# Patient Record
Sex: FEMALE | Race: BLACK OR AFRICAN AMERICAN | NOT HISPANIC OR LATINO | Employment: OTHER | URBAN - METROPOLITAN AREA
[De-identification: names, ages, dates, MRNs, and addresses within clinical notes are randomized per-mention and may not be internally consistent; named-entity substitution may affect disease eponyms.]

---

## 2017-01-11 ENCOUNTER — OFFICE VISIT (OUTPATIENT)
Dept: INTERNAL MEDICINE | Facility: CLINIC | Age: 67
End: 2017-01-11
Payer: MEDICARE

## 2017-01-11 ENCOUNTER — HOSPITAL ENCOUNTER (OUTPATIENT)
Dept: RADIOLOGY | Facility: CLINIC | Age: 67
Discharge: HOME | End: 2017-01-11
Attending: INTERNAL MEDICINE
Payer: MEDICARE

## 2017-01-11 VITALS
RESPIRATION RATE: 16 BRPM | WEIGHT: 168 LBS | HEIGHT: 62 IN | BODY MASS INDEX: 30.91 KG/M2 | DIASTOLIC BLOOD PRESSURE: 89 MMHG | SYSTOLIC BLOOD PRESSURE: 136 MMHG | HEART RATE: 68 BPM

## 2017-01-11 DIAGNOSIS — E28.39 ESTROGEN DEFICIENCY: ICD-10-CM

## 2017-01-11 DIAGNOSIS — F32.0 MAJOR DEPRESSIVE DISORDER, SINGLE EPISODE, MILD: ICD-10-CM

## 2017-01-11 DIAGNOSIS — E78.5 HYPERLIPIDEMIA, UNSPECIFIED HYPERLIPIDEMIA TYPE: ICD-10-CM

## 2017-01-11 DIAGNOSIS — M85.80 OSTEOPENIA: ICD-10-CM

## 2017-01-11 DIAGNOSIS — D50.9 IRON DEFICIENCY ANEMIA, UNSPECIFIED IRON DEFICIENCY ANEMIA TYPE: ICD-10-CM

## 2017-01-11 DIAGNOSIS — I10 ESSENTIAL HYPERTENSION: Primary | Chronic | ICD-10-CM

## 2017-01-11 DIAGNOSIS — I69.30 HISTORY OF CVA WITH RESIDUAL DEFICIT: ICD-10-CM

## 2017-01-11 DIAGNOSIS — I70.0 AORTIC ATHEROSCLEROSIS: ICD-10-CM

## 2017-01-11 DIAGNOSIS — Z85.3 PERSONAL HISTORY OF MALIGNANT NEOPLASM OF BREAST: ICD-10-CM

## 2017-01-11 DIAGNOSIS — R25.1 TREMOR: ICD-10-CM

## 2017-01-11 DIAGNOSIS — R16.0 LIVER MASS: ICD-10-CM

## 2017-01-11 PROCEDURE — 99999 PR PBB SHADOW E&M-EST. PATIENT-LVL III: CPT | Mod: PBBFAC,,, | Performed by: INTERNAL MEDICINE

## 2017-01-11 PROCEDURE — 77080 DXA BONE DENSITY AXIAL: CPT | Mod: TC

## 2017-01-11 PROCEDURE — 3079F DIAST BP 80-89 MM HG: CPT | Mod: S$GLB,,, | Performed by: INTERNAL MEDICINE

## 2017-01-11 PROCEDURE — 1159F MED LIST DOCD IN RCRD: CPT | Mod: S$GLB,,, | Performed by: INTERNAL MEDICINE

## 2017-01-11 PROCEDURE — 1157F ADVNC CARE PLAN IN RCRD: CPT | Mod: S$GLB,,, | Performed by: INTERNAL MEDICINE

## 2017-01-11 PROCEDURE — 3075F SYST BP GE 130 - 139MM HG: CPT | Mod: S$GLB,,, | Performed by: INTERNAL MEDICINE

## 2017-01-11 PROCEDURE — 99499 UNLISTED E&M SERVICE: CPT | Mod: S$GLB,,, | Performed by: INTERNAL MEDICINE

## 2017-01-11 PROCEDURE — 77080 DXA BONE DENSITY AXIAL: CPT | Mod: 26,,, | Performed by: INTERNAL MEDICINE

## 2017-01-11 PROCEDURE — 1160F RVW MEDS BY RX/DR IN RCRD: CPT | Mod: S$GLB,,, | Performed by: INTERNAL MEDICINE

## 2017-01-11 PROCEDURE — 99214 OFFICE O/P EST MOD 30 MIN: CPT | Mod: S$GLB,,, | Performed by: INTERNAL MEDICINE

## 2017-01-11 RX ORDER — TRAMADOL HYDROCHLORIDE 50 MG/1
50 TABLET ORAL 2 TIMES DAILY PRN
Qty: 60 TABLET | Refills: 3 | Status: SHIPPED | OUTPATIENT
Start: 2017-01-11 | End: 2017-10-16 | Stop reason: SDUPTHER

## 2017-01-11 NOTE — MR AVS SNAPSHOT
[facility]  [address]  [place] [address]  Phone: [phone]  Fax: [phone]                  Shelby Thompson   2017 8:00 AM   Office Visit    Description:  Female : 1950   Provider:  Emanuel Arevalo Jr., MD   Department:  [facility] - Internal Medicine           Diagnoses this Visit        Comments    Essential hypertension    -  Primary     Personal history of malignant neoplasm of breast         Iron deficiency anemia, unspecified iron deficiency anemia type         Hyperlipidemia, unspecified hyperlipidemia type         Tremor         Aortic atherosclerosis         History of CVA with residual deficit         Osteopenia         Liver mass         Major depressive disorder, single episode, mild         Estrogen deficiency                To Do List           Goals (5 Years of Data)     None       These Medications        Disp Refills Start End    tramadol (ULTRAM) 50 mg tablet 60 tablet 3 2017     Take 1 tablet (50 mg total) by mouth 2 (two) times daily as needed for Pain. - Oral    Pharmacy: [facility]/pharmacy #[id number] - [place], [place] - [address]  #: [phone]         [facility] On Call     [facility] On Call Nurse Care Line -  Assistance  Registered nurses in the [facility] On Call Center provide clinical advisement, health education, appointment booking, and other advisory services.  Call for this free service at [phone].             Medications           Message regarding Medications     Verify the changes and/or additions to your medication regime listed below are the same as discussed with your clinician today.  If any of these changes or additions are incorrect, please notify your healthcare provider.        STOP taking these medications     amantadine HCl (SYMMETREL) 100 mg capsule Take 1 capsule (100 mg total) by mouth 2 (two) times daily.           Verify that the below list of medications is an accurate representation of the  "medications you are currently taking.  If none reported, the list may be blank. If incorrect, please contact your healthcare provider. Carry this list with you in case of emergency.           Current Medications     amlodipine (NORVASC) 5 MG tablet Take 1 tablet (5 mg total) by mouth once daily.    aspirin 81 mg Tab Take 1 tablet by mouth Daily.    bisacodyl (DULCOLAX) 5 mg EC tablet Take 5 mg by mouth once daily.    ferrous gluconate (FERGON) 325 MG Tab Take 1 tablet (325 mg total) by mouth 2 (two) times daily with meals.    gabapentin (NEURONTIN) 300 MG capsule Take 1 capsule (300 mg total) by mouth 2 (two) times daily.    losartan-hydrochlorothiazide 100-25 mg (HYZAAR) 100-25 mg per tablet Take 1 tablet by mouth once daily.    omeprazole (PRILOSEC) 40 MG capsule Take 1 capsule (40 mg total) by mouth once daily.    sertraline (ZOLOFT) 50 MG tablet Take 1 tablet (50 mg total) by mouth once daily.    tramadol (ULTRAM) 50 mg tablet Take 1 tablet (50 mg total) by mouth 2 (two) times daily as needed for Pain.    vitamin D 1000 units Tab Take 185 mg by mouth once daily.    polyethylene glycol (COLYTE) 240-22.72-6.72 -5.84 gram SolR Take 8 oz q 15 minutes until complete (2,000 ml)  the night before procedure           Clinical Reference Information           Vital Signs - Last Recorded  Most recent update: 1/11/2017  8:14 AM by Piedad Cifuentes MA    BP Pulse Resp Ht Wt BMI    136/89 (BP Location: Left arm, Patient Position: Sitting) 68 16 5' 2" (1.575 m) 76.2 kg (167 lb 15.9 oz) 30.73 kg/m2      Blood Pressure          Most Recent Value    BP  136/89      Allergies as of 1/11/2017     Pravastatin      Immunizations Administered on Date of Encounter - 1/11/2017     None      Orders Placed During Today's Visit     Future Labs/Procedures Expected by Expires    CBC auto differential  1/11/2017 3/12/2018    Comprehensive metabolic panel  1/11/2017 3/12/2018    DXA Bone Density Spine And Hip_Axial Skeleton  1/11/2017 " 4/11/2017    Iron and TIBC  1/11/2017 4/11/2017    Lipid panel  1/11/2017 3/12/2018

## 2017-01-11 NOTE — PROGRESS NOTES
She is a 66-year-old female coming in today to follow up her ongoing medical   Problems.      1. She has a history of CVA affecting her right side in 2001. Has a mild   residual right-sided hemiparesis.       2. Hyperlipidemia.  She is intolerant to statins because of   rhabdo, liver enzymes have resolved. Her recent cholesterol unfortunately is   still elevated at 243 with HDL 37, . She is working on low-fat,   low-cholesterol diet.       3.  She has history of anemia, recent H and H has come up to   12.4/ 39.0   and iron studies show TIBC 474. Iron sat is 7 she is taking iron   replacement. She had a colonoscopy in 2014 for colon polyps, EGD in 2012, and video capsule in Sept 2016: Gastritis.                        - Multiple angioectasias without bleeding in the                         proximal small bowel.                        - A single angioectasia without bleeding in the                         ileum.  Gi recommended following up with H/H and continuing iron.      4. She has a tremor on her right   side that she was seen by Neurology back in 2012 for this . I reviewed those notes   and has been doing okay, but when she walks for a long time, she has to use a   Walker.  She does not think the amantidine is helping and we discuss stopping it.       5. She has a history of breast cancer, following up with regular   mammograms. Most recent mammogram was in September. She had lumpectomy in 2011.      6. She had a liver mass that we recently got MRI followup, which showed a   benign appearing 1 cm lesion in the VI area of the liver, which seems to be   Hemangioma.     7.  She continues to have some pain in her right arm secondary to her   stroke post-residual from a stroke. She is taking tramadol for that is working.      8. She has a history of some depression and anxiety for which she is on Zoloft.   She says this has been doing well too. Otherwise, she denies any problems.     PHYSICAL  EXAMINATION:  GENERAL: She is a well-appearing -American female in no acute distress.    is here with this visit, blood pressure and pulse reviewed, weight is   reviewed.  NECK: Supple. She has no JVD. Thyroid is not enlarged.  CARDIOVASCULAR: S1 and S2, regular rate and rhythm without murmur, gallop or   rub.  ABDOMEN: Soft, nontender, no hepatosplenomegaly, no guarding or rebound   tenderness.  LOWER EXTREMITIES: No edema. She has a tremor of her right arm, less in her   right leg. She has normal muscle strength in both, but her dexterity in the   right arm is less.     ASSESSMENT: Hypertension, iron deficiency anemia, hyperlipidemia, cannot   tolerate statins due to rhabdo, history of cerebral infarction, tremor now,   history of elevated liver enzymes that resolved, history of CVA and the liver   mass, which we reviewed already. Continue current medications- except will stop the amantadine and if tremor worsens we can restart it.   I will follow   up again in six months. Continue iron. If she has any problems before next   visit, she will let me know.     Will get BMD

## 2017-01-25 DIAGNOSIS — F32.A DEPRESSION: ICD-10-CM

## 2017-01-26 RX ORDER — SERTRALINE HYDROCHLORIDE 50 MG/1
TABLET, FILM COATED ORAL
Qty: 90 TABLET | Refills: 9 | Status: SHIPPED | OUTPATIENT
Start: 2017-01-26 | End: 2018-04-19 | Stop reason: SDUPTHER

## 2017-02-13 ENCOUNTER — OFFICE VISIT (OUTPATIENT)
Dept: OPTOMETRY | Facility: CLINIC | Age: 67
End: 2017-02-13
Payer: MEDICARE

## 2017-02-13 ENCOUNTER — OFFICE VISIT (OUTPATIENT)
Dept: INTERNAL MEDICINE | Facility: CLINIC | Age: 67
End: 2017-02-13
Payer: MEDICARE

## 2017-02-13 VITALS
BODY MASS INDEX: 31.48 KG/M2 | HEIGHT: 62 IN | DIASTOLIC BLOOD PRESSURE: 80 MMHG | SYSTOLIC BLOOD PRESSURE: 120 MMHG | WEIGHT: 171.06 LBS | HEART RATE: 72 BPM

## 2017-02-13 DIAGNOSIS — I10 ESSENTIAL HYPERTENSION: ICD-10-CM

## 2017-02-13 DIAGNOSIS — I71.20 THORACIC AORTIC ANEURYSM WITHOUT RUPTURE: ICD-10-CM

## 2017-02-13 DIAGNOSIS — Z85.3 PERSONAL HISTORY OF MALIGNANT NEOPLASM OF BREAST: ICD-10-CM

## 2017-02-13 DIAGNOSIS — R25.1 TREMOR: ICD-10-CM

## 2017-02-13 DIAGNOSIS — H52.03 HYPEROPIA WITH PRESBYOPIA OF BOTH EYES: ICD-10-CM

## 2017-02-13 DIAGNOSIS — Z00.00 ENCOUNTER FOR PREVENTIVE HEALTH EXAMINATION: ICD-10-CM

## 2017-02-13 DIAGNOSIS — M85.80 OSTEOPENIA: ICD-10-CM

## 2017-02-13 DIAGNOSIS — I69.359 HEMIPARESIS AFFECTING DOMINANT SIDE AS LATE EFFECT OF STROKE: ICD-10-CM

## 2017-02-13 DIAGNOSIS — I70.0 AORTIC ATHEROSCLEROSIS: ICD-10-CM

## 2017-02-13 DIAGNOSIS — H40.013 OPEN ANGLE WITH BORDERLINE FINDINGS AND LOW GLAUCOMA RISK IN BOTH EYES: ICD-10-CM

## 2017-02-13 DIAGNOSIS — G56.90: ICD-10-CM

## 2017-02-13 DIAGNOSIS — H25.13 NUCLEAR SCLEROSIS, BILATERAL: Primary | ICD-10-CM

## 2017-02-13 DIAGNOSIS — Z13.5 GLAUCOMA SCREENING: ICD-10-CM

## 2017-02-13 DIAGNOSIS — E78.5 HYPERLIPIDEMIA, UNSPECIFIED HYPERLIPIDEMIA TYPE: ICD-10-CM

## 2017-02-13 DIAGNOSIS — F33.42 RECURRENT MAJOR DEPRESSIVE DISORDER, IN FULL REMISSION: ICD-10-CM

## 2017-02-13 DIAGNOSIS — I51.89 LEFT VENTRICULAR DIASTOLIC DYSFUNCTION WITH PRESERVED SYSTOLIC FUNCTION: ICD-10-CM

## 2017-02-13 DIAGNOSIS — I73.9 PAD (PERIPHERAL ARTERY DISEASE): ICD-10-CM

## 2017-02-13 DIAGNOSIS — H52.4 HYPEROPIA WITH PRESBYOPIA OF BOTH EYES: ICD-10-CM

## 2017-02-13 DIAGNOSIS — I69.30 HISTORY OF CVA WITH RESIDUAL DEFICIT: ICD-10-CM

## 2017-02-13 DIAGNOSIS — D50.9 IRON DEFICIENCY ANEMIA, UNSPECIFIED IRON DEFICIENCY ANEMIA TYPE: ICD-10-CM

## 2017-02-13 PROCEDURE — 92133 CPTRZD OPH DX IMG PST SGM ON: CPT | Mod: S$GLB,,, | Performed by: OPTOMETRIST

## 2017-02-13 PROCEDURE — 3074F SYST BP LT 130 MM HG: CPT | Mod: S$GLB,,, | Performed by: NURSE PRACTITIONER

## 2017-02-13 PROCEDURE — 92015 DETERMINE REFRACTIVE STATE: CPT | Mod: S$GLB,,, | Performed by: OPTOMETRIST

## 2017-02-13 PROCEDURE — 92014 COMPRE OPH EXAM EST PT 1/>: CPT | Mod: S$GLB,,, | Performed by: OPTOMETRIST

## 2017-02-13 PROCEDURE — G0439 PPPS, SUBSEQ VISIT: HCPCS | Mod: S$GLB,,, | Performed by: NURSE PRACTITIONER

## 2017-02-13 PROCEDURE — 99999 PR PBB SHADOW E&M-EST. PATIENT-LVL III: CPT | Mod: PBBFAC,,, | Performed by: OPTOMETRIST

## 2017-02-13 PROCEDURE — 3078F DIAST BP <80 MM HG: CPT | Mod: S$GLB,,, | Performed by: OPTOMETRIST

## 2017-02-13 PROCEDURE — 3074F SYST BP LT 130 MM HG: CPT | Mod: S$GLB,,, | Performed by: OPTOMETRIST

## 2017-02-13 PROCEDURE — 99999 PR PBB SHADOW E&M-EST. PATIENT-LVL IV: CPT | Mod: PBBFAC,,, | Performed by: NURSE PRACTITIONER

## 2017-02-13 PROCEDURE — 99499 UNLISTED E&M SERVICE: CPT | Mod: S$GLB,,, | Performed by: NURSE PRACTITIONER

## 2017-02-13 PROCEDURE — 3079F DIAST BP 80-89 MM HG: CPT | Mod: S$GLB,,, | Performed by: NURSE PRACTITIONER

## 2017-02-13 PROCEDURE — 99499 UNLISTED E&M SERVICE: CPT | Mod: S$GLB,,, | Performed by: OPTOMETRIST

## 2017-02-13 RX ORDER — FERROUS GLUCONATE 324(37.5)
TABLET ORAL
COMMUNITY
Start: 2017-01-19 | End: 2017-03-14 | Stop reason: SDUPTHER

## 2017-02-13 NOTE — PROGRESS NOTES
"Shelby Thompson presented for a  Medicare AWV and comprehensive Health Risk Assessment today. The following components were reviewed and updated:    · Medical history  · Family History  · Social history  · Allergies and Current Medications  · Health Risk Assessment  · Health Maintenance  · Care Team     ** See Completed Assessments for Annual Wellness Visit within the encounter summary.**       The following assessments were completed:  · Living Situation  · CAGE  · Depression Screening  · Timed Get Up and Go  · Whisper Test  · Cognitive Function Screening  · Nutrition Screening  · ADL Screening  · PAQ Screening    Vitals:    02/13/17 0809 02/13/17 0827   BP:  120/80   BP Location:  Left arm   Pulse:  72   Weight: 77.6 kg (171 lb 1.2 oz)    Height: 5' 2" (1.575 m)      Body mass index is 31.29 kg/(m^2).  Physical Exam   Constitutional: She is oriented to person, place, and time. She appears well-developed and well-nourished.   HENT:   Head: Normocephalic.   Cardiovascular: Normal rate, regular rhythm and normal heart sounds.    No murmur heard.  Pulmonary/Chest: Effort normal and breath sounds normal. She has no wheezes. She has no rales.   Abdominal: Soft. Bowel sounds are normal.   Musculoskeletal: Normal range of motion. She exhibits no edema.   Neurological: She is alert and oriented to person, place, and time. She exhibits normal muscle tone.   Mild dysarhtira, Mild right hemiparesis, tremor   Skin: Skin is warm and dry.   Psychiatric: She has a normal mood and affect.   Nursing note and vitals reviewed.        Diagnoses and health risks identified today and associated recommendations/orders:    1. Encounter for preventive health examination  Here for Health Risk Assessment. Follow up in one year.  Unable to complete Mini Cog/Clock drawing secondary to inability to write.    2. Glaucoma screening  - Ambulatory Referral to Optometry    3. Essential hypertension  Chronic, stable on current medications. Followed " by PCP.    4. Aortic atherosclerosis  Chronic, stable on current medications. Noted on  CT of chest 4/23/15. Followed by PCP.    5. Left ventricular diastolic dysfunction with preserved systolic function  Chronic, stable on current medications. Noted on ECHO 3/25/25. Followed by PCP.    6. Thoracic aortic aneurysm without rupture  Chronic, stable. Followed by Vascular Surgery    7. History of CVA with residual deficit  Chronic, stable on current medications. Followed by PCP.    8. Hemiparesis affecting dominant side as late effect of stroke  Chronic, stable on current medications. Followed by PCP.    9. 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 Followed by PCP.    10. PAD (peripheral artery disease)  Chronic, stable on current medications. Noted on CAILIN 5/07/15. Followed by PCP.    11. Mononeuritis of upper limb, unspecified  Chronic, stable on current medications. Followed by PCP.    12. Iron deficiency anemia, unspecified iron deficiency anemia type  Chronic, stable on current medications. Followed by PCP.    13. Hyperlipidemia, unspecified hyperlipidemia type  Chronic, stable wit diet, unable to tolerate statins. Followed by PCP.    14. Personal history of malignant neoplasm of breast  Stable. Followed by Oncology[facility]    15. Tremor  Chronic, stable.  Followed by PCP.    16. Osteopenia  Chronic, stable. Followed by PCP.    17. Recurrent major depressive disorder, in full remission  Chronic, stable on current medication. PHQ-9 score 0. Followed by PCP.    18. 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  Chronic. Followed by Optometry.      Provided Shelby with a 5-10 year written screening schedule and personal prevention plan. Recommendations were developed using the USPSTF age appropriate recommendations. Education, counseling, and referrals were provided as needed. After Visit Summary printed and given to patient which includes a list of  additional screenings\tests needed.    Return in about 11 months (around 1/11/2018).with PCP.    Kat Antonio NP

## 2017-02-13 NOTE — PROGRESS NOTES
HPI     Last eye exam was 8/4/14 with Dr. Viera.  Patient states decrease in overall vision since last exam-thinks cataracts   have gotten worse. Also overdue for health check for glaucoma per PCP. Has   glasses but only wears them for reading.  Patient denies diplopia, headaches, flashes/floaters, and pain.         Last edited by Katherine Guo on 2/13/2017  1:10 PM.     ROS     Negative for: Constitutional, Gastrointestinal, Neurological, Skin,   Genitourinary, Musculoskeletal, HENT, Endocrine, Cardiovascular, Eyes,   Respiratory, Psychiatric, Allergic/Imm, Heme/Lymph    Last edited by Mariel Simental, OD on 2/13/2017  1:41 PM. (History)        Assessment /Plan     For exam results, see Encounter Report.    Nuclear sclerosis, bilateral    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  -     OCT - Optic Nerve    Essential hypertension    Hyperopia with presbyopia of both eyes            1.  Educated on cataracts and affects on vision.  Monitor.  2.  Due to increased c/d ratio.  OCT and eye pressure normal OU.  No family history of glaucoma.  Most likely physiological.  Monitor yearly.  3.  No retinopathy--monitor yearly.  BP control.  4.  Bifocal rx given            RTC 1 year for routine exam.

## 2017-02-13 NOTE — PATIENT INSTRUCTIONS
Counseling and Referral of Other Preventative  (Italic type indicates deductible and co-insurance are waived)    Patient Name: Shelby Thompson  Today's Date: 2/13/2017      SERVICE LIMITATIONS RECOMMENDATION    Vaccines    · Pneumococcal (once after 65)    · Influenza (annually)    · Hepatitis B (if medium/high risk)    · Prevnar 13      Hepatitis B medium/high risk factors:       - End-stage renal disease       - Hemophiliacs who received Factor VII or         IX concentrates       - Clients of institutions for the mentally             retarded       - Persons who live in the same house as          a HepB carrier       - Homosexual men       - Illicit injectable drug abusers     Pneumococcal: Done, no repeat necessary     Influenza: Done, repeat in one year     Hepatitis B: N/A Defer to PCP     Prevnar 13: N/A Done - no repeat necessary    Mammogram (biennial age 50-74)  Annually (age 40 or over)  Last done 9/21/16, recommend to repeat every 1  years    Pap (up to age 70 and after 70 if unknown history or abnormal study last 10 years)    N/A Not indicated     The USPSTF recommends against screening for cervical cancer in women older than age 65 years who have had adequate prior screening and are not otherwise at high risk for cervical cancer.      Colorectal cancer screening (to age 75)    · Fecal occult blood test (annual)  · Flexible sigmoidoscopy (5y)  · Screening colonoscopy (10y)  · Barium enema   Last done 3/26/15, recommend to repeat every 10  years    Diabetes self-management training (no USPSTF recommendations)  Requires referral by treating physician for patient with diabetes or renal disease. 10 hours of initial DSMT sessions of no less than 30 minutes each in a continuous 12-month period. 2 hours of follow-up DSMT in subsequent years.  N/A Not indicated    Bone mass measurements (age 65 & older, biennial)  Requires diagnosis related to osteoporosis or estrogen deficiency. Biennial benefit unless  patient has history of long-term glucocorticoid  Last done 1/11/17, recommend to repeat every 2  years    Glaucoma screening (no USPSTF recommendation)  Diabetes mellitus, family history   , age 50 or over    American, age 65 or over  Scheduled, see appointments    Medical nutrition therapy for diabetes or renal disease (no recommended schedule)  Requires referral by treating physician for patient with diabetes or renal disease or kidney transplant within the past 3 years.  Can be provided in same year as diabetes self-management training (DSMT), and CMS recommends medical nutrition therapy take place after DSMT. Up to 3 hours for initial year and 2 hours in subsequent years.  N/A Not indicated    Cardiovascular screening blood tests (every 5 years)  · Fasting lipid panel  Order as a panel if possible  Last done 4/07/16, recommend to repeat every 5  years    Diabetes screening tests (at least every 3 years, Medicare covers annually or at 6-month intervals for prediabetic patients)  · Fasting blood sugar (FBS) or glucose tolerance test (GTT)  Patient must be diagnosed with one of the following:       - Hypertension       - Dyslipidemia       - Obesity (BMI 30kg/m2)       - Previous elevated impaired FBS or GTT       ... or any two of the following:       - Overweight (BMI 25 but <30)       - Family history of diabetes       - Age 65 or older       - History of gestational diabetes or birth of baby weighing more than 9 pounds  Last done 5/18/16, recommend to repeat every 3  years    Abdominal aortic aneurysm screening (once)  · Sonogram   Limited to patients who meet one of the following criteria:       - Men who are 65-75 years old and have smoked more than 100 cigarette in their lifetime       - Anyone with a family history of abdominal aortic aneurysm       - Anyone recommended for screening by the USPSTF  N/A Not indicated    HIV screening (annually for increased risk patients)  · HIV-1 and  HIV-2 by EIA, or VERONICA, rapid antibody test or oral mucosa transudate  Patients must be at increased risk for HIV infection per USPSTF guidelines or pregnant. Tests covered annually for patient at increased risk or as requested by the patient. Pregnant patients may receive up to 3 tests during pregnancy.  Risks discussed, screening is not recommended    Smoking cessation counseling (up to 8 sessions per year)  Patients must be asymptomatic of tobacco-related conditions to receive as a preventative service.  Not indicated    Subsequent annual wellness visit  At least 12 months since last AWV  Return in one year     The following information is provided to all patients.  This information is to help you find resources for any of the problems found today that may be affecting your health:                Living healthy guide: www.[facility].[place].[facility]      Understanding Diabetes: www.diabetes.org      Eating healthy: www.cdc.gov/healthyweight      CDC home safety checklist: www.cdc.gov/steadi/patient.html      Agency on Aging: www.goea.[place].[facility]      Alcoholics anonymous (AA): www.aa.org      Physical Activity: www.joseline.nih.gov/zi5gqdx      Tobacco use: www.quitwithusla.org

## 2017-02-13 NOTE — MR AVS SNAPSHOT
[facility] - Internal Medicine  [address]  [facility] [address]  Phone: [phone]  Fax: [phone]                  Shelby Thompson   2017 8:00 AM   Office Visit    Description:  Female : 1950   Provider:  Kat Antonio NP   Department:  [facility] - Internal Medicine           Diagnoses this Visit        Comments    Encounter for preventive health examination         Glaucoma screening         Essential hypertension         Aortic atherosclerosis         Left ventricular diastolic dysfunction with preserved systolic function         Thoracic aortic aneurysm without rupture         History of CVA with residual deficit         Hemiparesis affecting dominant side as late effect of stroke         Dysarthria due to cerebrovascular accident         PAD (peripheral artery disease)         Mononeuritis of upper limb, unspecified         Iron deficiency anemia, unspecified iron deficiency anemia type         Hyperlipidemia, unspecified hyperlipidemia type         Personal history of malignant neoplasm of breast         Tremor         Osteopenia         Recurrent major depressive disorder, in full remission         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                To Do List           Goals (5 Years of Data)     None      Follow-Up and Disposition     Return in about 11 months (around 2018).    Follow-up and Disposition History      [facility] On Call     [facility] On Call Nurse Care Line -  Assistance  Registered nurses in the [facility] On Call Center provide clinical advisement, health education, appointment booking, and other advisory services.  Call for this free service at [phone].             Medications           Message regarding Medications     Verify the changes and/or additions to your medication regime listed below are the same as discussed with your clinician today.  If any of these changes or additions are incorrect, please  "notify your healthcare provider.        STOP taking these medications     polyethylene glycol (COLYTE) 240-22.72-6.72 -5.84 gram SolR Take 8 oz q 15 minutes until complete (2,000 ml)  the night before procedure           Verify that the below list of medications is an accurate representation of the medications you are currently taking.  If none reported, the list may be blank. If incorrect, please contact your healthcare provider. Carry this list with you in case of emergency.           Current Medications     amlodipine (NORVASC) 5 MG tablet Take 1 tablet (5 mg total) by mouth once daily.    aspirin 81 mg Tab Take 1 tablet by mouth Daily.    bisacodyl (DULCOLAX) 5 mg EC tablet Take 5 mg by mouth once daily.    ferrous gluconate (FERGON) 325 MG Tab Take 1 tablet (325 mg total) by mouth 2 (two) times daily with meals.    gabapentin (NEURONTIN) 300 MG capsule Take 1 capsule (300 mg total) by mouth 2 (two) times daily.    losartan-hydrochlorothiazide 100-25 mg (HYZAAR) 100-25 mg per tablet Take 1 tablet by mouth once daily.    omeprazole (PRILOSEC) 40 MG capsule Take 1 capsule (40 mg total) by mouth once daily.    sertraline (ZOLOFT) 50 MG tablet TAKE 1 TABLET (50 MG TOTAL) BY MOUTH ONCE DAILY.    tramadol (ULTRAM) 50 mg tablet Take 1 tablet (50 mg total) by mouth 2 (two) times daily as needed for Pain.    vitamin D 1000 units Tab Take 185 mg by mouth once daily.    ferrous gluconate 324 mg (37.5 mg iron) Tab            Clinical Reference Information           Your Vitals Were     BP Pulse Height Weight BMI    120/80 (BP Location: Left arm) 72 5' 2" (1.575 m) 77.6 kg (171 lb 1.2 oz) 31.29 kg/m2      Blood Pressure          Most Recent Value    BP  120/80      Allergies as of 2/13/2017     Pravastatin      Immunizations Administered on Date of Encounter - 2/13/2017     None      Orders Placed During Today's Visit      Normal Orders This Visit    Ambulatory Referral to Optometry       Instructions      Counseling and " Referral of Other Preventative  (Italic type indicates deductible and co-insurance are waived)    Patient Name: Shelby Thompson  Today's Date: 2/13/2017      SERVICE LIMITATIONS RECOMMENDATION    Vaccines    · Pneumococcal (once after 65)    · Influenza (annually)    · Hepatitis B (if medium/high risk)    · Prevnar 13      Hepatitis B medium/high risk factors:       - End-stage renal disease       - Hemophiliacs who received Factor VII or         IX concentrates       - Clients of institutions for the mentally             retarded       - Persons who live in the same house as          a HepB carrier       - Homosexual men       - Illicit injectable drug abusers     Pneumococcal: Done, no repeat necessary     Influenza: Done, repeat in one year     Hepatitis B: N/A Defer to PCP     Prevnar 13: N/A Done - no repeat necessary    Mammogram (biennial age 50-74)  Annually (age 40 or over)  Last done 9/21/16, recommend to repeat every 1  years    Pap (up to age 70 and after 70 if unknown history or abnormal study last 10 years)    N/A Not indicated     The USPSTF recommends against screening for cervical cancer in women older than age 65 years who have had adequate prior screening and are not otherwise at high risk for cervical cancer.      Colorectal cancer screening (to age 75)    · Fecal occult blood test (annual)  · Flexible sigmoidoscopy (5y)  · Screening colonoscopy (10y)  · Barium enema   Last done 3/26/15, recommend to repeat every 10  years    Diabetes self-management training (no USPSTF recommendations)  Requires referral by treating physician for patient with diabetes or renal disease. 10 hours of initial DSMT sessions of no less than 30 minutes each in a continuous 12-month period. 2 hours of follow-up DSMT in subsequent years.  N/A Not indicated    Bone mass measurements (age 65 & older, biennial)  Requires diagnosis related to osteoporosis or estrogen deficiency. Biennial benefit unless patient has history of  long-term glucocorticoid  Last done 1/11/17, recommend to repeat every 2  years    Glaucoma screening (no USPSTF recommendation)  Diabetes mellitus, family history   , age 50 or over    American, age 65 or over  Scheduled, see appointments    Medical nutrition therapy for diabetes or renal disease (no recommended schedule)  Requires referral by treating physician for patient with diabetes or renal disease or kidney transplant within the past 3 years.  Can be provided in same year as diabetes self-management training (DSMT), and CMS recommends medical nutrition therapy take place after DSMT. Up to 3 hours for initial year and 2 hours in subsequent years.  N/A Not indicated    Cardiovascular screening blood tests (every 5 years)  · Fasting lipid panel  Order as a panel if possible  Last done 4/07/16, recommend to repeat every 5  years    Diabetes screening tests (at least every 3 years, Medicare covers annually or at 6-month intervals for prediabetic patients)  · Fasting blood sugar (FBS) or glucose tolerance test (GTT)  Patient must be diagnosed with one of the following:       - Hypertension       - Dyslipidemia       - Obesity (BMI 30kg/m2)       - Previous elevated impaired FBS or GTT       ... or any two of the following:       - Overweight (BMI 25 but <30)       - Family history of diabetes       - Age 65 or older       - History of gestational diabetes or birth of baby weighing more than 9 pounds  Last done 5/18/16, recommend to repeat every 3  years    Abdominal aortic aneurysm screening (once)  · Sonogram   Limited to patients who meet one of the following criteria:       - Men who are 65-75 years old and have smoked more than 100 cigarette in their lifetime       - Anyone with a family history of abdominal aortic aneurysm       - Anyone recommended for screening by the USPSTF  N/A Not indicated    HIV screening (annually for increased risk patients)  · HIV-1 and HIV-2 by EIA, or  VERONICA, rapid antibody test or oral mucosa transudate  Patients must be at increased risk for HIV infection per USPSTF guidelines or pregnant. Tests covered annually for patient at increased risk or as requested by the patient. Pregnant patients may receive up to 3 tests during pregnancy.  Risks discussed, screening is not recommended    Smoking cessation counseling (up to 8 sessions per year)  Patients must be asymptomatic of tobacco-related conditions to receive as a preventative service.  Not indicated    Subsequent annual wellness visit  At least 12 months since last AWV  Return in one year     The following information is provided to all patients.  This information is to help you find resources for any of the problems found today that may be affecting your health:                Living healthy guide: www.[facility].[place].[facility]      Understanding Diabetes: www.diabetes.org      Eating healthy: www.cdc.gov/healthyweight      [facility] home safety checklist: www.cdc.gov/steadi/patient.html      Agency on Aging: www.goea.[place].[facility]      Alcoholics anonymous (AA): www.aa.org      Physical Activity: www.joseline.nih.gov/ut9rnpj      Tobacco use: www.quitwithusla.org          Language Assistance Services     ATTENTION: Language assistance services are available, free of charge. Please call [phone].      ATENCIÓN: Si habla español, tiene a guerin disposición servicios gratuitos de asistencia lingüística. Llame al [phone].     ENOCH Ý: N?u b?n nói Ti?ng Vi?t, có các d?ch v? h? tr? ngôn ng? mi?n phí dành cho b?n. G?i s? [phone].         [facility] complies with applicable Federal civil rights laws and does not discriminate on the basis of race, color, national origin, age, disability, or sex.

## 2017-02-13 NOTE — LETTER
February 13, 2017      Kat Antonio, NP  [address]  [facility] [address]           [facility]  [address]  [facility] [address]  Phone: [phone]  Fax: [phone]          Patient: Shelby Thompson   MR Number: [id number]   YOB: 1950   Date of Visit: 2/13/2017       Dear Kta Antonio:    Thank you for referring Shelby Thompson to me for evaluation. Attached you will find relevant portions of my assessment and plan of care.    If you have questions, please do not hesitate to call me. I look forward to following Shelby Thompson along with you.    Sincerely,    Mariel Simental, OD    Enclosure  CC:  No Recipients    If you would like to receive this communication electronically, please contact [email or web address] or [phone] to request more information on [employer] Link access.    For providers and/or their staff who would like to refer a patient to [facility], please contact us through our one-stop-shop provider referral line, [facility], [place], at [phone].    If you feel you have received this communication in error or would no longer like to receive these types of communications, please e-mail [email or web address]

## 2017-03-15 RX ORDER — FERROUS GLUCONATE 324(37.5)
324 TABLET ORAL 2 TIMES DAILY WITH MEALS
Qty: 60 TABLET | Refills: 3 | COMMUNITY
Start: 2017-03-15 | End: 2017-09-15

## 2017-07-17 ENCOUNTER — OFFICE VISIT (OUTPATIENT)
Dept: INTERNAL MEDICINE | Facility: CLINIC | Age: 67
End: 2017-07-17
Payer: MEDICARE

## 2017-07-17 VITALS
HEIGHT: 63 IN | SYSTOLIC BLOOD PRESSURE: 112 MMHG | DIASTOLIC BLOOD PRESSURE: 81 MMHG | HEART RATE: 62 BPM | WEIGHT: 168.44 LBS | BODY MASS INDEX: 29.84 KG/M2 | OXYGEN SATURATION: 96 %

## 2017-07-17 DIAGNOSIS — I10 ESSENTIAL HYPERTENSION: ICD-10-CM

## 2017-07-17 DIAGNOSIS — I73.9 PAD (PERIPHERAL ARTERY DISEASE): ICD-10-CM

## 2017-07-17 DIAGNOSIS — Z85.3 PERSONAL HISTORY OF MALIGNANT NEOPLASM OF BREAST: ICD-10-CM

## 2017-07-17 DIAGNOSIS — F33.42 RECURRENT MAJOR DEPRESSIVE DISORDER, IN FULL REMISSION: ICD-10-CM

## 2017-07-17 DIAGNOSIS — I69.30 HISTORY OF CVA WITH RESIDUAL DEFICIT: ICD-10-CM

## 2017-07-17 DIAGNOSIS — R25.1 TREMOR: Primary | ICD-10-CM

## 2017-07-17 DIAGNOSIS — E78.5 HYPERLIPIDEMIA, UNSPECIFIED HYPERLIPIDEMIA TYPE: ICD-10-CM

## 2017-07-17 DIAGNOSIS — R16.0 LIVER MASS: ICD-10-CM

## 2017-07-17 DIAGNOSIS — D50.9 IRON DEFICIENCY ANEMIA, UNSPECIFIED IRON DEFICIENCY ANEMIA TYPE: ICD-10-CM

## 2017-07-17 DIAGNOSIS — Z78.9 STATIN INTOLERANCE: ICD-10-CM

## 2017-07-17 PROCEDURE — 1126F AMNT PAIN NOTED NONE PRSNT: CPT | Mod: S$GLB,,, | Performed by: INTERNAL MEDICINE

## 2017-07-17 PROCEDURE — 99499 UNLISTED E&M SERVICE: CPT | Mod: S$GLB,,, | Performed by: INTERNAL MEDICINE

## 2017-07-17 PROCEDURE — 99999 PR PBB SHADOW E&M-EST. PATIENT-LVL IV: CPT | Mod: PBBFAC,,, | Performed by: INTERNAL MEDICINE

## 2017-07-17 PROCEDURE — 99214 OFFICE O/P EST MOD 30 MIN: CPT | Mod: S$GLB,,, | Performed by: INTERNAL MEDICINE

## 2017-07-17 PROCEDURE — 1159F MED LIST DOCD IN RCRD: CPT | Mod: S$GLB,,, | Performed by: INTERNAL MEDICINE

## 2017-07-17 NOTE — PROGRESS NOTES
She is a 67-year-old female coming in today to follow up her ongoing medical   Problems.        1. She has a history of CVA affecting her right side in 2001. Has a mild   residual right-sided hemiparesis.         2. Hyperlipidemia.  She is intolerant to statins because of   rhabdo, liver enzymes have resolved. Her most recent cholesterol unfortunately is   still elevated at 243 with HDL 37, . She is working on low-fat,   low-cholesterol diet.         3.  She has history of anemia, recent H and H has come up to   12.4/ 39.0 (9/2016)  and iron studies show TIBC 474. Iron sat is 7 she is taking iron   replacement. She had a colonoscopy in 2014 for colon polyps, EGD in 2012, and video capsule in Sept 2016: Gastritis.                        - Multiple angioectasias without bleeding in the                         proximal small bowel.                        - A single angioectasia without bleeding in the                         ileum.  Gi recommended following up with H/H and continuing iron.  she does have some fatigue .  NO SOB      4. She has a tremor on her right   side that she was seen by Neurology back in 2012 for this . I reviewed those notes   and has been doing okay, but when she walks for a long time, she has to use a   Walker.  She does not think the amantidine  So it was stopped last time-- she has not noticed a difference .         5. She has a history of breast cancer, following up with regular   mammograms. Most recent mammogram was in September. She had lumpectomy in 2011.        6. She had a liver mass that we recently got MRI follow up 5/16, which showed a   benign appearing 1 cm lesion in the VI area of the liver, which seems to be   Hemangioma.      7.  She continues to have some pain in her right arm secondary to her   stroke post-residual from a stroke. She is taking tramadol for that is working.        8. She has a history of some depression and anxiety for which she is on Zoloft.   She says  "this has been doing well too. Otherwise, she denies any problems.    Htn: she is taking amlodipine and hyzaar-- bp is well controled today.      ROS : Gen - no fatigue or significant weight change  Eyes - no eye pain or visual changes  ENT - no hoarseness or sore throat  CV - No chest pain or SOB.  NO palpitations.  Pulm - no cough or wheezing  GI - no N/V/D   no dysuria or incontinence  MS - no joint pain or muscle pain  Skin - no rash, or c/o of skin lesions  Neuro - no HA, dizziness--- memory is doing well.   Heme - no abnormal bleeding or bruising  Endo - no polydipsia, or temperature changes  Psych - as above       PHYSICAL EXAMINATION: /81   Pulse 62   Ht 5' 3" (1.6 m)   Wt 76.4 kg (168 lb 6.9 oz)   SpO2 96%   BMI 29.84 kg/m²     GENERAL: She is a well-appearing -American female in no acute distress.    is here with this visit, blood pressure and pulse reviewed, weight is   reviewed.  NECK: Supple. She has no JVD. Thyroid is not enlarged.  CARDIOVASCULAR: S1 and S2, regular rate and rhythm without murmur, gallop or   Rub.  CHEST:  Clear bilaterally.    ABDOMEN: Soft, nontender, no hepatosplenomegaly, no guarding or rebound   tenderness.  LOWER EXTREMITIES: No edema. She has a tremor of her right arm, less in her   right leg. She has normal muscle strength in both, but her dexterity in the   right arm is less.     ASSESSMENT: Hypertension, iron deficiency anemia, hyperlipidemia, cannot   tolerate statins due to rhabdo, history of cerebral infarction, tremor now,   history of elevated liver enzymes that resolved, history of CVA and the liver   mass, which we reviewed already. Continue current medications-  Will check lipids and refer to cards to see if she is a canidate for one of the novel lipid lowering medications.    up again in six months. Continue iron. Will recheck CBC.  If she has any problems before next   visit, she will let me know.  MMG due in Sept.       "

## 2017-07-18 ENCOUNTER — OFFICE VISIT (OUTPATIENT)
Dept: CARDIOLOGY | Facility: CLINIC | Age: 67
End: 2017-07-18
Payer: MEDICARE

## 2017-07-18 VITALS
HEART RATE: 59 BPM | HEIGHT: 63 IN | WEIGHT: 166.88 LBS | SYSTOLIC BLOOD PRESSURE: 135 MMHG | DIASTOLIC BLOOD PRESSURE: 81 MMHG | BODY MASS INDEX: 29.57 KG/M2

## 2017-07-18 DIAGNOSIS — E78.5 HYPERLIPIDEMIA, UNSPECIFIED HYPERLIPIDEMIA TYPE: ICD-10-CM

## 2017-07-18 DIAGNOSIS — I10 ESSENTIAL HYPERTENSION: ICD-10-CM

## 2017-07-18 PROCEDURE — 1126F AMNT PAIN NOTED NONE PRSNT: CPT | Mod: S$GLB,,, | Performed by: INTERNAL MEDICINE

## 2017-07-18 PROCEDURE — 99214 OFFICE O/P EST MOD 30 MIN: CPT | Mod: S$GLB,,, | Performed by: INTERNAL MEDICINE

## 2017-07-18 PROCEDURE — 1159F MED LIST DOCD IN RCRD: CPT | Mod: S$GLB,,, | Performed by: INTERNAL MEDICINE

## 2017-07-18 PROCEDURE — 99499 UNLISTED E&M SERVICE: CPT | Mod: S$GLB,,, | Performed by: INTERNAL MEDICINE

## 2017-07-18 PROCEDURE — 99999 PR PBB SHADOW E&M-EST. PATIENT-LVL III: CPT | Mod: PBBFAC,,, | Performed by: INTERNAL MEDICINE

## 2017-07-18 NOTE — LETTER
July 18, 2017      Emanuel Arevalo Jr., MD  [address]  [place] [address]           [facility] - Cardiology  [address]  [place] [address]  Phone: [phone]          Patient: Shelby Thompson   MR Number: [id number]   YOB: 1950   Date of Visit: 7/18/2017       Dear Dr. Emanuel Arevalo Jr.:    Thank you for referring Shelby Thompson to me for evaluation. Attached you will find relevant portions of my assessment and plan of care.    If you have questions, please do not hesitate to call me. I look forward to following Shelby Thompson along with you.    Sincerely,    Anmol Gan MD    Enclosure  CC:  No Recipients    If you would like to receive this communication electronically, please contact [email or web address] or [phone] to request more information on [employer] Link access.    For providers and/or their staff who would like to refer a patient to [facility], please contact us through our one-stop-shop provider referral line, [facility], at [phone].    If you feel you have received this communication in error or would no longer like to receive these types of communications, please e-mail [email or web address]

## 2017-07-18 NOTE — ASSESSMENT & PLAN NOTE
Yesterday's lipid profile shows mildly decreased HDL and is otherwise normal. No drug therapy is indicated at this time. I have instructed the patient to follow a low cholesterol diet. I will obtain a lipid profile in 6 months.

## 2017-07-18 NOTE — PROGRESS NOTES
Patient ID:  Shelby Thompson is a 67 y.o. female who presents for follow-up of Hyperlipidemia, unspecified hyperlipidemia type and Statin intolerance  . This problem is improving    Pt has refers that she feels well and has no new complaints;she denies chest pain and shortness of breath. She takes her medicines regularly.        Lab Results   Component Value Date     07/17/2017    K 3.6 07/17/2017     07/17/2017    CO2 30 (H) 07/17/2017    BUN 15 07/17/2017    CREATININE 1.0 07/17/2017    GLU 99 07/17/2017    HGBA1C 6.3 (H) 01/14/2015    MG 2.2 02/16/2008    AST 17 07/17/2017    ALT 14 07/17/2017    ALBUMIN 3.7 07/17/2017    PROT 8.4 07/17/2017    BILITOT 0.2 07/17/2017    WBC 4.01 07/17/2017    HGB 10.3 (L) 07/17/2017    HCT 33.5 (L) 07/17/2017    MCV 78 (L) 07/17/2017     07/17/2017    INR 0.9 03/16/2015    TSH 3.767 03/16/2015         Lab Results   Component Value Date    CHOL 168 07/17/2017    HDL 33 (L) 07/17/2017    TRIG 143 07/17/2017       Lab Results   Component Value Date    LDLCALC 106.4 07/17/2017       Past Medical History:   Diagnosis Date    Allergy     Anemia     Aortic aneurysm     Breast cancer 10/2011    left breast Stage 0 DCIS    Chronic diastolic congestive heart failure 11/6/2015    Colon polyp     GERD (gastroesophageal reflux disease)     History of colonic polyps     HX: breast cancer     Hyperlipemia     Hypertension     ICH (intracerebral hemorrhage)     Major depressive disorder, single episode, mild 6/23/2016    Nuclear sclerosis 7/21/2014    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 7/21/2014    PAD (peripheral artery disease) 11/6/2015    Stroke 4/2011     Hypertension Medications             amlodipine (NORVASC) 5 MG tablet Take 1 tablet (5 mg total) by mouth once daily.    losartan-hydrochlorothiazide 100-25 mg (HYZAAR) 100-25 mg per tablet Take 1 tablet by mouth once daily.            Review of Systems  "  Cardiovascular: Negative for chest pain, irregular heartbeat, leg swelling, near-syncope, orthopnea, palpitations and paroxysmal nocturnal dyspnea.   Respiratory: Negative for shortness of breath and sleep disturbances due to breathing.    Gastrointestinal: Negative for heartburn.                Objective:         /81 (BP Location: Left arm, Patient Position: Sitting, BP Method: Automatic)   Pulse (!) 59   Ht 5' 3" (1.6 m)   Wt 75.7 kg (166 lb 14.2 oz)   BMI 29.56 kg/m²    Physical Exam   Constitutional: She is cooperative. No distress.   Neck: Normal carotid pulses and no JVD present. Carotid bruit is not present. No thyromegaly present.   Cardiovascular: Normal rate, regular rhythm, S1 normal, S2 normal, intact distal pulses and normal pulses.    Murmur heard.   Early systolic murmur is present with a grade of 2/6  at the lower left sternal border  Pulmonary/Chest: Breath sounds normal.   Abdominal: She exhibits no abdominal bruit and no pulsatile midline mass.   Neurological: She is alert.   Skin:   No ankle and pretibial edema.           I have reviewed the following:     Details / Date    [x]   Labs     []   Imaging     [x]   Cardiology Procedures     []   Other      Assessment and Plan:       1. Hyperlipidemia, unspecified hyperlipidemia type    2. Essential hypertension         Hyperlipemia  Yesterday's lipid profile shows mildly decreased HDL and is otherwise normal. No drug therapy is indicated at this time. I have instructed the patient to follow a low cholesterol diet. I will obtain a lipid profile in 6 months.     Essential hypertension  Today's BP is normal. She will continue her present therapy with amlodipine 5 mg qd; losartan/HCTZ 50/12.5 mg.       "

## 2017-07-18 NOTE — PATIENT INSTRUCTIONS
Pt instructed to follow a low cholesterol diet: she should avoid eating pork and beef meat, butter and eggs. Her diet should include white meat (chicken and turkey), vegetables, olive oil (to replace butter) and fruits.

## 2017-07-18 NOTE — ADDENDUM NOTE
Encounter addended by: Anmol Gan MD on: 7/18/2017 10:41 AM<BR>    Actions taken: Diagnosis association updated

## 2017-07-18 NOTE — ASSESSMENT & PLAN NOTE
Today's BP is normal. She will continue her present therapy with amlodipine 5 mg qd; losartan/HCTZ 50/12.5 mg.

## 2017-07-28 RX ORDER — FERROUS GLUCONATE 324(37.5)
TABLET ORAL
Qty: 60 TABLET | Refills: 0 | Status: SHIPPED | OUTPATIENT
Start: 2017-07-28 | End: 2017-09-11 | Stop reason: SDUPTHER

## 2017-08-02 RX ORDER — LOSARTAN POTASSIUM AND HYDROCHLOROTHIAZIDE 25; 100 MG/1; MG/1
1 TABLET ORAL DAILY
Qty: 90 TABLET | Refills: 3 | Status: SHIPPED | OUTPATIENT
Start: 2017-08-02 | End: 2018-08-11 | Stop reason: SDUPTHER

## 2017-08-02 RX ORDER — OMEPRAZOLE 40 MG/1
40 CAPSULE, DELAYED RELEASE ORAL DAILY
Qty: 90 CAPSULE | Refills: 3 | Status: SHIPPED | OUTPATIENT
Start: 2017-08-02 | End: 2018-08-11 | Stop reason: SDUPTHER

## 2017-08-16 ENCOUNTER — TELEPHONE (OUTPATIENT)
Dept: INTERNAL MEDICINE | Facility: CLINIC | Age: 67
End: 2017-08-16

## 2017-09-11 ENCOUNTER — HOSPITAL ENCOUNTER (OUTPATIENT)
Dept: RADIOLOGY | Facility: HOSPITAL | Age: 67
Discharge: HOME | End: 2017-09-11
Attending: SURGERY
Payer: MEDICARE

## 2017-09-11 ENCOUNTER — OFFICE VISIT (OUTPATIENT)
Dept: SURGERY | Facility: CLINIC | Age: 67
End: 2017-09-11
Payer: MEDICARE

## 2017-09-11 VITALS
HEIGHT: 63 IN | HEART RATE: 64 BPM | WEIGHT: 168 LBS | SYSTOLIC BLOOD PRESSURE: 123 MMHG | DIASTOLIC BLOOD PRESSURE: 81 MMHG | TEMPERATURE: 98 F | BODY MASS INDEX: 29.77 KG/M2

## 2017-09-11 VITALS — BODY MASS INDEX: 29.41 KG/M2 | WEIGHT: 166 LBS | HEIGHT: 63 IN

## 2017-09-11 DIAGNOSIS — D05.92 CARCINOMA IN SITU OF LEFT BREAST: ICD-10-CM

## 2017-09-11 DIAGNOSIS — Z85.3 PERSONAL HISTORY OF BREAST CANCER: Primary | ICD-10-CM

## 2017-09-11 PROCEDURE — 99999 PR PBB SHADOW E&M-EST. PATIENT-LVL IV: CPT | Mod: PBBFAC,,, | Performed by: NURSE PRACTITIONER

## 2017-09-11 PROCEDURE — 3008F BODY MASS INDEX DOCD: CPT | Mod: S$GLB,,, | Performed by: NURSE PRACTITIONER

## 2017-09-11 PROCEDURE — 77062 BREAST TOMOSYNTHESIS BI: CPT | Mod: 26,,, | Performed by: RADIOLOGY

## 2017-09-11 PROCEDURE — 1126F AMNT PAIN NOTED NONE PRSNT: CPT | Mod: S$GLB,,, | Performed by: NURSE PRACTITIONER

## 2017-09-11 PROCEDURE — 3074F SYST BP LT 130 MM HG: CPT | Mod: S$GLB,,, | Performed by: NURSE PRACTITIONER

## 2017-09-11 PROCEDURE — 77066 DX MAMMO INCL CAD BI: CPT | Mod: TC

## 2017-09-11 PROCEDURE — 3079F DIAST BP 80-89 MM HG: CPT | Mod: S$GLB,,, | Performed by: NURSE PRACTITIONER

## 2017-09-11 PROCEDURE — 1159F MED LIST DOCD IN RCRD: CPT | Mod: S$GLB,,, | Performed by: NURSE PRACTITIONER

## 2017-09-11 PROCEDURE — 99213 OFFICE O/P EST LOW 20 MIN: CPT | Mod: S$GLB,,, | Performed by: NURSE PRACTITIONER

## 2017-09-11 PROCEDURE — 77066 DX MAMMO INCL CAD BI: CPT | Mod: 26,,, | Performed by: RADIOLOGY

## 2017-09-11 NOTE — PROGRESS NOTES
Subjective:      Patient ID: Shelby Thompson is a 67 y.o. female.    Chief Complaint: Breast Cancer Screening (CBE/Hx of Left Breast Cancer)      HPI: (PF, EPF - 1-3) (Detailed, Comp, - 4) new patient to me presents today for breast cancer surveillance, previously seen by CAMRYN WINSTON and Dr Guevara. Patient denies palpable breast mass, pain, nipple discharge, redness, increased warmth, unexplained weight loss, new onset bone pain    10-4-2011 core biopsy left breast with high grade DCIS, ER/[place] +, HER-2 negative  10- left lumpectomy with residual DCIS, negative margins, negative SN. Adjuvant XRT.   History of CVA, reports some difficulty with memory loss      Review of Systems   Constitutional: Negative for appetite change and fatigue.   Respiratory: Negative for cough and shortness of breath.    Cardiovascular: Negative for chest pain.   Musculoskeletal: Negative for back pain.     Objective:   Physical Exam   Pulmonary/Chest: She exhibits no mass, no tenderness, no laceration, no edema, no deformity, no swelling and no retraction. Right breast exhibits no inverted nipple, no mass, no nipple discharge, no skin change and no tenderness. Left breast exhibits no inverted nipple, no mass, no nipple discharge, no skin change and no tenderness. There is no breast swelling.   S/p left lumpectomy with mild fibrosis associated in the previous surgical site and XRT left UOQ. No upper extremity lymphedema. Breathing non-labored    Lymphadenopathy:     She has no cervical adenopathy.     She has no axillary adenopathy.        Right: No supraclavicular adenopathy present.        Left: No supraclavicular adenopathy present.     Assessment:       1. Personal history of breast cancer        Plan:       bilat diag mmg today, no changes or abnormality reported  Clinically ANDRADE  Return in one year with bilat diag mmg  Call for any interval palpable breast mass, pain, nipple discharge, skin changes or other breast related  concerns

## 2017-09-15 RX ORDER — FERROUS GLUCONATE 324(37.5)
TABLET ORAL
Qty: 60 TABLET | Refills: 2 | Status: SHIPPED | OUTPATIENT
Start: 2017-09-15 | End: 2017-09-29 | Stop reason: SDUPTHER

## 2017-09-29 RX ORDER — FERROUS GLUCONATE 324(37.5)
TABLET ORAL
Qty: 180 TABLET | Refills: 0 | Status: SHIPPED | OUTPATIENT
Start: 2017-09-29 | End: 2018-02-06 | Stop reason: SDUPTHER

## 2017-10-13 RX ORDER — GABAPENTIN 300 MG/1
300 CAPSULE ORAL 2 TIMES DAILY
Qty: 180 CAPSULE | Refills: 3 | Status: SHIPPED | OUTPATIENT
Start: 2017-10-13 | End: 2018-09-30 | Stop reason: SDUPTHER

## 2017-10-16 RX ORDER — TRAMADOL HYDROCHLORIDE 50 MG/1
50 TABLET ORAL 2 TIMES DAILY PRN
Qty: 60 TABLET | Refills: 3 | Status: SHIPPED | OUTPATIENT
Start: 2017-10-16 | End: 2019-04-08 | Stop reason: SDUPTHER

## 2017-10-29 RX ORDER — AMLODIPINE BESYLATE 5 MG/1
5 TABLET ORAL DAILY
Qty: 30 TABLET | Refills: 7 | Status: SHIPPED | OUTPATIENT
Start: 2017-10-29 | End: 2018-05-04 | Stop reason: SDUPTHER

## 2017-11-28 ENCOUNTER — TELEPHONE (OUTPATIENT)
Dept: INTERNAL MEDICINE | Facility: CLINIC | Age: 67
End: 2017-11-28

## 2017-11-28 DIAGNOSIS — E78.00 PURE HYPERCHOLESTEROLEMIA: ICD-10-CM

## 2017-11-28 DIAGNOSIS — D50.0 IRON DEFICIENCY ANEMIA DUE TO CHRONIC BLOOD LOSS: Primary | ICD-10-CM

## 2017-11-28 NOTE — TELEPHONE ENCOUNTER
----- Message from Tonya Coleman sent at 11/28/2017  3:32 PM CST -----  Doctor appointment and lab have been scheduled.  Please link lab orders to the lab appointment.  Date of doctor appointment:  2-26  Physical or EP:  physical  Date of lab appointment:  2-19  Comments: .

## 2017-12-21 ENCOUNTER — OFFICE VISIT (OUTPATIENT)
Dept: INTERNAL MEDICINE | Facility: CLINIC | Age: 67
End: 2017-12-21
Payer: MEDICARE

## 2017-12-21 VITALS
HEART RATE: 67 BPM | BODY MASS INDEX: 30.54 KG/M2 | HEIGHT: 63 IN | WEIGHT: 172.38 LBS | DIASTOLIC BLOOD PRESSURE: 72 MMHG | TEMPERATURE: 99 F | SYSTOLIC BLOOD PRESSURE: 129 MMHG

## 2017-12-21 DIAGNOSIS — J06.9 UPPER RESPIRATORY TRACT INFECTION, UNSPECIFIED TYPE: Primary | ICD-10-CM

## 2017-12-21 PROCEDURE — 99999 PR PBB SHADOW E&M-EST. PATIENT-LVL III: CPT | Mod: PBBFAC,,, | Performed by: INTERNAL MEDICINE

## 2017-12-21 PROCEDURE — 99212 OFFICE O/P EST SF 10 MIN: CPT | Mod: S$GLB,,, | Performed by: INTERNAL MEDICINE

## 2017-12-21 RX ORDER — BENZONATATE 200 MG/1
200 CAPSULE ORAL 3 TIMES DAILY PRN
Qty: 30 CAPSULE | Refills: 0 | Status: SHIPPED | OUTPATIENT
Start: 2017-12-21 | End: 2017-12-31

## 2017-12-21 NOTE — PROGRESS NOTES
Clinic Note  12/21/2017      Subjective:       Patient ID:  Shelby is a 67 y.o. female being seen for an urgent care visit.    Chief Complaint: Generalized Body Aches; Cough; Sore Throat; Headache; and Nasal Congestion    Sore Throat    Associated symptoms include coughing.   Headache    Associated symptoms include coughing, a fever (low grade) and a sore throat.   Influenza   This is a new problem. The current episode started in the past 7 days. The problem has been unchanged. Associated symptoms include chills, coughing, a fever (low grade), myalgias and a sore throat. Nothing aggravates the symptoms. She has tried nothing for the symptoms.       Review of Systems   Constitutional: Positive for chills and fever (low grade).   HENT: Positive for sore throat.    Respiratory: Positive for cough.    Musculoskeletal: Positive for myalgias.       Medication List with Changes/Refills   New Medications    BENZONATATE (TESSALON) 200 MG CAPSULE    Take 1 capsule (200 mg total) by mouth 3 (three) times daily as needed for Cough.   Current Medications    AMLODIPINE (NORVASC) 5 MG TABLET    TAKE 1 TABLET (5 MG TOTAL) BY MOUTH ONCE DAILY.    ASPIRIN 81 MG TAB    Take 1 tablet by mouth Daily.    BISACODYL (DULCOLAX) 5 MG EC TABLET    Take 5 mg by mouth once daily.    FERROUS GLUCONATE 324 MG (37.5 MG IRON) TAB    TAKE 1 TABLET BY MOUTH 2 (TWO) TIMES DAILY WITH MEALS.    FLUZONE HIGH-DOSE 2017-18, PF, 180 MCG/0.5 ML VACCINE    TO BE ADMINISTERED BY PHARMACIST FOR IMMUNIZATION    GABAPENTIN (NEURONTIN) 300 MG CAPSULE    TAKE 1 CAPSULE (300 MG TOTAL) BY MOUTH 2 (TWO) TIMES DAILY.    LOSARTAN-HYDROCHLOROTHIAZIDE 100-25 MG (HYZAAR) 100-25 MG PER TABLET    TAKE 1 TABLET BY MOUTH ONCE DAILY.    OMEPRAZOLE (PRILOSEC) 40 MG CAPSULE    TAKE 1 CAPSULE (40 MG TOTAL) BY MOUTH ONCE DAILY.    SERTRALINE (ZOLOFT) 50 MG TABLET    TAKE 1 TABLET (50 MG TOTAL) BY MOUTH ONCE DAILY.    TRAMADOL (ULTRAM) 50 MG TABLET    Take 1 tablet (50 mg total) by  "mouth 2 (two) times daily as needed for Pain.    VITAMIN D 1000 UNITS TAB    Take 185 mg by mouth once daily.       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    Left ventricular diastolic dysfunction with preserved systolic function    Liver mass    Essential hypertension    Hemiparesis affecting dominant side as late effect of stroke    Recurrent major depressive disorder, in full remission    Statin intolerance           Objective:      /72 (BP Location: Right arm, Patient Position: Sitting, BP Method: Large (Automatic))   Pulse 67   Temp 98.5 °F (36.9 °C) (Oral)   Ht 5' 3" (1.6 m)   Wt 78.2 kg (172 lb 6.4 oz)   LMP  (LMP Unknown)   BMI 30.54 kg/m²   Estimated body mass index is 30.54 kg/m² as calculated from the following:    Height as of this encounter: 5' 3" (1.6 m).    Weight as of this encounter: 78.2 kg (172 lb 6.4 oz).  Physical Exam   Constitutional: She is well-developed, well-nourished, and in no distress.   HENT:   Right Ear: Tympanic membrane normal.   Left Ear: Tympanic membrane normal.   Mouth/Throat: No oropharyngeal exudate, posterior oropharyngeal edema, posterior oropharyngeal erythema or tonsillar abscesses.   Cardiovascular: Normal rate and normal heart sounds.    Pulmonary/Chest: Effort normal and breath sounds normal.         Assessment and Plan:         Problem List Items Addressed This Visit     None      Visit Diagnoses     Upper respiratory tract infection, unspecified type    -  Primary . Tessalon for " cough.  No abx/steroids. Conservative therapy recommended.  Use OTC analgesic medications.  If symptoms worsen please return to clinic.  Patient education instructions given.            Follow Up:   Return if symptoms worsen or fail to improve.        Benjamín Bernard

## 2018-01-16 ENCOUNTER — LAB VISIT (OUTPATIENT)
Dept: LAB | Facility: HOSPITAL | Age: 68
End: 2018-01-16
Payer: MEDICARE

## 2018-01-16 DIAGNOSIS — D50.0 IRON DEFICIENCY ANEMIA DUE TO CHRONIC BLOOD LOSS: ICD-10-CM

## 2018-01-16 DIAGNOSIS — E78.00 PURE HYPERCHOLESTEROLEMIA: ICD-10-CM

## 2018-01-16 LAB
ALBUMIN SERPL BCP-MCNC: 3.8 G/DL
ALP SERPL-CCNC: 71 U/L
ALT SERPL W/O P-5'-P-CCNC: 12 U/L
ANION GAP SERPL CALC-SCNC: 11 MMOL/L
AST SERPL-CCNC: 15 U/L
BASOPHILS # BLD AUTO: 0.02 K/UL
BASOPHILS NFR BLD: 0.5 %
BILIRUB SERPL-MCNC: 0.2 MG/DL
BUN SERPL-MCNC: 12 MG/DL
CALCIUM SERPL-MCNC: 9 MG/DL
CHLORIDE SERPL-SCNC: 105 MMOL/L
CHOLEST SERPL-MCNC: 205 MG/DL
CHOLEST/HDLC SERPL: 6 {RATIO}
CO2 SERPL-SCNC: 26 MMOL/L
CREAT SERPL-MCNC: 1 MG/DL
DIFFERENTIAL METHOD: ABNORMAL
EOSINOPHIL # BLD AUTO: 0.2 K/UL
EOSINOPHIL NFR BLD: 5 %
ERYTHROCYTE [DISTWIDTH] IN BLOOD BY AUTOMATED COUNT: 17.5 %
EST. GFR  (AFRICAN AMERICAN): >60 ML/MIN/1.73 M^2
EST. GFR  (NON AFRICAN AMERICAN): 58 ML/MIN/1.73 M^2
GLUCOSE SERPL-MCNC: 131 MG/DL
HCT VFR BLD AUTO: 34.5 %
HDLC SERPL-MCNC: 34 MG/DL
HDLC SERPL: 16.6 %
HGB BLD-MCNC: 10.6 G/DL
LDLC SERPL CALC-MCNC: 132.8 MG/DL
LYMPHOCYTES # BLD AUTO: 1 K/UL
LYMPHOCYTES NFR BLD: 25.4 %
MCH RBC QN AUTO: 24.4 PG
MCHC RBC AUTO-ENTMCNC: 30.7 G/DL
MCV RBC AUTO: 79 FL
MONOCYTES # BLD AUTO: 0.3 K/UL
MONOCYTES NFR BLD: 8.5 %
NEUTROPHILS # BLD AUTO: 2.4 K/UL
NEUTROPHILS NFR BLD: 60.6 %
NONHDLC SERPL-MCNC: 171 MG/DL
PLATELET # BLD AUTO: 247 K/UL
PMV BLD AUTO: 11.7 FL
POTASSIUM SERPL-SCNC: 3.4 MMOL/L
PROT SERPL-MCNC: 8.8 G/DL
RBC # BLD AUTO: 4.35 M/UL
SODIUM SERPL-SCNC: 142 MMOL/L
TRIGL SERPL-MCNC: 191 MG/DL
WBC # BLD AUTO: 3.98 K/UL

## 2018-01-16 PROCEDURE — 80061 LIPID PANEL: CPT

## 2018-01-16 PROCEDURE — 80053 COMPREHEN METABOLIC PANEL: CPT

## 2018-01-16 PROCEDURE — 85025 COMPLETE CBC W/AUTO DIFF WBC: CPT

## 2018-01-16 PROCEDURE — 36415 COLL VENOUS BLD VENIPUNCTURE: CPT

## 2018-01-19 ENCOUNTER — PES CALL (OUTPATIENT)
Dept: ADMINISTRATIVE | Facility: CLINIC | Age: 68
End: 2018-01-19

## 2018-01-22 NOTE — PROGRESS NOTES
Patient ID:  Shelby Thompson is a 67 y.o. female who presents for follow-up of hyperlipidemia and statin intolerance, hypertension and PAD.    In her usual state of health since last appointment with me on 7/18/2017. Pt has refers that she feels well and has no new complaints; she denies chest pain and shortness of breath. She takes her medicines regularly.  She has not monitored her BP at home.    PMH includes: hypertension; hyperlipidemia; statin intolerance; PAD; diastolic dysfunction by echo; s/p CVA with residual deficit; BMI 30.54      Lab Results   Component Value Date     01/16/2018    K 3.4 (L) 01/16/2018     01/16/2018    CO2 26 01/16/2018    BUN 12 01/16/2018    CREATININE 1.0 01/16/2018     (H) 01/16/2018    HGBA1C 6.3 (H) 01/14/2015    MG 2.2 02/16/2008    AST 15 01/16/2018    ALT 12 01/16/2018    ALBUMIN 3.8 01/16/2018    PROT 8.8 (H) 01/16/2018    BILITOT 0.2 01/16/2018    WBC 3.98 01/16/2018    HGB 10.6 (L) 01/16/2018    HCT 34.5 (L) 01/16/2018    MCV 79 (L) 01/16/2018     01/16/2018    INR 0.9 03/16/2015    TSH 3.767 03/16/2015       Past Medical History:   Diagnosis Date    Allergy     Anemia     Aortic aneurysm     Breast cancer 10/2011    left breast Stage 0 DCIS    Chronic diastolic congestive heart failure 11/6/2015    Colon polyp     GERD (gastroesophageal reflux disease)     History of colonic polyps     HX: breast cancer     Hyperlipemia     Hypertension     ICH (intracerebral hemorrhage)     Major depressive disorder, single episode, mild 6/23/2016    Nuclear sclerosis 7/21/2014    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 7/21/2014    PAD (peripheral artery disease) 11/6/2015    Stroke 4/2011     Family History   Problem Relation Age of Onset    No Known Problems Sister     Cancer Sister 63     Lung Cancer    No Known Problems Mother     Cancer Father     No Known Problems Brother     Hypertension Daughter      Fibroids Daughter      uterine    Hypertension Son     Breast cancer Sister 62    No Known Problems Brother     No Known Problems Maternal Aunt     No Known Problems Maternal Uncle     No Known Problems Paternal Aunt     No Known Problems Paternal Uncle     Hypertension Maternal Grandmother     No Known Problems Maternal Grandfather     No Known Problems Paternal Grandmother     No Known Problems Paternal Grandfather     Ovarian cancer Neg Hx     Colon cancer Neg Hx     Tremor Neg Hx     Amblyopia Neg Hx     Blindness Neg Hx     Cataracts Neg Hx     Diabetes Neg Hx     Glaucoma Neg Hx     Macular degeneration Neg Hx     Retinal detachment Neg Hx     Strabismus Neg Hx     Stroke Neg Hx     Thyroid disease Neg Hx     Esophageal cancer Neg Hx     Rectal cancer Neg Hx     Stomach cancer Neg Hx     Ulcerative colitis Neg Hx     Crohn's disease Neg Hx     Irritable bowel syndrome Neg Hx     Celiac disease Neg Hx      Social History     Social History    Marital status:      Spouse name: N/A    Number of children: N/A    Years of education: N/A     Occupational History    Not on file.     Social History Main Topics    Smoking status: Former Smoker     Packs/day: 0.50     Years: 20.00     Types: Cigarettes     Quit date: 4/2/2011    Smokeless tobacco: Former User     Quit date: 4/3/2011    Alcohol use Yes      Comment: on occasion - one glass wine every 3 months    Drug use: No    Sexual activity: Yes     Partners: Male     Birth control/ protection: Post-menopausal     Other Topics Concern    Not on file     Social History Narrative    No narrative on file       Lab Results   Component Value Date    CHOL 205 (H) 01/16/2018    HDL 34 (L) 01/16/2018    TRIG 191 (H) 01/16/2018       Lab Results   Component Value Date    LDLCALC 132.8 01/16/2018       Past Medical History:   Diagnosis Date    Allergy     Anemia     Aortic aneurysm     Breast cancer 10/2011    left breast  Stage 0 DCIS    Chronic diastolic congestive heart failure 11/6/2015    Colon polyp     GERD (gastroesophageal reflux disease)     History of colonic polyps     HX: breast cancer     Hyperlipemia     Hypertension     ICH (intracerebral hemorrhage)     Major depressive disorder, single episode, mild 6/23/2016    Nuclear sclerosis 7/21/2014    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 7/21/2014    PAD (peripheral artery disease) 11/6/2015    Stroke 4/2011     Hypertension Medications             amLODIPine (NORVASC) 5 MG tablet TAKE 1 TABLET (5 MG TOTAL) BY MOUTH ONCE DAILY.    losartan-hydrochlorothiazide 100-25 mg (HYZAAR) 100-25 mg per tablet TAKE 1 TABLET BY MOUTH ONCE DAILY.            Review of Systems   Constitution: Negative for decreased appetite, diaphoresis, fever, weakness, malaise/fatigue, weight gain and weight loss.   HENT: Negative for congestion, ear discharge, ear pain and nosebleeds.    Eyes: Negative for blurred vision, double vision and visual disturbance.   Cardiovascular: Positive for leg swelling (mild swelling at the end of the day). Negative for chest pain, claudication, cyanosis, dyspnea on exertion, irregular heartbeat, near-syncope, orthopnea, palpitations, paroxysmal nocturnal dyspnea and syncope.   Respiratory: Negative for cough, hemoptysis, shortness of breath, sleep disturbances due to breathing, snoring, sputum production and wheezing.    Endocrine: Negative for polydipsia, polyphagia and polyuria.   Hematologic/Lymphatic: Negative for adenopathy and bleeding problem. Does not bruise/bleed easily.   Skin: Negative for color change, nail changes, poor wound healing and rash.   Musculoskeletal: Negative for muscle cramps and muscle weakness.   Gastrointestinal: Negative for abdominal pain, anorexia, change in bowel habit, heartburn, hematochezia, nausea and vomiting.   Genitourinary: Negative for dysuria, frequency and hematuria.  "  Neurological: Negative for brief paralysis, difficulty with concentration, excessive daytime sleepiness, dizziness, focal weakness, headaches, light-headedness, seizures and vertigo.   Psychiatric/Behavioral: Negative for altered mental status and depression.   Allergic/Immunologic: Negative for persistent infections.                Objective:         /75 (BP Location: Left arm, Patient Position: Sitting, BP Method: Large (Automatic))   Pulse 74   Ht 5' 3" (1.6 m)   Wt 78.5 kg (173 lb 1 oz)   LMP  (LMP Unknown)   SpO2 (!) 93%   BMI 30.66 kg/m²    Physical Exam   Constitutional: She is oriented to person, place, and time. She appears well-developed and well-nourished. She is cooperative. No distress.   HENT:   Head: Normocephalic.   Right Ear: External ear normal.   Left Ear: External ear normal.   Nose: Nose normal.   Inspection of lips, teeth and gums normal   Eyes: Conjunctivae and EOM are normal. Pupils are equal, round, and reactive to light. No scleral icterus.   Neck: Normal range of motion. Normal carotid pulses and no JVD present. Carotid bruit is not present. No tracheal deviation present. No thyromegaly present.   Cardiovascular: Normal rate, regular rhythm, S1 normal, S2 normal, intact distal pulses and normal pulses.  Exam reveals no gallop and no friction rub.    Murmur heard.   Early systolic murmur is present with a grade of 2/6  at the lower left sternal border  Pulses:       Carotid pulses are 2+ on the right side, and 2+ on the left side.       Radial pulses are 2+ on the right side, and 2+ on the left side.   Pulmonary/Chest: Effort normal and breath sounds normal. No respiratory distress. She has no wheezes. She has no rales. She exhibits no tenderness.   Abdominal: Soft. Bowel sounds are normal. She exhibits no distension, no abdominal bruit and no pulsatile midline mass. There is no hepatosplenomegaly. There is no tenderness. There is no guarding.   Musculoskeletal: Normal range " of motion. She exhibits no edema or tenderness.   Lymphadenopathy:   Palpation of lymph nodes of neck and groin normal   Neurological: She is alert and oriented to person, place, and time. No cranial nerve deficit. She exhibits normal muscle tone. Coordination normal.   Skin: Skin is warm and dry. No rash noted. No erythema. No pallor.   No ankle and pretibial edema.   Psychiatric: She has a normal mood and affect. Her behavior is normal. Judgment and thought content normal.           ECG (19-OCT-2012)  Normal sinus rhythm  Possible Inferior infarct (cited on or before 19-OCT-2012)  Abnormal ECG  When compared with ECG of 24-OCT-2011 13:26,  T wave inversion more evident in Lateral leads        Echocardiogram (3/25/2015)    Aorta: The aortic root is normal in size, measuring 3.5 cm at sinotubular junction and 3.7 cm at Sinuses of Valsalva. The proximal ascending aorta is normal in size, measuring 3.8 cm across.     Left Atrium: The left atrial volume index is normal, measuring 26.54 cc/m2.     Left Ventricle: The left ventricle is normal in size, with an end-diastolic diameter of 4.8 cm, and an end-systolic diameter of 3.1 cm. LV wall thickness is normal, with the septum and the posterior wall each measuring 0.8 cm across. Relative wall   thickness was normal at 0.33, and the LV mass index was 82.3 g/m2 consistent with normal left ventricular mass. The following segments were hypokinetic: apical septum.  Global left ventricular systolic function appears normal. Visually estimated ejection fraction is 60-65%. The LV Doppler derived stroke volume equals 57.0 ccs.   There is normal systolic/diastolic flow in the pulmonary vein indicating normal left atrial pressures. The E/e'(lat) is 13, consistent with diastolic dysfunction secondary to relaxation abnormality.     Right Atrium: The right atrium is normal in size, measuring 4.5 cm in length and 1.9 cm in width in the apical view.     Right Ventricle: The right  ventricle is normal in size measuring 3.6 cm at the base in the apical right ventricle-focused view. Global right ventricular systolic function appears normal. Tricuspid annular plane systolic excursion (TAPSE) is 1.6 cm. The   estimated PA systolic pressure is 37 mmHg.     Aortic Valve:  The aortic valve is mildly sclerotic with normal leaflet mobility. The peak velocity obtained across the aortic valve is 1.7 m/s, which translates to a peak gradient of 12.0 mmHg. The mean gradient is 7.0 mmHg. Using a left ventricular   outflow tract diameter of 2.1 cm, a left ventricular outflow tract velocity time integral of 16.82 cm, and a peak instantaneous transvalvular velocity time integral of 26.5 cm, the calculated aortic valve area is 2.16 cm2. Additionally, there is trivial   aortic regurgitation.     Mitral Valve:  The mitral valve is mildly sclerotic. There is trivial mitral regurgitation.     Tricuspid Valve:  There is moderate tricuspid regurgitation. Tricuspid valve is normal in structure with normal leaflet mobility.     Pulmonary Valve:  Pulmonary valve is normal in structure with normal leaflet mobility.     IVC: IVC is normal in size and collapses > 50% with a sniff, suggesting normal right atrial pressure of 3 mmHg.     Intracavitary: There is no evidence of pericardial effusion, intracavity mass, thrombi, or vegetation.     CONCLUSIONS     1 - Normal left ventricular systolic function (EF 60-65%).     2 - Left ventricular diastolic dysfunction.     3 - Normal right ventricular systolic function .     4 - Trivial aortic regurgitation.     5 - Trivial mitral regurgitation.     6 - Moderate tricuspid regurgitation.     This document has been electronically    SIGNED BY: Storm Oconnor MD        Segmental Pressures Low Extremities (3/25/2015)    Resting CAILIN:    The right ankle brachial index was 1.06 which is normal.   The left ankle brachial index was 1.04 which is normal.   The right TBI is 0.85.   The left TBI  is 0.70.     Resting Segmental Pressures:    The right low thigh pressure was significantly reduced with a low thigh pressure / brachial pressure of 1.13 suggesting significant right iliofemoral disease.     Left high thigh cuff pressure is markedly elevated and unreliable.The left low thigh pressure was significantly reduced with a low thigh pressure / brachial pressure of 1.38 suggesting significant left iliofemoral disease. The left below knee pressure was significantly reduced suggesting significant left femoral-popliteal obstruction.     Waveform analysis are compatible with the above findings.     TEST DESCRIPTION   Resting segmental pressures were obtained.     This document has been electronically    SIGNED BY: Bharat Choudhary MD       I have reviewed the following:     Details / Date    []   Labs     []   Imaging     []   Cardiology Procedures     []   Other      Assessment and Plan:       1. Pure hypercholesterolemia    2. Statin intolerance    3. PAD (peripheral artery disease)    4. Essential hypertension         Hyperlipemia  Lipid panel shows: .9; HD L34;     Start ezetimibe 10 mg qd  Lipid panel and CMP prior to next appointment   RTC in 3 months    PAD (peripheral artery disease)  Asymptomatic    Essential hypertension  Well controlled on present therapy. She prefers not to be enrolled in Digital Hypertension    Home BP monitoring and bring log at the time of next appointment  Continue presen therapy:  Amlodipine 5 mg qd  Losartan-HCTZ 100-25 mg qd  RTC in 3 months

## 2018-01-23 ENCOUNTER — OFFICE VISIT (OUTPATIENT)
Dept: CARDIOLOGY | Facility: CLINIC | Age: 68
End: 2018-01-23
Payer: MEDICARE

## 2018-01-23 VITALS
DIASTOLIC BLOOD PRESSURE: 75 MMHG | OXYGEN SATURATION: 93 % | WEIGHT: 173.06 LBS | HEART RATE: 74 BPM | HEIGHT: 63 IN | BODY MASS INDEX: 30.66 KG/M2 | SYSTOLIC BLOOD PRESSURE: 136 MMHG

## 2018-01-23 DIAGNOSIS — I10 ESSENTIAL HYPERTENSION: ICD-10-CM

## 2018-01-23 DIAGNOSIS — I73.9 PAD (PERIPHERAL ARTERY DISEASE): ICD-10-CM

## 2018-01-23 DIAGNOSIS — Z78.9 STATIN INTOLERANCE: ICD-10-CM

## 2018-01-23 DIAGNOSIS — E78.00 PURE HYPERCHOLESTEROLEMIA: Primary | ICD-10-CM

## 2018-01-23 PROCEDURE — 99999 PR PBB SHADOW E&M-EST. PATIENT-LVL IV: CPT | Mod: PBBFAC,,, | Performed by: INTERNAL MEDICINE

## 2018-01-23 PROCEDURE — 99213 OFFICE O/P EST LOW 20 MIN: CPT | Mod: S$GLB,,, | Performed by: INTERNAL MEDICINE

## 2018-01-23 PROCEDURE — 99499 UNLISTED E&M SERVICE: CPT | Mod: S$GLB,,, | Performed by: INTERNAL MEDICINE

## 2018-01-23 RX ORDER — EZETIMIBE 10 MG/1
10 TABLET ORAL DAILY
Qty: 90 TABLET | Refills: 3 | Status: SHIPPED | OUTPATIENT
Start: 2018-01-23 | End: 2018-02-12

## 2018-01-23 NOTE — ASSESSMENT & PLAN NOTE
Well controlled on present therapy. She prefers not to be enrolled in Digital Hypertension    Home BP monitoring and bring log at the time of next appointment  Continue presen therapy:  Amlodipine 5 mg qd  Losartan-HCTZ 100-25 mg qd  RTC in 3 months

## 2018-01-23 NOTE — ASSESSMENT & PLAN NOTE
Lipid panel shows: .9; HD L34;     Start ezetimibe 10 mg qd  Lipid panel and CMP prior to next appointment   RTC in 3 months

## 2018-01-29 ENCOUNTER — PATIENT MESSAGE (OUTPATIENT)
Dept: CARDIOLOGY | Facility: CLINIC | Age: 68
End: 2018-01-29

## 2018-01-29 ENCOUNTER — TELEPHONE (OUTPATIENT)
Dept: CARDIOLOGY | Facility: CLINIC | Age: 68
End: 2018-01-29

## 2018-01-30 NOTE — TELEPHONE ENCOUNTER
Pt developed severe headache as a side effect of ezetimibe and stopped taking the drug after 5 days. She wishes to try diet first and if it fails to control her LDL I will prescribe evolocumab.    Anmol Gan

## 2018-02-07 RX ORDER — FERROUS GLUCONATE 324(37.5)
TABLET ORAL
Qty: 180 TABLET | Refills: 0 | Status: SHIPPED | OUTPATIENT
Start: 2018-02-07 | End: 2021-12-17

## 2018-02-09 ENCOUNTER — PATIENT MESSAGE (OUTPATIENT)
Dept: INTERNAL MEDICINE | Facility: CLINIC | Age: 68
End: 2018-02-09

## 2018-02-12 ENCOUNTER — OFFICE VISIT (OUTPATIENT)
Dept: INTERNAL MEDICINE | Facility: CLINIC | Age: 68
End: 2018-02-12
Payer: MEDICARE

## 2018-02-12 VITALS
HEART RATE: 58 BPM | WEIGHT: 172.19 LBS | DIASTOLIC BLOOD PRESSURE: 86 MMHG | SYSTOLIC BLOOD PRESSURE: 130 MMHG | BODY MASS INDEX: 30.51 KG/M2 | HEIGHT: 63 IN

## 2018-02-12 DIAGNOSIS — H66.92 LEFT OTITIS MEDIA, UNSPECIFIED OTITIS MEDIA TYPE: ICD-10-CM

## 2018-02-12 DIAGNOSIS — D50.0 IRON DEFICIENCY ANEMIA DUE TO CHRONIC BLOOD LOSS: ICD-10-CM

## 2018-02-12 DIAGNOSIS — F33.42 RECURRENT MAJOR DEPRESSIVE DISORDER, IN FULL REMISSION: Primary | ICD-10-CM

## 2018-02-12 DIAGNOSIS — E78.00 PURE HYPERCHOLESTEROLEMIA: ICD-10-CM

## 2018-02-12 DIAGNOSIS — Z78.9 STATIN INTOLERANCE: ICD-10-CM

## 2018-02-12 DIAGNOSIS — I69.30 HISTORY OF CVA WITH RESIDUAL DEFICIT: ICD-10-CM

## 2018-02-12 PROBLEM — I69.359 HEMIPARESIS AFFECTING DOMINANT SIDE AS LATE EFFECT OF STROKE: Status: RESOLVED | Noted: 2017-02-13 | Resolved: 2018-02-12

## 2018-02-12 PROCEDURE — 1125F AMNT PAIN NOTED PAIN PRSNT: CPT | Mod: S$GLB,,, | Performed by: INTERNAL MEDICINE

## 2018-02-12 PROCEDURE — 3008F BODY MASS INDEX DOCD: CPT | Mod: S$GLB,,, | Performed by: INTERNAL MEDICINE

## 2018-02-12 PROCEDURE — 99499 UNLISTED E&M SERVICE: CPT | Mod: S$GLB,,, | Performed by: INTERNAL MEDICINE

## 2018-02-12 PROCEDURE — 99999 PR PBB SHADOW E&M-EST. PATIENT-LVL III: CPT | Mod: PBBFAC,,, | Performed by: INTERNAL MEDICINE

## 2018-02-12 PROCEDURE — 99214 OFFICE O/P EST MOD 30 MIN: CPT | Mod: S$GLB,,, | Performed by: INTERNAL MEDICINE

## 2018-02-12 PROCEDURE — 1159F MED LIST DOCD IN RCRD: CPT | Mod: S$GLB,,, | Performed by: INTERNAL MEDICINE

## 2018-02-12 RX ORDER — AMOXICILLIN AND CLAVULANATE POTASSIUM 875; 125 MG/1; MG/1
1 TABLET, FILM COATED ORAL 2 TIMES DAILY
Qty: 14 TABLET | Refills: 0 | Status: SHIPPED | OUTPATIENT
Start: 2018-02-12 | End: 2018-02-19

## 2018-02-12 NOTE — PROGRESS NOTES
HISTORY OF PRESENT ILLNESS:  She is a 67-year-old female who has had a URI for   about a month and a half, went to urgent care, got some type of antibiotic,   probably Z-LAKSHMI, either late December or early January, but then she has had a   little sinus congestion, but she had a left ear pain starting Saturday.  She has   been using a bulb to kind of try to wash her ear out.  She denies any fevers or   chills, but she has been having left ear pain.  Her right ear just started and   she has had a lot of sinus congestion.  She denies any nausea, vomiting,   palpitations.  No PND or orthopnea.  She has past medical history including   statin intolerance.  She got put on some Zetia, but it is causing her such   headaches that she has actually stopped that and that was earlier last month.    She has also, like I said, intolerance to statins.  She has a history of stroke   with some dysarthria still left and some residual effect.  She also has history   of iron deficiency anemia, hyperlipidemia and history of depression.    PHYSICAL EXAMINATION:  GENERAL:  She is a well-appearing 67-year-old female in no acute distress.  She   is still weak on her left side from the stroke.  NECK:  Supple.  HEENT:  Oropharynx is clear.  She does have a very nasally voice today, it is   like she is congested.  Her right ear, TM is clear, a little bit of redness   around it, but good light reflex, no fluid behind it.  Left ear is a different   situation, it is red, the ear canals swollen, just possibly rupture of the TM,   also she has had some purulent drainage from this area.    ASSESSMENT:  Left otitis media with possible tympanic membrane rupture.  I will   put her on some Augmentin for this.  She needs to stop using that ear bulb that   her  demonstrated.  She has hyperlipidemia, intolerance to statins and   now sounds like the Zetia was causing her headaches.  She has a supple neck and   she is moving her head okay and the  headaches have gotten a lot better since she   got off of that.  She has iron deficiency anemia.  She says that   for the   iron she needs.  We discussed it being over-the-counter, she can go get it.    History of cerebrovascular accident, like to get her on a statin and get her   cholesterol down.  We are going to discuss that.  I will see her back in two   weeks.  Family is going to let me know how she is doing next week.  If she   starts having fevers or anything, she needs to go to the Emergency Room though.      PIEDAD  dd: 02/12/2018 09:51:14 (CST)  td: 02/13/2018 02:15:04 (CST)  Doc ID   #[id number]  Job ID #[id number]    CC:

## 2018-02-19 ENCOUNTER — TELEPHONE (OUTPATIENT)
Dept: INTERNAL MEDICINE | Facility: CLINIC | Age: 68
End: 2018-02-19

## 2018-02-19 NOTE — TELEPHONE ENCOUNTER
Good morning Dr. Arevalo, this patient is in the clinic at the  now, she states she is supposed to have labs done but there are none ordered to attach to the lab appointment.  Thank you.

## 2018-02-26 ENCOUNTER — OFFICE VISIT (OUTPATIENT)
Dept: INTERNAL MEDICINE | Facility: CLINIC | Age: 68
End: 2018-02-26
Payer: MEDICARE

## 2018-02-26 VITALS
BODY MASS INDEX: 30.82 KG/M2 | DIASTOLIC BLOOD PRESSURE: 84 MMHG | WEIGHT: 173.94 LBS | SYSTOLIC BLOOD PRESSURE: 138 MMHG | HEART RATE: 64 BPM | HEIGHT: 63 IN

## 2018-02-26 DIAGNOSIS — R25.1 TREMOR: ICD-10-CM

## 2018-02-26 DIAGNOSIS — Z00.00 ENCOUNTER FOR PREVENTIVE HEALTH EXAMINATION: Primary | ICD-10-CM

## 2018-02-26 DIAGNOSIS — Z85.3 PERSONAL HISTORY OF MALIGNANT NEOPLASM OF BREAST: ICD-10-CM

## 2018-02-26 DIAGNOSIS — I69.30 HISTORY OF CVA WITH RESIDUAL DEFICIT: ICD-10-CM

## 2018-02-26 DIAGNOSIS — I10 ESSENTIAL HYPERTENSION: ICD-10-CM

## 2018-02-26 DIAGNOSIS — G56.92: ICD-10-CM

## 2018-02-26 DIAGNOSIS — F33.42 RECURRENT MAJOR DEPRESSIVE DISORDER, IN FULL REMISSION: ICD-10-CM

## 2018-02-26 DIAGNOSIS — I70.0 AORTIC ATHEROSCLEROSIS: ICD-10-CM

## 2018-02-26 DIAGNOSIS — H40.013 OPEN ANGLE WITH BORDERLINE FINDINGS AND LOW GLAUCOMA RISK IN BOTH EYES: ICD-10-CM

## 2018-02-26 DIAGNOSIS — I69.322 DYSARTHRIA AS LATE EFFECT OF CEREBROVASCULAR ACCIDENT (CVA): ICD-10-CM

## 2018-02-26 DIAGNOSIS — I71.20 THORACIC AORTIC ANEURYSM WITHOUT RUPTURE: ICD-10-CM

## 2018-02-26 DIAGNOSIS — E78.5 HYPERLIPIDEMIA, UNSPECIFIED HYPERLIPIDEMIA TYPE: ICD-10-CM

## 2018-02-26 DIAGNOSIS — I73.9 PAD (PERIPHERAL ARTERY DISEASE): ICD-10-CM

## 2018-02-26 DIAGNOSIS — D50.8 OTHER IRON DEFICIENCY ANEMIA: ICD-10-CM

## 2018-02-26 DIAGNOSIS — I51.89 LEFT VENTRICULAR DIASTOLIC DYSFUNCTION WITH PRESERVED SYSTOLIC FUNCTION: ICD-10-CM

## 2018-02-26 DIAGNOSIS — M85.80 OSTEOPENIA, UNSPECIFIED LOCATION: ICD-10-CM

## 2018-02-26 PROCEDURE — 99499 UNLISTED E&M SERVICE: CPT | Mod: S$GLB,,, | Performed by: NURSE PRACTITIONER

## 2018-02-26 PROCEDURE — G0439 PPPS, SUBSEQ VISIT: HCPCS | Mod: S$GLB,,, | Performed by: NURSE PRACTITIONER

## 2018-02-26 PROCEDURE — 99999 PR PBB SHADOW E&M-EST. PATIENT-LVL IV: CPT | Mod: PBBFAC,,, | Performed by: NURSE PRACTITIONER

## 2018-02-26 NOTE — PATIENT INSTRUCTIONS
Counseling and Referral of Other Preventative  (Italic type indicates deductible and co-insurance are waived)    Patient Name: Shelby Thompson  Today's Date: 2/26/2018    Health Maintenance       Date Due Completion Date    Mammogram 09/11/2018 9/11/2017    DEXA SCAN 01/11/2020 1/11/2017    Lipid Panel 01/16/2023 1/16/2018    Colonoscopy 03/26/2025 3/26/2015    TETANUS VACCINE 06/23/2026 6/23/2016    Override on 6/23/2016: Done        No orders of the defined types were placed in this encounter.    The following information is provided to all patients.  This information is to help you find resources for any of the problems found today that may be affecting your health:                Living healthy guide: www.[facility].[place].gov      Understanding Diabetes: www.diabetes.org      Eating healthy: www.cdc.gov/healthyweight      [facility] home safety checklist: www.cdc.gov/steadi/patient.html      Agency on Aging: www.goea.[place].gov      Alcoholics anonymous (AA): www.aa.org      Physical Activity: www.joseline.nih.gov/wz8ujaa      Tobacco use: www.quitwithusla.org

## 2018-02-26 NOTE — PROGRESS NOTES
"Shelby Thompson presented for a  Medicare AWV and comprehensive Health Risk Assessment today. The following components were reviewed and updated:    · Medical history  · Family History  · Social history  · Allergies and Current Medications  · Health Risk Assessment  · Health Maintenance  · Care Team     ** See Completed Assessments for Annual Wellness Visit within the encounter summary.**       The following assessments were completed:  · Living Situation  · CAGE  · Depression Screening  · Timed Get Up and Go  · Whisper Test  · Cognitive Function Screening - unable to draw clock secondary to right hemiparesis/tremor  · Nutrition Screening  · ADL Screening  · PAQ Screening    Vitals:    02/26/18 0941   BP: 138/84   BP Location: Left arm   Pulse: 64   Weight: 78.9 kg (173 lb 15.1 oz)   Height: 5' 3" (1.6 m)     Body mass index is 30.81 kg/m².  Physical Exam   Constitutional: She is oriented to person, place, and time.   Overweight   HENT:   Head: Normocephalic.   Cardiovascular: Normal rate and regular rhythm.    Pulmonary/Chest: Effort normal and breath sounds normal.   Abdominal: Soft. Bowel sounds are normal.   Obese   Musculoskeletal: Normal range of motion. She exhibits no edema.   Neurological: She is alert and oriented to person, place, and time.   + mild dysarthria   Skin: Skin is warm and dry.   Psychiatric: She has a normal mood and affect.   Nursing note and vitals reviewed.        Diagnoses and health risks identified today and associated recommendations/orders:    1. Encounter for preventive health examination  Here for Health Risk Assessment/Annual Wellness Visit.  Follow up in one year.    2. Essential hypertension  Chronic, stable on current medications. Followed by PCP.    3. Aortic atherosclerosis  Chronic, stable on current medications. Noted CT chest 4/23/15. Followed by PCP.    4. Left ventricular diastolic dysfunction with preserved systolic function  Chronic, stable on current medications. Noted " ECHO 3/25/15. Followed by PCP.    5. PAD (peripheral artery disease)  Chronic, stable.  Followed by PCP, Cardiology, Vascular Surgery.    6. Thoracic aortic aneurysm without rupture  Chronic, stable. Followed by PCP, Vascular Surgery.    7. Hyperlipidemia, unspecified hyperlipidemia type  Chronic, stable with diet, statin intolerant. Followed by PCP, Cardiology.    8. Recurrent major depressive disorder, in full remission  Chronic, stable. Reports she stopped sertraline secondary to side effects. PHQ-2 score 0. . Followed by PCP.    9. Personal history of malignant neoplasm of breast  Stable.  [facility]    10. Other iron deficiency anemia  Chronic, stable on current medication. Followed by PCP, Gastroenterology.    11. Tremor  Chronic, stable. Followed by PCP.    12. Mononeuritis of left upper extremity  Chronic, stable. Followed by PCP.    13. History of CVA with residual deficit  Stable on current medications. Followed by PCP.    14. 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 Followed by PCP.    15. 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  Chronic, stable. Followed by Optometry.    16. Osteopenia, unspecified location  Chronic, stable. Followed by PCP.      Provided Shelby with a 5-10 year written screening schedule and personal prevention plan. Recommendations were developed using the USPSTF age appropriate recommendations. Education, counseling, and referrals were provided as needed. After Visit Summary printed and given to patient which includes a list of additional screenings\tests needed.    Follow-up in about 4 weeks (around 3/26/2018).with PCP    Kat Antonio NP

## 2018-04-19 DIAGNOSIS — F32.A DEPRESSION: ICD-10-CM

## 2018-04-20 ENCOUNTER — LAB VISIT (OUTPATIENT)
Dept: LAB | Facility: HOSPITAL | Age: 68
End: 2018-04-20
Attending: INTERNAL MEDICINE
Payer: MEDICARE

## 2018-04-20 DIAGNOSIS — E78.00 PURE HYPERCHOLESTEROLEMIA: ICD-10-CM

## 2018-04-20 LAB
ALBUMIN SERPL BCP-MCNC: 3.8 G/DL
ALP SERPL-CCNC: 61 U/L
ALT SERPL W/O P-5'-P-CCNC: 16 U/L
ANION GAP SERPL CALC-SCNC: 12 MMOL/L
AST SERPL-CCNC: 19 U/L
BILIRUB SERPL-MCNC: 0.4 MG/DL
BUN SERPL-MCNC: 16 MG/DL
CALCIUM SERPL-MCNC: 9.5 MG/DL
CHLORIDE SERPL-SCNC: 103 MMOL/L
CHOLEST SERPL-MCNC: 225 MG/DL
CHOLEST/HDLC SERPL: 5.9 {RATIO}
CO2 SERPL-SCNC: 28 MMOL/L
CREAT SERPL-MCNC: 1.1 MG/DL
EST. GFR  (AFRICAN AMERICAN): 59 ML/MIN/1.73 M^2
EST. GFR  (NON AFRICAN AMERICAN): 51 ML/MIN/1.73 M^2
GLUCOSE SERPL-MCNC: 115 MG/DL
HDLC SERPL-MCNC: 38 MG/DL
HDLC SERPL: 16.9 %
LDLC SERPL CALC-MCNC: 147.4 MG/DL
NONHDLC SERPL-MCNC: 187 MG/DL
POTASSIUM SERPL-SCNC: 3.3 MMOL/L
PROT SERPL-MCNC: 8.7 G/DL
SODIUM SERPL-SCNC: 143 MMOL/L
TRIGL SERPL-MCNC: 198 MG/DL

## 2018-04-20 PROCEDURE — 80053 COMPREHEN METABOLIC PANEL: CPT

## 2018-04-20 PROCEDURE — 36415 COLL VENOUS BLD VENIPUNCTURE: CPT

## 2018-04-20 PROCEDURE — 80061 LIPID PANEL: CPT

## 2018-04-21 RX ORDER — SERTRALINE HYDROCHLORIDE 50 MG/1
TABLET, FILM COATED ORAL
Qty: 90 TABLET | Refills: 4 | Status: SHIPPED | OUTPATIENT
Start: 2018-04-21 | End: 2019-05-21

## 2018-04-23 ENCOUNTER — OFFICE VISIT (OUTPATIENT)
Dept: CARDIOLOGY | Facility: CLINIC | Age: 68
End: 2018-04-23
Payer: MEDICARE

## 2018-04-23 ENCOUNTER — CLINICAL SUPPORT (OUTPATIENT)
Dept: CARDIOLOGY | Facility: CLINIC | Age: 68
End: 2018-04-23
Attending: INTERNAL MEDICINE
Payer: MEDICARE

## 2018-04-23 ENCOUNTER — TELEPHONE (OUTPATIENT)
Dept: PHARMACY | Facility: HOSPITAL | Age: 68
End: 2018-04-23

## 2018-04-23 VITALS
WEIGHT: 170 LBS | DIASTOLIC BLOOD PRESSURE: 70 MMHG | HEIGHT: 63 IN | HEART RATE: 58 BPM | BODY MASS INDEX: 30.12 KG/M2 | SYSTOLIC BLOOD PRESSURE: 131 MMHG

## 2018-04-23 DIAGNOSIS — I73.9 PAD (PERIPHERAL ARTERY DISEASE): Primary | ICD-10-CM

## 2018-04-23 DIAGNOSIS — R00.2 PALPITATIONS: ICD-10-CM

## 2018-04-23 DIAGNOSIS — E78.00 PURE HYPERCHOLESTEROLEMIA: ICD-10-CM

## 2018-04-23 DIAGNOSIS — I10 ESSENTIAL HYPERTENSION: ICD-10-CM

## 2018-04-23 DIAGNOSIS — I73.9 PAD (PERIPHERAL ARTERY DISEASE): ICD-10-CM

## 2018-04-23 PROCEDURE — 3078F DIAST BP <80 MM HG: CPT | Mod: CPTII,S$GLB,, | Performed by: INTERNAL MEDICINE

## 2018-04-23 PROCEDURE — 99999 PR PBB SHADOW E&M-EST. PATIENT-LVL IV: CPT | Mod: PBBFAC,,, | Performed by: INTERNAL MEDICINE

## 2018-04-23 PROCEDURE — 93924 LWR XTR VASC STDY BILAT: CPT | Mod: S$GLB,,, | Performed by: INTERNAL MEDICINE

## 2018-04-23 PROCEDURE — 99499 UNLISTED E&M SERVICE: CPT | Mod: S$GLB,,, | Performed by: INTERNAL MEDICINE

## 2018-04-23 PROCEDURE — 3075F SYST BP GE 130 - 139MM HG: CPT | Mod: CPTII,S$GLB,, | Performed by: INTERNAL MEDICINE

## 2018-04-23 PROCEDURE — 93000 ELECTROCARDIOGRAM COMPLETE: CPT | Mod: S$GLB,,, | Performed by: INTERNAL MEDICINE

## 2018-04-23 PROCEDURE — 99213 OFFICE O/P EST LOW 20 MIN: CPT | Mod: S$GLB,,, | Performed by: INTERNAL MEDICINE

## 2018-04-23 RX ORDER — POTASSIUM CHLORIDE 1.5 G/1.58G
20 POWDER, FOR SOLUTION ORAL ONCE
Qty: 1 PACKET | Refills: 0 | Status: SHIPPED | OUTPATIENT
Start: 2018-04-23 | End: 2018-04-23

## 2018-04-23 NOTE — ASSESSMENT & PLAN NOTE
Well controlled on present therapy. Hypokalemia (k 3.3). She prefers not to be enrolled in Digital Hypertension     Continue presen therapy:  Amlodipine 5 mg qd  Losartan-HCTZ 100-25 mg qd    Start potassium supplement 20 mEq qd

## 2018-04-23 NOTE — PROGRESS NOTES
Cardiology Progress Note:    Patient ID:  Shelby Thompson is a 68 y.o. female who presents forfollow-up of hyperlipidemia and statin intolerance, hypertension and PAD.      History of Present Illness: Pt refers that when she walks she develops discomfort in the right foot and calf as well as in the left foot. It is unclear how long she needs to walk before developing leg discomfort. Symptoms resolve with rest.    She did not tolerate ezetimibe because of very severe headaches and discontinued the drug after 5 days.    Lipid panel (4/20/2018) shows .4; HDL 38; total cholesterol 225    No home BP monitoring.     K 3.3    Past Medical History Includes:  hypertension; hyperlipidemia; statin intolerance; PAD; diastolic dysfunction by echo; s/p CVA with residual deficit; BMI 30.54    Full Medication List Includes:  Outpatient Encounter Prescriptions as of 4/23/2018   Medication Sig Dispense Refill    ACETAMINOPHEN/CHLORPHENIRAMINE (CORICIDIN ORAL) Take 1 tablet by mouth every 6 to 8 hours as needed.      amLODIPine (NORVASC) 5 MG tablet TAKE 1 TABLET (5 MG TOTAL) BY MOUTH ONCE DAILY. 30 tablet 7    aspirin 81 mg Tab Take 1 tablet by mouth Daily.      bisacodyl (DULCOLAX) 5 mg EC tablet Take 5 mg by mouth once daily.      ferrous gluconate 324 mg (37.5 mg iron) Tab TAKE 1 TABLET BY MOUTH 2 (TWO) TIMES DAILY WITH MEALS. 180 tablet 0    gabapentin (NEURONTIN) 300 MG capsule TAKE 1 CAPSULE (300 MG TOTAL) BY MOUTH 2 (TWO) TIMES DAILY. 180 capsule 3    losartan-hydrochlorothiazide 100-25 mg (HYZAAR) 100-25 mg per tablet TAKE 1 TABLET BY MOUTH ONCE DAILY. 90 tablet 3    MV-MIN/VIT C/GLUT/LYSINE/ (A[place]NE, LYSINE HCL, ORAL) Take 1 tablet by mouth 2 (two) times daily.      omeprazole (PRILOSEC) 40 MG capsule TAKE 1 CAPSULE (40 MG TOTAL) BY MOUTH ONCE DAILY. 90 capsule 3    sertraline (ZOLOFT) 50 MG tablet TAKE 1 TABLET (50 MG TOTAL) BY MOUTH ONCE DAILY. 90 tablet 4    tramadol (ULTRAM) 50 mg tablet Take 1  tablet (50 mg total) by mouth 2 (two) times daily as needed for Pain. 60 tablet 3    vitamin D 1000 units Tab Take 185 mg by mouth once daily.      alirocumab (PRALUENT PEN) 75 mg/mL PnIj Inject 75 mg SQ every two weeks 60 Syringe 6    potassium chloride (KLOR-CON) 20 mEq Pack Take 20 mEq by mouth once. 1 packet 0     No facility-administered encounter medications on file as of 4/23/2018.        Review of Systems:  Review of Systems   Constitution: Negative for decreased appetite, diaphoresis, fever, weakness, malaise/fatigue, weight gain and weight loss.   HENT: Negative for congestion, ear discharge, ear pain and nosebleeds.    Eyes: Negative for blurred vision, double vision and visual disturbance.   Cardiovascular: Positive for leg swelling (mild swelling at the end of the day). Negative for chest pain, claudication, cyanosis, dyspnea on exertion, irregular heartbeat, near-syncope, orthopnea, palpitations, paroxysmal nocturnal dyspnea and syncope.   Respiratory: Negative for cough, hemoptysis, shortness of breath, sleep disturbances due to breathing, snoring, sputum production and wheezing.    Endocrine: Negative for polydipsia, polyphagia and polyuria.   Hematologic/Lymphatic: Negative for adenopathy and bleeding problem. Does not bruise/bleed easily.   Skin: Negative for color change, nail changes, poor wound healing and rash.   Musculoskeletal: Negative for muscle cramps and muscle weakness.   Gastrointestinal: Negative for abdominal pain, anorexia, change in bowel habit, heartburn, hematochezia, nausea and vomiting.   Genitourinary: Negative for dysuria, frequency and hematuria.   Neurological: Negative for brief paralysis, difficulty with concentration, excessive daytime sleepiness, dizziness, focal weakness, headaches, light-headedness, seizures and vertigo.   Psychiatric/Behavioral: Negative for altered mental status and depression.   Allergic/Immunologic: Negative for persistent infections.  "      Physical Exam:  /70 (BP Location: Left arm, Patient Position: Sitting, BP Method: Medium (Automatic))   Pulse (!) 58   Ht 5' 3" (1.6 m)   Wt 77.1 kg (169 lb 15.6 oz)   LMP  (LMP Unknown)   BMI 30.11 kg/m²   Physical Exam   Constitutional: She is oriented to person, place, and time. She appears well-developed and well-nourished. She is cooperative. No distress.   HENT:   Head: Normocephalic.   Right Ear: External ear normal.   Left Ear: External ear normal.   Nose: Nose normal.   Inspection of lips, teeth and gums normal   Eyes: Conjunctivae and EOM are normal. Pupils are equal, round, and reactive to light. No scleral icterus.   Neck: Normal range of motion. Normal carotid pulses and no JVD present. Carotid bruit is not present. No tracheal deviation present. No thyromegaly present.   Cardiovascular: Normal rate, regular rhythm, S1 normal, S2 normal, intact distal pulses and normal pulses.  Exam reveals no gallop and no friction rub.    Murmur heard.   Early systolic murmur is present with a grade of 2/6  at the lower left sternal border  Pulses:       Carotid pulses are 2+ on the right side, and 2+ on the left side.       Radial pulses are 2+ on the right side, and 2+ on the left side.   Pulmonary/Chest: Effort normal and breath sounds normal. No respiratory distress. She has no wheezes. She has no rales. She exhibits no tenderness.   Abdominal: Soft. Bowel sounds are normal. She exhibits no distension, no abdominal bruit and no pulsatile midline mass. There is no hepatosplenomegaly. There is no tenderness. There is no guarding.   Musculoskeletal: Normal range of motion. She exhibits no edema or tenderness.   Lymphadenopathy:   Palpation of lymph nodes of neck and groin normal   Neurological: She is alert and oriented to person, place, and time. No cranial nerve deficit. She exhibits normal muscle tone. Coordination normal.   Skin: Skin is warm and dry. No rash noted. No erythema. No pallor.   No " ankle and pretibial edema.   Psychiatric: She has a normal mood and affect. Her behavior is normal. Judgment and thought content normal.        Blood Tests:  Lab Results   Component Value Date    BNP 18 04/17/2015     04/20/2018    K 3.3 (L) 04/20/2018     04/20/2018    CO2 28 04/20/2018    BUN 16 04/20/2018    CREATININE 1.1 04/20/2018     (H) 04/20/2018    HGBA1C 6.3 (H) 01/14/2015    MG 2.2 02/16/2008    AST 19 04/20/2018    ALT 16 04/20/2018    ALBUMIN 3.8 04/20/2018    PROT 8.7 (H) 04/20/2018    BILITOT 0.4 04/20/2018    WBC 3.98 01/16/2018    HGB 10.6 (L) 01/16/2018    HCT 34.5 (L) 01/16/2018    MCV 79 (L) 01/16/2018     01/16/2018    INR 0.9 03/16/2015    TSH 3.767 03/16/2015       Lab Results   Component Value Date    CHOL 225 (H) 04/20/2018    HDL 38 (L) 04/20/2018    TRIG 198 (H) 04/20/2018       Lab Results   Component Value Date    LDLCALC 147.4 04/20/2018            ECG (19-OCT-2012)  Normal sinus rhythm  Possible Inferior infarct (cited on or before 19-OCT-2012)  Abnormal ECG  When compared with ECG of 24-OCT-2011 13:26,  T wave inversion more evident in Lateral leads           Echocardiogram (3/25/2015)    Aorta: The aortic root is normal in size, measuring 3.5 cm at sinotubular junction and 3.7 cm at Sinuses of Valsalva. The proximal ascending aorta is normal in size, measuring 3.8 cm across.     Left Atrium: The left atrial volume index is normal, measuring 26.54 cc/m2.     Left Ventricle: The left ventricle is normal in size, with an end-diastolic diameter of 4.8 cm, and an end-systolic diameter of 3.1 cm. LV wall thickness is normal, with the septum and the posterior wall each measuring 0.8 cm across. Relative wall   thickness was normal at 0.33, and the LV mass index was 82.3 g/m2 consistent with normal left ventricular mass. The following segments were hypokinetic: apical septum.  Global left ventricular systolic function appears normal. Visually estimated ejection  fraction is 60-65%. The LV Doppler derived stroke volume equals 57.0 ccs.   There is normal systolic/diastolic flow in the pulmonary vein indicating normal left atrial pressures. The E/e'(lat) is 13, consistent with diastolic dysfunction secondary to relaxation abnormality.     Right Atrium: The right atrium is normal in size, measuring 4.5 cm in length and 1.9 cm in width in the apical view.     Right Ventricle: The right ventricle is normal in size measuring 3.6 cm at the base in the apical right ventricle-focused view. Global right ventricular systolic function appears normal. Tricuspid annular plane systolic excursion (TAPSE) is 1.6 cm. The   estimated PA systolic pressure is 37 mmHg.     Aortic Valve:  The aortic valve is mildly sclerotic with normal leaflet mobility. The peak velocity obtained across the aortic valve is 1.7 m/s, which translates to a peak gradient of 12.0 mmHg. The mean gradient is 7.0 mmHg. Using a left ventricular   outflow tract diameter of 2.1 cm, a left ventricular outflow tract velocity time integral of 16.82 cm, and a peak instantaneous transvalvular velocity time integral of 26.5 cm, the calculated aortic valve area is 2.16 cm2. Additionally, there is trivial   aortic regurgitation.     Mitral Valve:  The mitral valve is mildly sclerotic. There is trivial mitral regurgitation.     Tricuspid Valve:  There is moderate tricuspid regurgitation. Tricuspid valve is normal in structure with normal leaflet mobility.     Pulmonary Valve:  Pulmonary valve is normal in structure with normal leaflet mobility.     IVC: IVC is normal in size and collapses > 50% with a sniff, suggesting normal right atrial pressure of 3 mmHg.     Intracavitary: There is no evidence of pericardial effusion, intracavity mass, thrombi, or vegetation.     CONCLUSIONS     1 - Normal left ventricular systolic function (EF 60-65%).     2 - Left ventricular diastolic dysfunction.     3 - Normal right ventricular systolic  function .     4 - Trivial aortic regurgitation.     5 - Trivial mitral regurgitation.     6 - Moderate tricuspid regurgitation.     This document has been electronically    SIGNED BY: Storm Oconnor MD           Segmental Pressures Low Extremities (3/25/2015)     Resting CAILIN:    The right ankle brachial index was 1.06 which is normal.   The left ankle brachial index was 1.04 which is normal.   The right TBI is 0.85.   The left TBI is 0.70.     Resting Segmental Pressures:    The right low thigh pressure was significantly reduced with a low thigh pressure / brachial pressure of 1.13 suggesting significant right iliofemoral disease.     Left high thigh cuff pressure is markedly elevated and unreliable.The left low thigh pressure was significantly reduced with a low thigh pressure / brachial pressure of 1.38 suggesting significant left iliofemoral disease. The left below knee pressure was significantly reduced suggesting significant left femoral-popliteal obstruction.     Waveform analysis are compatible with the above findings.     TEST DESCRIPTION   Resting segmental pressures were obtained.     This document has been electronically    SIGNED BY: Bharat Choudhary MD         Urine Tests:  No results found for: COLORU, APPEARANCEUA, PHUR, SPECGRAV, PROTEINUA, GLUCUA, KETONESU, BILIRUBINUA, OCCULTUA, NITRITE, UROBILINOGEN, LEUKOCYTESUR, PROTEINURINE, CREATRANDUR, UTPCR    I have reviewed the following:     Details / Date    []   Labs     []   Imaging     []   Cardiology Procedures     []   Other      Assessment and Plan:     1. PAD (peripheral artery disease)    2. Pure hypercholesterolemia    3. Essential hypertension         Hyperlipemia  LDL cholesterol is elevated in a pt with documented PAD  She tolerated neither statin nor ezetimibe therapy    Start pt on alirocumab 75 mg SQ every two weeks   Lipid panel in 3 months    Essential hypertension  Well controlled on present therapy. Hypokalemia (k 3.3). She prefers  not to be enrolled in Digital Hypertension     Continue presen therapy:  Amlodipine 5 mg qd  Losartan-HCTZ 100-25 mg qd    Start potassium supplement 20 mEq qd    PAD (peripheral artery disease)  She has symptoms suggestive of claudication (poor historian).    CAILIN at rest and with stress  Continue ASA 81 mg qd      Follow-up in about 6 weeks (around 6/4/2018).      Anmol Gan MD

## 2018-04-23 NOTE — ASSESSMENT & PLAN NOTE
LDL cholesterol is elevated in a pt with documented PAD  She tolerated neither statin nor ezetimibe therapy    Start pt on alirocumab 75 mg SQ every two weeks   Lipid panel in 3 months

## 2018-04-23 NOTE — ASSESSMENT & PLAN NOTE
She has symptoms suggestive of claudication (poor historian).    CAILIN at rest and with stress  Continue ASA 81 mg qd

## 2018-04-23 NOTE — TELEPHONE ENCOUNTER
Notified the patient we received the prescription for Praluent, and it will require authorization from the insurance company. Patient voiced understanding.

## 2018-04-25 DIAGNOSIS — R00.2 PALPITATIONS: Primary | ICD-10-CM

## 2018-04-25 NOTE — TELEPHONE ENCOUNTER
Documentation Only: Prior Authorization for Repatha has been approved for 6 months. Approval dates: 04/25/2018 through 10/25/2018 Patient co-pay: $510.08 Researching possible co-pay assistance. FROYLAN

## 2018-04-30 LAB — VASCULAR ANKLE BRACHIAL INDEX (ABI) RIGHT: 1 (ref 0.9–1.2)

## 2018-05-04 RX ORDER — AMLODIPINE BESYLATE 5 MG/1
5 TABLET ORAL DAILY
Qty: 90 TABLET | Refills: 3 | Status: SHIPPED | OUTPATIENT
Start: 2018-05-04 | End: 2019-04-30 | Stop reason: SDUPTHER

## 2018-05-28 NOTE — TELEPHONE ENCOUNTER
Patient returned call and requested that I apply for LIS on her behalf. She supplied necessary info for her and her spouse to complete jenny online. Informed her to keep watch for info to come in the mail pertinent to the application. Patient and spouse verbalized understanding.

## 2018-06-06 ENCOUNTER — HOSPITAL ENCOUNTER (EMERGENCY)
Facility: HOSPITAL | Age: 68
Discharge: HOME | End: 2018-06-06
Attending: EMERGENCY MEDICINE
Payer: MEDICARE

## 2018-06-06 VITALS
HEIGHT: 63 IN | BODY MASS INDEX: 30.12 KG/M2 | OXYGEN SATURATION: 97 % | RESPIRATION RATE: 20 BRPM | TEMPERATURE: 98 F | HEART RATE: 61 BPM | WEIGHT: 170 LBS | SYSTOLIC BLOOD PRESSURE: 125 MMHG | DIASTOLIC BLOOD PRESSURE: 84 MMHG

## 2018-06-06 DIAGNOSIS — M79.673 FOOT PAIN: ICD-10-CM

## 2018-06-06 PROCEDURE — 25000003 PHARM REV CODE 250: Performed by: EMERGENCY MEDICINE

## 2018-06-06 PROCEDURE — 99283 EMERGENCY DEPT VISIT LOW MDM: CPT | Mod: ,,, | Performed by: EMERGENCY MEDICINE

## 2018-06-06 PROCEDURE — 99283 EMERGENCY DEPT VISIT LOW MDM: CPT

## 2018-06-06 RX ORDER — HYDROCODONE BITARTRATE AND ACETAMINOPHEN 5; 325 MG/1; MG/1
1 TABLET ORAL
Status: COMPLETED | OUTPATIENT
Start: 2018-06-06 | End: 2018-06-06

## 2018-06-06 RX ADMIN — HYDROCODONE BITARTRATE AND ACETAMINOPHEN 1 TABLET: 5; 325 TABLET ORAL at 03:06

## 2018-06-06 NOTE — ED PROVIDER NOTES
"Encounter Date: 6/6/2018    SCRIBE #1 NOTE: I, Brianna Stern, am scribing for, and in the presence of,  Dr. Sanchez. I have scribed the entire note.       History     Chief Complaint   Patient presents with    Leg Pain     Patient reports right leg pain since 1500 yesterday. Patient states that she took a tramadol at home with no relief, states that she cannot bear weight. Patient reports history of multiple previous strokes to same side.      Time patient was seen by the provider: 3:07 AM      The patient is a 68 y.o. female with co-morbidities including HTN, HLD, ICH, anemia, and histories of breast cancer and stroke in 2011 who presents to the ED with a complaint of atraumatic right leg pain that started at 1500 yesterday. Patient is unable to bear weight and has taken a tramadol at 1900 with no relief. She also reports a history of multiple strokes to the same side. She states the pain is so severe and it is on the bottom and side of her foot. "Really everywhere on my foot." She could not sleep because it was so severe. She was watching tv when the pain started. She does not remember hitting or knocking her foot. Described the pain as burning and especially painful to touch. Never had this pain before. Pain is diffuse to the foot and tender all over her foot. He takes aspirin, but denies other blood thinners.       The history is provided by the patient and medical records.     Review of patient's allergies indicates:   Allergen Reactions    Pravastatin Other (See Comments)     Itching, elevated cpk, muscle aches     Past Medical History:   Diagnosis Date    Allergy     Anemia     Aortic aneurysm     Breast cancer 10/2011    left breast Stage 0 DCIS    Chronic diastolic congestive heart failure 11/6/2015    Colon polyp     GERD (gastroesophageal reflux disease)     History of colonic polyps     HX: breast cancer     Hyperlipemia     Hypertension     ICH (intracerebral hemorrhage)     Major " depressive disorder, single episode, mild 6/23/2016    Nuclear sclerosis 7/21/2014    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 7/21/2014    PAD (peripheral artery disease) 11/6/2015    Stroke 4/2011     Past Surgical History:   Procedure Laterality Date    APPENDECTOMY      BREAST BIOPSY Left 10/2011    BREAST LUMPECTOMY Left 2011    DCIS    COLONOSCOPY      CYST REMOVAL      back    ESOPHAGOGASTRODUODENOSCOPY  07/2016    duodenal angioectasia    FOOT FRACTURE SURGERY  10/2012    right foot, with R hallux valgus repair    FRACTURE SURGERY Right     broken toe repiar    HEMORRHOID SURGERY      LIVER BIOPSY      TUBAL LIGATION      UPPER GASTROINTESTINAL ENDOSCOPY       Family History   Problem Relation Age of Onset    No Known Problems Sister     Cancer Sister 63        Lung Cancer    No Known Problems Mother     Cancer Father     No Known Problems Brother     Hypertension Daughter     Fibroids Daughter         uterine    Hypertension Son     Breast cancer Sister 62    No Known Problems Brother     No Known Problems Maternal Aunt     No Known Problems Maternal Uncle     No Known Problems Paternal Aunt     No Known Problems Paternal Uncle     Hypertension Maternal Grandmother     No Known Problems Maternal Grandfather     No Known Problems Paternal Grandmother     No Known Problems Paternal Grandfather     Ovarian cancer Neg Hx     Colon cancer Neg Hx     Tremor Neg Hx     Amblyopia Neg Hx     Blindness Neg Hx     Cataracts Neg Hx     Diabetes Neg Hx     Glaucoma Neg Hx     Macular degeneration Neg Hx     Retinal detachment Neg Hx     Strabismus Neg Hx     Stroke Neg Hx     Thyroid disease Neg Hx     Esophageal cancer Neg Hx     Rectal cancer Neg Hx     Stomach cancer Neg Hx     Ulcerative colitis Neg Hx     Crohn's disease Neg Hx     Irritable bowel syndrome Neg Hx     Celiac disease Neg Hx      Social History   Substance Use Topics     Smoking status: Former Smoker     Packs/day: 0.50     Years: 20.00     Types: Cigarettes     Quit date: 4/2/2011    Smokeless tobacco: Former User     Quit date: 4/3/2011    Alcohol use Yes      Comment: on occasion - one glass wine every 3 months     Review of Systems   Constitutional: Negative for fever.   HENT: Negative for sore throat.    Respiratory: Negative for shortness of breath.    Cardiovascular: Negative for chest pain.   Gastrointestinal: Negative for abdominal pain.   Genitourinary: Negative for dysuria.   Musculoskeletal:        Right foot pain described as burning   Skin: Negative for rash and wound.   Neurological: Negative for syncope.   Psychiatric/Behavioral: Negative for confusion.       Physical Exam     Initial Vitals [06/06/18 0244]   BP Pulse Resp Temp SpO2   (!) 141/70 72 18 99.3 °F (37.4 °C) 95 %      MAP       93.67         Physical Exam    Vitals reviewed.  Constitutional: Vital signs are normal. She appears well-developed and well-nourished.   Appears uncomfortable secondary to pain   HENT:   Head: Normocephalic and atraumatic.   Mouth/Throat: Oropharynx is clear and moist.   Eyes: EOM are normal. Pupils are equal, round, and reactive to light. Right conjunctiva is not injected. Left conjunctiva is not injected. No scleral icterus.   Neck: Trachea normal and normal range of motion. Neck supple. No thyroid mass and no thyromegaly present. No tracheal tenderness and no muscular tenderness present. No tracheal deviation and no erythema present. No Brudzinski's sign and no Kernig's sign noted. No JVD present.   Cardiovascular: Normal rate and regular rhythm. Exam reveals no gallop and no friction rub.    No murmur heard.  Abdominal: Soft. Normal appearance and bowel sounds are normal. She exhibits no distension and no mass (No palpable masses). There is no tenderness.   Musculoskeletal:   Bilaterally equal feet. No obvious redness, swelling, deformity, or other abnormality. FROM. No  point tenderness. Equal DP and PT pulses.    Neurological: She is alert and oriented to person, place, and time.   Answers questions appropriately with fluid speech, moves all extremities nonfocally   Skin: Skin is warm and dry. Capillary refill takes less than 2 seconds. No lesion and no rash noted.         ED Course   Procedures  Labs Reviewed - No data to display          Medical Decision Making:   ED Management:  Pt here with foot pain concerning for strain or occult fx.  XR negative, improved with norco.  Do not suspect DVT given character of pain and location.  Vitals stable in ED.  Will d/c home and suggest RICE therapy. Advised pt to follow up with PCP or return if concerning symptoms arise. Pt understands and agrees with plan. Will d/c home.                          Clinical Impression:   The encounter diagnosis was Foot pain.    No orders to display                              Dawood Sanchez MD  06/06/18 0661

## 2018-06-06 NOTE — ED NOTES
Patient reports right leg pain since 1500 yesterday. Patient states that she took a tramadol at home with no relief, states that she cannot bear weight. Patient reports history of multiple previous strokes to same side. No swelling noted to right foot.     Appearance: Pt awake, alert & oriented to person, place & time. Pt in no acute distress at present time.   Skin: Skin warm, dry & intact. Mucous membranes moist. Skin turgor normal.   Respiratory: Respirations even, non-labored.   Neurologic: Pt moving all extremities without difficulty. Sensation intact.   Peripheral Vascular: All peripheral pulses present.

## 2018-06-07 ENCOUNTER — TELEPHONE (OUTPATIENT)
Dept: INTERNAL MEDICINE | Facility: CLINIC | Age: 68
End: 2018-06-07

## 2018-06-07 NOTE — TELEPHONE ENCOUNTER
----- Message from Kenya Vu sent at 6/7/2018  1:00 PM CDT -----  Contact: pt  Pt would like to be called back regarding an appt just released from hospital    Pt can be reached at [phone]

## 2018-06-20 ENCOUNTER — OFFICE VISIT (OUTPATIENT)
Dept: INTERNAL MEDICINE | Facility: CLINIC | Age: 68
End: 2018-06-20
Payer: MEDICARE

## 2018-06-20 VITALS
HEART RATE: 68 BPM | BODY MASS INDEX: 31.05 KG/M2 | SYSTOLIC BLOOD PRESSURE: 122 MMHG | WEIGHT: 175.25 LBS | DIASTOLIC BLOOD PRESSURE: 80 MMHG | OXYGEN SATURATION: 96 % | HEIGHT: 63 IN

## 2018-06-20 DIAGNOSIS — Z78.9 STATIN INTOLERANCE: ICD-10-CM

## 2018-06-20 DIAGNOSIS — E78.00 PURE HYPERCHOLESTEROLEMIA: ICD-10-CM

## 2018-06-20 DIAGNOSIS — I10 ESSENTIAL HYPERTENSION: ICD-10-CM

## 2018-06-20 DIAGNOSIS — M79.671 RIGHT FOOT PAIN: Primary | ICD-10-CM

## 2018-06-20 PROCEDURE — 99213 OFFICE O/P EST LOW 20 MIN: CPT | Mod: S$GLB,,, | Performed by: INTERNAL MEDICINE

## 2018-06-20 PROCEDURE — 3079F DIAST BP 80-89 MM HG: CPT | Mod: CPTII,S$GLB,, | Performed by: INTERNAL MEDICINE

## 2018-06-20 PROCEDURE — 3074F SYST BP LT 130 MM HG: CPT | Mod: CPTII,S$GLB,, | Performed by: INTERNAL MEDICINE

## 2018-06-20 PROCEDURE — 99499 UNLISTED E&M SERVICE: CPT | Mod: S$GLB,,, | Performed by: INTERNAL MEDICINE

## 2018-06-20 PROCEDURE — 99999 PR PBB SHADOW E&M-EST. PATIENT-LVL III: CPT | Mod: PBBFAC,,, | Performed by: INTERNAL MEDICINE

## 2018-06-20 NOTE — MEDICAL/APP STUDENT
"Subjective:       Patient ID: Shelby Thompson is a 68 y.o. female.    Chief Complaint: Hospital Follow Up    HPI     Shelby Thompson is a 68 y.o. F with an extensive PMHx including stroke, tremor, breast CA, anemia, osteopenia, HLD, and HTN who presents to clinic today for F/U of a recent ER visit due to R foot pain.  We also discussed her ongoing health issues.    1. R Foot pain: She [place] [facility] [place] c/o pain on the bottom and right of her feet. It was 8-9/10, burning, tender, woke her up, too painful to bear weight, not amenable to aspirin.  No masses or abnormalities were palpated, her pulses were equal and strong, and X-ray in the ED was normal so she was D/C'ed home with 1 [place] tablet.  She says that after taking the Norco at home, that the pain stopped and never came back.  She says she's been fine and has resumed her previous activity level.    2. HLD: She has still been having problems finding a medicine that's agreeable with her.  At her last visit we tried Zetia, but she only took it for 5 days and stopped due to headache.  Today she still is reluctant to try a statin.  Her diet and exercise level continue to be poor.    3. HTN: Well-controlled.  She complains the meds are making her fatigued, but says she'd "rather be tired than have high blood pressure."    Review of Systems   Constitutional: Positive for fatigue. Negative for activity change, appetite change and fever.   HENT: Negative for congestion, postnasal drip, rhinorrhea, sinus pain, sinus pressure, sneezing, sore throat and tinnitus.    Eyes: Negative for pain, redness, itching and visual disturbance.   Respiratory: Positive for cough. Negative for chest tightness, shortness of breath and wheezing.    Cardiovascular: Negative for chest pain and palpitations.   Gastrointestinal: Negative for abdominal pain, constipation, diarrhea, nausea and vomiting.   Genitourinary: Negative for difficulty urinating, dysuria, flank pain and " hematuria.   Musculoskeletal: Negative for arthralgias, back pain, joint swelling and myalgias.   Skin: Negative for color change, rash and wound.   Neurological: Negative for dizziness, seizures, weakness, numbness and headaches.   Hematological: Does not bruise/bleed easily.   Psychiatric/Behavioral: Negative for decreased concentration. The patient is not nervous/anxious.        Objective:      Physical Exam   Constitutional: She is oriented to person, place, and time. She appears well-developed and well-nourished. No distress.   HENT:   Head: Normocephalic and atraumatic.   Right Ear: External ear normal.   Left Ear: External ear normal.   Mouth/Throat: Oropharynx is clear and moist.   Eyes: Conjunctivae and EOM are normal. Pupils are equal, round, and reactive to light.   Neck: Normal range of motion. Neck supple. No thyromegaly present.   Cardiovascular: Normal rate, regular rhythm, normal heart sounds and intact distal pulses.    Pulses:       Dorsalis pedis pulses are 1+ on the right side, and 1+ on the left side.        Posterior tibial pulses are 1+ on the right side, and 1+ on the left side.   Pulmonary/Chest: Effort normal and breath sounds normal. She has no wheezes. She has no rales.   Abdominal: Soft. Bowel sounds are normal. She exhibits no distension and no mass. There is no tenderness. There is no guarding.   Musculoskeletal: Normal range of motion. She exhibits no edema or tenderness.        Right foot: There is normal range of motion and no deformity.        Left foot: There is normal range of motion and no deformity.   Feet:   Right Foot:   Skin Integrity: Positive for erythema and callus (1st MTP).   Left Foot:   Skin Integrity: Positive for erythema and callus (1st MTP).   Lymphadenopathy:     She has no cervical adenopathy.   Neurological: She is alert and oriented to person, place, and time. She displays tremor (R side). No cranial nerve deficit. She exhibits abnormal muscle tone (R side).    Skin: Skin is warm and dry. Capillary refill takes less than 2 seconds.   Psychiatric: She has a normal mood and affect. Her behavior is normal. Judgment and thought content normal.   Nursing note and vitals reviewed.      Assessment:       No diagnosis found.    Plan:       1. R Foot pain: No residual pain or concerning abnormality noted.  Pain on ED presentation likely 2/2 osteoarthritis due to her age.    2. HLD: Continue counseling her about the benefit of a statin.    3. HTN: C/w current plan.

## 2018-06-22 NOTE — PROGRESS NOTES
"Subjective:       Patient ID: Shelby Thompson is a 68 y.o. female.     Chief Complaint: Hospital Follow Up     HPI      Shelby Thompson is a 68 y.o. F with an extensive PMHx including stroke, tremor, breast CA, anemia, osteopenia, HLD, and HTN who presents to clinic today for F/U of a recent ER visit due to R foot pain.  We also discussed her ongoing health issues.     1. R Foot pain: She presented to [facility]'s ED on June 6 c/o pain on the bottom and right of her feet. It was 8-9/10, burning, tender, woke her up, too painful to bear weight, not amenable to aspirin.  No masses or abnormalities were palpated, her pulses were equal and strong, and X-ray in the ED was normal so she was D/C'ed home with 1 [place] tablet.  She says that after taking the Norco at home, that the pain stopped and never came back.  She says she's been fine and has resumed her previous activity level. No more pain or tenderness.       2. HLD: She has still been having problems finding a medicine that's agreeable with her.  At her last visit we tried Zetia, but she only took it for 5 days and stopped due to headache.  Today she still is reluctant to try a statin.  Her diet and exercise level continue to be poor.     3. HTN: Well-controlled.  She complains the meds are making her fatigued, but says she'd "rather be tired than have high blood pressure."     Review of Systems   Constitutional: Positive for fatigue. Negative for activity change, appetite change and fever.   HENT: Negative for congestion, postnasal drip, rhinorrhea, sinus pain, sinus pressure, sneezing, sore throat and tinnitus.    Eyes: Negative for pain, redness, itching and visual disturbance.   Respiratory: Positive for cough. Negative for chest tightness, shortness of breath and wheezing.    Cardiovascular: Negative for chest pain and palpitations.   Gastrointestinal: Negative for abdominal pain, constipation, diarrhea, nausea and vomiting.   Genitourinary: Negative for " difficulty urinating, dysuria, flank pain and hematuria.   Musculoskeletal: Negative for arthralgias, back pain, joint swelling and myalgias.   Skin: Negative for color change, rash and wound.   Neurological: Negative for dizziness, seizures, weakness, numbness and headaches.   Hematological: Does not bruise/bleed easily.   Psychiatric/Behavioral: Negative for decreased concentration. The patient is not nervous/anxious.        Objective:   Physical Exam   Constitutional: She is oriented to person, place, and time. She appears well-developed and well-nourished. No distress.   HENT:   Head: Normocephalic and atraumatic.   Right Ear: External ear normal.   Left Ear: External ear normal.   Mouth/Throat: Oropharynx is clear and moist.   Eyes: Conjunctivae and EOM are normal. Pupils are equal, round, and reactive to light.   Neck: Normal range of motion. Neck supple. No thyromegaly present.   Cardiovascular: Normal rate, regular rhythm, normal heart sounds and intact distal pulses.    Pulses:       Dorsalis pedis pulses are 1+ on the right side, and 1+ on the left side.        Posterior tibial pulses are 1+ on the right side, and 1+ on the left side.   Pulmonary/Chest: Effort normal and breath sounds normal. She has no wheezes. She has no rales.   Abdominal: Soft. Bowel sounds are normal. She exhibits no distension and no mass. There is no tenderness. There is no guarding.   Musculoskeletal: Normal range of motion. She exhibits no edema or tenderness.        Right foot: There is normal range of motion and no deformity.        Left foot: There is normal range of motion and no deformity.   Feet:   Right Foot:   No tenderness, or redness.      Lymphadenopathy:     She has no cervical adenopathy.   Neurological: She is alert and oriented to person, place, and time. She displays tremor (R side). No cranial nerve deficit. She exhibits abnormal muscle tone (R side).   Skin: Skin is warm and dry. Capillary refill takes less than  2 seconds.   Psychiatric: She has a normal mood and affect. Her behavior is normal. Judgment and thought content normal.   Nursing note and vitals reviewed.      Assessment:       1. Right foot pain- transient  2. Elevated Cholesterol   3. HTN--bp is well controled.    Plan:       1. R Foot pain: No residual pain or concerning abnormality noted.  Pain on ED presentation likely 2/2 osteoarthritis due to her age.     2. HLD: Continue counseling her about the benefit of a statin.     3. HTN: C/w current plan.

## 2018-07-23 ENCOUNTER — TELEPHONE (OUTPATIENT)
Dept: INTERNAL MEDICINE | Facility: CLINIC | Age: 68
End: 2018-07-23

## 2018-07-23 DIAGNOSIS — Z12.31 ENCOUNTER FOR SCREENING MAMMOGRAM FOR BREAST CANCER: Primary | ICD-10-CM

## 2018-07-23 NOTE — TELEPHONE ENCOUNTER
----- Message from Dea YUN Daniel sent at 7/23/2018  3:17 PM CDT -----  Contact: PT Portal Request  Appointment Request From: Shelby Thompson    With Provider: Emanuel Arevalo MD [-Primary Care Physician-]    Would Accept With:Only the person I've selected    Preferred Date Range: Any date 9/11/2018 or later    Preferred Times: Any    Reason for visit: Request an Appt    Comments:  Good afternoon Dr. Arevalo, I was told I need to go through you in order to make an appointment for my mammogram, which is due on 09/ 11/2018. I would  prefer to have that date if possible.

## 2018-07-25 ENCOUNTER — TELEPHONE (OUTPATIENT)
Dept: CARDIOLOGY | Facility: CLINIC | Age: 68
End: 2018-07-25

## 2018-07-25 NOTE — TELEPHONE ENCOUNTER
FOR DOCUMENTATION ONLY:  Financial Assistance for Praluent approved from 7/24/18 to 12/31/18  Source: [employer] Program  Phone: [phone]  Fax: [phone]

## 2018-07-25 NOTE — TELEPHONE ENCOUNTER
Roya Corea Staff; Anmol Gan MD             FYI: Financial Assistance for Praluent approved from 7/24/18 to 12/31/18 through [employer] Program.   I have left a message for the patient to notify her of the approval, and have her schedule a delivery of the medication.     Thank you,   Roya Alvarado   Patient Care Advocate   [facility]   Ph: [phone]

## 2018-07-25 NOTE — TELEPHONE ENCOUNTER
LVM to notify patient of Praluent assistance approval. She will call to schedule delivery and injection training if desired, and notify MD of start date so follow up labs may be scheduled.  Will make patient aware to call [employer] or MD if renewal assistance is needed.

## 2018-07-26 ENCOUNTER — PATIENT MESSAGE (OUTPATIENT)
Dept: CARDIOLOGY | Facility: CLINIC | Age: 68
End: 2018-07-26

## 2018-07-26 NOTE — TELEPHONE ENCOUNTER
Patient notified of Praluent assistance approval. She will call to schedule delivery and injection training if desired, and notify MD of start date so follow up labs may be scheduled. Made patient aware to call [employer] or MD if renewal assistance is needed. Patient verbalized understanding.

## 2018-07-31 NOTE — TELEPHONE ENCOUNTER
Patient called to report she had received her first delivery of Praluent from the [employer] assistance program.

## 2018-08-01 ENCOUNTER — PATIENT MESSAGE (OUTPATIENT)
Dept: CARDIOLOGY | Facility: CLINIC | Age: 68
End: 2018-08-01

## 2018-08-13 RX ORDER — LOSARTAN POTASSIUM AND HYDROCHLOROTHIAZIDE 25; 100 MG/1; MG/1
1 TABLET ORAL DAILY
Qty: 90 TABLET | Refills: 3 | Status: ON HOLD | OUTPATIENT
Start: 2018-08-13 | End: 2019-05-08 | Stop reason: SDUPTHER

## 2018-08-13 RX ORDER — OMEPRAZOLE 40 MG/1
40 CAPSULE, DELAYED RELEASE ORAL DAILY
Qty: 90 CAPSULE | Refills: 3 | Status: SHIPPED | OUTPATIENT
Start: 2018-08-13 | End: 2019-08-02 | Stop reason: SDUPTHER

## 2018-09-10 ENCOUNTER — OFFICE VISIT (OUTPATIENT)
Dept: CARDIOLOGY | Facility: CLINIC | Age: 68
End: 2018-09-10
Payer: MEDICARE

## 2018-09-10 ENCOUNTER — HOSPITAL ENCOUNTER (OUTPATIENT)
Dept: CARDIOLOGY | Facility: CLINIC | Age: 68
Discharge: HOME | End: 2018-09-10
Attending: INTERNAL MEDICINE
Payer: MEDICARE

## 2018-09-10 VITALS
HEIGHT: 64 IN | DIASTOLIC BLOOD PRESSURE: 78 MMHG | SYSTOLIC BLOOD PRESSURE: 134 MMHG | BODY MASS INDEX: 29.89 KG/M2 | WEIGHT: 175.06 LBS

## 2018-09-10 DIAGNOSIS — R06.09 DOE (DYSPNEA ON EXERTION): ICD-10-CM

## 2018-09-10 DIAGNOSIS — I73.9 PAD (PERIPHERAL ARTERY DISEASE): ICD-10-CM

## 2018-09-10 DIAGNOSIS — I07.1 MODERATE TRICUSPID REGURGITATION: ICD-10-CM

## 2018-09-10 DIAGNOSIS — E78.00 PURE HYPERCHOLESTEROLEMIA: ICD-10-CM

## 2018-09-10 DIAGNOSIS — E78.5 HYPERLIPIDEMIA, UNSPECIFIED HYPERLIPIDEMIA TYPE: ICD-10-CM

## 2018-09-10 DIAGNOSIS — I10 ESSENTIAL HYPERTENSION: ICD-10-CM

## 2018-09-10 DIAGNOSIS — I35.1 MILD AORTIC REGURGITATION: ICD-10-CM

## 2018-09-10 DIAGNOSIS — Z78.9 STATIN INTOLERANCE: ICD-10-CM

## 2018-09-10 DIAGNOSIS — R06.09 DOE (DYSPNEA ON EXERTION): Primary | ICD-10-CM

## 2018-09-10 PROCEDURE — 3078F DIAST BP <80 MM HG: CPT | Mod: CPTII,,, | Performed by: INTERNAL MEDICINE

## 2018-09-10 PROCEDURE — 1101F PT FALLS ASSESS-DOCD LE1/YR: CPT | Mod: CPTII,,, | Performed by: INTERNAL MEDICINE

## 2018-09-10 PROCEDURE — 99999 PR PBB SHADOW E&M-EST. PATIENT-LVL III: CPT | Mod: PBBFAC,,, | Performed by: INTERNAL MEDICINE

## 2018-09-10 PROCEDURE — 99214 OFFICE O/P EST MOD 30 MIN: CPT | Mod: S$PBB,,, | Performed by: INTERNAL MEDICINE

## 2018-09-10 PROCEDURE — 99499 UNLISTED E&M SERVICE: CPT | Mod: S$GLB,,, | Performed by: INTERNAL MEDICINE

## 2018-09-10 PROCEDURE — 93306 TTE W/DOPPLER COMPLETE: CPT | Mod: PBBFAC | Performed by: INTERNAL MEDICINE

## 2018-09-10 PROCEDURE — 3075F SYST BP GE 130 - 139MM HG: CPT | Mod: CPTII,,, | Performed by: INTERNAL MEDICINE

## 2018-09-10 PROCEDURE — 99213 OFFICE O/P EST LOW 20 MIN: CPT | Mod: PBBFAC | Performed by: INTERNAL MEDICINE

## 2018-09-10 NOTE — PROGRESS NOTES
Subjective:   Patient ID:  Shelby Thompson is a 68 y.o. female who presents for follow-up of PAD (peripheral artery disease) (3 months fu) and Pure hypercholesterolemia  hypertension; hyperlipidemia; statin intolerance; PAD; diastolic dysfunction by echo; s/p CVA with residual deficit; COPD, BMI 30.54  Echo 2015    1 - Normal left ventricular systolic function (EF 60-65%).     2 - Left ventricular diastolic dysfunction.     3 - Normal right ventricular systolic function .     4 - Trivial aortic regurgitation.     5 - Trivial mitral regurgitation.     6 - Moderate tricuspid regurgitation.     Segmental Pressures Low Extremities (3/25/2015)  Resting CAILIN:  The right ankle brachial index was 1.06 which is normal.   The left ankle brachial index was 1.04 which is normal.   The right TBI is 0.85.   The left TBI is 0.70.   Resting Segmental Pressures:  The right low thigh pressure was significantly reduced with a low thigh pressure / brachial pressure of 1.13 suggesting significant right iliofemoral disease.   Left high thigh cuff pressure is markedly elevated and unreliable.The left low thigh pressure was significantly reduced with a low thigh pressure / brachial pressure of 1.38 suggesting significant left iliofemoral disease. The left below knee pressure was significantly reduced suggesting significant left femoral-popliteal obstruction.   Waveform analysis are compatible with the above findings.     HPI:   No chest pain, Orthopnea, PND of heart failure symptoms.   Denies palpitations or fluttering in the chest  Rt. Sided weaknesses due to residual CVA. Does not report claudication.   Patient feels more SOB on minimal activity.     Patient Active Problem List   Diagnosis    Personal history of malignant neoplasm of breast    Mononeuritis of upper limb    Iron deficiency anemia    Hyperlipemia    Gastropathy    Tremor    Nuclear sclerosis    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  "   Thoracic aortic aneurysm without rupture    Aortic atherosclerosis    History of CVA with residual deficit    Osteopenia    PAD (peripheral artery disease)    Left ventricular diastolic dysfunction with preserved systolic function    Liver mass    Essential hypertension    Recurrent major depressive disorder, in full remission    Statin intolerance    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)     /78 (BP Location: Left arm, Patient Position: Sitting, BP Method: Large (Automatic))   Ht 5' 4" (1.626 m)   Wt 79.4 kg (175 lb 0.7 oz)   LMP  (LMP Unknown)   BMI 30.05 kg/m²   Body mass index is 30.05 kg/m².  Estimated Creatinine Clearance: 49.2 mL/min (based on SCr of 1.1 mg/dL).    Lab Results   Component Value Date     09/11/2018    K 3.7 09/11/2018     09/11/2018    CO2 27 09/11/2018    BUN 17 09/11/2018    CREATININE 1.1 09/11/2018     (H) 09/11/2018    HGBA1C 6.3 (H) 01/14/2015    MG 2.2 02/16/2008    AST 17 09/11/2018    ALT 14 09/11/2018    ALBUMIN 3.9 09/11/2018    PROT 8.8 (H) 09/11/2018    BILITOT 0.4 09/11/2018    WBC 3.98 01/16/2018    HGB 10.6 (L) 01/16/2018    HCT 34.5 (L) 01/16/2018    MCV 79 (L) 01/16/2018     01/16/2018    INR 0.9 03/16/2015    TSH 3.767 03/16/2015    CHOL 150 09/11/2018    HDL 43 09/11/2018    LDLCALC 79.6 09/11/2018    TRIG 137 09/11/2018       Current Outpatient Medications   Medication Sig    alirocumab (PRALUENT PEN) 75 mg/mL PnIj INJECT 75MG UNDER THE SKIN EVERY TWO WEEKS    amLODIPine (NORVASC) 5 MG tablet Take 1 tablet (5 mg total) by mouth once daily.    aspirin 81 mg Tab Take 1 tablet by mouth Daily.    bisacodyl (DULCOLAX) 5 mg EC tablet Take 5 mg by mouth once daily.    ferrous gluconate 324 mg (37.5 mg iron) Tab TAKE 1 TABLET BY MOUTH 2 (TWO) TIMES DAILY WITH MEALS.    furosemide (LASIX) 20 MG tablet Take 1 tablet (20 mg total) by mouth once daily.    gabapentin (NEURONTIN) 300 MG capsule TAKE 1 CAPSULE " (300 MG TOTAL) BY MOUTH 2 (TWO) TIMES DAILY.    losartan-hydrochlorothiazide 100-25 mg (HYZAAR) 100-25 mg per tablet TAKE 1 TABLET BY MOUTH ONCE DAILY.    metoprolol succinate (TOPROL-XL) 25 MG 24 hr tablet Take 1 tablet (25 mg total) by mouth once daily.    omeprazole (PRILOSEC) 40 MG capsule TAKE 1 CAPSULE (40 MG TOTAL) BY MOUTH ONCE DAILY.    sertraline (ZOLOFT) 50 MG tablet TAKE 1 TABLET (50 MG TOTAL) BY MOUTH ONCE DAILY.    tramadol (ULTRAM) 50 mg tablet Take 1 tablet (50 mg total) by mouth 2 (two) times daily as needed for Pain.    vitamin D 1000 units Tab Take 185 mg by mouth once daily.     No current facility-administered medications for this visit.        Review of Systems   Constitution: Negative for decreased appetite, diaphoresis, fever, weakness, malaise/fatigue, weight gain and weight loss.   HENT: Negative for congestion, ear discharge, ear pain and nosebleeds.    Eyes: Negative for blurred vision, double vision and visual disturbance.   Cardiovascular: Positive for leg swelling (mild swelling at the end of the day). Negative for chest pain, claudication, cyanosis, dyspnea on exertion, irregular heartbeat, near-syncope, orthopnea, palpitations, paroxysmal nocturnal dyspnea and syncope.   Respiratory: Negative for cough, hemoptysis, shortness of breath, sleep disturbances due to breathing, snoring, sputum production and wheezing.    Endocrine: Negative for polydipsia, polyphagia and polyuria.   Hematologic/Lymphatic: Negative for adenopathy and bleeding problem. Does not bruise/bleed easily.   Skin: Negative for color change, nail changes, poor wound healing and rash.   Musculoskeletal: Negative for muscle cramps and muscle weakness.   Gastrointestinal: Negative for abdominal pain, anorexia, change in bowel habit, heartburn, hematochezia, nausea and vomiting.   Genitourinary: Negative for dysuria, frequency and hematuria.   Neurological: Negative for brief paralysis, difficulty with  concentration, excessive daytime sleepiness, dizziness, focal weakness, headaches, light-headedness, seizures and vertigo.   Psychiatric/Behavioral: Negative for altered mental status and depression.   Allergic/Immunologic: Negative for persistent infections.       Objective:   Physical Exam   Constitutional: She is oriented to person, place, and time. She appears well-developed and well-nourished. No distress.   Rt sided weakness   HENT:   Head: Normocephalic and atraumatic.   Nose: Nose normal.   Mouth/Throat: Oropharynx is clear and moist. No oropharyngeal exudate.   Eyes: Conjunctivae and EOM are normal. Pupils are equal, round, and reactive to light. Right eye exhibits no discharge. Left eye exhibits no discharge. No scleral icterus.   Neck: Normal range of motion. Neck supple. No JVD present. No tracheal deviation present. No thyromegaly present.   Cardiovascular: Normal rate, regular rhythm, normal heart sounds and intact distal pulses. Exam reveals no gallop and no friction rub.   No murmur heard.  Pulmonary/Chest: Effort normal and breath sounds normal. No stridor. No respiratory distress. She has no wheezes. She has no rales. She exhibits no tenderness.   Abdominal: Soft. Bowel sounds are normal. She exhibits no distension and no mass. There is no tenderness. There is no rebound and no guarding.   Musculoskeletal: Normal range of motion. She exhibits no edema or tenderness.   Lymphadenopathy:     She has no cervical adenopathy.   Neurological: She is alert and oriented to person, place, and time. She has normal reflexes. No cranial nerve deficit. She exhibits normal muscle tone. Coordination normal.   Skin: Skin is warm. No rash noted. She is not diaphoretic. No erythema. No pallor.   Psychiatric: She has a normal mood and affect. Her behavior is normal. Judgment and thought content normal.       Assessment:     1. ACEVEDO (dyspnea on exertion)    2. PAD (peripheral artery disease)    3. Pure  hypercholesterolemia    4. Essential hypertension    5. Statin intolerance    6. Hyperlipidemia, unspecified hyperlipidemia type    7. Moderate tricuspid regurgitation    8. Mild aortic regurgitation        Plan:   Shelby was seen today for pad (peripheral artery disease) and pure hypercholesterolemia.    Diagnoses and all orders for this visit:    ACEVEDO (dyspnea on exertion)  -     2D Echo w/ Color Flow Doppler; Future    PAD (peripheral artery disease)    Pure hypercholesterolemia  -     Lipid panel; Future  -     Comprehensive metabolic panel; Future    Essential hypertension    Statin intolerance    Hyperlipidemia, unspecified hyperlipidemia type  -     Lipid panel; Future  -     Comprehensive metabolic panel; Future    Moderate tricuspid regurgitation    Mild aortic regurgitation    Low salt diet and watch weight call if there is 3-5 pounds weight gain in 1 wk.   Counseled on importance of heart healthy diet low in saturated and trans fat and salt as well gradually starting a regular aerobic exercise regimen with goal of 30min 5x/week. Recommend BP diary. Call if systolic BP > 130 mmHg on checking repeatedly      RTC 6-8 wks.

## 2018-09-11 ENCOUNTER — HOSPITAL ENCOUNTER (OUTPATIENT)
Dept: RADIOLOGY | Facility: HOSPITAL | Age: 68
Discharge: HOME | End: 2018-09-11
Attending: INTERNAL MEDICINE
Payer: MEDICARE

## 2018-09-11 DIAGNOSIS — Z12.31 ENCOUNTER FOR SCREENING MAMMOGRAM FOR BREAST CANCER: ICD-10-CM

## 2018-09-11 LAB
DIASTOLIC DYSFUNCTION: YES
ESTIMATED PA SYSTOLIC PRESSURE: 40.95
MITRAL VALVE REGURGITATION: ABNORMAL
RETIRED EF AND QEF - SEE NOTES: 55 (ref 55–65)
TRICUSPID VALVE REGURGITATION: ABNORMAL

## 2018-09-11 PROCEDURE — 77067 SCR MAMMO BI INCL CAD: CPT | Mod: TC

## 2018-09-11 PROCEDURE — 77063 BREAST TOMOSYNTHESIS BI: CPT | Mod: 26,,, | Performed by: RADIOLOGY

## 2018-09-11 PROCEDURE — 77067 SCR MAMMO BI INCL CAD: CPT | Mod: 26,,, | Performed by: RADIOLOGY

## 2018-09-12 ENCOUNTER — TELEPHONE (OUTPATIENT)
Dept: CARDIOLOGY | Facility: CLINIC | Age: 68
End: 2018-09-12

## 2018-09-12 RX ORDER — METOPROLOL SUCCINATE 25 MG/1
25 TABLET, EXTENDED RELEASE ORAL DAILY
Qty: 90 TABLET | Refills: 3 | Status: SHIPPED | OUTPATIENT
Start: 2018-09-12 | End: 2018-11-05

## 2018-09-12 RX ORDER — FUROSEMIDE 20 MG/1
20 TABLET ORAL DAILY
Qty: 90 TABLET | Refills: 3 | Status: SHIPPED | OUTPATIENT
Start: 2018-09-12 | End: 2018-11-05

## 2018-09-12 NOTE — TELEPHONE ENCOUNTER
Echo shows that pressures inside the heart are high hence we should start you on a low dose diuretic lasix 20 mg once a datyy to see if it improves your SOB. As well as low dose MTP 25 mg once a day and we can reevaluate this on follow up. meds sent to the pharmacy.

## 2018-09-12 NOTE — TELEPHONE ENCOUNTER
----- Message from Luz Elena Stratton MD sent at 9/12/2018  8:49 AM CDT -----  Cholesterol level is normal. No changes in medications. Continue the praluent injection,. This will protect you from future strokes.

## 2018-10-01 RX ORDER — GABAPENTIN 300 MG/1
300 CAPSULE ORAL 2 TIMES DAILY
Qty: 180 CAPSULE | Refills: 3 | Status: SHIPPED | OUTPATIENT
Start: 2018-10-01 | End: 2019-10-05 | Stop reason: SDUPTHER

## 2018-11-05 ENCOUNTER — OFFICE VISIT (OUTPATIENT)
Dept: CARDIOLOGY | Facility: CLINIC | Age: 68
End: 2018-11-05
Payer: MEDICARE

## 2018-11-05 VITALS
BODY MASS INDEX: 31.29 KG/M2 | HEIGHT: 63 IN | OXYGEN SATURATION: 94 % | HEART RATE: 68 BPM | SYSTOLIC BLOOD PRESSURE: 134 MMHG | WEIGHT: 176.56 LBS | DIASTOLIC BLOOD PRESSURE: 76 MMHG

## 2018-11-05 DIAGNOSIS — I73.9 CLAUDICATION: Primary | ICD-10-CM

## 2018-11-05 DIAGNOSIS — Z78.9 STATIN INTOLERANCE: ICD-10-CM

## 2018-11-05 DIAGNOSIS — I35.1 MILD AORTIC REGURGITATION: ICD-10-CM

## 2018-11-05 DIAGNOSIS — R06.09 DOE (DYSPNEA ON EXERTION): ICD-10-CM

## 2018-11-05 DIAGNOSIS — I73.9 PAD (PERIPHERAL ARTERY DISEASE): ICD-10-CM

## 2018-11-05 DIAGNOSIS — I07.1 MODERATE TRICUSPID REGURGITATION: ICD-10-CM

## 2018-11-05 DIAGNOSIS — I10 ESSENTIAL HYPERTENSION: ICD-10-CM

## 2018-11-05 PROCEDURE — 1101F PT FALLS ASSESS-DOCD LE1/YR: CPT | Mod: CPTII,S$GLB,, | Performed by: INTERNAL MEDICINE

## 2018-11-05 PROCEDURE — 99499 UNLISTED E&M SERVICE: CPT | Mod: S$GLB,,, | Performed by: INTERNAL MEDICINE

## 2018-11-05 PROCEDURE — 3078F DIAST BP <80 MM HG: CPT | Mod: CPTII,S$GLB,, | Performed by: INTERNAL MEDICINE

## 2018-11-05 PROCEDURE — 3075F SYST BP GE 130 - 139MM HG: CPT | Mod: CPTII,S$GLB,, | Performed by: INTERNAL MEDICINE

## 2018-11-05 PROCEDURE — 99214 OFFICE O/P EST MOD 30 MIN: CPT | Mod: S$GLB,,, | Performed by: INTERNAL MEDICINE

## 2018-11-05 PROCEDURE — 99999 PR PBB SHADOW E&M-EST. PATIENT-LVL III: CPT | Mod: PBBFAC,,, | Performed by: INTERNAL MEDICINE

## 2018-11-05 NOTE — PROGRESS NOTES
Subjective:   Patient ID:  Shelby Thompson is a 68 y.o. female who presents for follow-up of ACEVEDO (dyspnea on exertion) (8 weeks fu); Fatigue; and Headache  Shelby Thompson is a 68 y.o. female who presents for follow-up of PAD (peripheral artery disease) (3 months fu) and Pure hypercholesterolemia  hypertension; hyperlipidemia; statin intolerance; PAD; diastolic dysfunction by echo; s/p CVA with residual deficit; COPD, BMI 30.54  Echo 2015    1 - Normal left ventricular systolic function (EF 60-65%).     2 - Left ventricular diastolic dysfunction.     3 - Normal right ventricular systolic function .     4 - Trivial aortic regurgitation.     5 - Trivial mitral regurgitation.     6 - Moderate tricuspid regurgitation.      Segmental Pressures Low Extremities (3/25/2015)  Resting CAILIN:  The right ankle brachial index was 1.06 which is normal.   The left ankle brachial index was 1.04 which is normal.   The right TBI is 0.85.   The left TBI is 0.70.   Resting Segmental Pressures:  The right low thigh pressure was significantly reduced with a low thigh pressure / brachial pressure of 1.13 suggesting significant right iliofemoral disease.   Left high thigh cuff pressure is markedly elevated and unreliable.The left low thigh pressure was significantly reduced with a low thigh pressure / brachial pressure of 1.38 suggesting significant left iliofemoral disease. The left below knee pressure was significantly reduced suggesting significant left femoral-popliteal obstruction.   Waveform analysis are compatible with the above findings.      HPI:   No chest pain, Orthopnea, PND of heart failure symptoms. Occasional chest pressure that is transient.   Denies palpitations or fluttering in the chest. Palpitations are transient.   Rt. Sided weaknesses due to residual CVA. Does not report claudication.   Patient feels more SOB on minimal activity.   Can hear herself wheezing when she takes a bath.       Patient Active Problem List  "  Diagnosis    Personal history of malignant neoplasm of breast    Mononeuritis of upper limb    Iron deficiency anemia    Hyperlipemia    Gastropathy    Tremor    Nuclear sclerosis    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    Thoracic aortic aneurysm without rupture    Aortic atherosclerosis    History of CVA with residual deficit    Osteopenia    PAD (peripheral artery disease)    Left ventricular diastolic dysfunction with preserved systolic function    Liver mass    Essential hypertension    Recurrent major depressive disorder, in full remission    Statin intolerance    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  CrCl cannot be calculated (Patient's most recent lab result is older than the maximum 7 days allowed.).    Lab Results   Component Value Date     09/11/2018    K 3.7 09/11/2018     09/11/2018    CO2 27 09/11/2018    BUN 17 09/11/2018    CREATININE 1.1 09/11/2018     (H) 09/11/2018    HGBA1C 6.3 (H) 01/14/2015    MG 2.2 02/16/2008    AST 17 09/11/2018    ALT 14 09/11/2018    ALBUMIN 3.9 09/11/2018    PROT 8.8 (H) 09/11/2018    BILITOT 0.4 09/11/2018    WBC 3.98 01/16/2018    HGB 10.6 (L) 01/16/2018    HCT 34.5 (L) 01/16/2018    MCV 79 (L) 01/16/2018     01/16/2018    INR 0.9 03/16/2015    TSH 3.767 03/16/2015    CHOL 150 09/11/2018    HDL 43 09/11/2018    LDLCALC 79.6 09/11/2018    TRIG 137 09/11/2018       Current Outpatient Medications   Medication Sig    alirocumab (PRALUENT PEN) 75 mg/mL PnIj INJECT 75MG UNDER THE SKIN EVERY TWO WEEKS    amLODIPine (NORVASC) 5 MG tablet Take 1 tablet (5 mg total) by mouth once daily.    aspirin 81 mg Tab Take 1 tablet by mouth " Daily.    bisacodyl (DULCOLAX) 5 mg EC tablet Take 5 mg by mouth once daily.    ferrous gluconate 324 mg (37.5 mg iron) Tab TAKE 1 TABLET BY MOUTH 2 (TWO) TIMES DAILY WITH MEALS.    gabapentin (NEURONTIN) 300 MG capsule TAKE 1 CAPSULE (300 MG TOTAL) BY MOUTH 2 (TWO) TIMES DAILY.    losartan-hydrochlorothiazide 100-25 mg (HYZAAR) 100-25 mg per tablet TAKE 1 TABLET BY MOUTH ONCE DAILY.    omeprazole (PRILOSEC) 40 MG capsule TAKE 1 CAPSULE (40 MG TOTAL) BY MOUTH ONCE DAILY.    sertraline (ZOLOFT) 50 MG tablet TAKE 1 TABLET (50 MG TOTAL) BY MOUTH ONCE DAILY.    tramadol (ULTRAM) 50 mg tablet Take 1 tablet (50 mg total) by mouth 2 (two) times daily as needed for Pain.    vitamin D 1000 units Tab Take 185 mg by mouth once daily.     No current facility-administered medications for this visit.        Review of Systems   Constitution: Negative for decreased appetite, diaphoresis, fever, weakness, malaise/fatigue, weight gain and weight loss.   HENT: Negative for congestion, ear discharge, ear pain and nosebleeds.    Eyes: Negative for blurred vision, double vision and visual disturbance.   Cardiovascular: Positive for leg swelling (mild swelling at the end of the day). Negative for chest pain, claudication, cyanosis, dyspnea on exertion, irregular heartbeat, near-syncope, orthopnea, palpitations, paroxysmal nocturnal dyspnea and syncope.   Respiratory: Negative for cough, hemoptysis, shortness of breath, sleep disturbances due to breathing, snoring, sputum production and wheezing.    Endocrine: Negative for polydipsia, polyphagia and polyuria.   Hematologic/Lymphatic: Negative for adenopathy and bleeding problem. Does not bruise/bleed easily.   Skin: Negative for color change, nail changes, poor wound healing and rash.   Musculoskeletal: Negative for muscle cramps and muscle weakness.   Gastrointestinal: Negative for abdominal pain, anorexia, change in bowel habit, heartburn, hematochezia, nausea and vomiting.    Genitourinary: Negative for dysuria, frequency and hematuria.   Neurological: Negative for brief paralysis, difficulty with concentration, excessive daytime sleepiness, dizziness, focal weakness, headaches, light-headedness, seizures and vertigo.   Psychiatric/Behavioral: Negative for altered mental status and depression.   Allergic/Immunologic: Negative for persistent infections.       Objective:   Physical Exam   Constitutional: She is oriented to person, place, and time. She appears well-developed and well-nourished. No distress.   Rt sided weakness   HENT:   Head: Normocephalic and atraumatic.   Nose: Nose normal.   Mouth/Throat: Oropharynx is clear and moist. No oropharyngeal exudate.   Eyes: Conjunctivae and EOM are normal. Pupils are equal, round, and reactive to light. Right eye exhibits no discharge. Left eye exhibits no discharge. No scleral icterus.   Neck: Normal range of motion. Neck supple. No JVD present. No tracheal deviation present. No thyromegaly present.   Cardiovascular: Normal rate, regular rhythm, normal heart sounds and intact distal pulses. Exam reveals no gallop and no friction rub.   No murmur heard.  Pulmonary/Chest: Effort normal and breath sounds normal. No stridor. No respiratory distress. She has no wheezes. She has no rales. She exhibits no tenderness.   Abdominal: Soft. Bowel sounds are normal. She exhibits no distension and no mass. There is no tenderness. There is no rebound and no guarding.   Musculoskeletal: Normal range of motion. She exhibits no edema or tenderness.   Lymphadenopathy:     She has no cervical adenopathy.   Neurological: She is alert and oriented to person, place, and time. She has normal reflexes. No cranial nerve deficit. She exhibits normal muscle tone. Coordination normal.   Skin: Skin is warm. No rash noted. She is not diaphoretic. No erythema. No pallor.   Psychiatric: She has a normal mood and affect. Her behavior is normal. Judgment and thought content  normal.       Assessment:     1. Claudication    2. ACEVEDO (dyspnea on exertion)    3. PAD (peripheral artery disease)    4. Essential hypertension    5. Mild aortic regurgitation    6. Moderate tricuspid regurgitation    7. Statin intolerance        Plan:   Shelby was seen today for acevedo (dyspnea on exertion), fatigue and headache.    Diagnoses and all orders for this visit:    Claudication  -     Exercise CAILIN (Cupid Only); Future    ACEVEDO (dyspnea on exertion)    PAD (peripheral artery disease)    Essential hypertension    Mild aortic regurgitation    Moderate tricuspid regurgitation    Statin intolerance      Will recheck CAILIN, no signs of heart failure continue the meds as prescribed.   Counseled on importance of heart healthy diet low in saturated and trans fat and salt as well gradually starting a regular aerobic exercise regimen with goal of 30min 5x/week. Recommend BP diary. Call if systolic BP > 130 mmHg on checking repeatedly.    RTC 8 months or sooner if SOB worsens

## 2018-11-12 ENCOUNTER — CLINICAL SUPPORT (OUTPATIENT)
Dept: CARDIOLOGY | Facility: CLINIC | Age: 68
End: 2018-11-12
Attending: INTERNAL MEDICINE
Payer: MEDICARE

## 2018-11-12 DIAGNOSIS — I73.9 CLAUDICATION: ICD-10-CM

## 2018-11-12 PROCEDURE — 93924 LWR XTR VASC STDY BILAT: CPT | Mod: S$GLB,,, | Performed by: INTERNAL MEDICINE

## 2018-11-13 LAB
IMMEDIATE ARM BP: 134 MMHG
IMMEDIATE LEFT ABI: 1.03
IMMEDIATE LEFT TIBIAL: 138 MMHG
IMMEDIATE RIGHT ABI: 1.03
IMMEDIATE RIGHT TIBIAL: 138 MMHG
LEFT ABI: 1.1
LEFT ARM BP: 128 MMHG
LEFT DORSALIS PEDIS: 142 MMHG
LEFT POSTERIOR TIBIAL: 147 MMHG
RIGHT ABI: 1.03
RIGHT ARM BP: 134 MMHG
RIGHT DORSALIS PEDIS: 114 MMHG
RIGHT POSTERIOR TIBIAL: 138 MMHG
TREADMILL GRADE: 12 %
TREADMILL SPEED: 2 MPH
TREADMILL TIME: 2 MIN

## 2018-11-15 ENCOUNTER — TELEPHONE (OUTPATIENT)
Dept: CARDIOLOGY | Facility: CLINIC | Age: 68
End: 2018-11-15

## 2018-12-21 ENCOUNTER — TELEPHONE (OUTPATIENT)
Dept: CARDIOLOGY | Facility: CLINIC | Age: 68
End: 2018-12-21

## 2018-12-21 NOTE — TELEPHONE ENCOUNTER
----- Message from Lucretia Lujan sent at 12/21/2018 10:47 AM CST -----  Contact: pt   Pt says she need her application renewed for her Praluent Pen 75 mg/ml Pnlj. She says it need to go to [facility] [phone] (pt assistant support)  LOV 11/5/18 Dr. Stratton    Thanks

## 2019-02-06 ENCOUNTER — TELEPHONE (OUTPATIENT)
Dept: PHARMACY | Facility: CLINIC | Age: 69
End: 2019-02-06

## 2019-02-07 NOTE — TELEPHONE ENCOUNTER
Patient notified of Praluent assistance approval. She will call to schedule delivery. Made patient aware to call [facility] or MD if renewal assistance is needed. Patient verbalized understanding.

## 2019-02-07 NOTE — TELEPHONE ENCOUNTER
FOR DOCUMENTATION ONLY:  Financial Assistance for Praluent approved from 2/4/19 to 12/31/19  Source: PASS Program  Case ID: [id number]  Phone: [phone]  Fax: [phone]

## 2019-03-19 ENCOUNTER — PATIENT OUTREACH (OUTPATIENT)
Dept: ADMINISTRATIVE | Facility: HOSPITAL | Age: 69
End: 2019-03-19

## 2019-03-27 ENCOUNTER — OFFICE VISIT (OUTPATIENT)
Dept: INTERNAL MEDICINE | Facility: CLINIC | Age: 69
End: 2019-03-27
Payer: MEDICARE

## 2019-03-27 VITALS
HEIGHT: 63 IN | HEART RATE: 79 BPM | SYSTOLIC BLOOD PRESSURE: 120 MMHG | DIASTOLIC BLOOD PRESSURE: 84 MMHG | OXYGEN SATURATION: 95 % | BODY MASS INDEX: 31.45 KG/M2 | WEIGHT: 177.5 LBS

## 2019-03-27 DIAGNOSIS — I73.9 PAD (PERIPHERAL ARTERY DISEASE): ICD-10-CM

## 2019-03-27 DIAGNOSIS — R19.00 ABDOMINAL SWELLING: ICD-10-CM

## 2019-03-27 DIAGNOSIS — I69.30 HISTORY OF CVA WITH RESIDUAL DEFICIT: ICD-10-CM

## 2019-03-27 DIAGNOSIS — R53.83 FATIGUE, UNSPECIFIED TYPE: ICD-10-CM

## 2019-03-27 DIAGNOSIS — I10 ESSENTIAL HYPERTENSION: ICD-10-CM

## 2019-03-27 DIAGNOSIS — R73.09 ELEVATED GLUCOSE: ICD-10-CM

## 2019-03-27 DIAGNOSIS — F33.42 RECURRENT MAJOR DEPRESSIVE DISORDER, IN FULL REMISSION: ICD-10-CM

## 2019-03-27 DIAGNOSIS — Z85.3 PERSONAL HISTORY OF MALIGNANT NEOPLASM OF BREAST: ICD-10-CM

## 2019-03-27 DIAGNOSIS — Z78.9 STATIN INTOLERANCE: ICD-10-CM

## 2019-03-27 DIAGNOSIS — D50.8 OTHER IRON DEFICIENCY ANEMIA: ICD-10-CM

## 2019-03-27 DIAGNOSIS — I70.0 AORTIC ATHEROSCLEROSIS: Primary | ICD-10-CM

## 2019-03-27 DIAGNOSIS — R16.0 LIVER MASS: ICD-10-CM

## 2019-03-27 PROCEDURE — 3079F DIAST BP 80-89 MM HG: CPT | Mod: HCNC,CPTII,S$GLB, | Performed by: INTERNAL MEDICINE

## 2019-03-27 PROCEDURE — 99499 UNLISTED E&M SERVICE: CPT | Mod: HCNC,S$GLB,, | Performed by: INTERNAL MEDICINE

## 2019-03-27 PROCEDURE — 99215 OFFICE O/P EST HI 40 MIN: CPT | Mod: HCNC,S$GLB,, | Performed by: INTERNAL MEDICINE

## 2019-03-27 PROCEDURE — 1101F PT FALLS ASSESS-DOCD LE1/YR: CPT | Mod: HCNC,CPTII,S$GLB, | Performed by: INTERNAL MEDICINE

## 2019-03-27 PROCEDURE — 99215 PR OFFICE/OUTPT VISIT, EST, LEVL V, 40-54 MIN: ICD-10-PCS | Mod: HCNC,S$GLB,, | Performed by: INTERNAL MEDICINE

## 2019-03-27 PROCEDURE — 3074F SYST BP LT 130 MM HG: CPT | Mod: HCNC,CPTII,S$GLB, | Performed by: INTERNAL MEDICINE

## 2019-03-27 PROCEDURE — 3079F PR MOST RECENT DIASTOLIC BLOOD PRESSURE 80-89 MM HG: ICD-10-PCS | Mod: HCNC,CPTII,S$GLB, | Performed by: INTERNAL MEDICINE

## 2019-03-27 PROCEDURE — 1101F PR PT FALLS ASSESS DOC 0-1 FALLS W/OUT INJ PAST YR: ICD-10-PCS | Mod: HCNC,CPTII,S$GLB, | Performed by: INTERNAL MEDICINE

## 2019-03-27 PROCEDURE — 99499 RISK ADDL DX/OHS AUDIT: ICD-10-PCS | Mod: HCNC,S$GLB,, | Performed by: INTERNAL MEDICINE

## 2019-03-27 PROCEDURE — 3074F PR MOST RECENT SYSTOLIC BLOOD PRESSURE < 130 MM HG: ICD-10-PCS | Mod: HCNC,CPTII,S$GLB, | Performed by: INTERNAL MEDICINE

## 2019-03-27 PROCEDURE — 99999 PR PBB SHADOW E&M-EST. PATIENT-LVL III: ICD-10-PCS | Mod: PBBFAC,HCNC,, | Performed by: INTERNAL MEDICINE

## 2019-03-27 PROCEDURE — 99999 PR PBB SHADOW E&M-EST. PATIENT-LVL III: CPT | Mod: PBBFAC,HCNC,, | Performed by: INTERNAL MEDICINE

## 2019-03-27 NOTE — PROGRESS NOTES
Shelby Thompson is a 69 y.o. F with an extensive PMHx including stroke, tremor, breast CA, anemia, osteopenia, HLD, and HTN who presents to clinic today for F/U  Her medical problems.    She is a 67-year-old female coming in today to follow up her ongoing medical   Problems.        1. She has a history of CVA affecting her right side in 2001. Has a mild   residual right-sided hemiparesis. The weakness is better- SHe still can't write.  SHe is using a walker- Rolator.        2. Hyperlipidemia.  She is intolerant to statins because of   rhabdo, liver enzymes have resolved. She was seen and stated on alirocomab- and she is tolerating it well.    still elevated at 150 with HDL 43, LDL 79.6 . She is working on low-fat,   low-cholesterol diet.         3.  She has history of anemia, recent H and H has come up to   10.6 / 34  (1/2018 )  She is taking iron   replacement. She had a colonoscopy in 2014 for colon polyps, EGD in 2012, and video capsule in Sept 2016: Gastritis.                        - Multiple angioectasias without bleeding in the                         proximal small bowel.                        - A single angioectasia without bleeding in the                         ileum.  Gi recommended following up with H/H and continuing iron.  she does have some fatigue .   C-scope showed she had 1 polyp in 2015 and it was hyperplastic and she is due for a repeat in 2025.       4. She has a tremor on her right   side that she was seen by Neurology back in 2012 for this .   Walker.  She does not think the amantidine  So it was stopped last time-- she has not noticed a difference .          5. She has a history of breast cancer, following up with regular   mammograms. Most recent mammogram was in September 2018. She had lumpectomy in 2011.        6. She had a liver mass that we recently got MRI follow up 5/16, which showed a   benign appearing 1 cm lesion in the VI area of the liver, which seems to be    Hemangioma.      7.  She continues to have some pain in her right arm secondary to her   stroke post-residual from a stroke. She was taking tramadol- but this was stopped and she is taking tylenol for this.          8. She has a history of some depression and anxiety for which she is on Zoloft.   She says this has been doing well too. Otherwise, she denies any problems.       9. [place]--  SHe was seen in NOv 2018.  That note was reviewed--She had a echo 9/2018 showing :      1 - Normal left ventricular systolic function (EF 55-60%).     2 - Wall motion abnormalities.     3 - Mildly enlarged ascending aorta.     4 - Impaired LV relaxation, elevated LAP (grade 2 diastolic dysfunction).     5 - Normal right ventricular systolic function .     6 - Mild mitral regurgitation.     7 - Mild to moderate tricuspid regurgitation.     8 - Pulmonary hypertension. The estimated PA systolic pressure is 41 mmHg.      Review of Systems   Constitutional: Positive for fatigue. Negative for activity change, appetite change and fever.   HENT: Negative for congestion, postnasal drip, rhinorrhea, sinus pain, sinus pressure, sneezing, sore throat and tinnitus.    Eyes: Negative for pain, redness, itching and visual disturbance.   Respiratory: . Negative for chest tightness, shortness of breath and wheezing.    Cardiovascular: Negative for chest pain and palpitations.   Gastrointestinal: Negative for abdominal pain, constipation, diarrhea, nausea and vomiting. She does complain of abdominal swelling in last 3 months    Genitourinary: Negative for difficulty urinating, dysuria, flank pain and hematuria.   Musculoskeletal: Negative for arthralgias, back pain, joint swelling and myalgias.   Skin: Negative for color change, rash and wound.   Neurological: Negative for dizziness, seizures, weakness, numbness and headaches.   Hematological: Does not bruise/bleed easily.   Psychiatric/Behavioral: Negative for decreased concentration. The patient is  not nervous/anxious.       Objective:   Physical Exam   Constitutional: She is oriented to person, place, and time. She appears well-developed and well-nourished. No distress.   HENT:   Head: Normocephalic and atraumatic.   Right Ear: External ear normal.   Left Ear: External ear normal.   Mouth/Throat: Oropharynx is clear and moist.   Eyes: Conjunctivae and EOM are normal. Pupils are equal, round, and reactive to light.   Neck: Normal range of motion. Neck supple. No thyromegaly present.   Cardiovascular: Normal rate, regular rhythm, normal heart sounds and intact distal pulses.    Pulses:       Dorsalis pedis pulses are 1+ on the right side, and 1+ on the left side.        Posterior tibial pulses are 1+ on the right side, and 1+ on the left side.   Pulmonary/Chest: Effort normal and breath sounds normal. She has no wheezes. She has no rales.   Abdominal: Distended -- Bowel sounds are normal. She exhibits no  masses. There is no tenderness. There is no guarding.   Musculoskeletal: Normal range of motion. She exhibits no edema or tenderness.        Right foot: There is normal range of motion and no deformity.        Left foot: There is normal range of motion and no deformity.   Feet:   Right Foot:   No tenderness, or redness.      Lymphadenopathy:     She has no cervical adenopathy.   Neurological: She is alert and oriented to person, place, and time. No tremor noted today  No cranial nerve deficit. She exhibits abnormal muscle tone (R side)- upper and lower extremites.    Skin: Skin is warm and dry. Capillary refill takes less than 2 seconds.   Psychiatric: She has a normal mood and affect. Her behavior is normal. Judgment and thought content normal.   Nursing note and vitals reviewed.     Assessment:      ASSESSMENT: Hypertension, iron deficiency anemia, hyperlipidemia, cannot   tolerate statins due to rhabdo_ on Alirocomab, history of cerebral infarction, tremor now,   history of elevated liver enzymes that  resolved, history of CVA, ACEVEDO, Claudication  and the liver   mass, which we reviewed already.     Continue current medications-   Recheck CBC   Recheck CMP and Hgb A1c- ( last several glucoses were high.   ) due to her c/o of abdominal swelling and history of liver mass- reviewed biopsy today)- will repeat liver US>

## 2019-03-29 ENCOUNTER — OFFICE VISIT (OUTPATIENT)
Dept: INTERNAL MEDICINE | Facility: CLINIC | Age: 69
End: 2019-03-29
Payer: MEDICARE

## 2019-03-29 VITALS
WEIGHT: 178.81 LBS | SYSTOLIC BLOOD PRESSURE: 120 MMHG | DIASTOLIC BLOOD PRESSURE: 80 MMHG | OXYGEN SATURATION: 95 % | BODY MASS INDEX: 31.68 KG/M2 | HEART RATE: 80 BPM | HEIGHT: 63 IN

## 2019-03-29 DIAGNOSIS — I73.9 PAD (PERIPHERAL ARTERY DISEASE): ICD-10-CM

## 2019-03-29 DIAGNOSIS — I70.0 AORTIC ATHEROSCLEROSIS: ICD-10-CM

## 2019-03-29 DIAGNOSIS — I77.1 TORTUOUS AORTA: ICD-10-CM

## 2019-03-29 DIAGNOSIS — F33.42 RECURRENT MAJOR DEPRESSIVE DISORDER, IN FULL REMISSION: ICD-10-CM

## 2019-03-29 DIAGNOSIS — I77.819 ECTATIC AORTA: ICD-10-CM

## 2019-03-29 DIAGNOSIS — H40.013 OPEN ANGLE WITH BORDERLINE FINDINGS AND LOW GLAUCOMA RISK IN BOTH EYES: ICD-10-CM

## 2019-03-29 DIAGNOSIS — I10 ESSENTIAL HYPERTENSION: ICD-10-CM

## 2019-03-29 DIAGNOSIS — H25.13 NUCLEAR SCLEROSIS OF BOTH EYES: ICD-10-CM

## 2019-03-29 DIAGNOSIS — I51.89 LEFT VENTRICULAR DIASTOLIC DYSFUNCTION WITH PRESERVED SYSTOLIC FUNCTION: ICD-10-CM

## 2019-03-29 DIAGNOSIS — Z00.00 ENCOUNTER FOR PREVENTIVE HEALTH EXAMINATION: Primary | ICD-10-CM

## 2019-03-29 DIAGNOSIS — I27.9 PULMONARY HEART DISEASE: ICD-10-CM

## 2019-03-29 DIAGNOSIS — R25.1 TREMOR: ICD-10-CM

## 2019-03-29 DIAGNOSIS — E78.00 PURE HYPERCHOLESTEROLEMIA: ICD-10-CM

## 2019-03-29 DIAGNOSIS — I69.322 DYSARTHRIA AS LATE EFFECT OF CEREBROVASCULAR ACCIDENT (CVA): ICD-10-CM

## 2019-03-29 DIAGNOSIS — I69.30 HISTORY OF CVA WITH RESIDUAL DEFICIT: ICD-10-CM

## 2019-03-29 DIAGNOSIS — M85.80 OSTEOPENIA, UNSPECIFIED LOCATION: ICD-10-CM

## 2019-03-29 DIAGNOSIS — Z85.3 PERSONAL HISTORY OF MALIGNANT NEOPLASM OF BREAST: ICD-10-CM

## 2019-03-29 DIAGNOSIS — I71.20 THORACIC AORTIC ANEURYSM WITHOUT RUPTURE: ICD-10-CM

## 2019-03-29 PROCEDURE — 3074F PR MOST RECENT SYSTOLIC BLOOD PRESSURE < 130 MM HG: ICD-10-PCS | Mod: HCNC,CPTII,S$GLB, | Performed by: NURSE PRACTITIONER

## 2019-03-29 PROCEDURE — G0439 PPPS, SUBSEQ VISIT: HCPCS | Mod: HCNC,S$GLB,, | Performed by: NURSE PRACTITIONER

## 2019-03-29 PROCEDURE — 99499 RISK ADDL DX/OHS AUDIT: ICD-10-PCS | Mod: HCNC,S$GLB,, | Performed by: NURSE PRACTITIONER

## 2019-03-29 PROCEDURE — G0439 PR MEDICARE ANNUAL WELLNESS SUBSEQUENT VISIT: ICD-10-PCS | Mod: HCNC,S$GLB,, | Performed by: NURSE PRACTITIONER

## 2019-03-29 PROCEDURE — 99999 PR PBB SHADOW E&M-EST. PATIENT-LVL IV: CPT | Mod: PBBFAC,HCNC,, | Performed by: NURSE PRACTITIONER

## 2019-03-29 PROCEDURE — 3079F PR MOST RECENT DIASTOLIC BLOOD PRESSURE 80-89 MM HG: ICD-10-PCS | Mod: HCNC,CPTII,S$GLB, | Performed by: NURSE PRACTITIONER

## 2019-03-29 PROCEDURE — 3079F DIAST BP 80-89 MM HG: CPT | Mod: HCNC,CPTII,S$GLB, | Performed by: NURSE PRACTITIONER

## 2019-03-29 PROCEDURE — 99999 PR PBB SHADOW E&M-EST. PATIENT-LVL IV: ICD-10-PCS | Mod: PBBFAC,HCNC,, | Performed by: NURSE PRACTITIONER

## 2019-03-29 PROCEDURE — 99499 UNLISTED E&M SERVICE: CPT | Mod: HCNC,S$GLB,, | Performed by: NURSE PRACTITIONER

## 2019-03-29 PROCEDURE — 3074F SYST BP LT 130 MM HG: CPT | Mod: HCNC,CPTII,S$GLB, | Performed by: NURSE PRACTITIONER

## 2019-03-29 NOTE — PATIENT INSTRUCTIONS
Counseling and Referral of Other Preventative  (Italic type indicates deductible and co-insurance are waived)    Patient Name: Shelby Thompson  Today's Date: 3/29/2019    Health Maintenance       Date Due Completion Date    Eye Exam 02/13/2018 2/13/2017    Foot Exam 06/20/2019 6/20/2018    Mammogram 09/11/2019 9/11/2018    Lipid Panel 09/11/2019 9/11/2018    Hemoglobin A1c 09/27/2019 3/27/2019    DEXA SCAN 01/11/2021 1/11/2017    Colonoscopy 03/26/2025 3/26/2015    TETANUS VACCINE 06/23/2026 6/23/2016    Override on 6/23/2016: Done        No orders of the defined types were placed in this encounter.    The following information is provided to all patients.  This information is to help you find resources for any of the problems found today that may be affecting your health:                Living healthy guide: www.[facility].[place].[facility]      Understanding Diabetes: www.diabetes.org      Eating healthy: www.cdc.gov/healthyweight      [facility] home safety checklist: www.cdc.gov/steadi/patient.html      Agency on Aging: www.goea.[place].[facility]      Alcoholics anonymous (AA): www.aa.org      Physical Activity: www.joseline.nih.gov/sj8pasp      Tobacco use: www.quitwithusla.org

## 2019-03-29 NOTE — PROGRESS NOTES
"Shelby Thompson presented for a  Medicare AWV and comprehensive Health Risk Assessment today. The following components were reviewed and updated:    · Medical history  · Family History  · Social history  · Allergies and Current Medications  · Health Risk Assessment  · Health Maintenance  · Care Team     ** See Completed Assessments for Annual Wellness Visit within the encounter summary.**       The following assessments were completed:  · Living Situation  · CAGE  · Depression Screening  · Timed Get Up and Go  · Whisper Test  · Cognitive Function Screening*unable due to tremor  · Nutrition Screening  · ADL Screening  · PAQ Screening    Vitals:    03/29/19 0909   BP: 120/80   Pulse: 80   SpO2: 95%   Weight: 81.1 kg (178 lb 12.7 oz)   Height: 5' 3" (1.6 m)     Body mass index is 31.67 kg/m².  Physical Exam   Constitutional: She is oriented to person, place, and time. She appears well-developed.   obese   HENT:   Head: Normocephalic and atraumatic.   Nose: Nose normal.   Eyes: Conjunctivae and EOM are normal.   Cardiovascular: Normal rate, regular rhythm, normal heart sounds and intact distal pulses.   No murmur heard.  Pulmonary/Chest: Effort normal and breath sounds normal.   Neurological: She is alert and oriented to person, place, and time. She displays tremor. Gait abnormal.   Wheeled walker   Skin: Skin is warm and dry.   Psychiatric: She has a normal mood and affect. Her behavior is normal. Judgment and thought content normal.   Nursing note and vitals reviewed.        Diagnoses and health risks identified today and associated recommendations/orders:    1. Encounter for preventive health examination  Assessment performed. Health maintenance updated. Chart review completed.    2. Aortic atherosclerosis  Noted on imaging. Stable with blood pressure control and Praluent therapy. Followed by PCP.    3. PAD (peripheral artery disease)  Chronic. Continue current regimen as instructed. Followed by Cardiology.    4. " Tortuous aorta  Noted on imaging. Stable with blood pressure control and Praluent therapy. Followed by PCP.    5. Pulmonary heart disease  Chronic. Continue current regimen as instructed. Followed by Cardiology.    6. Ectatic aorta  Noted on imaging. Stable with blood pressure control and Praluent therapy. Followed by PCP.    7. Thoracic aortic aneurysm without rupture  Chronic. No need for repair per Vascular surgery note 2015. Blood pressure control advised. Repeat imaging in one year.  Abdominal u/s scheduled for next week.    8. Recurrent major depressive disorder, in full remission  Chronic. Stable with current regimen. Followed by PCP.    9. Essential hypertension  Chronic. Stable with current regimen. Followed by PCP.    10. Pure hypercholesterolemia  Chronic. Stable with current regimen. Followed by PCP.    11. Left ventricular diastolic dysfunction with preserved systolic function  Chronic. Continue current regimen as instructed. Followed by Cardiology.    12. Personal history of malignant neoplasm of breast  Stable.  Mammogram screening up to date.  Followed by Breast Surgery.    13. Osteopenia, unspecified location  Chronic. Stable with current regimen. Followed by PCP.    14. 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 Stable. Followed by PCP.  Last Neurology visit 2014.     15. History of CVA with residual deficit  Chronic. Stable. Followed by PCP.  Last Neurology visit 2014.    16. Tremor  Chronic. Stable. Followed by PCP.  Last Neurology visit 2014.    17. Nuclear sclerosis of both eyes  Chronic. Stable. Continue current regimen. Followed by Ophthalmology.  Due for eye exam, patient will schedule.    18. 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  Chronic. Stable. Continue current regimen. Followed by Ophthalmology.  Due for eye exam, patient will schedule.      Onelia Ryan with a 5-10 year written screening schedule and personal prevention  plan. Recommendations were developed using the USPSTF age appropriate recommendations. Education, counseling, and referrals were provided as needed. After Visit Summary printed and given to patient which includes a list of additional screenings\tests needed.    Follow up for Annual Wellness Visit in 1 year, follow up with PCP as instructed, ;sooner if problems.    GALA Rodgers

## 2019-04-04 ENCOUNTER — HOSPITAL ENCOUNTER (OUTPATIENT)
Dept: RADIOLOGY | Facility: HOSPITAL | Age: 69
Discharge: HOME | End: 2019-04-04
Attending: INTERNAL MEDICINE
Payer: MEDICARE

## 2019-04-04 DIAGNOSIS — R16.0 LIVER MASS: ICD-10-CM

## 2019-04-04 PROCEDURE — 76700 US ABDOMEN COMPLETE: ICD-10-PCS | Mod: 26,HCNC,, | Performed by: RADIOLOGY

## 2019-04-04 PROCEDURE — 76700 US EXAM ABDOM COMPLETE: CPT | Mod: 26,HCNC,, | Performed by: RADIOLOGY

## 2019-04-04 PROCEDURE — 76700 US EXAM ABDOM COMPLETE: CPT | Mod: TC,HCNC

## 2019-04-06 ENCOUNTER — TELEPHONE (OUTPATIENT)
Dept: INTERNAL MEDICINE | Facility: CLINIC | Age: 69
End: 2019-04-06

## 2019-04-06 DIAGNOSIS — R16.0 LIVER MASS: Primary | ICD-10-CM

## 2019-04-06 NOTE — TELEPHONE ENCOUNTER
Please call-- She more liver masses than in 2016 and we need to get a ct scan this week.  I spoke with her Saturday.

## 2019-04-10 ENCOUNTER — HOSPITAL ENCOUNTER (OUTPATIENT)
Dept: RADIOLOGY | Facility: HOSPITAL | Age: 69
Discharge: HOME | End: 2019-04-10
Attending: INTERNAL MEDICINE
Payer: MEDICARE

## 2019-04-10 DIAGNOSIS — R16.0 LIVER MASS: ICD-10-CM

## 2019-04-10 PROCEDURE — 74177 CT ABD & PELVIS W/CONTRAST: CPT | Mod: 26,HCNC,, | Performed by: RADIOLOGY

## 2019-04-10 PROCEDURE — 74177 CT ABD & PELVIS W/CONTRAST: CPT | Mod: TC,HCNC

## 2019-04-10 PROCEDURE — 25500020 PHARM REV CODE 255: Mod: HCNC | Performed by: INTERNAL MEDICINE

## 2019-04-10 PROCEDURE — 74177 CT ABDOMEN PELVIS WITH CONTRAST: ICD-10-PCS | Mod: 26,HCNC,, | Performed by: RADIOLOGY

## 2019-04-10 RX ADMIN — IOHEXOL 100 ML: 350 INJECTION, SOLUTION INTRAVENOUS at 02:04

## 2019-04-11 ENCOUNTER — TELEPHONE (OUTPATIENT)
Dept: INTERNAL MEDICINE | Facility: CLINIC | Age: 69
End: 2019-04-11

## 2019-04-11 DIAGNOSIS — R93.89 THICKENED ENDOMETRIUM: ICD-10-CM

## 2019-04-11 DIAGNOSIS — R16.0 LIVER MASSES: Primary | ICD-10-CM

## 2019-04-11 RX ORDER — TRAMADOL HYDROCHLORIDE 50 MG/1
50 TABLET ORAL 2 TIMES DAILY PRN
Qty: 60 TABLET | Refills: 3 | Status: SHIPPED | OUTPATIENT
Start: 2019-04-11 | End: 2020-06-04 | Stop reason: SDUPTHER

## 2019-04-11 NOTE — TELEPHONE ENCOUNTER
I saw call her and spoke to by phone.        Her CT scan showed several findings.  She has a lot of spots in the liver fallen seen pretty small.  She also has a spot in the spleen.  There is no mention of any enlarged lymph nodes.  It also showed some thickening of the endometrium.  She had her mammogram July 2018 and last colonoscopy was reviewed.  His back in March of 2015.  She had a hyperplastic polyp at that time.  She is recommended for another colonoscopy in 2025.  She was seen back in 2155 elevated liver enzymes and liver mass that time she had liver biopsy.  My plan is to make a referral to gyn to evaluate the thickening of the endometrium and also make a referral to hepatology for the liver lesions.    Please get him to gyn and hepatology Clinic within next 2 weeks also make her follow-up me in 1 month.

## 2019-04-11 NOTE — TELEPHONE ENCOUNTER
Ob appointment scheduled Hepatology they'll call pt to scheduled.   Appointment in 1 month scheduled.

## 2019-04-12 ENCOUNTER — DOCUMENTATION ONLY (OUTPATIENT)
Dept: TRANSPLANT | Facility: CLINIC | Age: 69
End: 2019-04-12

## 2019-04-12 ENCOUNTER — TELEPHONE (OUTPATIENT)
Dept: INTERNAL MEDICINE | Facility: CLINIC | Age: 69
End: 2019-04-12

## 2019-04-12 DIAGNOSIS — R16.0 LIVER MASS: Primary | ICD-10-CM

## 2019-04-12 NOTE — LETTER
April 12, 2019    Shelby Thompson  [address] [facility] [address]      Dear Shelby Thompson:    Your doctor has referred you to the [facility]. We are sending this letter to remind you to make an appointment with us to complete the referral process.     Please call us at [phone] to schedule an appointment. We look forward to seeing you soon.     If you received a call and have been scheduled, please disregard this letter.       Sincerely,        [facility]   [facility] [place], [place] [id number] [phone]

## 2019-04-12 NOTE — NURSING
Pt records reviewed.   Pt will be referred to Hepatology.  Liver masses, additional imaging needed  Initial referral received  from the [facility]que.   Referring Provider/diagnosis  Emanuel Arevalo Jr., MD    Referral letter sent to patient.

## 2019-04-12 NOTE — TELEPHONE ENCOUNTER
----- Message from Brooke Pederson LPN sent at 4/12/2019  3:04 PM CDT -----  Dr Van,          Patient will need to have the recommended MRI prior to her appointment being scheduled in hepatology.  Please inform us of this appointment.   Thank you for your assistance in the care of this patient.    Alysa

## 2019-04-22 ENCOUNTER — OFFICE VISIT (OUTPATIENT)
Dept: OBSTETRICS AND GYNECOLOGY | Facility: CLINIC | Age: 69
End: 2019-04-22
Payer: MEDICARE

## 2019-04-22 VITALS
BODY MASS INDEX: 31.25 KG/M2 | HEIGHT: 63 IN | DIASTOLIC BLOOD PRESSURE: 70 MMHG | WEIGHT: 176.38 LBS | SYSTOLIC BLOOD PRESSURE: 110 MMHG

## 2019-04-22 DIAGNOSIS — R93.89 THICKENED ENDOMETRIUM: Primary | ICD-10-CM

## 2019-04-22 PROCEDURE — 1101F PR PT FALLS ASSESS DOC 0-1 FALLS W/OUT INJ PAST YR: ICD-10-PCS | Mod: HCNC,CPTII,S$GLB, | Performed by: OBSTETRICS & GYNECOLOGY

## 2019-04-22 PROCEDURE — 3074F PR MOST RECENT SYSTOLIC BLOOD PRESSURE < 130 MM HG: ICD-10-PCS | Mod: HCNC,CPTII,S$GLB, | Performed by: OBSTETRICS & GYNECOLOGY

## 2019-04-22 PROCEDURE — 99203 PR OFFICE/OUTPT VISIT, NEW, LEVL III, 30-44 MIN: ICD-10-PCS | Mod: HCNC,S$GLB,, | Performed by: OBSTETRICS & GYNECOLOGY

## 2019-04-22 PROCEDURE — 99999 PR PBB SHADOW E&M-EST. PATIENT-LVL II: ICD-10-PCS | Mod: PBBFAC,HCNC,, | Performed by: OBSTETRICS & GYNECOLOGY

## 2019-04-22 PROCEDURE — 99999 PR PBB SHADOW E&M-EST. PATIENT-LVL II: CPT | Mod: PBBFAC,HCNC,, | Performed by: OBSTETRICS & GYNECOLOGY

## 2019-04-22 PROCEDURE — 3078F PR MOST RECENT DIASTOLIC BLOOD PRESSURE < 80 MM HG: ICD-10-PCS | Mod: HCNC,CPTII,S$GLB, | Performed by: OBSTETRICS & GYNECOLOGY

## 2019-04-22 PROCEDURE — 3074F SYST BP LT 130 MM HG: CPT | Mod: HCNC,CPTII,S$GLB, | Performed by: OBSTETRICS & GYNECOLOGY

## 2019-04-22 PROCEDURE — 99203 OFFICE O/P NEW LOW 30 MIN: CPT | Mod: HCNC,S$GLB,, | Performed by: OBSTETRICS & GYNECOLOGY

## 2019-04-22 PROCEDURE — 1101F PT FALLS ASSESS-DOCD LE1/YR: CPT | Mod: HCNC,CPTII,S$GLB, | Performed by: OBSTETRICS & GYNECOLOGY

## 2019-04-22 PROCEDURE — 3078F DIAST BP <80 MM HG: CPT | Mod: HCNC,CPTII,S$GLB, | Performed by: OBSTETRICS & GYNECOLOGY

## 2019-04-22 NOTE — PROGRESS NOTES
Gynecology    SUBJECTIVE:     Chief Complaint: consult (uterine mass)       History of Present Illness:  69 year old female with PMH including stroke, tremor, breast CA, anemia, osteopenia, HLD, and HTN who presents for follow up from internal medicine for an incidental finding of a thickened endometrial stripe found on CT scan.    Reproductive organs: Thickened endometrium.  Enlarged uterus with scattered calcifications and heterogeneous enhancement, findings could represent underlying fibroids.  Patient has no complaints of bleeding, pain/pressure, unexpected weightloss, FH of uterine cancer.  LMP was about 50 years ago.  She has a history of breast cancer, but did not receive tamoxifen.      Review of Systems:  Review of Systems   Constitutional: Negative for appetite change, fever and unexpected weight change.   Respiratory: Negative for shortness of breath.    Cardiovascular: Negative for chest pain.   Gastrointestinal: Negative for nausea and vomiting.   Genitourinary: Negative for pelvic pain, vaginal bleeding, vaginal discharge, vaginal pain and postmenopausal bleeding.        OBJECTIVE:     Physical Exam:  Physical Exam   Constitutional: She is oriented to person, place, and time. She appears well-developed and well-nourished.   Pulmonary/Chest: Effort normal.   Abdominal: Soft.   Genitourinary: Vagina normal and uterus normal. No labial fusion. There is no rash, tenderness, lesion or injury on the right labia. There is no rash, tenderness, lesion or injury on the left labia. Uterus is not deviated, not enlarged, not fixed and not tender. Cervix exhibits no motion tenderness, no discharge and no friability. Right adnexum displays no mass, no tenderness and no fullness. Left adnexum displays no mass, no tenderness and no fullness. No erythema, tenderness or bleeding in the vagina. No foreign body in the vagina. No signs of injury around the vagina. No vaginal discharge found.   Genitourinary Comments:  Urethra: normal appearing urethra with no masses, tenderness or lesions  Urethral meatus: normal size, anterior vaginal wall with no prolapse, no lesions  Uterus: size difficult to palpate due to body habitus; however, no masses felt;   Cervix: no abnormal lesions   Neurological: She is alert and oriented to person, place, and time.   Psychiatric: She has a normal mood and affect. Her behavior is normal. Judgment and thought content normal.   Nursing note and vitals reviewed.      Chaperoned by: Sana    ASSESSMENT:       ICD-10-CM ICD-9-CM    1. Thickened endometrium R93.89 793.5           Plan:      Shelby was seen today for consult.    Diagnoses and all orders for this visit:    Thickened endometrium    - thickened endometrium- incidental finding on CT scan  - reviewed low risk for uterine cancer given no signs/symptoms of bleeding, weightloss, pain, etc.  - reviewed ACOG  regarding TVUS in evaluation for PMB in reference to incidental finding of thickened endometrium on TVUS; studies do not warrant further work up if no symptoms at this time  - advised patient to return if she develops bleeding, discharge/ odor, pain, etc.    No orders of the defined types were placed in this encounter.      Follow up for annual or prn.    Jody Teran

## 2019-04-22 NOTE — LETTER
April 22, 2019      Emanuel Arevalo Jr., MD  [address]  [facility] [address]           [facility]  [address]  [facility] [address]  Phone: [phone]          Patient: Shelby Thompson   MR Number: [id number]   YOB: 1950   Date of Visit: 4/22/2019       Dear Dr. Emanuel Arevalo Jr.:    Thank you for referring Shelby Thompson to me for evaluation. Attached you will find relevant portions of my assessment and plan of care.    If you have questions, please do not hesitate to call me. I look forward to following Shelby Thompson along with you.    Sincerely,    Jody Teran MD    Enclosure  CC:  No Recipients    If you would like to receive this communication electronically, please contact [email or web address] or [phone] to request more information on [employer] access.    For providers and/or their staff who would like to refer a patient to [facility], please contact us through our one-stop-shop provider referral line, [facility], at [phone].    If you feel you have received this communication in error or would no longer like to receive these types of communications, please e-mail [email or web address]

## 2019-04-30 ENCOUNTER — OFFICE VISIT (OUTPATIENT)
Dept: INTERNAL MEDICINE | Facility: CLINIC | Age: 69
End: 2019-04-30
Payer: MEDICARE

## 2019-04-30 ENCOUNTER — HOSPITAL ENCOUNTER (INPATIENT)
Facility: HOSPITAL | Age: 69
LOS: 8 days | Discharge: HOME | DRG: 189 | End: 2019-05-08
Attending: EMERGENCY MEDICINE | Admitting: INTERNAL MEDICINE
Payer: MEDICARE

## 2019-04-30 VITALS
DIASTOLIC BLOOD PRESSURE: 74 MMHG | BODY MASS INDEX: 30.59 KG/M2 | HEART RATE: 90 BPM | OXYGEN SATURATION: 87 % | WEIGHT: 172.63 LBS | SYSTOLIC BLOOD PRESSURE: 100 MMHG | HEIGHT: 63 IN

## 2019-04-30 DIAGNOSIS — D64.9 ANEMIA, UNSPECIFIED TYPE: ICD-10-CM

## 2019-04-30 DIAGNOSIS — C79.9 MULTIPLE LESIONS OF METASTATIC MALIGNANCY: ICD-10-CM

## 2019-04-30 DIAGNOSIS — I50.9 ACUTE EXACERBATION OF CHF (CONGESTIVE HEART FAILURE): ICD-10-CM

## 2019-04-30 DIAGNOSIS — R06.02 SOB (SHORTNESS OF BREATH): ICD-10-CM

## 2019-04-30 DIAGNOSIS — I50.9 ACUTE DECOMPENSATED HEART FAILURE: Primary | ICD-10-CM

## 2019-04-30 DIAGNOSIS — R07.9 CHEST PAIN: ICD-10-CM

## 2019-04-30 DIAGNOSIS — Z85.3 PERSONAL HISTORY OF MALIGNANT NEOPLASM OF BREAST: ICD-10-CM

## 2019-04-30 DIAGNOSIS — N17.9 AKI (ACUTE KIDNEY INJURY): ICD-10-CM

## 2019-04-30 DIAGNOSIS — R07.89 CHEST WALL PAIN: ICD-10-CM

## 2019-04-30 DIAGNOSIS — D47.2 GAMMOPATHY: ICD-10-CM

## 2019-04-30 DIAGNOSIS — R41.0 CONFUSION: ICD-10-CM

## 2019-04-30 DIAGNOSIS — R09.02 HYPOXIA: Primary | ICD-10-CM

## 2019-04-30 DIAGNOSIS — I49.9 ABNORMAL HEART RHYTHM: ICD-10-CM

## 2019-04-30 PROBLEM — I49.3 PVC (PREMATURE VENTRICULAR CONTRACTION): Status: ACTIVE | Noted: 2019-04-30

## 2019-04-30 PROBLEM — J96.01 ACUTE RESPIRATORY FAILURE WITH HYPOXIA: Status: ACTIVE | Noted: 2019-04-30

## 2019-04-30 PROBLEM — I50.33 ACUTE ON CHRONIC DIASTOLIC HEART FAILURE: Status: ACTIVE | Noted: 2019-04-30

## 2019-04-30 PROBLEM — I82.0 BUDD-CHIARI SYNDROME: Status: ACTIVE | Noted: 2019-04-30

## 2019-04-30 LAB
ABO + RH BLD: NORMAL
ALBUMIN SERPL BCP-MCNC: 2.8 G/DL (ref 3.5–5.2)
ALLENS TEST: ABNORMAL
ALP SERPL-CCNC: 55 U/L (ref 55–135)
ALT SERPL W/O P-5'-P-CCNC: 18 U/L (ref 10–44)
AMMONIA PLAS-SCNC: 68 UMOL/L (ref 10–50)
ANION GAP SERPL CALC-SCNC: 10 MMOL/L (ref 8–16)
AST SERPL-CCNC: 30 U/L (ref 10–40)
BACTERIA #/AREA URNS AUTO: ABNORMAL /HPF
BASOPHILS # BLD AUTO: 0.03 K/UL (ref 0–0.2)
BASOPHILS NFR BLD: 0.6 % (ref 0–1.9)
BILIRUB SERPL-MCNC: 0.4 MG/DL (ref 0.1–1)
BILIRUB UR QL STRIP: NEGATIVE
BLD GP AB SCN CELLS X3 SERPL QL: NORMAL
BNP SERPL-MCNC: 1070 PG/ML (ref 0–99)
BUN SERPL-MCNC: 20 MG/DL (ref 8–23)
CALCIUM SERPL-MCNC: 9.2 MG/DL (ref 8.7–10.5)
CHLORIDE SERPL-SCNC: 103 MMOL/L (ref 95–110)
CLARITY UR REFRACT.AUTO: ABNORMAL
CO2 SERPL-SCNC: 20 MMOL/L (ref 23–29)
COLOR UR AUTO: YELLOW
CREAT SERPL-MCNC: 1.7 MG/DL (ref 0.5–1.4)
CREAT UR-MCNC: 156 MG/DL (ref 15–325)
DELSYS: ABNORMAL
DIFFERENTIAL METHOD: ABNORMAL
EOSINOPHIL # BLD AUTO: 0.1 K/UL (ref 0–0.5)
EOSINOPHIL NFR BLD: 1.5 % (ref 0–8)
ERYTHROCYTE [DISTWIDTH] IN BLOOD BY AUTOMATED COUNT: 19.1 % (ref 11.5–14.5)
EST. GFR  (AFRICAN AMERICAN): 34.7 ML/MIN/1.73 M^2
EST. GFR  (NON AFRICAN AMERICAN): 30.1 ML/MIN/1.73 M^2
FERRITIN SERPL-MCNC: 54 NG/ML (ref 20–300)
GLUCOSE SERPL-MCNC: 86 MG/DL (ref 70–110)
GLUCOSE UR QL STRIP: NEGATIVE
HCO3 UR-SCNC: 31.4 MMOL/L (ref 24–28)
HCT VFR BLD AUTO: 23.3 % (ref 37–48.5)
HGB BLD-MCNC: 7.1 G/DL (ref 12–16)
HGB UR QL STRIP: NEGATIVE
HYALINE CASTS UR QL AUTO: 3 /LPF
IMM GRANULOCYTES # BLD AUTO: 0.02 K/UL (ref 0–0.04)
IMM GRANULOCYTES NFR BLD AUTO: 0.4 % (ref 0–0.5)
INR PPP: 1.2 (ref 0.8–1.2)
IRON SERPL-MCNC: 49 UG/DL (ref 30–160)
KETONES UR QL STRIP: NEGATIVE
LEUKOCYTE ESTERASE UR QL STRIP: NEGATIVE
LYMPHOCYTES # BLD AUTO: 1.1 K/UL (ref 1–4.8)
LYMPHOCYTES NFR BLD: 23.5 % (ref 18–48)
MAGNESIUM SERPL-MCNC: 2.5 MG/DL (ref 1.6–2.6)
MCH RBC QN AUTO: 24.6 PG (ref 27–31)
MCHC RBC AUTO-ENTMCNC: 30.5 G/DL (ref 32–36)
MCV RBC AUTO: 81 FL (ref 82–98)
MICROSCOPIC COMMENT: ABNORMAL
MONOCYTES # BLD AUTO: 0.5 K/UL (ref 0.3–1)
MONOCYTES NFR BLD: 11.1 % (ref 4–15)
NEUTROPHILS # BLD AUTO: 3 K/UL (ref 1.8–7.7)
NEUTROPHILS NFR BLD: 62.9 % (ref 38–73)
NITRITE UR QL STRIP: NEGATIVE
NRBC BLD-RTO: 0 /100 WBC
PCO2 BLDA: 46.6 MMHG (ref 35–45)
PH SMN: 7.44 [PH] (ref 7.35–7.45)
PH UR STRIP: 7 [PH] (ref 5–8)
PLATELET # BLD AUTO: 235 K/UL (ref 150–350)
PMV BLD AUTO: 10.7 FL (ref 9.2–12.9)
PO2 BLDA: 24 MMHG (ref 40–60)
POC BE: 7 MMOL/L
POC SATURATED O2: 45 % (ref 95–100)
POC TCO2: 33 MMOL/L (ref 24–29)
POTASSIUM SERPL-SCNC: 4.8 MMOL/L (ref 3.5–5.1)
PROT SERPL-MCNC: 14.1 G/DL (ref 6–8.4)
PROT UR QL STRIP: ABNORMAL
PROTHROMBIN TIME: 11.9 SEC (ref 9–12.5)
RBC # BLD AUTO: 2.89 M/UL (ref 4–5.4)
RBC #/AREA URNS AUTO: 0 /HPF (ref 0–4)
SAMPLE: ABNORMAL
SATURATED IRON: 16 % (ref 20–50)
SITE: ABNORMAL
SODIUM SERPL-SCNC: 133 MMOL/L (ref 136–145)
SP GR UR STRIP: 1.02 (ref 1–1.03)
SQUAMOUS #/AREA URNS AUTO: 1 /HPF
TOTAL IRON BINDING CAPACITY: 299 UG/DL (ref 250–450)
TRANSFERRIN SERPL-MCNC: 202 MG/DL (ref 200–375)
TROPONIN I SERPL DL<=0.01 NG/ML-MCNC: 0.01 NG/ML (ref 0–0.03)
TSH SERPL DL<=0.005 MIU/L-ACNC: 3.27 UIU/ML (ref 0.4–4)
URN SPEC COLLECT METH UR: ABNORMAL
UUN UR-MCNC: 581 MG/DL (ref 140–1050)
WBC # BLD AUTO: 4.77 K/UL (ref 3.9–12.7)
WBC #/AREA URNS AUTO: 1 /HPF (ref 0–5)

## 2019-04-30 PROCEDURE — 63600175 PHARM REV CODE 636 W HCPCS: Mod: HCNC | Performed by: STUDENT IN AN ORGANIZED HEALTH CARE EDUCATION/TRAINING PROGRAM

## 2019-04-30 PROCEDURE — 12000002 HC ACUTE/MED SURGE SEMI-PRIVATE ROOM: Mod: HCNC

## 2019-04-30 PROCEDURE — 25500020 PHARM REV CODE 255: Mod: HCNC | Performed by: EMERGENCY MEDICINE

## 2019-04-30 PROCEDURE — 84540 ASSAY OF URINE/UREA-N: CPT | Mod: HCNC

## 2019-04-30 PROCEDURE — 99999 PR PBB SHADOW E&M-EST. PATIENT-LVL III: CPT | Mod: PBBFAC,HCNC,, | Performed by: INTERNAL MEDICINE

## 2019-04-30 PROCEDURE — 81001 URINALYSIS AUTO W/SCOPE: CPT | Mod: HCNC

## 2019-04-30 PROCEDURE — 25000003 PHARM REV CODE 250: Mod: HCNC | Performed by: STUDENT IN AN ORGANIZED HEALTH CARE EDUCATION/TRAINING PROGRAM

## 2019-04-30 PROCEDURE — 99285 PR EMERGENCY DEPT VISIT,LEVEL V: ICD-10-PCS | Mod: HCNC,,, | Performed by: EMERGENCY MEDICINE

## 2019-04-30 PROCEDURE — 82140 ASSAY OF AMMONIA: CPT | Mod: HCNC

## 2019-04-30 PROCEDURE — 99900035 HC TECH TIME PER 15 MIN (STAT): Mod: HCNC

## 2019-04-30 PROCEDURE — 96361 HYDRATE IV INFUSION ADD-ON: CPT | Mod: HCNC

## 2019-04-30 PROCEDURE — 82570 ASSAY OF URINE CREATININE: CPT | Mod: HCNC

## 2019-04-30 PROCEDURE — 85025 COMPLETE CBC W/AUTO DIFF WBC: CPT | Mod: HCNC

## 2019-04-30 PROCEDURE — 96372 THER/PROPH/DIAG INJ SC/IM: CPT | Mod: 59,HCNC

## 2019-04-30 PROCEDURE — 99024 POSTOP FOLLOW-UP VISIT: CPT | Mod: HCNC,S$GLB,, | Performed by: INTERNAL MEDICINE

## 2019-04-30 PROCEDURE — 99285 EMERGENCY DEPT VISIT HI MDM: CPT | Mod: 25,HCNC

## 2019-04-30 PROCEDURE — 82803 BLOOD GASES ANY COMBINATION: CPT | Mod: HCNC

## 2019-04-30 PROCEDURE — 83880 ASSAY OF NATRIURETIC PEPTIDE: CPT | Mod: HCNC

## 2019-04-30 PROCEDURE — 84484 ASSAY OF TROPONIN QUANT: CPT | Mod: HCNC

## 2019-04-30 PROCEDURE — 93010 EKG 12-LEAD: ICD-10-PCS | Mod: HCNC,,, | Performed by: INTERNAL MEDICINE

## 2019-04-30 PROCEDURE — 83735 ASSAY OF MAGNESIUM: CPT | Mod: HCNC

## 2019-04-30 PROCEDURE — 93005 ELECTROCARDIOGRAM TRACING: CPT | Mod: HCNC

## 2019-04-30 PROCEDURE — 93010 ELECTROCARDIOGRAM REPORT: CPT | Mod: HCNC,,, | Performed by: INTERNAL MEDICINE

## 2019-04-30 PROCEDURE — 99024 PR POST-OP FOLLOW-UP VISIT: ICD-10-PCS | Mod: HCNC,S$GLB,, | Performed by: INTERNAL MEDICINE

## 2019-04-30 PROCEDURE — 84443 ASSAY THYROID STIM HORMONE: CPT | Mod: HCNC

## 2019-04-30 PROCEDURE — 83540 ASSAY OF IRON: CPT | Mod: HCNC

## 2019-04-30 PROCEDURE — 86850 RBC ANTIBODY SCREEN: CPT | Mod: HCNC

## 2019-04-30 PROCEDURE — 80053 COMPREHEN METABOLIC PANEL: CPT | Mod: HCNC

## 2019-04-30 PROCEDURE — 99285 EMERGENCY DEPT VISIT HI MDM: CPT | Mod: HCNC,,, | Performed by: EMERGENCY MEDICINE

## 2019-04-30 PROCEDURE — 82728 ASSAY OF FERRITIN: CPT | Mod: HCNC

## 2019-04-30 PROCEDURE — 96374 THER/PROPH/DIAG INJ IV PUSH: CPT | Mod: HCNC

## 2019-04-30 PROCEDURE — 99999 PR PBB SHADOW E&M-EST. PATIENT-LVL III: ICD-10-PCS | Mod: PBBFAC,HCNC,, | Performed by: INTERNAL MEDICINE

## 2019-04-30 PROCEDURE — 96376 TX/PRO/DX INJ SAME DRUG ADON: CPT | Mod: HCNC

## 2019-04-30 PROCEDURE — 85610 PROTHROMBIN TIME: CPT | Mod: HCNC

## 2019-04-30 RX ORDER — AMLODIPINE BESYLATE 5 MG/1
5 TABLET ORAL DAILY
Status: DISCONTINUED | OUTPATIENT
Start: 2019-05-01 | End: 2019-05-04

## 2019-04-30 RX ORDER — IBUPROFEN 200 MG
16 TABLET ORAL
Status: DISCONTINUED | OUTPATIENT
Start: 2019-04-30 | End: 2019-05-08 | Stop reason: HOSPADM

## 2019-04-30 RX ORDER — NAPROXEN SODIUM 220 MG/1
81 TABLET, FILM COATED ORAL DAILY
Status: DISCONTINUED | OUTPATIENT
Start: 2019-05-01 | End: 2019-05-08 | Stop reason: HOSPADM

## 2019-04-30 RX ORDER — LACTULOSE 10 G/15ML
15 SOLUTION ORAL ONCE
Status: COMPLETED | OUTPATIENT
Start: 2019-04-30 | End: 2019-04-30

## 2019-04-30 RX ORDER — SODIUM CHLORIDE 0.9 % (FLUSH) 0.9 %
10 SYRINGE (ML) INJECTION
Status: DISCONTINUED | OUTPATIENT
Start: 2019-04-30 | End: 2019-05-08 | Stop reason: HOSPADM

## 2019-04-30 RX ORDER — GABAPENTIN 300 MG/1
300 CAPSULE ORAL 2 TIMES DAILY
Status: DISCONTINUED | OUTPATIENT
Start: 2019-04-30 | End: 2019-05-08 | Stop reason: HOSPADM

## 2019-04-30 RX ORDER — FUROSEMIDE 10 MG/ML
20 INJECTION INTRAMUSCULAR; INTRAVENOUS
Status: COMPLETED | OUTPATIENT
Start: 2019-04-30 | End: 2019-04-30

## 2019-04-30 RX ORDER — PANTOPRAZOLE SODIUM 40 MG/1
40 TABLET, DELAYED RELEASE ORAL DAILY
Status: DISCONTINUED | OUTPATIENT
Start: 2019-05-01 | End: 2019-05-08 | Stop reason: HOSPADM

## 2019-04-30 RX ORDER — PANTOPRAZOLE SODIUM 40 MG/10ML
80 INJECTION, POWDER, LYOPHILIZED, FOR SOLUTION INTRAVENOUS ONCE
Status: DISCONTINUED | OUTPATIENT
Start: 2019-04-30 | End: 2019-04-30

## 2019-04-30 RX ORDER — ACETAMINOPHEN 325 MG/1
650 TABLET ORAL EVERY 8 HOURS PRN
Status: DISCONTINUED | OUTPATIENT
Start: 2019-04-30 | End: 2019-05-08 | Stop reason: HOSPADM

## 2019-04-30 RX ORDER — ENOXAPARIN SODIUM 100 MG/ML
40 INJECTION SUBCUTANEOUS EVERY 24 HOURS
Status: DISCONTINUED | OUTPATIENT
Start: 2019-04-30 | End: 2019-04-30

## 2019-04-30 RX ORDER — TRAMADOL HYDROCHLORIDE 50 MG/1
50 TABLET ORAL 2 TIMES DAILY PRN
Status: DISCONTINUED | OUTPATIENT
Start: 2019-04-30 | End: 2019-05-05

## 2019-04-30 RX ORDER — IBUPROFEN 200 MG
24 TABLET ORAL
Status: DISCONTINUED | OUTPATIENT
Start: 2019-04-30 | End: 2019-05-08 | Stop reason: HOSPADM

## 2019-04-30 RX ORDER — SERTRALINE HYDROCHLORIDE 50 MG/1
50 TABLET, FILM COATED ORAL DAILY
Status: DISCONTINUED | OUTPATIENT
Start: 2019-05-01 | End: 2019-05-08 | Stop reason: HOSPADM

## 2019-04-30 RX ORDER — FUROSEMIDE 10 MG/ML
40 INJECTION INTRAMUSCULAR; INTRAVENOUS ONCE
Status: COMPLETED | OUTPATIENT
Start: 2019-04-30 | End: 2019-04-30

## 2019-04-30 RX ORDER — AMLODIPINE BESYLATE 5 MG/1
TABLET ORAL
Qty: 90 TABLET | Refills: 3 | Status: SHIPPED | OUTPATIENT
Start: 2019-04-30 | End: 2020-05-24

## 2019-04-30 RX ORDER — PANTOPRAZOLE SODIUM 40 MG/1
40 TABLET, DELAYED RELEASE ORAL DAILY
Status: DISCONTINUED | OUTPATIENT
Start: 2019-05-01 | End: 2019-04-30

## 2019-04-30 RX ORDER — FUROSEMIDE 10 MG/ML
20 INJECTION INTRAMUSCULAR; INTRAVENOUS ONCE
Status: DISCONTINUED | OUTPATIENT
Start: 2019-04-30 | End: 2019-04-30

## 2019-04-30 RX ORDER — GLUCAGON 1 MG
1 KIT INJECTION
Status: DISCONTINUED | OUTPATIENT
Start: 2019-04-30 | End: 2019-05-08 | Stop reason: HOSPADM

## 2019-04-30 RX ORDER — PANTOPRAZOLE SODIUM 40 MG/10ML
40 INJECTION, POWDER, LYOPHILIZED, FOR SOLUTION INTRAVENOUS EVERY 12 HOURS
Status: DISCONTINUED | OUTPATIENT
Start: 2019-05-01 | End: 2019-04-30

## 2019-04-30 RX ORDER — SODIUM CHLORIDE 0.9 % (FLUSH) 0.9 %
10 SYRINGE (ML) INJECTION
Status: DISCONTINUED | OUTPATIENT
Start: 2019-04-30 | End: 2019-04-30

## 2019-04-30 RX ADMIN — FUROSEMIDE 40 MG: 10 INJECTION, SOLUTION INTRAMUSCULAR; INTRAVENOUS at 09:04

## 2019-04-30 RX ADMIN — SODIUM CHLORIDE, SODIUM LACTATE, POTASSIUM CHLORIDE, AND CALCIUM CHLORIDE 500 ML: .6; .31; .03; .02 INJECTION, SOLUTION INTRAVENOUS at 11:04

## 2019-04-30 RX ADMIN — FUROSEMIDE 20 MG: 10 INJECTION, SOLUTION INTRAMUSCULAR; INTRAVENOUS at 03:04

## 2019-04-30 RX ADMIN — LACTULOSE 15 G: 20 SOLUTION ORAL at 09:04

## 2019-04-30 RX ADMIN — ENOXAPARIN SODIUM 40 MG: 100 INJECTION SUBCUTANEOUS at 06:04

## 2019-04-30 RX ADMIN — IOHEXOL 100 ML: 350 INJECTION, SOLUTION INTRAVENOUS at 01:04

## 2019-04-30 RX ADMIN — GABAPENTIN 300 MG: 300 CAPSULE ORAL at 09:04

## 2019-04-30 NOTE — MEDICAL/APP STUDENT
History     CC:  70 yo female with hx of breast cancer and HTN presenting to the ED with progressive SOB for 2 weeks duration.    HPI:  SOB started 2 weeks ago and progressively worsened. Pt went to Dr. Arevalo's clinic and was advised to go to the ED. The SOB worsened upon exertion and lying down, and relieved by rest and sitting up. She also reports  having had palpitations and occasional coughs with white mucus and runny nose starting 2 weeks ago. Pt thinks her SOB is due to recent stress from taking care of her sick . Pt also complains of occasional abdominal swelling and pain and left-sided flank pain.    Pt has a hx of stroke in 2008 and 2011. Pt denies chest pain, syncope, fever/chills, sore throat, hx of heart or lung disease. She was a long-term smoker (3 packs/day) for 20 years (quit in 2008).       Past Medical History:   Diagnosis Date    Allergy     Anemia     Aortic aneurysm     Breast cancer 10/2011    left breast Stage 0 DCIS    Chronic diastolic congestive heart failure 11/6/2015    Colon polyp     GERD (gastroesophageal reflux disease)     History of colonic polyps     HX: breast cancer     Hyperlipemia     Hypertension     ICH (intracerebral hemorrhage)     Major depressive disorder, single episode, mild 6/23/2016    Nuclear sclerosis 7/21/2014    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 7/21/2014    PAD (peripheral artery disease) 11/6/2015    Stroke 4/2011       Past Surgical History:   Procedure Laterality Date    APPENDECTOMY      BIOPSY LIVER AND ULTRASOUND N/A 4/6/2015    Performed by [facility] at [facility] OR 2ND FLR    BREAST BIOPSY Left 10/2011    BREAST LUMPECTOMY Left 2011    DCIS    COLONOSCOPY      COLONOSCOPY N/A 3/26/2015    Performed by Wicho Woodard [facility] at [facility] ENDO (4TH FLR)    CYST REMOVAL      back    ESOPHAGOGASTRODUODENOSCOPY  07/2016    duodenal angioectasia    ESOPHAGOGASTRODUODENOSCOPY (EGD) N/A  2016    Performed by [facility] at [facility] (4TH FLR)    FOOT FRACTURE SURGERY  10/2012    right foot, with R hallux valgus repair    FRACTURE SURGERY Right     broken toe repiar    HEMORRHOID SURGERY      LIVER BIOPSY      TUBAL LIGATION      UPPER GASTROINTESTINAL ENDOSCOPY         Family History   Problem Relation Age of Onset    No Known Problems Sister     Cancer Sister 63        Lung Cancer    No Known Problems Mother     Cancer Father     No Known Problems Brother     Hypertension Daughter     Fibroids Daughter         uterine    Hypertension Son     Breast cancer Sister 62    No Known Problems Brother     No Known Problems Maternal Aunt     No Known Problems Maternal Uncle     No Known Problems Paternal Aunt     No Known Problems Paternal Uncle     Hypertension Maternal Grandmother     No Known Problems Maternal Grandfather     No Known Problems Paternal Grandmother     No Known Problems Paternal Grandfather     Ovarian cancer Neg Hx     Colon cancer Neg Hx     Tremor Neg Hx     Amblyopia Neg Hx     Blindness Neg Hx     Cataracts Neg Hx     Diabetes Neg Hx     Glaucoma Neg Hx     Macular degeneration Neg Hx     Retinal detachment Neg Hx     Strabismus Neg Hx     Stroke Neg Hx     Thyroid disease Neg Hx     Esophageal cancer Neg Hx     Rectal cancer Neg Hx     Stomach cancer Neg Hx     Ulcerative colitis Neg Hx     Crohn's disease Neg Hx     Irritable bowel syndrome Neg Hx     Celiac disease Neg Hx        Social History     Tobacco Use    Smoking status: Former Smoker     Packs/day: 0.50     Years: 20.00     Pack years: 10.00     Types: Cigarettes     Last attempt to quit: 2011     Years since quittin.0    Smokeless tobacco: Former User     Quit date: 4/3/2011   Substance Use Topics    Alcohol use: Yes     Comment: on occasion - one glass wine every 3 months    Drug use: No       Review of Systems   Constitutional: Negative.  Negative for  "chills and fever.   HENT: Positive for rhinorrhea (clear). Negative for sore throat.    Eyes: Negative.    Respiratory: Positive for cough (white mucus) and shortness of breath.    Cardiovascular: Positive for palpitations. Negative for chest pain and leg swelling.   Gastrointestinal: Positive for abdominal distention and abdominal pain. Negative for constipation, diarrhea, nausea and vomiting.   Endocrine: Negative.    Genitourinary: Positive for flank pain (left). Negative for dysuria, frequency and hematuria.   Skin: Negative.    Allergic/Immunologic: Negative.    Neurological: Negative.  Negative for dizziness, tremors, seizures, syncope, speech difficulty and headaches.   Hematological: Negative.    Psychiatric/Behavioral: Negative.    All other systems reviewed and are negative.      Physical Exam   BP (!) 146/68   Pulse 78   Temp 98.2 °F (36.8 °C) (Oral)   Resp 19   Ht 5' 3" (1.6 m)   Wt 80.3 kg (177 lb)   LMP  (LMP Unknown)   SpO2 (!) 93%   Breastfeeding? No   BMI 31.35 kg/m²     Physical Exam    Constitutional: She appears well-developed and well-nourished.   Cannot speak in full sentences due to SOB.   HENT:   Head: Normocephalic and atraumatic.   Eyes: Conjunctivae and EOM are normal. Pupils are equal, round, and reactive to light.   Neck: Normal range of motion. Neck supple. No JVD present.   Cardiovascular: An irregular rhythm present.  Bradycardia present.  Exam reveals gallop and S3. Exam reveals no friction rub.    No murmur heard.  Heart beats drop on every 3rd beat.   Pulmonary/Chest: Accessory muscle usage present. She has decreased breath sounds. She has no wheezes. She has rales (R lower lung). She exhibits no tenderness.   Abdominal: She exhibits distension. There is tenderness (diffuse, worst on L side). There is guarding. There is no rebound and negative Dewey's sign.   Genitourinary:   Genitourinary Comments: Left-sided flank pain   Musculoskeletal: Normal range of motion. She " "exhibits no edema.   Neurological: She is alert and oriented to person, place, and time.   Psychiatric: She has a normal mood and affect. Her behavior is normal. Judgment and thought content normal.         ED Course     CTA chest:  negative for PE.   "Patchy ground-glass opacities throughout the lungs", may represent edema. Further evaluation with non-contrast CT.     CXR:  "Heart is enlarged.  Slight accentuation of the basilar interstitial markings identified.  No significant airspace consolidation or pleural effusion identified"      Assessment & Plan:    Differentials for SOB:  CHF, COPD, pneumonia, hyperthyroidism.    Differentials for ascites: Budd-Chiari, liver failure, cirrhosis,     Acute decompensated CHF:   - Sx of progressive SOB relieved by rest, orthopnea, S3 sound, and CXR shows evidence of HF: enlarged heart, interstitial markings.   - elevated BNP (1070) confirms diagnosis of CHF.  - treat with diuretic furosemide IV 20mg  - will add on BB metoprolol when pt is stable.    Budd-Chiari syndrome:  - Sx of abdominal pain and ascites  - hx of stroke: thrombotic state  - anticoagulant  - continue home aspirin 81mg  - monitor LFT and PT, aPTT and INR    Microcytic anemia:  - Hb 7.1. MCV 81.  Type & cross.  Transfuse if Hb < 7.       "

## 2019-04-30 NOTE — ED TRIAGE NOTES
Shelby Tohmpson, a 69 y.o. female presents to the ED w/ complaint of shortness of breath with exertion, pt denies chest pain, nausea, chills, or cough.  Pt denies any history of CHF, COPD.     Triage note:  Chief Complaint   Patient presents with    Shortness of Breath     sent from IM clinic to rule out pe arrived on 2l nc     Review of patient's allergies indicates:   Allergen Reactions    Pravastatin Other (See Comments)     Itching, elevated cpk, muscle aches     Past Medical History:   Diagnosis Date    Allergy     Anemia     Aortic aneurysm     Breast cancer 10/2011    left breast Stage 0 DCIS    Chronic diastolic congestive heart failure 11/6/2015    Colon polyp     GERD (gastroesophageal reflux disease)     History of colonic polyps     HX: breast cancer     Hyperlipemia     Hypertension     ICH (intracerebral hemorrhage)     Major depressive disorder, single episode, mild 6/23/2016    Nuclear sclerosis 7/21/2014    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 7/21/2014    PAD (peripheral artery disease) 11/6/2015    Stroke 4/2011     LOC: Patient name and date of birth verified. The patient is awake, alert and aware of environment with an appropriate affect, the patient is oriented x 3 and speaking appropriately.   APPEARANCE: Patient resting comfortably, patient is clean and well groomed, patient's clothing is properly fastened.  SKIN: The skin is warm and dry, color consistent with ethnicity, patient has normal skin turgor and moist mucus membranes, skin intact, no breakdown or bruising noted.  MUSCULOSKELETAL: Patient moving all extremities well, no obvious swelling or deformities noted.   RESPIRATORY: Respirations are spontaneous, patient has a normal effort and rate, no accessory muscle use noted. Shortness of breath with exertion   CARDIAC: Patient has a normal rate and rhythm, no periphreal edema noted, capillary refill < 3 seconds.  ABDOMEN: Soft and  non tender to palpation, no distention noted. Bowel sounds present in all four quadrants.  NEUROLOGIC: Eyes open spontaneously, behavior appropriate to situation, follows commands, facial expression symmetrical, bilateral hand grasp equal and even, purposeful motor response noted, normal sensation in all extremities when touched with a finger.

## 2019-04-30 NOTE — ED PROVIDER NOTES
Encounter Date: 4/30/2019       History     Chief Complaint   Patient presents with    Shortness of Breath     sent from IM clinic to rule out pe arrived on 2l nc     Patient is a 70yo female who presents with shortness of breath with exertion for the past 2-3 weeks.  notes that she's also been confused over the past couple of weeks. She was seen by her PCP this morning, who recorded an SaO2 of 87% and sent her to the ED for PE r/o and possible admission. She also has left flank and chest pain. She states that the pain feels like it's under her breast and wraps around her side to her back. She gets relief with tramadol and by sitting on the edge of her recliner chair at home. The pain is constant, but does not radiate. Palpation makes the pain worse. She denies any nausea, vomiting, diaphoresis or worsening SOB with increases in her pain. Patient reports that she used to be able to get around without an issue, but now gets so short of breath going from one room to another that she has to sit down.    Patient has a history of breast cancer, HFpEF, HTN, and CVA (residual deficits of weakness and tremor on right side). She uses a rollator to get around, and there is no acute change with this. Patient's  states that the PCP was also concerned about her recent CT scan and wanted her to be admitted for continued work up of her abnormal CT scan. Patient denies any issues with bleeding, bruising, or LE edema.        Review of patient's allergies indicates:   Allergen Reactions    Pravastatin Other (See Comments)     Itching, elevated cpk, muscle aches     Past Medical History:   Diagnosis Date    Allergy     Anemia     Aortic aneurysm     Breast cancer 10/2011    left breast Stage 0 DCIS    Chronic diastolic congestive heart failure 11/6/2015    Colon polyp     GERD (gastroesophageal reflux disease)     History of colonic polyps     HX: breast cancer     Hyperlipemia     Hypertension     ICH  (intracerebral hemorrhage)     Major depressive disorder, single episode, mild 6/23/2016    Nuclear sclerosis 7/21/2014    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 7/21/2014    PAD (peripheral artery disease) 11/6/2015    Stroke 4/2011     Past Surgical History:   Procedure Laterality Date    APPENDECTOMY      BIOPSY LIVER AND ULTRASOUND N/A 4/6/2015    Performed by [facility] OR 2ND FLR    BREAST BIOPSY Left 10/2011    BREAST LUMPECTOMY Left 2011    DCIS    COLONOSCOPY      COLONOSCOPY N/A 3/26/2015    Performed by Wicho Woodard [facility] at [facility] ENDO (4TH FLR)    CYST REMOVAL      back    ESOPHAGOGASTRODUODENOSCOPY  07/2016    duodenal angioectasia    ESOPHAGOGASTRODUODENOSCOPY (EGD) N/A 7/27/2016    Performed by Malik Sanchez MD at [facility] ENDO (4TH FLR)    FOOT FRACTURE SURGERY  10/2012    right foot, with R hallux valgus repair    FRACTURE SURGERY Right     broken toe repiar    HEMORRHOID SURGERY      LIVER BIOPSY      TUBAL LIGATION      UPPER GASTROINTESTINAL ENDOSCOPY       Family History   Problem Relation Age of Onset    No Known Problems Sister     Cancer Sister 63        Lung Cancer    No Known Problems Mother     Cancer Father     No Known Problems Brother     Hypertension Daughter     Fibroids Daughter         uterine    Hypertension Son     Breast cancer Sister 62    No Known Problems Brother     No Known Problems Maternal Aunt     No Known Problems Maternal Uncle     No Known Problems Paternal Aunt     No Known Problems Paternal Uncle     Hypertension Maternal Grandmother     No Known Problems Maternal Grandfather     No Known Problems Paternal Grandmother     No Known Problems Paternal Grandfather     Ovarian cancer Neg Hx     Colon cancer Neg Hx     Tremor Neg Hx     Amblyopia Neg Hx     Blindness Neg Hx     Cataracts Neg Hx     Diabetes Neg Hx     Glaucoma Neg Hx     Macular degeneration Neg Hx      Retinal detachment Neg Hx     Strabismus Neg Hx     Stroke Neg Hx     Thyroid disease Neg Hx     Esophageal cancer Neg Hx     Rectal cancer Neg Hx     Stomach cancer Neg Hx     Ulcerative colitis Neg Hx     Crohn's disease Neg Hx     Irritable bowel syndrome Neg Hx     Celiac disease Neg Hx      Social History     Tobacco Use    Smoking status: Former Smoker     Packs/day: 0.50     Years: 20.00     Pack years: 10.00     Types: Cigarettes     Last attempt to quit: 2011     Years since quittin.0    Smokeless tobacco: Former User     Quit date: 4/3/2011   Substance Use Topics    Alcohol use: Yes     Comment: on occasion - one glass wine every 3 months    Drug use: No     Review of Systems   Constitutional: Positive for fatigue. Negative for diaphoresis, fever and unexpected weight change.   HENT: Negative for congestion and sore throat.    Eyes: Negative for visual disturbance.   Respiratory: Positive for shortness of breath. Negative for cough.    Cardiovascular: Positive for chest pain. Negative for palpitations and leg swelling.   Gastrointestinal: Negative for abdominal distention, blood in stool, constipation, diarrhea and nausea.   Genitourinary: Positive for flank pain. Negative for dysuria.   Musculoskeletal: Positive for back pain.   Skin: Negative for rash.   Neurological: Positive for tremors and weakness.   Hematological: Does not bruise/bleed easily.       Physical Exam     Initial Vitals [19 0957]   BP Pulse Resp Temp SpO2   115/62 84 18 98.2 °F (36.8 °C) 96 %      MAP       --         Physical Exam    Nursing note and vitals reviewed.  Constitutional: She appears well-developed and well-nourished. She is not diaphoretic.   HENT:   Head: Normocephalic and atraumatic.   Mouth/Throat: Oropharynx is clear and moist.   Eyes: Conjunctivae and EOM are normal. No scleral icterus.   Neck: Normal range of motion. Neck supple.   Cardiovascular: Normal rate, regular rhythm, normal heart  sounds and intact distal pulses.   Pulmonary/Chest: Breath sounds normal. No respiratory distress. She has no wheezes. She has no rales. She exhibits tenderness.   Abdominal: Soft. Bowel sounds are normal. She exhibits no distension. There is tenderness in the left upper quadrant. There is no CVA tenderness.   Musculoskeletal: Normal range of motion. She exhibits no edema or tenderness.   TTP of left thoracic back   Neurological: She is alert. She has normal strength. No sensory deficit.   Oriented x3, but has some delay in answering questions about herself and keeping consistent timelines. Relies on  to answer many questions.   Skin: Skin is warm and dry. Capillary refill takes less than 2 seconds. No rash noted.         ED Course   Procedures  Labs Reviewed   COMPREHENSIVE METABOLIC PANEL - Abnormal; Notable for the following components:       Result Value    Sodium 133 (*)     CO2 20 (*)     Creatinine 1.7 (*)     Total Protein 14.1 (*)     Albumin 2.8 (*)     eGFR if  34.7 (*)     eGFR if non  30.1 (*)     All other components within normal limits   AMMONIA - Abnormal; Notable for the following components:    Ammonia 68 (*)     All other components within normal limits   CBC W/ AUTO DIFFERENTIAL - Abnormal; Notable for the following components:    RBC 2.89 (*)     Hemoglobin 7.1 (*)     Hematocrit 23.3 (*)     Mean Corpuscular Volume 81 (*)     Mean Corpuscular Hemoglobin 24.6 (*)     Mean Corpuscular Hemoglobin Conc 30.5 (*)     RDW 19.1 (*)     All other components within normal limits    Narrative:     ADD ON BNP PER: MARCUS KOCH MD  04/30/2019  11:24    B-TYPE NATRIURETIC PEPTIDE - Abnormal; Notable for the following components:    BNP 1,070 (*)     All other components within normal limits    Narrative:     ADD ON BNP PER: MARCUS KOCH MD  04/30/2019  11:24    ISTAT PROCEDURE - Abnormal; Notable for the following components:    POC PCO2 46.6 (*)      POC PO2 24 (*)     POC HCO3 31.4 (*)     POC SATURATED O2 45 (*)     POC TCO2 33 (*)     All other components within normal limits   MAGNESIUM   PROTIME-INR   TSH   B-TYPE NATRIURETIC PEPTIDE   TROPONIN I   URINALYSIS, REFLEX TO URINE CULTURE     EKG Readings: (Independently Interpreted)   Sinus 86, no e/o acute ischemia       Imaging Results          X-Ray Chest PA And Lateral (Final result)  Result time 04/30/19 10:42:46    Final result by Lester Salcido MD (04/30/19 10:42:46)                 Impression:      See above      Electronically signed by: Lester Salcido MD  Date:    04/30/2019  Time:    10:42             Narrative:    EXAMINATION:  XR CHEST PA AND LATERAL    CLINICAL HISTORY:  Shortness of breath    TECHNIQUE:  PA and lateral views of the chest were performed.    COMPARISON:  03/24/2015 none    FINDINGS:  Heart is enlarged.  Slight accentuation of the basilar interstitial markings identified.  No significant airspace consolidation or pleural effusion identified                              X-Rays:   Independently Interpreted Readings:   Chest X-Ray: No infiltrates. Cardiomegaly present.     Medical Decision Making:   History:   Old Medical Records: I decided to obtain old medical records.  Initial Assessment:   HO-II Assessment    This is the emergent evaluation of a 69 y.o. female who presents with chief complaint of ACEVEDO. She presents with stable vitals, is nontoxic and afebrile. Sating 94-97% on RA. Most recent CT scan showing innumerable enhancing areas on liver that were not present 3-4 weeks prior. Will get CT PE study, but patient may need additional imaging to investigate enhancements on abd CT.  Discussed with Dr. Hasmukh Ku MD  [facility]/Emergency Medicine HO-II  04/30/2019 11:03 AM        Independently Interpreted Test(s):   I have ordered and independently interpreted X-rays - see prior notes.  I have ordered and independently interpreted EKG Reading(s) - see  prior notes  Clinical Tests:   Lab Tests: Ordered and Reviewed  Radiological Study: Reviewed and Ordered  Medical Tests: Ordered and Reviewed  ED Management:  HO-II Update  CT PE study negative for PE, appears to have scattered ground glass and (stable) tortuous aorta. Mental status unchanged. Pain persists. Will diurese. And consult Medicine for Inpatient admission for acute decompensated HFpEF, anemia, and AMS, and for malignancy evaluation.  Madalyn Ku MD  [facility] Internal Medicine/Emergency Medicine HO-II  04/30/2019 4:28 PM  Other:   I have discussed this case with another health care provider.       <> Summary of the Discussion: Hospital medicine              Attending Attestation:   Physician Attestation Statement for Resident:  As the supervising MD   Physician Attestation Statement: I have personally seen and examined this patient.   I agree with the above history. -:   As the supervising MD I agree with the above PE.    As the supervising MD I agree with the above treatment, course, plan, and disposition.  I have reviewed and agree with the residents interpretation of the following: lab data, x-rays and EKG.                       Clinical Impression:       ICD-10-CM ICD-9-CM   1. Acute decompensated heart failure I50.9 428.0   2. SOB (shortness of breath) R06.02 786.05   3. Confusion R41.0 298.9   4. Anemia, unspecified type D64.9 285.9         Disposition:   Disposition: Admitted  Condition: Stable                        Madalyn Ku MD  Resident  04/30/19 1630       Elsi Noe MD  05/01/19 [id number]

## 2019-04-30 NOTE — LETTER
May 7, 2019         Jacquelyn De Oliveira  [place] [address]  Phone: [phone]  Fax: [phone]       Patient: Shelby Thompson   YOB: 1950  Date of Visit: 05/07/2019    To Whom It May Concern:    Barb Thompson  was at [facility] on 05/07/2019. She is admitted to the hospital from 04/30/2019 until today (05/07/2019). Patient requires treatment expected to be for 4 months after discharge. If you have any questions or concerns, or if I can be of further assistance, please do not hesitate to contact me.    Sincerely,    Sabine Patel MD

## 2019-05-01 PROBLEM — N17.9 AKI (ACUTE KIDNEY INJURY): Status: ACTIVE | Noted: 2019-05-01

## 2019-05-01 LAB
AFP SERPL-MCNC: 1.5 NG/ML (ref 0–8.4)
ALBUMIN SERPL BCP-MCNC: 2.9 G/DL (ref 3.5–5.2)
ALP SERPL-CCNC: 63 U/L (ref 55–135)
ALT SERPL W/O P-5'-P-CCNC: 15 U/L (ref 10–44)
ANION GAP SERPL CALC-SCNC: 7 MMOL/L (ref 8–16)
ANION GAP SERPL CALC-SCNC: 9 MMOL/L (ref 8–16)
ASCENDING AORTA: 3.61 CM
AST SERPL-CCNC: 17 U/L (ref 10–40)
AV INDEX (PROSTH): 0.59
AV MEAN GRADIENT: 9.24 MMHG
AV PEAK GRADIENT: 14.14 MMHG
AV VALVE AREA: 2.09 CM2
AV VELOCITY RATIO: 0.61
BASOPHILS # BLD AUTO: 0.02 K/UL (ref 0–0.2)
BASOPHILS NFR BLD: 0.4 % (ref 0–1.9)
BILIRUB SERPL-MCNC: 0.5 MG/DL (ref 0.1–1)
BSA FOR ECHO PROCEDURE: 1.83 M2
BUN SERPL-MCNC: 22 MG/DL (ref 8–23)
BUN SERPL-MCNC: 24 MG/DL (ref 8–23)
CALCIUM SERPL-MCNC: 9.3 MG/DL (ref 8.7–10.5)
CALCIUM SERPL-MCNC: 9.5 MG/DL (ref 8.7–10.5)
CANCER AG125 SERPL-ACNC: 7 U/ML (ref 0–30)
CANCER AG19-9 SERPL-ACNC: 4 U/ML (ref 2–40)
CEA SERPL-MCNC: 1.2 NG/ML (ref 0–5)
CHLORIDE SERPL-SCNC: 98 MMOL/L (ref 95–110)
CHLORIDE SERPL-SCNC: 98 MMOL/L (ref 95–110)
CO2 SERPL-SCNC: 28 MMOL/L (ref 23–29)
CO2 SERPL-SCNC: 28 MMOL/L (ref 23–29)
CREAT SERPL-MCNC: 1.9 MG/DL (ref 0.5–1.4)
CREAT SERPL-MCNC: 2.1 MG/DL (ref 0.5–1.4)
CV ECHO LV RWT: 0.41 CM
DIFFERENTIAL METHOD: ABNORMAL
DOP CALC AO PEAK VEL: 1.88 M/S
DOP CALC AO VTI: 39.68 CM
DOP CALC LVOT AREA: 3.53 CM2
DOP CALC LVOT DIAMETER: 2.12 CM
DOP CALC LVOT PEAK VEL: 1.15 M/S
DOP CALC LVOT STROKE VOLUME: 82.73 CM3
DOP CALCLVOT PEAK VEL VTI: 23.45 CM
E WAVE DECELERATION TIME: 216.2 MSEC
E/A RATIO: 0.91
E/E' RATIO: 14
ECHO LV POSTERIOR WALL: 0.91 CM (ref 0.6–1.1)
EOSINOPHIL # BLD AUTO: 0.1 K/UL (ref 0–0.5)
EOSINOPHIL NFR BLD: 1.3 % (ref 0–8)
ERYTHROCYTE [DISTWIDTH] IN BLOOD BY AUTOMATED COUNT: 20.6 % (ref 11.5–14.5)
EST. GFR  (AFRICAN AMERICAN): 26.9 ML/MIN/1.73 M^2
EST. GFR  (AFRICAN AMERICAN): 30.4 ML/MIN/1.73 M^2
EST. GFR  (NON AFRICAN AMERICAN): 23.3 ML/MIN/1.73 M^2
EST. GFR  (NON AFRICAN AMERICAN): 26.3 ML/MIN/1.73 M^2
FRACTIONAL SHORTENING: 41 % (ref 28–44)
GLUCOSE SERPL-MCNC: 108 MG/DL (ref 70–110)
GLUCOSE SERPL-MCNC: 109 MG/DL (ref 70–110)
HCT VFR BLD AUTO: 25.2 % (ref 37–48.5)
HGB BLD-MCNC: 7.5 G/DL (ref 12–16)
IMM GRANULOCYTES # BLD AUTO: 0.02 K/UL (ref 0–0.04)
IMM GRANULOCYTES NFR BLD AUTO: 0.4 % (ref 0–0.5)
INTERVENTRICULAR SEPTUM: 0.92 CM (ref 0.6–1.1)
LA MAJOR: 5.96 CM
LA MINOR: 5.95 CM
LA WIDTH: 4.59 CM
LEFT ATRIUM SIZE: 2.79 CM
LEFT ATRIUM VOLUME INDEX: 36.2 ML/M2
LEFT ATRIUM VOLUME: 64.82 CM3
LEFT INTERNAL DIMENSION IN SYSTOLE: 2.63 CM (ref 2.1–4)
LEFT VENTRICLE DIASTOLIC VOLUME INDEX: 49.74 ML/M2
LEFT VENTRICLE DIASTOLIC VOLUME: 89.09 ML
LEFT VENTRICLE MASS INDEX: 73.9 G/M2
LEFT VENTRICLE SYSTOLIC VOLUME INDEX: 14.1 ML/M2
LEFT VENTRICLE SYSTOLIC VOLUME: 25.29 ML
LEFT VENTRICULAR INTERNAL DIMENSION IN DIASTOLE: 4.43 CM (ref 3.5–6)
LEFT VENTRICULAR MASS: 132.37 G
LV LATERAL E/E' RATIO: 11.67
LV SEPTAL E/E' RATIO: 17.5
LYMPHOCYTES # BLD AUTO: 1.1 K/UL (ref 1–4.8)
LYMPHOCYTES NFR BLD: 21.5 % (ref 18–48)
MAGNESIUM SERPL-MCNC: 2.2 MG/DL (ref 1.6–2.6)
MCH RBC QN AUTO: 24.5 PG (ref 27–31)
MCHC RBC AUTO-ENTMCNC: 29.8 G/DL (ref 32–36)
MCV RBC AUTO: 82 FL (ref 82–98)
MONOCYTES # BLD AUTO: 0.6 K/UL (ref 0.3–1)
MONOCYTES NFR BLD: 10.9 % (ref 4–15)
MV PEAK A VEL: 1.15 M/S
MV PEAK E VEL: 1.05 M/S
NEUTROPHILS # BLD AUTO: 3.5 K/UL (ref 1.8–7.7)
NEUTROPHILS NFR BLD: 65.5 % (ref 38–73)
NRBC BLD-RTO: 0 /100 WBC
PHOSPHATE SERPL-MCNC: 6.3 MG/DL (ref 2.7–4.5)
PISA TR MAX VEL: 2.93 M/S
PLATELET # BLD AUTO: 304 K/UL (ref 150–350)
PMV BLD AUTO: 12.1 FL (ref 9.2–12.9)
POTASSIUM SERPL-SCNC: 3.5 MMOL/L (ref 3.5–5.1)
POTASSIUM SERPL-SCNC: 3.6 MMOL/L (ref 3.5–5.1)
PROT SERPL-MCNC: 13.9 G/DL (ref 6–8.4)
PULM VEIN S/D RATIO: 1.32
PV PEAK D VEL: 0.44 M/S
PV PEAK S VEL: 0.58 M/S
RA MAJOR: 6.28 CM
RA PRESSURE: 15 MMHG
RA WIDTH: 4.03 CM
RBC # BLD AUTO: 3.06 M/UL (ref 4–5.4)
RIGHT VENTRICULAR END-DIASTOLIC DIMENSION: 4 CM
SINUS: 3.71 CM
SODIUM SERPL-SCNC: 133 MMOL/L (ref 136–145)
SODIUM SERPL-SCNC: 135 MMOL/L (ref 136–145)
STJ: 3.08 CM
TDI LATERAL: 0.09
TDI SEPTAL: 0.06
TDI: 0.08
TR MAX PG: 34.34 MMHG
TRICUSPID ANNULAR PLANE SYSTOLIC EXCURSION: 1.69 CM
TV REST PULMONARY ARTERY PRESSURE: 49 MMHG
WBC # BLD AUTO: 5.3 K/UL (ref 3.9–12.7)

## 2019-05-01 PROCEDURE — 63600175 PHARM REV CODE 636 W HCPCS: Mod: HCNC | Performed by: STUDENT IN AN ORGANIZED HEALTH CARE EDUCATION/TRAINING PROGRAM

## 2019-05-01 PROCEDURE — 80048 BASIC METABOLIC PNL TOTAL CA: CPT | Mod: HCNC

## 2019-05-01 PROCEDURE — 84100 ASSAY OF PHOSPHORUS: CPT | Mod: HCNC

## 2019-05-01 PROCEDURE — 82105 ALPHA-FETOPROTEIN SERUM: CPT | Mod: HCNC

## 2019-05-01 PROCEDURE — 20600001 HC STEP DOWN PRIVATE ROOM: Mod: HCNC

## 2019-05-01 PROCEDURE — 82378 CARCINOEMBRYONIC ANTIGEN: CPT | Mod: HCNC

## 2019-05-01 PROCEDURE — 27000221 HC OXYGEN, UP TO 24 HOURS: Mod: HCNC

## 2019-05-01 PROCEDURE — 99900035 HC TECH TIME PER 15 MIN (STAT): Mod: HCNC

## 2019-05-01 PROCEDURE — 99233 PR SUBSEQUENT HOSPITAL CARE,LEVL III: ICD-10-PCS | Mod: HCNC,GC,, | Performed by: INTERNAL MEDICINE

## 2019-05-01 PROCEDURE — 83735 ASSAY OF MAGNESIUM: CPT | Mod: HCNC

## 2019-05-01 PROCEDURE — 25000003 PHARM REV CODE 250: Mod: HCNC | Performed by: STUDENT IN AN ORGANIZED HEALTH CARE EDUCATION/TRAINING PROGRAM

## 2019-05-01 PROCEDURE — 99233 SBSQ HOSP IP/OBS HIGH 50: CPT | Mod: HCNC,GC,, | Performed by: INTERNAL MEDICINE

## 2019-05-01 PROCEDURE — 86301 IMMUNOASSAY TUMOR CA 19-9: CPT | Mod: HCNC

## 2019-05-01 PROCEDURE — 94799 UNLISTED PULMONARY SVC/PX: CPT | Mod: HCNC

## 2019-05-01 PROCEDURE — 80053 COMPREHEN METABOLIC PANEL: CPT | Mod: HCNC

## 2019-05-01 PROCEDURE — 36415 COLL VENOUS BLD VENIPUNCTURE: CPT | Mod: HCNC

## 2019-05-01 PROCEDURE — 86304 IMMUNOASSAY TUMOR CA 125: CPT | Mod: HCNC

## 2019-05-01 PROCEDURE — 85025 COMPLETE CBC W/AUTO DIFF WBC: CPT | Mod: HCNC

## 2019-05-01 RX ORDER — FUROSEMIDE 10 MG/ML
40 INJECTION INTRAMUSCULAR; INTRAVENOUS ONCE
Status: COMPLETED | OUTPATIENT
Start: 2019-05-01 | End: 2019-05-01

## 2019-05-01 RX ORDER — IPRATROPIUM BROMIDE AND ALBUTEROL SULFATE 2.5; .5 MG/3ML; MG/3ML
3 SOLUTION RESPIRATORY (INHALATION) EVERY 6 HOURS PRN
Status: DISCONTINUED | OUTPATIENT
Start: 2019-05-01 | End: 2019-05-08 | Stop reason: HOSPADM

## 2019-05-01 RX ORDER — DEXTROMETHORPHAN HYDROBROMIDE, GUAIFENESIN 5; 100 MG/5ML; MG/5ML
1300 LIQUID ORAL DAILY PRN
COMMUNITY
End: 2020-02-10

## 2019-05-01 RX ADMIN — ASPIRIN 81 MG CHEWABLE TABLET 81 MG: 81 TABLET CHEWABLE at 10:05

## 2019-05-01 RX ADMIN — GABAPENTIN 300 MG: 300 CAPSULE ORAL at 09:05

## 2019-05-01 RX ADMIN — GABAPENTIN 300 MG: 300 CAPSULE ORAL at 10:05

## 2019-05-01 RX ADMIN — ACETAMINOPHEN 650 MG: 325 TABLET ORAL at 09:05

## 2019-05-01 RX ADMIN — PANTOPRAZOLE SODIUM 40 MG: 40 TABLET, DELAYED RELEASE ORAL at 10:05

## 2019-05-01 RX ADMIN — SERTRALINE HYDROCHLORIDE 50 MG: 50 TABLET ORAL at 10:05

## 2019-05-01 RX ADMIN — AMLODIPINE BESYLATE 5 MG: 5 TABLET ORAL at 10:05

## 2019-05-01 RX ADMIN — FUROSEMIDE 40 MG: 10 INJECTION, SOLUTION INTRAMUSCULAR; INTRAVENOUS at 11:05

## 2019-05-01 NOTE — NURSING
Phone is not wking in room- no dial tone, called communications to assess .  Also bed alarm is not wking- called for another bed,

## 2019-05-01 NOTE — ASSESSMENT & PLAN NOTE
Previous smoker with 30-40 pack year history, L breast cancer s/p lumpectomy & RTX, 6 months ago she had a normal mammogram. She had a EGD back in 2016 which showed a nonbleeding angio ectasia.  A colonoscopy back in March 2015 showing a 5 mm hyperplastic polyp. Now with multiple new hepatic lesions concerning for metastatic disease. With new onset of intermittent confusion. MRI brain pending to evaluate possible mets to brain. MRI abdo/pelvis pending for further workup. Tumor marker labs pending.     Given progressive ACEVEDO with elevated est PAP, concern for ovarian origin.     CTPA 04/30/2019  Trace bilateral pleural fluid with adjacent little opacities, likely representing atelectatic changes.  Patchy ground-glass opacities throughout the lungs with possible peripheral reticulations.  These changes are nonspecific and may represent edema versus chronic lung changes.  Further evaluation with dedicated nonemergent conventional noncontrast enhanced chest CT is recommended. Possible compression # at T7     Liver u/s 04/04/2019  Innumerable new hepatic lesions concerning for metastatic disease.  Further evaluation with contrast-enhanced CT is recommended.  1.8 cm peripancreatic lymph node.    CT Abdomen/Pelvis 4/10/2019  Innumerable indeterminate enhancing lesions identified throughout the liver.  No definitive washout on delayed phases.  Findings are indeterminate by CT criteria, metastatic disease is not excluded.  Recommend further evaluation with MRI liver mass protocol.  Enhancing retroperitoneal soft tissue lesion, likely an enlarged retroperitoneal lymph node.  Findings are concerning for metastasis in the setting of occult malignancy.  Thickened endometrium, underlying neoplasm cannot.  Recommend further evaluation with pelvic ultrasound or MRI.  Indeterminate ill-defined hypoattenuating lesion in hepatic segment VI.  Indeterminate arterial enhancing lesion in the spleen.    Recent thickened endometrial  stripe  - was recently evaluated by Ob/gyn for incidental finding on CT studies did not warrant further work up due to lack of symptoms symptoms     She was evaluated by hepatology 3 years ago for mass found on abdominal CT. Per chart review it was a f/u for evaluation of elevated liver enzymes. She has undergone an extensive workup for her elevated liver enzymes. Serological workup was negative for Gino's, alpha-1 antitrypsin deficiency, hemochromatosis, autoimmune etiology, and viral hepatitis. Thyroid was normal. She had a CT scan to further evaluate her liver lesion seen on u/s. TPCT showed a focal 1.2-cm area of hypodensity identified within hepatic segment VI, which is not isodense on delayed phase imaging. Due its small size and imaging characteristics, it cannot be classified as a hemangioma and further follow-up, if clinically warranted, could be obtained with MRI in 3 to 6 months to document stability. Her AFP, CEA, CA 19-9, and  were all normal. She underwent u/s guided liver biopsy on 4/6/15 that revealed no significant abnormality to explain her elevated transaminases.  FINAL PATHOLOGIC DIAGNOSIS  LIVER (NEEDLE BIOPSY)  -Very minimal lobular disarray/injury  -Rare macrosteatosis, less than 2%  -No iron  -No fibrosis

## 2019-05-01 NOTE — H&P
[facility] Medicine  History & Physical    Patient Name: Shelby Thompson  MRN: [id number]  Admission Date: 4/30/2019  Attending Physician: Indira Rodrigez MD   Primary Care Provider: Emanuel Arevalo MD    [facility] Medicine Team: [facility] MED 5 Velma Joshi MD     Patient information was obtained from patient, spouse/SO, past medical records and ER records.     Subjective:     Principal Problem:Acute respiratory failure with hypoxia    Chief Complaint:   Chief Complaint   Patient presents with    Shortness of Breath     sent from IM clinic to rule out pe arrived on 2l nc        HPI: Ms. 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    She was seen by her PCP Dr. Arevalo this morning, who recorded an SaO2 of 87% and sent her to the ED for evaluation of PE. She is also reporting left flank and chest pain. She states that the pain feels like it's under her breast and wraps around her side to her back. She gets relief with tramadol and by sitting on the edge of her recliner chair at home. The pain is constant, but does not radiate. Palpation makes the pain worse. She denies any nausea, vomiting, diaphoresis or worsening SOB with increases in her pain. Patient reports that she used to be able to get around without an issue, but now gets so short of breath going from one room to another that she has to sit down.     Of note, she has a hx of CVA (residual deficits of weakness and tremor on right side). She uses a rollator to get around, and there is no acute change with this. Patient's  states that the PCP was also concerned about her recent CT  scan and wanted her to be admitted for continued work up of her abnormal CT scan. Patient denies any issues with bleeding, bruising, or LE edema.    6 months ago she had a normal mammogram. Will she had a EGD back in 2016 which showed a nonbleeding angio ectasia.  A colonoscopy back in March 2015 showing a 5 mm hyperplastic polyp     Liver u/s 04/04/2019  Innumerable new hepatic lesions concerning for metastatic disease.  Further evaluation with contrast-enhanced CT is recommended.  1.8 cm peripancreatic lymph node.    CT Abdomen/Pelvis 4/10/2019  Innumerable indeterminate enhancing lesions identified throughout the liver.  No definitive washout on delayed phases.  Findings are indeterminate by CT criteria, metastatic disease is not excluded.  Recommend further evaluation with MRI liver mass protocol.  Enhancing retroperitoneal soft tissue lesion, likely an enlarged retroperitoneal lymph node.  Findings are concerning for metastasis in the setting of occult malignancy.  Thickened endometrium, underlying neoplasm cannot.  Recommend further evaluation with pelvic ultrasound or MRI.  Indeterminate ill-defined hypoattenuating lesion in hepatic segment VI.  Indeterminate arterial enhancing lesion in the spleen.      She was evaluated by hepatology 3 years ago for mass found on abdominal CT. Per chart review it was a f/u for evaluation of elevated liver enzymes. She has undergone an extensive workup for her elevated liver enzymes. Serological workup was negative for Gino's, alpha-1 antitrypsin deficiency, hemochromatosis, autoimmune etiology, and viral hepatitis. Thyroid was normal. She had a CT scan to further evaluate her liver lesion seen on u/s. TPCT showed a focal 1.2-cm area of hypodensity identified within hepatic segment VI, which is not isodense on delayed phase imaging. Due its small size and imaging characteristics, it cannot be classified as a hemangioma and further follow-up, if clinically warranted, could  be obtained with MRI in 3 to 6 months to document stability. Her AFP, CEA, CA 19-9, and  were all normal. She underwent u/s guided liver biopsy on 4/6/15 that revealed no significant abnormality to explain her elevated transaminases.  FINAL PATHOLOGIC DIAGNOSIS  LIVER (NEEDLE BIOPSY)  -Very minimal lobular disarray/injury  -Rare macrosteatosis, less than 2%  -No iron  -No fibrosis  Supplemental Diagnosis  This addendum is issued for the results of ancillary studies.  PASD is negative. [place] red stain is negative for amyloid.  Keratin 7 is positive in bile ducts with no alterations (normal pattern staining).  All immunostain and special stain controls reacted appropriately.    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 7/21/2014    PAD (peripheral artery disease) 11/6/2015    Stroke 4/2011       Past Surgical History:   Procedure Laterality Date    APPENDECTOMY      BIOPSY LIVER AND ULTRASOUND N/A 4/6/2015    Performed by [facility] Provider at [facility] OR 2ND FLR    BREAST BIOPSY Left 10/2011    BREAST LUMPECTOMY Left 2011    DCIS    COLONOSCOPY      COLONOSCOPY N/A 3/26/2015    Performed by Wicho Woodard MD at [facility] ENDO (4TH FLR)    CYST REMOVAL      back    ESOPHAGOGASTRODUODENOSCOPY  07/2016    duodenal angioectasia    ESOPHAGOGASTRODUODENOSCOPY (EGD) N/A 7/27/2016    Performed by Malik Sanchez MD at [facility] ENDO (4TH FLR)    FOOT FRACTURE  SURGERY  10/2012    right foot, with R hallux valgus repair    FRACTURE SURGERY Right     broken toe repiar    HEMORRHOID SURGERY      LIVER BIOPSY      TUBAL LIGATION      UPPER GASTROINTESTINAL ENDOSCOPY         Review of patient's allergies indicates:   Allergen Reactions    Pravastatin Other (See Comments)     Itching, elevated cpk, muscle aches       No current facility-administered medications on file prior to encounter.      Current Outpatient Medications on File Prior to Encounter   Medication Sig    alirocumab (PRALUENT PEN) 75 mg/mL PnIj INJECT 75MG UNDER THE SKIN EVERY TWO WEEKS    aspirin 81 mg Tab Take 1 tablet by mouth Daily.    bisacodyl (DULCOLAX) 5 mg EC tablet Take 5 mg by mouth once daily.    ferrous gluconate 324 mg (37.5 mg iron) Tab TAKE 1 TABLET BY MOUTH 2 (TWO) TIMES DAILY WITH MEALS.    gabapentin (NEURONTIN) 300 MG capsule TAKE 1 CAPSULE (300 MG TOTAL) BY MOUTH 2 (TWO) TIMES DAILY.    losartan-hydrochlorothiazide 100-25 mg (HYZAAR) 100-25 mg per tablet TAKE 1 TABLET BY MOUTH ONCE DAILY.    omeprazole (PRILOSEC) 40 MG capsule TAKE 1 CAPSULE (40 MG TOTAL) BY MOUTH ONCE DAILY.    sertraline (ZOLOFT) 50 MG tablet TAKE 1 TABLET (50 MG TOTAL) BY MOUTH ONCE DAILY.    traMADol (ULTRAM) 50 mg tablet Take 1 tablet (50 mg total) by mouth 2 (two) times daily as needed for Pain.    vitamin D 1000 units Tab Take 185 mg by mouth once daily.    [DISCONTINUED] amLODIPine (NORVASC) 5 MG tablet Take 1 tablet (5 mg total) by mouth once daily.    amLODIPine (NORVASC) 5 MG tablet TAKE 1 TABLET BY MOUTH EVERY DAY     Family History     Problem Relation (Age of Onset)    Breast cancer Sister (62)    Cancer Sister (63), Father    Fibroids Daughter    Hypertension Daughter, Son, Maternal Grandmother    No Known Problems Sister, Mother, Brother, Brother, Maternal Aunt, Maternal Uncle, Paternal Aunt, Paternal Uncle, Maternal Grandfather, Paternal Grandmother, Paternal Grandfather        Tobacco Use     Smoking status: Former Smoker     Packs/day: 0.50     Years: 20.00     Pack years: 10.00     Types: Cigarettes     Last attempt to quit: 2011     Years since quittin.0    Smokeless tobacco: Former User     Quit date: 4/3/2011   Substance and Sexual Activity    Alcohol use: Yes     Comment: on occasion - one glass wine every 3 months    Drug use: No    Sexual activity: Yes     Partners: Male     Birth control/protection: Post-menopausal     Review of Systems   Constitutional: Negative for activity change, chills, fatigue, fever and unexpected weight change.   HENT: Negative for congestion.    Eyes: Negative for visual disturbance.   Respiratory: Positive for chest tightness. Negative for cough, choking, shortness of breath, wheezing and stridor.    Cardiovascular: Positive for chest pain. Negative for palpitations and leg swelling.   Gastrointestinal: Negative for abdominal distention, abdominal pain, blood in stool, constipation, diarrhea, nausea and vomiting.   Endocrine: Negative for polyuria.   Genitourinary: Negative for difficulty urinating, dysuria, flank pain, frequency, menstrual problem, pelvic pain, urgency, vaginal bleeding and vaginal discharge.   Musculoskeletal: Negative for arthralgias and gait problem.   Skin: Positive for pallor. Negative for rash and wound.   Neurological: Positive for speech difficulty. Negative for dizziness, tremors, seizures, facial asymmetry, weakness, numbness and headaches.   Hematological: Does not bruise/bleed easily.   Psychiatric/Behavioral: Positive for confusion. Negative for agitation, behavioral problems and decreased concentration.     Objective:     Vital Signs (Most Recent):  Temp: 98.2 °F (36.8 °C) (19 0957)  Pulse: 90 (19)  Resp: 20 (19)  BP: 120/63 (19)  SpO2: 96 % (19) Vital Signs (24h Range):  Temp:  [98.2 °F (36.8 °C)] 98.2 °F (36.8 °C)  Pulse:  [78-90] 90  Resp:  [15-20] 20  SpO2:  [87 %-99  %] 96 %  BP: (100-146)/(62-74) 120/63     Weight: 80.3 kg (177 lb)  Body mass index is 31.35 kg/m².    Physical Exam   Constitutional: She is oriented to person, place, and time. She appears well-developed and well-nourished. No distress.   Dysarthria with hesitancy in speech noted- residual effect from previous CVA    HENT:   Head: Normocephalic and atraumatic.   Eyes: Pupils are equal, round, and reactive to light. EOM are normal.   Neck: Normal range of motion. Neck supple. No JVD present. No thyromegaly present.   Cardiovascular: Normal rate, normal heart sounds and intact distal pulses. Exam reveals no gallop and no friction rub.   No murmur heard.  Pulmonary/Chest: Effort normal and breath sounds normal. No stridor. No respiratory distress. She has no wheezes. She has no rales. She exhibits no tenderness.   Crackles at R base    Abdominal: Soft. Bowel sounds are normal. She exhibits no distension. There is no tenderness. There is no guarding.   Musculoskeletal: Normal range of motion.   Neurological: She is alert and oriented to person, place, and time. She displays normal reflexes. No cranial nerve deficit or sensory deficit. She exhibits normal muscle tone. Coordination normal.   Residual intention tremor of R hand from previous CVA   Skin: Skin is warm and dry. Capillary refill takes less than 2 seconds. She is not diaphoretic.   Psychiatric: She has a normal mood and affect. Her behavior is normal. Judgment and thought content normal.         CRANIAL NERVES     CN III, IV, VI   Pupils are equal, round, and reactive to light.  Extraocular motions are normal.        Significant Labs: All pertinent labs within the past 24 hours have been reviewed.    Significant Imaging: I have reviewed and interpreted all pertinent imaging results/findings within the past 24 hours.    Assessment/Plan:     * Acute respiratory failure with hypoxia  Exact source unknown, history of diastolic dysfunction with elevated PAP,  currently undergoing w/u for underlying malignancy  CTPA negative for PE, ground glass opacities found - suggesting high resolution CT     Acute on chronic diastolic heart failure  Crackles heard on physical exam, postiive for orthopnea, progressive SOB, BNP 1,070,   -ED given 20mg IV Lasix  -Additional 40mg IV tonight  -Strict Is/Os  -Fluid & Na restriction & cardiac diet  -repeat TTE    TTE of 9/18 revealing   1 - Normal left ventricular systolic function (EF 55-60%).     2 - Wall motion abnormalities.     3 - Mildly enlarged ascending aorta.     4 - Impaired LV relaxation, elevated LAP (grade 2 diastolic dysfunction).     5 - Normal right ventricular systolic function .     6 - Mild mitral regurgitation.     7 - Mild to moderate tricuspid regurgitation.     8 - Pulmonary hypertension. The estimated PA systolic pressure is 41 mmHg.     Acute anemia  - reported melena during hospital admission  -Hb 7.1 with history of severe Fe deficiency on Fe supplements came up to 10s  -protonix po for now, will continue to monitor     PVC (premature ventricular contraction)  -irregularity in HR noted in physical exam  -initial ekg showed NSR but repeat revealed PVCs consistent with physical findings  -will monitor for now  -continue telemetry      Multiple lesions of metastatic malignancy  Previous smoker with 30-40 pack year history, L breast cancer s/p lumpectomy & RTX, 6 months ago she had a normal mammogram. She had a EGD back in 2016 which showed a nonbleeding angio ectasia.  A colonoscopy back in March 2015 showing a 5 mm hyperplastic polyp. Now with multiple new hepatic lesions concerning for metastatic disease. With new onset of intermittent confusion. MRI brain pending to evaluate possible mets to brain. MRI abdo/pelvis pending for further workup. Tumor marker labs pending.     Given progressive ACEVEDO with elevated est PAP, concern for ovarian origin.     CTPA 04/30/2019  Trace bilateral pleural fluid with adjacent  little opacities, likely representing atelectatic changes.  Patchy ground-glass opacities throughout the lungs with possible peripheral reticulations.  These changes are nonspecific and may represent edema versus chronic lung changes.  Further evaluation with dedicated nonemergent conventional noncontrast enhanced chest CT is recommended. Possible compression # at T7     Liver u/s 04/04/2019  Innumerable new hepatic lesions concerning for metastatic disease.  Further evaluation with contrast-enhanced CT is recommended.  1.8 cm peripancreatic lymph node.    CT Abdomen/Pelvis 4/10/2019  Innumerable indeterminate enhancing lesions identified throughout the liver.  No definitive washout on delayed phases.  Findings are indeterminate by CT criteria, metastatic disease is not excluded.  Recommend further evaluation with MRI liver mass protocol.  Enhancing retroperitoneal soft tissue lesion, likely an enlarged retroperitoneal lymph node.  Findings are concerning for metastasis in the setting of occult malignancy.  Thickened endometrium, underlying neoplasm cannot.  Recommend further evaluation with pelvic ultrasound or MRI.  Indeterminate ill-defined hypoattenuating lesion in hepatic segment VI.  Indeterminate arterial enhancing lesion in the spleen.    Recent thickened endometrial stripe  - was recently evaluated by Ob/gyn for incidental finding on CT studies did not warrant further work up due to lack of symptoms symptoms     She was evaluated by hepatology 3 years ago for mass found on abdominal CT. Per chart review it was a f/u for evaluation of elevated liver enzymes. She has undergone an extensive workup for her elevated liver enzymes. Serological workup was negative for Gion's, alpha-1 antitrypsin deficiency, hemochromatosis, autoimmune etiology, and viral hepatitis. Thyroid was normal. She had a CT scan to further evaluate her liver lesion seen on u/s. TPCT showed a focal 1.2-cm area of hypodensity identified  within hepatic segment VI, which is not isodense on delayed phase imaging. Due its small size and imaging characteristics, it cannot be classified as a hemangioma and further follow-up, if clinically warranted, could be obtained with MRI in 3 to 6 months to document stability. Her AFP, CEA, CA 19-9, and  were all normal. She underwent u/s guided liver biopsy on 4/6/15 that revealed no significant abnormality to explain her elevated transaminases.  FINAL PATHOLOGIC DIAGNOSIS  LIVER (NEEDLE BIOPSY)  -Very minimal lobular disarray/injury  -Rare macrosteatosis, less than 2%  -No iron  -No fibrosis      Budd-Chiari syndrome  -found on her problem list but I cannot find additional notes on this      Statin intolerance  She is intolerant to statins because of rhabdo, liver enzymes have resolved. She was seen and stated on alirocomab- and she is tolerating it well.    still elevated at 150 with HDL 43, LDL 79.6 . She is working on low-fat, low-cholesterol diet.      Essential hypertension  -home regimen of amlodipine 5mg, losartan-HCTZ 100-25mg  Holding in the setting of diuresis      Tremor  Residual effect of CVA      Hyperlipemia  -continuing ASA 81    Iron deficiency anemia   Hb of 7.1 on admit, type & screened, will monitor for overt bleeding    She has history of anemia, recent H and H has come up to   10.6 / 34  (1/2018 )  She is taking iron replacement. She had a colonoscopy in 2014 for colon polyps, EGD in 2012, and video capsule in Sept 2016: Gastritis.      - Multiple angioectasias without bleeding in the                         proximal small bowel.                        - A single angioectasia without bleeding in the                         ileum.  Gi recommended following up with H/H and continuing iron.  she does have some fatigue .   C-scope showed she had 1 polyp in 2015 and it was hyperplastic and she is due for a repeat in 2025.        VTE Risk Mitigation (From admission, onward)        Ordered      IP VTE HIGH RISK PATIENT  Once      04/30/19 1750     Place CRUZ hose  Until discontinued      04/30/19 7002             Velma Joshi MD  Department of Hospital Medicine   [facility]

## 2019-05-01 NOTE — SUBJECTIVE & OBJECTIVE
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 7/21/2014    PAD (peripheral artery disease) 11/6/2015    Stroke 4/2011       Past Surgical History:   Procedure Laterality Date    APPENDECTOMY      BIOPSY LIVER AND ULTRASOUND N/A 4/6/2015    Performed by [facility] Provider at [facility] OR 2ND FLR    BREAST BIOPSY Left 10/2011    BREAST LUMPECTOMY Left 2011    DCIS    COLONOSCOPY      COLONOSCOPY N/A 3/26/2015    Performed by Wicho Woodard MD at [facility] ENDO (4TH FLR)    CYST REMOVAL      back    ESOPHAGOGASTRODUODENOSCOPY  07/2016    duodenal angioectasia    ESOPHAGOGASTRODUODENOSCOPY (EGD) N/A 7/27/2016    Performed by Malki Sanchez MD at [facility] ENDO (4TH FLR)    FOOT FRACTURE SURGERY  10/2012    right foot, with R hallux valgus repair    FRACTURE SURGERY Right     broken toe repiar    HEMORRHOID SURGERY      LIVER BIOPSY      TUBAL LIGATION      UPPER GASTROINTESTINAL ENDOSCOPY         Review of patient's allergies indicates:   Allergen Reactions    Pravastatin Other (See Comments)     Itching, elevated cpk, muscle aches       No current facility-administered medications on file prior to encounter.      Current Outpatient Medications on File Prior to Encounter   Medication Sig    acetaminophen (TYLENOL) 650 MG TbSR Take 1,300 mg by mouth daily as needed (pain).    alirocumab (PRALUENT PEN) 75 mg/mL PnRadha INJECT 75MG UNDER THE SKIN EVERY TWO WEEKS     amLODIPine (NORVASC) 5 MG tablet TAKE 1 TABLET BY MOUTH EVERY DAY    aspirin 81 mg Tab Take 1 tablet by mouth Daily.    bisacodyl (DULCOLAX) 5 mg EC tablet Take 5 mg by mouth once daily.    ferrous gluconate 324 mg (37.5 mg iron) Tab TAKE 1 TABLET BY MOUTH 2 (TWO) TIMES DAILY WITH MEALS.    gabapentin (NEURONTIN) 300 MG capsule TAKE 1 CAPSULE (300 MG TOTAL) BY MOUTH 2 (TWO) TIMES DAILY.    losartan-hydrochlorothiazide 100-25 mg (HYZAAR) 100-25 mg per tablet TAKE 1 TABLET BY MOUTH ONCE DAILY.    omeprazole (PRILOSEC) 40 MG capsule TAKE 1 CAPSULE (40 MG TOTAL) BY MOUTH ONCE DAILY.    sertraline (ZOLOFT) 50 MG tablet TAKE 1 TABLET (50 MG TOTAL) BY MOUTH ONCE DAILY.    traMADol (ULTRAM) 50 mg tablet Take 1 tablet (50 mg total) by mouth 2 (two) times daily as needed for Pain.    vitamin D 1000 units Tab Take 185 mg by mouth once daily.     Family History     Problem Relation (Age of Onset)    Breast cancer Sister (62)    Cancer Sister (63), Father    Fibroids Daughter    Hypertension Daughter, Son, Maternal Grandmother    No Known Problems Sister, Mother, Brother, Brother, Maternal Aunt, Maternal Uncle, Paternal Aunt, Paternal Uncle, Maternal Grandfather, Paternal Grandmother, Paternal Grandfather        Tobacco Use    Smoking status: Former Smoker     Packs/day: 0.50     Years: 20.00     Pack years: 10.00     Types: Cigarettes     Last attempt to quit: 2011     Years since quittin.0    Smokeless tobacco: Former User     Quit date: 4/3/2011   Substance and Sexual Activity    Alcohol use: Yes     Comment: on occasion - one glass wine every 3 months    Drug use: No    Sexual activity: Yes     Partners: Male     Birth control/protection: Post-menopausal     Review of Systems   Constitutional: Negative for activity change, chills, fatigue, fever and unexpected weight change.   HENT: Negative for congestion.    Eyes: Positive for pain. Negative for visual disturbance.   Respiratory: Positive for  chest tightness. Negative for cough, choking, shortness of breath, wheezing and stridor.    Cardiovascular: Positive for chest pain. Negative for palpitations and leg swelling.   Gastrointestinal: Negative for abdominal distention, abdominal pain, blood in stool, constipation, diarrhea, nausea and vomiting.   Endocrine: Negative for polyuria.   Genitourinary: Negative for difficulty urinating, dysuria, flank pain, frequency, menstrual problem, pelvic pain, urgency, vaginal bleeding and vaginal discharge.   Musculoskeletal: Negative for arthralgias and gait problem.   Skin: Positive for pallor. Negative for rash and wound.   Neurological: Positive for speech difficulty. Negative for dizziness, tremors, seizures, facial asymmetry, weakness, numbness and headaches.   Hematological: Does not bruise/bleed easily.   Psychiatric/Behavioral: Positive for confusion. Negative for agitation, behavioral problems and decreased concentration.     Objective:     Vital Signs (Most Recent):  Temp: 98.3 °F (36.8 °C) (05/01/19 1617)  Pulse: 89 (05/01/19 1617)  Resp: 18 (05/01/19 1617)  BP: 109/60 (05/01/19 1617)  SpO2: 96 % (05/01/19 1617) Vital Signs (24h Range):  Temp:  [98.2 °F (36.8 °C)-98.4 °F (36.9 °C)] 98.3 °F (36.8 °C)  Pulse:  [] 89  Resp:  [16-20] 18  SpO2:  [85 %-96 %] 96 %  BP: (109-156)/(57-75) 109/60     Weight: 75.8 kg (167 lb)  Body mass index is 29.58 kg/m².    Physical Exam   Constitutional: She is oriented to person, place, and time. She appears well-developed and well-nourished. No distress.   Dysarthria with hesitancy in speech noted- residual effect from previous CVA    HENT:   Head: Normocephalic and atraumatic.   Eyes: Pupils are equal, round, and reactive to light. EOM are normal.   Neck: Normal range of motion. Neck supple. No JVD present. No thyromegaly present.   Cardiovascular: Normal rate, normal heart sounds and intact distal pulses. Exam reveals no gallop and no friction rub.   No murmur  heard.  Pulmonary/Chest: Effort normal and breath sounds normal. No stridor. No respiratory distress. She has no wheezes. She has no rales. She exhibits no tenderness.   Crackles at R base    Abdominal: Soft. Bowel sounds are normal. She exhibits no distension. There is no tenderness. There is no guarding.   Musculoskeletal: Normal range of motion.   Neurological: She is alert and oriented to person, place, and time. She displays normal reflexes. No cranial nerve deficit or sensory deficit. She exhibits normal muscle tone. Coordination normal.   Residual intention tremor of R hand from previous CVA   Skin: Skin is warm and dry. Capillary refill takes less than 2 seconds. She is not diaphoretic.   Psychiatric: She has a normal mood and affect. Her behavior is normal. Judgment and thought content normal.         CRANIAL NERVES     CN III, IV, VI   Pupils are equal, round, and reactive to light.  Extraocular motions are normal.        Significant Labs: All pertinent labs within the past 24 hours have been reviewed.    Significant Imaging: I have reviewed and interpreted all pertinent imaging results/findings within the past 24 hours.

## 2019-05-01 NOTE — MEDICAL/APP STUDENT
History     CC:  70 yo female with hx of breast cancer and HTN presenting to the ED with progressive SOB for 2 weeks duration.    HPI:  SOB started 2 weeks ago and progressively worsened. Pt went to Dr. Arevalo's clinic and was advised to go to the ED. The SOB worsened upon exertion and lying down, and relieved by rest and sitting up. She also reports  having had palpitations and occasional coughs with white mucus and runny nose starting 2 weeks ago. Pt thinks her SOB is due to recent stress from taking care of her sick . Pt also complains of occasional abdominal swelling and pain and left-sided flank pain.    Pt has a hx of stroke in 2008 and 2011. Pt denies chest pain, syncope, fever/chills, sore throat, hx of heart or lung disease. She was a long-term smoker (3 packs/day) for 20 years (quit in 2008).       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 7/21/2014    PAD (peripheral artery disease) 11/6/2015    Stroke 4/2011       Past Surgical History:   Procedure Laterality Date    APPENDECTOMY      BIOPSY LIVER AND ULTRASOUND N/A 4/6/2015    Performed by [facility] Diagnostic Provider at [facility] OR 2ND FLR    BREAST BIOPSY Left 10/2011    BREAST LUMPECTOMY Left 2011    DCIS    COLONOSCOPY      COLONOSCOPY N/A 3/26/2015    Performed by Wicho Woodard MD at [facility] ENDO (4TH FLR)    CYST REMOVAL      back    ESOPHAGOGASTRODUODENOSCOPY  07/2016    duodenal  angioectasia    ESOPHAGOGASTRODUODENOSCOPY (EGD) N/A 2016    Performed by Malik Sanchez MD at [facility] (4TH FLR)    FOOT FRACTURE SURGERY  10/2012    right foot, with R hallux valgus repair    FRACTURE SURGERY Right     broken toe repiar    HEMORRHOID SURGERY      LIVER BIOPSY      TUBAL LIGATION      UPPER GASTROINTESTINAL ENDOSCOPY         Family History   Problem Relation Age of Onset    No Known Problems Sister     Cancer Sister 63        Lung Cancer    No Known Problems Mother     Cancer Father     No Known Problems Brother     Hypertension Daughter     Fibroids Daughter         uterine    Hypertension Son     Breast cancer Sister 62    No Known Problems Brother     No Known Problems Maternal Aunt     No Known Problems Maternal Uncle     No Known Problems Paternal Aunt     No Known Problems Paternal Uncle     Hypertension Maternal Grandmother     No Known Problems Maternal Grandfather     No Known Problems Paternal Grandmother     No Known Problems Paternal Grandfather     Ovarian cancer Neg Hx     Colon cancer Neg Hx     Tremor Neg Hx     Amblyopia Neg Hx     Blindness Neg Hx     Cataracts Neg Hx     Diabetes Neg Hx     Glaucoma Neg Hx     Macular degeneration Neg Hx     Retinal detachment Neg Hx     Strabismus Neg Hx     Stroke Neg Hx     Thyroid disease Neg Hx     Esophageal cancer Neg Hx     Rectal cancer Neg Hx     Stomach cancer Neg Hx     Ulcerative colitis Neg Hx     Crohn's disease Neg Hx     Irritable bowel syndrome Neg Hx     Celiac disease Neg Hx        Social History     Tobacco Use    Smoking status: Former Smoker     Packs/day: 0.50     Years: 20.00     Pack years: 10.00     Types: Cigarettes     Last attempt to quit: 2011     Years since quittin.0    Smokeless tobacco: Former User     Quit date: 4/3/2011   Substance Use Topics    Alcohol use: Yes     Comment: on occasion - one glass wine every 3 months    Drug use: No  "      Review of Systems   Constitutional: Negative.  Negative for chills and fever.   HENT: Positive for rhinorrhea (clear). Negative for sore throat.    Eyes: Negative.    Respiratory: Positive for cough (white mucus) and shortness of breath.    Cardiovascular: Positive for palpitations. Negative for chest pain and leg swelling.   Gastrointestinal: Positive for abdominal distention and abdominal pain. Negative for constipation, diarrhea, nausea and vomiting.   Endocrine: Negative.    Genitourinary: Positive for flank pain (left). Negative for dysuria, frequency and hematuria.   Skin: Negative.    Allergic/Immunologic: Negative.    Neurological: Negative.  Negative for dizziness, tremors, seizures, syncope, speech difficulty and headaches.   Hematological: Negative.    Psychiatric/Behavioral: Negative.    All other systems reviewed and are negative.      Physical Exam   /75 (BP Location: Right arm, Patient Position: Lying)   Pulse 89   Temp 98.2 °F (36.8 °C) (Oral)   Resp 20   Ht 5' 3" (1.6 m)   Wt 75.8 kg (167 lb 1.7 oz)   LMP  (LMP Unknown)   SpO2 (!) 94%   Breastfeeding? No   BMI 29.60 kg/m²     Physical Exam    Constitutional: She appears well-developed and well-nourished.   Cannot speak in full sentences due to SOB.   HENT:   Head: Normocephalic and atraumatic.   Eyes: Conjunctivae and EOM are normal. Pupils are equal, round, and reactive to light.   Neck: Normal range of motion. Neck supple. No JVD present.   Cardiovascular: Bradycardia present.  Exam reveals gallop and S3. Exam reveals no friction rub.    No murmur heard.  Heart beats drop every 3rd beat.   Pulmonary/Chest: Accessory muscle usage present. She has decreased breath sounds. She has no wheezes. She has rales (R lower lung). She exhibits no tenderness.   Abdominal: She exhibits distension. There is tenderness (diffuse, worst on L side). There is guarding. There is no rebound and negative Dewey's sign.   Genitourinary: " "  Genitourinary Comments: Left-sided flank pain   Musculoskeletal: Normal range of motion. She exhibits no edema.   Neurological: She is alert and oriented to person, place, and time.   Psychiatric: She has a normal mood and affect. Her behavior is normal. Judgment and thought content normal.         ED Course     CTA chest:  negative for PE.   "Patchy ground-glass opacities throughout the lungs", may represent edema. Further evaluation with non-contrast CT.     CXR:  "Heart is enlarged.  Slight accentuation of the basilar interstitial markings identified.  No significant airspace consolidation or pleural effusion identified"     MRI brain w/o contrast:  "No evidence of acute infarction.  Scattered areas of susceptibility weighted artifact in the basal ganglia and thalami.  These may represent chronic micro-hemorrhages.  Susceptibility weighted artifact layering along the right tentorium.  Component of acute subdural blood be difficult to exclude.  Old micro-hemorrhage in the left high frontal lobe."      Assessment & Plan:    Differentials for SOB:  CHF, COPD, pneumonia, hyperthyroidism.    Differentials for ascites: Budd-Chiari, liver failure, cirrhosis,     Acute decompensated CHF:   - Sx of progressive SOB relieved by rest, orthopnea, S3 sound, and CXR shows evidence of HF: enlarged heart, interstitial markings.   - elevated BNP (1070) confirms diagnosis of CHF.  - treat with diuretic furosemide IV 20mg  - will add on BB metoprolol when pt is stable.    Multiple liver nodules concerning for metastasis:  - She was evaluated by Hepatology 3 years ago for elevated LFTs. Serological workup was negative for Gino's, alpha-1 antitrypsin deficiency, hemochromatosis, autoimmune etiology, viral hepatitis. Thyroid was normal. She had a CT scan to further evaluate her liver mass seen on US.  TPCT showed a focal 1.2cm area of hypodensity wthin hepatic segment VI, which is not isodense on delayed phase imaging. Due to its " small size and imaging characteristics, it cannot be classified as a hemangioma and further follow-up could be obtained with MRI in 3-6 months to document stability. Her AFP, CEA, CA 19-9 and  were all normal. She underwent US guided liver biopsy on 4/6/15 that revealed no significant abnormality to explain her elevated transaminases.  - Liver US 04/04/2019 showed innumerable new hepatic lesions concerning for metastatic disease.  Further evaluation with contrast-enhanced CT is recommended.  1.8cm peripancreatic lymph node.  - Previous smoker with 30-40 packs/year history, L breast cancer s/p lumpectomy & radiation therapy -- 6 months ago she had a normal mammogram.   - She had a EGD in 2016 which showed a nonbleeding angio ectasia.     - A colonoscopy in 2015 showed a 5mm hyperplastic polyp. Now with multiple new hepatic lesions concerning for metastatic disease.   - With new onset of intermittent confusion. MRI brain pending to evaluate possible mets to brain.   - MRI abdo/pelvis pending for further workup.   - Tumor marker labs pending.       CT Abdomen/Pelvis 4/6/2019  Innumerable indeterminate enhancing lesions identified throughout the liver. Recommend further evaluation with MRI liver mass protocol.  Enhancing retroperitoneal soft tissue lesion, likely an enlarged retroperitoneal lymph node.  Findings are concerning for metastasis in the setting of occult malignancy.  Recommend further evaluation with pelvic ultrasound or MRI.  Indeterminate ill-defined hypoattenuating lesion in hepatic segment VI.  Indeterminate arterial enhancing lesion in the spleen.     Recent thickened endometrial stripe  - was recently evaluated by Ob/gyn for incidental finding on CT studies did not warrant further work up due to lack of symptoms.     LIVER BIOPSY in April 2015:  -Very minimal lobular disarray/injury  -Rare macrosteatosis, less than 2%  -No iron  -No fibrosis      Iron-deficiency anemia  - Hb 7.5. MCV 81.  Type &  cross.  Transfuse if Hb < 7.   - She is taking iron replacement.   - She had a colonoscopy in 2014 for colon polyps, EGD in 2012, and video capsule in Sept 2016 found gastritis.       - GI recommended following up with H/H and continuing iron supplement.        PVC (premature ventricular contraction)  - irregularity in HR noted in physical exam  - initial ekg showed NSR but repeat revealed PVCs consistent with physical findings  - will monitor for now  - continue telemetry      Budd-Chiari syndrome:  - Sx of abdominal pain and ascites  - hx of stroke: thrombotic state  - anticoagulant  - continue home aspirin 81mg  - monitor LFT and PT, aPTT and INR      Hyperlipidemia, Statin intolerance  - She is intolerant to statins because of rhabdo, liver enzymes have resolved. She was seen and stated on alirocomab- and she is tolerating it well.    - still elevated at 150 with HDL 43, LDL 79.6 . She is working on low-fat, low-cholesterol diet.

## 2019-05-01 NOTE — PROGRESS NOTES
Patient transferred to the MRI bed,, O2 at 2L, O2 sensor applied,, placed in MRI,, tolerating well,, WCTM

## 2019-05-01 NOTE — ASSESSMENT & PLAN NOTE
-irregularity in HR noted in physical exam  -initial ekg showed NSR but repeat revealed PVCs consistent with physical findings  -will monitor for now  -continue telemetry

## 2019-05-01 NOTE — ASSESSMENT & PLAN NOTE
Hb of 7.1 on admit, type & screened, will monitor for overt bleeding    She has history of anemia, recent H and H has come up to   10.6 / 34  (1/2018 )  She is taking iron replacement. She had a colonoscopy in 2014 for colon polyps, EGD in 2012, and video capsule in Sept 2016: Gastritis.      - Multiple angioectasias without bleeding in the                         proximal small bowel.                        - A single angioectasia without bleeding in the                         ileum.  Gi recommended following up with H/H and continuing iron.  she does have some fatigue .   C-scope showed she had 1 polyp in 2015 and it was hyperplastic and she is due for a repeat in 2025.

## 2019-05-01 NOTE — PLAN OF CARE
Problem: Adult Inpatient Plan of Care  Goal: Plan of Care Review  Outcome: Revised  Pt free of falls/trauma/injuries.  Denies c/o SOB, CP, or discomfort.  Generalized skin remains CDI; trace edema noted.  Pt being diuresed with IVP Lasix; diuresing well. Pt remains on supplemental O2 for this shift. Pt had abd ultrasound and possible head CT; no results. Pt tolerating plan of care. Will continue to monitor.

## 2019-05-01 NOTE — HPI
Ms. 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    She was seen by her PCP Dr. Arevalo this morning, who recorded an SaO2 of 87% and sent her to the ED for evaluation of PE. She is also reporting left flank and chest pain. She states that the pain feels like it's under her breast and wraps around her side to her back. She gets relief with tramadol and by sitting on the edge of her recliner chair at home. The pain is constant, but does not radiate. Palpation makes the pain worse. She denies any nausea, vomiting, diaphoresis or worsening SOB with increases in her pain. Patient reports that she used to be able to get around without an issue, but now gets so short of breath going from one room to another that she has to sit down.     Of note, she has a hx of CVA (residual deficits of weakness and tremor on right side). She uses a rollator to get around, and there is no acute change with this. Patient's  states that the PCP was also concerned about her recent CT scan and wanted her to be admitted for continued work up of her abnormal CT scan. Patient denies any issues with bleeding, bruising, or LE edema.    6 months ago she had a normal mammogram. Will she had a EGD back in 2016 which showed a nonbleeding angio ectasia.  A colonoscopy back in March 2015 showing a 5 mm hyperplastic polyp     Liver u/s 04/04/2019  Innumerable new hepatic lesions concerning for metastatic disease.  Further evaluation with contrast-enhanced CT is recommended.  1.8 cm peripancreatic lymph node.    CT Abdomen/Pelvis 4/10/2019  Innumerable indeterminate enhancing lesions identified throughout  the liver.  No definitive washout on delayed phases.  Findings are indeterminate by CT criteria, metastatic disease is not excluded.  Recommend further evaluation with MRI liver mass protocol.  Enhancing retroperitoneal soft tissue lesion, likely an enlarged retroperitoneal lymph node.  Findings are concerning for metastasis in the setting of occult malignancy.  Thickened endometrium, underlying neoplasm cannot.  Recommend further evaluation with pelvic ultrasound or MRI.  Indeterminate ill-defined hypoattenuating lesion in hepatic segment VI.  Indeterminate arterial enhancing lesion in the spleen.      She was evaluated by hepatology 3 years ago for mass found on abdominal CT. Per chart review it was a f/u for evaluation of elevated liver enzymes. She has undergone an extensive workup for her elevated liver enzymes. Serological workup was negative for Gino's, alpha-1 antitrypsin deficiency, hemochromatosis, autoimmune etiology, and viral hepatitis. Thyroid was normal. She had a CT scan to further evaluate her liver lesion seen on u/s. TPCT showed a focal 1.2-cm area of hypodensity identified within hepatic segment VI, which is not isodense on delayed phase imaging. Due its small size and imaging characteristics, it cannot be classified as a hemangioma and further follow-up, if clinically warranted, could be obtained with MRI in 3 to 6 months to document stability. Her AFP, CEA, CA 19-9, and  were all normal. She underwent u/s guided liver biopsy on 4/6/15 that revealed no significant abnormality to explain her elevated transaminases.  FINAL PATHOLOGIC DIAGNOSIS  LIVER (NEEDLE BIOPSY)  -Very minimal lobular disarray/injury  -Rare macrosteatosis, less than 2%  -No iron  -No fibrosis  Supplemental Diagnosis  This addendum is issued for the results of ancillary studies.  PASD is negative. [place] red stain is negative for amyloid.  Keratin 7 is positive in bile ducts with no alterations (normal pattern  staining).  All immunostain and special stain controls reacted appropriately.

## 2019-05-01 NOTE — HOSPITAL COURSE
She was managed for HFpEF with diuresis and transitioned to room air. Her course was complicated by worsening MARTA, not improving on diuresis, nephrology consulted.   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 H/O was consulted for labs suggestive of MM, Bone marrow Bx suggestive 70-80% plasma cells. She required 1U of PRBC transfusion for Hb <7. MARTA has improved and symptomatically better, will discharge michaela home. She will follow up with Hem/Onc on 5/8/19, take allopurinol 300 mg. She will also follow up with Dr Mickey jennings PCP, Losartan-HCTZ held for MARTA and Hyperuricemia. Due to her CHF she will be sent home with Lasix 20 mg OD and adjusted accordingly. To resume rest of her home medications, activity as tolerated and regular diet.

## 2019-05-01 NOTE — ASSESSMENT & PLAN NOTE
She is intolerant to statins because of rhabdo, liver enzymes have resolved. She was seen and stated on alirocomab- and she is tolerating it well.    still elevated at 150 with HDL 43, LDL 79.6 . She is working on low-fat, low-cholesterol diet.

## 2019-05-01 NOTE — ASSESSMENT & PLAN NOTE
- reported melena during hospital admission  -Hb 7.1 with history of severe Fe deficiency on Fe supplements came up to 10s  -protonix po for now, will continue to monitor

## 2019-05-01 NOTE — PLAN OF CARE
05/01/19 1025   Discharge Assessment   Assessment Type Discharge Planning Assessment   Confirmed/corrected address and phone number on facesheet? Yes   Assessment information obtained from? Patient;Medical Record;Caregiver   Communicated expected length of stay with patient/caregiver no   Prior to hospitilization cognitive status: Alert/Oriented   Prior to hospitalization functional status: Independent;Assistive Equipment   Current cognitive status: Alert/Oriented   Current Functional Status: Independent;Assistive Equipment   Lives With spouse   Able to Return to Prior Arrangements yes   Is patient able to care for self after discharge? Yes   Who are your caregiver(s) and their phone number(s)?   (Moises Thompson (Spouse) [phone])   Readmission Within the Last 30 Days no previous admission in last 30 days   Patient currently being followed by outpatient case management? No   Patient currently receives any other outside agency services? No   Equipment Currently Used at Home rollator   Do you have any problems affording any of your prescribed medications? No   Is the patient taking medications as prescribed? yes   Does the patient have transportation home? Yes   Transportation Anticipated family or friend will provide   Does the patient receive services at the [facility]? No   Discharge Plan A Home with family   Discharge Plan B Home with family   DME Needed Upon Discharge  none   Patient/Family in Agreement with Plan yes   Readmission Questionnaire   Have you felt down, depressed, or hopeless? 1   Have you felt little interest or pleasure in doing things? 2

## 2019-05-01 NOTE — NURSING
Pt arrived to room 0324 from er dept w/ tele monitor on- . .  CTM department is aware new pt's arrival to floor.   Pt voice many complaints upon arrival to floor; primary nurse is aware of admit completed, care plan initiated-- pt has 1 black cell phone at bedside and a black rolling walker.  Informed pt that we are not responsible for any valuables left at the bedside. Fall risk band applied, and yellow socks.

## 2019-05-01 NOTE — PROGRESS NOTES
Pt escorted to ultrasound via stretcher for abd ultrasound.   Pre-procedure orders implemented as ordered.  Pt showing no S/S of distress; AAOx4.  Pt transported with telemetry.  Awaiting return.    1315  Pt from ultrasound via stretcher with escort.  Pt in no distress, resting comfortably in bed.  Post ___ orders implemented. Pt verbalizes understanding of plan of care.  Bed locked, in lowest position, siderails up x2.  Call bell in reach.  Pt instructed to call for assistance.  Will continue to monitor.

## 2019-05-01 NOTE — PROGRESS NOTES
Pt escorted to ultrasound via stretcher for abd ultrasound.   Pre-procedure orders implemented as ordered.  Pt showing no S/S of distress; AAOx4.  Pt transported with telemetry and oxygen.  Awaiting return.    1035  Pt from ultrasound via stretcher with escort.  Pt in no distress, resting comfortably in bed.  Pt verbalizes understanding of plan of care.  Bed locked, in lowest position, siderails up x2.  Call bell in reach.  Pt instructed to call for assistance.  Will continue to monitor.

## 2019-05-01 NOTE — ASSESSMENT & PLAN NOTE
Exact source unknown, history of diastolic dysfunction with elevated PAP, currently undergoing w/u for underlying malignancy  CTPA negative for PE, ground glass opacities found - suggesting high resolution CT   History of COPD

## 2019-05-01 NOTE — ASSESSMENT & PLAN NOTE
Crackles heard on physical exam, postiive for orthopnea, progressive SOB, BNP 1,070,   -ED given 20mg IV Lasix  -Additional 40mg IV tonight  -Strict Is/Os  -Fluid & Na restriction & cardiac diet  -repeat TTE    TTE of 9/18 revealing   1 - Normal left ventricular systolic function (EF 55-60%).     2 - Wall motion abnormalities.     3 - Mildly enlarged ascending aorta.     4 - Impaired LV relaxation, elevated LAP (grade 2 diastolic dysfunction).     5 - Normal right ventricular systolic function .     6 - Mild mitral regurgitation.     7 - Mild to moderate tricuspid regurgitation.     8 - Pulmonary hypertension. The estimated PA systolic pressure is 41 mmHg.

## 2019-05-01 NOTE — ASSESSMENT & PLAN NOTE
Crackles heard on physical exam, postiive for orthopnea, progressive SOB, BNP 1,070,   -holding Lasix today in setting of worsening FUENTES & clinically better  -Strict Is/Os & daily weights  -Fluid & Na restriction & cardiac diet  -repeat TTE    TTE of 9/18 revealing   1 - Normal left ventricular systolic function (EF 55-60%).     2 - Wall motion abnormalities.     3 - Mildly enlarged ascending aorta.     4 - Impaired LV relaxation, elevated LAP (grade 2 diastolic dysfunction).     5 - Normal right ventricular systolic function .     6 - Mild mitral regurgitation.     7 - Mild to moderate tricuspid regurgitation.     8 - Pulmonary hypertension. The estimated PA systolic pressure is 41 mmHg.

## 2019-05-01 NOTE — ASSESSMENT & PLAN NOTE
-Cr worsened after Lasix given with rise of BUN. Suspect from aggressive diuresis vs intrinsic renal injury   -retroperitoneal u/s revealing increased resistive indices and decreased perfusion bilaterally consistent with medical renal disease.  -will continue to monitor

## 2019-05-01 NOTE — SUBJECTIVE & OBJECTIVE
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 7/21/2014    PAD (peripheral artery disease) 11/6/2015    Stroke 4/2011       Past Surgical History:   Procedure Laterality Date    APPENDECTOMY      BIOPSY LIVER AND ULTRASOUND N/A 4/6/2015    Performed by [facility] OR 2ND FLR    BREAST BIOPSY Left 10/2011    BREAST LUMPECTOMY Left 2011    DCIS    COLONOSCOPY      COLONOSCOPY N/A 3/26/2015    Performed by Wicho Woodard MD at [facility] ENDO (4TH FLR)    CYST REMOVAL      back    ESOPHAGOGASTRODUODENOSCOPY  07/2016    duodenal angioectasia    ESOPHAGOGASTRODUODENOSCOPY (EGD) N/A 7/27/2016    Performed by Malik Sanchez MD at [facility] ENDO (4TH FLR)    FOOT FRACTURE SURGERY  10/2012    right foot, with R hallux valgus repair    FRACTURE SURGERY Right     broken toe repiar    HEMORRHOID SURGERY      LIVER BIOPSY      TUBAL LIGATION      UPPER GASTROINTESTINAL ENDOSCOPY         Review of patient's allergies indicates:   Allergen Reactions    Pravastatin Other (See Comments)     Itching, elevated cpk, muscle aches       No current facility-administered medications on file prior to encounter.      Current Outpatient Medications on File Prior to Encounter   Medication Sig    alirocumab (PRALUENT PEN) 75 mg/mL PnIj INJECT 75MG UNDER THE SKIN EVERY TWO WEEKS    aspirin 81 mg Tab Take 1 tablet by mouth Daily.    bisacodyl (DULCOLAX) 5 mg EC  tablet Take 5 mg by mouth once daily.    ferrous gluconate 324 mg (37.5 mg iron) Tab TAKE 1 TABLET BY MOUTH 2 (TWO) TIMES DAILY WITH MEALS.    gabapentin (NEURONTIN) 300 MG capsule TAKE 1 CAPSULE (300 MG TOTAL) BY MOUTH 2 (TWO) TIMES DAILY.    losartan-hydrochlorothiazide 100-25 mg (HYZAAR) 100-25 mg per tablet TAKE 1 TABLET BY MOUTH ONCE DAILY.    omeprazole (PRILOSEC) 40 MG capsule TAKE 1 CAPSULE (40 MG TOTAL) BY MOUTH ONCE DAILY.    sertraline (ZOLOFT) 50 MG tablet TAKE 1 TABLET (50 MG TOTAL) BY MOUTH ONCE DAILY.    traMADol (ULTRAM) 50 mg tablet Take 1 tablet (50 mg total) by mouth 2 (two) times daily as needed for Pain.    vitamin D 1000 units Tab Take 185 mg by mouth once daily.    [DISCONTINUED] amLODIPine (NORVASC) 5 MG tablet Take 1 tablet (5 mg total) by mouth once daily.    amLODIPine (NORVASC) 5 MG tablet TAKE 1 TABLET BY MOUTH EVERY DAY     Family History     Problem Relation (Age of Onset)    Breast cancer Sister (62)    Cancer Sister (63), Father    Fibroids Daughter    Hypertension Daughter, Son, Maternal Grandmother    No Known Problems Sister, Mother, Brother, Brother, Maternal Aunt, Maternal Uncle, Paternal Aunt, Paternal Uncle, Maternal Grandfather, Paternal Grandmother, Paternal Grandfather        Tobacco Use    Smoking status: Former Smoker     Packs/day: 0.50     Years: 20.00     Pack years: 10.00     Types: Cigarettes     Last attempt to quit: 2011     Years since quittin.0    Smokeless tobacco: Former User     Quit date: 4/3/2011   Substance and Sexual Activity    Alcohol use: Yes     Comment: on occasion - one glass wine every 3 months    Drug use: No    Sexual activity: Yes     Partners: Male     Birth control/protection: Post-menopausal     Review of Systems   Constitutional: Negative for activity change, chills, fatigue, fever and unexpected weight change.   HENT: Negative for congestion.    Eyes: Negative for visual disturbance.   Respiratory: Positive for chest  tightness. Negative for cough, choking, shortness of breath, wheezing and stridor.    Cardiovascular: Positive for chest pain. Negative for palpitations and leg swelling.   Gastrointestinal: Negative for abdominal distention, abdominal pain, blood in stool, constipation, diarrhea, nausea and vomiting.   Endocrine: Negative for polyuria.   Genitourinary: Negative for difficulty urinating, dysuria, flank pain, frequency, menstrual problem, pelvic pain, urgency, vaginal bleeding and vaginal discharge.   Musculoskeletal: Negative for arthralgias and gait problem.   Skin: Positive for pallor. Negative for rash and wound.   Neurological: Positive for speech difficulty. Negative for dizziness, tremors, seizures, facial asymmetry, weakness, numbness and headaches.   Hematological: Does not bruise/bleed easily.   Psychiatric/Behavioral: Positive for confusion. Negative for agitation, behavioral problems and decreased concentration.     Objective:     Vital Signs (Most Recent):  Temp: 98.2 °F (36.8 °C) (04/30/19 0957)  Pulse: 90 (04/30/19 2128)  Resp: 20 (04/30/19 2128)  BP: 120/63 (04/30/19 2128)  SpO2: 96 % (04/30/19 2128) Vital Signs (24h Range):  Temp:  [98.2 °F (36.8 °C)] 98.2 °F (36.8 °C)  Pulse:  [78-90] 90  Resp:  [15-20] 20  SpO2:  [87 %-99 %] 96 %  BP: (100-146)/(62-74) 120/63     Weight: 80.3 kg (177 lb)  Body mass index is 31.35 kg/m².    Physical Exam   Constitutional: She is oriented to person, place, and time. She appears well-developed and well-nourished. No distress.   Dysarthria with hesitancy in speech noted- residual effect from previous CVA    HENT:   Head: Normocephalic and atraumatic.   Eyes: Pupils are equal, round, and reactive to light. EOM are normal.   Neck: Normal range of motion. Neck supple. No JVD present. No thyromegaly present.   Cardiovascular: Normal rate, normal heart sounds and intact distal pulses. Exam reveals no gallop and no friction rub.   No murmur heard.  Pulmonary/Chest: Effort  normal and breath sounds normal. No stridor. No respiratory distress. She has no wheezes. She has no rales. She exhibits no tenderness.   Crackles at R base    Abdominal: Soft. Bowel sounds are normal. She exhibits no distension. There is no tenderness. There is no guarding.   Musculoskeletal: Normal range of motion.   Neurological: She is alert and oriented to person, place, and time. She displays normal reflexes. No cranial nerve deficit or sensory deficit. She exhibits normal muscle tone. Coordination normal.   Residual intention tremor of R hand from previous CVA   Skin: Skin is warm and dry. Capillary refill takes less than 2 seconds. She is not diaphoretic.   Psychiatric: She has a normal mood and affect. Her behavior is normal. Judgment and thought content normal.         CRANIAL NERVES     CN III, IV, VI   Pupils are equal, round, and reactive to light.  Extraocular motions are normal.        Significant Labs: All pertinent labs within the past 24 hours have been reviewed.    Significant Imaging: I have reviewed and interpreted all pertinent imaging results/findings within the past 24 hours.

## 2019-05-01 NOTE — PROGRESS NOTES
[facility] Hospital Medicine  Progress Note    Patient Name: Shelby Thompson  MRN: [id number]  Patient Class: IP- Inpatient   Admission Date: 4/30/2019  Length of Stay: 1 days  Attending Physician: Indira Rodrigez MD  Primary Care Provider: Emanuel Arevalo MD    [facility] Medicine Team: [facility] 5 Velma Joshi MD    Subjective:     Principal Problem:Acute respiratory failure with hypoxia    HPI:  Ms. 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    She was seen by her PCP Dr. Arevalo this morning, who recorded an SaO2 of 87% and sent her to the ED for evaluation of PE. She is also reporting left flank and chest pain. She states that the pain feels like it's under her breast and wraps around her side to her back. She gets relief with tramadol and by sitting on the edge of her recliner chair at home. The pain is constant, but does not radiate. Palpation makes the pain worse. She denies any nausea, vomiting, diaphoresis or worsening SOB with increases in her pain. Patient reports that she used to be able to get around without an issue, but now gets so short of breath going from one room to another that she has to sit down.     Of note, she has a hx of CVA (residual deficits of weakness and tremor on right side). She uses a rollator to get around, and there is no acute change with this. Patient's  states that the PCP was also concerned about her recent CT scan and wanted her to be admitted for continued work up of her abnormal CT scan. Patient denies any issues with bleeding, bruising, or LE edema.    6 months ago she had a normal mammogram. Will she  had a EGD back in 2016 which showed a nonbleeding angio ectasia.  A colonoscopy back in March 2015 showing a 5 mm hyperplastic polyp     Liver u/s 04/04/2019  Innumerable new hepatic lesions concerning for metastatic disease.  Further evaluation with contrast-enhanced CT is recommended.  1.8 cm peripancreatic lymph node.    CT Abdomen/Pelvis 4/10/2019  Innumerable indeterminate enhancing lesions identified throughout the liver.  No definitive washout on delayed phases.  Findings are indeterminate by CT criteria, metastatic disease is not excluded.  Recommend further evaluation with MRI liver mass protocol.  Enhancing retroperitoneal soft tissue lesion, likely an enlarged retroperitoneal lymph node.  Findings are concerning for metastasis in the setting of occult malignancy.  Thickened endometrium, underlying neoplasm cannot.  Recommend further evaluation with pelvic ultrasound or MRI.  Indeterminate ill-defined hypoattenuating lesion in hepatic segment VI.  Indeterminate arterial enhancing lesion in the spleen.      She was evaluated by hepatology 3 years ago for mass found on abdominal CT. Per chart review it was a f/u for evaluation of elevated liver enzymes. She has undergone an extensive workup for her elevated liver enzymes. Serological workup was negative for Gino's, alpha-1 antitrypsin deficiency, hemochromatosis, autoimmune etiology, and viral hepatitis. Thyroid was normal. She had a CT scan to further evaluate her liver lesion seen on u/s. TPCT showed a focal 1.2-cm area of hypodensity identified within hepatic segment VI, which is not isodense on delayed phase imaging. Due its small size and imaging characteristics, it cannot be classified as a hemangioma and further follow-up, if clinically warranted, could be obtained with MRI in 3 to 6 months to document stability. Her AFP, CEA, CA 19-9, and  were all normal. She underwent u/s guided liver biopsy on 4/6/15 that revealed no significant  abnormality to explain her elevated transaminases.  FINAL PATHOLOGIC DIAGNOSIS  LIVER (NEEDLE BIOPSY)  -Very minimal lobular disarray/injury  -Rare macrosteatosis, less than 2%  -No iron  -No fibrosis  Supplemental Diagnosis  This addendum is issued for the results of ancillary studies.  PASD is negative. [place] red stain is negative for amyloid.  Keratin 7 is positive in bile ducts with no alterations (normal pattern staining).  All immunostain and special stain controls reacted appropriately.    Hospital Course:  Interval Hx: NAEO, MRI brain showed possible artifact 2/2 hardware. FUENTES worsened suspect 2/2 lasix. On 1L oxygen this AM stil lwith R side crackles.     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 7/21/2014    PAD (peripheral artery disease) 11/6/2015    Stroke 4/2011       Past Surgical History:   Procedure Laterality Date    APPENDECTOMY      BIOPSY LIVER AND ULTRASOUND N/A 4/6/2015    Performed by [facility] at [facility] OR 2ND FLR    BREAST BIOPSY Left 10/2011    BREAST LUMPECTOMY Left 2011    DCIS    COLONOSCOPY      COLONOSCOPY N/A 3/26/2015    Performed by Wicho Woodard MD at [facility] ENDO (4TH FLR)    CYST REMOVAL      back    ESOPHAGOGASTRODUODENOSCOPY  07/2016    duodenal angioectasia    ESOPHAGOGASTRODUODENOSCOPY (EGD) N/A 7/27/2016    Performed by Malik Sanchez MD at [facility] ENDO (4TH FLR)    FOOT FRACTURE SURGERY  10/2012     right foot, with R hallux valgus repair    FRACTURE SURGERY Right     broken toe repiar    HEMORRHOID SURGERY      LIVER BIOPSY      TUBAL LIGATION      UPPER GASTROINTESTINAL ENDOSCOPY         Review of patient's allergies indicates:   Allergen Reactions    Pravastatin Other (See Comments)     Itching, elevated cpk, muscle aches       No current facility-administered medications on file prior to encounter.      Current Outpatient Medications on File Prior to Encounter   Medication Sig    acetaminophen (TYLENOL) 650 MG TbSR Take 1,300 mg by mouth daily as needed (pain).    alirocumab (PRALUENT PEN) 75 mg/mL PnIj INJECT 75MG UNDER THE SKIN EVERY TWO WEEKS    amLODIPine (NORVASC) 5 MG tablet TAKE 1 TABLET BY MOUTH EVERY DAY    aspirin 81 mg Tab Take 1 tablet by mouth Daily.    bisacodyl (DULCOLAX) 5 mg EC tablet Take 5 mg by mouth once daily.    ferrous gluconate 324 mg (37.5 mg iron) Tab TAKE 1 TABLET BY MOUTH 2 (TWO) TIMES DAILY WITH MEALS.    gabapentin (NEURONTIN) 300 MG capsule TAKE 1 CAPSULE (300 MG TOTAL) BY MOUTH 2 (TWO) TIMES DAILY.    losartan-hydrochlorothiazide 100-25 mg (HYZAAR) 100-25 mg per tablet TAKE 1 TABLET BY MOUTH ONCE DAILY.    omeprazole (PRILOSEC) 40 MG capsule TAKE 1 CAPSULE (40 MG TOTAL) BY MOUTH ONCE DAILY.    sertraline (ZOLOFT) 50 MG tablet TAKE 1 TABLET (50 MG TOTAL) BY MOUTH ONCE DAILY.    traMADol (ULTRAM) 50 mg tablet Take 1 tablet (50 mg total) by mouth 2 (two) times daily as needed for Pain.    vitamin D 1000 units Tab Take 185 mg by mouth once daily.     Family History     Problem Relation (Age of Onset)    Breast cancer Sister (62)    Cancer Sister (63), Father    Fibroids Daughter    Hypertension Daughter, Son, Maternal Grandmother    No Known Problems Sister, Mother, Brother, Brother, Maternal Aunt, Maternal Uncle, Paternal Aunt, Paternal Uncle, Maternal Grandfather, Paternal Grandmother, Paternal Grandfather        Tobacco Use    Smoking status: Former Smoker      Packs/day: 0.50     Years: 20.00     Pack years: 10.00     Types: Cigarettes     Last attempt to quit: 2011     Years since quittin.0    Smokeless tobacco: Former User     Quit date: 4/3/2011   Substance and Sexual Activity    Alcohol use: Yes     Comment: on occasion - one glass wine every 3 months    Drug use: No    Sexual activity: Yes     Partners: Male     Birth control/protection: Post-menopausal     Review of Systems   Constitutional: Negative for activity change, chills, fatigue, fever and unexpected weight change.   HENT: Negative for congestion.    Eyes: Positive for pain. Negative for visual disturbance.   Respiratory: Positive for chest tightness. Negative for cough, choking, shortness of breath, wheezing and stridor.    Cardiovascular: Positive for chest pain. Negative for palpitations and leg swelling.   Gastrointestinal: Negative for abdominal distention, abdominal pain, blood in stool, constipation, diarrhea, nausea and vomiting.   Endocrine: Negative for polyuria.   Genitourinary: Negative for difficulty urinating, dysuria, flank pain, frequency, menstrual problem, pelvic pain, urgency, vaginal bleeding and vaginal discharge.   Musculoskeletal: Negative for arthralgias and gait problem.   Skin: Positive for pallor. Negative for rash and wound.   Neurological: Positive for speech difficulty. Negative for dizziness, tremors, seizures, facial asymmetry, weakness, numbness and headaches.   Hematological: Does not bruise/bleed easily.   Psychiatric/Behavioral: Positive for confusion. Negative for agitation, behavioral problems and decreased concentration.     Objective:     Vital Signs (Most Recent):  Temp: 98.3 °F (36.8 °C) (19)  Pulse: 89 (19)  Resp: 18 (19)  BP: 109/60 (19)  SpO2: 96 % (19) Vital Signs (24h Range):  Temp:  [98.2 °F (36.8 °C)-98.4 °F (36.9 °C)] 98.3 °F (36.8 °C)  Pulse:  [] 89  Resp:  [16-20] 18  SpO2:  [85  %-96 %] 96 %  BP: (109-156)/(57-75) 109/60     Weight: 75.8 kg (167 lb)  Body mass index is 29.58 kg/m².    Physical Exam   Constitutional: She is oriented to person, place, and time. She appears well-developed and well-nourished. No distress.   Dysarthria with hesitancy in speech noted- residual effect from previous CVA    HENT:   Head: Normocephalic and atraumatic.   Eyes: Pupils are equal, round, and reactive to light. EOM are normal.   Neck: Normal range of motion. Neck supple. No JVD present. No thyromegaly present.   Cardiovascular: Normal rate, normal heart sounds and intact distal pulses. Exam reveals no gallop and no friction rub.   No murmur heard.  Pulmonary/Chest: Effort normal and breath sounds normal. No stridor. No respiratory distress. She has no wheezes. She has no rales. She exhibits no tenderness.   Crackles at R base    Abdominal: Soft. Bowel sounds are normal. She exhibits no distension. There is no tenderness. There is no guarding.   Musculoskeletal: Normal range of motion.   Neurological: She is alert and oriented to person, place, and time. She displays normal reflexes. No cranial nerve deficit or sensory deficit. She exhibits normal muscle tone. Coordination normal.   Residual intention tremor of R hand from previous CVA   Skin: Skin is warm and dry. Capillary refill takes less than 2 seconds. She is not diaphoretic.   Psychiatric: She has a normal mood and affect. Her behavior is normal. Judgment and thought content normal.         CRANIAL NERVES     CN III, IV, VI   Pupils are equal, round, and reactive to light.  Extraocular motions are normal.        Significant Labs: All pertinent labs within the past 24 hours have been reviewed.    Significant Imaging: I have reviewed and interpreted all pertinent imaging results/findings within the past 24 hours.    Assessment/Plan:      * Acute respiratory failure with hypoxia  Exact source unknown, history of diastolic dysfunction with elevated PAP,  currently undergoing w/u for underlying malignancy  CTPA negative for PE, ground glass opacities found - suggesting high resolution CT   History of COPD    Acute on chronic diastolic heart failure  Crackles heard on physical exam, postiive for orthopnea, progressive SOB, BNP 1,070,   -holding Lasix today in setting of worsening FUENTES & clinically better  -Strict Is/Os & daily weights  -Fluid & Na restriction & cardiac diet  -repeat TTE    TTE of 9/18 revealing   1 - Normal left ventricular systolic function (EF 55-60%).     2 - Wall motion abnormalities.     3 - Mildly enlarged ascending aorta.     4 - Impaired LV relaxation, elevated LAP (grade 2 diastolic dysfunction).     5 - Normal right ventricular systolic function .     6 - Mild mitral regurgitation.     7 - Mild to moderate tricuspid regurgitation.     8 - Pulmonary hypertension. The estimated PA systolic pressure is 41 mmHg.     Acute anemia  - reported melena during hospital admission  -Hb 7.1 with history of severe Fe deficiency on Fe supplements came up to 10s  -protonix po for now, will continue to monitor     FUENTES (acute kidney injury)  -Cr worsened after Lasix given with rise of BUN. Suspect from aggressive diuresis vs intrinsic renal injury   -retroperitoneal u/s revealing increased resistive indices and decreased perfusion bilaterally consistent with medical renal disease.  -will continue to monitor    PVC (premature ventricular contraction)  -irregularity in HR noted in physical exam  -initial ekg showed NSR but repeat revealed PVCs consistent with physical findings  -will monitor for now  -continue telemetry      Multiple lesions of metastatic malignancy  Previous smoker with 30-40 pack year history, L breast cancer s/p lumpectomy & RTX, 6 months ago she had a normal mammogram. She had a EGD back in 2016 which showed a nonbleeding angio ectasia.  A colonoscopy back in March 2015 showing a 5 mm hyperplastic polyp. Now with multiple new hepatic lesions  concerning for metastatic disease. With new onset of intermittent confusion. MRI brain pending to evaluate possible mets to brain. MRI abdo/pelvis pending for further workup. Tumor marker labs pending.     Given progressive ACEVEDO with elevated est PAP, concern for ovarian origin.     CTPA 04/30/2019  Trace bilateral pleural fluid with adjacent little opacities, likely representing atelectatic changes.  Patchy ground-glass opacities throughout the lungs with possible peripheral reticulations.  These changes are nonspecific and may represent edema versus chronic lung changes.  Further evaluation with dedicated nonemergent conventional noncontrast enhanced chest CT is recommended. Possible compression # at T7     Liver u/s 04/04/2019  Innumerable new hepatic lesions concerning for metastatic disease.  Further evaluation with contrast-enhanced CT is recommended.  1.8 cm peripancreatic lymph node.    CT Abdomen/Pelvis 4/10/2019  Innumerable indeterminate enhancing lesions identified throughout the liver.  No definitive washout on delayed phases.  Findings are indeterminate by CT criteria, metastatic disease is not excluded.  Recommend further evaluation with MRI liver mass protocol.  Enhancing retroperitoneal soft tissue lesion, likely an enlarged retroperitoneal lymph node.  Findings are concerning for metastasis in the setting of occult malignancy.  Thickened endometrium, underlying neoplasm cannot.  Recommend further evaluation with pelvic ultrasound or MRI.  Indeterminate ill-defined hypoattenuating lesion in hepatic segment VI.  Indeterminate arterial enhancing lesion in the spleen.    Recent thickened endometrial stripe  - was recently evaluated by Ob/gyn for incidental finding on CT studies did not warrant further work up due to lack of symptoms symptoms     She was evaluated by hepatology 3 years ago for mass found on abdominal CT. Per chart review it was a f/u for evaluation of elevated liver enzymes. She has  undergone an extensive workup for her elevated liver enzymes. Serological workup was negative for Gino's, alpha-1 antitrypsin deficiency, hemochromatosis, autoimmune etiology, and viral hepatitis. Thyroid was normal. She had a CT scan to further evaluate her liver lesion seen on u/s. TPCT showed a focal 1.2-cm area of hypodensity identified within hepatic segment VI, which is not isodense on delayed phase imaging. Due its small size and imaging characteristics, it cannot be classified as a hemangioma and further follow-up, if clinically warranted, could be obtained with MRI in 3 to 6 months to document stability. Her AFP, CEA, CA 19-9, and  were all normal. She underwent u/s guided liver biopsy on 4/6/15 that revealed no significant abnormality to explain her elevated transaminases.  FINAL PATHOLOGIC DIAGNOSIS  LIVER (NEEDLE BIOPSY)  -Very minimal lobular disarray/injury  -Rare macrosteatosis, less than 2%  -No iron  -No fibrosis      Budd-Chiari syndrome  -found on her problem list but I cannot find additional notes on this      Statin intolerance  She is intolerant to statins because of rhabdo, liver enzymes have resolved. She was seen and stated on alirocomab- and she is tolerating it well.    still elevated at 150 with HDL 43, LDL 79.6 . She is working on low-fat, low-cholesterol diet.      Essential hypertension  -home regimen of amlodipine 5mg, losartan-HCTZ 100-25mg  Holding in the setting of diuresis      Left ventricular diastolic dysfunction with preserved systolic function        Tremor  Residual effect of CVA      Hyperlipemia  -continuing ASA 81    Iron deficiency anemia   Hb of 7.1 on admit, type & screened, will monitor for overt bleeding    She has history of anemia, recent H and H has come up to   10.6 / 34  (1/2018 )  She is taking iron replacement. She had a colonoscopy in 2014 for colon polyps, EGD in 2012, and video capsule in Sept 2016: Gastritis.      - Multiple angioectasias without  bleeding in the                         proximal small bowel.                        - A single angioectasia without bleeding in the                         ileum.  Gi recommended following up with H/H and continuing iron.  she does have some fatigue .   C-scope showed she had 1 polyp in 2015 and it was hyperplastic and she is due for a repeat in 2025.        VTE Risk Mitigation (From admission, onward)        Ordered     IP VTE HIGH RISK PATIENT  Once      04/30/19 1750     Place CRUZ hose  Until discontinued      04/30/19 1515              Velma Joshi MD  Department of Hospital Medicine   [facility]

## 2019-05-01 NOTE — ASSESSMENT & PLAN NOTE
Exact source unknown, history of diastolic dysfunction with elevated PAP, currently undergoing w/u for underlying malignancy  CTPA negative for PE, ground glass opacities found - suggesting high resolution CT

## 2019-05-02 PROBLEM — D47.2 GAMMOPATHY: Status: ACTIVE | Noted: 2019-05-02

## 2019-05-02 LAB
ALBUMIN SERPL BCP-MCNC: 2.5 G/DL (ref 3.5–5.2)
ALP SERPL-CCNC: 60 U/L (ref 55–135)
ALT SERPL W/O P-5'-P-CCNC: 12 U/L (ref 10–44)
AMORPH CRY UR QL COMP ASSIST: ABNORMAL
ANION GAP SERPL CALC-SCNC: 8 MMOL/L (ref 8–16)
AST SERPL-CCNC: 14 U/L (ref 10–40)
B2 MICROGLOB SERPL-MCNC: 17.5 UG/ML (ref 0–2.5)
BACTERIA #/AREA URNS AUTO: ABNORMAL /HPF
BASOPHILS # BLD AUTO: 0.02 K/UL (ref 0–0.2)
BASOPHILS # BLD AUTO: 0.03 K/UL (ref 0–0.2)
BASOPHILS NFR BLD: 0.4 % (ref 0–1.9)
BASOPHILS NFR BLD: 0.5 % (ref 0–1.9)
BILIRUB SERPL-MCNC: 0.3 MG/DL (ref 0.1–1)
BILIRUB UR QL STRIP: ABNORMAL
BUN SERPL-MCNC: 29 MG/DL (ref 8–23)
CALCIUM SERPL-MCNC: 9.4 MG/DL (ref 8.7–10.5)
CAOX CRY UR QL COMP ASSIST: ABNORMAL
CHLORIDE SERPL-SCNC: 99 MMOL/L (ref 95–110)
CLARITY UR REFRACT.AUTO: ABNORMAL
CO2 SERPL-SCNC: 28 MMOL/L (ref 23–29)
COLOR UR AUTO: YELLOW
CREAT SERPL-MCNC: 2.7 MG/DL (ref 0.5–1.4)
CREAT UR-MCNC: 384 MG/DL (ref 15–325)
DIFFERENTIAL METHOD: ABNORMAL
DIFFERENTIAL METHOD: ABNORMAL
EOSINOPHIL # BLD AUTO: 0.1 K/UL (ref 0–0.5)
EOSINOPHIL # BLD AUTO: 0.1 K/UL (ref 0–0.5)
EOSINOPHIL NFR BLD: 2 % (ref 0–8)
EOSINOPHIL NFR BLD: 2.4 % (ref 0–8)
ERYTHROCYTE [DISTWIDTH] IN BLOOD BY AUTOMATED COUNT: 19.1 % (ref 11.5–14.5)
ERYTHROCYTE [DISTWIDTH] IN BLOOD BY AUTOMATED COUNT: 19.3 % (ref 11.5–14.5)
EST. GFR  (AFRICAN AMERICAN): 19.8 ML/MIN/1.73 M^2
EST. GFR  (NON AFRICAN AMERICAN): 17.2 ML/MIN/1.73 M^2
FOLATE SERPL-MCNC: 9.9 NG/ML (ref 4–24)
GLUCOSE SERPL-MCNC: 100 MG/DL (ref 70–110)
GLUCOSE UR QL STRIP: NEGATIVE
HAPTOGLOB SERPL-MCNC: 111 MG/DL (ref 30–250)
HAV IGM SERPL QL IA: NEGATIVE
HBV CORE AB SERPL QL IA: NEGATIVE
HBV CORE IGM SERPL QL IA: NEGATIVE
HBV SURFACE AG SERPL QL IA: NEGATIVE
HBV SURFACE AG SERPL QL IA: NEGATIVE
HCT VFR BLD AUTO: 23.7 % (ref 37–48.5)
HCT VFR BLD AUTO: 25.5 % (ref 37–48.5)
HCV AB SERPL QL IA: NEGATIVE
HCV AB SERPL QL IA: NEGATIVE
HGB BLD-MCNC: 7.3 G/DL (ref 12–16)
HGB BLD-MCNC: 7.9 G/DL (ref 12–16)
HGB UR QL STRIP: ABNORMAL
HIV1+2 IGG SERPL QL IA.RAPID: NEGATIVE
HYALINE CASTS UR QL AUTO: <1 /LPF
IGA SERPL-MCNC: 28 MG/DL (ref 40–350)
IGG SERPL-MCNC: 9028 MG/DL (ref 650–1600)
IGM SERPL-MCNC: <5 MG/DL (ref 50–300)
IMM GRANULOCYTES # BLD AUTO: 0.03 K/UL (ref 0–0.04)
IMM GRANULOCYTES # BLD AUTO: 0.04 K/UL (ref 0–0.04)
IMM GRANULOCYTES NFR BLD AUTO: 0.6 % (ref 0–0.5)
IMM GRANULOCYTES NFR BLD AUTO: 0.7 % (ref 0–0.5)
KETONES UR QL STRIP: NEGATIVE
LDH SERPL L TO P-CCNC: 106 U/L (ref 110–260)
LEUKOCYTE ESTERASE UR QL STRIP: ABNORMAL
LYMPHOCYTES # BLD AUTO: 0.9 K/UL (ref 1–4.8)
LYMPHOCYTES # BLD AUTO: 1.2 K/UL (ref 1–4.8)
LYMPHOCYTES NFR BLD: 18.5 % (ref 18–48)
LYMPHOCYTES NFR BLD: 20.6 % (ref 18–48)
MAGNESIUM SERPL-MCNC: 2 MG/DL (ref 1.6–2.6)
MCH RBC QN AUTO: 24.5 PG (ref 27–31)
MCH RBC QN AUTO: 24.6 PG (ref 27–31)
MCHC RBC AUTO-ENTMCNC: 30.8 G/DL (ref 32–36)
MCHC RBC AUTO-ENTMCNC: 31 G/DL (ref 32–36)
MCV RBC AUTO: 79 FL (ref 82–98)
MCV RBC AUTO: 80 FL (ref 82–98)
MICROSCOPIC COMMENT: ABNORMAL
MONOCYTES # BLD AUTO: 0.7 K/UL (ref 0.3–1)
MONOCYTES # BLD AUTO: 0.7 K/UL (ref 0.3–1)
MONOCYTES NFR BLD: 11.1 % (ref 4–15)
MONOCYTES NFR BLD: 13.5 % (ref 4–15)
NEUTROPHILS # BLD AUTO: 3.2 K/UL (ref 1.8–7.7)
NEUTROPHILS # BLD AUTO: 3.8 K/UL (ref 1.8–7.7)
NEUTROPHILS NFR BLD: 64.6 % (ref 38–73)
NEUTROPHILS NFR BLD: 65.1 % (ref 38–73)
NITRITE UR QL STRIP: NEGATIVE
NON-SQ EPI CELLS #/AREA URNS AUTO: 1 /HPF
NRBC BLD-RTO: 0 /100 WBC
NRBC BLD-RTO: 0 /100 WBC
PH UR STRIP: 6 [PH] (ref 5–8)
PHOSPHATE SERPL-MCNC: 6.7 MG/DL (ref 2.7–4.5)
PLATELET # BLD AUTO: 207 K/UL (ref 150–350)
PLATELET # BLD AUTO: 247 K/UL (ref 150–350)
PMV BLD AUTO: 10.9 FL (ref 9.2–12.9)
PMV BLD AUTO: 9.9 FL (ref 9.2–12.9)
POTASSIUM SERPL-SCNC: 3.3 MMOL/L (ref 3.5–5.1)
PROT SERPL-MCNC: 12.8 G/DL (ref 6–8.4)
PROT UR QL STRIP: ABNORMAL
RBC # BLD AUTO: 2.97 M/UL (ref 4–5.4)
RBC # BLD AUTO: 3.22 M/UL (ref 4–5.4)
RBC #/AREA URNS AUTO: 1 /HPF (ref 0–4)
RETICS/RBC NFR AUTO: 1 % (ref 0.5–2.5)
SODIUM SERPL-SCNC: 135 MMOL/L (ref 136–145)
SODIUM UR-SCNC: 26 MMOL/L (ref 20–250)
SP GR UR STRIP: 1.03 (ref 1–1.03)
SQUAMOUS #/AREA URNS AUTO: 1 /HPF
URATE SERPL-MCNC: 11.8 MG/DL (ref 2.4–5.7)
URN SPEC COLLECT METH UR: ABNORMAL
UUN UR-MCNC: 604 MG/DL (ref 140–1050)
VIT B12 SERPL-MCNC: 579 PG/ML (ref 210–950)
WBC # BLD AUTO: 4.98 K/UL (ref 3.9–12.7)
WBC # BLD AUTO: 5.87 K/UL (ref 3.9–12.7)
WBC #/AREA URNS AUTO: 15 /HPF (ref 0–5)

## 2019-05-02 PROCEDURE — 87086 URINE CULTURE/COLONY COUNT: CPT | Mod: HCNC

## 2019-05-02 PROCEDURE — 83010 ASSAY OF HAPTOGLOBIN QUANT: CPT | Mod: HCNC

## 2019-05-02 PROCEDURE — 99233 SBSQ HOSP IP/OBS HIGH 50: CPT | Mod: HCNC,GC,, | Performed by: INTERNAL MEDICINE

## 2019-05-02 PROCEDURE — 85045 AUTOMATED RETICULOCYTE COUNT: CPT | Mod: HCNC

## 2019-05-02 PROCEDURE — 82232 ASSAY OF BETA-2 PROTEIN: CPT | Mod: HCNC

## 2019-05-02 PROCEDURE — 25000003 PHARM REV CODE 250: Mod: HCNC | Performed by: STUDENT IN AN ORGANIZED HEALTH CARE EDUCATION/TRAINING PROGRAM

## 2019-05-02 PROCEDURE — 81001 URINALYSIS AUTO W/SCOPE: CPT | Mod: HCNC

## 2019-05-02 PROCEDURE — 84165 PATHOLOGIST INTERPRETATION SPE: ICD-10-PCS | Mod: 26,HCNC,, | Performed by: PATHOLOGY

## 2019-05-02 PROCEDURE — 84100 ASSAY OF PHOSPHORUS: CPT | Mod: HCNC

## 2019-05-02 PROCEDURE — 83735 ASSAY OF MAGNESIUM: CPT | Mod: HCNC

## 2019-05-02 PROCEDURE — 82570 ASSAY OF URINE CREATININE: CPT | Mod: HCNC

## 2019-05-02 PROCEDURE — 86704 HEP B CORE ANTIBODY TOTAL: CPT | Mod: HCNC

## 2019-05-02 PROCEDURE — 80074 ACUTE HEPATITIS PANEL: CPT | Mod: HCNC

## 2019-05-02 PROCEDURE — 84540 ASSAY OF URINE/UREA-N: CPT | Mod: HCNC

## 2019-05-02 PROCEDURE — 84165 PROTEIN E-PHORESIS SERUM: CPT | Mod: HCNC

## 2019-05-02 PROCEDURE — 85025 COMPLETE CBC W/AUTO DIFF WBC: CPT | Mod: 91,HCNC

## 2019-05-02 PROCEDURE — 84550 ASSAY OF BLOOD/URIC ACID: CPT | Mod: HCNC

## 2019-05-02 PROCEDURE — 86334 IMMUNOFIX E-PHORESIS SERUM: CPT | Mod: HCNC

## 2019-05-02 PROCEDURE — 84165 PROTEIN E-PHORESIS SERUM: CPT | Mod: 26,HCNC,, | Performed by: PATHOLOGY

## 2019-05-02 PROCEDURE — 86334 IMMUNOFIX E-PHORESIS SERUM: CPT | Mod: 26,HCNC,, | Performed by: PATHOLOGY

## 2019-05-02 PROCEDURE — 99223 1ST HOSP IP/OBS HIGH 75: CPT | Mod: HCNC,,, | Performed by: INTERNAL MEDICINE

## 2019-05-02 PROCEDURE — 85060 BLOOD SMEAR INTERPRETATION: CPT | Mod: HCNC,,, | Performed by: PATHOLOGY

## 2019-05-02 PROCEDURE — 20600001 HC STEP DOWN PRIVATE ROOM: Mod: HCNC

## 2019-05-02 PROCEDURE — 99233 PR SUBSEQUENT HOSPITAL CARE,LEVL III: ICD-10-PCS | Mod: HCNC,GC,, | Performed by: INTERNAL MEDICINE

## 2019-05-02 PROCEDURE — 86703 HIV-1/HIV-2 1 RESULT ANTBDY: CPT | Mod: HCNC

## 2019-05-02 PROCEDURE — 83615 LACTATE (LD) (LDH) ENZYME: CPT | Mod: HCNC

## 2019-05-02 PROCEDURE — 84300 ASSAY OF URINE SODIUM: CPT | Mod: HCNC

## 2019-05-02 PROCEDURE — 85060 PATHOLOGIST REVIEW: ICD-10-PCS | Mod: HCNC,,, | Performed by: PATHOLOGY

## 2019-05-02 PROCEDURE — 80053 COMPREHEN METABOLIC PANEL: CPT | Mod: HCNC

## 2019-05-02 PROCEDURE — 82955 ASSAY OF G6PD ENZYME: CPT | Mod: HCNC

## 2019-05-02 PROCEDURE — 82607 VITAMIN B-12: CPT | Mod: HCNC

## 2019-05-02 PROCEDURE — 86334 PATHOLOGIST INTERPRETATION IFE: ICD-10-PCS | Mod: 26,HCNC,, | Performed by: PATHOLOGY

## 2019-05-02 PROCEDURE — 99223 PR INITIAL HOSPITAL CARE,LEVL III: ICD-10-PCS | Mod: HCNC,,, | Performed by: INTERNAL MEDICINE

## 2019-05-02 PROCEDURE — 82746 ASSAY OF FOLIC ACID SERUM: CPT | Mod: HCNC

## 2019-05-02 PROCEDURE — 82784 ASSAY IGA/IGD/IGG/IGM EACH: CPT | Mod: 59,HCNC

## 2019-05-02 PROCEDURE — 36415 COLL VENOUS BLD VENIPUNCTURE: CPT | Mod: HCNC

## 2019-05-02 PROCEDURE — 83520 IMMUNOASSAY QUANT NOS NONAB: CPT | Mod: 59,HCNC

## 2019-05-02 PROCEDURE — 63600175 PHARM REV CODE 636 W HCPCS: Mod: HCNC | Performed by: STUDENT IN AN ORGANIZED HEALTH CARE EDUCATION/TRAINING PROGRAM

## 2019-05-02 RX ORDER — SODIUM CHLORIDE 9 MG/ML
INJECTION, SOLUTION INTRAVENOUS CONTINUOUS
Status: ACTIVE | OUTPATIENT
Start: 2019-05-02 | End: 2019-05-02

## 2019-05-02 RX ORDER — FUROSEMIDE 10 MG/ML
40 INJECTION INTRAMUSCULAR; INTRAVENOUS ONCE
Status: COMPLETED | OUTPATIENT
Start: 2019-05-03 | End: 2019-05-03

## 2019-05-02 RX ORDER — HEPARIN SODIUM 5000 [USP'U]/ML
5000 INJECTION, SOLUTION INTRAVENOUS; SUBCUTANEOUS EVERY 8 HOURS
Status: DISCONTINUED | OUTPATIENT
Start: 2019-05-02 | End: 2019-05-08 | Stop reason: HOSPADM

## 2019-05-02 RX ORDER — SODIUM CHLORIDE 9 MG/ML
INJECTION, SOLUTION INTRAVENOUS CONTINUOUS
Status: ACTIVE | OUTPATIENT
Start: 2019-05-03 | End: 2019-05-04

## 2019-05-02 RX ORDER — POTASSIUM CHLORIDE 20 MEQ/1
40 TABLET, EXTENDED RELEASE ORAL
Status: COMPLETED | OUTPATIENT
Start: 2019-05-02 | End: 2019-05-02

## 2019-05-02 RX ORDER — SEVELAMER CARBONATE 800 MG/1
800 TABLET, FILM COATED ORAL
Status: DISCONTINUED | OUTPATIENT
Start: 2019-05-02 | End: 2019-05-08

## 2019-05-02 RX ORDER — ALLOPURINOL 100 MG/1
200 TABLET ORAL DAILY
Status: DISCONTINUED | OUTPATIENT
Start: 2019-05-02 | End: 2019-05-07

## 2019-05-02 RX ADMIN — POTASSIUM CHLORIDE 40 MEQ: 1500 TABLET, EXTENDED RELEASE ORAL at 08:05

## 2019-05-02 RX ADMIN — HEPARIN SODIUM 5000 UNITS: 5000 INJECTION, SOLUTION INTRAVENOUS; SUBCUTANEOUS at 05:05

## 2019-05-02 RX ADMIN — POTASSIUM CHLORIDE 40 MEQ: 1500 TABLET, EXTENDED RELEASE ORAL at 10:05

## 2019-05-02 RX ADMIN — PANTOPRAZOLE SODIUM 40 MG: 40 TABLET, DELAYED RELEASE ORAL at 08:05

## 2019-05-02 RX ADMIN — GABAPENTIN 300 MG: 300 CAPSULE ORAL at 08:05

## 2019-05-02 RX ADMIN — SEVELAMER CARBONATE 800 MG: 800 TABLET, FILM COATED ORAL at 04:05

## 2019-05-02 RX ADMIN — SERTRALINE HYDROCHLORIDE 50 MG: 50 TABLET ORAL at 08:05

## 2019-05-02 RX ADMIN — ASPIRIN 81 MG CHEWABLE TABLET 81 MG: 81 TABLET CHEWABLE at 08:05

## 2019-05-02 RX ADMIN — SODIUM CHLORIDE: 0.9 INJECTION, SOLUTION INTRAVENOUS at 05:05

## 2019-05-02 RX ADMIN — ALLOPURINOL 200 MG: 100 TABLET ORAL at 04:05

## 2019-05-02 RX ADMIN — AMLODIPINE BESYLATE 5 MG: 5 TABLET ORAL at 08:05

## 2019-05-02 NOTE — ASSESSMENT & PLAN NOTE
Pt with history of breast cancer. CT and US suggested malignancy. Patient's has low albumin but high serum protein. Extra protein might be coming from malignant etiology. FUENTES can be caused from MM/tumor lysis syndrome vs hypoperfusion of kidney.  Patient also has high uric acid level which could cause FUENTES due to crystal deposition in renal tubule.      Recommendation  DDX is that FUENTES is coming from tumor lysis syndrome, tumor burden.   - Sevelermer TID with meal  - Renal diet  - In/Out  - Renally dose medication (Gabapentin)  - Renal diet  - Continuous NS 75ml of fluid with lasix use to flush out uric acid.   - Start Rasburicase to bring down uric acid level  - Get LDH level   - Get urine protein/Cr ratio   - Order SPEP/UPEP (done my heme/onc)  - Recommend Bone Marrow Bx  - continue trend BMP for increasing K+ and Phos  - Collect 24 hour urine sample for protein   - We will spin urine to visualized under microscope

## 2019-05-02 NOTE — PLAN OF CARE
Problem: Adult Inpatient Plan of Care  Goal: Plan of Care Review  Outcome: Ongoing (interventions implemented as appropriate)  Plan for repeat TTE today.  Nephrology consulted.  Urine and labs sent.  K 3.3, replaced with a total of 80 MEQ of potassium.  Will cont to monitor.

## 2019-05-02 NOTE — CONSULTS
[facility]  Nephrology  Consult Note    Patient Name: Shelby Thompson  MRN: [id number]  Admission Date: 4/30/2019  Hospital Length of Stay: 2 days  Attending Provider: Indira Rodrigez MD   Primary Care Physician: Emanuel Arevalo MD  Principal Problem:Acute respiratory failure with hypoxia    Consults  Subjective:     HPI: Ms. 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  She is also reporting left flank and chest pain. PCP ordered CT and it showed enhancing retroperitoneal soft tissue lesion, likely an enlarged retroperitoneal lymph node. TVUS showed thickening of endometrium. Findings are concerning for metastasis in the setting of occult malignancy. She gets relief with tramadol and by sitting on the edge of her recliner chair at home. She denies any nausea, vomiting, diaphoresis or worsening SOB with increases in her pain. In the hospital patient was diagnosed with HF and was treated with once 20 mg PO lasix and twice 40 mg IV lasix. Her SCr started to trending up since then. Patient is making urine and does not complain about dysuria, trouble urinating, no NSAID use. Nephrology was consulted for work up of FUENTES.           Past Medical History:   Diagnosis Date    Acute respiratory failure with hypoxia 4/30/2019    FUENTES (acute kidney injury) 5/1/2019    Allergy     Anemia     Aortic aneurysm     Breast cancer 10/2011    left breast Stage 0 DCIS    Chronic diastolic congestive heart failure 11/6/2015    Colon polyp     GERD (gastroesophageal reflux disease)     History of colonic polyps     HX: breast cancer      Hyperlipemia     Hypertension     ICH (intracerebral hemorrhage)     Major depressive disorder, single episode, mild 6/23/2016    Nuclear sclerosis 7/21/2014    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 7/21/2014    PAD (peripheral artery disease) 11/6/2015    Stroke 4/2011       Past Surgical History:   Procedure Laterality Date    APPENDECTOMY      BIOPSY LIVER AND ULTRASOUND N/A 4/6/2015    Performed by [facility] at [facility] 2ND FLR    BREAST BIOPSY Left 10/2011    BREAST LUMPECTOMY Left 2011    DCIS    COLONOSCOPY      COLONOSCOPY N/A 3/26/2015    Performed by Wicho Woodard MD at [facility] ENDO (4TH FLR)    CYST REMOVAL      back    ESOPHAGOGASTRODUODENOSCOPY  07/2016    duodenal angioectasia    ESOPHAGOGASTRODUODENOSCOPY (EGD) N/A 7/27/2016    Performed by Malik Sanchez MD at [facility] ENDO (4TH FLR)    FOOT FRACTURE SURGERY  10/2012    right foot, with R hallux valgus repair    FRACTURE SURGERY Right     broken toe repiar    HEMORRHOID SURGERY      LIVER BIOPSY      TUBAL LIGATION      UPPER GASTROINTESTINAL ENDOSCOPY         Review of patient's allergies indicates:   Allergen Reactions    Pravastatin Other (See Comments)     Itching, elevated cpk, muscle aches     Current Facility-Administered Medications   Medication Frequency    acetaminophen tablet 650 mg Q8H PRN    albuterol-ipratropium 2.5 mg-0.5 mg/3 mL nebulizer solution 3 mL Q6H PRN    amLODIPine tablet 5 mg Daily    aspirin chewable tablet 81 mg Daily    dextrose 50% injection 12.5 g PRN    dextrose 50% injection 25 g PRN    gabapentin capsule 300 mg BID    glucagon (human recombinant) injection 1 mg PRN    glucose chewable tablet 16 g PRN    glucose chewable tablet 24 g PRN    pantoprazole EC tablet 40 mg Daily    sertraline tablet 50 mg Daily    sodium chloride 0.9% flush 10 mL PRN    traMADol tablet 50 mg BID PRN     Family History     Problem Relation (Age of Onset)     Breast cancer Sister (62)    Cancer Sister (63), Father    Fibroids Daughter    Hypertension Daughter, Son, Maternal Grandmother    No Known Problems Sister, Mother, Brother, Brother, Maternal Aunt, Maternal Uncle, Paternal Aunt, Paternal Uncle, Maternal Grandfather, Paternal Grandmother, Paternal Grandfather        Tobacco Use    Smoking status: Former Smoker     Packs/day: 0.50     Years: 20.00     Pack years: 10.00     Types: Cigarettes     Last attempt to quit: 2011     Years since quittin.0    Smokeless tobacco: Former User     Quit date: 4/3/2011   Substance and Sexual Activity    Alcohol use: Yes     Comment: on occasion - one glass wine every 3 months    Drug use: No    Sexual activity: Yes     Partners: Male     Birth control/protection: Post-menopausal     Review of Systems   Constitutional: Positive for activity change and fatigue. Negative for chills and fever.   HENT: Negative for congestion and sinus pressure.    Respiratory: Positive for chest tightness and shortness of breath. Negative for wheezing.    Cardiovascular: Positive for chest pain. Negative for palpitations and leg swelling.   Gastrointestinal: Positive for abdominal distention. Negative for abdominal pain, constipation, diarrhea, nausea and vomiting.   Genitourinary: Positive for flank pain (left ). Negative for difficulty urinating, dysuria and hematuria.   Musculoskeletal: Negative for arthralgias.   Neurological: Positive for dizziness, tremors and weakness. Negative for light-headedness and numbness.   Psychiatric/Behavioral: Negative for agitation, behavioral problems and confusion.     Objective:     Vital Signs (Most Recent):  Temp: 97.9 °F (36.6 °C) (19 1115)  Pulse: 88 (19 1355)  Resp: 18 (19 0747)  BP: 114/62 (19 1115)  SpO2: (!) 90 % (19 1115)  O2 Device (Oxygen Therapy): room air (19 0451) Vital Signs (24h Range):  Temp:  [97.7 °F (36.5 °C)-98.3 °F (36.8 °C)] 97.9 °F (36.6  °C)  Pulse:  [81-95] 88  Resp:  [15-18] 18  SpO2:  [90 %-99 %] 90 %  BP: (109-121)/(57-74) 114/62     Weight: 73.9 kg (162 lb 14.7 oz) (05/02/19 0700)  Body mass index is 28.86 kg/m².  Body surface area is 1.81 meters squared.    I/O last 3 completed shifts:  In: 1060 [P.O.:1060]  Out: 600 [Urine:600]    Physical Exam   Constitutional: She is oriented to person, place, and time. She appears well-developed and well-nourished. No distress.   HENT:   Head: Normocephalic and atraumatic.   Neck: Normal range of motion. Neck supple. JVD present.   Cardiovascular: Normal rate, regular rhythm, normal heart sounds and intact distal pulses.   No murmur heard.  Pulmonary/Chest: No respiratory distress. She has rales.   Decreased in breath  Sound on left lung. Tenderness on left flank   Abdominal: Soft. Bowel sounds are normal. She exhibits distension. There is no tenderness.   Musculoskeletal: She exhibits tenderness. She exhibits no edema or deformity.   Neurological: She is alert and oriented to person, place, and time. No cranial nerve deficit.   Skin: Skin is warm and dry.   Psychiatric: She has a normal mood and affect. Her behavior is normal.       Significant Labs:  CBC:   Recent Labs   Lab 05/02/19  1214   WBC 5.87   RBC 3.22*   HGB 7.9*   HCT 25.5*      MCV 79*   MCH 24.5*   MCHC 31.0*     CMP:   Recent Labs   Lab 05/02/19  0542      CALCIUM 9.4   ALBUMIN 2.5*   PROT 12.8*   *   K 3.3*   CO2 28   CL 99   BUN 29*   CREATININE 2.7*   ALKPHOS 60   ALT 12   AST 14   BILITOT 0.3     LFTs:   Recent Labs   Lab 05/02/19  0542   ALT 12   AST 14   ALKPHOS 60   BILITOT 0.3   PROT 12.8*   ALBUMIN 2.5*     PTH: No results for input(s): PTH in the last 168 hours.  All labs within the past 24 hours have been reviewed.    Significant Imaging:  US: Reviewed  CT: Reviewed  MRI: Reviewed    Assessment/Plan:     FUENTES (acute kidney injury)  Pt with history of breast cancer. CT and US suggested malignancy. Patient's has  low albumin but high serum protein. Extra protein might be coming from malignant etiology. FUENTES can be caused from MM/tumor lysis syndrome vs hypoperfusion of kidney.  Patient also has high uric acid level which could cause FUENTES due to crystal deposition in renal tubule.      Recommendation  DDX is that FUENTES is coming from tumor lysis syndrome, tumor burden.   - Sevelermer TID with meal  - Renal diet  - In/Out  - Renally dose medication (Gabapentin)  - Renal diet  - Continuous NS 75ml of fluid with lasix use to flush out uric acid.   - Start Rasburicase to bring down uric acid level  - Get LDH level   - Get urine protein/Cr ratio   - Order SPEP/UPEP (done my heme/onc)  - Recommend Bone Marrow Bx  - continue trend BMP for increasing K+ and Phos  - Collect 24 hour urine sample for protein   - We will spin urine to visualized under microscope           Thank you for your consult. I will follow-up with patient. Please contact us if you have any additional questions.    ZOE Calvin MD  Nephrology  [facility]

## 2019-05-02 NOTE — SUBJECTIVE & OBJECTIVE
Past Medical History:   Diagnosis Date    Acute respiratory failure with hypoxia 4/30/2019    FUENTES (acute kidney injury) 5/1/2019    Allergy     Anemia     Aortic aneurysm     Breast cancer 10/2011    left breast Stage 0 DCIS    Chronic diastolic congestive heart failure 11/6/2015    Colon polyp     GERD (gastroesophageal reflux disease)     History of colonic polyps     HX: breast cancer     Hyperlipemia     Hypertension     ICH (intracerebral hemorrhage)     Major depressive disorder, single episode, mild 6/23/2016    Nuclear sclerosis 7/21/2014    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 7/21/2014    PAD (peripheral artery disease) 11/6/2015    Stroke 4/2011       Past Surgical History:   Procedure Laterality Date    APPENDECTOMY      BIOPSY LIVER AND ULTRASOUND N/A 4/6/2015    Performed by [facility] at [facility] OR 2ND FLR    BREAST BIOPSY Left 10/2011    BREAST LUMPECTOMY Left 2011    DCIS    COLONOSCOPY      COLONOSCOPY N/A 3/26/2015    Performed by Wicho Woodard MD at [facility] ENDO (4TH FLR)    CYST REMOVAL      back    ESOPHAGOGASTRODUODENOSCOPY  07/2016    duodenal angioectasia    ESOPHAGOGASTRODUODENOSCOPY (EGD) N/A 7/27/2016    Performed by Malik Sanchez MD at [facility] ENDO (4TH FLR)    FOOT FRACTURE SURGERY  10/2012    right foot, with R hallux valgus repair    FRACTURE SURGERY Right     broken toe repiar    HEMORRHOID SURGERY      LIVER BIOPSY      TUBAL LIGATION      UPPER GASTROINTESTINAL ENDOSCOPY         Review of patient's allergies indicates:   Allergen Reactions    Pravastatin Other (See Comments)     Itching, elevated cpk, muscle aches     Current Facility-Administered Medications   Medication Frequency    acetaminophen tablet 650 mg Q8H PRN    albuterol-ipratropium 2.5 mg-0.5 mg/3 mL nebulizer solution 3 mL Q6H PRN    amLODIPine tablet 5 mg Daily    aspirin chewable tablet 81 mg Daily    dextrose 50% injection 12.5 g  PRN    dextrose 50% injection 25 g PRN    gabapentin capsule 300 mg BID    glucagon (human recombinant) injection 1 mg PRN    glucose chewable tablet 16 g PRN    glucose chewable tablet 24 g PRN    pantoprazole EC tablet 40 mg Daily    sertraline tablet 50 mg Daily    sodium chloride 0.9% flush 10 mL PRN    traMADol tablet 50 mg BID PRN     Family History     Problem Relation (Age of Onset)    Breast cancer Sister (62)    Cancer Sister (63), Father    Fibroids Daughter    Hypertension Daughter, Son, Maternal Grandmother    No Known Problems Sister, Mother, Brother, Brother, Maternal Aunt, Maternal Uncle, Paternal Aunt, Paternal Uncle, Maternal Grandfather, Paternal Grandmother, Paternal Grandfather        Tobacco Use    Smoking status: Former Smoker     Packs/day: 0.50     Years: 20.00     Pack years: 10.00     Types: Cigarettes     Last attempt to quit: 2011     Years since quittin.0    Smokeless tobacco: Former User     Quit date: 4/3/2011   Substance and Sexual Activity    Alcohol use: Yes     Comment: on occasion - one glass wine every 3 months    Drug use: No    Sexual activity: Yes     Partners: Male     Birth control/protection: Post-menopausal     Review of Systems   Constitutional: Positive for activity change and fatigue. Negative for chills and fever.   HENT: Negative for congestion and sinus pressure.    Respiratory: Positive for chest tightness and shortness of breath. Negative for wheezing.    Cardiovascular: Positive for chest pain. Negative for palpitations and leg swelling.   Gastrointestinal: Positive for abdominal distention. Negative for abdominal pain, constipation, diarrhea, nausea and vomiting.   Genitourinary: Positive for flank pain (left ). Negative for difficulty urinating, dysuria and hematuria.   Musculoskeletal: Negative for arthralgias.   Neurological: Positive for dizziness, tremors and weakness. Negative for light-headedness and numbness.    Psychiatric/Behavioral: Negative for agitation, behavioral problems and confusion.     Objective:     Vital Signs (Most Recent):  Temp: 97.9 °F (36.6 °C) (05/02/19 1115)  Pulse: 88 (05/02/19 1355)  Resp: 18 (05/02/19 0747)  BP: 114/62 (05/02/19 1115)  SpO2: (!) 90 % (05/02/19 1115)  O2 Device (Oxygen Therapy): room air (05/02/19 0451) Vital Signs (24h Range):  Temp:  [97.7 °F (36.5 °C)-98.3 °F (36.8 °C)] 97.9 °F (36.6 °C)  Pulse:  [81-95] 88  Resp:  [15-18] 18  SpO2:  [90 %-99 %] 90 %  BP: (109-121)/(57-74) 114/62     Weight: 73.9 kg (162 lb 14.7 oz) (05/02/19 0700)  Body mass index is 28.86 kg/m².  Body surface area is 1.81 meters squared.    I/O last 3 completed shifts:  In: 1060 [P.O.:1060]  Out: 600 [Urine:600]    Physical Exam   Constitutional: She is oriented to person, place, and time. She appears well-developed and well-nourished. No distress.   HENT:   Head: Normocephalic and atraumatic.   Neck: Normal range of motion. Neck supple. JVD present.   Cardiovascular: Normal rate, regular rhythm, normal heart sounds and intact distal pulses.   No murmur heard.  Pulmonary/Chest: No respiratory distress. She has rales.   Decreased in breath  Sound on left lung. Tenderness on left flank   Abdominal: Soft. Bowel sounds are normal. She exhibits distension. There is no tenderness.   Musculoskeletal: She exhibits tenderness. She exhibits no edema or deformity.   Neurological: She is alert and oriented to person, place, and time. No cranial nerve deficit.   Skin: Skin is warm and dry.   Psychiatric: She has a normal mood and affect. Her behavior is normal.       Significant Labs:  CBC:   Recent Labs   Lab 05/02/19  1214   WBC 5.87   RBC 3.22*   HGB 7.9*   HCT 25.5*      MCV 79*   MCH 24.5*   MCHC 31.0*     CMP:   Recent Labs   Lab 05/02/19  0542      CALCIUM 9.4   ALBUMIN 2.5*   PROT 12.8*   *   K 3.3*   CO2 28   CL 99   BUN 29*   CREATININE 2.7*   ALKPHOS 60   ALT 12   AST 14   BILITOT 0.3      LFTs:   Recent Labs   Lab 05/02/19  0542   ALT 12   AST 14   ALKPHOS 60   BILITOT 0.3   PROT 12.8*   ALBUMIN 2.5*     PTH: No results for input(s): PTH in the last 168 hours.  All labs within the past 24 hours have been reviewed.    Significant Imaging:  US: Reviewed  CT: Reviewed  MRI: Reviewed

## 2019-05-02 NOTE — CONSULTS
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 Hematology/Oncology consulted for suspected underlying malignancy.    Patient also endorses left flank pain. Seen today with her  at bedside. Denies weight loss, fever/chills, night sweats. Denies melena, bleeding/bruising, hematuria, vaginal bleeding. The patient had a normal mammogram 6 months ago. Had an EGD back in 2016 which showed a nonbleeding angio ectasia.  Had a colonoscopy back in March 2015 showing a 5 mm hyperplastic polyp.    Liver U/S done 1 month ago (4/4/19) showed innumerable new hepatic lesions concerning for metastatic disease, as well as 1.8 cm peripancreatic lymph node. CT abdomen/pelvis done 4/10/19 showed   innumerable indeterminate enhancing lesions identified throughout the liver. No definitive washout on delayed phases. Abdomen MRI done 4/30/19 showed multiple small indeterminate hepatic lesions. Of note, the patient had extensive work-up by hepatology 3 years ago, including liver biopsy (2015), which did not reveal any significant abnormality. CTA chest done 4/30/19 showed T7 compression fracture.    Patient had 1 sister with breast cancer, 1 sister with lung cancer. No hematologic malignancy. Former smoker, quit in 2011. Seldom alcohol use. Retired .    Review of patient's allergies indicates:   Allergen Reactions    Pravastatin Other (See Comments)     Itching, elevated cpk, muscle  aches     Past Medical History:   Diagnosis Date    Acute respiratory failure with hypoxia 4/30/2019    FUENTES (acute kidney injury) 5/1/2019    Allergy     Anemia     Aortic aneurysm     Breast cancer 10/2011    left breast Stage 0 DCIS    Chronic diastolic congestive heart failure 11/6/2015    Colon polyp     GERD (gastroesophageal reflux disease)     History of colonic polyps     HX: breast cancer     Hyperlipemia     Hypertension     ICH (intracerebral hemorrhage)     Major depressive disorder, single episode, mild 6/23/2016    Nuclear sclerosis 7/21/2014    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 7/21/2014    PAD (peripheral artery disease) 11/6/2015    Stroke 4/2011     Past Surgical History:   Procedure Laterality Date    APPENDECTOMY      BIOPSY LIVER AND ULTRASOUND N/A 4/6/2015    Performed by [facility] OR 2ND FLR    BREAST BIOPSY Left 10/2011    BREAST LUMPECTOMY Left 2011    DCIS    COLONOSCOPY      COLONOSCOPY N/A 3/26/2015    Performed by Wicho Woodard MD at [facility] ENDO (4TH FLR)    CYST REMOVAL      back    ESOPHAGOGASTRODUODENOSCOPY  07/2016    duodenal angioectasia    ESOPHAGOGASTRODUODENOSCOPY (EGD) N/A 7/27/2016    Performed by Malik Sanchez MD at [facility] ENDO (4TH FLR)    FOOT FRACTURE SURGERY  10/2012    right foot, with R hallux valgus repair    FRACTURE SURGERY Right     broken toe repiar    HEMORRHOID SURGERY      LIVER BIOPSY      TUBAL LIGATION      UPPER GASTROINTESTINAL ENDOSCOPY       Family History   Problem Relation Age of Onset    No Known Problems Sister     Cancer Sister 63        Lung Cancer    No Known Problems Mother     Cancer Father     No Known Problems Brother     Hypertension Daughter     Fibroids Daughter         uterine    Hypertension Son     Breast cancer Sister 62    No Known Problems Brother     No Known Problems Maternal Aunt     No Known Problems Maternal Uncle     No  Known Problems Paternal Aunt     No Known Problems Paternal Uncle     Hypertension Maternal Grandmother     No Known Problems Maternal Grandfather     No Known Problems Paternal Grandmother     No Known Problems Paternal Grandfather     Ovarian cancer Neg Hx     Colon cancer Neg Hx     Tremor Neg Hx     Amblyopia Neg Hx     Blindness Neg Hx     Cataracts Neg Hx     Diabetes Neg Hx     Glaucoma Neg Hx     Macular degeneration Neg Hx     Retinal detachment Neg Hx     Strabismus Neg Hx     Stroke Neg Hx     Thyroid disease Neg Hx     Esophageal cancer Neg Hx     Rectal cancer Neg Hx     Stomach cancer Neg Hx     Ulcerative colitis Neg Hx     Crohn's disease Neg Hx     Irritable bowel syndrome Neg Hx     Celiac disease Neg Hx      Social History     Tobacco Use    Smoking status: Former Smoker     Packs/day: 0.50     Years: 20.00     Pack years: 10.00     Types: Cigarettes     Last attempt to quit: 2011     Years since quittin.0    Smokeless tobacco: Former User     Quit date: 4/3/2011   Substance Use Topics    Alcohol use: Yes     Comment: on occasion - one glass wine every 3 months    Drug use: No     Review of Systems   Constitutional: Positive for malaise/fatigue. Negative for chills, fever and weight loss.   HENT: Negative for nosebleeds and sore throat.    Eyes: Negative for blurred vision and double vision.   Respiratory: Positive for shortness of breath. Negative for cough and hemoptysis.    Cardiovascular: Negative for chest pain and leg swelling.   Gastrointestinal: Negative for abdominal pain, blood in stool, constipation, diarrhea, melena, nausea and vomiting.   Genitourinary: Negative for hematuria.   Musculoskeletal: Positive for back pain and joint pain. Negative for myalgias.   Skin: Negative for rash.   Neurological: Negative for dizziness, sensory change and headaches.   Endo/Heme/Allergies: Does not bruise/bleed easily.     OBJECTIVE:     Vital Signs:  Temp:  [97.7  °F (36.5 °C)-98 °F (36.7 °C)]   Pulse:  [81-93]   Resp:  [16-18]   BP: (110-114)/(57-64)   SpO2:  [90 %-99 %]     Physical Exam   Constitutional: She is oriented to person, place, and time. She appears well-developed and well-nourished.   Sitting at edge of bed eating lunch   Eyes: EOM are normal.   Neck: Normal range of motion.   Cardiovascular: Normal rate and regular rhythm.   Pulmonary/Chest: Effort normal. No respiratory distress.   Abdominal: Soft. She exhibits no distension. There is no tenderness.   Musculoskeletal: She exhibits no edema.   No spinal tenderness   Neurological: She is alert and oriented to person, place, and time.   Skin: Skin is warm and dry.   Psychiatric: She has a normal mood and affect. Her behavior is normal.     Laboratory:  CBC:   Recent Labs   Lab 05/02/19  1043   WBC 4.98   RBC 2.97*   HGB 7.3*   HCT 23.7*      MCV 80*   MCH 24.6*   MCHC 30.8*     CMP:   Recent Labs   Lab 05/02/19  0542      CALCIUM 9.4   ALBUMIN 2.5*   PROT 12.8*   *   K 3.3*   CO2 28   CL 99   BUN 29*   CREATININE 2.7*   ALKPHOS 60   ALT 12   AST 14   BILITOT 0.3       Diagnostic Results:  Narrative     EXAMINATION:  MRI ABDOMEN PELVIS WITHOUT CONTRAST (XPD)    CLINICAL HISTORY:  Neoplasm - lymphoma;Neoplasm: abdomen, heme/lymphatic, recurrence, suspected/known;Neoplasm: abdomen, metastatic, suspected;Neoplasm: abdomen, other primary, suspected;multiple mets found to liver, with enlargment of pancreatic and retroperitoneal lymph node, unkown primary. neoplasm suspected of pe    TECHNIQUE:  Multiplanar, multisequence MRI of the abdomen and pelvis were performed without the use of intravenous contrast.    COMPARISON:  CT 04/10/2019, abdominal ultrasound 04/04/2019    FINDINGS:  Lung bases: Unremarkable.    Liver: The liver is normal in size.  Multiple T2 hyperintense lesions, some of which demonstrate diffusion restriction, throughout the liver corresponding to and better characterized on  previous contrast enhanced CT examination.  Evaluation these lesions is limited without the use of intravenous contrast.  Lesions appear similar in size and distribution from prior CT examination.  Two separate T2 hyperintense lesions demonstrating tubular, branching morphology within the left hepatic lobe within segment 2 and right hepatic lobe within segment 7 compatible with vascular malformations, similar prior ultrasound.    Bile Ducts: Unremarkable.    Gallbladder: Unremarkable.    Pancreas: Unremarkable    Spleen: Diffusion hyperintense lesion within the spleen, corresponding to enhancing lesion on prior CT examination, concerning for metastasis.    Gastrointestinal tract: Unremarkable    Mesentery: Unremarkable    Adrenal Glands: Unremarkable    Kidneys/ureters: Unremarkable    Aorta and Abdominal Vasculature: Aneurysmal dilatation of the proximal abdominal aorta, similar to prior CT, with associated mural thrombus.    Lymph nodes: Previously visualized enhancing retroperitoneal lesion is not well visualized with noncontrast MR technique and likely represents a enlarged lymph node.  Additional previously visualized small retro peritoneal/periaortic lymph nodes are also not evaluated due to artifact.    Pelvis: Persistent endometrial thickening, similar to prior CT examination.  Probable fibroid within the anterior myometrium measuring up to 3.9 cm.  Urinary bladder is unremarkable.    Body wall: Unremarkable.    Other: Unremarkable.      Impression       1. Multiple small indeterminate hepatic lesions, correlating with the 04/10/2019 CT examination, noting limited evaluation without IV contrast.    2. Two additional separate hepatic lesions with tubular/ branching morphology, suggestive of vascular malformations, correlating with 4/4/19 ultrasound.    3. Additional indeterminate splenic lesion noted, corresponding to enhancing lesion on prior CT.    4.  Thickened endometrium, similar to prior CT examination.   This could be further evaluated with pelvic ultrasound.    5.  Stable aneurysmal dilatation of the abdominal aorta.    Electronically signed by resident: Jamie Oconnor  Date: 05/01/2019  Time: 08:38    Electronically signed by: Benjamín Guidry MD  Date: 05/01/2019  Time: 12:58         ASSESSMENT/PLAN:   IMPRESSION  1) Concern for underlying neoplasm, paraproteinemia  -Patient with gamma gap, anemia, renal failure, compression fracture, concerning for plasma cell dyscrasia  -Corrected calcium 10.6  -Compression fracture at T7 seen on chest CTA    2) Microcytic anemia  -Ferritin 54  -Patient has been on PO iron, endorses anemia her entire life. No previous transfusion, no h/o IV iron.  -H/o angioectasias on EGD, colonoscopy with 1 hyperplastic polyp in 2015    3) Liver lesions, unclear etiology    4) H/o breast cancer s/p lumpectomy and RT  -Denies receiving endocrine therapy or chemotherapy    RECOMMENDATIONS  -Have ordered SPEP, NATALY, FLC, beta-2-microglobulin, uric acid  -Quantitative Ig and G6PD ordered  -HIV and hepatitis ordered  -Anemia evaluation ordered  -Monitor calcium closely  -Discussed that pending above work-up, will possibly pursue bone marrow biopsy tomorrow. Discussed what this procedure would involve with patient and her . All questions answered.  -May also need repeat liver biopsy and skeletal imaging pending above work-up  -->Uric acid elevated, will initiate allopurinol (ordered)    The following will be discussed with supervising physician Dr. Horn. Please contact Hematology consults with any additional questions.     Ankita Woodward MD  Hematology/Oncology Fellow      I have reviewed the notes, assessments, and/or procedures performed by the housestaff, as above.  I have personally interviewed and examined the patient at the beside, and rounded with the housestaff. I concur with her/his assessment and plan and the documentation of Shelby Thompson.  I, Dr. Duarte Horn, personally spent  more than 70 mins during this encounter, greater than 50% was spent in direct counseling and/or coordination of care.     Duarte Horn M.D., M.S., F.A.C.P.  Hematology/Oncology Attending  [facility]

## 2019-05-02 NOTE — HPI
Ms. 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  She is also reporting left flank and chest pain. PCP ordered CT and it showed enhancing retroperitoneal soft tissue lesion, likely an enlarged retroperitoneal lymph node. TVUS showed thickening of endometrium. Findings are concerning for metastasis in the setting of occult malignancy. She gets relief with tramadol and by sitting on the edge of her recliner chair at home. She denies any nausea, vomiting, diaphoresis or worsening SOB with increases in her pain. In the hospital patient was diagnosed with HF and was treated with once 20 mg PO lasix and twice 40 mg IV lasix. Her SCr started to trending up since then. Patient is making urine and does not complain about dysuria, trouble urinating, no NSAID use. Nephrology was consulted for work up of FUENTES.

## 2019-05-03 LAB
ALBUMIN SERPL BCP-MCNC: 2.6 G/DL (ref 3.5–5.2)
ALBUMIN SERPL BCP-MCNC: 2.6 G/DL (ref 3.5–5.2)
ALBUMIN SERPL BCP-MCNC: 2.7 G/DL (ref 3.5–5.2)
ALBUMIN SERPL ELPH-MCNC: 4.59 G/DL (ref 3.35–5.55)
ALP SERPL-CCNC: 57 U/L (ref 55–135)
ALPHA1 GLOB SERPL ELPH-MCNC: 0.42 G/DL (ref 0.17–0.41)
ALPHA2 GLOB SERPL ELPH-MCNC: 0.91 G/DL (ref 0.43–0.99)
ALT SERPL W/O P-5'-P-CCNC: 11 U/L (ref 10–44)
ANION GAP SERPL CALC-SCNC: 4 MMOL/L (ref 8–16)
ANION GAP SERPL CALC-SCNC: 5 MMOL/L (ref 8–16)
ANION GAP SERPL CALC-SCNC: 5 MMOL/L (ref 8–16)
AST SERPL-CCNC: 14 U/L (ref 10–40)
B-GLOBULIN SERPL ELPH-MCNC: 0.71 G/DL (ref 0.5–1.1)
BACTERIA UR CULT: NORMAL
BACTERIA UR CULT: NORMAL
BASOPHILS # BLD AUTO: 0.03 K/UL (ref 0–0.2)
BASOPHILS NFR BLD: 0.6 % (ref 0–1.9)
BILIRUB SERPL-MCNC: 0.3 MG/DL (ref 0.1–1)
BONE MARROW WRIGHT STAIN COMMENT: NORMAL
BUN SERPL-MCNC: 30 MG/DL (ref 8–23)
BUN SERPL-MCNC: 30 MG/DL (ref 8–23)
BUN SERPL-MCNC: 31 MG/DL (ref 8–23)
CALCIUM SERPL-MCNC: 9.4 MG/DL (ref 8.7–10.5)
CALCIUM SERPL-MCNC: 9.4 MG/DL (ref 8.7–10.5)
CALCIUM SERPL-MCNC: 9.6 MG/DL (ref 8.7–10.5)
CHLORIDE SERPL-SCNC: 101 MMOL/L (ref 95–110)
CO2 SERPL-SCNC: 27 MMOL/L (ref 23–29)
CO2 SERPL-SCNC: 28 MMOL/L (ref 23–29)
CO2 SERPL-SCNC: 28 MMOL/L (ref 23–29)
CREAT SERPL-MCNC: 2.3 MG/DL (ref 0.5–1.4)
CREAT SERPL-MCNC: 2.5 MG/DL (ref 0.5–1.4)
CREAT SERPL-MCNC: 2.5 MG/DL (ref 0.5–1.4)
DIFFERENTIAL METHOD: ABNORMAL
EOSINOPHIL # BLD AUTO: 0.1 K/UL (ref 0–0.5)
EOSINOPHIL NFR BLD: 2.7 % (ref 0–8)
ERYTHROCYTE [DISTWIDTH] IN BLOOD BY AUTOMATED COUNT: 19.4 % (ref 11.5–14.5)
EST. GFR  (AFRICAN AMERICAN): 21.8 ML/MIN/1.73 M^2
EST. GFR  (AFRICAN AMERICAN): 21.8 ML/MIN/1.73 M^2
EST. GFR  (AFRICAN AMERICAN): 24.1 ML/MIN/1.73 M^2
EST. GFR  (NON AFRICAN AMERICAN): 18.9 ML/MIN/1.73 M^2
EST. GFR  (NON AFRICAN AMERICAN): 18.9 ML/MIN/1.73 M^2
EST. GFR  (NON AFRICAN AMERICAN): 20.9 ML/MIN/1.73 M^2
GAMMA GLOB SERPL ELPH-MCNC: 7.36 G/DL (ref 0.67–1.58)
GLUCOSE SERPL-MCNC: 77 MG/DL (ref 70–110)
GLUCOSE SERPL-MCNC: 88 MG/DL (ref 70–110)
GLUCOSE SERPL-MCNC: 95 MG/DL (ref 70–110)
HCT VFR BLD AUTO: 23.6 % (ref 37–48.5)
HGB BLD-MCNC: 7.1 G/DL (ref 12–16)
IMM GRANULOCYTES # BLD AUTO: 0.02 K/UL (ref 0–0.04)
IMM GRANULOCYTES NFR BLD AUTO: 0.4 % (ref 0–0.5)
INTERPRETATION SERPL IFE-IMP: NORMAL
KAPPA LC SER QL IA: 548 MG/DL (ref 0.33–1.94)
KAPPA LC/LAMBDA SER IA: 693.7 (ref 0.26–1.65)
LAMBDA LC SER QL IA: 0.79 MG/DL (ref 0.57–2.63)
LYMPHOCYTES # BLD AUTO: 1.5 K/UL (ref 1–4.8)
LYMPHOCYTES NFR BLD: 29.6 % (ref 18–48)
MAGNESIUM SERPL-MCNC: 1.9 MG/DL (ref 1.6–2.6)
MCH RBC QN AUTO: 24.1 PG (ref 27–31)
MCHC RBC AUTO-ENTMCNC: 30.1 G/DL (ref 32–36)
MCV RBC AUTO: 80 FL (ref 82–98)
MONOCYTES # BLD AUTO: 0.6 K/UL (ref 0.3–1)
MONOCYTES NFR BLD: 12.7 % (ref 4–15)
NEUTROPHILS # BLD AUTO: 2.7 K/UL (ref 1.8–7.7)
NEUTROPHILS NFR BLD: 54 % (ref 38–73)
NRBC BLD-RTO: 0 /100 WBC
PATH REV BLD -IMP: NORMAL
PATH REV BLD -IMP: NORMAL
PATHOLOGIST INTERPRETATION IFE: NORMAL
PATHOLOGIST INTERPRETATION SPE: NORMAL
PHOSPHATE SERPL-MCNC: 4.9 MG/DL (ref 2.7–4.5)
PHOSPHATE SERPL-MCNC: 5.8 MG/DL (ref 2.7–4.5)
PHOSPHATE SERPL-MCNC: 6 MG/DL (ref 2.7–4.5)
PLATELET # BLD AUTO: 223 K/UL (ref 150–350)
PMV BLD AUTO: 10.4 FL (ref 9.2–12.9)
POTASSIUM SERPL-SCNC: 4.2 MMOL/L (ref 3.5–5.1)
POTASSIUM SERPL-SCNC: 4.2 MMOL/L (ref 3.5–5.1)
POTASSIUM SERPL-SCNC: 4.5 MMOL/L (ref 3.5–5.1)
PROT SERPL-MCNC: 13.5 G/DL (ref 6–8.4)
PROT SERPL-MCNC: 14 G/DL (ref 6–8.4)
RBC # BLD AUTO: 2.94 M/UL (ref 4–5.4)
SODIUM SERPL-SCNC: 133 MMOL/L (ref 136–145)
SODIUM SERPL-SCNC: 133 MMOL/L (ref 136–145)
SODIUM SERPL-SCNC: 134 MMOL/L (ref 136–145)
URATE SERPL-MCNC: 10.9 MG/DL (ref 2.4–5.7)
WBC # BLD AUTO: 4.9 K/UL (ref 3.9–12.7)

## 2019-05-03 PROCEDURE — 36415 COLL VENOUS BLD VENIPUNCTURE: CPT | Mod: HCNC

## 2019-05-03 PROCEDURE — 88185 FLOWCYTOMETRY/TC ADD-ON: CPT | Mod: HCNC | Performed by: PATHOLOGY

## 2019-05-03 PROCEDURE — 88305 TISSUE SPECIMEN TO PATHOLOGY, BONE MARROW ASPIRATION/BIOPSY PROCEDURE: ICD-10-PCS | Mod: 26,HCNC,, | Performed by: PATHOLOGY

## 2019-05-03 PROCEDURE — 25000003 PHARM REV CODE 250: Mod: HCNC | Performed by: STUDENT IN AN ORGANIZED HEALTH CARE EDUCATION/TRAINING PROGRAM

## 2019-05-03 PROCEDURE — 38221 DX BONE MARROW BIOPSIES: CPT

## 2019-05-03 PROCEDURE — 63600175 PHARM REV CODE 636 W HCPCS: Mod: HCNC | Performed by: STUDENT IN AN ORGANIZED HEALTH CARE EDUCATION/TRAINING PROGRAM

## 2019-05-03 PROCEDURE — 88341 IMHCHEM/IMCYTCHM EA ADD ANTB: CPT | Mod: 26,HCNC,, | Performed by: PATHOLOGY

## 2019-05-03 PROCEDURE — 84550 ASSAY OF BLOOD/URIC ACID: CPT | Mod: HCNC

## 2019-05-03 PROCEDURE — 88311 TISSUE SPECIMEN TO PATHOLOGY, BONE MARROW ASPIRATION/BIOPSY PROCEDURE: ICD-10-PCS | Mod: 26,HCNC,, | Performed by: PATHOLOGY

## 2019-05-03 PROCEDURE — 88184 FLOWCYTOMETRY/ TC 1 MARKER: CPT | Mod: HCNC | Performed by: PATHOLOGY

## 2019-05-03 PROCEDURE — 88271 CYTOGENETICS DNA PROBE: CPT | Mod: HCNC

## 2019-05-03 PROCEDURE — 84100 ASSAY OF PHOSPHORUS: CPT | Mod: HCNC

## 2019-05-03 PROCEDURE — 88341 TISSUE SPECIMEN TO PATHOLOGY, BONE MARROW ASPIRATION/BIOPSY PROCEDURE: ICD-10-PCS | Mod: 26,HCNC,, | Performed by: PATHOLOGY

## 2019-05-03 PROCEDURE — 38222 BONE MARROW: ICD-10-PCS | Mod: HCNC,LT,, | Performed by: STUDENT IN AN ORGANIZED HEALTH CARE EDUCATION/TRAINING PROGRAM

## 2019-05-03 PROCEDURE — 88189 FLOWCYTOMETRY/READ 16 & >: CPT | Mod: HCNC,,, | Performed by: PATHOLOGY

## 2019-05-03 PROCEDURE — 88342 IMHCHEM/IMCYTCHM 1ST ANTB: CPT | Mod: 26,HCNC,, | Performed by: PATHOLOGY

## 2019-05-03 PROCEDURE — 88313 SPECIAL STAINS GROUP 2: CPT | Mod: HCNC

## 2019-05-03 PROCEDURE — 85025 COMPLETE CBC W/AUTO DIFF WBC: CPT | Mod: HCNC

## 2019-05-03 PROCEDURE — 88341 IMHCHEM/IMCYTCHM EA ADD ANTB: CPT | Mod: HCNC | Performed by: PATHOLOGY

## 2019-05-03 PROCEDURE — 88299 UNLISTED CYTOGENETIC STUDY: CPT | Mod: HCNC

## 2019-05-03 PROCEDURE — 85097 TISSUE SPECIMEN TO PATHOLOGY, BONE MARROW ASPIRATION/BIOPSY PROCEDURE: ICD-10-PCS | Mod: 59,HCNC,, | Performed by: PATHOLOGY

## 2019-05-03 PROCEDURE — 88342 IMHCHEM/IMCYTCHM 1ST ANTB: CPT | Mod: HCNC | Performed by: PATHOLOGY

## 2019-05-03 PROCEDURE — 88313 SPECIAL STAINS GROUP 2: CPT | Mod: 26,HCNC,, | Performed by: PATHOLOGY

## 2019-05-03 PROCEDURE — 83735 ASSAY OF MAGNESIUM: CPT | Mod: HCNC

## 2019-05-03 PROCEDURE — 88264 CHROMOSOME ANALYSIS 20-25: CPT | Mod: HCNC

## 2019-05-03 PROCEDURE — 99233 SBSQ HOSP IP/OBS HIGH 50: CPT | Mod: HCNC,,, | Performed by: INTERNAL MEDICINE

## 2019-05-03 PROCEDURE — 88305 TISSUE EXAM BY PATHOLOGIST: CPT | Mod: HCNC | Performed by: PATHOLOGY

## 2019-05-03 PROCEDURE — 20600001 HC STEP DOWN PRIVATE ROOM: Mod: HCNC

## 2019-05-03 PROCEDURE — 88342 TISSUE SPECIMEN TO PATHOLOGY, BONE MARROW ASPIRATION/BIOPSY PROCEDURE: ICD-10-PCS | Mod: 26,HCNC,, | Performed by: PATHOLOGY

## 2019-05-03 PROCEDURE — 88305 TISSUE EXAM BY PATHOLOGIST: CPT | Mod: 26,HCNC,, | Performed by: PATHOLOGY

## 2019-05-03 PROCEDURE — 88237 TISSUE CULTURE BONE MARROW: CPT | Mod: HCNC

## 2019-05-03 PROCEDURE — 99233 SBSQ HOSP IP/OBS HIGH 50: CPT | Mod: HCNC,GC,, | Performed by: INTERNAL MEDICINE

## 2019-05-03 PROCEDURE — 80069 RENAL FUNCTION PANEL: CPT | Mod: HCNC

## 2019-05-03 PROCEDURE — 38222 DX BONE MARROW BX & ASPIR: CPT | Mod: HCNC,LT,, | Performed by: STUDENT IN AN ORGANIZED HEALTH CARE EDUCATION/TRAINING PROGRAM

## 2019-05-03 PROCEDURE — 85097 BONE MARROW INTERPRETATION: CPT | Mod: 59,HCNC,, | Performed by: PATHOLOGY

## 2019-05-03 PROCEDURE — 88313 TISSUE SPECIMEN TO PATHOLOGY, BONE MARROW ASPIRATION/BIOPSY PROCEDURE: ICD-10-PCS | Mod: 26,HCNC,, | Performed by: PATHOLOGY

## 2019-05-03 PROCEDURE — 88189 PR  FLOWCYTOMETRY/READ, 16 & > MARKERS: ICD-10-PCS | Mod: HCNC,,, | Performed by: PATHOLOGY

## 2019-05-03 PROCEDURE — 88311 DECALCIFY TISSUE: CPT | Mod: 26,HCNC,, | Performed by: PATHOLOGY

## 2019-05-03 PROCEDURE — 80053 COMPREHEN METABOLIC PANEL: CPT | Mod: HCNC

## 2019-05-03 PROCEDURE — 99233 PR SUBSEQUENT HOSPITAL CARE,LEVL III: ICD-10-PCS | Mod: HCNC,GC,, | Performed by: INTERNAL MEDICINE

## 2019-05-03 PROCEDURE — 99233 PR SUBSEQUENT HOSPITAL CARE,LEVL III: ICD-10-PCS | Mod: HCNC,,, | Performed by: INTERNAL MEDICINE

## 2019-05-03 RX ORDER — HYDROMORPHONE HYDROCHLORIDE 1 MG/ML
1 INJECTION, SOLUTION INTRAMUSCULAR; INTRAVENOUS; SUBCUTANEOUS ONCE
Status: COMPLETED | OUTPATIENT
Start: 2019-05-03 | End: 2019-05-03

## 2019-05-03 RX ORDER — FUROSEMIDE 10 MG/ML
40 INJECTION INTRAMUSCULAR; INTRAVENOUS DAILY
Status: DISCONTINUED | OUTPATIENT
Start: 2019-05-03 | End: 2019-05-04

## 2019-05-03 RX ORDER — LORAZEPAM 2 MG/ML
1 INJECTION INTRAMUSCULAR ONCE
Status: COMPLETED | OUTPATIENT
Start: 2019-05-03 | End: 2019-05-03

## 2019-05-03 RX ORDER — LIDOCAINE HYDROCHLORIDE 20 MG/ML
10 INJECTION, SOLUTION INFILTRATION; PERINEURAL ONCE
Status: COMPLETED | OUTPATIENT
Start: 2019-05-03 | End: 2019-05-03

## 2019-05-03 RX ADMIN — GABAPENTIN 300 MG: 300 CAPSULE ORAL at 09:05

## 2019-05-03 RX ADMIN — SODIUM CHLORIDE: 0.9 INJECTION, SOLUTION INTRAVENOUS at 01:05

## 2019-05-03 RX ADMIN — HEPARIN SODIUM 5000 UNITS: 5000 INJECTION, SOLUTION INTRAVENOUS; SUBCUTANEOUS at 06:05

## 2019-05-03 RX ADMIN — SODIUM CHLORIDE: 0.9 INJECTION, SOLUTION INTRAVENOUS at 02:05

## 2019-05-03 RX ADMIN — HEPARIN SODIUM 5000 UNITS: 5000 INJECTION, SOLUTION INTRAVENOUS; SUBCUTANEOUS at 02:05

## 2019-05-03 RX ADMIN — HEPARIN SODIUM 5000 UNITS: 5000 INJECTION, SOLUTION INTRAVENOUS; SUBCUTANEOUS at 10:05

## 2019-05-03 RX ADMIN — SEVELAMER CARBONATE 800 MG: 800 TABLET, FILM COATED ORAL at 05:05

## 2019-05-03 RX ADMIN — PANTOPRAZOLE SODIUM 40 MG: 40 TABLET, DELAYED RELEASE ORAL at 09:05

## 2019-05-03 RX ADMIN — TRAMADOL HYDROCHLORIDE 50 MG: 50 TABLET, FILM COATED ORAL at 07:05

## 2019-05-03 RX ADMIN — ASPIRIN 81 MG CHEWABLE TABLET 81 MG: 81 TABLET CHEWABLE at 09:05

## 2019-05-03 RX ADMIN — LORAZEPAM 1 MG: 2 INJECTION INTRAMUSCULAR; INTRAVENOUS at 11:05

## 2019-05-03 RX ADMIN — FUROSEMIDE 40 MG: 10 INJECTION, SOLUTION INTRAMUSCULAR; INTRAVENOUS at 06:05

## 2019-05-03 RX ADMIN — FUROSEMIDE 40 MG: 10 INJECTION, SOLUTION INTRAMUSCULAR; INTRAVENOUS at 01:05

## 2019-05-03 RX ADMIN — LIDOCAINE HYDROCHLORIDE 10 ML: 20 INJECTION, SOLUTION INFILTRATION; PERINEURAL at 10:05

## 2019-05-03 RX ADMIN — GABAPENTIN 300 MG: 300 CAPSULE ORAL at 07:05

## 2019-05-03 RX ADMIN — HYDROMORPHONE HYDROCHLORIDE 1 MG: 1 INJECTION, SOLUTION INTRAMUSCULAR; INTRAVENOUS; SUBCUTANEOUS at 11:05

## 2019-05-03 RX ADMIN — ALLOPURINOL 200 MG: 100 TABLET ORAL at 09:05

## 2019-05-03 RX ADMIN — SEVELAMER CARBONATE 800 MG: 800 TABLET, FILM COATED ORAL at 07:05

## 2019-05-03 RX ADMIN — SERTRALINE HYDROCHLORIDE 50 MG: 50 TABLET ORAL at 09:05

## 2019-05-03 RX ADMIN — DEXAMETHASONE SODIUM PHOSPHATE 40 MG: 4 INJECTION, SOLUTION INTRAMUSCULAR; INTRAVENOUS at 05:05

## 2019-05-03 RX ADMIN — AMLODIPINE BESYLATE 5 MG: 5 TABLET ORAL at 09:05

## 2019-05-03 NOTE — ASSESSMENT & PLAN NOTE
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    - Transfuse < 7 hgb

## 2019-05-03 NOTE — SUBJECTIVE & OBJECTIVE
Past Medical History:   Diagnosis Date    Acute respiratory failure with hypoxia 4/30/2019    FUENTES (acute kidney injury) 5/1/2019    Allergy     Anemia     Aortic aneurysm     Breast cancer 10/2011    left breast Stage 0 DCIS    Chronic diastolic congestive heart failure 11/6/2015    Colon polyp     GERD (gastroesophageal reflux disease)     History of colonic polyps     HX: breast cancer     Hyperlipemia     Hypertension     ICH (intracerebral hemorrhage)     Major depressive disorder, single episode, mild 6/23/2016    Nuclear sclerosis 7/21/2014    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 7/21/2014    PAD (peripheral artery disease) 11/6/2015    Stroke 4/2011       Past Surgical History:   Procedure Laterality Date    APPENDECTOMY      BIOPSY LIVER AND ULTRASOUND N/A 4/6/2015    Performed by [facility] at [facility] 2ND FLR    BREAST BIOPSY Left 10/2011    BREAST LUMPECTOMY Left 2011    DCIS    COLONOSCOPY      COLONOSCOPY N/A 3/26/2015    Performed by Wich[facility] Woodard [facility] at [facility] (4TH FLR)    CYST REMOVAL      back    ESOPHAGOGASTRODUODENOSCOPY  07/2016    duodenal angioectasia    ESOPHAGOGASTRODUODENOSCOPY (EGD) N/A 7/27/2016    Performed by Malik Sanchez MD at [facility] END[facility] (4TH FLR)    FOOT FRACTURE SURGERY  10/2012    right foot, with R hallux valgus repair    FRACTURE SURGERY Right     broken toe repiar    HEMORRHOID SURGERY      LIVER BIOPSY      TUBAL LIGATION      UPPER GASTROINTESTINAL ENDOSCOPY         Review of patient's allergies indicates:   Allergen Reactions    Pravastatin Other (See Comments)     Itching, elevated cpk, muscle aches       No current facility-administered medications on file prior to encounter.      Current Outpatient Medications on File Prior to Encounter   Medication Sig    acetaminophen (TYLENOL) 650 MG TbSR Take 1,300 mg by mouth daily as needed (pain).    alirocumab (PRALUENT PEN) 75 mg/mL PnIj  INJECT 75MG UNDER THE SKIN EVERY TWO WEEKS    amLODIPine (NORVASC) 5 MG tablet TAKE 1 TABLET BY MOUTH EVERY DAY    aspirin 81 mg Tab Take 1 tablet by mouth Daily.    bisacodyl (DULCOLAX) 5 mg EC tablet Take 5 mg by mouth once daily.    ferrous gluconate 324 mg (37.5 mg iron) Tab TAKE 1 TABLET BY MOUTH 2 (TWO) TIMES DAILY WITH MEALS.    gabapentin (NEURONTIN) 300 MG capsule TAKE 1 CAPSULE (300 MG TOTAL) BY MOUTH 2 (TWO) TIMES DAILY.    losartan-hydrochlorothiazide 100-25 mg (HYZAAR) 100-25 mg per tablet TAKE 1 TABLET BY MOUTH ONCE DAILY.    omeprazole (PRILOSEC) 40 MG capsule TAKE 1 CAPSULE (40 MG TOTAL) BY MOUTH ONCE DAILY.    sertraline (ZOLOFT) 50 MG tablet TAKE 1 TABLET (50 MG TOTAL) BY MOUTH ONCE DAILY.    traMADol (ULTRAM) 50 mg tablet Take 1 tablet (50 mg total) by mouth 2 (two) times daily as needed for Pain.    vitamin D 1000 units Tab Take 185 mg by mouth once daily.     Family History     Problem Relation (Age of Onset)    Breast cancer Sister (62)    Cancer Sister (63), Father    Fibroids Daughter    Hypertension Daughter, Son, Maternal Grandmother    No Known Problems Sister, Mother, Brother, Brother, Maternal Aunt, Maternal Uncle, Paternal Aunt, Paternal Uncle, Maternal Grandfather, Paternal Grandmother, Paternal Grandfather        Tobacco Use    Smoking status: Former Smoker     Packs/day: 0.50     Years: 20.00     Pack years: 10.00     Types: Cigarettes     Last attempt to quit: 2011     Years since quittin.0    Smokeless tobacco: Former User     Quit date: 4/3/2011   Substance and Sexual Activity    Alcohol use: Yes     Comment: on occasion - one glass wine every 3 months    Drug use: No    Sexual activity: Yes     Partners: Male     Birth control/protection: Post-menopausal     Review of Systems   Constitutional: Negative for activity change, chills, fatigue, fever and unexpected weight change.   HENT: Negative for congestion.    Eyes: Positive for pain. Negative for visual  disturbance.   Respiratory: Positive for chest tightness. Negative for cough, choking, shortness of breath, wheezing and stridor.    Cardiovascular: Positive for chest pain. Negative for palpitations and leg swelling.   Gastrointestinal: Negative for abdominal distention, abdominal pain, blood in stool, constipation, diarrhea, nausea and vomiting.   Endocrine: Negative for polyuria.   Genitourinary: Negative for difficulty urinating, dysuria, flank pain, frequency, menstrual problem, pelvic pain, urgency, vaginal bleeding and vaginal discharge.   Musculoskeletal: Negative for arthralgias and gait problem.   Skin: Positive for pallor. Negative for rash and wound.   Neurological: Positive for speech difficulty. Negative for dizziness, tremors, seizures, facial asymmetry, weakness, numbness and headaches.   Hematological: Does not bruise/bleed easily.   Psychiatric/Behavioral: Positive for confusion. Negative for agitation, behavioral problems and decreased concentration.     Objective:     Vital Signs (Most Recent):  Temp: 98.1 °F (36.7 °C) (05/02/19 2025)  Pulse: 88 (05/02/19 2025)  Resp: 18 (05/02/19 2025)  BP: 119/70 (05/02/19 2025)  SpO2: (!) 92 % (05/02/19 2025) Vital Signs (24h Range):  Temp:  [97.7 °F (36.5 °C)-98.1 °F (36.7 °C)] 98.1 °F (36.7 °C)  Pulse:  [81-93] 88  Resp:  [15-18] 18  SpO2:  [90 %-99 %] 92 %  BP: (110-121)/(57-70) 119/70     Weight: 73.9 kg (162 lb 14.7 oz)  Body mass index is 28.86 kg/m².    Physical Exam   Constitutional: She is oriented to person, place, and time. She appears well-developed and well-nourished. No distress.   Dysarthria with hesitancy in speech noted- residual effect from previous CVA    HENT:   Head: Normocephalic and atraumatic.   Eyes: Pupils are equal, round, and reactive to light. EOM are normal.   Neck: Normal range of motion. Neck supple. No JVD present. No thyromegaly present.   Cardiovascular: Normal rate, normal heart sounds and intact distal pulses. Exam reveals  no gallop and no friction rub.   No murmur heard.  Pulmonary/Chest: Effort normal and breath sounds normal. No stridor. No respiratory distress. She has no wheezes. She has no rales. She exhibits no tenderness.   Abdominal: Soft. Bowel sounds are normal. She exhibits no distension. There is no tenderness. There is no guarding.   Musculoskeletal: Normal range of motion.   Neurological: She is alert and oriented to person, place, and time. She displays normal reflexes. No cranial nerve deficit or sensory deficit. She exhibits normal muscle tone. Coordination normal.   Residual intention tremor of R hand from previous CVA   Skin: Skin is warm and dry. Capillary refill takes less than 2 seconds. She is not diaphoretic.   Psychiatric: She has a normal mood and affect. Her behavior is normal. Judgment and thought content normal.         CRANIAL NERVES     CN III, IV, VI   Pupils are equal, round, and reactive to light.  Extraocular motions are normal.        Significant Labs: All pertinent labs within the past 24 hours have been reviewed.    Significant Imaging: I have reviewed and interpreted all pertinent imaging results/findings within the past 24 hours.

## 2019-05-03 NOTE — MEDICAL/APP STUDENT
History     CC:  68 yo female with hx of breast cancer and HTN presenting to the ED with progressive SOB for 2 weeks duration.    HPI:  SOB started 2 weeks ago and progressively worsened. Pt went to Dr. Arevalo's clinic and was advised to go to the ED. The SOB worsened upon exertion and lying down, and relieved by rest and sitting up. She also reports  having had palpitations and occasional coughs with white mucus and runny nose starting 2 weeks ago. Pt thinks her SOB is due to recent stress from taking care of her sick . Pt also complains of occasional abdominal swelling and pain and left-sided flank pain.    Pt has a hx of stroke in 2008 and 2011. Pt denies chest pain, syncope, fever/chills, sore throat, hx of heart or lung disease. She was a long-term smoker (3 packs/day) for 20 years (quit in 2008).     Interval Hx:  No acute event overnight. Pt still feels SOB and needs O2 NC (on 3L this morning).  No other complaints.  According to her , she has intermittent mild confusion (not registering questions & answers quickly) since her stroke in 2008 and 2011.       Past Medical History:   Diagnosis Date    Acute respiratory failure with hypoxia 4/30/2019    FUENTES (acute kidney injury) 5/1/2019    Allergy     Anemia     Aortic aneurysm     Breast cancer 10/2011    left breast Stage 0 DCIS    Chronic diastolic congestive heart failure 11/6/2015    Colon polyp     GERD (gastroesophageal reflux disease)     History of colonic polyps     HX: breast cancer     Hyperlipemia     Hypertension     ICH (intracerebral hemorrhage)     Major depressive disorder, single episode, mild 6/23/2016    Nuclear sclerosis 7/21/2014    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 7/21/2014    PAD (peripheral artery disease) 11/6/2015    Stroke 4/2011       Past Surgical History:   Procedure Laterality Date    APPENDECTOMY      BIOPSY LIVER AND ULTRASOUND N/A 4/6/2015    Performed by  [facility] OR 2ND FLR    BREAST BIOPSY Left 10/2011    BREAST LUMPECTOMY Left 2011    DCIS    COLONOSCOPY      COLONOSCOPY N/A 3/26/2015    Performed by [facility] ENDO (4TH FLR)    CYST REMOVAL      back    ESOPHAGOGASTRODUODENOSCOPY  07/2016    duodenal angioectasia    ESOPHAGOGASTRODUODENOSCOPY (EGD) N/A 7/27/2016    Performed by Malik Sanchez MD [facility] ENDO (4TH FLR)    FOOT FRACTURE SURGERY  10/2012    right foot, with R hallux valgus repair    FRACTURE SURGERY Right     broken toe repiar    HEMORRHOID SURGERY      LIVER BIOPSY      TUBAL LIGATION      UPPER GASTROINTESTINAL ENDOSCOPY         Family History   Problem Relation Age of Onset    No Known Problems Sister     Cancer Sister 63        Lung Cancer    No Known Problems Mother     Cancer Father     No Known Problems Brother     Hypertension Daughter     Fibroids Daughter         uterine    Hypertension Son     Breast cancer Sister 62    No Known Problems Brother     No Known Problems Maternal Aunt     No Known Problems Maternal Uncle     No Known Problems Paternal Aunt     No Known Problems Paternal Uncle     Hypertension Maternal Grandmother     No Known Problems Maternal Grandfather     No Known Problems Paternal Grandmother     No Known Problems Paternal Grandfather     Ovarian cancer Neg Hx     Colon cancer Neg Hx     Tremor Neg Hx     Amblyopia Neg Hx     Blindness Neg Hx     Cataracts Neg Hx     Diabetes Neg Hx     Glaucoma Neg Hx     Macular degeneration Neg Hx     Retinal detachment Neg Hx     Strabismus Neg Hx     Stroke Neg Hx     Thyroid disease Neg Hx     Esophageal cancer Neg Hx     Rectal cancer Neg Hx     Stomach cancer Neg Hx     Ulcerative colitis Neg Hx     Crohn's disease Neg Hx     Irritable bowel syndrome Neg Hx     Celiac disease Neg Hx        Social History     Tobacco Use    Smoking status: Former Smoker     Packs/day: 0.50     Years:  "20.00     Pack years: 10.00     Types: Cigarettes     Last attempt to quit: 2011     Years since quittin.0    Smokeless tobacco: Former User     Quit date: 4/3/2011   Substance Use Topics    Alcohol use: Yes     Comment: on occasion - one glass wine every 3 months    Drug use: No       Review of Systems   Constitutional: Negative.  Negative for chills and fever.   HENT: Positive for rhinorrhea (clear). Negative for sore throat.    Eyes: Negative.    Respiratory: Positive for cough (white mucus) and shortness of breath.    Cardiovascular: Negative for chest pain and leg swelling.   Gastrointestinal: Positive for abdominal distention. Negative for constipation, diarrhea, nausea and vomiting.   Endocrine: Negative.    Genitourinary: Positive for flank pain (left). Negative for dysuria, frequency and hematuria.   Skin: Negative.    Allergic/Immunologic: Negative.    Neurological: Negative.  Negative for dizziness, tremors, seizures, syncope, speech difficulty and headaches.   Hematological: Negative.    Psychiatric/Behavioral: Negative.    All other systems reviewed and are negative.      Physical Exam   /64   Pulse 92   Temp 98.2 °F (36.8 °C)   Resp 18   Ht 5' 3" (1.6 m)   Wt 77.5 kg (170 lb 13.7 oz)   LMP  (LMP Unknown)   SpO2 96%   Breastfeeding? No   BMI 30.27 kg/m²     Physical Exam    Constitutional: She appears well-developed and well-nourished.   Cannot speak in full sentences due to SOB.   HENT:   Head: Normocephalic and atraumatic.   Eyes: Conjunctivae and EOM are normal. Pupils are equal, round, and reactive to light.   Neck: Normal range of motion. Neck supple. No JVD present.   Cardiovascular: Bradycardia present.  Exam reveals gallop and S3. Exam reveals no friction rub.    No murmur heard.  Heart beats drop every 3rd beat.   Pulmonary/Chest: She has decreased breath sounds. She has no wheezes. She has rales (R lower lung). She exhibits no tenderness.   Abdominal: She exhibits " "distension. There is tenderness (diffuse, worst on L side). There is no rebound and negative Dewey's sign.   Genitourinary:   Genitourinary Comments: Left-sided flank pain   Musculoskeletal: Normal range of motion. She exhibits no edema.   Neurological: She is alert and oriented to person, place, and time.   Psychiatric: She has a normal mood and affect. Her behavior is normal. Judgment and thought content normal.         ED Course     CTA chest:  negative for PE.   "Patchy ground-glass opacities throughout the lungs", may represent edema. Further evaluation with non-contrast CT.     CXR:  "Heart is enlarged.  Slight accentuation of the basilar interstitial markings identified.  No significant airspace consolidation or pleural effusion identified"     MRI brain w/o contrast:  "No evidence of acute infarction.  Scattered areas of susceptibility weighted artifact in the basal ganglia and thalami.  These may represent chronic micro-hemorrhages.  Susceptibility weighted artifact layering along the right tentorium.  Component of acute subdural blood be difficult to exclude.  Old micro-hemorrhage in the left high frontal lobe."      Assessment & Plan:    Acute decompensated CHF:   - Sx of progressive SOB relieved by rest, orthopnea, S3 sound, and CXR shows evidence of HF: enlarged heart, interstitial markings.   - elevated BNP (1070) confirms diagnosis of CHF.  - continue diuretic furosemide IV 20mg  - will add on BB metoprolol when pt is stable.      FUENTES:  - FUENTES can be caused from tumor lysis syndrome or hypoperfusion of kidney due to CHF.    - Pt has hx of breast cancer.   - low albumin but high serum protein. Protein gap 9.8.   - elevated uric acid, phosphate and Cr.  Uric acid > 8, phos > 4.5.  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  - high uric acid level could cause FUENTES due to crystal deposition in renal tubule.    Nephrology Recommendation:  DDX is that FUENTES is coming from tumor " lysis syndrome.   - Phos is elevated (5.8). Continue Sevelermer TID with meals.  - Renal diet  - In/Out :  Urine output today is 1100cc + 1x unmeasured.  - Renal dose medication (Gabapentin)  - Renal diet  - Continuous NS 75ml with furosemide to flush out uric acid.   - Start Rasburicase to bring down uric acid level  - Get LDH level   - Get urine protein/Cr ratio   - Order SPEP/UPEP (done my heme/onc)  - Recommend Bone Marrow Biopsy  - continue trend BMP for increasing K+ and Phos.    - Collect 24h urine sample for protein       Multiple liver nodules concerning for metastasis:  - She was evaluated by Hepatology 3 years ago for elevated LFTs. Serological workup was negative for Gino's, alpha-1 antitrypsin deficiency, hemochromatosis, autoimmune etiology, viral hepatitis. Thyroid was normal. She had a CT scan to further evaluate her liver mass seen on US.  TPCT showed a focal 1.2cm area of hypodensity wthin hepatic segment VI, which is not isodense on delayed phase imaging. Due to its small size and imaging characteristics, it cannot be classified as a hemangioma and further follow-up could be obtained with MRI in 3-6 months to document stability. Her AFP, CEA, CA 19-9 and  were all normal. She underwent US guided liver biopsy on 4/6/15 that revealed no significant abnormality to explain her elevated transaminases.  - Liver US 04/04/2019 showed innumerable new hepatic lesions concerning for metastatic disease.  Further evaluation with contrast-enhanced CT is recommended.  1.8cm peripancreatic lymph node.  - Previous smoker with 30-40 packs/year history, L breast cancer s/p lumpectomy & radiation therapy -- 6 months ago she had a normal mammogram.   - She had a EGD in 2016 which showed a nonbleeding angio ectasia.     - A colonoscopy in 2015 showed a 5mm hyperplastic polyp. Now with multiple new hepatic lesions concerning for metastatic disease.   - With new onset of intermittent confusion. MRI brain pending  to evaluate possible mets to brain.   - MRI abdo/pelvis pending for further workup.   - Tumor marker labs pending.       CT Abdomen/Pelvis 4/6/2019  Innumerable indeterminate enhancing lesions identified throughout the liver. Recommend further evaluation with MRI liver mass protocol.  Enhancing retroperitoneal soft tissue lesion, likely an enlarged retroperitoneal lymph node.  Findings are concerning for metastasis in the setting of occult malignancy.  Recommend further evaluation with pelvic ultrasound or MRI.  Indeterminate ill-defined hypoattenuating lesion in hepatic segment VI.  Indeterminate arterial enhancing lesion in the spleen.     Recent thickened endometrial stripe  - was recently evaluated by Ob/gyn for incidental finding on CT studies did not warrant further work up due to lack of symptoms.     LIVER BIOPSY in April 2015:  -Very minimal lobular disarray/injury  -Rare macrosteatosis, less than 2%  -No iron  -No fibrosis      Microcytic anemia  - Hb 7.1, MCV 81, ferritin 54.  Order type&cross. Transfuse if Hb < 7.   - pt has been on PO iron supplement.   - No previous transfusion  - hx of angioectasias on EGD, colonoscopy with 1 hyperplastic polyp in 2015.      PVC (premature ventricular contraction)  - irregularity in HR noted in physical exam  - initial ekg showed NSR but repeat revealed PVCs consistent with physical findings  - will monitor for now  - continue telemetry      Budd-Chiari syndrome:  - Sx of abdominal pain and ascites  - hx of stroke: thrombotic state  - anticoagulant  - continue home aspirin 81mg  - monitor LFT and PT, aPTT and INR      Hyperlipidemia, Statin intolerance  - She is intolerant to statins because of rhabdo, liver enzymes have resolved. She was seen and stated on alirocomab- and she is tolerating it well.    - still elevated at 150 with HDL 43, LDL 79.6 . She is working on low-fat, low-cholesterol diet.

## 2019-05-03 NOTE — PROCEDURES
Bone marrow  Date/Time: 5/3/2019 4:24 PM  Performed by: Genny Napoles MD  Authorized by: Genny Napoles MD     Consent Done?:  Yes (Verbal)  Assistants?: No    Anesthesia:  Local infiltration  Local anesthetic:  Lidocaine 2% with epinephrine  Aspiration?: Yes    Biopsy?: Yes    Number of Specimens::  2  Estimated blood loss (cc):  10   Patient tolerated the procedure well with no immediate complications.   Post-operative instructions were provided for the patient.     Two small cores collected, in addition to three other fragments of bone marrow  Nine cc of aspirate were collected as well       Bone marrow biopsy completed, hematological studies indicative of multiple myeloma.    Discussed with Dr. Odonnell.    Genny Napoles MD  Hematology/Oncology Fellow, PGY IV  [facility]

## 2019-05-03 NOTE — ASSESSMENT & PLAN NOTE
Previous smoker with 30-40 pack year history, L breast cancer s/p lumpectomy & RTX, 6 months ago she had a normal mammogram. She had a EGD back in 2016 which showed a nonbleeding angio ectasia.  A colonoscopy back in March 2015 showing a 5 mm hyperplastic polyp. Now with multiple new hepatic lesions concerning for metastatic disease. With new onset of intermittent confusion. MRI brain pending to evaluate possible mets to brain. MRI abdo/pelvis pending for further workup. Tumor marker labs pending.     Given progressive ACEVEDO with elevated est PAP, concern for ovarian origin.     CTPA 05/02/2019  Trace bilateral pleural fluid with adjacent little opacities, likely representing atelectatic changes.  Patchy ground-glass opacities throughout the lungs with possible peripheral reticulations.  These changes are nonspecific and may represent edema versus chronic lung changes.  Further evaluation with dedicated nonemergent conventional noncontrast enhanced chest CT is recommended. Possible compression # at T7     Liver u/s 04/04/2019  Innumerable new hepatic lesions concerning for metastatic disease.  Further evaluation with contrast-enhanced CT is recommended.  1.8 cm peripancreatic lymph node.    CT Abdomen/Pelvis 4/10/2019  Innumerable indeterminate enhancing lesions identified throughout the liver.  No definitive washout on delayed phases.  Findings are indeterminate by CT criteria, metastatic disease is not excluded.  Recommend further evaluation with MRI liver mass protocol.  Enhancing retroperitoneal soft tissue lesion, likely an enlarged retroperitoneal lymph node.  Findings are concerning for metastasis in the setting of occult malignancy.  Thickened endometrium, underlying neoplasm cannot.  Recommend further evaluation with pelvic ultrasound or MRI.  Indeterminate ill-defined hypoattenuating lesion in hepatic segment VI.  Indeterminate arterial enhancing lesion in the spleen.    Recent thickened endometrial  stripe  - was recently evaluated by Ob/gyn for incidental finding on CT studies did not warrant further work up due to lack of symptoms symptoms     She was evaluated by hepatology 3 years ago for mass found on abdominal CT. Per chart review it was a f/u for evaluation of elevated liver enzymes. She has undergone an extensive workup for her elevated liver enzymes. Serological workup was negative for Gino's, alpha-1 antitrypsin deficiency, hemochromatosis, autoimmune etiology, and viral hepatitis. Thyroid was normal. She had a CT scan to further evaluate her liver lesion seen on u/s. TPCT showed a focal 1.2-cm area of hypodensity identified within hepatic segment VI, which is not isodense on delayed phase imaging. Due its small size and imaging characteristics, it cannot be classified as a hemangioma and further follow-up, if clinically warranted, could be obtained with MRI in 3 to 6 months to document stability. Her AFP, CEA, CA 19-9, and  were all normal. She underwent u/s guided liver biopsy on 4/6/15 that revealed no significant abnormality to explain her elevated transaminases.  FINAL PATHOLOGIC DIAGNOSIS  LIVER (NEEDLE BIOPSY)  -Very minimal lobular disarray/injury  -Rare macrosteatosis, less than 2%  -No iron  -No fibrosis

## 2019-05-03 NOTE — ASSESSMENT & PLAN NOTE
Crackles heard on physical exam, postiive for orthopnea, progressive SOB, BNP 1,070,   -holding Lasix today in setting of worsening FUENTES & clinically better  -Strict Is/Os & daily weights  -Fluid & Na restriction & cardiac diet  -repeated TTE showing EF 55%    RESOLVED    TTE of 9/18 revealing   1 - Normal left ventricular systolic function (EF 55-60%).     2 - Wall motion abnormalities.     3 - Mildly enlarged ascending aorta.     4 - Impaired LV relaxation, elevated LAP (grade 2 diastolic dysfunction).     5 - Normal right ventricular systolic function .     6 - Mild mitral regurgitation.     7 - Mild to moderate tricuspid regurgitation.     8 - Pulmonary hypertension. The estimated PA systolic pressure is 41 mmHg.

## 2019-05-03 NOTE — ASSESSMENT & PLAN NOTE
Exact source unknown, history of diastolic dysfunction with elevated PAP, currently undergoing w/u for underlying malignancy  CTPA negative for PE, ground glass opacities found - suggesting high resolution CT   History of COPD    Resolved with diuresis

## 2019-05-03 NOTE — ASSESSMENT & PLAN NOTE
Unclear clinically, complicated by TLS and metastatic malignancy. Will monitor with gentle hydration and start BiPAP if acute worsening resp distress.

## 2019-05-03 NOTE — ASSESSMENT & PLAN NOTE
- reported dark brown stool during hospital admission  -Hb 7.1 with history of severe Fe deficiency on Fe supplements came up to 10s  -protonix po for now, will continue to monitor

## 2019-05-03 NOTE — PROGRESS NOTES
[facility] Hospital Medicine  Progress Note    Patient Name: Shelby Thompson  MRN: [id number]  Patient Class: IP- Inpatient   Admission Date: 4/30/2019  Length of Stay: 3 days  Attending Physician: Indira Rodrigez MD  Primary Care Provider: Emanuel Arevalo MD    [facility] Medicine Team: [facility] HOSP MED 5 Jere Carrasco MD    Subjective:     Principal Problem:Acute respiratory failure with hypoxia    HPI:  Ms. 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    She was seen by her PCP Dr. Arevalo this morning, who recorded an SaO2 of 87% and sent her to the ED for evaluation of PE. She is also reporting left flank and chest pain. She states that the pain feels like it's under her breast and wraps around her side to her back. She gets relief with tramadol and by sitting on the edge of her recliner chair at home. The pain is constant, but does not radiate. Palpation makes the pain worse. She denies any nausea, vomiting, diaphoresis or worsening SOB with increases in her pain. Patient reports that she used to be able to get around without an issue, but now gets so short of breath going from one room to another that she has to sit down.     Of note, she has a hx of CVA (residual deficits of weakness and tremor on right side). She uses a rollator to get around, and there is no acute change with this. Patient's  states that the PCP was also concerned about her recent CT scan and wanted her to be admitted for continued work up of her abnormal CT scan. Patient denies any issues with bleeding, bruising, or LE edema.    6 months ago she had a normal mammogram.  Will she had a EGD back in 2016 which showed a nonbleeding angio ectasia.  A colonoscopy back in March 2015 showing a 5 mm hyperplastic polyp     Liver u/s 04/04/2019  Innumerable new hepatic lesions concerning for metastatic disease.  Further evaluation with contrast-enhanced CT is recommended.  1.8 cm peripancreatic lymph node.    CT Abdomen/Pelvis 4/10/2019  Innumerable indeterminate enhancing lesions identified throughout the liver.  No definitive washout on delayed phases.  Findings are indeterminate by CT criteria, metastatic disease is not excluded.  Recommend further evaluation with MRI liver mass protocol.  Enhancing retroperitoneal soft tissue lesion, likely an enlarged retroperitoneal lymph node.  Findings are concerning for metastasis in the setting of occult malignancy.  Thickened endometrium, underlying neoplasm cannot.  Recommend further evaluation with pelvic ultrasound or MRI.  Indeterminate ill-defined hypoattenuating lesion in hepatic segment VI.  Indeterminate arterial enhancing lesion in the spleen.      She was evaluated by hepatology 3 years ago for mass found on abdominal CT. Per chart review it was a f/u for evaluation of elevated liver enzymes. She has undergone an extensive workup for her elevated liver enzymes. Serological workup was negative for Gino's, alpha-1 antitrypsin deficiency, hemochromatosis, autoimmune etiology, and viral hepatitis. Thyroid was normal. She had a CT scan to further evaluate her liver lesion seen on u/s. TPCT showed a focal 1.2-cm area of hypodensity identified within hepatic segment VI, which is not isodense on delayed phase imaging. Due its small size and imaging characteristics, it cannot be classified as a hemangioma and further follow-up, if clinically warranted, could be obtained with MRI in 3 to 6 months to document stability. Her AFP, CEA, CA 19-9, and  were all normal. She underwent u/s guided liver biopsy on 4/6/15 that revealed no significant  abnormality to explain her elevated transaminases.  FINAL PATHOLOGIC DIAGNOSIS  LIVER (NEEDLE BIOPSY)  -Very minimal lobular disarray/injury  -Rare macrosteatosis, less than 2%  -No iron  -No fibrosis  Supplemental Diagnosis  This addendum is issued for the results of ancillary studies.  PASD is negative. [place] red stain is negative for amyloid.  Keratin 7 is positive in bile ducts with no alterations (normal pattern staining).  All immunostain and special stain controls reacted appropriately.    Hospital Course:  Interval Hx: NAEO, she is now om room air.  reports intermittent confusion but it is her baseline     Interval History: Nephrology seen overnight, concern for TLS, Receiving Bone marrow today.  Otherwise she reports subjective improvement in SOB.  Still reports left sided abdominal cramping.  Tolerating PO and having BMs    Review of Systems   Constitutional: Negative for chills and fever.   Respiratory: Negative for shortness of breath and wheezing.    Cardiovascular: Negative for chest pain and palpitations.   Gastrointestinal: Positive for abdominal pain.   Genitourinary: Negative for dysuria.     Objective:     Vital Signs (Most Recent):  Temp: 97.6 °F (36.4 °C) (05/03/19 1549)  Pulse: 85 (05/03/19 1549)  Resp: 18 (05/03/19 1549)  BP: (!) 144/63 (05/03/19 1549)  SpO2: 97 % (05/03/19 1549) Vital Signs (24h Range):  Temp:  [97.3 °F (36.3 °C)-98.2 °F (36.8 °C)] 97.6 °F (36.4 °C)  Pulse:  [79-92] 85  Resp:  [18] 18  SpO2:  [92 %-97 %] 97 %  BP: (101-144)/(57-76) 144/63     Weight: 77.5 kg (170 lb 13.7 oz)  Body mass index is 30.27 kg/m².    Intake/Output Summary (Last 24 hours) at 5/3/2019 1650  Last data filed at 5/3/2019 1447  Gross per 24 hour   Intake 785 ml   Output 800 ml   Net -15 ml      Physical Exam   Constitutional: She is oriented to person, place, and time. She appears well-developed and well-nourished. No distress.   Dysarthria with hesitancy in speech noted- residual effect from  previous CVA    HENT:   Head: Normocephalic and atraumatic.   Eyes: Pupils are equal, round, and reactive to light. EOM are normal.   Neck: Normal range of motion. Neck supple.   Cardiovascular: Normal rate, normal heart sounds and intact distal pulses.   No murmur heard.  Pulmonary/Chest: Effort normal. No respiratory distress. She has rales.   Abdominal: Soft. Bowel sounds are normal. She exhibits no distension. There is no tenderness. There is no guarding.   Musculoskeletal: Normal range of motion. She exhibits no edema.   Neurological: She is alert and oriented to person, place, and time.   Residual intention tremor of R hand from previous CVA   Skin: Skin is warm and dry. Capillary refill takes less than 2 seconds. She is not diaphoretic.   Psychiatric: She has a normal mood and affect. Her behavior is normal. Judgment and thought content normal.       Significant Labs: All pertinent labs within the past 24 hours have been reviewed.    Significant Imaging: I have reviewed all pertinent imaging results/findings within the past 24 hours.    Assessment/Plan:      * Acute respiratory failure with hypoxia  Complicated by COPD resolving with diuresis however now complicated by TLS.  - CTPA negative for PE, ground glass opacities found , can sondier following up with high resolution CT         Multiple lesions of metastatic malignancy  Previous history of breast cancer and workup for liver lesions years ago now presenting with protein gap and TLS suggestive of leukemia or Multiple myeloma.  Appreciate assistance from oncology, bone marrow biopsy pending   - Will give Dex 40 x 4 days per oncology recs     Acute on chronic diastolic heart failure  Unclear clinically, complicated by TLS and metastatic malignancy. Will monitor with gentle hydration and start BiPAP if acute worsening resp distress.      Gammopathy  Worsening FUENTES with elevated protein gap, proteinuria  tonya ca of 10.6  Significantly elevated immunoglobulins    T7 concerning for lytic #     Nephrology & heme onc consulted, appreciate recs  -suspicion for MM & TLS  -started on allopurinol & light hydration in setting of s/p CHF exacerbation   -spep & upep pending   -hemolysis labs   -possible bone marrow bx tomorrow       FUENTES (acute kidney injury)  Nephrology on board appreciate assistance.  Labs suggestive of TLS with unknown malignancy currently be worked up by Oncology with bone marrow biopsy pending.  - gentle hydration with 75 CC and lasix 40 BID   - Allopurinol   - BMP BID       Acute anemia  In setting of suspected malignancy likely of a dysplastic nature, Multiple myeloma or leukemia etc.    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

## 2019-05-03 NOTE — ASSESSMENT & PLAN NOTE
Pt with history of breast cancer. CT and US suggested malignancy. Patient's has low albumin but high serum protein. Extra protein might be coming from malignant etiology. FUENTES can be caused from MM/tumor lysis syndrome vs hypoperfusion of kidney.  Patient also has high uric acid level which could cause FUENTES due to crystal deposition in renal tubule.      Recommendation  DDX is that FUENTES is coming from tumor lysis syndrome, tumor burden.   - Sevelermer TID with meal  - Renal diet  - In/Out  - Renally dose medication (Gabapentin)  - Renal diet  - Recommend starting Continuous NS 75ml of fluid with lasix use to flush out uric acid.   - Recommend starting Rasburicase and alluprinol to bring down uric acid level  - Get urine protein/Cr ratio   - Order SPEP/UPEP  - Bone Marrow Bx today  - continue trend BMP for increasing K+ and Phos  - Collect 24 hour urine sample for protein stop at 5 AM tomorrow  - We will spin urine to visualized under microscope

## 2019-05-03 NOTE — PLAN OF CARE
Problem: Adult Inpatient Plan of Care  Goal: Patient-Specific Goal (Individualization)  Plan of care discussed with patient. Patient is free of fall/trauma/injury. Denies CP, SOB, or pain/discomfort. Bone marrow biopsy done at bedside today - tolerated well, site CDI, no bleeding or hematoma. IV fluids maintained per order. All questions addressed. Will continue to monitor

## 2019-05-03 NOTE — PROGRESS NOTES
[facility]  Nephrology  Progress Note    Patient Name: Shelby Thompson  MRN: [id number]  Admission Date: 4/30/2019  Hospital Length of Stay: 3 days  Attending Provider: Indira Rodrigez MD   Primary Care Physician: Emanuel Arevalo MD  Principal Problem:Acute respiratory failure with hypoxia    Subjective:     HPI: Ms. 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  She is also reporting left flank and chest pain. PCP ordered CT and it showed enhancing retroperitoneal soft tissue lesion, likely an enlarged retroperitoneal lymph node. TVUS showed thickening of endometrium. Findings are concerning for metastasis in the setting of occult malignancy. She gets relief with tramadol and by sitting on the edge of her recliner chair at home. She denies any nausea, vomiting, diaphoresis or worsening SOB with increases in her pain. In the hospital patient was diagnosed with HF and was treated with once 20 mg PO lasix and twice 40 mg IV lasix. Her SCr started to trending up since then. Patient is making urine and does not complain about dysuria, trouble urinating, no NSAID use. Nephrology was consulted for work up of FUENTES.           Interval History: NAEON, patient rested well and feeling better this morning. Pain on her left side is reduced but did not disappear. Patient is urinating well under lasix and continuous fluid regiment. Patient denied fever chill n,v, and abdominal ache.     Review of patient's allergies indicates:   Allergen Reactions    Pravastatin Other (See Comments)     Itching, elevated cpk, muscle aches     Current  Facility-Administered Medications   Medication Frequency    0.9%  NaCl infusion Continuous    acetaminophen tablet 650 mg Q8H PRN    albuterol-ipratropium 2.5 mg-0.5 mg/3 mL nebulizer solution 3 mL Q6H PRN    allopurinol tablet 200 mg Daily    amLODIPine tablet 5 mg Daily    aspirin chewable tablet 81 mg Daily    dextrose 50% injection 12.5 g PRN    dextrose 50% injection 25 g PRN    gabapentin capsule 300 mg BID    glucagon (human recombinant) injection 1 mg PRN    glucose chewable tablet 16 g PRN    glucose chewable tablet 24 g PRN    heparin (porcine) injection 5,000 Units Q8H    HYDROmorphone injection 1 mg Once    lidocaine HCL 20 mg/ml (2%) injection 10 mL Once    lorazepam injection 1 mg Once    pantoprazole EC tablet 40 mg Daily    sertraline tablet 50 mg Daily    sevelamer carbonate tablet 800 mg TID WM    sodium chloride 0.9% flush 10 mL PRN    traMADol tablet 50 mg BID PRN       Objective:     Vital Signs (Most Recent):  Temp: 98.2 °F (36.8 °C) (05/03/19 0716)  Pulse: 92 (05/03/19 0729)  Resp: 18 (05/03/19 0716)  BP: 101/64 (05/03/19 0716)  SpO2: 96 % (05/03/19 0716)  O2 Device (Oxygen Therapy): nasal cannula (05/02/19 1655) Vital Signs (24h Range):  Temp:  [97.3 °F (36.3 °C)-98.2 °F (36.8 °C)] 98.2 °F (36.8 °C)  Pulse:  [82-93] 92  Resp:  [18] 18  SpO2:  [90 %-96 %] 96 %  BP: (101-119)/(57-70) 101/64     Weight: 77.5 kg (170 lb 13.7 oz) (05/03/19 0519)  Body mass index is 30.27 kg/m².  Body surface area is 1.86 meters squared.    I/O last 3 completed shifts:  In: 1565 [P.O.:1565]  Out: 1400 [Urine:1400]    Physical Exam   Constitutional: She is oriented to person, place, and time. She appears well-developed and well-nourished. No distress.   HENT:   Head: Normocephalic and atraumatic.   Neck: Normal range of motion. Neck supple. JVD present.   Cardiovascular: Normal rate, regular rhythm, normal heart sounds and intact distal pulses.   No murmur heard.  Pulmonary/Chest: No respiratory  distress. She has rales.   Decreased in breath  Sound on left lung. Tenderness on left flank   Abdominal: Soft. Bowel sounds are normal. She exhibits distension. There is no tenderness.   Musculoskeletal: She exhibits tenderness. She exhibits no edema or deformity.   Neurological: She is alert and oriented to person, place, and time. No cranial nerve deficit.   Skin: Skin is warm and dry.   Psychiatric: She has a normal mood and affect. Her behavior is normal.       Significant Labs:  CBC:   Recent Labs   Lab 05/03/19  0616   WBC 4.90   RBC 2.94*   HGB 7.1*   HCT 23.6*      MCV 80*   MCH 24.1*   MCHC 30.1*     CMP:   Recent Labs   Lab 05/03/19  0616   GLU 95   CALCIUM 9.6   ALBUMIN 2.7*   PROT 13.5*   *   K 4.2   CO2 28      BUN 31*   CREATININE 2.5*   ALKPHOS 57   ALT 11   AST 14   BILITOT 0.3     All labs within the past 24 hours have been reviewed.     Significant Imaging:  US: Reviewed Right kidney: The right kidney measures 10.5 cm. No cortical thinning. No loss of corticomedullary distinction.  Decreased perfusion. Resistive index measures 0.87. No mass. No renal stone. No hydronephrosis.    Left kidney: The left kidney measures 10.5 cm. No cortical thinning. No loss of corticomedullary distinction.  Decreased perfusion. Resistive index measures 0.86. No mass. No renal stone. No hydronephrosis.    Assessment/Plan:     FUENTES (acute kidney injury)  Pt with history of breast cancer. CT and US suggested malignancy. Patient's has low albumin but high serum protein. Extra protein might be coming from malignant etiology. FUENTES can be caused from MM/tumor lysis syndrome vs hypoperfusion of kidney.  Patient also has high uric acid level which could cause FUENTES due to crystal deposition in renal tubule.      Recommendation  DDX is that FUENTES is coming from tumor lysis syndrome, tumor burden.   - Sevelermer TID with meal  - Renal diet  - In/Out  - Renally dose medication (Gabapentin)  - Renal diet  - Recommend  starting Continuous NS 75ml of fluid with lasix use to flush out uric acid.   - Recommend starting Rasburicase and alluprinol to bring down uric acid level  - Get urine protein/Cr ratio   - Order SPEP/UPEP  - Bone Marrow Bx today  - continue trend BMP for increasing K+ and Phos  - Collect 24 hour urine sample for protein stop at 5 AM tomorrow  - Urine under microscope showed many WBC and calcium oxylate crystals.           Thank you for your consult. I will follow-up with patient. Please contact us if you have any additional questions.    ZOE Calvin MD  Nephrology  [facility]

## 2019-05-03 NOTE — PLAN OF CARE
Problem: Adult Inpatient Plan of Care  Goal: Plan of Care Review  Plan of care discussed with patient. Patient is free of fall/trauma/injury. Denies CP, SOB, or pain/discomfort. NS gtt cont per MAR. Lasix IVP 40 mg given once.  Urine collected and left at bedside per MD orders. VS stable and no acute distress overnight.  All questions addressed. Will continue to monitor.

## 2019-05-03 NOTE — ASSESSMENT & PLAN NOTE
Previous history of breast cancer and workup for liver lesions years ago now presenting with protein gap and TLS suggestive of leukemia or Multiple myeloma.  Appreciate assistance from oncology, bone marrow biopsy pending   - Will give Dex 40 x 4 days per oncology recs

## 2019-05-03 NOTE — SUBJECTIVE & OBJECTIVE
Interval History: NAEON, patient rested well and feeling better this morning. Pain on her left side is reduced but did not disappear. Patient is urinating well under lasix and continuous fluid regiment. Patient denied fever chill n,v, and abdominal ache.     Review of patient's allergies indicates:   Allergen Reactions    Pravastatin Other (See Comments)     Itching, elevated cpk, muscle aches     Current Facility-Administered Medications   Medication Frequency    0.9%  NaCl infusion Continuous    acetaminophen tablet 650 mg Q8H PRN    albuterol-ipratropium 2.5 mg-0.5 mg/3 mL nebulizer solution 3 mL Q6H PRN    allopurinol tablet 200 mg Daily    amLODIPine tablet 5 mg Daily    aspirin chewable tablet 81 mg Daily    dextrose 50% injection 12.5 g PRN    dextrose 50% injection 25 g PRN    gabapentin capsule 300 mg BID    glucagon (human recombinant) injection 1 mg PRN    glucose chewable tablet 16 g PRN    glucose chewable tablet 24 g PRN    heparin (porcine) injection 5,000 Units Q8H    HYDROmorphone injection 1 mg Once    lidocaine HCL 20 mg/ml (2%) injection 10 mL Once    lorazepam injection 1 mg Once    pantoprazole EC tablet 40 mg Daily    sertraline tablet 50 mg Daily    sevelamer carbonate tablet 800 mg TID WM    sodium chloride 0.9% flush 10 mL PRN    traMADol tablet 50 mg BID PRN       Objective:     Vital Signs (Most Recent):  Temp: 98.2 °F (36.8 °C) (05/03/19 0716)  Pulse: 92 (05/03/19 0729)  Resp: 18 (05/03/19 0716)  BP: 101/64 (05/03/19 0716)  SpO2: 96 % (05/03/19 0716)  O2 Device (Oxygen Therapy): nasal cannula (05/02/19 1655) Vital Signs (24h Range):  Temp:  [97.3 °F (36.3 °C)-98.2 °F (36.8 °C)] 98.2 °F (36.8 °C)  Pulse:  [82-93] 92  Resp:  [18] 18  SpO2:  [90 %-96 %] 96 %  BP: (101-119)/(57-70) 101/64     Weight: 77.5 kg (170 lb 13.7 oz) (05/03/19 0519)  Body mass index is 30.27 kg/m².  Body surface area is 1.86 meters squared.    I/O last 3 completed shifts:  In: 9233  [P.O.:1565]  Out: 1400 [Urine:1400]    Physical Exam   Constitutional: She is oriented to person, place, and time. She appears well-developed and well-nourished. No distress.   HENT:   Head: Normocephalic and atraumatic.   Neck: Normal range of motion. Neck supple. JVD present.   Cardiovascular: Normal rate, regular rhythm, normal heart sounds and intact distal pulses.   No murmur heard.  Pulmonary/Chest: No respiratory distress. She has rales.   Decreased in breath  Sound on left lung. Tenderness on left flank   Abdominal: Soft. Bowel sounds are normal. She exhibits distension. There is no tenderness.   Musculoskeletal: She exhibits tenderness. She exhibits no edema or deformity.   Neurological: She is alert and oriented to person, place, and time. No cranial nerve deficit.   Skin: Skin is warm and dry.   Psychiatric: She has a normal mood and affect. Her behavior is normal.       Significant Labs:  CBC:   Recent Labs   Lab 05/03/19  0616   WBC 4.90   RBC 2.94*   HGB 7.1*   HCT 23.6*      MCV 80*   MCH 24.1*   MCHC 30.1*     CMP:   Recent Labs   Lab 05/03/19  0616   GLU 95   CALCIUM 9.6   ALBUMIN 2.7*   PROT 13.5*   *   K 4.2   CO2 28      BUN 31*   CREATININE 2.5*   ALKPHOS 57   ALT 11   AST 14   BILITOT 0.3     All labs within the past 24 hours have been reviewed.     Significant Imaging:  US: Reviewed Right kidney: The right kidney measures 10.5 cm. No cortical thinning. No loss of corticomedullary distinction.  Decreased perfusion. Resistive index measures 0.87. No mass. No renal stone. No hydronephrosis.    Left kidney: The left kidney measures 10.5 cm. No cortical thinning. No loss of corticomedullary distinction.  Decreased perfusion. Resistive index measures 0.86. No mass. No renal stone. No hydronephrosis.

## 2019-05-03 NOTE — ASSESSMENT & PLAN NOTE
Nephrology on board appreciate assistance.  Labs suggestive of TLS with unknown malignancy currently be worked up by Oncology with bone marrow biopsy pending.  - gentle hydration with 75 CC and lasix 40 BID   - Allopurinol   - BMP BID

## 2019-05-03 NOTE — PROGRESS NOTES
[facility] Hospital Medicine  Progress Note    Patient Name: Shelby Thompson  MRN: [id number]  Patient Class: IP- Inpatient   Admission Date: 4/30/2019  Length of Stay: 2 days  Attending Physician: Indira Rodrigez MD  Primary Care Provider: Emanuel Arevalo MD    [facility] Medicine Team: [facility]OSP MED 5 Velma Joshi MD    Subjective:     Principal Problem:Acute respiratory failure with hypoxia    HPI:  Ms. 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    She was seen by her PCP Dr. Arevalo this morning, who recorded an SaO2 of 87% and sent her to the ED for evaluation of PE. She is also reporting left flank and chest pain. She states that the pain feels like it's under her breast and wraps around her side to her back. She gets relief with tramadol and by sitting on the edge of her recliner chair at home. The pain is constant, but does not radiate. Palpation makes the pain worse. She denies any nausea, vomiting, diaphoresis or worsening SOB with increases in her pain. Patient reports that she used to be able to get around without an issue, but now gets so short of breath going from one room to another that she has to sit down.     Of note, she has a hx of CVA (residual deficits of weakness and tremor on right side). She uses a rollator to get around, and there is no acute change with this. Patient's  states that the PCP was also concerned about her recent CT scan and wanted her to be admitted for continued work up of her abnormal CT scan. Patient denies any issues with bleeding, bruising, or LE edema.    6 months ago she had a normal mammogram. Will  she had a EGD back in 2016 which showed a nonbleeding angio ectasia.  A colonoscopy back in March 2015 showing a 5 mm hyperplastic polyp     Liver u/s 04/04/2019  Innumerable new hepatic lesions concerning for metastatic disease.  Further evaluation with contrast-enhanced CT is recommended.  1.8 cm peripancreatic lymph node.    CT Abdomen/Pelvis 4/10/2019  Innumerable indeterminate enhancing lesions identified throughout the liver.  No definitive washout on delayed phases.  Findings are indeterminate by CT criteria, metastatic disease is not excluded.  Recommend further evaluation with MRI liver mass protocol.  Enhancing retroperitoneal soft tissue lesion, likely an enlarged retroperitoneal lymph node.  Findings are concerning for metastasis in the setting of occult malignancy.  Thickened endometrium, underlying neoplasm cannot.  Recommend further evaluation with pelvic ultrasound or MRI.  Indeterminate ill-defined hypoattenuating lesion in hepatic segment VI.  Indeterminate arterial enhancing lesion in the spleen.      She was evaluated by hepatology 3 years ago for mass found on abdominal CT. Per chart review it was a f/u for evaluation of elevated liver enzymes. She has undergone an extensive workup for her elevated liver enzymes. Serological workup was negative for Gino's, alpha-1 antitrypsin deficiency, hemochromatosis, autoimmune etiology, and viral hepatitis. Thyroid was normal. She had a CT scan to further evaluate her liver lesion seen on u/s. TPCT showed a focal 1.2-cm area of hypodensity identified within hepatic segment VI, which is not isodense on delayed phase imaging. Due its small size and imaging characteristics, it cannot be classified as a hemangioma and further follow-up, if clinically warranted, could be obtained with MRI in 3 to 6 months to document stability. Her AFP, CEA, CA 19-9, and  were all normal. She underwent u/s guided liver biopsy on 4/6/15 that revealed no significant  abnormality to explain her elevated transaminases.  FINAL PATHOLOGIC DIAGNOSIS  LIVER (NEEDLE BIOPSY)  -Very minimal lobular disarray/injury  -Rare macrosteatosis, less than 2%  -No iron  -No fibrosis  Supplemental Diagnosis  This addendum is issued for the results of ancillary studies.  PASD is negative. [place] red stain is negative for amyloid.  Keratin 7 is positive in bile ducts with no alterations (normal pattern staining).  All immunostain and special stain controls reacted appropriately.    Hospital Course:  Interval Hx: NAEO, she is now om room air.  reports intermittent confusion but it is her baseline     Past Medical History:   Diagnosis Date    Acute respiratory failure with hypoxia 4/30/2019    FUENTES (acute kidney injury) 5/1/2019    Allergy     Anemia     Aortic aneurysm     Breast cancer 10/2011    left breast Stage 0 DCIS    Chronic diastolic congestive heart failure 11/6/2015    Colon polyp     GERD (gastroesophageal reflux disease)     History of colonic polyps     HX: breast cancer     Hyperlipemia     Hypertension     ICH (intracerebral hemorrhage)     Major depressive disorder, single episode, mild 6/23/2016    Nuclear sclerosis 7/21/2014    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 7/21/2014    PAD (peripheral artery disease) 11/6/2015    Stroke 4/2011       Past Surgical History:   Procedure Laterality Date    APPENDECTOMY      BIOPSY LIVER AND ULTRASOUND N/A 4/6/2015    Performed by [facility] at [facility] OR 2ND FLR    BREAST BIOPSY Left 10/2011    BREAST LUMPECTOMY Left 2011    DCIS    COLONOSCOPY      COLONOSCOPY N/A 3/26/2015    Performed by Wicho Woodard [facility] at [facility] ENDO (4TH FLR)    CYST REMOVAL      back    ESOPHAGOGASTRODUODENOSCOPY  07/2016    duodenal angioectasia    ESOPHAGOGASTRODUODENOSCOPY (EGD) N/A 7/27/2016    Performed by Malik Sanchez MD at [facility] ENDO (4TH FLR)    FOOT FRACTURE SURGERY  10/2012     right foot, with R hallux valgus repair    FRACTURE SURGERY Right     broken toe repiar    HEMORRHOID SURGERY      LIVER BIOPSY      TUBAL LIGATION      UPPER GASTROINTESTINAL ENDOSCOPY         Review of patient's allergies indicates:   Allergen Reactions    Pravastatin Other (See Comments)     Itching, elevated cpk, muscle aches       No current facility-administered medications on file prior to encounter.      Current Outpatient Medications on File Prior to Encounter   Medication Sig    acetaminophen (TYLENOL) 650 MG TbSR Take 1,300 mg by mouth daily as needed (pain).    alirocumab (PRALUENT PEN) 75 mg/mL PnIj INJECT 75MG UNDER THE SKIN EVERY TWO WEEKS    amLODIPine (NORVASC) 5 MG tablet TAKE 1 TABLET BY MOUTH EVERY DAY    aspirin 81 mg Tab Take 1 tablet by mouth Daily.    bisacodyl (DULCOLAX) 5 mg EC tablet Take 5 mg by mouth once daily.    ferrous gluconate 324 mg (37.5 mg iron) Tab TAKE 1 TABLET BY MOUTH 2 (TWO) TIMES DAILY WITH MEALS.    gabapentin (NEURONTIN) 300 MG capsule TAKE 1 CAPSULE (300 MG TOTAL) BY MOUTH 2 (TWO) TIMES DAILY.    losartan-hydrochlorothiazide 100-25 mg (HYZAAR) 100-25 mg per tablet TAKE 1 TABLET BY MOUTH ONCE DAILY.    omeprazole (PRILOSEC) 40 MG capsule TAKE 1 CAPSULE (40 MG TOTAL) BY MOUTH ONCE DAILY.    sertraline (ZOLOFT) 50 MG tablet TAKE 1 TABLET (50 MG TOTAL) BY MOUTH ONCE DAILY.    traMADol (ULTRAM) 50 mg tablet Take 1 tablet (50 mg total) by mouth 2 (two) times daily as needed for Pain.    vitamin D 1000 units Tab Take 185 mg by mouth once daily.     Family History     Problem Relation (Age of Onset)    Breast cancer Sister (62)    Cancer Sister (63), Father    Fibroids Daughter    Hypertension Daughter, Son, Maternal Grandmother    No Known Problems Sister, Mother, Brother, Brother, Maternal Aunt, Maternal Uncle, Paternal Aunt, Paternal Uncle, Maternal Grandfather, Paternal Grandmother, Paternal Grandfather        Tobacco Use    Smoking status: Former Smoker      Packs/day: 0.50     Years: 20.00     Pack years: 10.00     Types: Cigarettes     Last attempt to quit: 2011     Years since quittin.0    Smokeless tobacco: Former User     Quit date: 4/3/2011   Substance and Sexual Activity    Alcohol use: Yes     Comment: on occasion - one glass wine every 3 months    Drug use: No    Sexual activity: Yes     Partners: Male     Birth control/protection: Post-menopausal     Review of Systems   Constitutional: Negative for activity change, chills, fatigue, fever and unexpected weight change.   HENT: Negative for congestion.    Eyes: Positive for pain. Negative for visual disturbance.   Respiratory: Positive for chest tightness. Negative for cough, choking, shortness of breath, wheezing and stridor.    Cardiovascular: Positive for chest pain. Negative for palpitations and leg swelling.   Gastrointestinal: Negative for abdominal distention, abdominal pain, blood in stool, constipation, diarrhea, nausea and vomiting.   Endocrine: Negative for polyuria.   Genitourinary: Negative for difficulty urinating, dysuria, flank pain, frequency, menstrual problem, pelvic pain, urgency, vaginal bleeding and vaginal discharge.   Musculoskeletal: Negative for arthralgias and gait problem.   Skin: Positive for pallor. Negative for rash and wound.   Neurological: Positive for speech difficulty. Negative for dizziness, tremors, seizures, facial asymmetry, weakness, numbness and headaches.   Hematological: Does not bruise/bleed easily.   Psychiatric/Behavioral: Positive for confusion. Negative for agitation, behavioral problems and decreased concentration.     Objective:     Vital Signs (Most Recent):  Temp: 98.1 °F (36.7 °C) (19)  Pulse: 88 (19)  Resp: 18 (19)  BP: 119/70 (19)  SpO2: (!) 92 % (19) Vital Signs (24h Range):  Temp:  [97.7 °F (36.5 °C)-98.1 °F (36.7 °C)] 98.1 °F (36.7 °C)  Pulse:  [81-93] 88  Resp:  [15-18] 18  SpO2:  [90  %-99 %] 92 %  BP: (110-121)/(57-70) 119/70     Weight: 73.9 kg (162 lb 14.7 oz)  Body mass index is 28.86 kg/m².    Physical Exam   Constitutional: She is oriented to person, place, and time. She appears well-developed and well-nourished. No distress.   Dysarthria with hesitancy in speech noted- residual effect from previous CVA    HENT:   Head: Normocephalic and atraumatic.   Eyes: Pupils are equal, round, and reactive to light. EOM are normal.   Neck: Normal range of motion. Neck supple. No JVD present. No thyromegaly present.   Cardiovascular: Normal rate, normal heart sounds and intact distal pulses. Exam reveals no gallop and no friction rub.   No murmur heard.  Pulmonary/Chest: Effort normal and breath sounds normal. No stridor. No respiratory distress. She has no wheezes. She has no rales. She exhibits no tenderness.   Abdominal: Soft. Bowel sounds are normal. She exhibits no distension. There is no tenderness. There is no guarding.   Musculoskeletal: Normal range of motion.   Neurological: She is alert and oriented to person, place, and time. She displays normal reflexes. No cranial nerve deficit or sensory deficit. She exhibits normal muscle tone. Coordination normal.   Residual intention tremor of R hand from previous CVA   Skin: Skin is warm and dry. Capillary refill takes less than 2 seconds. She is not diaphoretic.   Psychiatric: She has a normal mood and affect. Her behavior is normal. Judgment and thought content normal.         CRANIAL NERVES     CN III, IV, VI   Pupils are equal, round, and reactive to light.  Extraocular motions are normal.        Significant Labs: All pertinent labs within the past 24 hours have been reviewed.    Significant Imaging: I have reviewed and interpreted all pertinent imaging results/findings within the past 24 hours.    Assessment/Plan:      * Acute respiratory failure with hypoxia  Exact source unknown, history of diastolic dysfunction with elevated PAP, currently  undergoing w/u for underlying malignancy  CTPA negative for PE, ground glass opacities found - suggesting high resolution CT   History of COPD    Resolved with diuresis    Acute on chronic diastolic heart failure  Crackles heard on physical exam, postiive for orthopnea, progressive SOB, BNP 1,070,   -holding Lasix today in setting of worsening FUENTES & clinically better  -Strict Is/Os & daily weights  -Fluid & Na restriction & cardiac diet  -repeated TTE showing EF 55%    RESOLVED    TTE of 9/18 revealing   1 - Normal left ventricular systolic function (EF 55-60%).     2 - Wall motion abnormalities.     3 - Mildly enlarged ascending aorta.     4 - Impaired LV relaxation, elevated LAP (grade 2 diastolic dysfunction).     5 - Normal right ventricular systolic function .     6 - Mild mitral regurgitation.     7 - Mild to moderate tricuspid regurgitation.     8 - Pulmonary hypertension. The estimated PA systolic pressure is 41 mmHg.     Multiple lesions of metastatic malignancy  Previous smoker with 30-40 pack year history, L breast cancer s/p lumpectomy & RTX, 6 months ago she had a normal mammogram. She had a EGD back in 2016 which showed a nonbleeding angio ectasia.  A colonoscopy back in March 2015 showing a 5 mm hyperplastic polyp. Now with multiple new hepatic lesions concerning for metastatic disease. With new onset of intermittent confusion. MRI brain pending to evaluate possible mets to brain. MRI abdo/pelvis pending for further workup. Tumor marker labs pending.     Given progressive ACEVEDO with elevated est PAP, concern for ovarian origin.     CTPA 05/02/2019  Trace bilateral pleural fluid with adjacent little opacities, likely representing atelectatic changes.  Patchy ground-glass opacities throughout the lungs with possible peripheral reticulations.  These changes are nonspecific and may represent edema versus chronic lung changes.  Further evaluation with dedicated nonemergent conventional noncontrast enhanced  chest CT is recommended. Possible compression # at T7     Liver u/s 04/04/2019  Innumerable new hepatic lesions concerning for metastatic disease.  Further evaluation with contrast-enhanced CT is recommended.  1.8 cm peripancreatic lymph node.    CT Abdomen/Pelvis 4/10/2019  Innumerable indeterminate enhancing lesions identified throughout the liver.  No definitive washout on delayed phases.  Findings are indeterminate by CT criteria, metastatic disease is not excluded.  Recommend further evaluation with MRI liver mass protocol.  Enhancing retroperitoneal soft tissue lesion, likely an enlarged retroperitoneal lymph node.  Findings are concerning for metastasis in the setting of occult malignancy.  Thickened endometrium, underlying neoplasm cannot.  Recommend further evaluation with pelvic ultrasound or MRI.  Indeterminate ill-defined hypoattenuating lesion in hepatic segment VI.  Indeterminate arterial enhancing lesion in the spleen.    Recent thickened endometrial stripe  - was recently evaluated by Ob/gyn for incidental finding on CT studies did not warrant further work up due to lack of symptoms symptoms     She was evaluated by hepatology 3 years ago for mass found on abdominal CT. Per chart review it was a f/u for evaluation of elevated liver enzymes. She has undergone an extensive workup for her elevated liver enzymes. Serological workup was negative for Gino's, alpha-1 antitrypsin deficiency, hemochromatosis, autoimmune etiology, and viral hepatitis. Thyroid was normal. She had a CT scan to further evaluate her liver lesion seen on u/s. TPCT showed a focal 1.2-cm area of hypodensity identified within hepatic segment VI, which is not isodense on delayed phase imaging. Due its small size and imaging characteristics, it cannot be classified as a hemangioma and further follow-up, if clinically warranted, could be obtained with MRI in 3 to 6 months to document stability. Her AFP, CEA, CA 19-9, and  were  all normal. She underwent u/s guided liver biopsy on 4/6/15 that revealed no significant abnormality to explain her elevated transaminases.  FINAL PATHOLOGIC DIAGNOSIS  LIVER (NEEDLE BIOPSY)  -Very minimal lobular disarray/injury  -Rare macrosteatosis, less than 2%  -No iron  -No fibrosis      Gammopathy  Worsening FUENTES with elevated protein gap, proteinuria  tonya ca of 10.6  Significantly elevated immunoglobulins   T7 concerning for lytic #     Nephrology & heme onc consulted, appreciate recs  -suspicion for MM & TLS  -started on allopurinol & light hydration in setting of s/p CHF exacerbation   -spep & upep pending   -hemolysis labs   -possible bone marrow bx tomorrow       Acute anemia  - reported dark brown stool during hospital admission  -Hb 7.1 with history of severe Fe deficiency on Fe supplements came up to 10s  -protonix po for now, will continue to monitor     FUENTES (acute kidney injury)  -Cr worsened after Lasix given with rise of BUN. Suspect from aggressive diuresis vs intrinsic renal injury   -retroperitoneal u/s revealing increased resistive indices and decreased perfusion bilaterally consistent with medical renal disease.  -will continue to monitor    PVC (premature ventricular contraction)  -irregularity in HR noted in physical exam  -initial ekg showed NSR but repeat revealed PVCs consistent with physical findings  -will monitor for now  -continue telemetry      Budd-Chiari syndrome  -found on her problem list but I cannot find additional notes on this      Acute decompensated heart failure  - See principal problem for more elaboration.         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 She was seen and stated on alirocomab- and she is tolerating it well.    still elevated at 150 with HDL 43, LDL 79.6 . She is working on low-fat, low-cholesterol diet.      Essential  hypertension  -home regimen of amlodipine 5mg, losartan-HCTZ 100-25mg  Holding in the setting of diuresis      Left ventricular diastolic dysfunction with preserved systolic function  - See principal problem for more elaboration.         Tremor  Residual effect of CVA      Hyperlipemia  -continuing ASA 81    Iron deficiency anemia   Hb of 7.1 on admit, type & screened, will monitor for overt bleeding    She has history of anemia, recent H and H has come up to   10.6 / 34  (1/2018 )  She is taking iron replacement. She had a colonoscopy in 2014 for colon polyps, EGD in 2012, and video capsule in Sept 2016: Gastritis.      - Multiple angioectasias without bleeding in the                         proximal small bowel.                        - A single angioectasia without bleeding in the                         ileum.  Gi recommended following up with H/H and continuing iron.  she does have some fatigue .   C-scope showed she had 1 polyp in 2015 and it was hyperplastic and she is due for a repeat in 2025.        VTE Risk Mitigation (From admission, onward)        Ordered     heparin (porcine) injection 5,000 Units  Every 8 hours      05/02/19 1500     IP VTE HIGH RISK PATIENT  Once      04/30/19 1750     Place CRUZ hose  Until discontinued      04/30/19 1515              Velma Joshi MD  Department of Hospital Medicine   [facility]

## 2019-05-03 NOTE — ASSESSMENT & PLAN NOTE
Complicated by COPD resolving with diuresis however now complicated by TLS.  - CTPA negative for PE, ground glass opacities found , can sondier following up with high resolution CT

## 2019-05-03 NOTE — ASSESSMENT & PLAN NOTE
Worsening FUENTES with elevated protein gap, proteinuria  tonya ca of 10.6  Significantly elevated immunoglobulins   T7 concerning for lytic #     Nephrology & heme onc consulted, appreciate recs  -suspicion for MM & TLS  -started on allopurinol & light hydration in setting of s/p CHF exacerbation   -spep & upep pending   -hemolysis labs   -possible bone marrow bx tomorrow

## 2019-05-04 PROBLEM — R73.9 HYPERGLYCEMIA: Status: ACTIVE | Noted: 2019-05-04

## 2019-05-04 LAB
ABO + RH BLD: NORMAL
ALBUMIN SERPL BCP-MCNC: 2.5 G/DL (ref 3.5–5.2)
ALBUMIN SERPL BCP-MCNC: 2.6 G/DL (ref 3.5–5.2)
ANION GAP SERPL CALC-SCNC: 9 MMOL/L (ref 8–16)
BASOPHILS # BLD AUTO: 0.01 K/UL (ref 0–0.2)
BASOPHILS NFR BLD: 0.2 % (ref 0–1.9)
BLD GP AB SCN CELLS X3 SERPL QL: NORMAL
BLD PROD TYP BPU: NORMAL
BLOOD UNIT EXPIRATION DATE: NORMAL
BLOOD UNIT TYPE CODE: 5100
BLOOD UNIT TYPE: NORMAL
BUN SERPL-MCNC: 34 MG/DL (ref 8–23)
BUN SERPL-MCNC: 34 MG/DL (ref 8–23)
BUN SERPL-MCNC: 38 MG/DL (ref 8–23)
CALCIUM SERPL-MCNC: 9.1 MG/DL (ref 8.7–10.5)
CALCIUM SERPL-MCNC: 9.3 MG/DL (ref 8.7–10.5)
CALCIUM SERPL-MCNC: 9.4 MG/DL (ref 8.7–10.5)
CHLORIDE SERPL-SCNC: 101 MMOL/L (ref 95–110)
CHLORIDE SERPL-SCNC: 102 MMOL/L (ref 95–110)
CHLORIDE SERPL-SCNC: 102 MMOL/L (ref 95–110)
CO2 SERPL-SCNC: 22 MMOL/L (ref 23–29)
CO2 SERPL-SCNC: 23 MMOL/L (ref 23–29)
CO2 SERPL-SCNC: 23 MMOL/L (ref 23–29)
CODING SYSTEM: NORMAL
CREAT SERPL-MCNC: 1.9 MG/DL (ref 0.5–1.4)
CREAT SERPL-MCNC: 2 MG/DL (ref 0.5–1.4)
CREAT SERPL-MCNC: 2.4 MG/DL (ref 0.5–1.4)
DIFFERENTIAL METHOD: ABNORMAL
DISPENSE STATUS: NORMAL
EOSINOPHIL # BLD AUTO: 0 K/UL (ref 0–0.5)
EOSINOPHIL NFR BLD: 0.2 % (ref 0–8)
ERYTHROCYTE [DISTWIDTH] IN BLOOD BY AUTOMATED COUNT: 19.3 % (ref 11.5–14.5)
EST. GFR  (AFRICAN AMERICAN): 22.9 ML/MIN/1.73 M^2
EST. GFR  (AFRICAN AMERICAN): 28.5 ML/MIN/1.73 M^2
EST. GFR  (AFRICAN AMERICAN): 30.4 ML/MIN/1.73 M^2
EST. GFR  (NON AFRICAN AMERICAN): 19.9 ML/MIN/1.73 M^2
EST. GFR  (NON AFRICAN AMERICAN): 24.8 ML/MIN/1.73 M^2
EST. GFR  (NON AFRICAN AMERICAN): 26.3 ML/MIN/1.73 M^2
GLUCOSE SERPL-MCNC: 108 MG/DL (ref 70–110)
GLUCOSE SERPL-MCNC: 132 MG/DL (ref 70–110)
GLUCOSE SERPL-MCNC: 242 MG/DL (ref 70–110)
HCT VFR BLD AUTO: 21.4 % (ref 37–48.5)
HGB BLD-MCNC: 6.3 G/DL (ref 12–16)
IMM GRANULOCYTES # BLD AUTO: 0.04 K/UL (ref 0–0.04)
IMM GRANULOCYTES NFR BLD AUTO: 0.7 % (ref 0–0.5)
LYMPHOCYTES # BLD AUTO: 0.9 K/UL (ref 1–4.8)
LYMPHOCYTES NFR BLD: 16.9 % (ref 18–48)
MAGNESIUM SERPL-MCNC: 2 MG/DL (ref 1.6–2.6)
MCH RBC QN AUTO: 24.1 PG (ref 27–31)
MCHC RBC AUTO-ENTMCNC: 29.4 G/DL (ref 32–36)
MCV RBC AUTO: 82 FL (ref 82–98)
MONOCYTES # BLD AUTO: 0.1 K/UL (ref 0.3–1)
MONOCYTES NFR BLD: 1.8 % (ref 4–15)
NEUTROPHILS # BLD AUTO: 4.5 K/UL (ref 1.8–7.7)
NEUTROPHILS NFR BLD: 80.2 % (ref 38–73)
NRBC BLD-RTO: 0 /100 WBC
PHOSPHATE SERPL-MCNC: 5.2 MG/DL (ref 2.7–4.5)
PHOSPHATE SERPL-MCNC: 5.5 MG/DL (ref 2.7–4.5)
PHOSPHATE SERPL-MCNC: 5.5 MG/DL (ref 2.7–4.5)
PLATELET # BLD AUTO: 201 K/UL (ref 150–350)
PMV BLD AUTO: 10 FL (ref 9.2–12.9)
POCT GLUCOSE: 144 MG/DL (ref 70–110)
POCT GLUCOSE: 146 MG/DL (ref 70–110)
POCT GLUCOSE: 154 MG/DL (ref 70–110)
POCT GLUCOSE: 220 MG/DL (ref 70–110)
POTASSIUM SERPL-SCNC: 4.1 MMOL/L (ref 3.5–5.1)
POTASSIUM SERPL-SCNC: 4.1 MMOL/L (ref 3.5–5.1)
POTASSIUM SERPL-SCNC: 4.5 MMOL/L (ref 3.5–5.1)
PROT 24H UR-MRATE: 663 MG/SPEC (ref 0–100)
PROT UR-MCNC: 51 MG/DL (ref 0–15)
RBC # BLD AUTO: 2.61 M/UL (ref 4–5.4)
SODIUM SERPL-SCNC: 133 MMOL/L (ref 136–145)
SODIUM SERPL-SCNC: 133 MMOL/L (ref 136–145)
SODIUM SERPL-SCNC: 134 MMOL/L (ref 136–145)
TRANS ERYTHROCYTES VOL PATIENT: NORMAL ML
URATE SERPL-MCNC: 10.6 MG/DL (ref 2.4–5.7)
URINE COLLECTION DURATION: 24 HR
URINE VOLUME: 1300 ML
WBC # BLD AUTO: 5.55 K/UL (ref 3.9–12.7)

## 2019-05-04 PROCEDURE — 84166 PROTEIN E-PHORESIS/URINE/CSF: CPT | Mod: 26,HCNC,, | Performed by: PATHOLOGY

## 2019-05-04 PROCEDURE — 36430 TRANSFUSION BLD/BLD COMPNT: CPT

## 2019-05-04 PROCEDURE — 36415 COLL VENOUS BLD VENIPUNCTURE: CPT | Mod: HCNC

## 2019-05-04 PROCEDURE — 86335 IMMUNFIX E-PHORSIS/URINE/CSF: CPT | Mod: HCNC

## 2019-05-04 PROCEDURE — 63600175 PHARM REV CODE 636 W HCPCS: Mod: HCNC | Performed by: STUDENT IN AN ORGANIZED HEALTH CARE EDUCATION/TRAINING PROGRAM

## 2019-05-04 PROCEDURE — 80048 BASIC METABOLIC PNL TOTAL CA: CPT | Mod: HCNC

## 2019-05-04 PROCEDURE — 85025 COMPLETE CBC W/AUTO DIFF WBC: CPT | Mod: HCNC

## 2019-05-04 PROCEDURE — 86920 COMPATIBILITY TEST SPIN: CPT | Mod: HCNC

## 2019-05-04 PROCEDURE — 99233 PR SUBSEQUENT HOSPITAL CARE,LEVL III: ICD-10-PCS | Mod: HCNC,GC,, | Performed by: INTERNAL MEDICINE

## 2019-05-04 PROCEDURE — 20600001 HC STEP DOWN PRIVATE ROOM: Mod: HCNC

## 2019-05-04 PROCEDURE — 84550 ASSAY OF BLOOD/URIC ACID: CPT | Mod: HCNC

## 2019-05-04 PROCEDURE — P9021 RED BLOOD CELLS UNIT: HCPCS | Mod: HCNC

## 2019-05-04 PROCEDURE — 99233 SBSQ HOSP IP/OBS HIGH 50: CPT | Mod: HCNC,GC,, | Performed by: INTERNAL MEDICINE

## 2019-05-04 PROCEDURE — 80069 RENAL FUNCTION PANEL: CPT | Mod: HCNC

## 2019-05-04 PROCEDURE — 86335 IMMUNFIX E-PHORSIS/URINE/CSF: CPT | Mod: 26,HCNC,, | Performed by: PATHOLOGY

## 2019-05-04 PROCEDURE — 25000003 PHARM REV CODE 250: Mod: HCNC | Performed by: STUDENT IN AN ORGANIZED HEALTH CARE EDUCATION/TRAINING PROGRAM

## 2019-05-04 PROCEDURE — 84100 ASSAY OF PHOSPHORUS: CPT | Mod: HCNC

## 2019-05-04 PROCEDURE — 86335 PATHOLOGIST INTERPRETATION UIFE: ICD-10-PCS | Mod: 26,HCNC,, | Performed by: PATHOLOGY

## 2019-05-04 PROCEDURE — 84166 PROTEIN E-PHORESIS/URINE/CSF: CPT | Mod: HCNC

## 2019-05-04 PROCEDURE — 86850 RBC ANTIBODY SCREEN: CPT | Mod: HCNC

## 2019-05-04 PROCEDURE — 84166 PATHOLOGIST INTERPRETATION UPE: ICD-10-PCS | Mod: 26,HCNC,, | Performed by: PATHOLOGY

## 2019-05-04 PROCEDURE — 83735 ASSAY OF MAGNESIUM: CPT | Mod: HCNC

## 2019-05-04 PROCEDURE — 84156 ASSAY OF PROTEIN URINE: CPT | Mod: HCNC

## 2019-05-04 RX ORDER — SODIUM CHLORIDE 9 MG/ML
INJECTION, SOLUTION INTRAVENOUS CONTINUOUS
Status: DISCONTINUED | OUTPATIENT
Start: 2019-05-04 | End: 2019-05-05

## 2019-05-04 RX ORDER — LIDOCAINE 50 MG/G
1 PATCH TOPICAL
Status: DISCONTINUED | OUTPATIENT
Start: 2019-05-04 | End: 2019-05-08 | Stop reason: HOSPADM

## 2019-05-04 RX ORDER — AMOXICILLIN 250 MG
1 CAPSULE ORAL ONCE
Status: COMPLETED | OUTPATIENT
Start: 2019-05-04 | End: 2019-05-04

## 2019-05-04 RX ORDER — HYDROCODONE BITARTRATE AND ACETAMINOPHEN 500; 5 MG/1; MG/1
TABLET ORAL
Status: DISCONTINUED | OUTPATIENT
Start: 2019-05-04 | End: 2019-05-06

## 2019-05-04 RX ORDER — POLYETHYLENE GLYCOL 3350 17 G/17G
17 POWDER, FOR SOLUTION ORAL DAILY
Status: DISCONTINUED | OUTPATIENT
Start: 2019-05-04 | End: 2019-05-08 | Stop reason: HOSPADM

## 2019-05-04 RX ORDER — FUROSEMIDE 10 MG/ML
40 INJECTION INTRAMUSCULAR; INTRAVENOUS DAILY
Status: DISCONTINUED | OUTPATIENT
Start: 2019-05-04 | End: 2019-05-07

## 2019-05-04 RX ORDER — GLYCERIN 1 G/1
1 SUPPOSITORY RECTAL ONCE
Status: COMPLETED | OUTPATIENT
Start: 2019-05-04 | End: 2019-05-04

## 2019-05-04 RX ORDER — INSULIN ASPART 100 [IU]/ML
0-5 INJECTION, SOLUTION INTRAVENOUS; SUBCUTANEOUS EVERY 6 HOURS PRN
Status: DISCONTINUED | OUTPATIENT
Start: 2019-05-04 | End: 2019-05-08 | Stop reason: HOSPADM

## 2019-05-04 RX ADMIN — SEVELAMER CARBONATE 800 MG: 800 TABLET, FILM COATED ORAL at 08:05

## 2019-05-04 RX ADMIN — STANDARDIZED SENNA CONCENTRATE AND DOCUSATE SODIUM 1 TABLET: 8.6; 5 TABLET, FILM COATED ORAL at 02:05

## 2019-05-04 RX ADMIN — SERTRALINE HYDROCHLORIDE 50 MG: 50 TABLET ORAL at 08:05

## 2019-05-04 RX ADMIN — PANTOPRAZOLE SODIUM 40 MG: 40 TABLET, DELAYED RELEASE ORAL at 08:05

## 2019-05-04 RX ADMIN — ALLOPURINOL 200 MG: 100 TABLET ORAL at 08:05

## 2019-05-04 RX ADMIN — DEXAMETHASONE SODIUM PHOSPHATE 40 MG: 4 INJECTION, SOLUTION INTRAMUSCULAR; INTRAVENOUS at 09:05

## 2019-05-04 RX ADMIN — SEVELAMER CARBONATE 800 MG: 800 TABLET, FILM COATED ORAL at 12:05

## 2019-05-04 RX ADMIN — GABAPENTIN 300 MG: 300 CAPSULE ORAL at 08:05

## 2019-05-04 RX ADMIN — HEPARIN SODIUM 5000 UNITS: 5000 INJECTION, SOLUTION INTRAVENOUS; SUBCUTANEOUS at 05:05

## 2019-05-04 RX ADMIN — SODIUM CHLORIDE: 0.9 INJECTION, SOLUTION INTRAVENOUS at 04:05

## 2019-05-04 RX ADMIN — LIDOCAINE 1 PATCH: 50 PATCH TOPICAL at 03:05

## 2019-05-04 RX ADMIN — AMLODIPINE BESYLATE 5 MG: 5 TABLET ORAL at 08:05

## 2019-05-04 RX ADMIN — GLYCERIN 1 SUPPOSITORY: 2 SUPPOSITORY RECTAL at 03:05

## 2019-05-04 RX ADMIN — FUROSEMIDE 40 MG: 10 INJECTION, SOLUTION INTRAMUSCULAR; INTRAVENOUS at 02:05

## 2019-05-04 RX ADMIN — POLYETHYLENE GLYCOL 3350 17 G: 17 POWDER, FOR SOLUTION ORAL at 02:05

## 2019-05-04 RX ADMIN — GABAPENTIN 300 MG: 300 CAPSULE ORAL at 07:05

## 2019-05-04 RX ADMIN — SEVELAMER CARBONATE 800 MG: 800 TABLET, FILM COATED ORAL at 04:05

## 2019-05-04 RX ADMIN — ASPIRIN 81 MG CHEWABLE TABLET 81 MG: 81 TABLET CHEWABLE at 08:05

## 2019-05-04 RX ADMIN — HEPARIN SODIUM 5000 UNITS: 5000 INJECTION, SOLUTION INTRAVENOUS; SUBCUTANEOUS at 01:05

## 2019-05-04 NOTE — PROGRESS NOTES
[facility] Hospital Medicine  Progress Note    Patient Name: Shelby Thompson  MRN: [id number]  Patient Class: IP- Inpatient   Admission Date: 4/30/2019  Length of Stay: 4 days  Attending Physician: Indira Rodrigez MD  Primary Care Provider: Emanuel Arevalo MD    [facility]: [facility] HOSP MED 5 Velma Joshi MD    Subjective:     Principal Problem:Acute respiratory failure with hypoxia    HPI:  Ms. 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    She was seen by her PCP Dr. Arevalo this morning, who recorded an SaO2 of 87% and sent her to the ED for evaluation of PE. She is also reporting left flank and chest pain. She states that the pain feels like it's under her breast and wraps around her side to her back. She gets relief with tramadol and by sitting on the edge of her recliner chair at home. The pain is constant, but does not radiate. Palpation makes the pain worse. She denies any nausea, vomiting, diaphoresis or worsening SOB with increases in her pain. Patient reports that she used to be able to get around without an issue, but now gets so short of breath going from one room to another that she has to sit down.     Of note, she has a hx of CVA (residual deficits of weakness and tremor on right side). She uses a rollator to get around, and there is no acute change with this. Patient's  states that the PCP was also concerned about her recent CT scan and wanted her to be admitted for continued work up of her abnormal CT scan. Patient denies any issues with bleeding, bruising, or LE edema.    6 months ago she had a normal mammogram. Will she  had a EGD back in 2016 which showed a nonbleeding angio ectasia.  A colonoscopy back in March 2015 showing a 5 mm hyperplastic polyp     Liver u/s 04/04/2019  Innumerable new hepatic lesions concerning for metastatic disease.  Further evaluation with contrast-enhanced CT is recommended.  1.8 cm peripancreatic lymph node.    CT Abdomen/Pelvis 4/10/2019  Innumerable indeterminate enhancing lesions identified throughout the liver.  No definitive washout on delayed phases.  Findings are indeterminate by CT criteria, metastatic disease is not excluded.  Recommend further evaluation with MRI liver mass protocol.  Enhancing retroperitoneal soft tissue lesion, likely an enlarged retroperitoneal lymph node.  Findings are concerning for metastasis in the setting of occult malignancy.  Thickened endometrium, underlying neoplasm cannot.  Recommend further evaluation with pelvic ultrasound or MRI.  Indeterminate ill-defined hypoattenuating lesion in hepatic segment VI.  Indeterminate arterial enhancing lesion in the spleen.      She was evaluated by hepatology 3 years ago for mass found on abdominal CT. Per chart review it was a f/u for evaluation of elevated liver enzymes. She has undergone an extensive workup for her elevated liver enzymes. Serological workup was negative for Gino's, alpha-1 antitrypsin deficiency, hemochromatosis, autoimmune etiology, and viral hepatitis. Thyroid was normal. She had a CT scan to further evaluate her liver lesion seen on u/s. TPCT showed a focal 1.2-cm area of hypodensity identified within hepatic segment VI, which is not isodense on delayed phase imaging. Due its small size and imaging characteristics, it cannot be classified as a hemangioma and further follow-up, if clinically warranted, could be obtained with MRI in 3 to 6 months to document stability. Her AFP, CEA, CA 19-9, and  were all normal. She underwent u/s guided liver biopsy on 4/6/15 that revealed no significant  abnormality to explain her elevated transaminases.  FINAL PATHOLOGIC DIAGNOSIS  LIVER (NEEDLE BIOPSY)  -Very minimal lobular disarray/injury  -Rare macrosteatosis, less than 2%  -No iron  -No fibrosis  Supplemental Diagnosis  This addendum is issued for the results of ancillary studies.  PASD is negative. [place] red stain is negative for amyloid.  Keratin 7 is positive in bile ducts with no alterations (normal pattern staining).  All immunostain and special stain controls reacted appropriately.    Hospital Course:  She was transitioned to room air with diuresis. Her course was complicated by worsening FUENTES. Heme/Onc was consulted for further workup of underlying malignancy as labs were suggestive of MM. Nephrology was consulted for worsening FUENTES as it did not improved but worsened with diuresis. 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 During her course  reports intermittent confusion but it is her baseline. Her hb was in the 7s but she required 1u transfusion with diuresis when it fell to 6.3 on 05/04/2019. She continues to complain of L sided lower rib pain.     Interval History: S/p bone marrow biopsy.  Otherwise she's had subjective improvement in SOB and is on RA.  Still reports left sided lower rib pain.     Review of Systems   Constitutional: Negative for chills and fever.   Respiratory: Negative for shortness of breath and wheezing.    Cardiovascular: Negative for chest pain and palpitations.   Gastrointestinal: Positive for abdominal pain.   Genitourinary: Negative for dysuria.     Objective:     Vital Signs (Most Recent):  Temp: 98.1 °F (36.7 °C) (05/04/19 1133)  Pulse: 88 (05/04/19 1133)  Resp: 18 (05/04/19 1110)  BP: 127/60 (05/04/19 1133)  SpO2: (!) 91 % (05/04/19 1133) Vital Signs (24h Range):  Temp:  [97.5 °F (36.4 °C)-98.7 °F (37.1 °C)] 98.1 °F (36.7 °C)  Pulse:  [79-94] 88  Resp:  [16-18] 18  SpO2:  [91  %-98 %] 91 %  BP: (108-144)/(58-69) 127/60     Weight: 78 kg (171 lb 13.6 oz)  Body mass index is 30.44 kg/m².    Intake/Output Summary (Last 24 hours) at 5/4/2019 1458  Last data filed at 5/4/2019 1200  Gross per 24 hour   Intake 900 ml   Output 500 ml   Net 400 ml      Physical Exam   Constitutional: She is oriented to person, place, and time. She appears well-developed and well-nourished. No distress.   Dysarthria with hesitancy in speech noted- residual effect from previous CVA    HENT:   Head: Normocephalic and atraumatic.   Eyes: Pupils are equal, round, and reactive to light. EOM are normal.   Neck: Normal range of motion. Neck supple.   Cardiovascular: Normal rate, normal heart sounds and intact distal pulses.   No murmur heard.  Pulmonary/Chest: Effort normal. No respiratory distress. She has rales.   Abdominal: Soft. Bowel sounds are normal. She exhibits no distension. There is no tenderness. There is no guarding.   Musculoskeletal: Normal range of motion. She exhibits no edema.   Neurological: She is alert and oriented to person, place, and time.   Residual intention tremor of R hand from previous CVA   Skin: Skin is warm and dry. Capillary refill takes less than 2 seconds. She is not diaphoretic.   Psychiatric: She has a normal mood and affect. Her behavior is normal. Judgment and thought content normal.       Significant Labs: All pertinent labs within the past 24 hours have been reviewed.    Significant Imaging: I have reviewed all pertinent imaging results/findings within the past 24 hours.    Assessment/Plan:      * Acute respiratory failure with hypoxia  RESOLVED    Complicated by COPD resolving with diuresis however now complicated by TLS.  - CTPA negative for PE, ground glass opacities found , can panchitoer following up with high resolution CT     -transfusing 1prbc today with Lasix         Acute on chronic diastolic heart failure  RESOLVED  Unclear clinically, complicated by TLS and metastatic  malignancy. Will monitor with gentle hydration and start BiPAP if acute worsening resp distress.      Multiple lesions of metastatic malignancy  Previous history of breast cancer and workup for liver lesions years ago now presenting with protein gap and TLS suggestive of leukemia or Multiple myeloma. Pt with suspected IgG kappa multiple myeloma with M-spike of 6.75. She also met some  traditional criteria for disease as well with anemia (Hgb of 7.1), corrceted calcium of 10.6, renal dysfunction with Cr of 2.7, and new T7 verterbral fracture. Beta 2 microglobulin was elevated at 17.5 and albumin was 2.5     Appreciate assistance from oncology, bone marrow biopsy results pending   - Will give Dex 40 x 4 days per oncology recs           Gammopathy  Worsening FUENTES with elevated protein gap, proteinuria  tonya ca of 10.6  Significantly elevated immunoglobulins   T7 concerning for lytic #     Nephrology & heme onc consulted, appreciate recs  -suspicion for MM & TLS  -started on allopurinol & light hydration in setting of s/p CHF exacerbation   -spep & upep pending   -hemolysis labs   -s/p bone bx pending results      Acute anemia  In setting of suspected malignancy likely of a dysplastic nature, Multiple myeloma or leukemia etc.    - Transfuse < 7 hgb     FUENTES (acute kidney injury)  Nephrology on board appreciate assistance.  Labs suggestive of TLS with unknown malignancy currently be worked up by Oncology with bone marrow biopsy pending.  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 She was seen and stated on alirocomab- and she is tolerating it well.    still elevated at 150 with HDL 43, LDL 79.6 . She is working on low-fat, low-cholesterol diet.      Hyperglycemia  -last A1c of 7.4, not currently on regiment  -in setting of IV steroid initiation, LDSSI   Essential hypertension  -home regimen of amlodipine 5mg, losartan-HCTZ 100-25mg  Holding in the setting of diuresis      Left ventricular diastolic dysfunction with preserved systolic function  - See principal problem for more elaboration.          Tremor  Residual effect of CVA      Hyperlipemia  -continuing ASA 81    Iron deficiency anemia   Hb of 7.1 on admit, type & screened, will monitor for overt bleeding    She has history of anemia, recent H and H has come up to   10.6 / 34  (1/2018 )  She is taking iron replacement. She had a colonoscopy in 2014 for colon polyps, EGD in 2012, and video capsule in Sept 2016: Gastritis.      - Multiple angioectasias without bleeding in the                         proximal small bowel.                        - A single angioectasia without bleeding in the                         ileum.  Gi recommended following up with H/H and continuing iron.  she does have some fatigue .   C-scope showed she had 1 polyp in 2015 and it was hyperplastic and she is due for a repeat in 2025.      VTE Risk Mitigation (From admission, onward)        Ordered     heparin (porcine) injection 5,000 Units  Every 8 hours      05/02/19 1500     IP VTE HIGH RISK PATIENT  Once      04/30/19 1750     Place CRUZ hose  Until discontinued      04/30/19 6732              Velma Joshi MD  Department of Hospital Medicine   [facility]

## 2019-05-04 NOTE — ASSESSMENT & PLAN NOTE
Nephrology on board appreciate assistance.  Labs suggestive of TLS with unknown malignancy currently be worked up by Oncology with bone marrow biopsy pending.  - gentle hydration with 75 CC and lasix 40 BID   - Allopurinol, rasburicase   - BMP BID

## 2019-05-04 NOTE — PLAN OF CARE
Problem: Adult Inpatient Plan of Care  Goal: Plan of Care Review  Outcome: Revised  Pt free of falls/trauma/injuries.  Denies c/o SOB, CP, or discomfort.  Generalized skin remains CDI; trace edema noted.  Pt being diuresed with Lasix once today; diuresing well.  Pt received 1 unit of RBC today RT low H and H. Blood glucose controlled with supplemental insulin. Pt tolerating plan of care.

## 2019-05-04 NOTE — ASSESSMENT & PLAN NOTE
Previous history of breast cancer and workup for liver lesions years ago now presenting with protein gap and TLS suggestive of leukemia or Multiple myeloma. Pt with suspected IgG kappa multiple myeloma with M-spike of 6.75. She also met some  traditional criteria for disease as well with anemia (Hgb of 7.1), corrceted calcium of 10.6, renal dysfunction with Cr of 2.7, and new T7 verterbral fracture. Beta 2 microglobulin was elevated at 17.5 and albumin was 2.5     Appreciate assistance from oncology, bone marrow biopsy results pending   - Will give Dex 40 x 4 days per oncology recs

## 2019-05-04 NOTE — ASSESSMENT & PLAN NOTE
Worsening FUENTES with elevated protein gap, proteinuria  tonya ca of 10.6  Significantly elevated immunoglobulins   T7 concerning for lytic #     Nephrology & heme onc consulted, appreciate recs  -suspicion for MM & TLS  -started on allopurinol & light hydration in setting of s/p CHF exacerbation   -spep & upep pending   -hemolysis labs   -s/p bone bx pending results

## 2019-05-04 NOTE — ASSESSMENT & PLAN NOTE
RESOLVED    Complicated by COPD resolving with diuresis however now complicated by TLS.  - CTPA negative for PE, ground glass opacities found , can sondier following up with high resolution CT     -transfusing 1prbc today with Lasix

## 2019-05-04 NOTE — SUBJECTIVE & OBJECTIVE
Interval History: S/p bone marrow biopsy.  Otherwise she's had subjective improvement in SOB and is on RA.  Still reports left sided lower rib pain.     Review of Systems   Constitutional: Negative for chills and fever.   Respiratory: Negative for shortness of breath and wheezing.    Cardiovascular: Negative for chest pain and palpitations.   Gastrointestinal: Positive for abdominal pain.   Genitourinary: Negative for dysuria.     Objective:     Vital Signs (Most Recent):  Temp: 98.1 °F (36.7 °C) (05/04/19 1133)  Pulse: 88 (05/04/19 1133)  Resp: 18 (05/04/19 1110)  BP: 127/60 (05/04/19 1133)  SpO2: (!) 91 % (05/04/19 1133) Vital Signs (24h Range):  Temp:  [97.5 °F (36.4 °C)-98.7 °F (37.1 °C)] 98.1 °F (36.7 °C)  Pulse:  [79-94] 88  Resp:  [16-18] 18  SpO2:  [91 %-98 %] 91 %  BP: (108-144)/(58-69) 127/60     Weight: 78 kg (171 lb 13.6 oz)  Body mass index is 30.44 kg/m².    Intake/Output Summary (Last 24 hours) at 5/4/2019 1458  Last data filed at 5/4/2019 1200  Gross per 24 hour   Intake 900 ml   Output 500 ml   Net 400 ml      Physical Exam   Constitutional: She is oriented to person, place, and time. She appears well-developed and well-nourished. No distress.   Dysarthria with hesitancy in speech noted- residual effect from previous CVA    HENT:   Head: Normocephalic and atraumatic.   Eyes: Pupils are equal, round, and reactive to light. EOM are normal.   Neck: Normal range of motion. Neck supple.   Cardiovascular: Normal rate, normal heart sounds and intact distal pulses.   No murmur heard.  Pulmonary/Chest: Effort normal. No respiratory distress. She has rales.   Abdominal: Soft. Bowel sounds are normal. She exhibits no distension. There is no tenderness. There is no guarding.   Musculoskeletal: Normal range of motion. She exhibits no edema.   Neurological: She is alert and oriented to person, place, and time.   Residual intention tremor of R hand from previous CVA   Skin: Skin is warm and dry. Capillary refill  takes less than 2 seconds. She is not diaphoretic.   Psychiatric: She has a normal mood and affect. Her behavior is normal. Judgment and thought content normal.       Significant Labs: All pertinent labs within the past 24 hours have been reviewed.    Significant Imaging: I have reviewed all pertinent imaging results/findings within the past 24 hours.

## 2019-05-04 NOTE — PROGRESS NOTES
Results for MARISOL TOUSSAINT (MRN [id number]) as of 5/4/2019 06:37   Ref. Range 5/4/2019 05:46   Hemoglobin Latest Ref Range: 12.0 - 16.0 g/dL 6.3 (L)   Hematocrit Latest Ref Range: 37.0 - 48.5 % 21.4 (L)   Notified MD on-call for IM5.  No new orders. Will continue to monitor.

## 2019-05-04 NOTE — ASSESSMENT & PLAN NOTE
RESOLVED  Unclear clinically, complicated by TLS and metastatic malignancy. Will monitor with gentle hydration and start BiPAP if acute worsening resp distress.

## 2019-05-05 LAB
ALBUMIN SERPL BCP-MCNC: 2.6 G/DL (ref 3.5–5.2)
ALP SERPL-CCNC: 57 U/L (ref 55–135)
ALT SERPL W/O P-5'-P-CCNC: 37 U/L (ref 10–44)
ANION GAP SERPL CALC-SCNC: 7 MMOL/L (ref 8–16)
ANION GAP SERPL CALC-SCNC: 8 MMOL/L (ref 8–16)
AST SERPL-CCNC: 38 U/L (ref 10–40)
BASOPHILS # BLD AUTO: 0 K/UL (ref 0–0.2)
BASOPHILS NFR BLD: 0 % (ref 0–1.9)
BILIRUB SERPL-MCNC: 0.2 MG/DL (ref 0.1–1)
BUN SERPL-MCNC: 43 MG/DL (ref 8–23)
BUN SERPL-MCNC: 44 MG/DL (ref 8–23)
CALCIUM SERPL-MCNC: 9.1 MG/DL (ref 8.7–10.5)
CALCIUM SERPL-MCNC: 9.3 MG/DL (ref 8.7–10.5)
CHLORIDE SERPL-SCNC: 103 MMOL/L (ref 95–110)
CHLORIDE SERPL-SCNC: 104 MMOL/L (ref 95–110)
CO2 SERPL-SCNC: 24 MMOL/L (ref 23–29)
CO2 SERPL-SCNC: 26 MMOL/L (ref 23–29)
CREAT SERPL-MCNC: 1.7 MG/DL (ref 0.5–1.4)
CREAT SERPL-MCNC: 1.8 MG/DL (ref 0.5–1.4)
DIFFERENTIAL METHOD: ABNORMAL
EOSINOPHIL # BLD AUTO: 0 K/UL (ref 0–0.5)
EOSINOPHIL NFR BLD: 0 % (ref 0–8)
ERYTHROCYTE [DISTWIDTH] IN BLOOD BY AUTOMATED COUNT: 18.7 % (ref 11.5–14.5)
EST. GFR  (AFRICAN AMERICAN): 32.4 ML/MIN/1.73 M^2
EST. GFR  (AFRICAN AMERICAN): 34.7 ML/MIN/1.73 M^2
EST. GFR  (NON AFRICAN AMERICAN): 28.1 ML/MIN/1.73 M^2
EST. GFR  (NON AFRICAN AMERICAN): 30.1 ML/MIN/1.73 M^2
GLUCOSE SERPL-MCNC: 105 MG/DL (ref 70–110)
GLUCOSE SERPL-MCNC: 135 MG/DL (ref 70–110)
HCT VFR BLD AUTO: 22.9 % (ref 37–48.5)
HGB BLD-MCNC: 7.2 G/DL (ref 12–16)
IMM GRANULOCYTES # BLD AUTO: 0.01 K/UL (ref 0–0.04)
IMM GRANULOCYTES NFR BLD AUTO: 0.2 % (ref 0–0.5)
LYMPHOCYTES # BLD AUTO: 0.6 K/UL (ref 1–4.8)
LYMPHOCYTES NFR BLD: 9.3 % (ref 18–48)
MAGNESIUM SERPL-MCNC: 1.9 MG/DL (ref 1.6–2.6)
MCH RBC QN AUTO: 24.9 PG (ref 27–31)
MCHC RBC AUTO-ENTMCNC: 31.4 G/DL (ref 32–36)
MCV RBC AUTO: 79 FL (ref 82–98)
MONOCYTES # BLD AUTO: 0.4 K/UL (ref 0.3–1)
MONOCYTES NFR BLD: 7.4 % (ref 4–15)
NEUTROPHILS # BLD AUTO: 4.9 K/UL (ref 1.8–7.7)
NEUTROPHILS NFR BLD: 83.1 % (ref 38–73)
NRBC BLD-RTO: 0 /100 WBC
PHOSPHATE SERPL-MCNC: 3.9 MG/DL (ref 2.7–4.5)
PLATELET # BLD AUTO: 168 K/UL (ref 150–350)
PMV BLD AUTO: 10.4 FL (ref 9.2–12.9)
POCT GLUCOSE: 101 MG/DL (ref 70–110)
POCT GLUCOSE: 133 MG/DL (ref 70–110)
POCT GLUCOSE: 162 MG/DL (ref 70–110)
POCT GLUCOSE: 170 MG/DL (ref 70–110)
POTASSIUM SERPL-SCNC: 3.9 MMOL/L (ref 3.5–5.1)
POTASSIUM SERPL-SCNC: 4.1 MMOL/L (ref 3.5–5.1)
PROT SERPL-MCNC: 12.6 G/DL (ref 6–8.4)
RBC # BLD AUTO: 2.89 M/UL (ref 4–5.4)
SODIUM SERPL-SCNC: 136 MMOL/L (ref 136–145)
SODIUM SERPL-SCNC: 136 MMOL/L (ref 136–145)
URATE SERPL-MCNC: 10.4 MG/DL (ref 2.4–5.7)
WBC # BLD AUTO: 5.94 K/UL (ref 3.9–12.7)

## 2019-05-05 PROCEDURE — 97161 PT EVAL LOW COMPLEX 20 MIN: CPT | Mod: HCNC

## 2019-05-05 PROCEDURE — 84100 ASSAY OF PHOSPHORUS: CPT | Mod: HCNC

## 2019-05-05 PROCEDURE — 25000003 PHARM REV CODE 250: Mod: HCNC | Performed by: STUDENT IN AN ORGANIZED HEALTH CARE EDUCATION/TRAINING PROGRAM

## 2019-05-05 PROCEDURE — 99233 SBSQ HOSP IP/OBS HIGH 50: CPT | Mod: HCNC,GC,, | Performed by: INTERNAL MEDICINE

## 2019-05-05 PROCEDURE — 99233 PR SUBSEQUENT HOSPITAL CARE,LEVL III: ICD-10-PCS | Mod: HCNC,GC,, | Performed by: INTERNAL MEDICINE

## 2019-05-05 PROCEDURE — 83735 ASSAY OF MAGNESIUM: CPT | Mod: HCNC

## 2019-05-05 PROCEDURE — 36415 COLL VENOUS BLD VENIPUNCTURE: CPT | Mod: HCNC

## 2019-05-05 PROCEDURE — 84550 ASSAY OF BLOOD/URIC ACID: CPT | Mod: HCNC

## 2019-05-05 PROCEDURE — 97165 OT EVAL LOW COMPLEX 30 MIN: CPT | Mod: HCNC

## 2019-05-05 PROCEDURE — 20600001 HC STEP DOWN PRIVATE ROOM: Mod: HCNC

## 2019-05-05 PROCEDURE — 63600175 PHARM REV CODE 636 W HCPCS: Mod: HCNC | Performed by: STUDENT IN AN ORGANIZED HEALTH CARE EDUCATION/TRAINING PROGRAM

## 2019-05-05 PROCEDURE — 80048 BASIC METABOLIC PNL TOTAL CA: CPT | Mod: HCNC

## 2019-05-05 PROCEDURE — 80053 COMPREHEN METABOLIC PANEL: CPT | Mod: HCNC

## 2019-05-05 PROCEDURE — 85025 COMPLETE CBC W/AUTO DIFF WBC: CPT | Mod: HCNC

## 2019-05-05 RX ORDER — SODIUM CHLORIDE 9 MG/ML
INJECTION, SOLUTION INTRAVENOUS CONTINUOUS
Status: ACTIVE | OUTPATIENT
Start: 2019-05-05 | End: 2019-05-05

## 2019-05-05 RX ORDER — ACETAMINOPHEN 325 MG/1
650 TABLET ORAL ONCE
Status: COMPLETED | OUTPATIENT
Start: 2019-05-05 | End: 2019-05-05

## 2019-05-05 RX ADMIN — HEPARIN SODIUM 5000 UNITS: 5000 INJECTION, SOLUTION INTRAVENOUS; SUBCUTANEOUS at 02:05

## 2019-05-05 RX ADMIN — SEVELAMER CARBONATE 800 MG: 800 TABLET, FILM COATED ORAL at 08:05

## 2019-05-05 RX ADMIN — LIDOCAINE 1 PATCH: 50 PATCH TOPICAL at 02:05

## 2019-05-05 RX ADMIN — HEPARIN SODIUM 5000 UNITS: 5000 INJECTION, SOLUTION INTRAVENOUS; SUBCUTANEOUS at 06:05

## 2019-05-05 RX ADMIN — POLYETHYLENE GLYCOL 3350 17 G: 17 POWDER, FOR SOLUTION ORAL at 08:05

## 2019-05-05 RX ADMIN — SEVELAMER CARBONATE 800 MG: 800 TABLET, FILM COATED ORAL at 11:05

## 2019-05-05 RX ADMIN — FUROSEMIDE 40 MG: 10 INJECTION, SOLUTION INTRAMUSCULAR; INTRAVENOUS at 09:05

## 2019-05-05 RX ADMIN — ALLOPURINOL 200 MG: 100 TABLET ORAL at 08:05

## 2019-05-05 RX ADMIN — HEPARIN SODIUM 5000 UNITS: 5000 INJECTION, SOLUTION INTRAVENOUS; SUBCUTANEOUS at 08:05

## 2019-05-05 RX ADMIN — ASPIRIN 81 MG CHEWABLE TABLET 81 MG: 81 TABLET CHEWABLE at 08:05

## 2019-05-05 RX ADMIN — DEXAMETHASONE SODIUM PHOSPHATE 40 MG: 4 INJECTION, SOLUTION INTRAMUSCULAR; INTRAVENOUS at 09:05

## 2019-05-05 RX ADMIN — SEVELAMER CARBONATE 800 MG: 800 TABLET, FILM COATED ORAL at 05:05

## 2019-05-05 RX ADMIN — GABAPENTIN 300 MG: 300 CAPSULE ORAL at 08:05

## 2019-05-05 RX ADMIN — PANTOPRAZOLE SODIUM 40 MG: 40 TABLET, DELAYED RELEASE ORAL at 08:05

## 2019-05-05 RX ADMIN — ACETAMINOPHEN 650 MG: 325 TABLET ORAL at 10:05

## 2019-05-05 RX ADMIN — SERTRALINE HYDROCHLORIDE 50 MG: 50 TABLET ORAL at 08:05

## 2019-05-05 NOTE — SUBJECTIVE & OBJECTIVE
Interval History:   97% on RA this AM. Tolerating fluids. Lungs clear. Still reports left sided lower rib pain and point of thoracic tenderness correlating with lesion found on T7 spine. Lidocaine patch mildly improved pain. Had 2 Bms yesterday & reporting lots of flatus feeling uncomfortable today.     Review of Systems   Constitutional: Positive for chills. Negative for fever.   Respiratory: Negative for shortness of breath and wheezing.    Cardiovascular: Negative for chest pain and palpitations.   Gastrointestinal: Positive for abdominal distention and abdominal pain. Negative for diarrhea, nausea and vomiting.   Genitourinary: Negative for dysuria.     Objective:     Vital Signs (Most Recent):  Temp: 98.1 °F (36.7 °C) (05/05/19 0729)  Pulse: 74 (05/05/19 0733)  Resp: 16 (05/05/19 0729)  BP: (!) 131/90 (05/05/19 0729)  SpO2: 97 % (05/05/19 0729) Vital Signs (24h Range):  Temp:  [97.5 °F (36.4 °C)-98.2 °F (36.8 °C)] 98.1 °F (36.7 °C)  Pulse:  [74-94] 74  Resp:  [16-20] 16  SpO2:  [90 %-97 %] 97 %  BP: (126-139)/(59-90) 131/90     Weight: 77.1 kg (169 lb 15.6 oz)  Body mass index is 30.11 kg/m².    Intake/Output Summary (Last 24 hours) at 5/5/2019 0945  Last data filed at 5/5/2019 0500  Gross per 24 hour   Intake 1020 ml   Output 650 ml   Net 370 ml      Physical Exam   Constitutional: She is oriented to person, place, and time. She appears well-developed and well-nourished. No distress.   Dysarthria with hesitancy in speech noted- residual effect from previous CVA    HENT:   Head: Normocephalic and atraumatic.   Eyes: Pupils are equal, round, and reactive to light. EOM are normal.   Neck: Normal range of motion. Neck supple.   Cardiovascular: Normal rate, normal heart sounds and intact distal pulses.   No murmur heard.  Pulmonary/Chest: Effort normal. No respiratory distress. She has rales.   Abdominal: Soft. Bowel sounds are normal. She exhibits no distension. There is no tenderness. There is no guarding.    Musculoskeletal: Normal range of motion. She exhibits no edema.   Neurological: She is alert and oriented to person, place, and time.   Residual intention tremor of R hand from previous CVA   Skin: Skin is warm and dry. Capillary refill takes less than 2 seconds. She is not diaphoretic.   Psychiatric: She has a normal mood and affect. Her behavior is normal. Judgment and thought content normal.       Significant Labs: All pertinent labs within the past 24 hours have been reviewed.    Significant Imaging: I have reviewed all pertinent imaging results/findings within the past 24 hours.

## 2019-05-05 NOTE — PLAN OF CARE
Problem: Occupational Therapy Goal  Goal: Occupational Therapy Goal  Outcome: Outcome(s) achieved Date Met: 05/05/19  Initial completed   No goals established; pt requires no OT follow up.     Kristi Anton, OTR/L  [phone]  5/5/2019

## 2019-05-05 NOTE — ASSESSMENT & PLAN NOTE
Previous history of breast cancer and workup for liver lesions years ago now presenting with protein gap and TLS suggestive of leukemia or Multiple myeloma. Pt with suspected IgG kappa multiple myeloma with M-spike of 6.75. She also met some  traditional criteria for disease as well with anemia (Hgb of 7.1), corrceted calcium of 10.6, renal dysfunction with Cr of 2.7, and new T7 verterbral fracture. Beta 2 microglobulin was elevated at 17.5 and albumin was 2.5     Appreciate assistance from oncology, bone marrow biopsy results pending   - Will give Dex 40 x 4 days per oncology recs     -will discuss with heme/onc possibility of lesion biopsies from spleen or liver to evaluate for malignancy source

## 2019-05-05 NOTE — ASSESSMENT & PLAN NOTE
RESOLVED    Complicated by COPD resolving with diuresis however now complicated by TLS.  - CTPA negative for PE, ground glass opacities found , can sondier following up with high resolution CT     - s/p transfusion 1prbc yesterday with Lasix   -Lasix 40mg QD for now

## 2019-05-05 NOTE — PROGRESS NOTES
[facility] Hospital Medicine  Progress Note    Patient Name: Shelby Thompson  MRN: [id number]  Patient Class: IP- Inpatient   Admission Date: 4/30/2019  Length of Stay: 5 days  Attending Physician: Indiar Rodrigez MD  Primary Care Provider: Emanuel Arevalo MD    [facility] Medicine Team: [facility] 5 Velma Joshi MD    Subjective:     Principal Problem:Acute respiratory failure with hypoxia    HPI:  Ms. 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    She was seen by her PCP Dr. Arevalo this morning, who recorded an SaO2 of 87% and sent her to the ED for evaluation of PE. She is also reporting left flank and chest pain. She states that the pain feels like it's under her breast and wraps around her side to her back. She gets relief with tramadol and by sitting on the edge of her recliner chair at home. The pain is constant, but does not radiate. Palpation makes the pain worse. She denies any nausea, vomiting, diaphoresis or worsening SOB with increases in her pain. Patient reports that she used to be able to get around without an issue, but now gets so short of breath going from one room to another that she has to sit down.     Of note, she has a hx of CVA (residual deficits of weakness and tremor on right side). She uses a rollator to get around, and there is no acute change with this. Patient's  states that the PCP was also concerned about her recent CT scan and wanted her to be admitted for continued work up of her abnormal CT scan. Patient denies any issues with bleeding, bruising, or LE edema.    6 months ago she had a normal mammogram. Will she  had a EGD back in 2016 which showed a nonbleeding angio ectasia.  A colonoscopy back in March 2015 showing a 5 mm hyperplastic polyp     Liver u/s 04/04/2019  Innumerable new hepatic lesions concerning for metastatic disease.  Further evaluation with contrast-enhanced CT is recommended.  1.8 cm peripancreatic lymph node.    CT Abdomen/Pelvis 4/10/2019  Innumerable indeterminate enhancing lesions identified throughout the liver.  No definitive washout on delayed phases.  Findings are indeterminate by CT criteria, metastatic disease is not excluded.  Recommend further evaluation with MRI liver mass protocol.  Enhancing retroperitoneal soft tissue lesion, likely an enlarged retroperitoneal lymph node.  Findings are concerning for metastasis in the setting of occult malignancy.  Thickened endometrium, underlying neoplasm cannot.  Recommend further evaluation with pelvic ultrasound or MRI.  Indeterminate ill-defined hypoattenuating lesion in hepatic segment VI.  Indeterminate arterial enhancing lesion in the spleen.      She was evaluated by hepatology 3 years ago for mass found on abdominal CT. Per chart review it was a f/u for evaluation of elevated liver enzymes. She has undergone an extensive workup for her elevated liver enzymes. Serological workup was negative for Gino's, alpha-1 antitrypsin deficiency, hemochromatosis, autoimmune etiology, and viral hepatitis. Thyroid was normal. She had a CT scan to further evaluate her liver lesion seen on u/s. TPCT showed a focal 1.2-cm area of hypodensity identified within hepatic segment VI, which is not isodense on delayed phase imaging. Due its small size and imaging characteristics, it cannot be classified as a hemangioma and further follow-up, if clinically warranted, could be obtained with MRI in 3 to 6 months to document stability. Her AFP, CEA, CA 19-9, and  were all normal. She underwent u/s guided liver biopsy on 4/6/15 that revealed no significant  abnormality to explain her elevated transaminases.  FINAL PATHOLOGIC DIAGNOSIS  LIVER (NEEDLE BIOPSY)  -Very minimal lobular disarray/injury  -Rare macrosteatosis, less than 2%  -No iron  -No fibrosis  Supplemental Diagnosis  This addendum is issued for the results of ancillary studies.  PASD is negative. [place] red stain is negative for amyloid.  Keratin 7 is positive in bile ducts with no alterations (normal pattern staining).  All immunostain and special stain controls reacted appropriately.    Hospital Course:  She was transitioned to room air with diuresis. Her course was complicated by worsening FUENTES. Heme/Onc was consulted for further workup of underlying malignancy as labs were suggestive of MM. Nephrology was consulted for worsening FUENTES as it did not improved but worsened with diuresis. 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 During her course  reports intermittent confusion but it is her baseline. Her hb was in the 7s but she required 1u transfusion with diuresis when it fell to 6.3 on 05/04/2019. She continues to complain of L sided lower rib pain.     Interval History:   97% on RA this AM. Tolerating fluids. Lungs clear. Still reports left sided lower rib pain and point of thoracic tenderness correlating with lesion found on T7 spine. Lidocaine patch mildly improved pain. Had 2 Bms yesterday & reporting lots of flatus feeling uncomfortable today.     Review of Systems   Constitutional: Positive for chills. Negative for fever.   Respiratory: Negative for shortness of breath and wheezing.    Cardiovascular: Negative for chest pain and palpitations.   Gastrointestinal: Positive for abdominal distention and abdominal pain. Negative for diarrhea, nausea and vomiting.   Genitourinary: Negative for dysuria.     Objective:     Vital Signs (Most Recent):  Temp: 98.1 °F (36.7 °C) (05/05/19 0729)  Pulse: 74 (05/05/19  0733)  Resp: 16 (05/05/19 0729)  BP: (!) 131/90 (05/05/19 0729)  SpO2: 97 % (05/05/19 0729) Vital Signs (24h Range):  Temp:  [97.5 °F (36.4 °C)-98.2 °F (36.8 °C)] 98.1 °F (36.7 °C)  Pulse:  [74-94] 74  Resp:  [16-20] 16  SpO2:  [90 %-97 %] 97 %  BP: (126-139)/(59-90) 131/90     Weight: 77.1 kg (169 lb 15.6 oz)  Body mass index is 30.11 kg/m².    Intake/Output Summary (Last 24 hours) at 5/5/2019 0945  Last data filed at 5/5/2019 0500  Gross per 24 hour   Intake 1020 ml   Output 650 ml   Net 370 ml      Physical Exam   Constitutional: She is oriented to person, place, and time. She appears well-developed and well-nourished. No distress.   Dysarthria with hesitancy in speech noted- residual effect from previous CVA    HENT:   Head: Normocephalic and atraumatic.   Eyes: Pupils are equal, round, and reactive to light. EOM are normal.   Neck: Normal range of motion. Neck supple.   Cardiovascular: Normal rate, normal heart sounds and intact distal pulses.   No murmur heard.  Pulmonary/Chest: Effort normal. No respiratory distress. She has rales.   Abdominal: Soft. Bowel sounds are normal. She exhibits no distension. There is no tenderness. There is no guarding.   Musculoskeletal: Normal range of motion. She exhibits no edema.   Neurological: She is alert and oriented to person, place, and time.   Residual intention tremor of R hand from previous CVA   Skin: Skin is warm and dry. Capillary refill takes less than 2 seconds. She is not diaphoretic.   Psychiatric: She has a normal mood and affect. Her behavior is normal. Judgment and thought content normal.       Significant Labs: All pertinent labs within the past 24 hours have been reviewed.    Significant Imaging: I have reviewed all pertinent imaging results/findings within the past 24 hours.    Assessment/Plan:      * Acute respiratory failure with hypoxia  RESOLVED    Complicated by COPD resolving with diuresis however now complicated by TLS.  - CTPA negative for PE,  ground glass opacities found , can sondier following up with high resolution CT     - s/p transfusion 1prbc yesterday with Lasix   -Lasix 40mg QD for now        Acute on chronic diastolic heart failure  RESOLVED  Unclear clinically, complicated by TLS and metastatic malignancy. Will monitor with gentle hydration and start BiPAP if acute worsening resp distress.      Multiple lesions of metastatic malignancy  Previous history of breast cancer and workup for liver lesions years ago now presenting with protein gap and TLS suggestive of leukemia or Multiple myeloma. Pt with suspected IgG kappa multiple myeloma with M-spike of 6.75. She also met some  traditional criteria for disease as well with anemia (Hgb of 7.1), corrceted calcium of 10.6, renal dysfunction with Cr of 2.7, and new T7 verterbral fracture. Beta 2 microglobulin was elevated at 17.5 and albumin was 2.5     Appreciate assistance from oncology, bone marrow biopsy results pending   - Will give Dex 40 x 4 days per oncology recs     -will discuss with heme/onc possibility of lesion biopsies from spleen or liver to evaluate for malignancy source     Gammopathy  Worsening FUENTES with elevated protein gap, proteinuria  tonya ca of 10.6  Significantly elevated immunoglobulins   T7 concerning for lytic #     Nephrology & heme onc consulted, appreciate recs  -suspicion for MM & TLS  -started on allopurinol & light hydration in setting of s/p CHF exacerbation   -spep & upep pending   -hemolysis labs   -s/p bone bx pending results      Acute anemia  In setting of suspected malignancy likely of a dysplastic nature, Multiple myeloma or leukemia etc.    - Transfuse < 7 hgb     Hyperglycemia  -after initiating dexamethasone   -LDSSI       FUENTES (acute kidney injury)  Nephrology on board appreciate assistance.  Labs suggestive of TLS with unknown malignancy currently be worked up by Oncology with bone marrow biopsy pending.  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 She was seen and stated on alirocomab- and she is tolerating it well.    still elevated at 150 with HDL 43, LDL 79.6 . She is working on low-fat, low-cholesterol diet.      Essential hypertension  -home regimen of amlodipine 5mg, losartan-HCTZ 100-25mg  Holding in the setting of diuresis      Left ventricular diastolic dysfunction with preserved systolic function  - See principal problem for more elaboration.         Tremor  Residual effect of CVA      Hyperlipemia  -continuing ASA 81    Iron deficiency anemia   Hb of 7.1 on admit, type & screened, will monitor for overt bleeding    She has history of anemia, recent H and H has come up to   10.6 / 34  (1/2018 )  She is taking iron replacement. She had a colonoscopy in 2014 for colon polyps, EGD in 2012, and video capsule in Sept 2016: Gastritis.      - Multiple angioectasias without bleeding in the                         proximal small bowel.                        - A single angioectasia without bleeding in the                         ileum.  Gi recommended following up with H/H and continuing iron.  she does have some fatigue .   C-scope showed she had 1 polyp in 2015 and it was hyperplastic and she is due for a repeat in 2025.        VTE Risk Mitigation (From admission, onward)        Ordered     heparin (porcine) injection 5,000 Units  Every 8 hours      05/02/19 1500     IP VTE HIGH RISK PATIENT  Once      04/30/19 1750      Place CRUZ hose  Until discontinued      04/30/19 8793              Velma Joshi MD  Department of Hospital Medicine   [facility]

## 2019-05-05 NOTE — PT/OT/SLP EVAL
Physical Therapy Evaluation/Discharge     Patient Name:  Shelby Thompson   MRN:  [id number]    Recommendations:     Discharge Recommendations:  home   Discharge Equipment Recommendations: none   Barriers to discharge: None    Assessment:     Shelby Thompson is a 69 y.o. female admitted with a medical diagnosis of Acute respiratory failure with hypoxia. PT evaluation complete and no goals established. Pt is functioning at high level and does not required acute care physical therapy services. Education provided to ambulate in hallway 3x/day with RN in order to maintain strength and endurance. Pt verbalized understanding. Please re-consult if functional mobility changes. Anticipate d/c to home; no needs.      Pt safe to ambulate in hallway using RW with nursing/family     Rehab Prognosis: Good     Recent Surgery: * No surgery found *      Plan:     · D/c from acute PT    Subjective     Chief Complaint: none reported   Patient/Family Comments/goals: to get better and return home   Pain/Comfort:  · Pain Rating 1: 0/10  · Pain Rating Post-Intervention 1: 0/10    Patients cultural, spiritual, [employer] conflicts given the current situation: no    Living Environment:  Pt lives with  in a [facility] with 4 MARYA and B HR  Prior to admission, patients level of function was mod indep with ambulation (RW, rollator) and mod indep with ADLs.  Equipment used at home: rollator(pt's  adapted house for pt).  DME owned (not currently used): none.  Upon discharge, patient will have assistance from  .    Objective:     Communicated with RN prior to session.  Patient found up in chair with oxygen, peripheral IV  upon PT entry to room.    General Precautions: Standard, fall   Orthopedic Precautions:N/A   Braces: N/A     Exams:  · Cognitive Exam:    · AAOx4  · Follows multistep commands  · Communication clear/fluent   · RLE ROM: WFL  · RLE Strength: WFL  · LLE ROM: WFL  · LLE Strength: WFL    Functional Mobility:  · Bed  Mobility: NT 2nd to pt found in chair   · Transfers:     · Sit to Stand:  stand by assistance with rollator x 3 trials (chair, toilet)   · Gait: 250ft using rollator with supervision; O2 intact; no LOB with horizontal head turns, change of speed, or change in direction; no SOB       Therapeutic Activities and Exercises:  Educated pt on PT role/POC  Educated pt on importance of OOB activity and daily ambulation  Pt verbalized understanding       AM-PAC 6 CLICK MOBILITY  Total Score:22     Patient left up in chair with all lines intact, call button in reach, RN notified and  present.    GOALS:   Multidisciplinary Problems     Physical Therapy Goals     Not on file          Multidisciplinary Problems (Resolved)        Problem: Physical Therapy Goal    Goal Priority Disciplines Outcome Goal Variances Interventions   Physical Therapy Goal   (Resolved)     PT, PT/OT Outcome(s) achieved                     History:     Past Medical History:   Diagnosis Date    Acute respiratory failure with hypoxia 4/30/2019    FUENTES (acute kidney injury) 5/1/2019    Allergy     Anemia     Aortic aneurysm     Breast cancer 10/2011    left breast Stage 0 DCIS    Chronic diastolic congestive heart failure 11/6/2015    Colon polyp     GERD (gastroesophageal reflux disease)     History of colonic polyps     HX: breast cancer     Hyperlipemia     Hypertension     ICH (intracerebral hemorrhage)     Major depressive disorder, single episode, mild 6/23/2016    Nuclear sclerosis 7/21/2014    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 7/21/2014    PAD (peripheral artery disease) 11/6/2015    Stroke 4/2011       Past Surgical History:   Procedure Laterality Date    APPENDECTOMY      BIOPSY LIVER AND ULTRASOUND N/A 4/6/2015    Performed by [facility]    BREAST BIOPSY Left 10/2011    BREAST LUMPECTOMY Left 2011    DCIS    COLONOSCOPY      COLONOSCOPY N/A  3/26/2015    Performed by Wicho [facility]ard [facility] at [facility] ENDO (4TH [facility]LR)    CYST REMOVAL      back    ESOPHAGOGASTRODUODENOSCOPY  07/2016    duodenal angioectasia    ESOPHAGOGASTRODUODENOSCOPY (EGD) N/A 7/27/2016    Performed by Mlaik Sanchez MD at [facility] ENDO (4TH FLR)    FOOT FRACTURE SURGERY  10/2012    right foot, with R hallux valgus repair    FRACTURE SURGERY Right     broken toe repiar    HEMORRHOID SURGERY      LIVER BIOPSY      TUBAL LIGATION      UPPER GASTROINTESTINAL ENDOSCOPY         Time Tracking:     PT Received On: 05/05/19  PT Start Time: 0923     PT Stop Time: 0938  PT Total Time (min): 15 min     Billable Minutes: Evaluation 15     Corina Michaud, PT, DPT  5/5/2019  [phone]

## 2019-05-05 NOTE — ASSESSMENT & PLAN NOTE
Nephrology on board appreciate assistance.  Labs suggestive of TLS with unknown malignancy currently be worked up by Oncology with bone marrow biopsy pending.  - gentle hydration with 75 CC and lasix 40 BID   - Allopurinol improving Uric Acid levels & FUENTES  - BMP BID

## 2019-05-05 NOTE — PLAN OF CARE
Problem: Adult Inpatient Plan of Care  Goal: Plan of Care Review  Outcome: Revised  Pt free of falls/trauma/injuries.  Denies c/o SOB and CP. Discomfort managed with PO and topical analgesics.  Generalized skin remains CDI; trace edema noted.  Pt being diuresed with  PO Lasix once today; diuresing well. Blood glucose controlled with diet. Pt tolerating plan of care.

## 2019-05-05 NOTE — PLAN OF CARE
Problem: Physical Therapy Goal  Goal: Physical Therapy Goal  Outcome: Outcome(s) achieved Date Met: 05/05/19  Eval completed. Pt safe with functional mobility.

## 2019-05-05 NOTE — PT/OT/SLP EVAL
Occupational Therapy   Evaluation and Discharge Note    Name: Shelby Thompson  MRN: [id number]  Admitting Diagnosis:  Acute respiratory failure with hypoxia      Recommendations:     Discharge Recommendations: home  Discharge Equipment Recommendations:  none  Barriers to discharge:  None    Assessment:     Shelby Thompson is a 69 y.o. female with a medical diagnosis of Acute respiratory failure with hypoxia. At this time, patient is functioning at their prior level of function and does not require further acute OT services.     Plan:     During this hospitalization, patient does not require further acute OT services.  Please re-consult if situation changes.    · Plan of Care Reviewed with: patient, spouse    Subjective     Chief Complaint: none stated   Patient/Family Comments/goals: to return home     Occupational Profile:  Living Environment: Pt lives with her  in a H with 3 MARYA and B HRs present. Pt's  adapted home and bathrooms as handicap accessible. Pt has a tub/shower combo with grab bars present.   Previous level of function: PTA, pt was mod (I) using rollator and walker/wheelchair combo for community mobility. PTA, pt was (I) <>mod (I) for ADLs.   Roles and Routines: wife, home/community dweller   Equipment Used at home:  rollator, wheelchair, grab bar, walker, rolling, bedside commode(pt's  adapted home for pt )  Assistance upon Discharge: Pt reports her  is able to assist upon d/c     Pain/Comfort:  · Pain Rating 1: 0/10  · Pain Rating Post-Intervention 1: 0/10    Patients cultural, spiritual, Rastafari conflicts given the current situation: no    Objective:     Communicated with: RN prior to session.  Patient found up in chair with oxygen upon OT entry to room. Pt agreeable to therapy session with pt's  present.     General Precautions: Standard, fall   Orthopedic Precautions:N/A   Braces: N/A     Occupational Performance:    Bed Mobility:    · Not performed, pt  found sitting UIC.     Functional Mobility/Transfers:  · Patient completed Sit <> Stand Transfer EOB with stand by assistance  with rollator   · Patient completed Toilet Transfer functional ambulation with step transfer technique with stand by assistance with  grab bars and rollator   · Min verbal cues for locking rollator prior to standing from toilet for safety  Functional Mobility: Pt engaged in functional mobility simulating household/community mobility with SBA using rollator .   · Pt completed ~250ft with no LOB while on 1L of oxygen    Activities of Daily Living:  · Upper Body Dressing: stand by assistance for donning personal gown   · Toileting: stand by assistance simulated     Cognitive/Visual Perceptual:  Cognitive/Psychosocial Skills:     -       Oriented to: Person, Place, Time and Situation   -       Follows Commands/attention:Follows multistep  commands  -       Communication: clear/fluent  -       Memory: No Deficits noted  -       Safety awareness/insight to disability: intact   -       Mood/Affect/Coping skills/emotional control: Appropriate to situation  Visual/Perceptual:      -noted to be intact       Physical Exam:  Balance:    - Static sit: (I)   -  Dynamic sit: (I)   - Static standing: (S)  - Dynamic Standing: SBA    Postural examination/scapula alignment:    -       No postural abnormalities identified  Skin integrity: Visible skin intact  Edema:  None noted to B UEs  Sensation:    -       Intact  light/touch BUEs; pt did report numbness and tingling of R hand   Dominant hand:    -       right  Upper Extremity Range of Motion:     -       Right Upper Extremity: WFL  -       Left Upper Extremity: WFL  Upper Extremity Strength:    -       Right Upper Extremity: WFL  -       Left Upper Extremity: WFL   Strength:    -       Right Upper Extremity: WFL  -       Left Upper Extremity: WFL    AMPAC 6 Click ADL:  AMPAC Total Score: 20    Treatment & Education:  Pt educated by therapist on:   - Pt  educated on role of OT, POC, and d/c recommendations.   - Educated pt on being appropriate to transfer with nsg and PCT with x 1 person assist using rollator for safety. .   - Importance of OOB ax's with staff member assistance and sitting OOB majority of day.   - Pt completed ADLs and functional mobility for treatment session as noted above   - Pt verbalized understanding. Pt expressed no further concerns/questions.  - whiteboard updated   Education:    Patient left up in chair with all lines intact, call button in reach, RN notified and   present    GOALS:   Multidisciplinary Problems     Occupational Therapy Goals     Not on file          Multidisciplinary Problems (Resolved)        Problem: Occupational Therapy Goal    Goal Priority Disciplines Outcome Interventions   Occupational Therapy Goal   (Resolved)     OT, PT/OT Outcome(s) achieved                    History:     Past Medical History:   Diagnosis Date    Acute respiratory failure with hypoxia 4/30/2019    FUENTES (acute kidney injury) 5/1/2019    Allergy     Anemia     Aortic aneurysm     Breast cancer 10/2011    left breast Stage 0 DCIS    Chronic diastolic congestive heart failure 11/6/2015    Colon polyp     GERD (gastroesophageal reflux disease)     History of colonic polyps     HX: breast cancer     Hyperlipemia     Hypertension     ICH (intracerebral hemorrhage)     Major depressive disorder, single episode, mild 6/23/2016    Nuclear sclerosis 7/21/2014    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 7/21/2014    PAD (peripheral artery disease) 11/6/2015    Stroke 4/2011       Past Surgical History:   Procedure Laterality Date    APPENDECTOMY      BIOPSY LIVER AND ULTRASOUND N/A 4/6/2015    Performed by [facility] at [facility]    BREAST BIOPSY Left 10/2011    BREAST LUMPECTOMY Left 2011    DCIS    COLONOSCOPY      COLONOSCOPY N/A 3/26/2015    Performed by Wicho Woodard MD at  [facility] (4TH FLR)    CYST REMOVAL      back    ESOPHAGOGASTRODUODENOSCOPY  07/2016    duodenal angioectasia    ESOPHAGOGASTRODUODENOSCOPY (EGD) N/A 7/27/2016    Performed by Malik Sanchez MD at [facility] (4TH FLR)    FOOT FRACTURE SURGERY  10/2012    right foot, with R hallux valgus repair    FRACTURE SURGERY Right     broken toe repiar    HEMORRHOID SURGERY      LIVER BIOPSY      TUBAL LIGATION      UPPER GASTROINTESTINAL ENDOSCOPY         Time Tracking:     OT Date of Treatment: 05/05/19  OT Start Time: 0921  OT Stop Time: 0936  OT Total Time (min): 15 min    Billable Minutes:Evaluation 15    Kristi Anton, OT  5/5/2019

## 2019-05-06 ENCOUNTER — TELEPHONE (OUTPATIENT)
Dept: TRANSPLANT | Facility: HOSPITAL | Age: 69
End: 2019-05-06

## 2019-05-06 DIAGNOSIS — C90.00 MULTIPLE MYELOMA NOT HAVING ACHIEVED REMISSION: ICD-10-CM

## 2019-05-06 DIAGNOSIS — D49.2 BONE NEOPLASM: ICD-10-CM

## 2019-05-06 LAB
ALBUMIN SERPL BCP-MCNC: 2.9 G/DL (ref 3.5–5.2)
ALP SERPL-CCNC: 51 U/L (ref 55–135)
ALT SERPL W/O P-5'-P-CCNC: 33 U/L (ref 10–44)
ANION GAP SERPL CALC-SCNC: 7 MMOL/L (ref 8–16)
ANION GAP SERPL CALC-SCNC: 7 MMOL/L (ref 8–16)
AST SERPL-CCNC: 30 U/L (ref 10–40)
BASOPHILS # BLD AUTO: 0 K/UL (ref 0–0.2)
BASOPHILS NFR BLD: 0 % (ref 0–1.9)
BILIRUB SERPL-MCNC: 0.3 MG/DL (ref 0.1–1)
BODY SITE - BONE MARROW: NORMAL
BONE MARROW IRON STAIN COMMENT: NORMAL
BUN SERPL-MCNC: 43 MG/DL (ref 8–23)
BUN SERPL-MCNC: 43 MG/DL (ref 8–23)
CALCIUM SERPL-MCNC: 9.1 MG/DL (ref 8.7–10.5)
CALCIUM SERPL-MCNC: 9.1 MG/DL (ref 8.7–10.5)
CHLORIDE SERPL-SCNC: 102 MMOL/L (ref 95–110)
CHLORIDE SERPL-SCNC: 102 MMOL/L (ref 95–110)
CLINICAL DIAGNOSIS - BONE MARROW: NORMAL
CO2 SERPL-SCNC: 23 MMOL/L (ref 23–29)
CO2 SERPL-SCNC: 23 MMOL/L (ref 23–29)
CREAT SERPL-MCNC: 1.5 MG/DL (ref 0.5–1.4)
CREAT SERPL-MCNC: 1.5 MG/DL (ref 0.5–1.4)
DIFFERENTIAL METHOD: ABNORMAL
DNA/RNA EXTRACT AND HOLD RESULT: NORMAL
DNA/RNA EXTRACTION: NORMAL
EOSINOPHIL # BLD AUTO: 0 K/UL (ref 0–0.5)
EOSINOPHIL NFR BLD: 0 % (ref 0–8)
ERYTHROCYTE [DISTWIDTH] IN BLOOD BY AUTOMATED COUNT: 19 % (ref 11.5–14.5)
EST. GFR  (AFRICAN AMERICAN): 40.4 ML/MIN/1.73 M^2
EST. GFR  (AFRICAN AMERICAN): 40.4 ML/MIN/1.73 M^2
EST. GFR  (NON AFRICAN AMERICAN): 35 ML/MIN/1.73 M^2
EST. GFR  (NON AFRICAN AMERICAN): 35 ML/MIN/1.73 M^2
EXHR SPECIMEN TYPE: NORMAL
FLOW CYTOMETRY ANTIBODIES ANALYZED - BONE MARROW: NORMAL
FLOW CYTOMETRY COMMENT - BONE MARROW: NORMAL
FLOW CYTOMETRY INTERPRETATION - BONE MARROW: NORMAL
GLUCOSE SERPL-MCNC: 99 MG/DL (ref 70–110)
GLUCOSE SERPL-MCNC: 99 MG/DL (ref 70–110)
HCT VFR BLD AUTO: 25.2 % (ref 37–48.5)
HGB BLD-MCNC: 7.9 G/DL (ref 12–16)
IMM GRANULOCYTES # BLD AUTO: 0.03 K/UL (ref 0–0.04)
IMM GRANULOCYTES NFR BLD AUTO: 0.8 % (ref 0–0.5)
LYMPHOCYTES # BLD AUTO: 0.6 K/UL (ref 1–4.8)
LYMPHOCYTES NFR BLD: 14.7 % (ref 18–48)
MAGNESIUM SERPL-MCNC: 2.4 MG/DL (ref 1.6–2.6)
MCH RBC QN AUTO: 25 PG (ref 27–31)
MCHC RBC AUTO-ENTMCNC: 31.3 G/DL (ref 32–36)
MCV RBC AUTO: 80 FL (ref 82–98)
MONOCYTES # BLD AUTO: 0.2 K/UL (ref 0.3–1)
MONOCYTES NFR BLD: 6.2 % (ref 4–15)
NEUTROPHILS # BLD AUTO: 3 K/UL (ref 1.8–7.7)
NEUTROPHILS NFR BLD: 78.3 % (ref 38–73)
NRBC BLD-RTO: 0 /100 WBC
PHOSPHATE SERPL-MCNC: 4 MG/DL (ref 2.7–4.5)
PLATELET # BLD AUTO: 178 K/UL (ref 150–350)
PMV BLD AUTO: 10.9 FL (ref 9.2–12.9)
POCT GLUCOSE: 155 MG/DL (ref 70–110)
POCT GLUCOSE: 90 MG/DL (ref 70–110)
POTASSIUM SERPL-SCNC: 3.7 MMOL/L (ref 3.5–5.1)
POTASSIUM SERPL-SCNC: 3.7 MMOL/L (ref 3.5–5.1)
PROT PATTERN UR ELPH-IMP: NORMAL
PROT SERPL-MCNC: 13.5 G/DL (ref 6–8.4)
RBC # BLD AUTO: 3.16 M/UL (ref 4–5.4)
SODIUM SERPL-SCNC: 132 MMOL/L (ref 136–145)
SODIUM SERPL-SCNC: 132 MMOL/L (ref 136–145)
URATE SERPL-MCNC: 9.8 MG/DL (ref 2.4–5.7)
WBC # BLD AUTO: 3.87 K/UL (ref 3.9–12.7)

## 2019-05-06 PROCEDURE — 25000003 PHARM REV CODE 250: Mod: HCNC | Performed by: STUDENT IN AN ORGANIZED HEALTH CARE EDUCATION/TRAINING PROGRAM

## 2019-05-06 PROCEDURE — 85025 COMPLETE CBC W/AUTO DIFF WBC: CPT | Mod: HCNC

## 2019-05-06 PROCEDURE — 84100 ASSAY OF PHOSPHORUS: CPT | Mod: HCNC

## 2019-05-06 PROCEDURE — 36415 COLL VENOUS BLD VENIPUNCTURE: CPT | Mod: HCNC

## 2019-05-06 PROCEDURE — 20600001 HC STEP DOWN PRIVATE ROOM: Mod: HCNC

## 2019-05-06 PROCEDURE — 99233 PR SUBSEQUENT HOSPITAL CARE,LEVL III: ICD-10-PCS | Mod: HCNC,GC,, | Performed by: HOSPITALIST

## 2019-05-06 PROCEDURE — 63600175 PHARM REV CODE 636 W HCPCS: Mod: HCNC | Performed by: STUDENT IN AN ORGANIZED HEALTH CARE EDUCATION/TRAINING PROGRAM

## 2019-05-06 PROCEDURE — 80053 COMPREHEN METABOLIC PANEL: CPT | Mod: HCNC

## 2019-05-06 PROCEDURE — 84550 ASSAY OF BLOOD/URIC ACID: CPT | Mod: HCNC

## 2019-05-06 PROCEDURE — 99233 SBSQ HOSP IP/OBS HIGH 50: CPT | Mod: HCNC,GC,, | Performed by: HOSPITALIST

## 2019-05-06 PROCEDURE — 83735 ASSAY OF MAGNESIUM: CPT | Mod: HCNC

## 2019-05-06 PROCEDURE — 94761 N-INVAS EAR/PLS OXIMETRY MLT: CPT | Mod: HCNC

## 2019-05-06 RX ORDER — SODIUM CHLORIDE 9 MG/ML
INJECTION, SOLUTION INTRAVENOUS CONTINUOUS
Status: DISCONTINUED | OUTPATIENT
Start: 2019-05-06 | End: 2019-05-07

## 2019-05-06 RX ADMIN — SEVELAMER CARBONATE 800 MG: 800 TABLET, FILM COATED ORAL at 12:05

## 2019-05-06 RX ADMIN — LIDOCAINE 1 PATCH: 50 PATCH TOPICAL at 02:05

## 2019-05-06 RX ADMIN — POLYETHYLENE GLYCOL 3350 17 G: 17 POWDER, FOR SOLUTION ORAL at 08:05

## 2019-05-06 RX ADMIN — ACETAMINOPHEN 650 MG: 325 TABLET ORAL at 09:05

## 2019-05-06 RX ADMIN — DEXAMETHASONE SODIUM PHOSPHATE 40 MG: 4 INJECTION, SOLUTION INTRAMUSCULAR; INTRAVENOUS at 09:05

## 2019-05-06 RX ADMIN — SERTRALINE HYDROCHLORIDE 50 MG: 50 TABLET ORAL at 08:05

## 2019-05-06 RX ADMIN — PANTOPRAZOLE SODIUM 40 MG: 40 TABLET, DELAYED RELEASE ORAL at 08:05

## 2019-05-06 RX ADMIN — SEVELAMER CARBONATE 800 MG: 800 TABLET, FILM COATED ORAL at 05:05

## 2019-05-06 RX ADMIN — HEPARIN SODIUM 5000 UNITS: 5000 INJECTION, SOLUTION INTRAVENOUS; SUBCUTANEOUS at 02:05

## 2019-05-06 RX ADMIN — GABAPENTIN 300 MG: 300 CAPSULE ORAL at 09:05

## 2019-05-06 RX ADMIN — SODIUM CHLORIDE: 0.9 INJECTION, SOLUTION INTRAVENOUS at 09:05

## 2019-05-06 RX ADMIN — HEPARIN SODIUM 5000 UNITS: 5000 INJECTION, SOLUTION INTRAVENOUS; SUBCUTANEOUS at 09:05

## 2019-05-06 RX ADMIN — FUROSEMIDE 40 MG: 10 INJECTION, SOLUTION INTRAMUSCULAR; INTRAVENOUS at 08:05

## 2019-05-06 RX ADMIN — ASPIRIN 81 MG CHEWABLE TABLET 81 MG: 81 TABLET CHEWABLE at 08:05

## 2019-05-06 RX ADMIN — HEPARIN SODIUM 5000 UNITS: 5000 INJECTION, SOLUTION INTRAVENOUS; SUBCUTANEOUS at 06:05

## 2019-05-06 RX ADMIN — ALLOPURINOL 200 MG: 100 TABLET ORAL at 08:05

## 2019-05-06 RX ADMIN — SEVELAMER CARBONATE 800 MG: 800 TABLET, FILM COATED ORAL at 08:05

## 2019-05-06 RX ADMIN — GABAPENTIN 300 MG: 300 CAPSULE ORAL at 08:05

## 2019-05-06 NOTE — PROGRESS NOTES
[facility] Hospital Medicine  Progress Note    Patient Name: Shelby Thompson  MRN: [id number]  Patient Class: IP- Inpatient   Admission Date: 4/30/2019  Length of Stay: 6 days  Attending Physician: Minda Ruffin MD  Primary Care Provider: Emanuel Arevalo MD    [facility] Medicine Team: [facility]OSP MED 5 Srikanth Olvera MD    Subjective:     Principal Problem:Acute respiratory failure with hypoxia    HPI:  Ms. 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    She was seen by her PCP Dr. Arevalo this morning, who recorded an SaO2 of 87% and sent her to the ED for evaluation of PE. She is also reporting left flank and chest pain. She states that the pain feels like it's under her breast and wraps around her side to her back. She gets relief with tramadol and by sitting on the edge of her recliner chair at home. The pain is constant, but does not radiate. Palpation makes the pain worse. She denies any nausea, vomiting, diaphoresis or worsening SOB with increases in her pain. Patient reports that she used to be able to get around without an issue, but now gets so short of breath going from one room to another that she has to sit down.     Of note, she has a hx of CVA (residual deficits of weakness and tremor on right side). She uses a rollator to get around, and there is no acute change with this. Patient's  states that the PCP was also concerned about her recent CT scan and wanted her to be admitted for continued work up of her abnormal CT scan. Patient denies any issues with bleeding, bruising, or LE edema.    6 months ago she had a normal  mammogram. Will she had a EGD back in 2016 which showed a nonbleeding angio ectasia.  A colonoscopy back in March 2015 showing a 5 mm hyperplastic polyp     Liver u/s 04/04/2019  Innumerable new hepatic lesions concerning for metastatic disease.  Further evaluation with contrast-enhanced CT is recommended.  1.8 cm peripancreatic lymph node.    CT Abdomen/Pelvis 4/10/2019  Innumerable indeterminate enhancing lesions identified throughout the liver.  No definitive washout on delayed phases.  Findings are indeterminate by CT criteria, metastatic disease is not excluded.  Recommend further evaluation with MRI liver mass protocol.  Enhancing retroperitoneal soft tissue lesion, likely an enlarged retroperitoneal lymph node.  Findings are concerning for metastasis in the setting of occult malignancy.  Thickened endometrium, underlying neoplasm cannot.  Recommend further evaluation with pelvic ultrasound or MRI.  Indeterminate ill-defined hypoattenuating lesion in hepatic segment VI.  Indeterminate arterial enhancing lesion in the spleen.      She was evaluated by hepatology 3 years ago for mass found on abdominal CT. Per chart review it was a f/u for evaluation of elevated liver enzymes. She has undergone an extensive workup for her elevated liver enzymes. Serological workup was negative for Gino's, alpha-1 antitrypsin deficiency, hemochromatosis, autoimmune etiology, and viral hepatitis. Thyroid was normal. She had a CT scan to further evaluate her liver lesion seen on u/s. TPCT showed a focal 1.2-cm area of hypodensity identified within hepatic segment VI, which is not isodense on delayed phase imaging. Due its small size and imaging characteristics, it cannot be classified as a hemangioma and further follow-up, if clinically warranted, could be obtained with MRI in 3 to 6 months to document stability. Her AFP, CEA, CA 19-9, and  were all normal. She underwent u/s guided liver biopsy on 4/6/15 that revealed no  significant abnormality to explain her elevated transaminases.  FINAL PATHOLOGIC DIAGNOSIS  LIVER (NEEDLE BIOPSY)  -Very minimal lobular disarray/injury  -Rare macrosteatosis, less than 2%  -No iron  -No fibrosis  Supplemental Diagnosis  This addendum is issued for the results of ancillary studies.  PASD is negative. [place] red stain is negative for amyloid.  Keratin 7 is positive in bile ducts with no alterations (normal pattern staining).  All immunostain and special stain controls reacted appropriately.    Hospital Course:  She was transitioned to room air with diuresis. Her course was complicated by worsening FUENTES. Heme/Onc was consulted for further workup of underlying malignancy as labs were suggestive of MM. Nephrology was consulted for worsening FUENTES as it did not improved but worsened with diuresis. 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 During her course  reports intermittent confusion but it is her baseline. Her hb was in the 7s but she required 1u transfusion with diuresis when it fell to 6.3 on 05/04/2019. BM Bx pending    Interval History:    Patient feels subjectively better, on 1L Nc    Review of Systems   Constitutional: Negative for chills and fever.   Respiratory: Negative for shortness of breath and wheezing.    Cardiovascular: Negative for chest pain and palpitations.   Gastrointestinal: Negative for abdominal distention, abdominal pain, diarrhea, nausea and vomiting.   Genitourinary: Negative for dysuria.     Objective:     Vital Signs (Most Recent):  Temp: 99.2 °F (37.3 °C) (05/06/19 1536)  Pulse: 87 (05/06/19 1536)  Resp: 16 (05/06/19 1536)  BP: (!) 140/80(pt just moved herself from lying to sitting up) (05/06/19 1536)  SpO2: (!) 94 % (05/06/19 1536) Vital Signs (24h Range):  Temp:  [98 °F (36.7 °C)-99.2 °F (37.3 °C)] 99.2 °F (37.3 °C)  Pulse:  [63-87] 87  Resp:  [16-19] 16  SpO2:  [92 %-98 %] 94 %  BP:  (131-143)/(73-88) 140/80     Weight: 77 kg (169 lb 13.8 oz)  Body mass index is 30.09 kg/m².    Intake/Output Summary (Last 24 hours) at 5/6/2019 1704  Last data filed at 5/6/2019 1300  Gross per 24 hour   Intake 900 ml   Output 1600 ml   Net -700 ml      Physical Exam   Constitutional: She is oriented to person, place, and time. She appears well-developed and well-nourished. No distress.   Dysarthria with hesitancy in speech noted- residual effect from previous CVA    HENT:   Head: Normocephalic and atraumatic.   Eyes: Pupils are equal, round, and reactive to light. EOM are normal.   Neck: Normal range of motion. Neck supple.   Cardiovascular: Normal rate, normal heart sounds and intact distal pulses.   No murmur heard.  Pulmonary/Chest: Effort normal. No respiratory distress. She has no rales.   Abdominal: Soft. Bowel sounds are normal. She exhibits no distension. There is no tenderness. There is no guarding.   Musculoskeletal: Normal range of motion. She exhibits edema.   Neurological: She is alert and oriented to person, place, and time.   Residual intention tremor of R hand from previous CVA   Skin: Skin is warm and dry. Capillary refill takes less than 2 seconds. She is not diaphoretic.   Psychiatric: She has a normal mood and affect. Her behavior is normal. Judgment and thought content normal.       Significant Labs: All pertinent labs within the past 24 hours have been reviewed.    Significant Imaging: I have reviewed all pertinent imaging results/findings within the past 24 hours.    Assessment/Plan:      * Acute respiratory failure with hypoxia  Complicated by COPD resolving with diuresis however now complicated by TLS.  - CTPA negative for PE, ground glass opacities found ,   - s/p transfusion 1prbc yesterday with Lasix   -Lasix 40mg QD for now for underlying HF  - Improving        Hyperglycemia  -after initiating dexamethasone   -LDSSI       Gammopathy  Worsening FUENTES with elevated protein gap,  proteinuria  tonya ca of 10.6  Significantly elevated immunoglobulins   T7 concerning for lytic #     Nephrology & heme onc consulted, appreciate recs  -suspicion for MM & TLS  -started on allopurinol & light hydration in setting of s/p CHF exacerbation   -spep & upep pending   -hemolysis labs   -s/p bone bx pending results      FUENTES (acute kidney injury)  Nephrology on board appreciate assistance.  Labs suggestive of TLS with unknown malignancy currently be worked up by Oncology with bone marrow biopsy pending.  - gentle hydration with 75 CC and lasix 40 BID   - Allopurinol improving Uric Acid levels & FUENTES  - BMP BID       Acute anemia  In setting of suspected malignancy likely of a dysplastic nature, Multiple myeloma or leukemia etc.    - Transfuse < 7 hgb         Multiple lesions of metastatic malignancy  Previous history of breast cancer and workup for liver lesions years ago now presenting with protein gap and TLS suggestive of leukemia or Multiple myeloma. Pt with suspected IgG kappa multiple myeloma with M-spike of 6.75. She also met some  traditional criteria for disease as well with anemia (Hgb of 7.1), corrceted calcium of 10.6, renal dysfunction with Cr of 2.7, and new T7 verterbral fracture. 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 She was seen and stated on alirocomab- and she is tolerating it well.    still elevated at 150 with HDL 43, LDL 79.6 . She is working on low-fat, low-cholesterol diet.       Essential hypertension  -home regimen of amlodipine 5mg, losartan-HCTZ 100-25mg are held    -Monitor BP      Tremor  Residual effect of CVA      Hyperlipemia  -continuing ASA 81    Iron deficiency anemia  Previous Angiectasia on in SI,  C-scope showed she had 1 polyp in 2015 and it was hyperplastic    - On Iron replacement  - Monitor CBC, transfuse PRBC > 7      VTE Risk Mitigation (From admission, onward)        Ordered     heparin (porcine) injection 5,000 Units  Every 8 hours      05/02/19 1500     IP VTE HIGH RISK PATIENT  Once      04/30/19 1750     Place CRUZ hose  Until discontinued      04/30/19 1515              Srikanth Olvera MD  Department of Hospital Medicine   [facility]

## 2019-05-06 NOTE — PLAN OF CARE
Problem: Adult Inpatient Plan of Care  Goal: Plan of Care Review  Plan of care discussed with patient. Patient is free of fall/trauma/injury. NS gtt stopped per MAR.  Ambulated with walker and stand-by assistance.  VS stable and no acute distress overnight.  Denies CP, SOB, or pain/discomfort. All questions addressed. Will continue to monitor.

## 2019-05-06 NOTE — ASSESSMENT & PLAN NOTE
Complicated by COPD resolving with diuresis however now complicated by TLS.  - CTPA negative for PE, ground glass opacities found ,   - s/p transfusion 1prbc yesterday with Lasix   -Lasix 40mg QD for now for underlying HF  - Improving

## 2019-05-06 NOTE — PROGRESS NOTES
[facility]  Nephrology  Progress Note    Patient Name: Shelby Thompson  MRN: [id number]  Admission Date: 4/30/2019  Hospital Length of Stay: 6 days  Attending Provider: Minda Ruffin MD   Primary Care Physician: Emanuel Arevalo MD  Principal Problem:Acute respiratory failure with hypoxia    Subjective:     Interval History:  No acute events overnight.  Patient reports feeling well this morning without any complaints and reports no change in her urinary frequency, hematuria, or dysuria.    Review of patient's allergies indicates:   Allergen Reactions    Pravastatin Other (See Comments)     Itching, elevated cpk, muscle aches     Current Facility-Administered Medications   Medication Frequency    0.9%  NaCl infusion Continuous    acetaminophen tablet 650 mg Q8H PRN    albuterol-ipratropium 2.5 mg-0.5 mg/3 mL nebulizer solution 3 mL Q6H PRN    allopurinol tablet 200 mg Daily    aspirin chewable tablet 81 mg Daily    dextrose 50% injection 12.5 g PRN    dextrose 50% injection 25 g PRN    furosemide injection 40 mg Daily    gabapentin capsule 300 mg BID    glucagon (human recombinant) injection 1 mg PRN    glucose chewable tablet 16 g PRN    glucose chewable tablet 24 g PRN    heparin (porcine) injection 5,000 Units Q8H    insulin aspart U-100 pen 0-5 Units Q6H PRN    lidocaine 5 % patch 1 patch Q24H    pantoprazole EC tablet 40 mg Daily    polyethylene glycol packet 17 g Daily    sertraline tablet 50 mg Daily    sevelamer carbonate tablet 800 mg TID WM    sodium chloride 0.9% flush 10 mL PRN       Objective:     Vital Signs (Most Recent):  Temp: 98.9 °F (37.2 °C) (05/06/19 1224)  Pulse: 84 (05/06/19 1224)  Resp: 18 (05/06/19 1224)  BP: 131/73 (05/06/19 1224)  SpO2: 96 % (05/06/19 1224)  O2 Device (Oxygen Therapy): nasal cannula (05/06/19 1224) Vital Signs (24h Range):  Temp:  [96.1 °F (35.6 °C)-98.9 °F (37.2 °C)] 98.9 °F (37.2 °C)  Pulse:  [63-86] 84  Resp:  [16-19] 18  SpO2:   [92 %-98 %] 96 %  BP: (128-143)/(73-89) 131/73     Weight: 77 kg (169 lb 13.8 oz) (05/06/19 0800)  Body mass index is 30.09 kg/m².  Body surface area is 1.85 meters squared.    I/O last 3 completed shifts:  In: 1560 [P.O.:1560]  Out: 1950 [Urine:1950]    Physical Exam   Constitutional: She is oriented to person, place, and time. No distress.   Cardiovascular: Normal rate, regular rhythm and intact distal pulses. Exam reveals no gallop and no friction rub.   No murmur heard.  Pulmonary/Chest: Breath sounds normal. No respiratory distress. She has no wheezes. She has no rales.   Abdominal: Soft. Bowel sounds are normal. She exhibits no distension. There is no tenderness.   Neurological: She is alert and oriented to person, place, and time.       Significant Labs:  BMP:   Recent Labs   Lab 05/06/19  0531   GLU 99  99     102   CO2 23  23   BUN 43*  43*   CREATININE 1.5*  1.5*   CALCIUM 9.1  9.1   MG 2.4     All labs within the past 24 hours have been reviewed.     Significant Imaging:  None    Assessment/Plan:     FUENTES (acute kidney injury)  FUENTES is rapidly resolving w/ gentle IVF suggesting there was some element of hypovolemia present.  She does fit the definition of tumor lysis syndrome by the [place]-Juárez criteria w/ uric acid > 8 mg/dL, phos > 4.5 mg/dL, and SrCr > 1.5 x ULN but without it's hard to say this is definitely present without pathological confirmation of malignancy.  Her urine protein dose not suggest cast nephropathy.      Recommendation:  -Continue Renvela 800 mg po TID  -Continue allopurinol 200 mg po qd  -Continue NS IVF at 75 cc/hr  -We will continue to follow along peripherally      Thank you for your consult. I will follow-up with patient. Please contact us if you have any additional questions.    Roshan Fournier MD  Nephrology  [facility]

## 2019-05-06 NOTE — SUBJECTIVE & OBJECTIVE
Interval History:    Patient feels subjectively better, on 1L N[place]    Review of Systems   Constitutional: Negative for chills and fever.   Respiratory: Negative for shortness of breath and wheezing.    Cardiovascular: Negative for chest pain and palpitations.   Gastrointestinal: Negative for abdominal distention, abdominal pain, diarrhea, nausea and vomiting.   Genitourinary: Negative for dysuria.     Objective:     Vital Signs (Most Recent):  Temp: 99.2 °F (37.3 °C) (05/06/19 1536)  Pulse: 87 (05/06/19 1536)  Resp: 16 (05/06/19 1536)  BP: (!) 140/80(pt just moved herself from lying to sitting up) (05/06/19 1536)  SpO2: (!) 94 % (05/06/19 1536) Vital Signs (24h Range):  Temp:  [98 °F (36.7 °C)-99.2 °F (37.3 °C)] 99.2 °F (37.3 °C)  Pulse:  [63-87] 87  Resp:  [16-19] 16  SpO2:  [92 %-98 %] 94 %  BP: (131-143)/(73-88) 140/80     Weight: 77 kg (169 lb 13.8 oz)  Body mass index is 30.09 kg/m².    Intake/Output Summary (Last 24 hours) at 5/6/2019 1704  Last data filed at 5/6/2019 1300  Gross per 24 hour   Intake 900 ml   Output 1600 ml   Net -700 ml      Physical Exam   Constitutional: She is oriented to person, place, and time. She appears well-developed and well-nourished. No distress.   Dysarthria with hesitancy in speech noted- residual effect from previous CVA    HENT:   Head: Normocephalic and atraumatic.   Eyes: Pupils are equal, round, and reactive to light. EOM are normal.   Neck: Normal range of motion. Neck supple.   Cardiovascular: Normal rate, normal heart sounds and intact distal pulses.   No murmur heard.  Pulmonary/Chest: Effort normal. No respiratory distress. She has no rales.   Abdominal: Soft. Bowel sounds are normal. She exhibits no distension. There is no tenderness. There is no guarding.   Musculoskeletal: Normal range of motion. She exhibits edema.   Neurological: She is alert and oriented to person, place, and time.   Residual intention tremor of R hand from previous CVA   Skin: Skin is warm and  dry. Capillary refill takes less than 2 seconds. She is not diaphoretic.   Psychiatric: She has a normal mood and affect. Her behavior is normal. Judgment and thought content normal.       Significant Labs: All pertinent labs within the past 24 hours have been reviewed.    Significant Imaging: I have reviewed all pertinent imaging results/findings within the past 24 hours.

## 2019-05-06 NOTE — PHARMACY MED REC
"Admission Medication Reconciliation - Pharmacy Consult Note    The home medication history was taken by Ebonie Perry Pharmacy Tech.  Based on information gathered and subsequent review by the clinical pharmacist, the items below may need attention.    You may go to "Admission" then "Reconcile Home Medications" tabs to review and/or act upon these items.      No issues noted with the medication reconciliation.      Please address this information as you see fit.  Feel free to contact us if you have any questions or require assistance.    Becky Neal, EricD  [id number]                  .    .          "

## 2019-05-06 NOTE — ASSESSMENT & PLAN NOTE
Previous Angiectasia on in SI,  C-scope showed she had 1 polyp in 2015 and it was hyperplastic    - On Iron replacement  - Monitor CBC, transfuse PRBC > 7

## 2019-05-06 NOTE — SUBJECTIVE & OBJECTIVE
Interval History:  No acute events overnight.  Patient reports feeling well this morning without any complaints and reports no change in her urinary frequency, hematuria, or dysuria.    Review of patient's allergies indicates:   Allergen Reactions    Pravastatin Other (See Comments)     Itching, elevated cpk, muscle aches     Current Facility-Administered Medications   Medication Frequency    0.9%  NaCl infusion Continuous    acetaminophen tablet 650 mg Q8H PRN    albuterol-ipratropium 2.5 mg-0.5 mg/3 mL nebulizer solution 3 mL Q6H PRN    allopurinol tablet 200 mg Daily    aspirin chewable tablet 81 mg Daily    dextrose 50% injection 12.5 g PRN    dextrose 50% injection 25 g PRN    furosemide injection 40 mg Daily    gabapentin capsule 300 mg BID    glucagon (human recombinant) injection 1 mg PRN    glucose chewable tablet 16 g PRN    glucose chewable tablet 24 g PRN    heparin (porcine) injection 5,000 Units Q8H    insulin aspart U-100 pen 0-5 Units Q6H PRN    lidocaine 5 % patch 1 patch Q24H    pantoprazole EC tablet 40 mg Daily    polyethylene glycol packet 17 g Daily    sertraline tablet 50 mg Daily    sevelamer carbonate tablet 800 mg TID WM    sodium chloride 0.9% flush 10 mL PRN       Objective:     Vital Signs (Most Recent):  Temp: 98.9 °F (37.2 °C) (05/06/19 1224)  Pulse: 84 (05/06/19 1224)  Resp: 18 (05/06/19 1224)  BP: 131/73 (05/06/19 1224)  SpO2: 96 % (05/06/19 1224)  O2 Device (Oxygen Therapy): nasal cannula (05/06/19 1224) Vital Signs (24h Range):  Temp:  [96.1 °F (35.6 °C)-98.9 °F (37.2 °C)] 98.9 °F (37.2 °C)  Pulse:  [63-86] 84  Resp:  [16-19] 18  SpO2:  [92 %-98 %] 96 %  BP: (128-143)/(73-89) 131/73     Weight: 77 kg (169 lb 13.8 oz) (05/06/19 0800)  Body mass index is 30.09 kg/m².  Body surface area is 1.85 meters squared.    I/O last 3 completed shifts:  In: 1560 [P.O.:1560]  Out: 1950 [Urine:1950]    Physical Exam   Constitutional: She is oriented to person, place, and time.  No distress.   Cardiovascular: Normal rate, regular rhythm and intact distal pulses. Exam reveals no gallop and no friction rub.   No murmur heard.  Pulmonary/Chest: Breath sounds normal. No respiratory distress. She has no wheezes. She has no rales.   Abdominal: Soft. Bowel sounds are normal. She exhibits no distension. There is no tenderness.   Neurological: She is alert and oriented to person, place, and time.       Significant Labs:  BMP:   Recent Labs   Lab 05/06/19  0531   GLU 99  99     102   CO2 23  23   BUN 43*  43*   CREATININE 1.5*  1.5*   CALCIUM 9.1  9.1   MG 2.4     All labs within the past 24 hours have been reviewed.     Significant Imaging:  None

## 2019-05-06 NOTE — PLAN OF CARE
Problem: Adult Inpatient Plan of Care  Goal: Plan of Care Review  Outcome: Revised  Pt free of falls/trauma/injuries.  Denies c/o SOB and CP. Discomfort managed with PO and topical analgesics.  Generalized skin remains CDI; trace edema noted.  Pt being diuresed with  PO Lasix once today; diuresing well. Blood glucose controlled with diet. Awaiting bone marrow biopsy results. Pt tolerating plan of care.

## 2019-05-06 NOTE — ASSESSMENT & PLAN NOTE
FUENTES is rapidly resolving w/ gentle IVF suggesting there was some element of hypovolemia present.  She does fit the definition of tumor lysis syndrome by the Jeremiah-Juárez criteria w/ uric acid > 8 mg/dL, phos > 4.5 mg/dL, and SrCr > 1.5 x ULN but without it's hard to say this is definitely present without pathological confirmation of malignancy.  Her urine protein dose not suggest cast nephropathy.      Recommendation:  -Continue Renvela 800 mg po TID  -Start sodium bicarb 1300 mg po TID  -Continue allopurinol 200 mg po qd  -Continue NS IVF at 75 cc/hr  -We will continue to follow along peripherally

## 2019-05-06 NOTE — ASSESSMENT & PLAN NOTE
FUENTES is rapidly resolving w/ gentle IVF suggesting there was some element of hypovolemia present.  She does fit the definition of tumor lysis syndrome by the Jeremiah-Juárez criteria w/ uric acid > 8 mg/dL, phos > 4.5 mg/dL, and SrCr > 1.5 x ULN but without it's hard to say this is definitely present without pathological confirmation of malignancy.  Her urine protein dose not suggest cast nephropathy.      Recommendation:  -Continue Renvela 800 mg po TID  -Continue allopurinol 200 mg po qd  -Continue NS IVF at 75 cc/hr  -We will continue to follow along peripherally

## 2019-05-07 LAB
ANION GAP SERPL CALC-SCNC: 6 MMOL/L (ref 8–16)
ANION GAP SERPL CALC-SCNC: 8 MMOL/L (ref 8–16)
BASOPHILS # BLD AUTO: 0 K/UL (ref 0–0.2)
BASOPHILS NFR BLD: 0 % (ref 0–1.9)
BUN SERPL-MCNC: 43 MG/DL (ref 8–23)
BUN SERPL-MCNC: 43 MG/DL (ref 8–23)
CALCIUM SERPL-MCNC: 8 MG/DL (ref 8.7–10.5)
CALCIUM SERPL-MCNC: 8.2 MG/DL (ref 8.7–10.5)
CHLORIDE SERPL-SCNC: 105 MMOL/L (ref 95–110)
CHLORIDE SERPL-SCNC: 107 MMOL/L (ref 95–110)
CO2 SERPL-SCNC: 22 MMOL/L (ref 23–29)
CO2 SERPL-SCNC: 23 MMOL/L (ref 23–29)
CREAT SERPL-MCNC: 1.3 MG/DL (ref 0.5–1.4)
CREAT SERPL-MCNC: 1.5 MG/DL (ref 0.5–1.4)
DIFFERENTIAL METHOD: ABNORMAL
EOSINOPHIL # BLD AUTO: 0 K/UL (ref 0–0.5)
EOSINOPHIL NFR BLD: 0 % (ref 0–8)
ERYTHROCYTE [DISTWIDTH] IN BLOOD BY AUTOMATED COUNT: 19.2 % (ref 11.5–14.5)
EST. GFR  (AFRICAN AMERICAN): 40.4 ML/MIN/1.73 M^2
EST. GFR  (AFRICAN AMERICAN): 48 ML/MIN/1.73 M^2
EST. GFR  (NON AFRICAN AMERICAN): 35 ML/MIN/1.73 M^2
EST. GFR  (NON AFRICAN AMERICAN): 41.7 ML/MIN/1.73 M^2
G6PD RBC-CCNT: 17 U/G HGB (ref 7–20.5)
GLUCOSE SERPL-MCNC: 87 MG/DL (ref 70–110)
GLUCOSE SERPL-MCNC: 87 MG/DL (ref 70–110)
HCT VFR BLD AUTO: 23.3 % (ref 37–48.5)
HGB BLD-MCNC: 7.1 G/DL (ref 12–16)
IMM GRANULOCYTES # BLD AUTO: 0.01 K/UL (ref 0–0.04)
IMM GRANULOCYTES NFR BLD AUTO: 0.3 % (ref 0–0.5)
LYMPHOCYTES # BLD AUTO: 0.6 K/UL (ref 1–4.8)
LYMPHOCYTES NFR BLD: 17.8 % (ref 18–48)
MAGNESIUM SERPL-MCNC: 2.2 MG/DL (ref 1.6–2.6)
MCH RBC QN AUTO: 24.7 PG (ref 27–31)
MCHC RBC AUTO-ENTMCNC: 30.5 G/DL (ref 32–36)
MCV RBC AUTO: 81 FL (ref 82–98)
MONOCYTES # BLD AUTO: 0.4 K/UL (ref 0.3–1)
MONOCYTES NFR BLD: 10.6 % (ref 4–15)
NEUTROPHILS # BLD AUTO: 2.4 K/UL (ref 1.8–7.7)
NEUTROPHILS NFR BLD: 71.3 % (ref 38–73)
NRBC BLD-RTO: 0 /100 WBC
PATHOLOGIST INTERPRETATION UPE: NORMAL
PHOSPHATE SERPL-MCNC: 3.2 MG/DL (ref 2.7–4.5)
PLATELET # BLD AUTO: 151 K/UL (ref 150–350)
PMV BLD AUTO: 11.3 FL (ref 9.2–12.9)
POCT GLUCOSE: 104 MG/DL (ref 70–110)
POCT GLUCOSE: 115 MG/DL (ref 70–110)
POCT GLUCOSE: 135 MG/DL (ref 70–110)
POCT GLUCOSE: 84 MG/DL (ref 70–110)
POTASSIUM SERPL-SCNC: 3.5 MMOL/L (ref 3.5–5.1)
POTASSIUM SERPL-SCNC: 3.7 MMOL/L (ref 3.5–5.1)
RBC # BLD AUTO: 2.87 M/UL (ref 4–5.4)
SODIUM SERPL-SCNC: 135 MMOL/L (ref 136–145)
SODIUM SERPL-SCNC: 136 MMOL/L (ref 136–145)
URATE SERPL-MCNC: 8.3 MG/DL (ref 2.4–5.7)
WBC # BLD AUTO: 3.31 K/UL (ref 3.9–12.7)

## 2019-05-07 PROCEDURE — 80048 BASIC METABOLIC PNL TOTAL CA: CPT | Mod: HCNC

## 2019-05-07 PROCEDURE — 84100 ASSAY OF PHOSPHORUS: CPT | Mod: HCNC

## 2019-05-07 PROCEDURE — 80048 BASIC METABOLIC PNL TOTAL CA: CPT | Mod: 91,HCNC

## 2019-05-07 PROCEDURE — 63600175 PHARM REV CODE 636 W HCPCS: Mod: HCNC | Performed by: STUDENT IN AN ORGANIZED HEALTH CARE EDUCATION/TRAINING PROGRAM

## 2019-05-07 PROCEDURE — 83735 ASSAY OF MAGNESIUM: CPT | Mod: HCNC

## 2019-05-07 PROCEDURE — 25000003 PHARM REV CODE 250: Mod: HCNC | Performed by: STUDENT IN AN ORGANIZED HEALTH CARE EDUCATION/TRAINING PROGRAM

## 2019-05-07 PROCEDURE — 99233 PR SUBSEQUENT HOSPITAL CARE,LEVL III: ICD-10-PCS | Mod: HCNC,GC,, | Performed by: HOSPITALIST

## 2019-05-07 PROCEDURE — 36415 COLL VENOUS BLD VENIPUNCTURE: CPT | Mod: HCNC

## 2019-05-07 PROCEDURE — 85025 COMPLETE CBC W/AUTO DIFF WBC: CPT | Mod: HCNC

## 2019-05-07 PROCEDURE — 20600001 HC STEP DOWN PRIVATE ROOM: Mod: HCNC

## 2019-05-07 PROCEDURE — 84550 ASSAY OF BLOOD/URIC ACID: CPT | Mod: HCNC

## 2019-05-07 PROCEDURE — 99233 SBSQ HOSP IP/OBS HIGH 50: CPT | Mod: HCNC,GC,, | Performed by: HOSPITALIST

## 2019-05-07 RX ORDER — FUROSEMIDE 10 MG/ML
40 INJECTION INTRAMUSCULAR; INTRAVENOUS 2 TIMES DAILY
Status: DISCONTINUED | OUTPATIENT
Start: 2019-05-07 | End: 2019-05-08 | Stop reason: HOSPADM

## 2019-05-07 RX ORDER — ALLOPURINOL 300 MG/1
300 TABLET ORAL DAILY
Status: DISCONTINUED | OUTPATIENT
Start: 2019-05-08 | End: 2019-05-08 | Stop reason: HOSPADM

## 2019-05-07 RX ORDER — SODIUM CHLORIDE 9 MG/ML
INJECTION, SOLUTION INTRAVENOUS CONTINUOUS
Status: DISCONTINUED | OUTPATIENT
Start: 2019-05-07 | End: 2019-05-08 | Stop reason: HOSPADM

## 2019-05-07 RX ADMIN — ACETAMINOPHEN 650 MG: 325 TABLET ORAL at 08:05

## 2019-05-07 RX ADMIN — SODIUM CHLORIDE: 0.9 INJECTION, SOLUTION INTRAVENOUS at 03:05

## 2019-05-07 RX ADMIN — GABAPENTIN 300 MG: 300 CAPSULE ORAL at 09:05

## 2019-05-07 RX ADMIN — HEPARIN SODIUM 5000 UNITS: 5000 INJECTION, SOLUTION INTRAVENOUS; SUBCUTANEOUS at 09:05

## 2019-05-07 RX ADMIN — POLYETHYLENE GLYCOL 3350 17 G: 17 POWDER, FOR SOLUTION ORAL at 08:05

## 2019-05-07 RX ADMIN — SEVELAMER CARBONATE 800 MG: 800 TABLET, FILM COATED ORAL at 05:05

## 2019-05-07 RX ADMIN — HEPARIN SODIUM 5000 UNITS: 5000 INJECTION, SOLUTION INTRAVENOUS; SUBCUTANEOUS at 02:05

## 2019-05-07 RX ADMIN — PANTOPRAZOLE SODIUM 40 MG: 40 TABLET, DELAYED RELEASE ORAL at 08:05

## 2019-05-07 RX ADMIN — SEVELAMER CARBONATE 800 MG: 800 TABLET, FILM COATED ORAL at 08:05

## 2019-05-07 RX ADMIN — FUROSEMIDE 40 MG: 10 INJECTION, SOLUTION INTRAMUSCULAR; INTRAVENOUS at 05:05

## 2019-05-07 RX ADMIN — LIDOCAINE 1 PATCH: 50 PATCH TOPICAL at 02:05

## 2019-05-07 RX ADMIN — ASPIRIN 81 MG CHEWABLE TABLET 81 MG: 81 TABLET CHEWABLE at 08:05

## 2019-05-07 RX ADMIN — GABAPENTIN 300 MG: 300 CAPSULE ORAL at 08:05

## 2019-05-07 RX ADMIN — SEVELAMER CARBONATE 800 MG: 800 TABLET, FILM COATED ORAL at 11:05

## 2019-05-07 RX ADMIN — ALLOPURINOL 200 MG: 100 TABLET ORAL at 08:05

## 2019-05-07 RX ADMIN — FUROSEMIDE 40 MG: 10 INJECTION, SOLUTION INTRAMUSCULAR; INTRAVENOUS at 08:05

## 2019-05-07 RX ADMIN — SERTRALINE HYDROCHLORIDE 50 MG: 50 TABLET ORAL at 08:05

## 2019-05-07 RX ADMIN — HEPARIN SODIUM 5000 UNITS: 5000 INJECTION, SOLUTION INTRAVENOUS; SUBCUTANEOUS at 05:05

## 2019-05-07 NOTE — SUBJECTIVE & OBJECTIVE
Interval History:  Afebrile and no events overnight.  Remains on NC still with SOB.  Otherwise doing well.  Discussed plans of care regarding biopsy and Onc follow up     Review of Systems   Constitutional: Negative for chills and fever.   Respiratory: Negative for shortness of breath and wheezing.    Cardiovascular: Negative for chest pain and palpitations.   Gastrointestinal: Negative for abdominal distention, abdominal pain, diarrhea, nausea and vomiting.   Genitourinary: Negative for dysuria.     Objective:     Vital Signs (Most Recent):  Temp: 98 °F (36.7 °C) (05/07/19 1626)  Pulse: 75 (05/07/19 1626)  Resp: 18 (05/07/19 1626)  BP: (!) 149/76 (05/07/19 1626)  SpO2: 96 % (05/07/19 1626) Vital Signs (24h Range):  Temp:  [96.4 °F (35.8 °C)-98.7 °F (37.1 °C)] 98 °F (36.7 °C)  Pulse:  [67-80] 75  Resp:  [14-18] 18  SpO2:  [91 %-96 %] 96 %  BP: (124-165)/(73-94) 149/76     Weight: 77 kg (169 lb 13.8 oz)  Body mass index is 30.09 kg/m².    Intake/Output Summary (Last 24 hours) at 5/7/2019 1820  Last data filed at 5/7/2019 1514  Gross per 24 hour   Intake 6768.42 ml   Output 1800 ml   Net 4968.42 ml      Physical Exam   Constitutional: She is oriented to person, place, and time. She appears well-developed and well-nourished. No distress.   Dysarthria with hesitancy in speech noted- residual effect from previous CVA    HENT:   Head: Normocephalic and atraumatic.   Eyes: Pupils are equal, round, and reactive to light. EOM are normal.   Neck: Normal range of motion. Neck supple.   Cardiovascular: Normal rate, normal heart sounds and intact distal pulses.   No murmur heard.  Pulmonary/Chest: Effort normal. No respiratory distress. She has no rales.   Abdominal: Soft. Bowel sounds are normal. She exhibits no distension. There is no tenderness. There is no guarding.   Musculoskeletal: Normal range of motion. She exhibits edema.   Neurological: She is alert and oriented to person, place, and time.   Residual intention tremor  of R hand from previous CVA   Skin: Skin is warm and dry. Capillary refill takes less than 2 seconds. She is not diaphoretic.   Psychiatric: She has a normal mood and affect. Her behavior is normal. Judgment and thought content normal.       Significant Labs: All pertinent labs within the past 24 hours have been reviewed.    Significant Imaging: I have reviewed all pertinent imaging results/findings within the past 24 hours.

## 2019-05-07 NOTE — PROGRESS NOTES
[facility] Hospital Medicine  Progress Note    Patient Name: Shelby Thompson  MRN: [id number]  Patient Class: IP- Inpatient   Admission Date: 4/30/2019  Length of Stay: 7 days  Attending Physician: Minda Ruffin MD  Primary Care Provider: Emanuel Arevalo MD    [facility] Medicine Team: [facility] HOSP MED 5 Jere Carrasco MD    Subjective:     Principal Problem:Acute respiratory failure with hypoxia    HPI:  Ms. 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    She was seen by her PCP Dr. Arevalo this morning, who recorded an SaO2 of 87% and sent her to the ED for evaluation of PE. She is also reporting left flank and chest pain. She states that the pain feels like it's under her breast and wraps around her side to her back. She gets relief with tramadol and by sitting on the edge of her recliner chair at home. The pain is constant, but does not radiate. Palpation makes the pain worse. She denies any nausea, vomiting, diaphoresis or worsening SOB with increases in her pain. Patient reports that she used to be able to get around without an issue, but now gets so short of breath going from one room to another that she has to sit down.     Of note, she has a hx of CVA (residual deficits of weakness and tremor on right side). She uses a rollator to get around, and there is no acute change with this. Patient's  states that the PCP was also concerned about her recent CT scan and wanted her to be admitted for continued work up of her abnormal CT scan. Patient denies any issues with bleeding, bruising, or LE edema.    6 months ago she had a normal mammogram.  Will she had a EGD back in 2016 which showed a nonbleeding angio ectasia.  A colonoscopy back in March 2015 showing a 5 mm hyperplastic polyp     Liver u/s 04/04/2019  Innumerable new hepatic lesions concerning for metastatic disease.  Further evaluation with contrast-enhanced CT is recommended.  1.8 cm peripancreatic lymph node.    CT Abdomen/Pelvis 4/10/2019  Innumerable indeterminate enhancing lesions identified throughout the liver.  No definitive washout on delayed phases.  Findings are indeterminate by CT criteria, metastatic disease is not excluded.  Recommend further evaluation with MRI liver mass protocol.  Enhancing retroperitoneal soft tissue lesion, likely an enlarged retroperitoneal lymph node.  Findings are concerning for metastasis in the setting of occult malignancy.  Thickened endometrium, underlying neoplasm cannot.  Recommend further evaluation with pelvic ultrasound or MRI.  Indeterminate ill-defined hypoattenuating lesion in hepatic segment VI.  Indeterminate arterial enhancing lesion in the spleen.      She was evaluated by hepatology 3 years ago for mass found on abdominal CT. Per chart review it was a f/u for evaluation of elevated liver enzymes. She has undergone an extensive workup for her elevated liver enzymes. Serological workup was negative for Gino's, alpha-1 antitrypsin deficiency, hemochromatosis, autoimmune etiology, and viral hepatitis. Thyroid was normal. She had a CT scan to further evaluate her liver lesion seen on u/s. TPCT showed a focal 1.2-cm area of hypodensity identified within hepatic segment VI, which is not isodense on delayed phase imaging. Due its small size and imaging characteristics, it cannot be classified as a hemangioma and further follow-up, if clinically warranted, could be obtained with MRI in 3 to 6 months to document stability. Her AFP, CEA, CA 19-9, and  were all normal. She underwent u/s guided liver biopsy on 4/6/15 that revealed no significant  abnormality to explain her elevated transaminases.  FINAL PATHOLOGIC DIAGNOSIS  LIVER (NEEDLE BIOPSY)  -Very minimal lobular disarray/injury  -Rare macrosteatosis, less than 2%  -No iron  -No fibrosis  Supplemental Diagnosis  This addendum is issued for the results of ancillary studies.  PASD is negative. [place] red stain is negative for amyloid.  Keratin 7 is positive in bile ducts with no alterations (normal pattern staining).  All immunostain and special stain controls reacted appropriately.    Hospital Course:  She was transitioned to room air with diuresis. Her course was complicated by worsening FUENTES. Heme/Onc was consulted for further workup of underlying malignancy as labs were suggestive of MM. Nephrology was consulted for worsening FUENTES as it did not improved but worsened with diuresis. 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 During her course  reports intermittent confusion but it is her baseline. Her hb was in the 7s but she required 1u transfusion with diuresis when it fell to 6.3 on 05/04/2019. BM Bx pending    Interval History:  Afebrile and no events overnight.  Remains on NC still with SOB.  Otherwise doing well.  Discussed plans of care regarding biopsy and Onc follow up     Review of Systems   Constitutional: Negative for chills and fever.   Respiratory: Negative for shortness of breath and wheezing.    Cardiovascular: Negative for chest pain and palpitations.   Gastrointestinal: Negative for abdominal distention, abdominal pain, diarrhea, nausea and vomiting.   Genitourinary: Negative for dysuria.     Objective:     Vital Signs (Most Recent):  Temp: 98 °F (36.7 °C) (05/07/19 1626)  Pulse: 75 (05/07/19 1626)  Resp: 18 (05/07/19 1626)  BP: (!) 149/76 (05/07/19 1626)  SpO2: 96 % (05/07/19 1626) Vital Signs (24h Range):  Temp:  [96.4 °F (35.8 °C)-98.7 °F (37.1 °C)] 98 °F (36.7 °C)  Pulse:  [67-80] 75  Resp:  [14-18]  18  SpO2:  [91 %-96 %] 96 %  BP: (124-165)/(73-94) 149/76     Weight: 77 kg (169 lb 13.8 oz)  Body mass index is 30.09 kg/m².    Intake/Output Summary (Last 24 hours) at 5/7/2019 1820  Last data filed at 5/7/2019 1514  Gross per 24 hour   Intake 6768.42 ml   Output 1800 ml   Net 4968.42 ml      Physical Exam   Constitutional: She is oriented to person, place, and time. She appears well-developed and well-nourished. No distress.   Dysarthria with hesitancy in speech noted- residual effect from previous CVA    HENT:   Head: Normocephalic and atraumatic.   Eyes: Pupils are equal, round, and reactive to light. EOM are normal.   Neck: Normal range of motion. Neck supple.   Cardiovascular: Normal rate, normal heart sounds and intact distal pulses.   No murmur heard.  Pulmonary/Chest: Effort normal. No respiratory distress. She has no rales.   Abdominal: Soft. Bowel sounds are normal. She exhibits no distension. There is no tenderness. There is no guarding.   Musculoskeletal: Normal range of motion. She exhibits edema.   Neurological: She is alert and oriented to person, place, and time.   Residual intention tremor of R hand from previous CVA   Skin: Skin is warm and dry. Capillary refill takes less than 2 seconds. She is not diaphoretic.   Psychiatric: She has a normal mood and affect. Her behavior is normal. Judgment and thought content normal.       Significant Labs: All pertinent labs within the past 24 hours have been reviewed.    Significant Imaging: I have reviewed all pertinent imaging results/findings within the past 24 hours.    Assessment/Plan:      * Acute respiratory failure with hypoxia  Complicated by COPD resolving with diuresis however now complicated by TLS.  Will increase lasix to BID 5/07  - CTPA negative for PE, ground glass opacities found ,   - s/p transfusion 1prbc yesterday with Lasix   -Lasix 40mg QD for now for underlying HF  - Improving        Multiple lesions of metastatic malignancy  Previous  history of breast cancer and workup for liver lesions years ago now presenting with protein gap and TLS suggestive of leukemia or Multiple myeloma. Pt with suspected IgG kappa multiple myeloma with M-spike of 6.75. She also met some  traditional criteria for disease as well with anemia (Hgb of 7.1), corrceted calcium of 10.6, renal dysfunction with Cr of 2.7, and new T7 verterbral fracture. Beta 2 microglobulin was elevated at 17.5 and albumin was 2.5     Appreciate assistance from oncology, bone marrow biopsy results pending   - Will give Dex 40 x 4 days per oncology recs     -will discuss with heme/onc possibility of lesion biopsies from spleen or liver to evaluate for malignancy source     Acute on chronic diastolic heart failure  - On Lasix  - Euvolemic    Hyperglycemia  -after initiating dexamethasone   -LDSSI       Gammopathy  Per Onc, awaiting biopsy       FUENTES (acute kidney injury)  Nephrology on board appreciate assistance.  Labs suggestive of TLS with unknown malignancy currently be worked up by Oncology with bone marrow biopsy pending.  - gentle hydration with 75 CC and lasix 40 BID   - Allopurinol renally dosed   - BMP BID       Acute anemia  In setting of suspected malignancy likely of a dysplastic nature, Multiple myeloma or leukemia etc.    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 She was seen and stated on alirocomab- and she is tolerating it well.    still elevated at 150 with HDL 43, LDL 79.6 . She is working on low-fat, low-cholesterol  diet.      Essential hypertension  -home regimen of amlodipine 5mg, losartan-HCTZ 100-25mg are held    -Monitor BP      Tremor  Residual effect of CVA      Hyperlipemia  -continuing ASA 81    Iron deficiency anemia  Previously iron deficient now likely complicated by unknown malignancy,.    - On Iron replacement  - Monitor CBC, transfuse PRBC > 7      VTE Risk Mitigation (From admission, onward)        Ordered     heparin (porcine) injection 5,000 Units  Every 8 hours      05/02/19 1500     IP VTE HIGH RISK PATIENT  Once      04/30/19 1750     Place CRZU hose  Until discontinued      04/30/19 1515        Dispo: pending clinical improvement with plans to follow up in clinic       Jere Carrasco MD  Department of Hospital Medicine   [facility]

## 2019-05-07 NOTE — PLAN OF CARE
Problem: Adult Inpatient Plan of Care  Goal: Plan of Care Review  Outcome: Ongoing (interventions implemented as appropriate)  Pt free of falls and injury during shift. POC reviewed with pt. VS stable. No orders for telemetry. NS @75 ml/hr infusing.  No acute events noted at this time. No complaints. Educated pt why they are at risk for falls and to use call light for assistance ambulating. Yellow non-slip socks on pt. Bed low and locked, call light with in reach.  at bedside. Will continue to monitor.

## 2019-05-07 NOTE — ASSESSMENT & PLAN NOTE
Previously iron deficient now likely complicated by unknown malignancy,.    - On Iron replacement  - Monitor CBC, transfuse PRBC > 7

## 2019-05-07 NOTE — ASSESSMENT & PLAN NOTE
Complicated by COPD resolving with diuresis however now complicated by TLS.  Will increase lasix to BID 5/07  - CTPA negative for PE, ground glass opacities found ,   - s/p transfusion 1prbc yesterday with Lasix   -Lasix 40mg QD for now for underlying HF  - Improving

## 2019-05-07 NOTE — PLAN OF CARE
Problem: Adult Inpatient Plan of Care  Goal: Plan of Care Review  Outcome: Ongoing (interventions implemented as appropriate)  Plan of care discussed with patient and spouse. Patient is free of fall/injury. Denies chest pain and SOB w/nasal cannula. Pain treated with PRN tylenol. Reinforced I/O monitoring. NaCl discontinued at 0900 and restarted at 1500. Allopurinol adjusted. Waiting on biopsy results. All questions addressed; will continue to monitor.

## 2019-05-07 NOTE — ASSESSMENT & PLAN NOTE
Nephrology on board appreciate assistance.  Labs suggestive of TLS with unknown malignancy currently be worked up by Oncology with bone marrow biopsy pending.  - gentle hydration with 75 CC and lasix 40 BID   - Allopurinol renally dosed   - BMP BID

## 2019-05-08 VITALS
HEIGHT: 63 IN | TEMPERATURE: 98 F | BODY MASS INDEX: 29.64 KG/M2 | RESPIRATION RATE: 16 BRPM | OXYGEN SATURATION: 92 % | WEIGHT: 167.31 LBS | SYSTOLIC BLOOD PRESSURE: 156 MMHG | DIASTOLIC BLOOD PRESSURE: 85 MMHG | HEART RATE: 79 BPM

## 2019-05-08 LAB
ANION GAP SERPL CALC-SCNC: 5 MMOL/L (ref 8–16)
BASOPHILS # BLD AUTO: 0 K/UL (ref 0–0.2)
BASOPHILS NFR BLD: 0 % (ref 0–1.9)
BUN SERPL-MCNC: 40 MG/DL (ref 8–23)
CALCIUM SERPL-MCNC: 8.3 MG/DL (ref 8.7–10.5)
CHLORIDE SERPL-SCNC: 105 MMOL/L (ref 95–110)
CO2 SERPL-SCNC: 25 MMOL/L (ref 23–29)
CREAT SERPL-MCNC: 1.4 MG/DL (ref 0.5–1.4)
DIFFERENTIAL METHOD: ABNORMAL
EOSINOPHIL # BLD AUTO: 0.1 K/UL (ref 0–0.5)
EOSINOPHIL NFR BLD: 1.4 % (ref 0–8)
ERYTHROCYTE [DISTWIDTH] IN BLOOD BY AUTOMATED COUNT: 19.2 % (ref 11.5–14.5)
EST. GFR  (AFRICAN AMERICAN): 43.9 ML/MIN/1.73 M^2
EST. GFR  (NON AFRICAN AMERICAN): 38.1 ML/MIN/1.73 M^2
GLUCOSE SERPL-MCNC: 93 MG/DL (ref 70–110)
HCT VFR BLD AUTO: 25.6 % (ref 37–48.5)
HGB BLD-MCNC: 7.8 G/DL (ref 12–16)
IMM GRANULOCYTES # BLD AUTO: 0.01 K/UL (ref 0–0.04)
IMM GRANULOCYTES NFR BLD AUTO: 0.3 % (ref 0–0.5)
INTERPRETATION UR IFE-IMP: NORMAL
LYMPHOCYTES # BLD AUTO: 0.8 K/UL (ref 1–4.8)
LYMPHOCYTES NFR BLD: 23.8 % (ref 18–48)
MAGNESIUM SERPL-MCNC: 2.1 MG/DL (ref 1.6–2.6)
MCH RBC QN AUTO: 24.6 PG (ref 27–31)
MCHC RBC AUTO-ENTMCNC: 30.5 G/DL (ref 32–36)
MCV RBC AUTO: 81 FL (ref 82–98)
MONOCYTES # BLD AUTO: 0.5 K/UL (ref 0.3–1)
MONOCYTES NFR BLD: 15 % (ref 4–15)
NEUTROPHILS # BLD AUTO: 2.1 K/UL (ref 1.8–7.7)
NEUTROPHILS NFR BLD: 59.5 % (ref 38–73)
NRBC BLD-RTO: 0 /100 WBC
PATHOLOGIST INTERPRETATION UIFE: NORMAL
PHOSPHATE SERPL-MCNC: 2.5 MG/DL (ref 2.7–4.5)
PLATELET # BLD AUTO: 167 K/UL (ref 150–350)
PMV BLD AUTO: 11.2 FL (ref 9.2–12.9)
POCT GLUCOSE: 83 MG/DL (ref 70–110)
POCT GLUCOSE: 90 MG/DL (ref 70–110)
POTASSIUM SERPL-SCNC: 3.7 MMOL/L (ref 3.5–5.1)
RBC # BLD AUTO: 3.17 M/UL (ref 4–5.4)
SODIUM SERPL-SCNC: 135 MMOL/L (ref 136–145)
URATE SERPL-MCNC: 8.2 MG/DL (ref 2.4–5.7)
WBC # BLD AUTO: 3.53 K/UL (ref 3.9–12.7)

## 2019-05-08 PROCEDURE — 63600175 PHARM REV CODE 636 W HCPCS: Mod: HCNC | Performed by: STUDENT IN AN ORGANIZED HEALTH CARE EDUCATION/TRAINING PROGRAM

## 2019-05-08 PROCEDURE — 36415 COLL VENOUS BLD VENIPUNCTURE: CPT | Mod: HCNC

## 2019-05-08 PROCEDURE — 25000003 PHARM REV CODE 250: Mod: HCNC | Performed by: STUDENT IN AN ORGANIZED HEALTH CARE EDUCATION/TRAINING PROGRAM

## 2019-05-08 PROCEDURE — 99238 PR HOSPITAL DISCHARGE DAY,<30 MIN: ICD-10-PCS | Mod: HCNC,GC,, | Performed by: HOSPITALIST

## 2019-05-08 PROCEDURE — 84550 ASSAY OF BLOOD/URIC ACID: CPT | Mod: HCNC

## 2019-05-08 PROCEDURE — 85025 COMPLETE CBC W/AUTO DIFF WBC: CPT | Mod: HCNC

## 2019-05-08 PROCEDURE — 99238 HOSP IP/OBS DSCHRG MGMT 30/<: CPT | Mod: HCNC,GC,, | Performed by: HOSPITALIST

## 2019-05-08 PROCEDURE — 83735 ASSAY OF MAGNESIUM: CPT | Mod: HCNC

## 2019-05-08 PROCEDURE — 84100 ASSAY OF PHOSPHORUS: CPT | Mod: HCNC

## 2019-05-08 PROCEDURE — 80048 BASIC METABOLIC PNL TOTAL CA: CPT | Mod: HCNC

## 2019-05-08 RX ORDER — FUROSEMIDE 20 MG/1
20 TABLET ORAL DAILY
Qty: 30 TABLET | Refills: 0 | Status: SHIPPED | OUTPATIENT
Start: 2019-05-08 | End: 2019-05-21

## 2019-05-08 RX ORDER — POTASSIUM CHLORIDE 20 MEQ/1
40 TABLET, EXTENDED RELEASE ORAL ONCE
Status: COMPLETED | OUTPATIENT
Start: 2019-05-08 | End: 2019-05-08

## 2019-05-08 RX ORDER — ALLOPURINOL 300 MG/1
300 TABLET ORAL DAILY
Qty: 30 TABLET | Refills: 1 | Status: SHIPPED | OUTPATIENT
Start: 2019-05-09 | End: 2020-02-10

## 2019-05-08 RX ORDER — LOSARTAN POTASSIUM AND HYDROCHLOROTHIAZIDE 25; 100 MG/1; MG/1
1 TABLET ORAL DAILY
Qty: 90 TABLET | Refills: 3
Start: 2019-05-08 | End: 2019-05-21

## 2019-05-08 RX ADMIN — LIDOCAINE 1 PATCH: 50 PATCH TOPICAL at 02:05

## 2019-05-08 RX ADMIN — ASPIRIN 81 MG CHEWABLE TABLET 81 MG: 81 TABLET CHEWABLE at 08:05

## 2019-05-08 RX ADMIN — SERTRALINE HYDROCHLORIDE 50 MG: 50 TABLET ORAL at 08:05

## 2019-05-08 RX ADMIN — SODIUM CHLORIDE: 0.9 INJECTION, SOLUTION INTRAVENOUS at 06:05

## 2019-05-08 RX ADMIN — FUROSEMIDE 40 MG: 10 INJECTION, SOLUTION INTRAMUSCULAR; INTRAVENOUS at 08:05

## 2019-05-08 RX ADMIN — HEPARIN SODIUM 5000 UNITS: 5000 INJECTION, SOLUTION INTRAVENOUS; SUBCUTANEOUS at 02:05

## 2019-05-08 RX ADMIN — HEPARIN SODIUM 5000 UNITS: 5000 INJECTION, SOLUTION INTRAVENOUS; SUBCUTANEOUS at 05:05

## 2019-05-08 RX ADMIN — GABAPENTIN 300 MG: 300 CAPSULE ORAL at 08:05

## 2019-05-08 RX ADMIN — ALLOPURINOL 300 MG: 300 TABLET ORAL at 08:05

## 2019-05-08 RX ADMIN — SEVELAMER CARBONATE 800 MG: 800 TABLET, FILM COATED ORAL at 08:05

## 2019-05-08 RX ADMIN — SEVELAMER CARBONATE 800 MG: 800 TABLET, FILM COATED ORAL at 12:05

## 2019-05-08 RX ADMIN — PANTOPRAZOLE SODIUM 40 MG: 40 TABLET, DELAYED RELEASE ORAL at 08:05

## 2019-05-08 RX ADMIN — POLYETHYLENE GLYCOL 3350 17 G: 17 POWDER, FOR SOLUTION ORAL at 08:05

## 2019-05-08 RX ADMIN — POTASSIUM CHLORIDE 40 MEQ: 1500 TABLET, EXTENDED RELEASE ORAL at 05:05

## 2019-05-08 NOTE — DISCHARGE SUMMARY
[facility] Hospital Medicine  Discharge Summary      Patient Name: Shelby Thompson  MRN: [id number]  Admission Date: 4/30/2019  Hospital Length of Stay: 8 days  Discharge Date and Time:  05/08/2019 5:28 PM  Attending Physician: No att. providers found   Discharging Provider: Srikanth Olvera MD  Primary Care Provider: Emanuel Arevalo MD  [facility] Medicine Team: [facility] 5 Srikanth Olvera MD    HPI:   Ms. 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    She was seen by her PCP Dr. Arevalo this morning, who recorded an SaO2 of 87% and sent her to the ED for evaluation of PE. She is also reporting left flank and chest pain. She states that the pain feels like it's under her breast and wraps around her side to her back. She gets relief with tramadol and by sitting on the edge of her recliner chair at home. The pain is constant, but does not radiate. Palpation makes the pain worse. She denies any nausea, vomiting, diaphoresis or worsening SOB with increases in her pain. Patient reports that she used to be able to get around without an issue, but now gets so short of breath going from one room to another that she has to sit down.     Of note, she has a hx of CVA (residual deficits of weakness and tremor on right side). She uses a rollator to get around, and there is no acute change with this. Patient's  states that the PCP was also concerned about her recent CT scan and wanted her to be admitted for continued work up of her abnormal CT scan. Patient denies any issues with bleeding, bruising, or LE edema.    6 months ago she had a normal  mammogram. Will she had a EGD back in 2016 which showed a nonbleeding angio ectasia.  A colonoscopy back in March 2015 showing a 5 mm hyperplastic polyp     Liver u/s 04/04/2019  Innumerable new hepatic lesions concerning for metastatic disease.  Further evaluation with contrast-enhanced CT is recommended.  1.8 cm peripancreatic lymph node.    CT Abdomen/Pelvis 4/10/2019  Innumerable indeterminate enhancing lesions identified throughout the liver.  No definitive washout on delayed phases.  Findings are indeterminate by CT criteria, metastatic disease is not excluded.  Recommend further evaluation with MRI liver mass protocol.  Enhancing retroperitoneal soft tissue lesion, likely an enlarged retroperitoneal lymph node.  Findings are concerning for metastasis in the setting of occult malignancy.  Thickened endometrium, underlying neoplasm cannot.  Recommend further evaluation with pelvic ultrasound or MRI.  Indeterminate ill-defined hypoattenuating lesion in hepatic segment VI.  Indeterminate arterial enhancing lesion in the spleen.      She was evaluated by hepatology 3 years ago for mass found on abdominal CT. Per chart review it was a f/u for evaluation of elevated liver enzymes. She has undergone an extensive workup for her elevated liver enzymes. Serological workup was negative for Gino's, alpha-1 antitrypsin deficiency, hemochromatosis, autoimmune etiology, and viral hepatitis. Thyroid was normal. She had a CT scan to further evaluate her liver lesion seen on u/s. TPCT showed a focal 1.2-cm area of hypodensity identified within hepatic segment VI, which is not isodense on delayed phase imaging. Due its small size and imaging characteristics, it cannot be classified as a hemangioma and further follow-up, if clinically warranted, could be obtained with MRI in 3 to 6 months to document stability. Her AFP, CEA, CA 19-9, and  were all normal. She underwent u/s guided liver biopsy on 4/6/15 that revealed no  "significant abnormality to explain her elevated transaminases.  FINAL PATHOLOGIC DIAGNOSIS  LIVER (NEEDLE BIOPSY)  -Very minimal lobular disarray/injury  -Rare macrosteatosis, less than 2%  -No iron  -No fibrosis  Supplemental Diagnosis  This addendum is issued for the results of ancillary studies.  PASD is negative. [place] red stain is negative for amyloid.  Keratin 7 is positive in bile ducts with no alterations (normal pattern staining).  All immunostain and special stain controls reacted appropriately.    * No surgery found *      Hospital Course:   She was managed for HFpEF with diuresis and transitioned to room air. Her course was complicated by worsening MARTA, not improving on diuresis, nephrology consulted.   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 H/O was consulted for labs suggestive of MM, Bone marrow Bx suggestive 70-80% plasma cells. She required 1U of PRBC transfusion for Hb <7. MARTA has improved and symptomatically better, will discharge michaela home. She will follow up with Hem/Onc on 5/8/19, take allopurinol 300 mg. She will also follow up with Dr Mickey jennings PCP, Losartan-HCTZ held for MARTA and Hyperuricemia. Due to her CHF she will be sent home with Lasix 20 mg OD and adjusted accordingly. To resume rest of her home medications, activity as tolerated and regular diet.        Review of Systems   Constitutional: Negative for chills and fever.   Respiratory: Negative for shortness of breath and wheezing.    Cardiovascular: Negative for chest pain and palpitations.   Gastrointestinal: Negative for abdominal distention, abdominal pain, diarrhea, nausea and vomiting.   Genitourinary: Negative for dysuria.      Objective:      BP (!) 156/85 (BP Location: Left arm, Patient Position: Sitting)   Pulse 79   Temp 98.4 °F (36.9 °C) (Oral)   Resp 16   Ht 5' 3" (1.6 m)   Wt 75.9 kg (167 lb 5.3 oz)   LMP  (LMP Unknown)   SpO2 (!) " 92%   Breastfeeding? No   BMI 29.64 kg/m²     Physical Exam   Constitutional: She is oriented to person, place, and time. She appears well-developed and well-nourished. No distress.   Dysarthria with hesitancy in speech noted- residual effect from previous CVA    HENT:   Head: Normocephalic and atraumatic.   Eyes: Pupils are equal, round, and reactive to light. EOM are normal.   Neck: Normal range of motion. Neck supple.   Cardiovascular: Normal rate, normal heart sounds and intact distal pulses.   No murmur heard.  Pulmonary/Chest: Effort normal. No respiratory distress. She has no rales.   Abdominal: Soft. Bowel sounds are normal. She exhibits no distension. There is no tenderness. There is no guarding.   Musculoskeletal: Normal range of motion. She exhibits edema.   Neurological: She is alert and oriented to person, place, and time.   Residual intention tremor of R hand from previous CVA   Skin: Skin is warm and dry. Capillary refill takes less than 2 seconds. She is not diaphoretic.   Psychiatric: She has a normal mood and affect. Her behavior is normal. Judgment and thought content normal.       Consults:   Consults (From admission, onward)        Status Ordering Provider     Inpatient consult to Hematology/Oncology  Once     Provider:  (Not yet assigned)    Completed SUMMER HERNANDEZ     Inpatient consult to Nephrology  Once     Provider:  (Not yet assigned)    SUMMER Caba            Final Active Diagnoses:    Diagnosis Date Noted POA    PRINCIPAL PROBLEM:  Acute respiratory failure with hypoxia [J96.01] 04/30/2019 Yes    Hyperglycemia [R73.9] 05/04/2019 Unknown    Gammopathy [D47.2] 05/02/2019 Unknown    FUENTES (acute kidney injury) [N17.9] 05/01/2019 Unknown    Acute on chronic diastolic heart failure [I50.33] 04/30/2019 Yes    Multiple lesions of metastatic malignancy [C79.9] 04/30/2019 Yes    PVC (premature ventricular contraction) [I49.3] 04/30/2019 Unknown    Acute anemia [D64.9] 04/30/2019  Unknown    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) [I69.322] 02/26/2018 Not Applicable    Statin intolerance [Z78.9] 07/17/2017 Yes    Essential hypertension [I10] 02/13/2017 Yes    Tremor [R25.1] 05/07/2014 Yes    Hyperlipemia [E78.5] 10/14/2013 Yes    Iron deficiency anemia [D50.9] 08/10/2012 Yes      Problems Resolved During this Admission:       Discharged Condition: fair    Disposition: Home or Self Care    Follow Up:  Follow-up Information     Emanuel Arevalo MD.    Specialty:  Internal Medicine  Why:  Appt scheduled for 5/21/19 @ 10AM  Contact information:  [address]  [facility] [id number] [phone]                 Patient Instructions:   No discharge procedures on file.    Significant Diagnostic Studies: Labs:   CMP   Recent Labs   Lab 05/07/19  0404 05/07/19  1811 05/08/19  0828    135* 135*   K 3.7 3.5 3.7    105 105   CO2 23 22* 25   GLU 87 87 93   BUN 43* 43* 40*   CREATININE 1.3 1.5* 1.4   CALCIUM 8.0* 8.2* 8.3*   ANIONGAP 6* 8 5*   ESTGFRAFRICA 48.0* 40.4* 43.9*   EGFRNONAA 41.7* 35.0* 38.1*    and CBC   Recent Labs   Lab 05/07/19  0404 05/08/19  0519   WBC 3.31* 3.53*   HGB 7.1* 7.8*   HCT 23.3* 25.6*    167       Pending Diagnostic Studies:     Procedure Component Value Units Date/Time    Basic metabolic panel [[id number]]     Order Status:  Sent Lab Status:  No result     Specimen:  Blood          Medications:  Reconciled Home Medications:      Medication List      START taking these medications    allopurinol 300 MG tablet  Commonly known as:  ZYLOPRIM  Take 1 tablet (300 mg total) by mouth once daily.  Start taking on:  5/9/2019     furosemide 20 MG tablet  Commonly known as:  LASIX  Take 1 tablet (20 mg total) by mouth once daily. Take for 1 week. And ask PCP/Oncolgy before continuing        CHANGE how you take these medications    alirocumab 75 mg/mL Pnij  Commonly known as:  PRALUENT PEN  Inject 1 mL (75 mg total) into the skin every 14  (fourteen) days. Ask your PCP/ Oncologist  What changed:  additional instructions     losartan-hydrochlorothiazide 100-25 mg 100-25 mg per tablet  Commonly known as:  HYZAAR  Take 1 tablet by mouth once daily. Hold until continued by PCP/Oncologist  What changed:  additional instructions        CONTINUE taking these medications    acetaminophen 650 MG Tbsr  Commonly known as:  TYLENOL  Take 1,300 mg by mouth daily as needed (pain).     amLODIPine 5 MG tablet  Commonly known as:  NORVASC  TAKE 1 TABLET BY MOUTH EVERY DAY     aspirin 81 mg Tab  Take 1 tablet by mouth Daily.     bisacodyl 5 mg EC tablet  Commonly known as:  DULCOLAX  Take 5 mg by mouth once daily.     ferrous gluconate 324 mg (37.5 mg iron) Tab  TAKE 1 TABLET BY MOUTH 2 (TWO) TIMES DAILY WITH MEALS.     gabapentin 300 MG capsule  Commonly known as:  NEURONTIN  TAKE 1 CAPSULE (300 MG TOTAL) BY MOUTH 2 (TWO) TIMES DAILY.     omeprazole 40 MG capsule  Commonly known as:  PRILOSEC  TAKE 1 CAPSULE (40 MG TOTAL) BY MOUTH ONCE DAILY.     sertraline 50 MG tablet  Commonly known as:  ZOLOFT  TAKE 1 TABLET (50 MG TOTAL) BY MOUTH ONCE DAILY.     traMADol 50 mg tablet  Commonly known as:  ULTRAM  Take 1 tablet (50 mg total) by mouth 2 (two) times daily as needed for Pain.     vitamin D 1000 units Tab  Commonly known as:  VITAMIN D3  Take 185 mg by mouth once daily.            Indwelling Lines/Drains at time of discharge:   Lines/Drains/Airways          None            Patient was seen and examined on the date of discharge and determined to be suitable for discharge.         Srikanth Olvera MD  Department of Hospital Medicine  [facility]

## 2019-05-08 NOTE — PLAN OF CARE
Problem: Adult Inpatient Plan of Care  Goal: Plan of Care Review  Outcome: Ongoing (interventions implemented as appropriate)  Patient remained free from falls/injury/trauma throughout shift. No complaints of chest pain or SOB. VSS. IV fluids continuing to infuse at prescribed rate. Fall risk precautions reviewed and maintained. Plan of care reviewed with patient. Patient verbalized understanding. All questions encouraged and answered. Will continue to monitor.

## 2019-05-08 NOTE — PROGRESS NOTES
Home Oxygen Evaluation    Date Performed: 2019    1) Patient's Home O2 Sat on room air, while at rest: 95%        If O2 sats on room air at rest are 88% or below, patient qualifies. No additional testing needed. Document N/A in steps 2 and 3. If 89% or above, complete steps 2.      2) Patient's O2 Sat on room air while exercisin%        If O2 sats on room air while exercising remain 89% or above patient does not qualify, no further testing needed Document N/A in step 3. If O2 sats on room air while exercising are 88% or below, continue to step 3.      3) N/A         (Must show improvement from #2 for patients to qualify)    If O2 sats improve on oxygen, patient qualifies for portable oxygen. If not, the patient does not qualify.

## 2019-05-08 NOTE — CLINICAL REVIEW
Clinical Review    Bone marrow biopsy reveals 70-80% plasma cells. PCPD FISH and chromosome analysis are still pending. Labs reveal a hgb of 7.8 and Creatinine which improved to 1.4. Uric acid is continuing to improve, currently 8.2 today. She will be followed up tomorrow by me in Hematology clinic. Please discharge patient on 300 mg of allopurinol and we can make any additional dose adjustments as needed. Pt is stable for discharge from our standpoint.     Genny Napoles MD  Hematology/Oncology Fellow, PGY IV  [facility]  
Clinical Review    Discussed with primary team. Pt seems to have clinically improved. Can increase allopurinol to 300 mg daily and up to two times daily in order to help with hyperuricemia, trending down but currently is 9.8 from 11.8 four days ago. Continue hydration with IV fluids as well. Renal function is improving as well, with creatinine of 1.5 today. The pt has received her 4th dose of dexamethasone today.     Sent message to our schedulers regarding follow-up of this patient in our clinic later this week, given plans for possible discharge soon from the hospital.     Genny Napoles MD  Hematology/Oncology Fellow, PGY IV  [facility]  
Clinical Review    Pathologist Interpretation SPE REVIEWED    Comment: Electronically reviewed and signed by:   Sreekanth Syed M.D.   Signed on 05/03/19 at 13:25   Increased total protein.   Normal gamma globulins are decreased.   There is a paraprotein band in gamma measuring 6.75 g/dL.   Correlate with NATALY.      Pathologist Interpretation NATALY REVIEWED    Comment: Electronically reviewed and signed by:   Sreekanth Syed M.D.   Signed on 05/03/19 at 13:25   IgG kappa specific monoclonal band in gamma.      Pt with suspected IgG kappa multiple myeloma with M-spike of 6.75. She also met some  traditional criteria for disease as well with anemia (Hgb of 7.1), corrceted calcium of 10.6, renal dysfunction with Cr of 2.7, and new T7 verterbral fracture. Beta 2 microglobulin was elevated at 17.5 and albumin was 2.5.    Recommendations:  Will await further bone marrow studies completed today in evaluation of Multiple Myeloma.  Will continue to monitor renal function over weekend.  Communicated starting dexamethasone 40 mg today with primary team, for four days to see if this helps control burden of myeloma and helps with renal function as well.    Discussed with attending, Blas.    Genny Napoles MD  Hematology/Oncology Fellow, PGY IV  [facility]  
Clinical Review    Reviewed chart, pt has received three doses of decadron so far and renal function continuing to steadily improve. Her creatinine is 1.7 today.  Will look out for bone marrow final pathology throughout next few days.    Genny Napoles MD  Hematology/Oncology Fellow, PGY IV  [facility]  
yes
yes

## 2019-05-08 NOTE — PLAN OF CARE
Problem: Adult Inpatient Plan of Care  Goal: Plan of Care Review  Outcome: Ongoing (interventions implemented as appropriate)  Plan of care discussed with patient and spouse. Patient is free of fall/injury/pain. Denies chest pain and SOB. NaCl continued. 6 minute walk test w/minimum SpO2 of 91%. Waiting on biopsy results. All questions addressed; will continue to monitor.

## 2019-05-08 NOTE — NURSING
Patient is ready for discharge. Patient stable alert and oriented. IVs removed. No complaints of pain. Medications delivered to bedside. Discussed discharge plan with patient and spouse. Reviewed medications and side effects, hem/onc appointment tomorrow, and answered questions.

## 2019-05-09 ENCOUNTER — OFFICE VISIT (OUTPATIENT)
Dept: HEMATOLOGY/ONCOLOGY | Facility: CLINIC | Age: 69
End: 2019-05-09
Payer: MEDICARE

## 2019-05-09 ENCOUNTER — LAB VISIT (OUTPATIENT)
Dept: LAB | Facility: HOSPITAL | Age: 69
End: 2019-05-09
Attending: INTERNAL MEDICINE
Payer: MEDICARE

## 2019-05-09 ENCOUNTER — PATIENT OUTREACH (OUTPATIENT)
Dept: ADMINISTRATIVE | Facility: CLINIC | Age: 69
End: 2019-05-09

## 2019-05-09 ENCOUNTER — TELEPHONE (OUTPATIENT)
Dept: PHARMACY | Facility: CLINIC | Age: 69
End: 2019-05-09

## 2019-05-09 VITALS
HEART RATE: 80 BPM | DIASTOLIC BLOOD PRESSURE: 86 MMHG | BODY MASS INDEX: 29.26 KG/M2 | HEIGHT: 63 IN | OXYGEN SATURATION: 94 % | SYSTOLIC BLOOD PRESSURE: 155 MMHG | WEIGHT: 165.13 LBS | TEMPERATURE: 99 F

## 2019-05-09 DIAGNOSIS — C90.00 METASTATIC MULTIPLE MYELOMA TO BONE: ICD-10-CM

## 2019-05-09 DIAGNOSIS — D53.9 NUTRITIONAL ANEMIA: ICD-10-CM

## 2019-05-09 DIAGNOSIS — Z51.11 ENCOUNTER FOR CHEMOTHERAPY MANAGEMENT: ICD-10-CM

## 2019-05-09 DIAGNOSIS — N18.30 CHRONIC KIDNEY DISEASE, STAGE III (MODERATE): ICD-10-CM

## 2019-05-09 DIAGNOSIS — C90.00 MULTIPLE MYELOMA NOT HAVING ACHIEVED REMISSION: ICD-10-CM

## 2019-05-09 DIAGNOSIS — C90.00 MULTIPLE MYELOMA NOT HAVING ACHIEVED REMISSION: Primary | ICD-10-CM

## 2019-05-09 LAB
ALBUMIN SERPL BCP-MCNC: 3 G/DL (ref 3.5–5.2)
ALP SERPL-CCNC: 52 U/L (ref 55–135)
ALT SERPL W/O P-5'-P-CCNC: 19 U/L (ref 10–44)
ANION GAP SERPL CALC-SCNC: 5 MMOL/L (ref 8–16)
AST SERPL-CCNC: 13 U/L (ref 10–40)
BASOPHILS # BLD AUTO: 0 K/UL (ref 0–0.2)
BASOPHILS NFR BLD: 0 % (ref 0–1.9)
BILIRUB SERPL-MCNC: 0.5 MG/DL (ref 0.1–1)
BUN SERPL-MCNC: 32 MG/DL (ref 8–23)
CALCIUM SERPL-MCNC: 8.8 MG/DL (ref 8.7–10.5)
CHLORIDE SERPL-SCNC: 108 MMOL/L (ref 95–110)
CO2 SERPL-SCNC: 25 MMOL/L (ref 23–29)
CREAT SERPL-MCNC: 1.3 MG/DL (ref 0.5–1.4)
DIFFERENTIAL METHOD: ABNORMAL
EOSINOPHIL # BLD AUTO: 0.1 K/UL (ref 0–0.5)
EOSINOPHIL NFR BLD: 2.3 % (ref 0–8)
ERYTHROCYTE [DISTWIDTH] IN BLOOD BY AUTOMATED COUNT: 19.4 % (ref 11.5–14.5)
EST. GFR  (AFRICAN AMERICAN): 48 ML/MIN/1.73 M^2
EST. GFR  (NON AFRICAN AMERICAN): 41.7 ML/MIN/1.73 M^2
GLUCOSE SERPL-MCNC: 88 MG/DL (ref 70–110)
HCT VFR BLD AUTO: 30.1 % (ref 37–48.5)
HGB BLD-MCNC: 9.1 G/DL (ref 12–16)
IMM GRANULOCYTES # BLD AUTO: 0.01 K/UL (ref 0–0.04)
IMM GRANULOCYTES NFR BLD AUTO: 0.3 % (ref 0–0.5)
LYMPHOCYTES # BLD AUTO: 0.9 K/UL (ref 1–4.8)
LYMPHOCYTES NFR BLD: 23.1 % (ref 18–48)
MCH RBC QN AUTO: 24.8 PG (ref 27–31)
MCHC RBC AUTO-ENTMCNC: 30.2 G/DL (ref 32–36)
MCV RBC AUTO: 82 FL (ref 82–98)
MONOCYTES # BLD AUTO: 0.4 K/UL (ref 0.3–1)
MONOCYTES NFR BLD: 11.2 % (ref 4–15)
NEUTROPHILS # BLD AUTO: 2.5 K/UL (ref 1.8–7.7)
NEUTROPHILS NFR BLD: 63.1 % (ref 38–73)
NRBC BLD-RTO: 0 /100 WBC
PLATELET # BLD AUTO: 220 K/UL (ref 150–350)
PMV BLD AUTO: 10.8 FL (ref 9.2–12.9)
POTASSIUM SERPL-SCNC: 3.8 MMOL/L (ref 3.5–5.1)
PROT SERPL-MCNC: 13.2 G/DL (ref 6–8.4)
RBC # BLD AUTO: 3.67 M/UL (ref 4–5.4)
SODIUM SERPL-SCNC: 138 MMOL/L (ref 136–145)
URATE SERPL-MCNC: 6.9 MG/DL (ref 2.4–5.7)
WBC # BLD AUTO: 3.94 K/UL (ref 3.9–12.7)

## 2019-05-09 PROCEDURE — 1101F PR PT FALLS ASSESS DOC 0-1 FALLS W/OUT INJ PAST YR: ICD-10-PCS | Mod: HCNC,CPTII,GC,S$GLB | Performed by: STUDENT IN AN ORGANIZED HEALTH CARE EDUCATION/TRAINING PROGRAM

## 2019-05-09 PROCEDURE — 99215 PR OFFICE/OUTPT VISIT, EST, LEVL V, 40-54 MIN: ICD-10-PCS | Mod: HCNC,GC,S$GLB, | Performed by: STUDENT IN AN ORGANIZED HEALTH CARE EDUCATION/TRAINING PROGRAM

## 2019-05-09 PROCEDURE — 84550 ASSAY OF BLOOD/URIC ACID: CPT | Mod: HCNC

## 2019-05-09 PROCEDURE — 1101F PT FALLS ASSESS-DOCD LE1/YR: CPT | Mod: HCNC,CPTII,GC,S$GLB | Performed by: STUDENT IN AN ORGANIZED HEALTH CARE EDUCATION/TRAINING PROGRAM

## 2019-05-09 PROCEDURE — 99215 OFFICE O/P EST HI 40 MIN: CPT | Mod: HCNC,GC,S$GLB, | Performed by: STUDENT IN AN ORGANIZED HEALTH CARE EDUCATION/TRAINING PROGRAM

## 2019-05-09 PROCEDURE — 3079F DIAST BP 80-89 MM HG: CPT | Mod: HCNC,CPTII,GC,S$GLB | Performed by: STUDENT IN AN ORGANIZED HEALTH CARE EDUCATION/TRAINING PROGRAM

## 2019-05-09 PROCEDURE — 3077F SYST BP >= 140 MM HG: CPT | Mod: HCNC,CPTII,GC,S$GLB | Performed by: STUDENT IN AN ORGANIZED HEALTH CARE EDUCATION/TRAINING PROGRAM

## 2019-05-09 PROCEDURE — 80053 COMPREHEN METABOLIC PANEL: CPT | Mod: HCNC

## 2019-05-09 PROCEDURE — 99999 PR PBB SHADOW E&M-EST. PATIENT-LVL III: CPT | Mod: PBBFAC,HCNC,GC, | Performed by: STUDENT IN AN ORGANIZED HEALTH CARE EDUCATION/TRAINING PROGRAM

## 2019-05-09 PROCEDURE — 85025 COMPLETE CBC W/AUTO DIFF WBC: CPT | Mod: HCNC

## 2019-05-09 PROCEDURE — 3077F PR MOST RECENT SYSTOLIC BLOOD PRESSURE >= 140 MM HG: ICD-10-PCS | Mod: HCNC,CPTII,GC,S$GLB | Performed by: STUDENT IN AN ORGANIZED HEALTH CARE EDUCATION/TRAINING PROGRAM

## 2019-05-09 PROCEDURE — 99999 PR PBB SHADOW E&M-EST. PATIENT-LVL III: ICD-10-PCS | Mod: PBBFAC,HCNC,GC, | Performed by: STUDENT IN AN ORGANIZED HEALTH CARE EDUCATION/TRAINING PROGRAM

## 2019-05-09 PROCEDURE — 3079F PR MOST RECENT DIASTOLIC BLOOD PRESSURE 80-89 MM HG: ICD-10-PCS | Mod: HCNC,CPTII,GC,S$GLB | Performed by: STUDENT IN AN ORGANIZED HEALTH CARE EDUCATION/TRAINING PROGRAM

## 2019-05-09 RX ORDER — ONDANSETRON 4 MG/1
4 TABLET, FILM COATED ORAL EVERY 6 HOURS PRN
Qty: 30 TABLET | Refills: 1 | Status: SHIPPED | OUTPATIENT
Start: 2019-05-09 | End: 2020-04-29 | Stop reason: SDUPTHER

## 2019-05-09 RX ORDER — LENALIDOMIDE 10 MG/1
10 CAPSULE ORAL DAILY
Qty: 21 EACH | Refills: 0 | Status: SHIPPED | OUTPATIENT
Start: 2019-05-09 | End: 2019-05-13 | Stop reason: SDUPTHER

## 2019-05-09 RX ORDER — ONDANSETRON 8 MG/1
8 TABLET, ORALLY DISINTEGRATING ORAL EVERY 8 HOURS PRN
Qty: 21 TABLET | Refills: 0 | Status: SHIPPED | OUTPATIENT
Start: 2019-05-09 | End: 2019-08-22 | Stop reason: SDUPTHER

## 2019-05-09 NOTE — Clinical Note
See again in clinic next week with CBC, CMP, uric acid, folate, and H pylori IgG prior to visit. Requesting chair for weekly Velcade, starting next Thursday., thanksOrders placed for Mathieu w prior auth, can administer at next visit

## 2019-05-09 NOTE — TELEPHONE ENCOUNTER
Informed patient that [facility] received prescription for Revlimid and prior authorization is required.  [facility] will be back in touch once insurance determination is received.

## 2019-05-09 NOTE — PROGRESS NOTES
C3 nurse attempted to contact patient. The following occurred:   C3 nurse attempted to contact Shelby Thompson for a TCC post hospital discharge follow up call. The patient is unable to conduct the call @ this time. The patient requested a callback.    The patient has a scheduled HOSFU appointment with Emanuel Arevalo MD on 05/21/19 @ 1000. Message sent to Physician staff.

## 2019-05-09 NOTE — PLAN OF CARE
Patient discharged home to care of family.     05/09/19 0831   Final Note   Assessment Type Final Discharge Note   Anticipated Discharge Disposition Home   What phone number can be called within the next 1-3 days to see how you are doing after discharge?   ([phone])   Hospital Follow Up  Appt(s) scheduled? Yes   Discharge plans and expectations educations in teach back method with documentation complete? Yes   Right Care Referral Info   Post Acute Recommendation No Care

## 2019-05-09 NOTE — PHYSICIAN QUERY
PT Name: Shelby Thompson  MR #: [id number]    Physician Query Form - Pathology Findings Clarification     CDS/: Suze Jernigan RN CCDS             Contact information:[email or web address]  This form is a permanent document in the medical record.     Query Date: May 9, 2019      By submitting this query, we are merely seeking further clarification of documentation.  Please utilize your independent clinical judgment when addressing the question(s) below.      The medical record contains the following:     Findings Supporting Clinical Information Location in Medical Record     BONE MARROW, LEFT ILIAC CREST (ASPIRATE SMEAR, TOUCH PREPARATION, CLOT SECTION, AND  CORE BIOPSY):  -- HYPERCELLULAR MARROW (90%) WITH PLASMA CELL MYELOMA AND RESIDUAL TRILINEAGE  HEMATOPOIESIS.  -- NO EVIDENCE OF METASTATIC CARCINOMA.  -- DECREASED IRON STORAGE.  -- SEE COMMENT.   BONE MARROW, LEFT ILIAC CREST (ASPIRATE SMEAR, TOUCH PREPARATION, CLOT SECTION, AND  CORE BIOPSY):  -- HYPERCELLULAR MARROW (90%) WITH PLASMA CELL MYELOMA AND RESIDUAL TRILINEAGE  HEMATOPOIESIS.  -- NO EVIDENCE OF METASTATIC CARCINOMA.  -- DECREASED IRON STORAGE.  -- SEE COMMENT.   Path report 5/3 reported 5/7     Please document the clinical significance of the Pathologists findings of ____Hypercellular marrow (90%) with plasma cell myeloma and residual trilineage_________.    [X] I agree with the Pathology Findings   [   ] I do not agree with the Pathology Findings   [   ] Other/Clarification of Findings:   [   ] Clinically Insignificant   [  ] Clinically Undetermined       Please document in your progress notes daily for the duration of treatment until resolved and include in your discharge summary.

## 2019-05-09 NOTE — PATIENT INSTRUCTIONS

## 2019-05-10 PROBLEM — C90.00 METASTATIC MULTIPLE MYELOMA TO BONE: Status: ACTIVE | Noted: 2019-05-10

## 2019-05-10 LAB
CHROM BANDING METHOD: NORMAL
CHROMOSOME ANALYSIS BM ADDITIONAL INFORMATION: NORMAL
CHROMOSOME ANALYSIS BM RELEASED BY: NORMAL
CHROMOSOME ANALYSIS BM RESULT SUMMARY: NORMAL
CLINICAL CYTOGENETICIST REVIEW: NORMAL
KARYOTYP MAR: NORMAL
REASON FOR REFERRAL (NARRATIVE): NORMAL
REF LAB TEST METHOD: NORMAL
SPECIMEN SOURCE: NORMAL
SPECIMEN: NORMAL

## 2019-05-10 RX ORDER — ACYCLOVIR 400 MG/1
400 TABLET ORAL 2 TIMES DAILY
Qty: 120 TABLET | Refills: 1 | Status: SHIPPED | OUTPATIENT
Start: 2019-05-10 | End: 2019-06-20 | Stop reason: SDUPTHER

## 2019-05-10 NOTE — PROGRESS NOTES
PATIENT: Shelby Thompson  MRN: [id number]  DATE: 5/9/2019      Diagnosis:   1. Multiple myeloma not having achieved remission    2. Metastatic multiple myeloma to bone    3. 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 (2008 and 2011, w residual defecitis and weaknessin R side/tremor on R hand), L Breast cancer s/p lumpectomy and radiation for stage 0 DCIS( in 2011, did not do endocrine tx due to CVA at that time), PVcs, DMII (not on current meds, prior A1c of 7.4), HTN, neuropathy of bilateral hands, liver lesions (which are possibly vascular per recent MRI in 05/2019), and hx of iron defeciency anemia, statin intolerance (on a PCSK9 inihibitor, alirocumab or praluent), anxiety/derpression, new onset Heart Failure, who presents to the clinic for further management of her recently diagnosed multiple myeloma. The pt states that she had not known about heart failure until this past admission, when she was note to have an EF of 55% and grade II diastolic dysfunction w elevated pulm artery pressure of 41; she was admitted to the hospital on 4/30/19 - 5/8/19 for an exacerbation of her worsening dyspnea over the prior two to three weeks as well as intermittent memory loss. She was noted to have O2 sats of 87% in her PCP, Dr. Arevalo's office and intiially was sent to the hospital in the evaluation for a PE. A CTA chest which was completed did not reveal any thrombus in pulmonary arteries but did reveal b/l ground glass opacities. At the time of her admission, she complained about pain under L breast and also pain in her L flank. PAtient does not recall if she had lost weight over the past few months but does think that she lost some weight in this last admission. Since leaving the [facility], the patient has been more dependent on a rollator to move around. Also, the patient  has had worsening nausea at home without any vomiting.     The patient had anemia to Hgb of 7.1, corrected calcium of 10.6, decreased renal function with Cr of 2.7, and a new T7 veterbral fracture. She currently has mid/low back pain and L flank pain.  Kappa was noted to be 548, with a k/l ratio of 693.7. Bone marrow biopsy completed on 5/3 revealed a plasma cell population of 70-80% for this patient. She has a PET-CT scheduled for 5/15. Beta2 microlglobulin was noted to be 17.5 and albumin of 2.5. Also, the patient was found to have an IgG kappa monoclonal protein  THe M-protein was noted to be 6.75 g/dL. Urine protein quantification is still pending.  The pt has had numbness /tingling in her hands post CVA, for which she is on gabapentin 300 mg BID.  The patient also had renal failure initially which dropped to 1.4 yesterday; she is s/p four days of dexamethasone x 40 mg.       The patient had an EGD in 2016 which had revealed non-bleeding angioectasia and also a colonosopy in March 2015 which showed a 5 mm hyperplastic polyp. In addition, the patient has been on iron supplements, with ferrous gluconate 324 BID. During recent admission in 05/2019, The pt did receive 1 U of pRBC in the hospital. Her ferritin is 54; the pt has not required prior trasnsfusion and no history of IV iron use.  Also, the patient is still on allopurinol 300 mg for her suspected TLS noted in the hospital. Uric acid on her discharge was 8.2. Her Hgb was 7.8 on day of discharge for this paitnet.    IN the past, she had a referral to Hepatology for elevated liver enzymes and found to have liver lesions and had subsequent US guided liver biopsy in 2015, which did not reveal fibrosis and was otherwise normal in apparenace. The patient had negative serolologic testing for Gino's, alpha-1 antitrypsin defeciency, hemochromatosis, autoimmune hepatitis, hemochromatosis, and viral hepatiitis at that time. MRI abdomen/pelvis completed on 5/1/19 - pt  was found to have multiple intedeterminate hepatic lesions and two additional hepatic lesions from prior ultrasound were suggestive of vascular malformations (CT had revealed hepatic lesions measuring 1.8 cm in hepatic segment 5 and also 1.3 cm in hepatic segment 8). The pt had a splenic lesion, which was noted to be about 2 cm on CT abdomen/pelvis on 4/10/19. Also, the patient had a 1 cm soft tissue lesion anterior to aorta and inferior to 3rd portion of duodenum. She had  thickened endometrium noted on pelvic ultrasound, which was not thought to require any further evaluation given no sx at time, when pt was evaluated by OB/GYN Dr. Antonio.     For the heart failure, the ptis on lasix 20 mg daily for diuresis. A congo red stain was completed on BM biopsy and was negative.   She had COPD and quit smokling in 2011, after smoking about 1/2 pack a day for about 42 years. She does not have any regular use of inhalers and does not keep any at home. She is currently on baby aspirin at home. Pt is edentulous.    The patient is seen with her  and also her daughter, Jessica. Also, the patient has one sister with breast cancer and one sister with lung cancer; there is no hematologic malignancy in the family. She is a retired .           Past Medical History:   Past Medical History:   Diagnosis Date    Acute respiratory failure with hypoxia 4/30/2019    FUENETS (acute kidney injury) 5/1/2019    Allergy     Anemia     Aortic aneurysm     Breast cancer 10/2011    left breast Stage 0 DCIS    Chronic diastolic congestive heart failure 11/6/2015    Colon polyp     GERD (gastroesophageal reflux disease)     History of colonic polyps     HX: breast cancer     Hyperlipemia     Hypertension     ICH (intracerebral hemorrhage)     Major depressive disorder, single episode, mild 6/23/2016    Nuclear sclerosis 7/21/2014    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 7/21/2014    PAD  (peripheral artery disease) 11/6/2015    Stroke 4/2011       Past Surgical HIstory:   Past Surgical History:   Procedure Laterality Date    APPENDECTOMY      BIOPSY LIVER AND ULTRASOUND N/A 4/6/2015    Performed by [facility] at [facility] OR 2ND FLR    BREAST BIOPSY Left 10/2011    BREAST LUMPECTOMY Left 2011    DCIS    COLONOSCOPY      COLONOSCOPY N/A 3/26/2015    Performed by Wicho Woodard MD at [facility] ENDO (4TH FLR)    CYST REMOVAL      back    ESOPHAGOGASTRODUODENOSCOPY  07/2016    duodenal angioectasia    ESOPHAGOGASTRODUODENOSCOPY (EGD) N/A 7/27/2016    Performed by Malik Sanchez MD at [facility] ENDO (4TH FLR)    FOOT FRACTURE SURGERY  10/2012    right foot, with R hallux valgus repair    FRACTURE SURGERY Right     broken toe repiar    HEMORRHOID SURGERY      LIVER BIOPSY      TUBAL LIGATION      UPPER GASTROINTESTINAL ENDOSCOPY         Family History:   Family History   Problem Relation Age of Onset    No Known Problems Sister     Cancer Sister 63        Lung Cancer    No Known Problems Mother     Cancer Father     No Known Problems Brother     Hypertension Daughter     Fibroids Daughter         uterine    Hypertension Son     Breast cancer Sister 62    No Known Problems Brother     No Known Problems Maternal Aunt     No Known Problems Maternal Uncle     No Known Problems Paternal Aunt     No Known Problems Paternal Uncle     Hypertension Maternal Grandmother     No Known Problems Maternal Grandfather     No Known Problems Paternal Grandmother     No Known Problems Paternal Grandfather     Ovarian cancer Neg Hx     Colon cancer Neg Hx     Tremor Neg Hx     Amblyopia Neg Hx     Blindness Neg Hx     Cataracts Neg Hx     Diabetes Neg Hx     Glaucoma Neg Hx     Macular degeneration Neg Hx     Retinal detachment Neg Hx     Strabismus Neg Hx     Stroke Neg Hx     Thyroid disease Neg Hx     Esophageal cancer Neg Hx     Rectal cancer Neg Hx     Stomach cancer  Neg Hx     Ulcerative colitis Neg Hx     Crohn's disease Neg Hx     Irritable bowel syndrome Neg Hx     Celiac disease Neg Hx        Social History:  reports that she quit smoking about 8 years ago. Her smoking use included cigarettes. She has a 10.00 pack-year smoking history. She quit smokeless tobacco use about 8 years ago. She reports that she drinks alcohol. She reports that she does not use drugs.    Allergies:  Review of patient's allergies indicates:   Allergen Reactions    Pravastatin Other (See Comments)     Itching, elevated cpk, muscle aches       Medications:  Current Outpatient Medications   Medication Sig Dispense Refill    acetaminophen (TYLENOL) 650 MG TbSR Take 1,300 mg by mouth daily as needed (pain).      allopurinol (ZYLOPRIM) 300 MG tablet Take 1 tablet (300 mg total) by mouth once daily. 30 tablet 1    amLODIPine (NORVASC) 5 MG tablet TAKE 1 TABLET BY MOUTH EVERY DAY 90 tablet 3    aspirin 81 mg Tab Take 1 tablet by mouth Daily.      bisacodyl (DULCOLAX) 5 mg EC tablet Take 5 mg by mouth once daily.      ferrous gluconate 324 mg (37.5 mg iron) Tab TAKE 1 TABLET BY MOUTH 2 (TWO) TIMES DAILY WITH MEALS. 180 tablet 0    gabapentin (NEURONTIN) 300 MG capsule TAKE 1 CAPSULE (300 MG TOTAL) BY MOUTH 2 (TWO) TIMES DAILY. 180 capsule 3    omeprazole (PRILOSEC) 40 MG capsule TAKE 1 CAPSULE (40 MG TOTAL) BY MOUTH ONCE DAILY. 90 capsule 3    sertraline (ZOLOFT) 50 MG tablet TAKE 1 TABLET (50 MG TOTAL) BY MOUTH ONCE DAILY. 90 tablet 4    traMADol (ULTRAM) 50 mg tablet Take 1 tablet (50 mg total) by mouth 2 (two) times daily as needed for Pain. 60 tablet 3    vitamin D 1000 units Tab Take 185 mg by mouth once daily.      alirocumab (PRALUENT PEN) 75 mg/mL PnIj Inject 1 mL (75 mg total) into the skin every 14 (fourteen) days. Ask your PCP/ Oncologist 2 mL 11    furosemide (LASIX) 20 MG tablet Take 1 tablet (20 mg total) by mouth once daily. Take for 1 week. And ask PCP/Oncolgy before  "continuing 30 tablet 0    lenalidomide (REVLIMID) 10 mg Cap Take 1 capsule (10 mg total) by mouth once daily on days 1 to 21 then off for 7 days. 21 each 0    losartan-hydrochlorothiazide 100-25 mg (HYZAAR) 100-25 mg per tablet Take 1 tablet by mouth once daily. Hold until continued by PCP/Oncologist 90 tablet 3    ondansetron (ZOFRAN) 4 MG tablet Take 1 tablet (4 mg total) by mouth every 6 (six) hours as needed for Nausea. 30 tablet 1    ondansetron (ZOFRAN-ODT) 8 MG TbDL Take 1 tablet (8 mg total) by mouth every 8 (eight) hours as needed (nausea/vomiting). 21 tablet 0     No current facility-administered medications for this visit.        Review of Systems   Constitutional: Positive for chills and fatigue.   Respiratory: Positive for shortness of breath. Negative for cough.    Cardiovascular: Negative for chest pain and palpitations.   Gastrointestinal: Negative for abdominal pain, diarrhea, nausea and vomiting.   Genitourinary: Negative for difficulty urinating and dysuria.   Musculoskeletal: Positive for back pain and gait problem.   Skin: Negative for rash.   Neurological: Positive for tremors, weakness and numbness. Negative for headaches.   Hematological: Negative for adenopathy.       ECOG Performance Status: 2   Objective:      Vitals:   Vitals:    05/09/19 1032   BP: (!) 155/86   Pulse: 80   Temp: 98.7 °F (37.1 °C)   SpO2: (!) 94%   Weight: 74.9 kg (165 lb 2 oz)   Height: 5' 3" (1.6 m)     BMI: Body mass index is 29.25 kg/m².    Physical Exam   Constitutional: She is oriented to person, place, and time. She appears well-developed and well-nourished. No distress.   HENT:   Head: Normocephalic and atraumatic.   Mouth/Throat: Oropharynx is clear and moist.   Eyes: Pupils are equal, round, and reactive to light. Conjunctivae and EOM are normal.   Neck: Normal range of motion. Neck supple.   Cardiovascular: Normal rate, regular rhythm and normal heart sounds. Exam reveals no gallop and no friction rub.   No " murmur heard.  Pulmonary/Chest: Effort normal and breath sounds normal. No respiratory distress. She has no wheezes. She has no rales.   Abdominal: Soft. Bowel sounds are normal. She exhibits no distension. There is no tenderness. There is no guarding.   Musculoskeletal: Normal range of motion. She exhibits no edema.   Lymphadenopathy:     She has no cervical adenopathy.   Neurological: She is alert and oriented to person, place, and time. No cranial nerve deficit.   Intentional R hand tremor; RUE - 4/5 strength and RLE extremity also 4/5; full strengths on LUE and LLE   Skin: Skin is warm and dry. No rash noted.       Laboratory Data:  Lab Visit on 05/09/2019   Component Date Value Ref Range Status    WBC 05/09/2019 3.94  3.90 - 12.70 K/uL Final    RBC 05/09/2019 3.67* 4.00 - 5.40 M/uL Final    Hemoglobin 05/09/2019 9.1* 12.0 - 16.0 g/dL Final    Hematocrit 05/09/2019 30.1* 37.0 - 48.5 % Final    Mean Corpuscular Volume 05/09/2019 82  82 - 98 fL Final    Mean Corpuscular Hemoglobin 05/09/2019 24.8* 27.0 - 31.0 pg Final    Mean Corpuscular Hemoglobin Conc 05/09/2019 30.2* 32.0 - 36.0 g/dL Final    RDW 05/09/2019 19.4* 11.5 - 14.5 % Final    Platelets 05/09/2019 220  150 - 350 K/uL Final    MPV 05/09/2019 10.8  9.2 - 12.9 fL Final    Immature Granulocytes 05/09/2019 0.3  0.0 - 0.5 % Final    Gran # (ANC) 05/09/2019 2.5  1.8 - 7.7 K/uL Final    Immature Grans (Abs) 05/09/2019 0.01  0.00 - 0.04 K/uL Final    Comment: Mild elevation in immature granulocytes is non specific and   can be seen in a variety of conditions including stress response,   acute inflammation, trauma and pregnancy. Correlation with other   laboratory and clinical findings is essential.      Lymph # 05/09/2019 0.9* 1.0 - 4.8 K/uL Final    Mono # 05/09/2019 0.4  0.3 - 1.0 K/uL Final    Eos # 05/09/2019 0.1  0.0 - 0.5 K/uL Final    Baso # 05/09/2019 0.00  0.00 - 0.20 K/uL Final    nRBC 05/09/2019 0  0 /100 WBC Final    Gran%  05/09/2019 63.1  38.0 - 73.0 % Final    Lymph% 05/09/2019 23.1  18.0 - 48.0 % Final    Mono% 05/09/2019 11.2  4.0 - 15.0 % Final    Eosinophil% 05/09/2019 2.3  0.0 - 8.0 % Final    Basophil% 05/09/2019 0.0  0.0 - 1.9 % Final    Differential Method 05/09/2019 Automated   Final    Sodium 05/09/2019 138  136 - 145 mmol/L Final    Potassium 05/09/2019 3.8  3.5 - 5.1 mmol/L Final    Chloride 05/09/2019 108  95 - 110 mmol/L Final    CO2 05/09/2019 25  23 - 29 mmol/L Final    Glucose 05/09/2019 88  70 - 110 mg/dL Final    BUN, Bld 05/09/2019 32* 8 - 23 mg/dL Final    Creatinine 05/09/2019 1.3  0.5 - 1.4 mg/dL Final    Calcium 05/09/2019 8.8  8.7 - 10.5 mg/dL Final    Total Protein 05/09/2019 13.2* 6.0 - 8.4 g/dL Final    Albumin 05/09/2019 3.0* 3.5 - 5.2 g/dL Final    Total Bilirubin 05/09/2019 0.5  0.1 - 1.0 mg/dL Final    Comment: For infants and newborns, interpretation of results should be based  on gestational age, weight and in agreement with clinical  observations.  Premature Infant recommended reference ranges:  Up to 24 hours.............<8.0 mg/dL  Up to 48 hours............<12.0 mg/dL  3-5 days..................<15.0 mg/dL  6-29 days.................<15.0 mg/dL      Alkaline Phosphatase 05/09/2019 52* 55 - 135 U/L Final    AST 05/09/2019 13  10 - 40 U/L Final    ALT 05/09/2019 19  10 - 44 U/L Final    Anion Gap 05/09/2019 5* 8 - 16 mmol/L Final    eGFR if  05/09/2019 48.0* >60 mL/min/1.73 m^2 Final    eGFR if non African American 05/09/2019 41.7* >60 mL/min/1.73 m^2 Final    Comment: Calculation used to obtain the estimated glomerular filtration  rate (eGFR) is the CKD-EPI equation.       Uric Acid 05/09/2019 6.9* 2.4 - 5.7 mg/dL Final   Admission on 04/30/2019, Discharged on 05/08/2019   No results displayed because visit has over 200 results.            Imaging:  Assessment:       1. Multiple myeloma not having achieved remission    2. Metastatic multiple myeloma to bone     3. Chronic kidney disease, stage III (moderate)            Plan:     Multiple Myeloma  - ECOG PS 2  - pt met most of Crab criteria in hospital - had anemia (hgb 7.1) corrected calcium of 10.6, renal function with Cr of 2.7 and T7 verterbral fracture  - Kappa was noted to be 548, with a k/l ratio of 693.7.  - ISS Stage III based on beta-2 microglobulin of 17.5; formal R-ISS staging pending cytogenetics and PCPD FISH  - Bone marrow biopsy completed on 5/3 revealed a plasma cell population of 70-80% for this patient  -  PET-CT scheduled for 5/15.   -SPEP revealsM-protein was noted to be 6.75 g/dL, NATALY reveals IgG kappa monoclonal protein    - Urine protein 24h quantification is still pending  - Cr of 1.3 today, s/p four doses of dexamethasone 40 mg, w last dose on 5/9  - CrCl of 48 and so will start patient on 10 mg of revilimid daily, working with [employer] to arrange this medication  - Plan for velcade 1.3 mg/m2 weekly on days 1,8.15.22 for 28 day cycles, also will plan to dose dexamethasone 40 mg weekly as well on days of velcade shots; plan to start first cycle next week  - went over risks of neuropathy, blood clots, VZV reactivation, and GI symptoms such as nausea, diarrhea and pt also asked to call us and/or come to ER for new fevers greater than 100.4F or new onset burising bleeding. 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 Odonnell    F/u:  See again in clinic next week with CBC, CMP, uric acid, folate, and H pylori IgG prior to visit. Requesting chair for weekly Velcade, starting next Thursday.   Orders placed for Zometa w prior auth, can administer at next visit    Margarito Napoles MD  Hematology and Oncology Fellow, PGY IV  [facility]    Distress Screening Results: Psychosocial Distress screening score of Distress Score: 5 noted and reviewed. No intervention indicated.

## 2019-05-11 NOTE — TELEPHONE ENCOUNTER
DOCUMENTATION ONLY:  Prior authorization for Revlimid approved from 5-11-19 to 11-11-19  Co-pay: 2683.53    Patient Assistance IS  required

## 2019-05-13 DIAGNOSIS — C90.00 MULTIPLE MYELOMA NOT HAVING ACHIEVED REMISSION: Primary | ICD-10-CM

## 2019-05-13 LAB
GENETICIST REVIEW: NORMAL
PLASMA CELL PROLIF RELEASED BY: NORMAL
PLASMA CELL PROLIF RESULT SUMMARY: NORMAL
PLASMA CELL PROLIF RESULT TABLE: NORMAL
REASON FOR REFERRAL, PLASMA CELL PROLIF (PCPD), FISH: NORMAL
REF LAB TEST METHOD: NORMAL
RESULTS, PLASMA CELL PROLIF (PCPD), FISH: NORMAL
SERVICE CMNT-IMP: NORMAL
SERVICE CMNT-IMP: NORMAL
SPECIMEN SOURCE: NORMAL
SPECIMEN, PLASMA CELL PROLIF (PCPD), FISH: NORMAL

## 2019-05-13 RX ORDER — LENALIDOMIDE 10 MG/1
10 CAPSULE ORAL DAILY
Qty: 21 EACH | Refills: 0 | Status: SHIPPED | OUTPATIENT
Start: 2019-05-13 | End: 2019-06-05 | Stop reason: SDUPTHER

## 2019-05-13 NOTE — PLAN OF CARE
START ON PATHWAY REGIMEN - Multiple Myeloma and Other Plasma Cell Dyscrasias    [id number]        Bortezomib (Velcade(R))       Lenalidomide (Revlimid(R))       Dexamethasone (Decadron(R))           Additional Orders: Herpes zoster prophylaxis recommended. Antithrombotic   therapy: aspirin  mg PO daily for patients at low risk; enoxaparin 40 mg   subcut daily or warfarin to an INR of 2-3 for patients at higher risk.    **Always confirm dose/schedule in your pharmacy ordering system**        Patient Characteristics:  Newly Diagnosed, Transplant Ineligible or Refused, Unknown or Awaiting Test   Results  R-ISS Staging: Unknown  Disease Classification: Newly Diagnosed  Is Patient Eligible for Transplant<= Transplant Ineligible or Refused  Risk Status: Awaiting Test Results  Intent of Therapy:  Non-Curative / Palliative Intent, Discussed with Patient

## 2019-05-13 NOTE — TELEPHONE ENCOUNTER
We have received an approval for the patients Revlimid until 11/11/19 with a co-pay of $0.00. This medication is a limited distribution drug, and cannot be filled with [facility]. Please send a new prescription for Revlimid to [facility] specialty pharmacy, which is listed in the patients Epic profile. The patient has been notified.     To complete this in EPIC, the original order MUST be discontinued and re-typed as a new prescription with the updated pharmacy listed. Clicking reorder will continue to route the Rx to [facility], even if the pharmacy is changed. Please opt the patient out of [facility] when the BPA is fired.    Please enroll patient in REMS    Sending a staff message to Dr Bebeto Odonnell regarding approval and transfer.

## 2019-05-13 NOTE — TELEPHONE ENCOUNTER
FOR DOCUMENTATION ONLY:  Financial Assistance for Revlimid is approved from 4/13/19 to 4/12/20  [facility]  BIN: [id number]  PCN: [id number]  ID: [id number]  GRP: [id number]  Patient Savings: [id number]

## 2019-05-13 NOTE — TELEPHONE ENCOUNTER
"----- Message from Ashleigh Brooks sent at 5/13/2019  9:41 AM CDT -----  Regarding: Revlimid Approval and Transfer  FYI: We have received an approval for the patients Revlimid until 11/11/19 with a co-pay of $0.00. This medication is a limited distribution drug, and cannot be filled with [facility]. Please send a new prescription for Revlimid to [facility] specialty pharmacy, which is listed in the patients Epic profile. The patient has been notified.     To complete this in EPIC, the original order MUST be discontinued and re-typed as a new prescription with the updated pharmacy listed. Clicking "reorder" will continue to route the Rx to [facility], even if the pharmacy is changed. Please opt the patient out of [facility] when the BPA is fired.    Please enroll patient in REMS    "

## 2019-05-15 ENCOUNTER — HOSPITAL ENCOUNTER (OUTPATIENT)
Dept: RADIOLOGY | Facility: HOSPITAL | Age: 69
Discharge: HOME | End: 2019-05-15
Payer: MEDICARE

## 2019-05-15 DIAGNOSIS — C90.00 MULTIPLE MYELOMA NOT HAVING ACHIEVED REMISSION: ICD-10-CM

## 2019-05-15 DIAGNOSIS — D49.2 BONE NEOPLASM: ICD-10-CM

## 2019-05-15 LAB — POCT GLUCOSE: 114 MG/DL (ref 70–110)

## 2019-05-15 PROCEDURE — A9552 F18 FDG: HCPCS | Mod: HCNC

## 2019-05-15 PROCEDURE — 78815 PET IMAGE W/CT SKULL-THIGH: CPT | Mod: 26,PI,HCNC, | Performed by: RADIOLOGY

## 2019-05-15 PROCEDURE — 78815 NM PET CT ROUTINE: ICD-10-PCS | Mod: 26,PI,HCNC, | Performed by: RADIOLOGY

## 2019-05-15 PROCEDURE — 78815 PET IMAGE W/CT SKULL-THIGH: CPT | Mod: TC,HCNC,PI

## 2019-05-16 ENCOUNTER — LAB VISIT (OUTPATIENT)
Dept: LAB | Facility: HOSPITAL | Age: 69
End: 2019-05-16
Payer: MEDICARE

## 2019-05-16 ENCOUNTER — OFFICE VISIT (OUTPATIENT)
Dept: HEMATOLOGY/ONCOLOGY | Facility: CLINIC | Age: 69
End: 2019-05-16
Payer: MEDICARE

## 2019-05-16 VITALS
SYSTOLIC BLOOD PRESSURE: 114 MMHG | TEMPERATURE: 98 F | HEIGHT: 63 IN | OXYGEN SATURATION: 94 % | HEART RATE: 83 BPM | WEIGHT: 164 LBS | DIASTOLIC BLOOD PRESSURE: 59 MMHG | BODY MASS INDEX: 29.06 KG/M2

## 2019-05-16 DIAGNOSIS — C90.00 METASTATIC MULTIPLE MYELOMA TO BONE: ICD-10-CM

## 2019-05-16 DIAGNOSIS — C90.00 MULTIPLE MYELOMA NOT HAVING ACHIEVED REMISSION: ICD-10-CM

## 2019-05-16 DIAGNOSIS — C90.00 MULTIPLE MYELOMA NOT HAVING ACHIEVED REMISSION: Primary | ICD-10-CM

## 2019-05-16 LAB
ALBUMIN SERPL BCP-MCNC: 2.8 G/DL (ref 3.5–5.2)
ALP SERPL-CCNC: 65 U/L (ref 55–135)
ALT SERPL W/O P-5'-P-CCNC: 8 U/L (ref 10–44)
ANION GAP SERPL CALC-SCNC: 6 MMOL/L (ref 8–16)
AST SERPL-CCNC: 13 U/L (ref 10–40)
BASOPHILS # BLD AUTO: 0.05 K/UL (ref 0–0.2)
BASOPHILS NFR BLD: 0.9 % (ref 0–1.9)
BILIRUB SERPL-MCNC: 0.4 MG/DL (ref 0.1–1)
BUN SERPL-MCNC: 18 MG/DL (ref 8–23)
CALCIUM SERPL-MCNC: 9.1 MG/DL (ref 8.7–10.5)
CHLORIDE SERPL-SCNC: 106 MMOL/L (ref 95–110)
CO2 SERPL-SCNC: 25 MMOL/L (ref 23–29)
CREAT SERPL-MCNC: 1.2 MG/DL (ref 0.5–1.4)
DIFFERENTIAL METHOD: ABNORMAL
EOSINOPHIL # BLD AUTO: 0.1 K/UL (ref 0–0.5)
EOSINOPHIL NFR BLD: 2.2 % (ref 0–8)
ERYTHROCYTE [DISTWIDTH] IN BLOOD BY AUTOMATED COUNT: 19.7 % (ref 11.5–14.5)
EST. GFR  (AFRICAN AMERICAN): 52.9 ML/MIN/1.73 M^2
EST. GFR  (NON AFRICAN AMERICAN): 45.9 ML/MIN/1.73 M^2
GLUCOSE SERPL-MCNC: 96 MG/DL (ref 70–110)
HCT VFR BLD AUTO: 29 % (ref 37–48.5)
HGB BLD-MCNC: 8.9 G/DL (ref 12–16)
IMM GRANULOCYTES # BLD AUTO: 0.04 K/UL (ref 0–0.04)
IMM GRANULOCYTES NFR BLD AUTO: 0.7 % (ref 0–0.5)
LYMPHOCYTES # BLD AUTO: 1.2 K/UL (ref 1–4.8)
LYMPHOCYTES NFR BLD: 22.8 % (ref 18–48)
MCH RBC QN AUTO: 24.9 PG (ref 27–31)
MCHC RBC AUTO-ENTMCNC: 30.7 G/DL (ref 32–36)
MCV RBC AUTO: 81 FL (ref 82–98)
MONOCYTES # BLD AUTO: 0.4 K/UL (ref 0.3–1)
MONOCYTES NFR BLD: 6.7 % (ref 4–15)
NEUTROPHILS # BLD AUTO: 3.6 K/UL (ref 1.8–7.7)
NEUTROPHILS NFR BLD: 66.7 % (ref 38–73)
NRBC BLD-RTO: 0 /100 WBC
PLATELET # BLD AUTO: 282 K/UL (ref 150–350)
PMV BLD AUTO: 11 FL (ref 9.2–12.9)
POTASSIUM SERPL-SCNC: 3.8 MMOL/L (ref 3.5–5.1)
PROT SERPL-MCNC: 12.5 G/DL (ref 6–8.4)
RBC # BLD AUTO: 3.58 M/UL (ref 4–5.4)
SODIUM SERPL-SCNC: 137 MMOL/L (ref 136–145)
WBC # BLD AUTO: 5.35 K/UL (ref 3.9–12.7)

## 2019-05-16 PROCEDURE — 3078F PR MOST RECENT DIASTOLIC BLOOD PRESSURE < 80 MM HG: ICD-10-PCS | Mod: HCNC,CPTII,GC,S$GLB | Performed by: STUDENT IN AN ORGANIZED HEALTH CARE EDUCATION/TRAINING PROGRAM

## 2019-05-16 PROCEDURE — 80053 COMPREHEN METABOLIC PANEL: CPT | Mod: HCNC

## 2019-05-16 PROCEDURE — 99999 PR PBB SHADOW E&M-EST. PATIENT-LVL III: CPT | Mod: PBBFAC,HCNC,GC, | Performed by: STUDENT IN AN ORGANIZED HEALTH CARE EDUCATION/TRAINING PROGRAM

## 2019-05-16 PROCEDURE — 3074F SYST BP LT 130 MM HG: CPT | Mod: HCNC,CPTII,GC,S$GLB | Performed by: STUDENT IN AN ORGANIZED HEALTH CARE EDUCATION/TRAINING PROGRAM

## 2019-05-16 PROCEDURE — 99999 PR PBB SHADOW E&M-EST. PATIENT-LVL III: ICD-10-PCS | Mod: PBBFAC,HCNC,GC, | Performed by: STUDENT IN AN ORGANIZED HEALTH CARE EDUCATION/TRAINING PROGRAM

## 2019-05-16 PROCEDURE — 3078F DIAST BP <80 MM HG: CPT | Mod: HCNC,CPTII,GC,S$GLB | Performed by: STUDENT IN AN ORGANIZED HEALTH CARE EDUCATION/TRAINING PROGRAM

## 2019-05-16 PROCEDURE — 3074F PR MOST RECENT SYSTOLIC BLOOD PRESSURE < 130 MM HG: ICD-10-PCS | Mod: HCNC,CPTII,GC,S$GLB | Performed by: STUDENT IN AN ORGANIZED HEALTH CARE EDUCATION/TRAINING PROGRAM

## 2019-05-16 PROCEDURE — 85025 COMPLETE CBC W/AUTO DIFF WBC: CPT | Mod: HCNC

## 2019-05-16 PROCEDURE — 99215 OFFICE O/P EST HI 40 MIN: CPT | Mod: HCNC,GC,S$GLB, | Performed by: STUDENT IN AN ORGANIZED HEALTH CARE EDUCATION/TRAINING PROGRAM

## 2019-05-16 PROCEDURE — 99215 PR OFFICE/OUTPT VISIT, EST, LEVL V, 40-54 MIN: ICD-10-PCS | Mod: HCNC,GC,S$GLB, | Performed by: STUDENT IN AN ORGANIZED HEALTH CARE EDUCATION/TRAINING PROGRAM

## 2019-05-16 PROCEDURE — 36415 COLL VENOUS BLD VENIPUNCTURE: CPT | Mod: HCNC

## 2019-05-16 PROCEDURE — 1101F PT FALLS ASSESS-DOCD LE1/YR: CPT | Mod: HCNC,CPTII,GC,S$GLB | Performed by: STUDENT IN AN ORGANIZED HEALTH CARE EDUCATION/TRAINING PROGRAM

## 2019-05-16 PROCEDURE — 1101F PR PT FALLS ASSESS DOC 0-1 FALLS W/OUT INJ PAST YR: ICD-10-PCS | Mod: HCNC,CPTII,GC,S$GLB | Performed by: STUDENT IN AN ORGANIZED HEALTH CARE EDUCATION/TRAINING PROGRAM

## 2019-05-16 RX ORDER — DEXAMETHASONE 4 MG/1
40 TABLET ORAL WEEKLY
Qty: 80 TABLET | Refills: 0 | Status: SHIPPED | OUTPATIENT
Start: 2019-05-16 | End: 2019-07-11

## 2019-05-16 RX ORDER — LIDOCAINE 50 MG/G
1 PATCH TOPICAL DAILY
Qty: 30 PATCH | Refills: 0 | Status: SHIPPED | OUTPATIENT
Start: 2019-05-16 | End: 2019-06-20

## 2019-05-16 NOTE — LETTER
May 16, 2019    {enter recipient's name}  {enter recipient's address}             [facility]  Hematology and Oncology  [address]  [facility] [address]  Phone: [phone]   May 16, 2019     Patient: Shelby Thompson   YOB: 1950   Date of Visit: 5/16/2019       To Whom it May Concern:    Shelby Thompson was seen in my clinic on 5/16/2019. She {Return to school/sport/work:08130}.    If you have any questions or concerns, please don't hesitate to call.    Sincerely,         Genny Napoles MD

## 2019-05-16 NOTE — PROGRESS NOTES
PATIENT: Shelby Thompson  MRN: [id number]  DATE: 5/16/2019      Diagnosis:   1. Multiple myeloma not having achieved remission    2. 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 (2008 and 2011, w residual defecitis and weaknessin R side/tremor on R hand), L Breast cancer s/p lumpectomy and radiation for stage 0 DCIS( in 2011, did not do endocrine tx due to CVA at that time), PVcs, DMII (not on current meds, prior A1c of 7.4), HTN, neuropathy of bilateral hands, liver lesions (which are possibly vascular per recent MRI in 05/2019), and hx of iron defeciency anemia, statin intolerance (on a PCSK9 inihibitor, alirocumab or praluent), anxiety/derpression, new onset Heart Failure, who presents to the clinic for further management of her recently diagnosed multiple myeloma. The pt states that she had not known about heart failure until this past admission, when she was note to have an EF of 55% and grade II diastolic dysfunction w elevated pulm artery pressure of 41; she was admitted to the hospital on 4/30/19 - 5/8/19 for an exacerbation of her worsening dyspnea over the prior two to three weeks as well as intermittent memory loss. She was noted to have O2 sats of 87% in her PCP, Dr. Arevalo's office and intiially was sent to the hospital in the evaluation for a PE. A CTA chest which was completed did not reveal any thrombus in pulmonary arteries but did reveal b/l ground glass opacities. At the time of her admission, she complained about pain under L breast and also pain in her L flank. PAtient does not recall if she had lost weight over the past few months but does think that she lost some weight in this last admission. Since leaving the [facility]", the patient has been more dependent on a rollator to move around. Also, the patient has had worsening nausea at home without any  vomiting.      The patient had anemia to Hgb of 7.1, corrected calcium of 10.6, decreased renal function with Cr of 2.7, and a new T7 veterbral fracture. She currently has mid/low back pain and L flank pain.  Kappa was noted to be 548, with a k/l ratio of 693.7. Bone marrow biopsy completed on 5/3 revealed a plasma cell population of 70-80% for this patient. She has a PET-CT scheduled for 5/15. Beta2 microlglobulin was noted to be 17.5 and albumin of 2.5. Also, the patient was found to have an IgG kappa monoclonal protein  THe M-protein was noted to be 6.75 g/dL. Urine protein quantification is still pending.  The pt has had numbness /tingling in her hands post CVA, for which she is on gabapentin 300 mg BID.  The patient also had renal failure initially which dropped to 1.4 yesterday; she is s/p four days of dexamethasone x 40 mg.         The patient had an EGD in 2016 which had revealed non-bleeding angioectasia and also a colonosopy in March 2015 which showed a 5 mm hyperplastic polyp. In addition, the patient has been on iron supplements, with ferrous gluconate 324 BID. During recent admission in 05/2019, The pt did receive 1 U of pRBC in the hospital. Her ferritin is 54; the pt has not required prior trasnsfusion and no history of IV iron use.  Also, the patient is still on allopurinol 300 mg for her suspected TLS noted in the hospital. Uric acid on her discharge was 8.2. Her Hgb was 7.8 on day of discharge for this paitnet.    IN the past, she had a referral to Hepatology for elevated liver enzymes and found to have liver lesions and had subsequent US guided liver biopsy in 2015, which did not reveal fibrosis and was otherwise normal in apparenace. The patient had negative serolologic testing for Gino's, alpha-1 antitrypsin defeciency, hemochromatosis, autoimmune hepatitis, hemochromatosis, and viral hepatiitis at that time. MRI abdomen/pelvis completed on 5/1/19 - pt was found to have multiple  intedeterminate hepatic lesions and two additional hepatic lesions from prior ultrasound were suggestive of vascular malformations (CT had revealed hepatic lesions measuring 1.8 cm in hepatic segment 5 and also 1.3 cm in hepatic segment 8). The pt had a splenic lesion, which was noted to be about 2 cm on CT abdomen/pelvis on 4/10/19. Also, the patient had a 1 cm soft tissue lesion anterior to aorta and inferior to 3rd portion of duodenum. She had  thickened endometrium noted on pelvic ultrasound, which was not thought to require any further evaluation given no sx at time, when pt was evaluated by OB/GYN Dr. Antonio.      For the heart failure, the ptis on lasix 20 mg daily for diuresis. A congo red stain was completed on BM biopsy and was negative.   She had COPD and quit smokling in 2011, after smoking about 1/2 pack a day for about 42 years. She does not have any regular use of inhalers and does not keep any at home. She is currently on baby aspirin at home. Pt is edentulous.    The patient is seen with her  and also her daughter, Jessica. Also, the patient has one sister with breast cancer and one sister with lung cancer; there is no hematologic malignancy in the family. She is a retired .       Subjective:   She is still having persistent nausea, although she hasn't been taking zofran at first sign of nausea yet. She did have one episode of vomiting yesterday. The pt states that she had lost about 13 pounds since being in the hospital but some of this is fluid weight. Also, she complains of congestion for which she is told to try some mucinex.    The pt does endorse to taking aspirin and acylcovir regularly. Her revilimid has not arrived yet. She continues to have pain in L ribs, had been getting lidocaine patches in the hospital which helped with this local pain. She doesn't want to take any sedating meds either at this time.  She had been drinking 1-2 bottles of nutritional supplements but was also  trying to find out about any assistance programs which would be available to help afford this as well.     She is seen with her friend in clinic today and her friend is a breast cancer survivor as well, plans to help Ms. Thompson along the way with course of cancer therapy.     Past Medical History:   Past Medical History:   Diagnosis Date    Acute respiratory failure with hypoxia 4/30/2019    FUENTES (acute kidney injury) 5/1/2019    Allergy     Anemia     Aortic aneurysm     Breast cancer 10/2011    left breast Stage 0 DCIS    Chronic diastolic congestive heart failure 11/6/2015    Colon polyp     GERD (gastroesophageal reflux disease)     History of colonic polyps     HX: breast cancer     Hyperlipemia     Hypertension     ICH (intracerebral hemorrhage)     Major depressive disorder, single episode, mild 6/23/2016    Nuclear sclerosis 7/21/2014    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 7/21/2014    PAD (peripheral artery disease) 11/6/2015    Stroke 4/2011       Past Surgical HIstory:   Past Surgical History:   Procedure Laterality Date    APPENDECTOMY      BIOPSY LIVER AND ULTRASOUND N/A 4/6/2015    Performed by [facility] at [facility] OR 2ND FLR    BREAST BIOPSY Left 10/2011    BREAST LUMPECTOMY Left 2011    DCIS    COLONOSCOPY      COLONOSCOPY N/A 3/26/2015    Performed by Wicho Woodard MD at [facility] ENDO (4TH FLR)    CYST REMOVAL      back    ESOPHAGOGASTRODUODENOSCOPY  07/2016    duodenal angioectasia    ESOPHAGOGASTRODUODENOSCOPY (EGD) N/A 7/27/2016    Performed by Malik Sanchez MD at [facility] ENDO (4TH FLR)    FOOT FRACTURE SURGERY  10/2012    right foot, with R hallux valgus repair    FRACTURE SURGERY Right     broken toe repiar    HEMORRHOID SURGERY      LIVER BIOPSY      TUBAL LIGATION      UPPER GASTROINTESTINAL ENDOSCOPY         Family History:   Family History   Problem Relation Age of Onset    No Known Problems Sister     Cancer  Sister 63        Lung Cancer    No Known Problems Mother     Cancer Father     No Known Problems Brother     Hypertension Daughter     Fibroids Daughter         uterine    Hypertension Son     Breast cancer Sister 62    No Known Problems Brother     No Known Problems Maternal Aunt     No Known Problems Maternal Uncle     No Known Problems Paternal Aunt     No Known Problems Paternal Uncle     Hypertension Maternal Grandmother     No Known Problems Maternal Grandfather     No Known Problems Paternal Grandmother     No Known Problems Paternal Grandfather     Ovarian cancer Neg Hx     Colon cancer Neg Hx     Tremor Neg Hx     Amblyopia Neg Hx     Blindness Neg Hx     Cataracts Neg Hx     Diabetes Neg Hx     Glaucoma Neg Hx     Macular degeneration Neg Hx     Retinal detachment Neg Hx     Strabismus Neg Hx     Stroke Neg Hx     Thyroid disease Neg Hx     Esophageal cancer Neg Hx     Rectal cancer Neg Hx     Stomach cancer Neg Hx     Ulcerative colitis Neg Hx     Crohn's disease Neg Hx     Irritable bowel syndrome Neg Hx     Celiac disease Neg Hx        Social History:  reports that she quit smoking about 8 years ago. Her smoking use included cigarettes. She has a 10.00 pack-year smoking history. She quit smokeless tobacco use about 8 years ago. She reports that she drinks alcohol. She reports that she does not use drugs.    Allergies:  Review of patient's allergies indicates:   Allergen Reactions    Pravastatin Other (See Comments)     Itching, elevated cpk, muscle aches       Medications:  Current Outpatient Medications   Medication Sig Dispense Refill    acetaminophen (TYLENOL) 650 MG TbSR Take 1,300 mg by mouth daily as needed (pain).      acyclovir (ZOVIRAX) 400 MG tablet Take 1 tablet (400 mg total) by mouth 2 (two) times daily. 120 tablet 1    alirocumab (PRALUENT PEN) 75 mg/mL PnIj Inject 1 mL (75 mg total) into the skin every 14 (fourteen) days. Ask your PCP/ Oncologist 2  mL 11    allopurinol (ZYLOPRIM) 300 MG tablet Take 1 tablet (300 mg total) by mouth once daily. 30 tablet 1    amLODIPine (NORVASC) 5 MG tablet TAKE 1 TABLET BY MOUTH EVERY DAY 90 tablet 3    aspirin 81 mg Tab Take 1 tablet by mouth Daily.      bisacodyl (DULCOLAX) 5 mg EC tablet Take 5 mg by mouth once daily.      ferrous gluconate 324 mg (37.5 mg iron) Tab TAKE 1 TABLET BY MOUTH 2 (TWO) TIMES DAILY WITH MEALS. 180 tablet 0    furosemide (LASIX) 20 MG tablet Take 1 tablet (20 mg total) by mouth once daily. Take for 1 week. And ask PCP/Oncolgy before continuing 30 tablet 0    gabapentin (NEURONTIN) 300 MG capsule TAKE 1 CAPSULE (300 MG TOTAL) BY MOUTH 2 (TWO) TIMES DAILY. 180 capsule 3    lenalidomide (REVLIMID) 10 mg Cap Take 10 mg by mouth once daily Take days 1-21 and then off 7 days.  auth # [id number] on 5/9/19. 21 each 0    losartan-hydrochlorothiazide 100-25 mg (HYZAAR) 100-25 mg per tablet Take 1 tablet by mouth once daily. Hold until continued by PCP/Oncologist 90 tablet 3    omeprazole (PRILOSEC) 40 MG capsule TAKE 1 CAPSULE (40 MG TOTAL) BY MOUTH ONCE DAILY. 90 capsule 3    ondansetron (ZOFRAN) 4 MG tablet Take 1 tablet (4 mg total) by mouth every 6 (six) hours as needed for Nausea. 30 tablet 1    ondansetron (ZOFRAN-ODT) 8 MG TbDL Take 1 tablet (8 mg total) by mouth every 8 (eight) hours as needed (nausea/vomiting). 21 tablet 0    sertraline (ZOLOFT) 50 MG tablet TAKE 1 TABLET (50 MG TOTAL) BY MOUTH ONCE DAILY. 90 tablet 4    traMADol (ULTRAM) 50 mg tablet Take 1 tablet (50 mg total) by mouth 2 (two) times daily as needed for Pain. 60 tablet 3    vitamin D 1000 units Tab Take 185 mg by mouth once daily.      dexamethasone (DECADRON) 4 MG Tab Take 10 tablets (40 mg total) by mouth once a week. Please take on day of Velcade administration. 80 tablet 0     Current Facility-Administered Medications   Medication Dose Route Frequency Provider Last Rate Last Dose    zoledronic acid (ZOMETA) 4 mg  "in sodium chloride 0.9% 100 mL IVPB  4 mg Intravenous 1 time in Clinic/HOD Genny Napoles MD           Review of Systems   Constitutional: Positive for chills, fatigue and unexpected weight change.   Respiratory: Positive for shortness of breath. Negative for cough.    Cardiovascular: Negative for chest pain and palpitations.   Gastrointestinal: Positive for nausea and vomiting. Negative for abdominal pain and diarrhea.   Genitourinary: Negative for difficulty urinating and dysuria.   Musculoskeletal: Positive for back pain and gait problem.   Skin: Negative for rash.   Neurological: Positive for tremors, weakness and numbness. Negative for headaches.   Hematological: Negative for adenopathy.       ECOG Performance Status: 1   Objective:      Vitals:   Vitals:    05/16/19 0835   BP: (!) 114/59   Pulse: 83   Temp: 98.1 °F (36.7 °C)   SpO2: (!) 94%   Weight: 74.4 kg (164 lb 0.4 oz)   Height: 5' 3" (1.6 m)     BMI: Body mass index is 29.06 kg/m².    Physical Exam   Constitutional: She is oriented to person, place, and time. She appears well-developed and well-nourished. No distress.   HENT:   Head: Normocephalic and atraumatic.   Mouth/Throat: Oropharynx is clear and moist.   Eyes: Pupils are equal, round, and reactive to light. Conjunctivae and EOM are normal.   Neck: Normal range of motion. Neck supple.   Cardiovascular: Normal rate, regular rhythm and normal heart sounds. Exam reveals no gallop and no friction rub.   No murmur heard.  Pulmonary/Chest: Effort normal and breath sounds normal. No respiratory distress. She has no wheezes. She has no rales.   Abdominal: Soft. Bowel sounds are normal. She exhibits no distension. There is no tenderness. There is no guarding.   Musculoskeletal: Normal range of motion. She exhibits no edema.   Lymphadenopathy:     She has no cervical adenopathy.   Neurological: She is alert and oriented to person, place, and time. No cranial nerve deficit.   Intentional R hand tremor; RUE " - 4/5 strength and RLE extremity also 4/5; full strengths on LUE and LLE   Skin: Skin is warm and dry. No rash noted.       Laboratory Data:  Lab Visit on 05/16/2019   Component Date Value Ref Range Status    Sodium 05/16/2019 137  136 - 145 mmol/L Final    Potassium 05/16/2019 3.8  3.5 - 5.1 mmol/L Final    Chloride 05/16/2019 106  95 - 110 mmol/L Final    CO2 05/16/2019 25  23 - 29 mmol/L Final    Glucose 05/16/2019 96  70 - 110 mg/dL Final    BUN, Bld 05/16/2019 18  8 - 23 mg/dL Final    Creatinine 05/16/2019 1.2  0.5 - 1.4 mg/dL Final    Calcium 05/16/2019 9.1  8.7 - 10.5 mg/dL Final    Total Protein 05/16/2019 12.5* 6.0 - 8.4 g/dL Final    Albumin 05/16/2019 2.8* 3.5 - 5.2 g/dL Final    Total Bilirubin 05/16/2019 0.4  0.1 - 1.0 mg/dL Final    Comment: For infants and newborns, interpretation of results should be based  on gestational age, weight and in agreement with clinical  observations.  Premature Infant recommended reference ranges:  Up to 24 hours.............<8.0 mg/dL  Up to 48 hours............<12.0 mg/dL  3-5 days..................<15.0 mg/dL  6-29 days.................<15.0 mg/dL      Alkaline Phosphatase 05/16/2019 65  55 - 135 U/L Final    AST 05/16/2019 13  10 - 40 U/L Final    ALT 05/16/2019 8* 10 - 44 U/L Final    Anion Gap 05/16/2019 6* 8 - 16 mmol/L Final    eGFR if  05/16/2019 52.9* >60 mL/min/1.73 m^2 Final    eGFR if non African American 05/16/2019 45.9* >60 mL/min/1.73 m^2 Final    Comment: Calculation used to obtain the estimated glomerular filtration  rate (eGFR) is the CKD-EPI equation.       WBC 05/16/2019 5.35  3.90 - 12.70 K/uL Final    RBC 05/16/2019 3.58* 4.00 - 5.40 M/uL Final    Hemoglobin 05/16/2019 8.9* 12.0 - 16.0 g/dL Final    Hematocrit 05/16/2019 29.0* 37.0 - 48.5 % Final    Mean Corpuscular Volume 05/16/2019 81* 82 - 98 fL Final    Mean Corpuscular Hemoglobin 05/16/2019 24.9* 27.0 - 31.0 pg Final    Mean Corpuscular Hemoglobin Conc  05/16/2019 30.7* 32.0 - 36.0 g/dL Final    RDW 05/16/2019 19.7* 11.5 - 14.5 % Final    Platelets 05/16/2019 282  150 - 350 K/uL Final    MPV 05/16/2019 11.0  9.2 - 12.9 fL Final    Immature Granulocytes 05/16/2019 0.7* 0.0 - 0.5 % Final    Gran # (ANC) 05/16/2019 3.6  1.8 - 7.7 K/uL Final    Immature Grans (Abs) 05/16/2019 0.04  0.00 - 0.04 K/uL Final    Comment: Mild elevation in immature granulocytes is non specific and   can be seen in a variety of conditions including stress response,   acute inflammation, trauma and pregnancy. Correlation with other   laboratory and clinical findings is essential.      Lymph # 05/16/2019 1.2  1.0 - 4.8 K/uL Final    Mono # 05/16/2019 0.4  0.3 - 1.0 K/uL Final    Eos # 05/16/2019 0.1  0.0 - 0.5 K/uL Final    Baso # 05/16/2019 0.05  0.00 - 0.20 K/uL Final    nRBC 05/16/2019 0  0 /100 WBC Final    Gran% 05/16/2019 66.7  38.0 - 73.0 % Final    Lymph% 05/16/2019 22.8  18.0 - 48.0 % Final    Mono% 05/16/2019 6.7  4.0 - 15.0 % Final    Eosinophil% 05/16/2019 2.2  0.0 - 8.0 % Final    Basophil% 05/16/2019 0.9  0.0 - 1.9 % Final    Differential Method 05/16/2019 Automated   Final   Hospital Outpatient Visit on 05/15/2019   Component Date Value Ref Range Status    POCT Glucose 05/15/2019 114* 70 - 110 mg/dL Final     Plasma Cell Prolif Result Summary Abnormal    Interp, Plasma Cell Prolif (PCPD), FISH SEE BELOW    Comment: The result is abnormal and indicates a near-tetraploid   plasma cell clone with IGH/MAFB fusion, t(14;20),   disruption of the MYC gene region, and monosomy 13.  At   diagnosis, this translocation has an unfavorable prognosis   in multiple myeloma (Correa et al., Blood 101:9644-1062,   2003). The prognostic significance for this abnormality in   MGUS, amyloidosis, or smoldering multiple myeloma is   unknown.   Additional cytogenetic studies are reported separately.    Plasma Cell Prolif Result Table SEE BELOW    Comment:  ----------------------------------------------------------   Abnormality Name             Result     # Abn  Total Cells   Near-tetraploid              Abnormal                         8q24.1(MYC sep)              Abnormal   37     43             -13(RB1,LAMP1)x2             Abnormal   48     50             14q32(IGH sep)               Abnormal   47     50             t(14;20) IGH/MAFB fusion     Abnormal   50     50             t(11;14) CCND1-XT/IGH-XT     Normal     0      50             fusion                                                       +11(CCND1-XTx3)              Normal     0      50             -17p13.1(TP53x1,B28G7b4)     Normal     0      50             -17(TP53,D17Z1)x1            Normal     0      50             -13q14(RB1x1,OGFW3n3)        Normal     2      50             +9CEN(D9Z1x3)                Normal     0      50             +15CEN(P60E7g4)              Normal     0      50             +7CEN(D7Z1x3)                Normal     0      50             +3CEN(D3Z1x3)                Normal     0      50             +8q24.1(MYCx3)               Normal     5      43             +1q22(TP73x2,1q22x3)         Normal     4      50             +1(TP73,1q22)x3              Normal     0      50             ----------------------------------------------------------    Results, Plasma Cell Prolif (PCPD), FISH SEE BELOW    Comment: nuc   elana(TP73,1q22)x3,(D3Z1,D7Z1,CCND1-XT,TP53,D17Z1)x4,(MYCx4)(5   'MYC sep   3'MYCx1),(D9Z1,D15Z4)x3-4,(RB1,LAMP1)x2,(IGH,MAFB)x5(IGH   con MAFBx3)    Reason for Referral, Plasma Cell Prolif (PCPD), FISH multiple myeloma/MM VC   Specimen, Plasma Cell Prolif (PCPD), FISH Bone Marrow    Source, Plasma Cell Prolif (PCPD), FISH Test Not Performed    Method, Plasma Cell Prolif (PCPD), FISH SEE BELOW    Comment: Locus and probes                   [Strategy;#Nuclei;Class]   -----------------------------------------------------------   1p36.3(TP73),1q22                             [COPY#;50;LDT]   3CEN(D3Z1),7CEN(D7Z1)                        [COPY#;50;ASR]   8q24(5'MYC,3'MYC)                              [BAP;50;ASR]   9CEN(D9Z1),15CEN(D15Z4)                      [COPY#;50;ASR]   11q13(CCND1-XT),14q32(IGH-XT)                [DFISH;50;ASR]   13q14(RB1),13q34(LAMP1)                      [COPY#;50;ASR]   14q32(3'IGH,5'IGH)                             [BAP;50;LDT]   14q32(IGH),20q12(MAFB)                       [DFISH;50;LDT]   17p13.1(TP53),17CEN(D17Z1)                   [COPY#;50;ASR]   Probe strategies include: DFISH=dual color, double fusion;   BAP=break-apart probe; COPY#=region gain and loss.    Plasma Cell Prolif Additional Information Test Not Performed    Plasma Cell Prolif Disclaimer SEE BELOW    Comment: Applicable to Analyte Specific Reagent (ASR) and Laboratory   Developed Tests (LDT). This test was developed and its   performance characteristics determined by [facility] in a   manner consistent with CLIA requirements. It has not been   cleared or approved by the U.S. Food and Drug   Administration. This FISH test does not rule out other   chromosome abnormalities.    Plasma Cell Prolif Released by Harriet Ferrari, Ph.D.          Imaging:       EXAMINATION:  NM PET CT ROUTINE    CLINICAL HISTORY:  Bone neoplasm suspect, symptomatic, xray negative;eval lesions from suspected Multiple Myeloma; Multiple myeloma not having achieved remission    TECHNIQUE:  11.9 mCi of F18-FDG was administered intravenously in the right hand.  After an approximately 60 min distribution time, PET/CT images were acquired from the brain to the mid thigh. Transmission images were acquired to correct for attenuation using a whole body low-dose CT scan without contrast with the arms positioned above the head. Glycemia at the time of injection was 114 mg/dL.    COMPARISON:  CT head from 05/01/2019.  CTA chest from 04/30/2019.  MRI abdomen/pelvis from 04/30/2019.    FINDINGS:  Quality of the study:  Adequate.    Diffuse radiotracer uptake throughout the axial and appendicular skeleton.  Index lesions as below.    Index left focal scapula lesion with SUV max of 3.04.    Index L5 vertebral body lesion with SUV max of 4.67    No focal abnormal uptake within the liver or spleen.    Physiologic uptake of the tracer is present within the brain, salivary glands, myocardium, GI and  tracts.    Incidental CT findings: Cardiomegaly.  Stable fusiform dilatation of the descending thoracic aorta measuring up to 3.4 cm, similar to prior CTA chest dated 04/30/2019. No saccular aneurysm. Stable T7 compression fracture with minimal height loss and stable minimally displaced left lateral 4th rib fracture. Stable postoperative changes status post left lumpectomy.      Impression       Diffuse radiotracer uptake throughout the axial and appendicular skeleton consistent with reported history of multiple myeloma. Index lesions as above.    Stable T7 compression fracture and left lateral 4th rib fracture, these could represent pathologic fractures.    No evidence of extramedullary disease.    Electronically signed by resident: Daniel Urbina  Date: 05/15/2019  Time: 11:19    Electronically signed by: Dawood Cifuentes  Date: 05/15/2019  Time: 15:18                           Assessment:       1. Multiple myeloma not having achieved remission    2. Metastatic multiple myeloma to bone           Plan:       Multiple Myeloma  - ECOG PS 1  - pt met most of Crab criteria in hospital - had anemia (hgb 7.1) corrected calcium of 10.6, renal function with Cr of 2.7 and T7 verterbral fracture  - Kappa was noted to be 548, with a k/l ratio of 693.7.  - ISS Stage III based on beta-2 microglobulin of 17.5  - PCPD fish with near-tetraploid  plasma cell clone with IGH/MAFB fusion, t(14;20),  disruption of the MYC gene region, and monosomy 13.  At  diagnosis, this translocation has an unfavorable prognosis in multiple myeloma (Correa et al., Blood  101:4100-3820, 2003).  - 14:20 translocation will place this as R-ISS Stage III, high risk disease  - Bone marrow biopsy completed on 5/3 revealed a plasma cell population of 70-80% for this patient  -  PET-CT on 5/15 results as above with left scapula lesion SUV 3, L5 vertebral body lesion SUV 4.7, stable T7 compression fracture and minimally displaced left lateral 4th rib fracture; no activity in previously noted in splenic or hepatic regions   -SPEP reveals M-protein was noted to be 6.75 g/dL, NATALY reveals IgG kappa monoclonal protein    - Urine protein 24h quantification, 663 mg   - Cr of 1.2 today, s/p four doses of dexamethasone 40 mg, w last dose on 5/9  - CrCl of 48 and so will start patient on 10 mg of revilimid daily, medication arranged through [employer]  - Plan for velcade 1.3 mg/m2 weekly on days 1,8.15.22 for 28 day cycles, also will plan to dose dexamethasone 40 mg weekly as well on days of velcade shots; plan to start first cycle next week; revilimid 10 mg due to decreased renal function  - went over risks of neuropathy, blood clots, VZV reactivation, and GI symptoms such as nausea, diarrhea and pt also asked to call us and/or come to ER for new fevers greater than 100.4F or new onset burising bleeding. Pt agrees to proceed with therapy and has had  sign consent form due to limited dexterity with R hand  - continue acyclovir 400 mg BID, continue aspirin 81 mg daily  - Hgb of 8.9 today, stable  - will also setup patient for zometa monthly, no need for dental eval as she has no current known jaw breakdown and is edentulous; will dose at 3.3 mg based on estimated CrCl      Nausea/ vomiting  - continue zofran at first sign of nausea  - will add secondary medications for additional control too if this becomes worse or uncontrolled     Malnutrition/loss of appetite  - sent message to  about arranging pt with boost/ensure      Anemia  - low ferritin in years past 4-8, currently is 54 on 4/30/19 while  on iron supplementation  - prior GI eval in 2015 and 16  - will continue to monitor for worsening anemia and signs sx of bleding  - check folate at visit, can also assess for H pylori     FUENTES  - 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 Rabago     F/u:  Prior to clinic visit next week, please check CBC, CMP, Mg, Phos, uric acid, SPEP and Immunofixation, free light chains, quant immunoglublins, uric acid, folate, and H pylori IgG (orders all placed) on same day. Requesting chair reservation for weekly Velcade, starting next Thursday.  Orders placed for Zometa, would like to administer with her first dose of Velcade next week.    After next week, will follow-up patient back in a month (on 6/20) with CBC, CMP, Mg, Phos,  Uric acid, free light chains, SPEP, NATALY, quant immunoglobulins checked prior to visit. Will also plan for Velcade, zometa on same day.     Genny Napoles MD   Hematology and Oncology Fellow, PGY IV  [facility]          Distress Screening Results: Psychosocial Distress screening score of Distress Score: 0 noted and reviewed. No intervention indicated.

## 2019-05-16 NOTE — Clinical Note
Scooter Owens, I was trying to see if we could provide Ms. Thompson with any assistance to get any boost or ensure during future visits or sent to her house. The family was getting them from [facility]'s but was looking for assistance.

## 2019-05-16 NOTE — Clinical Note
Prior to clinic visit next week, please check CBC, CMP, Mg, Phos, uric acid, SPEP and Immunofixation, free light chains, quant immunoglublins, uric acid, folate, and H pylori IgG (orders all placed) on same day. Requesting chair reservation for weekly Velcade, starting next Thursday.  Orders placed for Zometa, would like to administer with her first dose of Velcade next week.After next week, will follow-up patient back in a month (on 6/20) with CBC, CMP, Mg, Phos,  Uric acid, free light chains, SPEP, NATALY, quant immunoglobulins checked prior to visit. Will also plan for Velcade, zometa on same day. After next week, will follow-up patient back in a month (on 6/20) with CBC, CMP, Mg, Phos,  Uric acid, free light chains, SPEP, NATALY, quant immunoglobulins checked prior to visit. Will also plan for Velcade, zometa on same day.

## 2019-05-21 ENCOUNTER — OFFICE VISIT (OUTPATIENT)
Dept: INTERNAL MEDICINE | Facility: CLINIC | Age: 69
End: 2019-05-21
Payer: MEDICARE

## 2019-05-21 ENCOUNTER — TELEPHONE (OUTPATIENT)
Dept: PHARMACY | Facility: CLINIC | Age: 69
End: 2019-05-21

## 2019-05-21 VITALS
HEIGHT: 63 IN | HEART RATE: 92 BPM | WEIGHT: 166.88 LBS | OXYGEN SATURATION: 96 % | DIASTOLIC BLOOD PRESSURE: 82 MMHG | SYSTOLIC BLOOD PRESSURE: 120 MMHG | BODY MASS INDEX: 29.57 KG/M2

## 2019-05-21 DIAGNOSIS — R53.81 DEBILITY: ICD-10-CM

## 2019-05-21 DIAGNOSIS — D64.9 ACUTE ANEMIA: ICD-10-CM

## 2019-05-21 DIAGNOSIS — C90.00 MULTIPLE MYELOMA NOT HAVING ACHIEVED REMISSION: Primary | ICD-10-CM

## 2019-05-21 DIAGNOSIS — C90.00 METASTATIC MULTIPLE MYELOMA TO BONE: ICD-10-CM

## 2019-05-21 DIAGNOSIS — N17.9 AKI (ACUTE KIDNEY INJURY): ICD-10-CM

## 2019-05-21 DIAGNOSIS — I50.33 ACUTE ON CHRONIC DIASTOLIC HEART FAILURE: ICD-10-CM

## 2019-05-21 PROBLEM — C79.9 MULTIPLE LESIONS OF METASTATIC MALIGNANCY: Status: RESOLVED | Noted: 2019-04-30 | Resolved: 2019-05-21

## 2019-05-21 PROBLEM — J96.01 ACUTE RESPIRATORY FAILURE WITH HYPOXIA: Status: RESOLVED | Noted: 2019-04-30 | Resolved: 2019-05-21

## 2019-05-21 PROCEDURE — 3079F PR MOST RECENT DIASTOLIC BLOOD PRESSURE 80-89 MM HG: ICD-10-PCS | Mod: HCNC,CPTII,S$GLB, | Performed by: INTERNAL MEDICINE

## 2019-05-21 PROCEDURE — 3074F PR MOST RECENT SYSTOLIC BLOOD PRESSURE < 130 MM HG: ICD-10-PCS | Mod: HCNC,CPTII,S$GLB, | Performed by: INTERNAL MEDICINE

## 2019-05-21 PROCEDURE — 1101F PR PT FALLS ASSESS DOC 0-1 FALLS W/OUT INJ PAST YR: ICD-10-PCS | Mod: HCNC,CPTII,S$GLB, | Performed by: INTERNAL MEDICINE

## 2019-05-21 PROCEDURE — 3079F DIAST BP 80-89 MM HG: CPT | Mod: HCNC,CPTII,S$GLB, | Performed by: INTERNAL MEDICINE

## 2019-05-21 PROCEDURE — 3074F SYST BP LT 130 MM HG: CPT | Mod: HCNC,CPTII,S$GLB, | Performed by: INTERNAL MEDICINE

## 2019-05-21 PROCEDURE — 99495 TCM SERVICES (MODERATE COMPLEXITY): ICD-10-PCS | Mod: HCNC,S$GLB,, | Performed by: INTERNAL MEDICINE

## 2019-05-21 PROCEDURE — 1101F PT FALLS ASSESS-DOCD LE1/YR: CPT | Mod: HCNC,CPTII,S$GLB, | Performed by: INTERNAL MEDICINE

## 2019-05-21 PROCEDURE — 99999 PR PBB SHADOW E&M-EST. PATIENT-LVL IV: ICD-10-PCS | Mod: PBBFAC,HCNC,, | Performed by: INTERNAL MEDICINE

## 2019-05-21 PROCEDURE — 99495 TRANSJ CARE MGMT MOD F2F 14D: CPT | Mod: HCNC,S$GLB,, | Performed by: INTERNAL MEDICINE

## 2019-05-21 PROCEDURE — 99999 PR PBB SHADOW E&M-EST. PATIENT-LVL IV: CPT | Mod: PBBFAC,HCNC,, | Performed by: INTERNAL MEDICINE

## 2019-05-21 NOTE — PROGRESS NOTES
Transitional Care Note  Subjective:       Patient ID: Shelby Thompson is a 69 y.o. female.  Chief Complaint: Hospital Follow Up    Family and/or Caretaker present at visit?  Yes.  Diagnostic tests reviewed/disposition: I have reviewed all completed as well as pending diagnostic tests at the time of discharge.  Disease/illness education:    Home health/community services discussion/referrals: Patient does not have home health established from hospital visit.  They do not need home health.  If needed, we will set up home health for the patient.   Establishment or re-establishment of referral orders for community resources: No other necessary community resources.   Discussion with other health care providers: No discussion with other health care providers necessary.   HPI  Review of Systems    Objective:      Physical Exam    Assessment:       1. Multiple myeloma not having achieved remission    2. Metastatic multiple myeloma to bone    3. FUENTES (acute kidney injury)    4. Acute anemia    5. Acute on chronic diastolic heart failure    6. Debility        Plan:       HISTORY OF PRESENT ILLNESS:  Ms. Thompson is a well-known patient of mine, last   saw back in April.  She had a liver workup in the past and for some  reason or   another she is worried about her liver.  She had a liver mass that Hepatology   worked up and she had transaminitis about that time and workup came out   unremarkable, but when I got a CAT scan, it showed multiple spots on her liver.    So while we were waiting for Hepatology to help us work that up, she came back   to see me with hypoxia, acute kidney injury and we made her return to the   hospital where she was given IV fluids.  She was treated for some fluid overload   also.  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  She had a bone marrow done.    Hematology/Oncology  saw her.  She was transfused a unit of blood and she was   taken off her  losartan and hydrochlorothiazide, given Lasix 20 mg a day and when   she was feeling better, she was discharged.  She has dexamethasone and Revlimid   written on her chart, but she has not taken these yet.  She is going to follow   up with Hematology/Oncology on Thursday and she is going to be given Velcade.    Looks like she has bony involvement also and today, we reviewed both her   discharge summary, the hospital course and of course Hematology/Oncology notes   because they followed her since she was discharged and set up her therapy plan.    Her  is with her today and he says she is feeling better at home and   overall is in good spirits.  She has lost some weight, looks like about 10   pounds since April and she is weak and she is requesting a wheelchair.  She does   have a walker with her today.    PHYSICAL EXAMINATION:  GENERAL:  She is a tired appearing 69-year-old female in no acute distress.  NECK:  Supple.  She has no JVD.  Thyroid is not enlarged.  CARDIOVASCULAR:  S1 and S2, regular rate and rhythm.  ABDOMEN:  Soft, nontender, no hepatosplenomegaly.  LOWER EXTREMITIES:  No edema.    ASSESSMENT:  Multiple myeloma, metastatic multiple myeloma to the bone, acute   kidney injury, which has improved quite a bit, hypertension, but she is off the   losartan, her blood pressure is well controlled, acute on chronic heart failure,   improved quite a bit, debility with some nausea.  We are going to write for a   wheelchair.  She is going to see Hematology/Oncology Thursday for start therapy.    We are going to continue her off blood pressure medicine for now.  I will make   a six-month followup.  They do not have home health right now.  I spoke to Mr. Thompson about this and if they need it, she can give me a call, we will get that   set up for her any time.        PIEDAD  dd: 05/21/2019 17:07:49 (CDT)  td: 05/22/2019 04:35:01 (CDT)  Doc ID   #[id number]  Job ID #[id number]    CC:

## 2019-05-22 ENCOUNTER — TELEPHONE (OUTPATIENT)
Dept: INTERNAL MEDICINE | Facility: CLINIC | Age: 69
End: 2019-05-22

## 2019-05-22 NOTE — TELEPHONE ENCOUNTER
----- Message from Tess Mead sent at 5/22/2019 10:20 AM CDT -----  Contact: [phone]  Patient is requesting a call from the office. She is requesting to get an order for a transport wheel chair.    Please advise, thanks

## 2019-05-23 ENCOUNTER — INFUSION (OUTPATIENT)
Dept: INFUSION THERAPY | Facility: HOSPITAL | Age: 69
End: 2019-05-23
Attending: INTERNAL MEDICINE
Payer: MEDICARE

## 2019-05-23 ENCOUNTER — OFFICE VISIT (OUTPATIENT)
Dept: HEMATOLOGY/ONCOLOGY | Facility: CLINIC | Age: 69
End: 2019-05-23
Payer: MEDICARE

## 2019-05-23 VITALS
BODY MASS INDEX: 29.14 KG/M2 | WEIGHT: 164.44 LBS | OXYGEN SATURATION: 95 % | HEIGHT: 63 IN | TEMPERATURE: 99 F | SYSTOLIC BLOOD PRESSURE: 125 MMHG | HEART RATE: 86 BPM | DIASTOLIC BLOOD PRESSURE: 75 MMHG

## 2019-05-23 DIAGNOSIS — C90.00 MULTIPLE MYELOMA NOT HAVING ACHIEVED REMISSION: Primary | ICD-10-CM

## 2019-05-23 PROCEDURE — 3078F PR MOST RECENT DIASTOLIC BLOOD PRESSURE < 80 MM HG: ICD-10-PCS | Mod: HCNC,CPTII,GC,S$GLB | Performed by: STUDENT IN AN ORGANIZED HEALTH CARE EDUCATION/TRAINING PROGRAM

## 2019-05-23 PROCEDURE — 1101F PT FALLS ASSESS-DOCD LE1/YR: CPT | Mod: HCNC,CPTII,GC,S$GLB | Performed by: STUDENT IN AN ORGANIZED HEALTH CARE EDUCATION/TRAINING PROGRAM

## 2019-05-23 PROCEDURE — 99999 PR PBB SHADOW E&M-EST. PATIENT-LVL III: ICD-10-PCS | Mod: PBBFAC,HCNC,GC, | Performed by: STUDENT IN AN ORGANIZED HEALTH CARE EDUCATION/TRAINING PROGRAM

## 2019-05-23 PROCEDURE — 1101F PR PT FALLS ASSESS DOC 0-1 FALLS W/OUT INJ PAST YR: ICD-10-PCS | Mod: HCNC,CPTII,GC,S$GLB | Performed by: STUDENT IN AN ORGANIZED HEALTH CARE EDUCATION/TRAINING PROGRAM

## 2019-05-23 PROCEDURE — 96401 CHEMO ANTI-NEOPL SQ/IM: CPT | Mod: HCNC

## 2019-05-23 PROCEDURE — 3074F PR MOST RECENT SYSTOLIC BLOOD PRESSURE < 130 MM HG: ICD-10-PCS | Mod: HCNC,CPTII,GC,S$GLB | Performed by: STUDENT IN AN ORGANIZED HEALTH CARE EDUCATION/TRAINING PROGRAM

## 2019-05-23 PROCEDURE — 3074F SYST BP LT 130 MM HG: CPT | Mod: HCNC,CPTII,GC,S$GLB | Performed by: STUDENT IN AN ORGANIZED HEALTH CARE EDUCATION/TRAINING PROGRAM

## 2019-05-23 PROCEDURE — 99215 PR OFFICE/OUTPT VISIT, EST, LEVL V, 40-54 MIN: ICD-10-PCS | Mod: HCNC,GC,S$GLB, | Performed by: STUDENT IN AN ORGANIZED HEALTH CARE EDUCATION/TRAINING PROGRAM

## 2019-05-23 PROCEDURE — 99999 PR PBB SHADOW E&M-EST. PATIENT-LVL III: CPT | Mod: PBBFAC,HCNC,GC, | Performed by: STUDENT IN AN ORGANIZED HEALTH CARE EDUCATION/TRAINING PROGRAM

## 2019-05-23 PROCEDURE — 99215 OFFICE O/P EST HI 40 MIN: CPT | Mod: HCNC,GC,S$GLB, | Performed by: STUDENT IN AN ORGANIZED HEALTH CARE EDUCATION/TRAINING PROGRAM

## 2019-05-23 PROCEDURE — 63600175 PHARM REV CODE 636 W HCPCS: Mod: HCNC,JG | Performed by: INTERNAL MEDICINE

## 2019-05-23 PROCEDURE — 3078F DIAST BP <80 MM HG: CPT | Mod: HCNC,CPTII,GC,S$GLB | Performed by: STUDENT IN AN ORGANIZED HEALTH CARE EDUCATION/TRAINING PROGRAM

## 2019-05-23 RX ORDER — BORTEZOMIB 3.5 MG/1
1.3 INJECTION, POWDER, LYOPHILIZED, FOR SOLUTION INTRAVENOUS; SUBCUTANEOUS
Status: CANCELLED | OUTPATIENT
Start: 2019-05-23

## 2019-05-23 RX ORDER — BORTEZOMIB 3.5 MG/1
1.3 INJECTION, POWDER, LYOPHILIZED, FOR SOLUTION INTRAVENOUS; SUBCUTANEOUS
Status: CANCELLED | OUTPATIENT
Start: 2019-05-31

## 2019-05-23 RX ORDER — SODIUM CHLORIDE 0.9 % (FLUSH) 0.9 %
10 SYRINGE (ML) INJECTION
Status: CANCELLED | OUTPATIENT
Start: 2019-05-31

## 2019-05-23 RX ORDER — SODIUM CHLORIDE 0.9 % (FLUSH) 0.9 %
10 SYRINGE (ML) INJECTION
Status: CANCELLED | OUTPATIENT
Start: 2019-05-24

## 2019-05-23 RX ORDER — BORTEZOMIB 3.5 MG/1
1.3 INJECTION, POWDER, LYOPHILIZED, FOR SOLUTION INTRAVENOUS; SUBCUTANEOUS
Status: CANCELLED | OUTPATIENT
Start: 2019-05-24

## 2019-05-23 RX ORDER — BORTEZOMIB 3.5 MG/1
1.3 INJECTION, POWDER, LYOPHILIZED, FOR SOLUTION INTRAVENOUS; SUBCUTANEOUS
Status: COMPLETED | OUTPATIENT
Start: 2019-05-23 | End: 2019-05-23

## 2019-05-23 RX ORDER — HEPARIN 100 UNIT/ML
500 SYRINGE INTRAVENOUS
Status: CANCELLED | OUTPATIENT
Start: 2019-05-31

## 2019-05-23 RX ORDER — HEPARIN 100 UNIT/ML
500 SYRINGE INTRAVENOUS
Status: CANCELLED | OUTPATIENT
Start: 2019-05-24

## 2019-05-23 RX ORDER — HEPARIN 100 UNIT/ML
500 SYRINGE INTRAVENOUS
Status: CANCELLED | OUTPATIENT
Start: 2019-05-23

## 2019-05-23 RX ORDER — SODIUM CHLORIDE 0.9 % (FLUSH) 0.9 %
10 SYRINGE (ML) INJECTION
Status: CANCELLED | OUTPATIENT
Start: 2019-05-23

## 2019-05-23 RX ADMIN — BORTEZOMIB 2.4 MG: 3.5 INJECTION, POWDER, LYOPHILIZED, FOR SOLUTION INTRAVENOUS; SUBCUTANEOUS at 01:05

## 2019-05-23 NOTE — PROGRESS NOTES
PATIENT: Shelby Thompson  MRN: [id number]  DATE: 5/23/2019      Diagnosis:   1. 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 (2008 and 2011, w residual defecitis and weaknessin R side/tremor on R hand), L Breast cancer s/p lumpectomy and radiation for stage 0 DCIS( in 2011, did not do endocrine tx due to CVA at that time), PVcs, DMII (not on current meds, prior A1c of 7.4), HTN, neuropathy of bilateral hands, liver lesions (which are possibly vascular per recent MRI in 05/2019), and hx of iron defeciency anemia, statin intolerance (on a PCSK9 inihibitor, alirocumab or praluent), anxiety/derpression, new onset Heart Failure, who presents to the clinic for further management of her recently diagnosed multiple myeloma. The pt states that she had not known about heart failure until this past admission, when she was note to have an EF of 55% and grade II diastolic dysfunction w elevated pulm artery pressure of 41; she was admitted to the hospital on 4/30/19 - 5/8/19 for an exacerbation of her worsening dyspnea over the prior two to three weeks as well as intermittent memory loss. She was noted to have O2 sats of 87% in her PCP, Dr. Arevalo's office and intiially was sent to the hospital in the evaluation for a PE. A CTA chest which was completed did not reveal any thrombus in pulmonary arteries but did reveal b/l ground glass opacities. At the time of her admission, she complained about pain under L breast and also pain in her L flank. PAtient does not recall if she had lost weight over the past few months but does think that she lost some weight in this last admission. Since leaving the [facility], the patient has been more dependent on a rollator to move around. Also, the patient has had worsening nausea at home without any vomiting.      The patient had anemia to Hgb of 7.1,  corrected calcium of 10.6, decreased renal function with Cr of 2.7, and a new T7 veterbral fracture. She currently has mid/low back pain and L flank pain.  Kappa was noted to be 548, with a k/l ratio of 693.7. Bone marrow biopsy completed on 5/3 revealed a plasma cell population of 70-80% for this patient. She has a PET-CT scheduled for 5/15. Beta2 microlglobulin was noted to be 17.5 and albumin of 2.5. Also, the patient was found to have an IgG kappa monoclonal protein  THe M-protein was noted to be 6.75 g/dL. Urine protein quantification is still pending.  The pt has had numbness /tingling in her hands post CVA, for which she is on gabapentin 300 mg BID.  The patient also had renal failure initially which dropped to 1.4 yesterday; she is s/p four days of dexamethasone x 40 mg.         The patient had an EGD in 2016 which had revealed non-bleeding angioectasia and also a colonosopy in March 2015 which showed a 5 mm hyperplastic polyp. In addition, the patient has been on iron supplements, with ferrous gluconate 324 BID. During recent admission in 05/2019, The pt did receive 1 U of pRBC in the hospital. Her ferritin is 54; the pt has not required prior trasnsfusion and no history of IV iron use.  Also, the patient is still on allopurinol 300 mg for her suspected TLS noted in the hospital. Uric acid on her discharge was 8.2. Her Hgb was 7.8 on day of discharge for this paitnet.    IN the past, she had a referral to Hepatology for elevated liver enzymes and found to have liver lesions and had subsequent US guided liver biopsy in 2015, which did not reveal fibrosis and was otherwise normal in apparenace. The patient had negative serolologic testing for Gino's, alpha-1 antitrypsin defeciency, hemochromatosis, autoimmune hepatitis, hemochromatosis, and viral hepatiitis at that time. MRI abdomen/pelvis completed on 5/1/19 - pt was found to have multiple intedeterminate hepatic lesions and two additional hepatic lesions  from prior ultrasound were suggestive of vascular malformations (CT had revealed hepatic lesions measuring 1.8 cm in hepatic segment 5 and also 1.3 cm in hepatic segment 8). The pt had a splenic lesion, which was noted to be about 2 cm on CT abdomen/pelvis on 4/10/19. Also, the patient had a 1 cm soft tissue lesion anterior to aorta and inferior to 3rd portion of duodenum. She had  thickened endometrium noted on pelvic ultrasound, which was not thought to require any further evaluation given no sx at time, when pt was evaluated by OB/GYN Dr. Antonio.      For the heart failure, the ptis on lasix 20 mg daily for diuresis. A congo red stain was completed on BM biopsy and was negative.   She had COPD and quit smokling in 2011, after smoking about 1/2 pack a day for about 42 years. She does not have any regular use of inhalers and does not keep any at home. She is currently on baby aspirin at home. Pt is edentulous.    The patient is seen with her  and also her daughter, Jessica. Also, the patient has one sister with breast cancer and one sister with lung cancer; there is no hematologic malignancy in the family. She is a retired .         Subjective:   She is still having some nausea this morning and had lost a pound since the last visit; her appetite continues to be poor. The patient has been able to follow-up with use of Zofran when needed.   The patient had been taking lidocaine patches which have helped with her rib wall pain.   She is scheduled to get the revilimid delivered this evening and plans to take it.   The patient did bring her dexamethasone to the clinic visit today.   She does know to take aspirin and acyclovir as well.       Past Medical History:   Past Medical History:   Diagnosis Date    Acute respiratory failure with hypoxia 4/30/2019    FUENTES (acute kidney injury) 5/1/2019    Allergy     Anemia     Aortic aneurysm     Breast cancer 10/2011    left breast Stage 0 DCIS    Chronic  diastolic congestive heart failure 11/6/2015    Colon polyp     GERD (gastroesophageal reflux disease)     History of colonic polyps     HX: breast cancer     Hyperlipemia     Hypertension     ICH (intracerebral hemorrhage)     Major depressive disorder, single episode, mild 6/23/2016    Nuclear sclerosis 7/21/2014    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 7/21/2014    PAD (peripheral artery disease) 11/6/2015    Stroke 4/2011       Past Surgical HIstory:   Past Surgical History:   Procedure Laterality Date    APPENDECTOMY      BIOPSY LIVER AND ULTRASOUND N/A 4/6/2015    Performed by [facility] at [facility] OR 2ND FLR    BREAST BIOPSY Left 10/2011    BREAST LUMPECTOMY Left 2011    DCIS    COLONOSCOPY      COLONOSCOPY N/A 3/26/2015    Performed by Wicho Woodard MD at [facility] ENDO (4TH FLR)    CYST REMOVAL      back    ESOPHAGOGASTRODUODENOSCOPY  07/2016    duodenal angioectasia    ESOPHAGOGASTRODUODENOSCOPY (EGD) N/A 7/27/2016    Performed by Malik Sanchez MD at [facility] ENDO (4TH FLR)    FOOT FRACTURE SURGERY  10/2012    right foot, with R hallux valgus repair    FRACTURE SURGERY Right     broken toe repiar    HEMORRHOID SURGERY      LIVER BIOPSY      TUBAL LIGATION      UPPER GASTROINTESTINAL ENDOSCOPY         Family History:   Family History   Problem Relation Age of Onset    No Known Problems Sister     Cancer Sister 63        Lung Cancer    No Known Problems Mother     Cancer Father     No Known Problems Brother     Hypertension Daughter     Fibroids Daughter         uterine    Hypertension Son     Breast cancer Sister 62    No Known Problems Brother     No Known Problems Maternal Aunt     No Known Problems Maternal Uncle     No Known Problems Paternal Aunt     No Known Problems Paternal Uncle     Hypertension Maternal Grandmother     No Known Problems Maternal Grandfather     No Known Problems Paternal Grandmother     No Known  Problems Paternal Grandfather     Ovarian cancer Neg Hx     Colon cancer Neg Hx     Tremor Neg Hx     Amblyopia Neg Hx     Blindness Neg Hx     Cataracts Neg Hx     Diabetes Neg Hx     Glaucoma Neg Hx     Macular degeneration Neg Hx     Retinal detachment Neg Hx     Strabismus Neg Hx     Stroke Neg Hx     Thyroid disease Neg Hx     Esophageal cancer Neg Hx     Rectal cancer Neg Hx     Stomach cancer Neg Hx     Ulcerative colitis Neg Hx     Crohn's disease Neg Hx     Irritable bowel syndrome Neg Hx     Celiac disease Neg Hx        Social History:  reports that she quit smoking about 8 years ago. Her smoking use included cigarettes. She has a 10.00 pack-year smoking history. She quit smokeless tobacco use about 8 years ago. She reports that she drinks alcohol. She reports that she does not use drugs.    Allergies:  Review of patient's allergies indicates:   Allergen Reactions    Pravastatin Other (See Comments)     Itching, elevated cpk, muscle aches       Medications:  Current Outpatient Medications   Medication Sig Dispense Refill    acetaminophen (TYLENOL) 650 MG TbSR Take 1,300 mg by mouth daily as needed (pain).      acyclovir (ZOVIRAX) 400 MG tablet Take 1 tablet (400 mg total) by mouth 2 (two) times daily. 120 tablet 1    alirocumab (PRALUENT PEN) 75 mg/mL PnIj Inject 1 mL (75 mg total) into the skin every 14 (fourteen) days. Ask your PCP/ Oncologist 2 mL 11    allopurinol (ZYLOPRIM) 300 MG tablet Take 1 tablet (300 mg total) by mouth once daily. 30 tablet 1    amLODIPine (NORVASC) 5 MG tablet TAKE 1 TABLET BY MOUTH EVERY DAY 90 tablet 3    aspirin 81 mg Tab Take 1 tablet by mouth Daily.      bisacodyl (DULCOLAX) 5 mg EC tablet Take 5 mg by mouth once daily.      dexamethasone (DECADRON) 4 MG Tab Take 10 tablets (40 mg total) by mouth once a week. Please take on day of Velcade administration. 80 tablet 0    ferrous gluconate 324 mg (37.5 mg iron) Tab TAKE 1 TABLET BY MOUTH 2 (TWO)  TIMES DAILY WITH MEALS. 180 tablet 0    gabapentin (NEURONTIN) 300 MG capsule TAKE 1 CAPSULE (300 MG TOTAL) BY MOUTH 2 (TWO) TIMES DAILY. 180 capsule 3    lenalidomide (REVLIMID) 10 mg Cap Take 10 mg by mouth once daily Take days 1-21 and then off 7 days.  auth # [id number] on 5/9/19. 21 each 0    lidocaine (LIDODERM) 5 % Place 1 patch onto the skin once daily. Remove & Discard patch within 12 hours or as directed by MD. 30 patch 0    omeprazole (PRILOSEC) 40 MG capsule TAKE 1 CAPSULE (40 MG TOTAL) BY MOUTH ONCE DAILY. 90 capsule 3    ondansetron (ZOFRAN) 4 MG tablet Take 1 tablet (4 mg total) by mouth every 6 (six) hours as needed for Nausea. 30 tablet 1    ondansetron (ZOFRAN-ODT) 8 MG TbDL Take 1 tablet (8 mg total) by mouth every 8 (eight) hours as needed (nausea/vomiting). 21 tablet 0    traMADol (ULTRAM) 50 mg tablet Take 1 tablet (50 mg total) by mouth 2 (two) times daily as needed for Pain. 60 tablet 3    vitamin D 1000 units Tab Take 185 mg by mouth once daily.       Current Facility-Administered Medications   Medication Dose Route Frequency Provider Last Rate Last Dose    zoledronic acid (ZOMETA) 4 mg in sodium chloride 0.9% 100 mL IVPB  4 mg Intravenous 1 time in Clinic/HOD Genny Napoles MD           Review of Systems   Constitutional: Positive for chills, fatigue and unexpected weight change.   Respiratory: Positive for shortness of breath. Negative for cough.    Cardiovascular: Negative for chest pain and palpitations.   Gastrointestinal: Positive for nausea. Negative for abdominal pain and diarrhea.   Genitourinary: Negative for difficulty urinating and dysuria.   Musculoskeletal: Positive for back pain and gait problem.   Skin: Negative for rash.   Neurological: Positive for tremors, weakness and numbness. Negative for headaches.   Hematological: Negative for adenopathy.       ECOG Performance Status: 1   Objective:      Vitals:   Vitals:    05/23/19 1120   BP: 125/75   Pulse: 86   Temp:  "98.5 °F (36.9 °C)   SpO2: 95%   Weight: 74.6 kg (164 lb 7.4 oz)   Height: 5' 3" (1.6 m)     BMI: Body mass index is 29.13 kg/m².    Physical Exam   Constitutional: She is oriented to person, place, and time. She appears well-developed and well-nourished. No distress.   HENT:   Head: Normocephalic and atraumatic.   Mouth/Throat: Oropharynx is clear and moist.   Eyes: Pupils are equal, round, and reactive to light. Conjunctivae and EOM are normal.   Neck: Normal range of motion. Neck supple.   Cardiovascular: Normal rate, regular rhythm and normal heart sounds. Exam reveals no gallop and no friction rub.   No murmur heard.  Pulmonary/Chest: Effort normal and breath sounds normal. No respiratory distress. She has no wheezes. She has no rales.   Abdominal: Soft. Bowel sounds are normal. She exhibits no distension. There is no tenderness. There is no guarding.   Musculoskeletal: Normal range of motion. She exhibits no edema.   Lymphadenopathy:     She has no cervical adenopathy.   Neurological: She is alert and oriented to person, place, and time. No cranial nerve deficit.   Intentional R hand tremor; RUE - 4/5 strength and RLE extremity also 4/5; full strengths on LUE and LLE   Skin: Skin is warm and dry. No rash noted.       Laboratory Data:  Lab Visit on 05/23/2019   Component Date Value Ref Range Status    Protein, Serum 05/23/2019 12.2* 6.0 - 8.4 g/dL Final    Comment: Serum protein electrophoresis and immunofixation results should be   interpreted in clinical context in that some therapeutic agents can   result   in false positive results (example, daratumumab). Correlation with   the   patient s therapeutic regimen is required.      IgG - Serum 05/23/2019 7713* 650 - 1600 mg/dL Final    IgG Cord Blood Reference Range: 650-1600 mg/dL.    IgA 05/23/2019 27* 40 - 350 mg/dL Final    IgA Cord Blood Reference Range: <5 mg/dL.    IgM 05/23/2019 <5* 50 - 300 mg/dL Final    IgM Cord Blood Reference Range: <25 mg/dL. "    WBC 05/23/2019 4.24  3.90 - 12.70 K/uL Final    RBC 05/23/2019 3.34* 4.00 - 5.40 M/uL Final    Hemoglobin 05/23/2019 8.2* 12.0 - 16.0 g/dL Final    Hematocrit 05/23/2019 27.4* 37.0 - 48.5 % Final    Mean Corpuscular Volume 05/23/2019 82  82 - 98 fL Final    Mean Corpuscular Hemoglobin 05/23/2019 24.6* 27.0 - 31.0 pg Final    Mean Corpuscular Hemoglobin Conc 05/23/2019 29.9* 32.0 - 36.0 g/dL Final    RDW 05/23/2019 20.1* 11.5 - 14.5 % Final    Platelets 05/23/2019 216  150 - 350 K/uL Final    MPV 05/23/2019 11.2  9.2 - 12.9 fL Final    Immature Granulocytes 05/23/2019 0.2  0.0 - 0.5 % Final    Gran # (ANC) 05/23/2019 2.7  1.8 - 7.7 K/uL Final    Immature Grans (Abs) 05/23/2019 0.01  0.00 - 0.04 K/uL Final    Comment: Mild elevation in immature granulocytes is non specific and   can be seen in a variety of conditions including stress response,   acute inflammation, trauma and pregnancy. Correlation with other   laboratory and clinical findings is essential.      Lymph # 05/23/2019 1.1  1.0 - 4.8 K/uL Final    Mono # 05/23/2019 0.3  0.3 - 1.0 K/uL Final    Eos # 05/23/2019 0.1  0.0 - 0.5 K/uL Final    Baso # 05/23/2019 0.03  0.00 - 0.20 K/uL Final    nRBC 05/23/2019 0  0 /100 WBC Final    Gran% 05/23/2019 62.6  38.0 - 73.0 % Final    Lymph% 05/23/2019 26.9  18.0 - 48.0 % Final    Mono% 05/23/2019 6.8  4.0 - 15.0 % Final    Eosinophil% 05/23/2019 2.8  0.0 - 8.0 % Final    Basophil% 05/23/2019 0.7  0.0 - 1.9 % Final    Differential Method 05/23/2019 Automated   Final    Sodium 05/23/2019 136  136 - 145 mmol/L Final    Potassium 05/23/2019 3.8  3.5 - 5.1 mmol/L Final    Chloride 05/23/2019 107  95 - 110 mmol/L Final    CO2 05/23/2019 24  23 - 29 mmol/L Final    Glucose 05/23/2019 122* 70 - 110 mg/dL Final    BUN, Bld 05/23/2019 12  8 - 23 mg/dL Final    Creatinine 05/23/2019 1.3  0.5 - 1.4 mg/dL Final    Calcium 05/23/2019 8.8  8.7 - 10.5 mg/dL Final    Total Protein 05/23/2019 12.4*  6.0 - 8.4 g/dL Final    Albumin 05/23/2019 2.7* 3.5 - 5.2 g/dL Final    Total Bilirubin 05/23/2019 0.3  0.1 - 1.0 mg/dL Final    Comment: For infants and newborns, interpretation of results should be based  on gestational age, weight and in agreement with clinical  observations.  Premature Infant recommended reference ranges:  Up to 24 hours.............<8.0 mg/dL  Up to 48 hours............<12.0 mg/dL  3-5 days..................<15.0 mg/dL  6-29 days.................<15.0 mg/dL      Alkaline Phosphatase 05/23/2019 67  55 - 135 U/L Final    AST 05/23/2019 12  10 - 40 U/L Final    ALT 05/23/2019 9* 10 - 44 U/L Final    Anion Gap 05/23/2019 5* 8 - 16 mmol/L Final    eGFR if  05/23/2019 48.0* >60 mL/min/1.73 m^2 Final    eGFR if non  05/23/2019 41.7* >60 mL/min/1.73 m^2 Final    Comment: Calculation used to obtain the estimated glomerular filtration  rate (eGFR) is the CKD-EPI equation.       Magnesium 05/23/2019 1.7  1.6 - 2.6 mg/dL Final    Phosphorus 05/23/2019 4.5  2.7 - 4.5 mg/dL Final    Uric Acid 05/23/2019 3.7  2.4 - 5.7 mg/dL Final         Imaging:   Assessment:       1. Multiple myeloma not having achieved remission           Plan:     Multiple Myeloma  - ECOG PS 1  - pt met most of Crab criteria in hospital - had anemia (hgb 7.1) corrected calcium of 10.6, renal function with Cr of 2.7 and T7 verterbral fracture  - Kappa was noted to be 548, with a k/l ratio of 693.7.  - ISS Stage III based on beta-2 microglobulin of 17.5  - PCPD fish with near-tetraploid  plasma cell clone with IGH/MAFB fusion, t(14;20),  disruption of the MYC gene region, and monosomy 13.  At  diagnosis, this translocation has an unfavorable prognosis in multiple myeloma (Correa et al., Blood 101:1575-6666, 2003).  - 14:20 translocation will place this as R-ISS Stage III, high risk disease  - Bone marrow biopsy completed on 5/3 revealed a plasma cell population of 70-80% for this patient  -   PET-CT on 5/15 results as above with left scapula lesion SUV 3, L5 vertebral body lesion SUV 4.7, stable T7 compression fracture and minimally displaced left lateral 4th rib fracture; no activity in previously noted in splenic or hepatic regions   -SPEP reveals M-protein was noted to be 6.75 g/dL, NATALY reveals IgG kappa monoclonal protein    - Urine protein 24h quantification, 663 mg   - Cr of 1.2 today, s/p four doses of dexamethasone 40 mg, w last dose on 5/9  - CrCl of 48 and so will start patient on 10 mg of revilimid daily, medication arranged through [employer]  - Plan for velcade 1.3 mg/m2 weekly on days 1,8.15.22 for 28 day cycles, also will plan to dose dexamethasone 40 mg weekly as well on days of velcade shots; plan to start first cycle next week; revilimid 10 mg due to decreased renal function  - went over risks of neuropathy, blood clots, VZV reactivation, and GI symptoms such as nausea, diarrhea and pt also asked to call us and/or come to ER for new fevers greater than 100.4F or new onset burising bleeding. Pt agrees to proceed with therapy and has had  sign consent form due to limited dexterity with R hand  - continue acyclovir 400 mg BID, continue aspirin 81 mg daily  - Hgb of 8.2 today, stable  - plan for zometa monthly, no need for dental eval as she has no current known jaw breakdown and is edentulous; will dose at 3.3 mg based on estimated CrCl   - C1D1 of VRD on 5/23     Nausea/ vomiting  - continue zofran at first sign of nausea  - will add secondary medications for additional control too if this becomes worse or uncontrolled      Malnutrition/loss of appetite  - sent message to  about arranging pt with boost/ensure      Anemia  - low ferritin in years past 4-8, currently is 54 on 4/30/19 while on iron supplementation  - prior GI eval in 2015 and 16  - will continue to monitor for worsening anemia and signs sx of bleeding     FUENTES  - 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 Rabago     F/u:  Follow-up patient back in a month (on 6/20) with CBC, CMP, Mg, Phos,  Uric acid, free light chains, SPEP, NATALY, quant immunoglobulins checked prior to visit. She will continue to require chair for weekly Velcade, starting on the day of clinic visit. Patient will also need zometa on that day, gets it monthly.     Genny Napoles MD  Hematology and Oncology Fellow, PGY IV  [facility]    Distress Screening Results: Psychosocial Distress screening score of Distress Score: 6 noted and reviewed. No intervention indicated.

## 2019-05-23 NOTE — NURSING
Velcade injection given without incidence, tolerated well. Education provided, all questions answered, verbalized understanding.  Discharged to home, nad

## 2019-05-27 ENCOUNTER — OFFICE VISIT (OUTPATIENT)
Dept: HEPATOLOGY | Facility: CLINIC | Age: 69
End: 2019-05-27
Payer: MEDICARE

## 2019-05-27 VITALS
SYSTOLIC BLOOD PRESSURE: 117 MMHG | RESPIRATION RATE: 18 BRPM | HEART RATE: 89 BPM | BODY MASS INDEX: 29.49 KG/M2 | OXYGEN SATURATION: 93 % | DIASTOLIC BLOOD PRESSURE: 72 MMHG | HEIGHT: 63 IN | WEIGHT: 166.44 LBS

## 2019-05-27 DIAGNOSIS — C90.00 METASTATIC MULTIPLE MYELOMA TO BONE: ICD-10-CM

## 2019-05-27 DIAGNOSIS — D73.89 SPLENIC LESION: ICD-10-CM

## 2019-05-27 DIAGNOSIS — R59.1 LYMPHADENOPATHY: ICD-10-CM

## 2019-05-27 DIAGNOSIS — C90.00 MULTIPLE MYELOMA NOT HAVING ACHIEVED REMISSION: ICD-10-CM

## 2019-05-27 DIAGNOSIS — R16.0 LIVER MASSES: Primary | ICD-10-CM

## 2019-05-27 DIAGNOSIS — N17.9 AKI (ACUTE KIDNEY INJURY): ICD-10-CM

## 2019-05-27 DIAGNOSIS — Z85.3 PERSONAL HISTORY OF MALIGNANT NEOPLASM OF BREAST: ICD-10-CM

## 2019-05-27 PROCEDURE — 99999 PR PBB SHADOW E&M-EST. PATIENT-LVL IV: ICD-10-PCS | Mod: PBBFAC,HCNC,, | Performed by: NURSE PRACTITIONER

## 2019-05-27 PROCEDURE — 99214 OFFICE O/P EST MOD 30 MIN: CPT | Mod: HCNC,S$GLB,, | Performed by: NURSE PRACTITIONER

## 2019-05-27 PROCEDURE — 3074F PR MOST RECENT SYSTOLIC BLOOD PRESSURE < 130 MM HG: ICD-10-PCS | Mod: HCNC,CPTII,S$GLB, | Performed by: NURSE PRACTITIONER

## 2019-05-27 PROCEDURE — 1101F PR PT FALLS ASSESS DOC 0-1 FALLS W/OUT INJ PAST YR: ICD-10-PCS | Mod: HCNC,CPTII,S$GLB, | Performed by: NURSE PRACTITIONER

## 2019-05-27 PROCEDURE — 1101F PT FALLS ASSESS-DOCD LE1/YR: CPT | Mod: HCNC,CPTII,S$GLB, | Performed by: NURSE PRACTITIONER

## 2019-05-27 PROCEDURE — 3078F PR MOST RECENT DIASTOLIC BLOOD PRESSURE < 80 MM HG: ICD-10-PCS | Mod: HCNC,CPTII,S$GLB, | Performed by: NURSE PRACTITIONER

## 2019-05-27 PROCEDURE — 3074F SYST BP LT 130 MM HG: CPT | Mod: HCNC,CPTII,S$GLB, | Performed by: NURSE PRACTITIONER

## 2019-05-27 PROCEDURE — 99214 PR OFFICE/OUTPT VISIT, EST, LEVL IV, 30-39 MIN: ICD-10-PCS | Mod: HCNC,S$GLB,, | Performed by: NURSE PRACTITIONER

## 2019-05-27 PROCEDURE — 99999 PR PBB SHADOW E&M-EST. PATIENT-LVL IV: CPT | Mod: PBBFAC,HCNC,, | Performed by: NURSE PRACTITIONER

## 2019-05-27 PROCEDURE — 3078F DIAST BP <80 MM HG: CPT | Mod: HCNC,CPTII,S$GLB, | Performed by: NURSE PRACTITIONER

## 2019-05-27 NOTE — LETTER
May 27, 2019      Emanuel Arevalo Jr., MD  [address]  [place] [address]           [facility] - Hepatology  [address]  [facility] [address]  Phone: [phone]  Fax: [phone]          Patient: Shelby Thompson   MR Number: [id number]   YOB: 1950   Date of Visit: 5/27/2019       Dear Dr. Emanuel Arevalo Jr.:    Thank you for referring Shelby Thompson to me for evaluation. Attached you will find relevant portions of my assessment and plan of care.    If you have questions, please do not hesitate to call me. I look forward to following Shelby Thompson along with you.    Sincerely,    Digna Hanson, NP    Enclosure  CC:  No Recipients    If you would like to receive this communication electronically, please contact [email or web address] or [phone] to request more information on [employer] access.    For providers and/or their staff who would like to refer a patient to [facility], please contact us through our one-stop-shop provider referral line, [facility], at [phone].    If you feel you have received this communication in error or would no longer like to receive these types of communications, please e-mail [email or web address]

## 2019-05-27 NOTE — PROGRESS NOTES
[facility] Visit New Patient Note    REFERRING PROVIDER: Dr. Emanuel Arevalo Jr.    CHIEF COMPLAINT: liver masses    HPI: This is a 69 y.o. Black or  female referred for evaluation of liver masses. She has been seen in past by me for elevated liver enzymes and liver mass, last seen 4/2015. Her transaminase elevation in 2015 was found to be due to statin induced rhabdomyositis. Her Lipitor was stopped and her CK and liver enzymes normalized. Her liver mass was evaluated with TPCT and MRI but was indeterminate. Plan was to repeat MRI in 6 months but she was lost to f/u though.     She has recently been diagnosed with multiple myeloma and has innumerable indeterminate enhancing liver lesions seen on u/s, TPCT, and noncontrast MRI. AFP, CEA, CA 19-9, and  all normal. She was unable to have contrasted MRI due to GFR < 30. Her GFR has improved now though. Her PET scan on 5/15/19 did not show any activity in the liver. She also has an enhancing retroperitoneal soft tissue lesion, likely an enlarged retroperitoneal lymph node, concerning for metastasis per TPCT.      Denies jaundice, dark urine, hematemesis, melena, slowed mentation, or lower extremity edema. (+) abd bloating/distention.     Lab Results   Component Value Date    ALT 9 (L) 05/23/2019    AST 12 05/23/2019    ALKPHOS 67 05/23/2019    BILITOT 0.3 05/23/2019    ALBUMIN 2.7 (L) 05/23/2019    INR 1.2 04/30/2019     05/23/2019        Review of patient's allergies indicates:   Allergen Reactions    Lipitor [atorvastatin]      Itching, elevated cpk, muscle aches, statin induced myositis          Current Outpatient Medications on File Prior to Visit   Medication Sig Dispense Refill    acetaminophen (TYLENOL) 650 MG TbSR Take 1,300 mg by mouth daily as needed (pain).      acyclovir (ZOVIRAX) 400 MG tablet Take 1 tablet (400 mg total) by mouth 2 (two) times daily. 120 tablet 1    alirocumab (PRALUENT PEN) 75 mg/mL PnIj  Inject 1 mL (75 mg total) into the skin every 14 (fourteen) days. Ask your PCP/ Oncologist 2 mL 11    allopurinol (ZYLOPRIM) 300 MG tablet Take 1 tablet (300 mg total) by mouth once daily. 30 tablet 1    amLODIPine (NORVASC) 5 MG tablet TAKE 1 TABLET BY MOUTH EVERY DAY 90 tablet 3    aspirin 81 mg Tab Take 1 tablet by mouth Daily.      bisacodyl (DULCOLAX) 5 mg EC tablet Take 5 mg by mouth once daily.      dexamethasone (DECADRON) 4 MG Tab Take 10 tablets (40 mg total) by mouth once a week. Please take on day of Velcade administration. 80 tablet 0    ferrous gluconate 324 mg (37.5 mg iron) Tab TAKE 1 TABLET BY MOUTH 2 (TWO) TIMES DAILY WITH MEALS. 180 tablet 0    gabapentin (NEURONTIN) 300 MG capsule TAKE 1 CAPSULE (300 MG TOTAL) BY MOUTH 2 (TWO) TIMES DAILY. 180 capsule 3    lenalidomide (REVLIMID) 10 mg Cap Take 10 mg by mouth once daily Take days 1-21 and then off 7 days.  auth # [id number] on 5/9/19. 21 each 0    lidocaine (LIDODERM) 5 % Place 1 patch onto the skin once daily. Remove & Discard patch within 12 hours or as directed by MD. 30 patch 0    omeprazole (PRILOSEC) 40 MG capsule TAKE 1 CAPSULE (40 MG TOTAL) BY MOUTH ONCE DAILY. 90 capsule 3    ondansetron (ZOFRAN) 4 MG tablet Take 1 tablet (4 mg total) by mouth every 6 (six) hours as needed for Nausea. 30 tablet 1    ondansetron (ZOFRAN-ODT) 8 MG TbDL Take 1 tablet (8 mg total) by mouth every 8 (eight) hours as needed (nausea/vomiting). 21 tablet 0    traMADol (ULTRAM) 50 mg tablet Take 1 tablet (50 mg total) by mouth 2 (two) times daily as needed for Pain. 60 tablet 3    vitamin D 1000 units Tab Take 185 mg by mouth once daily.       Current Facility-Administered Medications on File Prior to Visit   Medication Dose Route Frequency Provider Last Rate Last Dose    zoledronic acid (ZOMETA) 4 mg in sodium chloride 0.9% 100 mL IVPB  4 mg Intravenous 1 time in Clinic/HOD Genny Napoles MD             PMHX:  has a past medical history of Acute  respiratory failure with hypoxia (2019), FUENTES (acute kidney injury) (2019), Allergy, Anemia, Aortic aneurysm, Breast cancer (10/2011), Chronic diastolic congestive heart failure (2015), Colon polyp, GERD (gastroesophageal reflux disease), History of colonic polyps, breast cancer, Hyperlipemia, Hypertension, ICH (intracerebral hemorrhage), Major depressive disorder, single episode, mild (2016), Nuclear sclerosis (2014), 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    PSHX:  has a past surgical history that includes Appendectomy; Tubal ligation; Hemorrhoid surgery; Foot fracture surgery (10/2012); Colonoscopy; Upper gastrointestinal endoscopy; Liver biopsy; Fracture surgery (Right); Cyst Removal; Esophagogastroduodenoscopy (2016); Breast biopsy (Left, 10/2011); and Breast lumpectomy (Left, ).    FAMILY HISTORY: Negative for liver disease, reviewed in [facility].    SOCIAL HISTORY:   Social History     Tobacco Use   Smoking Status Former Smoker    Packs/day: 0.50    Years: 20.00    Pack years: 10.00    Types: Cigarettes    Last attempt to quit: 2011    Years since quittin.1   Smokeless Tobacco Former User    Quit date: 4/3/2011       Social History     Substance and Sexual Activity   Alcohol Use Yes    Comment: on occasion - one glass wine every 3 months       Social History     Substance and Sexual Activity   Drug Use No         ROS:   GENERAL: Denies fever, chills, (+) weight loss, (+) fatigue  HEENT: Denies headaches, dizziness, vision/hearing changes  CARDIOVASCULAR: Denies chest pain, palpitations, or edema  RESPIRATORY: (+) dyspnea, no cough  GI: Denies abdominal pain, rectal bleeding, (+) nausea, no vomiting. No change in bowel pattern or color, (+) decreased appetite  : Denies dysuria, hematuria   SKIN: Denies rash, itching   NEURO: Denies confusion, memory loss, or  "mood changes  PSYCH: Denies depression or anxiety  HEME/LYMPH: Denies easy bruising or bleeding  MSK: Denies joint pain or myalgia.     PHYSICAL EXAM:   Friendly Black or  female, in no acute distress; alert and oriented to person, place and time  VITALS: /72 (BP Location: Right arm, Patient Position: Sitting, BP Method: Medium (Automatic))   Pulse 89   Resp 18   Ht 5' 3" (1.6 m)   Wt 75.5 kg (166 lb 7.2 oz)   LMP  (LMP Unknown)   SpO2 (!) 93%   BMI 29.48 kg/m²    HENT: Normocephalic, without obvious abnormality. Oral mucosa pink and moist.   EYES: Sclerae anicteric.   NECK: Supple. No masses or cervical adenopathy.  CARDIOVASCULAR: Regular rate and rhythm. No murmurs.  RESPIRATORY: Normal respiratory effort. BBS CTA. No wheezes or crackles.  GI: Soft, non-tender, non-distended. No palpable hepatosplenomegaly. No masses palpable. No ascites.  EXTREMITIES:  No clubbing, cyanosis or edema.  SKIN: Warm and dry. No jaundice. No rashes noted to exposed skin. No telangectasias noted. No palmar erythema.  NEURO:  Impaired gait. No asterixis.  PSYCH:  Memory intact. Thought and speech pattern appropriate. Behavior normal. No depression or anxiety noted.      LABS:  Lab Results   Component Value Date    WBC 4.24 05/23/2019    HGB 8.2 (L) 05/23/2019    HCT 27.4 (L) 05/23/2019     05/23/2019     05/23/2019    K 3.8 05/23/2019    CREATININE 1.3 05/23/2019    ALT 9 (L) 05/23/2019    AST 12 05/23/2019    ALKPHOS 67 05/23/2019    BILITOT 0.3 05/23/2019    BILIDIR 0.2 04/17/2015    ALBUMIN 2.7 (L) 05/23/2019    INR 1.2 04/30/2019    AFP 1.5 05/01/2019    CHOL 150 09/11/2018    TRIG 137 09/11/2018    LDLCALC 79.6 09/11/2018    HGBA1C 7.4 (H) 03/27/2019       Hepatitis A Antibody IgG   Date Value Ref Range Status   04/17/2015 Positive (A)  Final       Hepatitis B Surface Ag   Date Value Ref Range Status   05/02/2019 Negative  Final   05/02/2019 Negative  Final     Hep B Core Total Ab   Date " Value Ref Range Status   05/02/2019 Negative  Final     Hep B S Ab   Date Value Ref Range Status   03/16/2015 Negative  Final     Hepatitis C Ab   Date Value Ref Range Status   05/02/2019 Negative  Final   05/02/2019 Negative  Final     Immunization History   Administered Date(s) Administered    Influenza 10/02/2018    Influenza - High Dose 11/24/2014, 11/06/2015, 10/22/2016, 10/25/2017, 10/10/2018    Pneumococcal Conjugate - 13 Valent 11/06/2015    Pneumococcal Polysaccharide - 23 Valent 07/21/2014    Tdap 06/23/2016          DIAGNOSTIC STUDIES:  ABD. U/S- 4/4/19  FINDINGS:  Pancreas: The visualized portions of pancreas appear normal.  Peripancreatic lymph node measuring 1.8 x 1.3 x 0.7 cm.    Aorta: No aneurysm.    Liver: 15.7 cm, normal in size. Interval development innumerable mixed echogenicity lesions in both lobes concerning for metastatic disease.  The largest 2 lesions in the right measuring 1.5 x 1.2 x 1.4 cm and 1.7 x 1.3 x 1.4 cm.  The largest lesions in the left lobe measure 1.1 x 0.8 x 1 cm, 0.8 x 1.2 x 1.2 cm and 0.9 x 0.8 x 1 cm.  Two areas of vascular lesions which demonstrate venous waveforms likely venous to venous fistula.    The previously noted hemangioma is not definitely visualized on today's examination    Gallbladder: Echogenic focus within the gallbladder measuring 3 mm likely stone.  No wall thickening, or pericholecystic fluid.  Negative sonographic Dewey's sign.    Biliary system: 3 mm common bile duct.  No intrahepatic ductal dilatation.    Inferior vena cava: Normal in appearance.    Right kidney: 9.2 cm. No hydronephrosis.    Left kidney: 9.4 cm. No hydronephrosis.    Spleen: 8.6 x 3.8 cm.  Normal in size with homogeneous echotexture.    Miscellaneous: No ascites.      Impression       Innumerable new hepatic lesions concerning for metastatic disease.  Further evaluation with contrast-enhanced CT is recommended.    Two areas of vascular lesions, likely  fistula.    Cholelithiasis with no evidence of acute cholecystitis.    1.8 cm peripancreatic lymph node.       TPCT- 4/10/19  FINDINGS:  Heart: Normal in size. No pericardial effusion.    Lung Bases: Symmetrically expanded.  Mild dependent peripheral ground-glass and reticulation.  Mild bibasilar atelectatic changes. No focal consolidation, mass, or significant pleural fluid.    Liver: Normal in size. Innumerable early arterial enhancing lesions scattered throughout both hepatic lobes, several of which persist on delayed phases.  No definitive washout. Index lesion in hepatic segment V measures 1.8 cm (axial series 2, image 50).  Index lesion in hepatic segment VIII measures 1.3 cm (axial series 2, image 23).  Vague hypoattenuating lesion identified in hepatic segment VI (axial series 3, image 55). Portal vein is patent.    Gallbladder: No calcified gallstones.    Bile Ducts: No evidence of dilated ducts.    Pancreas: No mass or peripancreatic fat stranding.    Spleen: Early arterial enhancing lesion identified measuring 2.0 cm (axial series 2, image 28).    Adrenals: Unremarkable.    Kidneys/ Ureters: Normal in size and location. Normal concentration and excretion of contrast. No hydronephrosis or nephrolithiasis. No ureteral dilatation.    Bladder: No evidence of wall thickening.    Reproductive organs: Thickened endometrium.  Enlarged uterus with scattered calcifications and heterogeneous enhancement, findings could represent underlying fibroids.    GI Tract/Mesentery: No evidence of bowel obstruction or inflammation.    Peritoneal Space: No ascites. No free air.    Retroperitoneum:  1.0 cm enhancing soft tissue lesion anterior to the aorta and just inferior to the 3rd portion of the duodenum (axial series 2, image 69).    Abdominal wall:  Small fat containing umbilical hernia.    Vasculature: Descending thoracic aorta appears ectatic with penetrating ulcer at the level of the hiatus (axial series 3, image 23)  moderate atherosclerosis without aneurysm.    Bones: No acute fracture. Age-appropriate degenerative changes.  No aggressive osseous lesions.      Impression       Innumerable indeterminate enhancing lesions identified throughout the liver.  No definitive washout on delayed phases.  Findings are indeterminate by CT criteria, metastatic disease is not excluded.  Recommend further evaluation with MRI liver mass protocol.    Enhancing retroperitoneal soft tissue lesion, likely an enlarged retroperitoneal lymph node.  Findings are concerning for metastasis in the setting of occult malignancy.    Thickened endometrium, underlying neoplasm cannot.  Recommend further evaluation with pelvic ultrasound or MRI.    Indeterminate ill-defined hypoattenuating lesion in hepatic segment VI.    Indeterminate arterial enhancing lesion in the spleen.       MRI- 4/30/19  FINDINGS:  Lung bases: Unremarkable.    Liver: The liver is normal in size.  Multiple T2 hyperintense lesions, some of which demonstrate diffusion restriction, throughout the liver corresponding to and better characterized on previous contrast enhanced CT examination.  Evaluation these lesions is limited without the use of intravenous contrast.  Lesions appear similar in size and distribution from prior CT examination.  Two separate T2 hyperintense lesions demonstrating tubular, branching morphology within the left hepatic lobe within segment 2 and right hepatic lobe within segment 7 compatible with vascular malformations, similar prior ultrasound.    Bile Ducts: Unremarkable.    Gallbladder: Unremarkable.    Pancreas: Unremarkable    Spleen: Diffusion hyperintense lesion within the spleen, corresponding to enhancing lesion on prior CT examination, concerning for metastasis.    Gastrointestinal tract: Unremarkable    Mesentery: Unremarkable    Adrenal Glands: Unremarkable    Kidneys/ureters: Unremarkable    Aorta and Abdominal Vasculature: Aneurysmal dilatation of the  proximal abdominal aorta, similar to prior CT, with associated mural thrombus.    Lymph nodes: Previously visualized enhancing retroperitoneal lesion is not well visualized with noncontrast MR technique and likely represents a enlarged lymph node.  Additional previously visualized small retro peritoneal/periaortic lymph nodes are also not evaluated due to artifact.    Pelvis: Persistent endometrial thickening, similar to prior CT examination.  Probable fibroid within the anterior myometrium measuring up to 3.9 cm.  Urinary bladder is unremarkable.    Body wall: Unremarkable.    Other: Unremarkable.      Impression       1. Multiple small indeterminate hepatic lesions, correlating with the 04/10/2019 CT examination, noting limited evaluation without IV contrast.    2. Two additional separate hepatic lesions with tubular/ branching morphology, suggestive of vascular malformations, correlating with 4/4/19 ultrasound.    3. Additional indeterminate splenic lesion noted, corresponding to enhancing lesion on prior CT.    4.  Thickened endometrium, similar to prior CT examination.  This could be further evaluated with pelvic ultrasound.    5.  Stable aneurysmal dilatation of the abdominal aorta.       LIVER BIOPSY- 4/6/15  FINAL PATHOLOGIC DIAGNOSIS  LIVER (NEEDLE BIOPSY)  -Very minimal lobular disarray/injury  -Rare macrosteatosis, less than 2%  -No iron  -No fibrosis  Supplemental Diagnosis  This addendum is issued for the results of ancillary studies.  PASD is negative. Congo red stain is negative for amyloid.  Keratin 7 is positive in bile ducts with no alterations (normal pattern staining).  All immunostain and special stain controls reacted appropriately.      ASSESSMENT:  69 y.o. Black or  female with:  1. Liver masses, indeterminate on TPCT  -- in setting of recently diagnosed multiple myeloma, lesions concerning for metastatic disease but no activity seen on recent PET scan  -- can obtain MRI w/wo  contrast now that GFR is > 30 again  -- AFP, CEA, CA 19-9,  all WNL  2. Multiple myeloma with mets to bone  -- started chemotherapy last week  3. H/o breast cancer  4. Splenic lesion  5. Enhancing retroperitoneal soft tissue lesion, likely an enlarged retroperitoneal lymph node.  Findings are concerning for metastasis per TPCT  6. FUENTES, improved        PLAN:  1. MRI abd w/wo contrast  2. IR conference review after MRI done  3. F/u pending IR conference recommendations      Thank you for allowing me to participate in the care of Shelby Hanson, FNP-C  [facility]

## 2019-05-27 NOTE — PROGRESS NOTES
I have personally performed a face to face diagnostic evaluation on this patient. I have reviewed and agree with today's findings and the care plan outlined by Digna Hanson NP with following comments:    Ms. Thompson is a very pleasant 69 year old lady with no hx of liver disease, who was recently diagnosed with metastatic multiple myeloma. She has been treated with lenlidomide and bortezomib. She was found to have multiple liver lesions. Agree with obtaining triple phase MRI and review at multi-disciplinary Liver Imaging Conference.      The patient will return to Digna Hanson NP  for follow-up.     Mireya Perez MD   Hepatology  [facility]

## 2019-05-28 ENCOUNTER — TELEPHONE (OUTPATIENT)
Dept: HEPATOLOGY | Facility: CLINIC | Age: 69
End: 2019-05-28

## 2019-05-28 NOTE — TELEPHONE ENCOUNTER
Patient: Shelby Thompson       MRN: [id number]      : 1950     Age: 69 y.o.  [address] [facility] [address]    Provider: CHRISTY - Digna Hanson NP/Dr. Perez    Urgency of review: non-urgent    Patient Transplant Status: Not a candidate    Reason for presentation: Indeterminate lesion    Clinical Summary: 69 year old lady with no hx of liver disease, who was recently diagnosed with metastatic multiple myeloma, being treated with lenlidomide and bortezomib. She was found to have multiple liver lesions on u/s, TPCT. Having MRI with contrast . She also has an enhancing retroperitoneal soft tissue lesion, likely an enlarged retroperitoneal lymph node, concerning for metastasis per TPCT.     Her PET scan on 5/15/19 did not show any activity in the liver.     AFP, CEA, CA 19-9, and  all normal    Imaging to be reviewed: u/s 19, TPCT 4/10/19, MRI 19, PET 5/15/19    HCC Treatment History: n/a    ABO: O POS    Platelets:   Lab Results   Component Value Date/Time     2019 09:33 AM     Creatinine:   Lab Results   Component Value Date/Time    CREATININE 1.3 2019 09:33 AM     Bilirubin:   Lab Results   Component Value Date/Time    BILITOT 0.3 2019 09:33 AM     AFP Last 3 each if available:   Lab Results   Component Value Date/Time    AFP 1.5 2019 03:42 AM    AFP 2.0 2015 09:15 AM       MELD: MELD-Na score: 19 at 2019  5:42 AM  MELD score: 18 at 2019  5:42 AM  Calculated from:  Serum Creatinine: 2.7 mg/dL at 2019  5:42 AM  Serum Sodium: 135 mmol/L at 2019  5:42 AM  Total Bilirubin: 0.3 mg/dL (Rounded to 1 mg/dL) at 2019  5:42 AM  INR(ratio): 1.2 at 2019 10:25 AM  Age: 69 years    Plan:     Follow-up Provider:

## 2019-05-30 ENCOUNTER — INFUSION (OUTPATIENT)
Dept: INFUSION THERAPY | Facility: HOSPITAL | Age: 69
End: 2019-05-30
Attending: INTERNAL MEDICINE
Payer: MEDICARE

## 2019-05-30 VITALS
RESPIRATION RATE: 18 BRPM | DIASTOLIC BLOOD PRESSURE: 77 MMHG | WEIGHT: 166.44 LBS | HEART RATE: 84 BPM | HEIGHT: 63 IN | SYSTOLIC BLOOD PRESSURE: 125 MMHG | BODY MASS INDEX: 29.49 KG/M2

## 2019-05-30 DIAGNOSIS — C90.00 MULTIPLE MYELOMA NOT HAVING ACHIEVED REMISSION: Primary | ICD-10-CM

## 2019-05-30 PROCEDURE — 96401 CHEMO ANTI-NEOPL SQ/IM: CPT | Mod: HCNC

## 2019-05-30 PROCEDURE — 63600175 PHARM REV CODE 636 W HCPCS: Mod: HCNC,JG | Performed by: INTERNAL MEDICINE

## 2019-05-30 RX ORDER — BORTEZOMIB 3.5 MG/1
1.3 INJECTION, POWDER, LYOPHILIZED, FOR SOLUTION INTRAVENOUS; SUBCUTANEOUS
Status: COMPLETED | OUTPATIENT
Start: 2019-05-30 | End: 2019-05-30

## 2019-05-30 RX ADMIN — BORTEZOMIB 2.4 MG: 3.5 INJECTION, POWDER, LYOPHILIZED, FOR SOLUTION INTRAVENOUS; SUBCUTANEOUS at 02:05

## 2019-05-31 ENCOUNTER — HOSPITAL ENCOUNTER (OUTPATIENT)
Dept: RADIOLOGY | Facility: HOSPITAL | Age: 69
Discharge: HOME | End: 2019-05-31
Attending: NURSE PRACTITIONER
Payer: MEDICARE

## 2019-05-31 ENCOUNTER — PATIENT MESSAGE (OUTPATIENT)
Dept: HEPATOLOGY | Facility: CLINIC | Age: 69
End: 2019-05-31

## 2019-05-31 DIAGNOSIS — R16.0 LIVER MASSES: ICD-10-CM

## 2019-05-31 DIAGNOSIS — C90.00 METASTATIC MULTIPLE MYELOMA TO BONE: ICD-10-CM

## 2019-05-31 PROCEDURE — 74183 MRI ABD W/O CNTR FLWD CNTR: CPT | Mod: TC,HCNC

## 2019-05-31 PROCEDURE — 74183 MRI ABDOMEN W WO CONTRAST: ICD-10-PCS | Mod: 26,HCNC,, | Performed by: RADIOLOGY

## 2019-05-31 PROCEDURE — 25500020 PHARM REV CODE 255: Mod: HCNC | Performed by: NURSE PRACTITIONER

## 2019-05-31 PROCEDURE — A9585 GADOBUTROL INJECTION: HCPCS | Mod: HCNC | Performed by: NURSE PRACTITIONER

## 2019-05-31 PROCEDURE — 74183 MRI ABD W/O CNTR FLWD CNTR: CPT | Mod: 26,HCNC,, | Performed by: RADIOLOGY

## 2019-05-31 RX ORDER — GADOBUTROL 604.72 MG/ML
10 INJECTION INTRAVENOUS
Status: COMPLETED | OUTPATIENT
Start: 2019-05-31 | End: 2019-05-31

## 2019-05-31 RX ADMIN — GADOBUTROL 10 ML: 604.72 INJECTION INTRAVENOUS at 12:05

## 2019-06-04 ENCOUNTER — CONFERENCE (OUTPATIENT)
Dept: TRANSPLANT | Facility: CLINIC | Age: 69
End: 2019-06-04

## 2019-06-04 ENCOUNTER — TELEPHONE (OUTPATIENT)
Dept: HEPATOLOGY | Facility: CLINIC | Age: 69
End: 2019-06-04

## 2019-06-04 NOTE — TELEPHONE ENCOUNTER
Spoke with pt regarding radiology conference recommendations. Will await plan from oncology when she sees them on 6/20 and arrange imaging if needed. Pt verbalized understanding.     Plan: Multiple indeterminate lesions again noted throughout the liver - continue monitoring, IF STAGING CT PLANNED IN 3 MONTHS PER ONCOLOGY, THEN THAT IS SUFFICIENT. OTHERWISE, MRI PREFERRED AS IMAGING MODALITY FOR THE LIVER

## 2019-06-05 ENCOUNTER — PATIENT MESSAGE (OUTPATIENT)
Dept: HEMATOLOGY/ONCOLOGY | Facility: CLINIC | Age: 69
End: 2019-06-05

## 2019-06-05 DIAGNOSIS — C90.00 MULTIPLE MYELOMA NOT HAVING ACHIEVED REMISSION: ICD-10-CM

## 2019-06-05 RX ORDER — LENALIDOMIDE 10 MG/1
10 CAPSULE ORAL DAILY
Qty: 21 EACH | Refills: 0 | Status: SHIPPED | OUTPATIENT
Start: 2019-06-05 | End: 2019-07-03 | Stop reason: SDUPTHER

## 2019-06-06 ENCOUNTER — INFUSION (OUTPATIENT)
Dept: INFUSION THERAPY | Facility: HOSPITAL | Age: 69
End: 2019-06-06
Attending: INTERNAL MEDICINE
Payer: MEDICARE

## 2019-06-06 VITALS — SYSTOLIC BLOOD PRESSURE: 118 MMHG | HEART RATE: 83 BPM | DIASTOLIC BLOOD PRESSURE: 63 MMHG | RESPIRATION RATE: 18 BRPM

## 2019-06-06 DIAGNOSIS — C90.00 MULTIPLE MYELOMA NOT HAVING ACHIEVED REMISSION: Primary | ICD-10-CM

## 2019-06-06 PROCEDURE — 96401 CHEMO ANTI-NEOPL SQ/IM: CPT | Mod: HCNC

## 2019-06-06 PROCEDURE — 63600175 PHARM REV CODE 636 W HCPCS: Mod: HCNC,JG | Performed by: INTERNAL MEDICINE

## 2019-06-06 RX ORDER — BORTEZOMIB 3.5 MG/1
1.3 INJECTION, POWDER, LYOPHILIZED, FOR SOLUTION INTRAVENOUS; SUBCUTANEOUS
Status: COMPLETED | OUTPATIENT
Start: 2019-06-06 | End: 2019-06-06

## 2019-06-06 RX ADMIN — BORTEZOMIB 2.4 MG: 3.5 INJECTION, POWDER, LYOPHILIZED, FOR SOLUTION INTRAVENOUS; SUBCUTANEOUS at 02:06

## 2019-06-08 DIAGNOSIS — C90.00 MULTIPLE MYELOMA NOT HAVING ACHIEVED REMISSION: ICD-10-CM

## 2019-06-09 RX ORDER — SERTRALINE HYDROCHLORIDE 50 MG/1
TABLET, FILM COATED ORAL
Qty: 90 TABLET | Refills: 3 | Status: SHIPPED | OUTPATIENT
Start: 2019-06-09 | End: 2020-06-04 | Stop reason: SDUPTHER

## 2019-06-10 RX ORDER — LENALIDOMIDE 10 MG/1
CAPSULE ORAL
Refills: 0 | OUTPATIENT
Start: 2019-06-10

## 2019-06-11 DIAGNOSIS — E11.9 TYPE 2 DIABETES MELLITUS WITHOUT COMPLICATION: ICD-10-CM

## 2019-06-13 ENCOUNTER — INFUSION (OUTPATIENT)
Dept: INFUSION THERAPY | Facility: HOSPITAL | Age: 69
End: 2019-06-13
Attending: STUDENT IN AN ORGANIZED HEALTH CARE EDUCATION/TRAINING PROGRAM
Payer: MEDICARE

## 2019-06-13 VITALS — WEIGHT: 166.44 LBS | BODY MASS INDEX: 29.49 KG/M2 | HEIGHT: 63 IN

## 2019-06-13 DIAGNOSIS — C90.00 MULTIPLE MYELOMA NOT HAVING ACHIEVED REMISSION: Primary | ICD-10-CM

## 2019-06-13 PROCEDURE — 63600175 PHARM REV CODE 636 W HCPCS: Mod: HCNC,JG | Performed by: INTERNAL MEDICINE

## 2019-06-13 PROCEDURE — 96401 CHEMO ANTI-NEOPL SQ/IM: CPT | Mod: HCNC

## 2019-06-13 RX ORDER — SODIUM CHLORIDE 0.9 % (FLUSH) 0.9 %
10 SYRINGE (ML) INJECTION
Status: CANCELLED | OUTPATIENT
Start: 2019-06-14

## 2019-06-13 RX ORDER — BORTEZOMIB 3.5 MG/1
1.3 INJECTION, POWDER, LYOPHILIZED, FOR SOLUTION INTRAVENOUS; SUBCUTANEOUS
Status: COMPLETED | OUTPATIENT
Start: 2019-06-13 | End: 2019-06-13

## 2019-06-13 RX ORDER — HEPARIN 100 UNIT/ML
500 SYRINGE INTRAVENOUS
Status: CANCELLED | OUTPATIENT
Start: 2019-06-14

## 2019-06-13 RX ORDER — BORTEZOMIB 3.5 MG/1
1.3 INJECTION, POWDER, LYOPHILIZED, FOR SOLUTION INTRAVENOUS; SUBCUTANEOUS
Status: CANCELLED | OUTPATIENT
Start: 2019-06-14

## 2019-06-13 RX ADMIN — BORTEZOMIB 2.4 MG: 3.5 INJECTION, POWDER, LYOPHILIZED, FOR SOLUTION INTRAVENOUS; SUBCUTANEOUS at 02:06

## 2019-06-19 NOTE — PROGRESS NOTES
PATIENT: Shelby Thompson  MRN: [id number]  DATE: 6/20/2019      Diagnosis:   1. Multiple myeloma not having achieved remission        Chief Complaint: Follow-up visit    Multiple Myeloma- presented w CRAB criteria (Hgb 7.1, hypercalcemia, renal function - Cr of 2.7, T7 vertebral fracture)  Kappa, RISS Stage III (beta-2 micro globulin of 17.5 and 14:20 translocation) High Risk disease   M-protein was 6.75, urine 600 mg of proteinbiopsy on 5/3 showed 70-80% plasma cells; k/l of 693.7, k of 548  CT on 5/15 results as above with left scapula lesion SUV 3, L5 vertebral body lesion SUV 4.7, stable T7 compression fracture and minimally displaced left lateral 4th rib fracture; no activity in previously noted in splenic or hepatic regions    VRD C1 completed on 5/23 - 6/13;l CrCl of 48 and so revilimid 10 mg; pt on acyclovir and aspirin 81, zometa-given on 5/.10 edentulous  Start C2 on 6/20    2 prior CVAs (one in 2008, one in 2011)  L breast cancer DCIS stage 0 (s/p lumpectomy and radiation, no endocrine tx due to CVA)  HTN  DM II  Neuropathy, b/l hands  Prior smoker, quit in 2011  Hepatic lesions - vascular per recent MRI in 05/2019; recs per hepatology conference to scans q3 mo anIntolerance   Statin Intolerance - on praluent, PCSK9 inhibitor   HFpEF - EF 55%, diastolic dysfunction  Anxiety/Depression      Oncologic History:   Pt is a 70 yo AAF with PMHx of COPD, two prior CVAs -one hemorhagic and one ishcemic (2008 and 2011, w residual defecitis and weaknessin R side/tremor on R hand), L Breast cancer s/p lumpectomy and radiation for stage 0 DCIS( in 2011, did not do endocrine tx due to CVA at that time), PVcs, DMII (not on current meds, prior A1c of 7.4), HTN, neuropathy of bilateral hands, liver lesions (which are possibly vascular per recent MRI in 05/2019), and hx of iron defeciency anemia, statin intolerance (on a PCSK9 inihibitor, alirocumab or praluent), anxiety/derpression, new onset Heart Failure, who presents to  the clinic for further management of her recently diagnosed multiple myeloma. The pt states that she had not known about heart failure until this past admission, when she was note to have an EF of 55% and grade II diastolic dysfunction w elevated pulm artery pressure of 41; she was admitted to the hospital on 4/30/19 - 5/8/19 for an exacerbation of her worsening dyspnea over the prior two to three weeks as well as intermittent memory loss. She was noted to have O2 sats of 87% in her PCP, Dr. Arevalo's office and intiially was sent to the hospital in the evaluation for a PE. A CTA chest which was completed did not reveal any thrombus in pulmonary arteries but did reveal b/l ground glass opacities. At the time of her admission, she complained about pain under L breast and also pain in her L flank. PAtient does not recall if she had lost weight over the past few months but does think that she lost some weight in this last admission. Since leaving the [facility], the patient has been more dependent on a rollator to move around. Also, the patient has had worsening nausea at home without any vomiting.      The patient had anemia to Hgb of 7.1, corrected calcium of 10.6, decreased renal function with Cr of 2.7, and a new T7 veterbral fracture. She currently has mid/low back pain and L flank pain.  Kappa was noted to be 548, with a k/l ratio of 693.7. Bone marrow biopsy completed on 5/3 revealed a plasma cell population of 70-80% for this patient. She has a PET-CT scheduled for 5/15. Beta2 microlglobulin was noted to be 17.5 and albumin of 2.5. Also, the patient was found to have an IgG kappa monoclonal protein  THe M-protein was noted to be 6.75 g/dL. Urine protein quantification is still pending.  The pt has had numbness /tingling in her hands post CVA, for which she is on gabapentin 300 mg BID.  The patient also had renal failure initially which dropped to 1.4 yesterday; she is s/p four days of dexamethasone x 40 mg.          The patient had an EGD in 2016 which had revealed non-bleeding angioectasia and also a colonosopy in March 2015 which showed a 5 mm hyperplastic polyp. In addition, the patient has been on iron supplements, with ferrous gluconate 324 BID. During recent admission in 05/2019, The pt did receive 1 U of pRBC in the hospital. Her ferritin is 54; the pt has not required prior trasnsfusion and no history of IV iron use.  Also, the patient is still on allopurinol 300 mg for her suspected TLS noted in the hospital. Uric acid on her discharge was 8.2. Her Hgb was 7.8 on day of discharge for this paitnet.    IN the past, she had a referral to Hepatology for elevated liver enzymes and found to have liver lesions and had subsequent US guided liver biopsy in 2015, which did not reveal fibrosis and was otherwise normal in apparenace. The patient had negative serolologic testing for Gino's, alpha-1 antitrypsin defeciency, hemochromatosis, autoimmune hepatitis, hemochromatosis, and viral hepatiitis at that time. MRI abdomen/pelvis completed on 5/1/19 - pt was found to have multiple intedeterminate hepatic lesions and two additional hepatic lesions from prior ultrasound were suggestive of vascular malformations (CT had revealed hepatic lesions measuring 1.8 cm in hepatic segment 5 and also 1.3 cm in hepatic segment 8). The pt had a splenic lesion, which was noted to be about 2 cm on CT abdomen/pelvis on 4/10/19. Also, the patient had a 1 cm soft tissue lesion anterior to aorta and inferior to 3rd portion of duodenum. She had  thickened endometrium noted on pelvic ultrasound, which was not thought to require any further evaluation given no sx at time, when pt was evaluated by OB/GYN Dr. Teran.      For the heart failure, the ptis on lasix 20 mg daily for diuresis. A congo red stain was completed on BM biopsy and was negative.   She had COPD and quit smokling in 2011, after smoking about 1/2 pack a day for about 42 years.  She does not have any regular use of inhalers and does not keep any at home. She is currently on baby aspirin at home. Pt is edentulous.    The patient is seen with her  and also her daughter, Jessica. Also, the patient has one sister with breast cancer and one sister with lung cancer; there is no hematologic malignancy in the family. She is a retired .       Subjective:   The pt had increased frequency of stools, up to 5/day on day of Velcade; she felt some relief due to this. She has had prior neuropathy, worse in R hand but she feels that this has been very stable. Over past week, she has had bilateral ankle swelling; she has been using compression stockings and trying to stay off her feet more.  She had last received zometa on 5/10, told family about plans to add on a dose for today.  She was not aware that acyclovir was available previously to her and so asked her to pick this up today from pharmacy, take 400 mg twice daily.  Patient has been nauseated but without vomiting, taking 2 x daily currently.    Past Medical History:   Past Medical History:   Diagnosis Date    Acute respiratory failure with hypoxia 4/30/2019    FUENTES (acute kidney injury) 5/1/2019    Allergy     Anemia     Aortic aneurysm     Breast cancer 10/2011    left breast Stage 0 DCIS    Chronic diastolic congestive heart failure 11/6/2015    Colon polyp     GERD (gastroesophageal reflux disease)     History of colonic polyps     HX: breast cancer     Hyperlipemia     Hypertension     ICH (intracerebral hemorrhage)     Major depressive disorder, single episode, mild 6/23/2016    Nuclear sclerosis 7/21/2014    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 7/21/2014    PAD (peripheral artery disease) 11/6/2015    Statin-induced rhabdomyolysis     4/2015     Stroke 4/2011       Past Surgical HIstory:   Past Surgical History:   Procedure Laterality Date    APPENDECTOMY      BIOPSY LIVER AND  ULTRASOUND N/A 4/6/2015    Performed by [facility] Diagnostic Provider at [facility] OR 2ND FLR    BREAST BIOPSY Left 10/2011    BREAST LUMPECTOMY Left 2011    DCIS    COLONOSCOPY      COLONOSCOPY N/A 3/26/2015    Performed by Wicho Woodard MD at [facility] ENDO (4TH FLR)    CYST REMOVAL      back    ESOPHAGOGASTRODUODENOSCOPY  07/2016    duodenal angioectasia    ESOPHAGOGASTRODUODENOSCOPY (EGD) N/A 7/27/2016    Performed by Malik Sanchez MD at [facility] ENDO (4TH FLR)    FOOT FRACTURE SURGERY  10/2012    right foot, with R hallux valgus repair    FRACTURE SURGERY Right     broken toe repiar    HEMORRHOID SURGERY      LIVER BIOPSY  04/2015    essentially normal, no fibrosis    TUBAL LIGATION      UPPER GASTROINTESTINAL ENDOSCOPY         Family History:   Family History   Problem Relation Age of Onset    No Known Problems Sister     Cancer Sister 63        Lung Cancer    No Known Problems Mother     Cancer Father     No Known Problems Brother     Hypertension Daughter     Fibroids Daughter         uterine    Hypertension Son     Breast cancer Sister 62    No Known Problems Brother     No Known Problems Maternal Aunt     No Known Problems Maternal Uncle     No Known Problems Paternal Aunt     No Known Problems Paternal Uncle     Hypertension Maternal Grandmother     No Known Problems Maternal Grandfather     No Known Problems Paternal Grandmother     No Known Problems Paternal Grandfather     Ovarian cancer Neg Hx     Colon cancer Neg Hx     Tremor Neg Hx     Amblyopia Neg Hx     Blindness Neg Hx     Cataracts Neg Hx     Diabetes Neg Hx     Glaucoma Neg Hx     Macular degeneration Neg Hx     Retinal detachment Neg Hx     Strabismus Neg Hx     Stroke Neg Hx     Thyroid disease Neg Hx     Esophageal cancer Neg Hx     Rectal cancer Neg Hx     Stomach cancer Neg Hx     Ulcerative colitis Neg Hx     Crohn's disease Neg Hx     Irritable bowel syndrome Neg Hx     Celiac disease Neg Hx         Social History:  reports that she quit smoking about 8 years ago. Her smoking use included cigarettes. She has a 10.00 pack-year smoking history. She quit smokeless tobacco use about 8 years ago. She reports that she drinks alcohol. She reports that she does not use drugs.    Allergies:  Review of patient's allergies indicates:   Allergen Reactions    Lipitor [atorvastatin]      Itching, elevated cpk, muscle aches, statin induced myositis       Medications:  Current Outpatient Medications   Medication Sig Dispense Refill    acetaminophen (TYLENOL) 650 MG TbSR Take 1,300 mg by mouth daily as needed (pain).      alirocumab (PRALUENT PEN) 75 mg/mL PnIj Inject 1 mL (75 mg total) into the skin every 14 (fourteen) days. Ask your PCP/ Oncologist 2 mL 11    allopurinol (ZYLOPRIM) 300 MG tablet Take 1 tablet (300 mg total) by mouth once daily. 30 tablet 1    amLODIPine (NORVASC) 5 MG tablet TAKE 1 TABLET BY MOUTH EVERY DAY 90 tablet 3    aspirin 81 mg Tab Take 1 tablet by mouth Daily.      bisacodyl (DULCOLAX) 5 mg EC tablet Take 5 mg by mouth once daily.      dexamethasone (DECADRON) 4 MG Tab Take 10 tablets (40 mg total) by mouth once a week. Please take on day of Velcade administration. 80 tablet 0    ferrous gluconate 324 mg (37.5 mg iron) Tab TAKE 1 TABLET BY MOUTH 2 (TWO) TIMES DAILY WITH MEALS. 180 tablet 0    gabapentin (NEURONTIN) 300 MG capsule TAKE 1 CAPSULE (300 MG TOTAL) BY MOUTH 2 (TWO) TIMES DAILY. 180 capsule 3    lenalidomide (REVLIMID) 10 mg Cap Take 10 mg by mouth once daily Take days 1-21 and then off 7 days.  auth # [id number] on 6/5/19. 21 each 0    lidocaine (LIDODERM) 5 % Place 1 patch onto the skin once daily. Remove & Discard patch within 12 hours or as directed by MD. 30 patch 0    omeprazole (PRILOSEC) 40 MG capsule TAKE 1 CAPSULE (40 MG TOTAL) BY MOUTH ONCE DAILY. 90 capsule 3    ondansetron (ZOFRAN) 4 MG tablet Take 1 tablet (4 mg total) by mouth every 6 (six) hours as needed  for Nausea. 30 tablet 1    ondansetron (ZOFRAN-ODT) 8 MG TbDL Take 1 tablet (8 mg total) by mouth every 8 (eight) hours as needed (nausea/vomiting). 21 tablet 0    sertraline (ZOLOFT) 50 MG tablet TAKE 1 TABLET (50 MG TOTAL) BY MOUTH ONCE DAILY. 90 tablet 3    traMADol (ULTRAM) 50 mg tablet Take 1 tablet (50 mg total) by mouth 2 (two) times daily as needed for Pain. 60 tablet 3    vitamin D 1000 units Tab Take 185 mg by mouth once daily.      acyclovir (ZOVIRAX) 400 MG tablet Take 1 tablet (400 mg total) by mouth 2 (two) times daily. 120 tablet 2     Current Facility-Administered Medications   Medication Dose Route Frequency Provider Last Rate Last Dose    zoledronic acid (ZOMETA) 4 mg in sodium chloride 0.9% 100 mL IVPB  4 mg Intravenous 1 time in Clinic/HOD Genny Napoles MD           Review of Systems   Constitutional: Positive for chills and fatigue.   Respiratory: Positive for shortness of breath. Negative for cough.    Cardiovascular: Negative for chest pain and palpitations.   Gastrointestinal: Positive for nausea. Negative for abdominal pain and diarrhea.   Genitourinary: Negative for difficulty urinating and dysuria.   Musculoskeletal: Positive for back pain and gait problem.   Skin: Negative for rash.   Neurological: Positive for tremors, weakness and numbness. Negative for headaches.   Hematological: Negative for adenopathy.       ECOG Performance Status: 0   Objective:      Vitals:   Vitals:    06/20/19 1054   BP: 127/77   BP Location: Left arm   Patient Position: Sitting   BP Method: Medium (Automatic)   Pulse: 66   Resp: 18   Temp: 98.2 °F (36.8 °C)   TempSrc: Oral   SpO2: 96%   Weight: 75.8 kg (167 lb 1.7 oz)     BMI: Body mass index is 29.6 kg/m².    Physical Exam   Constitutional: She is oriented to person, place, and time. She appears well-developed and well-nourished. No distress.   HENT:   Head: Normocephalic and atraumatic.   Mouth/Throat: Oropharynx is clear and moist.   Eyes: Pupils  are equal, round, and reactive to light. Conjunctivae and EOM are normal.   Neck: Normal range of motion. Neck supple.   Cardiovascular: Normal rate, regular rhythm and normal heart sounds. Exam reveals no gallop and no friction rub.   No murmur heard.  Pulmonary/Chest: Effort normal and breath sounds normal. No respiratory distress. She has no wheezes. She has no rales.   Abdominal: Soft. Bowel sounds are normal. She exhibits no distension. There is no tenderness. There is no guarding.   Musculoskeletal: Normal range of motion. She exhibits no edema.   Lymphadenopathy:     She has no cervical adenopathy.   Neurological: She is alert and oriented to person, place, and time. No cranial nerve deficit.   Intentional R hand tremor; RUE - 4/5 strength and RLE extremity also 4/5; full strengths on LUE and LLE   Skin: Skin is warm and dry. No rash noted.       Laboratory Data:  Lab Visit on 06/20/2019   Component Date Value Ref Range Status    Protein, Serum 06/20/2019 7.5  6.0 - 8.4 g/dL Final    Comment: Serum protein electrophoresis and immunofixation results should be   interpreted in clinical context in that some therapeutic agents can   result   in false positive results (example, daratumumab). Correlation with   the   patient s therapeutic regimen is required.      IgG - Serum 06/20/2019 2614* 650 - 1600 mg/dL Final    IgG Cord Blood Reference Range: 650-1600 mg/dL.    IgA 06/20/2019 33* 40 - 350 mg/dL Final    IgA Cord Blood Reference Range: <5 mg/dL.    IgM 06/20/2019 18* 50 - 300 mg/dL Final    IgM Cord Blood Reference Range: <25 mg/dL.    WBC 06/20/2019 4.11  3.90 - 12.70 K/uL Final    RBC 06/20/2019 4.08  4.00 - 5.40 M/uL Final    Hemoglobin 06/20/2019 9.6* 12.0 - 16.0 g/dL Final    Hematocrit 06/20/2019 32.5* 37.0 - 48.5 % Final    Mean Corpuscular Volume 06/20/2019 80* 82 - 98 fL Final    Mean Corpuscular Hemoglobin 06/20/2019 23.5* 27.0 - 31.0 pg Final    Mean Corpuscular Hemoglobin Conc  06/20/2019 29.5* 32.0 - 36.0 g/dL Final    RDW 06/20/2019 20.5* 11.5 - 14.5 % Final    Platelets 06/20/2019 245  150 - 350 K/uL Final    MPV 06/20/2019 11.9  9.2 - 12.9 fL Final    Immature Granulocytes 06/20/2019 0.2  0.0 - 0.5 % Final    Gran # (ANC) 06/20/2019 2.7  1.8 - 7.7 K/uL Final    Immature Grans (Abs) 06/20/2019 0.01  0.00 - 0.04 K/uL Final    Comment: Mild elevation in immature granulocytes is non specific and   can be seen in a variety of conditions including stress response,   acute inflammation, trauma and pregnancy. Correlation with other   laboratory and clinical findings is essential.      Lymph # 06/20/2019 0.8* 1.0 - 4.8 K/uL Final    Mono # 06/20/2019 0.5  0.3 - 1.0 K/uL Final    Eos # 06/20/2019 0.2  0.0 - 0.5 K/uL Final    Baso # 06/20/2019 0.03  0.00 - 0.20 K/uL Final    nRBC 06/20/2019 0  0 /100 WBC Final    Gran% 06/20/2019 64.5  38.0 - 73.0 % Final    Lymph% 06/20/2019 19.0  18.0 - 48.0 % Final    Mono% 06/20/2019 11.7  4.0 - 15.0 % Final    Eosinophil% 06/20/2019 3.9  0.0 - 8.0 % Final    Basophil% 06/20/2019 0.7  0.0 - 1.9 % Final    Differential Method 06/20/2019 Automated   Final    Sodium 06/20/2019 141  136 - 145 mmol/L Final    Potassium 06/20/2019 3.7  3.5 - 5.1 mmol/L Final    Chloride 06/20/2019 106  95 - 110 mmol/L Final    CO2 06/20/2019 28  23 - 29 mmol/L Final    Glucose 06/20/2019 95  70 - 110 mg/dL Final    BUN, Bld 06/20/2019 11  8 - 23 mg/dL Final    Creatinine 06/20/2019 0.9  0.5 - 1.4 mg/dL Final    Calcium 06/20/2019 8.5* 8.7 - 10.5 mg/dL Final    Total Protein 06/20/2019 7.8  6.0 - 8.4 g/dL Final    Albumin 06/20/2019 3.2* 3.5 - 5.2 g/dL Final    Total Bilirubin 06/20/2019 0.4  0.1 - 1.0 mg/dL Final    Comment: For infants and newborns, interpretation of results should be based  on gestational age, weight and in agreement with clinical  observations.  Premature Infant recommended reference ranges:  Up to 24 hours.............<8.0 mg/dL  Up  to 48 hours............<12.0 mg/dL  3-5 days..................<15.0 mg/dL  6-29 days.................<15.0 mg/dL      Alkaline Phosphatase 06/20/2019 124  55 - 135 U/L Final    AST 06/20/2019 14  10 - 40 U/L Final    ALT 06/20/2019 18  10 - 44 U/L Final    Anion Gap 06/20/2019 7* 8 - 16 mmol/L Final    eGFR if African American 06/20/2019 >60.0  >60 mL/min/1.73 m^2 Final    eGFR if non African American 06/20/2019 >60.0  >60 mL/min/1.73 m^2 Final    Comment: Calculation used to obtain the estimated glomerular filtration  rate (eGFR) is the CKD-EPI equation.       Magnesium 06/20/2019 2.1  1.6 - 2.6 mg/dL Final    Phosphorus 06/20/2019 2.2* 2.7 - 4.5 mg/dL Final    Uric Acid 06/20/2019 3.3  2.4 - 5.7 mg/dL Final         Imaging:  Assessment:       1. Multiple myeloma not having achieved remission           Plan:     Multiple Myeloma  - ECOG PS 1  - pt met most of Crab criteria in hospital - had anemia (hgb 7.1) corrected calcium of 10.6, renal function with Cr of 2.7 and T7 verterbral fracture  - Kappa was noted to be 548, with a k/l ratio of 693.7.  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  At  diagnosis, this translocation has an unfavorable prognosis in multiple myeloma (Correa et al., Blood [id number], 2003).  - 14:20 translocation will place this as R-ISS Stage III, high risk disease  - Bone marrow biopsy completed on 5/3 revealed a plasma cell population of 70-80% for this patient  -  PET-CT on 5/15 results as above with left scapula lesion SUV 3, L5 vertebral body lesion SUV 4.7, stable T7 compression fracture and minimally displaced left lateral 4th rib fracture; no activity in previously noted in splenic or hepatic regions   -SPEP reveals M-protein was noted to be 6.75 g/dL, NATALY reveals IgG kappa monoclonal protein    - Urine protein 24h  quantification, 663 mg   - Cr of 0.9 today, s/p four doses of dexamethasone 40 mg, w last dose on 5/9  - CrCl of 57 - 75 currently, depending on equation used; discussed about possibility of going to revilimid 25 mg in near future, with re-prescription for revilimid  - Plan for velcade 1.3 mg/m2 weekly on days 1,8.15.22 for 28 day cycles, also will plan to dose dexamethasone 40 mg weekly as well on days of velcade shots; can likely increase revilimid to 25 mg prior to next cycle  - went over risks of neuropathy, blood clots, VZV reactivation, and GI symptoms such as nausea, diarrhea and pt also asked to call us and/or come to ER for new fevers greater than 100.4F or new onset brusing bleeding. Pt agrees to proceed with therapy and has had  sign consent form due to limited dexterity with R hand  -  continue aspirin 81 mg daily; start acyclovir 400 BID, discussed risks of shingles if this medication is not included in regimen  - Hgb of 9.6 today  - plan for zometa monthly, no need for dental eval as she has no current known jaw breakdown and is edentulous; can do 4 mg during each clinic visit  - C1 of VRD 5/23 - 6/19  - C2 of VRD - starting now on 6/20     Nausea/ vomiting  - controlled on zofran at first sign of nausea  - will add secondary medications for additional control too if this becomes worse or uncontrolled      Malnutrition/loss of appetite  - discussed with SW about arranging pt with boost/ensure      Anemia  - low ferritin in years past 4-8, currently is 54 on 4/30/19 while on iron supplementation  - prior GI eval in 2015 and 16  - will continue to monitor for worsening anemia and signs sx of bleeding     FUENTES  - 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 Tineo    F/u:   Follow-up SPEP/NATALY, light chains from today's labs  Follow-up patient back in a month with CBC, CMP, Mg, Phos,  Uric acid, free light chains, SPEP, NATALY, quant immunoglobulins checked prior to visit. She will continue to require chair for weekly Velcade, starting on the day of clinic visit. Patient will also need zometa on day of next clinic visit, gets it monthly.         Genny Napoles MD   Hematology and Oncology Fellow, PGY IV  [facility]    Distress Screening Results: Psychosocial Distress screening score of Distress Score: 3 noted and reviewed. No intervention indicated.

## 2019-06-20 ENCOUNTER — OFFICE VISIT (OUTPATIENT)
Dept: HEMATOLOGY/ONCOLOGY | Facility: CLINIC | Age: 69
End: 2019-06-20
Payer: MEDICARE

## 2019-06-20 ENCOUNTER — INFUSION (OUTPATIENT)
Dept: INFUSION THERAPY | Facility: HOSPITAL | Age: 69
End: 2019-06-20
Attending: INTERNAL MEDICINE
Payer: MEDICARE

## 2019-06-20 VITALS
BODY MASS INDEX: 29.6 KG/M2 | OXYGEN SATURATION: 96 % | SYSTOLIC BLOOD PRESSURE: 127 MMHG | WEIGHT: 167.13 LBS | DIASTOLIC BLOOD PRESSURE: 77 MMHG | HEART RATE: 66 BPM | TEMPERATURE: 98 F | RESPIRATION RATE: 18 BRPM

## 2019-06-20 VITALS
RESPIRATION RATE: 18 BRPM | HEIGHT: 63 IN | BODY MASS INDEX: 29.61 KG/M2 | WEIGHT: 167.13 LBS | SYSTOLIC BLOOD PRESSURE: 119 MMHG | DIASTOLIC BLOOD PRESSURE: 68 MMHG | HEART RATE: 66 BPM

## 2019-06-20 DIAGNOSIS — C90.00 MULTIPLE MYELOMA NOT HAVING ACHIEVED REMISSION: Primary | ICD-10-CM

## 2019-06-20 DIAGNOSIS — C90.00 MULTIPLE MYELOMA NOT HAVING ACHIEVED REMISSION: ICD-10-CM

## 2019-06-20 PROCEDURE — 99999 PR PBB SHADOW E&M-EST. PATIENT-LVL IV: ICD-10-PCS | Mod: PBBFAC,HCNC,GC, | Performed by: STUDENT IN AN ORGANIZED HEALTH CARE EDUCATION/TRAINING PROGRAM

## 2019-06-20 PROCEDURE — 25000003 PHARM REV CODE 250: Mod: HCNC | Performed by: STUDENT IN AN ORGANIZED HEALTH CARE EDUCATION/TRAINING PROGRAM

## 2019-06-20 PROCEDURE — 96367 TX/PROPH/DG ADDL SEQ IV INF: CPT | Mod: HCNC

## 2019-06-20 PROCEDURE — 63600175 PHARM REV CODE 636 W HCPCS: Mod: HCNC | Performed by: STUDENT IN AN ORGANIZED HEALTH CARE EDUCATION/TRAINING PROGRAM

## 2019-06-20 PROCEDURE — 1101F PT FALLS ASSESS-DOCD LE1/YR: CPT | Mod: HCNC,CPTII,GC,S$GLB | Performed by: STUDENT IN AN ORGANIZED HEALTH CARE EDUCATION/TRAINING PROGRAM

## 2019-06-20 PROCEDURE — 63600175 PHARM REV CODE 636 W HCPCS: Mod: HCNC,JG | Performed by: INTERNAL MEDICINE

## 2019-06-20 PROCEDURE — 3078F PR MOST RECENT DIASTOLIC BLOOD PRESSURE < 80 MM HG: ICD-10-PCS | Mod: HCNC,CPTII,GC,S$GLB | Performed by: STUDENT IN AN ORGANIZED HEALTH CARE EDUCATION/TRAINING PROGRAM

## 2019-06-20 PROCEDURE — 1101F PR PT FALLS ASSESS DOC 0-1 FALLS W/OUT INJ PAST YR: ICD-10-PCS | Mod: HCNC,CPTII,GC,S$GLB | Performed by: STUDENT IN AN ORGANIZED HEALTH CARE EDUCATION/TRAINING PROGRAM

## 2019-06-20 PROCEDURE — 96401 CHEMO ANTI-NEOPL SQ/IM: CPT | Mod: HCNC

## 2019-06-20 PROCEDURE — 99999 PR PBB SHADOW E&M-EST. PATIENT-LVL IV: CPT | Mod: PBBFAC,HCNC,GC, | Performed by: STUDENT IN AN ORGANIZED HEALTH CARE EDUCATION/TRAINING PROGRAM

## 2019-06-20 PROCEDURE — 3074F SYST BP LT 130 MM HG: CPT | Mod: HCNC,CPTII,GC,S$GLB | Performed by: STUDENT IN AN ORGANIZED HEALTH CARE EDUCATION/TRAINING PROGRAM

## 2019-06-20 PROCEDURE — 99214 PR OFFICE/OUTPT VISIT, EST, LEVL IV, 30-39 MIN: ICD-10-PCS | Mod: HCNC,GC,S$GLB, | Performed by: STUDENT IN AN ORGANIZED HEALTH CARE EDUCATION/TRAINING PROGRAM

## 2019-06-20 PROCEDURE — 3074F PR MOST RECENT SYSTOLIC BLOOD PRESSURE < 130 MM HG: ICD-10-PCS | Mod: HCNC,CPTII,GC,S$GLB | Performed by: STUDENT IN AN ORGANIZED HEALTH CARE EDUCATION/TRAINING PROGRAM

## 2019-06-20 PROCEDURE — 99214 OFFICE O/P EST MOD 30 MIN: CPT | Mod: HCNC,GC,S$GLB, | Performed by: STUDENT IN AN ORGANIZED HEALTH CARE EDUCATION/TRAINING PROGRAM

## 2019-06-20 PROCEDURE — 3078F DIAST BP <80 MM HG: CPT | Mod: HCNC,CPTII,GC,S$GLB | Performed by: STUDENT IN AN ORGANIZED HEALTH CARE EDUCATION/TRAINING PROGRAM

## 2019-06-20 RX ORDER — HEPARIN 100 UNIT/ML
500 SYRINGE INTRAVENOUS
Status: CANCELLED | OUTPATIENT
Start: 2019-06-27

## 2019-06-20 RX ORDER — SODIUM CHLORIDE 0.9 % (FLUSH) 0.9 %
10 SYRINGE (ML) INJECTION
Status: DISCONTINUED | OUTPATIENT
Start: 2019-06-20 | End: 2019-06-20 | Stop reason: HOSPADM

## 2019-06-20 RX ORDER — HEPARIN 100 UNIT/ML
500 SYRINGE INTRAVENOUS
Status: CANCELLED | OUTPATIENT
Start: 2019-06-21

## 2019-06-20 RX ORDER — SODIUM CHLORIDE 0.9 % (FLUSH) 0.9 %
10 SYRINGE (ML) INJECTION
Status: CANCELLED | OUTPATIENT
Start: 2019-06-27

## 2019-06-20 RX ORDER — BORTEZOMIB 3.5 MG/1
1.3 INJECTION, POWDER, LYOPHILIZED, FOR SOLUTION INTRAVENOUS; SUBCUTANEOUS
Status: CANCELLED | OUTPATIENT
Start: 2019-06-27

## 2019-06-20 RX ORDER — BORTEZOMIB 3.5 MG/1
1.3 INJECTION, POWDER, LYOPHILIZED, FOR SOLUTION INTRAVENOUS; SUBCUTANEOUS
Status: COMPLETED | OUTPATIENT
Start: 2019-06-20 | End: 2019-06-20

## 2019-06-20 RX ORDER — ACYCLOVIR 400 MG/1
400 TABLET ORAL 2 TIMES DAILY
Qty: 120 TABLET | Refills: 2 | Status: SHIPPED | OUTPATIENT
Start: 2019-06-20 | End: 2019-12-13 | Stop reason: SDUPTHER

## 2019-06-20 RX ORDER — SODIUM CHLORIDE 0.9 % (FLUSH) 0.9 %
10 SYRINGE (ML) INJECTION
Status: CANCELLED | OUTPATIENT
Start: 2019-06-21

## 2019-06-20 RX ORDER — HEPARIN 100 UNIT/ML
500 SYRINGE INTRAVENOUS
Status: DISCONTINUED | OUTPATIENT
Start: 2019-06-20 | End: 2019-06-20 | Stop reason: HOSPADM

## 2019-06-20 RX ORDER — BORTEZOMIB 3.5 MG/1
1.3 INJECTION, POWDER, LYOPHILIZED, FOR SOLUTION INTRAVENOUS; SUBCUTANEOUS
Status: CANCELLED | OUTPATIENT
Start: 2019-06-21

## 2019-06-20 RX ADMIN — SODIUM CHLORIDE 4 MG: 9 INJECTION, SOLUTION INTRAVENOUS at 12:06

## 2019-06-20 RX ADMIN — BORTEZOMIB 2.4 MG: 3.5 INJECTION, POWDER, LYOPHILIZED, FOR SOLUTION INTRAVENOUS; SUBCUTANEOUS at 12:06

## 2019-06-20 NOTE — PLAN OF CARE
Problem: Adult Inpatient Plan of Care  Goal: Plan of Care Review  Outcome: Ongoing (interventions implemented as appropriate)  Pt tolerated zometa and velcade injection without complications. VSS. No s/s of reaction. Instructed to contact MD with any questions. PIV removed and AVS given to patient.

## 2019-06-20 NOTE — Clinical Note
Check CBC, CMP, Mg, Phos SPEP, NATALY, quant immunoglobulins, free light chains in a month with follow-up on same day and chair for velcade.ALso, we need to schedule her weekly appointments for Velcade for Thursdays shots until the date of appointment next month, thanks.

## 2019-06-20 NOTE — Clinical Note
Patient will also need zometa on day of next clinic visit in a month, will be getting it monthly, thanks.

## 2019-06-20 NOTE — Clinical Note
KESHAWN Arevalo, this is Cole Napoles, one of the Heme/Onc fellows. I saw Ms. Thompson today in follow-up; her and  were asking if your office had any follow-up info on transport chair/wheelchair that was been ordered for them. I let them know that I would discuss with you, see if we can assist in any way too...

## 2019-06-20 NOTE — Clinical Note
Scooter Lopez,This is Cole Napoles, had some questions about Ms. Thompson. Her creatinine clearance is 70 per CG equation versus 57 if we adjust for her being overweight.Since, she is close to 60 even with adjustment, can we just go back up to 25 mg Revilimid?Also, can you help in fixing the plan for her? It is labelled as day 1,8,15,22 for Velcade but the cycles seem to move on after day 15 dose

## 2019-06-27 ENCOUNTER — INFUSION (OUTPATIENT)
Dept: INFUSION THERAPY | Facility: HOSPITAL | Age: 69
End: 2019-06-27
Attending: INTERNAL MEDICINE
Payer: MEDICARE

## 2019-06-27 VITALS — WEIGHT: 167.13 LBS | HEIGHT: 63 IN | BODY MASS INDEX: 29.61 KG/M2

## 2019-06-27 DIAGNOSIS — C90.00 MULTIPLE MYELOMA NOT HAVING ACHIEVED REMISSION: Primary | ICD-10-CM

## 2019-06-27 DIAGNOSIS — C90.00 MULTIPLE MYELOMA NOT HAVING ACHIEVED REMISSION: ICD-10-CM

## 2019-06-27 PROCEDURE — 96401 CHEMO ANTI-NEOPL SQ/IM: CPT | Mod: HCNC

## 2019-06-27 PROCEDURE — 63600175 PHARM REV CODE 636 W HCPCS: Mod: HCNC,JG | Performed by: INTERNAL MEDICINE

## 2019-06-27 RX ORDER — HEPARIN 100 UNIT/ML
500 SYRINGE INTRAVENOUS
Status: CANCELLED | OUTPATIENT
Start: 2019-07-11

## 2019-06-27 RX ORDER — HEPARIN 100 UNIT/ML
500 SYRINGE INTRAVENOUS
Status: CANCELLED | OUTPATIENT
Start: 2019-07-18

## 2019-06-27 RX ORDER — BORTEZOMIB 3.5 MG/1
1.3 INJECTION, POWDER, LYOPHILIZED, FOR SOLUTION INTRAVENOUS; SUBCUTANEOUS
Status: COMPLETED | OUTPATIENT
Start: 2019-06-27 | End: 2019-06-27

## 2019-06-27 RX ORDER — BORTEZOMIB 3.5 MG/1
1.3 INJECTION, POWDER, LYOPHILIZED, FOR SOLUTION INTRAVENOUS; SUBCUTANEOUS
Status: CANCELLED | OUTPATIENT
Start: 2019-07-25

## 2019-06-27 RX ORDER — SODIUM CHLORIDE 0.9 % (FLUSH) 0.9 %
10 SYRINGE (ML) INJECTION
Status: CANCELLED | OUTPATIENT
Start: 2019-07-18

## 2019-06-27 RX ORDER — BORTEZOMIB 3.5 MG/1
1.3 INJECTION, POWDER, LYOPHILIZED, FOR SOLUTION INTRAVENOUS; SUBCUTANEOUS
Status: CANCELLED | OUTPATIENT
Start: 2019-07-11

## 2019-06-27 RX ORDER — SODIUM CHLORIDE 0.9 % (FLUSH) 0.9 %
10 SYRINGE (ML) INJECTION
Status: CANCELLED | OUTPATIENT
Start: 2019-07-25

## 2019-06-27 RX ORDER — BORTEZOMIB 3.5 MG/1
1.3 INJECTION, POWDER, LYOPHILIZED, FOR SOLUTION INTRAVENOUS; SUBCUTANEOUS
Status: CANCELLED | OUTPATIENT
Start: 2019-07-18

## 2019-06-27 RX ORDER — LENALIDOMIDE 10 MG/1
CAPSULE ORAL
Refills: 0 | OUTPATIENT
Start: 2019-06-27

## 2019-06-27 RX ORDER — HEPARIN 100 UNIT/ML
500 SYRINGE INTRAVENOUS
Status: CANCELLED | OUTPATIENT
Start: 2019-07-25

## 2019-06-27 RX ORDER — SODIUM CHLORIDE 0.9 % (FLUSH) 0.9 %
10 SYRINGE (ML) INJECTION
Status: CANCELLED | OUTPATIENT
Start: 2019-07-11

## 2019-06-27 RX ADMIN — BORTEZOMIB 2.4 MG: 3.5 INJECTION, POWDER, LYOPHILIZED, FOR SOLUTION INTRAVENOUS; SUBCUTANEOUS at 11:06

## 2019-06-28 RX ORDER — SODIUM CHLORIDE 0.9 % (FLUSH) 0.9 %
10 SYRINGE (ML) INJECTION
Status: CANCELLED | OUTPATIENT
Start: 2019-06-28

## 2019-06-28 RX ORDER — HEPARIN 100 UNIT/ML
500 SYRINGE INTRAVENOUS
Status: CANCELLED | OUTPATIENT
Start: 2019-06-28

## 2019-07-03 DIAGNOSIS — C90.00 MULTIPLE MYELOMA NOT HAVING ACHIEVED REMISSION: ICD-10-CM

## 2019-07-03 RX ORDER — LENALIDOMIDE 10 MG/1
10 CAPSULE ORAL DAILY
Qty: 21 EACH | Refills: 0 | Status: SHIPPED | OUTPATIENT
Start: 2019-07-03 | End: 2019-07-31 | Stop reason: SDUPTHER

## 2019-07-05 ENCOUNTER — TELEPHONE (OUTPATIENT)
Dept: INTERNAL MEDICINE | Facility: CLINIC | Age: 69
End: 2019-07-05

## 2019-07-05 ENCOUNTER — PATIENT MESSAGE (OUTPATIENT)
Dept: HEMATOLOGY/ONCOLOGY | Facility: CLINIC | Age: 69
End: 2019-07-05

## 2019-07-05 ENCOUNTER — INFUSION (OUTPATIENT)
Dept: INFUSION THERAPY | Facility: HOSPITAL | Age: 69
End: 2019-07-05
Attending: STUDENT IN AN ORGANIZED HEALTH CARE EDUCATION/TRAINING PROGRAM
Payer: MEDICARE

## 2019-07-05 VITALS
DIASTOLIC BLOOD PRESSURE: 74 MMHG | SYSTOLIC BLOOD PRESSURE: 124 MMHG | RESPIRATION RATE: 18 BRPM | TEMPERATURE: 99 F | HEART RATE: 75 BPM

## 2019-07-05 DIAGNOSIS — C90.00 MULTIPLE MYELOMA NOT HAVING ACHIEVED REMISSION: Primary | ICD-10-CM

## 2019-07-05 PROCEDURE — 63600175 PHARM REV CODE 636 W HCPCS: Mod: HCNC,JG | Performed by: INTERNAL MEDICINE

## 2019-07-05 PROCEDURE — 96401 CHEMO ANTI-NEOPL SQ/IM: CPT | Mod: HCNC

## 2019-07-05 RX ORDER — SODIUM CHLORIDE 0.9 % (FLUSH) 0.9 %
10 SYRINGE (ML) INJECTION
Status: DISCONTINUED | OUTPATIENT
Start: 2019-07-05 | End: 2019-07-05 | Stop reason: HOSPADM

## 2019-07-05 RX ORDER — BORTEZOMIB 3.5 MG/1
1.3 INJECTION, POWDER, LYOPHILIZED, FOR SOLUTION INTRAVENOUS; SUBCUTANEOUS
Status: COMPLETED | OUTPATIENT
Start: 2019-07-05 | End: 2019-07-05

## 2019-07-05 RX ORDER — HEPARIN 100 UNIT/ML
500 SYRINGE INTRAVENOUS
Status: DISCONTINUED | OUTPATIENT
Start: 2019-07-05 | End: 2019-07-05 | Stop reason: HOSPADM

## 2019-07-05 RX ADMIN — BORTEZOMIB 2.4 MG: 3.5 INJECTION, POWDER, LYOPHILIZED, FOR SOLUTION INTRAVENOUS; SUBCUTANEOUS at 10:07

## 2019-07-05 NOTE — TELEPHONE ENCOUNTER
----- Message from Genny Napoles MD sent at 6/20/2019 10:40 PM CDT -----  HI Dr. Arevalo, this is Cole Napoles, one of the Heme/Onc fellows. I saw Ms. Thompson today in follow-up; her and  were asking if your office had any follow-up info on transport chair/wheelchair that was been ordered for them. I let them know that I would discuss with you, see if we can assist in any way too...

## 2019-07-05 NOTE — TELEPHONE ENCOUNTER
I cant find any note and papers stating it was done, can you please order it again and I will call her to see where it need to go.

## 2019-07-05 NOTE — NURSING
1008  Pt here for Velcade injection, accompanied by friend, reports left lower leg swelling x 2 weeks, no redness, tenderness or temp change noted, pt states MD Velascoe aware, pt instructed to monitor for changes or worsening condition and contact MD as needed, also to limit dietary sodium and elevate leg whenever possible; SQ injection to RUAQ, tolerated well; pt instructed to contact MD for any needs or concerns; printed AVS given to and reviewed with pt and friend, both verbalized understanding of all discussed and when to report next

## 2019-07-05 NOTE — TELEPHONE ENCOUNTER
I put order in for rohan moore in May - Can you see what the hold up is or If I have to do something different.

## 2019-07-10 ENCOUNTER — TELEPHONE (OUTPATIENT)
Dept: INTERNAL MEDICINE | Facility: CLINIC | Age: 69
End: 2019-07-10

## 2019-07-10 DIAGNOSIS — Z12.31 ENCOUNTER FOR SCREENING MAMMOGRAM FOR BREAST CANCER: Primary | ICD-10-CM

## 2019-07-10 NOTE — TELEPHONE ENCOUNTER
----- Message from Digna Li sent at 7/10/2019  2:34 PM CDT -----  Contact: [employer] request  Message     Appointment Request From: Shelby Thompson    With Provider: Emanuel Arevalo MD [[facility]]    Preferred Date Range: Any date 9/11/2019 or later    Preferred Times: Wednesday Morning    Reason for visit: Mamorgram    Comments:  I'm due for a mamorgram.

## 2019-07-11 ENCOUNTER — INFUSION (OUTPATIENT)
Dept: INFUSION THERAPY | Facility: HOSPITAL | Age: 69
End: 2019-07-11
Attending: INTERNAL MEDICINE
Payer: MEDICARE

## 2019-07-11 VITALS — HEART RATE: 72 BPM | SYSTOLIC BLOOD PRESSURE: 129 MMHG | RESPIRATION RATE: 18 BRPM | DIASTOLIC BLOOD PRESSURE: 81 MMHG

## 2019-07-11 DIAGNOSIS — C90.00 MULTIPLE MYELOMA NOT HAVING ACHIEVED REMISSION: Primary | ICD-10-CM

## 2019-07-11 DIAGNOSIS — N17.9 AKI (ACUTE KIDNEY INJURY): ICD-10-CM

## 2019-07-11 PROCEDURE — 63600175 PHARM REV CODE 636 W HCPCS: Mod: HCNC,JG | Performed by: INTERNAL MEDICINE

## 2019-07-11 PROCEDURE — 96401 CHEMO ANTI-NEOPL SQ/IM: CPT | Mod: HCNC

## 2019-07-11 RX ORDER — BORTEZOMIB 3.5 MG/1
1.3 INJECTION, POWDER, LYOPHILIZED, FOR SOLUTION INTRAVENOUS; SUBCUTANEOUS
Status: COMPLETED | OUTPATIENT
Start: 2019-07-11 | End: 2019-07-11

## 2019-07-11 RX ORDER — ALLOPURINOL 300 MG/1
300 TABLET ORAL DAILY
Qty: 30 TABLET | Refills: 1 | Status: CANCELLED | OUTPATIENT
Start: 2019-07-11

## 2019-07-11 RX ADMIN — BORTEZOMIB 2.4 MG: 3.5 INJECTION, POWDER, LYOPHILIZED, FOR SOLUTION INTRAVENOUS; SUBCUTANEOUS at 11:07

## 2019-07-12 DIAGNOSIS — N17.9 AKI (ACUTE KIDNEY INJURY): ICD-10-CM

## 2019-07-15 RX ORDER — ALLOPURINOL 300 MG/1
300 TABLET ORAL DAILY
Qty: 30 TABLET | Refills: 1 | OUTPATIENT
Start: 2019-07-15

## 2019-07-16 ENCOUNTER — TELEPHONE (OUTPATIENT)
Dept: HEMATOLOGY/ONCOLOGY | Facility: CLINIC | Age: 69
End: 2019-07-16

## 2019-07-16 DIAGNOSIS — C90.00 MULTIPLE MYELOMA NOT HAVING ACHIEVED REMISSION: Primary | ICD-10-CM

## 2019-07-16 RX ORDER — DEXAMETHASONE 4 MG/1
40 TABLET ORAL
Qty: 40 TABLET | Refills: 0 | Status: SHIPPED | OUTPATIENT
Start: 2019-07-16 | End: 2019-08-13

## 2019-07-18 ENCOUNTER — INFUSION (OUTPATIENT)
Dept: INFUSION THERAPY | Facility: HOSPITAL | Age: 69
End: 2019-07-18
Attending: INTERNAL MEDICINE
Payer: MEDICARE

## 2019-07-18 VITALS
TEMPERATURE: 98 F | RESPIRATION RATE: 18 BRPM | HEART RATE: 76 BPM | BODY MASS INDEX: 29.57 KG/M2 | SYSTOLIC BLOOD PRESSURE: 120 MMHG | HEIGHT: 63 IN | WEIGHT: 166.88 LBS | DIASTOLIC BLOOD PRESSURE: 85 MMHG

## 2019-07-18 DIAGNOSIS — C90.00 MULTIPLE MYELOMA NOT HAVING ACHIEVED REMISSION: Primary | ICD-10-CM

## 2019-07-18 DIAGNOSIS — N17.9 AKI (ACUTE KIDNEY INJURY): ICD-10-CM

## 2019-07-18 PROCEDURE — 96401 CHEMO ANTI-NEOPL SQ/IM: CPT | Mod: HCNC

## 2019-07-18 PROCEDURE — 63600175 PHARM REV CODE 636 W HCPCS: Mod: HCNC,JG | Performed by: INTERNAL MEDICINE

## 2019-07-18 RX ORDER — BORTEZOMIB 3.5 MG/1
1.3 INJECTION, POWDER, LYOPHILIZED, FOR SOLUTION INTRAVENOUS; SUBCUTANEOUS
Status: COMPLETED | OUTPATIENT
Start: 2019-07-18 | End: 2019-07-18

## 2019-07-18 RX ORDER — ALLOPURINOL 300 MG/1
300 TABLET ORAL DAILY
Qty: 30 TABLET | Refills: 1 | Status: CANCELLED | OUTPATIENT
Start: 2019-07-18

## 2019-07-18 RX ADMIN — BORTEZOMIB 2.4 MG: 3.5 INJECTION, POWDER, LYOPHILIZED, FOR SOLUTION INTRAVENOUS; SUBCUTANEOUS at 11:07

## 2019-07-18 NOTE — NURSING
Pt her today for Velcade injection. Received in abdomen. Tolerated well. VSS. Given calendar and AVS, reviewed appointments for next week. Verbalized understanding. No questions at this time.

## 2019-07-25 ENCOUNTER — INFUSION (OUTPATIENT)
Dept: INFUSION THERAPY | Facility: HOSPITAL | Age: 69
End: 2019-07-25
Attending: STUDENT IN AN ORGANIZED HEALTH CARE EDUCATION/TRAINING PROGRAM
Payer: MEDICARE

## 2019-07-25 ENCOUNTER — TELEPHONE (OUTPATIENT)
Dept: INTERNAL MEDICINE | Facility: CLINIC | Age: 69
End: 2019-07-25

## 2019-07-25 ENCOUNTER — OFFICE VISIT (OUTPATIENT)
Dept: HEMATOLOGY/ONCOLOGY | Facility: CLINIC | Age: 69
End: 2019-07-25
Payer: MEDICARE

## 2019-07-25 VITALS
TEMPERATURE: 98 F | HEART RATE: 73 BPM | RESPIRATION RATE: 18 BRPM | DIASTOLIC BLOOD PRESSURE: 85 MMHG | SYSTOLIC BLOOD PRESSURE: 129 MMHG

## 2019-07-25 VITALS
OXYGEN SATURATION: 96 % | BODY MASS INDEX: 29.72 KG/M2 | TEMPERATURE: 99 F | DIASTOLIC BLOOD PRESSURE: 86 MMHG | HEIGHT: 63 IN | SYSTOLIC BLOOD PRESSURE: 137 MMHG | HEART RATE: 85 BPM | WEIGHT: 167.75 LBS

## 2019-07-25 DIAGNOSIS — C90.00 MULTIPLE MYELOMA NOT HAVING ACHIEVED REMISSION: Primary | ICD-10-CM

## 2019-07-25 DIAGNOSIS — I69.30 HISTORY OF CVA WITH RESIDUAL DEFICIT: Primary | ICD-10-CM

## 2019-07-25 DIAGNOSIS — C90.00 MULTIPLE MYELOMA NOT HAVING ACHIEVED REMISSION: ICD-10-CM

## 2019-07-25 PROCEDURE — 99999 PR PBB SHADOW E&M-EST. PATIENT-LVL III: ICD-10-PCS | Mod: PBBFAC,HCNC,GC, | Performed by: STUDENT IN AN ORGANIZED HEALTH CARE EDUCATION/TRAINING PROGRAM

## 2019-07-25 PROCEDURE — 99999 PR PBB SHADOW E&M-EST. PATIENT-LVL III: CPT | Mod: PBBFAC,HCNC,GC, | Performed by: STUDENT IN AN ORGANIZED HEALTH CARE EDUCATION/TRAINING PROGRAM

## 2019-07-25 PROCEDURE — 96401 CHEMO ANTI-NEOPL SQ/IM: CPT | Mod: HCNC

## 2019-07-25 PROCEDURE — 63600175 PHARM REV CODE 636 W HCPCS: Mod: HCNC | Performed by: INTERNAL MEDICINE

## 2019-07-25 PROCEDURE — 99215 OFFICE O/P EST HI 40 MIN: CPT | Mod: HCNC,GC,S$GLB, | Performed by: STUDENT IN AN ORGANIZED HEALTH CARE EDUCATION/TRAINING PROGRAM

## 2019-07-25 PROCEDURE — 3075F PR MOST RECENT SYSTOLIC BLOOD PRESS GE 130-139MM HG: ICD-10-PCS | Mod: HCNC,CPTII,GC,S$GLB | Performed by: STUDENT IN AN ORGANIZED HEALTH CARE EDUCATION/TRAINING PROGRAM

## 2019-07-25 PROCEDURE — 1101F PR PT FALLS ASSESS DOC 0-1 FALLS W/OUT INJ PAST YR: ICD-10-PCS | Mod: HCNC,CPTII,GC,S$GLB | Performed by: STUDENT IN AN ORGANIZED HEALTH CARE EDUCATION/TRAINING PROGRAM

## 2019-07-25 PROCEDURE — 3079F PR MOST RECENT DIASTOLIC BLOOD PRESSURE 80-89 MM HG: ICD-10-PCS | Mod: HCNC,CPTII,GC,S$GLB | Performed by: STUDENT IN AN ORGANIZED HEALTH CARE EDUCATION/TRAINING PROGRAM

## 2019-07-25 PROCEDURE — 3075F SYST BP GE 130 - 139MM HG: CPT | Mod: HCNC,CPTII,GC,S$GLB | Performed by: STUDENT IN AN ORGANIZED HEALTH CARE EDUCATION/TRAINING PROGRAM

## 2019-07-25 PROCEDURE — 96365 THER/PROPH/DIAG IV INF INIT: CPT | Mod: HCNC

## 2019-07-25 PROCEDURE — 99215 PR OFFICE/OUTPT VISIT, EST, LEVL V, 40-54 MIN: ICD-10-PCS | Mod: HCNC,GC,S$GLB, | Performed by: STUDENT IN AN ORGANIZED HEALTH CARE EDUCATION/TRAINING PROGRAM

## 2019-07-25 PROCEDURE — 1101F PT FALLS ASSESS-DOCD LE1/YR: CPT | Mod: HCNC,CPTII,GC,S$GLB | Performed by: STUDENT IN AN ORGANIZED HEALTH CARE EDUCATION/TRAINING PROGRAM

## 2019-07-25 PROCEDURE — 3079F DIAST BP 80-89 MM HG: CPT | Mod: HCNC,CPTII,GC,S$GLB | Performed by: STUDENT IN AN ORGANIZED HEALTH CARE EDUCATION/TRAINING PROGRAM

## 2019-07-25 RX ORDER — BORTEZOMIB 3.5 MG/1
1.3 INJECTION, POWDER, LYOPHILIZED, FOR SOLUTION INTRAVENOUS; SUBCUTANEOUS
Status: CANCELLED | OUTPATIENT
Start: 2019-08-01

## 2019-07-25 RX ORDER — HEPARIN 100 UNIT/ML
500 SYRINGE INTRAVENOUS
Status: CANCELLED | OUTPATIENT
Start: 2019-08-08

## 2019-07-25 RX ORDER — SODIUM CHLORIDE 0.9 % (FLUSH) 0.9 %
10 SYRINGE (ML) INJECTION
Status: CANCELLED | OUTPATIENT
Start: 2019-08-15

## 2019-07-25 RX ORDER — HEPARIN 100 UNIT/ML
500 SYRINGE INTRAVENOUS
Status: CANCELLED | OUTPATIENT
Start: 2019-08-01

## 2019-07-25 RX ORDER — HEPARIN 100 UNIT/ML
500 SYRINGE INTRAVENOUS
Status: DISCONTINUED | OUTPATIENT
Start: 2019-07-25 | End: 2019-07-25 | Stop reason: HOSPADM

## 2019-07-25 RX ORDER — BORTEZOMIB 3.5 MG/1
1.3 INJECTION, POWDER, LYOPHILIZED, FOR SOLUTION INTRAVENOUS; SUBCUTANEOUS
Status: CANCELLED | OUTPATIENT
Start: 2019-08-08

## 2019-07-25 RX ORDER — BORTEZOMIB 3.5 MG/1
1.3 INJECTION, POWDER, LYOPHILIZED, FOR SOLUTION INTRAVENOUS; SUBCUTANEOUS
Status: COMPLETED | OUTPATIENT
Start: 2019-07-25 | End: 2019-07-25

## 2019-07-25 RX ORDER — BORTEZOMIB 3.5 MG/1
1.3 INJECTION, POWDER, LYOPHILIZED, FOR SOLUTION INTRAVENOUS; SUBCUTANEOUS
Status: CANCELLED | OUTPATIENT
Start: 2019-08-15

## 2019-07-25 RX ORDER — SODIUM CHLORIDE 0.9 % (FLUSH) 0.9 %
10 SYRINGE (ML) INJECTION
Status: DISCONTINUED | OUTPATIENT
Start: 2019-07-25 | End: 2019-07-25 | Stop reason: HOSPADM

## 2019-07-25 RX ORDER — SODIUM CHLORIDE 0.9 % (FLUSH) 0.9 %
10 SYRINGE (ML) INJECTION
Status: CANCELLED | OUTPATIENT
Start: 2019-08-01

## 2019-07-25 RX ORDER — SODIUM CHLORIDE 0.9 % (FLUSH) 0.9 %
10 SYRINGE (ML) INJECTION
Status: CANCELLED | OUTPATIENT
Start: 2019-08-08

## 2019-07-25 RX ORDER — LENALIDOMIDE 10 MG/1
CAPSULE ORAL
Refills: 0 | OUTPATIENT
Start: 2019-07-25

## 2019-07-25 RX ORDER — HEPARIN 100 UNIT/ML
500 SYRINGE INTRAVENOUS
Status: CANCELLED | OUTPATIENT
Start: 2019-08-15

## 2019-07-25 RX ADMIN — SODIUM CHLORIDE 4 MG: 9 INJECTION, SOLUTION INTRAVENOUS at 01:07

## 2019-07-25 RX ADMIN — BORTEZOMIB 2.4 MG: 3.5 INJECTION, POWDER, LYOPHILIZED, FOR SOLUTION INTRAVENOUS; SUBCUTANEOUS at 02:07

## 2019-07-25 NOTE — PROGRESS NOTES
PATIENT: Shelby Thompson  MRN: [id number]  DATE: 7/28/2019      Diagnosis:   1. Multiple myeloma not having achieved remission        Chief Complaint: Multiple myeloma not having achieved remission      Oncologic History:   Multiple Myeloma- presented w CRAB criteria (Hgb 7.1, hypercalcemia, renal function - Cr of 2.7, T7 vertebral fracture)  Kappa, RISS Stage III (beta-2 micro globulin of 17.5 and 14:20 translocation) High Risk disease   M-protein was 6.75, urine 600 mg of proteinbiopsy on 5/3 showed 70-80% plasma cells; k/l of 693.7, k of 548  CT on 5/15 results as above with left scapula lesion SUV 3, L5 vertebral body lesion SUV 4.7, stable T7 compression fracture and minimally displaced left lateral 4th rib fracture; no activity in previously noted in splenic or hepatic regions     VRD C1 completed on 5/23 - 6/13;l CrCl of 48 and so revilimid 10 mg; pt on acyclovir and aspirin 81, zometa-given on 5/.10 edentulous  Start C2 on 6/20  Currently on C3, D8 of Velcade      2 prior CVAs (one in 2008, one in 2011)  L breast cancer DCIS stage 0 (s/p lumpectomy and radiation, no endocrine tx due to CVA)  HTN  DM II  Neuropathy, b/l hands  Prior smoker, quit in 2011  Hepatic lesions - vascular per recent MRI in 05/2019; recs per hepatology conference to scans q3 mo anIntolerance   Statin Intolerance - on praluent, PCSK9 inhibitor   HFpEF - EF 55%, diastolic dysfunction  Anxiety/Depression        Oncologic History:   Pt is a 70 yo AAF with PMHx of COPD, two prior CVAs -one hemorhagic and one ishcemic (2008 and 2011, w residual defecitis and weaknessin R side/tremor on R hand), L Breast cancer s/p lumpectomy and radiation for stage 0 DCIS( in 2011, did not do endocrine tx due to CVA at that time), PVcs, DMII (not on current meds, prior A1c of 7.4), HTN, neuropathy of bilateral hands, liver lesions (which are possibly vascular per recent MRI in 05/2019), and hx of iron defeciency anemia, statin intolerance (on a PCSK9  inihibitor, alirocumab or praluent), anxiety/derpression, new onset Heart Failure, who presents to the clinic for further management of her recently diagnosed multiple myeloma. The pt states that she had not known about heart failure until this past admission, when she was note to have an EF of 55% and grade II diastolic dysfunction w elevated pulm artery pressure of 41; she was admitted to the hospital on 4/30/19 - 5/8/19 for an exacerbation of her worsening dyspnea over the prior two to three weeks as well as intermittent memory loss. She was noted to have O2 sats of 87% in her PCP, Dr. Arevalo's office and intiially was sent to the hospital in the evaluation for a PE. A CTA chest which was completed did not reveal any thrombus in pulmonary arteries but did reveal b/l ground glass opacities. At the time of her admission, she complained about pain under L breast and also pain in her L flank. PAtient does not recall if she had lost weight over the past few months but does think that she lost some weight in this last admission. Since leaving the hospitals, the patient has been more dependent on a rollator to move around. Also, the patient has had worsening nausea at home without any vomiting.      The patient had anemia to Hgb of 7.1, corrected calcium of 10.6, decreased renal function with Cr of 2.7, and a new T7 veterbral fracture. She currently has mid/low back pain and L flank pain.  Kappa was noted to be 548, with a k/l ratio of 693.7. Bone marrow biopsy completed on 5/3 revealed a plasma cell population of 70-80% for this patient. She has a PET-CT scheduled for 5/15. Beta2 microlglobulin was noted to be 17.5 and albumin of 2.5. Also, the patient was found to have an IgG kappa monoclonal protein  THe M-protein was noted to be 6.75 g/dL. Urine protein quantification is still pending.  The pt has had numbness /tingling in her hands post CVA, for which she is on gabapentin 300 mg BID.  The patient also had renal  failure initially which dropped to 1.4 yesterday; she is s/p four days of dexamethasone x 40 mg.         The patient had an EGD in 2016 which had revealed non-bleeding angioectasia and also a colonosopy in March 2015 which showed a 5 mm hyperplastic polyp. In addition, the patient has been on iron supplements, with ferrous gluconate 324 BID. During recent admission in 05/2019, The pt did receive 1 U of pRBC in the hospital. Her ferritin is 54; the pt has not required prior trasnsfusion and no history of IV iron use.  Also, the patient is still on allopurinol 300 mg for her suspected TLS noted in the hospital. Uric acid on her discharge was 8.2. Her Hgb was 7.8 on day of discharge for this paitnet.    IN the past, she had a referral to Hepatology for elevated liver enzymes and found to have liver lesions and had subsequent US guided liver biopsy in 2015, which did not reveal fibrosis and was otherwise normal in apparenace. The patient had negative serolologic testing for Gino's, alpha-1 antitrypsin defeciency, hemochromatosis, autoimmune hepatitis, hemochromatosis, and viral hepatiitis at that time. MRI abdomen/pelvis completed on 5/1/19 - pt was found to have multiple intedeterminate hepatic lesions and two additional hepatic lesions from prior ultrasound were suggestive of vascular malformations (CT had revealed hepatic lesions measuring 1.8 cm in hepatic segment 5 and also 1.3 cm in hepatic segment 8). The pt had a splenic lesion, which was noted to be about 2 cm on CT abdomen/pelvis on 4/10/19. Also, the patient had a 1 cm soft tissue lesion anterior to aorta and inferior to 3rd portion of duodenum. She had  thickened endometrium noted on pelvic ultrasound, which was not thought to require any further evaluation given no sx at time, when pt was evaluated by OB/GYN Dr. Teran.      For the heart failure, the ptis on lasix 20 mg daily for diuresis. A congo red stain was completed on BM biopsy and was negative.    She had COPD and quit smokling in 2011, after smoking about 1/2 pack a day for about 42 years. She does not have any regular use of inhalers and does not keep any at home. She is currently on baby aspirin at home. Pt is edentulous.    The patient is seen with her  and also her daughter, Jessica. Also, the patient has one sister with breast cancer and one sister with lung cancer; there is no hematologic malignancy in the family. She is a retired .     Subjective:     The pt had increased frequency of stools (NOT diarrhea), up to 5/day on current regimen;the pt has had this continuously throughout the cycle.     She has had prior neuropathy, worse in R hand but she feels that this has been very stable. Over past week, she has had bilateral ankle swelling; she has been using compression stockings and trying to stay off her feet more. However, her left leg feels like it more tight with fluid as well. Still using cane/rollator to ambulate.    Continue acylcovir and zometa.       Past Medical History:   Past Medical History:   Diagnosis Date    Acute respiratory failure with hypoxia 4/30/2019    FUENTES (acute kidney injury) 5/1/2019    Allergy     Anemia     Aortic aneurysm     Breast cancer 10/2011    left breast Stage 0 DCIS    Chronic diastolic congestive heart failure 11/6/2015    Colon polyp     GERD (gastroesophageal reflux disease)     History of colonic polyps     HX: breast cancer     Hyperlipemia     Hypertension     ICH (intracerebral hemorrhage)     Major depressive disorder, single episode, mild 6/23/2016    Nuclear sclerosis 7/21/2014    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 7/21/2014    PAD (peripheral artery disease) 11/6/2015    Statin-induced rhabdomyolysis     4/2015     Stroke 4/2011       Past Surgical HIstory:   Past Surgical History:   Procedure Laterality Date    APPENDECTOMY      BIOPSY LIVER AND ULTRASOUND N/A 4/6/2015    Performed by  [facility] OR 2ND FLR    BREAST BIOPSY Left 10/2011    BREAST LUMPECTOMY Left 2011    DCIS    COLONOSCOPY      COLONOSCOPY N/A 3/26/2015    Performed by Wicho Woodard MD at [facility] ENDO (4TH FLR)    CYST REMOVAL      back    ESOPHAGOGASTRODUODENOSCOPY  07/2016    duodenal angioectasia    ESOPHAGOGASTRODUODENOSCOPY (EGD) N/A 7/27/2016    Performed by Malik Sanchez MD at [facility] ENDO (4TH FLR)    FOOT FRACTURE SURGERY  10/2012    right foot, with R hallux valgus repair    FRACTURE SURGERY Right     broken toe repiar    HEMORRHOID SURGERY      LIVER BIOPSY  04/2015    essentially normal, no fibrosis    TUBAL LIGATION      UPPER GASTROINTESTINAL ENDOSCOPY         Family History:   Family History   Problem Relation Age of Onset    No Known Problems Sister     Cancer Sister 63        Lung Cancer    No Known Problems Mother     Cancer Father     No Known Problems Brother     Hypertension Daughter     Fibroids Daughter         uterine    Hypertension Son     Breast cancer Sister 62    No Known Problems Brother     No Known Problems Maternal Aunt     No Known Problems Maternal Uncle     No Known Problems Paternal Aunt     No Known Problems Paternal Uncle     Hypertension Maternal Grandmother     No Known Problems Maternal Grandfather     No Known Problems Paternal Grandmother     No Known Problems Paternal Grandfather     Ovarian cancer Neg Hx     Colon cancer Neg Hx     Tremor Neg Hx     Amblyopia Neg Hx     Blindness Neg Hx     Cataracts Neg Hx     Diabetes Neg Hx     Glaucoma Neg Hx     Macular degeneration Neg Hx     Retinal detachment Neg Hx     Strabismus Neg Hx     Stroke Neg Hx     Thyroid disease Neg Hx     Esophageal cancer Neg Hx     Rectal cancer Neg Hx     Stomach cancer Neg Hx     Ulcerative colitis Neg Hx     Crohn's disease Neg Hx     Irritable bowel syndrome Neg Hx     Celiac disease Neg Hx        Social History:  reports that she quit  smoking about 8 years ago. Her smoking use included cigarettes. She has a 10.00 pack-year smoking history. She quit smokeless tobacco use about 8 years ago. She reports that she drinks alcohol. She reports that she does not use drugs.    Allergies:  Review of patient's allergies indicates:   Allergen Reactions    Lipitor [atorvastatin]      Itching, elevated cpk, muscle aches, statin induced myositis       Medications:  Current Outpatient Medications   Medication Sig Dispense Refill    acetaminophen (TYLENOL) 650 MG TbSR Take 1,300 mg by mouth daily as needed (pain).      acyclovir (ZOVIRAX) 400 MG tablet Take 1 tablet (400 mg total) by mouth 2 (two) times daily. 120 tablet 2    alirocumab (PRALUENT PEN) 75 mg/mL PnIj Inject 1 mL (75 mg total) into the skin every 14 (fourteen) days. Ask your PCP/ Oncologist 2 mL 11    allopurinol (ZYLOPRIM) 300 MG tablet Take 1 tablet (300 mg total) by mouth once daily. 30 tablet 1    amLODIPine (NORVASC) 5 MG tablet TAKE 1 TABLET BY MOUTH EVERY DAY 90 tablet 3    aspirin 81 mg Tab Take 1 tablet by mouth Daily.      bisacodyl (DULCOLAX) 5 mg EC tablet Take 5 mg by mouth once daily.      dexAMETHasone (DECADRON) 4 MG Tab Take 10 tablets (40 mg total) by mouth every 7 days. Take once a week on same day as Velcade. 40 tablet 0    ferrous gluconate 324 mg (37.5 mg iron) Tab TAKE 1 TABLET BY MOUTH 2 (TWO) TIMES DAILY WITH MEALS. 180 tablet 0    gabapentin (NEURONTIN) 300 MG capsule TAKE 1 CAPSULE (300 MG TOTAL) BY MOUTH 2 (TWO) TIMES DAILY. 180 capsule 3    lenalidomide (REVLIMID) 10 mg Cap Take 10 mg by mouth once daily Take days 1-21 and then off 7 days.  auth # [id number] on 7/3/19. 21 each 0    omeprazole (PRILOSEC) 40 MG capsule TAKE 1 CAPSULE (40 MG TOTAL) BY MOUTH ONCE DAILY. 90 capsule 3    ondansetron (ZOFRAN) 4 MG tablet Take 1 tablet (4 mg total) by mouth every 6 (six) hours as needed for Nausea. 30 tablet 1    ondansetron (ZOFRAN-ODT) 8 MG TbDL Take 1 tablet (8 mg  "total) by mouth every 8 (eight) hours as needed (nausea/vomiting). 21 tablet 0    sertraline (ZOLOFT) 50 MG tablet TAKE 1 TABLET (50 MG TOTAL) BY MOUTH ONCE DAILY. 90 tablet 3    traMADol (ULTRAM) 50 mg tablet Take 1 tablet (50 mg total) by mouth 2 (two) times daily as needed for Pain. 60 tablet 3    vitamin D 1000 units Tab Take 185 mg by mouth once daily.       Current Facility-Administered Medications   Medication Dose Route Frequency Provider Last Rate Last Dose    zoledronic acid (ZOMETA) 4 mg in sodium chloride 0.9% 100 mL IVPB  4 mg Intravenous 1 time in Clinic/HOD Genny Napoles MD           Review of Systems   Constitutional: Positive for chills and fatigue.   Respiratory: Negative for cough.    Cardiovascular: Negative for chest pain and palpitations.   Gastrointestinal: Positive for diarrhea and nausea. Negative for abdominal pain.   Genitourinary: Negative for difficulty urinating and dysuria.   Musculoskeletal: Positive for back pain and gait problem.   Skin: Negative for rash.   Neurological: Positive for tremors, weakness and numbness. Negative for headaches.   Hematological: Negative for adenopathy.       ECOG Performance Status: 1   Objective:      Vitals:   Vitals:    07/25/19 1143   BP: 137/86   Pulse: 85   Temp: 99.2 °F (37.3 °C)   SpO2: 96%   Weight: 76.1 kg (167 lb 12.3 oz)   Height: 5' 3" (1.6 m)     BMI: Body mass index is 29.72 kg/m².    Physical Exam   Constitutional: She is oriented to person, place, and time. She appears well-developed and well-nourished. No distress.   HENT:   Head: Normocephalic and atraumatic.   Mouth/Throat: Oropharynx is clear and moist.   Eyes: Pupils are equal, round, and reactive to light. Conjunctivae and EOM are normal.   Neck: Normal range of motion. Neck supple.   Cardiovascular: Normal rate, regular rhythm and normal heart sounds. Exam reveals no gallop and no friction rub.   No murmur heard.  Pulmonary/Chest: Effort normal and breath sounds normal. No " respiratory distress. She has no wheezes. She has no rales.   Abdominal: Soft. Bowel sounds are normal. She exhibits no distension. There is no tenderness. There is no guarding.   Musculoskeletal: Normal range of motion. She exhibits no edema.   Lymphadenopathy:     She has no cervical adenopathy.   Neurological: She is alert and oriented to person, place, and time. No cranial nerve deficit.   Intentional R hand tremor; RUE - 4/5 strength and RLE extremity also 4/5; full strengths on LUE and LLE   Skin: Skin is warm and dry. No rash noted.       Laboratory Data:  Lab Visit on 07/25/2019   Component Date Value Ref Range Status    Sodium 07/25/2019 141  136 - 145 mmol/L Final    Potassium 07/25/2019 3.7  3.5 - 5.1 mmol/L Final    Chloride 07/25/2019 104  95 - 110 mmol/L Final    CO2 07/25/2019 29  23 - 29 mmol/L Final    Glucose 07/25/2019 86  70 - 110 mg/dL Final    BUN, Bld 07/25/2019 10  8 - 23 mg/dL Final    Creatinine 07/25/2019 0.8  0.5 - 1.4 mg/dL Final    Calcium 07/25/2019 9.0  8.7 - 10.5 mg/dL Final    Total Protein 07/25/2019 7.1  6.0 - 8.4 g/dL Final    Albumin 07/25/2019 3.5  3.5 - 5.2 g/dL Final    Total Bilirubin 07/25/2019 0.5  0.1 - 1.0 mg/dL Final    Comment: For infants and newborns, interpretation of results should be based  on gestational age, weight and in agreement with clinical  observations.  Premature Infant recommended reference ranges:  Up to 24 hours.............<8.0 mg/dL  Up to 48 hours............<12.0 mg/dL  3-5 days..................<15.0 mg/dL  6-29 days.................<15.0 mg/dL      Alkaline Phosphatase 07/25/2019 105  55 - 135 U/L Final    AST 07/25/2019 25  10 - 40 U/L Final    ALT 07/25/2019 23  10 - 44 U/L Final    Anion Gap 07/25/2019 8  8 - 16 mmol/L Final    eGFR if African American 07/25/2019 >60.0  >60 mL/min/1.73 m^2 Final    eGFR if non African American 07/25/2019 >60.0  >60 mL/min/1.73 m^2 Final    Comment: Calculation used to obtain the estimated  glomerular filtration  rate (eGFR) is the CKD-EPI equation.       Protein, Serum 07/25/2019 6.5  6.0 - 8.4 g/dL Final    Comment: Serum protein electrophoresis and immunofixation results should be   interpreted in clinical context in that some therapeutic agents can   result   in false positive results (example, daratumumab). Correlation with   the   patient s therapeutic regimen is required.      Albumin grams/dl 07/25/2019 3.59  3.35 - 5.55 g/dL Final    Alpha-1 grams/dl 07/25/2019 0.34  0.17 - 0.41 g/dL Final    Alpha-2 grams/dl 07/25/2019 0.79  0.43 - 0.99 g/dL Final    Beta grams/dl 07/25/2019 0.77  0.50 - 1.10 g/dL Final    Gamma grams/dl 07/25/2019 1.02  0.67 - 1.58 g/dL Final    Immunofix Interp. 07/25/2019 SEE COMMENT   Final    Comment: Serum protein electrophoresis and immunofixation results should be   interpreted in clinical context in that some therapeutic agents can   result   in false positive results (example, daratumumab). Correlation with   the   patient s therapeutic regimen is required.  See pathologist's interpretation.      IgG - Serum 07/25/2019 1190  650 - 1600 mg/dL Final    IgG Cord Blood Reference Range: 650-1600 mg/dL.    IgA 07/25/2019 38* 40 - 350 mg/dL Final    IgA Cord Blood Reference Range: <5 mg/dL.    IgM 07/25/2019 26* 50 - 300 mg/dL Final    IgM Cord Blood Reference Range: <25 mg/dL.    Kappa Free Light Chains 07/25/2019 1.80  0.33 - 1.94 mg/dL Final    Lambda Free Light Chains 07/25/2019 1.15  0.57 - 2.63 mg/dL Final    Kappa/Lambda FLC Ratio 07/25/2019 1.57  0.26 - 1.65 Final    WBC 07/25/2019 4.89  3.90 - 12.70 K/uL Final    RBC 07/25/2019 4.59  4.00 - 5.40 M/uL Final    Hemoglobin 07/25/2019 10.4* 12.0 - 16.0 g/dL Final    Hematocrit 07/25/2019 37.0  37.0 - 48.5 % Final    Mean Corpuscular Volume 07/25/2019 81* 82 - 98 fL Final    Mean Corpuscular Hemoglobin 07/25/2019 22.7* 27.0 - 31.0 pg Final    Mean Corpuscular Hemoglobin Conc 07/25/2019 28.1* 32.0 -  36.0 g/dL Final    RDW 07/25/2019 22.1* 11.5 - 14.5 % Final    Platelets 07/25/2019 190  150 - 350 K/uL Final    MPV 07/25/2019 SEE COMMENT  9.2 - 12.9 fL Final    Result not available.    Immature Granulocytes 07/25/2019 0.2  0.0 - 0.5 % Final    Gran # (ANC) 07/25/2019 3.7  1.8 - 7.7 K/uL Final    Immature Grans (Abs) 07/25/2019 0.01  0.00 - 0.04 K/uL Final    Comment: Mild elevation in immature granulocytes is non specific and   can be seen in a variety of conditions including stress response,   acute inflammation, trauma and pregnancy. Correlation with other   laboratory and clinical findings is essential.      Lymph # 07/25/2019 0.5* 1.0 - 4.8 K/uL Final    Mono # 07/25/2019 0.4  0.3 - 1.0 K/uL Final    Eos # 07/25/2019 0.3  0.0 - 0.5 K/uL Final    Baso # 07/25/2019 0.02  0.00 - 0.20 K/uL Final    nRBC 07/25/2019 0  0 /100 WBC Final    Gran% 07/25/2019 75.1* 38.0 - 73.0 % Final    Lymph% 07/25/2019 10.2* 18.0 - 48.0 % Final    Mono% 07/25/2019 8.0  4.0 - 15.0 % Final    Eosinophil% 07/25/2019 6.1  0.0 - 8.0 % Final    Basophil% 07/25/2019 0.4  0.0 - 1.9 % Final    Differential Method 07/25/2019 Automated   Final    Pathologist Interpretation NATALY 07/25/2019 REVIEWED   Final    Comment: Electronically reviewed and signed by:  Shelli Haynes MD  Signed on 07/26/19 at 14:25  IgG kappa specific monoclonal band present.      Pathologist Interpretation SPE 07/25/2019 REVIEWED   Final    Comment: Electronically reviewed and signed by:  Shelli Haynes MD  Signed on 07/26/19 at 14:26  Normal total protein. Normal gamma globulins are decreased.   There is a paraprotein band in gamma measuring 0.55 g/dL (1.94 g/dL   previously)            Imaging:  Assessment:       1. Multiple myeloma not having achieved remission           Plan:     Multiple Myeloma  - ECOG PS 1  - pt met most of Crab criteria in hospital - had anemia (hgb 7.1) corrected calcium of 10.6, renal function with Cr of 2.7 and T7  verterbral fracture  - Kappa was noted to be 548, with a k/l ratio of 693.7.  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  At  diagnosis, this translocation has an unfavorable prognosis in multiple myeloma (Correa et al., Blood [id number], 2003).  - 14:20 translocation will place this as R-ISS Stage III, high risk disease  - Bone marrow biopsy completed on 5/3 revealed a plasma cell population of 70-80% for this patient  -  PET-CT on 5/15 results as above with left scapula lesion SUV 3, L5 vertebral body lesion SUV 4.7, stable T7 compression fracture and minimally displaced left lateral 4th rib fracture; no activity in previously noted in splenic or hepatic regions   -SPEP reveals M-protein was noted to be 6.75 g/dL, NATALY reveals IgG kappa monoclonal protein    - Urine protein 24h quantification, 663 mg   - Cr of 0.8 today, s/p four doses of dexamethasone 40 mg, w last dose on 5/9  - Plan for velcade 1.3 mg/m2 weekly on days 1,8.15.22 for 28 day cycles, also will plan to dose dexamethasone 40 mg weekly as well on days of velcade shots; currently revilimid at 10 mg/day  - Can dose revilmid to 25 mg a day; decrease dexamethasone to 20 mg a day  - went over risks of neuropathy, blood clots, VZV reactivation, and GI symptoms such as nausea, diarrhea and pt also asked to call us and/or come to ER for new fevers greater than 100.4F or new onset brusing bleeding. Pt agrees to proceed with therapy and has had  sign consent form due to limited dexterity with R hand  -  continue aspirin 81 mg daily; continue acyclovir 400 BID, discussed risks of shingles if this medication is not included in regimen  - Hgb of 10.4 today   - plan for zometa monthly, no need for dental eval as she has no current known jaw breakdown and is edentulous; can do 4 mg monthly  - C1 of VRD 5/23 -  6/19  - C2 of VRD - starting now on 6/20   Currently on C3, D8 of Velcade  - free light chain ratio on 1.8 kappa /1.15 - 1.57 overall    Lower extremity edema, worse on L leg  - DVT has been ruled out  - decrease dexmethasone to 20 mg  - continue compression stokcings    Increased stool frequency (not diarrhea)  - mentioned possibly using pepto-bismol but pt would like to monitor for now    Nausea/ vomiting  - controlled on zofran at first sign of nausea  - will add secondary medications for additional control too if this becomes worse or uncontrolled      Malnutrition/loss of appetite  - discussed with SW about arranging pt with boost/ensure      Anemia  - low ferritin in years past 4-8, currently is 54 on 4/30/19 while on iron supplementation  - prior GI eval in 2015 and 16  - will continue to monitor for worsening anemia and signs sx of bleeding     FUENTES  - 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 Rabago     F/u:   Follow-up patient back in a month with CBC, CMP, Mg, Phos,  Uric acid, free light chains, SPEP, NATALY, quant immunoglobulins checked prior to visit. She will continue to require chair for weekly Velcade, starting on the day of clinic visit. Patient will also need zometa monthly.       Genny Napoles MD  Hematology and Oncology Fellow, PGY V  [facility]

## 2019-07-25 NOTE — Clinical Note
Follow-up patient back in a four weeks with CBC, CMP, Mg, Phos,  Uric acid, free light chains, SPEP, NATALY, quant immunoglobulins checked prior to visit. She will continue to require chair for weekly Velcade, starting from next week until day of next visit in a month, thanks.

## 2019-07-25 NOTE — Clinical Note
Let's get an ultrasound of bilateral lower extremities STAT today please, we will need to rule out DVT. Please call patient with appointment time.

## 2019-07-25 NOTE — Clinical Note
Scooter Rabago.....No clot in Ms. Thompson's legs, can you help me increase her revilimid dose to 25 mg/daily? I'm not registered with [employer] yet....

## 2019-07-25 NOTE — PLAN OF CARE
Problem: Adult Inpatient Plan of Care  Goal: Absence of Hospital-Acquired Illness or Injury    Intervention: Prevent Infection  Reports compliance with home medication regimen. Tolerated zometa infusion well no adverse reaction, velcade inj given SQ to abd. Site covered with bandaide tolerated well. PIV d/c post infusion cath tip intact site covered with 2x2 gauze and co wrap. avs printed and reviewed. Scheduled to rtn on 7/26/19 for us at [facility]. Instructed to contact providers clinic with questions. Leaves clinic in w/c no distress accompanied by friend.

## 2019-07-26 ENCOUNTER — HOSPITAL ENCOUNTER (OUTPATIENT)
Dept: RADIOLOGY | Facility: HOSPITAL | Age: 69
Discharge: HOME | End: 2019-07-26
Attending: STUDENT IN AN ORGANIZED HEALTH CARE EDUCATION/TRAINING PROGRAM
Payer: MEDICARE

## 2019-07-26 PROCEDURE — 93970 EXTREMITY STUDY: CPT | Mod: 26,HCNC,, | Performed by: RADIOLOGY

## 2019-07-26 PROCEDURE — 93970 US LOWER EXTREMITY VEINS BILATERAL: ICD-10-PCS | Mod: 26,HCNC,, | Performed by: RADIOLOGY

## 2019-07-26 PROCEDURE — 93970 EXTREMITY STUDY: CPT | Mod: TC,HCNC

## 2019-07-31 ENCOUNTER — PATIENT MESSAGE (OUTPATIENT)
Dept: HEMATOLOGY/ONCOLOGY | Facility: CLINIC | Age: 69
End: 2019-07-31

## 2019-07-31 DIAGNOSIS — C90.00 MULTIPLE MYELOMA NOT HAVING ACHIEVED REMISSION: ICD-10-CM

## 2019-07-31 RX ORDER — LENALIDOMIDE 10 MG/1
10 CAPSULE ORAL DAILY
Qty: 21 EACH | Refills: 0 | Status: SHIPPED | OUTPATIENT
Start: 2019-07-31 | End: 2019-08-18 | Stop reason: SDUPTHER

## 2019-08-01 ENCOUNTER — INFUSION (OUTPATIENT)
Dept: INFUSION THERAPY | Facility: HOSPITAL | Age: 69
End: 2019-08-01
Attending: STUDENT IN AN ORGANIZED HEALTH CARE EDUCATION/TRAINING PROGRAM
Payer: MEDICARE

## 2019-08-01 VITALS
DIASTOLIC BLOOD PRESSURE: 78 MMHG | SYSTOLIC BLOOD PRESSURE: 124 MMHG | TEMPERATURE: 98 F | HEART RATE: 89 BPM | RESPIRATION RATE: 89 BRPM

## 2019-08-01 DIAGNOSIS — C90.00 MULTIPLE MYELOMA NOT HAVING ACHIEVED REMISSION: Primary | ICD-10-CM

## 2019-08-01 PROCEDURE — 96401 CHEMO ANTI-NEOPL SQ/IM: CPT | Mod: HCNC

## 2019-08-01 PROCEDURE — 63600175 PHARM REV CODE 636 W HCPCS: Mod: HCNC,JG | Performed by: INTERNAL MEDICINE

## 2019-08-01 RX ORDER — BORTEZOMIB 3.5 MG/1
1.3 INJECTION, POWDER, LYOPHILIZED, FOR SOLUTION INTRAVENOUS; SUBCUTANEOUS
Status: COMPLETED | OUTPATIENT
Start: 2019-08-01 | End: 2019-08-01

## 2019-08-01 RX ADMIN — BORTEZOMIB 2.4 MG: 3.5 INJECTION, POWDER, LYOPHILIZED, FOR SOLUTION INTRAVENOUS; SUBCUTANEOUS at 12:08

## 2019-08-01 NOTE — NURSING
Pt arrived in wheelchair for velcade.  Pt tolerated injection SQ to LLA.  Discharged to home with appt calendar.

## 2019-08-02 RX ORDER — OMEPRAZOLE 40 MG/1
40 CAPSULE, DELAYED RELEASE ORAL DAILY
Qty: 90 CAPSULE | Refills: 0 | Status: SHIPPED | OUTPATIENT
Start: 2019-08-02 | End: 2019-11-03 | Stop reason: SDUPTHER

## 2019-08-08 ENCOUNTER — INFUSION (OUTPATIENT)
Dept: INFUSION THERAPY | Facility: HOSPITAL | Age: 69
End: 2019-08-08
Attending: INTERNAL MEDICINE
Payer: MEDICARE

## 2019-08-08 VITALS
RESPIRATION RATE: 18 BRPM | HEART RATE: 83 BPM | DIASTOLIC BLOOD PRESSURE: 60 MMHG | TEMPERATURE: 99 F | SYSTOLIC BLOOD PRESSURE: 120 MMHG

## 2019-08-08 DIAGNOSIS — C90.00 MULTIPLE MYELOMA NOT HAVING ACHIEVED REMISSION: Primary | ICD-10-CM

## 2019-08-08 PROCEDURE — 63600175 PHARM REV CODE 636 W HCPCS: Mod: HCNC,JG | Performed by: INTERNAL MEDICINE

## 2019-08-08 PROCEDURE — 96401 CHEMO ANTI-NEOPL SQ/IM: CPT | Mod: HCNC

## 2019-08-08 RX ORDER — HEPARIN 100 UNIT/ML
500 SYRINGE INTRAVENOUS
Status: CANCELLED | OUTPATIENT
Start: 2019-08-08

## 2019-08-08 RX ORDER — SODIUM CHLORIDE 0.9 % (FLUSH) 0.9 %
10 SYRINGE (ML) INJECTION
Status: CANCELLED | OUTPATIENT
Start: 2019-08-08

## 2019-08-08 RX ORDER — BORTEZOMIB 3.5 MG/1
1.3 INJECTION, POWDER, LYOPHILIZED, FOR SOLUTION INTRAVENOUS; SUBCUTANEOUS
Status: COMPLETED | OUTPATIENT
Start: 2019-08-08 | End: 2019-08-08

## 2019-08-08 RX ADMIN — BORTEZOMIB 2.4 MG: 3.5 INJECTION, POWDER, LYOPHILIZED, FOR SOLUTION INTRAVENOUS; SUBCUTANEOUS at 12:08

## 2019-08-08 NOTE — NURSING
Received Velcade sq injection without difficulty.  RTC as scheduled.  NAD noted upon discharge via w/c.

## 2019-08-10 NOTE — ED NOTES
Pt is resting comfortably in stretcher with eyes closed.  Respirations are full, even, and non labored. NAD noted.  Call bell is in reach, side rails are up x 2, and bed is in lowest, locked, position. Will continue to monitor.     
 telebox #[id number] applied to patient.   
Ed resident at bedside.    
MD at bedside.  
MD at bedside.   
Patient assisted to restroom per RN; steady gait noted.   
Patient given urine cup, patient verbalized understanding on how to provide sample. Patient instructed to bring cup back to room with patient.    
Patient placed on continuous cardiac monitor, automatic blood pressure cuff and continuous pulse oximeter.  
Patient resting in stretcher with NAD noted. VSS, following commands, and speech clear and appropriate. All belongings within reach. Patient denies any pain at this time. Patient updated on plan of care and all questions addressed. Safety precautions remain in place.     
Pt is resting comfortably in stretcher with eyes closed.  Respirations are full, even, and non labored. NAD noted.  Call bell is in reach, side rails are up x 2, and bed is in lowest, locked, position. Will continue to monitor.  
Report received and care assumed. Pt given blankets upon request. No other needs at this time. Pt and  updated on POC, verbalizes understanding.  
Report received from ABDOUL Yuan and care assumed at this time.   
110

## 2019-08-15 ENCOUNTER — INFUSION (OUTPATIENT)
Dept: INFUSION THERAPY | Facility: HOSPITAL | Age: 69
End: 2019-08-15
Attending: STUDENT IN AN ORGANIZED HEALTH CARE EDUCATION/TRAINING PROGRAM
Payer: MEDICARE

## 2019-08-15 VITALS
DIASTOLIC BLOOD PRESSURE: 79 MMHG | BODY MASS INDEX: 29.72 KG/M2 | RESPIRATION RATE: 18 BRPM | WEIGHT: 167.75 LBS | HEIGHT: 63 IN | TEMPERATURE: 99 F | SYSTOLIC BLOOD PRESSURE: 132 MMHG | HEART RATE: 79 BPM

## 2019-08-15 DIAGNOSIS — C90.00 MULTIPLE MYELOMA NOT HAVING ACHIEVED REMISSION: Primary | ICD-10-CM

## 2019-08-15 PROCEDURE — 63600175 PHARM REV CODE 636 W HCPCS: Mod: JG,HCNC | Performed by: INTERNAL MEDICINE

## 2019-08-15 PROCEDURE — 96401 CHEMO ANTI-NEOPL SQ/IM: CPT | Mod: HCNC

## 2019-08-15 RX ORDER — HEPARIN 100 UNIT/ML
500 SYRINGE INTRAVENOUS
Status: CANCELLED | OUTPATIENT
Start: 2019-09-05

## 2019-08-15 RX ORDER — HEPARIN 100 UNIT/ML
500 SYRINGE INTRAVENOUS
Status: CANCELLED | OUTPATIENT
Start: 2019-09-19

## 2019-08-15 RX ORDER — SODIUM CHLORIDE 0.9 % (FLUSH) 0.9 %
10 SYRINGE (ML) INJECTION
Status: CANCELLED | OUTPATIENT
Start: 2019-09-12

## 2019-08-15 RX ORDER — BORTEZOMIB 3.5 MG/1
1.3 INJECTION, POWDER, LYOPHILIZED, FOR SOLUTION INTRAVENOUS; SUBCUTANEOUS
Status: COMPLETED | OUTPATIENT
Start: 2019-08-15 | End: 2019-08-15

## 2019-08-15 RX ORDER — BORTEZOMIB 3.5 MG/1
1.3 INJECTION, POWDER, LYOPHILIZED, FOR SOLUTION INTRAVENOUS; SUBCUTANEOUS
Status: CANCELLED | OUTPATIENT
Start: 2019-09-05

## 2019-08-15 RX ORDER — SODIUM CHLORIDE 0.9 % (FLUSH) 0.9 %
10 SYRINGE (ML) INJECTION
Status: CANCELLED | OUTPATIENT
Start: 2019-09-05

## 2019-08-15 RX ORDER — SODIUM CHLORIDE 0.9 % (FLUSH) 0.9 %
10 SYRINGE (ML) INJECTION
Status: CANCELLED | OUTPATIENT
Start: 2019-09-19

## 2019-08-15 RX ORDER — BORTEZOMIB 3.5 MG/1
1.3 INJECTION, POWDER, LYOPHILIZED, FOR SOLUTION INTRAVENOUS; SUBCUTANEOUS
Status: CANCELLED | OUTPATIENT
Start: 2019-09-12

## 2019-08-15 RX ORDER — HEPARIN 100 UNIT/ML
500 SYRINGE INTRAVENOUS
Status: CANCELLED | OUTPATIENT
Start: 2019-09-12

## 2019-08-15 RX ORDER — BORTEZOMIB 3.5 MG/1
1.3 INJECTION, POWDER, LYOPHILIZED, FOR SOLUTION INTRAVENOUS; SUBCUTANEOUS
Status: CANCELLED | OUTPATIENT
Start: 2019-09-19

## 2019-08-15 RX ADMIN — BORTEZOMIB 2.4 MG: 3.5 INJECTION, POWDER, LYOPHILIZED, FOR SOLUTION INTRAVENOUS; SUBCUTANEOUS at 12:08

## 2019-08-18 DIAGNOSIS — C90.00 MULTIPLE MYELOMA NOT HAVING ACHIEVED REMISSION: ICD-10-CM

## 2019-08-19 RX ORDER — LENALIDOMIDE 10 MG/1
CAPSULE ORAL
Qty: 21 EACH | Refills: 0 | Status: SHIPPED | OUTPATIENT
Start: 2019-08-19 | End: 2019-08-22 | Stop reason: DRUGHIGH

## 2019-08-20 ENCOUNTER — TELEPHONE (OUTPATIENT)
Dept: HEMATOLOGY/ONCOLOGY | Facility: CLINIC | Age: 69
End: 2019-08-20

## 2019-08-20 DIAGNOSIS — C90.00 MULTIPLE MYELOMA NOT HAVING ACHIEVED REMISSION: Primary | ICD-10-CM

## 2019-08-20 RX ORDER — DEXAMETHASONE 4 MG/1
20 TABLET ORAL WEEKLY
Qty: 60 TABLET | Refills: 0 | Status: SHIPPED | OUTPATIENT
Start: 2019-08-20 | End: 2019-11-12

## 2019-08-22 ENCOUNTER — INFUSION (OUTPATIENT)
Dept: INFUSION THERAPY | Facility: HOSPITAL | Age: 69
End: 2019-08-22
Attending: STUDENT IN AN ORGANIZED HEALTH CARE EDUCATION/TRAINING PROGRAM
Payer: MEDICARE

## 2019-08-22 ENCOUNTER — OFFICE VISIT (OUTPATIENT)
Dept: HEMATOLOGY/ONCOLOGY | Facility: CLINIC | Age: 69
End: 2019-08-22
Payer: MEDICARE

## 2019-08-22 VITALS
SYSTOLIC BLOOD PRESSURE: 127 MMHG | RESPIRATION RATE: 18 BRPM | HEART RATE: 84 BPM | DIASTOLIC BLOOD PRESSURE: 68 MMHG | TEMPERATURE: 98 F

## 2019-08-22 VITALS
OXYGEN SATURATION: 96 % | SYSTOLIC BLOOD PRESSURE: 140 MMHG | WEIGHT: 167.31 LBS | BODY MASS INDEX: 30.79 KG/M2 | DIASTOLIC BLOOD PRESSURE: 88 MMHG | HEIGHT: 62 IN | HEART RATE: 79 BPM | RESPIRATION RATE: 16 BRPM | TEMPERATURE: 98 F

## 2019-08-22 DIAGNOSIS — C90.00 MULTIPLE MYELOMA, REMISSION STATUS UNSPECIFIED: Primary | ICD-10-CM

## 2019-08-22 DIAGNOSIS — C90.00 MULTIPLE MYELOMA NOT HAVING ACHIEVED REMISSION: Primary | ICD-10-CM

## 2019-08-22 DIAGNOSIS — C90.00 MULTIPLE MYELOMA NOT HAVING ACHIEVED REMISSION: ICD-10-CM

## 2019-08-22 DIAGNOSIS — D49.2 BONE NEOPLASM: ICD-10-CM

## 2019-08-22 LAB
ALBUMIN SERPL BCP-MCNC: 3.9 G/DL (ref 3.5–5.2)
ALP SERPL-CCNC: 84 U/L (ref 55–135)
ALT SERPL W/O P-5'-P-CCNC: 13 U/L (ref 10–44)
ANION GAP SERPL CALC-SCNC: 11 MMOL/L (ref 8–16)
AST SERPL-CCNC: 15 U/L (ref 10–40)
BILIRUB SERPL-MCNC: 0.4 MG/DL (ref 0.1–1)
BUN SERPL-MCNC: 10 MG/DL (ref 8–23)
CALCIUM SERPL-MCNC: 9.2 MG/DL (ref 8.7–10.5)
CHLORIDE SERPL-SCNC: 107 MMOL/L (ref 95–110)
CO2 SERPL-SCNC: 25 MMOL/L (ref 23–29)
CREAT SERPL-MCNC: 0.9 MG/DL (ref 0.5–1.4)
EST. GFR  (AFRICAN AMERICAN): >60 ML/MIN/1.73 M^2
EST. GFR  (NON AFRICAN AMERICAN): >60 ML/MIN/1.73 M^2
GLUCOSE SERPL-MCNC: 136 MG/DL (ref 70–110)
POTASSIUM SERPL-SCNC: 3.5 MMOL/L (ref 3.5–5.1)
PROT SERPL-MCNC: 7.3 G/DL (ref 6–8.4)
SODIUM SERPL-SCNC: 143 MMOL/L (ref 136–145)

## 2019-08-22 PROCEDURE — 3079F DIAST BP 80-89 MM HG: CPT | Mod: HCNC,CPTII,GC,S$GLB | Performed by: STUDENT IN AN ORGANIZED HEALTH CARE EDUCATION/TRAINING PROGRAM

## 2019-08-22 PROCEDURE — 1101F PR PT FALLS ASSESS DOC 0-1 FALLS W/OUT INJ PAST YR: ICD-10-PCS | Mod: HCNC,CPTII,GC,S$GLB | Performed by: STUDENT IN AN ORGANIZED HEALTH CARE EDUCATION/TRAINING PROGRAM

## 2019-08-22 PROCEDURE — 63600175 PHARM REV CODE 636 W HCPCS: Mod: JG,HCNC | Performed by: INTERNAL MEDICINE

## 2019-08-22 PROCEDURE — 3074F PR MOST RECENT SYSTOLIC BLOOD PRESSURE < 130 MM HG: ICD-10-PCS | Mod: HCNC,CPTII,GC,S$GLB | Performed by: STUDENT IN AN ORGANIZED HEALTH CARE EDUCATION/TRAINING PROGRAM

## 2019-08-22 PROCEDURE — 80053 COMPREHEN METABOLIC PANEL: CPT | Mod: HCNC

## 2019-08-22 PROCEDURE — 99215 OFFICE O/P EST HI 40 MIN: CPT | Mod: HCNC,GC,S$GLB, | Performed by: STUDENT IN AN ORGANIZED HEALTH CARE EDUCATION/TRAINING PROGRAM

## 2019-08-22 PROCEDURE — 96401 CHEMO ANTI-NEOPL SQ/IM: CPT | Mod: HCNC

## 2019-08-22 PROCEDURE — 3074F SYST BP LT 130 MM HG: CPT | Mod: HCNC,CPTII,GC,S$GLB | Performed by: STUDENT IN AN ORGANIZED HEALTH CARE EDUCATION/TRAINING PROGRAM

## 2019-08-22 PROCEDURE — 63600175 PHARM REV CODE 636 W HCPCS: Mod: HCNC | Performed by: INTERNAL MEDICINE

## 2019-08-22 PROCEDURE — 99215 PR OFFICE/OUTPT VISIT, EST, LEVL V, 40-54 MIN: ICD-10-PCS | Mod: HCNC,GC,S$GLB, | Performed by: STUDENT IN AN ORGANIZED HEALTH CARE EDUCATION/TRAINING PROGRAM

## 2019-08-22 PROCEDURE — 96365 THER/PROPH/DIAG IV INF INIT: CPT | Mod: HCNC

## 2019-08-22 PROCEDURE — 99999 PR PBB SHADOW E&M-EST. PATIENT-LVL IV: CPT | Mod: PBBFAC,HCNC,GC, | Performed by: STUDENT IN AN ORGANIZED HEALTH CARE EDUCATION/TRAINING PROGRAM

## 2019-08-22 PROCEDURE — 3079F PR MOST RECENT DIASTOLIC BLOOD PRESSURE 80-89 MM HG: ICD-10-PCS | Mod: HCNC,CPTII,GC,S$GLB | Performed by: STUDENT IN AN ORGANIZED HEALTH CARE EDUCATION/TRAINING PROGRAM

## 2019-08-22 PROCEDURE — 99999 PR PBB SHADOW E&M-EST. PATIENT-LVL IV: ICD-10-PCS | Mod: PBBFAC,HCNC,GC, | Performed by: STUDENT IN AN ORGANIZED HEALTH CARE EDUCATION/TRAINING PROGRAM

## 2019-08-22 PROCEDURE — 1101F PT FALLS ASSESS-DOCD LE1/YR: CPT | Mod: HCNC,CPTII,GC,S$GLB | Performed by: STUDENT IN AN ORGANIZED HEALTH CARE EDUCATION/TRAINING PROGRAM

## 2019-08-22 RX ORDER — ONDANSETRON 8 MG/1
8 TABLET, ORALLY DISINTEGRATING ORAL EVERY 8 HOURS PRN
Qty: 60 TABLET | Refills: 1 | Status: ON HOLD | OUTPATIENT
Start: 2019-08-22 | End: 2020-02-10

## 2019-08-22 RX ORDER — BORTEZOMIB 3.5 MG/1
1.3 INJECTION, POWDER, LYOPHILIZED, FOR SOLUTION INTRAVENOUS; SUBCUTANEOUS
Status: COMPLETED | OUTPATIENT
Start: 2019-08-22 | End: 2019-08-22

## 2019-08-22 RX ORDER — ONDANSETRON 4 MG/1
4 TABLET, FILM COATED ORAL EVERY 6 HOURS PRN
Qty: 30 TABLET | Refills: 1 | Status: CANCELLED | OUTPATIENT
Start: 2019-08-22 | End: 2020-08-21

## 2019-08-22 RX ORDER — LENALIDOMIDE 25 MG/1
25 CAPSULE ORAL DAILY
Qty: 21 CAPSULE | Refills: 0 | OUTPATIENT
Start: 2019-08-22

## 2019-08-22 RX ORDER — SODIUM CHLORIDE 0.9 % (FLUSH) 0.9 %
10 SYRINGE (ML) INJECTION
Status: DISCONTINUED | OUTPATIENT
Start: 2019-08-22 | End: 2019-08-22 | Stop reason: HOSPADM

## 2019-08-22 RX ORDER — HEPARIN 100 UNIT/ML
500 SYRINGE INTRAVENOUS
Status: DISCONTINUED | OUTPATIENT
Start: 2019-08-22 | End: 2019-08-22 | Stop reason: HOSPADM

## 2019-08-22 RX ADMIN — BORTEZOMIB 2.4 MG: 3.5 INJECTION, POWDER, LYOPHILIZED, FOR SOLUTION INTRAVENOUS; SUBCUTANEOUS at 12:08

## 2019-08-22 RX ADMIN — SODIUM CHLORIDE 4 MG: 9 INJECTION, SOLUTION INTRAVENOUS at 02:08

## 2019-08-22 NOTE — PROGRESS NOTES
Cytogenetically  high risk MM; mid cycle 4 with excellent response and tolerance  thus far  Borderline sct candidate but has improved significantly with regard to PS since diagnosis   Given renal recovery, Increase revlimid to appropriate dose with next cycle  Continue Monthly zometa, asa, acyc   givne improvement in PS will discuss SCT with BMT group at next wed meeting    FU:  Labs, md appt, velcade, zometa in 4 weeks

## 2019-08-22 NOTE — TELEPHONE ENCOUNTER
----- Message from Abby Stewart RN sent at 8/22/2019  2:39 PM CDT -----  Contact: Shelby  This is under Dr. Odonnell's profile   ----- Message -----  From: Genny Napoles MD  Sent: 8/22/2019   1:38 PM  To: Jere Tineo MD, Keena Maciel, #    If they are still requesting this auth for 10 mg of revilimid, may be we can just change it to 25 mg prior to them actually shipping her out the new medication?     (Informational for Abby) Ms. Thompson is a MM paient who I saw this morning and her kidney function was improving at last check to move to next higher dose of revilimid. I talked to lab to add on a CMP to her labs  to make sure her renal function is continuing to be stable or improve..but she should be able to do higher dose of revilimid now.     ----- Message -----  From: Keena Maciel  Sent: 8/22/2019   9:34 AM  To: Genny Napoles MD    Pharmacy calling to clarify a prescription   Name of caller:: Shelby   [phone]  What do they need to clarify:: Requesting the [employer] auth on Rx Revlimid 10mg

## 2019-08-22 NOTE — PROGRESS NOTES
Cg high risk MM; mid cycle 4 with excellent response  Borderline sct candidate but could be 140mg  Increase rev with next cycle  Monthly zometa, asa, acyc  Weekly zhanna  Labs, md appt, velcade, zometa in 4 weeks

## 2019-08-22 NOTE — Clinical Note
Follow-up patient back in a month for C5D8 with CBC, CMP, Mg, Phos,  free light chains, SPEP, NATALY, quant immunoglobulins checked prior to visit. She will continue to require chair for weekly Velcade on Thursdays, starting on the day of clinic visit. Patient will also need zometa monthly, on [place] day as next visit.

## 2019-08-22 NOTE — PLAN OF CARE
Problem: Adult Inpatient Plan of Care  Goal: Plan of Care Review  Outcome: Ongoing (interventions implemented as appropriate)  1450:  Patient tolerated Zometa infusion well, NAD noted, denies any new issues.  PIV removed intact, AVS given.  Released in stable condition, via wheelchair, accompanied by her friend.

## 2019-08-22 NOTE — PROGRESS NOTES
PATIENT: Shelby Thompson  MRN: [id number]  DATE: 8/25/2019      Diagnosis:   1. Multiple myeloma not having achieved remission        Chief Complaint: Follow-up visit    Oncologic History:   Multiple Myeloma- presented w CRAB criteria (Hgb 7.1, hypercalcemia, renal function - Cr of 2.7, T7 vertebral fracture)  Kappa, RISS Stage III (beta-2 micro globulin of 17.5 and 14:20 translocation) High Risk disease   M-protein was 6.75, urine 600 mg of proteinbiopsy on 5/3 showed 70-80% plasma cells; k/l of 693.7, k of 548  CT on 5/15 results as above with left scapula lesion SUV 3, L5 vertebral body lesion SUV 4.7, stable T7 compression fracture and minimally displaced left lateral 4th rib fracture; no activity in previously noted in splenic or hepatic regions     VRD C1 completed on 5/23 - 6/13;l CrCl of 48 and so revilimid 10 mg; pt on acyclovir and aspirin 81, zometa-given on 5/.10 edentulous  Start C2 on 6/20  Currently on C4D8 of Velcade on 8/22     2 prior CVAs (one in 2008, one in 2011)  L breast cancer DCIS stage 0 (s/p lumpectomy and radiation, no endocrine tx due to CVA)  HTN  DM II  Neuropathy, b/l hands  Prior smoker, quit in 2011  Hepatic lesions - vascular per recent MRI in 05/2019; recs per hepatology conference to scans q3 mo anIntolerance   Statin Intolerance - on praluent, PCSK9 inhibitor   HFpEF - EF 55%, diastolic dysfunction  Anxiety/Depression        Oncologic History:   Pt is a 70 yo AAF with PMHx of COPD, two prior CVAs -one hemorhagic and one ishcemic (2008 and 2011, w residual defecitis and weaknessin R side/tremor on R hand), L Breast cancer s/p lumpectomy and radiation for stage 0 DCIS( in 2011, did not do endocrine tx due to CVA at that time), PVcs, DMII (not on current meds, prior A1c of 7.4), HTN, neuropathy of bilateral hands, liver lesions (which are possibly vascular per recent MRI in 05/2019), and hx of iron defeciency anemia, statin intolerance (on a PCSK9 inihibitor, alirocumab or  praluent), anxiety/derpression, new onset Heart Failure, who presents to the clinic for further management of her recently diagnosed multiple myeloma. The pt states that she had not known about heart failure until this past admission, when she was note to have an EF of 55% and grade II diastolic dysfunction w elevated pulm artery pressure of 41; she was admitted to the hospital on 4/30/19 - 5/8/19 for an exacerbation of her worsening dyspnea over the prior two to three weeks as well as intermittent memory loss. She was noted to have O2 sats of 87% in her PCP, Dr. Arevalo's office and intiially was sent to the hospital in the evaluation for a PE. A CTA chest which was completed did not reveal any thrombus in pulmonary arteries but did reveal b/l ground glass opacities. At the time of her admission, she complained about pain under L breast and also pain in her L flank. PAtient does not recall if she had lost weight over the past few months but does think that she lost some weight in this last admission. Since leaving the [facility], the patient has been more dependent on a rollator to move around. Also, the patient has had worsening nausea at home without any vomiting.      The patient had anemia to Hgb of 7.1, corrected calcium of 10.6, decreased renal function with Cr of 2.7, and a new T7 veterbral fracture. She currently has mid/low back pain and L flank pain.  Kappa was noted to be 548, with a k/l ratio of 693.7. Bone marrow biopsy completed on 5/3 revealed a plasma cell population of 70-80% for this patient. She has a PET-CT scheduled for 5/15. Beta2 microlglobulin was noted to be 17.5 and albumin of 2.5. Also, the patient was found to have an IgG kappa monoclonal protein  THe M-protein was noted to be 6.75 g/dL. Urine protein quantification is still pending.  The pt has had numbness /tingling in her hands post CVA, for which she is on gabapentin 300 mg BID.  The patient also had renal failure initially which dropped  to 1.4 yesterday; she is s/p four days of dexamethasone x 40 mg.         The patient had an EGD in 2016 which had revealed non-bleeding angioectasia and also a colonosopy in March 2015 which showed a 5 mm hyperplastic polyp. In addition, the patient has been on iron supplements, with ferrous gluconate 324 BID. During recent admission in 05/2019, The pt did receive 1 U of pRBC in the hospital. Her ferritin is 54; the pt has not required prior trasnsfusion and no history of IV iron use.  Also, the patient is still on allopurinol 300 mg for her suspected TLS noted in the hospital. Uric acid on her discharge was 8.2. Her Hgb was 7.8 on day of discharge for this paitnet.    IN the past, she had a referral to Hepatology for elevated liver enzymes and found to have liver lesions and had subsequent US guided liver biopsy in 2015, which did not reveal fibrosis and was otherwise normal in apparenace. The patient had negative serolologic testing for Gino's, alpha-1 antitrypsin defeciency, hemochromatosis, autoimmune hepatitis, hemochromatosis, and viral hepatiitis at that time. MRI abdomen/pelvis completed on 5/1/19 - pt was found to have multiple intedeterminate hepatic lesions and two additional hepatic lesions from prior ultrasound were suggestive of vascular malformations (CT had revealed hepatic lesions measuring 1.8 cm in hepatic segment 5 and also 1.3 cm in hepatic segment 8). The pt had a splenic lesion, which was noted to be about 2 cm on CT abdomen/pelvis on 4/10/19. Also, the patient had a 1 cm soft tissue lesion anterior to aorta and inferior to 3rd portion of duodenum. She had  thickened endometrium noted on pelvic ultrasound, which was not thought to require any further evaluation given no sx at time, when pt was evaluated by OB/GYN Dr. Teran.      For the heart failure, the ptis on lasix 20 mg daily for diuresis. A congo red stain was completed on BM biopsy and was negative.   She had COPD and quit smokling  in 2011, after smoking about 1/2 pack a day for about 42 years. She does not have any regular use of inhalers and does not keep any at home. She is currently on baby aspirin at home. Pt is edentulous.    The patient is seen with her  and also her daughter, Jessica. Also, the patient has one sister with breast cancer and one sister with lung cancer; there is no hematologic malignancy in the family. She is a retired .       Subjective:   Last zometa on 7/25/19. C4D8 of Velcade on 8/22. The patient is doing well, nausea controlled on one zofran a day. Her increased stool frequency (not diarrhea) has essentially gone away.    Her weight has been stable. Leg swelling is much better on dex 20 mg. Pt is ambulating with a rollator mostly at home.  Neuropathy in hands is stable. She continues to be on Revilimid 10 mg.   Overall, she continues to do well currently without any complaints. Discussed process of transplant and further eval with her in clinic today; she mentioned that she would probably like to meet with a coordinator next week.     Past Medical History:   Past Medical History:   Diagnosis Date    Acute respiratory failure with hypoxia 4/30/2019    FUENTES (acute kidney injury) 5/1/2019    Allergy     Anemia     Aortic aneurysm     Breast cancer 10/2011    left breast Stage 0 DCIS    Chronic diastolic congestive heart failure 11/6/2015    Colon polyp     GERD (gastroesophageal reflux disease)     History of colonic polyps     HX: breast cancer     Hyperlipemia     Hypertension     ICH (intracerebral hemorrhage)     Major depressive disorder, single episode, mild 6/23/2016    Nuclear sclerosis 7/21/2014    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 7/21/2014    PAD (peripheral artery disease) 11/6/2015    Statin-induced rhabdomyolysis     4/2015     Stroke 4/2011       Past Surgical HIstory:   Past Surgical History:   Procedure Laterality Date    APPENDECTOMY       BIOPSY LIVER AND ULTRASOUND N/A 4/6/2015    Performed by [facility] at [facility] OR 2ND FLR    BREAST BIOPSY Left 10/2011    BREAST LUMPECTOMY Left 2011    DCIS    COLONOSCOPY      COLONOSCOPY N/A 3/26/2015    Performed by Wicho Woodard MD at [facility] ENDO (4TH FLR)    CYST REMOVAL      back    ESOPHAGOGASTRODUODENOSCOPY  07/2016    duodenal angioectasia    ESOPHAGOGASTRODUODENOSCOPY (EGD) N/A 7/27/2016    Performed by Malik Sanchez MD at [facility] ENDO (4TH FLR)    FOOT FRACTURE SURGERY  10/2012    right foot, with R hallux valgus repair    FRACTURE SURGERY Right     broken toe repiar    HEMORRHOID SURGERY      LIVER BIOPSY  04/2015    essentially normal, no fibrosis    TUBAL LIGATION      UPPER GASTROINTESTINAL ENDOSCOPY         Family History:   Family History   Problem Relation Age of Onset    No Known Problems Sister     Cancer Sister 63        Lung Cancer    No Known Problems Mother     Cancer Father     No Known Problems Brother     Hypertension Daughter     Fibroids Daughter         uterine    Hypertension Son     Breast cancer Sister 62    No Known Problems Brother     No Known Problems Maternal Aunt     No Known Problems Maternal Uncle     No Known Problems Paternal Aunt     No Known Problems Paternal Uncle     Hypertension Maternal Grandmother     No Known Problems Maternal Grandfather     No Known Problems Paternal Grandmother     No Known Problems Paternal Grandfather     Ovarian cancer Neg Hx     Colon cancer Neg Hx     Tremor Neg Hx     Amblyopia Neg Hx     Blindness Neg Hx     Cataracts Neg Hx     Diabetes Neg Hx     Glaucoma Neg Hx     Macular degeneration Neg Hx     Retinal detachment Neg Hx     Strabismus Neg Hx     Stroke Neg Hx     Thyroid disease Neg Hx     Esophageal cancer Neg Hx     Rectal cancer Neg Hx     Stomach cancer Neg Hx     Ulcerative colitis Neg Hx     Crohn's disease Neg Hx     Irritable bowel syndrome Neg Hx     Celiac  disease Neg Hx        Social History:  reports that she quit smoking about 8 years ago. Her smoking use included cigarettes. She has a 10.00 pack-year smoking history. She quit smokeless tobacco use about 8 years ago. She reports that she drinks alcohol. She reports that she does not use drugs.    Allergies:  Review of patient's allergies indicates:   Allergen Reactions    Lipitor [atorvastatin]      Itching, elevated cpk, muscle aches, statin induced myositis       Medications:  Current Outpatient Medications   Medication Sig Dispense Refill    alirocumab (PRALUENT PEN) 75 mg/mL PnIj Inject 1 mL (75 mg total) into the skin every 14 (fourteen) days. Ask your PCP/ Oncologist 2 mL 11    allopurinol (ZYLOPRIM) 300 MG tablet Take 1 tablet (300 mg total) by mouth once daily. 30 tablet 1    amLODIPine (NORVASC) 5 MG tablet TAKE 1 TABLET BY MOUTH EVERY DAY 90 tablet 3    aspirin 81 mg Tab Take 1 tablet by mouth Daily.      bisacodyl (DULCOLAX) 5 mg EC tablet Take 5 mg by mouth once daily.      dexAMETHasone (DECADRON) 4 MG Tab Take 5 tablets (20 mg total) by mouth once a week. Take 5 tabs or 20 mg weekly, all tabs on same day as weekly Velcade injection 60 tablet 0    ferrous gluconate 324 mg (37.5 mg iron) Tab TAKE 1 TABLET BY MOUTH 2 (TWO) TIMES DAILY WITH MEALS. 180 tablet 0    gabapentin (NEURONTIN) 300 MG capsule TAKE 1 CAPSULE (300 MG TOTAL) BY MOUTH 2 (TWO) TIMES DAILY. 180 capsule 3    omeprazole (PRILOSEC) 40 MG capsule TAKE 1 CAPSULE (40 MG TOTAL) BY MOUTH ONCE DAILY. 90 capsule 0    ondansetron (ZOFRAN) 4 MG tablet Take 1 tablet (4 mg total) by mouth every 6 (six) hours as needed for Nausea. 30 tablet 1    traMADol (ULTRAM) 50 mg tablet Take 1 tablet (50 mg total) by mouth 2 (two) times daily as needed for Pain. 60 tablet 3    vitamin D 1000 units Tab Take 185 mg by mouth once daily.      acetaminophen (TYLENOL) 650 MG TbSR Take 1,300 mg by mouth daily as needed (pain).      acyclovir (ZOVIRAX)  "400 MG tablet Take 1 tablet (400 mg total) by mouth 2 (two) times daily. 120 tablet 2    ondansetron (ZOFRAN-ODT) 8 MG TbDL Take 1 tablet (8 mg total) by mouth every 8 (eight) hours as needed (nausea/vomiting). 60 tablet 1    sertraline (ZOLOFT) 50 MG tablet TAKE 1 TABLET (50 MG TOTAL) BY MOUTH ONCE DAILY. 90 tablet 3     Current Facility-Administered Medications   Medication Dose Route Frequency Provider Last Rate Last Dose    zoledronic acid (ZOMETA) 4 mg in sodium chloride 0.9% 100 mL IVPB  4 mg Intravenous 1 time in Clinic/HOD Genny Napoles MD           Review of Systems   Respiratory: Negative for cough.    Cardiovascular: Negative for chest pain and palpitations.   Gastrointestinal: Positive for nausea. Negative for abdominal pain.   Genitourinary: Negative for difficulty urinating and dysuria.   Musculoskeletal: Positive for back pain and gait problem.   Skin: Negative for rash.   Neurological: Positive for tremors, weakness and numbness. Negative for headaches.   Hematological: Negative for adenopathy.       ECOG Performance Status: 1   Objective:      Vitals:   Vitals:    08/22/19 1010 08/22/19 1017   BP: (!) 143/90 (!) 140/88   Pulse: 79    Resp: 16    Temp: 98.2 °F (36.8 °C)    TempSrc: Oral    SpO2: 96%    Weight: 75.9 kg (167 lb 5.3 oz)    Height: 5' 2" (1.575 m)      BMI: Body mass index is 30.6 kg/m².    Physical Exam   Constitutional: She is oriented to person, place, and time. She appears well-developed and well-nourished. No distress.   HENT:   Head: Normocephalic and atraumatic.   Mouth/Throat: Oropharynx is clear and moist.   Eyes: Pupils are equal, round, and reactive to light. Conjunctivae and EOM are normal.   Neck: Normal range of motion. Neck supple.   Cardiovascular: Normal rate, regular rhythm and normal heart sounds. Exam reveals no gallop and no friction rub.   No murmur heard.  Pulmonary/Chest: Effort normal and breath sounds normal. No respiratory distress. She has no wheezes. " She has no rales.   Abdominal: Soft. Bowel sounds are normal. She exhibits no distension. There is no tenderness. There is no guarding.   Musculoskeletal: Normal range of motion. She exhibits no edema.   Lymphadenopathy:     She has no cervical adenopathy.   Neurological: She is alert and oriented to person, place, and time. No cranial nerve deficit.   Intentional R hand tremor; RUE - 4/5 strength and RLE extremity also 4/5; full strengths on LUE and LLE   Skin: Skin is warm and dry. No rash noted.       Laboratory Data:  Infusion on 08/22/2019   Component Date Value Ref Range Status    Sodium 08/22/2019 143  136 - 145 mmol/L Final    Potassium 08/22/2019 3.5  3.5 - 5.1 mmol/L Final    Chloride 08/22/2019 107  95 - 110 mmol/L Final    CO2 08/22/2019 25  23 - 29 mmol/L Final    Glucose 08/22/2019 136* 70 - 110 mg/dL Final    BUN, Bld 08/22/2019 10  8 - 23 mg/dL Final    Creatinine 08/22/2019 0.9  0.5 - 1.4 mg/dL Final    Calcium 08/22/2019 9.2  8.7 - 10.5 mg/dL Final    Total Protein 08/22/2019 7.3  6.0 - 8.4 g/dL Final    Albumin 08/22/2019 3.9  3.5 - 5.2 g/dL Final    Total Bilirubin 08/22/2019 0.4  0.1 - 1.0 mg/dL Final    Comment: For infants and newborns, interpretation of results should be based  on gestational age, weight and in agreement with clinical  observations.  Premature Infant recommended reference ranges:  Up to 24 hours.............<8.0 mg/dL  Up to 48 hours............<12.0 mg/dL  3-5 days..................<15.0 mg/dL  6-29 days.................<15.0 mg/dL      Alkaline Phosphatase 08/22/2019 84  55 - 135 U/L Final    AST 08/22/2019 15  10 - 40 U/L Final    ALT 08/22/2019 13  10 - 44 U/L Final    Anion Gap 08/22/2019 11  8 - 16 mmol/L Final    eGFR if African American 08/22/2019 >60.0  >60 mL/min/1.73 m^2 Final    eGFR if non African American 08/22/2019 >60.0  >60 mL/min/1.73 m^2 Final    Comment: Calculation used to obtain the estimated glomerular filtration  rate (eGFR) is the  CKD-EPI equation.      Lab Visit on 08/22/2019   Component Date Value Ref Range Status    WBC 08/22/2019 3.39* 3.90 - 12.70 K/uL Final    RBC 08/22/2019 4.71  4.00 - 5.40 M/uL Final    Hemoglobin 08/22/2019 10.4* 12.0 - 16.0 g/dL Final    Hematocrit 08/22/2019 36.7* 37.0 - 48.5 % Final    Mean Corpuscular Volume 08/22/2019 78* 82 - 98 fL Final    Mean Corpuscular Hemoglobin 08/22/2019 22.1* 27.0 - 31.0 pg Final    Mean Corpuscular Hemoglobin Conc 08/22/2019 28.3* 32.0 - 36.0 g/dL Final    RDW 08/22/2019 20.3* 11.5 - 14.5 % Final    Platelets 08/22/2019 211  150 - 350 K/uL Final    MPV 08/22/2019 SEE COMMENT  9.2 - 12.9 fL Final    Result not available.    Immature Granulocytes 08/22/2019 0.3  0.0 - 0.5 % Final    Gran # (ANC) 08/22/2019 2.0  1.8 - 7.7 K/uL Final    Immature Grans (Abs) 08/22/2019 0.01  0.00 - 0.04 K/uL Final    Comment: Mild elevation in immature granulocytes is non specific and   can be seen in a variety of conditions including stress response,   acute inflammation, trauma and pregnancy. Correlation with other   laboratory and clinical findings is essential.      Lymph # 08/22/2019 0.6* 1.0 - 4.8 K/uL Final    Mono # 08/22/2019 0.4  0.3 - 1.0 K/uL Final    Eos # 08/22/2019 0.3  0.0 - 0.5 K/uL Final    Baso # 08/22/2019 0.05  0.00 - 0.20 K/uL Final    nRBC 08/22/2019 0  0 /100 WBC Final    Gran% 08/22/2019 59.6  38.0 - 73.0 % Final    Lymph% 08/22/2019 16.2* 18.0 - 48.0 % Final    Mono% 08/22/2019 13.0  4.0 - 15.0 % Final    Eosinophil% 08/22/2019 9.4* 0.0 - 8.0 % Final    Basophil% 08/22/2019 1.5  0.0 - 1.9 % Final    Differential Method 08/22/2019 Automated   Final    Magnesium 08/22/2019 1.9  1.6 - 2.6 mg/dL Final    Phosphorus 08/22/2019 3.1  2.7 - 4.5 mg/dL Final    Uric Acid 08/22/2019 5.2  2.4 - 5.7 mg/dL Final    Kappa Free Light Chains 08/22/2019 2.26* 0.33 - 1.94 mg/dL Final    Lambda Free Light Chains 08/22/2019 1.45  0.57 - 2.63 mg/dL Final    Kappa/Lambda  FLC Ratio 08/22/2019 1.56  0.26 - 1.65 Final    Protein, Serum 08/22/2019 6.9  6.0 - 8.4 g/dL Final    Comment: Serum protein electrophoresis and immunofixation results should be   interpreted in clinical context in that some therapeutic agents can   result   in false positive results (example, daratumumab). Correlation with   the   patient s therapeutic regimen is required.      Albumin grams/dl 08/22/2019 3.85  3.35 - 5.55 g/dL Final    Alpha-1 grams/dl 08/22/2019 0.34  0.17 - 0.41 g/dL Final    Alpha-2 grams/dl 08/22/2019 0.84  0.43 - 0.99 g/dL Final    Beta grams/dl 08/22/2019 0.85  0.50 - 1.10 g/dL Final    Gamma grams/dl 08/22/2019 1.02  0.67 - 1.58 g/dL Final    Immunofix Interp. 08/22/2019 SEE COMMENT   Final    Comment: Serum protein electrophoresis and immunofixation results should be   interpreted in clinical context in that some therapeutic agents can   result   in false positive results (example, daratumumab). Correlation with   the   patient s therapeutic regimen is required.  See pathologist's interpretation.      IgG - Serum 08/22/2019 1073  650 - 1600 mg/dL Final    IgG Cord Blood Reference Range: 650-1600 mg/dL.    IgA 08/22/2019 65  40 - 350 mg/dL Final    IgA Cord Blood Reference Range: <5 mg/dL.    IgM 08/22/2019 28* 50 - 300 mg/dL Final    IgM Cord Blood Reference Range: <25 mg/dL.    Pathologist Interpretation SPE 08/22/2019 REVIEWED   Final    Comment: Electronically reviewed and signed by:  Destiny Lopes M.D.  Signed on 08/23/19 at 17:02  Normal total protein.   Normal gamma globulins are decreased. There is a paraprotein band in   gamma measuring 0.33 g/dL (0.55 g/dL previously)       Pathologist Interpretation NATALY 08/22/2019 REVIEWED   Final    Comment: Electronically reviewed and signed by:  Destiny Lopes M.D.  Signed on 08/23/19 at 17:03  An IgG kappa specific monoclonal protein is present.            Imaging:   Assessment:       1. Multiple myeloma not having  achieved remission           Plan:       Multiple Myeloma  - ECOG PS 1  - pt met most of Crab criteria in hospital - had anemia (hgb 7.1) corrected calcium of 10.6, renal function with Cr of 2.7 and T7 verterbral fracture  - Kappa was noted to be 548, with a k/l ratio of 693.7.  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  At  diagnosis, this translocation has an unfavorable prognosis in multiple myeloma (Correa et al., Blood [id number], 2003).  - 14:20 translocation will place this as R-ISS Stage III, high risk disease  - Bone marrow biopsy completed on 5/3 revealed a plasma cell population of 70-80% for this patient  -  PET-CT on 5/15 results as above with left scapula lesion SUV 3, L5 vertebral body lesion SUV 4.7, stable T7 compression fracture and minimally displaced left lateral 4th rib fracture; no activity in previously noted in splenic or hepatic regions   -SPEP reveals M-protein was noted to be 6.75 g/dL, NATALY reveals IgG kappa monoclonal protein    - Urine protein 24h quantification, 663 mg   - s/p four doses of dexamethasone 40 mg, w last dose on 5/9  - Plan for velcade 1.3 mg/m2 weekly on days 1,8.15.22 for 28 day cycles, also will plan to dose dexamethasone 40 mg weekly as well on days of velcade shots; currently revilimid at 10 mg/day  - Can dose revilmid to 25 mg a day; decrease dexamethasone to 20 mg a day  - went over risks of neuropathy, blood clots, VZV reactivation, and GI symptoms such as nausea, diarrhea and pt also asked to call us and/or come to ER for new fevers greater than 100.4F or new onset brusing bleeding. Pt agrees to proceed with therapy and has had  sign consent form due to limited dexterity with R hand  -  continue aspirin 81 mg daily; continue acyclovir 400 BID, discussed risks of shingles if this medication is not included  in regimen  - Hgb of 10.4 today   - plan for zometa monthly, no need for dental eval as she has no current known jaw breakdown and is edentulous; can do 4 mg monthly  - C1 of VRD 5/23 - 6/19  - C2 of VRD - starting now on 6/20   Currently on C4D8 of Velcade on 8/22  - current IgG of 1073, kappa of 2.26 and lambda of 1.45; ratio is 1.56     Lower extremity edema, worse on L leg  - DVT has been ruled out, better during this visit  -continue dexmethasone to 20 mg  - continue compression stokcings     Nausea/ vomiting  - controlled on zofran at first sign of nausea  - will add secondary medications for additional control too if this becomes worse or uncontrolled      Malnutrition/loss of appetite  - discussed with SW about arranging pt with boost/ensure      Anemia  - low ferritin in years past 4-8, currently is 54 on 4/30/19 while on iron supplementation  - prior GI eval in 2015 and 16  - will continue to monitor for worsening anemia and signs sx of bleeding     FUENTES  - 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 Tineo     F/u:   Follow-up patient back in a month with CBC, CMP, Mg, Phos,  Uric acid, free light chains, SPEP, NATALY, quant immunoglobulins checked prior to visit. She will continue to require chair for weekly Velcade, starting on the day of clinic visit. Continue zometa monthly    Genny Napoles MD   Hematology and Oncology Fellow, PGY [facility]      Agree with Dr. Napoles's history, physical, assessment, and plan with the following addendums.    Cytogenetically  high risk MM; mid cycle 4 with excellent response and tolerance  thus far  Borderline sct candidate but has improved significantly with regard to PS since  diagnosis   Given renal recovery, Increase revlimid to appropriate dose with next cycle  Continue Monthly zometa, asa, acyc   given improvement in PS will discuss SCT with BMT group at next wed meeting    FU:  Labs, md appt, velcade, zometa in 4 weeks

## 2019-08-26 ENCOUNTER — PATIENT MESSAGE (OUTPATIENT)
Dept: HEMATOLOGY/ONCOLOGY | Facility: CLINIC | Age: 69
End: 2019-08-26

## 2019-08-26 RX ORDER — LENALIDOMIDE 25 MG/1
25 CAPSULE ORAL DAILY
Qty: 21 CAPSULE | Refills: 0 | Status: SHIPPED | OUTPATIENT
Start: 2019-08-26 | End: 2019-09-13 | Stop reason: SDUPTHER

## 2019-08-28 ENCOUNTER — TELEPHONE (OUTPATIENT)
Dept: HEMATOLOGY/ONCOLOGY | Facility: CLINIC | Age: 69
End: 2019-08-28

## 2019-08-28 NOTE — TELEPHONE ENCOUNTER
Pt questioned not having velcade injections for the next 3 weeks. Consulted Dr. Tineo- reviewed treatment plan and follow up notes. Advised to make weekly appointments. Made velcade injection appointments until 9/19.

## 2019-08-28 NOTE — TELEPHONE ENCOUNTER
----- Message from Becky Maldonado sent at 8/28/2019  9:19 AM CDT -----  Contact: Pt       ----- Message -----  From: Gilma Gan  Sent: 8/28/2019   9:13 AM  To: [facility] Staff    Pt called to speak with nurse have some questions about chemo appt   Callback#[phone]  Thank You  VEDA Gan

## 2019-08-29 ENCOUNTER — INFUSION (OUTPATIENT)
Dept: INFUSION THERAPY | Facility: HOSPITAL | Age: 69
End: 2019-08-29
Attending: STUDENT IN AN ORGANIZED HEALTH CARE EDUCATION/TRAINING PROGRAM
Payer: MEDICARE

## 2019-08-29 VITALS
DIASTOLIC BLOOD PRESSURE: 78 MMHG | HEART RATE: 80 BPM | SYSTOLIC BLOOD PRESSURE: 130 MMHG | TEMPERATURE: 98 F | RESPIRATION RATE: 18 BRPM

## 2019-08-29 DIAGNOSIS — C90.00 MULTIPLE MYELOMA NOT HAVING ACHIEVED REMISSION: Primary | ICD-10-CM

## 2019-08-29 PROCEDURE — 96401 CHEMO ANTI-NEOPL SQ/IM: CPT | Mod: HCNC

## 2019-08-29 PROCEDURE — 63600175 PHARM REV CODE 636 W HCPCS: Mod: JG,HCNC | Performed by: INTERNAL MEDICINE

## 2019-08-29 RX ORDER — BORTEZOMIB 3.5 MG/1
1.3 INJECTION, POWDER, LYOPHILIZED, FOR SOLUTION INTRAVENOUS; SUBCUTANEOUS
Status: COMPLETED | OUTPATIENT
Start: 2019-08-29 | End: 2019-08-29

## 2019-08-29 RX ADMIN — BORTEZOMIB 2.4 MG: 3.5 INJECTION, POWDER, LYOPHILIZED, FOR SOLUTION INTRAVENOUS; SUBCUTANEOUS at 10:08

## 2019-09-05 ENCOUNTER — INFUSION (OUTPATIENT)
Dept: INFUSION THERAPY | Facility: HOSPITAL | Age: 69
End: 2019-09-05
Attending: STUDENT IN AN ORGANIZED HEALTH CARE EDUCATION/TRAINING PROGRAM
Payer: MEDICARE

## 2019-09-05 VITALS — RESPIRATION RATE: 18 BRPM | HEART RATE: 79 BPM | DIASTOLIC BLOOD PRESSURE: 87 MMHG | SYSTOLIC BLOOD PRESSURE: 138 MMHG

## 2019-09-05 DIAGNOSIS — C90.00 MULTIPLE MYELOMA NOT HAVING ACHIEVED REMISSION: Primary | ICD-10-CM

## 2019-09-05 PROCEDURE — 96401 CHEMO ANTI-NEOPL SQ/IM: CPT | Mod: HCNC

## 2019-09-05 PROCEDURE — 63600175 PHARM REV CODE 636 W HCPCS: Mod: JG,HCNC | Performed by: INTERNAL MEDICINE

## 2019-09-05 RX ORDER — HEPARIN 100 UNIT/ML
500 SYRINGE INTRAVENOUS
Status: DISCONTINUED | OUTPATIENT
Start: 2019-09-05 | End: 2019-09-05 | Stop reason: HOSPADM

## 2019-09-05 RX ORDER — BORTEZOMIB 3.5 MG/1
1.3 INJECTION, POWDER, LYOPHILIZED, FOR SOLUTION INTRAVENOUS; SUBCUTANEOUS
Status: CANCELLED | OUTPATIENT
Start: 2019-10-24

## 2019-09-05 RX ORDER — SODIUM CHLORIDE 0.9 % (FLUSH) 0.9 %
10 SYRINGE (ML) INJECTION
Status: CANCELLED | OUTPATIENT
Start: 2019-09-19

## 2019-09-05 RX ORDER — BORTEZOMIB 3.5 MG/1
1.3 INJECTION, POWDER, LYOPHILIZED, FOR SOLUTION INTRAVENOUS; SUBCUTANEOUS
Status: COMPLETED | OUTPATIENT
Start: 2019-09-05 | End: 2019-09-05

## 2019-09-05 RX ORDER — HEPARIN 100 UNIT/ML
500 SYRINGE INTRAVENOUS
Status: CANCELLED | OUTPATIENT
Start: 2019-09-12

## 2019-09-05 RX ORDER — SODIUM CHLORIDE 0.9 % (FLUSH) 0.9 %
10 SYRINGE (ML) INJECTION
Status: CANCELLED | OUTPATIENT
Start: 2019-09-12

## 2019-09-05 RX ORDER — SODIUM CHLORIDE 0.9 % (FLUSH) 0.9 %
10 SYRINGE (ML) INJECTION
Status: DISCONTINUED | OUTPATIENT
Start: 2019-09-05 | End: 2019-09-05 | Stop reason: HOSPADM

## 2019-09-05 RX ORDER — BORTEZOMIB 3.5 MG/1
1.3 INJECTION, POWDER, LYOPHILIZED, FOR SOLUTION INTRAVENOUS; SUBCUTANEOUS
Status: CANCELLED | OUTPATIENT
Start: 2019-09-19

## 2019-09-05 RX ORDER — BORTEZOMIB 3.5 MG/1
1.3 INJECTION, POWDER, LYOPHILIZED, FOR SOLUTION INTRAVENOUS; SUBCUTANEOUS
Status: CANCELLED | OUTPATIENT
Start: 2019-09-12

## 2019-09-05 RX ORDER — HEPARIN 100 UNIT/ML
500 SYRINGE INTRAVENOUS
Status: CANCELLED | OUTPATIENT
Start: 2019-09-19

## 2019-09-05 RX ORDER — HEPARIN 100 UNIT/ML
500 SYRINGE INTRAVENOUS
Status: CANCELLED | OUTPATIENT
Start: 2019-10-24

## 2019-09-05 RX ORDER — SODIUM CHLORIDE 0.9 % (FLUSH) 0.9 %
10 SYRINGE (ML) INJECTION
Status: CANCELLED | OUTPATIENT
Start: 2019-10-24

## 2019-09-05 RX ADMIN — BORTEZOMIB 2.4 MG: 3.5 INJECTION, POWDER, LYOPHILIZED, FOR SOLUTION INTRAVENOUS; SUBCUTANEOUS at 11:09

## 2019-09-06 DIAGNOSIS — R16.0 LIVER MASSES: ICD-10-CM

## 2019-09-12 ENCOUNTER — INFUSION (OUTPATIENT)
Dept: INFUSION THERAPY | Facility: HOSPITAL | Age: 69
End: 2019-09-12
Attending: STUDENT IN AN ORGANIZED HEALTH CARE EDUCATION/TRAINING PROGRAM
Payer: MEDICARE

## 2019-09-12 DIAGNOSIS — C90.00 MULTIPLE MYELOMA NOT HAVING ACHIEVED REMISSION: Primary | ICD-10-CM

## 2019-09-12 PROCEDURE — 96401 CHEMO ANTI-NEOPL SQ/IM: CPT | Mod: HCNC

## 2019-09-12 PROCEDURE — 63600175 PHARM REV CODE 636 W HCPCS: Mod: JG,HCNC | Performed by: INTERNAL MEDICINE

## 2019-09-12 RX ORDER — BORTEZOMIB 3.5 MG/1
1.3 INJECTION, POWDER, LYOPHILIZED, FOR SOLUTION INTRAVENOUS; SUBCUTANEOUS
Status: COMPLETED | OUTPATIENT
Start: 2019-09-12 | End: 2019-09-12

## 2019-09-12 RX ADMIN — BORTEZOMIB 2.4 MG: 3.5 INJECTION, POWDER, LYOPHILIZED, FOR SOLUTION INTRAVENOUS; SUBCUTANEOUS at 11:09

## 2019-09-13 DIAGNOSIS — C90.00 MULTIPLE MYELOMA, REMISSION STATUS UNSPECIFIED: ICD-10-CM

## 2019-09-13 DIAGNOSIS — E11.9 TYPE 2 DIABETES MELLITUS WITHOUT COMPLICATION: ICD-10-CM

## 2019-09-16 ENCOUNTER — HOSPITAL ENCOUNTER (OUTPATIENT)
Dept: RADIOLOGY | Facility: HOSPITAL | Age: 69
Discharge: HOME | End: 2019-09-16
Attending: NURSE PRACTITIONER
Payer: MEDICARE

## 2019-09-16 DIAGNOSIS — R16.0 LIVER MASSES: ICD-10-CM

## 2019-09-16 PROCEDURE — 74183 MRI ABD W/O CNTR FLWD CNTR: CPT | Mod: 26,HCNC,, | Performed by: RADIOLOGY

## 2019-09-16 PROCEDURE — 25500020 PHARM REV CODE 255: Mod: HCNC | Performed by: NURSE PRACTITIONER

## 2019-09-16 PROCEDURE — A9585 GADOBUTROL INJECTION: HCPCS | Mod: HCNC | Performed by: NURSE PRACTITIONER

## 2019-09-16 PROCEDURE — 74183 MRI ABD W/O CNTR FLWD CNTR: CPT | Mod: TC,HCNC

## 2019-09-16 PROCEDURE — 74183 MRI ABDOMEN W WO CONTRAST: ICD-10-PCS | Mod: 26,HCNC,, | Performed by: RADIOLOGY

## 2019-09-16 RX ORDER — LENALIDOMIDE 25 MG/1
CAPSULE ORAL
Qty: 21 CAPSULE | Refills: 0 | Status: SHIPPED | OUTPATIENT
Start: 2019-09-16 | End: 2019-09-20 | Stop reason: SDUPTHER

## 2019-09-16 RX ORDER — GADOBUTROL 604.72 MG/ML
10 INJECTION INTRAVENOUS
Status: COMPLETED | OUTPATIENT
Start: 2019-09-16 | End: 2019-09-16

## 2019-09-16 RX ADMIN — GADOBUTROL 10 ML: 604.72 INJECTION INTRAVENOUS at 09:09

## 2019-09-18 ENCOUNTER — HOSPITAL ENCOUNTER (OUTPATIENT)
Dept: RADIOLOGY | Facility: HOSPITAL | Age: 69
Discharge: HOME | End: 2019-09-18
Attending: INTERNAL MEDICINE
Payer: MEDICARE

## 2019-09-18 ENCOUNTER — TELEPHONE (OUTPATIENT)
Dept: HEPATOLOGY | Facility: CLINIC | Age: 69
End: 2019-09-18

## 2019-09-18 DIAGNOSIS — Z12.31 ENCOUNTER FOR SCREENING MAMMOGRAM FOR BREAST CANCER: ICD-10-CM

## 2019-09-18 PROCEDURE — 77067 MAMMO DIGITAL SCREENING BILAT WITH TOMOSYNTHESIS_CAD: ICD-10-PCS | Mod: 26,HCNC,, | Performed by: RADIOLOGY

## 2019-09-18 PROCEDURE — 77063 MAMMO DIGITAL SCREENING BILAT WITH TOMOSYNTHESIS_CAD: ICD-10-PCS | Mod: 26,HCNC,, | Performed by: RADIOLOGY

## 2019-09-18 PROCEDURE — 77063 BREAST TOMOSYNTHESIS BI: CPT | Mod: 26,HCNC,, | Performed by: RADIOLOGY

## 2019-09-18 PROCEDURE — 77067 SCR MAMMO BI INCL CAD: CPT | Mod: 26,HCNC,, | Performed by: RADIOLOGY

## 2019-09-18 PROCEDURE — 77067 SCR MAMMO BI INCL CAD: CPT | Mod: TC,HCNC

## 2019-09-18 NOTE — TELEPHONE ENCOUNTER
Patient: Shelby Thompson       MRN: [id number]      : 1950     Age: 69 y.o.  [address] [facility] [address]    Provider: Hepatologist - Chris (previously followed by Digna Hanson NP)    Urgency of review: non-urgent    Patient Transplant Status: Hepatology / Non-transplant    Reason for presentation: Reassessment    Clinical Summary: 69 year old lady with no hx of liver disease, recently diagnosed with metastatic multiple myeloma, was being treated with lenlidomide and bortezomib. She was previously found to have multiple liver lesions on u/s, TPCT, and MRI. She also had an enhancing retroperitoneal soft tissue lesion, likely an enlarged retroperitoneal lymph node, concerning for metastasis per TPCT. Her PET scan on 5/15/19 did not show any activity in the liver.     AFP, CEA, CA 19-9, and  all normal.    Reviewed at IR conference  with recommendation for 3 month imaging surveillance. MRI done 19 with likely portosystemic shunts but no liver masses noted.    Imaging to be reviewed: MRI 19    HCC Treatment History: NA     ABO: O POS    Platelets:   Lab Results   Component Value Date/Time     2019 09:31 AM     Creatinine:   Lab Results   Component Value Date/Time    CREATININE 0.9 2019 12:18 PM     Bilirubin:   Lab Results   Component Value Date/Time    BILITOT 0.4 2019 12:18 PM     AFP Last 3 each if available:   Lab Results   Component Value Date/Time    AFP 1.5 2019 03:42 AM    AFP 2.0 2015 09:15 AM       MELD: MELD-Na score: 19 at 2019  5:42 AM  MELD score: 18 at 2019  5:42 AM  Calculated from:  Serum Creatinine: 2.7 mg/dL at 2019  5:42 AM  Serum Sodium: 135 mmol/L at 2019  5:42 AM  Total Bilirubin: 0.3 mg/dL (Rounded to 1 mg/dL) at 2019  5:42 AM  INR(ratio): 1.2 at 2019 10:25 AM  Age: 69 years    Plan:     Follow-up Provider:

## 2019-09-19 ENCOUNTER — EDUCATION (OUTPATIENT)
Dept: HEMATOLOGY/ONCOLOGY | Facility: CLINIC | Age: 69
End: 2019-09-19

## 2019-09-19 ENCOUNTER — INFUSION (OUTPATIENT)
Dept: INFUSION THERAPY | Facility: HOSPITAL | Age: 69
End: 2019-09-19
Attending: STUDENT IN AN ORGANIZED HEALTH CARE EDUCATION/TRAINING PROGRAM
Payer: MEDICARE

## 2019-09-19 ENCOUNTER — TELEPHONE (OUTPATIENT)
Dept: HEMATOLOGY/ONCOLOGY | Facility: CLINIC | Age: 69
End: 2019-09-19

## 2019-09-19 ENCOUNTER — OFFICE VISIT (OUTPATIENT)
Dept: HEMATOLOGY/ONCOLOGY | Facility: CLINIC | Age: 69
End: 2019-09-19
Payer: MEDICARE

## 2019-09-19 VITALS
HEART RATE: 57 BPM | HEIGHT: 63 IN | OXYGEN SATURATION: 99 % | WEIGHT: 168.19 LBS | RESPIRATION RATE: 16 BRPM | TEMPERATURE: 98 F | BODY MASS INDEX: 29.8 KG/M2

## 2019-09-19 VITALS
RESPIRATION RATE: 18 BRPM | DIASTOLIC BLOOD PRESSURE: 75 MMHG | OXYGEN SATURATION: 97 % | HEART RATE: 66 BPM | TEMPERATURE: 99 F | SYSTOLIC BLOOD PRESSURE: 136 MMHG

## 2019-09-19 DIAGNOSIS — C90.00 MULTIPLE MYELOMA NOT HAVING ACHIEVED REMISSION: Primary | ICD-10-CM

## 2019-09-19 DIAGNOSIS — C90.00 METASTATIC MULTIPLE MYELOMA TO BONE: ICD-10-CM

## 2019-09-19 DIAGNOSIS — C90.00 MULTIPLE MYELOMA, REMISSION STATUS UNSPECIFIED: ICD-10-CM

## 2019-09-19 PROCEDURE — 3078F PR MOST RECENT DIASTOLIC BLOOD PRESSURE < 80 MM HG: ICD-10-PCS | Mod: HCNC,CPTII,GC,S$GLB | Performed by: STUDENT IN AN ORGANIZED HEALTH CARE EDUCATION/TRAINING PROGRAM

## 2019-09-19 PROCEDURE — 3074F PR MOST RECENT SYSTOLIC BLOOD PRESSURE < 130 MM HG: ICD-10-PCS | Mod: HCNC,CPTII,GC,S$GLB | Performed by: STUDENT IN AN ORGANIZED HEALTH CARE EDUCATION/TRAINING PROGRAM

## 2019-09-19 PROCEDURE — 99999 PR PBB SHADOW E&M-EST. PATIENT-LVL IV: CPT | Mod: PBBFAC,HCNC,GC, | Performed by: STUDENT IN AN ORGANIZED HEALTH CARE EDUCATION/TRAINING PROGRAM

## 2019-09-19 PROCEDURE — 63600175 PHARM REV CODE 636 W HCPCS: Mod: HCNC | Performed by: INTERNAL MEDICINE

## 2019-09-19 PROCEDURE — 99215 PR OFFICE/OUTPT VISIT, EST, LEVL V, 40-54 MIN: ICD-10-PCS | Mod: HCNC,GC,S$GLB, | Performed by: STUDENT IN AN ORGANIZED HEALTH CARE EDUCATION/TRAINING PROGRAM

## 2019-09-19 PROCEDURE — 96367 TX/PROPH/DG ADDL SEQ IV INF: CPT | Mod: HCNC

## 2019-09-19 PROCEDURE — 99215 OFFICE O/P EST HI 40 MIN: CPT | Mod: HCNC,GC,S$GLB, | Performed by: STUDENT IN AN ORGANIZED HEALTH CARE EDUCATION/TRAINING PROGRAM

## 2019-09-19 PROCEDURE — 1101F PT FALLS ASSESS-DOCD LE1/YR: CPT | Mod: HCNC,CPTII,GC,S$GLB | Performed by: STUDENT IN AN ORGANIZED HEALTH CARE EDUCATION/TRAINING PROGRAM

## 2019-09-19 PROCEDURE — 3074F SYST BP LT 130 MM HG: CPT | Mod: HCNC,CPTII,GC,S$GLB | Performed by: STUDENT IN AN ORGANIZED HEALTH CARE EDUCATION/TRAINING PROGRAM

## 2019-09-19 PROCEDURE — 96365 THER/PROPH/DIAG IV INF INIT: CPT

## 2019-09-19 PROCEDURE — 1101F PR PT FALLS ASSESS DOC 0-1 FALLS W/OUT INJ PAST YR: ICD-10-PCS | Mod: HCNC,CPTII,GC,S$GLB | Performed by: STUDENT IN AN ORGANIZED HEALTH CARE EDUCATION/TRAINING PROGRAM

## 2019-09-19 PROCEDURE — 3078F DIAST BP <80 MM HG: CPT | Mod: HCNC,CPTII,GC,S$GLB | Performed by: STUDENT IN AN ORGANIZED HEALTH CARE EDUCATION/TRAINING PROGRAM

## 2019-09-19 PROCEDURE — 99999 PR PBB SHADOW E&M-EST. PATIENT-LVL IV: ICD-10-PCS | Mod: PBBFAC,HCNC,GC, | Performed by: STUDENT IN AN ORGANIZED HEALTH CARE EDUCATION/TRAINING PROGRAM

## 2019-09-19 PROCEDURE — 96401 CHEMO ANTI-NEOPL SQ/IM: CPT | Mod: HCNC

## 2019-09-19 RX ORDER — HEPARIN 100 UNIT/ML
500 SYRINGE INTRAVENOUS
Status: CANCELLED | OUTPATIENT
Start: 2019-09-26

## 2019-09-19 RX ORDER — HEPARIN 100 UNIT/ML
500 SYRINGE INTRAVENOUS
Status: DISCONTINUED | OUTPATIENT
Start: 2019-09-19 | End: 2019-09-19 | Stop reason: HOSPADM

## 2019-09-19 RX ORDER — BORTEZOMIB 3.5 MG/1
1.3 INJECTION, POWDER, LYOPHILIZED, FOR SOLUTION INTRAVENOUS; SUBCUTANEOUS
Status: CANCELLED | OUTPATIENT
Start: 2019-09-26

## 2019-09-19 RX ORDER — SODIUM CHLORIDE 0.9 % (FLUSH) 0.9 %
10 SYRINGE (ML) INJECTION
Status: CANCELLED | OUTPATIENT
Start: 2019-09-26

## 2019-09-19 RX ORDER — BORTEZOMIB 3.5 MG/1
1.3 INJECTION, POWDER, LYOPHILIZED, FOR SOLUTION INTRAVENOUS; SUBCUTANEOUS
Status: COMPLETED | OUTPATIENT
Start: 2019-09-19 | End: 2019-09-19

## 2019-09-19 RX ORDER — HEPARIN 100 UNIT/ML
500 SYRINGE INTRAVENOUS
Status: CANCELLED | OUTPATIENT
Start: 2019-09-19

## 2019-09-19 RX ORDER — LENALIDOMIDE 25 MG/1
25 CAPSULE ORAL DAILY
Qty: 30 CAPSULE | Refills: 5 | Status: CANCELLED | OUTPATIENT
Start: 2019-09-19

## 2019-09-19 RX ORDER — SODIUM CHLORIDE 0.9 % (FLUSH) 0.9 %
10 SYRINGE (ML) INJECTION
Status: DISCONTINUED | OUTPATIENT
Start: 2019-09-19 | End: 2019-09-19 | Stop reason: HOSPADM

## 2019-09-19 RX ORDER — SODIUM CHLORIDE 0.9 % (FLUSH) 0.9 %
10 SYRINGE (ML) INJECTION
Status: CANCELLED | OUTPATIENT
Start: 2019-09-19

## 2019-09-19 RX ADMIN — SODIUM CHLORIDE 4 MG: 9 INJECTION, SOLUTION INTRAVENOUS at 12:09

## 2019-09-19 RX ADMIN — BORTEZOMIB 2.4 MG: 3.5 INJECTION, POWDER, LYOPHILIZED, FOR SOLUTION INTRAVENOUS; SUBCUTANEOUS at 01:09

## 2019-09-19 NOTE — PROGRESS NOTES
High risk MM;  On c5 VRd with great response  Keep up treatment for now  Marrow, pet 10/3 week  cbc, cmp, serum free light chains, quantitative immunoglobulins, serum electropheresis, serum immunofixation , MD appt (to review restaging)  And veclade on 10/10  Meet with coordinator; 69 w comorbid but may be ok 140 candidate

## 2019-09-19 NOTE — PLAN OF CARE
Problem: Adult Inpatient Plan of Care  Goal: Plan of Care Review  Outcome: Ongoing (interventions implemented as appropriate)  Pt here with friend via c. Pt has difficulty walking long distances. Uses walker at home. Transfer to chair easily. Tolerated her treatment  Well. Pleasant and talkative. In nad on discharge.

## 2019-09-19 NOTE — Clinical Note
We would like to get a PET-CT and bone marrow biopsy (for 10/3 if possible) in OR for this patient in the first week of October. Dr Tineo ok'ed her to continue aspirin, as she has had multiple prior strokes...RTC on 10/10 to review bone marrow and PET-CT results. Would also like to check a CBC, CMP, Phos, SPEP, NATALY, free light chains, and quant immunoglobulins on morning of 10/10, thanks. Will send another message on rest of tx scheduling, thanks...

## 2019-09-19 NOTE — PROGRESS NOTES
"Education materials were provided in the form of "Me and my Marrow" booklet, the BMT program overview, and instructions for aftercare of an autologous stem cell transplant. Information was gathered from Mrs. Thompson as to her motivation to adhere to the commitment of transplant to which she responded she had current interest in pursuing SCT. She has a questionable support system in place with her  being unwell requiring an aide as well. Mrs. Thompson is up to date with her mammogram and colonoscopy. Dental clearance was explained even though she has dentures. She will call to provide contact information to her dentist for clearance. .   I explained the process from here and told her we would be in contact. She had no additional questions at this time. My card was provided to her. She had a friend present at the time of education session.    "

## 2019-09-19 NOTE — H&P (VIEW-ONLY)
PATIENT: Shelby Thompson  MRN: [id number]  DATE: 9/19/2019      Diagnosis:   1. Multiple myeloma not having achieved remission    2. Multiple myeloma, remission status unspecified    3. Metastatic multiple myeloma to bone        Chief Complaint: Follow-up visit    Oncologic History:   Multiple Myeloma- presented w CRAB criteria (Hgb 7.1, hypercalcemia, renal function - Cr of 2.7, T7 vertebral fracture)  Kappa, RISS Stage III (beta-2 micro globulin of 17.5 and 14:20 translocation) High Risk disease   M-protein was 6.75, urine 600 mg of proteinbiopsy on 5/3 showed 70-80% plasma cells; k/l of 693.7, k of 548  CT on 5/15 results as above with left scapula lesion SUV 3, L5 vertebral body lesion SUV 4.7, stable T7 compression fracture and minimally displaced left lateral 4th rib fracture; no activity in previously noted in splenic or hepatic regions     VRD C1 completed on 5/23 - 6/13;l CrCl of 48 and so revilimid 10 mg; pt on acyclovir and aspirin 81, zometa-given on 5/.10 edentulous  Start C2 on 6/20  Currently on C4D8 of Velcade on 8/22     2 prior CVAs (one in 2008, one in 2011)  L breast cancer DCIS stage 0 (s/p lumpectomy and radiation, no endocrine tx due to CVA)  HTN  DM II  Neuropathy, b/l hands  Prior smoker, quit in 2011  Hepatic lesions - vascular per recent MRI in 05/2019; recs per hepatology conference to scans q3 mo anIntolerance   Statin Intolerance - on praluent, PCSK9 inhibitor   HFpEF - EF 55%, diastolic dysfunction  Anxiety/Depression        Oncologic History:   Pt is a 70 yo AAF with PMHx of COPD, two prior CVAs -one hemorhagic and one ishcemic (2008 and 2011, w residual defecitis and weaknessin R side/tremor on R hand), L Breast cancer s/p lumpectomy and radiation for stage 0 DCIS( in 2011, did not do endocrine tx due to CVA at that time), PVcs, DMII (not on current meds, prior A1c of 7.4), HTN, neuropathy of bilateral hands, liver lesions (which are possibly vascular per recent MRI in 05/2019), and  hx of iron defeciency anemia, statin intolerance (on a PCSK9 inihibitor, alirocumab or praluent), anxiety/derpression, new onset Heart Failure, who presents to the clinic for further management of her recently diagnosed multiple myeloma. The pt states that she had not known about heart failure until this past admission, when she was note to have an EF of 55% and grade II diastolic dysfunction w elevated pulm artery pressure of 41; she was admitted to the hospital on 4/30/19 - 5/8/19 for an exacerbation of her worsening dyspnea over the prior two to three weeks as well as intermittent memory loss. She was noted to have O2 sats of 87% in her PCP, Dr. Arevalo's office and intiially was sent to the hospital in the evaluation for a PE. A CTA chest which was completed did not reveal any thrombus in pulmonary arteries but did reveal b/l ground glass opacities. At the time of her admission, she complained about pain under L breast and also pain in her L flank. PAtient does not recall if she had lost weight over the past few months but does think that she lost some weight in this last admission. Since leaving the [facility], the patient has been more dependent on a rollator to move around. Also, the patient has had worsening nausea at home without any vomiting.      The patient had anemia to Hgb of 7.1, corrected calcium of 10.6, decreased renal function with Cr of 2.7, and a new T7 veterbral fracture. She currently has mid/low back pain and L flank pain.  Kappa was noted to be 548, with a k/l ratio of 693.7. Bone marrow biopsy completed on 5/3 revealed a plasma cell population of 70-80% for this patient. She has a PET-CT scheduled for 5/15. Beta2 microlglobulin was noted to be 17.5 and albumin of 2.5. Also, the patient was found to have an IgG kappa monoclonal protein  THe M-protein was noted to be 6.75 g/dL. Urine protein quantification is still pending.  The pt has had numbness /tingling in her hands post CVA, for which she  is on gabapentin 300 mg BID.  The patient also had renal failure initially which dropped to 1.4 yesterday; she is s/p four days of dexamethasone x 40 mg.         The patient had an EGD in 2016 which had revealed non-bleeding angioectasia and also a colonosopy in March 2015 which showed a 5 mm hyperplastic polyp. In addition, the patient has been on iron supplements, with ferrous gluconate 324 BID. During recent admission in 05/2019, The pt did receive 1 U of pRBC in the hospital. Her ferritin is 54; the pt has not required prior trasnsfusion and no history of IV iron use.  Also, the patient is still on allopurinol 300 mg for her suspected TLS noted in the hospital. Uric acid on her discharge was 8.2. Her Hgb was 7.8 on day of discharge for this paitnet.    IN the past, she had a referral to Hepatology for elevated liver enzymes and found to have liver lesions and had subsequent US guided liver biopsy in 2015, which did not reveal fibrosis and was otherwise normal in apparenace. The patient had negative serolologic testing for Gino's, alpha-1 antitrypsin defeciency, hemochromatosis, autoimmune hepatitis, hemochromatosis, and viral hepatiitis at that time. MRI abdomen/pelvis completed on 5/1/19 - pt was found to have multiple intedeterminate hepatic lesions and two additional hepatic lesions from prior ultrasound were suggestive of vascular malformations (CT had revealed hepatic lesions measuring 1.8 cm in hepatic segment 5 and also 1.3 cm in hepatic segment 8). The pt had a splenic lesion, which was noted to be about 2 cm on CT abdomen/pelvis on 4/10/19. Also, the patient had a 1 cm soft tissue lesion anterior to aorta and inferior to 3rd portion of duodenum. She had  thickened endometrium noted on pelvic ultrasound, which was not thought to require any further evaluation given no sx at time, when pt was evaluated by OB/GYN Dr. Teran.      For the heart failure, the ptis on lasix 20 mg daily for diuresis. RUDI box  red stain was completed on BM biopsy and was negative.   She had COPD and quit smokling in 2011, after smoking about 1/2 pack a day for about 42 years. She does not have any regular use of inhalers and does not keep any at home. She is currently on baby aspirin at home. Pt is edentulous.    The patient is seen with her  and also her daughter, Jessica. Also, the patient has one sister with breast cancer and one sister with lung cancer; there is no hematologic malignancy in the family. She is a retired .    Subjective:    C5D8 today on 9/19. Last Zometa on 8/22 and she is due for another dose today. Continuing 20 mg of dex, has two more weeks supply of 10 mg revilimid, and continuign   Initially could not get automatic cuff pressure, Manual BP of 138/84 mm Hg.  She had walked around in the mall with minimal support.   Hgb of 10.3 and stable and plts of 193 K. Cr of 0.9    She had been drinking much water. Weight is stable at 168 pounds   M protein was 0.33 four weeks ago, IgG kappa monoclonal protein. Kappa/lamnda ratio of 1.56 (2/26/1.45)  NEuropathy is stable, mostly in R hand.  Her nose has been running and also ears, has some allergies to pollen, vents at home..discussed trying claritin vs. Zyrtec.    Past Medical History:   Past Medical History:   Diagnosis Date    Acute respiratory failure with hypoxia 4/30/2019    FUETNES (acute kidney injury) 5/1/2019    Allergy     Anemia     Aortic aneurysm     Breast cancer 10/2011    left breast Stage 0 DCIS    Chronic diastolic congestive heart failure 11/6/2015    Colon polyp     GERD (gastroesophageal reflux disease)     History of colonic polyps     HX: breast cancer     Hyperlipemia     Hypertension     ICH (intracerebral hemorrhage)     Major depressive disorder, single episode, mild 6/23/2016    Nuclear sclerosis 7/21/2014    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 7/21/2014    PAD (peripheral artery disease)  11/6/2015    Statin-induced rhabdomyolysis     4/2015     Stroke 4/2011       Past Surgical HIstory:   Past Surgical History:   Procedure Laterality Date    APPENDECTOMY      BIOPSY LIVER AND ULTRASOUND N/A 4/6/2015    Performed by [facility] at [facility] OR 2ND FLR    BREAST BIOPSY Left 10/2011    BREAST LUMPECTOMY Left 2011    DCIS    COLONOSCOPY      COLONOSCOPY N/A 3/26/2015    Performed by Wicho Woodard MD at [facility] ENDO (4TH FLR)    CYST REMOVAL      back    ESOPHAGOGASTRODUODENOSCOPY  07/2016    duodenal angioectasia    ESOPHAGOGASTRODUODENOSCOPY (EGD) N/A 7/27/2016    Performed by Malik Sanchez MD at [facility] ENDO (4TH FLR)    FOOT FRACTURE SURGERY  10/2012    right foot, with R hallux valgus repair    FRACTURE SURGERY Right     broken toe repiar    HEMORRHOID SURGERY      LIVER BIOPSY  04/2015    essentially normal, no fibrosis    TUBAL LIGATION      UPPER GASTROINTESTINAL ENDOSCOPY         Family History:   Family History   Problem Relation Age of Onset    No Known Problems Sister     Cancer Sister 63        Lung Cancer    No Known Problems Mother     Cancer Father     No Known Problems Brother     Hypertension Daughter     Fibroids Daughter         uterine    Hypertension Son     Breast cancer Sister 62    No Known Problems Brother     No Known Problems Maternal Aunt     No Known Problems Maternal Uncle     No Known Problems Paternal Aunt     No Known Problems Paternal Uncle     Hypertension Maternal Grandmother     No Known Problems Maternal Grandfather     No Known Problems Paternal Grandmother     No Known Problems Paternal Grandfather     Ovarian cancer Neg Hx     Colon cancer Neg Hx     Tremor Neg Hx     Amblyopia Neg Hx     Blindness Neg Hx     Cataracts Neg Hx     Diabetes Neg Hx     Glaucoma Neg Hx     Macular degeneration Neg Hx     Retinal detachment Neg Hx     Strabismus Neg Hx     Stroke Neg Hx     Thyroid disease Neg Hx     Esophageal  cancer Neg Hx     Rectal cancer Neg Hx     Stomach cancer Neg Hx     Ulcerative colitis Neg Hx     Crohn's disease Neg Hx     Irritable bowel syndrome Neg Hx     Celiac disease Neg Hx        Social History:  reports that she quit smoking about 8 years ago. Her smoking use included cigarettes. She has a 10.00 pack-year smoking history. She quit smokeless tobacco use about 8 years ago. She reports that she drinks alcohol. She reports that she does not use drugs.    Allergies:  Review of patient's allergies indicates:   Allergen Reactions    Lipitor [atorvastatin]      Itching, elevated cpk, muscle aches, statin induced myositis       Medications:  Current Outpatient Medications   Medication Sig Dispense Refill    acetaminophen (TYLENOL) 650 MG TbSR Take 1,300 mg by mouth daily as needed (pain).      acyclovir (ZOVIRAX) 400 MG tablet Take 1 tablet (400 mg total) by mouth 2 (two) times daily. 120 tablet 2    alirocumab (PRALUENT PEN) 75 mg/mL PnIj Inject 1 mL (75 mg total) into the skin every 14 (fourteen) days. Ask your PCP/ Oncologist 2 mL 11    allopurinol (ZYLOPRIM) 300 MG tablet Take 1 tablet (300 mg total) by mouth once daily. 30 tablet 1    amLODIPine (NORVASC) 5 MG tablet TAKE 1 TABLET BY MOUTH EVERY DAY 90 tablet 3    aspirin 81 mg Tab Take 1 tablet by mouth Daily.      bisacodyl (DULCOLAX) 5 mg EC tablet Take 5 mg by mouth once daily.      dexAMETHasone (DECADRON) 4 MG Tab Take 5 tablets (20 mg total) by mouth once a week. Take 5 tabs or 20 mg weekly, all tabs on same day as weekly Velcade injection 60 tablet 0    ferrous gluconate 324 mg (37.5 mg iron) Tab TAKE 1 TABLET BY MOUTH 2 (TWO) TIMES DAILY WITH MEALS. 180 tablet 0    gabapentin (NEURONTIN) 300 MG capsule TAKE 1 CAPSULE (300 MG TOTAL) BY MOUTH 2 (TWO) TIMES DAILY. 180 capsule 3    omeprazole (PRILOSEC) 40 MG capsule TAKE 1 CAPSULE (40 MG TOTAL) BY MOUTH ONCE DAILY. 90 capsule 0    ondansetron (ZOFRAN) 4 MG tablet Take 1 tablet (4 mg  total) by mouth every 6 (six) hours as needed for Nausea. 30 tablet 1    ondansetron (ZOFRAN-ODT) 8 MG TbDL Take 1 tablet (8 mg total) by mouth every 8 (eight) hours as needed (nausea/vomiting). 60 tablet 1    REVLIMID 25 mg Cap TAKE 1 CAPSULE (25MG TOTAL) BY MOUTH ONCE DAILY 21 capsule 0    sertraline (ZOLOFT) 50 MG tablet TAKE 1 TABLET (50 MG TOTAL) BY MOUTH ONCE DAILY. 90 tablet 3    vitamin D 1000 units Tab Take 185 mg by mouth once daily.      traMADol (ULTRAM) 50 mg tablet Take 1 tablet (50 mg total) by mouth 2 (two) times daily as needed for Pain. 60 tablet 3     Current Facility-Administered Medications   Medication Dose Route Frequency Provider Last Rate Last Dose    zoledronic acid (ZOMETA) 4 mg in sodium chloride 0.9% 100 mL IVPB  4 mg Intravenous 1 time in Clinic/HOD Genny Napoles MD         Facility-Administered Medications Ordered in Other Visits   Medication Dose Route Frequency Provider Last Rate Last Dose    alteplase injection 2 mg  2 mg Intra-Catheter PRN Jere Tineo MD        alteplase injection 2 mg  2 mg Intra-Catheter PRN Jere Tineo MD        heparin, porcine (PF) 100 unit/mL injection flush 500 Units  500 Units Intravenous PRN Jere Tineo MD        heparin, porcine (PF) 100 unit/mL injection flush 500 Units  500 Units Intravenous PRTINY Tineo MD        sodium chloride 0.9% flush 10 mL  10 mL Intravenous PRN Jere Tineo MD        sodium chloride 0.9% flush 10 mL  10 mL Intravenous PRN Jere Tineo MD           Review of Systems   Constitutional: Negative for fatigue and unexpected weight change.   HENT: Positive for rhinorrhea.    Respiratory: Negative for cough.    Cardiovascular: Negative for chest pain and palpitations.   Gastrointestinal: Negative for abdominal pain.   Genitourinary: Negative for difficulty urinating and dysuria.   Musculoskeletal: Positive for back pain and gait problem.   Skin: Negative for rash.  "  Neurological: Positive for tremors, weakness and numbness. Negative for headaches.   Hematological: Negative for adenopathy.       ECOG Performance Status: 1   Objective:      Vitals:   Vitals:    09/19/19 1029   Pulse: (!) 57   Resp: 16   Temp: 97.9 °F (36.6 °C)   TempSrc: Oral   SpO2: 99%   Weight: 76.3 kg (168 lb 3.4 oz)   Height: 5' 3" (1.6 m)             Physical Exam   Constitutional: She is oriented to person, place, and time. She appears well-developed and well-nourished. No distress.   HENT:   Head: Normocephalic and atraumatic.   Mouth/Throat: Oropharynx is clear and moist.   Eyes: Pupils are equal, round, and reactive to light. Conjunctivae and EOM are normal.   Neck: Normal range of motion. Neck supple.   Cardiovascular: Normal rate, regular rhythm and normal heart sounds. Exam reveals no gallop and no friction rub.   No murmur heard.  Pulmonary/Chest: Effort normal and breath sounds normal. No respiratory distress. She has no wheezes. She has no rales.   Abdominal: Soft. Bowel sounds are normal. She exhibits no distension. There is no tenderness. There is no guarding.   Musculoskeletal: Normal range of motion. She exhibits no edema.   Lymphadenopathy:     She has no cervical adenopathy.   Neurological: She is alert and oriented to person, place, and time. No cranial nerve deficit.   Intentional R hand tremor; RUE - 4/5 strength and RLE extremity also 4/5; full strengths on LUE and LLE   Skin: Skin is warm and dry. No rash noted.       Laboratory Data:  Lab Visit on 09/19/2019   Component Date Value Ref Range Status    WBC 09/19/2019 3.01* 3.90 - 12.70 K/uL Final    RBC 09/19/2019 4.65  4.00 - 5.40 M/uL Final    Hemoglobin 09/19/2019 10.3* 12.0 - 16.0 g/dL Final    Hematocrit 09/19/2019 35.0* 37.0 - 48.5 % Final    Mean Corpuscular Volume 09/19/2019 75* 82 - 98 fL Final    Mean Corpuscular Hemoglobin 09/19/2019 22.2* 27.0 - 31.0 pg Final    Mean Corpuscular Hemoglobin Conc 09/19/2019 29.4* 32.0 " - 36.0 g/dL Final    RDW 09/19/2019 20.4* 11.5 - 14.5 % Final    Platelets 09/19/2019 193  150 - 350 K/uL Final    MPV 09/19/2019 SEE COMMENT  9.2 - 12.9 fL Final    Result not available.    Immature Granulocytes 09/19/2019 0.3  0.0 - 0.5 % Final    Gran # (ANC) 09/19/2019 1.9  1.8 - 7.7 K/uL Final    Immature Grans (Abs) 09/19/2019 0.01  0.00 - 0.04 K/uL Final    Comment: Mild elevation in immature granulocytes is non specific and   can be seen in a variety of conditions including stress response,   acute inflammation, trauma and pregnancy. Correlation with other   laboratory and clinical findings is essential.      Lymph # 09/19/2019 0.5* 1.0 - 4.8 K/uL Final    Mono # 09/19/2019 0.3  0.3 - 1.0 K/uL Final    Eos # 09/19/2019 0.3  0.0 - 0.5 K/uL Final    Baso # 09/19/2019 0.04  0.00 - 0.20 K/uL Final    nRBC 09/19/2019 0  0 /100 WBC Final    Gran% 09/19/2019 62.2  38.0 - 73.0 % Final    Lymph% 09/19/2019 16.3* 18.0 - 48.0 % Final    Mono% 09/19/2019 11.3  4.0 - 15.0 % Final    Eosinophil% 09/19/2019 8.6* 0.0 - 8.0 % Final    Basophil% 09/19/2019 1.3  0.0 - 1.9 % Final    Aniso 09/19/2019 Slight   Final    Poik 09/19/2019 Slight   Final    Poly 09/19/2019 Occasional   Final    Ovalocytes 09/19/2019 Occasional   Final    Fatimah Cells 09/19/2019 Occasional   Final    Differential Method 09/19/2019 Automated   Final    Sodium 09/19/2019 146* 136 - 145 mmol/L Final    Potassium 09/19/2019 3.6  3.5 - 5.1 mmol/L Final    Chloride 09/19/2019 106  95 - 110 mmol/L Final    CO2 09/19/2019 28  23 - 29 mmol/L Final    Glucose 09/19/2019 100  70 - 110 mg/dL Final    BUN, Bld 09/19/2019 9  8 - 23 mg/dL Final    Creatinine 09/19/2019 0.9  0.5 - 1.4 mg/dL Final    Calcium 09/19/2019 8.6* 8.7 - 10.5 mg/dL Final    Total Protein 09/19/2019 7.1  6.0 - 8.4 g/dL Final    Albumin 09/19/2019 3.8  3.5 - 5.2 g/dL Final    Total Bilirubin 09/19/2019 0.4  0.1 - 1.0 mg/dL Final    Comment: For infants and  newborns, interpretation of results should be based  on gestational age, weight and in agreement with clinical  observations.  Premature Infant recommended reference ranges:  Up to 24 hours.............<8.0 mg/dL  Up to 48 hours............<12.0 mg/dL  3-5 days..................<15.0 mg/dL  6-29 days.................<15.0 mg/dL      Alkaline Phosphatase 09/19/2019 67  55 - 135 U/L Final    AST 09/19/2019 15  10 - 40 U/L Final    ALT 09/19/2019 13  10 - 44 U/L Final    Anion Gap 09/19/2019 12  8 - 16 mmol/L Corrected    Corrected result; previously reported as 7 on 09/19/2019 at 10:40.    eGFR if African American 09/19/2019 >60.0  >60 mL/min/1.73 m^2 Final    eGFR if non African American 09/19/2019 >60.0  >60 mL/min/1.73 m^2 Final    Comment: Calculation used to obtain the estimated glomerular filtration  rate (eGFR) is the CKD-EPI equation.       Magnesium 09/19/2019 1.9  1.6 - 2.6 mg/dL Final    Phosphorus 09/19/2019 2.4* 2.7 - 4.5 mg/dL Final    Protein, Serum 09/19/2019 6.6  6.0 - 8.4 g/dL Final    Comment: Serum protein electrophoresis and immunofixation results should be   interpreted in clinical context in that some therapeutic agents can   result   in false positive results (example, daratumumab). Correlation with   the   patient s therapeutic regimen is required.      IgG - Serum 09/19/2019 984  650 - 1600 mg/dL Final    IgG Cord Blood Reference Range: 650-1600 mg/dL.    IgA 09/19/2019 62  40 - 350 mg/dL Final    IgA Cord Blood Reference Range: <5 mg/dL.    IgM 09/19/2019 23* 50 - 300 mg/dL Final    IgM Cord Blood Reference Range: <25 mg/dL.         Imaging:     EXAMINATION:  MRI ABDOMEN W WO CONTRAST    CLINICAL HISTORY:  Liver masses, probably benign on screening after primary tumor treated;  Hepatomegaly, not elsewhere classified    TECHNIQUE:  Multisequence multiplanar MRI examination of the abdomen obtained before and after the administration of intravenous contrast 10 mL  Gadavist.    COMPARISON:  MRI abdomen 05/31/2019.  CT abdomen and pelvis 04/10/2019.    FINDINGS:  Lung bases, heart, pericardium appear within normal limits.    Small hiatal hernia present.    Liver is upper normal in size.  Redemonstration of multiple serpiginous structures in the distribution of the portal and hepatic venous system most suggestive of portosystemic shunts.  These appear similar to prior exam.  No focal liver lesions are identified. Previously seen subcentimeter enhancing lesions within the liver are no longer visualized and presumably vascular in origin and related to shunting.    Gallbladder is normal.  No intra or extrahepatic biliary ductal dilatation.  Spleen is normal in size.  Stable enhancing structures in the superior aspect of the spleen, presumably meningioma.    Stomach, pancreas, and adrenal glands show no significant abnormalities.    Kidneys are normal in size and location.  No renal mass or hydronephrosis.  Visualized portions of the ureters demonstrate no significant abnormalities.    No bowel obstruction or inflammation.  No ascites or free air.  No lymphadenopathy in the abdomen.  Abdominal aorta is slightly tortuous.  Mild fusiform dilatation of the descending thoracic aorta, unchanged from prior.      Impression       Redemonstration of multiple serpiginous structures in the distribution of the portal and hepatic venous system most suggestive of portosystemic shunts.  No liver masses.  The appearance is similar to prior examination.  Nevertheless given nonspecific, continued follow-up suggested.    Stable small splenic enhancing lesion, likely splenic hemangioma.    Stable descending thoracic aorta aneurysm.    Small hiatal hernia.    Additional findings as above.    Electronically signed by resident: Mina Call  Date: 09/16/2019  Time: 09:07    Electronically signed by: Elton Taylor MD  Date: 09/16/2019  Time: 10:17           Assessment:       1. Multiple myeloma not  having achieved remission    2. Multiple myeloma, remission status unspecified    3. Metastatic multiple myeloma to bone           Plan:     Multiple Myeloma  - ECOG PS 1  - pt met most of Crab criteria in hospital - had anemia (hgb 7.1) corrected calcium of 10.6, renal function with Cr of 2.7 and T7 verterbral fracture  - Kappa was noted to be 548, with a k/l ratio of 693.7.  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  At  diagnosis, this translocation has an unfavorable prognosis in multiple myeloma (Correa et al., Blood [id number], 2003).  - 14:20 translocation will place this as R-ISS Stage III, high risk disease  - Bone marrow biopsy completed on 5/3 revealed a plasma cell population of 70-80% for this patient  -  PET-CT on 5/15 results as above with left scapula lesion SUV 3, L5 vertebral body lesion SUV 4.7, stable T7 compression fracture and minimally displaced left lateral 4th rib fracture; no activity in previously noted in splenic or hepatic regions   -SPEP reveals M-protein was noted to be 6.75 g/dL, NATALY reveals IgG kappa monoclonal protein    - Urine protein 24h quantification, 663 mg   - s/p four doses of dexamethasone 40 mg, w last dose on 5/9  - Plan for velcade 1.3 mg/m2 weekly on days 1,8.15.22 for 28 day cycles, also will plan to dose dexamethasone 40 mg weekly as well on days of velcade shots; currently revilimid at 10 mg/day  - Can dose revilmid to 25 mg a day; decrease dexamethasone to 20 mg a day  - went over risks of neuropathy, blood clots, VZV reactivation, and GI symptoms such as nausea, diarrhea and pt also asked to call us and/or come to ER for new fevers greater than 100.4F or new onset brusing bleeding. Pt agrees to proceed with therapy and has had  sign consent form due to limited dexterity with R hand  -  continue aspirin 81  mg daily; continue acyclovir 400 BID, discussed risks of shingles if this medication is not included in regimen  - Hgb of 10.2 today   - plan for zometa monthly, no need for dental eval as she has no current known jaw breakdown and is edentulous; can do 4 mg monthly  - C1 of VRD 5/23 - 6/19  - Currently on C5D8 of Velcade, as of 9/19  - Pending SPEP, NATALY, light chains,and quant immunoglobulins  - Pt met with transplant coordinator and is to be evaluated for transplant     Lower extremity edema, worse on L leg  - DVT has been ruled out, better during this visit  -continue dexmethasone to 20 mg  - continue compression stokcings     Nausea/ vomiting  - controlled on zofran at first sign of nausea  - will add secondary medications for additional control too if this becomes worse or uncontrolled      Malnutrition/loss of appetite  - discussed with SW about arranging pt with boost/ensure      Anemia  - low ferritin in years past 4-8, currently is 54 on 4/30/19 while on iron supplementation  - prior GI eval in 2015 and 16  - will continue to monitor for worsening anemia and signs sx of bleeding     FUENTES  - 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 Tineo      F/u:   Increase dose of revilimid to 25 mg  PET-CT and bone marrow biopsy  in OR for this patient during first week of October; Dr Tineo ok'ed her to continue aspirin, as she has had multiple prior strokes  RTC on 10/10 to review bone marrow and PET-CT results  Check a CBC, CMP, Phos, SPEP, NATALY, free light chains, and quant immunoglobulins on morning of 10/10  Velcade scheduled until 10/17 and needs zometa on 10/17  Additional discussions and scheduling after next visit per BMT team         Genny Napoles MD  Hematology and Oncology PGY V  [facility]

## 2019-09-19 NOTE — Clinical Note
She is scheduled for velcade until 10/17 already and so will discuss additional dates based on eval at next appointment. Please schedule next zometa for 10/17, thanks.

## 2019-09-19 NOTE — PROGRESS NOTES
PATIENT: Shelby Thompson  MRN: [id number]  DATE: 9/19/2019      Diagnosis:   1. Multiple myeloma not having achieved remission    2. Multiple myeloma, remission status unspecified    3. Metastatic multiple myeloma to bone        Chief Complaint: Follow-up visit    Oncologic History:   Multiple Myeloma- presented w CRAB criteria (Hgb 7.1, hypercalcemia, renal function - Cr of 2.7, T7 vertebral fracture)  Kappa, RISS Stage III (beta-2 micro globulin of 17.5 and 14:20 translocation) High Risk disease   M-protein was 6.75, urine 600 mg of proteinbiopsy on 5/3 showed 70-80% plasma cells; k/l of 693.7, k of 548  CT on 5/15 results as above with left scapula lesion SUV 3, L5 vertebral body lesion SUV 4.7, stable T7 compression fracture and minimally displaced left lateral 4th rib fracture; no activity in previously noted in splenic or hepatic regions     VRD C1 completed on 5/23 - 6/13;l CrCl of 48 and so revilimid 10 mg; pt on acyclovir and aspirin 81, zometa-given on 5/.10 edentulous  Start C2 on 6/20  Currently on C4D8 of Velcade on 8/22     2 prior CVAs (one in 2008, one in 2011)  L breast cancer DCIS stage 0 (s/p lumpectomy and radiation, no endocrine tx due to CVA)  HTN  DM II  Neuropathy, b/l hands  Prior smoker, quit in 2011  Hepatic lesions - vascular per recent MRI in 05/2019; recs per hepatology conference to scans q3 mo anIntolerance   Statin Intolerance - on praluent, PCSK9 inhibitor   HFpEF - EF 55%, diastolic dysfunction  Anxiety/Depression        Oncologic History:   Pt is a 70 yo AAF with PMHx of COPD, two prior CVAs -one hemorhagic and one ishcemic (2008 and 2011, w residual defecitis and weaknessin R side/tremor on R hand), L Breast cancer s/p lumpectomy and radiation for stage 0 DCIS( in 2011, did not do endocrine tx due to CVA at that time), PVcs, DMII (not on current meds, prior A1c of 7.4), HTN, neuropathy of bilateral hands, liver lesions (which are possibly vascular per recent MRI in 05/2019), and  hx of iron defeciency anemia, statin intolerance (on a PCSK9 inihibitor, alirocumab or praluent), anxiety/derpression, new onset Heart Failure, who presents to the clinic for further management of her recently diagnosed multiple myeloma. The pt states that she had not known about heart failure until this past admission, when she was note to have an EF of 55% and grade II diastolic dysfunction w elevated pulm artery pressure of 41; she was admitted to the hospital on 4/30/19 - 5/8/19 for an exacerbation of her worsening dyspnea over the prior two to three weeks as well as intermittent memory loss. She was noted to have O2 sats of 87% in her PCP, Dr. Arevalo's office and intiially was sent to the hospital in the evaluation for a PE. A CTA chest which was completed did not reveal any thrombus in pulmonary arteries but did reveal b/l ground glass opacities. At the time of her admission, she complained about pain under L breast and also pain in her L flank. PAtient does not recall if she had lost weight over the past few months but does think that she lost some weight in this last admission. Since leaving the [facility], the patient has been more dependent on a rollator to move around. Also, the patient has had worsening nausea at home without any vomiting.      The patient had anemia to Hgb of 7.1, corrected calcium of 10.6, decreased renal function with Cr of 2.7, and a new T7 veterbral fracture. She currently has mid/low back pain and L flank pain.  Kappa was noted to be 548, with a k/l ratio of 693.7. Bone marrow biopsy completed on 5/3 revealed a plasma cell population of 70-80% for this patient. She has a PET-CT scheduled for 5/15. Beta2 microlglobulin was noted to be 17.5 and albumin of 2.5. Also, the patient was found to have an IgG kappa monoclonal protein  THe M-protein was noted to be 6.75 g/dL. Urine protein quantification is still pending.  The pt has had numbness /tingling in her hands post CVA, for which she  is on gabapentin 300 mg BID.  The patient also had renal failure initially which dropped to 1.4 yesterday; she is s/p four days of dexamethasone x 40 mg.         The patient had an EGD in 2016 which had revealed non-bleeding angioectasia and also a colonosopy in March 2015 which showed a 5 mm hyperplastic polyp. In addition, the patient has been on iron supplements, with ferrous gluconate 324 BID. During recent admission in 05/2019, The pt did receive 1 U of pRBC in the hospital. Her ferritin is 54; the pt has not required prior trasnsfusion and no history of IV iron use.  Also, the patient is still on allopurinol 300 mg for her suspected TLS noted in the hospital. Uric acid on her discharge was 8.2. Her Hgb was 7.8 on day of discharge for this paitnet.    IN the past, she had a referral to Hepatology for elevated liver enzymes and found to have liver lesions and had subsequent US guided liver biopsy in 2015, which did not reveal fibrosis and was otherwise normal in apparenace. The patient had negative serolologic testing for Gino's, alpha-1 antitrypsin defeciency, hemochromatosis, autoimmune hepatitis, hemochromatosis, and viral hepatiitis at that time. MRI abdomen/pelvis completed on 5/1/19 - pt was found to have multiple intedeterminate hepatic lesions and two additional hepatic lesions from prior ultrasound were suggestive of vascular malformations (CT had revealed hepatic lesions measuring 1.8 cm in hepatic segment 5 and also 1.3 cm in hepatic segment 8). The pt had a splenic lesion, which was noted to be about 2 cm on CT abdomen/pelvis on 4/10/19. Also, the patient had a 1 cm soft tissue lesion anterior to aorta and inferior to 3rd portion of duodenum. She had  thickened endometrium noted on pelvic ultrasound, which was not thought to require any further evaluation given no sx at time, when pt was evaluated by OB/GYN Dr. Teran.      For the heart failure, the ptis on lasix 20 mg daily for diuresis. RUDI box  red stain was completed on BM biopsy and was negative.   She had COPD and quit smokling in 2011, after smoking about 1/2 pack a day for about 42 years. She does not have any regular use of inhalers and does not keep any at home. She is currently on baby aspirin at home. Pt is edentulous.    The patient is seen with her  and also her daughter, Jessica. Also, the patient has one sister with breast cancer and one sister with lung cancer; there is no hematologic malignancy in the family. She is a retired .    Subjective:    C5D8 today on 9/19. Last Zometa on 8/22 and she is due for another dose today. Continuing 20 mg of dex, has two more weeks supply of 10 mg revilimid, and continuign   Initially could not get automatic cuff pressure, Manual BP of 138/84 mm Hg.  She had walked around in the mall with minimal support.   Hgb of 10.3 and stable and plts of 193 K. Cr of 0.9    She had been drinking much water. Weight is stable at 168 pounds   M protein was 0.33 four weeks ago, IgG kappa monoclonal protein. Kappa/lamnda ratio of 1.56 (2/26/1.45)  NEuropathy is stable, mostly in R hand.  Her nose has been running and also ears, has some allergies to pollen, vents at home..discussed trying claritin vs. Zyrtec.    Past Medical History:   Past Medical History:   Diagnosis Date    Acute respiratory failure with hypoxia 4/30/2019    FUENTES (acute kidney injury) 5/1/2019    Allergy     Anemia     Aortic aneurysm     Breast cancer 10/2011    left breast Stage 0 DCIS    Chronic diastolic congestive heart failure 11/6/2015    Colon polyp     GERD (gastroesophageal reflux disease)     History of colonic polyps     HX: breast cancer     Hyperlipemia     Hypertension     ICH (intracerebral hemorrhage)     Major depressive disorder, single episode, mild 6/23/2016    Nuclear sclerosis 7/21/2014    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 7/21/2014    PAD (peripheral artery disease)  11/6/2015    Statin-induced rhabdomyolysis     4/2015     Stroke 4/2011       Past Surgical HIstory:   Past Surgical History:   Procedure Laterality Date    APPENDECTOMY      BIOPSY LIVER AND ULTRASOUND N/A 4/6/2015    Performed by [facility] at [facility] OR 2ND FLR    BREAST BIOPSY Left 10/2011    BREAST LUMPECTOMY Left 2011    DCIS    COLONOSCOPY      COLONOSCOPY N/A 3/26/2015    Performed by Wicho Woodard MD at [facility] ENDO (4TH FLR)    CYST REMOVAL      back    ESOPHAGOGASTRODUODENOSCOPY  07/2016    duodenal angioectasia    ESOPHAGOGASTRODUODENOSCOPY (EGD) N/A 7/27/2016    Performed by Malik Sanchez MD at [facility] ENDO (4TH FLR)    FOOT FRACTURE SURGERY  10/2012    right foot, with R hallux valgus repair    FRACTURE SURGERY Right     broken toe repiar    HEMORRHOID SURGERY      LIVER BIOPSY  04/2015    essentially normal, no fibrosis    TUBAL LIGATION      UPPER GASTROINTESTINAL ENDOSCOPY         Family History:   Family History   Problem Relation Age of Onset    No Known Problems Sister     Cancer Sister 63        Lung Cancer    No Known Problems Mother     Cancer Father     No Known Problems Brother     Hypertension Daughter     Fibroids Daughter         uterine    Hypertension Son     Breast cancer Sister 62    No Known Problems Brother     No Known Problems Maternal Aunt     No Known Problems Maternal Uncle     No Known Problems Paternal Aunt     No Known Problems Paternal Uncle     Hypertension Maternal Grandmother     No Known Problems Maternal Grandfather     No Known Problems Paternal Grandmother     No Known Problems Paternal Grandfather     Ovarian cancer Neg Hx     Colon cancer Neg Hx     Tremor Neg Hx     Amblyopia Neg Hx     Blindness Neg Hx     Cataracts Neg Hx     Diabetes Neg Hx     Glaucoma Neg Hx     Macular degeneration Neg Hx     Retinal detachment Neg Hx     Strabismus Neg Hx     Stroke Neg Hx     Thyroid disease Neg Hx     Esophageal  cancer Neg Hx     Rectal cancer Neg Hx     Stomach cancer Neg Hx     Ulcerative colitis Neg Hx     Crohn's disease Neg Hx     Irritable bowel syndrome Neg Hx     Celiac disease Neg Hx        Social History:  reports that she quit smoking about 8 years ago. Her smoking use included cigarettes. She has a 10.00 pack-year smoking history. She quit smokeless tobacco use about 8 years ago. She reports that she drinks alcohol. She reports that she does not use drugs.    Allergies:  Review of patient's allergies indicates:   Allergen Reactions    Lipitor [atorvastatin]      Itching, elevated cpk, muscle aches, statin induced myositis       Medications:  Current Outpatient Medications   Medication Sig Dispense Refill    acetaminophen (TYLENOL) 650 MG TbSR Take 1,300 mg by mouth daily as needed (pain).      acyclovir (ZOVIRAX) 400 MG tablet Take 1 tablet (400 mg total) by mouth 2 (two) times daily. 120 tablet 2    alirocumab (PRALUENT PEN) 75 mg/mL PnIj Inject 1 mL (75 mg total) into the skin every 14 (fourteen) days. Ask your PCP/ Oncologist 2 mL 11    allopurinol (ZYLOPRIM) 300 MG tablet Take 1 tablet (300 mg total) by mouth once daily. 30 tablet 1    amLODIPine (NORVASC) 5 MG tablet TAKE 1 TABLET BY MOUTH EVERY DAY 90 tablet 3    aspirin 81 mg Tab Take 1 tablet by mouth Daily.      bisacodyl (DULCOLAX) 5 mg EC tablet Take 5 mg by mouth once daily.      dexAMETHasone (DECADRON) 4 MG Tab Take 5 tablets (20 mg total) by mouth once a week. Take 5 tabs or 20 mg weekly, all tabs on same day as weekly Velcade injection 60 tablet 0    ferrous gluconate 324 mg (37.5 mg iron) Tab TAKE 1 TABLET BY MOUTH 2 (TWO) TIMES DAILY WITH MEALS. 180 tablet 0    gabapentin (NEURONTIN) 300 MG capsule TAKE 1 CAPSULE (300 MG TOTAL) BY MOUTH 2 (TWO) TIMES DAILY. 180 capsule 3    omeprazole (PRILOSEC) 40 MG capsule TAKE 1 CAPSULE (40 MG TOTAL) BY MOUTH ONCE DAILY. 90 capsule 0    ondansetron (ZOFRAN) 4 MG tablet Take 1 tablet (4 mg  total) by mouth every 6 (six) hours as needed for Nausea. 30 tablet 1    ondansetron (ZOFRAN-ODT) 8 MG TbDL Take 1 tablet (8 mg total) by mouth every 8 (eight) hours as needed (nausea/vomiting). 60 tablet 1    REVLIMID 25 mg Cap TAKE 1 CAPSULE (25MG TOTAL) BY MOUTH ONCE DAILY 21 capsule 0    sertraline (ZOLOFT) 50 MG tablet TAKE 1 TABLET (50 MG TOTAL) BY MOUTH ONCE DAILY. 90 tablet 3    vitamin D 1000 units Tab Take 185 mg by mouth once daily.      traMADol (ULTRAM) 50 mg tablet Take 1 tablet (50 mg total) by mouth 2 (two) times daily as needed for Pain. 60 tablet 3     Current Facility-Administered Medications   Medication Dose Route Frequency Provider Last Rate Last Dose    zoledronic acid (ZOMETA) 4 mg in sodium chloride 0.9% 100 mL IVPB  4 mg Intravenous 1 time in Clinic/HOD Genny Napoles MD         Facility-Administered Medications Ordered in Other Visits   Medication Dose Route Frequency Provider Last Rate Last Dose    alteplase injection 2 mg  2 mg Intra-Catheter PRN Jere Tineo MD        alteplase injection 2 mg  2 mg Intra-Catheter PRN Jere Tineo MD        heparin, porcine (PF) 100 unit/mL injection flush 500 Units  500 Units Intravenous PRN Jere Tineo MD        heparin, porcine (PF) 100 unit/mL injection flush 500 Units  500 Units Intravenous PRTINY Tineo MD        sodium chloride 0.9% flush 10 mL  10 mL Intravenous PRN Jere Tineo MD        sodium chloride 0.9% flush 10 mL  10 mL Intravenous PRN Jere Tineo MD           Review of Systems   Constitutional: Negative for fatigue and unexpected weight change.   HENT: Positive for rhinorrhea.    Respiratory: Negative for cough.    Cardiovascular: Negative for chest pain and palpitations.   Gastrointestinal: Negative for abdominal pain.   Genitourinary: Negative for difficulty urinating and dysuria.   Musculoskeletal: Positive for back pain and gait problem.   Skin: Negative for rash.  "  Neurological: Positive for tremors, weakness and numbness. Negative for headaches.   Hematological: Negative for adenopathy.       ECOG Performance Status: 1   Objective:      Vitals:   Vitals:    09/19/19 1029   Pulse: (!) 57   Resp: 16   Temp: 97.9 °F (36.6 °C)   TempSrc: Oral   SpO2: 99%   Weight: 76.3 kg (168 lb 3.4 oz)   Height: 5' 3" (1.6 m)             Physical Exam   Constitutional: She is oriented to person, place, and time. She appears well-developed and well-nourished. No distress.   HENT:   Head: Normocephalic and atraumatic.   Mouth/Throat: Oropharynx is clear and moist.   Eyes: Pupils are equal, round, and reactive to light. Conjunctivae and EOM are normal.   Neck: Normal range of motion. Neck supple.   Cardiovascular: Normal rate, regular rhythm and normal heart sounds. Exam reveals no gallop and no friction rub.   No murmur heard.  Pulmonary/Chest: Effort normal and breath sounds normal. No respiratory distress. She has no wheezes. She has no rales.   Abdominal: Soft. Bowel sounds are normal. She exhibits no distension. There is no tenderness. There is no guarding.   Musculoskeletal: Normal range of motion. She exhibits no edema.   Lymphadenopathy:     She has no cervical adenopathy.   Neurological: She is alert and oriented to person, place, and time. No cranial nerve deficit.   Intentional R hand tremor; RUE - 4/5 strength and RLE extremity also 4/5; full strengths on LUE and LLE   Skin: Skin is warm and dry. No rash noted.       Laboratory Data:  Lab Visit on 09/19/2019   Component Date Value Ref Range Status    WBC 09/19/2019 3.01* 3.90 - 12.70 K/uL Final    RBC 09/19/2019 4.65  4.00 - 5.40 M/uL Final    Hemoglobin 09/19/2019 10.3* 12.0 - 16.0 g/dL Final    Hematocrit 09/19/2019 35.0* 37.0 - 48.5 % Final    Mean Corpuscular Volume 09/19/2019 75* 82 - 98 fL Final    Mean Corpuscular Hemoglobin 09/19/2019 22.2* 27.0 - 31.0 pg Final    Mean Corpuscular Hemoglobin Conc 09/19/2019 29.4* 32.0 " - 36.0 g/dL Final    RDW 09/19/2019 20.4* 11.5 - 14.5 % Final    Platelets 09/19/2019 193  150 - 350 K/uL Final    MPV 09/19/2019 SEE COMMENT  9.2 - 12.9 fL Final    Result not available.    Immature Granulocytes 09/19/2019 0.3  0.0 - 0.5 % Final    Gran # (ANC) 09/19/2019 1.9  1.8 - 7.7 K/uL Final    Immature Grans (Abs) 09/19/2019 0.01  0.00 - 0.04 K/uL Final    Comment: Mild elevation in immature granulocytes is non specific and   can be seen in a variety of conditions including stress response,   acute inflammation, trauma and pregnancy. Correlation with other   laboratory and clinical findings is essential.      Lymph # 09/19/2019 0.5* 1.0 - 4.8 K/uL Final    Mono # 09/19/2019 0.3  0.3 - 1.0 K/uL Final    Eos # 09/19/2019 0.3  0.0 - 0.5 K/uL Final    Baso # 09/19/2019 0.04  0.00 - 0.20 K/uL Final    nRBC 09/19/2019 0  0 /100 WBC Final    Gran% 09/19/2019 62.2  38.0 - 73.0 % Final    Lymph% 09/19/2019 16.3* 18.0 - 48.0 % Final    Mono% 09/19/2019 11.3  4.0 - 15.0 % Final    Eosinophil% 09/19/2019 8.6* 0.0 - 8.0 % Final    Basophil% 09/19/2019 1.3  0.0 - 1.9 % Final    Aniso 09/19/2019 Slight   Final    Poik 09/19/2019 Slight   Final    Poly 09/19/2019 Occasional   Final    Ovalocytes 09/19/2019 Occasional   Final    Fatimah Cells 09/19/2019 Occasional   Final    Differential Method 09/19/2019 Automated   Final    Sodium 09/19/2019 146* 136 - 145 mmol/L Final    Potassium 09/19/2019 3.6  3.5 - 5.1 mmol/L Final    Chloride 09/19/2019 106  95 - 110 mmol/L Final    CO2 09/19/2019 28  23 - 29 mmol/L Final    Glucose 09/19/2019 100  70 - 110 mg/dL Final    BUN, Bld 09/19/2019 9  8 - 23 mg/dL Final    Creatinine 09/19/2019 0.9  0.5 - 1.4 mg/dL Final    Calcium 09/19/2019 8.6* 8.7 - 10.5 mg/dL Final    Total Protein 09/19/2019 7.1  6.0 - 8.4 g/dL Final    Albumin 09/19/2019 3.8  3.5 - 5.2 g/dL Final    Total Bilirubin 09/19/2019 0.4  0.1 - 1.0 mg/dL Final    Comment: For infants and  newborns, interpretation of results should be based  on gestational age, weight and in agreement with clinical  observations.  Premature Infant recommended reference ranges:  Up to 24 hours.............<8.0 mg/dL  Up to 48 hours............<12.0 mg/dL  3-5 days..................<15.0 mg/dL  6-29 days.................<15.0 mg/dL      Alkaline Phosphatase 09/19/2019 67  55 - 135 U/L Final    AST 09/19/2019 15  10 - 40 U/L Final    ALT 09/19/2019 13  10 - 44 U/L Final    Anion Gap 09/19/2019 12  8 - 16 mmol/L Corrected    Corrected result; previously reported as 7 on 09/19/2019 at 10:40.    eGFR if African American 09/19/2019 >60.0  >60 mL/min/1.73 m^2 Final    eGFR if non African American 09/19/2019 >60.0  >60 mL/min/1.73 m^2 Final    Comment: Calculation used to obtain the estimated glomerular filtration  rate (eGFR) is the CKD-EPI equation.       Magnesium 09/19/2019 1.9  1.6 - 2.6 mg/dL Final    Phosphorus 09/19/2019 2.4* 2.7 - 4.5 mg/dL Final    Protein, Serum 09/19/2019 6.6  6.0 - 8.4 g/dL Final    Comment: Serum protein electrophoresis and immunofixation results should be   interpreted in clinical context in that some therapeutic agents can   result   in false positive results (example, daratumumab). Correlation with   the   patient s therapeutic regimen is required.      IgG - Serum 09/19/2019 984  650 - 1600 mg/dL Final    IgG Cord Blood Reference Range: 650-1600 mg/dL.    IgA 09/19/2019 62  40 - 350 mg/dL Final    IgA Cord Blood Reference Range: <5 mg/dL.    IgM 09/19/2019 23* 50 - 300 mg/dL Final    IgM Cord Blood Reference Range: <25 mg/dL.         Imaging:     EXAMINATION:  MRI ABDOMEN W WO CONTRAST    CLINICAL HISTORY:  Liver masses, probably benign on screening after primary tumor treated;  Hepatomegaly, not elsewhere classified    TECHNIQUE:  Multisequence multiplanar MRI examination of the abdomen obtained before and after the administration of intravenous contrast 10 mL  Gadavist.    COMPARISON:  MRI abdomen 05/31/2019.  CT abdomen and pelvis 04/10/2019.    FINDINGS:  Lung bases, heart, pericardium appear within normal limits.    Small hiatal hernia present.    Liver is upper normal in size.  Redemonstration of multiple serpiginous structures in the distribution of the portal and hepatic venous system most suggestive of portosystemic shunts.  These appear similar to prior exam.  No focal liver lesions are identified. Previously seen subcentimeter enhancing lesions within the liver are no longer visualized and presumably vascular in origin and related to shunting.    Gallbladder is normal.  No intra or extrahepatic biliary ductal dilatation.  Spleen is normal in size.  Stable enhancing structures in the superior aspect of the spleen, presumably meningioma.    Stomach, pancreas, and adrenal glands show no significant abnormalities.    Kidneys are normal in size and location.  No renal mass or hydronephrosis.  Visualized portions of the ureters demonstrate no significant abnormalities.    No bowel obstruction or inflammation.  No ascites or free air.  No lymphadenopathy in the abdomen.  Abdominal aorta is slightly tortuous.  Mild fusiform dilatation of the descending thoracic aorta, unchanged from prior.      Impression       Redemonstration of multiple serpiginous structures in the distribution of the portal and hepatic venous system most suggestive of portosystemic shunts.  No liver masses.  The appearance is similar to prior examination.  Nevertheless given nonspecific, continued follow-up suggested.    Stable small splenic enhancing lesion, likely splenic hemangioma.    Stable descending thoracic aorta aneurysm.    Small hiatal hernia.    Additional findings as above.    Electronically signed by resident: Mina Call  Date: 09/16/2019  Time: 09:07    Electronically signed by: Elton Taylor MD  Date: 09/16/2019  Time: 10:17           Assessment:       1. Multiple myeloma not  having achieved remission    2. Multiple myeloma, remission status unspecified    3. Metastatic multiple myeloma to bone           Plan:     Multiple Myeloma  - ECOG PS 1  - pt met most of Crab criteria in hospital - had anemia (hgb 7.1) corrected calcium of 10.6, renal function with Cr of 2.7 and T7 verterbral fracture  - Kappa was noted to be 548, with a k/l ratio of 693.7.  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  At  diagnosis, this translocation has an unfavorable prognosis in multiple myeloma (Correa et al., Blood [id number], 2003).  - 14:20 translocation will place this as R-ISS Stage III, high risk disease  - Bone marrow biopsy completed on 5/3 revealed a plasma cell population of 70-80% for this patient  -  PET-CT on 5/15 results as above with left scapula lesion SUV 3, L5 vertebral body lesion SUV 4.7, stable T7 compression fracture and minimally displaced left lateral 4th rib fracture; no activity in previously noted in splenic or hepatic regions   -SPEP reveals M-protein was noted to be 6.75 g/dL, NATALY reveals IgG kappa monoclonal protein    - Urine protein 24h quantification, 663 mg   - s/p four doses of dexamethasone 40 mg, w last dose on 5/9  - Plan for velcade 1.3 mg/m2 weekly on days 1,8.15.22 for 28 day cycles, also will plan to dose dexamethasone 40 mg weekly as well on days of velcade shots; currently revilimid at 10 mg/day  - Can dose revilmid to 25 mg a day; decrease dexamethasone to 20 mg a day  - went over risks of neuropathy, blood clots, VZV reactivation, and GI symptoms such as nausea, diarrhea and pt also asked to call us and/or come to ER for new fevers greater than 100.4F or new onset brusing bleeding. Pt agrees to proceed with therapy and has had  sign consent form due to limited dexterity with R hand  -  continue aspirin 81  mg daily; continue acyclovir 400 BID, discussed risks of shingles if this medication is not included in regimen  - Hgb of 10.2 today   - plan for zometa monthly, no need for dental eval as she has no current known jaw breakdown and is edentulous; can do 4 mg monthly  - C1 of VRD 5/23 - 6/19  - Currently on C5D8 of Velcade, as of 9/19  - Pending SPEP, NATALY, light chains,and quant immunoglobulins  - Pt met with transplant coordinator and is to be evaluated for transplant     Lower extremity edema, worse on L leg  - DVT has been ruled out, better during this visit  -continue dexmethasone to 20 mg  - continue compression stokcings     Nausea/ vomiting  - controlled on zofran at first sign of nausea  - will add secondary medications for additional control too if this becomes worse or uncontrolled      Malnutrition/loss of appetite  - discussed with SW about arranging pt with boost/ensure      Anemia  - low ferritin in years past 4-8, currently is 54 on 4/30/19 while on iron supplementation  - prior GI eval in 2015 and 16  - will continue to monitor for worsening anemia and signs sx of bleeding     FUENTES  - 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 Tineo      F/u:   Increase dose of revilimid to 25 mg  PET-CT and bone marrow biopsy  in OR for this patient during first week of October; Dr Tineo ok'ed her to continue aspirin, as she has had multiple prior strokes  RTC on 10/10 to review bone marrow and PET-CT results  Check a CBC, CMP, Phos, SPEP, NATALY, free light chains, and quant immunoglobulins on morning of 10/10  Velcade scheduled until 10/17 and needs zometa on 10/17  Additional discussions and scheduling after next visit per BMT team         Genny Napoles MD  Hematology and Oncology PGY V  [facility]

## 2019-09-20 DIAGNOSIS — C90.00 MULTIPLE MYELOMA, REMISSION STATUS UNSPECIFIED: ICD-10-CM

## 2019-09-20 RX ORDER — LENALIDOMIDE 25 MG/1
25 CAPSULE ORAL DAILY
Qty: 21 CAPSULE | Refills: 0 | Status: SHIPPED | OUTPATIENT
Start: 2019-09-20 | End: 2019-10-10 | Stop reason: SDUPTHER

## 2019-09-20 NOTE — TELEPHONE ENCOUNTER
----- Message from Izabella Sotomayor MA sent at 9/20/2019  2:19 PM CDT -----      Gilma SANCHEZ Staff   Caller: Unspecified (Today,  4:10 PM)           Type:  Pharmacy Calling to Clarify an RX     Name of Caller:Ashleigh   Pharmacy Name:[facility]   Prescription Name:REVLIMID 25 mg Cap   What do they need to clarify?:Needs auth   Best Call Back Number:[phone]   Additional Information:   Thank You   VEDA Gan

## 2019-09-24 ENCOUNTER — CONFERENCE (OUTPATIENT)
Dept: TRANSPLANT | Facility: CLINIC | Age: 69
End: 2019-09-24

## 2019-09-24 NOTE — TELEPHONE ENCOUNTER
Patient: Shelby Thompson       MRN: [id number]      : 1950     Age: 69 y.o.  [address] [facility] [address]    Provider: Hepatologist - Chris (previously followed by Digna Hanson NP)    Urgency of review: non-urgent    Patient Transplant Status: Hepatology / Non-transplant    Reason for presentation: Reassessment    Clinical Summary: 69 year old lady with no hx of liver disease, recently diagnosed with metastatic multiple myeloma, was being treated with lenlidomide and bortezomib. She was previously found to have multiple liver lesions on u/s, TPCT, and MRI. She also had an enhancing retroperitoneal soft tissue lesion, likely an enlarged retroperitoneal lymph node, concerning for metastasis per TPCT. Her PET scan on 5/15/19 did not show any activity in the liver.     AFP, CEA, CA 19-9, and  all normal.    Reviewed at IR conference  with recommendation for 3 month imaging surveillance. MRI done 19 with likely portosystemic shunts but no liver masses noted.    Imaging to be reviewed: MRI 19    HCC Treatment History: NA     ABO: O POS    Platelets:   Lab Results   Component Value Date/Time     2019 09:16 AM     Creatinine:   Lab Results   Component Value Date/Time    CREATININE 0.9 2019 09:16 AM     Bilirubin:   Lab Results   Component Value Date/Time    BILITOT 0.4 2019 09:16 AM     AFP Last 3 each if available:   Lab Results   Component Value Date/Time    AFP 1.5 2019 03:42 AM    AFP 2.0 2015 09:15 AM       MELD: MELD-Na score: 19 at 2019  5:42 AM  MELD score: 18 at 2019  5:42 AM  Calculated from:  Serum Creatinine: 2.7 mg/dL at 2019  5:42 AM  Serum Sodium: 135 mmol/L at 2019  5:42 AM  Total Bilirubin: 0.3 mg/dL (Rounded to 1 mg/dL) at 2019  5:42 AM  INR(ratio): 1.2 at 2019 10:25 AM  Age: 69 years    Discussion:   Enhancing lesion not significantly changed and likely benign shunts. Stable from at least 16     Plan  MRI  in 6 months       Follow-up Provider: Dr Perez

## 2019-09-24 NOTE — TELEPHONE ENCOUNTER
Please inform patient that recent MRI did not show any concerning liver masses. The previously seen spots were likely dilated blood vessels - shunts. Nothing concerning. Recommend repeat MRI in 6 months.

## 2019-09-26 ENCOUNTER — INFUSION (OUTPATIENT)
Dept: INFUSION THERAPY | Facility: HOSPITAL | Age: 69
End: 2019-09-26
Attending: INTERNAL MEDICINE
Payer: MEDICARE

## 2019-09-26 ENCOUNTER — TELEPHONE (OUTPATIENT)
Dept: HEPATOLOGY | Facility: CLINIC | Age: 69
End: 2019-09-26

## 2019-09-26 DIAGNOSIS — C90.00 MULTIPLE MYELOMA NOT HAVING ACHIEVED REMISSION: Primary | ICD-10-CM

## 2019-09-26 DIAGNOSIS — R16.0 LIVER MASSES: ICD-10-CM

## 2019-09-26 DIAGNOSIS — R16.0 LIVER MASS: Primary | ICD-10-CM

## 2019-09-26 PROCEDURE — 63600175 PHARM REV CODE 636 W HCPCS: Mod: JG,HCNC | Performed by: INTERNAL MEDICINE

## 2019-09-26 PROCEDURE — 96401 CHEMO ANTI-NEOPL SQ/IM: CPT | Mod: HCNC

## 2019-09-26 RX ORDER — BORTEZOMIB 3.5 MG/1
1.3 INJECTION, POWDER, LYOPHILIZED, FOR SOLUTION INTRAVENOUS; SUBCUTANEOUS
Status: COMPLETED | OUTPATIENT
Start: 2019-09-26 | End: 2019-09-26

## 2019-09-26 RX ADMIN — BORTEZOMIB 2.4 MG: 3.5 INJECTION, POWDER, LYOPHILIZED, FOR SOLUTION INTRAVENOUS; SUBCUTANEOUS at 11:09

## 2019-09-26 NOTE — TELEPHONE ENCOUNTER
I called patient and told her that her recent MRI did not show any concerning liver masses. The previously seen spots were likely dilated blood vessels - shunts. Nothing concerning. Recommendion is to repeat MRI in 6 months.    Patient verbalized understanding.     Appointment made and mailed to patient.

## 2019-09-30 ENCOUNTER — OFFICE VISIT (OUTPATIENT)
Dept: OBSTETRICS AND GYNECOLOGY | Facility: CLINIC | Age: 69
End: 2019-09-30
Payer: MEDICARE

## 2019-09-30 ENCOUNTER — HOSPITAL ENCOUNTER (OUTPATIENT)
Dept: RADIOLOGY | Facility: HOSPITAL | Age: 69
Discharge: HOME | End: 2019-09-30
Attending: STUDENT IN AN ORGANIZED HEALTH CARE EDUCATION/TRAINING PROGRAM
Payer: MEDICARE

## 2019-09-30 VITALS
WEIGHT: 164.88 LBS | HEIGHT: 63 IN | BODY MASS INDEX: 29.21 KG/M2 | SYSTOLIC BLOOD PRESSURE: 132 MMHG | DIASTOLIC BLOOD PRESSURE: 84 MMHG

## 2019-09-30 DIAGNOSIS — Z01.419 WELL WOMAN EXAM WITH ROUTINE GYNECOLOGICAL EXAM: Primary | ICD-10-CM

## 2019-09-30 DIAGNOSIS — C90.00 MULTIPLE MYELOMA NOT HAVING ACHIEVED REMISSION: ICD-10-CM

## 2019-09-30 PROCEDURE — 99999 PR PBB SHADOW E&M-EST. PATIENT-LVL III: ICD-10-PCS | Mod: PBBFAC,HCNC,, | Performed by: OBSTETRICS & GYNECOLOGY

## 2019-09-30 PROCEDURE — 78816 PET IMAGE W/CT FULL BODY: CPT | Mod: TC,HCNC

## 2019-09-30 PROCEDURE — 78816 NM PET CT WHOLE BODY: ICD-10-PCS | Mod: 26,HCNC,PS, | Performed by: RADIOLOGY

## 2019-09-30 PROCEDURE — 99999 PR PBB SHADOW E&M-EST. PATIENT-LVL III: CPT | Mod: PBBFAC,HCNC,, | Performed by: OBSTETRICS & GYNECOLOGY

## 2019-09-30 PROCEDURE — 78816 PET IMAGE W/CT FULL BODY: CPT | Mod: 26,HCNC,PS, | Performed by: RADIOLOGY

## 2019-09-30 PROCEDURE — G0101 CA SCREEN;PELVIC/BREAST EXAM: HCPCS | Mod: HCNC,S$GLB,, | Performed by: OBSTETRICS & GYNECOLOGY

## 2019-09-30 PROCEDURE — G0101 PR CA SCREEN;PELVIC/BREAST EXAM: ICD-10-PCS | Mod: HCNC,S$GLB,, | Performed by: OBSTETRICS & GYNECOLOGY

## 2019-09-30 NOTE — PROGRESS NOTES
History & Physical  Gynecology      SUBJECTIVE:     Chief Complaint: Annual Exam       History of Present Illness:  Annual Exam-Postmenopausal  Patient presents for annual exam. The patient has no complaints today. Patient denies post-menopausal vaginal bleeding. The patient is not sexually active. The patient is not taking hormone replacement therapy.  The patient participates in regular exercise: yes.  She does not smoke.     GYN screening history: last pap: approximate date 2013 and was normal, no longer needed  Mammogram history: 9/2019  Colonoscopy history: 2015, repeat 10 years  Dexa history: 2017, repeat 4 years      Review of patient's allergies indicates:   Allergen Reactions    Lipitor [atorvastatin]      Itching, elevated cpk, muscle aches, statin induced myositis       Past Medical History:   Diagnosis Date    Acute respiratory failure with hypoxia 4/30/2019    FUENTES (acute kidney injury) 5/1/2019    Allergy     Anemia     Aortic aneurysm     Breast cancer 10/2011    left breast Stage 0 DCIS    Chronic diastolic congestive heart failure 11/6/2015    Colon polyp     GERD (gastroesophageal reflux disease)     History of colonic polyps     HX: breast cancer     Hyperlipemia     Hypertension     ICH (intracerebral hemorrhage)     Major depressive disorder, single episode, mild 6/23/2016    Nuclear sclerosis 7/21/2014    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 7/21/2014    PAD (peripheral artery disease) 11/6/2015    Statin-induced rhabdomyolysis     4/2015     Stroke 4/2011     Past Surgical History:   Procedure Laterality Date    APPENDECTOMY      BREAST BIOPSY Left 10/2011    BREAST LUMPECTOMY Left 2011    DCIS    COLONOSCOPY      CYST REMOVAL      back    ESOPHAGOGASTRODUODENOSCOPY  07/2016    duodenal angioectasia    FOOT FRACTURE SURGERY  10/2012    right foot, with R hallux valgus repair    FRACTURE SURGERY Right     broken toe repiar     HEMORRHOID SURGERY      LIVER BIOPSY  2015    essentially normal, no fibrosis    TUBAL LIGATION      UPPER GASTROINTESTINAL ENDOSCOPY       OB History        4    Para   4    Term   2            AB        Living   2       SAB        TAB        Ectopic        Multiple        Live Births   2               Family History   Problem Relation Age of Onset    No Known Problems Sister     Cancer Sister 63        Lung Cancer    No Known Problems Mother     Cancer Father     No Known Problems Brother     Hypertension Daughter     Fibroids Daughter         uterine    Hypertension Son     Breast cancer Sister 62    No Known Problems Brother     No Known Problems Maternal Aunt     No Known Problems Maternal Uncle     No Known Problems Paternal Aunt     No Known Problems Paternal Uncle     Hypertension Maternal Grandmother     No Known Problems Maternal Grandfather     No Known Problems Paternal Grandmother     No Known Problems Paternal Grandfather     Ovarian cancer Neg Hx     Colon cancer Neg Hx     Tremor Neg Hx     Amblyopia Neg Hx     Blindness Neg Hx     Cataracts Neg Hx     Diabetes Neg Hx     Glaucoma Neg Hx     Macular degeneration Neg Hx     Retinal detachment Neg Hx     Strabismus Neg Hx     Stroke Neg Hx     Thyroid disease Neg Hx     Esophageal cancer Neg Hx     Rectal cancer Neg Hx     Stomach cancer Neg Hx     Ulcerative colitis Neg Hx     Crohn's disease Neg Hx     Irritable bowel syndrome Neg Hx     Celiac disease Neg Hx      Social History     Tobacco Use    Smoking status: Former Smoker     Packs/day: 0.50     Years: 20.00     Pack years: 10.00     Types: Cigarettes     Last attempt to quit: 2011     Years since quittin.5    Smokeless tobacco: Former User     Quit date: 4/3/2011   Substance Use Topics    Alcohol use: Yes     Comment: on occasion - one glass wine every 3 months    Drug use: No       Current Outpatient Medications   Medication  Sig    acetaminophen (TYLENOL) 650 MG TbSR Take 1,300 mg by mouth daily as needed (pain).    acyclovir (ZOVIRAX) 400 MG tablet Take 1 tablet (400 mg total) by mouth 2 (two) times daily.    alirocumab (PRALUENT PEN) 75 mg/mL PnIj Inject 1 mL (75 mg total) into the skin every 14 (fourteen) days. Ask your PCP/ Oncologist    allopurinol (ZYLOPRIM) 300 MG tablet Take 1 tablet (300 mg total) by mouth once daily.    amLODIPine (NORVASC) 5 MG tablet TAKE 1 TABLET BY MOUTH EVERY DAY    aspirin 81 mg Tab Take 1 tablet by mouth Daily.    bisacodyl (DULCOLAX) 5 mg EC tablet Take 5 mg by mouth once daily.    dexAMETHasone (DECADRON) 4 MG Tab Take 5 tablets (20 mg total) by mouth once a week. Take 5 tabs or 20 mg weekly, all tabs on same day as weekly Velcade injection    ferrous gluconate 324 mg (37.5 mg iron) Tab TAKE 1 TABLET BY MOUTH 2 (TWO) TIMES DAILY WITH MEALS.    gabapentin (NEURONTIN) 300 MG capsule TAKE 1 CAPSULE (300 MG TOTAL) BY MOUTH 2 (TWO) TIMES DAILY.    omeprazole (PRILOSEC) 40 MG capsule TAKE 1 CAPSULE (40 MG TOTAL) BY MOUTH ONCE DAILY.    ondansetron (ZOFRAN) 4 MG tablet Take 1 tablet (4 mg total) by mouth every 6 (six) hours as needed for Nausea.    ondansetron (ZOFRAN-ODT) 8 MG TbDL Take 1 tablet (8 mg total) by mouth every 8 (eight) hours as needed (nausea/vomiting).    REVLIMID (REVLIMID) 25 mg Cap Take 1 capsule (25 mg total) by mouth once daily auth [id number] 9/20/19.    sertraline (ZOLOFT) 50 MG tablet TAKE 1 TABLET (50 MG TOTAL) BY MOUTH ONCE DAILY.    traMADol (ULTRAM) 50 mg tablet Take 1 tablet (50 mg total) by mouth 2 (two) times daily as needed for Pain.    vitamin D 1000 units Tab Take 185 mg by mouth once daily.     Current Facility-Administered Medications   Medication    zoledronic acid (ZOMETA) 4 mg in sodium chloride 0.9% 100 mL IVPB       Review of Systems:  Review of Systems   Constitutional: Negative for appetite change, fever and unexpected weight change.   Respiratory:  Negative for shortness of breath.    Cardiovascular: Negative for chest pain.   Gastrointestinal: Negative for nausea and vomiting.   Genitourinary: Negative for dyspareunia, frequency, genital sores, pelvic pain, urgency, vaginal bleeding, vaginal discharge, vaginal pain, urinary incontinence, postcoital bleeding, postmenopausal bleeding and vaginal odor.        OBJECTIVE:     Physical Exam:  Physical Exam   Constitutional: She is oriented to person, place, and time. She appears well-developed and well-nourished.   Neck: Normal range of motion. Neck supple. No tracheal deviation present. No thyromegaly present.   Cardiovascular: Normal rate, regular rhythm and normal heart sounds.   Pulmonary/Chest: Effort normal and breath sounds normal. Right breast exhibits no inverted nipple, no mass, no nipple discharge, no skin change and no tenderness. Left breast exhibits no inverted nipple, no mass, no nipple discharge, no skin change and no tenderness. Breasts are symmetrical.   Abdominal: Soft.   Genitourinary: Vagina normal and uterus normal. No labial fusion. There is no rash, tenderness, lesion or injury on the right labia. There is no rash, tenderness, lesion or injury on the left labia. Uterus is not deviated, not enlarged, not fixed and not tender. Cervix exhibits no motion tenderness, no discharge and no friability. Right adnexum displays no mass, no tenderness and no fullness. Left adnexum displays no mass, no tenderness and no fullness. No erythema, tenderness or bleeding in the vagina. No foreign body in the vagina. No signs of injury around the vagina. No vaginal discharge found.   Genitourinary Comments: Urethra: normal appearing urethra with no masses, tenderness or lesions  Urethral meatus: normal size, anterior vaginal wall with no prolapse, no lesions   Neurological: She is alert and oriented to person, place, and time.   Psychiatric: She has a normal mood and affect. Her behavior is normal. Judgment and  thought content normal.   Nursing note and vitals reviewed.      Chaperoned by: Pj    ASSESSMENT:       ICD-10-CM ICD-9-CM    1. Well woman exam with routine gynecological exam Z01.419 V72.31           Plan:      Shelby was seen today for annual exam.    Diagnoses and all orders for this visit:    Well woman exam with routine gynecological exam        No orders of the defined types were placed in this encounter.      Well Woman:   - Pap smear: no longer needed, no history of abnormal pap smears  - Mammogram: up to date  - Colonoscopy: up to date  - Dexa: due 2 years  - Immunizations: with pcp  - Labs: with pcp  - Exercise counseling provided      Follow up in one year for annual, or prn.    Jody Teran

## 2019-10-01 ENCOUNTER — TELEPHONE (OUTPATIENT)
Dept: HEMATOLOGY/ONCOLOGY | Facility: CLINIC | Age: 69
End: 2019-10-01

## 2019-10-01 LAB — POCT GLUCOSE: 91 MG/DL (ref 70–110)

## 2019-10-01 NOTE — TELEPHONE ENCOUNTER
Spoke with pt, Dr. luong and dr. Napoles.   Dr. LEBLANC said we should move forward with velcade and get it after bmbx. Informed pt that we will call tomorrow with time to arrive for bmbx. She can go get velcade shot after.

## 2019-10-01 NOTE — TELEPHONE ENCOUNTER
----- Message from Becky Maldonado sent at 10/1/2019  1:02 PM CDT -----  Contact: Pt      ----- Message -----  From: Lennie Multani  Sent: 10/1/2019  12:09 PM CDT  To: Dony Royal Staff    Pt called to speak to the nurse regarding her upcoming appts this week and needs a call back today at [phone]

## 2019-10-01 NOTE — TELEPHONE ENCOUNTER
Returned patients call she was calling to see what time the BM BX in the OR was. She spoke to someone earlier about it.      ----- Message from Becky Maldonado sent at 10/1/2019  1:02 PM CDT -----  Contact: Pt      ----- Message -----  From: Lennie Multani  Sent: 10/1/2019  12:09 PM CDT  To: Dony Royal Staff    Pt called to speak to the nurse regarding her upcoming appts this week and needs a call back today at [phone]

## 2019-10-03 ENCOUNTER — ANESTHESIA EVENT (OUTPATIENT)
Dept: SURGERY | Facility: HOSPITAL | Age: 69
End: 2019-10-03
Payer: MEDICARE

## 2019-10-03 ENCOUNTER — ANESTHESIA (OUTPATIENT)
Dept: SURGERY | Facility: HOSPITAL | Age: 69
End: 2019-10-03
Payer: MEDICARE

## 2019-10-03 ENCOUNTER — INFUSION (OUTPATIENT)
Dept: INFUSION THERAPY | Facility: HOSPITAL | Age: 69
End: 2019-10-03
Attending: INTERNAL MEDICINE
Payer: MEDICARE

## 2019-10-03 ENCOUNTER — HOSPITAL ENCOUNTER (OUTPATIENT)
Facility: HOSPITAL | Age: 69
Discharge: HOME | End: 2019-10-03
Attending: INTERNAL MEDICINE | Admitting: INTERNAL MEDICINE
Payer: MEDICARE

## 2019-10-03 VITALS
RESPIRATION RATE: 18 BRPM | TEMPERATURE: 98 F | BODY MASS INDEX: 29.06 KG/M2 | WEIGHT: 164 LBS | HEIGHT: 63 IN | SYSTOLIC BLOOD PRESSURE: 141 MMHG | OXYGEN SATURATION: 97 % | HEART RATE: 60 BPM | DIASTOLIC BLOOD PRESSURE: 79 MMHG

## 2019-10-03 DIAGNOSIS — C90.00 MULTIPLE MYELOMA NOT HAVING ACHIEVED REMISSION: Primary | ICD-10-CM

## 2019-10-03 DIAGNOSIS — C90.00 MULTIPLE MYELOMA: ICD-10-CM

## 2019-10-03 PROCEDURE — 96401 CHEMO ANTI-NEOPL SQ/IM: CPT

## 2019-10-03 PROCEDURE — 63600175 PHARM REV CODE 636 W HCPCS: Mod: HCNC | Performed by: NURSE ANESTHETIST, CERTIFIED REGISTERED

## 2019-10-03 PROCEDURE — 88364 TISSUE SPECIMEN TO PATHOLOGY, BONE MARROW ASPIRATION/BIOPSY PROCEDURE: ICD-10-PCS | Mod: 26,HCNC,, | Performed by: PATHOLOGY

## 2019-10-03 PROCEDURE — 25000003 PHARM REV CODE 250: Mod: HCNC | Performed by: INTERNAL MEDICINE

## 2019-10-03 PROCEDURE — 88237 TISSUE CULTURE BONE MARROW: CPT | Mod: HCNC

## 2019-10-03 PROCEDURE — 88313 TISSUE SPECIMEN TO PATHOLOGY, BONE MARROW ASPIRATION/BIOPSY PROCEDURE: ICD-10-PCS | Mod: 26,HCNC,, | Performed by: PATHOLOGY

## 2019-10-03 PROCEDURE — 88184 FLOWCYTOMETRY/ TC 1 MARKER: CPT | Mod: HCNC | Performed by: PATHOLOGY

## 2019-10-03 PROCEDURE — 63600175 PHARM REV CODE 636 W HCPCS: Mod: HCNC | Performed by: ANESTHESIOLOGY

## 2019-10-03 PROCEDURE — 88313 SPECIAL STAINS GROUP 2: CPT | Mod: HCNC | Performed by: PATHOLOGY

## 2019-10-03 PROCEDURE — 38222 DX BONE MARROW BX & ASPIR: CPT | Mod: HCNC,LT,, | Performed by: NURSE PRACTITIONER

## 2019-10-03 PROCEDURE — 88313 SPECIAL STAINS GROUP 2: CPT | Mod: 26,HCNC,, | Performed by: PATHOLOGY

## 2019-10-03 PROCEDURE — 85097 BONE MARROW INTERPRETATION: CPT | Mod: HCNC,,, | Performed by: PATHOLOGY

## 2019-10-03 PROCEDURE — 88311 TISSUE SPECIMEN TO PATHOLOGY, BONE MARROW ASPIRATION/BIOPSY PROCEDURE: ICD-10-PCS | Mod: 26,HCNC,, | Performed by: PATHOLOGY

## 2019-10-03 PROCEDURE — 37000009 HC ANESTHESIA EA ADD 15 MINS: Mod: HCNC | Performed by: INTERNAL MEDICINE

## 2019-10-03 PROCEDURE — 88185 FLOWCYTOMETRY/TC ADD-ON: CPT | Mod: 59,HCNC | Performed by: PATHOLOGY

## 2019-10-03 PROCEDURE — D9220A PRA ANESTHESIA: Mod: HCNC,,, | Performed by: ANESTHESIOLOGY

## 2019-10-03 PROCEDURE — 96402 CHEMO HORMON ANTINEOPL SQ/IM: CPT | Mod: HCNC

## 2019-10-03 PROCEDURE — 85097 TISSUE SPECIMEN TO PATHOLOGY, BONE MARROW ASPIRATION/BIOPSY PROCEDURE: ICD-10-PCS | Mod: HCNC,,, | Performed by: PATHOLOGY

## 2019-10-03 PROCEDURE — 36000705 HC OR TIME LEV I EA ADD 15 MIN: Mod: HCNC | Performed by: INTERNAL MEDICINE

## 2019-10-03 PROCEDURE — 36000704 HC OR TIME LEV I 1ST 15 MIN: Mod: HCNC | Performed by: INTERNAL MEDICINE

## 2019-10-03 PROCEDURE — 71000015 HC POSTOP RECOV 1ST HR: Mod: HCNC | Performed by: INTERNAL MEDICINE

## 2019-10-03 PROCEDURE — 88342 TISSUE SPECIMEN TO PATHOLOGY, BONE MARROW ASPIRATION/BIOPSY PROCEDURE: ICD-10-PCS | Mod: 26,HCNC,59, | Performed by: PATHOLOGY

## 2019-10-03 PROCEDURE — 88311 DECALCIFY TISSUE: CPT | Mod: 26,HCNC,, | Performed by: PATHOLOGY

## 2019-10-03 PROCEDURE — 88365 TISSUE SPECIMEN TO PATHOLOGY, BONE MARROW ASPIRATION/BIOPSY PROCEDURE: ICD-10-PCS | Mod: 26,HCNC,, | Performed by: PATHOLOGY

## 2019-10-03 PROCEDURE — 88342 IMHCHEM/IMCYTCHM 1ST ANTB: CPT | Mod: 26,HCNC,59, | Performed by: PATHOLOGY

## 2019-10-03 PROCEDURE — 71000044 HC DOSC ROUTINE RECOVERY FIRST HOUR: Mod: HCNC | Performed by: INTERNAL MEDICINE

## 2019-10-03 PROCEDURE — 88364 INSITU HYBRIDIZATION (FISH): CPT | Mod: 26,HCNC,, | Performed by: PATHOLOGY

## 2019-10-03 PROCEDURE — 88305 TISSUE SPECIMEN TO PATHOLOGY, BONE MARROW ASPIRATION/BIOPSY PROCEDURE: ICD-10-PCS | Mod: 26,HCNC,, | Performed by: PATHOLOGY

## 2019-10-03 PROCEDURE — 88189 PR  FLOWCYTOMETRY/READ, 16 & > MARKERS: ICD-10-PCS | Mod: HCNC,,, | Performed by: PATHOLOGY

## 2019-10-03 PROCEDURE — 88365 INSITU HYBRIDIZATION (FISH): CPT | Mod: 26,HCNC,, | Performed by: PATHOLOGY

## 2019-10-03 PROCEDURE — 88305 TISSUE EXAM BY PATHOLOGIST: CPT | Mod: 26,HCNC,, | Performed by: PATHOLOGY

## 2019-10-03 PROCEDURE — 88313 SPECIAL STAINS GROUP 2: CPT | Mod: HCNC

## 2019-10-03 PROCEDURE — 88189 FLOWCYTOMETRY/READ 16 & >: CPT | Mod: HCNC,,, | Performed by: PATHOLOGY

## 2019-10-03 PROCEDURE — 88271 CYTOGENETICS DNA PROBE: CPT | Mod: HCNC

## 2019-10-03 PROCEDURE — 37000008 HC ANESTHESIA 1ST 15 MINUTES: Mod: HCNC | Performed by: INTERNAL MEDICINE

## 2019-10-03 PROCEDURE — 88264 CHROMOSOME ANALYSIS 20-25: CPT | Mod: HCNC

## 2019-10-03 PROCEDURE — 63600175 PHARM REV CODE 636 W HCPCS: Mod: JG,HCNC | Performed by: INTERNAL MEDICINE

## 2019-10-03 PROCEDURE — 25000003 PHARM REV CODE 250: Mod: HCNC | Performed by: ANESTHESIOLOGY

## 2019-10-03 PROCEDURE — D9220A PRA ANESTHESIA: ICD-10-PCS | Mod: HCNC,,, | Performed by: ANESTHESIOLOGY

## 2019-10-03 PROCEDURE — 38222 PR BONE MARROW BIOPSY(IES) W/ASPIRATION(S); DIAGNOSTIC: ICD-10-PCS | Mod: HCNC,LT,, | Performed by: NURSE PRACTITIONER

## 2019-10-03 RX ORDER — FENTANYL CITRATE 50 UG/ML
25 INJECTION, SOLUTION INTRAMUSCULAR; INTRAVENOUS EVERY 5 MIN PRN
Status: DISCONTINUED | OUTPATIENT
Start: 2019-10-03 | End: 2019-10-03 | Stop reason: HOSPADM

## 2019-10-03 RX ORDER — HEPARIN 100 UNIT/ML
500 SYRINGE INTRAVENOUS
Status: CANCELLED | OUTPATIENT
Start: 2019-10-24

## 2019-10-03 RX ORDER — BORTEZOMIB 3.5 MG/1
1.3 INJECTION, POWDER, LYOPHILIZED, FOR SOLUTION INTRAVENOUS; SUBCUTANEOUS
Status: CANCELLED | OUTPATIENT
Start: 2019-10-24

## 2019-10-03 RX ORDER — PROPOFOL 10 MG/ML
VIAL (ML) INTRAVENOUS CONTINUOUS PRN
Status: DISCONTINUED | OUTPATIENT
Start: 2019-10-03 | End: 2019-10-03

## 2019-10-03 RX ORDER — SODIUM CHLORIDE 0.9 % (FLUSH) 0.9 %
10 SYRINGE (ML) INJECTION
Status: CANCELLED | OUTPATIENT
Start: 2019-10-17

## 2019-10-03 RX ORDER — HEPARIN 100 UNIT/ML
500 SYRINGE INTRAVENOUS
Status: CANCELLED | OUTPATIENT
Start: 2019-10-03

## 2019-10-03 RX ORDER — BORTEZOMIB 3.5 MG/1
1.3 INJECTION, POWDER, LYOPHILIZED, FOR SOLUTION INTRAVENOUS; SUBCUTANEOUS
Status: CANCELLED | OUTPATIENT
Start: 2019-10-17

## 2019-10-03 RX ORDER — LIDOCAINE HCL/PF 100 MG/5ML
SYRINGE (ML) INTRAVENOUS
Status: DISCONTINUED | OUTPATIENT
Start: 2019-10-03 | End: 2019-10-03

## 2019-10-03 RX ORDER — SODIUM CHLORIDE 0.9 % (FLUSH) 0.9 %
10 SYRINGE (ML) INJECTION
Status: CANCELLED | OUTPATIENT
Start: 2019-10-10

## 2019-10-03 RX ORDER — ONDANSETRON 2 MG/ML
4 INJECTION INTRAMUSCULAR; INTRAVENOUS ONCE AS NEEDED
Status: DISCONTINUED | OUTPATIENT
Start: 2019-10-03 | End: 2019-10-03 | Stop reason: HOSPADM

## 2019-10-03 RX ORDER — SODIUM CHLORIDE 0.9 % (FLUSH) 0.9 %
10 SYRINGE (ML) INJECTION
Status: CANCELLED | OUTPATIENT
Start: 2019-10-24

## 2019-10-03 RX ORDER — LIDOCAINE HYDROCHLORIDE 10 MG/ML
INJECTION, SOLUTION EPIDURAL; INFILTRATION; INTRACAUDAL; PERINEURAL
Status: DISCONTINUED | OUTPATIENT
Start: 2019-10-03 | End: 2019-10-03 | Stop reason: HOSPADM

## 2019-10-03 RX ORDER — HEPARIN 100 UNIT/ML
500 SYRINGE INTRAVENOUS
Status: CANCELLED | OUTPATIENT
Start: 2019-10-17

## 2019-10-03 RX ORDER — HEPARIN 100 UNIT/ML
500 SYRINGE INTRAVENOUS
Status: CANCELLED | OUTPATIENT
Start: 2019-10-10

## 2019-10-03 RX ORDER — LIDOCAINE HYDROCHLORIDE 10 MG/ML
1 INJECTION, SOLUTION EPIDURAL; INFILTRATION; INTRACAUDAL; PERINEURAL ONCE
Status: COMPLETED | OUTPATIENT
Start: 2019-10-03 | End: 2019-10-03

## 2019-10-03 RX ORDER — BORTEZOMIB 3.5 MG/1
1.3 INJECTION, POWDER, LYOPHILIZED, FOR SOLUTION INTRAVENOUS; SUBCUTANEOUS
Status: COMPLETED | OUTPATIENT
Start: 2019-10-03 | End: 2019-10-03

## 2019-10-03 RX ORDER — SODIUM CHLORIDE 0.9 % (FLUSH) 0.9 %
10 SYRINGE (ML) INJECTION
Status: CANCELLED | OUTPATIENT
Start: 2019-10-03

## 2019-10-03 RX ORDER — BORTEZOMIB 3.5 MG/1
1.3 INJECTION, POWDER, LYOPHILIZED, FOR SOLUTION INTRAVENOUS; SUBCUTANEOUS
Status: CANCELLED | OUTPATIENT
Start: 2019-10-10

## 2019-10-03 RX ORDER — PROPOFOL 10 MG/ML
VIAL (ML) INTRAVENOUS
Status: DISCONTINUED | OUTPATIENT
Start: 2019-10-03 | End: 2019-10-03

## 2019-10-03 RX ORDER — SODIUM CHLORIDE 9 MG/ML
INJECTION, SOLUTION INTRAVENOUS CONTINUOUS
Status: DISCONTINUED | OUTPATIENT
Start: 2019-10-03 | End: 2019-10-03 | Stop reason: HOSPADM

## 2019-10-03 RX ORDER — BORTEZOMIB 3.5 MG/1
1.3 INJECTION, POWDER, LYOPHILIZED, FOR SOLUTION INTRAVENOUS; SUBCUTANEOUS
Status: CANCELLED | OUTPATIENT
Start: 2019-10-03

## 2019-10-03 RX ADMIN — BORTEZOMIB 2.4 MG: 3.5 INJECTION, POWDER, LYOPHILIZED, FOR SOLUTION INTRAVENOUS; SUBCUTANEOUS at 02:10

## 2019-10-03 RX ADMIN — PROPOFOL 150 MCG/KG/MIN: 10 INJECTION, EMULSION INTRAVENOUS at 11:10

## 2019-10-03 RX ADMIN — PROPOFOL 40 MG: 10 INJECTION, EMULSION INTRAVENOUS at 11:10

## 2019-10-03 RX ADMIN — LIDOCAINE HYDROCHLORIDE 40 MG: 20 INJECTION, SOLUTION INTRAVENOUS at 11:10

## 2019-10-03 RX ADMIN — SODIUM CHLORIDE: 0.9 INJECTION, SOLUTION INTRAVENOUS at 11:10

## 2019-10-03 RX ADMIN — LIDOCAINE HYDROCHLORIDE 2 MG: 10 INJECTION, SOLUTION EPIDURAL; INFILTRATION; INTRACAUDAL; PERINEURAL at 11:10

## 2019-10-03 NOTE — DISCHARGE INSTRUCTIONS
Discharge instructions for having a Bone Marrow Aspiration / Biopsy    Keep Bandage in place for 24 hours.  - Do not shower or take a tube bath during this time. (you may sponge bathe).  - Call the nurse or physician for excessive bleeding or pain at biopsy site.  - You may take Tylenol as needed for pain.    You have received medication to sedate you.  - Do not drive a car or operate heavy machinery for the rest of the day.  - You may resume other activities as tolerated.    You can call [phone] for any problems during the hours of 8:00 AM-5:00PM.    For an emergency after 5:00 PM you can call [phone] and have the  page the Hematologist / Oncologist on call.

## 2019-10-03 NOTE — PLAN OF CARE
Discharge instructions reviewed with patient and friend, verbalization of understanding.  Pt denies pain. Tolerated water and Coke; without any vomiting or complaints of nausea . VSS. Pt ready for discharge home.

## 2019-10-03 NOTE — DISCHARGE SUMMARY
[facility]  Hematology  Bone Marrow Transplant  Discharge Summary      Patient Name: Shelby Thompson  MRN: [id number]  Admission Date: 10/3/2019  Hospital Length of Stay: 0 days  Discharge Date and Time: 10/3/2019 at 12:09 pm  Attending Physician: Jere Tineo MD   Discharging Provider: Kathleen Kimball NP  Primary Care Provider: Emanuel Arevalo MD    HPI:  Restaging of multiple myeloma    Procedure(s) (LRB):  Biopsy-bone marrow (Left)     Hospital Course: Patient admitted to pre op today for a bone marrow aspiration and biopsy. Consent was done for a bone marrow biopsy. Patient was sedated per anesthesia and a bone marrow biopsy and aspiration was performed in the OR (see Op note). Patient was then transferred to post op and discharged home when appropriate per anesthesia.       Pending Diagnostic Studies:     Procedure Component Value Units Date/Time    Bone Marrow Prep and Stain [[id number]] Collected:  10/03/19 1125    Order Status:  Sent Lab Status:  In process Updated:  10/03/19 1126    Specimen:  Bone Marrow     Chromosome Analysis, Bone Marrow [[id number]] Collected:  10/03/19 1126    Order Status:  Sent Lab Status:  In process Updated:  10/03/19 1126    Specimen:  Bone Marrow     Iron Stain, Bone Marrow [[id number]] Collected:  10/03/19 1125    Order Status:  Sent Lab Status:  In process Updated:  10/03/19 1126    Specimen:  Bone Marrow     Leukemia/Lymphoma Screen - Bone Marrow Left Posterior Iliac Crest [[id number]] Collected:  10/03/19 1126    Order Status:  Sent Lab Status:  In process Updated:  10/03/19 1126    Specimen:  Bone Marrow     Plasma Cell Proliferative Disorder (PCPD), FISH [[id number]] Collected:  10/03/19 1125    Order Status:  Sent Lab Status:  In process Updated:  10/03/19 1126    Specimen:  Bone Marrow     Tissue Specimen to Pathology, Bone Marrow Aspiration/Biopsy Procedure [[id number]] Collected:  10/03/19 1125    Order Status:  Sent Lab Status:  No result      Specimen:  Bone Marrow Aspirate, Left Iliac Crest         Final Active Diagnoses:    Diagnosis Date Noted POA    Multiple myeloma [C90.00] 10/03/2019 Yes      Problems Resolved During this Admission:      Discharged Condition: good    Disposition: Home or Self Care    Follow Up:   Future Appointments   Date Time Provider Department Center   10/10/2019  9:00 AM LAB, HEMONC SAME DAY [facility]   10[facility]10[facility]2019 10[facility]   10/10/2019 11:00 AM INJECTION, [facility]ION [facility]   10/17/2019 11:00 AM [facility]   3/26/2020  8:00 AM [facility]       Patient Instructions:      Notify your health care provider if you experience any of the following:  temperature >100.4     Notify your health care provider if you experience any of the following:  persistent nausea and vomiting or diarrhea     Notify your health care provider if you experience any of the following:  severe uncontrolled pain     Notify your health care provider if you experience any of the following:  redness, tenderness, or signs of infection (pain, swelling, redness, odor or green/yellow discharge around incision site)     Notify your health care provider if you experience any of the following:  difficulty breathing or increased cough     Notify your health care provider if you experience any of the following:  severe persistent headache     Notify your health care provider if you experience any of the following:  persistent dizziness, light-headedness, or visual disturbances     Remove dressing in 24 hours     Activity as tolerated     Medications:  Reconciled Home Medications:      Medication List      Ask your doctor about these medications    acetaminophen 650 MG Tbsr  Commonly known as:  TYLENOL  Take 1,300 mg by mouth daily as needed (pain).     acyclovir 400 MG tablet  Commonly known as:  ZOVIRAX  Take 1 tablet (400 mg total) by  mouth 2 (two) times daily.     alirocumab 75 mg/mL Pnij  Commonly known as:  PRALUENT PEN  Inject 1 mL (75 mg total) into the skin every 14 (fourteen) days. Ask your PCP/ Oncologist     allopurinol 300 MG tablet  Commonly known as:  ZYLOPRIM  Take 1 tablet (300 mg total) by mouth once daily.     amLODIPine 5 MG tablet  Commonly known as:  NORVASC  TAKE 1 TABLET BY MOUTH EVERY DAY     aspirin 81 mg Tab  Take 1 tablet by mouth Daily.     bisacodyl 5 mg EC tablet  Commonly known as:  DULCOLAX  Take 5 mg by mouth once daily.     dexAMETHasone 4 MG Tab  Commonly known as:  DECADRON  Take 5 tablets (20 mg total) by mouth once a week. Take 5 tabs or 20 mg weekly, all tabs on same day as weekly Velcade injection     ferrous gluconate 324 mg (37.5 mg iron) Tab tablet  TAKE 1 TABLET BY MOUTH 2 (TWO) TIMES DAILY WITH MEALS.     gabapentin 300 MG capsule  Commonly known as:  NEURONTIN  TAKE 1 CAPSULE (300 MG TOTAL) BY MOUTH 2 (TWO) TIMES DAILY.     omeprazole 40 MG capsule  Commonly known as:  PRILOSEC  TAKE 1 CAPSULE (40 MG TOTAL) BY MOUTH ONCE DAILY.     ondansetron 4 MG tablet  Commonly known as:  ZOFRAN  Take 1 tablet (4 mg total) by mouth every 6 (six) hours as needed for Nausea.     ondansetron 8 MG Tbdl  Commonly known as:  ZOFRAN-ODT  Take 1 tablet (8 mg total) by mouth every 8 (eight) hours as needed (nausea/vomiting).     REVLIMID 25 mg Cap  Generic drug:  lenalidomide  Take 1 capsule (25 mg total) by mouth once daily auth [id number] 9/20/19.     sertraline 50 MG tablet  Commonly known as:  ZOLOFT  TAKE 1 TABLET (50 MG TOTAL) BY MOUTH ONCE DAILY.     traMADol 50 mg tablet  Commonly known as:  ULTRAM  Take 1 tablet (50 mg total) by mouth 2 (two) times daily as needed for Pain.     vitamin D 1000 units Tab  Commonly known as:  VITAMIN D3  Take 185 mg by mouth once daily.            Kathleen Kimball NP  Bone Marrow Transplant  [facility]

## 2019-10-03 NOTE — TRANSFER OF CARE
"Anesthesia Transfer of Care Note    Patient: Shelby Thompson    Procedure(s) Performed: Procedure(s) (LRB):  Biopsy-bone marrow (Left)    Patient location: [facility]    Anesthesia Type: general    Transport from OR: Transported from OR on room air with adequate spontaneous ventilation    Post pain: adequate analgesia    Post assessment: no apparent anesthetic complications and tolerated procedure well    Post vital signs: stable    Level of consciousness: sedated    Nausea/Vomiting: no nausea/vomiting    Complications: none    Transfer of care protocol was followed      Last vitals:   Visit Vitals  BP (!) 153/70 (BP Location: Right arm, Patient Position: Lying)   Pulse 69   Temp 36.8 °C (98.2 °F) (Oral)   Resp 18   Ht 5' 3" (1.6 m)   Wt 74.4 kg (164 lb)   LMP  (LMP Unknown)   SpO2 97%   Breastfeeding? No   BMI 29.05 kg/m²     "

## 2019-10-03 NOTE — ANESTHESIA POSTPROCEDURE EVALUATION
Anesthesia Post Evaluation    Patient: Shelby Thompson    Procedure(s) Performed: Procedure(s) (LRB):  Biopsy-bone marrow (Left)    Final Anesthesia Type: general  Patient location during evaluation: [facility]  Patient participation: Yes- Able to Participate  Level of consciousness: awake and alert and oriented  Post-procedure vital signs: reviewed and stable  Pain management: adequate  Airway patency: patent  PONV status at discharge: No PONV  Anesthetic complications: no      Cardiovascular status: blood pressure returned to baseline and hemodynamically stable  Respiratory status: unassisted, spontaneous ventilation and room air  Hydration status: euvolemic  Follow-up not needed.          Vitals Value Taken Time   /79 10/3/2019 12:30 PM   Temp 36.5 °C (97.7 °F) 10/3/2019 12:30 PM   Pulse 60 10/3/2019 12:30 PM   Resp 18 10/3/2019 12:30 PM   SpO2 97 % 10/3/2019 12:30 PM         No case tracking events are documented in the log.      Pain/Yana Score: Yana Score: 10 (10/3/2019 12:32 PM)

## 2019-10-03 NOTE — BRIEF OP NOTE
PROCEDURE NOTE:  Date of procedure: 10/3/2019  Bone Marrow aspiration and biopsy  Indication: restaging bone marrow biopsy  Consent: Informed consent was obtained from patient.  Timeout: Done and documented.  Position: right lateral  Site: Left posterior illiac crest.  Prep: Betadine.  Needle used: 11 gauge Jamshidi needle.  Anesthetic: 1% lidocaine 5 cc.  Biopsy: The biopsy needle was introduced into the marrow cavity on multiple attempts and 15 cc of aspirate obtained on 3rd attempt without complications and sent for flow, cytogenetics and FISH. Core biopsy obtained without difficulty and sent for routine histologic examination.  Complications: None.  EBL: minimal  Disposition: The patient was discharged home per anesthesia protocol.     Kathleen Kimball NP  Hematology/BMT

## 2019-10-03 NOTE — ANESTHESIA PREPROCEDURE EVALUATION
10/03/2019  Shelby Thompson is a 69 y.o., female.    Pre-op Assessment    I have reviewed the Patient Summary Reports.     I have reviewed the Nursing Notes.      Review of Systems  Anesthesia Hx:  No problems with previous Anesthesia  History of prior surgery of interest to airway management or planning:  Denies Personal Hx of Anesthesia complications.   Social:  Former Smoker    Hematology/Oncology:        Current/Recent Cancer.   Cardiovascular:   Hypertension CHF ACEVEDO ECHO reviewed   Pulmonary:   Shortness of breath    Renal/:   Chronic Renal Disease    Hepatic/GI:   GERD    Neurological:   CVA, residual symptoms Neuromuscular Disease,    Psych:   Psychiatric History          Physical Exam  General:  Well nourished, Obesity    Airway/Jaw/Neck:  Airway Findings: Mouth Opening: Normal Tongue: Normal  General Airway Assessment: Adult  Mallampati: II  TM Distance: Normal, at least 6 cm  Jaw/Neck Findings:  Neck ROM: Normal ROM     Eyes/Ears/Nose:  Eyes/Ears/Nose Findings:    Dental:  Dental Findings: Edentulous   Chest/Lungs:  Chest/Lungs Findings: Normal Respiratory Rate, Clear to auscultation     Heart/Vascular:  Heart Findings: Rate: Normal  Rhythm: Regular Rhythm  Sounds: Normal        Mental Status:  Mental Status Findings:  Cooperative, Alert and Oriented         Anesthesia Plan  Type of Anesthesia, risks & benefits discussed:  Anesthesia Type:  general  Patient's Preference: general  Intra-op Monitoring Plan: standard ASA monitors  Intra-op Monitoring Plan Comments:   Post Op Pain Control Plan:   Post Op Pain Control Plan Comments:   Induction:   IV  Beta Blocker:  Patient is not currently on a Beta-Blocker (No further documentation required).       Informed Consent: Patient understands risks and agrees with Anesthesia plan.  Questions answered. Anesthesia consent signed with patient.  ASA Score: 3      Day of Surgery Review of History & Physical:    H&P update referred to the surgeon.         Ready For Surgery From Anesthesia Perspective.

## 2019-10-03 NOTE — INTERVAL H&P NOTE
The patient has been examined and the H&P has been reviewed:    I concur with the findings and no changes have occurred since H&P was written.    Anesthesia/Surgery risks, benefits and alternative options discussed and understood by patient/family.          Active Hospital Problems    Diagnosis  POA    Multiple myeloma [C90.00]  Yes      Resolved Hospital Problems   No resolved problems to display.

## 2019-10-04 LAB
BODY SITE - BONE MARROW: NORMAL
BONE MARROW IRON STAIN COMMENT: NORMAL
BONE MARROW WRIGHT STAIN COMMENT: NORMAL
CLINICAL DIAGNOSIS - BONE MARROW: NORMAL
FLOW CYTOMETRY ANTIBODIES ANALYZED - BONE MARROW: NORMAL
FLOW CYTOMETRY COMMENT - BONE MARROW: NORMAL
FLOW CYTOMETRY INTERPRETATION - BONE MARROW: NORMAL

## 2019-10-07 RX ORDER — GABAPENTIN 300 MG/1
300 CAPSULE ORAL 2 TIMES DAILY
Qty: 180 CAPSULE | Refills: 3 | Status: ON HOLD | OUTPATIENT
Start: 2019-10-07 | End: 2020-02-26 | Stop reason: SDUPTHER

## 2019-10-10 ENCOUNTER — LAB VISIT (OUTPATIENT)
Dept: LAB | Facility: HOSPITAL | Age: 69
End: 2019-10-10
Payer: MEDICARE

## 2019-10-10 ENCOUNTER — OFFICE VISIT (OUTPATIENT)
Dept: HEMATOLOGY/ONCOLOGY | Facility: CLINIC | Age: 69
End: 2019-10-10
Payer: MEDICARE

## 2019-10-10 ENCOUNTER — INFUSION (OUTPATIENT)
Dept: INFUSION THERAPY | Facility: HOSPITAL | Age: 69
End: 2019-10-10
Attending: STUDENT IN AN ORGANIZED HEALTH CARE EDUCATION/TRAINING PROGRAM
Payer: MEDICARE

## 2019-10-10 VITALS
DIASTOLIC BLOOD PRESSURE: 75 MMHG | OXYGEN SATURATION: 95 % | TEMPERATURE: 98 F | HEIGHT: 63 IN | WEIGHT: 165.56 LBS | SYSTOLIC BLOOD PRESSURE: 126 MMHG | BODY MASS INDEX: 29.34 KG/M2 | HEART RATE: 76 BPM

## 2019-10-10 DIAGNOSIS — Z76.82 STEM CELL TRANSPLANT CANDIDATE: ICD-10-CM

## 2019-10-10 DIAGNOSIS — C90.00 MULTIPLE MYELOMA, REMISSION STATUS UNSPECIFIED: ICD-10-CM

## 2019-10-10 DIAGNOSIS — C90.00 MULTIPLE MYELOMA, REMISSION STATUS UNSPECIFIED: Primary | ICD-10-CM

## 2019-10-10 DIAGNOSIS — D69.6 THROMBOCYTOPENIA: ICD-10-CM

## 2019-10-10 DIAGNOSIS — C90.00 MULTIPLE MYELOMA NOT HAVING ACHIEVED REMISSION: ICD-10-CM

## 2019-10-10 DIAGNOSIS — C90.00 MULTIPLE MYELOMA NOT HAVING ACHIEVED REMISSION: Primary | ICD-10-CM

## 2019-10-10 DIAGNOSIS — R06.02 SHORTNESS OF BREATH ON EXERTION: ICD-10-CM

## 2019-10-10 LAB
ALBUMIN SERPL BCP-MCNC: 3.5 G/DL (ref 3.5–5.2)
ALP SERPL-CCNC: 62 U/L (ref 55–135)
ALT SERPL W/O P-5'-P-CCNC: 9 U/L (ref 10–44)
ANION GAP SERPL CALC-SCNC: 10 MMOL/L (ref 8–16)
AST SERPL-CCNC: 10 U/L (ref 10–40)
BASOPHILS # BLD AUTO: 0.03 K/UL (ref 0–0.2)
BASOPHILS NFR BLD: 0.7 % (ref 0–1.9)
BILIRUB SERPL-MCNC: 0.7 MG/DL (ref 0.1–1)
BUN SERPL-MCNC: 12 MG/DL (ref 8–23)
CALCIUM SERPL-MCNC: 9.1 MG/DL (ref 8.7–10.5)
CHLORIDE SERPL-SCNC: 105 MMOL/L (ref 95–110)
CO2 SERPL-SCNC: 28 MMOL/L (ref 23–29)
CREAT SERPL-MCNC: 0.9 MG/DL (ref 0.5–1.4)
DIFFERENTIAL METHOD: ABNORMAL
EOSINOPHIL # BLD AUTO: 0.1 K/UL (ref 0–0.5)
EOSINOPHIL NFR BLD: 2.9 % (ref 0–8)
ERYTHROCYTE [DISTWIDTH] IN BLOOD BY AUTOMATED COUNT: 20.5 % (ref 11.5–14.5)
EST. GFR  (AFRICAN AMERICAN): >60 ML/MIN/1.73 M^2
EST. GFR  (NON AFRICAN AMERICAN): >60 ML/MIN/1.73 M^2
GLUCOSE SERPL-MCNC: 135 MG/DL (ref 70–110)
HCT VFR BLD AUTO: 33.7 % (ref 37–48.5)
HGB BLD-MCNC: 9.9 G/DL (ref 12–16)
IGA SERPL-MCNC: 59 MG/DL (ref 40–350)
IGG SERPL-MCNC: 826 MG/DL (ref 650–1600)
IGM SERPL-MCNC: 21 MG/DL (ref 50–300)
IMM GRANULOCYTES # BLD AUTO: 0.01 K/UL (ref 0–0.04)
IMM GRANULOCYTES NFR BLD AUTO: 0.2 % (ref 0–0.5)
LYMPHOCYTES # BLD AUTO: 0.4 K/UL (ref 1–4.8)
LYMPHOCYTES NFR BLD: 9.5 % (ref 18–48)
MCH RBC QN AUTO: 21.7 PG (ref 27–31)
MCHC RBC AUTO-ENTMCNC: 29.4 G/DL (ref 32–36)
MCV RBC AUTO: 74 FL (ref 82–98)
MONOCYTES # BLD AUTO: 0.9 K/UL (ref 0.3–1)
MONOCYTES NFR BLD: 21.7 % (ref 4–15)
NEUTROPHILS # BLD AUTO: 2.7 K/UL (ref 1.8–7.7)
NEUTROPHILS NFR BLD: 65 % (ref 38–73)
NRBC BLD-RTO: 0 /100 WBC
PLATELET # BLD AUTO: 204 K/UL (ref 150–350)
PMV BLD AUTO: ABNORMAL FL (ref 9.2–12.9)
POTASSIUM SERPL-SCNC: 3.1 MMOL/L (ref 3.5–5.1)
PROT SERPL-MCNC: 6.9 G/DL (ref 6–8.4)
RBC # BLD AUTO: 4.56 M/UL (ref 4–5.4)
SODIUM SERPL-SCNC: 143 MMOL/L (ref 136–145)
WBC # BLD AUTO: 4.11 K/UL (ref 3.9–12.7)

## 2019-10-10 PROCEDURE — 86334 IMMUNOFIX E-PHORESIS SERUM: CPT | Mod: HCNC

## 2019-10-10 PROCEDURE — 96401 CHEMO ANTI-NEOPL SQ/IM: CPT

## 2019-10-10 PROCEDURE — 3078F PR MOST RECENT DIASTOLIC BLOOD PRESSURE < 80 MM HG: ICD-10-PCS | Mod: HCNC,CPTII,GC,S$GLB | Performed by: STUDENT IN AN ORGANIZED HEALTH CARE EDUCATION/TRAINING PROGRAM

## 2019-10-10 PROCEDURE — 82784 ASSAY IGA/IGD/IGG/IGM EACH: CPT | Mod: 59,HCNC

## 2019-10-10 PROCEDURE — 1101F PR PT FALLS ASSESS DOC 0-1 FALLS W/OUT INJ PAST YR: ICD-10-PCS | Mod: HCNC,CPTII,GC,S$GLB | Performed by: STUDENT IN AN ORGANIZED HEALTH CARE EDUCATION/TRAINING PROGRAM

## 2019-10-10 PROCEDURE — 1101F PT FALLS ASSESS-DOCD LE1/YR: CPT | Mod: HCNC,CPTII,GC,S$GLB | Performed by: STUDENT IN AN ORGANIZED HEALTH CARE EDUCATION/TRAINING PROGRAM

## 2019-10-10 PROCEDURE — 3074F PR MOST RECENT SYSTOLIC BLOOD PRESSURE < 130 MM HG: ICD-10-PCS | Mod: HCNC,CPTII,GC,S$GLB | Performed by: STUDENT IN AN ORGANIZED HEALTH CARE EDUCATION/TRAINING PROGRAM

## 2019-10-10 PROCEDURE — 84165 PATHOLOGIST INTERPRETATION SPE: ICD-10-PCS | Mod: 26,HCNC,, | Performed by: PATHOLOGY

## 2019-10-10 PROCEDURE — 99999 PR PBB SHADOW E&M-EST. PATIENT-LVL IV: CPT | Mod: PBBFAC,HCNC,GC, | Performed by: STUDENT IN AN ORGANIZED HEALTH CARE EDUCATION/TRAINING PROGRAM

## 2019-10-10 PROCEDURE — 99215 PR OFFICE/OUTPT VISIT, EST, LEVL V, 40-54 MIN: ICD-10-PCS | Mod: HCNC,GC,S$GLB, | Performed by: STUDENT IN AN ORGANIZED HEALTH CARE EDUCATION/TRAINING PROGRAM

## 2019-10-10 PROCEDURE — 84165 PROTEIN E-PHORESIS SERUM: CPT | Mod: 26,HCNC,, | Performed by: PATHOLOGY

## 2019-10-10 PROCEDURE — 86334 IMMUNOFIX E-PHORESIS SERUM: CPT | Mod: 26,HCNC,, | Performed by: PATHOLOGY

## 2019-10-10 PROCEDURE — 85025 COMPLETE CBC W/AUTO DIFF WBC: CPT | Mod: HCNC

## 2019-10-10 PROCEDURE — 86334 PATHOLOGIST INTERPRETATION IFE: ICD-10-PCS | Mod: 26,HCNC,, | Performed by: PATHOLOGY

## 2019-10-10 PROCEDURE — 3074F SYST BP LT 130 MM HG: CPT | Mod: HCNC,CPTII,GC,S$GLB | Performed by: STUDENT IN AN ORGANIZED HEALTH CARE EDUCATION/TRAINING PROGRAM

## 2019-10-10 PROCEDURE — 80053 COMPREHEN METABOLIC PANEL: CPT | Mod: HCNC

## 2019-10-10 PROCEDURE — 99215 OFFICE O/P EST HI 40 MIN: CPT | Mod: HCNC,GC,S$GLB, | Performed by: STUDENT IN AN ORGANIZED HEALTH CARE EDUCATION/TRAINING PROGRAM

## 2019-10-10 PROCEDURE — 63600175 PHARM REV CODE 636 W HCPCS: Mod: JG,HCNC | Performed by: INTERNAL MEDICINE

## 2019-10-10 PROCEDURE — 84165 PROTEIN E-PHORESIS SERUM: CPT | Mod: HCNC

## 2019-10-10 PROCEDURE — 99999 PR PBB SHADOW E&M-EST. PATIENT-LVL IV: ICD-10-PCS | Mod: PBBFAC,HCNC,GC, | Performed by: STUDENT IN AN ORGANIZED HEALTH CARE EDUCATION/TRAINING PROGRAM

## 2019-10-10 PROCEDURE — 36415 COLL VENOUS BLD VENIPUNCTURE: CPT | Mod: HCNC

## 2019-10-10 PROCEDURE — 96401 CHEMO ANTI-NEOPL SQ/IM: CPT | Mod: HCNC

## 2019-10-10 PROCEDURE — 83520 IMMUNOASSAY QUANT NOS NONAB: CPT | Mod: 59,HCNC

## 2019-10-10 PROCEDURE — 3078F DIAST BP <80 MM HG: CPT | Mod: HCNC,CPTII,GC,S$GLB | Performed by: STUDENT IN AN ORGANIZED HEALTH CARE EDUCATION/TRAINING PROGRAM

## 2019-10-10 RX ORDER — BORTEZOMIB 3.5 MG/1
1.3 INJECTION, POWDER, LYOPHILIZED, FOR SOLUTION INTRAVENOUS; SUBCUTANEOUS
Status: COMPLETED | OUTPATIENT
Start: 2019-10-10 | End: 2019-10-10

## 2019-10-10 RX ORDER — LIDOCAINE 50 MG/G
1 PATCH TOPICAL DAILY
Qty: 60 PATCH | Refills: 0 | Status: SHIPPED | OUTPATIENT
Start: 2019-10-10 | End: 2019-12-09

## 2019-10-10 RX ORDER — POTASSIUM CHLORIDE 20 MEQ/1
20 TABLET, EXTENDED RELEASE ORAL DAILY
Qty: 5 TABLET | Refills: 0 | Status: SHIPPED | OUTPATIENT
Start: 2019-10-10 | End: 2019-10-15

## 2019-10-10 RX ADMIN — BORTEZOMIB 2.4 MG: 3.5 INJECTION, POWDER, LYOPHILIZED, FOR SOLUTION INTRAVENOUS; SUBCUTANEOUS at 01:10

## 2019-10-10 NOTE — Clinical Note
She is currently scheduled to get one more Velcade next week, is it ok to continue with that or should we stop at this point since she is getting ready for transplant?

## 2019-10-10 NOTE — PROGRESS NOTES
PATIENT: Shelby Thompson  MRN: [id number]  DATE: 10/11/2019      Diagnosis:   1. Multiple myeloma not having achieved remission        Chief Complaint: Multiple myeloma not having achieved remission      Oncologic History:     Multiple Myeloma- presented w CRAB criteria (Hgb 7.1, hypercalcemia, renal function - Cr of 2.7, T7 vertebral fracture)  Kappa, RISS Stage III (beta-2 micro globulin of 17.5 and 14:20 translocation) High Risk disease   M-protein was 6.75, urine 600 mg of proteinbiopsy on 5/3 showed 70-80% plasma cells; k/l of 693.7, k of 548  CT on 5/15 results as above with left scapula lesion SUV 3, L5 vertebral body lesion SUV 4.7, stable T7 compression fracture and minimally displaced left lateral 4th rib fracture; no activity in previously noted in splenic or hepatic regions     VRD C1 completed on 5/23 - 6/13;l CrCl of 48 and so revilimid 10 mg; pt on acyclovir and aspirin 81, zometa-given on 5/.10 edentulous  Start C2 on 6/20  Currently on C6D1 of Velcade, on 10/10     2 prior CVAs (one in 2008, one in 2011)  L breast cancer DCIS stage 0 (s/p lumpectomy and radiation, no endocrine tx due to CVA)  HTN  DM II  Neuropathy, b/l hands  Prior smoker, quit in 2011  Hepatic lesions - vascular per recent MRI in 05/2019; recs per hepatology conference to scans q3 mo anIntolerance   Statin Intolerance - on praluent, PCSK9 inhibitor   HFpEF - EF 55%, diastolic dysfunction  Anxiety/Depression        Oncologic History:   Pt is a 70 yo AAF with PMHx of COPD, two prior CVAs -one hemorhagic and one ishcemic (2008 and 2011, w residual defecitis and weaknessin R side/tremor on R hand), L Breast cancer s/p lumpectomy and radiation for stage 0 DCIS( in 2011, did not do endocrine tx due to CVA at that time), PVcs, DMII (not on current meds, prior A1c of 7.4), HTN, neuropathy of bilateral hands, liver lesions (which are possibly vascular per recent MRI in 05/2019), and hx of iron defeciency anemia, statin intolerance (on a  PCSK9 inihibitor, alirocumab or praluent), anxiety/derpression, new onset Heart Failure, who presents to the clinic for further management of her recently diagnosed multiple myeloma. The pt states that she had not known about heart failure until this past admission, when she was note to have an EF of 55% and grade II diastolic dysfunction w elevated pulm artery pressure of 41; she was admitted to the hospital on 4/30/19 - 5/8/19 for an exacerbation of her worsening dyspnea over the prior two to three weeks as well as intermittent memory loss. She was noted to have O2 sats of 87% in her PCP, Dr. Arevalo's office and intiially was sent to the hospital in the evaluation for a PE. A CTA chest which was completed did not reveal any thrombus in pulmonary arteries but did reveal b/l ground glass opacities. At the time of her admission, she complained about pain under L breast and also pain in her L flank. PAtient does not recall if she had lost weight over the past few months but does think that she lost some weight in this last admission. Since leaving the [facility], the patient has been more dependent on a rollator to move around. Also, the patient has had worsening nausea at home without any vomiting.      The patient had anemia to Hgb of 7.1, corrected calcium of 10.6, decreased renal function with Cr of 2.7, and a new T7 veterbral fracture. She currently has mid/low back pain and L flank pain.  Kappa was noted to be 548, with a k/l ratio of 693.7. Bone marrow biopsy completed on 5/3 revealed a plasma cell population of 70-80% for this patient. She has a PET-CT scheduled for 5/15. Beta2 microlglobulin was noted to be 17.5 and albumin of 2.5. Also, the patient was found to have an IgG kappa monoclonal protein  THe M-protein was noted to be 6.75 g/dL. Urine protein quantification is still pending.  The pt has had numbness /tingling in her hands post CVA, for which she is on gabapentin 300 mg BID.  The patient also had renal  failure initially which dropped to 1.4 yesterday; she is s/p four days of dexamethasone x 40 mg.         The patient had an EGD in 2016 which had revealed non-bleeding angioectasia and also a colonosopy in March 2015 which showed a 5 mm hyperplastic polyp. In addition, the patient has been on iron supplements, with ferrous gluconate 324 BID. During recent admission in 05/2019, The pt did receive 1 U of pRBC in the hospital. Her ferritin is 54; the pt has not required prior trasnsfusion and no history of IV iron use.  Also, the patient is still on allopurinol 300 mg for her suspected TLS noted in the hospital. Uric acid on her discharge was 8.2. Her Hgb was 7.8 on day of discharge for this paitnet.    IN the past, she had a referral to Hepatology for elevated liver enzymes and found to have liver lesions and had subsequent US guided liver biopsy in 2015, which did not reveal fibrosis and was otherwise normal in apparenace. The patient had negative serolologic testing for Gino's, alpha-1 antitrypsin defeciency, hemochromatosis, autoimmune hepatitis, hemochromatosis, and viral hepatiitis at that time. MRI abdomen/pelvis completed on 5/1/19 - pt was found to have multiple intedeterminate hepatic lesions and two additional hepatic lesions from prior ultrasound were suggestive of vascular malformations (CT had revealed hepatic lesions measuring 1.8 cm in hepatic segment 5 and also 1.3 cm in hepatic segment 8). The pt had a splenic lesion, which was noted to be about 2 cm on CT abdomen/pelvis on 4/10/19. Also, the patient had a 1 cm soft tissue lesion anterior to aorta and inferior to 3rd portion of duodenum. She had  thickened endometrium noted on pelvic ultrasound, which was not thought to require any further evaluation given no sx at time, when pt was evaluated by OB/GYN Dr. Teran.      For the heart failure, the ptis on lasix 20 mg daily for diuresis. A congo red stain was completed on BM biopsy and was negative.    She had COPD and quit smokling in 2011, after smoking about 1/2 pack a day for about 42 years. She does not have any regular use of inhalers and does not keep any at home. She is currently on baby aspirin at home. Pt is edentulous.    The patient is seen with her  and also her daughter, Jessica. Also, the patient has one sister with breast cancer and one sister with lung cancer; there is no hematologic malignancy in the family. She is a retired .         Continuing 20 mg of dex, has two more weeks supply of 10 mg revilimid, and continuign   She had walked around in the mall with minimal support.   She had been drinking much water. Weight is stable at 168 pounds   NEuropathy is stable, mostly in R hand.  Her nose has been running and also ears, has some allergies to pollen, vents at home..discussed trying claritin vs. Zyrtec.        Subjective:   Completed on ;  last zometa on 9/19. C6D1 of Velcade  Being considered for [id number] autoSCT.  Neuropathy is about the same, no diarrhea currently.   Overall, she continues to feel well without many complaints.     Past Medical History:   Past Medical History:   Diagnosis Date    Acute respiratory failure with hypoxia 4/30/2019    FUENTES (acute kidney injury) 5/1/2019    Allergy     Anemia     Aortic aneurysm     Breast cancer 10/2011    left breast Stage 0 DCIS    Chronic diastolic congestive heart failure 11/6/2015    Colon polyp     GERD (gastroesophageal reflux disease)     History of colonic polyps     HX: breast cancer     Hyperlipemia     Hypertension     ICH (intracerebral hemorrhage)     Major depressive disorder, single episode, mild 6/23/2016    Nuclear sclerosis 7/21/2014    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 7/21/2014    PAD (peripheral artery disease) 11/6/2015    Statin-induced rhabdomyolysis     4/2015     Stroke 4/2011       Past Surgical HIstory:   Past Surgical History:   Procedure Laterality  Date    APPENDECTOMY      BONE MARROW BIOPSY Left 10/3/2019    Procedure: Biopsy-bone marrow;  Surgeon: Jere Tineo MD;  Location: [facility] OR [address] [place] [address];  Service: Oncology;  Laterality: Left;    BREAST BIOPSY Left 10/2011    BREAST LUMPECTOMY Left 2011    DCIS    COLONOSCOPY      CYST REMOVAL      back    ESOPHAGOGASTRODUODENOSCOPY  07/2016    duodenal angioectasia    FOOT FRACTURE SURGERY  10/2012    right foot, with R hallux valgus repair    FRACTURE SURGERY Right     broken toe repiar    HEMORRHOID SURGERY      LIVER BIOPSY  04/2015    essentially normal, no fibrosis    TUBAL LIGATION      UPPER GASTROINTESTINAL ENDOSCOPY         Family History:   Family History   Problem Relation Age of Onset    No Known Problems Sister     Cancer Sister 63        Lung Cancer    No Known Problems Mother     Cancer Father     No Known Problems Brother     Hypertension Daughter     Fibroids Daughter         uterine    Hypertension Son     Breast cancer Sister 62    No Known Problems Brother     No Known Problems Maternal Aunt     No Known Problems Maternal Uncle     No Known Problems Paternal Aunt     No Known Problems Paternal Uncle     Hypertension Maternal Grandmother     No Known Problems Maternal Grandfather     No Known Problems Paternal Grandmother     No Known Problems Paternal Grandfather     Ovarian cancer Neg Hx     Colon cancer Neg Hx     Tremor Neg Hx     Amblyopia Neg Hx     Blindness Neg Hx     Cataracts Neg Hx     Diabetes Neg Hx     Glaucoma Neg Hx     Macular degeneration Neg Hx     Retinal detachment Neg Hx     Strabismus Neg Hx     Stroke Neg Hx     Thyroid disease Neg Hx     Esophageal cancer Neg Hx     Rectal cancer Neg Hx     Stomach cancer Neg Hx     Ulcerative colitis Neg Hx     Crohn's disease Neg Hx     Irritable bowel syndrome Neg Hx     Celiac disease Neg Hx        Social History:  reports that she quit smoking about 8 years ago. Her smoking use  included cigarettes. She has a 10.00 pack-year smoking history. She quit smokeless tobacco use about 8 years ago. She reports that she drinks alcohol. She reports that she does not use drugs.    Allergies:  Review of patient's allergies indicates:   Allergen Reactions    Lipitor [atorvastatin]      Itching, elevated cpk, muscle aches, statin induced myositis       Medications:  Current Outpatient Medications   Medication Sig Dispense Refill    acetaminophen (TYLENOL) 650 MG TbSR Take 1,300 mg by mouth daily as needed (pain).      acyclovir (ZOVIRAX) 400 MG tablet Take 1 tablet (400 mg total) by mouth 2 (two) times daily. 120 tablet 2    alirocumab (PRALUENT PEN) 75 mg/mL PnIj Inject 1 mL (75 mg total) into the skin every 14 (fourteen) days. Ask your PCP/ Oncologist 2 mL 11    allopurinol (ZYLOPRIM) 300 MG tablet Take 1 tablet (300 mg total) by mouth once daily. 30 tablet 1    amLODIPine (NORVASC) 5 MG tablet TAKE 1 TABLET BY MOUTH EVERY DAY 90 tablet 3    aspirin 81 mg Tab Take 1 tablet by mouth Daily.      bisacodyl (DULCOLAX) 5 mg EC tablet Take 5 mg by mouth once daily.      dexAMETHasone (DECADRON) 4 MG Tab Take 5 tablets (20 mg total) by mouth once a week. Take 5 tabs or 20 mg weekly, all tabs on same day as weekly Velcade injection 60 tablet 0    ferrous gluconate 324 mg (37.5 mg iron) Tab TAKE 1 TABLET BY MOUTH 2 (TWO) TIMES DAILY WITH MEALS. 180 tablet 0    gabapentin (NEURONTIN) 300 MG capsule TAKE 1 CAPSULE (300 MG TOTAL) BY MOUTH 2 (TWO) TIMES DAILY. 180 capsule 3    omeprazole (PRILOSEC) 40 MG capsule TAKE 1 CAPSULE (40 MG TOTAL) BY MOUTH ONCE DAILY. 90 capsule 0    ondansetron (ZOFRAN) 4 MG tablet Take 1 tablet (4 mg total) by mouth every 6 (six) hours as needed for Nausea. 30 tablet 1    ondansetron (ZOFRAN-ODT) 8 MG TbDL Take 1 tablet (8 mg total) by mouth every 8 (eight) hours as needed (nausea/vomiting). 60 tablet 1    REVLIMID (REVLIMID) 25 mg Cap Take 1 capsule (25 mg total) by  "mouth once daily auth [id number] 9/20/19. 21 capsule 0    sertraline (ZOLOFT) 50 MG tablet TAKE 1 TABLET (50 MG TOTAL) BY MOUTH ONCE DAILY. 90 tablet 3    traMADol (ULTRAM) 50 mg tablet Take 1 tablet (50 mg total) by mouth 2 (two) times daily as needed for Pain. 60 tablet 3    vitamin D 1000 units Tab Take 185 mg by mouth once daily.      lidocaine (LIDODERM) 5 % Place 1 patch onto the skin once daily. Remove & Discard patch within 12 hours or as directed by MD 60 patch 0    potassium chloride SA (K-DUR,KLOR-CON) 20 MEQ tablet Take 1 tablet (20 mEq total) by mouth once daily for 5 days. 5 tablet 0     Current Facility-Administered Medications   Medication Dose Route Frequency Provider Last Rate Last Dose    zoledronic acid (ZOMETA) 4 mg in sodium chloride 0.9% 100 mL IVPB  4 mg Intravenous 1 time in Clinic/HOD Genny Napoles MD           Review of Systems   Constitutional: Negative for fatigue and unexpected weight change.   Respiratory: Negative for cough.    Cardiovascular: Negative for chest pain and palpitations.   Gastrointestinal: Negative for abdominal pain.   Genitourinary: Negative for difficulty urinating and dysuria.   Musculoskeletal: Positive for back pain and gait problem.   Skin: Negative for rash.   Neurological: Positive for tremors, weakness and numbness. Negative for headaches.   Hematological: Negative for adenopathy.       ECOG Performance Status: 1   Objective:      Vitals:   Vitals:    10/10/19 1027   BP: 126/75   Pulse: 76   Temp: 98.2 °F (36.8 °C)   SpO2: 95%   Weight: 75.1 kg (165 lb 9.1 oz)   Height: 5' 3" (1.6 m)             Physical Exam   Constitutional: She is oriented to person, place, and time. She appears well-developed and well-nourished. No distress.   HENT:   Head: Normocephalic and atraumatic.   Mouth/Throat: Oropharynx is clear and moist.   Eyes: Pupils are equal, round, and reactive to light. Conjunctivae and EOM are normal.   Neck: Normal range of motion. Neck supple. "   Cardiovascular: Normal rate, regular rhythm and normal heart sounds. Exam reveals no gallop and no friction rub.   No murmur heard.  Pulmonary/Chest: Effort normal and breath sounds normal. No respiratory distress. She has no wheezes. She has no rales.   Abdominal: Soft. Bowel sounds are normal. She exhibits no distension. There is no tenderness. There is no guarding.   Musculoskeletal: Normal range of motion. She exhibits no edema.   Lymphadenopathy:     She has no cervical adenopathy.   Neurological: She is alert and oriented to person, place, and time. No cranial nerve deficit.   Intentional R hand tremor; RUE - 4/5 strength and RLE extremity also 4/5; full strengths on LUE and LLE   Skin: Skin is warm and dry. No rash noted.       Laboratory Data:  Lab Visit on 10/10/2019   Component Date Value Ref Range Status    WBC 10/10/2019 4.11  3.90 - 12.70 K/uL Final    RBC 10/10/2019 4.56  4.00 - 5.40 M/uL Final    Hemoglobin 10/10/2019 9.9* 12.0 - 16.0 g/dL Final    Hematocrit 10/10/2019 33.7* 37.0 - 48.5 % Final    Mean Corpuscular Volume 10/10/2019 74* 82 - 98 fL Final    Mean Corpuscular Hemoglobin 10/10/2019 21.7* 27.0 - 31.0 pg Final    Mean Corpuscular Hemoglobin Conc 10/10/2019 29.4* 32.0 - 36.0 g/dL Final    RDW 10/10/2019 20.5* 11.5 - 14.5 % Final    Platelets 10/10/2019 204  150 - 350 K/uL Final    MPV 10/10/2019 SEE COMMENT  9.2 - 12.9 fL Final    Result not available.    Immature Granulocytes 10/10/2019 0.2  0.0 - 0.5 % Final    Gran # (ANC) 10/10/2019 2.7  1.8 - 7.7 K/uL Final    Immature Grans (Abs) 10/10/2019 0.01  0.00 - 0.04 K/uL Final    Comment: Mild elevation in immature granulocytes is non specific and   can be seen in a variety of conditions including stress response,   acute inflammation, trauma and pregnancy. Correlation with other   laboratory and clinical findings is essential.      Lymph # 10/10/2019 0.4* 1.0 - 4.8 K/uL Final    Mono # 10/10/2019 0.9  0.3 - 1.0 K/uL Final     Eos # 10/10/2019 0.1  0.0 - 0.5 K/uL Final    Baso # 10/10/2019 0.03  0.00 - 0.20 K/uL Final    nRBC 10/10/2019 0  0 /100 WBC Final    Gran% 10/10/2019 65.0  38.0 - 73.0 % Final    Lymph% 10/10/2019 9.5* 18.0 - 48.0 % Final    Mono% 10/10/2019 21.7* 4.0 - 15.0 % Final    Eosinophil% 10/10/2019 2.9  0.0 - 8.0 % Final    Basophil% 10/10/2019 0.7  0.0 - 1.9 % Final    Differential Method 10/10/2019 Automated   Final    Sodium 10/10/2019 143  136 - 145 mmol/L Final    Potassium 10/10/2019 3.1* 3.5 - 5.1 mmol/L Final    Chloride 10/10/2019 105  95 - 110 mmol/L Final    CO2 10/10/2019 28  23 - 29 mmol/L Final    Glucose 10/10/2019 135* 70 - 110 mg/dL Final    BUN, Bld 10/10/2019 12  8 - 23 mg/dL Final    Creatinine 10/10/2019 0.9  0.5 - 1.4 mg/dL Final    Calcium 10/10/2019 9.1  8.7 - 10.5 mg/dL Final    Total Protein 10/10/2019 6.9  6.0 - 8.4 g/dL Final    Albumin 10/10/2019 3.5  3.5 - 5.2 g/dL Final    Total Bilirubin 10/10/2019 0.7  0.1 - 1.0 mg/dL Final    Comment: For infants and newborns, interpretation of results should be based  on gestational age, weight and in agreement with clinical  observations.  Premature Infant recommended reference ranges:  Up to 24 hours.............<8.0 mg/dL  Up to 48 hours............<12.0 mg/dL  3-5 days..................<15.0 mg/dL  6-29 days.................<15.0 mg/dL      Alkaline Phosphatase 10/10/2019 62  55 - 135 U/L Final    AST 10/10/2019 10  10 - 40 U/L Final    ALT 10/10/2019 9* 10 - 44 U/L Final    Anion Gap 10/10/2019 10  8 - 16 mmol/L Final    eGFR if African American 10/10/2019 >60.0  >60 mL/min/1.73 m^2 Final    eGFR if non African American 10/10/2019 >60.0  >60 mL/min/1.73 m^2 Final    Comment: Calculation used to obtain the estimated glomerular filtration  rate (eGFR) is the CKD-EPI equation.       Protein, Serum 10/10/2019 6.4  6.0 - 8.4 g/dL Final    Comment: Serum protein electrophoresis and immunofixation results should be   interpreted  in clinical context in that some therapeutic agents can   result   in false positive results (example, daratumumab). Correlation with   the   patient s therapeutic regimen is required.      Albumin grams/dl 10/10/2019 3.60  3.35 - 5.55 g/dL Final    Alpha-1 grams/dl 10/10/2019 0.38  0.17 - 0.41 g/dL Final    Alpha-2 grams/dl 10/10/2019 0.86  0.43 - 0.99 g/dL Final    Beta grams/dl 10/10/2019 0.85  0.50 - 1.10 g/dL Final    Gamma grams/dl 10/10/2019 0.71  0.67 - 1.58 g/dL Final    Immunofix Interp. 10/10/2019 SEE COMMENT   Final    Comment: Serum protein electrophoresis and immunofixation results should be   interpreted in clinical context in that some therapeutic agents can   result   in false positive results (example, daratumumab). Correlation with   the   patient s therapeutic regimen is required.  See pathologist's interpretation.      [place] Free Light Chains 10/10/2019 1.66  0.33 - 1.94 mg/dL Final    Lambda Free Light Chains 10/10/2019 0.96  0.57 - 2.63 mg/dL Final    Kappa/Lambda FLC Ratio 10/10/2019 1.73* 0.26 - 1.65 Final    IgG - Serum 10/10/2019 826  650 - 1600 mg/dL Final    IgG Cord Blood Reference Range: 650-1600 mg/dL.    IgA 10/10/2019 59  40 - 350 mg/dL Final    IgA Cord Blood Reference Range: <5 mg/dL.    IgM 10/10/2019 21* 50 - 300 mg/dL Final    IgM Cord Blood Reference Range: <25 mg/dL.         Imaging:   EXAMINATION:  NM PET CT WHOLE BODY    CLINICAL HISTORY:  Multiple myeloma, f/u after tx; Multiple myeloma not having achieved remission    TECHNIQUE:  10.99 mCi of F18-FDG was administered intravenously in the right hand.  After an approximately 60 min distribution time, PET/CT images were acquired from the skull vertex through the feet. Transmission images were acquired to correct for attenuation using a whole body low-dose CT scan without contrast with the arms positioned along the side of the torso. Glycemia at the time of injection was 91 mg/dL.    COMPARISON:  PET-CT  05/15/2019, transvaginal ultrasound 05/01/2019, CTA chest 04/30/2019, CT of the abdomen and pelvis 04/10/2019    FINDINGS:  Quality of the study: Adequate.    In the head and neck, there are no hypermetabolic lesions worrisome for malignancy. There are no hypermetabolic mucosal lesions, and there are no pathologically enlarged or hypermetabolic lymph nodes.    In the chest, there are no hypermetabolic lesions worrisome for malignancy.  There are no concerning pulmonary nodules or masses, and there are no pathologically enlarged or hypermetabolic lymph nodes.    In the abdomen and pelvis, there is physiologic tracer distribution within the abdominal organs and excretion into the genitourinary system.    Bones: Overall, there is a significant decrease in radiotracer uptake throughout the axial and appendicular skeleton.  Remote left lateral 4th rib fracture.  T7 compression fracture again seen with decreased metabolic prominence.    Index left focal scapular lesion with max SUV of 2.2 (previously 3.04)    Index L5 vertebral body lesion with max SUV of 3.29 (previously 4.67)    Physiologic uptake of tracer is present within the brain, salivary glands, myocardium, GI tract, and  tract.    Miscellaneous: Cardiomegaly.   Stable ectasia of the thoracic aorta, measuring up to 3.4 cm.  Small hiatal hernia.  Endometrium appears thickened and contains fluid, raising the question for cervical stenosis possibly secondary to chronic inflammation/fibrosis with neoplasm not excluded.    Uterine fibroids are again seen.      Impression       Significant decrease in radiotracer uptake throughout the axial and appendicular skeleton in this patient with multiple myeloma, suggesting a good response to treatment.    Endometrium appears thickened and fluid filled, raising the question for cervical stenosis possibly secondary to chronic inflammation/fibrosis with neoplasm not excluded.  Recommend correlation with OB/Gyn history and  consultation.    This report was flagged in Epic as abnormal.    I, Dawood Cifuentes MD, attest that I reviewed and interpreted the images.    Electronically signed by resident: Shannon Mejia  Date: 09/30/2019  Time: 11:35    Electronically signed by: Dawood Cifuentes  Date: 09/30/2019  Time: 14:48         Pathology:    ORDERING PHYSICIAN(S)  HORACIO FOLEY  CLINICAL DIAGNOSIS/INFORMATION  dm  PreOperative Diagnosis  SPECIMEN  1) Bone marrow clot, left iliac crest.  2) Bone marrow core biopsy, left iliac crest.  3) Bone marrow aspirate slides.  FINAL PATHOLOGIC DIAGNOSIS  CBC DATA 9/19/2019:  RBC: 4.65 M/UL, H/H :10.3/35 , MCV : 75 FL, WBC: 3.01 K/UL, Gran 62.2 %, Lymph 16.3%, Mono 11.3 %,  Eosinophil 8.6%, Basophil 1.3 %, Platelet: 193 K/UL.  No peripheral blood smear was submitted for evaluation.  LEFT ILIAC CREST BONE MARROW ASPIRATE, BONE MARROW CLOT, AND BONE MARROW CORE  BIOPSY WITH:  CELLULARITY=40-60%, TRILINEAGE HEMATOPOIETIC ACTIVITY (M/E=0.8:1).  NO MORPHOLOGIC EVIDENCE OF RESIDUAL PLASMA CELL NEOPLASM.  STORAGE IRON NOT IDENTIFIED.  ADEQUATE NUMBER OF MEGAKARYOCYTES.  COMMENT: Flow cytometry analysis of bone marrow aspirate shows lymph gate(3.7%) containing mostly T cells.  No B cell clonality or T-cell aberrancy is evident. Blast gate is not increased. Plasma cell study shows no light  chain restricted plasma cell population.  Immunohistochemical studies were performed on the clot and core biopsy for further architecture evaluation with  adequate positive and negative controls. About 4-5% plasma cells ( positive) are noted. In situ hybridization  for kappa and lambda light chain shows polytypic plasma population.    There is no morphologic evidence of residual plasma cell neoplasm. Correlate clinically and with a cytogenetics  report.  Diagnosed by: Ruby Flannery M.D.  (Electronically Signed: 2019-10-08 11:01:21)  Microscopic Examination  BONE MARROW ASPIRATE  DIFFERENTIAL:  Blasts----------------------------------------------1%  Promyelocytes---------------------------------0%  Myelocytes--------------------------------------4%  Metamyelocytes------------------------------ 6%  Bands----------------------------------------------7%  PMN Neutrophils------------------------------10%  Eosinophils---------------------------------------11%  Basophils-----------------------------------------4%  Monocytes---------------------------------------1%  Lymphocytes------------------------------------2%  Plasma cells--------------------------------------2%  Erythroid precursors---------------------------52%  BONE MARROW ASPIRATE:  Myeloid and erythroid maturation shows M:E ratio at 0.8:1. No significant dysplasia is evident. Erythroid hyperplasia is  noted. Stainable iron is not identified. Megakaryocytes are seen.  BONE MARROW CLOT:  Cellularity is 40-60% with trilineage hematopoiesis. No abnormal infiltrates are seen. Megakaryocytes are adequate  in number. Stainable iron is not identified.  BONE MARROW CORE BIOPSY:  Cellularity is%. No abnormal infiltrates are seen. Stainable iron is not identified. Megakaryocytes are adequate in  number.    Assessment:       1. Multiple myeloma not having achieved remission           Plan:        Multiple Myeloma  - ECOG PS 1  - pt met most of Crab criteria in hospital - had anemia (hgb 7.1) corrected calcium of 10.6, renal function with Cr of 2.7 and T7 verterbral fracture  - Kappa was noted to be 548, with a k/l ratio of 693.7.  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  At  diagnosis, this translocation has an unfavorable prognosis in multiple myeloma (Correa et al., Blood [id number], 2003).  - 14:20 translocation will place this as R-ISS Stage III, high risk disease  - Bone marrow biopsy  completed on 5/3 revealed a plasma cell population of 70-80% for this patient  -  PET-CT on 5/15 results as above with left scapula lesion SUV 3, L5 vertebral body lesion SUV 4.7, stable T7 compression fracture and minimally displaced left lateral 4th rib fracture; no activity in previously noted in splenic or hepatic regions   -SPEP reveals M-protein was noted to be 6.75 g/dL, NATALY reveals IgG kappa monoclonal protein    - Urine protein 24h quantification, 663 mg   - s/p four doses of dexamethasone 40 mg, w last dose on 5/9  - Plan for velcade 1.3 mg/m2 weekly on days 1,8.15.22 for 28 day cycles, also will plan to dose dexamethasone 40 mg weekly as well on days of velcade shots; currently revilimid at 10 mg/day  - Can dose revilmid to 25 mg a day; decrease dexamethasone to 20 mg a day  - went over risks of neuropathy, blood clots, VZV reactivation, and GI symptoms such as nausea, diarrhea and pt also asked to call us and/or come to ER for new fevers greater than 100.4F or new onset brusing bleeding. Pt agrees to proceed with therapy and has had  sign consent form due to limited dexterity with R hand  -  continue aspirin 81 mg daily; continue acyclovir 400 BID, discussed risks of shingles if this medication is not included in regimen  - Hgb of 10.2 today   - plan for zometa monthly, no need for dental eval as she has no current known jaw breakdown and is edentulous; can do 4 mg monthly  - C1 of VRD 5/23 - 6/19  - BM Biopsy completed on 10/3/19 shows no evidence of residual myeloma   - Currently on C6D1 of Velcade, as of 10/10  - Pending SPEP, NATALY, light chains,and quant immunoglobulins  - will request follow-up with Cardiology prior to proceeding with transplant; she will also need a repeat Echo as part of pre-transplant eval  - Discussed that Neurology follow-up can be omitted given last CVA in 2011  - also check EKG and PFTs as well   -  Further scheduling per transplant coordinator     Lower extremity  edema, worse on L leg  - DVT has been ruled out, better during this visit  -continue dexmethasone to 20 mg  - continue compression stockings     Nausea/ vomiting  - controlled on zofran at first sign of nausea  - will add secondary medications for additional control too if this becomes worse or uncontrolled      Malnutrition/loss of appetite  - discussed with SW about arranging pt with boost/ensure      Anemia  - low ferritin in years past 4-8, currently is 54 on 4/30/19 while on iron supplementation  - prior GI eval in 2015 and 16  - will continue to monitor for worsening anemia and signs sx of bleeding     FUENTES  - 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 Stratton  Neurologist was Dr. Johnson, last visit in 2014  Hepatologist is Dr. Perez    Discussed with Dr. Rabago      F/u:   Additional discussions and scheduling after next visit per transplant coordinator    Genny Napoles MD  Hematology and Oncology Fellow, PGY V  [facility]

## 2019-10-11 ENCOUNTER — NUTRITION (OUTPATIENT)
Dept: NUTRITION | Facility: CLINIC | Age: 69
End: 2019-10-11
Payer: MEDICARE

## 2019-10-11 VITALS — BODY MASS INDEX: 29.33 KG/M2 | HEIGHT: 63 IN

## 2019-10-11 DIAGNOSIS — Z00.8 NUTRITIONAL ASSESSMENT: ICD-10-CM

## 2019-10-11 DIAGNOSIS — C90.00 MULTIPLE MYELOMA NOT HAVING ACHIEVED REMISSION: Primary | ICD-10-CM

## 2019-10-11 LAB
ALBUMIN SERPL ELPH-MCNC: 3.6 G/DL (ref 3.35–5.55)
ALPHA1 GLOB SERPL ELPH-MCNC: 0.38 G/DL (ref 0.17–0.41)
ALPHA2 GLOB SERPL ELPH-MCNC: 0.86 G/DL (ref 0.43–0.99)
B-GLOBULIN SERPL ELPH-MCNC: 0.85 G/DL (ref 0.5–1.1)
GAMMA GLOB SERPL ELPH-MCNC: 0.71 G/DL (ref 0.67–1.58)
INTERPRETATION SERPL IFE-IMP: NORMAL
KAPPA LC SER QL IA: 1.66 MG/DL (ref 0.33–1.94)
KAPPA LC/LAMBDA SER IA: 1.73 (ref 0.26–1.65)
LAMBDA LC SER QL IA: 0.96 MG/DL (ref 0.57–2.63)
PATHOLOGIST INTERPRETATION IFE: NORMAL
PATHOLOGIST INTERPRETATION SPE: NORMAL
PROT SERPL-MCNC: 6.4 G/DL (ref 6–8.4)

## 2019-10-11 PROCEDURE — 99999 PR PBB SHADOW E&M-EST. PATIENT-LVL III: ICD-10-PCS | Mod: PBBFAC,HCNC,,

## 2019-10-11 PROCEDURE — 99999 PR PBB SHADOW E&M-EST. PATIENT-LVL III: CPT | Mod: PBBFAC,HCNC,,

## 2019-10-11 PROCEDURE — 97802 MEDICAL NUTRITION INDIV IN: CPT | Mod: HCNC,S$GLB,, | Performed by: DIETITIAN, REGISTERED

## 2019-10-11 PROCEDURE — 97802 PR MED NUTR THER, 1ST, INDIV, EA 15 MIN: ICD-10-PCS | Mod: HCNC,S$GLB,, | Performed by: DIETITIAN, REGISTERED

## 2019-10-11 NOTE — PROGRESS NOTES
"Referring Physician:Jere Tineo MD     Reason for visit:  Chief Complaint   Patient presents with    Multiple Myeloma    stem cell transplant candidate    Weight Loss    Nutrition Counseling      Initial Visit    :1950     Allergies Reviewed  Meds Reviewed    Anthropometrics  Weight:(P) 75.1 kg (165 lb 9.1 oz)  Height:5' 3" (1.6 m)  BMI:Body mass index is 29.33 kg/m² (pended).   IBW:   52.3 kg  +/-10%    Meds:  Outpatient Medications Prior to Visit   Medication Sig Dispense Refill    acetaminophen (TYLENOL) 650 MG TbSR Take 1,300 mg by mouth daily as needed (pain).      acyclovir (ZOVIRAX) 400 MG tablet Take 1 tablet (400 mg total) by mouth 2 (two) times daily. 120 tablet 2    alirocumab (PRALUENT PEN) 75 mg/mL PnIj Inject 1 mL (75 mg total) into the skin every 14 (fourteen) days. Ask your PCP/ Oncologist 2 mL 11    allopurinol (ZYLOPRIM) 300 MG tablet Take 1 tablet (300 mg total) by mouth once daily. 30 tablet 1    amLODIPine (NORVASC) 5 MG tablet TAKE 1 TABLET BY MOUTH EVERY DAY 90 tablet 3    aspirin 81 mg Tab Take 1 tablet by mouth Daily.      bisacodyl (DULCOLAX) 5 mg EC tablet Take 5 mg by mouth once daily.      dexAMETHasone (DECADRON) 4 MG Tab Take 5 tablets (20 mg total) by mouth once a week. Take 5 tabs or 20 mg weekly, all tabs on same day as weekly Velcade injection 60 tablet 0    ferrous gluconate 324 mg (37.5 mg iron) Tab TAKE 1 TABLET BY MOUTH 2 (TWO) TIMES DAILY WITH MEALS. 180 tablet 0    gabapentin (NEURONTIN) 300 MG capsule TAKE 1 CAPSULE (300 MG TOTAL) BY MOUTH 2 (TWO) TIMES DAILY. 180 capsule 3    lidocaine (LIDODERM) 5 % Place 1 patch onto the skin once daily. Remove & Discard patch within 12 hours or as directed by MD 60 patch 0    omeprazole (PRILOSEC) 40 MG capsule TAKE 1 CAPSULE (40 MG TOTAL) BY MOUTH ONCE DAILY. 90 capsule 0    ondansetron (ZOFRAN) 4 MG tablet Take 1 tablet (4 mg total) by mouth every 6 (six) hours as needed for Nausea. 30 tablet 1    " ondansetron (ZOFRAN-ODT) 8 MG TbDL Take 1 tablet (8 mg total) by mouth every 8 (eight) hours as needed (nausea/vomiting). 60 tablet 1    potassium chloride SA (K-DUR,KLOR-CON) 20 MEQ tablet Take 1 tablet (20 mEq total) by mouth once daily for 5 days. 5 tablet 0    REVLIMID (REVLIMID) 25 mg Cap Take 1 capsule (25 mg total) by mouth once daily auth [id number] 9/20/19. 21 capsule 0    sertraline (ZOLOFT) 50 MG tablet TAKE 1 TABLET (50 MG TOTAL) BY MOUTH ONCE DAILY. 90 tablet 3    traMADol (ULTRAM) 50 mg tablet Take 1 tablet (50 mg total) by mouth 2 (two) times daily as needed for Pain. 60 tablet 3    vitamin D 1000 units Tab Take 185 mg by mouth once daily.       Facility-Administered Medications Prior to Visit   Medication Dose Route Frequency Provider Last Rate Last Dose    zoledronic acid (ZOMETA) 4 mg in sodium chloride 0.9% 100 mL IVPB  4 mg Intravenous 1 time in Clinic/HOD Genny Napoles MD           Food/Drug Interactions Noted:  n/a    Vitamins/Supplements/Herbs:  Vit D    Labs:   Alb  3.5   HgbA1c  7.4     Nutrition Prescription:   1877 Kcals/day( 25 kcal/kg),    75 g protein( 1.0 g/kg)     Support System:    Pt with very supportive family/friend.  Two good friends accompanied her today, and were also given copy of BMT Nutrition Therapy handout to share.    Diet Hx:   Pt states her family/friends make sure there is always something in her house to eat.         Current activity level and/or physical limitations:  Normal activity; awaiting BMT    Motivation to make changes/anticipated barriers and/or expected adherence:    Adherence expected; pt voiced understanding of importance of safe food handling practices    Nutrition-Focus Physical Findings:    Appears well nourished      Assessment:   Pt and her friends attentive and asked relevant questions about foods/beverages recommended & to avoid; safe food handling techniques.  All questions answered, and they all verbalized understanding of  information.    Nutrition Diagnosis:   Food- and nutrition-related knowledge deficit RT lack of prior exposure to information AEB dx multiple myeloma      Recommendations: Diet as tolerated.  Follow safe food handling practices during tx (keep cold foods cold, hot foods hot.  Wash hands well before, during, and after making & eating food.  Do not share your food with other people.  Wash raw foods well before eating them.  Cook all foods until well done.  Do not keep refrigerated leftovers for more than 3 days.  Handouts provided & reviewed:  BMT Nutrition Therapy; Food Safety Nutrition Therapy;  USDA-FDA Cold Storage Chart      Strategies Implemented:    Safe food handling practices    Consultation Time:30 minutes.  Communicated with referring healthcare provider:  Consult note available in pt's Epic chart per MD discretion  Follow Up:  Pt and her two friends provided with dietitian contact number and advised to call with questions or make future appointment if further intervention needed.

## 2019-10-11 NOTE — PATIENT INSTRUCTIONS
Diet as tolerated.  Follow safe food handling practices during tx (keep cold foods cold, hot foods hot.  Wash hands well before, during, and after making & eating food.  Do not share your food with other people.  Wash raw foods well before eating them.  Cook all foods until well done.  Do not keep refrigerated leftovers for more than 3 days.

## 2019-10-14 RX ORDER — LENALIDOMIDE 25 MG/1
CAPSULE ORAL
Qty: 21 CAPSULE | Refills: 0 | Status: SHIPPED | OUTPATIENT
Start: 2019-10-14 | End: 2019-10-18 | Stop reason: SDUPTHER

## 2019-10-15 ENCOUNTER — SOCIAL WORK (OUTPATIENT)
Dept: HEMATOLOGY/ONCOLOGY | Facility: CLINIC | Age: 69
End: 2019-10-15
Payer: MEDICARE

## 2019-10-15 ENCOUNTER — OFFICE VISIT (OUTPATIENT)
Dept: PSYCHIATRY | Facility: CLINIC | Age: 69
End: 2019-10-15
Payer: MEDICARE

## 2019-10-15 ENCOUNTER — CLINICAL SUPPORT (OUTPATIENT)
Dept: HEMATOLOGY/ONCOLOGY | Facility: CLINIC | Age: 69
End: 2019-10-15
Payer: MEDICARE

## 2019-10-15 DIAGNOSIS — C90.00 MULTIPLE MYELOMA, REMISSION STATUS UNSPECIFIED: Primary | ICD-10-CM

## 2019-10-15 DIAGNOSIS — Z76.82 STEM CELL TRANSPLANT CANDIDATE: ICD-10-CM

## 2019-10-15 DIAGNOSIS — C90.00 MULTIPLE MYELOMA NOT HAVING ACHIEVED REMISSION: ICD-10-CM

## 2019-10-15 DIAGNOSIS — F33.42 RECURRENT MAJOR DEPRESSIVE DISORDER, IN FULL REMISSION: ICD-10-CM

## 2019-10-15 DIAGNOSIS — Z01.818 PRE-TRANSPLANT EVALUATION FOR STEM CELL TRANSPLANT: Primary | ICD-10-CM

## 2019-10-15 PROCEDURE — 99999 PR PBB SHADOW E&M-EST. PATIENT-LVL II: CPT | Mod: PBBFAC,HCNC,, | Performed by: PSYCHOLOGIST

## 2019-10-15 PROCEDURE — 99999 PR PBB SHADOW E&M-EST. PATIENT-LVL III: ICD-10-PCS | Mod: PBBFAC,HCNC,,

## 2019-10-15 PROCEDURE — 99999 PR PBB SHADOW E&M-EST. PATIENT-LVL III: CPT | Mod: PBBFAC,HCNC,,

## 2019-10-15 PROCEDURE — 90791 PSYCH DIAGNOSTIC EVALUATION: CPT | Mod: HCNC,S$GLB,, | Performed by: PSYCHOLOGIST

## 2019-10-15 PROCEDURE — 99999 PR PBB SHADOW E&M-EST. PATIENT-LVL II: ICD-10-PCS | Mod: PBBFAC,HCNC,, | Performed by: PSYCHOLOGIST

## 2019-10-15 PROCEDURE — 90791 PR PSYCHIATRIC DIAGNOSTIC EVALUATION: ICD-10-PCS | Mod: HCNC,S$GLB,, | Performed by: PSYCHOLOGIST

## 2019-10-15 NOTE — PROGRESS NOTES
Psycho-Oncology Pre-Transplant Evaluation  Psychiatry Initial Visit (PhD)    Date:  10/15/2019     CPT Code: 04209 Evaluation Length (direct face-to-face time):  1.5 hours      Referred by:  BMT Team/ Oncologist: ADOLFO Tineo MD.     Chief complaint/reason for encounter:  Psychological Evaluation prior to stem cell transplantation    Clinical status of patient: Outpatient    Shelby Thompson, a 69 y.o. female, was seen for initial evaluation visit.  Met with patient and friend. Her primary care physician is Emanuel Arevalo MD.         Medical/Surgical History:   Patient Active Problem List   Diagnosis    Personal history of malignant neoplasm of breast    Mononeuritis of upper limb    Iron deficiency anemia    Hyperlipemia    Gastropathy    Tremor    Nuclear sclerosis    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    Thoracic aortic aneurysm without rupture    Aortic atherosclerosis    History of CVA with residual deficit    Osteopenia    PAD (peripheral artery disease)    Liver mass    Essential hypertension    Recurrent major depressive disorder, in full remission    Statin intolerance    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)    Pulmonary heart disease    Ectatic aorta    Tortuous aorta    Acute on chronic diastolic heart failure    Budd-Chiari syndrome    PVC (premature ventricular contraction)    Acute anemia    FUENTES (acute kidney injury)    Gammopathy    Hyperglycemia    Multiple myeloma not having achieved remission    Metastatic multiple myeloma to bone    Multiple myeloma        Health Behaviors:       ETOH Use: No (past social and within NIAAA healthy use limits for age and gender)      Tobacco Use: No (10 pack year history)     Illicit Drug Use:  No     Prescription Misuse:No   Caffeine: none   Exercise:The patient engaged in regular activity prior to illness  (rode stationary bike daily)   Advanced directives:No     Family  History:   Psychiatric illness: No     Alcohol/Drug Abuse: No     Suicide: No      Past Psychiatric History:   Inpatient treatment: No     Outpatient treatment: No (adjunctive treatment while in rehab post CVA)    Prior substance abuse treatment: No     Suicide Attempts: No      Psychotropic Medications:  Current: Zoloft (since 2016)      Past: possible other antidepressant, could not recall; likely Desyrel (Dr. Arevalo, [id number])    Current medications as per below, allergies reviewed in chart.  Current Outpatient Medications   Medication    acetaminophen (TYLENOL) 650 MG TbSR    acyclovir (ZOVIRAX) 400 MG tablet    alirocumab (PRALUENT PEN) 75 mg/mL PnIj    allopurinol (ZYLOPRIM) 300 MG tablet    amLODIPine (NORVASC) 5 MG tablet    aspirin 81 mg Tab    bisacodyl (DULCOLAX) 5 mg EC tablet    dexAMETHasone (DECADRON) 4 MG Tab    ferrous gluconate 324 mg (37.5 mg iron) Tab    gabapentin (NEURONTIN) 300 MG capsule    lidocaine (LIDODERM) 5 %    omeprazole (PRILOSEC) 40 MG capsule    ondansetron (ZOFRAN) 4 MG tablet    ondansetron (ZOFRAN-ODT) 8 MG TbDL    potassium chloride SA (K-DUR,KLOR-CON) 20 MEQ tablet    REVLIMID 25 mg Cap    sertraline (ZOLOFT) 50 MG tablet    traMADol (ULTRAM) 50 mg tablet    vitamin D 1000 units Tab     Current Facility-Administered Medications   Medication Frequency    zoledronic acid (ZOMETA) 4 mg in sodium chloride 0.9% 100 mL IVPB 1 time in Clinic/HOD        CAM Therapies: none     Social situation/Stressors:Shelby Thompson is an 69 y.o. female referred by ADOLFO Tineo MD for pre-transplant evaluation.  Shelby Thompson lives with her  (Moises) in [place], [place]. She is a retired  (retired 2011 after CVA).  Her prior work history is stable.  Shelby Thompson has 2 adult children (Delia-Liberty, Guillermo-Malik), 7 grandchildren and 1 great-grandchild (currently 10 months).  Her spouse is also retired.  Mrs. Thompson was reared in a  "family with 13 siblings. She remains close to several of her sisters (Laurita Lopez). The patient reports excellent social support from her daughter, granddaughters, and a number of lifelong friends (known "over 50 years"). Her friend (Harriet Moreno) will be the coordinator of her caregivers. Several family members will be present and available to assist the patient during her recovery period.   Shelby Thompson is an active member of the [employer]. Shelby Thompson's hobbies include travel and following [employer] football.   The patient has no  history.    Additional stressors:  's recent health difficulties (5 prior cardiac incidents); ongoing deficits due to historical CVA (2003, 2011; cannot drive, unsteady gait, cannot balance checkbook, cannot write with dominant hand)     Strengths: Housing stability, Able to vocalize needs, Values and traditions, Motivation, readiness for change, Setting and pursuing goals, hopes, dreams, aspirations, Resources - social, interpersonal, monetary and Interpersonal relationships and supports available - family, relatives, friends   Liabilities: Complicated medical illness, 's medical illness    History of present illness:      Multiple myeloma not having achieved remission    5/9/2019 Initial Diagnosis     Multiple myeloma not having achieved remission      5/16/2019 - 5/16/2019 Chemotherapy     Treatment Summary   Plan Name: OP MYELOMA BORTEZOMIB Q2W  Treatment Goal: Control  Status: Inactive  Start Date: [No treatment day found]  End Date: [No treatment day found]  Provider: Jere Tineo MD  Chemotherapy: bortezomib (VELCADE) injection 2.4 mg, 1.3 mg/m2, Subcutaneous, Clinic/HOD 1 time, 0 of 52 cycles      5/16/2019 -  Chemotherapy     Treatment Summary   Plan Name: OP BORTEZOMIB BACKBONE DAYS 1, 8, 15, 22 (28-DAY CYCLE)  Treatment Goal: Control  Status: Active  Start Date: 5/23/2019  End Date: 12/19/2019 (Planned)  Provider: Bebeto JOHNSTON" MD Blas  Chemotherapy: bortezomib (VELCADE) injection 2.4 mg, 1.3 mg/m2 = 2.4 mg (100 % of original dose 1.3 mg/m2), Subcutaneous, Clinic/HOD 1 time, 7 of 9 cycles  Dose modification: 1.3 mg/m2 (original dose 1.3 mg/m2, Cycle 1)  Administration: 2.4 mg (5/23/2019), 2.4 mg (5/30/2019), 2.4 mg (6/6/2019), 2.4 mg (6/13/2019), 2.4 mg (6/20/2019), 2.4 mg (7/5/2019), 2.4 mg (6/27/2019), 2.4 mg (7/11/2019), 2.4 mg (7/18/2019), 2.4 mg (7/25/2019), 2.4 mg (8/1/2019), 2.4 mg (8/8/2019), 2.4 mg (8/15/2019), 2.4 mg (8/22/2019), 2.4 mg (8/29/2019), 2.4 mg (9/5/2019), 2.4 mg (9/12/2019), 2.4 mg (9/19/2019), 2.4 mg (9/26/2019), 2.4 mg (10/3/2019)         Shelby Thompson has adjusted to illness fairly well primarily through active coping strategies, focus on alternative activities and prayer. She has engaged in appropriate information gathering.  The patient has excellent family/friend support.  Her family is coping well with the diagnosis/treatment/prognosis.  Patient's nephew was diagnosed with multiple myeloma and underwent BMT several years ago.    Shelby Thompson reports using prayer and time with family and friends  as her primary methods of coping with general stressors.  Illness-related psychosocial stressors include difficulty meeting family responsibilities and changes in ability to engage in leisure activities. These stressors will not prevent patient from adhering to post-transplant requirements.  The patient has an excellent partnership with her [facility] [place] team. The patient reports the following barriers to cancer care:none (transportation assistance provided by numerous family and friends).    Stem Cell Transplantation (SCT):  Shelby Thompson possesses a satisfactory level of knowledge about SCT gleaned from discussions with her clinical team and contact with other patients (her nephew and his caregivers).  Shelby Thompson is knowledgeable about the possible costs, risks, and complications of the  procedure and the behavioral changes which will be required of her.  She has anticipated her recovery needs and has planned assistance from several family members to facilitate healing. Her caregivers include the following:  Delia Carroll (daughter)  Elda Hatfield (granddaughter)  Loreta Boles (granddaughter)  Laurita Stewart (sister)  Jessica Rose (sister)  Kirsty Thompson   (Harriet Moreno will coordinate caregivers)        Shelby Thompson is aware of the requirement that HSCT patients must stay within 1 hour of the hospital for their first 30 days post-transplant.  Shelby Thompson knows she must commit to careful monitoring of symptoms, the possibility of a complex long-term multiple drug treatment regimen, and long term follow-up visits with her oncologist (as required) following the procedure.  She is aware of the following necessary behavioral changes:changes in food selection, preparation, and storage, increased vigilance with home cleanliness , careful personal and dental hygiene  and rapid return to physical activity. The patient reports good compliance with medical treatment in the past, which is supported by review of her medical chart.  Shelby Thompson has realistic expectations of health and illness possibilities following SCT. She is aware of possible medical side effects including infection, organ toxicity/failure , hair loss, GI difficulties , fatigue  and neurocognitive changes  during/following treatment. She is aware of the risk of mortality. She also anticipates social strains including decreased ability to participate in some leisure and social activities for some time and the strains of care-giving demands on friends/family.      Psychological Screening:   Distress thermometer:   Distress Score    Distress Score: 2        Practical Problems Physical Problems                                                   Family Problems                                         Emotional Problems                                                          Spiritual/Religions Concerns               Other Problems            · Mood: currently under good control;  prior depression: since second CVA, adequately treated with Zoloft and no SI/HI;   · Sada: Denies   · Psychosis: Denies  · Anxiety: denied; no prior;    · Generalized anxiety: Denies       · Panic Disorder: Denies  · Social/specific phobia: Denies   · OCD: Denies  · Substance abuse: denied  · Is the patient willing to submit to a random drug screen?  Yes  · Cognitive functioning: memory difficulties since CVA  · Health behaviors: noncontributory  · Can the patient identify own medications and describe purpose and proper dosing? No (requires assistance since CVA); Patient does use compensatory strategies to ensure medication adherence (daily pill reminder box, alarm on phone to prompt medication taking)  · Does the patient appropriately manage chronic conditions which need close monitoring (such as diabetes)? Yes  · Does the patient use complementary or alternative medications, remedies, procedures, or interventions? No   · Sleep: Sleeps 7-8 hours/day in 2 divided periods, 3-4 hours at night, 4 hour nap;  interrupted sleep (due to diarrhea, nocturia), no sleep onset difficulty , no reported apneic events and no restless legs, no caffeine/stimulants  no use of OTC/melatonin/hypnotics/benzodiazepines    · Sexual Dysfunction:  Denies   · Head Injury History: CVA in 2003 and in 2011  · Personality Functioning: The patient does not display any personality   characteristics which would be an impediment to receiving BMT.      Mental Status Exam:    General appearance:  appears stated age, neatly dressed, well groomed  Level of cooperation:  cooperative  Thought processes:  logical, goal-directed   Speech: normal in volume and tone, moderate hesitation due to word-finding difficulties    Mood: euthymic  Affect: mood congruent, full range and appropriate  Thought  content:  no illusions, no visual hallucinations, no auditory hallucinations, no delusions, no active or passive homicidal thoughts, no active or passive suicidal ideation, no obsessions, no compulsions, no violence  Orientation:  oriented to person, place, and time  Memory:  Recent memory:  5/5 items on immediate recall, 0 of 5 objects after brief delay.    Remote memory - intact  Attention span and concentration:  spelled WORLD forwards and backwards; SAVEAHAART without difficulty  Abstract reasoning:    Similarities: abstract.    Proverbs: abstract.  Judgment and insight: fair  Language:  Intact    SLUMS:  The [employer] Mental Status Examination (UMS), a cognitive screener, was administered to assess the Patient's current mental status and cognitive capacity. Patient obtained a score of 23/30. This score does not fall within normal limits as compared to those within similar age and level of education, and suggests moderate impairment in neurocognitive functioning (present since 2013 stroke, by patient and friend report).      REALM-R:  Sufficient health literacy      SUMMARY AND RECOMMENDATIONS:   Shelby Thompson is a  69 y.o. female referred by Dr. Tineo for psychological evaluation prior to stem cell transplantation.   The patient appears absent of disabling psychopathology or disabilities which would prevent understanding and compliance with medical treatment. Her depression is well-controlled on her current medication regimen.  There is no evidence of suicidality.    The patient has satisfactory knowledge about HSCT, appropriate expectations for health and illness following transplantation, adequate understanding of the possible risks and complications of this treatment option, and a high willingness to sustain effort for lifestyle changes and health adaptations which will be required of her. She is aware of the 30 day 1 hour residence requirement.   She reports adequate compliance  with previous medical treatment.   Shelby Thompson has excellent social support from a large group of close family and friends. Caregivers are engaged and aware of post-HSCT demands.  Due to her 's illness (currentlyh hospitalized [place] [facility], her extended group of caregivers will be monitored and coordinated by her friend, Harriet Moreno.    The patient exhibits a high degree of social stability.   The patient acknowledges no stressors expected to limit her ability to cope with the demands of HSCT and recovery.  Her 's illness is concerning, but patient feels confident in her ability to prioritize her own needs and allow other family members to assist her .   The patient reports no tobacco use, no alcohol use, no illicit drug use and no caffeine consumption   She demonstrates adequate health literacy.        Impressions:  Shelby Thompson is an acceptable HSCT candidate from a psychological perspective.   There are no overt psychological contraindications for proceeding with the procedure.Her psychological symptoms are well-managed on his current psychotropic regimen. The patient has reasonable expectations for the procedure, good social support, and has already begun making appropriate life plans in anticipation of the procedure. The patient has verbalized appropriate awareness and commitment to the necessary behavioral changes associated with HSCTand appears willing to adjust to long-term lifestyle challenges and medical follow-up. The patient will be seen by me while inpatient to facilitate adjustment due to her 's concurrent illness.  The patient will continue on her current psychotropic medication regimen, as per her medical team.  The patient and her caregivers are aware of the availability of additional support, as needed, during this process.     Ilya Jorge, PhD  Clinical Psychologist  [place] License #[id number]

## 2019-10-15 NOTE — LETTER
October 15, 2019        Jere Tineo MD  [address] [facility] [address] [address]             [facility]  [address] [facility] [address]  Phone: [phone]  Fax: [phone]   Patient: Shelby Thompson   MR Number: [id number]   YOB: 1950   Date of Visit: 10/15/2019       Dear Dr. Tineo:    Thank you for referring Shelby Thompson to me for evaluation. Below are the relevant portions of my assessment and plan of care.    Shelby Thompson is a  69 y.o. female referred by Dr. Tineo for psychological evaluation prior to stem cell transplantation.   The patient appears absent of disabling psychopathology or disabilities which would prevent understanding and compliance with medical treatment. Her depression is well-controlled on her current medication regimen.  There is no evidence of suicidality.    The patient has satisfactory knowledge about HSCT, appropriate expectations for health and illness following transplantation, adequate understanding of the possible risks and complications of this treatment option, and a high willingness to sustain effort for lifestyle changes and health adaptations which will be required of her. She is aware of the 30 day 1 hour residence requirement.   She reports adequate compliance with previous medical treatment.   Shelby Thompson has excellent social support from a large group of close family and friends. Caregivers are engaged and aware of post-HSCT demands.  Due to her 's illness (currentlyh hospitalized [place] [facility], her extended group of caregivers will be monitored and coordinated by her friend, Harriet Moreno.    The patient exhibits a high degree of social stability.   The patient acknowledges no stressors expected to limit her ability to cope with the demands of HSCT and recovery.  Her 's illness is concerning, but patient feels confident in her ability to prioritize her own needs and allow  other family members to assist her .   The patient reports no tobacco use, no alcohol use, no illicit drug use and no caffeine consumption   She demonstrates adequate health literacy.        Impressions:  Shelby Thompson is an acceptable HSCT candidate from a psychological perspective.   There are no overt psychological contraindications for proceeding with the procedure.Her psychological symptoms are well-managed on his current psychotropic regimen. The patient has reasonable expectations for the procedure, good social support, and has already begun making appropriate life plans in anticipation of the procedure. The patient has verbalized appropriate awareness and commitment to the necessary behavioral changes associated with HSCTand appears willing to adjust to long-term lifestyle challenges and medical follow-up. The patient will be seen by me while inpatient to facilitate adjustment due to her 's concurrent illness.  The patient will continue on her current psychotropic medication regimen, as per her medical team.  The patient and her caregivers are aware of the availability of additional support, as needed, during this process.    If you have questions, please do not hesitate to call me. I look forward to following Shelby along with you.    Sincerely,      Ilya Jorge, PhD           CC  Williams Mir, RN  Grcaiela Frausto, [facility] [place]

## 2019-10-15 NOTE — PROGRESS NOTES
BMT Pharmacist Evaluation      Current Outpatient Medications:     acyclovir (ZOVIRAX) 400 MG tablet, Take 1 tablet (400 mg total) by mouth 2 (two) times daily., Disp: 120 tablet, Rfl: 2    alirocumab (PRALUENT PEN) 75 mg/mL PnIj, Inject 1 mL (75 mg total) into the skin every 14 (fourteen) days. Ask your PCP/ Oncologist, Disp: 2 mL, Rfl: 11    allopurinol (ZYLOPRIM) 300 MG tablet, Take 1 tablet (300 mg total) by mouth once daily., Disp: 30 tablet, Rfl: 1    amLODIPine (NORVASC) 5 MG tablet, TAKE 1 TABLET BY MOUTH EVERY DAY, Disp: 90 tablet, Rfl: 3    aspirin 81 mg Tab, Take 1 tablet by mouth Daily., Disp: , Rfl:     dexAMETHasone (DECADRON) 4 MG Tab, Take 5 tablets (20 mg total) by mouth once a week. Take 5 tabs or 20 mg weekly, all tabs on same day as weekly Velcade injection, Disp: 60 tablet, Rfl: 0    ferrous gluconate 324 mg (37.5 mg iron) Tab, TAKE 1 TABLET BY MOUTH 2 (TWO) TIMES DAILY WITH MEALS., Disp: 180 tablet, Rfl: 0    gabapentin (NEURONTIN) 300 MG capsule, TAKE 1 CAPSULE (300 MG TOTAL) BY MOUTH 2 (TWO) TIMES DAILY., Disp: 180 capsule, Rfl: 3    lidocaine (LIDODERM) 5 %, Place 1 patch onto the skin once daily. Remove & Discard patch within 12 hours or as directed by MD, Disp: 60 patch, Rfl: 0    omeprazole (PRILOSEC) 40 MG capsule, TAKE 1 CAPSULE (40 MG TOTAL) BY MOUTH ONCE DAILY., Disp: 90 capsule, Rfl: 0    ondansetron (ZOFRAN) 4 MG tablet, Take 1 tablet (4 mg total) by mouth every 6 (six) hours as needed for Nausea., Disp: 30 tablet, Rfl: 1    ondansetron (ZOFRAN-ODT) 8 MG TbDL, Take 1 tablet (8 mg total) by mouth every 8 (eight) hours as needed (nausea/vomiting)., Disp: 60 tablet, Rfl: 1    potassium chloride SA (K-DUR,KLOR-CON) 20 MEQ tablet, Take 1 tablet (20 mEq total) by mouth once daily for 5 days., Disp: 5 tablet, Rfl: 0    REVLIMID 25 mg Cap, TAKE 1 CAPSULE BY MOUTH EVERY DAY, Disp: 21 capsule, Rfl: 0    sertraline (ZOLOFT) 50 MG tablet, TAKE 1 TABLET (50 MG TOTAL) BY MOUTH ONCE  DAILY., Disp: 90 tablet, Rfl: 3    traMADol (ULTRAM) 50 mg tablet, Take 1 tablet (50 mg total) by mouth 2 (two) times daily as needed for Pain., Disp: 60 tablet, Rfl: 3    vitamin D 1000 units Tab, Take 185 mg by mouth once daily., Disp: , Rfl:     acetaminophen (TYLENOL) 650 MG TbSR, Take 1,300 mg by mouth daily as needed (pain)., Disp: , Rfl:     bisacodyl (DULCOLAX) 5 mg EC tablet, Take 5 mg by mouth once daily., Disp: , Rfl:     Current Facility-Administered Medications:     zoledronic acid (ZOMETA) 4 mg in sodium chloride 0.9% 100 mL IVPB, 4 mg, Intravenous, 1 time in Clinic/HOD, Genny Napoles MD      Review of patient's allergies indicates:   Allergen Reactions    Lipitor [atorvastatin]      Itching, elevated cpk, muscle aches, statin induced myositis         Estimated Creatinine Clearance: 63.5 mL/min (based on SCr of 0.8 mg/dL).       Medication adherence: good, takes all meds at 10am and 9pm; even has an alarm set to reminder her  Medication-related problems: losartan (didn't feel well)     Planned conditioning regimen:  Melphalan on Day -1    Antimicrobial Prophylaxis:  Acyclovir starting on Day -1  Levofloxacin starting on Day -1  Fluconazole starting on Day -1    Growth Factor Support:  Neupogen starting on Day +7      Caregiver: daughters, sister, grandaughters  Post-transplant discharge plans: home     Notes:  Reviewed and reconciled the medication list with the patient and daughter. Patient was able to confirm all medications she currently takes including schedule and indication. Patient demonstrates excellent medication adherence and understands the importance of this through the transplant process.     Reviewed the planned high-dose chemotherapy regimen, including schedule and possible side effects. Provided the patient with drug information handouts for chemotherapy. Reviewed possible side effects of transplant including: neutropenia, thrombocytopenia, anemia, infection, infusion  reactions, nausea/vomiting, mucositis, loss of appetite, taste changes, diarrhea,hair loss, liver and/or renal dysfunction. Reviewed prophylactic antimicrobials, as well as prophylactic and as needed antiemetics. Encouraged the patient to report all possible side effects/new symptoms and to ask for supportive care medications if needed. Patient verbalized understanding and all questions were answered.     Proposed recommendations:  The patient demonstrates good medication adherence and understanding of the chemotherapy and transplant plan. BMT/Hematology Oncology PharmD will continue to follow the patient while admitted to the inpatient unit.    Notes:   - Allopurinol can be stopped at admission and at discharge as patient does not have a diagnosis of gout and it is being used for TLS prophylaxis.    - It was explained to the patient that alirocumab will be held during the transplant and can be restarted in clinic at the physician's discretion.   - Aspirin can be stopped at discharge.     - The patient understands that pantoprazole will be used in place of omeprazole during admission, but she can restart the omeprazole at discharge if still needed.    - Iron and vitamin D should be held prior to chemotherapy, but can be restarted again at discharge.       Jessica Zuniga, Pharm.D., BCOP  Clinical Pharmacy Specialist, Bone Marrow Transplant  [facility]  SpectraLink: [id number]

## 2019-10-16 ENCOUNTER — HOSPITAL ENCOUNTER (OUTPATIENT)
Dept: CARDIOLOGY | Facility: CLINIC | Age: 69
Discharge: HOME | End: 2019-10-16
Attending: INTERNAL MEDICINE
Payer: MEDICARE

## 2019-10-16 ENCOUNTER — HOSPITAL ENCOUNTER (OUTPATIENT)
Dept: PULMONOLOGY | Facility: CLINIC | Age: 69
Discharge: HOME | End: 2019-10-16
Payer: MEDICARE

## 2019-10-16 ENCOUNTER — HOSPITAL ENCOUNTER (OUTPATIENT)
Dept: CARDIOLOGY | Facility: CLINIC | Age: 69
Discharge: HOME | End: 2019-10-16
Payer: MEDICARE

## 2019-10-16 ENCOUNTER — HOSPITAL ENCOUNTER (OUTPATIENT)
Dept: RADIOLOGY | Facility: HOSPITAL | Age: 69
Discharge: HOME | End: 2019-10-16
Attending: INTERNAL MEDICINE
Payer: MEDICARE

## 2019-10-16 VITALS
DIASTOLIC BLOOD PRESSURE: 62 MMHG | HEART RATE: 70 BPM | WEIGHT: 165 LBS | BODY MASS INDEX: 29.23 KG/M2 | HEIGHT: 63 IN | SYSTOLIC BLOOD PRESSURE: 142 MMHG

## 2019-10-16 DIAGNOSIS — Z76.82 STEM CELL TRANSPLANT CANDIDATE: ICD-10-CM

## 2019-10-16 DIAGNOSIS — R06.02 SHORTNESS OF BREATH ON EXERTION: ICD-10-CM

## 2019-10-16 DIAGNOSIS — C90.00 MULTIPLE MYELOMA, REMISSION STATUS UNSPECIFIED: ICD-10-CM

## 2019-10-16 DIAGNOSIS — C90.00 MULTIPLE MYELOMA, REMISSION STATUS UNSPECIFIED: Primary | ICD-10-CM

## 2019-10-16 LAB
ASCENDING AORTA: 3.81 CM
AV INDEX (PROSTH): 0.54
AV MEAN GRADIENT: 7 MMHG
AV PEAK GRADIENT: 13 MMHG
AV VALVE AREA: 1.71 CM2
AV VELOCITY RATIO: 0.48
BSA FOR ECHO PROCEDURE: 1.82 M2
CV ECHO LV RWT: 0.37 CM
DLCO ADJ PRE: 10.55 ML/(MIN*MMHG) (ref 14.92–26.39)
DLCO SINGLE BREATH LLN: 14.92
DLCO SINGLE BREATH PRE REF: 44.1 %
DLCO SINGLE BREATH REF: 20.66
DLCOC SBVA LLN: 2.83
DLCOC SBVA PRE REF: 91.5 %
DLCOC SBVA REF: 4.33
DLCOC SINGLE BREATH LLN: 14.92
DLCOC SINGLE BREATH PRE REF: 51.1 %
DLCOC SINGLE BREATH REF: 20.66
DLCOCSBVAULN: 5.83
DLCOCSINGLEBREATHULN: 26.39
DLCOSINGLEBREATHULN: 26.39
DLCOVA LLN: 2.83
DLCOVA PRE REF: 79.1 %
DLCOVA PRE: 3.42 ML/(MIN*MMHG*L) (ref 2.83–5.83)
DLCOVA REF: 4.33
DLCOVAULN: 5.83
DLVAADJ PRE: 3.96 ML/(MIN*MMHG*L) (ref 2.83–5.83)
DOP CALC AO PEAK VEL: 1.78 M/S
DOP CALC AO VTI: 36.3 CM
DOP CALC LVOT AREA: 3.2 CM2
DOP CALC LVOT DIAMETER: 2.01 CM
DOP CALC LVOT PEAK VEL: 0.86 M/S
DOP CALC LVOT STROKE VOLUME: 62.13 CM3
DOP CALCLVOT PEAK VEL VTI: 19.59 CM
E WAVE DECELERATION TIME: 204.98 MSEC
E/A RATIO: 0.91
E/E' RATIO: 23.33 M/S
ECHO LV POSTERIOR WALL: 0.92 CM (ref 0.6–1.1)
FEF 25 75 LLN: 0.6
FEF 25 75 PRE REF: 87.5 %
FEF 25 75 REF: 1.6
FEV05 LLN: 0.8
FEV05 REF: 1.65
FEV1 FVC LLN: 66
FEV1 FVC PRE REF: 100.1 %
FEV1 FVC REF: 79
FEV1 LLN: 1.27
FEV1 PRE REF: 79.5 %
FEV1 REF: 1.83
FRACTIONAL SHORTENING: 38 % (ref 28–44)
FVC LLN: 1.65
FVC PRE REF: 79 %
FVC REF: 2.34
INTERVENTRICULAR SEPTUM: 0.84 CM (ref 0.6–1.1)
IVC PRE: 1.67 L (ref 1.65–3.03)
IVC SINGLE BREATH LLN: 1.65
IVC SINGLE BREATH PRE REF: 71.2 %
IVC SINGLE BREATH REF: 2.34
IVCSINGLEBREATHULN: 3.03
IVRT: 0.14 MSEC
LA MAJOR: 5.53 CM
LA MINOR: 5.57 CM
LA WIDTH: 4.88 CM
LEFT ATRIUM SIZE: 3.81 CM
LEFT ATRIUM VOLUME INDEX: 49.2 ML/M2
LEFT ATRIUM VOLUME: 87.71 CM3
LEFT INTERNAL DIMENSION IN SYSTOLE: 3.1 CM (ref 2.1–4)
LEFT VENTRICLE DIASTOLIC VOLUME INDEX: 65.39 ML/M2
LEFT VENTRICLE DIASTOLIC VOLUME: 116.53 ML
LEFT VENTRICLE MASS INDEX: 85 G/M2
LEFT VENTRICLE SYSTOLIC VOLUME INDEX: 21.3 ML/M2
LEFT VENTRICLE SYSTOLIC VOLUME: 37.88 ML
LEFT VENTRICULAR INTERNAL DIMENSION IN DIASTOLE: 4.97 CM (ref 3.5–6)
LEFT VENTRICULAR MASS: 152.07 G
LV LATERAL E/E' RATIO: 21 M/S
LV SEPTAL E/E' RATIO: 26.25 M/S
MV PEAK A VEL: 1.15 M/S
MV PEAK E VEL: 1.05 M/S
PEF LLN: 2.74
PEF PRE REF: 95 %
PEF REF: 4.68
PISA TR MAX VEL: 3.07 M/S
PRE DLCO: 9.12 ML/(MIN*MMHG) (ref 14.92–26.39)
PRE FEF 25 75: 1.4 L/S (ref 0.6–2.6)
PRE FET 100: 6.88 SEC
PRE FEV05 REF: 76.5 %
PRE FEV1 FVC: 78.67 % (ref 65.63–91.63)
PRE FEV1: 1.45 L (ref 1.27–2.38)
PRE FEV5: 1.26 L (ref 0.8–2.51)
PRE FVC: 1.85 L (ref 1.65–3.03)
PRE PEF: 4.45 L/S (ref 2.74–6.63)
PULM VEIN S/D RATIO: 1.23
PV PEAK D VEL: 0.48 M/S
PV PEAK S VEL: 0.59 M/S
RA MAJOR: 5.08 CM
RA PRESSURE: 3 MMHG
RA WIDTH: 3.34 CM
RIGHT VENTRICULAR END-DIASTOLIC DIMENSION: 3.97 CM
RV TISSUE DOPPLER FREE WALL SYSTOLIC VELOCITY 1 (APICAL 4 CHAMBER VIEW): 15.42 CM/S
SINUS: 3.29 CM
STJ: 2.97 CM
TDI LATERAL: 0.05 M/S
TDI SEPTAL: 0.04 M/S
TDI: 0.05 M/S
TR MAX PG: 38 MMHG
TRICUSPID ANNULAR PLANE SYSTOLIC EXCURSION: 2.1 CM
TV REST PULMONARY ARTERY PRESSURE: 41 MMHG
VA PRE: 2.67 L (ref 4.62–4.62)
VA SINGLE BREATH LLN: 4.62
VA SINGLE BREATH PRE REF: 57.7 %
VA SINGLE BREATH REF: 4.62
VASINGLEBREATHULN: 4.62

## 2019-10-16 PROCEDURE — 94010 BREATHING CAPACITY TEST: CPT | Mod: HCNC,S$GLB,, | Performed by: INTERNAL MEDICINE

## 2019-10-16 PROCEDURE — 93005 EKG 12-LEAD: ICD-10-PCS | Mod: HCNC,S$GLB,, | Performed by: INTERNAL MEDICINE

## 2019-10-16 PROCEDURE — 71046 XR CHEST PA AND LATERAL: ICD-10-PCS | Mod: 26,HCNC,, | Performed by: RADIOLOGY

## 2019-10-16 PROCEDURE — 71046 X-RAY EXAM CHEST 2 VIEWS: CPT | Mod: 26,HCNC,, | Performed by: RADIOLOGY

## 2019-10-16 PROCEDURE — 71046 X-RAY EXAM CHEST 2 VIEWS: CPT | Mod: TC,HCNC,FY

## 2019-10-16 PROCEDURE — 93306 TTE W/DOPPLER COMPLETE: CPT | Mod: HCNC,S$GLB,, | Performed by: INTERNAL MEDICINE

## 2019-10-16 PROCEDURE — 94729 DIFFUSING CAPACITY: CPT | Mod: HCNC,S$GLB,, | Performed by: INTERNAL MEDICINE

## 2019-10-16 PROCEDURE — 94729 PR C02/MEMBANE DIFFUSE CAPACITY: ICD-10-PCS | Mod: HCNC,S$GLB,, | Performed by: INTERNAL MEDICINE

## 2019-10-16 PROCEDURE — 93306 ECHO (CUPID ONLY): ICD-10-PCS | Mod: HCNC,S$GLB,, | Performed by: INTERNAL MEDICINE

## 2019-10-16 PROCEDURE — 93005 ELECTROCARDIOGRAM TRACING: CPT | Mod: HCNC,S$GLB,, | Performed by: INTERNAL MEDICINE

## 2019-10-16 PROCEDURE — 93010 EKG 12-LEAD: ICD-10-PCS | Mod: HCNC,S$GLB,, | Performed by: INTERNAL MEDICINE

## 2019-10-16 PROCEDURE — 94010 BREATHING CAPACITY TEST: ICD-10-PCS | Mod: HCNC,S$GLB,, | Performed by: INTERNAL MEDICINE

## 2019-10-16 PROCEDURE — 93010 ELECTROCARDIOGRAM REPORT: CPT | Mod: HCNC,S$GLB,, | Performed by: INTERNAL MEDICINE

## 2019-10-17 ENCOUNTER — INFUSION (OUTPATIENT)
Dept: INFUSION THERAPY | Facility: HOSPITAL | Age: 69
End: 2019-10-17
Attending: INTERNAL MEDICINE
Payer: MEDICARE

## 2019-10-17 VITALS — DIASTOLIC BLOOD PRESSURE: 72 MMHG | SYSTOLIC BLOOD PRESSURE: 135 MMHG | TEMPERATURE: 98 F | HEART RATE: 62 BPM

## 2019-10-17 DIAGNOSIS — C90.00 MULTIPLE MYELOMA NOT HAVING ACHIEVED REMISSION: Primary | ICD-10-CM

## 2019-10-17 DIAGNOSIS — R94.2 ABNORMAL PFT: Primary | ICD-10-CM

## 2019-10-17 DIAGNOSIS — Z76.82 STEM CELL TRANSPLANT CANDIDATE: ICD-10-CM

## 2019-10-17 DIAGNOSIS — C90.00 MULTIPLE MYELOMA, REMISSION STATUS UNSPECIFIED: ICD-10-CM

## 2019-10-17 PROCEDURE — 96413 CHEMO IV INFUSION 1 HR: CPT | Mod: HCNC

## 2019-10-17 PROCEDURE — 96401 CHEMO ANTI-NEOPL SQ/IM: CPT

## 2019-10-17 PROCEDURE — 63600175 PHARM REV CODE 636 W HCPCS: Mod: HCNC | Performed by: INTERNAL MEDICINE

## 2019-10-17 PROCEDURE — 96367 TX/PROPH/DG ADDL SEQ IV INF: CPT | Mod: HCNC

## 2019-10-17 PROCEDURE — 63600175 PHARM REV CODE 636 W HCPCS: Mod: JG,HCNC | Performed by: INTERNAL MEDICINE

## 2019-10-17 RX ORDER — SODIUM CHLORIDE 0.9 % (FLUSH) 0.9 %
10 SYRINGE (ML) INJECTION
Status: DISCONTINUED | OUTPATIENT
Start: 2019-10-17 | End: 2019-10-17 | Stop reason: HOSPADM

## 2019-10-17 RX ORDER — SODIUM CHLORIDE 0.9 % (FLUSH) 0.9 %
10 SYRINGE (ML) INJECTION
Status: CANCELLED | OUTPATIENT
Start: 2019-10-17

## 2019-10-17 RX ORDER — HEPARIN 100 UNIT/ML
500 SYRINGE INTRAVENOUS
Status: CANCELLED | OUTPATIENT
Start: 2019-10-17

## 2019-10-17 RX ORDER — BORTEZOMIB 3.5 MG/1
1.3 INJECTION, POWDER, LYOPHILIZED, FOR SOLUTION INTRAVENOUS; SUBCUTANEOUS
Status: COMPLETED | OUTPATIENT
Start: 2019-10-17 | End: 2019-10-17

## 2019-10-17 RX ADMIN — BORTEZOMIB 2.4 MG: 3.5 INJECTION, POWDER, LYOPHILIZED, FOR SOLUTION INTRAVENOUS; SUBCUTANEOUS at 12:10

## 2019-10-17 RX ADMIN — SODIUM CHLORIDE 4 MG: 9 INJECTION, SOLUTION INTRAVENOUS at 11:10

## 2019-10-17 NOTE — PLAN OF CARE
Problem: Adult Inpatient Plan of Care  Goal: Optimal Comfort and Wellbeing  Intervention: Provide Person-Centered Care  Flowsheets (Taken 10/17/2019 1251)  Trust Relationship/Rapport: care explained; choices provided; empathic listening provided; emotional support provided; questions answered; questions encouraged; reassurance provided; thoughts/feelings acknowledged

## 2019-10-17 NOTE — PLAN OF CARE
Pt tolerated Zometa and Velcade injection to abdomen today. NAD. PIV flushed and Removed.AVS given to pt. Discharged home. Ambulated independently.

## 2019-10-18 ENCOUNTER — PES CALL (OUTPATIENT)
Dept: ADMINISTRATIVE | Facility: CLINIC | Age: 69
End: 2019-10-18

## 2019-10-18 DIAGNOSIS — C90.00 MULTIPLE MYELOMA, REMISSION STATUS UNSPECIFIED: ICD-10-CM

## 2019-10-21 ENCOUNTER — PATIENT MESSAGE (OUTPATIENT)
Dept: HEMATOLOGY/ONCOLOGY | Facility: CLINIC | Age: 69
End: 2019-10-21

## 2019-10-21 RX ORDER — LENALIDOMIDE 25 MG/1
1 CAPSULE ORAL DAILY
Qty: 21 CAPSULE | Refills: 0 | Status: SHIPPED | OUTPATIENT
Start: 2019-10-21 | End: 2019-11-04 | Stop reason: SDUPTHER

## 2019-10-21 NOTE — PROGRESS NOTES
[facility]   Bone Marrow Transplant Psychosocial Assessment   Date: 10/15/2019       Demographic Information     Name: Shelby Thompson    : 1950    Age: 69 y.o.    Sex: female    Race: Black or     Marital Status:    SS #:     Phone Number(s): [phone] (home)     Home Address: [address] [place] [address]    Mailing Address: [address] [place] [address]    Are you a [place] Citizen? Yes   If no, please explain:        Contact Information     Next of Kin: Moises Thompson   Relationship: spouse   Phone Number(s): [phone]   Emergency Contact: Extended Emergency Contact Information  Primary Emergency Contact: Moises Thompson  Address: [address] [place] [address]  Home Phone: [phone]  Mobile Phone: [phone]  Relation: Spouse     Pts. Friend Harriet Moreno ([phone])  is present during assessment and lists herself also as an emergency contact.       Living Arrangements   Household Composition:  Patient currently resides with: Spouse   If patient resides with spouse, please explain the marital relationship: Unable to assess, spouse did not attend assessment.   Does the patient currently own his/her own home? Yes   Does the patient currently rent home/apartment: No   Are current living arrangements permanent? Yes   If no, please explain:        Children's Names     Name Sex Age   1. Angélica Humphriesnchard female    2. Guillermo Humphriesnchard male    3.     4.     5.       Who will be the primary caregiver for the children when patient is admitted to the hospital?    Name:    Phone Number:         Support System   Primary Caregiver:     Name: Laurita Moore   Relationship: sister   Cell #: [phone]   Address:    Home #:    City:    Street:    ZIP:        Secondary Caregiver:     Name: Elda Trotter   Relationship: Grand daughter   Cell #: [phone]   Address:    Home #:    City:    Street:    ZIP:       Will patient's caregiver be available full-time? Yes   What is the patient's Taoist? Buddhism   Is patient currently practicing or non-practicing? No (wants  visits)      Does patient have any other sources for support? Yes      If yes, please explain: Family/Friends       Post BMT Plans      Does patient have full understanding of recovery from BMT? Yes   Does patient understand risk associated with BMT? Yes   What are patient's housing plans post BMT? Own home   Does patient have a Living Will? No   Does patient have a Power of ?  Yes   If yes, please give name of POA:  Name: Angélica Carroll    Phone #:        Employment Information     Is patient currently employed? No   Employer: Networked reference to record EEP 1000[Disabled   Phone #: Data Unavailable   Position: Retired ([address])   Full Time/Part Time?    YRS:        Secondary Employment Information     Employer:    Phone #:    Position:    Full Time/Part Time?    YRS:        Significant Other Employment Information     Employer: Self Employed    Phone #:    Position: retired   Full Time/Part Time?    YRS:          Financial Information     Monthly Income: $1900   Yearly Income:    Source of Income:  social security   Do you have any financial concerns? No   If yes, please explain:  Pt. States that she has been approved for 100% financial assistance thru [facility]       Insurance Information      Do you have health insurance?  Yes   Insurance [employer]   Policy #: [id number]   Group #: [id number]   Policy Garcia: self   Medicare: Yes   Medicare Part D: Yes   Medicaid: No   Do you have Disability Insurance? No      Do you have a Cancer Policy? No   Do you have medication/prescription coverage? Yes   Are you a ? No       Medical Information     Diagnosis: Multiple Myeloma   Date of Diagnosis: 04/2019   Is this a new diagnosis? Yes         If no, please explain:    Past Medical History: Past Medical  History:   Diagnosis Date    Acute respiratory failure with hypoxia 4/30/2019    FUENTES (acute kidney injury) 5/1/2019    Allergy     Anemia     Aortic aneurysm     Breast cancer 10/2011    left breast Stage 0 DCIS    Chronic diastolic congestive heart failure 11/6/2015    Colon polyp     GERD (gastroesophageal reflux disease)     History of colonic polyps     Hx of psychiatric care     Zoloft    HX: breast cancer     Hyperlipemia     Hypertension     ICH (intracerebral hemorrhage)     Major depressive disorder, single episode, mild 6/23/2016    Nuclear sclerosis 7/21/2014    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 7/21/2014    PAD (peripheral artery disease) 11/6/2015    Psychiatric problem     Statin-induced rhabdomyolysis     4/2015     Stroke 4/2011      Infusion Services: none   Home Health: none   Durable Medical Equipment: none   Activities of Daily Living: Fairly independent with adl's prior to admission. Has required some healp but states that family very supportive. Pt. States that her  has some medical problems himself and is limited in help that he can give. However, there are other family and friends that can step in to help.    Patient's Family Cancer History: Cancer-related family history includes Breast cancer (age of onset: 62) in her sister; Cancer in her father; Cancer (age of onset: 63) in her sister. There is no history of Ovarian cancer, Esophageal cancer, or Rectal cancer.       Cognitive Functioning     Cognitive State: alert, oriented   Does patient have any concerns that may affect medical follow up and full understanding of treatment? No   Does patient have any concerns that may impact medication compliance? No   Education Level: high school diploma/GED   Does the patient have any learning disabilities? No   If yes, please explain:    Can the patient read English? Yes   Can the patient write in English? Yes      Is the patient  Literate? Yes   What is the patient's primary language? English   Does the patient need interpretation services? No        Psychosocial History     Does patient have any emotional issues? No   If, yes please explain:     Does patient have a psychiatric history? No   If, yes please explain:     Is patient currently taking any psychiatric medication? No   If yes, please list medications:    Is patient currently in therapy or attending support groups? No   Where is the patient currently in therapy?    Therapist/Counselor Name:    Therapist/Counselor Phone #:        Alcohol/Drug Use/Abuse History     Alcohol Use: Social History     Substance and Sexual Activity   Alcohol Use Yes    Comment: on occasion - one glass wine every 3 months      Tobacco Use: Social History     Tobacco Use   Smoking Status Former Smoker    Packs/day: 0.50    Years: 20.00    Pack years: 10.00    Types: Cigarettes    Last attempt to quit: 2011    Years since quittin.5   Smokeless Tobacco Former User    Quit date: 4/3/2011      Drug Use: Social History     Substance and Sexual Activity   Drug Use No          Coping Skills     How is the patient currently coping with their diagnosis? Pt. States that she is doing well. She states that she is ready to move forward with transplant and that she feels she has a very good support from her family   Is the patient open/receptive to psychosocial intervention? Yes   Has the patient experienced any significant losses in his/her life? No      If yes, please explain:    What are the patient's identified needs? None noted   Goals: Successful transplant   Interview Behavior: appropriate   Suitability for Transplant: yes   Additional Comments: Discussed need for 24 hr caregiver post transplant. Pt. States that she has several family members that will be helping post transplant. She states that her sister and grand-dtr will be primary caregivers and will be available. Pt. Aware that caregiver is  required to proceed with transplant. Caregiver agreement signed.

## 2019-10-24 ENCOUNTER — INFUSION (OUTPATIENT)
Dept: INFUSION THERAPY | Facility: HOSPITAL | Age: 69
End: 2019-10-24
Attending: INTERNAL MEDICINE
Payer: MEDICARE

## 2019-10-24 VITALS
RESPIRATION RATE: 19 BRPM | SYSTOLIC BLOOD PRESSURE: 140 MMHG | HEART RATE: 58 BPM | BODY MASS INDEX: 29.49 KG/M2 | DIASTOLIC BLOOD PRESSURE: 70 MMHG | WEIGHT: 166.44 LBS | HEIGHT: 63 IN

## 2019-10-24 DIAGNOSIS — C90.00 MULTIPLE MYELOMA NOT HAVING ACHIEVED REMISSION: Primary | ICD-10-CM

## 2019-10-24 PROCEDURE — 63600175 PHARM REV CODE 636 W HCPCS: Mod: JG,HCNC | Performed by: INTERNAL MEDICINE

## 2019-10-24 PROCEDURE — 96401 CHEMO ANTI-NEOPL SQ/IM: CPT | Mod: HCNC

## 2019-10-24 RX ORDER — BORTEZOMIB 3.5 MG/1
1.3 INJECTION, POWDER, LYOPHILIZED, FOR SOLUTION INTRAVENOUS; SUBCUTANEOUS
Status: COMPLETED | OUTPATIENT
Start: 2019-10-24 | End: 2019-10-24

## 2019-10-24 RX ADMIN — BORTEZOMIB 2.4 MG: 3.5 INJECTION, POWDER, LYOPHILIZED, FOR SOLUTION INTRAVENOUS; SUBCUTANEOUS at 01:10

## 2019-10-28 DIAGNOSIS — E11.9 TYPE 2 DIABETES MELLITUS WITHOUT COMPLICATION: ICD-10-CM

## 2019-10-29 ENCOUNTER — PATIENT OUTREACH (OUTPATIENT)
Dept: ADMINISTRATIVE | Facility: OTHER | Age: 69
End: 2019-10-29

## 2019-10-31 ENCOUNTER — INFUSION (OUTPATIENT)
Dept: INFUSION THERAPY | Facility: HOSPITAL | Age: 69
End: 2019-10-31
Attending: INTERNAL MEDICINE
Payer: MEDICARE

## 2019-10-31 VITALS
HEIGHT: 63 IN | HEART RATE: 72 BPM | WEIGHT: 166 LBS | RESPIRATION RATE: 18 BRPM | DIASTOLIC BLOOD PRESSURE: 79 MMHG | SYSTOLIC BLOOD PRESSURE: 125 MMHG | BODY MASS INDEX: 29.41 KG/M2 | TEMPERATURE: 98 F

## 2019-10-31 DIAGNOSIS — C90.00 MULTIPLE MYELOMA NOT HAVING ACHIEVED REMISSION: Primary | ICD-10-CM

## 2019-10-31 PROCEDURE — 63600175 PHARM REV CODE 636 W HCPCS: Mod: JG,HCNC | Performed by: INTERNAL MEDICINE

## 2019-10-31 PROCEDURE — 96401 CHEMO ANTI-NEOPL SQ/IM: CPT | Mod: HCNC

## 2019-10-31 RX ORDER — SODIUM CHLORIDE 0.9 % (FLUSH) 0.9 %
10 SYRINGE (ML) INJECTION
Status: DISCONTINUED | OUTPATIENT
Start: 2019-10-31 | End: 2019-10-31 | Stop reason: HOSPADM

## 2019-10-31 RX ORDER — HEPARIN 100 UNIT/ML
500 SYRINGE INTRAVENOUS
Status: DISCONTINUED | OUTPATIENT
Start: 2019-10-31 | End: 2019-10-31 | Stop reason: HOSPADM

## 2019-10-31 RX ORDER — BORTEZOMIB 3.5 MG/1
1.3 INJECTION, POWDER, LYOPHILIZED, FOR SOLUTION INTRAVENOUS; SUBCUTANEOUS
Status: COMPLETED | OUTPATIENT
Start: 2019-10-31 | End: 2019-10-31

## 2019-10-31 RX ORDER — BORTEZOMIB 3.5 MG/1
1.3 INJECTION, POWDER, LYOPHILIZED, FOR SOLUTION INTRAVENOUS; SUBCUTANEOUS
Status: CANCELLED | OUTPATIENT
Start: 2019-10-31

## 2019-10-31 RX ORDER — HEPARIN 100 UNIT/ML
500 SYRINGE INTRAVENOUS
Status: CANCELLED | OUTPATIENT
Start: 2019-10-31

## 2019-10-31 RX ORDER — SODIUM CHLORIDE 0.9 % (FLUSH) 0.9 %
10 SYRINGE (ML) INJECTION
Status: CANCELLED | OUTPATIENT
Start: 2019-10-31

## 2019-10-31 RX ADMIN — BORTEZOMIB 2.4 MG: 3.5 INJECTION, POWDER, LYOPHILIZED, FOR SOLUTION INTRAVENOUS; SUBCUTANEOUS at 11:10

## 2019-10-31 NOTE — NURSING
1140 pt here for velcade injection, labs, hx, meds, allergies reviewed, pt stayed in wheelchair for injection tolerated well to abdomen, no distress noted upon d/c to home with daughter via wheelchair

## 2019-11-01 ENCOUNTER — HOSPITAL ENCOUNTER (OUTPATIENT)
Dept: RADIOLOGY | Facility: HOSPITAL | Age: 69
Discharge: HOME | End: 2019-11-01
Attending: INTERNAL MEDICINE
Payer: MEDICARE

## 2019-11-01 ENCOUNTER — OFFICE VISIT (OUTPATIENT)
Dept: PULMONOLOGY | Facility: CLINIC | Age: 69
End: 2019-11-01
Payer: MEDICARE

## 2019-11-01 ENCOUNTER — PATIENT MESSAGE (OUTPATIENT)
Dept: HEMATOLOGY/ONCOLOGY | Facility: CLINIC | Age: 69
End: 2019-11-01

## 2019-11-01 VITALS
HEART RATE: 83 BPM | BODY MASS INDEX: 28.56 KG/M2 | SYSTOLIC BLOOD PRESSURE: 110 MMHG | HEIGHT: 63 IN | WEIGHT: 161.19 LBS | DIASTOLIC BLOOD PRESSURE: 79 MMHG | OXYGEN SATURATION: 94 %

## 2019-11-01 DIAGNOSIS — J84.9 INTERSTITIAL LUNG DISEASE: ICD-10-CM

## 2019-11-01 DIAGNOSIS — Z76.82 STEM CELL TRANSPLANT CANDIDATE: ICD-10-CM

## 2019-11-01 DIAGNOSIS — R94.2 ABNORMAL PFT: ICD-10-CM

## 2019-11-01 DIAGNOSIS — R06.02 SHORTNESS OF BREATH ON EXERTION: ICD-10-CM

## 2019-11-01 DIAGNOSIS — R94.2 ABNORMAL PFT: Primary | ICD-10-CM

## 2019-11-01 PROCEDURE — 99999 PR PBB SHADOW E&M-EST. PATIENT-LVL III: CPT | Mod: PBBFAC,HCNC,, | Performed by: INTERNAL MEDICINE

## 2019-11-01 PROCEDURE — 71250 CT THORAX DX C-: CPT | Mod: TC,HCNC

## 2019-11-01 PROCEDURE — 99499 UNLISTED E&M SERVICE: CPT | Mod: HCNC,BMT,S$GLB, | Performed by: INTERNAL MEDICINE

## 2019-11-01 PROCEDURE — 3074F PR MOST RECENT SYSTOLIC BLOOD PRESSURE < 130 MM HG: ICD-10-PCS | Mod: HCNC,CPTII,S$GLB, | Performed by: INTERNAL MEDICINE

## 2019-11-01 PROCEDURE — 1101F PR PT FALLS ASSESS DOC 0-1 FALLS W/OUT INJ PAST YR: ICD-10-PCS | Mod: HCNC,CPTII,S$GLB, | Performed by: INTERNAL MEDICINE

## 2019-11-01 PROCEDURE — 99204 PR OFFICE/OUTPT VISIT, NEW, LEVL IV, 45-59 MIN: ICD-10-PCS | Mod: 25,HCNC,S$GLB, | Performed by: INTERNAL MEDICINE

## 2019-11-01 PROCEDURE — 71250 CT THORAX DX C-: CPT | Mod: 26,HCNC,, | Performed by: RADIOLOGY

## 2019-11-01 PROCEDURE — 71250 CT CHEST WITHOUT CONTRAST: ICD-10-PCS | Mod: 26,HCNC,, | Performed by: RADIOLOGY

## 2019-11-01 PROCEDURE — 1101F PT FALLS ASSESS-DOCD LE1/YR: CPT | Mod: HCNC,CPTII,S$GLB, | Performed by: INTERNAL MEDICINE

## 2019-11-01 PROCEDURE — 99999 PR PBB SHADOW E&M-EST. PATIENT-LVL III: ICD-10-PCS | Mod: PBBFAC,HCNC,, | Performed by: INTERNAL MEDICINE

## 2019-11-01 PROCEDURE — 3074F SYST BP LT 130 MM HG: CPT | Mod: HCNC,CPTII,S$GLB, | Performed by: INTERNAL MEDICINE

## 2019-11-01 PROCEDURE — 3078F PR MOST RECENT DIASTOLIC BLOOD PRESSURE < 80 MM HG: ICD-10-PCS | Mod: HCNC,CPTII,S$GLB, | Performed by: INTERNAL MEDICINE

## 2019-11-01 PROCEDURE — 99204 OFFICE O/P NEW MOD 45 MIN: CPT | Mod: 25,HCNC,S$GLB, | Performed by: INTERNAL MEDICINE

## 2019-11-01 PROCEDURE — 3078F DIAST BP <80 MM HG: CPT | Mod: HCNC,CPTII,S$GLB, | Performed by: INTERNAL MEDICINE

## 2019-11-01 PROCEDURE — 99499 RISK ADDL DX/OHS AUDIT: ICD-10-PCS | Mod: HCNC,BMT,S$GLB, | Performed by: INTERNAL MEDICINE

## 2019-11-01 NOTE — PROGRESS NOTES
Subjective:       Patient ID: Shelby Thompson is a 69 y.o. female.    Chief Complaint: Multiple myeloma, remission status unspecified    HPI:   Shelby Thompson is a 69 y.o. female new to me previously seen by Dr. Tompkins on 4/2/15 who has a history of MM on Rx w/o remission w/ plans for SCT, HFpEF who presents for evaluation of decreased DLCO noted on recent PFTs acquired as part of the transplant evaluation.     Overall, Ms Thompson denies significant pulmonary symptoms. She does report ACEVEDO that is chronic and unchanged. She denies daily cough, sputum production, hemoptysis, chest pain, rashes, arthralgias, oral ulcerations, hair/nail changes, dry eyes/mouth. She denies exercising. Reports occasional LE edema. Denies orthopnea/pnd.    She previously smoked 1/2 ppd for 20 years. Quit in 2011. Denies recurrent URIs.     She is followed by Dr. Singh for multiple myeloma that has not completely responded to Rx. She is being evaluated for BMT. PFTs with read below.    Review of Systems   Constitutional: Positive for fatigue. Negative for fever, chills, weight loss, activity change, appetite change, night sweats and weakness.   HENT: Negative for postnasal drip, rhinorrhea, sinus pressure and voice change.    Eyes: Negative for redness.   Respiratory: Positive for shortness of breath. Negative for snoring, cough, hemoptysis, sputum production, wheezing, asthma nighttime symptoms, dyspnea on extertion and somnolence.    Cardiovascular: Positive for leg swelling. Negative for palpitations.   Genitourinary: Negative for hematuria.   Musculoskeletal: Negative for joint swelling and myalgias.   Skin: Negative for rash.   Gastrointestinal: Negative for nausea, vomiting and acid reflux.   Neurological: Negative for syncope.   Hematological: No excessive bruising.   Psychiatric/Behavioral: Negative for sleep disturbance.         Social History     Tobacco Use    Smoking status: Former Smoker     Packs/day: 0.50     Years:  20.00     Pack years: 10.00     Types: Cigarettes     Last attempt to quit: 2011     Years since quittin.5    Smokeless tobacco: Former User     Quit date: 4/3/2011   Substance Use Topics    Alcohol use: Yes     Comment: on occasion - one glass wine every 3 months       Review of patient's allergies indicates:   Allergen Reactions    Lipitor [atorvastatin]      Itching, elevated cpk, muscle aches, statin induced myositis     Past Medical History:   Diagnosis Date    Acute respiratory failure with hypoxia 2019    FUENTES (acute kidney injury) 2019    Allergy     Anemia     Aortic aneurysm     Breast cancer 10/2011    left breast Stage 0 DCIS    Chronic diastolic congestive heart failure 2015    Colon polyp     GERD (gastroesophageal reflux disease)     History of colonic polyps     Hx of psychiatric care     Zoloft    HX: breast cancer     Hyperlipemia     Hypertension     ICH (intracerebral hemorrhage)     Major depressive disorder, single episode, mild 2016    Nuclear sclerosis 2014    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 2014    PAD (peripheral artery disease) 2015    Psychiatric problem     Statin-induced rhabdomyolysis     2015     Stroke 2011     Past Surgical History:   Procedure Laterality Date    APPENDECTOMY      BONE MARROW BIOPSY Left 10/3/2019    Procedure: Biopsy-bone marrow;  Surgeon: Jere Tineo MD;  Location: [facility] OR [address] [place] [address];  Service: Oncology;  Laterality: Left;    BREAST BIOPSY Left 10/2011    BREAST LUMPECTOMY Left     DCIS    COLONOSCOPY      CYST REMOVAL      back    ESOPHAGOGASTRODUODENOSCOPY  2016    duodenal angioectasia    FOOT FRACTURE SURGERY  10/2012    right foot, with R hallux valgus repair    FRACTURE SURGERY Right     broken toe repiar    HEMORRHOID SURGERY      LIVER BIOPSY  2015    essentially normal, no fibrosis    TUBAL LIGATION      UPPER  GASTROINTESTINAL ENDOSCOPY       Current Outpatient Medications on File Prior to Visit   Medication Sig    acetaminophen (TYLENOL) 650 MG TbSR Take 1,300 mg by mouth daily as needed (pain).    acyclovir (ZOVIRAX) 400 MG tablet Take 1 tablet (400 mg total) by mouth 2 (two) times daily.    alirocumab (PRALUENT PEN) 75 mg/mL PnIj Inject 1 mL (75 mg total) into the skin every 14 (fourteen) days. Ask your PCP/ Oncologist    allopurinol (ZYLOPRIM) 300 MG tablet Take 1 tablet (300 mg total) by mouth once daily.    amLODIPine (NORVASC) 5 MG tablet TAKE 1 TABLET BY MOUTH EVERY DAY    aspirin 81 mg Tab Take 1 tablet by mouth Daily.    bisacodyl (DULCOLAX) 5 mg EC tablet Take 5 mg by mouth once daily.    dexAMETHasone (DECADRON) 4 MG Tab Take 5 tablets (20 mg total) by mouth once a week. Take 5 tabs or 20 mg weekly, all tabs on same day as weekly Velcade injection    ferrous gluconate 324 mg (37.5 mg iron) Tab TAKE 1 TABLET BY MOUTH 2 (TWO) TIMES DAILY WITH MEALS.    FLUZONE HIGH-DOSE 2019-20, PF, 180 mcg/0.5 mL Syrg TO BE ADMINISTERED BY PHARMACIST FOR IMMUNIZATION    gabapentin (NEURONTIN) 300 MG capsule TAKE 1 CAPSULE (300 MG TOTAL) BY MOUTH 2 (TWO) TIMES DAILY.    lidocaine (LIDODERM) 5 % Place 1 patch onto the skin once daily. Remove & Discard patch within 12 hours or as directed by MD    omeprazole (PRILOSEC) 40 MG capsule TAKE 1 CAPSULE (40 MG TOTAL) BY MOUTH ONCE DAILY.    ondansetron (ZOFRAN) 4 MG tablet Take 1 tablet (4 mg total) by mouth every 6 (six) hours as needed for Nausea.    ondansetron (ZOFRAN-ODT) 8 MG TbDL Take 1 tablet (8 mg total) by mouth every 8 (eight) hours as needed (nausea/vomiting).    REVLIMID (REVLIMID) 25 mg Cap Take 1 capsule (25 mg total) by mouth once daily auth # [id number].    sertraline (ZOLOFT) 50 MG tablet TAKE 1 TABLET (50 MG TOTAL) BY MOUTH ONCE DAILY.    traMADol (ULTRAM) 50 mg tablet Take 1 tablet (50 mg total) by mouth 2 (two) times daily as needed for Pain.     vitamin D 1000 units Tab Take 185 mg by mouth once daily.     Current Facility-Administered Medications on File Prior to Visit   Medication    zoledronic acid (ZOMETA) 4 mg in sodium chloride 0.9% 100 mL IVPB       Objective:      Vitals:    11/01/19 1118   BP: 110/79   Pulse: 83     Physical Exam   Constitutional: She is oriented to person, place, and time. She appears well-nourished. No distress.   HENT:   Head: Normocephalic.   Nose: No mucosal edema.   Mouth/Throat: Oropharynx is clear and moist. Mallampati Score: III.   Neck: Normal range of motion. Neck supple. No tracheal deviation present.   Cardiovascular: Normal rate, normal heart sounds and intact distal pulses.   No murmur heard.  Pulmonary/Chest: Normal expansion, symmetric chest wall expansion, effort normal and breath sounds normal. No respiratory distress. She has no wheezes. She has no rhonchi. She has no rales.   Abdominal: Bowel sounds are normal.   Musculoskeletal: She exhibits edema.   Lymphadenopathy: No supraclavicular adenopathy is present.     She has no cervical adenopathy.   Neurological: She is alert and oriented to person, place, and time.   Skin: Skin is warm and dry. No rash noted.   Psychiatric: She has a normal mood and affect.   Nursing note and vitals reviewed.      Personal Diagnostic Review    Labs-  4/30/19- BNP 1,070     CXR 10/16/19- Images personally reviewed and compared to multiple priors. I agree w/ the radiologist who notes:  Lung volumes are normal and symmetric. No acute pulmonary disease and no pleural fluid, lymph node enlargement, cardiac decompensation, pneumothorax, pneumomediastinum, pneumoperitoneum or significant osseous abnormality.    PFTs 10/16/19. Personally reviewed. Compared to spirometry and diffusion on 4/2/15- both with normal spirometry. DLCO decreased from 11.77 to 10.55.     TTE 10/16/19.  · Normal left ventricular systolic function. The estimated ejection fraction is 60%  · The left ventricular  global longitudinal strain is -18.6%.  · Grade II (moderate) left ventricular diastolic dysfunction consistent with pseudonormalization.  · No wall motion abnormalities.  · Severe left atrial enlargement.  · Normal right ventricular systolic function.  · Mild-to-moderate mitral regurgitation.  · Mild tricuspid regurgitation.  · The estimated PA systolic pressure is 41 mm Hg  · Pulmonary hypertension present.  · Normal central venous pressure (3 mm Hg).            Assessment:     Problem List Items Addressed This Visit        Pulmonary    Abnormal PFT - Primary    Current Assessment & Plan     Isolated reduction in DLCO with normal spirometry. No lung volumes available for review. Evaluation in the past revealed that diastolic dysfunction was the most likely etiology of the decreased diffusion capacity (elevated BNP, severe LA dilation). The diffusion has decreased slightly between 4/2015 and 10/16/19.    - Check CT Chest for evaluation of ILD  - Check BNP with next lab draw, ordered  - Check 6 MWT for desaturation, ordered     - If no significant desaturation, then significant PH is unlikely         Relevant Orders    CT Chest Without Contrast (Completed)    B-TYPE NATRIURETIC PEPTIDE    Stress test, pulmonary    Interstitial lung disease    Relevant Orders    CT Chest Without Contrast (Completed)    B-TYPE NATRIURETIC PEPTIDE    Stress test, pulmonary       Other    Shortness of breath on exertion    Current Assessment & Plan     6 MWT, CT Chest pending    - Encouraged regular, progressive exercise  - Yearly flu shot, received  - UTD on PNA vaccines  - No indication for inhalers at this time         Stem cell transplant candidate    Current Assessment & Plan     Currently undergoing evaluation with Dr. Tineo

## 2019-11-01 NOTE — LETTER
November 4, 2019      Jere Tineo MD  [address] [facility] [address]           [facility] Pulmonary [facility]  [address]  [facility] [address]  Phone: [phone]          Patient: Shelby Thompson   MR Number: [id number]   YOB: 1950   Date of Visit: 11/1/2019       Dear Dr. Jere Tineo:    Thank you for referring Shelby Thompson to me for evaluation. Attached you will find relevant portions of my assessment and plan of care.    If you have questions, please do not hesitate to call me. I look forward to following Shelby Thompson along with you.    Sincerely,    Moses Umaña MD    Enclosure  CC:  No Recipients    If you would like to receive this communication electronically, please contact [email or web address] or [phone] to request more information on [employer] access.    For providers and/or their staff who would like to refer a patient to [facility], please contact us through our one-stop-shop provider referral line, [facility], at [phone].    If you feel you have received this communication in error or would no longer like to receive these types of communications, please e-mail [email or web address]

## 2019-11-04 ENCOUNTER — TELEPHONE (OUTPATIENT)
Dept: PULMONOLOGY | Facility: CLINIC | Age: 69
End: 2019-11-04

## 2019-11-04 DIAGNOSIS — C90.00 MULTIPLE MYELOMA, REMISSION STATUS UNSPECIFIED: ICD-10-CM

## 2019-11-04 PROBLEM — R94.2 ABNORMAL PFTS: Status: ACTIVE | Noted: 2019-11-04

## 2019-11-04 PROBLEM — J84.9 INTERSTITIAL LUNG DISEASE: Status: ACTIVE | Noted: 2019-11-04

## 2019-11-04 NOTE — TELEPHONE ENCOUNTER
Spoke with patient, her lab work have been scheduled for tomorrow, 11/05/19 beginning at 10am following that her six minute has also been scheduled for 11:20am. Patient has confirmed and accepted both appointments with me as well.

## 2019-11-04 NOTE — TELEPHONE ENCOUNTER
----- Message from Moses Umaña MD sent at 11/4/2019  2:49 PM CST -----  Pinky Newman! Hope you had a great weekend. What is the name of our office pool? If you are busy, this can definitely go to someone else.    I ordered a BNP as well as a 6 MWT. Can you or whoever is free give this lady a call to schedule it?    Thanks!    Jean Paul

## 2019-11-04 NOTE — ASSESSMENT & PLAN NOTE
Isolated reduction in DLCO with normal spirometry. No lung volumes available for review. Evaluation in the past revealed that diastolic dysfunction was the most likely etiology of the decreased diffusion capacity (elevated BNP, severe LA dilation). The diffusion has decreased slightly between 4/2015 and 10/16/19.    - Check CT Chest for evaluation of ILD  - Check BNP with next lab draw, ordered  - Check 6 MWT for desaturation, ordered     - If no significant desaturation, then significant PH is unlikely

## 2019-11-04 NOTE — ASSESSMENT & PLAN NOTE
6 MWT, CT Chest pending    - Encouraged regular, progressive exercise  - Yearly flu shot, received  - UTD on PNA vaccines  - No indication for inhalers at this time

## 2019-11-05 ENCOUNTER — OFFICE VISIT (OUTPATIENT)
Dept: HEMATOLOGY/ONCOLOGY | Facility: CLINIC | Age: 69
End: 2019-11-05
Payer: MEDICARE

## 2019-11-05 ENCOUNTER — TELEPHONE (OUTPATIENT)
Dept: GYNECOLOGIC ONCOLOGY | Facility: CLINIC | Age: 69
End: 2019-11-05

## 2019-11-05 ENCOUNTER — HOSPITAL ENCOUNTER (OUTPATIENT)
Dept: PULMONOLOGY | Facility: CLINIC | Age: 69
Discharge: HOME | End: 2019-11-05
Payer: MEDICARE

## 2019-11-05 VITALS — BODY MASS INDEX: 28.35 KG/M2 | HEIGHT: 63 IN | WEIGHT: 160 LBS

## 2019-11-05 VITALS
OXYGEN SATURATION: 96 % | HEART RATE: 70 BPM | HEIGHT: 63 IN | WEIGHT: 161.38 LBS | DIASTOLIC BLOOD PRESSURE: 82 MMHG | SYSTOLIC BLOOD PRESSURE: 134 MMHG | RESPIRATION RATE: 16 BRPM | BODY MASS INDEX: 28.59 KG/M2 | TEMPERATURE: 99 F

## 2019-11-05 DIAGNOSIS — R94.8 ABNORMAL POSITRON EMISSION TOMOGRAPHY (PET) SCAN: ICD-10-CM

## 2019-11-05 DIAGNOSIS — C90.00 MULTIPLE MYELOMA, REMISSION STATUS UNSPECIFIED: Primary | ICD-10-CM

## 2019-11-05 DIAGNOSIS — R94.2 ABNORMAL PFT: ICD-10-CM

## 2019-11-05 DIAGNOSIS — Z76.82 STEM CELL TRANSPLANT CANDIDATE: ICD-10-CM

## 2019-11-05 DIAGNOSIS — R93.1 ABNORMAL ECHOCARDIOGRAM: ICD-10-CM

## 2019-11-05 DIAGNOSIS — R94.2 ABNORMAL PULMONARY FUNCTION TEST: ICD-10-CM

## 2019-11-05 DIAGNOSIS — R93.2 ABNORMAL MRI, LIVER: ICD-10-CM

## 2019-11-05 DIAGNOSIS — J84.9 INTERSTITIAL LUNG DISEASE: ICD-10-CM

## 2019-11-05 DIAGNOSIS — R93.89 ABNORMAL FINDING ON IMAGING: ICD-10-CM

## 2019-11-05 PROCEDURE — 99215 PR OFFICE/OUTPT VISIT, EST, LEVL V, 40-54 MIN: ICD-10-PCS | Mod: HCNC,S$GLB,, | Performed by: INTERNAL MEDICINE

## 2019-11-05 PROCEDURE — 99999 PR PBB SHADOW E&M-EST. PATIENT-LVL IV: CPT | Mod: PBBFAC,HCNC,, | Performed by: INTERNAL MEDICINE

## 2019-11-05 PROCEDURE — 94618 PULMONARY STRESS TESTING: CPT | Mod: HCNC,S$GLB,, | Performed by: INTERNAL MEDICINE

## 2019-11-05 PROCEDURE — 1101F PT FALLS ASSESS-DOCD LE1/YR: CPT | Mod: HCNC,CPTII,S$GLB, | Performed by: INTERNAL MEDICINE

## 2019-11-05 PROCEDURE — 99215 OFFICE O/P EST HI 40 MIN: CPT | Mod: HCNC,S$GLB,, | Performed by: INTERNAL MEDICINE

## 2019-11-05 PROCEDURE — 99999 PR PBB SHADOW E&M-EST. PATIENT-LVL IV: ICD-10-PCS | Mod: PBBFAC,HCNC,, | Performed by: INTERNAL MEDICINE

## 2019-11-05 PROCEDURE — 3075F PR MOST RECENT SYSTOLIC BLOOD PRESS GE 130-139MM HG: ICD-10-PCS | Mod: HCNC,CPTII,S$GLB, | Performed by: INTERNAL MEDICINE

## 2019-11-05 PROCEDURE — 3079F PR MOST RECENT DIASTOLIC BLOOD PRESSURE 80-89 MM HG: ICD-10-PCS | Mod: HCNC,CPTII,S$GLB, | Performed by: INTERNAL MEDICINE

## 2019-11-05 PROCEDURE — 3079F DIAST BP 80-89 MM HG: CPT | Mod: HCNC,CPTII,S$GLB, | Performed by: INTERNAL MEDICINE

## 2019-11-05 PROCEDURE — 1101F PR PT FALLS ASSESS DOC 0-1 FALLS W/OUT INJ PAST YR: ICD-10-PCS | Mod: HCNC,CPTII,S$GLB, | Performed by: INTERNAL MEDICINE

## 2019-11-05 PROCEDURE — 94618 PULMONARY STRESS TESTING: ICD-10-PCS | Mod: HCNC,S$GLB,, | Performed by: INTERNAL MEDICINE

## 2019-11-05 PROCEDURE — 3075F SYST BP GE 130 - 139MM HG: CPT | Mod: HCNC,CPTII,S$GLB, | Performed by: INTERNAL MEDICINE

## 2019-11-05 RX ORDER — LENALIDOMIDE 25 MG/1
CAPSULE ORAL
Qty: 21 EACH | Refills: 0 | Status: SHIPPED | OUTPATIENT
Start: 2019-11-05 | End: 2020-02-10

## 2019-11-05 RX ORDER — OMEPRAZOLE 40 MG/1
CAPSULE, DELAYED RELEASE ORAL
Qty: 90 CAPSULE | Refills: 3 | Status: SHIPPED | OUTPATIENT
Start: 2019-11-05 | End: 2020-04-22 | Stop reason: SDUPTHER

## 2019-11-05 NOTE — Clinical Note
cbc, cmp, serum free light chains, quantitative immunoglobulins, serum electropheresis, serum immunofixation and appt with me in 2 weeks; please move long term fu pt to np schedule to make room if needed

## 2019-11-05 NOTE — TELEPHONE ENCOUNTER
----- Message from Zainab Oviedo RN sent at 11/5/2019  3:14 PM CST -----  This pt is being worked up for stem cell transplant and her latest Pet showed some endometrial thickening.  Dr. Tineo wanted this checked out before they can proceed.   Brendan, can you reach out to this pt and see if she can be seen this Thursday. If she can't be seen on Thursday we can schedule her with another provider.    Zainab

## 2019-11-05 NOTE — PROCEDURES
Shelby Thompson is a 69 y.o.  female patient, who presents for a 6 minute walk test ordered by MD Parish.  The diagnosis is Shortness of Breath.  The patient's BMI is 28.4 kg/m2.  Predicted distance (lower limit of normal) is 292.68 meters.      Test Results:    The test was completed without stopping.    The total time walked was 360 seconds.  During walking, the patient reported:  No complaints. The patient used a walker  during testing.     11/05/2019---------Distance: 284.07 meters (932 feet)     O2 Sat % Supplemental Oxygen Heart Rate Blood Pressure Jamey Scale   Pre-exercise  (Resting) 96 % Room Air 90 bpm 137/80 mmHg 2   During Exercise 95 % Room Air 77 bpm (!) 175/93 mmHg 3   Post-exercise  (Recovery) 95 % Room Air  76 bpm 159/74 mmHg       Recovery Time: 600 seconds    Performing nurse/tech: Estopinal RRT      PREVIOUS STUDY:   The patient has not had a previous study.      CLINICAL INTERPRETATION:  Six minute walk distance is 284.07 meters (932 feet) with moderate dyspnea.  During exercise, there was no significant desaturation while breathing room air.  Blood pressure increased significantly and Heart rate remained stable with walking.  This may represent a hypertensive response to exercise.  The patient did not report non-pulmonary symptoms during exercise.  No previous study performed.  Based upon age and body mass index, exercise capacity is less than predicted.

## 2019-11-05 NOTE — PROGRESS NOTES
PATIENT: Shelby Thompson  MRN: [id number]  DATE: 11/6/2019      Diagnosis:   1. Multiple myeloma, remission status unspecified    2. Stem cell transplant candidate    3. Abnormal positron emission tomography (PET) scan    4. Abnormal echocardiogram    5. Abnormal pulmonary function test    6. Abnormal MRI, liver        Chief Complaint: No chief complaint on file.      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  Extensive PMH as below.    2 prior CVAs (one in 2008, one in 2011)  L breast cancer DCIS stage 0 (s/p lumpectomy and radiation, no endocrine tx due to CVA)  HTN  DM II  Neuropathy, b/l hands  Prior smoker, quit in 2011  Hepatic lesions - vascular per recent MRI in 09 /2019 with no changes; will confirm no further rascon needed from hep  Statin Intolerance - on praluent, PCSK9 inhibitor   HFpEF - EF 55%, diastolic dysfunction  Anxiety/Depression       at our last appt we discussed possible clifton 140mg/m2 transplatn pending adequate pre transplant.   No change in clinical status since our last appt.   Seeing pulmonology for abnormal PFT's with rascon ongoing.          Past Medical History:   Past Medical History:   Diagnosis Date    Acute respiratory failure with hypoxia 4/30/2019    FUENTES (acute kidney injury) 5/1/2019    Allergy     Anemia     Aortic aneurysm     Breast cancer 10/2011    left breast Stage 0 DCIS    Chronic diastolic congestive heart failure 11/6/2015    Colon polyp     GERD (gastroesophageal reflux disease)     History of colonic polyps     Hx of psychiatric care     Zoloft    HX: breast cancer     Hyperlipemia     Hypertension     ICH (intracerebral hemorrhage)     Major  depressive disorder, single episode, mild 6/23/2016    Nuclear sclerosis 7/21/2014    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 7/21/2014    PAD (peripheral artery disease) 11/6/2015    Psychiatric problem     Statin-induced rhabdomyolysis     4/2015     Stroke 4/2011       Past Surgical HIstory:   Past Surgical History:   Procedure Laterality Date    APPENDECTOMY      BONE MARROW BIOPSY Left 10/3/2019    Procedure: Biopsy-bone marrow;  Surgeon: Jere Tineo MD;  Location: [facility] OR [address] [place] [address];  Service: Oncology;  Laterality: Left;    BREAST BIOPSY Left 10/2011    BREAST LUMPECTOMY Left 2011    DCIS    COLONOSCOPY      CYST REMOVAL      back    ESOPHAGOGASTRODUODENOSCOPY  07/2016    duodenal angioectasia    FOOT FRACTURE SURGERY  10/2012    right foot, with R hallux valgus repair    FRACTURE SURGERY Right     broken toe repiar    HEMORRHOID SURGERY      LIVER BIOPSY  04/2015    essentially normal, no fibrosis    TUBAL LIGATION      UPPER GASTROINTESTINAL ENDOSCOPY         Family History:   Family History   Problem Relation Age of Onset    No Known Problems Sister     Cancer Sister 63        Lung Cancer    No Known Problems Mother     Cancer Father     No Known Problems Brother     Hypertension Daughter     Fibroids Daughter         uterine    Hypertension Son     Breast cancer Sister 62    No Known Problems Brother     No Known Problems Maternal Aunt     No Known Problems Maternal Uncle     No Known Problems Paternal Aunt     No Known Problems Paternal Uncle     Hypertension Maternal Grandmother     No Known Problems Maternal Grandfather     No Known Problems Paternal Grandmother     No Known Problems Paternal Grandfather     Ovarian cancer Neg Hx     Colon cancer Neg Hx     Tremor Neg Hx     Amblyopia Neg Hx     Blindness Neg Hx     Cataracts Neg Hx     Diabetes Neg Hx     Glaucoma Neg Hx     Macular degeneration Neg Hx     Retinal  detachment Neg Hx     Strabismus Neg Hx     Stroke Neg Hx     Thyroid disease Neg Hx     Esophageal cancer Neg Hx     Rectal cancer Neg Hx     Stomach cancer Neg Hx     Ulcerative colitis Neg Hx     Crohn's disease Neg Hx     Irritable bowel syndrome Neg Hx     Celiac disease Neg Hx        Social History:  reports that she quit smoking about 8 years ago. Her smoking use included cigarettes. She has a 10.00 pack-year smoking history. She quit smokeless tobacco use about 8 years ago. She reports that she drinks alcohol. She reports that she does not use drugs.    Allergies:  Review of patient's allergies indicates:   Allergen Reactions    Lipitor [atorvastatin]      Itching, elevated cpk, muscle aches, statin induced myositis       Medications:  Current Outpatient Medications   Medication Sig Dispense Refill    acetaminophen (TYLENOL) 650 MG TbSR Take 1,300 mg by mouth daily as needed (pain).      acyclovir (ZOVIRAX) 400 MG tablet Take 1 tablet (400 mg total) by mouth 2 (two) times daily. 120 tablet 2    alirocumab (PRALUENT PEN) 75 mg/mL PnIj Inject 1 mL (75 mg total) into the skin every 14 (fourteen) days. Ask your PCP/ Oncologist 2 mL 11    allopurinol (ZYLOPRIM) 300 MG tablet Take 1 tablet (300 mg total) by mouth once daily. 30 tablet 1    amLODIPine (NORVASC) 5 MG tablet TAKE 1 TABLET BY MOUTH EVERY DAY 90 tablet 3    aspirin 81 mg Tab Take 1 tablet by mouth Daily.      bisacodyl (DULCOLAX) 5 mg EC tablet Take 5 mg by mouth once daily.      dexAMETHasone (DECADRON) 4 MG Tab Take 5 tablets (20 mg total) by mouth once a week. Take 5 tabs or 20 mg weekly, all tabs on same day as weekly Velcade injection 60 tablet 0    ferrous gluconate 324 mg (37.5 mg iron) Tab TAKE 1 TABLET BY MOUTH 2 (TWO) TIMES DAILY WITH MEALS. 180 tablet 0    FLUZONE HIGH-DOSE 2019-20, PF, 180 mcg/0.5 mL Syrg TO BE ADMINISTERED BY PHARMACIST FOR IMMUNIZATION  0    gabapentin (NEURONTIN) 300 MG capsule TAKE 1 CAPSULE (300  "MG TOTAL) BY MOUTH 2 (TWO) TIMES DAILY. 180 capsule 3    lidocaine (LIDODERM) 5 % Place 1 patch onto the skin once daily. Remove & Discard patch within 12 hours or as directed by MD 60 patch 0    omeprazole (PRILOSEC) 40 MG capsule TAKE 1 CAPSULE BY MOUTH EVERY DAY 90 capsule 3    ondansetron (ZOFRAN) 4 MG tablet Take 1 tablet (4 mg total) by mouth every 6 (six) hours as needed for Nausea. 30 tablet 1    ondansetron (ZOFRAN-ODT) 8 MG TbDL Take 1 tablet (8 mg total) by mouth every 8 (eight) hours as needed (nausea/vomiting). 60 tablet 1    sertraline (ZOLOFT) 50 MG tablet TAKE 1 TABLET (50 MG TOTAL) BY MOUTH ONCE DAILY. 90 tablet 3    traMADol (ULTRAM) 50 mg tablet Take 1 tablet (50 mg total) by mouth 2 (two) times daily as needed for Pain. 60 tablet 3    vitamin D 1000 units Tab Take 185 mg by mouth once daily.      REVLIMID 25 mg Cap TAKE 1 CAPSULE BY MOUTH EVERY DAY 21 each 0     Current Facility-Administered Medications   Medication Dose Route Frequency Provider Last Rate Last Dose    zoledronic acid (ZOMETA) 4 mg in sodium chloride 0.9% 100 mL IVPB  4 mg Intravenous 1 time in Clinic/HOD Genny Napoles MD           Review of Systems   Constitutional: Negative for fatigue and unexpected weight change.   Respiratory: Negative for cough.    Cardiovascular: Negative for chest pain and palpitations.   Gastrointestinal: Negative for abdominal pain.   Genitourinary: Negative for difficulty urinating and dysuria.   Musculoskeletal: Positive for back pain and gait problem.   Skin: Negative for rash.   Neurological: Positive for tremors, weakness and numbness. Negative for headaches.   Hematological: Negative for adenopathy.       ECOG Performance Status: 1   Objective:      Vitals:   Vitals:    11/05/19 0910   BP: 134/82   Pulse: 70   Resp: 16   Temp: 98.7 °F (37.1 °C)   TempSrc: Oral   SpO2: 96%   Weight: 73.2 kg (161 lb 6 oz)   Height: 5' 3" (1.6 m)             Physical Exam   Constitutional: She is oriented " to person, place, and time. She appears well-developed and well-nourished. No distress.   HENT:   Head: Normocephalic and atraumatic.   Mouth/Throat: Oropharynx is clear and moist.   Eyes: Pupils are equal, round, and reactive to light. Conjunctivae and EOM are normal.   Neck: Normal range of motion. Neck supple.   Cardiovascular: Normal rate, regular rhythm and normal heart sounds. Exam reveals no gallop and no friction rub.   No murmur heard.  Pulmonary/Chest: Effort normal and breath sounds normal. No respiratory distress. She has no wheezes. She has no rales.   Abdominal: Soft. Bowel sounds are normal. She exhibits no distension. There is no tenderness. There is no guarding.   Musculoskeletal: Normal range of motion. She exhibits no edema.   Lymphadenopathy:     She has no cervical adenopathy.   Neurological: She is alert and oriented to person, place, and time. No cranial nerve deficit.   Intentional R hand tremor; RUE - 4/5 strength and RLE extremity also 4/5; full strengths on LUE and LLE   Skin: Skin is warm and dry. No rash noted.       Laboratory Data:  Lab Results   Component Value Date    WBC 2.84 (L) 10/15/2019    HGB 9.7 (L) 10/15/2019    HCT 34.5 (L) 10/15/2019    MCV 76 (L) 10/15/2019     10/15/2019       Gran # (ANC)   Date Value Ref Range Status   10/15/2019 1.7 (L) 1.8 - 7.7 K/uL Final     Gran%   Date Value Ref Range Status   10/15/2019 59.5 38.0 - 73.0 % Final     CMP  Sodium   Date Value Ref Range Status   10/15/2019 145 136 - 145 mmol/L Final     Potassium   Date Value Ref Range Status   10/15/2019 3.6 3.5 - 5.1 mmol/L Final     Chloride   Date Value Ref Range Status   10/15/2019 109 95 - 110 mmol/L Final     CO2   Date Value Ref Range Status   10/15/2019 28 23 - 29 mmol/L Final     Glucose   Date Value Ref Range Status   10/15/2019 97 70 - 110 mg/dL Final     BUN, Bld   Date Value Ref Range Status   10/15/2019 8 8 - 23 mg/dL Final     Creatinine   Date Value Ref Range Status    10/15/2019 0.8 0.5 - 1.4 mg/dL Final     Calcium   Date Value Ref Range Status   10/15/2019 8.5 (L) 8.7 - 10.5 mg/dL Final     Total Protein   Date Value Ref Range Status   10/15/2019 6.6 6.0 - 8.4 g/dL Final     Albumin   Date Value Ref Range Status   10/15/2019 3.6 3.5 - 5.2 g/dL Final     Total Bilirubin   Date Value Ref Range Status   10/15/2019 0.5 0.1 - 1.0 mg/dL Final     Comment:     For infants and newborns, interpretation of results should be based  on gestational age, weight and in agreement with clinical  observations.  Premature Infant recommended reference ranges:  Up to 24 hours.............<8.0 mg/dL  Up to 48 hours............<12.0 mg/dL  3-5 days..................<15.0 mg/dL  6-29 days.................<15.0 mg/dL       Alkaline Phosphatase   Date Value Ref Range Status   10/15/2019 64 55 - 135 U/L Final     AST   Date Value Ref Range Status   10/15/2019 13 10 - 40 U/L Final     ALT   Date Value Ref Range Status   10/15/2019 14 10 - 44 U/L Final     Anion Gap   Date Value Ref Range Status   10/15/2019 8 8 - 16 mmol/L Final     eGFR if    Date Value Ref Range Status   10/15/2019 >60.0 >60 mL/min/1.73 m^2 Final     eGFR if non    Date Value Ref Range Status   10/15/2019 >60.0 >60 mL/min/1.73 m^2 Final     Comment:     Calculation used to obtain the estimated glomerular filtration  rate (eGFR) is the CKD-EPI equation.        [place] Free Light Chains   Date Value Ref Range Status   10/15/2019 1.76 0.33 - 1.94 mg/dL Final   10/10/2019 1.66 0.33 - 1.94 mg/dL Final   09/19/2019 1.71 0.33 - 1.94 mg/dL Final     [place] Free Light Chains   Date Value Ref Range Status   10/15/2019 0.93 0.57 - 2.63 mg/dL Final   10/10/2019 0.96 0.57 - 2.63 mg/dL Final   09/19/2019 1.07 0.57 - 2.63 mg/dL Final     Kappa/Lambda FLC Ratio   Date Value Ref Range Status   10/15/2019 1.89 (H) 0.26 - 1.65 Final   10/10/2019 1.73 (H) 0.26 - 1.65 Final   09/19/2019 1.60 0.26 - 1.65 Final     IgG - Serum    Date Value Ref Range Status   10/15/2019 830 650 - 1600 mg/dL Final     Comment:     IgG Cord Blood Reference Range: 650-1600 mg/dL.     IgA   Date Value Ref Range Status   10/15/2019 63 40 - 350 mg/dL Final     Comment:     IgA Cord Blood Reference Range: <5 mg/dL.     IgM   Date Value Ref Range Status   10/15/2019 20 (L) 50 - 300 mg/dL Final     Comment:     IgM Cord Blood Reference Range: <25 mg/dL.         Assessment:       1. Multiple myeloma, remission status unspecified    2. Stem cell transplant candidate    3. Abnormal positron emission tomography (PET) scan    4. Abnormal echocardiogram    5. Abnormal pulmonary function test    6. 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) High Risk disease  -She has achieved and tolerated well VRd x completing 7, 28 day cycles and achieving deep VGPR last dose 10/31/19  -adequate response to proceed with autoSCT  -she has PS 1-2 and multiple abnormalities in her pre transplant eval that will need to be addressed   1)abnormal PFT's   -completing eval with pulm   2)abnormal TTE- seeing cardiology   3)abnormal PET -seeing gyne for endometrial thickening   5)abnormal MRI liver-  Will confirm with hepatology no further rascon needed in light of recent stable mri liver sept 2019  -plan to hold myeloma therapy over next 2 weeks while undergoing above evaluation  -explained that in light of above may not be able to transplant safely and will then likely recommend velcade maintenance q 2 week until progression         Lower extremity edema, worse on L leg  - DVT has been ruled out, better during this visit  -continue dexmethasone to 20 mg  - continue compression stockings     Nausea/ vomiting  - controlled on zofran at first sign of nausea  - will add secondary medications for additional control too if this becomes worse or uncontrolled      Malnutrition/loss of  appetite  - discussed with SW about arranging pt with boost/ensure      Anemia  - low ferritin in years past 4-8, currently is 54 on 4/30/19 while on iron supplementation  - prior GI eval in 2015 and 16  - will continue to monitor for worsening anemia and signs sx of bleeding     FUENTES  - 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  At  diagnosis, this translocation has an unfavorable prognosis in multiple myeloma (Correa et al., Blood [id number], 2003).  - 14:20 translocation will place this as R-ISS Stage III, high risk disease      FU: cbc, cmp, serum free light chains, quantitative immunoglobulins, serum electropheresis, serum immunofixation and appt with me in 2 weeks; BMT coordinator to schedule consultant appts

## 2019-11-06 ENCOUNTER — INITIAL CONSULT (OUTPATIENT)
Dept: GYNECOLOGIC ONCOLOGY | Facility: CLINIC | Age: 69
End: 2019-11-06
Payer: MEDICARE

## 2019-11-06 ENCOUNTER — TELEPHONE (OUTPATIENT)
Dept: GYNECOLOGIC ONCOLOGY | Facility: CLINIC | Age: 69
End: 2019-11-06

## 2019-11-06 VITALS
HEART RATE: 73 BPM | BODY MASS INDEX: 28.71 KG/M2 | SYSTOLIC BLOOD PRESSURE: 139 MMHG | HEIGHT: 63 IN | DIASTOLIC BLOOD PRESSURE: 72 MMHG | WEIGHT: 162.06 LBS

## 2019-11-06 DIAGNOSIS — C90.00 MULTIPLE MYELOMA, REMISSION STATUS UNSPECIFIED: ICD-10-CM

## 2019-11-06 DIAGNOSIS — R93.89 THICKENED ENDOMETRIUM: Primary | ICD-10-CM

## 2019-11-06 PROCEDURE — 99999 PR PBB SHADOW E&M-EST. PATIENT-LVL III: CPT | Mod: PBBFAC,HCNC,, | Performed by: OBSTETRICS & GYNECOLOGY

## 2019-11-06 PROCEDURE — 99204 OFFICE O/P NEW MOD 45 MIN: CPT | Mod: HCNC,25,S$GLB, | Performed by: OBSTETRICS & GYNECOLOGY

## 2019-11-06 PROCEDURE — 88305 TISSUE EXAM BY PATHOLOGIST: CPT | Mod: HCNC | Performed by: PATHOLOGY

## 2019-11-06 PROCEDURE — 3075F PR MOST RECENT SYSTOLIC BLOOD PRESS GE 130-139MM HG: ICD-10-PCS | Mod: HCNC,CPTII,S$GLB, | Performed by: OBSTETRICS & GYNECOLOGY

## 2019-11-06 PROCEDURE — 99499 UNLISTED E&M SERVICE: CPT | Mod: HCNC,BMT,S$GLB, | Performed by: OBSTETRICS & GYNECOLOGY

## 2019-11-06 PROCEDURE — 1101F PT FALLS ASSESS-DOCD LE1/YR: CPT | Mod: HCNC,CPTII,S$GLB, | Performed by: OBSTETRICS & GYNECOLOGY

## 2019-11-06 PROCEDURE — 3078F PR MOST RECENT DIASTOLIC BLOOD PRESSURE < 80 MM HG: ICD-10-PCS | Mod: HCNC,CPTII,S$GLB, | Performed by: OBSTETRICS & GYNECOLOGY

## 2019-11-06 PROCEDURE — 3078F DIAST BP <80 MM HG: CPT | Mod: HCNC,CPTII,S$GLB, | Performed by: OBSTETRICS & GYNECOLOGY

## 2019-11-06 PROCEDURE — 99999 PR PBB SHADOW E&M-EST. PATIENT-LVL III: ICD-10-PCS | Mod: PBBFAC,HCNC,, | Performed by: OBSTETRICS & GYNECOLOGY

## 2019-11-06 PROCEDURE — 58100 BIOPSY OF UTERUS LINING: CPT | Mod: HCNC,S$GLB,, | Performed by: OBSTETRICS & GYNECOLOGY

## 2019-11-06 PROCEDURE — 99204 PR OFFICE/OUTPT VISIT, NEW, LEVL IV, 45-59 MIN: ICD-10-PCS | Mod: HCNC,25,S$GLB, | Performed by: OBSTETRICS & GYNECOLOGY

## 2019-11-06 PROCEDURE — 1101F PR PT FALLS ASSESS DOC 0-1 FALLS W/OUT INJ PAST YR: ICD-10-PCS | Mod: HCNC,CPTII,S$GLB, | Performed by: OBSTETRICS & GYNECOLOGY

## 2019-11-06 PROCEDURE — 3075F SYST BP GE 130 - 139MM HG: CPT | Mod: HCNC,CPTII,S$GLB, | Performed by: OBSTETRICS & GYNECOLOGY

## 2019-11-06 PROCEDURE — 99499 RISK ADDL DX/OHS AUDIT: ICD-10-PCS | Mod: HCNC,BMT,S$GLB, | Performed by: OBSTETRICS & GYNECOLOGY

## 2019-11-06 PROCEDURE — 88305 TISSUE SPECIMEN TO PATHOLOGY: ICD-10-PCS | Mod: 26,HCNC,, | Performed by: PATHOLOGY

## 2019-11-06 PROCEDURE — 58100 BIOPSY (GYNECOLOGICAL): ICD-10-PCS | Mod: HCNC,S$GLB,, | Performed by: OBSTETRICS & GYNECOLOGY

## 2019-11-06 PROCEDURE — 88305 TISSUE EXAM BY PATHOLOGIST: CPT | Mod: 26,HCNC,, | Performed by: PATHOLOGY

## 2019-11-06 NOTE — PROGRESS NOTES
Subjective:      Patient ID: Shelby Thompson is a 69 y.o. female.    Chief Complaint: new consult      HPI  Referred by Dr. Jerez for thickened endometrium on PET findings.     Sees Dr. Jerez for multiple myeloma and is under consideration for stem cell transplant.   She denies postmenopausal bleeding. Denies history of abnormal pap smears.  x 2.     PET 19  Endometrium appears thickened and contains fluid, raising the question for cervical stenosis possibly secondary to chronic inflammation/fibrosis with neoplasm not excluded.  Uterine fibroids are again seen.    Review of Systems   Constitutional: Negative for appetite change, chills, fatigue and fever.   HENT: Negative for mouth sores.    Respiratory: Negative for cough and shortness of breath.    Cardiovascular: Negative for leg swelling.   Gastrointestinal: Negative for abdominal pain, blood in stool, constipation and diarrhea.   Endocrine: Negative for cold intolerance.   Genitourinary: Negative for dysuria and vaginal bleeding.   Musculoskeletal: Negative for myalgias.   Skin: Negative for rash.   Allergic/Immunologic: Negative.    Neurological: Negative for weakness and numbness.   Hematological: Negative for adenopathy. Does not bruise/bleed easily.   Psychiatric/Behavioral: Negative for confusion.       Past Medical History:   Diagnosis Date    Acute respiratory failure with hypoxia 2019    FUENTES (acute kidney injury) 2019    Allergy     Anemia     Aortic aneurysm     Breast cancer 10/2011    left breast Stage 0 DCIS    Chronic diastolic congestive heart failure 2015    Colon polyp     GERD (gastroesophageal reflux disease)     History of colonic polyps     Hx of psychiatric care     Zoloft    HX: breast cancer     Hyperlipemia     Hypertension     ICH (intracerebral hemorrhage)     Major depressive disorder, single episode, mild 2016    Nuclear sclerosis 2014    Open angle with borderline findings  and low 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 7/21/2014    PAD (peripheral artery disease) 11/6/2015    Psychiatric problem     Statin-induced rhabdomyolysis     4/2015     Stroke 4/2011     Past Surgical History:   Procedure Laterality Date    APPENDECTOMY      BONE MARROW BIOPSY Left 10/3/2019    Procedure: Biopsy-bone marrow;  Surgeon: Jere Tineo MD;  Location: [facility] OR [address] [place] [address];  Service: Oncology;  Laterality: Left;    BREAST BIOPSY Left 10/2011    BREAST LUMPECTOMY Left 2011    DCIS    COLONOSCOPY      CYST REMOVAL      back    ESOPHAGOGASTRODUODENOSCOPY  07/2016    duodenal angioectasia    FOOT FRACTURE SURGERY  10/2012    right foot, with R hallux valgus repair    FRACTURE SURGERY Right     broken toe repiar    HEMORRHOID SURGERY      LIVER BIOPSY  04/2015    essentially normal, no fibrosis    TUBAL LIGATION      UPPER GASTROINTESTINAL ENDOSCOPY       Family History   Problem Relation Age of Onset    No Known Problems Sister     Cancer Sister 63        Lung Cancer    No Known Problems Mother     Cancer Father     No Known Problems Brother     Hypertension Daughter     Fibroids Daughter         uterine    Hypertension Son     Breast cancer Sister 62    No Known Problems Brother     No Known Problems Maternal Aunt     No Known Problems Maternal Uncle     No Known Problems Paternal Aunt     No Known Problems Paternal Uncle     Hypertension Maternal Grandmother     No Known Problems Maternal Grandfather     No Known Problems Paternal Grandmother     No Known Problems Paternal Grandfather     Ovarian cancer Neg Hx     Colon cancer Neg Hx     Tremor Neg Hx     Amblyopia Neg Hx     Blindness Neg Hx     Cataracts Neg Hx     Diabetes Neg Hx     Glaucoma Neg Hx     Macular degeneration Neg Hx     Retinal detachment Neg Hx     Strabismus Neg Hx     Stroke Neg Hx     Thyroid disease Neg Hx     Esophageal cancer Neg Hx     Rectal cancer Neg Hx     Stomach cancer Neg Hx      Ulcerative colitis Neg Hx     Crohn's disease Neg Hx     Irritable bowel syndrome Neg Hx     Celiac disease Neg Hx      Social History     Socioeconomic History    Marital status:      Spouse name: Moises    Number of children: 2    Years of education: Not on file    Highest education level: Not on file   Occupational History    Not on file   Social Needs    Financial resource strain: Not on file    Food insecurity:     Worry: Not on file     Inability: Not on file    Transportation needs:     Medical: Not on file     Non-medical: Not on file   Tobacco Use    Smoking status: Former Smoker     Packs/day: 0.50     Years: 20.00     Pack years: 10.00     Types: Cigarettes     Last attempt to quit: 2011     Years since quittin.6    Smokeless tobacco: Former User     Quit date: 4/3/2011   Substance and Sexual Activity    Alcohol use: Yes     Comment: on occasion - one glass wine every 3 months    Drug use: No    Sexual activity: Yes     Partners: Male     Birth control/protection: Post-menopausal   Lifestyle    Physical activity:     Days per week: Not on file     Minutes per session: Not on file    Stress: Not on file   Relationships    Social connections:     Talks on phone: Not on file     Gets together: Not on file     Attends Caoda[employer] service: Not on file     Active member of club or organization: Not on file     Attends meetings of clubs or organizations: Not on file     Relationship status: Not on file   Other Topics Concern    Patient feels they ought to cut down on drinking/drug use Not Asked    Patient annoyed by others criticizing their drinking/drug use Not Asked    Patient has felt bad or guilty about drinking/drug use Not Asked    Patient has had a drink/used drugs as an eye opener in the AM Not Asked   Social History Narrative     -Moises    2 children (Delia Li)     Current Outpatient Medications   Medication Sig    acetaminophen (TYLENOL) 650 MG TbSR  Take 1,300 mg by mouth daily as needed (pain).    acyclovir (ZOVIRAX) 400 MG tablet Take 1 tablet (400 mg total) by mouth 2 (two) times daily.    alirocumab (PRALUENT PEN) 75 mg/mL PnIj Inject 1 mL (75 mg total) into the skin every 14 (fourteen) days. Ask your PCP/ Oncologist    allopurinol (ZYLOPRIM) 300 MG tablet Take 1 tablet (300 mg total) by mouth once daily.    amLODIPine (NORVASC) 5 MG tablet TAKE 1 TABLET BY MOUTH EVERY DAY    aspirin 81 mg Tab Take 1 tablet by mouth Daily.    bisacodyl (DULCOLAX) 5 mg EC tablet Take 5 mg by mouth once daily.    dexAMETHasone (DECADRON) 4 MG Tab Take 5 tablets (20 mg total) by mouth once a week. Take 5 tabs or 20 mg weekly, all tabs on same day as weekly Velcade injection    ferrous gluconate 324 mg (37.5 mg iron) Tab TAKE 1 TABLET BY MOUTH 2 (TWO) TIMES DAILY WITH MEALS.    FLUZONE HIGH-DOSE 2019-20, PF, 180 mcg/0.5 mL Syrg TO BE ADMINISTERED BY PHARMACIST FOR IMMUNIZATION    gabapentin (NEURONTIN) 300 MG capsule TAKE 1 CAPSULE (300 MG TOTAL) BY MOUTH 2 (TWO) TIMES DAILY.    lidocaine (LIDODERM) 5 % Place 1 patch onto the skin once daily. Remove & Discard patch within 12 hours or as directed by MD    omeprazole (PRILOSEC) 40 MG capsule TAKE 1 CAPSULE BY MOUTH EVERY DAY    ondansetron (ZOFRAN) 4 MG tablet Take 1 tablet (4 mg total) by mouth every 6 (six) hours as needed for Nausea.    ondansetron (ZOFRAN-ODT) 8 MG TbDL Take 1 tablet (8 mg total) by mouth every 8 (eight) hours as needed (nausea/vomiting).    REVLIMID 25 mg Cap TAKE 1 CAPSULE BY MOUTH EVERY DAY    sertraline (ZOLOFT) 50 MG tablet TAKE 1 TABLET (50 MG TOTAL) BY MOUTH ONCE DAILY.    traMADol (ULTRAM) 50 mg tablet Take 1 tablet (50 mg total) by mouth 2 (two) times daily as needed for Pain.    vitamin D 1000 units Tab Take 185 mg by mouth once daily.     Current Facility-Administered Medications   Medication    zoledronic acid (ZOMETA) 4 mg in sodium chloride 0.9% 100 mL IVPB     Review of  patient's allergies indicates:   Allergen Reactions    Lipitor [atorvastatin]      Itching, elevated cpk, muscle aches, statin induced myositis       Objective:   Physical Exam:   Constitutional: She is oriented to person, place, and time. She appears well-developed and well-nourished.    HENT:   Head: Normocephalic and atraumatic.    Eyes: Pupils are equal, round, and reactive to light. EOM are normal.    Neck: Normal range of motion. Neck supple. No thyromegaly present.    Cardiovascular: Normal rate, regular rhythm and intact distal pulses.     Pulmonary/Chest: Effort normal and breath sounds normal. No respiratory distress. She has no wheezes.        Abdominal: Soft. Bowel sounds are normal. She exhibits no distension, no ascites and no mass. There is no tenderness.     Genitourinary: Rectum normal, vagina normal and uterus normal. Pelvic exam was performed with patient supine. There is no lesion on the right labia. There is no lesion on the left labia. Uterus is not fixed. Cervix is normal. Right adnexum displays no mass. Left adnexum displays no mass.        Uterus Size: 10 cm   Musculoskeletal: Normal range of motion and moves all extremeties.      Lymphadenopathy:     She has no cervical adenopathy.        Right: No inguinal and no supraclavicular adenopathy present.        Left: No inguinal and no supraclavicular adenopathy present.    Neurological: She is alert and oriented to person, place, and time.    Skin: Skin is warm and dry. No rash noted.    Psychiatric: She has a normal mood and affect.       Assessment:     1. Thickened endometrium    2. Multiple myeloma, remission status unspecified        Plan:     Orders Placed This Encounter   Procedures    Biopsy (Gynecological)       I discussed with the patient the differential diagnosis for thickened endometrium in a postmenopausal woman including benign, hyperplastic, or malignant process. Incidental finding on PET obtained for multiple myeloma. No  postmenopausal bleeding. Endometrial biopsy performed today for diagnostic purposes. Further treatment planning pending results. If results are benign and she is asymptomatic will not require further treatment. If results of in office biopsy are inconclusive may consider further evaluation with D&C hysteroscopy.      Will communicate with Dr. Jerez.

## 2019-11-06 NOTE — PROCEDURES
Biopsy (Gynecological)  Date/Time: 11/6/2019 1:30 PM  Performed by: Rosi Mckeon MD  Authorized by: Rosi Mckeon MD     Consent Done?:  Yes (Written)    Biopsy Location:  Uterus  Estimated blood loss (cc):  0   Patient tolerated the procedure well with no immediate complications.     Uterus sounded to 10cm, multiple passes made with pipelle. Moderate tissue and mucous obtained.

## 2019-11-06 NOTE — TELEPHONE ENCOUNTER
----- Message from Rosi Mckeon MD sent at 11/5/2019  4:22 PM CST -----  Happy to see her.     ----- Message -----  From: Zainab Oviedo RN  Sent: 11/5/2019   3:34 PM CST  To: Rosi Mckeon MD, Elton Gonzalez MD, #    This pt is being worked up for a stem cell transplant and her latest PET showed some endometrial thickening.  Dr. Tineo wanted this checked out before they can proceed. They would like the pt to be seen within the next one-two weeks.  Brendan will schedule if it is ok to do so.    Zainab

## 2019-11-06 NOTE — LETTER
November 6, 2019      Jere Tineo MD  [address] [facility] [address]           [facility]  [address] [address] [address]  [facility] [address]  Phone: [phone]          Patient: Shelby Thompson   MR Number: [id number]   YOB: 1950   Date of Visit: 11/6/2019       Dear Dr. Jere Tineo:    Thank you for referring Shelby Thompson to me for evaluation. Attached you will find relevant portions of my assessment and plan of care.    If you have questions, please do not hesitate to call me. I look forward to following Shelby Thompson along with you.    Sincerely,    Rosi Mckeon MD    Enclosure  CC:  No Recipients    If you would like to receive this communication electronically, please contact [email or web address] or [phone] to request more information on "[employer] [facility] [employer] [employer] [facility]" Link access.    For providers and/or their staff who would like to refer a patient to [facility], please contact us through our one-stop-shop provider referral line, [facility], at [phone].    If you feel you have received this communication in error or would no longer like to receive these types of communications, please e-mail [email or web address]

## 2019-11-11 ENCOUNTER — TELEPHONE (OUTPATIENT)
Dept: GYNECOLOGIC ONCOLOGY | Facility: CLINIC | Age: 69
End: 2019-11-11

## 2019-11-11 NOTE — TELEPHONE ENCOUNTER
Called to review benign endometrial biopsy with patient. She voiced understanding. Will communicate with Dr. Tineo as well.

## 2019-11-13 ENCOUNTER — PATIENT OUTREACH (OUTPATIENT)
Dept: ADMINISTRATIVE | Facility: OTHER | Age: 69
End: 2019-11-13

## 2019-11-18 ENCOUNTER — OFFICE VISIT (OUTPATIENT)
Dept: CARDIOLOGY | Facility: CLINIC | Age: 69
End: 2019-11-18
Payer: MEDICARE

## 2019-11-18 VITALS
HEIGHT: 63 IN | HEART RATE: 86 BPM | BODY MASS INDEX: 28.64 KG/M2 | SYSTOLIC BLOOD PRESSURE: 136 MMHG | WEIGHT: 161.63 LBS | OXYGEN SATURATION: 92 % | DIASTOLIC BLOOD PRESSURE: 90 MMHG

## 2019-11-18 DIAGNOSIS — C90.00 MULTIPLE MYELOMA, REMISSION STATUS UNSPECIFIED: ICD-10-CM

## 2019-11-18 DIAGNOSIS — R06.09 DOE (DYSPNEA ON EXERTION): ICD-10-CM

## 2019-11-18 DIAGNOSIS — Z01.810 PRE-OPERATIVE CARDIOVASCULAR EXAMINATION: ICD-10-CM

## 2019-11-18 DIAGNOSIS — Z78.9 STATIN INTOLERANCE: ICD-10-CM

## 2019-11-18 DIAGNOSIS — I10 ESSENTIAL HYPERTENSION: ICD-10-CM

## 2019-11-18 DIAGNOSIS — I73.9 PAD (PERIPHERAL ARTERY DISEASE): Primary | ICD-10-CM

## 2019-11-18 DIAGNOSIS — I73.9 CLAUDICATION: ICD-10-CM

## 2019-11-18 DIAGNOSIS — E78.00 PURE HYPERCHOLESTEROLEMIA: ICD-10-CM

## 2019-11-18 PROCEDURE — 3080F PR MOST RECENT DIASTOLIC BLOOD PRESSURE >= 90 MM HG: ICD-10-PCS | Mod: HCNC,CPTII,S$GLB, | Performed by: INTERNAL MEDICINE

## 2019-11-18 PROCEDURE — 99214 OFFICE O/P EST MOD 30 MIN: CPT | Mod: HCNC,S$GLB,, | Performed by: INTERNAL MEDICINE

## 2019-11-18 PROCEDURE — 1126F AMNT PAIN NOTED NONE PRSNT: CPT | Mod: HCNC,S$GLB,, | Performed by: INTERNAL MEDICINE

## 2019-11-18 PROCEDURE — 1101F PR PT FALLS ASSESS DOC 0-1 FALLS W/OUT INJ PAST YR: ICD-10-PCS | Mod: HCNC,CPTII,S$GLB, | Performed by: INTERNAL MEDICINE

## 2019-11-18 PROCEDURE — 3075F PR MOST RECENT SYSTOLIC BLOOD PRESS GE 130-139MM HG: ICD-10-PCS | Mod: HCNC,CPTII,S$GLB, | Performed by: INTERNAL MEDICINE

## 2019-11-18 PROCEDURE — 1101F PT FALLS ASSESS-DOCD LE1/YR: CPT | Mod: HCNC,CPTII,S$GLB, | Performed by: INTERNAL MEDICINE

## 2019-11-18 PROCEDURE — 99214 PR OFFICE/OUTPT VISIT, EST, LEVL IV, 30-39 MIN: ICD-10-PCS | Mod: HCNC,S$GLB,, | Performed by: INTERNAL MEDICINE

## 2019-11-18 PROCEDURE — 99999 PR PBB SHADOW E&M-EST. PATIENT-LVL V: ICD-10-PCS | Mod: PBBFAC,HCNC,, | Performed by: INTERNAL MEDICINE

## 2019-11-18 PROCEDURE — 99999 PR PBB SHADOW E&M-EST. PATIENT-LVL V: CPT | Mod: PBBFAC,HCNC,, | Performed by: INTERNAL MEDICINE

## 2019-11-18 PROCEDURE — 1159F PR MEDICATION LIST DOCUMENTED IN MEDICAL RECORD: ICD-10-PCS | Mod: HCNC,S$GLB,, | Performed by: INTERNAL MEDICINE

## 2019-11-18 PROCEDURE — 1126F PR PAIN SEVERITY QUANTIFIED, NO PAIN PRESENT: ICD-10-PCS | Mod: HCNC,S$GLB,, | Performed by: INTERNAL MEDICINE

## 2019-11-18 PROCEDURE — 1159F MED LIST DOCD IN RCRD: CPT | Mod: HCNC,S$GLB,, | Performed by: INTERNAL MEDICINE

## 2019-11-18 PROCEDURE — 3080F DIAST BP >= 90 MM HG: CPT | Mod: HCNC,CPTII,S$GLB, | Performed by: INTERNAL MEDICINE

## 2019-11-18 PROCEDURE — 3075F SYST BP GE 130 - 139MM HG: CPT | Mod: HCNC,CPTII,S$GLB, | Performed by: INTERNAL MEDICINE

## 2019-11-18 NOTE — PROGRESS NOTES
Cardiology Consults Clinic Note  Reason for Visit:  follow-up of Claudication, ACEVEDO, and fatigue, and pre-op clearance.     HPI:   Shelby Thompson is a 69 y.o. female with history of PAD, grade II diastolic dysfunction, hyperlipidemia, hypertension, statin intolerance, COPD, CVA with residual defect, and most recently being treated for multiple myeloma.      Ms. Thompson is seen in clinic today for management of PAD, HTN, HLD, and to obtain cardiac clearance to proceed with BMT.   Denies chest pain at rest or with activity and SOB at rest. Reports ACEVEDO and transient palpitations.  Denies PND/orthopnea, lightheadedness and syncope.   Denies claudication. Does note some swelling in lower extremities.   Exercise CAILIN 11/12/2018 was normal.       Revised Cardiac Risk Index for Pre-Operative Risk Yes +1 No 0   1. High-risk surgery: Intraperitoneal; intrathoracic; suprainguinal vascular    x   2. History of ischemic heart disease:  History of myocardial infarction (MI); history of positive exercise test; current chest paint considered due to myocardial ischemia; use of nitrate therapy or ECG with pathological Q waves    x   3. History of congestive heart failure:  Pulmonary edema, bilateral rales or S3 gallop; paroxysmal nocturnal dyspnea; chest x-ray (CXR) showing pulmonary vascular redistribution   x    4. History of cerebrovascular disease: Prior transient ischemic attack (TIA) or stroke   x    5.  Pre-operative treatment with insulin  x   6.  Creatinine >2  x     Total Risk for favian-operative major cardiac event, 6.6%*    Ms. Thompson has known cardiac risk factors and active cardiac conditions including diastolic dysfunction and prior hx of CVA.    Pt requires further cardiac evaluation prior to undergoing BMT.  Aspirin therapy may be held prior to procedure but should be restarted as soon as safely possible.  The remaining cardiac meds can be held as needed but should also be restarted after the procedure. These  recommendations follow the most current Guideline on Perioperative Cardiovascular Evaluation and Management of Patients Undergoing Noncardiac Surgery released by the ACC/AHA. (JACC 2014.07.944).        ROS:    Review of Systems   Constitution: Negative for decreased appetite, fever, malaise/fatigue and weight gain.   HENT: Negative for congestion, hearing loss and nosebleeds.    Eyes: Negative for visual disturbance.   Cardiovascular: Positive for dyspnea on exertion, leg swelling and palpitations. Negative for chest pain, irregular heartbeat and syncope.   Respiratory: Negative for cough, shortness of breath and sleep disturbances due to breathing.    Endocrine: Negative for cold intolerance and heat intolerance.   Hematologic/Lymphatic: Negative for bleeding problem. Does not bruise/bleed easily.   Gastrointestinal: Negative for bloating, abdominal pain, change in bowel habit and heartburn.   Neurological: Negative for dizziness, loss of balance and numbness.   Psychiatric/Behavioral: Negative for altered mental status. The patient is not nervous/anxious.        PMH:     Past Medical History:   Diagnosis Date    Acute respiratory failure with hypoxia 4/30/2019    FUENTES (acute kidney injury) 5/1/2019    Allergy     Anemia     Aortic aneurysm     Breast cancer 10/2011    left breast Stage 0 DCIS    Chronic diastolic congestive heart failure 11/6/2015    Colon polyp     GERD (gastroesophageal reflux disease)     History of colonic polyps     Hx of psychiatric care     Zoloft    HX: breast cancer     Hyperlipemia     Hypertension     ICH (intracerebral hemorrhage)     Major depressive disorder, single episode, mild 6/23/2016    Nuclear sclerosis 7/21/2014    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 7/21/2014    PAD (peripheral artery disease) 11/6/2015    Psychiatric problem     Statin-induced rhabdomyolysis     4/2015     Stroke 4/2011     Past Surgical History:    Procedure Laterality Date    APPENDECTOMY      BONE MARROW BIOPSY Left 10/3/2019    Procedure: Biopsy-bone marrow;  Surgeon: Jere Tineo MD;  Location: [facility] OR [address] [place] [address];  Service: Oncology;  Laterality: Left;    BREAST BIOPSY Left 10/2011    BREAST LUMPECTOMY Left 2011    DCIS    COLONOSCOPY      CYST REMOVAL      back    ESOPHAGOGASTRODUODENOSCOPY  07/2016    duodenal angioectasia    FOOT FRACTURE SURGERY  10/2012    right foot, with R hallux valgus repair    FRACTURE SURGERY Right     broken toe repiar    HEMORRHOID SURGERY      LIVER BIOPSY  04/2015    essentially normal, no fibrosis    TUBAL LIGATION      UPPER GASTROINTESTINAL ENDOSCOPY         Allergies:   Allergies and current medications updated and reviewed:  Review of patient's allergies indicates:   Allergen Reactions    Lipitor [atorvastatin]      Itching, elevated cpk, muscle aches, statin induced myositis       Medications:     Current Outpatient Medications on File Prior to Visit   Medication Sig Dispense Refill    acetaminophen (TYLENOL) 650 MG TbSR Take 1,300 mg by mouth daily as needed (pain).      alirocumab (PRALUENT PEN) 75 mg/mL PnIj Inject 1 mL (75 mg total) into the skin every 14 (fourteen) days. Ask your PCP/ Oncologist 2 mL 11    amLODIPine (NORVASC) 5 MG tablet TAKE 1 TABLET BY MOUTH EVERY DAY 90 tablet 3    aspirin 81 mg Tab Take 1 tablet by mouth Daily.      bisacodyl (DULCOLAX) 5 mg EC tablet Take 5 mg by mouth once daily.      ferrous gluconate 324 mg (37.5 mg iron) Tab TAKE 1 TABLET BY MOUTH 2 (TWO) TIMES DAILY WITH MEALS. 180 tablet 0    gabapentin (NEURONTIN) 300 MG capsule TAKE 1 CAPSULE (300 MG TOTAL) BY MOUTH 2 (TWO) TIMES DAILY. 180 capsule 3    lidocaine (LIDODERM) 5 % Place 1 patch onto the skin once daily. Remove & Discard patch within 12 hours or as directed by MD 60 patch 0    omeprazole (PRILOSEC) 40 MG capsule TAKE 1 CAPSULE BY MOUTH EVERY DAY 90 capsule 3    ondansetron (ZOFRAN) 4 MG  tablet Take 1 tablet (4 mg total) by mouth every 6 (six) hours as needed for Nausea. 30 tablet 1    ondansetron (ZOFRAN-ODT) 8 MG TbDL Take 1 tablet (8 mg total) by mouth every 8 (eight) hours as needed (nausea/vomiting). (Patient taking differently: Take 8 mg by mouth every 8 (eight) hours as needed (nausea/vomiting). Pt states she breaks in half to take 4mg after chemo) 60 tablet 1    REVLIMID 25 mg Cap TAKE 1 CAPSULE BY MOUTH EVERY DAY 21 each 0    sertraline (ZOLOFT) 50 MG tablet TAKE 1 TABLET (50 MG TOTAL) BY MOUTH ONCE DAILY. 90 tablet 3    traMADol (ULTRAM) 50 mg tablet Take 1 tablet (50 mg total) by mouth 2 (two) times daily as needed for Pain. 60 tablet 3    vitamin D 1000 units Tab Take 185 mg by mouth once daily.      [DISCONTINUED] FLUZONE HIGH-DOSE 2019-20, PF, 180 mcg/0.5 mL Syrg TO BE ADMINISTERED BY PHARMACIST FOR IMMUNIZATION  0    acyclovir (ZOVIRAX) 400 MG tablet Take 1 tablet (400 mg total) by mouth 2 (two) times daily. 120 tablet 2    allopurinol (ZYLOPRIM) 300 MG tablet Take 1 tablet (300 mg total) by mouth once daily. 30 tablet 1     Current Facility-Administered Medications on File Prior to Visit   Medication Dose Route Frequency Provider Last Rate Last Dose    zoledronic acid (ZOMETA) 4 mg in sodium chloride 0.9% 100 mL IVPB  4 mg Intravenous 1 time in Clinic/HOD Genny Napoles MD           Social History:     Social History     Socioeconomic History    Marital status:      Spouse name: Moises    Number of children: 2    Years of education: Not on file    Highest education level: Not on file   Occupational History    Not on file   Social Needs    Financial resource strain: Not on file    Food insecurity:     Worry: Not on file     Inability: Not on file    Transportation needs:     Medical: Not on file     Non-medical: Not on file   Tobacco Use    Smoking status: Former Smoker     Packs/day: 0.50     Years: 20.00     Pack years: 10.00     Types: Cigarettes      Last attempt to quit: 2011     Years since quittin.6    Smokeless tobacco: Former User     Quit date: 4/3/2011   Substance and Sexual Activity    Alcohol use: Yes     Comment: on occasion - one glass wine every 3 months    Drug use: No    Sexual activity: Yes     Partners: Male     Birth control/protection: Post-menopausal   Lifestyle    Physical activity:     Days per week: Not on file     Minutes per session: Not on file    Stress: Not on file   Relationships    Social connections:     Talks on phone: Not on file     Gets together: Not on file     Attends Sikh service: Not on file     Active member of club or organization: Not on file     Attends meetings of clubs or organizations: Not on file     Relationship status: Not on file   Other Topics Concern    Patient feels they ought to cut down on drinking/drug use Not Asked    Patient annoyed by others criticizing their drinking/drug use Not Asked    Patient has felt bad or guilty about drinking/drug use Not Asked    Patient has had a drink/used drugs as an eye opener in the AM Not Asked   Social History Narrative     -Moises    2 children (Delia Li)       Family History:     Family History   Problem Relation Age of Onset    No Known Problems Sister     Cancer Sister 63        Lung Cancer    No Known Problems Mother     Cancer Father     No Known Problems Brother     Hypertension Daughter     Fibroids Daughter         uterine    Hypertension Son     Breast cancer Sister 62    No Known Problems Brother     No Known Problems Maternal Aunt     No Known Problems Maternal Uncle     No Known Problems Paternal Aunt     No Known Problems Paternal Uncle     Hypertension Maternal Grandmother     No Known Problems Maternal Grandfather     No Known Problems Paternal Grandmother     No Known Problems Paternal Grandfather     Ovarian cancer Neg Hx     Colon cancer Neg Hx     Tremor Neg Hx     Amblyopia Neg Hx      "Blindness Neg Hx     Cataracts Neg Hx     Diabetes Neg Hx     Glaucoma Neg Hx     Macular degeneration Neg Hx     Retinal detachment Neg Hx     Strabismus Neg Hx     Stroke Neg Hx     Thyroid disease Neg Hx     Esophageal cancer Neg Hx     Rectal cancer Neg Hx     Stomach cancer Neg Hx     Ulcerative colitis Neg Hx     Crohn's disease Neg Hx     Irritable bowel syndrome Neg Hx     Celiac disease Neg Hx        Physical Exam:   BP (!) 136/90   Pulse 86   Ht 5' 3" (1.6 m)   Wt 73.3 kg (161 lb 9.6 oz)   LMP  (LMP Unknown)   SpO2 (!) 92%   BMI 28.63 kg/m²         Physical Exam   Constitutional: She is oriented to person, place, and time. Vital signs are normal. She appears well-developed and well-nourished.   HENT:   Head: Normocephalic.   Eyes: Pupils are equal, round, and reactive to light.   Neck: Normal range of motion. Neck supple. No JVD present. Carotid bruit is not present.   Cardiovascular: Normal rate, regular rhythm, S1 normal, S2 normal and intact distal pulses. PMI is not displaced. Exam reveals no gallop and no friction rub.   No murmur heard.  Pulses:       Carotid pulses are 2+ on the right side, and 2+ on the left side.       Radial pulses are 2+ on the right side, and 2+ on the left side.        Dorsalis pedis pulses are 2+ on the right side, and 2+ on the left side.        Posterior tibial pulses are 1+ on the right side, and 1+ on the left side.   Pulmonary/Chest: Effort normal and breath sounds normal. No accessory muscle usage. She has no decreased breath sounds. She has no wheezes.   Abdominal: Soft. Normal appearance and bowel sounds are normal. She exhibits no distension, no fluid wave and no ascites. There is no hepatosplenomegaly. There is no tenderness.   Musculoskeletal: Normal range of motion. She exhibits edema.   +2 BLE edema   Neurological: She is alert and oriented to person, place, and time. She has normal strength.   Skin: Skin is warm, dry and intact. No " ecchymosis and no rash noted. No erythema.   Psychiatric: She has a normal mood and affect. Her speech is normal and behavior is normal. Judgment and thought content normal. Cognition and memory are normal.   Vitals reviewed.      Labs:     Blood Tests:  Lab Results   Component Value Date    BNP 41 11/05/2019     10/15/2019    K 3.6 10/15/2019     10/15/2019    CO2 28 10/15/2019    BUN 8 10/15/2019    CREATININE 0.8 10/15/2019    GLU 97 10/15/2019    HGBA1C 7.4 (H) 03/27/2019    MG 1.8 10/15/2019    AST 13 10/15/2019    ALT 14 10/15/2019    ALBUMIN 3.6 10/15/2019    PROT 6.6 10/15/2019    BILITOT 0.5 10/15/2019    WBC 2.84 (L) 10/15/2019    HGB 9.7 (L) 10/15/2019    HCT 34.5 (L) 10/15/2019    MCV 76 (L) 10/15/2019     10/15/2019    INR 0.9 10/15/2019    TSH 3.268 04/30/2019       Lab Results   Component Value Date    CHOL 150 09/11/2018    HDL 43 09/11/2018    TRIG 137 09/11/2018       Lab Results   Component Value Date    LDLCALC 79.6 09/11/2018       Imaging:     Test(s) Reviewed  I have reviewed the following in detail:  [] Stress test   [] Angiography   [x] Echocardiogram   [] Other:       Echocardiogram  · Normal left ventricular systolic function. The estimated ejection fraction is 60%  · The left ventricular global longitudinal strain is -18.6%.  · Grade II (moderate) left ventricular diastolic dysfunction consistent with pseudonormalization.  · No wall motion abnormalities.  · Severe left atrial enlargement.  · Normal right ventricular systolic function.  · Mild-to-moderate mitral regurgitation.  · Mild tricuspid regurgitation.  · The estimated PA systolic pressure is 41 mm Hg  · Pulmonary hypertension present.  · Normal central venous pressure (3 mm Hg).      Assessment:     1. PAD (peripheral artery disease); chronic   2. Claudication; asymptomatic   3. ACEVEDO (dyspnea on exertion); chronic    4. Essential hypertension; controlled   5. Pre-operative cardiovascular examination    6. Statin  intolerance; PSK9 inhibitor    7. Pure hypercholesterolemia; controlled on PSK9 inhibitor    8. Multiple myeloma, remission status unspecified       Plan:     PAD (peripheral artery disease)    Claudication    ACEVEDO (dyspnea on exertion)    Essential hypertension    Pre-operative cardiovascular examination  -     Nuclear Stress - Cardiology Interpreted; Future    Statin intolerance        - Continue PSK9 inhibitor     Pure hypercholesterolemia       - Comprehensive metabolic panel           Multiple myeloma, remission status unspecified      Patient is without signs or symptoms of heart failure. Continue on current medication regimen. Will proceed with DSE in work-up for cardiac clearance needed to proceed with BMT.   Plan to check CMP while treating hypercholesterolemia.        This patient was seen and discussed with Dr. Stratton.     Virginia HOUSE DNP  11/18/2019  3:50 PM      Follow-up:     Future Appointments   Date Time Provider Department Center   11/19/2019  2:00 PM Shelley George DNP [facility]   11/25/2019  9:00 AM LAB, [facility]EMONC SAME DAY [facility]   11/25/2019 10:00 AM Jere Tineo MD [facility]   12/2/2019  7:30 AM NUCLEAR CAMERA, [facility] NUC CAM [address]   12/2/2019  7:35 AM NUCLEAR CAMERA, NOM [facility] NUC CAM [address]   12/2/2019  7:40 AM NUCLEAR CAMERA, [facility] NUC CAM [address]   3/26/2020  7:45 AM [facility]IC-MRI2 [facility]

## 2019-11-18 NOTE — LETTER
November 25, 2019      Jere Tineo MD  [address]  [facility] [address]           [facility] - Cardiology  [address]  [facility] [address]  Phone: [phone]          Patient: Shelby Thompson   MR Number: [id number]   YOB: 1950   Date of Visit: 11/18/2019       Dear Dr. Jere Tineo:    Thank you for referring Shelby Thompson to me for evaluation. Attached you will find relevant portions of my assessment and plan of care.    If you have questions, please do not hesitate to call me. I look forward to following Shelby Thompson along with you.    Sincerely,    Luz Elena Stratton MD    Enclosure  CC:  No Recipients    If you would like to receive this communication electronically, please contact [email or web address] or [phone] to request more information on [employer] Link access.    For providers and/or their staff who would like to refer a patient to [facility], please contact us through our one-stop-shop provider referral line, [facility], at [phone].    If you feel you have received this communication in error or would no longer like to receive these types of communications, please e-mail [email or web address]

## 2019-11-19 ENCOUNTER — OFFICE VISIT (OUTPATIENT)
Dept: PULMONOLOGY | Facility: CLINIC | Age: 69
End: 2019-11-19
Payer: MEDICARE

## 2019-11-19 ENCOUNTER — PATIENT OUTREACH (OUTPATIENT)
Dept: ADMINISTRATIVE | Facility: OTHER | Age: 69
End: 2019-11-19

## 2019-11-19 DIAGNOSIS — R94.2 ABNORMAL PFT: ICD-10-CM

## 2019-11-19 PROCEDURE — 1159F MED LIST DOCD IN RCRD: CPT | Mod: HCNC,S$GLB,, | Performed by: NURSE PRACTITIONER

## 2019-11-19 PROCEDURE — 1159F PR MEDICATION LIST DOCUMENTED IN MEDICAL RECORD: ICD-10-PCS | Mod: HCNC,S$GLB,, | Performed by: NURSE PRACTITIONER

## 2019-11-19 PROCEDURE — 99999 PR PBB SHADOW E&M-EST. PATIENT-LVL II: CPT | Mod: PBBFAC,HCNC,, | Performed by: NURSE PRACTITIONER

## 2019-11-19 PROCEDURE — 99214 PR OFFICE/OUTPT VISIT, EST, LEVL IV, 30-39 MIN: ICD-10-PCS | Mod: HCNC,S$GLB,, | Performed by: NURSE PRACTITIONER

## 2019-11-19 PROCEDURE — 99214 OFFICE O/P EST MOD 30 MIN: CPT | Mod: HCNC,S$GLB,, | Performed by: NURSE PRACTITIONER

## 2019-11-19 PROCEDURE — 1101F PT FALLS ASSESS-DOCD LE1/YR: CPT | Mod: HCNC,CPTII,S$GLB, | Performed by: NURSE PRACTITIONER

## 2019-11-19 PROCEDURE — 99999 PR PBB SHADOW E&M-EST. PATIENT-LVL II: ICD-10-PCS | Mod: PBBFAC,HCNC,, | Performed by: NURSE PRACTITIONER

## 2019-11-19 PROCEDURE — 1101F PR PT FALLS ASSESS DOC 0-1 FALLS W/OUT INJ PAST YR: ICD-10-PCS | Mod: HCNC,CPTII,S$GLB, | Performed by: NURSE PRACTITIONER

## 2019-11-19 NOTE — LETTER
November 19, 2019      Jere Tineo MD  [address]  [facility] [address]           [facility]  [address], [facility] [address]  [facility] [address]  Phone: [phone]  Fax: [phone]          Patient: Shelby Thompson   MR Number: [id number]   YOB: 1950   Date of Visit: 11/19/2019       Dear Dr. Jere Tineo:    Thank you for referring Shelby Thompson to me for evaluation. Attached you will find relevant portions of my assessment and plan of care.    If you have questions, please do not hesitate to call me. I look forward to following Shelby Thompson along with you.    Sincerely,    Shelley George, [facility]    Enclosure  CC:  No Recipients    If you would like to receive this communication electronically, please contact [email or web address] or [phone] to request more information on [employer] Link access.    For providers and/or their staff who would like to refer a patient to [facility], please contact us through our one-stop-shop provider referral line, [facility], at [phone].    If you feel you have received this communication in error or would no longer like to receive these types of communications, please e-mail [email or web address]

## 2019-11-19 NOTE — PROGRESS NOTES
Subjective:       Patient ID: Shelby Thompson is a 69 y.o. female.    Chief Complaint: Consult (interstitial lung disease)    HPI:   Shelby Thompson is a 69 y.o. female who presents with evaluation for ILD.  She was seen by Dr. Umaña just a couple of weeks ago. She is here for pulmonary clearance for a bone marrow transplant.   Has a runny nose since she started chemo.  She has a Hx of MM.  She has occasional dyspnea on exertion but denies other complaints.     Review of Systems   Constitutional: Negative for chills, activity change, fatigue and night sweats.   HENT: Positive for rhinorrhea. Negative for postnasal drip, trouble swallowing and congestion.    Eyes: Negative for itching.   Respiratory: Positive for dyspnea on extertion. Negative for cough, hemoptysis, sputum production, choking, chest tightness, shortness of breath, wheezing and use of rescue inhaler.    Cardiovascular: Negative for chest pain and palpitations.   Genitourinary: Negative for difficulty urinating.   Endocrine: Negative for cold intolerance and heat intolerance.    Musculoskeletal: Negative for arthralgias.   Skin: Negative for rash.   Gastrointestinal: Negative for nausea, vomiting and acid reflux.   Neurological: Negative for dizziness and light-headedness.   Hematological: Negative for adenopathy.   Psychiatric/Behavioral: Negative for sleep disturbance.         Social History     Tobacco Use    Smoking status: Former Smoker     Packs/day: 0.50     Years: 20.00     Pack years: 10.00     Types: Cigarettes     Last attempt to quit: 2011     Years since quittin.6    Smokeless tobacco: Former User     Quit date: 4/3/2011   Substance Use Topics    Alcohol use: Yes     Comment: on occasion - one glass wine every 3 months       Review of patient's allergies indicates:   Allergen Reactions    Lipitor [atorvastatin]      Itching, elevated cpk, muscle aches, statin induced myositis     Past Medical History:   Diagnosis Date     Acute respiratory failure with hypoxia 4/30/2019    FUENTES (acute kidney injury) 5/1/2019    Allergy     Anemia     Aortic aneurysm     Breast cancer 10/2011    left breast Stage 0 DCIS    Chronic diastolic congestive heart failure 11/6/2015    Colon polyp     GERD (gastroesophageal reflux disease)     History of colonic polyps     Hx of psychiatric care     Zoloft    HX: breast cancer     Hyperlipemia     Hypertension     ICH (intracerebral hemorrhage)     Major depressive disorder, single episode, mild 6/23/2016    Nuclear sclerosis 7/21/2014    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 7/21/2014    PAD (peripheral artery disease) 11/6/2015    Psychiatric problem     Statin-induced rhabdomyolysis     4/2015     Stroke 4/2011     Past Surgical History:   Procedure Laterality Date    APPENDECTOMY      BONE MARROW BIOPSY Left 10/3/2019    Procedure: Biopsy-bone marrow;  Surgeon: Jere Tineo MD;  Location: [facility] OR [address] [place] [address];  Service: Oncology;  Laterality: Left;    BREAST BIOPSY Left 10/2011    BREAST LUMPECTOMY Left 2011    DCIS    COLONOSCOPY      CYST REMOVAL      back    ESOPHAGOGASTRODUODENOSCOPY  07/2016    duodenal angioectasia    FOOT FRACTURE SURGERY  10/2012    right foot, with R hallux valgus repair    FRACTURE SURGERY Right     broken toe repiar    HEMORRHOID SURGERY      LIVER BIOPSY  04/2015    essentially normal, no fibrosis    TUBAL LIGATION      UPPER GASTROINTESTINAL ENDOSCOPY       Current Outpatient Medications on File Prior to Visit   Medication Sig    acetaminophen (TYLENOL) 650 MG TbSR Take 1,300 mg by mouth daily as needed (pain).    acyclovir (ZOVIRAX) 400 MG tablet Take 1 tablet (400 mg total) by mouth 2 (two) times daily.    alirocumab (PRALUENT PEN) 75 mg/mL PnIj Inject 1 mL (75 mg total) into the skin every 14 (fourteen) days. Ask your PCP/ Oncologist    amLODIPine (NORVASC) 5 MG tablet TAKE 1 TABLET BY MOUTH EVERY  DAY    aspirin 81 mg Tab Take 1 tablet by mouth Daily.    bisacodyl (DULCOLAX) 5 mg EC tablet Take 5 mg by mouth once daily.    ferrous gluconate 324 mg (37.5 mg iron) Tab TAKE 1 TABLET BY MOUTH 2 (TWO) TIMES DAILY WITH MEALS.    gabapentin (NEURONTIN) 300 MG capsule TAKE 1 CAPSULE (300 MG TOTAL) BY MOUTH 2 (TWO) TIMES DAILY.    lidocaine (LIDODERM) 5 % Place 1 patch onto the skin once daily. Remove & Discard patch within 12 hours or as directed by MD    omeprazole (PRILOSEC) 40 MG capsule TAKE 1 CAPSULE BY MOUTH EVERY DAY    ondansetron (ZOFRAN) 4 MG tablet Take 1 tablet (4 mg total) by mouth every 6 (six) hours as needed for Nausea.    ondansetron (ZOFRAN-ODT) 8 MG TbDL Take 1 tablet (8 mg total) by mouth every 8 (eight) hours as needed (nausea/vomiting). (Patient taking differently: Take 8 mg by mouth every 8 (eight) hours as needed (nausea/vomiting). Pt states she breaks in half to take 4mg after chemo)    REVLIMID 25 mg Cap TAKE 1 CAPSULE BY MOUTH EVERY DAY    sertraline (ZOLOFT) 50 MG tablet TAKE 1 TABLET (50 MG TOTAL) BY MOUTH ONCE DAILY.    traMADol (ULTRAM) 50 mg tablet Take 1 tablet (50 mg total) by mouth 2 (two) times daily as needed for Pain.    vitamin D 1000 units Tab Take 185 mg by mouth once daily.    allopurinol (ZYLOPRIM) 300 MG tablet Take 1 tablet (300 mg total) by mouth once daily. (Patient not taking: Reported on 11/19/2019)     Current Facility-Administered Medications on File Prior to Visit   Medication    zoledronic acid (ZOMETA) 4 mg in sodium chloride 0.9% 100 mL IVPB       Objective:      There were no vitals filed for this visit.  Physical Exam   Constitutional: She is oriented to person, place, and time. She appears well-developed and well-nourished. No distress.   HENT:   Head: Normocephalic.   Neck: Normal range of motion. Neck supple.   Cardiovascular: Normal rate and regular rhythm.   No murmur heard.  Pulmonary/Chest: Normal expansion, symmetric chest wall expansion,  effort normal and breath sounds normal. No respiratory distress. She has no decreased breath sounds. She has no wheezes. She has no rhonchi. She has no rales.   Abdominal: Soft. She exhibits no distension. There is no hepatosplenomegaly. There is no tenderness.   Musculoskeletal: Normal range of motion. She exhibits edema.   Lymphadenopathy:     She has no cervical adenopathy.   Neurological: She is alert and oriented to person, place, and time. Gait normal.   Skin: Skin is warm and dry. No cyanosis. Nails show no clubbing.   Psychiatric: She has a normal mood and affect.   Vitals reviewed.    Personal Diagnostic Review    Imaging personally reviewed with patient, CT 11/1/19  PFTs personally reviewed and discussed with patient          Assessment:     Problem List Items Addressed This Visit        Pulmonary    Abnormal PFT    Overview     DLCO impairment noted on PFTs 10/2019.  Followed by Dr. Umaña.   No significant desaturation on 6MWT, labs normal thus far         Current Assessment & Plan     Continue as now.  Case discussed with Dr. Umaña.  Ok to pursue BMT from a pulmonary perspective.  Case had been discussed with heme/onc

## 2019-11-19 NOTE — ASSESSMENT & PLAN NOTE
Continue as now.  Case discussed with Dr. Umaña.  Ok to pursue BMT from a pulmonary perspective.  Case had been discussed with heme/onc

## 2019-11-22 ENCOUNTER — PATIENT MESSAGE (OUTPATIENT)
Dept: PULMONOLOGY | Facility: CLINIC | Age: 69
End: 2019-11-22

## 2019-11-25 ENCOUNTER — OFFICE VISIT (OUTPATIENT)
Dept: HEMATOLOGY/ONCOLOGY | Facility: CLINIC | Age: 69
End: 2019-11-25
Payer: MEDICARE

## 2019-11-25 ENCOUNTER — INFUSION (OUTPATIENT)
Dept: INFUSION THERAPY | Facility: HOSPITAL | Age: 69
End: 2019-11-25
Attending: INTERNAL MEDICINE
Payer: MEDICARE

## 2019-11-25 VITALS
RESPIRATION RATE: 16 BRPM | HEIGHT: 63 IN | TEMPERATURE: 99 F | WEIGHT: 159.19 LBS | BODY MASS INDEX: 28.21 KG/M2 | DIASTOLIC BLOOD PRESSURE: 96 MMHG | OXYGEN SATURATION: 97 % | SYSTOLIC BLOOD PRESSURE: 141 MMHG | HEART RATE: 84 BPM

## 2019-11-25 DIAGNOSIS — Z76.82 STEM CELL TRANSPLANT CANDIDATE: ICD-10-CM

## 2019-11-25 DIAGNOSIS — C90.00 MULTIPLE MYELOMA NOT HAVING ACHIEVED REMISSION: Primary | ICD-10-CM

## 2019-11-25 DIAGNOSIS — D50.8 OTHER IRON DEFICIENCY ANEMIA: ICD-10-CM

## 2019-11-25 DIAGNOSIS — C90.00 MULTIPLE MYELOMA, REMISSION STATUS UNSPECIFIED: Primary | ICD-10-CM

## 2019-11-25 PROCEDURE — 99215 OFFICE O/P EST HI 40 MIN: CPT | Mod: HCNC,S$GLB,, | Performed by: INTERNAL MEDICINE

## 2019-11-25 PROCEDURE — 1159F PR MEDICATION LIST DOCUMENTED IN MEDICAL RECORD: ICD-10-PCS | Mod: HCNC,S$GLB,, | Performed by: INTERNAL MEDICINE

## 2019-11-25 PROCEDURE — 3080F DIAST BP >= 90 MM HG: CPT | Mod: HCNC,CPTII,S$GLB, | Performed by: INTERNAL MEDICINE

## 2019-11-25 PROCEDURE — 63600175 PHARM REV CODE 636 W HCPCS: Mod: JG,HCNC | Performed by: INTERNAL MEDICINE

## 2019-11-25 PROCEDURE — 1126F PR PAIN SEVERITY QUANTIFIED, NO PAIN PRESENT: ICD-10-PCS | Mod: HCNC,S$GLB,, | Performed by: INTERNAL MEDICINE

## 2019-11-25 PROCEDURE — 1101F PR PT FALLS ASSESS DOC 0-1 FALLS W/OUT INJ PAST YR: ICD-10-PCS | Mod: HCNC,CPTII,S$GLB, | Performed by: INTERNAL MEDICINE

## 2019-11-25 PROCEDURE — 99999 PR PBB SHADOW E&M-EST. PATIENT-LVL IV: ICD-10-PCS | Mod: PBBFAC,HCNC,, | Performed by: INTERNAL MEDICINE

## 2019-11-25 PROCEDURE — 1159F MED LIST DOCD IN RCRD: CPT | Mod: HCNC,S$GLB,, | Performed by: INTERNAL MEDICINE

## 2019-11-25 PROCEDURE — 3077F PR MOST RECENT SYSTOLIC BLOOD PRESSURE >= 140 MM HG: ICD-10-PCS | Mod: HCNC,CPTII,S$GLB, | Performed by: INTERNAL MEDICINE

## 2019-11-25 PROCEDURE — 99999 PR PBB SHADOW E&M-EST. PATIENT-LVL IV: CPT | Mod: PBBFAC,HCNC,, | Performed by: INTERNAL MEDICINE

## 2019-11-25 PROCEDURE — 96401 CHEMO ANTI-NEOPL SQ/IM: CPT | Mod: HCNC

## 2019-11-25 PROCEDURE — 3077F SYST BP >= 140 MM HG: CPT | Mod: HCNC,CPTII,S$GLB, | Performed by: INTERNAL MEDICINE

## 2019-11-25 PROCEDURE — 1101F PT FALLS ASSESS-DOCD LE1/YR: CPT | Mod: HCNC,CPTII,S$GLB, | Performed by: INTERNAL MEDICINE

## 2019-11-25 PROCEDURE — 3080F PR MOST RECENT DIASTOLIC BLOOD PRESSURE >= 90 MM HG: ICD-10-PCS | Mod: HCNC,CPTII,S$GLB, | Performed by: INTERNAL MEDICINE

## 2019-11-25 PROCEDURE — 1126F AMNT PAIN NOTED NONE PRSNT: CPT | Mod: HCNC,S$GLB,, | Performed by: INTERNAL MEDICINE

## 2019-11-25 PROCEDURE — 99215 PR OFFICE/OUTPT VISIT, EST, LEVL V, 40-54 MIN: ICD-10-PCS | Mod: HCNC,S$GLB,, | Performed by: INTERNAL MEDICINE

## 2019-11-25 RX ORDER — BORTEZOMIB 3.5 MG/1
1.3 INJECTION, POWDER, LYOPHILIZED, FOR SOLUTION INTRAVENOUS; SUBCUTANEOUS
Status: COMPLETED | OUTPATIENT
Start: 2019-11-25 | End: 2019-11-25

## 2019-11-25 RX ORDER — BORTEZOMIB 3.5 MG/1
1.3 INJECTION, POWDER, LYOPHILIZED, FOR SOLUTION INTRAVENOUS; SUBCUTANEOUS
Status: CANCELLED | OUTPATIENT
Start: 2019-11-25

## 2019-11-25 RX ORDER — SODIUM CHLORIDE 0.9 % (FLUSH) 0.9 %
10 SYRINGE (ML) INJECTION
Status: CANCELLED | OUTPATIENT
Start: 2019-11-25

## 2019-11-25 RX ORDER — HEPARIN 100 UNIT/ML
500 SYRINGE INTRAVENOUS
Status: CANCELLED | OUTPATIENT
Start: 2019-11-25

## 2019-11-25 RX ADMIN — BORTEZOMIB 2.4 MG: 3.5 INJECTION, POWDER, LYOPHILIZED, FOR SOLUTION INTRAVENOUS; SUBCUTANEOUS at 10:11

## 2019-11-25 NOTE — PROGRESS NOTES
PATIENT: Shelby Thompson  MRN: [id number]  DATE: 11/27/2019      Diagnosis:   1. Multiple myeloma, remission status unspecified    2. Stem cell transplant candidate        Chief Complaint: No chief complaint on file.      Oncologic History:     Multiple Myeloma- presented w CRAB criteria (Hgb 7.1, hypercalcemia, renal function - Cr of 2.7, T7 vertebral fracture)  IgG Kappa, RISS Stage III (beta-2 micro globulin of 17.5 and 14:20 translocation) High Risk disease  She has achieved and tolerated well VRd x completing 7, 28 day cycles and achieving deep VGPR'  last dose velcade 10/31/19.  Extensive PMH as below.    2 prior CVAs (one in 2008, one in 2011)  L breast cancer DCIS stage 0 (s/p lumpectomy and radiation, no endocrine tx due to CVA)  HTN  DM II  Neuropathy, b/l hands  Prior smoker, quit in 2011  Hepatic lesions - vascular per recent MRI in 09 /2019 with no changes; will confirm no further rascon needed from hep  Statin Intolerance - on praluent, PCSK9 inhibitor   HFpEF - EF 55%, diastolic dysfunction  Anxiety/Depression       at our last appt we discussed possible clifton 140mg/m2 transplatn pending adequate pre transplant.  She required cardiac, pulm, gyne clearance prior to transplatn and presents to discuss results of these evaluation.  No change in clinical status since our last appt.    Comes to clinic with her friend.             Past Medical History:   Past Medical History:   Diagnosis Date    Acute respiratory failure with hypoxia 4/30/2019    FUENTES (acute kidney injury) 5/1/2019    Allergy     Anemia     Aortic aneurysm     Breast cancer 10/2011    left breast Stage 0 DCIS    Chronic diastolic congestive heart failure 11/6/2015    Colon polyp     GERD (gastroesophageal reflux disease)     History of colonic polyps     Hx of psychiatric care     Zoloft    HX: breast cancer     Hyperlipemia     Hypertension     ICH (intracerebral hemorrhage)     Major depressive disorder, single episode, mild  6/23/2016    Nuclear sclerosis 7/21/2014    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 7/21/2014    PAD (peripheral artery disease) 11/6/2015    Psychiatric problem     Statin-induced rhabdomyolysis     4/2015     Stroke 4/2011       Past Surgical HIstory:   Past Surgical History:   Procedure Laterality Date    APPENDECTOMY      BONE MARROW BIOPSY Left 10/3/2019    Procedure: Biopsy-bone marrow;  Surgeon: Jere Tineo MD;  Location: [facility] OR [address] [place] [address];  Service: Oncology;  Laterality: Left;    BREAST BIOPSY Left 10/2011    BREAST LUMPECTOMY Left 2011    DCIS    COLONOSCOPY      CYST REMOVAL      back    ESOPHAGOGASTRODUODENOSCOPY  07/2016    duodenal angioectasia    FOOT FRACTURE SURGERY  10/2012    right foot, with R hallux valgus repair    FRACTURE SURGERY Right     broken toe repiar    HEMORRHOID SURGERY      LIVER BIOPSY  04/2015    essentially normal, no fibrosis    TUBAL LIGATION      UPPER GASTROINTESTINAL ENDOSCOPY         Family History:   Family History   Problem Relation Age of Onset    No Known Problems Sister     Cancer Sister 63        Lung Cancer    No Known Problems Mother     Cancer Father     No Known Problems Brother     Hypertension Daughter     Fibroids Daughter         uterine    Hypertension Son     Breast cancer Sister 62    No Known Problems Brother     No Known Problems Maternal Aunt     No Known Problems Maternal Uncle     No Known Problems Paternal Aunt     No Known Problems Paternal Uncle     Hypertension Maternal Grandmother     No Known Problems Maternal Grandfather     No Known Problems Paternal Grandmother     No Known Problems Paternal Grandfather     Ovarian cancer Neg Hx     Colon cancer Neg Hx     Tremor Neg Hx     Amblyopia Neg Hx     Blindness Neg Hx     Cataracts Neg Hx     Diabetes Neg Hx     Glaucoma Neg Hx     Macular degeneration Neg Hx     Retinal detachment Neg Hx     Strabismus Neg Hx      Stroke Neg Hx     Thyroid disease Neg Hx     Esophageal cancer Neg Hx     Rectal cancer Neg Hx     Stomach cancer Neg Hx     Ulcerative colitis Neg Hx     Crohn's disease Neg Hx     Irritable bowel syndrome Neg Hx     Celiac disease Neg Hx        Social History:  reports that she quit smoking about 8 years ago. Her smoking use included cigarettes. She has a 10.00 pack-year smoking history. She quit smokeless tobacco use about 8 years ago. She reports that she drinks alcohol. She reports that she does not use drugs.    Allergies:  Review of patient's allergies indicates:   Allergen Reactions    Lipitor [atorvastatin]      Itching, elevated cpk, muscle aches, statin induced myositis       Medications:  Current Outpatient Medications   Medication Sig Dispense Refill    acetaminophen (TYLENOL) 650 MG TbSR Take 1,300 mg by mouth daily as needed (pain).      acyclovir (ZOVIRAX) 400 MG tablet Take 1 tablet (400 mg total) by mouth 2 (two) times daily. 120 tablet 2    alirocumab (PRALUENT PEN) 75 mg/mL PnIj Inject 1 mL (75 mg total) into the skin every 14 (fourteen) days. Ask your PCP/ Oncologist 2 mL 11    amLODIPine (NORVASC) 5 MG tablet TAKE 1 TABLET BY MOUTH EVERY DAY 90 tablet 3    aspirin 81 mg Tab Take 1 tablet by mouth Daily.      bisacodyl (DULCOLAX) 5 mg EC tablet Take 5 mg by mouth once daily.      ferrous gluconate 324 mg (37.5 mg iron) Tab TAKE 1 TABLET BY MOUTH 2 (TWO) TIMES DAILY WITH MEALS. 180 tablet 0    gabapentin (NEURONTIN) 300 MG capsule TAKE 1 CAPSULE (300 MG TOTAL) BY MOUTH 2 (TWO) TIMES DAILY. 180 capsule 3    lidocaine (LIDODERM) 5 % Place 1 patch onto the skin once daily. Remove & Discard patch within 12 hours or as directed by MD 60 patch 0    omeprazole (PRILOSEC) 40 MG capsule TAKE 1 CAPSULE BY MOUTH EVERY DAY 90 capsule 3    ondansetron (ZOFRAN) 4 MG tablet Take 1 tablet (4 mg total) by mouth every 6 (six) hours as needed for Nausea. 30 tablet 1    ondansetron (ZOFRAN-ODT)  "8 MG TbDL Take 1 tablet (8 mg total) by mouth every 8 (eight) hours as needed (nausea/vomiting). (Patient taking differently: Take 8 mg by mouth every 8 (eight) hours as needed (nausea/vomiting). Pt states she breaks in half to take 4mg after chemo) 60 tablet 1    REVLIMID 25 mg Cap TAKE 1 CAPSULE BY MOUTH EVERY DAY 21 each 0    sertraline (ZOLOFT) 50 MG tablet TAKE 1 TABLET (50 MG TOTAL) BY MOUTH ONCE DAILY. 90 tablet 3    traMADol (ULTRAM) 50 mg tablet Take 1 tablet (50 mg total) by mouth 2 (two) times daily as needed for Pain. 60 tablet 3    vitamin D 1000 units Tab Take 185 mg by mouth once daily.      allopurinol (ZYLOPRIM) 300 MG tablet Take 1 tablet (300 mg total) by mouth once daily. 30 tablet 1     Current Facility-Administered Medications   Medication Dose Route Frequency Provider Last Rate Last Dose    zoledronic acid (ZOMETA) 4 mg in sodium chloride 0.9% 100 mL IVPB  4 mg Intravenous 1 time in Clinic/HOD Genny Napoles MD           Review of Systems   Constitutional: Negative for fatigue and unexpected weight change.   Respiratory: Negative for cough.    Cardiovascular: Negative for chest pain and palpitations.   Gastrointestinal: Negative for abdominal pain.   Genitourinary: Negative for difficulty urinating and dysuria.   Musculoskeletal: Positive for back pain and gait problem.   Skin: Negative for rash.   Neurological: Positive for tremors, weakness and numbness. Negative for headaches.   Hematological: Negative for adenopathy.       ECOG Performance Status: 1   Objective:      Vitals:   Vitals:    11/25/19 0948   BP: (!) 141/96   Pulse: 84   Resp: 16   Temp: 98.8 °F (37.1 °C)   SpO2: 97%   Weight: 72.2 kg (159 lb 2.8 oz)   Height: 5' 3" (1.6 m)             Physical Exam   Constitutional: She is oriented to person, place, and time. She appears well-developed and well-nourished. No distress.   HENT:   Head: Normocephalic and atraumatic.   Mouth/Throat: Oropharynx is clear and moist.   Eyes: " Pupils are equal, round, and reactive to light. Conjunctivae and EOM are normal.   Neck: Normal range of motion. Neck supple.   Cardiovascular: Normal rate, regular rhythm and normal heart sounds. Exam reveals no gallop and no friction rub.   No murmur heard.  Pulmonary/Chest: Effort normal and breath sounds normal. No respiratory distress. She has no wheezes. She has no rales.   Abdominal: Soft. Bowel sounds are normal. She exhibits no distension. There is no tenderness. There is no guarding.   Musculoskeletal: Normal range of motion. She exhibits no edema.   Lymphadenopathy:     She has no cervical adenopathy.   Neurological: She is alert and oriented to person, place, and time. No cranial nerve deficit.   Intentional R hand tremor; RUE - 4/5 strength and RLE extremity also 4/5; full strengths on LUE and LLE   Skin: Skin is warm and dry. No rash noted.       Laboratory Data:  Lab Results   Component Value Date    WBC 2.84 (L) 10/15/2019    HGB 9.7 (L) 10/15/2019    HCT 34.5 (L) 10/15/2019    MCV 76 (L) 10/15/2019     10/15/2019       Gran # (ANC)   Date Value Ref Range Status   11/25/2019 2.9 1.8 - 7.7 K/uL Final     Gran%   Date Value Ref Range Status   11/25/2019 65.6 38.0 - 73.0 % Final     CMP  Sodium   Date Value Ref Range Status   11/25/2019 146 (H) 136 - 145 mmol/L Final     Potassium   Date Value Ref Range Status   11/25/2019 3.4 (L) 3.5 - 5.1 mmol/L Final     Chloride   Date Value Ref Range Status   11/25/2019 106 95 - 110 mmol/L Final     CO2   Date Value Ref Range Status   11/25/2019 31 (H) 23 - 29 mmol/L Final     Glucose   Date Value Ref Range Status   11/25/2019 134 (H) 70 - 110 mg/dL Final     BUN, Bld   Date Value Ref Range Status   11/25/2019 11 8 - 23 mg/dL Final     Creatinine   Date Value Ref Range Status   11/25/2019 1.0 0.5 - 1.4 mg/dL Final     Calcium   Date Value Ref Range Status   11/25/2019 9.6 8.7 - 10.5 mg/dL Final     Total Protein   Date Value Ref Range Status   11/25/2019 7.6  6.0 - 8.4 g/dL Final     Albumin   Date Value Ref Range Status   11/25/2019 3.8 3.5 - 5.2 g/dL Final     Total Bilirubin   Date Value Ref Range Status   11/25/2019 0.4 0.1 - 1.0 mg/dL Final     Comment:     For infants and newborns, interpretation of results should be based  on gestational age, weight and in agreement with clinical  observations.  Premature Infant recommended reference ranges:  Up to 24 hours.............<8.0 mg/dL  Up to 48 hours............<12.0 mg/dL  3-5 days..................<15.0 mg/dL  6-29 days.................<15.0 mg/dL       Alkaline Phosphatase   Date Value Ref Range Status   11/25/2019 65 55 - 135 U/L Final     AST   Date Value Ref Range Status   11/25/2019 14 10 - 40 U/L Final     ALT   Date Value Ref Range Status   11/25/2019 9 (L) 10 - 44 U/L Final     Anion Gap   Date Value Ref Range Status   11/25/2019 9 8 - 16 mmol/L Final     eGFR if    Date Value Ref Range Status   11/25/2019 >60.0 >60 mL/min/1.73 m^2 Final     eGFR if non    Date Value Ref Range Status   11/25/2019 57.2 (A) >60 mL/min/1.73 m^2 Final     Comment:     Calculation used to obtain the estimated glomerular filtration  rate (eGFR) is the CKD-EPI equation.        [place] Free Light Chains   Date Value Ref Range Status   11/25/2019 1.71 0.33 - 1.94 mg/dL Final   10/15/2019 1.76 0.33 - 1.94 mg/dL Final   10/10/2019 1.66 0.33 - 1.94 mg/dL Final     Lambda Free Light Chains   Date Value Ref Range Status   11/25/2019 1.08 0.57 - 2.63 mg/dL Final   10/15/2019 0.93 0.57 - 2.63 mg/dL Final   10/10/2019 0.96 0.57 - 2.63 mg/dL Final     Kappa/Lambda FLC Ratio   Date Value Ref Range Status   11/25/2019 1.58 0.26 - 1.65 Final   10/15/2019 1.89 (H) 0.26 - 1.65 Final   10/10/2019 1.73 (H) 0.26 - 1.65 Final     IgG - Serum   Date Value Ref Range Status   11/25/2019 1115 650 - 1600 mg/dL Final     Comment:     IgG Cord Blood Reference Range: 650-1600 mg/dL.     IgA   Date Value Ref Range Status    11/25/2019 92 40 - 350 mg/dL Final     Comment:     IgA Cord Blood Reference Range: <5 mg/dL.     IgM   Date Value Ref Range Status   11/25/2019 27 (L) 50 - 300 mg/dL Final     Comment:     IgM Cord Blood Reference Range: <25 mg/dL.         Assessment:       1. Multiple myeloma, remission status unspecified    2. Stem cell transplant candidate           Plan:        Multiple Myeloma  - 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) High Risk disease  -She has achieved and tolerated well VRd x completing 7, 28 day cycles and achieving deep VGPR last dose 10/31/19  -adequate response to proceed with autoSCT  -she has PS 1-2 and multiple abnormalities in her pre transplant eval that will need to be addressed   1)abnormal PFT's   -she has been evaluated and cleared by pulm   2)abnormal TTE- seeing cardiology with DSE pending    3)abnormal PET -seen  gyne for endometrial thickening with normal endometrial biopsy so can proceed from gyne perspective   5)abnormal MRI liver- serially stable on imaging; plan for post transplant imaging; cw with benign findings   6)she has JESSE - will give one dose of iv feraheme with next velcade on 12/10/19 and will need reassessment by GI  -will give first dose of q 2 week velcade maintenance while awaiting final pretransplant eval    -will need repeat bone marrow biopsy pending clearance by cardiology given last marrow biopsy was 10/3/19 out of 42 day window  -if unable  transplant safely and will then likely recommend velcade maintenance q 2 week until progression    -supportive meds: continue acyc, asa, next zometa on 12/10/19     Lower extremity edema, worse on L leg  - DVT has been ruled out, better during this visit  -continue dexmethasone to 20 mg  - continue compression stockings     Nausea/ vomiting  - controlled on zofran at first sign of nausea  - will add secondary medications for additional control too if this becomes worse or  uncontrolled      Malnutrition/loss of appetite  - discussed with SW about arranging pt with boost/ensure      Anemia  - low ferritin in years past 4-8, currently is 54 on 4/30/19 while on iron supplementation  - prior GI eval in 2015 and 16  - will continue to monitor for worsening anemia and signs sx of bleeding       Neuropathy  -  pt has had numbness /tingling in her hands post CVA, for which she is on gabapentin 300 mg BID  - will continue to monitor            FU:   Cbc, cmp, MD appt , zometa, and iv feraheme, and velcade injection on 12/10/19 at 1 pm; ok to book into bmt service time

## 2019-11-27 ENCOUNTER — TELEPHONE (OUTPATIENT)
Dept: GASTROENTEROLOGY | Facility: CLINIC | Age: 69
End: 2019-11-27

## 2019-11-27 DIAGNOSIS — D50.0 IRON DEFICIENCY ANEMIA DUE TO CHRONIC BLOOD LOSS: ICD-10-CM

## 2019-11-27 DIAGNOSIS — Z76.82 STEM CELL TRANSPLANT CANDIDATE: ICD-10-CM

## 2019-11-27 DIAGNOSIS — C90.00 MULTIPLE MYELOMA, REMISSION STATUS UNSPECIFIED: Primary | ICD-10-CM

## 2019-11-27 RX ORDER — SODIUM CHLORIDE 0.9 % (FLUSH) 0.9 %
10 SYRINGE (ML) INJECTION
Status: CANCELLED | OUTPATIENT
Start: 2019-12-10

## 2019-11-27 RX ORDER — METHYLPREDNISOLONE SOD SUCC 125 MG
125 VIAL (EA) INJECTION ONCE AS NEEDED
Status: CANCELLED | OUTPATIENT
Start: 2019-12-10

## 2019-11-27 RX ORDER — EPINEPHRINE 0.3 MG/.3ML
0.3 INJECTION SUBCUTANEOUS ONCE AS NEEDED
Status: CANCELLED | OUTPATIENT
Start: 2019-12-10

## 2019-11-27 RX ORDER — DIPHENHYDRAMINE HYDROCHLORIDE 50 MG/ML
50 INJECTION INTRAMUSCULAR; INTRAVENOUS ONCE AS NEEDED
Status: CANCELLED | OUTPATIENT
Start: 2019-12-10

## 2019-11-27 RX ORDER — HEPARIN 100 UNIT/ML
500 SYRINGE INTRAVENOUS
Status: CANCELLED | OUTPATIENT
Start: 2019-12-10

## 2019-11-27 NOTE — TELEPHONE ENCOUNTER
----- Message from Joelle Lyman MA sent at 11/27/2019 12:20 PM CST -----  Contact: pt      ----- Message -----  From: Nicolasa Jack  Sent: 11/27/2019  11:50 AM CST  To: [facility] Clinical Staff    Pt is calling to schedule a appt with a gastro dr.  Please call pt to schedule appt.  Calendar would not allow me to schedule appt.

## 2019-11-29 ENCOUNTER — TELEPHONE (OUTPATIENT)
Dept: CARDIOLOGY | Facility: CLINIC | Age: 69
End: 2019-11-29

## 2019-12-02 ENCOUNTER — CLINICAL SUPPORT (OUTPATIENT)
Dept: CARDIOLOGY | Facility: CLINIC | Age: 69
End: 2019-12-02
Attending: NURSE PRACTITIONER
Payer: MEDICARE

## 2019-12-02 DIAGNOSIS — Z01.810 PRE-OPERATIVE CARDIOVASCULAR EXAMINATION: ICD-10-CM

## 2019-12-02 LAB
CV PHARM DOSE: 0.4 MG
CV STRESS BASE HR: 61 BPM
DIASTOLIC BLOOD PRESSURE: 83 MMHG
END DIASTOLIC INDEX-HIGH: 170 ML/M2
END SYSTOLIC INDEX-HIGH: 70 ML/M2
NUC REST DIASTOLIC VOLUME INDEX: 110
NUC REST EJECTION FRACTION: 68
NUC REST SYSTOLIC VOLUME INDEX: 35
NUC STRESS DIASTOLIC VOLUME INDEX: 125
NUC STRESS EJECTION FRACTION: 71 %
NUC STRESS SYSTOLIC VOLUME INDEX: 36
OHS CV CPX 1 MINUTE RECOVERY HEART RATE: 98 BPM
OHS CV CPX 85 PERCENT MAX PREDICTED HEART RATE MALE: 123
OHS CV CPX MAX PREDICTED HEART RATE: 144
OHS CV CPX PATIENT IS FEMALE: 1
OHS CV CPX PATIENT IS MALE: 0
OHS CV CPX PEAK DIASTOLIC BLOOD PRESSURE: 101 MMHG
OHS CV CPX PEAK HEAR RATE: 75 BPM
OHS CV CPX PEAK RATE PRESSURE PRODUCT: NORMAL
OHS CV CPX PEAK SYSTOLIC BLOOD PRESSURE: 139 MMHG
OHS CV CPX PERCENT MAX PREDICTED HEART RATE ACHIEVED: 52
OHS CV CPX RATE PRESSURE PRODUCT PRESENTING: 9028
RETIRED EF AND QEF - SEE NOTES: 51 %
STRESS ECHO TARGET HR: 128 BPM
SYSTOLIC BLOOD PRESSURE: 148 MMHG

## 2019-12-02 PROCEDURE — 78452 HT MUSCLE IMAGE SPECT MULT: CPT | Mod: HCNC,S$GLB,, | Performed by: INTERNAL MEDICINE

## 2019-12-02 PROCEDURE — A9502 TC99M TETROFOSMIN: HCPCS | Mod: HCNC,S$GLB,, | Performed by: INTERNAL MEDICINE

## 2019-12-02 PROCEDURE — 93015 CV STRESS TEST SUPVJ I&R: CPT | Mod: HCNC,S$GLB,, | Performed by: INTERNAL MEDICINE

## 2019-12-02 PROCEDURE — 93015 STRESS TEST WITH MYOCARDIAL PERFUSION (CUPID ONLY): ICD-10-PCS | Mod: HCNC,S$GLB,, | Performed by: INTERNAL MEDICINE

## 2019-12-02 PROCEDURE — 78452 STRESS TEST WITH MYOCARDIAL PERFUSION (CUPID ONLY): ICD-10-PCS | Mod: HCNC,S$GLB,, | Performed by: INTERNAL MEDICINE

## 2019-12-02 PROCEDURE — A9502 STRESS TEST WITH MYOCARDIAL PERFUSION (CUPID ONLY): ICD-10-PCS | Mod: HCNC,S$GLB,, | Performed by: INTERNAL MEDICINE

## 2019-12-02 RX ORDER — REGADENOSON 0.08 MG/ML
0.4 INJECTION, SOLUTION INTRAVENOUS
Status: COMPLETED | OUTPATIENT
Start: 2019-12-02 | End: 2019-12-02

## 2019-12-02 RX ADMIN — REGADENOSON 0.4 MG: 0.08 INJECTION, SOLUTION INTRAVENOUS at 09:12

## 2019-12-03 ENCOUNTER — TELEPHONE (OUTPATIENT)
Dept: CARDIOLOGY | Facility: CLINIC | Age: 69
End: 2019-12-03

## 2019-12-04 ENCOUNTER — TELEPHONE (OUTPATIENT)
Dept: HEMATOLOGY/ONCOLOGY | Facility: CLINIC | Age: 69
End: 2019-12-04

## 2019-12-04 NOTE — TELEPHONE ENCOUNTER
----- Message from Suni Sanchez sent at 12/4/2019 12:18 PM CST -----  Contact: Patient  Need Advice:      Reason For Call: Patient have some questions about medication. Should she take it or not      Communication Preference: [phone]  Or [phone]      Additional Information:

## 2019-12-10 ENCOUNTER — OFFICE VISIT (OUTPATIENT)
Dept: HEMATOLOGY/ONCOLOGY | Facility: CLINIC | Age: 69
End: 2019-12-10
Payer: MEDICARE

## 2019-12-10 ENCOUNTER — INFUSION (OUTPATIENT)
Dept: INFUSION THERAPY | Facility: HOSPITAL | Age: 69
End: 2019-12-10
Attending: INTERNAL MEDICINE
Payer: MEDICARE

## 2019-12-10 ENCOUNTER — TELEPHONE (OUTPATIENT)
Dept: GASTROENTEROLOGY | Facility: CLINIC | Age: 69
End: 2019-12-10

## 2019-12-10 VITALS
TEMPERATURE: 99 F | OXYGEN SATURATION: 99 % | RESPIRATION RATE: 18 BRPM | DIASTOLIC BLOOD PRESSURE: 78 MMHG | SYSTOLIC BLOOD PRESSURE: 138 MMHG | HEART RATE: 80 BPM

## 2019-12-10 VITALS
TEMPERATURE: 99 F | HEIGHT: 63 IN | DIASTOLIC BLOOD PRESSURE: 85 MMHG | HEART RATE: 83 BPM | OXYGEN SATURATION: 97 % | BODY MASS INDEX: 27.81 KG/M2 | SYSTOLIC BLOOD PRESSURE: 149 MMHG | WEIGHT: 156.94 LBS | RESPIRATION RATE: 16 BRPM

## 2019-12-10 DIAGNOSIS — D50.0 IRON DEFICIENCY ANEMIA DUE TO CHRONIC BLOOD LOSS: ICD-10-CM

## 2019-12-10 DIAGNOSIS — C90.00 MULTIPLE MYELOMA NOT HAVING ACHIEVED REMISSION: Primary | ICD-10-CM

## 2019-12-10 DIAGNOSIS — Z76.82 STEM CELL TRANSPLANT CANDIDATE: ICD-10-CM

## 2019-12-10 DIAGNOSIS — C90.00 MULTIPLE MYELOMA, REMISSION STATUS UNSPECIFIED: Primary | ICD-10-CM

## 2019-12-10 PROCEDURE — 99999 PR PBB SHADOW E&M-EST. PATIENT-LVL IV: ICD-10-PCS | Mod: PBBFAC,HCNC,, | Performed by: INTERNAL MEDICINE

## 2019-12-10 PROCEDURE — 96367 TX/PROPH/DG ADDL SEQ IV INF: CPT | Mod: HCNC

## 2019-12-10 PROCEDURE — 63600175 PHARM REV CODE 636 W HCPCS: Mod: HCNC | Performed by: INTERNAL MEDICINE

## 2019-12-10 PROCEDURE — 3074F PR MOST RECENT SYSTOLIC BLOOD PRESSURE < 130 MM HG: ICD-10-PCS | Mod: HCNC,CPTII,S$GLB, | Performed by: INTERNAL MEDICINE

## 2019-12-10 PROCEDURE — 96360 HYDRATION IV INFUSION INIT: CPT | Mod: HCNC

## 2019-12-10 PROCEDURE — 1126F AMNT PAIN NOTED NONE PRSNT: CPT | Mod: HCNC,S$GLB,, | Performed by: INTERNAL MEDICINE

## 2019-12-10 PROCEDURE — 99215 OFFICE O/P EST HI 40 MIN: CPT | Mod: HCNC,S$GLB,, | Performed by: INTERNAL MEDICINE

## 2019-12-10 PROCEDURE — 1101F PT FALLS ASSESS-DOCD LE1/YR: CPT | Mod: HCNC,CPTII,S$GLB, | Performed by: INTERNAL MEDICINE

## 2019-12-10 PROCEDURE — 1159F PR MEDICATION LIST DOCUMENTED IN MEDICAL RECORD: ICD-10-PCS | Mod: HCNC,S$GLB,, | Performed by: INTERNAL MEDICINE

## 2019-12-10 PROCEDURE — 3074F SYST BP LT 130 MM HG: CPT | Mod: HCNC,CPTII,S$GLB, | Performed by: INTERNAL MEDICINE

## 2019-12-10 PROCEDURE — 96401 CHEMO ANTI-NEOPL SQ/IM: CPT | Mod: HCNC

## 2019-12-10 PROCEDURE — 1126F PR PAIN SEVERITY QUANTIFIED, NO PAIN PRESENT: ICD-10-PCS | Mod: HCNC,S$GLB,, | Performed by: INTERNAL MEDICINE

## 2019-12-10 PROCEDURE — 1159F MED LIST DOCD IN RCRD: CPT | Mod: HCNC,S$GLB,, | Performed by: INTERNAL MEDICINE

## 2019-12-10 PROCEDURE — 3079F DIAST BP 80-89 MM HG: CPT | Mod: HCNC,CPTII,S$GLB, | Performed by: INTERNAL MEDICINE

## 2019-12-10 PROCEDURE — 99215 PR OFFICE/OUTPT VISIT, EST, LEVL V, 40-54 MIN: ICD-10-PCS | Mod: HCNC,S$GLB,, | Performed by: INTERNAL MEDICINE

## 2019-12-10 PROCEDURE — 1101F PR PT FALLS ASSESS DOC 0-1 FALLS W/OUT INJ PAST YR: ICD-10-PCS | Mod: HCNC,CPTII,S$GLB, | Performed by: INTERNAL MEDICINE

## 2019-12-10 PROCEDURE — 3079F PR MOST RECENT DIASTOLIC BLOOD PRESSURE 80-89 MM HG: ICD-10-PCS | Mod: HCNC,CPTII,S$GLB, | Performed by: INTERNAL MEDICINE

## 2019-12-10 PROCEDURE — 99999 PR PBB SHADOW E&M-EST. PATIENT-LVL IV: CPT | Mod: PBBFAC,HCNC,, | Performed by: INTERNAL MEDICINE

## 2019-12-10 RX ORDER — SODIUM CHLORIDE 0.9 % (FLUSH) 0.9 %
10 SYRINGE (ML) INJECTION
Status: CANCELLED | OUTPATIENT
Start: 2019-12-10

## 2019-12-10 RX ORDER — HEPARIN 100 UNIT/ML
500 SYRINGE INTRAVENOUS
Status: DISCONTINUED | OUTPATIENT
Start: 2019-12-10 | End: 2019-12-10 | Stop reason: HOSPADM

## 2019-12-10 RX ORDER — SODIUM CHLORIDE 0.9 % (FLUSH) 0.9 %
10 SYRINGE (ML) INJECTION
Status: DISCONTINUED | OUTPATIENT
Start: 2019-12-10 | End: 2019-12-10 | Stop reason: HOSPADM

## 2019-12-10 RX ORDER — METHYLPREDNISOLONE SOD SUCC 125 MG
125 VIAL (EA) INJECTION ONCE AS NEEDED
Status: DISCONTINUED | OUTPATIENT
Start: 2019-12-10 | End: 2019-12-10 | Stop reason: HOSPADM

## 2019-12-10 RX ORDER — DIPHENHYDRAMINE HYDROCHLORIDE 50 MG/ML
50 INJECTION INTRAMUSCULAR; INTRAVENOUS ONCE AS NEEDED
Status: CANCELLED | OUTPATIENT
Start: 2019-12-10

## 2019-12-10 RX ORDER — BORTEZOMIB 3.5 MG/1
1.3 INJECTION, POWDER, LYOPHILIZED, FOR SOLUTION INTRAVENOUS; SUBCUTANEOUS
Status: COMPLETED | OUTPATIENT
Start: 2019-12-10 | End: 2019-12-10

## 2019-12-10 RX ORDER — EPINEPHRINE 0.3 MG/.3ML
0.3 INJECTION SUBCUTANEOUS ONCE AS NEEDED
Status: DISCONTINUED | OUTPATIENT
Start: 2019-12-10 | End: 2019-12-10 | Stop reason: HOSPADM

## 2019-12-10 RX ORDER — METHYLPREDNISOLONE SOD SUCC 125 MG
125 VIAL (EA) INJECTION ONCE AS NEEDED
Status: CANCELLED | OUTPATIENT
Start: 2019-12-10

## 2019-12-10 RX ORDER — DIPHENHYDRAMINE HYDROCHLORIDE 50 MG/ML
50 INJECTION INTRAMUSCULAR; INTRAVENOUS ONCE AS NEEDED
Status: DISCONTINUED | OUTPATIENT
Start: 2019-12-10 | End: 2019-12-10 | Stop reason: HOSPADM

## 2019-12-10 RX ORDER — HEPARIN 100 UNIT/ML
500 SYRINGE INTRAVENOUS
Status: CANCELLED | OUTPATIENT
Start: 2019-12-10

## 2019-12-10 RX ORDER — EPINEPHRINE 0.3 MG/.3ML
0.3 INJECTION SUBCUTANEOUS ONCE AS NEEDED
Status: CANCELLED | OUTPATIENT
Start: 2019-12-10

## 2019-12-10 RX ORDER — BORTEZOMIB 3.5 MG/1
1.3 INJECTION, POWDER, LYOPHILIZED, FOR SOLUTION INTRAVENOUS; SUBCUTANEOUS
Status: CANCELLED | OUTPATIENT
Start: 2019-12-10

## 2019-12-10 RX ADMIN — ZOLEDRONIC ACID 3.3 MG: 4 INJECTION, SOLUTION, CONCENTRATE INTRAVENOUS at 03:12

## 2019-12-10 RX ADMIN — BORTEZOMIB 2.4 MG: 3.5 INJECTION, POWDER, LYOPHILIZED, FOR SOLUTION INTRAVENOUS; SUBCUTANEOUS at 04:12

## 2019-12-10 RX ADMIN — SODIUM CHLORIDE 1000 ML: 0.9 INJECTION, SOLUTION INTRAVENOUS at 02:12

## 2019-12-10 RX ADMIN — FERUMOXYTOL 510 MG: 510 INJECTION INTRAVENOUS at 03:12

## 2019-12-10 NOTE — Clinical Note
-coordinator to schedule colonoscopy-marrow under sedation already scheduled 12/17-schedule velcade injection on 12/24 am -cbc, cmp, serum free light chains, quantitative immunoglobulins, serum electropheresis, serum immunofixation and md appt at 4pm on 1/2/19

## 2019-12-10 NOTE — PLAN OF CARE
Pt admitted for 1L NS, Feraheme, Zometa, Velcade.  Labs reviewed and Anemia, dehydration discussed, fatigue addressed. Pt received hydration and treatment, tolerated well. Pt was observed 1 hour post Feraheme infusion for 1 hour as per protocol. Plan of care reviewed and Pt instructed to contact MD with any further concerns. AVS given to Pt and Pt discharged @ 17:00

## 2019-12-10 NOTE — TELEPHONE ENCOUNTER
First available apt arranged at [facility] gastro. Pt placed on wait list for cancellation.

## 2019-12-10 NOTE — TELEPHONE ENCOUNTER
----- Message from Williams Mir RN sent at 12/10/2019  1:33 PM CST -----  Regarding: Appt   Hey, I see the patient stated she didn't need the gastro appt but she was confused. She does need to see someone so she can get clearance for stem cell transplant. Can you please work with her to try to get her scheduled for this?

## 2019-12-10 NOTE — PROGRESS NOTES
PATIENT: Shelby Thompson  MRN: [id number]  DATE: 12/11/2019      Diagnosis:   1. Multiple myeloma, remission status unspecified    2. Stem cell transplant candidate    3. Iron deficiency anemia due to chronic blood loss        Chief Complaint: No chief complaint on file.      Oncologic History:     Multiple Myeloma- presented w CRAB criteria (Hgb 7.1, hypercalcemia, renal function - Cr of 2.7, T7 vertebral fracture)  IgG Kappa, RISS Stage III (beta-2 micro globulin of 17.5 and 14:20 translocation) High Risk disease  She has achieved and tolerated well VRd x completing 7, 28 day cycles and achieving deep VGPR'  last dose velcade 10/31/19.  Extensive PMH as below.    2 prior CVAs (one in 2008, one in 2011)  L breast cancer DCIS stage 0 (s/p lumpectomy and radiation, no endocrine tx due to CVA)  HTN  DM II  Neuropathy, b/l hands  Prior smoker, quit in 2011  Hepatic lesions - vascular per recent MRI in 09 /2019 with no changes; will confirm no further rascon needed from hep  Statin Intolerance - on praluent, PCSK9 inhibitor   HFpEF - EF 55%, diastolic dysfunction  Anxiety/Depression       at our last appt we discussed possible clifton 140mg/m2 transplatn pending adequate pre transplant. She has received cardiac, pulm, and gyne clearance for transplant.  Pending colonoscopy and GI clearance can likely proceed. Presents for her next dose of maintenance velcade, zometa, and iv iron.  Feels well .comes to clinic with friend.    Myeloma in CR as of 11/25/19 biochemical studies.              Past Medical History:   Past Medical History:   Diagnosis Date    Acute respiratory failure with hypoxia 4/30/2019    FUENTES (acute kidney injury) 5/1/2019    Allergy     Anemia     Aortic aneurysm     Breast cancer 10/2011    left breast Stage 0 DCIS    Chronic diastolic congestive heart failure 11/6/2015    Colon polyp     GERD (gastroesophageal reflux disease)     History of colonic polyps     Hx of psychiatric care     Zoloft    HX:  breast cancer     Hyperlipemia     Hypertension     ICH (intracerebral hemorrhage)     Major depressive disorder, single episode, mild 6/23/2016    Nuclear sclerosis 7/21/2014    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 7/21/2014    PAD (peripheral artery disease) 11/6/2015    Psychiatric problem     Statin-induced rhabdomyolysis     4/2015     Stroke 4/2011       Past Surgical HIstory:   Past Surgical History:   Procedure Laterality Date    APPENDECTOMY      BONE MARROW BIOPSY Left 10/3/2019    Procedure: Biopsy-bone marrow;  Surgeon: Jere Tineo MD;  Location: [facility] OR [address] [place] [address];  Service: Oncology;  Laterality: Left;    BREAST BIOPSY Left 10/2011    BREAST LUMPECTOMY Left 2011    DCIS    COLONOSCOPY      CYST REMOVAL      back    ESOPHAGOGASTRODUODENOSCOPY  07/2016    duodenal angioectasia    FOOT FRACTURE SURGERY  10/2012    right foot, with R hallux valgus repair    FRACTURE SURGERY Right     broken toe repiar    HEMORRHOID SURGERY      LIVER BIOPSY  04/2015    essentially normal, no fibrosis    TUBAL LIGATION      UPPER GASTROINTESTINAL ENDOSCOPY         Family History:   Family History   Problem Relation Age of Onset    No Known Problems Sister     Cancer Sister 63        Lung Cancer    No Known Problems Mother     Cancer Father     No Known Problems Brother     Hypertension Daughter     Fibroids Daughter         uterine    Hypertension Son     Breast cancer Sister 62    No Known Problems Brother     No Known Problems Maternal Aunt     No Known Problems Maternal Uncle     No Known Problems Paternal Aunt     No Known Problems Paternal Uncle     Hypertension Maternal Grandmother     No Known Problems Maternal Grandfather     No Known Problems Paternal Grandmother     No Known Problems Paternal Grandfather     Ovarian cancer Neg Hx     Colon cancer Neg Hx     Tremor Neg Hx     Amblyopia Neg Hx     Blindness Neg Hx     Cataracts  Neg Hx     Diabetes Neg Hx     Glaucoma Neg Hx     Macular degeneration Neg Hx     Retinal detachment Neg Hx     Strabismus Neg Hx     Stroke Neg Hx     Thyroid disease Neg Hx     Esophageal cancer Neg Hx     Rectal cancer Neg Hx     Stomach cancer Neg Hx     Ulcerative colitis Neg Hx     Crohn's disease Neg Hx     Irritable bowel syndrome Neg Hx     Celiac disease Neg Hx        Social History:  reports that she quit smoking about 8 years ago. Her smoking use included cigarettes. She has a 10.00 pack-year smoking history. She quit smokeless tobacco use about 8 years ago. She reports that she drinks alcohol. She reports that she does not use drugs.    Allergies:  Review of patient's allergies indicates:   Allergen Reactions    Lipitor [atorvastatin]      Itching, elevated cpk, muscle aches, statin induced myositis       Medications:  Current Outpatient Medications   Medication Sig Dispense Refill    acetaminophen (TYLENOL) 650 MG TbSR Take 1,300 mg by mouth daily as needed (pain).      acyclovir (ZOVIRAX) 400 MG tablet Take 1 tablet (400 mg total) by mouth 2 (two) times daily. 120 tablet 2    alirocumab (PRALUENT PEN) 75 mg/mL PnIj Inject 1 mL (75 mg total) into the skin every 14 (fourteen) days. Ask your PCP/ Oncologist 2 mL 11    allopurinol (ZYLOPRIM) 300 MG tablet Take 1 tablet (300 mg total) by mouth once daily. 30 tablet 1    amLODIPine (NORVASC) 5 MG tablet TAKE 1 TABLET BY MOUTH EVERY DAY 90 tablet 3    aspirin 81 mg Tab Take 1 tablet by mouth Daily.      bisacodyl (DULCOLAX) 5 mg EC tablet Take 5 mg by mouth once daily.      ferrous gluconate 324 mg (37.5 mg iron) Tab TAKE 1 TABLET BY MOUTH 2 (TWO) TIMES DAILY WITH MEALS. 180 tablet 0    gabapentin (NEURONTIN) 300 MG capsule TAKE 1 CAPSULE (300 MG TOTAL) BY MOUTH 2 (TWO) TIMES DAILY. 180 capsule 3    omeprazole (PRILOSEC) 40 MG capsule TAKE 1 CAPSULE BY MOUTH EVERY DAY 90 capsule 3    ondansetron (ZOFRAN) 4 MG tablet Take 1 tablet  "(4 mg total) by mouth every 6 (six) hours as needed for Nausea. 30 tablet 1    ondansetron (ZOFRAN-ODT) 8 MG TbDL Take 1 tablet (8 mg total) by mouth every 8 (eight) hours as needed (nausea/vomiting). (Patient taking differently: Take 8 mg by mouth every 8 (eight) hours as needed (nausea/vomiting). Pt states she breaks in half to take 4mg after chemo) 60 tablet 1    REVLIMID 25 mg Cap TAKE 1 CAPSULE BY MOUTH EVERY DAY 21 each 0    sertraline (ZOLOFT) 50 MG tablet TAKE 1 TABLET (50 MG TOTAL) BY MOUTH ONCE DAILY. 90 tablet 3    traMADol (ULTRAM) 50 mg tablet Take 1 tablet (50 mg total) by mouth 2 (two) times daily as needed for Pain. 60 tablet 3    vitamin D 1000 units Tab Take 185 mg by mouth once daily.       Current Facility-Administered Medications   Medication Dose Route Frequency Provider Last Rate Last Dose    zoledronic acid (ZOMETA) 4 mg in sodium chloride 0.9% 100 mL IVPB  4 mg Intravenous 1 time in Clinic/HOD Genny Napoles MD           Review of Systems   Constitutional: Negative for fatigue and unexpected weight change.   Respiratory: Negative for cough.    Cardiovascular: Negative for chest pain and palpitations.   Gastrointestinal: Negative for abdominal pain.   Genitourinary: Negative for difficulty urinating and dysuria.   Musculoskeletal: Positive for back pain and gait problem.   Skin: Negative for rash.   Neurological: Positive for tremors, weakness and numbness. Negative for headaches.   Hematological: Negative for adenopathy.       ECOG Performance Status: 1   Objective:      Vitals:   Vitals:    12/10/19 1306   BP: (!) 149/85   Pulse: 83   Resp: 16   Temp: 98.6 °F (37 °C)   TempSrc: Oral   SpO2: 97%   Weight: 71.2 kg (156 lb 15.5 oz)   Height: 5' 3" (1.6 m)             Physical Exam   Constitutional: She is oriented to person, place, and time. She appears well-developed and well-nourished. No distress.   HENT:   Head: Normocephalic and atraumatic.   Mouth/Throat: Oropharynx is clear and " moist.   Eyes: Pupils are equal, round, and reactive to light. Conjunctivae and EOM are normal.   Neck: Normal range of motion. Neck supple.   Cardiovascular: Normal rate, regular rhythm and normal heart sounds. Exam reveals no gallop and no friction rub.   No murmur heard.  Pulmonary/Chest: Effort normal and breath sounds normal. No respiratory distress. She has no wheezes. She has no rales.   Abdominal: Soft. Bowel sounds are normal. She exhibits no distension. There is no tenderness. There is no guarding.   Musculoskeletal: Normal range of motion. She exhibits no edema.   Lymphadenopathy:     She has no cervical adenopathy.   Neurological: She is alert and oriented to person, place, and time. No cranial nerve deficit.   Intentional R hand tremor; RUE - 4/5 strength and RLE extremity also 4/5; full strengths on LUE and LLE   Skin: Skin is warm and dry. No rash noted.       Laboratory Data:  Lab Results   Component Value Date    WBC 2.84 (L) 10/15/2019    HGB 9.7 (L) 10/15/2019    HCT 34.5 (L) 10/15/2019    MCV 76 (L) 10/15/2019     10/15/2019       Gran # (ANC)   Date Value Ref Range Status   12/10/2019 3.8 1.8 - 7.7 K/uL Final     Gran%   Date Value Ref Range Status   12/10/2019 77.2 (H) 38.0 - 73.0 % Final     CMP  Sodium   Date Value Ref Range Status   12/10/2019 142 136 - 145 mmol/L Final     Potassium   Date Value Ref Range Status   12/10/2019 3.4 (L) 3.5 - 5.1 mmol/L Final     Chloride   Date Value Ref Range Status   12/10/2019 105 95 - 110 mmol/L Final     CO2   Date Value Ref Range Status   12/10/2019 26 23 - 29 mmol/L Final     Glucose   Date Value Ref Range Status   12/10/2019 115 (H) 70 - 110 mg/dL Final     BUN, Bld   Date Value Ref Range Status   12/10/2019 10 8 - 23 mg/dL Final     Creatinine   Date Value Ref Range Status   12/10/2019 1.1 0.5 - 1.4 mg/dL Final     Calcium   Date Value Ref Range Status   12/10/2019 9.3 8.7 - 10.5 mg/dL Final     Total Protein   Date Value Ref Range Status    12/10/2019 8.0 6.0 - 8.4 g/dL Final     Albumin   Date Value Ref Range Status   12/10/2019 4.0 3.5 - 5.2 g/dL Final     Total Bilirubin   Date Value Ref Range Status   12/10/2019 0.6 0.1 - 1.0 mg/dL Final     Comment:     For infants and newborns, interpretation of results should be based  on gestational age, weight and in agreement with clinical  observations.  Premature Infant recommended reference ranges:  Up to 24 hours.............<8.0 mg/dL  Up to 48 hours............<12.0 mg/dL  3-5 days..................<15.0 mg/dL  6-29 days.................<15.0 mg/dL       Alkaline Phosphatase   Date Value Ref Range Status   12/10/2019 70 55 - 135 U/L Final     AST   Date Value Ref Range Status   12/10/2019 15 10 - 40 U/L Final     ALT   Date Value Ref Range Status   12/10/2019 9 (L) 10 - 44 U/L Final     Anion Gap   Date Value Ref Range Status   12/10/2019 11 8 - 16 mmol/L Final     eGFR if    Date Value Ref Range Status   12/10/2019 58.8 (A) >60 mL/min/1.73 m^2 Final     eGFR if non    Date Value Ref Range Status   12/10/2019 51.0 (A) >60 mL/min/1.73 m^2 Final     Comment:     Calculation used to obtain the estimated glomerular filtration  rate (eGFR) is the CKD-EPI equation.        [place] Free Light Chains   Date Value Ref Range Status   11/25/2019 1.71 0.33 - 1.94 mg/dL Final   10/15/2019 1.76 0.33 - 1.94 mg/dL Final   10/10/2019 1.66 0.33 - 1.94 mg/dL Final     Lambda Free Light Chains   Date Value Ref Range Status   11/25/2019 1.08 0.57 - 2.63 mg/dL Final   10/15/2019 0.93 0.57 - 2.63 mg/dL Final   10/10/2019 0.96 0.57 - 2.63 mg/dL Final     Kappa/Lambda FLC Ratio   Date Value Ref Range Status   11/25/2019 1.58 0.26 - 1.65 Final   10/15/2019 1.89 (H) 0.26 - 1.65 Final   10/10/2019 1.73 (H) 0.26 - 1.65 Final     IgG - Serum   Date Value Ref Range Status   11/25/2019 1115 650 - 1600 mg/dL Final     Comment:     IgG Cord Blood Reference Range: 650-1600 mg/dL.     IgA   Date Value Ref  Range Status   11/25/2019 92 40 - 350 mg/dL Final     Comment:     IgA Cord Blood Reference Range: <5 mg/dL.     IgM   Date Value Ref Range Status   11/25/2019 27 (L) 50 - 300 mg/dL Final     Comment:     IgM Cord Blood Reference Range: <25 mg/dL.         Assessment:       1. Multiple myeloma, remission status unspecified    2. Stem cell transplant candidate    3. Iron deficiency anemia due to chronic blood loss           Plan:        Multiple Myeloma  - 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) High Risk disease  -She has achieved and tolerated well VRd x completing 7, 28 day cycles and achieving deep VGPR last dose 10/31/19  -adequate response to proceed with autoSCT  -she has PS 1-2 and multiple abnormalities in her pre transplant eval that will need to be addressed   1)abnormal PFT's   -she has been evaluated and cleared by pulm   2)abnormal TTE- she has been cleared by cardiology    3)abnormal PET -seen  gyne for endometrial thickening with normal endometrial biopsy so can proceed from gyne perspective   5)abnormal MRI liver- serially stable on imaging; plan for post transplant imaging; cw with benign findings   6)she has JESSE - will give one dose of iv feraheme with next velcade on 12/10/19 and will need reassessment by GI; if colonosocpy normal can likely proceed with transplant   -will proceed with next dose of  q 2 week velcade maintenance while awaiting final pretransplant eval    -will need repeat bone marrow biopsy pending clearance by cardiology given last marrow biopsy was 10/3/19 out of 42 day window; scheduled for 12/17  -if unable  transplant safely and will then likely recommend velcade maintenance q 2 week until progression    -supportive meds: continue acyc, asa, next zometa  Today; resume post transplant      Lower extremity edema, worse on L leg  - DVT has been ruled out, better during this visit  -continue dexmethasone to 20 mg  - continue  compression stockings     Nausea/ vomiting  - controlled on zofran at first sign of nausea  - will add secondary medications for additional control too if this becomes worse or uncontrolled      Malnutrition/loss of appetite  - discussed with SW about arranging pt with boost/ensure      Anemia  - JESSE  -proceed with single dose feraheme today   -referred for colonoscopy; last 5 years ago       Neuropathy  -  pt has had numbness /tingling in her hands post CVA, for which she is on gabapentin 300 mg BID  - will continue to monitor            FU:   -coordinator to schedule colonoscopy-marrow under sedation already scheduled 12/17-schedule velcade injection on 12/24 am   -cbc, cmp, serum free light chains, quantitative immunoglobulins, serum electropheresis, serum immunofixation and md appt at 4pm on 1/2/19 ; likely do consent signing at this appt

## 2019-12-11 ENCOUNTER — TELEPHONE (OUTPATIENT)
Dept: HEMATOLOGY/ONCOLOGY | Facility: CLINIC | Age: 69
End: 2019-12-11

## 2019-12-11 NOTE — TELEPHONE ENCOUNTER
----- Message from Preeti Johnson sent at 12/11/2019  1:42 PM CST -----  Contact: Emily harper/[facility]  Emily is calling about the medication REVLIMID 25 mg Cap being on hold emily stated pt called to have the medication stopped      Emily contact [phone]

## 2019-12-13 ENCOUNTER — TELEPHONE (OUTPATIENT)
Dept: HEMATOLOGY/ONCOLOGY | Facility: CLINIC | Age: 69
End: 2019-12-13

## 2019-12-13 DIAGNOSIS — C90.00 MULTIPLE MYELOMA NOT HAVING ACHIEVED REMISSION: ICD-10-CM

## 2019-12-13 RX ORDER — ACYCLOVIR 400 MG/1
400 TABLET ORAL 2 TIMES DAILY
Qty: 120 TABLET | Refills: 2 | Status: ON HOLD | OUTPATIENT
Start: 2019-12-13 | End: 2020-02-26 | Stop reason: HOSPADM

## 2019-12-17 ENCOUNTER — OFFICE VISIT (OUTPATIENT)
Dept: HEMATOLOGY/ONCOLOGY | Facility: CLINIC | Age: 69
End: 2019-12-17
Payer: MEDICARE

## 2019-12-17 VITALS
SYSTOLIC BLOOD PRESSURE: 175 MMHG | RESPIRATION RATE: 20 BRPM | TEMPERATURE: 98 F | DIASTOLIC BLOOD PRESSURE: 91 MMHG | OXYGEN SATURATION: 96 % | HEART RATE: 74 BPM

## 2019-12-17 DIAGNOSIS — C90.00 MULTIPLE MYELOMA, REMISSION STATUS UNSPECIFIED: Primary | ICD-10-CM

## 2019-12-17 PROCEDURE — 88342 IMHCHEM/IMCYTCHM 1ST ANTB: CPT | Mod: HCNC | Performed by: PATHOLOGY

## 2019-12-17 PROCEDURE — 38222 PR BONE MARROW BIOPSY(IES) W/ASPIRATION(S); DIAGNOSTIC: ICD-10-PCS | Mod: HCNC,LT,S$GLB, | Performed by: NURSE PRACTITIONER

## 2019-12-17 PROCEDURE — 85097 BONE MARROW INTERPRETATION: CPT | Mod: HCNC,,, | Performed by: PATHOLOGY

## 2019-12-17 PROCEDURE — 88189 PR  FLOWCYTOMETRY/READ, 16 & > MARKERS: ICD-10-PCS | Mod: HCNC,,, | Performed by: PATHOLOGY

## 2019-12-17 PROCEDURE — 88271 CYTOGENETICS DNA PROBE: CPT | Mod: 59,HCNC

## 2019-12-17 PROCEDURE — 99999 PR PBB SHADOW E&M-EST. PATIENT-LVL III: ICD-10-PCS | Mod: PBBFAC,HCNC,, | Performed by: NURSE PRACTITIONER

## 2019-12-17 PROCEDURE — 88364 INSITU HYBRIDIZATION (FISH): CPT | Mod: 26,HCNC,, | Performed by: PATHOLOGY

## 2019-12-17 PROCEDURE — 88305 TISSUE EXAM BY PATHOLOGIST: ICD-10-PCS | Mod: 26,HCNC,, | Performed by: PATHOLOGY

## 2019-12-17 PROCEDURE — 88364 CHG INSITU HYBRIDIZATION (FISH: ICD-10-PCS | Mod: 26,HCNC,, | Performed by: PATHOLOGY

## 2019-12-17 PROCEDURE — 88313 SPECIAL STAINS GROUP 2: CPT | Mod: HCNC | Performed by: PATHOLOGY

## 2019-12-17 PROCEDURE — 99499 UNLISTED E&M SERVICE: CPT | Mod: HCNC,S$GLB,, | Performed by: NURSE PRACTITIONER

## 2019-12-17 PROCEDURE — 88184 FLOWCYTOMETRY/ TC 1 MARKER: CPT | Mod: HCNC | Performed by: PATHOLOGY

## 2019-12-17 PROCEDURE — 88365 INSITU HYBRIDIZATION (FISH): CPT | Mod: HCNC | Performed by: PATHOLOGY

## 2019-12-17 PROCEDURE — 88341 IMHCHEM/IMCYTCHM EA ADD ANTB: CPT | Mod: HCNC | Performed by: PATHOLOGY

## 2019-12-17 PROCEDURE — 99999 PR PBB SHADOW E&M-EST. PATIENT-LVL III: CPT | Mod: PBBFAC,HCNC,, | Performed by: NURSE PRACTITIONER

## 2019-12-17 PROCEDURE — 85097 PR  BONE MARROW,SMEAR INTERPRETATION: ICD-10-PCS | Mod: HCNC,,, | Performed by: PATHOLOGY

## 2019-12-17 PROCEDURE — 88342 IMHCHEM/IMCYTCHM 1ST ANTB: CPT | Mod: 26,59,HCNC, | Performed by: PATHOLOGY

## 2019-12-17 PROCEDURE — 88189 FLOWCYTOMETRY/READ 16 & >: CPT | Mod: HCNC,,, | Performed by: PATHOLOGY

## 2019-12-17 PROCEDURE — 88237 TISSUE CULTURE BONE MARROW: CPT | Mod: HCNC

## 2019-12-17 PROCEDURE — 88305 TISSUE EXAM BY PATHOLOGIST: CPT | Mod: 26,HCNC,, | Performed by: PATHOLOGY

## 2019-12-17 PROCEDURE — 88342 CHG IMMUNOCYTOCHEMISTRY: ICD-10-PCS | Mod: 26,59,HCNC, | Performed by: PATHOLOGY

## 2019-12-17 PROCEDURE — 99499 UNLISTED E&M SERVICE: CPT | Mod: HCNC,BMT,S$GLB, | Performed by: NURSE PRACTITIONER

## 2019-12-17 PROCEDURE — 38222 DX BONE MARROW BX & ASPIR: CPT | Mod: HCNC,LT,S$GLB, | Performed by: NURSE PRACTITIONER

## 2019-12-17 PROCEDURE — 88313 PR  SPECIAL STAINS,GROUP II: ICD-10-PCS | Mod: 26,HCNC,, | Performed by: PATHOLOGY

## 2019-12-17 PROCEDURE — 99499 NO LOS: ICD-10-PCS | Mod: HCNC,S$GLB,, | Performed by: NURSE PRACTITIONER

## 2019-12-17 PROCEDURE — 88365 PR  TISSUE HYBRIDIZATION: ICD-10-PCS | Mod: 26,HCNC,, | Performed by: PATHOLOGY

## 2019-12-17 PROCEDURE — 88264 CHROMOSOME ANALYSIS 20-25: CPT | Mod: HCNC

## 2019-12-17 PROCEDURE — 88313 SPECIAL STAINS GROUP 2: CPT | Mod: 26,HCNC,, | Performed by: PATHOLOGY

## 2019-12-17 PROCEDURE — 88305 TISSUE EXAM BY PATHOLOGIST: CPT | Mod: 59,HCNC | Performed by: PATHOLOGY

## 2019-12-17 PROCEDURE — 88341 IMHCHEM/IMCYTCHM EA ADD ANTB: CPT | Mod: 26,59,HCNC, | Performed by: PATHOLOGY

## 2019-12-17 PROCEDURE — 88185 FLOWCYTOMETRY/TC ADD-ON: CPT | Mod: 59,HCNC | Performed by: PATHOLOGY

## 2019-12-17 PROCEDURE — 88305 TISSUE EXAM BY PATHOLOGIST: CPT | Mod: HCNC

## 2019-12-17 PROCEDURE — 88365 INSITU HYBRIDIZATION (FISH): CPT | Mod: 26,HCNC,, | Performed by: PATHOLOGY

## 2019-12-17 PROCEDURE — 88341 PR IHC OR ICC EACH ADD'L SINGLE ANTIBODY  STAINPR: ICD-10-PCS | Mod: 26,59,HCNC, | Performed by: PATHOLOGY

## 2019-12-17 PROCEDURE — 88364 INSITU HYBRIDIZATION (FISH): CPT | Mod: HCNC | Performed by: PATHOLOGY

## 2019-12-18 NOTE — PROCEDURES
Bone marrow  Date/Time: 12/17/2019 3:30 PM  Performed by: Juli Florse NP  Authorized by: Juli Flores NP     Aspiration?: Yes    Biopsy?: Yes      PROCEDURE NOTE:  Date of Procedure: 12/18/2019  Bone Marrow Biopsy and Aspiration  Indication: MM pre transplant  Consent: Informed consent was obtained from patient.  Timeout: Done and documented.  Position: Prone  Site: Left posterior illiac crest.  Prep: Betadine.  Needle used: 11 gauge Jamshidi needle.  Anesthetic: 2% lidocaine 5 cc.  Biopsy: The biopsy needle was introduced into the marrow cavity and an aspirate was obtained without complications and sent for flow cytometry, PCPD FISH, and cytogenetics. Core biopsy obtained without difficulty and sent for routine histologic examination.  Complications: None.  Disposition: The patient was left in room with RN and instructed to remain on back for 20 minutes prior to d/c home. Instructed to remove bandaid in 24 hours.  Blood loss: Minimal.     Juli Flores DNP, NP  Hematology/Oncology

## 2019-12-19 LAB
BODY SITE - BONE MARROW: NORMAL
CLINICAL DIAGNOSIS - BONE MARROW: NORMAL
FLOW CYTOMETRY ANTIBODIES ANALYZED - BONE MARROW: NORMAL
FLOW CYTOMETRY COMMENT - BONE MARROW: NORMAL
FLOW CYTOMETRY INTERPRETATION - BONE MARROW: NORMAL
GENETICIST REVIEW: NORMAL
PLASMA CELL PROLIF RELEASED BY: NORMAL
PLASMA CELL PROLIF RESULT SUMMARY: NORMAL
PLASMA CELL PROLIF RESULT TABLE: NORMAL
REASON FOR REFERRAL, PLASMA CELL PROLIF (PCPD), FISH: NORMAL
REF LAB TEST METHOD: NORMAL
RESULTS, PLASMA CELL PROLIF (PCPD), FISH: NORMAL
SERVICE CMNT-IMP: NORMAL
SERVICE CMNT-IMP: NORMAL
SPECIMEN SOURCE: NORMAL
SPECIMEN, PLASMA CELL PROLIF (PCPD), FISH: NORMAL

## 2019-12-24 ENCOUNTER — INFUSION (OUTPATIENT)
Dept: INFUSION THERAPY | Facility: HOSPITAL | Age: 69
End: 2019-12-24
Attending: INTERNAL MEDICINE
Payer: MEDICARE

## 2019-12-24 VITALS
HEART RATE: 67 BPM | TEMPERATURE: 98 F | DIASTOLIC BLOOD PRESSURE: 79 MMHG | RESPIRATION RATE: 18 BRPM | SYSTOLIC BLOOD PRESSURE: 149 MMHG

## 2019-12-24 DIAGNOSIS — C90.00 MULTIPLE MYELOMA NOT HAVING ACHIEVED REMISSION: Primary | ICD-10-CM

## 2019-12-24 PROCEDURE — 96401 CHEMO ANTI-NEOPL SQ/IM: CPT | Mod: HCNC

## 2019-12-24 PROCEDURE — 63600175 PHARM REV CODE 636 W HCPCS: Mod: JG,HCNC | Performed by: INTERNAL MEDICINE

## 2019-12-24 RX ORDER — BORTEZOMIB 3.5 MG/1
1.3 INJECTION, POWDER, LYOPHILIZED, FOR SOLUTION INTRAVENOUS; SUBCUTANEOUS
Status: COMPLETED | OUTPATIENT
Start: 2019-12-24 | End: 2019-12-24

## 2019-12-24 RX ORDER — HEPARIN 100 UNIT/ML
500 SYRINGE INTRAVENOUS
Status: CANCELLED | OUTPATIENT
Start: 2019-12-24

## 2019-12-24 RX ORDER — BORTEZOMIB 3.5 MG/1
1.3 INJECTION, POWDER, LYOPHILIZED, FOR SOLUTION INTRAVENOUS; SUBCUTANEOUS
Status: CANCELLED | OUTPATIENT
Start: 2019-12-24

## 2019-12-24 RX ORDER — SODIUM CHLORIDE 0.9 % (FLUSH) 0.9 %
10 SYRINGE (ML) INJECTION
Status: CANCELLED | OUTPATIENT
Start: 2019-12-24

## 2019-12-24 RX ADMIN — BORTEZOMIB 2.4 MG: 3.5 INJECTION, POWDER, LYOPHILIZED, FOR SOLUTION INTRAVENOUS; SUBCUTANEOUS at 09:12

## 2020-01-02 ENCOUNTER — PATIENT OUTREACH (OUTPATIENT)
Dept: ADMINISTRATIVE | Facility: OTHER | Age: 70
End: 2020-01-02

## 2020-01-02 ENCOUNTER — OFFICE VISIT (OUTPATIENT)
Dept: HEMATOLOGY/ONCOLOGY | Facility: CLINIC | Age: 70
End: 2020-01-02
Payer: MEDICARE

## 2020-01-02 ENCOUNTER — LAB VISIT (OUTPATIENT)
Dept: LAB | Facility: HOSPITAL | Age: 70
End: 2020-01-02
Payer: MEDICARE

## 2020-01-02 VITALS
HEART RATE: 70 BPM | OXYGEN SATURATION: 95 % | DIASTOLIC BLOOD PRESSURE: 90 MMHG | WEIGHT: 158.94 LBS | SYSTOLIC BLOOD PRESSURE: 155 MMHG | HEIGHT: 63 IN | RESPIRATION RATE: 16 BRPM | BODY MASS INDEX: 28.16 KG/M2 | TEMPERATURE: 98 F

## 2020-01-02 DIAGNOSIS — C90.01 MULTIPLE MYELOMA IN REMISSION: Primary | ICD-10-CM

## 2020-01-02 DIAGNOSIS — C90.00 MULTIPLE MYELOMA NOT HAVING ACHIEVED REMISSION: ICD-10-CM

## 2020-01-02 DIAGNOSIS — Z76.82 STEM CELL TRANSPLANT CANDIDATE: ICD-10-CM

## 2020-01-02 DIAGNOSIS — D50.0 IRON DEFICIENCY ANEMIA DUE TO CHRONIC BLOOD LOSS: ICD-10-CM

## 2020-01-02 LAB
ALBUMIN SERPL BCP-MCNC: 3.7 G/DL (ref 3.5–5.2)
ALP SERPL-CCNC: 83 U/L (ref 55–135)
ALT SERPL W/O P-5'-P-CCNC: 23 U/L (ref 10–44)
ANION GAP SERPL CALC-SCNC: 10 MMOL/L (ref 8–16)
AST SERPL-CCNC: 21 U/L (ref 10–40)
BASOPHILS # BLD AUTO: 0.05 K/UL (ref 0–0.2)
BASOPHILS NFR BLD: 1 % (ref 0–1.9)
BILIRUB SERPL-MCNC: 0.2 MG/DL (ref 0.1–1)
BUN SERPL-MCNC: 11 MG/DL (ref 8–23)
CALCIUM SERPL-MCNC: 9.9 MG/DL (ref 8.7–10.5)
CHLORIDE SERPL-SCNC: 106 MMOL/L (ref 95–110)
CO2 SERPL-SCNC: 28 MMOL/L (ref 23–29)
COMMENT: NORMAL
CREAT SERPL-MCNC: 0.8 MG/DL (ref 0.5–1.4)
DIFFERENTIAL METHOD: ABNORMAL
EOSINOPHIL # BLD AUTO: 0.2 K/UL (ref 0–0.5)
EOSINOPHIL NFR BLD: 3.6 % (ref 0–8)
ERYTHROCYTE [DISTWIDTH] IN BLOOD BY AUTOMATED COUNT: 23.7 % (ref 11.5–14.5)
EST. GFR  (AFRICAN AMERICAN): >60 ML/MIN/1.73 M^2
EST. GFR  (NON AFRICAN AMERICAN): >60 ML/MIN/1.73 M^2
FERRITIN SERPL-MCNC: 138 NG/ML (ref 20–300)
FINAL PATHOLOGIC DIAGNOSIS: NORMAL
GLUCOSE SERPL-MCNC: 80 MG/DL (ref 70–110)
GROSS: NORMAL
HCT VFR BLD AUTO: 39.8 % (ref 37–48.5)
HGB BLD-MCNC: 11 G/DL (ref 12–16)
IGA SERPL-MCNC: 83 MG/DL (ref 40–350)
IGG SERPL-MCNC: 1060 MG/DL (ref 650–1600)
IGM SERPL-MCNC: 25 MG/DL (ref 50–300)
IMM GRANULOCYTES # BLD AUTO: 0.02 K/UL (ref 0–0.04)
IMM GRANULOCYTES NFR BLD AUTO: 0.4 % (ref 0–0.5)
LYMPHOCYTES # BLD AUTO: 0.8 K/UL (ref 1–4.8)
LYMPHOCYTES NFR BLD: 15.2 % (ref 18–48)
Lab: NORMAL
MCH RBC QN AUTO: 22.3 PG (ref 27–31)
MCHC RBC AUTO-ENTMCNC: 27.6 G/DL (ref 32–36)
MCV RBC AUTO: 81 FL (ref 82–98)
MICROSCOPIC EXAM: NORMAL
MONOCYTES # BLD AUTO: 0.7 K/UL (ref 0.3–1)
MONOCYTES NFR BLD: 13.4 % (ref 4–15)
NEUTROPHILS # BLD AUTO: 3.3 K/UL (ref 1.8–7.7)
NEUTROPHILS NFR BLD: 66.4 % (ref 38–73)
NRBC BLD-RTO: 0 /100 WBC
PLATELET # BLD AUTO: 254 K/UL (ref 150–350)
PMV BLD AUTO: ABNORMAL FL (ref 9.2–12.9)
POTASSIUM SERPL-SCNC: 3.4 MMOL/L (ref 3.5–5.1)
PROT SERPL-MCNC: 7.4 G/DL (ref 6–8.4)
RBC # BLD AUTO: 4.93 M/UL (ref 4–5.4)
SODIUM SERPL-SCNC: 144 MMOL/L (ref 136–145)
SUPPLEMENTAL DIAGNOSIS: NORMAL
WBC # BLD AUTO: 4.99 K/UL (ref 3.9–12.7)

## 2020-01-02 PROCEDURE — 1125F AMNT PAIN NOTED PAIN PRSNT: CPT | Mod: HCNC,S$GLB,, | Performed by: INTERNAL MEDICINE

## 2020-01-02 PROCEDURE — 84165 PATHOLOGIST INTERPRETATION SPE: ICD-10-PCS | Mod: 26,HCNC,, | Performed by: PATHOLOGY

## 2020-01-02 PROCEDURE — 86334 PATHOLOGIST INTERPRETATION IFE: ICD-10-PCS | Mod: 26,HCNC,, | Performed by: PATHOLOGY

## 2020-01-02 PROCEDURE — 80053 COMPREHEN METABOLIC PANEL: CPT | Mod: HCNC

## 2020-01-02 PROCEDURE — 84165 PROTEIN E-PHORESIS SERUM: CPT | Mod: HCNC

## 2020-01-02 PROCEDURE — 99999 PR PBB SHADOW E&M-EST. PATIENT-LVL IV: CPT | Mod: PBBFAC,HCNC,, | Performed by: INTERNAL MEDICINE

## 2020-01-02 PROCEDURE — 3077F SYST BP >= 140 MM HG: CPT | Mod: HCNC,CPTII,S$GLB, | Performed by: INTERNAL MEDICINE

## 2020-01-02 PROCEDURE — 1101F PT FALLS ASSESS-DOCD LE1/YR: CPT | Mod: HCNC,CPTII,S$GLB, | Performed by: INTERNAL MEDICINE

## 2020-01-02 PROCEDURE — 99215 OFFICE O/P EST HI 40 MIN: CPT | Mod: HCNC,S$GLB,, | Performed by: INTERNAL MEDICINE

## 2020-01-02 PROCEDURE — 82784 ASSAY IGA/IGD/IGG/IGM EACH: CPT | Mod: 59,HCNC

## 2020-01-02 PROCEDURE — 3080F DIAST BP >= 90 MM HG: CPT | Mod: HCNC,CPTII,S$GLB, | Performed by: INTERNAL MEDICINE

## 2020-01-02 PROCEDURE — 99215 PR OFFICE/OUTPT VISIT, EST, LEVL V, 40-54 MIN: ICD-10-PCS | Mod: HCNC,S$GLB,, | Performed by: INTERNAL MEDICINE

## 2020-01-02 PROCEDURE — 99999 PR PBB SHADOW E&M-EST. PATIENT-LVL IV: ICD-10-PCS | Mod: PBBFAC,HCNC,, | Performed by: INTERNAL MEDICINE

## 2020-01-02 PROCEDURE — 1159F PR MEDICATION LIST DOCUMENTED IN MEDICAL RECORD: ICD-10-PCS | Mod: HCNC,S$GLB,, | Performed by: INTERNAL MEDICINE

## 2020-01-02 PROCEDURE — 85025 COMPLETE CBC W/AUTO DIFF WBC: CPT | Mod: HCNC

## 2020-01-02 PROCEDURE — 86334 IMMUNOFIX E-PHORESIS SERUM: CPT | Mod: 26,HCNC,, | Performed by: PATHOLOGY

## 2020-01-02 PROCEDURE — 1125F PR PAIN SEVERITY QUANTIFIED, PAIN PRESENT: ICD-10-PCS | Mod: HCNC,S$GLB,, | Performed by: INTERNAL MEDICINE

## 2020-01-02 PROCEDURE — 36415 COLL VENOUS BLD VENIPUNCTURE: CPT | Mod: HCNC

## 2020-01-02 PROCEDURE — 3077F PR MOST RECENT SYSTOLIC BLOOD PRESSURE >= 140 MM HG: ICD-10-PCS | Mod: HCNC,CPTII,S$GLB, | Performed by: INTERNAL MEDICINE

## 2020-01-02 PROCEDURE — 86334 IMMUNOFIX E-PHORESIS SERUM: CPT | Mod: HCNC

## 2020-01-02 PROCEDURE — 82728 ASSAY OF FERRITIN: CPT | Mod: HCNC

## 2020-01-02 PROCEDURE — 1101F PR PT FALLS ASSESS DOC 0-1 FALLS W/OUT INJ PAST YR: ICD-10-PCS | Mod: HCNC,CPTII,S$GLB, | Performed by: INTERNAL MEDICINE

## 2020-01-02 PROCEDURE — 84165 PROTEIN E-PHORESIS SERUM: CPT | Mod: 26,HCNC,, | Performed by: PATHOLOGY

## 2020-01-02 PROCEDURE — 3080F PR MOST RECENT DIASTOLIC BLOOD PRESSURE >= 90 MM HG: ICD-10-PCS | Mod: HCNC,CPTII,S$GLB, | Performed by: INTERNAL MEDICINE

## 2020-01-02 PROCEDURE — 1159F MED LIST DOCD IN RCRD: CPT | Mod: HCNC,S$GLB,, | Performed by: INTERNAL MEDICINE

## 2020-01-02 PROCEDURE — 83520 IMMUNOASSAY QUANT NOS NONAB: CPT | Mod: 59,HCNC

## 2020-01-02 NOTE — PROGRESS NOTES
PATIENT: Shelby Thompson  MRN: [id number]  DATE: 1/6/2020      Diagnosis:   1. Multiple myeloma in remission    2. Stem cell transplant candidate    3. Iron deficiency anemia due to chronic blood loss        Chief Complaint: No chief complaint on file.      Oncologic History:     Multiple Myeloma- presented w CRAB criteria (Hgb 7.1, hypercalcemia, renal function - Cr of 2.7, T7 vertebral fracture)  IgG Kappa, RISS Stage III (beta-2 micro globulin of 17.5 and 14:20 translocation) High Risk disease  She has achieved and tolerated well VRd x completing 7, 28 day cycles and achieving deep VGPR'  last dose velcade 10/31/19.  Extensive PMH as below.    2 prior CVAs (one in 2008, one in 2011)  L breast cancer DCIS stage 0 (s/p lumpectomy and radiation, no endocrine tx due to CVA)  HTN  DM II  Neuropathy, b/l hands  Prior smoker, quit in 2011  Hepatic lesions - vascular per recent MRI in 09 /2019 with no changes; will confirm no further rascon needed from hep  Statin Intolerance - on praluent, PCSK9 inhibitor   HFpEF - EF 55%, diastolic dysfunction  Anxiety/Depression       at our last appt we discussed possible clifton 140mg/m2 transplatn pending adequate pre transplant. She has received cardiac, pulm, and gyne clearance for transplant.  Pending colonoscopy and GI clearance can likely proceed   Feels well .comes to clinic with friend.    Myeloma in CR as of dec 2019 marrow biopsy and biochemical studies.     tolerated iron infusion well.           Past Medical History:   Past Medical History:   Diagnosis Date    Acute respiratory failure with hypoxia 4/30/2019    FUENTES (acute kidney injury) 5/1/2019    Allergy     Anemia     Aortic aneurysm     Breast cancer 10/2011    left breast Stage 0 DCIS    Chronic diastolic congestive heart failure 11/6/2015    Colon polyp     GERD (gastroesophageal reflux disease)     History of colonic polyps     Hx of psychiatric care     Zoloft    HX: breast cancer     Hyperlipemia      Hypertension     ICH (intracerebral hemorrhage)     Major depressive disorder, single episode, mild 6/23/2016    Nuclear sclerosis 7/21/2014    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 7/21/2014    PAD (peripheral artery disease) 11/6/2015    Psychiatric problem     Statin-induced rhabdomyolysis     4/2015     Stroke 4/2011       Past Surgical HIstory:   Past Surgical History:   Procedure Laterality Date    APPENDECTOMY      BONE MARROW BIOPSY Left 10/3/2019    Procedure: Biopsy-bone marrow;  Surgeon: Jere Tineo MD;  Location: [facility] OR [address] [place] [address];  Service: Oncology;  Laterality: Left;    BREAST BIOPSY Left 10/2011    BREAST LUMPECTOMY Left 2011    DCIS    COLONOSCOPY      CYST REMOVAL      back    ESOPHAGOGASTRODUODENOSCOPY  07/2016    duodenal angioectasia    FOOT FRACTURE SURGERY  10/2012    right foot, with R hallux valgus repair    FRACTURE SURGERY Right     broken toe repiar    HEMORRHOID SURGERY      LIVER BIOPSY  04/2015    essentially normal, no fibrosis    TUBAL LIGATION      UPPER GASTROINTESTINAL ENDOSCOPY         Family History:   Family History   Problem Relation Age of Onset    No Known Problems Sister     Cancer Sister 63        Lung Cancer    No Known Problems Mother     Cancer Father     No Known Problems Brother     Hypertension Daughter     Fibroids Daughter         uterine    Hypertension Son     Breast cancer Sister 62    No Known Problems Brother     No Known Problems Maternal Aunt     No Known Problems Maternal Uncle     No Known Problems Paternal Aunt     No Known Problems Paternal Uncle     Hypertension Maternal Grandmother     No Known Problems Maternal Grandfather     No Known Problems Paternal Grandmother     No Known Problems Paternal Grandfather     Ovarian cancer Neg Hx     Colon cancer Neg Hx     Tremor Neg Hx     Amblyopia Neg Hx     Blindness Neg Hx     Cataracts Neg Hx     Diabetes Neg Hx      Glaucoma Neg Hx     Macular degeneration Neg Hx     Retinal detachment Neg Hx     Strabismus Neg Hx     Stroke Neg Hx     Thyroid disease Neg Hx     Esophageal cancer Neg Hx     Rectal cancer Neg Hx     Stomach cancer Neg Hx     Ulcerative colitis Neg Hx     Crohn's disease Neg Hx     Irritable bowel syndrome Neg Hx     Celiac disease Neg Hx        Social History:  reports that she quit smoking about 8 years ago. Her smoking use included cigarettes. She has a 10.00 pack-year smoking history. She quit smokeless tobacco use about 8 years ago. She reports that she drinks alcohol. She reports that she does not use drugs.    Allergies:  Review of patient's allergies indicates:   Allergen Reactions    Lipitor [atorvastatin]      Itching, elevated cpk, muscle aches, statin induced myositis       Medications:  Current Outpatient Medications   Medication Sig Dispense Refill    acetaminophen (TYLENOL) 650 MG TbSR Take 1,300 mg by mouth daily as needed (pain).      acyclovir (ZOVIRAX) 400 MG tablet Take 1 tablet (400 mg total) by mouth 2 (two) times daily. 120 tablet 2    alirocumab (PRALUENT PEN) 75 mg/mL PnIj Inject 1 mL (75 mg total) into the skin every 14 (fourteen) days. Ask your PCP/ Oncologist 2 mL 11    allopurinol (ZYLOPRIM) 300 MG tablet Take 1 tablet (300 mg total) by mouth once daily. (Patient not taking: Reported on 1/6/2020) 30 tablet 1    amLODIPine (NORVASC) 5 MG tablet TAKE 1 TABLET BY MOUTH EVERY DAY 90 tablet 3    aspirin 81 mg Tab Take 1 tablet by mouth Daily.      bisacodyl (DULCOLAX) 5 mg EC tablet Take 5 mg by mouth once daily.      ferrous gluconate 324 mg (37.5 mg iron) Tab TAKE 1 TABLET BY MOUTH 2 (TWO) TIMES DAILY WITH MEALS. 180 tablet 0    gabapentin (NEURONTIN) 300 MG capsule TAKE 1 CAPSULE (300 MG TOTAL) BY MOUTH 2 (TWO) TIMES DAILY. 180 capsule 3    omeprazole (PRILOSEC) 40 MG capsule TAKE 1 CAPSULE BY MOUTH EVERY DAY 90 capsule 3    ondansetron (ZOFRAN) 4 MG tablet Take  "1 tablet (4 mg total) by mouth every 6 (six) hours as needed for Nausea. (Patient not taking: Reported on 1/6/2020) 30 tablet 1    ondansetron (ZOFRAN-ODT) 8 MG TbDL Take 1 tablet (8 mg total) by mouth every 8 (eight) hours as needed (nausea/vomiting). (Patient not taking: Reported on 1/6/2020) 60 tablet 1    REVLIMID 25 mg Cap TAKE 1 CAPSULE BY MOUTH EVERY DAY (Patient not taking: Reported on 1/6/2020) 21 each 0    sertraline (ZOLOFT) 50 MG tablet TAKE 1 TABLET (50 MG TOTAL) BY MOUTH ONCE DAILY. (Patient not taking: Reported on 1/6/2020) 90 tablet 3    traMADol (ULTRAM) 50 mg tablet Take 1 tablet (50 mg total) by mouth 2 (two) times daily as needed for Pain. 60 tablet 3    vitamin D 1000 units Tab Take 185 mg by mouth once daily.       Current Facility-Administered Medications   Medication Dose Route Frequency Provider Last Rate Last Dose    zoledronic acid (ZOMETA) 4 mg in sodium chloride 0.9% 100 mL IVPB  4 mg Intravenous 1 time in Clinic/HOD Genny Napoles MD           Review of Systems   Constitutional: Negative for fatigue and unexpected weight change.   Respiratory: Negative for cough.    Cardiovascular: Negative for chest pain and palpitations.   Gastrointestinal: Negative for abdominal pain.   Genitourinary: Negative for difficulty urinating and dysuria.   Musculoskeletal: Positive for back pain and gait problem.   Skin: Negative for rash.   Neurological: Positive for tremors, weakness and numbness. Negative for headaches.   Hematological: Negative for adenopathy.       ECOG Performance Status: 1   Objective:      Vitals:   Vitals:    01/02/20 1516   BP: (!) 155/90   Pulse: 70   Resp: 16   Temp: 98.2 °F (36.8 °C)   TempSrc: Oral   SpO2: 95%   Weight: 72.1 kg (158 lb 15.2 oz)   Height: 5' 3" (1.6 m)             Physical Exam   Constitutional: She is oriented to person, place, and time. She appears well-developed and well-nourished. No distress.   HENT:   Head: Normocephalic and atraumatic. "   Mouth/Throat: Oropharynx is clear and moist.   Eyes: Pupils are equal, round, and reactive to light. Conjunctivae and EOM are normal.   Neck: Normal range of motion. Neck supple.   Cardiovascular: Normal rate, regular rhythm and normal heart sounds. Exam reveals no gallop and no friction rub.   No murmur heard.  Pulmonary/Chest: Effort normal and breath sounds normal. No respiratory distress. She has no wheezes. She has no rales.   Abdominal: Soft. Bowel sounds are normal. She exhibits no distension. There is no tenderness. There is no guarding.   Musculoskeletal: Normal range of motion. She exhibits no edema.   Lymphadenopathy:     She has no cervical adenopathy.   Neurological: She is alert and oriented to person, place, and time. No cranial nerve deficit.   Intentional R hand tremor; RUE - 4/5 strength and RLE extremity also 4/5; full strengths on LUE and LLE   Skin: Skin is warm and dry. No rash noted.       Laboratory Data:  Lab Results   Component Value Date    WBC 2.84 (L) 10/15/2019    HGB 9.7 (L) 10/15/2019    HCT 34.5 (L) 10/15/2019    MCV 76 (L) 10/15/2019     10/15/2019       Gran # (ANC)   Date Value Ref Range Status   01/02/2020 3.3 1.8 - 7.7 K/uL Final     Gran%   Date Value Ref Range Status   01/02/2020 66.4 38.0 - 73.0 % Final     CMP  Sodium   Date Value Ref Range Status   01/02/2020 144 136 - 145 mmol/L Final     Potassium   Date Value Ref Range Status   01/02/2020 3.4 (L) 3.5 - 5.1 mmol/L Final     Chloride   Date Value Ref Range Status   01/02/2020 106 95 - 110 mmol/L Final     CO2   Date Value Ref Range Status   01/02/2020 28 23 - 29 mmol/L Final     Glucose   Date Value Ref Range Status   01/02/2020 80 70 - 110 mg/dL Final     BUN, Bld   Date Value Ref Range Status   01/02/2020 11 8 - 23 mg/dL Final     Creatinine   Date Value Ref Range Status   01/02/2020 0.8 0.5 - 1.4 mg/dL Final     Calcium   Date Value Ref Range Status   01/02/2020 9.9 8.7 - 10.5 mg/dL Final     Total Protein    Date Value Ref Range Status   01/02/2020 7.4 6.0 - 8.4 g/dL Final     Albumin   Date Value Ref Range Status   01/02/2020 3.7 3.5 - 5.2 g/dL Final     Total Bilirubin   Date Value Ref Range Status   01/02/2020 0.2 0.1 - 1.0 mg/dL Final     Comment:     For infants and newborns, interpretation of results should be based  on gestational age, weight and in agreement with clinical  observations.  Premature Infant recommended reference ranges:  Up to 24 hours.............<8.0 mg/dL  Up to 48 hours............<12.0 mg/dL  3-5 days..................<15.0 mg/dL  6-29 days.................<15.0 mg/dL       Alkaline Phosphatase   Date Value Ref Range Status   01/02/2020 83 55 - 135 U/L Final     AST   Date Value Ref Range Status   01/02/2020 21 10 - 40 U/L Final     ALT   Date Value Ref Range Status   01/02/2020 23 10 - 44 U/L Final     Anion Gap   Date Value Ref Range Status   01/02/2020 10 8 - 16 mmol/L Final     eGFR if    Date Value Ref Range Status   01/02/2020 >60.0 >60 mL/min/1.73 m^2 Final     eGFR if non    Date Value Ref Range Status   01/02/2020 >60.0 >60 mL/min/1.73 m^2 Final     Comment:     Calculation used to obtain the estimated glomerular filtration  rate (eGFR) is the CKD-EPI equation.        [place] Free Light Chains   Date Value Ref Range Status   01/02/2020 1.43 0.33 - 1.94 mg/dL Final   11/25/2019 1.71 0.33 - 1.94 mg/dL Final   10/15/2019 1.76 0.33 - 1.94 mg/dL Final     Lambda Free Light Chains   Date Value Ref Range Status   01/02/2020 1.00 0.57 - 2.63 mg/dL Final   11/25/2019 1.08 0.57 - 2.63 mg/dL Final   10/15/2019 0.93 0.57 - 2.63 mg/dL Final     Kappa/Lambda FLC Ratio   Date Value Ref Range Status   01/02/2020 1.43 0.26 - 1.65 Final   11/25/2019 1.58 0.26 - 1.65 Final   10/15/2019 1.89 (H) 0.26 - 1.65 Final     IgG - Serum   Date Value Ref Range Status   01/02/2020 1060 650 - 1600 mg/dL Final     Comment:     IgG Cord Blood Reference Range: 650-1600 mg/dL.     IgA    Date Value Ref Range Status   01/02/2020 83 40 - 350 mg/dL Final     Comment:     IgA Cord Blood Reference Range: <5 mg/dL.     IgM   Date Value Ref Range Status   01/02/2020 25 (L) 50 - 300 mg/dL Final     Comment:     IgM Cord Blood Reference Range: <25 mg/dL.         Assessment:       1. Multiple myeloma in remission    2. Stem cell transplant candidate    3. Iron deficiency anemia due to chronic blood loss           Plan:        Multiple Myeloma  - 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) High Risk disease; complex karyotype by CG; , t(14;20), monosomy 13 on FISH  -She has achieved and tolerated well VRd x completing 7, 28 day cycles and achieving  Stringent  CR as of dec 2019 marrow/biochemical studies   -adequate response to proceed with autoSCT  -she has PS 1-2 and multiple abnormalities in her pre transplant eval that have been cleared by pulmonology, cardiology, gynecology and are not prohibitive to proceed with transplant  -she has JESSE - sp  one dose of iv feraheme with response  will need reassessment by GI; if colonosocpy normal  proceed with transplant   -no further velcade maintenance pre transplant, last dose was 12/24/19  -spent 60 minutes on this case, half of which was spent face to face with patient discussing rationale, alternatives, risks of central line placement,  Mobilization/colleection, melphalan autoSCT; discussed estimated 1% risk of transplant related mortality  -discussed need for appt and medication compliance following transplant  -discussed need for full time caretaker through day +30  -discussed need to stay within 60 miles of transplant center through day +30  -patient and caretaker expressed understanding; all written consents obtained  -plan for reduced dose clifton 140mg/m2 in light of age     -supportive meds: continue acyc, asa zometa     Lower extremity edema, worse on L leg  - DVT has been ruled out, better during this  visit  -continue dexmethasone to 20 mg  - continue compression stockings     Nausea/ vomiting  - controlled on zofran at first sign of nausea  - will add secondary medications for additional control too if this becomes worse or uncontrolled      Malnutrition/loss of appetite  - discussed with SW about arranging pt with boost/ensure      Anemia  - JESSE  -proceed with single dose feraheme today   -referred for colonoscopy; last 5 years ago       Neuropathy  -  pt has had numbness /tingling in her hands post CVA, for which she is on gabapentin 300 mg BID; increase to 600mg qhs  - will continue to monitor            FU:   -per BMT coordinator

## 2020-01-03 LAB
ALBUMIN SERPL ELPH-MCNC: 3.82 G/DL (ref 3.35–5.55)
ALPHA1 GLOB SERPL ELPH-MCNC: 0.5 G/DL (ref 0.17–0.41)
ALPHA2 GLOB SERPL ELPH-MCNC: 0.91 G/DL (ref 0.43–0.99)
B-GLOBULIN SERPL ELPH-MCNC: 0.82 G/DL (ref 0.5–1.1)
GAMMA GLOB SERPL ELPH-MCNC: 0.86 G/DL (ref 0.67–1.58)
INTERPRETATION SERPL IFE-IMP: NORMAL
KAPPA LC SER QL IA: 1.43 MG/DL (ref 0.33–1.94)
KAPPA LC/LAMBDA SER IA: 1.43 (ref 0.26–1.65)
LAMBDA LC SER QL IA: 1 MG/DL (ref 0.57–2.63)
PATHOLOGIST INTERPRETATION IFE: NORMAL
PATHOLOGIST INTERPRETATION SPE: NORMAL
PROT SERPL-MCNC: 6.9 G/DL (ref 6–8.4)

## 2020-01-03 RX ORDER — POTASSIUM CHLORIDE 750 MG/1
40 TABLET, EXTENDED RELEASE ORAL ONCE AS NEEDED
Status: CANCELLED | OUTPATIENT
Start: 2020-02-01

## 2020-01-03 RX ORDER — LANOLIN ALCOHOL/MO/W.PET/CERES
800 CREAM (GRAM) TOPICAL ONCE AS NEEDED
Status: CANCELLED | OUTPATIENT
Start: 2020-02-04

## 2020-01-03 RX ORDER — ACETAMINOPHEN 325 MG/1
650 TABLET ORAL ONCE AS NEEDED
Status: CANCELLED | OUTPATIENT
Start: 2020-01-31

## 2020-01-03 RX ORDER — SODIUM,POTASSIUM PHOSPHATES 280-250MG
2 POWDER IN PACKET (EA) ORAL ONCE AS NEEDED
Status: CANCELLED | OUTPATIENT
Start: 2020-02-05

## 2020-01-03 RX ORDER — LANOLIN ALCOHOL/MO/W.PET/CERES
800 CREAM (GRAM) TOPICAL ONCE AS NEEDED
Status: CANCELLED | OUTPATIENT
Start: 2020-02-02

## 2020-01-03 RX ORDER — DIPHENHYDRAMINE HCL 25 MG
25 CAPSULE ORAL ONCE AS NEEDED
Status: CANCELLED | OUTPATIENT
Start: 2020-02-01

## 2020-01-03 RX ORDER — PLERIXAFOR 24 MG/1.2ML
0.24 SOLUTION SUBCUTANEOUS ONCE AS NEEDED
Status: CANCELLED | OUTPATIENT
Start: 2020-02-03

## 2020-01-03 RX ORDER — POTASSIUM CHLORIDE 750 MG/1
40 TABLET, EXTENDED RELEASE ORAL ONCE AS NEEDED
Status: CANCELLED | OUTPATIENT
Start: 2020-02-03

## 2020-01-03 RX ORDER — SODIUM,POTASSIUM PHOSPHATES 280-250MG
2 POWDER IN PACKET (EA) ORAL ONCE AS NEEDED
Status: CANCELLED | OUTPATIENT
Start: 2020-02-02

## 2020-01-03 RX ORDER — ACETAMINOPHEN 325 MG/1
650 TABLET ORAL ONCE AS NEEDED
Status: CANCELLED | OUTPATIENT
Start: 2020-02-03

## 2020-01-03 RX ORDER — ACETAMINOPHEN 325 MG/1
650 TABLET ORAL ONCE AS NEEDED
Status: CANCELLED | OUTPATIENT
Start: 2020-02-04

## 2020-01-03 RX ORDER — POTASSIUM CHLORIDE 750 MG/1
40 TABLET, EXTENDED RELEASE ORAL ONCE AS NEEDED
Status: CANCELLED | OUTPATIENT
Start: 2020-02-05

## 2020-01-03 RX ORDER — DIPHENHYDRAMINE HCL 25 MG
25 CAPSULE ORAL ONCE AS NEEDED
Status: CANCELLED | OUTPATIENT
Start: 2020-02-05

## 2020-01-03 RX ORDER — LANOLIN ALCOHOL/MO/W.PET/CERES
800 CREAM (GRAM) TOPICAL ONCE AS NEEDED
Status: CANCELLED | OUTPATIENT
Start: 2020-01-31

## 2020-01-03 RX ORDER — ACETAMINOPHEN 325 MG/1
650 TABLET ORAL ONCE AS NEEDED
Status: CANCELLED | OUTPATIENT
Start: 2020-01-16

## 2020-01-03 RX ORDER — SODIUM,POTASSIUM PHOSPHATES 280-250MG
2 POWDER IN PACKET (EA) ORAL ONCE AS NEEDED
Status: CANCELLED | OUTPATIENT
Start: 2020-01-31

## 2020-01-03 RX ORDER — SODIUM,POTASSIUM PHOSPHATES 280-250MG
2 POWDER IN PACKET (EA) ORAL ONCE AS NEEDED
Status: CANCELLED | OUTPATIENT
Start: 2020-01-16

## 2020-01-03 RX ORDER — LANOLIN ALCOHOL/MO/W.PET/CERES
800 CREAM (GRAM) TOPICAL ONCE AS NEEDED
Status: CANCELLED | OUTPATIENT
Start: 2020-02-05

## 2020-01-03 RX ORDER — DIPHENHYDRAMINE HCL 25 MG
25 CAPSULE ORAL ONCE AS NEEDED
Status: CANCELLED | OUTPATIENT
Start: 2020-02-04

## 2020-01-03 RX ORDER — DIPHENHYDRAMINE HCL 25 MG
25 CAPSULE ORAL ONCE AS NEEDED
Status: CANCELLED | OUTPATIENT
Start: 2020-01-31

## 2020-01-03 RX ORDER — POTASSIUM CHLORIDE 750 MG/1
40 TABLET, EXTENDED RELEASE ORAL ONCE AS NEEDED
Status: CANCELLED | OUTPATIENT
Start: 2020-02-04

## 2020-01-03 RX ORDER — DIPHENHYDRAMINE HCL 25 MG
25 CAPSULE ORAL ONCE AS NEEDED
Status: CANCELLED | OUTPATIENT
Start: 2020-02-03

## 2020-01-03 RX ORDER — DIPHENHYDRAMINE HCL 25 MG
25 CAPSULE ORAL ONCE AS NEEDED
Status: CANCELLED | OUTPATIENT
Start: 2020-01-16

## 2020-01-03 RX ORDER — LANOLIN ALCOHOL/MO/W.PET/CERES
800 CREAM (GRAM) TOPICAL ONCE AS NEEDED
Status: CANCELLED | OUTPATIENT
Start: 2020-02-01

## 2020-01-03 RX ORDER — ACETAMINOPHEN 325 MG/1
650 TABLET ORAL ONCE AS NEEDED
Status: CANCELLED | OUTPATIENT
Start: 2020-02-01

## 2020-01-03 RX ORDER — POTASSIUM CHLORIDE 750 MG/1
40 TABLET, EXTENDED RELEASE ORAL ONCE AS NEEDED
Status: CANCELLED | OUTPATIENT
Start: 2020-01-16

## 2020-01-03 RX ORDER — SODIUM,POTASSIUM PHOSPHATES 280-250MG
2 POWDER IN PACKET (EA) ORAL ONCE AS NEEDED
Status: CANCELLED | OUTPATIENT
Start: 2020-02-03

## 2020-01-03 RX ORDER — DIPHENHYDRAMINE HCL 25 MG
25 CAPSULE ORAL ONCE AS NEEDED
Status: CANCELLED | OUTPATIENT
Start: 2020-02-02

## 2020-01-03 RX ORDER — SODIUM,POTASSIUM PHOSPHATES 280-250MG
2 POWDER IN PACKET (EA) ORAL ONCE AS NEEDED
Status: CANCELLED | OUTPATIENT
Start: 2020-02-01

## 2020-01-03 RX ORDER — LANOLIN ALCOHOL/MO/W.PET/CERES
800 CREAM (GRAM) TOPICAL ONCE AS NEEDED
Status: CANCELLED | OUTPATIENT
Start: 2020-01-16

## 2020-01-03 RX ORDER — ACETAMINOPHEN 325 MG/1
650 TABLET ORAL ONCE AS NEEDED
Status: CANCELLED | OUTPATIENT
Start: 2020-02-05

## 2020-01-03 RX ORDER — SODIUM,POTASSIUM PHOSPHATES 280-250MG
2 POWDER IN PACKET (EA) ORAL ONCE AS NEEDED
Status: CANCELLED | OUTPATIENT
Start: 2020-02-04

## 2020-01-03 RX ORDER — POTASSIUM CHLORIDE 750 MG/1
40 TABLET, EXTENDED RELEASE ORAL ONCE AS NEEDED
Status: CANCELLED | OUTPATIENT
Start: 2020-02-02

## 2020-01-03 RX ORDER — POTASSIUM CHLORIDE 750 MG/1
40 TABLET, EXTENDED RELEASE ORAL ONCE AS NEEDED
Status: CANCELLED | OUTPATIENT
Start: 2020-01-31

## 2020-01-03 RX ORDER — ACETAMINOPHEN 325 MG/1
650 TABLET ORAL ONCE AS NEEDED
Status: CANCELLED | OUTPATIENT
Start: 2020-02-02

## 2020-01-03 RX ORDER — LANOLIN ALCOHOL/MO/W.PET/CERES
800 CREAM (GRAM) TOPICAL ONCE AS NEEDED
Status: CANCELLED | OUTPATIENT
Start: 2020-02-03

## 2020-01-03 RX ORDER — PLERIXAFOR 24 MG/1.2ML
0.24 SOLUTION SUBCUTANEOUS ONCE AS NEEDED
Status: CANCELLED | OUTPATIENT
Start: 2020-02-04

## 2020-01-06 ENCOUNTER — TELEPHONE (OUTPATIENT)
Dept: ENDOSCOPY | Facility: HOSPITAL | Age: 70
End: 2020-01-06

## 2020-01-06 ENCOUNTER — OFFICE VISIT (OUTPATIENT)
Dept: GASTROENTEROLOGY | Facility: CLINIC | Age: 70
End: 2020-01-06
Payer: MEDICARE

## 2020-01-06 VITALS
HEART RATE: 79 BPM | DIASTOLIC BLOOD PRESSURE: 87 MMHG | BODY MASS INDEX: 28.21 KG/M2 | WEIGHT: 159.19 LBS | SYSTOLIC BLOOD PRESSURE: 147 MMHG | HEIGHT: 63 IN

## 2020-01-06 DIAGNOSIS — Z12.11 SPECIAL SCREENING FOR MALIGNANT NEOPLASMS, COLON: Primary | ICD-10-CM

## 2020-01-06 DIAGNOSIS — Z12.11 ENCOUNTER FOR SCREENING COLONOSCOPY: Primary | ICD-10-CM

## 2020-01-06 DIAGNOSIS — R14.2 BELCHING: ICD-10-CM

## 2020-01-06 DIAGNOSIS — K22.2 SCHATZKI'S RING: ICD-10-CM

## 2020-01-06 PROCEDURE — 1159F PR MEDICATION LIST DOCUMENTED IN MEDICAL RECORD: ICD-10-PCS | Mod: HCNC,S$GLB,, | Performed by: NURSE PRACTITIONER

## 2020-01-06 PROCEDURE — 3077F SYST BP >= 140 MM HG: CPT | Mod: HCNC,CPTII,S$GLB, | Performed by: NURSE PRACTITIONER

## 2020-01-06 PROCEDURE — 1101F PT FALLS ASSESS-DOCD LE1/YR: CPT | Mod: HCNC,CPTII,S$GLB, | Performed by: NURSE PRACTITIONER

## 2020-01-06 PROCEDURE — 99204 PR OFFICE/OUTPT VISIT, NEW, LEVL IV, 45-59 MIN: ICD-10-PCS | Mod: HCNC,S$GLB,, | Performed by: NURSE PRACTITIONER

## 2020-01-06 PROCEDURE — 3077F PR MOST RECENT SYSTOLIC BLOOD PRESSURE >= 140 MM HG: ICD-10-PCS | Mod: HCNC,CPTII,S$GLB, | Performed by: NURSE PRACTITIONER

## 2020-01-06 PROCEDURE — 1101F PR PT FALLS ASSESS DOC 0-1 FALLS W/OUT INJ PAST YR: ICD-10-PCS | Mod: HCNC,CPTII,S$GLB, | Performed by: NURSE PRACTITIONER

## 2020-01-06 PROCEDURE — 1159F MED LIST DOCD IN RCRD: CPT | Mod: HCNC,S$GLB,, | Performed by: NURSE PRACTITIONER

## 2020-01-06 PROCEDURE — 3079F PR MOST RECENT DIASTOLIC BLOOD PRESSURE 80-89 MM HG: ICD-10-PCS | Mod: HCNC,CPTII,S$GLB, | Performed by: NURSE PRACTITIONER

## 2020-01-06 PROCEDURE — 99999 PR PBB SHADOW E&M-EST. PATIENT-LVL IV: ICD-10-PCS | Mod: PBBFAC,HCNC,, | Performed by: NURSE PRACTITIONER

## 2020-01-06 PROCEDURE — 3079F DIAST BP 80-89 MM HG: CPT | Mod: HCNC,CPTII,S$GLB, | Performed by: NURSE PRACTITIONER

## 2020-01-06 PROCEDURE — 1126F AMNT PAIN NOTED NONE PRSNT: CPT | Mod: HCNC,S$GLB,, | Performed by: NURSE PRACTITIONER

## 2020-01-06 PROCEDURE — 1126F PR PAIN SEVERITY QUANTIFIED, NO PAIN PRESENT: ICD-10-PCS | Mod: HCNC,S$GLB,, | Performed by: NURSE PRACTITIONER

## 2020-01-06 PROCEDURE — 99204 OFFICE O/P NEW MOD 45 MIN: CPT | Mod: HCNC,S$GLB,, | Performed by: NURSE PRACTITIONER

## 2020-01-06 PROCEDURE — 99999 PR PBB SHADOW E&M-EST. PATIENT-LVL IV: CPT | Mod: PBBFAC,HCNC,, | Performed by: NURSE PRACTITIONER

## 2020-01-06 RX ORDER — POLYETHYLENE GLYCOL 3350, SODIUM SULFATE ANHYDROUS, SODIUM BICARBONATE, SODIUM CHLORIDE, POTASSIUM CHLORIDE 236; 22.74; 6.74; 5.86; 2.97 G/4L; G/4L; G/4L; G/4L; G/4L
4 POWDER, FOR SOLUTION ORAL ONCE
Qty: 4000 ML | Refills: 0 | Status: SHIPPED | OUTPATIENT
Start: 2020-01-06 | End: 2020-01-06

## 2020-01-06 NOTE — LETTER
January 7, 2020      Emanuel Arevalo Jr., MD  [address]  [place] [address]           [facility] - Gastroenterology  [address]  [facility] [address]  Phone: [phone]  Fax: [phone]          Patient: Shelby Thompson   MR Number: [id number]   YOB: 1950   Date of Visit: 1/6/2020       Dear Dr. Emanuel Arevalo Jr.:    Thank you for referring Shelby Thompson to me for evaluation. Attached you will find relevant portions of my assessment and plan of care.    If you have questions, please do not hesitate to call me. I look forward to following Shelby Thompson along with you.    Sincerely,    Nicolasa Mao, NP    Enclosure  CC:  No Recipients    If you would like to receive this communication electronically, please contact [email or web address] or [phone] to request more information on [employer] Link access.    For providers and/or their staff who would like to refer a patient to [facility], please contact us through our one-stop-shop provider referral line, [facility], at [phone].    If you feel you have received this communication in error or would no longer like to receive these types of communications, please e-mail [email or web address]

## 2020-01-06 NOTE — PROGRESS NOTES
[facility] Consultation Note    Reason for Consult:  The primary encounter diagnosis was Encounter for screening colonoscopy. Diagnoses of Belching and Schatzki's ring were also pertinent to this visit.    PCP:   Emanuel Arevalo   [address] / [place] [address]    Referring MD:  Emanuel Arevalo Jr., Md  [address] [place] [address]    Initial History of Present Illness (HPI):  This is a 69 y.o. female here for evaluation of stem cell transplant work-up. Per Dr. Tineo she requires a screening colonoscopy for final clearance. She is a new pt to this practice as she was last in 2016 for [facility]. She also complains of intermittent belching throughout the day. She takes 40mg Prilosec daily before meals.  She denies rectal bleeding, melena, unintentional weight loss, nausea, vomiting, or abdominal pain. She had initially lost weight d/t decrease in appetite when starting chemo but has since regained her appetite and weight.     Abdominal pain - no  Reflux - no  Dysphagia - no   Bowel habits - daily soft formed bowel movement  GI bleeding - none  NSAID usage - none      ROS:  Constitutional: No fevers, chills, No weight loss  ENT:  No heartburn no dysphagia no odynophagia no hoarseness  CV: No chest pain, no palpitation  Pulm: No cough, No shortness of breath, no wheezing  Ophtho: No vision changes  GI: see HPI  Derm: No rash, no itching  Heme: No lymphadenopathy, No easy bruising  MSK: No significant arthritis  : No dysuria, No hematuria  Endo: No hot or cold intolerance  Neuro: No syncope, No seizure, no strokes  Psych: No uncontrolled anxiety, No uncontrolled depression    Medical History:  has a past medical history of Acute respiratory failure with hypoxia (4/30/2019), FUENTES (acute kidney injury) (5/1/2019), Allergy, Anemia, Aortic aneurysm, Breast cancer (10/2011), Chronic diastolic congestive heart failure (11/6/2015), Colon polyp, GERD (gastroesophageal reflux disease),  History of colonic polyps, psychiatric care, breast cancer, Hyperlipemia, Hypertension, ICH (intracerebral hemorrhage), Major depressive disorder, single episode, mild (6/23/2016), Nuclear sclerosis (7/21/2014), 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 (7/21/2014), PAD (peripheral artery disease) (11/6/2015), Psychiatric problem, Statin-induced rhabdomyolysis, and Stroke (4/2011).    Surgical History:  has a past surgical history that includes Appendectomy; Tubal ligation; Hemorrhoid surgery; Foot fracture surgery (10/2012); Colonoscopy; Upper gastrointestinal endoscopy; Liver biopsy (04/2015); Fracture surgery (Right); Cyst Removal; Esophagogastroduodenoscopy (07/2016); Breast biopsy (Left, 10/2011); Breast lumpectomy (Left, 2011); and Bone marrow biopsy (Left, 10/3/2019).    Family History: family history includes Breast cancer (age of onset: 62) in her sister; Cancer in her father; Cancer (age of onset: 63) in her sister; Fibroids in her daughter; Hypertension in her daughter, maternal grandmother, and son; No Known Problems in her brother, brother, maternal aunt, maternal grandfather, maternal uncle, mother, paternal aunt, paternal grandfather, paternal grandmother, paternal uncle, and sister..     Social History:  reports that she quit smoking about 8 years ago. Her smoking use included cigarettes. She has a 10.00 pack-year smoking history. She quit smokeless tobacco use about 8 years ago. She reports that she drinks alcohol. She reports that she does not use drugs.    Review of patient's allergies indicates:   Allergen Reactions    Lipitor [atorvastatin]      Itching, elevated cpk, muscle aches, statin induced myositis       Medication List with Changes/Refills   Current Medications    ACETAMINOPHEN (TYLENOL) 650 MG TBSR    Take 1,300 mg by mouth daily as needed (pain).    ACYCLOVIR (ZOVIRAX) 400 MG TABLET    Take 1 tablet (400 mg total) by mouth 2 (two) times daily.     "ALIROCUMAB (PRALUENT PEN) 75 MG/ML PNIJ    Inject 1 mL (75 mg total) into the skin every 14 (fourteen) days. Ask your PCP/ Oncologist    ALLOPURINOL (ZYLOPRIM) 300 MG TABLET    Take 1 tablet (300 mg total) by mouth once daily.    AMLODIPINE (NORVASC) 5 MG TABLET    TAKE 1 TABLET BY MOUTH EVERY DAY    ASPIRIN 81 MG TAB    Take 1 tablet by mouth Daily.    BISACODYL (DULCOLAX) 5 MG EC TABLET    Take 5 mg by mouth once daily.    FERROUS GLUCONATE 324 MG (37.5 MG IRON) TAB    TAKE 1 TABLET BY MOUTH 2 (TWO) TIMES DAILY WITH MEALS.    GABAPENTIN (NEURONTIN) 300 MG CAPSULE    TAKE 1 CAPSULE (300 MG TOTAL) BY MOUTH 2 (TWO) TIMES DAILY.    OMEPRAZOLE (PRILOSEC) 40 MG CAPSULE    TAKE 1 CAPSULE BY MOUTH EVERY DAY    ONDANSETRON (ZOFRAN) 4 MG TABLET    Take 1 tablet (4 mg total) by mouth every 6 (six) hours as needed for Nausea.    ONDANSETRON (ZOFRAN-ODT) 8 MG TBDL    Take 1 tablet (8 mg total) by mouth every 8 (eight) hours as needed (nausea/vomiting).    REVLIMID 25 MG CAP    TAKE 1 CAPSULE BY MOUTH EVERY DAY    SERTRALINE (ZOLOFT) 50 MG TABLET    TAKE 1 TABLET (50 MG TOTAL) BY MOUTH ONCE DAILY.    TRAMADOL (ULTRAM) 50 MG TABLET    Take 1 tablet (50 mg total) by mouth 2 (two) times daily as needed for Pain.    VITAMIN D 1000 UNITS TAB    Take 185 mg by mouth once daily.         Objective Findings:    Vital Signs:  Blood Pressure (Abnormal) 147/87 (BP Location: Left arm)   Pulse 79   Height 5' 3" (1.6 m)   Weight 72.2 kg (159 lb 2.8 oz)   Last Menstrual Period  (LMP Unknown)   Body Mass Index 28.20 kg/m²   Body mass index is 28.2 kg/m².    Physical Exam:  General Appearance: Well appearing in no acute distress  Eyes:    No scleral icterus  ENT:  No lesions or masses   Lungs: CTA bilaterally, no wheezes, no rhonchi, no rales  Heart:  S1, S2 normal, no murmurs heard  Abdomen:  Non distended, soft, no guarding, no rebound, no tenderness, no appreciated ascites, no bruits, no hepatosplenomegaly,  No CVA tenderness, no " appreciated hernias  Musculoskeletal:  No major joint deformities  Skin: No petechiae or rash on exposed skin areas  Neurologic:  Alert and oriented x4  Psychiatric:  Normal speech mentation and affect    Labs:  Lab Results   Component Value Date    WBC 4.99 2020    HGB 11.0 (L) 2020    HCT 39.8 2020     2020    CHOL 150 2018    TRIG 137 2018    HDL 43 2018    ALT 23 2020    AST 21 2020     2020    K 3.4 (L) 2020     2020    CREATININE 0.8 2020    BUN 11 2020    CO2 28 2020    TSH 3.268 2019    INR 0.9 10/15/2019    HGBA1C 7.4 (H) 2019           Imagin19 Whole body PET CT  -Significant decrease in radiotracer uptake throughout the axial and appendicular skeleton in this patient with multiple myeloma, suggesting a good response to treatment.  -Endometrium appears thickened and fluid filled, raising the question for cervical stenosis possibly secondary to chronic inflammation/fibrosis with neoplasm not excluded.    -Recommend correlation with OB/Gyn history and consultation.    Endoscopy:    16 VCE             -Gastritis.                        - Multiple angioectasias without bleeding in the                         proximal small bowel.                        - A single angioectasia without bleeding in the                         ileum.    16 EGD            - Mild non obstructing Schatzki ring.                        - Medium-sized hiatus hernia.                        - A single non-bleeding angioectasia in the                         duodenum, treated with bipolar cautery.    3/26/15 Colonoscopy            - One 5 mm polyp at the appendiceal orifice.                         Resected and retrieved.                        - The examination was otherwise normal.             - 1 hyperplastic polyp    Medical Decision Making:        Assessment:  1. Encounter for screening  colonoscopy    2. Belching    3. Schatzki's ring         Recommendations:   Ms. Thompson is a 69yr female who presents for screening colonoscopy prior to stem cell transplant. She has no family history of CRC and colonoscopy in 2015 with one hyperplastic polyp. She has been on daily 40mg Prilosec for years, but continues to have excessive belching. It is reasonable to repeat EGD while undergoing anesthesia for screening colonoscopy since it has not been in done in many years.     Follow up if symptoms worsen or fail to improve.      Order summary:  Orders Placed This Encounter    Case request GI: EGD (ESOPHAGOGASTRODUODENOSCOPY), COLONOSCOPY         Thank you for allowing me to participate in the care of Shelby Thompson    RANDY Gordon

## 2020-01-07 ENCOUNTER — TELEPHONE (OUTPATIENT)
Dept: ENDOSCOPY | Facility: HOSPITAL | Age: 70
End: 2020-01-07

## 2020-01-07 NOTE — TELEPHONE ENCOUNTER
----- Message from Amarilis Wilson MA sent at 1/7/2020  2:49 PM CST -----  Regarding: FW: Praluent Prescription      ----- Message -----  From: Maile Quintero  Sent: 1/7/2020  12:53 PM CST  To: Luz Elena Stratton MD, Chayito Laguna Staff  Subject: Praluent Prescription                            Hello Dr. Stratton and Staff,   Patient has requested our assistance in reapplying for  assistance for 2020. Please send a new prescription for Praluent to [facility] so that we can obtain a [employer] Dawit that is open until PASS Application is approved. The patient is on her last injection and application will take 1-2 months to be approved.  Thank you for your assistance    Maile Quintero   Patient Care Advocate   [facility]   [phone]

## 2020-01-08 ENCOUNTER — TELEPHONE (OUTPATIENT)
Dept: PHARMACY | Facility: CLINIC | Age: 70
End: 2020-01-08

## 2020-01-08 DIAGNOSIS — C90.00 MULTIPLE MYELOMA, REMISSION STATUS UNSPECIFIED: Primary | ICD-10-CM

## 2020-01-08 NOTE — TELEPHONE ENCOUNTER
Financial Assistance for Praluent/ Repatha approved from 12/9/19 to 12/8/20  [facility] [employer]  BIN: [id number]  PCN: [id number]  Id:  [id number]  GRP: [id number]  SAVINGS $ 1659

## 2020-01-09 ENCOUNTER — HOSPITAL ENCOUNTER (OUTPATIENT)
Facility: HOSPITAL | Age: 70
Discharge: HOME | End: 2020-01-09
Attending: INTERNAL MEDICINE | Admitting: INTERNAL MEDICINE
Payer: MEDICARE

## 2020-01-09 ENCOUNTER — ANESTHESIA EVENT (OUTPATIENT)
Dept: ENDOSCOPY | Facility: HOSPITAL | Age: 70
End: 2020-01-09
Payer: MEDICARE

## 2020-01-09 ENCOUNTER — ANESTHESIA (OUTPATIENT)
Dept: ENDOSCOPY | Facility: HOSPITAL | Age: 70
End: 2020-01-09
Payer: MEDICARE

## 2020-01-09 VITALS
BODY MASS INDEX: 28 KG/M2 | RESPIRATION RATE: 15 BRPM | WEIGHT: 158 LBS | HEIGHT: 63 IN | DIASTOLIC BLOOD PRESSURE: 82 MMHG | SYSTOLIC BLOOD PRESSURE: 132 MMHG | OXYGEN SATURATION: 98 % | TEMPERATURE: 98 F | HEART RATE: 65 BPM

## 2020-01-09 DIAGNOSIS — C90.00 MULTIPLE MYELOMA: ICD-10-CM

## 2020-01-09 DIAGNOSIS — Z86.010 HISTORY OF COLON POLYPS: ICD-10-CM

## 2020-01-09 PROBLEM — Z86.0100 HISTORY OF COLON POLYPS: Status: ACTIVE | Noted: 2020-01-09

## 2020-01-09 PROCEDURE — 37000008 HC ANESTHESIA 1ST 15 MINUTES: Mod: HCNC | Performed by: INTERNAL MEDICINE

## 2020-01-09 PROCEDURE — 88305 TISSUE EXAM BY PATHOLOGIST: ICD-10-PCS | Mod: 26,HCNC,, | Performed by: PATHOLOGY

## 2020-01-09 PROCEDURE — 63600175 PHARM REV CODE 636 W HCPCS: Mod: HCNC | Performed by: INTERNAL MEDICINE

## 2020-01-09 PROCEDURE — 45385 PR COLONOSCOPY,REMV LESN,SNARE: ICD-10-PCS | Mod: 22,PT,HCNC,GC | Performed by: INTERNAL MEDICINE

## 2020-01-09 PROCEDURE — 63600175 PHARM REV CODE 636 W HCPCS: Mod: HCNC | Performed by: NURSE ANESTHETIST, CERTIFIED REGISTERED

## 2020-01-09 PROCEDURE — 43235 EGD DIAGNOSTIC BRUSH WASH: CPT | Mod: HCNC | Performed by: INTERNAL MEDICINE

## 2020-01-09 PROCEDURE — 45385 COLONOSCOPY W/LESION REMOVAL: CPT | Mod: 22,PT,HCNC,GC | Performed by: INTERNAL MEDICINE

## 2020-01-09 PROCEDURE — E9220 PRA ENDO ANESTHESIA: HCPCS | Mod: HCNC,,, | Performed by: NURSE ANESTHETIST, CERTIFIED REGISTERED

## 2020-01-09 PROCEDURE — 43235 PR EGD, FLEX, DIAGNOSTIC: ICD-10-PCS | Mod: 51,HCNC,GC, | Performed by: INTERNAL MEDICINE

## 2020-01-09 PROCEDURE — 37000009 HC ANESTHESIA EA ADD 15 MINS: Mod: HCNC | Performed by: INTERNAL MEDICINE

## 2020-01-09 PROCEDURE — E9220 PRA ENDO ANESTHESIA: ICD-10-PCS | Mod: HCNC,,, | Performed by: NURSE ANESTHETIST, CERTIFIED REGISTERED

## 2020-01-09 PROCEDURE — 88305 TISSUE EXAM BY PATHOLOGIST: CPT | Mod: 26,HCNC,, | Performed by: PATHOLOGY

## 2020-01-09 PROCEDURE — 27201089 HC SNARE, DISP (ANY): Mod: HCNC | Performed by: INTERNAL MEDICINE

## 2020-01-09 PROCEDURE — 45385 COLONOSCOPY W/LESION REMOVAL: CPT | Mod: HCNC | Performed by: INTERNAL MEDICINE

## 2020-01-09 PROCEDURE — 88305 TISSUE EXAM BY PATHOLOGIST: CPT | Mod: HCNC | Performed by: PATHOLOGY

## 2020-01-09 PROCEDURE — 43235 EGD DIAGNOSTIC BRUSH WASH: CPT | Mod: 51,HCNC,GC, | Performed by: INTERNAL MEDICINE

## 2020-01-09 RX ORDER — PROPOFOL 10 MG/ML
VIAL (ML) INTRAVENOUS CONTINUOUS PRN
Status: DISCONTINUED | OUTPATIENT
Start: 2020-01-09 | End: 2020-01-09

## 2020-01-09 RX ORDER — PHENYLEPHRINE HYDROCHLORIDE 10 MG/ML
INJECTION INTRAVENOUS
Status: DISCONTINUED | OUTPATIENT
Start: 2020-01-09 | End: 2020-01-09

## 2020-01-09 RX ORDER — SODIUM CHLORIDE 9 MG/ML
INJECTION, SOLUTION INTRAVENOUS CONTINUOUS
Status: DISCONTINUED | OUTPATIENT
Start: 2020-01-09 | End: 2020-01-09 | Stop reason: HOSPADM

## 2020-01-09 RX ORDER — LIDOCAINE HCL/PF 100 MG/5ML
SYRINGE (ML) INTRAVENOUS
Status: DISCONTINUED | OUTPATIENT
Start: 2020-01-09 | End: 2020-01-09

## 2020-01-09 RX ORDER — SODIUM CHLORIDE 9 MG/ML
INJECTION, SOLUTION INTRAVENOUS CONTINUOUS
Status: CANCELLED | OUTPATIENT
Start: 2020-01-09

## 2020-01-09 RX ORDER — PROPOFOL 10 MG/ML
VIAL (ML) INTRAVENOUS
Status: DISCONTINUED | OUTPATIENT
Start: 2020-01-09 | End: 2020-01-09

## 2020-01-09 RX ADMIN — PROPOFOL 20 MG: 10 INJECTION, EMULSION INTRAVENOUS at 09:01

## 2020-01-09 RX ADMIN — PHENYLEPHRINE HYDROCHLORIDE 100 MCG: 10 INJECTION INTRAVENOUS at 10:01

## 2020-01-09 RX ADMIN — PROPOFOL 20 MG: 10 INJECTION, EMULSION INTRAVENOUS at 10:01

## 2020-01-09 RX ADMIN — LIDOCAINE HYDROCHLORIDE 50 MG: 20 INJECTION, SOLUTION INTRAVENOUS at 09:01

## 2020-01-09 RX ADMIN — PROPOFOL 200 MCG/KG/MIN: 10 INJECTION, EMULSION INTRAVENOUS at 09:01

## 2020-01-09 RX ADMIN — PROPOFOL 70 MG: 10 INJECTION, EMULSION INTRAVENOUS at 09:01

## 2020-01-09 RX ADMIN — SODIUM CHLORIDE: 0.9 INJECTION, SOLUTION INTRAVENOUS at 10:01

## 2020-01-09 RX ADMIN — SODIUM CHLORIDE: 0.9 INJECTION, SOLUTION INTRAVENOUS at 09:01

## 2020-01-09 NOTE — ANESTHESIA POSTPROCEDURE EVALUATION
Anesthesia Post Evaluation    Patient: Shelby Thompson    Procedure(s) Performed: Procedure(s) (LRB):  EGD (ESOPHAGOGASTRODUODENOSCOPY) (N/A)  COLONOSCOPY (N/A)    Final Anesthesia Type: general    Patient location during evaluation: PACU  Patient participation: Yes- Able to Participate  Level of consciousness: awake and alert and oriented  Post-procedure vital signs: reviewed and stable  Pain management: adequate  Airway patency: patent    PONV status at discharge: No PONV  Anesthetic complications: no      Cardiovascular status: hemodynamically stable  Respiratory status: unassisted  Hydration status: euvolemic  Follow-up not needed.          Vitals Value Taken Time   /82 1/9/2020 11:26 AM   Temp 36.4 °C (97.5 °F) 1/9/2020 11:06 AM   Pulse 65 1/9/2020 11:26 AM   Resp 15 1/9/2020 11:26 AM   SpO2 98 % 1/9/2020 11:26 AM         Event Time     Out of Recovery 11:27:30          Pain/Yana Score: Yana Score: 10 (1/9/2020 11:26 AM)

## 2020-01-09 NOTE — TRANSFER OF CARE
"Anesthesia Transfer of Care Note    Patient: Shelby Thompson    Procedure(s) Performed: Procedure(s) (LRB):  EGD (ESOPHAGOGASTRODUODENOSCOPY) (N/A)  COLONOSCOPY (N/A)    Patient location: OPS    Anesthesia Type: general    Transport from OR: Transported from OR on room air with adequate spontaneous ventilation    Post pain: adequate analgesia    Post assessment: no apparent anesthetic complications and tolerated procedure well    Post vital signs: stable    Level of consciousness: awake and alert    Nausea/Vomiting: no nausea/vomiting    Complications: none          Last vitals:   Visit Vitals  /62 (BP Location: Left arm, Patient Position: Lying)   Pulse 71   Temp 36.4 °C (97.5 °F) (Temporal)   Resp 16   Ht 5' 3" (1.6 m)   Wt 71.7 kg (158 lb)   LMP  (LMP Unknown)   SpO2 99%   Breastfeeding? No   BMI 27.99 kg/m²     "

## 2020-01-09 NOTE — ANESTHESIA PREPROCEDURE EVALUATION
2020  Shelby Thompson is a 69 y.o., female.  Pre-operative evaluation for EGD (ESOPHAGOGASTRODUODENOSCOPY) (N/A), COLONOSCOPY (N/A)    Chief Complaint: esophageal stricture    PMH:  CVA  and  with right sided weakness  Budd Chiari  Multiple myeloma- stem cell transplant candidate -screening today  CRI  Liver lesions  Past Surgical History:   Procedure Laterality Date    APPENDECTOMY      BONE MARROW BIOPSY Left 10/3/2019    Procedure: Biopsy-bone marrow;  Surgeon: Jere Tineo MD;  Location: [facility] OR [address] [place] [address];  Service: Oncology;  Laterality: Left;    BREAST BIOPSY Left 10/2011    BREAST LUMPECTOMY Left     DCIS    COLONOSCOPY      CYST REMOVAL      back    ESOPHAGOGASTRODUODENOSCOPY  2016    duodenal angioectasia    FOOT FRACTURE SURGERY  10/2012    right foot, with R hallux valgus repair    FRACTURE SURGERY Right     broken toe repiar    HEMORRHOID SURGERY      LIVER BIOPSY  2015    essentially normal, no fibrosis    TUBAL LIGATION      UPPER GASTROINTESTINAL ENDOSCOPY           Vital Signs Range (Last 24H):  Temp:  [36.6 °C (97.9 °F)]   Pulse:  [85]   Resp:  [20]   BP: (135)/(80)   SpO2:  [95 %]       CBC:     Recent Labs   Lab 12/10/19  1150 20  1509   WBC 4.90 4.99   RBC 4.94 4.93   HGB 10.6* 11.0*   HCT 37.3 39.8    254   MCV 76* 81*   MCH 21.5* 22.3*   MCHC 28.4* 27.6*       CMP:   Recent Labs   Lab 12/10/19  1150 20  1509    144   K 3.4* 3.4*    106   CO2 26 28   BUN 10 11   CREATININE 1.1 0.8   * 80   CALCIUM 9.3 9.9   ALBUMIN 4.0 3.7   PROT 8.0 7.4   ALKPHOS 70 83   ALT 9* 23   AST 15 21   BILITOT 0.6 0.2       INR:  No results for input(s): PT, INR, PROTIME, APTT in the last 720 hours.      Diagnostic Studies:      EKD Echo:  Anesthesia Evaluation    I have reviewed the Patient Summary Reports.    I have  reviewed the Nursing Notes.   I have reviewed the Medications.     Review of Systems  Anesthesia Hx:  No problems with previous Anesthesia    Pulmonary:  Pulmonary Normal        Physical Exam  General:  Obesity    Airway/Jaw/Neck:  Airway Findings: Mouth Opening: Normal Tongue: Normal  Mallampati: I  TM Distance: Normal, at least 6 cm  Jaw/Neck Findings:  Neck ROM: Normal ROM      Dental:  Dental Findings: Upper Dentures, Lower Dentures   Chest/Lungs:  Chest/Lungs Findings: Normal Respiratory Rate     Heart/Vascular:  Heart Findings: Rate: Normal  Rhythm: Regular Rhythm        Mental Status:  Mental Status Findings:  Cooperative, Alert and Oriented         Anesthesia Plan  Type of Anesthesia, risks & benefits discussed:  Anesthesia Type:  general  Patient's Preference:   Intra-op Monitoring Plan: standard ASA monitors  Intra-op Monitoring Plan Comments:   Post Op Pain Control Plan:   Post Op Pain Control Plan Comments:   Induction:   IV  Beta Blocker:  Patient is not currently on a Beta-Blocker (No further documentation required).       Informed Consent: Patient understands risks and agrees with Anesthesia plan.  Questions answered. Anesthesia consent signed with patient.  ASA Score: 3     Day of Surgery Review of History & Physical:    H&P update referred to the surgeon.         Ready For Surgery From Anesthesia Perspective.

## 2020-01-09 NOTE — DISCHARGE INSTRUCTIONS

## 2020-01-09 NOTE — [employer] PATIENT INSTRUCTIONS
Discharge Summary/Instructions after an Endoscopic Procedure  Patient Name: Shelby Thompson  Patient MRN: [id number]  Patient YOB: 1950  Thursday, January 09, 2020  Quang De La Cruz MD  RESTRICTIONS:  During your procedure today, you received medications for sedation.  These   medications may affect your judgment, balance and coordination.  Therefore,   for 24 hours, you have the following restrictions:   - DO NOT drive a car, operate machinery, make legal/financial decisions,   sign important papers or drink alcohol.    ACTIVITY:  Today: no heavy lifting, straining or running due to procedural   sedation/anesthesia.  The following day: return to full activity including work.  DIET:  Eat and drink normally unless instructed otherwise.     TREATMENT FOR COMMON SIDE EFFECTS:  - Mild abdominal pain, nausea, belching, bloating or excessive gas:  rest,   eat lightly and use a heating pad.  - Sore Throat: treat with throat lozenges and/or gargle with warm salt   water.  - Because air was used during the procedure, expelling large amounts of air   from your rectum or belching is normal.  - If a bowel prep was taken, you may not have a bowel movement for 1-3 days.    This is normal.  SYMPTOMS TO WATCH FOR AND REPORT TO YOUR PHYSICIAN:  1. Abdominal pain or bloating, other than gas cramps.  2. Chest pain.  3. Back pain.  4. Signs of infection such as: chills or fever occurring within 24 hours   after the procedure.  5. Rectal bleeding, which would show as bright red, maroon, or black stools.   (A tablespoon of blood from the rectum is not serious, especially if   hemorrhoids are present.)  6. Vomiting.  7. Weakness or dizziness.  GO DIRECTLY TO THE NEAREST EMERGENCY ROOM IF YOU HAVE ANY OF THE FOLLOWING:      Difficulty breathing              Chills and/or fever over 101 F   Persistent vomiting and/or vomiting blood   Severe abdominal pain   Severe chest pain   Black, tarry stools   Bleeding- more than one  tablespoon   Any other symptom or condition that you feel may need urgent attention  Your doctor recommends these additional instructions:  If any biopsies were taken, your doctors clinic will contact you in 1 to 2   weeks with any results.  - Discharge patient to home.   - Patient has a contact number available for emergencies.  The signs and   symptoms of potential delayed complications were discussed with the   patient.  Return to normal activities tomorrow.  Written discharge   instructions were provided to the patient.   - Resume previous diet.   - Continue present medications.   - Await pathology results.   - Telephone GI clinic for pathology results in 2 weeks.   - Repeat colonoscopy in 5 years for surveillance.  For questions, problems or results please call your physician - Quang De La Cruz MD at Work:  [phone].  [facility], EMERGENCY ROOM PHONE NUMBER: [phone]  IF A COMPLICATION OR EMERGENCY SITUATION ARISES AND YOU ARE UNABLE TO REACH   YOUR PHYSICIAN - GO DIRECTLY TO THE EMERGENCY ROOM.  Quang De La Cruz MD  1/9/2020 11:03:01 AM  This report has been verified and signed electronically.  [employer]

## 2020-01-09 NOTE — H&P
Short Stay Endoscopy History and Physical    PCP - Emanuel Arevalo MD  Referring Physician - Nicolasa Mao, NP  [address] [place] [address]    Procedure - EGD and Colonoscopy  ASA - per anesthesia  Mallampati - per anesthesia  History of Anesthesia problems - no  Family history Anesthesia problems -  no   Plan of anesthesia - General    HPI  69 y.o. female here for colonoscopy for surveillance of polyps, last one was in 2015. She has also had intermittent belching, she takes prilosec 40 daily(which helps), denies weight loss, dysphagia, odynophagia, abdominal pain. She has history of mild obstructing schatzki ring and non bleeding avm in duodenem.       ROS:  Constitutional: No fevers, chills, No weight loss  CV: No chest pain  Pulm: No cough, No shortness of breath  GI: see HPI    Medical History:  has a past medical history of Acute respiratory failure with hypoxia (4/30/2019), FUENTES (acute kidney injury) (5/1/2019), Allergy, Anemia, Aortic aneurysm, Breast cancer (10/2011), Chronic diastolic congestive heart failure (11/6/2015), Colon polyp, GERD (gastroesophageal reflux disease), History of colonic polyps, psychiatric care, breast cancer, Hyperlipemia, Hypertension, ICH (intracerebral hemorrhage), Major depressive disorder, single episode, mild (6/23/2016), Nuclear sclerosis (7/21/2014), 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 (7/21/2014), PAD (peripheral artery disease) (11/6/2015), Psychiatric problem, Statin-induced rhabdomyolysis, and Stroke (4/2011).    Surgical History:  has a past surgical history that includes Appendectomy; Tubal ligation; Hemorrhoid surgery; Foot fracture surgery (10/2012); Colonoscopy; Upper gastrointestinal endoscopy; Liver biopsy (04/2015); Fracture surgery (Right); Cyst Removal; Esophagogastroduodenoscopy (07/2016); Breast biopsy (Left, 10/2011); Breast lumpectomy (Left, 2011); and Bone marrow biopsy (Left, 10/3/2019).    Family  History: family history includes Breast cancer (age of onset: 62) in her sister; Cancer in her father; Cancer (age of onset: 63) in her sister; Fibroids in her daughter; Hypertension in her daughter, maternal grandmother, and son; No Known Problems in her brother, brother, maternal aunt, maternal grandfather, maternal uncle, mother, paternal aunt, paternal grandfather, paternal grandmother, paternal uncle, and sister., see HPI    Social History:  reports that she quit smoking about 8 years ago. Her smoking use included cigarettes. She has a 10.00 pack-year smoking history. She quit smokeless tobacco use about 8 years ago. She reports that she drinks alcohol. She reports that she does not use drugs.    Review of patient's allergies indicates:   Allergen Reactions    Lipitor [atorvastatin]      Itching, elevated cpk, muscle aches, statin induced myositis       Medications:   Facility-Administered Medications Prior to Admission   Medication Dose Route Frequency Provider Last Rate Last Dose    zoledronic acid (ZOMETA) 4 mg in sodium chloride 0.9% 100 mL IVPB  4 mg Intravenous 1 time in Clinic/HOD Genny Napoles MD         Medications Prior to Admission   Medication Sig Dispense Refill Last Dose    acetaminophen (TYLENOL) 650 MG TbSR Take 1,300 mg by mouth daily as needed (pain).   Taking    acyclovir (ZOVIRAX) 400 MG tablet Take 1 tablet (400 mg total) by mouth 2 (two) times daily. (Patient not taking: Reported on 1/6/2020) 120 tablet 2 Not Taking    alirocumab (PRALUENT PEN) 75 mg/mL PnIj Inject 1 mL (75 mg total) into the skin every 14 (fourteen) days. 6 Syringe 3     allopurinol (ZYLOPRIM) 300 MG tablet Take 1 tablet (300 mg total) by mouth once daily. (Patient not taking: Reported on 1/6/2020) 30 tablet 1 Not Taking    amLODIPine (NORVASC) 5 MG tablet TAKE 1 TABLET BY MOUTH EVERY DAY 90 tablet 3 Taking    aspirin 81 mg Tab Take 1 tablet by mouth Daily.   Taking    bisacodyl (DULCOLAX) 5 mg EC tablet Take  5 mg by mouth once daily.   Taking    ferrous gluconate 324 mg (37.5 mg iron) Tab TAKE 1 TABLET BY MOUTH 2 (TWO) TIMES DAILY WITH MEALS. 180 tablet 0 Taking    gabapentin (NEURONTIN) 300 MG capsule TAKE 1 CAPSULE (300 MG TOTAL) BY MOUTH 2 (TWO) TIMES DAILY. 180 capsule 3 Taking    omeprazole (PRILOSEC) 40 MG capsule TAKE 1 CAPSULE BY MOUTH EVERY DAY 90 capsule 3 Taking    ondansetron (ZOFRAN) 4 MG tablet Take 1 tablet (4 mg total) by mouth every 6 (six) hours as needed for Nausea. 30 tablet 1 Taking    ondansetron (ZOFRAN-ODT) 8 MG TbDL Take 1 tablet (8 mg total) by mouth every 8 (eight) hours as needed (nausea/vomiting). (Patient not taking: Reported on 1/6/2020) 60 tablet 1 Not Taking    REVLIMID 25 mg Cap TAKE 1 CAPSULE BY MOUTH EVERY DAY (Patient not taking: Reported on 1/6/2020) 21 each 0 Not Taking    sertraline (ZOLOFT) 50 MG tablet TAKE 1 TABLET (50 MG TOTAL) BY MOUTH ONCE DAILY. (Patient not taking: Reported on 1/6/2020) 90 tablet 3 Not Taking    traMADol (ULTRAM) 50 mg tablet Take 1 tablet (50 mg total) by mouth 2 (two) times daily as needed for Pain. 60 tablet 3 Taking    vitamin D 1000 units Tab Take 185 mg by mouth once daily.   Taking       Physical Exam:    Vital Signs: There were no vitals filed for this visit.    General Appearance: Well appearing in no acute distress  Abdomen: Soft, non tender, non distended with normal bowel sounds, no masses    Labs:  Lab Results   Component Value Date    WBC 4.99 01/02/2020    HGB 11.0 (L) 01/02/2020    HCT 39.8 01/02/2020     01/02/2020    CHOL 150 09/11/2018    TRIG 137 09/11/2018    HDL 43 09/11/2018    ALT 23 01/02/2020    AST 21 01/02/2020     01/02/2020    K 3.4 (L) 01/02/2020     01/02/2020    CREATININE 0.8 01/02/2020    BUN 11 01/02/2020    CO2 28 01/02/2020    TSH 3.268 04/30/2019    INR 0.9 10/15/2019    HGBA1C 7.4 (H) 03/27/2019       I have explained the risks and benefits of this endoscopic procedure to the patient  including but not limited to bleeding, inflammation, infection, perforation, and death.      Tommy Eisenberg MD

## 2020-01-09 NOTE — [employer]OVATION PATIENT INSTRUCTIONS
Discharge Summary/Instructions after an Endoscopic Procedure  Patient Name: Shelby Thompson  Patient MRN: [id number]  Patient YOB: 1950  Thursday, January 09, 2020  Quang De La Cruz MD  RESTRICTIONS:  During your procedure today, you received medications for sedation.  These   medications may affect your judgment, balance and coordination.  Therefore,   for 24 hours, you have the following restrictions:   - DO NOT drive a car, operate machinery, make legal/financial decisions,   sign important papers or drink alcohol.    ACTIVITY:  Today: no heavy lifting, straining or running due to procedural   sedation/anesthesia.  The following day: return to full activity including work.  DIET:  Eat and drink normally unless instructed otherwise.     TREATMENT FOR COMMON SIDE EFFECTS:  - Mild abdominal pain, nausea, belching, bloating or excessive gas:  rest,   eat lightly and use a heating pad.  - Sore Throat: treat with throat lozenges and/or gargle with warm salt   water.  - Because air was used during the procedure, expelling large amounts of air   from your rectum or belching is normal.  - If a bowel prep was taken, you may not have a bowel movement for 1-3 days.    This is normal.  SYMPTOMS TO WATCH FOR AND REPORT TO YOUR PHYSICIAN:  1. Abdominal pain or bloating, other than gas cramps.  2. Chest pain.  3. Back pain.  4. Signs of infection such as: chills or fever occurring within 24 hours   after the procedure.  5. Rectal bleeding, which would show as bright red, maroon, or black stools.   (A tablespoon of blood from the rectum is not serious, especially if   hemorrhoids are present.)  6. Vomiting.  7. Weakness or dizziness.  GO DIRECTLY TO THE NEAREST EMERGENCY ROOM IF YOU HAVE ANY OF THE FOLLOWING:      Difficulty breathing              Chills and/or fever over 101 F   Persistent vomiting and/or vomiting blood   Severe abdominal pain   Severe chest pain   Black, tarry stools   Bleeding- more than one  tablespoon   Any other symptom or condition that you feel may need urgent attention  Your doctor recommends these additional instructions:  If any biopsies were taken, your doctors clinic will contact you in 1 to 2   weeks with any results.  - Perform a colonoscopy now, for surveillance of polyps.   - See colonoscopy report for further details.  For questions, problems or results please call your physician - Quang De La Cruz MD at Work:  [phone].  [facility], EMERGENCY ROOM PHONE NUMBER: [phone]  IF A COMPLICATION OR EMERGENCY SITUATION ARISES AND YOU ARE UNABLE TO REACH   YOUR PHYSICIAN - GO DIRECTLY TO THE EMERGENCY ROOM.  Quang De La Cruz MD  1/9/2020 10:04:20 AM  This report has been verified and signed electronically.  [employer]

## 2020-01-14 LAB
FINAL PATHOLOGIC DIAGNOSIS: NORMAL
GROSS: NORMAL

## 2020-01-20 ENCOUNTER — PATIENT MESSAGE (OUTPATIENT)
Dept: HEMATOLOGY/ONCOLOGY | Facility: CLINIC | Age: 70
End: 2020-01-20

## 2020-01-20 ENCOUNTER — TELEPHONE (OUTPATIENT)
Dept: HEMATOLOGY/ONCOLOGY | Facility: CLINIC | Age: 70
End: 2020-01-20

## 2020-01-20 NOTE — TELEPHONE ENCOUNTER
----- Message from Kenya Vu sent at 1/20/2020 12:05 PM CST -----  Contact: pt  Pt would like to be called back regarding appt being canceled  Pt can be reached at  [phone]

## 2020-01-21 ENCOUNTER — PATIENT MESSAGE (OUTPATIENT)
Dept: GASTROENTEROLOGY | Facility: CLINIC | Age: 70
End: 2020-01-21

## 2020-01-23 DIAGNOSIS — C90.01 MULTIPLE MYELOMA IN REMISSION: Primary | ICD-10-CM

## 2020-01-23 DIAGNOSIS — Z76.82 STEM CELL TRANSPLANT CANDIDATE: ICD-10-CM

## 2020-01-23 NOTE — PROGRESS NOTES
Updated dates for existing mobilization orders.    Thuy Nelson, FNP  Hematology/Oncology/Bone Marrow Transplant

## 2020-01-24 DIAGNOSIS — C90.00 MULTIPLE MYELOMA, REMISSION STATUS UNSPECIFIED: Primary | ICD-10-CM

## 2020-01-27 ENCOUNTER — LAB VISIT (OUTPATIENT)
Dept: LAB | Facility: HOSPITAL | Age: 70
End: 2020-01-27
Attending: INTERNAL MEDICINE
Payer: MEDICARE

## 2020-01-27 DIAGNOSIS — C90.01 MULTIPLE MYELOMA IN REMISSION: ICD-10-CM

## 2020-01-27 DIAGNOSIS — Z76.82 STEM CELL TRANSPLANT CANDIDATE: ICD-10-CM

## 2020-01-27 PROCEDURE — 87535 HIV-1 PROBE&REVERSE TRNSCRPJ: CPT | Mod: HCNC

## 2020-01-27 PROCEDURE — 86687 HTLV-I ANTIBODY: CPT | Mod: HCNC

## 2020-01-27 PROCEDURE — 86592 SYPHILIS TEST NON-TREP QUAL: CPT | Mod: HCNC

## 2020-01-27 PROCEDURE — 87340 HEPATITIS B SURFACE AG IA: CPT | Mod: HCNC

## 2020-01-27 PROCEDURE — 86803 HEPATITIS C AB TEST: CPT | Mod: HCNC

## 2020-01-27 PROCEDURE — 86644 CMV ANTIBODY: CPT | Mod: HCNC

## 2020-01-27 PROCEDURE — 86753 PROTOZOA ANTIBODY NOS: CPT | Mod: HCNC

## 2020-01-27 PROCEDURE — 36415 COLL VENOUS BLD VENIPUNCTURE: CPT | Mod: HCNC

## 2020-01-27 PROCEDURE — 86703 HIV-1/HIV-2 1 RESULT ANTBDY: CPT | Mod: HCNC

## 2020-01-27 PROCEDURE — 86704 HEP B CORE ANTIBODY TOTAL: CPT | Mod: HCNC

## 2020-01-28 LAB
CHAGAS AB, DONOR EVAL (BLOOD CENTER): NORMAL
CMV AB TOTAL, DONOR EVAL (BLOOD CENTER): REACTIVE
HEPATITIS B CORE  AB, DONOR EVAL, (BLOOD CENTER): NORMAL
HEPATITIS B SURFACE AG, DONOR EVAL, (BLOOD CENTER): NORMAL
HEPATITIS C ANTIBODY,DONOR EVAL (BLOOD CENTER): NORMAL
HIV1/2 AG/AB, DONOR EVAL (BLOOD CENTER): NORMAL
HTLV I/II ANTIBODY, DONOR EVAL (BLOOD EVAL): NORMAL
NAT PANEL, DONOR EVAL (BLOOD CENTER): NORMAL
RPR, DONOR EVAL (BLOOD CENTER): NORMAL

## 2020-01-30 DIAGNOSIS — C90.00 MULTIPLE MYELOMA: ICD-10-CM

## 2020-01-30 RX ORDER — SODIUM CHLORIDE 9 MG/ML
INJECTION, SOLUTION INTRAVENOUS CONTINUOUS
Status: CANCELLED | OUTPATIENT
Start: 2020-01-30

## 2020-01-31 ENCOUNTER — TELEPHONE (OUTPATIENT)
Dept: SURGERY | Facility: CLINIC | Age: 70
End: 2020-01-31

## 2020-01-31 ENCOUNTER — INFUSION (OUTPATIENT)
Dept: INFUSION THERAPY | Facility: HOSPITAL | Age: 70
End: 2020-01-31
Attending: INTERNAL MEDICINE
Payer: MEDICARE

## 2020-01-31 DIAGNOSIS — C90.00 MULTIPLE MYELOMA NOT HAVING ACHIEVED REMISSION: ICD-10-CM

## 2020-01-31 DIAGNOSIS — Z76.82 STEM CELL TRANSPLANT CANDIDATE: Primary | ICD-10-CM

## 2020-01-31 PROCEDURE — 63600175 PHARM REV CODE 636 W HCPCS: Mod: JG,HCNC | Performed by: NURSE PRACTITIONER

## 2020-01-31 PROCEDURE — 96372 THER/PROPH/DIAG INJ SC/IM: CPT | Mod: HCNC

## 2020-01-31 RX ORDER — POLYETHYLENE GLYCOL-3350 AND ELECTROLYTES 236; 6.74; 5.86; 2.97; 22.74 G/274.31G; G/274.31G; G/274.31G; G/274.31G; G/274.31G
POWDER, FOR SOLUTION ORAL
COMMUNITY
Start: 2020-01-06 | End: 2020-02-10

## 2020-01-31 RX ORDER — LORATADINE 10 MG/1
10 TABLET ORAL NIGHTLY
COMMUNITY
End: 2021-05-13

## 2020-01-31 RX ADMIN — FILGRASTIM 780 MCG: 480 INJECTION, SOLUTION INTRAVENOUS; SUBCUTANEOUS at 08:01

## 2020-01-31 NOTE — NURSING
Patient received D1 Neupogen injection SQ to left lower ABD x 2.  Tolerated well.  Educated patient on medication prior to receiving.  RTC tomorrow at 0800 for D2 injection.

## 2020-01-31 NOTE — PRE-PROCEDURE INSTRUCTIONS
PreOp Instructions given:     - Verbal medication information (what to hold and what to take)   - NPO guidelines   - Arrival place directions given; time to be given the day before procedure by the   Surgeon's Office   - Bathing with antibacterial soap   - Don't wear any jewelry or bring any valuables AM of surgery   - No makeup or moisturizer to face   - No perfume/cologne, powder, lotions or aftershave   Pt. verbalized understanding.     Pt denies any h/o Anesthesia/Sedation complications or side effects.    ARASH Lal RN w/Dr. Jasso - states she will be calling pt in ~ 1 hr to give an arrival time and other update to pt's scheduled appts on Monday.    ARRIVAL TO [facility] - PT AWARE THAT MG WILL BE CALLING W/ARRIVAL TIME.  PT'S  - [facility] WILL BE PROVIDING TRANSPORTATION HOME UPON DISCHARGE.

## 2020-02-01 ENCOUNTER — INFUSION (OUTPATIENT)
Dept: INFUSION THERAPY | Facility: HOSPITAL | Age: 70
End: 2020-02-01
Attending: INTERNAL MEDICINE
Payer: MEDICARE

## 2020-02-01 VITALS — WEIGHT: 157.88 LBS | HEIGHT: 63 IN | BODY MASS INDEX: 27.97 KG/M2

## 2020-02-01 DIAGNOSIS — Z76.82 STEM CELL TRANSPLANT CANDIDATE: Primary | ICD-10-CM

## 2020-02-01 DIAGNOSIS — C90.00 MULTIPLE MYELOMA NOT HAVING ACHIEVED REMISSION: ICD-10-CM

## 2020-02-01 PROCEDURE — 96372 THER/PROPH/DIAG INJ SC/IM: CPT | Mod: HCNC

## 2020-02-01 PROCEDURE — 63600175 PHARM REV CODE 636 W HCPCS: Mod: JG,HCNC | Performed by: NURSE PRACTITIONER

## 2020-02-01 RX ADMIN — FILGRASTIM 780 MCG: 480 INJECTION, SOLUTION INTRAVENOUS; SUBCUTANEOUS at 08:02

## 2020-02-02 ENCOUNTER — INFUSION (OUTPATIENT)
Dept: INFUSION THERAPY | Facility: HOSPITAL | Age: 70
End: 2020-02-02
Attending: INTERNAL MEDICINE
Payer: MEDICARE

## 2020-02-02 VITALS — BODY MASS INDEX: 27.97 KG/M2 | WEIGHT: 157.88 LBS | HEIGHT: 63 IN

## 2020-02-02 DIAGNOSIS — C90.00 MULTIPLE MYELOMA NOT HAVING ACHIEVED REMISSION: ICD-10-CM

## 2020-02-02 DIAGNOSIS — Z76.82 STEM CELL TRANSPLANT CANDIDATE: Primary | ICD-10-CM

## 2020-02-02 PROCEDURE — 63600175 PHARM REV CODE 636 W HCPCS: Mod: JG,HCNC | Performed by: NURSE PRACTITIONER

## 2020-02-02 PROCEDURE — 96372 THER/PROPH/DIAG INJ SC/IM: CPT | Mod: HCNC

## 2020-02-02 RX ADMIN — FILGRASTIM 780 MCG: 480 INJECTION, SOLUTION INTRAVENOUS; SUBCUTANEOUS at 07:02

## 2020-02-03 ENCOUNTER — ANESTHESIA EVENT (OUTPATIENT)
Dept: SURGERY | Facility: HOSPITAL | Age: 70
End: 2020-02-03
Payer: MEDICARE

## 2020-02-03 ENCOUNTER — ANESTHESIA (OUTPATIENT)
Dept: SURGERY | Facility: HOSPITAL | Age: 70
End: 2020-02-03
Payer: MEDICARE

## 2020-02-03 ENCOUNTER — TELEPHONE (OUTPATIENT)
Dept: HEMATOLOGY/ONCOLOGY | Facility: CLINIC | Age: 70
End: 2020-02-03

## 2020-02-03 ENCOUNTER — HOSPITAL ENCOUNTER (OUTPATIENT)
Facility: HOSPITAL | Age: 70
Discharge: HOME | End: 2020-02-03
Attending: SURGERY | Admitting: SURGERY
Payer: MEDICARE

## 2020-02-03 ENCOUNTER — INFUSION (OUTPATIENT)
Dept: INFUSION THERAPY | Facility: HOSPITAL | Age: 70
End: 2020-02-03
Attending: INTERNAL MEDICINE
Payer: MEDICARE

## 2020-02-03 VITALS
SYSTOLIC BLOOD PRESSURE: 132 MMHG | HEART RATE: 77 BPM | HEIGHT: 63 IN | BODY MASS INDEX: 28.21 KG/M2 | RESPIRATION RATE: 16 BRPM | WEIGHT: 159.19 LBS | TEMPERATURE: 98 F | OXYGEN SATURATION: 97 % | DIASTOLIC BLOOD PRESSURE: 81 MMHG

## 2020-02-03 DIAGNOSIS — C90.00 MULTIPLE MYELOMA NOT HAVING ACHIEVED REMISSION: ICD-10-CM

## 2020-02-03 DIAGNOSIS — Z52.011 AUTOLOGOUS DONOR OF STEM CELLS: ICD-10-CM

## 2020-02-03 DIAGNOSIS — D69.6 THROMBOCYTOPENIA: ICD-10-CM

## 2020-02-03 DIAGNOSIS — Z76.82 STEM CELL TRANSPLANT CANDIDATE: ICD-10-CM

## 2020-02-03 DIAGNOSIS — C90.00 MULTIPLE MYELOMA: Primary | ICD-10-CM

## 2020-02-03 DIAGNOSIS — Z76.82 STEM CELL TRANSPLANT CANDIDATE: Primary | ICD-10-CM

## 2020-02-03 PROCEDURE — C1750 CATH, HEMODIALYSIS,LONG-TERM: HCPCS | Mod: HCNC | Performed by: SURGERY

## 2020-02-03 PROCEDURE — 77001 FLUOROGUIDE FOR VEIN DEVICE: CPT | Mod: 26,HCNC,, | Performed by: SURGERY

## 2020-02-03 PROCEDURE — 36000707: Mod: HCNC | Performed by: SURGERY

## 2020-02-03 PROCEDURE — 63600175 PHARM REV CODE 636 W HCPCS: Mod: HCNC | Performed by: SURGERY

## 2020-02-03 PROCEDURE — D9220A PRA ANESTHESIA: Mod: HCNC,,, | Performed by: ANESTHESIOLOGY

## 2020-02-03 PROCEDURE — D9220A PRA ANESTHESIA: Mod: HCNC,,, | Performed by: NURSE ANESTHETIST, CERTIFIED REGISTERED

## 2020-02-03 PROCEDURE — 96372 THER/PROPH/DIAG INJ SC/IM: CPT | Mod: HCNC

## 2020-02-03 PROCEDURE — 77001 CHG FLUOROGUIDE CNTRL VEN ACCESS,PLACE,REPLACE,REMOVE: ICD-10-PCS | Mod: 26,HCNC,, | Performed by: SURGERY

## 2020-02-03 PROCEDURE — 63600175 PHARM REV CODE 636 W HCPCS: Mod: JG,HCNC | Performed by: NURSE PRACTITIONER

## 2020-02-03 PROCEDURE — D9220A PRA ANESTHESIA: ICD-10-PCS | Mod: HCNC,,, | Performed by: ANESTHESIOLOGY

## 2020-02-03 PROCEDURE — 25000003 PHARM REV CODE 250: Mod: HCNC | Performed by: SURGERY

## 2020-02-03 PROCEDURE — 36000706: Mod: HCNC | Performed by: SURGERY

## 2020-02-03 PROCEDURE — 71000044 HC DOSC ROUTINE RECOVERY FIRST HOUR: Mod: HCNC | Performed by: SURGERY

## 2020-02-03 PROCEDURE — 63600175 PHARM REV CODE 636 W HCPCS: Mod: HCNC | Performed by: PHYSICIAN ASSISTANT

## 2020-02-03 PROCEDURE — 71000015 HC POSTOP RECOV 1ST HR: Mod: HCNC | Performed by: SURGERY

## 2020-02-03 PROCEDURE — 36558 PR INSERT TUNNELED CV CATH W/O PORT OR PUMP: ICD-10-PCS | Mod: HCNC,RT,, | Performed by: SURGERY

## 2020-02-03 PROCEDURE — 63600175 PHARM REV CODE 636 W HCPCS: Mod: HCNC | Performed by: NURSE ANESTHETIST, CERTIFIED REGISTERED

## 2020-02-03 PROCEDURE — D9220A PRA ANESTHESIA: ICD-10-PCS | Mod: HCNC,,, | Performed by: NURSE ANESTHETIST, CERTIFIED REGISTERED

## 2020-02-03 PROCEDURE — 36558 INSERT TUNNELED CV CATH: CPT | Mod: HCNC,RT,, | Performed by: SURGERY

## 2020-02-03 PROCEDURE — S0020 INJECTION, BUPIVICAINE HYDRO: HCPCS | Mod: HCNC | Performed by: SURGERY

## 2020-02-03 PROCEDURE — 25000003 PHARM REV CODE 250: Mod: HCNC | Performed by: NURSE ANESTHETIST, CERTIFIED REGISTERED

## 2020-02-03 PROCEDURE — 37000009 HC ANESTHESIA EA ADD 15 MINS: Mod: HCNC | Performed by: SURGERY

## 2020-02-03 PROCEDURE — 37000008 HC ANESTHESIA 1ST 15 MINUTES: Mod: HCNC | Performed by: SURGERY

## 2020-02-03 DEVICE — CATH HEMOSPLIT 14.5FR 19CM: Type: IMPLANTABLE DEVICE | Site: CHEST  WALL | Status: FUNCTIONAL

## 2020-02-03 RX ORDER — HYDROCODONE BITARTRATE AND ACETAMINOPHEN 5; 325 MG/1; MG/1
1 TABLET ORAL EVERY 6 HOURS PRN
Qty: 20 TABLET | Refills: 0 | Status: SHIPPED | OUTPATIENT
Start: 2020-02-03 | End: 2020-02-10

## 2020-02-03 RX ORDER — LIDOCAINE HYDROCHLORIDE 20 MG/ML
INJECTION INTRAVENOUS
Status: DISCONTINUED | OUTPATIENT
Start: 2020-02-03 | End: 2020-02-03

## 2020-02-03 RX ORDER — FENTANYL CITRATE 50 UG/ML
INJECTION, SOLUTION INTRAMUSCULAR; INTRAVENOUS
Status: DISCONTINUED | OUTPATIENT
Start: 2020-02-03 | End: 2020-02-03

## 2020-02-03 RX ORDER — SODIUM CHLORIDE 9 MG/ML
INJECTION, SOLUTION INTRAVENOUS CONTINUOUS
Status: DISCONTINUED | OUTPATIENT
Start: 2020-02-03 | End: 2020-02-03 | Stop reason: HOSPADM

## 2020-02-03 RX ORDER — HEPARIN 100 UNIT/ML
SYRINGE INTRAVENOUS
Status: DISCONTINUED | OUTPATIENT
Start: 2020-02-03 | End: 2020-02-03 | Stop reason: HOSPADM

## 2020-02-03 RX ORDER — MEPERIDINE HYDROCHLORIDE 50 MG/ML
12.5 INJECTION INTRAMUSCULAR; INTRAVENOUS; SUBCUTANEOUS ONCE AS NEEDED
Status: DISCONTINUED | OUTPATIENT
Start: 2020-02-03 | End: 2020-02-03 | Stop reason: HOSPADM

## 2020-02-03 RX ORDER — KETAMINE HCL IN 0.9 % NACL 50 MG/5 ML
SYRINGE (ML) INTRAVENOUS
Status: DISCONTINUED | OUTPATIENT
Start: 2020-02-03 | End: 2020-02-03

## 2020-02-03 RX ORDER — PROPOFOL 10 MG/ML
VIAL (ML) INTRAVENOUS
Status: DISCONTINUED | OUTPATIENT
Start: 2020-02-03 | End: 2020-02-03

## 2020-02-03 RX ORDER — ONDANSETRON 2 MG/ML
4 INJECTION INTRAMUSCULAR; INTRAVENOUS ONCE AS NEEDED
Status: DISCONTINUED | OUTPATIENT
Start: 2020-02-03 | End: 2020-02-03 | Stop reason: HOSPADM

## 2020-02-03 RX ORDER — BUPIVACAINE HYDROCHLORIDE 5 MG/ML
INJECTION, SOLUTION EPIDURAL; INTRACAUDAL
Status: DISCONTINUED | OUTPATIENT
Start: 2020-02-03 | End: 2020-02-03 | Stop reason: HOSPADM

## 2020-02-03 RX ORDER — DIPHENHYDRAMINE HYDROCHLORIDE 50 MG/ML
25 INJECTION INTRAMUSCULAR; INTRAVENOUS EVERY 6 HOURS PRN
Status: DISCONTINUED | OUTPATIENT
Start: 2020-02-03 | End: 2020-02-03 | Stop reason: HOSPADM

## 2020-02-03 RX ORDER — PROPOFOL 10 MG/ML
VIAL (ML) INTRAVENOUS CONTINUOUS PRN
Status: DISCONTINUED | OUTPATIENT
Start: 2020-02-03 | End: 2020-02-03

## 2020-02-03 RX ORDER — CEFAZOLIN SODIUM 1 G/3ML
2 INJECTION, POWDER, FOR SOLUTION INTRAMUSCULAR; INTRAVENOUS
Status: COMPLETED | OUTPATIENT
Start: 2020-02-03 | End: 2020-02-03

## 2020-02-03 RX ORDER — FENTANYL CITRATE 50 UG/ML
25 INJECTION, SOLUTION INTRAMUSCULAR; INTRAVENOUS EVERY 5 MIN PRN
Status: DISCONTINUED | OUTPATIENT
Start: 2020-02-03 | End: 2020-02-03 | Stop reason: HOSPADM

## 2020-02-03 RX ORDER — LIDOCAINE HYDROCHLORIDE 10 MG/ML
1 INJECTION INFILTRATION; PERINEURAL ONCE
Status: COMPLETED | OUTPATIENT
Start: 2020-02-03 | End: 2020-02-03

## 2020-02-03 RX ORDER — ONDANSETRON 2 MG/ML
INJECTION INTRAMUSCULAR; INTRAVENOUS
Status: DISCONTINUED | OUTPATIENT
Start: 2020-02-03 | End: 2020-02-03

## 2020-02-03 RX ORDER — HYDROMORPHONE HYDROCHLORIDE 1 MG/ML
0.2 INJECTION, SOLUTION INTRAMUSCULAR; INTRAVENOUS; SUBCUTANEOUS EVERY 5 MIN PRN
Status: DISCONTINUED | OUTPATIENT
Start: 2020-02-03 | End: 2020-02-03 | Stop reason: HOSPADM

## 2020-02-03 RX ADMIN — FENTANYL CITRATE 25 MCG: 50 INJECTION, SOLUTION INTRAMUSCULAR; INTRAVENOUS at 08:02

## 2020-02-03 RX ADMIN — LIDOCAINE HYDROCHLORIDE 0.1 ML: 10 INJECTION, SOLUTION EPIDURAL; INFILTRATION; INTRACAUDAL; PERINEURAL at 07:02

## 2020-02-03 RX ADMIN — LIDOCAINE HYDROCHLORIDE 80 MG: 20 INJECTION, SOLUTION INTRAVENOUS at 08:02

## 2020-02-03 RX ADMIN — PROPOFOL 10 MG: 10 INJECTION, EMULSION INTRAVENOUS at 08:02

## 2020-02-03 RX ADMIN — ONDANSETRON 4 MG: 2 INJECTION INTRAMUSCULAR; INTRAVENOUS at 08:02

## 2020-02-03 RX ADMIN — PROPOFOL 40 MG: 10 INJECTION, EMULSION INTRAVENOUS at 08:02

## 2020-02-03 RX ADMIN — CEFAZOLIN 2 G: 330 INJECTION, POWDER, FOR SOLUTION INTRAMUSCULAR; INTRAVENOUS at 08:02

## 2020-02-03 RX ADMIN — PROPOFOL 10 MG: 10 INJECTION, EMULSION INTRAVENOUS at 09:02

## 2020-02-03 RX ADMIN — SODIUM CHLORIDE: 0.9 INJECTION, SOLUTION INTRAVENOUS at 07:02

## 2020-02-03 RX ADMIN — PROPOFOL 75 MCG/KG/MIN: 10 INJECTION, EMULSION INTRAVENOUS at 08:02

## 2020-02-03 RX ADMIN — Medication 10 MG: at 09:02

## 2020-02-03 RX ADMIN — Medication 20 MG: at 08:02

## 2020-02-03 RX ADMIN — FILGRASTIM 780 MCG: 480 INJECTION, SOLUTION INTRAVENOUS; SUBCUTANEOUS at 11:02

## 2020-02-03 NOTE — TELEPHONE ENCOUNTER
Phoned patient.  Informed her that she does not need to come in for Mozobil  This evening.  We discussed line care (keep dry and dressing intact).  She verbalized understanding.  Informed her to come to the 5th floor infusion center for 8am to begin stem cell collection.   ZOE Mehta RN, BMTCN

## 2020-02-03 NOTE — TRANSFER OF CARE
"Anesthesia Transfer of Care Note    Patient: Shelby Thompson    Procedure(s) Performed: Procedure(s) (LRB):  Insertion,catheter,tunneled double lumen right poss left (Right)    Patient location: [facility]    Anesthesia Type: general    Transport from OR: Transported from OR on 6-10 L/min O2 by face mask with adequate spontaneous ventilation    Post pain: adequate analgesia    Post assessment: no apparent anesthetic complications and tolerated procedure well    Post vital signs: stable    Level of consciousness: awake    Nausea/Vomiting: no nausea/vomiting    Complications: none    Transfer of care protocol was followed      Last vitals:   Visit Vitals  /76 (BP Location: Right arm, Patient Position: Lying)   Pulse 86   Temp 36.8 °C (98.3 °F) (Oral)   Resp 16   Ht 5' 3" (1.6 m)   Wt 72.2 kg (159 lb 2.8 oz)   LMP  (LMP Unknown)   SpO2 (!) 94%   Breastfeeding? No   BMI 28.20 kg/m²     "

## 2020-02-03 NOTE — DISCHARGE SUMMARY
[facility]  General Surgery  Discharge Summary      Patient Name: Shelby Thompson  MRN: [id number]  Admission Date: 2/3/2020  Hospital Length of Stay: 0 days  Discharge Date and Time:  02/03/2020 9:25 AM  Attending Physician: Milo Jasso MD   Discharging Provider: Hugo Dwyer MD  Primary Care Provider: Emanuel Arevalo MD     HPI: Patient is a 69 y.o. female with Hx HTN, HLD, ICH, MDD, PAD, breast cancer, AAA, stroke, and multiple myeloma. Patient is a stem cell transplant candidate and required durable central venous access. Here today for Permacath placement.     Currently takin ASA 81 daily. Held pre-procedure. No other antiplatelet or anticoagulant agents.  No prior central venous lines.    Procedure(s) (LRB):  Insertion,catheter,tunneled double lumen right poss left (Right)     Hospital Course: The patient was admitted and underwent the above listed procedures without apparent complication. Patient discharged home in stable condition.       Consults:     Significant Diagnostic Studies: Labs: All labs within the past 24 hours have been reviewed    Pending Diagnostic Studies:     Procedure Component Value Units Date/Time    X-Ray Chest 1 View [[id number]]     Order Status:  Sent Lab Status:  No result         Final Active Diagnoses:    Diagnosis Date Noted POA    Multiple myeloma [C90.00] 10/03/2019 Yes      Problems Resolved During this Admission:      Discharged Condition: good    Disposition: Home or Self Care    Follow Up:  Follow-up Information     Call Milo Jasso MD.    Specialty:  General Surgery  Why:  As needed  Contact information:  [address] [place] [id number] [phone]                 Patient Instructions:      Diet Adult Regular     Notify your health care provider if you experience any of the following:  redness, tenderness, or signs of infection (pain, swelling, redness, odor or green/yellow discharge around incision site)     Notify your health care  provider if you experience any of the following:  severe uncontrolled pain     Notify your health care provider if you experience any of the following:  persistent nausea and vomiting or diarrhea     Notify your health care provider if you experience any of the following:  temperature >100.4     Change dressing (specify)   Order Comments: Dressing change per infusion nurse     Activity as tolerated     Medications:  Reconciled Home Medications:      Medication List      START taking these medications    HYDROcodone-acetaminophen 5-325 mg per tablet  Commonly known as:  NORCO  Take 1 tablet by mouth every 6 (six) hours as needed for Pain.        CONTINUE taking these medications    acetaminophen 650 MG Tbsr  Commonly known as:  TYLENOL  Take 1,300 mg by mouth daily as needed (pain).     acyclovir 400 MG tablet  Commonly known as:  ZOVIRAX  Take 1 tablet (400 mg total) by mouth 2 (two) times daily.     amLODIPine 5 MG tablet  Commonly known as:  NORVASC  TAKE 1 TABLET BY MOUTH EVERY DAY     aspirin 81 mg Tab  Take 1 tablet by mouth Daily.     bisacodyL 5 mg EC tablet  Commonly known as:  DULCOLAX  Take 5 mg by mouth once daily.     ferrous gluconate 324 mg (37.5 mg iron) Tab tablet  TAKE 1 TABLET BY MOUTH 2 (TWO) TIMES DAILY WITH MEALS.     gabapentin 300 MG capsule  Commonly known as:  NEURONTIN  TAKE 1 CAPSULE (300 MG TOTAL) BY MOUTH 2 (TWO) TIMES DAILY.     GaviLyte-G 236-22.74-6.74 -5.86 gram suspension  Generic drug:  polyethylene glycol     loratadine 10 mg tablet  Commonly known as:  CLARITIN  Take 10 mg by mouth nightly.     omeprazole 40 MG capsule  Commonly known as:  PRILOSEC  TAKE 1 CAPSULE BY MOUTH EVERY DAY     ondansetron 4 MG tablet  Commonly known as:  ZOFRAN  Take 1 tablet (4 mg total) by mouth every 6 (six) hours as needed for Nausea.     ondansetron 8 MG Tbdl  Commonly known as:  ZOFRAN-ODT  Take 1 tablet (8 mg total) by mouth every 8 (eight) hours as needed (nausea/vomiting).     Praluent Pen 75  mg/mL Pnij  Generic drug:  alirocumab  Inject 1 mL (75 mg total) into the skin every 14 (fourteen) days.     Revlimid 25 mg Cap  Generic drug:  lenalidomide  TAKE 1 CAPSULE BY MOUTH EVERY DAY     sertraline 50 MG tablet  Commonly known as:  ZOLOFT  TAKE 1 TABLET (50 MG TOTAL) BY MOUTH ONCE DAILY.     traMADol 50 mg tablet  Commonly known as:  ULTRAM  Take 1 tablet (50 mg total) by mouth 2 (two) times daily as needed for Pain.     vitamin D 1000 units Tab  Commonly known as:  VITAMIN D3  Take 185 mg by mouth once daily.        ASK your doctor about these medications    allopurinoL 300 MG tablet  Commonly known as:  ZYLOPRIM  Take 1 tablet (300 mg total) by mouth once daily.            Hugo Dwyer MD  General Surgery  [facility]

## 2020-02-03 NOTE — ANESTHESIA PREPROCEDURE EVALUATION
02/03/2020  Shelby Thompson is a 69 y.o., female.    Anesthesia Evaluation         Review of Systems  Anesthesia Hx:  No problems with previous Anesthesia   Social:  Non-Smoker    Hematology/Oncology:         -- Cancer in past history:   Cardiovascular:   Exercise tolerance: good Hypertension Denies CAD.     Denies Angina.  Functional Capacity Can you climb two flights of stairs? ==> Yes    Pulmonary:   Denies Asthma.  Denies Recent URI.  Denies Sleep Apnea.    Renal/:  Renal/ Normal     Hepatic/GI:   Denies PUD. Denies Hiatal Hernia. GERD Denies Liver Disease.  Denies Hepatitis.    Neurological:   CVA Denies Seizures.    Endocrine:   Denies Diabetes. Denies Hypothyroidism.        Physical Exam  General:  Well nourished    Airway/Jaw/Neck:  Airway Findings: Mouth Opening: Normal Tongue: Normal  General Airway Assessment: Adult  Mallampati: III  Improves to II with phonation.  TM Distance: Normal, at least 6 cm  Jaw/Neck Findings:  Neck ROM: Normal ROM  Neck Findings:     Eyes/Ears/Nose:  EYES/EARS/NOSE FINDINGS: Normal   Dental:  Dental Findings: Edentulous   Chest/Lungs:  Chest/Lungs Findings: Clear to auscultation     Heart/Vascular:  Heart Findings: Rate: Normal  Rhythm: Regular Rhythm  Sounds: Normal        Mental Status:  Mental Status Findings:  Alert and Oriented         Anesthesia Plan  Type of Anesthesia, risks & benefits discussed:  Anesthesia Type:  general  Patient's Preference: Proceed with anesthesia understanding that the risks are very small but could be serious or life threatening.  Intra-op Monitoring Plan: standard ASA monitors  Intra-op Monitoring Plan Comments:   Post Op Pain Control Plan:   Post Op Pain Control Plan Comments:   Induction:   IV  Beta Blocker:  Patient is not currently on a Beta-Blocker (No further documentation required).       Informed Consent: Patient understands  risks and agrees with Anesthesia plan.  Questions answered. Anesthesia consent signed with patient.  ASA Score: 3     Day of Surgery Review of History & Physical: I have interviewed and examined the patient. I have reviewed the patient's H&P dated:            Ready For Surgery From Anesthesia Perspective.

## 2020-02-03 NOTE — TELEPHONE ENCOUNTER
Called and informed that pt is moving to transplant and revlimid is discontinued at this time. Might start later on maint. But will send new script then.

## 2020-02-03 NOTE — OP NOTE
[facility]  Surgery Department  Operative Note    SUMMARY     Date of Procedure: 2/3/2020     Procedure: Procedure(s) (LRB):  Insertion,catheter,tunneled double lumen right poss left (Right)     Surgeon(s) and Role:     * Milo Jasso MD - Primary    Assisting Surgeon: Hugo Dwyer MD - Resident    Pre-Operative Diagnosis: Multiple myeloma, remission status unspecified [C90.00]    Post-Operative Diagnosis: Post-Op Diagnosis Codes:     * Multiple myeloma, remission status unspecified [C90.00]    Anesthesia: Local MAC    Indications: Shelby Thompson is a 69 y.o. female with multiple myeloma. She was referred to us for permacath placement for initiation of bone marrow transplant. We offered permacath placement in the operating room. The patient was in agreement with the plan and did sign informed consent.     Procedure in detail: After informed consent was obtained, the patient was brought to the operating suite and placed in the supine position and prepped and draped in the usual sterile fashion. After undergoing adequate sedation, the ultrasound was used to assess her right internal jugular vein, which appeared to be a good target for placement of the Perm-A-Cath.  This was then cannulated under ultrasound guidance with an access needle, clearly visualizing the tip of the needle in the vessel. A guidewire was then threaded through into the SVC/RA under fluoroscopic-guidance. After confirming this with fluoro, the needle was removed. A tunnel was then made with the 18cm Perm-A-Cath with the cuff under the subcutaneous tissue and the catheter tunneled. At this time, the dilator sheath was then placed over the wire, removing the wire and dilator. The catheter was then placed through the sheath with ease and the sheath subsequently removed. This had been done under fluoroscopic guidance, and one final picture confirmed good placement of the Perm-A-Cath in good position. Both ports aspirated easily  and flushed easily with injectable saline. Both ports were then flushed with Heparin. The permacath was then secured to the skin using several; 2-0 Prolenes. The access site on the neck was closed with a single deep dermal monocryl stitch. Dermabond was applied. A biopatch was placed around the catheter exit site and sterile dressing applied. The patient was awaken from anesthesia and transported to the PACU in stable condition.   Chest x-ray is pending.   Dr. Jasso was present for the entire case.     Complications: No    Estimated Blood Loss (EBL): 5mL           Implants:   Implant Name Type Inv. Item Serial No.  Lot No. LRB No. Used   CATH HEMOSPLIT 14.5FR 19CM - [id number] Dialysis Catheter CATH HEMOSPLIT 14.5FR 19CM  [employer] [place] [id number] Right 1       Specimens:   Specimen (12h ago, onward)    None                  Condition: Good    Disposition: PACU - hemodynamically stable.

## 2020-02-03 NOTE — TELEPHONE ENCOUNTER
"----- Message from Rain Llanos sent at 2/3/2020 10:30 AM CST -----  Contact: [facility]   Pharmacy Assistant     Rx:  - REVLIMID 25 mg Cap    Name of caller: Davina  Provider/Physician?: Noman MICHAEL MD   Pharmacy name and phone number?: [facility]   What do they need to clarify?:  - Revlimid tabs, asking if they're still on hold   Contact Preference?: [phone] direct line     Additional Information:  "Thank you for all that you do for our patients'"    "

## 2020-02-03 NOTE — H&P
[facility]  General Surgery  History & Physical    Patient Name: Shelby Thompson  MRN: [id number]  Admission Date: 2/3/2020    Subjective:     Reason for Admission: Elective surgery    History of Present Illness:  Patient is a 69 y.o. female with Hx HTN, HLD, ICH, MDD, PAD, breast cancer, AAA, stroke, and multiple myeloma. Patient is a stem cell transplant candidate and required durable central venous access. Here today for Permacath placement.    Currently takin ASA 81 daily. Held pre-procedure. No other antiplatelet or anticoagulant agents.  No prior central venous lines.      No current facility-administered medications on file prior to encounter.      Current Outpatient Medications on File Prior to Encounter   Medication Sig    acetaminophen (TYLENOL) 650 MG TbSR Take 1,300 mg by mouth daily as needed (pain).    acyclovir (ZOVIRAX) 400 MG tablet Take 1 tablet (400 mg total) by mouth 2 (two) times daily.    amLODIPine (NORVASC) 5 MG tablet TAKE 1 TABLET BY MOUTH EVERY DAY    ferrous gluconate 324 mg (37.5 mg iron) Tab TAKE 1 TABLET BY MOUTH 2 (TWO) TIMES DAILY WITH MEALS.    gabapentin (NEURONTIN) 300 MG capsule TAKE 1 CAPSULE (300 MG TOTAL) BY MOUTH 2 (TWO) TIMES DAILY. (Patient taking differently: Take 300 mg by mouth 2 (two) times daily. )    loratadine (CLARITIN) 10 mg tablet Take 10 mg by mouth nightly.    omeprazole (PRILOSEC) 40 MG capsule TAKE 1 CAPSULE BY MOUTH EVERY DAY    sertraline (ZOLOFT) 50 MG tablet TAKE 1 TABLET (50 MG TOTAL) BY MOUTH ONCE DAILY.    vitamin D 1000 units Tab Take 185 mg by mouth once daily.    alirocumab (PRALUENT PEN) 75 mg/mL PnIj Inject 1 mL (75 mg total) into the skin every 14 (fourteen) days.    allopurinol (ZYLOPRIM) 300 MG tablet Take 1 tablet (300 mg total) by mouth once daily. (Patient not taking: Reported on 1/6/2020)    aspirin 81 mg Tab Take 1 tablet by mouth Daily.    bisacodyl (DULCOLAX) 5 mg EC tablet Take 5 mg by mouth once daily.     ondansetron (ZOFRAN) 4 MG tablet Take 1 tablet (4 mg total) by mouth every 6 (six) hours as needed for Nausea.    ondansetron (ZOFRAN-ODT) 8 MG TbDL Take 1 tablet (8 mg total) by mouth every 8 (eight) hours as needed (nausea/vomiting).    REVLIMID 25 mg Cap TAKE 1 CAPSULE BY MOUTH EVERY DAY    traMADol (ULTRAM) 50 mg tablet Take 1 tablet (50 mg total) by mouth 2 (two) times daily as needed for Pain.     Review of patient's allergies indicates:   Allergen Reactions    Lipitor [atorvastatin]      Itching, elevated cpk, muscle aches, statin induced myositis     Past Medical History:   Diagnosis Date    Acute respiratory failure with hypoxia 4/30/2019    FUENTES (acute kidney injury) 5/1/2019    Allergy     Anemia     Aortic aneurysm     Breast cancer 10/2011    left breast Stage 0 DCIS    Chronic diastolic congestive heart failure 11/6/2015    Colon polyp     GERD (gastroesophageal reflux disease)     Hiatal hernia     History of colonic polyps     Hx of psychiatric care     Zoloft    HX: breast cancer     Hyperlipemia     Hypertension     ICH (intracerebral hemorrhage)     Major depressive disorder, single episode, mild 6/23/2016    Nuclear sclerosis 7/21/2014    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 7/21/2014    PAD (peripheral artery disease) 11/6/2015    Psychiatric problem     Statin-induced rhabdomyolysis     4/2015     Stroke 4/2011    Walker as ambulation aid      Past Surgical History:   Procedure Laterality Date    APPENDECTOMY      BONE MARROW BIOPSY Left 10/3/2019    Procedure: Biopsy-bone marrow;  Surgeon: Jere Tineo MD;  Location: [facility] OR 2ND FLR;  Service: Oncology;  Laterality: Left;    BREAST BIOPSY Left 10/2011    BREAST LUMPECTOMY Left 2011    DCIS    COLONOSCOPY      COLONOSCOPY N/A 1/9/2020    Procedure: COLONOSCOPY;  Surgeon: Quang De La Cruz MD;  Location: [facility] (4TH FLR);  Service: Endoscopy;  Laterality: N/A;     CYST REMOVAL      back    ESOPHAGOGASTRODUODENOSCOPY  2016    duodenal angioectasia    ESOPHAGOGASTRODUODENOSCOPY N/A 2020    Procedure: EGD (ESOPHAGOGASTRODUODENOSCOPY);  Surgeon: Quang De La Cruz MD;  Location: [facility] ([address] [place] [address]);  Service: Endoscopy;  Laterality: N/A;    FOOT FRACTURE SURGERY  10/2012    right foot, with R hallux valgus repair    FRACTURE SURGERY Right     broken toe repiar    HEMORRHOID SURGERY      LIVER BIOPSY  2015    essentially normal, no fibrosis    TUBAL LIGATION      UPPER GASTROINTESTINAL ENDOSCOPY       Family History     Problem Relation (Age of Onset)    Breast cancer Sister (62)    Cancer Sister (63), Father    Fibroids Daughter    Hypertension Daughter, Son, Maternal Grandmother    No Known Problems Sister, Mother, Brother, Brother, Maternal Aunt, Maternal Uncle, Paternal Aunt, Paternal Uncle, Maternal Grandfather, Paternal Grandmother, Paternal Grandfather        Tobacco Use    Smoking status: Former Smoker     Packs/day: 0.50     Years: 20.00     Pack years: 10.00     Types: Cigarettes     Last attempt to quit: 2011     Years since quittin.8    Smokeless tobacco: Never Used   Substance and Sexual Activity    Alcohol use: Not Currently     Comment: none since 2020, very rare wine     Drug use: No    Sexual activity: Yes     Partners: Male     Birth control/protection: Post-menopausal     Review of Systems   Constitutional: Negative for activity change, chills, fatigue and fever.   HENT: Negative for congestion, rhinorrhea and sore throat.    Eyes: Negative for visual disturbance.   Respiratory: Negative for cough, shortness of breath and wheezing.    Cardiovascular: Negative for chest pain, palpitations and leg swelling.   Gastrointestinal: Negative for abdominal distention, abdominal pain, constipation, diarrhea, nausea and vomiting.   Genitourinary: Negative for dysuria, frequency and hematuria.   Musculoskeletal: Negative for arthralgias  and myalgias.   Skin: Negative for color change, rash and wound.   Neurological: Negative for syncope, weakness and numbness.   Hematological: Does not bruise/bleed easily.   Psychiatric/Behavioral: Negative for behavioral problems and confusion.     Objective:     Vital Signs (Most Recent):  Temp: 98.3 °F (36.8 °C) (02/03/20 0753)  Pulse: 86 (02/03/20 0753)  Resp: 16 (02/03/20 0753)  BP: 128/76 (02/03/20 0753)  SpO2: (!) 94 % (02/03/20 0753)     Weight: 72.2 kg (159 lb 2.8 oz)  Body mass index is 28.2 kg/m².    Physical Exam   Constitutional: She is oriented to person, place, and time. She appears well-developed and well-nourished. No distress.   HENT:   Head: Normocephalic and atraumatic.   Eyes: EOM are normal.   Neck:   Full active ROM   Cardiovascular: Normal rate, regular rhythm and intact distal pulses.   Pulmonary/Chest: Effort normal. No respiratory distress. She has no wheezes.   Abdominal: Soft. She exhibits no distension. There is no tenderness.   Musculoskeletal: She exhibits no edema or deformity.   Neurological: She is alert and oriented to person, place, and time.   Skin: Skin is warm and dry. No rash noted. She is not diaphoretic. No erythema.   Psychiatric: She has a normal mood and affect. Her behavior is normal.   Nursing note and vitals reviewed.      Significant Labs:  CBC, CMP: Reviewed.    Significant Diagnostics:  None    Assessment/Plan:     68 y/o female with complex medical history as detailed above here for tunneled central venous catheter placement for stem cell transplant    - Plan for Permacath placement today  - Risks, benefits, alternatives to the procedure discussed with the patient who wishes to proceed  - All questions and concerns addressed  - Informed consent obtained      Joe Nguyen MD  Surgery Resident, PGY-V  Pager: [phone]  2/3/2020 8:05 AM

## 2020-02-03 NOTE — BRIEF OP NOTE
[facility]  Brief Operative Note    Surgery Date: 2/3/2020     Surgeon(s) and Role:     * Milo Jasso MD - Primary    Assisting Surgeon: Hugo Dwyer MD - Resident    Pre-op Diagnosis:  Multiple myeloma, remission status unspecified [C90.00]    Post-op Diagnosis:  Post-Op Diagnosis Codes:     * Multiple myeloma, remission status unspecified [C90.00]    Procedure(s) (LRB):  Insertion,catheter,tunneled double lumen right poss left (Right)    Anesthesia: Local MAC    Description of the findings of the procedure(s): RIJ Permacath placed with intra-op ultrasound and flouroscopy    Estimated Blood Loss: 5mL         Specimens:   Specimen (12h ago, onward)    None            Discharge Note    OUTCOME: Patient tolerated treatment/procedure well without complication and is now ready for discharge.    DISPOSITION: Home or Self Care    FINAL DIAGNOSIS:  <principal problem not specified>    FOLLOWUP: In clinic

## 2020-02-03 NOTE — ANESTHESIA POSTPROCEDURE EVALUATION
Anesthesia Post Evaluation    Patient: Shelby Thompson    Procedure(s) Performed: Procedure(s) (LRB):  Insertion,catheter,tunneled double lumen right poss left (Right)    Final Anesthesia Type: general    Patient location during evaluation: PACU  Patient participation: Yes- Able to Participate  Level of consciousness: awake  Post-procedure vital signs: reviewed and stable  Pain management: adequate  Airway patency: patent    PONV status at discharge: No PONV  Anesthetic complications: no      Cardiovascular status: blood pressure returned to baseline  Respiratory status: unassisted  Hydration status: euvolemic  Follow-up not needed.          Vitals Value Taken Time   /74 2/3/2020 10:30 AM   Temp 36.8 °C (98.3 °F) 2/3/2020  9:20 AM   Pulse 77 2/3/2020 10:32 AM   Resp 16 2/3/2020 10:15 AM   SpO2 94 % 2/3/2020 10:30 AM   Vitals shown include unvalidated device data.      No case tracking events are documented in the log.      Pain/Yana Score: Yana Score: 10 (2/3/2020  9:45 AM)

## 2020-02-04 ENCOUNTER — INFUSION (OUTPATIENT)
Dept: INFUSION THERAPY | Facility: HOSPITAL | Age: 70
End: 2020-02-04
Attending: INTERNAL MEDICINE
Payer: MEDICARE

## 2020-02-04 ENCOUNTER — HOSPITAL ENCOUNTER (OUTPATIENT)
Dept: TRANSFUSION MEDICINE | Facility: HOSPITAL | Age: 70
Discharge: HOME | End: 2020-02-04
Attending: INTERNAL MEDICINE
Payer: MEDICARE

## 2020-02-04 VITALS
RESPIRATION RATE: 18 BRPM | DIASTOLIC BLOOD PRESSURE: 80 MMHG | SYSTOLIC BLOOD PRESSURE: 129 MMHG | HEART RATE: 91 BPM | TEMPERATURE: 98 F

## 2020-02-04 VITALS
WEIGHT: 158 LBS | DIASTOLIC BLOOD PRESSURE: 81 MMHG | HEART RATE: 96 BPM | SYSTOLIC BLOOD PRESSURE: 144 MMHG | HEIGHT: 63 IN | BODY MASS INDEX: 28 KG/M2 | TEMPERATURE: 98 F

## 2020-02-04 DIAGNOSIS — E78.5 HYPERLIPEMIA: ICD-10-CM

## 2020-02-04 DIAGNOSIS — D50.0 IRON DEFICIENCY ANEMIA DUE TO CHRONIC BLOOD LOSS: ICD-10-CM

## 2020-02-04 DIAGNOSIS — R73.9 HYPERGLYCEMIA: ICD-10-CM

## 2020-02-04 DIAGNOSIS — D50.9 IRON DEFICIENCY ANEMIA: ICD-10-CM

## 2020-02-04 DIAGNOSIS — R16.0 LIVER MASS: ICD-10-CM

## 2020-02-04 DIAGNOSIS — D47.2 GAMMOPATHY: ICD-10-CM

## 2020-02-04 DIAGNOSIS — I82.0 BUDD-CHIARI SYNDROME: ICD-10-CM

## 2020-02-04 DIAGNOSIS — I71.20 THORACIC AORTIC ANEURYSM WITHOUT RUPTURE: ICD-10-CM

## 2020-02-04 DIAGNOSIS — H40.013 OPEN ANGLE WITH BORDERLINE FINDINGS AND LOW GLAUCOMA RISK IN BOTH EYES: ICD-10-CM

## 2020-02-04 DIAGNOSIS — I77.1 TORTUOUS AORTA: ICD-10-CM

## 2020-02-04 DIAGNOSIS — Z78.9 STATIN INTOLERANCE: ICD-10-CM

## 2020-02-04 DIAGNOSIS — I50.33 ACUTE ON CHRONIC DIASTOLIC HEART FAILURE: ICD-10-CM

## 2020-02-04 DIAGNOSIS — C90.00 METASTATIC MULTIPLE MYELOMA TO BONE: ICD-10-CM

## 2020-02-04 DIAGNOSIS — I10 ESSENTIAL HYPERTENSION: ICD-10-CM

## 2020-02-04 DIAGNOSIS — I77.819 ECTATIC AORTA: ICD-10-CM

## 2020-02-04 DIAGNOSIS — I69.322 DYSARTHRIA AS LATE EFFECT OF CEREBROVASCULAR ACCIDENT (CVA): ICD-10-CM

## 2020-02-04 DIAGNOSIS — M85.80 OSTEOPENIA: ICD-10-CM

## 2020-02-04 DIAGNOSIS — Z76.82 STEM CELL TRANSPLANT CANDIDATE: Primary | ICD-10-CM

## 2020-02-04 DIAGNOSIS — F33.42 RECURRENT MAJOR DEPRESSIVE DISORDER, IN FULL REMISSION: ICD-10-CM

## 2020-02-04 DIAGNOSIS — I69.30 HISTORY OF CVA WITH RESIDUAL DEFICIT: ICD-10-CM

## 2020-02-04 DIAGNOSIS — I70.0 AORTIC ATHEROSCLEROSIS: ICD-10-CM

## 2020-02-04 DIAGNOSIS — Z85.3 PERSONAL HISTORY OF MALIGNANT NEOPLASM OF BREAST: Primary | ICD-10-CM

## 2020-02-04 DIAGNOSIS — I49.3 PVC (PREMATURE VENTRICULAR CONTRACTION): ICD-10-CM

## 2020-02-04 DIAGNOSIS — R25.1 TREMOR: ICD-10-CM

## 2020-02-04 DIAGNOSIS — C90.00 MULTIPLE MYELOMA: ICD-10-CM

## 2020-02-04 DIAGNOSIS — J84.9 INTERSTITIAL LUNG DISEASE: ICD-10-CM

## 2020-02-04 DIAGNOSIS — Z76.82 STEM CELL TRANSPLANT CANDIDATE: ICD-10-CM

## 2020-02-04 DIAGNOSIS — C90.00 MULTIPLE MYELOMA NOT HAVING ACHIEVED REMISSION: ICD-10-CM

## 2020-02-04 DIAGNOSIS — K31.9 GASTROPATHY: ICD-10-CM

## 2020-02-04 DIAGNOSIS — R93.89 THICKENED ENDOMETRIUM: ICD-10-CM

## 2020-02-04 DIAGNOSIS — Z86.010 HISTORY OF COLON POLYPS: ICD-10-CM

## 2020-02-04 DIAGNOSIS — N17.9 AKI (ACUTE KIDNEY INJURY): ICD-10-CM

## 2020-02-04 DIAGNOSIS — R94.2 ABNORMAL PFT: ICD-10-CM

## 2020-02-04 DIAGNOSIS — D64.9 ACUTE ANEMIA: ICD-10-CM

## 2020-02-04 DIAGNOSIS — I27.9 PULMONARY HEART DISEASE: ICD-10-CM

## 2020-02-04 DIAGNOSIS — I73.9 PAD (PERIPHERAL ARTERY DISEASE): ICD-10-CM

## 2020-02-04 DIAGNOSIS — G56.90: ICD-10-CM

## 2020-02-04 DIAGNOSIS — R06.02 SHORTNESS OF BREATH ON EXERTION: ICD-10-CM

## 2020-02-04 DIAGNOSIS — H25.10 NUCLEAR SCLEROSIS: ICD-10-CM

## 2020-02-04 LAB
ALBUMIN SERPL BCP-MCNC: 3.2 G/DL (ref 3.5–5.2)
ALBUMIN SERPL BCP-MCNC: 3.5 G/DL (ref 3.5–5.2)
ALBUMIN SERPL BCP-MCNC: 3.8 G/DL (ref 3.5–5.2)
ALP SERPL-CCNC: 214 U/L (ref 55–135)
ALP SERPL-CCNC: 248 U/L (ref 55–135)
ALP SERPL-CCNC: 255 U/L (ref 55–135)
ALT SERPL W/O P-5'-P-CCNC: 15 U/L (ref 10–44)
ALT SERPL W/O P-5'-P-CCNC: 16 U/L (ref 10–44)
ALT SERPL W/O P-5'-P-CCNC: 17 U/L (ref 10–44)
ANION GAP SERPL CALC-SCNC: 11 MMOL/L (ref 8–16)
ANION GAP SERPL CALC-SCNC: 13 MMOL/L (ref 8–16)
ANION GAP SERPL CALC-SCNC: 13 MMOL/L (ref 8–16)
ANISOCYTOSIS BLD QL SMEAR: SLIGHT
AST SERPL-CCNC: 24 U/L (ref 10–40)
AST SERPL-CCNC: 27 U/L (ref 10–40)
AST SERPL-CCNC: 31 U/L (ref 10–40)
BASOPHILS # BLD AUTO: ABNORMAL K/UL (ref 0–0.2)
BASOPHILS # BLD AUTO: ABNORMAL K/UL (ref 0–0.2)
BASOPHILS NFR BLD: 0 % (ref 0–1.9)
BILIRUB SERPL-MCNC: 0.3 MG/DL (ref 0.1–1)
BUN SERPL-MCNC: 4 MG/DL (ref 8–23)
BUN SERPL-MCNC: 6 MG/DL (ref 8–23)
BUN SERPL-MCNC: 7 MG/DL (ref 8–23)
CA-I BLDV-SCNC: 0.86 MMOL/L (ref 1.06–1.42)
CA-I BLDV-SCNC: 0.9 MMOL/L (ref 1.06–1.42)
CA-I BLDV-SCNC: 1.09 MMOL/L (ref 1.06–1.42)
CALCIUM SERPL-MCNC: 8.4 MG/DL (ref 8.7–10.5)
CALCIUM SERPL-MCNC: 8.9 MG/DL (ref 8.7–10.5)
CALCIUM SERPL-MCNC: 9 MG/DL (ref 8.7–10.5)
CHLORIDE SERPL-SCNC: 102 MMOL/L (ref 95–110)
CHLORIDE SERPL-SCNC: 103 MMOL/L (ref 95–110)
CHLORIDE SERPL-SCNC: 104 MMOL/L (ref 95–110)
CO2 SERPL-SCNC: 29 MMOL/L (ref 23–29)
CO2 SERPL-SCNC: 33 MMOL/L (ref 23–29)
CO2 SERPL-SCNC: 36 MMOL/L (ref 23–29)
CREAT SERPL-MCNC: 0.8 MG/DL (ref 0.5–1.4)
CREAT SERPL-MCNC: 0.9 MG/DL (ref 0.5–1.4)
CREAT SERPL-MCNC: 1 MG/DL (ref 0.5–1.4)
DACRYOCYTES BLD QL SMEAR: ABNORMAL
DIFFERENTIAL METHOD: ABNORMAL
EOSINOPHIL # BLD AUTO: ABNORMAL K/UL (ref 0–0.5)
EOSINOPHIL # BLD AUTO: ABNORMAL K/UL (ref 0–0.5)
EOSINOPHIL NFR BLD: 0 % (ref 0–8)
EOSINOPHIL NFR BLD: 1 % (ref 0–8)
EOSINOPHIL NFR BLD: 1.5 % (ref 0–8)
ERYTHROCYTE [DISTWIDTH] IN BLOOD BY AUTOMATED COUNT: 21.4 % (ref 11.5–14.5)
ERYTHROCYTE [DISTWIDTH] IN BLOOD BY AUTOMATED COUNT: 21.4 % (ref 11.5–14.5)
ERYTHROCYTE [DISTWIDTH] IN BLOOD BY AUTOMATED COUNT: 21.7 % (ref 11.5–14.5)
EST. GFR  (AFRICAN AMERICAN): >60 ML/MIN/1.73 M^2
EST. GFR  (NON AFRICAN AMERICAN): 57.2 ML/MIN/1.73 M^2
EST. GFR  (NON AFRICAN AMERICAN): >60 ML/MIN/1.73 M^2
EST. GFR  (NON AFRICAN AMERICAN): >60 ML/MIN/1.73 M^2
GLUCOSE SERPL-MCNC: 101 MG/DL (ref 70–110)
GLUCOSE SERPL-MCNC: 116 MG/DL (ref 70–110)
GLUCOSE SERPL-MCNC: 121 MG/DL (ref 70–110)
HCT VFR BLD AUTO: 31.7 % (ref 37–48.5)
HCT VFR BLD AUTO: 35.3 % (ref 37–48.5)
HCT VFR BLD AUTO: 35.8 % (ref 37–48.5)
HGB BLD-MCNC: 10.2 G/DL (ref 12–16)
HGB BLD-MCNC: 10.8 G/DL (ref 12–16)
HGB BLD-MCNC: 9.2 G/DL (ref 12–16)
HYPOCHROMIA BLD QL SMEAR: ABNORMAL
IMM GRANULOCYTES # BLD AUTO: ABNORMAL K/UL (ref 0–0.04)
IMM GRANULOCYTES NFR BLD AUTO: ABNORMAL % (ref 0–0.5)
LYMPHOCYTES # BLD AUTO: ABNORMAL K/UL (ref 1–4.8)
LYMPHOCYTES # BLD AUTO: ABNORMAL K/UL (ref 1–4.8)
LYMPHOCYTES NFR BLD: 2 % (ref 18–48)
MAGNESIUM SERPL-MCNC: 1.4 MG/DL (ref 1.6–2.6)
MAGNESIUM SERPL-MCNC: 1.6 MG/DL (ref 1.6–2.6)
MAGNESIUM SERPL-MCNC: 1.6 MG/DL (ref 1.6–2.6)
MCH RBC QN AUTO: 24.3 PG (ref 27–31)
MCH RBC QN AUTO: 24.5 PG (ref 27–31)
MCH RBC QN AUTO: 24.7 PG (ref 27–31)
MCHC RBC AUTO-ENTMCNC: 28.9 G/DL (ref 32–36)
MCHC RBC AUTO-ENTMCNC: 29 G/DL (ref 32–36)
MCHC RBC AUTO-ENTMCNC: 30.2 G/DL (ref 32–36)
MCV RBC AUTO: 82 FL (ref 82–98)
MCV RBC AUTO: 84 FL (ref 82–98)
MCV RBC AUTO: 85 FL (ref 82–98)
METAMYELOCYTES NFR BLD MANUAL: 1.5 %
MONOCYTES # BLD AUTO: ABNORMAL K/UL (ref 0.3–1)
MONOCYTES # BLD AUTO: ABNORMAL K/UL (ref 0.3–1)
MONOCYTES NFR BLD: 1 % (ref 4–15)
MONOCYTES NFR BLD: 2 % (ref 4–15)
MONOCYTES NFR BLD: 2 % (ref 4–15)
MYELOCYTES NFR BLD MANUAL: 1 %
NEUTROPHILS NFR BLD: 81.5 % (ref 38–73)
NEUTROPHILS NFR BLD: 92 % (ref 38–73)
NEUTROPHILS NFR BLD: 93 % (ref 38–73)
NEUTS BAND NFR BLD MANUAL: 10.5 %
NEUTS BAND NFR BLD MANUAL: 3 %
NEUTS BAND NFR BLD MANUAL: 4 %
NRBC BLD-RTO: 0 /100 WBC
OVALOCYTES BLD QL SMEAR: ABNORMAL
PHOSPHATE SERPL-MCNC: 2.1 MG/DL (ref 2.7–4.5)
PHOSPHATE SERPL-MCNC: 2.5 MG/DL (ref 2.7–4.5)
PHOSPHATE SERPL-MCNC: 2.5 MG/DL (ref 2.7–4.5)
PLATELET # BLD AUTO: 133 K/UL (ref 150–350)
PLATELET # BLD AUTO: 203 K/UL (ref 150–350)
PLATELET # BLD AUTO: 99 K/UL (ref 150–350)
PLATELET BLD QL SMEAR: ABNORMAL
PLATELET BLD QL SMEAR: ABNORMAL
PMV BLD AUTO: 11 FL (ref 9.2–12.9)
PMV BLD AUTO: ABNORMAL FL (ref 9.2–12.9)
PMV BLD AUTO: ABNORMAL FL (ref 9.2–12.9)
POIKILOCYTOSIS BLD QL SMEAR: SLIGHT
POLYCHROMASIA BLD QL SMEAR: ABNORMAL
POTASSIUM SERPL-SCNC: 3 MMOL/L (ref 3.5–5.1)
POTASSIUM SERPL-SCNC: 3.4 MMOL/L (ref 3.5–5.1)
POTASSIUM SERPL-SCNC: 3.4 MMOL/L (ref 3.5–5.1)
PROT SERPL-MCNC: 6.1 G/DL (ref 6–8.4)
PROT SERPL-MCNC: 6.7 G/DL (ref 6–8.4)
PROT SERPL-MCNC: 7.2 G/DL (ref 6–8.4)
RBC # BLD AUTO: 3.75 M/UL (ref 4–5.4)
RBC # BLD AUTO: 4.19 M/UL (ref 4–5.4)
RBC # BLD AUTO: 4.37 M/UL (ref 4–5.4)
SCHISTOCYTES BLD QL SMEAR: PRESENT
SODIUM SERPL-SCNC: 144 MMOL/L (ref 136–145)
SODIUM SERPL-SCNC: 149 MMOL/L (ref 136–145)
SODIUM SERPL-SCNC: 151 MMOL/L (ref 136–145)
WBC # BLD AUTO: 54.38 K/UL (ref 3.9–12.7)
WBC # BLD AUTO: 60.13 K/UL (ref 3.9–12.7)
WBC # BLD AUTO: 64.95 K/UL (ref 3.9–12.7)

## 2020-02-04 PROCEDURE — 96372 THER/PROPH/DIAG INJ SC/IM: CPT | Mod: HCNC

## 2020-02-04 PROCEDURE — 82330 ASSAY OF CALCIUM: CPT | Mod: 91,HCNC

## 2020-02-04 PROCEDURE — 80053 COMPREHEN METABOLIC PANEL: CPT | Mod: 91,HCNC

## 2020-02-04 PROCEDURE — 85027 COMPLETE CBC AUTOMATED: CPT | Mod: 91,HCNC

## 2020-02-04 PROCEDURE — 63600175 PHARM REV CODE 636 W HCPCS: Mod: JG,HCNC | Performed by: NURSE PRACTITIONER

## 2020-02-04 PROCEDURE — 38206 HARVEST AUTO STEM CELLS: CPT | Mod: HCNC,,, | Performed by: PATHOLOGY

## 2020-02-04 PROCEDURE — 25000003 PHARM REV CODE 250: Mod: HCNC | Performed by: NURSE PRACTITIONER

## 2020-02-04 PROCEDURE — 25000003 PHARM REV CODE 250: Mod: HCNC | Performed by: INTERNAL MEDICINE

## 2020-02-04 PROCEDURE — 38214 VOLUME DEPLETE OF HARVEST: CPT | Mod: HCNC

## 2020-02-04 PROCEDURE — 84100 ASSAY OF PHOSPHORUS: CPT | Mod: 91,HCNC

## 2020-02-04 PROCEDURE — 83735 ASSAY OF MAGNESIUM: CPT | Mod: HCNC

## 2020-02-04 PROCEDURE — 38207 CRYOPRESERVE STEM CELLS: CPT | Mod: HCNC

## 2020-02-04 PROCEDURE — 85007 BL SMEAR W/DIFF WBC COUNT: CPT | Mod: 91,HCNC

## 2020-02-04 PROCEDURE — 63600175 PHARM REV CODE 636 W HCPCS: Mod: HCNC | Performed by: PATHOLOGY

## 2020-02-04 PROCEDURE — 38206 HARVEST AUTO STEM CELLS: CPT | Mod: HCNC

## 2020-02-04 PROCEDURE — 38206 PR PROG CELL HARVEST,TRANSPLANT,AUTOLOGOUS: ICD-10-PCS | Mod: HCNC,,, | Performed by: PATHOLOGY

## 2020-02-04 RX ORDER — SODIUM,POTASSIUM PHOSPHATES 280-250MG
2 POWDER IN PACKET (EA) ORAL ONCE
Status: COMPLETED | OUTPATIENT
Start: 2020-02-04 | End: 2020-02-04

## 2020-02-04 RX ORDER — POTASSIUM CHLORIDE 20 MEQ/1
40 TABLET, EXTENDED RELEASE ORAL ONCE
Status: COMPLETED | OUTPATIENT
Start: 2020-02-04 | End: 2020-02-04

## 2020-02-04 RX ORDER — HEPARIN SODIUM 1000 [USP'U]/ML
3100 INJECTION, SOLUTION INTRAVENOUS; SUBCUTANEOUS ONCE
Status: COMPLETED | OUTPATIENT
Start: 2020-02-04 | End: 2020-02-04

## 2020-02-04 RX ORDER — ACETAMINOPHEN 325 MG/1
650 TABLET ORAL ONCE AS NEEDED
Status: DISCONTINUED | OUTPATIENT
Start: 2020-02-04 | End: 2020-02-04 | Stop reason: HOSPADM

## 2020-02-04 RX ORDER — DIPHENHYDRAMINE HCL 25 MG
25 CAPSULE ORAL ONCE AS NEEDED
Status: DISCONTINUED | OUTPATIENT
Start: 2020-02-04 | End: 2020-02-04 | Stop reason: HOSPADM

## 2020-02-04 RX ORDER — LANOLIN ALCOHOL/MO/W.PET/CERES
800 CREAM (GRAM) TOPICAL ONCE AS NEEDED
Status: COMPLETED | OUTPATIENT
Start: 2020-02-04 | End: 2020-02-04

## 2020-02-04 RX ORDER — POTASSIUM CHLORIDE 20 MEQ/1
40 TABLET, EXTENDED RELEASE ORAL ONCE AS NEEDED
Status: COMPLETED | OUTPATIENT
Start: 2020-02-04 | End: 2020-02-04

## 2020-02-04 RX ORDER — SODIUM,POTASSIUM PHOSPHATES 280-250MG
2 POWDER IN PACKET (EA) ORAL ONCE AS NEEDED
Status: COMPLETED | OUTPATIENT
Start: 2020-02-04 | End: 2020-02-04

## 2020-02-04 RX ADMIN — POTASSIUM & SODIUM PHOSPHATES POWDER PACK 280-160-250 MG 2 PACKET: 280-160-250 PACK at 06:02

## 2020-02-04 RX ADMIN — HEPARIN SODIUM 3100 UNITS: 1000 INJECTION, SOLUTION INTRAVENOUS; SUBCUTANEOUS at 05:02

## 2020-02-04 RX ADMIN — POTASSIUM & SODIUM PHOSPHATES POWDER PACK 280-160-250 MG 2 PACKET: 280-160-250 PACK at 09:02

## 2020-02-04 RX ADMIN — POTASSIUM & SODIUM PHOSPHATES POWDER PACK 280-160-250 MG 2 PACKET: 280-160-250 PACK at 01:02

## 2020-02-04 RX ADMIN — POTASSIUM CHLORIDE 40 MEQ: 1500 TABLET, EXTENDED RELEASE ORAL at 06:02

## 2020-02-04 RX ADMIN — POTASSIUM CHLORIDE 40 MEQ: 1500 TABLET, EXTENDED RELEASE ORAL at 01:02

## 2020-02-04 RX ADMIN — POTASSIUM CHLORIDE 40 MEQ: 1500 TABLET, EXTENDED RELEASE ORAL at 09:02

## 2020-02-04 RX ADMIN — FILGRASTIM 780 MCG: 480 INJECTION, SOLUTION INTRAVENOUS; SUBCUTANEOUS at 08:02

## 2020-02-04 RX ADMIN — MAGNESIUM OXIDE TAB 400 MG (241.3 MG ELEMENTAL MG) 800 MG: 400 (241.3 MG) TAB at 06:02

## 2020-02-04 RX ADMIN — CALCIUM GLUCONATE 4000 MG: 94 INJECTION, SOLUTION INTRAVENOUS at 09:02

## 2020-02-04 NOTE — PROGRESS NOTES
Pt to Apheresis / Chemo Infusion this am for autologous stem cell collection with friend.  Pt A&O x4.  Voiced no pain. 0 on pain scale.  Timeout performed by 2 Apheresis nurses.  Apheresis RN accessed R perm cath without difficuly.  [facility] start time 0815. R perm cath patent.  Dressing CDI.   Meds 4 g Ca Glu IVPB per Apheresis RN.  Target goal achieved per Dr Multani. HPC stopped at 1658.   Cath flushed and locked per Apheresis RN.   Pt tolerated well.  Today concludes the collection.  Pt in collection room with friend awaiting post labs to be resulted, and to be discharged per Chemo RN.

## 2020-02-04 NOTE — PLAN OF CARE
Patient in clinic for Day 1 of stem cell collection. VS stable. Labs reviewed. Right chest vascath present - patient reports pain & tenderness to site from recent placement. Neupogen injection given per protocol. Will continue to monitor patient and labs. Will give electrolyte replacements as needed. Friend at bedside.

## 2020-02-04 NOTE — PROGRESS NOTES
Polyp was benign, however it  had the potential risk of turning into colon cancer if it were left in to the colon too long. Will require repeat colonoscopy in 5 Years.

## 2020-02-05 ENCOUNTER — TELEPHONE (OUTPATIENT)
Dept: GASTROENTEROLOGY | Facility: CLINIC | Age: 70
End: 2020-02-05

## 2020-02-05 NOTE — TELEPHONE ENCOUNTER
----- Message from Nicolasa Mao NP sent at 2/4/2020  4:53 PM CST -----  Polyp was benign, however it  had the potential risk of turning into colon cancer if it were left in to the colon too long. Will require repeat colonoscopy in 5 Years.

## 2020-02-05 NOTE — PLAN OF CARE
Patient completed stem cell collection without complications. Electrolytes replaced as ordered. Labs reviewed prior to discharge. Patient understands collection is complete and will not need to RTC tomorrow. Discharged home with friend.

## 2020-02-05 NOTE — PROCEDURES
[facility]  Transfusion Medicine  Procedure Note    SUMMARY   Procedures  Date of Procedure: 2/4/2020    Procedure: Hematopoietic Progenitor Cell Collection    Provider: Davina Grossman MD     Assisting Provider: None    Pre-Procedure Diagnosis: Multiple Myeloma    Post-Procedure Diagnosis: Multiple Myeloma    Follow-up Assessment: Ms. Thompson is undergoing peripheral blood stem cell collection in anticipation of an autologous transplant. This is day one of collection, and the mid run indicated that 3.06 X 10^6 CD34 cells per kg had been collected indicating that we could reach the goal of 5-10 x 10^6 CD 34 cells/kg by the end of the day.   Collection was then continued until approximately 1700. Final collection totals are:  TNC  11.83 x108/kg  CD34  5.70 x106/kg      Pertinent Laboratory Data:   Complete Blood Count:   Lab Results   Component Value Date    HGB 10.8 (L) 02/04/2020    HCT 35.8 (L) 02/04/2020    PLT 99 (L) 02/04/2020    WBC 64.95 (HH) 02/04/2020     Basic Metabolic Panel:   Lab Results   Component Value Date     (H) 02/04/2020    K 3.4 (L) 02/04/2020     02/04/2020    CO2 36 (H) 02/04/2020     (H) 02/04/2020    BUN 4 (L) 02/04/2020    CREATININE 0.9 02/04/2020    CALCIUM 9.0 02/04/2020    ANIONGAP 13 02/04/2020    ESTGFRAFRICA >60.0 02/04/2020    EGFRNONAA >60.0 02/04/2020     CD34 - quantitative:   Lab Results   Component Value Date    CD34 0.04 02/03/2020    [id number] 20.19 02/03/2020    [id number] 96.1 02/03/2020       Pertinent Medications: None contraindicated for collection. Mobilized with GCSF.    Review of patient's allergies indicates:   Allergen Reactions    Lipitor [atorvastatin]      Itching, elevated cpk, muscle aches, statin induced myositis       Anesthesia: None     Technical Procedures Used: Hematopoietic Progenitor Cell collection: The patient presented to the 5th floor Chemotherapy unit, details about the procedure reviewed. Any interim  clinical changes from previous clinic visit - No. All pertinent labs reviewed. Human Progenitor (Stem) Cell Collection initiated by Apheresis Nurse. Current plan is to perform the procedure for 8.5 h, including a midrun break hours. Initial laboratory tests collected, results to Oncology Nurse. Mid-run laboratory tests collected, results to Oncology Nurse. Included CD34 count on collection bag - results to Stem Cell Lab and BMT clinical team. Final laboratory tests collected, results to Oncology Nurse. Red blood cell or platelet transfusion - No.  Date of next procedure Collection is complete..    Description of the Findings of the Procedure:     Please see Apheresis Nurse flowsheet for details.    The patient was evaluated and all clinical and laboratory data relevant to the treatment was reviewed, and a decision was made to proceed with the Apheresis procedure.    I was available to the clinical staff throughout the procedure.    Significant Surgical Tasks Conducted by the Assistant(s): Not applicable    Complications: None    Estimated Blood Loss (EBL): None    Implants: None     Specimens: None

## 2020-02-10 ENCOUNTER — HOSPITAL ENCOUNTER (INPATIENT)
Facility: HOSPITAL | Age: 70
LOS: 16 days | Discharge: HOME | DRG: 016 | End: 2020-02-26
Attending: INTERNAL MEDICINE | Admitting: INTERNAL MEDICINE
Payer: MEDICARE

## 2020-02-10 ENCOUNTER — OFFICE VISIT (OUTPATIENT)
Dept: HEMATOLOGY/ONCOLOGY | Facility: CLINIC | Age: 70
DRG: 016 | End: 2020-02-10
Payer: MEDICARE

## 2020-02-10 ENCOUNTER — TELEPHONE (OUTPATIENT)
Dept: PHARMACY | Facility: CLINIC | Age: 70
End: 2020-02-10

## 2020-02-10 VITALS
SYSTOLIC BLOOD PRESSURE: 131 MMHG | DIASTOLIC BLOOD PRESSURE: 76 MMHG | TEMPERATURE: 98 F | WEIGHT: 164.88 LBS | HEART RATE: 70 BPM | OXYGEN SATURATION: 95 % | RESPIRATION RATE: 16 BRPM | BODY MASS INDEX: 29.21 KG/M2 | HEIGHT: 63 IN

## 2020-02-10 DIAGNOSIS — E11.9 TYPE 2 DIABETES MELLITUS WITHOUT COMPLICATION, WITHOUT LONG-TERM CURRENT USE OF INSULIN: ICD-10-CM

## 2020-02-10 DIAGNOSIS — Z85.3 PERSONAL HISTORY OF MALIGNANT NEOPLASM OF BREAST: ICD-10-CM

## 2020-02-10 DIAGNOSIS — C90.00 MULTIPLE MYELOMA: Primary | ICD-10-CM

## 2020-02-10 DIAGNOSIS — F32.A ANXIETY AND DEPRESSION: ICD-10-CM

## 2020-02-10 DIAGNOSIS — J84.9 INTERSTITIAL LUNG DISEASE: ICD-10-CM

## 2020-02-10 DIAGNOSIS — C90.00 MULTIPLE MYELOMA NOT HAVING ACHIEVED REMISSION: ICD-10-CM

## 2020-02-10 DIAGNOSIS — E78.00 PURE HYPERCHOLESTEROLEMIA: ICD-10-CM

## 2020-02-10 DIAGNOSIS — I10 ESSENTIAL HYPERTENSION: ICD-10-CM

## 2020-02-10 DIAGNOSIS — T45.1X5A CHEMOTHERAPY-INDUCED NAUSEA: ICD-10-CM

## 2020-02-10 DIAGNOSIS — R25.1 TREMOR: ICD-10-CM

## 2020-02-10 DIAGNOSIS — Z76.82 STEM CELL TRANSPLANT CANDIDATE: ICD-10-CM

## 2020-02-10 DIAGNOSIS — R11.0 CHEMOTHERAPY-INDUCED NAUSEA: ICD-10-CM

## 2020-02-10 DIAGNOSIS — I69.30 HISTORY OF CVA WITH RESIDUAL DEFICIT: ICD-10-CM

## 2020-02-10 DIAGNOSIS — F41.9 ANXIETY AND DEPRESSION: ICD-10-CM

## 2020-02-10 DIAGNOSIS — C90.01 MULTIPLE MYELOMA IN REMISSION: ICD-10-CM

## 2020-02-10 DIAGNOSIS — C90.01 MULTIPLE MYELOMA IN REMISSION: Primary | ICD-10-CM

## 2020-02-10 DIAGNOSIS — K76.9 HEPATIC LESION: ICD-10-CM

## 2020-02-10 DIAGNOSIS — D50.0 IRON DEFICIENCY ANEMIA DUE TO CHRONIC BLOOD LOSS: ICD-10-CM

## 2020-02-10 DIAGNOSIS — R19.7 DIARRHEA, UNSPECIFIED TYPE: ICD-10-CM

## 2020-02-10 DIAGNOSIS — Z94.81 STATUS POST AUTOLOGOUS BONE MARROW TRANSPLANT: ICD-10-CM

## 2020-02-10 DIAGNOSIS — R94.2 ABNORMAL PFT: ICD-10-CM

## 2020-02-10 DIAGNOSIS — Z78.9 STATIN INTOLERANCE: ICD-10-CM

## 2020-02-10 DIAGNOSIS — D61.818 PANCYTOPENIA: ICD-10-CM

## 2020-02-10 PROBLEM — G62.0 DRUG-INDUCED POLYNEUROPATHY: Status: ACTIVE | Noted: 2020-02-10

## 2020-02-10 PROCEDURE — 1126F AMNT PAIN NOTED NONE PRSNT: CPT | Mod: HCNC,S$GLB,, | Performed by: NURSE PRACTITIONER

## 2020-02-10 PROCEDURE — 1126F PR PAIN SEVERITY QUANTIFIED, NO PAIN PRESENT: ICD-10-PCS | Mod: HCNC,S$GLB,, | Performed by: NURSE PRACTITIONER

## 2020-02-10 PROCEDURE — 3078F DIAST BP <80 MM HG: CPT | Mod: HCNC,CPTII,S$GLB, | Performed by: NURSE PRACTITIONER

## 2020-02-10 PROCEDURE — 99999 PR PBB SHADOW E&M-EST. PATIENT-LVL III: CPT | Mod: PBBFAC,HCNC,, | Performed by: NURSE PRACTITIONER

## 2020-02-10 PROCEDURE — 63600175 PHARM REV CODE 636 W HCPCS: Mod: HCNC | Performed by: NURSE PRACTITIONER

## 2020-02-10 PROCEDURE — 20600001 HC STEP DOWN PRIVATE ROOM: Mod: HCNC

## 2020-02-10 PROCEDURE — 3051F PR MOST RECENT HEMOGLOBIN A1C LEVEL 7.0 - < 8.0%: ICD-10-PCS | Mod: HCNC,CPTII,S$GLB, | Performed by: NURSE PRACTITIONER

## 2020-02-10 PROCEDURE — 25000003 PHARM REV CODE 250: Mod: HCNC | Performed by: NURSE PRACTITIONER

## 2020-02-10 PROCEDURE — 99215 OFFICE O/P EST HI 40 MIN: CPT | Mod: HCNC,S$GLB,, | Performed by: NURSE PRACTITIONER

## 2020-02-10 PROCEDURE — 1159F MED LIST DOCD IN RCRD: CPT | Mod: HCNC,S$GLB,, | Performed by: NURSE PRACTITIONER

## 2020-02-10 PROCEDURE — 3051F HG A1C>EQUAL 7.0%<8.0%: CPT | Mod: HCNC,CPTII,S$GLB, | Performed by: NURSE PRACTITIONER

## 2020-02-10 PROCEDURE — 63600175 PHARM REV CODE 636 W HCPCS: Mod: HCNC | Performed by: INTERNAL MEDICINE

## 2020-02-10 PROCEDURE — 94799 UNLISTED PULMONARY SVC/PX: CPT | Mod: HCNC

## 2020-02-10 PROCEDURE — 3075F PR MOST RECENT SYSTOLIC BLOOD PRESS GE 130-139MM HG: ICD-10-PCS | Mod: HCNC,CPTII,S$GLB, | Performed by: NURSE PRACTITIONER

## 2020-02-10 PROCEDURE — 99215 PR OFFICE/OUTPT VISIT, EST, LEVL V, 40-54 MIN: ICD-10-PCS | Mod: HCNC,S$GLB,, | Performed by: NURSE PRACTITIONER

## 2020-02-10 PROCEDURE — 1159F PR MEDICATION LIST DOCUMENTED IN MEDICAL RECORD: ICD-10-PCS | Mod: HCNC,S$GLB,, | Performed by: NURSE PRACTITIONER

## 2020-02-10 PROCEDURE — 1101F PT FALLS ASSESS-DOCD LE1/YR: CPT | Mod: HCNC,CPTII,S$GLB, | Performed by: NURSE PRACTITIONER

## 2020-02-10 PROCEDURE — 1101F PR PT FALLS ASSESS DOC 0-1 FALLS W/OUT INJ PAST YR: ICD-10-PCS | Mod: HCNC,CPTII,S$GLB, | Performed by: NURSE PRACTITIONER

## 2020-02-10 PROCEDURE — 3075F SYST BP GE 130 - 139MM HG: CPT | Mod: HCNC,CPTII,S$GLB, | Performed by: NURSE PRACTITIONER

## 2020-02-10 PROCEDURE — 99233 PR SUBSEQUENT HOSPITAL CARE,LEVL III: ICD-10-PCS | Mod: HCNC,,, | Performed by: INTERNAL MEDICINE

## 2020-02-10 PROCEDURE — 99499 RISK ADDL DX/OHS AUDIT: ICD-10-PCS | Mod: HCNC,S$GLB,, | Performed by: NURSE PRACTITIONER

## 2020-02-10 PROCEDURE — 3078F PR MOST RECENT DIASTOLIC BLOOD PRESSURE < 80 MM HG: ICD-10-PCS | Mod: HCNC,CPTII,S$GLB, | Performed by: NURSE PRACTITIONER

## 2020-02-10 PROCEDURE — 99999 PR PBB SHADOW E&M-EST. PATIENT-LVL III: ICD-10-PCS | Mod: PBBFAC,HCNC,, | Performed by: NURSE PRACTITIONER

## 2020-02-10 PROCEDURE — 99499 UNLISTED E&M SERVICE: CPT | Mod: HCNC,S$GLB,, | Performed by: NURSE PRACTITIONER

## 2020-02-10 PROCEDURE — 99233 SBSQ HOSP IP/OBS HIGH 50: CPT | Mod: HCNC,,, | Performed by: INTERNAL MEDICINE

## 2020-02-10 RX ORDER — LANOLIN ALCOHOL/MO/W.PET/CERES
400 CREAM (GRAM) TOPICAL EVERY 4 HOURS PRN
Status: DISCONTINUED | OUTPATIENT
Start: 2020-02-10 | End: 2020-02-26 | Stop reason: HOSPADM

## 2020-02-10 RX ORDER — LEVOFLOXACIN 500 MG/1
500 TABLET, FILM COATED ORAL DAILY
Status: DISCONTINUED | OUTPATIENT
Start: 2020-02-11 | End: 2020-02-26

## 2020-02-10 RX ORDER — DIPHENHYDRAMINE HYDROCHLORIDE 50 MG/ML
50 INJECTION INTRAMUSCULAR; INTRAVENOUS ONCE
Status: COMPLETED | OUTPATIENT
Start: 2020-02-12 | End: 2020-02-12

## 2020-02-10 RX ORDER — PANTOPRAZOLE SODIUM 40 MG/1
40 TABLET, DELAYED RELEASE ORAL DAILY
Status: DISCONTINUED | OUTPATIENT
Start: 2020-02-11 | End: 2020-02-26 | Stop reason: HOSPADM

## 2020-02-10 RX ORDER — SODIUM,POTASSIUM PHOSPHATES 280-250MG
1 POWDER IN PACKET (EA) ORAL EVERY 4 HOURS PRN
Status: DISCONTINUED | OUTPATIENT
Start: 2020-02-10 | End: 2020-02-26 | Stop reason: HOSPADM

## 2020-02-10 RX ORDER — LORAZEPAM 2 MG/ML
1 INJECTION INTRAMUSCULAR ONCE
Status: COMPLETED | OUTPATIENT
Start: 2020-02-12 | End: 2020-02-12

## 2020-02-10 RX ORDER — LANOLIN ALCOHOL/MO/W.PET/CERES
800 CREAM (GRAM) TOPICAL EVERY 4 HOURS PRN
Status: DISCONTINUED | OUTPATIENT
Start: 2020-02-10 | End: 2020-02-26 | Stop reason: HOSPADM

## 2020-02-10 RX ORDER — PROCHLORPERAZINE EDISYLATE 5 MG/ML
10 INJECTION INTRAMUSCULAR; INTRAVENOUS EVERY 6 HOURS PRN
Status: DISCONTINUED | OUTPATIENT
Start: 2020-02-10 | End: 2020-02-26 | Stop reason: HOSPADM

## 2020-02-10 RX ORDER — ENOXAPARIN SODIUM 100 MG/ML
40 INJECTION SUBCUTANEOUS EVERY 24 HOURS
Status: DISCONTINUED | OUTPATIENT
Start: 2020-02-10 | End: 2020-02-21

## 2020-02-10 RX ORDER — DIPHENHYDRAMINE HYDROCHLORIDE 50 MG/ML
50 INJECTION INTRAMUSCULAR; INTRAVENOUS ONCE AS NEEDED
Status: COMPLETED | OUTPATIENT
Start: 2020-02-12 | End: 2020-02-12

## 2020-02-10 RX ORDER — MAG HYDROX/ALUMINUM HYD/SIMETH 200-200-20
30 SUSPENSION, ORAL (FINAL DOSE FORM) ORAL EVERY 6 HOURS PRN
Status: DISCONTINUED | OUTPATIENT
Start: 2020-02-10 | End: 2020-02-26 | Stop reason: HOSPADM

## 2020-02-10 RX ORDER — HYDROCODONE BITARTRATE AND ACETAMINOPHEN 500; 5 MG/1; MG/1
TABLET ORAL
Status: DISCONTINUED | OUTPATIENT
Start: 2020-02-12 | End: 2020-02-13

## 2020-02-10 RX ORDER — HYDROCODONE BITARTRATE AND ACETAMINOPHEN 500; 5 MG/1; MG/1
TABLET ORAL
Status: DISCONTINUED | OUTPATIENT
Start: 2020-02-12 | End: 2020-02-17

## 2020-02-10 RX ORDER — POTASSIUM CHLORIDE 20 MEQ/1
20 TABLET, EXTENDED RELEASE ORAL
Status: DISCONTINUED | OUTPATIENT
Start: 2020-02-10 | End: 2020-02-26 | Stop reason: HOSPADM

## 2020-02-10 RX ORDER — DEXAMETHASONE 4 MG/1
12 TABLET ORAL
Status: COMPLETED | OUTPATIENT
Start: 2020-02-11 | End: 2020-02-11

## 2020-02-10 RX ORDER — ACYCLOVIR 200 MG/1
800 CAPSULE ORAL 2 TIMES DAILY
Status: DISCONTINUED | OUTPATIENT
Start: 2020-02-11 | End: 2020-02-26 | Stop reason: HOSPADM

## 2020-02-10 RX ORDER — SODIUM,POTASSIUM PHOSPHATES 280-250MG
2 POWDER IN PACKET (EA) ORAL EVERY 4 HOURS PRN
Status: DISCONTINUED | OUTPATIENT
Start: 2020-02-10 | End: 2020-02-26 | Stop reason: HOSPADM

## 2020-02-10 RX ORDER — DEXAMETHASONE 0.5 MG/1
12 TABLET ORAL
Status: CANCELLED | OUTPATIENT
Start: 2020-02-12

## 2020-02-10 RX ORDER — FLUCONAZOLE 50 MG/1
400 TABLET ORAL DAILY
Status: CANCELLED | OUTPATIENT
Start: 2020-02-12

## 2020-02-10 RX ORDER — TRAMADOL HYDROCHLORIDE 50 MG/1
50 TABLET ORAL EVERY 8 HOURS PRN
Status: DISCONTINUED | OUTPATIENT
Start: 2020-02-10 | End: 2020-02-26 | Stop reason: HOSPADM

## 2020-02-10 RX ORDER — EPINEPHRINE 1 MG/ML
0.5 INJECTION, SOLUTION INTRACARDIAC; INTRAMUSCULAR; INTRAVENOUS; SUBCUTANEOUS ONCE AS NEEDED
Status: COMPLETED | OUTPATIENT
Start: 2020-02-12 | End: 2020-02-12

## 2020-02-10 RX ORDER — ONDANSETRON 8 MG/1
8 TABLET, ORALLY DISINTEGRATING ORAL EVERY 8 HOURS PRN
Status: DISCONTINUED | OUTPATIENT
Start: 2020-02-10 | End: 2020-02-14

## 2020-02-10 RX ORDER — ACYCLOVIR 200 MG/1
800 CAPSULE ORAL 2 TIMES DAILY
Status: CANCELLED | OUTPATIENT
Start: 2020-02-12

## 2020-02-10 RX ORDER — MEPERIDINE HYDROCHLORIDE 50 MG/ML
50 INJECTION INTRAMUSCULAR; INTRAVENOUS; SUBCUTANEOUS ONCE AS NEEDED
Status: COMPLETED | OUTPATIENT
Start: 2020-02-12 | End: 2020-02-12

## 2020-02-10 RX ORDER — SODIUM CHLORIDE AND POTASSIUM CHLORIDE 150; 900 MG/100ML; MG/100ML
INJECTION, SOLUTION INTRAVENOUS CONTINUOUS
Status: CANCELLED | OUTPATIENT
Start: 2020-02-11

## 2020-02-10 RX ORDER — GABAPENTIN 300 MG/1
600 CAPSULE ORAL NIGHTLY
Status: DISCONTINUED | OUTPATIENT
Start: 2020-02-10 | End: 2020-02-26 | Stop reason: HOSPADM

## 2020-02-10 RX ORDER — SERTRALINE HYDROCHLORIDE 50 MG/1
50 TABLET, FILM COATED ORAL DAILY
Status: DISCONTINUED | OUTPATIENT
Start: 2020-02-11 | End: 2020-02-26 | Stop reason: HOSPADM

## 2020-02-10 RX ORDER — SODIUM CHLORIDE AND POTASSIUM CHLORIDE 150; 900 MG/100ML; MG/100ML
INJECTION, SOLUTION INTRAVENOUS CONTINUOUS
Status: DISCONTINUED | OUTPATIENT
Start: 2020-02-12 | End: 2020-02-10 | Stop reason: SDUPTHER

## 2020-02-10 RX ORDER — LORAZEPAM 2 MG/ML
1 INJECTION INTRAMUSCULAR EVERY 6 HOURS PRN
Status: DISCONTINUED | OUTPATIENT
Start: 2020-02-10 | End: 2020-02-26 | Stop reason: HOSPADM

## 2020-02-10 RX ORDER — FLUCONAZOLE 200 MG/1
400 TABLET ORAL DAILY
Status: DISCONTINUED | OUTPATIENT
Start: 2020-02-11 | End: 2020-02-26

## 2020-02-10 RX ORDER — ONDANSETRON 4 MG/1
8 TABLET, ORALLY DISINTEGRATING ORAL EVERY 8 HOURS PRN
Status: CANCELLED | OUTPATIENT
Start: 2020-02-11

## 2020-02-10 RX ORDER — ONDANSETRON 4 MG/1
16 TABLET, ORALLY DISINTEGRATING ORAL
Status: CANCELLED | OUTPATIENT
Start: 2020-02-12

## 2020-02-10 RX ORDER — SODIUM CHLORIDE 0.9 % (FLUSH) 0.9 %
10 SYRINGE (ML) INJECTION
Status: CANCELLED | OUTPATIENT
Start: 2020-02-11

## 2020-02-10 RX ORDER — PROCHLORPERAZINE EDISYLATE 5 MG/ML
10 INJECTION INTRAMUSCULAR; INTRAVENOUS EVERY 6 HOURS PRN
Status: CANCELLED | OUTPATIENT
Start: 2020-02-11

## 2020-02-10 RX ORDER — AMLODIPINE BESYLATE 5 MG/1
5 TABLET ORAL DAILY
Status: DISCONTINUED | OUTPATIENT
Start: 2020-02-11 | End: 2020-02-26 | Stop reason: HOSPADM

## 2020-02-10 RX ORDER — FUROSEMIDE 10 MG/ML
100 INJECTION INTRAMUSCULAR; INTRAVENOUS ONCE AS NEEDED
Status: COMPLETED | OUTPATIENT
Start: 2020-02-12 | End: 2020-02-12

## 2020-02-10 RX ORDER — NITROGLYCERIN 0.4 MG/1
0.4 TABLET SUBLINGUAL ONCE AS NEEDED
Status: COMPLETED | OUTPATIENT
Start: 2020-02-12 | End: 2020-02-12

## 2020-02-10 RX ORDER — SODIUM CHLORIDE AND POTASSIUM CHLORIDE 150; 900 MG/100ML; MG/100ML
INJECTION, SOLUTION INTRAVENOUS CONTINUOUS
Status: DISCONTINUED | OUTPATIENT
Start: 2020-02-10 | End: 2020-02-13

## 2020-02-10 RX ORDER — LORAZEPAM 2 MG/ML
1 INJECTION INTRAMUSCULAR EVERY 6 HOURS PRN
Status: CANCELLED | OUTPATIENT
Start: 2020-02-11

## 2020-02-10 RX ORDER — LEVOFLOXACIN 500 MG/1
500 TABLET, FILM COATED ORAL DAILY
Status: CANCELLED | OUTPATIENT
Start: 2020-02-12

## 2020-02-10 RX ORDER — ONDANSETRON 8 MG/1
16 TABLET, ORALLY DISINTEGRATING ORAL
Status: COMPLETED | OUTPATIENT
Start: 2020-02-11 | End: 2020-02-12

## 2020-02-10 RX ORDER — CLONIDINE HYDROCHLORIDE 0.1 MG/1
0.1 TABLET ORAL ONCE AS NEEDED
Status: COMPLETED | OUTPATIENT
Start: 2020-02-12 | End: 2020-02-12

## 2020-02-10 RX ORDER — HEPARIN SODIUM 1000 [USP'U]/ML
1600 INJECTION, SOLUTION INTRAVENOUS; SUBCUTANEOUS
Status: CANCELLED | OUTPATIENT
Start: 2020-02-11

## 2020-02-10 RX ORDER — HEPARIN SODIUM 1000 [USP'U]/ML
1600 INJECTION, SOLUTION INTRAVENOUS; SUBCUTANEOUS
Status: DISCONTINUED | OUTPATIENT
Start: 2020-02-10 | End: 2020-02-26 | Stop reason: HOSPADM

## 2020-02-10 RX ORDER — GABAPENTIN 300 MG/1
300 CAPSULE ORAL DAILY
Status: DISCONTINUED | OUTPATIENT
Start: 2020-02-11 | End: 2020-02-26 | Stop reason: HOSPADM

## 2020-02-10 RX ORDER — DIPHENHYDRAMINE HYDROCHLORIDE 50 MG/ML
25 INJECTION INTRAMUSCULAR; INTRAVENOUS
Status: DISCONTINUED | OUTPATIENT
Start: 2020-02-10 | End: 2020-02-24

## 2020-02-10 RX ORDER — SODIUM CHLORIDE 0.9 % (FLUSH) 0.9 %
10 SYRINGE (ML) INJECTION
Status: DISCONTINUED | OUTPATIENT
Start: 2020-02-10 | End: 2020-02-26 | Stop reason: HOSPADM

## 2020-02-10 RX ADMIN — POTASSIUM CHLORIDE AND SODIUM CHLORIDE: 900; 150 INJECTION, SOLUTION INTRAVENOUS at 09:02

## 2020-02-10 RX ADMIN — GABAPENTIN 600 MG: 300 CAPSULE ORAL at 09:02

## 2020-02-10 RX ADMIN — ENOXAPARIN SODIUM 40 MG: 100 INJECTION SUBCUTANEOUS at 07:02

## 2020-02-10 NOTE — ASSESSMENT & PLAN NOTE
- had abnormal PFTs during transplant evaluation 10/2019; seen by pulm; no significant desaturation on 6MWT; okay per transplant per pulm

## 2020-02-10 NOTE — SUBJECTIVE & OBJECTIVE
Patient information was obtained from patient and past medical records.     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     Extensive PMH as below.    2 prior CVAs (one in 2008, one in 2011)  L breast cancer DCIS stage 0 (s/p lumpectomy and radiation, no endocrine tx due to CVA)  HTN  DM II  Neuropathy, b/l hands  Prior smoker, quit in 2011  Hepatic lesions - vascular per recent MRI in 09 /2019 with no changes; will confirm no further rascon needed from hep  Statin Intolerance - on praluent, PCSK9 inhibitor   HFpEF - EF 55%, diastolic dysfunction  Anxiety/Depression    Facility-Administered Medications Prior to Admission   Medication Dose Route Frequency Provider Last Rate Last Dose    zoledronic acid (ZOMETA) 4 mg in sodium chloride 0.9% 100 mL IVPB  4 mg Intravenous 1 time in Clinic/HOD Genny Napoles MD         Medications Prior to Admission   Medication Sig Dispense Refill Last Dose    acyclovir (ZOVIRAX) 400 MG tablet Take 1 tablet (400 mg total) by mouth 2 (two) times daily. 120 tablet 2 2/10/2020 at Unknown time    amLODIPine (NORVASC) 5 MG tablet TAKE 1 TABLET BY MOUTH EVERY DAY 90 tablet 3 2/10/2020 at Unknown time    ferrous gluconate 324 mg (37.5 mg iron) Tab TAKE 1 TABLET BY MOUTH 2 (TWO) TIMES DAILY WITH MEALS. 180 tablet 0 2/10/2020 at Unknown time    gabapentin (NEURONTIN) 300 MG capsule TAKE 1 CAPSULE (300 MG TOTAL) BY MOUTH 2 (TWO) TIMES DAILY. (Patient taking differently: Take 300 mg by mouth 2 (two) times daily. ) 180 capsule 3 2/10/2020 at Unknown time    omeprazole (PRILOSEC) 40 MG capsule TAKE 1 CAPSULE BY MOUTH EVERY DAY 90 capsule 3 2/10/2020 at Unknown time     alirocumab (PRALUENT PEN) 75 mg/mL PnIj Inject 1 mL (75 mg total) into the skin every 14 (fourteen) days. 6 Syringe 3 Taking    bisacodyl (DULCOLAX) 5 mg EC tablet Take 5 mg by mouth once daily.   Taking    loratadine (CLARITIN) 10 mg tablet Take 10 mg by mouth nightly.   Taking    ondansetron (ZOFRAN) 4 MG tablet Take 1 tablet (4 mg total) by mouth every 6 (six) hours as needed for Nausea. 30 tablet 1 Taking    sertraline (ZOLOFT) 50 MG tablet TAKE 1 TABLET (50 MG TOTAL) BY MOUTH ONCE DAILY. 90 tablet 3 Taking    traMADol (ULTRAM) 50 mg tablet Take 1 tablet (50 mg total) by mouth 2 (two) times daily as needed for Pain. 60 tablet 3 Taking    vitamin D 1000 units Tab Take 185 mg by mouth nightly.    Taking    [DISCONTINUED] ondansetron (ZOFRAN-ODT) 8 MG TbDL Take 1 tablet (8 mg total) by mouth every 8 (eight) hours as needed (nausea/vomiting). 60 tablet 1 Taking       Lipitor [atorvastatin]     Past Medical History:   Diagnosis Date    Acute respiratory failure with hypoxia 4/30/2019    FUENTES (acute kidney injury) 5/1/2019    Allergy     Anemia     Aortic aneurysm     Breast cancer 10/2011    left breast Stage 0 DCIS    Chronic diastolic congestive heart failure 11/6/2015    Colon polyp     GERD (gastroesophageal reflux disease)     Hiatal hernia     History of colonic polyps     Hx of psychiatric care     Zoloft    HX: breast cancer     Hyperlipemia     Hypertension     ICH (intracerebral hemorrhage)     Major depressive disorder, single episode, mild 6/23/2016    Nuclear sclerosis 7/21/2014    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 7/21/2014    PAD (peripheral artery disease) 11/6/2015    Psychiatric problem     Statin-induced rhabdomyolysis     4/2015     Stroke 4/2011    Type 2 diabetes mellitus without complication, without long-term current use of insulin 2/10/2020    Walker as ambulation aid      Past Surgical History:   Procedure Laterality Date     APPENDECTOMY      BONE MARROW BIOPSY Left 10/3/2019    Procedure: Biopsy-bone marrow;  Surgeon: Jere Tineo MD;  [facility];  Service: Oncology;  Laterality: Left;    BREAST BIOPSY Left 10/2011    BREAST LUMPECTOMY Left     DCIS    COLONOSCOPY      COLONOSCOPY N/A 2020    Procedure: COLONOSCOPY;  Surgeon: Quang De La Cruz MD;  Location: [facility] ENDO (4TH FLR);  Service: Endoscopy;  Laterality: N/A;    CYST REMOVAL      back    ESOPHAGOGASTRODUODENOSCOPY  2016    duodenal angioectasia    ESOPHAGOGASTRODUODENOSCOPY N/A 2020    Procedure: EGD (ESOPHAGOGASTRODUODENOSCOPY);  Surgeon: Quang De La Cruz MD;  Location: [facility] ENDO (4TH FLR);  Service: Endoscopy;  Laterality: N/A;    FOOT FRACTURE SURGERY  10/2012    right foot, with R hallux valgus repair    FRACTURE SURGERY Right     broken toe repiar    HEMORRHOID SURGERY      LIVER BIOPSY  2015    essentially normal, no fibrosis    TUBAL LIGATION      UPPER GASTROINTESTINAL ENDOSCOPY       Family History     Problem Relation (Age of Onset)    Breast cancer Sister (62)    Cancer Sister (63), Father    Fibroids Daughter    Hypertension Daughter, Son, Maternal Grandmother    No Known Problems Sister, Mother, Brother, Brother, Maternal Aunt, Maternal Uncle, Paternal Aunt, Paternal Uncle, Maternal Grandfather, Paternal Grandmother, Paternal Grandfather        Tobacco Use    Smoking status: Former Smoker     Packs/day: 0.50     Years: 20.00     Pack years: 10.00     Types: Cigarettes     Last attempt to quit: 2011     Years since quittin.8    Smokeless tobacco: Never Used   Substance and Sexual Activity    Alcohol use: Not Currently     Comment: none since 2020, very rare wine     Drug use: No    Sexual activity: Yes     Partners: Male     Birth control/protection: Post-menopausal       Review of Systems   Constitutional: Negative for activity change, appetite change, chills, diaphoresis, fatigue, fever  and unexpected weight change.   HENT: Negative for congestion, dental problem, mouth sores, nosebleeds, postnasal drip, rhinorrhea, sinus pressure, sore throat and trouble swallowing.    Eyes: Negative for photophobia and visual disturbance.   Respiratory: Negative for cough and shortness of breath.    Cardiovascular: Negative for chest pain, palpitations and leg swelling.   Gastrointestinal: Negative for abdominal distention, abdominal pain, blood in stool, constipation, diarrhea, nausea and vomiting.   Genitourinary: Negative for difficulty urinating, dysuria, frequency, hematuria, menstrual problem, urgency and vaginal bleeding.   Musculoskeletal: Negative for arthralgias, back pain and myalgias.   Skin: Negative for pallor and rash.   Allergic/Immunologic: Negative for immunocompromised state.   Neurological: Positive for numbness. Negative for dizziness, syncope, weakness and headaches.        Chronic neuropathy   Hematological: Negative for adenopathy. Does not bruise/bleed easily.   Psychiatric/Behavioral: Negative for confusion. The patient is not nervous/anxious.      Objective:     Vital Signs (Most Recent):    Vital Signs (24h Range):  Temp:  [98.2 °F (36.8 °C)] 98.2 °F (36.8 °C)  Pulse:  [70] 70  Resp:  [16] 16  SpO2:  [95 %] 95 %  BP: (131)/(76) 131/76        There is no height or weight on file to calculate BMI.  There is no height or weight on file to calculate BSA.    ECOG SCORE         KPS 80%    Lines/Drains/Airways     Central Venous Catheter Line     Name Duration         Hemodialysis Catheter 02/03/20 0904 right internal jugular 7 days                Physical Exam   Constitutional: She is oriented to person, place, and time. She appears well-developed and well-nourished. No distress.   HENT:   Mouth/Throat: Oropharynx is clear and moist. No oropharyngeal exudate or posterior oropharyngeal erythema.   Eyes: Pupils are equal, round, and reactive to light. Conjunctivae are normal. No scleral  icterus.   Neck: Normal range of motion. Neck supple. No thyromegaly present.   Cardiovascular: Normal rate, regular rhythm, normal heart sounds and intact distal pulses.   Pulmonary/Chest: Effort normal and breath sounds normal. No respiratory distress.   Abdominal: Soft. Bowel sounds are normal. She exhibits no distension. There is no splenomegaly or hepatomegaly. There is no tenderness.   Musculoskeletal: Normal range of motion. She exhibits no edema or tenderness.   Neurological: She is alert and oriented to person, place, and time. No cranial nerve deficit. Coordination normal.   Skin: No rash noted. No cyanosis. No pallor. Nails show no clubbing.   Vascath intact to right CW, no redness or drainage   Psychiatric: She has a normal mood and affect. Thought content normal.   Vitals reviewed.      Significant Labs:   CBC:   Recent Labs   Lab 02/10/20  1522   WBC 2.03*   HGB 10.5*   HCT 35.3*   *    and CMP:   Recent Labs   Lab 02/10/20  1522      K 3.6      CO2 31*   GLU 92   BUN 16   CREATININE 1.0   CALCIUM 9.6   PROT 7.5   ALBUMIN 3.9   BILITOT 0.3   ALKPHOS 114   AST 17   ALT 12   ANIONGAP 8   EGFRNONAA 57.2*       Diagnostic Results:  None

## 2020-02-10 NOTE — ASSESSMENT & PLAN NOTE
-Today is Day -2  -will receive melphalan 140mg/m2 tomorrow  -plan for stem cell transplant on Wednesday 2/12     Regimen as below:  Transplant Day -2: IV fluids  Transplant Day -1 through Day 0: Antiemetics   Transplant Day -1: Melphalan   Transplant Day 0: Stem Cell Infusion   Transplant Day +7: Growth factor begins

## 2020-02-10 NOTE — HPI
69 y.o. female patient of Dr. Bro who presents today for admission for planned Verenice 140 Auto SCT. She has received cardiac, pulm, GI, and gyn clearance for transplant. Myeloma in CR as of Dec 2019 marrow biopsy and biochemical studies. She is feeling well overall today. Slightly anxious. Denies fevers, chills, chest pain, sob, n/v/d/c. Continues with chronic neuropathy.

## 2020-02-10 NOTE — HOSPITAL COURSE
"02/10/2020 admitted from clinic for Verenice 140 autologous stem cell transplant. Will start IVF tonight  02/11/2020 Day -1 Verenice auto, received chemo this am without difficulty. NEON, no complaints this am. VSS.   02/12/2020 Day 0 Verenice auto, received stem cell infusion of 2.85 x 10^6 (3 bags) without difficulty except for hypotension after which resolved with 500 cc NS bolus. Denies dizziness, headaches, nausea or diarrhea.   02/13/2020 Day +1 Verenice auto, NEON, VSS, no complaints this am  02/14/2020  Day +2 Verenice auto, fatigue and decreased appetite noted today. Some "diarrhea" but not loose/watery. Nausea so scheduled zofran. Will consider IVFs tomorrow if needed  2/15/2020 Day +3 Verenice auto. Nausea and diarrhea are better.  Complained of mild decreased in appetite.  Vitals stable. WBC-0.33, ANC of 280, hemoglobin-9.0 and platelets 158 K  02/16/2020  Day +4 Verenice auto. Nausea and diarrhea are better.  Vitals stable. WBC-0.30, ANC of 260, hemoglobin-8.8 and platelets 161 K  02/17/2020 Day +5 Verenice auto. VSS, afebrile, now on scheduled Zofran and denies nausea this am. Denies diarrhea as well. Complains of poor appetite. BP low this am and holding scheduled BP meds  02/18/2020 Day +6 Verenice auto. VSS, afebrile, ANC 0, reports nausea this am and diarrhea controlled. Denies pain or mouth sores.  02/19/2020 Day +7 Verenice auto, ANC 40 starting Neupogen today. Afebrile, VSS. Nausea and diarrhea controlled.   02/20/2020 Day +8 Verenice auto, ANC 0 starting Neupogen today. Afebrile, VSS. Nausea and diarrhea controlled. Poor appetite due to taste changes  02/21/2020: Day + 9 from Verenice Auto SCT for MM. ANC 0 today. Continues to be afebrile. VSS. Loose stools. Checking for c-diff. Will start imodium if negative. Nausea responding well to zofran. Eating 25-50% of meals  02/22/2020 Day + 10 Verenice Auto SCT for MM. ANC 0. NAEON, HDS. 2 BM overnight, C.diff negative, started PRN Immodium.   02/23/2020 Day + 11 Verenice Auto SCT for MM. ANC 0. NAEON, HDS. Complaining " of abdominal bloating. Platelet count is 3, given 1pack platelets. Hgb 6.8, ordered 1u PRBCs.   02/24/2020 Day +12 Verenice AutoSCT for MM. ANC pending. She had diarrhea yesterday x2 yesterday.  Nausea controlled and no vomiting now. Feeling better today. She is working with PT 2x week.   02/25/2020 Day +13 Verenice AutoSCT for MM. ANC is 1500. She is doing well, nausea is better. Probably DC tomorrow. She lives locally.   2/2/6/2020 Day +14 Verenice AutoSCT for MM. Pt doing well without nausea and stools are soft. Plan for d/c later today.

## 2020-02-10 NOTE — ASSESSMENT & PLAN NOTE
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) High Risk disease; complex karyotype by CG; , t(14;20), monosomy 13 on FISH  -She has achieved and tolerated well VRd x completing 7, 28 day cycles and achieving stringent CR as of Dec 2019 marrow/biochemical studies   -adequate response to proceed with autoSCT  -she has PS 1-2 and multiple abnormalities in her pre transplant eval that have been cleared by pulmonology, cardiology, gynecology and are not prohibitive to proceed with transplant  -she has JESSE - sp  one dose of iv feraheme with response  will need reassessment by GI; if colonosocpy normal  proceed with transplant   -no further velcade maintenance pre transplant, last dose was 12/24/19  -plan for reduced dose clifton 140mg/m2 in light of age

## 2020-02-10 NOTE — LETTER
February 17, 2020    Shelby Thompson  [address] [facility] [address]            [address] [facility] [address]  Phone: [phone]  Fax: [phone] February 17, 2020     Patient: Shelby Thompson   YOB: 1950   Date of Visit: 1/24/2020       To Whom It May Concern:    It is my medical opinion that Shelby Thompson will require professional cleaning services due to low immune system for the next 4 months.     If you have any questions or concerns, please don't hesitate to call.    Sincerely,                Juli Flores DNP, NP  Hematology/Oncology

## 2020-02-10 NOTE — ASSESSMENT & PLAN NOTE
-vascular per recent MRI in 09/2019 with no changes; seen by hepatology pre-transplant and signed off; recommend repeat MRI in 6 months

## 2020-02-10 NOTE — ASSESSMENT & PLAN NOTE
-hx of JESSE; received single dose feraheme pretransplant  -colonoscopy okay for transplant  -will monitor daily CBC inpatient  -transfuse for Hgb <7, Plt <10K

## 2020-02-10 NOTE — ASSESSMENT & PLAN NOTE
-pt has had numbness/tingling to hands and BUE tremors post CVA, for which she is on gabapentin 600 mg BID  - prn tramadol

## 2020-02-11 LAB
ABO + RH BLD: NORMAL
ALBUMIN SERPL BCP-MCNC: 3.5 G/DL (ref 3.5–5.2)
ALBUMIN SERPL BCP-MCNC: 3.5 G/DL (ref 3.5–5.2)
ALP SERPL-CCNC: 104 U/L (ref 55–135)
ALP SERPL-CCNC: 104 U/L (ref 55–135)
ALT SERPL W/O P-5'-P-CCNC: 11 U/L (ref 10–44)
ALT SERPL W/O P-5'-P-CCNC: 11 U/L (ref 10–44)
ANION GAP SERPL CALC-SCNC: 11 MMOL/L (ref 8–16)
ANION GAP SERPL CALC-SCNC: 11 MMOL/L (ref 8–16)
AST SERPL-CCNC: 17 U/L (ref 10–40)
AST SERPL-CCNC: 17 U/L (ref 10–40)
BASOPHILS # BLD AUTO: 0.01 K/UL (ref 0–0.2)
BASOPHILS # BLD AUTO: 0.01 K/UL (ref 0–0.2)
BASOPHILS NFR BLD: 0.5 % (ref 0–1.9)
BASOPHILS NFR BLD: 0.5 % (ref 0–1.9)
BILIRUB SERPL-MCNC: 0.2 MG/DL (ref 0.1–1)
BILIRUB SERPL-MCNC: 0.2 MG/DL (ref 0.1–1)
BLD GP AB SCN CELLS X3 SERPL QL: NORMAL
BUN SERPL-MCNC: 15 MG/DL (ref 8–23)
BUN SERPL-MCNC: 15 MG/DL (ref 8–23)
CALCIUM SERPL-MCNC: 8.5 MG/DL (ref 8.7–10.5)
CALCIUM SERPL-MCNC: 8.5 MG/DL (ref 8.7–10.5)
CHLORIDE SERPL-SCNC: 107 MMOL/L (ref 95–110)
CHLORIDE SERPL-SCNC: 107 MMOL/L (ref 95–110)
CO2 SERPL-SCNC: 26 MMOL/L (ref 23–29)
CO2 SERPL-SCNC: 26 MMOL/L (ref 23–29)
CREAT SERPL-MCNC: 0.8 MG/DL (ref 0.5–1.4)
CREAT SERPL-MCNC: 0.8 MG/DL (ref 0.5–1.4)
DIFFERENTIAL METHOD: ABNORMAL
DIFFERENTIAL METHOD: ABNORMAL
EOSINOPHIL # BLD AUTO: 0.1 K/UL (ref 0–0.5)
EOSINOPHIL # BLD AUTO: 0.1 K/UL (ref 0–0.5)
EOSINOPHIL NFR BLD: 4.5 % (ref 0–8)
EOSINOPHIL NFR BLD: 4.5 % (ref 0–8)
ERYTHROCYTE [DISTWIDTH] IN BLOOD BY AUTOMATED COUNT: 20.8 % (ref 11.5–14.5)
ERYTHROCYTE [DISTWIDTH] IN BLOOD BY AUTOMATED COUNT: 20.8 % (ref 11.5–14.5)
EST. GFR  (AFRICAN AMERICAN): >60 ML/MIN/1.73 M^2
EST. GFR  (AFRICAN AMERICAN): >60 ML/MIN/1.73 M^2
EST. GFR  (NON AFRICAN AMERICAN): >60 ML/MIN/1.73 M^2
EST. GFR  (NON AFRICAN AMERICAN): >60 ML/MIN/1.73 M^2
GLUCOSE SERPL-MCNC: 103 MG/DL (ref 70–110)
GLUCOSE SERPL-MCNC: 103 MG/DL (ref 70–110)
HCT VFR BLD AUTO: 32.7 % (ref 37–48.5)
HCT VFR BLD AUTO: 32.7 % (ref 37–48.5)
HGB BLD-MCNC: 9.7 G/DL (ref 12–16)
HGB BLD-MCNC: 9.7 G/DL (ref 12–16)
IMM GRANULOCYTES # BLD AUTO: 0.02 K/UL (ref 0–0.04)
IMM GRANULOCYTES # BLD AUTO: 0.02 K/UL (ref 0–0.04)
IMM GRANULOCYTES NFR BLD AUTO: 0.9 % (ref 0–0.5)
IMM GRANULOCYTES NFR BLD AUTO: 0.9 % (ref 0–0.5)
LYMPHOCYTES # BLD AUTO: 0.5 K/UL (ref 1–4.8)
LYMPHOCYTES # BLD AUTO: 0.5 K/UL (ref 1–4.8)
LYMPHOCYTES NFR BLD: 20.4 % (ref 18–48)
LYMPHOCYTES NFR BLD: 20.4 % (ref 18–48)
MAGNESIUM SERPL-MCNC: 1.8 MG/DL (ref 1.6–2.6)
MAGNESIUM SERPL-MCNC: 1.8 MG/DL (ref 1.6–2.6)
MCH RBC QN AUTO: 24.7 PG (ref 27–31)
MCH RBC QN AUTO: 24.7 PG (ref 27–31)
MCHC RBC AUTO-ENTMCNC: 29.7 G/DL (ref 32–36)
MCHC RBC AUTO-ENTMCNC: 29.7 G/DL (ref 32–36)
MCV RBC AUTO: 83 FL (ref 82–98)
MCV RBC AUTO: 83 FL (ref 82–98)
MONOCYTES # BLD AUTO: 0.4 K/UL (ref 0.3–1)
MONOCYTES # BLD AUTO: 0.4 K/UL (ref 0.3–1)
MONOCYTES NFR BLD: 16.7 % (ref 4–15)
MONOCYTES NFR BLD: 16.7 % (ref 4–15)
NEUTROPHILS # BLD AUTO: 1.3 K/UL (ref 1.8–7.7)
NEUTROPHILS # BLD AUTO: 1.3 K/UL (ref 1.8–7.7)
NEUTROPHILS NFR BLD: 57 % (ref 38–73)
NEUTROPHILS NFR BLD: 57 % (ref 38–73)
NRBC BLD-RTO: 0 /100 WBC
NRBC BLD-RTO: 0 /100 WBC
PHOSPHATE SERPL-MCNC: 3.7 MG/DL (ref 2.7–4.5)
PHOSPHATE SERPL-MCNC: 3.7 MG/DL (ref 2.7–4.5)
PLATELET # BLD AUTO: 120 K/UL (ref 150–350)
PLATELET # BLD AUTO: 120 K/UL (ref 150–350)
PMV BLD AUTO: ABNORMAL FL (ref 9.2–12.9)
PMV BLD AUTO: ABNORMAL FL (ref 9.2–12.9)
POTASSIUM SERPL-SCNC: 3.7 MMOL/L (ref 3.5–5.1)
POTASSIUM SERPL-SCNC: 3.7 MMOL/L (ref 3.5–5.1)
PROT SERPL-MCNC: 6.7 G/DL (ref 6–8.4)
PROT SERPL-MCNC: 6.7 G/DL (ref 6–8.4)
RBC # BLD AUTO: 3.92 M/UL (ref 4–5.4)
RBC # BLD AUTO: 3.92 M/UL (ref 4–5.4)
SODIUM SERPL-SCNC: 144 MMOL/L (ref 136–145)
SODIUM SERPL-SCNC: 144 MMOL/L (ref 136–145)
WBC # BLD AUTO: 2.21 K/UL (ref 3.9–12.7)
WBC # BLD AUTO: 2.21 K/UL (ref 3.9–12.7)

## 2020-02-11 PROCEDURE — 63600175 PHARM REV CODE 636 W HCPCS: Mod: HCNC | Performed by: INTERNAL MEDICINE

## 2020-02-11 PROCEDURE — 97165 OT EVAL LOW COMPLEX 30 MIN: CPT | Mod: HCNC

## 2020-02-11 PROCEDURE — 25000003 PHARM REV CODE 250: Mod: HCNC | Performed by: INTERNAL MEDICINE

## 2020-02-11 PROCEDURE — 99233 PR SUBSEQUENT HOSPITAL CARE,LEVL III: ICD-10-PCS | Mod: HCNC,,, | Performed by: INTERNAL MEDICINE

## 2020-02-11 PROCEDURE — 25000003 PHARM REV CODE 250: Mod: HCNC | Performed by: NURSE PRACTITIONER

## 2020-02-11 PROCEDURE — 97802 MEDICAL NUTRITION INDIV IN: CPT | Mod: HCNC

## 2020-02-11 PROCEDURE — 20600001 HC STEP DOWN PRIVATE ROOM: Mod: HCNC

## 2020-02-11 PROCEDURE — A9155 ARTIFICIAL SALIVA: HCPCS | Mod: HCNC | Performed by: INTERNAL MEDICINE

## 2020-02-11 PROCEDURE — 84100 ASSAY OF PHOSPHORUS: CPT | Mod: HCNC

## 2020-02-11 PROCEDURE — 80053 COMPREHEN METABOLIC PANEL: CPT | Mod: HCNC

## 2020-02-11 PROCEDURE — 97161 PT EVAL LOW COMPLEX 20 MIN: CPT | Mod: HCNC

## 2020-02-11 PROCEDURE — 83735 ASSAY OF MAGNESIUM: CPT | Mod: HCNC

## 2020-02-11 PROCEDURE — 97535 SELF CARE MNGMENT TRAINING: CPT | Mod: HCNC

## 2020-02-11 PROCEDURE — 99233 SBSQ HOSP IP/OBS HIGH 50: CPT | Mod: HCNC,,, | Performed by: INTERNAL MEDICINE

## 2020-02-11 PROCEDURE — 86850 RBC ANTIBODY SCREEN: CPT | Mod: HCNC

## 2020-02-11 PROCEDURE — 85025 COMPLETE CBC W/AUTO DIFF WBC: CPT | Mod: HCNC

## 2020-02-11 PROCEDURE — 63600175 PHARM REV CODE 636 W HCPCS: Mod: HCNC | Performed by: NURSE PRACTITIONER

## 2020-02-11 RX ADMIN — Medication 400 MG: at 04:02

## 2020-02-11 RX ADMIN — POTASSIUM CHLORIDE AND SODIUM CHLORIDE: 900; 150 INJECTION, SOLUTION INTRAVENOUS at 12:02

## 2020-02-11 RX ADMIN — SERTRALINE HYDROCHLORIDE 50 MG: 50 TABLET ORAL at 08:02

## 2020-02-11 RX ADMIN — ENOXAPARIN SODIUM 40 MG: 100 INJECTION SUBCUTANEOUS at 04:02

## 2020-02-11 RX ADMIN — POTASSIUM CHLORIDE AND SODIUM CHLORIDE: 900; 150 INJECTION, SOLUTION INTRAVENOUS at 08:02

## 2020-02-11 RX ADMIN — LEVOFLOXACIN 500 MG: 500 TABLET, FILM COATED ORAL at 08:02

## 2020-02-11 RX ADMIN — ONDANSETRON 16 MG: 8 TABLET, ORALLY DISINTEGRATING ORAL at 08:02

## 2020-02-11 RX ADMIN — AMLODIPINE BESYLATE 5 MG: 5 TABLET ORAL at 08:02

## 2020-02-11 RX ADMIN — PANTOPRAZOLE SODIUM 40 MG: 40 TABLET, DELAYED RELEASE ORAL at 08:02

## 2020-02-11 RX ADMIN — ACYCLOVIR 800 MG: 200 CAPSULE ORAL at 08:02

## 2020-02-11 RX ADMIN — GABAPENTIN 300 MG: 300 CAPSULE ORAL at 08:02

## 2020-02-11 RX ADMIN — POTASSIUM CHLORIDE 20 MEQ: 1500 TABLET, EXTENDED RELEASE ORAL at 04:02

## 2020-02-11 RX ADMIN — GABAPENTIN 600 MG: 300 CAPSULE ORAL at 08:02

## 2020-02-11 RX ADMIN — POTASSIUM CHLORIDE AND SODIUM CHLORIDE: 900; 150 INJECTION, SOLUTION INTRAVENOUS at 06:02

## 2020-02-11 RX ADMIN — MELPHALAN 250 MG: 50 INJECTION, POWDER, LYOPHILIZED, FOR SOLUTION INTRAVENOUS at 09:02

## 2020-02-11 RX ADMIN — Medication 30 ML: at 08:02

## 2020-02-11 RX ADMIN — HEPARIN SODIUM 1600 UNITS: 1000 INJECTION, SOLUTION INTRAVENOUS; SUBCUTANEOUS at 09:02

## 2020-02-11 RX ADMIN — Medication 30 ML: at 04:02

## 2020-02-11 RX ADMIN — POTASSIUM CHLORIDE AND SODIUM CHLORIDE: 900; 150 INJECTION, SOLUTION INTRAVENOUS at 04:02

## 2020-02-11 RX ADMIN — Medication 400 MG: at 08:02

## 2020-02-11 RX ADMIN — DEXAMETHASONE 12 MG: 4 TABLET ORAL at 08:02

## 2020-02-11 RX ADMIN — Medication 30 ML: at 12:02

## 2020-02-11 RX ADMIN — FLUCONAZOLE 400 MG: 200 TABLET ORAL at 08:02

## 2020-02-11 NOTE — PLAN OF CARE
Patient day -1 auto SCT. AAOx4, VSS, afebrile, and without injury. Fall precautions maintained. Friend at bedside. Walker available for patient use. Patient instructed on how to contact the nurse. Patient on room air without distress; patient on regular diet with good appetite. No complaints of pain or nausea. Oral mucosa remains intact. Saliva sub provided. Patient ambulating with stand-by to the bathroom, adequate output. IVF continued. Chemo administered per orders (see note). Questions and concerns have been addressed; will continue to monitor.

## 2020-02-11 NOTE — PLAN OF CARE
Patient remained free from falls throughout shift, call bell within reach. Day -1 prior to her Auto SCT. IVF initiated overnight. No complaints of pain or discomfort. Vitals stable, will continue to monitor.

## 2020-02-11 NOTE — PLAN OF CARE
Evaluation complete and goals set.  Cont with POC  Chyna Saldivar, OT  2/11/2020    Problem: Occupational Therapy Goal  Goal: Occupational Therapy Goal  Description  Goals to be met by: 3/2    Patient will increase functional independence with ADLs by performing:    Feeding with [place].  UE Dressing with [place].  LE Dressing with [place].  Grooming while seated with [place].  Toileting from toilet with [place] for hygiene and clothing management.      Outcome: Ongoing, Progressing

## 2020-02-11 NOTE — PROGRESS NOTES
[facility]  Hematology  Bone Marrow Transplant  Progress Note    Patient Name: Shelby Thompson  Admission Date: 2/10/2020  Hospital Length of Stay: 1 days  Code Status: Full Code    Subjective:     Interval History: Day -1 Verenice auto, received chemo this am without difficulty. NEON, no complaints this am. VSS.     Objective:     Vital Signs (Most Recent):  Temp: 97.8 °F (36.6 °C) (02/11/20 1138)  Pulse: 90 (02/11/20 1138)  Resp: 16 (02/11/20 1138)  BP: 139/78 (02/11/20 1138)  SpO2: 96 % (02/11/20 1138) Vital Signs (24h Range):  Temp:  [97.8 °F (36.6 °C)-98.6 °F (37 °C)] 97.8 °F (36.6 °C)  Pulse:  [65-90] 90  Resp:  [16-18] 16  SpO2:  [90 %-97 %] 96 %  BP: (109-139)/(56-80) 139/78     Weight: 73.4 kg (161 lb 13.1 oz)  Body mass index is 28.66 kg/m².  Body surface area is 1.81 meters squared.      Intake/Output - Last 3 Shifts       02/09 0700 - 02/10 0659 02/10 0700 - 02/11 0659 02/11 0700 - 02/12 0659    P.O.  600 240    I.V. (mL/kg)  965 (13.1) 552.5 (7.5)    Total Intake(mL/kg)  1565 (21.3) 792.5 (10.8)    Urine (mL/kg/hr)  700 1500 (4)    Stool   0    Total Output  700 1500    Net  +865 -707.5           Urine Occurrence  1 x     Stool Occurrence   1 x          Physical Exam   Constitutional: She is oriented to person, place, and time. She appears well-developed and well-nourished. No distress.   HENT:   Mouth/Throat: Oropharynx is clear and moist. No oropharyngeal exudate or posterior oropharyngeal erythema.   Eyes: Pupils are equal, round, and reactive to light. Conjunctivae are normal. No scleral icterus.   Neck: Normal range of motion. Neck supple. No thyromegaly present.   Cardiovascular: Normal rate, regular rhythm, normal heart sounds and intact distal pulses.   Pulmonary/Chest: Effort normal and breath sounds normal. No respiratory distress.   Abdominal: Soft. Bowel sounds are normal. She exhibits no distension. There is no splenomegaly or hepatomegaly. There is no tenderness.    Musculoskeletal: Normal range of motion. She exhibits no edema or tenderness.   Neurological: She is alert and oriented to person, place, and time. No cranial nerve deficit. Coordination normal.   Skin: No rash noted. No cyanosis. No pallor. Nails show no clubbing.   Vascath intact to right CW, no redness or drainage   Psychiatric: She has a normal mood and affect. Thought content normal.   Vitals reviewed.      Significant Labs:   CBC:   Recent Labs   Lab 02/10/20  1522 02/11/20  0256   WBC 2.03* 2.21*  2.21*   HGB 10.5* 9.7*  9.7*   HCT 35.3* 32.7*  32.7*   * 120*  120*    and CMP:   Recent Labs   Lab 02/10/20  1522 02/11/20  0256    144  144   K 3.6 3.7  3.7    107  107   CO2 31* 26  26   GLU 92 103  103   BUN 16 15  15   CREATININE 1.0 0.8  0.8   CALCIUM 9.6 8.5*  8.5*   PROT 7.5 6.7  6.7   ALBUMIN 3.9 3.5  3.5   BILITOT 0.3 0.2  0.2   ALKPHOS 114 104  104   AST 17 17  17   ALT 12 11  11   ANIONGAP 8 11  11   EGFRNONAA 57.2* >60.0  >60.0       Diagnostic Results:  None    Assessment/Plan:     * Stem cell transplant candidate  -Today is Day -1  -received Melphalan 140mg/m2 today without difficulty  -plan for stem cell transplant on Wednesday 2/12, will receive 3 bags (2.85 x 10^6) CD 34 stem cells     Regimen as below:  Transplant Day -2: IV fluids  Transplant Day -1 through Day 0: Antiemetics   Transplant Day -1: Melphalan   Transplant Day 0: Stem Cell Infusion   Transplant Day +7: Growth factor begins    Multiple myeloma in remission  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) High Risk disease; complex karyotype by CG; , t(14;20), monosomy 13 on FISH  -She has achieved and tolerated well VRd x completing 7, 28 day cycles and achieving stringent CR as of Dec 2019 marrow/biochemical studies   -adequate response to proceed with autoSCT  -she has PS 1-2 and multiple abnormalities in her pre transplant eval that have been  cleared by pulmonology, cardiology, gynecology and are not prohibitive to proceed with transplant  -she has JESSE - sp  one dose of iv feraheme with response  will need reassessment by GI; if colonosocpy normal  proceed with transplant   -no further velcade maintenance pre transplant, last dose was 12/24/19  -plan for reduced dose clifton 140mg/m2 in light of age       Type 2 diabetes mellitus without complication, without long-term current use of insulin  -monitor closely while inpatient; not on active medication    Essential hypertension  -continue norvasc    Drug-induced polyneuropathy  -pt has had numbness/tingling to hands and BUE tremors post CVA, for which she is on gabapentin 600 mg BID  - prn tramadol       Pancytopenia  -hx of JESSE; received single dose feraheme pretransplant  -colonoscopy okay for transplant  -will monitor daily CBC inpatient  -transfuse for Hgb <7, Plt <10K    Interstitial lung disease  - had abnormal PFTs during transplant evaluation 10/2019; seen by pulm; no significant desaturation on 6MWT; okay per transplant per pulm    Recurrent major depressive disorder, in full remission  -continue zoloft    Liver mass  -vascular per recent MRI in 09/2019 with no changes; seen by hepatology pre-transplant and signed off; recommend repeat MRI in 6 months    History of CVA with residual deficit  -one in 2008, one in 2011  -has been cleared by cardiology for transplant    Hyperlipemia  -on praluent, PCSK9 inhibitor; will hold on admit    Personal history of malignant neoplasm of breast  L breast cancer DCIS stage 0  -s/p lumpectomy and radiation, no endocrine tx due to CVA        VTE Risk Mitigation (From admission, onward)         Ordered     heparin (porcine) injection 1,600 Units  As needed (PRN)      02/10/20 2141     enoxaparin injection 40 mg  Daily      02/10/20 1738     IP VTE HIGH RISK PATIENT  Once      02/10/20 1738              Disposition: pending recovery post transplant    Kathleen Kimball,  NP  Bone Marrow Transplant  [facility]

## 2020-02-11 NOTE — PT/OT/SLP EVAL
Physical Therapy Evaluation    Patient Name:  Shelby Thompson   MRN:  [id number]  Admit Date: 2/10/2020  Admitting Diagnosis:  Stem cell transplant candidate   Length of Stay: 1 days  Recent Surgery: * No surgery found *      Recommendations:     Discharge Recommendations:  home health PT   Discharge Equipment Recommendations: none   Barriers to discharge: None    Assessment:     Shelby Thompson is a 69 y.o. female admitted with a medical diagnosis of Stem cell transplant candidate.  She presents with the following impairments/functional limitations: impaired endurance, impaired balance. Pt tolerated initial evaluation well today. Pt with good strength and mobility on this date and was able to ambulate without assist. Pt seems to be approaching baseline function but does demonstrate impaired endurance with continued activity and mobility at this time. After discharge pt will benefit from HHPT to further progress functional mobility and cardiopulmonary endurance as well as for home safety and energy conservation techniques.    Rehab Prognosis: Good; patient would benefit from acute skilled PT services to address these deficits and reach maximum level of function.      Plan:     During this hospitalization, patient to be seen   to address the identified rehab impairments via gait training, therapeutic activities, therapeutic exercises and progress towards the established goals.    · Plan of Care Expires:  03/11/20    Subjective     RN notified prior to session. Friends present upon PT entrance into room.    Chief Complaint: Pt had no complaints  Patient/Family Comments/goals: go home  Pain/Comfort:  · Pain Rating 1: 0/10  · Pain Rating Post-Intervention 1: 0/10    Living Environment:  Patient lives with  (elderly) in a single family home with 4 MARYA with BHR. Pt has a tub/shower with a shower chair.   Prior Level of Function: Patient reports being independence with mobility & with ADLs. PT reports req'ing SBA  for showering. Patient uses DME as follows: shower chair, rollator, grab bar. DME owned (not currently used): none.  Roles/Repsonsibilities:  Work: no. Drive: no. Managing Medicines/Managing Home: yes.   Patient reports they will have assistance from  (limited) and friends upon discharge.    Objective:     Additional staff present: OT for co-evaluation    Patient found seated eob with: peripheral IV     General Precautions: Standard, Cardiac fall   Orthopedic Precautions:N/A   Braces: N/A   Body mass index is 28.66 kg/m².  Oxygen Device: Room Air    Exams:  · Mental Status: Patient is AxOx4 and follows all multi-step verbal commands. Pt is Alert and Cooperative during session.  · Skin Integrity: Visible skin intact  · Edema: None noted   · Sensation: Intact  · Hearing: Intact  · Vision:  Intact  · Postural Assessment: slouched posture and rounded shoulders  · Range of Motion:  · RUE: WFL  · LUE: WFL  · RLE: WFL  · LLE: WFL  · Strength Exam:  · Upper Extremity Strength: grossly WFL  · Lower Extremity Strength: grossly WFL    Outcome Measures:  AM-PAC 6 CLICK MOBILITY  Turning over in bed (including adjusting bedclothes, sheets and blankets)?: 4  Sitting down on and standing up from a chair with arms (e.g., wheelchair, bedside commode, etc.): 3  Moving from lying on back to sitting on the side of the bed?: 4  Moving to and from a bed to a chair (including a wheelchair)?: 3  Need to walk in hospital room?: 3  Climbing 3-5 steps with a railing?: 3  Basic Mobility Total Score: 20     Functional Mobility:    Bed Mobility:   · Pt found/returned seated EOB    Sitting Balance at Edge of Bed:   Assistance Level Required: [place]    Transfers:   · Sit <> Stand Transfer: supervision with no assistive device   · Stand <> Sit Transfer: supervision with no assistive device   · [id number] from EOB    Standing Balance:   Assistance Level Required: Supervision   Patient used: no assistive device       Gait:   · Patient  ambulated: 30 ft   · Patient required: supervision  · Patient used:  rolling walker   · Gait Pattern observed: reciprocal gait  · Gait Deviation(s): occasional unsteady gait, decreased step length, wide base of support, decreased arm swing, shuffle gait, flexed posture and decreased geneva  · Impairments due to: decreased endurance  · all lines remained intact throughout ambulation trial  · Comments: Pt was able to perform vertical/horizontal head turns, 180 degree turns while ambulating, and avoid in-room obstacles without LOB. Pt appears generally unsteady due to shuffle gait pattern    Stairs:   Deferred due to pt's performance with above listed functional mobility    Education:   Time provided for education, counseling and discussion of health disposition in regards to patient's current status   All questions answered within PT scope of practice and to patient's satisfaction   PT role in POC to address current functional deficits   Pt educated on proper body mechanics, safety techniques, and energy conservation with PT facilitation and cueing throughout session   Whiteboard updated with pt's current mobility status documented above   Safe to perform step transfer to/from chair/bedside commode supervision and RW w/ nursing/PCT present   Pt to ambulate prn/day supervision and no AD with nsg/family in order to maintain functional mobility   Importance of OOB tolerance prn hrs/day to improve lung ventilation and expansion as well as strengthen postural musculature    Patient left seated EOB with all lines intact, call button in reach, RN notified and friend present.    GOALS:   Multidisciplinary Problems     Physical Therapy Goals        Problem: Physical Therapy Goal    Goal Priority Disciplines Outcome Goal Variances Interventions   Physical Therapy Goal     PT, PT/OT Ongoing, Progressing     Description:  Goals to be met by: 2/25/2020     Patient will increase functional independence with mobility by  performin. Bed to chair transfer with Supervision using Rolling Walker  2. Gait  x 150 feet with Supervision using Rolling Walker.   3. Ascend/descend 3 stair with bilateral Handrails Stand-by Assistance using no AD.   4. Lower extremity exercise program x20 reps per handout, with independence                      History:     Past Medical History:   Diagnosis Date    Acute respiratory failure with hypoxia 2019    FUENTES (acute kidney injury) 2019    Allergy     Anemia     Aortic aneurysm     Breast cancer 10/2011    left breast Stage 0 DCIS    Chronic diastolic congestive heart failure 2015    Colon polyp     GERD (gastroesophageal reflux disease)     Hiatal hernia     History of colonic polyps     Hx of psychiatric care     Zoloft    HX: breast cancer     Hyperlipemia     Hypertension     ICH (intracerebral hemorrhage)     Major depressive disorder, single episode, mild 2016    Nuclear sclerosis 2014    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 2014    PAD (peripheral artery disease) 2015    Psychiatric problem     Statin-induced rhabdomyolysis     2015     Stroke 2011    Type 2 diabetes mellitus without complication, without long-term current use of insulin 2/10/2020    Walker as ambulation aid        Past Surgical History:   Procedure Laterality Date    APPENDECTOMY      BONE MARROW BIOPSY Left 10/3/2019    Procedure: Biopsy-bone marrow;  Surgeon: Jeer Tineo MD;  Location: [facility] OR [address] [place] [address];  Service: Oncology;  Laterality: Left;    BREAST BIOPSY Left 10/2011    BREAST LUMPECTOMY Left     DCIS    COLONOSCOPY      COLONOSCOPY N/A 2020    Procedure: COLONOSCOPY;  Surgeon: Quang De La Cruz MD;  Location: [facility] (4TH FLR);  Service: Endoscopy;  Laterality: N/A;    CYST REMOVAL      back    ESOPHAGOGASTRODUODENOSCOPY  2016    duodenal angioectasia    ESOPHAGOGASTRODUODENOSCOPY N/A 2020     Procedure: EGD (ESOPHAGOGASTRODUODENOSCOPY);  Surgeon: Quang De La Cruz MD;  Location: [facility] ([address] [place] [address]);  Service: Endoscopy;  Laterality: N/A;    FOOT FRACTURE SURGERY  10/2012    right foot, with R hallux valgus repair    FRACTURE SURGERY Right     broken toe repiar    HEMORRHOID SURGERY      LIVER BIOPSY  04/2015    essentially normal, no fibrosis    TUBAL LIGATION      UPPER GASTROINTESTINAL ENDOSCOPY         Time Tracking:     PT Received On: 02/11/20  PT Start Time: 1152     PT Stop Time: 1203  PT Total Time (min): 11 min     Billable Minutes: Evaluation 11    Virginia Malhotra PT, DPT  2/11/2020  Pager: [phone]

## 2020-02-11 NOTE — SUBJECTIVE & OBJECTIVE
Subjective:     Interval History: Day -1 Verenice auto, received chemo this am without difficulty. NEON, no complaints this am. VSS.     Objective:     Vital Signs (Most Recent):  Temp: 97.8 °F (36.6 °C) (02/11/20 1138)  Pulse: 90 (02/11/20 1138)  Resp: 16 (02/11/20 1138)  BP: 139/78 (02/11/20 1138)  SpO2: 96 % (02/11/20 1138) Vital Signs (24h Range):  Temp:  [97.8 °F (36.6 °C)-98.6 °F (37 °C)] 97.8 °F (36.6 °C)  Pulse:  [65-90] 90  Resp:  [16-18] 16  SpO2:  [90 %-97 %] 96 %  BP: (109-139)/(56-80) 139/78     Weight: 73.4 kg (161 lb 13.1 oz)  Body mass index is 28.66 kg/m².  Body surface area is 1.81 meters squared.      Intake/Output - Last 3 Shifts       02/09 0700 - 02/10 0659 02/10 0700 - 02/11 0659 02/11 0700 - 02/12 0659    P.O.  600 240    I.V. (mL/kg)  965 (13.1) 552.5 (7.5)    Total Intake(mL/kg)  1565 (21.3) 792.5 (10.8)    Urine (mL/kg/hr)  700 1500 (4)    Stool   0    Total Output  700 1500    Net  +865 -707.5           Urine Occurrence  1 x     Stool Occurrence   1 x          Physical Exam   Constitutional: She is oriented to person, place, and time. She appears well-developed and well-nourished. No distress.   HENT:   Mouth/Throat: Oropharynx is clear and moist. No oropharyngeal exudate or posterior oropharyngeal erythema.   Eyes: Pupils are equal, round, and reactive to light. Conjunctivae are normal. No scleral icterus.   Neck: Normal range of motion. Neck supple. No thyromegaly present.   Cardiovascular: Normal rate, regular rhythm, normal heart sounds and intact distal pulses.   Pulmonary/Chest: Effort normal and breath sounds normal. No respiratory distress.   Abdominal: Soft. Bowel sounds are normal. She exhibits no distension. There is no splenomegaly or hepatomegaly. There is no tenderness.   Musculoskeletal: Normal range of motion. She exhibits no edema or tenderness.   Neurological: She is alert and oriented to person, place, and time. No cranial nerve deficit. Coordination normal.   Skin: No  rash noted. No cyanosis. No pallor. Nails show no clubbing.   Vascath intact to right CW, no redness or drainage   Psychiatric: She has a normal mood and affect. Thought content normal.   Vitals reviewed.      Significant Labs:   CBC:   Recent Labs   Lab 02/10/20  1522 02/11/20  0256   WBC 2.03* 2.21*  2.21*   HGB 10.5* 9.7*  9.7*   HCT 35.3* 32.7*  32.7*   * 120*  120*    and CMP:   Recent Labs   Lab 02/10/20  1522 02/11/20  0256    144  144   K 3.6 3.7  3.7    107  107   CO2 31* 26  26   GLU 92 103  103   BUN 16 15  15   CREATININE 1.0 0.8  0.8   CALCIUM 9.6 8.5*  8.5*   PROT 7.5 6.7  6.7   ALBUMIN 3.9 3.5  3.5   BILITOT 0.3 0.2  0.2   ALKPHOS 114 104  104   AST 17 17  17   ALT 12 11  11   ANIONGAP 8 11  11   EGFRNONAA 57.2* >60.0  >60.0       Diagnostic Results:  None

## 2020-02-11 NOTE — PLAN OF CARE
Recommendations    Recommendation:   1. Continue regular diet, encourage good PO intake of high calorie high protein foods   2. Add boost plus to increase intake   3. RD to monitor and follow up     Goals: pt to tolerate >85% of EEN/EPN by RD follow up   Nutrition Goal Status: new  Communication of RD Recs: other (comment)(POC)

## 2020-02-11 NOTE — CONSULTS
"  [facility]  Adult Nutrition  Consult Note    SUMMARY     Recommendations    Recommendation:   1. Continue regular diet, encourage good PO intake of high calorie high protein foods   2. Add boost plus to increase intake   3. RD to monitor and follow up     Goals: pt to tolerate >85% of EEN/EPN by RD follow up   Nutrition Goal Status: new  Communication of RD Recs: other (comment)(POC)    Reason for Assessment    Reason For Assessment: consult  Diagnosis: (Multiple myeloma)  Relevant Medical History: HTN; HLD; ICH; breast cancer; DM2; CHF  Interdisciplinary Rounds: did not attend  General Information Comments: Pt unavailable at time of visit, spoke with family in room. Endorses good PO intake, ~% of meals recently. States over past month intake has increased since chemo was paused. Pt drinks ensure at home, agrees to try boost plus to increase intake. No c/o N/V/C/D reported. No recent wt loss reported at this time. Chart shows UBW ~170 lbs over past few years. Unable to complete NFPE at this time, will continue to assess on follow up. Diet education completed on high calorie high protein diet and BMT nutrition therapy, handouts provided.   Nutrition Discharge Planning: adequate intake via PO diet     Nutrition Risk Screen    Nutrition Risk Screen: no indicators present    Nutrition/Diet History    Spiritual, Cultural Beliefs, Mormonism Practices, Values that Affect Care: no  Food Allergies: NKFA  Factors Affecting Nutritional Intake: None identified at this time    Anthropometrics    Temp: 98.2 °F (36.8 °C)  Height Method: Stated  Height: 5' 3" (160 cm)  Height (inches): 63 in  Weight Method: Standard Scale  Weight: 73.4 kg (161 lb 13.1 oz)  Weight (lb): 161.82 lb  Ideal Body Weight (IBW), Female: 115 lb  % Ideal Body Weight, Female (lb): 142.82 %  BMI (Calculated): 28.7  BMI Grade: 25 - 29.9 - overweight       Lab/Procedures/Meds    Pertinent Labs Reviewed: reviewed  Pertinent Labs " Comments: Ca 8.5  Pertinent Medications Reviewed: reviewed  Pertinent Medications Comments: acyclovir; dexamethasone; filgrastim; gabapentin; melphalan     Estimated/Assessed Needs    Weight Used For Calorie Calculations: 73.4 kg (161 lb 13.1 oz)  Energy Calorie Requirements (kcal): 1595-2557 kcal/d  Energy Need Method: Kcal/kg  Protein Requirements:  g/day   Weight Used For Protein Calculations: 73.4 kg (161 lb 13.1 oz)  Fluid Requirements (mL): 1 mL/kcal or per MD  RDA Method (mL): 2202    Nutrition Prescription Ordered    Current Diet Order: Regular diet     Evaluation of Received Nutrient/Fluid Intake    Energy Calories Required: meeting needs  Protein Required: meeting needs  Fluid Required: meeting needs  Comments: LBM 2/11  Tolerance: tolerating  % Intake of Estimated Energy Needs: 75 - 100 %  % Meal Intake: 75 - 100 %    Nutrition Risk    Level of Risk/Frequency of Follow-up: low     Assessment and Plan     Nutrition Problem  increased nutrient needs    Related to (etiology):   Physiological needs 2/2 multiple myeloma     Signs and Symptoms (as evidenced by):   Pt to receive BMT      Interventions  (treatment strategy):  Collaboration of care with other providers  Commercial beverage    Nutrition Diagnosis Status:   New    Monitor and Evaluation    Food and Nutrient Intake: energy intake, food and beverage intake  Food and Nutrient Adminstration: diet order  Knowledge/Beliefs/Attitudes: food and nutrition knowledge/skill  Anthropometric Measurements: weight, weight change  Biochemical Data, Medical Tests and Procedures: electrolyte and renal panel, lipid profile, gastrointestinal profile, glucose/endocrine profile, inflammatory profile  Nutrition-Focused Physical Findings: overall appearance     Nutrition Follow-Up    RD Follow-up?: Yes

## 2020-02-11 NOTE — PT/OT/SLP EVAL
Occupational Therapy   Evaluation    Name: Shelby Thompson  MRN: [id number]  Admitting Diagnosis:  Stem cell transplant candidate      Recommendations:     Discharge Recommendations: home  Discharge Equipment Recommendations:  none  Barriers to discharge:  Decreased caregiver support    Assessment:     Shelby Thompson is a 69 y.o. female with a medical diagnosis of Stem cell transplant candidate.  She presents supine with friend present.  Pt with fair balance and will benefit from skilled OT for max functional performance  Performance deficits affecting function: impaired endurance, impaired balance, impaired self care skills.      Rehab Prognosis: Good; patient would benefit from acute skilled OT services to address these deficits and reach maximum level of function.       Plan:     Patient to be seen 2 x/week to address the above listed problems via self-care/home management, therapeutic activities, therapeutic exercises  · Plan of Care Expires: 03/11/20  · Plan of Care Reviewed with: patient    Subjective     Chief Complaint: Pt without complaint   Patient/Family Comments/goals: return to home     Occupational Profile:  Living Environment: Pt lives with elderly spouse in 1 [place] house   Previous level of function: Pt with SBA for shower task with shower chair in place and indep with dressing and toielting  Equipment Used at Home:  none  Assistance upon Discharge: Spouse primary CG and is sick    Pain/Comfort:  · Pain Rating 1: 0/10    Patients cultural, spiritual, Anabaptist conflicts given the current situation: no    Objective:     Communicated with: RN prior to session.  Patient found supine with peripheral IV upon OT entry to room.    General Precautions: Standard, fall   Orthopedic Precautions:N/A   Braces: N/A     Occupational Performance:    Bed Mobility:    · Pt with indep bed mobility     Functional Mobility/Transfers:  · Functional Mobility: Pt with walker use for safe in room mobility     Activities  of Daily Living:  · Pt SBA for safe self care completion in room     Cognitive/Visual Perceptual:  Cognitive/Psychosocial Skills:     -       Oriented to: Person, Place, Time and Situation   -       Follows Commands/attention:Follows two-step commands  -       Communication: clear/fluent  -       Memory: No Deficits noted  -       Safety awareness/insight to disability: intact   -       Mood/Affect/Coping skills/emotional control: Appropriate     Physical Exam:  Upper Extremity Range of Motion:     -       Right Upper Extremity: WFL  -       Left Upper Extremity: WFL  Upper Extremity Strength:    -       Right Upper Extremity: WFL  -       Left Upper Extremity: WFL    AMPAC 6 Click ADL:  AMPAC Total Score: 24    Treatment & Education:  Evaluation complete and goals set.  Pt educated on safety, role of OT, importance of increased participation in self care for gains , expectations for participation, expectations for gains, POC, energy conservation, caregiver strain. White board updated.   _ ADL training   Education:    Patient left supine with all lines intact    GOALS:   Multidisciplinary Problems     Occupational Therapy Goals        Problem: Occupational Therapy Goal    Goal Priority Disciplines Outcome Interventions   Occupational Therapy Goal     OT, PT/OT Ongoing, Progressing    Description:  Goals to be met by: 3/2    Patient will increase functional independence with ADLs by performing:    Feeding with [place].  UE Dressing with [place].  LE Dressing with [place].  Grooming while seated with [place].  Toileting from toilet with Harrison for hygiene and clothing management.                       History:     Past Medical History:   Diagnosis Date    Acute respiratory failure with hypoxia 4/30/2019    FUENTES (acute kidney injury) 5/1/2019    Allergy     Anemia     Aortic aneurysm     Breast cancer 10/2011    left breast Stage 0 DCIS    Chronic diastolic congestive heart failure  11/6/2015    Colon polyp     GERD (gastroesophageal reflux disease)     Hiatal hernia     History of colonic polyps     Hx of psychiatric care     Zoloft    HX: breast cancer     Hyperlipemia     Hypertension     ICH (intracerebral hemorrhage)     Major depressive disorder, single episode, mild 6/23/2016    Nuclear sclerosis 7/21/2014    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 7/21/2014    PAD (peripheral artery disease) 11/6/2015    Psychiatric problem     Statin-induced rhabdomyolysis     4/2015     Stroke 4/2011    Type 2 diabetes mellitus without complication, without long-term current use of insulin 2/10/2020    Walker as ambulation aid        Past Surgical History:   Procedure Laterality Date    APPENDECTOMY      BONE MARROW BIOPSY Left 10/3/2019    Procedure: Biopsy-bone marrow;  Surgeon: Jere Tineo MD;  Location: [facility] OR [address] [place] [address];  Service: Oncology;  Laterality: Left;    BREAST BIOPSY Left 10/2011    BREAST LUMPECTOMY Left 2011    DCIS    COLONOSCOPY      COLONOSCOPY N/A 1/9/2020    Procedure: COLONOSCOPY;  Surgeon: Quang De La Cruz MD;  Location: [facility] ([facility]);  Service: Endoscopy;  Laterality: N/A;    CYST REMOVAL      back    ESOPHAGOGASTRODUODENOSCOPY  07/2016    duodenal angioectasia    ESOPHAGOGASTRODUODENOSCOPY N/A 1/9/2020    Procedure: EGD (ESOPHAGOGASTRODUODENOSCOPY);  Surgeon: Quang De La Cruz MD;  Location: [facility] (4TH FLR);  Service: Endoscopy;  Laterality: N/A;    FOOT FRACTURE SURGERY  10/2012    right foot, with R hallux valgus repair    FRACTURE SURGERY Right     broken toe repiar    HEMORRHOID SURGERY      LIVER BIOPSY  04/2015    essentially normal, no fibrosis    TUBAL LIGATION      UPPER GASTROINTESTINAL ENDOSCOPY         Time Tracking:     OT Date of Treatment: 02/11/20  OT Start Time: 1155  OT Stop Time: 1215  OT Total Time (min): 20 min    Billable Minutes:Evaluation 10  Self Care/Home Management  10    Chyna Saldivar, OT  2/11/2020

## 2020-02-11 NOTE — ASSESSMENT & PLAN NOTE
-Today is Day -1  -received Melphalan 140mg/m2 today without difficulty  -plan for stem cell transplant on Wednesday 2/12, will receive 3 bags (2.85 x 10^6) CD 34 stem cells     Regimen as below:  Transplant Day -2: IV fluids  Transplant Day -1 through Day 0: Antiemetics   Transplant Day -1: Melphalan   Transplant Day 0: Stem Cell Infusion   Transplant Day +7: Growth factor begins

## 2020-02-11 NOTE — PROGRESS NOTES
Chemotherapy Note:  Consent & CAR in chart. CAR & BSA verified by two chemo certified RNs. Premedicated with zofran and decadron PO. Melphalan and patient verified at bedside by two chemo certified RNs. Positive blood noted to right chest wall vascath. Melphalan administered to right chest wall vascath per orders. Ice therapy continued. Chemotherapy precautions maintained & patient educated. Will continue to monitor.

## 2020-02-11 NOTE — PLAN OF CARE
Discharge Recommendation: HHPT.    Evaluation completed today. PT goals appropriate.    Virginia Malhotra, PT, DPT  2020  Pager: [phone]        Problem: Physical Therapy Goal  Goal: Physical Therapy Goal  Description  Goals to be met by: 2020     Patient will increase functional independence with mobility by performin. Bed to chair transfer with Supervision using Rolling Walker  2. Gait  x 150 feet with Supervision using Rolling Walker.   3. Ascend/descend 3 stair with bilateral Handrails Stand-by Assistance using no AD.   4. Lower extremity exercise program x20 reps per handout, with independence     Outcome: Ongoing, Progressing

## 2020-02-11 NOTE — NURSING
"Patient admitted to the unit for SCT. Reports no discomfort. States she is "hungry". Diet has been order. Family at bedside.  "

## 2020-02-11 NOTE — H&P
[facility]  Hematology  Bone Marrow Transplant  H&P    Subjective:     Principal Problem: Stem cell transplant candidate    HPI: 69 y.o. female patient of Dr. Bro who presents today for admission for planned Verenice 140 Auto SCT. She has received cardiac, pulm, GI, and gyn clearance for transplant. Myeloma in CR as of Dec 2019 marrow biopsy and biochemical studies. She is feeling well overall today. Slightly anxious. Denies fevers, chills, chest pain, sob, n/v/d/c. Continues with chronic neuropathy.    Patient information was obtained from patient and past medical records.     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     Extensive PMH as below.    2 prior CVAs (one in 2008, one in 2011)  L breast cancer DCIS stage 0 (s/p lumpectomy and radiation, no endocrine tx due to CVA)  HTN  DM II  Neuropathy, b/l hands  Prior smoker, quit in 2011  Hepatic lesions - vascular per recent MRI in 09 /2019 with no changes; will confirm no further rascon needed from hep  Statin Intolerance - on praluent, PCSK9 inhibitor   HFpEF - EF 55%, diastolic dysfunction  Anxiety/Depression    Facility-Administered Medications Prior to Admission   Medication Dose Route Frequency Provider Last Rate Last Dose    zoledronic acid (ZOMETA) 4 mg in sodium chloride 0.9% 100 mL IVPB  4 mg Intravenous 1 time in Clinic/HOD Genny Napoles MD         Medications Prior to Admission   Medication Sig Dispense Refill Last Dose    acyclovir (ZOVIRAX) 400 MG tablet Take 1 tablet (400 mg total) by mouth 2 (two) times daily. 120 tablet 2 2/10/2020 at Unknown time    amLODIPine (NORVASC) 5 MG tablet TAKE 1 TABLET BY MOUTH  EVERY DAY 90 tablet 3 2/10/2020 at Unknown time    ferrous gluconate 324 mg (37.5 mg iron) Tab TAKE 1 TABLET BY MOUTH 2 (TWO) TIMES DAILY WITH MEALS. 180 tablet 0 2/10/2020 at Unknown time    gabapentin (NEURONTIN) 300 MG capsule TAKE 1 CAPSULE (300 MG TOTAL) BY MOUTH 2 (TWO) TIMES DAILY. (Patient taking differently: Take 300 mg by mouth 2 (two) times daily. ) 180 capsule 3 2/10/2020 at Unknown time    omeprazole (PRILOSEC) 40 MG capsule TAKE 1 CAPSULE BY MOUTH EVERY DAY 90 capsule 3 2/10/2020 at Unknown time    alirocumab (PRALUENT PEN) 75 mg/mL PnIj Inject 1 mL (75 mg total) into the skin every 14 (fourteen) days. 6 Syringe 3 Taking    bisacodyl (DULCOLAX) 5 mg EC tablet Take 5 mg by mouth once daily.   Taking    loratadine (CLARITIN) 10 mg tablet Take 10 mg by mouth nightly.   Taking    ondansetron (ZOFRAN) 4 MG tablet Take 1 tablet (4 mg total) by mouth every 6 (six) hours as needed for Nausea. 30 tablet 1 Taking    sertraline (ZOLOFT) 50 MG tablet TAKE 1 TABLET (50 MG TOTAL) BY MOUTH ONCE DAILY. 90 tablet 3 Taking    traMADol (ULTRAM) 50 mg tablet Take 1 tablet (50 mg total) by mouth 2 (two) times daily as needed for Pain. 60 tablet 3 Taking    vitamin D 1000 units Tab Take 185 mg by mouth nightly.    Taking    [DISCONTINUED] ondansetron (ZOFRAN-ODT) 8 MG TbDL Take 1 tablet (8 mg total) by mouth every 8 (eight) hours as needed (nausea/vomiting). 60 tablet 1 Taking       Lipitor [atorvastatin]     Past Medical History:   Diagnosis Date    Acute respiratory failure with hypoxia 4/30/2019    FUENTES (acute kidney injury) 5/1/2019    Allergy     Anemia     Aortic aneurysm     Breast cancer 10/2011    left breast Stage 0 DCIS    Chronic diastolic congestive heart failure 11/6/2015    Colon polyp     GERD (gastroesophageal reflux disease)     Hiatal hernia     History of colonic polyps     Hx of psychiatric care     Zoloft    HX: breast cancer     Hyperlipemia     Hypertension     ICH  (intracerebral hemorrhage)     Major depressive disorder, single episode, mild 6/23/2016    Nuclear sclerosis 7/21/2014    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 7/21/2014    PAD (peripheral artery disease) 11/6/2015    Psychiatric problem     Statin-induced rhabdomyolysis     4/2015     Stroke 4/2011    Type 2 diabetes mellitus without complication, without long-term current use of insulin 2/10/2020    Walker as ambulation aid      Past Surgical History:   Procedure Laterality Date    APPENDECTOMY      BONE MARROW BIOPSY Left 10/3/2019    Procedure: Biopsy-bone marrow;  Surgeon: Jere Tineo MD;  [facility];  Service: Oncology;  Laterality: Left;    BREAST BIOPSY Left 10/2011    BREAST LUMPECTOMY Left 2011    DCIS    COLONOSCOPY      COLONOSCOPY N/A 1/9/2020    Procedure: COLONOSCOPY;  Surgeon: Quang De La Cruz MD;  Location: [facility] (4TH FLR);  Service: Endoscopy;  Laterality: N/A;    CYST REMOVAL      back    ESOPHAGOGASTRODUODENOSCOPY  07/2016    duodenal angioectasia    ESOPHAGOGASTRODUODENOSCOPY N/A 1/9/2020    Procedure: EGD (ESOPHAGOGASTRODUODENOSCOPY);  Surgeon: Quang De La Cruz MD;  Location: [facility] (4TH FLR);  Service: Endoscopy;  Laterality: N/A;    FOOT FRACTURE SURGERY  10/2012    right foot, with R hallux valgus repair    FRACTURE SURGERY Right     broken toe repiar    HEMORRHOID SURGERY      LIVER BIOPSY  04/2015    essentially normal, no fibrosis    TUBAL LIGATION      UPPER GASTROINTESTINAL ENDOSCOPY       Family History     Problem Relation (Age of Onset)    Breast cancer Sister (62)    Cancer Sister (63), Father    Fibroids Daughter    Hypertension Daughter, Son, Maternal Grandmother    No Known Problems Sister, Mother, Brother, Brother, Maternal Aunt, Maternal Uncle, Paternal Aunt, Paternal Uncle, Maternal Grandfather, Paternal Grandmother, Paternal Grandfather        Tobacco Use    Smoking status: Former Smoker      Packs/day: 0.50     Years: 20.00     Pack years: 10.00     Types: Cigarettes     Last attempt to quit: 2011     Years since quittin.8    Smokeless tobacco: Never Used   Substance and Sexual Activity    Alcohol use: Not Currently     Comment: none since 2020, very rare wine     Drug use: No    Sexual activity: Yes     Partners: Male     Birth control/protection: Post-menopausal       Review of Systems   Constitutional: Negative for activity change, appetite change, chills, diaphoresis, fatigue, fever and unexpected weight change.   HENT: Negative for congestion, dental problem, mouth sores, nosebleeds, postnasal drip, rhinorrhea, sinus pressure, sore throat and trouble swallowing.    Eyes: Negative for photophobia and visual disturbance.   Respiratory: Negative for cough and shortness of breath.    Cardiovascular: Negative for chest pain, palpitations and leg swelling.   Gastrointestinal: Negative for abdominal distention, abdominal pain, blood in stool, constipation, diarrhea, nausea and vomiting.   Genitourinary: Negative for difficulty urinating, dysuria, frequency, hematuria, menstrual problem, urgency and vaginal bleeding.   Musculoskeletal: Negative for arthralgias, back pain and myalgias.   Skin: Negative for pallor and rash.   Allergic/Immunologic: Negative for immunocompromised state.   Neurological: Positive for numbness. Negative for dizziness, syncope, weakness and headaches.        Chronic neuropathy   Hematological: Negative for adenopathy. Does not bruise/bleed easily.   Psychiatric/Behavioral: Negative for confusion. The patient is not nervous/anxious.      Objective:     Vital Signs (Most Recent):    Vital Signs (24h Range):  Temp:  [98.2 °F (36.8 °C)] 98.2 °F (36.8 °C)  Pulse:  [70] 70  Resp:  [16] 16  SpO2:  [95 %] 95 %  BP: (131)/(76) 131/76        There is no height or weight on file to calculate BMI.  There is no height or weight on file to calculate BSA.    ECOG SCORE          KPS 80%    Lines/Drains/Airways     Central Venous Catheter Line     Name Duration         Hemodialysis Catheter 02/03/20 0904 right internal jugular 7 days                Physical Exam   Constitutional: She is oriented to person, place, and time. She appears well-developed and well-nourished. No distress.   HENT:   Mouth/Throat: Oropharynx is clear and moist. No oropharyngeal exudate or posterior oropharyngeal erythema.   Eyes: Pupils are equal, round, and reactive to light. Conjunctivae are normal. No scleral icterus.   Neck: Normal range of motion. Neck supple. No thyromegaly present.   Cardiovascular: Normal rate, regular rhythm, normal heart sounds and intact distal pulses.   Pulmonary/Chest: Effort normal and breath sounds normal. No respiratory distress.   Abdominal: Soft. Bowel sounds are normal. She exhibits no distension. There is no splenomegaly or hepatomegaly. There is no tenderness.   Musculoskeletal: Normal range of motion. She exhibits no edema or tenderness.   Neurological: She is alert and oriented to person, place, and time. No cranial nerve deficit. Coordination normal.   Skin: No rash noted. No cyanosis. No pallor. Nails show no clubbing.   Vascath intact to right CW, no redness or drainage   Psychiatric: She has a normal mood and affect. Thought content normal.   Vitals reviewed.      Significant Labs:   CBC:   Recent Labs   Lab 02/10/20  1522   WBC 2.03*   HGB 10.5*   HCT 35.3*   *    and CMP:   Recent Labs   Lab 02/10/20  1522      K 3.6      CO2 31*   GLU 92   BUN 16   CREATININE 1.0   CALCIUM 9.6   PROT 7.5   ALBUMIN 3.9   BILITOT 0.3   ALKPHOS 114   AST 17   ALT 12   ANIONGAP 8   EGFRNONAA 57.2*       Diagnostic Results:  None    Assessment/Plan:     * Stem cell transplant candidate  -Today is Day -2  -will receive melphalan 140mg/m2 tomorrow  -plan for stem cell transplant on Wednesday 2/12     Regimen as below:  Transplant Day -2: IV fluids  Transplant Day -1  through Day 0: Antiemetics   Transplant Day -1: Melphalan   Transplant Day 0: Stem Cell Infusion   Transplant Day +7: Growth factor begins    Multiple myeloma in remission  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) High Risk disease; complex karyotype by CG; , t(14;20), monosomy 13 on FISH  -She has achieved and tolerated well VRd x completing 7, 28 day cycles and achieving stringent CR as of Dec 2019 marrow/biochemical studies   -adequate response to proceed with autoSCT  -she has PS 1-2 and multiple abnormalities in her pre transplant eval that have been cleared by pulmonology, cardiology, gynecology and are not prohibitive to proceed with transplant  -she has JESSE - sp  one dose of iv feraheme with response  will need reassessment by GI; if colonosocpy normal  proceed with transplant   -no further velcade maintenance pre transplant, last dose was 12/24/19  -plan for reduced dose clifton 140mg/m2 in light of age       Type 2 diabetes mellitus without complication, without long-term current use of insulin  -monitor closely while inpatient; not on active medication    Essential hypertension  -continue norvasc    Drug-induced polyneuropathy  -pt has had numbness/tingling to hands and BUE tremors post CVA, for which she is on gabapentin 600 mg BID  - prn tramadol       Pancytopenia  -hx of JESSE; received single dose feraheme pretransplant  -colonoscopy okay for transplant  -will monitor daily CBC inpatient  -transfuse for Hgb <7, Plt <10K    Interstitial lung disease  - had abnormal PFTs during transplant evaluation 10/2019; seen by pulm; no significant desaturation on 6MWT; okay per transplant per pulm    Recurrent major depressive disorder, in full remission  -continue zoloft    Liver mass  -vascular per recent MRI in 09/2019 with no changes; seen by hepatology pre-transplant and signed off; recommend repeat MRI in 6 months    History of CVA with residual deficit  -one  in 2008, one in 2011  -has been cleared by cardiology for transplant    Hyperlipemia  -on praluent, PCSK9 inhibitor; will hold on admit    Personal history of malignant neoplasm of breast  L breast cancer DCIS stage 0  -s/p lumpectomy and radiation, no endocrine tx due to CVA        VTE Risk Mitigation (From admission, onward)         Ordered     enoxaparin injection 40 mg  Daily      02/10/20 1738     IP VTE HIGH RISK PATIENT  Once      02/10/20 1738                Disposition: inpatient for bone marrow transplant    Kathleen Kimball NP  Bone Marrow Transplant  Hematology  [facility]

## 2020-02-12 LAB
ALBUMIN SERPL BCP-MCNC: 3.6 G/DL (ref 3.5–5.2)
ALP SERPL-CCNC: 89 U/L (ref 55–135)
ALT SERPL W/O P-5'-P-CCNC: 10 U/L (ref 10–44)
ANION GAP SERPL CALC-SCNC: 9 MMOL/L (ref 8–16)
ANISOCYTOSIS BLD QL SMEAR: SLIGHT
AST SERPL-CCNC: 12 U/L (ref 10–40)
BASOPHILS NFR BLD: 0 % (ref 0–1.9)
BILIRUB SERPL-MCNC: 0.4 MG/DL (ref 0.1–1)
BLD PROD TYP BPU: NORMAL
BLOOD UNIT EXPIRATION DATE: NORMAL
BLOOD UNIT TYPE CODE: 5100
BLOOD UNIT TYPE: NORMAL
BUN SERPL-MCNC: 12 MG/DL (ref 8–23)
CALCIUM SERPL-MCNC: 7.9 MG/DL (ref 8.7–10.5)
CHLORIDE SERPL-SCNC: 111 MMOL/L (ref 95–110)
CO2 SERPL-SCNC: 24 MMOL/L (ref 23–29)
CODING SYSTEM: NORMAL
CREAT SERPL-MCNC: 0.8 MG/DL (ref 0.5–1.4)
DIFFERENTIAL METHOD: ABNORMAL
DISPENSE STATUS: NORMAL
EOSINOPHIL NFR BLD: 0 % (ref 0–8)
ERYTHROCYTE [DISTWIDTH] IN BLOOD BY AUTOMATED COUNT: 20.3 % (ref 11.5–14.5)
EST. GFR  (AFRICAN AMERICAN): >60 ML/MIN/1.73 M^2
EST. GFR  (NON AFRICAN AMERICAN): >60 ML/MIN/1.73 M^2
GLUCOSE SERPL-MCNC: 128 MG/DL (ref 70–110)
HCT VFR BLD AUTO: 30.3 % (ref 37–48.5)
HGB BLD-MCNC: 8.9 G/DL (ref 12–16)
HYPOCHROMIA BLD QL SMEAR: ABNORMAL
IMM GRANULOCYTES # BLD AUTO: ABNORMAL K/UL (ref 0–0.04)
IMM GRANULOCYTES NFR BLD AUTO: ABNORMAL % (ref 0–0.5)
LYMPHOCYTES NFR BLD: 6 % (ref 18–48)
MAGNESIUM SERPL-MCNC: 1.8 MG/DL (ref 1.6–2.6)
MCH RBC QN AUTO: 24.4 PG (ref 27–31)
MCHC RBC AUTO-ENTMCNC: 29.4 G/DL (ref 32–36)
MCV RBC AUTO: 83 FL (ref 82–98)
MONOCYTES NFR BLD: 4 % (ref 4–15)
NEUTROPHILS NFR BLD: 90 % (ref 38–73)
NRBC BLD-RTO: 0 /100 WBC
OVALOCYTES BLD QL SMEAR: ABNORMAL
PHOSPHATE SERPL-MCNC: 2.1 MG/DL (ref 2.7–4.5)
PLATELET # BLD AUTO: 123 K/UL (ref 150–350)
PLATELET BLD QL SMEAR: ABNORMAL
PMV BLD AUTO: 10.6 FL (ref 9.2–12.9)
POIKILOCYTOSIS BLD QL SMEAR: SLIGHT
POTASSIUM SERPL-SCNC: 3.9 MMOL/L (ref 3.5–5.1)
PROT SERPL-MCNC: 6.5 G/DL (ref 6–8.4)
RBC # BLD AUTO: 3.65 M/UL (ref 4–5.4)
SODIUM SERPL-SCNC: 144 MMOL/L (ref 136–145)
UNIT NUMBER: NORMAL
WBC # BLD AUTO: 1.82 K/UL (ref 3.9–12.7)

## 2020-02-12 PROCEDURE — 63600175 PHARM REV CODE 636 W HCPCS: Mod: HCNC | Performed by: INTERNAL MEDICINE

## 2020-02-12 PROCEDURE — 84100 ASSAY OF PHOSPHORUS: CPT | Mod: HCNC

## 2020-02-12 PROCEDURE — 94799 UNLISTED PULMONARY SVC/PX: CPT | Mod: HCNC

## 2020-02-12 PROCEDURE — 80053 COMPREHEN METABOLIC PANEL: CPT | Mod: HCNC

## 2020-02-12 PROCEDURE — 38208 THAW PRESERVED STEM CELLS: CPT | Mod: HCNC

## 2020-02-12 PROCEDURE — 25000003 PHARM REV CODE 250: Mod: HCNC | Performed by: NURSE PRACTITIONER

## 2020-02-12 PROCEDURE — A9155 ARTIFICIAL SALIVA: HCPCS | Mod: HCNC | Performed by: INTERNAL MEDICINE

## 2020-02-12 PROCEDURE — 63600175 PHARM REV CODE 636 W HCPCS: Mod: HCNC | Performed by: NURSE PRACTITIONER

## 2020-02-12 PROCEDURE — 25000003 PHARM REV CODE 250: Mod: HCNC | Performed by: INTERNAL MEDICINE

## 2020-02-12 PROCEDURE — 85007 BL SMEAR W/DIFF WBC COUNT: CPT | Mod: HCNC

## 2020-02-12 PROCEDURE — 20600001 HC STEP DOWN PRIVATE ROOM: Mod: HCNC

## 2020-02-12 PROCEDURE — 94761 N-INVAS EAR/PLS OXIMETRY MLT: CPT | Mod: HCNC

## 2020-02-12 PROCEDURE — 38241 TRANSPLT AUTOL HCT/DONOR: CPT

## 2020-02-12 PROCEDURE — 99233 PR SUBSEQUENT HOSPITAL CARE,LEVL III: ICD-10-PCS | Mod: HCNC,,, | Performed by: INTERNAL MEDICINE

## 2020-02-12 PROCEDURE — 99233 SBSQ HOSP IP/OBS HIGH 50: CPT | Mod: HCNC,,, | Performed by: INTERNAL MEDICINE

## 2020-02-12 PROCEDURE — 85027 COMPLETE CBC AUTOMATED: CPT | Mod: HCNC

## 2020-02-12 PROCEDURE — 83735 ASSAY OF MAGNESIUM: CPT | Mod: HCNC

## 2020-02-12 RX ORDER — FERROUS SULFATE, DRIED 160(50) MG
1 TABLET, EXTENDED RELEASE ORAL 2 TIMES DAILY
Status: DISCONTINUED | OUTPATIENT
Start: 2020-02-12 | End: 2020-02-26 | Stop reason: HOSPADM

## 2020-02-12 RX ADMIN — Medication 400 MG: at 10:02

## 2020-02-12 RX ADMIN — LEVOFLOXACIN 500 MG: 500 TABLET, FILM COATED ORAL at 08:02

## 2020-02-12 RX ADMIN — HEPARIN SODIUM 1600 UNITS: 1000 INJECTION, SOLUTION INTRAVENOUS; SUBCUTANEOUS at 02:02

## 2020-02-12 RX ADMIN — POTASSIUM CHLORIDE AND SODIUM CHLORIDE: 900; 150 INJECTION, SOLUTION INTRAVENOUS at 02:02

## 2020-02-12 RX ADMIN — HYDROCORTISONE SODIUM SUCCINATE 250 MG: 250 INJECTION, POWDER, FOR SOLUTION INTRAMUSCULAR; INTRAVENOUS at 01:02

## 2020-02-12 RX ADMIN — GABAPENTIN 600 MG: 300 CAPSULE ORAL at 09:02

## 2020-02-12 RX ADMIN — POTASSIUM & SODIUM PHOSPHATES POWDER PACK 280-160-250 MG 1 PACKET: 280-160-250 PACK at 05:02

## 2020-02-12 RX ADMIN — POTASSIUM CHLORIDE AND SODIUM CHLORIDE: 900; 150 INJECTION, SOLUTION INTRAVENOUS at 07:02

## 2020-02-12 RX ADMIN — PANTOPRAZOLE SODIUM 40 MG: 40 TABLET, DELAYED RELEASE ORAL at 08:02

## 2020-02-12 RX ADMIN — SODIUM CHLORIDE 500 ML: 0.9 INJECTION, SOLUTION INTRAVENOUS at 02:02

## 2020-02-12 RX ADMIN — Medication 30 ML: at 05:02

## 2020-02-12 RX ADMIN — POTASSIUM & SODIUM PHOSPHATES POWDER PACK 280-160-250 MG 1 PACKET: 280-160-250 PACK at 07:02

## 2020-02-12 RX ADMIN — ENOXAPARIN SODIUM 40 MG: 100 INJECTION SUBCUTANEOUS at 05:02

## 2020-02-12 RX ADMIN — LORAZEPAM 1 MG: 2 INJECTION INTRAMUSCULAR; INTRAVENOUS at 01:02

## 2020-02-12 RX ADMIN — OYSTER SHELL CALCIUM WITH VITAMIN D 1 TABLET: 500; 200 TABLET, FILM COATED ORAL at 08:02

## 2020-02-12 RX ADMIN — POTASSIUM & SODIUM PHOSPHATES POWDER PACK 280-160-250 MG 1 PACKET: 280-160-250 PACK at 10:02

## 2020-02-12 RX ADMIN — ACYCLOVIR 800 MG: 200 CAPSULE ORAL at 09:02

## 2020-02-12 RX ADMIN — POTASSIUM & SODIUM PHOSPHATES POWDER PACK 280-160-250 MG 1 PACKET: 280-160-250 PACK at 01:02

## 2020-02-12 RX ADMIN — ONDANSETRON 16 MG: 8 TABLET, ORALLY DISINTEGRATING ORAL at 08:02

## 2020-02-12 RX ADMIN — DIPHENHYDRAMINE HYDROCHLORIDE 50 MG: 50 INJECTION, SOLUTION INTRAMUSCULAR; INTRAVENOUS at 01:02

## 2020-02-12 RX ADMIN — SERTRALINE HYDROCHLORIDE 50 MG: 50 TABLET ORAL at 08:02

## 2020-02-12 RX ADMIN — OYSTER SHELL CALCIUM WITH VITAMIN D 1 TABLET: 500; 200 TABLET, FILM COATED ORAL at 09:02

## 2020-02-12 RX ADMIN — Medication 30 ML: at 08:02

## 2020-02-12 RX ADMIN — Medication 30 ML: at 01:02

## 2020-02-12 RX ADMIN — FLUCONAZOLE 400 MG: 200 TABLET ORAL at 08:02

## 2020-02-12 RX ADMIN — GABAPENTIN 300 MG: 300 CAPSULE ORAL at 08:02

## 2020-02-12 RX ADMIN — Medication 30 ML: at 09:02

## 2020-02-12 RX ADMIN — POTASSIUM CHLORIDE AND SODIUM CHLORIDE: 900; 150 INJECTION, SOLUTION INTRAVENOUS at 09:02

## 2020-02-12 RX ADMIN — Medication 400 MG: at 07:02

## 2020-02-12 RX ADMIN — ACYCLOVIR 800 MG: 200 CAPSULE ORAL at 08:02

## 2020-02-12 NOTE — PROGRESS NOTES
[facility]  Hematology  Bone Marrow Transplant  Progress Note    Patient Name: Shelby Thompson  Admission Date: 2/10/2020  Hospital Length of Stay: 2 days  Code Status: Full Code    Subjective:     Interval History: Day 0 Verenice auto, received stem cell infusion of 2.85 x 10^6 (3 bags) without difficulty except for hypotension after which resolved with 500 cc NS bolus. Denies dizziness, headaches, nausea or diarrhea.     Objective:     Vital Signs (Most Recent):  Temp: 97.5 °F (36.4 °C) (02/12/20 1425)  Pulse: 66 (02/12/20 1425)  Resp: 18 (02/12/20 1425)  BP: 126/70 (02/12/20 1425)  SpO2: 96 % (02/12/20 1425) Vital Signs (24h Range):  Temp:  [97.4 °F (36.3 °C)-98.4 °F (36.9 °C)] 97.5 °F (36.4 °C)  Pulse:  [52-87] 66  Resp:  [16-24] 18  SpO2:  [93 %-99 %] 96 %  BP: ()/(51-97) 126/70     Weight: 73.8 kg (162 lb 11.2 oz)  Body mass index is 28.82 kg/m².  Body surface area is 1.81 meters squared.      Intake/Output - Last 3 Shifts       02/10 0700 - 02/11 0659 02/11 0700 - 02/12 0659 02/12 0700 - 02/13 0659    P.O. 600 600 480    I.V. (mL/kg) 965 (13.1) 3064.3 (41.5) 969.2 (13.1)    Blood   186    Total Intake(mL/kg) 1565 (21.3) 3664.3 (49.7) 1635.2 (22.2)    Urine (mL/kg/hr) 700 3050 (1.7) 825 (1.5)    Stool  0     Total Output 700 3050 825    Net +865 +614.3 +810.2           Urine Occurrence 1 x 1 x     Stool Occurrence  1 x           Physical Exam   Constitutional: She is oriented to person, place, and time. She appears well-developed and well-nourished. No distress.   HENT:   Mouth/Throat: Oropharynx is clear and moist. No oropharyngeal exudate or posterior oropharyngeal erythema.   Eyes: Pupils are equal, round, and reactive to light. Conjunctivae are normal. No scleral icterus.   Neck: Normal range of motion. Neck supple. No thyromegaly present.   Cardiovascular: Normal rate, regular rhythm, normal heart sounds and intact distal pulses.   Pulmonary/Chest: Effort normal and breath sounds  normal. No respiratory distress.   Abdominal: Soft. Bowel sounds are normal. She exhibits no distension. There is no splenomegaly or hepatomegaly. There is no tenderness.   Musculoskeletal: Normal range of motion. She exhibits no edema or tenderness.   Neurological: She is alert and oriented to person, place, and time. No cranial nerve deficit. Coordination normal.   Skin: No rash noted. No cyanosis. No pallor. Nails show no clubbing.   Vascath intact to right CW, no redness or drainage   Psychiatric: She has a normal mood and affect. Thought content normal.   Vitals reviewed.      Significant Labs:   CBC:   Recent Labs   Lab 02/10/20  1522 02/11/20  0256 02/12/20  0401   WBC 2.03* 2.21*  2.21* 1.82*   HGB 10.5* 9.7*  9.7* 8.9*   HCT 35.3* 32.7*  32.7* 30.3*   * 120*  120* 123*    and CMP:   Recent Labs   Lab 02/10/20  1522 02/11/20  0256 02/12/20  0401    144  144 144   K 3.6 3.7  3.7 3.9    107  107 111*   CO2 31* 26  26 24   GLU 92 103  103 128*   BUN 16 15  15 12   CREATININE 1.0 0.8  0.8 0.8   CALCIUM 9.6 8.5*  8.5* 7.9*   PROT 7.5 6.7  6.7 6.5   ALBUMIN 3.9 3.5  3.5 3.6   BILITOT 0.3 0.2  0.2 0.4   ALKPHOS 114 104  104 89   AST 17 17  17 12   ALT 12 11  11 10   ANIONGAP 8 11  11 9   EGFRNONAA 57.2* >60.0  >60.0 >60.0       Diagnostic Results:  None    Assessment/Plan:     * Stem cell transplant candidate  -Today is Day 0  -received Melphalan 140mg/m2 today without difficulty  -stem cell infusion on Wednesday 2/12, will receive 3 bags (2.85 x 10^6) CD 34 stem cells     Regimen as below:  Transplant Day -2: IV fluids  Transplant Day -1 through Day 0: Antiemetics   Transplant Day -1: Melphalan   Transplant Day 0: Stem Cell Infusion   Transplant Day +7: Growth factor begins    Multiple myeloma in remission  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) High Risk disease; complex karyotype by CG; , t(14;20), monosomy 13 on  FISH  -She has achieved and tolerated well VRd x completing 7, 28 day cycles and achieving stringent CR as of Dec 2019 marrow/biochemical studies   -adequate response to proceed with autoSCT  -she has PS 1-2 and multiple abnormalities in her pre transplant eval that have been cleared by pulmonology, cardiology, gynecology and are not prohibitive to proceed with transplant  -she has JESSE - sp  one dose of iv feraheme with response  will need reassessment by GI; if colonosocpy normal  proceed with transplant   -no further velcade maintenance pre transplant, last dose was 12/24/19  -plan for reduced dose clifton 140mg/m2 in light of age       Type 2 diabetes mellitus without complication, without long-term current use of insulin  -monitor closely while inpatient; not on active medication    Essential hypertension  -continue norvasc    Drug-induced polyneuropathy  -pt has had numbness/tingling to hands and BUE tremors post CVA, for which she is on gabapentin 600 mg BID  - prn tramadol       Pancytopenia  -hx of JESSE; received single dose feraheme pretransplant  -colonoscopy okay for transplant  -will monitor daily CBC inpatient  -transfuse for Hgb <7, Plt <10K    Interstitial lung disease  - had abnormal PFTs during transplant evaluation 10/2019; seen by pulm; no significant desaturation on 6MWT; okay per transplant per pulm    Recurrent major depressive disorder, in full remission  -continue zoloft    Liver mass  -vascular per recent MRI in 09/2019 with no changes; seen by hepatology pre-transplant and signed off; recommend repeat MRI in 6 months    History of CVA with residual deficit  -one in 2008, one in 2011  -has been cleared by cardiology for transplant    Hyperlipemia  -on praluent, PCSK9 inhibitor; will hold on admit    Personal history of malignant neoplasm of breast  L breast cancer DCIS stage 0  -s/p lumpectomy and radiation, no endocrine tx due to CVA        VTE Risk Mitigation (From admission, onward)          Ordered     heparin (porcine) injection 1,600 Units  As needed (PRN)      02/10/20 2141     enoxaparin injection 40 mg  Daily      02/10/20 1738     IP VTE HIGH RISK PATIENT  Once      02/10/20 1738                Disposition: pending count recovery post transplant    Kathleen Kimball NP  Bone Marrow Transplant  [facility]

## 2020-02-12 NOTE — SUBJECTIVE & OBJECTIVE
Subjective:     Interval History: Day 0 Verenice auto, received stem cell infusion of 2.85 x 10^6 (3 bags) without difficulty except for hypotension after which resolved with 500 cc NS bolus. Denies dizziness, headaches, nausea or diarrhea.     Objective:     Vital Signs (Most Recent):  Temp: 97.5 °F (36.4 °C) (02/12/20 1425)  Pulse: 66 (02/12/20 1425)  Resp: 18 (02/12/20 1425)  BP: 126/70 (02/12/20 1425)  SpO2: 96 % (02/12/20 1425) Vital Signs (24h Range):  Temp:  [97.4 °F (36.3 °C)-98.4 °F (36.9 °C)] 97.5 °F (36.4 °C)  Pulse:  [52-87] 66  Resp:  [16-24] 18  SpO2:  [93 %-99 %] 96 %  BP: ()/(51-97) 126/70     Weight: 73.8 kg (162 lb 11.2 oz)  Body mass index is 28.82 kg/m².  Body surface area is 1.81 meters squared.      Intake/Output - Last 3 Shifts       02/10 0700 - 02/11 0659 02/11 0700 - 02/12 0659 02/12 0700 - 02/13 0659    P.O. 600 600 480    I.V. (mL/kg) 965 (13.1) 3064.3 (41.5) 969.2 (13.1)    Blood   186    Total Intake(mL/kg) 1565 (21.3) 3664.3 (49.7) 1635.2 (22.2)    Urine (mL/kg/hr) 700 3050 (1.7) 825 (1.5)    Stool  0     Total Output 700 3050 825    Net +865 +614.3 +810.2           Urine Occurrence 1 x 1 x     Stool Occurrence  1 x           Physical Exam   Constitutional: She is oriented to person, place, and time. She appears well-developed and well-nourished. No distress.   HENT:   Mouth/Throat: Oropharynx is clear and moist. No oropharyngeal exudate or posterior oropharyngeal erythema.   Eyes: Pupils are equal, round, and reactive to light. Conjunctivae are normal. No scleral icterus.   Neck: Normal range of motion. Neck supple. No thyromegaly present.   Cardiovascular: Normal rate, regular rhythm, normal heart sounds and intact distal pulses.   Pulmonary/Chest: Effort normal and breath sounds normal. No respiratory distress.   Abdominal: Soft. Bowel sounds are normal. She exhibits no distension. There is no splenomegaly or hepatomegaly. There is no tenderness.   Musculoskeletal: Normal range  of motion. She exhibits no edema or tenderness.   Neurological: She is alert and oriented to person, place, and time. No cranial nerve deficit. Coordination normal.   Skin: No rash noted. No cyanosis. No pallor. Nails show no clubbing.   Vascath intact to right CW, no redness or drainage   Psychiatric: She has a normal mood and affect. Thought content normal.   Vitals reviewed.      Significant Labs:   CBC:   Recent Labs   Lab 02/10/20  1522 02/11/20  0256 02/12/20  0401   WBC 2.03* 2.21*  2.21* 1.82*   HGB 10.5* 9.7*  9.7* 8.9*   HCT 35.3* 32.7*  32.7* 30.3*   * 120*  120* 123*    and CMP:   Recent Labs   Lab 02/10/20  1522 02/11/20  0256 02/12/20  0401    144  144 144   K 3.6 3.7  3.7 3.9    107  107 111*   CO2 31* 26  26 24   GLU 92 103  103 128*   BUN 16 15  15 12   CREATININE 1.0 0.8  0.8 0.8   CALCIUM 9.6 8.5*  8.5* 7.9*   PROT 7.5 6.7  6.7 6.5   ALBUMIN 3.9 3.5  3.5 3.6   BILITOT 0.3 0.2  0.2 0.4   ALKPHOS 114 104  104 89   AST 17 17  17 12   ALT 12 11  11 10   ANIONGAP 8 11  11 9   EGFRNONAA 57.2* >60.0  >60.0 >60.0       Diagnostic Results:  None

## 2020-02-12 NOTE — PLAN OF CARE
Problem: Anxiety  Goal: Anxiety Reduction or Resolution  Outcome: Ongoing, Progressing  Pt nervous about upcoming transplant today.     Problem: Oral Mucositis (Oncology Care)  Goal: Improved Oral Mucous Membrane Integrity  Outcome: Ongoing, Progressing  pt concerned with getting mucositis, using saliva substitute     Problem: Infection Risk (Stem Cell/Bone Marrow Transplant)  Goal: Absence of Infection Signs/Symptoms  Outcome: Ongoing, Progressing  Afebrile. VSS     Problem: Fall Injury Risk  Goal: Absence of Fall and Fall-Related Injury  Outcome: Ongoing, Progressing   Pt uses walker to ambulate at home. Getting back and forth to bathroom with IV pole in hand per self.     Problem: Adult Inpatient Plan of Care  Goal: Plan of Care Review  Outcome: Ongoing, Progressing  Pt is day zero today. Transplant scheduled for 1pm today. IVF decreased to 75ml at 2100 last night.

## 2020-02-12 NOTE — PLAN OF CARE
Patient day 0 auto SCT; tolerated well (see note). AAOx4, VSS, afebrile, and without injury. Fall precautions maintained. Family at bedside. Walker available for patient use. Patient instructed on how to contact the nurse. Patient on room air without distress; patient on regular diet with good appetite. No complaints of pain or nausea. Oral mucosa remains intact. Saliva sub provided. Patient ambulating with stand-by to the bathroom, adequate output. IVF continued. Questions and concerns have been addressed; will continue to monitor.

## 2020-02-12 NOTE — PROGRESS NOTES
02/12/20 1410   Vitals   BP (!) 83/54   Temp src Oral   Pulse (!) 52   Resp 18   SpO2 96 %   Notified KISHOR Wang. To administer 500 ml bolus NS.

## 2020-02-12 NOTE — ASSESSMENT & PLAN NOTE
-Today is Day 0  -received Melphalan 140mg/m2 today without difficulty  -stem cell infusion on Wednesday 2/12, will receive 3 bags (2.85 x 10^6) CD 34 stem cells     Regimen as below:  Transplant Day -2: IV fluids  Transplant Day -1 through Day 0: Antiemetics   Transplant Day -1: Melphalan   Transplant Day 0: Stem Cell Infusion   Transplant Day +7: Growth factor begins

## 2020-02-13 LAB
ALBUMIN SERPL BCP-MCNC: 3.2 G/DL (ref 3.5–5.2)
ALP SERPL-CCNC: 80 U/L (ref 55–135)
ALT SERPL W/O P-5'-P-CCNC: 11 U/L (ref 10–44)
ANION GAP SERPL CALC-SCNC: 5 MMOL/L (ref 8–16)
AST SERPL-CCNC: 20 U/L (ref 10–40)
BASOPHILS # BLD AUTO: 0 K/UL (ref 0–0.2)
BASOPHILS NFR BLD: 0 % (ref 0–1.9)
BILIRUB SERPL-MCNC: 0.4 MG/DL (ref 0.1–1)
BUN SERPL-MCNC: 20 MG/DL (ref 8–23)
CALCIUM SERPL-MCNC: 8 MG/DL (ref 8.7–10.5)
CHLORIDE SERPL-SCNC: 114 MMOL/L (ref 95–110)
CO2 SERPL-SCNC: 26 MMOL/L (ref 23–29)
CREAT SERPL-MCNC: 0.8 MG/DL (ref 0.5–1.4)
DIFFERENTIAL METHOD: ABNORMAL
EOSINOPHIL # BLD AUTO: 0 K/UL (ref 0–0.5)
EOSINOPHIL NFR BLD: 0 % (ref 0–8)
ERYTHROCYTE [DISTWIDTH] IN BLOOD BY AUTOMATED COUNT: 20.9 % (ref 11.5–14.5)
EST. GFR  (AFRICAN AMERICAN): >60 ML/MIN/1.73 M^2
EST. GFR  (NON AFRICAN AMERICAN): >60 ML/MIN/1.73 M^2
GLUCOSE SERPL-MCNC: 107 MG/DL (ref 70–110)
HCT VFR BLD AUTO: 27.4 % (ref 37–48.5)
HGB BLD-MCNC: 8.2 G/DL (ref 12–16)
IMM GRANULOCYTES # BLD AUTO: 0.1 K/UL (ref 0–0.04)
IMM GRANULOCYTES NFR BLD AUTO: 1 % (ref 0–0.5)
LYMPHOCYTES # BLD AUTO: 0.1 K/UL (ref 1–4.8)
LYMPHOCYTES NFR BLD: 1.1 % (ref 18–48)
MAGNESIUM SERPL-MCNC: 2 MG/DL (ref 1.6–2.6)
MCH RBC QN AUTO: 24.8 PG (ref 27–31)
MCHC RBC AUTO-ENTMCNC: 29.9 G/DL (ref 32–36)
MCV RBC AUTO: 83 FL (ref 82–98)
MONOCYTES # BLD AUTO: 0.1 K/UL (ref 0.3–1)
MONOCYTES NFR BLD: 1.4 % (ref 4–15)
NEUTROPHILS # BLD AUTO: 9.4 K/UL (ref 1.8–7.7)
NEUTROPHILS NFR BLD: 96.5 % (ref 38–73)
NRBC BLD-RTO: 0 /100 WBC
PHOSPHATE SERPL-MCNC: 3.2 MG/DL (ref 2.7–4.5)
PLATELET # BLD AUTO: 137 K/UL (ref 150–350)
PMV BLD AUTO: 10.7 FL (ref 9.2–12.9)
POTASSIUM SERPL-SCNC: 3.9 MMOL/L (ref 3.5–5.1)
PROT SERPL-MCNC: 5.8 G/DL (ref 6–8.4)
RBC # BLD AUTO: 3.31 M/UL (ref 4–5.4)
SODIUM SERPL-SCNC: 145 MMOL/L (ref 136–145)
WBC # BLD AUTO: 9.75 K/UL (ref 3.9–12.7)

## 2020-02-13 PROCEDURE — 99233 SBSQ HOSP IP/OBS HIGH 50: CPT | Mod: HCNC,,, | Performed by: INTERNAL MEDICINE

## 2020-02-13 PROCEDURE — 20600001 HC STEP DOWN PRIVATE ROOM: Mod: HCNC

## 2020-02-13 PROCEDURE — 99233 PR SUBSEQUENT HOSPITAL CARE,LEVL III: ICD-10-PCS | Mod: HCNC,,, | Performed by: INTERNAL MEDICINE

## 2020-02-13 PROCEDURE — 25000003 PHARM REV CODE 250: Mod: HCNC | Performed by: NURSE PRACTITIONER

## 2020-02-13 PROCEDURE — 63600175 PHARM REV CODE 636 W HCPCS: Mod: HCNC | Performed by: NURSE PRACTITIONER

## 2020-02-13 PROCEDURE — 83735 ASSAY OF MAGNESIUM: CPT | Mod: HCNC

## 2020-02-13 PROCEDURE — 84100 ASSAY OF PHOSPHORUS: CPT | Mod: HCNC

## 2020-02-13 PROCEDURE — 25000003 PHARM REV CODE 250: Mod: HCNC | Performed by: INTERNAL MEDICINE

## 2020-02-13 PROCEDURE — A9155 ARTIFICIAL SALIVA: HCPCS | Mod: HCNC | Performed by: INTERNAL MEDICINE

## 2020-02-13 PROCEDURE — 80053 COMPREHEN METABOLIC PANEL: CPT | Mod: HCNC

## 2020-02-13 PROCEDURE — 85025 COMPLETE CBC W/AUTO DIFF WBC: CPT | Mod: HCNC

## 2020-02-13 PROCEDURE — 63600175 PHARM REV CODE 636 W HCPCS: Mod: HCNC | Performed by: INTERNAL MEDICINE

## 2020-02-13 RX ADMIN — ALUMINUM HYDROXIDE, MAGNESIUM HYDROXIDE, AND SIMETHICONE 30 ML: 200; 200; 20 SUSPENSION ORAL at 10:02

## 2020-02-13 RX ADMIN — OYSTER SHELL CALCIUM WITH VITAMIN D 1 TABLET: 500; 200 TABLET, FILM COATED ORAL at 08:02

## 2020-02-13 RX ADMIN — ACYCLOVIR 800 MG: 200 CAPSULE ORAL at 08:02

## 2020-02-13 RX ADMIN — LEVOFLOXACIN 500 MG: 500 TABLET, FILM COATED ORAL at 08:02

## 2020-02-13 RX ADMIN — AMLODIPINE BESYLATE 5 MG: 5 TABLET ORAL at 08:02

## 2020-02-13 RX ADMIN — GABAPENTIN 300 MG: 300 CAPSULE ORAL at 08:02

## 2020-02-13 RX ADMIN — Medication 30 ML: at 05:02

## 2020-02-13 RX ADMIN — Medication 30 ML: at 08:02

## 2020-02-13 RX ADMIN — SERTRALINE HYDROCHLORIDE 50 MG: 50 TABLET ORAL at 08:02

## 2020-02-13 RX ADMIN — ENOXAPARIN SODIUM 40 MG: 100 INJECTION SUBCUTANEOUS at 05:02

## 2020-02-13 RX ADMIN — Medication 30 ML: at 12:02

## 2020-02-13 RX ADMIN — GABAPENTIN 600 MG: 300 CAPSULE ORAL at 08:02

## 2020-02-13 RX ADMIN — FLUCONAZOLE 400 MG: 200 TABLET ORAL at 08:02

## 2020-02-13 RX ADMIN — PANTOPRAZOLE SODIUM 40 MG: 40 TABLET, DELAYED RELEASE ORAL at 08:02

## 2020-02-13 NOTE — PLAN OF CARE
POC reviewed with patient; understanding verbalized. Day +1 MelAuto Tx. Pt remains independent. She voids clear, yellow urine per the hat; one BM this shift. She C/O gas pain and received PRN Maalox with full relief. Regular diet with fair appetite. Pt up to the chair for the majority of the shift.  Pt. with nonskid footwear on, bed in lowest position, and locked with bed rails up x2.  Pt. has call light and personal items within reach.  VSS and afebrile this shift. All questions and concerns addressed at this time. Will continue to monitor.

## 2020-02-13 NOTE — SUBJECTIVE & OBJECTIVE
Subjective:     Interval History: Day +1 Verenice auto, NEON, VSS, no complaints this am    Objective:     Vital Signs (Most Recent):  Temp: 98 °F (36.7 °C) (02/13/20 1206)  Pulse: 71 (02/13/20 1206)  Resp: 18 (02/13/20 1206)  BP: 117/66 (02/13/20 1206)  SpO2: 97 % (02/13/20 1206) Vital Signs (24h Range):  Temp:  [97.4 °F (36.3 °C)-98.6 °F (37 °C)] 98 °F (36.7 °C)  Pulse:  [52-81] 71  Resp:  [16-24] 18  SpO2:  [92 %-99 %] 97 %  BP: ()/(51-86) 117/66     Weight: 75.7 kg (166 lb 12.5 oz)  Body mass index is 29.54 kg/m².  Body surface area is 1.83 meters squared.      Intake/Output - Last 3 Shifts       02/11 0700 - 02/12 0659 02/12 0700 - 02/13 0659 02/13 0700 - 02/14 0659    P.O. 600 897 180    I.V. (mL/kg) 3064.3 (41.5) 2550.4 (33.7) 412 (5.4)    Blood  186     IV Piggyback  500     Total Intake(mL/kg) 3664.3 (49.7) 4133.4 (54.7) 592 (7.8)    Urine (mL/kg/hr) 3050 (1.7) 2450 (1.4) 1750 (3.4)    Stool 0 0 0    Total Output 3050 2450 1750    Net +614.3 +1683.4 -1158           Urine Occurrence 1 x 1 x     Stool Occurrence 1 x 2 x 1 x          Physical Exam   Constitutional: She is oriented to person, place, and time. She appears well-developed and well-nourished. No distress.   HENT:   Mouth/Throat: Oropharynx is clear and moist. No oropharyngeal exudate or posterior oropharyngeal erythema.   Eyes: Pupils are equal, round, and reactive to light. Conjunctivae are normal. No scleral icterus.   Neck: Normal range of motion. Neck supple. No thyromegaly present.   Cardiovascular: Normal rate, regular rhythm, normal heart sounds and intact distal pulses.   Pulmonary/Chest: Effort normal and breath sounds normal. No respiratory distress.   Abdominal: Soft. Bowel sounds are normal. She exhibits no distension. There is no splenomegaly or hepatomegaly. There is no tenderness.   Musculoskeletal: Normal range of motion. She exhibits no edema or tenderness.   Neurological: She is alert and oriented to person, place, and time. No  cranial nerve deficit. Coordination normal.   Skin: No rash noted. No cyanosis. No pallor. Nails show no clubbing.   Vascath intact to right CW, no redness or drainage   Psychiatric: She has a normal mood and affect. Thought content normal.   Vitals reviewed.      Significant Labs:   CBC:   Recent Labs   Lab 02/12/20  0401 02/13/20 0317   WBC 1.82* 9.75   HGB 8.9* 8.2*   HCT 30.3* 27.4*   * 137*    and CMP:   Recent Labs   Lab 02/12/20  0401 02/13/20 0317    145   K 3.9 3.9   * 114*   CO2 24 26   * 107   BUN 12 20   CREATININE 0.8 0.8   CALCIUM 7.9* 8.0*   PROT 6.5 5.8*   ALBUMIN 3.6 3.2*   BILITOT 0.4 0.4   ALKPHOS 89 80   AST 12 20   ALT 10 11   ANIONGAP 9 5*   EGFRNONAA >60.0 >60.0       Diagnostic Results:  None

## 2020-02-13 NOTE — ASSESSMENT & PLAN NOTE
-Today is Day +1  -received Melphalan 140mg/m2 today without difficulty  -stem cell infusion on Wednesday 2/12, received 3 bags (2.85 x 10^6) CD 34 stem cells     Regimen as below:  Transplant Day -2: IV fluids  Transplant Day -1 through Day 0: Antiemetics   Transplant Day -1: Melphalan   Transplant Day 0: Stem Cell Infusion   Transplant Day +7: Growth factor begins

## 2020-02-13 NOTE — PROGRESS NOTES
Admit Assessment    Patient Identification  Shelby Thompson   :  1950  Admit Date:  2/10/2020  Attending Provider:  Kat Rabago MD              Referral:   Pt was admitted to  with a diagnosis of Stem cell transplant candidate, and was admitted this hospital stay due to Multiple myeloma [C90.00]  Multiple myeloma [C90.00]  Multiple myeloma [C90.00].   is involved was referred to the Social Work Department via routine referral.  Patient presents as a 69 y.o. year old  female.    Persons interviewed: patient.    Living Situation:  Live with  in own home.   has his own struggles since having a heart attack.  One sister and one granddaughter have been able to assist them as they have begun to need more help.    Resides at [address] [place] [address] ,   phone: [phone] (home).      (RETIRED) Functional Status Prior  Ambulation Prior: 0-->independent  Transferrin-->independent  Toiletin-->independent    Current or Past Agencies and Description of Services/Supplies    DME  Agency Name: none  Agency Phone Number: n/a  Equipment Currently Used at Home: shower chair, rollator, grab bar    Home Health  Agency Name: none  Agency Phone Number: n/a  Services: none    IV Infusion  Agency Name: none  Agency Phone Number: n/a    Nutrition: Oral.    Outpatient Pharmacy:     [facility] #[id number] - [place], [place] - [address]  [address]  [place] [address]  Phone: [phone] Fax: [phone]    [facility] [place] [address]  Phone: [phone] Fax: [phone]      Patient Preference of agencies include none expressed.    Patient/Caregiver informed of right to choose providers or agencies.  Patient provides permission to release any necessary information to [facility] and to Non[facility] agencies as needed to facilitate patient care, treatment planning, and  patient discharge planning.  Written and verbal resources provided.      Coping   Coping well despite 's declining health.       Adjustment to Diagnosis and Treatment  Adequate.    Emotional/Behavioral/Cognitive Issues   MDD in full remission; compliant with Zoloft 50mg daily.         History/Current Symptoms of Anxiety/Depression: Yes (Hx as above)  History/Current Substance Use:   Social History     Tobacco Use    Smoking status: Former Smoker     Packs/day: 0.50     Years: 20.00     Pack years: 10.00     Types: Cigarettes     Last attempt to quit: 2011     Years since quittin.8    Smokeless tobacco: Never Used   Substance and Sexual Activity    Alcohol use: Not Currently     Comment: none since 2020, very rare wine     Drug use: No    Sexual activity: Yes     Partners: Male     Birth control/protection: Post-menopausal       Indications of Abuse/Neglect: No:   Abuse Screen (yes response referral indicated)  Feels Unsafe at Home or Work/School: no  Feels Threatened by Someone: no    Financial:  Payor/Plan Subscr  Sex Relation Sub. Ins. ID Effective Group Num   1. [employer] MANAGE* TOMMYASMARISOL MARLEY 1950 Female  [id number] 10/1/13 [id number]                                    [address] [phone]                            Other identified concerns/needs:  Hopeful that additional assistance at home might be available through home health.  Requests any available assistance with meals.    Plan: return home to care of family with additional support TBD.    Interventions/Referrals: Pending referral to meals on wheels, [employer] meal assistance.  Other needs TBD.    Patient/caregiver engaged in treatment planning process.     providing psychosocial and supportive counseling, resources, education, assistance and discharge planning as appropriate.  Patient/caregiver state understanding of  available resources,  following, remains available.  Given SWerad  contact info and encouraged to call with any needs or concerns.

## 2020-02-13 NOTE — PLAN OF CARE
Problem: Adult Inpatient Plan of Care  Goal: Plan of Care Review  Outcome: Ongoing, Progressing  Pt is day +1 post transplant. No acute events overnight. IVF continued. VSS

## 2020-02-13 NOTE — PROGRESS NOTES
[facility]  Hematology  Bone Marrow Transplant  Progress Note    Patient Name: Shelby Thompson  Admission Date: 2/10/2020  Hospital Length of Stay: 3 days  Code Status: Full Code    Subjective:     Interval History: Day +1 Verenice auto, NEON, VSS, no complaints this am    Objective:     Vital Signs (Most Recent):  Temp: 98 °F (36.7 °C) (02/13/20 1206)  Pulse: 71 (02/13/20 1206)  Resp: 18 (02/13/20 1206)  BP: 117/66 (02/13/20 1206)  SpO2: 97 % (02/13/20 1206) Vital Signs (24h Range):  Temp:  [97.4 °F (36.3 °C)-98.6 °F (37 °C)] 98 °F (36.7 °C)  Pulse:  [52-81] 71  Resp:  [16-24] 18  SpO2:  [92 %-99 %] 97 %  BP: ()/(51-86) 117/66     Weight: 75.7 kg (166 lb 12.5 oz)  Body mass index is 29.54 kg/m².  Body surface area is 1.83 meters squared.      Intake/Output - Last 3 Shifts       02/11 0700 - 02/12 0659 02/12 0700 - 02/13 0659 02/13 0700 - 02/14 0659    P.O. 600 897 180    I.V. (mL/kg) 3064.3 (41.5) 2550.4 (33.7) 412 (5.4)    Blood  186     IV Piggyback  500     Total Intake(mL/kg) 3664.3 (49.7) 4133.4 (54.7) 592 (7.8)    Urine (mL/kg/hr) 3050 (1.7) 2450 (1.4) 1750 (3.4)    Stool 0 0 0    Total Output 3050 2450 1750    Net +614.3 +1683.4 -1158           Urine Occurrence 1 x 1 x     Stool Occurrence 1 x 2 x 1 x          Physical Exam   Constitutional: She is oriented to person, place, and time. She appears well-developed and well-nourished. No distress.   HENT:   Mouth/Throat: Oropharynx is clear and moist. No oropharyngeal exudate or posterior oropharyngeal erythema.   Eyes: Pupils are equal, round, and reactive to light. Conjunctivae are normal. No scleral icterus.   Neck: Normal range of motion. Neck supple. No thyromegaly present.   Cardiovascular: Normal rate, regular rhythm, normal heart sounds and intact distal pulses.   Pulmonary/Chest: Effort normal and breath sounds normal. No respiratory distress.   Abdominal: Soft. Bowel sounds are normal. She exhibits no distension. There is no  splenomegaly or hepatomegaly. There is no tenderness.   Musculoskeletal: Normal range of motion. She exhibits no edema or tenderness.   Neurological: She is alert and oriented to person, place, and time. No cranial nerve deficit. Coordination normal.   Skin: No rash noted. No cyanosis. No pallor. Nails show no clubbing.   Vascath intact to right CW, no redness or drainage   Psychiatric: She has a normal mood and affect. Thought content normal.   Vitals reviewed.      Significant Labs:   CBC:   Recent Labs   Lab 02/12/20 0401 02/13/20 0317   WBC 1.82* 9.75   HGB 8.9* 8.2*   HCT 30.3* 27.4*   * 137*    and CMP:   Recent Labs   Lab 02/12/20 0401 02/13/20 0317    145   K 3.9 3.9   * 114*   CO2 24 26   * 107   BUN 12 20   CREATININE 0.8 0.8   CALCIUM 7.9* 8.0*   PROT 6.5 5.8*   ALBUMIN 3.6 3.2*   BILITOT 0.4 0.4   ALKPHOS 89 80   AST 12 20   ALT 10 11   ANIONGAP 9 5*   EGFRNONAA >60.0 >60.0       Diagnostic Results:  None    Assessment/Plan:     * Stem cell transplant candidate  -Today is Day +1  -received Melphalan 140mg/m2 today without difficulty  -stem cell infusion on Wednesday 2/12, received 3 bags (2.85 x 10^6) CD 34 stem cells     Regimen as below:  Transplant Day -2: IV fluids  Transplant Day -1 through Day 0: Antiemetics   Transplant Day -1: Melphalan   Transplant Day 0: Stem Cell Infusion   Transplant Day +7: Growth factor begins    Multiple myeloma in remission  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) High Risk disease; complex karyotype by CG; , t(14;20), monosomy 13 on FISH  -She has achieved and tolerated well VRd x completing 7, 28 day cycles and achieving stringent CR as of Dec 2019 marrow/biochemical studies   -adequate response to proceed with autoSCT  -she has PS 1-2 and multiple abnormalities in her pre transplant eval that have been cleared by pulmonology, cardiology, gynecology and are not prohibitive to proceed  with transplant  -she has JESSE - sp  one dose of iv feraheme with response  will need reassessment by GI; if colonosocpy normal  proceed with transplant   -no further velcade maintenance pre transplant, last dose was 12/24/19  -plan for reduced dose clifton 140mg/m2 in light of age       Type 2 diabetes mellitus without complication, without long-term current use of insulin  -monitor closely while inpatient; not on active medication    Essential hypertension  -continue norvasc    Drug-induced polyneuropathy  -pt has had numbness/tingling to hands and BUE tremors post CVA, for which she is on gabapentin 600 mg BID  - prn tramadol       Pancytopenia  -hx of JESSE; received single dose feraheme pretransplant  -colonoscopy okay for transplant  -will monitor daily CBC inpatient  -transfuse for Hgb <7, Plt <10K    Interstitial lung disease  - had abnormal PFTs during transplant evaluation 10/2019; seen by pulm; no significant desaturation on 6MWT; okay per transplant per pulm    Recurrent major depressive disorder, in full remission  -continue zoloft    Liver mass  -vascular per recent MRI in 09/2019 with no changes; seen by hepatology pre-transplant and signed off; recommend repeat MRI in 6 months    History of CVA with residual deficit  -one in 2008, one in 2011  -has been cleared by cardiology for transplant    Hyperlipemia  -on praluent, PCSK9 inhibitor; will hold on admit    Personal history of malignant neoplasm of breast  L breast cancer DCIS stage 0  -s/p lumpectomy and radiation, no endocrine tx due to CVA        VTE Risk Mitigation (From admission, onward)         Ordered     heparin (porcine) injection 1,600 Units  As needed (PRN)      02/10/20 2141     enoxaparin injection 40 mg  Daily      02/10/20 1738     IP VTE HIGH RISK PATIENT  Once      02/10/20 1738                Disposition: pending recovery post transplant    Kathleen Kimball NP  Bone Marrow Transplant  [facility]

## 2020-02-14 PROBLEM — T45.1X5A PANCYTOPENIA DUE TO ANTINEOPLASTIC CHEMOTHERAPY: Status: ACTIVE | Noted: 2020-02-10

## 2020-02-14 PROBLEM — D61.810 PANCYTOPENIA DUE TO ANTINEOPLASTIC CHEMOTHERAPY: Status: ACTIVE | Noted: 2020-02-10

## 2020-02-14 LAB
ABO + RH BLD: NORMAL
ALBUMIN SERPL BCP-MCNC: 3.2 G/DL (ref 3.5–5.2)
ALP SERPL-CCNC: 72 U/L (ref 55–135)
ALT SERPL W/O P-5'-P-CCNC: 9 U/L (ref 10–44)
ANION GAP SERPL CALC-SCNC: 5 MMOL/L (ref 8–16)
ANISOCYTOSIS BLD QL SMEAR: SLIGHT
AST SERPL-CCNC: 14 U/L (ref 10–40)
BASOPHILS # BLD AUTO: 0 K/UL (ref 0–0.2)
BASOPHILS NFR BLD: 0 % (ref 0–1.9)
BILIRUB SERPL-MCNC: 0.6 MG/DL (ref 0.1–1)
BLD GP AB SCN CELLS X3 SERPL QL: NORMAL
BUN SERPL-MCNC: 15 MG/DL (ref 8–23)
CALCIUM SERPL-MCNC: 8.2 MG/DL (ref 8.7–10.5)
CHLORIDE SERPL-SCNC: 110 MMOL/L (ref 95–110)
CO2 SERPL-SCNC: 27 MMOL/L (ref 23–29)
CREAT SERPL-MCNC: 0.8 MG/DL (ref 0.5–1.4)
DIFFERENTIAL METHOD: ABNORMAL
EOSINOPHIL # BLD AUTO: 0 K/UL (ref 0–0.5)
EOSINOPHIL NFR BLD: 0 % (ref 0–8)
ERYTHROCYTE [DISTWIDTH] IN BLOOD BY AUTOMATED COUNT: 21.1 % (ref 11.5–14.5)
EST. GFR  (AFRICAN AMERICAN): >60 ML/MIN/1.73 M^2
EST. GFR  (NON AFRICAN AMERICAN): >60 ML/MIN/1.73 M^2
GLUCOSE SERPL-MCNC: 88 MG/DL (ref 70–110)
HCT VFR BLD AUTO: 30.6 % (ref 37–48.5)
HGB BLD-MCNC: 9 G/DL (ref 12–16)
IMM GRANULOCYTES # BLD AUTO: 0.01 K/UL (ref 0–0.04)
IMM GRANULOCYTES NFR BLD AUTO: 0.7 % (ref 0–0.5)
LYMPHOCYTES # BLD AUTO: 0.1 K/UL (ref 1–4.8)
LYMPHOCYTES NFR BLD: 5 % (ref 18–48)
MAGNESIUM SERPL-MCNC: 2.2 MG/DL (ref 1.6–2.6)
MCH RBC QN AUTO: 24.5 PG (ref 27–31)
MCHC RBC AUTO-ENTMCNC: 29.4 G/DL (ref 32–36)
MCV RBC AUTO: 83 FL (ref 82–98)
MONOCYTES # BLD AUTO: 0 K/UL (ref 0.3–1)
MONOCYTES NFR BLD: 2.8 % (ref 4–15)
NEUTROPHILS # BLD AUTO: 1.3 K/UL (ref 1.8–7.7)
NEUTROPHILS NFR BLD: 91.5 % (ref 38–73)
NRBC BLD-RTO: 0 /100 WBC
OVALOCYTES BLD QL SMEAR: ABNORMAL
PHOSPHATE SERPL-MCNC: 3 MG/DL (ref 2.7–4.5)
PLATELET # BLD AUTO: 154 K/UL (ref 150–350)
PMV BLD AUTO: 11.2 FL (ref 9.2–12.9)
POIKILOCYTOSIS BLD QL SMEAR: SLIGHT
POLYCHROMASIA BLD QL SMEAR: ABNORMAL
POTASSIUM SERPL-SCNC: 3.7 MMOL/L (ref 3.5–5.1)
PROT SERPL-MCNC: 5.8 G/DL (ref 6–8.4)
RBC # BLD AUTO: 3.67 M/UL (ref 4–5.4)
SODIUM SERPL-SCNC: 142 MMOL/L (ref 136–145)
WBC # BLD AUTO: 1.41 K/UL (ref 3.9–12.7)

## 2020-02-14 PROCEDURE — 63600175 PHARM REV CODE 636 W HCPCS: Mod: HCNC | Performed by: INTERNAL MEDICINE

## 2020-02-14 PROCEDURE — 25000003 PHARM REV CODE 250: Mod: HCNC | Performed by: NURSE PRACTITIONER

## 2020-02-14 PROCEDURE — 83735 ASSAY OF MAGNESIUM: CPT | Mod: HCNC

## 2020-02-14 PROCEDURE — 63600175 PHARM REV CODE 636 W HCPCS: Mod: HCNC | Performed by: NURSE PRACTITIONER

## 2020-02-14 PROCEDURE — 86850 RBC ANTIBODY SCREEN: CPT | Mod: HCNC

## 2020-02-14 PROCEDURE — 99233 SBSQ HOSP IP/OBS HIGH 50: CPT | Mod: HCNC,,, | Performed by: INTERNAL MEDICINE

## 2020-02-14 PROCEDURE — 85025 COMPLETE CBC W/AUTO DIFF WBC: CPT | Mod: HCNC

## 2020-02-14 PROCEDURE — 80053 COMPREHEN METABOLIC PANEL: CPT | Mod: HCNC

## 2020-02-14 PROCEDURE — 25000003 PHARM REV CODE 250: Mod: HCNC | Performed by: INTERNAL MEDICINE

## 2020-02-14 PROCEDURE — A9155 ARTIFICIAL SALIVA: HCPCS | Mod: HCNC | Performed by: INTERNAL MEDICINE

## 2020-02-14 PROCEDURE — 99233 PR SUBSEQUENT HOSPITAL CARE,LEVL III: ICD-10-PCS | Mod: HCNC,,, | Performed by: INTERNAL MEDICINE

## 2020-02-14 PROCEDURE — 97110 THERAPEUTIC EXERCISES: CPT | Mod: HCNC,CQ

## 2020-02-14 PROCEDURE — 84100 ASSAY OF PHOSPHORUS: CPT | Mod: HCNC

## 2020-02-14 PROCEDURE — 97116 GAIT TRAINING THERAPY: CPT | Mod: HCNC,CQ

## 2020-02-14 PROCEDURE — 20600001 HC STEP DOWN PRIVATE ROOM: Mod: HCNC

## 2020-02-14 RX ORDER — ONDANSETRON 2 MG/ML
8 INJECTION INTRAMUSCULAR; INTRAVENOUS 3 TIMES DAILY
Status: DISCONTINUED | OUTPATIENT
Start: 2020-02-14 | End: 2020-02-24

## 2020-02-14 RX ADMIN — FLUCONAZOLE 400 MG: 200 TABLET ORAL at 08:02

## 2020-02-14 RX ADMIN — LEVOFLOXACIN 500 MG: 500 TABLET, FILM COATED ORAL at 08:02

## 2020-02-14 RX ADMIN — ONDANSETRON 8 MG: 2 INJECTION INTRAMUSCULAR; INTRAVENOUS at 08:02

## 2020-02-14 RX ADMIN — GABAPENTIN 600 MG: 300 CAPSULE ORAL at 08:02

## 2020-02-14 RX ADMIN — Medication 30 ML: at 05:02

## 2020-02-14 RX ADMIN — HEPARIN SODIUM 1600 UNITS: 1000 INJECTION, SOLUTION INTRAVENOUS; SUBCUTANEOUS at 08:02

## 2020-02-14 RX ADMIN — GABAPENTIN 300 MG: 300 CAPSULE ORAL at 08:02

## 2020-02-14 RX ADMIN — ACYCLOVIR 800 MG: 200 CAPSULE ORAL at 08:02

## 2020-02-14 RX ADMIN — HEPARIN SODIUM 1600 UNITS: 1000 INJECTION, SOLUTION INTRAVENOUS; SUBCUTANEOUS at 06:02

## 2020-02-14 RX ADMIN — POTASSIUM CHLORIDE 20 MEQ: 1500 TABLET, EXTENDED RELEASE ORAL at 06:02

## 2020-02-14 RX ADMIN — Medication 30 ML: at 08:02

## 2020-02-14 RX ADMIN — HEPARIN SODIUM 1600 UNITS: 1000 INJECTION, SOLUTION INTRAVENOUS; SUBCUTANEOUS at 03:02

## 2020-02-14 RX ADMIN — PROCHLORPERAZINE EDISYLATE 10 MG: 5 INJECTION INTRAMUSCULAR; INTRAVENOUS at 10:02

## 2020-02-14 RX ADMIN — Medication 30 ML: at 12:02

## 2020-02-14 RX ADMIN — ONDANSETRON 8 MG: 2 INJECTION INTRAMUSCULAR; INTRAVENOUS at 03:02

## 2020-02-14 RX ADMIN — PANTOPRAZOLE SODIUM 40 MG: 40 TABLET, DELAYED RELEASE ORAL at 08:02

## 2020-02-14 RX ADMIN — AMLODIPINE BESYLATE 5 MG: 5 TABLET ORAL at 08:02

## 2020-02-14 RX ADMIN — SERTRALINE HYDROCHLORIDE 50 MG: 50 TABLET ORAL at 08:02

## 2020-02-14 RX ADMIN — ENOXAPARIN SODIUM 40 MG: 100 INJECTION SUBCUTANEOUS at 05:02

## 2020-02-14 RX ADMIN — OYSTER SHELL CALCIUM WITH VITAMIN D 1 TABLET: 500; 200 TABLET, FILM COATED ORAL at 08:02

## 2020-02-14 RX ADMIN — ONDANSETRON 8 MG: 8 TABLET, ORALLY DISINTEGRATING ORAL at 07:02

## 2020-02-14 RX ADMIN — HEPARIN SODIUM 1600 UNITS: 1000 INJECTION, SOLUTION INTRAVENOUS; SUBCUTANEOUS at 10:02

## 2020-02-14 NOTE — PROGRESS NOTES
"[facility]  Hematology  Bone Marrow Transplant  Progress Note    Patient Name: Shelby Thompson  Admission Date: 2/10/2020  Hospital Length of Stay: 4 days  Code Status: Full Code    Subjective:     Interval History: Day +2 Verenice auto, fatigue and decreased appetite noted today. Some "diarrhea" but not loose/watery. Nausea so scheduled zofran. Will consider IVFs tomorrow if needed    Objective:     Vital Signs (Most Recent):  Temp: 97.8 °F (36.6 °C) (02/14/20 0744)  Pulse: 65 (02/14/20 0744)  Resp: 16 (02/14/20 0744)  BP: 128/78 (02/14/20 0744)  SpO2: 97 % (02/14/20 0744) Vital Signs (24h Range):  Temp:  [97.8 °F (36.6 °C)-98.5 °F (36.9 °C)] 97.8 °F (36.6 °C)  Pulse:  [65-78] 65  Resp:  [16-18] 16  SpO2:  [91 %-97 %] 97 %  BP: ()/(54-83) 128/78     Weight: 73.7 kg (162 lb 7.7 oz)  Body mass index is 28.78 kg/m².  Body surface area is 1.81 meters squared.      Intake/Output - Last 3 Shifts       02/12 0700 - 02/13 0659 02/13 0700 - 02/14 0659 02/14 0700 - 02/15 0659    P.O. 897 570 120    I.V. (mL/kg) 2550.4 (33.7) 412 (5.6)     Blood 186      IV Piggyback 500      Total Intake(mL/kg) 4133.4 (54.7) 982 (13.3) 120 (1.6)    Urine (mL/kg/hr) 2450 (1.4) 3550 (2)     Stool 0 0     Total Output 2450 3550     Net +1683.4 -2568 +120           Urine Occurrence 1 x      Stool Occurrence 2 x 3 x           Physical Exam   Constitutional: She is oriented to person, place, and time. Vital signs are normal. She is cooperative.   HENT:   Head: Normocephalic.   Eyes: Lids are normal.   Neck: Trachea normal and normal range of motion.   Cardiovascular: Normal rate, regular rhythm, S1 normal, S2 normal and normal heart sounds.   Pulmonary/Chest: Effort normal and breath sounds normal.   Abdominal: Soft. Normal appearance and bowel sounds are normal.   Musculoskeletal: Normal range of motion.   Neurological: She is alert and oriented to person, place, and time. Coordination and gait normal.   Skin: Skin is dry " and intact. She is not diaphoretic. No pallor.   vascath to chest wall c/d/i   Psychiatric: She has a normal mood and affect. Her speech is normal and behavior is normal. Judgment and thought content normal. Cognition and memory are normal.       Significant Labs:   CBC:   Recent Labs   Lab 02/13/20 0317 02/14/20  0325   WBC 9.75 1.41*   HGB 8.2* 9.0*   HCT 27.4* 30.6*   * 154    and CMP:   Recent Labs   Lab 02/13/20 0317 02/14/20  0325    142   K 3.9 3.7   * 110   CO2 26 27    88   BUN 20 15   CREATININE 0.8 0.8   CALCIUM 8.0* 8.2*   PROT 5.8* 5.8*   ALBUMIN 3.2* 3.2*   BILITOT 0.4 0.6   ALKPHOS 80 72   AST 20 14   ALT 11 9*   ANIONGAP 5* 5*   EGFRNONAA >60.0 >60.0       Diagnostic Results:  I have reviewed all pertinent imaging results/findings within the past 24 hours.    Assessment/Plan:     * Status post autologous bone marrow transplant  -Today is day +2  -received Melphalan 140mg/m2 without difficulty  -stem cell infusion was on 2/12 without issue, received 3 bags (2.85 x 10^6) CD 34 stem cells  -some expected fatigue, poor appetite, not watery diarrhea, and nausea. Scheduled zofran and consider IVFs tomorrow if PO intake has decreased    Regimen as below:  Transplant Day -2: IV fluids  Transplant Day -1 through Day 0: Antiemetics   Transplant Day -1: Melphalan   Transplant Day 0: Stem Cell Infusion   Transplant Day +7: Growth factor begins    Multiple myeloma in remission  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) High Risk disease; complex karyotype by CG; , t(14;20), monosomy 13 on FISH  -She has achieved and tolerated well VRd x completing 7, 28 day cycles and achieving stringent CR as of Dec 2019 marrow/biochemical studies   -adequate response to proceed with autoSCT  -she has PS 1-2 and multiple abnormalities in her pre transplant eval that have been cleared by pulmonology, cardiology, gynecology and are not prohibitive to  proceed with transplant  -she has JESSE - sp  one dose of iv feraheme with response  will need reassessment by GI; if colonosocpy normal  proceed with transplant   -no further velcade maintenance pre transplant, last dose was 12/24/19  -plan for reduced dose clifton 140mg/m2 in light of age    Pancytopenia due to antineoplastic chemotherapy  -hx of JESSE; received single dose feraheme pre transplant  -colonoscopy okay for transplant  -will monitor daily CBC inpatient  -transfuse for Hgb <7, Plt <10K or bleeding  -wbc 1.41, hgb 9.0, plt normal today at 154K    Type 2 diabetes mellitus without complication, without long-term current use of insulin  -monitor closely while inpatient; not on active medication     Essential hypertension  -continue norvasc     Drug-induced polyneuropathy  -pt has had numbness/tingling to hands and BUE tremors post CVA, for which she is on gabapentin 600 mg BID  - prn tramadol     Interstitial lung disease  - had abnormal PFTs during transplant evaluation 10/2019; seen by pulm; no significant desaturation on 6MWT; okay per transplant per pulm     Recurrent major depressive disorder, in full remission  -continue zoloft     Liver mass  -vascular per recent MRI in 09/2019 with no changes; seen by hepatology pre-transplant and signed off; recommend repeat MRI in 6 months     History of CVA with residual deficit  -one in 2008, one in 2011  -has been cleared by cardiology for transplant     Hyperlipemia  -on praluent, PCSK9 inhibitor; will hold on admit     Personal history of malignant neoplasm of breast  L breast cancer DCIS stage 0  -s/p lumpectomy and radiation, no endocrine tx due to CVA       VTE Risk Mitigation (From admission, onward)         Ordered     heparin (porcine) injection 1,600 Units  As needed (PRN)      02/10/20 2141     enoxaparin injection 40 mg  Daily      02/10/20 1738     IP VTE HIGH RISK PATIENT  Once      02/10/20 1738                Disposition: remains on inpatient bmt  service until count recovery    Juli Flores NP  Bone Marrow Transplant  [facility]

## 2020-02-14 NOTE — ASSESSMENT & PLAN NOTE
-hx of JESSE; received single dose feraheme pre transplant  -colonoscopy okay for transplant  -will monitor daily CBC inpatient  -transfuse for Hgb <7, Plt <10K or bleeding  -wbc 1.41, hgb 9.0, plt normal today at 154K

## 2020-02-14 NOTE — ASSESSMENT & PLAN NOTE
-Today is day +2  -received Melphalan 140mg/m2 without difficulty  -stem cell infusion was on 2/12 without issue, received 3 bags (2.85 x 10^6) CD 34 stem cells     Regimen as below:  Transplant Day -2: IV fluids  Transplant Day -1 through Day 0: Antiemetics   Transplant Day -1: Melphalan   Transplant Day 0: Stem Cell Infusion   Transplant Day +7: Growth factor begins

## 2020-02-14 NOTE — PLAN OF CARE
Patient day +2 auto SCT. AAOx4, VSS, afebrile, and without injury. Fall precautions maintained. Patient instructed on how to contact the nurse. Patient on room air without distress; patient on regular diet with poor appetite due to nausea. No complaints of pain. Complaints of nausea addressed with PRN and scheduled zofran. Oral mucosa remains intact, saliva sub provided. Patient worked with PT, ambulating in the martin. Independently ambulating to the restroom; adequate output. Questions and concerns have been addressed; will continue to monitor.

## 2020-02-14 NOTE — SUBJECTIVE & OBJECTIVE
"  Subjective:     Interval History: Day +2 Verenice auto, fatigue and decreased appetite noted today. Some "diarrhea" but not loose/watery.     Objective:     Vital Signs (Most Recent):  Temp: 97.8 °F (36.6 °C) (02/14/20 0744)  Pulse: 65 (02/14/20 0744)  Resp: 16 (02/14/20 0744)  BP: 128/78 (02/14/20 0744)  SpO2: 97 % (02/14/20 0744) Vital Signs (24h Range):  Temp:  [97.8 °F (36.6 °C)-98.5 °F (36.9 °C)] 97.8 °F (36.6 °C)  Pulse:  [65-78] 65  Resp:  [16-18] 16  SpO2:  [91 %-97 %] 97 %  BP: ()/(54-83) 128/78     Weight: 73.7 kg (162 lb 7.7 oz)  Body mass index is 28.78 kg/m².  Body surface area is 1.81 meters squared.      Intake/Output - Last 3 Shifts       02/12 0700 - 02/13 0659 02/13 0700 - 02/14 0659 02/14 0700 - 02/15 0659    P.O. 897 570 120    I.V. (mL/kg) 2550.4 (33.7) 412 (5.6)     Blood 186      IV Piggyback 500      Total Intake(mL/kg) 4133.4 (54.7) 982 (13.3) 120 (1.6)    Urine (mL/kg/hr) 2450 (1.4) 3550 (2)     Stool 0 0     Total Output 2450 3550     Net +1683.4 -2568 +120           Urine Occurrence 1 x      Stool Occurrence 2 x 3 x           Physical Exam   Constitutional: She is oriented to person, place, and time. Vital signs are normal. She is cooperative.   HENT:   Head: Normocephalic.   Eyes: Lids are normal.   Neck: Trachea normal and normal range of motion.   Cardiovascular: Normal rate, regular rhythm, S1 normal, S2 normal and normal heart sounds.   Pulmonary/Chest: Effort normal and breath sounds normal.   Abdominal: Soft. Normal appearance and bowel sounds are normal.   Musculoskeletal: Normal range of motion.   Neurological: She is alert and oriented to person, place, and time. Coordination and gait normal.   Skin: Skin is dry and intact. She is not diaphoretic. No pallor.   vascath to chest wall c/d/i   Psychiatric: She has a normal mood and affect. Her speech is normal and behavior is normal. Judgment and thought content normal. Cognition and memory are normal.       Significant Labs: "   CBC:   Recent Labs   Lab 02/13/20 0317 02/14/20  0325   WBC 9.75 1.41*   HGB 8.2* 9.0*   HCT 27.4* 30.6*   * 154    and CMP:   Recent Labs   Lab 02/13/20 0317 02/14/20  0325    142   K 3.9 3.7   * 110   CO2 26 27    88   BUN 20 15   CREATININE 0.8 0.8   CALCIUM 8.0* 8.2*   PROT 5.8* 5.8*   ALBUMIN 3.2* 3.2*   BILITOT 0.4 0.6   ALKPHOS 80 72   AST 20 14   ALT 11 9*   ANIONGAP 5* 5*   EGFRNONAA >60.0 >60.0       Diagnostic Results:  I have reviewed all pertinent imaging results/findings within the past 24 hours.

## 2020-02-14 NOTE — PLAN OF CARE
Problem: Physical Therapy Goal  Goal: Physical Therapy Goal  Description  Goals to be met by: 2020     Patient will increase functional independence with mobility by performin. Bed to chair transfer with Supervision using Rolling Walker  2. Gait  x 150 feet with Supervision using Rolling Walker.   3. Ascend/descend 3 stair with bilateral Handrails Stand-by Assistance using no AD.   4. Lower extremity exercise program x20 reps per handout, with independence     Outcome: Ongoing, Progressing   Pt progressing towards goals. continue with PT POC.Goals remain appropriate.  Lester Masters PTA

## 2020-02-14 NOTE — PT/OT/SLP PROGRESS
Physical Therapy Treatment    Patient Name:  Shelby Thompson   MRN:  [id number]    Recommendations:     Discharge Recommendations:  home health PT   Discharge Equipment Recommendations: none   Barriers to discharge: None    Assessment:     Shelby Thompson is a 69 y.o. female admitted with a medical diagnosis of Status post autologous bone marrow transplant.  She presents with the following impairments/functional limitations:  impaired endurance, impaired balance .Pt Progressing with PT Intervention. Pt Progressing with improving gait distance. Pt would continue to benefit from skilled PT to address overall functional mobility and goals. Goals remain appropriate.      Rehab Prognosis: Good; patient would benefit from acute skilled PT services to address these deficits and reach maximum level of function.    Recent Surgery: * No surgery found *      Plan:     During this hospitalization, patient to be seen 2 x/week to address the identified rehab impairments via gait training, therapeutic activities, therapeutic exercises and progress toward the following goals:    · Plan of Care Expires:  03/11/20    Subjective     Patient/Family Comments/goals: I want to try and walk  Pain/Comfort:  · Pain Rating 1: 0/10  · Pain Rating Post-Intervention 1: 0/10      Objective:     Communicated with RN prior to session.  Patient found supine with   upon PT entry to room.     General Precautions: Standard, fall   Orthopedic Precautions:N/A   Braces: N/A     Functional Mobility:  · Bed Mobility:     · Rolling Right: supervision  · Scooting: supervision  · Supine to Sit: supervision  · Transfers:     · Sit to Stand:  supervision with no AD  · Gait: pt amb with no AD with SBA for safety 150 ft x 2 with no LOB with mask and gloves on      AM-PAC 6 CLICK MOBILITY  Turning over in bed (including adjusting bedclothes, sheets and blankets)?: 4  Sitting down on and standing up from a chair with arms (e.g., wheelchair, bedside commode, etc.):  3  Moving from lying on back to sitting on the side of the bed?: 4  Moving to and from a bed to a chair (including a wheelchair)?: 3  Need to walk in hospital room?: 3  Climbing 3-5 steps with a railing?: 3  Basic Mobility Total Score: 20       Therapeutic Activities and Exercises:   educated patient on progress, safety,d/c,PT POC, on the effects of prolonged immobility and the importance of performing OOB activity and exercises to promote healing and reduce recovery time   Patient performed therex  seated in bedside chair B LE AROM AP, LAQ, Hip Flexion, Hip Abd/Add with facilitation for correct performance and sequencing. Exercises performed to develop and maintain pt's strength, endurance and flexibility.   Updated white board with appropriate PT mobility information for medical team notification  Pt encouraged to ambulate in hallways 3x/day with nursing or family assistance to improve endurance.   Pt safe to ambulate in hallway with RN or PCT assistance   Bedside table in front of patient and area set up for function, convenience, and safety. RN aware of patient's mobility needs and status. Questions/concerns addressed within PTA scope of practice; patient with no further questions. Time was provided for active listening, discussion of health disposition, and discussion of safe discharge. Pt?verbalized?agreement       Patient left up in chair with all lines intact, call button in reach and nsg present..    GOALS:   Multidisciplinary Problems     Physical Therapy Goals        Problem: Physical Therapy Goal    Goal Priority Disciplines Outcome Goal Variances Interventions   Physical Therapy Goal     PT, PT/OT Ongoing, Progressing     Description:  Goals to be met by: 2020     Patient will increase functional independence with mobility by performin. Bed to chair transfer with Supervision using Rolling Walker  2. Gait  x 150 feet with Supervision using Rolling Walker.   3. Ascend/descend 3 stair with  bilateral Handrails Stand-by Assistance using no AD.   4. Lower extremity exercise program x20 reps per handout, with independence                      Time Tracking:     PT Received On: 02/14/20  PT Start Time: 1030     PT Stop Time: 1054  PT Total Time (min): 24 min     Billable Minutes: Gait Training 16 and Therapeutic Exercise 8    Treatment Type: Treatment  PT/PTA: PTA     PTA Visit Number: 1     Lester Masters, PTA  02/14/2020

## 2020-02-14 NOTE — PROGRESS NOTES
Spoke to [facility] to enroll patient in Meals on Wheels.  Informed patient of plan for agency to call Monday to schedule in-home evaluation with her .  No other needs at this time.  Will follow.

## 2020-02-14 NOTE — ASSESSMENT & PLAN NOTE
-Today is day +3  -received Melphalan 140mg/m2 without difficulty  -stem cell infusion was on 2/12 without issue, received 3 bags (2.85 x 10^6) CD 34 stem cells  -some expected fatigue, poor appetite, not watery diarrhea, and nausea. Scheduled zofran and consider IVFs tomorrow if PO intake has decreased    Regimen as below:  Transplant Day -2: IV fluids  Transplant Day -1 through Day 0: Antiemetics   Transplant Day -1: Melphalan   Transplant Day 0: Stem Cell Infusion   Transplant Day +7: Growth factor begins

## 2020-02-14 NOTE — PLAN OF CARE
Problem: Oral Mucositis (Oncology Care)  Goal: Improved Oral Mucous Membrane Integrity  Outcome: Ongoing, Progressing  Pt has no teeth. Doing her saliva substitute and frequent oral care to prevent mucositis.      Problem: Infection Risk (Stem Cell/Bone Marrow Transplant)  Goal: Absence of Infection Signs/Symptoms  Outcome: Ongoing, Progressing  Afebrile. VSS     Problem: Fall Injury Risk  Goal: Absence of Fall and Fall-Related Injury  Outcome: Ongoing, Progressing  Up ad sara. independent     Problem: Adult Inpatient Plan of Care  Goal: Plan of Care Review  Outcome: Ongoing, Progressing   Pt is day +2 post auto transplant. Vascath to right chest heplocked. Denies any pain, nausea, diarrhea

## 2020-02-15 LAB
ALBUMIN SERPL BCP-MCNC: 3.1 G/DL (ref 3.5–5.2)
ALP SERPL-CCNC: 65 U/L (ref 55–135)
ALT SERPL W/O P-5'-P-CCNC: 9 U/L (ref 10–44)
ANION GAP SERPL CALC-SCNC: 7 MMOL/L (ref 8–16)
ANISOCYTOSIS BLD QL SMEAR: SLIGHT
AST SERPL-CCNC: 14 U/L (ref 10–40)
BASOPHILS # BLD AUTO: 0 K/UL (ref 0–0.2)
BASOPHILS NFR BLD: 0 % (ref 0–1.9)
BILIRUB SERPL-MCNC: 0.7 MG/DL (ref 0.1–1)
BUN SERPL-MCNC: 13 MG/DL (ref 8–23)
CALCIUM SERPL-MCNC: 8.4 MG/DL (ref 8.7–10.5)
CHLORIDE SERPL-SCNC: 107 MMOL/L (ref 95–110)
CO2 SERPL-SCNC: 27 MMOL/L (ref 23–29)
CREAT SERPL-MCNC: 0.8 MG/DL (ref 0.5–1.4)
DIFFERENTIAL METHOD: ABNORMAL
EOSINOPHIL # BLD AUTO: 0 K/UL (ref 0–0.5)
EOSINOPHIL NFR BLD: 0 % (ref 0–8)
ERYTHROCYTE [DISTWIDTH] IN BLOOD BY AUTOMATED COUNT: 20.4 % (ref 11.5–14.5)
EST. GFR  (AFRICAN AMERICAN): >60 ML/MIN/1.73 M^2
EST. GFR  (NON AFRICAN AMERICAN): >60 ML/MIN/1.73 M^2
GLUCOSE SERPL-MCNC: 91 MG/DL (ref 70–110)
HCT VFR BLD AUTO: 30.3 % (ref 37–48.5)
HGB BLD-MCNC: 9 G/DL (ref 12–16)
HYPOCHROMIA BLD QL SMEAR: ABNORMAL
IMM GRANULOCYTES # BLD AUTO: 0 K/UL (ref 0–0.04)
IMM GRANULOCYTES NFR BLD AUTO: 0 % (ref 0–0.5)
LYMPHOCYTES # BLD AUTO: 0 K/UL (ref 1–4.8)
LYMPHOCYTES NFR BLD: 9.1 % (ref 18–48)
MAGNESIUM SERPL-MCNC: 2.2 MG/DL (ref 1.6–2.6)
MCH RBC QN AUTO: 24.5 PG (ref 27–31)
MCHC RBC AUTO-ENTMCNC: 29.7 G/DL (ref 32–36)
MCV RBC AUTO: 83 FL (ref 82–98)
MONOCYTES # BLD AUTO: 0 K/UL (ref 0.3–1)
MONOCYTES NFR BLD: 6.1 % (ref 4–15)
NEUTROPHILS # BLD AUTO: 0.3 K/UL (ref 1.8–7.7)
NEUTROPHILS NFR BLD: 84.8 % (ref 38–73)
NRBC BLD-RTO: 0 /100 WBC
OVALOCYTES BLD QL SMEAR: ABNORMAL
PHOSPHATE SERPL-MCNC: 3 MG/DL (ref 2.7–4.5)
PLATELET # BLD AUTO: 158 K/UL (ref 150–350)
PLATELET BLD QL SMEAR: ABNORMAL
PMV BLD AUTO: 11.5 FL (ref 9.2–12.9)
POIKILOCYTOSIS BLD QL SMEAR: SLIGHT
POLYCHROMASIA BLD QL SMEAR: ABNORMAL
POTASSIUM SERPL-SCNC: 3.6 MMOL/L (ref 3.5–5.1)
PROT SERPL-MCNC: 5.7 G/DL (ref 6–8.4)
RBC # BLD AUTO: 3.67 M/UL (ref 4–5.4)
SODIUM SERPL-SCNC: 141 MMOL/L (ref 136–145)
WBC # BLD AUTO: 0.33 K/UL (ref 3.9–12.7)

## 2020-02-15 PROCEDURE — 80053 COMPREHEN METABOLIC PANEL: CPT | Mod: HCNC

## 2020-02-15 PROCEDURE — 20600001 HC STEP DOWN PRIVATE ROOM: Mod: HCNC

## 2020-02-15 PROCEDURE — A9155 ARTIFICIAL SALIVA: HCPCS | Mod: HCNC | Performed by: INTERNAL MEDICINE

## 2020-02-15 PROCEDURE — 63600175 PHARM REV CODE 636 W HCPCS: Mod: HCNC | Performed by: NURSE PRACTITIONER

## 2020-02-15 PROCEDURE — 99233 SBSQ HOSP IP/OBS HIGH 50: CPT | Mod: HCNC,,, | Performed by: INTERNAL MEDICINE

## 2020-02-15 PROCEDURE — 25000003 PHARM REV CODE 250: Mod: HCNC | Performed by: INTERNAL MEDICINE

## 2020-02-15 PROCEDURE — 63600175 PHARM REV CODE 636 W HCPCS: Mod: HCNC | Performed by: INTERNAL MEDICINE

## 2020-02-15 PROCEDURE — 84100 ASSAY OF PHOSPHORUS: CPT | Mod: HCNC

## 2020-02-15 PROCEDURE — 99233 PR SUBSEQUENT HOSPITAL CARE,LEVL III: ICD-10-PCS | Mod: HCNC,,, | Performed by: INTERNAL MEDICINE

## 2020-02-15 PROCEDURE — 25000003 PHARM REV CODE 250: Mod: HCNC | Performed by: NURSE PRACTITIONER

## 2020-02-15 PROCEDURE — 85025 COMPLETE CBC W/AUTO DIFF WBC: CPT | Mod: HCNC

## 2020-02-15 PROCEDURE — 83735 ASSAY OF MAGNESIUM: CPT | Mod: HCNC

## 2020-02-15 RX ADMIN — GABAPENTIN 600 MG: 300 CAPSULE ORAL at 08:02

## 2020-02-15 RX ADMIN — ACYCLOVIR 800 MG: 200 CAPSULE ORAL at 08:02

## 2020-02-15 RX ADMIN — Medication 30 ML: at 08:02

## 2020-02-15 RX ADMIN — Medication 30 ML: at 05:02

## 2020-02-15 RX ADMIN — LEVOFLOXACIN 500 MG: 500 TABLET, FILM COATED ORAL at 08:02

## 2020-02-15 RX ADMIN — ENOXAPARIN SODIUM 40 MG: 100 INJECTION SUBCUTANEOUS at 05:02

## 2020-02-15 RX ADMIN — POTASSIUM CHLORIDE 20 MEQ: 1500 TABLET, EXTENDED RELEASE ORAL at 05:02

## 2020-02-15 RX ADMIN — GABAPENTIN 300 MG: 300 CAPSULE ORAL at 08:02

## 2020-02-15 RX ADMIN — Medication 30 ML: at 12:02

## 2020-02-15 RX ADMIN — OYSTER SHELL CALCIUM WITH VITAMIN D 1 TABLET: 500; 200 TABLET, FILM COATED ORAL at 08:02

## 2020-02-15 RX ADMIN — FLUCONAZOLE 400 MG: 200 TABLET ORAL at 08:02

## 2020-02-15 RX ADMIN — HEPARIN SODIUM 1600 UNITS: 1000 INJECTION, SOLUTION INTRAVENOUS; SUBCUTANEOUS at 08:02

## 2020-02-15 RX ADMIN — ONDANSETRON 8 MG: 2 INJECTION INTRAMUSCULAR; INTRAVENOUS at 08:02

## 2020-02-15 RX ADMIN — ONDANSETRON 8 MG: 2 INJECTION INTRAMUSCULAR; INTRAVENOUS at 03:02

## 2020-02-15 RX ADMIN — SERTRALINE HYDROCHLORIDE 50 MG: 50 TABLET ORAL at 08:02

## 2020-02-15 RX ADMIN — PANTOPRAZOLE SODIUM 40 MG: 40 TABLET, DELAYED RELEASE ORAL at 08:02

## 2020-02-15 RX ADMIN — AMLODIPINE BESYLATE 5 MG: 5 TABLET ORAL at 08:02

## 2020-02-15 NOTE — PROGRESS NOTES
[facility]  Hematology  Bone Marrow Transplant  Progress Note    Patient Name: Shelby Thompson  Admission Date: 2/10/2020  Hospital Length of Stay: 5 days  Code Status: Full Code    Subjective:     Interval History: Day +3 Verenice auto. Nausea and diarrhea are better.  Complained of mild decreased in appetite.  Vitals stable  WBC-0.33, ANC of 280, hemoglobin-9.0 and platelets 158 K    Objective:     Vital Signs (Most Recent):  Temp: 98.1 °F (36.7 °C) (02/15/20 1509)  Pulse: 78 (02/15/20 1509)  Resp: 15 (02/15/20 1509)  BP: 111/68 (02/15/20 1509)  SpO2: 98 % (02/15/20 1509) Vital Signs (24h Range):  Temp:  [97.7 °F (36.5 °C)-98.3 °F (36.8 °C)] 98.1 °F (36.7 °C)  Pulse:  [55-88] 78  Resp:  [14-17] 15  SpO2:  [92 %-99 %] 98 %  BP: (103-123)/(57-82) 111/68     Weight: 72 kg (158 lb 13.5 oz)  Body mass index is 28.14 kg/m².  Body surface area is 1.79 meters squared.      Intake/Output - Last 3 Shifts       02/13 0700 - 02/14 0659 02/14 0700 - 02/15 0659 02/15 0700 - 02/16 0659    P.O. 570 120 250    I.V. (mL/kg) 412 (5.6)      Blood       IV Piggyback       Total Intake(mL/kg) 982 (13.3) 120 (1.7) 250 (3.5)    Urine (mL/kg/hr) 3550 (2) 1450 (0.8) 550 (0.9)    Stool 0 0     Total Output 3550 1450 550    Net -2568 -3090 -300           Urine Occurrence  2 x     Stool Occurrence 3 x 3 x           Physical Exam   Constitutional: She is oriented to person, place, and time. Vital signs are normal. She is cooperative.   HENT:   Head: Normocephalic.   Eyes: Lids are normal.   Neck: Trachea normal and normal range of motion.   Cardiovascular: Normal rate, regular rhythm, S1 normal, S2 normal and normal heart sounds.   Pulmonary/Chest: Effort normal and breath sounds normal.   Abdominal: Soft. Normal appearance and bowel sounds are normal.   Musculoskeletal: Normal range of motion.   Neurological: She is alert and oriented to person, place, and time. Coordination and gait normal.   Skin: Skin is dry and intact.  She is not diaphoretic. No pallor.   vascath to chest wall c/d/i   Psychiatric: She has a normal mood and affect. Her speech is normal and behavior is normal. Judgment and thought content normal. Cognition and memory are normal.       Significant Labs:   CBC:   Recent Labs   Lab 02/14/20  0325 02/15/20  0334   WBC 1.41* 0.33*   HGB 9.0* 9.0*   HCT 30.6* 30.3*    158    and CMP:   Recent Labs   Lab 02/14/20  0325 02/15/20  0334    141   K 3.7 3.6    107   CO2 27 27   GLU 88 91   BUN 15 13   CREATININE 0.8 0.8   CALCIUM 8.2* 8.4*   PROT 5.8* 5.7*   ALBUMIN 3.2* 3.1*   BILITOT 0.6 0.7   ALKPHOS 72 65   AST 14 14   ALT 9* 9*   ANIONGAP 5* 7*   EGFRNONAA >60.0 >60.0       Diagnostic Results:  I have reviewed all pertinent imaging results/findings within the past 24 hours.    Assessment/Plan:     * Status post autologous bone marrow transplant  -Today is day +3  -received Melphalan 140mg/m2 without difficulty  -stem cell infusion was on 2/12 without issue, received 3 bags (2.85 x 10^6) CD 34 stem cells  -some expected fatigue, poor appetite, not watery diarrhea, and nausea. Scheduled zofran and consider IVFs tomorrow if PO intake has decreased    Regimen as below:  Transplant Day -2: IV fluids  Transplant Day -1 through Day 0: Antiemetics   Transplant Day -1: Melphalan   Transplant Day 0: Stem Cell Infusion   Transplant Day +7: Growth factor begins    Type 2 diabetes mellitus without complication, without long-term current use of insulin  -monitor closely while inpatient; not on active medication     Essential hypertension  -continue norvasc     Drug-induced polyneuropathy  -pt has had numbness/tingling to hands and BUE tremors post CVA, for which she is on gabapentin 600 mg BID  - prn tramadol     Pancytopenia due to antineoplastic chemotherapy  -hx of JESSE; received single dose feraheme pre transplant  -colonoscopy okay for transplant  -will monitor daily CBC inpatient  -transfuse for Hgb <7, Plt <10K  or bleeding  -wbc 0.33, hgb 9.0, plt normal today at 158K    Interstitial lung disease  - had abnormal PFTs during transplant evaluation 10/2019; seen by pulm; no significant desaturation on 6MWT; okay per transplant per pulm     Multiple myeloma in remission  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) High Risk disease; complex karyotype by CG; , t(14;20), monosomy 13 on FISH  -She has achieved and tolerated well VRd x completing 7, 28 day cycles and achieving stringent CR as of Dec 2019 marrow/biochemical studies   -adequate response to proceed with autoSCT  -she has PS 1-2 and multiple abnormalities in her pre transplant eval that have been cleared by pulmonology, cardiology, gynecology and are not prohibitive to proceed with transplant  -she has JESSE - sp  one dose of iv feraheme with response  will need reassessment by GI; if colonosocpy normal  proceed with transplant   -no further velcade maintenance pre transplant, last dose was 12/24/19  -plan for reduced dose clifton 140mg/m2 in light of age    Recurrent major depressive disorder, in full remission  -continue zoloft     Liver mass  -vascular per recent MRI in 09/2019 with no changes; seen by hepatology pre-transplant and signed off; recommend repeat MRI in 6 months     History of CVA with residual deficit  -one in 2008, one in 2011  -has been cleared by cardiology for transplant     Hyperlipemia  -on praluent, PCSK9 inhibitor; will hold on admit     Personal history of malignant neoplasm of breast  L breast cancer DCIS stage 0  -s/p lumpectomy and radiation, no endocrine tx due to CVA         VTE Risk Mitigation (From admission, onward)         Ordered     heparin (porcine) injection 1,600 Units  As needed (PRN)      02/10/20 2141     enoxaparin injection 40 mg  Daily      02/10/20 1738     IP VTE HIGH RISK PATIENT  Once      02/10/20 1738                Disposition:     Valerio Mcpherson MD  Bone Marrow  Transplant  [facility]

## 2020-02-15 NOTE — ASSESSMENT & PLAN NOTE
-hx of JESSE; received single dose feraheme pre transplant  -colonoscopy okay for transplant  -will monitor daily CBC inpatient  -transfuse for Hgb <7, Plt <10K or bleeding  -wbc 0.33, hgb 9.0, plt normal today at 158K

## 2020-02-15 NOTE — PLAN OF CARE
Patient remained free from falls throughout shift, call bell within reach. Day +3 following her Auto SCT. Still with intermittent nausea, scheduled zofran given. Poor appetite. No mouth lesions. Vitals stable, will continue to monitor.

## 2020-02-15 NOTE — SUBJECTIVE & OBJECTIVE
Subjective:     Interval History: Day +3 Verenice auto. Nausea and diarrhea are better.  Complained of mild decreased in appetite.  Vitals stable  WBC-0.33, ANC of 280, hemoglobin-9.0 and platelets 158 K    Objective:     Vital Signs (Most Recent):  Temp: 98.1 °F (36.7 °C) (02/15/20 1509)  Pulse: 78 (02/15/20 1509)  Resp: 15 (02/15/20 1509)  BP: 111/68 (02/15/20 1509)  SpO2: 98 % (02/15/20 1509) Vital Signs (24h Range):  Temp:  [97.7 °F (36.5 °C)-98.3 °F (36.8 °C)] 98.1 °F (36.7 °C)  Pulse:  [55-88] 78  Resp:  [14-17] 15  SpO2:  [92 %-99 %] 98 %  BP: (103-123)/(57-82) 111/68     Weight: 72 kg (158 lb 13.5 oz)  Body mass index is 28.14 kg/m².  Body surface area is 1.79 meters squared.      Intake/Output - Last 3 Shifts       02/13 0700 - 02/14 0659 02/14 0700 - 02/15 0659 02/15 0700 - 02/16 0659    P.O. 570 120 250    I.V. (mL/kg) 412 (5.6)      Blood       IV Piggyback       Total Intake(mL/kg) 982 (13.3) 120 (1.7) 250 (3.5)    Urine (mL/kg/hr) 3550 (2) 1450 (0.8) 550 (0.9)    Stool 0 0     Total Output 3550 1450 550    Net -2568 -1330 -300           Urine Occurrence  2 x     Stool Occurrence 3 x 3 x           Physical Exam   Constitutional: She is oriented to person, place, and time. Vital signs are normal. She is cooperative.   HENT:   Head: Normocephalic.   Eyes: Lids are normal.   Neck: Trachea normal and normal range of motion.   Cardiovascular: Normal rate, regular rhythm, S1 normal, S2 normal and normal heart sounds.   Pulmonary/Chest: Effort normal and breath sounds normal.   Abdominal: Soft. Normal appearance and bowel sounds are normal.   Musculoskeletal: Normal range of motion.   Neurological: She is alert and oriented to person, place, and time. Coordination and gait normal.   Skin: Skin is dry and intact. She is not diaphoretic. No pallor.   vascath to chest wall c/d/i   Psychiatric: She has a normal mood and affect. Her speech is normal and behavior is normal. Judgment and thought content normal.  Cognition and memory are normal.       Significant Labs:   CBC:   Recent Labs   Lab 02/14/20 0325 02/15/20  0334   WBC 1.41* 0.33*   HGB 9.0* 9.0*   HCT 30.6* 30.3*    158    and CMP:   Recent Labs   Lab 02/14/20  0325 02/15/20  0334    141   K 3.7 3.6    107   CO2 27 27   GLU 88 91   BUN 15 13   CREATININE 0.8 0.8   CALCIUM 8.2* 8.4*   PROT 5.8* 5.7*   ALBUMIN 3.2* 3.1*   BILITOT 0.6 0.7   ALKPHOS 72 65   AST 14 14   ALT 9* 9*   ANIONGAP 5* 7*   EGFRNONAA >60.0 >60.0       Diagnostic Results:  I have reviewed all pertinent imaging results/findings within the past 24 hours.

## 2020-02-15 NOTE — PLAN OF CARE
Patient AAOX4, up with walker to bathroom, and up in chair this afternoon. Day +3 AUTO SCT. Complaints of nausea; scheduled Zofran administered, as ordered. Protective isolation maintained. Oral hydration and ambulation in hallway encouraged. Patient stable, will continue to monitor.

## 2020-02-16 LAB
ALBUMIN SERPL BCP-MCNC: 3.2 G/DL (ref 3.5–5.2)
ALP SERPL-CCNC: 60 U/L (ref 55–135)
ALT SERPL W/O P-5'-P-CCNC: 9 U/L (ref 10–44)
ANION GAP SERPL CALC-SCNC: 8 MMOL/L (ref 8–16)
ANISOCYTOSIS BLD QL SMEAR: SLIGHT
AST SERPL-CCNC: 13 U/L (ref 10–40)
BASOPHILS # BLD AUTO: 0 K/UL (ref 0–0.2)
BASOPHILS NFR BLD: 0 % (ref 0–1.9)
BILIRUB SERPL-MCNC: 0.5 MG/DL (ref 0.1–1)
BUN SERPL-MCNC: 15 MG/DL (ref 8–23)
CALCIUM SERPL-MCNC: 8.7 MG/DL (ref 8.7–10.5)
CHLORIDE SERPL-SCNC: 107 MMOL/L (ref 95–110)
CO2 SERPL-SCNC: 27 MMOL/L (ref 23–29)
CREAT SERPL-MCNC: 0.9 MG/DL (ref 0.5–1.4)
DIFFERENTIAL METHOD: ABNORMAL
EOSINOPHIL # BLD AUTO: 0 K/UL (ref 0–0.5)
EOSINOPHIL NFR BLD: 0 % (ref 0–8)
ERYTHROCYTE [DISTWIDTH] IN BLOOD BY AUTOMATED COUNT: 20.4 % (ref 11.5–14.5)
EST. GFR  (AFRICAN AMERICAN): >60 ML/MIN/1.73 M^2
EST. GFR  (NON AFRICAN AMERICAN): >60 ML/MIN/1.73 M^2
GLUCOSE SERPL-MCNC: 93 MG/DL (ref 70–110)
HCT VFR BLD AUTO: 28.7 % (ref 37–48.5)
HGB BLD-MCNC: 8.8 G/DL (ref 12–16)
HYPOCHROMIA BLD QL SMEAR: ABNORMAL
IMM GRANULOCYTES # BLD AUTO: 0 K/UL (ref 0–0.04)
IMM GRANULOCYTES NFR BLD AUTO: 0 % (ref 0–0.5)
LYMPHOCYTES # BLD AUTO: 0 K/UL (ref 1–4.8)
LYMPHOCYTES NFR BLD: 6.7 % (ref 18–48)
MAGNESIUM SERPL-MCNC: 2.2 MG/DL (ref 1.6–2.6)
MCH RBC QN AUTO: 25.1 PG (ref 27–31)
MCHC RBC AUTO-ENTMCNC: 30.7 G/DL (ref 32–36)
MCV RBC AUTO: 82 FL (ref 82–98)
MONOCYTES # BLD AUTO: 0 K/UL (ref 0.3–1)
MONOCYTES NFR BLD: 6.7 % (ref 4–15)
NEUTROPHILS # BLD AUTO: 0.3 K/UL (ref 1.8–7.7)
NEUTROPHILS NFR BLD: 86.6 % (ref 38–73)
NRBC BLD-RTO: 0 /100 WBC
OVALOCYTES BLD QL SMEAR: ABNORMAL
PHOSPHATE SERPL-MCNC: 4.1 MG/DL (ref 2.7–4.5)
PLATELET # BLD AUTO: 161 K/UL (ref 150–350)
PLATELET BLD QL SMEAR: ABNORMAL
PMV BLD AUTO: 10.7 FL (ref 9.2–12.9)
POIKILOCYTOSIS BLD QL SMEAR: SLIGHT
POLYCHROMASIA BLD QL SMEAR: ABNORMAL
POTASSIUM SERPL-SCNC: 3.6 MMOL/L (ref 3.5–5.1)
PROT SERPL-MCNC: 5.7 G/DL (ref 6–8.4)
RBC # BLD AUTO: 3.5 M/UL (ref 4–5.4)
SODIUM SERPL-SCNC: 142 MMOL/L (ref 136–145)
WBC # BLD AUTO: 0.3 K/UL (ref 3.9–12.7)

## 2020-02-16 PROCEDURE — 84100 ASSAY OF PHOSPHORUS: CPT | Mod: HCNC

## 2020-02-16 PROCEDURE — 25000003 PHARM REV CODE 250: Mod: HCNC | Performed by: NURSE PRACTITIONER

## 2020-02-16 PROCEDURE — 80053 COMPREHEN METABOLIC PANEL: CPT | Mod: HCNC

## 2020-02-16 PROCEDURE — 83735 ASSAY OF MAGNESIUM: CPT | Mod: HCNC

## 2020-02-16 PROCEDURE — 25000003 PHARM REV CODE 250: Mod: HCNC | Performed by: INTERNAL MEDICINE

## 2020-02-16 PROCEDURE — 99233 PR SUBSEQUENT HOSPITAL CARE,LEVL III: ICD-10-PCS | Mod: HCNC,,, | Performed by: INTERNAL MEDICINE

## 2020-02-16 PROCEDURE — 63600175 PHARM REV CODE 636 W HCPCS: Mod: HCNC | Performed by: NURSE PRACTITIONER

## 2020-02-16 PROCEDURE — 63600175 PHARM REV CODE 636 W HCPCS: Mod: HCNC | Performed by: INTERNAL MEDICINE

## 2020-02-16 PROCEDURE — 85025 COMPLETE CBC W/AUTO DIFF WBC: CPT | Mod: HCNC

## 2020-02-16 PROCEDURE — 99233 SBSQ HOSP IP/OBS HIGH 50: CPT | Mod: HCNC,,, | Performed by: INTERNAL MEDICINE

## 2020-02-16 PROCEDURE — A9155 ARTIFICIAL SALIVA: HCPCS | Mod: HCNC | Performed by: INTERNAL MEDICINE

## 2020-02-16 PROCEDURE — 20600001 HC STEP DOWN PRIVATE ROOM: Mod: HCNC

## 2020-02-16 RX ADMIN — GABAPENTIN 600 MG: 300 CAPSULE ORAL at 08:02

## 2020-02-16 RX ADMIN — POTASSIUM CHLORIDE 20 MEQ: 1500 TABLET, EXTENDED RELEASE ORAL at 06:02

## 2020-02-16 RX ADMIN — AMLODIPINE BESYLATE 5 MG: 5 TABLET ORAL at 08:02

## 2020-02-16 RX ADMIN — ACYCLOVIR 800 MG: 200 CAPSULE ORAL at 08:02

## 2020-02-16 RX ADMIN — Medication 30 ML: at 08:02

## 2020-02-16 RX ADMIN — LEVOFLOXACIN 500 MG: 500 TABLET, FILM COATED ORAL at 08:02

## 2020-02-16 RX ADMIN — Medication 30 ML: at 05:02

## 2020-02-16 RX ADMIN — GABAPENTIN 300 MG: 300 CAPSULE ORAL at 08:02

## 2020-02-16 RX ADMIN — OYSTER SHELL CALCIUM WITH VITAMIN D 1 TABLET: 500; 200 TABLET, FILM COATED ORAL at 08:02

## 2020-02-16 RX ADMIN — SERTRALINE HYDROCHLORIDE 50 MG: 50 TABLET ORAL at 08:02

## 2020-02-16 RX ADMIN — ONDANSETRON 8 MG: 2 INJECTION INTRAMUSCULAR; INTRAVENOUS at 03:02

## 2020-02-16 RX ADMIN — ENOXAPARIN SODIUM 40 MG: 100 INJECTION SUBCUTANEOUS at 05:02

## 2020-02-16 RX ADMIN — ONDANSETRON 8 MG: 2 INJECTION INTRAMUSCULAR; INTRAVENOUS at 08:02

## 2020-02-16 RX ADMIN — Medication 30 ML: at 12:02

## 2020-02-16 RX ADMIN — FLUCONAZOLE 400 MG: 200 TABLET ORAL at 08:02

## 2020-02-16 RX ADMIN — PANTOPRAZOLE SODIUM 40 MG: 40 TABLET, DELAYED RELEASE ORAL at 08:02

## 2020-02-16 RX ADMIN — HEPARIN SODIUM 1600 UNITS: 1000 INJECTION, SOLUTION INTRAVENOUS; SUBCUTANEOUS at 08:02

## 2020-02-16 NOTE — PROGRESS NOTES
[facility]  Hematology  Bone Marrow Transplant  Progress Note    Patient Name: Shelby Thompson  Admission Date: 2/10/2020  Hospital Length of Stay: 6 days  Code Status: Full Code    Subjective:     Interval History: Day +4 Verenice auto. Nausea and diarrhea are better.  Vitals stable  WBC-0.30, ANC of 260, hemoglobin-8.8 and platelets 161 K    Objective:     Vital Signs (Most Recent):  Temp: 98.2 °F (36.8 °C) (02/16/20 1126)  Pulse: 77 (02/16/20 1126)  Resp: 16 (02/16/20 1126)  BP: 103/62 (02/16/20 1126)  SpO2: 98 % (02/16/20 1126) Vital Signs (24h Range):  Temp:  [98 °F (36.7 °C)-98.3 °F (36.8 °C)] 98.2 °F (36.8 °C)  Pulse:  [52-78] 77  Resp:  [15-16] 16  SpO2:  [92 %-98 %] 98 %  BP: ()/(62-72) 103/62     Weight: 72.2 kg (159 lb 2.8 oz)  Body mass index is 28.2 kg/m².  Body surface area is 1.79 meters squared.      Intake/Output - Last 3 Shifts       02/14 0700 - 02/15 0659 02/15 0700 - 02/16 0659 02/16 0700 - 02/17 0659    P.O. 120 935 125    I.V. (mL/kg)       Total Intake(mL/kg) 120 (1.7) 935 (13) 125 (1.7)    Urine (mL/kg/hr) 1450 (0.8) 1550 (0.9)     Stool 0      Total Output 1450 1550     Net -1330 -615 +125           Urine Occurrence 2 x  1 x    Stool Occurrence 3 x  1 x          Physical Exam   Constitutional: She is oriented to person, place, and time. Vital signs are normal. She is cooperative.   HENT:   Head: Normocephalic.   Eyes: Lids are normal.   Neck: Trachea normal and normal range of motion.   Cardiovascular: Normal rate, regular rhythm, S1 normal, S2 normal and normal heart sounds.   Pulmonary/Chest: Effort normal and breath sounds normal.   Abdominal: Soft. Normal appearance and bowel sounds are normal.   Musculoskeletal: Normal range of motion.   Neurological: She is alert and oriented to person, place, and time. Coordination and gait normal.   Skin: Skin is dry and intact. She is not diaphoretic. No pallor.   vascath to chest wall c/d/i   Psychiatric: She has a normal  mood and affect. Her speech is normal and behavior is normal. Judgment and thought content normal. Cognition and memory are normal.       Significant Labs:   CBC:   Recent Labs   Lab 02/15/20  0334 02/16/20  0320   WBC 0.33* 0.30*   HGB 9.0* 8.8*   HCT 30.3* 28.7*    161    and CMP:   Recent Labs   Lab 02/15/20  0334 02/16/20  0320    142   K 3.6 3.6    107   CO2 27 27   GLU 91 93   BUN 13 15   CREATININE 0.8 0.9   CALCIUM 8.4* 8.7   PROT 5.7* 5.7*   ALBUMIN 3.1* 3.2*   BILITOT 0.7 0.5   ALKPHOS 65 60   AST 14 13   ALT 9* 9*   ANIONGAP 7* 8   EGFRNONAA >60.0 >60.0       Diagnostic Results:  I have reviewed all pertinent imaging results/findings within the past 24 hours.    Assessment/Plan:     * Status post autologous bone marrow transplant  -Today is day +4  -received Melphalan 140mg/m2 without difficulty  -stem cell infusion was on 2/12 without issue, received 3 bags (2.85 x 10^6) CD 34 stem cells  -some expected fatigue, poor appetite, not watery diarrhea, and nausea. Scheduled zofran and consider IVFs tomorrow if PO intake has decreased    Regimen as below:  Transplant Day -2: IV fluids  Transplant Day -1 through Day 0: Antiemetics   Transplant Day -1: Melphalan   Transplant Day 0: Stem Cell Infusion   Transplant Day +7: Growth factor begins    Type 2 diabetes mellitus without complication, without long-term current use of insulin  -monitor closely while inpatient; not on active medication     Essential hypertension  -continue norvasc     Drug-induced polyneuropathy  -pt has had numbness/tingling to hands and BUE tremors post CVA, for which she is on gabapentin 600 mg BID  - prn tramadol     Pancytopenia due to antineoplastic chemotherapy  -hx of JESSE; received single dose feraheme pre transplant  -colonoscopy okay for transplant  -will monitor daily CBC inpatient  -transfuse for Hgb <7, Plt <10K or bleeding  -wbc 0.30, hgb 8.8, plt normal today at 161K    Interstitial lung disease  - had  abnormal PFTs during transplant evaluation 10/2019; seen by pulm; no significant desaturation on 6MWT; okay per transplant per pulm     Multiple myeloma in remission  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) High Risk disease; complex karyotype by CG; , t(14;20), monosomy 13 on FISH  -She has achieved and tolerated well VRd x completing 7, 28 day cycles and achieving stringent CR as of Dec 2019 marrow/biochemical studies   -adequate response to proceed with autoSCT  -she has PS 1-2 and multiple abnormalities in her pre transplant eval that have been cleared by pulmonology, cardiology, gynecology and are not prohibitive to proceed with transplant  -she has JESSE - sp  one dose of iv feraheme with response  will need reassessment by GI; if colonosocpy normal  proceed with transplant   -no further velcade maintenance pre transplant, last dose was 12/24/19  -plan for reduced dose clifton 140mg/m2 in light of age    Recurrent major depressive disorder, in full remission  -continue zoloft     Liver mass  -vascular per recent MRI in 09/2019 with no changes; seen by hepatology pre-transplant and signed off; recommend repeat MRI in 6 months     History of CVA with residual deficit  -one in 2008, one in 2011  -has been cleared by cardiology for transplant     Hyperlipemia  -on praluent, PCSK9 inhibitor; will hold on admit     Personal history of malignant neoplasm of breast  L breast cancer DCIS stage 0  -s/p lumpectomy and radiation, no endocrine tx due to CVA         VTE Risk Mitigation (From admission, onward)         Ordered     heparin (porcine) injection 1,600 Units  As needed (PRN)      02/10/20 2141     enoxaparin injection 40 mg  Daily      02/10/20 1738     IP VTE HIGH RISK PATIENT  Once      02/10/20 1738                Disposition:     Valerio Mcpherson MD  Bone Marrow Transplant  [facility]

## 2020-02-16 NOTE — ASSESSMENT & PLAN NOTE
-hx of JESSE; received single dose feraheme pre transplant  -colonoscopy okay for transplant  -will monitor daily CBC inpatient  -transfuse for Hgb <7, Plt <10K or bleeding  -wbc 0.30, hgb 8.8, plt normal today at 161K

## 2020-02-16 NOTE — SUBJECTIVE & OBJECTIVE
Subjective:     Interval History: Day +4 Verenice auto. Nausea and diarrhea are better.  Vitals stable  WBC-0.30, ANC of 260, hemoglobin-8.8 and platelets 161 K    Objective:     Vital Signs (Most Recent):  Temp: 98.2 °F (36.8 °C) (02/16/20 1126)  Pulse: 77 (02/16/20 1126)  Resp: 16 (02/16/20 1126)  BP: 103/62 (02/16/20 1126)  SpO2: 98 % (02/16/20 1126) Vital Signs (24h Range):  Temp:  [98 °F (36.7 °C)-98.3 °F (36.8 °C)] 98.2 °F (36.8 °C)  Pulse:  [52-78] 77  Resp:  [15-16] 16  SpO2:  [92 %-98 %] 98 %  BP: ()/(62-72) 103/62     Weight: 72.2 kg (159 lb 2.8 oz)  Body mass index is 28.2 kg/m².  Body surface area is 1.79 meters squared.      Intake/Output - Last 3 Shifts       02/14 0700 - 02/15 0659 02/15 0700 - 02/16 0659 02/16 0700 - 02/17 0659    P.O. 120 935 125    I.V. (mL/kg)       Total Intake(mL/kg) 120 (1.7) 935 (13) 125 (1.7)    Urine (mL/kg/hr) 1450 (0.8) 1550 (0.9)     Stool 0      Total Output 1450 1550     Net -1330 -615 +125           Urine Occurrence 2 x  1 x    Stool Occurrence 3 x  1 x          Physical Exam   Constitutional: She is oriented to person, place, and time. Vital signs are normal. She is cooperative.   HENT:   Head: Normocephalic.   Eyes: Lids are normal.   Neck: Trachea normal and normal range of motion.   Cardiovascular: Normal rate, regular rhythm, S1 normal, S2 normal and normal heart sounds.   Pulmonary/Chest: Effort normal and breath sounds normal.   Abdominal: Soft. Normal appearance and bowel sounds are normal.   Musculoskeletal: Normal range of motion.   Neurological: She is alert and oriented to person, place, and time. Coordination and gait normal.   Skin: Skin is dry and intact. She is not diaphoretic. No pallor.   vascath to chest wall c/d/i   Psychiatric: She has a normal mood and affect. Her speech is normal and behavior is normal. Judgment and thought content normal. Cognition and memory are normal.       Significant Labs:   CBC:   Recent Labs   Lab 02/15/20  2968  02/16/20  0320   WBC 0.33* 0.30*   HGB 9.0* 8.8*   HCT 30.3* 28.7*    161    and CMP:   Recent Labs   Lab 02/15/20  0334 02/16/20  0320    142   K 3.6 3.6    107   CO2 27 27   GLU 91 93   BUN 13 15   CREATININE 0.8 0.9   CALCIUM 8.4* 8.7   PROT 5.7* 5.7*   ALBUMIN 3.1* 3.2*   BILITOT 0.7 0.5   ALKPHOS 65 60   AST 14 13   ALT 9* 9*   ANIONGAP 7* 8   EGFRNONAA >60.0 >60.0       Diagnostic Results:  I have reviewed all pertinent imaging results/findings within the past 24 hours.

## 2020-02-16 NOTE — PLAN OF CARE
Patient remained free from falls throughout shift, call bell within reach. Day +4 following her Auto SCT. Patient states nausea better than yesterday. Vitals stable, will continue to monitor.

## 2020-02-16 NOTE — ASSESSMENT & PLAN NOTE
-Today is day +4  -received Melphalan 140mg/m2 without difficulty  -stem cell infusion was on 2/12 without issue, received 3 bags (2.85 x 10^6) CD 34 stem cells  -some expected fatigue, poor appetite, not watery diarrhea, and nausea. Scheduled zofran and consider IVFs tomorrow if PO intake has decreased    Regimen as below:  Transplant Day -2: IV fluids  Transplant Day -1 through Day 0: Antiemetics   Transplant Day -1: Melphalan   Transplant Day 0: Stem Cell Infusion   Transplant Day +7: Growth factor begins

## 2020-02-16 NOTE — PLAN OF CARE
Patient AAOX4, up independently with walker, and fall precautions maintained. Day +4 AUTO SCT; appears more fatigued today but up in chair this morning. Complaints of neck soreness this AM; waffle mattress applied to bed for comfort. Scheduled Zofran continued for nausea management. Oral hydration encouraged with fair appetite at this time. Shower completed and linens changed. Patient stable, will continue to monitor.

## 2020-02-17 ENCOUNTER — TELEPHONE (OUTPATIENT)
Dept: CARDIOLOGY | Facility: CLINIC | Age: 70
End: 2020-02-17

## 2020-02-17 LAB
ABO + RH BLD: NORMAL
ALBUMIN SERPL BCP-MCNC: 3.2 G/DL (ref 3.5–5.2)
ALP SERPL-CCNC: 56 U/L (ref 55–135)
ALT SERPL W/O P-5'-P-CCNC: 11 U/L (ref 10–44)
ANION GAP SERPL CALC-SCNC: 6 MMOL/L (ref 8–16)
ANISOCYTOSIS BLD QL SMEAR: SLIGHT
AST SERPL-CCNC: 15 U/L (ref 10–40)
BASOPHILS # BLD AUTO: 0 K/UL (ref 0–0.2)
BASOPHILS NFR BLD: 0 % (ref 0–1.9)
BILIRUB SERPL-MCNC: 0.4 MG/DL (ref 0.1–1)
BLD GP AB SCN CELLS X3 SERPL QL: NORMAL
BUN SERPL-MCNC: 13 MG/DL (ref 8–23)
CALCIUM SERPL-MCNC: 8.3 MG/DL (ref 8.7–10.5)
CHLORIDE SERPL-SCNC: 107 MMOL/L (ref 95–110)
CO2 SERPL-SCNC: 28 MMOL/L (ref 23–29)
CREAT SERPL-MCNC: 0.8 MG/DL (ref 0.5–1.4)
DIFFERENTIAL METHOD: ABNORMAL
EOSINOPHIL # BLD AUTO: 0 K/UL (ref 0–0.5)
EOSINOPHIL NFR BLD: 0 % (ref 0–8)
ERYTHROCYTE [DISTWIDTH] IN BLOOD BY AUTOMATED COUNT: 20.1 % (ref 11.5–14.5)
EST. GFR  (AFRICAN AMERICAN): >60 ML/MIN/1.73 M^2
EST. GFR  (NON AFRICAN AMERICAN): >60 ML/MIN/1.73 M^2
GLUCOSE SERPL-MCNC: 80 MG/DL (ref 70–110)
HCT VFR BLD AUTO: 27.9 % (ref 37–48.5)
HGB BLD-MCNC: 8.3 G/DL (ref 12–16)
HYPOCHROMIA BLD QL SMEAR: ABNORMAL
IMM GRANULOCYTES # BLD AUTO: 0 K/UL (ref 0–0.04)
IMM GRANULOCYTES NFR BLD AUTO: 0 % (ref 0–0.5)
LYMPHOCYTES # BLD AUTO: 0 K/UL (ref 1–4.8)
LYMPHOCYTES NFR BLD: 8.3 % (ref 18–48)
MAGNESIUM SERPL-MCNC: 2.1 MG/DL (ref 1.6–2.6)
MCH RBC QN AUTO: 24.8 PG (ref 27–31)
MCHC RBC AUTO-ENTMCNC: 29.7 G/DL (ref 32–36)
MCV RBC AUTO: 83 FL (ref 82–98)
MONOCYTES # BLD AUTO: 0 K/UL (ref 0.3–1)
MONOCYTES NFR BLD: 12.5 % (ref 4–15)
NEUTROPHILS # BLD AUTO: 0.2 K/UL (ref 1.8–7.7)
NEUTROPHILS NFR BLD: 79.2 % (ref 38–73)
NRBC BLD-RTO: 0 /100 WBC
OVALOCYTES BLD QL SMEAR: ABNORMAL
PHOSPHATE SERPL-MCNC: 4 MG/DL (ref 2.7–4.5)
PLATELET # BLD AUTO: 137 K/UL (ref 150–350)
PMV BLD AUTO: 11.5 FL (ref 9.2–12.9)
POIKILOCYTOSIS BLD QL SMEAR: SLIGHT
POLYCHROMASIA BLD QL SMEAR: ABNORMAL
POTASSIUM SERPL-SCNC: 3.7 MMOL/L (ref 3.5–5.1)
PROT SERPL-MCNC: 5.7 G/DL (ref 6–8.4)
RBC # BLD AUTO: 3.35 M/UL (ref 4–5.4)
SODIUM SERPL-SCNC: 141 MMOL/L (ref 136–145)
WBC # BLD AUTO: 0.24 K/UL (ref 3.9–12.7)

## 2020-02-17 PROCEDURE — 99233 PR SUBSEQUENT HOSPITAL CARE,LEVL III: ICD-10-PCS | Mod: HCNC,,, | Performed by: INTERNAL MEDICINE

## 2020-02-17 PROCEDURE — 20600001 HC STEP DOWN PRIVATE ROOM: Mod: HCNC

## 2020-02-17 PROCEDURE — 83735 ASSAY OF MAGNESIUM: CPT | Mod: HCNC

## 2020-02-17 PROCEDURE — 25000003 PHARM REV CODE 250: Mod: HCNC | Performed by: NURSE PRACTITIONER

## 2020-02-17 PROCEDURE — 63600175 PHARM REV CODE 636 W HCPCS: Mod: HCNC | Performed by: INTERNAL MEDICINE

## 2020-02-17 PROCEDURE — A9155 ARTIFICIAL SALIVA: HCPCS | Mod: HCNC | Performed by: INTERNAL MEDICINE

## 2020-02-17 PROCEDURE — 63600175 PHARM REV CODE 636 W HCPCS: Mod: HCNC | Performed by: NURSE PRACTITIONER

## 2020-02-17 PROCEDURE — 80053 COMPREHEN METABOLIC PANEL: CPT | Mod: HCNC

## 2020-02-17 PROCEDURE — 97535 SELF CARE MNGMENT TRAINING: CPT | Mod: HCNC

## 2020-02-17 PROCEDURE — 99233 SBSQ HOSP IP/OBS HIGH 50: CPT | Mod: HCNC,,, | Performed by: INTERNAL MEDICINE

## 2020-02-17 PROCEDURE — 84100 ASSAY OF PHOSPHORUS: CPT | Mod: HCNC

## 2020-02-17 PROCEDURE — 25000003 PHARM REV CODE 250: Mod: HCNC | Performed by: INTERNAL MEDICINE

## 2020-02-17 PROCEDURE — 85025 COMPLETE CBC W/AUTO DIFF WBC: CPT | Mod: HCNC

## 2020-02-17 PROCEDURE — 86850 RBC ANTIBODY SCREEN: CPT | Mod: HCNC

## 2020-02-17 RX ADMIN — POTASSIUM CHLORIDE 20 MEQ: 1500 TABLET, EXTENDED RELEASE ORAL at 05:02

## 2020-02-17 RX ADMIN — ONDANSETRON 8 MG: 2 INJECTION INTRAMUSCULAR; INTRAVENOUS at 02:02

## 2020-02-17 RX ADMIN — OYSTER SHELL CALCIUM WITH VITAMIN D 1 TABLET: 500; 200 TABLET, FILM COATED ORAL at 08:02

## 2020-02-17 RX ADMIN — SERTRALINE HYDROCHLORIDE 50 MG: 50 TABLET ORAL at 08:02

## 2020-02-17 RX ADMIN — Medication 30 ML: at 09:02

## 2020-02-17 RX ADMIN — HEPARIN SODIUM 1600 UNITS: 1000 INJECTION, SOLUTION INTRAVENOUS; SUBCUTANEOUS at 09:02

## 2020-02-17 RX ADMIN — ACYCLOVIR 800 MG: 200 CAPSULE ORAL at 08:02

## 2020-02-17 RX ADMIN — ONDANSETRON 8 MG: 2 INJECTION INTRAMUSCULAR; INTRAVENOUS at 09:02

## 2020-02-17 RX ADMIN — PANTOPRAZOLE SODIUM 40 MG: 40 TABLET, DELAYED RELEASE ORAL at 08:02

## 2020-02-17 RX ADMIN — GABAPENTIN 300 MG: 300 CAPSULE ORAL at 08:02

## 2020-02-17 RX ADMIN — ONDANSETRON 8 MG: 2 INJECTION INTRAMUSCULAR; INTRAVENOUS at 08:02

## 2020-02-17 RX ADMIN — FLUCONAZOLE 400 MG: 200 TABLET ORAL at 08:02

## 2020-02-17 RX ADMIN — ACYCLOVIR 800 MG: 200 CAPSULE ORAL at 09:02

## 2020-02-17 RX ADMIN — Medication 30 ML: at 08:02

## 2020-02-17 RX ADMIN — Medication 30 ML: at 04:02

## 2020-02-17 RX ADMIN — OYSTER SHELL CALCIUM WITH VITAMIN D 1 TABLET: 500; 200 TABLET, FILM COATED ORAL at 09:02

## 2020-02-17 RX ADMIN — HEPARIN SODIUM 1600 UNITS: 1000 INJECTION, SOLUTION INTRAVENOUS; SUBCUTANEOUS at 03:02

## 2020-02-17 RX ADMIN — LEVOFLOXACIN 500 MG: 500 TABLET, FILM COATED ORAL at 09:02

## 2020-02-17 RX ADMIN — GABAPENTIN 600 MG: 300 CAPSULE ORAL at 09:02

## 2020-02-17 RX ADMIN — ENOXAPARIN SODIUM 40 MG: 100 INJECTION SUBCUTANEOUS at 04:02

## 2020-02-17 RX ADMIN — Medication 30 ML: at 12:02

## 2020-02-17 RX ADMIN — HEPARIN SODIUM 1600 UNITS: 1000 INJECTION, SOLUTION INTRAVENOUS; SUBCUTANEOUS at 08:02

## 2020-02-17 NOTE — PLAN OF CARE
Evaluation complete and goals set.  Cont with POC  Chyna Saldivar, OT  2/17/2020    Problem: Occupational Therapy Goal  Goal: Occupational Therapy Goal  Description  Goals to be met by: 3/2    Patient will increase functional independence with ADLs by performing:    Feeding with [place].  UE Dressing with [place].  LE Dressing with [place].  Grooming while seated with [place].  Toileting from toilet with [place] for hygiene and clothing management.      Outcome: Ongoing, Progressing

## 2020-02-17 NOTE — PROGRESS NOTES
[facility]  Hematology  Bone Marrow Transplant  Progress Note    Patient Name: Shelby Thompson  Admission Date: 2/10/2020  Hospital Length of Stay: 7 days  Code Status: Full Code    Subjective:     Interval History:  Day +5 Verenice auto. VSS, afebrile, now on scheduled Zofran and denies nausea this am. Denies diarrhea as well. Complains of poor appetite. BP low this am and holding scheduled BP meds    Objective:     Vital Signs (Most Recent):  Temp: 98.3 °F (36.8 °C) (02/17/20 0714)  Pulse: 69 (02/17/20 0714)  Resp: 15 (02/17/20 0714)  BP: 104/64 (02/17/20 0714)  SpO2: 95 % (02/17/20 0714) Vital Signs (24h Range):  Temp:  [98 °F (36.7 °C)-98.4 °F (36.9 °C)] 98.3 °F (36.8 °C)  Pulse:  [60-79] 69  Resp:  [15-16] 15  SpO2:  [94 %-98 %] 95 %  BP: ()/(55-64) 104/64     Weight: 71.4 kg (157 lb 6.5 oz)  Body mass index is 27.88 kg/m².  Body surface area is 1.78 meters squared.      Intake/Output - Last 3 Shifts       02/15 0700 - 02/16 0659 02/16 0700 - 02/17 0659 02/17 0700 - 02/18 0659    P.O. 935 845     Total Intake(mL/kg) 935 (13) 845 (11.8)     Urine (mL/kg/hr) 1550 (0.9) 850 (0.5)     Stool  0     Total Output 1550 850     Net -615 -5            Urine Occurrence  2 x     Stool Occurrence  2 x           Physical Exam   Constitutional: She is oriented to person, place, and time. Vital signs are normal. She is cooperative.   HENT:   Head: Normocephalic.   Eyes: Lids are normal.   Neck: Trachea normal and normal range of motion.   Cardiovascular: Normal rate, regular rhythm, S1 normal, S2 normal and normal heart sounds.   Pulmonary/Chest: Effort normal and breath sounds normal.   Abdominal: Soft. Normal appearance and bowel sounds are normal.   Musculoskeletal: Normal range of motion.   Neurological: She is alert and oriented to person, place, and time. Coordination and gait normal.   Skin: Skin is dry and intact. She is not diaphoretic. No pallor.   vascath to chest wall c/d/i   Psychiatric: She  has a normal mood and affect. Her speech is normal and behavior is normal. Judgment and thought content normal. Cognition and memory are normal.       Significant Labs:   CBC:   Recent Labs   Lab 02/16/20 0320 02/17/20  0300   WBC 0.30* 0.24*   HGB 8.8* 8.3*   HCT 28.7* 27.9*    137*    and CMP:   Recent Labs   Lab 02/16/20 0320 02/17/20  0300    141   K 3.6 3.7    107   CO2 27 28   GLU 93 80   BUN 15 13   CREATININE 0.9 0.8   CALCIUM 8.7 8.3*   PROT 5.7* 5.7*   ALBUMIN 3.2* 3.2*   BILITOT 0.5 0.4   ALKPHOS 60 56   AST 13 15   ALT 9* 11   ANIONGAP 8 6*   EGFRNONAA >60.0 >60.0       Diagnostic Results:  None    Assessment/Plan:     * Status post autologous bone marrow transplant  -Today is day +5  -received Melphalan 140mg/m2 without difficulty  -stem cell infusion was on 2/12 without issue, received 3 bags (2.85 x 10^6) CD 34 stem cells  -some expected fatigue, poor appetite, not watery diarrhea, and nausea which is improved today with scheduled zofran    Regimen as below:  Transplant Day -2: IV fluids  Transplant Day -1 through Day 0: Antiemetics   Transplant Day -1: Melphalan   Transplant Day 0: Stem Cell Infusion   Transplant Day +7: Growth factor begins    Multiple myeloma in remission  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) High Risk disease; complex karyotype by CG; , t(14;20), monosomy 13 on FISH  -She has achieved and tolerated well VRd x completing 7, 28 day cycles and achieving stringent CR as of Dec 2019 marrow/biochemical studies   -adequate response to proceed with autoSCT  -she has PS 1-2 and multiple abnormalities in her pre transplant eval that have been cleared by pulmonology, cardiology, gynecology and are not prohibitive to proceed with transplant  -she has JESSE - sp  one dose of iv feraheme with response  will need reassessment by GI; if colonosocpy normal  proceed with transplant   -no further velcade maintenance pre  transplant, last dose was 12/24/19  -plan for reduced dose clifton 140mg/m2 in light of age    Type 2 diabetes mellitus without complication, without long-term current use of insulin  -monitor closely while inpatient; not on active medication     Essential hypertension  -continue norvasc   - holding this am dose    Drug-induced polyneuropathy  -pt has had numbness/tingling to hands and BUE tremors post CVA, for which she is on gabapentin 600 mg BID  - prn tramadol     Pancytopenia due to antineoplastic chemotherapy  -hx of JESSE; received single dose feraheme pre transplant  -colonoscopy okay for transplant  -will monitor daily CBC inpatient  -transfuse for Hgb <7, Plt <10K or bleeding  -wbc 0.24, hgb 8.3, plt normal today at 137K    Interstitial lung disease  - had abnormal PFTs during transplant evaluation 10/2019; seen by pulm; no significant desaturation on 6MWT; okay per transplant per pulm     Recurrent major depressive disorder, in full remission  -continue zoloft     Liver mass  -vascular per recent MRI in 09/2019 with no changes; seen by hepatology pre-transplant and signed off; recommend repeat MRI in 6 months     History of CVA with residual deficit  -one in 2008, one in 2011  -has been cleared by cardiology for transplant     Hyperlipemia  -on praluent, PCSK9 inhibitor; will hold on admit     Personal history of malignant neoplasm of breast  L breast cancer DCIS stage 0  -s/p lumpectomy and radiation, no endocrine tx due to CVA         VTE Risk Mitigation (From admission, onward)         Ordered     heparin (porcine) injection 1,600 Units  As needed (PRN)      02/10/20 2141     enoxaparin injection 40 mg  Daily      02/10/20 1738     IP VTE HIGH RISK PATIENT  Once      02/10/20 1738                Disposition: pending count recovery post transplant    Kathleen Kimball NP  Bone Marrow Transplant  [facility]

## 2020-02-17 NOTE — PLAN OF CARE
Patient day +5 auto SCT. AAOx4, VSS, afebrile, and without injury. Fall precautions maintained. Patient instructed on how to contact the nurse. Patient on room air without distress; patient on regular diet with improving appetite. No complaints of pain. Complaints of nausea addressed with PRN and scheduled zofran. Oral mucosa remains intact, saliva sub provided. Patient up in chair much of the day. Independently ambulating to the restroom; adequate output. Loose BM x2. Central line is clean, dry, intact, dressing changed today. Linens changed; to shower this afternoon. Neutropenic precautions continued. Questions and concerns have been addressed; will continue to monitor.

## 2020-02-17 NOTE — PROGRESS NOTES
Provided letter for pt stating she can have cleaning service monthly x4 as planned due to low immune system.    Juli Flores, CHRISSY, NP  Hematology/Oncology

## 2020-02-17 NOTE — SUBJECTIVE & OBJECTIVE
Subjective:     Interval History:  Day +5 Verenice auto. VSS, afebrile, now on scheduled Zofran and denies nausea this am. Denies diarrhea as well. Complains of poor appetite. BP low this am and holding scheduled BP meds    Objective:     Vital Signs (Most Recent):  Temp: 98.3 °F (36.8 °C) (02/17/20 0714)  Pulse: 69 (02/17/20 0714)  Resp: 15 (02/17/20 0714)  BP: 104/64 (02/17/20 0714)  SpO2: 95 % (02/17/20 0714) Vital Signs (24h Range):  Temp:  [98 °F (36.7 °C)-98.4 °F (36.9 °C)] 98.3 °F (36.8 °C)  Pulse:  [60-79] 69  Resp:  [15-16] 15  SpO2:  [94 %-98 %] 95 %  BP: ()/(55-64) 104/64     Weight: 71.4 kg (157 lb 6.5 oz)  Body mass index is 27.88 kg/m².  Body surface area is 1.78 meters squared.      Intake/Output - Last 3 Shifts       02/15 0700 - 02/16 0659 02/16 0700 - 02/17 0659 02/17 0700 - 02/18 0659    P.O. 935 845     Total Intake(mL/kg) 935 (13) 845 (11.8)     Urine (mL/kg/hr) 1550 (0.9) 850 (0.5)     Stool  0     Total Output 1550 850     Net -615 -5            Urine Occurrence  2 x     Stool Occurrence  2 x           Physical Exam   Constitutional: She is oriented to person, place, and time. Vital signs are normal. She is cooperative.   HENT:   Head: Normocephalic.   Eyes: Lids are normal.   Neck: Trachea normal and normal range of motion.   Cardiovascular: Normal rate, regular rhythm, S1 normal, S2 normal and normal heart sounds.   Pulmonary/Chest: Effort normal and breath sounds normal.   Abdominal: Soft. Normal appearance and bowel sounds are normal.   Musculoskeletal: Normal range of motion.   Neurological: She is alert and oriented to person, place, and time. Coordination and gait normal.   Skin: Skin is dry and intact. She is not diaphoretic. No pallor.   vascath to chest wall c/d/i   Psychiatric: She has a normal mood and affect. Her speech is normal and behavior is normal. Judgment and thought content normal. Cognition and memory are normal.       Significant Labs:   CBC:   Recent Labs   Lab  02/16/20  0320 02/17/20  0300   WBC 0.30* 0.24*   HGB 8.8* 8.3*   HCT 28.7* 27.9*    137*    and CMP:   Recent Labs   Lab 02/16/20 0320 02/17/20  0300    141   K 3.6 3.7    107   CO2 27 28   GLU 93 80   BUN 15 13   CREATININE 0.9 0.8   CALCIUM 8.7 8.3*   PROT 5.7* 5.7*   ALBUMIN 3.2* 3.2*   BILITOT 0.5 0.4   ALKPHOS 60 56   AST 13 15   ALT 9* 11   ANIONGAP 8 6*   EGFRNONAA >60.0 >60.0       Diagnostic Results:  None

## 2020-02-17 NOTE — ASSESSMENT & PLAN NOTE
-hx of JESSE; received single dose feraheme pre transplant  -colonoscopy okay for transplant  -will monitor daily CBC inpatient  -transfuse for Hgb <7, Plt <10K or bleeding  -wbc 0.24, hgb 8.3, plt normal today at 137K

## 2020-02-17 NOTE — ASSESSMENT & PLAN NOTE
-Today is day +5  -received Melphalan 140mg/m2 without difficulty  -stem cell infusion was on 2/12 without issue, received 3 bags (2.85 x 10^6) CD 34 stem cells  -some expected fatigue, poor appetite, not watery diarrhea, and nausea which is improved today with scheduled zofran    Regimen as below:  Transplant Day -2: IV fluids  Transplant Day -1 through Day 0: Antiemetics   Transplant Day -1: Melphalan   Transplant Day 0: Stem Cell Infusion   Transplant Day +7: Growth factor begins

## 2020-02-17 NOTE — PLAN OF CARE
Plan of care reviewed with the patient at the beginning of shift. The patient is alert and oriented. GCS 15. Day +5 auto SCT. Denying complaints at this time. The patient is ambulatory and independent. Remained free from falls and injuries throughout shift. NAEON. VSS. Bed in low locked position. Call bell and personal items within reach. Will continue to monitor.

## 2020-02-17 NOTE — PT/OT/SLP EVAL
Occupational Therapy Treament     Name: Shelby Thompson  MRN: [id number]  Admitting Diagnosis:  Status post autologous bone marrow transplant      Recommendations:     Discharge Recommendations: home with home health  Discharge Equipment Recommendations:  none  Barriers to discharge:  Decreased caregiver support    Assessment:     Shelby Thompson is a 69 y.o. female with a medical diagnosis of Status post autologous bone marrow transplant.  She presents supine with friend present.  Pt with fair balance and will benefit from skilled OT for max functional performance  Performance deficits affecting function: impaired endurance.      Rehab Prognosis: Good; patient would benefit from acute skilled OT services to address these deficits and reach maximum level of function.       Plan:     Patient to be seen 2 x/week to address the above listed problems via self-care/home management, therapeutic activities, therapeutic exercises  · Plan of Care Expires: 03/11/20  · Plan of Care Reviewed with: patient    Subjective     Chief Complaint: Pt without complaint   Patient/Family Comments/goals: return to home     Occupational Profile:  Living Environment: Pt lives with elderly spouse in [address] house   Previous level of function: Pt with SBA for shower task with shower chair in place and indep with dressing and toielting  Equipment Used at Home:  none  Assistance upon Discharge: Spouse primary CG and is sick    Pain/Comfort:  Pain Rating 1: 0/10    Patients cultural, spiritual, Lutheran conflicts given the current situation: no    Objective:     Communicated with: RN prior to session.  Patient found supine with peripheral IV upon OT entry to room.    General Precautions: Standard, fall   Orthopedic Precautions:N/A   Braces: N/A     Occupational Performance:    Bed Mobility:    · Pt with indep bed mobility     Functional Mobility/Transfers:  · Functional Mobility: Pt with walker use for safe in room mobility     Activities of  Daily Living:  · Pt SBA for safe self care completion in room     Cognitive/Visual Perceptual:  Cognitive/Psychosocial Skills:     -       Oriented to: Person, Place, Time and Situation   -       Follows Commands/attention:Follows two-step commands  -       Communication: clear/fluent  -       Memory: No Deficits noted  -       Safety awareness/insight to disability: intact   -       Mood/Affect/Coping skills/emotional control: Appropriate     Physical Exam:  Upper Extremity Range of Motion:     -       Right Upper Extremity: WFL  -       Left Upper Extremity: WFL  Upper Extremity Strength:    -       Right Upper Extremity: WFL  -       Left Upper Extremity: WFL    AMPAC 6 Click ADL:  AMPAC Total Score: 24    Treatment & Education:  -Pt educated on safety, role of OT, importance of increased participation in self care for gains , expectations for participation, expectations for gains, POC, energy conservation, caregiver strain. White board updated.   - ADL training   Education:    Patient left supine with all lines intact    GOALS:   Multidisciplinary Problems     Occupational Therapy Goals        Problem: Occupational Therapy Goal    Goal Priority Disciplines Outcome Interventions   Occupational Therapy Goal     OT, PT/OT Ongoing, Progressing    Description:  Goals to be met by: 3/2    Patient will increase functional independence with ADLs by performing:    Feeding with Home.  UE Dressing with Home.  LE Dressing with Home.  Grooming while seated with Home.  Toileting from toilet with Home for hygiene and clothing management.                       History:     Past Medical History:   Diagnosis Date    Acute respiratory failure with hypoxia 4/30/2019    FUENTES (acute kidney injury) 5/1/2019    Allergy     Anemia     Aortic aneurysm     Breast cancer 10/2011    left breast Stage 0 DCIS    Chronic diastolic congestive heart failure 11/6/2015    Colon polyp     GERD  (gastroesophageal reflux disease)     Hiatal hernia     History of colonic polyps     Hx of psychiatric care     Zoloft    HX: breast cancer     Hyperlipemia     Hypertension     ICH (intracerebral hemorrhage)     Major depressive disorder, single episode, mild 6/23/2016    Nuclear sclerosis 7/21/2014    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 7/21/2014    PAD (peripheral artery disease) 11/6/2015    Psychiatric problem     Statin-induced rhabdomyolysis     4/2015     Stroke 4/2011    Type 2 diabetes mellitus without complication, without long-term current use of insulin 2/10/2020    Walker as ambulation aid          Past Surgical History:   Procedure Laterality Date    APPENDECTOMY      BONE MARROW BIOPSY Left 10/3/2019    Procedure: Biopsy-bone marrow;  Surgeon: Jere Tineo MD;  Location: [facility] OR 2ND FLR;  Service: Oncology;  Laterality: Left;    BREAST BIOPSY Left 10/2011    BREAST LUMPECTOMY Left 2011    DCIS    COLONOSCOPY      COLONOSCOPY N/A 1/9/2020    Procedure: COLONOSCOPY;  Surgeon: Quang De La Cruz MD;  Location: [facility] ENDO (4TH FLR);  Service: Endoscopy;  Laterality: N/A;    CYST REMOVAL      back    ESOPHAGOGASTRODUODENOSCOPY  07/2016    duodenal angioectasia    ESOPHAGOGASTRODUODENOSCOPY N/A 1/9/2020    Procedure: EGD (ESOPHAGOGASTRODUODENOSCOPY);  Surgeon: Quang De La Cruz MD;  Location: [facility] ENDO (4TH FLR);  Service: Endoscopy;  Laterality: N/A;    FOOT FRACTURE SURGERY  10/2012    right foot, with R hallux valgus repair    FRACTURE SURGERY Right     broken toe repiar    HEMORRHOID SURGERY      LIVER BIOPSY  04/2015    essentially normal, no fibrosis    TUBAL LIGATION      UPPER GASTROINTESTINAL ENDOSCOPY         Time Tracking:     OT Date of Treatment: 02/17/20  OT Start Time: 1025  OT Stop Time: 1050  OT Total Time (min): 25 min    Billable Minutes:Self Care/Home Management 25    Chyna Saldivar, OT  2/17/2020

## 2020-02-18 PROBLEM — T45.1X5A CHEMOTHERAPY-INDUCED NAUSEA: Status: ACTIVE | Noted: 2020-02-18

## 2020-02-18 PROBLEM — R11.0 CHEMOTHERAPY-INDUCED NAUSEA: Status: ACTIVE | Noted: 2020-02-18

## 2020-02-18 LAB
ALBUMIN SERPL BCP-MCNC: 3.2 G/DL (ref 3.5–5.2)
ALP SERPL-CCNC: 55 U/L (ref 55–135)
ALT SERPL W/O P-5'-P-CCNC: 11 U/L (ref 10–44)
ANION GAP SERPL CALC-SCNC: 8 MMOL/L (ref 8–16)
ANISOCYTOSIS BLD QL SMEAR: SLIGHT
AST SERPL-CCNC: 15 U/L (ref 10–40)
BASOPHILS # BLD AUTO: 0 K/UL (ref 0–0.2)
BASOPHILS NFR BLD: 0 % (ref 0–1.9)
BILIRUB SERPL-MCNC: 0.4 MG/DL (ref 0.1–1)
BUN SERPL-MCNC: 12 MG/DL (ref 8–23)
CALCIUM SERPL-MCNC: 8.4 MG/DL (ref 8.7–10.5)
CHLORIDE SERPL-SCNC: 107 MMOL/L (ref 95–110)
CO2 SERPL-SCNC: 26 MMOL/L (ref 23–29)
CREAT SERPL-MCNC: 0.8 MG/DL (ref 0.5–1.4)
DIFFERENTIAL METHOD: ABNORMAL
EOSINOPHIL # BLD AUTO: 0 K/UL (ref 0–0.5)
EOSINOPHIL NFR BLD: 7.1 % (ref 0–8)
ERYTHROCYTE [DISTWIDTH] IN BLOOD BY AUTOMATED COUNT: 19.9 % (ref 11.5–14.5)
EST. GFR  (AFRICAN AMERICAN): >60 ML/MIN/1.73 M^2
EST. GFR  (NON AFRICAN AMERICAN): >60 ML/MIN/1.73 M^2
GLUCOSE SERPL-MCNC: 85 MG/DL (ref 70–110)
HCT VFR BLD AUTO: 27.1 % (ref 37–48.5)
HGB BLD-MCNC: 8.2 G/DL (ref 12–16)
HYPOCHROMIA BLD QL SMEAR: ABNORMAL
IMM GRANULOCYTES # BLD AUTO: 0 K/UL (ref 0–0.04)
IMM GRANULOCYTES NFR BLD AUTO: 0 % (ref 0–0.5)
LYMPHOCYTES # BLD AUTO: 0 K/UL (ref 1–4.8)
LYMPHOCYTES NFR BLD: 14.3 % (ref 18–48)
MAGNESIUM SERPL-MCNC: 1.9 MG/DL (ref 1.6–2.6)
MCH RBC QN AUTO: 25.1 PG (ref 27–31)
MCHC RBC AUTO-ENTMCNC: 30.3 G/DL (ref 32–36)
MCV RBC AUTO: 83 FL (ref 82–98)
MONOCYTES # BLD AUTO: 0 K/UL (ref 0.3–1)
MONOCYTES NFR BLD: 14.3 % (ref 4–15)
NEUTROPHILS # BLD AUTO: 0.1 K/UL (ref 1.8–7.7)
NEUTROPHILS NFR BLD: 64.3 % (ref 38–73)
NRBC BLD-RTO: 0 /100 WBC
OVALOCYTES BLD QL SMEAR: ABNORMAL
PHOSPHATE SERPL-MCNC: 3.9 MG/DL (ref 2.7–4.5)
PLATELET # BLD AUTO: 108 K/UL (ref 150–350)
PLATELET BLD QL SMEAR: ABNORMAL
PMV BLD AUTO: 11.7 FL (ref 9.2–12.9)
POIKILOCYTOSIS BLD QL SMEAR: SLIGHT
POTASSIUM SERPL-SCNC: 3.9 MMOL/L (ref 3.5–5.1)
PROT SERPL-MCNC: 5.7 G/DL (ref 6–8.4)
RBC # BLD AUTO: 3.27 M/UL (ref 4–5.4)
SODIUM SERPL-SCNC: 141 MMOL/L (ref 136–145)
WBC # BLD AUTO: 0.14 K/UL (ref 3.9–12.7)

## 2020-02-18 PROCEDURE — 80053 COMPREHEN METABOLIC PANEL: CPT | Mod: HCNC

## 2020-02-18 PROCEDURE — 94761 N-INVAS EAR/PLS OXIMETRY MLT: CPT | Mod: HCNC

## 2020-02-18 PROCEDURE — 63600175 PHARM REV CODE 636 W HCPCS: Mod: HCNC | Performed by: NURSE PRACTITIONER

## 2020-02-18 PROCEDURE — 99233 SBSQ HOSP IP/OBS HIGH 50: CPT | Mod: HCNC,,, | Performed by: INTERNAL MEDICINE

## 2020-02-18 PROCEDURE — 83735 ASSAY OF MAGNESIUM: CPT | Mod: HCNC

## 2020-02-18 PROCEDURE — 25000003 PHARM REV CODE 250: Mod: HCNC | Performed by: INTERNAL MEDICINE

## 2020-02-18 PROCEDURE — 27000221 HC OXYGEN, UP TO 24 HOURS: Mod: HCNC

## 2020-02-18 PROCEDURE — 94799 UNLISTED PULMONARY SVC/PX: CPT | Mod: HCNC

## 2020-02-18 PROCEDURE — 63600175 PHARM REV CODE 636 W HCPCS: Mod: HCNC | Performed by: INTERNAL MEDICINE

## 2020-02-18 PROCEDURE — 97803 MED NUTRITION INDIV SUBSEQ: CPT | Mod: HCNC

## 2020-02-18 PROCEDURE — 20600001 HC STEP DOWN PRIVATE ROOM: Mod: HCNC

## 2020-02-18 PROCEDURE — 84100 ASSAY OF PHOSPHORUS: CPT | Mod: HCNC

## 2020-02-18 PROCEDURE — 85025 COMPLETE CBC W/AUTO DIFF WBC: CPT | Mod: HCNC

## 2020-02-18 PROCEDURE — 25000003 PHARM REV CODE 250: Mod: HCNC | Performed by: NURSE PRACTITIONER

## 2020-02-18 PROCEDURE — 99233 PR SUBSEQUENT HOSPITAL CARE,LEVL III: ICD-10-PCS | Mod: HCNC,,, | Performed by: INTERNAL MEDICINE

## 2020-02-18 PROCEDURE — A9155 ARTIFICIAL SALIVA: HCPCS | Mod: HCNC | Performed by: INTERNAL MEDICINE

## 2020-02-18 RX ADMIN — GABAPENTIN 300 MG: 300 CAPSULE ORAL at 08:02

## 2020-02-18 RX ADMIN — FLUCONAZOLE 400 MG: 200 TABLET ORAL at 08:02

## 2020-02-18 RX ADMIN — HEPARIN SODIUM 1600 UNITS: 1000 INJECTION, SOLUTION INTRAVENOUS; SUBCUTANEOUS at 08:02

## 2020-02-18 RX ADMIN — GABAPENTIN 600 MG: 300 CAPSULE ORAL at 08:02

## 2020-02-18 RX ADMIN — HEPARIN SODIUM 1600 UNITS: 1000 INJECTION, SOLUTION INTRAVENOUS; SUBCUTANEOUS at 10:02

## 2020-02-18 RX ADMIN — AMLODIPINE BESYLATE 5 MG: 5 TABLET ORAL at 08:02

## 2020-02-18 RX ADMIN — ACYCLOVIR 800 MG: 200 CAPSULE ORAL at 08:02

## 2020-02-18 RX ADMIN — PANTOPRAZOLE SODIUM 40 MG: 40 TABLET, DELAYED RELEASE ORAL at 08:02

## 2020-02-18 RX ADMIN — PROCHLORPERAZINE EDISYLATE 10 MG: 5 INJECTION INTRAMUSCULAR; INTRAVENOUS at 10:02

## 2020-02-18 RX ADMIN — ONDANSETRON 8 MG: 2 INJECTION INTRAMUSCULAR; INTRAVENOUS at 08:02

## 2020-02-18 RX ADMIN — HEPARIN SODIUM 1600 UNITS: 1000 INJECTION, SOLUTION INTRAVENOUS; SUBCUTANEOUS at 04:02

## 2020-02-18 RX ADMIN — ENOXAPARIN SODIUM 40 MG: 100 INJECTION SUBCUTANEOUS at 05:02

## 2020-02-18 RX ADMIN — Medication 30 ML: at 08:02

## 2020-02-18 RX ADMIN — ONDANSETRON 8 MG: 2 INJECTION INTRAMUSCULAR; INTRAVENOUS at 03:02

## 2020-02-18 RX ADMIN — OYSTER SHELL CALCIUM WITH VITAMIN D 1 TABLET: 500; 200 TABLET, FILM COATED ORAL at 08:02

## 2020-02-18 RX ADMIN — LEVOFLOXACIN 500 MG: 500 TABLET, FILM COATED ORAL at 08:02

## 2020-02-18 RX ADMIN — SERTRALINE HYDROCHLORIDE 50 MG: 50 TABLET ORAL at 08:02

## 2020-02-18 RX ADMIN — Medication 30 ML: at 05:02

## 2020-02-18 NOTE — ASSESSMENT & PLAN NOTE
-Today is day +6  -received Melphalan 140mg/m2 without difficulty  -stem cell infusion was on 2/12 without issue, received 3 bags (2.85 x 10^6) CD 34 stem cells  -some expected fatigue, poor appetite, not watery diarrhea, and nausea which is improved today with scheduled zofran    Regimen as below:  Transplant Day -2: IV fluids  Transplant Day -1 through Day 0: Antiemetics   Transplant Day -1: Melphalan   Transplant Day 0: Stem Cell Infusion   Transplant Day +7: Growth factor begins

## 2020-02-18 NOTE — PROGRESS NOTES
"[facility]  Adult Nutrition  Progress Note    SUMMARY       Recommendations    Recommendation:  1. Continue regular diet, encourage good PO intake of high calorie high protein foods   2. Continue boost glucose to increase intake    -If pt dislikes ONS, add boost pudding to increase intake   3. RD to monitor and follow up     Goals: pt to tolerate >85% of EEN/EPN by RD follow up   Nutrition Goal Status: progressing towards goal  Communication of RD Recs: other (comment)(POC)    Reason for Assessment    Reason For Assessment: RD follow-up  Diagnosis: (Multiple myeloma)  Relevant Medical History: HTN; HLD; ICH; breast cancer; DM2; CHF  Interdisciplinary Rounds: attended  General Information Comments: Pt reported appetite fair today, PO ~25-50% of meals recently. Pt with snacks to bedside and states she dislikes boost, agrees to try chocolate flavor. Encouraged intake as tolerable and ONS to increase intake. C/o nausea and diarrhea at this time. Pt with wt loss of ~6 lbs since admit. UBW ~170 lbs reported previously. NFPE not completed at this time, pt denies due to pain. WIll continue to assess on follow up=.   Nutrition Discharge Planning: adequate intake via PO diet     Nutrition Risk Screen    Nutrition Risk Screen: no indicators present    Nutrition/Diet History    Spiritual, Cultural Beliefs, Congregational Practices, Values that Affect Care: no  Food Allergies: NKFA  Factors Affecting Nutritional Intake: decreased appetite, nausea/vomiting, diarrhea    Anthropometrics    Temp: 98.3 °F (36.8 °C)  Height Method: Stated  Height: 5' 3" (160 cm)  Height (inches): 63 in  Weight Method: Standard Scale  Weight: 71.2 kg (156 lb 13.7 oz)  Weight (lb): 156.86 lb  Ideal Body Weight (IBW), Female: 115 lb  % Ideal Body Weight, Female (lb): 142.82 %  BMI (Calculated): 27.8  BMI Grade: 25 - 29.9 - overweight       Lab/Procedures/Meds    Pertinent Labs Reviewed: reviewed  Pertinent Labs Comments: Ca 8.4  Pertinent " Medications Reviewed: reviewed  Pertinent Medications Comments: acyclovir; pantoprazole; gabapentin; fluconazole; calcium -vitamin D3    Estimated/Assessed Needs    Weight Used For Calorie Calculations: 73.4 kg (161 lb 13.1 oz)  Energy Calorie Requirements (kcal): 7934-8345 kcal/d  Energy Need Method: Kcal/kg  Protein Requirements:  g/day   Weight Used For Protein Calculations: 73.4 kg (161 lb 13.1 oz)  Fluid Requirements (mL): 1 mL/kcal or per MD  RDA Method (mL): 2202    Nutrition Prescription Ordered    Current Diet Order: Regular diet   Oral Nutrition Supplement: boost glucose chocolate     Evaluation of Received Nutrient/Fluid Intake    I/O: -2.1 L since admit   Energy Calories Required: meeting needs  Protein Required: not meeting needs  Fluid Required: meeting needs  Comments: LBM 2/17  Tolerance: tolerating  % Intake of Estimated Energy Needs: 25 - 50 %  % Meal Intake: 25 - 50 %    Nutrition Risk    Level of Risk/Frequency of Follow-up: low     Assessment and Plan  Nutrition Problem  increased nutrient needs     Related to (etiology):   Physiological needs 2/2 multiple myeloma      Signs and Symptoms (as evidenced by):   Pt to receive BMT       Interventions  (treatment strategy):  Collaboration of care with other providers  Commercial beverage     Nutrition Diagnosis Status:   Continues     Monitor and Evaluation    Food and Nutrient Intake: energy intake, food and beverage intake  Food and Nutrient Adminstration: diet order  Knowledge/Beliefs/Attitudes: food and nutrition knowledge/skill  Anthropometric Measurements: weight, weight change  Biochemical Data, Medical Tests and Procedures: electrolyte and renal panel, lipid profile, gastrointestinal profile, glucose/endocrine profile, inflammatory profile  Nutrition-Focused Physical Findings: overall appearance     Nutrition Follow-Up    RD Follow-up?: Yes

## 2020-02-18 NOTE — ASSESSMENT & PLAN NOTE
-hx of JESSE; received single dose feraheme pre transplant  -colonoscopy okay for transplant  -will monitor daily CBC inpatient  -transfuse for Hgb <7, Plt <10K or bleeding  -wbc 0.14, hgb 8.2, plt 108K

## 2020-02-18 NOTE — PROGRESS NOTES
[facility]  Hematology  Bone Marrow Transplant  Progress Note    Patient Name: Shelby Thompson  Admission Date: 2/10/2020  Hospital Length of Stay: 8 days  Code Status: Full Code    Subjective:     Interval History:  Day +6 Verenice auto. VSS, afebrile, ANC 0, reports nausea this am and diarrhea controlled. Denies pain or mouth sores.    Objective:     Vital Signs (Most Recent):  Temp: 98.5 °F (36.9 °C) (02/18/20 0715)  Pulse: 62 (02/18/20 0715)  Resp: 15 (02/18/20 0715)  BP: 106/62 (02/18/20 0715)  SpO2: (Abnormal) 94 % (02/18/20 0715) Vital Signs (24h Range):  Temp:  [98 °F (36.7 °C)-98.5 °F (36.9 °C)] 98.5 °F (36.9 °C)  Pulse:  [62-77] 62  Resp:  [15-18] 15  SpO2:  [94 %-98 %] 94 %  BP: (103-130)/(54-73) 106/62     Weight: 71.2 kg (156 lb 13.7 oz)  Body mass index is 27.79 kg/m².  Body surface area is 1.78 meters squared.    Intake/Output - Last 3 Shifts       02/16 0700 - 02/17 0659 02/17 0700 - 02/18 0659 02/18 0700 - 02/19 0659    P.O. 845 720     Total Intake(mL/kg) 845 (11.8) 720 (10.1)     Urine (mL/kg/hr) 850 (0.5) 1250 (0.7)     Stool 0 0     Total Output 850 1250     Net -5 -530            Urine Occurrence 2 x 3 x     Stool Occurrence 2 x 3 x           Physical Exam   Constitutional: She is oriented to person, place, and time. Vital signs are normal. She is cooperative.   HENT:   Head: Normocephalic.   Eyes: Lids are normal.   Neck: Trachea normal and normal range of motion.   Cardiovascular: Normal rate, regular rhythm, S1 normal, S2 normal and normal heart sounds.   Pulmonary/Chest: Effort normal and breath sounds normal.   Abdominal: Soft. Normal appearance and bowel sounds are normal.   Musculoskeletal: Normal range of motion.   Neurological: She is alert and oriented to person, place, and time. Coordination and gait normal.   Skin: Skin is dry and intact. She is not diaphoretic. No pallor.   vascath to chest wall c/d/i   Psychiatric: She has a normal mood and affect. Her speech is  normal and behavior is normal. Judgment and thought content normal. Cognition and memory are normal.       Significant Labs:   CBC:   Recent Labs   Lab 02/17/20  0300 02/18/20  0400   WBC 0.24* 0.14*   HGB 8.3* 8.2*   HCT 27.9* 27.1*   * 108*    and CMP:   Recent Labs   Lab 02/17/20  0300 02/18/20  0400    141   K 3.7 3.9    107   CO2 28 26   GLU 80 85   BUN 13 12   CREATININE 0.8 0.8   CALCIUM 8.3* 8.4*   PROT 5.7* 5.7*   ALBUMIN 3.2* 3.2*   BILITOT 0.4 0.4   ALKPHOS 56 55   AST 15 15   ALT 11 11   ANIONGAP 6* 8   EGFRNONAA >60.0 >60.0       Diagnostic Results:  None    Assessment/Plan:     * Status post autologous bone marrow transplant  -Today is day +6  -received Melphalan 140mg/m2 without difficulty  -stem cell infusion was on 2/12 without issue, received 3 bags (2.85 x 10^6) CD 34 stem cells  -some expected fatigue, poor appetite, not watery diarrhea, and nausea which is improved today with scheduled zofran    Regimen as below:  Transplant Day -2: IV fluids  Transplant Day -1 through Day 0: Antiemetics   Transplant Day -1: Melphalan   Transplant Day 0: Stem Cell Infusion   Transplant Day +7: Growth factor begins    Multiple myeloma in remission  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) High Risk disease; complex karyotype by CG; , t(14;20), monosomy 13 on FISH  -She has achieved and tolerated well VRd x completing 7, 28 day cycles and achieving stringent CR as of Dec 2019 marrow/biochemical studies   -adequate response to proceed with autoSCT  -she has PS 1-2 and multiple abnormalities in her pre transplant eval that have been cleared by pulmonology, cardiology, gynecology and are not prohibitive to proceed with transplant  -she has JESSE - sp  one dose of iv feraheme with response  will need reassessment by GI; if colonosocpy normal  proceed with transplant   -no further velcade maintenance pre transplant, last dose was 12/24/19  -plan for  reduced dose clifton 140mg/m2 in light of age    Chemotherapy-induced nausea  - continue scheduled Zofran  - PRN compazine and Ativan    Type 2 diabetes mellitus without complication, without long-term current use of insulin  -monitor closely while inpatient; not on active medication     Essential hypertension  -continue norvasc       Drug-induced polyneuropathy  -pt has had numbness/tingling to hands and BUE tremors post CVA, for which she is on gabapentin 600 mg BID  - prn tramadol     Pancytopenia due to antineoplastic chemotherapy  -hx of JESSE; received single dose feraheme pre transplant  -colonoscopy okay for transplant  -will monitor daily CBC inpatient  -transfuse for Hgb <7, Plt <10K or bleeding  -wbc 0.14, hgb 8.2, plt 108K    Interstitial lung disease  - had abnormal PFTs during transplant evaluation 10/2019; seen by pulm; no significant desaturation on 6MWT; okay per transplant per pulm     Recurrent major depressive disorder, in full remission  -continue zoloft     Liver mass  -vascular per recent MRI in 09/2019 with no changes; seen by hepatology pre-transplant and signed off; recommend repeat MRI in 6 months     History of CVA with residual deficit  -one in 2008, one in 2011  -has been cleared by cardiology for transplant     Hyperlipemia  -on praluent, PCSK9 inhibitor; will hold on admit     Personal history of malignant neoplasm of breast  L breast cancer DCIS stage 0  -s/p lumpectomy and radiation, no endocrine tx due to CVA         VTE Risk Mitigation (From admission, onward)         Ordered     heparin (porcine) injection 1,600 Units  As needed (PRN)      02/10/20 2141     enoxaparin injection 40 mg  Daily      02/10/20 1738     IP VTE HIGH RISK PATIENT  Once      02/10/20 1738                Disposition: pending count recovery post transplant    Kathleen Kimball NP  Bone Marrow Transplant  [facility]

## 2020-02-18 NOTE — PLAN OF CARE
Side rails up x2; call bell in place; bed in lowest, locked position; skid proof socks on; no evidence of skin breakdown; care plan explained to patient; pt remains free of injury. Pt is day +6 for an auto stem cell transplant.  Pt tolerates po, voids, loose BM x 1, ambulated to BR with walker. Scheduled zofran given as scheduled, pt denies nausea. Pt denies diarrhea, pain or mouth sores. Instructed pt to call with any needs or concerns. VSS and afebrile.

## 2020-02-18 NOTE — PLAN OF CARE
"Pt AAO; independent with walker at bedside for use oob. . RN encouraged use of walker OOB  Vital signs stable and pt remained afebrile throughout shift. Patient slept on and off most of the shift stateting she was "tired today"  No complaints of pain. Some intermittent nausea , scheduled  nausea meds given with additional compazine given No emesis.  Pt remained free from falls during shift.  Bed lowest position and locked.  Call light in reach.  WCTM   "

## 2020-02-18 NOTE — SUBJECTIVE & OBJECTIVE
Subjective:     Interval History:  Day +6 Verenice auto. VSS, afebrile, ANC 0, reports nausea this am and diarrhea controlled. Denies pain or mouth sores.    Objective:     Vital Signs (Most Recent):  Temp: 98.5 °F (36.9 °C) (02/18/20 0715)  Pulse: 62 (02/18/20 0715)  Resp: 15 (02/18/20 0715)  BP: 106/62 (02/18/20 0715)  SpO2: (Abnormal) 94 % (02/18/20 0715) Vital Signs (24h Range):  Temp:  [98 °F (36.7 °C)-98.5 °F (36.9 °C)] 98.5 °F (36.9 °C)  Pulse:  [62-77] 62  Resp:  [15-18] 15  SpO2:  [94 %-98 %] 94 %  BP: (103-130)/(54-73) 106/62     Weight: 71.2 kg (156 lb 13.7 oz)  Body mass index is 27.79 kg/m².  Body surface area is 1.78 meters squared.    Intake/Output - Last 3 Shifts       02/16 0700 - 02/17 0659 02/17 0700 - 02/18 0659 02/18 0700 - 02/19 0659    P.O. 845 720     Total Intake(mL/kg) 845 (11.8) 720 (10.1)     Urine (mL/kg/hr) 850 (0.5) 1250 (0.7)     Stool 0 0     Total Output 850 1250     Net -5 -530            Urine Occurrence 2 x 3 x     Stool Occurrence 2 x 3 x           Physical Exam   Constitutional: She is oriented to person, place, and time. Vital signs are normal. She is cooperative.   HENT:   Head: Normocephalic.   Eyes: Lids are normal.   Neck: Trachea normal and normal range of motion.   Cardiovascular: Normal rate, regular rhythm, S1 normal, S2 normal and normal heart sounds.   Pulmonary/Chest: Effort normal and breath sounds normal.   Abdominal: Soft. Normal appearance and bowel sounds are normal.   Musculoskeletal: Normal range of motion.   Neurological: She is alert and oriented to person, place, and time. Coordination and gait normal.   Skin: Skin is dry and intact. She is not diaphoretic. No pallor.   vascath to chest wall c/d/i   Psychiatric: She has a normal mood and affect. Her speech is normal and behavior is normal. Judgment and thought content normal. Cognition and memory are normal.       Significant Labs:   CBC:   Recent Labs   Lab 02/17/20  0300 02/18/20  0400   WBC 0.24* 0.14*    HGB 8.3* 8.2*   HCT 27.9* 27.1*   * 108*    and CMP:   Recent Labs   Lab 02/17/20  0300 02/18/20  0400    141   K 3.7 3.9    107   CO2 28 26   GLU 80 85   BUN 13 12   CREATININE 0.8 0.8   CALCIUM 8.3* 8.4*   PROT 5.7* 5.7*   ALBUMIN 3.2* 3.2*   BILITOT 0.4 0.4   ALKPHOS 56 55   AST 15 15   ALT 11 11   ANIONGAP 6* 8   EGFRNONAA >60.0 >60.0       Diagnostic Results:  None

## 2020-02-18 NOTE — PT/OT/SLP PROGRESS
Physical Therapy      Patient Name:  Shelby Thompson   MRN:  [id number]    Patient not seen today secondary to pt not feeling well upon attempt  . Will follow-up next scheduled treatment per PT POC    Lester Masters, PTA

## 2020-02-19 ENCOUNTER — TELEPHONE (OUTPATIENT)
Dept: PHARMACY | Facility: CLINIC | Age: 70
End: 2020-02-19

## 2020-02-19 LAB
ALBUMIN SERPL BCP-MCNC: 3.3 G/DL (ref 3.5–5.2)
ALP SERPL-CCNC: 56 U/L (ref 55–135)
ALT SERPL W/O P-5'-P-CCNC: 11 U/L (ref 10–44)
ANION GAP SERPL CALC-SCNC: 10 MMOL/L (ref 8–16)
ANISOCYTOSIS BLD QL SMEAR: SLIGHT
AST SERPL-CCNC: 16 U/L (ref 10–40)
BASOPHILS # BLD AUTO: 0 K/UL (ref 0–0.2)
BASOPHILS NFR BLD: 0 % (ref 0–1.9)
BILIRUB SERPL-MCNC: 0.3 MG/DL (ref 0.1–1)
BUN SERPL-MCNC: 12 MG/DL (ref 8–23)
CALCIUM SERPL-MCNC: 8.1 MG/DL (ref 8.7–10.5)
CHLORIDE SERPL-SCNC: 107 MMOL/L (ref 95–110)
CO2 SERPL-SCNC: 24 MMOL/L (ref 23–29)
CREAT SERPL-MCNC: 0.8 MG/DL (ref 0.5–1.4)
DIFFERENTIAL METHOD: ABNORMAL
EOSINOPHIL # BLD AUTO: 0 K/UL (ref 0–0.5)
EOSINOPHIL NFR BLD: 0 % (ref 0–8)
ERYTHROCYTE [DISTWIDTH] IN BLOOD BY AUTOMATED COUNT: 19.8 % (ref 11.5–14.5)
EST. GFR  (AFRICAN AMERICAN): >60 ML/MIN/1.73 M^2
EST. GFR  (NON AFRICAN AMERICAN): >60 ML/MIN/1.73 M^2
GLUCOSE SERPL-MCNC: 86 MG/DL (ref 70–110)
HCT VFR BLD AUTO: 27.7 % (ref 37–48.5)
HGB BLD-MCNC: 8.3 G/DL (ref 12–16)
HYPOCHROMIA BLD QL SMEAR: ABNORMAL
IMM GRANULOCYTES # BLD AUTO: 0 K/UL (ref 0–0.04)
IMM GRANULOCYTES NFR BLD AUTO: 0 % (ref 0–0.5)
LYMPHOCYTES # BLD AUTO: 0 K/UL (ref 1–4.8)
LYMPHOCYTES NFR BLD: 25 % (ref 18–48)
MAGNESIUM SERPL-MCNC: 1.8 MG/DL (ref 1.6–2.6)
MCH RBC QN AUTO: 24.6 PG (ref 27–31)
MCHC RBC AUTO-ENTMCNC: 30 G/DL (ref 32–36)
MCV RBC AUTO: 82 FL (ref 82–98)
MONOCYTES # BLD AUTO: 0 K/UL (ref 0.3–1)
MONOCYTES NFR BLD: 25 % (ref 4–15)
NEUTROPHILS # BLD AUTO: 0 K/UL (ref 1.8–7.7)
NEUTROPHILS NFR BLD: 50 % (ref 38–73)
NRBC BLD-RTO: 0 /100 WBC
OVALOCYTES BLD QL SMEAR: ABNORMAL
PHOSPHATE SERPL-MCNC: 3.5 MG/DL (ref 2.7–4.5)
PLATELET # BLD AUTO: 87 K/UL (ref 150–350)
PMV BLD AUTO: 11.5 FL (ref 9.2–12.9)
POIKILOCYTOSIS BLD QL SMEAR: SLIGHT
POLYCHROMASIA BLD QL SMEAR: ABNORMAL
POTASSIUM SERPL-SCNC: 3.6 MMOL/L (ref 3.5–5.1)
PROT SERPL-MCNC: 5.8 G/DL (ref 6–8.4)
RBC # BLD AUTO: 3.37 M/UL (ref 4–5.4)
SODIUM SERPL-SCNC: 141 MMOL/L (ref 136–145)
WBC # BLD AUTO: 0.08 K/UL (ref 3.9–12.7)

## 2020-02-19 PROCEDURE — 63600175 PHARM REV CODE 636 W HCPCS: Mod: HCNC | Performed by: NURSE PRACTITIONER

## 2020-02-19 PROCEDURE — 99233 PR SUBSEQUENT HOSPITAL CARE,LEVL III: ICD-10-PCS | Mod: HCNC,,, | Performed by: INTERNAL MEDICINE

## 2020-02-19 PROCEDURE — 97116 GAIT TRAINING THERAPY: CPT | Mod: HCNC,CQ

## 2020-02-19 PROCEDURE — 20600001 HC STEP DOWN PRIVATE ROOM: Mod: HCNC

## 2020-02-19 PROCEDURE — 25000003 PHARM REV CODE 250: Mod: HCNC | Performed by: INTERNAL MEDICINE

## 2020-02-19 PROCEDURE — 85025 COMPLETE CBC W/AUTO DIFF WBC: CPT | Mod: HCNC

## 2020-02-19 PROCEDURE — 63600175 PHARM REV CODE 636 W HCPCS: Mod: HCNC | Performed by: INTERNAL MEDICINE

## 2020-02-19 PROCEDURE — 99233 SBSQ HOSP IP/OBS HIGH 50: CPT | Mod: HCNC,,, | Performed by: INTERNAL MEDICINE

## 2020-02-19 PROCEDURE — 63600175 PHARM REV CODE 636 W HCPCS: Mod: JG,HCNC | Performed by: INTERNAL MEDICINE

## 2020-02-19 PROCEDURE — 25000003 PHARM REV CODE 250: Mod: HCNC | Performed by: NURSE PRACTITIONER

## 2020-02-19 PROCEDURE — 80053 COMPREHEN METABOLIC PANEL: CPT | Mod: HCNC

## 2020-02-19 PROCEDURE — A9155 ARTIFICIAL SALIVA: HCPCS | Mod: HCNC | Performed by: INTERNAL MEDICINE

## 2020-02-19 PROCEDURE — 97110 THERAPEUTIC EXERCISES: CPT | Mod: HCNC,CQ

## 2020-02-19 PROCEDURE — 84100 ASSAY OF PHOSPHORUS: CPT | Mod: HCNC

## 2020-02-19 PROCEDURE — 83735 ASSAY OF MAGNESIUM: CPT | Mod: HCNC

## 2020-02-19 PROCEDURE — 94761 N-INVAS EAR/PLS OXIMETRY MLT: CPT | Mod: HCNC

## 2020-02-19 RX ADMIN — POTASSIUM CHLORIDE 20 MEQ: 1500 TABLET, EXTENDED RELEASE ORAL at 06:02

## 2020-02-19 RX ADMIN — SERTRALINE HYDROCHLORIDE 50 MG: 50 TABLET ORAL at 08:02

## 2020-02-19 RX ADMIN — Medication 30 ML: at 12:02

## 2020-02-19 RX ADMIN — FLUCONAZOLE 400 MG: 200 TABLET ORAL at 08:02

## 2020-02-19 RX ADMIN — PANTOPRAZOLE SODIUM 40 MG: 40 TABLET, DELAYED RELEASE ORAL at 08:02

## 2020-02-19 RX ADMIN — ONDANSETRON 8 MG: 2 INJECTION INTRAMUSCULAR; INTRAVENOUS at 09:02

## 2020-02-19 RX ADMIN — FILGRASTIM 300 MCG: 480 INJECTION, SOLUTION INTRAVENOUS; SUBCUTANEOUS at 08:02

## 2020-02-19 RX ADMIN — GABAPENTIN 600 MG: 300 CAPSULE ORAL at 09:02

## 2020-02-19 RX ADMIN — Medication 30 ML: at 09:02

## 2020-02-19 RX ADMIN — Medication 30 ML: at 04:02

## 2020-02-19 RX ADMIN — HEPARIN SODIUM 1600 UNITS: 1000 INJECTION, SOLUTION INTRAVENOUS; SUBCUTANEOUS at 09:02

## 2020-02-19 RX ADMIN — GABAPENTIN 300 MG: 300 CAPSULE ORAL at 08:02

## 2020-02-19 RX ADMIN — Medication 400 MG: at 11:02

## 2020-02-19 RX ADMIN — ACYCLOVIR 800 MG: 200 CAPSULE ORAL at 08:02

## 2020-02-19 RX ADMIN — Medication 30 ML: at 08:02

## 2020-02-19 RX ADMIN — HEPARIN SODIUM 1600 UNITS: 1000 INJECTION, SOLUTION INTRAVENOUS; SUBCUTANEOUS at 03:02

## 2020-02-19 RX ADMIN — ONDANSETRON 8 MG: 2 INJECTION INTRAMUSCULAR; INTRAVENOUS at 03:02

## 2020-02-19 RX ADMIN — ONDANSETRON 8 MG: 2 INJECTION INTRAMUSCULAR; INTRAVENOUS at 08:02

## 2020-02-19 RX ADMIN — ACYCLOVIR 800 MG: 200 CAPSULE ORAL at 09:02

## 2020-02-19 RX ADMIN — OYSTER SHELL CALCIUM WITH VITAMIN D 1 TABLET: 500; 200 TABLET, FILM COATED ORAL at 09:02

## 2020-02-19 RX ADMIN — Medication 400 MG: at 06:02

## 2020-02-19 RX ADMIN — ENOXAPARIN SODIUM 40 MG: 100 INJECTION SUBCUTANEOUS at 04:02

## 2020-02-19 RX ADMIN — OYSTER SHELL CALCIUM WITH VITAMIN D 1 TABLET: 500; 200 TABLET, FILM COATED ORAL at 08:02

## 2020-02-19 RX ADMIN — LEVOFLOXACIN 500 MG: 500 TABLET, FILM COATED ORAL at 08:02

## 2020-02-19 NOTE — PLAN OF CARE
POC reviewed with patient. Pt AAO; independent with walker at bedside for use oob. Vital signs stable and pt remained afebrile throughout shift. No complaints of pain. No complaints of nausea this shift.  Pt remained free from falls during shift.  Bed lowest position and locked.  Call light/belongings in reach. Will continue to monitor.

## 2020-02-19 NOTE — ASSESSMENT & PLAN NOTE
-Today is day +7  -received Melphalan 140mg/m2 without difficulty  -stem cell infusion was on 2/12 without issue, received 3 bags (2.85 x 10^6) CD 34 stem cells  -some expected fatigue, poor appetite, not watery diarrhea, and nausea which is improved with scheduled zofran and PRN Compazine    Regimen as below:  Transplant Day -2: IV fluids  Transplant Day -1 through Day 0: Antiemetics   Transplant Day -1: Melphalan   Transplant Day 0: Stem Cell Infusion   Transplant Day +7: Growth factor begins

## 2020-02-19 NOTE — SUBJECTIVE & OBJECTIVE
Subjective:     Interval History:  Day +7 Verenice auto, ANC 40 starting Neupogen today. Afebrile, VSS. Nausea and diarrhea controlled.       Objective:     Vital Signs (Most Recent):  Temp: 98 °F (36.7 °C) (02/19/20 0746)  Pulse: 72 (02/19/20 0746)  Resp: 18 (02/19/20 0746)  BP: 101/65 (02/19/20 0746)  SpO2: 100 % (02/19/20 0746) Vital Signs (24h Range):  Temp:  [97.5 °F (36.4 °C)-98.6 °F (37 °C)] 98 °F (36.7 °C)  Pulse:  [59-79] 72  Resp:  [14-18] 18  SpO2:  [93 %-100 %] 100 %  BP: ()/(55-66) 101/65     Weight: 70.2 kg (154 lb 14 oz)  Body mass index is 27.43 kg/m².  Body surface area is 1.77 meters squared.      Intake/Output - Last 3 Shifts       02/17 0700 - 02/18 0659 02/18 0700 - 02/19 0659 02/19 0700 - 02/20 0659    P.O. 720      Total Intake(mL/kg) 720 (10.1)      Urine (mL/kg/hr) 1250 (0.7) 700 (0.4) 400 (2.6)    Stool 0      Total Output 1250 700 400    Net -530 -700 -400           Urine Occurrence 3 x      Stool Occurrence 3 x            Physical Exam   Constitutional: She is oriented to person, place, and time. Vital signs are normal. She is cooperative.   HENT:   Head: Normocephalic.   Eyes: Lids are normal.   Neck: Trachea normal and normal range of motion.   Cardiovascular: Normal rate, regular rhythm, S1 normal, S2 normal and normal heart sounds.   Pulmonary/Chest: Effort normal and breath sounds normal.   Abdominal: Soft. Normal appearance and bowel sounds are normal.   Musculoskeletal: Normal range of motion.   Neurological: She is alert and oriented to person, place, and time. Coordination and gait normal.   Skin: Skin is dry and intact. She is not diaphoretic. No pallor.   vascath to chest wall c/d/i   Psychiatric: She has a normal mood and affect. Her speech is normal and behavior is normal. Judgment and thought content normal. Cognition and memory are normal.       Significant Labs:   CBC:   Recent Labs   Lab 02/18/20  0400 02/19/20  0403   WBC 0.14* 0.08*   HGB 8.2* 8.3*   HCT 27.1* 27.7*    * 87*    and CMP:   Recent Labs   Lab 02/18/20  0400 02/19/20  0403    141   K 3.9 3.6    107   CO2 26 24   GLU 85 86   BUN 12 12   CREATININE 0.8 0.8   CALCIUM 8.4* 8.1*   PROT 5.7* 5.8*   ALBUMIN 3.2* 3.3*   BILITOT 0.4 0.3   ALKPHOS 55 56   AST 15 16   ALT 11 11   ANIONGAP 8 10   EGFRNONAA >60.0 >60.0       Diagnostic Results:  None

## 2020-02-19 NOTE — PT/OT/SLP PROGRESS
Physical Therapy Treatment    Patient Name:  Shelby Thompson   MRN:  [id number]    Recommendations:     Discharge Recommendations:  home with home health   Discharge Equipment Recommendations: none   Barriers to discharge: Decreased caregiver support    Assessment:     Shelby Thompson is a 69 y.o. female admitted with a medical diagnosis of Status post autologous bone marrow transplant.  She presents with the following impairments/functional limitations:  impaired endurance, gait instability, impaired functional mobilty, impaired balance Pt tolerated treatment fairly well and is progressing  with mobility. Pt would continue to benefit from skilled PT to address overall functional mobility and goals. Goals remain appropriate   .    Rehab Prognosis: Good; patient would benefit from acute skilled PT services to address these deficits and reach maximum level of function.    Recent Surgery: * No surgery found *      Plan:     During this hospitalization, patient to be seen 2 x/week to address the identified rehab impairments via gait training, therapeutic activities, therapeutic exercises and progress toward the following goals:    · Plan of Care Expires:  03/11/20    Subjective     Patient/Family Comments/goals: I am feeling little better today  Pain/Comfort:  · Pain Rating 1: 0/10  · Pain Rating Post-Intervention 1: 0/10      Objective:     Communicated with RN prior to session.  Patient found up in chair with   upon PT entry to room.     General Precautions: Standard, fall   Orthopedic Precautions:N/A   Braces: N/A     Functional Mobility:  · Transfers:     · Sit to Stand:  supervision with no AD  · Gait: pt amb with RW with SBA/CGA for safety 225 ft   with  with mask and gloves on. Pt with increased BUE tremors noted with gait    AM-PAC 6 CLICK MOBILITY  Turning over in bed (including adjusting bedclothes, sheets and blankets)?: 4  Sitting down on and standing up from a chair with arms (e.g., wheelchair, bedside  commode, etc.): 3  Moving from lying on back to sitting on the side of the bed?: 4  Moving to and from a bed to a chair (including a wheelchair)?: 3  Need to walk in hospital room?: 3  Climbing 3-5 steps with a railing?: 3  Basic Mobility Total Score: 20       Therapeutic Activities and Exercises:   educated patient on progress, safety,d/c,PT POC, on the effects of prolonged immobility and the importance of performing OOB activity and exercises to promote healing and reduce recovery time   Patient performed therex  seated in bedside chair B LE AROM AP, LAQ, Hip Flexion, Hip Abd/Add with facilitation for correct performance and sequencing. Exercises performed to develop and maintain pt's strength, endurance and flexibility.   Updated white board with appropriate PT mobility information for medical team notification  Pt encouraged to ambulate in hallways 3x/day with nursing or family assistance to improve endurance.   Pt safe to ambulate in hallway with RN or PCT assistance   Bedside table in front of patient and area set up for function, convenience, and safety. RN aware of patient's mobility needs and status. Questions/concerns addressed within PTA scope of practice; patient with no further questions. Time was provided for active listening, discussion of health disposition, and discussion of safe discharge. Pt?verbalized?agreement         Patient left up in chair with all lines intact, call button in reach   GOALS:   Multidisciplinary Problems     Physical Therapy Goals        Problem: Physical Therapy Goal    Goal Priority Disciplines Outcome Goal Variances Interventions   Physical Therapy Goal     PT, PT/OT Ongoing, Progressing     Description:  Goals to be met by: 2020     Patient will increase functional independence with mobility by performin. Bed to chair transfer with Supervision using Rolling Walker  2. Gait  x 150 feet with Supervision using Rolling Walker.   3. Ascend/descend 3 stair with  bilateral Handrails Stand-by Assistance using no AD.   4. Lower extremity exercise program x20 reps per handout, with independence                      Time Tracking:     PT Received On: 02/19/20  PT Total Time (min): 23 min     Billable Minutes: Gait Training 15 and Therapeutic Activity 8    Treatment Type: Treatment  PT/PTA: PTA     PTA Visit Number: 2     Lester Masters, PTA  02/19/2020

## 2020-02-19 NOTE — PROGRESS NOTES
[facility]  Hematology  Bone Marrow Transplant  Progress Note    Patient Name: Shelby Thompson  Admission Date: 2/10/2020  Hospital Length of Stay: 9 days  Code Status: Full Code    Subjective:     Interval History:  Day +7 Verenice auto, ANC 40 starting Neupogen today. Afebrile, VSS. Nausea and diarrhea controlled.       Objective:     Vital Signs (Most Recent):  Temp: 98 °F (36.7 °C) (02/19/20 0746)  Pulse: 72 (02/19/20 0746)  Resp: 18 (02/19/20 0746)  BP: 101/65 (02/19/20 0746)  SpO2: 100 % (02/19/20 0746) Vital Signs (24h Range):  Temp:  [97.5 °F (36.4 °C)-98.6 °F (37 °C)] 98 °F (36.7 °C)  Pulse:  [59-79] 72  Resp:  [14-18] 18  SpO2:  [93 %-100 %] 100 %  BP: ()/(55-66) 101/65     Weight: 70.2 kg (154 lb 14 oz)  Body mass index is 27.43 kg/m².  Body surface area is 1.77 meters squared.      Intake/Output - Last 3 Shifts       02/17 0700 - 02/18 0659 02/18 0700 - 02/19 0659 02/19 0700 - 02/20 0659    P.O. 720      Total Intake(mL/kg) 720 (10.1)      Urine (mL/kg/hr) 1250 (0.7) 700 (0.4) 400 (2.6)    Stool 0      Total Output 1250 700 400    Net -530 -700 -400           Urine Occurrence 3 x      Stool Occurrence 3 x            Physical Exam   Constitutional: She is oriented to person, place, and time. Vital signs are normal. She is cooperative.   HENT:   Head: Normocephalic.   Eyes: Lids are normal.   Neck: Trachea normal and normal range of motion.   Cardiovascular: Normal rate, regular rhythm, S1 normal, S2 normal and normal heart sounds.   Pulmonary/Chest: Effort normal and breath sounds normal.   Abdominal: Soft. Normal appearance and bowel sounds are normal.   Musculoskeletal: Normal range of motion.   Neurological: She is alert and oriented to person, place, and time. Coordination and gait normal.   Skin: Skin is dry and intact. She is not diaphoretic. No pallor.   vascath to chest wall c/d/i   Psychiatric: She has a normal mood and affect. Her speech is normal and behavior is normal.  Judgment and thought content normal. Cognition and memory are normal.       Significant Labs:   CBC:   Recent Labs   Lab 02/18/20  0400 02/19/20  0403   WBC 0.14* 0.08*   HGB 8.2* 8.3*   HCT 27.1* 27.7*   * 87*    and CMP:   Recent Labs   Lab 02/18/20  0400 02/19/20  0403    141   K 3.9 3.6    107   CO2 26 24   GLU 85 86   BUN 12 12   CREATININE 0.8 0.8   CALCIUM 8.4* 8.1*   PROT 5.7* 5.8*   ALBUMIN 3.2* 3.3*   BILITOT 0.4 0.3   ALKPHOS 55 56   AST 15 16   ALT 11 11   ANIONGAP 8 10   EGFRNONAA >60.0 >60.0       Diagnostic Results:  None    Assessment/Plan:     * Status post autologous bone marrow transplant  -Today is day +7  -received Melphalan 140mg/m2 without difficulty  -stem cell infusion was on 2/12 without issue, received 3 bags (2.85 x 10^6) CD 34 stem cells  -some expected fatigue, poor appetite, not watery diarrhea, and nausea which is improved with scheduled zofran and PRN Compazine    Regimen as below:  Transplant Day -2: IV fluids  Transplant Day -1 through Day 0: Antiemetics   Transplant Day -1: Melphalan   Transplant Day 0: Stem Cell Infusion   Transplant Day +7: Growth factor begins    Multiple myeloma in remission  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) High Risk disease; complex karyotype by CG; , t(14;20), monosomy 13 on FISH  -She has achieved and tolerated well VRd x completing 7, 28 day cycles and achieving stringent CR as of Dec 2019 marrow/biochemical studies   -adequate response to proceed with autoSCT  -she has PS 1-2 and multiple abnormalities in her pre transplant eval that have been cleared by pulmonology, cardiology, gynecology and are not prohibitive to proceed with transplant  -she has JESSE - sp  one dose of iv feraheme with response  will need reassessment by GI; if colonosocpy normal  proceed with transplant   -no further velcade maintenance pre transplant, last dose was 12/24/19  -plan for reduced dose clifton  140mg/m2 in light of age    Chemotherapy-induced nausea  - continue scheduled Zofran  - PRN compazine and Ativan    Type 2 diabetes mellitus without complication, without long-term current use of insulin  -monitor closely while inpatient; not on active medication     Essential hypertension  -continue norvasc       Drug-induced polyneuropathy  -pt has had numbness/tingling to hands and BUE tremors post CVA, for which she is on gabapentin 600 mg BID  - prn tramadol     Pancytopenia due to antineoplastic chemotherapy  -hx of JESSE; received single dose feraheme pre transplant  -colonoscopy okay for transplant  -will monitor daily CBC inpatient  -transfuse for Hgb <7, Plt <10K or bleeding  -wbc 0.08, hgb 8.3, plt 87K    Interstitial lung disease  - had abnormal PFTs during transplant evaluation 10/2019; seen by pulm; no significant desaturation on 6MWT; okay per transplant per pulm     Recurrent major depressive disorder, in full remission  -continue zoloft     Liver mass  -vascular per recent MRI in 09/2019 with no changes; seen by hepatology pre-transplant and signed off; recommend repeat MRI in 6 months     History of CVA with residual deficit  -one in 2008, one in 2011  -has been cleared by cardiology for transplant     Hyperlipemia  -on praluent, PCSK9 inhibitor; will hold on admit     Personal history of malignant neoplasm of breast  L breast cancer DCIS stage 0  -s/p lumpectomy and radiation, no endocrine tx due to CVA         VTE Risk Mitigation (From admission, onward)         Ordered     heparin (porcine) injection 1,600 Units  As needed (PRN)      02/10/20 2141     enoxaparin injection 40 mg  Daily      02/10/20 1738     IP VTE HIGH RISK PATIENT  Once      02/10/20 1738                Disposition: pending count recovery post transplant    Kathleen Kimball NP  Bone Marrow Transplant  [facility]

## 2020-02-19 NOTE — PLAN OF CARE
Problem: Physical Therapy Goal  Goal: Physical Therapy Goal  Description  Goals to be met by: 2020     Patient will increase functional independence with mobility by performin. Bed to chair transfer with Supervision using Rolling Walker  2. Gait  x 150 feet with Supervision using Rolling Walker.   3. Ascend/descend 3 stair with bilateral Handrails Stand-by Assistance using no AD.   4. Lower extremity exercise program x20 reps per handout, with independence     Outcome: Ongoing, Progressing   Pt progressing towards goals. continue with PT POC.Goals remain appropriate.  Letser Masters PTA

## 2020-02-19 NOTE — PLAN OF CARE
Side rails up x2; call bell in place; bed in lowest, locked position; skid proof socks on; no evidence of skin breakdown; care plan explained to patient; pt remains free of injury.  Pt on day +7 for an auto stem cell transplant. Pt tolerated po, voids, BM x 1, ambulates with a walker. Denies pain, n/v, constipation or diarrhea. Pt instructed to call as needed. Neutropenic precautions maintained.  VSS and afebrile.

## 2020-02-20 LAB
ABO + RH BLD: NORMAL
ALBUMIN SERPL BCP-MCNC: 3.5 G/DL (ref 3.5–5.2)
ALP SERPL-CCNC: 55 U/L (ref 55–135)
ALT SERPL W/O P-5'-P-CCNC: 12 U/L (ref 10–44)
ANION GAP SERPL CALC-SCNC: 7 MMOL/L (ref 8–16)
ANISOCYTOSIS BLD QL SMEAR: SLIGHT
AST SERPL-CCNC: 15 U/L (ref 10–40)
BASOPHILS # BLD AUTO: 0 K/UL (ref 0–0.2)
BASOPHILS NFR BLD: 0 % (ref 0–1.9)
BILIRUB SERPL-MCNC: 0.4 MG/DL (ref 0.1–1)
BLD GP AB SCN CELLS X3 SERPL QL: NORMAL
BUN SERPL-MCNC: 11 MG/DL (ref 8–23)
CALCIUM SERPL-MCNC: 8.5 MG/DL (ref 8.7–10.5)
CHLORIDE SERPL-SCNC: 106 MMOL/L (ref 95–110)
CO2 SERPL-SCNC: 25 MMOL/L (ref 23–29)
CREAT SERPL-MCNC: 0.9 MG/DL (ref 0.5–1.4)
DIFFERENTIAL METHOD: ABNORMAL
EOSINOPHIL # BLD AUTO: 0 K/UL (ref 0–0.5)
EOSINOPHIL NFR BLD: 0 % (ref 0–8)
ERYTHROCYTE [DISTWIDTH] IN BLOOD BY AUTOMATED COUNT: 19.7 % (ref 11.5–14.5)
EST. GFR  (AFRICAN AMERICAN): >60 ML/MIN/1.73 M^2
EST. GFR  (NON AFRICAN AMERICAN): >60 ML/MIN/1.73 M^2
GLUCOSE SERPL-MCNC: 86 MG/DL (ref 70–110)
HCT VFR BLD AUTO: 28.4 % (ref 37–48.5)
HGB BLD-MCNC: 8.6 G/DL (ref 12–16)
HYPOCHROMIA BLD QL SMEAR: ABNORMAL
IMM GRANULOCYTES # BLD AUTO: 0 K/UL (ref 0–0.04)
IMM GRANULOCYTES NFR BLD AUTO: 0 % (ref 0–0.5)
LYMPHOCYTES # BLD AUTO: 0 K/UL (ref 1–4.8)
LYMPHOCYTES NFR BLD: 12.5 % (ref 18–48)
MAGNESIUM SERPL-MCNC: 1.8 MG/DL (ref 1.6–2.6)
MCH RBC QN AUTO: 24.9 PG (ref 27–31)
MCHC RBC AUTO-ENTMCNC: 30.3 G/DL (ref 32–36)
MCV RBC AUTO: 82 FL (ref 82–98)
MONOCYTES # BLD AUTO: 0 K/UL (ref 0.3–1)
MONOCYTES NFR BLD: 25 % (ref 4–15)
NEUTROPHILS # BLD AUTO: 0.1 K/UL (ref 1.8–7.7)
NEUTROPHILS NFR BLD: 62.5 % (ref 38–73)
NRBC BLD-RTO: 0 /100 WBC
OVALOCYTES BLD QL SMEAR: ABNORMAL
PHOSPHATE SERPL-MCNC: 3.1 MG/DL (ref 2.7–4.5)
PLATELET # BLD AUTO: 59 K/UL (ref 150–350)
PLATELET BLD QL SMEAR: ABNORMAL
PMV BLD AUTO: ABNORMAL FL (ref 9.2–12.9)
POIKILOCYTOSIS BLD QL SMEAR: SLIGHT
POTASSIUM SERPL-SCNC: 3.7 MMOL/L (ref 3.5–5.1)
PROT SERPL-MCNC: 6.3 G/DL (ref 6–8.4)
RBC # BLD AUTO: 3.45 M/UL (ref 4–5.4)
SODIUM SERPL-SCNC: 138 MMOL/L (ref 136–145)
WBC # BLD AUTO: 0.08 K/UL (ref 3.9–12.7)

## 2020-02-20 PROCEDURE — 99233 SBSQ HOSP IP/OBS HIGH 50: CPT | Mod: HCNC,,, | Performed by: INTERNAL MEDICINE

## 2020-02-20 PROCEDURE — 63600175 PHARM REV CODE 636 W HCPCS: Mod: HCNC | Performed by: NURSE PRACTITIONER

## 2020-02-20 PROCEDURE — 80053 COMPREHEN METABOLIC PANEL: CPT | Mod: HCNC

## 2020-02-20 PROCEDURE — 86850 RBC ANTIBODY SCREEN: CPT | Mod: HCNC

## 2020-02-20 PROCEDURE — 25000003 PHARM REV CODE 250: Mod: HCNC | Performed by: NURSE PRACTITIONER

## 2020-02-20 PROCEDURE — 25000003 PHARM REV CODE 250: Mod: HCNC | Performed by: INTERNAL MEDICINE

## 2020-02-20 PROCEDURE — 86920 COMPATIBILITY TEST SPIN: CPT | Mod: HCNC

## 2020-02-20 PROCEDURE — 99233 PR SUBSEQUENT HOSPITAL CARE,LEVL III: ICD-10-PCS | Mod: HCNC,,, | Performed by: INTERNAL MEDICINE

## 2020-02-20 PROCEDURE — 83735 ASSAY OF MAGNESIUM: CPT | Mod: HCNC

## 2020-02-20 PROCEDURE — 63600175 PHARM REV CODE 636 W HCPCS: Mod: JG,HCNC | Performed by: INTERNAL MEDICINE

## 2020-02-20 PROCEDURE — 63600175 PHARM REV CODE 636 W HCPCS: Mod: HCNC | Performed by: INTERNAL MEDICINE

## 2020-02-20 PROCEDURE — 85025 COMPLETE CBC W/AUTO DIFF WBC: CPT | Mod: HCNC

## 2020-02-20 PROCEDURE — 84100 ASSAY OF PHOSPHORUS: CPT | Mod: HCNC

## 2020-02-20 PROCEDURE — 20600001 HC STEP DOWN PRIVATE ROOM: Mod: HCNC

## 2020-02-20 PROCEDURE — A9155 ARTIFICIAL SALIVA: HCPCS | Mod: HCNC | Performed by: INTERNAL MEDICINE

## 2020-02-20 RX ADMIN — ONDANSETRON 8 MG: 2 INJECTION INTRAMUSCULAR; INTRAVENOUS at 09:02

## 2020-02-20 RX ADMIN — PANTOPRAZOLE SODIUM 40 MG: 40 TABLET, DELAYED RELEASE ORAL at 09:02

## 2020-02-20 RX ADMIN — Medication 30 ML: at 12:02

## 2020-02-20 RX ADMIN — Medication 30 ML: at 04:02

## 2020-02-20 RX ADMIN — SERTRALINE HYDROCHLORIDE 50 MG: 50 TABLET ORAL at 09:02

## 2020-02-20 RX ADMIN — LEVOFLOXACIN 500 MG: 500 TABLET, FILM COATED ORAL at 09:02

## 2020-02-20 RX ADMIN — GABAPENTIN 300 MG: 300 CAPSULE ORAL at 09:02

## 2020-02-20 RX ADMIN — Medication 400 MG: at 06:02

## 2020-02-20 RX ADMIN — FILGRASTIM 300 MCG: 480 INJECTION, SOLUTION INTRAVENOUS; SUBCUTANEOUS at 09:02

## 2020-02-20 RX ADMIN — ACYCLOVIR 800 MG: 200 CAPSULE ORAL at 09:02

## 2020-02-20 RX ADMIN — POTASSIUM CHLORIDE 20 MEQ: 1500 TABLET, EXTENDED RELEASE ORAL at 06:02

## 2020-02-20 RX ADMIN — HEPARIN SODIUM 1600 UNITS: 1000 INJECTION, SOLUTION INTRAVENOUS; SUBCUTANEOUS at 09:02

## 2020-02-20 RX ADMIN — HEPARIN SODIUM 1600 UNITS: 1000 INJECTION, SOLUTION INTRAVENOUS; SUBCUTANEOUS at 04:02

## 2020-02-20 RX ADMIN — OYSTER SHELL CALCIUM WITH VITAMIN D 1 TABLET: 500; 200 TABLET, FILM COATED ORAL at 09:02

## 2020-02-20 RX ADMIN — ONDANSETRON 8 MG: 2 INJECTION INTRAMUSCULAR; INTRAVENOUS at 04:02

## 2020-02-20 RX ADMIN — GABAPENTIN 600 MG: 300 CAPSULE ORAL at 09:02

## 2020-02-20 RX ADMIN — Medication 30 ML: at 09:02

## 2020-02-20 RX ADMIN — FLUCONAZOLE 400 MG: 200 TABLET ORAL at 09:02

## 2020-02-20 RX ADMIN — Medication 400 MG: at 09:02

## 2020-02-20 RX ADMIN — AMLODIPINE BESYLATE 5 MG: 5 TABLET ORAL at 09:02

## 2020-02-20 RX ADMIN — ENOXAPARIN SODIUM 40 MG: 100 INJECTION SUBCUTANEOUS at 04:02

## 2020-02-20 NOTE — PROGRESS NOTES
Pt seen for follow up. Hopeful about home being cleaned over the weekend professionally before her return home.  Still awaiting contact from [employer].  No other needs identified at this time. Will continue to follow and assist as needed.

## 2020-02-20 NOTE — PROGRESS NOTES
Notified NP Juli pt with c/o looseand watery stools. NP stated if stool progresses to watery notify team.

## 2020-02-20 NOTE — PLAN OF CARE
Side rails up x2; call bell in place; bed in lowest, locked position; skid proof socks on; no evidence of skin breakdown; care plan explained to patient; pt remains free of injury.  Pt on day +8 for an auto stem cell transplant. Pt tolerated po, voids, loose BM x 2, ambulates with a walker. Denies pain,or n/v. Pt instructed to call as needed. Neutropenic precautions maintained.  VSS and afebrile.

## 2020-02-20 NOTE — SUBJECTIVE & OBJECTIVE
Subjective:     Interval History: Day +8 [facility] auto, ANC 0 starting Neupogen today. Afebrile, VSS. Nausea and diarrhea controlled. Poor appetite due to taste changes    Objective:     Vital Signs (Most Recent):  Temp: 98.1 °F (36.7 °C) (02/20/20 0738)  Pulse: 60 (02/20/20 0738)  Resp: 16 (02/20/20 0738)  BP: (Abnormal) 117/59 (02/20/20 0738)  SpO2: 96 % (02/20/20 0738) Vital Signs (24h Range):  Temp:  [97.5 °F (36.4 °C)-98.3 °F (36.8 °C)] 98.1 °F (36.7 °C)  Pulse:  [60-80] 60  Resp:  [16] 16  SpO2:  [95 %-98 %] 96 %  BP: ()/(56-69) 117/59     Weight: 70.2 kg (154 lb 12.2 oz)  Body mass index is 27.42 kg/m².  Body surface area is 1.77 meters squared.      Intake/Output - Last 3 Shifts       02/18 0700 - 02/19 0659 02/19 0700 - 02/20 0659 02/20 0700 - 02/21 0659    P.O.  1620 240    Total Intake(mL/kg)  1620 (23.1) 240 (3.4)    Urine (mL/kg/hr) 700 (0.4) 1425 (0.8)     Stool  0     Total Output 700 1425     Net -700 +195 +240           Stool Occurrence  2 x           Physical Exam   Constitutional: She is oriented to person, place, and time. Vital signs are normal. She is cooperative.   HENT:   Head: Normocephalic.   Eyes: Lids are normal.   Neck: Trachea normal and normal range of motion.   Cardiovascular: Normal rate, regular rhythm, S1 normal, S2 normal and normal heart sounds.   Pulmonary/Chest: Effort normal and breath sounds normal.   Abdominal: Soft. Normal appearance and bowel sounds are normal.   Musculoskeletal: Normal range of motion.   Neurological: She is alert and oriented to person, place, and time. Coordination and gait normal.   Skin: Skin is dry and intact. She is not diaphoretic. No pallor.   vascath to chest wall c/d/i   Psychiatric: She has a normal mood and affect. Her speech is normal and behavior is normal. Judgment and thought content normal. Cognition and memory are normal.       Significant Labs:   CBC:   Recent Labs   Lab 02/19/20  0403 02/20/20  0430   WBC 0.08* 0.08*   HGB 8.3* 8.6*    HCT 27.7* 28.4*   PLT 87* 59*    and CMP:   Recent Labs   Lab 02/19/20  0403 02/20/20  0430    138   K 3.6 3.7    106   CO2 24 25   GLU 86 86   BUN 12 11   CREATININE 0.8 0.9   CALCIUM 8.1* 8.5*   PROT 5.8* 6.3   ALBUMIN 3.3* 3.5   BILITOT 0.3 0.4   ALKPHOS 56 55   AST 16 15   ALT 11 12   ANIONGAP 10 7*   EGFRNONAA >60.0 >60.0       Diagnostic Results:  None

## 2020-02-20 NOTE — ASSESSMENT & PLAN NOTE
-hx of JESSE; received single dose feraheme pre transplant  -colonoscopy okay for transplant  -will monitor daily CBC inpatient  -transfuse for Hgb <7, Plt <10K or bleeding  -wbc 0.08, hgb 8.6, plt 59K

## 2020-02-20 NOTE — PLAN OF CARE
Plan of care reviewed with the patient at the beginning of the shift. Pt is day +8 of auto transplant. Pt is tolerating transplant well. Pt states that nausea is improved with scheduled zofran. One bowel movement this morning, loose but not diarrhea. No mucositis noted. PO intake encouraged. Fall precautions maintained. Pt up with a walker independently. Tremors noted at baseline. Pt remained free from falls and injury this shift. Bed locked in lowest position, side rails up x2, call light within reach. Instructed pt to call for assistance as needed. Pt verbalized understanding. Vitals stable. Pt afebrile overnight. Neutropenic precautions maintained. No acute issues overnight. Will continue to monitor.

## 2020-02-20 NOTE — PROGRESS NOTES
[facility]  Hematology  Bone Marrow Transplant  Progress Note    Patient Name: Shelby Thompson  Admission Date: 2/10/2020  Hospital Length of Stay: 10 days  Code Status: Full Code    Subjective:     Interval History: Day +8 [facility] auto, ANC 0 starting Neupogen today. Afebrile, VSS. Nausea and diarrhea controlled. Poor appetite due to taste changes    Objective:     Vital Signs (Most Recent):  Temp: 98.1 °F (36.7 °C) (02/20/20 0738)  Pulse: 60 (02/20/20 0738)  Resp: 16 (02/20/20 0738)  BP: (Abnormal) 117/59 (02/20/20 0738)  SpO2: 96 % (02/20/20 0738) Vital Signs (24h Range):  Temp:  [97.5 °F (36.4 °C)-98.3 °F (36.8 °C)] 98.1 °F (36.7 °C)  Pulse:  [60-80] 60  Resp:  [16] 16  SpO2:  [95 %-98 %] 96 %  BP: ()/(56-69) 117/59     Weight: 70.2 kg (154 lb 12.2 oz)  Body mass index is 27.42 kg/m².  Body surface area is 1.77 meters squared.      Intake/Output - Last 3 Shifts       02/18 0700 - 02/19 0659 02/19 0700 - 02/20 0659 02/20 0700 - 02/21 0659    P.O.  1620 240    Total Intake(mL/kg)  1620 (23.1) 240 (3.4)    Urine (mL/kg/hr) 700 (0.4) 1425 (0.8)     Stool  0     Total Output 700 1425     Net -700 +195 +240           Stool Occurrence  2 x           Physical Exam   Constitutional: She is oriented to person, place, and time. Vital signs are normal. She is cooperative.   HENT:   Head: Normocephalic.   Eyes: Lids are normal.   Neck: Trachea normal and normal range of motion.   Cardiovascular: Normal rate, regular rhythm, S1 normal, S2 normal and normal heart sounds.   Pulmonary/Chest: Effort normal and breath sounds normal.   Abdominal: Soft. Normal appearance and bowel sounds are normal.   Musculoskeletal: Normal range of motion.   Neurological: She is alert and oriented to person, place, and time. Coordination and gait normal.   Skin: Skin is dry and intact. She is not diaphoretic. No pallor.   vascath to chest wall c/d/i   Psychiatric: She has a normal mood and affect. Her speech is normal and  behavior is normal. Judgment and thought content normal. Cognition and memory are normal.       Significant Labs:   CBC:   Recent Labs   Lab 02/19/20  0403 02/20/20  0430   WBC 0.08* 0.08*   HGB 8.3* 8.6*   HCT 27.7* 28.4*   PLT 87* 59*    and CMP:   Recent Labs   Lab 02/19/20  0403 02/20/20  0430    138   K 3.6 3.7    106   CO2 24 25   GLU 86 86   BUN 12 11   CREATININE 0.8 0.9   CALCIUM 8.1* 8.5*   PROT 5.8* 6.3   ALBUMIN 3.3* 3.5   BILITOT 0.3 0.4   ALKPHOS 56 55   AST 16 15   ALT 11 12   ANIONGAP 10 7*   EGFRNONAA >60.0 >60.0       Diagnostic Results:  None    Assessment/Plan:     * Status post autologous bone marrow transplant  -Today is day +8  -received Melphalan 140mg/m2 without difficulty  -stem cell infusion was on 2/12 without issue, received 3 bags (2.85 x 10^6) CD 34 stem cells  -some expected fatigue, poor appetite, not watery diarrhea, and nausea which is improved with scheduled zofran and PRN Compazine    Regimen as below:  Transplant Day -2: IV fluids  Transplant Day -1 through Day 0: Antiemetics   Transplant Day -1: Melphalan   Transplant Day 0: Stem Cell Infusion   Transplant Day +7: Growth factor begins    Multiple myeloma in remission  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) High Risk disease; complex karyotype by CG; , t(14;20), monosomy 13 on FISH  -She has achieved and tolerated well VRd x completing 7, 28 day cycles and achieving stringent CR as of Dec 2019 marrow/biochemical studies   -adequate response to proceed with autoSCT  -she has PS 1-2 and multiple abnormalities in her pre transplant eval that have been cleared by pulmonology, cardiology, gynecology and are not prohibitive to proceed with transplant  -she has JESSE - sp  one dose of iv feraheme with response  will need reassessment by GI; if colonosocpy normal  proceed with transplant   -no further velcade maintenance pre transplant, last dose was 12/24/19  -plan for  reduced dose clifton 140mg/m2 in light of age    Chemotherapy-induced nausea  - continue scheduled Zofran  - PRN compazine and Ativan    Type 2 diabetes mellitus without complication, without long-term current use of insulin  -monitor closely while inpatient; not on active medication     Essential hypertension  -continue norvasc       Drug-induced polyneuropathy  -pt has had numbness/tingling to hands and BUE tremors post CVA, for which she is on gabapentin 600 mg BID  - prn tramadol     Pancytopenia due to antineoplastic chemotherapy  -hx of JESSE; received single dose feraheme pre transplant  -colonoscopy okay for transplant  -will monitor daily CBC inpatient  -transfuse for Hgb <7, Plt <10K or bleeding  -wbc 0.08, hgb 8.6, plt 59K    Interstitial lung disease  - had abnormal PFTs during transplant evaluation 10/2019; seen by pulm; no significant desaturation on 6MWT; okay per transplant per pulm     Recurrent major depressive disorder, in full remission  -continue zoloft     Liver mass  -vascular per recent MRI in 09/2019 with no changes; seen by hepatology pre-transplant and signed off; recommend repeat MRI in 6 months     History of CVA with residual deficit  -one in 2008, one in 2011  -has been cleared by cardiology for transplant     Hyperlipemia  -on praluent, PCSK9 inhibitor; will hold on admit     Personal history of malignant neoplasm of breast  L breast cancer DCIS stage 0  -s/p lumpectomy and radiation, no endocrine tx due to CVA         VTE Risk Mitigation (From admission, onward)         Ordered     heparin (porcine) injection 1,600 Units  As needed (PRN)      02/10/20 2141     enoxaparin injection 40 mg  Daily      02/10/20 1738     IP VTE HIGH RISK PATIENT  Once      02/10/20 1738                Disposition: pending count recovery post transplant    Kathleen Kimball, NP  Bone Marrow Transplant  [facility]

## 2020-02-20 NOTE — ASSESSMENT & PLAN NOTE
-Today is day +8  -received Melphalan 140mg/m2 without difficulty  -stem cell infusion was on 2/12 without issue, received 3 bags (2.85 x 10^6) CD 34 stem cells  -some expected fatigue, poor appetite, not watery diarrhea, and nausea which is improved with scheduled zofran and PRN Compazine    Regimen as below:  Transplant Day -2: IV fluids  Transplant Day -1 through Day 0: Antiemetics   Transplant Day -1: Melphalan   Transplant Day 0: Stem Cell Infusion   Transplant Day +7: Growth factor begins

## 2020-02-21 PROBLEM — R19.7 DIARRHEA: Status: ACTIVE | Noted: 2020-02-21

## 2020-02-21 LAB
ALBUMIN SERPL BCP-MCNC: 3.5 G/DL (ref 3.5–5.2)
ALP SERPL-CCNC: 55 U/L (ref 55–135)
ALT SERPL W/O P-5'-P-CCNC: 12 U/L (ref 10–44)
ANION GAP SERPL CALC-SCNC: 8 MMOL/L (ref 8–16)
ANISOCYTOSIS BLD QL SMEAR: SLIGHT
AST SERPL-CCNC: 14 U/L (ref 10–40)
BASOPHILS # BLD AUTO: 0 K/UL (ref 0–0.2)
BASOPHILS NFR BLD: 0 % (ref 0–1.9)
BILIRUB SERPL-MCNC: 0.4 MG/DL (ref 0.1–1)
BUN SERPL-MCNC: 11 MG/DL (ref 8–23)
C DIFF GDH STL QL: NEGATIVE
C DIFF TOX A+B STL QL IA: NEGATIVE
CALCIUM SERPL-MCNC: 8.6 MG/DL (ref 8.7–10.5)
CHLORIDE SERPL-SCNC: 108 MMOL/L (ref 95–110)
CO2 SERPL-SCNC: 25 MMOL/L (ref 23–29)
CREAT SERPL-MCNC: 0.9 MG/DL (ref 0.5–1.4)
DIFFERENTIAL METHOD: ABNORMAL
EOSINOPHIL # BLD AUTO: 0 K/UL (ref 0–0.5)
EOSINOPHIL NFR BLD: 0 % (ref 0–8)
ERYTHROCYTE [DISTWIDTH] IN BLOOD BY AUTOMATED COUNT: 19.4 % (ref 11.5–14.5)
EST. GFR  (AFRICAN AMERICAN): >60 ML/MIN/1.73 M^2
EST. GFR  (NON AFRICAN AMERICAN): >60 ML/MIN/1.73 M^2
GLUCOSE SERPL-MCNC: 87 MG/DL (ref 70–110)
HCT VFR BLD AUTO: 28.1 % (ref 37–48.5)
HGB BLD-MCNC: 8.7 G/DL (ref 12–16)
IMM GRANULOCYTES # BLD AUTO: 0 K/UL (ref 0–0.04)
IMM GRANULOCYTES NFR BLD AUTO: 0 % (ref 0–0.5)
LYMPHOCYTES # BLD AUTO: 0 K/UL (ref 1–4.8)
LYMPHOCYTES NFR BLD: 50 % (ref 18–48)
MAGNESIUM SERPL-MCNC: 1.7 MG/DL (ref 1.6–2.6)
MCH RBC QN AUTO: 25.3 PG (ref 27–31)
MCHC RBC AUTO-ENTMCNC: 31 G/DL (ref 32–36)
MCV RBC AUTO: 82 FL (ref 82–98)
MONOCYTES # BLD AUTO: 0 K/UL (ref 0.3–1)
MONOCYTES NFR BLD: 25 % (ref 4–15)
NEUTROPHILS # BLD AUTO: 0 K/UL (ref 1.8–7.7)
NEUTROPHILS NFR BLD: 25 % (ref 38–73)
NRBC BLD-RTO: 0 /100 WBC
PHOSPHATE SERPL-MCNC: 3.1 MG/DL (ref 2.7–4.5)
PLATELET # BLD AUTO: 29 K/UL (ref 150–350)
PLATELET BLD QL SMEAR: ABNORMAL
PMV BLD AUTO: ABNORMAL FL (ref 9.2–12.9)
POLYCHROMASIA BLD QL SMEAR: ABNORMAL
POTASSIUM SERPL-SCNC: 3.6 MMOL/L (ref 3.5–5.1)
PROT SERPL-MCNC: 6.5 G/DL (ref 6–8.4)
RBC # BLD AUTO: 3.44 M/UL (ref 4–5.4)
SODIUM SERPL-SCNC: 141 MMOL/L (ref 136–145)
WBC # BLD AUTO: 0.04 K/UL (ref 3.9–12.7)

## 2020-02-21 PROCEDURE — 97116 GAIT TRAINING THERAPY: CPT | Mod: HCNC,CQ

## 2020-02-21 PROCEDURE — 97530 THERAPEUTIC ACTIVITIES: CPT | Mod: HCNC,CQ

## 2020-02-21 PROCEDURE — A9155 ARTIFICIAL SALIVA: HCPCS | Mod: HCNC | Performed by: INTERNAL MEDICINE

## 2020-02-21 PROCEDURE — 85025 COMPLETE CBC W/AUTO DIFF WBC: CPT | Mod: HCNC

## 2020-02-21 PROCEDURE — 87324 CLOSTRIDIUM AG IA: CPT | Mod: HCNC

## 2020-02-21 PROCEDURE — 63600175 PHARM REV CODE 636 W HCPCS: Mod: HCNC | Performed by: INTERNAL MEDICINE

## 2020-02-21 PROCEDURE — 99233 SBSQ HOSP IP/OBS HIGH 50: CPT | Mod: HCNC,,, | Performed by: INTERNAL MEDICINE

## 2020-02-21 PROCEDURE — 83735 ASSAY OF MAGNESIUM: CPT | Mod: HCNC

## 2020-02-21 PROCEDURE — 80053 COMPREHEN METABOLIC PANEL: CPT | Mod: HCNC

## 2020-02-21 PROCEDURE — 99233 PR SUBSEQUENT HOSPITAL CARE,LEVL III: ICD-10-PCS | Mod: HCNC,,, | Performed by: INTERNAL MEDICINE

## 2020-02-21 PROCEDURE — 20600001 HC STEP DOWN PRIVATE ROOM: Mod: HCNC

## 2020-02-21 PROCEDURE — 25000003 PHARM REV CODE 250: Mod: HCNC | Performed by: INTERNAL MEDICINE

## 2020-02-21 PROCEDURE — 63600175 PHARM REV CODE 636 W HCPCS: Mod: JG,HCNC | Performed by: INTERNAL MEDICINE

## 2020-02-21 PROCEDURE — 87449 NOS EACH ORGANISM AG IA: CPT | Mod: HCNC

## 2020-02-21 PROCEDURE — 84100 ASSAY OF PHOSPHORUS: CPT | Mod: HCNC

## 2020-02-21 PROCEDURE — 63600175 PHARM REV CODE 636 W HCPCS: Mod: HCNC | Performed by: NURSE PRACTITIONER

## 2020-02-21 PROCEDURE — 25000003 PHARM REV CODE 250: Mod: HCNC | Performed by: NURSE PRACTITIONER

## 2020-02-21 RX ORDER — LOPERAMIDE HYDROCHLORIDE 2 MG/1
2 CAPSULE ORAL 4 TIMES DAILY PRN
Status: DISCONTINUED | OUTPATIENT
Start: 2020-02-21 | End: 2020-02-21

## 2020-02-21 RX ORDER — SODIUM CHLORIDE 9 MG/ML
INJECTION, SOLUTION INTRAVENOUS CONTINUOUS
Status: DISCONTINUED | OUTPATIENT
Start: 2020-02-21 | End: 2020-02-26 | Stop reason: HOSPADM

## 2020-02-21 RX ADMIN — OYSTER SHELL CALCIUM WITH VITAMIN D 1 TABLET: 500; 200 TABLET, FILM COATED ORAL at 09:02

## 2020-02-21 RX ADMIN — GABAPENTIN 600 MG: 300 CAPSULE ORAL at 08:02

## 2020-02-21 RX ADMIN — ONDANSETRON 8 MG: 2 INJECTION INTRAMUSCULAR; INTRAVENOUS at 03:02

## 2020-02-21 RX ADMIN — PANTOPRAZOLE SODIUM 40 MG: 40 TABLET, DELAYED RELEASE ORAL at 09:02

## 2020-02-21 RX ADMIN — Medication 400 MG: at 06:02

## 2020-02-21 RX ADMIN — HEPARIN SODIUM 1600 UNITS: 1000 INJECTION, SOLUTION INTRAVENOUS; SUBCUTANEOUS at 09:02

## 2020-02-21 RX ADMIN — LEVOFLOXACIN 500 MG: 500 TABLET, FILM COATED ORAL at 09:02

## 2020-02-21 RX ADMIN — OYSTER SHELL CALCIUM WITH VITAMIN D 1 TABLET: 500; 200 TABLET, FILM COATED ORAL at 08:02

## 2020-02-21 RX ADMIN — ACYCLOVIR 800 MG: 200 CAPSULE ORAL at 09:02

## 2020-02-21 RX ADMIN — POTASSIUM CHLORIDE 20 MEQ: 1500 TABLET, EXTENDED RELEASE ORAL at 06:02

## 2020-02-21 RX ADMIN — ONDANSETRON 8 MG: 2 INJECTION INTRAMUSCULAR; INTRAVENOUS at 09:02

## 2020-02-21 RX ADMIN — GABAPENTIN 300 MG: 300 CAPSULE ORAL at 09:02

## 2020-02-21 RX ADMIN — SODIUM CHLORIDE: 0.9 INJECTION, SOLUTION INTRAVENOUS at 12:02

## 2020-02-21 RX ADMIN — Medication 30 ML: at 12:02

## 2020-02-21 RX ADMIN — FLUCONAZOLE 400 MG: 200 TABLET ORAL at 09:02

## 2020-02-21 RX ADMIN — Medication 30 ML: at 05:02

## 2020-02-21 RX ADMIN — ONDANSETRON 8 MG: 2 INJECTION INTRAMUSCULAR; INTRAVENOUS at 08:02

## 2020-02-21 RX ADMIN — Medication 30 ML: at 08:02

## 2020-02-21 RX ADMIN — Medication 400 MG: at 12:02

## 2020-02-21 RX ADMIN — FILGRASTIM 300 MCG: 480 INJECTION, SOLUTION INTRAVENOUS; SUBCUTANEOUS at 09:02

## 2020-02-21 RX ADMIN — SERTRALINE HYDROCHLORIDE 50 MG: 50 TABLET ORAL at 09:02

## 2020-02-21 RX ADMIN — Medication 30 ML: at 09:02

## 2020-02-21 RX ADMIN — ACYCLOVIR 800 MG: 200 CAPSULE ORAL at 08:02

## 2020-02-21 NOTE — PROGRESS NOTES
[facility]  Hematology  Bone Marrow Transplant  Progress Note    Patient Name: Shelby Thompson  Admission Date: 2/10/2020  Hospital Length of Stay: 11 days  Code Status: Full Code    Subjective:     Interval History: Day + 9 from [facility] Auto SCT for MM. ANC 0 today. Continues to be afebrile. VSS. Loose stools. Checking for c-diff. Will start imodium if negative. Nausea responding well to zofran. Eating 25-50% of meals.    Objective:     Vital Signs (Most Recent):  Temp: 97.8 °F (36.6 °C) (02/21/20 1225)  Pulse: 89 (02/21/20 1225)  Resp: 16 (02/21/20 1225)  BP: 106/62 (02/21/20 1225)  SpO2: 98 % (02/21/20 1225) Vital Signs (24h Range):  Temp:  [97.7 °F (36.5 °C)-98.3 °F (36.8 °C)] 97.8 °F (36.6 °C)  Pulse:  [62-89] 89  Resp:  [16] 16  SpO2:  [95 %-100 %] 98 %  BP: ()/(55-65) 106/62     Weight: 69.8 kg (153 lb 14.1 oz)  Body mass index is 27.26 kg/m².  Body surface area is 1.76 meters squared.      Intake/Output - Last 3 Shifts       02/19 0700 - 02/20 0659 02/20 0700 - 02/21 0659 02/21 0700 - 02/22 0659    P.O. 1620 1140 480    Total Intake(mL/kg) 1620 (23.1) 1140 (16.3) 480 (6.9)    Urine (mL/kg/hr) 1425 (0.8) 600 (0.4) 100 (0.2)    Stool 0 0     Total Output 1425 600 100    Net +195 +540 +380           Urine Occurrence  1 x     Stool Occurrence 2 x 3 x           Physical Exam   Constitutional: She is oriented to person, place, and time. Vital signs are normal. She appears well-developed and well-nourished. She is cooperative.   HENT:   Head: Normocephalic.   Eyes: Pupils are equal, round, and reactive to light. Conjunctivae, EOM and lids are normal.   Neck: Trachea normal and normal range of motion.   Cardiovascular: Normal rate, regular rhythm, S1 normal, S2 normal and normal heart sounds.   Pulmonary/Chest: Effort normal and breath sounds normal.   Abdominal: Soft. Normal appearance and bowel sounds are normal.   Musculoskeletal: Normal range of motion.   Chronic tremor to RUE 2/2 stroke    Neurological: She is alert and oriented to person, place, and time. Coordination and gait normal.   Skin: Skin is dry and intact. She is not diaphoretic. No pallor.   vascath to chest wall c/d/i   Psychiatric: She has a normal mood and affect. Her speech is normal and behavior is normal. Judgment and thought content normal. Cognition and memory are normal.       Significant Labs:   CBC:   Recent Labs   Lab 02/20/20 0430 02/21/20 0434   WBC 0.08* 0.04*   HGB 8.6* 8.7*   HCT 28.4* 28.1*   PLT 59* 29*    and CMP:   Recent Labs   Lab 02/20/20 0430 02/21/20 0434    141   K 3.7 3.6    108   CO2 25 25   GLU 86 87   BUN 11 11   CREATININE 0.9 0.9   CALCIUM 8.5* 8.6*   PROT 6.3 6.5   ALBUMIN 3.5 3.5   BILITOT 0.4 0.4   ALKPHOS 55 55   AST 15 14   ALT 12 12   ANIONGAP 7* 8   EGFRNONAA >60.0 >60.0       Diagnostic Results:  None    Assessment/Plan:     * Status post autologous bone marrow transplant  -Today is day +9  -received Melphalan 140mg/m2 without difficulty  -stem cell infusion was on 2/12 without issue, received 3 bags (2.85 x 10^6) CD 34 stem cells  -some expected fatigue, poor appetite, not watery diarrhea, and nausea which is improved with scheduled zofran and PRN Compazine    Regimen as below:  Transplant Day -2: IV fluids  Transplant Day -1 through Day 0: Antiemetics   Transplant Day -1: Melphalan   Transplant Day 0: Stem Cell Infusion   Transplant Day +7: Growth factor begins    Multiple myeloma in remission  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) High Risk disease; complex karyotype by CG; , t(14;20), monosomy 13 on FISH  -She has achieved and tolerated well VRd x completing 7, 28 day cycles and achieving stringent CR as of Dec 2019 marrow/biochemical studies   -adequate response to proceed with autoSCT  -she has PS 1-2 and multiple abnormalities in her pre transplant eval that have been cleared by pulmonology, cardiology, gynecology and  are not prohibitive to proceed with transplant  -she has JESSE - sp  one dose of iv feraheme with response  will need reassessment by GI; if colonosocpy normal  proceed with transplant   -no further velcade maintenance pre transplant, last dose was 12/24/19  -plan for reduced dose clifton 140mg/m2 in light of age    Pancytopenia due to antineoplastic chemotherapy  -hx of JESSE; received single dose feraheme pre transplant  -colonoscopy okay for transplant  -will monitor daily CBC inpatient  -transfuse for Hgb <7, Plt <10K or bleeding  -wbc 0.04, hgb 8.7, plt 29K    Diarrhea  Will check stool for c-diff and start imodium if neg    Chemotherapy-induced nausea  - continue scheduled Zofran  - PRN compazine and Ativan    Type 2 diabetes mellitus without complication, without long-term current use of insulin  -monitor closely while inpatient; not on active medication     Essential hypertension  -continue norvasc       Drug-induced polyneuropathy  -pt has had numbness/tingling to hands and BUE tremors post CVA, for which she is on gabapentin 600 mg BID  - prn tramadol     Interstitial lung disease  - had abnormal PFTs during transplant evaluation 10/2019; seen by pulm; no significant desaturation on 6MWT; okay per transplant per pulm     Recurrent major depressive disorder, in full remission  -continue zoloft     Liver mass  -vascular per recent MRI in 09/2019 with no changes; seen by hepatology pre-transplant and signed off; recommend repeat MRI in 6 months     History of CVA with residual deficit  -one in 2008, one in 2011  -has been cleared by cardiology for transplant     Hyperlipemia  -on praluent, PCSK9 inhibitor; holding during admission     Personal history of malignant neoplasm of breast  L breast cancer DCIS stage 0  -s/p lumpectomy and radiation, no endocrine tx due to CVA         VTE Risk Mitigation (From admission, onward)         Ordered     heparin (porcine) injection 1,600 Units  As needed (PRN)      02/10/20 0746      IP VTE HIGH RISK PATIENT  Once      02/10/20 2684                Disposition: S/p [facility] Auto SCT. Discharge pending engraftment and count recovery.    Thuy Nelson, KISHOR  Bone Marrow Transplant  [facility]

## 2020-02-21 NOTE — ASSESSMENT & PLAN NOTE
-Today is day +9  -received Melphalan 140mg/m2 without difficulty  -stem cell infusion was on 2/12 without issue, received 3 bags (2.85 x 10^6) CD 34 stem cells  -some expected fatigue, poor appetite, not watery diarrhea, and nausea which is improved with scheduled zofran and PRN Compazine    Regimen as below:  Transplant Day -2: IV fluids  Transplant Day -1 through Day 0: Antiemetics   Transplant Day -1: Melphalan   Transplant Day 0: Stem Cell Infusion   Transplant Day +7: Growth factor begins

## 2020-02-21 NOTE — PLAN OF CARE
Side rails up x2; call bell in place; bed in lowest, locked position; skid proof socks on; no evidence of skin breakdown; care plan explained to patient; pt remains free of injury.  Pt on day +9 for an auto stem cell transplant. Pt tolerated po, voids, watery BM x 2, ambulates with a walker. Denies pain,or n/v. Pt instructed to call as needed. Stool sample sent for C-diff, contact isolation and Neutropenic precautions maintained.  VSS and afebrile.

## 2020-02-21 NOTE — ASSESSMENT & PLAN NOTE
-hx of JESSE; received single dose feraheme pre transplant  -colonoscopy okay for transplant  -will monitor daily CBC inpatient  -transfuse for Hgb <7, Plt <10K or bleeding  -wbc 0.04, hgb 8.7, plt 29K

## 2020-02-21 NOTE — PT/OT/SLP PROGRESS
Physical Therapy Treatment    Patient Name:  Shelby Thompson   MRN:  [id number]    Recommendations:     Discharge Recommendations:  home with home health   Discharge Equipment Recommendations: none   Barriers to discharge: Decreased caregiver support    Assessment:     Shelby Thompson is a 69 y.o. female admitted with a medical diagnosis of Status post autologous bone marrow transplant.  She presents with the following impairments/functional limitations:  impaired endurance, gait instability, impaired functional mobilty, impaired balance .Pt continues to remain motivated and cooperative with treatment session. Pt Progressing with PT Intervention.  Pt would continue to benefit from skilled PT to address overall functional mobility and goals. Goals remain appropriate      Rehab Prognosis: Good; patient would benefit from acute skilled PT services to address these deficits and reach maximum level of function.    Recent Surgery: * No surgery found *      Plan:     During this hospitalization, patient to be seen 2 x/week to address the identified rehab impairments via gait training, therapeutic activities, therapeutic exercises and progress toward the following goals:    · Plan of Care Expires:  03/11/20    Subjective     Patient/Family Comments/goals: I can go walking   Pain/Comfort:  · Pain Rating 1: 0/10  · Pain Rating Post-Intervention 1: 0/10      Objective:     Communicated with RN prior to session.  Patient found up in chair with   upon PT entry to room.     General Precautions: Standard, fall, special contact   Orthopedic Precautions:N/A   Braces: N/A     Functional Mobility:  · Transfers:     · Sit to Stand:  supervision with no AD  · Gait: pt amb with RW with SBA for safety 255 ft   with  with mask and gloves on. Pt with increased BUE tremors noted with gait         AM-PAC 6 CLICK MOBILITY  Turning over in bed (including adjusting bedclothes, sheets and blankets)?: 4  Sitting down on and standing up from a  chair with arms (e.g., wheelchair, bedside commode, etc.): 3  Moving from lying on back to sitting on the side of the bed?: 4  Moving to and from a bed to a chair (including a wheelchair)?: 3  Need to walk in hospital room?: 3  Climbing 3-5 steps with a railing?: 3  Basic Mobility Total Score: 20       Therapeutic Activities and Exercises:   educated patient on progress, safety,d/c,PT POC, on the effects of prolonged immobility and the importance of performing OOB activity and exercises to promote healing and reduce recovery time   Patient educated on  performing therex   Exercises performed to develop and maintain pt's strength, endurance and flexibility.   Updated white board with appropriate PT mobility information for medical team notification  Pt encouraged to ambulate in hallways 3x/day with nursing or family assistance to improve endurance.   Pt safe to ambulate in hallway with RN or PCT assistance   Bedside table in front of patient and area set up for function, convenience, and safety. RN aware of patient's mobility needs and status. Questions/concerns addressed within PTA scope of practice; patient with no further questions. Time was provided for active listening, discussion of health disposition, and discussion of safe discharge. Pt?verbalized?agreement         Patient left up in chair with all lines intact, call button in reach     GOALS:   Multidisciplinary Problems     Physical Therapy Goals        Problem: Physical Therapy Goal    Goal Priority Disciplines Outcome Goal Variances Interventions   Physical Therapy Goal     PT, PT/OT Ongoing, Progressing     Description:  Goals to be met by: 2020     Patient will increase functional independence with mobility by performin. Bed to chair transfer with Supervision using Rolling Walker  2. Gait  x 150 feet with Supervision using Rolling Walker.   3. Ascend/descend 3 stair with bilateral Handrails Stand-by Assistance using no AD.   4. Lower  extremity exercise program x20 reps per handout, with independence                      Time Tracking:     PT Received On: 02/21/20  PT Start Time: 1028     PT Stop Time: 1051  PT Total Time (min): 23 min     Billable Minutes: Gait Training 15 and Therapeutic Activity 8    Treatment Type: Treatment  PT/PTA: PTA     PTA Visit Number: 3     Lester Masters, PTA  02/21/2020

## 2020-02-21 NOTE — SUBJECTIVE & OBJECTIVE
Subjective:     Interval History: Day + 9 from [facility] Auto S[facility] for MM. ANC 0 today. Continues to be afebrile. VSS. Loose stools. Checking for c-diff. Will start imodium if negative. Nausea responding well to zofran. Eating 25-50% of meals.    Objective:     Vital Signs (Most Recent):  Temp: 97.8 °F (36.6 °C) (02/21/20 1225)  Pulse: 89 (02/21/20 1225)  Resp: 16 (02/21/20 1225)  BP: 106/62 (02/21/20 1225)  SpO2: 98 % (02/21/20 1225) Vital Signs (24h Range):  Temp:  [97.7 °F (36.5 °C)-98.3 °F (36.8 °C)] 97.8 °F (36.6 °C)  Pulse:  [62-89] 89  Resp:  [16] 16  SpO2:  [95 %-100 %] 98 %  BP: ()/(55-65) 106/62     Weight: 69.8 kg (153 lb 14.1 oz)  Body mass index is 27.26 kg/m².  Body surface area is 1.76 meters squared.      Intake/Output - Last 3 Shifts       02/19 0700 - 02/20 0659 02/20 0700 - 02/21 0659 02/21 0700 - 02/22 0659    P.O. 1620 1140 480    Total Intake(mL/kg) 1620 (23.1) 1140 (16.3) 480 (6.9)    Urine (mL/kg/hr) 1425 (0.8) 600 (0.4) 100 (0.2)    Stool 0 0     Total Output 1425 600 100    Net +195 +540 +380           Urine Occurrence  1 x     Stool Occurrence 2 x 3 x           Physical Exam   Constitutional: She is oriented to person, place, and time. Vital signs are normal. She appears well-developed and well-nourished. She is cooperative.   HENT:   Head: Normocephalic.   Eyes: Pupils are equal, round, and reactive to light. Conjunctivae, EOM and lids are normal.   Neck: Trachea normal and normal range of motion.   Cardiovascular: Normal rate, regular rhythm, S1 normal, S2 normal and normal heart sounds.   Pulmonary/Chest: Effort normal and breath sounds normal.   Abdominal: Soft. Normal appearance and bowel sounds are normal.   Musculoskeletal: Normal range of motion.   Chronic tremor to RUE 2/2 stroke   Neurological: She is alert and oriented to person, place, and time. Coordination and gait normal.   Skin: Skin is dry and intact. She is not diaphoretic. No pallor.   vascath to chest wall c/d/i    Psychiatric: She has a normal mood and affect. Her speech is normal and behavior is normal. Judgment and thought content normal. Cognition and memory are normal.       Significant Labs:   CBC:   Recent Labs   Lab 02/20/20 0430 02/21/20 0434   WBC 0.08* 0.04*   HGB 8.6* 8.7*   HCT 28.4* 28.1*   PLT 59* 29*    and CMP:   Recent Labs   Lab 02/20/20 0430 02/21/20 0434    141   K 3.7 3.6    108   CO2 25 25   GLU 86 87   BUN 11 11   CREATININE 0.9 0.9   CALCIUM 8.5* 8.6*   PROT 6.3 6.5   ALBUMIN 3.5 3.5   BILITOT 0.4 0.4   ALKPHOS 55 55   AST 15 14   ALT 12 12   ANIONGAP 7* 8   EGFRNONAA >60.0 >60.0       Diagnostic Results:  None

## 2020-02-22 LAB
ALBUMIN SERPL BCP-MCNC: 3 G/DL (ref 3.5–5.2)
ALP SERPL-CCNC: 47 U/L (ref 55–135)
ALT SERPL W/O P-5'-P-CCNC: 9 U/L (ref 10–44)
ANION GAP SERPL CALC-SCNC: 8 MMOL/L (ref 8–16)
ANISOCYTOSIS BLD QL SMEAR: SLIGHT
AST SERPL-CCNC: 10 U/L (ref 10–40)
BASOPHILS # BLD AUTO: 0 K/UL (ref 0–0.2)
BASOPHILS NFR BLD: 0 % (ref 0–1.9)
BILIRUB SERPL-MCNC: 0.4 MG/DL (ref 0.1–1)
BUN SERPL-MCNC: 10 MG/DL (ref 8–23)
CALCIUM SERPL-MCNC: 8 MG/DL (ref 8.7–10.5)
CHLORIDE SERPL-SCNC: 111 MMOL/L (ref 95–110)
CO2 SERPL-SCNC: 22 MMOL/L (ref 23–29)
CREAT SERPL-MCNC: 0.8 MG/DL (ref 0.5–1.4)
DACRYOCYTES BLD QL SMEAR: ABNORMAL
DIFFERENTIAL METHOD: ABNORMAL
EOSINOPHIL # BLD AUTO: 0 K/UL (ref 0–0.5)
EOSINOPHIL NFR BLD: 0 % (ref 0–8)
ERYTHROCYTE [DISTWIDTH] IN BLOOD BY AUTOMATED COUNT: 19.1 % (ref 11.5–14.5)
EST. GFR  (AFRICAN AMERICAN): >60 ML/MIN/1.73 M^2
EST. GFR  (NON AFRICAN AMERICAN): >60 ML/MIN/1.73 M^2
GLUCOSE SERPL-MCNC: 82 MG/DL (ref 70–110)
HCT VFR BLD AUTO: 23.2 % (ref 37–48.5)
HGB BLD-MCNC: 7 G/DL (ref 12–16)
HYPOCHROMIA BLD QL SMEAR: ABNORMAL
IMM GRANULOCYTES # BLD AUTO: 0 K/UL (ref 0–0.04)
IMM GRANULOCYTES NFR BLD AUTO: 0 % (ref 0–0.5)
LYMPHOCYTES # BLD AUTO: 0 K/UL (ref 1–4.8)
LYMPHOCYTES NFR BLD: 60 % (ref 18–48)
MAGNESIUM SERPL-MCNC: 1.6 MG/DL (ref 1.6–2.6)
MCH RBC QN AUTO: 25 PG (ref 27–31)
MCHC RBC AUTO-ENTMCNC: 30.2 G/DL (ref 32–36)
MCV RBC AUTO: 83 FL (ref 82–98)
MONOCYTES # BLD AUTO: 0 K/UL (ref 0.3–1)
MONOCYTES NFR BLD: 40 % (ref 4–15)
NEUTROPHILS # BLD AUTO: 0 K/UL (ref 1.8–7.7)
NEUTROPHILS NFR BLD: 0 % (ref 38–73)
NRBC BLD-RTO: 0 /100 WBC
OVALOCYTES BLD QL SMEAR: ABNORMAL
PHOSPHATE SERPL-MCNC: 3.1 MG/DL (ref 2.7–4.5)
PLATELET # BLD AUTO: 11 K/UL (ref 150–350)
PLATELET BLD QL SMEAR: ABNORMAL
PMV BLD AUTO: ABNORMAL FL (ref 9.2–12.9)
POIKILOCYTOSIS BLD QL SMEAR: ABNORMAL
POLYCHROMASIA BLD QL SMEAR: ABNORMAL
POTASSIUM SERPL-SCNC: 3.6 MMOL/L (ref 3.5–5.1)
PROT SERPL-MCNC: 5.4 G/DL (ref 6–8.4)
RBC # BLD AUTO: 2.8 M/UL (ref 4–5.4)
SODIUM SERPL-SCNC: 141 MMOL/L (ref 136–145)
WBC # BLD AUTO: 0.05 K/UL (ref 3.9–12.7)

## 2020-02-22 PROCEDURE — 80053 COMPREHEN METABOLIC PANEL: CPT | Mod: HCNC

## 2020-02-22 PROCEDURE — 63600175 PHARM REV CODE 636 W HCPCS: Mod: HCNC | Performed by: INTERNAL MEDICINE

## 2020-02-22 PROCEDURE — 25000003 PHARM REV CODE 250: Mod: HCNC | Performed by: NURSE PRACTITIONER

## 2020-02-22 PROCEDURE — 63600175 PHARM REV CODE 636 W HCPCS: Mod: JG,HCNC | Performed by: INTERNAL MEDICINE

## 2020-02-22 PROCEDURE — A9155 ARTIFICIAL SALIVA: HCPCS | Mod: HCNC | Performed by: INTERNAL MEDICINE

## 2020-02-22 PROCEDURE — 63600175 PHARM REV CODE 636 W HCPCS: Mod: HCNC | Performed by: NURSE PRACTITIONER

## 2020-02-22 PROCEDURE — 84100 ASSAY OF PHOSPHORUS: CPT | Mod: HCNC

## 2020-02-22 PROCEDURE — 85025 COMPLETE CBC W/AUTO DIFF WBC: CPT | Mod: HCNC

## 2020-02-22 PROCEDURE — 99900035 HC TECH TIME PER 15 MIN (STAT): Mod: HCNC

## 2020-02-22 PROCEDURE — 25000003 PHARM REV CODE 250: Mod: HCNC | Performed by: STUDENT IN AN ORGANIZED HEALTH CARE EDUCATION/TRAINING PROGRAM

## 2020-02-22 PROCEDURE — 94761 N-INVAS EAR/PLS OXIMETRY MLT: CPT | Mod: HCNC

## 2020-02-22 PROCEDURE — 99233 PR SUBSEQUENT HOSPITAL CARE,LEVL III: ICD-10-PCS | Mod: HCNC,,, | Performed by: INTERNAL MEDICINE

## 2020-02-22 PROCEDURE — 25000003 PHARM REV CODE 250: Mod: HCNC | Performed by: INTERNAL MEDICINE

## 2020-02-22 PROCEDURE — 83735 ASSAY OF MAGNESIUM: CPT | Mod: HCNC

## 2020-02-22 PROCEDURE — 20600001 HC STEP DOWN PRIVATE ROOM: Mod: HCNC

## 2020-02-22 PROCEDURE — 99233 SBSQ HOSP IP/OBS HIGH 50: CPT | Mod: HCNC,,, | Performed by: INTERNAL MEDICINE

## 2020-02-22 RX ORDER — LOPERAMIDE HYDROCHLORIDE 2 MG/1
2 CAPSULE ORAL 4 TIMES DAILY PRN
Status: DISCONTINUED | OUTPATIENT
Start: 2020-02-22 | End: 2020-02-26 | Stop reason: HOSPADM

## 2020-02-22 RX ADMIN — Medication 30 ML: at 09:02

## 2020-02-22 RX ADMIN — Medication 400 MG: at 06:02

## 2020-02-22 RX ADMIN — SERTRALINE HYDROCHLORIDE 50 MG: 50 TABLET ORAL at 09:02

## 2020-02-22 RX ADMIN — ONDANSETRON 8 MG: 2 INJECTION INTRAMUSCULAR; INTRAVENOUS at 08:02

## 2020-02-22 RX ADMIN — Medication 30 ML: at 05:02

## 2020-02-22 RX ADMIN — Medication 30 ML: at 01:02

## 2020-02-22 RX ADMIN — LEVOFLOXACIN 500 MG: 500 TABLET, FILM COATED ORAL at 09:02

## 2020-02-22 RX ADMIN — GABAPENTIN 300 MG: 300 CAPSULE ORAL at 09:02

## 2020-02-22 RX ADMIN — GABAPENTIN 600 MG: 300 CAPSULE ORAL at 08:02

## 2020-02-22 RX ADMIN — Medication 400 MG: at 01:02

## 2020-02-22 RX ADMIN — OYSTER SHELL CALCIUM WITH VITAMIN D 1 TABLET: 500; 200 TABLET, FILM COATED ORAL at 09:02

## 2020-02-22 RX ADMIN — FLUCONAZOLE 400 MG: 200 TABLET ORAL at 09:02

## 2020-02-22 RX ADMIN — OYSTER SHELL CALCIUM WITH VITAMIN D 1 TABLET: 500; 200 TABLET, FILM COATED ORAL at 08:02

## 2020-02-22 RX ADMIN — Medication 400 MG: at 09:02

## 2020-02-22 RX ADMIN — LOPERAMIDE HYDROCHLORIDE 2 MG: 2 CAPSULE ORAL at 06:02

## 2020-02-22 RX ADMIN — FILGRASTIM 300 MCG: 480 INJECTION, SOLUTION INTRAVENOUS; SUBCUTANEOUS at 08:02

## 2020-02-22 RX ADMIN — SODIUM CHLORIDE: 0.9 INJECTION, SOLUTION INTRAVENOUS at 03:02

## 2020-02-22 RX ADMIN — Medication 30 ML: at 08:02

## 2020-02-22 RX ADMIN — LOPERAMIDE HYDROCHLORIDE 2 MG: 2 CAPSULE ORAL at 09:02

## 2020-02-22 RX ADMIN — ONDANSETRON 8 MG: 2 INJECTION INTRAMUSCULAR; INTRAVENOUS at 03:02

## 2020-02-22 RX ADMIN — POTASSIUM CHLORIDE 20 MEQ: 1500 TABLET, EXTENDED RELEASE ORAL at 06:02

## 2020-02-22 RX ADMIN — PANTOPRAZOLE SODIUM 40 MG: 40 TABLET, DELAYED RELEASE ORAL at 09:02

## 2020-02-22 RX ADMIN — ACYCLOVIR 800 MG: 200 CAPSULE ORAL at 08:02

## 2020-02-22 RX ADMIN — ACYCLOVIR 800 MG: 200 CAPSULE ORAL at 09:02

## 2020-02-22 NOTE — ASSESSMENT & PLAN NOTE
-Today is day +10  -received Melphalan 140mg/m2 without difficulty  -stem cell infusion was on 2/12 without issue, received 3 bags (2.85 x 10^6) CD 34 stem cells  -some expected fatigue, poor appetite, not watery diarrhea, and nausea which is improved with scheduled zofran and PRN Compazine    Regimen as below:  Transplant Day -2: IV fluids  Transplant Day -1 through Day 0: Antiemetics   Transplant Day -1: Melphalan   Transplant Day 0: Stem Cell Infusion   Transplant Day +7: Growth factor begins

## 2020-02-22 NOTE — PROGRESS NOTES
[facility]  Hematology  Bone Marrow Transplant  Progress Note    Patient Name: Shelby Thompson  Admission Date: 2/10/2020  Hospital Length of Stay: 12 days  Code Status: Full Code    Subjective:     Interval History:   Day + 10 [facility] Auto SCT for MM.   ANC 0.   NAEON, HDS.   2 BM overnight, C.diff negative, started PRN Immodium.     Objective:     Vital Signs (Most Recent):  Temp: 98 °F (36.7 °C) (02/22/20 0406)  Pulse: 80 (02/22/20 0406)  Resp: 16 (02/22/20 0406)  BP: 107/65 (02/22/20 0406)  SpO2: 97 % (02/22/20 0406) Vital Signs (24h Range):  Temp:  [97.5 °F (36.4 °C)-98.1 °F (36.7 °C)] 98 °F (36.7 °C)  Pulse:  [62-89] 80  Resp:  [16-17] 16  SpO2:  [97 %-100 %] 97 %  BP: ()/(58-65) 107/65     Weight: 71.1 kg (156 lb 12 oz)  Body mass index is 27.77 kg/m².  Body surface area is 1.78 meters squared.      Intake/Output - Last 3 Shifts       02/20 0700 - 02/21 0659 02/21 0700 - 02/22 0659 02/22 0700 - 02/23 0659    P.O. 1140 1220     I.V. (mL/kg)  1793.3 (25.2)     Total Intake(mL/kg) 1140 (16.3) 3013.3 (42.4)     Urine (mL/kg/hr) 600 (0.4) 775 (0.5)     Stool 0 0     Total Output 600 775     Net +540 +2238.3            Urine Occurrence 1 x 2 x     Stool Occurrence 3 x 4 x           Physical Exam   Constitutional: She is oriented to person, place, and time. Vital signs are normal. She appears well-developed and well-nourished. She is cooperative.   HENT:   Head: Normocephalic.   Eyes: Pupils are equal, round, and reactive to light. Conjunctivae, EOM and lids are normal.   Neck: Trachea normal and normal range of motion.   Cardiovascular: Normal rate, regular rhythm, S1 normal, S2 normal and normal heart sounds.   Pulmonary/Chest: Effort normal and breath sounds normal.   Abdominal: Soft. Normal appearance and bowel sounds are normal.   Musculoskeletal: Normal range of motion.   Chronic tremor to RUE 2/2 stroke   Neurological: She is alert and oriented to person, place, and time. Coordination  and gait normal.   Skin: Skin is dry and intact. She is not diaphoretic. No pallor.   vascath to chest wall c/d/i   Psychiatric: She has a normal mood and affect. Her speech is normal and behavior is normal. Judgment and thought content normal. Cognition and memory are normal.       Significant Labs:   CBC:   Recent Labs   Lab 02/21/20 0434 02/22/20  0412   WBC 0.04* 0.05*   HGB 8.7* 7.0*   HCT 28.1* 23.2*   PLT 29*  --     and CMP:   Recent Labs   Lab 02/21/20 0434 02/22/20  0412    141   K 3.6 3.6    111*   CO2 25 22*   GLU 87 82   BUN 11 10   CREATININE 0.9 0.8   CALCIUM 8.6* 8.0*   PROT 6.5 5.4*   ALBUMIN 3.5 3.0*   BILITOT 0.4 0.4   ALKPHOS 55 47*   AST 14 10   ALT 12 9*   ANIONGAP 8 8   EGFRNONAA >60.0 >60.0       Diagnostic Results:  None    Assessment/Plan:     * Status post autologous bone marrow transplant  -Today is day +10  -received Melphalan 140mg/m2 without difficulty  -stem cell infusion was on 2/12 without issue, received 3 bags (2.85 x 10^6) CD 34 stem cells  -some expected fatigue, poor appetite, not watery diarrhea, and nausea which is improved with scheduled zofran and PRN Compazine    Regimen as below:  Transplant Day -2: IV fluids  Transplant Day -1 through Day 0: Antiemetics   Transplant Day -1: Melphalan   Transplant Day 0: Stem Cell Infusion   Transplant Day +7: Growth factor begins    Diarrhea  Will check stool for c-diff and start imodium if neg    Chemotherapy-induced nausea  - continue scheduled Zofran  - PRN compazine and Ativan    Type 2 diabetes mellitus without complication, without long-term current use of insulin  -monitor closely while inpatient; not on active medication     Essential hypertension  -continue norvasc       Drug-induced polyneuropathy  -pt has had numbness/tingling to hands and BUE tremors post CVA, for which she is on gabapentin 600 mg BID  - prn tramadol     Pancytopenia due to antineoplastic chemotherapy  -hx of JESSE; received single dose feraheme  pre transplant  -colonoscopy okay for transplant  -will monitor daily CBC inpatient  -transfuse for Hgb <7, Plt <10K or bleeding      Interstitial lung disease  - had abnormal PFTs during transplant evaluation 10/2019; seen by pulm; no significant desaturation on 6MWT; okay per transplant per pulm     Multiple myeloma in remission  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) High Risk disease; complex karyotype by CG; , t(14;20), monosomy 13 on FISH  -She has achieved and tolerated well VRd x completing 7, 28 day cycles and achieving stringent CR as of Dec 2019 marrow/biochemical studies   -adequate response to proceed with autoSCT  -she has PS 1-2 and multiple abnormalities in her pre transplant eval that have been cleared by pulmonology, cardiology, gynecology and are not prohibitive to proceed with transplant  -she has JESSE - sp  one dose of iv feraheme with response  will need reassessment by GI; if colonosocpy normal  proceed with transplant   -no further velcade maintenance pre transplant, last dose was 12/24/19  -plan for reduced dose clifton 140mg/m2 in light of age    Recurrent major depressive disorder, in full remission  -continue zoloft     Liver mass  -vascular per recent MRI in 09/2019 with no changes; seen by hepatology pre-transplant and signed off; recommend repeat MRI in 6 months     History of CVA with residual deficit  -one in 2008, one in 2011  -has been cleared by cardiology for transplant     Hyperlipemia  -on praluent, PCSK9 inhibitor; holding during admission     Personal history of malignant neoplasm of breast  L breast cancer DCIS stage 0  -s/p lumpectomy and radiation, no endocrine tx due to CVA         VTE Risk Mitigation (From admission, onward)         Ordered     heparin (porcine) injection 1,600 Units  As needed (PRN)      02/10/20 2141     IP VTE HIGH RISK PATIENT  Once      02/10/20 1738                Disposition: bmt unit    [place]  Jesus Manuel Kuhn MD  Bone Marrow Transplant  [facility]

## 2020-02-22 NOTE — PLAN OF CARE
Patient remained free from falls throughout shift, call bell within reach. Day +10 following her Auto SCT. Cdiff negative. 2 BMs overnight. Poor appetite. No complaints of pain or discomfort. Vitals stable, will continue to monitor.

## 2020-02-22 NOTE — SUBJECTIVE & OBJECTIVE
Subjective:     Interval History:   Day + 10 [facility] Auto SCT for MM.   ANC 0.   NAEON, HDS.   2 BM overnight, C.diff negative, started PRN Immodium.     Objective:     Vital Signs (Most Recent):  Temp: 98 °F (36.7 °C) (02/22/20 0406)  Pulse: 80 (02/22/20 0406)  Resp: 16 (02/22/20 0406)  BP: 107/65 (02/22/20 0406)  SpO2: 97 % (02/22/20 0406) Vital Signs (24h Range):  Temp:  [97.5 °F (36.4 °C)-98.1 °F (36.7 °C)] 98 °F (36.7 °C)  Pulse:  [62-89] 80  Resp:  [16-17] 16  SpO2:  [97 %-100 %] 97 %  BP: ()/(58-65) 107/65     Weight: 71.1 kg (156 lb 12 oz)  Body mass index is 27.77 kg/m².  Body surface area is 1.78 meters squared.      Intake/Output - Last 3 Shifts       02/20 0700 - 02/21 0659 02/21 0700 - 02/22 0659 02/22 0700 - 02/23 0659    P.O. 1140 1220     I.V. (mL/kg)  1793.3 (25.2)     Total Intake(mL/kg) 1140 (16.3) 3013.3 (42.4)     Urine (mL/kg/hr) 600 (0.4) 775 (0.5)     Stool 0 0     Total Output 600 775     Net +540 +2238.3            Urine Occurrence 1 x 2 x     Stool Occurrence 3 x 4 x           Physical Exam   Constitutional: She is oriented to person, place, and time. Vital signs are normal. She appears well-developed and well-nourished. She is cooperative.   HENT:   Head: Normocephalic.   Eyes: Pupils are equal, round, and reactive to light. Conjunctivae, EOM and lids are normal.   Neck: Trachea normal and normal range of motion.   Cardiovascular: Normal rate, regular rhythm, S1 normal, S2 normal and normal heart sounds.   Pulmonary/Chest: Effort normal and breath sounds normal.   Abdominal: Soft. Normal appearance and bowel sounds are normal.   Musculoskeletal: Normal range of motion.   Chronic tremor to RUE 2/2 stroke   Neurological: She is alert and oriented to person, place, and time. Coordination and gait normal.   Skin: Skin is dry and intact. She is not diaphoretic. No pallor.   vascath to chest wall c/d/i   Psychiatric: She has a normal mood and affect. Her speech is normal and behavior is  normal. Judgment and thought content normal. Cognition and memory are normal.       Significant Labs:   CBC:   Recent Labs   Lab 02/21/20  0434 02/22/20 0412   WBC 0.04* 0.05*   HGB 8.7* 7.0*   HCT 28.1* 23.2*   PLT 29*  --     and CMP:   Recent Labs   Lab 02/21/20 0434 02/22/20 0412    141   K 3.6 3.6    111*   CO2 25 22*   GLU 87 82   BUN 11 10   CREATININE 0.9 0.8   CALCIUM 8.6* 8.0*   PROT 6.5 5.4*   ALBUMIN 3.5 3.0*   BILITOT 0.4 0.4   ALKPHOS 55 47*   AST 14 10   ALT 12 9*   ANIONGAP 8 8   EGFRNONAA >60.0 >60.0       Diagnostic Results:  None

## 2020-02-22 NOTE — PLAN OF CARE
Pt. Day +10 for AutoSCT.  Pt. with nonskid footwear on with bed in lowest position and locked with bed rails up x2.  Pt. ambulates independently and instructed to call if assistance is needed.  Pt. with call light within reach.  Pt. With norvasc held this morning per MD.  Pt. with c/o a loose BM this morning with imodium given PRN.  Pt. With a fair appetite.  Pt. With electrolytes replaced today.  Will continue to monitor pt.

## 2020-02-22 NOTE — ASSESSMENT & PLAN NOTE
-hx of JESSE; received single dose feraheme pre transplant  -colonoscopy okay for transplant  -will monitor daily CBC inpatient  -transfuse for Hgb <7, Plt <10K or bleeding

## 2020-02-23 LAB
ALBUMIN SERPL BCP-MCNC: 3 G/DL (ref 3.5–5.2)
ALP SERPL-CCNC: 46 U/L (ref 55–135)
ALT SERPL W/O P-5'-P-CCNC: 9 U/L (ref 10–44)
ANION GAP SERPL CALC-SCNC: 6 MMOL/L (ref 8–16)
ANISOCYTOSIS BLD QL SMEAR: SLIGHT
AST SERPL-CCNC: 10 U/L (ref 10–40)
BASOPHILS # BLD AUTO: 0 K/UL (ref 0–0.2)
BASOPHILS NFR BLD: 0 % (ref 0–1.9)
BILIRUB SERPL-MCNC: 0.4 MG/DL (ref 0.1–1)
BLD PROD TYP BPU: NORMAL
BLD PROD TYP BPU: NORMAL
BLOOD UNIT EXPIRATION DATE: NORMAL
BLOOD UNIT EXPIRATION DATE: NORMAL
BLOOD UNIT TYPE CODE: 5100
BLOOD UNIT TYPE CODE: 6200
BLOOD UNIT TYPE: NORMAL
BLOOD UNIT TYPE: NORMAL
BUN SERPL-MCNC: 5 MG/DL (ref 8–23)
CALCIUM SERPL-MCNC: 7.6 MG/DL (ref 8.7–10.5)
CHLORIDE SERPL-SCNC: 112 MMOL/L (ref 95–110)
CO2 SERPL-SCNC: 23 MMOL/L (ref 23–29)
CODING SYSTEM: NORMAL
CODING SYSTEM: NORMAL
CREAT SERPL-MCNC: 0.7 MG/DL (ref 0.5–1.4)
DACRYOCYTES BLD QL SMEAR: ABNORMAL
DIFFERENTIAL METHOD: ABNORMAL
DISPENSE STATUS: NORMAL
DISPENSE STATUS: NORMAL
EOSINOPHIL # BLD AUTO: 0 K/UL (ref 0–0.5)
EOSINOPHIL NFR BLD: 0 % (ref 0–8)
ERYTHROCYTE [DISTWIDTH] IN BLOOD BY AUTOMATED COUNT: 19.1 % (ref 11.5–14.5)
EST. GFR  (AFRICAN AMERICAN): >60 ML/MIN/1.73 M^2
EST. GFR  (NON AFRICAN AMERICAN): >60 ML/MIN/1.73 M^2
GLUCOSE SERPL-MCNC: 80 MG/DL (ref 70–110)
HCT VFR BLD AUTO: 21.8 % (ref 37–48.5)
HGB BLD-MCNC: 6.8 G/DL (ref 12–16)
HYPOCHROMIA BLD QL SMEAR: ABNORMAL
IMM GRANULOCYTES # BLD AUTO: 0 K/UL (ref 0–0.04)
IMM GRANULOCYTES NFR BLD AUTO: 0 % (ref 0–0.5)
LYMPHOCYTES # BLD AUTO: 0 K/UL (ref 1–4.8)
LYMPHOCYTES NFR BLD: 50 % (ref 18–48)
MAGNESIUM SERPL-MCNC: 1.5 MG/DL (ref 1.6–2.6)
MCH RBC QN AUTO: 25.8 PG (ref 27–31)
MCHC RBC AUTO-ENTMCNC: 31.2 G/DL (ref 32–36)
MCV RBC AUTO: 83 FL (ref 82–98)
MONOCYTES # BLD AUTO: 0 K/UL (ref 0.3–1)
MONOCYTES NFR BLD: 33.3 % (ref 4–15)
NEUTROPHILS # BLD AUTO: 0 K/UL (ref 1.8–7.7)
NEUTROPHILS NFR BLD: 16.7 % (ref 38–73)
NRBC BLD-RTO: 0 /100 WBC
NUM UNITS TRANS PACKED RBC: NORMAL
NUM UNITS TRANS WBC-POOR PLATPHERESIS: NORMAL
OVALOCYTES BLD QL SMEAR: ABNORMAL
PHOSPHATE SERPL-MCNC: 2.5 MG/DL (ref 2.7–4.5)
PLATELET # BLD AUTO: 3 K/UL (ref 150–350)
PLATELET BLD QL SMEAR: ABNORMAL
PMV BLD AUTO: ABNORMAL FL (ref 9.2–12.9)
POIKILOCYTOSIS BLD QL SMEAR: ABNORMAL
POLYCHROMASIA BLD QL SMEAR: ABNORMAL
POTASSIUM SERPL-SCNC: 3.5 MMOL/L (ref 3.5–5.1)
PROT SERPL-MCNC: 5.4 G/DL (ref 6–8.4)
RBC # BLD AUTO: 2.64 M/UL (ref 4–5.4)
SODIUM SERPL-SCNC: 141 MMOL/L (ref 136–145)
WBC # BLD AUTO: 0.06 K/UL (ref 3.9–12.7)

## 2020-02-23 PROCEDURE — 99233 PR SUBSEQUENT HOSPITAL CARE,LEVL III: ICD-10-PCS | Mod: HCNC,,, | Performed by: INTERNAL MEDICINE

## 2020-02-23 PROCEDURE — 25000003 PHARM REV CODE 250: Mod: HCNC | Performed by: STUDENT IN AN ORGANIZED HEALTH CARE EDUCATION/TRAINING PROGRAM

## 2020-02-23 PROCEDURE — 99233 SBSQ HOSP IP/OBS HIGH 50: CPT | Mod: HCNC,,, | Performed by: INTERNAL MEDICINE

## 2020-02-23 PROCEDURE — 80053 COMPREHEN METABOLIC PANEL: CPT | Mod: HCNC

## 2020-02-23 PROCEDURE — P9040 RBC LEUKOREDUCED IRRADIATED: HCPCS | Mod: HCNC

## 2020-02-23 PROCEDURE — P9037 PLATE PHERES LEUKOREDU IRRAD: HCPCS | Mod: HCNC

## 2020-02-23 PROCEDURE — 83735 ASSAY OF MAGNESIUM: CPT | Mod: HCNC

## 2020-02-23 PROCEDURE — 36430 TRANSFUSION BLD/BLD COMPNT: CPT | Mod: HCNC

## 2020-02-23 PROCEDURE — 25000003 PHARM REV CODE 250: Mod: HCNC | Performed by: INTERNAL MEDICINE

## 2020-02-23 PROCEDURE — 20600001 HC STEP DOWN PRIVATE ROOM: Mod: HCNC

## 2020-02-23 PROCEDURE — A9155 ARTIFICIAL SALIVA: HCPCS | Mod: HCNC | Performed by: INTERNAL MEDICINE

## 2020-02-23 PROCEDURE — 25000003 PHARM REV CODE 250: Mod: HCNC | Performed by: NURSE PRACTITIONER

## 2020-02-23 PROCEDURE — 63600175 PHARM REV CODE 636 W HCPCS: Mod: HCNC | Performed by: NURSE PRACTITIONER

## 2020-02-23 PROCEDURE — 63600175 PHARM REV CODE 636 W HCPCS: Mod: JG,HCNC | Performed by: INTERNAL MEDICINE

## 2020-02-23 PROCEDURE — 63600175 PHARM REV CODE 636 W HCPCS: Mod: HCNC | Performed by: INTERNAL MEDICINE

## 2020-02-23 PROCEDURE — 85025 COMPLETE CBC W/AUTO DIFF WBC: CPT | Mod: HCNC

## 2020-02-23 PROCEDURE — 84100 ASSAY OF PHOSPHORUS: CPT | Mod: HCNC

## 2020-02-23 RX ORDER — DIPHENHYDRAMINE HCL 25 MG
25 CAPSULE ORAL EVERY 6 HOURS PRN
Status: DISCONTINUED | OUTPATIENT
Start: 2020-02-23 | End: 2020-02-26 | Stop reason: HOSPADM

## 2020-02-23 RX ORDER — HYDROCODONE BITARTRATE AND ACETAMINOPHEN 500; 5 MG/1; MG/1
TABLET ORAL
Status: DISCONTINUED | OUTPATIENT
Start: 2020-02-23 | End: 2020-02-24

## 2020-02-23 RX ORDER — ACETAMINOPHEN 325 MG/1
650 TABLET ORAL EVERY 6 HOURS PRN
Status: DISCONTINUED | OUTPATIENT
Start: 2020-02-23 | End: 2020-02-26 | Stop reason: HOSPADM

## 2020-02-23 RX ADMIN — Medication 30 ML: at 08:02

## 2020-02-23 RX ADMIN — POTASSIUM & SODIUM PHOSPHATES POWDER PACK 280-160-250 MG 1 PACKET: 280-160-250 PACK at 06:02

## 2020-02-23 RX ADMIN — OYSTER SHELL CALCIUM WITH VITAMIN D 1 TABLET: 500; 200 TABLET, FILM COATED ORAL at 08:02

## 2020-02-23 RX ADMIN — LOPERAMIDE HYDROCHLORIDE 2 MG: 2 CAPSULE ORAL at 01:02

## 2020-02-23 RX ADMIN — FILGRASTIM 300 MCG: 480 INJECTION, SOLUTION INTRAVENOUS; SUBCUTANEOUS at 08:02

## 2020-02-23 RX ADMIN — ACYCLOVIR 800 MG: 200 CAPSULE ORAL at 08:02

## 2020-02-23 RX ADMIN — Medication 400 MG: at 01:02

## 2020-02-23 RX ADMIN — PANTOPRAZOLE SODIUM 40 MG: 40 TABLET, DELAYED RELEASE ORAL at 09:02

## 2020-02-23 RX ADMIN — POTASSIUM & SODIUM PHOSPHATES POWDER PACK 280-160-250 MG 2 PACKET: 280-160-250 PACK at 09:02

## 2020-02-23 RX ADMIN — POTASSIUM CHLORIDE 20 MEQ: 1500 TABLET, EXTENDED RELEASE ORAL at 06:02

## 2020-02-23 RX ADMIN — LEVOFLOXACIN 500 MG: 500 TABLET, FILM COATED ORAL at 09:02

## 2020-02-23 RX ADMIN — DIPHENHYDRAMINE HYDROCHLORIDE 25 MG: 25 CAPSULE ORAL at 12:02

## 2020-02-23 RX ADMIN — GABAPENTIN 300 MG: 300 CAPSULE ORAL at 09:02

## 2020-02-23 RX ADMIN — ACETAMINOPHEN 650 MG: 325 TABLET ORAL at 12:02

## 2020-02-23 RX ADMIN — AMLODIPINE BESYLATE 5 MG: 5 TABLET ORAL at 09:02

## 2020-02-23 RX ADMIN — Medication 400 MG: at 06:02

## 2020-02-23 RX ADMIN — Medication 30 ML: at 05:02

## 2020-02-23 RX ADMIN — FLUCONAZOLE 400 MG: 200 TABLET ORAL at 09:02

## 2020-02-23 RX ADMIN — Medication 400 MG: at 09:02

## 2020-02-23 RX ADMIN — SERTRALINE HYDROCHLORIDE 50 MG: 50 TABLET ORAL at 09:02

## 2020-02-23 RX ADMIN — SODIUM CHLORIDE: 0.9 INJECTION, SOLUTION INTRAVENOUS at 12:02

## 2020-02-23 RX ADMIN — ONDANSETRON 8 MG: 2 INJECTION INTRAMUSCULAR; INTRAVENOUS at 03:02

## 2020-02-23 RX ADMIN — POTASSIUM CHLORIDE 20 MEQ: 1500 TABLET, EXTENDED RELEASE ORAL at 09:02

## 2020-02-23 RX ADMIN — ACYCLOVIR 800 MG: 200 CAPSULE ORAL at 09:02

## 2020-02-23 RX ADMIN — GABAPENTIN 600 MG: 300 CAPSULE ORAL at 08:02

## 2020-02-23 RX ADMIN — OYSTER SHELL CALCIUM WITH VITAMIN D 1 TABLET: 500; 200 TABLET, FILM COATED ORAL at 09:02

## 2020-02-23 RX ADMIN — ONDANSETRON 8 MG: 2 INJECTION INTRAMUSCULAR; INTRAVENOUS at 08:02

## 2020-02-23 RX ADMIN — Medication 30 ML: at 09:02

## 2020-02-23 RX ADMIN — Medication 30 ML: at 01:02

## 2020-02-23 RX ADMIN — POTASSIUM & SODIUM PHOSPHATES POWDER PACK 280-160-250 MG 1 PACKET: 280-160-250 PACK at 01:02

## 2020-02-23 RX ADMIN — LOPERAMIDE HYDROCHLORIDE 2 MG: 2 CAPSULE ORAL at 06:02

## 2020-02-23 NOTE — PROGRESS NOTES
[facility]  Hematology  Bone Marrow Transplant  Progress Note    Patient Name: Shelby Thompson  Admission Date: 2/10/2020  Hospital Length of Stay: 13 days  Code Status: Full Code    Subjective:     Interval History:   Day + 11 [facility] SCT for MM.   ANC 0.   NAEON, HDS.   Complaining of abdominal bloating  Platelet count is 3, given 1pack platelets  Hgb 6.8, ordered 1u PRBCs    Objective:     Vital Signs (Most Recent):  Temp: 97.7 °F (36.5 °C) (02/23/20 0731)  Pulse: 64 (02/23/20 0731)  Resp: 18 (02/23/20 0731)  BP: (!) 112/58 (02/23/20 0731)  SpO2: 100 % (02/23/20 0731) Vital Signs (24h Range):  Temp:  [97.2 °F (36.2 °C)-98.5 °F (36.9 °C)] 97.7 °F (36.5 °C)  Pulse:  [63-78] 64  Resp:  [15-18] 18  SpO2:  [92 %-100 %] 100 %  BP: (103-116)/(53-74) 112/58     Weight: 72.1 kg (158 lb 15.2 oz)  Body mass index is 28.16 kg/m².  Body surface area is 1.79 meters squared.      Intake/Output - Last 3 Shifts       02/21 0700 - 02/22 0659 02/22 0700 - 02/23 0659 02/23 0700 - 02/24 0659    P.O. 1220 1420     I.V. (mL/kg) 1793.3 (25.2) 2103 (29.2)     Total Intake(mL/kg) 3013.3 (42.4) 3523 (48.9)     Urine (mL/kg/hr) 775 (0.5) 2050 (1.2)     Stool 0 0     Total Output 775 2050     Net +2238.3 +1473            Urine Occurrence 2 x 1 x     Stool Occurrence 4 x 2 x           Physical Exam   Constitutional: She is oriented to person, place, and time. Vital signs are normal. She appears well-developed and well-nourished. She is cooperative.   HENT:   Head: Normocephalic.   Eyes: Pupils are equal, round, and reactive to light. Conjunctivae, EOM and lids are normal.   Neck: Trachea normal and normal range of motion.   Cardiovascular: Normal rate, regular rhythm, S1 normal, S2 normal and normal heart sounds.   Pulmonary/Chest: Effort normal and breath sounds normal.   Abdominal: Soft. Normal appearance and bowel sounds are normal.   Musculoskeletal: Normal range of motion.   Chronic tremor to RUE 2/2 stroke    Neurological: She is alert and oriented to person, place, and time. Coordination and gait normal.   Skin: Skin is dry and intact. She is not diaphoretic. No pallor.   vascath to chest wall c/d/i   Psychiatric: She has a normal mood and affect. Her speech is normal and behavior is normal. Judgment and thought content normal. Cognition and memory are normal.       Significant Labs:   CBC:   Recent Labs   Lab 02/22/20 0412 02/23/20  0356   WBC 0.05* 0.06*   HGB 7.0* 6.8*   HCT 23.2* 21.8*   PLT 11* 3*    and CMP:   Recent Labs   Lab 02/22/20 0412 02/23/20  0356    141   K 3.6 3.5   * 112*   CO2 22* 23   GLU 82 80   BUN 10 5*   CREATININE 0.8 0.7   CALCIUM 8.0* 7.6*   PROT 5.4* 5.4*   ALBUMIN 3.0* 3.0*   BILITOT 0.4 0.4   ALKPHOS 47* 46*   AST 10 10   ALT 9* 9*   ANIONGAP 8 6*   EGFRNONAA >60.0 >60.0       Diagnostic Results:  None    Assessment/Plan:     * Status post autologous bone marrow transplant  -Today is day +11  -received Melphalan 140mg/m2 without difficulty  -stem cell infusion was on 2/12 without issue, received 3 bags (2.85 x 10^6) CD 34 stem cells  -some expected fatigue, poor appetite, not watery diarrhea, and nausea which is improved with scheduled zofran and PRN Compazine    Regimen as below:  Transplant Day -2: IV fluids  Transplant Day -1 through Day 0: Antiemetics   Transplant Day -1: Melphalan   Transplant Day 0: Stem Cell Infusion   Transplant Day +7: Growth factor begins    Diarrhea  Will check stool for c-diff and start imodium if neg    Chemotherapy-induced nausea  - continue scheduled Zofran  - PRN compazine and Ativan    Type 2 diabetes mellitus without complication, without long-term current use of insulin  -monitor closely while inpatient; not on active medication     Essential hypertension  -continue norvasc       Drug-induced polyneuropathy  -pt has had numbness/tingling to hands and BUE tremors post CVA, for which she is on gabapentin 600 mg BID  - prn tramadol      Pancytopenia due to antineoplastic chemotherapy  -hx of JESSE; received single dose feraheme pre transplant  -colonoscopy okay for transplant  -will monitor daily CBC inpatient  -transfuse for Hgb <7, Plt <10K or bleeding      Interstitial lung disease  - had abnormal PFTs during transplant evaluation 10/2019; seen by pulm; no significant desaturation on 6MWT; okay per transplant per pulm     Multiple myeloma in remission  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) High Risk disease; complex karyotype by CG; , t(14;20), monosomy 13 on FISH  -She has achieved and tolerated well VRd x completing 7, 28 day cycles and achieving stringent CR as of Dec 2019 marrow/biochemical studies   -adequate response to proceed with autoSCT  -she has PS 1-2 and multiple abnormalities in her pre transplant eval that have been cleared by pulmonology, cardiology, gynecology and are not prohibitive to proceed with transplant  -she has JESSE - sp  one dose of iv feraheme with response  will need reassessment by GI; if colonosocpy normal  proceed with transplant   -no further velcade maintenance pre transplant, last dose was 12/24/19  -plan for reduced dose clifton 140mg/m2 in light of age    Recurrent major depressive disorder, in full remission  -continue zoloft     Liver mass  -vascular per recent MRI in 09/2019 with no changes; seen by hepatology pre-transplant and signed off; recommend repeat MRI in 6 months     History of CVA with residual deficit  -one in 2008, one in 2011  -has been cleared by cardiology for transplant     Hyperlipemia  -on praluent, PCSK9 inhibitor; holding during admission     Personal history of malignant neoplasm of breast  L breast cancer DCIS stage 0  -s/p lumpectomy and radiation, no endocrine tx due to CVA         VTE Risk Mitigation (From admission, onward)         Ordered     heparin (porcine) injection 1,600 Units  As needed (PRN)      02/10/20 2141     IP  VTE HIGH RISK PATIENT  Once      02/10/20 8690                Disposition: bmt unit    Janes Kuhn MD  Bone Marrow Transplant  [facility]

## 2020-02-23 NOTE — PLAN OF CARE
Patient remained free from falls throughout shift, call bell within reach. Day +11 following her Auto SCT. Biggest complaint is gas discomfort. Vitals stable, will continue to monitor.

## 2020-02-23 NOTE — ASSESSMENT & PLAN NOTE
-Today is day +11  -received Melphalan 140mg/m2 without difficulty  -stem cell infusion was on 2/12 without issue, received 3 bags (2.85 x 10^6) CD 34 stem cells  -some expected fatigue, poor appetite, not watery diarrhea, and nausea which is improved with scheduled zofran and PRN Compazine    Regimen as below:  Transplant Day -2: IV fluids  Transplant Day -1 through Day 0: Antiemetics   Transplant Day -1: Melphalan   Transplant Day 0: Stem Cell Infusion   Transplant Day +7: Growth factor begins

## 2020-02-23 NOTE — SUBJECTIVE & OBJECTIVE
Subjective:     Interval History:   Day + 11 [facility] Auto SCT for MM.   ANC 0.   NAEON, HDS.   Complaining of abdominal bloating  Platelet count is 3, given 1pack platelets  Hgb 6.8, ordered 1u PRBCs    Objective:     Vital Signs (Most Recent):  Temp: 97.7 °F (36.5 °C) (02/23/20 0731)  Pulse: 64 (02/23/20 0731)  Resp: 18 (02/23/20 0731)  BP: (!) 112/58 (02/23/20 0731)  SpO2: 100 % (02/23/20 0731) Vital Signs (24h Range):  Temp:  [97.2 °F (36.2 °C)-98.5 °F (36.9 °C)] 97.7 °F (36.5 °C)  Pulse:  [63-78] 64  Resp:  [15-18] 18  SpO2:  [92 %-100 %] 100 %  BP: (103-116)/(53-74) 112/58     Weight: 72.1 kg (158 lb 15.2 oz)  Body mass index is 28.16 kg/m².  Body surface area is 1.79 meters squared.      Intake/Output - Last 3 Shifts       02/21 0700 - 02/22 0659 02/22 0700 - 02/23 0659 02/23 0700 - 02/24 0659    P.O. 1220 1420     I.V. (mL/kg) 1793.3 (25.2) 2103 (29.2)     Total Intake(mL/kg) 3013.3 (42.4) 3523 (48.9)     Urine (mL/kg/hr) 775 (0.5) 2050 (1.2)     Stool 0 0     Total Output 775 2050     Net +2238.3 +1473            Urine Occurrence 2 x 1 x     Stool Occurrence 4 x 2 x           Physical Exam   Constitutional: She is oriented to person, place, and time. Vital signs are normal. She appears well-developed and well-nourished. She is cooperative.   HENT:   Head: Normocephalic.   Eyes: Pupils are equal, round, and reactive to light. Conjunctivae, EOM and lids are normal.   Neck: Trachea normal and normal range of motion.   Cardiovascular: Normal rate, regular rhythm, S1 normal, S2 normal and normal heart sounds.   Pulmonary/Chest: Effort normal and breath sounds normal.   Abdominal: Soft. Normal appearance and bowel sounds are normal.   Musculoskeletal: Normal range of motion.   Chronic tremor to RUE 2/2 stroke   Neurological: She is alert and oriented to person, place, and time. Coordination and gait normal.   Skin: Skin is dry and intact. She is not diaphoretic. No pallor.   vascath to chest wall c/d/i    Psychiatric: She has a normal mood and affect. Her speech is normal and behavior is normal. Judgment and thought content normal. Cognition and memory are normal.       Significant Labs:   CBC:   Recent Labs   Lab 02/22/20  0412 02/23/20  0356   WBC 0.05* 0.06*   HGB 7.0* 6.8*   HCT 23.2* 21.8*   PLT 11* 3*    and CMP:   Recent Labs   Lab 02/22/20  0412 02/23/20  0356    141   K 3.6 3.5   * 112*   CO2 22* 23   GLU 82 80   BUN 10 5*   CREATININE 0.8 0.7   CALCIUM 8.0* 7.6*   PROT 5.4* 5.4*   ALBUMIN 3.0* 3.0*   BILITOT 0.4 0.4   ALKPHOS 47* 46*   AST 10 10   ALT 9* 9*   ANIONGAP 8 6*   EGFRNONAA >60.0 >60.0       Diagnostic Results:  None

## 2020-02-23 NOTE — PLAN OF CARE
Pt. Day +11 for AutoSCT.  Pt. with nonskid footwear on with bed in lowest position and locked with bed rails up x2.  Pt. ambulates independently and instructed to call if assistance is needed.  Pt. with call light within reach.  Pt. given 1 unit of blood and platelets this afternoon.  Pt. with c/o a loose BM with imodium given PRN.  Pt. With a fair appetite.  Pt. With electrolytes replaced today.  Will continue to monitor pt.

## 2020-02-24 LAB
ABO + RH BLD: NORMAL
ALBUMIN SERPL BCP-MCNC: 3.1 G/DL (ref 3.5–5.2)
ALP SERPL-CCNC: 46 U/L (ref 55–135)
ALT SERPL W/O P-5'-P-CCNC: 8 U/L (ref 10–44)
ANION GAP SERPL CALC-SCNC: 7 MMOL/L (ref 8–16)
ANISOCYTOSIS BLD QL SMEAR: SLIGHT
AST SERPL-CCNC: 10 U/L (ref 10–40)
BASOPHILS NFR BLD: 0 % (ref 0–1.9)
BILIRUB SERPL-MCNC: 0.5 MG/DL (ref 0.1–1)
BLD GP AB SCN CELLS X3 SERPL QL: NORMAL
BUN SERPL-MCNC: 4 MG/DL (ref 8–23)
CALCIUM SERPL-MCNC: 7.7 MG/DL (ref 8.7–10.5)
CHLORIDE SERPL-SCNC: 110 MMOL/L (ref 95–110)
CO2 SERPL-SCNC: 25 MMOL/L (ref 23–29)
CREAT SERPL-MCNC: 0.7 MG/DL (ref 0.5–1.4)
DIFFERENTIAL METHOD: ABNORMAL
DOHLE BOD BLD QL SMEAR: PRESENT
EOSINOPHIL NFR BLD: 1 % (ref 0–8)
ERYTHROCYTE [DISTWIDTH] IN BLOOD BY AUTOMATED COUNT: 18.2 % (ref 11.5–14.5)
EST. GFR  (AFRICAN AMERICAN): >60 ML/MIN/1.73 M^2
EST. GFR  (NON AFRICAN AMERICAN): >60 ML/MIN/1.73 M^2
GLUCOSE SERPL-MCNC: 75 MG/DL (ref 70–110)
HCT VFR BLD AUTO: 25.4 % (ref 37–48.5)
HGB BLD-MCNC: 7.8 G/DL (ref 12–16)
IMM GRANULOCYTES # BLD AUTO: ABNORMAL K/UL (ref 0–0.04)
IMM GRANULOCYTES NFR BLD AUTO: ABNORMAL % (ref 0–0.5)
LYMPHOCYTES NFR BLD: 22 % (ref 18–48)
MAGNESIUM SERPL-MCNC: 1.6 MG/DL (ref 1.6–2.6)
MCH RBC QN AUTO: 25.7 PG (ref 27–31)
MCHC RBC AUTO-ENTMCNC: 30.7 G/DL (ref 32–36)
MCV RBC AUTO: 84 FL (ref 82–98)
MONOCYTES NFR BLD: 16 % (ref 4–15)
NEUTROPHILS NFR BLD: 56 % (ref 38–73)
NEUTS BAND NFR BLD MANUAL: 5 %
NRBC BLD-RTO: 0 /100 WBC
OVALOCYTES BLD QL SMEAR: ABNORMAL
PHOSPHATE SERPL-MCNC: 2.5 MG/DL (ref 2.7–4.5)
PLATELET # BLD AUTO: 16 K/UL (ref 150–350)
PLATELET BLD QL SMEAR: ABNORMAL
PMV BLD AUTO: ABNORMAL FL (ref 9.2–12.9)
POIKILOCYTOSIS BLD QL SMEAR: SLIGHT
POLYCHROMASIA BLD QL SMEAR: ABNORMAL
POTASSIUM SERPL-SCNC: 3.7 MMOL/L (ref 3.5–5.1)
PROT SERPL-MCNC: 5.5 G/DL (ref 6–8.4)
RBC # BLD AUTO: 3.03 M/UL (ref 4–5.4)
SODIUM SERPL-SCNC: 142 MMOL/L (ref 136–145)
TOXIC GRANULES BLD QL SMEAR: PRESENT
WBC # BLD AUTO: 0.38 K/UL (ref 3.9–12.7)

## 2020-02-24 PROCEDURE — 63600175 PHARM REV CODE 636 W HCPCS: Mod: JG,HCNC | Performed by: INTERNAL MEDICINE

## 2020-02-24 PROCEDURE — 63600175 PHARM REV CODE 636 W HCPCS: Mod: HCNC | Performed by: INTERNAL MEDICINE

## 2020-02-24 PROCEDURE — 94761 N-INVAS EAR/PLS OXIMETRY MLT: CPT | Mod: HCNC

## 2020-02-24 PROCEDURE — 86850 RBC ANTIBODY SCREEN: CPT | Mod: HCNC

## 2020-02-24 PROCEDURE — 99900035 HC TECH TIME PER 15 MIN (STAT): Mod: HCNC

## 2020-02-24 PROCEDURE — 25000003 PHARM REV CODE 250: Mod: HCNC | Performed by: INTERNAL MEDICINE

## 2020-02-24 PROCEDURE — 85027 COMPLETE CBC AUTOMATED: CPT | Mod: HCNC

## 2020-02-24 PROCEDURE — 97530 THERAPEUTIC ACTIVITIES: CPT | Mod: HCNC

## 2020-02-24 PROCEDURE — 99233 PR SUBSEQUENT HOSPITAL CARE,LEVL III: ICD-10-PCS | Mod: HCNC,,, | Performed by: INTERNAL MEDICINE

## 2020-02-24 PROCEDURE — 83735 ASSAY OF MAGNESIUM: CPT | Mod: HCNC

## 2020-02-24 PROCEDURE — 80053 COMPREHEN METABOLIC PANEL: CPT | Mod: HCNC

## 2020-02-24 PROCEDURE — 25000003 PHARM REV CODE 250: Mod: HCNC | Performed by: STUDENT IN AN ORGANIZED HEALTH CARE EDUCATION/TRAINING PROGRAM

## 2020-02-24 PROCEDURE — 25000003 PHARM REV CODE 250: Mod: HCNC | Performed by: NURSE PRACTITIONER

## 2020-02-24 PROCEDURE — 63600175 PHARM REV CODE 636 W HCPCS: Mod: HCNC | Performed by: NURSE PRACTITIONER

## 2020-02-24 PROCEDURE — 84100 ASSAY OF PHOSPHORUS: CPT | Mod: HCNC

## 2020-02-24 PROCEDURE — A9155 ARTIFICIAL SALIVA: HCPCS | Mod: HCNC | Performed by: INTERNAL MEDICINE

## 2020-02-24 PROCEDURE — 85007 BL SMEAR W/DIFF WBC COUNT: CPT | Mod: HCNC

## 2020-02-24 PROCEDURE — 99233 SBSQ HOSP IP/OBS HIGH 50: CPT | Mod: HCNC,,, | Performed by: INTERNAL MEDICINE

## 2020-02-24 PROCEDURE — 20600001 HC STEP DOWN PRIVATE ROOM: Mod: HCNC

## 2020-02-24 RX ORDER — ONDANSETRON 2 MG/ML
8 INJECTION INTRAMUSCULAR; INTRAVENOUS EVERY 8 HOURS PRN
Status: DISCONTINUED | OUTPATIENT
Start: 2020-02-24 | End: 2020-02-26 | Stop reason: HOSPADM

## 2020-02-24 RX ADMIN — Medication 400 MG: at 02:02

## 2020-02-24 RX ADMIN — POTASSIUM & SODIUM PHOSPHATES POWDER PACK 280-160-250 MG 1 PACKET: 280-160-250 PACK at 05:02

## 2020-02-24 RX ADMIN — GABAPENTIN 600 MG: 300 CAPSULE ORAL at 08:02

## 2020-02-24 RX ADMIN — OYSTER SHELL CALCIUM WITH VITAMIN D 1 TABLET: 500; 200 TABLET, FILM COATED ORAL at 09:02

## 2020-02-24 RX ADMIN — Medication 30 ML: at 12:02

## 2020-02-24 RX ADMIN — LEVOFLOXACIN 500 MG: 500 TABLET, FILM COATED ORAL at 09:02

## 2020-02-24 RX ADMIN — POTASSIUM & SODIUM PHOSPHATES POWDER PACK 280-160-250 MG 1 PACKET: 280-160-250 PACK at 02:02

## 2020-02-24 RX ADMIN — Medication 30 ML: at 09:02

## 2020-02-24 RX ADMIN — FILGRASTIM 300 MCG: 480 INJECTION, SOLUTION INTRAVENOUS; SUBCUTANEOUS at 09:02

## 2020-02-24 RX ADMIN — AMLODIPINE BESYLATE 5 MG: 5 TABLET ORAL at 09:02

## 2020-02-24 RX ADMIN — Medication 400 MG: at 06:02

## 2020-02-24 RX ADMIN — GABAPENTIN 300 MG: 300 CAPSULE ORAL at 09:02

## 2020-02-24 RX ADMIN — POTASSIUM & SODIUM PHOSPHATES POWDER PACK 280-160-250 MG 1 PACKET: 280-160-250 PACK at 09:02

## 2020-02-24 RX ADMIN — OYSTER SHELL CALCIUM WITH VITAMIN D 1 TABLET: 500; 200 TABLET, FILM COATED ORAL at 08:02

## 2020-02-24 RX ADMIN — ACYCLOVIR 800 MG: 200 CAPSULE ORAL at 09:02

## 2020-02-24 RX ADMIN — POTASSIUM & SODIUM PHOSPHATES POWDER PACK 280-160-250 MG 1 PACKET: 280-160-250 PACK at 06:02

## 2020-02-24 RX ADMIN — FLUCONAZOLE 400 MG: 200 TABLET ORAL at 09:02

## 2020-02-24 RX ADMIN — POTASSIUM CHLORIDE 20 MEQ: 1500 TABLET, EXTENDED RELEASE ORAL at 06:02

## 2020-02-24 RX ADMIN — ACYCLOVIR 800 MG: 200 CAPSULE ORAL at 08:02

## 2020-02-24 RX ADMIN — Medication 30 ML: at 05:02

## 2020-02-24 RX ADMIN — LOPERAMIDE HYDROCHLORIDE 2 MG: 2 CAPSULE ORAL at 05:02

## 2020-02-24 RX ADMIN — PANTOPRAZOLE SODIUM 40 MG: 40 TABLET, DELAYED RELEASE ORAL at 09:02

## 2020-02-24 RX ADMIN — SERTRALINE HYDROCHLORIDE 50 MG: 50 TABLET ORAL at 09:02

## 2020-02-24 RX ADMIN — Medication 30 ML: at 08:02

## 2020-02-24 RX ADMIN — SODIUM CHLORIDE: 0.9 INJECTION, SOLUTION INTRAVENOUS at 11:02

## 2020-02-24 RX ADMIN — Medication 400 MG: at 09:02

## 2020-02-24 NOTE — SUBJECTIVE & OBJECTIVE
Subjective:     Interval History: Day +12 Verenice AutoSCT for MM. ANC pending. She had diarrhea yesterday x2 yesterday.  Nausea controlled and no vomiting now. Feeling better today. She is working with PT 2x week.     Objective:     Vital Signs (Most Recent):  Temp: 98.5 °F (36.9 °C) (02/24/20 0810)  Pulse: 69 (02/24/20 0810)  Resp: 17 (02/24/20 0810)  BP: 120/78 (02/24/20 0810)  SpO2: 95 % (02/24/20 0810) Vital Signs (24h Range):  Temp:  [97.8 °F (36.6 °C)-98.6 °F (37 °C)] 98.5 °F (36.9 °C)  Pulse:  [60-72] 69  Resp:  [16-20] 17  SpO2:  [95 %-98 %] 95 %  BP: ()/(54-78) 120/78     Weight: 71.5 kg (157 lb 10.1 oz)  Body mass index is 27.92 kg/m².  Body surface area is 1.78 meters squared.    ECOG SCORE         [unfilled]    Intake/Output - Last 3 Shifts       02/22 0700 - 02/23 0659 02/23 0700 - 02/24 0659 02/24 0700 - 02/25 0659    P.O. 1420 740     I.V. (mL/kg) 2103 (29.2) 2214.7 (31)     Blood  640     Total Intake(mL/kg) 3523 (48.9) 3594.7 (50.3)     Urine (mL/kg/hr) 2050 (1.2) 2700 (1.6)     Stool 0 0     Total Output 2050 2700     Net +1473 +894.7            Urine Occurrence 1 x 1 x     Stool Occurrence 2 x 2 x           Physical Exam   Constitutional: She is oriented to person, place, and time. Vital signs are normal. She appears well-developed and well-nourished. She is cooperative.   HENT:   Head: Normocephalic.   Eyes: Pupils are equal, round, and reactive to light. Conjunctivae, EOM and lids are normal.   Neck: Trachea normal and normal range of motion.   Cardiovascular: Normal rate, regular rhythm, S1 normal, S2 normal and normal heart sounds.   Pulmonary/Chest: Effort normal and breath sounds normal.   Abdominal: Soft. Normal appearance and bowel sounds are normal.   Musculoskeletal: Normal range of motion.   Chronic tremor to RUE 2/2 stroke   Neurological: She is alert and oriented to person, place, and time. Coordination and gait normal.   Skin: Skin is dry and intact. She is not diaphoretic. No  pallor.   vascath to chest wall c/d/i   Psychiatric: She has a normal mood and affect. Her speech is normal and behavior is normal. Judgment and thought content normal. Cognition and memory are normal.       Significant Labs:   CBC:   Recent Labs   Lab 02/23/20  0356 02/24/20 0426   WBC 0.06* 0.38*   HGB 6.8* 7.8*   HCT 21.8* 25.4*   PLT 3*  --    , CMP:   Recent Labs   Lab 02/23/20  0356 02/24/20 0426    142   K 3.5 3.7   * 110   CO2 23 25   GLU 80 75   BUN 5* 4*   CREATININE 0.7 0.7   CALCIUM 7.6* 7.7*   PROT 5.4* 5.5*   ALBUMIN 3.0* 3.1*   BILITOT 0.4 0.5   ALKPHOS 46* 46*   AST 10 10   ALT 9* 8*   ANIONGAP 6* 7*   EGFRNONAA >60.0 >60.0   , Haptoglobin: No results for input(s): HAPTOGLOBIN in the last 48 hours., LDH: No results for input(s): LDHCSF, BFSOURCE in the last 48 hours., Reticulocytes: No results for input(s): RETIC in the last 48 hours., Uric Acid No results for input(s): URICACID in the last 48 hours. and All pertinent labs from the last 24 hours have been reviewed.    Diagnostic Results:  I have reviewed all pertinent imaging results/findings within the past 24 hours.

## 2020-02-24 NOTE — ASSESSMENT & PLAN NOTE
-Today is day +12  -received Melphalan 140mg/m2 without difficulty  -stem cell infusion was on 2/12 without issue, received 3 bags (2.85 x 10^6) CD 34 stem cells  -some expected fatigue, poor appetite, not watery diarrhea, and nausea which is improved with scheduled zofran and PRN Compazine    Regimen as below:  Transplant Day -2: IV fluids  Transplant Day -1 through Day 0: Antiemetics   Transplant Day -1: Melphalan   Transplant Day 0: Stem Cell Infusion   Transplant Day +7: Growth factor begins

## 2020-02-24 NOTE — PLAN OF CARE
[facility]    HOME HEALTH ORDERS  FACE TO FACE ENCOUNTER    Patient Name: Shelby Thompson  YOB: 1950    PCP: Emanuel Arevalo MD   PCP Address: [address] / [place] [address]  PCP Phone Number: [phone]  PCP Fax: [phone]    Encounter Date: 02/24/2020    Admit to Home Health    Diagnoses:  Active Hospital Problems    Diagnosis  POA    *Status post autologous bone marrow transplant [Z94.81]  Not Applicable    Diarrhea [R19.7]  No    Chemotherapy-induced nausea [R11.0, T45.1X5A]  No    Pancytopenia due to antineoplastic chemotherapy [D61.810, T45.1X5A]  Yes    Drug-induced polyneuropathy [G62.0]  Yes    Essential hypertension [I10]  Yes    Type 2 diabetes mellitus without complication, without long-term current use of insulin [E11.9]  Yes    Interstitial lung disease [J84.9]  Yes    Multiple myeloma in remission [C90.01]  Yes    Recurrent major depressive disorder, in full remission [F33.42]  Yes    Liver mass [R16.0]  Yes    History of CVA with residual deficit [I69.30]  Not Applicable    Hyperlipemia [E78.5]  Yes    Personal history of malignant neoplasm of breast [Z85.3]  Not Applicable      Resolved Hospital Problems   No resolved problems to display.       Future Appointments   Date Time Provider Department Center   3/26/2020  7:45 AM [facility]           I have seen and examined this patient face to face today. My clinical findings that support the need for the home health skilled services and home bound status are the following:  Weakness/numbness causing balance and gait disturbance due to Weakness/Debility and Malignancy/Cancer making it taxing to leave home.  Requiring assistive device to leave home due to unsteady gait caused by  Weakness/Debility and Malignancy/Cancer.    Allergies:  Review of patient's allergies indicates:   Allergen Reactions    Lipitor [atorvastatin]      Itching, elevated cpk, muscle aches,  statin induced myositis       Diet: regular diet    Activities: activity as tolerated    Nursing:   SN to complete comprehensive assessment including routine vital signs. Instruct on disease process and s/s of complications to report to MD. Review/verify medication list sent home with the patient at time of discharge  and instruct patient/caregiver as needed. Frequency may be adjusted depending on start of care date.    Notify MD if SBP > 160 or < 90; DBP > 90 or < 50; HR > 120 or < 50; Temp > 101; Other:   \      CONSULTS:    Physical Therapy to evaluate and treat. Evaluate for home safety and equipment needs; Establish/upgrade home exercise program. Perform / instruct on therapeutic exercises, gait training, transfer training, and Range of Motion.  Occupational Therapy to evaluate and treat. Evaluate home environment for safety and equipment needs. Perform/Instruct on transfers, ADL training, ROM, and therapeutic exercises.  Aide to provide assistance with personal care, ADLs, and vital signs.    MISCELLANEOUS CARE:  N/A    WOUND CARE ORDERS  n/a      Medications: Review discharge medications with patient and family and provide education.      Current Discharge Medication List      START taking these medications    Details   evolocumab (REPATHA SURECLICK) 140 mg/mL PnIj Inject 1 ml into the skin every 2 wks  Qty: 6 mL, Refills: 3         CONTINUE these medications which have NOT CHANGED    Details   acyclovir (ZOVIRAX) 400 MG tablet Take 1 tablet (400 mg total) by mouth 2 (two) times daily.  Qty: 120 tablet, Refills: 2    Associated Diagnoses: Multiple myeloma not having achieved remission      amLODIPine (NORVASC) 5 MG tablet TAKE 1 TABLET BY MOUTH EVERY DAY  Qty: 90 tablet, Refills: 3      ferrous gluconate 324 mg (37.5 mg iron) Tab TAKE 1 TABLET BY MOUTH 2 (TWO) TIMES DAILY WITH MEALS.  Qty: 180 tablet, Refills: 0      gabapentin (NEURONTIN) 300 MG capsule TAKE 1 CAPSULE (300 MG TOTAL) BY MOUTH 2 (TWO) TIMES  DAILY.  Qty: 180 capsule, Refills: 3      omeprazole (PRILOSEC) 40 MG capsule TAKE 1 CAPSULE BY MOUTH EVERY DAY  Qty: 90 capsule, Refills: 3      bisacodyl (DULCOLAX) 5 mg EC tablet Take 5 mg by mouth once daily.      loratadine (CLARITIN) 10 mg tablet Take 10 mg by mouth nightly.      ondansetron (ZOFRAN) 4 MG tablet Take 1 tablet (4 mg total) by mouth every 6 (six) hours as needed for Nausea.  Qty: 30 tablet, Refills: 1    Associated Diagnoses: Multiple myeloma not having achieved remission      sertraline (ZOLOFT) 50 MG tablet TAKE 1 TABLET (50 MG TOTAL) BY MOUTH ONCE DAILY.  Qty: 90 tablet, Refills: 3      traMADol (ULTRAM) 50 mg tablet Take 1 tablet (50 mg total) by mouth 2 (two) times daily as needed for Pain.  Qty: 60 tablet, Refills: 3      vitamin D 1000 units Tab Take 185 mg by mouth nightly.          STOP taking these medications       acetaminophen (TYLENOL) 650 MG TbSR Comments:   Reason for Stopping:         alirocumab (PRALUENT PEN) 75 mg/mL PnIj Comments:   Reason for Stopping:         allopurinol (ZYLOPRIM) 300 MG tablet Comments:   Reason for Stopping:         aspirin 81 mg Tab Comments:   Reason for Stopping:         GAVILYTE-G 236-22.74-6.74 -5.86 gram suspension Comments:   Reason for Stopping:         HYDROcodone-acetaminophen (NORCO) 5-325 mg per tablet Comments:   Reason for Stopping:         ondansetron (ZOFRAN-ODT) 8 MG TbDL Comments:   Reason for Stopping:         REVLIMID 25 mg Cap Comments:   Reason for Stopping:               I certify that this patient is confined to her home and needs intermittent skilled nursing care, physical therapy and occupational therapy.    Genny Napoles MD  Hematology/Oncology Fellow, PGY V  [facility]

## 2020-02-24 NOTE — PLAN OF CARE
"Day +12 Auto PBSCT. Patient reports "feeling better" states she had not had an nausea episode today. Shower/linens today. Patient hair is falling out. Patient remains neutropenic, thrombocytopenic precautions. Patient up to chair and frequently walking in the room. No falls, afebrile, will cont to monitor.  "

## 2020-02-24 NOTE — PLAN OF CARE
Problem: Occupational Therapy Goal  Goal: Occupational Therapy Goal  Description  Goals to be met by: 3/2    Patient will increase functional independence with ADLs by performing:    Feeding with [place].  UE Dressing with [place].  LE Dressing with [place].  Grooming while seated with [place].  Toileting from toilet with [place] for hygiene and clothing management.      Outcome: Ongoing, Progressing    I certify that I was present in the room directing the student in service delivery and guiding them using my skilled judgment. As the co-signing therapist I have reviewed the students documentation and am responsible for the treatment, assessment, and plan.

## 2020-02-24 NOTE — PLAN OF CARE
Patient remained free from falls throughout shift, call bell within reach. Day +12 following her Auto SCT. No complaints of diarrhea or nausea. Appetite improving. Vitals stable, will continue to monitor.

## 2020-02-24 NOTE — PROGRESS NOTES
[facility]  Hematology  Bone Marrow Transplant  Progress Note    Patient Name: Shelby Thompson  Admission Date: 2/10/2020  Hospital Length of Stay: 14 days  Code Status: Full Code    Subjective:     Interval History: Day +12 Verenice AutoSCT for MM. ANC pending. She had diarrhea yesterday x2 yesterday.  Nausea controlled and no vomiting now. Feeling better today. She is working with PT 2x week.     Objective:     Vital Signs (Most Recent):  Temp: 98.5 °F (36.9 °C) (02/24/20 0810)  Pulse: 69 (02/24/20 0810)  Resp: 17 (02/24/20 0810)  BP: 120/78 (02/24/20 0810)  SpO2: 95 % (02/24/20 0810) Vital Signs (24h Range):  Temp:  [97.8 °F (36.6 °C)-98.6 °F (37 °C)] 98.5 °F (36.9 °C)  Pulse:  [60-72] 69  Resp:  [16-20] 17  SpO2:  [95 %-98 %] 95 %  BP: ()/(54-78) 120/78     Weight: 71.5 kg (157 lb 10.1 oz)  Body mass index is 27.92 kg/m².  Body surface area is 1.78 meters squared.    ECOG SCORE         [unfilled]    Intake/Output - Last 3 Shifts       02/22 0700 - 02/23 0659 02/23 0700 - 02/24 0659 02/24 0700 - 02/25 0659    P.O. 1420 740     I.V. (mL/kg) 2103 (29.2) 2214.7 (31)     Blood  640     Total Intake(mL/kg) 3523 (48.9) 3594.7 (50.3)     Urine (mL/kg/hr) 2050 (1.2) 2700 (1.6)     Stool 0 0     Total Output 2050 2700     Net +1473 +894.7            Urine Occurrence 1 x 1 x     Stool Occurrence 2 x 2 x           Physical Exam   Constitutional: She is oriented to person, place, and time. Vital signs are normal. She appears well-developed and well-nourished. She is cooperative.   HENT:   Head: Normocephalic.   Eyes: Pupils are equal, round, and reactive to light. Conjunctivae, EOM and lids are normal.   Neck: Trachea normal and normal range of motion.   Cardiovascular: Normal rate, regular rhythm, S1 normal, S2 normal and normal heart sounds.   Pulmonary/Chest: Effort normal and breath sounds normal.   Abdominal: Soft. Normal appearance and bowel sounds are normal.   Musculoskeletal: Normal range of  motion.   Chronic tremor to RUE 2/2 stroke   Neurological: She is alert and oriented to person, place, and time. Coordination and gait normal.   Skin: Skin is dry and intact. She is not diaphoretic. No pallor.   vascath to chest wall c/d/i   Psychiatric: She has a normal mood and affect. Her speech is normal and behavior is normal. Judgment and thought content normal. Cognition and memory are normal.       Significant Labs:   CBC:   Recent Labs   Lab 02/23/20  0356 02/24/20  0426   WBC 0.06* 0.38*   HGB 6.8* 7.8*   HCT 21.8* 25.4*   PLT 3*  --    , CMP:   Recent Labs   Lab 02/23/20  0356 02/24/20  0426    142   K 3.5 3.7   * 110   CO2 23 25   GLU 80 75   BUN 5* 4*   CREATININE 0.7 0.7   CALCIUM 7.6* 7.7*   PROT 5.4* 5.5*   ALBUMIN 3.0* 3.1*   BILITOT 0.4 0.5   ALKPHOS 46* 46*   AST 10 10   ALT 9* 8*   ANIONGAP 6* 7*   EGFRNONAA >60.0 >60.0   , Haptoglobin: No results for input(s): HAPTOGLOBIN in the last 48 hours., LDH: No results for input(s): LDHCSF, BFSOURCE in the last 48 hours., Reticulocytes: No results for input(s): RETIC in the last 48 hours., Uric Acid No results for input(s): URICACID in the last 48 hours. and All pertinent labs from the last 24 hours have been reviewed.    Diagnostic Results:  I have reviewed all pertinent imaging results/findings within the past 24 hours.    Assessment/Plan:     * Status post autologous bone marrow transplant  -Today is day +12  -received Melphalan 140mg/m2 without difficulty  -stem cell infusion was on 2/12 without issue, received 3 bags (2.85 x 10^6) CD 34 stem cells  -some expected fatigue, poor appetite, not watery diarrhea, and nausea which is improved with scheduled zofran and PRN Compazine    Regimen as below:  Transplant Day -2: IV fluids  Transplant Day -1 through Day 0: Antiemetics   Transplant Day -1: Melphalan   Transplant Day 0: Stem Cell Infusion   Transplant Day +7: Growth factor begins    Diarrhea  - C diff neg   - on immodium PRN      Chemotherapy-induced nausea  - continue scheduled Zofran  - PRN compazine and Ativan    Type 2 diabetes mellitus without complication, without long-term current use of insulin  -monitor closely while inpatient; not on active medication     Essential hypertension  -continue norvasc       Drug-induced polyneuropathy  -pt has had numbness/tingling to hands and BUE tremors post CVA, for which she is on gabapentin 600 mg BID  - prn tramadol     Pancytopenia due to antineoplastic chemotherapy  -hx of JESSE; received single dose feraheme pre transplant  -colonoscopy okay for transplant  -will monitor daily CBC inpatient  -transfuse for Hgb <7, Plt <10K or bleeding      Interstitial lung disease  - had abnormal PFTs during transplant evaluation 10/2019; seen by pulm; no significant desaturation on 6MWT; okay per transplant per pulm     Multiple myeloma in remission  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) High Risk disease; complex karyotype by CG; , t(14;20), monosomy 13 on FISH  -She has achieved and tolerated well VRd x completing 7, 28 day cycles and achieving stringent CR as of Dec 2019 marrow/biochemical studies   -adequate response to proceed with autoSCT  -she has PS 1-2 and multiple abnormalities in her pre transplant eval that have been cleared by pulmonology, cardiology, gynecology and are not prohibitive to proceed with transplant  -she has JESSE - sp  one dose of iv feraheme with response  will need reassessment by GI; if colonosocpy normal  proceed with transplant   -no further velcade maintenance pre transplant, last dose was 12/24/19  -plan for reduced dose clifton 140mg/m2 in light of age    Recurrent major depressive disorder, in full remission  -continue zoloft     Liver mass  -vascular per recent MRI in 09/2019 with no changes; seen by hepatology pre-transplant and signed off; recommend repeat MRI in 6 months     History of CVA with residual deficit  -one  in 2008, one in 2011  -has been cleared by cardiology for transplant     Hyperlipemia  -on praluent, PCSK9 inhibitor; holding during admission     Personal history of malignant neoplasm of breast  L breast cancer DCIS stage 0  -s/p lumpectomy and radiation, no endocrine tx due to CVA         VTE Risk Mitigation (From admission, onward)         Ordered     heparin (porcine) injection 1,600 Units  As needed (PRN)      02/10/20 2141     IP VTE HIGH RISK PATIENT  Once      02/10/20 1738                Discussed with Dr. Whitney Napoles MD  Bone Marrow Transplant, PGY V  [facility]

## 2020-02-24 NOTE — PROGRESS NOTES
Spoke to patient who expressed no preference for HH agency.  Sent Home Health referrals via [facility].  Declined by [facility] and [facility].  Accepted by [facility] at [facility].  Will follow.

## 2020-02-24 NOTE — PT/OT/SLP PROGRESS
Occupational Therapy   Treatment    Name: Shelby Thompson  MRN: [id number]  Admitting Diagnosis:  Status post autologous bone marrow transplant       Recommendations:     Discharge Recommendations: home with home health  Discharge Equipment Recommendations:  none  Barriers to discharge:  Decreased caregiver support    Assessment:     Shelby Thompson is a 69 y.o. female with a medical diagnosis of Status post autologous bone marrow transplant.  Performance deficits affecting function are weakness, impaired endurance, impaired self care skills, impaired functional mobilty, gait instability, impaired balance. Upon OT entry, pt pleasant but w/ reports of fatigue limiting her functional performance. Pt verbalized understanding when educated on importance of OOB activity/mobility and home management/safety. Pt demonstrates ability to carry out self-care tasks and will continue to benefit from skilled OT intervention to increase strength, endurance and balance for independence in all ADLs, functional mobility and functional transfers.     Rehab Prognosis:  Good; patient would benefit from acute skilled OT services to address these deficits and reach maximum level of function.       Plan:     Patient to be seen 1 x/week to address the above listed problems via self-care/home management, therapeutic activities, therapeutic exercises  · Plan of Care Expires: 03/11/20  · Plan of Care Reviewed with: patient    Subjective     Pain/Comfort:  Pain Rating 1: 0/10  Pain Rating Post-Intervention 1: 0/10    Objective:     Communicated with: nurse prior to session.  Patient found seated at EOB with peripheral IV upon OT entry to room.    General Precautions: Standard, fall   Orthopedic Precautions:N/A   Braces: N/A     Occupational Performance:     Bed Mobility:    Pt presented edge of bed and continues to demonstrate indep bed mobility.  Pt w/ complaints of fatigue; tx focused on extensive education       Activities of Daily  Living:  Pt continues to demonstrate safe and effective self care performance     [facility] 6 Click ADL: 24    Treatment & Education:  Pt educated on safety, role of OT, importance of increased participation in self care for gains , expectations for participation, expectations for gains, POC, energy conservation, caregiver strain. White board updated.   -Pt educated on importance of OOB activities/mobility to reduce pain and increase strength for ensuring functional performance.   -Pt educated on importance of energy conservation to ensure safety.   -Pt educated on importance of positioning RW near bedside to reduce risk of falling.     Patient left seated at EOB with call button in reachEducation:      GOALS:   Multidisciplinary Problems     Occupational Therapy Goals        Problem: Occupational Therapy Goal    Goal Priority Disciplines Outcome Interventions   Occupational Therapy Goal     OT, PT/OT Ongoing, Progressing    Description:  Goals to be met by: 3/2    Patient will increase functional independence with ADLs by performing:    Feeding with [place].  UE Dressing with [place].  LE Dressing with [place].  Grooming while seated with [place].  Toileting from toilet with [place] for hygiene and clothing management.                       Time Tracking:     OT Date of Treatment: 02/24/20  OT Start Time: 0955  OT Stop Time: 1020  OT Total Time (min): 25 min    Billable Minutes:Therapeutic Activity 25    VEENA Torres  2/24/2020    I certify that I was present in the room directing the student in service delivery and guiding them using my skilled judgment. As the co-signing therapist I have reviewed the students documentation and am responsible for the treatment, assessment, and plan.   Chyna Saldivar, OT  2/24/2020

## 2020-02-25 LAB
ALBUMIN SERPL BCP-MCNC: 3.2 G/DL (ref 3.5–5.2)
ALP SERPL-CCNC: 52 U/L (ref 55–135)
ALT SERPL W/O P-5'-P-CCNC: 7 U/L (ref 10–44)
ANION GAP SERPL CALC-SCNC: 6 MMOL/L (ref 8–16)
ANISOCYTOSIS BLD QL SMEAR: SLIGHT
AST SERPL-CCNC: 10 U/L (ref 10–40)
BASOPHILS # BLD AUTO: 0.03 K/UL (ref 0–0.2)
BASOPHILS NFR BLD: 1.6 % (ref 0–1.9)
BILIRUB SERPL-MCNC: 0.5 MG/DL (ref 0.1–1)
BUN SERPL-MCNC: 3 MG/DL (ref 8–23)
CALCIUM SERPL-MCNC: 7.7 MG/DL (ref 8.7–10.5)
CHLORIDE SERPL-SCNC: 109 MMOL/L (ref 95–110)
CO2 SERPL-SCNC: 26 MMOL/L (ref 23–29)
CREAT SERPL-MCNC: 0.7 MG/DL (ref 0.5–1.4)
DIFFERENTIAL METHOD: ABNORMAL
DOHLE BOD BLD QL SMEAR: PRESENT
EOSINOPHIL # BLD AUTO: 0 K/UL (ref 0–0.5)
EOSINOPHIL NFR BLD: 0 % (ref 0–8)
ERYTHROCYTE [DISTWIDTH] IN BLOOD BY AUTOMATED COUNT: 18.5 % (ref 11.5–14.5)
EST. GFR  (AFRICAN AMERICAN): >60 ML/MIN/1.73 M^2
EST. GFR  (NON AFRICAN AMERICAN): >60 ML/MIN/1.73 M^2
GLUCOSE SERPL-MCNC: 78 MG/DL (ref 70–110)
HCT VFR BLD AUTO: 27.7 % (ref 37–48.5)
HGB BLD-MCNC: 8.4 G/DL (ref 12–16)
HYPOCHROMIA BLD QL SMEAR: ABNORMAL
IMM GRANULOCYTES # BLD AUTO: 0.02 K/UL (ref 0–0.04)
IMM GRANULOCYTES NFR BLD AUTO: 1.1 % (ref 0–0.5)
LYMPHOCYTES # BLD AUTO: 0.1 K/UL (ref 1–4.8)
LYMPHOCYTES NFR BLD: 6.4 % (ref 18–48)
MAGNESIUM SERPL-MCNC: 1.5 MG/DL (ref 1.6–2.6)
MCH RBC QN AUTO: 25.6 PG (ref 27–31)
MCHC RBC AUTO-ENTMCNC: 30.3 G/DL (ref 32–36)
MCV RBC AUTO: 85 FL (ref 82–98)
MONOCYTES # BLD AUTO: 0.3 K/UL (ref 0.3–1)
MONOCYTES NFR BLD: 13.3 % (ref 4–15)
NEUTROPHILS # BLD AUTO: 1.5 K/UL (ref 1.8–7.7)
NEUTROPHILS NFR BLD: 77.6 % (ref 38–73)
NRBC BLD-RTO: 0 /100 WBC
OVALOCYTES BLD QL SMEAR: ABNORMAL
PHOSPHATE SERPL-MCNC: 2.5 MG/DL (ref 2.7–4.5)
PLATELET # BLD AUTO: 13 K/UL (ref 150–350)
PLATELET BLD QL SMEAR: ABNORMAL
PMV BLD AUTO: ABNORMAL FL (ref 9.2–12.9)
POIKILOCYTOSIS BLD QL SMEAR: SLIGHT
POTASSIUM SERPL-SCNC: 3.6 MMOL/L (ref 3.5–5.1)
PROT SERPL-MCNC: 5.7 G/DL (ref 6–8.4)
RBC # BLD AUTO: 3.28 M/UL (ref 4–5.4)
SODIUM SERPL-SCNC: 141 MMOL/L (ref 136–145)
TOXIC GRANULES BLD QL SMEAR: PRESENT
WBC # BLD AUTO: 1.88 K/UL (ref 3.9–12.7)

## 2020-02-25 PROCEDURE — 99233 PR SUBSEQUENT HOSPITAL CARE,LEVL III: ICD-10-PCS | Mod: HCNC,,, | Performed by: INTERNAL MEDICINE

## 2020-02-25 PROCEDURE — 85025 COMPLETE CBC W/AUTO DIFF WBC: CPT | Mod: HCNC

## 2020-02-25 PROCEDURE — 25000003 PHARM REV CODE 250: Mod: HCNC | Performed by: NURSE PRACTITIONER

## 2020-02-25 PROCEDURE — 97803 MED NUTRITION INDIV SUBSEQ: CPT | Mod: HCNC

## 2020-02-25 PROCEDURE — 63600175 PHARM REV CODE 636 W HCPCS: Mod: HCNC | Performed by: INTERNAL MEDICINE

## 2020-02-25 PROCEDURE — 63600175 PHARM REV CODE 636 W HCPCS: Mod: JG,HCNC | Performed by: INTERNAL MEDICINE

## 2020-02-25 PROCEDURE — 84100 ASSAY OF PHOSPHORUS: CPT | Mod: HCNC

## 2020-02-25 PROCEDURE — 80053 COMPREHEN METABOLIC PANEL: CPT | Mod: HCNC

## 2020-02-25 PROCEDURE — 97116 GAIT TRAINING THERAPY: CPT | Mod: HCNC

## 2020-02-25 PROCEDURE — 63600175 PHARM REV CODE 636 W HCPCS: Mod: HCNC | Performed by: NURSE PRACTITIONER

## 2020-02-25 PROCEDURE — 99233 SBSQ HOSP IP/OBS HIGH 50: CPT | Mod: HCNC,,, | Performed by: INTERNAL MEDICINE

## 2020-02-25 PROCEDURE — 97112 NEUROMUSCULAR REEDUCATION: CPT | Mod: HCNC

## 2020-02-25 PROCEDURE — 20600001 HC STEP DOWN PRIVATE ROOM: Mod: HCNC

## 2020-02-25 PROCEDURE — A9155 ARTIFICIAL SALIVA: HCPCS | Mod: HCNC | Performed by: INTERNAL MEDICINE

## 2020-02-25 PROCEDURE — 25000003 PHARM REV CODE 250: Mod: HCNC | Performed by: INTERNAL MEDICINE

## 2020-02-25 PROCEDURE — 83735 ASSAY OF MAGNESIUM: CPT | Mod: HCNC

## 2020-02-25 RX ADMIN — Medication 30 ML: at 08:02

## 2020-02-25 RX ADMIN — FILGRASTIM 300 MCG: 480 INJECTION, SOLUTION INTRAVENOUS; SUBCUTANEOUS at 08:02

## 2020-02-25 RX ADMIN — GABAPENTIN 300 MG: 300 CAPSULE ORAL at 08:02

## 2020-02-25 RX ADMIN — GABAPENTIN 600 MG: 300 CAPSULE ORAL at 08:02

## 2020-02-25 RX ADMIN — SODIUM CHLORIDE: 0.9 INJECTION, SOLUTION INTRAVENOUS at 05:02

## 2020-02-25 RX ADMIN — Medication 400 MG: at 03:02

## 2020-02-25 RX ADMIN — POTASSIUM CHLORIDE 20 MEQ: 1500 TABLET, EXTENDED RELEASE ORAL at 06:02

## 2020-02-25 RX ADMIN — OYSTER SHELL CALCIUM WITH VITAMIN D 1 TABLET: 500; 200 TABLET, FILM COATED ORAL at 08:02

## 2020-02-25 RX ADMIN — POTASSIUM & SODIUM PHOSPHATES POWDER PACK 280-160-250 MG 1 PACKET: 280-160-250 PACK at 05:02

## 2020-02-25 RX ADMIN — ACYCLOVIR 800 MG: 200 CAPSULE ORAL at 08:02

## 2020-02-25 RX ADMIN — PANTOPRAZOLE SODIUM 40 MG: 40 TABLET, DELAYED RELEASE ORAL at 08:02

## 2020-02-25 RX ADMIN — SODIUM CHLORIDE 100 ML/HR: 0.9 INJECTION, SOLUTION INTRAVENOUS at 07:02

## 2020-02-25 RX ADMIN — POTASSIUM & SODIUM PHOSPHATES POWDER PACK 280-160-250 MG 1 PACKET: 280-160-250 PACK at 10:02

## 2020-02-25 RX ADMIN — Medication 400 MG: at 10:02

## 2020-02-25 RX ADMIN — FLUCONAZOLE 400 MG: 200 TABLET ORAL at 08:02

## 2020-02-25 RX ADMIN — Medication 30 ML: at 01:02

## 2020-02-25 RX ADMIN — LEVOFLOXACIN 500 MG: 500 TABLET, FILM COATED ORAL at 08:02

## 2020-02-25 RX ADMIN — POTASSIUM & SODIUM PHOSPHATES POWDER PACK 280-160-250 MG 1 PACKET: 280-160-250 PACK at 06:02

## 2020-02-25 RX ADMIN — AMLODIPINE BESYLATE 5 MG: 5 TABLET ORAL at 08:02

## 2020-02-25 RX ADMIN — SERTRALINE HYDROCHLORIDE 50 MG: 50 TABLET ORAL at 08:02

## 2020-02-25 RX ADMIN — Medication 400 MG: at 06:02

## 2020-02-25 RX ADMIN — Medication 30 ML: at 04:02

## 2020-02-25 RX ADMIN — POTASSIUM & SODIUM PHOSPHATES POWDER PACK 280-160-250 MG 1 PACKET: 280-160-250 PACK at 03:02

## 2020-02-25 RX ADMIN — TRAMADOL HYDROCHLORIDE 50 MG: 50 TABLET, FILM COATED ORAL at 08:02

## 2020-02-25 NOTE — PROGRESS NOTES
"[facility]  Adult Nutrition  Progress Note    SUMMARY       Recommendations    Recommendation:   1. Continue regular diet, encourage good PO intake of high calorie high protein foods   2. Continue boost glucose to increase intake   3. RD to monitor and follow up     Goals: pt to tolerate >85% of EEN/EPN by RD follow up   Nutrition Goal Status: progressing towards goal  Communication of RD Recs: other (comment)(POC)    Reason for Assessment    Reason For Assessment: RD follow-up  Diagnosis: (Multiple myeloma)  Relevant Medical History: HTN; HLD; ICH; breast cancer; DM2; CHF  Interdisciplinary Rounds: did not attend  General Information Comments: Pt continues with fair PO intake, states she eats 100% of bfst meal and ~50% of lunch and dinner. Drinking boost BID to increase intake. Encouraged snacks between meals. No c/o N/V/C/D reported just decreased appetite. Wt gain of ~4 lbs over past week, returning to UBW ~170 lbs per pt. NFPE not indicated at this time, pt with no s/s of malnutrition, and appears well nourished.   Nutrition Discharge Planning: adequate intake via PO diet     Nutrition Risk Screen    Nutrition Risk Screen: no indicators present    Nutrition/Diet History    Spiritual, Cultural Beliefs, Gnosticist Practices, Values that Affect Care: no  Food Allergies: NKFA  Factors Affecting Nutritional Intake: decreased appetite    Anthropometrics    Temp: 98.3 °F (36.8 °C)  Height Method: Stated  Height: 5' 3" (160 cm)  Height (inches): 63 in  Weight Method: Standard Scale  Weight: 73 kg (160 lb 15 oz)  Weight (lb): 160.94 lb  Ideal Body Weight (IBW), Female: 115 lb  % Ideal Body Weight, Female (lb): 142.82 %  BMI (Calculated): 28.5  BMI Grade: 25 - 29.9 - overweight       Lab/Procedures/Meds    Pertinent Labs Reviewed: reviewed  Pertinent Labs Comments: BUN 3;  Ca 7.7; Phos 2.5; Mg 1.5   Pertinent Medications Reviewed: reviewed  Pertinent Medications Comments: gabapentin; calcium-vitaminD; " pantoprazole;     Estimated/Assessed Needs    Weight Used For Calorie Calculations: 73.4 kg (161 lb 13.1 oz)  Energy Calorie Requirements (kcal): 5717-2141 kcal/d  Energy Need Method: Kcal/kg  Protein Requirements:  g/day   Weight Used For Protein Calculations: 73.4 kg (161 lb 13.1 oz)  Fluid Requirements (mL): 1 mL/kcal or per MD  RDA Method (mL): 2202    Nutrition Prescription Ordered    Current Diet Order: Regular diet   Oral Nutrition Supplement: boost glucose chocolate     Evaluation of Received Nutrient/Fluid Intake    I/O: +3.9 L since admit  Energy Calories Required: meeting needs  Protein Required: not meeting needs  Fluid Required: meeting needs  Comments: LBM 2/24  Tolerance: tolerating  % Intake of Estimated Energy Needs: 50 - 75 %  % Meal Intake: 50 - 75 %    Nutrition Risk    Level of Risk/Frequency of Follow-up: low     Assessment and Plan  Nutrition Problem  increased nutrient needs     Related to (etiology):   Physiological needs 2/2 multiple myeloma      Signs and Symptoms (as evidenced by):   Pt to receive BMT       Interventions  (treatment strategy):  Collaboration of care with other providers  Commercial beverage     Nutrition Diagnosis Status:   Continues     Monitor and Evaluation    Food and Nutrient Intake: energy intake, food and beverage intake  Food and Nutrient Adminstration: diet order  Knowledge/Beliefs/Attitudes: food and nutrition knowledge/skill  Anthropometric Measurements: weight, weight change  Biochemical Data, Medical Tests and Procedures: electrolyte and renal panel, lipid profile, gastrointestinal profile, glucose/endocrine profile, inflammatory profile  Nutrition-Focused Physical Findings: overall appearance     Nutrition Follow-Up    RD Follow-up?: Yes

## 2020-02-25 NOTE — PT/OT/SLP PROGRESS
"Physical Therapy Treatment    Patient Name:  Shelby Thompson   MRN:  [id number]    Recommendations:     Discharge Recommendations:  home health PT   Discharge Equipment Recommendations: none   Barriers to discharge: None    Assessment:     Shelby Thompson is a 69 y.o. female admitted with a medical diagnosis of Status post autologous bone marrow transplant.  She presents with the following impairments/functional limitations:  impaired endurance, gait instability, impaired functional mobilty, impaired self care skills, impaired balance(impaired fine motor coordination of RUE). Pt presents with impaired fine motor coordination of her RUE, which affects her hand-writing and ability to perform everyday tasks such as getting dressed, picking up an object for use, etc. She was able to ambulate ~100 feet with use of a rolling walker, supervision and negotiate three steps with B HR, SBA. We worked on her hand-writing today, which improved with each trial. Educated pt to continue practicing the tasks that give her trouble using her RUE. Pt will benefit from continued PT services to improve her overall functional mobility and safety.    Rehab Prognosis: Fair; patient would benefit from acute skilled PT services to address these deficits and reach maximum level of function.    Recent Surgery: * No surgery found *      Plan:     During this hospitalization, patient to be seen 2 x/week to address the identified rehab impairments via gait training, therapeutic activities, therapeutic exercises, neuromuscular re-education and progress toward the following goals:    · Plan of Care Expires:  03/11/20    Subjective     Chief Complaint: RUE "tremor" from her stroke.  Patient/Family Comments/goals: To improve her hand-writing.  Pain/Comfort:  · Pain Rating 1: 0/10      Objective:     Communicated with nurse prior to session.  Patient found HOB elevated with peripheral IV upon PT entry to room.     General Precautions: Standard, fall "   Orthopedic Precautions:N/A   Braces: N/A     Functional Mobility:  · Bed Mobility:  Supine to Sit: supervision  · Transfers:  Sit to Stand:  supervision with rolling walker  · Gait: 100 ft with sup-SBA, RW- no loss of balance, involuntary movement noted with RUE.  · Balance: SBA, RW  · Stairs:  Pt ascended/descended 3 stair(s) with No Assistive Device with bilateral handrails with Stand-by Assistance.       AM-PAC 6 CLICK MOBILITY  Turning over in bed (including adjusting bedclothes, sheets and blankets)?: 4  Sitting down on and standing up from a chair with arms (e.g., wheelchair, bedside commode, etc.): 4  Moving from lying on back to sitting on the side of the bed?: 4  Moving to and from a bed to a chair (including a wheelchair)?: 4  Need to walk in hospital room?: 4  Climbing 3-5 steps with a railing?: 4  Basic Mobility Total Score: 24       Therapeutic Activities and Exercises:   Pt taught how to perform finger to thumb opposition exercise to practice her fine motor coordination. Taught pt to perform with her LUE to initiate the mirroring concept.   Pt practiced writing her name, which improved with each trial (~5 trials). Pt did best when given lines on which to write.   Pt performed hip flexion via heel to shin exercise x 10 reps on each LE. No ataxia observed in her lower extremities.    Patient left up in chair with call button in reach..    GOALS:   Multidisciplinary Problems     Physical Therapy Goals        Problem: Physical Therapy Goal    Goal Priority Disciplines Outcome Goal Variances Interventions   Physical Therapy Goal     PT, PT/OT Ongoing, Progressing     Description:  Goals to be met by: 2020     Patient will increase functional independence with mobility by performin. Bed to chair transfer with Supervision using Rolling Walker  2. Gait  x 150 feet with Supervision using Rolling Walker.   3. Ascend/descend 3 stair with bilateral Handrails Stand-by Assistance using no AD.  Met(2/25/2020)  4. Lower extremity exercise program x20 reps per handout, with independence                       Time Tracking:     PT Received On: 02/25/20  PT Start Time: 1400     PT Stop Time: 1430  PT Total Time (min): 30 min     Billable Minutes: Gait Training 15 and Neuromuscular Re-education 15    Treatment Type: Treatment  PT/PTA: PT     PTA Visit Number: 0   Angela Jo, PT, DPT  2/25/2020

## 2020-02-25 NOTE — SUBJECTIVE & OBJECTIVE
Subjective:     Interval History: Day +14 Verenice AutoSCT for MM. Pt doing well without nausea and stools are soft. Plan for d/c later today.   Objective:     Vital Signs (Most Recent):  Temp: 98.5 °F (36.9 °C) (02/25/20 1500)  Pulse: 78 (02/25/20 1500)  Resp: 16 (02/25/20 0715)  BP: 124/60 (02/25/20 1500)  SpO2: 96 % (02/25/20 0715) Vital Signs (24h Range):  Temp:  [97.8 °F (36.6 °C)-98.6 °F (37 °C)] 98.5 °F (36.9 °C)  Pulse:  [71-87] 78  Resp:  [14-16] 16  SpO2:  [87 %-97 %] 96 %  BP: (116-129)/(58-74) 124/60     Weight: 73 kg (160 lb 15 oz)  Body mass index is 28.51 kg/m².  Body surface area is 1.8 meters squared.      Intake/Output - Last 3 Shifts       02/23 0700 - 02/24 0659 02/24 0700 - 02/25 0659 02/25 0700 - 02/26 0659    P.O. 740 1220     I.V. (mL/kg) 2214.7 (31) 2305 (31.6)     Blood 640      Total Intake(mL/kg) 3594.7 (50.3) 3525 (48.3)     Urine (mL/kg/hr) 2700 (1.6) 1275 (0.7) 800 (1.2)    Emesis/NG output  400     Stool 0 0 0    Total Output 2700 1675 800    Net +894.7 +1850 -800           Urine Occurrence 1 x 2 x     Stool Occurrence 2 x 2 x 2 x          Physical Exam   Constitutional: She is oriented to person, place, and time. Vital signs are normal. She appears well-developed and well-nourished. She is cooperative.   HENT:   Head: Normocephalic.   Eyes: Pupils are equal, round, and reactive to light. Conjunctivae, EOM and lids are normal.   Neck: Trachea normal and normal range of motion.   Cardiovascular: Normal rate, regular rhythm, S1 normal, S2 normal and normal heart sounds.   Pulmonary/Chest: Effort normal and breath sounds normal.   Abdominal: Soft. Normal appearance and bowel sounds are normal.   Musculoskeletal: Normal range of motion.   Chronic tremor to RUE 2/2 stroke   Neurological: She is alert and oriented to person, place, and time. Coordination and gait normal.   Skin: Skin is dry and intact. She is not diaphoretic. No pallor.   vascath to chest wall c/d/i   Psychiatric: She has a  normal mood and affect. Her speech is normal and behavior is normal. Judgment and thought content normal. Cognition and memory are normal.       Significant Labs:   CBC:     Recent Labs   Lab 02/26/20  0347   WBC 3.55*   RBC 3.26*   HGB 8.4*   HCT 27.7*   PLT 16*   MCV 85   MCH 25.8*   MCHC 30.3*     Recent Labs   Lab 02/26/20  0347   CALCIUM 7.9*   PROT 6.0      K 3.8   CO2 26      BUN 3*   CREATININE 0.7   ALKPHOS 58   ALT 8*   AST 13   BILITOT 0.4       Diagnostic Results:  I have reviewed all pertinent imaging results/findings within the past 24 hours.

## 2020-02-25 NOTE — PROGRESS NOTES
[facility]  Hematology  Bone Marrow Transplant  Progress Note    Patient Name: Shelby Thompson  Admission Date: 2/10/2020  Hospital Length of Stay: 15 days  Code Status: Full Code    Subjective:     Interval History: Day +13 Verenice AutoSCT for MM. ANC is 1500. She is doing well, nausea is better. Probably DC tomorrow. She lives locally.     Objective:     Vital Signs (Most Recent):  Temp: 98.5 °F (36.9 °C) (02/25/20 1500)  Pulse: 78 (02/25/20 1500)  Resp: 16 (02/25/20 0715)  BP: 124/60 (02/25/20 1500)  SpO2: 96 % (02/25/20 0715) Vital Signs (24h Range):  Temp:  [97.8 °F (36.6 °C)-98.6 °F (37 °C)] 98.5 °F (36.9 °C)  Pulse:  [71-87] 78  Resp:  [14-16] 16  SpO2:  [87 %-97 %] 96 %  BP: (116-129)/(58-74) 124/60     Weight: 73 kg (160 lb 15 oz)  Body mass index is 28.51 kg/m².  Body surface area is 1.8 meters squared.      Intake/Output - Last 3 Shifts       02/23 0700 - 02/24 0659 02/24 0700 - 02/25 0659 02/25 0700 - 02/26 0659    P.O. 740 1220     I.V. (mL/kg) 2214.7 (31) 2305 (31.6)     Blood 640      Total Intake(mL/kg) 3594.7 (50.3) 3525 (48.3)     Urine (mL/kg/hr) 2700 (1.6) 1275 (0.7) 800 (1.2)    Emesis/NG output  400     Stool 0 0 0    Total Output 2700 1675 800    Net +894.7 +1850 -800           Urine Occurrence 1 x 2 x     Stool Occurrence 2 x 2 x 2 x          Physical Exam   Constitutional: She is oriented to person, place, and time. Vital signs are normal. She appears well-developed and well-nourished. She is cooperative.   HENT:   Head: Normocephalic.   Eyes: Pupils are equal, round, and reactive to light. Conjunctivae, EOM and lids are normal.   Neck: Trachea normal and normal range of motion.   Cardiovascular: Normal rate, regular rhythm, S1 normal, S2 normal and normal heart sounds.   Pulmonary/Chest: Effort normal and breath sounds normal.   Abdominal: Soft. Normal appearance and bowel sounds are normal.   Musculoskeletal: Normal range of motion.   Chronic tremor to RUE 2/2 stroke    Neurological: She is alert and oriented to person, place, and time. Coordination and gait normal.   Skin: Skin is dry and intact. She is not diaphoretic. No pallor.   vascath to chest wall c/d/i   Psychiatric: She has a normal mood and affect. Her speech is normal and behavior is normal. Judgment and thought content normal. Cognition and memory are normal.       Significant Labs:   CBC:   Recent Labs   Lab 02/24/20 0426 02/25/20  0408   WBC 0.38* 1.88*   HGB 7.8* 8.4*   HCT 25.4* 27.7*   PLT 16* 13*   , CMP:   Recent Labs   Lab 02/24/20 0426 02/25/20  0408    141   K 3.7 3.6    109   CO2 25 26   GLU 75 78   BUN 4* 3*   CREATININE 0.7 0.7   CALCIUM 7.7* 7.7*   PROT 5.5* 5.7*   ALBUMIN 3.1* 3.2*   BILITOT 0.5 0.5   ALKPHOS 46* 52*   AST 10 10   ALT 8* 7*   ANIONGAP 7* 6*   EGFRNONAA >60.0 >60.0   ,  Diagnostic Results:  I have reviewed all pertinent imaging results/findings within the past 24 hours.    Assessment/Plan:     * Status post autologous bone marrow transplant  -Today is day +13, ANC is 1500.   -received Melphalan 140mg/m2 without difficulty  -stem cell infusion was on 2/12 without issue, received 3 bags (2.85 x 10^6) CD 34 stem cells  -some expected fatigue, poor appetite, not watery diarrhea, and nausea which is improved with scheduled zofran and PRN Compazine    Regimen as below:  Transplant Day -2: IV fluids  Transplant Day -1 through Day 0: Antiemetics   Transplant Day -1: Melphalan   Transplant Day 0: Stem Cell Infusion   Transplant Day +7: Growth factor begins    Diarrhea  - C diff neg   - on immodium PRN     Chemotherapy-induced nausea  - continue scheduled Zofran  - PRN compazine and Ativan    Type 2 diabetes mellitus without complication, without long-term current use of insulin  -monitor closely while inpatient; not on active medication     Essential hypertension  -continue norvasc       Drug-induced polyneuropathy  -pt has had numbness/tingling to hands and BUE tremors post CVA,  for which she is on gabapentin 600 mg BID  - prn tramadol     Pancytopenia due to antineoplastic chemotherapy  -hx of JESSE; received single dose feraheme pre transplant  -colonoscopy okay for transplant  -will monitor daily CBC inpatient  -transfuse for Hgb <7, Plt <10K or bleeding      Interstitial lung disease  - had abnormal PFTs during transplant evaluation 10/2019; seen by pulm; no significant desaturation on 6MWT; okay per transplant per pulm     Multiple myeloma in remission  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) High Risk disease; complex karyotype by CG; , t(14;20), monosomy 13 on FISH  -She has achieved and tolerated well VRd x completing 7, 28 day cycles and achieving stringent CR as of Dec 2019 marrow/biochemical studies   -adequate response to proceed with autoSCT  -she has PS 1-2 and multiple abnormalities in her pre transplant eval that have been cleared by pulmonology, cardiology, gynecology and are not prohibitive to proceed with transplant  -she has JESSE - sp  one dose of iv feraheme with response  will need reassessment by GI; if colonosocpy normal  proceed with transplant   -no further velcade maintenance pre transplant, last dose was 12/24/19  -plan for reduced dose clifton 140mg/m2 in light of age    Recurrent major depressive disorder, in full remission  -continue zoloft     Liver mass  -vascular per recent MRI in 09/2019 with no changes; seen by hepatology pre-transplant and signed off; recommend repeat MRI in 6 months     History of CVA with residual deficit  -one in 2008, one in 2011  -has been cleared by cardiology for transplant     Hyperlipemia  -on praluent, PCSK9 inhibitor; holding during admission     Personal history of malignant neoplasm of breast  L breast cancer DCIS stage 0  -s/p lumpectomy and radiation, no endocrine tx due to CVA       VTE Risk Mitigation (From admission, onward)         Ordered     heparin (porcine) injection  1,600 Units  As needed (PRN)      02/10/20 2141     IP VTE HIGH RISK PATIENT  Once      02/10/20 1738                Discussed with Dr. Whitney Henderson MD  Bone Marrow Transplant, PGY 4  [facility]

## 2020-02-25 NOTE — PLAN OF CARE
Pt AAO; Today is day + 13 of her auto SCT she  is   independent with walker at bedside for  oob use . Vital signs stable and pt remained afebrile throughout shift. Patient electrolytes replaced orally today. IV fluids continue at current order . Good uop this shift . BM today Patient reporting stool not as loose as yesterday . .  Pt remained free from falls during shift.  Bed lowest position and locked.  Call light in reach.  [facility]

## 2020-02-25 NOTE — PLAN OF CARE
Patient remained free from falls throughout shift, call bell within reach. Day +13 following her Auto SCT. No complaints of pain or discomfort. Vitals stable, will continue to monitor.

## 2020-02-26 VITALS
BODY MASS INDEX: 28.56 KG/M2 | DIASTOLIC BLOOD PRESSURE: 64 MMHG | RESPIRATION RATE: 14 BRPM | HEART RATE: 75 BPM | HEIGHT: 63 IN | OXYGEN SATURATION: 95 % | SYSTOLIC BLOOD PRESSURE: 118 MMHG | TEMPERATURE: 98 F | WEIGHT: 161.19 LBS

## 2020-02-26 DIAGNOSIS — C90.00 MULTIPLE MYELOMA NOT HAVING ACHIEVED REMISSION: Primary | ICD-10-CM

## 2020-02-26 LAB
ALBUMIN SERPL BCP-MCNC: 3.3 G/DL (ref 3.5–5.2)
ALP SERPL-CCNC: 58 U/L (ref 55–135)
ALT SERPL W/O P-5'-P-CCNC: 8 U/L (ref 10–44)
ANION GAP SERPL CALC-SCNC: 7 MMOL/L (ref 8–16)
ANISOCYTOSIS BLD QL SMEAR: SLIGHT
AST SERPL-CCNC: 13 U/L (ref 10–40)
BASOPHILS # BLD AUTO: 0.03 K/UL (ref 0–0.2)
BASOPHILS NFR BLD: 0.8 % (ref 0–1.9)
BILIRUB SERPL-MCNC: 0.4 MG/DL (ref 0.1–1)
BLD PROD TYP BPU: NORMAL
BLOOD UNIT EXPIRATION DATE: NORMAL
BLOOD UNIT TYPE CODE: 5100
BLOOD UNIT TYPE: NORMAL
BUN SERPL-MCNC: 3 MG/DL (ref 8–23)
CALCIUM SERPL-MCNC: 7.9 MG/DL (ref 8.7–10.5)
CHLORIDE SERPL-SCNC: 107 MMOL/L (ref 95–110)
CO2 SERPL-SCNC: 26 MMOL/L (ref 23–29)
CODING SYSTEM: NORMAL
CREAT SERPL-MCNC: 0.7 MG/DL (ref 0.5–1.4)
DACRYOCYTES BLD QL SMEAR: ABNORMAL
DIFFERENTIAL METHOD: ABNORMAL
DISPENSE STATUS: NORMAL
DOHLE BOD BLD QL SMEAR: PRESENT
EOSINOPHIL # BLD AUTO: 0 K/UL (ref 0–0.5)
EOSINOPHIL NFR BLD: 0 % (ref 0–8)
ERYTHROCYTE [DISTWIDTH] IN BLOOD BY AUTOMATED COUNT: 19.1 % (ref 11.5–14.5)
EST. GFR  (AFRICAN AMERICAN): >60 ML/MIN/1.73 M^2
EST. GFR  (NON AFRICAN AMERICAN): >60 ML/MIN/1.73 M^2
GLUCOSE SERPL-MCNC: 80 MG/DL (ref 70–110)
HCT VFR BLD AUTO: 27.7 % (ref 37–48.5)
HGB BLD-MCNC: 8.4 G/DL (ref 12–16)
HYPOCHROMIA BLD QL SMEAR: ABNORMAL
IMM GRANULOCYTES # BLD AUTO: 0.13 K/UL (ref 0–0.04)
IMM GRANULOCYTES NFR BLD AUTO: 3.7 % (ref 0–0.5)
LYMPHOCYTES # BLD AUTO: 0.2 K/UL (ref 1–4.8)
LYMPHOCYTES NFR BLD: 4.5 % (ref 18–48)
MAGNESIUM SERPL-MCNC: 1.4 MG/DL (ref 1.6–2.6)
MCH RBC QN AUTO: 25.8 PG (ref 27–31)
MCHC RBC AUTO-ENTMCNC: 30.3 G/DL (ref 32–36)
MCV RBC AUTO: 85 FL (ref 82–98)
MONOCYTES # BLD AUTO: 0.5 K/UL (ref 0.3–1)
MONOCYTES NFR BLD: 14.6 % (ref 4–15)
NEUTROPHILS # BLD AUTO: 2.7 K/UL (ref 1.8–7.7)
NEUTROPHILS NFR BLD: 76.4 % (ref 38–73)
NRBC BLD-RTO: 1 /100 WBC
NUM UNITS TRANS WBC-POOR PLATPHERESIS: NORMAL
OVALOCYTES BLD QL SMEAR: ABNORMAL
PHOSPHATE SERPL-MCNC: 2.7 MG/DL (ref 2.7–4.5)
PLATELET # BLD AUTO: 16 K/UL (ref 150–350)
PLATELET BLD QL SMEAR: ABNORMAL
PMV BLD AUTO: ABNORMAL FL (ref 9.2–12.9)
POIKILOCYTOSIS BLD QL SMEAR: SLIGHT
POLYCHROMASIA BLD QL SMEAR: ABNORMAL
POTASSIUM SERPL-SCNC: 3.8 MMOL/L (ref 3.5–5.1)
PROT SERPL-MCNC: 6 G/DL (ref 6–8.4)
RBC # BLD AUTO: 3.26 M/UL (ref 4–5.4)
SODIUM SERPL-SCNC: 140 MMOL/L (ref 136–145)
TOXIC GRANULES BLD QL SMEAR: PRESENT
WBC # BLD AUTO: 3.55 K/UL (ref 3.9–12.7)

## 2020-02-26 PROCEDURE — 25000003 PHARM REV CODE 250: Mod: HCNC | Performed by: NURSE PRACTITIONER

## 2020-02-26 PROCEDURE — P9037 PLATE PHERES LEUKOREDU IRRAD: HCPCS | Mod: HCNC

## 2020-02-26 PROCEDURE — 99233 PR SUBSEQUENT HOSPITAL CARE,LEVL III: ICD-10-PCS | Mod: HCNC,,, | Performed by: INTERNAL MEDICINE

## 2020-02-26 PROCEDURE — 25000003 PHARM REV CODE 250: Mod: HCNC | Performed by: INTERNAL MEDICINE

## 2020-02-26 PROCEDURE — 25000003 PHARM REV CODE 250: Mod: HCNC | Performed by: STUDENT IN AN ORGANIZED HEALTH CARE EDUCATION/TRAINING PROGRAM

## 2020-02-26 PROCEDURE — A9155 ARTIFICIAL SALIVA: HCPCS | Mod: HCNC | Performed by: INTERNAL MEDICINE

## 2020-02-26 PROCEDURE — 63600175 PHARM REV CODE 636 W HCPCS: Mod: JG,HCNC | Performed by: INTERNAL MEDICINE

## 2020-02-26 PROCEDURE — 83735 ASSAY OF MAGNESIUM: CPT | Mod: HCNC

## 2020-02-26 PROCEDURE — 84100 ASSAY OF PHOSPHORUS: CPT | Mod: HCNC

## 2020-02-26 PROCEDURE — 80053 COMPREHEN METABOLIC PANEL: CPT | Mod: HCNC

## 2020-02-26 PROCEDURE — 85025 COMPLETE CBC W/AUTO DIFF WBC: CPT | Mod: HCNC

## 2020-02-26 PROCEDURE — 63600175 PHARM REV CODE 636 W HCPCS: Mod: HCNC | Performed by: INTERNAL MEDICINE

## 2020-02-26 PROCEDURE — 63600175 PHARM REV CODE 636 W HCPCS: Mod: HCNC | Performed by: NURSE PRACTITIONER

## 2020-02-26 PROCEDURE — 99233 SBSQ HOSP IP/OBS HIGH 50: CPT | Mod: HCNC,,, | Performed by: INTERNAL MEDICINE

## 2020-02-26 RX ORDER — LANOLIN ALCOHOL/MO/W.PET/CERES
400 CREAM (GRAM) TOPICAL 2 TIMES DAILY
Qty: 60 TABLET | Refills: 2 | Status: SHIPPED | OUTPATIENT
Start: 2020-02-26 | End: 2020-04-22 | Stop reason: SDUPTHER

## 2020-02-26 RX ORDER — CHOLECALCIFEROL (VITAMIN D3) 25 MCG
1000 TABLET ORAL NIGHTLY
Start: 2020-02-26 | End: 2020-02-26 | Stop reason: HOSPADM

## 2020-02-26 RX ORDER — HYDROCODONE BITARTRATE AND ACETAMINOPHEN 500; 5 MG/1; MG/1
TABLET ORAL
Status: DISCONTINUED | OUTPATIENT
Start: 2020-02-26 | End: 2020-02-26 | Stop reason: HOSPADM

## 2020-02-26 RX ORDER — ACYCLOVIR 200 MG/1
800 CAPSULE ORAL 2 TIMES DAILY
Qty: 240 CAPSULE | Refills: 11 | Status: SHIPPED | OUTPATIENT
Start: 2020-02-26 | End: 2020-02-26 | Stop reason: HOSPADM

## 2020-02-26 RX ORDER — FERROUS SULFATE, DRIED 160(50) MG
1 TABLET, EXTENDED RELEASE ORAL 2 TIMES DAILY
Qty: 60 TABLET | Refills: 0 | COMMUNITY
Start: 2020-02-26 | End: 2021-03-31

## 2020-02-26 RX ORDER — ACYCLOVIR 800 MG/1
800 TABLET ORAL 2 TIMES DAILY
Qty: 60 TABLET | Refills: 11 | Status: SHIPPED | OUTPATIENT
Start: 2020-02-26 | End: 2021-03-31

## 2020-02-26 RX ORDER — GABAPENTIN 300 MG/1
CAPSULE ORAL
Qty: 90 CAPSULE | Refills: 5 | Status: SHIPPED | OUTPATIENT
Start: 2020-02-26 | End: 2021-02-17

## 2020-02-26 RX ADMIN — FLUCONAZOLE 400 MG: 200 TABLET ORAL at 08:02

## 2020-02-26 RX ADMIN — ACYCLOVIR 800 MG: 200 CAPSULE ORAL at 08:02

## 2020-02-26 RX ADMIN — OYSTER SHELL CALCIUM WITH VITAMIN D 1 TABLET: 500; 200 TABLET, FILM COATED ORAL at 08:02

## 2020-02-26 RX ADMIN — AMLODIPINE BESYLATE 5 MG: 5 TABLET ORAL at 08:02

## 2020-02-26 RX ADMIN — Medication 400 MG: at 02:02

## 2020-02-26 RX ADMIN — Medication 400 MG: at 06:02

## 2020-02-26 RX ADMIN — LEVOFLOXACIN 500 MG: 500 TABLET, FILM COATED ORAL at 08:02

## 2020-02-26 RX ADMIN — ACETAMINOPHEN 650 MG: 325 TABLET ORAL at 10:02

## 2020-02-26 RX ADMIN — SODIUM CHLORIDE: 0.9 INJECTION, SOLUTION INTRAVENOUS at 03:02

## 2020-02-26 RX ADMIN — PANTOPRAZOLE SODIUM 40 MG: 40 TABLET, DELAYED RELEASE ORAL at 08:02

## 2020-02-26 RX ADMIN — POTASSIUM CHLORIDE 20 MEQ: 1500 TABLET, EXTENDED RELEASE ORAL at 06:02

## 2020-02-26 RX ADMIN — TRAMADOL HYDROCHLORIDE 50 MG: 50 TABLET, FILM COATED ORAL at 03:02

## 2020-02-26 RX ADMIN — Medication 400 MG: at 10:02

## 2020-02-26 RX ADMIN — SERTRALINE HYDROCHLORIDE 50 MG: 50 TABLET ORAL at 08:02

## 2020-02-26 RX ADMIN — FILGRASTIM 300 MCG: 480 INJECTION, SOLUTION INTRAVENOUS; SUBCUTANEOUS at 08:02

## 2020-02-26 RX ADMIN — GABAPENTIN 300 MG: 300 CAPSULE ORAL at 08:02

## 2020-02-26 RX ADMIN — Medication 30 ML: at 08:02

## 2020-02-26 NOTE — PLAN OF CARE
Patient remained free from falls throughout shift, call bell within reach. Day +14 following her Auto SCT. Complained of L shoulder pain overnight, prn tramadol given twice. Should be d/c'd today. Vitals stable, will continue to monitor.

## 2020-02-26 NOTE — PROGRESS NOTES
BMT Pharmacist Discharge Note     All discharge medications were reviewed with the patient and her family member. Four medications were sent to the [facility] pharmacy for bedside delivery: acyclovir, gabapentin, K-Phos Neutral, and magnesium.  She will purchase calcium-vitamin D over the counter.     Medication Indication Morning Afternoon Evening/Night   Acyclovir 800mg  Viral infection prevention 1 tablet  1 tablet   Gabapentin (Neurontin) 300mg Peripheral neuropathy 1 capsule  2 capsules   Amlodipine (Norvasc) 5mg Blood pressure 1 tablet     Loratadine (Claritin) 10mg Allergies   1 tablet   Omeprazole (Prilosec) 40mg Heart burn 1 tablet     Sertraline (Zoloft) 50mg Depression/mood 1 tablet     Ferrous gluconate 324mg Iron supplement 1 tablet  1 tablet   Calcium-Vitamin D 500mg-200units Supplement 1 tablet  1 tablet   K-Phos Neutral 250mg Phosphorous supplement 1 tablet  1 tablet   Magnesium Oxide 400mg Supplement 1 tablet  1 tablet   Evolocumab (Repatha) Cholesterol Inject 1mL into the skin every 2 weeks     AS NEEDED MEDICATIONS:  Ondansetron (Zofran) 4mg every 6 hours as needed for nausea  Tramadol (Ultram) 50mg two times a day as needed for pain      NO LONGER TAKE:   Acetaminophen (Tylenol)  Allopurinol  Aspirin  Lenalidomide (Revlimid)  Bisacodyl (Dulcolax)  Hydrocodone-acetaminophen (Norco)  Praluent  Vitamin D      Notes:   I discussed the importance of taking acyclovir up until day +365 to prevent viral infections.      Sent 2 weeks of K-Phos. If still low will need a new prescription in 2 weeks. Gabapentin was increased during hospitalization so new prescription was sent for this as well.     She will contact her physician that prescribes Repatha to determine when she will start this medication (replaced Praluent).  All questions were answered.      Zainab Tatum, PharmD, BCPS, BCOP  Clinical Pharmacy Specialist   BMT/Hematology Oncology  SpectraLink: [phone]

## 2020-02-26 NOTE — ASSESSMENT & PLAN NOTE
-Today is day +14  -received Melphalan 140mg/m2 without difficulty  -stem cell infusion was on 2/12 without issue, received 3 bags (2.85 x 10^6) CD 34 stem cells  -some expected fatigue, poor appetite, not watery diarrhea, and nausea which is improved with scheduled zofran and PRN Compazine    Regimen as below:  Transplant Day -2: IV fluids  Transplant Day -1 through Day 0: Antiemetics   Transplant Day -1: Melphalan   Transplant Day 0: Stem Cell Infusion   Transplant Day +7: Growth factor begins

## 2020-02-26 NOTE — DISCHARGE SUMMARY
"[facility]  Hematology  Bone Marrow Transplant  Discharge Summary      Patient Name: Shelby Thompson  MRN: [id number]  Admission Date: 2/10/2020  Hospital Length of Stay: 16 days  Discharge Date and Time:  02/26/2020 1:07 PM  Attending Physician: Catie Olson MD   Discharging Provider: Genny Napoles MD  Primary Care Provider: Emanuel Arevalo MD    HPI: 69 y.o. female patient of Dr. Tineo's who presents today for admission for planned Verenice 140 Auto SCT. She has received cardiac, pulm, GI, and gyn clearance for transplant. Myeloma in CR as of Dec 2019 marrow biopsy and biochemical studies. She is feeling well overall today. Slightly anxious. Denies fevers, chills, chest pain, sob, n/v/d/c. Continues with chronic neuropathy.    * No surgery found *     Hospital Course: 02/10/2020 admitted from clinic for Verenice 140 autologous stem cell transplant. Will start IVF tonight  02/11/2020 Day -1 Verenice auto, received chemo this am without difficulty. NEON, no complaints this am. VSS.   02/12/2020 Day 0 Verenice auto, received stem cell infusion of 2.85 x 10^6 (3 bags) without difficulty except for hypotension after which resolved with 500 cc NS bolus. Denies dizziness, headaches, nausea or diarrhea.   02/13/2020 Day +1 Verenice auto, NEON, VSS, no complaints this am  02/14/2020  Day +2 Verenice auto, fatigue and decreased appetite noted today. Some "diarrhea" but not loose/watery. Nausea so scheduled zofran. Will consider IVFs tomorrow if needed  2/15/2020 Day +3 Verenice auto. Nausea and diarrhea are better.  Complained of mild decreased in appetite.  Vitals stable. WBC-0.33, ANC of 280, hemoglobin-9.0 and platelets 158 K  02/16/2020  Day +4 Verenice auto. Nausea and diarrhea are better.  Vitals stable. WBC-0.30, ANC of 260, hemoglobin-8.8 and platelets 161 K  02/17/2020 Day +5 Verenice auto. VSS, afebrile, now on scheduled Zofran and denies nausea this am. Denies diarrhea as well. Complains of poor appetite. BP low this am and " holding scheduled BP meds  02/18/2020 Day +6 Verenice auto. VSS, afebrile, ANC 0, reports nausea this am and diarrhea controlled. Denies pain or mouth sores.  02/19/2020 Day +7 Verenice auto, ANC 40 starting Neupogen today. Afebrile, VSS. Nausea and diarrhea controlled.   02/20/2020 Day +8 Verenice auto, ANC 0 starting Neupogen today. Afebrile, VSS. Nausea and diarrhea controlled. Poor appetite due to taste changes  02/21/2020: Day + 9 from Verenice Auto SCT for MM. ANC 0 today. Continues to be afebrile. VSS. Loose stools. Checking for c-diff. Will start imodium if negative. Nausea responding well to zofran. Eating 25-50% of meals  02/22/2020 Day + 10 Verenice Auto SCT for MM. ANC 0. NAEON, HDS. 2 BM overnight, C.diff negative, started PRN Immodium.   02/23/2020 Day + 11 Verenice Auto SCT for MM. ANC 0. NAEON, HDS. Complaining of abdominal bloating. Platelet count is 3, given 1pack platelets. Hgb 6.8, ordered 1u PRBCs.   02/24/2020 Day +12 Verenice AutoSCT for MM. ANC pending. She had diarrhea yesterday x2 yesterday.  Nausea controlled and no vomiting now. Feeling better today. She is working with PT 2x week.   02/25/2020 Day +13 Verenice AutoSCT for MM. ANC is 1500. She is doing well, nausea is better. Probably DC tomorrow. She lives locally.   2/2/6/2020 Day +14 Verenice AutoSCT for MM. Pt doing well without nausea and stools are soft. Plan for d/c later today.     Consults (From admission, onward)        Status Ordering Provider     Inpatient consult to General Surgery  Once     Provider:  (Not yet assigned)    Completed SHAZIA LEDESMA     Inpatient consult to Registered Dietitian/Nutritionist  Once     Provider:  (Not yet assigned)    Completed ANKUR BENOIT          Significant Diagnostic Studies: Labs:   CMP   Recent Labs   Lab 02/25/20  0408 02/26/20  0347    140   K 3.6 3.8    107   CO2 26 26   GLU 78 80   BUN 3* 3*   CREATININE 0.7 0.7   CALCIUM 7.7* 7.9*   PROT 5.7* 6.0   ALBUMIN 3.2* 3.3*   BILITOT 0.5 0.4   ALKPHOS 52* 58   AST  10 13   ALT 7* 8*   ANIONGAP 6* 7*   ESTGFRAFRICA >60.0 >60.0   EGFRNONAA >60.0 >60.0   , CBC   Recent Labs   Lab 02/25/20  0408 02/26/20  0347   WBC 1.88* 3.55*   HGB 8.4* 8.4*   HCT 27.7* 27.7*   PLT 13* 16*    and All labs within the past 24 hours have been reviewed    Pending Diagnostic Studies:     None        Final Active Diagnoses:    Diagnosis Date Noted POA    PRINCIPAL PROBLEM:  Status post autologous bone marrow transplant [Z94.81]  Not Applicable    Diarrhea [R19.7] 02/21/2020 No    Chemotherapy-induced nausea [R11.0, T45.1X5A] 02/18/2020 No    Pancytopenia due to antineoplastic chemotherapy [D61.810, T45.1X5A] 02/10/2020 Yes    Drug-induced polyneuropathy [G62.0] 02/10/2020 Yes    Essential hypertension [I10] 02/10/2020 Yes    Type 2 diabetes mellitus without complication, without long-term current use of insulin [E11.9] 02/10/2020 Yes    Interstitial lung disease [J84.9] 11/04/2019 Yes    Multiple myeloma in remission [C90.01] 10/03/2019 Yes    Recurrent major depressive disorder, in full remission [F33.42] 02/13/2017 Yes    Liver mass [R16.0] 04/07/2016 Yes    History of CVA with residual deficit [I69.30] 11/06/2015 Not Applicable    Hyperlipemia [E78.5] 10/14/2013 Yes    Personal history of malignant neoplasm of breast [Z85.3] 05/21/2012 Not Applicable      Problems Resolved During this Admission:      Discharged Condition: good    Disposition: Home or Self Care    Follow Up:    Patient Instructions:   No discharge procedures on file.  Medications:  Reconciled Home Medications:      Medication List      START taking these medications    calcium-vitamin D3 500 mg(1,250mg) -200 unit per tablet  Commonly known as:  OS- + D3  Take 1 tablet by mouth 2 (two) times daily.     evolocumab 140 mg/mL Pnij  Commonly known as:  Repatha SureClick  Inject 1 ml into the skin every 2 wks     k phos di & mono-sod phos mono 250 mg Tab  Commonly known as:  K-PHOS-NEUTRAL  Take 1 tablet by mouth 2  (two) times daily.     magnesium oxide 400 mg (241.3 mg magnesium) tablet  Commonly known as:  MAG-OX  Take 1 tablet (400 mg total) by mouth 2 (two) times daily.        CHANGE how you take these medications    acyclovir 800 MG Tab  Commonly known as:  ZOVIRAX  Take 1 tablet (800 mg total) by mouth 2 (two) times daily.  What changed:    · medication strength  · how much to take     gabapentin 300 MG capsule  Commonly known as:  NEURONTIN  Take 1 capsule (300mg) by mouth every morning and 2 capsules (600mg) every evening.  What changed:    · how much to take  · how to take this  · when to take this  · additional instructions     vitamin D 1000 units Tab  Commonly known as:  VITAMIN D3  Take 1 tablet (1,000 Units total) by mouth nightly.  What changed:  how much to take        CONTINUE taking these medications    amLODIPine 5 MG tablet  Commonly known as:  NORVASC  TAKE 1 TABLET BY MOUTH EVERY DAY     ferrous gluconate 324 mg (37.5 mg iron) Tab tablet  TAKE 1 TABLET BY MOUTH 2 (TWO) TIMES DAILY WITH MEALS.     loratadine 10 mg tablet  Commonly known as:  CLARITIN  Take 10 mg by mouth nightly.     omeprazole 40 MG capsule  Commonly known as:  PRILOSEC  TAKE 1 CAPSULE BY MOUTH EVERY DAY     ondansetron 4 MG tablet  Commonly known as:  ZOFRAN  Take 1 tablet (4 mg total) by mouth every 6 (six) hours as needed for Nausea.     sertraline 50 MG tablet  Commonly known as:  ZOLOFT  TAKE 1 TABLET (50 MG TOTAL) BY MOUTH ONCE DAILY.     traMADol 50 mg tablet  Commonly known as:  ULTRAM  Take 1 tablet (50 mg total) by mouth 2 (two) times daily as needed for Pain.        STOP taking these medications    acetaminophen 650 MG Tbsr  Commonly known as:  TYLENOL     allopurinoL 300 MG tablet  Commonly known as:  ZYLOPRIM     aspirin 81 mg Tab     bisacodyL 5 mg EC tablet  Commonly known as:  DULCOLAX     GaviLyte-G 236-22.74-6.74 -5.86 gram suspension  Generic drug:  polyethylene glycol     HYDROcodone-acetaminophen 5-325 mg per  tablet  Commonly known as:  NORCO     ondansetron 8 MG Tbdl  Commonly known as:  ZOFRAN-ODT     Praluent Pen 75 mg/mL Pnij  Generic drug:  alirocumab     Revlimid 25 mg Cap  Generic drug:  lenalidomide            Genny Napoles MD   Hematology/Oncology Fellow, PGY V  Bone Marrow Transplant  [facility]

## 2020-02-26 NOTE — CONSULTS
GENERAL SURGERY PROGRESS NOTE    Patient with tunneled central venous catheter in place for stem cell transplant. Therapy has been completed. Catheter is no longer necessary. Called about catheter removal. Plt 16. Transfuse Plt.    Catheter removed without issue and pressure held. Dressing placed over catheter exit site. Instructed patient she can remove dressing tomorrow and shower like normal.   Please call with any issues.     Hugo Dwyer MD  General Surgery PGYIII  [phone]

## 2020-02-26 NOTE — PROGRESS NOTES
[facility]  Hematology  Bone Marrow Transplant  Progress Note    Patient Name: Shelby Thompson  Admission Date: 2/10/2020  Hospital Length of Stay: 16 days  Code Status: Full Code    Subjective:     Interval History: Day +14 Verenice AutoSCT for MM. Pt doing well without nausea and stools are soft. Plan for d/c later today.   Objective:     Vital Signs (Most Recent):  Temp: 98.5 °F (36.9 °C) (02/25/20 1500)  Pulse: 78 (02/25/20 1500)  Resp: 16 (02/25/20 0715)  BP: 124/60 (02/25/20 1500)  SpO2: 96 % (02/25/20 0715) Vital Signs (24h Range):  Temp:  [97.8 °F (36.6 °C)-98.6 °F (37 °C)] 98.5 °F (36.9 °C)  Pulse:  [71-87] 78  Resp:  [14-16] 16  SpO2:  [87 %-97 %] 96 %  BP: (116-129)/(58-74) 124/60     Weight: 73 kg (160 lb 15 oz)  Body mass index is 28.51 kg/m².  Body surface area is 1.8 meters squared.      Intake/Output - Last 3 Shifts       02/23 0700 - 02/24 0659 02/24 0700 - 02/25 0659 02/25 0700 - 02/26 0659    P.O. 740 1220     I.V. (mL/kg) 2214.7 (31) 2305 (31.6)     Blood 640      Total Intake(mL/kg) 3594.7 (50.3) 3525 (48.3)     Urine (mL/kg/hr) 2700 (1.6) 1275 (0.7) 800 (1.2)    Emesis/NG output  400     Stool 0 0 0    Total Output 2700 1675 800    Net +894.7 +1850 -800           Urine Occurrence 1 x 2 x     Stool Occurrence 2 x 2 x 2 x          Physical Exam   Constitutional: She is oriented to person, place, and time. Vital signs are normal. She appears well-developed and well-nourished. She is cooperative.   HENT:   Head: Normocephalic.   Eyes: Pupils are equal, round, and reactive to light. Conjunctivae, EOM and lids are normal.   Neck: Trachea normal and normal range of motion.   Cardiovascular: Normal rate, regular rhythm, S1 normal, S2 normal and normal heart sounds.   Pulmonary/Chest: Effort normal and breath sounds normal.   Abdominal: Soft. Normal appearance and bowel sounds are normal.   Musculoskeletal: Normal range of motion.   Chronic tremor to RUE 2/2 stroke   Neurological: She  is alert and oriented to person, place, and time. Coordination and gait normal.   Skin: Skin is dry and intact. She is not diaphoretic. No pallor.   vascath to chest wall c/d/i   Psychiatric: She has a normal mood and affect. Her speech is normal and behavior is normal. Judgment and thought content normal. Cognition and memory are normal.       Significant Labs:   CBC:     Recent Labs   Lab 02/26/20  0347   WBC 3.55*   RBC 3.26*   HGB 8.4*   HCT 27.7*   PLT 16*   MCV 85   MCH 25.8*   MCHC 30.3*     Recent Labs   Lab 02/26/20  0347   CALCIUM 7.9*   PROT 6.0      K 3.8   CO2 26      BUN 3*   CREATININE 0.7   ALKPHOS 58   ALT 8*   AST 13   BILITOT 0.4       Diagnostic Results:  I have reviewed all pertinent imaging results/findings within the past 24 hours.    Assessment/Plan:     * Status post autologous bone marrow transplant  -Today is day +14  -received Melphalan 140mg/m2 without difficulty  -stem cell infusion was on 2/12 without issue, received 3 bags (2.85 x 10^6) CD 34 stem cells  -some expected fatigue, poor appetite, not watery diarrhea, and nausea which is improved with scheduled zofran and PRN Compazine    Regimen as below:  Transplant Day -2: IV fluids  Transplant Day -1 through Day 0: Antiemetics   Transplant Day -1: Melphalan   Transplant Day 0: Stem Cell Infusion   Transplant Day +7: Growth factor begins    Diarrhea  - C diff neg   - on immodium PRN     Chemotherapy-induced nausea  - continue scheduled Zofran  - PRN compazine and Ativan    Type 2 diabetes mellitus without complication, without long-term current use of insulin  -monitor closely while inpatient; not on active medication     Essential hypertension  -continue norvasc       Drug-induced polyneuropathy  -pt has had numbness/tingling to hands and BUE tremors post CVA, for which she is on gabapentin 600 mg BID  - prn tramadol     Pancytopenia due to antineoplastic chemotherapy  -hx of JESSE; received single dose feraheme pre  transplant  -colonoscopy okay for transplant  -will monitor daily CBC inpatient  -transfuse for Hgb <7, Plt <10K or bleeding      Interstitial lung disease  - had abnormal PFTs during transplant evaluation 10/2019; seen by pulm; no significant desaturation on 6MWT; okay per transplant per pulm     Multiple myeloma in remission  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) High Risk disease; complex karyotype by CG; , t(14;20), monosomy 13 on FISH  -She has achieved and tolerated well VRd x completing 7, 28 day cycles and achieving stringent CR as of Dec 2019 marrow/biochemical studies   -adequate response to proceed with autoSCT  -she has PS 1-2 and multiple abnormalities in her pre transplant eval that have been cleared by pulmonology, cardiology, gynecology and are not prohibitive to proceed with transplant  -she has JESSE - sp  one dose of iv feraheme with response  will need reassessment by GI; if colonosocpy normal  proceed with transplant   -no further velcade maintenance pre transplant, last dose was 12/24/19  -plan for reduced dose clifton 140mg/m2 in light of age    Recurrent major depressive disorder, in full remission  -continue zoloft     Liver mass  -vascular per recent MRI in 09/2019 with no changes; seen by hepatology pre-transplant and signed off; recommend repeat MRI in 6 months     History of CVA with residual deficit  -one in 2008, one in 2011  -has been cleared by cardiology for transplant     Hyperlipemia  -on praluent, PCSK9 inhibitor; holding during admission     Personal history of malignant neoplasm of breast  L breast cancer DCIS stage 0  -s/p lumpectomy and radiation, no endocrine tx due to CVA         VTE Risk Mitigation (From admission, onward)         Ordered     heparin (porcine) injection 1,600 Units  As needed (PRN)      02/10/20 2141     IP VTE HIGH RISK PATIENT  Once      02/10/20 1738                Discussed with Dr. Whitney Royal  MD Doyn  Hematology/Oncology Fellow, PGY V  Bone Marrow Transplant  [facility]

## 2020-02-26 NOTE — NURSING
Patient prepared for discharge. AVS reviewed with patient and friend. Vas cath removed by general surgery at bedside. Site is clean and dry . Instructions on care are  given to patient. Prescriptions delivered from pharmacy and pharmacist reviewed with patient . NP reviewed directions for home care with patient at her bedside. Friend is her to drive her home Escort is to wheel her out . VSS stable upon discharge.

## 2020-02-26 NOTE — PLAN OF CARE
Future Appointments   Date Time Provider Department Center   2/28/2020  9:40 AM LAB, HEMONC CANCER BLDG [facility]   3/4/2020  7:20 AM LAB, HEMONC CANCER BLDG [facility]   3/4/2020  8:30 AM Kathleen Kimball NP [facility] B[facility]   3/26/2020  7:45 AM [facility]     Digna Mishra, RN, BSN, CM  Utilization Management  [facility]

## 2020-02-27 PROCEDURE — G0180 MD CERTIFICATION HHA PATIENT: HCPCS | Mod: ,,, | Performed by: INTERNAL MEDICINE

## 2020-02-27 PROCEDURE — G0180 PR HOME HEALTH MD CERTIFICATION: ICD-10-PCS | Mod: ,,, | Performed by: INTERNAL MEDICINE

## 2020-02-28 ENCOUNTER — TELEPHONE (OUTPATIENT)
Dept: HEMATOLOGY/ONCOLOGY | Facility: CLINIC | Age: 70
End: 2020-02-28

## 2020-02-28 ENCOUNTER — LAB VISIT (OUTPATIENT)
Dept: LAB | Facility: HOSPITAL | Age: 70
End: 2020-02-28
Payer: MEDICARE

## 2020-02-28 DIAGNOSIS — C90.00 MULTIPLE MYELOMA NOT HAVING ACHIEVED REMISSION: ICD-10-CM

## 2020-02-28 DIAGNOSIS — D49.2 BONE NEOPLASM: ICD-10-CM

## 2020-02-28 LAB
ABO + RH BLD: NORMAL
ALBUMIN SERPL BCP-MCNC: 3.9 G/DL (ref 3.5–5.2)
ALP SERPL-CCNC: 76 U/L (ref 55–135)
ALT SERPL W/O P-5'-P-CCNC: 9 U/L (ref 10–44)
ANION GAP SERPL CALC-SCNC: 11 MMOL/L (ref 8–16)
ANISOCYTOSIS BLD QL SMEAR: SLIGHT
AST SERPL-CCNC: 13 U/L (ref 10–40)
BASOPHILS # BLD AUTO: 0.03 K/UL (ref 0–0.2)
BASOPHILS NFR BLD: 1 % (ref 0–1.9)
BILIRUB SERPL-MCNC: 0.3 MG/DL (ref 0.1–1)
BLD GP AB SCN CELLS X3 SERPL QL: NORMAL
BUN SERPL-MCNC: 5 MG/DL (ref 8–23)
CALCIUM SERPL-MCNC: 9.1 MG/DL (ref 8.7–10.5)
CHLORIDE SERPL-SCNC: 105 MMOL/L (ref 95–110)
CO2 SERPL-SCNC: 28 MMOL/L (ref 23–29)
CREAT SERPL-MCNC: 0.9 MG/DL (ref 0.5–1.4)
DIFFERENTIAL METHOD: ABNORMAL
EOSINOPHIL # BLD AUTO: 0 K/UL (ref 0–0.5)
EOSINOPHIL NFR BLD: 0 % (ref 0–8)
ERYTHROCYTE [DISTWIDTH] IN BLOOD BY AUTOMATED COUNT: 20.4 % (ref 11.5–14.5)
EST. GFR  (AFRICAN AMERICAN): >60 ML/MIN/1.73 M^2
EST. GFR  (NON AFRICAN AMERICAN): >60 ML/MIN/1.73 M^2
GLUCOSE SERPL-MCNC: 112 MG/DL (ref 70–110)
HCT VFR BLD AUTO: 33.7 % (ref 37–48.5)
HGB BLD-MCNC: 10.1 G/DL (ref 12–16)
HYPOCHROMIA BLD QL SMEAR: ABNORMAL
IMM GRANULOCYTES # BLD AUTO: 0.14 K/UL (ref 0–0.04)
IMM GRANULOCYTES NFR BLD AUTO: 4.8 % (ref 0–0.5)
LYMPHOCYTES # BLD AUTO: 0.3 K/UL (ref 1–4.8)
LYMPHOCYTES NFR BLD: 11.4 % (ref 18–48)
MAGNESIUM SERPL-MCNC: 1.7 MG/DL (ref 1.6–2.6)
MCH RBC QN AUTO: 25.6 PG (ref 27–31)
MCHC RBC AUTO-ENTMCNC: 30 G/DL (ref 32–36)
MCV RBC AUTO: 86 FL (ref 82–98)
MONOCYTES # BLD AUTO: 0.8 K/UL (ref 0.3–1)
MONOCYTES NFR BLD: 28.3 % (ref 4–15)
NEUTROPHILS # BLD AUTO: 1.6 K/UL (ref 1.8–7.7)
NEUTROPHILS NFR BLD: 54.5 % (ref 38–73)
NRBC BLD-RTO: 1 /100 WBC
OVALOCYTES BLD QL SMEAR: ABNORMAL
PHOSPHATE SERPL-MCNC: 3.7 MG/DL (ref 2.7–4.5)
PLATELET # BLD AUTO: 81 K/UL (ref 150–350)
PMV BLD AUTO: 9.8 FL (ref 9.2–12.9)
POIKILOCYTOSIS BLD QL SMEAR: SLIGHT
POLYCHROMASIA BLD QL SMEAR: ABNORMAL
POTASSIUM SERPL-SCNC: 3.9 MMOL/L (ref 3.5–5.1)
PROT SERPL-MCNC: 7.1 G/DL (ref 6–8.4)
RBC # BLD AUTO: 3.94 M/UL (ref 4–5.4)
SODIUM SERPL-SCNC: 144 MMOL/L (ref 136–145)
WBC # BLD AUTO: 2.9 K/UL (ref 3.9–12.7)

## 2020-02-28 PROCEDURE — 83735 ASSAY OF MAGNESIUM: CPT | Mod: HCNC

## 2020-02-28 PROCEDURE — 36415 COLL VENOUS BLD VENIPUNCTURE: CPT | Mod: HCNC

## 2020-02-28 PROCEDURE — 80053 COMPREHEN METABOLIC PANEL: CPT | Mod: HCNC

## 2020-02-28 PROCEDURE — 86850 RBC ANTIBODY SCREEN: CPT | Mod: HCNC

## 2020-02-28 PROCEDURE — 85025 COMPLETE CBC W/AUTO DIFF WBC: CPT | Mod: HCNC

## 2020-02-28 PROCEDURE — 84100 ASSAY OF PHOSPHORUS: CPT | Mod: HCNC

## 2020-02-28 NOTE — TELEPHONE ENCOUNTER
"----- Message from Carol Mendosa sent at 2/28/2020  8:11 AM CST -----  Contact: [facility]  Consult/Advisory:    Name Of Caller: Ashley harper/[facility]   Provider Name: Dr. Olson  Does patient feel the need to be seen today? No    Contact Preference?: [phone]  What is the nature of the call?:  Received  PT evaluation patient is doing well  will be working on endurance building and will send patient's the plan of care    Additional Notes:  "Thank you for all that you do for our patients'"      "

## 2020-03-04 ENCOUNTER — LAB VISIT (OUTPATIENT)
Dept: LAB | Facility: HOSPITAL | Age: 70
End: 2020-03-04
Payer: MEDICARE

## 2020-03-04 ENCOUNTER — OFFICE VISIT (OUTPATIENT)
Dept: HEMATOLOGY/ONCOLOGY | Facility: CLINIC | Age: 70
End: 2020-03-04
Payer: MEDICARE

## 2020-03-04 VITALS
RESPIRATION RATE: 16 BRPM | SYSTOLIC BLOOD PRESSURE: 109 MMHG | BODY MASS INDEX: 27.03 KG/M2 | OXYGEN SATURATION: 98 % | HEART RATE: 84 BPM | DIASTOLIC BLOOD PRESSURE: 72 MMHG | WEIGHT: 152.56 LBS | TEMPERATURE: 98 F | HEIGHT: 63 IN

## 2020-03-04 DIAGNOSIS — I69.30 HISTORY OF CVA WITH RESIDUAL DEFICIT: ICD-10-CM

## 2020-03-04 DIAGNOSIS — Z94.81 STATUS POST AUTOLOGOUS BONE MARROW TRANSPLANT: Primary | ICD-10-CM

## 2020-03-04 DIAGNOSIS — Z85.3 HISTORY OF BREAST CANCER: ICD-10-CM

## 2020-03-04 DIAGNOSIS — D64.81 ANEMIA DUE TO CHEMOTHERAPY: ICD-10-CM

## 2020-03-04 DIAGNOSIS — J84.9 INTERSTITIAL LUNG DISEASE: ICD-10-CM

## 2020-03-04 DIAGNOSIS — F33.42 RECURRENT MAJOR DEPRESSIVE DISORDER, IN FULL REMISSION: ICD-10-CM

## 2020-03-04 DIAGNOSIS — T45.1X5A ANEMIA DUE TO CHEMOTHERAPY: ICD-10-CM

## 2020-03-04 DIAGNOSIS — G62.0 DRUG-INDUCED POLYNEUROPATHY: ICD-10-CM

## 2020-03-04 DIAGNOSIS — C90.00 MULTIPLE MYELOMA NOT HAVING ACHIEVED REMISSION: ICD-10-CM

## 2020-03-04 DIAGNOSIS — C90.01 MULTIPLE MYELOMA IN REMISSION: ICD-10-CM

## 2020-03-04 DIAGNOSIS — I10 ESSENTIAL HYPERTENSION: ICD-10-CM

## 2020-03-04 DIAGNOSIS — E11.9 TYPE 2 DIABETES MELLITUS WITHOUT COMPLICATION, WITHOUT LONG-TERM CURRENT USE OF INSULIN: ICD-10-CM

## 2020-03-04 LAB
ABO + RH BLD: NORMAL
ALBUMIN SERPL BCP-MCNC: 3.9 G/DL (ref 3.5–5.2)
ALP SERPL-CCNC: 67 U/L (ref 55–135)
ALT SERPL W/O P-5'-P-CCNC: 9 U/L (ref 10–44)
ANION GAP SERPL CALC-SCNC: 9 MMOL/L (ref 8–16)
AST SERPL-CCNC: 15 U/L (ref 10–40)
BASOPHILS # BLD AUTO: 0.03 K/UL (ref 0–0.2)
BASOPHILS NFR BLD: 1.1 % (ref 0–1.9)
BILIRUB SERPL-MCNC: 0.3 MG/DL (ref 0.1–1)
BLD GP AB SCN CELLS X3 SERPL QL: NORMAL
BUN SERPL-MCNC: 11 MG/DL (ref 8–23)
CALCIUM SERPL-MCNC: 9 MG/DL (ref 8.7–10.5)
CHLORIDE SERPL-SCNC: 107 MMOL/L (ref 95–110)
CO2 SERPL-SCNC: 27 MMOL/L (ref 23–29)
CREAT SERPL-MCNC: 0.9 MG/DL (ref 0.5–1.4)
DIFFERENTIAL METHOD: ABNORMAL
EOSINOPHIL # BLD AUTO: 0 K/UL (ref 0–0.5)
EOSINOPHIL NFR BLD: 0 % (ref 0–8)
ERYTHROCYTE [DISTWIDTH] IN BLOOD BY AUTOMATED COUNT: 19.9 % (ref 11.5–14.5)
EST. GFR  (AFRICAN AMERICAN): >60 ML/MIN/1.73 M^2
EST. GFR  (NON AFRICAN AMERICAN): >60 ML/MIN/1.73 M^2
GLUCOSE SERPL-MCNC: 103 MG/DL (ref 70–110)
HCT VFR BLD AUTO: 36.2 % (ref 37–48.5)
HGB BLD-MCNC: 10.7 G/DL (ref 12–16)
IMM GRANULOCYTES # BLD AUTO: 0.01 K/UL (ref 0–0.04)
IMM GRANULOCYTES NFR BLD AUTO: 0.4 % (ref 0–0.5)
LYMPHOCYTES # BLD AUTO: 0.5 K/UL (ref 1–4.8)
LYMPHOCYTES NFR BLD: 18.6 % (ref 18–48)
MAGNESIUM SERPL-MCNC: 2.1 MG/DL (ref 1.6–2.6)
MCH RBC QN AUTO: 25.4 PG (ref 27–31)
MCHC RBC AUTO-ENTMCNC: 29.6 G/DL (ref 32–36)
MCV RBC AUTO: 86 FL (ref 82–98)
MONOCYTES # BLD AUTO: 0.8 K/UL (ref 0.3–1)
MONOCYTES NFR BLD: 28 % (ref 4–15)
NEUTROPHILS # BLD AUTO: 1.5 K/UL (ref 1.8–7.7)
NEUTROPHILS NFR BLD: 51.9 % (ref 38–73)
NRBC BLD-RTO: 0 /100 WBC
PHOSPHATE SERPL-MCNC: 3.6 MG/DL (ref 2.7–4.5)
PLATELET # BLD AUTO: 177 K/UL (ref 150–350)
PMV BLD AUTO: 10 FL (ref 9.2–12.9)
POTASSIUM SERPL-SCNC: 3.4 MMOL/L (ref 3.5–5.1)
PROT SERPL-MCNC: 7.2 G/DL (ref 6–8.4)
RBC # BLD AUTO: 4.22 M/UL (ref 4–5.4)
SODIUM SERPL-SCNC: 143 MMOL/L (ref 136–145)
WBC # BLD AUTO: 2.79 K/UL (ref 3.9–12.7)

## 2020-03-04 PROCEDURE — 99499 RISK ADDL DX/OHS AUDIT: ICD-10-PCS | Mod: HCNC,BMT,S$GLB, | Performed by: NURSE PRACTITIONER

## 2020-03-04 PROCEDURE — 99499 UNLISTED E&M SERVICE: CPT | Mod: HCNC,BMT,S$GLB, | Performed by: NURSE PRACTITIONER

## 2020-03-04 PROCEDURE — 3074F PR MOST RECENT SYSTOLIC BLOOD PRESSURE < 130 MM HG: ICD-10-PCS | Mod: HCNC,BMT,CPTII,S$GLB | Performed by: NURSE PRACTITIONER

## 2020-03-04 PROCEDURE — 80053 COMPREHEN METABOLIC PANEL: CPT | Mod: HCNC

## 2020-03-04 PROCEDURE — 99999 PR PBB SHADOW E&M-EST. PATIENT-LVL V: CPT | Mod: PBBFAC,HCNC,BMT, | Performed by: NURSE PRACTITIONER

## 2020-03-04 PROCEDURE — 3074F SYST BP LT 130 MM HG: CPT | Mod: HCNC,BMT,CPTII,S$GLB | Performed by: NURSE PRACTITIONER

## 2020-03-04 PROCEDURE — 3078F DIAST BP <80 MM HG: CPT | Mod: HCNC,BMT,CPTII,S$GLB | Performed by: NURSE PRACTITIONER

## 2020-03-04 PROCEDURE — 99215 OFFICE O/P EST HI 40 MIN: CPT | Mod: HCNC,BMT,S$GLB, | Performed by: NURSE PRACTITIONER

## 2020-03-04 PROCEDURE — 99215 PR OFFICE/OUTPT VISIT, EST, LEVL V, 40-54 MIN: ICD-10-PCS | Mod: HCNC,BMT,S$GLB, | Performed by: NURSE PRACTITIONER

## 2020-03-04 PROCEDURE — 1159F MED LIST DOCD IN RCRD: CPT | Mod: HCNC,BMT,S$GLB, | Performed by: NURSE PRACTITIONER

## 2020-03-04 PROCEDURE — 99999 PR PBB SHADOW E&M-EST. PATIENT-LVL V: ICD-10-PCS | Mod: PBBFAC,HCNC,BMT, | Performed by: NURSE PRACTITIONER

## 2020-03-04 PROCEDURE — 1101F PT FALLS ASSESS-DOCD LE1/YR: CPT | Mod: HCNC,BMT,CPTII,S$GLB | Performed by: NURSE PRACTITIONER

## 2020-03-04 PROCEDURE — 3051F PR MOST RECENT HEMOGLOBIN A1C LEVEL 7.0 - < 8.0%: ICD-10-PCS | Mod: HCNC,BMT,CPTII,S$GLB | Performed by: NURSE PRACTITIONER

## 2020-03-04 PROCEDURE — 1126F PR PAIN SEVERITY QUANTIFIED, NO PAIN PRESENT: ICD-10-PCS | Mod: HCNC,BMT,S$GLB, | Performed by: NURSE PRACTITIONER

## 2020-03-04 PROCEDURE — 1101F PR PT FALLS ASSESS DOC 0-1 FALLS W/OUT INJ PAST YR: ICD-10-PCS | Mod: HCNC,BMT,CPTII,S$GLB | Performed by: NURSE PRACTITIONER

## 2020-03-04 PROCEDURE — 1126F AMNT PAIN NOTED NONE PRSNT: CPT | Mod: HCNC,BMT,S$GLB, | Performed by: NURSE PRACTITIONER

## 2020-03-04 PROCEDURE — 3051F HG A1C>EQUAL 7.0%<8.0%: CPT | Mod: HCNC,BMT,CPTII,S$GLB | Performed by: NURSE PRACTITIONER

## 2020-03-04 PROCEDURE — 3078F PR MOST RECENT DIASTOLIC BLOOD PRESSURE < 80 MM HG: ICD-10-PCS | Mod: HCNC,BMT,CPTII,S$GLB | Performed by: NURSE PRACTITIONER

## 2020-03-04 PROCEDURE — 86900 BLOOD TYPING SEROLOGIC ABO: CPT | Mod: HCNC

## 2020-03-04 PROCEDURE — 85025 COMPLETE CBC W/AUTO DIFF WBC: CPT | Mod: HCNC

## 2020-03-04 PROCEDURE — 83735 ASSAY OF MAGNESIUM: CPT | Mod: HCNC

## 2020-03-04 PROCEDURE — 1159F PR MEDICATION LIST DOCUMENTED IN MEDICAL RECORD: ICD-10-PCS | Mod: HCNC,BMT,S$GLB, | Performed by: NURSE PRACTITIONER

## 2020-03-04 PROCEDURE — 84100 ASSAY OF PHOSPHORUS: CPT | Mod: HCNC

## 2020-03-04 NOTE — PROGRESS NOTES
PATIENT: Shelby Thompson  MRN: [id number]  DATE: 3/4/2020    Chief Complaint: Hospital Follow Up and Multiple Myeloma      Oncologic History:     Multiple Myeloma- presented w CRAB criteria (Hgb 7.1, hypercalcemia, renal function - Cr of 2.7, T7 vertebral fracture)  IgG Kappa, RISS Stage III (beta-2 micro globulin of 17.5 and 14:20 translocation) High Risk disease  She has achieved and tolerated well VRd x completing 7, 28 day cycles and achieving deep VGPR'  last dose velcade 10/31/19.    Extensive PMH as below.    2 prior CVAs (one in 2008, one in 2011)  L breast cancer DCIS stage 0 (s/p lumpectomy and radiation, no endocrine tx due to CVA)  HTN  DM II  Neuropathy, b/l hands  Prior smoker, quit in 2011  Hepatic lesions - vascular per recent MRI in 09 /2019 with no changes; will confirm no further rascon needed from hep  Statin Intolerance - on praluent, PCSK9 inhibitor   HFpEF - EF 55%, diastolic dysfunction  Anxiety/Depression    ADMISSION SUMMARY: 2/10-2/26/2020  Admitted 2/10, tolerated chemo and transplant well. Engrafted on day +12. Remained afebrile throughout hospital stay and no mucositis. Experienced expected side effects of nausea and diarrhea controlled with antiemetics and Imodium. Discharged home with fernando PT/OT     Today: Ms. Thompson is a 69 y.o. female patient of Dr. Bro who presents today for hospital follow-up post Verenice 140 Auto SCT (see admission summary above). She is day +21 today. She is feeling well overall today. She denies  nausea or vomiting, reports diarrhea usually about 3 a day but small amounts. She is currently not taking Imodium she reports poor appetite but is attempting to eat more. Denies fevers, chills, chest pain, sob, Continues with chronic neuropathy.      Past Medical History:   Past Medical History:   Diagnosis Date    Acute respiratory failure with hypoxia 4/30/2019    FUENTES (acute kidney injury) 5/1/2019    Allergy     Anemia     Aortic aneurysm     Breast cancer  10/2011    left breast Stage 0 DCIS    Chronic diastolic congestive heart failure 11/6/2015    Colon polyp     GERD (gastroesophageal reflux disease)     Hiatal hernia     History of colonic polyps     Hx of psychiatric care     Zoloft    HX: breast cancer     Hyperlipemia     Hypertension     ICH (intracerebral hemorrhage)     Major depressive disorder, single episode, mild 6/23/2016    Nuclear sclerosis 7/21/2014    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 7/21/2014    PAD (peripheral artery disease) 11/6/2015    Psychiatric problem     Statin-induced rhabdomyolysis     4/2015     Stroke 4/2011    Type 2 diabetes mellitus without complication, without long-term current use of insulin 2/10/2020    Walker as ambulation aid        Past Surgical HIstory:   Past Surgical History:   Procedure Laterality Date    APPENDECTOMY      BONE MARROW BIOPSY Left 10/3/2019    Procedure: Biopsy-bone marrow;  Surgeon: Jere Tineo MD;  Location: [facility] OR [address] [place] [address];  Service: Oncology;  Laterality: Left;    BREAST BIOPSY Left 10/2011    BREAST LUMPECTOMY Left 2011    DCIS    COLONOSCOPY      COLONOSCOPY N/A 1/9/2020    Procedure: COLONOSCOPY;  Surgeon: Quang De La Cruz MD;  Location: [facility] ([address] [place] [address]);  Service: Endoscopy;  Laterality: N/A;    CYST REMOVAL      back    ESOPHAGOGASTRODUODENOSCOPY  07/2016    duodenal angioectasia    ESOPHAGOGASTRODUODENOSCOPY N/A 1/9/2020    Procedure: EGD (ESOPHAGOGASTRODUODENOSCOPY);  Surgeon: Quang De La Cruz MD;  Location: [facility] ([facility]);  Service: Endoscopy;  Laterality: N/A;    FOOT FRACTURE SURGERY  10/2012    right foot, with R hallux valgus repair    FRACTURE SURGERY Right     broken toe repiar    HEMORRHOID SURGERY      LIVER BIOPSY  04/2015    essentially normal, no fibrosis    TUBAL LIGATION      UPPER GASTROINTESTINAL ENDOSCOPY         Family History:   Family History   Problem Relation Age of Onset    No Known  Problems Sister     Cancer Sister 63        Lung Cancer    No Known Problems Mother     Cancer Father     No Known Problems Brother     Hypertension Daughter     Fibroids Daughter         uterine    Hypertension Son     Breast cancer Sister 62    No Known Problems Brother     No Known Problems Maternal Aunt     No Known Problems Maternal Uncle     No Known Problems Paternal Aunt     No Known Problems Paternal Uncle     Hypertension Maternal Grandmother     No Known Problems Maternal Grandfather     No Known Problems Paternal Grandmother     No Known Problems Paternal Grandfather     Ovarian cancer Neg Hx     Colon cancer Neg Hx     Tremor Neg Hx     Amblyopia Neg Hx     Blindness Neg Hx     Cataracts Neg Hx     Diabetes Neg Hx     Glaucoma Neg Hx     Macular degeneration Neg Hx     Retinal detachment Neg Hx     Strabismus Neg Hx     Stroke Neg Hx     Thyroid disease Neg Hx     Esophageal cancer Neg Hx     Rectal cancer Neg Hx     Stomach cancer Neg Hx     Ulcerative colitis Neg Hx     Crohn's disease Neg Hx     Irritable bowel syndrome Neg Hx     Celiac disease Neg Hx        Social History:  reports that she quit smoking about 8 years ago. Her smoking use included cigarettes. She has a 10.00 pack-year smoking history. She has never used smokeless tobacco. She reports that she drank alcohol. She reports that she does not use drugs.    Allergies:  Review of patient's allergies indicates:   Allergen Reactions    Lipitor [atorvastatin]      Itching, elevated cpk, muscle aches, statin induced myositis       Medications:  Current Outpatient Medications   Medication Sig Dispense Refill    acyclovir (ZOVIRAX) 800 MG Tab Take 1 tablet (800 mg total) by mouth 2 (two) times daily. 60 tablet 11    amLODIPine (NORVASC) 5 MG tablet TAKE 1 TABLET BY MOUTH EVERY DAY 90 tablet 3    calcium-vitamin D3 (OS- + D3) 500 mg(1,250mg) -200 unit per tablet Take 1 tablet by mouth 2 (two) times  daily. 60 tablet 0    evolocumab (REPATHA SURECLICK) 140 mg/mL PnIj Inject 1 ml into the skin every 2 wks 6 mL 3    ferrous gluconate 324 mg (37.5 mg iron) Tab TAKE 1 TABLET BY MOUTH 2 (TWO) TIMES DAILY WITH MEALS. 180 tablet 0    gabapentin (NEURONTIN) 300 MG capsule Take 1 capsule (300mg) by mouth every morning and 2 capsules (600mg) every evening. 90 capsule 5    loratadine (CLARITIN) 10 mg tablet Take 10 mg by mouth nightly.      magnesium oxide (MAG-OX) 400 mg (241.3 mg magnesium) tablet Take 1 tablet (400 mg total) by mouth 2 (two) times daily. 60 tablet 2    omeprazole (PRILOSEC) 40 MG capsule TAKE 1 CAPSULE BY MOUTH EVERY DAY 90 capsule 3    sertraline (ZOLOFT) 50 MG tablet TAKE 1 TABLET (50 MG TOTAL) BY MOUTH ONCE DAILY. 90 tablet 3    traMADol (ULTRAM) 50 mg tablet Take 1 tablet (50 mg total) by mouth 2 (two) times daily as needed for Pain. 60 tablet 3    ondansetron (ZOFRAN) 4 MG tablet Take 1 tablet (4 mg total) by mouth every 6 (six) hours as needed for Nausea. (Patient not taking: Reported on 3/4/2020) 30 tablet 1     No current facility-administered medications for this visit.        Review of Systems   Constitutional: Negative.  Negative for fatigue and unexpected weight change.   HENT: Negative.    Eyes: Negative.    Respiratory: Negative for cough.    Cardiovascular: Negative for chest pain and palpitations.   Gastrointestinal: Positive for diarrhea. Negative for abdominal pain, blood in stool, nausea and vomiting.   Endocrine: Negative.    Genitourinary: Negative for difficulty urinating and dysuria.   Musculoskeletal: Positive for back pain and gait problem.   Skin: Negative for rash.   Allergic/Immunologic: Positive for immunocompromised state.   Neurological: Positive for tremors, weakness and numbness. Negative for headaches.   Hematological: Negative for adenopathy.       ECOG Performance Status: 1   Objective:      Vitals:   Vitals:    03/04/20 0844   BP: 109/72   Pulse: 84   Resp:  "16   Temp: 98.1 °F (36.7 °C)   TempSrc: Oral   SpO2: 98%   Weight: 69.2 kg (152 lb 8.9 oz)   Height: 5' 3" (1.6 m)       Physical Exam   Constitutional: She is oriented to person, place, and time. She appears well-developed and well-nourished. No distress.   HENT:   Head: Normocephalic and atraumatic.   Mouth/Throat: Oropharynx is clear and moist.   Eyes: Pupils are equal, round, and reactive to light. Conjunctivae and EOM are normal.   Neck: Normal range of motion. Neck supple.   Cardiovascular: Normal rate, regular rhythm and normal heart sounds. Exam reveals no gallop and no friction rub.   No murmur heard.  Pulmonary/Chest: Effort normal and breath sounds normal. No respiratory distress. She has no wheezes. She has no rales.   Abdominal: Soft. Bowel sounds are normal. She exhibits no distension. There is no tenderness. There is no guarding.   Musculoskeletal: Normal range of motion. She exhibits no edema.   Lymphadenopathy:     She has no cervical adenopathy.   Neurological: She is alert and oriented to person, place, and time. No cranial nerve deficit.   Intentional R hand tremor; RUE - 4/5 strength and RLE extremity also 4/5; full strengths on LUE and LLE   Skin: Skin is warm and dry. No rash noted.       Laboratory Data:  Lab Results   Component Value Date    WBC 2.84 (L) 10/15/2019    HGB 9.7 (L) 10/15/2019    HCT 34.5 (L) 10/15/2019    MCV 76 (L) 10/15/2019     10/15/2019       Gran # (ANC)   Date Value Ref Range Status   03/04/2020 1.5 (L) 1.8 - 7.7 K/uL Final     Gran%   Date Value Ref Range Status   03/04/2020 51.9 38.0 - 73.0 % Final     CMP  Sodium   Date Value Ref Range Status   03/04/2020 143 136 - 145 mmol/L Final     Potassium   Date Value Ref Range Status   03/04/2020 3.4 (L) 3.5 - 5.1 mmol/L Final     Chloride   Date Value Ref Range Status   03/04/2020 107 95 - 110 mmol/L Final     CO2   Date Value Ref Range Status   03/04/2020 27 23 - 29 mmol/L Final     Glucose   Date Value Ref Range " Status   03/04/2020 103 70 - 110 mg/dL Final     BUN, Bld   Date Value Ref Range Status   03/04/2020 11 8 - 23 mg/dL Final     Creatinine   Date Value Ref Range Status   03/04/2020 0.9 0.5 - 1.4 mg/dL Final     Calcium   Date Value Ref Range Status   03/04/2020 9.0 8.7 - 10.5 mg/dL Final     Total Protein   Date Value Ref Range Status   03/04/2020 7.2 6.0 - 8.4 g/dL Final     Albumin   Date Value Ref Range Status   03/04/2020 3.9 3.5 - 5.2 g/dL Final     Total Bilirubin   Date Value Ref Range Status   03/04/2020 0.3 0.1 - 1.0 mg/dL Final     Comment:     For infants and newborns, interpretation of results should be based  on gestational age, weight and in agreement with clinical  observations.  Premature Infant recommended reference ranges:  Up to 24 hours.............<8.0 mg/dL  Up to 48 hours............<12.0 mg/dL  3-5 days..................<15.0 mg/dL  6-29 days.................<15.0 mg/dL       Alkaline Phosphatase   Date Value Ref Range Status   03/04/2020 67 55 - 135 U/L Final     AST   Date Value Ref Range Status   03/04/2020 15 10 - 40 U/L Final     ALT   Date Value Ref Range Status   03/04/2020 9 (L) 10 - 44 U/L Final     Anion Gap   Date Value Ref Range Status   03/04/2020 9 8 - 16 mmol/L Final     eGFR if    Date Value Ref Range Status   03/04/2020 >60.0 >60 mL/min/1.73 m^2 Final     eGFR if non    Date Value Ref Range Status   03/04/2020 >60.0 >60 mL/min/1.73 m^2 Final     Comment:     Calculation used to obtain the estimated glomerular filtration  rate (eGFR) is the CKD-EPI equation.        [place] Free Light Chains   Date Value Ref Range Status   01/02/2020 1.43 0.33 - 1.94 mg/dL Final   11/25/2019 1.71 0.33 - 1.94 mg/dL Final   10/15/2019 1.76 0.33 - 1.94 mg/dL Final     Lambda Free Light Chains   Date Value Ref Range Status   01/02/2020 1.00 0.57 - 2.63 mg/dL Final   11/25/2019 1.08 0.57 - 2.63 mg/dL Final   10/15/2019 0.93 0.57 - 2.63 mg/dL Final     Kappa/Lambda FLC  Ratio   Date Value Ref Range Status   01/02/2020 1.43 0.26 - 1.65 Final   11/25/2019 1.58 0.26 - 1.65 Final   10/15/2019 1.89 (H) 0.26 - 1.65 Final     IgG - Serum   Date Value Ref Range Status   01/02/2020 1060 650 - 1600 mg/dL Final     Comment:     IgG Cord Blood Reference Range: 650-1600 mg/dL.     IgA   Date Value Ref Range Status   01/02/2020 83 40 - 350 mg/dL Final     Comment:     IgA Cord Blood Reference Range: <5 mg/dL.     IgM   Date Value Ref Range Status   01/02/2020 25 (L) 50 - 300 mg/dL Final     Comment:     IgM Cord Blood Reference Range: <25 mg/dL.         Assessment:       1. Status post autologous bone marrow transplant    2. Multiple myeloma in remission    3. Anemia due to chemotherapy    4. History of CVA with residual deficit    5. Drug-induced polyneuropathy    6. Essential hypertension    7. Type 2 diabetes mellitus without complication, without long-term current use of insulin    8. Interstitial lung disease    9. Recurrent major depressive disorder, in full remission    10. History of breast cancer           Plan:      Multiple Myeloma  -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) High Risk disease; complex karyotype by CG; , t(14;20), monosomy 13 on FISH  -She has achieved and tolerated well VRd x completing 7, 28 day cycles and achieving stringent  CR as of dec 2019 marrow/biochemical studies   -adequate response to proceed with autoSCT  -she has PS 1-2 and multiple abnormalities in her pre transplant eval that have been cleared by pulmonology, cardiology, gynecology and are not prohibitive to proceed with transplant  -she has JESSE - sp  one dose of iv feraheme with response     -no further velcade maintenance pre transplant, last dose was 12/24/19  -s/p Verenice 140 autologous stem cell transplant, see below    S/p autologous stem cell transplant candidate  -Today is Day +21  - continue acyclovir  - blood counts improving     Regimen as  below:  Transplant Day -2: IV fluids  Transplant Day -1 through Day 0: Antiemetics   Transplant Day -1: Melphalan   Transplant Day 0: Stem Cell Infusion   Transplant Day +7: Growth factor begins     Pancytopenia  -hx of JESSE; received single dose feraheme pretransplant  -colonoscopy okay for transplant  -transfuse for Hgb <7, Plt <10K    F/E  - she is trying to increase po intake  - K 3.4, discussed high potassium foods  - phos wnl, will stop po phos supplement    Neuropathy  -pt has had numbness/tingling to hands and BUE tremors post CVA, for which she is on gabapentin 600 mg BID  - prn tramadol    Hx of CVA  -one in 2008, one in 2011  -cleared by cardiology for transplant    L breast cancer DCIS stage 0  -s/p lumpectomy and radiation, no endocrine tx due to CVA    HTN  -continue norvasc    DM II  -diet controlled     Hepatic lesions  -vascular per recent MRI in 09/2019 with no changes; seen by hepatology pre-transplant and signed off; recommend repeat MRI in 6 months    HLD with Statin Intolerance  -on praluent, PCSK9 inhibitor    Anxiety/Depression  -continue zoloft    ILD  - had abnormal PFTs during transplant evaluation 10/2019; seen by pulm; no significant desaturation on 6MWT; cleared by pulm for transplant  - no sob today and O2 sats >92%        Dispo: RTC in 1 week for CBC, CMP, mag, phos and clinic visit with Dr. Tineo or NP    Kathleen Kimball NP  Hematology/Oncology/BMT

## 2020-03-06 PROBLEM — R11.0 CHEMOTHERAPY-INDUCED NAUSEA: Status: RESOLVED | Noted: 2020-02-18 | Resolved: 2020-03-06

## 2020-03-06 PROBLEM — D47.2 GAMMOPATHY: Status: RESOLVED | Noted: 2019-05-02 | Resolved: 2020-03-06

## 2020-03-06 PROBLEM — R19.7 DIARRHEA: Status: RESOLVED | Noted: 2020-02-21 | Resolved: 2020-03-06

## 2020-03-06 PROBLEM — R93.89 THICKENED ENDOMETRIUM: Status: RESOLVED | Noted: 2019-11-06 | Resolved: 2020-03-06

## 2020-03-06 PROBLEM — D64.9 ACUTE ANEMIA: Status: RESOLVED | Noted: 2019-04-30 | Resolved: 2020-03-06

## 2020-03-06 PROBLEM — N17.9 AKI (ACUTE KIDNEY INJURY): Status: RESOLVED | Noted: 2019-05-01 | Resolved: 2020-03-06

## 2020-03-06 PROBLEM — T45.1X5A CHEMOTHERAPY-INDUCED NAUSEA: Status: RESOLVED | Noted: 2020-02-18 | Resolved: 2020-03-06

## 2020-03-06 PROBLEM — R73.9 HYPERGLYCEMIA: Status: RESOLVED | Noted: 2019-05-04 | Resolved: 2020-03-06

## 2020-03-06 NOTE — PROGRESS NOTES
PATIENT: Shelby Thompson  MRN: [id number]  DATE: 3/4/2020    Chief Complaint: Status post autologous bone marrow transplant      Oncologic History:     Multiple Myeloma- presented w CRAB criteria (Hgb 7.1, hypercalcemia, renal function - Cr of 2.7, T7 vertebral fracture)  IgG Kappa, RISS Stage III (beta-2 micro globulin of 17.5 and 14:20 translocation) High Risk disease  She has achieved and tolerated well VRd x completing 7, 28 day cycles and achieving deep VGPR'  last dose velcade 10/31/19.    Extensive PMH as below.    2 prior CVAs (one in 2008, one in 2011)  L breast cancer DCIS stage 0 (s/p lumpectomy and radiation, no endocrine tx due to CVA)  HTN  DM II  Neuropathy, b/l hands  Prior smoker, quit in 2011  Hepatic lesions - vascular per recent MRI in 09 /2019 with no changes; will confirm no further rascon needed from hep  Statin Intolerance - on praluent, PCSK9 inhibitor   HFpEF - EF 55%, diastolic dysfunction  Anxiety/Depression    ADMISSION SUMMARY: 2/10-2/26/2020  Admitted 2/10, tolerated chemo and transplant well. Engrafted on day +12. Remained afebrile throughout hospital stay and no mucositis. Experienced expected side effects of nausea and diarrhea controlled with antiemetics and Imodium. Discharged home with fernando PT/OT     Today: Ms. Thompson is a 69 y.o. female patient of Dr. Bro who presents today for hospital follow-up post Verenice 140 Auto SCT (see admission summary above). She is day +33 today. She is feeling well overall today. She denies  nausea or vomiting, reports diarrhea today she was able to pass more solid stool, appears this is starting to resolve. Denies fevers, chills, chest pain, sob. Continues with chronic neuropathy.      Review of Systems   Constitutional: Negative.  Negative for fatigue and unexpected weight change.   HENT: Negative.  Negative for nosebleeds.    Eyes: Negative.    Respiratory: Negative for cough.    Cardiovascular: Negative for chest pain and palpitations.  "  Gastrointestinal: Positive for diarrhea (more formed this AM). Negative for abdominal pain, blood in stool, nausea and vomiting.   Endocrine: Negative.    Genitourinary: Negative for difficulty urinating, dysuria and hematuria.   Musculoskeletal: Negative for back pain and gait problem.   Skin: Negative for rash.   Allergic/Immunologic: Negative for immunocompromised state.   Neurological: Positive for tremors, weakness and numbness (just in the right hand; peripheral neuropathy). Negative for headaches.   Hematological: Negative for adenopathy.       ECOG Performance Status: 1   Objective:      Vitals:   Vitals:    03/16/20 1050   BP: (!) 146/93   BP Location: Left arm   Patient Position: Sitting   BP Method: Large (Automatic)   Pulse: 75   Resp: 18   Temp: 97.6 °F (36.4 °C)   TempSrc: Oral   Weight: 68.2 kg (150 lb 5.7 oz)   Height: 5' 3" (1.6 m)       Physical Exam   Constitutional: She is oriented to person, place, and time. She appears well-developed and well-nourished. No distress.   HENT:   Head: Normocephalic and atraumatic.   Mouth/Throat: Oropharynx is clear and moist. No oropharyngeal exudate.   Eyes: Pupils are equal, round, and reactive to light. Conjunctivae and EOM are normal.   Neck: Normal range of motion. Neck supple.   Cardiovascular: Normal rate, regular rhythm and normal heart sounds. Exam reveals no gallop and no friction rub.   No murmur heard.  Pulmonary/Chest: Effort normal and breath sounds normal. No respiratory distress. She has no wheezes. She has no rales.   Abdominal: Soft. Normal appearance and bowel sounds are normal. She exhibits no distension. There is no tenderness. There is no guarding.   Musculoskeletal: Normal range of motion. She exhibits no edema.   Lymphadenopathy:     She has no cervical adenopathy.   Neurological: She is alert and oriented to person, place, and time.   Intentional R hand tremor   Skin: Skin is warm and dry. No rash noted.   Psychiatric: She has a normal " mood and affect. Her behavior is normal.   Nursing note and vitals reviewed.      Assessment:       1. Status post autologous bone marrow transplant    2. Metastatic multiple myeloma to bone    3. Essential hypertension    4. Pure hypercholesterolemia    5. Aortic atherosclerosis    6. Drug-induced polyneuropathy    7. Type 2 diabetes mellitus without complication, without long-term current use of insulin           Plan:      Multiple Myeloma  -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) High Risk disease; complex karyotype by CG; , t(14;20), monosomy 13 on FISH  -She has achieved and tolerated well VRd x completing 7, 28 day cycles and achieving stringent  CR as of dec 2019 marrow/biochemical studies   -adequate response to proceed with autoSCT  -she has PS 1-2 and multiple abnormalities in her pre transplant eval that have been cleared by pulmonology, cardiology, gynecology and are not prohibitive to proceed with transplant  -she has JESSE - sp  one dose of iv feraheme with response     -no further velcade maintenance pre transplant, last dose was 12/24/19  -s/p Verenice 140 autologous stem cell transplant, see below    S/p autologous stem cell transplant candidate  -Today is Day +33  - continue acyclovir  - blood counts improving     Regimen as below:  Transplant Day -2: IV fluids  Transplant Day -1 through Day 0: Antiemetics   Transplant Day -1: Melphalan   Transplant Day 0: Stem Cell Infusion   Transplant Day +7: Growth factor begins     Pancytopenia  -hx of JESSE; received single dose feraheme pretransplant  -colonoscopy okay for transplant  -transfuse for Hgb <7, Plt <10K    F/E  - she is trying to increase po intake  - K 3.4, discussed high potassium foods  - phos and magnesium wnl    Neuropathy  -pt has had numbness/tingling to hands and BUE tremors post CVA, for which she is on gabapentin 600 mg BID  - prn tramadol    Hx of CVA  -one in 2008, one in 2011  -cleared  by cardiology for transplant    L breast cancer DCIS stage 0  -s/p lumpectomy and radiation, no endocrine tx due to CVA    HTN  -continue norvasc    DM II  -diet controlled     Hepatic lesions  -vascular per recent MRI in 09/2019 with no changes; seen by hepatology pre-transplant and signed off; recommend repeat MRI in 6 months    HLD with Statin Intolerance  -on praluent, PCSK9 inhibitor    Anxiety/Depression  -continue zoloft    ILD  - had abnormal PFTs during transplant evaluation 10/2019; seen by pulm; no significant desaturation on 6MWT; cleared by pulm for transplant  - no sob today and O2 sats >92%        Dispo: RTC in 2 weeks for CBC, CMP and clinic visit with Dr. Tineo    Patient is in agreement with the proposed treatment plan. All questions were answered to the patient's satisfaction. Patient knows to call clinic for any new or worsening symptoms and if anything is needed before the next clinic visit.          Jenni Haider, FNP-C  Hematology & Medical Oncology   [facility] [place] [address]  ph. [phone]  Fax. [phone]    Patient dicussed with collaborating physician, Dr. Tineo.

## 2020-03-15 DIAGNOSIS — Z94.81 STATUS POST AUTOLOGOUS BONE MARROW TRANSPLANT: Primary | ICD-10-CM

## 2020-03-16 ENCOUNTER — LAB VISIT (OUTPATIENT)
Dept: LAB | Facility: HOSPITAL | Age: 70
End: 2020-03-16
Payer: MEDICARE

## 2020-03-16 ENCOUNTER — OFFICE VISIT (OUTPATIENT)
Dept: HEMATOLOGY/ONCOLOGY | Facility: CLINIC | Age: 70
End: 2020-03-16
Payer: MEDICARE

## 2020-03-16 VITALS
TEMPERATURE: 98 F | RESPIRATION RATE: 18 BRPM | BODY MASS INDEX: 26.64 KG/M2 | SYSTOLIC BLOOD PRESSURE: 146 MMHG | HEART RATE: 75 BPM | HEIGHT: 63 IN | DIASTOLIC BLOOD PRESSURE: 93 MMHG | WEIGHT: 150.38 LBS

## 2020-03-16 DIAGNOSIS — E11.9 TYPE 2 DIABETES MELLITUS WITHOUT COMPLICATION, WITHOUT LONG-TERM CURRENT USE OF INSULIN: ICD-10-CM

## 2020-03-16 DIAGNOSIS — G62.0 DRUG-INDUCED POLYNEUROPATHY: ICD-10-CM

## 2020-03-16 DIAGNOSIS — I10 ESSENTIAL HYPERTENSION: ICD-10-CM

## 2020-03-16 DIAGNOSIS — C90.00 METASTATIC MULTIPLE MYELOMA TO BONE: ICD-10-CM

## 2020-03-16 DIAGNOSIS — C90.00 MULTIPLE MYELOMA NOT HAVING ACHIEVED REMISSION: ICD-10-CM

## 2020-03-16 DIAGNOSIS — I70.0 AORTIC ATHEROSCLEROSIS: ICD-10-CM

## 2020-03-16 DIAGNOSIS — Z94.81 STATUS POST AUTOLOGOUS BONE MARROW TRANSPLANT: Primary | ICD-10-CM

## 2020-03-16 DIAGNOSIS — Z94.81 STATUS POST AUTOLOGOUS BONE MARROW TRANSPLANT: ICD-10-CM

## 2020-03-16 DIAGNOSIS — E78.00 PURE HYPERCHOLESTEROLEMIA: ICD-10-CM

## 2020-03-16 LAB
ABO + RH BLD: NORMAL
ALBUMIN SERPL BCP-MCNC: 3.9 G/DL (ref 3.5–5.2)
ALP SERPL-CCNC: 59 U/L (ref 55–135)
ALT SERPL W/O P-5'-P-CCNC: 13 U/L (ref 10–44)
ANION GAP SERPL CALC-SCNC: 10 MMOL/L (ref 8–16)
AST SERPL-CCNC: 25 U/L (ref 10–40)
BASOPHILS # BLD AUTO: 0.02 K/UL (ref 0–0.2)
BASOPHILS NFR BLD: 0.5 % (ref 0–1.9)
BILIRUB SERPL-MCNC: 0.4 MG/DL (ref 0.1–1)
BLD GP AB SCN CELLS X3 SERPL QL: NORMAL
BUN SERPL-MCNC: 8 MG/DL (ref 8–23)
CALCIUM SERPL-MCNC: 9.3 MG/DL (ref 8.7–10.5)
CHLORIDE SERPL-SCNC: 106 MMOL/L (ref 95–110)
CO2 SERPL-SCNC: 28 MMOL/L (ref 23–29)
CREAT SERPL-MCNC: 0.9 MG/DL (ref 0.5–1.4)
DIFFERENTIAL METHOD: ABNORMAL
EOSINOPHIL # BLD AUTO: 0.1 K/UL (ref 0–0.5)
EOSINOPHIL NFR BLD: 1.9 % (ref 0–8)
ERYTHROCYTE [DISTWIDTH] IN BLOOD BY AUTOMATED COUNT: 18.3 % (ref 11.5–14.5)
EST. GFR  (AFRICAN AMERICAN): >60 ML/MIN/1.73 M^2
EST. GFR  (NON AFRICAN AMERICAN): >60 ML/MIN/1.73 M^2
GLUCOSE SERPL-MCNC: 90 MG/DL (ref 70–110)
HCT VFR BLD AUTO: 37.9 % (ref 37–48.5)
HGB BLD-MCNC: 11.3 G/DL (ref 12–16)
IMM GRANULOCYTES # BLD AUTO: 0.01 K/UL (ref 0–0.04)
IMM GRANULOCYTES NFR BLD AUTO: 0.2 % (ref 0–0.5)
LYMPHOCYTES # BLD AUTO: 1.2 K/UL (ref 1–4.8)
LYMPHOCYTES NFR BLD: 27.1 % (ref 18–48)
MAGNESIUM SERPL-MCNC: 2.1 MG/DL (ref 1.6–2.6)
MCH RBC QN AUTO: 25.2 PG (ref 27–31)
MCHC RBC AUTO-ENTMCNC: 29.8 G/DL (ref 32–36)
MCV RBC AUTO: 84 FL (ref 82–98)
MONOCYTES # BLD AUTO: 0.7 K/UL (ref 0.3–1)
MONOCYTES NFR BLD: 15.7 % (ref 4–15)
NEUTROPHILS # BLD AUTO: 2.3 K/UL (ref 1.8–7.7)
NEUTROPHILS NFR BLD: 54.6 % (ref 38–73)
NRBC BLD-RTO: 0 /100 WBC
PHOSPHATE SERPL-MCNC: 3.3 MG/DL (ref 2.7–4.5)
PLATELET # BLD AUTO: 141 K/UL (ref 150–350)
PMV BLD AUTO: 10.3 FL (ref 9.2–12.9)
POTASSIUM SERPL-SCNC: 3.4 MMOL/L (ref 3.5–5.1)
PROT SERPL-MCNC: 7.4 G/DL (ref 6–8.4)
RBC # BLD AUTO: 4.49 M/UL (ref 4–5.4)
SODIUM SERPL-SCNC: 144 MMOL/L (ref 136–145)
WBC # BLD AUTO: 4.28 K/UL (ref 3.9–12.7)

## 2020-03-16 PROCEDURE — 3051F PR MOST RECENT HEMOGLOBIN A1C LEVEL 7.0 - < 8.0%: ICD-10-PCS | Mod: HCNC,BMT,CPTII,S$GLB | Performed by: NURSE PRACTITIONER

## 2020-03-16 PROCEDURE — 86850 RBC ANTIBODY SCREEN: CPT | Mod: HCNC

## 2020-03-16 PROCEDURE — 99999 PR PBB SHADOW E&M-EST. PATIENT-LVL IV: ICD-10-PCS | Mod: PBBFAC,HCNC,BMT, | Performed by: NURSE PRACTITIONER

## 2020-03-16 PROCEDURE — 99499 RISK ADDL DX/OHS AUDIT: ICD-10-PCS | Mod: HCNC,BMT,S$GLB, | Performed by: NURSE PRACTITIONER

## 2020-03-16 PROCEDURE — 99999 PR PBB SHADOW E&M-EST. PATIENT-LVL IV: CPT | Mod: PBBFAC,HCNC,BMT, | Performed by: NURSE PRACTITIONER

## 2020-03-16 PROCEDURE — 99214 PR OFFICE/OUTPT VISIT, EST, LEVL IV, 30-39 MIN: ICD-10-PCS | Mod: HCNC,BMT,S$GLB, | Performed by: NURSE PRACTITIONER

## 2020-03-16 PROCEDURE — 83735 ASSAY OF MAGNESIUM: CPT | Mod: HCNC

## 2020-03-16 PROCEDURE — 99214 OFFICE O/P EST MOD 30 MIN: CPT | Mod: HCNC,BMT,S$GLB, | Performed by: NURSE PRACTITIONER

## 2020-03-16 PROCEDURE — 85025 COMPLETE CBC W/AUTO DIFF WBC: CPT | Mod: HCNC

## 2020-03-16 PROCEDURE — 84100 ASSAY OF PHOSPHORUS: CPT | Mod: HCNC

## 2020-03-16 PROCEDURE — 80053 COMPREHEN METABOLIC PANEL: CPT | Mod: HCNC

## 2020-03-16 PROCEDURE — 3051F HG A1C>EQUAL 7.0%<8.0%: CPT | Mod: HCNC,BMT,CPTII,S$GLB | Performed by: NURSE PRACTITIONER

## 2020-03-16 PROCEDURE — 99499 UNLISTED E&M SERVICE: CPT | Mod: HCNC,BMT,S$GLB, | Performed by: NURSE PRACTITIONER

## 2020-03-17 ENCOUNTER — TELEPHONE (OUTPATIENT)
Dept: PHARMACY | Facility: CLINIC | Age: 70
End: 2020-03-17

## 2020-03-17 NOTE — TELEPHONE ENCOUNTER
Praluent 75mg/ml PENS refill. Confirmed two patient identifiers - name + . She had stopped medication while she was hospitalized for bone marrow transplant and there was some back on forth about coverage in . She is ready to restart Praluent on 3/23/20 and continue to dose every other Monday, like she did before. Patient confirms dosage (SQ Q14D). Patient has 0 doses remaining, next dose needed by 3/23/20. [facility] to ship out Praluent on 3/19/20 to arrive at patients home on 3/20/20 via [employer]. Address confirmed (Notes to [employer]: none), $0 Copay, Supplies needed: injection kit. Patient denies starting any new medications, having any new diagnoses or allergies, side effects, or missing any doses. All questions answered to patients satisfaction. [facility] will continue to reach out to patient monthly for refills. TTN

## 2020-04-01 ENCOUNTER — LAB VISIT (OUTPATIENT)
Dept: LAB | Facility: HOSPITAL | Age: 70
End: 2020-04-01
Payer: MEDICARE

## 2020-04-01 DIAGNOSIS — C90.00 MULTIPLE MYELOMA NOT HAVING ACHIEVED REMISSION: ICD-10-CM

## 2020-04-01 DIAGNOSIS — Z94.81 STATUS POST AUTOLOGOUS BONE MARROW TRANSPLANT: ICD-10-CM

## 2020-04-01 LAB
ABO + RH BLD: NORMAL
ALBUMIN SERPL BCP-MCNC: 3.7 G/DL (ref 3.5–5.2)
ALP SERPL-CCNC: 62 U/L (ref 55–135)
ALT SERPL W/O P-5'-P-CCNC: 10 U/L (ref 10–44)
ANION GAP SERPL CALC-SCNC: 8 MMOL/L (ref 8–16)
AST SERPL-CCNC: 22 U/L (ref 10–40)
BASOPHILS # BLD AUTO: 0.03 K/UL (ref 0–0.2)
BASOPHILS NFR BLD: 0.8 % (ref 0–1.9)
BILIRUB SERPL-MCNC: 0.3 MG/DL (ref 0.1–1)
BLD GP AB SCN CELLS X3 SERPL QL: NORMAL
BUN SERPL-MCNC: 11 MG/DL (ref 8–23)
CALCIUM SERPL-MCNC: 9.4 MG/DL (ref 8.7–10.5)
CHLORIDE SERPL-SCNC: 105 MMOL/L (ref 95–110)
CO2 SERPL-SCNC: 30 MMOL/L (ref 23–29)
CREAT SERPL-MCNC: 0.9 MG/DL (ref 0.5–1.4)
DIFFERENTIAL METHOD: ABNORMAL
EOSINOPHIL # BLD AUTO: 0.1 K/UL (ref 0–0.5)
EOSINOPHIL NFR BLD: 2.8 % (ref 0–8)
ERYTHROCYTE [DISTWIDTH] IN BLOOD BY AUTOMATED COUNT: 18 % (ref 11.5–14.5)
EST. GFR  (AFRICAN AMERICAN): >60 ML/MIN/1.73 M^2
EST. GFR  (NON AFRICAN AMERICAN): >60 ML/MIN/1.73 M^2
GLUCOSE SERPL-MCNC: 92 MG/DL (ref 70–110)
HCT VFR BLD AUTO: 37.9 % (ref 37–48.5)
HGB BLD-MCNC: 11.1 G/DL (ref 12–16)
IMM GRANULOCYTES # BLD AUTO: 0.01 K/UL (ref 0–0.04)
IMM GRANULOCYTES NFR BLD AUTO: 0.3 % (ref 0–0.5)
LYMPHOCYTES # BLD AUTO: 0.5 K/UL (ref 1–4.8)
LYMPHOCYTES NFR BLD: 14.2 % (ref 18–48)
MCH RBC QN AUTO: 24.9 PG (ref 27–31)
MCHC RBC AUTO-ENTMCNC: 29.3 G/DL (ref 32–36)
MCV RBC AUTO: 85 FL (ref 82–98)
MONOCYTES # BLD AUTO: 0.5 K/UL (ref 0.3–1)
MONOCYTES NFR BLD: 14.7 % (ref 4–15)
NEUTROPHILS # BLD AUTO: 2.4 K/UL (ref 1.8–7.7)
NEUTROPHILS NFR BLD: 67.2 % (ref 38–73)
NRBC BLD-RTO: 0 /100 WBC
PHOSPHATE SERPL-MCNC: 3.5 MG/DL (ref 2.7–4.5)
PLATELET # BLD AUTO: 198 K/UL (ref 150–350)
PMV BLD AUTO: 11 FL (ref 9.2–12.9)
POTASSIUM SERPL-SCNC: 3.8 MMOL/L (ref 3.5–5.1)
PROT SERPL-MCNC: 7.8 G/DL (ref 6–8.4)
RBC # BLD AUTO: 4.45 M/UL (ref 4–5.4)
SODIUM SERPL-SCNC: 143 MMOL/L (ref 136–145)
WBC # BLD AUTO: 3.53 K/UL (ref 3.9–12.7)

## 2020-04-01 PROCEDURE — 86850 RBC ANTIBODY SCREEN: CPT | Mod: HCNC

## 2020-04-01 PROCEDURE — 80053 COMPREHEN METABOLIC PANEL: CPT | Mod: HCNC

## 2020-04-01 PROCEDURE — 85025 COMPLETE CBC W/AUTO DIFF WBC: CPT | Mod: HCNC

## 2020-04-01 PROCEDURE — 84100 ASSAY OF PHOSPHORUS: CPT | Mod: HCNC

## 2020-04-07 ENCOUNTER — DOCUMENTATION ONLY (OUTPATIENT)
Dept: HEMATOLOGY/ONCOLOGY | Facility: CLINIC | Age: 70
End: 2020-04-07

## 2020-04-08 ENCOUNTER — OFFICE VISIT (OUTPATIENT)
Dept: HEMATOLOGY/ONCOLOGY | Facility: CLINIC | Age: 70
End: 2020-04-08
Payer: MEDICARE

## 2020-04-08 DIAGNOSIS — Z94.84 HISTORY OF AUTO STEM CELL TRANSPLANT: ICD-10-CM

## 2020-04-08 DIAGNOSIS — C90.01 MULTIPLE MYELOMA IN REMISSION: ICD-10-CM

## 2020-04-08 DIAGNOSIS — C90.01 MULTIPLE MYELOMA IN REMISSION: Primary | ICD-10-CM

## 2020-04-08 PROCEDURE — 99499 UNLISTED E&M SERVICE: CPT | Mod: HCNC,BMT,95, | Performed by: INTERNAL MEDICINE

## 2020-04-08 PROCEDURE — 99214 PR OFFICE/OUTPT VISIT, EST, LEVL IV, 30-39 MIN: ICD-10-PCS | Mod: HCNC,BMT,95, | Performed by: INTERNAL MEDICINE

## 2020-04-08 PROCEDURE — 99214 OFFICE O/P EST MOD 30 MIN: CPT | Mod: HCNC,BMT,95, | Performed by: INTERNAL MEDICINE

## 2020-04-08 PROCEDURE — 1101F PR PT FALLS ASSESS DOC 0-1 FALLS W/OUT INJ PAST YR: ICD-10-PCS | Mod: HCNC,BMT,CPTII,95 | Performed by: INTERNAL MEDICINE

## 2020-04-08 PROCEDURE — 1101F PT FALLS ASSESS-DOCD LE1/YR: CPT | Mod: HCNC,BMT,CPTII,95 | Performed by: INTERNAL MEDICINE

## 2020-04-08 PROCEDURE — 99499 RISK ADDL DX/OHS AUDIT: ICD-10-PCS | Mod: HCNC,BMT,95, | Performed by: INTERNAL MEDICINE

## 2020-04-08 PROCEDURE — 1159F PR MEDICATION LIST DOCUMENTED IN MEDICAL RECORD: ICD-10-PCS | Mod: HCNC,BMT,95, | Performed by: INTERNAL MEDICINE

## 2020-04-08 PROCEDURE — 1159F MED LIST DOCD IN RCRD: CPT | Mod: HCNC,BMT,95, | Performed by: INTERNAL MEDICINE

## 2020-04-08 RX ORDER — SODIUM CHLORIDE 0.9 % (FLUSH) 0.9 %
10 SYRINGE (ML) INJECTION
Status: CANCELLED | OUTPATIENT
Start: 2020-04-22

## 2020-04-08 RX ORDER — BORTEZOMIB 3.5 MG/1
1.3 INJECTION, POWDER, LYOPHILIZED, FOR SOLUTION INTRAVENOUS; SUBCUTANEOUS
Status: CANCELLED | OUTPATIENT
Start: 2020-04-22

## 2020-04-08 RX ORDER — HEPARIN 100 UNIT/ML
500 SYRINGE INTRAVENOUS
Status: CANCELLED | OUTPATIENT
Start: 2020-04-22

## 2020-04-08 NOTE — PROGRESS NOTES
The patient location is: home   The chief complaint leading to consultation is: myeloma/SCT fu  Visit type: Virtual visit with synchronous audio and video  Total time spent with patient: 15 minutes   Each patient to whom he or she provides medical services by telemedicine is:  (1) informed of the relationship between the physician and patient and the respective role of any other health care provider with respect to management of the patient; and (2) notified that he or she may decline to receive medical services by telemedicine and may withdraw from such care at any time.    Notes:   70 y.o. female; myeloma fu; day +56 from [facility]  Doing well.   Some mild fatigue.  Otherwise asymptomatic.        Multiple Myeloma  -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) High Risk disease; complex karyotype by CG; , t(14;20), monosomy 13 on FISH  -She has achieved and tolerated well VRd x completing 7, 28 day cycles and achieving stringent  CR as of dec 2019 marrow/biochemical studies    -s/p [facility] 140 autologous stem cell transplant, see below   -Defer marrow 2-3 months due to covid  - Start velcade maint early since feels well and high risk  acyc while on velcade  -start vaccines at day +180    S/p autologous stem cell transplant candidate  -Today is Day +56  - continue acyclovir  - blood counts improving      Regimen as below:  Transplant Day -2: IV fluids  Transplant Day -1 through Day 0: Antiemetics   Transplant Day -1: Melphalan   Transplant Day 0: Stem Cell Infusion   Transplant Day +7: Growth factor begins     Pancytopenia  -hx of JESSE; received single dose feraheme pretransplant  -colonoscopy okay for transplant  -transfuse for Hgb <7, Plt <10K     F/E  - she is trying to increase po intake with success  - phos and magnesium wnl; can stop supplements      Neuropathy  -pt has had numbness/tingling to hands and BUE tremors post CVA, for which she is on gabapentin 600 mg  BID  - prn tramadol     Hx of CVA  -one in 2008, one in 2011  -cleared by cardiology for transplant     L breast cancer DCIS stage 0  -s/p lumpectomy and radiation, no endocrine tx due to CVA     HTN  -continue norvasc     DM II  -diet controlled      Hepatic lesions  -vascular per recent MRI in 09/2019 with no changes; seen by hepatology pre-transplant and signed off; recommend repeat MRI in 6 months     HLD with Statin Intolerance  -on praluent, PCSK9 inhibitor     Anxiety/Depression  -continue zoloft     ILD  - had abnormal PFTs during transplant evaluation 10/2019; seen by pulm; no significant desaturation on 6MWT; cleared by pulm for transplant  - no sob today and O2 sats >92%     FU   -cbc, cmp, serum free light chains, quantitative immunoglobulins, serum electropheresis, serum immunofixation, MD appt, and velcade injection in 2 weeks  -likely bone marrow biopsy in 8 weeks

## 2020-04-08 NOTE — Clinical Note
-cbc, cmp, serum free light chains, quantitative immunoglobulins, serum electropheresis, serum immunofixation, MD appt, and velcade injection in 2 weeks

## 2020-04-22 DIAGNOSIS — C90.01 MULTIPLE MYELOMA IN REMISSION: ICD-10-CM

## 2020-04-22 RX ORDER — LANOLIN ALCOHOL/MO/W.PET/CERES
400 CREAM (GRAM) TOPICAL 2 TIMES DAILY
Qty: 60 TABLET | Refills: 2 | Status: SHIPPED | OUTPATIENT
Start: 2020-04-22 | End: 2021-02-17 | Stop reason: ALTCHOICE

## 2020-04-22 RX ORDER — OMEPRAZOLE 40 MG/1
40 CAPSULE, DELAYED RELEASE ORAL DAILY
Qty: 90 CAPSULE | Refills: 3 | Status: SHIPPED | OUTPATIENT
Start: 2020-04-22 | End: 2020-06-04 | Stop reason: SDUPTHER

## 2020-04-23 ENCOUNTER — HOSPITAL ENCOUNTER (OUTPATIENT)
Dept: RADIOLOGY | Facility: HOSPITAL | Age: 70
Discharge: HOME | End: 2020-04-23
Attending: INTERNAL MEDICINE
Payer: MEDICARE

## 2020-04-23 ENCOUNTER — TELEPHONE (OUTPATIENT)
Dept: HEPATOLOGY | Facility: CLINIC | Age: 70
End: 2020-04-23

## 2020-04-23 DIAGNOSIS — R16.0 LIVER MASSES: ICD-10-CM

## 2020-04-23 DIAGNOSIS — R16.0 LIVER MASS: ICD-10-CM

## 2020-04-23 PROCEDURE — 74183 MRI ABD W/O CNTR FLWD CNTR: CPT | Mod: 26,HCNC,BMT, | Performed by: RADIOLOGY

## 2020-04-23 PROCEDURE — 25500020 PHARM REV CODE 255: Mod: HCNC | Performed by: INTERNAL MEDICINE

## 2020-04-23 PROCEDURE — A9585 GADOBUTROL INJECTION: HCPCS | Mod: HCNC | Performed by: INTERNAL MEDICINE

## 2020-04-23 PROCEDURE — 74183 MRI ABD W/O CNTR FLWD CNTR: CPT | Mod: TC,HCNC

## 2020-04-23 PROCEDURE — 74183 MRI ABDOMEN W WO CONTRAST: ICD-10-PCS | Mod: 26,HCNC,BMT, | Performed by: RADIOLOGY

## 2020-04-23 RX ORDER — GADOBUTROL 604.72 MG/ML
7 INJECTION INTRAVENOUS
Status: COMPLETED | OUTPATIENT
Start: 2020-04-23 | End: 2020-04-23

## 2020-04-23 RX ADMIN — GADOBUTROL 7 ML: 604.72 INJECTION INTRAVENOUS at 09:04

## 2020-04-23 NOTE — TELEPHONE ENCOUNTER
Patient: Shelby Thompson       MRN: [id number]      : 1950     Age: 70 y.o.  [address] [facility] [address]    Provider: Hepatologist Evelio Perez    Urgency of review: non-urgent    Patient Transplant Status: Other non-transplant    Reason for presentation: Reassessment    Clinical Summary:   70 F, with hx of multiple myeloma, s/p lenlidomide and bortezomib.   No underlying liver disease. 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  Repeat MRI as recommended    Imaging to be reviewed: recent MRI    HCC Treatment History: n/a    ABO: O POS    Platelets:   Lab Results   Component Value Date/Time     2020 09:18 AM     Creatinine:   Lab Results   Component Value Date/Time    CREATININE 0.9 2020 09:18 AM     Bilirubin:   Lab Results   Component Value Date/Time    BILITOT 0.3 2020 09:18 AM     AFP Last 3 each if available:   Lab Results   Component Value Date/Time    AFP 1.5 2019 03:42 AM    AFP 2.0 2015 09:15 AM       MELD: MELD-Na score: 6 at 2020  5:00 PM  MELD score: 6 at 2020  5:00 PM  Calculated from:  Serum Creatinine: 0.9 mg/dL (Rounded to 1 mg/dL) at 2020  5:00 PM  Serum Sodium: 151 mmol/L (Rounded to 137 mmol/L) at 2020  5:00 PM  Total Bilirubin: 0.3 mg/dL (Rounded to 1 mg/dL) at 2020  5:00 PM  INR(ratio): 1.0 at 2/3/2020 10:59 AM  Age: 69 years    Plan:     Follow-up Provider:

## 2020-04-24 ENCOUNTER — LAB VISIT (OUTPATIENT)
Dept: LAB | Facility: HOSPITAL | Age: 70
End: 2020-04-24
Attending: INTERNAL MEDICINE
Payer: MEDICARE

## 2020-04-24 DIAGNOSIS — C90.00 MULTIPLE MYELOMA NOT HAVING ACHIEVED REMISSION: ICD-10-CM

## 2020-04-24 LAB
ALBUMIN SERPL BCP-MCNC: 3.9 G/DL (ref 3.5–5.2)
ALP SERPL-CCNC: 70 U/L (ref 55–135)
ALT SERPL W/O P-5'-P-CCNC: 19 U/L (ref 10–44)
ANION GAP SERPL CALC-SCNC: 10 MMOL/L (ref 8–16)
AST SERPL-CCNC: 29 U/L (ref 10–40)
BASOPHILS # BLD AUTO: 0.03 K/UL (ref 0–0.2)
BASOPHILS NFR BLD: 0.9 % (ref 0–1.9)
BILIRUB SERPL-MCNC: 0.4 MG/DL (ref 0.1–1)
BUN SERPL-MCNC: 10 MG/DL (ref 8–23)
CALCIUM SERPL-MCNC: 9 MG/DL (ref 8.7–10.5)
CHLORIDE SERPL-SCNC: 106 MMOL/L (ref 95–110)
CO2 SERPL-SCNC: 29 MMOL/L (ref 23–29)
CREAT SERPL-MCNC: 0.9 MG/DL (ref 0.5–1.4)
DIFFERENTIAL METHOD: ABNORMAL
EOSINOPHIL # BLD AUTO: 0.1 K/UL (ref 0–0.5)
EOSINOPHIL NFR BLD: 2.4 % (ref 0–8)
ERYTHROCYTE [DISTWIDTH] IN BLOOD BY AUTOMATED COUNT: 18.5 % (ref 11.5–14.5)
EST. GFR  (AFRICAN AMERICAN): >60 ML/MIN/1.73 M^2
EST. GFR  (NON AFRICAN AMERICAN): >60 ML/MIN/1.73 M^2
GLUCOSE SERPL-MCNC: 90 MG/DL (ref 70–110)
HCT VFR BLD AUTO: 37.4 % (ref 37–48.5)
HGB BLD-MCNC: 11.3 G/DL (ref 12–16)
IGA SERPL-MCNC: 85 MG/DL (ref 40–350)
IGG SERPL-MCNC: 1429 MG/DL (ref 650–1600)
IGM SERPL-MCNC: 40 MG/DL (ref 50–300)
IMM GRANULOCYTES # BLD AUTO: 0.01 K/UL (ref 0–0.04)
IMM GRANULOCYTES NFR BLD AUTO: 0.3 % (ref 0–0.5)
LYMPHOCYTES # BLD AUTO: 0.5 K/UL (ref 1–4.8)
LYMPHOCYTES NFR BLD: 13.6 % (ref 18–48)
MCH RBC QN AUTO: 25.9 PG (ref 27–31)
MCHC RBC AUTO-ENTMCNC: 30.2 G/DL (ref 32–36)
MCV RBC AUTO: 86 FL (ref 82–98)
MONOCYTES # BLD AUTO: 0.5 K/UL (ref 0.3–1)
MONOCYTES NFR BLD: 13.9 % (ref 4–15)
NEUTROPHILS # BLD AUTO: 2.3 K/UL (ref 1.8–7.7)
NEUTROPHILS NFR BLD: 68.9 % (ref 38–73)
NRBC BLD-RTO: 0 /100 WBC
PLATELET # BLD AUTO: 181 K/UL (ref 150–350)
PMV BLD AUTO: 10.3 FL (ref 9.2–12.9)
POTASSIUM SERPL-SCNC: 3.4 MMOL/L (ref 3.5–5.1)
PROT SERPL-MCNC: 7.7 G/DL (ref 6–8.4)
RBC # BLD AUTO: 4.36 M/UL (ref 4–5.4)
SODIUM SERPL-SCNC: 145 MMOL/L (ref 136–145)
WBC # BLD AUTO: 3.3 K/UL (ref 3.9–12.7)

## 2020-04-24 PROCEDURE — 82784 ASSAY IGA/IGD/IGG/IGM EACH: CPT | Mod: 59,HCNC

## 2020-04-24 PROCEDURE — 86334 PATHOLOGIST INTERPRETATION IFE: ICD-10-PCS | Mod: 26,HCNC,BMT, | Performed by: PATHOLOGY

## 2020-04-24 PROCEDURE — 85025 COMPLETE CBC W/AUTO DIFF WBC: CPT | Mod: HCNC

## 2020-04-24 PROCEDURE — 80053 COMPREHEN METABOLIC PANEL: CPT | Mod: HCNC

## 2020-04-24 PROCEDURE — 84165 PATHOLOGIST INTERPRETATION SPE: ICD-10-PCS | Mod: 26,HCNC,BMT, | Performed by: PATHOLOGY

## 2020-04-24 PROCEDURE — 86334 IMMUNOFIX E-PHORESIS SERUM: CPT | Mod: 26,HCNC,BMT, | Performed by: PATHOLOGY

## 2020-04-24 PROCEDURE — 83520 IMMUNOASSAY QUANT NOS NONAB: CPT | Mod: 59,HCNC

## 2020-04-24 PROCEDURE — 84165 PROTEIN E-PHORESIS SERUM: CPT | Mod: 26,HCNC,BMT, | Performed by: PATHOLOGY

## 2020-04-24 PROCEDURE — 86334 IMMUNOFIX E-PHORESIS SERUM: CPT | Mod: HCNC

## 2020-04-24 PROCEDURE — 36415 COLL VENOUS BLD VENIPUNCTURE: CPT | Mod: HCNC

## 2020-04-24 PROCEDURE — 84165 PROTEIN E-PHORESIS SERUM: CPT | Mod: HCNC

## 2020-04-27 LAB
ALBUMIN SERPL ELPH-MCNC: 4.13 G/DL (ref 3.35–5.55)
ALPHA1 GLOB SERPL ELPH-MCNC: 0.49 G/DL (ref 0.17–0.41)
ALPHA2 GLOB SERPL ELPH-MCNC: 0.86 G/DL (ref 0.43–0.99)
B-GLOBULIN SERPL ELPH-MCNC: 0.78 G/DL (ref 0.5–1.1)
GAMMA GLOB SERPL ELPH-MCNC: 1.34 G/DL (ref 0.67–1.58)
INTERPRETATION SERPL IFE-IMP: NORMAL
KAPPA LC SER QL IA: 3.32 MG/DL (ref 0.33–1.94)
KAPPA LC/LAMBDA SER IA: 2.53 (ref 0.26–1.65)
LAMBDA LC SER QL IA: 1.31 MG/DL (ref 0.57–2.63)
PATHOLOGIST INTERPRETATION IFE: NORMAL
PROT SERPL-MCNC: 7.6 G/DL (ref 6–8.4)

## 2020-04-28 ENCOUNTER — OFFICE VISIT (OUTPATIENT)
Dept: HEMATOLOGY/ONCOLOGY | Facility: CLINIC | Age: 70
End: 2020-04-28
Payer: MEDICARE

## 2020-04-28 DIAGNOSIS — Z94.84 HISTORY OF AUTO STEM CELL TRANSPLANT: ICD-10-CM

## 2020-04-28 DIAGNOSIS — C90.00 MULTIPLE MYELOMA, REMISSION STATUS UNSPECIFIED: Primary | ICD-10-CM

## 2020-04-28 LAB — PATHOLOGIST INTERPRETATION SPE: NORMAL

## 2020-04-28 PROCEDURE — 1101F PT FALLS ASSESS-DOCD LE1/YR: CPT | Mod: HCNC,BMT,CPTII,95 | Performed by: INTERNAL MEDICINE

## 2020-04-28 PROCEDURE — 99214 OFFICE O/P EST MOD 30 MIN: CPT | Mod: HCNC,BMT,95, | Performed by: INTERNAL MEDICINE

## 2020-04-28 PROCEDURE — 1159F PR MEDICATION LIST DOCUMENTED IN MEDICAL RECORD: ICD-10-PCS | Mod: HCNC,BMT,95, | Performed by: INTERNAL MEDICINE

## 2020-04-28 PROCEDURE — 99214 PR OFFICE/OUTPT VISIT, EST, LEVL IV, 30-39 MIN: ICD-10-PCS | Mod: HCNC,BMT,95, | Performed by: INTERNAL MEDICINE

## 2020-04-28 PROCEDURE — 1101F PR PT FALLS ASSESS DOC 0-1 FALLS W/OUT INJ PAST YR: ICD-10-PCS | Mod: HCNC,BMT,CPTII,95 | Performed by: INTERNAL MEDICINE

## 2020-04-28 PROCEDURE — 1159F MED LIST DOCD IN RCRD: CPT | Mod: HCNC,BMT,95, | Performed by: INTERNAL MEDICINE

## 2020-04-28 NOTE — Clinical Note
-Cbc, cmp, bone marrow biopsy in clinic, and velcade injection one day next week where np has open slot-cbc, cmp, serum free light chains, quantitative immunoglobulins, serum electropheresis, serum immunofixation, np appt and velcade injection 2 weeks from previous velcade injection -please call pt to schedule

## 2020-04-28 NOTE — PROGRESS NOTES
The patient location is: home  The chief complaint leading to consultation is: MM/autoSCT fu   Visit type: audiovisual  Total time spent with patient: 20 minutes  Each patient to whom he or she provides medical services by telemedicine is:  (1) informed of the relationship between the physician and patient and the respective role of any other health care provider with respect to management of the patient; and (2) notified that he or she may decline to receive medical services by telemedicine and may withdraw from such care at any time.    Notes: 70 y.o. female; high risk 17p del MM: [facility]    Notes fatigue but otherwise asymptomatic.      In light of reemergence of paraprotein  On 4/24/20 labs; will move day +100 marrow up to next week and restart velcade and possibly add back rev/dex pending marrow results in 2 weeks    continue mild fatigue should improved  Continue acyclovir and other chronic medications     FU  Cbc, cmp, bone marrow biopsy in clinic, and velcade injection one day next week where np has open slot  cbc, cmp, serum free light chains, quantitative immunoglobulins, serum electropheresis, serum immunofixation, np appt and velcade injection 2 weeks from previous velcade injection

## 2020-04-29 ENCOUNTER — TELEPHONE (OUTPATIENT)
Dept: HEMATOLOGY/ONCOLOGY | Facility: CLINIC | Age: 70
End: 2020-04-29

## 2020-04-29 DIAGNOSIS — C90.00 MULTIPLE MYELOMA NOT HAVING ACHIEVED REMISSION: ICD-10-CM

## 2020-04-29 DIAGNOSIS — Z13.9 SCREENING FOR UNSPECIFIED CONDITION: ICD-10-CM

## 2020-04-29 DIAGNOSIS — Z51.11 ENCOUNTER FOR ANTINEOPLASTIC CHEMOTHERAPY: ICD-10-CM

## 2020-04-29 DIAGNOSIS — C90.01 MULTIPLE MYELOMA IN REMISSION: Primary | ICD-10-CM

## 2020-04-29 RX ORDER — ONDANSETRON 4 MG/1
4 TABLET, FILM COATED ORAL EVERY 6 HOURS PRN
Qty: 30 TABLET | Refills: 1 | Status: SHIPPED | OUTPATIENT
Start: 2020-04-29 | End: 2021-04-29

## 2020-04-29 NOTE — TELEPHONE ENCOUNTER
"----- Message from Carol Mendosa sent at 4/29/2020 11:03 AM CDT -----  Contact: Queenie  Returning a Missed Scheduling Call    Contact Preference: [phone]  Patient returning phone call to: Maegan Pederson        Appointment Type:  Provider: Jere Tineo MD   Does patient feel the need to be seen today? No    Additional Notes:  "Thank you for all that you do for our patients'"  "

## 2020-04-29 NOTE — TELEPHONE ENCOUNTER
----- Message from Jere Tineo MD sent at 4/28/2020  3:32 PM CDT -----  -Cbc, cmp, bone marrow biopsy in clinic, and velcade injection one day next week where np has open slot  -cbc, cmp, serum free light chains, quantitative immunoglobulins, serum electropheresis, serum immunofixation, np appt and velcade injection 2 weeks from previous velcade injection   -please call pt to schedule

## 2020-05-01 ENCOUNTER — PATIENT MESSAGE (OUTPATIENT)
Dept: HEMATOLOGY/ONCOLOGY | Facility: CLINIC | Age: 70
End: 2020-05-01

## 2020-05-05 ENCOUNTER — LAB VISIT (OUTPATIENT)
Dept: LAB | Facility: HOSPITAL | Age: 70
End: 2020-05-05
Payer: MEDICARE

## 2020-05-05 ENCOUNTER — CLINICAL SUPPORT (OUTPATIENT)
Dept: HEMATOLOGY/ONCOLOGY | Facility: CLINIC | Age: 70
End: 2020-05-05
Payer: MEDICARE

## 2020-05-05 DIAGNOSIS — C90.00 MULTIPLE MYELOMA NOT HAVING ACHIEVED REMISSION: ICD-10-CM

## 2020-05-05 DIAGNOSIS — C90.01 MULTIPLE MYELOMA IN REMISSION: ICD-10-CM

## 2020-05-05 DIAGNOSIS — Z13.9 SCREENING FOR UNSPECIFIED CONDITION: ICD-10-CM

## 2020-05-05 DIAGNOSIS — Z51.11 ENCOUNTER FOR ANTINEOPLASTIC CHEMOTHERAPY: ICD-10-CM

## 2020-05-05 LAB
ALBUMIN SERPL BCP-MCNC: 3.7 G/DL (ref 3.5–5.2)
ALP SERPL-CCNC: 72 U/L (ref 55–135)
ALT SERPL W/O P-5'-P-CCNC: 22 U/L (ref 10–44)
ANION GAP SERPL CALC-SCNC: 12 MMOL/L (ref 8–16)
AST SERPL-CCNC: 28 U/L (ref 10–40)
BASOPHILS # BLD AUTO: 0.02 K/UL (ref 0–0.2)
BASOPHILS NFR BLD: 0.6 % (ref 0–1.9)
BILIRUB SERPL-MCNC: 0.4 MG/DL (ref 0.1–1)
BUN SERPL-MCNC: 15 MG/DL (ref 8–23)
CALCIUM SERPL-MCNC: 9.2 MG/DL (ref 8.7–10.5)
CHLORIDE SERPL-SCNC: 108 MMOL/L (ref 95–110)
CO2 SERPL-SCNC: 26 MMOL/L (ref 23–29)
CREAT SERPL-MCNC: 1 MG/DL (ref 0.5–1.4)
DIFFERENTIAL METHOD: ABNORMAL
EOSINOPHIL # BLD AUTO: 0.1 K/UL (ref 0–0.5)
EOSINOPHIL NFR BLD: 2.8 % (ref 0–8)
ERYTHROCYTE [DISTWIDTH] IN BLOOD BY AUTOMATED COUNT: 18.6 % (ref 11.5–14.5)
EST. GFR  (AFRICAN AMERICAN): >60 ML/MIN/1.73 M^2
EST. GFR  (NON AFRICAN AMERICAN): 56.8 ML/MIN/1.73 M^2
GLUCOSE SERPL-MCNC: 102 MG/DL (ref 70–110)
HCT VFR BLD AUTO: 38.6 % (ref 37–48.5)
HGB BLD-MCNC: 11.4 G/DL (ref 12–16)
IMM GRANULOCYTES # BLD AUTO: 0.01 K/UL (ref 0–0.04)
IMM GRANULOCYTES NFR BLD AUTO: 0.3 % (ref 0–0.5)
LYMPHOCYTES # BLD AUTO: 0.6 K/UL (ref 1–4.8)
LYMPHOCYTES NFR BLD: 17.9 % (ref 18–48)
MCH RBC QN AUTO: 25.9 PG (ref 27–31)
MCHC RBC AUTO-ENTMCNC: 29.5 G/DL (ref 32–36)
MCV RBC AUTO: 88 FL (ref 82–98)
MONOCYTES # BLD AUTO: 0.5 K/UL (ref 0.3–1)
MONOCYTES NFR BLD: 13.4 % (ref 4–15)
NEUTROPHILS # BLD AUTO: 2.3 K/UL (ref 1.8–7.7)
NEUTROPHILS NFR BLD: 65 % (ref 38–73)
NRBC BLD-RTO: 0 /100 WBC
PLATELET # BLD AUTO: 180 K/UL (ref 150–350)
PMV BLD AUTO: 10.8 FL (ref 9.2–12.9)
POTASSIUM SERPL-SCNC: 3.4 MMOL/L (ref 3.5–5.1)
PROT SERPL-MCNC: 7.5 G/DL (ref 6–8.4)
RBC # BLD AUTO: 4.41 M/UL (ref 4–5.4)
SARS-COV-2 RNA RESP QL NAA+PROBE: NOT DETECTED
SODIUM SERPL-SCNC: 146 MMOL/L (ref 136–145)
WBC # BLD AUTO: 3.52 K/UL (ref 3.9–12.7)

## 2020-05-05 PROCEDURE — 80053 COMPREHEN METABOLIC PANEL: CPT | Mod: HCNC

## 2020-05-05 PROCEDURE — 85025 COMPLETE CBC W/AUTO DIFF WBC: CPT | Mod: HCNC

## 2020-05-05 PROCEDURE — 36415 COLL VENOUS BLD VENIPUNCTURE: CPT | Mod: HCNC

## 2020-05-05 PROCEDURE — U0002 COVID-19 LAB TEST NON-CDC: HCPCS | Mod: HCNC

## 2020-05-06 ENCOUNTER — OFFICE VISIT (OUTPATIENT)
Dept: HEMATOLOGY/ONCOLOGY | Facility: CLINIC | Age: 70
End: 2020-05-06
Payer: MEDICARE

## 2020-05-06 ENCOUNTER — INFUSION (OUTPATIENT)
Dept: INFUSION THERAPY | Facility: HOSPITAL | Age: 70
End: 2020-05-06
Attending: INTERNAL MEDICINE
Payer: MEDICARE

## 2020-05-06 VITALS
SYSTOLIC BLOOD PRESSURE: 137 MMHG | HEART RATE: 68 BPM | DIASTOLIC BLOOD PRESSURE: 80 MMHG | RESPIRATION RATE: 16 BRPM | TEMPERATURE: 98 F | OXYGEN SATURATION: 96 %

## 2020-05-06 VITALS
HEIGHT: 63 IN | DIASTOLIC BLOOD PRESSURE: 69 MMHG | SYSTOLIC BLOOD PRESSURE: 145 MMHG | HEART RATE: 65 BPM | BODY MASS INDEX: 28.29 KG/M2 | WEIGHT: 159.63 LBS

## 2020-05-06 DIAGNOSIS — C90.01 MULTIPLE MYELOMA IN REMISSION: Primary | ICD-10-CM

## 2020-05-06 DIAGNOSIS — Z94.81 STATUS POST AUTOLOGOUS BONE MARROW TRANSPLANT: ICD-10-CM

## 2020-05-06 DIAGNOSIS — C90.00 MULTIPLE MYELOMA NOT HAVING ACHIEVED REMISSION: ICD-10-CM

## 2020-05-06 PROCEDURE — 88305 TISSUE EXAM BY PATHOLOGIST: CPT | Mod: 26,HCNC,BMT, | Performed by: PATHOLOGY

## 2020-05-06 PROCEDURE — 88274 CYTOGENETICS 25-99: CPT | Mod: HCNC

## 2020-05-06 PROCEDURE — 96401 CHEMO ANTI-NEOPL SQ/IM: CPT | Mod: HCNC

## 2020-05-06 PROCEDURE — 88311 DECALCIFY TISSUE: CPT | Mod: 26,HCNC,BMT, | Performed by: PATHOLOGY

## 2020-05-06 PROCEDURE — 88313 PR  SPECIAL STAINS,GROUP II: ICD-10-PCS | Mod: 26,HCNC,BMT, | Performed by: PATHOLOGY

## 2020-05-06 PROCEDURE — 88189 PR  FLOWCYTOMETRY/READ, 16 & > MARKERS: ICD-10-PCS | Mod: HCNC,BMT,, | Performed by: PATHOLOGY

## 2020-05-06 PROCEDURE — 38222 PR BONE MARROW BIOPSY(IES) W/ASPIRATION(S); DIAGNOSTIC: ICD-10-PCS | Mod: HCNC,BMT,LT,S$GLB | Performed by: NURSE PRACTITIONER

## 2020-05-06 PROCEDURE — 99999 PR PBB SHADOW E&M-EST. PATIENT-LVL II: CPT | Mod: PBBFAC,HCNC,BMT, | Performed by: NURSE PRACTITIONER

## 2020-05-06 PROCEDURE — 99499 UNLISTED E&M SERVICE: CPT | Mod: HCNC,BMT,S$GLB, | Performed by: NURSE PRACTITIONER

## 2020-05-06 PROCEDURE — 88342 CHG IMMUNOCYTOCHEMISTRY: ICD-10-PCS | Mod: 26,59,HCNC,BMT | Performed by: PATHOLOGY

## 2020-05-06 PROCEDURE — 99999 PR PBB SHADOW E&M-EST. PATIENT-LVL II: ICD-10-PCS | Mod: PBBFAC,HCNC,BMT, | Performed by: NURSE PRACTITIONER

## 2020-05-06 PROCEDURE — 88313 SPECIAL STAINS GROUP 2: CPT | Mod: 26,HCNC,BMT, | Performed by: PATHOLOGY

## 2020-05-06 PROCEDURE — 63600175 PHARM REV CODE 636 W HCPCS: Mod: JG,HCNC | Performed by: INTERNAL MEDICINE

## 2020-05-06 PROCEDURE — 88311 DECALCIFY TISSUE: CPT | Mod: HCNC | Performed by: PATHOLOGY

## 2020-05-06 PROCEDURE — 38222 DX BONE MARROW BX & ASPIR: CPT | Mod: HCNC,BMT,LT,S$GLB | Performed by: NURSE PRACTITIONER

## 2020-05-06 PROCEDURE — 88189 FLOWCYTOMETRY/READ 16 & >: CPT | Mod: HCNC,BMT,, | Performed by: PATHOLOGY

## 2020-05-06 PROCEDURE — 88264 CHROMOSOME ANALYSIS 20-25: CPT | Mod: 59,HCNC

## 2020-05-06 PROCEDURE — 88342 IMHCHEM/IMCYTCHM 1ST ANTB: CPT | Mod: HCNC | Performed by: PATHOLOGY

## 2020-05-06 PROCEDURE — 88271 CYTOGENETICS DNA PROBE: CPT | Mod: 59,HCNC

## 2020-05-06 PROCEDURE — 85097 PR  BONE MARROW,SMEAR INTERPRETATION: ICD-10-PCS | Mod: HCNC,BMT,, | Performed by: PATHOLOGY

## 2020-05-06 PROCEDURE — 99499 RISK ADDL DX/OHS AUDIT: ICD-10-PCS | Mod: HCNC,BMT,S$GLB, | Performed by: NURSE PRACTITIONER

## 2020-05-06 PROCEDURE — 88185 FLOWCYTOMETRY/TC ADD-ON: CPT | Mod: 59,HCNC | Performed by: PATHOLOGY

## 2020-05-06 PROCEDURE — 88342 IMHCHEM/IMCYTCHM 1ST ANTB: CPT | Mod: 26,59,HCNC,BMT | Performed by: PATHOLOGY

## 2020-05-06 PROCEDURE — 88305 TISSUE EXAM BY PATHOLOGIST: ICD-10-PCS | Mod: 26,HCNC,BMT, | Performed by: PATHOLOGY

## 2020-05-06 PROCEDURE — 88305 TISSUE EXAM BY PATHOLOGIST: CPT | Mod: 59,HCNC | Performed by: PATHOLOGY

## 2020-05-06 PROCEDURE — 85097 BONE MARROW INTERPRETATION: CPT | Mod: HCNC,BMT,, | Performed by: PATHOLOGY

## 2020-05-06 PROCEDURE — 88237 TISSUE CULTURE BONE MARROW: CPT | Mod: HCNC

## 2020-05-06 PROCEDURE — 88184 FLOWCYTOMETRY/ TC 1 MARKER: CPT | Mod: HCNC | Performed by: PATHOLOGY

## 2020-05-06 PROCEDURE — 88311 PR  DECALCIFY TISSUE: ICD-10-PCS | Mod: 26,HCNC,BMT, | Performed by: PATHOLOGY

## 2020-05-06 PROCEDURE — 88313 SPECIAL STAINS GROUP 2: CPT | Mod: 59,HCNC | Performed by: PATHOLOGY

## 2020-05-06 RX ORDER — POTASSIUM CHLORIDE 20 MEQ/1
20 TABLET, EXTENDED RELEASE ORAL DAILY
Qty: 30 TABLET | Refills: 11 | Status: SHIPPED | OUTPATIENT
Start: 2020-05-06 | End: 2020-10-07 | Stop reason: SDUPTHER

## 2020-05-06 RX ORDER — BORTEZOMIB 3.5 MG/1
1.3 INJECTION, POWDER, LYOPHILIZED, FOR SOLUTION INTRAVENOUS; SUBCUTANEOUS
Status: COMPLETED | OUTPATIENT
Start: 2020-05-06 | End: 2020-05-06

## 2020-05-06 RX ADMIN — BORTEZOMIB 2.3 MG: 3.5 INJECTION, POWDER, LYOPHILIZED, FOR SOLUTION INTRAVENOUS; SUBCUTANEOUS at 12:05

## 2020-05-06 NOTE — PROGRESS NOTES
Patient presented for planned BMBx without sedation. Tolerated procedure well.    Thuy Nelson, FNP  Hematology/Oncology/Bone Marrow Transplant

## 2020-05-06 NOTE — NURSING
Patient here for velcade injection-patient restarting after bone marrow transplant-complains of pain in feet-tolerated injection well.

## 2020-05-06 NOTE — PROCEDURES
Procedures   PROCEDURE NOTE:  Date of Procedure: 05/06/2020  Bone Marrow Biopsy and Aspiration  Indication: multiple myeloma. Day +84 s/p auto SCT  Consent: Informed consent was obtained from patient.  Timeout: Done and documented.  Position: prone  Site: Left posterior illiac crest.  Prep: Betadine.  Needle used: 11 gauge Jamshidi needle.  Anesthetic: 1% lidocaine 5 cc.  Biopsy: The biopsy needle was introduced into the marrow cavity and an aspirate was obtained without complications and sent for flow cytometry and cytogenetics and PCPD FISH. Core biopsy obtained without difficulty and sent for routine histologic examination.  Complications: None.  Disposition: Patient was left in the room with RN and instructed to lie on back for 20 minutes. Instructed to keep dressing dry and intact for 24 hours.  Blood loss: Minimal.     Thuy Nelson, SARAVANANP  Hematology/Oncology/Bone Marrow Transplant

## 2020-05-08 DIAGNOSIS — C90.00 MULTIPLE MYELOMA, REMISSION STATUS UNSPECIFIED: Primary | ICD-10-CM

## 2020-05-08 LAB
BODY SITE - BONE MARROW: NORMAL
CLINICAL DIAGNOSIS - BONE MARROW: NORMAL
FLOW CYTOMETRY ANTIBODIES ANALYZED - BONE MARROW: NORMAL
FLOW CYTOMETRY COMMENT - BONE MARROW: NORMAL
FLOW CYTOMETRY INTERPRETATION - BONE MARROW: NORMAL

## 2020-05-18 ENCOUNTER — LAB VISIT (OUTPATIENT)
Dept: LAB | Facility: HOSPITAL | Age: 70
End: 2020-05-18
Payer: MEDICARE

## 2020-05-18 ENCOUNTER — CLINICAL SUPPORT (OUTPATIENT)
Dept: HEMATOLOGY/ONCOLOGY | Facility: CLINIC | Age: 70
End: 2020-05-18
Payer: MEDICARE

## 2020-05-18 DIAGNOSIS — Z51.11 ENCOUNTER FOR ANTINEOPLASTIC CHEMOTHERAPY: ICD-10-CM

## 2020-05-18 DIAGNOSIS — C90.01 MULTIPLE MYELOMA IN REMISSION: ICD-10-CM

## 2020-05-18 DIAGNOSIS — Z13.9 SCREENING FOR UNSPECIFIED CONDITION: ICD-10-CM

## 2020-05-18 DIAGNOSIS — C90.00 MULTIPLE MYELOMA NOT HAVING ACHIEVED REMISSION: ICD-10-CM

## 2020-05-18 PROBLEM — D63.0 ANEMIA IN NEOPLASTIC DISEASE: Status: ACTIVE | Noted: 2020-05-18

## 2020-05-18 LAB
ALBUMIN SERPL BCP-MCNC: 4 G/DL (ref 3.5–5.2)
ALP SERPL-CCNC: 71 U/L (ref 55–135)
ALT SERPL W/O P-5'-P-CCNC: 27 U/L (ref 10–44)
ANION GAP SERPL CALC-SCNC: 9 MMOL/L (ref 8–16)
AST SERPL-CCNC: 32 U/L (ref 10–40)
BASOPHILS # BLD AUTO: 0.03 K/UL (ref 0–0.2)
BASOPHILS NFR BLD: 0.8 % (ref 0–1.9)
BILIRUB SERPL-MCNC: 0.5 MG/DL (ref 0.1–1)
BUN SERPL-MCNC: 11 MG/DL (ref 8–23)
CALCIUM SERPL-MCNC: 9.2 MG/DL (ref 8.7–10.5)
CHLORIDE SERPL-SCNC: 107 MMOL/L (ref 95–110)
CHROM BANDING METHOD: NORMAL
CHROMOSOME ANALYSIS BM ADDITIONAL INFORMATION: NORMAL
CHROMOSOME ANALYSIS BM RELEASED BY: NORMAL
CHROMOSOME ANALYSIS BM RESULT SUMMARY: NORMAL
CLINICAL CYTOGENETICIST REVIEW: NORMAL
CO2 SERPL-SCNC: 28 MMOL/L (ref 23–29)
COMMENT: NORMAL
CREAT SERPL-MCNC: 1 MG/DL (ref 0.5–1.4)
DIFFERENTIAL METHOD: ABNORMAL
EOSINOPHIL # BLD AUTO: 0.1 K/UL (ref 0–0.5)
EOSINOPHIL NFR BLD: 2.7 % (ref 0–8)
ERYTHROCYTE [DISTWIDTH] IN BLOOD BY AUTOMATED COUNT: 18.5 % (ref 11.5–14.5)
EST. GFR  (AFRICAN AMERICAN): >60 ML/MIN/1.73 M^2
EST. GFR  (NON AFRICAN AMERICAN): 56.8 ML/MIN/1.73 M^2
FINAL PATHOLOGIC DIAGNOSIS: NORMAL
GLUCOSE SERPL-MCNC: 97 MG/DL (ref 70–110)
GROSS: NORMAL
HCT VFR BLD AUTO: 38 % (ref 37–48.5)
HGB BLD-MCNC: 11.2 G/DL (ref 12–16)
IGA SERPL-MCNC: 79 MG/DL (ref 40–350)
IGG SERPL-MCNC: 1370 MG/DL (ref 650–1600)
IGM SERPL-MCNC: 40 MG/DL (ref 50–300)
IMM GRANULOCYTES # BLD AUTO: 0.02 K/UL (ref 0–0.04)
IMM GRANULOCYTES NFR BLD AUTO: 0.5 % (ref 0–0.5)
KARYOTYP MAR: NORMAL
LYMPHOCYTES # BLD AUTO: 0.6 K/UL (ref 1–4.8)
LYMPHOCYTES NFR BLD: 16.8 % (ref 18–48)
MCH RBC QN AUTO: 25.7 PG (ref 27–31)
MCHC RBC AUTO-ENTMCNC: 29.5 G/DL (ref 32–36)
MCV RBC AUTO: 87 FL (ref 82–98)
MICROSCOPIC EXAM: NORMAL
MONOCYTES # BLD AUTO: 0.4 K/UL (ref 0.3–1)
MONOCYTES NFR BLD: 11.9 % (ref 4–15)
NEUTROPHILS # BLD AUTO: 2.5 K/UL (ref 1.8–7.7)
NEUTROPHILS NFR BLD: 67.3 % (ref 38–73)
NRBC BLD-RTO: 0 /100 WBC
PLATELET # BLD AUTO: 185 K/UL (ref 150–350)
PMV BLD AUTO: 11.2 FL (ref 9.2–12.9)
POTASSIUM SERPL-SCNC: 3.5 MMOL/L (ref 3.5–5.1)
PROT SERPL-MCNC: 7.8 G/DL (ref 6–8.4)
RBC # BLD AUTO: 4.35 M/UL (ref 4–5.4)
REASON FOR REFERRAL (NARRATIVE): NORMAL
REF LAB TEST METHOD: NORMAL
SARS-COV-2 RNA RESP QL NAA+PROBE: NOT DETECTED
SODIUM SERPL-SCNC: 144 MMOL/L (ref 136–145)
SPECIMEN SOURCE: NORMAL
SPECIMEN: NORMAL
SUPPLEMENTAL DIAGNOSIS: NORMAL
WBC # BLD AUTO: 3.69 K/UL (ref 3.9–12.7)

## 2020-05-18 PROCEDURE — 86334 IMMUNOFIX E-PHORESIS SERUM: CPT | Mod: HCNC

## 2020-05-18 PROCEDURE — 80053 COMPREHEN METABOLIC PANEL: CPT | Mod: HCNC

## 2020-05-18 PROCEDURE — 83520 IMMUNOASSAY QUANT NOS NONAB: CPT | Mod: 59,HCNC

## 2020-05-18 PROCEDURE — 82784 ASSAY IGA/IGD/IGG/IGM EACH: CPT | Mod: 59,HCNC

## 2020-05-18 PROCEDURE — U0003 INFECTIOUS AGENT DETECTION BY NUCLEIC ACID (DNA OR RNA); SEVERE ACUTE RESPIRATORY SYNDROME CORONAVIRUS 2 (SARS-COV-2) (CORONAVIRUS DISEASE [COVID-19]), AMPLIFIED PROBE TECHNIQUE, MAKING USE OF HIGH THROUGHPUT TECHNOLOGIES AS DESCRIBED BY CMS-2020-01-R: HCPCS | Mod: HCNC

## 2020-05-18 PROCEDURE — 36415 COLL VENOUS BLD VENIPUNCTURE: CPT | Mod: HCNC

## 2020-05-18 PROCEDURE — 86334 PATHOLOGIST INTERPRETATION IFE: ICD-10-PCS | Mod: 26,HCNC,BMT, | Performed by: PATHOLOGY

## 2020-05-18 PROCEDURE — 84165 PROTEIN E-PHORESIS SERUM: CPT | Mod: 26,HCNC,BMT, | Performed by: PATHOLOGY

## 2020-05-18 PROCEDURE — 86334 IMMUNOFIX E-PHORESIS SERUM: CPT | Mod: 26,HCNC,BMT, | Performed by: PATHOLOGY

## 2020-05-18 PROCEDURE — 84165 PATHOLOGIST INTERPRETATION SPE: ICD-10-PCS | Mod: 26,HCNC,BMT, | Performed by: PATHOLOGY

## 2020-05-18 PROCEDURE — 85025 COMPLETE CBC W/AUTO DIFF WBC: CPT | Mod: HCNC

## 2020-05-18 PROCEDURE — 84165 PROTEIN E-PHORESIS SERUM: CPT | Mod: HCNC

## 2020-05-19 LAB
ALBUMIN SERPL ELPH-MCNC: 4.45 G/DL (ref 3.35–5.55)
ALPHA1 GLOB SERPL ELPH-MCNC: 0.29 G/DL (ref 0.17–0.41)
ALPHA2 GLOB SERPL ELPH-MCNC: 0.88 G/DL (ref 0.43–0.99)
B-GLOBULIN SERPL ELPH-MCNC: 0.76 G/DL (ref 0.5–1.1)
GAMMA GLOB SERPL ELPH-MCNC: 1.32 G/DL (ref 0.67–1.58)
INTERPRETATION SERPL IFE-IMP: NORMAL
KAPPA LC SER QL IA: 3.68 MG/DL (ref 0.33–1.94)
KAPPA LC/LAMBDA SER IA: 2.79 (ref 0.26–1.65)
LAMBDA LC SER QL IA: 1.32 MG/DL (ref 0.57–2.63)
PATHOLOGIST INTERPRETATION IFE: NORMAL
PATHOLOGIST INTERPRETATION SPE: NORMAL
PROT SERPL-MCNC: 7.7 G/DL (ref 6–8.4)

## 2020-05-20 ENCOUNTER — INFUSION (OUTPATIENT)
Dept: INFUSION THERAPY | Facility: HOSPITAL | Age: 70
End: 2020-05-20
Attending: NURSE PRACTITIONER
Payer: MEDICARE

## 2020-05-20 ENCOUNTER — OFFICE VISIT (OUTPATIENT)
Dept: HEMATOLOGY/ONCOLOGY | Facility: CLINIC | Age: 70
End: 2020-05-20
Payer: MEDICARE

## 2020-05-20 VITALS
HEART RATE: 91 BPM | HEIGHT: 63 IN | BODY MASS INDEX: 29.14 KG/M2 | SYSTOLIC BLOOD PRESSURE: 137 MMHG | WEIGHT: 164.44 LBS | DIASTOLIC BLOOD PRESSURE: 81 MMHG | TEMPERATURE: 98 F

## 2020-05-20 DIAGNOSIS — E78.00 PURE HYPERCHOLESTEROLEMIA: ICD-10-CM

## 2020-05-20 DIAGNOSIS — I69.30 HISTORY OF CVA WITH RESIDUAL DEFICIT: ICD-10-CM

## 2020-05-20 DIAGNOSIS — D63.0 ANEMIA IN NEOPLASTIC DISEASE: ICD-10-CM

## 2020-05-20 DIAGNOSIS — C90.01 MULTIPLE MYELOMA IN REMISSION: Primary | ICD-10-CM

## 2020-05-20 DIAGNOSIS — Z85.3 PERSONAL HISTORY OF MALIGNANT NEOPLASM OF BREAST: ICD-10-CM

## 2020-05-20 DIAGNOSIS — R94.2 ABNORMAL PFT: ICD-10-CM

## 2020-05-20 DIAGNOSIS — Z94.81 STATUS POST AUTOLOGOUS BONE MARROW TRANSPLANT: ICD-10-CM

## 2020-05-20 DIAGNOSIS — F33.42 RECURRENT MAJOR DEPRESSIVE DISORDER, IN FULL REMISSION: ICD-10-CM

## 2020-05-20 DIAGNOSIS — R16.0 LIVER MASS: ICD-10-CM

## 2020-05-20 DIAGNOSIS — C90.00 MULTIPLE MYELOMA NOT HAVING ACHIEVED REMISSION: ICD-10-CM

## 2020-05-20 DIAGNOSIS — E11.9 TYPE 2 DIABETES MELLITUS WITHOUT COMPLICATION, WITHOUT LONG-TERM CURRENT USE OF INSULIN: ICD-10-CM

## 2020-05-20 DIAGNOSIS — G62.0 DRUG-INDUCED POLYNEUROPATHY: ICD-10-CM

## 2020-05-20 DIAGNOSIS — I10 ESSENTIAL HYPERTENSION: ICD-10-CM

## 2020-05-20 PROCEDURE — 1159F PR MEDICATION LIST DOCUMENTED IN MEDICAL RECORD: ICD-10-PCS | Mod: HCNC,BMT,95, | Performed by: NURSE PRACTITIONER

## 2020-05-20 PROCEDURE — 1159F MED LIST DOCD IN RCRD: CPT | Mod: HCNC,BMT,95, | Performed by: NURSE PRACTITIONER

## 2020-05-20 PROCEDURE — 1101F PT FALLS ASSESS-DOCD LE1/YR: CPT | Mod: HCNC,BMT,CPTII,95 | Performed by: NURSE PRACTITIONER

## 2020-05-20 PROCEDURE — 63600175 PHARM REV CODE 636 W HCPCS: Mod: JG,HCNC | Performed by: INTERNAL MEDICINE

## 2020-05-20 PROCEDURE — 96401 CHEMO ANTI-NEOPL SQ/IM: CPT | Mod: HCNC

## 2020-05-20 PROCEDURE — 99443 PR PHYSICIAN TELEPHONE EVALUATION 21-30 MIN: CPT | Mod: HCNC,BMT,95, | Performed by: NURSE PRACTITIONER

## 2020-05-20 PROCEDURE — 1101F PR PT FALLS ASSESS DOC 0-1 FALLS W/OUT INJ PAST YR: ICD-10-PCS | Mod: HCNC,BMT,CPTII,95 | Performed by: NURSE PRACTITIONER

## 2020-05-20 PROCEDURE — 99443 PR PHYSICIAN TELEPHONE EVALUATION 21-30 MIN: ICD-10-PCS | Mod: HCNC,BMT,95, | Performed by: NURSE PRACTITIONER

## 2020-05-20 RX ORDER — HEPARIN 100 UNIT/ML
500 SYRINGE INTRAVENOUS
Status: CANCELLED | OUTPATIENT
Start: 2020-06-03

## 2020-05-20 RX ORDER — SODIUM CHLORIDE 0.9 % (FLUSH) 0.9 %
10 SYRINGE (ML) INJECTION
Status: CANCELLED | OUTPATIENT
Start: 2020-06-03

## 2020-05-20 RX ORDER — HEPARIN 100 UNIT/ML
500 SYRINGE INTRAVENOUS
Status: CANCELLED | OUTPATIENT
Start: 2020-05-20

## 2020-05-20 RX ORDER — BORTEZOMIB 3.5 MG/1
1.3 INJECTION, POWDER, LYOPHILIZED, FOR SOLUTION INTRAVENOUS; SUBCUTANEOUS
Status: COMPLETED | OUTPATIENT
Start: 2020-05-20 | End: 2020-05-20

## 2020-05-20 RX ORDER — BORTEZOMIB 3.5 MG/1
1.3 INJECTION, POWDER, LYOPHILIZED, FOR SOLUTION INTRAVENOUS; SUBCUTANEOUS
Status: CANCELLED | OUTPATIENT
Start: 2020-05-20

## 2020-05-20 RX ORDER — BORTEZOMIB 3.5 MG/1
1.3 INJECTION, POWDER, LYOPHILIZED, FOR SOLUTION INTRAVENOUS; SUBCUTANEOUS
Status: CANCELLED | OUTPATIENT
Start: 2020-06-03

## 2020-05-20 RX ORDER — SODIUM CHLORIDE 0.9 % (FLUSH) 0.9 %
10 SYRINGE (ML) INJECTION
Status: CANCELLED | OUTPATIENT
Start: 2020-05-20

## 2020-05-20 RX ADMIN — BORTEZOMIB 2.3 MG: 3.5 INJECTION, POWDER, LYOPHILIZED, FOR SOLUTION INTRAVENOUS; SUBCUTANEOUS at 01:05

## 2020-05-20 NOTE — Clinical Note
Cbc, cmp, and velcade injection in 2 weekscbc, cmp, serum free light chains, quantitative immunoglobulins, serum electropheresis, serum immunofixation, appt with Dr. Tineo or NP, and velcade injection in 4 weeksLabs and COVID testing should be scheduled 1-2 days prior to clinic appts and velcade injections.

## 2020-05-20 NOTE — PROGRESS NOTES
Established Patient - Audio Only Telehealth Visit     The patient location is: home  The chief complaint leading to consultation is: MM s/p auto SCT  Visit type: Virtual visit with audio only (telephone)  Total time spent with patient: 10 minutes  Total time spent preparing for visit and with patient: 30 minutes       The reason for the audio only service rather than synchronous audio and video virtual visit was related to technical difficulties or patient preference/necessity.     Each patient to whom I provide medical services by telemedicine is:  (1) informed of the relationship between the physician and patient and the respective role of any other health care provider with respect to management of the patient; and (2) notified that they may decline to receive medical services by telemedicine and may withdraw from such care at any time. Patient verbally consented to receive this service via voice-only telephone call.      Notes:     PATIENT: Shelby Thompson  MRN: [id number]  DATE: 3/4/2020    Chief Complaint: No chief complaint on file.      Oncologic History:     Multiple Myeloma- presented w CRAB criteria (Hgb 7.1, hypercalcemia, renal function - Cr of 2.7, T7 vertebral fracture)  IgG Kappa, RISS Stage III (beta-2 micro globulin of 17.5 and 14:20 translocation) High Risk disease  She has achieved and tolerated well VRd x completing 7, 28 day cycles and achieving deep VGPR'  last dose velcade 10/31/19.    Extensive PMH as below.    2 prior CVAs (one in 2008, one in 2011)  L breast cancer DCIS stage 0 (s/p lumpectomy and radiation, no endocrine tx due to CVA)  HTN  DM II  Neuropathy, b/l hands  Prior smoker, quit in 2011  Hepatic lesions - vascular per recent MRI in 09 /2019 with no changes; will confirm no further rascon needed from hep  Statin Intolerance - on praluent, PCSK9 inhibitor   HFpEF - EF 55%, diastolic dysfunction  Anxiety/Depression    ADMISSION SUMMARY: 2/10-2/26/2020  Admitted 2/10, tolerated chemo and  transplant well. Engrafted on day +12. Remained afebrile throughout hospital stay and no mucositis. Experienced expected side effects of nausea and diarrhea controlled with antiemetics and Imodium. Discharged home with fernando PT/OT     Today: Ms. Thompson is a 70 y.o. female patient of Dr. Tineo's who presents virtually (audio only) today for C2D1 of maintenance Velcade. Tolerated C1 well. She is Day +98 from a [facility] Auto SCT. She had her Day +100 BMBx on 5/6/20, which showed no residual disease. She is feeling well overall today. She denies fevers, chills, chest pain, cough, other s/s of COVID, and n/v/d/c. She continues with chronic neuropathy, fatigue, and SOB on exertion.      Past Medical History:   Past Medical History:   Diagnosis Date    Acute respiratory failure with hypoxia 4/30/2019    FUENTES (acute kidney injury) 5/1/2019    Allergy     Anemia     Aortic aneurysm     Breast cancer 10/2011    left breast Stage 0 DCIS    Chronic diastolic congestive heart failure 11/6/2015    Colon polyp     GERD (gastroesophageal reflux disease)     Hiatal hernia     History of colonic polyps     Hx of psychiatric care     Zoloft    HX: breast cancer     Hyperlipemia     Hypertension     ICH (intracerebral hemorrhage)     Major depressive disorder, single episode, mild 6/23/2016    Nuclear sclerosis 7/21/2014    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 7/21/2014    PAD (peripheral artery disease) 11/6/2015    Psychiatric problem     Statin-induced rhabdomyolysis     4/2015     Stroke 4/2011    Type 2 diabetes mellitus without complication, without long-term current use of insulin 2/10/2020    Walker as ambulation aid        Past Surgical HIstory:   Past Surgical History:   Procedure Laterality Date    APPENDECTOMY      BONE MARROW BIOPSY Left 10/3/2019    Procedure: Biopsy-bone marrow;  Surgeon: Jere Tineo MD;  Location: [facility] OR [address] [place] [address];  Service: Oncology;   Laterality: Left;    BREAST BIOPSY Left 10/2011    BREAST LUMPECTOMY Left 2011    DCIS    COLONOSCOPY      COLONOSCOPY N/A 1/9/2020    Procedure: COLONOSCOPY;  Surgeon: Quang De La Cruz MD;  Location: [facility] ([facility]  Service: Endoscopy;  Laterality: N/A;    CYST REMOVAL      back    ESOPHAGOGASTRODUODENOSCOPY  07/2016    duodenal angioectasia    ESOPHAGOGASTRODUODENOSCOPY N/A 1/9/2020    Procedure: EGD (ESOPHAGOGASTRODUODENOSCOPY);  Surgeon: Quang De La Cruz MD;  Location: [facility] ([address] [place] [address]);  Service: Endoscopy;  Laterality: N/A;    FOOT FRACTURE SURGERY  10/2012    right foot, with R hallux valgus repair    FRACTURE SURGERY Right     broken toe repiar    HEMORRHOID SURGERY      LIVER BIOPSY  04/2015    essentially normal, no fibrosis    TUBAL LIGATION      UPPER GASTROINTESTINAL ENDOSCOPY         Family History:   Family History   Problem Relation Age of Onset    No Known Problems Sister     Cancer Sister 63        Lung Cancer    No Known Problems Mother     Cancer Father     No Known Problems Brother     Hypertension Daughter     Fibroids Daughter         uterine    Hypertension Son     Breast cancer Sister 62    No Known Problems Brother     No Known Problems Maternal Aunt     No Known Problems Maternal Uncle     No Known Problems Paternal Aunt     No Known Problems Paternal Uncle     Hypertension Maternal Grandmother     No Known Problems Maternal Grandfather     No Known Problems Paternal Grandmother     No Known Problems Paternal Grandfather     Ovarian cancer Neg Hx     Colon cancer Neg Hx     Tremor Neg Hx     Amblyopia Neg Hx     Blindness Neg Hx     Cataracts Neg Hx     Diabetes Neg Hx     Glaucoma Neg Hx     Macular degeneration Neg Hx     Retinal detachment Neg Hx     Strabismus Neg Hx     Stroke Neg Hx     Thyroid disease Neg Hx     Esophageal cancer Neg Hx     Rectal cancer Neg Hx     Stomach cancer Neg Hx     Ulcerative colitis Neg Hx      Crohn's disease Neg Hx     Irritable bowel syndrome Neg Hx     Celiac disease Neg Hx        Social History:  reports that she quit smoking about 9 years ago. Her smoking use included cigarettes. She has a 10.00 pack-year smoking history. She has never used smokeless tobacco. She reports that she drank alcohol. She reports that she does not use drugs.    Allergies:  Review of patient's allergies indicates:   Allergen Reactions    Lipitor [atorvastatin]      Itching, elevated cpk, muscle aches, statin induced myositis       Medications:  Current Outpatient Medications   Medication Sig Dispense Refill    acyclovir (ZOVIRAX) 800 MG Tab Take 1 tablet (800 mg total) by mouth 2 (two) times daily. 60 tablet 11    alirocumab (PRALUENT PEN) 75 mg/mL PnIj Inject 1 mL (75 mg total) into the skin every 14 (fourteen) days. 6 Syringe 3    amLODIPine (NORVASC) 5 MG tablet TAKE 1 TABLET BY MOUTH EVERY DAY 90 tablet 3    calcium-vitamin D3 (OS- + D3) 500 mg(1,250mg) -200 unit per tablet Take 1 tablet by mouth 2 (two) times daily. 60 tablet 0    ferrous gluconate 324 mg (37.5 mg iron) Tab TAKE 1 TABLET BY MOUTH 2 (TWO) TIMES DAILY WITH MEALS. 180 tablet 0    gabapentin (NEURONTIN) 300 MG capsule Take 1 capsule (300mg) by mouth every morning and 2 capsules (600mg) every evening. 90 capsule 5    loratadine (CLARITIN) 10 mg tablet Take 10 mg by mouth nightly.      magnesium oxide (MAG-OX) 400 mg (241.3 mg magnesium) tablet Take 1 tablet (400 mg total) by mouth 2 (two) times daily. 60 tablet 2    omeprazole (PRILOSEC) 40 MG capsule Take 1 capsule (40 mg total) by mouth once daily. 90 capsule 3    ondansetron (ZOFRAN) 4 MG tablet Take 1 tablet (4 mg total) by mouth every 6 (six) hours as needed for Nausea. 30 tablet 1    potassium chloride SA (K-DUR,KLOR-CON) 20 MEQ tablet Take 1 tablet (20 mEq total) by mouth once daily. 30 tablet 11    sertraline (ZOLOFT) 50 MG tablet TAKE 1 TABLET (50 MG TOTAL) BY MOUTH ONCE DAILY.  90 tablet 3    traMADol (ULTRAM) 50 mg tablet Take 1 tablet (50 mg total) by mouth 2 (two) times daily as needed for Pain. 60 tablet 3     No current facility-administered medications for this visit.        Review of Systems   Constitutional: Negative.  Negative for fatigue and unexpected weight change.   HENT: Negative.    Eyes: Negative.    Respiratory: Negative for cough. Positive for SOB on exertion.  Cardiovascular: Negative for chest pain and palpitations.   Gastrointestinal: Negative for abdominal pain, blood in stool, nausea and vomiting, diarrhea, and constipation.   Endocrine: Negative.    Genitourinary: Negative for difficulty urinating and dysuria.   Musculoskeletal: Positive for back pain and gait problem.   Skin: Negative for rash.   Allergic/Immunologic: Positive for immunocompromised state.   Neurological: Positive for tremors, weakness and numbness in hands. Negative for headaches.   Hematological: Negative for adenopathy.       ECOG Performance Status: 1   Objective:      Vitals:   There were no vitals filed for this visit.    Physical exam not performed due to visit type (virtual audio).    Laboratory Data:  Lab Results   Component Value Date    WBC 2.84 (L) 10/15/2019    HGB 9.7 (L) 10/15/2019    HCT 34.5 (L) 10/15/2019    MCV 76 (L) 10/15/2019     10/15/2019       Gran # (ANC)   Date Value Ref Range Status   05/18/2020 2.5 1.8 - 7.7 K/uL Final     Gran%   Date Value Ref Range Status   05/18/2020 67.3 38.0 - 73.0 % Final     CMP  Sodium   Date Value Ref Range Status   05/18/2020 144 136 - 145 mmol/L Final     Potassium   Date Value Ref Range Status   05/18/2020 3.5 3.5 - 5.1 mmol/L Final     Chloride   Date Value Ref Range Status   05/18/2020 107 95 - 110 mmol/L Final     CO2   Date Value Ref Range Status   05/18/2020 28 23 - 29 mmol/L Final     Glucose   Date Value Ref Range Status   05/18/2020 97 70 - 110 mg/dL Final     BUN, Bld   Date Value Ref Range Status   05/18/2020 11 8 - 23  mg/dL Final     Creatinine   Date Value Ref Range Status   05/18/2020 1.0 0.5 - 1.4 mg/dL Final     Calcium   Date Value Ref Range Status   05/18/2020 9.2 8.7 - 10.5 mg/dL Final     Total Protein   Date Value Ref Range Status   05/18/2020 7.8 6.0 - 8.4 g/dL Final     Albumin   Date Value Ref Range Status   05/18/2020 4.0 3.5 - 5.2 g/dL Final     Total Bilirubin   Date Value Ref Range Status   05/18/2020 0.5 0.1 - 1.0 mg/dL Final     Comment:     For infants and newborns, interpretation of results should be based  on gestational age, weight and in agreement with clinical  observations.  Premature Infant recommended reference ranges:  Up to 24 hours.............<8.0 mg/dL  Up to 48 hours............<12.0 mg/dL  3-5 days..................<15.0 mg/dL  6-29 days.................<15.0 mg/dL       Alkaline Phosphatase   Date Value Ref Range Status   05/18/2020 71 55 - 135 U/L Final     AST   Date Value Ref Range Status   05/18/2020 32 10 - 40 U/L Final     ALT   Date Value Ref Range Status   05/18/2020 27 10 - 44 U/L Final     Anion Gap   Date Value Ref Range Status   05/18/2020 9 8 - 16 mmol/L Final     eGFR if    Date Value Ref Range Status   05/18/2020 >60.0 >60 mL/min/1.73 m^2 Final     eGFR if non    Date Value Ref Range Status   05/18/2020 56.8 (A) >60 mL/min/1.73 m^2 Final     Comment:     Calculation used to obtain the estimated glomerular filtration  rate (eGFR) is the CKD-EPI equation.        [place] Free Light Chains   Date Value Ref Range Status   05/18/2020 3.68 (H) 0.33 - 1.94 mg/dL Final   04/24/2020 3.32 (H) 0.33 - 1.94 mg/dL Final   01/02/2020 1.43 0.33 - 1.94 mg/dL Final     Lambda Free Light Chains   Date Value Ref Range Status   05/18/2020 1.32 0.57 - 2.63 mg/dL Final   04/24/2020 1.31 0.57 - 2.63 mg/dL Final   01/02/2020 1.00 0.57 - 2.63 mg/dL Final     Kappa/Lambda FLC Ratio   Date Value Ref Range Status   05/18/2020 2.79 (H) 0.26 - 1.65 Final   04/24/2020 2.53 (H) 0.26 -  1.65 Final   01/02/2020 1.43 0.26 - 1.65 Final     IgG - Serum   Date Value Ref Range Status   05/18/2020 1370 650 - 1600 mg/dL Final     Comment:     IgG Cord Blood Reference Range: 650-1600 mg/dL.     IgA   Date Value Ref Range Status   05/18/2020 79 40 - 350 mg/dL Final     Comment:     IgA Cord Blood Reference Range: <5 mg/dL.     IgM   Date Value Ref Range Status   05/18/2020 40 (L) 50 - 300 mg/dL Final     Comment:     IgM Cord Blood Reference Range: <25 mg/dL.         Assessment:       1. Multiple myeloma in remission    2. Status post autologous bone marrow transplant    3. Anemia in neoplastic disease    4. Drug-induced polyneuropathy    5. History of CVA with residual deficit    6. Personal history of malignant neoplasm of breast    7. Essential hypertension    8. Type 2 diabetes mellitus without complication, without long-term current use of insulin    9. Liver mass    10. Pure hypercholesterolemia    11. Recurrent major depressive disorder, in full remission    12. Abnormal PFT           Plan:      Multiple Myeloma  -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) High Risk disease; complex karyotype by CG; , t(14;20), monosomy 13 on FISH  -She has achieved and tolerated well VRd x completing 7, 28 day cycles and achieving stringent  CR as of dec 2019 marrow/biochemical studies   -adequate response to proceed with autoSCT  -she has PS 1-2 and multiple abnormalities in her pre transplant eval that have been cleared by pulmonology, cardiology, gynecology and are not prohibitive to proceed with transplant  -she has JESSE - sp  one dose of iv feraheme with response     -no further velcade maintenance pre transplant, last dose was 12/24/19  -s/p Verenice 140 autologous stem cell transplant, see below  -she presents virtually today for C2D1 of maintenance Velcade    S/p autologous stem cell transplant candidate  -Today is Day +98  - continue acyclovir  - had a BMBx on  5/6/2020 showing no morphologic or immunophenotypic evidence of residual disease    Regimen as below:  Transplant Day -2: IV fluids  Transplant Day -1 through Day 0: Antiemetics   Transplant Day -1: Melphalan   Transplant Day 0: Stem Cell Infusion   Transplant Day +7: Growth factor begins     Pancytopenia  -hx of JESSE; received single dose feraheme pretransplant  -colonoscopy okay for transplant  -hgb was 11.2 on 5/18/20; plt wnl.  -transfuse for Hgb <7, Plt <10K    F/E  - K+ 3.5, discussed high potassium foods at previous visit  - phos wnl, will stop po phos supplement    Neuropathy  -pt has had numbness/tingling to hands and BUE tremors post CVA, for which she is on gabapentin 600 mg BID  -prn tramadol    Hx of CVA  -one in 2008, one in 2011  -cleared by cardiology for transplant    L breast cancer DCIS stage 0  -s/p lumpectomy and radiation, no endocrine tx due to CVA    HTN  -continue norvasc    DM II  -diet controlled     Hepatic lesions  -vascular per recent MRI in 09/2019 with no changes; seen by hepatology pre-transplant and signed off; recommend repeat MRI in 6 months. Will plan for this in ~ 2 months.    HLD with Statin Intolerance  -on praluent, PCSK9 inhibitor    Anxiety/Depression  -continue zoloft    ILD  - had abnormal PFTs during transplant evaluation 10/2019; seen by pulm; no significant desaturation on 6MWT; cleared by pulm for transplant  - no sob today and O2 sats >92%     FU  Cbc, cmp, and velcade injection in 2 weeks  cbc, cmp, serum free light chains, quantitative immunoglobulins, serum electropheresis, serum immunofixation, appt with Dr. Tineo or NP, and velcade injection in 4 weeks  Labs and COVID testing should be scheduled 1-2 days prior to clinic appts and velcade injections.     This service was not originating from a related E/M service provided within the previous 7 days nor will  to an E/M service or procedure within the next 24 hours or my soonest available appointment.   Prevailing standard of care was able to be met in this audio-only visit.

## 2020-05-21 ENCOUNTER — HOSPITAL ENCOUNTER (OUTPATIENT)
Dept: RADIOLOGY | Facility: HOSPITAL | Age: 70
Discharge: HOME | End: 2020-05-21
Attending: INTERNAL MEDICINE
Payer: MEDICARE

## 2020-05-21 DIAGNOSIS — C90.00 MULTIPLE MYELOMA, REMISSION STATUS UNSPECIFIED: ICD-10-CM

## 2020-05-21 LAB — POCT GLUCOSE: 90 MG/DL (ref 70–110)

## 2020-05-21 PROCEDURE — 78816 PET IMAGE W/CT FULL BODY: CPT | Mod: 26,HCNC,BMT,PS | Performed by: RADIOLOGY

## 2020-05-21 PROCEDURE — 78816 PET IMAGE W/CT FULL BODY: CPT | Mod: TC,HCNC

## 2020-05-21 PROCEDURE — 78816 NM PET CT WHOLE BODY: ICD-10-PCS | Mod: 26,HCNC,BMT,PS | Performed by: RADIOLOGY

## 2020-05-24 RX ORDER — AMLODIPINE BESYLATE 5 MG/1
TABLET ORAL
Qty: 90 TABLET | Refills: 0 | Status: SHIPPED | OUTPATIENT
Start: 2020-05-24 | End: 2020-06-04 | Stop reason: SDUPTHER

## 2020-05-25 DIAGNOSIS — C90.01 MULTIPLE MYELOMA IN REMISSION: Primary | ICD-10-CM

## 2020-06-01 ENCOUNTER — TELEPHONE (OUTPATIENT)
Dept: PHARMACY | Facility: CLINIC | Age: 70
End: 2020-06-01

## 2020-06-01 DIAGNOSIS — Z13.9 ENCOUNTER FOR SCREENING: Primary | ICD-10-CM

## 2020-06-02 ENCOUNTER — CLINICAL SUPPORT (OUTPATIENT)
Dept: HEMATOLOGY/ONCOLOGY | Facility: CLINIC | Age: 70
End: 2020-06-02
Payer: MEDICARE

## 2020-06-02 ENCOUNTER — LAB VISIT (OUTPATIENT)
Dept: LAB | Facility: HOSPITAL | Age: 70
End: 2020-06-02
Payer: MEDICARE

## 2020-06-02 DIAGNOSIS — Z13.9 ENCOUNTER FOR SCREENING: Primary | ICD-10-CM

## 2020-06-02 DIAGNOSIS — Z94.81 STATUS POST AUTOLOGOUS BONE MARROW TRANSPLANT: ICD-10-CM

## 2020-06-02 DIAGNOSIS — C90.01 MULTIPLE MYELOMA IN REMISSION: ICD-10-CM

## 2020-06-02 DIAGNOSIS — Z51.11 ENCOUNTER FOR CHEMOTHERAPY MANAGEMENT: ICD-10-CM

## 2020-06-02 LAB
ABO + RH BLD: NORMAL
ALBUMIN SERPL BCP-MCNC: 4 G/DL (ref 3.5–5.2)
ALP SERPL-CCNC: 67 U/L (ref 55–135)
ALT SERPL W/O P-5'-P-CCNC: 19 U/L (ref 10–44)
ANION GAP SERPL CALC-SCNC: 9 MMOL/L (ref 8–16)
AST SERPL-CCNC: 26 U/L (ref 10–40)
BASOPHILS # BLD AUTO: 0.03 K/UL (ref 0–0.2)
BASOPHILS NFR BLD: 0.9 % (ref 0–1.9)
BILIRUB SERPL-MCNC: 0.4 MG/DL (ref 0.1–1)
BLD GP AB SCN CELLS X3 SERPL QL: NORMAL
BUN SERPL-MCNC: 12 MG/DL (ref 8–23)
CALCIUM SERPL-MCNC: 9.1 MG/DL (ref 8.7–10.5)
CHLORIDE SERPL-SCNC: 107 MMOL/L (ref 95–110)
CO2 SERPL-SCNC: 26 MMOL/L (ref 23–29)
CREAT SERPL-MCNC: 1 MG/DL (ref 0.5–1.4)
DIFFERENTIAL METHOD: ABNORMAL
EOSINOPHIL # BLD AUTO: 0.1 K/UL (ref 0–0.5)
EOSINOPHIL NFR BLD: 3 % (ref 0–8)
ERYTHROCYTE [DISTWIDTH] IN BLOOD BY AUTOMATED COUNT: 17.9 % (ref 11.5–14.5)
EST. GFR  (AFRICAN AMERICAN): >60 ML/MIN/1.73 M^2
EST. GFR  (NON AFRICAN AMERICAN): 56.8 ML/MIN/1.73 M^2
GLUCOSE SERPL-MCNC: 107 MG/DL (ref 70–110)
HCT VFR BLD AUTO: 36.8 % (ref 37–48.5)
HGB BLD-MCNC: 10.9 G/DL (ref 12–16)
IMM GRANULOCYTES # BLD AUTO: 0.01 K/UL (ref 0–0.04)
IMM GRANULOCYTES NFR BLD AUTO: 0.3 % (ref 0–0.5)
LYMPHOCYTES # BLD AUTO: 0.4 K/UL (ref 1–4.8)
LYMPHOCYTES NFR BLD: 13 % (ref 18–48)
MCH RBC QN AUTO: 26 PG (ref 27–31)
MCHC RBC AUTO-ENTMCNC: 29.6 G/DL (ref 32–36)
MCV RBC AUTO: 88 FL (ref 82–98)
MONOCYTES # BLD AUTO: 0.4 K/UL (ref 0.3–1)
MONOCYTES NFR BLD: 13.3 % (ref 4–15)
NEUTROPHILS # BLD AUTO: 2.3 K/UL (ref 1.8–7.7)
NEUTROPHILS NFR BLD: 69.5 % (ref 38–73)
NRBC BLD-RTO: 0 /100 WBC
PLATELET # BLD AUTO: 185 K/UL (ref 150–350)
PMV BLD AUTO: 11.2 FL (ref 9.2–12.9)
POTASSIUM SERPL-SCNC: 3.9 MMOL/L (ref 3.5–5.1)
PROT SERPL-MCNC: 7.5 G/DL (ref 6–8.4)
RBC # BLD AUTO: 4.19 M/UL (ref 4–5.4)
SARS-COV-2 RNA RESP QL NAA+PROBE: NOT DETECTED
SODIUM SERPL-SCNC: 142 MMOL/L (ref 136–145)
WBC # BLD AUTO: 3.3 K/UL (ref 3.9–12.7)

## 2020-06-02 PROCEDURE — 36415 COLL VENOUS BLD VENIPUNCTURE: CPT | Mod: HCNC

## 2020-06-02 PROCEDURE — 85025 COMPLETE CBC W/AUTO DIFF WBC: CPT | Mod: HCNC

## 2020-06-02 PROCEDURE — 80053 COMPREHEN METABOLIC PANEL: CPT | Mod: HCNC

## 2020-06-02 PROCEDURE — U0003 INFECTIOUS AGENT DETECTION BY NUCLEIC ACID (DNA OR RNA); SEVERE ACUTE RESPIRATORY SYNDROME CORONAVIRUS 2 (SARS-COV-2) (CORONAVIRUS DISEASE [COVID-19]), AMPLIFIED PROBE TECHNIQUE, MAKING USE OF HIGH THROUGHPUT TECHNOLOGIES AS DESCRIBED BY CMS-2020-01-R: HCPCS | Mod: HCNC

## 2020-06-02 PROCEDURE — 86901 BLOOD TYPING SEROLOGIC RH(D): CPT | Mod: HCNC

## 2020-06-03 NOTE — PROGRESS NOTES
Dr. Tineo staff-  The patient's COVID test came back negative, and she can therefore proceed with her appointments as scheduled unless otherwise contraindicated.  Please make sure the patient is aware.  -Kenneth

## 2020-06-04 ENCOUNTER — INFUSION (OUTPATIENT)
Dept: INFUSION THERAPY | Facility: HOSPITAL | Age: 70
End: 2020-06-04
Attending: NURSE PRACTITIONER
Payer: MEDICARE

## 2020-06-04 ENCOUNTER — OFFICE VISIT (OUTPATIENT)
Dept: INTERNAL MEDICINE | Facility: CLINIC | Age: 70
End: 2020-06-04
Payer: MEDICARE

## 2020-06-04 VITALS
HEART RATE: 81 BPM | WEIGHT: 164.88 LBS | DIASTOLIC BLOOD PRESSURE: 82 MMHG | HEIGHT: 63 IN | SYSTOLIC BLOOD PRESSURE: 128 MMHG | BODY MASS INDEX: 29.21 KG/M2 | OXYGEN SATURATION: 97 %

## 2020-06-04 VITALS
SYSTOLIC BLOOD PRESSURE: 161 MMHG | WEIGHT: 164.88 LBS | HEIGHT: 63 IN | RESPIRATION RATE: 18 BRPM | DIASTOLIC BLOOD PRESSURE: 75 MMHG | TEMPERATURE: 98 F | HEART RATE: 75 BPM | BODY MASS INDEX: 29.21 KG/M2

## 2020-06-04 DIAGNOSIS — Z94.81 STATUS POST AUTOLOGOUS BONE MARROW TRANSPLANT: ICD-10-CM

## 2020-06-04 DIAGNOSIS — R25.1 TREMOR: ICD-10-CM

## 2020-06-04 DIAGNOSIS — I10 ESSENTIAL HYPERTENSION: ICD-10-CM

## 2020-06-04 DIAGNOSIS — G62.0 DRUG-INDUCED POLYNEUROPATHY: ICD-10-CM

## 2020-06-04 DIAGNOSIS — Z78.9 STATIN INTOLERANCE: ICD-10-CM

## 2020-06-04 DIAGNOSIS — D63.0 ANEMIA IN NEOPLASTIC DISEASE: ICD-10-CM

## 2020-06-04 DIAGNOSIS — R16.0 LIVER MASS: ICD-10-CM

## 2020-06-04 DIAGNOSIS — C90.00 MULTIPLE MYELOMA NOT HAVING ACHIEVED REMISSION: ICD-10-CM

## 2020-06-04 DIAGNOSIS — Z85.3 PERSONAL HISTORY OF MALIGNANT NEOPLASM OF BREAST: ICD-10-CM

## 2020-06-04 DIAGNOSIS — C90.01 MULTIPLE MYELOMA IN REMISSION: Primary | ICD-10-CM

## 2020-06-04 DIAGNOSIS — E11.9 TYPE 2 DIABETES MELLITUS WITHOUT COMPLICATION, WITHOUT LONG-TERM CURRENT USE OF INSULIN: Primary | ICD-10-CM

## 2020-06-04 DIAGNOSIS — I69.351 HEMIPLEGIA AND HEMIPARESIS FOLLOWING CEREBRAL INFARCTION AFFECTING RIGHT DOMINANT SIDE: ICD-10-CM

## 2020-06-04 DIAGNOSIS — J84.9 INTERSTITIAL LUNG DISEASE: ICD-10-CM

## 2020-06-04 DIAGNOSIS — C90.00 METASTATIC MULTIPLE MYELOMA TO BONE: ICD-10-CM

## 2020-06-04 PROBLEM — I50.33 ACUTE ON CHRONIC DIASTOLIC HEART FAILURE: Status: RESOLVED | Noted: 2019-04-30 | Resolved: 2020-06-04

## 2020-06-04 PROCEDURE — 96401 CHEMO ANTI-NEOPL SQ/IM: CPT | Mod: HCNC

## 2020-06-04 PROCEDURE — 99214 OFFICE O/P EST MOD 30 MIN: CPT | Mod: HCNC,BMT,S$GLB, | Performed by: INTERNAL MEDICINE

## 2020-06-04 PROCEDURE — 99999 PR PBB SHADOW E&M-EST. PATIENT-LVL III: CPT | Mod: PBBFAC,HCNC,BMT, | Performed by: INTERNAL MEDICINE

## 2020-06-04 PROCEDURE — 1125F AMNT PAIN NOTED PAIN PRSNT: CPT | Mod: HCNC,BMT,S$GLB, | Performed by: INTERNAL MEDICINE

## 2020-06-04 PROCEDURE — 99214 PR OFFICE/OUTPT VISIT, EST, LEVL IV, 30-39 MIN: ICD-10-PCS | Mod: HCNC,BMT,S$GLB, | Performed by: INTERNAL MEDICINE

## 2020-06-04 PROCEDURE — 3079F PR MOST RECENT DIASTOLIC BLOOD PRESSURE 80-89 MM HG: ICD-10-PCS | Mod: HCNC,BMT,CPTII,S$GLB | Performed by: INTERNAL MEDICINE

## 2020-06-04 PROCEDURE — 3074F SYST BP LT 130 MM HG: CPT | Mod: HCNC,BMT,CPTII,S$GLB | Performed by: INTERNAL MEDICINE

## 2020-06-04 PROCEDURE — 1159F MED LIST DOCD IN RCRD: CPT | Mod: HCNC,BMT,S$GLB, | Performed by: INTERNAL MEDICINE

## 2020-06-04 PROCEDURE — 3074F PR MOST RECENT SYSTOLIC BLOOD PRESSURE < 130 MM HG: ICD-10-PCS | Mod: HCNC,BMT,CPTII,S$GLB | Performed by: INTERNAL MEDICINE

## 2020-06-04 PROCEDURE — 1125F PR PAIN SEVERITY QUANTIFIED, PAIN PRESENT: ICD-10-PCS | Mod: HCNC,BMT,S$GLB, | Performed by: INTERNAL MEDICINE

## 2020-06-04 PROCEDURE — 3079F DIAST BP 80-89 MM HG: CPT | Mod: HCNC,BMT,CPTII,S$GLB | Performed by: INTERNAL MEDICINE

## 2020-06-04 PROCEDURE — 1101F PT FALLS ASSESS-DOCD LE1/YR: CPT | Mod: HCNC,BMT,CPTII,S$GLB | Performed by: INTERNAL MEDICINE

## 2020-06-04 PROCEDURE — 99999 PR PBB SHADOW E&M-EST. PATIENT-LVL III: ICD-10-PCS | Mod: PBBFAC,HCNC,BMT, | Performed by: INTERNAL MEDICINE

## 2020-06-04 PROCEDURE — 1101F PR PT FALLS ASSESS DOC 0-1 FALLS W/OUT INJ PAST YR: ICD-10-PCS | Mod: HCNC,BMT,CPTII,S$GLB | Performed by: INTERNAL MEDICINE

## 2020-06-04 PROCEDURE — 1159F PR MEDICATION LIST DOCUMENTED IN MEDICAL RECORD: ICD-10-PCS | Mod: HCNC,BMT,S$GLB, | Performed by: INTERNAL MEDICINE

## 2020-06-04 PROCEDURE — 63600175 PHARM REV CODE 636 W HCPCS: Mod: JG,HCNC | Performed by: INTERNAL MEDICINE

## 2020-06-04 RX ORDER — BORTEZOMIB 3.5 MG/1
1.3 INJECTION, POWDER, LYOPHILIZED, FOR SOLUTION INTRAVENOUS; SUBCUTANEOUS
Status: COMPLETED | OUTPATIENT
Start: 2020-06-04 | End: 2020-06-04

## 2020-06-04 RX ADMIN — BORTEZOMIB 2.3 MG: 3.5 INJECTION, POWDER, LYOPHILIZED, FOR SOLUTION INTRAVENOUS; SUBCUTANEOUS at 11:06

## 2020-06-04 NOTE — PROGRESS NOTES
She is a 70-year-old female coming in today to follow-up her ongoing medical problems.  A lot has  Gone on  since I last saw her back in May of 2019.  She has multiple myeloma  metastatic.  It is RISS stage III.  She is currently getting therapy with  Velcade and following with oncology.  He last saw him last month and is scheduled for labs and follow-up in about 3 weeks.  She had a bone marrow transplant back in February 2010.  She did have remission of her multiple myeloma but then she had a recurrence.  So now she is back on chemo.  She also has neuropathy secondary to the chemo.  Affects her hands in a glove-like distribution.  She also has pancytopenia.  She has a history of a stroke that affected her right arm and she has a tremor or right.  He has also has hepatic lesions that being followed by oncology.  She has diabetes mellitus type 2 and history of peripheral vascular disease.  He is not on any medication for her diabetes and her last hemoglobin A1c was over a year ago.  Most recent glucoses checked by how oncology has look normal.  She is not on a statin despite having peripheral vascular disease and diabetes due to the fact that she is intolerant of the stat and also she is on a PC SK 9 inhibitor.  She also stent have interstitial lung disease on her workup for her bone marrow transplant.  Her 6 min walk was unremarkable but now she does say she does have some shortness of breath at times mainly with walking.  She admits that she has not been as active as she normally is.  Her main out exam been going out to see doctors if she is not getting any exercise at home.  She has hypertension for which is on amlodipine 5 mg a day.  Blood pressure today is 128/82.  She is a having trouble with her hypertension.  She also has history of depression for which we have her little Zoloft and she says has been doing pretty well despite her illnesses.  Her  also had a coronary artery disease and had a heart  "attack when she was a 1st visit to the hospital to start chemotherapy.    Her review of system includes occasional shortness of breath.  She does have some fatigue.  She is eating okay.  She is sleeping okay.  Her bowels have been little bit irregular but that seems to be resolving.  Her mood has been good and she has been praying a lot and not listening to the news about the pandemic and everything else going on right now.  She has good social support at her house with her family.    /82 (BP Location: Right arm, Patient Position: Sitting, BP Method: Medium (Manual))   Pulse 81   Ht 5' 3" (1.6 m)   Wt 74.8 kg (164 lb 14.5 oz)   LMP  (LMP Unknown)   SpO2 97%   BMI 29.21 kg/m²   She comes in without using any kind of assistance for walking.  She is by herself.  Her 's in the car today.  Her mood is good.  He hearing is grossly normal.  Her neck is supple without JVD.  There is no cervical adenopathy.  Her chest is clear without wheeze.  Lungs the have any crackles or dullness.  Cardiovascular exam is unremarkable.  Abdomen is soft and nontender.  She does have a tremor more her right hand that her left hand and she has decreased sensation in both hands.  Her hands her right hand is weaker than her left hand but she still able to move it against gravity and against resistance.  Her lower extremities are slightly swollen but is nonpitting.  Her ankles are clearly visible and her foot exam shows slight decrease in sensitivity.  She has no foot lesions available.  Her dorsal pedis pulse is +2 in strong.  She walks a little bit hunched over but without any difficulty.      Assessment and plan:Type 2 diabetes mellitus without complication, without long-term current use of insulin  -     Lipid Panel; Future; Expected date: 06/04/2020  -     Hemoglobin A1C; Future; Expected date: 06/04/2020    Tremor    Statin intolerance    Status post autologous bone marrow transplant    Personal history of malignant " neoplasm of breast    Multiple myeloma not having achieved remission    Liver mass    Metastatic multiple myeloma to bone    Interstitial lung disease    Essential hypertension    Anemia in neoplastic disease    Drug-induced polyneuropathy     Unknown at hemoglobin A1c and a lipid panel on her labs that she is going again on 06/19.  She is due for eye exam but will hold off on that until she gets better control over multiple myeloma.  Continue follow-up with oncology.  I will see her back in 6 months.  She is going let me know if she has any trouble.  I will refill her tramadol that she has for muscle skeleton pain and she will try to use this sparingly.  Lastly will follow-up with ophthalmology at a later time.  Also discussed the COVID pandemic and her immuno  compromise status and discussed good hygiene and avoidance of sick people.

## 2020-06-19 ENCOUNTER — LAB VISIT (OUTPATIENT)
Dept: LAB | Facility: HOSPITAL | Age: 70
End: 2020-06-19
Attending: INTERNAL MEDICINE
Payer: MEDICARE

## 2020-06-19 DIAGNOSIS — E11.9 TYPE 2 DIABETES MELLITUS WITHOUT COMPLICATION, WITHOUT LONG-TERM CURRENT USE OF INSULIN: ICD-10-CM

## 2020-06-19 DIAGNOSIS — C90.01 MULTIPLE MYELOMA IN REMISSION: ICD-10-CM

## 2020-06-19 DIAGNOSIS — Z94.81 STATUS POST AUTOLOGOUS BONE MARROW TRANSPLANT: ICD-10-CM

## 2020-06-19 DIAGNOSIS — C90.00 MULTIPLE MYELOMA NOT HAVING ACHIEVED REMISSION: ICD-10-CM

## 2020-06-19 LAB
ABO + RH BLD: NORMAL
ALBUMIN SERPL BCP-MCNC: 4.1 G/DL (ref 3.5–5.2)
ALP SERPL-CCNC: 79 U/L (ref 55–135)
ALT SERPL W/O P-5'-P-CCNC: 19 U/L (ref 10–44)
ANION GAP SERPL CALC-SCNC: 10 MMOL/L (ref 8–16)
AST SERPL-CCNC: 24 U/L (ref 10–40)
BASOPHILS # BLD AUTO: 0.03 K/UL (ref 0–0.2)
BASOPHILS NFR BLD: 0.7 % (ref 0–1.9)
BILIRUB SERPL-MCNC: 0.3 MG/DL (ref 0.1–1)
BLD GP AB SCN CELLS X3 SERPL QL: NORMAL
BUN SERPL-MCNC: 10 MG/DL (ref 8–23)
CALCIUM SERPL-MCNC: 9.4 MG/DL (ref 8.7–10.5)
CHLORIDE SERPL-SCNC: 110 MMOL/L (ref 95–110)
CHOLEST SERPL-MCNC: 206 MG/DL (ref 120–199)
CHOLEST/HDLC SERPL: 3.7 {RATIO} (ref 2–5)
CO2 SERPL-SCNC: 26 MMOL/L (ref 23–29)
CREAT SERPL-MCNC: 1 MG/DL (ref 0.5–1.4)
DIFFERENTIAL METHOD: ABNORMAL
EOSINOPHIL # BLD AUTO: 0.1 K/UL (ref 0–0.5)
EOSINOPHIL NFR BLD: 2.9 % (ref 0–8)
ERYTHROCYTE [DISTWIDTH] IN BLOOD BY AUTOMATED COUNT: 17.4 % (ref 11.5–14.5)
EST. GFR  (AFRICAN AMERICAN): >60 ML/MIN/1.73 M^2
EST. GFR  (NON AFRICAN AMERICAN): 56.8 ML/MIN/1.73 M^2
ESTIMATED AVG GLUCOSE: 126 MG/DL (ref 68–131)
GLUCOSE SERPL-MCNC: 96 MG/DL (ref 70–110)
HBA1C MFR BLD HPLC: 6 % (ref 4–5.6)
HCT VFR BLD AUTO: 37.2 % (ref 37–48.5)
HDLC SERPL-MCNC: 55 MG/DL (ref 40–75)
HDLC SERPL: 26.7 % (ref 20–50)
HGB BLD-MCNC: 11.3 G/DL (ref 12–16)
IGA SERPL-MCNC: 78 MG/DL (ref 40–350)
IGG SERPL-MCNC: 1296 MG/DL (ref 650–1600)
IGM SERPL-MCNC: 29 MG/DL (ref 50–300)
IMM GRANULOCYTES # BLD AUTO: 0.02 K/UL (ref 0–0.04)
IMM GRANULOCYTES NFR BLD AUTO: 0.5 % (ref 0–0.5)
LDLC SERPL CALC-MCNC: 111.8 MG/DL (ref 63–159)
LYMPHOCYTES # BLD AUTO: 0.5 K/UL (ref 1–4.8)
LYMPHOCYTES NFR BLD: 13 % (ref 18–48)
MCH RBC QN AUTO: 26.5 PG (ref 27–31)
MCHC RBC AUTO-ENTMCNC: 30.4 G/DL (ref 32–36)
MCV RBC AUTO: 87 FL (ref 82–98)
MONOCYTES # BLD AUTO: 0.5 K/UL (ref 0.3–1)
MONOCYTES NFR BLD: 13 % (ref 4–15)
NEUTROPHILS # BLD AUTO: 2.8 K/UL (ref 1.8–7.7)
NEUTROPHILS NFR BLD: 69.9 % (ref 38–73)
NONHDLC SERPL-MCNC: 151 MG/DL
NRBC BLD-RTO: 0 /100 WBC
PLATELET # BLD AUTO: 175 K/UL (ref 150–350)
PMV BLD AUTO: 11.9 FL (ref 9.2–12.9)
POTASSIUM SERPL-SCNC: 3.6 MMOL/L (ref 3.5–5.1)
PROT SERPL-MCNC: 7.8 G/DL (ref 6–8.4)
RBC # BLD AUTO: 4.26 M/UL (ref 4–5.4)
SODIUM SERPL-SCNC: 146 MMOL/L (ref 136–145)
TRIGL SERPL-MCNC: 196 MG/DL (ref 30–150)
WBC # BLD AUTO: 4.07 K/UL (ref 3.9–12.7)

## 2020-06-19 PROCEDURE — 80053 COMPREHEN METABOLIC PANEL: CPT | Mod: HCNC

## 2020-06-19 PROCEDURE — 86334 IMMUNOFIX E-PHORESIS SERUM: CPT | Mod: HCNC

## 2020-06-19 PROCEDURE — 86334 IMMUNOFIX E-PHORESIS SERUM: CPT | Mod: 26,HCNC,BMT, | Performed by: PATHOLOGY

## 2020-06-19 PROCEDURE — 83036 HEMOGLOBIN GLYCOSYLATED A1C: CPT | Mod: HCNC

## 2020-06-19 PROCEDURE — 80061 LIPID PANEL: CPT | Mod: HCNC

## 2020-06-19 PROCEDURE — 86850 RBC ANTIBODY SCREEN: CPT | Mod: HCNC

## 2020-06-19 PROCEDURE — 84165 PROTEIN E-PHORESIS SERUM: CPT | Mod: HCNC

## 2020-06-19 PROCEDURE — 85025 COMPLETE CBC W/AUTO DIFF WBC: CPT | Mod: HCNC

## 2020-06-19 PROCEDURE — 82784 ASSAY IGA/IGD/IGG/IGM EACH: CPT | Mod: 59,HCNC

## 2020-06-19 PROCEDURE — 84165 PATHOLOGIST INTERPRETATION SPE: ICD-10-PCS | Mod: 26,HCNC,BMT, | Performed by: PATHOLOGY

## 2020-06-19 PROCEDURE — 86334 PATHOLOGIST INTERPRETATION IFE: ICD-10-PCS | Mod: 26,HCNC,BMT, | Performed by: PATHOLOGY

## 2020-06-19 PROCEDURE — 83520 IMMUNOASSAY QUANT NOS NONAB: CPT | Mod: HCNC

## 2020-06-19 PROCEDURE — 84165 PROTEIN E-PHORESIS SERUM: CPT | Mod: 26,HCNC,BMT, | Performed by: PATHOLOGY

## 2020-06-22 ENCOUNTER — INFUSION (OUTPATIENT)
Dept: INFUSION THERAPY | Facility: HOSPITAL | Age: 70
End: 2020-06-22
Attending: INTERNAL MEDICINE
Payer: MEDICARE

## 2020-06-22 ENCOUNTER — OFFICE VISIT (OUTPATIENT)
Dept: HEMATOLOGY/ONCOLOGY | Facility: CLINIC | Age: 70
End: 2020-06-22
Payer: MEDICARE

## 2020-06-22 VITALS
BODY MASS INDEX: 29.21 KG/M2 | RESPIRATION RATE: 18 BRPM | SYSTOLIC BLOOD PRESSURE: 134 MMHG | TEMPERATURE: 98 F | DIASTOLIC BLOOD PRESSURE: 74 MMHG | HEIGHT: 63 IN | HEART RATE: 81 BPM | WEIGHT: 164.88 LBS

## 2020-06-22 DIAGNOSIS — D50.9 IRON DEFICIENCY ANEMIA, UNSPECIFIED IRON DEFICIENCY ANEMIA TYPE: Primary | ICD-10-CM

## 2020-06-22 DIAGNOSIS — C90.01 MULTIPLE MYELOMA IN REMISSION: Primary | ICD-10-CM

## 2020-06-22 DIAGNOSIS — C90.00 MULTIPLE MYELOMA, REMISSION STATUS UNSPECIFIED: ICD-10-CM

## 2020-06-22 DIAGNOSIS — C90.00 MULTIPLE MYELOMA NOT HAVING ACHIEVED REMISSION: ICD-10-CM

## 2020-06-22 DIAGNOSIS — Z94.84 HISTORY OF AUTO STEM CELL TRANSPLANT: ICD-10-CM

## 2020-06-22 LAB
ALBUMIN SERPL ELPH-MCNC: 4.34 G/DL (ref 3.35–5.55)
ALPHA1 GLOB SERPL ELPH-MCNC: 0.29 G/DL (ref 0.17–0.41)
ALPHA2 GLOB SERPL ELPH-MCNC: 0.95 G/DL (ref 0.43–0.99)
B-GLOBULIN SERPL ELPH-MCNC: 0.74 G/DL (ref 0.5–1.1)
GAMMA GLOB SERPL ELPH-MCNC: 1.19 G/DL (ref 0.67–1.58)
INTERPRETATION SERPL IFE-IMP: NORMAL
KAPPA LC SER QL IA: 2.2 MG/DL (ref 0.33–1.94)
KAPPA LC/LAMBDA SER IA: 2.24 (ref 0.26–1.65)
LAMBDA LC SER QL IA: 0.98 MG/DL (ref 0.57–2.63)
PATHOLOGIST INTERPRETATION IFE: NORMAL
PATHOLOGIST INTERPRETATION SPE: NORMAL
PROT SERPL-MCNC: 7.5 G/DL (ref 6–8.4)

## 2020-06-22 PROCEDURE — 1101F PT FALLS ASSESS-DOCD LE1/YR: CPT | Mod: HCNC,BMT,CPTII,95 | Performed by: INTERNAL MEDICINE

## 2020-06-22 PROCEDURE — 1101F PR PT FALLS ASSESS DOC 0-1 FALLS W/OUT INJ PAST YR: ICD-10-PCS | Mod: HCNC,BMT,CPTII,95 | Performed by: INTERNAL MEDICINE

## 2020-06-22 PROCEDURE — 3078F DIAST BP <80 MM HG: CPT | Mod: HCNC,BMT,CPTII,95 | Performed by: INTERNAL MEDICINE

## 2020-06-22 PROCEDURE — 96401 CHEMO ANTI-NEOPL SQ/IM: CPT | Mod: HCNC

## 2020-06-22 PROCEDURE — 99443 PR PHYSICIAN TELEPHONE EVALUATION 21-30 MIN: CPT | Mod: HCNC,BMT,95, | Performed by: INTERNAL MEDICINE

## 2020-06-22 PROCEDURE — 1159F MED LIST DOCD IN RCRD: CPT | Mod: HCNC,BMT,95, | Performed by: INTERNAL MEDICINE

## 2020-06-22 PROCEDURE — 3078F PR MOST RECENT DIASTOLIC BLOOD PRESSURE < 80 MM HG: ICD-10-PCS | Mod: HCNC,BMT,CPTII,95 | Performed by: INTERNAL MEDICINE

## 2020-06-22 PROCEDURE — 1159F PR MEDICATION LIST DOCUMENTED IN MEDICAL RECORD: ICD-10-PCS | Mod: HCNC,BMT,95, | Performed by: INTERNAL MEDICINE

## 2020-06-22 PROCEDURE — 3074F PR MOST RECENT SYSTOLIC BLOOD PRESSURE < 130 MM HG: ICD-10-PCS | Mod: HCNC,BMT,CPTII,95 | Performed by: INTERNAL MEDICINE

## 2020-06-22 PROCEDURE — 3074F SYST BP LT 130 MM HG: CPT | Mod: HCNC,BMT,CPTII,95 | Performed by: INTERNAL MEDICINE

## 2020-06-22 PROCEDURE — 63600175 PHARM REV CODE 636 W HCPCS: Mod: JG,HCNC | Performed by: INTERNAL MEDICINE

## 2020-06-22 PROCEDURE — 99443 PR PHYSICIAN TELEPHONE EVALUATION 21-30 MIN: ICD-10-PCS | Mod: HCNC,BMT,95, | Performed by: INTERNAL MEDICINE

## 2020-06-22 RX ORDER — BORTEZOMIB 3.5 MG/1
1.3 INJECTION, POWDER, LYOPHILIZED, FOR SOLUTION INTRAVENOUS; SUBCUTANEOUS
Status: CANCELLED | OUTPATIENT
Start: 2020-06-22

## 2020-06-22 RX ORDER — SODIUM CHLORIDE 0.9 % (FLUSH) 0.9 %
10 SYRINGE (ML) INJECTION
Status: CANCELLED | OUTPATIENT
Start: 2020-06-22

## 2020-06-22 RX ORDER — BORTEZOMIB 3.5 MG/1
1.3 INJECTION, POWDER, LYOPHILIZED, FOR SOLUTION INTRAVENOUS; SUBCUTANEOUS
Status: COMPLETED | OUTPATIENT
Start: 2020-06-22 | End: 2020-06-22

## 2020-06-22 RX ORDER — HEPARIN 100 UNIT/ML
500 SYRINGE INTRAVENOUS
Status: CANCELLED | OUTPATIENT
Start: 2020-06-22

## 2020-06-22 RX ADMIN — BORTEZOMIB 2.3 MG: 3.5 INJECTION, POWDER, LYOPHILIZED, FOR SOLUTION INTRAVENOUS; SUBCUTANEOUS at 11:06

## 2020-06-22 NOTE — Clinical Note
-velcade injection on 7/6, 7/20, 8/3  -cbc, cmp, serum free light chains, quantitative immunoglobulins, serum electropheresis, serum immunofixation and in person NP or MD appt on 8/3  -please schedule day +180 post autoSCT vaccines on 8/3 after velcade

## 2020-06-22 NOTE — NURSING
Tolerated Velcade injection to abdomen with no complications. VS stable. Labs reviewed. Consent in chart. Patient has no complaints at this time. Appointments reviewed. Discharged home.

## 2020-06-23 NOTE — PROGRESS NOTES
Established Patient - Audio Only Telehealth Visit     The patient location is: home  The chief complaint leading to consultation is: MM s/p auto SCT  Visit type: Virtual visit with audio only (telephone)  Total time spent with patient: 10 minutes  Total time spent preparing for visit and with patient: 30 minutes       The reason for the audio only service rather than synchronous audio and video virtual visit was related to technical difficulties or patient preference/necessity.     Each patient to whom I provide medical services by telemedicine is:  (1) informed of the relationship between the physician and patient and the respective role of any other health care provider with respect to management of the patient; and (2) notified that they may decline to receive medical services by telemedicine and may withdraw from such care at any time. Patient verbally consented to receive this service via voice-only telephone call.      Notes:     PATIENT: Shelby Thompson  MRN: [id number]  DATE: 3/4/2020    Chief Complaint: No chief complaint on file.      Oncologic History:   70 y.o. female    Multiple Myeloma- presented w CRAB criteria (Hgb 7.1, hypercalcemia, renal function - Cr of 2.7, T7 vertebral fracture)  IgG Kappa, RISS Stage III (beta-2 micro globulin of 17.5 and 14:20 translocation) High Risk disease  She   achieved and tolerated well VRd x completing 7, 28 day cycles and achieving deep VGPR to induction.      Extensive PMH as below.    2 prior CVAs (one in 2008, one in 2011)  L breast cancer DCIS stage 0 (s/p lumpectomy and radiation, no endocrine tx due to CVA)  HTN  DM II  Neuropathy, b/l hands  Prior smoker, quit in 2011  Hepatic lesions - vascular per recent MRI in 09 /2019 with no changes; will confirm no further rascon needed from hep  Statin Intolerance - on praluent, PCSK9 inhibitor   HFpEF - EF 55%, diastolic dysfunction  Anxiety/Depression    ADMISSION SUMMARY: 2/10-2/26/2020  Admitted 2/10, tolerated chemo and  transplant well. Engrafted on day +12. Remained afebrile throughout hospital stay and no mucositis. Experienced expected side effects of nausea and diarrhea controlled with antiemetics and Imodium. Discharged home with fernando PT/OT     Today: Ms. Thompson is a 70 y.o. female patient of Dr. Bro who presents virtually (audio only) today for her next dose of q 2 week bortezimib maintenance. Maintenance  was initiated 5/6/20.   She is Day +132  from a Verenice Auto SCT. She had her Day +100 BMBx on 5/6/20, which showed no residual disease. She is feeling well overall today. She denies fevers, chills, chest pain, cough, other s/s of COVID, and n/v/d/c. She continues with chronic  Grade 1 neuropathy that is unchanged and and preceded myeloma dx, fatigue, and SOB on exertion.  Able to Do ADLS.        Past Medical History:   Past Medical History:   Diagnosis Date    Acute respiratory failure with hypoxia 4/30/2019    FUENTES (acute kidney injury) 5/1/2019    Allergy     Anemia     Aortic aneurysm     Breast cancer 10/2011    left breast Stage 0 DCIS    Chronic diastolic congestive heart failure 11/6/2015    Colon polyp     GERD (gastroesophageal reflux disease)     Hiatal hernia     History of colonic polyps     Hx of psychiatric care     Zoloft    HX: breast cancer     Hyperlipemia     Hypertension     ICH (intracerebral hemorrhage)     Major depressive disorder, single episode, mild 6/23/2016    Nuclear sclerosis 7/21/2014    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 7/21/2014    PAD (peripheral artery disease) 11/6/2015    Psychiatric problem     Statin-induced rhabdomyolysis     4/2015     Stroke 4/2011    Type 2 diabetes mellitus without complication, without long-term current use of insulin 2/10/2020    Walker as ambulation aid        Past Surgical HIstory:   Past Surgical History:   Procedure Laterality Date    APPENDECTOMY      BONE MARROW BIOPSY Left 10/3/2019     Procedure: Biopsy-bone marrow;  Surgeon: Jere Tineo MD;  [facility];  Service: Oncology;  Laterality: Left;    BREAST BIOPSY Left 10/2011    BREAST LUMPECTOMY Left 2011    DCIS    COLONOSCOPY      COLONOSCOPY N/A 1/9/2020    Procedure: COLONOSCOPY;  Surgeon: Quang De La Cruz MD;  Location: [facility] ENDO (4TH FLR);  Service: Endoscopy;  Laterality: N/A;    CYST REMOVAL      back    ESOPHAGOGASTRODUODENOSCOPY  07/2016    duodenal angioectasia    ESOPHAGOGASTRODUODENOSCOPY N/A 1/9/2020    Procedure: EGD (ESOPHAGOGASTRODUODENOSCOPY);  Surgeon: Quang De La Cruz MD;  Location: [facility] ENDO (4TH FLR);  Service: Endoscopy;  Laterality: N/A;    FOOT FRACTURE SURGERY  10/2012    right foot, with R hallux valgus repair    FRACTURE SURGERY Right     broken toe repiar    HEMORRHOID SURGERY      LIVER BIOPSY  04/2015    essentially normal, no fibrosis    TUBAL LIGATION      UPPER GASTROINTESTINAL ENDOSCOPY         Family History:   Family History   Problem Relation Age of Onset    No Known Problems Sister     Cancer Sister 63        Lung Cancer    No Known Problems Mother     Cancer Father     No Known Problems Brother     Hypertension Daughter     Fibroids Daughter         uterine    Hypertension Son     Breast cancer Sister 62    No Known Problems Brother     No Known Problems Maternal Aunt     No Known Problems Maternal Uncle     No Known Problems Paternal Aunt     No Known Problems Paternal Uncle     Hypertension Maternal Grandmother     No Known Problems Maternal Grandfather     No Known Problems Paternal Grandmother     No Known Problems Paternal Grandfather     Ovarian cancer Neg Hx     Colon cancer Neg Hx     Tremor Neg Hx     Amblyopia Neg Hx     Blindness Neg Hx     Cataracts Neg Hx     Diabetes Neg Hx     Glaucoma Neg Hx     Macular degeneration Neg Hx     Retinal detachment Neg Hx     Strabismus Neg Hx     Stroke Neg Hx     Thyroid disease Neg Hx      Esophageal cancer Neg Hx     Rectal cancer Neg Hx     Stomach cancer Neg Hx     Ulcerative colitis Neg Hx     Crohn's disease Neg Hx     Irritable bowel syndrome Neg Hx     Celiac disease Neg Hx        Social History:  reports that she quit smoking about 9 years ago. Her smoking use included cigarettes. She has a 10.00 pack-year smoking history. She has never used smokeless tobacco. She reports previous alcohol use. She reports that she does not use drugs.    Allergies:  Review of patient's allergies indicates:   Allergen Reactions    Lipitor [atorvastatin]      Itching, elevated cpk, muscle aches, statin induced myositis       Medications:  Current Outpatient Medications   Medication Sig Dispense Refill    acyclovir (ZOVIRAX) 800 MG Tab Take 1 tablet (800 mg total) by mouth 2 (two) times daily. 60 tablet 11    alirocumab (PRALUENT PEN) 75 mg/mL PnIj Inject 1 mL (75 mg total) into the skin every 14 (fourteen) days. 6 Syringe 3    amLODIPine (NORVASC) 5 MG tablet Take 1 tablet (5 mg total) by mouth once daily. 90 tablet 3    calcium-vitamin D3 (OS- + D3) 500 mg(1,250mg) -200 unit per tablet Take 1 tablet by mouth 2 (two) times daily. 60 tablet 0    ferrous gluconate 324 mg (37.5 mg iron) Tab TAKE 1 TABLET BY MOUTH 2 (TWO) TIMES DAILY WITH MEALS. 180 tablet 0    gabapentin (NEURONTIN) 300 MG capsule Take 1 capsule (300mg) by mouth every morning and 2 capsules (600mg) every evening. 90 capsule 5    loratadine (CLARITIN) 10 mg tablet Take 10 mg by mouth nightly.      magnesium oxide (MAG-OX) 400 mg (241.3 mg magnesium) tablet Take 1 tablet (400 mg total) by mouth 2 (two) times daily. 60 tablet 2    omeprazole (PRILOSEC) 40 MG capsule Take 1 capsule (40 mg total) by mouth once daily. 90 capsule 3    ondansetron (ZOFRAN) 4 MG tablet Take 1 tablet (4 mg total) by mouth every 6 (six) hours as needed for Nausea. 30 tablet 1    potassium chloride SA (K-DUR,KLOR-CON) 20 MEQ tablet Take 1 tablet (20 mEq  total) by mouth once daily. 30 tablet 11    sertraline (ZOLOFT) 50 MG tablet Take 1 tablet (50 mg total) by mouth once daily. 90 tablet 3    traMADoL (ULTRAM) 50 mg tablet Take 1 tablet (50 mg total) by mouth 2 (two) times daily as needed for Pain. 60 tablet 3     No current facility-administered medications for this visit.        Review of Systems   Constitutional: Negative.  Negative for fatigue and unexpected weight change.   HENT: Negative.    Eyes: Negative.    Respiratory: Negative for cough. Positive for SOB on exertion.  Cardiovascular: Negative for chest pain and palpitations.   Gastrointestinal: Negative for abdominal pain, blood in stool, nausea and vomiting, diarrhea, and constipation.   Endocrine: Negative.    Genitourinary: Negative for difficulty urinating and dysuria.   Musculoskeletal: negative   Skin: Negative for rash.   Allergic/Immunologic: Positive for immunocompromised state.   Neurological:   numbness in hands. Negative for headaches.   Hematological: Negative for adenopathy.       ECOG Performance Status: 1   Objective:      Vitals:   There were no vitals filed for this visit.    Physical exam not performed due to visit type (virtual audio).    Laboratory Data:  Lab Results   Component Value Date    WBC 4.07 06/19/2020    HGB 11.3 (L) 06/19/2020    HCT 37.2 06/19/2020    MCV 87 06/19/2020     06/19/2020           Gran # (ANC)   Date Value Ref Range Status   06/19/2020 2.8 1.8 - 7.7 K/uL Final     Gran%   Date Value Ref Range Status   06/19/2020 69.9 38.0 - 73.0 % Final     CMP  Sodium   Date Value Ref Range Status   06/19/2020 146 (H) 136 - 145 mmol/L Final     Potassium   Date Value Ref Range Status   06/19/2020 3.6 3.5 - 5.1 mmol/L Final     Chloride   Date Value Ref Range Status   06/19/2020 110 95 - 110 mmol/L Final     CO2   Date Value Ref Range Status   06/19/2020 26 23 - 29 mmol/L Final     Glucose   Date Value Ref Range Status   06/19/2020 96 70 - 110 mg/dL Final     BUN,  Bld   Date Value Ref Range Status   06/19/2020 10 8 - 23 mg/dL Final     Creatinine   Date Value Ref Range Status   06/19/2020 1.0 0.5 - 1.4 mg/dL Final     Calcium   Date Value Ref Range Status   06/19/2020 9.4 8.7 - 10.5 mg/dL Final     Total Protein   Date Value Ref Range Status   06/19/2020 7.8 6.0 - 8.4 g/dL Final     Albumin   Date Value Ref Range Status   06/19/2020 4.1 3.5 - 5.2 g/dL Final     Total Bilirubin   Date Value Ref Range Status   06/19/2020 0.3 0.1 - 1.0 mg/dL Final     Comment:     For infants and newborns, interpretation of results should be based  on gestational age, weight and in agreement with clinical  observations.  Premature Infant recommended reference ranges:  Up to 24 hours.............<8.0 mg/dL  Up to 48 hours............<12.0 mg/dL  3-5 days..................<15.0 mg/dL  6-29 days.................<15.0 mg/dL       Alkaline Phosphatase   Date Value Ref Range Status   06/19/2020 79 55 - 135 U/L Final     AST   Date Value Ref Range Status   06/19/2020 24 10 - 40 U/L Final     ALT   Date Value Ref Range Status   06/19/2020 19 10 - 44 U/L Final     Anion Gap   Date Value Ref Range Status   06/19/2020 10 8 - 16 mmol/L Final     eGFR if    Date Value Ref Range Status   06/19/2020 >60.0 >60 mL/min/1.73 m^2 Final     eGFR if non    Date Value Ref Range Status   06/19/2020 56.8 (A) >60 mL/min/1.73 m^2 Final     Comment:     Calculation used to obtain the estimated glomerular filtration  rate (eGFR) is the CKD-EPI equation.        [place] Free Light Chains   Date Value Ref Range Status   06/19/2020 2.20 (H) 0.33 - 1.94 mg/dL Final   05/18/2020 3.68 (H) 0.33 - 1.94 mg/dL Final   04/24/2020 3.32 (H) 0.33 - 1.94 mg/dL Final     [place] Free Light Chains   Date Value Ref Range Status   06/19/2020 0.98 0.57 - 2.63 mg/dL Final   05/18/2020 1.32 0.57 - 2.63 mg/dL Final   04/24/2020 1.31 0.57 - 2.63 mg/dL Final     Kappa/Lambda FLC Ratio   Date Value Ref Range Status    06/19/2020 2.24 (H) 0.26 - 1.65 Final   05/18/2020 2.79 (H) 0.26 - 1.65 Final   04/24/2020 2.53 (H) 0.26 - 1.65 Final     IgG - Serum   Date Value Ref Range Status   06/19/2020 1296 650 - 1600 mg/dL Final     Comment:     IgG Cord Blood Reference Range: 650-1600 mg/dL.     IgA   Date Value Ref Range Status   06/19/2020 78 40 - 350 mg/dL Final     Comment:     IgA Cord Blood Reference Range: <5 mg/dL.     IgM   Date Value Ref Range Status   06/19/2020 29 (L) 50 - 300 mg/dL Final     Comment:     IgM Cord Blood Reference Range: <25 mg/dL.   Pathologist Interpretation SPE  Pathologist Interpretation SPE  Collected: 06/19/20 0959   Result status: Final   Resulting lab: [facility]   Value: REVIEWED   Comment: Electronically reviewed and signed by:   Shelli Haynes MD   Signed on 06/22/20 at 12:16   Normal total protein. Normal gamma globulins are decreased. There is   a paraprotein band in gamma = 0.66 g/dL, previously 0.85 g/dL.              Assessment:       1. Multiple myeloma, remission status unspecified    2. History of auto stem cell transplant           Plan:      Multiple Myeloma  -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) High Risk disease; complex karyotype by CG; , t(14;20), monosomy 13 on FISH  -She has achieved and tolerated well VRd x completing 7, 28 day cycles and achieving stringent  CR as of dec 2019 marrow/biochemical studies   -s/p Verenice 140  autologous stem cell transplant; Today is Day +131  - continue acyclovir while on velcade   -she presents virtually today for next dose of  of maintenance Velcade initiated on 5/6/20  -biochemical studies today show improved low level paraprotein; tolerating maintenance well;  Plan to continue until progression or intolerance     ID  acyc while on velcade  Initiate day +180 in approx 6 weeks        Pancytopenia  -hx of JESSE; received single dose feraheme pretransplant  -transfuse  for Hgb <7, Plt <10K  -monitor ferritin q 6 months; check next July 2020         Neuropathy  -pt has had numbness/tingling to hands and BUE tremors post CVA, for which she is on gabapentin 600 mg BID  -prn tramadol  -unchanged     Hx of CVA  -one in 2008, one in 2011  -cleared by cardiology for transplant    L breast cancer DCIS stage 0  -s/p lumpectomy and radiation, no endocrine tx due to CVA    HTN  -continue norvasc    DM II  -diet controlled     Hepatic lesions  -unchanged over serial MRI; reimage based on symptoms    HLD with Statin Intolerance  -on praluent, PCSK9 inhibitor    Anxiety/Depression  -continue zoloft    ILD  - had abnormal PFTs during transplant evaluation 10/2019; seen by pulm; no significant desaturation on 6MWT; cleared by pulm for transplant        FU  -velcade injection on 7/6, 7/20, 8/3  -cbc, cmp, serum free light chains, quantitative immunoglobulins, serum electropheresis, serum immunofixation and in person NP or MD appt on 8/3  -please schedule day +180 post autoSCT vaccines on 8/3 after velcade       This service was not originating from a related E/M service provided within the previous 7 days nor will  to an E/M service or procedure within the next 24 hours or my soonest available appointment.  Prevailing standard of care was able to be met in this audio-only visit.

## 2020-07-02 DIAGNOSIS — Z94.84 HISTORY OF AUTO STEM CELL TRANSPLANT: Primary | ICD-10-CM

## 2020-07-02 DIAGNOSIS — C90.00 MULTIPLE MYELOMA, REMISSION STATUS UNSPECIFIED: ICD-10-CM

## 2020-07-06 ENCOUNTER — INFUSION (OUTPATIENT)
Dept: INFUSION THERAPY | Facility: HOSPITAL | Age: 70
End: 2020-07-06
Attending: INTERNAL MEDICINE
Payer: MEDICARE

## 2020-07-06 VITALS
OXYGEN SATURATION: 99 % | HEART RATE: 67 BPM | SYSTOLIC BLOOD PRESSURE: 148 MMHG | DIASTOLIC BLOOD PRESSURE: 84 MMHG | RESPIRATION RATE: 20 BRPM | TEMPERATURE: 99 F

## 2020-07-06 DIAGNOSIS — C90.00 MULTIPLE MYELOMA NOT HAVING ACHIEVED REMISSION: ICD-10-CM

## 2020-07-06 DIAGNOSIS — C90.01 MULTIPLE MYELOMA IN REMISSION: Primary | ICD-10-CM

## 2020-07-06 PROCEDURE — 96401 CHEMO ANTI-NEOPL SQ/IM: CPT | Mod: HCNC

## 2020-07-06 PROCEDURE — 63600175 PHARM REV CODE 636 W HCPCS: Mod: JG,HCNC | Performed by: INTERNAL MEDICINE

## 2020-07-06 RX ORDER — SODIUM CHLORIDE 0.9 % (FLUSH) 0.9 %
10 SYRINGE (ML) INJECTION
Status: DISCONTINUED | OUTPATIENT
Start: 2020-07-06 | End: 2020-07-06 | Stop reason: HOSPADM

## 2020-07-06 RX ORDER — HEPARIN 100 UNIT/ML
500 SYRINGE INTRAVENOUS
Status: CANCELLED | OUTPATIENT
Start: 2020-07-06

## 2020-07-06 RX ORDER — SODIUM CHLORIDE 0.9 % (FLUSH) 0.9 %
10 SYRINGE (ML) INJECTION
Status: CANCELLED | OUTPATIENT
Start: 2020-07-06

## 2020-07-06 RX ORDER — BORTEZOMIB 3.5 MG/1
1.3 INJECTION, POWDER, LYOPHILIZED, FOR SOLUTION INTRAVENOUS; SUBCUTANEOUS
Status: COMPLETED | OUTPATIENT
Start: 2020-07-06 | End: 2020-07-06

## 2020-07-06 RX ORDER — BORTEZOMIB 3.5 MG/1
1.3 INJECTION, POWDER, LYOPHILIZED, FOR SOLUTION INTRAVENOUS; SUBCUTANEOUS
Status: CANCELLED | OUTPATIENT
Start: 2020-07-06

## 2020-07-06 RX ORDER — HEPARIN 100 UNIT/ML
500 SYRINGE INTRAVENOUS
Status: DISCONTINUED | OUTPATIENT
Start: 2020-07-06 | End: 2020-07-06 | Stop reason: HOSPADM

## 2020-07-06 RX ADMIN — BORTEZOMIB 2.3 MG: 3.5 INJECTION, POWDER, LYOPHILIZED, FOR SOLUTION INTRAVENOUS; SUBCUTANEOUS at 10:07

## 2020-07-13 ENCOUNTER — TELEPHONE (OUTPATIENT)
Dept: HEMATOLOGY/ONCOLOGY | Facility: CLINIC | Age: 70
End: 2020-07-13

## 2020-07-13 NOTE — TELEPHONE ENCOUNTER
Dr Napoles,    We received a Rx request for mag oxide 400mg  ( sig 1 by mouth twice a day )   From [facility] pharmacy .      Please note,  Last mag documented appears to be in march and was normal.      Please will you address:     * if mg level  Needed prior to re-order?   Or D/c medication .  ( mg, not included in CMP, must order separate )       * when should pt see you in clinic.--   See that patient is on for velcade Monday 20th -   Last seen by Dr Gamez late June ?      Thanks

## 2020-07-16 ENCOUNTER — TELEPHONE (OUTPATIENT)
Dept: HEMATOLOGY/ONCOLOGY | Facility: CLINIC | Age: 70
End: 2020-07-16

## 2020-07-16 NOTE — TELEPHONE ENCOUNTER
Advised pt to stop taking magnesium supplement per Dr. Tineo. Pt verbalized understanding. No questions or concerns stated at this time.

## 2020-07-20 ENCOUNTER — INFUSION (OUTPATIENT)
Dept: INFUSION THERAPY | Facility: HOSPITAL | Age: 70
End: 2020-07-20
Attending: INTERNAL MEDICINE
Payer: MEDICARE

## 2020-07-20 VITALS
HEIGHT: 63 IN | SYSTOLIC BLOOD PRESSURE: 146 MMHG | WEIGHT: 164.88 LBS | DIASTOLIC BLOOD PRESSURE: 89 MMHG | BODY MASS INDEX: 29.21 KG/M2 | RESPIRATION RATE: 16 BRPM | HEART RATE: 84 BPM | TEMPERATURE: 98 F

## 2020-07-20 DIAGNOSIS — C90.00 MULTIPLE MYELOMA NOT HAVING ACHIEVED REMISSION: ICD-10-CM

## 2020-07-20 DIAGNOSIS — E83.42 HYPOMAGNESEMIA: Primary | ICD-10-CM

## 2020-07-20 DIAGNOSIS — C90.01 MULTIPLE MYELOMA IN REMISSION: Primary | ICD-10-CM

## 2020-07-20 PROCEDURE — 96401 CHEMO ANTI-NEOPL SQ/IM: CPT | Mod: HCNC

## 2020-07-20 PROCEDURE — 63600175 PHARM REV CODE 636 W HCPCS: Mod: JG,HCNC | Performed by: INTERNAL MEDICINE

## 2020-07-20 RX ORDER — BORTEZOMIB 3.5 MG/1
1.3 INJECTION, POWDER, LYOPHILIZED, FOR SOLUTION INTRAVENOUS; SUBCUTANEOUS
Status: COMPLETED | OUTPATIENT
Start: 2020-07-20 | End: 2020-07-20

## 2020-07-20 RX ORDER — HEPARIN 100 UNIT/ML
500 SYRINGE INTRAVENOUS
Status: CANCELLED | OUTPATIENT
Start: 2020-07-20

## 2020-07-20 RX ORDER — BORTEZOMIB 3.5 MG/1
1.3 INJECTION, POWDER, LYOPHILIZED, FOR SOLUTION INTRAVENOUS; SUBCUTANEOUS
Status: CANCELLED | OUTPATIENT
Start: 2020-07-20

## 2020-07-20 RX ORDER — SODIUM CHLORIDE 0.9 % (FLUSH) 0.9 %
10 SYRINGE (ML) INJECTION
Status: CANCELLED | OUTPATIENT
Start: 2020-07-20

## 2020-07-20 RX ADMIN — BORTEZOMIB 2.3 MG: 3.5 INJECTION, POWDER, LYOPHILIZED, FOR SOLUTION INTRAVENOUS; SUBCUTANEOUS at 10:07

## 2020-07-30 ENCOUNTER — PES CALL (OUTPATIENT)
Dept: ADMINISTRATIVE | Facility: CLINIC | Age: 70
End: 2020-07-30

## 2020-07-31 ENCOUNTER — LAB VISIT (OUTPATIENT)
Dept: LAB | Facility: HOSPITAL | Age: 70
End: 2020-07-31
Attending: INTERNAL MEDICINE
Payer: MEDICARE

## 2020-07-31 DIAGNOSIS — E83.42 HYPOMAGNESEMIA: ICD-10-CM

## 2020-07-31 DIAGNOSIS — Z94.81 STATUS POST AUTOLOGOUS BONE MARROW TRANSPLANT: ICD-10-CM

## 2020-07-31 DIAGNOSIS — D50.9 IRON DEFICIENCY ANEMIA, UNSPECIFIED IRON DEFICIENCY ANEMIA TYPE: ICD-10-CM

## 2020-07-31 DIAGNOSIS — C90.01 MULTIPLE MYELOMA IN REMISSION: ICD-10-CM

## 2020-07-31 LAB
ABO + RH BLD: NORMAL
ALBUMIN SERPL BCP-MCNC: 3.9 G/DL (ref 3.5–5.2)
ALP SERPL-CCNC: 71 U/L (ref 55–135)
ALT SERPL W/O P-5'-P-CCNC: 22 U/L (ref 10–44)
ANION GAP SERPL CALC-SCNC: 11 MMOL/L (ref 8–16)
AST SERPL-CCNC: 25 U/L (ref 10–40)
BASOPHILS # BLD AUTO: 0.04 K/UL (ref 0–0.2)
BASOPHILS NFR BLD: 0.9 % (ref 0–1.9)
BILIRUB SERPL-MCNC: 0.3 MG/DL (ref 0.1–1)
BLD GP AB SCN CELLS X3 SERPL QL: NORMAL
BUN SERPL-MCNC: 9 MG/DL (ref 8–23)
CALCIUM SERPL-MCNC: 9.8 MG/DL (ref 8.7–10.5)
CHLORIDE SERPL-SCNC: 108 MMOL/L (ref 95–110)
CO2 SERPL-SCNC: 26 MMOL/L (ref 23–29)
CREAT SERPL-MCNC: 1 MG/DL (ref 0.5–1.4)
DIFFERENTIAL METHOD: ABNORMAL
EOSINOPHIL # BLD AUTO: 0.1 K/UL (ref 0–0.5)
EOSINOPHIL NFR BLD: 3.1 % (ref 0–8)
ERYTHROCYTE [DISTWIDTH] IN BLOOD BY AUTOMATED COUNT: 16.4 % (ref 11.5–14.5)
EST. GFR  (AFRICAN AMERICAN): >60 ML/MIN/1.73 M^2
EST. GFR  (NON AFRICAN AMERICAN): 56.8 ML/MIN/1.73 M^2
FERRITIN SERPL-MCNC: 38 NG/ML (ref 20–300)
GLUCOSE SERPL-MCNC: 110 MG/DL (ref 70–110)
HCT VFR BLD AUTO: 37.5 % (ref 37–48.5)
HGB BLD-MCNC: 11.4 G/DL (ref 12–16)
IMM GRANULOCYTES # BLD AUTO: 0.02 K/UL (ref 0–0.04)
IMM GRANULOCYTES NFR BLD AUTO: 0.4 % (ref 0–0.5)
LYMPHOCYTES # BLD AUTO: 0.6 K/UL (ref 1–4.8)
LYMPHOCYTES NFR BLD: 12.9 % (ref 18–48)
MAGNESIUM SERPL-MCNC: 1.8 MG/DL (ref 1.6–2.6)
MCH RBC QN AUTO: 26.3 PG (ref 27–31)
MCHC RBC AUTO-ENTMCNC: 30.4 G/DL (ref 32–36)
MCV RBC AUTO: 86 FL (ref 82–98)
MONOCYTES # BLD AUTO: 0.6 K/UL (ref 0.3–1)
MONOCYTES NFR BLD: 12.7 % (ref 4–15)
NEUTROPHILS # BLD AUTO: 3.1 K/UL (ref 1.8–7.7)
NEUTROPHILS NFR BLD: 70 % (ref 38–73)
NRBC BLD-RTO: 0 /100 WBC
PHOSPHATE SERPL-MCNC: 4 MG/DL (ref 2.7–4.5)
PLATELET # BLD AUTO: 158 K/UL (ref 150–350)
PMV BLD AUTO: 11.9 FL (ref 9.2–12.9)
POTASSIUM SERPL-SCNC: 3.6 MMOL/L (ref 3.5–5.1)
PROT SERPL-MCNC: 7.3 G/DL (ref 6–8.4)
RBC # BLD AUTO: 4.34 M/UL (ref 4–5.4)
SODIUM SERPL-SCNC: 145 MMOL/L (ref 136–145)
WBC # BLD AUTO: 4.48 K/UL (ref 3.9–12.7)

## 2020-07-31 PROCEDURE — 86901 BLOOD TYPING SEROLOGIC RH(D): CPT | Mod: HCNC

## 2020-07-31 PROCEDURE — 85025 COMPLETE CBC W/AUTO DIFF WBC: CPT | Mod: HCNC

## 2020-07-31 PROCEDURE — 84100 ASSAY OF PHOSPHORUS: CPT | Mod: HCNC

## 2020-07-31 PROCEDURE — 83735 ASSAY OF MAGNESIUM: CPT | Mod: HCNC

## 2020-07-31 PROCEDURE — 80053 COMPREHEN METABOLIC PANEL: CPT | Mod: HCNC

## 2020-07-31 PROCEDURE — 82728 ASSAY OF FERRITIN: CPT | Mod: HCNC

## 2020-08-03 ENCOUNTER — HOSPITAL ENCOUNTER (OUTPATIENT)
Dept: RADIOLOGY | Facility: HOSPITAL | Age: 70
Discharge: HOME | End: 2020-08-03
Attending: INTERNAL MEDICINE
Payer: MEDICARE

## 2020-08-03 ENCOUNTER — CLINICAL SUPPORT (OUTPATIENT)
Dept: INFECTIOUS DISEASES | Facility: CLINIC | Age: 70
End: 2020-08-03
Payer: MEDICARE

## 2020-08-03 ENCOUNTER — OFFICE VISIT (OUTPATIENT)
Dept: HEMATOLOGY/ONCOLOGY | Facility: CLINIC | Age: 70
End: 2020-08-03
Payer: MEDICARE

## 2020-08-03 ENCOUNTER — INFUSION (OUTPATIENT)
Dept: INFUSION THERAPY | Facility: HOSPITAL | Age: 70
End: 2020-08-03
Payer: MEDICARE

## 2020-08-03 VITALS
DIASTOLIC BLOOD PRESSURE: 91 MMHG | TEMPERATURE: 98 F | SYSTOLIC BLOOD PRESSURE: 156 MMHG | OXYGEN SATURATION: 91 % | WEIGHT: 176.5 LBS | RESPIRATION RATE: 16 BRPM | HEART RATE: 82 BPM | BODY MASS INDEX: 31.27 KG/M2

## 2020-08-03 VITALS — BODY MASS INDEX: 31.27 KG/M2 | WEIGHT: 176.5 LBS | HEIGHT: 63 IN

## 2020-08-03 DIAGNOSIS — R06.09 DYSPNEA ON EXERTION: ICD-10-CM

## 2020-08-03 DIAGNOSIS — Z94.84 HISTORY OF AUTO STEM CELL TRANSPLANT: ICD-10-CM

## 2020-08-03 DIAGNOSIS — C90.01 MULTIPLE MYELOMA IN REMISSION: Primary | ICD-10-CM

## 2020-08-03 DIAGNOSIS — R06.00 DYSPNEA, UNSPECIFIED TYPE: ICD-10-CM

## 2020-08-03 DIAGNOSIS — I82.4Y2 DEEP VEIN THROMBOSIS (DVT) OF PROXIMAL VEIN OF LEFT LOWER EXTREMITY, UNSPECIFIED CHRONICITY: ICD-10-CM

## 2020-08-03 DIAGNOSIS — C90.00 MULTIPLE MYELOMA, REMISSION STATUS UNSPECIFIED: Primary | ICD-10-CM

## 2020-08-03 DIAGNOSIS — R52 PAIN: ICD-10-CM

## 2020-08-03 DIAGNOSIS — I71.9 DESCENDING AORTIC ANEURYSM: ICD-10-CM

## 2020-08-03 DIAGNOSIS — C90.00 MULTIPLE MYELOMA NOT HAVING ACHIEVED REMISSION: ICD-10-CM

## 2020-08-03 DIAGNOSIS — R07.9 CHEST PAIN, UNSPECIFIED TYPE: ICD-10-CM

## 2020-08-03 DIAGNOSIS — R60.9 SWELLING: Primary | ICD-10-CM

## 2020-08-03 DIAGNOSIS — R60.9 SWELLING: ICD-10-CM

## 2020-08-03 PROCEDURE — G0009 DTAP (5 PERTUSSIS ANTIGENS) VACCINE LESS THAN 7YO IM: ICD-10-PCS | Mod: 59,HCNC,BMT,S$GLB | Performed by: INTERNAL MEDICINE

## 2020-08-03 PROCEDURE — 90700 DTAP VACCINE < 7 YRS IM: CPT | Mod: HCNC,BMT,S$GLB, | Performed by: INTERNAL MEDICINE

## 2020-08-03 PROCEDURE — 3080F PR MOST RECENT DIASTOLIC BLOOD PRESSURE >= 90 MM HG: ICD-10-PCS | Mod: HCNC,BMT,CPTII,S$GLB | Performed by: INTERNAL MEDICINE

## 2020-08-03 PROCEDURE — 90648 HIB PRP-T VACCINE 4 DOSE IM: CPT | Mod: HCNC,BMT,S$GLB, | Performed by: INTERNAL MEDICINE

## 2020-08-03 PROCEDURE — 90471 HIB PRP-T CONJUGATE VACCINE 4 DOSE IM: ICD-10-PCS | Mod: HCNC,BMT,S$GLB, | Performed by: INTERNAL MEDICINE

## 2020-08-03 PROCEDURE — 99999 PR PBB SHADOW E&M-EST. PATIENT-LVL IV: CPT | Mod: PBBFAC,HCNC,BMT, | Performed by: INTERNAL MEDICINE

## 2020-08-03 PROCEDURE — 25500020 PHARM REV CODE 255: Mod: HCNC | Performed by: INTERNAL MEDICINE

## 2020-08-03 PROCEDURE — 90471 IMMUNIZATION ADMIN: CPT | Mod: HCNC,BMT,S$GLB, | Performed by: INTERNAL MEDICINE

## 2020-08-03 PROCEDURE — 93971 EXTREMITY STUDY: CPT | Mod: TC,HCNC,LT

## 2020-08-03 PROCEDURE — 90746 HEPATITIS B VACCINE ADULT IM: ICD-10-PCS | Mod: HCNC,BMT,S$GLB, | Performed by: INTERNAL MEDICINE

## 2020-08-03 PROCEDURE — 90713 POLIOVIRUS VACCINE IPV SQ/IM: ICD-10-PCS | Mod: HCNC,BMT,S$GLB, | Performed by: INTERNAL MEDICINE

## 2020-08-03 PROCEDURE — 3077F PR MOST RECENT SYSTOLIC BLOOD PRESSURE >= 140 MM HG: ICD-10-PCS | Mod: HCNC,BMT,CPTII,S$GLB | Performed by: INTERNAL MEDICINE

## 2020-08-03 PROCEDURE — 93971 US LOWER EXTREMITY VEINS LEFT: ICD-10-PCS | Mod: 26,HCNC,BMT,LT | Performed by: RADIOLOGY

## 2020-08-03 PROCEDURE — 1159F MED LIST DOCD IN RCRD: CPT | Mod: HCNC,BMT,S$GLB, | Performed by: INTERNAL MEDICINE

## 2020-08-03 PROCEDURE — 3008F PR BODY MASS INDEX (BMI) DOCUMENTED: ICD-10-PCS | Mod: HCNC,BMT,CPTII,S$GLB | Performed by: INTERNAL MEDICINE

## 2020-08-03 PROCEDURE — 1159F PR MEDICATION LIST DOCUMENTED IN MEDICAL RECORD: ICD-10-PCS | Mod: HCNC,BMT,S$GLB, | Performed by: INTERNAL MEDICINE

## 2020-08-03 PROCEDURE — 99999 PR PBB SHADOW E&M-EST. PATIENT-LVL IV: ICD-10-PCS | Mod: PBBFAC,HCNC,BMT, | Performed by: INTERNAL MEDICINE

## 2020-08-03 PROCEDURE — 90670 PNEUMOCOCCAL CONJUGATE VACCINE 13-VALENT LESS THAN 5YO & GREATER THAN: ICD-10-PCS | Mod: HCNC,BMT,S$GLB, | Performed by: INTERNAL MEDICINE

## 2020-08-03 PROCEDURE — 1101F PT FALLS ASSESS-DOCD LE1/YR: CPT | Mod: HCNC,BMT,CPTII,S$GLB | Performed by: INTERNAL MEDICINE

## 2020-08-03 PROCEDURE — 1126F AMNT PAIN NOTED NONE PRSNT: CPT | Mod: HCNC,BMT,S$GLB, | Performed by: INTERNAL MEDICINE

## 2020-08-03 PROCEDURE — G0010 ADMIN HEPATITIS B VACCINE: HCPCS | Mod: 59,HCNC,BMT,S$GLB | Performed by: INTERNAL MEDICINE

## 2020-08-03 PROCEDURE — 3077F SYST BP >= 140 MM HG: CPT | Mod: HCNC,BMT,CPTII,S$GLB | Performed by: INTERNAL MEDICINE

## 2020-08-03 PROCEDURE — G0010 HEPATITIS B VACCINE ADULT IM: ICD-10-PCS | Mod: 59,HCNC,BMT,S$GLB | Performed by: INTERNAL MEDICINE

## 2020-08-03 PROCEDURE — 99214 PR OFFICE/OUTPT VISIT, EST, LEVL IV, 30-39 MIN: ICD-10-PCS | Mod: HCNC,BMT,S$GLB, | Performed by: INTERNAL MEDICINE

## 2020-08-03 PROCEDURE — 3008F BODY MASS INDEX DOCD: CPT | Mod: HCNC,BMT,CPTII,S$GLB | Performed by: INTERNAL MEDICINE

## 2020-08-03 PROCEDURE — 1101F PR PT FALLS ASSESS DOC 0-1 FALLS W/OUT INJ PAST YR: ICD-10-PCS | Mod: HCNC,BMT,CPTII,S$GLB | Performed by: INTERNAL MEDICINE

## 2020-08-03 PROCEDURE — 93971 EXTREMITY STUDY: CPT | Mod: 26,HCNC,BMT,LT | Performed by: RADIOLOGY

## 2020-08-03 PROCEDURE — 90713 POLIOVIRUS IPV SC/IM: CPT | Mod: HCNC,BMT,S$GLB, | Performed by: INTERNAL MEDICINE

## 2020-08-03 PROCEDURE — 96401 CHEMO ANTI-NEOPL SQ/IM: CPT | Mod: HCNC

## 2020-08-03 PROCEDURE — 99214 OFFICE O/P EST MOD 30 MIN: CPT | Mod: HCNC,BMT,S$GLB, | Performed by: INTERNAL MEDICINE

## 2020-08-03 PROCEDURE — 71275 CTA CHEST NON CORONARY: ICD-10-PCS | Mod: 26,HCNC,BMT, | Performed by: RADIOLOGY

## 2020-08-03 PROCEDURE — 90648 HIB PRP-T CONJUGATE VACCINE 4 DOSE IM: ICD-10-PCS | Mod: HCNC,BMT,S$GLB, | Performed by: INTERNAL MEDICINE

## 2020-08-03 PROCEDURE — 71275 CT ANGIOGRAPHY CHEST: CPT | Mod: TC,HCNC

## 2020-08-03 PROCEDURE — 90472 IMMUNIZATION ADMIN EACH ADD: CPT | Mod: HCNC,BMT,S$GLB, | Performed by: INTERNAL MEDICINE

## 2020-08-03 PROCEDURE — 1126F PR PAIN SEVERITY QUANTIFIED, NO PAIN PRESENT: ICD-10-PCS | Mod: HCNC,BMT,S$GLB, | Performed by: INTERNAL MEDICINE

## 2020-08-03 PROCEDURE — 63600175 PHARM REV CODE 636 W HCPCS: Mod: JG,HCNC | Performed by: INTERNAL MEDICINE

## 2020-08-03 PROCEDURE — 90472 PNEUMOCOCCAL CONJUGATE VACCINE 13-VALENT LESS THAN 5YO & GREATER THAN: ICD-10-PCS | Mod: HCNC,BMT,S$GLB, | Performed by: INTERNAL MEDICINE

## 2020-08-03 PROCEDURE — 3080F DIAST BP >= 90 MM HG: CPT | Mod: HCNC,BMT,CPTII,S$GLB | Performed by: INTERNAL MEDICINE

## 2020-08-03 PROCEDURE — 71275 CT ANGIOGRAPHY CHEST: CPT | Mod: 26,HCNC,BMT, | Performed by: RADIOLOGY

## 2020-08-03 PROCEDURE — G0009 ADMIN PNEUMOCOCCAL VACCINE: HCPCS | Mod: 59,HCNC,BMT,S$GLB | Performed by: INTERNAL MEDICINE

## 2020-08-03 PROCEDURE — 90746 HEPB VACCINE 3 DOSE ADULT IM: CPT | Mod: HCNC,BMT,S$GLB, | Performed by: INTERNAL MEDICINE

## 2020-08-03 PROCEDURE — 90670 PCV13 VACCINE IM: CPT | Mod: HCNC,BMT,S$GLB, | Performed by: INTERNAL MEDICINE

## 2020-08-03 PROCEDURE — 90700 DTAP (5 PERTUSSIS ANTIGENS) VACCINE LESS THAN 7YO IM: ICD-10-PCS | Mod: HCNC,BMT,S$GLB, | Performed by: INTERNAL MEDICINE

## 2020-08-03 RX ORDER — SODIUM CHLORIDE 0.9 % (FLUSH) 0.9 %
10 SYRINGE (ML) INJECTION
Status: CANCELLED | OUTPATIENT
Start: 2020-08-12

## 2020-08-03 RX ORDER — BORTEZOMIB 3.5 MG/1
1.3 INJECTION, POWDER, LYOPHILIZED, FOR SOLUTION INTRAVENOUS; SUBCUTANEOUS
Status: CANCELLED | OUTPATIENT
Start: 2020-08-26

## 2020-08-03 RX ORDER — HEPARIN 100 UNIT/ML
500 SYRINGE INTRAVENOUS
Status: CANCELLED | OUTPATIENT
Start: 2020-08-26

## 2020-08-03 RX ORDER — BORTEZOMIB 3.5 MG/1
1.3 INJECTION, POWDER, LYOPHILIZED, FOR SOLUTION INTRAVENOUS; SUBCUTANEOUS
Status: CANCELLED | OUTPATIENT
Start: 2020-08-03

## 2020-08-03 RX ORDER — SODIUM CHLORIDE 0.9 % (FLUSH) 0.9 %
10 SYRINGE (ML) INJECTION
Status: CANCELLED | OUTPATIENT
Start: 2020-08-03

## 2020-08-03 RX ORDER — BORTEZOMIB 3.5 MG/1
1.3 INJECTION, POWDER, LYOPHILIZED, FOR SOLUTION INTRAVENOUS; SUBCUTANEOUS
Status: CANCELLED | OUTPATIENT
Start: 2020-08-12

## 2020-08-03 RX ORDER — HEPARIN 100 UNIT/ML
500 SYRINGE INTRAVENOUS
Status: CANCELLED | OUTPATIENT
Start: 2020-08-03

## 2020-08-03 RX ORDER — SODIUM CHLORIDE 0.9 % (FLUSH) 0.9 %
10 SYRINGE (ML) INJECTION
Status: CANCELLED | OUTPATIENT
Start: 2020-08-26

## 2020-08-03 RX ORDER — HEPARIN 100 UNIT/ML
500 SYRINGE INTRAVENOUS
Status: CANCELLED | OUTPATIENT
Start: 2020-08-12

## 2020-08-03 RX ORDER — BORTEZOMIB 3.5 MG/1
1.3 INJECTION, POWDER, LYOPHILIZED, FOR SOLUTION INTRAVENOUS; SUBCUTANEOUS
Status: COMPLETED | OUTPATIENT
Start: 2020-08-03 | End: 2020-08-03

## 2020-08-03 RX ADMIN — IOHEXOL 100 ML: 350 INJECTION, SOLUTION INTRAVENOUS at 08:08

## 2020-08-03 RX ADMIN — BORTEZOMIB 2.3 MG: 3.5 INJECTION, POWDER, LYOPHILIZED, FOR SOLUTION INTRAVENOUS; SUBCUTANEOUS at 01:08

## 2020-08-03 NOTE — PROGRESS NOTES
PATIENT: Shelby Thompson  MRN: [id number]  DATE: 8/3/2020    Diagnosis:   1. Dyspnea, unspecified type        Chief Complaint: No chief complaint on file.    Oncologic History:      Oncologic History     Oncologic Treatment     Pathology           Subjective:    Initial History: Ms. Thompson is a 70 y.o. female who returns for follow up to multiple myeloma.  She is tolerating Velcaid well, has stable b/l LE neuropathy. Also notes L calf pain and swelling which she feels is chronic from chemo. Overall, she has noted dyspnea for last 6m worse in last 2w. Now noticeable converasational dyspnea. She attributes decr exercise tolerance due to minimal ambulation during quarantine. Normally very mobile, has treadmill at home which recently broke and has been trying to use exercise bike but becomes too dyspneic.  Had niece wheel her in today which is unusual. Has also noticed new memory deficits, she loses train of thought in conversation periodically. No safety concerns.     Past Medical History:   Past Medical History:   Diagnosis Date    Acute respiratory failure with hypoxia 4/30/2019    FUENTES (acute kidney injury) 5/1/2019    Allergy     Anemia     Aortic aneurysm     Breast cancer 10/2011    left breast Stage 0 DCIS    Chronic diastolic congestive heart failure 11/6/2015    Colon polyp     GERD (gastroesophageal reflux disease)     Hiatal hernia     History of colonic polyps     Hx of psychiatric care     Zoloft    HX: breast cancer     Hyperlipemia     Hypertension     ICH (intracerebral hemorrhage)     Major depressive disorder, single episode, mild 6/23/2016    Nuclear sclerosis 7/21/2014    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 7/21/2014    PAD (peripheral artery disease) 11/6/2015    Psychiatric problem     Statin-induced rhabdomyolysis     4/2015     Stroke 4/2011    Type 2 diabetes mellitus without complication, without long-term current use of insulin  2/10/2020    Walker as ambulation aid        Past Surgical HIstory:   Past Surgical History:   Procedure Laterality Date    APPENDECTOMY      BONE MARROW BIOPSY Left 10/3/2019    Procedure: Biopsy-bone marrow;  Surgeon: Jere Tineo MD;  Location: [address];  Service: Oncology;  Laterality: Left;    BREAST BIOPSY Left 10/2011    BREAST LUMPECTOMY Left 2011    DCIS    COLONOSCOPY      COLONOSCOPY N/A 1/9/2020    Procedure: COLONOSCOPY;  Surgeon: Quang De La Cruz MD;  [facility]  Service: Endoscopy;  Laterality: N/A;    CYST REMOVAL      back    ESOPHAGOGASTRODUODENOSCOPY  07/2016    duodenal angioectasia    ESOPHAGOGASTRODUODENOSCOPY N/A 1/9/2020    Procedure: EGD (ESOPHAGOGASTRODUODENOSCOPY);  Surgeon: Quang De La Cruz MD;  [facility]  Service: Endoscopy;  Laterality: N/A;    FOOT FRACTURE SURGERY  10/2012    right foot, with R hallux valgus repair    FRACTURE SURGERY Right     broken toe repiar    HEMORRHOID SURGERY      LIVER BIOPSY  04/2015    essentially normal, no fibrosis    TUBAL LIGATION      UPPER GASTROINTESTINAL ENDOSCOPY         Family History:   Family History   Problem Relation Age of Onset    No Known Problems Sister     Cancer Sister 63        Lung Cancer    No Known Problems Mother     Cancer Father     No Known Problems Brother     Hypertension Daughter     Fibroids Daughter         uterine    Hypertension Son     Breast cancer Sister 62    No Known Problems Brother     No Known Problems Maternal Aunt     No Known Problems Maternal Uncle     No Known Problems Paternal Aunt     No Known Problems Paternal Uncle     Hypertension Maternal Grandmother     No Known Problems Maternal Grandfather     No Known Problems Paternal Grandmother     No Known Problems Paternal Grandfather     Ovarian cancer Neg Hx     Colon cancer Neg Hx     Tremor Neg Hx     Amblyopia Neg Hx     Blindness Neg Hx     Cataracts Neg Hx      Diabetes Neg Hx     Glaucoma Neg Hx     Macular degeneration Neg Hx     Retinal detachment Neg Hx     Strabismus Neg Hx     Stroke Neg Hx     Thyroid disease Neg Hx     Esophageal cancer Neg Hx     Rectal cancer Neg Hx     Stomach cancer Neg Hx     Ulcerative colitis Neg Hx     Crohn's disease Neg Hx     Irritable bowel syndrome Neg Hx     Celiac disease Neg Hx        Social History:  reports that she quit smoking about 9 years ago. Her smoking use included cigarettes. She has a 10.00 pack-year smoking history. She has never used smokeless tobacco. She reports previous alcohol use. She reports that she does not use drugs.    Allergies:  Review of patient's allergies indicates:   Allergen Reactions    Lipitor [atorvastatin]      Itching, elevated cpk, muscle aches, statin induced myositis       Medications:  Current Outpatient Medications   Medication Sig Dispense Refill    acyclovir (ZOVIRAX) 800 MG Tab Take 1 tablet (800 mg total) by mouth 2 (two) times daily. 60 tablet 11    alirocumab (PRALUENT PEN) 75 mg/mL PnIj Inject 1 mL (75 mg total) into the skin every 14 (fourteen) days. 6 Syringe 3    amLODIPine (NORVASC) 5 MG tablet Take 1 tablet (5 mg total) by mouth once daily. 90 tablet 3    calcium-vitamin D3 (OS- + D3) 500 mg(1,250mg) -200 unit per tablet Take 1 tablet by mouth 2 (two) times daily. 60 tablet 0    ferrous gluconate 324 mg (37.5 mg iron) Tab TAKE 1 TABLET BY MOUTH 2 (TWO) TIMES DAILY WITH MEALS. 180 tablet 0    gabapentin (NEURONTIN) 300 MG capsule Take 1 capsule (300mg) by mouth every morning and 2 capsules (600mg) every evening. 90 capsule 5    loratadine (CLARITIN) 10 mg tablet Take 10 mg by mouth nightly.      magnesium oxide (MAG-OX) 400 mg (241.3 mg magnesium) tablet Take 1 tablet (400 mg total) by mouth 2 (two) times daily. 60 tablet 2    omeprazole (PRILOSEC) 40 MG capsule Take 1 capsule (40 mg total) by mouth once daily. 90 capsule 3    ondansetron (ZOFRAN) 4 MG  tablet Take 1 tablet (4 mg total) by mouth every 6 (six) hours as needed for Nausea. 30 tablet 1    potassium chloride SA (K-DUR,KLOR-CON) 20 MEQ tablet Take 1 tablet (20 mEq total) by mouth once daily. 30 tablet 11    sertraline (ZOLOFT) 50 MG tablet Take 1 tablet (50 mg total) by mouth once daily. 90 tablet 3    traMADoL (ULTRAM) 50 mg tablet Take 1 tablet (50 mg total) by mouth 2 (two) times daily as needed for Pain. 60 tablet 3     No current facility-administered medications for this visit.        Review of Systems   Constitutional: Negative.    HENT: Negative.    Respiratory: Positive for shortness of breath. Negative for cough and chest tightness.    Cardiovascular: Positive for leg swelling. Negative for chest pain and palpitations.   Gastrointestinal: Positive for abdominal distention.   Genitourinary: Negative.    Musculoskeletal: Negative.    Skin: Negative.    Neurological:        Memory deficits  Peripheral neuropathy   Psychiatric/Behavioral: Negative.        ECOG Performance Status: 0   Objective:      Vitals:   Vitals:    08/03/20 1051   BP: (!) 156/91   BP Location: Right arm   Patient Position: Sitting   BP Method: Medium (Automatic)   Pulse: 82   Resp: 16   Temp: 98.2 °F (36.8 °C)   TempSrc: Oral   SpO2: (!) 91%   Weight: 80.1 kg (176 lb 8 oz)        Physical Exam  Constitutional:       Appearance: Normal appearance.   HENT:      Head: Normocephalic.      Nose: Nose normal.      Mouth/Throat:      Mouth: Mucous membranes are moist.   Eyes:      Pupils: Pupils are equal, round, and reactive to light.   Neck:      Musculoskeletal: Normal range of motion.   Cardiovascular:      Rate and Rhythm: Normal rate and regular rhythm.      Comments: Conversational dyspnea  Pulmonary:      Effort: Pulmonary effort is normal.      Breath sounds: Normal breath sounds.   Abdominal:      General: There is distension.      Palpations: Abdomen is soft. There is no mass.      Tenderness: There is no abdominal  tenderness.   Musculoskeletal: Normal range of motion.   Skin:     General: Skin is warm.      Comments: Darkening b/l LE skin   Neurological:      General: No focal deficit present.      Mental Status: She is alert and oriented to person, place, and time.         Laboratory Data:  Lab Visit on 07/31/2020   Component Date Value Ref Range Status    Phosphorus 07/31/2020 4.0  2.7 - 4.5 mg/dL Final    Group & Rh 07/31/2020 O POS   Final    Indirect Richard 07/31/2020 NEG   Final    WBC 07/31/2020 4.48  3.90 - 12.70 K/uL Final    RBC 07/31/2020 4.34  4.00 - 5.40 M/uL Final    Hemoglobin 07/31/2020 11.4* 12.0 - 16.0 g/dL Final    Hematocrit 07/31/2020 37.5  37.0 - 48.5 % Final    Mean Corpuscular Volume 07/31/2020 86  82 - 98 fL Final    Mean Corpuscular Hemoglobin 07/31/2020 26.3* 27.0 - 31.0 pg Final    Mean Corpuscular Hemoglobin Conc 07/31/2020 30.4* 32.0 - 36.0 g/dL Final    RDW 07/31/2020 16.4* 11.5 - 14.5 % Final    Platelets 07/31/2020 158  150 - 350 K/uL Final    MPV 07/31/2020 11.9  9.2 - 12.9 fL Final    Immature Granulocytes 07/31/2020 0.4  0.0 - 0.5 % Final    Gran # (ANC) 07/31/2020 3.1  1.8 - 7.7 K/uL Final    Immature Grans (Abs) 07/31/2020 0.02  0.00 - 0.04 K/uL Final    Comment: Mild elevation in immature granulocytes is non specific and   can be seen in a variety of conditions including stress response,   acute inflammation, trauma and pregnancy. Correlation with other   laboratory and clinical findings is essential.      Lymph # 07/31/2020 0.6* 1.0 - 4.8 K/uL Final    Mono # 07/31/2020 0.6  0.3 - 1.0 K/uL Final    Eos # 07/31/2020 0.1  0.0 - 0.5 K/uL Final    Baso # 07/31/2020 0.04  0.00 - 0.20 K/uL Final    nRBC 07/31/2020 0  0 /100 WBC Final    Gran% 07/31/2020 70.0  38.0 - 73.0 % Final    Lymph% 07/31/2020 12.9* 18.0 - 48.0 % Final    Mono% 07/31/2020 12.7  4.0 - 15.0 % Final    Eosinophil% 07/31/2020 3.1  0.0 - 8.0 % Final    Basophil% 07/31/2020 0.9  0.0 - 1.9 % Final     Differential Method 07/31/2020 Automated   Final    Sodium 07/31/2020 145  136 - 145 mmol/L Final    Potassium 07/31/2020 3.6  3.5 - 5.1 mmol/L Final    Chloride 07/31/2020 108  95 - 110 mmol/L Final    CO2 07/31/2020 26  23 - 29 mmol/L Final    Glucose 07/31/2020 110  70 - 110 mg/dL Final    BUN, Bld 07/31/2020 9  8 - 23 mg/dL Final    Creatinine 07/31/2020 1.0  0.5 - 1.4 mg/dL Final    Calcium 07/31/2020 9.8  8.7 - 10.5 mg/dL Final    Total Protein 07/31/2020 7.3  6.0 - 8.4 g/dL Final    Albumin 07/31/2020 3.9  3.5 - 5.2 g/dL Final    Total Bilirubin 07/31/2020 0.3  0.1 - 1.0 mg/dL Final    Comment: For infants and newborns, interpretation of results should be based  on gestational age, weight and in agreement with clinical  observations.  Premature Infant recommended reference ranges:  Up to 24 hours.............<8.0 mg/dL  Up to 48 hours............<12.0 mg/dL  3-5 days..................<15.0 mg/dL  6-29 days.................<15.0 mg/dL      Alkaline Phosphatase 07/31/2020 71  55 - 135 U/L Final    AST 07/31/2020 25  10 - 40 U/L Final    ALT 07/31/2020 22  10 - 44 U/L Final    Anion Gap 07/31/2020 11  8 - 16 mmol/L Final    eGFR if African American 07/31/2020 >60.0  >60 mL/min/1.73 m^2 Final    eGFR if non African American 07/31/2020 56.8* >60 mL/min/1.73 m^2 Final    Comment: Calculation used to obtain the estimated glomerular filtration  rate (eGFR) is the CKD-EPI equation.       Ferritin 07/31/2020 38  20.0 - 300.0 ng/mL Final    Magnesium 07/31/2020 1.8  1.6 - 2.6 mg/dL Final         Imaging:      Assessment:       1. Dyspnea, unspecified type           Plan:   Ms. Thompson is a 70 y.o. female who returns for follow up to multiple myeloma.  Day 173 post auto.     /MM  - cycle 7 Velcaid today & round 1 immunizations  - CTA and dopplers ordered urgently with high concern for DVT/PE, sx x6m worse in last 2w   - cbc, cmp, serum free light chains, quantitative immunoglobulins, serum  electropheresis, serum immunofixation, prior to next Velcaid inj 9/14    Patient discussed with attending physician, Dr. Noman Villanueva, PGYIV   Hematology/Oncology Fellow    Agree with Dr. Villanueva's history, physical, assessment, and plan with the following addendums.      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; day +173 post auto  On zhanna maintenance q 2 week tolerating well ;biochemical studies today cw with stable disease so continue   Has initiated vaccines   For SOB suspect deconditioning, possible CHF; LE US and CTA chest with no PE/parenchymal disease ; incidental descending thoracic aortic aneurysm noted; will refer to thoracic surgery   Suspect ACEVEDO more deconditioning; encourage light exercise; no overt signs heart failure on exam   Acyclovir while on velcade  Has recovered from transplant   -please refer to thoracic surgery   -please set up lab appt to redraw , serum free light chains, quantitative immunoglobulins, serum electropheresis, serum immunofixation; they werent linked to 7/31/20 labs  -schedule velcade injection 8/17, 8/31, 9/14  -cbc, cmp, serum free light chains, quantitative immunoglobulins, serum electropheresis, serum immunofixation, and MD appt prior to velcade injection on 9/14

## 2020-08-03 NOTE — PROGRESS NOTES
Ms. yepez received Polio, Hepatitis B, Prevnar 13, Dtap, and Hib vaccines   Tolerated well  Left unit in NAD

## 2020-08-03 NOTE — PROGRESS NOTES
High risk MM ; day +173 post auto  On zhanna maintenance; June bcs improved; not linked today; redraw  Vaccines today     -please set up lab appt to redraw , serum free light chains, quantitative immunoglobulins, serum electropheresis, serum immunofixation; they werent linked to 7/31/20 labs  -please schedule urgent CTA chest and left lower extremity ultrasound  today or tomorrow   -schedule velcade injection 8/17, 8/31, 9/14  -cbc, cmp, serum free light chains, quantitative immunoglobulins, serum electropheresis, serum immunofixation, and MD appt prior to velcade injection on 9/14

## 2020-08-03 NOTE — Clinical Note
-please set up lab appt to redraw today  , serum free light chains, quantitative immunoglobulins, serum electropheresis, serum immunofixation; they werent linked to 7/31/20 labs  -please schedule urgent CTA chest and left lower extremity ultrasound  today or tomorrow   -schedule velcade injection 8/17, 8/31, 9/14  -cbc, cmp, serum free light chains, quantitative immunoglobulins, serum electropheresis, serum immunofixation, and MD appt prior to velcade injection on 9/14

## 2020-08-06 ENCOUNTER — PATIENT OUTREACH (OUTPATIENT)
Dept: ADMINISTRATIVE | Facility: OTHER | Age: 70
End: 2020-08-06

## 2020-08-07 ENCOUNTER — OFFICE VISIT (OUTPATIENT)
Dept: VASCULAR SURGERY | Facility: CLINIC | Age: 70
End: 2020-08-07
Attending: SURGERY
Payer: MEDICARE

## 2020-08-07 VITALS
TEMPERATURE: 98 F | HEIGHT: 63 IN | BODY MASS INDEX: 30.51 KG/M2 | DIASTOLIC BLOOD PRESSURE: 81 MMHG | HEART RATE: 71 BPM | SYSTOLIC BLOOD PRESSURE: 147 MMHG | WEIGHT: 172.19 LBS

## 2020-08-07 DIAGNOSIS — I71.20 THORACIC AORTIC ANEURYSM WITHOUT RUPTURE: Primary | ICD-10-CM

## 2020-08-07 DIAGNOSIS — I71.9 DESCENDING AORTIC ANEURYSM: ICD-10-CM

## 2020-08-07 PROCEDURE — 99203 OFFICE O/P NEW LOW 30 MIN: CPT | Mod: HCNC,BMT,S$GLB, | Performed by: SURGERY

## 2020-08-07 PROCEDURE — 3079F DIAST BP 80-89 MM HG: CPT | Mod: HCNC,BMT,CPTII,S$GLB | Performed by: SURGERY

## 2020-08-07 PROCEDURE — 3008F BODY MASS INDEX DOCD: CPT | Mod: HCNC,BMT,CPTII,S$GLB | Performed by: SURGERY

## 2020-08-07 PROCEDURE — 3079F PR MOST RECENT DIASTOLIC BLOOD PRESSURE 80-89 MM HG: ICD-10-PCS | Mod: HCNC,BMT,CPTII,S$GLB | Performed by: SURGERY

## 2020-08-07 PROCEDURE — 1159F MED LIST DOCD IN RCRD: CPT | Mod: HCNC,BMT,S$GLB, | Performed by: SURGERY

## 2020-08-07 PROCEDURE — 3077F PR MOST RECENT SYSTOLIC BLOOD PRESSURE >= 140 MM HG: ICD-10-PCS | Mod: HCNC,BMT,CPTII,S$GLB | Performed by: SURGERY

## 2020-08-07 PROCEDURE — 1101F PR PT FALLS ASSESS DOC 0-1 FALLS W/OUT INJ PAST YR: ICD-10-PCS | Mod: HCNC,BMT,CPTII,S$GLB | Performed by: SURGERY

## 2020-08-07 PROCEDURE — 3008F PR BODY MASS INDEX (BMI) DOCUMENTED: ICD-10-PCS | Mod: HCNC,BMT,CPTII,S$GLB | Performed by: SURGERY

## 2020-08-07 PROCEDURE — 1101F PT FALLS ASSESS-DOCD LE1/YR: CPT | Mod: HCNC,BMT,CPTII,S$GLB | Performed by: SURGERY

## 2020-08-07 PROCEDURE — 1126F PR PAIN SEVERITY QUANTIFIED, NO PAIN PRESENT: ICD-10-PCS | Mod: HCNC,BMT,S$GLB, | Performed by: SURGERY

## 2020-08-07 PROCEDURE — 99203 PR OFFICE/OUTPT VISIT, NEW, LEVL III, 30-44 MIN: ICD-10-PCS | Mod: HCNC,BMT,S$GLB, | Performed by: SURGERY

## 2020-08-07 PROCEDURE — 1159F PR MEDICATION LIST DOCUMENTED IN MEDICAL RECORD: ICD-10-PCS | Mod: HCNC,BMT,S$GLB, | Performed by: SURGERY

## 2020-08-07 PROCEDURE — 99999 PR PBB SHADOW E&M-EST. PATIENT-LVL IV: ICD-10-PCS | Mod: PBBFAC,HCNC,BMT, | Performed by: SURGERY

## 2020-08-07 PROCEDURE — 3077F SYST BP >= 140 MM HG: CPT | Mod: HCNC,BMT,CPTII,S$GLB | Performed by: SURGERY

## 2020-08-07 PROCEDURE — 99999 PR PBB SHADOW E&M-EST. PATIENT-LVL IV: CPT | Mod: PBBFAC,HCNC,BMT, | Performed by: SURGERY

## 2020-08-07 PROCEDURE — 1126F AMNT PAIN NOTED NONE PRSNT: CPT | Mod: HCNC,BMT,S$GLB, | Performed by: SURGERY

## 2020-08-07 NOTE — LETTER
August 10, 2020      Jere Tineo MD  [address]  [facility] [address]           [facility] - Vascular Surgery  [address]  [facility] [address]  Phone: [phone]  Fax: [phone]          Patient: Shelby Thompson   MR Number: [id number]   YOB: 1950   Date of Visit: 8/7/2020       Dear Dr. Jere Tineo:    Thank you for referring Shelby Thompson to me for evaluation. Attached you will find relevant portions of my assessment and plan of care.    If you have questions, please do not hesitate to call me. I look forward to following Shelby Thompson along with you.    Sincerely,    YEYO Gil II, MD    Enclosure  CC:  No Recipients    If you would like to receive this communication electronically, please contact [email or web address] or [phone] to request more information on [employer] access.    For providers and/or their staff who would like to refer a patient to [facility], please contact us through our one-stop-shop provider referral line, [facility], at [phone].    If you feel you have received this communication in error or would no longer like to receive these types of communications, please e-mail [email or web address]

## 2020-08-10 NOTE — PROGRESS NOTES
VASCULAR SURGERY NOTE    Patient ID: Shelby Thompson is a 70 y.o. female.    I. HISTORY     Chief Complaint: thoracic aneurysm    HPI: Shelby Thompson is a 70 y.o. female who is here today for new patient initial appointment.  She was referred by Dr. Tineo for evaluation of a descending thoracic aortic aneurysm seen on chest CT.  CT chest was ordered because she had complained of persistent shortness of breath.  She is still having some shortness of breath whenever she has to speak for long periods of time or has to walk around.  She comes to the office today in a wheelchair.  She did not previously known about her thoracic aortic aneurysm.  She denies any history of aneurysm disease in her family.  She does state that she had a cerebral aneurysm years ago and had this coiled.  She had a stroke associated with the aneurysm but eventually recovered.  She used to smoke cigarettes but quit nearly 10 years ago.    Family history: Reviewed. No h/o aneurysm      Past Medical History:   Diagnosis Date    Acute respiratory failure with hypoxia 4/30/2019    FUENTES (acute kidney injury) 5/1/2019    Allergy     Anemia     Aortic aneurysm     Breast cancer 10/2011    left breast Stage 0 DCIS    Chronic diastolic congestive heart failure 11/6/2015    Colon polyp     GERD (gastroesophageal reflux disease)     Hiatal hernia     History of colonic polyps     Hx of psychiatric care     Zoloft    HX: breast cancer     Hyperlipemia     Hypertension     ICH (intracerebral hemorrhage)     Major depressive disorder, single episode, mild 6/23/2016    Nuclear sclerosis 7/21/2014    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 7/21/2014    PAD (peripheral artery disease) 11/6/2015    Psychiatric problem     Statin-induced rhabdomyolysis     4/2015     Stroke 4/2011    Type 2 diabetes mellitus without complication, without long-term current use of insulin 2/10/2020    Walker as  ambulation aid         Past Surgical History:   Procedure Laterality Date    APPENDECTOMY      BONE MARROW BIOPSY Left 10/3/2019    Procedure: Biopsy-bone marrow;  Surgeon: Jere Tineo MD;  Location: [facility] OR [address] [place] [address];  Service: Oncology;  Laterality: Left;    BREAST BIOPSY Left 10/2011    BREAST LUMPECTOMY Left     DCIS    COLONOSCOPY      COLONOSCOPY N/A 2020    Procedure: COLONOSCOPY;  Surgeon: Quang De La Cruz MD;  Location: [facility] (4TH FLR);  Service: Endoscopy;  Laterality: N/A;    CYST REMOVAL      back    ESOPHAGOGASTRODUODENOSCOPY  2016    duodenal angioectasia    ESOPHAGOGASTRODUODENOSCOPY N/A 2020    Procedure: EGD (ESOPHAGOGASTRODUODENOSCOPY);  Surgeon: Quang De La Cruz MD;  Location: [facility] ENDO (4TH FLR);  Service: Endoscopy;  Laterality: N/A;    FOOT FRACTURE SURGERY  10/2012    right foot, with R hallux valgus repair    FRACTURE SURGERY Right     broken toe repiar    HEMORRHOID SURGERY      LIVER BIOPSY  2015    essentially normal, no fibrosis    TUBAL LIGATION      UPPER GASTROINTESTINAL ENDOSCOPY         Social History     Tobacco Use   Smoking Status Former Smoker    Packs/day: 0.50    Years: 20.00    Pack years: 10.00    Types: Cigarettes    Quit date: 2011    Years since quittin.3   Smokeless Tobacco Never Used        Review of Systems   Constitution: Positive for chills. Negative for fever.   HENT: Positive for ear discharge. Negative for ear pain and hoarse voice.    Eyes: Negative for discharge and pain.   Cardiovascular: Positive for palpitations. Negative for chest pain.   Respiratory: Positive for shortness of breath. Negative for cough and hemoptysis.    Endocrine: Negative for cold intolerance, heat intolerance and polyuria.   Skin: Negative for dry skin and rash.   Musculoskeletal: Negative for back pain and neck pain.   Gastrointestinal: Positive for diarrhea. Negative for abdominal pain, nausea and vomiting.   Genitourinary:  Negative for dysuria and hematuria.   Neurological: Negative for dizziness and paresthesias.         II. PHYSICAL EXAM     Physical Exam   GEN: Alert/oriented, normal affect, slow speech  HEENT: atraumatic, normocephalic, moist mucous membranes  CHEST: normal respiratory effort, symmetrical chest rise  CARD: Regular rate, regular rhythm  EXT: Warm, no cyanosis, pitting edema bilateral lower extremities down to the feet  SKIN:  No wounds, no rash, no venous stasis pigmentation  VASC:    RLE: Triphasic DP/PT signals  LLE: Triphasic DP/PT signals    III. ASSESSMENT & PLAN (MEDICAL DECISION MAKING)     1. Thoracic aortic aneurysm without rupture        Imaging Results:  CT a chest 08/03/2020:   4.5 cm distal descending thoracic aortic aneurysm at the level of the diaphragm.  Increased in size since last measured at 4.1cm on 11/01/2019.    Assessment/Diagnosis and Plan:  70 y.o. female with growing distal descending thoracic aortic aneurysm.  The aneurysm has grown steadily in size as expected.  At its current size would not recommend any intervention as risk of rupture remains low.  Recommend surveillance CT chest every 12 months to monitor growth of the aneurysm.  Would typically recommend aspirin 81 mg daily in patients with aortic aneurysm; however, the patient has multiple myeloma and is on maintenance chemotherapy so will defer this decision to her primary care and/or oncologist.    -RTC for CT chest in 1 yr  -discuss ASA 81 mg with PCP    YEYO Gil II, MD, [facility]  Vascular Surgeon  [facility]

## 2020-08-17 ENCOUNTER — INFUSION (OUTPATIENT)
Dept: INFUSION THERAPY | Facility: HOSPITAL | Age: 70
End: 2020-08-17
Payer: MEDICARE

## 2020-08-17 ENCOUNTER — EXTERNAL HOME HEALTH (OUTPATIENT)
Dept: HOME HEALTH SERVICES | Facility: HOSPITAL | Age: 70
End: 2020-08-17
Payer: MEDICARE

## 2020-08-17 DIAGNOSIS — C90.00 MULTIPLE MYELOMA NOT HAVING ACHIEVED REMISSION: ICD-10-CM

## 2020-08-17 DIAGNOSIS — C90.01 MULTIPLE MYELOMA IN REMISSION: Primary | ICD-10-CM

## 2020-08-17 PROCEDURE — 96401 CHEMO ANTI-NEOPL SQ/IM: CPT | Mod: HCNC

## 2020-08-17 PROCEDURE — 63600175 PHARM REV CODE 636 W HCPCS: Mod: JW,JG,HCNC | Performed by: INTERNAL MEDICINE

## 2020-08-17 RX ORDER — BORTEZOMIB 3.5 MG/1
1.3 INJECTION, POWDER, LYOPHILIZED, FOR SOLUTION INTRAVENOUS; SUBCUTANEOUS
Status: COMPLETED | OUTPATIENT
Start: 2020-08-17 | End: 2020-08-17

## 2020-08-17 RX ADMIN — BORTEZOMIB 2.3 MG: 3.5 INJECTION, POWDER, LYOPHILIZED, FOR SOLUTION INTRAVENOUS; SUBCUTANEOUS at 09:08

## 2020-08-20 ENCOUNTER — OFFICE VISIT (OUTPATIENT)
Dept: INTERNAL MEDICINE | Facility: CLINIC | Age: 70
End: 2020-08-20
Payer: MEDICARE

## 2020-08-20 VITALS
HEART RATE: 70 BPM | WEIGHT: 178 LBS | BODY MASS INDEX: 31.54 KG/M2 | DIASTOLIC BLOOD PRESSURE: 88 MMHG | HEIGHT: 63 IN | OXYGEN SATURATION: 95 % | SYSTOLIC BLOOD PRESSURE: 138 MMHG

## 2020-08-20 DIAGNOSIS — I49.3 PVC (PREMATURE VENTRICULAR CONTRACTION): ICD-10-CM

## 2020-08-20 DIAGNOSIS — Z85.3 PERSONAL HISTORY OF MALIGNANT NEOPLASM OF BREAST: ICD-10-CM

## 2020-08-20 DIAGNOSIS — I69.30 HISTORY OF CVA WITH RESIDUAL DEFICIT: ICD-10-CM

## 2020-08-20 DIAGNOSIS — G62.0 DRUG-INDUCED POLYNEUROPATHY: ICD-10-CM

## 2020-08-20 DIAGNOSIS — J84.9 INTERSTITIAL LUNG DISEASE: ICD-10-CM

## 2020-08-20 DIAGNOSIS — E78.00 PURE HYPERCHOLESTEROLEMIA: ICD-10-CM

## 2020-08-20 DIAGNOSIS — F33.42 RECURRENT MAJOR DEPRESSIVE DISORDER, IN FULL REMISSION: ICD-10-CM

## 2020-08-20 DIAGNOSIS — I77.819 ECTATIC AORTA: ICD-10-CM

## 2020-08-20 DIAGNOSIS — Z00.00 ENCOUNTER FOR PREVENTIVE HEALTH EXAMINATION: Primary | ICD-10-CM

## 2020-08-20 DIAGNOSIS — T45.1X5A PANCYTOPENIA DUE TO ANTINEOPLASTIC CHEMOTHERAPY: ICD-10-CM

## 2020-08-20 DIAGNOSIS — I10 ESSENTIAL HYPERTENSION: ICD-10-CM

## 2020-08-20 DIAGNOSIS — I69.351 HEMIPLEGIA AND HEMIPARESIS FOLLOWING CEREBRAL INFARCTION AFFECTING RIGHT DOMINANT SIDE: ICD-10-CM

## 2020-08-20 DIAGNOSIS — R26.9 ABNORMALITY OF GAIT AND MOBILITY: ICD-10-CM

## 2020-08-20 DIAGNOSIS — C90.00 MULTIPLE MYELOMA NOT HAVING ACHIEVED REMISSION: ICD-10-CM

## 2020-08-20 DIAGNOSIS — I70.0 AORTIC ATHEROSCLEROSIS: ICD-10-CM

## 2020-08-20 DIAGNOSIS — D63.0 ANEMIA IN NEOPLASTIC DISEASE: ICD-10-CM

## 2020-08-20 DIAGNOSIS — E11.9 TYPE 2 DIABETES MELLITUS WITHOUT COMPLICATION, WITHOUT LONG-TERM CURRENT USE OF INSULIN: ICD-10-CM

## 2020-08-20 DIAGNOSIS — M85.80 OSTEOPENIA, UNSPECIFIED LOCATION: ICD-10-CM

## 2020-08-20 DIAGNOSIS — D50.0 IRON DEFICIENCY ANEMIA DUE TO CHRONIC BLOOD LOSS: ICD-10-CM

## 2020-08-20 DIAGNOSIS — I82.0 BUDD-CHIARI SYNDROME: ICD-10-CM

## 2020-08-20 DIAGNOSIS — C90.00 METASTATIC MULTIPLE MYELOMA TO BONE: ICD-10-CM

## 2020-08-20 DIAGNOSIS — I77.1 TORTUOUS AORTA: ICD-10-CM

## 2020-08-20 DIAGNOSIS — I73.9 PAD (PERIPHERAL ARTERY DISEASE): ICD-10-CM

## 2020-08-20 DIAGNOSIS — Z94.81 STATUS POST AUTOLOGOUS BONE MARROW TRANSPLANT: ICD-10-CM

## 2020-08-20 DIAGNOSIS — D61.810 PANCYTOPENIA DUE TO ANTINEOPLASTIC CHEMOTHERAPY: ICD-10-CM

## 2020-08-20 DIAGNOSIS — I69.322 DYSARTHRIA AS LATE EFFECT OF CEREBROVASCULAR ACCIDENT (CVA): ICD-10-CM

## 2020-08-20 DIAGNOSIS — I27.9 PULMONARY HEART DISEASE: ICD-10-CM

## 2020-08-20 DIAGNOSIS — I71.20 THORACIC AORTIC ANEURYSM WITHOUT RUPTURE: ICD-10-CM

## 2020-08-20 PROBLEM — C90.01 MULTIPLE MYELOMA IN REMISSION: Status: RESOLVED | Noted: 2019-10-03 | Resolved: 2020-08-20

## 2020-08-20 PROCEDURE — G0439 PR MEDICARE ANNUAL WELLNESS SUBSEQUENT VISIT: ICD-10-PCS | Mod: HCNC,BMT,S$GLB, | Performed by: NURSE PRACTITIONER

## 2020-08-20 PROCEDURE — 3075F PR MOST RECENT SYSTOLIC BLOOD PRESS GE 130-139MM HG: ICD-10-PCS | Mod: HCNC,BMT,CPTII,S$GLB | Performed by: NURSE PRACTITIONER

## 2020-08-20 PROCEDURE — 99999 PR PBB SHADOW E&M-EST. PATIENT-LVL IV: ICD-10-PCS | Mod: PBBFAC,HCNC,BMT, | Performed by: NURSE PRACTITIONER

## 2020-08-20 PROCEDURE — 3079F DIAST BP 80-89 MM HG: CPT | Mod: HCNC,BMT,CPTII,S$GLB | Performed by: NURSE PRACTITIONER

## 2020-08-20 PROCEDURE — 99499 UNLISTED E&M SERVICE: CPT | Mod: HCNC,BMT,S$GLB, | Performed by: NURSE PRACTITIONER

## 2020-08-20 PROCEDURE — 3044F HG A1C LEVEL LT 7.0%: CPT | Mod: HCNC,BMT,CPTII,S$GLB | Performed by: NURSE PRACTITIONER

## 2020-08-20 PROCEDURE — G0439 PPPS, SUBSEQ VISIT: HCPCS | Mod: HCNC,BMT,S$GLB, | Performed by: NURSE PRACTITIONER

## 2020-08-20 PROCEDURE — 3079F PR MOST RECENT DIASTOLIC BLOOD PRESSURE 80-89 MM HG: ICD-10-PCS | Mod: HCNC,BMT,CPTII,S$GLB | Performed by: NURSE PRACTITIONER

## 2020-08-20 PROCEDURE — 99999 PR PBB SHADOW E&M-EST. PATIENT-LVL IV: CPT | Mod: PBBFAC,HCNC,BMT, | Performed by: NURSE PRACTITIONER

## 2020-08-20 PROCEDURE — 3044F PR MOST RECENT HEMOGLOBIN A1C LEVEL <7.0%: ICD-10-PCS | Mod: HCNC,BMT,CPTII,S$GLB | Performed by: NURSE PRACTITIONER

## 2020-08-20 PROCEDURE — 99499 RISK ADDL DX/OHS AUDIT: ICD-10-PCS | Mod: HCNC,BMT,S$GLB, | Performed by: NURSE PRACTITIONER

## 2020-08-20 PROCEDURE — 3075F SYST BP GE 130 - 139MM HG: CPT | Mod: HCNC,BMT,CPTII,S$GLB | Performed by: NURSE PRACTITIONER

## 2020-08-20 NOTE — PATIENT INSTRUCTIONS
Counseling and Referral of Other Preventative  (Italic type indicates deductible and co-insurance are waived)    Patient Name: Shelby Thompson  Today's Date: 8/20/2020    Health Maintenance       Date Due Completion Date    Urine Microalbumin 03/25/1959 ---    Shingles Vaccine (1 of 2) 03/25/1999 ---    Eye Exam 02/13/2018 2/13/2017    Mammogram 09/18/2020 9/18/2019    Influenza Vaccine (1) 09/01/2020 10/10/2019    Hemoglobin A1c 12/19/2020 6/19/2020    DEXA SCAN 01/11/2021 1/11/2017    Foot Exam 06/04/2021 6/4/2020 (Done)    Override on 6/4/2020: Done    Lipid Panel 06/19/2021 6/19/2020    Colorectal Cancer Screening 01/09/2025 1/9/2020    TETANUS VACCINE 06/23/2026 6/23/2016    Override on 6/23/2016: Done        No orders of the defined types were placed in this encounter.    The following information is provided to all patients.  This information is to help you find resources for any of the problems found today that may be affecting your health:                Living healthy guide: www.[facility].[place].gov      Understanding Diabetes: www.diabetes.org      Eating healthy: www.cdc.gov/healthyweight      CDC home safety checklist: www.cdc.gov/steadi/patient.html      Agency on Aging: www.goea.[place].[facility]      Alcoholics anonymous (AA): www.aa.org      Physical Activity: www.joseline.nih.gov/ab7lsyj      Tobacco use: www.quitwithusla.org

## 2020-08-20 NOTE — PROGRESS NOTES
"  Shelby Thompson presented for a  Medicare AWV and comprehensive Health Risk Assessment today. The following components were reviewed and updated:    · Medical history  · Family History  · Social history  · Allergies and Current Medications  · Health Risk Assessment  · Health Maintenance  · Care Team     ** See Completed Assessments for Annual Wellness Visit within the encounter summary.**         The following assessments were completed:  · Living Situation  · CAGE  · Depression Screening  · Timed Get Up and Go  · Whisper Test  · Cognitive Function Screening*post CVA  · Nutrition Screening  · ADL Screening  · PAQ Screening        Vitals:    08/20/20 0926   BP: 138/88   Pulse: 70   SpO2: 95%   Weight: 80.7 kg (178 lb 0.3 oz)   Height: 5' 3" (1.6 m)     Body mass index is 31.54 kg/m².  Physical Exam  Vitals signs and nursing note reviewed.   Constitutional:       General: She is not in acute distress.     Appearance: She is well-developed. She is not ill-appearing, toxic-appearing or diaphoretic.   HENT:      Head: Normocephalic and atraumatic.      Nose: Nose normal.   Eyes:      Conjunctiva/sclera: Conjunctivae normal.   Cardiovascular:      Rate and Rhythm: Normal rate and regular rhythm.      Pulses: Normal pulses.      Heart sounds: Normal heart sounds. No murmur.   Pulmonary:      Effort: Pulmonary effort is normal. No respiratory distress.      Breath sounds: Normal breath sounds. No wheezing.   Musculoskeletal:      Comments: Wheelchair   Skin:     General: Skin is warm and dry.   Neurological:      Mental Status: She is alert and oriented to person, place, and time.      Gait: Gait abnormal.   Psychiatric:         Behavior: Behavior normal.         Thought Content: Thought content normal.         Judgment: Judgment normal.               Diagnoses and health risks identified today and associated recommendations/orders:    1. Encounter for preventive health examination  Assessment performed. Health " maintenance updated. Chart review completed.    2. Abnormality of gait and mobility  Noted. Post CVA  Last seen by Neurology 2015.  Walker use.    3. Recurrent major depressive disorder, in full remission  Chronic. Stable. Endorses good mood lately. Friend and  is involved in her cares.  Followed by PCP    4. Interstitial lung disease  Chronic. Continue current regimen as directed. Followed by Pulmonology    5. Pulmonary heart disease  Chronic. Continue current regimen as directed. Followed by Pulmonology    6. Aortic atherosclerosis  Noted on imaging. Chronic. Continue current regimen as instructed. Followed by PCP    7. Ectatic aorta  Noted on imaging. Chronic. Continue current regimen as instructed. Followed by PCP    8. PAD (peripheral artery disease)  Chronic. Stable on imaging. Followed by Cardiology    9. Tortuous aorta  Noted on imaging. Chronic. Continue current regimen as instructed. Followed by PCP    10. Thoracic aortic aneurysm without rupture  Chronic. Stable on imaging. Followed by Vascular Surgery    11. PVC (premature ventricular contraction)  Chronic. Stable on imaging. 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 Continue as instructed. Followed by Hepatology    13. Metastatic multiple myeloma to bone  Chronic. Continue regimen as instructed. Followed by Hematology Oncology    14. Iron deficiency anemia due to chronic blood loss  Chronic. Continue regimen as instructed. Followed by Hematology Oncology    15. Anemia in neoplastic disease  Chronic. Continue regimen as instructed. Followed by Hematology Oncology    16. Essential hypertension  Noted on imaging. Chronic. Continue current regimen as instructed. Followed by PCP    17. Pure hypercholesterolemia  Noted on imaging. Chronic. Continue current regimen as instructed. Followed by PCP    18. Hemiplegia and hemiparesis following cerebral infarction affecting right dominant side  Noted. Post CVA  Last seen by Neurology  2015.  Walker use    19. History of CVA with residual deficit  Noted. Post CVA  Last seen by Neurology 2015.  Walker use.    20. Drug-induced polyneuropathy  Chronic. Continue regimen as instructed. Followed by Hematology Oncology    21. 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 Post CVA  Last seen by Neurology 2015.    22. Multiple myeloma not having achieved remission  Chronic. Continue regimen as instructed. Followed by Hematology Oncology    23. Pancytopenia due to antineoplastic chemotherapy  Chronic. Continue regimen as instructed. Followed by Hematology Oncology    24. Type 2 diabetes mellitus without complication, without long-term current use of insulin  Chronic. Continue current regimen as instructed. Followed by PCP  Component      Latest Ref Rng & Units 6/19/2020   Hemoglobin A1C External      4.0 - 5.6 % 6.0 (H)   Estimated Avg Glucose      68 - 131 mg/dL 126     25. Osteopenia, unspecified location  Chronic. Continue current regimen as instructed. Followed by PCP    26. Status post autologous bone marrow transplant  Chronic. Continue regimen as instructed. Followed by Hematology Oncology    27. Personal history of malignant neoplasm of breast  Chronic. Continue regimen as instructed. Followed by Hematology Oncology      Provided Shelby with a 5-10 year written screening schedule and personal prevention plan. Recommendations were developed using the USPSTF age appropriate recommendations. Education, counseling, and referrals were provided as needed. After Visit Summary printed and given to patient which includes a list of additional screenings\tests needed.    Follow up for Annual Wellness Visit in 1 year, F/U with PCP as instructed, ;sooner if problems.    GALA Rodgers       I offered to discuss end of life issues, including information on how to make advance directives that the patient could use to name someone who would make medical decisions on their behalf if they  became too ill to make themselves.    ___Patient declined  _X_Patient is interested, I provided paper work and offered to discuss.

## 2020-08-21 DIAGNOSIS — E11.9 TYPE 2 DIABETES MELLITUS WITHOUT COMPLICATION: ICD-10-CM

## 2020-08-25 ENCOUNTER — TELEPHONE (OUTPATIENT)
Dept: PHARMACY | Facility: CLINIC | Age: 70
End: 2020-08-25

## 2020-08-31 ENCOUNTER — INFUSION (OUTPATIENT)
Dept: INFUSION THERAPY | Facility: HOSPITAL | Age: 70
End: 2020-08-31
Payer: MEDICARE

## 2020-08-31 VITALS
SYSTOLIC BLOOD PRESSURE: 138 MMHG | HEIGHT: 63 IN | BODY MASS INDEX: 32.15 KG/M2 | TEMPERATURE: 99 F | WEIGHT: 181.44 LBS | RESPIRATION RATE: 18 BRPM | DIASTOLIC BLOOD PRESSURE: 75 MMHG | HEART RATE: 85 BPM

## 2020-08-31 DIAGNOSIS — C90.01 MULTIPLE MYELOMA IN REMISSION: Primary | ICD-10-CM

## 2020-08-31 DIAGNOSIS — C90.00 MULTIPLE MYELOMA NOT HAVING ACHIEVED REMISSION: ICD-10-CM

## 2020-08-31 PROCEDURE — 63600175 PHARM REV CODE 636 W HCPCS: Mod: JG,HCNC | Performed by: INTERNAL MEDICINE

## 2020-08-31 PROCEDURE — 96401 CHEMO ANTI-NEOPL SQ/IM: CPT | Mod: HCNC

## 2020-08-31 RX ORDER — BORTEZOMIB 3.5 MG/1
1.3 INJECTION, POWDER, LYOPHILIZED, FOR SOLUTION INTRAVENOUS; SUBCUTANEOUS
Status: COMPLETED | OUTPATIENT
Start: 2020-08-31 | End: 2020-08-31

## 2020-08-31 RX ADMIN — BORTEZOMIB 2.3 MG: 3.5 INJECTION, POWDER, LYOPHILIZED, FOR SOLUTION INTRAVENOUS; SUBCUTANEOUS at 09:08

## 2020-08-31 NOTE — NURSING
Tolerated Velcade injection to abdomen with no complications. VSS. NAD. Discharged home independently.

## 2020-09-11 DIAGNOSIS — K76.9 LIVER DISEASE, UNSPECIFIED: ICD-10-CM

## 2020-09-12 NOTE — PROGRESS NOTES
PATIENT: Shelby Thompson  MRN: [id number]  DATE: 9/14/2020    Diagnosis:   1. Multiple myeloma in remission    2. History of auto stem cell transplant    3. Dyspnea, unspecified type    4. Descending aortic aneurysm    5. Essential hypertension    6. Drug-induced polyneuropathy    7. Gastroesophageal reflux disease without esophagitis    8. Recurrent major depressive disorder, in full remission    9. Type 2 diabetes mellitus without complication, without long-term current use of insulin    10. History of CVA with residual deficit    11. Statin intolerance    12. Pure hypercholesterolemia    13. Personal history of malignant neoplasm of breast        Chief Complaint: Multiple myeloma in remission    Oncologic History:      Multiple Myeloma- presented w CRAB criteria (Hgb 7.1, hypercalcemia, renal function - Cr of 2.7, T7 vertebral fracture)  IgG Kappa, RISS Stage III (beta-2 micro globulin of 17.5 and 14:20 translocation) High Risk disease  She   achieved and tolerated well VRd x completing 7, 28 day cycles and achieving deep VGPR to induction.       Extensive PMH as below.    2 prior CVAs (one in 2008, one in 2011)  L breast cancer DCIS stage 0 (s/p lumpectomy and radiation, no endocrine tx due to CVA)  HTN  DM II  Neuropathy, b/l hands  Prior smoker, quit in 2011  Hepatic lesions - vascular per recent MRI in 09 /2019 with no changes; will confirm no further rascon needed from hep  Statin Intolerance - on praluent, PCSK9 inhibitor   HFpEF - EF 55%, diastolic dysfunction  Anxiety/Depression     ADMISSION SUMMARY: 2/10-2/26/2020  Admitted 2/10, tolerated chemo and transplant well. Engrafted on day +12. Remained afebrile throughout hospital stay and no mucositis. Experienced expected side effects of nausea and diarrhea controlled with antiemetics and Imodium. Discharged home with home PT/OT    Subjective:       Initial History: Ms. Thompson is a 70 y.o. female who returns for follow up to multiple myeloma.  She is Day +215   from a [employer] SCT. She had her Day +100 BMBx on 5/6/20, which showed no residual disease. Maintenance was initiated 5/6/20. She is tolerating maintenance Velcaid well, has stable b/l LE neuropathy. Also notes L calf pain which she feels is chronic from chemo; she gets intermittent leg cramps, showed her stretching exercises in visit today that she can do to help prevent/reduce intensity of cramping. No noted BLE edema today on exam. She was diagnosed with a descending aortic aneursym back in August after complaining of worsening SOB. Her symptoms remain stable. She follows with thoracic surgery for this. They recommended she start ppx baby aspirin, will discuss this with Dr. Tineo. Otherwise, denies fevers, chills, chest pain, n/v/d/c. Still reports intermittent short term memory.    Past Medical History:   Past Medical History:   Diagnosis Date    Acute respiratory failure with hypoxia 4/30/2019    FUENTES (acute kidney injury) 5/1/2019    Allergy     Anemia     Aortic aneurysm     Breast cancer 10/2011    left breast Stage 0 DCIS    Chronic diastolic congestive heart failure 11/6/2015    Colon polyp     GERD (gastroesophageal reflux disease)     Hiatal hernia     History of colonic polyps     Hx of psychiatric care     Zoloft    HX: breast cancer     Hyperlipemia     Hypertension     ICH (intracerebral hemorrhage)     Major depressive disorder, single episode, mild 6/23/2016    Nuclear sclerosis 7/21/2014    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 7/21/2014    PAD (peripheral artery disease) 11/6/2015    Psychiatric problem     Statin-induced rhabdomyolysis     4/2015     Stroke 4/2011    Type 2 diabetes mellitus without complication, without long-term current use of insulin 2/10/2020    Walker as ambulation aid        Past Surgical HIstory:   Past Surgical History:   Procedure Laterality Date    APPENDECTOMY      BONE MARROW BIOPSY Left 10/3/2019     Procedure: Biopsy-bone marrow;  Surgeon: Jere Tineo MD;  [facility];  Service: Oncology;  Laterality: Left;    BREAST BIOPSY Left 10/2011    BREAST LUMPECTOMY Left 2011    DCIS    COLONOSCOPY      COLONOSCOPY N/A 1/9/2020    Procedure: COLONOSCOPY;  Surgeon: Quang De La Cruz MD;  Location: [facility] ENDO (4TH FLR);  Service: Endoscopy;  Laterality: N/A;    CYST REMOVAL      back    ESOPHAGOGASTRODUODENOSCOPY  07/2016    duodenal angioectasia    ESOPHAGOGASTRODUODENOSCOPY N/A 1/9/2020    Procedure: EGD (ESOPHAGOGASTRODUODENOSCOPY);  Surgeon: Quang De La Cruz MD;  Location: [facility] ENDO (4TH FLR);  Service: Endoscopy;  Laterality: N/A;    FOOT FRACTURE SURGERY  10/2012    right foot, with R hallux valgus repair    FRACTURE SURGERY Right     broken toe repiar    HEMORRHOID SURGERY      LIVER BIOPSY  04/2015    essentially normal, no fibrosis    TUBAL LIGATION      UPPER GASTROINTESTINAL ENDOSCOPY         Family History:   Family History   Problem Relation Age of Onset    No Known Problems Sister     Cancer Sister 63        Lung Cancer    No Known Problems Mother     Cancer Father     No Known Problems Brother     Hypertension Daughter     Fibroids Daughter         uterine    Hypertension Son     Breast cancer Sister 62    No Known Problems Brother     No Known Problems Maternal Aunt     No Known Problems Maternal Uncle     No Known Problems Paternal Aunt     No Known Problems Paternal Uncle     Hypertension Maternal Grandmother     No Known Problems Maternal Grandfather     No Known Problems Paternal Grandmother     No Known Problems Paternal Grandfather     Ovarian cancer Neg Hx     Colon cancer Neg Hx     Tremor Neg Hx     Amblyopia Neg Hx     Blindness Neg Hx     Cataracts Neg Hx     Diabetes Neg Hx     Glaucoma Neg Hx     Macular degeneration Neg Hx     Retinal detachment Neg Hx     Strabismus Neg Hx     Stroke Neg Hx     Thyroid disease Neg Hx      Esophageal cancer Neg Hx     Rectal cancer Neg Hx     Stomach cancer Neg Hx     Ulcerative colitis Neg Hx     Crohn's disease Neg Hx     Irritable bowel syndrome Neg Hx     Celiac disease Neg Hx        Social History:  reports that she quit smoking about 9 years ago. Her smoking use included cigarettes. She has a 10.00 pack-year smoking history. She has never used smokeless tobacco. She reports previous alcohol use. She reports that she does not use drugs.    Allergies:  Review of patient's allergies indicates:   Allergen Reactions    Lipitor [atorvastatin]      Itching, elevated cpk, muscle aches, statin induced myositis       Medications:  Current Outpatient Medications   Medication Sig Dispense Refill    acyclovir (ZOVIRAX) 800 MG Tab Take 1 tablet (800 mg total) by mouth 2 (two) times daily. 60 tablet 11    alirocumab (PRALUENT PEN) 75 mg/mL PnIj Inject 1 mL (75 mg total) into the skin every 14 (fourteen) days. 6 Syringe 3    amLODIPine (NORVASC) 5 MG tablet Take 1 tablet (5 mg total) by mouth once daily. 90 tablet 3    calcium-vitamin D3 (OS- + D3) 500 mg(1,250mg) -200 unit per tablet Take 1 tablet by mouth 2 (two) times daily. 60 tablet 0    ferrous gluconate 324 mg (37.5 mg iron) Tab TAKE 1 TABLET BY MOUTH 2 (TWO) TIMES DAILY WITH MEALS. 180 tablet 0    gabapentin (NEURONTIN) 300 MG capsule Take 1 capsule (300mg) by mouth every morning and 2 capsules (600mg) every evening. 90 capsule 5    loratadine (CLARITIN) 10 mg tablet Take 10 mg by mouth nightly.      magnesium oxide (MAG-OX) 400 mg (241.3 mg magnesium) tablet Take 1 tablet (400 mg total) by mouth 2 (two) times daily. 60 tablet 2    omeprazole (PRILOSEC) 40 MG capsule Take 1 capsule (40 mg total) by mouth once daily. 90 capsule 3    ondansetron (ZOFRAN) 4 MG tablet Take 1 tablet (4 mg total) by mouth every 6 (six) hours as needed for Nausea. 30 tablet 1    potassium chloride SA (K-DUR,KLOR-CON) 20 MEQ tablet Take 1 tablet (20 mEq  "total) by mouth once daily. 30 tablet 11    sertraline (ZOLOFT) 50 MG tablet Take 1 tablet (50 mg total) by mouth once daily. 90 tablet 3    traMADoL (ULTRAM) 50 mg tablet Take 1 tablet (50 mg total) by mouth 2 (two) times daily as needed for Pain. 60 tablet 3     No current facility-administered medications for this visit.        Review of Systems   Constitutional: Negative.  Negative for appetite change, chills, fatigue and fever.   HENT: Positive for ear discharge (clear) and postnasal drip. Negative for ear pain, nosebleeds, rhinorrhea and sore throat.    Eyes: Negative for pain.   Respiratory: Positive for shortness of breath. Negative for cough and chest tightness.    Cardiovascular: Negative for chest pain, palpitations and leg swelling.   Gastrointestinal: Positive for diarrhea and nausea (intermittent). Negative for abdominal distention, abdominal pain, blood in stool, constipation and vomiting.   Genitourinary: Negative.  Negative for dysuria and hematuria.   Musculoskeletal: Negative.  Negative for back pain, gait problem and myalgias.   Skin: Negative.    Neurological: Positive for numbness. Negative for syncope and headaches.        Memory deficits  Peripheral neuropathy   Hematological: Negative for adenopathy. Does not bruise/bleed easily.   Psychiatric/Behavioral: Negative.  The patient is not nervous/anxious.        ECOG Performance Status: 0   Objective:      Vitals:   Vitals:    09/14/20 0931   BP: 127/68   BP Location: Left arm   Patient Position: Sitting   BP Method: Large (Automatic)   Pulse: 64   Resp: 16   Temp: 98.4 °F (36.9 °C)   TempSrc: Oral   SpO2: (!) 94%   Weight: 82.9 kg (182 lb 10.4 oz)   Height: 5' 3" (1.6 m)        Physical Exam  Vitals signs reviewed.   Constitutional:       Appearance: Normal appearance.   HENT:      Head: Normocephalic.      Right Ear: Tympanic membrane, ear canal and external ear normal.      Left Ear: Tympanic membrane, ear canal and external ear normal.     "  Nose: Nose normal.      Mouth/Throat:      Mouth: Mucous membranes are dry.      Pharynx: No posterior oropharyngeal erythema.   Eyes:      Extraocular Movements: Extraocular movements intact.      Conjunctiva/sclera: Conjunctivae normal.   Neck:      Musculoskeletal: Normal range of motion.   Cardiovascular:      Rate and Rhythm: Normal rate and regular rhythm.      Pulses: Normal pulses.      Heart sounds: Normal heart sounds.      Comments: Conversational dyspnea  Pulmonary:      Effort: Pulmonary effort is normal.      Breath sounds: Normal breath sounds.   Abdominal:      General: Bowel sounds are normal. There is no distension.      Palpations: Abdomen is soft. There is no mass.      Tenderness: There is no abdominal tenderness.   Musculoskeletal: Normal range of motion.         General: No swelling.      Right lower leg: No edema.      Left lower leg: No edema.   Skin:     General: Skin is warm and dry.      Findings: No erythema or rash.      Comments: Darkening b/l LE skin   Neurological:      General: No focal deficit present.      Mental Status: She is alert and oriented to person, place, and time.   Psychiatric:         Mood and Affect: Mood normal.         Behavior: Behavior normal.         Thought Content: Thought content normal.         Judgment: Judgment normal.         Laboratory Data:  Lab Visit on 09/14/2020   Component Date Value Ref Range Status    WBC 09/14/2020 3.83* 3.90 - 12.70 K/uL Final    RBC 09/14/2020 4.33  4.00 - 5.40 M/uL Final    Hemoglobin 09/14/2020 11.6* 12.0 - 16.0 g/dL Final    Hematocrit 09/14/2020 38.1  37.0 - 48.5 % Final    Mean Corpuscular Volume 09/14/2020 88  82 - 98 fL Final    Mean Corpuscular Hemoglobin 09/14/2020 26.8* 27.0 - 31.0 pg Final    Mean Corpuscular Hemoglobin Conc 09/14/2020 30.4* 32.0 - 36.0 g/dL Final    RDW 09/14/2020 17.2* 11.5 - 14.5 % Final    Platelets 09/14/2020 161  150 - 350 K/uL Final    MPV 09/14/2020 11.9  9.2 - 12.9 fL Final     Immature Granulocytes 09/14/2020 0.3  0.0 - 0.5 % Final    Gran # (ANC) 09/14/2020 2.6  1.8 - 7.7 K/uL Final    Immature Grans (Abs) 09/14/2020 0.01  0.00 - 0.04 K/uL Final    Comment: Mild elevation in immature granulocytes is non specific and   can be seen in a variety of conditions including stress response,   acute inflammation, trauma and pregnancy. Correlation with other   laboratory and clinical findings is essential.      Lymph # 09/14/2020 0.6* 1.0 - 4.8 K/uL Final    Mono # 09/14/2020 0.5  0.3 - 1.0 K/uL Final    Eos # 09/14/2020 0.1  0.0 - 0.5 K/uL Final    Baso # 09/14/2020 0.02  0.00 - 0.20 K/uL Final    nRBC 09/14/2020 0  0 /100 WBC Final    Gran% 09/14/2020 68.7  38.0 - 73.0 % Final    Lymph% 09/14/2020 15.7* 18.0 - 48.0 % Final    Mono% 09/14/2020 12.5  4.0 - 15.0 % Final    Eosinophil% 09/14/2020 2.3  0.0 - 8.0 % Final    Basophil% 09/14/2020 0.5  0.0 - 1.9 % Final    Differential Method 09/14/2020 Automated   Final    Sodium 09/14/2020 144  136 - 145 mmol/L Final    Potassium 09/14/2020 3.7  3.5 - 5.1 mmol/L Final    Chloride 09/14/2020 107  95 - 110 mmol/L Final    CO2 09/14/2020 26  23 - 29 mmol/L Final    Glucose 09/14/2020 115* 70 - 110 mg/dL Final    BUN, Bld 09/14/2020 11  8 - 23 mg/dL Final    Creatinine 09/14/2020 1.0  0.5 - 1.4 mg/dL Final    Calcium 09/14/2020 8.8  8.7 - 10.5 mg/dL Final    Total Protein 09/14/2020 7.2  6.0 - 8.4 g/dL Final    Albumin 09/14/2020 3.7  3.5 - 5.2 g/dL Final    Total Bilirubin 09/14/2020 0.6  0.1 - 1.0 mg/dL Final    Comment: For infants and newborns, interpretation of results should be based  on gestational age, weight and in agreement with clinical  observations.  Premature Infant recommended reference ranges:  Up to 24 hours.............<8.0 mg/dL  Up to 48 hours............<12.0 mg/dL  3-5 days..................<15.0 mg/dL  6-29 days.................<15.0 mg/dL      Alkaline Phosphatase 09/14/2020 65  55 - 135 U/L Final    AST  09/14/2020 24  10 - 40 U/L Final    ALT 09/14/2020 21  10 - 44 U/L Final    Anion Gap 09/14/2020 11  8 - 16 mmol/L Final    eGFR if African American 09/14/2020 >60.0  >60 mL/min/1.73 m^2 Final    eGFR if non African American 09/14/2020 56.8* >60 mL/min/1.73 m^2 Final    Comment: Calculation used to obtain the estimated glomerular filtration  rate (eGFR) is the CKD-EPI equation.       Protein, Serum 09/14/2020 7.0  6.0 - 8.4 g/dL Final    Comment: Serum protein electrophoresis and immunofixation results should be   interpreted in clinical context in that some therapeutic agents can   result   in false positive results (example, daratumumab). Correlation with   the   patient s therapeutic regimen is required.      IgG - Serum 09/14/2020 1174  650 - 1600 mg/dL Final    IgG Cord Blood Reference Range: 650-1600 mg/dL.    IgA 09/14/2020 66  40 - 350 mg/dL Final    IgA Cord Blood Reference Range: <5 mg/dL.         Imaging:      Assessment:       1. Multiple myeloma in remission    2. History of auto stem cell transplant    3. Dyspnea, unspecified type    4. Descending aortic aneurysm    5. Essential hypertension    6. Drug-induced polyneuropathy    7. Gastroesophageal reflux disease without esophagitis    8. Recurrent major depressive disorder, in full remission    9. Type 2 diabetes mellitus without complication, without long-term current use of insulin    10. History of CVA with residual deficit    11. Statin intolerance    12. Pure hypercholesterolemia    13. Personal history of malignant neoplasm of breast           Plan:     MM s/p Auto SCT  - high risk MM with 17p deletion  - Day +215 s/p Auto SCT  - started maintenance q2w velcade 5/6/20  - biochemical studies from last visit (8/3/20) c/w with stable disease; today's pending  - s/p round 1 of post transplant immunizations  - cycle 10 Velcaid today  - continue ppx acyclovir    SOB/descending aortic aneurysm  - CTA and dopplers ordered urgently with high  "concern for DVT/PE; completed 8/3/20  - incidental descending thoracic aortic aneurysm noted; referred to thoracic surgery; seen 8/7/20; per note: "at current size would not recommend any intervention as risk of rupture remains low.  Recommend surveillance CT chest every 12 months to monitor growth of the aneurysm.  Would typically recommend aspirin 81 mg daily in patients with aortic aneurysm  - no current evidence of CHF  - will start aspirin regimen today (9/14/20) per Dr. Tineo    HTN  - continue norvasc    Neuropathy  - continue gabapentin and prn tramadol    GERD  - continue prilosec    Anxiety/depression  - continue zoloft    DM2  - diet controlled    Hx of CVA  - s/p 2008, 2011    HLD with Statin Intolerance  -on praluent, PCSK9 inhibitor    L breast cancer DCIS stage 0  -s/p lumpectomy and radiation, no endocrine tx due to CVA    Follow up:  -schedule velcade injection 9/28, 10/12, 10/26  -cbc, cmp, serum free light chains, quantitative immunoglobulins, serum electropheresis, serum immunofixation, and MD appt prior to velcade injection on 10/26        Karina Rankin NP  Hematology/Oncology/BMT         "

## 2020-09-14 ENCOUNTER — INFUSION (OUTPATIENT)
Dept: INFUSION THERAPY | Facility: HOSPITAL | Age: 70
End: 2020-09-14
Attending: INTERNAL MEDICINE
Payer: MEDICARE

## 2020-09-14 ENCOUNTER — OFFICE VISIT (OUTPATIENT)
Dept: HEMATOLOGY/ONCOLOGY | Facility: CLINIC | Age: 70
End: 2020-09-14
Payer: MEDICARE

## 2020-09-14 VITALS
BODY MASS INDEX: 32.36 KG/M2 | HEART RATE: 64 BPM | DIASTOLIC BLOOD PRESSURE: 68 MMHG | SYSTOLIC BLOOD PRESSURE: 127 MMHG | TEMPERATURE: 98 F | HEIGHT: 63 IN | RESPIRATION RATE: 16 BRPM | WEIGHT: 182.63 LBS | OXYGEN SATURATION: 94 %

## 2020-09-14 DIAGNOSIS — G62.0 DRUG-INDUCED POLYNEUROPATHY: ICD-10-CM

## 2020-09-14 DIAGNOSIS — I10 ESSENTIAL HYPERTENSION: ICD-10-CM

## 2020-09-14 DIAGNOSIS — I71.9 DESCENDING AORTIC ANEURYSM: ICD-10-CM

## 2020-09-14 DIAGNOSIS — R93.2 ABNORMAL FINDINGS ON DIAGNOSTIC IMAGING OF LIVER AND BILIARY TRACT: ICD-10-CM

## 2020-09-14 DIAGNOSIS — Z94.84 HISTORY OF AUTO STEM CELL TRANSPLANT: ICD-10-CM

## 2020-09-14 DIAGNOSIS — C90.01 MULTIPLE MYELOMA IN REMISSION: Primary | ICD-10-CM

## 2020-09-14 DIAGNOSIS — F33.42 RECURRENT MAJOR DEPRESSIVE DISORDER, IN FULL REMISSION: ICD-10-CM

## 2020-09-14 DIAGNOSIS — I69.30 HISTORY OF CVA WITH RESIDUAL DEFICIT: ICD-10-CM

## 2020-09-14 DIAGNOSIS — Z78.9 STATIN INTOLERANCE: ICD-10-CM

## 2020-09-14 DIAGNOSIS — R06.00 DYSPNEA, UNSPECIFIED TYPE: ICD-10-CM

## 2020-09-14 DIAGNOSIS — Z85.3 PERSONAL HISTORY OF MALIGNANT NEOPLASM OF BREAST: ICD-10-CM

## 2020-09-14 DIAGNOSIS — C90.00 MULTIPLE MYELOMA NOT HAVING ACHIEVED REMISSION: ICD-10-CM

## 2020-09-14 DIAGNOSIS — K21.9 GASTROESOPHAGEAL REFLUX DISEASE WITHOUT ESOPHAGITIS: ICD-10-CM

## 2020-09-14 DIAGNOSIS — R16.0 LIVER MASSES: Primary | ICD-10-CM

## 2020-09-14 DIAGNOSIS — E11.9 TYPE 2 DIABETES MELLITUS WITHOUT COMPLICATION, WITHOUT LONG-TERM CURRENT USE OF INSULIN: ICD-10-CM

## 2020-09-14 DIAGNOSIS — E78.00 PURE HYPERCHOLESTEROLEMIA: ICD-10-CM

## 2020-09-14 PROCEDURE — 1101F PR PT FALLS ASSESS DOC 0-1 FALLS W/OUT INJ PAST YR: ICD-10-PCS | Mod: HCNC,BMT,CPTII,S$GLB | Performed by: NURSE PRACTITIONER

## 2020-09-14 PROCEDURE — 63600175 PHARM REV CODE 636 W HCPCS: Mod: JG,HCNC | Performed by: INTERNAL MEDICINE

## 2020-09-14 PROCEDURE — 3074F SYST BP LT 130 MM HG: CPT | Mod: HCNC,BMT,CPTII,S$GLB | Performed by: NURSE PRACTITIONER

## 2020-09-14 PROCEDURE — 99999 PR PBB SHADOW E&M-EST. PATIENT-LVL IV: ICD-10-PCS | Mod: PBBFAC,HCNC,BMT, | Performed by: NURSE PRACTITIONER

## 2020-09-14 PROCEDURE — 99999 PR PBB SHADOW E&M-EST. PATIENT-LVL IV: CPT | Mod: PBBFAC,HCNC,BMT, | Performed by: NURSE PRACTITIONER

## 2020-09-14 PROCEDURE — 3078F DIAST BP <80 MM HG: CPT | Mod: HCNC,BMT,CPTII,S$GLB | Performed by: NURSE PRACTITIONER

## 2020-09-14 PROCEDURE — 99499 RISK ADDL DX/OHS AUDIT: ICD-10-PCS | Mod: HCNC,BMT,S$GLB, | Performed by: NURSE PRACTITIONER

## 2020-09-14 PROCEDURE — 99215 OFFICE O/P EST HI 40 MIN: CPT | Mod: HCNC,BMT,S$GLB, | Performed by: NURSE PRACTITIONER

## 2020-09-14 PROCEDURE — 3008F BODY MASS INDEX DOCD: CPT | Mod: HCNC,BMT,CPTII,S$GLB | Performed by: NURSE PRACTITIONER

## 2020-09-14 PROCEDURE — 99215 PR OFFICE/OUTPT VISIT, EST, LEVL V, 40-54 MIN: ICD-10-PCS | Mod: HCNC,BMT,S$GLB, | Performed by: NURSE PRACTITIONER

## 2020-09-14 PROCEDURE — 96401 CHEMO ANTI-NEOPL SQ/IM: CPT | Mod: HCNC

## 2020-09-14 PROCEDURE — 3074F PR MOST RECENT SYSTOLIC BLOOD PRESSURE < 130 MM HG: ICD-10-PCS | Mod: HCNC,BMT,CPTII,S$GLB | Performed by: NURSE PRACTITIONER

## 2020-09-14 PROCEDURE — 1126F AMNT PAIN NOTED NONE PRSNT: CPT | Mod: HCNC,BMT,S$GLB, | Performed by: NURSE PRACTITIONER

## 2020-09-14 PROCEDURE — 1101F PT FALLS ASSESS-DOCD LE1/YR: CPT | Mod: HCNC,BMT,CPTII,S$GLB | Performed by: NURSE PRACTITIONER

## 2020-09-14 PROCEDURE — 3008F PR BODY MASS INDEX (BMI) DOCUMENTED: ICD-10-PCS | Mod: HCNC,BMT,CPTII,S$GLB | Performed by: NURSE PRACTITIONER

## 2020-09-14 PROCEDURE — 3078F PR MOST RECENT DIASTOLIC BLOOD PRESSURE < 80 MM HG: ICD-10-PCS | Mod: HCNC,BMT,CPTII,S$GLB | Performed by: NURSE PRACTITIONER

## 2020-09-14 PROCEDURE — 3044F HG A1C LEVEL LT 7.0%: CPT | Mod: HCNC,BMT,CPTII,S$GLB | Performed by: NURSE PRACTITIONER

## 2020-09-14 PROCEDURE — 1159F MED LIST DOCD IN RCRD: CPT | Mod: HCNC,BMT,S$GLB, | Performed by: NURSE PRACTITIONER

## 2020-09-14 PROCEDURE — 1126F PR PAIN SEVERITY QUANTIFIED, NO PAIN PRESENT: ICD-10-PCS | Mod: HCNC,BMT,S$GLB, | Performed by: NURSE PRACTITIONER

## 2020-09-14 PROCEDURE — 99499 UNLISTED E&M SERVICE: CPT | Mod: HCNC,BMT,S$GLB, | Performed by: NURSE PRACTITIONER

## 2020-09-14 PROCEDURE — 3044F PR MOST RECENT HEMOGLOBIN A1C LEVEL <7.0%: ICD-10-PCS | Mod: HCNC,BMT,CPTII,S$GLB | Performed by: NURSE PRACTITIONER

## 2020-09-14 PROCEDURE — 1159F PR MEDICATION LIST DOCUMENTED IN MEDICAL RECORD: ICD-10-PCS | Mod: HCNC,BMT,S$GLB, | Performed by: NURSE PRACTITIONER

## 2020-09-14 RX ORDER — BORTEZOMIB 3.5 MG/1
1.3 INJECTION, POWDER, LYOPHILIZED, FOR SOLUTION INTRAVENOUS; SUBCUTANEOUS
Status: COMPLETED | OUTPATIENT
Start: 2020-09-14 | End: 2020-09-14

## 2020-09-14 RX ORDER — BORTEZOMIB 3.5 MG/1
1.3 INJECTION, POWDER, LYOPHILIZED, FOR SOLUTION INTRAVENOUS; SUBCUTANEOUS
Status: CANCELLED | OUTPATIENT
Start: 2020-09-19

## 2020-09-14 RX ORDER — ASPIRIN 81 MG/1
81 TABLET ORAL DAILY
Qty: 90 TABLET | Refills: 3 | Status: SHIPPED | OUTPATIENT
Start: 2020-09-14 | End: 2020-12-14 | Stop reason: SDUPTHER

## 2020-09-14 RX ORDER — HEPARIN 100 UNIT/ML
500 SYRINGE INTRAVENOUS
Status: CANCELLED | OUTPATIENT
Start: 2020-09-19

## 2020-09-14 RX ORDER — SODIUM CHLORIDE 0.9 % (FLUSH) 0.9 %
10 SYRINGE (ML) INJECTION
Status: CANCELLED | OUTPATIENT
Start: 2020-09-19

## 2020-09-14 RX ADMIN — BORTEZOMIB 2.5 MG: 3.5 INJECTION, POWDER, LYOPHILIZED, FOR SOLUTION INTRAVENOUS; SUBCUTANEOUS at 11:09

## 2020-09-14 NOTE — Clinical Note
-schedule velcade injection 9/28, 10/12, 10/26  -cbc, cmp, serum free light chains, quantitative immunoglobulins, serum electropheresis, serum immunofixation, and MD appt prior to velcade injection on 10/26

## 2020-09-14 NOTE — NURSING
Pt arrived for Velcade.  Pt tolerated injection SQ to RLA.  Discharged to home in wheelchair with .

## 2020-09-23 ENCOUNTER — TELEPHONE (OUTPATIENT)
Dept: HEPATOLOGY | Facility: CLINIC | Age: 70
End: 2020-09-23

## 2020-09-23 NOTE — TELEPHONE ENCOUNTER
Contacted patient and rescheduled appointment as instructed due to Mrs. Sotelo being on vacation. Patient agreed to new time and date of appointment. Sent reminders in the mail.

## 2020-09-28 ENCOUNTER — INFUSION (OUTPATIENT)
Dept: INFUSION THERAPY | Facility: HOSPITAL | Age: 70
End: 2020-09-28
Payer: MEDICARE

## 2020-09-28 VITALS
WEIGHT: 182.63 LBS | BODY MASS INDEX: 32.36 KG/M2 | RESPIRATION RATE: 17 BRPM | TEMPERATURE: 97 F | HEIGHT: 63 IN | SYSTOLIC BLOOD PRESSURE: 146 MMHG | HEART RATE: 65 BPM | DIASTOLIC BLOOD PRESSURE: 76 MMHG

## 2020-09-28 DIAGNOSIS — C90.00 MULTIPLE MYELOMA NOT HAVING ACHIEVED REMISSION: ICD-10-CM

## 2020-09-28 DIAGNOSIS — C90.01 MULTIPLE MYELOMA IN REMISSION: Primary | ICD-10-CM

## 2020-09-28 PROCEDURE — 96401 CHEMO ANTI-NEOPL SQ/IM: CPT | Mod: HCNC

## 2020-09-28 PROCEDURE — 63600175 PHARM REV CODE 636 W HCPCS: Mod: JG,HCNC | Performed by: INTERNAL MEDICINE

## 2020-09-28 RX ORDER — SODIUM CHLORIDE 0.9 % (FLUSH) 0.9 %
10 SYRINGE (ML) INJECTION
Status: CANCELLED | OUTPATIENT
Start: 2020-10-03

## 2020-09-28 RX ORDER — HEPARIN 100 UNIT/ML
500 SYRINGE INTRAVENOUS
Status: CANCELLED | OUTPATIENT
Start: 2020-10-03

## 2020-09-28 RX ORDER — SODIUM CHLORIDE 0.9 % (FLUSH) 0.9 %
10 SYRINGE (ML) INJECTION
Status: CANCELLED | OUTPATIENT
Start: 2020-10-17

## 2020-09-28 RX ORDER — SODIUM CHLORIDE 0.9 % (FLUSH) 0.9 %
10 SYRINGE (ML) INJECTION
Status: DISCONTINUED | OUTPATIENT
Start: 2020-09-28 | End: 2020-09-28 | Stop reason: HOSPADM

## 2020-09-28 RX ORDER — HEPARIN 100 UNIT/ML
500 SYRINGE INTRAVENOUS
Status: DISCONTINUED | OUTPATIENT
Start: 2020-09-28 | End: 2020-09-28 | Stop reason: HOSPADM

## 2020-09-28 RX ORDER — HEPARIN 100 UNIT/ML
500 SYRINGE INTRAVENOUS
Status: CANCELLED | OUTPATIENT
Start: 2020-10-17

## 2020-09-28 RX ORDER — BORTEZOMIB 3.5 MG/1
1.3 INJECTION, POWDER, LYOPHILIZED, FOR SOLUTION INTRAVENOUS; SUBCUTANEOUS
Status: COMPLETED | OUTPATIENT
Start: 2020-09-28 | End: 2020-09-28

## 2020-09-28 RX ORDER — BORTEZOMIB 3.5 MG/1
1.3 INJECTION, POWDER, LYOPHILIZED, FOR SOLUTION INTRAVENOUS; SUBCUTANEOUS
Status: CANCELLED | OUTPATIENT
Start: 2020-10-17

## 2020-09-28 RX ORDER — BORTEZOMIB 3.5 MG/1
1.3 INJECTION, POWDER, LYOPHILIZED, FOR SOLUTION INTRAVENOUS; SUBCUTANEOUS
Status: CANCELLED | OUTPATIENT
Start: 2020-10-03

## 2020-09-28 RX ADMIN — BORTEZOMIB 2.5 MG: 3.5 INJECTION, POWDER, LYOPHILIZED, FOR SOLUTION INTRAVENOUS; SUBCUTANEOUS at 10:09

## 2020-09-29 ENCOUNTER — PATIENT MESSAGE (OUTPATIENT)
Dept: OTHER | Facility: OTHER | Age: 70
End: 2020-09-29

## 2020-09-29 ENCOUNTER — TELEPHONE (OUTPATIENT)
Dept: PHARMACY | Facility: CLINIC | Age: 70
End: 2020-09-29

## 2020-09-29 ENCOUNTER — TELEPHONE (OUTPATIENT)
Dept: INTERNAL MEDICINE | Facility: CLINIC | Age: 70
End: 2020-09-29

## 2020-09-29 DIAGNOSIS — Z12.31 ENCOUNTER FOR SCREENING MAMMOGRAM FOR BREAST CANCER: Primary | ICD-10-CM

## 2020-09-29 NOTE — TELEPHONE ENCOUNTER
----- Message from Nicky Machuca sent at 9/29/2020  1:31 PM CDT -----  Regarding: Patient advice  Contact: pt  Pt called in regards to getting an order for a mammogram     Pt can be reached at [phone]

## 2020-10-05 ENCOUNTER — PATIENT MESSAGE (OUTPATIENT)
Dept: ADMINISTRATIVE | Facility: HOSPITAL | Age: 70
End: 2020-10-05

## 2020-10-05 ENCOUNTER — CLINICAL SUPPORT (OUTPATIENT)
Dept: INFECTIOUS DISEASES | Facility: CLINIC | Age: 70
End: 2020-10-05
Payer: MEDICARE

## 2020-10-05 PROCEDURE — 90472 HIB PRP-T CONJUGATE VACCINE 4 DOSE IM: ICD-10-PCS | Mod: HCNC,BMT,S$GLB, | Performed by: INTERNAL MEDICINE

## 2020-10-05 PROCEDURE — 90713 POLIOVIRUS IPV SC/IM: CPT | Mod: HCNC,BMT,S$GLB, | Performed by: INTERNAL MEDICINE

## 2020-10-05 PROCEDURE — 90670 PNEUMOCOCCAL CONJUGATE VACCINE 13-VALENT LESS THAN 5YO & GREATER THAN: ICD-10-PCS | Mod: HCNC,BMT,S$GLB, | Performed by: INTERNAL MEDICINE

## 2020-10-05 PROCEDURE — 90648 HIB PRP-T VACCINE 4 DOSE IM: CPT | Mod: HCNC,BMT,S$GLB, | Performed by: INTERNAL MEDICINE

## 2020-10-05 PROCEDURE — 90471 PR IMMUNIZ ADMIN,1 SINGLE/COMB VAC/TOXOID: ICD-10-PCS | Mod: HCNC,BMT,S$GLB, | Performed by: INTERNAL MEDICINE

## 2020-10-05 PROCEDURE — G0008 FLU VACCINE - QUADRIVALENT - ADJUVANTED: ICD-10-PCS | Mod: HCNC,BMT,S$GLB, | Performed by: INTERNAL MEDICINE

## 2020-10-05 PROCEDURE — 90694 FLU VACCINE - QUADRIVALENT - ADJUVANTED: ICD-10-PCS | Mod: HCNC,BMT,S$GLB, | Performed by: INTERNAL MEDICINE

## 2020-10-05 PROCEDURE — 90700 DTAP (5 PERTUSSIS ANTIGENS) VACCINE LESS THAN 7YO IM: ICD-10-PCS | Mod: HCNC,BMT,S$GLB, | Performed by: INTERNAL MEDICINE

## 2020-10-05 PROCEDURE — G0010 ADMIN HEPATITIS B VACCINE: HCPCS | Mod: 59,HCNC,BMT,S$GLB | Performed by: INTERNAL MEDICINE

## 2020-10-05 PROCEDURE — 90472 IMMUNIZATION ADMIN EACH ADD: CPT | Mod: HCNC,BMT,S$GLB, | Performed by: INTERNAL MEDICINE

## 2020-10-05 PROCEDURE — G0010 HEPATITIS B VACCINE ADULT IM: ICD-10-PCS | Mod: 59,HCNC,BMT,S$GLB | Performed by: INTERNAL MEDICINE

## 2020-10-05 PROCEDURE — 90746 HEPB VACCINE 3 DOSE ADULT IM: CPT | Mod: HCNC,BMT,S$GLB, | Performed by: INTERNAL MEDICINE

## 2020-10-05 PROCEDURE — 90694 VACC AIIV4 NO PRSRV 0.5ML IM: CPT | Mod: HCNC,BMT,S$GLB, | Performed by: INTERNAL MEDICINE

## 2020-10-05 PROCEDURE — 99999 PR PBB SHADOW E&M-EST. PATIENT-LVL III: CPT | Mod: PBBFAC,HCNC,BMT,

## 2020-10-05 PROCEDURE — 90746 HEPATITIS B VACCINE ADULT IM: ICD-10-PCS | Mod: HCNC,BMT,S$GLB, | Performed by: INTERNAL MEDICINE

## 2020-10-05 PROCEDURE — G0009 ADMIN PNEUMOCOCCAL VACCINE: HCPCS | Mod: 59,HCNC,BMT,S$GLB | Performed by: INTERNAL MEDICINE

## 2020-10-05 PROCEDURE — 90670 PCV13 VACCINE IM: CPT | Mod: HCNC,BMT,S$GLB, | Performed by: INTERNAL MEDICINE

## 2020-10-05 PROCEDURE — 90471 IMMUNIZATION ADMIN: CPT | Mod: HCNC,BMT,S$GLB, | Performed by: INTERNAL MEDICINE

## 2020-10-05 PROCEDURE — G0009 DTAP (5 PERTUSSIS ANTIGENS) VACCINE LESS THAN 7YO IM: ICD-10-PCS | Mod: 59,HCNC,BMT,S$GLB | Performed by: INTERNAL MEDICINE

## 2020-10-05 PROCEDURE — 99999 PR PBB SHADOW E&M-EST. PATIENT-LVL III: ICD-10-PCS | Mod: PBBFAC,HCNC,BMT,

## 2020-10-05 PROCEDURE — 90700 DTAP VACCINE < 7 YRS IM: CPT | Mod: HCNC,BMT,S$GLB, | Performed by: INTERNAL MEDICINE

## 2020-10-05 PROCEDURE — G0008 ADMIN INFLUENZA VIRUS VAC: HCPCS | Mod: HCNC,BMT,S$GLB, | Performed by: INTERNAL MEDICINE

## 2020-10-05 PROCEDURE — 90648 HIB PRP-T CONJUGATE VACCINE 4 DOSE IM: ICD-10-PCS | Mod: HCNC,BMT,S$GLB, | Performed by: INTERNAL MEDICINE

## 2020-10-05 PROCEDURE — 90713 POLIOVIRUS VACCINE IPV SQ/IM: ICD-10-PCS | Mod: HCNC,BMT,S$GLB, | Performed by: INTERNAL MEDICINE

## 2020-10-05 NOTE — PROGRESS NOTES
Patient received 3 vaccines to left deltoid HD flu vaccine to right side, Prevnar 13 middle inj, and Hep B on the left side. She also received 3 vaccines to the right deltoid, Dtap vaccine to the right side, HIB vaccine in the middle and Polio vaccine to the left side.  Tolerated well and left in NAD

## 2020-10-06 ENCOUNTER — HOSPITAL ENCOUNTER (OUTPATIENT)
Dept: RADIOLOGY | Facility: HOSPITAL | Age: 70
Discharge: HOME | End: 2020-10-06
Attending: INTERNAL MEDICINE
Payer: MEDICARE

## 2020-10-06 DIAGNOSIS — Z12.31 ENCOUNTER FOR SCREENING MAMMOGRAM FOR BREAST CANCER: ICD-10-CM

## 2020-10-06 PROCEDURE — 77067 SCR MAMMO BI INCL CAD: CPT | Mod: 26,HCNC,BMT, | Performed by: RADIOLOGY

## 2020-10-06 PROCEDURE — 77067 MAMMO DIGITAL SCREENING BILAT WITH TOMO: ICD-10-PCS | Mod: 26,HCNC,BMT, | Performed by: RADIOLOGY

## 2020-10-06 PROCEDURE — 77067 SCR MAMMO BI INCL CAD: CPT | Mod: TC,HCNC

## 2020-10-06 PROCEDURE — 77063 BREAST TOMOSYNTHESIS BI: CPT | Mod: 26,HCNC,BMT, | Performed by: RADIOLOGY

## 2020-10-06 PROCEDURE — 77063 MAMMO DIGITAL SCREENING BILAT WITH TOMO: ICD-10-PCS | Mod: 26,HCNC,BMT, | Performed by: RADIOLOGY

## 2020-10-12 ENCOUNTER — INFUSION (OUTPATIENT)
Dept: INFUSION THERAPY | Facility: HOSPITAL | Age: 70
End: 2020-10-12
Payer: MEDICARE

## 2020-10-12 VITALS — HEIGHT: 63 IN | BODY MASS INDEX: 32.36 KG/M2 | WEIGHT: 182.63 LBS

## 2020-10-12 DIAGNOSIS — C90.01 MULTIPLE MYELOMA IN REMISSION: Primary | ICD-10-CM

## 2020-10-12 DIAGNOSIS — C90.00 MULTIPLE MYELOMA NOT HAVING ACHIEVED REMISSION: ICD-10-CM

## 2020-10-12 PROCEDURE — 63600175 PHARM REV CODE 636 W HCPCS: Mod: JG,HCNC | Performed by: INTERNAL MEDICINE

## 2020-10-12 PROCEDURE — 96401 CHEMO ANTI-NEOPL SQ/IM: CPT | Mod: HCNC

## 2020-10-12 RX ORDER — SODIUM CHLORIDE 0.9 % (FLUSH) 0.9 %
10 SYRINGE (ML) INJECTION
Status: DISCONTINUED | OUTPATIENT
Start: 2020-10-12 | End: 2020-10-12 | Stop reason: HOSPADM

## 2020-10-12 RX ORDER — HEPARIN 100 UNIT/ML
500 SYRINGE INTRAVENOUS
Status: DISCONTINUED | OUTPATIENT
Start: 2020-10-12 | End: 2020-10-12 | Stop reason: HOSPADM

## 2020-10-12 RX ORDER — BORTEZOMIB 3.5 MG/1
1.3 INJECTION, POWDER, LYOPHILIZED, FOR SOLUTION INTRAVENOUS; SUBCUTANEOUS
Status: COMPLETED | OUTPATIENT
Start: 2020-10-12 | End: 2020-10-12

## 2020-10-12 RX ADMIN — BORTEZOMIB 2.5 MG: 3.5 INJECTION, POWDER, LYOPHILIZED, FOR SOLUTION INTRAVENOUS; SUBCUTANEOUS at 11:10

## 2020-10-12 NOTE — NURSING
1130 pt arrived for velcade injection. Ambulatory w/ assistance to chair. Doing well. NAD noted. VSS. Reviewed meds, hx, labs, axs, tx plan. SUb q injection administered to LRAbdomen, tolerated well. Band aid applied to site. AVS declined. Pt amb w/ assistance back to lobby to  and transport request, since walker not present w/ pt.

## 2020-10-16 ENCOUNTER — HOSPITAL ENCOUNTER (OUTPATIENT)
Dept: RADIOLOGY | Facility: HOSPITAL | Age: 70
Discharge: HOME | End: 2020-10-16
Attending: INTERNAL MEDICINE
Payer: MEDICARE

## 2020-10-16 DIAGNOSIS — K76.9 LIVER DISEASE, UNSPECIFIED: ICD-10-CM

## 2020-10-16 PROCEDURE — A9585 GADOBUTROL INJECTION: HCPCS | Mod: HCNC | Performed by: INTERNAL MEDICINE

## 2020-10-16 PROCEDURE — 74183 MRI ABD W/O CNTR FLWD CNTR: CPT | Mod: TC,HCNC

## 2020-10-16 PROCEDURE — 74183 MRI ABDOMEN W WO CONTRAST: ICD-10-PCS | Mod: 26,HCNC,BMT, | Performed by: RADIOLOGY

## 2020-10-16 PROCEDURE — 25500020 PHARM REV CODE 255: Mod: HCNC | Performed by: INTERNAL MEDICINE

## 2020-10-16 PROCEDURE — 74183 MRI ABD W/O CNTR FLWD CNTR: CPT | Mod: 26,HCNC,BMT, | Performed by: RADIOLOGY

## 2020-10-16 RX ORDER — GADOBUTROL 604.72 MG/ML
10 INJECTION INTRAVENOUS
Status: COMPLETED | OUTPATIENT
Start: 2020-10-16 | End: 2020-10-16

## 2020-10-16 RX ADMIN — GADOBUTROL 10 ML: 604.72 INJECTION INTRAVENOUS at 09:10

## 2020-10-21 ENCOUNTER — PATIENT OUTREACH (OUTPATIENT)
Dept: ADMINISTRATIVE | Facility: OTHER | Age: 70
End: 2020-10-21

## 2020-10-22 ENCOUNTER — OFFICE VISIT (OUTPATIENT)
Dept: HEPATOLOGY | Facility: CLINIC | Age: 70
End: 2020-10-22
Payer: MEDICARE

## 2020-10-22 ENCOUNTER — TELEPHONE (OUTPATIENT)
Dept: HEPATOLOGY | Facility: CLINIC | Age: 70
End: 2020-10-22

## 2020-10-22 VITALS
SYSTOLIC BLOOD PRESSURE: 143 MMHG | OXYGEN SATURATION: 95 % | HEIGHT: 63 IN | WEIGHT: 182.56 LBS | HEART RATE: 77 BPM | BODY MASS INDEX: 32.35 KG/M2 | DIASTOLIC BLOOD PRESSURE: 78 MMHG

## 2020-10-22 DIAGNOSIS — R16.0 LIVER MASS: ICD-10-CM

## 2020-10-22 DIAGNOSIS — R93.2 ABNORMAL FINDINGS ON DIAGNOSTIC IMAGING OF LIVER AND BILIARY TRACT: Primary | ICD-10-CM

## 2020-10-22 PROCEDURE — 3078F DIAST BP <80 MM HG: CPT | Mod: HCNC,BMT,CPTII,S$GLB | Performed by: NURSE PRACTITIONER

## 2020-10-22 PROCEDURE — 1101F PR PT FALLS ASSESS DOC 0-1 FALLS W/OUT INJ PAST YR: ICD-10-PCS | Mod: HCNC,BMT,CPTII,S$GLB | Performed by: NURSE PRACTITIONER

## 2020-10-22 PROCEDURE — 3008F BODY MASS INDEX DOCD: CPT | Mod: HCNC,BMT,CPTII,S$GLB | Performed by: NURSE PRACTITIONER

## 2020-10-22 PROCEDURE — 1126F AMNT PAIN NOTED NONE PRSNT: CPT | Mod: HCNC,BMT,S$GLB, | Performed by: NURSE PRACTITIONER

## 2020-10-22 PROCEDURE — 99999 PR PBB SHADOW E&M-EST. PATIENT-LVL V: ICD-10-PCS | Mod: PBBFAC,HCNC,BMT, | Performed by: NURSE PRACTITIONER

## 2020-10-22 PROCEDURE — 99499 RISK ADDL DX/OHS AUDIT: ICD-10-PCS | Mod: BMT,S$GLB,, | Performed by: NURSE PRACTITIONER

## 2020-10-22 PROCEDURE — 3077F PR MOST RECENT SYSTOLIC BLOOD PRESSURE >= 140 MM HG: ICD-10-PCS | Mod: HCNC,BMT,CPTII,S$GLB | Performed by: NURSE PRACTITIONER

## 2020-10-22 PROCEDURE — 1126F PR PAIN SEVERITY QUANTIFIED, NO PAIN PRESENT: ICD-10-PCS | Mod: HCNC,BMT,S$GLB, | Performed by: NURSE PRACTITIONER

## 2020-10-22 PROCEDURE — 3077F SYST BP >= 140 MM HG: CPT | Mod: HCNC,BMT,CPTII,S$GLB | Performed by: NURSE PRACTITIONER

## 2020-10-22 PROCEDURE — 3078F PR MOST RECENT DIASTOLIC BLOOD PRESSURE < 80 MM HG: ICD-10-PCS | Mod: HCNC,BMT,CPTII,S$GLB | Performed by: NURSE PRACTITIONER

## 2020-10-22 PROCEDURE — 99999 PR PBB SHADOW E&M-EST. PATIENT-LVL V: CPT | Mod: PBBFAC,HCNC,BMT, | Performed by: NURSE PRACTITIONER

## 2020-10-22 PROCEDURE — 1159F MED LIST DOCD IN RCRD: CPT | Mod: HCNC,BMT,S$GLB, | Performed by: NURSE PRACTITIONER

## 2020-10-22 PROCEDURE — 99214 PR OFFICE/OUTPT VISIT, EST, LEVL IV, 30-39 MIN: ICD-10-PCS | Mod: HCNC,BMT,S$GLB, | Performed by: NURSE PRACTITIONER

## 2020-10-22 PROCEDURE — 99214 OFFICE O/P EST MOD 30 MIN: CPT | Mod: HCNC,BMT,S$GLB, | Performed by: NURSE PRACTITIONER

## 2020-10-22 PROCEDURE — 1159F PR MEDICATION LIST DOCUMENTED IN MEDICAL RECORD: ICD-10-PCS | Mod: HCNC,BMT,S$GLB, | Performed by: NURSE PRACTITIONER

## 2020-10-22 PROCEDURE — 1101F PT FALLS ASSESS-DOCD LE1/YR: CPT | Mod: HCNC,BMT,CPTII,S$GLB | Performed by: NURSE PRACTITIONER

## 2020-10-22 PROCEDURE — 99499 UNLISTED E&M SERVICE: CPT | Mod: BMT,S$GLB,, | Performed by: NURSE PRACTITIONER

## 2020-10-22 PROCEDURE — 3008F PR BODY MASS INDEX (BMI) DOCUMENTED: ICD-10-PCS | Mod: HCNC,BMT,CPTII,S$GLB | Performed by: NURSE PRACTITIONER

## 2020-10-22 NOTE — PROGRESS NOTES
[facility] Visit Established Patient Note    REFERRING PROVIDER: No ref. provider found    CHIEF COMPLAINT: Liver Mass    HPI: Mrs. Thompson is a 70 y.o.  female who presents in follow up for liver masses. She was previously followed by LACY Guan, and was last seen in clinic in 5/2019. She previously had transaminase elevation in 2015, which was found to be due to statin induced rhabdomyositis. Her Lipitor was stopped and her CK and liver enzymes normalized. Liver biopsy in 4/2015 showed minimal lobular injury, rare macrosteatosis, and no fibrosis. Most recent LFT's earlier this month show normal liver function and normal transaminases. She was found to have liver masses on routine imaging, which were evaluated with TPCT and MRI. However the advanced imaging was indeterminate, and the plan was to undergo MRI in 6 months. She was subsequently lost to follow up. In 2019, she was diagnosed with multiple myeloma and had innumerable indeterminate enhancing liver lesions seen on USN, TPCT, and noncontrast MRI. However, PET scan on 5/15/19 did not show any activity in the liver, and AFP, CEA, CA 19-9, and  tumor markers were all normal. She was unable to have MRI with contrast at the time, due to GFR < 30. Her GFR eventually improved and she was able to undergo MRI. The imaging was reviewed in IR conference in 9/2019, and felt to be portal venous shunts in the liver. The recommendation was to repeat MRI in 6 months. MRI in April showed multiple intrahepatic vascular lesions/ portosystemic shunts, unchanged, and no new liver lesions. The imaging was again reviewed a IR conference, with recommendation to repeat MRI again in 6 months. Most recent MRI on 10/16 again showed multiple hepatic portosystemic shunts and subcentimeter arterial hyperenhancing lesions, unchanged when compared to the previous exam, with no new hepatic lesions. Her multiple myeloma is in remission per  oncology notes; she is on maintenance chemotherapy (Velcade) per Heme/Onc. She denies jaundice, dark urine, hematemesis, melena, slowed mentation.    Review of patient's allergies indicates:   Allergen Reactions    Lipitor [atorvastatin] Itching     Itching, elevated cpk, muscle aches, statin induced myositis       Current Outpatient Medications on File Prior to Visit   Medication Sig Dispense Refill    acyclovir (ZOVIRAX) 800 MG Tab Take 1 tablet (800 mg total) by mouth 2 (two) times daily. 60 tablet 11    alirocumab (PRALUENT PEN) 75 mg/mL PnIj Inject 1 mL (75 mg total) into the skin every 14 (fourteen) days. 6 Syringe 3    amLODIPine (NORVASC) 5 MG tablet Take 1 tablet (5 mg total) by mouth once daily. 90 tablet 3    aspirin (ECOTRIN) 81 MG EC tablet Take 1 tablet (81 mg total) by mouth once daily. 90 tablet 3    calcium-vitamin D3 (OS- + D3) 500 mg(1,250mg) -200 unit per tablet Take 1 tablet by mouth 2 (two) times daily. 60 tablet 0    ferrous gluconate 324 mg (37.5 mg iron) Tab TAKE 1 TABLET BY MOUTH 2 (TWO) TIMES DAILY WITH MEALS. 180 tablet 0    gabapentin (NEURONTIN) 300 MG capsule Take 1 capsule (300mg) by mouth every morning and 2 capsules (600mg) every evening. 90 capsule 5    loratadine (CLARITIN) 10 mg tablet Take 10 mg by mouth nightly.      magnesium oxide (MAG-OX) 400 mg (241.3 mg magnesium) tablet Take 1 tablet (400 mg total) by mouth 2 (two) times daily. 60 tablet 2    omeprazole (PRILOSEC) 40 MG capsule Take 1 capsule (40 mg total) by mouth once daily. 90 capsule 3    ondansetron (ZOFRAN) 4 MG tablet Take 1 tablet (4 mg total) by mouth every 6 (six) hours as needed for Nausea. 30 tablet 1    potassium chloride SA (K-DUR,KLOR-CON) 20 MEQ tablet Take 1 tablet (20 mEq total) by mouth once daily. 30 tablet 11    sertraline (ZOLOFT) 50 MG tablet Take 1 tablet (50 mg total) by mouth once daily. 90 tablet 3    traMADoL (ULTRAM) 50 mg tablet Take 1 tablet (50 mg total) by mouth 2  (two) times daily as needed for Pain. 60 tablet 3     No current facility-administered medications on file prior to visit.        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    PSHX:  has a past surgical history that includes Appendectomy; Tubal ligation; Hemorrhoid surgery; Foot fracture surgery (10/2012); Colonoscopy; Upper gastrointestinal endoscopy; Liver biopsy (2015); Fracture surgery (Right); Cyst Removal; Esophagogastroduodenoscopy (2016); Breast biopsy (Left, 10/2011); Breast lumpectomy (Left, ); Bone marrow biopsy (Left, 10/3/2019); Esophagogastroduodenoscopy (N/A, 2020); and Colonoscopy (N/A, 2020).    FAMILY HISTORY: Negative for liver disease, reviewed in EPIC.    SOCIAL HISTORY:   Social History     Tobacco Use   Smoking Status Former Smoker    Packs/day: 0.50    Years: 20.00    Pack years: 10.00    Types: Cigarettes    Quit date: 2011    Years since quittin.5   Smokeless Tobacco Never Used       Social History     Substance and Sexual Activity   Alcohol Use Not Currently    Comment: none since 2020, very rare wine      Social History     Substance and Sexual Activity  "  Drug Use No     ROS:   GENERAL: Denies fever, chills, weight loss, (+) fatigue  HEENT: Denies headaches, dizziness, vision/hearing changes  CARDIOVASCULAR: Denies chest pain, palpitations, or edema  RESPIRATORY: Denies dyspnea, no cough  GI: Denies abdominal pain, rectal bleeding, no nausea, no vomiting. No change in bowel pattern or color.  : Denies dysuria, hematuria   SKIN: Denies rash, itching   NEURO: Denies confusion, memory loss, or mood changes  PSYCH: Denies depression or anxiety  HEME/LYMPH: Denies easy bruising or bleeding  MSK: Denies joint pain or myalgia.     PHYSICAL EXAM:   Friendly  female, in no acute distress; alert and oriented to person, place and time  VITALS: BP (!) 143/78 (BP Location: Right arm, Patient Position: Sitting, BP Method: Medium (Automatic))   Pulse 77   Ht 5' 3" (1.6 m)   Wt 82.8 kg (182 lb 8.7 oz)   LMP  (LMP Unknown)   SpO2 95%   BMI 32.34 kg/m²    HENT: Normocephalic, without obvious abnormality. Oral mucosa pink and moist.   EYES: Sclerae anicteric.   NECK: Supple. No masses or cervical adenopathy.  CARDIOVASCULAR: Regular rate and rhythm. No murmurs.  RESPIRATORY: Normal respiratory effort. BBS CTA. No wheezes or crackles.  GI: Soft, non-tender, non-distended. No palpable hepatosplenomegaly. No masses palpable. No ascites.  EXTREMITIES:  No clubbing, cyanosis or edema.  SKIN: Warm and dry. No jaundice. No rashes noted to exposed skin. No telangectasias noted. No palmar erythema.  NEURO:  Impaired gait. No asterixis.  PSYCH:  Memory intact. Thought and speech pattern appropriate. Behavior normal. No depression or anxiety noted.    LABS:  Lab Results   Component Value Date    WBC 2.99 (L) 10/16/2020    HGB 11.9 (L) 10/16/2020    HCT 39.3 10/16/2020     10/16/2020     10/16/2020    K 3.7 10/16/2020    CREATININE 1.0 10/16/2020    ALT 20 10/16/2020    AST 23 10/16/2020    ALKPHOS 68 10/16/2020    BILITOT 0.3 10/16/2020    BILIDIR 0.2 " 04/17/2015    ALBUMIN 4.0 10/16/2020    INR 0.9 10/16/2020    AFP 2.5 10/16/2020    CHOL 206 (H) 06/19/2020    TRIG 196 (H) 06/19/2020    LDLCALC 111.8 06/19/2020    HGBA1C 6.0 (H) 06/19/2020     Hepatitis A Antibody IgG   Date Value Ref Range Status   04/17/2015 Positive (A)  Final       Hepatitis B Surface Ag   Date Value Ref Range Status   05/02/2019 Negative  Final   05/02/2019 Negative  Final     Hep B Core Total Ab   Date Value Ref Range Status   05/02/2019 Negative  Final     Hep B S Ab   Date Value Ref Range Status   03/16/2015 Negative  Final     Hepatitis C Ab   Date Value Ref Range Status   05/02/2019 Negative  Final   05/02/2019 Negative  Final     Immunization History   Administered Date(s) Administered    DTaP (5 Pertussis Antigens) 08/03/2020, 10/05/2020    Hepatitis B, Adult 08/03/2020, 10/05/2020    HiB PRP-T 08/03/2020, 10/05/2020    IPV 08/03/2020, 10/05/2020    Influenza 10/02/2018    Influenza (FLUAD) - Quadrivalent - Adjuvanted - PF *Preferred* (65+) 10/05/2020    Influenza - High Dose - PF (65 years and older) 11/24/2014, 11/06/2015, 10/22/2016, 10/25/2017, 10/10/2018, 10/10/2019    Pneumococcal Conjugate - 13 Valent 11/06/2015, 08/03/2020, 10/05/2020    Pneumococcal Polysaccharide - 23 Valent 07/21/2014    Tdap 06/23/2016     DIAGNOSTIC STUDIES:    MRI ABDOMEN W/W/O CONTRAST 10/16/2020:    FINDINGS:  The liver is normal in size.  Hepatic parenchyma demonstrates diffuse signal dropout on out of phase imaging in keeping with sequela of fatty infiltration.  Again identified are several arterial hyperenhancing lesions within the right and left lobes (axial series 16, images 15, 24, 26, 51 and 54), unchanged when compared to the previous exam and corresponding with known portosystemic shunts.  Additional 0.7 cm arterial hyperenhancing focus within the periphery of the right hepatic lobe (series 11, image 43) is slightly increased in conspicuity but overall stable in size when compared to  the previous MRI.  No new lesions.  No intrahepatic bile duct dilatation.  Portal vasculature is patent.     Gallbladder is normal.  Common bile duct is normal in caliber.     There is a small sliding-type hiatal hernia.  The stomach is otherwise decompressed and not well evaluated.     Duodenum, spleen, and adrenal glands are within normal limits.     Apparent subcentimeter cystic focus noted at the level of the pancreatic genu, presumably a tortuous portion of the main pancreatic duct versus a small cystic lesion, unchanged when compared to the previous exam.  No pancreatic ductal dilatation.  No peripancreatic inflammatory changes or drainable fluid collections.     Kidneys are normal in size and location.  No enhancing renal lesions.  No hydronephrosis or hydroureter.     Small bowel is normal in caliber.  There are multiple colonic diverticuli.     There is persistent fusiform aneurysmal dilatation of the distal descending thoracic aorta, similar when compared to the previous exam.  The abdominal aorta tapers normally.  IVC is patent.     No focal mesenteric masses.  No abdominal lymphadenopathy.     Ill-defined scar noted within the left posterior breast, presumably corresponding to previous lumpectomy.  Subcutaneous soft tissues are otherwise within normal limits.     Known T7 vertebral body compression fracture is not well evaluated on this exam but appears unchanged.  No marrow signal abnormality to suggest an infiltrative process.     Impression:     1. Multiple hepatic portosystemic shunts and subcentimeter arterial hyperenhancing lesions, unchanged when compared to the previous exam.  No new hepatic lesions.  2. Additional stable chronic findings as detailed in the body of the report.    LIVER BIOPSY- 4/6/2015:    FINAL PATHOLOGIC DIAGNOSIS  LIVER (NEEDLE BIOPSY)  -Very minimal lobular disarray/injury  -Rare macrosteatosis, less than 2%  -No iron  -No fibrosis  Supplemental Diagnosis  This addendum is  issued for the results of ancillary studies.  PASD is negative. [place] red stain is negative for amyloid.  Keratin 7 is positive in bile ducts with no alterations (normal pattern staining).  All immunostain and special stain controls reacted appropriately.    ASSESSMENT/PLAN:  70 y.o.  female with liver masses felt to be portal venous shunts, stable on serial imaging, with no new focal liver lesions. She also has a prior history of elevated liver enzymes, now resolved after discontinuation of statin medication. We will present her most recent MRI at IR conference next week to confirm stability and determine timing/need for repeat imaging, and follow up with the patient accordingly.       Thank you for allowing me to participate in the care of Shelby Thompson       Hepatology Nurse Practitioner  [facility]  10/22/2020 @ 1396

## 2020-10-22 NOTE — TELEPHONE ENCOUNTER
Patient: Shelby Thompson       MRN: [id number]      : 1950     Age: 70 y.o.  [address] [facility] [address]    Provider: CHRISTY - Ashleigh Michel NP    Priority of review: Benign disease    Patient Transplant Status: Other N/A.    Reason for presentation: Reassessment    Clinical Summary:   70 year old  femle, with history of multiple myeloma, S/P lenlidomide and bortezomib. She has no underlying liver disease, with known portal venous shunts in the liver. Last reviewed at IR conference in 2020. She underwent repeat MRI as recommended on 10/16, which showed multiple hepatic portosystemic shunts and subcentimeter arterial hyperenhancing lesions, unchanged when compared to the previous exam, with no new hepatic lesions.    Imaging to be reviewed: MRI ABDOMEN W/W/O CONTRAST 10/16/2020    HCC Treatment History: None.    ABO: O POS    Platelets:   Lab Results   Component Value Date/Time     10/16/2020 08:53 AM     Creatinine:   Lab Results   Component Value Date/Time    CREATININE 1.0 10/16/2020 08:53 AM     Bilirubin:   Lab Results   Component Value Date/Time    BILITOT 0.3 10/16/2020 08:53 AM     AFP Last 3 each if available:   Lab Results   Component Value Date/Time    AFP 2.5 10/16/2020 08:53 AM    AFP 1.5 2019 03:42 AM    AFP 2.0 2015 09:15 AM       MELD: MELD-Na score: 6 at 10/16/2020  8:53 AM  MELD score: 6 at 10/16/2020  8:53 AM  Calculated from:  Serum Creatinine: 1.0 mg/dL at 10/16/2020  8:53 AM  Serum Sodium: 143 mmol/L (Rounded to 137 mmol/L) at 10/16/2020  8:53 AM  Total Bilirubin: 0.3 mg/dL (Rounded to 1 mg/dL) at 10/16/2020  8:53 AM  INR(ratio): 0.9 (Rounded to 1) at 10/16/2020  8:53 AM  Age: 70 years 6 months    Plan:     Follow-up Provider:

## 2020-10-22 NOTE — PATIENT INSTRUCTIONS
1. Recent labs show normal liver and kidney function.  2. MRI shows no new lesions, and no other concerning findings.  3. I'll present your imaging at the IR conference next week and call you with the recommendations for follow up imaging.

## 2020-10-23 ENCOUNTER — TELEPHONE (OUTPATIENT)
Dept: HEMATOLOGY/ONCOLOGY | Facility: CLINIC | Age: 70
End: 2020-10-23

## 2020-10-26 ENCOUNTER — INFUSION (OUTPATIENT)
Dept: INFUSION THERAPY | Facility: HOSPITAL | Age: 70
End: 2020-10-26
Payer: MEDICARE

## 2020-10-26 ENCOUNTER — OFFICE VISIT (OUTPATIENT)
Dept: HEMATOLOGY/ONCOLOGY | Facility: CLINIC | Age: 70
End: 2020-10-26
Payer: MEDICARE

## 2020-10-26 VITALS
BODY MASS INDEX: 32.38 KG/M2 | WEIGHT: 182.75 LBS | RESPIRATION RATE: 16 BRPM | OXYGEN SATURATION: 96 % | HEART RATE: 62 BPM | TEMPERATURE: 98 F | DIASTOLIC BLOOD PRESSURE: 75 MMHG | SYSTOLIC BLOOD PRESSURE: 150 MMHG | HEIGHT: 63 IN

## 2020-10-26 VITALS
SYSTOLIC BLOOD PRESSURE: 149 MMHG | DIASTOLIC BLOOD PRESSURE: 70 MMHG | HEART RATE: 57 BPM | TEMPERATURE: 98 F | RESPIRATION RATE: 18 BRPM

## 2020-10-26 DIAGNOSIS — I69.30 HISTORY OF CVA WITH RESIDUAL DEFICIT: ICD-10-CM

## 2020-10-26 DIAGNOSIS — I71.9 DESCENDING AORTIC ANEURYSM: ICD-10-CM

## 2020-10-26 DIAGNOSIS — C90.01 MULTIPLE MYELOMA IN REMISSION: Primary | ICD-10-CM

## 2020-10-26 DIAGNOSIS — G62.0 DRUG-INDUCED POLYNEUROPATHY: ICD-10-CM

## 2020-10-26 DIAGNOSIS — I10 ESSENTIAL HYPERTENSION: ICD-10-CM

## 2020-10-26 DIAGNOSIS — Z94.84 HISTORY OF AUTO STEM CELL TRANSPLANT: Primary | ICD-10-CM

## 2020-10-26 DIAGNOSIS — F33.42 RECURRENT MAJOR DEPRESSIVE DISORDER, IN FULL REMISSION: ICD-10-CM

## 2020-10-26 DIAGNOSIS — T45.1X5A ANEMIA DUE TO CHEMOTHERAPY: ICD-10-CM

## 2020-10-26 DIAGNOSIS — C90.00 MULTIPLE MYELOMA NOT HAVING ACHIEVED REMISSION: ICD-10-CM

## 2020-10-26 DIAGNOSIS — D64.81 ANEMIA DUE TO CHEMOTHERAPY: ICD-10-CM

## 2020-10-26 DIAGNOSIS — C90.01 MULTIPLE MYELOMA IN REMISSION: ICD-10-CM

## 2020-10-26 PROCEDURE — 3008F PR BODY MASS INDEX (BMI) DOCUMENTED: ICD-10-PCS | Mod: HCNC,BMT,CPTII,S$GLB | Performed by: NURSE PRACTITIONER

## 2020-10-26 PROCEDURE — 1159F PR MEDICATION LIST DOCUMENTED IN MEDICAL RECORD: ICD-10-PCS | Mod: HCNC,BMT,S$GLB, | Performed by: NURSE PRACTITIONER

## 2020-10-26 PROCEDURE — 63600175 PHARM REV CODE 636 W HCPCS: Mod: JG,HCNC | Performed by: INTERNAL MEDICINE

## 2020-10-26 PROCEDURE — 3077F SYST BP >= 140 MM HG: CPT | Mod: HCNC,BMT,CPTII,S$GLB | Performed by: NURSE PRACTITIONER

## 2020-10-26 PROCEDURE — 1101F PT FALLS ASSESS-DOCD LE1/YR: CPT | Mod: HCNC,BMT,CPTII,S$GLB | Performed by: NURSE PRACTITIONER

## 2020-10-26 PROCEDURE — 3078F PR MOST RECENT DIASTOLIC BLOOD PRESSURE < 80 MM HG: ICD-10-PCS | Mod: HCNC,BMT,CPTII,S$GLB | Performed by: NURSE PRACTITIONER

## 2020-10-26 PROCEDURE — 99999 PR PBB SHADOW E&M-EST. PATIENT-LVL IV: ICD-10-PCS | Mod: PBBFAC,HCNC,BMT, | Performed by: NURSE PRACTITIONER

## 2020-10-26 PROCEDURE — 1126F AMNT PAIN NOTED NONE PRSNT: CPT | Mod: HCNC,BMT,S$GLB, | Performed by: NURSE PRACTITIONER

## 2020-10-26 PROCEDURE — 96401 CHEMO ANTI-NEOPL SQ/IM: CPT | Mod: HCNC

## 2020-10-26 PROCEDURE — 1126F PR PAIN SEVERITY QUANTIFIED, NO PAIN PRESENT: ICD-10-PCS | Mod: HCNC,BMT,S$GLB, | Performed by: NURSE PRACTITIONER

## 2020-10-26 PROCEDURE — 3078F DIAST BP <80 MM HG: CPT | Mod: HCNC,BMT,CPTII,S$GLB | Performed by: NURSE PRACTITIONER

## 2020-10-26 PROCEDURE — 99215 OFFICE O/P EST HI 40 MIN: CPT | Mod: HCNC,BMT,S$GLB, | Performed by: NURSE PRACTITIONER

## 2020-10-26 PROCEDURE — 3008F BODY MASS INDEX DOCD: CPT | Mod: HCNC,BMT,CPTII,S$GLB | Performed by: NURSE PRACTITIONER

## 2020-10-26 PROCEDURE — 3077F PR MOST RECENT SYSTOLIC BLOOD PRESSURE >= 140 MM HG: ICD-10-PCS | Mod: HCNC,BMT,CPTII,S$GLB | Performed by: NURSE PRACTITIONER

## 2020-10-26 PROCEDURE — 99999 PR PBB SHADOW E&M-EST. PATIENT-LVL IV: CPT | Mod: PBBFAC,HCNC,BMT, | Performed by: NURSE PRACTITIONER

## 2020-10-26 PROCEDURE — 99215 PR OFFICE/OUTPT VISIT, EST, LEVL V, 40-54 MIN: ICD-10-PCS | Mod: HCNC,BMT,S$GLB, | Performed by: NURSE PRACTITIONER

## 2020-10-26 PROCEDURE — 1159F MED LIST DOCD IN RCRD: CPT | Mod: HCNC,BMT,S$GLB, | Performed by: NURSE PRACTITIONER

## 2020-10-26 PROCEDURE — 1101F PR PT FALLS ASSESS DOC 0-1 FALLS W/OUT INJ PAST YR: ICD-10-PCS | Mod: HCNC,BMT,CPTII,S$GLB | Performed by: NURSE PRACTITIONER

## 2020-10-26 RX ORDER — BORTEZOMIB 3.5 MG/1
1.3 INJECTION, POWDER, LYOPHILIZED, FOR SOLUTION INTRAVENOUS; SUBCUTANEOUS
Status: CANCELLED | OUTPATIENT
Start: 2020-10-26

## 2020-10-26 RX ORDER — SODIUM CHLORIDE 0.9 % (FLUSH) 0.9 %
10 SYRINGE (ML) INJECTION
Status: CANCELLED | OUTPATIENT
Start: 2020-10-26

## 2020-10-26 RX ORDER — HEPARIN 100 UNIT/ML
500 SYRINGE INTRAVENOUS
Status: DISCONTINUED | OUTPATIENT
Start: 2020-10-26 | End: 2020-10-26 | Stop reason: HOSPADM

## 2020-10-26 RX ORDER — HEPARIN 100 UNIT/ML
500 SYRINGE INTRAVENOUS
Status: CANCELLED | OUTPATIENT
Start: 2020-10-26

## 2020-10-26 RX ORDER — BORTEZOMIB 3.5 MG/1
1.3 INJECTION, POWDER, LYOPHILIZED, FOR SOLUTION INTRAVENOUS; SUBCUTANEOUS
Status: COMPLETED | OUTPATIENT
Start: 2020-10-26 | End: 2020-10-26

## 2020-10-26 RX ORDER — SODIUM CHLORIDE 0.9 % (FLUSH) 0.9 %
10 SYRINGE (ML) INJECTION
Status: DISCONTINUED | OUTPATIENT
Start: 2020-10-26 | End: 2020-10-26 | Stop reason: HOSPADM

## 2020-10-26 RX ADMIN — BORTEZOMIB 2.5 MG: 3.5 INJECTION, POWDER, LYOPHILIZED, FOR SOLUTION INTRAVENOUS; SUBCUTANEOUS at 12:10

## 2020-10-26 NOTE — Clinical Note
-schedule velcade injection 11/9, 11/23, 12/7  -cbc, cmp, serum free light chains, quantitative immunoglobulins, serum electropheresis, serum immunofixation, and appt with Dr. Tineo or NP prior to velcade injection on 12/7  Patient requests morning appointments

## 2020-10-26 NOTE — NURSING
1300 patient tolerated velcade to abd. Pt voiced no new complaints or concerns at this time. NAD noted, pt d/c home.

## 2020-10-26 NOTE — PROGRESS NOTES
PATIENT: Shelby Thompson  MRN: [id number]  DATE: 10/26/2020    Diagnosis:   1. History of auto stem cell transplant    2. Multiple myeloma in remission    3. Drug-induced polyneuropathy    4. Essential hypertension    5. Descending aortic aneurysm    6. 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  She achieved and tolerated well VRd x completing 7, 28 day cycles and achieving deep VGPR to induction. Then underwent Melphalan autologous stem cell transplant on 2/12/2020.      Extensive PMH as below.    2 prior CVAs (one in 2008, one in 2011)  L breast cancer DCIS stage 0 (s/p lumpectomy and radiation, no endocrine tx due to CVA)  HTN  DM II  Neuropathy, b/l hands  Prior smoker, quit in 2011  Hepatic lesions - vascular per recent MRI in 09 /2019 with no changes; will confirm no further rascon needed from hep  Statin Intolerance - on praluent, PCSK9 inhibitor  HFpEF - EF 55%, diastolic dysfunction  Anxiety/Depression     ADMISSION SUMMARY: 2/10-2/26/2020  Admitted 2/10, tolerated chemo and transplant well. Engrafted on day +12. Remained afebrile throughout hospital stay and no mucositis. Experienced expected side effects of nausea and diarrhea controlled with antiemetics and Imodium. Discharged home with home PT/OT    Subjective:       Initial History: Ms. Thompson is a 70 y.o. female who returns for follow up of multiple myeloma in remission. She is Day +257  from a [facility] Auto SCT, On Maintenance was initiated 5/6/20. She is tolerating maintenance Velcade well. She reports some fatigue for a few days following Velcade injections. She also reports some diarrhea for a few days following injections.  She reports some recent sinus congestion and ear fullness. Her neuropathy pain is stable. Otherwise, denies fevers, chills, chest pain, n/v and states she is eating well.     Past Medical History:   Past Medical History:   Diagnosis Date    Acute respiratory failure with hypoxia 4/30/2019    FUENTES (acute kidney injury) 5/1/2019    Allergy     Anemia     Aortic aneurysm     Breast cancer 10/2011    left breast Stage 0 DCIS    Chronic diastolic congestive heart failure 11/6/2015    Colon polyp     GERD (gastroesophageal reflux disease)     Hiatal hernia     History of colonic polyps     Hx of psychiatric care     Zoloft    HX: breast cancer     Hyperlipemia     Hypertension     ICH (intracerebral hemorrhage)     Major depressive disorder, single episode, mild 6/23/2016    Nuclear sclerosis 7/21/2014    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 7/21/2014    PAD (peripheral artery disease) 11/6/2015    Psychiatric problem     Statin-induced rhabdomyolysis     4/2015     Stroke 4/2011    Type 2 diabetes mellitus without complication, without long-term current use of insulin 2/10/2020    Walker as ambulation aid        Past Surgical HIstory:   Past Surgical History:   Procedure Laterality Date    APPENDECTOMY      BONE MARROW BIOPSY Left 10/3/2019    Procedure: Biopsy-bone marrow;  Surgeon: Jere Tineo MD;  Location: [facility] OR [address] [place] [address];  Service: Oncology;  Laterality: Left;    BREAST BIOPSY Left 10/2011    BREAST LUMPECTOMY Left 2011    DCIS    COLONOSCOPY      COLONOSCOPY N/A 1/9/2020    Procedure: COLONOSCOPY;  Surgeon: Quang De La Cruz MD;  Location: [facility] (4TH FLR);  Service: Endoscopy;  Laterality: N/A;    CYST REMOVAL      back    ESOPHAGOGASTRODUODENOSCOPY  07/2016    duodenal angioectasia    ESOPHAGOGASTRODUODENOSCOPY N/A 1/9/2020    Procedure: EGD (ESOPHAGOGASTRODUODENOSCOPY);  Surgeon: Quang De La Cruz MD;  Location: [facility] (4TH FLR);   Service: Endoscopy;  Laterality: N/A;    FOOT FRACTURE SURGERY  10/2012    right foot, with R hallux valgus repair    FRACTURE SURGERY Right     broken toe repiar    HEMORRHOID SURGERY      LIVER BIOPSY  04/2015    essentially normal, no fibrosis    TUBAL LIGATION      UPPER GASTROINTESTINAL ENDOSCOPY         Family History:   Family History   Problem Relation Age of Onset    No Known Problems Sister     Cancer Sister 63        Lung Cancer    No Known Problems Mother     Cancer Father     No Known Problems Brother     Hypertension Daughter     Fibroids Daughter         uterine    Hypertension Son     Breast cancer Sister 62    No Known Problems Brother     No Known Problems Maternal Aunt     No Known Problems Maternal Uncle     No Known Problems Paternal Aunt     No Known Problems Paternal Uncle     Hypertension Maternal Grandmother     No Known Problems Maternal Grandfather     No Known Problems Paternal Grandmother     No Known Problems Paternal Grandfather     Ovarian cancer Neg Hx     Colon cancer Neg Hx     Tremor Neg Hx     Amblyopia Neg Hx     Blindness Neg Hx     Cataracts Neg Hx     Diabetes Neg Hx     Glaucoma Neg Hx     Macular degeneration Neg Hx     Retinal detachment Neg Hx     Strabismus Neg Hx     Stroke Neg Hx     Thyroid disease Neg Hx     Esophageal cancer Neg Hx     Rectal cancer Neg Hx     Stomach cancer Neg Hx     Ulcerative colitis Neg Hx     Crohn's disease Neg Hx     Irritable bowel syndrome Neg Hx     Celiac disease Neg Hx        Social History:  reports that she quit smoking about 9 years ago. Her smoking use included cigarettes. She has a 10.00 pack-year smoking history. She has never used smokeless tobacco. She reports previous alcohol use. She reports that she does not use drugs.    Allergies:  Review of patient's allergies indicates:   Allergen Reactions    Lipitor [atorvastatin] Itching     Itching, elevated cpk, muscle aches, statin  induced myositis       Medications:  Current Outpatient Medications   Medication Sig Dispense Refill    acyclovir (ZOVIRAX) 800 MG Tab Take 1 tablet (800 mg total) by mouth 2 (two) times daily. 60 tablet 11    alirocumab (PRALUENT PEN) 75 mg/mL PnIj Inject 1 mL (75 mg total) into the skin every 14 (fourteen) days. 6 Syringe 3    amLODIPine (NORVASC) 5 MG tablet Take 1 tablet (5 mg total) by mouth once daily. 90 tablet 3    aspirin (ECOTRIN) 81 MG EC tablet Take 1 tablet (81 mg total) by mouth once daily. 90 tablet 3    calcium-vitamin D3 (OS- + D3) 500 mg(1,250mg) -200 unit per tablet Take 1 tablet by mouth 2 (two) times daily. 60 tablet 0    ferrous gluconate 324 mg (37.5 mg iron) Tab TAKE 1 TABLET BY MOUTH 2 (TWO) TIMES DAILY WITH MEALS. 180 tablet 0    gabapentin (NEURONTIN) 300 MG capsule Take 1 capsule (300mg) by mouth every morning and 2 capsules (600mg) every evening. 90 capsule 5    loratadine (CLARITIN) 10 mg tablet Take 10 mg by mouth nightly.      magnesium oxide (MAG-OX) 400 mg (241.3 mg magnesium) tablet Take 1 tablet (400 mg total) by mouth 2 (two) times daily. 60 tablet 2    omeprazole (PRILOSEC) 40 MG capsule Take 1 capsule (40 mg total) by mouth once daily. 90 capsule 3    ondansetron (ZOFRAN) 4 MG tablet Take 1 tablet (4 mg total) by mouth every 6 (six) hours as needed for Nausea. 30 tablet 1    potassium chloride SA (K-DUR,KLOR-CON) 20 MEQ tablet Take 1 tablet (20 mEq total) by mouth once daily. 30 tablet 11    sertraline (ZOLOFT) 50 MG tablet Take 1 tablet (50 mg total) by mouth once daily. 90 tablet 3    traMADoL (ULTRAM) 50 mg tablet Take 1 tablet (50 mg total) by mouth 2 (two) times daily as needed for Pain. 60 tablet 3     No current facility-administered medications for this visit.        Review of Systems   Constitutional: Negative.  Negative for appetite change, chills, fatigue and fever.   HENT: Positive for sinus pressure. Negative for ear discharge, ear pain,  "nosebleeds, postnasal drip, rhinorrhea and sore throat.    Eyes: Negative for pain.   Respiratory: Negative for cough, chest tightness and shortness of breath.    Cardiovascular: Negative for chest pain, palpitations and leg swelling.   Gastrointestinal: Positive for diarrhea. Negative for abdominal distention, abdominal pain, blood in stool, constipation, nausea and vomiting.   Genitourinary: Negative.  Negative for dysuria and hematuria.   Musculoskeletal: Negative.  Negative for back pain, gait problem and myalgias.   Skin: Negative.    Neurological: Positive for numbness. Negative for syncope and headaches.   Hematological: Negative for adenopathy. Does not bruise/bleed easily.   Psychiatric/Behavioral: Negative.  The patient is not nervous/anxious.        ECOG Performance Status: 0   Objective:      Vitals:   Vitals:    10/26/20 1010   BP: (Abnormal) 150/75   Pulse: 62   Resp: 16   Temp: 98.1 °F (36.7 °C)   TempSrc: Oral   SpO2: 96%   Weight: 82.9 kg (182 lb 12.2 oz)   Height: 5' 3" (1.6 m)        Physical Exam  Vitals signs reviewed.   Constitutional:       Appearance: Normal appearance.   HENT:      Head: Normocephalic.      Right Ear: Tympanic membrane, ear canal and external ear normal.      Left Ear: Tympanic membrane, ear canal and external ear normal.      Nose: Nose normal.      Mouth/Throat:      Mouth: Mucous membranes are dry.      Pharynx: No posterior oropharyngeal erythema.   Eyes:      Extraocular Movements: Extraocular movements intact.      Conjunctiva/sclera: Conjunctivae normal.   Neck:      Musculoskeletal: Normal range of motion.   Cardiovascular:      Rate and Rhythm: Normal rate and regular rhythm.      Pulses: Normal pulses.      Heart sounds: Normal heart sounds.      Comments: Conversational dyspnea  Pulmonary:      Effort: Pulmonary effort is normal.      Breath sounds: Normal breath sounds.   Abdominal:      General: Bowel sounds are normal. There is no distension.      Palpations: " Abdomen is soft. There is no mass.      Tenderness: There is no abdominal tenderness.   Musculoskeletal: Normal range of motion.         General: No swelling.      Right lower leg: No edema.      Left lower leg: No edema.   Skin:     General: Skin is warm and dry.      Findings: No erythema or rash.      Comments: Darkening b/l LE skin   Neurological:      General: No focal deficit present.      Mental Status: She is alert and oriented to person, place, and time.   Psychiatric:         Mood and Affect: Mood normal.         Behavior: Behavior normal.         Thought Content: Thought content normal.         Judgment: Judgment normal.         Laboratory Data:  Lab Visit on 10/26/2020   Component Date Value Ref Range Status    WBC 10/26/2020 3.57* 3.90 - 12.70 K/uL Final    RBC 10/26/2020 4.33  4.00 - 5.40 M/uL Final    Hemoglobin 10/26/2020 11.4* 12.0 - 16.0 g/dL Final    Hematocrit 10/26/2020 37.8  37.0 - 48.5 % Final    MCV 10/26/2020 87  82 - 98 fL Final    MCH 10/26/2020 26.3* 27.0 - 31.0 pg Final    MCHC 10/26/2020 30.2* 32.0 - 36.0 g/dL Final    RDW 10/26/2020 16.6* 11.5 - 14.5 % Final    Platelets 10/26/2020 164  150 - 350 K/uL Final    MPV 10/26/2020 11.6  9.2 - 12.9 fL Final    Immature Granulocytes 10/26/2020 0.3  0.0 - 0.5 % Final    Gran # (ANC) 10/26/2020 2.5  1.8 - 7.7 K/uL Final    Immature Grans (Abs) 10/26/2020 0.01  0.00 - 0.04 K/uL Final    Comment: Mild elevation in immature granulocytes is non specific and   can be seen in a variety of conditions including stress response,   acute inflammation, trauma and pregnancy. Correlation with other   laboratory and clinical findings is essential.      Lymph # 10/26/2020 0.5* 1.0 - 4.8 K/uL Final    Mono # 10/26/2020 0.4  0.3 - 1.0 K/uL Final    Eos # 10/26/2020 0.1  0.0 - 0.5 K/uL Final    Baso # 10/26/2020 0.03  0.00 - 0.20 K/uL Final    nRBC 10/26/2020 0  0 /100 WBC Final    Gran % 10/26/2020 69.2  38.0 - 73.0 % Final    Lymph %  10/26/2020 14.0* 18.0 - 48.0 % Final    Mono % 10/26/2020 12.3  4.0 - 15.0 % Final    Eosinophil % 10/26/2020 3.4  0.0 - 8.0 % Final    Basophil % 10/26/2020 0.8  0.0 - 1.9 % Final    Differential Method 10/26/2020 Automated   Final    Sodium 10/26/2020 144  136 - 145 mmol/L Final    Potassium 10/26/2020 3.6  3.5 - 5.1 mmol/L Final    Chloride 10/26/2020 107  95 - 110 mmol/L Final    CO2 10/26/2020 28  23 - 29 mmol/L Final    Glucose 10/26/2020 114* 70 - 110 mg/dL Final    BUN 10/26/2020 11  8 - 23 mg/dL Final    Creatinine 10/26/2020 1.0  0.5 - 1.4 mg/dL Final    Calcium 10/26/2020 9.1  8.7 - 10.5 mg/dL Final    Total Protein 10/26/2020 7.3  6.0 - 8.4 g/dL Final    Albumin 10/26/2020 3.8  3.5 - 5.2 g/dL Final    Total Bilirubin 10/26/2020 0.4  0.1 - 1.0 mg/dL Final    Comment: For infants and newborns, interpretation of results should be based  on gestational age, weight and in agreement with clinical  observations.  Premature Infant recommended reference ranges:  Up to 24 hours.............<8.0 mg/dL  Up to 48 hours............<12.0 mg/dL  3-5 days..................<15.0 mg/dL  6-29 days.................<15.0 mg/dL      Alkaline Phosphatase 10/26/2020 69  55 - 135 U/L Final    AST 10/26/2020 24  10 - 40 U/L Final    ALT 10/26/2020 17  10 - 44 U/L Final    Anion Gap 10/26/2020 9  8 - 16 mmol/L Final    eGFR if African American 10/26/2020 >60.0  >60 mL/min/1.73 m^2 Final    eGFR if non African American 10/26/2020 56.8* >60 mL/min/1.73 m^2 Final    Comment: Calculation used to obtain the estimated glomerular filtration  rate (eGFR) is the CKD-EPI equation.       Protein, Serum 10/26/2020 6.9  6.0 - 8.4 g/dL Final    Comment: Serum protein electrophoresis and immunofixation results should be   interpreted in clinical context in that some therapeutic agents can   result   in false positive results (example, daratumumab). Correlation with   the   patient s therapeutic regimen is required.       Albumin 10/26/2020 3.97  3.35 - 5.55 g/dL Final    Alpha-1 10/26/2020 0.27  0.17 - 0.41 g/dL Final    Alpha-2 10/26/2020 0.90  0.43 - 0.99 g/dL Final    Beta 10/26/2020 0.68  0.50 - 1.10 g/dL Final    Gamma 10/26/2020 1.08  0.67 - 1.58 g/dL Final    Immunofix Interp. 10/26/2020 SEE COMMENT   Final    Comment: Serum protein electrophoresis and immunofixation results should be   interpreted in clinical context in that some therapeutic agents can   result   in false positive results (example, daratumumab). Correlation with   the   patient s therapeutic regimen is required.  See pathologist's interpretation.      [place] Free Light Chains 10/26/2020 2.10* 0.33 - 1.94 mg/dL Final    Lambda Free Light Chains 10/26/2020 1.29  0.57 - 2.63 mg/dL Final    Kappa/Lambda FLC Ratio 10/26/2020 1.63  0.26 - 1.65 Final    IgG 10/26/2020 1225  650 - 1600 mg/dL Final    IgG Cord Blood Reference Range: 650-1600 mg/dL.    IgA 10/26/2020 71  40 - 350 mg/dL Final    IgA Cord Blood Reference Range: <5 mg/dL.    IgM 10/26/2020 25* 50 - 300 mg/dL Final    IgM Cord Blood Reference Range: <25 mg/dL.    Pathologist Interpretation SPE 10/26/2020 REVIEWED   Final    Comment: Electronically reviewed and signed by:  Destiny Lopes M.D.  Signed on 10/27/20 at 14:27  Normal total protein, normal pattern.      Pathologist Interpretation NATALY 10/26/2020 REVIEWED   Final    Comment: Electronically reviewed and signed by:  Destiny Lopes M.D.  Signed on 10/27/20 at 14:27  No monoclonal peaks identified.           Imaging:      Assessment:       1. History of auto stem cell transplant    2. Multiple myeloma in remission    3. Drug-induced polyneuropathy    4. Essential hypertension    5. Descending aortic aneurysm    6. Recurrent major depressive disorder, in full remission           Plan:     MM s/p Auto SCT  - high risk MM with 17p deletion  - Day +257 s/p Auto SCT  - started maintenance q2w velcade 5/6/20  - biochemical studies from  "last visit (10/2620) c/w CR  - cycle 13 maintenance Velcaid today  - continue ppx acyclovir  - s/p round 2 of post transplant immunizations on 10/5/2020    Anemia due to chemotherapy  - ANC and platelets wnl, hgb stable at 11.4 gm/dl    Neuropathy  - continues gabapentin and prn tramadol    SOB/descending aortic aneurysm  - CTA and dopplers ordered urgently with high concern for DVT/PE; completed 8/3/20  - incidental descending thoracic aortic aneurysm noted; referred to thoracic surgery; seen 8/7/20; per note: "at current size would not recommend any intervention as risk of rupture remains low.  Recommend surveillance CT chest every 12 months to monitor growth of the aneurysm.  Would typically recommend aspirin 81 mg daily in patients with aortic aneurysm  - ASA started (9/14/20)     HTN  - continue norvasc    Anxiety/depression  - continue zoloft    DM2  - diet controlled    Hx of CVA  - s/p 2008, 2011    HLD with Statin Intolerance  -on praluent, PCSK9 inhibitor    L breast cancer DCIS stage 0  -s/p lumpectomy and radiation, no endocrine tx due to CVA    Follow up:  -schedule velcade injection 11/9, 11/23, 12/7  -cbc, cmp, serum free light chains, quantitative immunoglobulins, serum electropheresis, serum immunofixation, and appt with Dr. Tineo or NP prior to velcade injection on 12/7  Patient requests morning appointments      Kathleen Kimball NP  Hematology/Oncology/BMT           "

## 2020-11-03 ENCOUNTER — PATIENT MESSAGE (OUTPATIENT)
Dept: INTERNAL MEDICINE | Facility: CLINIC | Age: 70
End: 2020-11-03

## 2020-11-09 ENCOUNTER — INFUSION (OUTPATIENT)
Dept: INFUSION THERAPY | Facility: HOSPITAL | Age: 70
End: 2020-11-09
Payer: MEDICARE

## 2020-11-09 VITALS
RESPIRATION RATE: 18 BRPM | TEMPERATURE: 98 F | SYSTOLIC BLOOD PRESSURE: 135 MMHG | DIASTOLIC BLOOD PRESSURE: 71 MMHG | HEART RATE: 63 BPM

## 2020-11-09 DIAGNOSIS — C90.01 MULTIPLE MYELOMA IN REMISSION: Primary | ICD-10-CM

## 2020-11-09 DIAGNOSIS — C90.00 MULTIPLE MYELOMA NOT HAVING ACHIEVED REMISSION: ICD-10-CM

## 2020-11-09 PROCEDURE — 96401 CHEMO ANTI-NEOPL SQ/IM: CPT | Mod: HCNC

## 2020-11-09 PROCEDURE — 63600175 PHARM REV CODE 636 W HCPCS: Mod: JG,HCNC | Performed by: INTERNAL MEDICINE

## 2020-11-09 RX ORDER — SODIUM CHLORIDE 0.9 % (FLUSH) 0.9 %
10 SYRINGE (ML) INJECTION
Status: CANCELLED | OUTPATIENT
Start: 2020-11-09

## 2020-11-09 RX ORDER — SODIUM CHLORIDE 0.9 % (FLUSH) 0.9 %
10 SYRINGE (ML) INJECTION
Status: CANCELLED | OUTPATIENT
Start: 2020-11-23

## 2020-11-09 RX ORDER — BORTEZOMIB 3.5 MG/1
1.3 INJECTION, POWDER, LYOPHILIZED, FOR SOLUTION INTRAVENOUS; SUBCUTANEOUS
Status: COMPLETED | OUTPATIENT
Start: 2020-11-09 | End: 2020-11-09

## 2020-11-09 RX ORDER — BORTEZOMIB 3.5 MG/1
1.3 INJECTION, POWDER, LYOPHILIZED, FOR SOLUTION INTRAVENOUS; SUBCUTANEOUS
Status: CANCELLED | OUTPATIENT
Start: 2020-11-23

## 2020-11-09 RX ORDER — BORTEZOMIB 3.5 MG/1
1.3 INJECTION, POWDER, LYOPHILIZED, FOR SOLUTION INTRAVENOUS; SUBCUTANEOUS
Status: CANCELLED | OUTPATIENT
Start: 2020-11-09

## 2020-11-09 RX ORDER — HEPARIN 100 UNIT/ML
500 SYRINGE INTRAVENOUS
Status: CANCELLED | OUTPATIENT
Start: 2020-11-23

## 2020-11-09 RX ORDER — HEPARIN 100 UNIT/ML
500 SYRINGE INTRAVENOUS
Status: CANCELLED | OUTPATIENT
Start: 2020-11-09

## 2020-11-09 RX ADMIN — BORTEZOMIB 2.5 MG: 3.5 INJECTION, POWDER, LYOPHILIZED, FOR SOLUTION INTRAVENOUS; SUBCUTANEOUS at 09:11

## 2020-11-09 NOTE — NURSING
Pt tolerated Velcade injection to the abdomen today. NAD.declined AVS. Uses my Ochnser. Discharged home. Ambulated independently.

## 2020-11-23 ENCOUNTER — INFUSION (OUTPATIENT)
Dept: INFUSION THERAPY | Facility: HOSPITAL | Age: 70
End: 2020-11-23
Payer: MEDICARE

## 2020-11-23 VITALS
HEIGHT: 63 IN | BODY MASS INDEX: 32.89 KG/M2 | DIASTOLIC BLOOD PRESSURE: 81 MMHG | TEMPERATURE: 97 F | HEART RATE: 61 BPM | SYSTOLIC BLOOD PRESSURE: 158 MMHG | WEIGHT: 185.63 LBS | RESPIRATION RATE: 18 BRPM

## 2020-11-23 DIAGNOSIS — C90.01 MULTIPLE MYELOMA IN REMISSION: Primary | ICD-10-CM

## 2020-11-23 DIAGNOSIS — C90.00 MULTIPLE MYELOMA NOT HAVING ACHIEVED REMISSION: ICD-10-CM

## 2020-11-23 PROCEDURE — 63600175 PHARM REV CODE 636 W HCPCS: Mod: JG,HCNC | Performed by: INTERNAL MEDICINE

## 2020-11-23 PROCEDURE — 96401 CHEMO ANTI-NEOPL SQ/IM: CPT | Mod: HCNC

## 2020-11-23 RX ORDER — BORTEZOMIB 3.5 MG/1
1.3 INJECTION, POWDER, LYOPHILIZED, FOR SOLUTION INTRAVENOUS; SUBCUTANEOUS
Status: COMPLETED | OUTPATIENT
Start: 2020-11-23 | End: 2020-11-23

## 2020-11-23 RX ADMIN — BORTEZOMIB 2.5 MG: 3.5 INJECTION, POWDER, LYOPHILIZED, FOR SOLUTION INTRAVENOUS; SUBCUTANEOUS at 09:11

## 2020-12-01 ENCOUNTER — OFFICE VISIT (OUTPATIENT)
Dept: INTERNAL MEDICINE | Facility: CLINIC | Age: 70
End: 2020-12-01
Payer: MEDICARE

## 2020-12-01 VITALS
SYSTOLIC BLOOD PRESSURE: 110 MMHG | BODY MASS INDEX: 32.58 KG/M2 | OXYGEN SATURATION: 98 % | HEIGHT: 63 IN | HEART RATE: 68 BPM | WEIGHT: 183.88 LBS | DIASTOLIC BLOOD PRESSURE: 62 MMHG

## 2020-12-01 DIAGNOSIS — M54.50 ACUTE BILATERAL LOW BACK PAIN WITHOUT SCIATICA: Primary | ICD-10-CM

## 2020-12-01 DIAGNOSIS — C90.00 MULTIPLE MYELOMA NOT HAVING ACHIEVED REMISSION: ICD-10-CM

## 2020-12-01 DIAGNOSIS — E11.9 TYPE 2 DIABETES MELLITUS WITHOUT COMPLICATION, WITHOUT LONG-TERM CURRENT USE OF INSULIN: ICD-10-CM

## 2020-12-01 PROCEDURE — 3074F PR MOST RECENT SYSTOLIC BLOOD PRESSURE < 130 MM HG: ICD-10-PCS | Mod: HCNC,BMT,CPTII,S$GLB | Performed by: INTERNAL MEDICINE

## 2020-12-01 PROCEDURE — 99214 PR OFFICE/OUTPT VISIT, EST, LEVL IV, 30-39 MIN: ICD-10-PCS | Mod: HCNC,BMT,S$GLB, | Performed by: INTERNAL MEDICINE

## 2020-12-01 PROCEDURE — 3074F SYST BP LT 130 MM HG: CPT | Mod: HCNC,BMT,CPTII,S$GLB | Performed by: INTERNAL MEDICINE

## 2020-12-01 PROCEDURE — 99214 OFFICE O/P EST MOD 30 MIN: CPT | Mod: HCNC,BMT,S$GLB, | Performed by: INTERNAL MEDICINE

## 2020-12-01 PROCEDURE — 3044F HG A1C LEVEL LT 7.0%: CPT | Mod: HCNC,BMT,CPTII,S$GLB | Performed by: INTERNAL MEDICINE

## 2020-12-01 PROCEDURE — 3078F PR MOST RECENT DIASTOLIC BLOOD PRESSURE < 80 MM HG: ICD-10-PCS | Mod: HCNC,BMT,CPTII,S$GLB | Performed by: INTERNAL MEDICINE

## 2020-12-01 PROCEDURE — 1159F PR MEDICATION LIST DOCUMENTED IN MEDICAL RECORD: ICD-10-PCS | Mod: HCNC,BMT,S$GLB, | Performed by: INTERNAL MEDICINE

## 2020-12-01 PROCEDURE — 3078F DIAST BP <80 MM HG: CPT | Mod: HCNC,BMT,CPTII,S$GLB | Performed by: INTERNAL MEDICINE

## 2020-12-01 PROCEDURE — 3044F PR MOST RECENT HEMOGLOBIN A1C LEVEL <7.0%: ICD-10-PCS | Mod: HCNC,BMT,CPTII,S$GLB | Performed by: INTERNAL MEDICINE

## 2020-12-01 PROCEDURE — 1159F MED LIST DOCD IN RCRD: CPT | Mod: HCNC,BMT,S$GLB, | Performed by: INTERNAL MEDICINE

## 2020-12-01 PROCEDURE — 99999 PR PBB SHADOW E&M-EST. PATIENT-LVL IV: CPT | Mod: PBBFAC,HCNC,BMT, | Performed by: INTERNAL MEDICINE

## 2020-12-01 PROCEDURE — 99999 PR PBB SHADOW E&M-EST. PATIENT-LVL IV: ICD-10-PCS | Mod: PBBFAC,HCNC,BMT, | Performed by: INTERNAL MEDICINE

## 2020-12-01 RX ORDER — NEOMYCIN SULFATE, POLYMYXIN B SULFATE, HYDROCORTISONE 3.5; 10000; 1 MG/ML; [USP'U]/ML; MG/ML
3 SOLUTION/ DROPS AURICULAR (OTIC) 4 TIMES DAILY
Qty: 1 BOTTLE | Refills: 0 | Status: SHIPPED | OUTPATIENT
Start: 2020-12-01 | End: 2020-12-01

## 2020-12-01 NOTE — PROGRESS NOTES
Subjective     Chief Complaint: back pain    History of Present Illness:  Ms. Shelby Thompson is a 70 y.o. female with multiple myeloma s/p auto stem cell transplant in 2/2020 on Velcade followed by Dr Tineo, HTN, HLD, DM, GERD, who presents for 6 month follow up.  Patient complains of lower back pain 2-3 times/week for the past 2 months.  Pain is mostly left sided, sometimes right sided, 8/10 sharp.  Patient thinks she may have been lifting something while cleaning her house at the time it started but cannot recall exactly.  Denies any back trauma or falls, however patient is prescribed walker but does not use it at home.  Pain is worse with excessive movement.  Patient usually takes tramadol with relief, she has also tried tylenol with minimal relief and topical patches (cannot recall name) with mild relief.  Denies any radiation of pain, lower limb weakness, numbness, urinary or bowel incontinence.  Last PET scan in 5/2020 showing some disease in L5 vertebral body, improved previous PET on 9/2019    Review of Systems   Constitutional: Negative for fever.   HENT: Negative for sore throat.    Eyes: Negative for blurred vision.   Respiratory: Negative for cough.    Cardiovascular: Negative for chest pain.   Gastrointestinal: Negative for abdominal pain.   Genitourinary: Negative for dysuria.   Musculoskeletal: Positive for back pain.   Neurological: Negative for headaches.   Endo/Heme/Allergies: Does not bruise/bleed easily.   Psychiatric/Behavioral: Negative for depression.       PAST HISTORY:     Past Medical History:   Diagnosis Date    Acute respiratory failure with hypoxia 4/30/2019    FUENTES (acute kidney injury) 5/1/2019    Allergy     Anemia     Aortic aneurysm     Breast cancer 10/2011    left breast Stage 0 DCIS    Chronic diastolic congestive heart failure 11/6/2015    Colon polyp     GERD (gastroesophageal reflux disease)     Hiatal hernia     History of colonic polyps     Hx of  psychiatric care     Zoloft    HX: breast cancer     Hyperlipemia     Hypertension     ICH (intracerebral hemorrhage)     Major depressive disorder, single episode, mild 6/23/2016    Nuclear sclerosis 7/21/2014    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 7/21/2014    PAD (peripheral artery disease) 11/6/2015    Psychiatric problem     Statin-induced rhabdomyolysis     4/2015     Stroke 4/2011    Type 2 diabetes mellitus without complication, without long-term current use of insulin 2/10/2020    Walker as ambulation aid        Past Surgical History:   Procedure Laterality Date    APPENDECTOMY      BONE MARROW BIOPSY Left 10/3/2019    Procedure: Biopsy-bone marrow;  Surgeon: Jere Tineo MD;  [facility];  Service: Oncology;  Laterality: Left;    BREAST BIOPSY Left 10/2011    BREAST LUMPECTOMY Left 2011    DCIS    COLONOSCOPY      COLONOSCOPY N/A 1/9/2020    Procedure: COLONOSCOPY;  Surgeon: Quang De La Cruz MD;  Location: [facility] (4TH FLR);  Service: Endoscopy;  Laterality: N/A;    CYST REMOVAL      back    ESOPHAGOGASTRODUODENOSCOPY  07/2016    duodenal angioectasia    ESOPHAGOGASTRODUODENOSCOPY N/A 1/9/2020    Procedure: EGD (ESOPHAGOGASTRODUODENOSCOPY);  Surgeon: Quang De La Cruz MD;  Location: [facility] ENDO (4TH FLR);  Service: Endoscopy;  Laterality: N/A;    FOOT FRACTURE SURGERY  10/2012    right foot, with R hallux valgus repair    FRACTURE SURGERY Right     broken toe repiar    HEMORRHOID SURGERY      LIVER BIOPSY  04/2015    essentially normal, no fibrosis    TUBAL LIGATION      UPPER GASTROINTESTINAL ENDOSCOPY         Family History   Problem Relation Age of Onset    No Known Problems Sister     Cancer Sister 63        Lung Cancer    No Known Problems Mother     Cancer Father     No Known Problems Brother     Hypertension Daughter     Fibroids Daughter         uterine    Hypertension Son     Breast cancer Sister 62    No  Known Problems Brother     No Known Problems Maternal Aunt     No Known Problems Maternal Uncle     No Known Problems Paternal Aunt     No Known Problems Paternal Uncle     Hypertension Maternal Grandmother     No Known Problems Maternal Grandfather     No Known Problems Paternal Grandmother     No Known Problems Paternal Grandfather     Ovarian cancer Neg Hx     Colon cancer Neg Hx     Tremor Neg Hx     Amblyopia Neg Hx     Blindness Neg Hx     Cataracts Neg Hx     Diabetes Neg Hx     Glaucoma Neg Hx     Macular degeneration Neg Hx     Retinal detachment Neg Hx     Strabismus Neg Hx     Stroke Neg Hx     Thyroid disease Neg Hx     Esophageal cancer Neg Hx     Rectal cancer Neg Hx     Stomach cancer Neg Hx     Ulcerative colitis Neg Hx     Crohn's disease Neg Hx     Irritable bowel syndrome Neg Hx     Celiac disease Neg Hx        Social History     Socioeconomic History    Marital status:      Spouse name: Moises    Number of children: 2    Years of education: Not on file    Highest education level: Not on file   Occupational History    Not on file   Social Needs    Financial resource strain: Not on file    Food insecurity     Worry: Not on file     Inability: Not on file    Transportation needs     Medical: Not on file     Non-medical: Not on file   Tobacco Use    Smoking status: Former Smoker     Packs/day: 0.50     Years: 20.00     Pack years: 10.00     Types: Cigarettes     Quit date: 2011     Years since quittin.6    Smokeless tobacco: Never Used   Substance and Sexual Activity    Alcohol use: Not Currently     Comment: none since 2020, very rare wine     Drug use: No    Sexual activity: Yes     Partners: Male     Birth control/protection: Post-menopausal   Lifestyle    Physical activity     Days per week: Not on file     Minutes per session: Not on file    Stress: Not on file   Relationships    Social connections     Talks on phone: Not on file      Gets together: Not on file     Attends Caoda[employer] service: Not on file     Active member of club or organization: Not on file     Attends meetings of clubs or organizations: Not on file     Relationship status: Not on file   Other Topics Concern    Patient feels they ought to cut down on drinking/drug use Not Asked    Patient annoyed by others criticizing their drinking/drug use Not Asked    Patient has felt bad or guilty about drinking/drug use Not Asked    Patient has had a drink/used drugs as an eye opener in the AM Not Asked   Social History Narrative     -Moises    2 children (Delia Li)       MEDICATIONS & ALLERGIES:     Current Outpatient Medications on File Prior to Visit   Medication Sig    acyclovir (ZOVIRAX) 800 MG Tab Take 1 tablet (800 mg total) by mouth 2 (two) times daily.    alirocumab (PRALUENT PEN) 75 mg/mL PnIj Inject 1 mL (75 mg total) into the skin every 14 (fourteen) days.    amLODIPine (NORVASC) 5 MG tablet Take 1 tablet (5 mg total) by mouth once daily.    aspirin (ECOTRIN) 81 MG EC tablet Take 1 tablet (81 mg total) by mouth once daily.    calcium-vitamin D3 (OS- + D3) 500 mg(1,250mg) -200 unit per tablet Take 1 tablet by mouth 2 (two) times daily.    ferrous gluconate 324 mg (37.5 mg iron) Tab TAKE 1 TABLET BY MOUTH 2 (TWO) TIMES DAILY WITH MEALS.    gabapentin (NEURONTIN) 300 MG capsule Take 1 capsule (300mg) by mouth every morning and 2 capsules (600mg) every evening.    loratadine (CLARITIN) 10 mg tablet Take 10 mg by mouth nightly.    magnesium oxide (MAG-OX) 400 mg (241.3 mg magnesium) tablet Take 1 tablet (400 mg total) by mouth 2 (two) times daily.    omeprazole (PRILOSEC) 40 MG capsule Take 1 capsule (40 mg total) by mouth once daily.    ondansetron (ZOFRAN) 4 MG tablet Take 1 tablet (4 mg total) by mouth every 6 (six) hours as needed for Nausea.    potassium chloride SA (K-DUR,KLOR-CON) 20 MEQ tablet Take 1 tablet (20 mEq total) by mouth once  "daily.    sertraline (ZOLOFT) 50 MG tablet Take 1 tablet (50 mg total) by mouth once daily.    traMADoL (ULTRAM) 50 mg tablet Take 1 tablet (50 mg total) by mouth 2 (two) times daily as needed for Pain.     No current facility-administered medications on file prior to visit.        Review of patient's allergies indicates:   Allergen Reactions    Lipitor [atorvastatin] Itching     Itching, elevated cpk, muscle aches, statin induced myositis       OBJECTIVE:     Vital Signs:  Vitals:    12/01/20 1008   BP: 110/62   BP Location: Right arm   Patient Position: Sitting   BP Method: Medium (Manual)   Pulse: 68   SpO2: 98%   Weight: 83.4 kg (183 lb 13.8 oz)   Height: 5' 3" (1.6 m)       Body mass index is 32.57 kg/m².     Physical Exam:  General:  Well developed, well nourished, no acute distress  Head: Normocephalic, atraumatic  Eyes: PERRL, EOMI, clear sclera  Throat: No posterior pharyngeal erythema or exudate, no tonsillar exudate  Neck: supple, normal ROM, no thyromegaly   CVS:  RRR, S1 and S2 normal, no murmurs, rubs, gallops, 2+ peripheral pulses  Resp:  Lungs clear to auscultation, no wheezes, rales, rhonchi, cough  GI:  Abdomen soft, non-tender, non-distended, normoactive bowel sounds  MSK:  No muscle atrophy, cyanosis, peripheral edema   Skin:  No rashes, ulcers, erythema  Neuro:  CNII-XII grossly intact, no focal deficits noted  Psych:  Appropriate mood and affect, normal judgement    Laboratory  Lab Results   Component Value Date    WBC 3.57 (L) 10/26/2020    HGB 11.4 (L) 10/26/2020    HCT 37.8 10/26/2020    MCV 87 10/26/2020     10/26/2020     @[id number](GLU,NA,K,Cl,CO2,BUN,Creatinine,Calcium,MG)@  Lab Results   Component Value Date    INR 0.9 10/16/2020    INR 1.0 02/03/2020    INR 0.9 10/15/2019     Lab Results   Component Value Date    HGBA1C 6.0 (H) 06/19/2020     No results for input(s): POCTGLUCOSE in the last 72 hours.    Diagnostic Results:  Labs: Reviewed    ASSESSMENT & PLAN:   Ms. Ryan" LAWRENCE Thompson is a 70 y.o. female who presents today with lower back pain.     Shelby was seen today for follow-up.    Diagnoses and all orders for this visit:    Acute bilateral low back pain without sciatica  Pain that started with lifting/movement and relieved by patches and toradol, without any red flag symptoms most likely musculoskeletal in origin.  Advised patient to continue patches or use voltaren gel.  Advised patient to avoid heavy lifting, strenuous exercise.  Advised patient use walker whenever possible.      Multiple myeloma not having achieved remission  Followed by Dr Tineo, next appointment on 12/7 for Velcade injection.      Type 2 diabetes mellitus without complication, without long-term current use of insulin  A1c on 6/19/2020 6.0%.  -     Hemoglobin A1C; Future    Other orders  -     Cancel: Basic Metabolic Panel; Future  -     Discontinue: neomycin-polymyxin-hydrocortisone (CORTISPORIN) otic solution; Place 3 drops into the left ear 4 (four) times daily.           RTC in 6 months    Case discussed with Dr Mickey Blancas MD  Internal Medicine PGY1  [facility]  [address] [place] [id number] [phone]

## 2020-12-02 ENCOUNTER — TELEPHONE (OUTPATIENT)
Dept: INTERNAL MEDICINE | Facility: CLINIC | Age: 70
End: 2020-12-02

## 2020-12-02 NOTE — TELEPHONE ENCOUNTER
----- Message from Kaleigh Almendarez sent at 12/2/2020 10:37 AM CST -----  Contact: MARISOL TOUSSAINT [[id number]] @ [phone]  Calling to discuss the reason for the medication you gave her yesterday. She don't know the name of it.

## 2020-12-02 NOTE — TELEPHONE ENCOUNTER
Spoke with pt and informed her that no medication was called in yesterday to her. Pt says she even looked on her avs and didn't see anything but the pharmacy called her about ear drops and want to know if she should take it

## 2020-12-07 ENCOUNTER — INFUSION (OUTPATIENT)
Dept: INFUSION THERAPY | Facility: HOSPITAL | Age: 70
End: 2020-12-07
Payer: MEDICARE

## 2020-12-07 ENCOUNTER — OFFICE VISIT (OUTPATIENT)
Dept: HEMATOLOGY/ONCOLOGY | Facility: CLINIC | Age: 70
End: 2020-12-07
Payer: MEDICARE

## 2020-12-07 VITALS
HEIGHT: 63 IN | HEART RATE: 71 BPM | SYSTOLIC BLOOD PRESSURE: 134 MMHG | OXYGEN SATURATION: 95 % | BODY MASS INDEX: 32.12 KG/M2 | WEIGHT: 181.31 LBS | RESPIRATION RATE: 16 BRPM | TEMPERATURE: 98 F | DIASTOLIC BLOOD PRESSURE: 85 MMHG

## 2020-12-07 DIAGNOSIS — I69.30 HISTORY OF CVA WITH RESIDUAL DEFICIT: ICD-10-CM

## 2020-12-07 DIAGNOSIS — I10 ESSENTIAL HYPERTENSION: ICD-10-CM

## 2020-12-07 DIAGNOSIS — F33.42 RECURRENT MAJOR DEPRESSIVE DISORDER, IN FULL REMISSION: ICD-10-CM

## 2020-12-07 DIAGNOSIS — C90.01 MULTIPLE MYELOMA IN REMISSION: Primary | ICD-10-CM

## 2020-12-07 DIAGNOSIS — E11.9 TYPE 2 DIABETES MELLITUS WITHOUT COMPLICATION, WITHOUT LONG-TERM CURRENT USE OF INSULIN: ICD-10-CM

## 2020-12-07 DIAGNOSIS — G62.0 DRUG-INDUCED POLYNEUROPATHY: ICD-10-CM

## 2020-12-07 DIAGNOSIS — Z78.9 STATIN INTOLERANCE: ICD-10-CM

## 2020-12-07 DIAGNOSIS — C90.00 MULTIPLE MYELOMA NOT HAVING ACHIEVED REMISSION: ICD-10-CM

## 2020-12-07 DIAGNOSIS — Z94.84 HISTORY OF AUTO STEM CELL TRANSPLANT: ICD-10-CM

## 2020-12-07 PROCEDURE — 3075F PR MOST RECENT SYSTOLIC BLOOD PRESS GE 130-139MM HG: ICD-10-PCS | Mod: HCNC,BMT,CPTII,S$GLB | Performed by: NURSE PRACTITIONER

## 2020-12-07 PROCEDURE — 1101F PT FALLS ASSESS-DOCD LE1/YR: CPT | Mod: HCNC,BMT,CPTII,S$GLB | Performed by: NURSE PRACTITIONER

## 2020-12-07 PROCEDURE — 99999 PR PBB SHADOW E&M-EST. PATIENT-LVL IV: ICD-10-PCS | Mod: PBBFAC,HCNC,BMT, | Performed by: NURSE PRACTITIONER

## 2020-12-07 PROCEDURE — 3079F DIAST BP 80-89 MM HG: CPT | Mod: HCNC,BMT,CPTII,S$GLB | Performed by: NURSE PRACTITIONER

## 2020-12-07 PROCEDURE — 3044F HG A1C LEVEL LT 7.0%: CPT | Mod: HCNC,BMT,CPTII,S$GLB | Performed by: NURSE PRACTITIONER

## 2020-12-07 PROCEDURE — 3044F PR MOST RECENT HEMOGLOBIN A1C LEVEL <7.0%: ICD-10-PCS | Mod: HCNC,BMT,CPTII,S$GLB | Performed by: NURSE PRACTITIONER

## 2020-12-07 PROCEDURE — 99215 OFFICE O/P EST HI 40 MIN: CPT | Mod: HCNC,BMT,S$GLB, | Performed by: NURSE PRACTITIONER

## 2020-12-07 PROCEDURE — 1159F MED LIST DOCD IN RCRD: CPT | Mod: HCNC,BMT,S$GLB, | Performed by: NURSE PRACTITIONER

## 2020-12-07 PROCEDURE — 3079F PR MOST RECENT DIASTOLIC BLOOD PRESSURE 80-89 MM HG: ICD-10-PCS | Mod: HCNC,BMT,CPTII,S$GLB | Performed by: NURSE PRACTITIONER

## 2020-12-07 PROCEDURE — 3008F PR BODY MASS INDEX (BMI) DOCUMENTED: ICD-10-PCS | Mod: HCNC,BMT,CPTII,S$GLB | Performed by: NURSE PRACTITIONER

## 2020-12-07 PROCEDURE — 3008F BODY MASS INDEX DOCD: CPT | Mod: HCNC,BMT,CPTII,S$GLB | Performed by: NURSE PRACTITIONER

## 2020-12-07 PROCEDURE — 99999 PR PBB SHADOW E&M-EST. PATIENT-LVL IV: CPT | Mod: PBBFAC,HCNC,BMT, | Performed by: NURSE PRACTITIONER

## 2020-12-07 PROCEDURE — 96401 CHEMO ANTI-NEOPL SQ/IM: CPT | Mod: HCNC

## 2020-12-07 PROCEDURE — 1101F PR PT FALLS ASSESS DOC 0-1 FALLS W/OUT INJ PAST YR: ICD-10-PCS | Mod: HCNC,BMT,CPTII,S$GLB | Performed by: NURSE PRACTITIONER

## 2020-12-07 PROCEDURE — 3288F PR FALLS RISK ASSESSMENT DOCUMENTED: ICD-10-PCS | Mod: HCNC,BMT,CPTII,S$GLB | Performed by: NURSE PRACTITIONER

## 2020-12-07 PROCEDURE — 3075F SYST BP GE 130 - 139MM HG: CPT | Mod: HCNC,BMT,CPTII,S$GLB | Performed by: NURSE PRACTITIONER

## 2020-12-07 PROCEDURE — 3288F FALL RISK ASSESSMENT DOCD: CPT | Mod: HCNC,BMT,CPTII,S$GLB | Performed by: NURSE PRACTITIONER

## 2020-12-07 PROCEDURE — 1126F PR PAIN SEVERITY QUANTIFIED, NO PAIN PRESENT: ICD-10-PCS | Mod: HCNC,BMT,S$GLB, | Performed by: NURSE PRACTITIONER

## 2020-12-07 PROCEDURE — 1126F AMNT PAIN NOTED NONE PRSNT: CPT | Mod: HCNC,BMT,S$GLB, | Performed by: NURSE PRACTITIONER

## 2020-12-07 PROCEDURE — 99215 PR OFFICE/OUTPT VISIT, EST, LEVL V, 40-54 MIN: ICD-10-PCS | Mod: HCNC,BMT,S$GLB, | Performed by: NURSE PRACTITIONER

## 2020-12-07 PROCEDURE — 63600175 PHARM REV CODE 636 W HCPCS: Mod: JG,HCNC | Performed by: INTERNAL MEDICINE

## 2020-12-07 PROCEDURE — 1159F PR MEDICATION LIST DOCUMENTED IN MEDICAL RECORD: ICD-10-PCS | Mod: HCNC,BMT,S$GLB, | Performed by: NURSE PRACTITIONER

## 2020-12-07 RX ORDER — NEOMYCIN SULFATE, POLYMYXIN B SULFATE, HYDROCORTISONE 3.5; 10000; 1 MG/ML; [USP'U]/ML; MG/ML
SOLUTION/ DROPS AURICULAR (OTIC)
COMMUNITY
Start: 2020-12-01 | End: 2021-05-13

## 2020-12-07 RX ORDER — BORTEZOMIB 3.5 MG/1
1.3 INJECTION, POWDER, LYOPHILIZED, FOR SOLUTION INTRAVENOUS; SUBCUTANEOUS
Status: CANCELLED | OUTPATIENT
Start: 2020-12-07

## 2020-12-07 RX ORDER — BORTEZOMIB 3.5 MG/1
1.3 INJECTION, POWDER, LYOPHILIZED, FOR SOLUTION INTRAVENOUS; SUBCUTANEOUS
Status: COMPLETED | OUTPATIENT
Start: 2020-12-07 | End: 2020-12-07

## 2020-12-07 RX ORDER — SODIUM CHLORIDE 0.9 % (FLUSH) 0.9 %
10 SYRINGE (ML) INJECTION
Status: CANCELLED | OUTPATIENT
Start: 2020-12-07

## 2020-12-07 RX ORDER — HEPARIN 100 UNIT/ML
500 SYRINGE INTRAVENOUS
Status: CANCELLED | OUTPATIENT
Start: 2020-12-07

## 2020-12-07 RX ADMIN — BORTEZOMIB 2.5 MG: 3.5 INJECTION, POWDER, LYOPHILIZED, FOR SOLUTION INTRAVENOUS; SUBCUTANEOUS at 10:12

## 2020-12-07 NOTE — Clinical Note
-schedule velcade injection 12/21, 1/4, 1/18  -cbc, cmp, serum free light chains, quantitative immunoglobulins, serum electropheresis, serum immunofixation, and appt with Dr. Tineo or NP prior to velcade injection on 1/18  Patient requests morning appointments

## 2020-12-07 NOTE — PROGRESS NOTES
PATIENT: Shelby Thompson  MRN: [id number]  DATE: 10/26/2020    Diagnosis:   1. Multiple myeloma in remission    2. History of auto stem cell transplant    3. Drug-induced polyneuropathy    4. Essential hypertension    5. Recurrent major depressive disorder, in full remission    6. Type 2 diabetes mellitus without complication, without long-term current use of insulin    7. History of CVA with residual deficit    8. Statin intolerance        Chief Complaint: Follow-up and Multiple Myeloma    Oncologic History:      Multiple Myeloma- presented w CRAB criteria (Hgb 7.1, hypercalcemia, renal function - Cr of 2.7, T7 vertebral fracture) and was diagnosed with IgG Kappa, RISS Stage III (beta-2 micro globulin of 17.5 and 14:20 translocation) High Risk disease.  She achieved and tolerated well VRd x completing 7, 28 day cycles and achieving deep VGPR to induction. Then underwent Melphalan autologous stem cell transplant on 2/12/2020.      Extensive PMH as below.    2 prior CVAs (one in 2008, one in 2011)  L breast cancer DCIS stage 0 (s/p lumpectomy and radiation, no endocrine tx due to CVA)  HTN  DM II  Neuropathy, b/l hands  Prior smoker, quit in 2011  Hepatic lesions - vascular per recent MRI in 09 /2019 with no changes; will confirm no further rascon needed from hep  Statin Intolerance - on praluent, PCSK9 inhibitor  HFpEF - EF 55%, diastolic dysfunction  Anxiety/Depression     ADMISSION SUMMARY: 2/10-2/26/2020  Admitted 2/10, tolerated chemo and transplant well. Engrafted on day +12. Remained afebrile throughout hospital stay and no mucositis. Experienced expected side effects of nausea and diarrhea controlled with antiemetics and Imodium. Discharged home with home PT/OT    Subjective:       Initial History: Ms. Thompson is a 70 y.o. female who returns for follow up of multiple myeloma in remission. She is Day +299  from a [employer] Auto SCT, On Maintenance was initiated 5/6/20. She is tolerating maintenance Velcade well. She  reports some diarrhea for a few days following injections and has occasional nausea. She was seen by PCP last week and got Cortisporin ear drops which she is completing today and ear fullness in better and reports sinus congestion improved. She denies fever or cough. She denies pain today and states back pain has improved. Her neuropathy pain is stable. Otherwise, denies fevers, chills, chest pain, n/v and states she is eating well.     Past Medical History:   Past Medical History:   Diagnosis Date    Acute respiratory failure with hypoxia 4/30/2019    FUENTES (acute kidney injury) 5/1/2019    Allergy     Anemia     Aortic aneurysm     Breast cancer 10/2011    left breast Stage 0 DCIS    Chronic diastolic congestive heart failure 11/6/2015    Colon polyp     GERD (gastroesophageal reflux disease)     Hiatal hernia     History of colonic polyps     Hx of psychiatric care     Zoloft    HX: breast cancer     Hyperlipemia     Hypertension     ICH (intracerebral hemorrhage)     Major depressive disorder, single episode, mild 6/23/2016    Nuclear sclerosis 7/21/2014    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 7/21/2014    PAD (peripheral artery disease) 11/6/2015    Psychiatric problem     Statin-induced rhabdomyolysis     4/2015     Stroke 4/2011    Type 2 diabetes mellitus without complication, without long-term current use of insulin 2/10/2020    Walker as ambulation aid        Past Surgical HIstory:   Past Surgical History:   Procedure Laterality Date    APPENDECTOMY      BONE MARROW BIOPSY Left 10/3/2019    Procedure: Biopsy-bone marrow;  Surgeon: Jere Tineo MD;  Location: [facility] OR [address] [place] [address];  Service: Oncology;  Laterality: Left;    BREAST BIOPSY Left 10/2011    BREAST LUMPECTOMY Left 2011    DCIS    COLONOSCOPY      COLONOSCOPY N/A 1/9/2020    Procedure: COLONOSCOPY;  Surgeon: Quang De La Cruz MD;  Location: [facility] (4TH FLR);  Service: Endoscopy;   Laterality: N/A;    CYST REMOVAL      back    ESOPHAGOGASTRODUODENOSCOPY  07/2016    duodenal angioectasia    ESOPHAGOGASTRODUODENOSCOPY N/A 1/9/2020    Procedure: EGD (ESOPHAGOGASTRODUODENOSCOPY);  Surgeon: Quang De La Cruz MD;  Location: [address];  Service: Endoscopy;  Laterality: N/A;    FOOT FRACTURE SURGERY  10/2012    right foot, with R hallux valgus repair    FRACTURE SURGERY Right     broken toe repiar    HEMORRHOID SURGERY      LIVER BIOPSY  04/2015    essentially normal, no fibrosis    TUBAL LIGATION      UPPER GASTROINTESTINAL ENDOSCOPY         Family History:   Family History   Problem Relation Age of Onset    No Known Problems Sister     Cancer Sister 63        Lung Cancer    No Known Problems Mother     Cancer Father     No Known Problems Brother     Hypertension Daughter     Fibroids Daughter         uterine    Hypertension Son     Breast cancer Sister 62    No Known Problems Brother     No Known Problems Maternal Aunt     No Known Problems Maternal Uncle     No Known Problems Paternal Aunt     No Known Problems Paternal Uncle     Hypertension Maternal Grandmother     No Known Problems Maternal Grandfather     No Known Problems Paternal Grandmother     No Known Problems Paternal Grandfather     Ovarian cancer Neg Hx     Colon cancer Neg Hx     Tremor Neg Hx     Amblyopia Neg Hx     Blindness Neg Hx     Cataracts Neg Hx     Diabetes Neg Hx     Glaucoma Neg Hx     Macular degeneration Neg Hx     Retinal detachment Neg Hx     Strabismus Neg Hx     Stroke Neg Hx     Thyroid disease Neg Hx     Esophageal cancer Neg Hx     Rectal cancer Neg Hx     Stomach cancer Neg Hx     Ulcerative colitis Neg Hx     Crohn's disease Neg Hx     Irritable bowel syndrome Neg Hx     Celiac disease Neg Hx        Social History:  reports that she quit smoking about 9 years ago. Her smoking use included cigarettes. She has a 10.00 pack-year smoking history. She has never  used smokeless tobacco. She reports previous alcohol use. She reports that she does not use drugs.    Allergies:  Review of patient's allergies indicates:   Allergen Reactions    Lipitor [atorvastatin] Itching     Itching, elevated cpk, muscle aches, statin induced myositis       Medications:  Current Outpatient Medications   Medication Sig Dispense Refill    acyclovir (ZOVIRAX) 800 MG Tab Take 1 tablet (800 mg total) by mouth 2 (two) times daily. 60 tablet 11    alirocumab (PRALUENT PEN) 75 mg/mL PnIj Inject 1 mL (75 mg total) into the skin every 14 (fourteen) days. 6 Syringe 3    amLODIPine (NORVASC) 5 MG tablet Take 1 tablet (5 mg total) by mouth once daily. 90 tablet 3    aspirin (ECOTRIN) 81 MG EC tablet Take 1 tablet (81 mg total) by mouth once daily. 90 tablet 3    calcium-vitamin D3 (OS- + D3) 500 mg(1,250mg) -200 unit per tablet Take 1 tablet by mouth 2 (two) times daily. 60 tablet 0    ferrous gluconate 324 mg (37.5 mg iron) Tab TAKE 1 TABLET BY MOUTH 2 (TWO) TIMES DAILY WITH MEALS. 180 tablet 0    gabapentin (NEURONTIN) 300 MG capsule Take 1 capsule (300mg) by mouth every morning and 2 capsules (600mg) every evening. 90 capsule 5    loratadine (CLARITIN) 10 mg tablet Take 10 mg by mouth nightly.      magnesium oxide (MAG-OX) 400 mg (241.3 mg magnesium) tablet Take 1 tablet (400 mg total) by mouth 2 (two) times daily. 60 tablet 2    omeprazole (PRILOSEC) 40 MG capsule Take 1 capsule (40 mg total) by mouth once daily. 90 capsule 3    ondansetron (ZOFRAN) 4 MG tablet Take 1 tablet (4 mg total) by mouth every 6 (six) hours as needed for Nausea. 30 tablet 1    potassium chloride SA (K-DUR,KLOR-CON) 20 MEQ tablet Take 1 tablet (20 mEq total) by mouth once daily. 30 tablet 11    sertraline (ZOLOFT) 50 MG tablet Take 1 tablet (50 mg total) by mouth once daily. 90 tablet 3    traMADoL (ULTRAM) 50 mg tablet Take 1 tablet (50 mg total) by mouth 2 (two) times daily as needed for Pain. 60 tablet  "3    neomycin-polymyxin-hydrocortisone (CORTISPORIN) otic solution PLACE 3 DROPS INTO THE LEFT EAR 4 (FOUR) TIMES DAILY.       No current facility-administered medications for this visit.        Review of Systems   Constitutional: Negative.  Negative for appetite change, chills, fatigue and fever.   HENT: Positive for sinus pressure. Negative for ear discharge, ear pain, nosebleeds, postnasal drip, rhinorrhea and sore throat.    Eyes: Negative for pain.   Respiratory: Negative for cough, chest tightness and shortness of breath.    Cardiovascular: Negative for chest pain, palpitations and leg swelling.   Gastrointestinal: Positive for diarrhea and nausea. Negative for abdominal distention, abdominal pain, blood in stool, constipation and vomiting.   Genitourinary: Negative.  Negative for dysuria and hematuria.   Musculoskeletal: Negative.  Negative for back pain, gait problem and myalgias.   Skin: Negative.    Neurological: Positive for numbness. Negative for syncope and headaches.   Hematological: Negative for adenopathy. Does not bruise/bleed easily.   Psychiatric/Behavioral: Negative.  The patient is not nervous/anxious.        ECOG Performance Status: 0   Objective:      Vitals:   Vitals:    12/07/20 0910   BP: 134/85   Pulse: 71   Resp: 16   Temp: 97.8 °F (36.6 °C)   TempSrc: Oral   SpO2: 95%   Weight: 82.2 kg (181 lb 5.3 oz)   Height: 5' 3" (1.6 m)        Physical Exam  Vitals signs reviewed.   Constitutional:       Appearance: Normal appearance.   HENT:      Head: Normocephalic.      Right Ear: Tympanic membrane, ear canal and external ear normal.      Left Ear: Tympanic membrane, ear canal and external ear normal.      Nose: Nose normal.      Mouth/Throat:      Mouth: Mucous membranes are dry.      Pharynx: No posterior oropharyngeal erythema.   Eyes:      Extraocular Movements: Extraocular movements intact.      Conjunctiva/sclera: Conjunctivae normal.   Neck:      Musculoskeletal: Normal range of motion. "   Cardiovascular:      Rate and Rhythm: Normal rate and regular rhythm.      Pulses: Normal pulses.      Heart sounds: Normal heart sounds.      Comments: Conversational dyspnea  Pulmonary:      Effort: Pulmonary effort is normal.      Breath sounds: Normal breath sounds.   Abdominal:      General: Bowel sounds are normal. There is no distension.      Palpations: Abdomen is soft. There is no mass.      Tenderness: There is no abdominal tenderness.   Musculoskeletal: Normal range of motion.         General: No swelling.      Right lower leg: No edema.      Left lower leg: No edema.   Skin:     General: Skin is warm and dry.      Findings: No erythema or rash.      Comments: Darkening b/l LE skin   Neurological:      General: No focal deficit present.      Mental Status: She is alert and oriented to person, place, and time.   Psychiatric:         Mood and Affect: Mood normal.         Behavior: Behavior normal.         Thought Content: Thought content normal.         Judgment: Judgment normal.         Laboratory Data:  Lab Visit on 12/07/2020   Component Date Value Ref Range Status    WBC 12/07/2020 3.17* 3.90 - 12.70 K/uL Final    RBC 12/07/2020 4.52  4.00 - 5.40 M/uL Final    Hemoglobin 12/07/2020 11.9* 12.0 - 16.0 g/dL Final    Hematocrit 12/07/2020 39.5  37.0 - 48.5 % Final    MCV 12/07/2020 87  82 - 98 fL Final    MCH 12/07/2020 26.3* 27.0 - 31.0 pg Final    MCHC 12/07/2020 30.1* 32.0 - 36.0 g/dL Final    RDW 12/07/2020 15.9* 11.5 - 14.5 % Final    Platelets 12/07/2020 152  150 - 350 K/uL Final    MPV 12/07/2020 11.4  9.2 - 12.9 fL Final    Immature Granulocytes 12/07/2020 0.0  0.0 - 0.5 % Final    Gran # (ANC) 12/07/2020 2.2  1.8 - 7.7 K/uL Final    Immature Grans (Abs) 12/07/2020 0.00  0.00 - 0.04 K/uL Final    Comment: Mild elevation in immature granulocytes is non specific and   can be seen in a variety of conditions including stress response,   acute inflammation, trauma and pregnancy.  Correlation with other   laboratory and clinical findings is essential.      Lymph # 12/07/2020 0.5* 1.0 - 4.8 K/uL Final    Mono # 12/07/2020 0.4  0.3 - 1.0 K/uL Final    Eos # 12/07/2020 0.1  0.0 - 0.5 K/uL Final    Baso # 12/07/2020 0.02  0.00 - 0.20 K/uL Final    nRBC 12/07/2020 0  0 /100 WBC Final    Gran % 12/07/2020 68.8  38.0 - 73.0 % Final    Lymph % 12/07/2020 15.5* 18.0 - 48.0 % Final    Mono % 12/07/2020 12.9  4.0 - 15.0 % Final    Eosinophil % 12/07/2020 2.2  0.0 - 8.0 % Final    Basophil % 12/07/2020 0.6  0.0 - 1.9 % Final    Differential Method 12/07/2020 Automated   Final    Sodium 12/07/2020 144  136 - 145 mmol/L Final    Potassium 12/07/2020 3.7  3.5 - 5.1 mmol/L Final    Chloride 12/07/2020 106  95 - 110 mmol/L Final    CO2 12/07/2020 28  23 - 29 mmol/L Final    Glucose 12/07/2020 110  70 - 110 mg/dL Final    BUN 12/07/2020 12  8 - 23 mg/dL Final    Creatinine 12/07/2020 1.1  0.5 - 1.4 mg/dL Final    Calcium 12/07/2020 8.9  8.7 - 10.5 mg/dL Final    Total Protein 12/07/2020 7.7  6.0 - 8.4 g/dL Final    Albumin 12/07/2020 3.9  3.5 - 5.2 g/dL Final    Total Bilirubin 12/07/2020 0.5  0.1 - 1.0 mg/dL Final    Comment: For infants and newborns, interpretation of results should be based  on gestational age, weight and in agreement with clinical  observations.  Premature Infant recommended reference ranges:  Up to 24 hours.............<8.0 mg/dL  Up to 48 hours............<12.0 mg/dL  3-5 days..................<15.0 mg/dL  6-29 days.................<15.0 mg/dL      Alkaline Phosphatase 12/07/2020 71  55 - 135 U/L Final    AST 12/07/2020 20  10 - 40 U/L Final    ALT 12/07/2020 16  10 - 44 U/L Final    Anion Gap 12/07/2020 10  8 - 16 mmol/L Final    eGFR if African American 12/07/2020 58.4* >60 mL/min/1.73 m^2 Final    eGFR if non African American 12/07/2020 50.6* >60 mL/min/1.73 m^2 Final    Comment: Calculation used to obtain the estimated glomerular filtration  rate (eGFR) is  the CKD-EPI equation.       Protein, Serum 12/07/2020 7.2  6.0 - 8.4 g/dL Final    Comment: Serum protein electrophoresis and immunofixation results should be   interpreted in clinical context in that some therapeutic agents can   result   in false positive results (example, daratumumab). Correlation with   the   patient s therapeutic regimen is required.      IgG 12/07/2020 1341  650 - 1600 mg/dL Final    IgG Cord Blood Reference Range: 650-1600 mg/dL.    IgA 12/07/2020 69  40 - 350 mg/dL Final    IgA Cord Blood Reference Range: <5 mg/dL.    IgM 12/07/2020 27* 50 - 300 mg/dL Final    IgM Cord Blood Reference Range: <25 mg/dL.    Hemoglobin A1C 12/07/2020 6.5* 4.0 - 5.6 % Final    Comment: ADA Screening Guidelines:  5.7-6.4%  Consistent with prediabetes  >or=6.5%  Consistent with diabetes  High levels of fetal hemoglobin interfere with the HbA1C  assay. Heterozygous hemoglobin variants (HbS, HgC, etc)do  not significantly interfere with this assay.   However, presence of multiple variants may affect accuracy.      Estimated Avg Glucose 12/07/2020 140* 68 - 131 mg/dL Final         Imaging:      Assessment:       1. Multiple myeloma in remission    2. History of auto stem cell transplant    3. Drug-induced polyneuropathy    4. Essential hypertension    5. Recurrent major depressive disorder, in full remission    6. Type 2 diabetes mellitus without complication, without long-term current use of insulin    7. History of CVA with residual deficit    8. Statin intolerance           Plan:     MM s/p Auto SCT  - high risk MM with 17p deletion  - Day +299 s/p Auto SCT  - started maintenance q2w velcade 5/6/20  - biochemical studies from last visit (10/26/20) c/w CR  - cycle 16 maintenance Velcaid today  - continue ppx acyclovir  - s/p round 2 of post transplant immunizations on 10/5/2020    Anemia due to chemotherapy  - ANC and platelets wnl, hgb stable at 11.9 gm/dl    Neuropathy  - continues gabapentin and prn  "tramadol    SOB/descending aortic aneurysm  - CTA and dopplers ordered urgently with high concern for DVT/PE; completed 8/3/20  - incidental descending thoracic aortic aneurysm noted; referred to thoracic surgery; seen 8/7/20; per note: "at current size would not recommend any intervention as risk of rupture remains low.  Recommend surveillance CT chest every 12 months to monitor growth of the aneurysm.  Would typically recommend aspirin 81 mg daily in patients with aortic aneurysm  - ASA started (9/14/20)     HTN  - continue norvasc    Anxiety/depression  - continue zoloft    DM2  - diet controlled    Hx of CVA  - s/p 2008, 2011    HLD with Statin Intolerance  -on praluent, PCSK9 inhibitor    L breast cancer DCIS stage 0  -s/p lumpectomy and radiation, no endocrine tx due to CVA    Follow up:  -schedule velcade injection 12/21, 1/4, 1/18  -cbc, cmp, serum free light chains, quantitative immunoglobulins, serum electropheresis, serum immunofixation, and appt with Dr. Tineo or NP prior to velcade injection on 1/18  Patient requests morning appointments      Kathleen Kimball NP  Hematology/Oncology/BMT             "

## 2020-12-11 ENCOUNTER — PATIENT MESSAGE (OUTPATIENT)
Dept: OTHER | Facility: OTHER | Age: 70
End: 2020-12-11

## 2020-12-21 ENCOUNTER — INFUSION (OUTPATIENT)
Dept: INFUSION THERAPY | Facility: HOSPITAL | Age: 70
End: 2020-12-21
Payer: MEDICARE

## 2020-12-21 VITALS
SYSTOLIC BLOOD PRESSURE: 146 MMHG | DIASTOLIC BLOOD PRESSURE: 76 MMHG | RESPIRATION RATE: 17 BRPM | TEMPERATURE: 98 F | HEART RATE: 70 BPM

## 2020-12-21 DIAGNOSIS — C90.01 MULTIPLE MYELOMA IN REMISSION: Primary | ICD-10-CM

## 2020-12-21 DIAGNOSIS — C90.00 MULTIPLE MYELOMA NOT HAVING ACHIEVED REMISSION: ICD-10-CM

## 2020-12-21 PROCEDURE — 63600175 PHARM REV CODE 636 W HCPCS: Mod: JG,HCNC | Performed by: INTERNAL MEDICINE

## 2020-12-21 PROCEDURE — 96401 CHEMO ANTI-NEOPL SQ/IM: CPT | Mod: HCNC

## 2020-12-21 RX ORDER — BORTEZOMIB 3.5 MG/1
1.3 INJECTION, POWDER, LYOPHILIZED, FOR SOLUTION INTRAVENOUS; SUBCUTANEOUS
Status: CANCELLED | OUTPATIENT
Start: 2021-01-04

## 2020-12-21 RX ORDER — BORTEZOMIB 3.5 MG/1
1.3 INJECTION, POWDER, LYOPHILIZED, FOR SOLUTION INTRAVENOUS; SUBCUTANEOUS
Status: CANCELLED | OUTPATIENT
Start: 2020-12-21

## 2020-12-21 RX ORDER — SODIUM CHLORIDE 0.9 % (FLUSH) 0.9 %
10 SYRINGE (ML) INJECTION
Status: CANCELLED | OUTPATIENT
Start: 2020-12-21

## 2020-12-21 RX ORDER — BORTEZOMIB 3.5 MG/1
1.3 INJECTION, POWDER, LYOPHILIZED, FOR SOLUTION INTRAVENOUS; SUBCUTANEOUS
Status: COMPLETED | OUTPATIENT
Start: 2020-12-21 | End: 2020-12-21

## 2020-12-21 RX ORDER — SODIUM CHLORIDE 0.9 % (FLUSH) 0.9 %
10 SYRINGE (ML) INJECTION
Status: CANCELLED | OUTPATIENT
Start: 2021-01-04

## 2020-12-21 RX ORDER — HEPARIN 100 UNIT/ML
500 SYRINGE INTRAVENOUS
Status: CANCELLED | OUTPATIENT
Start: 2020-12-21

## 2020-12-21 RX ORDER — HEPARIN 100 UNIT/ML
500 SYRINGE INTRAVENOUS
Status: CANCELLED | OUTPATIENT
Start: 2021-01-04

## 2020-12-21 RX ADMIN — BORTEZOMIB 2.5 MG: 3.5 INJECTION, POWDER, LYOPHILIZED, FOR SOLUTION INTRAVENOUS; SUBCUTANEOUS at 08:12

## 2020-12-22 ENCOUNTER — PATIENT OUTREACH (OUTPATIENT)
Dept: ADMINISTRATIVE | Facility: OTHER | Age: 70
End: 2020-12-22

## 2020-12-23 RX ORDER — ALIROCUMAB 75 MG/ML
75 INJECTION, SOLUTION SUBCUTANEOUS
Qty: 6 SYRINGE | Refills: 3 | Status: SHIPPED | OUTPATIENT
Start: 2020-12-23 | End: 2021-11-11 | Stop reason: SDUPTHER

## 2020-12-23 NOTE — PROGRESS NOTES
"I have personally taken the history and examined this patient and agree with the resident's note as stated above with the following thoughts:  /62 (BP Location: Right arm, Patient Position: Sitting, BP Method: Medium (Manual))   Pulse 68   Ht 5' 3" (1.6 m)   Wt 83.4 kg (183 lb 13.8 oz)   LMP  (LMP Unknown)   SpO2 98%   BMI 32.57 kg/m²     Discussed getting vaccines up to date.  Discussed importance of low fat diet and exercise       "

## 2020-12-23 NOTE — PROGRESS NOTES
Chart reviewed.   Immunizations: updated  Orders placed: n/a  Upcoming appts to satisfy MAKSIM topics: Optometry 12/29

## 2020-12-24 ENCOUNTER — PATIENT MESSAGE (OUTPATIENT)
Dept: PHARMACY | Facility: CLINIC | Age: 70
End: 2020-12-24

## 2020-12-28 ENCOUNTER — SPECIALTY PHARMACY (OUTPATIENT)
Dept: PHARMACY | Facility: CLINIC | Age: 70
End: 2020-12-28

## 2020-12-28 NOTE — TELEPHONE ENCOUNTER
Specialty Pharmacy - Refill Coordination    Specialty Medication Orders Linked to Encounter      Most Recent Value   Medication #1  alirocumab (PRALUENT PEN) 75 mg/mL PnIj (Order#[id number], Rx#[id number])          Refill Questions - Documented Responses      Most Recent Value   Relationship to patient of person spoken to?  Self   HIPAA/medical authority confirmed?  Yes   Any changes in contact preferences or allowed representatives?  No   Has the patient had any insurance changes?  No   Has the patient had any changes to specialty medication, dose, or instructions?  No   Has the patient started taking any new medications, herbals, or supplements?  No   Has the patient been diagnosed with any new medical conditions?  No   Does the patient have any new allergies to medications or foods?  No   Does the patient have any concerns about side effects?  No   Can the patient store medication/sharps container properly (at the correct temperature, away from children/pets, etc.)?  Yes   Can the patient call emergency services (911) in the event of an emergency?  Yes   Does the patient have any concerns or questions about taking or administering this medication as prescribed?  No   How many doses did the patient miss in the past 4 weeks or since the last fill?  0   How many doses does the patient have on hand?  0   How many days does the patient report on hand quantity will last?  0   Does the number of doses/days supply remaining match pharmacy expected amounts?  Yes   Does the patient feel that this medication is effective?  Yes   During the past 4 weeks, has patient missed any activities due to condition or medication?  No   During the past 4 weeks, did patient have any of the following urgent care visits?  None   How will the patient receive the medication?  Mail   When does the patient need to receive the medication?  12/28/20   Shipping Address  Home   Address in [facility] confirmed and updated if neccessary?  Yes    Expected Copay ($)  0   Is the patient able to afford the medication copay?  Yes   Payment Method  zero copay   Days supply of Refill  84   Would patient like to speak to a pharmacist?  No   Do you want to trigger an intervention?  No   Do you want to trigger an additional referral task?  No   Refill activity completed?  Yes   Refill activity plan  Refill scheduled   Shipment/Pickup Date:  12/28/20          Current Outpatient Medications   Medication Sig    acyclovir (ZOVIRAX) 800 MG Tab Take 1 tablet (800 mg total) by mouth 2 (two) times daily.    alirocumab (PRALUENT PEN) 75 mg/mL PnIj Inject 1 mL (75 mg total) into the skin every 14 (fourteen) days.    amLODIPine (NORVASC) 5 MG tablet Take 1 tablet (5 mg total) by mouth once daily.    aspirin (ECOTRIN) 81 MG EC tablet Take 1 tablet (81 mg total) by mouth once daily.    calcium-vitamin D3 (OS- + D3) 500 mg(1,250mg) -200 unit per tablet Take 1 tablet by mouth 2 (two) times daily.    ferrous gluconate 324 mg (37.5 mg iron) Tab TAKE 1 TABLET BY MOUTH 2 (TWO) TIMES DAILY WITH MEALS.    gabapentin (NEURONTIN) 300 MG capsule Take 1 capsule (300mg) by mouth every morning and 2 capsules (600mg) every evening.    loratadine (CLARITIN) 10 mg tablet Take 10 mg by mouth nightly.    magnesium oxide (MAG-OX) 400 mg (241.3 mg magnesium) tablet Take 1 tablet (400 mg total) by mouth 2 (two) times daily.    neomycin-polymyxin-hydrocortisone (CORTISPORIN) otic solution PLACE 3 DROPS INTO THE LEFT EAR 4 (FOUR) TIMES DAILY.    omeprazole (PRILOSEC) 40 MG capsule Take 1 capsule (40 mg total) by mouth once daily.    ondansetron (ZOFRAN) 4 MG tablet Take 1 tablet (4 mg total) by mouth every 6 (six) hours as needed for Nausea.    potassium chloride SA (K-DUR,KLOR-CON) 20 MEQ tablet Take 1 tablet (20 mEq total) by mouth once daily.    sertraline (ZOLOFT) 50 MG tablet Take 1 tablet (50 mg total) by mouth once daily.    traMADoL (ULTRAM) 50 mg tablet Take 1 tablet (50  mg total) by mouth 2 (two) times daily as needed for Pain.   Last reviewed on 12/28/2020  1:33 PM by Concepción Morrison    Review of patient's allergies indicates:   Allergen Reactions    Lipitor [atorvastatin] Itching     Itching, elevated cpk, muscle aches, statin induced myositis    Last reviewed on  12/28/2020 1:33 PM by Concepción Morrison      Tasks added this encounter   No tasks added.   Tasks due within next 3 months   12/21/2020 - Refill Call     CONCEPCIÓN MORRISON  [facility]  [address] [place] [id number]  Phone: [phone]  Fax: [phone]

## 2020-12-29 ENCOUNTER — OFFICE VISIT (OUTPATIENT)
Dept: OPTOMETRY | Facility: CLINIC | Age: 70
End: 2020-12-29
Payer: MEDICARE

## 2020-12-29 DIAGNOSIS — H52.4 PRESBYOPIA: ICD-10-CM

## 2020-12-29 DIAGNOSIS — H40.013 OPEN ANGLE WITH BORDERLINE FINDINGS OF BOTH EYES: Primary | ICD-10-CM

## 2020-12-29 DIAGNOSIS — H25.13 NUCLEAR SCLEROSIS OF BOTH EYES: ICD-10-CM

## 2020-12-29 DIAGNOSIS — E11.9 TYPE 2 DIABETES MELLITUS WITHOUT OPHTHALMIC MANIFESTATIONS: ICD-10-CM

## 2020-12-29 PROCEDURE — 99999 PR PBB SHADOW E&M-EST. PATIENT-LVL III: ICD-10-PCS | Mod: PBBFAC,HCNC,BMT, | Performed by: OPTOMETRIST

## 2020-12-29 PROCEDURE — 1101F PT FALLS ASSESS-DOCD LE1/YR: CPT | Mod: HCNC,BMT,CPTII,S$GLB | Performed by: OPTOMETRIST

## 2020-12-29 PROCEDURE — 92015 DETERMINE REFRACTIVE STATE: CPT | Mod: HCNC,BMT,S$GLB, | Performed by: OPTOMETRIST

## 2020-12-29 PROCEDURE — 3288F PR FALLS RISK ASSESSMENT DOCUMENTED: ICD-10-PCS | Mod: HCNC,BMT,CPTII,S$GLB | Performed by: OPTOMETRIST

## 2020-12-29 PROCEDURE — 1126F PR PAIN SEVERITY QUANTIFIED, NO PAIN PRESENT: ICD-10-PCS | Mod: HCNC,BMT,S$GLB, | Performed by: OPTOMETRIST

## 2020-12-29 PROCEDURE — 1101F PR PT FALLS ASSESS DOC 0-1 FALLS W/OUT INJ PAST YR: ICD-10-PCS | Mod: HCNC,BMT,CPTII,S$GLB | Performed by: OPTOMETRIST

## 2020-12-29 PROCEDURE — 99999 PR PBB SHADOW E&M-EST. PATIENT-LVL III: CPT | Mod: PBBFAC,HCNC,BMT, | Performed by: OPTOMETRIST

## 2020-12-29 PROCEDURE — 92133 CPTRZD OPH DX IMG PST SGM ON: CPT | Mod: HCNC,BMT,S$GLB, | Performed by: OPTOMETRIST

## 2020-12-29 PROCEDURE — 92004 PR EYE EXAM, NEW PATIENT,COMPREHESV: ICD-10-PCS | Mod: HCNC,BMT,S$GLB, | Performed by: OPTOMETRIST

## 2020-12-29 PROCEDURE — 92004 COMPRE OPH EXAM NEW PT 1/>: CPT | Mod: HCNC,BMT,S$GLB, | Performed by: OPTOMETRIST

## 2020-12-29 PROCEDURE — 3288F FALL RISK ASSESSMENT DOCD: CPT | Mod: HCNC,BMT,CPTII,S$GLB | Performed by: OPTOMETRIST

## 2020-12-29 PROCEDURE — 2023F DILAT RTA XM W/O RTNOPTHY: CPT | Mod: HCNC,BMT,S$GLB, | Performed by: OPTOMETRIST

## 2020-12-29 PROCEDURE — 2023F PR DILATED RETINAL EXAM W/O EVID OF RETINOPATHY: ICD-10-PCS | Mod: HCNC,BMT,S$GLB, | Performed by: OPTOMETRIST

## 2020-12-29 PROCEDURE — 92133 POSTERIOR SEGMENT OCT OPTIC NERVE(OCULAR COHERENCE TOMOGRAPHY) - OU - BOTH EYES: ICD-10-PCS | Mod: HCNC,BMT,S$GLB, | Performed by: OPTOMETRIST

## 2020-12-29 PROCEDURE — 1126F AMNT PAIN NOTED NONE PRSNT: CPT | Mod: HCNC,BMT,S$GLB, | Performed by: OPTOMETRIST

## 2020-12-29 PROCEDURE — 92015 PR REFRACTION: ICD-10-PCS | Mod: HCNC,BMT,S$GLB, | Performed by: OPTOMETRIST

## 2020-12-29 NOTE — PROGRESS NOTES
HPI     Last eye exam was 2/13/17 with Dr. Simental.  Patient states most recent glasses broke. Has noticed vision getting worse   since last exam. Has vertical diplopia when watching tv but only since her   glasses broke. Sometimes sees flashes of light temporal OU.  Patient denies headaches, floaters, and pain.  Hemoglobin A1C       Date                     Value               Ref Range             Status                12/07/2020               6.5 (H)             4.0 - 5.6 %           Final              Comment:    ADA Screening Guidelines:  5.7-6.4%  Consistent with   prediabetes  >or=6.5%  Consistent with diabetes  High levels of fetal   hemoglobin interfere with the HbA1C  assay. Heterozygous hemoglobin   variants (HbS, HgC, etc)do  not significantly interfere with this assay.     However, presence of multiple variants may affect accuracy.    ----------      Last edited by Mariel Simental, OD on 12/29/2020  8:20 AM. (History)            Assessment /Plan     For exam results, see Encounter Report.    Open angle with borderline findings of both eyes  -     OCT - Optic Nerve    Nuclear sclerosis of both eyes    Type 2 diabetes mellitus without ophthalmic manifestations    Presbyopia          1.  Due to increased c/d ratio.  OCT and eye pressure normal OU.  No family history of glaucoma.  Most likely physiological.  Monitor yearly  2.  Educated on cataracts and affects on vision.  Monitor for now.  3.  No retinopathy--monitor yearly.  BS control.  Eye health normal OU.  4.  Bifocal rx given

## 2021-01-04 ENCOUNTER — INFUSION (OUTPATIENT)
Dept: INFUSION THERAPY | Facility: HOSPITAL | Age: 71
End: 2021-01-04
Payer: MEDICARE

## 2021-01-04 ENCOUNTER — PATIENT MESSAGE (OUTPATIENT)
Dept: ADMINISTRATIVE | Facility: HOSPITAL | Age: 71
End: 2021-01-04

## 2021-01-04 VITALS
SYSTOLIC BLOOD PRESSURE: 168 MMHG | BODY MASS INDEX: 32.15 KG/M2 | HEART RATE: 55 BPM | DIASTOLIC BLOOD PRESSURE: 78 MMHG | WEIGHT: 181.44 LBS | RESPIRATION RATE: 18 BRPM | HEIGHT: 63 IN

## 2021-01-04 DIAGNOSIS — C90.00 MULTIPLE MYELOMA NOT HAVING ACHIEVED REMISSION: ICD-10-CM

## 2021-01-04 DIAGNOSIS — C90.01 MULTIPLE MYELOMA IN REMISSION: Primary | ICD-10-CM

## 2021-01-04 PROCEDURE — 63600175 PHARM REV CODE 636 W HCPCS: Mod: JG,HCNC | Performed by: INTERNAL MEDICINE

## 2021-01-04 PROCEDURE — 96401 CHEMO ANTI-NEOPL SQ/IM: CPT | Mod: HCNC

## 2021-01-04 RX ORDER — BORTEZOMIB 3.5 MG/1
1.3 INJECTION, POWDER, LYOPHILIZED, FOR SOLUTION INTRAVENOUS; SUBCUTANEOUS
Status: COMPLETED | OUTPATIENT
Start: 2021-01-04 | End: 2021-01-04

## 2021-01-04 RX ADMIN — BORTEZOMIB 2.5 MG: 3.5 INJECTION, POWDER, LYOPHILIZED, FOR SOLUTION INTRAVENOUS; SUBCUTANEOUS at 09:01

## 2021-01-19 ENCOUNTER — INFUSION (OUTPATIENT)
Dept: INFUSION THERAPY | Facility: HOSPITAL | Age: 71
End: 2021-01-19
Payer: MEDICARE

## 2021-01-19 ENCOUNTER — OFFICE VISIT (OUTPATIENT)
Dept: HEMATOLOGY/ONCOLOGY | Facility: CLINIC | Age: 71
End: 2021-01-19
Payer: MEDICARE

## 2021-01-19 VITALS
TEMPERATURE: 99 F | SYSTOLIC BLOOD PRESSURE: 139 MMHG | DIASTOLIC BLOOD PRESSURE: 77 MMHG | HEART RATE: 52 BPM | RESPIRATION RATE: 18 BRPM

## 2021-01-19 VITALS
SYSTOLIC BLOOD PRESSURE: 141 MMHG | RESPIRATION RATE: 16 BRPM | OXYGEN SATURATION: 98 % | BODY MASS INDEX: 32.12 KG/M2 | HEART RATE: 58 BPM | DIASTOLIC BLOOD PRESSURE: 75 MMHG | HEIGHT: 63 IN | WEIGHT: 181.31 LBS

## 2021-01-19 DIAGNOSIS — Z94.84 HISTORY OF AUTO STEM CELL TRANSPLANT: Primary | ICD-10-CM

## 2021-01-19 DIAGNOSIS — C90.00 MULTIPLE MYELOMA NOT HAVING ACHIEVED REMISSION: ICD-10-CM

## 2021-01-19 DIAGNOSIS — C90.00 MULTIPLE MYELOMA, REMISSION STATUS UNSPECIFIED: ICD-10-CM

## 2021-01-19 DIAGNOSIS — G62.0 DRUG-INDUCED POLYNEUROPATHY: ICD-10-CM

## 2021-01-19 DIAGNOSIS — C90.01 MULTIPLE MYELOMA IN REMISSION: Primary | ICD-10-CM

## 2021-01-19 DIAGNOSIS — Z86.73 HISTORY OF CVA (CEREBROVASCULAR ACCIDENT): ICD-10-CM

## 2021-01-19 PROCEDURE — 3078F PR MOST RECENT DIASTOLIC BLOOD PRESSURE < 80 MM HG: ICD-10-PCS | Mod: HCNC,BMT,CPTII,S$GLB | Performed by: INTERNAL MEDICINE

## 2021-01-19 PROCEDURE — 3008F PR BODY MASS INDEX (BMI) DOCUMENTED: ICD-10-PCS | Mod: HCNC,BMT,CPTII,S$GLB | Performed by: INTERNAL MEDICINE

## 2021-01-19 PROCEDURE — 99215 OFFICE O/P EST HI 40 MIN: CPT | Mod: HCNC,BMT,S$GLB, | Performed by: INTERNAL MEDICINE

## 2021-01-19 PROCEDURE — 99999 PR PBB SHADOW E&M-EST. PATIENT-LVL III: ICD-10-PCS | Mod: PBBFAC,HCNC,BMT, | Performed by: INTERNAL MEDICINE

## 2021-01-19 PROCEDURE — 3072F PR LOW RISK FOR RETINOPATHY: ICD-10-PCS | Mod: HCNC,BMT,S$GLB, | Performed by: INTERNAL MEDICINE

## 2021-01-19 PROCEDURE — 99999 PR PBB SHADOW E&M-EST. PATIENT-LVL III: CPT | Mod: PBBFAC,HCNC,BMT, | Performed by: INTERNAL MEDICINE

## 2021-01-19 PROCEDURE — 3072F LOW RISK FOR RETINOPATHY: CPT | Mod: HCNC,BMT,S$GLB, | Performed by: INTERNAL MEDICINE

## 2021-01-19 PROCEDURE — 99215 PR OFFICE/OUTPT VISIT, EST, LEVL V, 40-54 MIN: ICD-10-PCS | Mod: HCNC,BMT,S$GLB, | Performed by: INTERNAL MEDICINE

## 2021-01-19 PROCEDURE — 3078F DIAST BP <80 MM HG: CPT | Mod: HCNC,BMT,CPTII,S$GLB | Performed by: INTERNAL MEDICINE

## 2021-01-19 PROCEDURE — 3077F PR MOST RECENT SYSTOLIC BLOOD PRESSURE >= 140 MM HG: ICD-10-PCS | Mod: HCNC,BMT,CPTII,S$GLB | Performed by: INTERNAL MEDICINE

## 2021-01-19 PROCEDURE — 3288F FALL RISK ASSESSMENT DOCD: CPT | Mod: HCNC,BMT,CPTII,S$GLB | Performed by: INTERNAL MEDICINE

## 2021-01-19 PROCEDURE — 1126F AMNT PAIN NOTED NONE PRSNT: CPT | Mod: HCNC,BMT,S$GLB, | Performed by: INTERNAL MEDICINE

## 2021-01-19 PROCEDURE — 1159F MED LIST DOCD IN RCRD: CPT | Mod: HCNC,BMT,S$GLB, | Performed by: INTERNAL MEDICINE

## 2021-01-19 PROCEDURE — 63600175 PHARM REV CODE 636 W HCPCS: Mod: JG,HCNC | Performed by: INTERNAL MEDICINE

## 2021-01-19 PROCEDURE — 99499 UNLISTED E&M SERVICE: CPT | Mod: BMT,S$GLB,, | Performed by: INTERNAL MEDICINE

## 2021-01-19 PROCEDURE — 1126F PR PAIN SEVERITY QUANTIFIED, NO PAIN PRESENT: ICD-10-PCS | Mod: HCNC,BMT,S$GLB, | Performed by: INTERNAL MEDICINE

## 2021-01-19 PROCEDURE — 1159F PR MEDICATION LIST DOCUMENTED IN MEDICAL RECORD: ICD-10-PCS | Mod: HCNC,BMT,S$GLB, | Performed by: INTERNAL MEDICINE

## 2021-01-19 PROCEDURE — 3288F PR FALLS RISK ASSESSMENT DOCUMENTED: ICD-10-PCS | Mod: HCNC,BMT,CPTII,S$GLB | Performed by: INTERNAL MEDICINE

## 2021-01-19 PROCEDURE — 3077F SYST BP >= 140 MM HG: CPT | Mod: HCNC,BMT,CPTII,S$GLB | Performed by: INTERNAL MEDICINE

## 2021-01-19 PROCEDURE — 99499 RISK ADDL DX/OHS AUDIT: ICD-10-PCS | Mod: BMT,S$GLB,, | Performed by: INTERNAL MEDICINE

## 2021-01-19 PROCEDURE — 1101F PT FALLS ASSESS-DOCD LE1/YR: CPT | Mod: HCNC,BMT,CPTII,S$GLB | Performed by: INTERNAL MEDICINE

## 2021-01-19 PROCEDURE — 96401 CHEMO ANTI-NEOPL SQ/IM: CPT | Mod: HCNC

## 2021-01-19 PROCEDURE — 1101F PR PT FALLS ASSESS DOC 0-1 FALLS W/OUT INJ PAST YR: ICD-10-PCS | Mod: HCNC,BMT,CPTII,S$GLB | Performed by: INTERNAL MEDICINE

## 2021-01-19 PROCEDURE — 3008F BODY MASS INDEX DOCD: CPT | Mod: HCNC,BMT,CPTII,S$GLB | Performed by: INTERNAL MEDICINE

## 2021-01-19 RX ORDER — BORTEZOMIB 3.5 MG/1
1.3 INJECTION, POWDER, LYOPHILIZED, FOR SOLUTION INTRAVENOUS; SUBCUTANEOUS
Status: CANCELLED | OUTPATIENT
Start: 2021-01-19

## 2021-01-19 RX ORDER — HEPARIN 100 UNIT/ML
500 SYRINGE INTRAVENOUS
Status: CANCELLED | OUTPATIENT
Start: 2021-02-02

## 2021-01-19 RX ORDER — SODIUM CHLORIDE 0.9 % (FLUSH) 0.9 %
10 SYRINGE (ML) INJECTION
Status: CANCELLED | OUTPATIENT
Start: 2021-02-02

## 2021-01-19 RX ORDER — SODIUM CHLORIDE 0.9 % (FLUSH) 0.9 %
10 SYRINGE (ML) INJECTION
Status: CANCELLED | OUTPATIENT
Start: 2021-01-19

## 2021-01-19 RX ORDER — BORTEZOMIB 3.5 MG/1
1.3 INJECTION, POWDER, LYOPHILIZED, FOR SOLUTION INTRAVENOUS; SUBCUTANEOUS
Status: CANCELLED | OUTPATIENT
Start: 2021-02-01

## 2021-01-19 RX ORDER — BORTEZOMIB 3.5 MG/1
1 INJECTION, POWDER, LYOPHILIZED, FOR SOLUTION INTRAVENOUS; SUBCUTANEOUS
Status: CANCELLED | OUTPATIENT
Start: 2021-01-19

## 2021-01-19 RX ORDER — BORTEZOMIB 3.5 MG/1
1 INJECTION, POWDER, LYOPHILIZED, FOR SOLUTION INTRAVENOUS; SUBCUTANEOUS
Status: COMPLETED | OUTPATIENT
Start: 2021-01-19 | End: 2021-01-19

## 2021-01-19 RX ORDER — HEPARIN 100 UNIT/ML
500 SYRINGE INTRAVENOUS
Status: CANCELLED | OUTPATIENT
Start: 2021-01-19

## 2021-01-19 RX ADMIN — BORTEZOMIB 1.9 MG: 3.5 INJECTION, POWDER, LYOPHILIZED, FOR SOLUTION INTRAVENOUS; SUBCUTANEOUS at 10:01

## 2021-01-29 ENCOUNTER — PES CALL (OUTPATIENT)
Dept: ADMINISTRATIVE | Facility: CLINIC | Age: 71
End: 2021-01-29

## 2021-02-02 ENCOUNTER — PROCEDURE VISIT (OUTPATIENT)
Dept: HEMATOLOGY/ONCOLOGY | Facility: CLINIC | Age: 71
End: 2021-02-02
Payer: MEDICARE

## 2021-02-02 ENCOUNTER — INFUSION (OUTPATIENT)
Dept: INFUSION THERAPY | Facility: HOSPITAL | Age: 71
End: 2021-02-02
Payer: MEDICARE

## 2021-02-02 VITALS
RESPIRATION RATE: 18 BRPM | TEMPERATURE: 98 F | HEART RATE: 63 BPM | SYSTOLIC BLOOD PRESSURE: 158 MMHG | DIASTOLIC BLOOD PRESSURE: 69 MMHG

## 2021-02-02 VITALS
BODY MASS INDEX: 32.12 KG/M2 | SYSTOLIC BLOOD PRESSURE: 137 MMHG | RESPIRATION RATE: 16 BRPM | DIASTOLIC BLOOD PRESSURE: 77 MMHG | HEART RATE: 62 BPM | HEIGHT: 63 IN | OXYGEN SATURATION: 95 % | TEMPERATURE: 98 F

## 2021-02-02 DIAGNOSIS — C90.00 MULTIPLE MYELOMA NOT HAVING ACHIEVED REMISSION: ICD-10-CM

## 2021-02-02 DIAGNOSIS — C90.01 MULTIPLE MYELOMA IN REMISSION: Primary | ICD-10-CM

## 2021-02-02 DIAGNOSIS — C90.00 MULTIPLE MYELOMA, REMISSION STATUS UNSPECIFIED: Primary | ICD-10-CM

## 2021-02-02 PROCEDURE — 88305 TISSUE EXAM BY PATHOLOGIST: CPT | Performed by: PATHOLOGY

## 2021-02-02 PROCEDURE — 88305 TISSUE EXAM BY PATHOLOGIST: CPT | Mod: 26,BMT,, | Performed by: PATHOLOGY

## 2021-02-02 PROCEDURE — 88365 PR  TISSUE HYBRIDIZATION: ICD-10-PCS | Mod: 26,BMT,, | Performed by: PATHOLOGY

## 2021-02-02 PROCEDURE — 88313 PR  SPECIAL STAINS,GROUP II: ICD-10-PCS | Mod: 26,BMT,, | Performed by: PATHOLOGY

## 2021-02-02 PROCEDURE — 88184 FLOWCYTOMETRY/ TC 1 MARKER: CPT | Mod: HCNC | Performed by: PATHOLOGY

## 2021-02-02 PROCEDURE — 88305 TISSUE EXAM BY PATHOLOGIST: ICD-10-PCS | Mod: 26,BMT,, | Performed by: PATHOLOGY

## 2021-02-02 PROCEDURE — 38222 DX BONE MARROW BX & ASPIR: CPT | Mod: HCNC,BMT,LT,S$GLB | Performed by: NURSE PRACTITIONER

## 2021-02-02 PROCEDURE — 88364 INSITU HYBRIDIZATION (FISH): CPT | Performed by: PATHOLOGY

## 2021-02-02 PROCEDURE — 99499 UNLISTED E&M SERVICE: CPT | Mod: HCNC,BMT,S$GLB, | Performed by: NURSE PRACTITIONER

## 2021-02-02 PROCEDURE — 88189 PR  FLOWCYTOMETRY/READ, 16 & > MARKERS: ICD-10-PCS | Mod: HCNC,BMT,, | Performed by: PATHOLOGY

## 2021-02-02 PROCEDURE — 88274 CYTOGENETICS 25-99: CPT

## 2021-02-02 PROCEDURE — 38222 PR BONE MARROW BIOPSY(IES) W/ASPIRATION(S); DIAGNOSTIC: ICD-10-PCS | Mod: HCNC,BMT,LT,S$GLB | Performed by: NURSE PRACTITIONER

## 2021-02-02 PROCEDURE — 96401 CHEMO ANTI-NEOPL SQ/IM: CPT | Mod: HCNC

## 2021-02-02 PROCEDURE — 88237 TISSUE CULTURE BONE MARROW: CPT | Mod: HCNC

## 2021-02-02 PROCEDURE — 85097 PR  BONE MARROW,SMEAR INTERPRETATION: ICD-10-PCS | Mod: BMT,,, | Performed by: PATHOLOGY

## 2021-02-02 PROCEDURE — 88189 FLOWCYTOMETRY/READ 16 & >: CPT | Mod: HCNC,BMT,, | Performed by: PATHOLOGY

## 2021-02-02 PROCEDURE — 88342 IMHCHEM/IMCYTCHM 1ST ANTB: CPT | Performed by: PATHOLOGY

## 2021-02-02 PROCEDURE — 85097 BONE MARROW INTERPRETATION: CPT | Mod: BMT,,, | Performed by: PATHOLOGY

## 2021-02-02 PROCEDURE — 88342 CHG IMMUNOCYTOCHEMISTRY: ICD-10-PCS | Mod: 26,59,BMT, | Performed by: PATHOLOGY

## 2021-02-02 PROCEDURE — 88365 INSITU HYBRIDIZATION (FISH): CPT | Mod: 26,BMT,, | Performed by: PATHOLOGY

## 2021-02-02 PROCEDURE — 88364 CHG INSITU HYBRIDIZATION (FISH: ICD-10-PCS | Mod: 26,BMT,, | Performed by: PATHOLOGY

## 2021-02-02 PROCEDURE — 63600175 PHARM REV CODE 636 W HCPCS: Mod: JG,HCNC | Performed by: INTERNAL MEDICINE

## 2021-02-02 PROCEDURE — 88185 FLOWCYTOMETRY/TC ADD-ON: CPT | Mod: HCNC | Performed by: PATHOLOGY

## 2021-02-02 PROCEDURE — 88313 SPECIAL STAINS GROUP 2: CPT | Mod: 26,BMT,, | Performed by: PATHOLOGY

## 2021-02-02 PROCEDURE — 99499 RISK ADDL DX/OHS AUDIT: ICD-10-PCS | Mod: HCNC,BMT,S$GLB, | Performed by: NURSE PRACTITIONER

## 2021-02-02 PROCEDURE — 88365 INSITU HYBRIDIZATION (FISH): CPT | Performed by: PATHOLOGY

## 2021-02-02 PROCEDURE — 88313 SPECIAL STAINS GROUP 2: CPT | Performed by: PATHOLOGY

## 2021-02-02 PROCEDURE — 88271 CYTOGENETICS DNA PROBE: CPT | Mod: 59

## 2021-02-02 PROCEDURE — 88311 DECALCIFY TISSUE: CPT | Performed by: PATHOLOGY

## 2021-02-02 PROCEDURE — 88311 PR  DECALCIFY TISSUE: ICD-10-PCS | Mod: 26,BMT,, | Performed by: PATHOLOGY

## 2021-02-02 PROCEDURE — 88311 DECALCIFY TISSUE: CPT | Mod: 26,BMT,, | Performed by: PATHOLOGY

## 2021-02-02 PROCEDURE — 88342 IMHCHEM/IMCYTCHM 1ST ANTB: CPT | Mod: 26,59,BMT, | Performed by: PATHOLOGY

## 2021-02-02 PROCEDURE — 88364 INSITU HYBRIDIZATION (FISH): CPT | Mod: 26,BMT,, | Performed by: PATHOLOGY

## 2021-02-02 PROCEDURE — 88271 CYTOGENETICS DNA PROBE: CPT | Mod: HCNC

## 2021-02-02 RX ORDER — SODIUM CHLORIDE 0.9 % (FLUSH) 0.9 %
10 SYRINGE (ML) INJECTION
Status: CANCELLED | OUTPATIENT
Start: 2021-02-16

## 2021-02-02 RX ORDER — BORTEZOMIB 3.5 MG/1
1 INJECTION, POWDER, LYOPHILIZED, FOR SOLUTION INTRAVENOUS; SUBCUTANEOUS
Status: COMPLETED | OUTPATIENT
Start: 2021-02-02 | End: 2021-02-02

## 2021-02-02 RX ORDER — BORTEZOMIB 3.5 MG/1
1 INJECTION, POWDER, LYOPHILIZED, FOR SOLUTION INTRAVENOUS; SUBCUTANEOUS
Status: CANCELLED | OUTPATIENT
Start: 2021-02-16

## 2021-02-02 RX ORDER — HEPARIN 100 UNIT/ML
500 SYRINGE INTRAVENOUS
Status: CANCELLED | OUTPATIENT
Start: 2021-02-16

## 2021-02-02 RX ORDER — BORTEZOMIB 3.5 MG/1
1 INJECTION, POWDER, LYOPHILIZED, FOR SOLUTION INTRAVENOUS; SUBCUTANEOUS
Status: CANCELLED | OUTPATIENT
Start: 2021-02-02

## 2021-02-02 RX ORDER — LIDOCAINE HYDROCHLORIDE 20 MG/ML
20 INJECTION, SOLUTION EPIDURAL; INFILTRATION; INTRACAUDAL; PERINEURAL ONCE
Status: COMPLETED | OUTPATIENT
Start: 2021-02-02 | End: 2021-02-02

## 2021-02-02 RX ADMIN — BORTEZOMIB 1.9 MG: 3.5 INJECTION, POWDER, LYOPHILIZED, FOR SOLUTION INTRAVENOUS; SUBCUTANEOUS at 10:02

## 2021-02-02 RX ADMIN — LIDOCAINE HYDROCHLORIDE 5 ML: 20 INJECTION, SOLUTION EPIDURAL; INFILTRATION; INTRACAUDAL; PERINEURAL at 08:02

## 2021-02-08 ENCOUNTER — CLINICAL SUPPORT (OUTPATIENT)
Dept: INFECTIOUS DISEASES | Facility: CLINIC | Age: 71
End: 2021-02-08
Payer: MEDICARE

## 2021-02-08 PROCEDURE — 90713 POLIOVIRUS VACCINE IPV SQ/IM: ICD-10-PCS | Mod: BMT,S$GLB,, | Performed by: INTERNAL MEDICINE

## 2021-02-08 PROCEDURE — 90472 HIB PRP-T CONJUGATE VACCINE 4 DOSE IM: ICD-10-PCS | Mod: BMT,S$GLB,, | Performed by: INTERNAL MEDICINE

## 2021-02-08 PROCEDURE — G0010 ADMIN HEPATITIS B VACCINE: HCPCS | Mod: 59,BMT,S$GLB, | Performed by: INTERNAL MEDICINE

## 2021-02-08 PROCEDURE — G0010 HEPATITIS B VACCINE ADULT IM: ICD-10-PCS | Mod: 59,BMT,S$GLB, | Performed by: INTERNAL MEDICINE

## 2021-02-08 PROCEDURE — 90670 PCV13 VACCINE IM: CPT | Mod: BMT,S$GLB,, | Performed by: INTERNAL MEDICINE

## 2021-02-08 PROCEDURE — G0009 ADMIN PNEUMOCOCCAL VACCINE: HCPCS | Mod: 59,BMT,S$GLB, | Performed by: INTERNAL MEDICINE

## 2021-02-08 PROCEDURE — G0009 DTAP (5 PERTUSSIS ANTIGENS) VACCINE LESS THAN 7YO IM: ICD-10-PCS | Mod: 59,BMT,S$GLB, | Performed by: INTERNAL MEDICINE

## 2021-02-08 PROCEDURE — 90746 HEPATITIS B VACCINE ADULT IM: ICD-10-PCS | Mod: BMT,S$GLB,, | Performed by: INTERNAL MEDICINE

## 2021-02-08 PROCEDURE — 90472 IMMUNIZATION ADMIN EACH ADD: CPT | Mod: BMT,S$GLB,, | Performed by: INTERNAL MEDICINE

## 2021-02-08 PROCEDURE — 90670 PNEUMOCOCCAL CONJUGATE VACCINE 13-VALENT LESS THAN 5YO & GREATER THAN: ICD-10-PCS | Mod: BMT,S$GLB,, | Performed by: INTERNAL MEDICINE

## 2021-02-08 PROCEDURE — 90700 DTAP VACCINE < 7 YRS IM: CPT | Mod: BMT,S$GLB,, | Performed by: INTERNAL MEDICINE

## 2021-02-08 PROCEDURE — 90471 IMMUNIZATION ADMIN: CPT | Mod: BMT,S$GLB,, | Performed by: INTERNAL MEDICINE

## 2021-02-08 PROCEDURE — 90746 HEPB VACCINE 3 DOSE ADULT IM: CPT | Mod: BMT,S$GLB,, | Performed by: INTERNAL MEDICINE

## 2021-02-08 PROCEDURE — 90471 PR IMMUNIZ ADMIN,1 SINGLE/COMB VAC/TOXOID: ICD-10-PCS | Mod: BMT,S$GLB,, | Performed by: INTERNAL MEDICINE

## 2021-02-08 PROCEDURE — 90648 HIB PRP-T CONJUGATE VACCINE 4 DOSE IM: ICD-10-PCS | Mod: BMT,S$GLB,, | Performed by: INTERNAL MEDICINE

## 2021-02-08 PROCEDURE — 90648 HIB PRP-T VACCINE 4 DOSE IM: CPT | Mod: BMT,S$GLB,, | Performed by: INTERNAL MEDICINE

## 2021-02-08 PROCEDURE — 90700 DTAP (5 PERTUSSIS ANTIGENS) VACCINE LESS THAN 7YO IM: ICD-10-PCS | Mod: BMT,S$GLB,, | Performed by: INTERNAL MEDICINE

## 2021-02-08 PROCEDURE — 90713 POLIOVIRUS IPV SC/IM: CPT | Mod: BMT,S$GLB,, | Performed by: INTERNAL MEDICINE

## 2021-02-10 LAB
COMMENT: NORMAL
FINAL PATHOLOGIC DIAGNOSIS: NORMAL
GROSS: NORMAL
Lab: NORMAL
MICROSCOPIC EXAM: NORMAL
SUPPLEMENTAL DIAGNOSIS: NORMAL

## 2021-02-15 ENCOUNTER — TELEPHONE (OUTPATIENT)
Dept: HEMATOLOGY/ONCOLOGY | Facility: CLINIC | Age: 71
End: 2021-02-15

## 2021-02-17 ENCOUNTER — OFFICE VISIT (OUTPATIENT)
Dept: HEMATOLOGY/ONCOLOGY | Facility: CLINIC | Age: 71
End: 2021-02-17
Payer: MEDICARE

## 2021-02-17 ENCOUNTER — LAB VISIT (OUTPATIENT)
Dept: LAB | Facility: HOSPITAL | Age: 71
End: 2021-02-17
Payer: MEDICARE

## 2021-02-17 ENCOUNTER — INFUSION (OUTPATIENT)
Dept: INFUSION THERAPY | Facility: HOSPITAL | Age: 71
End: 2021-02-17
Payer: MEDICARE

## 2021-02-17 VITALS
HEART RATE: 65 BPM | SYSTOLIC BLOOD PRESSURE: 141 MMHG | HEIGHT: 63 IN | DIASTOLIC BLOOD PRESSURE: 77 MMHG | TEMPERATURE: 98 F | BODY MASS INDEX: 31.68 KG/M2 | OXYGEN SATURATION: 95 % | WEIGHT: 178.81 LBS | RESPIRATION RATE: 12 BRPM

## 2021-02-17 VITALS
TEMPERATURE: 99 F | RESPIRATION RATE: 20 BRPM | SYSTOLIC BLOOD PRESSURE: 143 MMHG | HEART RATE: 68 BPM | DIASTOLIC BLOOD PRESSURE: 81 MMHG

## 2021-02-17 DIAGNOSIS — Z78.9 STATIN INTOLERANCE: ICD-10-CM

## 2021-02-17 DIAGNOSIS — C90.00 MULTIPLE MYELOMA, REMISSION STATUS UNSPECIFIED: ICD-10-CM

## 2021-02-17 DIAGNOSIS — F41.9 ANXIETY AND DEPRESSION: ICD-10-CM

## 2021-02-17 DIAGNOSIS — C90.01 MULTIPLE MYELOMA IN REMISSION: Primary | ICD-10-CM

## 2021-02-17 DIAGNOSIS — C90.00 MULTIPLE MYELOMA NOT HAVING ACHIEVED REMISSION: ICD-10-CM

## 2021-02-17 DIAGNOSIS — E78.00 PURE HYPERCHOLESTEROLEMIA: ICD-10-CM

## 2021-02-17 DIAGNOSIS — D64.81 ANEMIA DUE TO CHEMOTHERAPY: ICD-10-CM

## 2021-02-17 DIAGNOSIS — T45.1X5A ANEMIA DUE TO CHEMOTHERAPY: ICD-10-CM

## 2021-02-17 DIAGNOSIS — I71.9 DESCENDING AORTIC ANEURYSM: ICD-10-CM

## 2021-02-17 DIAGNOSIS — I69.30 HISTORY OF CVA WITH RESIDUAL DEFICIT: ICD-10-CM

## 2021-02-17 DIAGNOSIS — I10 ESSENTIAL HYPERTENSION: ICD-10-CM

## 2021-02-17 DIAGNOSIS — C90.01 MULTIPLE MYELOMA IN REMISSION: ICD-10-CM

## 2021-02-17 DIAGNOSIS — F33.42 RECURRENT MAJOR DEPRESSIVE DISORDER, IN FULL REMISSION: Primary | ICD-10-CM

## 2021-02-17 DIAGNOSIS — R06.00 DYSPNEA, UNSPECIFIED TYPE: ICD-10-CM

## 2021-02-17 DIAGNOSIS — Z94.84 HISTORY OF AUTO STEM CELL TRANSPLANT: ICD-10-CM

## 2021-02-17 DIAGNOSIS — F32.A ANXIETY AND DEPRESSION: ICD-10-CM

## 2021-02-17 DIAGNOSIS — G62.0 DRUG-INDUCED POLYNEUROPATHY: ICD-10-CM

## 2021-02-17 DIAGNOSIS — Z85.3 HISTORY OF LEFT BREAST CANCER: ICD-10-CM

## 2021-02-17 DIAGNOSIS — E11.9 TYPE 2 DIABETES MELLITUS WITHOUT COMPLICATION, WITHOUT LONG-TERM CURRENT USE OF INSULIN: ICD-10-CM

## 2021-02-17 LAB
ALBUMIN SERPL BCP-MCNC: 3.9 G/DL (ref 3.5–5.2)
ALP SERPL-CCNC: 78 U/L (ref 55–135)
ALT SERPL W/O P-5'-P-CCNC: 18 U/L (ref 10–44)
ANION GAP SERPL CALC-SCNC: 9 MMOL/L (ref 8–16)
AST SERPL-CCNC: 23 U/L (ref 10–40)
BASOPHILS # BLD AUTO: 0.04 K/UL (ref 0–0.2)
BASOPHILS NFR BLD: 1 % (ref 0–1.9)
BILIRUB SERPL-MCNC: 0.5 MG/DL (ref 0.1–1)
BUN SERPL-MCNC: 10 MG/DL (ref 8–23)
CALCIUM SERPL-MCNC: 9 MG/DL (ref 8.7–10.5)
CHLORIDE SERPL-SCNC: 106 MMOL/L (ref 95–110)
CO2 SERPL-SCNC: 27 MMOL/L (ref 23–29)
CREAT SERPL-MCNC: 0.9 MG/DL (ref 0.5–1.4)
DIFFERENTIAL METHOD: ABNORMAL
EOSINOPHIL # BLD AUTO: 0.1 K/UL (ref 0–0.5)
EOSINOPHIL NFR BLD: 2.8 % (ref 0–8)
ERYTHROCYTE [DISTWIDTH] IN BLOOD BY AUTOMATED COUNT: 15.9 % (ref 11.5–14.5)
EST. GFR  (AFRICAN AMERICAN): >60 ML/MIN/1.73 M^2
EST. GFR  (NON AFRICAN AMERICAN): >60 ML/MIN/1.73 M^2
GLUCOSE SERPL-MCNC: 106 MG/DL (ref 70–110)
HCT VFR BLD AUTO: 37.9 % (ref 37–48.5)
HGB BLD-MCNC: 11.9 G/DL (ref 12–16)
IGA SERPL-MCNC: 65 MG/DL (ref 40–350)
IGG SERPL-MCNC: 1285 MG/DL (ref 650–1600)
IGM SERPL-MCNC: 28 MG/DL (ref 50–300)
IMM GRANULOCYTES # BLD AUTO: 0.02 K/UL (ref 0–0.04)
IMM GRANULOCYTES NFR BLD AUTO: 0.5 % (ref 0–0.5)
LYMPHOCYTES # BLD AUTO: 0.7 K/UL (ref 1–4.8)
LYMPHOCYTES NFR BLD: 18.1 % (ref 18–48)
MCH RBC QN AUTO: 27 PG (ref 27–31)
MCHC RBC AUTO-ENTMCNC: 31.4 G/DL (ref 32–36)
MCV RBC AUTO: 86 FL (ref 82–98)
MONOCYTES # BLD AUTO: 0.4 K/UL (ref 0.3–1)
MONOCYTES NFR BLD: 10.2 % (ref 4–15)
NEUTROPHILS # BLD AUTO: 2.6 K/UL (ref 1.8–7.7)
NEUTROPHILS NFR BLD: 67.4 % (ref 38–73)
NRBC BLD-RTO: 0 /100 WBC
PLATELET # BLD AUTO: 185 K/UL (ref 150–350)
PMV BLD AUTO: 11.1 FL (ref 9.2–12.9)
POTASSIUM SERPL-SCNC: 3.7 MMOL/L (ref 3.5–5.1)
PROT SERPL-MCNC: 7.4 G/DL (ref 6–8.4)
RBC # BLD AUTO: 4.4 M/UL (ref 4–5.4)
SODIUM SERPL-SCNC: 142 MMOL/L (ref 136–145)
WBC # BLD AUTO: 3.92 K/UL (ref 3.9–12.7)

## 2021-02-17 PROCEDURE — 3044F HG A1C LEVEL LT 7.0%: CPT | Mod: BMT,CPTII,S$GLB, | Performed by: NURSE PRACTITIONER

## 2021-02-17 PROCEDURE — 3288F PR FALLS RISK ASSESSMENT DOCUMENTED: ICD-10-PCS | Mod: BMT,CPTII,S$GLB, | Performed by: NURSE PRACTITIONER

## 2021-02-17 PROCEDURE — 99999 PR PBB SHADOW E&M-EST. PATIENT-LVL IV: CPT | Mod: PBBFAC,BMT,, | Performed by: NURSE PRACTITIONER

## 2021-02-17 PROCEDURE — 1159F MED LIST DOCD IN RCRD: CPT | Mod: BMT,S$GLB,, | Performed by: NURSE PRACTITIONER

## 2021-02-17 PROCEDURE — 3044F PR MOST RECENT HEMOGLOBIN A1C LEVEL <7.0%: ICD-10-PCS | Mod: BMT,CPTII,S$GLB, | Performed by: NURSE PRACTITIONER

## 2021-02-17 PROCEDURE — 84165 PATHOLOGIST INTERPRETATION SPE: ICD-10-PCS | Mod: 26,BMT,, | Performed by: PATHOLOGY

## 2021-02-17 PROCEDURE — 3077F SYST BP >= 140 MM HG: CPT | Mod: BMT,CPTII,S$GLB, | Performed by: NURSE PRACTITIONER

## 2021-02-17 PROCEDURE — 3008F PR BODY MASS INDEX (BMI) DOCUMENTED: ICD-10-PCS | Mod: BMT,CPTII,S$GLB, | Performed by: NURSE PRACTITIONER

## 2021-02-17 PROCEDURE — 82784 ASSAY IGA/IGD/IGG/IGM EACH: CPT | Mod: 59

## 2021-02-17 PROCEDURE — 3072F PR LOW RISK FOR RETINOPATHY: ICD-10-PCS | Mod: BMT,S$GLB,, | Performed by: NURSE PRACTITIONER

## 2021-02-17 PROCEDURE — 99215 PR OFFICE/OUTPT VISIT, EST, LEVL V, 40-54 MIN: ICD-10-PCS | Mod: BMT,S$GLB,, | Performed by: NURSE PRACTITIONER

## 2021-02-17 PROCEDURE — 99999 PR PBB SHADOW E&M-EST. PATIENT-LVL IV: ICD-10-PCS | Mod: PBBFAC,BMT,, | Performed by: NURSE PRACTITIONER

## 2021-02-17 PROCEDURE — 84165 PROTEIN E-PHORESIS SERUM: CPT

## 2021-02-17 PROCEDURE — 3288F FALL RISK ASSESSMENT DOCD: CPT | Mod: BMT,CPTII,S$GLB, | Performed by: NURSE PRACTITIONER

## 2021-02-17 PROCEDURE — 86334 IMMUNOFIX E-PHORESIS SERUM: CPT

## 2021-02-17 PROCEDURE — 86334 PATHOLOGIST INTERPRETATION IFE: ICD-10-PCS | Mod: 26,BMT,, | Performed by: PATHOLOGY

## 2021-02-17 PROCEDURE — 63600175 PHARM REV CODE 636 W HCPCS: Mod: JG | Performed by: INTERNAL MEDICINE

## 2021-02-17 PROCEDURE — 80053 COMPREHEN METABOLIC PANEL: CPT

## 2021-02-17 PROCEDURE — 3008F BODY MASS INDEX DOCD: CPT | Mod: BMT,CPTII,S$GLB, | Performed by: NURSE PRACTITIONER

## 2021-02-17 PROCEDURE — 83520 IMMUNOASSAY QUANT NOS NONAB: CPT

## 2021-02-17 PROCEDURE — 3077F PR MOST RECENT SYSTOLIC BLOOD PRESSURE >= 140 MM HG: ICD-10-PCS | Mod: BMT,CPTII,S$GLB, | Performed by: NURSE PRACTITIONER

## 2021-02-17 PROCEDURE — 1126F PR PAIN SEVERITY QUANTIFIED, NO PAIN PRESENT: ICD-10-PCS | Mod: BMT,S$GLB,, | Performed by: NURSE PRACTITIONER

## 2021-02-17 PROCEDURE — 1101F PT FALLS ASSESS-DOCD LE1/YR: CPT | Mod: BMT,CPTII,S$GLB, | Performed by: NURSE PRACTITIONER

## 2021-02-17 PROCEDURE — 84165 PROTEIN E-PHORESIS SERUM: CPT | Mod: 26,BMT,, | Performed by: PATHOLOGY

## 2021-02-17 PROCEDURE — 3078F DIAST BP <80 MM HG: CPT | Mod: BMT,CPTII,S$GLB, | Performed by: NURSE PRACTITIONER

## 2021-02-17 PROCEDURE — 3078F PR MOST RECENT DIASTOLIC BLOOD PRESSURE < 80 MM HG: ICD-10-PCS | Mod: BMT,CPTII,S$GLB, | Performed by: NURSE PRACTITIONER

## 2021-02-17 PROCEDURE — 99215 OFFICE O/P EST HI 40 MIN: CPT | Mod: BMT,S$GLB,, | Performed by: NURSE PRACTITIONER

## 2021-02-17 PROCEDURE — 3072F LOW RISK FOR RETINOPATHY: CPT | Mod: BMT,S$GLB,, | Performed by: NURSE PRACTITIONER

## 2021-02-17 PROCEDURE — 1159F PR MEDICATION LIST DOCUMENTED IN MEDICAL RECORD: ICD-10-PCS | Mod: BMT,S$GLB,, | Performed by: NURSE PRACTITIONER

## 2021-02-17 PROCEDURE — 36415 COLL VENOUS BLD VENIPUNCTURE: CPT

## 2021-02-17 PROCEDURE — 85025 COMPLETE CBC W/AUTO DIFF WBC: CPT

## 2021-02-17 PROCEDURE — 96401 CHEMO ANTI-NEOPL SQ/IM: CPT

## 2021-02-17 PROCEDURE — 86334 IMMUNOFIX E-PHORESIS SERUM: CPT | Mod: 26,BMT,, | Performed by: PATHOLOGY

## 2021-02-17 PROCEDURE — 1126F AMNT PAIN NOTED NONE PRSNT: CPT | Mod: BMT,S$GLB,, | Performed by: NURSE PRACTITIONER

## 2021-02-17 PROCEDURE — 1101F PR PT FALLS ASSESS DOC 0-1 FALLS W/OUT INJ PAST YR: ICD-10-PCS | Mod: BMT,CPTII,S$GLB, | Performed by: NURSE PRACTITIONER

## 2021-02-17 RX ORDER — GABAPENTIN 300 MG/1
600 CAPSULE ORAL 2 TIMES DAILY
Qty: 90 CAPSULE | Refills: 5
Start: 2021-02-17 | End: 2021-03-22 | Stop reason: SDUPTHER

## 2021-02-17 RX ORDER — HEPARIN 100 UNIT/ML
500 SYRINGE INTRAVENOUS
Status: DISCONTINUED | OUTPATIENT
Start: 2021-02-17 | End: 2021-02-17 | Stop reason: HOSPADM

## 2021-02-17 RX ORDER — SERTRALINE HYDROCHLORIDE 50 MG/1
50 TABLET, FILM COATED ORAL DAILY
Qty: 90 TABLET | Refills: 3
Start: 2021-02-17 | End: 2021-04-07 | Stop reason: SDUPTHER

## 2021-02-17 RX ORDER — SODIUM CHLORIDE 0.9 % (FLUSH) 0.9 %
10 SYRINGE (ML) INJECTION
Status: DISCONTINUED | OUTPATIENT
Start: 2021-02-17 | End: 2021-02-17 | Stop reason: HOSPADM

## 2021-02-17 RX ORDER — BORTEZOMIB 3.5 MG/1
1 INJECTION, POWDER, LYOPHILIZED, FOR SOLUTION INTRAVENOUS; SUBCUTANEOUS
Status: COMPLETED | OUTPATIENT
Start: 2021-02-17 | End: 2021-02-17

## 2021-02-17 RX ADMIN — BORTEZOMIB 1.9 MG: 3.5 INJECTION, POWDER, LYOPHILIZED, FOR SOLUTION INTRAVENOUS; SUBCUTANEOUS at 04:02

## 2021-02-18 LAB
ALBUMIN SERPL ELPH-MCNC: 4.2 G/DL (ref 3.35–5.55)
ALPHA1 GLOB SERPL ELPH-MCNC: 0.29 G/DL (ref 0.17–0.41)
ALPHA2 GLOB SERPL ELPH-MCNC: 0.92 G/DL (ref 0.43–0.99)
B-GLOBULIN SERPL ELPH-MCNC: 0.73 G/DL (ref 0.5–1.1)
GAMMA GLOB SERPL ELPH-MCNC: 1.16 G/DL (ref 0.67–1.58)
INTERPRETATION SERPL IFE-IMP: NORMAL
KAPPA LC SER QL IA: 2.79 MG/DL (ref 0.33–1.94)
KAPPA LC/LAMBDA SER IA: 1.78 (ref 0.26–1.65)
LAMBDA LC SER QL IA: 1.57 MG/DL (ref 0.57–2.63)
PATHOLOGIST INTERPRETATION IFE: NORMAL
PATHOLOGIST INTERPRETATION SPE: NORMAL
PROT SERPL-MCNC: 7.3 G/DL (ref 6–8.4)

## 2021-03-04 ENCOUNTER — INFUSION (OUTPATIENT)
Dept: INFUSION THERAPY | Facility: HOSPITAL | Age: 71
End: 2021-03-04
Payer: MEDICARE

## 2021-03-04 VITALS
SYSTOLIC BLOOD PRESSURE: 161 MMHG | WEIGHT: 181.44 LBS | BODY MASS INDEX: 32.15 KG/M2 | DIASTOLIC BLOOD PRESSURE: 78 MMHG | TEMPERATURE: 98 F | HEART RATE: 58 BPM | HEIGHT: 63 IN | RESPIRATION RATE: 18 BRPM

## 2021-03-04 DIAGNOSIS — Z94.81 STATUS POST AUTOLOGOUS BONE MARROW TRANSPLANT: Primary | ICD-10-CM

## 2021-03-04 DIAGNOSIS — C90.00 METASTATIC MULTIPLE MYELOMA TO BONE: ICD-10-CM

## 2021-03-04 DIAGNOSIS — C90.01 MULTIPLE MYELOMA IN REMISSION: ICD-10-CM

## 2021-03-04 DIAGNOSIS — C90.00 MULTIPLE MYELOMA NOT HAVING ACHIEVED REMISSION: ICD-10-CM

## 2021-03-04 PROCEDURE — 25000003 PHARM REV CODE 250: Performed by: INTERNAL MEDICINE

## 2021-03-04 PROCEDURE — 96401 CHEMO ANTI-NEOPL SQ/IM: CPT

## 2021-03-04 PROCEDURE — 63600175 PHARM REV CODE 636 W HCPCS: Performed by: INTERNAL MEDICINE

## 2021-03-04 PROCEDURE — 96365 THER/PROPH/DIAG IV INF INIT: CPT

## 2021-03-04 RX ORDER — HEPARIN 100 UNIT/ML
500 SYRINGE INTRAVENOUS
Status: DISCONTINUED | OUTPATIENT
Start: 2021-03-04 | End: 2021-03-04 | Stop reason: HOSPADM

## 2021-03-04 RX ORDER — BORTEZOMIB 3.5 MG/1
1 INJECTION, POWDER, LYOPHILIZED, FOR SOLUTION INTRAVENOUS; SUBCUTANEOUS
Status: COMPLETED | OUTPATIENT
Start: 2021-03-04 | End: 2021-03-04

## 2021-03-04 RX ORDER — SODIUM CHLORIDE 0.9 % (FLUSH) 0.9 %
10 SYRINGE (ML) INJECTION
Status: CANCELLED | OUTPATIENT
Start: 2021-03-04

## 2021-03-04 RX ORDER — BORTEZOMIB 3.5 MG/1
1 INJECTION, POWDER, LYOPHILIZED, FOR SOLUTION INTRAVENOUS; SUBCUTANEOUS
Status: CANCELLED | OUTPATIENT
Start: 2021-03-04

## 2021-03-04 RX ORDER — ZOLEDRONIC ACID 0.04 MG/ML
4 INJECTION, SOLUTION INTRAVENOUS
Status: CANCELLED | OUTPATIENT
Start: 2021-03-04

## 2021-03-04 RX ORDER — SODIUM CHLORIDE 0.9 % (FLUSH) 0.9 %
10 SYRINGE (ML) INJECTION
Status: DISCONTINUED | OUTPATIENT
Start: 2021-03-04 | End: 2021-03-04 | Stop reason: HOSPADM

## 2021-03-04 RX ORDER — HEPARIN 100 UNIT/ML
500 SYRINGE INTRAVENOUS
Status: CANCELLED | OUTPATIENT
Start: 2021-03-04

## 2021-03-04 RX ORDER — ZOLEDRONIC ACID 0.04 MG/ML
4 INJECTION, SOLUTION INTRAVENOUS
Status: COMPLETED | OUTPATIENT
Start: 2021-03-04 | End: 2021-03-04

## 2021-03-04 RX ADMIN — ZOLEDRONIC ACID 4 MG: 0.04 INJECTION, SOLUTION INTRAVENOUS at 08:03

## 2021-03-04 RX ADMIN — BORTEZOMIB 1.9 MG: 3.5 INJECTION, POWDER, LYOPHILIZED, FOR SOLUTION INTRAVENOUS; SUBCUTANEOUS at 09:03

## 2021-03-04 RX ADMIN — SODIUM CHLORIDE: 9 INJECTION, SOLUTION INTRAVENOUS at 08:03

## 2021-03-17 ENCOUNTER — IMMUNIZATION (OUTPATIENT)
Dept: PHARMACY | Facility: CLINIC | Age: 71
End: 2021-03-17
Payer: MEDICARE

## 2021-03-17 DIAGNOSIS — Z23 NEED FOR VACCINATION: Primary | ICD-10-CM

## 2021-03-18 ENCOUNTER — INFUSION (OUTPATIENT)
Dept: INFUSION THERAPY | Facility: HOSPITAL | Age: 71
End: 2021-03-18
Payer: MEDICARE

## 2021-03-18 ENCOUNTER — OFFICE VISIT (OUTPATIENT)
Dept: HEMATOLOGY/ONCOLOGY | Facility: CLINIC | Age: 71
End: 2021-03-18
Payer: MEDICARE

## 2021-03-18 VITALS
SYSTOLIC BLOOD PRESSURE: 138 MMHG | RESPIRATION RATE: 16 BRPM | HEART RATE: 60 BPM | DIASTOLIC BLOOD PRESSURE: 74 MMHG | BODY MASS INDEX: 31.87 KG/M2 | OXYGEN SATURATION: 96 % | HEIGHT: 63 IN | WEIGHT: 179.88 LBS

## 2021-03-18 DIAGNOSIS — C90.00 MULTIPLE MYELOMA NOT HAVING ACHIEVED REMISSION: ICD-10-CM

## 2021-03-18 DIAGNOSIS — Z94.84 HISTORY OF AUTO STEM CELL TRANSPLANT: ICD-10-CM

## 2021-03-18 DIAGNOSIS — C90.01 MULTIPLE MYELOMA IN REMISSION: Primary | ICD-10-CM

## 2021-03-18 DIAGNOSIS — G62.9 PERIPHERAL POLYNEUROPATHY: ICD-10-CM

## 2021-03-18 PROCEDURE — 1126F PR PAIN SEVERITY QUANTIFIED, NO PAIN PRESENT: ICD-10-PCS | Mod: BMT,S$GLB,, | Performed by: INTERNAL MEDICINE

## 2021-03-18 PROCEDURE — 3078F PR MOST RECENT DIASTOLIC BLOOD PRESSURE < 80 MM HG: ICD-10-PCS | Mod: BMT,CPTII,S$GLB, | Performed by: INTERNAL MEDICINE

## 2021-03-18 PROCEDURE — 3008F PR BODY MASS INDEX (BMI) DOCUMENTED: ICD-10-PCS | Mod: BMT,CPTII,S$GLB, | Performed by: INTERNAL MEDICINE

## 2021-03-18 PROCEDURE — 3075F SYST BP GE 130 - 139MM HG: CPT | Mod: BMT,CPTII,S$GLB, | Performed by: INTERNAL MEDICINE

## 2021-03-18 PROCEDURE — 63600175 PHARM REV CODE 636 W HCPCS: Mod: JG | Performed by: INTERNAL MEDICINE

## 2021-03-18 PROCEDURE — 3078F DIAST BP <80 MM HG: CPT | Mod: BMT,CPTII,S$GLB, | Performed by: INTERNAL MEDICINE

## 2021-03-18 PROCEDURE — 3072F PR LOW RISK FOR RETINOPATHY: ICD-10-PCS | Mod: BMT,S$GLB,, | Performed by: INTERNAL MEDICINE

## 2021-03-18 PROCEDURE — 99214 OFFICE O/P EST MOD 30 MIN: CPT | Mod: BMT,S$GLB,, | Performed by: INTERNAL MEDICINE

## 2021-03-18 PROCEDURE — 1101F PR PT FALLS ASSESS DOC 0-1 FALLS W/OUT INJ PAST YR: ICD-10-PCS | Mod: BMT,CPTII,S$GLB, | Performed by: INTERNAL MEDICINE

## 2021-03-18 PROCEDURE — 3288F FALL RISK ASSESSMENT DOCD: CPT | Mod: BMT,CPTII,S$GLB, | Performed by: INTERNAL MEDICINE

## 2021-03-18 PROCEDURE — 3072F LOW RISK FOR RETINOPATHY: CPT | Mod: BMT,S$GLB,, | Performed by: INTERNAL MEDICINE

## 2021-03-18 PROCEDURE — 1101F PT FALLS ASSESS-DOCD LE1/YR: CPT | Mod: BMT,CPTII,S$GLB, | Performed by: INTERNAL MEDICINE

## 2021-03-18 PROCEDURE — 99999 PR PBB SHADOW E&M-EST. PATIENT-LVL III: ICD-10-PCS | Mod: PBBFAC,BMT,, | Performed by: INTERNAL MEDICINE

## 2021-03-18 PROCEDURE — 1126F AMNT PAIN NOTED NONE PRSNT: CPT | Mod: BMT,S$GLB,, | Performed by: INTERNAL MEDICINE

## 2021-03-18 PROCEDURE — 3075F PR MOST RECENT SYSTOLIC BLOOD PRESS GE 130-139MM HG: ICD-10-PCS | Mod: BMT,CPTII,S$GLB, | Performed by: INTERNAL MEDICINE

## 2021-03-18 PROCEDURE — 99999 PR PBB SHADOW E&M-EST. PATIENT-LVL III: CPT | Mod: PBBFAC,BMT,, | Performed by: INTERNAL MEDICINE

## 2021-03-18 PROCEDURE — 1159F MED LIST DOCD IN RCRD: CPT | Mod: BMT,S$GLB,, | Performed by: INTERNAL MEDICINE

## 2021-03-18 PROCEDURE — 3288F PR FALLS RISK ASSESSMENT DOCUMENTED: ICD-10-PCS | Mod: BMT,CPTII,S$GLB, | Performed by: INTERNAL MEDICINE

## 2021-03-18 PROCEDURE — 1159F PR MEDICATION LIST DOCUMENTED IN MEDICAL RECORD: ICD-10-PCS | Mod: BMT,S$GLB,, | Performed by: INTERNAL MEDICINE

## 2021-03-18 PROCEDURE — 99214 PR OFFICE/OUTPT VISIT, EST, LEVL IV, 30-39 MIN: ICD-10-PCS | Mod: BMT,S$GLB,, | Performed by: INTERNAL MEDICINE

## 2021-03-18 PROCEDURE — 3008F BODY MASS INDEX DOCD: CPT | Mod: BMT,CPTII,S$GLB, | Performed by: INTERNAL MEDICINE

## 2021-03-18 PROCEDURE — 96401 CHEMO ANTI-NEOPL SQ/IM: CPT

## 2021-03-18 RX ORDER — BORTEZOMIB 3.5 MG/1
1 INJECTION, POWDER, LYOPHILIZED, FOR SOLUTION INTRAVENOUS; SUBCUTANEOUS
Status: CANCELLED | OUTPATIENT
Start: 2021-04-27

## 2021-03-18 RX ORDER — HEPARIN 100 UNIT/ML
500 SYRINGE INTRAVENOUS
Status: CANCELLED | OUTPATIENT
Start: 2021-04-13

## 2021-03-18 RX ORDER — SODIUM CHLORIDE 0.9 % (FLUSH) 0.9 %
10 SYRINGE (ML) INJECTION
Status: CANCELLED | OUTPATIENT
Start: 2021-04-27

## 2021-03-18 RX ORDER — SODIUM CHLORIDE 0.9 % (FLUSH) 0.9 %
10 SYRINGE (ML) INJECTION
Status: CANCELLED | OUTPATIENT
Start: 2021-04-13

## 2021-03-18 RX ORDER — SODIUM CHLORIDE 0.9 % (FLUSH) 0.9 %
10 SYRINGE (ML) INJECTION
Status: CANCELLED | OUTPATIENT
Start: 2021-05-11

## 2021-03-18 RX ORDER — HEPARIN 100 UNIT/ML
500 SYRINGE INTRAVENOUS
Status: CANCELLED | OUTPATIENT
Start: 2021-03-30

## 2021-03-18 RX ORDER — HEPARIN 100 UNIT/ML
500 SYRINGE INTRAVENOUS
Status: CANCELLED | OUTPATIENT
Start: 2021-05-11

## 2021-03-18 RX ORDER — HEPARIN 100 UNIT/ML
500 SYRINGE INTRAVENOUS
Status: CANCELLED | OUTPATIENT
Start: 2021-03-18

## 2021-03-18 RX ORDER — BORTEZOMIB 3.5 MG/1
1 INJECTION, POWDER, LYOPHILIZED, FOR SOLUTION INTRAVENOUS; SUBCUTANEOUS
Status: CANCELLED | OUTPATIENT
Start: 2021-03-18

## 2021-03-18 RX ORDER — BORTEZOMIB 3.5 MG/1
1 INJECTION, POWDER, LYOPHILIZED, FOR SOLUTION INTRAVENOUS; SUBCUTANEOUS
Status: CANCELLED | OUTPATIENT
Start: 2021-04-13

## 2021-03-18 RX ORDER — BORTEZOMIB 3.5 MG/1
1 INJECTION, POWDER, LYOPHILIZED, FOR SOLUTION INTRAVENOUS; SUBCUTANEOUS
Status: COMPLETED | OUTPATIENT
Start: 2021-03-18 | End: 2021-03-18

## 2021-03-18 RX ORDER — BORTEZOMIB 3.5 MG/1
1 INJECTION, POWDER, LYOPHILIZED, FOR SOLUTION INTRAVENOUS; SUBCUTANEOUS
Status: CANCELLED | OUTPATIENT
Start: 2021-03-30

## 2021-03-18 RX ORDER — SODIUM CHLORIDE 0.9 % (FLUSH) 0.9 %
10 SYRINGE (ML) INJECTION
Status: CANCELLED | OUTPATIENT
Start: 2021-03-18

## 2021-03-18 RX ORDER — SODIUM CHLORIDE 0.9 % (FLUSH) 0.9 %
10 SYRINGE (ML) INJECTION
Status: CANCELLED | OUTPATIENT
Start: 2021-03-30

## 2021-03-18 RX ORDER — HEPARIN 100 UNIT/ML
500 SYRINGE INTRAVENOUS
Status: CANCELLED | OUTPATIENT
Start: 2021-04-27

## 2021-03-18 RX ORDER — BORTEZOMIB 3.5 MG/1
1 INJECTION, POWDER, LYOPHILIZED, FOR SOLUTION INTRAVENOUS; SUBCUTANEOUS
Status: CANCELLED | OUTPATIENT
Start: 2021-05-11

## 2021-03-18 RX ADMIN — BORTEZOMIB 1.9 MG: 3.5 INJECTION, POWDER, LYOPHILIZED, FOR SOLUTION INTRAVENOUS; SUBCUTANEOUS at 04:03

## 2021-03-23 ENCOUNTER — SPECIALTY PHARMACY (OUTPATIENT)
Dept: PHARMACY | Facility: CLINIC | Age: 71
End: 2021-03-23

## 2021-03-29 ENCOUNTER — LAB VISIT (OUTPATIENT)
Dept: LAB | Facility: HOSPITAL | Age: 71
End: 2021-03-29
Attending: INTERNAL MEDICINE
Payer: MEDICARE

## 2021-03-29 DIAGNOSIS — Z94.84 HISTORY OF AUTO STEM CELL TRANSPLANT: ICD-10-CM

## 2021-03-29 DIAGNOSIS — C90.00 MULTIPLE MYELOMA, REMISSION STATUS UNSPECIFIED: ICD-10-CM

## 2021-03-29 LAB — HBV SURFACE AB SER-ACNC: NEGATIVE M[IU]/ML

## 2021-03-29 PROCEDURE — 36415 COLL VENOUS BLD VENIPUNCTURE: CPT | Performed by: INTERNAL MEDICINE

## 2021-03-29 PROCEDURE — 86774 TETANUS ANTIBODY: CPT | Performed by: INTERNAL MEDICINE

## 2021-03-29 PROCEDURE — 86317 IMMUNOASSAY INFECTIOUS AGENT: CPT | Performed by: INTERNAL MEDICINE

## 2021-03-29 PROCEDURE — 86706 HEP B SURFACE ANTIBODY: CPT | Performed by: INTERNAL MEDICINE

## 2021-03-31 ENCOUNTER — OFFICE VISIT (OUTPATIENT)
Dept: HOME HEALTH SERVICES | Facility: CLINIC | Age: 71
End: 2021-03-31
Payer: MEDICARE

## 2021-03-31 VITALS
HEART RATE: 68 BPM | DIASTOLIC BLOOD PRESSURE: 85 MMHG | BODY MASS INDEX: 31.54 KG/M2 | OXYGEN SATURATION: 98 % | WEIGHT: 178 LBS | TEMPERATURE: 98 F | SYSTOLIC BLOOD PRESSURE: 136 MMHG | HEIGHT: 63 IN

## 2021-03-31 DIAGNOSIS — I27.9 PULMONARY HEART DISEASE: ICD-10-CM

## 2021-03-31 DIAGNOSIS — C90.01 MULTIPLE MYELOMA IN REMISSION: ICD-10-CM

## 2021-03-31 DIAGNOSIS — J84.9 INTERSTITIAL LUNG DISEASE: ICD-10-CM

## 2021-03-31 DIAGNOSIS — I71.20 THORACIC AORTIC ANEURYSM WITHOUT RUPTURE: ICD-10-CM

## 2021-03-31 DIAGNOSIS — Z00.00 ENCOUNTER FOR PREVENTIVE HEALTH EXAMINATION: Primary | ICD-10-CM

## 2021-03-31 DIAGNOSIS — Z99.89 DEPENDENCE ON OTHER ENABLING MACHINES AND DEVICES: ICD-10-CM

## 2021-03-31 DIAGNOSIS — I70.0 AORTIC ATHEROSCLEROSIS: ICD-10-CM

## 2021-03-31 DIAGNOSIS — E11.51 TYPE 2 DIABETES MELLITUS WITH DIABETIC PERIPHERAL ANGIOPATHY WITHOUT GANGRENE, WITH LONG-TERM CURRENT USE OF INSULIN: ICD-10-CM

## 2021-03-31 DIAGNOSIS — Z74.09 OTHER REDUCED MOBILITY: ICD-10-CM

## 2021-03-31 DIAGNOSIS — F33.42 RECURRENT MAJOR DEPRESSIVE DISORDER, IN FULL REMISSION: ICD-10-CM

## 2021-03-31 DIAGNOSIS — I69.351 HEMIPLEGIA AND HEMIPARESIS FOLLOWING CEREBRAL INFARCTION AFFECTING RIGHT DOMINANT SIDE: ICD-10-CM

## 2021-03-31 DIAGNOSIS — Z94.84 HISTORY OF AUTO STEM CELL TRANSPLANT: ICD-10-CM

## 2021-03-31 DIAGNOSIS — Z79.4 TYPE 2 DIABETES MELLITUS WITH DIABETIC PERIPHERAL ANGIOPATHY WITHOUT GANGRENE, WITH LONG-TERM CURRENT USE OF INSULIN: ICD-10-CM

## 2021-03-31 DIAGNOSIS — I10 ESSENTIAL HYPERTENSION: ICD-10-CM

## 2021-03-31 DIAGNOSIS — Z78.0 POSTMENOPAUSAL STATE: ICD-10-CM

## 2021-03-31 DIAGNOSIS — E78.00 PURE HYPERCHOLESTEROLEMIA: ICD-10-CM

## 2021-03-31 PROBLEM — D61.810 PANCYTOPENIA DUE TO ANTINEOPLASTIC CHEMOTHERAPY: Status: RESOLVED | Noted: 2020-02-10 | Resolved: 2021-03-31

## 2021-03-31 PROBLEM — T45.1X5A PANCYTOPENIA DUE TO ANTINEOPLASTIC CHEMOTHERAPY: Status: RESOLVED | Noted: 2020-02-10 | Resolved: 2021-03-31

## 2021-03-31 PROCEDURE — 3075F SYST BP GE 130 - 139MM HG: CPT | Mod: CPTII,S$GLB,, | Performed by: NURSE PRACTITIONER

## 2021-03-31 PROCEDURE — 1101F PR PT FALLS ASSESS DOC 0-1 FALLS W/OUT INJ PAST YR: ICD-10-PCS | Mod: CPTII,S$GLB,, | Performed by: NURSE PRACTITIONER

## 2021-03-31 PROCEDURE — 1126F PR PAIN SEVERITY QUANTIFIED, NO PAIN PRESENT: ICD-10-PCS | Mod: S$GLB,,, | Performed by: NURSE PRACTITIONER

## 2021-03-31 PROCEDURE — G0439 PPPS, SUBSEQ VISIT: HCPCS | Mod: S$GLB,,, | Performed by: NURSE PRACTITIONER

## 2021-03-31 PROCEDURE — 99499 RISK ADDL DX/OHS AUDIT: ICD-10-PCS | Mod: S$GLB,,, | Performed by: NURSE PRACTITIONER

## 2021-03-31 PROCEDURE — 1101F PT FALLS ASSESS-DOCD LE1/YR: CPT | Mod: CPTII,S$GLB,, | Performed by: NURSE PRACTITIONER

## 2021-03-31 PROCEDURE — 3072F PR LOW RISK FOR RETINOPATHY: ICD-10-PCS | Mod: S$GLB,,, | Performed by: NURSE PRACTITIONER

## 2021-03-31 PROCEDURE — 3008F PR BODY MASS INDEX (BMI) DOCUMENTED: ICD-10-PCS | Mod: CPTII,S$GLB,, | Performed by: NURSE PRACTITIONER

## 2021-03-31 PROCEDURE — 3079F PR MOST RECENT DIASTOLIC BLOOD PRESSURE 80-89 MM HG: ICD-10-PCS | Mod: CPTII,S$GLB,, | Performed by: NURSE PRACTITIONER

## 2021-03-31 PROCEDURE — 3044F HG A1C LEVEL LT 7.0%: CPT | Mod: CPTII,S$GLB,, | Performed by: NURSE PRACTITIONER

## 2021-03-31 PROCEDURE — 3008F BODY MASS INDEX DOCD: CPT | Mod: CPTII,S$GLB,, | Performed by: NURSE PRACTITIONER

## 2021-03-31 PROCEDURE — 3288F PR FALLS RISK ASSESSMENT DOCUMENTED: ICD-10-PCS | Mod: CPTII,S$GLB,, | Performed by: NURSE PRACTITIONER

## 2021-03-31 PROCEDURE — 1158F ADVNC CARE PLAN TLK DOCD: CPT | Mod: S$GLB,,, | Performed by: NURSE PRACTITIONER

## 2021-03-31 PROCEDURE — 3079F DIAST BP 80-89 MM HG: CPT | Mod: CPTII,S$GLB,, | Performed by: NURSE PRACTITIONER

## 2021-03-31 PROCEDURE — 3044F PR MOST RECENT HEMOGLOBIN A1C LEVEL <7.0%: ICD-10-PCS | Mod: CPTII,S$GLB,, | Performed by: NURSE PRACTITIONER

## 2021-03-31 PROCEDURE — 3072F LOW RISK FOR RETINOPATHY: CPT | Mod: S$GLB,,, | Performed by: NURSE PRACTITIONER

## 2021-03-31 PROCEDURE — 99499 UNLISTED E&M SERVICE: CPT | Mod: S$GLB,,, | Performed by: NURSE PRACTITIONER

## 2021-03-31 PROCEDURE — 3075F PR MOST RECENT SYSTOLIC BLOOD PRESS GE 130-139MM HG: ICD-10-PCS | Mod: CPTII,S$GLB,, | Performed by: NURSE PRACTITIONER

## 2021-03-31 PROCEDURE — 3288F FALL RISK ASSESSMENT DOCD: CPT | Mod: CPTII,S$GLB,, | Performed by: NURSE PRACTITIONER

## 2021-03-31 PROCEDURE — 1126F AMNT PAIN NOTED NONE PRSNT: CPT | Mod: S$GLB,,, | Performed by: NURSE PRACTITIONER

## 2021-03-31 PROCEDURE — 1158F PR ADVANCE CARE PLANNING DISCUSS DOCUMENTED IN MEDICAL RECORD: ICD-10-PCS | Mod: S$GLB,,, | Performed by: NURSE PRACTITIONER

## 2021-03-31 PROCEDURE — G0439 PR MEDICARE ANNUAL WELLNESS SUBSEQUENT VISIT: ICD-10-PCS | Mod: S$GLB,,, | Performed by: NURSE PRACTITIONER

## 2021-04-01 ENCOUNTER — INFUSION (OUTPATIENT)
Dept: INFUSION THERAPY | Facility: HOSPITAL | Age: 71
End: 2021-04-01
Payer: MEDICARE

## 2021-04-01 VITALS
TEMPERATURE: 99 F | RESPIRATION RATE: 16 BRPM | HEART RATE: 62 BPM | BODY MASS INDEX: 31.54 KG/M2 | SYSTOLIC BLOOD PRESSURE: 133 MMHG | DIASTOLIC BLOOD PRESSURE: 79 MMHG | WEIGHT: 178 LBS | HEIGHT: 63 IN

## 2021-04-01 DIAGNOSIS — C90.00 MULTIPLE MYELOMA NOT HAVING ACHIEVED REMISSION: ICD-10-CM

## 2021-04-01 DIAGNOSIS — C90.01 MULTIPLE MYELOMA IN REMISSION: Primary | ICD-10-CM

## 2021-04-01 DIAGNOSIS — Z94.81 STATUS POST AUTOLOGOUS BONE MARROW TRANSPLANT: ICD-10-CM

## 2021-04-01 PROCEDURE — A4216 STERILE WATER/SALINE, 10 ML: HCPCS | Performed by: INTERNAL MEDICINE

## 2021-04-01 PROCEDURE — 96375 TX/PRO/DX INJ NEW DRUG ADDON: CPT

## 2021-04-01 PROCEDURE — 25000003 PHARM REV CODE 250: Performed by: INTERNAL MEDICINE

## 2021-04-01 PROCEDURE — 96401 CHEMO ANTI-NEOPL SQ/IM: CPT

## 2021-04-01 PROCEDURE — 63600175 PHARM REV CODE 636 W HCPCS: Performed by: INTERNAL MEDICINE

## 2021-04-01 PROCEDURE — 96374 THER/PROPH/DIAG INJ IV PUSH: CPT

## 2021-04-01 RX ORDER — ZOLEDRONIC ACID 0.04 MG/ML
4 INJECTION, SOLUTION INTRAVENOUS
Status: COMPLETED | OUTPATIENT
Start: 2021-04-01 | End: 2021-04-01

## 2021-04-01 RX ORDER — SODIUM CHLORIDE 0.9 % (FLUSH) 0.9 %
10 SYRINGE (ML) INJECTION
Status: CANCELLED | OUTPATIENT
Start: 2021-05-16

## 2021-04-01 RX ORDER — BORTEZOMIB 3.5 MG/1
1 INJECTION, POWDER, LYOPHILIZED, FOR SOLUTION INTRAVENOUS; SUBCUTANEOUS
Status: COMPLETED | OUTPATIENT
Start: 2021-04-01 | End: 2021-04-01

## 2021-04-01 RX ORDER — SODIUM CHLORIDE 0.9 % (FLUSH) 0.9 %
10 SYRINGE (ML) INJECTION
Status: DISCONTINUED | OUTPATIENT
Start: 2021-04-01 | End: 2021-04-01 | Stop reason: HOSPADM

## 2021-04-01 RX ORDER — HEPARIN 100 UNIT/ML
500 SYRINGE INTRAVENOUS
Status: DISCONTINUED | OUTPATIENT
Start: 2021-04-01 | End: 2021-04-01 | Stop reason: HOSPADM

## 2021-04-01 RX ORDER — HEPARIN 100 UNIT/ML
500 SYRINGE INTRAVENOUS
Status: CANCELLED | OUTPATIENT
Start: 2021-05-16

## 2021-04-01 RX ORDER — ZOLEDRONIC ACID 0.04 MG/ML
4 INJECTION, SOLUTION INTRAVENOUS
Status: CANCELLED | OUTPATIENT
Start: 2021-05-16

## 2021-04-01 RX ADMIN — SODIUM CHLORIDE: 9 INJECTION, SOLUTION INTRAVENOUS at 08:04

## 2021-04-01 RX ADMIN — BORTEZOMIB 1.9 MG: 3.5 INJECTION, POWDER, LYOPHILIZED, FOR SOLUTION INTRAVENOUS; SUBCUTANEOUS at 08:04

## 2021-04-01 RX ADMIN — ZOLEDRONIC ACID 4 MG: 0.04 INJECTION, SOLUTION INTRAVENOUS at 08:04

## 2021-04-01 RX ADMIN — Medication 10 ML: at 09:04

## 2021-04-03 LAB
C DIPHTHERIAE AB SER IA-ACNC: >2 IU/ML
C TETANI TOXOID AB SER-ACNC: >5.33 IU/ML
DEPRECATED S PNEUM23 IGG SER-MCNC: >53 UG/ML
DEPRECATED S PNEUM4 IGG SER-MCNC: 17.3 UG/ML
HAEM INFLU B IGG SER IA-MCNC: >9 MCG/ML
S PN DA SERO 19F IGG SER-MCNC: 82.4 UG/ML
S PNEUM DA 1 IGG SER-MCNC: 31.4 UG/ML
S PNEUM DA 14 IGG SER-MCNC: 58.3
S PNEUM DA 18C IGG SER-MCNC: >72
S PNEUM DA 19A IGG SER-MCNC: >141 UG/ML
S PNEUM DA 3 IGG SER-MCNC: 30.2 UG/ML
S PNEUM DA 5 IGG SER-MCNC: 48.2 UG/ML
S PNEUM DA 6A IGG SER-MCNC: 31.8 UG/ML
S PNEUM DA 6B IGG SER-MCNC: 12.4 UG/ML
S PNEUM DA 7F IGG SER-MCNC: 74.2 UG/ML
S PNEUM DA 9V IGG SER-MCNC: 19.6 UG/ML

## 2021-04-05 ENCOUNTER — PATIENT MESSAGE (OUTPATIENT)
Dept: ADMINISTRATIVE | Facility: HOSPITAL | Age: 71
End: 2021-04-05

## 2021-04-05 ENCOUNTER — HOSPITAL ENCOUNTER (OUTPATIENT)
Dept: RADIOLOGY | Facility: HOSPITAL | Age: 71
Discharge: HOME | End: 2021-04-05
Attending: INTERNAL MEDICINE
Payer: MEDICARE

## 2021-04-05 DIAGNOSIS — R06.00 DYSPNEA, UNSPECIFIED TYPE: ICD-10-CM

## 2021-04-05 PROCEDURE — 93970 US LOWER EXTREMITY VEINS BILATERAL: ICD-10-PCS | Mod: 26,BMT,, | Performed by: RADIOLOGY

## 2021-04-05 PROCEDURE — 93970 EXTREMITY STUDY: CPT | Mod: TC

## 2021-04-05 PROCEDURE — 93970 EXTREMITY STUDY: CPT | Mod: 26,BMT,, | Performed by: RADIOLOGY

## 2021-04-14 ENCOUNTER — IMMUNIZATION (OUTPATIENT)
Dept: PHARMACY | Facility: CLINIC | Age: 71
End: 2021-04-14
Payer: MEDICARE

## 2021-04-14 DIAGNOSIS — Z23 NEED FOR VACCINATION: Primary | ICD-10-CM

## 2021-04-15 ENCOUNTER — INFUSION (OUTPATIENT)
Dept: INFUSION THERAPY | Facility: HOSPITAL | Age: 71
End: 2021-04-15
Payer: MEDICARE

## 2021-04-15 ENCOUNTER — SPECIALTY PHARMACY (OUTPATIENT)
Dept: PHARMACY | Facility: CLINIC | Age: 71
End: 2021-04-15

## 2021-04-15 VITALS
TEMPERATURE: 98 F | WEIGHT: 181.69 LBS | HEIGHT: 63 IN | RESPIRATION RATE: 18 BRPM | DIASTOLIC BLOOD PRESSURE: 72 MMHG | BODY MASS INDEX: 32.19 KG/M2 | SYSTOLIC BLOOD PRESSURE: 147 MMHG | HEART RATE: 59 BPM

## 2021-04-15 DIAGNOSIS — C90.00 MULTIPLE MYELOMA NOT HAVING ACHIEVED REMISSION: ICD-10-CM

## 2021-04-15 DIAGNOSIS — C90.01 MULTIPLE MYELOMA IN REMISSION: Primary | ICD-10-CM

## 2021-04-15 PROCEDURE — 63600175 PHARM REV CODE 636 W HCPCS: Mod: JW,JG | Performed by: INTERNAL MEDICINE

## 2021-04-15 PROCEDURE — 96401 CHEMO ANTI-NEOPL SQ/IM: CPT

## 2021-04-15 RX ORDER — BORTEZOMIB 3.5 MG/1
1 INJECTION, POWDER, LYOPHILIZED, FOR SOLUTION INTRAVENOUS; SUBCUTANEOUS
Status: COMPLETED | OUTPATIENT
Start: 2021-04-15 | End: 2021-04-15

## 2021-04-15 RX ADMIN — BORTEZOMIB 1.9 MG: 3.5 INJECTION, POWDER, LYOPHILIZED, FOR SOLUTION INTRAVENOUS; SUBCUTANEOUS at 08:04

## 2021-04-23 NOTE — PROGRESS NOTES
Today I saw Ms. Thompson and she has taken a turn for the worse.  She comes in complaining of dyspnea on exertion.  She had some diphtheroids surgeon back in November where the echo or a.  But this is much worse.  We are working up for some liver lesions.  She has a history of liver lesions and an abdominal lymph nodes.  She set of MRIs follow up with hepatology.  Will she had a EGD back in 2016 which showed a nonbleeding angio ectasia.  A colonoscopy back in March 2015 showing a 5 mm hyperplastic polyp she had liver masses seen on CT scan recently and a spot in her spleen and also intra-abdominal lymph node.  Her previous mass that was seen in Section 6 of the liver does not look like has changed.  She does have a history of breast cancer diagnosis in 2011 treated and she had normal mammogram back in September 2018.  Most recent liver enzymes were normal.  She had a workup for liver lesion back in 2015 with her liver enzymes with elevated including MRI liver back in 2016 and she was seen by hepatology.    Today the main complaint of left-sided pain for last 2 or 3 weeks, worsening of the appearance of her shin had no longer allowed to walk very far.  She was put in to the clinic today in a rolling walker by her .  And also her  noticed that she has been getting confused easily .  On exam she is alert orient x4.  Her lungs sounded clear.  Her heart was relatively regular without ectopy.  A pulse ox 87% on room air at rest with a pulse of 91.      I am worried about a PE secondary to her chest pain, hypoxia, and dyspnea.  Also the significant worry about malignancy of unknown source.    ERIC.  
The pt is a 38y y/o G [id number]     @  39   weeks  edc   4/30/21  dated by lmp  who presents to labor & delivery for IOL secondary to chronic hypertension Pt reports + fm, no srom no vaginal bleeding occasional cramping but no regular contractions

## 2021-04-29 ENCOUNTER — INFUSION (OUTPATIENT)
Dept: INFUSION THERAPY | Facility: HOSPITAL | Age: 71
End: 2021-04-29
Payer: MEDICARE

## 2021-04-29 VITALS
SYSTOLIC BLOOD PRESSURE: 137 MMHG | RESPIRATION RATE: 18 BRPM | HEART RATE: 57 BPM | DIASTOLIC BLOOD PRESSURE: 78 MMHG | TEMPERATURE: 98 F

## 2021-04-29 DIAGNOSIS — C90.00 MULTIPLE MYELOMA NOT HAVING ACHIEVED REMISSION: ICD-10-CM

## 2021-04-29 DIAGNOSIS — C90.01 MULTIPLE MYELOMA IN REMISSION: Primary | ICD-10-CM

## 2021-04-29 PROCEDURE — 63600175 PHARM REV CODE 636 W HCPCS: Mod: JG | Performed by: INTERNAL MEDICINE

## 2021-04-29 PROCEDURE — 96401 CHEMO ANTI-NEOPL SQ/IM: CPT

## 2021-04-29 RX ORDER — BORTEZOMIB 3.5 MG/1
1 INJECTION, POWDER, LYOPHILIZED, FOR SOLUTION INTRAVENOUS; SUBCUTANEOUS
Status: COMPLETED | OUTPATIENT
Start: 2021-04-29 | End: 2021-04-29

## 2021-04-29 RX ADMIN — BORTEZOMIB 1.9 MG: 3.5 INJECTION, POWDER, LYOPHILIZED, FOR SOLUTION INTRAVENOUS; SUBCUTANEOUS at 08:04

## 2021-05-13 ENCOUNTER — OFFICE VISIT (OUTPATIENT)
Dept: HEMATOLOGY/ONCOLOGY | Facility: CLINIC | Age: 71
End: 2021-05-13
Payer: MEDICARE

## 2021-05-13 ENCOUNTER — INFUSION (OUTPATIENT)
Dept: INFUSION THERAPY | Facility: HOSPITAL | Age: 71
End: 2021-05-13
Payer: MEDICARE

## 2021-05-13 VITALS
HEIGHT: 63 IN | DIASTOLIC BLOOD PRESSURE: 84 MMHG | HEART RATE: 66 BPM | TEMPERATURE: 98 F | WEIGHT: 178.56 LBS | BODY MASS INDEX: 31.64 KG/M2 | SYSTOLIC BLOOD PRESSURE: 174 MMHG | OXYGEN SATURATION: 96 % | RESPIRATION RATE: 18 BRPM

## 2021-05-13 DIAGNOSIS — C90.00 MULTIPLE MYELOMA NOT HAVING ACHIEVED REMISSION: ICD-10-CM

## 2021-05-13 DIAGNOSIS — I69.30 HISTORY OF CVA WITH RESIDUAL DEFICIT: ICD-10-CM

## 2021-05-13 DIAGNOSIS — F32.A ANXIETY AND DEPRESSION: ICD-10-CM

## 2021-05-13 DIAGNOSIS — Z85.3 HISTORY OF LEFT BREAST CANCER: ICD-10-CM

## 2021-05-13 DIAGNOSIS — Z86.73 HISTORY OF CVA (CEREBROVASCULAR ACCIDENT): ICD-10-CM

## 2021-05-13 DIAGNOSIS — I71.9 DESCENDING AORTIC ANEURYSM: ICD-10-CM

## 2021-05-13 DIAGNOSIS — G62.9 PERIPHERAL POLYNEUROPATHY: ICD-10-CM

## 2021-05-13 DIAGNOSIS — R06.00 DYSPNEA, UNSPECIFIED TYPE: ICD-10-CM

## 2021-05-13 DIAGNOSIS — I10 ESSENTIAL HYPERTENSION: ICD-10-CM

## 2021-05-13 DIAGNOSIS — Z94.84 HISTORY OF AUTO STEM CELL TRANSPLANT: ICD-10-CM

## 2021-05-13 DIAGNOSIS — C90.01 MULTIPLE MYELOMA IN REMISSION: Primary | ICD-10-CM

## 2021-05-13 DIAGNOSIS — F41.9 ANXIETY AND DEPRESSION: ICD-10-CM

## 2021-05-13 DIAGNOSIS — E11.9 TYPE 2 DIABETES MELLITUS WITHOUT COMPLICATION, WITHOUT LONG-TERM CURRENT USE OF INSULIN: ICD-10-CM

## 2021-05-13 PROCEDURE — 63600175 PHARM REV CODE 636 W HCPCS: Mod: JG | Performed by: INTERNAL MEDICINE

## 2021-05-13 PROCEDURE — 1101F PR PT FALLS ASSESS DOC 0-1 FALLS W/OUT INJ PAST YR: ICD-10-PCS | Mod: BMT,CPTII,S$GLB, | Performed by: NURSE PRACTITIONER

## 2021-05-13 PROCEDURE — 3008F PR BODY MASS INDEX (BMI) DOCUMENTED: ICD-10-PCS | Mod: BMT,CPTII,S$GLB, | Performed by: NURSE PRACTITIONER

## 2021-05-13 PROCEDURE — 99999 PR PBB SHADOW E&M-EST. PATIENT-LVL IV: ICD-10-PCS | Mod: PBBFAC,BMT,, | Performed by: NURSE PRACTITIONER

## 2021-05-13 PROCEDURE — 99499 RISK ADDL DX/OHS AUDIT: ICD-10-PCS | Mod: HCNC,BMT,S$GLB, | Performed by: NURSE PRACTITIONER

## 2021-05-13 PROCEDURE — 1126F PR PAIN SEVERITY QUANTIFIED, NO PAIN PRESENT: ICD-10-PCS | Mod: BMT,S$GLB,, | Performed by: NURSE PRACTITIONER

## 2021-05-13 PROCEDURE — 1126F AMNT PAIN NOTED NONE PRSNT: CPT | Mod: BMT,S$GLB,, | Performed by: NURSE PRACTITIONER

## 2021-05-13 PROCEDURE — 99214 OFFICE O/P EST MOD 30 MIN: CPT | Mod: BMT,S$GLB,, | Performed by: NURSE PRACTITIONER

## 2021-05-13 PROCEDURE — 1159F MED LIST DOCD IN RCRD: CPT | Mod: BMT,S$GLB,, | Performed by: NURSE PRACTITIONER

## 2021-05-13 PROCEDURE — 1101F PT FALLS ASSESS-DOCD LE1/YR: CPT | Mod: BMT,CPTII,S$GLB, | Performed by: NURSE PRACTITIONER

## 2021-05-13 PROCEDURE — 99499 UNLISTED E&M SERVICE: CPT | Mod: HCNC,BMT,S$GLB, | Performed by: NURSE PRACTITIONER

## 2021-05-13 PROCEDURE — 3072F PR LOW RISK FOR RETINOPATHY: ICD-10-PCS | Mod: BMT,S$GLB,, | Performed by: NURSE PRACTITIONER

## 2021-05-13 PROCEDURE — 1159F PR MEDICATION LIST DOCUMENTED IN MEDICAL RECORD: ICD-10-PCS | Mod: BMT,S$GLB,, | Performed by: NURSE PRACTITIONER

## 2021-05-13 PROCEDURE — 3288F FALL RISK ASSESSMENT DOCD: CPT | Mod: BMT,CPTII,S$GLB, | Performed by: NURSE PRACTITIONER

## 2021-05-13 PROCEDURE — 99214 PR OFFICE/OUTPT VISIT, EST, LEVL IV, 30-39 MIN: ICD-10-PCS | Mod: BMT,S$GLB,, | Performed by: NURSE PRACTITIONER

## 2021-05-13 PROCEDURE — 3072F LOW RISK FOR RETINOPATHY: CPT | Mod: BMT,S$GLB,, | Performed by: NURSE PRACTITIONER

## 2021-05-13 PROCEDURE — 99999 PR PBB SHADOW E&M-EST. PATIENT-LVL IV: CPT | Mod: PBBFAC,BMT,, | Performed by: NURSE PRACTITIONER

## 2021-05-13 PROCEDURE — 96401 CHEMO ANTI-NEOPL SQ/IM: CPT

## 2021-05-13 PROCEDURE — 3008F BODY MASS INDEX DOCD: CPT | Mod: BMT,CPTII,S$GLB, | Performed by: NURSE PRACTITIONER

## 2021-05-13 PROCEDURE — 3288F PR FALLS RISK ASSESSMENT DOCUMENTED: ICD-10-PCS | Mod: BMT,CPTII,S$GLB, | Performed by: NURSE PRACTITIONER

## 2021-05-13 RX ORDER — BORTEZOMIB 3.5 MG/1
INJECTION, POWDER, LYOPHILIZED, FOR SOLUTION INTRAVENOUS; SUBCUTANEOUS
COMMUNITY
Start: 2021-03-18 | End: 2022-07-07 | Stop reason: ALTCHOICE

## 2021-05-13 RX ORDER — HEPATITIS B VACCINE (RECOMBINANT) 20 UG/ML
INJECTION, SUSPENSION INTRAMUSCULAR
COMMUNITY
Start: 2021-02-08

## 2021-05-13 RX ORDER — ACYCLOVIR 400 MG/1
TABLET ORAL 2 TIMES DAILY
COMMUNITY
End: 2022-11-23 | Stop reason: SDUPTHER

## 2021-05-13 RX ORDER — BORTEZOMIB 3.5 MG/1
1 INJECTION, POWDER, LYOPHILIZED, FOR SOLUTION INTRAVENOUS; SUBCUTANEOUS
Status: COMPLETED | OUTPATIENT
Start: 2021-05-13 | End: 2021-05-13

## 2021-05-13 RX ORDER — POTASSIUM CHLORIDE 20 MEQ/1
20 TABLET, EXTENDED RELEASE ORAL DAILY
COMMUNITY
Start: 2021-04-24 | End: 2021-10-20

## 2021-05-13 RX ADMIN — BORTEZOMIB 1.9 MG: 3.5 INJECTION, POWDER, LYOPHILIZED, FOR SOLUTION INTRAVENOUS; SUBCUTANEOUS at 08:05

## 2021-05-26 RX ORDER — HEPARIN 100 UNIT/ML
500 SYRINGE INTRAVENOUS
Status: CANCELLED | OUTPATIENT
Start: 2021-05-26

## 2021-05-26 RX ORDER — SODIUM CHLORIDE 0.9 % (FLUSH) 0.9 %
10 SYRINGE (ML) INJECTION
Status: CANCELLED | OUTPATIENT
Start: 2021-05-26

## 2021-05-26 RX ORDER — BORTEZOMIB 3.5 MG/1
1 INJECTION, POWDER, LYOPHILIZED, FOR SOLUTION INTRAVENOUS; SUBCUTANEOUS
Status: CANCELLED | OUTPATIENT
Start: 2021-05-26

## 2021-05-27 ENCOUNTER — INFUSION (OUTPATIENT)
Dept: INFUSION THERAPY | Facility: HOSPITAL | Age: 71
End: 2021-05-27
Payer: MEDICARE

## 2021-05-27 VITALS
DIASTOLIC BLOOD PRESSURE: 67 MMHG | SYSTOLIC BLOOD PRESSURE: 140 MMHG | RESPIRATION RATE: 18 BRPM | WEIGHT: 179.69 LBS | HEIGHT: 63 IN | BODY MASS INDEX: 31.84 KG/M2 | HEART RATE: 56 BPM | TEMPERATURE: 98 F

## 2021-05-27 DIAGNOSIS — C90.00 MULTIPLE MYELOMA NOT HAVING ACHIEVED REMISSION: ICD-10-CM

## 2021-05-27 DIAGNOSIS — C90.01 MULTIPLE MYELOMA IN REMISSION: Primary | ICD-10-CM

## 2021-05-27 PROCEDURE — 96401 CHEMO ANTI-NEOPL SQ/IM: CPT

## 2021-05-27 PROCEDURE — 63600175 PHARM REV CODE 636 W HCPCS: Mod: JG | Performed by: INTERNAL MEDICINE

## 2021-05-27 RX ORDER — BORTEZOMIB 3.5 MG/1
1 INJECTION, POWDER, LYOPHILIZED, FOR SOLUTION INTRAVENOUS; SUBCUTANEOUS
Status: COMPLETED | OUTPATIENT
Start: 2021-05-27 | End: 2021-05-27

## 2021-05-27 RX ADMIN — BORTEZOMIB 1.9 MG: 3.5 INJECTION, POWDER, LYOPHILIZED, FOR SOLUTION INTRAVENOUS; SUBCUTANEOUS at 10:05

## 2021-06-03 ENCOUNTER — OFFICE VISIT (OUTPATIENT)
Dept: INTERNAL MEDICINE | Facility: CLINIC | Age: 71
End: 2021-06-03
Payer: MEDICARE

## 2021-06-03 VITALS
WEIGHT: 179.88 LBS | OXYGEN SATURATION: 95 % | HEART RATE: 63 BPM | BODY MASS INDEX: 31.87 KG/M2 | HEIGHT: 63 IN | DIASTOLIC BLOOD PRESSURE: 82 MMHG | SYSTOLIC BLOOD PRESSURE: 132 MMHG

## 2021-06-03 DIAGNOSIS — C90.01 MULTIPLE MYELOMA IN REMISSION: Primary | ICD-10-CM

## 2021-06-03 DIAGNOSIS — Z78.9 STATIN INTOLERANCE: ICD-10-CM

## 2021-06-03 DIAGNOSIS — Z86.010 HISTORY OF COLON POLYPS: ICD-10-CM

## 2021-06-03 DIAGNOSIS — G62.0 DRUG-INDUCED POLYNEUROPATHY: ICD-10-CM

## 2021-06-03 DIAGNOSIS — R25.1 TREMOR: ICD-10-CM

## 2021-06-03 DIAGNOSIS — Z79.4 TYPE 2 DIABETES MELLITUS WITH DIABETIC PERIPHERAL ANGIOPATHY WITHOUT GANGRENE, WITH LONG-TERM CURRENT USE OF INSULIN: ICD-10-CM

## 2021-06-03 DIAGNOSIS — K31.9 GASTROPATHY: ICD-10-CM

## 2021-06-03 DIAGNOSIS — E11.9 TYPE 2 DIABETES MELLITUS WITHOUT COMPLICATION, WITHOUT LONG-TERM CURRENT USE OF INSULIN: ICD-10-CM

## 2021-06-03 DIAGNOSIS — E11.51 TYPE 2 DIABETES MELLITUS WITH DIABETIC PERIPHERAL ANGIOPATHY WITHOUT GANGRENE, WITH LONG-TERM CURRENT USE OF INSULIN: ICD-10-CM

## 2021-06-03 DIAGNOSIS — E78.00 PURE HYPERCHOLESTEROLEMIA: ICD-10-CM

## 2021-06-03 DIAGNOSIS — I10 ESSENTIAL HYPERTENSION: ICD-10-CM

## 2021-06-03 DIAGNOSIS — I69.351 HEMIPLEGIA AND HEMIPARESIS FOLLOWING CEREBRAL INFARCTION AFFECTING RIGHT DOMINANT SIDE: ICD-10-CM

## 2021-06-03 PROCEDURE — 1126F PR PAIN SEVERITY QUANTIFIED, NO PAIN PRESENT: ICD-10-PCS | Mod: BMT,S$GLB,, | Performed by: INTERNAL MEDICINE

## 2021-06-03 PROCEDURE — 99214 PR OFFICE/OUTPT VISIT, EST, LEVL IV, 30-39 MIN: ICD-10-PCS | Mod: BMT,S$GLB,, | Performed by: INTERNAL MEDICINE

## 2021-06-03 PROCEDURE — 1101F PT FALLS ASSESS-DOCD LE1/YR: CPT | Mod: BMT,CPTII,S$GLB, | Performed by: INTERNAL MEDICINE

## 2021-06-03 PROCEDURE — 3072F LOW RISK FOR RETINOPATHY: CPT | Mod: BMT,S$GLB,, | Performed by: INTERNAL MEDICINE

## 2021-06-03 PROCEDURE — 99214 OFFICE O/P EST MOD 30 MIN: CPT | Mod: BMT,S$GLB,, | Performed by: INTERNAL MEDICINE

## 2021-06-03 PROCEDURE — 3072F PR LOW RISK FOR RETINOPATHY: ICD-10-PCS | Mod: BMT,S$GLB,, | Performed by: INTERNAL MEDICINE

## 2021-06-03 PROCEDURE — 3008F PR BODY MASS INDEX (BMI) DOCUMENTED: ICD-10-PCS | Mod: BMT,CPTII,S$GLB, | Performed by: INTERNAL MEDICINE

## 2021-06-03 PROCEDURE — 3008F BODY MASS INDEX DOCD: CPT | Mod: BMT,CPTII,S$GLB, | Performed by: INTERNAL MEDICINE

## 2021-06-03 PROCEDURE — 99999 PR PBB SHADOW E&M-EST. PATIENT-LVL III: ICD-10-PCS | Mod: PBBFAC,BMT,, | Performed by: INTERNAL MEDICINE

## 2021-06-03 PROCEDURE — 99999 PR PBB SHADOW E&M-EST. PATIENT-LVL III: CPT | Mod: PBBFAC,BMT,, | Performed by: INTERNAL MEDICINE

## 2021-06-03 PROCEDURE — 1159F PR MEDICATION LIST DOCUMENTED IN MEDICAL RECORD: ICD-10-PCS | Mod: BMT,S$GLB,, | Performed by: INTERNAL MEDICINE

## 2021-06-03 PROCEDURE — 3288F PR FALLS RISK ASSESSMENT DOCUMENTED: ICD-10-PCS | Mod: BMT,CPTII,S$GLB, | Performed by: INTERNAL MEDICINE

## 2021-06-03 PROCEDURE — 3288F FALL RISK ASSESSMENT DOCD: CPT | Mod: BMT,CPTII,S$GLB, | Performed by: INTERNAL MEDICINE

## 2021-06-03 PROCEDURE — 1126F AMNT PAIN NOTED NONE PRSNT: CPT | Mod: BMT,S$GLB,, | Performed by: INTERNAL MEDICINE

## 2021-06-03 PROCEDURE — 1101F PR PT FALLS ASSESS DOC 0-1 FALLS W/OUT INJ PAST YR: ICD-10-PCS | Mod: BMT,CPTII,S$GLB, | Performed by: INTERNAL MEDICINE

## 2021-06-03 PROCEDURE — 1159F MED LIST DOCD IN RCRD: CPT | Mod: BMT,S$GLB,, | Performed by: INTERNAL MEDICINE

## 2021-06-08 ENCOUNTER — PATIENT MESSAGE (OUTPATIENT)
Dept: HEMATOLOGY/ONCOLOGY | Facility: CLINIC | Age: 71
End: 2021-06-08

## 2021-06-08 DIAGNOSIS — C90.01 MULTIPLE MYELOMA IN REMISSION: ICD-10-CM

## 2021-06-08 RX ORDER — GABAPENTIN 300 MG/1
600 CAPSULE ORAL 2 TIMES DAILY
Qty: 180 CAPSULE | Refills: 2 | Status: SHIPPED | OUTPATIENT
Start: 2021-06-08 | End: 2021-07-23 | Stop reason: SDUPTHER

## 2021-06-09 RX ORDER — BORTEZOMIB 3.5 MG/1
1 INJECTION, POWDER, LYOPHILIZED, FOR SOLUTION INTRAVENOUS; SUBCUTANEOUS
Status: CANCELLED | OUTPATIENT
Start: 2021-06-09

## 2021-06-09 RX ORDER — SODIUM CHLORIDE 0.9 % (FLUSH) 0.9 %
10 SYRINGE (ML) INJECTION
Status: CANCELLED | OUTPATIENT
Start: 2021-06-09

## 2021-06-09 RX ORDER — HEPARIN 100 UNIT/ML
500 SYRINGE INTRAVENOUS
Status: CANCELLED | OUTPATIENT
Start: 2021-06-09

## 2021-06-10 ENCOUNTER — INFUSION (OUTPATIENT)
Dept: INFUSION THERAPY | Facility: HOSPITAL | Age: 71
End: 2021-06-10
Attending: INTERNAL MEDICINE
Payer: MEDICARE

## 2021-06-10 VITALS
TEMPERATURE: 98 F | DIASTOLIC BLOOD PRESSURE: 70 MMHG | RESPIRATION RATE: 18 BRPM | SYSTOLIC BLOOD PRESSURE: 139 MMHG | HEART RATE: 67 BPM

## 2021-06-10 DIAGNOSIS — C90.01 MULTIPLE MYELOMA IN REMISSION: Primary | ICD-10-CM

## 2021-06-10 DIAGNOSIS — C90.00 MULTIPLE MYELOMA NOT HAVING ACHIEVED REMISSION: ICD-10-CM

## 2021-06-10 PROCEDURE — 63600175 PHARM REV CODE 636 W HCPCS: Mod: JW,JG | Performed by: INTERNAL MEDICINE

## 2021-06-10 PROCEDURE — 96401 CHEMO ANTI-NEOPL SQ/IM: CPT

## 2021-06-10 RX ORDER — BORTEZOMIB 3.5 MG/1
1 INJECTION, POWDER, LYOPHILIZED, FOR SOLUTION INTRAVENOUS; SUBCUTANEOUS
Status: COMPLETED | OUTPATIENT
Start: 2021-06-10 | End: 2021-06-10

## 2021-06-10 RX ADMIN — BORTEZOMIB 1.9 MG: 3.5 INJECTION, POWDER, LYOPHILIZED, FOR SOLUTION INTRAVENOUS; SUBCUTANEOUS at 09:06

## 2021-06-17 NOTE — NURSING
velcade given. jimmy well.  D/c'd in NAD.   Quality 431: Preventive Care And Screening: Unhealthy Alcohol Use - Screening: Patient screened for unhealthy alcohol use using a single question and scores less than 2 times per year Quality 226: Preventive Care And Screening: Tobacco Use: Screening And Cessation Intervention: Patient screened for tobacco use and is an ex/non-smoker Quality 110: Preventive Care And Screening: Influenza Immunization: Influenza Immunization Administered during Influenza season Detail Level: Detailed

## 2021-06-23 ENCOUNTER — INFUSION (OUTPATIENT)
Dept: INFUSION THERAPY | Facility: HOSPITAL | Age: 71
End: 2021-06-23
Attending: INTERNAL MEDICINE
Payer: MEDICARE

## 2021-06-23 ENCOUNTER — OFFICE VISIT (OUTPATIENT)
Dept: HEMATOLOGY/ONCOLOGY | Facility: CLINIC | Age: 71
End: 2021-06-23
Payer: MEDICARE

## 2021-06-23 ENCOUNTER — LAB VISIT (OUTPATIENT)
Dept: LAB | Facility: HOSPITAL | Age: 71
End: 2021-06-23
Attending: INTERNAL MEDICINE
Payer: MEDICARE

## 2021-06-23 VITALS
TEMPERATURE: 98 F | WEIGHT: 180.44 LBS | DIASTOLIC BLOOD PRESSURE: 92 MMHG | RESPIRATION RATE: 16 BRPM | HEART RATE: 69 BPM | HEIGHT: 63 IN | SYSTOLIC BLOOD PRESSURE: 143 MMHG | OXYGEN SATURATION: 95 % | BODY MASS INDEX: 31.97 KG/M2

## 2021-06-23 DIAGNOSIS — C90.02 MULTIPLE MYELOMA IN RELAPSE: Primary | ICD-10-CM

## 2021-06-23 DIAGNOSIS — C96.9 MALIGNANT NEOPLASM OF LYMPHOID, HEMATOPOIETIC AND RELATED TISSUE, UNSPECIFIED: ICD-10-CM

## 2021-06-23 DIAGNOSIS — C90.00 MULTIPLE MYELOMA NOT HAVING ACHIEVED REMISSION: ICD-10-CM

## 2021-06-23 DIAGNOSIS — E11.9 TYPE 2 DIABETES MELLITUS WITHOUT COMPLICATION, WITHOUT LONG-TERM CURRENT USE OF INSULIN: ICD-10-CM

## 2021-06-23 DIAGNOSIS — C90.01 MULTIPLE MYELOMA IN REMISSION: Primary | ICD-10-CM

## 2021-06-23 DIAGNOSIS — Z94.84 HISTORY OF AUTO STEM CELL TRANSPLANT: ICD-10-CM

## 2021-06-23 DIAGNOSIS — E78.00 PURE HYPERCHOLESTEROLEMIA: ICD-10-CM

## 2021-06-23 DIAGNOSIS — C90.00 MULTIPLE MYELOMA, REMISSION STATUS UNSPECIFIED: ICD-10-CM

## 2021-06-23 DIAGNOSIS — C90.01 MULTIPLE MYELOMA IN REMISSION: ICD-10-CM

## 2021-06-23 LAB
ALBUMIN SERPL BCP-MCNC: 4 G/DL (ref 3.5–5.2)
ALP SERPL-CCNC: 60 U/L (ref 55–135)
ALT SERPL W/O P-5'-P-CCNC: 16 U/L (ref 10–44)
ANION GAP SERPL CALC-SCNC: 11 MMOL/L (ref 8–16)
AST SERPL-CCNC: 21 U/L (ref 10–40)
BASOPHILS # BLD AUTO: 0.02 K/UL (ref 0–0.2)
BASOPHILS NFR BLD: 0.6 % (ref 0–1.9)
BILIRUB SERPL-MCNC: 0.6 MG/DL (ref 0.1–1)
BUN SERPL-MCNC: 12 MG/DL (ref 8–23)
CALCIUM SERPL-MCNC: 9.3 MG/DL (ref 8.7–10.5)
CHLORIDE SERPL-SCNC: 108 MMOL/L (ref 95–110)
CHOLEST SERPL-MCNC: 185 MG/DL (ref 120–199)
CHOLEST/HDLC SERPL: 3.8 {RATIO} (ref 2–5)
CO2 SERPL-SCNC: 25 MMOL/L (ref 23–29)
CREAT SERPL-MCNC: 1 MG/DL (ref 0.5–1.4)
DIFFERENTIAL METHOD: ABNORMAL
EOSINOPHIL # BLD AUTO: 0.1 K/UL (ref 0–0.5)
EOSINOPHIL NFR BLD: 1.6 % (ref 0–8)
ERYTHROCYTE [DISTWIDTH] IN BLOOD BY AUTOMATED COUNT: 15.4 % (ref 11.5–14.5)
EST. GFR  (AFRICAN AMERICAN): >60 ML/MIN/1.73 M^2
EST. GFR  (NON AFRICAN AMERICAN): 56.4 ML/MIN/1.73 M^2
ESTIMATED AVG GLUCOSE: 160 MG/DL (ref 68–131)
GLUCOSE SERPL-MCNC: 107 MG/DL (ref 70–110)
HBA1C MFR BLD: 7.2 % (ref 4–5.6)
HCT VFR BLD AUTO: 36.9 % (ref 37–48.5)
HDLC SERPL-MCNC: 49 MG/DL (ref 40–75)
HDLC SERPL: 26.5 % (ref 20–50)
HGB BLD-MCNC: 11.5 G/DL (ref 12–16)
IGA SERPL-MCNC: 72 MG/DL (ref 40–350)
IGG SERPL-MCNC: 1521 MG/DL (ref 650–1600)
IGM SERPL-MCNC: 26 MG/DL (ref 50–300)
IMM GRANULOCYTES # BLD AUTO: 0.02 K/UL (ref 0–0.04)
IMM GRANULOCYTES NFR BLD AUTO: 0.6 % (ref 0–0.5)
LDLC SERPL CALC-MCNC: 100.8 MG/DL (ref 63–159)
LYMPHOCYTES # BLD AUTO: 0.7 K/UL (ref 1–4.8)
LYMPHOCYTES NFR BLD: 20.9 % (ref 18–48)
MCH RBC QN AUTO: 26 PG (ref 27–31)
MCHC RBC AUTO-ENTMCNC: 31.2 G/DL (ref 32–36)
MCV RBC AUTO: 83 FL (ref 82–98)
MONOCYTES # BLD AUTO: 0.4 K/UL (ref 0.3–1)
MONOCYTES NFR BLD: 11.8 % (ref 4–15)
NEUTROPHILS # BLD AUTO: 2.1 K/UL (ref 1.8–7.7)
NEUTROPHILS NFR BLD: 64.5 % (ref 38–73)
NONHDLC SERPL-MCNC: 136 MG/DL
NRBC BLD-RTO: 0 /100 WBC
PLATELET # BLD AUTO: 168 K/UL (ref 150–450)
PMV BLD AUTO: 11.2 FL (ref 9.2–12.9)
POTASSIUM SERPL-SCNC: 3.7 MMOL/L (ref 3.5–5.1)
PROT SERPL-MCNC: 8 G/DL (ref 6–8.4)
RBC # BLD AUTO: 4.43 M/UL (ref 4–5.4)
SODIUM SERPL-SCNC: 144 MMOL/L (ref 136–145)
TRIGL SERPL-MCNC: 176 MG/DL (ref 30–150)
WBC # BLD AUTO: 3.21 K/UL (ref 3.9–12.7)

## 2021-06-23 PROCEDURE — 99215 OFFICE O/P EST HI 40 MIN: CPT | Mod: BMT,S$GLB,, | Performed by: INTERNAL MEDICINE

## 2021-06-23 PROCEDURE — 3008F PR BODY MASS INDEX (BMI) DOCUMENTED: ICD-10-PCS | Mod: BMT,CPTII,S$GLB, | Performed by: INTERNAL MEDICINE

## 2021-06-23 PROCEDURE — 3008F BODY MASS INDEX DOCD: CPT | Mod: BMT,CPTII,S$GLB, | Performed by: INTERNAL MEDICINE

## 2021-06-23 PROCEDURE — 1159F MED LIST DOCD IN RCRD: CPT | Mod: BMT,S$GLB,, | Performed by: INTERNAL MEDICINE

## 2021-06-23 PROCEDURE — 1159F PR MEDICATION LIST DOCUMENTED IN MEDICAL RECORD: ICD-10-PCS | Mod: BMT,S$GLB,, | Performed by: INTERNAL MEDICINE

## 2021-06-23 PROCEDURE — 85025 COMPLETE CBC W/AUTO DIFF WBC: CPT | Performed by: INTERNAL MEDICINE

## 2021-06-23 PROCEDURE — 82784 ASSAY IGA/IGD/IGG/IGM EACH: CPT | Mod: 59 | Performed by: INTERNAL MEDICINE

## 2021-06-23 PROCEDURE — 99999 PR PBB SHADOW E&M-EST. PATIENT-LVL III: ICD-10-PCS | Mod: PBBFAC,BMT,, | Performed by: INTERNAL MEDICINE

## 2021-06-23 PROCEDURE — 84165 PROTEIN E-PHORESIS SERUM: CPT | Performed by: INTERNAL MEDICINE

## 2021-06-23 PROCEDURE — 83036 HEMOGLOBIN GLYCOSYLATED A1C: CPT | Performed by: INTERNAL MEDICINE

## 2021-06-23 PROCEDURE — 86334 PATHOLOGIST INTERPRETATION IFE: ICD-10-PCS | Mod: 26,BMT,, | Performed by: PATHOLOGY

## 2021-06-23 PROCEDURE — 86334 IMMUNOFIX E-PHORESIS SERUM: CPT | Performed by: INTERNAL MEDICINE

## 2021-06-23 PROCEDURE — 80053 COMPREHEN METABOLIC PANEL: CPT | Performed by: INTERNAL MEDICINE

## 2021-06-23 PROCEDURE — 96401 CHEMO ANTI-NEOPL SQ/IM: CPT

## 2021-06-23 PROCEDURE — 1126F AMNT PAIN NOTED NONE PRSNT: CPT | Mod: BMT,S$GLB,, | Performed by: INTERNAL MEDICINE

## 2021-06-23 PROCEDURE — 83520 IMMUNOASSAY QUANT NOS NONAB: CPT | Mod: 59 | Performed by: INTERNAL MEDICINE

## 2021-06-23 PROCEDURE — 36415 COLL VENOUS BLD VENIPUNCTURE: CPT | Performed by: INTERNAL MEDICINE

## 2021-06-23 PROCEDURE — 1126F PR PAIN SEVERITY QUANTIFIED, NO PAIN PRESENT: ICD-10-PCS | Mod: BMT,S$GLB,, | Performed by: INTERNAL MEDICINE

## 2021-06-23 PROCEDURE — 99999 PR PBB SHADOW E&M-EST. PATIENT-LVL III: CPT | Mod: PBBFAC,BMT,, | Performed by: INTERNAL MEDICINE

## 2021-06-23 PROCEDURE — 80061 LIPID PANEL: CPT | Performed by: INTERNAL MEDICINE

## 2021-06-23 PROCEDURE — 84165 PATHOLOGIST INTERPRETATION SPE: ICD-10-PCS | Mod: 26,BMT,, | Performed by: PATHOLOGY

## 2021-06-23 PROCEDURE — 3072F PR LOW RISK FOR RETINOPATHY: ICD-10-PCS | Mod: BMT,S$GLB,, | Performed by: INTERNAL MEDICINE

## 2021-06-23 PROCEDURE — 84165 PROTEIN E-PHORESIS SERUM: CPT | Mod: 26,BMT,, | Performed by: PATHOLOGY

## 2021-06-23 PROCEDURE — 86334 IMMUNOFIX E-PHORESIS SERUM: CPT | Mod: 26,BMT,, | Performed by: PATHOLOGY

## 2021-06-23 PROCEDURE — 3072F LOW RISK FOR RETINOPATHY: CPT | Mod: BMT,S$GLB,, | Performed by: INTERNAL MEDICINE

## 2021-06-23 PROCEDURE — 99215 PR OFFICE/OUTPT VISIT, EST, LEVL V, 40-54 MIN: ICD-10-PCS | Mod: BMT,S$GLB,, | Performed by: INTERNAL MEDICINE

## 2021-06-23 PROCEDURE — 63600175 PHARM REV CODE 636 W HCPCS: Mod: JG | Performed by: INTERNAL MEDICINE

## 2021-06-23 RX ORDER — BORTEZOMIB 3.5 MG/1
1 INJECTION, POWDER, LYOPHILIZED, FOR SOLUTION INTRAVENOUS; SUBCUTANEOUS
Status: COMPLETED | OUTPATIENT
Start: 2021-06-23 | End: 2021-06-23

## 2021-06-23 RX ORDER — HEPARIN 100 UNIT/ML
500 SYRINGE INTRAVENOUS
Status: CANCELLED | OUTPATIENT
Start: 2021-06-23

## 2021-06-23 RX ORDER — BORTEZOMIB 3.5 MG/1
1 INJECTION, POWDER, LYOPHILIZED, FOR SOLUTION INTRAVENOUS; SUBCUTANEOUS
Status: CANCELLED | OUTPATIENT
Start: 2021-06-23

## 2021-06-23 RX ORDER — SODIUM CHLORIDE 0.9 % (FLUSH) 0.9 %
10 SYRINGE (ML) INJECTION
Status: CANCELLED | OUTPATIENT
Start: 2021-06-23

## 2021-06-23 RX ORDER — DEXAMETHASONE 4 MG/1
40 TABLET ORAL WEEKLY
Qty: 120 TABLET | Refills: 1 | Status: SHIPPED | OUTPATIENT
Start: 2021-06-23 | End: 2021-12-17

## 2021-06-23 RX ADMIN — BORTEZOMIB 1.9 MG: 3.5 INJECTION, POWDER, LYOPHILIZED, FOR SOLUTION INTRAVENOUS; SUBCUTANEOUS at 02:06

## 2021-06-24 LAB
ALBUMIN SERPL ELPH-MCNC: 4.36 G/DL (ref 3.35–5.55)
ALPHA1 GLOB SERPL ELPH-MCNC: 0.3 G/DL (ref 0.17–0.41)
ALPHA2 GLOB SERPL ELPH-MCNC: 0.93 G/DL (ref 0.43–0.99)
B-GLOBULIN SERPL ELPH-MCNC: 0.78 G/DL (ref 0.5–1.1)
GAMMA GLOB SERPL ELPH-MCNC: 1.43 G/DL (ref 0.67–1.58)
INTERPRETATION SERPL IFE-IMP: NORMAL
KAPPA LC SER QL IA: 9.87 MG/DL (ref 0.33–1.94)
KAPPA LC/LAMBDA SER IA: 7.77 (ref 0.26–1.65)
LAMBDA LC SER QL IA: 1.27 MG/DL (ref 0.57–2.63)
PATHOLOGIST INTERPRETATION IFE: NORMAL
PATHOLOGIST INTERPRETATION SPE: NORMAL
PROT SERPL-MCNC: 7.8 G/DL (ref 6–8.4)

## 2021-07-02 ENCOUNTER — HOSPITAL ENCOUNTER (OUTPATIENT)
Dept: RADIOLOGY | Facility: HOSPITAL | Age: 71
Discharge: HOME | End: 2021-07-02
Attending: INTERNAL MEDICINE
Payer: MEDICARE

## 2021-07-02 DIAGNOSIS — C96.9 MALIGNANT NEOPLASM OF LYMPHOID, HEMATOPOIETIC AND RELATED TISSUE, UNSPECIFIED: ICD-10-CM

## 2021-07-02 LAB — POCT GLUCOSE: 130 MG/DL (ref 70–110)

## 2021-07-02 PROCEDURE — 78816 PET IMAGE W/CT FULL BODY: CPT | Mod: 26,BMT,PS, | Performed by: RADIOLOGY

## 2021-07-02 PROCEDURE — 78816 PET IMAGE W/CT FULL BODY: CPT | Mod: TC

## 2021-07-02 PROCEDURE — 78816 NM PET CT WHOLE BODY: ICD-10-PCS | Mod: 26,BMT,PS, | Performed by: RADIOLOGY

## 2021-07-02 PROCEDURE — A9698 NON-RAD CONTRAST MATERIALNOC: HCPCS | Performed by: INTERNAL MEDICINE

## 2021-07-02 PROCEDURE — 25500020 PHARM REV CODE 255: Performed by: INTERNAL MEDICINE

## 2021-07-02 RX ADMIN — IOHEXOL 1000 ML: 9 SOLUTION ORAL at 09:07

## 2021-07-06 ENCOUNTER — PATIENT MESSAGE (OUTPATIENT)
Dept: ADMINISTRATIVE | Facility: HOSPITAL | Age: 71
End: 2021-07-06

## 2021-07-06 ENCOUNTER — HOSPITAL ENCOUNTER (OUTPATIENT)
Dept: RADIOLOGY | Facility: CLINIC | Age: 71
Discharge: HOME | End: 2021-07-06
Attending: NURSE PRACTITIONER
Payer: MEDICARE

## 2021-07-06 DIAGNOSIS — Z78.0 POSTMENOPAUSAL STATE: ICD-10-CM

## 2021-07-06 PROCEDURE — 77080 DXA BONE DENSITY AXIAL: CPT | Mod: 26,BMT,, | Performed by: INTERNAL MEDICINE

## 2021-07-06 PROCEDURE — 77080 DEXA BONE DENSITY SPINE HIP: ICD-10-PCS | Mod: 26,BMT,, | Performed by: INTERNAL MEDICINE

## 2021-07-06 PROCEDURE — 77080 DXA BONE DENSITY AXIAL: CPT | Mod: TC

## 2021-07-07 ENCOUNTER — PATIENT MESSAGE (OUTPATIENT)
Dept: PHARMACY | Facility: CLINIC | Age: 71
End: 2021-07-07

## 2021-07-07 ENCOUNTER — CLINICAL SUPPORT (OUTPATIENT)
Dept: HEMATOLOGY/ONCOLOGY | Facility: CLINIC | Age: 71
End: 2021-07-07
Payer: MEDICARE

## 2021-07-07 ENCOUNTER — LAB VISIT (OUTPATIENT)
Dept: LAB | Facility: HOSPITAL | Age: 71
End: 2021-07-07
Payer: MEDICARE

## 2021-07-07 ENCOUNTER — OFFICE VISIT (OUTPATIENT)
Dept: HEMATOLOGY/ONCOLOGY | Facility: CLINIC | Age: 71
End: 2021-07-07
Payer: MEDICARE

## 2021-07-07 VITALS
OXYGEN SATURATION: 96 % | HEIGHT: 63 IN | HEART RATE: 52 BPM | RESPIRATION RATE: 16 BRPM | SYSTOLIC BLOOD PRESSURE: 164 MMHG | DIASTOLIC BLOOD PRESSURE: 79 MMHG | BODY MASS INDEX: 31.79 KG/M2 | WEIGHT: 179.44 LBS | TEMPERATURE: 98 F

## 2021-07-07 DIAGNOSIS — C90.00 MULTIPLE MYELOMA, REMISSION STATUS UNSPECIFIED: ICD-10-CM

## 2021-07-07 DIAGNOSIS — Z94.84 HISTORY OF AUTO STEM CELL TRANSPLANT: Primary | ICD-10-CM

## 2021-07-07 DIAGNOSIS — C90.01 MULTIPLE MYELOMA IN REMISSION: ICD-10-CM

## 2021-07-07 DIAGNOSIS — C90.02 MULTIPLE MYELOMA IN RELAPSE: Primary | ICD-10-CM

## 2021-07-07 LAB
ALBUMIN SERPL BCP-MCNC: 4 G/DL (ref 3.5–5.2)
ALP SERPL-CCNC: 60 U/L (ref 55–135)
ALT SERPL W/O P-5'-P-CCNC: 16 U/L (ref 10–44)
ANION GAP SERPL CALC-SCNC: 9 MMOL/L (ref 8–16)
AST SERPL-CCNC: 22 U/L (ref 10–40)
BASOPHILS # BLD AUTO: 0.02 K/UL (ref 0–0.2)
BASOPHILS NFR BLD: 0.7 % (ref 0–1.9)
BILIRUB SERPL-MCNC: 0.6 MG/DL (ref 0.1–1)
BUN SERPL-MCNC: 14 MG/DL (ref 8–23)
CALCIUM SERPL-MCNC: 9.2 MG/DL (ref 8.7–10.5)
CHLORIDE SERPL-SCNC: 106 MMOL/L (ref 95–110)
CO2 SERPL-SCNC: 28 MMOL/L (ref 23–29)
CREAT SERPL-MCNC: 1.1 MG/DL (ref 0.5–1.4)
DIFFERENTIAL METHOD: ABNORMAL
EOSINOPHIL # BLD AUTO: 0.1 K/UL (ref 0–0.5)
EOSINOPHIL NFR BLD: 2 % (ref 0–8)
ERYTHROCYTE [DISTWIDTH] IN BLOOD BY AUTOMATED COUNT: 15.6 % (ref 11.5–14.5)
EST. GFR  (AFRICAN AMERICAN): 58 ML/MIN/1.73 M^2
EST. GFR  (NON AFRICAN AMERICAN): 50.3 ML/MIN/1.73 M^2
GLUCOSE SERPL-MCNC: 122 MG/DL (ref 70–110)
HCT VFR BLD AUTO: 38 % (ref 37–48.5)
HGB BLD-MCNC: 11.7 G/DL (ref 12–16)
IGA SERPL-MCNC: 68 MG/DL (ref 40–350)
IGG SERPL-MCNC: 1514 MG/DL (ref 650–1600)
IGM SERPL-MCNC: 29 MG/DL (ref 50–300)
IMM GRANULOCYTES # BLD AUTO: 0.01 K/UL (ref 0–0.04)
IMM GRANULOCYTES NFR BLD AUTO: 0.3 % (ref 0–0.5)
LYMPHOCYTES # BLD AUTO: 0.6 K/UL (ref 1–4.8)
LYMPHOCYTES NFR BLD: 20.1 % (ref 18–48)
MCH RBC QN AUTO: 25.9 PG (ref 27–31)
MCHC RBC AUTO-ENTMCNC: 30.8 G/DL (ref 32–36)
MCV RBC AUTO: 84 FL (ref 82–98)
MONOCYTES # BLD AUTO: 0.3 K/UL (ref 0.3–1)
MONOCYTES NFR BLD: 11.3 % (ref 4–15)
NEUTROPHILS # BLD AUTO: 1.9 K/UL (ref 1.8–7.7)
NEUTROPHILS NFR BLD: 65.6 % (ref 38–73)
NRBC BLD-RTO: 0 /100 WBC
PLATELET # BLD AUTO: 162 K/UL (ref 150–450)
PMV BLD AUTO: 11.9 FL (ref 9.2–12.9)
POTASSIUM SERPL-SCNC: 3.9 MMOL/L (ref 3.5–5.1)
PROT SERPL-MCNC: 7.8 G/DL (ref 6–8.4)
RBC # BLD AUTO: 4.52 M/UL (ref 4–5.4)
SODIUM SERPL-SCNC: 143 MMOL/L (ref 136–145)
WBC # BLD AUTO: 2.93 K/UL (ref 3.9–12.7)

## 2021-07-07 PROCEDURE — 3072F LOW RISK FOR RETINOPATHY: CPT | Mod: BMT,S$GLB,, | Performed by: INTERNAL MEDICINE

## 2021-07-07 PROCEDURE — 80053 COMPREHEN METABOLIC PANEL: CPT | Performed by: INTERNAL MEDICINE

## 2021-07-07 PROCEDURE — 1159F MED LIST DOCD IN RCRD: CPT | Mod: BMT,S$GLB,, | Performed by: INTERNAL MEDICINE

## 2021-07-07 PROCEDURE — 3072F PR LOW RISK FOR RETINOPATHY: ICD-10-PCS | Mod: BMT,S$GLB,, | Performed by: INTERNAL MEDICINE

## 2021-07-07 PROCEDURE — 86334 IMMUNOFIX E-PHORESIS SERUM: CPT | Performed by: INTERNAL MEDICINE

## 2021-07-07 PROCEDURE — 84165 PATHOLOGIST INTERPRETATION SPE: ICD-10-PCS | Mod: 26,BMT,, | Performed by: PATHOLOGY

## 2021-07-07 PROCEDURE — 84165 PROTEIN E-PHORESIS SERUM: CPT | Mod: 26,BMT,, | Performed by: PATHOLOGY

## 2021-07-07 PROCEDURE — 1101F PR PT FALLS ASSESS DOC 0-1 FALLS W/OUT INJ PAST YR: ICD-10-PCS | Mod: BMT,CPTII,S$GLB, | Performed by: INTERNAL MEDICINE

## 2021-07-07 PROCEDURE — 86334 PATHOLOGIST INTERPRETATION IFE: ICD-10-PCS | Mod: 26,BMT,, | Performed by: PATHOLOGY

## 2021-07-07 PROCEDURE — 86334 IMMUNOFIX E-PHORESIS SERUM: CPT | Mod: 26,BMT,, | Performed by: PATHOLOGY

## 2021-07-07 PROCEDURE — 1126F AMNT PAIN NOTED NONE PRSNT: CPT | Mod: BMT,S$GLB,, | Performed by: INTERNAL MEDICINE

## 2021-07-07 PROCEDURE — 82784 ASSAY IGA/IGD/IGG/IGM EACH: CPT | Mod: 59 | Performed by: INTERNAL MEDICINE

## 2021-07-07 PROCEDURE — 36415 COLL VENOUS BLD VENIPUNCTURE: CPT | Performed by: INTERNAL MEDICINE

## 2021-07-07 PROCEDURE — 1126F PR PAIN SEVERITY QUANTIFIED, NO PAIN PRESENT: ICD-10-PCS | Mod: BMT,S$GLB,, | Performed by: INTERNAL MEDICINE

## 2021-07-07 PROCEDURE — 1101F PT FALLS ASSESS-DOCD LE1/YR: CPT | Mod: BMT,CPTII,S$GLB, | Performed by: INTERNAL MEDICINE

## 2021-07-07 PROCEDURE — 99999 PR PBB SHADOW E&M-EST. PATIENT-LVL IV: ICD-10-PCS | Mod: PBBFAC,BMT,, | Performed by: INTERNAL MEDICINE

## 2021-07-07 PROCEDURE — 99215 PR OFFICE/OUTPT VISIT, EST, LEVL V, 40-54 MIN: ICD-10-PCS | Mod: BMT,S$GLB,, | Performed by: INTERNAL MEDICINE

## 2021-07-07 PROCEDURE — 85025 COMPLETE CBC W/AUTO DIFF WBC: CPT | Performed by: INTERNAL MEDICINE

## 2021-07-07 PROCEDURE — 99999 PR PBB SHADOW E&M-EST. PATIENT-LVL IV: CPT | Mod: PBBFAC,BMT,, | Performed by: INTERNAL MEDICINE

## 2021-07-07 PROCEDURE — 99499 UNLISTED E&M SERVICE: CPT | Mod: HCNC,BMT,S$GLB, | Performed by: INTERNAL MEDICINE

## 2021-07-07 PROCEDURE — 3288F PR FALLS RISK ASSESSMENT DOCUMENTED: ICD-10-PCS | Mod: BMT,CPTII,S$GLB, | Performed by: INTERNAL MEDICINE

## 2021-07-07 PROCEDURE — 84165 PROTEIN E-PHORESIS SERUM: CPT | Performed by: INTERNAL MEDICINE

## 2021-07-07 PROCEDURE — 99499 RISK ADDL DX/OHS AUDIT: ICD-10-PCS | Mod: HCNC,BMT,S$GLB, | Performed by: INTERNAL MEDICINE

## 2021-07-07 PROCEDURE — 3288F FALL RISK ASSESSMENT DOCD: CPT | Mod: BMT,CPTII,S$GLB, | Performed by: INTERNAL MEDICINE

## 2021-07-07 PROCEDURE — 3008F PR BODY MASS INDEX (BMI) DOCUMENTED: ICD-10-PCS | Mod: BMT,CPTII,S$GLB, | Performed by: INTERNAL MEDICINE

## 2021-07-07 PROCEDURE — 3008F BODY MASS INDEX DOCD: CPT | Mod: BMT,CPTII,S$GLB, | Performed by: INTERNAL MEDICINE

## 2021-07-07 PROCEDURE — 83520 IMMUNOASSAY QUANT NOS NONAB: CPT | Performed by: INTERNAL MEDICINE

## 2021-07-07 PROCEDURE — 99215 OFFICE O/P EST HI 40 MIN: CPT | Mod: BMT,S$GLB,, | Performed by: INTERNAL MEDICINE

## 2021-07-07 PROCEDURE — 1159F PR MEDICATION LIST DOCUMENTED IN MEDICAL RECORD: ICD-10-PCS | Mod: BMT,S$GLB,, | Performed by: INTERNAL MEDICINE

## 2021-07-08 ENCOUNTER — SPECIALTY PHARMACY (OUTPATIENT)
Dept: PHARMACY | Facility: CLINIC | Age: 71
End: 2021-07-08

## 2021-07-08 ENCOUNTER — INFUSION (OUTPATIENT)
Dept: INFUSION THERAPY | Facility: HOSPITAL | Age: 71
End: 2021-07-08
Payer: MEDICARE

## 2021-07-08 VITALS
HEART RATE: 59 BPM | DIASTOLIC BLOOD PRESSURE: 69 MMHG | RESPIRATION RATE: 18 BRPM | TEMPERATURE: 99 F | SYSTOLIC BLOOD PRESSURE: 135 MMHG

## 2021-07-08 DIAGNOSIS — Z51.11 ENCOUNTER FOR CHEMOTHERAPY MANAGEMENT: Primary | ICD-10-CM

## 2021-07-08 DIAGNOSIS — C90.01 MULTIPLE MYELOMA IN REMISSION: ICD-10-CM

## 2021-07-08 DIAGNOSIS — Z51.11 ENCOUNTER FOR CHEMOTHERAPY MANAGEMENT: ICD-10-CM

## 2021-07-08 DIAGNOSIS — C96.9 MALIGNANT NEOPLASM OF LYMPHOID, HEMATOPOIETIC AND RELATED TISSUE, UNSPECIFIED: Primary | ICD-10-CM

## 2021-07-08 DIAGNOSIS — C90.00 MULTIPLE MYELOMA NOT HAVING ACHIEVED REMISSION: ICD-10-CM

## 2021-07-08 DIAGNOSIS — Z94.81 STATUS POST AUTOLOGOUS BONE MARROW TRANSPLANT: ICD-10-CM

## 2021-07-08 LAB
ABO + RH BLD: NORMAL
ALBUMIN SERPL ELPH-MCNC: 4.19 G/DL (ref 3.35–5.55)
ALPHA1 GLOB SERPL ELPH-MCNC: 0.29 G/DL (ref 0.17–0.41)
ALPHA2 GLOB SERPL ELPH-MCNC: 0.91 G/DL (ref 0.43–0.99)
B-GLOBULIN SERPL ELPH-MCNC: 0.74 G/DL (ref 0.5–1.1)
BLD GP AB SCN CELLS X3 SERPL QL: NORMAL
GAMMA GLOB SERPL ELPH-MCNC: 1.38 G/DL (ref 0.67–1.58)
HBV CORE AB SERPL QL IA: NEGATIVE
HBV SURFACE AB SER-ACNC: NEGATIVE M[IU]/ML
HBV SURFACE AG SERPL QL IA: NEGATIVE
INTERPRETATION SERPL IFE-IMP: NORMAL
KAPPA LC SER QL IA: 10.56 MG/DL (ref 0.33–1.94)
KAPPA LC/LAMBDA SER IA: 12.57 (ref 0.26–1.65)
LAMBDA LC SER QL IA: 0.84 MG/DL (ref 0.57–2.63)
PATHOLOGIST INTERPRETATION IFE: NORMAL
PATHOLOGIST INTERPRETATION SPE: NORMAL
PROT SERPL-MCNC: 7.5 G/DL (ref 6–8.4)

## 2021-07-08 PROCEDURE — 63600175 PHARM REV CODE 636 W HCPCS: Mod: JG | Performed by: INTERNAL MEDICINE

## 2021-07-08 PROCEDURE — 86706 HEP B SURFACE ANTIBODY: CPT | Performed by: INTERNAL MEDICINE

## 2021-07-08 PROCEDURE — 86900 BLOOD TYPING SEROLOGIC ABO: CPT | Performed by: INTERNAL MEDICINE

## 2021-07-08 PROCEDURE — 86704 HEP B CORE ANTIBODY TOTAL: CPT | Performed by: INTERNAL MEDICINE

## 2021-07-08 PROCEDURE — 25000003 PHARM REV CODE 250: Performed by: INTERNAL MEDICINE

## 2021-07-08 PROCEDURE — 96401 CHEMO ANTI-NEOPL SQ/IM: CPT

## 2021-07-08 PROCEDURE — 87340 HEPATITIS B SURFACE AG IA: CPT | Performed by: INTERNAL MEDICINE

## 2021-07-08 PROCEDURE — 96375 TX/PRO/DX INJ NEW DRUG ADDON: CPT

## 2021-07-08 PROCEDURE — 96374 THER/PROPH/DIAG INJ IV PUSH: CPT

## 2021-07-08 RX ORDER — ACETAMINOPHEN 325 MG/1
650 TABLET ORAL
Status: CANCELLED | OUTPATIENT
Start: 2021-07-08

## 2021-07-08 RX ORDER — MONTELUKAST SODIUM 10 MG/1
10 TABLET ORAL
Status: CANCELLED | OUTPATIENT
Start: 2021-07-23

## 2021-07-08 RX ORDER — FAMOTIDINE 20 MG/1
20 TABLET, FILM COATED ORAL
Status: COMPLETED | OUTPATIENT
Start: 2021-07-08 | End: 2021-07-08

## 2021-07-08 RX ORDER — DIPHENHYDRAMINE HYDROCHLORIDE 50 MG/ML
50 INJECTION INTRAMUSCULAR; INTRAVENOUS ONCE AS NEEDED
Status: CANCELLED | OUTPATIENT
Start: 2021-07-15

## 2021-07-08 RX ORDER — DIPHENHYDRAMINE HYDROCHLORIDE 50 MG/ML
50 INJECTION INTRAMUSCULAR; INTRAVENOUS ONCE AS NEEDED
Status: DISCONTINUED | OUTPATIENT
Start: 2021-07-08 | End: 2021-07-08 | Stop reason: HOSPADM

## 2021-07-08 RX ORDER — DIPHENHYDRAMINE HYDROCHLORIDE 50 MG/ML
50 INJECTION INTRAMUSCULAR; INTRAVENOUS ONCE AS NEEDED
Status: CANCELLED | OUTPATIENT
Start: 2021-08-05

## 2021-07-08 RX ORDER — DEXAMETHASONE 4 MG/1
20 TABLET ORAL
Status: CANCELLED | OUTPATIENT
Start: 2021-07-08

## 2021-07-08 RX ORDER — DEXAMETHASONE 4 MG/1
20 TABLET ORAL
Status: CANCELLED | OUTPATIENT
Start: 2021-07-15

## 2021-07-08 RX ORDER — DEXAMETHASONE 4 MG/1
20 TABLET ORAL
Status: COMPLETED | OUTPATIENT
Start: 2021-07-08 | End: 2021-07-08

## 2021-07-08 RX ORDER — FAMOTIDINE 20 MG/1
20 TABLET, FILM COATED ORAL
Status: CANCELLED | OUTPATIENT
Start: 2021-07-08

## 2021-07-08 RX ORDER — DIPHENHYDRAMINE HYDROCHLORIDE 50 MG/ML
50 INJECTION INTRAMUSCULAR; INTRAVENOUS ONCE AS NEEDED
Status: CANCELLED | OUTPATIENT
Start: 2021-07-08

## 2021-07-08 RX ORDER — SODIUM CHLORIDE 0.9 % (FLUSH) 0.9 %
10 SYRINGE (ML) INJECTION
Status: CANCELLED | OUTPATIENT
Start: 2021-08-14

## 2021-07-08 RX ORDER — DEXAMETHASONE 4 MG/1
20 TABLET ORAL
Status: CANCELLED | OUTPATIENT
Start: 2021-07-23

## 2021-07-08 RX ORDER — ACETAMINOPHEN 325 MG/1
650 TABLET ORAL
Status: COMPLETED | OUTPATIENT
Start: 2021-07-08 | End: 2021-07-08

## 2021-07-08 RX ORDER — HEPARIN 100 UNIT/ML
500 SYRINGE INTRAVENOUS
Status: CANCELLED | OUTPATIENT
Start: 2021-08-14

## 2021-07-08 RX ORDER — DIPHENHYDRAMINE HYDROCHLORIDE 50 MG/ML
50 INJECTION INTRAMUSCULAR; INTRAVENOUS ONCE AS NEEDED
Status: CANCELLED | OUTPATIENT
Start: 2021-07-23

## 2021-07-08 RX ORDER — DIPHENHYDRAMINE HCL 25 MG
25 CAPSULE ORAL
Status: CANCELLED | OUTPATIENT
Start: 2021-07-15

## 2021-07-08 RX ORDER — EPINEPHRINE 0.3 MG/.3ML
0.3 INJECTION SUBCUTANEOUS ONCE AS NEEDED
Status: CANCELLED | OUTPATIENT
Start: 2021-07-23

## 2021-07-08 RX ORDER — MONTELUKAST SODIUM 10 MG/1
10 TABLET ORAL
Status: CANCELLED | OUTPATIENT
Start: 2021-07-08

## 2021-07-08 RX ORDER — EPINEPHRINE 0.3 MG/.3ML
0.3 INJECTION SUBCUTANEOUS ONCE AS NEEDED
Status: CANCELLED | OUTPATIENT
Start: 2021-08-05

## 2021-07-08 RX ORDER — BORTEZOMIB 3.5 MG/1
1.3 INJECTION, POWDER, LYOPHILIZED, FOR SOLUTION INTRAVENOUS; SUBCUTANEOUS
Status: CANCELLED | OUTPATIENT
Start: 2021-08-05

## 2021-07-08 RX ORDER — BORTEZOMIB 3.5 MG/1
1.3 INJECTION, POWDER, LYOPHILIZED, FOR SOLUTION INTRAVENOUS; SUBCUTANEOUS
Status: CANCELLED | OUTPATIENT
Start: 2021-07-15

## 2021-07-08 RX ORDER — MONTELUKAST SODIUM 10 MG/1
10 TABLET ORAL
Status: CANCELLED | OUTPATIENT
Start: 2021-07-15

## 2021-07-08 RX ORDER — DIPHENHYDRAMINE HCL 25 MG
25 CAPSULE ORAL
Status: CANCELLED | OUTPATIENT
Start: 2021-07-08

## 2021-07-08 RX ORDER — DIPHENHYDRAMINE HCL 25 MG
25 CAPSULE ORAL
Status: CANCELLED | OUTPATIENT
Start: 2021-08-05

## 2021-07-08 RX ORDER — MONTELUKAST SODIUM 10 MG/1
10 TABLET ORAL
Status: COMPLETED | OUTPATIENT
Start: 2021-07-08 | End: 2021-07-08

## 2021-07-08 RX ORDER — EPINEPHRINE 0.3 MG/.3ML
0.3 INJECTION SUBCUTANEOUS ONCE AS NEEDED
Status: CANCELLED | OUTPATIENT
Start: 2021-07-15

## 2021-07-08 RX ORDER — DEXAMETHASONE 4 MG/1
20 TABLET ORAL
Status: CANCELLED | OUTPATIENT
Start: 2021-08-05

## 2021-07-08 RX ORDER — DIPHENHYDRAMINE HCL 25 MG
25 CAPSULE ORAL
Status: CANCELLED | OUTPATIENT
Start: 2021-07-23

## 2021-07-08 RX ORDER — BORTEZOMIB 3.5 MG/1
1.3 INJECTION, POWDER, LYOPHILIZED, FOR SOLUTION INTRAVENOUS; SUBCUTANEOUS
Status: CANCELLED | OUTPATIENT
Start: 2021-07-23

## 2021-07-08 RX ORDER — ACETAMINOPHEN 325 MG/1
650 TABLET ORAL
Status: CANCELLED | OUTPATIENT
Start: 2021-07-23

## 2021-07-08 RX ORDER — BORTEZOMIB 3.5 MG/1
1.3 INJECTION, POWDER, LYOPHILIZED, FOR SOLUTION INTRAVENOUS; SUBCUTANEOUS
Status: COMPLETED | OUTPATIENT
Start: 2021-07-08 | End: 2021-07-08

## 2021-07-08 RX ORDER — ACETAMINOPHEN 325 MG/1
650 TABLET ORAL
Status: CANCELLED | OUTPATIENT
Start: 2021-07-15

## 2021-07-08 RX ORDER — BORTEZOMIB 3.5 MG/1
1.3 INJECTION, POWDER, LYOPHILIZED, FOR SOLUTION INTRAVENOUS; SUBCUTANEOUS
Status: CANCELLED | OUTPATIENT
Start: 2021-07-08

## 2021-07-08 RX ORDER — EPINEPHRINE 0.3 MG/.3ML
0.3 INJECTION SUBCUTANEOUS ONCE AS NEEDED
Status: CANCELLED | OUTPATIENT
Start: 2021-07-08

## 2021-07-08 RX ORDER — EPINEPHRINE 0.3 MG/.3ML
0.3 INJECTION SUBCUTANEOUS ONCE AS NEEDED
Status: DISCONTINUED | OUTPATIENT
Start: 2021-07-08 | End: 2021-07-08 | Stop reason: HOSPADM

## 2021-07-08 RX ORDER — ACETAMINOPHEN 325 MG/1
650 TABLET ORAL
Status: CANCELLED | OUTPATIENT
Start: 2021-08-05

## 2021-07-08 RX ORDER — DIPHENHYDRAMINE HCL 25 MG
25 CAPSULE ORAL
Status: COMPLETED | OUTPATIENT
Start: 2021-07-08 | End: 2021-07-08

## 2021-07-08 RX ADMIN — DIPHENHYDRAMINE HYDROCHLORIDE 25 MG: 25 CAPSULE ORAL at 11:07

## 2021-07-08 RX ADMIN — DEXAMETHASONE 20 MG: 4 TABLET ORAL at 11:07

## 2021-07-08 RX ADMIN — FAMOTIDINE 20 MG: 20 TABLET, FILM COATED ORAL at 11:07

## 2021-07-08 RX ADMIN — BORTEZOMIB 2.5 MG: 3.5 INJECTION, POWDER, LYOPHILIZED, FOR SOLUTION INTRAVENOUS; SUBCUTANEOUS at 12:07

## 2021-07-08 RX ADMIN — MONTELUKAST 10 MG: 10 TABLET, FILM COATED ORAL at 11:07

## 2021-07-08 RX ADMIN — DARATUMUMAB AND HYALURONIDASE-FIHJ (HUMAN RECOMBINANT) 1800 MG: 1800; 30000 INJECTION SUBCUTANEOUS at 12:07

## 2021-07-08 RX ADMIN — ZOLEDRONIC ACID 3.3 MG: 4 INJECTION, SOLUTION, CONCENTRATE INTRAVENOUS at 12:07

## 2021-07-08 RX ADMIN — ACETAMINOPHEN 650 MG: 325 TABLET ORAL at 11:07

## 2021-07-09 ENCOUNTER — TELEPHONE (OUTPATIENT)
Dept: HEMATOLOGY/ONCOLOGY | Facility: CLINIC | Age: 71
End: 2021-07-09

## 2021-07-22 ENCOUNTER — INFUSION (OUTPATIENT)
Dept: INFUSION THERAPY | Facility: HOSPITAL | Age: 71
End: 2021-07-22
Payer: MEDICARE

## 2021-07-22 VITALS
RESPIRATION RATE: 18 BRPM | TEMPERATURE: 99 F | DIASTOLIC BLOOD PRESSURE: 78 MMHG | WEIGHT: 179.44 LBS | HEART RATE: 61 BPM | OXYGEN SATURATION: 97 % | BODY MASS INDEX: 31.79 KG/M2 | HEIGHT: 63 IN | SYSTOLIC BLOOD PRESSURE: 142 MMHG

## 2021-07-22 DIAGNOSIS — C90.00 MULTIPLE MYELOMA NOT HAVING ACHIEVED REMISSION: ICD-10-CM

## 2021-07-22 DIAGNOSIS — C90.01 MULTIPLE MYELOMA IN REMISSION: Primary | ICD-10-CM

## 2021-07-22 PROCEDURE — 25000003 PHARM REV CODE 250: Performed by: INTERNAL MEDICINE

## 2021-07-22 PROCEDURE — 96401 CHEMO ANTI-NEOPL SQ/IM: CPT

## 2021-07-22 PROCEDURE — 63600175 PHARM REV CODE 636 W HCPCS: Mod: JW,JG | Performed by: INTERNAL MEDICINE

## 2021-07-22 RX ORDER — DIPHENHYDRAMINE HCL 25 MG
25 CAPSULE ORAL
Status: COMPLETED | OUTPATIENT
Start: 2021-07-22 | End: 2021-07-22

## 2021-07-22 RX ORDER — EPINEPHRINE 0.3 MG/.3ML
0.3 INJECTION SUBCUTANEOUS ONCE AS NEEDED
Status: DISCONTINUED | OUTPATIENT
Start: 2021-07-22 | End: 2021-07-22 | Stop reason: HOSPADM

## 2021-07-22 RX ORDER — DEXAMETHASONE 4 MG/1
20 TABLET ORAL
Status: COMPLETED | OUTPATIENT
Start: 2021-07-22 | End: 2021-07-22

## 2021-07-22 RX ORDER — DIPHENHYDRAMINE HYDROCHLORIDE 50 MG/ML
50 INJECTION INTRAMUSCULAR; INTRAVENOUS ONCE AS NEEDED
Status: DISCONTINUED | OUTPATIENT
Start: 2021-07-22 | End: 2021-07-22 | Stop reason: HOSPADM

## 2021-07-22 RX ORDER — ACETAMINOPHEN 325 MG/1
650 TABLET ORAL
Status: COMPLETED | OUTPATIENT
Start: 2021-07-22 | End: 2021-07-22

## 2021-07-22 RX ORDER — MONTELUKAST SODIUM 10 MG/1
10 TABLET ORAL
Status: COMPLETED | OUTPATIENT
Start: 2021-07-22 | End: 2021-07-22

## 2021-07-22 RX ORDER — BORTEZOMIB 3.5 MG/1
1.3 INJECTION, POWDER, LYOPHILIZED, FOR SOLUTION INTRAVENOUS; SUBCUTANEOUS
Status: COMPLETED | OUTPATIENT
Start: 2021-07-22 | End: 2021-07-22

## 2021-07-22 RX ADMIN — DIPHENHYDRAMINE HYDROCHLORIDE 25 MG: 25 CAPSULE ORAL at 12:07

## 2021-07-22 RX ADMIN — DARATUMUMAB AND HYALURONIDASE-FIHJ (HUMAN RECOMBINANT) 1800 MG: 1800; 30000 INJECTION SUBCUTANEOUS at 01:07

## 2021-07-22 RX ADMIN — MONTELUKAST 10 MG: 10 TABLET, FILM COATED ORAL at 12:07

## 2021-07-22 RX ADMIN — ACETAMINOPHEN 650 MG: 325 TABLET ORAL at 12:07

## 2021-07-22 RX ADMIN — BORTEZOMIB 2.5 MG: 3.5 INJECTION, POWDER, LYOPHILIZED, FOR SOLUTION INTRAVENOUS; SUBCUTANEOUS at 01:07

## 2021-07-22 RX ADMIN — DEXAMETHASONE 20 MG: 4 TABLET ORAL at 12:07

## 2021-07-29 ENCOUNTER — INFUSION (OUTPATIENT)
Dept: INFUSION THERAPY | Facility: HOSPITAL | Age: 71
End: 2021-07-29
Payer: MEDICARE

## 2021-07-29 VITALS
DIASTOLIC BLOOD PRESSURE: 78 MMHG | TEMPERATURE: 99 F | HEART RATE: 79 BPM | SYSTOLIC BLOOD PRESSURE: 137 MMHG | RESPIRATION RATE: 18 BRPM

## 2021-07-29 DIAGNOSIS — C90.01 MULTIPLE MYELOMA IN REMISSION: Primary | ICD-10-CM

## 2021-07-29 DIAGNOSIS — C90.00 MULTIPLE MYELOMA NOT HAVING ACHIEVED REMISSION: ICD-10-CM

## 2021-07-29 PROCEDURE — 63600175 PHARM REV CODE 636 W HCPCS: Performed by: INTERNAL MEDICINE

## 2021-07-29 PROCEDURE — 25000003 PHARM REV CODE 250: Performed by: INTERNAL MEDICINE

## 2021-07-29 PROCEDURE — 96401 CHEMO ANTI-NEOPL SQ/IM: CPT

## 2021-07-29 RX ORDER — BORTEZOMIB 3.5 MG/1
1.3 INJECTION, POWDER, LYOPHILIZED, FOR SOLUTION INTRAVENOUS; SUBCUTANEOUS
Status: COMPLETED | OUTPATIENT
Start: 2021-07-29 | End: 2021-07-29

## 2021-07-29 RX ORDER — DEXAMETHASONE 4 MG/1
20 TABLET ORAL
Status: COMPLETED | OUTPATIENT
Start: 2021-07-29 | End: 2021-07-29

## 2021-07-29 RX ORDER — DIPHENHYDRAMINE HYDROCHLORIDE 50 MG/ML
50 INJECTION INTRAMUSCULAR; INTRAVENOUS ONCE AS NEEDED
Status: DISCONTINUED | OUTPATIENT
Start: 2021-07-29 | End: 2021-07-29 | Stop reason: HOSPADM

## 2021-07-29 RX ORDER — EPINEPHRINE 0.3 MG/.3ML
0.3 INJECTION SUBCUTANEOUS ONCE AS NEEDED
Status: DISCONTINUED | OUTPATIENT
Start: 2021-07-29 | End: 2021-07-29 | Stop reason: HOSPADM

## 2021-07-29 RX ORDER — DIPHENHYDRAMINE HCL 25 MG
25 CAPSULE ORAL
Status: COMPLETED | OUTPATIENT
Start: 2021-07-29 | End: 2021-07-29

## 2021-07-29 RX ORDER — ACETAMINOPHEN 325 MG/1
650 TABLET ORAL
Status: COMPLETED | OUTPATIENT
Start: 2021-07-29 | End: 2021-07-29

## 2021-07-29 RX ORDER — MONTELUKAST SODIUM 10 MG/1
10 TABLET ORAL
Status: COMPLETED | OUTPATIENT
Start: 2021-07-29 | End: 2021-07-29

## 2021-07-29 RX ADMIN — MONTELUKAST 10 MG: 10 TABLET, FILM COATED ORAL at 02:07

## 2021-07-29 RX ADMIN — ACETAMINOPHEN 650 MG: 325 TABLET ORAL at 02:07

## 2021-07-29 RX ADMIN — DEXAMETHASONE 20 MG: 4 TABLET ORAL at 02:07

## 2021-07-29 RX ADMIN — DARATUMUMAB AND HYALURONIDASE-FIHJ (HUMAN RECOMBINANT) 1800 MG: 1800; 30000 INJECTION SUBCUTANEOUS at 03:07

## 2021-07-29 RX ADMIN — BORTEZOMIB 2.5 MG: 3.5 INJECTION, POWDER, LYOPHILIZED, FOR SOLUTION INTRAVENOUS; SUBCUTANEOUS at 03:07

## 2021-07-29 RX ADMIN — DIPHENHYDRAMINE HYDROCHLORIDE 25 MG: 25 CAPSULE ORAL at 02:07

## 2021-08-02 ENCOUNTER — HOSPITAL ENCOUNTER (INPATIENT)
Facility: HOSPITAL | Age: 71
LOS: 3 days | Discharge: HOME HEALTH | DRG: 193 | End: 2021-08-05
Attending: EMERGENCY MEDICINE | Admitting: INTERNAL MEDICINE
Payer: MEDICARE

## 2021-08-02 DIAGNOSIS — C90.01 MULTIPLE MYELOMA IN REMISSION: ICD-10-CM

## 2021-08-02 DIAGNOSIS — J96.01 ACUTE HYPOXEMIC RESPIRATORY FAILURE: ICD-10-CM

## 2021-08-02 DIAGNOSIS — J84.9 INTERSTITIAL LUNG DISEASE: ICD-10-CM

## 2021-08-02 DIAGNOSIS — R06.02 SHORTNESS OF BREATH: ICD-10-CM

## 2021-08-02 DIAGNOSIS — R07.9 CHEST PAIN: ICD-10-CM

## 2021-08-02 DIAGNOSIS — J18.9 COMMUNITY ACQUIRED PNEUMONIA OF RIGHT LOWER LOBE OF LUNG: ICD-10-CM

## 2021-08-02 DIAGNOSIS — J96.01 ACUTE RESPIRATORY FAILURE WITH HYPOXIA: ICD-10-CM

## 2021-08-02 DIAGNOSIS — R09.02 HYPOXIA: Primary | ICD-10-CM

## 2021-08-02 LAB
ALBUMIN SERPL BCP-MCNC: 3.2 G/DL (ref 3.5–5.2)
ALP SERPL-CCNC: 77 U/L (ref 55–135)
ALT SERPL W/O P-5'-P-CCNC: 29 U/L (ref 10–44)
ANION GAP SERPL CALC-SCNC: 11 MMOL/L (ref 8–16)
AST SERPL-CCNC: 23 U/L (ref 10–40)
BASOPHILS # BLD AUTO: 0.02 K/UL (ref 0–0.2)
BASOPHILS NFR BLD: 0.2 % (ref 0–1.9)
BILIRUB SERPL-MCNC: 0.8 MG/DL (ref 0.1–1)
BNP SERPL-MCNC: 59 PG/ML (ref 0–99)
BUN SERPL-MCNC: 15 MG/DL (ref 8–23)
CALCIUM SERPL-MCNC: 9.2 MG/DL (ref 8.7–10.5)
CHLORIDE SERPL-SCNC: 104 MMOL/L (ref 95–110)
CO2 SERPL-SCNC: 20 MMOL/L (ref 23–29)
CREAT SERPL-MCNC: 1.1 MG/DL (ref 0.5–1.4)
CTP QC/QA: YES
DIFFERENTIAL METHOD: ABNORMAL
EOSINOPHIL # BLD AUTO: 0 K/UL (ref 0–0.5)
EOSINOPHIL NFR BLD: 0.2 % (ref 0–8)
ERYTHROCYTE [DISTWIDTH] IN BLOOD BY AUTOMATED COUNT: 16.1 % (ref 11.5–14.5)
EST. GFR  (AFRICAN AMERICAN): 58 ML/MIN/1.73 M^2
EST. GFR  (NON AFRICAN AMERICAN): 50.3 ML/MIN/1.73 M^2
GLUCOSE SERPL-MCNC: 116 MG/DL (ref 70–110)
HCT VFR BLD AUTO: 34.8 % (ref 37–48.5)
HGB BLD-MCNC: 11.5 G/DL (ref 12–16)
IMM GRANULOCYTES # BLD AUTO: 0.06 K/UL (ref 0–0.04)
IMM GRANULOCYTES NFR BLD AUTO: 0.5 % (ref 0–0.5)
LACTATE SERPL-SCNC: 1 MMOL/L (ref 0.5–2.2)
LIPASE SERPL-CCNC: 11 U/L (ref 4–60)
LYMPHOCYTES # BLD AUTO: 0.5 K/UL (ref 1–4.8)
LYMPHOCYTES NFR BLD: 4.5 % (ref 18–48)
MCH RBC QN AUTO: 26.6 PG (ref 27–31)
MCHC RBC AUTO-ENTMCNC: 33 G/DL (ref 32–36)
MCV RBC AUTO: 81 FL (ref 82–98)
MONOCYTES # BLD AUTO: 0.8 K/UL (ref 0.3–1)
MONOCYTES NFR BLD: 6.9 % (ref 4–15)
NEUTROPHILS # BLD AUTO: 9.6 K/UL (ref 1.8–7.7)
NEUTROPHILS NFR BLD: 87.7 % (ref 38–73)
NRBC BLD-RTO: 0 /100 WBC
PLATELET # BLD AUTO: 120 K/UL (ref 150–450)
PLATELET BLD QL SMEAR: ABNORMAL
PMV BLD AUTO: ABNORMAL FL (ref 9.2–12.9)
POTASSIUM SERPL-SCNC: 4.7 MMOL/L (ref 3.5–5.1)
PROT SERPL-MCNC: 7.9 G/DL (ref 6–8.4)
RBC # BLD AUTO: 4.32 M/UL (ref 4–5.4)
SARS-COV-2 RDRP RESP QL NAA+PROBE: NEGATIVE
SODIUM SERPL-SCNC: 135 MMOL/L (ref 136–145)
TROPONIN I SERPL DL<=0.01 NG/ML-MCNC: 0.01 NG/ML (ref 0–0.03)
WBC # BLD AUTO: 11 K/UL (ref 3.9–12.7)

## 2021-08-02 PROCEDURE — 20600001 HC STEP DOWN PRIVATE ROOM

## 2021-08-02 PROCEDURE — 83880 ASSAY OF NATRIURETIC PEPTIDE: CPT | Performed by: PHYSICIAN ASSISTANT

## 2021-08-02 PROCEDURE — 99291 CRITICAL CARE FIRST HOUR: CPT | Mod: 25

## 2021-08-02 PROCEDURE — U0002 COVID-19 LAB TEST NON-CDC: HCPCS | Performed by: EMERGENCY MEDICINE

## 2021-08-02 PROCEDURE — 25500020 PHARM REV CODE 255: Performed by: EMERGENCY MEDICINE

## 2021-08-02 PROCEDURE — 25000003 PHARM REV CODE 250: Performed by: STUDENT IN AN ORGANIZED HEALTH CARE EDUCATION/TRAINING PROGRAM

## 2021-08-02 PROCEDURE — 83605 ASSAY OF LACTIC ACID: CPT | Performed by: PHYSICIAN ASSISTANT

## 2021-08-02 PROCEDURE — 85025 COMPLETE CBC W/AUTO DIFF WBC: CPT | Performed by: PHYSICIAN ASSISTANT

## 2021-08-02 PROCEDURE — 99291 PR CRITICAL CARE, E/M 30-74 MINUTES: ICD-10-PCS | Mod: BMT,CR,CS, | Performed by: EMERGENCY MEDICINE

## 2021-08-02 PROCEDURE — 83690 ASSAY OF LIPASE: CPT | Performed by: PHYSICIAN ASSISTANT

## 2021-08-02 PROCEDURE — 99291 CRITICAL CARE FIRST HOUR: CPT | Mod: BMT,CR,CS, | Performed by: EMERGENCY MEDICINE

## 2021-08-02 PROCEDURE — 80053 COMPREHEN METABOLIC PANEL: CPT | Performed by: PHYSICIAN ASSISTANT

## 2021-08-02 PROCEDURE — 93005 ELECTROCARDIOGRAM TRACING: CPT

## 2021-08-02 PROCEDURE — 86803 HEPATITIS C AB TEST: CPT | Performed by: EMERGENCY MEDICINE

## 2021-08-02 PROCEDURE — 93010 EKG 12-LEAD: ICD-10-PCS | Mod: BMT,,, | Performed by: INTERNAL MEDICINE

## 2021-08-02 PROCEDURE — 63600175 PHARM REV CODE 636 W HCPCS: Performed by: STUDENT IN AN ORGANIZED HEALTH CARE EDUCATION/TRAINING PROGRAM

## 2021-08-02 PROCEDURE — 84484 ASSAY OF TROPONIN QUANT: CPT | Performed by: PHYSICIAN ASSISTANT

## 2021-08-02 PROCEDURE — 93010 ELECTROCARDIOGRAM REPORT: CPT | Mod: BMT,,, | Performed by: INTERNAL MEDICINE

## 2021-08-02 RX ORDER — GLUCAGON 1 MG
1 KIT INJECTION
Status: DISCONTINUED | OUTPATIENT
Start: 2021-08-02 | End: 2021-08-05 | Stop reason: HOSPADM

## 2021-08-02 RX ORDER — AMLODIPINE BESYLATE 5 MG/1
5 TABLET ORAL DAILY
Status: DISCONTINUED | OUTPATIENT
Start: 2021-08-03 | End: 2021-08-05 | Stop reason: HOSPADM

## 2021-08-02 RX ORDER — SODIUM CHLORIDE 0.9 % (FLUSH) 0.9 %
10 SYRINGE (ML) INJECTION
Status: DISCONTINUED | OUTPATIENT
Start: 2021-08-02 | End: 2021-08-05 | Stop reason: HOSPADM

## 2021-08-02 RX ORDER — CEFEPIME HYDROCHLORIDE 2 G/1
2 INJECTION, POWDER, FOR SOLUTION INTRAVENOUS
Status: DISCONTINUED | OUTPATIENT
Start: 2021-08-02 | End: 2021-08-04

## 2021-08-02 RX ORDER — ASPIRIN 81 MG/1
81 TABLET ORAL DAILY
Status: DISCONTINUED | OUTPATIENT
Start: 2021-08-03 | End: 2021-08-05 | Stop reason: HOSPADM

## 2021-08-02 RX ORDER — ACYCLOVIR 200 MG/1
400 CAPSULE ORAL 2 TIMES DAILY
Status: DISCONTINUED | OUTPATIENT
Start: 2021-08-02 | End: 2021-08-05 | Stop reason: HOSPADM

## 2021-08-02 RX ORDER — IBUPROFEN 200 MG
24 TABLET ORAL
Status: DISCONTINUED | OUTPATIENT
Start: 2021-08-02 | End: 2021-08-05 | Stop reason: HOSPADM

## 2021-08-02 RX ORDER — IBUPROFEN 200 MG
16 TABLET ORAL
Status: DISCONTINUED | OUTPATIENT
Start: 2021-08-02 | End: 2021-08-05 | Stop reason: HOSPADM

## 2021-08-02 RX ORDER — ENOXAPARIN SODIUM 100 MG/ML
40 INJECTION SUBCUTANEOUS EVERY 24 HOURS
Status: DISCONTINUED | OUTPATIENT
Start: 2021-08-02 | End: 2021-08-05 | Stop reason: HOSPADM

## 2021-08-02 RX ORDER — SERTRALINE HYDROCHLORIDE 50 MG/1
50 TABLET, FILM COATED ORAL DAILY
Status: DISCONTINUED | OUTPATIENT
Start: 2021-08-03 | End: 2021-08-05 | Stop reason: HOSPADM

## 2021-08-02 RX ORDER — ACETAMINOPHEN 325 MG/1
650 TABLET ORAL EVERY 8 HOURS PRN
Status: DISCONTINUED | OUTPATIENT
Start: 2021-08-02 | End: 2021-08-05 | Stop reason: HOSPADM

## 2021-08-02 RX ADMIN — CEFEPIME 2 G: 2 INJECTION, POWDER, FOR SOLUTION INTRAVENOUS at 11:08

## 2021-08-02 RX ADMIN — IOHEXOL 75 ML: 350 INJECTION, SOLUTION INTRAVENOUS at 09:08

## 2021-08-02 RX ADMIN — ACYCLOVIR 400 MG: 200 CAPSULE ORAL at 11:08

## 2021-08-02 RX ADMIN — ENOXAPARIN SODIUM 40 MG: 100 INJECTION SUBCUTANEOUS at 11:08

## 2021-08-03 PROBLEM — J96.01 ACUTE HYPOXEMIC RESPIRATORY FAILURE: Status: ACTIVE | Noted: 2021-08-02

## 2021-08-03 PROBLEM — J18.9 COMMUNITY ACQUIRED PNEUMONIA OF RIGHT LOWER LOBE OF LUNG: Status: ACTIVE | Noted: 2021-08-03

## 2021-08-03 PROBLEM — J96.01 ACUTE HYPOXEMIC RESPIRATORY FAILURE: Status: RESOLVED | Noted: 2021-08-02 | Resolved: 2021-08-03

## 2021-08-03 LAB
ADENOVIRUS: NOT DETECTED
ALBUMIN SERPL BCP-MCNC: 3.1 G/DL (ref 3.5–5.2)
ALP SERPL-CCNC: 81 U/L (ref 55–135)
ALT SERPL W/O P-5'-P-CCNC: 23 U/L (ref 10–44)
ANION GAP SERPL CALC-SCNC: 11 MMOL/L (ref 8–16)
AST SERPL-CCNC: 12 U/L (ref 10–40)
BASOPHILS # BLD AUTO: 0.01 K/UL (ref 0–0.2)
BASOPHILS NFR BLD: 0.1 % (ref 0–1.9)
BILIRUB SERPL-MCNC: 0.9 MG/DL (ref 0.1–1)
BORDETELLA PARAPERTUSSIS (IS1001): NOT DETECTED
BORDETELLA PERTUSSIS (PTXP): NOT DETECTED
BUN SERPL-MCNC: 14 MG/DL (ref 8–23)
CALCIUM SERPL-MCNC: 9.1 MG/DL (ref 8.7–10.5)
CHLAMYDIA PNEUMONIAE: NOT DETECTED
CHLORIDE SERPL-SCNC: 102 MMOL/L (ref 95–110)
CO2 SERPL-SCNC: 21 MMOL/L (ref 23–29)
CORONAVIRUS 229E, COMMON COLD VIRUS: NOT DETECTED
CORONAVIRUS HKU1, COMMON COLD VIRUS: NOT DETECTED
CORONAVIRUS NL63, COMMON COLD VIRUS: NOT DETECTED
CORONAVIRUS OC43, COMMON COLD VIRUS: NOT DETECTED
CREAT SERPL-MCNC: 1.1 MG/DL (ref 0.5–1.4)
DIFFERENTIAL METHOD: ABNORMAL
EOSINOPHIL # BLD AUTO: 0 K/UL (ref 0–0.5)
EOSINOPHIL NFR BLD: 0.3 % (ref 0–8)
ERYTHROCYTE [DISTWIDTH] IN BLOOD BY AUTOMATED COUNT: 15.9 % (ref 11.5–14.5)
EST. GFR  (AFRICAN AMERICAN): 58 ML/MIN/1.73 M^2
EST. GFR  (NON AFRICAN AMERICAN): 50.3 ML/MIN/1.73 M^2
FLUBV RNA NPH QL NAA+NON-PROBE: NOT DETECTED
GLUCOSE SERPL-MCNC: 143 MG/DL (ref 70–110)
HCT VFR BLD AUTO: 34.8 % (ref 37–48.5)
HCV AB SERPL QL IA: NEGATIVE
HGB BLD-MCNC: 11.2 G/DL (ref 12–16)
HPIV1 RNA NPH QL NAA+NON-PROBE: NOT DETECTED
HPIV2 RNA NPH QL NAA+NON-PROBE: NOT DETECTED
HPIV3 RNA NPH QL NAA+NON-PROBE: NOT DETECTED
HPIV4 RNA NPH QL NAA+NON-PROBE: NOT DETECTED
HUMAN METAPNEUMOVIRUS: NOT DETECTED
IMM GRANULOCYTES # BLD AUTO: 0.04 K/UL (ref 0–0.04)
IMM GRANULOCYTES NFR BLD AUTO: 0.4 % (ref 0–0.5)
INFLUENZA A (SUBTYPES H1,H1-2009,H3): NOT DETECTED
LYMPHOCYTES # BLD AUTO: 0.3 K/UL (ref 1–4.8)
LYMPHOCYTES NFR BLD: 2.5 % (ref 18–48)
MAGNESIUM SERPL-MCNC: 1.9 MG/DL (ref 1.6–2.6)
MCH RBC QN AUTO: 26 PG (ref 27–31)
MCHC RBC AUTO-ENTMCNC: 32.2 G/DL (ref 32–36)
MCV RBC AUTO: 81 FL (ref 82–98)
MONOCYTES # BLD AUTO: 0.6 K/UL (ref 0.3–1)
MONOCYTES NFR BLD: 5.7 % (ref 4–15)
MYCOPLASMA PNEUMONIAE: NOT DETECTED
NEUTROPHILS # BLD AUTO: 9.9 K/UL (ref 1.8–7.7)
NEUTROPHILS NFR BLD: 91 % (ref 38–73)
NRBC BLD-RTO: 0 /100 WBC
PHOSPHATE SERPL-MCNC: 2.3 MG/DL (ref 2.7–4.5)
PLATELET # BLD AUTO: 97 K/UL (ref 150–450)
PMV BLD AUTO: ABNORMAL FL (ref 9.2–12.9)
POCT GLUCOSE: 102 MG/DL (ref 70–110)
POCT GLUCOSE: 132 MG/DL (ref 70–110)
POCT GLUCOSE: 173 MG/DL (ref 70–110)
POTASSIUM SERPL-SCNC: 3.7 MMOL/L (ref 3.5–5.1)
PROT SERPL-MCNC: 7.5 G/DL (ref 6–8.4)
RBC # BLD AUTO: 4.31 M/UL (ref 4–5.4)
RESPIRATORY INFECTION PANEL SOURCE: NORMAL
RSV RNA NPH QL NAA+NON-PROBE: NOT DETECTED
RV+EV RNA NPH QL NAA+NON-PROBE: NOT DETECTED
SODIUM SERPL-SCNC: 134 MMOL/L (ref 136–145)
WBC # BLD AUTO: 10.91 K/UL (ref 3.9–12.7)

## 2021-08-03 PROCEDURE — 25000003 PHARM REV CODE 250: Performed by: NURSE PRACTITIONER

## 2021-08-03 PROCEDURE — 25000003 PHARM REV CODE 250: Performed by: STUDENT IN AN ORGANIZED HEALTH CARE EDUCATION/TRAINING PROGRAM

## 2021-08-03 PROCEDURE — 99223 PR INITIAL HOSPITAL CARE,LEVL III: ICD-10-PCS | Mod: AI,BMT,GC, | Performed by: INTERNAL MEDICINE

## 2021-08-03 PROCEDURE — 85025 COMPLETE CBC W/AUTO DIFF WBC: CPT | Performed by: STUDENT IN AN ORGANIZED HEALTH CARE EDUCATION/TRAINING PROGRAM

## 2021-08-03 PROCEDURE — 97530 THERAPEUTIC ACTIVITIES: CPT

## 2021-08-03 PROCEDURE — 80053 COMPREHEN METABOLIC PANEL: CPT | Performed by: STUDENT IN AN ORGANIZED HEALTH CARE EDUCATION/TRAINING PROGRAM

## 2021-08-03 PROCEDURE — 97535 SELF CARE MNGMENT TRAINING: CPT

## 2021-08-03 PROCEDURE — 97165 OT EVAL LOW COMPLEX 30 MIN: CPT

## 2021-08-03 PROCEDURE — 87633 RESP VIRUS 12-25 TARGETS: CPT | Performed by: NURSE PRACTITIONER

## 2021-08-03 PROCEDURE — 63600175 PHARM REV CODE 636 W HCPCS: Performed by: STUDENT IN AN ORGANIZED HEALTH CARE EDUCATION/TRAINING PROGRAM

## 2021-08-03 PROCEDURE — 63700000 PHARM REV CODE 250 ALT 637 W/O HCPCS: Performed by: STUDENT IN AN ORGANIZED HEALTH CARE EDUCATION/TRAINING PROGRAM

## 2021-08-03 PROCEDURE — 87205 SMEAR GRAM STAIN: CPT | Performed by: STUDENT IN AN ORGANIZED HEALTH CARE EDUCATION/TRAINING PROGRAM

## 2021-08-03 PROCEDURE — 87070 CULTURE OTHR SPECIMN AEROBIC: CPT | Performed by: STUDENT IN AN ORGANIZED HEALTH CARE EDUCATION/TRAINING PROGRAM

## 2021-08-03 PROCEDURE — 83735 ASSAY OF MAGNESIUM: CPT | Performed by: STUDENT IN AN ORGANIZED HEALTH CARE EDUCATION/TRAINING PROGRAM

## 2021-08-03 PROCEDURE — 97161 PT EVAL LOW COMPLEX 20 MIN: CPT

## 2021-08-03 PROCEDURE — 20600001 HC STEP DOWN PRIVATE ROOM

## 2021-08-03 PROCEDURE — 36415 COLL VENOUS BLD VENIPUNCTURE: CPT | Performed by: STUDENT IN AN ORGANIZED HEALTH CARE EDUCATION/TRAINING PROGRAM

## 2021-08-03 PROCEDURE — 84100 ASSAY OF PHOSPHORUS: CPT | Performed by: STUDENT IN AN ORGANIZED HEALTH CARE EDUCATION/TRAINING PROGRAM

## 2021-08-03 PROCEDURE — 99223 1ST HOSP IP/OBS HIGH 75: CPT | Mod: AI,BMT,GC, | Performed by: INTERNAL MEDICINE

## 2021-08-03 RX ORDER — AZITHROMYCIN 250 MG/1
500 TABLET, FILM COATED ORAL
Status: COMPLETED | OUTPATIENT
Start: 2021-08-03 | End: 2021-08-05

## 2021-08-03 RX ORDER — SODIUM,POTASSIUM PHOSPHATES 280-250MG
1 POWDER IN PACKET (EA) ORAL EVERY 4 HOURS PRN
Status: DISCONTINUED | OUTPATIENT
Start: 2021-08-03 | End: 2021-08-05 | Stop reason: HOSPADM

## 2021-08-03 RX ORDER — INSULIN ASPART 100 [IU]/ML
0-5 INJECTION, SOLUTION INTRAVENOUS; SUBCUTANEOUS
Status: DISCONTINUED | OUTPATIENT
Start: 2021-08-03 | End: 2021-08-05 | Stop reason: HOSPADM

## 2021-08-03 RX ORDER — LANOLIN ALCOHOL/MO/W.PET/CERES
400 CREAM (GRAM) TOPICAL EVERY 4 HOURS PRN
Status: DISCONTINUED | OUTPATIENT
Start: 2021-08-03 | End: 2021-08-05 | Stop reason: HOSPADM

## 2021-08-03 RX ORDER — POTASSIUM CHLORIDE 20 MEQ/1
20 TABLET, EXTENDED RELEASE ORAL
Status: DISCONTINUED | OUTPATIENT
Start: 2021-08-03 | End: 2021-08-05 | Stop reason: HOSPADM

## 2021-08-03 RX ORDER — TRAMADOL HYDROCHLORIDE 50 MG/1
50 TABLET ORAL EVERY 6 HOURS PRN
Status: DISCONTINUED | OUTPATIENT
Start: 2021-08-03 | End: 2021-08-05 | Stop reason: HOSPADM

## 2021-08-03 RX ORDER — GABAPENTIN 300 MG/1
600 CAPSULE ORAL 2 TIMES DAILY
Status: DISCONTINUED | OUTPATIENT
Start: 2021-08-03 | End: 2021-08-05 | Stop reason: HOSPADM

## 2021-08-03 RX ORDER — SODIUM,POTASSIUM PHOSPHATES 280-250MG
2 POWDER IN PACKET (EA) ORAL EVERY 4 HOURS PRN
Status: DISCONTINUED | OUTPATIENT
Start: 2021-08-03 | End: 2021-08-05 | Stop reason: HOSPADM

## 2021-08-03 RX ORDER — LANOLIN ALCOHOL/MO/W.PET/CERES
800 CREAM (GRAM) TOPICAL EVERY 4 HOURS PRN
Status: DISCONTINUED | OUTPATIENT
Start: 2021-08-03 | End: 2021-08-05 | Stop reason: HOSPADM

## 2021-08-03 RX ADMIN — ASPIRIN 81 MG: 81 TABLET, COATED ORAL at 08:08

## 2021-08-03 RX ADMIN — TRAMADOL HYDROCHLORIDE 50 MG: 50 TABLET, COATED ORAL at 08:08

## 2021-08-03 RX ADMIN — GABAPENTIN 600 MG: 300 CAPSULE ORAL at 08:08

## 2021-08-03 RX ADMIN — POTASSIUM CHLORIDE 20 MEQ: 1500 TABLET, EXTENDED RELEASE ORAL at 09:08

## 2021-08-03 RX ADMIN — CEFEPIME 2 G: 2 INJECTION, POWDER, FOR SOLUTION INTRAVENOUS at 11:08

## 2021-08-03 RX ADMIN — Medication 400 MG: at 09:08

## 2021-08-03 RX ADMIN — Medication 400 MG: at 02:08

## 2021-08-03 RX ADMIN — ACYCLOVIR 400 MG: 200 CAPSULE ORAL at 08:08

## 2021-08-03 RX ADMIN — POTASSIUM & SODIUM PHOSPHATES POWDER PACK 280-160-250 MG 1 PACKET: 280-160-250 PACK at 09:08

## 2021-08-03 RX ADMIN — POTASSIUM & SODIUM PHOSPHATES POWDER PACK 280-160-250 MG 1 PACKET: 280-160-250 PACK at 06:08

## 2021-08-03 RX ADMIN — POTASSIUM & SODIUM PHOSPHATES POWDER PACK 280-160-250 MG 1 PACKET: 280-160-250 PACK at 08:08

## 2021-08-03 RX ADMIN — POTASSIUM & SODIUM PHOSPHATES POWDER PACK 280-160-250 MG 1 PACKET: 280-160-250 PACK at 02:08

## 2021-08-03 RX ADMIN — VANCOMYCIN HYDROCHLORIDE 2000 MG: 10 INJECTION, POWDER, LYOPHILIZED, FOR SOLUTION INTRAVENOUS at 12:08

## 2021-08-03 RX ADMIN — SERTRALINE HYDROCHLORIDE 50 MG: 50 TABLET, FILM COATED ORAL at 08:08

## 2021-08-03 RX ADMIN — AZITHROMYCIN MONOHYDRATE 500 MG: 250 TABLET ORAL at 12:08

## 2021-08-03 RX ADMIN — ENOXAPARIN SODIUM 40 MG: 100 INJECTION SUBCUTANEOUS at 06:08

## 2021-08-03 RX ADMIN — AMLODIPINE BESYLATE 5 MG: 5 TABLET ORAL at 08:08

## 2021-08-04 PROBLEM — C90.02 MULTIPLE MYELOMA IN RELAPSE: Status: ACTIVE | Noted: 2019-05-09

## 2021-08-04 LAB
ALBUMIN SERPL BCP-MCNC: 2.6 G/DL (ref 3.5–5.2)
ALP SERPL-CCNC: 66 U/L (ref 55–135)
ALT SERPL W/O P-5'-P-CCNC: 16 U/L (ref 10–44)
ANION GAP SERPL CALC-SCNC: 6 MMOL/L (ref 8–16)
AST SERPL-CCNC: 12 U/L (ref 10–40)
BASOPHILS # BLD AUTO: 0 K/UL (ref 0–0.2)
BASOPHILS NFR BLD: 0 % (ref 0–1.9)
BILIRUB SERPL-MCNC: 0.6 MG/DL (ref 0.1–1)
BUN SERPL-MCNC: 15 MG/DL (ref 8–23)
CALCIUM SERPL-MCNC: 8.8 MG/DL (ref 8.7–10.5)
CHLORIDE SERPL-SCNC: 101 MMOL/L (ref 95–110)
CO2 SERPL-SCNC: 26 MMOL/L (ref 23–29)
CREAT SERPL-MCNC: 1.1 MG/DL (ref 0.5–1.4)
DIFFERENTIAL METHOD: ABNORMAL
EOSINOPHIL # BLD AUTO: 0.1 K/UL (ref 0–0.5)
EOSINOPHIL NFR BLD: 1 % (ref 0–8)
ERYTHROCYTE [DISTWIDTH] IN BLOOD BY AUTOMATED COUNT: 16.4 % (ref 11.5–14.5)
EST. GFR  (AFRICAN AMERICAN): 58 ML/MIN/1.73 M^2
EST. GFR  (NON AFRICAN AMERICAN): 50.3 ML/MIN/1.73 M^2
GLUCOSE SERPL-MCNC: 127 MG/DL (ref 70–110)
HCT VFR BLD AUTO: 31.7 % (ref 37–48.5)
HGB BLD-MCNC: 10 G/DL (ref 12–16)
IMM GRANULOCYTES # BLD AUTO: 0.03 K/UL (ref 0–0.04)
IMM GRANULOCYTES NFR BLD AUTO: 0.6 % (ref 0–0.5)
LYMPHOCYTES # BLD AUTO: 0.3 K/UL (ref 1–4.8)
LYMPHOCYTES NFR BLD: 6.3 % (ref 18–48)
MAGNESIUM SERPL-MCNC: 2.2 MG/DL (ref 1.6–2.6)
MCH RBC QN AUTO: 26.1 PG (ref 27–31)
MCHC RBC AUTO-ENTMCNC: 31.5 G/DL (ref 32–36)
MCV RBC AUTO: 83 FL (ref 82–98)
MONOCYTES # BLD AUTO: 0.4 K/UL (ref 0.3–1)
MONOCYTES NFR BLD: 6.9 % (ref 4–15)
NEUTROPHILS # BLD AUTO: 4.5 K/UL (ref 1.8–7.7)
NEUTROPHILS NFR BLD: 85.2 % (ref 38–73)
NRBC BLD-RTO: 0 /100 WBC
PHOSPHATE SERPL-MCNC: 3.1 MG/DL (ref 2.7–4.5)
PLATELET # BLD AUTO: 100 K/UL (ref 150–450)
PMV BLD AUTO: ABNORMAL FL (ref 9.2–12.9)
POCT GLUCOSE: 110 MG/DL (ref 70–110)
POCT GLUCOSE: 124 MG/DL (ref 70–110)
POCT GLUCOSE: 79 MG/DL (ref 70–110)
POCT GLUCOSE: 95 MG/DL (ref 70–110)
POTASSIUM SERPL-SCNC: 4.1 MMOL/L (ref 3.5–5.1)
PROT SERPL-MCNC: 6.5 G/DL (ref 6–8.4)
RBC # BLD AUTO: 3.83 M/UL (ref 4–5.4)
SODIUM SERPL-SCNC: 133 MMOL/L (ref 136–145)
WBC # BLD AUTO: 5.23 K/UL (ref 3.9–12.7)

## 2021-08-04 PROCEDURE — 63600175 PHARM REV CODE 636 W HCPCS: Performed by: STUDENT IN AN ORGANIZED HEALTH CARE EDUCATION/TRAINING PROGRAM

## 2021-08-04 PROCEDURE — 20600001 HC STEP DOWN PRIVATE ROOM

## 2021-08-04 PROCEDURE — 83735 ASSAY OF MAGNESIUM: CPT | Performed by: STUDENT IN AN ORGANIZED HEALTH CARE EDUCATION/TRAINING PROGRAM

## 2021-08-04 PROCEDURE — 36415 COLL VENOUS BLD VENIPUNCTURE: CPT | Performed by: STUDENT IN AN ORGANIZED HEALTH CARE EDUCATION/TRAINING PROGRAM

## 2021-08-04 PROCEDURE — 80053 COMPREHEN METABOLIC PANEL: CPT | Performed by: STUDENT IN AN ORGANIZED HEALTH CARE EDUCATION/TRAINING PROGRAM

## 2021-08-04 PROCEDURE — 63700000 PHARM REV CODE 250 ALT 637 W/O HCPCS: Performed by: STUDENT IN AN ORGANIZED HEALTH CARE EDUCATION/TRAINING PROGRAM

## 2021-08-04 PROCEDURE — 25000003 PHARM REV CODE 250: Performed by: STUDENT IN AN ORGANIZED HEALTH CARE EDUCATION/TRAINING PROGRAM

## 2021-08-04 PROCEDURE — 97116 GAIT TRAINING THERAPY: CPT

## 2021-08-04 PROCEDURE — 99233 SBSQ HOSP IP/OBS HIGH 50: CPT | Mod: BMT,GC,, | Performed by: INTERNAL MEDICINE

## 2021-08-04 PROCEDURE — 85025 COMPLETE CBC W/AUTO DIFF WBC: CPT | Performed by: STUDENT IN AN ORGANIZED HEALTH CARE EDUCATION/TRAINING PROGRAM

## 2021-08-04 PROCEDURE — 25000003 PHARM REV CODE 250: Performed by: NURSE PRACTITIONER

## 2021-08-04 PROCEDURE — 84100 ASSAY OF PHOSPHORUS: CPT | Performed by: STUDENT IN AN ORGANIZED HEALTH CARE EDUCATION/TRAINING PROGRAM

## 2021-08-04 PROCEDURE — 99233 PR SUBSEQUENT HOSPITAL CARE,LEVL III: ICD-10-PCS | Mod: BMT,GC,, | Performed by: INTERNAL MEDICINE

## 2021-08-04 RX ORDER — LEVOFLOXACIN 750 MG/1
750 TABLET ORAL EVERY OTHER DAY
Qty: 2 TABLET | Refills: 0 | Status: SHIPPED | OUTPATIENT
Start: 2021-08-04 | End: 2021-08-09

## 2021-08-04 RX ADMIN — LEVOFLOXACIN 750 MG: 500 TABLET, FILM COATED ORAL at 01:08

## 2021-08-04 RX ADMIN — GABAPENTIN 600 MG: 300 CAPSULE ORAL at 10:08

## 2021-08-04 RX ADMIN — AMLODIPINE BESYLATE 5 MG: 5 TABLET ORAL at 09:08

## 2021-08-04 RX ADMIN — GABAPENTIN 600 MG: 300 CAPSULE ORAL at 09:08

## 2021-08-04 RX ADMIN — AZITHROMYCIN MONOHYDRATE 500 MG: 250 TABLET ORAL at 01:08

## 2021-08-04 RX ADMIN — ACYCLOVIR 400 MG: 200 CAPSULE ORAL at 09:08

## 2021-08-04 RX ADMIN — SERTRALINE HYDROCHLORIDE 50 MG: 50 TABLET, FILM COATED ORAL at 09:08

## 2021-08-04 RX ADMIN — VANCOMYCIN HYDROCHLORIDE 1250 MG: 1.25 INJECTION, POWDER, LYOPHILIZED, FOR SOLUTION INTRAVENOUS at 01:08

## 2021-08-04 RX ADMIN — ENOXAPARIN SODIUM 40 MG: 100 INJECTION SUBCUTANEOUS at 05:08

## 2021-08-04 RX ADMIN — ASPIRIN 81 MG: 81 TABLET, COATED ORAL at 09:08

## 2021-08-04 RX ADMIN — ACYCLOVIR 400 MG: 200 CAPSULE ORAL at 10:08

## 2021-08-05 VITALS
WEIGHT: 175.94 LBS | OXYGEN SATURATION: 93 % | BODY MASS INDEX: 31.17 KG/M2 | HEIGHT: 63 IN | HEART RATE: 89 BPM | RESPIRATION RATE: 18 BRPM | DIASTOLIC BLOOD PRESSURE: 83 MMHG | TEMPERATURE: 99 F | SYSTOLIC BLOOD PRESSURE: 115 MMHG

## 2021-08-05 LAB
ALBUMIN SERPL BCP-MCNC: 2.6 G/DL (ref 3.5–5.2)
ALP SERPL-CCNC: 61 U/L (ref 55–135)
ALT SERPL W/O P-5'-P-CCNC: 16 U/L (ref 10–44)
ANION GAP SERPL CALC-SCNC: 4 MMOL/L (ref 8–16)
AST SERPL-CCNC: 9 U/L (ref 10–40)
BACTERIA SPEC AEROBE CULT: NORMAL
BACTERIA SPEC AEROBE CULT: NORMAL
BASOPHILS # BLD AUTO: 0.01 K/UL (ref 0–0.2)
BASOPHILS NFR BLD: 0.3 % (ref 0–1.9)
BILIRUB SERPL-MCNC: 0.5 MG/DL (ref 0.1–1)
BUN SERPL-MCNC: 16 MG/DL (ref 8–23)
CALCIUM SERPL-MCNC: 8.8 MG/DL (ref 8.7–10.5)
CHLORIDE SERPL-SCNC: 100 MMOL/L (ref 95–110)
CO2 SERPL-SCNC: 27 MMOL/L (ref 23–29)
CREAT SERPL-MCNC: 1.1 MG/DL (ref 0.5–1.4)
DIFFERENTIAL METHOD: ABNORMAL
EOSINOPHIL # BLD AUTO: 0.1 K/UL (ref 0–0.5)
EOSINOPHIL NFR BLD: 1.6 % (ref 0–8)
ERYTHROCYTE [DISTWIDTH] IN BLOOD BY AUTOMATED COUNT: 15.9 % (ref 11.5–14.5)
EST. GFR  (AFRICAN AMERICAN): 58 ML/MIN/1.73 M^2
EST. GFR  (NON AFRICAN AMERICAN): 50.3 ML/MIN/1.73 M^2
GLUCOSE SERPL-MCNC: 109 MG/DL (ref 70–110)
GRAM STN SPEC: NORMAL
HCT VFR BLD AUTO: 29.2 % (ref 37–48.5)
HGB BLD-MCNC: 9.5 G/DL (ref 12–16)
IMM GRANULOCYTES # BLD AUTO: 0.01 K/UL (ref 0–0.04)
IMM GRANULOCYTES NFR BLD AUTO: 0.3 % (ref 0–0.5)
LYMPHOCYTES # BLD AUTO: 0.2 K/UL (ref 1–4.8)
LYMPHOCYTES NFR BLD: 5.4 % (ref 18–48)
MAGNESIUM SERPL-MCNC: 2.1 MG/DL (ref 1.6–2.6)
MCH RBC QN AUTO: 26.5 PG (ref 27–31)
MCHC RBC AUTO-ENTMCNC: 32.5 G/DL (ref 32–36)
MCV RBC AUTO: 82 FL (ref 82–98)
MONOCYTES # BLD AUTO: 0.5 K/UL (ref 0.3–1)
MONOCYTES NFR BLD: 12.5 % (ref 4–15)
NEUTROPHILS # BLD AUTO: 2.9 K/UL (ref 1.8–7.7)
NEUTROPHILS NFR BLD: 79.9 % (ref 38–73)
NRBC BLD-RTO: 0 /100 WBC
PHOSPHATE SERPL-MCNC: 3.7 MG/DL (ref 2.7–4.5)
PLATELET # BLD AUTO: 145 K/UL (ref 150–450)
PMV BLD AUTO: 11.7 FL (ref 9.2–12.9)
POCT GLUCOSE: 79 MG/DL (ref 70–110)
POCT GLUCOSE: 95 MG/DL (ref 70–110)
POTASSIUM SERPL-SCNC: 3.9 MMOL/L (ref 3.5–5.1)
PROT SERPL-MCNC: 6.3 G/DL (ref 6–8.4)
RBC # BLD AUTO: 3.58 M/UL (ref 4–5.4)
SODIUM SERPL-SCNC: 131 MMOL/L (ref 136–145)
WBC # BLD AUTO: 3.67 K/UL (ref 3.9–12.7)

## 2021-08-05 PROCEDURE — 1111F DSCHRG MED/CURRENT MED MERGE: CPT | Mod: BMT,CPTII,GC, | Performed by: INTERNAL MEDICINE

## 2021-08-05 PROCEDURE — 99238 PR HOSPITAL DISCHARGE DAY,<30 MIN: ICD-10-PCS | Mod: BMT,GC,, | Performed by: INTERNAL MEDICINE

## 2021-08-05 PROCEDURE — 63700000 PHARM REV CODE 250 ALT 637 W/O HCPCS: Performed by: STUDENT IN AN ORGANIZED HEALTH CARE EDUCATION/TRAINING PROGRAM

## 2021-08-05 PROCEDURE — 25000003 PHARM REV CODE 250: Performed by: STUDENT IN AN ORGANIZED HEALTH CARE EDUCATION/TRAINING PROGRAM

## 2021-08-05 PROCEDURE — 85025 COMPLETE CBC W/AUTO DIFF WBC: CPT | Performed by: STUDENT IN AN ORGANIZED HEALTH CARE EDUCATION/TRAINING PROGRAM

## 2021-08-05 PROCEDURE — 83735 ASSAY OF MAGNESIUM: CPT | Performed by: STUDENT IN AN ORGANIZED HEALTH CARE EDUCATION/TRAINING PROGRAM

## 2021-08-05 PROCEDURE — 1111F PR DISCHARGE MEDS RECONCILED W/ CURRENT OUTPATIENT MED LIST: ICD-10-PCS | Mod: BMT,CPTII,GC, | Performed by: INTERNAL MEDICINE

## 2021-08-05 PROCEDURE — 36415 COLL VENOUS BLD VENIPUNCTURE: CPT | Performed by: STUDENT IN AN ORGANIZED HEALTH CARE EDUCATION/TRAINING PROGRAM

## 2021-08-05 PROCEDURE — 97530 THERAPEUTIC ACTIVITIES: CPT

## 2021-08-05 PROCEDURE — 99238 HOSP IP/OBS DSCHRG MGMT 30/<: CPT | Mod: BMT,GC,, | Performed by: INTERNAL MEDICINE

## 2021-08-05 PROCEDURE — 84100 ASSAY OF PHOSPHORUS: CPT | Performed by: STUDENT IN AN ORGANIZED HEALTH CARE EDUCATION/TRAINING PROGRAM

## 2021-08-05 PROCEDURE — 80053 COMPREHEN METABOLIC PANEL: CPT | Performed by: STUDENT IN AN ORGANIZED HEALTH CARE EDUCATION/TRAINING PROGRAM

## 2021-08-05 RX ADMIN — ACYCLOVIR 400 MG: 200 CAPSULE ORAL at 08:08

## 2021-08-05 RX ADMIN — AMLODIPINE BESYLATE 5 MG: 5 TABLET ORAL at 01:08

## 2021-08-05 RX ADMIN — AZITHROMYCIN MONOHYDRATE 500 MG: 250 TABLET ORAL at 01:08

## 2021-08-05 RX ADMIN — GABAPENTIN 600 MG: 300 CAPSULE ORAL at 08:08

## 2021-08-05 RX ADMIN — ASPIRIN 81 MG: 81 TABLET, COATED ORAL at 08:08

## 2021-08-05 RX ADMIN — SERTRALINE HYDROCHLORIDE 50 MG: 50 TABLET, FILM COATED ORAL at 08:08

## 2021-08-06 PROCEDURE — G0180 PR HOME HEALTH MD CERTIFICATION: ICD-10-PCS | Mod: ,,, | Performed by: INTERNAL MEDICINE

## 2021-08-06 PROCEDURE — G0180 MD CERTIFICATION HHA PATIENT: HCPCS | Mod: ,,, | Performed by: INTERNAL MEDICINE

## 2021-08-11 DIAGNOSIS — C90.02 MULTIPLE MYELOMA IN RELAPSE: Primary | ICD-10-CM

## 2021-08-12 ENCOUNTER — INFUSION (OUTPATIENT)
Dept: INFUSION THERAPY | Facility: HOSPITAL | Age: 71
End: 2021-08-12
Payer: MEDICARE

## 2021-08-12 ENCOUNTER — OFFICE VISIT (OUTPATIENT)
Dept: HEMATOLOGY/ONCOLOGY | Facility: CLINIC | Age: 71
End: 2021-08-12
Payer: MEDICARE

## 2021-08-12 VITALS
HEART RATE: 65 BPM | OXYGEN SATURATION: 96 % | WEIGHT: 175.13 LBS | BODY MASS INDEX: 31.03 KG/M2 | HEIGHT: 63 IN | DIASTOLIC BLOOD PRESSURE: 71 MMHG | TEMPERATURE: 99 F | RESPIRATION RATE: 20 BRPM | SYSTOLIC BLOOD PRESSURE: 128 MMHG

## 2021-08-12 VITALS
HEART RATE: 72 BPM | TEMPERATURE: 98 F | SYSTOLIC BLOOD PRESSURE: 120 MMHG | DIASTOLIC BLOOD PRESSURE: 61 MMHG | RESPIRATION RATE: 18 BRPM | OXYGEN SATURATION: 97 %

## 2021-08-12 DIAGNOSIS — Z85.3 HISTORY OF LEFT BREAST CANCER: ICD-10-CM

## 2021-08-12 DIAGNOSIS — C80.1 NEUROPATHY ASSOCIATED WITH CANCER: ICD-10-CM

## 2021-08-12 DIAGNOSIS — I10 ESSENTIAL HYPERTENSION: ICD-10-CM

## 2021-08-12 DIAGNOSIS — C90.01 MULTIPLE MYELOMA IN REMISSION: Primary | ICD-10-CM

## 2021-08-12 DIAGNOSIS — I69.30 HISTORY OF CVA WITH RESIDUAL DEFICIT: ICD-10-CM

## 2021-08-12 DIAGNOSIS — E11.9 TYPE 2 DIABETES MELLITUS WITHOUT COMPLICATION, WITHOUT LONG-TERM CURRENT USE OF INSULIN: ICD-10-CM

## 2021-08-12 DIAGNOSIS — E78.00 PURE HYPERCHOLESTEROLEMIA: ICD-10-CM

## 2021-08-12 DIAGNOSIS — R06.00 DYSPNEA, UNSPECIFIED TYPE: ICD-10-CM

## 2021-08-12 DIAGNOSIS — F41.9 ANXIETY AND DEPRESSION: ICD-10-CM

## 2021-08-12 DIAGNOSIS — G63 NEUROPATHY ASSOCIATED WITH CANCER: ICD-10-CM

## 2021-08-12 DIAGNOSIS — Z94.84 HISTORY OF AUTO STEM CELL TRANSPLANT: ICD-10-CM

## 2021-08-12 DIAGNOSIS — T50.905A DRUG-INDUCED CYTOPENIA: ICD-10-CM

## 2021-08-12 DIAGNOSIS — R05.9 COUGH IN ADULT: Primary | ICD-10-CM

## 2021-08-12 DIAGNOSIS — F32.A ANXIETY AND DEPRESSION: ICD-10-CM

## 2021-08-12 DIAGNOSIS — C90.00 MULTIPLE MYELOMA, REMISSION STATUS UNSPECIFIED: ICD-10-CM

## 2021-08-12 DIAGNOSIS — D75.9 DRUG-INDUCED CYTOPENIA: ICD-10-CM

## 2021-08-12 DIAGNOSIS — C90.02 MULTIPLE MYELOMA IN RELAPSE: ICD-10-CM

## 2021-08-12 DIAGNOSIS — J18.9 COMMUNITY ACQUIRED PNEUMONIA OF RIGHT LOWER LOBE OF LUNG: ICD-10-CM

## 2021-08-12 PROCEDURE — 99999 PR PBB SHADOW E&M-EST. PATIENT-LVL V: ICD-10-PCS | Mod: PBBFAC,BMT,, | Performed by: NURSE PRACTITIONER

## 2021-08-12 PROCEDURE — 3078F PR MOST RECENT DIASTOLIC BLOOD PRESSURE < 80 MM HG: ICD-10-PCS | Mod: BMT,CPTII,S$GLB, | Performed by: NURSE PRACTITIONER

## 2021-08-12 PROCEDURE — 96409 CHEMO IV PUSH SNGL DRUG: CPT

## 2021-08-12 PROCEDURE — 99215 OFFICE O/P EST HI 40 MIN: CPT | Mod: BMT,S$GLB,, | Performed by: NURSE PRACTITIONER

## 2021-08-12 PROCEDURE — 3008F PR BODY MASS INDEX (BMI) DOCUMENTED: ICD-10-PCS | Mod: BMT,CPTII,S$GLB, | Performed by: NURSE PRACTITIONER

## 2021-08-12 PROCEDURE — 1125F PR PAIN SEVERITY QUANTIFIED, PAIN PRESENT: ICD-10-PCS | Mod: BMT,CPTII,S$GLB, | Performed by: NURSE PRACTITIONER

## 2021-08-12 PROCEDURE — 1101F PR PT FALLS ASSESS DOC 0-1 FALLS W/OUT INJ PAST YR: ICD-10-PCS | Mod: BMT,CPTII,S$GLB, | Performed by: NURSE PRACTITIONER

## 2021-08-12 PROCEDURE — 1159F PR MEDICATION LIST DOCUMENTED IN MEDICAL RECORD: ICD-10-PCS | Mod: BMT,CPTII,S$GLB, | Performed by: NURSE PRACTITIONER

## 2021-08-12 PROCEDURE — 63600175 PHARM REV CODE 636 W HCPCS: Performed by: INTERNAL MEDICINE

## 2021-08-12 PROCEDURE — 1160F RVW MEDS BY RX/DR IN RCRD: CPT | Mod: BMT,CPTII,S$GLB, | Performed by: NURSE PRACTITIONER

## 2021-08-12 PROCEDURE — 25000003 PHARM REV CODE 250: Performed by: INTERNAL MEDICINE

## 2021-08-12 PROCEDURE — 3078F DIAST BP <80 MM HG: CPT | Mod: BMT,CPTII,S$GLB, | Performed by: NURSE PRACTITIONER

## 2021-08-12 PROCEDURE — 3288F PR FALLS RISK ASSESSMENT DOCUMENTED: ICD-10-PCS | Mod: BMT,CPTII,S$GLB, | Performed by: NURSE PRACTITIONER

## 2021-08-12 PROCEDURE — 96417 CHEMO IV INFUS EACH ADDL SEQ: CPT

## 2021-08-12 PROCEDURE — 99999 PR PBB SHADOW E&M-EST. PATIENT-LVL V: CPT | Mod: PBBFAC,BMT,, | Performed by: NURSE PRACTITIONER

## 2021-08-12 PROCEDURE — 3072F LOW RISK FOR RETINOPATHY: CPT | Mod: BMT,CPTII,S$GLB, | Performed by: NURSE PRACTITIONER

## 2021-08-12 PROCEDURE — 3051F PR MOST RECENT HEMOGLOBIN A1C LEVEL 7.0 - < 8.0%: ICD-10-PCS | Mod: BMT,CPTII,S$GLB, | Performed by: NURSE PRACTITIONER

## 2021-08-12 PROCEDURE — 1159F MED LIST DOCD IN RCRD: CPT | Mod: BMT,CPTII,S$GLB, | Performed by: NURSE PRACTITIONER

## 2021-08-12 PROCEDURE — 1111F DSCHRG MED/CURRENT MED MERGE: CPT | Mod: BMT,CPTII,S$GLB, | Performed by: NURSE PRACTITIONER

## 2021-08-12 PROCEDURE — 1160F PR REVIEW ALL MEDS BY PRESCRIBER/CLIN PHARMACIST DOCUMENTED: ICD-10-PCS | Mod: BMT,CPTII,S$GLB, | Performed by: NURSE PRACTITIONER

## 2021-08-12 PROCEDURE — 1125F AMNT PAIN NOTED PAIN PRSNT: CPT | Mod: BMT,CPTII,S$GLB, | Performed by: NURSE PRACTITIONER

## 2021-08-12 PROCEDURE — 99215 PR OFFICE/OUTPT VISIT, EST, LEVL V, 40-54 MIN: ICD-10-PCS | Mod: BMT,S$GLB,, | Performed by: NURSE PRACTITIONER

## 2021-08-12 PROCEDURE — 3074F SYST BP LT 130 MM HG: CPT | Mod: BMT,CPTII,S$GLB, | Performed by: NURSE PRACTITIONER

## 2021-08-12 PROCEDURE — 1101F PT FALLS ASSESS-DOCD LE1/YR: CPT | Mod: BMT,CPTII,S$GLB, | Performed by: NURSE PRACTITIONER

## 2021-08-12 PROCEDURE — 3051F HG A1C>EQUAL 7.0%<8.0%: CPT | Mod: BMT,CPTII,S$GLB, | Performed by: NURSE PRACTITIONER

## 2021-08-12 PROCEDURE — 3074F PR MOST RECENT SYSTOLIC BLOOD PRESSURE < 130 MM HG: ICD-10-PCS | Mod: BMT,CPTII,S$GLB, | Performed by: NURSE PRACTITIONER

## 2021-08-12 PROCEDURE — 3288F FALL RISK ASSESSMENT DOCD: CPT | Mod: BMT,CPTII,S$GLB, | Performed by: NURSE PRACTITIONER

## 2021-08-12 PROCEDURE — 3072F PR LOW RISK FOR RETINOPATHY: ICD-10-PCS | Mod: BMT,CPTII,S$GLB, | Performed by: NURSE PRACTITIONER

## 2021-08-12 PROCEDURE — 3008F BODY MASS INDEX DOCD: CPT | Mod: BMT,CPTII,S$GLB, | Performed by: NURSE PRACTITIONER

## 2021-08-12 PROCEDURE — 1111F PR DISCHARGE MEDS RECONCILED W/ CURRENT OUTPATIENT MED LIST: ICD-10-PCS | Mod: BMT,CPTII,S$GLB, | Performed by: NURSE PRACTITIONER

## 2021-08-12 RX ORDER — ZOLEDRONIC ACID 0.04 MG/ML
INJECTION, SOLUTION INTRAVENOUS
COMMUNITY
Start: 2021-04-01

## 2021-08-12 RX ORDER — DIPHENHYDRAMINE HCL 25 MG
25 CAPSULE ORAL
Status: COMPLETED | OUTPATIENT
Start: 2021-08-12 | End: 2021-08-12

## 2021-08-12 RX ORDER — DIPHENHYDRAMINE HYDROCHLORIDE 50 MG/ML
50 INJECTION INTRAMUSCULAR; INTRAVENOUS ONCE AS NEEDED
Status: DISCONTINUED | OUTPATIENT
Start: 2021-08-12 | End: 2021-08-12 | Stop reason: HOSPADM

## 2021-08-12 RX ORDER — DEXAMETHASONE 4 MG/1
20 TABLET ORAL
Status: COMPLETED | OUTPATIENT
Start: 2021-08-12 | End: 2021-08-12

## 2021-08-12 RX ORDER — ACETAMINOPHEN 325 MG/1
650 TABLET ORAL
Status: COMPLETED | OUTPATIENT
Start: 2021-08-12 | End: 2021-08-12

## 2021-08-12 RX ORDER — BORTEZOMIB 3.5 MG/1
1.3 INJECTION, POWDER, LYOPHILIZED, FOR SOLUTION INTRAVENOUS; SUBCUTANEOUS
Status: COMPLETED | OUTPATIENT
Start: 2021-08-12 | End: 2021-08-12

## 2021-08-12 RX ORDER — EPINEPHRINE 0.3 MG/.3ML
0.3 INJECTION SUBCUTANEOUS ONCE AS NEEDED
Status: DISCONTINUED | OUTPATIENT
Start: 2021-08-12 | End: 2021-08-12 | Stop reason: HOSPADM

## 2021-08-12 RX ORDER — GUAIFENESIN 600 MG/1
600 TABLET, EXTENDED RELEASE ORAL 2 TIMES DAILY
Qty: 30 TABLET | Refills: 0 | Status: SHIPPED | OUTPATIENT
Start: 2021-08-12 | End: 2022-08-12

## 2021-08-12 RX ADMIN — DIPHENHYDRAMINE HYDROCHLORIDE 25 MG: 25 CAPSULE ORAL at 02:08

## 2021-08-12 RX ADMIN — DEXAMETHASONE 20 MG: 4 TABLET ORAL at 02:08

## 2021-08-12 RX ADMIN — DARATUMUMAB AND HYALURONIDASE-FIHJ (HUMAN RECOMBINANT) 1800 MG: 1800; 30000 INJECTION SUBCUTANEOUS at 03:08

## 2021-08-12 RX ADMIN — BORTEZOMIB 2.5 MG: 3.5 INJECTION, POWDER, LYOPHILIZED, FOR SOLUTION INTRAVENOUS; SUBCUTANEOUS at 02:08

## 2021-08-12 RX ADMIN — ACETAMINOPHEN 650 MG: 325 TABLET ORAL at 02:08

## 2021-08-17 ENCOUNTER — TELEPHONE (OUTPATIENT)
Dept: HEMATOLOGY/ONCOLOGY | Facility: CLINIC | Age: 71
End: 2021-08-17

## 2021-08-19 ENCOUNTER — INFUSION (OUTPATIENT)
Dept: INFUSION THERAPY | Facility: HOSPITAL | Age: 71
End: 2021-08-19
Payer: MEDICARE

## 2021-08-19 VITALS
SYSTOLIC BLOOD PRESSURE: 121 MMHG | RESPIRATION RATE: 18 BRPM | HEIGHT: 63 IN | DIASTOLIC BLOOD PRESSURE: 71 MMHG | TEMPERATURE: 99 F | BODY MASS INDEX: 31.13 KG/M2 | WEIGHT: 175.69 LBS | HEART RATE: 65 BPM

## 2021-08-19 DIAGNOSIS — C90.01 MULTIPLE MYELOMA IN REMISSION: Primary | ICD-10-CM

## 2021-08-19 DIAGNOSIS — C90.02 MULTIPLE MYELOMA IN RELAPSE: ICD-10-CM

## 2021-08-19 PROCEDURE — 96401 CHEMO ANTI-NEOPL SQ/IM: CPT

## 2021-08-19 PROCEDURE — 63600175 PHARM REV CODE 636 W HCPCS: Performed by: NURSE PRACTITIONER

## 2021-08-19 PROCEDURE — 25000003 PHARM REV CODE 250: Performed by: NURSE PRACTITIONER

## 2021-08-19 PROCEDURE — 63600175 PHARM REV CODE 636 W HCPCS: Mod: JW,JG | Performed by: INTERNAL MEDICINE

## 2021-08-19 RX ORDER — DIPHENHYDRAMINE HYDROCHLORIDE 50 MG/ML
50 INJECTION INTRAMUSCULAR; INTRAVENOUS ONCE AS NEEDED
Status: CANCELLED | OUTPATIENT
Start: 2021-08-26

## 2021-08-19 RX ORDER — DEXAMETHASONE 4 MG/1
20 TABLET ORAL
Status: CANCELLED | OUTPATIENT
Start: 2021-08-19

## 2021-08-19 RX ORDER — BORTEZOMIB 3.5 MG/1
1.3 INJECTION, POWDER, LYOPHILIZED, FOR SOLUTION INTRAVENOUS; SUBCUTANEOUS
Status: CANCELLED | OUTPATIENT
Start: 2021-08-26

## 2021-08-19 RX ORDER — ACETAMINOPHEN 325 MG/1
650 TABLET ORAL
Status: CANCELLED | OUTPATIENT
Start: 2021-08-26

## 2021-08-19 RX ORDER — EPINEPHRINE 0.3 MG/.3ML
0.3 INJECTION SUBCUTANEOUS ONCE AS NEEDED
Status: CANCELLED | OUTPATIENT
Start: 2021-08-26

## 2021-08-19 RX ORDER — DIPHENHYDRAMINE HYDROCHLORIDE 50 MG/ML
50 INJECTION INTRAMUSCULAR; INTRAVENOUS ONCE AS NEEDED
Status: CANCELLED | OUTPATIENT
Start: 2021-08-19

## 2021-08-19 RX ORDER — DEXAMETHASONE 4 MG/1
20 TABLET ORAL
Status: COMPLETED | OUTPATIENT
Start: 2021-08-19 | End: 2021-08-19

## 2021-08-19 RX ORDER — DIPHENHYDRAMINE HCL 25 MG
25 CAPSULE ORAL
Status: COMPLETED | OUTPATIENT
Start: 2021-08-19 | End: 2021-08-19

## 2021-08-19 RX ORDER — EPINEPHRINE 0.3 MG/.3ML
0.3 INJECTION SUBCUTANEOUS ONCE AS NEEDED
Status: DISCONTINUED | OUTPATIENT
Start: 2021-08-19 | End: 2021-08-19 | Stop reason: HOSPADM

## 2021-08-19 RX ORDER — EPINEPHRINE 0.3 MG/.3ML
0.3 INJECTION SUBCUTANEOUS ONCE AS NEEDED
Status: CANCELLED | OUTPATIENT
Start: 2021-08-19

## 2021-08-19 RX ORDER — ACETAMINOPHEN 325 MG/1
650 TABLET ORAL
Status: COMPLETED | OUTPATIENT
Start: 2021-08-19 | End: 2021-08-19

## 2021-08-19 RX ORDER — DIPHENHYDRAMINE HYDROCHLORIDE 50 MG/ML
50 INJECTION INTRAMUSCULAR; INTRAVENOUS ONCE AS NEEDED
Status: DISCONTINUED | OUTPATIENT
Start: 2021-08-19 | End: 2021-08-19 | Stop reason: HOSPADM

## 2021-08-19 RX ORDER — ACETAMINOPHEN 325 MG/1
650 TABLET ORAL
Status: CANCELLED | OUTPATIENT
Start: 2021-08-19

## 2021-08-19 RX ORDER — DIPHENHYDRAMINE HCL 25 MG
25 CAPSULE ORAL
Status: CANCELLED | OUTPATIENT
Start: 2021-08-26

## 2021-08-19 RX ORDER — DEXAMETHASONE 4 MG/1
20 TABLET ORAL
Status: CANCELLED | OUTPATIENT
Start: 2021-08-26

## 2021-08-19 RX ORDER — BORTEZOMIB 3.5 MG/1
1.3 INJECTION, POWDER, LYOPHILIZED, FOR SOLUTION INTRAVENOUS; SUBCUTANEOUS
Status: CANCELLED | OUTPATIENT
Start: 2021-08-19

## 2021-08-19 RX ORDER — BORTEZOMIB 3.5 MG/1
1.3 INJECTION, POWDER, LYOPHILIZED, FOR SOLUTION INTRAVENOUS; SUBCUTANEOUS
Status: COMPLETED | OUTPATIENT
Start: 2021-08-19 | End: 2021-08-19

## 2021-08-19 RX ORDER — DIPHENHYDRAMINE HCL 25 MG
25 CAPSULE ORAL
Status: CANCELLED | OUTPATIENT
Start: 2021-08-19

## 2021-08-19 RX ADMIN — ACETAMINOPHEN 650 MG: 325 TABLET ORAL at 08:08

## 2021-08-19 RX ADMIN — BORTEZOMIB 2.5 MG: 3.5 INJECTION, POWDER, LYOPHILIZED, FOR SOLUTION INTRAVENOUS; SUBCUTANEOUS at 09:08

## 2021-08-19 RX ADMIN — DARATUMUMAB AND HYALURONIDASE-FIHJ (HUMAN RECOMBINANT) 1800 MG: 1800; 30000 INJECTION SUBCUTANEOUS at 09:08

## 2021-08-19 RX ADMIN — DIPHENHYDRAMINE HYDROCHLORIDE 25 MG: 25 CAPSULE ORAL at 08:08

## 2021-08-19 RX ADMIN — DEXAMETHASONE 20 MG: 4 TABLET ORAL at 08:08

## 2021-08-19 NOTE — ASSESSMENT & PLAN NOTE
-vascular per recent MRI in 09/2019 with no changes; seen by hepatology pre-transplant and signed off; recommend repeat MRI in 6 months    Attending Only

## 2021-08-23 ENCOUNTER — SSC ENCOUNTER (OUTPATIENT)
Dept: ADMINISTRATIVE | Facility: OTHER | Age: 71
End: 2021-08-23

## 2021-08-26 ENCOUNTER — INFUSION (OUTPATIENT)
Dept: INFUSION THERAPY | Facility: HOSPITAL | Age: 71
End: 2021-08-26
Payer: MEDICARE

## 2021-08-26 VITALS
TEMPERATURE: 99 F | HEART RATE: 72 BPM | OXYGEN SATURATION: 93 % | DIASTOLIC BLOOD PRESSURE: 67 MMHG | SYSTOLIC BLOOD PRESSURE: 125 MMHG | RESPIRATION RATE: 18 BRPM

## 2021-08-26 DIAGNOSIS — C90.01 MULTIPLE MYELOMA IN REMISSION: Primary | ICD-10-CM

## 2021-08-26 DIAGNOSIS — C90.02 MULTIPLE MYELOMA IN RELAPSE: ICD-10-CM

## 2021-08-26 PROCEDURE — 96401 CHEMO ANTI-NEOPL SQ/IM: CPT

## 2021-08-26 PROCEDURE — 63600175 PHARM REV CODE 636 W HCPCS: Mod: TB | Performed by: INTERNAL MEDICINE

## 2021-08-26 PROCEDURE — 25000003 PHARM REV CODE 250: Performed by: INTERNAL MEDICINE

## 2021-08-26 RX ORDER — DIPHENHYDRAMINE HYDROCHLORIDE 50 MG/ML
50 INJECTION INTRAMUSCULAR; INTRAVENOUS ONCE AS NEEDED
Status: DISCONTINUED | OUTPATIENT
Start: 2021-08-26 | End: 2021-08-26 | Stop reason: HOSPADM

## 2021-08-26 RX ORDER — EPINEPHRINE 0.3 MG/.3ML
0.3 INJECTION SUBCUTANEOUS ONCE AS NEEDED
Status: DISCONTINUED | OUTPATIENT
Start: 2021-08-26 | End: 2021-08-26 | Stop reason: HOSPADM

## 2021-08-26 RX ORDER — BORTEZOMIB 3.5 MG/1
1.3 INJECTION, POWDER, LYOPHILIZED, FOR SOLUTION INTRAVENOUS; SUBCUTANEOUS
Status: COMPLETED | OUTPATIENT
Start: 2021-08-26 | End: 2021-08-26

## 2021-08-26 RX ORDER — DEXAMETHASONE 4 MG/1
20 TABLET ORAL
Status: COMPLETED | OUTPATIENT
Start: 2021-08-26 | End: 2021-08-26

## 2021-08-26 RX ORDER — ACETAMINOPHEN 325 MG/1
650 TABLET ORAL
Status: COMPLETED | OUTPATIENT
Start: 2021-08-26 | End: 2021-08-26

## 2021-08-26 RX ORDER — DIPHENHYDRAMINE HCL 25 MG
25 CAPSULE ORAL
Status: COMPLETED | OUTPATIENT
Start: 2021-08-26 | End: 2021-08-26

## 2021-08-26 RX ADMIN — DIPHENHYDRAMINE HYDROCHLORIDE 25 MG: 25 CAPSULE ORAL at 09:08

## 2021-08-26 RX ADMIN — DARATUMUMAB AND HYALURONIDASE-FIHJ (HUMAN RECOMBINANT) 1800 MG: 1800; 30000 INJECTION SUBCUTANEOUS at 10:08

## 2021-08-26 RX ADMIN — ACETAMINOPHEN 650 MG: 325 TABLET ORAL at 09:08

## 2021-08-26 RX ADMIN — DEXAMETHASONE 20 MG: 4 TABLET ORAL at 09:08

## 2021-08-26 RX ADMIN — BORTEZOMIB 2.5 MG: 3.5 INJECTION, POWDER, LYOPHILIZED, FOR SOLUTION INTRAVENOUS; SUBCUTANEOUS at 10:08

## 2021-09-08 ENCOUNTER — TELEPHONE (OUTPATIENT)
Dept: HEMATOLOGY/ONCOLOGY | Facility: CLINIC | Age: 71
End: 2021-09-08

## 2021-09-08 ENCOUNTER — EXTERNAL HOME HEALTH (OUTPATIENT)
Dept: HOME HEALTH SERVICES | Facility: HOSPITAL | Age: 71
End: 2021-09-08
Payer: MEDICARE

## 2021-09-08 ENCOUNTER — OUTPATIENT CASE MANAGEMENT (OUTPATIENT)
Dept: ADMINISTRATIVE | Facility: OTHER | Age: 71
End: 2021-09-08

## 2021-09-09 ENCOUNTER — LAB VISIT (OUTPATIENT)
Dept: LAB | Facility: HOSPITAL | Age: 71
End: 2021-09-09
Payer: MEDICARE

## 2021-09-09 ENCOUNTER — INFUSION (OUTPATIENT)
Dept: INFUSION THERAPY | Facility: HOSPITAL | Age: 71
End: 2021-09-09
Payer: MEDICARE

## 2021-09-09 VITALS
HEIGHT: 63 IN | DIASTOLIC BLOOD PRESSURE: 73 MMHG | RESPIRATION RATE: 17 BRPM | SYSTOLIC BLOOD PRESSURE: 140 MMHG | TEMPERATURE: 99 F | BODY MASS INDEX: 31.1 KG/M2 | WEIGHT: 175.5 LBS | HEART RATE: 72 BPM

## 2021-09-09 DIAGNOSIS — C90.02 MULTIPLE MYELOMA IN RELAPSE: ICD-10-CM

## 2021-09-09 DIAGNOSIS — C90.01 MULTIPLE MYELOMA IN REMISSION: Primary | ICD-10-CM

## 2021-09-09 DIAGNOSIS — C90.01 MULTIPLE MYELOMA IN REMISSION: ICD-10-CM

## 2021-09-09 LAB
ALBUMIN SERPL BCP-MCNC: 3.7 G/DL (ref 3.5–5.2)
ALP SERPL-CCNC: 64 U/L (ref 55–135)
ALT SERPL W/O P-5'-P-CCNC: 11 U/L (ref 10–44)
ANION GAP SERPL CALC-SCNC: 11 MMOL/L (ref 8–16)
AST SERPL-CCNC: 14 U/L (ref 10–40)
BASOPHILS # BLD AUTO: 0.02 K/UL (ref 0–0.2)
BASOPHILS NFR BLD: 0.6 % (ref 0–1.9)
BILIRUB SERPL-MCNC: 0.5 MG/DL (ref 0.1–1)
BUN SERPL-MCNC: 9 MG/DL (ref 8–23)
CALCIUM SERPL-MCNC: 8.9 MG/DL (ref 8.7–10.5)
CHLORIDE SERPL-SCNC: 107 MMOL/L (ref 95–110)
CO2 SERPL-SCNC: 25 MMOL/L (ref 23–29)
CREAT SERPL-MCNC: 1 MG/DL (ref 0.5–1.4)
DIFFERENTIAL METHOD: ABNORMAL
EOSINOPHIL # BLD AUTO: 0.1 K/UL (ref 0–0.5)
EOSINOPHIL NFR BLD: 3.6 % (ref 0–8)
ERYTHROCYTE [DISTWIDTH] IN BLOOD BY AUTOMATED COUNT: 17.7 % (ref 11.5–14.5)
EST. GFR  (AFRICAN AMERICAN): >60 ML/MIN/1.73 M^2
EST. GFR  (NON AFRICAN AMERICAN): 56.4 ML/MIN/1.73 M^2
GLUCOSE SERPL-MCNC: 139 MG/DL (ref 70–110)
HCT VFR BLD AUTO: 33.7 % (ref 37–48.5)
HGB BLD-MCNC: 10.4 G/DL (ref 12–16)
IMM GRANULOCYTES # BLD AUTO: 0.01 K/UL (ref 0–0.04)
IMM GRANULOCYTES NFR BLD AUTO: 0.3 % (ref 0–0.5)
LYMPHOCYTES # BLD AUTO: 0.6 K/UL (ref 1–4.8)
LYMPHOCYTES NFR BLD: 18.8 % (ref 18–48)
MCH RBC QN AUTO: 25.2 PG (ref 27–31)
MCHC RBC AUTO-ENTMCNC: 30.9 G/DL (ref 32–36)
MCV RBC AUTO: 82 FL (ref 82–98)
MONOCYTES # BLD AUTO: 0.4 K/UL (ref 0.3–1)
MONOCYTES NFR BLD: 12.3 % (ref 4–15)
NEUTROPHILS # BLD AUTO: 2 K/UL (ref 1.8–7.7)
NEUTROPHILS NFR BLD: 64.4 % (ref 38–73)
NRBC BLD-RTO: 0 /100 WBC
PLATELET # BLD AUTO: 206 K/UL (ref 150–450)
PMV BLD AUTO: 11.6 FL (ref 9.2–12.9)
POTASSIUM SERPL-SCNC: 3.8 MMOL/L (ref 3.5–5.1)
PROT SERPL-MCNC: 6.9 G/DL (ref 6–8.4)
RBC # BLD AUTO: 4.13 M/UL (ref 4–5.4)
SODIUM SERPL-SCNC: 143 MMOL/L (ref 136–145)
WBC # BLD AUTO: 3.09 K/UL (ref 3.9–12.7)

## 2021-09-09 PROCEDURE — 36415 COLL VENOUS BLD VENIPUNCTURE: CPT | Performed by: INTERNAL MEDICINE

## 2021-09-09 PROCEDURE — 96401 CHEMO ANTI-NEOPL SQ/IM: CPT

## 2021-09-09 PROCEDURE — 63600175 PHARM REV CODE 636 W HCPCS: Mod: JG | Performed by: INTERNAL MEDICINE

## 2021-09-09 PROCEDURE — 80053 COMPREHEN METABOLIC PANEL: CPT | Performed by: INTERNAL MEDICINE

## 2021-09-09 PROCEDURE — 25000003 PHARM REV CODE 250: Performed by: INTERNAL MEDICINE

## 2021-09-09 PROCEDURE — 85025 COMPLETE CBC W/AUTO DIFF WBC: CPT | Performed by: INTERNAL MEDICINE

## 2021-09-09 RX ORDER — BORTEZOMIB 3.5 MG/1
1.3 INJECTION, POWDER, LYOPHILIZED, FOR SOLUTION INTRAVENOUS; SUBCUTANEOUS
Status: CANCELLED | OUTPATIENT
Start: 2021-09-09

## 2021-09-09 RX ORDER — DIPHENHYDRAMINE HCL 25 MG
25 CAPSULE ORAL
Status: CANCELLED | OUTPATIENT
Start: 2021-09-16

## 2021-09-09 RX ORDER — ACETAMINOPHEN 325 MG/1
650 TABLET ORAL
Status: CANCELLED | OUTPATIENT
Start: 2021-09-09

## 2021-09-09 RX ORDER — DIPHENHYDRAMINE HYDROCHLORIDE 50 MG/ML
50 INJECTION INTRAMUSCULAR; INTRAVENOUS ONCE AS NEEDED
Status: CANCELLED | OUTPATIENT
Start: 2021-09-16

## 2021-09-09 RX ORDER — DEXAMETHASONE 4 MG/1
20 TABLET ORAL
Status: CANCELLED | OUTPATIENT
Start: 2021-09-09

## 2021-09-09 RX ORDER — EPINEPHRINE 0.3 MG/.3ML
0.3 INJECTION SUBCUTANEOUS ONCE AS NEEDED
Status: CANCELLED | OUTPATIENT
Start: 2021-09-09

## 2021-09-09 RX ORDER — DIPHENHYDRAMINE HYDROCHLORIDE 50 MG/ML
50 INJECTION INTRAMUSCULAR; INTRAVENOUS ONCE AS NEEDED
Status: CANCELLED | OUTPATIENT
Start: 2021-09-09

## 2021-09-09 RX ORDER — DIPHENHYDRAMINE HCL 25 MG
25 CAPSULE ORAL
Status: CANCELLED | OUTPATIENT
Start: 2021-09-09

## 2021-09-09 RX ORDER — DEXAMETHASONE 4 MG/1
20 TABLET ORAL
Status: COMPLETED | OUTPATIENT
Start: 2021-09-09 | End: 2021-09-09

## 2021-09-09 RX ORDER — BORTEZOMIB 3.5 MG/1
1.3 INJECTION, POWDER, LYOPHILIZED, FOR SOLUTION INTRAVENOUS; SUBCUTANEOUS
Status: CANCELLED | OUTPATIENT
Start: 2021-09-16

## 2021-09-09 RX ORDER — ACETAMINOPHEN 325 MG/1
650 TABLET ORAL
Status: COMPLETED | OUTPATIENT
Start: 2021-09-09 | End: 2021-09-09

## 2021-09-09 RX ORDER — EPINEPHRINE 0.3 MG/.3ML
0.3 INJECTION SUBCUTANEOUS ONCE AS NEEDED
Status: DISCONTINUED | OUTPATIENT
Start: 2021-09-09 | End: 2021-09-09 | Stop reason: HOSPADM

## 2021-09-09 RX ORDER — DEXAMETHASONE 4 MG/1
20 TABLET ORAL
Status: CANCELLED | OUTPATIENT
Start: 2021-09-16

## 2021-09-09 RX ORDER — DIPHENHYDRAMINE HYDROCHLORIDE 50 MG/ML
50 INJECTION INTRAMUSCULAR; INTRAVENOUS ONCE AS NEEDED
Status: DISCONTINUED | OUTPATIENT
Start: 2021-09-09 | End: 2021-09-09 | Stop reason: HOSPADM

## 2021-09-09 RX ORDER — ACETAMINOPHEN 325 MG/1
650 TABLET ORAL
Status: CANCELLED | OUTPATIENT
Start: 2021-09-16

## 2021-09-09 RX ORDER — DIPHENHYDRAMINE HCL 25 MG
25 CAPSULE ORAL
Status: COMPLETED | OUTPATIENT
Start: 2021-09-09 | End: 2021-09-09

## 2021-09-09 RX ORDER — BORTEZOMIB 3.5 MG/1
1.3 INJECTION, POWDER, LYOPHILIZED, FOR SOLUTION INTRAVENOUS; SUBCUTANEOUS
Status: COMPLETED | OUTPATIENT
Start: 2021-09-09 | End: 2021-09-09

## 2021-09-09 RX ORDER — EPINEPHRINE 0.3 MG/.3ML
0.3 INJECTION SUBCUTANEOUS ONCE AS NEEDED
Status: CANCELLED | OUTPATIENT
Start: 2021-09-16

## 2021-09-09 RX ADMIN — DIPHENHYDRAMINE HYDROCHLORIDE 25 MG: 25 CAPSULE ORAL at 09:09

## 2021-09-09 RX ADMIN — DARATUMUMAB AND HYALURONIDASE-FIHJ (HUMAN RECOMBINANT) 1800 MG: 1800; 30000 INJECTION SUBCUTANEOUS at 10:09

## 2021-09-09 RX ADMIN — BORTEZOMIB 2.5 MG: 3.5 INJECTION, POWDER, LYOPHILIZED, FOR SOLUTION INTRAVENOUS; SUBCUTANEOUS at 10:09

## 2021-09-09 RX ADMIN — ACETAMINOPHEN 650 MG: 325 TABLET ORAL at 09:09

## 2021-09-09 RX ADMIN — DEXAMETHASONE 20 MG: 4 TABLET ORAL at 09:09

## 2021-09-16 ENCOUNTER — OFFICE VISIT (OUTPATIENT)
Dept: HEMATOLOGY/ONCOLOGY | Facility: CLINIC | Age: 71
End: 2021-09-16
Payer: MEDICARE

## 2021-09-16 ENCOUNTER — LAB VISIT (OUTPATIENT)
Dept: LAB | Facility: HOSPITAL | Age: 71
End: 2021-09-16
Payer: MEDICARE

## 2021-09-16 ENCOUNTER — INFUSION (OUTPATIENT)
Dept: INFUSION THERAPY | Facility: HOSPITAL | Age: 71
End: 2021-09-16
Payer: MEDICARE

## 2021-09-16 VITALS
DIASTOLIC BLOOD PRESSURE: 72 MMHG | TEMPERATURE: 98 F | RESPIRATION RATE: 16 BRPM | HEART RATE: 72 BPM | OXYGEN SATURATION: 99 % | WEIGHT: 176.13 LBS | BODY MASS INDEX: 31.21 KG/M2 | HEIGHT: 63 IN | SYSTOLIC BLOOD PRESSURE: 145 MMHG

## 2021-09-16 VITALS
HEART RATE: 74 BPM | OXYGEN SATURATION: 97 % | DIASTOLIC BLOOD PRESSURE: 87 MMHG | RESPIRATION RATE: 16 BRPM | HEIGHT: 63 IN | TEMPERATURE: 99 F | BODY MASS INDEX: 31.21 KG/M2 | WEIGHT: 176.13 LBS | SYSTOLIC BLOOD PRESSURE: 146 MMHG

## 2021-09-16 DIAGNOSIS — C90.02 MULTIPLE MYELOMA IN RELAPSE: ICD-10-CM

## 2021-09-16 DIAGNOSIS — T50.905A DRUG-INDUCED CYTOPENIA: ICD-10-CM

## 2021-09-16 DIAGNOSIS — I10 ESSENTIAL HYPERTENSION: ICD-10-CM

## 2021-09-16 DIAGNOSIS — F32.A ANXIETY AND DEPRESSION: ICD-10-CM

## 2021-09-16 DIAGNOSIS — C90.01 MULTIPLE MYELOMA IN REMISSION: Primary | ICD-10-CM

## 2021-09-16 DIAGNOSIS — C90.00 MULTIPLE MYELOMA, REMISSION STATUS UNSPECIFIED: ICD-10-CM

## 2021-09-16 DIAGNOSIS — C90.01 MULTIPLE MYELOMA IN REMISSION: ICD-10-CM

## 2021-09-16 DIAGNOSIS — E11.9 TYPE 2 DIABETES MELLITUS WITHOUT COMPLICATION, WITHOUT LONG-TERM CURRENT USE OF INSULIN: ICD-10-CM

## 2021-09-16 DIAGNOSIS — Z94.84 HISTORY OF AUTO STEM CELL TRANSPLANT: ICD-10-CM

## 2021-09-16 DIAGNOSIS — C80.1 NEUROPATHY ASSOCIATED WITH CANCER: ICD-10-CM

## 2021-09-16 DIAGNOSIS — I69.30 HISTORY OF CVA WITH RESIDUAL DEFICIT: ICD-10-CM

## 2021-09-16 DIAGNOSIS — J18.9 COMMUNITY ACQUIRED PNEUMONIA OF RIGHT LOWER LOBE OF LUNG: ICD-10-CM

## 2021-09-16 DIAGNOSIS — C90.00 MULTIPLE MYELOMA, REMISSION STATUS UNSPECIFIED: Primary | ICD-10-CM

## 2021-09-16 DIAGNOSIS — G63 NEUROPATHY ASSOCIATED WITH CANCER: ICD-10-CM

## 2021-09-16 DIAGNOSIS — F41.9 ANXIETY AND DEPRESSION: ICD-10-CM

## 2021-09-16 DIAGNOSIS — R06.00 DYSPNEA, UNSPECIFIED TYPE: ICD-10-CM

## 2021-09-16 DIAGNOSIS — D75.9 DRUG-INDUCED CYTOPENIA: ICD-10-CM

## 2021-09-16 LAB
ALBUMIN SERPL BCP-MCNC: 3.7 G/DL (ref 3.5–5.2)
ALP SERPL-CCNC: 78 U/L (ref 55–135)
ALT SERPL W/O P-5'-P-CCNC: 11 U/L (ref 10–44)
ANION GAP SERPL CALC-SCNC: 12 MMOL/L (ref 8–16)
AST SERPL-CCNC: 12 U/L (ref 10–40)
BASOPHILS # BLD AUTO: 0.01 K/UL (ref 0–0.2)
BASOPHILS NFR BLD: 0.3 % (ref 0–1.9)
BILIRUB SERPL-MCNC: 0.4 MG/DL (ref 0.1–1)
BUN SERPL-MCNC: 11 MG/DL (ref 8–23)
CALCIUM SERPL-MCNC: 8.4 MG/DL (ref 8.7–10.5)
CHLORIDE SERPL-SCNC: 107 MMOL/L (ref 95–110)
CO2 SERPL-SCNC: 25 MMOL/L (ref 23–29)
CREAT SERPL-MCNC: 0.8 MG/DL (ref 0.5–1.4)
DIFFERENTIAL METHOD: ABNORMAL
EOSINOPHIL # BLD AUTO: 0.1 K/UL (ref 0–0.5)
EOSINOPHIL NFR BLD: 3.2 % (ref 0–8)
ERYTHROCYTE [DISTWIDTH] IN BLOOD BY AUTOMATED COUNT: 17.8 % (ref 11.5–14.5)
EST. GFR  (AFRICAN AMERICAN): >60 ML/MIN/1.73 M^2
EST. GFR  (NON AFRICAN AMERICAN): >60 ML/MIN/1.73 M^2
GLUCOSE SERPL-MCNC: 170 MG/DL (ref 70–110)
HCT VFR BLD AUTO: 34.3 % (ref 37–48.5)
HGB BLD-MCNC: 10.7 G/DL (ref 12–16)
IGA SERPL-MCNC: 16 MG/DL (ref 40–350)
IGG SERPL-MCNC: 795 MG/DL (ref 650–1600)
IGM SERPL-MCNC: 26 MG/DL (ref 50–300)
IMM GRANULOCYTES # BLD AUTO: 0.01 K/UL (ref 0–0.04)
IMM GRANULOCYTES NFR BLD AUTO: 0.3 % (ref 0–0.5)
LYMPHOCYTES # BLD AUTO: 0.7 K/UL (ref 1–4.8)
LYMPHOCYTES NFR BLD: 21 % (ref 18–48)
MCH RBC QN AUTO: 25.5 PG (ref 27–31)
MCHC RBC AUTO-ENTMCNC: 31.2 G/DL (ref 32–36)
MCV RBC AUTO: 82 FL (ref 82–98)
MONOCYTES # BLD AUTO: 0.4 K/UL (ref 0.3–1)
MONOCYTES NFR BLD: 12 % (ref 4–15)
NEUTROPHILS # BLD AUTO: 2.2 K/UL (ref 1.8–7.7)
NEUTROPHILS NFR BLD: 63.2 % (ref 38–73)
NRBC BLD-RTO: 0 /100 WBC
PLATELET # BLD AUTO: 124 K/UL (ref 150–450)
PMV BLD AUTO: ABNORMAL FL (ref 9.2–12.9)
POTASSIUM SERPL-SCNC: 3.8 MMOL/L (ref 3.5–5.1)
PROT SERPL-MCNC: 6.9 G/DL (ref 6–8.4)
RBC # BLD AUTO: 4.19 M/UL (ref 4–5.4)
SODIUM SERPL-SCNC: 144 MMOL/L (ref 136–145)
WBC # BLD AUTO: 3.43 K/UL (ref 3.9–12.7)

## 2021-09-16 PROCEDURE — 3008F PR BODY MASS INDEX (BMI) DOCUMENTED: ICD-10-PCS | Mod: HCNC,BMT,CPTII,S$GLB | Performed by: NURSE PRACTITIONER

## 2021-09-16 PROCEDURE — 3077F PR MOST RECENT SYSTOLIC BLOOD PRESSURE >= 140 MM HG: ICD-10-PCS | Mod: HCNC,BMT,CPTII,S$GLB | Performed by: NURSE PRACTITIONER

## 2021-09-16 PROCEDURE — 1160F RVW MEDS BY RX/DR IN RCRD: CPT | Mod: HCNC,BMT,CPTII,S$GLB | Performed by: NURSE PRACTITIONER

## 2021-09-16 PROCEDURE — 3079F PR MOST RECENT DIASTOLIC BLOOD PRESSURE 80-89 MM HG: ICD-10-PCS | Mod: HCNC,BMT,CPTII,S$GLB | Performed by: NURSE PRACTITIONER

## 2021-09-16 PROCEDURE — 3079F DIAST BP 80-89 MM HG: CPT | Mod: HCNC,BMT,CPTII,S$GLB | Performed by: NURSE PRACTITIONER

## 2021-09-16 PROCEDURE — 85025 COMPLETE CBC W/AUTO DIFF WBC: CPT | Mod: HCNC | Performed by: INTERNAL MEDICINE

## 2021-09-16 PROCEDURE — 1101F PR PT FALLS ASSESS DOC 0-1 FALLS W/OUT INJ PAST YR: ICD-10-PCS | Mod: HCNC,BMT,CPTII,S$GLB | Performed by: NURSE PRACTITIONER

## 2021-09-16 PROCEDURE — 3288F PR FALLS RISK ASSESSMENT DOCUMENTED: ICD-10-PCS | Mod: HCNC,BMT,CPTII,S$GLB | Performed by: NURSE PRACTITIONER

## 2021-09-16 PROCEDURE — 84165 PATHOLOGIST INTERPRETATION SPE: ICD-10-PCS | Mod: 26,HCNC,BMT, | Performed by: PATHOLOGY

## 2021-09-16 PROCEDURE — 96372 THER/PROPH/DIAG INJ SC/IM: CPT | Mod: HCNC

## 2021-09-16 PROCEDURE — 96401 CHEMO ANTI-NEOPL SQ/IM: CPT | Mod: HCNC

## 2021-09-16 PROCEDURE — 3288F FALL RISK ASSESSMENT DOCD: CPT | Mod: HCNC,BMT,CPTII,S$GLB | Performed by: NURSE PRACTITIONER

## 2021-09-16 PROCEDURE — 99215 OFFICE O/P EST HI 40 MIN: CPT | Mod: HCNC,BMT,S$GLB, | Performed by: NURSE PRACTITIONER

## 2021-09-16 PROCEDURE — 82784 ASSAY IGA/IGD/IGG/IGM EACH: CPT | Mod: 59,HCNC | Performed by: INTERNAL MEDICINE

## 2021-09-16 PROCEDURE — 1159F MED LIST DOCD IN RCRD: CPT | Mod: HCNC,BMT,CPTII,S$GLB | Performed by: NURSE PRACTITIONER

## 2021-09-16 PROCEDURE — 99999 PR PBB SHADOW E&M-EST. PATIENT-LVL V: CPT | Mod: PBBFAC,HCNC,BMT, | Performed by: NURSE PRACTITIONER

## 2021-09-16 PROCEDURE — 25000003 PHARM REV CODE 250: Mod: HCNC | Performed by: INTERNAL MEDICINE

## 2021-09-16 PROCEDURE — 3072F PR LOW RISK FOR RETINOPATHY: ICD-10-PCS | Mod: HCNC,BMT,CPTII,S$GLB | Performed by: NURSE PRACTITIONER

## 2021-09-16 PROCEDURE — 83520 IMMUNOASSAY QUANT NOS NONAB: CPT | Mod: 59,HCNC | Performed by: INTERNAL MEDICINE

## 2021-09-16 PROCEDURE — 84165 PROTEIN E-PHORESIS SERUM: CPT | Mod: 26,HCNC,BMT, | Performed by: PATHOLOGY

## 2021-09-16 PROCEDURE — 3077F SYST BP >= 140 MM HG: CPT | Mod: HCNC,BMT,CPTII,S$GLB | Performed by: NURSE PRACTITIONER

## 2021-09-16 PROCEDURE — 80053 COMPREHEN METABOLIC PANEL: CPT | Mod: HCNC | Performed by: INTERNAL MEDICINE

## 2021-09-16 PROCEDURE — 63600175 PHARM REV CODE 636 W HCPCS: Mod: JG,HCNC | Performed by: INTERNAL MEDICINE

## 2021-09-16 PROCEDURE — 99215 PR OFFICE/OUTPT VISIT, EST, LEVL V, 40-54 MIN: ICD-10-PCS | Mod: HCNC,BMT,S$GLB, | Performed by: NURSE PRACTITIONER

## 2021-09-16 PROCEDURE — 84165 PROTEIN E-PHORESIS SERUM: CPT | Mod: HCNC | Performed by: NURSE PRACTITIONER

## 2021-09-16 PROCEDURE — 1126F PR PAIN SEVERITY QUANTIFIED, NO PAIN PRESENT: ICD-10-PCS | Mod: HCNC,BMT,CPTII,S$GLB | Performed by: NURSE PRACTITIONER

## 2021-09-16 PROCEDURE — 3008F BODY MASS INDEX DOCD: CPT | Mod: HCNC,BMT,CPTII,S$GLB | Performed by: NURSE PRACTITIONER

## 2021-09-16 PROCEDURE — 1160F PR REVIEW ALL MEDS BY PRESCRIBER/CLIN PHARMACIST DOCUMENTED: ICD-10-PCS | Mod: HCNC,BMT,CPTII,S$GLB | Performed by: NURSE PRACTITIONER

## 2021-09-16 PROCEDURE — 86334 IMMUNOFIX E-PHORESIS SERUM: CPT | Mod: 26,HCNC,BMT, | Performed by: PATHOLOGY

## 2021-09-16 PROCEDURE — 3072F LOW RISK FOR RETINOPATHY: CPT | Mod: HCNC,BMT,CPTII,S$GLB | Performed by: NURSE PRACTITIONER

## 2021-09-16 PROCEDURE — 86334 PATHOLOGIST INTERPRETATION IFE: ICD-10-PCS | Mod: 26,HCNC,BMT, | Performed by: PATHOLOGY

## 2021-09-16 PROCEDURE — 1126F AMNT PAIN NOTED NONE PRSNT: CPT | Mod: HCNC,BMT,CPTII,S$GLB | Performed by: NURSE PRACTITIONER

## 2021-09-16 PROCEDURE — 36415 COLL VENOUS BLD VENIPUNCTURE: CPT | Mod: HCNC | Performed by: INTERNAL MEDICINE

## 2021-09-16 PROCEDURE — 3051F HG A1C>EQUAL 7.0%<8.0%: CPT | Mod: HCNC,BMT,CPTII,S$GLB | Performed by: NURSE PRACTITIONER

## 2021-09-16 PROCEDURE — 99999 PR PBB SHADOW E&M-EST. PATIENT-LVL V: ICD-10-PCS | Mod: PBBFAC,HCNC,BMT, | Performed by: NURSE PRACTITIONER

## 2021-09-16 PROCEDURE — 1159F PR MEDICATION LIST DOCUMENTED IN MEDICAL RECORD: ICD-10-PCS | Mod: HCNC,BMT,CPTII,S$GLB | Performed by: NURSE PRACTITIONER

## 2021-09-16 PROCEDURE — 3051F PR MOST RECENT HEMOGLOBIN A1C LEVEL 7.0 - < 8.0%: ICD-10-PCS | Mod: HCNC,BMT,CPTII,S$GLB | Performed by: NURSE PRACTITIONER

## 2021-09-16 PROCEDURE — 1101F PT FALLS ASSESS-DOCD LE1/YR: CPT | Mod: HCNC,BMT,CPTII,S$GLB | Performed by: NURSE PRACTITIONER

## 2021-09-16 PROCEDURE — 86334 IMMUNOFIX E-PHORESIS SERUM: CPT | Mod: HCNC | Performed by: INTERNAL MEDICINE

## 2021-09-16 RX ORDER — DEXAMETHASONE 4 MG/1
20 TABLET ORAL
Status: COMPLETED | OUTPATIENT
Start: 2021-09-16 | End: 2021-09-16

## 2021-09-16 RX ORDER — DIPHENHYDRAMINE HCL 25 MG
25 CAPSULE ORAL
Status: COMPLETED | OUTPATIENT
Start: 2021-09-16 | End: 2021-09-16

## 2021-09-16 RX ORDER — BORTEZOMIB 3.5 MG/1
1.3 INJECTION, POWDER, LYOPHILIZED, FOR SOLUTION INTRAVENOUS; SUBCUTANEOUS
Status: COMPLETED | OUTPATIENT
Start: 2021-09-16 | End: 2021-09-16

## 2021-09-16 RX ORDER — ACETAMINOPHEN 325 MG/1
650 TABLET ORAL
Status: COMPLETED | OUTPATIENT
Start: 2021-09-16 | End: 2021-09-16

## 2021-09-16 RX ORDER — EPINEPHRINE 0.3 MG/.3ML
0.3 INJECTION SUBCUTANEOUS ONCE AS NEEDED
Status: DISCONTINUED | OUTPATIENT
Start: 2021-09-16 | End: 2021-09-16 | Stop reason: HOSPADM

## 2021-09-16 RX ORDER — DIPHENHYDRAMINE HYDROCHLORIDE 50 MG/ML
50 INJECTION INTRAMUSCULAR; INTRAVENOUS ONCE AS NEEDED
Status: DISCONTINUED | OUTPATIENT
Start: 2021-09-16 | End: 2021-09-16 | Stop reason: HOSPADM

## 2021-09-16 RX ADMIN — ACETAMINOPHEN 650 MG: 325 TABLET ORAL at 09:09

## 2021-09-16 RX ADMIN — DARATUMUMAB AND HYALURONIDASE-FIHJ (HUMAN RECOMBINANT) 1800 MG: 1800; 30000 INJECTION SUBCUTANEOUS at 10:09

## 2021-09-16 RX ADMIN — DEXAMETHASONE 20 MG: 4 TABLET ORAL at 09:09

## 2021-09-16 RX ADMIN — DIPHENHYDRAMINE HYDROCHLORIDE 25 MG: 25 CAPSULE ORAL at 09:09

## 2021-09-16 RX ADMIN — BORTEZOMIB 2.5 MG: 3.5 INJECTION, POWDER, LYOPHILIZED, FOR SOLUTION INTRAVENOUS; SUBCUTANEOUS at 10:09

## 2021-09-17 LAB
ALBUMIN SERPL ELPH-MCNC: 3.87 G/DL (ref 3.35–5.55)
ALPHA1 GLOB SERPL ELPH-MCNC: 0.31 G/DL (ref 0.17–0.41)
ALPHA2 GLOB SERPL ELPH-MCNC: 0.92 G/DL (ref 0.43–0.99)
B-GLOBULIN SERPL ELPH-MCNC: 0.71 G/DL (ref 0.5–1.1)
GAMMA GLOB SERPL ELPH-MCNC: 0.7 G/DL (ref 0.67–1.58)
INTERPRETATION SERPL IFE-IMP: NORMAL
KAPPA LC SER QL IA: 2.33 MG/DL (ref 0.33–1.94)
KAPPA LC/LAMBDA SER IA: 6.13 (ref 0.26–1.65)
LAMBDA LC SER QL IA: 0.38 MG/DL (ref 0.57–2.63)
PATHOLOGIST INTERPRETATION IFE: NORMAL
PATHOLOGIST INTERPRETATION SPE: NORMAL
PROT SERPL-MCNC: 6.5 G/DL (ref 6–8.4)

## 2021-09-23 ENCOUNTER — INFUSION (OUTPATIENT)
Dept: INFUSION THERAPY | Facility: HOSPITAL | Age: 71
End: 2021-09-23
Attending: INTERNAL MEDICINE
Payer: MEDICARE

## 2021-09-23 VITALS
OXYGEN SATURATION: 98 % | DIASTOLIC BLOOD PRESSURE: 70 MMHG | SYSTOLIC BLOOD PRESSURE: 128 MMHG | RESPIRATION RATE: 16 BRPM | HEART RATE: 68 BPM | BODY MASS INDEX: 31.21 KG/M2 | HEIGHT: 63 IN | TEMPERATURE: 98 F | WEIGHT: 176.13 LBS

## 2021-09-23 DIAGNOSIS — C90.02 MULTIPLE MYELOMA IN RELAPSE: ICD-10-CM

## 2021-09-23 DIAGNOSIS — C90.01 MULTIPLE MYELOMA IN REMISSION: Primary | ICD-10-CM

## 2021-09-23 DIAGNOSIS — C90.00 MULTIPLE MYELOMA, REMISSION STATUS UNSPECIFIED: Primary | ICD-10-CM

## 2021-09-23 PROCEDURE — 63600175 PHARM REV CODE 636 W HCPCS: Mod: HCNC | Performed by: INTERNAL MEDICINE

## 2021-09-23 PROCEDURE — 96411 CHEMO IV PUSH ADDL DRUG: CPT | Mod: HCNC

## 2021-09-23 PROCEDURE — 96409 CHEMO IV PUSH SNGL DRUG: CPT | Mod: HCNC

## 2021-09-23 PROCEDURE — 25000003 PHARM REV CODE 250: Mod: HCNC | Performed by: INTERNAL MEDICINE

## 2021-09-23 RX ORDER — DEXAMETHASONE 4 MG/1
20 TABLET ORAL
Status: CANCELLED | OUTPATIENT
Start: 2021-10-14

## 2021-09-23 RX ORDER — BORTEZOMIB 3.5 MG/1
1.3 INJECTION, POWDER, LYOPHILIZED, FOR SOLUTION INTRAVENOUS; SUBCUTANEOUS
Status: COMPLETED | OUTPATIENT
Start: 2021-09-23 | End: 2021-09-23

## 2021-09-23 RX ORDER — EPINEPHRINE 0.3 MG/.3ML
0.3 INJECTION SUBCUTANEOUS ONCE AS NEEDED
Status: DISCONTINUED | OUTPATIENT
Start: 2021-09-23 | End: 2021-09-23 | Stop reason: HOSPADM

## 2021-09-23 RX ORDER — EPINEPHRINE 0.3 MG/.3ML
0.3 INJECTION SUBCUTANEOUS ONCE AS NEEDED
Status: CANCELLED | OUTPATIENT
Start: 2021-10-14

## 2021-09-23 RX ORDER — DIPHENHYDRAMINE HCL 25 MG
25 CAPSULE ORAL
Status: COMPLETED | OUTPATIENT
Start: 2021-09-23 | End: 2021-09-23

## 2021-09-23 RX ORDER — DEXAMETHASONE 4 MG/1
20 TABLET ORAL
Status: CANCELLED | OUTPATIENT
Start: 2021-10-07

## 2021-09-23 RX ORDER — BORTEZOMIB 3.5 MG/1
1.3 INJECTION, POWDER, LYOPHILIZED, FOR SOLUTION INTRAVENOUS; SUBCUTANEOUS
Status: CANCELLED | OUTPATIENT
Start: 2021-10-07

## 2021-09-23 RX ORDER — ACETAMINOPHEN 325 MG/1
650 TABLET ORAL
Status: CANCELLED | OUTPATIENT
Start: 2021-09-23

## 2021-09-23 RX ORDER — DEXAMETHASONE 4 MG/1
20 TABLET ORAL
Status: COMPLETED | OUTPATIENT
Start: 2021-09-23 | End: 2021-09-23

## 2021-09-23 RX ORDER — DIPHENHYDRAMINE HCL 25 MG
25 CAPSULE ORAL
Status: CANCELLED | OUTPATIENT
Start: 2021-09-23

## 2021-09-23 RX ORDER — DEXAMETHASONE 4 MG/1
20 TABLET ORAL
Status: CANCELLED | OUTPATIENT
Start: 2021-09-23

## 2021-09-23 RX ORDER — DIPHENHYDRAMINE HYDROCHLORIDE 50 MG/ML
50 INJECTION INTRAMUSCULAR; INTRAVENOUS ONCE AS NEEDED
Status: CANCELLED | OUTPATIENT
Start: 2021-09-23

## 2021-09-23 RX ORDER — DIPHENHYDRAMINE HCL 25 MG
25 CAPSULE ORAL
Status: CANCELLED | OUTPATIENT
Start: 2021-10-07

## 2021-09-23 RX ORDER — BORTEZOMIB 3.5 MG/1
1.3 INJECTION, POWDER, LYOPHILIZED, FOR SOLUTION INTRAVENOUS; SUBCUTANEOUS
Status: CANCELLED | OUTPATIENT
Start: 2021-09-30

## 2021-09-23 RX ORDER — EPINEPHRINE 0.3 MG/.3ML
0.3 INJECTION SUBCUTANEOUS ONCE AS NEEDED
Status: CANCELLED | OUTPATIENT
Start: 2021-09-23

## 2021-09-23 RX ORDER — DIPHENHYDRAMINE HYDROCHLORIDE 50 MG/ML
50 INJECTION INTRAMUSCULAR; INTRAVENOUS ONCE AS NEEDED
Status: DISCONTINUED | OUTPATIENT
Start: 2021-09-23 | End: 2021-09-23 | Stop reason: HOSPADM

## 2021-09-23 RX ORDER — BORTEZOMIB 3.5 MG/1
1.3 INJECTION, POWDER, LYOPHILIZED, FOR SOLUTION INTRAVENOUS; SUBCUTANEOUS
Status: CANCELLED | OUTPATIENT
Start: 2021-09-23

## 2021-09-23 RX ORDER — EPINEPHRINE 0.3 MG/.3ML
0.3 INJECTION SUBCUTANEOUS ONCE AS NEEDED
Status: CANCELLED | OUTPATIENT
Start: 2021-10-07

## 2021-09-23 RX ORDER — ACETAMINOPHEN 325 MG/1
650 TABLET ORAL
Status: COMPLETED | OUTPATIENT
Start: 2021-09-23 | End: 2021-09-23

## 2021-09-23 RX ORDER — BORTEZOMIB 3.5 MG/1
1.3 INJECTION, POWDER, LYOPHILIZED, FOR SOLUTION INTRAVENOUS; SUBCUTANEOUS
Status: CANCELLED | OUTPATIENT
Start: 2021-10-14

## 2021-09-23 RX ORDER — DEXAMETHASONE 4 MG/1
20 TABLET ORAL
Status: CANCELLED | OUTPATIENT
Start: 2021-09-30

## 2021-09-23 RX ORDER — DIPHENHYDRAMINE HYDROCHLORIDE 50 MG/ML
50 INJECTION INTRAMUSCULAR; INTRAVENOUS ONCE AS NEEDED
Status: CANCELLED | OUTPATIENT
Start: 2021-10-07

## 2021-09-23 RX ORDER — EPINEPHRINE 0.3 MG/.3ML
0.3 INJECTION SUBCUTANEOUS ONCE AS NEEDED
Status: CANCELLED | OUTPATIENT
Start: 2021-09-30

## 2021-09-23 RX ORDER — DIPHENHYDRAMINE HYDROCHLORIDE 50 MG/ML
50 INJECTION INTRAMUSCULAR; INTRAVENOUS ONCE AS NEEDED
Status: CANCELLED | OUTPATIENT
Start: 2021-10-14

## 2021-09-23 RX ORDER — DIPHENHYDRAMINE HYDROCHLORIDE 50 MG/ML
50 INJECTION INTRAMUSCULAR; INTRAVENOUS ONCE AS NEEDED
Status: CANCELLED | OUTPATIENT
Start: 2021-09-30

## 2021-09-23 RX ORDER — ACETAMINOPHEN 325 MG/1
650 TABLET ORAL
Status: CANCELLED | OUTPATIENT
Start: 2021-10-07

## 2021-09-23 RX ADMIN — ACETAMINOPHEN 650 MG: 325 TABLET ORAL at 11:09

## 2021-09-23 RX ADMIN — DARATUMUMAB AND HYALURONIDASE-FIHJ (HUMAN RECOMBINANT) 1800 MG: 1800; 30000 INJECTION SUBCUTANEOUS at 12:09

## 2021-09-23 RX ADMIN — DIPHENHYDRAMINE HYDROCHLORIDE 25 MG: 25 CAPSULE ORAL at 11:09

## 2021-09-23 RX ADMIN — DEXAMETHASONE 20 MG: 4 TABLET ORAL at 11:09

## 2021-09-23 RX ADMIN — BORTEZOMIB 2.5 MG: 3.5 INJECTION, POWDER, LYOPHILIZED, FOR SOLUTION INTRAVENOUS; SUBCUTANEOUS at 12:09

## 2021-09-24 ENCOUNTER — SPECIALTY PHARMACY (OUTPATIENT)
Dept: PHARMACY | Facility: CLINIC | Age: 71
End: 2021-09-24

## 2021-09-30 ENCOUNTER — CLINICAL SUPPORT (OUTPATIENT)
Dept: HEMATOLOGY/ONCOLOGY | Facility: CLINIC | Age: 71
End: 2021-09-30
Payer: MEDICARE

## 2021-09-30 ENCOUNTER — INFUSION (OUTPATIENT)
Dept: INFUSION THERAPY | Facility: HOSPITAL | Age: 71
End: 2021-09-30
Payer: MEDICARE

## 2021-09-30 VITALS
HEART RATE: 72 BPM | TEMPERATURE: 98 F | RESPIRATION RATE: 18 BRPM | SYSTOLIC BLOOD PRESSURE: 150 MMHG | DIASTOLIC BLOOD PRESSURE: 63 MMHG

## 2021-09-30 DIAGNOSIS — Z71.3 NUTRITIONAL COUNSELING: ICD-10-CM

## 2021-09-30 DIAGNOSIS — C90.02 MULTIPLE MYELOMA IN RELAPSE: ICD-10-CM

## 2021-09-30 DIAGNOSIS — C90.00 MULTIPLE MYELOMA, REMISSION STATUS UNSPECIFIED: Primary | ICD-10-CM

## 2021-09-30 DIAGNOSIS — C90.01 MULTIPLE MYELOMA IN REMISSION: Primary | ICD-10-CM

## 2021-09-30 DIAGNOSIS — E11.9 TYPE 2 DIABETES MELLITUS WITHOUT COMPLICATION, WITHOUT LONG-TERM CURRENT USE OF INSULIN: ICD-10-CM

## 2021-09-30 PROCEDURE — 97802 PR MED NUTR THER, 1ST, INDIV, EA 15 MIN: ICD-10-PCS | Mod: HCNC,BMT,S$GLB, | Performed by: DIETITIAN, REGISTERED

## 2021-09-30 PROCEDURE — 99999 PR PBB SHADOW E&M-EST. PATIENT-LVL I: ICD-10-PCS | Mod: PBBFAC,HCNC,BMT, | Performed by: DIETITIAN, REGISTERED

## 2021-09-30 PROCEDURE — 63600175 PHARM REV CODE 636 W HCPCS: Mod: HCNC | Performed by: INTERNAL MEDICINE

## 2021-09-30 PROCEDURE — 99999 PR PBB SHADOW E&M-EST. PATIENT-LVL I: CPT | Mod: PBBFAC,HCNC,BMT, | Performed by: DIETITIAN, REGISTERED

## 2021-09-30 PROCEDURE — 96401 CHEMO ANTI-NEOPL SQ/IM: CPT | Mod: HCNC

## 2021-09-30 PROCEDURE — 97802 MEDICAL NUTRITION INDIV IN: CPT | Mod: HCNC,BMT,S$GLB, | Performed by: DIETITIAN, REGISTERED

## 2021-09-30 RX ORDER — DEXAMETHASONE 4 MG/1
20 TABLET ORAL
Status: COMPLETED | OUTPATIENT
Start: 2021-09-30 | End: 2021-09-30

## 2021-09-30 RX ORDER — BORTEZOMIB 3.5 MG/1
1.3 INJECTION, POWDER, LYOPHILIZED, FOR SOLUTION INTRAVENOUS; SUBCUTANEOUS
Status: COMPLETED | OUTPATIENT
Start: 2021-09-30 | End: 2021-09-30

## 2021-09-30 RX ADMIN — DEXAMETHASONE 20 MG: 4 TABLET ORAL at 11:09

## 2021-09-30 RX ADMIN — BORTEZOMIB 2.5 MG: 3.5 INJECTION, POWDER, LYOPHILIZED, FOR SOLUTION INTRAVENOUS; SUBCUTANEOUS at 11:09

## 2021-10-04 ENCOUNTER — PATIENT MESSAGE (OUTPATIENT)
Dept: ADMINISTRATIVE | Facility: HOSPITAL | Age: 71
End: 2021-10-04

## 2021-10-05 PROCEDURE — G0179 MD RECERTIFICATION HHA PT: HCPCS | Mod: ,,, | Performed by: INTERNAL MEDICINE

## 2021-10-05 PROCEDURE — G0179 PR HOME HEALTH MD RECERTIFICATION: ICD-10-PCS | Mod: ,,, | Performed by: INTERNAL MEDICINE

## 2021-10-07 ENCOUNTER — INFUSION (OUTPATIENT)
Dept: INFUSION THERAPY | Facility: HOSPITAL | Age: 71
End: 2021-10-07
Attending: INTERNAL MEDICINE
Payer: MEDICARE

## 2021-10-07 VITALS
DIASTOLIC BLOOD PRESSURE: 70 MMHG | HEART RATE: 80 BPM | SYSTOLIC BLOOD PRESSURE: 108 MMHG | RESPIRATION RATE: 16 BRPM | TEMPERATURE: 98 F

## 2021-10-07 DIAGNOSIS — C90.02 MULTIPLE MYELOMA IN RELAPSE: ICD-10-CM

## 2021-10-07 DIAGNOSIS — C90.01 MULTIPLE MYELOMA IN REMISSION: Primary | ICD-10-CM

## 2021-10-07 PROCEDURE — 25000003 PHARM REV CODE 250: Mod: HCNC | Performed by: INTERNAL MEDICINE

## 2021-10-07 PROCEDURE — 63600175 PHARM REV CODE 636 W HCPCS: Mod: HCNC | Performed by: INTERNAL MEDICINE

## 2021-10-07 PROCEDURE — 96401 CHEMO ANTI-NEOPL SQ/IM: CPT | Mod: HCNC

## 2021-10-07 RX ORDER — DEXAMETHASONE 4 MG/1
20 TABLET ORAL
Status: COMPLETED | OUTPATIENT
Start: 2021-10-07 | End: 2021-10-07

## 2021-10-07 RX ORDER — DIPHENHYDRAMINE HCL 25 MG
25 CAPSULE ORAL
Status: COMPLETED | OUTPATIENT
Start: 2021-10-07 | End: 2021-10-07

## 2021-10-07 RX ORDER — DIPHENHYDRAMINE HYDROCHLORIDE 50 MG/ML
50 INJECTION INTRAMUSCULAR; INTRAVENOUS ONCE AS NEEDED
Status: DISCONTINUED | OUTPATIENT
Start: 2021-10-07 | End: 2021-10-07 | Stop reason: HOSPADM

## 2021-10-07 RX ORDER — EPINEPHRINE 0.3 MG/.3ML
0.3 INJECTION SUBCUTANEOUS ONCE AS NEEDED
Status: DISCONTINUED | OUTPATIENT
Start: 2021-10-07 | End: 2021-10-07 | Stop reason: HOSPADM

## 2021-10-07 RX ORDER — BORTEZOMIB 3.5 MG/1
1.3 INJECTION, POWDER, LYOPHILIZED, FOR SOLUTION INTRAVENOUS; SUBCUTANEOUS
Status: COMPLETED | OUTPATIENT
Start: 2021-10-07 | End: 2021-10-07

## 2021-10-07 RX ORDER — ACETAMINOPHEN 325 MG/1
650 TABLET ORAL
Status: COMPLETED | OUTPATIENT
Start: 2021-10-07 | End: 2021-10-07

## 2021-10-07 RX ADMIN — DIPHENHYDRAMINE HYDROCHLORIDE 25 MG: 25 CAPSULE ORAL at 10:10

## 2021-10-07 RX ADMIN — ACETAMINOPHEN 650 MG: 325 TABLET ORAL at 10:10

## 2021-10-07 RX ADMIN — BORTEZOMIB 2.5 MG: 3.5 INJECTION, POWDER, LYOPHILIZED, FOR SOLUTION INTRAVENOUS; SUBCUTANEOUS at 11:10

## 2021-10-07 RX ADMIN — DARATUMUMAB AND HYALURONIDASE-FIHJ (HUMAN RECOMBINANT) 1800 MG: 1800; 30000 INJECTION SUBCUTANEOUS at 12:10

## 2021-10-07 RX ADMIN — DEXAMETHASONE 20 MG: 4 TABLET ORAL at 10:10

## 2021-10-14 ENCOUNTER — INFUSION (OUTPATIENT)
Dept: INFUSION THERAPY | Facility: HOSPITAL | Age: 71
End: 2021-10-14
Attending: INTERNAL MEDICINE
Payer: MEDICARE

## 2021-10-14 ENCOUNTER — OFFICE VISIT (OUTPATIENT)
Dept: HEMATOLOGY/ONCOLOGY | Facility: CLINIC | Age: 71
End: 2021-10-14
Payer: MEDICARE

## 2021-10-14 VITALS
WEIGHT: 182.63 LBS | RESPIRATION RATE: 16 BRPM | TEMPERATURE: 98 F | DIASTOLIC BLOOD PRESSURE: 83 MMHG | HEIGHT: 63 IN | SYSTOLIC BLOOD PRESSURE: 135 MMHG | HEART RATE: 66 BPM | OXYGEN SATURATION: 96 % | BODY MASS INDEX: 32.36 KG/M2

## 2021-10-14 DIAGNOSIS — C90.01 MULTIPLE MYELOMA IN REMISSION: ICD-10-CM

## 2021-10-14 DIAGNOSIS — C90.02 MULTIPLE MYELOMA IN RELAPSE: ICD-10-CM

## 2021-10-14 DIAGNOSIS — I69.30 HISTORY OF CVA WITH RESIDUAL DEFICIT: Primary | ICD-10-CM

## 2021-10-14 DIAGNOSIS — Z94.81 STATUS POST AUTOLOGOUS BONE MARROW TRANSPLANT: Primary | ICD-10-CM

## 2021-10-14 PROCEDURE — 3008F BODY MASS INDEX DOCD: CPT | Mod: HCNC,BMT,CPTII,S$GLB | Performed by: INTERNAL MEDICINE

## 2021-10-14 PROCEDURE — 1101F PR PT FALLS ASSESS DOC 0-1 FALLS W/OUT INJ PAST YR: ICD-10-PCS | Mod: HCNC,BMT,CPTII,S$GLB | Performed by: INTERNAL MEDICINE

## 2021-10-14 PROCEDURE — 63600175 PHARM REV CODE 636 W HCPCS: Mod: HCNC | Performed by: INTERNAL MEDICINE

## 2021-10-14 PROCEDURE — 99215 PR OFFICE/OUTPT VISIT, EST, LEVL V, 40-54 MIN: ICD-10-PCS | Mod: HCNC,BMT,S$GLB, | Performed by: INTERNAL MEDICINE

## 2021-10-14 PROCEDURE — 1159F PR MEDICATION LIST DOCUMENTED IN MEDICAL RECORD: ICD-10-PCS | Mod: HCNC,BMT,CPTII,S$GLB | Performed by: INTERNAL MEDICINE

## 2021-10-14 PROCEDURE — 25000003 PHARM REV CODE 250: Mod: HCNC | Performed by: INTERNAL MEDICINE

## 2021-10-14 PROCEDURE — 1159F MED LIST DOCD IN RCRD: CPT | Mod: HCNC,BMT,CPTII,S$GLB | Performed by: INTERNAL MEDICINE

## 2021-10-14 PROCEDURE — 3072F LOW RISK FOR RETINOPATHY: CPT | Mod: HCNC,BMT,CPTII,S$GLB | Performed by: INTERNAL MEDICINE

## 2021-10-14 PROCEDURE — 3072F PR LOW RISK FOR RETINOPATHY: ICD-10-PCS | Mod: HCNC,BMT,CPTII,S$GLB | Performed by: INTERNAL MEDICINE

## 2021-10-14 PROCEDURE — 96375 TX/PRO/DX INJ NEW DRUG ADDON: CPT | Mod: HCNC

## 2021-10-14 PROCEDURE — 3288F FALL RISK ASSESSMENT DOCD: CPT | Mod: HCNC,BMT,CPTII,S$GLB | Performed by: INTERNAL MEDICINE

## 2021-10-14 PROCEDURE — 99215 OFFICE O/P EST HI 40 MIN: CPT | Mod: HCNC,BMT,S$GLB, | Performed by: INTERNAL MEDICINE

## 2021-10-14 PROCEDURE — 1126F PR PAIN SEVERITY QUANTIFIED, NO PAIN PRESENT: ICD-10-PCS | Mod: HCNC,BMT,CPTII,S$GLB | Performed by: INTERNAL MEDICINE

## 2021-10-14 PROCEDURE — 3075F SYST BP GE 130 - 139MM HG: CPT | Mod: HCNC,BMT,CPTII,S$GLB | Performed by: INTERNAL MEDICINE

## 2021-10-14 PROCEDURE — 1101F PT FALLS ASSESS-DOCD LE1/YR: CPT | Mod: HCNC,BMT,CPTII,S$GLB | Performed by: INTERNAL MEDICINE

## 2021-10-14 PROCEDURE — 3008F PR BODY MASS INDEX (BMI) DOCUMENTED: ICD-10-PCS | Mod: HCNC,BMT,CPTII,S$GLB | Performed by: INTERNAL MEDICINE

## 2021-10-14 PROCEDURE — 3288F PR FALLS RISK ASSESSMENT DOCUMENTED: ICD-10-PCS | Mod: HCNC,BMT,CPTII,S$GLB | Performed by: INTERNAL MEDICINE

## 2021-10-14 PROCEDURE — 3075F PR MOST RECENT SYSTOLIC BLOOD PRESS GE 130-139MM HG: ICD-10-PCS | Mod: HCNC,BMT,CPTII,S$GLB | Performed by: INTERNAL MEDICINE

## 2021-10-14 PROCEDURE — 3051F HG A1C>EQUAL 7.0%<8.0%: CPT | Mod: HCNC,BMT,CPTII,S$GLB | Performed by: INTERNAL MEDICINE

## 2021-10-14 PROCEDURE — 99999 PR PBB SHADOW E&M-EST. PATIENT-LVL III: ICD-10-PCS | Mod: PBBFAC,HCNC,BMT, | Performed by: INTERNAL MEDICINE

## 2021-10-14 PROCEDURE — 3079F PR MOST RECENT DIASTOLIC BLOOD PRESSURE 80-89 MM HG: ICD-10-PCS | Mod: HCNC,BMT,CPTII,S$GLB | Performed by: INTERNAL MEDICINE

## 2021-10-14 PROCEDURE — 3079F DIAST BP 80-89 MM HG: CPT | Mod: HCNC,BMT,CPTII,S$GLB | Performed by: INTERNAL MEDICINE

## 2021-10-14 PROCEDURE — 99999 PR PBB SHADOW E&M-EST. PATIENT-LVL III: CPT | Mod: PBBFAC,HCNC,BMT, | Performed by: INTERNAL MEDICINE

## 2021-10-14 PROCEDURE — 96374 THER/PROPH/DIAG INJ IV PUSH: CPT | Mod: HCNC

## 2021-10-14 PROCEDURE — 1126F AMNT PAIN NOTED NONE PRSNT: CPT | Mod: HCNC,BMT,CPTII,S$GLB | Performed by: INTERNAL MEDICINE

## 2021-10-14 PROCEDURE — 3051F PR MOST RECENT HEMOGLOBIN A1C LEVEL 7.0 - < 8.0%: ICD-10-PCS | Mod: HCNC,BMT,CPTII,S$GLB | Performed by: INTERNAL MEDICINE

## 2021-10-14 PROCEDURE — 96401 CHEMO ANTI-NEOPL SQ/IM: CPT | Mod: HCNC

## 2021-10-14 RX ORDER — DIPHENHYDRAMINE HYDROCHLORIDE 50 MG/ML
50 INJECTION INTRAMUSCULAR; INTRAVENOUS ONCE AS NEEDED
Status: DISCONTINUED | OUTPATIENT
Start: 2021-10-14 | End: 2021-10-14 | Stop reason: HOSPADM

## 2021-10-14 RX ORDER — DEXAMETHASONE 4 MG/1
20 TABLET ORAL
Status: COMPLETED | OUTPATIENT
Start: 2021-10-14 | End: 2021-10-14

## 2021-10-14 RX ORDER — BORTEZOMIB 3.5 MG/1
1.3 INJECTION, POWDER, LYOPHILIZED, FOR SOLUTION INTRAVENOUS; SUBCUTANEOUS
Status: COMPLETED | OUTPATIENT
Start: 2021-10-14 | End: 2021-10-14

## 2021-10-14 RX ORDER — HEPARIN 100 UNIT/ML
500 SYRINGE INTRAVENOUS
Status: CANCELLED | OUTPATIENT
Start: 2021-10-14

## 2021-10-14 RX ORDER — SODIUM CHLORIDE 0.9 % (FLUSH) 0.9 %
10 SYRINGE (ML) INJECTION
Status: DISCONTINUED | OUTPATIENT
Start: 2021-10-14 | End: 2021-10-14 | Stop reason: HOSPADM

## 2021-10-14 RX ORDER — SODIUM CHLORIDE 0.9 % (FLUSH) 0.9 %
10 SYRINGE (ML) INJECTION
Status: CANCELLED | OUTPATIENT
Start: 2021-10-14

## 2021-10-14 RX ORDER — ZOLEDRONIC ACID 0.04 MG/ML
4 INJECTION, SOLUTION INTRAVENOUS
Status: CANCELLED | OUTPATIENT
Start: 2021-10-14

## 2021-10-14 RX ORDER — HEPARIN 100 UNIT/ML
500 SYRINGE INTRAVENOUS
Status: DISCONTINUED | OUTPATIENT
Start: 2021-10-14 | End: 2021-10-14 | Stop reason: HOSPADM

## 2021-10-14 RX ORDER — ZOLEDRONIC ACID 0.04 MG/ML
4 INJECTION, SOLUTION INTRAVENOUS
Status: COMPLETED | OUTPATIENT
Start: 2021-10-14 | End: 2021-10-14

## 2021-10-14 RX ORDER — EPINEPHRINE 0.3 MG/.3ML
0.3 INJECTION SUBCUTANEOUS ONCE AS NEEDED
Status: DISCONTINUED | OUTPATIENT
Start: 2021-10-14 | End: 2021-10-14 | Stop reason: HOSPADM

## 2021-10-14 RX ADMIN — ZOLEDRONIC ACID 4 MG: 0.04 INJECTION, SOLUTION INTRAVENOUS at 11:10

## 2021-10-14 RX ADMIN — SODIUM CHLORIDE: 0.9 INJECTION, SOLUTION INTRAVENOUS at 11:10

## 2021-10-14 RX ADMIN — DEXAMETHASONE 20 MG: 4 TABLET ORAL at 11:10

## 2021-10-14 RX ADMIN — BORTEZOMIB 2.5 MG: 3.5 INJECTION, POWDER, LYOPHILIZED, FOR SOLUTION INTRAVENOUS; SUBCUTANEOUS at 12:10

## 2021-10-20 DIAGNOSIS — E11.9 TYPE 2 DIABETES MELLITUS WITHOUT COMPLICATION: ICD-10-CM

## 2021-10-21 ENCOUNTER — INFUSION (OUTPATIENT)
Dept: INFUSION THERAPY | Facility: HOSPITAL | Age: 71
End: 2021-10-21
Payer: MEDICARE

## 2021-10-21 VITALS
DIASTOLIC BLOOD PRESSURE: 60 MMHG | RESPIRATION RATE: 18 BRPM | SYSTOLIC BLOOD PRESSURE: 132 MMHG | HEART RATE: 80 BPM | TEMPERATURE: 98 F

## 2021-10-21 DIAGNOSIS — C90.02 MULTIPLE MYELOMA IN RELAPSE: ICD-10-CM

## 2021-10-21 DIAGNOSIS — C90.01 MULTIPLE MYELOMA IN REMISSION: Primary | ICD-10-CM

## 2021-10-21 PROCEDURE — 63600175 PHARM REV CODE 636 W HCPCS: Mod: TB,HCNC | Performed by: INTERNAL MEDICINE

## 2021-10-21 PROCEDURE — 25000003 PHARM REV CODE 250: Mod: HCNC | Performed by: INTERNAL MEDICINE

## 2021-10-21 PROCEDURE — 96401 CHEMO ANTI-NEOPL SQ/IM: CPT | Mod: HCNC

## 2021-10-21 RX ORDER — DIPHENHYDRAMINE HCL 25 MG
25 CAPSULE ORAL
Status: COMPLETED | OUTPATIENT
Start: 2021-10-21 | End: 2021-10-21

## 2021-10-21 RX ORDER — DEXAMETHASONE 4 MG/1
20 TABLET ORAL
Status: CANCELLED | OUTPATIENT
Start: 2021-10-28

## 2021-10-21 RX ORDER — DIPHENHYDRAMINE HYDROCHLORIDE 50 MG/ML
50 INJECTION INTRAMUSCULAR; INTRAVENOUS ONCE AS NEEDED
Status: CANCELLED | OUTPATIENT
Start: 2021-10-21

## 2021-10-21 RX ORDER — DIPHENHYDRAMINE HYDROCHLORIDE 50 MG/ML
50 INJECTION INTRAMUSCULAR; INTRAVENOUS ONCE AS NEEDED
Status: CANCELLED | OUTPATIENT
Start: 2021-11-04

## 2021-10-21 RX ORDER — BORTEZOMIB 3.5 MG/1
1.3 INJECTION, POWDER, LYOPHILIZED, FOR SOLUTION INTRAVENOUS; SUBCUTANEOUS
Status: CANCELLED | OUTPATIENT
Start: 2021-11-04

## 2021-10-21 RX ORDER — BORTEZOMIB 3.5 MG/1
1.3 INJECTION, POWDER, LYOPHILIZED, FOR SOLUTION INTRAVENOUS; SUBCUTANEOUS
Status: COMPLETED | OUTPATIENT
Start: 2021-10-21 | End: 2021-10-21

## 2021-10-21 RX ORDER — DIPHENHYDRAMINE HCL 25 MG
25 CAPSULE ORAL
Status: CANCELLED | OUTPATIENT
Start: 2021-10-21

## 2021-10-21 RX ORDER — EPINEPHRINE 0.3 MG/.3ML
0.3 INJECTION SUBCUTANEOUS ONCE AS NEEDED
Status: CANCELLED | OUTPATIENT
Start: 2021-10-21

## 2021-10-21 RX ORDER — BORTEZOMIB 3.5 MG/1
1.3 INJECTION, POWDER, LYOPHILIZED, FOR SOLUTION INTRAVENOUS; SUBCUTANEOUS
Status: CANCELLED | OUTPATIENT
Start: 2021-11-11

## 2021-10-21 RX ORDER — DIPHENHYDRAMINE HYDROCHLORIDE 50 MG/ML
50 INJECTION INTRAMUSCULAR; INTRAVENOUS ONCE AS NEEDED
Status: DISCONTINUED | OUTPATIENT
Start: 2021-10-21 | End: 2021-10-21 | Stop reason: HOSPADM

## 2021-10-21 RX ORDER — DIPHENHYDRAMINE HYDROCHLORIDE 50 MG/ML
50 INJECTION INTRAMUSCULAR; INTRAVENOUS ONCE AS NEEDED
Status: CANCELLED | OUTPATIENT
Start: 2021-10-28

## 2021-10-21 RX ORDER — ACETAMINOPHEN 325 MG/1
650 TABLET ORAL
Status: COMPLETED | OUTPATIENT
Start: 2021-10-21 | End: 2021-10-21

## 2021-10-21 RX ORDER — BORTEZOMIB 3.5 MG/1
1.3 INJECTION, POWDER, LYOPHILIZED, FOR SOLUTION INTRAVENOUS; SUBCUTANEOUS
Status: CANCELLED | OUTPATIENT
Start: 2021-10-21

## 2021-10-21 RX ORDER — EPINEPHRINE 0.3 MG/.3ML
0.3 INJECTION SUBCUTANEOUS ONCE AS NEEDED
Status: CANCELLED | OUTPATIENT
Start: 2021-11-04

## 2021-10-21 RX ORDER — DIPHENHYDRAMINE HCL 25 MG
25 CAPSULE ORAL
Status: CANCELLED | OUTPATIENT
Start: 2021-11-04

## 2021-10-21 RX ORDER — BORTEZOMIB 3.5 MG/1
1.3 INJECTION, POWDER, LYOPHILIZED, FOR SOLUTION INTRAVENOUS; SUBCUTANEOUS
Status: CANCELLED | OUTPATIENT
Start: 2021-10-28

## 2021-10-21 RX ORDER — DEXAMETHASONE 4 MG/1
20 TABLET ORAL
Status: COMPLETED | OUTPATIENT
Start: 2021-10-21 | End: 2021-10-21

## 2021-10-21 RX ORDER — ACETAMINOPHEN 325 MG/1
650 TABLET ORAL
Status: CANCELLED | OUTPATIENT
Start: 2021-11-04

## 2021-10-21 RX ORDER — DEXAMETHASONE 4 MG/1
20 TABLET ORAL
Status: CANCELLED | OUTPATIENT
Start: 2021-10-21

## 2021-10-21 RX ORDER — ACETAMINOPHEN 325 MG/1
650 TABLET ORAL
Status: CANCELLED | OUTPATIENT
Start: 2021-10-21

## 2021-10-21 RX ORDER — EPINEPHRINE 0.3 MG/.3ML
0.3 INJECTION SUBCUTANEOUS ONCE AS NEEDED
Status: DISCONTINUED | OUTPATIENT
Start: 2021-10-21 | End: 2021-10-21 | Stop reason: HOSPADM

## 2021-10-21 RX ORDER — DEXAMETHASONE 4 MG/1
20 TABLET ORAL
Status: CANCELLED | OUTPATIENT
Start: 2021-11-11

## 2021-10-21 RX ORDER — DIPHENHYDRAMINE HYDROCHLORIDE 50 MG/ML
50 INJECTION INTRAMUSCULAR; INTRAVENOUS ONCE AS NEEDED
Status: CANCELLED | OUTPATIENT
Start: 2021-11-11

## 2021-10-21 RX ORDER — EPINEPHRINE 0.3 MG/.3ML
0.3 INJECTION SUBCUTANEOUS ONCE AS NEEDED
Status: CANCELLED | OUTPATIENT
Start: 2021-10-28

## 2021-10-21 RX ORDER — DEXAMETHASONE 4 MG/1
20 TABLET ORAL
Status: CANCELLED | OUTPATIENT
Start: 2021-11-04

## 2021-10-21 RX ORDER — EPINEPHRINE 0.3 MG/.3ML
0.3 INJECTION SUBCUTANEOUS ONCE AS NEEDED
Status: CANCELLED | OUTPATIENT
Start: 2021-11-11

## 2021-10-21 RX ADMIN — BORTEZOMIB 2.5 MG: 3.5 INJECTION, POWDER, LYOPHILIZED, FOR SOLUTION INTRAVENOUS; SUBCUTANEOUS at 12:10

## 2021-10-21 RX ADMIN — DIPHENHYDRAMINE HYDROCHLORIDE 25 MG: 25 CAPSULE ORAL at 11:10

## 2021-10-21 RX ADMIN — DEXAMETHASONE 20 MG: 4 TABLET ORAL at 11:10

## 2021-10-21 RX ADMIN — ACETAMINOPHEN 650 MG: 325 TABLET ORAL at 11:10

## 2021-10-21 RX ADMIN — DARATUMUMAB AND HYALURONIDASE-FIHJ (HUMAN RECOMBINANT) 1800 MG: 1800; 30000 INJECTION SUBCUTANEOUS at 12:10

## 2021-10-28 ENCOUNTER — INFUSION (OUTPATIENT)
Dept: INFUSION THERAPY | Facility: HOSPITAL | Age: 71
End: 2021-10-28
Payer: MEDICARE

## 2021-10-28 VITALS
SYSTOLIC BLOOD PRESSURE: 134 MMHG | DIASTOLIC BLOOD PRESSURE: 80 MMHG | TEMPERATURE: 98 F | RESPIRATION RATE: 18 BRPM | HEART RATE: 82 BPM

## 2021-10-28 DIAGNOSIS — C90.02 MULTIPLE MYELOMA IN RELAPSE: ICD-10-CM

## 2021-10-28 DIAGNOSIS — C90.01 MULTIPLE MYELOMA IN REMISSION: Primary | ICD-10-CM

## 2021-10-28 PROCEDURE — 96401 CHEMO ANTI-NEOPL SQ/IM: CPT | Mod: HCNC

## 2021-10-28 PROCEDURE — 63600175 PHARM REV CODE 636 W HCPCS: Mod: HCNC | Performed by: INTERNAL MEDICINE

## 2021-10-28 RX ORDER — DIPHENHYDRAMINE HYDROCHLORIDE 50 MG/ML
50 INJECTION INTRAMUSCULAR; INTRAVENOUS ONCE AS NEEDED
Status: DISCONTINUED | OUTPATIENT
Start: 2021-10-28 | End: 2021-10-28 | Stop reason: HOSPADM

## 2021-10-28 RX ORDER — EPINEPHRINE 0.3 MG/.3ML
0.3 INJECTION SUBCUTANEOUS ONCE AS NEEDED
Status: DISCONTINUED | OUTPATIENT
Start: 2021-10-28 | End: 2021-10-28 | Stop reason: HOSPADM

## 2021-10-28 RX ORDER — BORTEZOMIB 3.5 MG/1
1.3 INJECTION, POWDER, LYOPHILIZED, FOR SOLUTION INTRAVENOUS; SUBCUTANEOUS
Status: COMPLETED | OUTPATIENT
Start: 2021-10-28 | End: 2021-10-28

## 2021-10-28 RX ORDER — DEXAMETHASONE 4 MG/1
20 TABLET ORAL
Status: COMPLETED | OUTPATIENT
Start: 2021-10-28 | End: 2021-10-28

## 2021-10-28 RX ADMIN — DEXAMETHASONE 20 MG: 4 TABLET ORAL at 11:10

## 2021-10-28 RX ADMIN — BORTEZOMIB 2.5 MG: 3.5 INJECTION, POWDER, LYOPHILIZED, FOR SOLUTION INTRAVENOUS; SUBCUTANEOUS at 12:10

## 2021-11-04 ENCOUNTER — INFUSION (OUTPATIENT)
Dept: INFUSION THERAPY | Facility: HOSPITAL | Age: 71
End: 2021-11-04
Payer: MEDICARE

## 2021-11-04 VITALS
TEMPERATURE: 98 F | BODY MASS INDEX: 32.38 KG/M2 | SYSTOLIC BLOOD PRESSURE: 120 MMHG | HEART RATE: 72 BPM | DIASTOLIC BLOOD PRESSURE: 75 MMHG | WEIGHT: 182.75 LBS | HEIGHT: 63 IN | RESPIRATION RATE: 18 BRPM

## 2021-11-04 DIAGNOSIS — K12.30 MUCOSITIS: Primary | ICD-10-CM

## 2021-11-04 DIAGNOSIS — C90.01 MULTIPLE MYELOMA IN REMISSION: Primary | ICD-10-CM

## 2021-11-04 DIAGNOSIS — C90.02 MULTIPLE MYELOMA IN RELAPSE: ICD-10-CM

## 2021-11-04 PROCEDURE — 63600175 PHARM REV CODE 636 W HCPCS: Mod: TB,HCNC | Performed by: INTERNAL MEDICINE

## 2021-11-04 PROCEDURE — 25000003 PHARM REV CODE 250: Mod: HCNC | Performed by: INTERNAL MEDICINE

## 2021-11-04 PROCEDURE — 96401 CHEMO ANTI-NEOPL SQ/IM: CPT | Mod: HCNC

## 2021-11-04 RX ORDER — ACETAMINOPHEN 325 MG/1
650 TABLET ORAL
Status: COMPLETED | OUTPATIENT
Start: 2021-11-04 | End: 2021-11-04

## 2021-11-04 RX ORDER — DIPHENHYDRAMINE HYDROCHLORIDE 50 MG/ML
50 INJECTION INTRAMUSCULAR; INTRAVENOUS ONCE AS NEEDED
Status: DISCONTINUED | OUTPATIENT
Start: 2021-11-04 | End: 2021-11-04 | Stop reason: HOSPADM

## 2021-11-04 RX ORDER — DIPHENHYDRAMINE HCL 25 MG
25 CAPSULE ORAL
Status: COMPLETED | OUTPATIENT
Start: 2021-11-04 | End: 2021-11-04

## 2021-11-04 RX ORDER — EPINEPHRINE 0.3 MG/.3ML
0.3 INJECTION SUBCUTANEOUS ONCE AS NEEDED
Status: DISCONTINUED | OUTPATIENT
Start: 2021-11-04 | End: 2021-11-04 | Stop reason: HOSPADM

## 2021-11-04 RX ORDER — DEXAMETHASONE 4 MG/1
20 TABLET ORAL
Status: COMPLETED | OUTPATIENT
Start: 2021-11-04 | End: 2021-11-04

## 2021-11-04 RX ORDER — BORTEZOMIB 3.5 MG/1
1.3 INJECTION, POWDER, LYOPHILIZED, FOR SOLUTION INTRAVENOUS; SUBCUTANEOUS
Status: COMPLETED | OUTPATIENT
Start: 2021-11-04 | End: 2021-11-04

## 2021-11-04 RX ADMIN — BORTEZOMIB 2.5 MG: 3.5 INJECTION, POWDER, LYOPHILIZED, FOR SOLUTION INTRAVENOUS; SUBCUTANEOUS at 01:11

## 2021-11-04 RX ADMIN — DIPHENHYDRAMINE HYDROCHLORIDE 25 MG: 25 CAPSULE ORAL at 12:11

## 2021-11-04 RX ADMIN — DEXAMETHASONE 20 MG: 4 TABLET ORAL at 12:11

## 2021-11-04 RX ADMIN — DARATUMUMAB AND HYALURONIDASE-FIHJ (HUMAN RECOMBINANT) 1800 MG: 1800; 30000 INJECTION SUBCUTANEOUS at 01:11

## 2021-11-04 RX ADMIN — ACETAMINOPHEN 650 MG: 325 TABLET ORAL at 12:11

## 2021-11-08 ENCOUNTER — HOSPITAL ENCOUNTER (EMERGENCY)
Facility: HOSPITAL | Age: 71
Discharge: HOME | End: 2021-11-08
Attending: EMERGENCY MEDICINE
Payer: MEDICARE

## 2021-11-08 VITALS
HEIGHT: 63 IN | RESPIRATION RATE: 16 BRPM | TEMPERATURE: 98 F | WEIGHT: 175 LBS | DIASTOLIC BLOOD PRESSURE: 67 MMHG | SYSTOLIC BLOOD PRESSURE: 114 MMHG | BODY MASS INDEX: 31.01 KG/M2 | OXYGEN SATURATION: 96 % | HEART RATE: 68 BPM

## 2021-11-08 DIAGNOSIS — R07.9 CHEST PAIN: ICD-10-CM

## 2021-11-08 DIAGNOSIS — R11.2 NON-INTRACTABLE VOMITING WITH NAUSEA, UNSPECIFIED VOMITING TYPE: Primary | ICD-10-CM

## 2021-11-08 LAB
ALBUMIN SERPL BCP-MCNC: 3.6 G/DL (ref 3.5–5.2)
ALP SERPL-CCNC: 59 U/L (ref 55–135)
ALT SERPL W/O P-5'-P-CCNC: 14 U/L (ref 10–44)
ANION GAP SERPL CALC-SCNC: 10 MMOL/L (ref 8–16)
AST SERPL-CCNC: 18 U/L (ref 10–40)
BASOPHILS # BLD AUTO: 0 K/UL (ref 0–0.2)
BASOPHILS NFR BLD: 0 % (ref 0–1.9)
BILIRUB SERPL-MCNC: 0.4 MG/DL (ref 0.1–1)
BNP SERPL-MCNC: 20 PG/ML (ref 0–99)
BUN SERPL-MCNC: 7 MG/DL (ref 6–30)
BUN SERPL-MCNC: 8 MG/DL (ref 8–23)
CALCIUM SERPL-MCNC: 8.7 MG/DL (ref 8.7–10.5)
CHLORIDE SERPL-SCNC: 102 MMOL/L (ref 95–110)
CHLORIDE SERPL-SCNC: 103 MMOL/L (ref 95–110)
CO2 SERPL-SCNC: 25 MMOL/L (ref 23–29)
CREAT SERPL-MCNC: 0.7 MG/DL (ref 0.5–1.4)
CREAT SERPL-MCNC: 0.8 MG/DL (ref 0.5–1.4)
DIFFERENTIAL METHOD: ABNORMAL
EOSINOPHIL # BLD AUTO: 0 K/UL (ref 0–0.5)
EOSINOPHIL NFR BLD: 0.7 % (ref 0–8)
ERYTHROCYTE [DISTWIDTH] IN BLOOD BY AUTOMATED COUNT: 18 % (ref 11.5–14.5)
EST. GFR  (AFRICAN AMERICAN): >60 ML/MIN/1.73 M^2
EST. GFR  (NON AFRICAN AMERICAN): >60 ML/MIN/1.73 M^2
GLUCOSE SERPL-MCNC: 239 MG/DL (ref 70–110)
GLUCOSE SERPL-MCNC: 248 MG/DL (ref 70–110)
HCT VFR BLD AUTO: 33.7 % (ref 37–48.5)
HCT VFR BLD CALC: 34 %PCV (ref 36–54)
HGB BLD-MCNC: 10.4 G/DL (ref 12–16)
IMM GRANULOCYTES # BLD AUTO: 0.02 K/UL (ref 0–0.04)
IMM GRANULOCYTES NFR BLD AUTO: 0.7 % (ref 0–0.5)
LYMPHOCYTES # BLD AUTO: 0.7 K/UL (ref 1–4.8)
LYMPHOCYTES NFR BLD: 21.4 % (ref 18–48)
MAGNESIUM SERPL-MCNC: 1.9 MG/DL (ref 1.6–2.6)
MCH RBC QN AUTO: 26.4 PG (ref 27–31)
MCHC RBC AUTO-ENTMCNC: 30.9 G/DL (ref 32–36)
MCV RBC AUTO: 86 FL (ref 82–98)
MONOCYTES # BLD AUTO: 0.4 K/UL (ref 0.3–1)
MONOCYTES NFR BLD: 12.5 % (ref 4–15)
NEUTROPHILS # BLD AUTO: 2 K/UL (ref 1.8–7.7)
NEUTROPHILS NFR BLD: 64.7 % (ref 38–73)
NRBC BLD-RTO: 0 /100 WBC
PLATELET # BLD AUTO: 99 K/UL (ref 150–450)
PMV BLD AUTO: ABNORMAL FL (ref 9.2–12.9)
POC IONIZED CALCIUM: 0.89 MMOL/L (ref 1.06–1.42)
POC TCO2 (MEASURED): 25 MMOL/L (ref 23–29)
POTASSIUM BLD-SCNC: 4.2 MMOL/L (ref 3.5–5.1)
POTASSIUM SERPL-SCNC: 4.4 MMOL/L (ref 3.5–5.1)
PROT SERPL-MCNC: 6.5 G/DL (ref 6–8.4)
RBC # BLD AUTO: 3.94 M/UL (ref 4–5.4)
SAMPLE: ABNORMAL
SODIUM BLD-SCNC: 136 MMOL/L (ref 136–145)
SODIUM SERPL-SCNC: 137 MMOL/L (ref 136–145)
TROPONIN I SERPL DL<=0.01 NG/ML-MCNC: <0.006 NG/ML (ref 0–0.03)
WBC # BLD AUTO: 3.04 K/UL (ref 3.9–12.7)

## 2021-11-08 PROCEDURE — 99284 PR EMERGENCY DEPT VISIT,LEVEL IV: ICD-10-PCS | Mod: HCNC,BMT,, | Performed by: PHYSICIAN ASSISTANT

## 2021-11-08 PROCEDURE — 93010 EKG 12-LEAD: ICD-10-PCS | Mod: HCNC,BMT,, | Performed by: INTERNAL MEDICINE

## 2021-11-08 PROCEDURE — 84484 ASSAY OF TROPONIN QUANT: CPT | Mod: HCNC | Performed by: PHYSICIAN ASSISTANT

## 2021-11-08 PROCEDURE — 96361 HYDRATE IV INFUSION ADD-ON: CPT | Mod: HCNC

## 2021-11-08 PROCEDURE — 96374 THER/PROPH/DIAG INJ IV PUSH: CPT | Mod: HCNC

## 2021-11-08 PROCEDURE — 93010 ELECTROCARDIOGRAM REPORT: CPT | Mod: HCNC,BMT,, | Performed by: INTERNAL MEDICINE

## 2021-11-08 PROCEDURE — 93005 ELECTROCARDIOGRAM TRACING: CPT | Mod: HCNC

## 2021-11-08 PROCEDURE — 99284 EMERGENCY DEPT VISIT MOD MDM: CPT | Mod: 25,HCNC

## 2021-11-08 PROCEDURE — 63600175 PHARM REV CODE 636 W HCPCS: Mod: HCNC | Performed by: PHYSICIAN ASSISTANT

## 2021-11-08 PROCEDURE — 80053 COMPREHEN METABOLIC PANEL: CPT | Mod: HCNC | Performed by: PHYSICIAN ASSISTANT

## 2021-11-08 PROCEDURE — 83880 ASSAY OF NATRIURETIC PEPTIDE: CPT | Mod: HCNC | Performed by: PHYSICIAN ASSISTANT

## 2021-11-08 PROCEDURE — 25000003 PHARM REV CODE 250: Mod: HCNC | Performed by: PHYSICIAN ASSISTANT

## 2021-11-08 PROCEDURE — 83735 ASSAY OF MAGNESIUM: CPT | Mod: HCNC | Performed by: PHYSICIAN ASSISTANT

## 2021-11-08 PROCEDURE — 85025 COMPLETE CBC W/AUTO DIFF WBC: CPT | Mod: HCNC | Performed by: PHYSICIAN ASSISTANT

## 2021-11-08 PROCEDURE — 99284 EMERGENCY DEPT VISIT MOD MDM: CPT | Mod: HCNC,BMT,, | Performed by: PHYSICIAN ASSISTANT

## 2021-11-08 RX ORDER — ONDANSETRON 4 MG/1
4 TABLET, ORALLY DISINTEGRATING ORAL EVERY 6 HOURS PRN
Qty: 12 TABLET | Refills: 0 | Status: SHIPPED | OUTPATIENT
Start: 2021-11-08 | End: 2022-07-18

## 2021-11-08 RX ORDER — ASPIRIN 325 MG
325 TABLET ORAL
Status: COMPLETED | OUTPATIENT
Start: 2021-11-08 | End: 2021-11-08

## 2021-11-08 RX ORDER — ONDANSETRON 2 MG/ML
4 INJECTION INTRAMUSCULAR; INTRAVENOUS
Status: COMPLETED | OUTPATIENT
Start: 2021-11-08 | End: 2021-11-08

## 2021-11-08 RX ADMIN — SODIUM CHLORIDE 1000 ML: 0.9 INJECTION, SOLUTION INTRAVENOUS at 02:11

## 2021-11-08 RX ADMIN — ASPIRIN 325 MG ORAL TABLET 325 MG: 325 PILL ORAL at 02:11

## 2021-11-08 RX ADMIN — ONDANSETRON 4 MG: 2 INJECTION INTRAMUSCULAR; INTRAVENOUS at 02:11

## 2021-11-11 ENCOUNTER — TELEPHONE (OUTPATIENT)
Dept: INTERNAL MEDICINE | Facility: CLINIC | Age: 71
End: 2021-11-11
Payer: MEDICARE

## 2021-11-11 ENCOUNTER — LAB VISIT (OUTPATIENT)
Dept: LAB | Facility: HOSPITAL | Age: 71
End: 2021-11-11
Attending: INTERNAL MEDICINE
Payer: MEDICARE

## 2021-11-11 ENCOUNTER — PATIENT MESSAGE (OUTPATIENT)
Dept: CARDIOLOGY | Facility: CLINIC | Age: 71
End: 2021-11-11
Payer: MEDICARE

## 2021-11-11 ENCOUNTER — INFUSION (OUTPATIENT)
Dept: INFUSION THERAPY | Facility: HOSPITAL | Age: 71
End: 2021-11-11
Attending: INTERNAL MEDICINE
Payer: MEDICARE

## 2021-11-11 ENCOUNTER — OFFICE VISIT (OUTPATIENT)
Dept: HEMATOLOGY/ONCOLOGY | Facility: CLINIC | Age: 71
End: 2021-11-11
Payer: MEDICARE

## 2021-11-11 VITALS
OXYGEN SATURATION: 96 % | RESPIRATION RATE: 16 BRPM | SYSTOLIC BLOOD PRESSURE: 131 MMHG | HEART RATE: 74 BPM | TEMPERATURE: 98 F | DIASTOLIC BLOOD PRESSURE: 69 MMHG

## 2021-11-11 VITALS
TEMPERATURE: 98 F | HEART RATE: 80 BPM | DIASTOLIC BLOOD PRESSURE: 77 MMHG | SYSTOLIC BLOOD PRESSURE: 127 MMHG | RESPIRATION RATE: 18 BRPM

## 2021-11-11 DIAGNOSIS — F41.9 ANXIETY AND DEPRESSION: ICD-10-CM

## 2021-11-11 DIAGNOSIS — C90.02 MULTIPLE MYELOMA IN RELAPSE: ICD-10-CM

## 2021-11-11 DIAGNOSIS — R06.00 DYSPNEA, UNSPECIFIED TYPE: ICD-10-CM

## 2021-11-11 DIAGNOSIS — G25.2 COARSE TREMORS: Primary | ICD-10-CM

## 2021-11-11 DIAGNOSIS — Z12.31 OTHER SCREENING MAMMOGRAM: Primary | ICD-10-CM

## 2021-11-11 DIAGNOSIS — E78.00 PURE HYPERCHOLESTEROLEMIA: ICD-10-CM

## 2021-11-11 DIAGNOSIS — D75.9 DRUG-INDUCED CYTOPENIA: ICD-10-CM

## 2021-11-11 DIAGNOSIS — C80.1 NEUROPATHY ASSOCIATED WITH CANCER: ICD-10-CM

## 2021-11-11 DIAGNOSIS — C90.00 MULTIPLE MYELOMA, REMISSION STATUS UNSPECIFIED: ICD-10-CM

## 2021-11-11 DIAGNOSIS — Z78.9 STATIN INTOLERANCE: ICD-10-CM

## 2021-11-11 DIAGNOSIS — I71.9 DESCENDING AORTIC ANEURYSM: ICD-10-CM

## 2021-11-11 DIAGNOSIS — G63 NEUROPATHY ASSOCIATED WITH CANCER: ICD-10-CM

## 2021-11-11 DIAGNOSIS — E78.00 PURE HYPERCHOLESTEROLEMIA: Primary | ICD-10-CM

## 2021-11-11 DIAGNOSIS — I10 ESSENTIAL HYPERTENSION: ICD-10-CM

## 2021-11-11 DIAGNOSIS — F32.A ANXIETY AND DEPRESSION: ICD-10-CM

## 2021-11-11 DIAGNOSIS — T50.905A DRUG-INDUCED CYTOPENIA: ICD-10-CM

## 2021-11-11 DIAGNOSIS — E11.9 TYPE 2 DIABETES MELLITUS WITHOUT COMPLICATION, WITHOUT LONG-TERM CURRENT USE OF INSULIN: ICD-10-CM

## 2021-11-11 DIAGNOSIS — C90.01 MULTIPLE MYELOMA IN REMISSION: Primary | ICD-10-CM

## 2021-11-11 LAB
ALBUMIN SERPL BCP-MCNC: 3.7 G/DL (ref 3.5–5.2)
ALP SERPL-CCNC: 69 U/L (ref 55–135)
ALT SERPL W/O P-5'-P-CCNC: 16 U/L (ref 10–44)
ANION GAP SERPL CALC-SCNC: 11 MMOL/L (ref 8–16)
AST SERPL-CCNC: 12 U/L (ref 10–40)
BASOPHILS # BLD AUTO: 0 K/UL (ref 0–0.2)
BASOPHILS NFR BLD: 0 % (ref 0–1.9)
BILIRUB SERPL-MCNC: 0.4 MG/DL (ref 0.1–1)
BUN SERPL-MCNC: 8 MG/DL (ref 8–23)
CALCIUM SERPL-MCNC: 8.7 MG/DL (ref 8.7–10.5)
CHLORIDE SERPL-SCNC: 104 MMOL/L (ref 95–110)
CO2 SERPL-SCNC: 25 MMOL/L (ref 23–29)
CREAT SERPL-MCNC: 1 MG/DL (ref 0.5–1.4)
DIFFERENTIAL METHOD: ABNORMAL
EOSINOPHIL # BLD AUTO: 0 K/UL (ref 0–0.5)
EOSINOPHIL NFR BLD: 0.6 % (ref 0–8)
ERYTHROCYTE [DISTWIDTH] IN BLOOD BY AUTOMATED COUNT: 18.4 % (ref 11.5–14.5)
EST. GFR  (AFRICAN AMERICAN): >60 ML/MIN/1.73 M^2
EST. GFR  (NON AFRICAN AMERICAN): 56.4 ML/MIN/1.73 M^2
GLUCOSE SERPL-MCNC: 351 MG/DL (ref 70–110)
HCT VFR BLD AUTO: 34.6 % (ref 37–48.5)
HGB BLD-MCNC: 10.6 G/DL (ref 12–16)
IGA SERPL-MCNC: 26 MG/DL (ref 40–350)
IGG SERPL-MCNC: 585 MG/DL (ref 650–1600)
IGM SERPL-MCNC: 14 MG/DL (ref 50–300)
IMM GRANULOCYTES # BLD AUTO: 0.02 K/UL (ref 0–0.04)
IMM GRANULOCYTES NFR BLD AUTO: 0.6 % (ref 0–0.5)
LYMPHOCYTES # BLD AUTO: 0.7 K/UL (ref 1–4.8)
LYMPHOCYTES NFR BLD: 20.5 % (ref 18–48)
MCH RBC QN AUTO: 25.8 PG (ref 27–31)
MCHC RBC AUTO-ENTMCNC: 30.6 G/DL (ref 32–36)
MCV RBC AUTO: 84 FL (ref 82–98)
MONOCYTES # BLD AUTO: 0.4 K/UL (ref 0.3–1)
MONOCYTES NFR BLD: 11.8 % (ref 4–15)
NEUTROPHILS # BLD AUTO: 2.1 K/UL (ref 1.8–7.7)
NEUTROPHILS NFR BLD: 66.5 % (ref 38–73)
NRBC BLD-RTO: 0 /100 WBC
PLATELET # BLD AUTO: 114 K/UL (ref 150–450)
PMV BLD AUTO: ABNORMAL FL (ref 9.2–12.9)
POTASSIUM SERPL-SCNC: 3.9 MMOL/L (ref 3.5–5.1)
PROT SERPL-MCNC: 6.2 G/DL (ref 6–8.4)
RBC # BLD AUTO: 4.11 M/UL (ref 4–5.4)
SODIUM SERPL-SCNC: 140 MMOL/L (ref 136–145)
WBC # BLD AUTO: 3.22 K/UL (ref 3.9–12.7)

## 2021-11-11 PROCEDURE — 3075F SYST BP GE 130 - 139MM HG: CPT | Mod: HCNC,BMT,CPTII,S$GLB | Performed by: NURSE PRACTITIONER

## 2021-11-11 PROCEDURE — 96401 CHEMO ANTI-NEOPL SQ/IM: CPT | Mod: HCNC

## 2021-11-11 PROCEDURE — 86334 IMMUNOFIX E-PHORESIS SERUM: CPT | Mod: 26,HCNC,BMT, | Performed by: PATHOLOGY

## 2021-11-11 PROCEDURE — 3075F PR MOST RECENT SYSTOLIC BLOOD PRESS GE 130-139MM HG: ICD-10-PCS | Mod: HCNC,BMT,CPTII,S$GLB | Performed by: NURSE PRACTITIONER

## 2021-11-11 PROCEDURE — 84165 PROTEIN E-PHORESIS SERUM: CPT | Mod: HCNC | Performed by: INTERNAL MEDICINE

## 2021-11-11 PROCEDURE — 3078F PR MOST RECENT DIASTOLIC BLOOD PRESSURE < 80 MM HG: ICD-10-PCS | Mod: HCNC,BMT,CPTII,S$GLB | Performed by: NURSE PRACTITIONER

## 2021-11-11 PROCEDURE — 3288F PR FALLS RISK ASSESSMENT DOCUMENTED: ICD-10-PCS | Mod: HCNC,BMT,CPTII,S$GLB | Performed by: NURSE PRACTITIONER

## 2021-11-11 PROCEDURE — 1159F PR MEDICATION LIST DOCUMENTED IN MEDICAL RECORD: ICD-10-PCS | Mod: HCNC,BMT,CPTII,S$GLB | Performed by: NURSE PRACTITIONER

## 2021-11-11 PROCEDURE — 1160F PR REVIEW ALL MEDS BY PRESCRIBER/CLIN PHARMACIST DOCUMENTED: ICD-10-PCS | Mod: HCNC,BMT,CPTII,S$GLB | Performed by: NURSE PRACTITIONER

## 2021-11-11 PROCEDURE — 99215 OFFICE O/P EST HI 40 MIN: CPT | Mod: HCNC,BMT,S$GLB, | Performed by: NURSE PRACTITIONER

## 2021-11-11 PROCEDURE — 1126F AMNT PAIN NOTED NONE PRSNT: CPT | Mod: HCNC,BMT,CPTII,S$GLB | Performed by: NURSE PRACTITIONER

## 2021-11-11 PROCEDURE — 3051F HG A1C>EQUAL 7.0%<8.0%: CPT | Mod: HCNC,BMT,CPTII,S$GLB | Performed by: NURSE PRACTITIONER

## 2021-11-11 PROCEDURE — 1101F PR PT FALLS ASSESS DOC 0-1 FALLS W/OUT INJ PAST YR: ICD-10-PCS | Mod: HCNC,BMT,CPTII,S$GLB | Performed by: NURSE PRACTITIONER

## 2021-11-11 PROCEDURE — 63600175 PHARM REV CODE 636 W HCPCS: Mod: HCNC | Performed by: INTERNAL MEDICINE

## 2021-11-11 PROCEDURE — 84165 PROTEIN E-PHORESIS SERUM: CPT | Mod: 26,HCNC,BMT, | Performed by: PATHOLOGY

## 2021-11-11 PROCEDURE — 86334 PATHOLOGIST INTERPRETATION IFE: ICD-10-PCS | Mod: 26,HCNC,BMT, | Performed by: PATHOLOGY

## 2021-11-11 PROCEDURE — 1159F MED LIST DOCD IN RCRD: CPT | Mod: HCNC,BMT,CPTII,S$GLB | Performed by: NURSE PRACTITIONER

## 2021-11-11 PROCEDURE — 3072F PR LOW RISK FOR RETINOPATHY: ICD-10-PCS | Mod: HCNC,BMT,CPTII,S$GLB | Performed by: NURSE PRACTITIONER

## 2021-11-11 PROCEDURE — 80053 COMPREHEN METABOLIC PANEL: CPT | Mod: HCNC | Performed by: INTERNAL MEDICINE

## 2021-11-11 PROCEDURE — 82784 ASSAY IGA/IGD/IGG/IGM EACH: CPT | Mod: HCNC | Performed by: INTERNAL MEDICINE

## 2021-11-11 PROCEDURE — 1160F RVW MEDS BY RX/DR IN RCRD: CPT | Mod: HCNC,BMT,CPTII,S$GLB | Performed by: NURSE PRACTITIONER

## 2021-11-11 PROCEDURE — 99215 PR OFFICE/OUTPT VISIT, EST, LEVL V, 40-54 MIN: ICD-10-PCS | Mod: HCNC,BMT,S$GLB, | Performed by: NURSE PRACTITIONER

## 2021-11-11 PROCEDURE — 3072F LOW RISK FOR RETINOPATHY: CPT | Mod: HCNC,BMT,CPTII,S$GLB | Performed by: NURSE PRACTITIONER

## 2021-11-11 PROCEDURE — 1101F PT FALLS ASSESS-DOCD LE1/YR: CPT | Mod: HCNC,BMT,CPTII,S$GLB | Performed by: NURSE PRACTITIONER

## 2021-11-11 PROCEDURE — 36415 COLL VENOUS BLD VENIPUNCTURE: CPT | Mod: HCNC | Performed by: INTERNAL MEDICINE

## 2021-11-11 PROCEDURE — 3078F DIAST BP <80 MM HG: CPT | Mod: HCNC,BMT,CPTII,S$GLB | Performed by: NURSE PRACTITIONER

## 2021-11-11 PROCEDURE — 3288F FALL RISK ASSESSMENT DOCD: CPT | Mod: HCNC,BMT,CPTII,S$GLB | Performed by: NURSE PRACTITIONER

## 2021-11-11 PROCEDURE — 83520 IMMUNOASSAY QUANT NOS NONAB: CPT | Mod: HCNC | Performed by: INTERNAL MEDICINE

## 2021-11-11 PROCEDURE — 84165 PATHOLOGIST INTERPRETATION SPE: ICD-10-PCS | Mod: 26,HCNC,BMT, | Performed by: PATHOLOGY

## 2021-11-11 PROCEDURE — 3051F PR MOST RECENT HEMOGLOBIN A1C LEVEL 7.0 - < 8.0%: ICD-10-PCS | Mod: HCNC,BMT,CPTII,S$GLB | Performed by: NURSE PRACTITIONER

## 2021-11-11 PROCEDURE — 85025 COMPLETE CBC W/AUTO DIFF WBC: CPT | Mod: HCNC | Performed by: INTERNAL MEDICINE

## 2021-11-11 PROCEDURE — 99999 PR PBB SHADOW E&M-EST. PATIENT-LVL IV: CPT | Mod: PBBFAC,HCNC,BMT, | Performed by: NURSE PRACTITIONER

## 2021-11-11 PROCEDURE — 86334 IMMUNOFIX E-PHORESIS SERUM: CPT | Mod: HCNC | Performed by: INTERNAL MEDICINE

## 2021-11-11 PROCEDURE — 99999 PR PBB SHADOW E&M-EST. PATIENT-LVL IV: ICD-10-PCS | Mod: PBBFAC,HCNC,BMT, | Performed by: NURSE PRACTITIONER

## 2021-11-11 PROCEDURE — 1126F PR PAIN SEVERITY QUANTIFIED, NO PAIN PRESENT: ICD-10-PCS | Mod: HCNC,BMT,CPTII,S$GLB | Performed by: NURSE PRACTITIONER

## 2021-11-11 RX ORDER — DEXAMETHASONE 4 MG/1
20 TABLET ORAL
Status: COMPLETED | OUTPATIENT
Start: 2021-11-11 | End: 2021-11-11

## 2021-11-11 RX ORDER — DIPHENHYDRAMINE HYDROCHLORIDE 50 MG/ML
50 INJECTION INTRAMUSCULAR; INTRAVENOUS ONCE AS NEEDED
Status: DISCONTINUED | OUTPATIENT
Start: 2021-11-11 | End: 2021-11-11 | Stop reason: HOSPADM

## 2021-11-11 RX ORDER — BORTEZOMIB 3.5 MG/1
1.3 INJECTION, POWDER, LYOPHILIZED, FOR SOLUTION INTRAVENOUS; SUBCUTANEOUS
Status: COMPLETED | OUTPATIENT
Start: 2021-11-11 | End: 2021-11-11

## 2021-11-11 RX ORDER — ALIROCUMAB 75 MG/ML
75 INJECTION, SOLUTION SUBCUTANEOUS
Qty: 6 ML | Refills: 3 | Status: SHIPPED | OUTPATIENT
Start: 2021-11-11 | End: 2022-10-14 | Stop reason: SDUPTHER

## 2021-11-11 RX ORDER — EPINEPHRINE 0.3 MG/.3ML
0.3 INJECTION SUBCUTANEOUS ONCE AS NEEDED
Status: DISCONTINUED | OUTPATIENT
Start: 2021-11-11 | End: 2021-11-11 | Stop reason: HOSPADM

## 2021-11-11 RX ADMIN — BORTEZOMIB 2.5 MG: 3.5 INJECTION, POWDER, LYOPHILIZED, FOR SOLUTION INTRAVENOUS; SUBCUTANEOUS at 01:11

## 2021-11-11 RX ADMIN — DEXAMETHASONE 20 MG: 4 TABLET ORAL at 01:11

## 2021-11-12 ENCOUNTER — EXTERNAL HOME HEALTH (OUTPATIENT)
Dept: HOME HEALTH SERVICES | Facility: HOSPITAL | Age: 71
End: 2021-11-12
Payer: MEDICARE

## 2021-11-12 LAB
ALBUMIN SERPL ELPH-MCNC: 4.03 G/DL (ref 3.35–5.55)
ALPHA1 GLOB SERPL ELPH-MCNC: 0.29 G/DL (ref 0.17–0.41)
ALPHA2 GLOB SERPL ELPH-MCNC: 0.84 G/DL (ref 0.43–0.99)
B-GLOBULIN SERPL ELPH-MCNC: 0.7 G/DL (ref 0.5–1.1)
GAMMA GLOB SERPL ELPH-MCNC: 0.53 G/DL (ref 0.67–1.58)
INTERPRETATION SERPL IFE-IMP: NORMAL
KAPPA LC SER QL IA: 1.5 MG/DL (ref 0.33–1.94)
KAPPA LC/LAMBDA SER IA: 5.56 (ref 0.26–1.65)
LAMBDA LC SER QL IA: 0.27 MG/DL (ref 0.57–2.63)
PROT SERPL-MCNC: 6.4 G/DL (ref 6–8.4)

## 2021-11-13 LAB
PATHOLOGIST INTERPRETATION IFE: NORMAL
PATHOLOGIST INTERPRETATION SPE: NORMAL

## 2021-11-15 ENCOUNTER — TELEPHONE (OUTPATIENT)
Dept: INTERNAL MEDICINE | Facility: CLINIC | Age: 71
End: 2021-11-15

## 2021-11-15 ENCOUNTER — OFFICE VISIT (OUTPATIENT)
Dept: INTERNAL MEDICINE | Facility: CLINIC | Age: 71
End: 2021-11-15
Payer: MEDICARE

## 2021-11-15 ENCOUNTER — LAB VISIT (OUTPATIENT)
Dept: LAB | Facility: HOSPITAL | Age: 71
End: 2021-11-15
Attending: INTERNAL MEDICINE
Payer: MEDICARE

## 2021-11-15 VITALS
OXYGEN SATURATION: 96 % | HEART RATE: 80 BPM | DIASTOLIC BLOOD PRESSURE: 88 MMHG | WEIGHT: 176.56 LBS | SYSTOLIC BLOOD PRESSURE: 124 MMHG | BODY MASS INDEX: 31.29 KG/M2 | HEIGHT: 63 IN

## 2021-11-15 DIAGNOSIS — I69.30 HISTORY OF CVA WITH RESIDUAL DEFICIT: ICD-10-CM

## 2021-11-15 DIAGNOSIS — K31.9 GASTROPATHY: ICD-10-CM

## 2021-11-15 DIAGNOSIS — C90.01 MULTIPLE MYELOMA IN REMISSION: ICD-10-CM

## 2021-11-15 DIAGNOSIS — J44.9 CHRONIC OBSTRUCTIVE PULMONARY DISEASE, UNSPECIFIED COPD TYPE: ICD-10-CM

## 2021-11-15 DIAGNOSIS — E78.00 PURE HYPERCHOLESTEROLEMIA: ICD-10-CM

## 2021-11-15 DIAGNOSIS — C90.02 MULTIPLE MYELOMA IN RELAPSE: ICD-10-CM

## 2021-11-15 DIAGNOSIS — Z78.9 STATIN INTOLERANCE: Primary | ICD-10-CM

## 2021-11-15 DIAGNOSIS — D69.6 THROMBOCYTOPENIA, UNSPECIFIED: ICD-10-CM

## 2021-11-15 DIAGNOSIS — E11.51 TYPE 2 DIABETES MELLITUS WITH DIABETIC PERIPHERAL ANGIOPATHY WITHOUT GANGRENE, WITH LONG-TERM CURRENT USE OF INSULIN: ICD-10-CM

## 2021-11-15 DIAGNOSIS — Z12.31 OTHER SCREENING MAMMOGRAM: ICD-10-CM

## 2021-11-15 DIAGNOSIS — C90.00 MULTIPLE MYELOMA, REMISSION STATUS UNSPECIFIED: ICD-10-CM

## 2021-11-15 DIAGNOSIS — Z79.4 TYPE 2 DIABETES MELLITUS WITH DIABETIC PERIPHERAL ANGIOPATHY WITHOUT GANGRENE, WITH LONG-TERM CURRENT USE OF INSULIN: ICD-10-CM

## 2021-11-15 DIAGNOSIS — G62.0 DRUG-INDUCED POLYNEUROPATHY: ICD-10-CM

## 2021-11-15 LAB
ALBUMIN SERPL BCP-MCNC: 3.9 G/DL (ref 3.5–5.2)
ALP SERPL-CCNC: 75 U/L (ref 55–135)
ALT SERPL W/O P-5'-P-CCNC: 15 U/L (ref 10–44)
ANION GAP SERPL CALC-SCNC: 11 MMOL/L (ref 8–16)
AST SERPL-CCNC: 11 U/L (ref 10–40)
BASOPHILS # BLD AUTO: 0.01 K/UL (ref 0–0.2)
BASOPHILS NFR BLD: 0.4 % (ref 0–1.9)
BILIRUB SERPL-MCNC: 0.4 MG/DL (ref 0.1–1)
BUN SERPL-MCNC: 7 MG/DL (ref 8–23)
CALCIUM SERPL-MCNC: 9.2 MG/DL (ref 8.7–10.5)
CHLORIDE SERPL-SCNC: 99 MMOL/L (ref 95–110)
CHOLEST SERPL-MCNC: 198 MG/DL (ref 120–199)
CHOLEST/HDLC SERPL: 2.9 {RATIO} (ref 2–5)
CO2 SERPL-SCNC: 28 MMOL/L (ref 23–29)
CREAT SERPL-MCNC: 1.2 MG/DL (ref 0.5–1.4)
DIFFERENTIAL METHOD: ABNORMAL
EOSINOPHIL # BLD AUTO: 0 K/UL (ref 0–0.5)
EOSINOPHIL NFR BLD: 0.7 % (ref 0–8)
ERYTHROCYTE [DISTWIDTH] IN BLOOD BY AUTOMATED COUNT: 17.6 % (ref 11.5–14.5)
EST. GFR  (AFRICAN AMERICAN): 52.2 ML/MIN/1.73 M^2
EST. GFR  (NON AFRICAN AMERICAN): 45.3 ML/MIN/1.73 M^2
ESTIMATED AVG GLUCOSE: 312 MG/DL (ref 68–131)
GLUCOSE SERPL-MCNC: 490 MG/DL (ref 70–110)
HBA1C MFR BLD: 12.5 % (ref 4–5.6)
HCT VFR BLD AUTO: 34.5 % (ref 37–48.5)
HDLC SERPL-MCNC: 69 MG/DL (ref 40–75)
HDLC SERPL: 34.8 % (ref 20–50)
HGB BLD-MCNC: 10.7 G/DL (ref 12–16)
IGA SERPL-MCNC: 31 MG/DL (ref 40–350)
IGG SERPL-MCNC: 549 MG/DL (ref 650–1600)
IGM SERPL-MCNC: 14 MG/DL (ref 50–300)
IMM GRANULOCYTES # BLD AUTO: 0.01 K/UL (ref 0–0.04)
IMM GRANULOCYTES NFR BLD AUTO: 0.4 % (ref 0–0.5)
LDLC SERPL CALC-MCNC: 82 MG/DL (ref 63–159)
LYMPHOCYTES # BLD AUTO: 0.5 K/UL (ref 1–4.8)
LYMPHOCYTES NFR BLD: 17.2 % (ref 18–48)
MAGNESIUM SERPL-MCNC: 1.9 MG/DL (ref 1.6–2.6)
MCH RBC QN AUTO: 26.7 PG (ref 27–31)
MCHC RBC AUTO-ENTMCNC: 31 G/DL (ref 32–36)
MCV RBC AUTO: 86 FL (ref 82–98)
MONOCYTES # BLD AUTO: 0.3 K/UL (ref 0.3–1)
MONOCYTES NFR BLD: 11.7 % (ref 4–15)
NEUTROPHILS # BLD AUTO: 1.9 K/UL (ref 1.8–7.7)
NEUTROPHILS NFR BLD: 69.6 % (ref 38–73)
NONHDLC SERPL-MCNC: 129 MG/DL
NRBC BLD-RTO: 0 /100 WBC
PHOSPHATE SERPL-MCNC: 2.5 MG/DL (ref 2.7–4.5)
PLATELET # BLD AUTO: 103 K/UL (ref 150–450)
PMV BLD AUTO: ABNORMAL FL (ref 9.2–12.9)
POTASSIUM SERPL-SCNC: 4.2 MMOL/L (ref 3.5–5.1)
PROT SERPL-MCNC: 6.7 G/DL (ref 6–8.4)
RBC # BLD AUTO: 4.01 M/UL (ref 4–5.4)
SODIUM SERPL-SCNC: 138 MMOL/L (ref 136–145)
TRIGL SERPL-MCNC: 235 MG/DL (ref 30–150)
WBC # BLD AUTO: 2.74 K/UL (ref 3.9–12.7)

## 2021-11-15 PROCEDURE — 86334 IMMUNOFIX E-PHORESIS SERUM: CPT | Mod: HCNC | Performed by: INTERNAL MEDICINE

## 2021-11-15 PROCEDURE — 36415 COLL VENOUS BLD VENIPUNCTURE: CPT | Mod: HCNC | Performed by: INTERNAL MEDICINE

## 2021-11-15 PROCEDURE — 3079F DIAST BP 80-89 MM HG: CPT | Mod: HCNC,BMT,CPTII,S$GLB | Performed by: INTERNAL MEDICINE

## 2021-11-15 PROCEDURE — 83520 IMMUNOASSAY QUANT NOS NONAB: CPT | Mod: HCNC | Performed by: INTERNAL MEDICINE

## 2021-11-15 PROCEDURE — 3046F HEMOGLOBIN A1C LEVEL >9.0%: CPT | Mod: HCNC,BMT,CPTII,S$GLB | Performed by: INTERNAL MEDICINE

## 2021-11-15 PROCEDURE — 80061 LIPID PANEL: CPT | Mod: HCNC | Performed by: INTERNAL MEDICINE

## 2021-11-15 PROCEDURE — 80053 COMPREHEN METABOLIC PANEL: CPT | Mod: HCNC | Performed by: INTERNAL MEDICINE

## 2021-11-15 PROCEDURE — 86334 PATHOLOGIST INTERPRETATION IFE: ICD-10-PCS | Mod: 26,HCNC,BMT, | Performed by: PATHOLOGY

## 2021-11-15 PROCEDURE — 3072F PR LOW RISK FOR RETINOPATHY: ICD-10-PCS | Mod: HCNC,BMT,CPTII,S$GLB | Performed by: INTERNAL MEDICINE

## 2021-11-15 PROCEDURE — 3046F PR MOST RECENT HEMOGLOBIN A1C LEVEL > 9.0%: ICD-10-PCS | Mod: HCNC,BMT,CPTII,S$GLB | Performed by: INTERNAL MEDICINE

## 2021-11-15 PROCEDURE — 1159F PR MEDICATION LIST DOCUMENTED IN MEDICAL RECORD: ICD-10-PCS | Mod: HCNC,BMT,CPTII,S$GLB | Performed by: INTERNAL MEDICINE

## 2021-11-15 PROCEDURE — 1101F PT FALLS ASSESS-DOCD LE1/YR: CPT | Mod: HCNC,BMT,CPTII,S$GLB | Performed by: INTERNAL MEDICINE

## 2021-11-15 PROCEDURE — 84165 PROTEIN E-PHORESIS SERUM: CPT | Mod: 26,HCNC,BMT, | Performed by: PATHOLOGY

## 2021-11-15 PROCEDURE — 1126F PR PAIN SEVERITY QUANTIFIED, NO PAIN PRESENT: ICD-10-PCS | Mod: HCNC,BMT,CPTII,S$GLB | Performed by: INTERNAL MEDICINE

## 2021-11-15 PROCEDURE — 3074F SYST BP LT 130 MM HG: CPT | Mod: HCNC,BMT,CPTII,S$GLB | Performed by: INTERNAL MEDICINE

## 2021-11-15 PROCEDURE — 99999 PR PBB SHADOW E&M-EST. PATIENT-LVL V: CPT | Mod: PBBFAC,HCNC,BMT, | Performed by: INTERNAL MEDICINE

## 2021-11-15 PROCEDURE — 1126F AMNT PAIN NOTED NONE PRSNT: CPT | Mod: HCNC,BMT,CPTII,S$GLB | Performed by: INTERNAL MEDICINE

## 2021-11-15 PROCEDURE — 82784 ASSAY IGA/IGD/IGG/IGM EACH: CPT | Mod: HCNC | Performed by: INTERNAL MEDICINE

## 2021-11-15 PROCEDURE — 3008F PR BODY MASS INDEX (BMI) DOCUMENTED: ICD-10-PCS | Mod: HCNC,BMT,CPTII,S$GLB | Performed by: INTERNAL MEDICINE

## 2021-11-15 PROCEDURE — 84165 PATHOLOGIST INTERPRETATION SPE: ICD-10-PCS | Mod: 26,HCNC,BMT, | Performed by: PATHOLOGY

## 2021-11-15 PROCEDURE — 84100 ASSAY OF PHOSPHORUS: CPT | Mod: HCNC | Performed by: INTERNAL MEDICINE

## 2021-11-15 PROCEDURE — 99999 PR PBB SHADOW E&M-EST. PATIENT-LVL V: ICD-10-PCS | Mod: PBBFAC,HCNC,BMT, | Performed by: INTERNAL MEDICINE

## 2021-11-15 PROCEDURE — 99214 OFFICE O/P EST MOD 30 MIN: CPT | Mod: HCNC,BMT,S$GLB, | Performed by: INTERNAL MEDICINE

## 2021-11-15 PROCEDURE — 83036 HEMOGLOBIN GLYCOSYLATED A1C: CPT | Mod: HCNC | Performed by: INTERNAL MEDICINE

## 2021-11-15 PROCEDURE — 1159F MED LIST DOCD IN RCRD: CPT | Mod: HCNC,BMT,CPTII,S$GLB | Performed by: INTERNAL MEDICINE

## 2021-11-15 PROCEDURE — 3079F PR MOST RECENT DIASTOLIC BLOOD PRESSURE 80-89 MM HG: ICD-10-PCS | Mod: HCNC,BMT,CPTII,S$GLB | Performed by: INTERNAL MEDICINE

## 2021-11-15 PROCEDURE — 3008F BODY MASS INDEX DOCD: CPT | Mod: HCNC,BMT,CPTII,S$GLB | Performed by: INTERNAL MEDICINE

## 2021-11-15 PROCEDURE — 3074F PR MOST RECENT SYSTOLIC BLOOD PRESSURE < 130 MM HG: ICD-10-PCS | Mod: HCNC,BMT,CPTII,S$GLB | Performed by: INTERNAL MEDICINE

## 2021-11-15 PROCEDURE — 83735 ASSAY OF MAGNESIUM: CPT | Mod: HCNC | Performed by: INTERNAL MEDICINE

## 2021-11-15 PROCEDURE — 84165 PROTEIN E-PHORESIS SERUM: CPT | Mod: HCNC | Performed by: NURSE PRACTITIONER

## 2021-11-15 PROCEDURE — 85025 COMPLETE CBC W/AUTO DIFF WBC: CPT | Mod: HCNC | Performed by: INTERNAL MEDICINE

## 2021-11-15 PROCEDURE — 3288F FALL RISK ASSESSMENT DOCD: CPT | Mod: HCNC,BMT,CPTII,S$GLB | Performed by: INTERNAL MEDICINE

## 2021-11-15 PROCEDURE — 3072F LOW RISK FOR RETINOPATHY: CPT | Mod: HCNC,BMT,CPTII,S$GLB | Performed by: INTERNAL MEDICINE

## 2021-11-15 PROCEDURE — 99214 PR OFFICE/OUTPT VISIT, EST, LEVL IV, 30-39 MIN: ICD-10-PCS | Mod: HCNC,BMT,S$GLB, | Performed by: INTERNAL MEDICINE

## 2021-11-15 PROCEDURE — 1101F PR PT FALLS ASSESS DOC 0-1 FALLS W/OUT INJ PAST YR: ICD-10-PCS | Mod: HCNC,BMT,CPTII,S$GLB | Performed by: INTERNAL MEDICINE

## 2021-11-15 PROCEDURE — 3288F PR FALLS RISK ASSESSMENT DOCUMENTED: ICD-10-PCS | Mod: HCNC,BMT,CPTII,S$GLB | Performed by: INTERNAL MEDICINE

## 2021-11-15 PROCEDURE — 86334 IMMUNOFIX E-PHORESIS SERUM: CPT | Mod: 26,HCNC,BMT, | Performed by: PATHOLOGY

## 2021-11-16 ENCOUNTER — PES CALL (OUTPATIENT)
Dept: ADMINISTRATIVE | Facility: CLINIC | Age: 71
End: 2021-11-16
Payer: MEDICARE

## 2021-11-16 LAB
ALBUMIN SERPL ELPH-MCNC: 4 G/DL (ref 3.35–5.55)
ALBUMIN SERPL ELPH-MCNC: 4 G/DL (ref 3.35–5.55)
ALPHA1 GLOB SERPL ELPH-MCNC: 0.29 G/DL (ref 0.17–0.41)
ALPHA1 GLOB SERPL ELPH-MCNC: 0.29 G/DL (ref 0.17–0.41)
ALPHA2 GLOB SERPL ELPH-MCNC: 0.89 G/DL (ref 0.43–0.99)
ALPHA2 GLOB SERPL ELPH-MCNC: 0.89 G/DL (ref 0.43–0.99)
B-GLOBULIN SERPL ELPH-MCNC: 0.71 G/DL (ref 0.5–1.1)
B-GLOBULIN SERPL ELPH-MCNC: 0.71 G/DL (ref 0.5–1.1)
GAMMA GLOB SERPL ELPH-MCNC: 0.51 G/DL (ref 0.67–1.58)
GAMMA GLOB SERPL ELPH-MCNC: 0.51 G/DL (ref 0.67–1.58)
INTERPRETATION SERPL IFE-IMP: NORMAL
KAPPA LC SER QL IA: 1.2 MG/DL (ref 0.33–1.94)
KAPPA LC/LAMBDA SER IA: 4.44 (ref 0.26–1.65)
LAMBDA LC SER QL IA: 0.27 MG/DL (ref 0.57–2.63)
PROT SERPL-MCNC: 6.4 G/DL (ref 6–8.4)
PROT SERPL-MCNC: 6.4 G/DL (ref 6–8.4)

## 2021-11-17 ENCOUNTER — SPECIALTY PHARMACY (OUTPATIENT)
Dept: PHARMACY | Facility: CLINIC | Age: 71
End: 2021-11-17
Payer: MEDICARE

## 2021-11-17 ENCOUNTER — TELEPHONE (OUTPATIENT)
Dept: INTERNAL MEDICINE | Facility: CLINIC | Age: 71
End: 2021-11-17
Payer: MEDICARE

## 2021-11-17 DIAGNOSIS — Z79.4 TYPE 2 DIABETES MELLITUS WITH DIABETIC PERIPHERAL ANGIOPATHY WITHOUT GANGRENE, WITH LONG-TERM CURRENT USE OF INSULIN: Primary | ICD-10-CM

## 2021-11-17 DIAGNOSIS — E11.51 TYPE 2 DIABETES MELLITUS WITH DIABETIC PERIPHERAL ANGIOPATHY WITHOUT GANGRENE, WITH LONG-TERM CURRENT USE OF INSULIN: Primary | ICD-10-CM

## 2021-11-17 LAB
PATHOLOGIST INTERPRETATION IFE: NORMAL
PATHOLOGIST INTERPRETATION SPE: NORMAL
PATHOLOGIST INTERPRETATION SPE: NORMAL

## 2021-11-17 RX ORDER — METFORMIN HYDROCHLORIDE 500 MG/1
500 TABLET ORAL 2 TIMES DAILY WITH MEALS
Qty: 180 TABLET | Refills: 3 | Status: SHIPPED | OUTPATIENT
Start: 2021-11-17 | End: 2021-12-03 | Stop reason: SDUPTHER

## 2021-11-18 ENCOUNTER — INFUSION (OUTPATIENT)
Dept: INFUSION THERAPY | Facility: HOSPITAL | Age: 71
End: 2021-11-18
Payer: MEDICARE

## 2021-11-18 VITALS
TEMPERATURE: 99 F | OXYGEN SATURATION: 95 % | SYSTOLIC BLOOD PRESSURE: 140 MMHG | HEART RATE: 77 BPM | RESPIRATION RATE: 16 BRPM | DIASTOLIC BLOOD PRESSURE: 79 MMHG

## 2021-11-18 DIAGNOSIS — C90.01 MULTIPLE MYELOMA IN REMISSION: Primary | ICD-10-CM

## 2021-11-18 DIAGNOSIS — C90.02 MULTIPLE MYELOMA IN RELAPSE: ICD-10-CM

## 2021-11-18 PROCEDURE — 63600175 PHARM REV CODE 636 W HCPCS: Mod: TB,HCNC | Performed by: INTERNAL MEDICINE

## 2021-11-18 PROCEDURE — 96401 CHEMO ANTI-NEOPL SQ/IM: CPT | Mod: HCNC

## 2021-11-18 PROCEDURE — 25000003 PHARM REV CODE 250: Mod: HCNC | Performed by: INTERNAL MEDICINE

## 2021-11-18 RX ORDER — BORTEZOMIB 3.5 MG/1
1.3 INJECTION, POWDER, LYOPHILIZED, FOR SOLUTION INTRAVENOUS; SUBCUTANEOUS
Status: CANCELLED | OUTPATIENT
Start: 2021-11-25

## 2021-11-18 RX ORDER — BORTEZOMIB 3.5 MG/1
1.3 INJECTION, POWDER, LYOPHILIZED, FOR SOLUTION INTRAVENOUS; SUBCUTANEOUS
Status: COMPLETED | OUTPATIENT
Start: 2021-11-18 | End: 2021-11-18

## 2021-11-18 RX ORDER — DEXAMETHASONE 4 MG/1
20 TABLET ORAL
Status: CANCELLED | OUTPATIENT
Start: 2021-12-02

## 2021-11-18 RX ORDER — BORTEZOMIB 3.5 MG/1
1.3 INJECTION, POWDER, LYOPHILIZED, FOR SOLUTION INTRAVENOUS; SUBCUTANEOUS
Status: CANCELLED | OUTPATIENT
Start: 2021-12-16

## 2021-11-18 RX ORDER — ACETAMINOPHEN 325 MG/1
650 TABLET ORAL
Status: COMPLETED | OUTPATIENT
Start: 2021-11-18 | End: 2021-11-18

## 2021-11-18 RX ORDER — EPINEPHRINE 0.3 MG/.3ML
0.3 INJECTION SUBCUTANEOUS ONCE AS NEEDED
Status: CANCELLED | OUTPATIENT
Start: 2021-11-18

## 2021-11-18 RX ORDER — BORTEZOMIB 3.5 MG/1
1.3 INJECTION, POWDER, LYOPHILIZED, FOR SOLUTION INTRAVENOUS; SUBCUTANEOUS
Status: CANCELLED | OUTPATIENT
Start: 2021-12-02

## 2021-11-18 RX ORDER — DIPHENHYDRAMINE HYDROCHLORIDE 50 MG/ML
50 INJECTION INTRAMUSCULAR; INTRAVENOUS ONCE AS NEEDED
Status: CANCELLED | OUTPATIENT
Start: 2021-12-02

## 2021-11-18 RX ORDER — EPINEPHRINE 0.3 MG/.3ML
0.3 INJECTION SUBCUTANEOUS ONCE AS NEEDED
Status: CANCELLED | OUTPATIENT
Start: 2021-12-02

## 2021-11-18 RX ORDER — BORTEZOMIB 3.5 MG/1
1.3 INJECTION, POWDER, LYOPHILIZED, FOR SOLUTION INTRAVENOUS; SUBCUTANEOUS
Status: CANCELLED | OUTPATIENT
Start: 2021-11-18

## 2021-11-18 RX ORDER — EPINEPHRINE 0.3 MG/.3ML
0.3 INJECTION SUBCUTANEOUS ONCE AS NEEDED
Status: CANCELLED | OUTPATIENT
Start: 2021-11-25

## 2021-11-18 RX ORDER — DIPHENHYDRAMINE HYDROCHLORIDE 50 MG/ML
50 INJECTION INTRAMUSCULAR; INTRAVENOUS ONCE AS NEEDED
Status: CANCELLED | OUTPATIENT
Start: 2021-12-16

## 2021-11-18 RX ORDER — DIPHENHYDRAMINE HCL 25 MG
25 CAPSULE ORAL
Status: CANCELLED | OUTPATIENT
Start: 2021-11-18

## 2021-11-18 RX ORDER — DEXAMETHASONE 4 MG/1
20 TABLET ORAL
Status: CANCELLED | OUTPATIENT
Start: 2021-11-18

## 2021-11-18 RX ORDER — DIPHENHYDRAMINE HCL 25 MG
25 CAPSULE ORAL
Status: COMPLETED | OUTPATIENT
Start: 2021-11-18 | End: 2021-11-18

## 2021-11-18 RX ORDER — DEXAMETHASONE 4 MG/1
20 TABLET ORAL
Status: CANCELLED | OUTPATIENT
Start: 2021-12-09

## 2021-11-18 RX ORDER — DIPHENHYDRAMINE HYDROCHLORIDE 50 MG/ML
50 INJECTION INTRAMUSCULAR; INTRAVENOUS ONCE AS NEEDED
Status: CANCELLED | OUTPATIENT
Start: 2021-11-25

## 2021-11-18 RX ORDER — DIPHENHYDRAMINE HYDROCHLORIDE 50 MG/ML
50 INJECTION INTRAMUSCULAR; INTRAVENOUS ONCE AS NEEDED
Status: CANCELLED | OUTPATIENT
Start: 2021-11-18

## 2021-11-18 RX ORDER — DIPHENHYDRAMINE HYDROCHLORIDE 50 MG/ML
50 INJECTION INTRAMUSCULAR; INTRAVENOUS ONCE AS NEEDED
Status: DISCONTINUED | OUTPATIENT
Start: 2021-11-18 | End: 2021-11-18 | Stop reason: HOSPADM

## 2021-11-18 RX ORDER — ACETAMINOPHEN 325 MG/1
650 TABLET ORAL
Status: CANCELLED | OUTPATIENT
Start: 2021-11-18

## 2021-11-18 RX ORDER — EPINEPHRINE 0.3 MG/.3ML
0.3 INJECTION SUBCUTANEOUS ONCE AS NEEDED
Status: CANCELLED | OUTPATIENT
Start: 2021-12-16

## 2021-11-18 RX ORDER — DEXAMETHASONE 4 MG/1
20 TABLET ORAL
Status: CANCELLED | OUTPATIENT
Start: 2021-11-25

## 2021-11-18 RX ORDER — EPINEPHRINE 0.3 MG/.3ML
0.3 INJECTION SUBCUTANEOUS ONCE AS NEEDED
Status: DISCONTINUED | OUTPATIENT
Start: 2021-11-18 | End: 2021-11-18 | Stop reason: HOSPADM

## 2021-11-18 RX ADMIN — DIPHENHYDRAMINE HYDROCHLORIDE 25 MG: 25 CAPSULE ORAL at 12:11

## 2021-11-18 RX ADMIN — BORTEZOMIB 2.5 MG: 3.5 INJECTION, POWDER, LYOPHILIZED, FOR SOLUTION INTRAVENOUS; SUBCUTANEOUS at 02:11

## 2021-11-18 RX ADMIN — ACETAMINOPHEN 650 MG: 325 TABLET ORAL at 12:11

## 2021-11-18 RX ADMIN — DARATUMUMAB AND HYALURONIDASE-FIHJ (HUMAN RECOMBINANT) 1800 MG: 1800; 30000 INJECTION SUBCUTANEOUS at 02:11

## 2021-11-23 ENCOUNTER — TELEPHONE (OUTPATIENT)
Dept: CARDIOLOGY | Facility: CLINIC | Age: 71
End: 2021-11-23
Payer: MEDICARE

## 2021-11-26 ENCOUNTER — INFUSION (OUTPATIENT)
Dept: INFUSION THERAPY | Facility: HOSPITAL | Age: 71
End: 2021-11-26
Payer: MEDICARE

## 2021-11-26 VITALS — SYSTOLIC BLOOD PRESSURE: 125 MMHG | DIASTOLIC BLOOD PRESSURE: 82 MMHG | HEART RATE: 81 BPM | RESPIRATION RATE: 18 BRPM

## 2021-11-26 DIAGNOSIS — C90.02 MULTIPLE MYELOMA IN RELAPSE: ICD-10-CM

## 2021-11-26 DIAGNOSIS — C90.01 MULTIPLE MYELOMA IN REMISSION: Primary | ICD-10-CM

## 2021-11-26 PROCEDURE — 96401 CHEMO ANTI-NEOPL SQ/IM: CPT | Mod: HCNC

## 2021-11-26 PROCEDURE — 63600175 PHARM REV CODE 636 W HCPCS: Mod: JW,JG,HCNC | Performed by: INTERNAL MEDICINE

## 2021-11-26 RX ORDER — BORTEZOMIB 3.5 MG/1
1.3 INJECTION, POWDER, LYOPHILIZED, FOR SOLUTION INTRAVENOUS; SUBCUTANEOUS
Status: COMPLETED | OUTPATIENT
Start: 2021-11-26 | End: 2021-11-26

## 2021-11-26 RX ADMIN — BORTEZOMIB 2.5 MG: 3.5 INJECTION, POWDER, LYOPHILIZED, FOR SOLUTION INTRAVENOUS; SUBCUTANEOUS at 11:11

## 2021-12-02 ENCOUNTER — INFUSION (OUTPATIENT)
Dept: INFUSION THERAPY | Facility: HOSPITAL | Age: 71
End: 2021-12-02
Payer: MEDICARE

## 2021-12-02 ENCOUNTER — PATIENT OUTREACH (OUTPATIENT)
Dept: ADMINISTRATIVE | Facility: OTHER | Age: 71
End: 2021-12-02
Payer: MEDICARE

## 2021-12-02 VITALS
SYSTOLIC BLOOD PRESSURE: 138 MMHG | RESPIRATION RATE: 18 BRPM | DIASTOLIC BLOOD PRESSURE: 86 MMHG | HEART RATE: 92 BPM | TEMPERATURE: 99 F

## 2021-12-02 DIAGNOSIS — C90.02 MULTIPLE MYELOMA IN RELAPSE: ICD-10-CM

## 2021-12-02 DIAGNOSIS — C90.01 MULTIPLE MYELOMA IN REMISSION: Primary | ICD-10-CM

## 2021-12-02 PROCEDURE — 63600175 PHARM REV CODE 636 W HCPCS: Mod: JG,HCNC | Performed by: INTERNAL MEDICINE

## 2021-12-02 PROCEDURE — 96401 CHEMO ANTI-NEOPL SQ/IM: CPT | Mod: HCNC

## 2021-12-02 RX ORDER — DIPHENHYDRAMINE HYDROCHLORIDE 50 MG/ML
50 INJECTION INTRAMUSCULAR; INTRAVENOUS ONCE AS NEEDED
Status: DISCONTINUED | OUTPATIENT
Start: 2021-12-02 | End: 2021-12-02 | Stop reason: HOSPADM

## 2021-12-02 RX ORDER — EPINEPHRINE 0.3 MG/.3ML
0.3 INJECTION SUBCUTANEOUS ONCE AS NEEDED
Status: DISCONTINUED | OUTPATIENT
Start: 2021-12-02 | End: 2021-12-02 | Stop reason: HOSPADM

## 2021-12-02 RX ORDER — BORTEZOMIB 3.5 MG/1
1.3 INJECTION, POWDER, LYOPHILIZED, FOR SOLUTION INTRAVENOUS; SUBCUTANEOUS
Status: COMPLETED | OUTPATIENT
Start: 2021-12-02 | End: 2021-12-02

## 2021-12-02 RX ADMIN — BORTEZOMIB 2.5 MG: 3.5 INJECTION, POWDER, LYOPHILIZED, FOR SOLUTION INTRAVENOUS; SUBCUTANEOUS at 01:12

## 2021-12-03 ENCOUNTER — OFFICE VISIT (OUTPATIENT)
Dept: CARDIOLOGY | Facility: CLINIC | Age: 71
End: 2021-12-03
Payer: MEDICARE

## 2021-12-03 VITALS
HEART RATE: 89 BPM | SYSTOLIC BLOOD PRESSURE: 134 MMHG | DIASTOLIC BLOOD PRESSURE: 61 MMHG | HEIGHT: 63 IN | OXYGEN SATURATION: 95 % | WEIGHT: 170.63 LBS | BODY MASS INDEX: 30.23 KG/M2

## 2021-12-03 DIAGNOSIS — I49.3 PVC (PREMATURE VENTRICULAR CONTRACTION): ICD-10-CM

## 2021-12-03 DIAGNOSIS — Z79.4 TYPE 2 DIABETES MELLITUS WITH DIABETIC PERIPHERAL ANGIOPATHY WITHOUT GANGRENE, WITH LONG-TERM CURRENT USE OF INSULIN: ICD-10-CM

## 2021-12-03 DIAGNOSIS — E78.00 PURE HYPERCHOLESTEROLEMIA: ICD-10-CM

## 2021-12-03 DIAGNOSIS — E11.51 TYPE 2 DIABETES MELLITUS WITH DIABETIC PERIPHERAL ANGIOPATHY WITHOUT GANGRENE, WITH LONG-TERM CURRENT USE OF INSULIN: ICD-10-CM

## 2021-12-03 DIAGNOSIS — Z78.9 STATIN INTOLERANCE: ICD-10-CM

## 2021-12-03 DIAGNOSIS — I27.9 PULMONARY HEART DISEASE: ICD-10-CM

## 2021-12-03 DIAGNOSIS — I70.0 AORTIC ATHEROSCLEROSIS: ICD-10-CM

## 2021-12-03 DIAGNOSIS — I73.9 PAD (PERIPHERAL ARTERY DISEASE): Primary | ICD-10-CM

## 2021-12-03 DIAGNOSIS — I71.20 THORACIC AORTIC ANEURYSM WITHOUT RUPTURE: ICD-10-CM

## 2021-12-03 PROCEDURE — 3072F PR LOW RISK FOR RETINOPATHY: ICD-10-PCS | Mod: HCNC,BMT,CPTII,S$GLB | Performed by: INTERNAL MEDICINE

## 2021-12-03 PROCEDURE — 99499 RISK ADDL DX/OHS AUDIT: ICD-10-PCS | Mod: HCNC,BMT,S$GLB, | Performed by: INTERNAL MEDICINE

## 2021-12-03 PROCEDURE — 99499 UNLISTED E&M SERVICE: CPT | Mod: HCNC,BMT,S$GLB, | Performed by: INTERNAL MEDICINE

## 2021-12-03 PROCEDURE — 99214 PR OFFICE/OUTPT VISIT, EST, LEVL IV, 30-39 MIN: ICD-10-PCS | Mod: HCNC,BMT,S$GLB, | Performed by: INTERNAL MEDICINE

## 2021-12-03 PROCEDURE — 3072F LOW RISK FOR RETINOPATHY: CPT | Mod: HCNC,BMT,CPTII,S$GLB | Performed by: INTERNAL MEDICINE

## 2021-12-03 PROCEDURE — 99999 PR PBB SHADOW E&M-EST. PATIENT-LVL V: CPT | Mod: PBBFAC,HCNC,BMT, | Performed by: INTERNAL MEDICINE

## 2021-12-03 PROCEDURE — 99214 OFFICE O/P EST MOD 30 MIN: CPT | Mod: HCNC,BMT,S$GLB, | Performed by: INTERNAL MEDICINE

## 2021-12-03 PROCEDURE — 99999 PR PBB SHADOW E&M-EST. PATIENT-LVL V: ICD-10-PCS | Mod: PBBFAC,HCNC,BMT, | Performed by: INTERNAL MEDICINE

## 2021-12-03 RX ORDER — METFORMIN HYDROCHLORIDE 1000 MG/1
1000 TABLET ORAL 2 TIMES DAILY WITH MEALS
Qty: 180 TABLET | Refills: 3 | Status: SHIPPED | OUTPATIENT
Start: 2021-12-03 | End: 2022-05-26 | Stop reason: SDUPTHER

## 2021-12-05 ENCOUNTER — HOSPITAL ENCOUNTER (INPATIENT)
Facility: HOSPITAL | Age: 71
LOS: 2 days | Discharge: HOME | DRG: 189 | End: 2021-12-07
Attending: EMERGENCY MEDICINE | Admitting: INTERNAL MEDICINE
Payer: MEDICARE

## 2021-12-05 DIAGNOSIS — E11.51 TYPE 2 DIABETES MELLITUS WITH DIABETIC PERIPHERAL ANGIOPATHY WITHOUT GANGRENE, WITH LONG-TERM CURRENT USE OF INSULIN: ICD-10-CM

## 2021-12-05 DIAGNOSIS — J18.9 COMMUNITY ACQUIRED PNEUMONIA OF RIGHT LOWER LOBE OF LUNG: ICD-10-CM

## 2021-12-05 DIAGNOSIS — I10 ESSENTIAL HYPERTENSION: ICD-10-CM

## 2021-12-05 DIAGNOSIS — I50.32 CHRONIC DIASTOLIC HEART FAILURE: ICD-10-CM

## 2021-12-05 DIAGNOSIS — R05.9 COUGH: ICD-10-CM

## 2021-12-05 DIAGNOSIS — J44.9 CHRONIC OBSTRUCTIVE PULMONARY DISEASE, UNSPECIFIED COPD TYPE: ICD-10-CM

## 2021-12-05 DIAGNOSIS — R07.9 CHEST PAIN: ICD-10-CM

## 2021-12-05 DIAGNOSIS — F33.42 RECURRENT MAJOR DEPRESSIVE DISORDER, IN FULL REMISSION: ICD-10-CM

## 2021-12-05 DIAGNOSIS — Z85.3 PERSONAL HISTORY OF MALIGNANT NEOPLASM OF BREAST: ICD-10-CM

## 2021-12-05 DIAGNOSIS — C90.01 MULTIPLE MYELOMA IN REMISSION: ICD-10-CM

## 2021-12-05 DIAGNOSIS — R06.02 SOB (SHORTNESS OF BREATH): ICD-10-CM

## 2021-12-05 DIAGNOSIS — Z94.84 HISTORY OF AUTO STEM CELL TRANSPLANT: ICD-10-CM

## 2021-12-05 DIAGNOSIS — B34.8 INFECTION DUE TO HUMAN METAPNEUMOVIRUS (HMPV): Primary | ICD-10-CM

## 2021-12-05 DIAGNOSIS — J96.01 ACUTE RESPIRATORY FAILURE WITH HYPOXIA: ICD-10-CM

## 2021-12-05 DIAGNOSIS — Z79.4 TYPE 2 DIABETES MELLITUS WITH DIABETIC PERIPHERAL ANGIOPATHY WITHOUT GANGRENE, WITH LONG-TERM CURRENT USE OF INSULIN: ICD-10-CM

## 2021-12-05 LAB
ADENOVIRUS: NOT DETECTED
ALBUMIN SERPL BCP-MCNC: 3.3 G/DL (ref 3.5–5.2)
ALLENS TEST: ABNORMAL
ALP SERPL-CCNC: 64 U/L (ref 55–135)
ALT SERPL W/O P-5'-P-CCNC: 12 U/L (ref 10–44)
ANION GAP SERPL CALC-SCNC: 15 MMOL/L (ref 8–16)
ANISOCYTOSIS BLD QL SMEAR: SLIGHT
AST SERPL-CCNC: 16 U/L (ref 10–40)
BASO STIPL BLD QL SMEAR: ABNORMAL
BASOPHILS # BLD AUTO: 0.01 K/UL (ref 0–0.2)
BASOPHILS NFR BLD: 0.1 % (ref 0–1.9)
BILIRUB SERPL-MCNC: 0.3 MG/DL (ref 0.1–1)
BILIRUB UR QL STRIP: NEGATIVE
BNP SERPL-MCNC: 55 PG/ML (ref 0–99)
BORDETELLA PARAPERTUSSIS (IS1001): NOT DETECTED
BORDETELLA PERTUSSIS (PTXP): NOT DETECTED
BUN SERPL-MCNC: 10 MG/DL (ref 8–23)
CALCIUM SERPL-MCNC: 9.4 MG/DL (ref 8.7–10.5)
CHLAMYDIA PNEUMONIAE: NOT DETECTED
CHLORIDE SERPL-SCNC: 103 MMOL/L (ref 95–110)
CLARITY UR REFRACT.AUTO: CLEAR
CO2 SERPL-SCNC: 21 MMOL/L (ref 23–29)
COLOR UR AUTO: ABNORMAL
CORONAVIRUS 229E, COMMON COLD VIRUS: NOT DETECTED
CORONAVIRUS HKU1, COMMON COLD VIRUS: NOT DETECTED
CORONAVIRUS NL63, COMMON COLD VIRUS: NOT DETECTED
CORONAVIRUS OC43, COMMON COLD VIRUS: NOT DETECTED
CREAT SERPL-MCNC: 0.8 MG/DL (ref 0.5–1.4)
CTP QC/QA: NORMAL
CTP QC/QA: YES
DACRYOCYTES BLD QL SMEAR: ABNORMAL
DELSYS: ABNORMAL
DIFFERENTIAL METHOD: ABNORMAL
EOSINOPHIL # BLD AUTO: 0 K/UL (ref 0–0.5)
EOSINOPHIL NFR BLD: 0.3 % (ref 0–8)
ERYTHROCYTE [DISTWIDTH] IN BLOOD BY AUTOMATED COUNT: 17.6 % (ref 11.5–14.5)
EST. GFR  (AFRICAN AMERICAN): >60 ML/MIN/1.73 M^2
EST. GFR  (NON AFRICAN AMERICAN): >60 ML/MIN/1.73 M^2
FLUBV RNA NPH QL NAA+NON-PROBE: NOT DETECTED
GLUCOSE SERPL-MCNC: 110 MG/DL (ref 70–110)
GLUCOSE UR QL STRIP: NEGATIVE
HCO3 UR-SCNC: 24.4 MMOL/L (ref 24–28)
HCT VFR BLD AUTO: 33.5 % (ref 37–48.5)
HCT VFR BLD CALC: 31 %PCV (ref 36–54)
HGB BLD-MCNC: 10.6 G/DL (ref 12–16)
HGB UR QL STRIP: ABNORMAL
HPIV1 RNA NPH QL NAA+NON-PROBE: NOT DETECTED
HPIV2 RNA NPH QL NAA+NON-PROBE: NOT DETECTED
HPIV3 RNA NPH QL NAA+NON-PROBE: NOT DETECTED
HPIV4 RNA NPH QL NAA+NON-PROBE: NOT DETECTED
HUMAN METAPNEUMOVIRUS: DETECTED
HYPOCHROMIA BLD QL SMEAR: ABNORMAL
IMM GRANULOCYTES # BLD AUTO: 0.03 K/UL (ref 0–0.04)
IMM GRANULOCYTES NFR BLD AUTO: 0.4 % (ref 0–0.5)
INFLUENZA A (SUBTYPES H1,H1-2009,H3): NOT DETECTED
KETONES UR QL STRIP: ABNORMAL
LACTATE SERPL-SCNC: 1.2 MMOL/L (ref 0.5–2.2)
LEUKOCYTE ESTERASE UR QL STRIP: NEGATIVE
LYMPHOCYTES # BLD AUTO: 0.4 K/UL (ref 1–4.8)
LYMPHOCYTES NFR BLD: 6 % (ref 18–48)
MAGNESIUM SERPL-MCNC: 1.1 MG/DL (ref 1.6–2.6)
MCH RBC QN AUTO: 26 PG (ref 27–31)
MCHC RBC AUTO-ENTMCNC: 31.6 G/DL (ref 32–36)
MCV RBC AUTO: 82 FL (ref 82–98)
MICROSCOPIC COMMENT: NORMAL
MONOCYTES # BLD AUTO: 1 K/UL (ref 0.3–1)
MONOCYTES NFR BLD: 15.5 % (ref 4–15)
MYCOPLASMA PNEUMONIAE: NOT DETECTED
NEUTROPHILS # BLD AUTO: 5.2 K/UL (ref 1.8–7.7)
NEUTROPHILS NFR BLD: 77.7 % (ref 38–73)
NITRITE UR QL STRIP: NEGATIVE
NRBC BLD-RTO: 0 /100 WBC
OVALOCYTES BLD QL SMEAR: ABNORMAL
PCO2 BLDA: 41.1 MMHG (ref 35–45)
PH SMN: 7.38 [PH] (ref 7.35–7.45)
PH UR STRIP: 5 [PH] (ref 5–8)
PLATELET # BLD AUTO: 111 K/UL (ref 150–450)
PLATELET BLD QL SMEAR: ABNORMAL
PMV BLD AUTO: ABNORMAL FL (ref 9.2–12.9)
PO2 BLDA: 26 MMHG (ref 40–60)
POC BE: -1 MMOL/L
POC IONIZED CALCIUM: 1.16 MMOL/L (ref 1.06–1.42)
POC MOLECULAR INFLUENZA A AGN: NEGATIVE
POC MOLECULAR INFLUENZA B AGN: NEGATIVE
POC SATURATED O2: 47 % (ref 95–100)
POC TCO2: 26 MMOL/L (ref 24–29)
POCT GLUCOSE: 114 MG/DL (ref 70–110)
POCT GLUCOSE: 174 MG/DL (ref 70–110)
POIKILOCYTOSIS BLD QL SMEAR: SLIGHT
POLYCHROMASIA BLD QL SMEAR: ABNORMAL
POTASSIUM BLD-SCNC: 3.6 MMOL/L (ref 3.5–5.1)
POTASSIUM SERPL-SCNC: 3.6 MMOL/L (ref 3.5–5.1)
PROCALCITONIN SERPL IA-MCNC: 0.11 NG/ML
PROT SERPL-MCNC: 7.3 G/DL (ref 6–8.4)
PROT UR QL STRIP: NEGATIVE
RBC # BLD AUTO: 4.08 M/UL (ref 4–5.4)
RBC #/AREA URNS AUTO: 1 /HPF (ref 0–4)
RESPIRATORY INFECTION PANEL SOURCE: ABNORMAL
RSV RNA NPH QL NAA+NON-PROBE: NOT DETECTED
RV+EV RNA NPH QL NAA+NON-PROBE: NOT DETECTED
SAMPLE: ABNORMAL
SARS-COV-2 RDRP RESP QL NAA+PROBE: NEGATIVE
SCHISTOCYTES BLD QL SMEAR: ABNORMAL
SITE: ABNORMAL
SODIUM BLD-SCNC: 140 MMOL/L (ref 136–145)
SODIUM SERPL-SCNC: 139 MMOL/L (ref 136–145)
SP GR UR STRIP: 1.01 (ref 1–1.03)
SPHEROCYTES BLD QL SMEAR: ABNORMAL
TROPONIN I SERPL DL<=0.01 NG/ML-MCNC: 0.01 NG/ML (ref 0–0.03)
URN SPEC COLLECT METH UR: ABNORMAL
WBC # BLD AUTO: 6.69 K/UL (ref 3.9–12.7)
WBC #/AREA URNS AUTO: 0 /HPF (ref 0–5)

## 2021-12-05 PROCEDURE — 93010 ELECTROCARDIOGRAM REPORT: CPT | Mod: HCNC,BMT,, | Performed by: INTERNAL MEDICINE

## 2021-12-05 PROCEDURE — 81001 URINALYSIS AUTO W/SCOPE: CPT | Mod: HCNC | Performed by: EMERGENCY MEDICINE

## 2021-12-05 PROCEDURE — 25000003 PHARM REV CODE 250: Mod: HCNC | Performed by: STUDENT IN AN ORGANIZED HEALTH CARE EDUCATION/TRAINING PROGRAM

## 2021-12-05 PROCEDURE — 96365 THER/PROPH/DIAG IV INF INIT: CPT | Mod: HCNC

## 2021-12-05 PROCEDURE — 82803 BLOOD GASES ANY COMBINATION: CPT | Mod: HCNC

## 2021-12-05 PROCEDURE — 63600175 PHARM REV CODE 636 W HCPCS: Mod: HCNC | Performed by: STUDENT IN AN ORGANIZED HEALTH CARE EDUCATION/TRAINING PROGRAM

## 2021-12-05 PROCEDURE — U0002 COVID-19 LAB TEST NON-CDC: HCPCS | Mod: HCNC | Performed by: EMERGENCY MEDICINE

## 2021-12-05 PROCEDURE — 11000001 HC ACUTE MED/SURG PRIVATE ROOM: Mod: HCNC

## 2021-12-05 PROCEDURE — 84145 PROCALCITONIN (PCT): CPT | Mod: HCNC | Performed by: EMERGENCY MEDICINE

## 2021-12-05 PROCEDURE — 99900035 HC TECH TIME PER 15 MIN (STAT): Mod: HCNC

## 2021-12-05 PROCEDURE — 63600175 PHARM REV CODE 636 W HCPCS: Mod: HCNC | Performed by: EMERGENCY MEDICINE

## 2021-12-05 PROCEDURE — 83880 ASSAY OF NATRIURETIC PEPTIDE: CPT | Mod: HCNC | Performed by: EMERGENCY MEDICINE

## 2021-12-05 PROCEDURE — 87205 SMEAR GRAM STAIN: CPT | Mod: HCNC | Performed by: STUDENT IN AN ORGANIZED HEALTH CARE EDUCATION/TRAINING PROGRAM

## 2021-12-05 PROCEDURE — 84484 ASSAY OF TROPONIN QUANT: CPT | Mod: HCNC | Performed by: EMERGENCY MEDICINE

## 2021-12-05 PROCEDURE — 96361 HYDRATE IV INFUSION ADD-ON: CPT | Mod: HCNC

## 2021-12-05 PROCEDURE — 99285 EMERGENCY DEPT VISIT HI MDM: CPT | Mod: CS,,, | Performed by: EMERGENCY MEDICINE

## 2021-12-05 PROCEDURE — 27000221 HC OXYGEN, UP TO 24 HOURS: Mod: HCNC

## 2021-12-05 PROCEDURE — 87070 CULTURE OTHR SPECIMN AEROBIC: CPT | Mod: HCNC | Performed by: STUDENT IN AN ORGANIZED HEALTH CARE EDUCATION/TRAINING PROGRAM

## 2021-12-05 PROCEDURE — 83735 ASSAY OF MAGNESIUM: CPT | Mod: HCNC | Performed by: EMERGENCY MEDICINE

## 2021-12-05 PROCEDURE — 99223 PR INITIAL HOSPITAL CARE,LEVL III: ICD-10-PCS | Mod: AI,HCNC,BMT, | Performed by: INTERNAL MEDICINE

## 2021-12-05 PROCEDURE — 80053 COMPREHEN METABOLIC PANEL: CPT | Mod: HCNC | Performed by: EMERGENCY MEDICINE

## 2021-12-05 PROCEDURE — 94640 AIRWAY INHALATION TREATMENT: CPT | Mod: HCNC

## 2021-12-05 PROCEDURE — 99285 EMERGENCY DEPT VISIT HI MDM: CPT | Mod: 25,HCNC

## 2021-12-05 PROCEDURE — 99285 PR EMERGENCY DEPT VISIT,LEVEL V: ICD-10-PCS | Mod: CS,,, | Performed by: EMERGENCY MEDICINE

## 2021-12-05 PROCEDURE — 93005 ELECTROCARDIOGRAM TRACING: CPT | Mod: HCNC

## 2021-12-05 PROCEDURE — 85025 COMPLETE CBC W/AUTO DIFF WBC: CPT | Mod: HCNC | Performed by: EMERGENCY MEDICINE

## 2021-12-05 PROCEDURE — 87040 BLOOD CULTURE FOR BACTERIA: CPT | Mod: HCNC | Performed by: EMERGENCY MEDICINE

## 2021-12-05 PROCEDURE — 94761 N-INVAS EAR/PLS OXIMETRY MLT: CPT | Mod: HCNC

## 2021-12-05 PROCEDURE — 87798 DETECT AGENT NOS DNA AMP: CPT | Mod: HCNC | Performed by: EMERGENCY MEDICINE

## 2021-12-05 PROCEDURE — 25000242 PHARM REV CODE 250 ALT 637 W/ HCPCS: Mod: HCNC | Performed by: STUDENT IN AN ORGANIZED HEALTH CARE EDUCATION/TRAINING PROGRAM

## 2021-12-05 PROCEDURE — 25000003 PHARM REV CODE 250: Mod: HCNC | Performed by: EMERGENCY MEDICINE

## 2021-12-05 PROCEDURE — 25000242 PHARM REV CODE 250 ALT 637 W/ HCPCS: Mod: HCNC | Performed by: EMERGENCY MEDICINE

## 2021-12-05 PROCEDURE — 99223 1ST HOSP IP/OBS HIGH 75: CPT | Mod: AI,HCNC,BMT, | Performed by: INTERNAL MEDICINE

## 2021-12-05 PROCEDURE — 83605 ASSAY OF LACTIC ACID: CPT | Mod: HCNC | Performed by: EMERGENCY MEDICINE

## 2021-12-05 PROCEDURE — 93010 EKG 12-LEAD: ICD-10-PCS | Mod: HCNC,BMT,, | Performed by: INTERNAL MEDICINE

## 2021-12-05 RX ORDER — AMLODIPINE BESYLATE 5 MG/1
5 TABLET ORAL DAILY
Status: DISCONTINUED | OUTPATIENT
Start: 2021-12-06 | End: 2021-12-07 | Stop reason: HOSPADM

## 2021-12-05 RX ORDER — GLUCAGON 1 MG
1 KIT INJECTION
Status: DISCONTINUED | OUTPATIENT
Start: 2021-12-05 | End: 2021-12-07 | Stop reason: HOSPADM

## 2021-12-05 RX ORDER — ACYCLOVIR 200 MG/1
400 CAPSULE ORAL 2 TIMES DAILY
Status: DISCONTINUED | OUTPATIENT
Start: 2021-12-05 | End: 2021-12-07 | Stop reason: HOSPADM

## 2021-12-05 RX ORDER — IBUPROFEN 200 MG
24 TABLET ORAL
Status: DISCONTINUED | OUTPATIENT
Start: 2021-12-05 | End: 2021-12-07 | Stop reason: HOSPADM

## 2021-12-05 RX ORDER — IBUPROFEN 200 MG
24 TABLET ORAL
Status: DISCONTINUED | OUTPATIENT
Start: 2021-12-05 | End: 2021-12-05

## 2021-12-05 RX ORDER — IBUPROFEN 200 MG
16 TABLET ORAL
Status: DISCONTINUED | OUTPATIENT
Start: 2021-12-05 | End: 2021-12-07 | Stop reason: HOSPADM

## 2021-12-05 RX ORDER — PANTOPRAZOLE SODIUM 40 MG/1
40 TABLET, DELAYED RELEASE ORAL DAILY
Refills: 3 | Status: DISCONTINUED | OUTPATIENT
Start: 2021-12-06 | End: 2021-12-07 | Stop reason: HOSPADM

## 2021-12-05 RX ORDER — IPRATROPIUM BROMIDE AND ALBUTEROL SULFATE 2.5; .5 MG/3ML; MG/3ML
3 SOLUTION RESPIRATORY (INHALATION)
Status: COMPLETED | OUTPATIENT
Start: 2021-12-05 | End: 2021-12-05

## 2021-12-05 RX ORDER — SERTRALINE HYDROCHLORIDE 50 MG/1
50 TABLET, FILM COATED ORAL DAILY
Status: DISCONTINUED | OUTPATIENT
Start: 2021-12-06 | End: 2021-12-07 | Stop reason: HOSPADM

## 2021-12-05 RX ORDER — ASPIRIN 81 MG/1
81 TABLET ORAL DAILY
Status: DISCONTINUED | OUTPATIENT
Start: 2021-12-06 | End: 2021-12-07 | Stop reason: HOSPADM

## 2021-12-05 RX ORDER — IPRATROPIUM BROMIDE AND ALBUTEROL SULFATE 2.5; .5 MG/3ML; MG/3ML
3 SOLUTION RESPIRATORY (INHALATION) EVERY 6 HOURS
Status: DISCONTINUED | OUTPATIENT
Start: 2021-12-05 | End: 2021-12-07 | Stop reason: HOSPADM

## 2021-12-05 RX ORDER — GLUCAGON 1 MG
1 KIT INJECTION
Status: DISCONTINUED | OUTPATIENT
Start: 2021-12-05 | End: 2021-12-05

## 2021-12-05 RX ORDER — NALOXONE HCL 0.4 MG/ML
0.02 VIAL (ML) INJECTION
Status: DISCONTINUED | OUTPATIENT
Start: 2021-12-05 | End: 2021-12-07 | Stop reason: HOSPADM

## 2021-12-05 RX ORDER — GABAPENTIN 300 MG/1
600 CAPSULE ORAL 2 TIMES DAILY
Status: DISCONTINUED | OUTPATIENT
Start: 2021-12-05 | End: 2021-12-07 | Stop reason: HOSPADM

## 2021-12-05 RX ORDER — SODIUM CHLORIDE 0.9 % (FLUSH) 0.9 %
10 SYRINGE (ML) INJECTION EVERY 12 HOURS PRN
Status: DISCONTINUED | OUTPATIENT
Start: 2021-12-05 | End: 2021-12-07 | Stop reason: HOSPADM

## 2021-12-05 RX ORDER — GUAIFENESIN/DEXTROMETHORPHAN 100-10MG/5
5 SYRUP ORAL EVERY 4 HOURS PRN
Status: DISCONTINUED | OUTPATIENT
Start: 2021-12-05 | End: 2021-12-07

## 2021-12-05 RX ORDER — ONDANSETRON 4 MG/1
4 TABLET, ORALLY DISINTEGRATING ORAL EVERY 6 HOURS PRN
Status: DISCONTINUED | OUTPATIENT
Start: 2021-12-05 | End: 2021-12-07 | Stop reason: HOSPADM

## 2021-12-05 RX ORDER — ENOXAPARIN SODIUM 100 MG/ML
40 INJECTION SUBCUTANEOUS EVERY 24 HOURS
Status: DISCONTINUED | OUTPATIENT
Start: 2021-12-05 | End: 2021-12-07 | Stop reason: HOSPADM

## 2021-12-05 RX ORDER — POTASSIUM CHLORIDE 20 MEQ/1
20 TABLET, EXTENDED RELEASE ORAL DAILY
Status: DISCONTINUED | OUTPATIENT
Start: 2021-12-06 | End: 2021-12-07 | Stop reason: HOSPADM

## 2021-12-05 RX ORDER — INSULIN ASPART 100 [IU]/ML
0-5 INJECTION, SOLUTION INTRAVENOUS; SUBCUTANEOUS
Status: DISCONTINUED | OUTPATIENT
Start: 2021-12-05 | End: 2021-12-07 | Stop reason: HOSPADM

## 2021-12-05 RX ORDER — CEFEPIME HYDROCHLORIDE 2 G/1
2 INJECTION, POWDER, FOR SOLUTION INTRAVENOUS
Status: DISCONTINUED | OUTPATIENT
Start: 2021-12-05 | End: 2021-12-06

## 2021-12-05 RX ORDER — IBUPROFEN 200 MG
16 TABLET ORAL
Status: DISCONTINUED | OUTPATIENT
Start: 2021-12-05 | End: 2021-12-05

## 2021-12-05 RX ORDER — MAGNESIUM SULFATE HEPTAHYDRATE 40 MG/ML
2 INJECTION, SOLUTION INTRAVENOUS
Status: COMPLETED | OUTPATIENT
Start: 2021-12-05 | End: 2021-12-05

## 2021-12-05 RX ORDER — ACETAMINOPHEN 325 MG/1
325 TABLET ORAL EVERY 6 HOURS PRN
Status: DISCONTINUED | OUTPATIENT
Start: 2021-12-06 | End: 2021-12-07 | Stop reason: HOSPADM

## 2021-12-05 RX ADMIN — IPRATROPIUM BROMIDE AND ALBUTEROL SULFATE 3 ML: .5; 2.5 SOLUTION RESPIRATORY (INHALATION) at 06:12

## 2021-12-05 RX ADMIN — ACYCLOVIR 400 MG: 200 CAPSULE ORAL at 09:12

## 2021-12-05 RX ADMIN — GABAPENTIN 600 MG: 300 CAPSULE ORAL at 09:12

## 2021-12-05 RX ADMIN — CEFEPIME 2 G: 2 INJECTION, POWDER, FOR SOLUTION INTRAVENOUS at 09:12

## 2021-12-05 RX ADMIN — IPRATROPIUM BROMIDE AND ALBUTEROL SULFATE 3 ML: .5; 2.5 SOLUTION RESPIRATORY (INHALATION) at 05:12

## 2021-12-05 RX ADMIN — SODIUM CHLORIDE, SODIUM LACTATE, POTASSIUM CHLORIDE, AND CALCIUM CHLORIDE 1503 ML: .6; .31; .03; .02 INJECTION, SOLUTION INTRAVENOUS at 04:12

## 2021-12-05 RX ADMIN — MAGNESIUM SULFATE 2 G: 2 INJECTION INTRAVENOUS at 07:12

## 2021-12-05 RX ADMIN — PIPERACILLIN SODIUM AND TAZOBACTAM SODIUM 4.5 G: 4; .5 INJECTION, POWDER, FOR SOLUTION INTRAVENOUS at 04:12

## 2021-12-05 RX ADMIN — GUAIFENESIN AND DEXTROMETHORPHAN 5 ML: 100; 10 SYRUP ORAL at 10:12

## 2021-12-05 RX ADMIN — ENOXAPARIN SODIUM 40 MG: 40 INJECTION SUBCUTANEOUS at 07:12

## 2021-12-05 RX ADMIN — IPRATROPIUM BROMIDE AND ALBUTEROL SULFATE 3 ML: .5; 3 SOLUTION RESPIRATORY (INHALATION) at 07:12

## 2021-12-06 PROBLEM — B34.8 INFECTION DUE TO HUMAN METAPNEUMOVIRUS (HMPV): Status: ACTIVE | Noted: 2019-11-04

## 2021-12-06 LAB
ALBUMIN SERPL BCP-MCNC: 3 G/DL (ref 3.5–5.2)
ALP SERPL-CCNC: 64 U/L (ref 55–135)
ALT SERPL W/O P-5'-P-CCNC: 12 U/L (ref 10–44)
ANION GAP SERPL CALC-SCNC: 16 MMOL/L (ref 8–16)
ANISOCYTOSIS BLD QL SMEAR: SLIGHT
ASCENDING AORTA: 3.57 CM
AST SERPL-CCNC: 27 U/L (ref 10–40)
AV INDEX (PROSTH): 0.54
AV MEAN GRADIENT: 16 MMHG
AV PEAK GRADIENT: 31 MMHG
AV VALVE AREA: 1.87 CM2
AV VELOCITY RATIO: 0.45
BASOPHILS # BLD AUTO: 0.02 K/UL (ref 0–0.2)
BASOPHILS NFR BLD: 0.3 % (ref 0–1.9)
BILIRUB SERPL-MCNC: 0.3 MG/DL (ref 0.1–1)
BSA FOR ECHO PROCEDURE: 1.9 M2
BUN SERPL-MCNC: 8 MG/DL (ref 8–23)
BURR CELLS BLD QL SMEAR: ABNORMAL
CALCIUM SERPL-MCNC: 8.5 MG/DL (ref 8.7–10.5)
CHLORIDE SERPL-SCNC: 102 MMOL/L (ref 95–110)
CO2 SERPL-SCNC: 22 MMOL/L (ref 23–29)
CREAT SERPL-MCNC: 0.7 MG/DL (ref 0.5–1.4)
CV ECHO LV RWT: 0.4 CM
DACRYOCYTES BLD QL SMEAR: ABNORMAL
DIFFERENTIAL METHOD: ABNORMAL
DOP CALC AO PEAK VEL: 2.77 M/S
DOP CALC AO VTI: 53.02 CM
DOP CALC LVOT AREA: 3.5 CM2
DOP CALC LVOT DIAMETER: 2.11 CM
DOP CALC LVOT PEAK VEL: 1.24 M/S
DOP CALC LVOT STROKE VOLUME: 99.36 CM3
DOP CALCLVOT PEAK VEL VTI: 28.43 CM
E WAVE DECELERATION TIME: 173.75 MSEC
E/A RATIO: 0.75
E/E' RATIO: 10.35 M/S
ECHO LV POSTERIOR WALL: 0.97 CM (ref 0.6–1.1)
EJECTION FRACTION: 58 %
EOSINOPHIL # BLD AUTO: 0 K/UL (ref 0–0.5)
EOSINOPHIL NFR BLD: 0 % (ref 0–8)
ERYTHROCYTE [DISTWIDTH] IN BLOOD BY AUTOMATED COUNT: 17.8 % (ref 11.5–14.5)
EST. GFR  (AFRICAN AMERICAN): >60 ML/MIN/1.73 M^2
EST. GFR  (NON AFRICAN AMERICAN): >60 ML/MIN/1.73 M^2
FRACTIONAL SHORTENING: 27 % (ref 28–44)
GLUCOSE SERPL-MCNC: 121 MG/DL (ref 70–110)
HCT VFR BLD AUTO: 32.6 % (ref 37–48.5)
HGB BLD-MCNC: 10 G/DL (ref 12–16)
HYPOCHROMIA BLD QL SMEAR: ABNORMAL
IMM GRANULOCYTES # BLD AUTO: 0.05 K/UL (ref 0–0.04)
IMM GRANULOCYTES NFR BLD AUTO: 0.7 % (ref 0–0.5)
INTERVENTRICULAR SEPTUM: 1.03 CM (ref 0.6–1.1)
LA MAJOR: 4.99 CM
LA MINOR: 5.03 CM
LA WIDTH: 4.64 CM
LEFT ATRIUM SIZE: 3.66 CM
LEFT ATRIUM VOLUME INDEX MOD: 38.9 ML/M2
LEFT ATRIUM VOLUME INDEX: 39.3 ML/M2
LEFT ATRIUM VOLUME MOD: 71.54 CM3
LEFT ATRIUM VOLUME: 72.32 CM3
LEFT INTERNAL DIMENSION IN SYSTOLE: 3.53 CM (ref 2.1–4)
LEFT VENTRICLE DIASTOLIC VOLUME INDEX: 58.92 ML/M2
LEFT VENTRICLE DIASTOLIC VOLUME: 108.41 ML
LEFT VENTRICLE MASS INDEX: 93 G/M2
LEFT VENTRICLE SYSTOLIC VOLUME INDEX: 28.1 ML/M2
LEFT VENTRICLE SYSTOLIC VOLUME: 51.75 ML
LEFT VENTRICULAR INTERNAL DIMENSION IN DIASTOLE: 4.82 CM (ref 3.5–6)
LEFT VENTRICULAR MASS: 171.36 G
LV LATERAL E/E' RATIO: 6.77 M/S
LV SEPTAL E/E' RATIO: 22 M/S
LYMPHOCYTES # BLD AUTO: 0.4 K/UL (ref 1–4.8)
LYMPHOCYTES NFR BLD: 5.3 % (ref 18–48)
MAGNESIUM SERPL-MCNC: 1.6 MG/DL (ref 1.6–2.6)
MCH RBC QN AUTO: 25.8 PG (ref 27–31)
MCHC RBC AUTO-ENTMCNC: 30.7 G/DL (ref 32–36)
MCV RBC AUTO: 84 FL (ref 82–98)
MONOCYTES # BLD AUTO: 1.2 K/UL (ref 0.3–1)
MONOCYTES NFR BLD: 15 % (ref 4–15)
MV PEAK A VEL: 1.18 M/S
MV PEAK E VEL: 0.88 M/S
MV STENOSIS PRESSURE HALF TIME: 50.39 MS
MV VALVE AREA P 1/2 METHOD: 4.37 CM2
NEUTROPHILS # BLD AUTO: 6 K/UL (ref 1.8–7.7)
NEUTROPHILS NFR BLD: 78.7 % (ref 38–73)
NRBC BLD-RTO: 0 /100 WBC
OVALOCYTES BLD QL SMEAR: ABNORMAL
PHOSPHATE SERPL-MCNC: 3.7 MG/DL (ref 2.7–4.5)
PISA TR MAX VEL: 3.1 M/S
PLATELET # BLD AUTO: 114 K/UL (ref 150–450)
PMV BLD AUTO: ABNORMAL FL (ref 9.2–12.9)
POCT GLUCOSE: 149 MG/DL (ref 70–110)
POCT GLUCOSE: 163 MG/DL (ref 70–110)
POCT GLUCOSE: 185 MG/DL (ref 70–110)
POIKILOCYTOSIS BLD QL SMEAR: SLIGHT
POLYCHROMASIA BLD QL SMEAR: ABNORMAL
POTASSIUM SERPL-SCNC: 4.3 MMOL/L (ref 3.5–5.1)
PROT SERPL-MCNC: 6.2 G/DL (ref 6–8.4)
RA MAJOR: 4.67 CM
RA WIDTH: 3.56 CM
RBC # BLD AUTO: 3.88 M/UL (ref 4–5.4)
RIGHT VENTRICULAR END-DIASTOLIC DIMENSION: 3.8 CM
RV TISSUE DOPPLER FREE WALL SYSTOLIC VELOCITY 1 (APICAL 4 CHAMBER VIEW): 16.62 CM/S
SINUS: 3.54 CM
SODIUM SERPL-SCNC: 140 MMOL/L (ref 136–145)
STJ: 2.99 CM
TDI LATERAL: 0.13 M/S
TDI SEPTAL: 0.04 M/S
TDI: 0.09 M/S
TR MAX PG: 38 MMHG
TRICUSPID ANNULAR PLANE SYSTOLIC EXCURSION: 2.17 CM
WBC # BLD AUTO: 7.67 K/UL (ref 3.9–12.7)

## 2021-12-06 PROCEDURE — 25000003 PHARM REV CODE 250: Mod: HCNC | Performed by: STUDENT IN AN ORGANIZED HEALTH CARE EDUCATION/TRAINING PROGRAM

## 2021-12-06 PROCEDURE — 83735 ASSAY OF MAGNESIUM: CPT | Mod: HCNC | Performed by: STUDENT IN AN ORGANIZED HEALTH CARE EDUCATION/TRAINING PROGRAM

## 2021-12-06 PROCEDURE — 25000242 PHARM REV CODE 250 ALT 637 W/ HCPCS: Mod: HCNC | Performed by: STUDENT IN AN ORGANIZED HEALTH CARE EDUCATION/TRAINING PROGRAM

## 2021-12-06 PROCEDURE — 80053 COMPREHEN METABOLIC PANEL: CPT | Mod: HCNC | Performed by: STUDENT IN AN ORGANIZED HEALTH CARE EDUCATION/TRAINING PROGRAM

## 2021-12-06 PROCEDURE — 94667 MNPJ CHEST WALL 1ST: CPT | Mod: HCNC

## 2021-12-06 PROCEDURE — 99900035 HC TECH TIME PER 15 MIN (STAT): Mod: HCNC

## 2021-12-06 PROCEDURE — 94640 AIRWAY INHALATION TREATMENT: CPT | Mod: HCNC

## 2021-12-06 PROCEDURE — 97161 PT EVAL LOW COMPLEX 20 MIN: CPT | Mod: HCNC

## 2021-12-06 PROCEDURE — 27000207 HC ISOLATION: Mod: HCNC

## 2021-12-06 PROCEDURE — 63600175 PHARM REV CODE 636 W HCPCS: Mod: HCNC | Performed by: STUDENT IN AN ORGANIZED HEALTH CARE EDUCATION/TRAINING PROGRAM

## 2021-12-06 PROCEDURE — 94664 DEMO&/EVAL PT USE INHALER: CPT | Mod: HCNC

## 2021-12-06 PROCEDURE — 99233 SBSQ HOSP IP/OBS HIGH 50: CPT | Mod: HCNC,BMT,, | Performed by: INTERNAL MEDICINE

## 2021-12-06 PROCEDURE — 94761 N-INVAS EAR/PLS OXIMETRY MLT: CPT | Mod: HCNC

## 2021-12-06 PROCEDURE — 99233 PR SUBSEQUENT HOSPITAL CARE,LEVL III: ICD-10-PCS | Mod: HCNC,BMT,, | Performed by: INTERNAL MEDICINE

## 2021-12-06 PROCEDURE — 36415 COLL VENOUS BLD VENIPUNCTURE: CPT | Mod: HCNC | Performed by: STUDENT IN AN ORGANIZED HEALTH CARE EDUCATION/TRAINING PROGRAM

## 2021-12-06 PROCEDURE — 27000221 HC OXYGEN, UP TO 24 HOURS: Mod: HCNC

## 2021-12-06 PROCEDURE — 84100 ASSAY OF PHOSPHORUS: CPT | Mod: HCNC | Performed by: STUDENT IN AN ORGANIZED HEALTH CARE EDUCATION/TRAINING PROGRAM

## 2021-12-06 PROCEDURE — 97116 GAIT TRAINING THERAPY: CPT | Mod: HCNC

## 2021-12-06 PROCEDURE — 85025 COMPLETE CBC W/AUTO DIFF WBC: CPT | Mod: HCNC | Performed by: STUDENT IN AN ORGANIZED HEALTH CARE EDUCATION/TRAINING PROGRAM

## 2021-12-06 PROCEDURE — 11000001 HC ACUTE MED/SURG PRIVATE ROOM: Mod: HCNC

## 2021-12-06 PROCEDURE — 27000646 HC AEROBIKA DEVICE: Mod: HCNC

## 2021-12-06 RX ADMIN — IPRATROPIUM BROMIDE AND ALBUTEROL SULFATE 3 ML: .5; 3 SOLUTION RESPIRATORY (INHALATION) at 07:12

## 2021-12-06 RX ADMIN — AMLODIPINE BESYLATE 5 MG: 5 TABLET ORAL at 08:12

## 2021-12-06 RX ADMIN — POTASSIUM CHLORIDE 20 MEQ: 1500 TABLET, EXTENDED RELEASE ORAL at 08:12

## 2021-12-06 RX ADMIN — GABAPENTIN 600 MG: 300 CAPSULE ORAL at 08:12

## 2021-12-06 RX ADMIN — IPRATROPIUM BROMIDE AND ALBUTEROL SULFATE 3 ML: .5; 3 SOLUTION RESPIRATORY (INHALATION) at 01:12

## 2021-12-06 RX ADMIN — ASPIRIN 81 MG: 81 TABLET, COATED ORAL at 08:12

## 2021-12-06 RX ADMIN — ACYCLOVIR 400 MG: 200 CAPSULE ORAL at 08:12

## 2021-12-06 RX ADMIN — SERTRALINE HYDROCHLORIDE 50 MG: 50 TABLET ORAL at 08:12

## 2021-12-06 RX ADMIN — ACYCLOVIR 400 MG: 200 CAPSULE ORAL at 09:12

## 2021-12-06 RX ADMIN — PANTOPRAZOLE SODIUM 40 MG: 40 TABLET, DELAYED RELEASE ORAL at 08:12

## 2021-12-06 RX ADMIN — GABAPENTIN 600 MG: 300 CAPSULE ORAL at 09:12

## 2021-12-06 RX ADMIN — CEFEPIME 2 G: 2 INJECTION, POWDER, FOR SOLUTION INTRAVENOUS at 05:12

## 2021-12-06 RX ADMIN — IPRATROPIUM BROMIDE AND ALBUTEROL SULFATE 3 ML: .5; 3 SOLUTION RESPIRATORY (INHALATION) at 02:12

## 2021-12-07 VITALS
DIASTOLIC BLOOD PRESSURE: 78 MMHG | OXYGEN SATURATION: 94 % | RESPIRATION RATE: 20 BRPM | WEIGHT: 179 LBS | HEIGHT: 63 IN | TEMPERATURE: 99 F | HEART RATE: 97 BPM | BODY MASS INDEX: 31.71 KG/M2 | SYSTOLIC BLOOD PRESSURE: 132 MMHG

## 2021-12-07 LAB
ALBUMIN SERPL BCP-MCNC: 2.6 G/DL (ref 3.5–5.2)
ALP SERPL-CCNC: 55 U/L (ref 55–135)
ALT SERPL W/O P-5'-P-CCNC: 10 U/L (ref 10–44)
ANION GAP SERPL CALC-SCNC: 14 MMOL/L (ref 8–16)
ANISOCYTOSIS BLD QL SMEAR: SLIGHT
AST SERPL-CCNC: 17 U/L (ref 10–40)
BACTERIA SPEC AEROBE CULT: NORMAL
BACTERIA SPEC AEROBE CULT: NORMAL
BASOPHILS # BLD AUTO: 0.02 K/UL (ref 0–0.2)
BASOPHILS NFR BLD: 0.5 % (ref 0–1.9)
BILIRUB SERPL-MCNC: 0.2 MG/DL (ref 0.1–1)
BUN SERPL-MCNC: 7 MG/DL (ref 8–23)
CALCIUM SERPL-MCNC: 8 MG/DL (ref 8.7–10.5)
CHLORIDE SERPL-SCNC: 105 MMOL/L (ref 95–110)
CO2 SERPL-SCNC: 22 MMOL/L (ref 23–29)
CREAT SERPL-MCNC: 0.7 MG/DL (ref 0.5–1.4)
DACRYOCYTES BLD QL SMEAR: ABNORMAL
DIFFERENTIAL METHOD: ABNORMAL
DOHLE BOD BLD QL SMEAR: PRESENT
EOSINOPHIL # BLD AUTO: 0 K/UL (ref 0–0.5)
EOSINOPHIL NFR BLD: 0.5 % (ref 0–8)
ERYTHROCYTE [DISTWIDTH] IN BLOOD BY AUTOMATED COUNT: 17.9 % (ref 11.5–14.5)
EST. GFR  (AFRICAN AMERICAN): >60 ML/MIN/1.73 M^2
EST. GFR  (NON AFRICAN AMERICAN): >60 ML/MIN/1.73 M^2
GLUCOSE SERPL-MCNC: 112 MG/DL (ref 70–110)
GRAM STN SPEC: NORMAL
HCT VFR BLD AUTO: 30.3 % (ref 37–48.5)
HGB BLD-MCNC: 9.1 G/DL (ref 12–16)
HYPOCHROMIA BLD QL SMEAR: ABNORMAL
IMM GRANULOCYTES # BLD AUTO: 0.04 K/UL (ref 0–0.04)
IMM GRANULOCYTES NFR BLD AUTO: 1 % (ref 0–0.5)
LYMPHOCYTES # BLD AUTO: 0.2 K/UL (ref 1–4.8)
LYMPHOCYTES NFR BLD: 5.5 % (ref 18–48)
MAGNESIUM SERPL-MCNC: 1.6 MG/DL (ref 1.6–2.6)
MCH RBC QN AUTO: 25.6 PG (ref 27–31)
MCHC RBC AUTO-ENTMCNC: 30 G/DL (ref 32–36)
MCV RBC AUTO: 85 FL (ref 82–98)
MONOCYTES # BLD AUTO: 0.7 K/UL (ref 0.3–1)
MONOCYTES NFR BLD: 16.3 % (ref 4–15)
NEUTROPHILS # BLD AUTO: 3 K/UL (ref 1.8–7.7)
NEUTROPHILS NFR BLD: 76.2 % (ref 38–73)
NRBC BLD-RTO: 0 /100 WBC
OVALOCYTES BLD QL SMEAR: ABNORMAL
PHOSPHATE SERPL-MCNC: 3.2 MG/DL (ref 2.7–4.5)
PLATELET # BLD AUTO: 116 K/UL (ref 150–450)
PLATELET BLD QL SMEAR: ABNORMAL
PMV BLD AUTO: ABNORMAL FL (ref 9.2–12.9)
POCT GLUCOSE: 118 MG/DL (ref 70–110)
POCT GLUCOSE: 139 MG/DL (ref 70–110)
POIKILOCYTOSIS BLD QL SMEAR: SLIGHT
POLYCHROMASIA BLD QL SMEAR: ABNORMAL
POTASSIUM SERPL-SCNC: 3.5 MMOL/L (ref 3.5–5.1)
PROT SERPL-MCNC: 5.2 G/DL (ref 6–8.4)
RBC # BLD AUTO: 3.55 M/UL (ref 4–5.4)
SCHISTOCYTES BLD QL SMEAR: ABNORMAL
SCHISTOCYTES BLD QL SMEAR: PRESENT
SODIUM SERPL-SCNC: 141 MMOL/L (ref 136–145)
TOXIC GRANULES BLD QL SMEAR: PRESENT
WBC # BLD AUTO: 3.98 K/UL (ref 3.9–12.7)

## 2021-12-07 PROCEDURE — 25000242 PHARM REV CODE 250 ALT 637 W/ HCPCS: Mod: HCNC | Performed by: STUDENT IN AN ORGANIZED HEALTH CARE EDUCATION/TRAINING PROGRAM

## 2021-12-07 PROCEDURE — 84100 ASSAY OF PHOSPHORUS: CPT | Mod: HCNC | Performed by: STUDENT IN AN ORGANIZED HEALTH CARE EDUCATION/TRAINING PROGRAM

## 2021-12-07 PROCEDURE — 25000003 PHARM REV CODE 250: Mod: HCNC | Performed by: STUDENT IN AN ORGANIZED HEALTH CARE EDUCATION/TRAINING PROGRAM

## 2021-12-07 PROCEDURE — 94668 MNPJ CHEST WALL SBSQ: CPT | Mod: HCNC

## 2021-12-07 PROCEDURE — 36415 COLL VENOUS BLD VENIPUNCTURE: CPT | Mod: HCNC | Performed by: STUDENT IN AN ORGANIZED HEALTH CARE EDUCATION/TRAINING PROGRAM

## 2021-12-07 PROCEDURE — 83735 ASSAY OF MAGNESIUM: CPT | Mod: HCNC | Performed by: STUDENT IN AN ORGANIZED HEALTH CARE EDUCATION/TRAINING PROGRAM

## 2021-12-07 PROCEDURE — 27000221 HC OXYGEN, UP TO 24 HOURS: Mod: HCNC

## 2021-12-07 PROCEDURE — 94640 AIRWAY INHALATION TREATMENT: CPT | Mod: HCNC

## 2021-12-07 PROCEDURE — 97535 SELF CARE MNGMENT TRAINING: CPT | Mod: HCNC

## 2021-12-07 PROCEDURE — 97165 OT EVAL LOW COMPLEX 30 MIN: CPT | Mod: HCNC

## 2021-12-07 PROCEDURE — 80053 COMPREHEN METABOLIC PANEL: CPT | Mod: HCNC | Performed by: STUDENT IN AN ORGANIZED HEALTH CARE EDUCATION/TRAINING PROGRAM

## 2021-12-07 PROCEDURE — 99900035 HC TECH TIME PER 15 MIN (STAT): Mod: HCNC

## 2021-12-07 PROCEDURE — 94761 N-INVAS EAR/PLS OXIMETRY MLT: CPT | Mod: HCNC

## 2021-12-07 PROCEDURE — 94664 DEMO&/EVAL PT USE INHALER: CPT | Mod: HCNC

## 2021-12-07 PROCEDURE — 99239 PR HOSPITAL DISCHARGE DAY,>30 MIN: ICD-10-PCS | Mod: HCNC,BMT,, | Performed by: INTERNAL MEDICINE

## 2021-12-07 PROCEDURE — 99239 HOSP IP/OBS DSCHRG MGMT >30: CPT | Mod: HCNC,BMT,, | Performed by: INTERNAL MEDICINE

## 2021-12-07 PROCEDURE — 63600175 PHARM REV CODE 636 W HCPCS: Mod: HCNC | Performed by: STUDENT IN AN ORGANIZED HEALTH CARE EDUCATION/TRAINING PROGRAM

## 2021-12-07 PROCEDURE — 85025 COMPLETE CBC W/AUTO DIFF WBC: CPT | Mod: HCNC | Performed by: STUDENT IN AN ORGANIZED HEALTH CARE EDUCATION/TRAINING PROGRAM

## 2021-12-07 PROCEDURE — 27000646 HC AEROBIKA DEVICE: Mod: HCNC

## 2021-12-07 RX ORDER — BENZONATATE 100 MG/1
100 CAPSULE ORAL 3 TIMES DAILY
Status: DISCONTINUED | OUTPATIENT
Start: 2021-12-07 | End: 2021-12-07 | Stop reason: HOSPADM

## 2021-12-07 RX ORDER — BENZONATATE 100 MG/1
100 CAPSULE ORAL 3 TIMES DAILY PRN
Qty: 30 CAPSULE | Refills: 0 | Status: SHIPPED | OUTPATIENT
Start: 2021-12-07 | End: 2022-07-18

## 2021-12-07 RX ORDER — GUAIFENESIN/DEXTROMETHORPHAN 100-10MG/5
5 SYRUP ORAL EVERY 6 HOURS
Status: DISCONTINUED | OUTPATIENT
Start: 2021-12-07 | End: 2021-12-07 | Stop reason: HOSPADM

## 2021-12-07 RX ORDER — ALBUTEROL SULFATE 90 UG/1
2 AEROSOL, METERED RESPIRATORY (INHALATION) EVERY 6 HOURS PRN
Qty: 6.7 G | Refills: 0 | Status: SHIPPED | OUTPATIENT
Start: 2021-12-07 | End: 2022-07-18

## 2021-12-07 RX ADMIN — PANTOPRAZOLE SODIUM 40 MG: 40 TABLET, DELAYED RELEASE ORAL at 08:12

## 2021-12-07 RX ADMIN — BENZONATATE 100 MG: 100 CAPSULE ORAL at 02:12

## 2021-12-07 RX ADMIN — IPRATROPIUM BROMIDE AND ALBUTEROL SULFATE 3 ML: .5; 3 SOLUTION RESPIRATORY (INHALATION) at 08:12

## 2021-12-07 RX ADMIN — GUAIFENESIN AND DEXTROMETHORPHAN 5 ML: 100; 10 SYRUP ORAL at 05:12

## 2021-12-07 RX ADMIN — ACYCLOVIR 400 MG: 200 CAPSULE ORAL at 08:12

## 2021-12-07 RX ADMIN — AMLODIPINE BESYLATE 5 MG: 5 TABLET ORAL at 08:12

## 2021-12-07 RX ADMIN — GUAIFENESIN AND DEXTROMETHORPHAN 5 ML: 100; 10 SYRUP ORAL at 09:12

## 2021-12-07 RX ADMIN — SERTRALINE HYDROCHLORIDE 50 MG: 50 TABLET ORAL at 08:12

## 2021-12-07 RX ADMIN — IPRATROPIUM BROMIDE AND ALBUTEROL SULFATE 3 ML: .5; 3 SOLUTION RESPIRATORY (INHALATION) at 12:12

## 2021-12-07 RX ADMIN — POTASSIUM CHLORIDE 20 MEQ: 1500 TABLET, EXTENDED RELEASE ORAL at 08:12

## 2021-12-07 RX ADMIN — IPRATROPIUM BROMIDE AND ALBUTEROL SULFATE 3 ML: .5; 3 SOLUTION RESPIRATORY (INHALATION) at 02:12

## 2021-12-07 RX ADMIN — GABAPENTIN 600 MG: 300 CAPSULE ORAL at 08:12

## 2021-12-07 RX ADMIN — BENZONATATE 100 MG: 100 CAPSULE ORAL at 09:12

## 2021-12-07 RX ADMIN — ENOXAPARIN SODIUM 40 MG: 40 INJECTION SUBCUTANEOUS at 04:12

## 2021-12-07 RX ADMIN — ASPIRIN 81 MG: 81 TABLET, COATED ORAL at 08:12

## 2021-12-09 ENCOUNTER — SPECIALTY PHARMACY (OUTPATIENT)
Dept: PHARMACY | Facility: CLINIC | Age: 71
End: 2021-12-09
Payer: MEDICARE

## 2021-12-09 ENCOUNTER — NURSE TRIAGE (OUTPATIENT)
Dept: ADMINISTRATIVE | Facility: CLINIC | Age: 71
End: 2021-12-09
Payer: MEDICARE

## 2021-12-10 ENCOUNTER — TELEPHONE (OUTPATIENT)
Dept: INTERNAL MEDICINE | Facility: CLINIC | Age: 71
End: 2021-12-10
Payer: MEDICARE

## 2021-12-10 ENCOUNTER — OUTPATIENT CASE MANAGEMENT (OUTPATIENT)
Dept: ADMINISTRATIVE | Facility: OTHER | Age: 71
End: 2021-12-10
Payer: MEDICARE

## 2021-12-10 LAB
BACTERIA BLD CULT: NORMAL
BACTERIA BLD CULT: NORMAL

## 2021-12-10 RX ORDER — ALBUTEROL SULFATE 1.25 MG/3ML
1.25 SOLUTION RESPIRATORY (INHALATION) EVERY 6 HOURS PRN
Qty: 75 ML | Refills: 2 | Status: SHIPPED | OUTPATIENT
Start: 2021-12-10 | End: 2021-12-22 | Stop reason: SDUPTHER

## 2021-12-10 NOTE — TELEPHONE ENCOUNTER
12/10 Outgoing Call to pt regarding high copay and permission for FA. Pt granted permission and provided HH size and income. Pt request application sent via mail    Mailed Pt portion 12/10  Faxed MD portion 12/10    PTL

## 2021-12-14 ENCOUNTER — TELEPHONE (OUTPATIENT)
Dept: INTERNAL MEDICINE | Facility: CLINIC | Age: 71
End: 2021-12-14
Payer: MEDICARE

## 2021-12-16 ENCOUNTER — OFFICE VISIT (OUTPATIENT)
Dept: HEMATOLOGY/ONCOLOGY | Facility: CLINIC | Age: 71
End: 2021-12-16
Payer: MEDICARE

## 2021-12-16 ENCOUNTER — INFUSION (OUTPATIENT)
Dept: INFUSION THERAPY | Facility: HOSPITAL | Age: 71
End: 2021-12-16
Payer: MEDICARE

## 2021-12-16 VITALS
TEMPERATURE: 98 F | RESPIRATION RATE: 20 BRPM | HEART RATE: 102 BPM | OXYGEN SATURATION: 92 % | SYSTOLIC BLOOD PRESSURE: 114 MMHG | DIASTOLIC BLOOD PRESSURE: 67 MMHG | HEIGHT: 63 IN | BODY MASS INDEX: 28.11 KG/M2 | WEIGHT: 158.63 LBS

## 2021-12-16 DIAGNOSIS — C90.02 MULTIPLE MYELOMA IN RELAPSE: ICD-10-CM

## 2021-12-16 DIAGNOSIS — Z94.84 HISTORY OF AUTO STEM CELL TRANSPLANT: ICD-10-CM

## 2021-12-16 DIAGNOSIS — B34.8 INFECTION DUE TO HUMAN METAPNEUMOVIRUS (HMPV): Primary | ICD-10-CM

## 2021-12-16 DIAGNOSIS — R41.3 MEMORY LOSS: ICD-10-CM

## 2021-12-16 DIAGNOSIS — R06.02 SHORTNESS OF BREATH: ICD-10-CM

## 2021-12-16 DIAGNOSIS — C90.01 MULTIPLE MYELOMA IN REMISSION: Primary | ICD-10-CM

## 2021-12-16 DIAGNOSIS — C90.00 MULTIPLE MYELOMA, REMISSION STATUS UNSPECIFIED: ICD-10-CM

## 2021-12-16 PROCEDURE — 99215 PR OFFICE/OUTPT VISIT, EST, LEVL V, 40-54 MIN: ICD-10-PCS | Mod: HCNC,BMT,S$GLB, | Performed by: INTERNAL MEDICINE

## 2021-12-16 PROCEDURE — 3072F LOW RISK FOR RETINOPATHY: CPT | Mod: HCNC,BMT,CPTII,S$GLB | Performed by: INTERNAL MEDICINE

## 2021-12-16 PROCEDURE — 3072F PR LOW RISK FOR RETINOPATHY: ICD-10-PCS | Mod: HCNC,BMT,CPTII,S$GLB | Performed by: INTERNAL MEDICINE

## 2021-12-16 PROCEDURE — 99999 PR PBB SHADOW E&M-EST. PATIENT-LVL IV: ICD-10-PCS | Mod: PBBFAC,HCNC,BMT, | Performed by: INTERNAL MEDICINE

## 2021-12-16 PROCEDURE — 99999 PR PBB SHADOW E&M-EST. PATIENT-LVL IV: CPT | Mod: PBBFAC,HCNC,BMT, | Performed by: INTERNAL MEDICINE

## 2021-12-16 PROCEDURE — 99215 OFFICE O/P EST HI 40 MIN: CPT | Mod: HCNC,BMT,S$GLB, | Performed by: INTERNAL MEDICINE

## 2021-12-16 PROCEDURE — 63600175 PHARM REV CODE 636 W HCPCS: Mod: JG,HCNC | Performed by: INTERNAL MEDICINE

## 2021-12-16 PROCEDURE — 96401 CHEMO ANTI-NEOPL SQ/IM: CPT | Mod: HCNC

## 2021-12-16 RX ORDER — BORTEZOMIB 3.5 MG/1
0.7 INJECTION, POWDER, LYOPHILIZED, FOR SOLUTION INTRAVENOUS; SUBCUTANEOUS
Status: CANCELLED | OUTPATIENT
Start: 2021-12-16

## 2021-12-16 RX ORDER — DIPHENHYDRAMINE HYDROCHLORIDE 50 MG/ML
50 INJECTION INTRAMUSCULAR; INTRAVENOUS ONCE AS NEEDED
Status: CANCELLED | OUTPATIENT
Start: 2022-01-13

## 2021-12-16 RX ORDER — DIPHENHYDRAMINE HYDROCHLORIDE 50 MG/ML
50 INJECTION INTRAMUSCULAR; INTRAVENOUS ONCE AS NEEDED
Status: DISCONTINUED | OUTPATIENT
Start: 2021-12-16 | End: 2021-12-16 | Stop reason: HOSPADM

## 2021-12-16 RX ORDER — BORTEZOMIB 3.5 MG/1
1 INJECTION, POWDER, LYOPHILIZED, FOR SOLUTION INTRAVENOUS; SUBCUTANEOUS
Status: CANCELLED | OUTPATIENT
Start: 2021-12-23

## 2021-12-16 RX ORDER — DIPHENHYDRAMINE HCL 25 MG
25 CAPSULE ORAL
Status: CANCELLED | OUTPATIENT
Start: 2021-12-23

## 2021-12-16 RX ORDER — BORTEZOMIB 3.5 MG/1
1.3 INJECTION, POWDER, LYOPHILIZED, FOR SOLUTION INTRAVENOUS; SUBCUTANEOUS
Status: CANCELLED | OUTPATIENT
Start: 2022-01-13

## 2021-12-16 RX ORDER — DIPHENHYDRAMINE HYDROCHLORIDE 50 MG/ML
50 INJECTION INTRAMUSCULAR; INTRAVENOUS ONCE AS NEEDED
Status: CANCELLED | OUTPATIENT
Start: 2021-12-30

## 2021-12-16 RX ORDER — EPINEPHRINE 0.3 MG/.3ML
0.3 INJECTION SUBCUTANEOUS ONCE AS NEEDED
Status: CANCELLED | OUTPATIENT
Start: 2021-12-23

## 2021-12-16 RX ORDER — BORTEZOMIB 3.5 MG/1
1.3 INJECTION, POWDER, LYOPHILIZED, FOR SOLUTION INTRAVENOUS; SUBCUTANEOUS
Status: CANCELLED | OUTPATIENT
Start: 2021-12-30

## 2021-12-16 RX ORDER — EPINEPHRINE 0.3 MG/.3ML
0.3 INJECTION SUBCUTANEOUS ONCE AS NEEDED
Status: CANCELLED | OUTPATIENT
Start: 2021-12-30

## 2021-12-16 RX ORDER — EPINEPHRINE 0.3 MG/.3ML
0.3 INJECTION SUBCUTANEOUS ONCE AS NEEDED
Status: CANCELLED | OUTPATIENT
Start: 2022-01-06

## 2021-12-16 RX ORDER — EPINEPHRINE 0.3 MG/.3ML
0.3 INJECTION SUBCUTANEOUS ONCE AS NEEDED
Status: DISCONTINUED | OUTPATIENT
Start: 2021-12-16 | End: 2021-12-16 | Stop reason: HOSPADM

## 2021-12-16 RX ORDER — BORTEZOMIB 3.5 MG/1
1 INJECTION, POWDER, LYOPHILIZED, FOR SOLUTION INTRAVENOUS; SUBCUTANEOUS
Status: CANCELLED | OUTPATIENT
Start: 2021-12-30

## 2021-12-16 RX ORDER — BORTEZOMIB 3.5 MG/1
0.7 INJECTION, POWDER, LYOPHILIZED, FOR SOLUTION INTRAVENOUS; SUBCUTANEOUS
Status: COMPLETED | OUTPATIENT
Start: 2021-12-16 | End: 2021-12-16

## 2021-12-16 RX ORDER — BORTEZOMIB 3.5 MG/1
1.3 INJECTION, POWDER, LYOPHILIZED, FOR SOLUTION INTRAVENOUS; SUBCUTANEOUS
Status: CANCELLED | OUTPATIENT
Start: 2021-12-23

## 2021-12-16 RX ORDER — ACETAMINOPHEN 325 MG/1
650 TABLET ORAL
Status: CANCELLED | OUTPATIENT
Start: 2021-12-23

## 2021-12-16 RX ORDER — EPINEPHRINE 0.3 MG/.3ML
0.3 INJECTION SUBCUTANEOUS ONCE AS NEEDED
Status: CANCELLED | OUTPATIENT
Start: 2022-01-13

## 2021-12-16 RX ORDER — BORTEZOMIB 3.5 MG/1
1 INJECTION, POWDER, LYOPHILIZED, FOR SOLUTION INTRAVENOUS; SUBCUTANEOUS
Status: CANCELLED | OUTPATIENT
Start: 2022-01-13

## 2021-12-16 RX ORDER — BORTEZOMIB 3.5 MG/1
1.3 INJECTION, POWDER, LYOPHILIZED, FOR SOLUTION INTRAVENOUS; SUBCUTANEOUS
Status: CANCELLED | OUTPATIENT
Start: 2022-01-06

## 2021-12-16 RX ORDER — BORTEZOMIB 3.5 MG/1
1 INJECTION, POWDER, LYOPHILIZED, FOR SOLUTION INTRAVENOUS; SUBCUTANEOUS
Status: CANCELLED | OUTPATIENT
Start: 2022-01-06

## 2021-12-16 RX ORDER — DIPHENHYDRAMINE HYDROCHLORIDE 50 MG/ML
50 INJECTION INTRAMUSCULAR; INTRAVENOUS ONCE AS NEEDED
Status: CANCELLED | OUTPATIENT
Start: 2021-12-23

## 2021-12-16 RX ORDER — DIPHENHYDRAMINE HYDROCHLORIDE 50 MG/ML
50 INJECTION INTRAMUSCULAR; INTRAVENOUS ONCE AS NEEDED
Status: CANCELLED | OUTPATIENT
Start: 2022-01-06

## 2021-12-16 RX ADMIN — BORTEZOMIB 1.3 MG: 3.5 INJECTION, POWDER, LYOPHILIZED, FOR SOLUTION INTRAVENOUS; SUBCUTANEOUS at 03:12

## 2021-12-17 ENCOUNTER — OFFICE VISIT (OUTPATIENT)
Dept: INTERNAL MEDICINE | Facility: CLINIC | Age: 71
End: 2021-12-17
Payer: MEDICARE

## 2021-12-17 ENCOUNTER — HOSPITAL ENCOUNTER (OUTPATIENT)
Dept: RADIOLOGY | Facility: HOSPITAL | Age: 71
Discharge: HOME | End: 2021-12-17
Attending: INTERNAL MEDICINE
Payer: MEDICARE

## 2021-12-17 VITALS
SYSTOLIC BLOOD PRESSURE: 104 MMHG | HEART RATE: 100 BPM | DIASTOLIC BLOOD PRESSURE: 78 MMHG | OXYGEN SATURATION: 95 % | WEIGHT: 158.75 LBS | BODY MASS INDEX: 28.13 KG/M2 | HEIGHT: 63 IN

## 2021-12-17 DIAGNOSIS — G62.0 DRUG-INDUCED POLYNEUROPATHY: ICD-10-CM

## 2021-12-17 DIAGNOSIS — R63.4 ABNORMAL WEIGHT LOSS: ICD-10-CM

## 2021-12-17 DIAGNOSIS — J44.9 CHRONIC OBSTRUCTIVE PULMONARY DISEASE, UNSPECIFIED COPD TYPE: ICD-10-CM

## 2021-12-17 DIAGNOSIS — E11.51 TYPE 2 DIABETES MELLITUS WITH DIABETIC PERIPHERAL ANGIOPATHY WITHOUT GANGRENE, WITH LONG-TERM CURRENT USE OF INSULIN: ICD-10-CM

## 2021-12-17 DIAGNOSIS — C90.02 MULTIPLE MYELOMA IN RELAPSE: Primary | ICD-10-CM

## 2021-12-17 DIAGNOSIS — D69.6 THROMBOCYTOPENIA, UNSPECIFIED: ICD-10-CM

## 2021-12-17 DIAGNOSIS — Z79.4 TYPE 2 DIABETES MELLITUS WITH DIABETIC PERIPHERAL ANGIOPATHY WITHOUT GANGRENE, WITH LONG-TERM CURRENT USE OF INSULIN: ICD-10-CM

## 2021-12-17 DIAGNOSIS — F33.42 RECURRENT MAJOR DEPRESSIVE DISORDER, IN FULL REMISSION: ICD-10-CM

## 2021-12-17 DIAGNOSIS — I50.32 CHRONIC DIASTOLIC HEART FAILURE: ICD-10-CM

## 2021-12-17 DIAGNOSIS — B34.8 INFECTION DUE TO HUMAN METAPNEUMOVIRUS (HMPV): ICD-10-CM

## 2021-12-17 DIAGNOSIS — I69.351 HEMIPLEGIA AND HEMIPARESIS FOLLOWING CEREBRAL INFARCTION AFFECTING RIGHT DOMINANT SIDE: ICD-10-CM

## 2021-12-17 PROCEDURE — 3008F BODY MASS INDEX DOCD: CPT | Mod: HCNC,BMT,CPTII,S$GLB | Performed by: INTERNAL MEDICINE

## 2021-12-17 PROCEDURE — 71046 X-RAY EXAM CHEST 2 VIEWS: CPT | Mod: 26,HCNC,BMT, | Performed by: RADIOLOGY

## 2021-12-17 PROCEDURE — 3078F PR MOST RECENT DIASTOLIC BLOOD PRESSURE < 80 MM HG: ICD-10-PCS | Mod: HCNC,BMT,CPTII,S$GLB | Performed by: INTERNAL MEDICINE

## 2021-12-17 PROCEDURE — 99214 OFFICE O/P EST MOD 30 MIN: CPT | Mod: HCNC,BMT,S$GLB, | Performed by: INTERNAL MEDICINE

## 2021-12-17 PROCEDURE — 71046 XR CHEST PA AND LATERAL: ICD-10-PCS | Mod: 26,HCNC,BMT, | Performed by: RADIOLOGY

## 2021-12-17 PROCEDURE — 1111F DSCHRG MED/CURRENT MED MERGE: CPT | Mod: HCNC,BMT,CPTII,S$GLB | Performed by: INTERNAL MEDICINE

## 2021-12-17 PROCEDURE — 3074F SYST BP LT 130 MM HG: CPT | Mod: HCNC,BMT,CPTII,S$GLB | Performed by: INTERNAL MEDICINE

## 2021-12-17 PROCEDURE — 3008F PR BODY MASS INDEX (BMI) DOCUMENTED: ICD-10-PCS | Mod: HCNC,BMT,CPTII,S$GLB | Performed by: INTERNAL MEDICINE

## 2021-12-17 PROCEDURE — 3074F PR MOST RECENT SYSTOLIC BLOOD PRESSURE < 130 MM HG: ICD-10-PCS | Mod: HCNC,BMT,CPTII,S$GLB | Performed by: INTERNAL MEDICINE

## 2021-12-17 PROCEDURE — 1125F PR PAIN SEVERITY QUANTIFIED, PAIN PRESENT: ICD-10-PCS | Mod: HCNC,BMT,CPTII,S$GLB | Performed by: INTERNAL MEDICINE

## 2021-12-17 PROCEDURE — 3052F PR MOST RECENT HEMOGLOBIN A1C LEVEL 8.0 - < 9.0%: ICD-10-PCS | Mod: HCNC,BMT,CPTII,S$GLB | Performed by: INTERNAL MEDICINE

## 2021-12-17 PROCEDURE — 3078F DIAST BP <80 MM HG: CPT | Mod: HCNC,BMT,CPTII,S$GLB | Performed by: INTERNAL MEDICINE

## 2021-12-17 PROCEDURE — 99999 PR PBB SHADOW E&M-EST. PATIENT-LVL III: ICD-10-PCS | Mod: PBBFAC,HCNC,BMT, | Performed by: INTERNAL MEDICINE

## 2021-12-17 PROCEDURE — 3288F FALL RISK ASSESSMENT DOCD: CPT | Mod: HCNC,BMT,CPTII,S$GLB | Performed by: INTERNAL MEDICINE

## 2021-12-17 PROCEDURE — 99999 PR PBB SHADOW E&M-EST. PATIENT-LVL III: CPT | Mod: PBBFAC,HCNC,BMT, | Performed by: INTERNAL MEDICINE

## 2021-12-17 PROCEDURE — 1101F PR PT FALLS ASSESS DOC 0-1 FALLS W/OUT INJ PAST YR: ICD-10-PCS | Mod: HCNC,BMT,CPTII,S$GLB | Performed by: INTERNAL MEDICINE

## 2021-12-17 PROCEDURE — 3288F PR FALLS RISK ASSESSMENT DOCUMENTED: ICD-10-PCS | Mod: HCNC,BMT,CPTII,S$GLB | Performed by: INTERNAL MEDICINE

## 2021-12-17 PROCEDURE — 1101F PT FALLS ASSESS-DOCD LE1/YR: CPT | Mod: HCNC,BMT,CPTII,S$GLB | Performed by: INTERNAL MEDICINE

## 2021-12-17 PROCEDURE — 3052F HG A1C>EQUAL 8.0%<EQUAL 9.0%: CPT | Mod: HCNC,BMT,CPTII,S$GLB | Performed by: INTERNAL MEDICINE

## 2021-12-17 PROCEDURE — 71046 X-RAY EXAM CHEST 2 VIEWS: CPT | Mod: TC,HCNC

## 2021-12-17 PROCEDURE — 1125F AMNT PAIN NOTED PAIN PRSNT: CPT | Mod: HCNC,BMT,CPTII,S$GLB | Performed by: INTERNAL MEDICINE

## 2021-12-17 PROCEDURE — 99214 PR OFFICE/OUTPT VISIT, EST, LEVL IV, 30-39 MIN: ICD-10-PCS | Mod: HCNC,BMT,S$GLB, | Performed by: INTERNAL MEDICINE

## 2021-12-17 PROCEDURE — 1111F PR DISCHARGE MEDS RECONCILED W/ CURRENT OUTPATIENT MED LIST: ICD-10-PCS | Mod: HCNC,BMT,CPTII,S$GLB | Performed by: INTERNAL MEDICINE

## 2021-12-20 ENCOUNTER — DOCUMENTATION ONLY (OUTPATIENT)
Dept: HEMATOLOGY/ONCOLOGY | Facility: CLINIC | Age: 71
End: 2021-12-20
Payer: MEDICARE

## 2021-12-20 ENCOUNTER — HOSPITAL ENCOUNTER (OUTPATIENT)
Dept: RADIOLOGY | Facility: HOSPITAL | Age: 71
Discharge: HOME | End: 2021-12-20
Attending: INTERNAL MEDICINE
Payer: MEDICARE

## 2021-12-20 DIAGNOSIS — Z12.31 OTHER SCREENING MAMMOGRAM: ICD-10-CM

## 2021-12-20 PROCEDURE — 77067 SCR MAMMO BI INCL CAD: CPT | Mod: TC,HCNC

## 2021-12-20 PROCEDURE — 77067 MAMMO DIGITAL SCREENING BILAT WITH TOMO: ICD-10-PCS | Mod: 26,HCNC,BMT, | Performed by: RADIOLOGY

## 2021-12-20 PROCEDURE — 77063 MAMMO DIGITAL SCREENING BILAT WITH TOMO: ICD-10-PCS | Mod: 26,HCNC,BMT, | Performed by: RADIOLOGY

## 2021-12-20 PROCEDURE — 77063 BREAST TOMOSYNTHESIS BI: CPT | Mod: 26,HCNC,BMT, | Performed by: RADIOLOGY

## 2021-12-20 PROCEDURE — 77067 SCR MAMMO BI INCL CAD: CPT | Mod: 26,HCNC,BMT, | Performed by: RADIOLOGY

## 2021-12-21 ENCOUNTER — DOCUMENTATION ONLY (OUTPATIENT)
Dept: HEMATOLOGY/ONCOLOGY | Facility: CLINIC | Age: 71
End: 2021-12-21
Payer: MEDICARE

## 2021-12-21 PROCEDURE — G0180 MD CERTIFICATION HHA PATIENT: HCPCS | Mod: ,,, | Performed by: INTERNAL MEDICINE

## 2021-12-21 PROCEDURE — G0180 PR HOME HEALTH MD CERTIFICATION: ICD-10-PCS | Mod: ,,, | Performed by: INTERNAL MEDICINE

## 2021-12-22 ENCOUNTER — OUTPATIENT CASE MANAGEMENT (OUTPATIENT)
Dept: ADMINISTRATIVE | Facility: OTHER | Age: 71
End: 2021-12-22
Payer: MEDICARE

## 2021-12-23 ENCOUNTER — INFUSION (OUTPATIENT)
Dept: INFUSION THERAPY | Facility: HOSPITAL | Age: 71
End: 2021-12-23
Payer: MEDICARE

## 2021-12-23 ENCOUNTER — TELEPHONE (OUTPATIENT)
Dept: NEUROLOGY | Facility: CLINIC | Age: 71
End: 2021-12-23
Payer: MEDICARE

## 2021-12-23 VITALS
TEMPERATURE: 98 F | DIASTOLIC BLOOD PRESSURE: 65 MMHG | OXYGEN SATURATION: 95 % | RESPIRATION RATE: 18 BRPM | SYSTOLIC BLOOD PRESSURE: 113 MMHG | HEART RATE: 79 BPM

## 2021-12-23 DIAGNOSIS — C90.02 MULTIPLE MYELOMA IN RELAPSE: ICD-10-CM

## 2021-12-23 DIAGNOSIS — C90.01 MULTIPLE MYELOMA IN REMISSION: Primary | ICD-10-CM

## 2021-12-23 PROCEDURE — 25000003 PHARM REV CODE 250: Mod: HCNC | Performed by: INTERNAL MEDICINE

## 2021-12-23 PROCEDURE — 63600175 PHARM REV CODE 636 W HCPCS: Mod: TB,HCNC | Performed by: INTERNAL MEDICINE

## 2021-12-23 PROCEDURE — 96401 CHEMO ANTI-NEOPL SQ/IM: CPT | Mod: HCNC

## 2021-12-23 RX ORDER — DIPHENHYDRAMINE HCL 25 MG
25 CAPSULE ORAL
Status: COMPLETED | OUTPATIENT
Start: 2021-12-23 | End: 2021-12-23

## 2021-12-23 RX ORDER — BORTEZOMIB 3.5 MG/1
0.7 INJECTION, POWDER, LYOPHILIZED, FOR SOLUTION INTRAVENOUS; SUBCUTANEOUS
Status: COMPLETED | OUTPATIENT
Start: 2021-12-23 | End: 2021-12-23

## 2021-12-23 RX ORDER — BORTEZOMIB 3.5 MG/1
0.7 INJECTION, POWDER, LYOPHILIZED, FOR SOLUTION INTRAVENOUS; SUBCUTANEOUS
Status: CANCELLED | OUTPATIENT
Start: 2022-01-06

## 2021-12-23 RX ORDER — DIPHENHYDRAMINE HYDROCHLORIDE 50 MG/ML
50 INJECTION INTRAMUSCULAR; INTRAVENOUS ONCE AS NEEDED
Status: DISCONTINUED | OUTPATIENT
Start: 2021-12-23 | End: 2021-12-23 | Stop reason: HOSPADM

## 2021-12-23 RX ORDER — ACETAMINOPHEN 325 MG/1
650 TABLET ORAL
Status: COMPLETED | OUTPATIENT
Start: 2021-12-23 | End: 2021-12-23

## 2021-12-23 RX ORDER — BORTEZOMIB 3.5 MG/1
0.7 INJECTION, POWDER, LYOPHILIZED, FOR SOLUTION INTRAVENOUS; SUBCUTANEOUS
Status: CANCELLED | OUTPATIENT
Start: 2022-01-13

## 2021-12-23 RX ORDER — EPINEPHRINE 0.3 MG/.3ML
0.3 INJECTION SUBCUTANEOUS ONCE AS NEEDED
Status: DISCONTINUED | OUTPATIENT
Start: 2021-12-23 | End: 2021-12-23 | Stop reason: HOSPADM

## 2021-12-23 RX ORDER — BORTEZOMIB 3.5 MG/1
0.7 INJECTION, POWDER, LYOPHILIZED, FOR SOLUTION INTRAVENOUS; SUBCUTANEOUS
Status: CANCELLED | OUTPATIENT
Start: 2021-12-23

## 2021-12-23 RX ORDER — BORTEZOMIB 3.5 MG/1
0.7 INJECTION, POWDER, LYOPHILIZED, FOR SOLUTION INTRAVENOUS; SUBCUTANEOUS
Status: CANCELLED | OUTPATIENT
Start: 2021-12-30

## 2021-12-23 RX ADMIN — ACETAMINOPHEN 650 MG: 325 TABLET ORAL at 01:12

## 2021-12-23 RX ADMIN — BORTEZOMIB 1.3 MG: 3.5 INJECTION, POWDER, LYOPHILIZED, FOR SOLUTION INTRAVENOUS; SUBCUTANEOUS at 02:12

## 2021-12-23 RX ADMIN — DARATUMUMAB AND HYALURONIDASE-FIHJ (HUMAN RECOMBINANT) 1800 MG: 1800; 30000 INJECTION SUBCUTANEOUS at 02:12

## 2021-12-23 RX ADMIN — DIPHENHYDRAMINE HYDROCHLORIDE 25 MG: 25 CAPSULE ORAL at 01:12

## 2021-12-23 NOTE — TELEPHONE ENCOUNTER
[EDIT] Received pt's proof of income via pt's spouse drop off.     Faxed completed application in to [employer] at [phone]      [EDIT] Incoming call from pt's spouse - will come to [facility] to drop off the proof of income today 12/23 or tomorrow 12/24 12/23 - LVM - pt portion of PAP received - missing proof of income    PTL

## 2021-12-30 ENCOUNTER — INFUSION (OUTPATIENT)
Dept: INFUSION THERAPY | Facility: HOSPITAL | Age: 71
End: 2021-12-30
Payer: MEDICARE

## 2021-12-30 VITALS
DIASTOLIC BLOOD PRESSURE: 69 MMHG | TEMPERATURE: 98 F | SYSTOLIC BLOOD PRESSURE: 135 MMHG | RESPIRATION RATE: 18 BRPM | HEART RATE: 73 BPM

## 2021-12-30 DIAGNOSIS — C90.01 MULTIPLE MYELOMA IN REMISSION: Primary | ICD-10-CM

## 2021-12-30 DIAGNOSIS — C90.02 MULTIPLE MYELOMA IN RELAPSE: ICD-10-CM

## 2021-12-30 PROCEDURE — 63600175 PHARM REV CODE 636 W HCPCS: Mod: JW,JG,HCNC | Performed by: INTERNAL MEDICINE

## 2021-12-30 PROCEDURE — 96401 CHEMO ANTI-NEOPL SQ/IM: CPT | Mod: HCNC

## 2021-12-30 RX ORDER — BORTEZOMIB 3.5 MG/1
0.7 INJECTION, POWDER, LYOPHILIZED, FOR SOLUTION INTRAVENOUS; SUBCUTANEOUS
Status: COMPLETED | OUTPATIENT
Start: 2021-12-30 | End: 2021-12-30

## 2021-12-30 RX ADMIN — BORTEZOMIB 1.3 MG: 3.5 INJECTION, POWDER, LYOPHILIZED, FOR SOLUTION INTRAVENOUS; SUBCUTANEOUS at 01:12

## 2022-01-03 NOTE — TELEPHONE ENCOUNTER
Specialty Pharmacy - Medication/Referral Authorization  Specialty Pharmacy - Refill Coordination    Specialty Medication Orders Linked to Encounter    Flowsheet Row Most Recent Value   Medication #1 alirocumab (PRALUENT PEN) 75 mg/mL PnIj (Order#[id number], Rx#[id number])          Refill Questions - Documented Responses    Flowsheet Row Most Recent Value   Patient Availability and HIPAA Verification    Does patient want to proceed with activity? Yes   HIPAA/medical authority confirmed? Yes   Relationship to patient of person spoken to? Self   Refill Screening Questions    Would patient like to speak to a pharmacist? No   When does the patient need to receive the medication? 01/05/22   Refill Delivery Questions    How will the patient receive the medication? Delivery Laura   When does the patient need to receive the medication? 01/05/22   Shipping Address Home   Address in [facility] confirmed and updated if neccessary? Yes   Expected Copay ($) 0   Is the patient able to afford the medication copay? Yes   Payment Method zero copay   Days supply of Refill 28   Supplies needed? No supplies needed   Refill activity completed? Yes   Refill activity plan Refill scheduled   Shipment/Pickup Date: 01/05/22          Current Outpatient Medications   Medication Sig    acyclovir (ZOVIRAX) 400 MG tablet Take by mouth 2 (two) times daily.    albuterol (ACCUNEB) 1.25 mg/3 mL Nebu Take 3 mLs (1.25 mg total) by nebulization every 6 (six) hours as needed (wheeze/cough). Rescue    albuterol (PROVENTIL HFA) 90 mcg/actuation inhaler Inhale 2 puffs into the lungs every 6 (six) hours as needed for Wheezing. Rescue    alirocumab (PRALUENT PEN) 75 mg/mL PnIj Inject 1 mL (75 mg total) into the skin every 14 (fourteen) days.    amLODIPine (NORVASC) 5 MG tablet TAKE 1 TABLET BY MOUTH EVERY DAY    aspirin (ECOTRIN) 81 MG EC tablet Take 1 tablet (81 mg total) by mouth once daily.    benzonatate (TESSALON) 100 MG capsule Take 1  capsule (100 mg total) by mouth 3 (three) times daily as needed for Cough.    ENGERIX-B, PF, 20 mcg/mL Syrg     gabapentin (NEURONTIN) 300 MG capsule Take 2 capsules (600 mg total) by mouth 2 (two) times daily.    guaiFENesin (MUCINEX) 600 mg 12 hr tablet Take 1 tablet (600 mg total) by mouth 2 (two) times daily.    metFORMIN (GLUCOPHAGE) 1000 MG tablet Take 1 tablet (1,000 mg total) by mouth 2 (two) times daily with meals.    omeprazole (PRILOSEC) 40 MG capsule TAKE 1 CAPSULE BY MOUTH EVERY DAY    ondansetron (ZOFRAN-ODT) 4 MG TbDL Take 1 tablet (4 mg total) by mouth every 6 (six) hours as needed (nausea). (Patient not taking: No sig reported)    potassium chloride SA (K-DUR,KLOR-CON) 20 MEQ tablet TAKE 1 TABLET BY MOUTH ONCE DAILY    traMADoL (ULTRAM) 50 mg tablet Take 1 tablet (50 mg total) by mouth 2 (two) times daily as needed for Pain.    VELCADE 3.5 mg injection     zoledronic 4 mg/100 mL PgBk infusion    Last reviewed on 12/17/2021  4:03 PM by Mary Lancaster MA    Review of patient's allergies indicates:   Allergen Reactions    Lipitor [atorvastatin] Itching     Itching, elevated cpk, muscle aches, statin induced myositis    Last reviewed on  12/30/2021 12:22 PM by Ilya Ambriz      Tasks added this encounter   12/14/2021 - Refill Call (Auto Added)  12/9/2021 - Welcome Call  12/9/2021 - Referral Authorization   Tasks due within next 3 months   No tasks due.     Nikia Purcell, PharmD  [facility]  [address] [place] [id number]  Phone: [phone]  Fax: [phone]

## 2022-01-03 NOTE — TELEPHONE ENCOUNTER
Charlene Dawit Renewal Info    BIN: [id number]  PCN: [id number]  Group: [id number]  ID: [id number]  Effective dates: 1/3/22 - 12/8/22    ANN   Adequate: hears normal conversation without difficulty

## 2022-01-06 ENCOUNTER — EXTERNAL HOME HEALTH (OUTPATIENT)
Dept: HOME HEALTH SERVICES | Facility: HOSPITAL | Age: 72
End: 2022-01-06
Payer: MEDICARE

## 2022-01-06 ENCOUNTER — INFUSION (OUTPATIENT)
Dept: INFUSION THERAPY | Facility: HOSPITAL | Age: 72
End: 2022-01-06
Payer: MEDICARE

## 2022-01-06 VITALS
DIASTOLIC BLOOD PRESSURE: 75 MMHG | HEART RATE: 76 BPM | TEMPERATURE: 98 F | SYSTOLIC BLOOD PRESSURE: 127 MMHG | RESPIRATION RATE: 17 BRPM

## 2022-01-06 DIAGNOSIS — C90.02 MULTIPLE MYELOMA IN RELAPSE: ICD-10-CM

## 2022-01-06 DIAGNOSIS — C90.01 MULTIPLE MYELOMA IN REMISSION: Primary | ICD-10-CM

## 2022-01-06 PROCEDURE — 63600175 PHARM REV CODE 636 W HCPCS: Mod: JG,HCNC | Performed by: INTERNAL MEDICINE

## 2022-01-06 PROCEDURE — 96401 CHEMO ANTI-NEOPL SQ/IM: CPT | Mod: HCNC

## 2022-01-06 RX ORDER — EPINEPHRINE 0.3 MG/.3ML
0.3 INJECTION SUBCUTANEOUS ONCE AS NEEDED
Status: DISCONTINUED | OUTPATIENT
Start: 2022-01-06 | End: 2022-01-06 | Stop reason: HOSPADM

## 2022-01-06 RX ORDER — DIPHENHYDRAMINE HYDROCHLORIDE 50 MG/ML
50 INJECTION INTRAMUSCULAR; INTRAVENOUS ONCE AS NEEDED
Status: DISCONTINUED | OUTPATIENT
Start: 2022-01-06 | End: 2022-01-06 | Stop reason: HOSPADM

## 2022-01-06 RX ORDER — BORTEZOMIB 3.5 MG/1
0.7 INJECTION, POWDER, LYOPHILIZED, FOR SOLUTION INTRAVENOUS; SUBCUTANEOUS
Status: COMPLETED | OUTPATIENT
Start: 2022-01-06 | End: 2022-01-06

## 2022-01-06 RX ADMIN — BORTEZOMIB 1.3 MG: 3.5 INJECTION, POWDER, LYOPHILIZED, FOR SOLUTION INTRAVENOUS; SUBCUTANEOUS at 01:01

## 2022-01-09 NOTE — PROGRESS NOTES
This note was generated with [employer] voice recognition software. I apologize for any possible typographical errors.    She is a well-known patient to me coming in today to EEG can cough and sore throat she has been having.  Her daughter had called telling me that she had been emergency room twice and she was kept for 2 days.  The daughter complained that she was not eating well and that she has been weak.  She has multiple myeloma and she is being treated by Hematology-Oncology.  The when she was the hospital on the 7th of December she was diagnosed with pneumonia positive for metapneumovirus.  She also had a tTG that showed a EF of 58%.  She had some segmental wall and septal wall motion abnormalities.  They postponed her chemo to see if she starts to improve.  She has home oxygen and she is on oxygen for last 6 months at home.  Today she is in room with no oxygen her pulse ox is 95%.  She does have some anemia most recent H&H me know 9.1 and 30.3.  Her hemoglobin A1c was 12-,1/2 November her prednisone has been weaned and come down to 8.1.      His sugars were high when she was on prednisone for her multiple myeloma.  His glucose was up to 426 it has come down to 100's      Recent chest x-ray shows no change his remained stable.  And she denies any current chest pain.  She says she is feeling better but still her appetite has been decreased.  He is not having any can chest pain.  She says the breathing has been doing better.  She denies any problems to depression.  She is able to walk around but she gets fatigued easily    Her past medical history significant for diabetes mellitus type 2, multiple myeloma in relapse, diabetes mellitus type 2, thrombocytopenia, recurrent major depression disorder has been a full remission, a drug-induced poly neuropathy, chronic diastolic heart failure, history of stroke with hemiplegia affecting the right side which has mostly resolved but still weak, colon polyps, hyperlipidemia,  "anemia.     review of systems:  She says she is still fatigued.  Her weight has gone down to 152 lb.  She has lost about 20 lb since I saw her November.  She has had some nausea that is resolving.  She has no diarrhea constipation.  She has had a cough but this is getting better but she still taking Mucinex DM.  She says he sleeping well.  She says that her extremities gets this but she denies any limb pain.    /78 (BP Location: Left arm, Patient Position: Sitting, BP Method: Large (Manual))   Pulse 100   Ht 5' 3" (1.6 m)   Wt 72 kg (158 lb 11.7 oz)   LMP  (LMP Unknown)   SpO2 95%   BMI 28.12 kg/m²     She is a elderly female in no acute distress.  She does appear cachectic.  Her neck is supple.  She has no JVD.  Thyroid is not enlarged.  Chest is clear bilaterally.  She is not using accessory muscles to breathe.  Cardiovascular exam is unremarkable except for tachycardia.  The abdominal exam is unremarkable.  Lower extremities she has trace edema bilaterally.    Assessment and plan:    Multiple myeloma in relapse    Type 2 diabetes mellitus with diabetic peripheral angiopathy without gangrene, with long-term current use of insulin    Thrombocytopenia, unspecified    Recurrent major depressive disorder, in full remission    Drug-induced polyneuropathy    Chronic diastolic heart failure    Hemiplegia and hemiparesis following cerebral infarction affecting right dominant side    Chronic obstructive pulmonary disease, unspecified COPD type    Abnormal weight loss    Looks like she is just slow to recover from the pneumonia and also she remains on chemotherapy for multiple myeloma.  Today we discussed a lot about trying to continue eating.  Multiple small meals.  The prednisone was what was making her glucose so poorly controlled and glucose looks a lot better.  I am okay with liberalizing her diet.  She needs fat and protein.  I recommend a bowl ice cream every night maybe with some [place] instant " breakfast sprinkled on top or started in.  If she is having nausea we can treat that with some Zofran.  I like to see her again addendum 4-6 weeks to make sure she is not continuing to decline.  She has a follow-up Hematology-Oncology for multiple myeloma.  If any go back on the prednisone when he has it to adjust her medications to control her sugars and also check her sugars

## 2022-01-13 ENCOUNTER — INFUSION (OUTPATIENT)
Dept: INFUSION THERAPY | Facility: HOSPITAL | Age: 72
End: 2022-01-13
Payer: MEDICARE

## 2022-01-13 VITALS
TEMPERATURE: 99 F | DIASTOLIC BLOOD PRESSURE: 74 MMHG | RESPIRATION RATE: 17 BRPM | HEART RATE: 68 BPM | SYSTOLIC BLOOD PRESSURE: 142 MMHG

## 2022-01-13 DIAGNOSIS — C90.02 MULTIPLE MYELOMA IN RELAPSE: ICD-10-CM

## 2022-01-13 DIAGNOSIS — C90.01 MULTIPLE MYELOMA IN REMISSION: Primary | ICD-10-CM

## 2022-01-13 PROCEDURE — 63600175 PHARM REV CODE 636 W HCPCS: Mod: JW,JG,HCNC | Performed by: INTERNAL MEDICINE

## 2022-01-13 PROCEDURE — 96401 CHEMO ANTI-NEOPL SQ/IM: CPT | Mod: HCNC

## 2022-01-13 RX ORDER — EPINEPHRINE 0.3 MG/.3ML
0.3 INJECTION SUBCUTANEOUS ONCE AS NEEDED
Status: DISCONTINUED | OUTPATIENT
Start: 2022-01-13 | End: 2022-01-13 | Stop reason: HOSPADM

## 2022-01-13 RX ORDER — BORTEZOMIB 3.5 MG/1
0.7 INJECTION, POWDER, LYOPHILIZED, FOR SOLUTION INTRAVENOUS; SUBCUTANEOUS
Status: COMPLETED | OUTPATIENT
Start: 2022-01-13 | End: 2022-01-13

## 2022-01-13 RX ORDER — DIPHENHYDRAMINE HYDROCHLORIDE 50 MG/ML
50 INJECTION INTRAMUSCULAR; INTRAVENOUS ONCE AS NEEDED
Status: DISCONTINUED | OUTPATIENT
Start: 2022-01-13 | End: 2022-01-13 | Stop reason: HOSPADM

## 2022-01-13 RX ADMIN — BORTEZOMIB 1.3 MG: 3.5 INJECTION, POWDER, LYOPHILIZED, FOR SOLUTION INTRAVENOUS; SUBCUTANEOUS at 01:01

## 2022-01-18 ENCOUNTER — PATIENT MESSAGE (OUTPATIENT)
Dept: ADMINISTRATIVE | Facility: HOSPITAL | Age: 72
End: 2022-01-18
Payer: MEDICARE

## 2022-01-20 ENCOUNTER — INFUSION (OUTPATIENT)
Dept: INFUSION THERAPY | Facility: HOSPITAL | Age: 72
End: 2022-01-20
Payer: MEDICARE

## 2022-01-20 ENCOUNTER — OFFICE VISIT (OUTPATIENT)
Dept: HEMATOLOGY/ONCOLOGY | Facility: CLINIC | Age: 72
End: 2022-01-20
Payer: MEDICARE

## 2022-01-20 ENCOUNTER — LAB VISIT (OUTPATIENT)
Dept: LAB | Facility: HOSPITAL | Age: 72
End: 2022-01-20
Attending: INTERNAL MEDICINE
Payer: MEDICARE

## 2022-01-20 VITALS
HEIGHT: 63 IN | DIASTOLIC BLOOD PRESSURE: 70 MMHG | TEMPERATURE: 98 F | BODY MASS INDEX: 26.99 KG/M2 | SYSTOLIC BLOOD PRESSURE: 138 MMHG | WEIGHT: 152.31 LBS | RESPIRATION RATE: 18 BRPM | HEART RATE: 75 BPM

## 2022-01-20 VITALS
TEMPERATURE: 98 F | HEART RATE: 67 BPM | HEIGHT: 63 IN | WEIGHT: 152.31 LBS | SYSTOLIC BLOOD PRESSURE: 150 MMHG | BODY MASS INDEX: 26.99 KG/M2 | RESPIRATION RATE: 16 BRPM | OXYGEN SATURATION: 94 % | DIASTOLIC BLOOD PRESSURE: 81 MMHG

## 2022-01-20 DIAGNOSIS — E11.51 TYPE 2 DIABETES MELLITUS WITH DIABETIC PERIPHERAL ANGIOPATHY WITHOUT GANGRENE, WITH LONG-TERM CURRENT USE OF INSULIN: ICD-10-CM

## 2022-01-20 DIAGNOSIS — Z94.81 STATUS POST AUTOLOGOUS BONE MARROW TRANSPLANT: Primary | ICD-10-CM

## 2022-01-20 DIAGNOSIS — C90.02 MULTIPLE MYELOMA IN RELAPSE: ICD-10-CM

## 2022-01-20 DIAGNOSIS — Z94.84 HISTORY OF AUTOLOGOUS STEM CELL TRANSPLANT: ICD-10-CM

## 2022-01-20 DIAGNOSIS — C90.01 MULTIPLE MYELOMA IN REMISSION: ICD-10-CM

## 2022-01-20 DIAGNOSIS — G31.84 MCI (MILD COGNITIVE IMPAIRMENT): ICD-10-CM

## 2022-01-20 DIAGNOSIS — Z79.4 TYPE 2 DIABETES MELLITUS WITH DIABETIC PERIPHERAL ANGIOPATHY WITHOUT GANGRENE, WITH LONG-TERM CURRENT USE OF INSULIN: ICD-10-CM

## 2022-01-20 DIAGNOSIS — C90.00 MULTIPLE MYELOMA, REMISSION STATUS UNSPECIFIED: Primary | ICD-10-CM

## 2022-01-20 LAB
ANION GAP SERPL CALC-SCNC: 11 MMOL/L (ref 8–16)
BUN SERPL-MCNC: 12 MG/DL (ref 8–23)
CALCIUM SERPL-MCNC: 9.3 MG/DL (ref 8.7–10.5)
CHLORIDE SERPL-SCNC: 106 MMOL/L (ref 95–110)
CO2 SERPL-SCNC: 26 MMOL/L (ref 23–29)
CREAT SERPL-MCNC: 0.8 MG/DL (ref 0.5–1.4)
EST. GFR  (AFRICAN AMERICAN): >60 ML/MIN/1.73 M^2
EST. GFR  (NON AFRICAN AMERICAN): >60 ML/MIN/1.73 M^2
ESTIMATED AVG GLUCOSE: 186 MG/DL (ref 68–131)
GLUCOSE SERPL-MCNC: 79 MG/DL (ref 70–110)
HBA1C MFR BLD: 8.1 % (ref 4–5.6)
POTASSIUM SERPL-SCNC: 4 MMOL/L (ref 3.5–5.1)
SODIUM SERPL-SCNC: 143 MMOL/L (ref 136–145)

## 2022-01-20 PROCEDURE — 96365 THER/PROPH/DIAG IV INF INIT: CPT | Mod: 59

## 2022-01-20 PROCEDURE — 96367 TX/PROPH/DG ADDL SEQ IV INF: CPT | Mod: HCNC

## 2022-01-20 PROCEDURE — 99999 PR PBB SHADOW E&M-EST. PATIENT-LVL III: ICD-10-PCS | Mod: PBBFAC,HCNC,, | Performed by: INTERNAL MEDICINE

## 2022-01-20 PROCEDURE — 3052F PR MOST RECENT HEMOGLOBIN A1C LEVEL 8.0 - < 9.0%: ICD-10-PCS | Mod: HCNC,CPTII,S$GLB, | Performed by: INTERNAL MEDICINE

## 2022-01-20 PROCEDURE — 83036 HEMOGLOBIN GLYCOSYLATED A1C: CPT | Mod: HCNC | Performed by: INTERNAL MEDICINE

## 2022-01-20 PROCEDURE — 3288F PR FALLS RISK ASSESSMENT DOCUMENTED: ICD-10-PCS | Mod: HCNC,CPTII,S$GLB, | Performed by: INTERNAL MEDICINE

## 2022-01-20 PROCEDURE — 63600175 PHARM REV CODE 636 W HCPCS: Mod: HCNC | Performed by: INTERNAL MEDICINE

## 2022-01-20 PROCEDURE — 99499 RISK ADDL DX/OHS AUDIT: ICD-10-PCS | Mod: S$GLB,,, | Performed by: INTERNAL MEDICINE

## 2022-01-20 PROCEDURE — 3008F BODY MASS INDEX DOCD: CPT | Mod: HCNC,CPTII,S$GLB, | Performed by: INTERNAL MEDICINE

## 2022-01-20 PROCEDURE — 3008F PR BODY MASS INDEX (BMI) DOCUMENTED: ICD-10-PCS | Mod: HCNC,CPTII,S$GLB, | Performed by: INTERNAL MEDICINE

## 2022-01-20 PROCEDURE — 80048 BASIC METABOLIC PNL TOTAL CA: CPT | Mod: HCNC | Performed by: INTERNAL MEDICINE

## 2022-01-20 PROCEDURE — 1101F PR PT FALLS ASSESS DOC 0-1 FALLS W/OUT INJ PAST YR: ICD-10-PCS | Mod: HCNC,CPTII,S$GLB, | Performed by: INTERNAL MEDICINE

## 2022-01-20 PROCEDURE — 3079F DIAST BP 80-89 MM HG: CPT | Mod: HCNC,CPTII,S$GLB, | Performed by: INTERNAL MEDICINE

## 2022-01-20 PROCEDURE — 1126F AMNT PAIN NOTED NONE PRSNT: CPT | Mod: HCNC,CPTII,S$GLB, | Performed by: INTERNAL MEDICINE

## 2022-01-20 PROCEDURE — 3077F SYST BP >= 140 MM HG: CPT | Mod: HCNC,CPTII,S$GLB, | Performed by: INTERNAL MEDICINE

## 2022-01-20 PROCEDURE — 3052F HG A1C>EQUAL 8.0%<EQUAL 9.0%: CPT | Mod: HCNC,CPTII,S$GLB, | Performed by: INTERNAL MEDICINE

## 2022-01-20 PROCEDURE — 25000003 PHARM REV CODE 250: Mod: HCNC | Performed by: INTERNAL MEDICINE

## 2022-01-20 PROCEDURE — 99999 PR PBB SHADOW E&M-EST. PATIENT-LVL III: CPT | Mod: PBBFAC,HCNC,, | Performed by: INTERNAL MEDICINE

## 2022-01-20 PROCEDURE — 1101F PT FALLS ASSESS-DOCD LE1/YR: CPT | Mod: HCNC,CPTII,S$GLB, | Performed by: INTERNAL MEDICINE

## 2022-01-20 PROCEDURE — 1126F PR PAIN SEVERITY QUANTIFIED, NO PAIN PRESENT: ICD-10-PCS | Mod: HCNC,CPTII,S$GLB, | Performed by: INTERNAL MEDICINE

## 2022-01-20 PROCEDURE — 3079F PR MOST RECENT DIASTOLIC BLOOD PRESSURE 80-89 MM HG: ICD-10-PCS | Mod: HCNC,CPTII,S$GLB, | Performed by: INTERNAL MEDICINE

## 2022-01-20 PROCEDURE — 99499 UNLISTED E&M SERVICE: CPT | Mod: S$GLB,,, | Performed by: INTERNAL MEDICINE

## 2022-01-20 PROCEDURE — 3288F FALL RISK ASSESSMENT DOCD: CPT | Mod: HCNC,CPTII,S$GLB, | Performed by: INTERNAL MEDICINE

## 2022-01-20 PROCEDURE — 99214 PR OFFICE/OUTPT VISIT, EST, LEVL IV, 30-39 MIN: ICD-10-PCS | Mod: HCNC,S$GLB,, | Performed by: INTERNAL MEDICINE

## 2022-01-20 PROCEDURE — 36415 COLL VENOUS BLD VENIPUNCTURE: CPT | Mod: HCNC | Performed by: INTERNAL MEDICINE

## 2022-01-20 PROCEDURE — 99214 OFFICE O/P EST MOD 30 MIN: CPT | Mod: HCNC,S$GLB,, | Performed by: INTERNAL MEDICINE

## 2022-01-20 PROCEDURE — 96401 CHEMO ANTI-NEOPL SQ/IM: CPT | Mod: HCNC

## 2022-01-20 PROCEDURE — 3077F PR MOST RECENT SYSTOLIC BLOOD PRESSURE >= 140 MM HG: ICD-10-PCS | Mod: HCNC,CPTII,S$GLB, | Performed by: INTERNAL MEDICINE

## 2022-01-20 RX ORDER — HEPARIN 100 UNIT/ML
500 SYRINGE INTRAVENOUS
Status: DISCONTINUED | OUTPATIENT
Start: 2022-01-20 | End: 2022-01-20 | Stop reason: HOSPADM

## 2022-01-20 RX ORDER — BORTEZOMIB 3.5 MG/1
0.7 INJECTION, POWDER, LYOPHILIZED, FOR SOLUTION INTRAVENOUS; SUBCUTANEOUS
Status: CANCELLED | OUTPATIENT
Start: 2022-01-20

## 2022-01-20 RX ORDER — DIPHENHYDRAMINE HYDROCHLORIDE 50 MG/ML
50 INJECTION INTRAMUSCULAR; INTRAVENOUS ONCE AS NEEDED
Status: DISCONTINUED | OUTPATIENT
Start: 2022-01-20 | End: 2022-01-20 | Stop reason: HOSPADM

## 2022-01-20 RX ORDER — DIPHENHYDRAMINE HYDROCHLORIDE 50 MG/ML
50 INJECTION INTRAMUSCULAR; INTRAVENOUS ONCE AS NEEDED
Status: CANCELLED | OUTPATIENT
Start: 2022-01-20

## 2022-01-20 RX ORDER — BORTEZOMIB 3.5 MG/1
0.7 INJECTION, POWDER, LYOPHILIZED, FOR SOLUTION INTRAVENOUS; SUBCUTANEOUS
Status: COMPLETED | OUTPATIENT
Start: 2022-01-20 | End: 2022-01-20

## 2022-01-20 RX ORDER — ZOLEDRONIC ACID 0.04 MG/ML
4 INJECTION, SOLUTION INTRAVENOUS
Status: CANCELLED | OUTPATIENT
Start: 2022-01-20

## 2022-01-20 RX ORDER — EPINEPHRINE 0.3 MG/.3ML
0.3 INJECTION SUBCUTANEOUS ONCE AS NEEDED
Status: CANCELLED | OUTPATIENT
Start: 2022-02-10

## 2022-01-20 RX ORDER — DIPHENHYDRAMINE HYDROCHLORIDE 50 MG/ML
50 INJECTION INTRAMUSCULAR; INTRAVENOUS ONCE AS NEEDED
Status: CANCELLED | OUTPATIENT
Start: 2022-01-27

## 2022-01-20 RX ORDER — EPINEPHRINE 0.3 MG/.3ML
0.3 INJECTION SUBCUTANEOUS ONCE AS NEEDED
Status: CANCELLED | OUTPATIENT
Start: 2022-01-27

## 2022-01-20 RX ORDER — SODIUM CHLORIDE 0.9 % (FLUSH) 0.9 %
10 SYRINGE (ML) INJECTION
Status: DISCONTINUED | OUTPATIENT
Start: 2022-01-20 | End: 2022-01-20 | Stop reason: HOSPADM

## 2022-01-20 RX ORDER — HEPARIN 100 UNIT/ML
500 SYRINGE INTRAVENOUS
Status: CANCELLED | OUTPATIENT
Start: 2022-01-20

## 2022-01-20 RX ORDER — EPINEPHRINE 0.3 MG/.3ML
0.3 INJECTION SUBCUTANEOUS ONCE AS NEEDED
Status: DISCONTINUED | OUTPATIENT
Start: 2022-01-20 | End: 2022-01-20 | Stop reason: HOSPADM

## 2022-01-20 RX ORDER — EPINEPHRINE 0.3 MG/.3ML
0.3 INJECTION SUBCUTANEOUS ONCE AS NEEDED
Status: CANCELLED | OUTPATIENT
Start: 2022-01-20

## 2022-01-20 RX ORDER — EPINEPHRINE 0.3 MG/.3ML
0.3 INJECTION SUBCUTANEOUS ONCE AS NEEDED
Status: CANCELLED | OUTPATIENT
Start: 2022-02-03

## 2022-01-20 RX ORDER — SODIUM CHLORIDE 0.9 % (FLUSH) 0.9 %
10 SYRINGE (ML) INJECTION
Status: CANCELLED | OUTPATIENT
Start: 2022-01-20

## 2022-01-20 RX ORDER — DIPHENHYDRAMINE HCL 25 MG
25 CAPSULE ORAL
Status: COMPLETED | OUTPATIENT
Start: 2022-01-20 | End: 2022-01-20

## 2022-01-20 RX ORDER — BORTEZOMIB 3.5 MG/1
0.7 INJECTION, POWDER, LYOPHILIZED, FOR SOLUTION INTRAVENOUS; SUBCUTANEOUS
Status: CANCELLED | OUTPATIENT
Start: 2022-01-27

## 2022-01-20 RX ORDER — DIPHENHYDRAMINE HYDROCHLORIDE 50 MG/ML
50 INJECTION INTRAMUSCULAR; INTRAVENOUS ONCE AS NEEDED
Status: CANCELLED | OUTPATIENT
Start: 2022-02-10

## 2022-01-20 RX ORDER — BORTEZOMIB 3.5 MG/1
0.7 INJECTION, POWDER, LYOPHILIZED, FOR SOLUTION INTRAVENOUS; SUBCUTANEOUS
Status: CANCELLED | OUTPATIENT
Start: 2022-02-10

## 2022-01-20 RX ORDER — BORTEZOMIB 3.5 MG/1
0.7 INJECTION, POWDER, LYOPHILIZED, FOR SOLUTION INTRAVENOUS; SUBCUTANEOUS
Status: CANCELLED | OUTPATIENT
Start: 2022-02-03

## 2022-01-20 RX ORDER — ACETAMINOPHEN 325 MG/1
650 TABLET ORAL
Status: COMPLETED | OUTPATIENT
Start: 2022-01-20 | End: 2022-01-20

## 2022-01-20 RX ORDER — DIPHENHYDRAMINE HYDROCHLORIDE 50 MG/ML
50 INJECTION INTRAMUSCULAR; INTRAVENOUS ONCE AS NEEDED
Status: CANCELLED | OUTPATIENT
Start: 2022-02-03

## 2022-01-20 RX ADMIN — DARATUMUMAB AND HYALURONIDASE-FIHJ (HUMAN RECOMBINANT) 1800 MG: 1800; 30000 INJECTION SUBCUTANEOUS at 04:01

## 2022-01-20 RX ADMIN — ZOLEDRONIC ACID 4 MG: 4 INJECTION INTRAVENOUS at 03:01

## 2022-01-20 RX ADMIN — BORTEZOMIB 1.3 MG: 3.5 INJECTION, POWDER, LYOPHILIZED, FOR SOLUTION INTRAVENOUS; SUBCUTANEOUS at 04:01

## 2022-01-20 RX ADMIN — DIPHENHYDRAMINE HYDROCHLORIDE 25 MG: 25 CAPSULE ORAL at 03:01

## 2022-01-20 RX ADMIN — ACETAMINOPHEN 650 MG: 325 TABLET ORAL at 03:01

## 2022-01-20 RX ADMIN — SODIUM CHLORIDE: 9 INJECTION, SOLUTION INTRAVENOUS at 02:01

## 2022-01-20 NOTE — PROGRESS NOTES
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  She achieved and tolerated well VRd x completing 7, 28 day cycles and achieving deep VGPR to induction. Then underwent Melphalan autologous stem cell transplant on 2/12/2020.      Extensive PMH as below.    2 prior CVAs (one in 2008, one in 2011)  L breast cancer DCIS stage 0 (s/p lumpectomy and radiation, no endocrine tx due to CVA)  HTN  DM II  Neuropathy, b/l hands  Prior smoker, quit in 2011  Hepatic lesions - vascular per recent MRI in 09 /2019 with no changes; will confirm no further rascon needed from help  Statin Intolerance - on praluent, PCSK9 inhibitor  HFpEF - EF 55%, diastolic dysfunction  Anxiety/Depression     ADMISSION SUMMARY: 2/10-2/26/2020  Admitted 2/10, tolerated chemo and transplant well. Engrafted on day +12. Remained afebrile throughout hospital stay and no mucositis. Experienced expected side effects of nausea and diarrhea controlled with antiemetics and Imodium. Discharged home with home PT/OT       Subjective:       Initial History: Ms. Thompson is a 71 y.o. female who returns for follow up of multiple myeloma in remission. She is 1 year 11 months post AutoSCT.   On kenia/zhanna/dex after biochemical relapse noted approximately 1 year post transplant. Tolerating and responding well.  Fingertip neuropathy stable.  Memory issues continue; MRI brain and neuro consult pending.  Accompanied by her friend.    Today is C7D1 kenia/zhanna/dex.    Otherwise no signs symptoms of kenia intolerance or myeloma progression.            Past Medical History:   Past Medical History:    Diagnosis Date    Acute respiratory failure with hypoxia 4/30/2019    FUENTES (acute kidney injury) 5/1/2019    Allergy     Anemia     Aortic aneurysm     Breast cancer 10/2011    left breast Stage 0 DCIS    Cataract     Chronic diastolic congestive heart failure 11/6/2015    Colon polyp     Community acquired pneumonia of right lower lobe of lung 8/3/2021    GERD (gastroesophageal reflux disease)     Hiatal hernia     History of colonic polyps     Hx of psychiatric care     Zoloft    HX: breast cancer     Hyperlipemia     Hypertension     ICH (intracerebral hemorrhage)     Major depressive disorder, single episode, mild 6/23/2016    Nuclear sclerosis 7/21/2014    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 7/21/2014    PAD (peripheral artery disease) 11/6/2015    Psychiatric problem     Statin-induced rhabdomyolysis     4/2015     Stroke 4/2011    Type 2 diabetes mellitus with diabetic peripheral angiopathy without gangrene, with long-term current use of insulin 3/31/2021    Type 2 diabetes mellitus without complication, without long-term current use of insulin 2/10/2020    Walker as ambulation aid        Past Surgical HIstory:   Past Surgical History:   Procedure Laterality Date    APPENDECTOMY      BONE MARROW BIOPSY Left 10/3/2019    Procedure: Biopsy-bone marrow;  Surgeon: Jere Tineo MD;  Location: [facility] OR [address] [place] [address];  Service: Oncology;  Laterality: Left;    BREAST BIOPSY Left 10/2011    BREAST LUMPECTOMY Left 2011    DCIS    COLONOSCOPY      COLONOSCOPY N/A 1/9/2020    Procedure: COLONOSCOPY;  Surgeon: Quang De La Cruz MD;  Location: [facility] (4TH FLR);  Service: Endoscopy;  Laterality: N/A;    CYST REMOVAL      back    ESOPHAGOGASTRODUODENOSCOPY  07/2016    duodenal angioectasia    ESOPHAGOGASTRODUODENOSCOPY N/A 1/9/2020    Procedure: EGD (ESOPHAGOGASTRODUODENOSCOPY);  Surgeon: Quang De La Cruz MD;  Location: [facility] (4TH FLR);  Service:  Endoscopy;  Laterality: N/A;    FOOT FRACTURE SURGERY  10/2012    right foot, with R hallux valgus repair    FRACTURE SURGERY Right     broken toe repiar    HEMORRHOID SURGERY      LIVER BIOPSY  04/2015    essentially normal, no fibrosis    TUBAL LIGATION      UPPER GASTROINTESTINAL ENDOSCOPY         Family History:   Family History   Problem Relation Age of Onset    Dementia Sister         x1 sister    Cancer Sister 63        Lung Cancer    No Known Problems Mother     Cancer Father     No Known Problems Brother     Hypertension Daughter     Fibroids Daughter         uterine    Hypertension Son     No Known Problems Brother     No Known Problems Maternal Aunt     No Known Problems Maternal Uncle     No Known Problems Paternal Aunt     No Known Problems Paternal Uncle     Hypertension Maternal Grandmother     No Known Problems Maternal Grandfather     No Known Problems Paternal Grandmother     No Known Problems Paternal Grandfather     Ovarian cancer Neg Hx     Colon cancer Neg Hx     Tremor Neg Hx     Amblyopia Neg Hx     Blindness Neg Hx     Cataracts Neg Hx     Diabetes Neg Hx     Glaucoma Neg Hx     Macular degeneration Neg Hx     Retinal detachment Neg Hx     Strabismus Neg Hx     Stroke Neg Hx     Thyroid disease Neg Hx     Esophageal cancer Neg Hx     Rectal cancer Neg Hx     Stomach cancer Neg Hx     Ulcerative colitis Neg Hx     Crohn's disease Neg Hx     Irritable bowel syndrome Neg Hx     Celiac disease Neg Hx        Social History:  reports that she quit smoking about 10 years ago. Her smoking use included cigarettes. She started smoking about 55 years ago. She has a 11.00 pack-year smoking history. She has never used smokeless tobacco. She reports previous alcohol use. She reports that she does not use drugs.    Allergies:  Review of patient's allergies indicates:   Allergen Reactions    Lipitor [atorvastatin] Itching     Itching, elevated cpk, muscle aches,  statin induced myositis       Medications:  Current Outpatient Medications   Medication Sig Dispense Refill    acyclovir (ZOVIRAX) 400 MG tablet Take by mouth 2 (two) times daily.      albuterol (ACCUNEB) 1.25 mg/3 mL Nebu Take 3 mLs (1.25 mg total) by nebulization every 6 (six) hours as needed (wheeze/cough). Rescue 75 mL 2    albuterol (PROVENTIL HFA) 90 mcg/actuation inhaler Inhale 2 puffs into the lungs every 6 (six) hours as needed for Wheezing. Rescue 6.7 g 0    alirocumab (PRALUENT PEN) 75 mg/mL PnIj Inject 1 mL (75 mg total) into the skin every 14 (fourteen) days. 6 mL 3    amLODIPine (NORVASC) 5 MG tablet TAKE 1 TABLET BY MOUTH EVERY DAY 90 tablet 3    aspirin (ECOTRIN) 81 MG EC tablet Take 1 tablet (81 mg total) by mouth once daily. 90 tablet 3    benzonatate (TESSALON) 100 MG capsule Take 1 capsule (100 mg total) by mouth 3 (three) times daily as needed for Cough. 30 capsule 0    ENGERIX-B, PF, 20 mcg/mL Syrg       gabapentin (NEURONTIN) 300 MG capsule Take 2 capsules (600 mg total) by mouth 2 (two) times daily. 180 capsule 2    guaiFENesin (MUCINEX) 600 mg 12 hr tablet Take 1 tablet (600 mg total) by mouth 2 (two) times daily. 30 tablet 0    metFORMIN (GLUCOPHAGE) 1000 MG tablet Take 1 tablet (1,000 mg total) by mouth 2 (two) times daily with meals. 180 tablet 3    omeprazole (PRILOSEC) 40 MG capsule TAKE 1 CAPSULE BY MOUTH EVERY DAY 90 capsule 3    ondansetron (ZOFRAN-ODT) 4 MG TbDL Take 1 tablet (4 mg total) by mouth every 6 (six) hours as needed (nausea). (Patient not taking: No sig reported) 12 tablet 0    potassium chloride SA (K-DUR,KLOR-CON) 20 MEQ tablet TAKE 1 TABLET BY MOUTH ONCE DAILY 90 tablet 3    traMADoL (ULTRAM) 50 mg tablet Take 1 tablet (50 mg total) by mouth 2 (two) times daily as needed for Pain. 60 tablet 3    VELCADE 3.5 mg injection       zoledronic 4 mg/100 mL PgBk infusion        No current facility-administered medications for this visit.       Review of Systems    Constitutional: Positive for fatigue. Negative for appetite change, chills and fever.   HENT: Negative for ear discharge, ear pain, nosebleeds, postnasal drip, rhinorrhea, sinus pressure and sore throat.    Eyes: Negative for pain.   Respiratory: Negative for chest tightness and shortness of breath.    Cardiovascular: Negative for chest pain, palpitations and leg swelling.   Gastrointestinal: Negative for abdominal distention, abdominal pain, blood in stool, constipation, diarrhea, nausea and vomiting.   Genitourinary: Negative.  Negative for dysuria and hematuria.   Musculoskeletal: Negative.  Negative for back pain, gait problem and myalgias.   Skin: Negative.    Neurological: Positive for tremors and numbness. Negative for syncope and headaches.   Hematological: Negative for adenopathy. Does not bruise/bleed easily.   Psychiatric/Behavioral: The patient is not nervous/anxious.         Memory issues       ECOG Performance Status: 2 (due to remote  CVA)   Objective:      Vitals:   Vitals:    01/20/22 1332   BP: (!) 150/81   Pulse: 67   Resp: 16   Temp: 97.8 °F (36.6 °C)        PE:   General -in wheelchair  no apparent distress  HEENT - oropharynx clear  Chest and Lung -bilateral end expiratory wheezes  Cardiovascular - RRR with no MGR, normal S1 and S2  Abdomen-  soft, nontender, no palpable hepatomegaly or splenomegaly  Lymph - no palpable lymphadenopathy  Heme - no bruising, petechiae, pallor  Skin - no rashes or lesions  Psych - appropriate mood and affect  Neuro -  No change in chronic residual CVA symptoms    Laboratory Data:  Lab Visit on 01/20/2022   Component Date Value Ref Range Status    Hemoglobin A1C 01/20/2022 8.1* 4.0 - 5.6 % Final    Comment: ADA Screening Guidelines:  5.7-6.4%  Consistent with prediabetes  >or=6.5%  Consistent with diabetes    High levels of fetal hemoglobin interfere with the HbA1C  assay. Heterozygous hemoglobin variants (HbS, HgC, etc)do  not significantly interfere with this  assay.   However, presence of multiple variants may affect accuracy.      Estimated Avg Glucose 01/20/2022 186* 68 - 131 mg/dL Final    Sodium 01/20/2022 143  136 - 145 mmol/L Final    Potassium 01/20/2022 4.0  3.5 - 5.1 mmol/L Final    Chloride 01/20/2022 106  95 - 110 mmol/L Final    CO2 01/20/2022 26  23 - 29 mmol/L Final    Glucose 01/20/2022 79  70 - 110 mg/dL Final    BUN 01/20/2022 12  8 - 23 mg/dL Final    Creatinine 01/20/2022 0.8  0.5 - 1.4 mg/dL Final    Calcium 01/20/2022 9.3  8.7 - 10.5 mg/dL Final    Anion Gap 01/20/2022 11  8 - 16 mmol/L Final    eGFR if African American 01/20/2022 >60.0  >60 mL/min/1.73 m^2 Final    eGFR if non African American 01/20/2022 >60.0  >60 mL/min/1.73 m^2 Final    Comment: Calculation used to obtain the estimated glomerular filtration  rate (eGFR) is the CKD-EPI equation.         Lab Results   Component Value Date    WBC 3.94 01/13/2022    HGB 10.3 (L) 01/13/2022    HCT 33.6 (L) 01/13/2022    MCV 81 (L) 01/13/2022     01/13/2022       Gran # (ANC)   Date Value Ref Range Status   01/13/2022 2.2 1.8 - 7.7 K/uL Final     Gran %   Date Value Ref Range Status   01/13/2022 56.4 38.0 - 73.0 % Final     Kappa Free Light Chains   Date Value Ref Range Status   01/13/2022 3.77 (H) 0.33 - 1.94 mg/dL Final   12/16/2021 4.66 (H) 0.33 - 1.94 mg/dL Final   11/15/2021 1.20 0.33 - 1.94 mg/dL Final     Lambda Free Light Chains   Date Value Ref Range Status   01/13/2022 2.45 0.57 - 2.63 mg/dL Final   12/16/2021 1.57 0.57 - 2.63 mg/dL Final   11/15/2021 0.27 (L) 0.57 - 2.63 mg/dL Final     Kappa/Lambda FLC Ratio   Date Value Ref Range Status   01/13/2022 1.54 0.26 - 1.65 Final     Comment:     Undetected antigen excess is a rare event but cannot   be excluded. If these free light chain results do not   agree with other clinical or laboratory findings or   if the sample is from a patient that has previously   demonstrated antigen excess, discuss with the testing   laboratory.    Results should always be interpreted in conjunction   with other laboratory tests and clinical evidence.     12/16/2021 2.97 (H) 0.26 - 1.65 Final     Comment:     Undetected antigen excess is a rare event but cannot   be excluded. If these free light chain results do not   agree with other clinical or laboratory findings or   if the sample is from a patient that has previously   demonstrated antigen excess, discuss with the testing   laboratory.   Results should always be interpreted in conjunction   with other laboratory tests and clinical evidence.     11/15/2021 4.44 (H) 0.26 - 1.65 Final     Comment:     Undetected antigen excess is a rare event but cannot   be excluded. If these free light chain results do not   agree with other clinical or laboratory findings or   if the sample is from a patient that has previously   demonstrated antigen excess, discuss with the testing   laboratory.   Results should always be interpreted in conjunction   with other laboratory tests and clinical evidence.       IgG   Date Value Ref Range Status   01/13/2022 879 650 - 1600 mg/dL Final     Comment:     IgG Cord Blood Reference Range: 650-1600 mg/dL.     IgA   Date Value Ref Range Status   01/13/2022 73 40 - 350 mg/dL Final     Comment:     IgA Cord Blood Reference Range: <5 mg/dL.     IgM   Date Value Ref Range Status   01/13/2022 117 50 - 300 mg/dL Final     Comment:     IgM Cord Blood Reference Range: <25 mg/dL.   Pathologist Interpretation SPE  Pathologist Interpretation SPE  Collected: 01/13/22 1121   Result status: Final   Resulting lab: [facility]   Value: REVIEWED   Comment:   Electronically reviewed and signed by:   Destiny Lopes M.D.   Signed on 01/18/22 at 14:00   Normal total protein.   Normal gamma globulins are decreased.   Two closely adjacent paraprotein bands:   1) Near-gamma = 0.22 g/dL (previously 0.19 g/dL).   2) Mid-gamma = 0.17 g/dL (previously, 0.15 g/dL).    *Additional  "information available - comment           Imaging:      Assessment:       Ms. Thompson is a 71 year old female with relapsed multiple myeloma.     Plan:     MM s/p Auto SCT  - high risk MM with 17p deletion  - 1 year 11  month s/p Auto SCT  - started maintenance q2w velcade 5/6/20  - serial biochemical relapse  Noted and  given this and high risk CG    transitioned to weekly Sravanthi/Noble (1mg/m2) /dex  Salvage to which she is tolerating and responding to well as of 1/20/22 biochemical studies   -velcade decreased to 1mg/m2 for grade 1 neuropathy which is stable  -supportive meds:   continue ppx acyclovir, asa; resume maintenance zometa q 3 months for 1 year; dose today; next due mid April 2022    -post transplant vaccines completed     Cytopenia due to chemotherapy  - stable mild  -due to therapy monitor      Neuropathy  -Stable   - continues gabapentin and prn tramadol  -dose reduced velcade further to 0.7mg/m2 moving forward (12/16/21)     SOB/descending aortic aneurysm  - CTA and dopplers ordered urgently with high concern for DVT/PE; completed 8/3/20  - incidental descending thoracic aortic aneurysm noted; referred to thoracic surgery; seen 8/7/20; per note: "at current size would not recommend any intervention as risk of rupture remains low.  Recommend surveillance CT chest every 12 months to monitor growth of the aneurysm.  Would typically recommend aspirin 81 mg daily in patients with aortic aneurysm  - ASA started (9/14/20)     HTN  - continue norvasc  - Patient to monitor BP closely  Anxiety/depression  - continue zoloft    DM2  - diet controlled    Hx of CVA  - s/p 2008, 2011    HLD with Statin Intolerance  -on praluent, PCSK9 inhibitor    L breast cancer DCIS stage 0  -s/p lumpectomy and radiation, no endocrine tx due to CVA    Coarse tremors/memory issues  - Newly develped tremors   - Short term memory issues worsened  -repeat MRI brain and refer to neuro         MPV pna  -resolved            Follow up:   "   -pleaase schedule MRI brain followed by neurology referral  with appt time; pt has memory issues and didn't remember neurology clinic calling her to schedule the appt in dec  -velcade injection on 1/27, 2/3, 2/10, 2/17  -kenia/velcade injection on 2/17  -cbc, cmp, serum free light chains, quantitative immunoglobulins, serum electropheresis, serum immunofixation and np appt prior to treatment on 2/17

## 2022-01-20 NOTE — PLAN OF CARE
Pt admitted for C2D1 Velcade/Sravanthi SQ, and Zometa. (NOTE- Pt does not receive Dexamethasone 20 mg as pre-med, due to effect on blood sugar)  Labs reviewed and fatigue addressed. Pt tolerated  treatment well. Plan of care reviewed and PIV removed and Pt discharged in W/C @ 16:20

## 2022-01-20 NOTE — Clinical Note
-please schedule MRI brain followed by neurology referral (please call friend - [phone]  -Harriet Josh) with appt time; pt has memory issues and didn't remember neurology clinic calling her to schedule the appt in dec -velcade injection on 1/27, 2/3, 2/10, 2/17 -kenia/velcade injection on 2/17 -cbc, cmp, serum free light chains, quantitative immunoglobulins, serum electropheresis, serum immunofixation and np appt prior to treatment on 2/17

## 2022-01-24 NOTE — PLAN OF CARE
Problem: Adult Inpatient Plan of Care  Goal: Plan of Care Review  Outcome: Ongoing (interventions implemented as appropriate)  Patient remained free from falls/injury/trauma throughout shift. No complaints of chest pain or SOB. VSS. Fall risk precautions reviewed and maintained. Plan of care reviewed with patient. Patient verbalized understanding. All questions encouraged and answered. Will continue to monitor.        Re: Aníbal ESTEBAN approval effective 01/19/22-01/19/23  Scanned letter to chart.

## 2022-01-27 ENCOUNTER — OFFICE VISIT (OUTPATIENT)
Dept: NEUROLOGY | Facility: CLINIC | Age: 72
End: 2022-01-27
Payer: MEDICARE

## 2022-01-27 ENCOUNTER — TELEPHONE (OUTPATIENT)
Dept: NEUROLOGY | Facility: CLINIC | Age: 72
End: 2022-01-27
Payer: MEDICARE

## 2022-01-27 ENCOUNTER — INFUSION (OUTPATIENT)
Dept: INFUSION THERAPY | Facility: HOSPITAL | Age: 72
End: 2022-01-27
Payer: MEDICARE

## 2022-01-27 VITALS
DIASTOLIC BLOOD PRESSURE: 89 MMHG | SYSTOLIC BLOOD PRESSURE: 138 MMHG | HEART RATE: 70 BPM | HEIGHT: 63 IN | BODY MASS INDEX: 26.99 KG/M2 | WEIGHT: 152.31 LBS

## 2022-01-27 DIAGNOSIS — R41.3 MEMORY LOSS: ICD-10-CM

## 2022-01-27 DIAGNOSIS — C90.02 MULTIPLE MYELOMA IN RELAPSE: ICD-10-CM

## 2022-01-27 DIAGNOSIS — R25.1 TREMOR: Primary | ICD-10-CM

## 2022-01-27 DIAGNOSIS — C90.01 MULTIPLE MYELOMA IN REMISSION: Primary | ICD-10-CM

## 2022-01-27 PROCEDURE — 3079F DIAST BP 80-89 MM HG: CPT | Mod: HCNC,CPTII,S$GLB, | Performed by: PSYCHIATRY & NEUROLOGY

## 2022-01-27 PROCEDURE — 96401 CHEMO ANTI-NEOPL SQ/IM: CPT | Mod: HCNC

## 2022-01-27 PROCEDURE — 99204 OFFICE O/P NEW MOD 45 MIN: CPT | Mod: HCNC,S$GLB,, | Performed by: PSYCHIATRY & NEUROLOGY

## 2022-01-27 PROCEDURE — 99999 PR PBB SHADOW E&M-EST. PATIENT-LVL III: ICD-10-PCS | Mod: PBBFAC,HCNC,, | Performed by: PSYCHIATRY & NEUROLOGY

## 2022-01-27 PROCEDURE — 1159F PR MEDICATION LIST DOCUMENTED IN MEDICAL RECORD: ICD-10-PCS | Mod: HCNC,CPTII,S$GLB, | Performed by: PSYCHIATRY & NEUROLOGY

## 2022-01-27 PROCEDURE — 3008F BODY MASS INDEX DOCD: CPT | Mod: HCNC,CPTII,S$GLB, | Performed by: PSYCHIATRY & NEUROLOGY

## 2022-01-27 PROCEDURE — 3288F FALL RISK ASSESSMENT DOCD: CPT | Mod: HCNC,CPTII,S$GLB, | Performed by: PSYCHIATRY & NEUROLOGY

## 2022-01-27 PROCEDURE — 3079F PR MOST RECENT DIASTOLIC BLOOD PRESSURE 80-89 MM HG: ICD-10-PCS | Mod: HCNC,CPTII,S$GLB, | Performed by: PSYCHIATRY & NEUROLOGY

## 2022-01-27 PROCEDURE — 1126F PR PAIN SEVERITY QUANTIFIED, NO PAIN PRESENT: ICD-10-PCS | Mod: HCNC,CPTII,S$GLB, | Performed by: PSYCHIATRY & NEUROLOGY

## 2022-01-27 PROCEDURE — 99204 PR OFFICE/OUTPT VISIT, NEW, LEVL IV, 45-59 MIN: ICD-10-PCS | Mod: HCNC,S$GLB,, | Performed by: PSYCHIATRY & NEUROLOGY

## 2022-01-27 PROCEDURE — 1101F PT FALLS ASSESS-DOCD LE1/YR: CPT | Mod: HCNC,CPTII,S$GLB, | Performed by: PSYCHIATRY & NEUROLOGY

## 2022-01-27 PROCEDURE — 99499 UNLISTED E&M SERVICE: CPT | Mod: S$GLB,,, | Performed by: PSYCHIATRY & NEUROLOGY

## 2022-01-27 PROCEDURE — 3288F PR FALLS RISK ASSESSMENT DOCUMENTED: ICD-10-PCS | Mod: HCNC,CPTII,S$GLB, | Performed by: PSYCHIATRY & NEUROLOGY

## 2022-01-27 PROCEDURE — 3052F HG A1C>EQUAL 8.0%<EQUAL 9.0%: CPT | Mod: HCNC,CPTII,S$GLB, | Performed by: PSYCHIATRY & NEUROLOGY

## 2022-01-27 PROCEDURE — 1160F PR REVIEW ALL MEDS BY PRESCRIBER/CLIN PHARMACIST DOCUMENTED: ICD-10-PCS | Mod: HCNC,CPTII,S$GLB, | Performed by: PSYCHIATRY & NEUROLOGY

## 2022-01-27 PROCEDURE — 1159F MED LIST DOCD IN RCRD: CPT | Mod: HCNC,CPTII,S$GLB, | Performed by: PSYCHIATRY & NEUROLOGY

## 2022-01-27 PROCEDURE — 63600175 PHARM REV CODE 636 W HCPCS: Mod: JG,HCNC | Performed by: INTERNAL MEDICINE

## 2022-01-27 PROCEDURE — 1101F PR PT FALLS ASSESS DOC 0-1 FALLS W/OUT INJ PAST YR: ICD-10-PCS | Mod: HCNC,CPTII,S$GLB, | Performed by: PSYCHIATRY & NEUROLOGY

## 2022-01-27 PROCEDURE — 3075F PR MOST RECENT SYSTOLIC BLOOD PRESS GE 130-139MM HG: ICD-10-PCS | Mod: HCNC,CPTII,S$GLB, | Performed by: PSYCHIATRY & NEUROLOGY

## 2022-01-27 PROCEDURE — 3008F PR BODY MASS INDEX (BMI) DOCUMENTED: ICD-10-PCS | Mod: HCNC,CPTII,S$GLB, | Performed by: PSYCHIATRY & NEUROLOGY

## 2022-01-27 PROCEDURE — 99999 PR PBB SHADOW E&M-EST. PATIENT-LVL III: CPT | Mod: PBBFAC,HCNC,, | Performed by: PSYCHIATRY & NEUROLOGY

## 2022-01-27 PROCEDURE — 99499 RISK ADDL DX/OHS AUDIT: ICD-10-PCS | Mod: S$GLB,,, | Performed by: PSYCHIATRY & NEUROLOGY

## 2022-01-27 PROCEDURE — 3075F SYST BP GE 130 - 139MM HG: CPT | Mod: HCNC,CPTII,S$GLB, | Performed by: PSYCHIATRY & NEUROLOGY

## 2022-01-27 PROCEDURE — 1126F AMNT PAIN NOTED NONE PRSNT: CPT | Mod: HCNC,CPTII,S$GLB, | Performed by: PSYCHIATRY & NEUROLOGY

## 2022-01-27 PROCEDURE — 3052F PR MOST RECENT HEMOGLOBIN A1C LEVEL 8.0 - < 9.0%: ICD-10-PCS | Mod: HCNC,CPTII,S$GLB, | Performed by: PSYCHIATRY & NEUROLOGY

## 2022-01-27 PROCEDURE — 1160F RVW MEDS BY RX/DR IN RCRD: CPT | Mod: HCNC,CPTII,S$GLB, | Performed by: PSYCHIATRY & NEUROLOGY

## 2022-01-27 RX ORDER — BORTEZOMIB 3.5 MG/1
0.7 INJECTION, POWDER, LYOPHILIZED, FOR SOLUTION INTRAVENOUS; SUBCUTANEOUS
Status: COMPLETED | OUTPATIENT
Start: 2022-01-27 | End: 2022-01-27

## 2022-01-27 RX ORDER — DIPHENHYDRAMINE HYDROCHLORIDE 50 MG/ML
50 INJECTION INTRAMUSCULAR; INTRAVENOUS ONCE AS NEEDED
Status: DISCONTINUED | OUTPATIENT
Start: 2022-01-27 | End: 2022-01-27 | Stop reason: HOSPADM

## 2022-01-27 RX ORDER — EPINEPHRINE 0.3 MG/.3ML
0.3 INJECTION SUBCUTANEOUS ONCE AS NEEDED
Status: DISCONTINUED | OUTPATIENT
Start: 2022-01-27 | End: 2022-01-27 | Stop reason: HOSPADM

## 2022-01-27 RX ADMIN — BORTEZOMIB 1.3 MG: 3.5 INJECTION, POWDER, LYOPHILIZED, FOR SOLUTION INTRAVENOUS; SUBCUTANEOUS at 12:01

## 2022-01-27 NOTE — PROGRESS NOTES
Subjective:       Patient ID: Shelby Thompson is a 71 y.o. female.    Chief Complaint:  Memory Loss      History of Present Illness  HPI  This 71 year old woman, now under treatment for multiple myeloma (she had a prior bone marrow transplant, and now received chemotherapy) , has presented to the Clinic with a four year history of progressive problems with short term memory.  She states that she may forget objects at home, or have difficulty tracking appointments.  However, she also notes that she may lose her place in a conversation--forget what she was discussing,  The problems began two years before the patient's diagnosis of multiple myeloma.    The patient was accompanied to the examination by a friend.  Her friend pointed out that while that patient was walking with her, two or three months ago, she pointed out the presence of a dog in their path.  There was no dog present, in reality.  The patient is uncertain as to details of the event.  This finding has not recurred. However, it is noted that both velcade (for multiple myeloma) and alirocumab (for lowering of cholesterol) may promote hallucinations.     Noted in the patients history, and under consideration as an etiology for memory issues, is that of a history of stroke.  The patient has a history of hemorrhagic stroke in 2008 (in the region of the thalamus), and a history of hemorrhagic stroke in 2011 (in the region of the left posterior frontal lobe).  Residual effects of these stroke events have included dysarthria (mild),, and right sided weakness.  The patient notes that over time, she has developed difficulty with walking distances due to strength and due to balance.  In the last year, the patient reports a tremor at the right arm that has made it difficult for her to write.  While she has been seen by neurologists in the past, records here do not indicate neurological followup in the [facility] system.     MRI scans of the brain were reviewed from  4-22-13 and 4-30-19.  They reveal bilateral basal ganglia stroke, and thalamic stroke.  The earlier study shows additional evidence of prior hemorrhage at the right posterior inferior temporal lobe and the left occipital lobe.  An MRI scan of the brain, without and with contrast, is pending.     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 The patient takes gabapentin for symptoms of discomfort with peripheral neuropathy.    The patient denies any recent stroke events, or TIA symptomatology.     Review of Systems  Review of Systems   Constitutional: Negative for activity change and appetite change.   HENT: Negative for congestion and sinus pain.    Eyes: Negative for discharge and itching.   Respiratory: Negative for chest tightness and shortness of breath.    Cardiovascular: Negative for chest pain and palpitations.   Gastrointestinal: Negative for abdominal pain and vomiting.   Endocrine: Negative for cold intolerance and heat intolerance.   Genitourinary: Negative for flank pain.   Musculoskeletal: Positive for gait problem. Negative for back pain and neck stiffness.   Skin: Negative for pallor and rash.   Allergic/Immunologic: Negative for environmental allergies and food allergies.   Neurological: Positive for tremors, speech difficulty and weakness. Negative for dizziness, seizures and headaches.   Hematological: Negative for adenopathy. Does not bruise/bleed easily.   Psychiatric/Behavioral: Positive for decreased concentration. Negative for confusion.       Objective:   The patient was seated in a wheelchair at the time of my exam.  She reports that she uses a walker at home. She was accompanied to the exam by a friend.   Neurologic Exam   Mental status: The patient is alert and oriented to person, place, and time.  I did not note any primary disorder of language.  The patient did  not appear to lose her train of thought in conversation.  A Mini-Mental state examination was administered. The patient scored at 30/30.  Summary: Forgetfulness as described by the patient.   Cranial nerves II-XII: Normal, including extraocular movements.  Motor: Mild right sided weakness is described by the patient.  On my exam, I note mild weakness with drift at the right leg (5-/5).  Motor power is normal on the left side. Reflexes are 2 at the right upper extremity, and 1 at the left upper extremity.  Knee jerks are 1.  Ankle jerks are absent.  Sensory: No decreased sensation to light touch in the upper extremities; the patient reports peripheral neuropathy at the feet.  Cerebellar: Noted is coarse tremor, with an intention component, at the right arm.  At times, the movement appears to be choreo-athetotic.  At the right leg, there is occasional tremor noted.  Gait: Broad based and unstable.   Extra-pyramidal: Tremor, with athetosis, is noted at the right arm.  This creates difficulty in the patient's attempts to write.      Physical Exam    HEENT: No evidence of trauma  Lungs: Clear to P&A  Heart: S1 and S2, without murmurs  Abdomen: No tenderness  Extremities: No evidence of trauma  Derm: No rashes or masses  Spine: No paraspinal tenderness          Assessment:        1. Memory loss    There are multiple possible causes for the patient's difficulties with short term memory.  The patient has had prior hemorrhagic stroke, as well as lacunar infarctions.  Medications that the patient has taken may also influence memory function.  Noted on my exam was no severe impairment of memory, and her descriptions would be in keeping with mild cognitive impairment.  The issue of possible visual hallucinations (reported by her friend, but that the patient has denied) may have represented medication effect from chemotherapeutic agents.    2. Prior hemorrhagic strokes at left posterior frontal lobe and left thalamus:  In terms  of stroke preventive regimen, the patient is noted to be intolerant of standard statins (instead, she takes alirocumab).. She is presently taking aspirin and an antihypertensive  3. Tremor: Most likely as late effect of stroke, although Parkinson's disease is also a possibility  4. Type II diabetes: Diagnosed 6 months ago  5. Hypertension: On medications at present time.   6. Chemotherapy for multiple myeloma    Plan:      1. Serum B12, folate, and TSH: When bloods drawn again (need not be done today)  2. MRI scan of the brain with and without contrast: Scheduled on 2-3-2022  3. Appointment to Neuropsychiatry  4. Appointment to [facility]  5. Appointment to Dr. Drummond, Memory Clinic, in 4-6 weeks    Regarding messages to the patient:  The patient elects that her  be contacted on his phone.

## 2022-01-27 NOTE — TELEPHONE ENCOUNTER
----- Message from Vilma Lund MA sent at 1/27/2022 11:08 AM CST -----  Regarding: Appointment Needed  An order/referral has been placed for this patient for:    Tremor [R25.1] Per     Please assist patient in scheduling. The patient has been informed that someone from this department will give a call to assist in scheduling.    Stay Well,  Vilma.

## 2022-01-28 NOTE — TELEPHONE ENCOUNTER
Spoke to patient appointment placed on hold for 2/22/22 for 10am. [email or web address] Appointment reminder will be sent to patient home address on file.

## 2022-01-31 ENCOUNTER — TELEPHONE (OUTPATIENT)
Dept: CARDIOLOGY | Facility: CLINIC | Age: 72
End: 2022-01-31
Payer: MEDICARE

## 2022-01-31 NOTE — TELEPHONE ENCOUNTER
----- Message from Pranay Rubi PharmD sent at 1/31/2022 12:36 PM CST -----  Regarding: RE: Praluent    ----- Message -----  From: Nikia Purcell PharmD  Sent: 1/31/2022  11:39 AM CST  To: Pranay Rubi PharmD  Subject: RE: Praluent                                     Good morning, we were able to renew the noreen for the pt, the program application can be disregarded. Thank you    ----- Message -----  From: Pranay Rubi PharmD  Sent: 1/31/2022  10:57 AM CST  To: Nikia Purcell PharmD  Subject: FW: Praluent                                       ----- Message -----  From: Liz Ford RN  Sent: 1/31/2022  10:55 AM CST  To: Specialty Clinical Pharmacists  Subject: FW: Praluent                                       ----- Message -----  From: Lynn Pineda  Sent: 1/31/2022  10:44 AM CST  To: Liz Frod RN  Subject: Praluent                                         The praluent program called stating they are missing the pt consent page.  Would you please refax this packet.          Thanks

## 2022-02-02 ENCOUNTER — SPECIALTY PHARMACY (OUTPATIENT)
Dept: PHARMACY | Facility: CLINIC | Age: 72
End: 2022-02-02
Payer: MEDICARE

## 2022-02-02 NOTE — TELEPHONE ENCOUNTER
Praluent PA submitted and approved via [facility]. $0.00 copayment. PA Case: [id number], Status: Approved, Coverage Starts on: 1/1/2022 12:00:00 AM, Coverage Ends on: 12/31/2022 12:00:00 AM. Questions? Contact [phone].      Specialty Pharmacy - Refill Coordination  Specialty Pharmacy - Medication/Referral Authorization    Specialty Medication Orders Linked to Encounter    Flowsheet Row Most Recent Value   Medication #1 alirocumab (PRALUENT PEN) 75 mg/mL PnIj (Order#[id number], Rx#[id number])          Refill Questions - Documented Responses    Flowsheet Row Most Recent Value   Patient Availability and HIPAA Verification    Does patient want to proceed with activity? Yes   HIPAA/medical authority confirmed? Yes   Relationship to patient of person spoken to? Self   Refill Screening Questions    Would patient like to speak to a pharmacist? No   When does the patient need to receive the medication? 02/11/22   Refill Delivery Questions    How will the patient receive the medication? Delivery Laura   When does the patient need to receive the medication? 02/11/22   Shipping Address Home   Address in [facility] confirmed and updated if neccessary? Yes   Expected Copay ($) 0   Is the patient able to afford the medication copay? Yes   Payment Method zero copay   Days supply of Refill 28   Supplies needed? No supplies needed   Refill activity completed? Yes   Refill activity plan Refill scheduled   Shipment/Pickup Date: 02/08/22          Current Outpatient Medications   Medication Sig    acyclovir (ZOVIRAX) 400 MG tablet Take by mouth 2 (two) times daily.    albuterol (ACCUNEB) 1.25 mg/3 mL Nebu Take 3 mLs (1.25 mg total) by nebulization every 6 (six) hours as needed (wheeze/cough). Rescue    albuterol (PROVENTIL HFA) 90 mcg/actuation inhaler Inhale 2 puffs into the lungs every 6 (six) hours as needed for Wheezing. Rescue    alirocumab (PRALUENT PEN) 75 mg/mL PnIj Inject 1 mL (75 mg total) into the skin every 14  (fourteen) days.    amLODIPine (NORVASC) 5 MG tablet TAKE 1 TABLET BY MOUTH EVERY DAY    aspirin (ECOTRIN) 81 MG EC tablet Take 1 tablet (81 mg total) by mouth once daily.    benzonatate (TESSALON) 100 MG capsule Take 1 capsule (100 mg total) by mouth 3 (three) times daily as needed for Cough.    ENGERIX-B, PF, 20 mcg/mL Syrg     gabapentin (NEURONTIN) 300 MG capsule Take 2 capsules (600 mg total) by mouth 2 (two) times daily.    guaiFENesin (MUCINEX) 600 mg 12 hr tablet Take 1 tablet (600 mg total) by mouth 2 (two) times daily.    metFORMIN (GLUCOPHAGE) 1000 MG tablet Take 1 tablet (1,000 mg total) by mouth 2 (two) times daily with meals.    omeprazole (PRILOSEC) 40 MG capsule TAKE 1 CAPSULE BY MOUTH EVERY DAY    ondansetron (ZOFRAN-ODT) 4 MG TbDL Take 1 tablet (4 mg total) by mouth every 6 (six) hours as needed (nausea).    potassium chloride SA (K-DUR,KLOR-CON) 20 MEQ tablet TAKE 1 TABLET BY MOUTH ONCE DAILY    traMADoL (ULTRAM) 50 mg tablet Take 1 tablet (50 mg total) by mouth 2 (two) times daily as needed for Pain.    VELCADE 3.5 mg injection     zoledronic 4 mg/100 mL PgBk infusion    Last reviewed on 1/28/2022 12:12 PM by Greg Parra MD    Review of patient's allergies indicates:   Allergen Reactions    Lipitor [atorvastatin] Itching     Itching, elevated cpk, muscle aches, statin induced myositis    Last reviewed on  1/27/2022 11:32 AM by Valeria Mcnally      Tasks added this encounter   No tasks added.   Tasks due within next 3 months   1/30/2022 - Refill Call (Auto Added)     Eric CabreraD  [facility]  [address] [place] [id number]  Phone: [phone]  Fax: [phone]

## 2022-02-03 ENCOUNTER — HOSPITAL ENCOUNTER (OUTPATIENT)
Dept: RADIOLOGY | Facility: HOSPITAL | Age: 72
Discharge: HOME | End: 2022-02-03
Attending: INTERNAL MEDICINE
Payer: MEDICARE

## 2022-02-03 ENCOUNTER — INFUSION (OUTPATIENT)
Dept: INFUSION THERAPY | Facility: HOSPITAL | Age: 72
End: 2022-02-03
Payer: MEDICARE

## 2022-02-03 VITALS
OXYGEN SATURATION: 95 % | TEMPERATURE: 98 F | RESPIRATION RATE: 16 BRPM | SYSTOLIC BLOOD PRESSURE: 133 MMHG | HEART RATE: 71 BPM | DIASTOLIC BLOOD PRESSURE: 82 MMHG

## 2022-02-03 DIAGNOSIS — C90.02 MULTIPLE MYELOMA IN RELAPSE: ICD-10-CM

## 2022-02-03 DIAGNOSIS — C90.01 MULTIPLE MYELOMA IN REMISSION: Primary | ICD-10-CM

## 2022-02-03 DIAGNOSIS — R41.3 MEMORY LOSS: ICD-10-CM

## 2022-02-03 PROCEDURE — 70553 MRI BRAIN W WO CONTRAST: ICD-10-PCS | Mod: 26,HCNC,, | Performed by: RADIOLOGY

## 2022-02-03 PROCEDURE — 70553 MRI BRAIN STEM W/O & W/DYE: CPT | Mod: 26,HCNC,, | Performed by: RADIOLOGY

## 2022-02-03 PROCEDURE — 96401 CHEMO ANTI-NEOPL SQ/IM: CPT | Mod: HCNC

## 2022-02-03 PROCEDURE — 63600175 PHARM REV CODE 636 W HCPCS: Mod: JW,JG,HCNC | Performed by: INTERNAL MEDICINE

## 2022-02-03 PROCEDURE — A9585 GADOBUTROL INJECTION: HCPCS | Mod: HCNC | Performed by: INTERNAL MEDICINE

## 2022-02-03 PROCEDURE — 70553 MRI BRAIN STEM W/O & W/DYE: CPT | Mod: TC,HCNC

## 2022-02-03 PROCEDURE — 25500020 PHARM REV CODE 255: Mod: HCNC | Performed by: INTERNAL MEDICINE

## 2022-02-03 RX ORDER — GADOBUTROL 604.72 MG/ML
7 INJECTION INTRAVENOUS
Status: COMPLETED | OUTPATIENT
Start: 2022-02-03 | End: 2022-02-03

## 2022-02-03 RX ORDER — DIPHENHYDRAMINE HYDROCHLORIDE 50 MG/ML
50 INJECTION INTRAMUSCULAR; INTRAVENOUS ONCE AS NEEDED
Status: DISCONTINUED | OUTPATIENT
Start: 2022-02-03 | End: 2022-02-03 | Stop reason: HOSPADM

## 2022-02-03 RX ORDER — BORTEZOMIB 3.5 MG/1
0.7 INJECTION, POWDER, LYOPHILIZED, FOR SOLUTION INTRAVENOUS; SUBCUTANEOUS
Status: COMPLETED | OUTPATIENT
Start: 2022-02-03 | End: 2022-02-03

## 2022-02-03 RX ORDER — EPINEPHRINE 0.3 MG/.3ML
0.3 INJECTION SUBCUTANEOUS ONCE AS NEEDED
Status: DISCONTINUED | OUTPATIENT
Start: 2022-02-03 | End: 2022-02-03 | Stop reason: HOSPADM

## 2022-02-03 RX ADMIN — BORTEZOMIB 1.3 MG: 3.5 INJECTION, POWDER, LYOPHILIZED, FOR SOLUTION INTRAVENOUS; SUBCUTANEOUS at 01:02

## 2022-02-03 RX ADMIN — GADOBUTROL 7 ML: 604.72 INJECTION INTRAVENOUS at 11:02

## 2022-02-03 NOTE — NURSING
Pt here for Velcade injection.  Assessment complete and labs reviewed.  VSS.  Administered Velcade to abdomen.  No questions or concerns.  Pt left unit in WC accompanied by her friend.

## 2022-02-04 DIAGNOSIS — Z94.84 HISTORY OF AUTOLOGOUS STEM CELL TRANSPLANT: Primary | ICD-10-CM

## 2022-02-09 DIAGNOSIS — D84.9 IMMUNOSUPPRESSED STATUS: ICD-10-CM

## 2022-02-10 ENCOUNTER — INFUSION (OUTPATIENT)
Dept: INFUSION THERAPY | Facility: HOSPITAL | Age: 72
End: 2022-02-10
Payer: MEDICARE

## 2022-02-10 ENCOUNTER — TELEPHONE (OUTPATIENT)
Dept: HEMATOLOGY/ONCOLOGY | Facility: CLINIC | Age: 72
End: 2022-02-10
Payer: MEDICARE

## 2022-02-10 VITALS
RESPIRATION RATE: 17 BRPM | HEART RATE: 64 BPM | TEMPERATURE: 98 F | DIASTOLIC BLOOD PRESSURE: 72 MMHG | SYSTOLIC BLOOD PRESSURE: 141 MMHG

## 2022-02-10 DIAGNOSIS — C90.01 MULTIPLE MYELOMA IN REMISSION: Primary | ICD-10-CM

## 2022-02-10 DIAGNOSIS — C90.02 MULTIPLE MYELOMA IN RELAPSE: ICD-10-CM

## 2022-02-10 PROCEDURE — 63600175 PHARM REV CODE 636 W HCPCS: Mod: JW,JG,HCNC | Performed by: INTERNAL MEDICINE

## 2022-02-10 PROCEDURE — 96401 CHEMO ANTI-NEOPL SQ/IM: CPT | Mod: HCNC

## 2022-02-10 RX ORDER — EPINEPHRINE 0.3 MG/.3ML
0.3 INJECTION SUBCUTANEOUS ONCE AS NEEDED
Status: DISCONTINUED | OUTPATIENT
Start: 2022-02-10 | End: 2022-02-10 | Stop reason: HOSPADM

## 2022-02-10 RX ORDER — DIPHENHYDRAMINE HYDROCHLORIDE 50 MG/ML
50 INJECTION INTRAMUSCULAR; INTRAVENOUS ONCE AS NEEDED
Status: DISCONTINUED | OUTPATIENT
Start: 2022-02-10 | End: 2022-02-10 | Stop reason: HOSPADM

## 2022-02-10 RX ORDER — BORTEZOMIB 3.5 MG/1
0.7 INJECTION, POWDER, LYOPHILIZED, FOR SOLUTION INTRAVENOUS; SUBCUTANEOUS
Status: COMPLETED | OUTPATIENT
Start: 2022-02-10 | End: 2022-02-10

## 2022-02-10 RX ADMIN — BORTEZOMIB 1.3 MG: 3.5 INJECTION, POWDER, LYOPHILIZED, FOR SOLUTION INTRAVENOUS; SUBCUTANEOUS at 12:02

## 2022-02-10 NOTE — TELEPHONE ENCOUNTER
----- Message from Adina Wright NP sent at 2/10/2022  4:54 PM CST -----  Regarding: FW: Vasyl    ----- Message -----  From: Delgado Preston  Sent: 2/10/2022   8:50 AM CST  To: [facility] Staff  Subject: Vasyl                                         Patient is requesting a callback regarding scheduling for her Evusheld injection in between her Chemo treatments. Please give the patient a callback at [phone].

## 2022-02-15 NOTE — PROGRESS NOTES
NEUROPSYCHOLOGY CONSULT (TELEHEALTH)  Referral Information  Name: Shelby Thompson  MRN: [id number]  : 1950  Age: 71 y.o.  Race: Black or   Gender: female  REFERRAL SOURCE: Greg Parra MD  DATE CONDUCTED: 2022  SOURCES OF INFORMATION:  The following was gathered from a clinical interview with Ms. Shelby Thompson and review of the available medical records. Ms. Thompson expressed an understanding of the purpose of the evaluation and consented to all procedures. Total licensed billing psychologists professional time including clinical interview, test administration and interpretation of tests administered by the billing psychologist, integration of test results and other clinical data, preparing the final report, and personally reporting results to the patient   Billin - 60 minutes  Telemedicine:   The patient location is: Home  The provider location is: Home  The chief complaint/medical necessity leading to consultation/medical necessity is: cognitive decline  Visit type: Virtual visit with audio only  Total time spent with patient: 40 minutes  Each patient to whom he or she provides medical services by telemedicine is:  (1) informed of the relationship between the physician and patient and the respective role of any other health care provider with respect to management of the patient; and (2) notified that he or she may decline to receive medical services by telemedicine and may withdraw from such care at any time.  Consent/Emergency Plan: The patient expressed an understanding of the purpose of the evaluation and consented to all procedures. I informed the patient of limits to confidentiality and discussed an emergency plan.    NEUROPSYCHOLOGICAL EVALUATION - CONFIDENTIAL    SUMMARY/TREATMENT PLAN   Ms. Thompson is a 71 year old female with multiple strokes (, ), possible cerebral amyloid angiopathy, and multiple myeloma () s/p autologous stem cell transplant in  2020. Currently maintained on Sravanthi/Noble/Dex. Ms. Thompson and family report post-stroke cognitive dysfunction, but with progressive decline over the past several years. Her  now provides support/oversight in all complex activities of daily living. She is scheduled for neuropsychological testing on 3/7 for further clarification of cognitive strengths/weaknesses. Full diagnostic impressions to follow.     Diagnoses  Problem List Items Addressed This Visit        Neuro    History of CVA with residual deficit    Mild neurocognitive disorder due to multiple etiologies - Primary    Current Assessment & Plan     Movement Neurology: Scheduled for 2/22.     Vascular Neurology: Consider referral.     Collateral Information: Will request to speak with a collateral informant during feedback. Ms. Thompson had trouble providing a clear symptom timeline during the interview.     Speech Therapy: Will discuss referral during feedback session.     Neuropsychiatric Symptoms: Persistent visual hallucinations. Will be important to monitor and possibly treat, especially if they become distressing and/or lead to delusional/paranoid thinking.     Level of Care: She appears to require support/oversight in all complex activities of daily living.     Optimize Brain Health: Prioritize physical/social/cognitive activity/stimulation. Maintain a heart healthy diet.     Follow-up: Testing on 3/7.             Oncology    Multiple myeloma in remission         Ms. Thompson will be provided the results of the evaluation.     Thank you for allowing me to participate in Ms. Yi care.  If you have any questions, please contact me at [phone].    Milo Simms Psy.D., NEVAEHP  Board Certified in Clinical Neuropsychology  Department of Neurology    HISTORY OF PRESENT ILLNESS: Ms. Shelby Thompson is a 71 y.o., right-handed, -American female with 12 years of education who was referred for a neuropsychological evaluation in the setting of  "multiple strokes and multiple myeloma. Per medical records from 2012: "She had intracerebral hemorrhage in 2008 and then a later one with both sides of the brain affected.  She had an ischemic stroke with right-sided numbness that developed in 2011.  The patient had a new diagnosis of breast cancer with breast surgery and radiation.  In late 2/2011, the patient developed right arm incoordination and imbalance in gait.  These symptoms have persisted to the present time. In 04/2011, the patient was admitted to [facility] with a new stroke, this time involving the left side.  The patient had symptoms of right-sided paresis, double vision and blurred vision and inability to ambulate due to poor balance." Impressions offered from 6/11 Brain MRI included: "EXPECTED INTERVAL EVOLUTION OF LEFT POSTERIOR FRONTAL PARENCHYMAL HEMORRHAGE. REMOTE LEFT THALAMIC HEMORRHAGE IS VISUALIZED TO BE PRESENT AND THIS OCCURRED IN THE INTERVAL FROM PREVIOUS EXAMINATION 2008."     Ms. Thompson reported cognitive and motor symptoms following the strokes. She was seen by Dr. Johnson in [facility], most recently in 2014, who noted that her motor symptoms were most consistent with "hemorrhagic thalamic outflow tremor +/- hemiballism." She also had mild right hemiparesis. She has not seen movement neurology since that time. While she was noticing cognitive changes, she was able to continue working in her role as a  at the [employer].     Ms. Thompson continually referred to 2019 as the time of her second stroke, while medical records document strokes in 2008 and 2011. She seemed to be referencing a hospitalization in 4/2019 that led to the diagnosis of multiple myeloma. Treatment course per medical records: "She achieved and tolerated well VRd x completing 7, 28 day cycles and achieving deep VGPR to induction. Then underwent Melphalan autologous stem cell transplant on 2/12/2020." Maintenance with velcade started on 5/2020. " "She relapsed about 1 year after transplant. Per medical records: "serial biochemical relapse  Noted and  given this and high risk CG    transitioned to weekly Sravanthi/Noble (1mg/m2) /dex  Salvage to which she is tolerating and responding to well as of 1/20/22 biochemical studies   -velcade decreased to 1mg/m2 for grade 1 neuropathy which is stable."    Ms. Thompson reported progressive cognitive decline. Medical records also document collateral informants reporting further cognitive dysfunction. Ms. Thompson is primarily frustrated by word finding difficulties, noting that she can become so upset that it brings her to tears. She also is prone to losing her train of thought. She has not participated in speech therapy in years. She also reported memory loss, but had trouble providing examples of forgetfulness. She indicated that her  now provides support/oversight in all complex activities of daily living. She reported worsening right hemiparesis, to the extent that it's hard for her to hold a pencil in her right hand. She indicated that her oncologist told her that this may be related to chemotherapy.     Neuropsychiatric Symptoms:  Hallucinations: Endorsed, she reported visual hallucinations over the past 6-12 months. She tends to see people with animals, mainly in the distance. She recalled being at the hospital and seeing one of her doctors walking their dog in the distance when looking out the window. There are also times when she sees children playing in the street or someone playing with a dog on the porch. She recalled an few instances when she was convinced her 3 year old granddaughter was at the house, but she wasn't.   Delusional/Paranoid Thinking: Denied  Irritability/Agitation: Endorsed, speech can be irritating. Also times when she is frustrated about not being able to remember everything she wants to tell her drs  Depression/Labile Mood: Times when she feels down due to limitations, noting that she " spends a lot of time at home.   Anxiety: intermittently.     DAILY FUNCTIONING:  BASIC ADLS:  Feeding: independent, appetite fluctuates. She may only have an appetite for breakfast.   Dressing: independent  Bathing: independent, she has a bench.     IADLS:  Support System: Resides with . Son lives in [place], [place]. Her daughter lives in [place].   Appointment Management: independent, she keeps a calendar for tracking appointments. She also adds dates for shots that she takes every 2 weeks.   Medication Compliance: Jointly with , but she seems to be doing this well. She demonstrated a strong understanding of her current medication regimen.   Financial Management:  primarily manages. She continues to balance her checkbook, but he is paying the bills.   Cooking: Her  provides oversight to avoid safety issues.   Driving: She stopped driving around at least several years ago.     BRAIN HEALTH RISK FACTORS:  Hearing Loss: Denied  Falls: Denied, she uses a walker and wheelchair for longer distances.   Sleep: She denied problems with sleep initiation, falling asleep around 11pm. She wakes up twice during the night due to nocturia. She takes a nap most days.     MEDICAL HISTORY: Ms. Thompson  has a past medical history of Acute respiratory failure with hypoxia (4/30/2019), FUENTES (acute kidney injury) (5/1/2019), Allergy, Anemia, Aortic aneurysm, Breast cancer (10/2011), Cataract, Chronic diastolic congestive heart failure (11/6/2015), Colon polyp, Community acquired pneumonia of right lower lobe of lung (8/3/2021), GERD (gastroesophageal reflux disease), Hiatal hernia, History of colonic polyps, psychiatric care, breast cancer, Hyperlipemia, Hypertension, ICH (intracerebral hemorrhage), Major depressive disorder, single episode, mild (6/23/2016), Nuclear sclerosis (7/21/2014), 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 (7/21/2014), PAD (peripheral artery disease) (11/6/2015),  "Psychiatric problem, Statin-induced rhabdomyolysis, Stroke (2011), Type 2 diabetes mellitus with diabetic peripheral angiopathy without gangrene, with long-term current use of insulin (3/31/2021), Type 2 diabetes mellitus without complication, without long-term current use of insulin (2/10/2020), and Walker as ambulation aid. L breast cancer DCIS stage 0 (s/p lumpectomy and radiation, no endocrine tx due to CVA)    NEUROIMAGIN2022 Brain MRI: "Multifocal areas of remote parenchymal hemorrhage similar to prior exam.  Distribution primarily suggestive of chronic hypertension, but few peripheral cerebral foci do raise the possibility of underlying cerebral amyloid angiopathy in a patient of this age. Mild chronic microvascular ischemic change without evidence of recent or remote major vascular distribution infarct. 1.3 cm right posterior frontal arteriovenous malformation as further detailed above."    SUBSTANCE USE: Ms. Thompson  reports that she quit smoking cigarettes in . She stopped drinking alcohol 3 years ago. No history of substance abuse.     CURRENT MEDICATIONS:    Current Outpatient Medications:     acyclovir (ZOVIRAX) 400 MG tablet, Take by mouth 2 (two) times daily., Disp: , Rfl:     albuterol (ACCUNEB) 1.25 mg/3 mL Nebu, Take 3 mLs (1.25 mg total) by nebulization every 6 (six) hours as needed (wheeze/cough). Rescue, Disp: 75 mL, Rfl: 2    albuterol (PROVENTIL HFA) 90 mcg/actuation inhaler, Inhale 2 puffs into the lungs every 6 (six) hours as needed for Wheezing. Rescue, Disp: 6.7 g, Rfl: 0    alirocumab (PRALUENT PEN) 75 mg/mL PnIj, Inject 1 mL (75 mg total) into the skin every 14 (fourteen) days., Disp: 6 mL, Rfl: 3    amLODIPine (NORVASC) 5 MG tablet, TAKE 1 TABLET BY MOUTH EVERY DAY, Disp: 90 tablet, Rfl: 3    aspirin (ECOTRIN) 81 MG EC tablet, Take 1 tablet (81 mg total) by mouth once daily., Disp: 90 tablet, Rfl: 3    benzonatate (TESSALON) 100 MG capsule, Take 1 capsule (100 mg " total) by mouth 3 (three) times daily as needed for Cough., Disp: 30 capsule, Rfl: 0    ENGERIX-B, PF, 20 mcg/mL Syrg, , Disp: , Rfl:     gabapentin (NEURONTIN) 300 MG capsule, Take 2 capsules (600 mg total) by mouth 2 (two) times daily., Disp: 180 capsule, Rfl: 2    guaiFENesin (MUCINEX) 600 mg 12 hr tablet, Take 1 tablet (600 mg total) by mouth 2 (two) times daily., Disp: 30 tablet, Rfl: 0    metFORMIN (GLUCOPHAGE) 1000 MG tablet, Take 1 tablet (1,000 mg total) by mouth 2 (two) times daily with meals., Disp: 180 tablet, Rfl: 3    omeprazole (PRILOSEC) 40 MG capsule, TAKE 1 CAPSULE BY MOUTH EVERY DAY, Disp: 90 capsule, Rfl: 3    ondansetron (ZOFRAN-ODT) 4 MG TbDL, Take 1 tablet (4 mg total) by mouth every 6 (six) hours as needed (nausea)., Disp: 12 tablet, Rfl: 0    potassium chloride SA (K-DUR,KLOR-CON) 20 MEQ tablet, TAKE 1 TABLET BY MOUTH ONCE DAILY, Disp: 90 tablet, Rfl: 3    traMADoL (ULTRAM) 50 mg tablet, Take 1 tablet (50 mg total) by mouth 2 (two) times daily as needed for Pain., Disp: 60 tablet, Rfl: 3    VELCADE 3.5 mg injection, , Disp: , Rfl:     zoledronic 4 mg/100 mL PgBk infusion, , Disp: , Rfl:      PSYCHIATRIC HISTORY: Ms. Thompson described an unremarkable pre-morbid psychiatric history with no past mental health treatment. Medical records document past treatment of Zoloft for mild depression with no current treatment.     SUICIDAL IDEATION:  History: Denied  Active Suicidal Ideation:Denied  Plan/Intent: Denied    FAMILY HISTORY: family history includes Cancer in her father; Cancer (age of onset: 63) in her sister; Dementia in her sister; Fibroids in her daughter; Hypertension in her daughter, maternal grandmother, and son; No Known Problems in her brother, brother, maternal aunt, maternal grandfather, maternal uncle, mother, paternal aunt, paternal grandfather, paternal grandmother, and paternal uncle.    PSYCHOSOCIAL HISTORY:   Education:   Level Attained: High school graduate. Went to  nursing school at [employer]   Learning Difficulties: Denied   Repeated Grade: Denied     Vocation:   Highest Attained: Nurse's aid and .     Retired: 2011    Relationship Status:   : yes, 21 years   : yes, 1x   Children: 2 (daughter and son)    MENTAL STATUS AND OBSERVATIONS:  APPEARANCE: Unable to assess.   ALERTNESS/ORIENTATION: Attentive and alert. There were inconsistencies between Ms. Thompson's history and that documented in her chart. For instance, she stated that she was working until 2019 while medical records document that she stopped working in 2011. Similarly, she stated that her second stroke occurred in 2019, while medical records note her second stroke was in 2011. Rather, she was diagnosed with multiple myeloma in 2019. She was able to accurately recall some recent events and upcoming events.   GAIT/MOTOR: Unable to assess.   SENSORY: Unable to assess.   SPEECH/LANGUAGE: Normal in rate, rhythm, tone, and volume. Mildly shaky and hoarse. Expressive and receptive language were grossly intact.  STATED MOOD/AFFECT: Mood was euthymic.   INTERPERSONAL BEHAVIOR: Rapport was quickly and easily established   THOUGHT PROCESSES: Thoughts seemed logical and goal-directed.

## 2022-02-16 ENCOUNTER — OFFICE VISIT (OUTPATIENT)
Dept: NEUROLOGY | Facility: CLINIC | Age: 72
End: 2022-02-16
Payer: MEDICARE

## 2022-02-16 DIAGNOSIS — F06.70 MILD NEUROCOGNITIVE DISORDER DUE TO MULTIPLE ETIOLOGIES: Primary | ICD-10-CM

## 2022-02-16 DIAGNOSIS — I69.30 HISTORY OF CVA WITH RESIDUAL DEFICIT: ICD-10-CM

## 2022-02-16 DIAGNOSIS — C90.01 MULTIPLE MYELOMA IN REMISSION: ICD-10-CM

## 2022-02-16 PROCEDURE — 99499 NO LOS: ICD-10-PCS | Mod: HCNC,95,, | Performed by: PSYCHIATRY & NEUROLOGY

## 2022-02-16 PROCEDURE — 3052F PR MOST RECENT HEMOGLOBIN A1C LEVEL 8.0 - < 9.0%: ICD-10-PCS | Mod: HCNC,CPTII,95, | Performed by: PSYCHIATRY & NEUROLOGY

## 2022-02-16 PROCEDURE — 96116 NUBHVL XM PHYS/QHP 1ST HR: CPT | Mod: HCNC,95,, | Performed by: PSYCHIATRY & NEUROLOGY

## 2022-02-16 PROCEDURE — 3052F HG A1C>EQUAL 8.0%<EQUAL 9.0%: CPT | Mod: HCNC,CPTII,95, | Performed by: PSYCHIATRY & NEUROLOGY

## 2022-02-16 PROCEDURE — 96116 PR NEUROBEHAVIORAL STATUS EXAM BY PSYCH/PHYS: ICD-10-PCS | Mod: HCNC,95,, | Performed by: PSYCHIATRY & NEUROLOGY

## 2022-02-16 PROCEDURE — 99499 UNLISTED E&M SERVICE: CPT | Mod: HCNC,95,, | Performed by: PSYCHIATRY & NEUROLOGY

## 2022-02-17 ENCOUNTER — CLINICAL SUPPORT (OUTPATIENT)
Dept: INFECTIOUS DISEASES | Facility: CLINIC | Age: 72
End: 2022-02-17
Payer: MEDICARE

## 2022-02-17 ENCOUNTER — INFUSION (OUTPATIENT)
Dept: INFUSION THERAPY | Facility: HOSPITAL | Age: 72
End: 2022-02-17
Payer: MEDICARE

## 2022-02-17 ENCOUNTER — TELEPHONE (OUTPATIENT)
Dept: INFECTIOUS DISEASES | Facility: HOSPITAL | Age: 72
End: 2022-02-17
Payer: MEDICARE

## 2022-02-17 ENCOUNTER — OFFICE VISIT (OUTPATIENT)
Dept: HEMATOLOGY/ONCOLOGY | Facility: CLINIC | Age: 72
End: 2022-02-17
Payer: MEDICARE

## 2022-02-17 VITALS
HEART RATE: 64 BPM | BODY MASS INDEX: 26.78 KG/M2 | SYSTOLIC BLOOD PRESSURE: 142 MMHG | TEMPERATURE: 98 F | WEIGHT: 151.13 LBS | RESPIRATION RATE: 17 BRPM | DIASTOLIC BLOOD PRESSURE: 74 MMHG | HEIGHT: 63 IN | OXYGEN SATURATION: 96 %

## 2022-02-17 VITALS
HEART RATE: 62 BPM | SYSTOLIC BLOOD PRESSURE: 150 MMHG | DIASTOLIC BLOOD PRESSURE: 72 MMHG | TEMPERATURE: 99 F | RESPIRATION RATE: 18 BRPM

## 2022-02-17 DIAGNOSIS — I10 ESSENTIAL HYPERTENSION: ICD-10-CM

## 2022-02-17 DIAGNOSIS — D84.9 IMMUNOCOMPROMISED: ICD-10-CM

## 2022-02-17 DIAGNOSIS — G25.2 COARSE TREMORS: ICD-10-CM

## 2022-02-17 DIAGNOSIS — F41.9 ANXIETY AND DEPRESSION: ICD-10-CM

## 2022-02-17 DIAGNOSIS — C90.00 MULTIPLE MYELOMA, REMISSION STATUS UNSPECIFIED: ICD-10-CM

## 2022-02-17 DIAGNOSIS — E78.00 PURE HYPERCHOLESTEROLEMIA: ICD-10-CM

## 2022-02-17 DIAGNOSIS — Z85.3 HISTORY OF LEFT BREAST CANCER: ICD-10-CM

## 2022-02-17 DIAGNOSIS — C90.01 MULTIPLE MYELOMA IN REMISSION: Primary | ICD-10-CM

## 2022-02-17 DIAGNOSIS — Z94.84 HISTORY OF AUTOLOGOUS STEM CELL TRANSPLANT: Primary | ICD-10-CM

## 2022-02-17 DIAGNOSIS — C90.02 MULTIPLE MYELOMA IN RELAPSE: ICD-10-CM

## 2022-02-17 DIAGNOSIS — I69.30 HISTORY OF CVA WITH RESIDUAL DEFICIT: ICD-10-CM

## 2022-02-17 DIAGNOSIS — T50.905A DRUG-INDUCED CYTOPENIA: ICD-10-CM

## 2022-02-17 DIAGNOSIS — F32.A ANXIETY AND DEPRESSION: ICD-10-CM

## 2022-02-17 DIAGNOSIS — D75.9 DRUG-INDUCED CYTOPENIA: ICD-10-CM

## 2022-02-17 DIAGNOSIS — Z94.84 HISTORY OF AUTOLOGOUS STEM CELL TRANSPLANT: ICD-10-CM

## 2022-02-17 DIAGNOSIS — E11.9 TYPE 2 DIABETES MELLITUS WITHOUT COMPLICATION, WITHOUT LONG-TERM CURRENT USE OF INSULIN: ICD-10-CM

## 2022-02-17 PROBLEM — F06.70 MILD NEUROCOGNITIVE DISORDER DUE TO MULTIPLE ETIOLOGIES: Status: ACTIVE | Noted: 2022-01-27

## 2022-02-17 PROCEDURE — 1101F PR PT FALLS ASSESS DOC 0-1 FALLS W/OUT INJ PAST YR: ICD-10-PCS | Mod: HCNC,CPTII,S$GLB, | Performed by: NURSE PRACTITIONER

## 2022-02-17 PROCEDURE — 1126F PR PAIN SEVERITY QUANTIFIED, NO PAIN PRESENT: ICD-10-PCS | Mod: HCNC,CPTII,S$GLB, | Performed by: NURSE PRACTITIONER

## 2022-02-17 PROCEDURE — 25000003 PHARM REV CODE 250: Mod: HCNC | Performed by: INTERNAL MEDICINE

## 2022-02-17 PROCEDURE — 96401 CHEMO ANTI-NEOPL SQ/IM: CPT | Mod: HCNC

## 2022-02-17 PROCEDURE — 3077F PR MOST RECENT SYSTOLIC BLOOD PRESSURE >= 140 MM HG: ICD-10-PCS | Mod: HCNC,CPTII,S$GLB, | Performed by: NURSE PRACTITIONER

## 2022-02-17 PROCEDURE — G0009 ADMIN PNEUMOCOCCAL VACCINE: HCPCS | Mod: HCNC,S$GLB,, | Performed by: INTERNAL MEDICINE

## 2022-02-17 PROCEDURE — 90732 PNEUMOCOCCAL POLYSACCHARIDE VACCINE 23-VALENT =>2YO SQ IM: ICD-10-PCS | Mod: HCNC,S$GLB,, | Performed by: INTERNAL MEDICINE

## 2022-02-17 PROCEDURE — 99215 PR OFFICE/OUTPT VISIT, EST, LEVL V, 40-54 MIN: ICD-10-PCS | Mod: HCNC,S$GLB,, | Performed by: NURSE PRACTITIONER

## 2022-02-17 PROCEDURE — 3288F FALL RISK ASSESSMENT DOCD: CPT | Mod: HCNC,CPTII,S$GLB, | Performed by: NURSE PRACTITIONER

## 2022-02-17 PROCEDURE — 3077F SYST BP >= 140 MM HG: CPT | Mod: HCNC,CPTII,S$GLB, | Performed by: NURSE PRACTITIONER

## 2022-02-17 PROCEDURE — 63600175 PHARM REV CODE 636 W HCPCS: Mod: JG,HCNC | Performed by: INTERNAL MEDICINE

## 2022-02-17 PROCEDURE — 3288F PR FALLS RISK ASSESSMENT DOCUMENTED: ICD-10-PCS | Mod: HCNC,CPTII,S$GLB, | Performed by: NURSE PRACTITIONER

## 2022-02-17 PROCEDURE — G0009 PNEUMOCOCCAL POLYSACCHARIDE VACCINE 23-VALENT =>2YO SQ IM: ICD-10-PCS | Mod: HCNC,S$GLB,, | Performed by: INTERNAL MEDICINE

## 2022-02-17 PROCEDURE — 99999 PR PBB SHADOW E&M-EST. PATIENT-LVL IV: ICD-10-PCS | Mod: PBBFAC,HCNC,, | Performed by: NURSE PRACTITIONER

## 2022-02-17 PROCEDURE — 3078F DIAST BP <80 MM HG: CPT | Mod: HCNC,CPTII,S$GLB, | Performed by: NURSE PRACTITIONER

## 2022-02-17 PROCEDURE — 3008F BODY MASS INDEX DOCD: CPT | Mod: HCNC,CPTII,S$GLB, | Performed by: NURSE PRACTITIONER

## 2022-02-17 PROCEDURE — 3052F PR MOST RECENT HEMOGLOBIN A1C LEVEL 8.0 - < 9.0%: ICD-10-PCS | Mod: HCNC,CPTII,S$GLB, | Performed by: NURSE PRACTITIONER

## 2022-02-17 PROCEDURE — 3078F PR MOST RECENT DIASTOLIC BLOOD PRESSURE < 80 MM HG: ICD-10-PCS | Mod: HCNC,CPTII,S$GLB, | Performed by: NURSE PRACTITIONER

## 2022-02-17 PROCEDURE — 99999 PR PBB SHADOW E&M-EST. PATIENT-LVL IV: CPT | Mod: PBBFAC,HCNC,, | Performed by: NURSE PRACTITIONER

## 2022-02-17 PROCEDURE — 3008F PR BODY MASS INDEX (BMI) DOCUMENTED: ICD-10-PCS | Mod: HCNC,CPTII,S$GLB, | Performed by: NURSE PRACTITIONER

## 2022-02-17 PROCEDURE — 1101F PT FALLS ASSESS-DOCD LE1/YR: CPT | Mod: HCNC,CPTII,S$GLB, | Performed by: NURSE PRACTITIONER

## 2022-02-17 PROCEDURE — 90732 PPSV23 VACC 2 YRS+ SUBQ/IM: CPT | Mod: HCNC,S$GLB,, | Performed by: INTERNAL MEDICINE

## 2022-02-17 PROCEDURE — 1126F AMNT PAIN NOTED NONE PRSNT: CPT | Mod: HCNC,CPTII,S$GLB, | Performed by: NURSE PRACTITIONER

## 2022-02-17 PROCEDURE — 99215 OFFICE O/P EST HI 40 MIN: CPT | Mod: HCNC,S$GLB,, | Performed by: NURSE PRACTITIONER

## 2022-02-17 PROCEDURE — 3052F HG A1C>EQUAL 8.0%<EQUAL 9.0%: CPT | Mod: HCNC,CPTII,S$GLB, | Performed by: NURSE PRACTITIONER

## 2022-02-17 RX ORDER — DIPHENHYDRAMINE HYDROCHLORIDE 50 MG/ML
50 INJECTION INTRAMUSCULAR; INTRAVENOUS ONCE AS NEEDED
Status: CANCELLED | OUTPATIENT
Start: 2022-03-03

## 2022-02-17 RX ORDER — EPINEPHRINE 0.3 MG/.3ML
0.3 INJECTION SUBCUTANEOUS ONCE AS NEEDED
Status: CANCELLED | OUTPATIENT
Start: 2022-02-17

## 2022-02-17 RX ORDER — EPINEPHRINE 0.3 MG/.3ML
0.3 INJECTION SUBCUTANEOUS ONCE AS NEEDED
Status: CANCELLED | OUTPATIENT
Start: 2022-03-10

## 2022-02-17 RX ORDER — DIPHENHYDRAMINE HYDROCHLORIDE 50 MG/ML
50 INJECTION INTRAMUSCULAR; INTRAVENOUS ONCE AS NEEDED
Status: DISCONTINUED | OUTPATIENT
Start: 2022-02-17 | End: 2022-02-17 | Stop reason: HOSPADM

## 2022-02-17 RX ORDER — ACETAMINOPHEN 325 MG/1
650 TABLET ORAL
Status: CANCELLED | OUTPATIENT
Start: 2022-02-17

## 2022-02-17 RX ORDER — DIPHENHYDRAMINE HCL 25 MG
25 CAPSULE ORAL
Status: CANCELLED | OUTPATIENT
Start: 2022-02-17

## 2022-02-17 RX ORDER — BORTEZOMIB 3.5 MG/1
0.7 INJECTION, POWDER, LYOPHILIZED, FOR SOLUTION INTRAVENOUS; SUBCUTANEOUS
Status: COMPLETED | OUTPATIENT
Start: 2022-02-17 | End: 2022-02-17

## 2022-02-17 RX ORDER — DIPHENHYDRAMINE HCL 25 MG
25 CAPSULE ORAL
Status: COMPLETED | OUTPATIENT
Start: 2022-02-17 | End: 2022-02-17

## 2022-02-17 RX ORDER — EPINEPHRINE 0.3 MG/.3ML
0.3 INJECTION SUBCUTANEOUS ONCE AS NEEDED
Status: CANCELLED | OUTPATIENT
Start: 2022-03-03

## 2022-02-17 RX ORDER — ACETAMINOPHEN 325 MG/1
650 TABLET ORAL
Status: COMPLETED | OUTPATIENT
Start: 2022-02-17 | End: 2022-02-17

## 2022-02-17 RX ORDER — DIPHENHYDRAMINE HYDROCHLORIDE 50 MG/ML
50 INJECTION INTRAMUSCULAR; INTRAVENOUS ONCE AS NEEDED
Status: CANCELLED | OUTPATIENT
Start: 2022-02-17

## 2022-02-17 RX ORDER — BORTEZOMIB 3.5 MG/1
0.7 INJECTION, POWDER, LYOPHILIZED, FOR SOLUTION INTRAVENOUS; SUBCUTANEOUS
Status: CANCELLED | OUTPATIENT
Start: 2022-02-17

## 2022-02-17 RX ORDER — EPINEPHRINE 0.3 MG/.3ML
0.3 INJECTION SUBCUTANEOUS ONCE AS NEEDED
Status: CANCELLED | OUTPATIENT
Start: 2022-02-24

## 2022-02-17 RX ORDER — EPINEPHRINE 0.3 MG/.3ML
0.3 INJECTION SUBCUTANEOUS ONCE AS NEEDED
Status: DISCONTINUED | OUTPATIENT
Start: 2022-02-17 | End: 2022-02-17 | Stop reason: HOSPADM

## 2022-02-17 RX ORDER — BORTEZOMIB 3.5 MG/1
0.7 INJECTION, POWDER, LYOPHILIZED, FOR SOLUTION INTRAVENOUS; SUBCUTANEOUS
Status: CANCELLED | OUTPATIENT
Start: 2022-03-10

## 2022-02-17 RX ORDER — DIPHENHYDRAMINE HYDROCHLORIDE 50 MG/ML
50 INJECTION INTRAMUSCULAR; INTRAVENOUS ONCE AS NEEDED
Status: CANCELLED | OUTPATIENT
Start: 2022-02-24

## 2022-02-17 RX ORDER — DIPHENHYDRAMINE HYDROCHLORIDE 50 MG/ML
50 INJECTION INTRAMUSCULAR; INTRAVENOUS ONCE AS NEEDED
Status: CANCELLED | OUTPATIENT
Start: 2022-03-10

## 2022-02-17 RX ORDER — BORTEZOMIB 3.5 MG/1
0.7 INJECTION, POWDER, LYOPHILIZED, FOR SOLUTION INTRAVENOUS; SUBCUTANEOUS
Status: CANCELLED | OUTPATIENT
Start: 2022-02-24

## 2022-02-17 RX ORDER — BORTEZOMIB 3.5 MG/1
0.7 INJECTION, POWDER, LYOPHILIZED, FOR SOLUTION INTRAVENOUS; SUBCUTANEOUS
Status: CANCELLED | OUTPATIENT
Start: 2022-03-03

## 2022-02-17 RX ADMIN — ACETAMINOPHEN 650 MG: 325 TABLET ORAL at 10:02

## 2022-02-17 RX ADMIN — BORTEZOMIB 1.3 MG: 3.5 INJECTION, POWDER, LYOPHILIZED, FOR SOLUTION INTRAVENOUS; SUBCUTANEOUS at 11:02

## 2022-02-17 RX ADMIN — DIPHENHYDRAMINE HYDROCHLORIDE 25 MG: 25 CAPSULE ORAL at 10:02

## 2022-02-17 RX ADMIN — DARATUMUMAB AND HYALURONIDASE-FIHJ (HUMAN RECOMBINANT) 1800 MG: 1800; 30000 INJECTION SUBCUTANEOUS at 11:02

## 2022-02-17 NOTE — ASSESSMENT & PLAN NOTE
Movement Neurology: Scheduled for 2/22.     Vascular Neurology: Consider referral.     Collateral Information: Will request to speak with a collateral informant during feedback. Ms. Thompson had trouble providing a clear symptom timeline during the interview.     Speech Therapy: Will discuss referral during feedback session.     Neuropsychiatric Symptoms: Persistent visual hallucinations. Will be important to monitor and possibly treat, especially if they become distressing and/or lead to delusional/paranoid thinking.     Level of Care: She appears to require support/oversight in all complex activities of daily living.     Optimize Brain Health: Prioritize physical/social/cognitive activity/stimulation. Maintain a heart healthy diet.     Follow-up: Testing on 3/7.

## 2022-02-17 NOTE — PLAN OF CARE
1200 pt tolerated velcade and darzalex injection without issue, pt to rtc 2/24/22 for velcade, no distress noted upon d/c to home with sister

## 2022-02-17 NOTE — Clinical Note
-velcade injection on 02/24,03/03,03/10 -kenia injection on 03/17 -cbc, cmp, serum free light chains, quantitative immunoglobulins, serum electropheresis, serum immunofixation and np/ appt prior to treatment on 03/17

## 2022-02-17 NOTE — PLAN OF CARE
1030 pt here for darzalex /velcade injection, labs, hx, meds, allergies reviewed, pt with no new complaints at this time, reclined in chair, continue to monitor

## 2022-02-17 NOTE — PROGRESS NOTES
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  She achieved and tolerated well VRd x completing 7, 28 day cycles and achieving deep VGPR to induction. Then underwent Melphalan autologous stem cell transplant on 2/12/2020.      Extensive PMH as below.    2 prior CVAs (one in 2008, one in 2011)  L breast cancer DCIS stage 0 (s/p lumpectomy and radiation, no endocrine tx due to CVA)  HTN  DM II  Neuropathy, b/l hands  Prior smoker, quit in 2011  Hepatic lesions - vascular per recent MRI in 09 /2019 with no changes; will confirm no further rascon needed from help  Statin Intolerance - on praluent, PCSK9 inhibitor  HFpEF - EF 55%, diastolic dysfunction  Anxiety/Depression     ADMISSION SUMMARY: 2/10-2/26/2020  Admitted 2/10, tolerated chemo and transplant well. Engrafted on day +12. Remained afebrile throughout hospital stay and no mucositis. Experienced expected side effects of nausea and diarrhea controlled with antiemetics and Imodium. Discharged home with home PT/OT       Subjective:       Initial History: Ms. Thompson is a 71 y.o. female who returns for follow up of multiple myeloma in remission. She is 2 year post AutoSCT.   On kenia/zhanna/dex after biochemical relapse noted approximately 1 year post transplant. Tolerating and responding well. post-stroke cognitive dysfunction, but with progressive decline over the past several years. Her  now provides support/oversight in all complex activities of daily living. Her friend assist her for transportation and appointments. She is scheduled for neuropsychological testing on 3/7 for  further clarification of cognitive strengths/weaknesses. Seen by neurologist yesterday.   Fingertip neuropathy stable.  Accompanied by her friend.    Today is C8D1 kenia/zhanna/dex.    Otherwise no signs symptoms of kenia intolerance or myeloma progression.     Past Medical History:   Past Medical History:   Diagnosis Date    Acute respiratory failure with hypoxia 4/30/2019    FUENTES (acute kidney injury) 5/1/2019    Allergy     Anemia     Aortic aneurysm     Breast cancer 10/2011    left breast Stage 0 DCIS    Cataract     Chronic diastolic congestive heart failure 11/6/2015    Colon polyp     Community acquired pneumonia of right lower lobe of lung 8/3/2021    GERD (gastroesophageal reflux disease)     Hiatal hernia     History of colonic polyps     Hx of psychiatric care     Zoloft    HX: breast cancer     Hyperlipemia     Hypertension     ICH (intracerebral hemorrhage)     Major depressive disorder, single episode, mild 6/23/2016    Nuclear sclerosis 7/21/2014    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 7/21/2014    PAD (peripheral artery disease) 11/6/2015    Psychiatric problem     Statin-induced rhabdomyolysis     4/2015     Stroke 4/2011    Type 2 diabetes mellitus with diabetic peripheral angiopathy without gangrene, with long-term current use of insulin 3/31/2021    Type 2 diabetes mellitus without complication, without long-term current use of insulin 2/10/2020    Walker as ambulation aid        Past Surgical HIstory:   Past Surgical History:   Procedure Laterality Date    APPENDECTOMY      BONE MARROW BIOPSY Left 10/3/2019    Procedure: Biopsy-bone marrow;  Surgeon: Jere Tineo MD;  Location: [address];  Service: Oncology;  Laterality: Left;    BREAST BIOPSY Left 10/2011    BREAST LUMPECTOMY Left 2011    DCIS    COLONOSCOPY      COLONOSCOPY N/A 1/9/2020    Procedure: COLONOSCOPY;  Surgeon: Quang De La Cruz MD;  Location: [facility]  (4TH FLR);  Service: Endoscopy;  Laterality: N/A;    CYST REMOVAL      back    ESOPHAGOGASTRODUODENOSCOPY  07/2016    duodenal angioectasia    ESOPHAGOGASTRODUODENOSCOPY N/A 1/9/2020    Procedure: EGD (ESOPHAGOGASTRODUODENOSCOPY);  Surgeon: Quang De La Cruz MD;  Location: [facility] (4TH FLR);  Service: Endoscopy;  Laterality: N/A;    FOOT FRACTURE SURGERY  10/2012    right foot, with R hallux valgus repair    FRACTURE SURGERY Right     broken toe repiar    HEMORRHOID SURGERY      LIVER BIOPSY  04/2015    essentially normal, no fibrosis    TUBAL LIGATION      UPPER GASTROINTESTINAL ENDOSCOPY         Family History:   Family History   Problem Relation Age of Onset    Dementia Sister         x1 sister    Cancer Sister 63        Lung Cancer    No Known Problems Mother     Cancer Father     No Known Problems Brother     Hypertension Daughter     Fibroids Daughter         uterine    Hypertension Son     No Known Problems Brother     No Known Problems Maternal Aunt     No Known Problems Maternal Uncle     No Known Problems Paternal Aunt     No Known Problems Paternal Uncle     Hypertension Maternal Grandmother     No Known Problems Maternal Grandfather     No Known Problems Paternal Grandmother     No Known Problems Paternal Grandfather     Ovarian cancer Neg Hx     Colon cancer Neg Hx     Tremor Neg Hx     Amblyopia Neg Hx     Blindness Neg Hx     Cataracts Neg Hx     Diabetes Neg Hx     Glaucoma Neg Hx     Macular degeneration Neg Hx     Retinal detachment Neg Hx     Strabismus Neg Hx     Stroke Neg Hx     Thyroid disease Neg Hx     Esophageal cancer Neg Hx     Rectal cancer Neg Hx     Stomach cancer Neg Hx     Ulcerative colitis Neg Hx     Crohn's disease Neg Hx     Irritable bowel syndrome Neg Hx     Celiac disease Neg Hx        Social History:  reports that she quit smoking about 10 years ago. Her smoking use included cigarettes. She started smoking about 55 years ago. She  has a 11.00 pack-year smoking history. She has never used smokeless tobacco. She reports previous alcohol use. She reports that she does not use drugs.    Allergies:  Review of patient's allergies indicates:   Allergen Reactions    Lipitor [atorvastatin] Itching     Itching, elevated cpk, muscle aches, statin induced myositis       Medications:  Current Outpatient Medications   Medication Sig Dispense Refill    acyclovir (ZOVIRAX) 400 MG tablet Take by mouth 2 (two) times daily.      albuterol (ACCUNEB) 1.25 mg/3 mL Nebu Take 3 mLs (1.25 mg total) by nebulization every 6 (six) hours as needed (wheeze/cough). Rescue 75 mL 2    albuterol (PROVENTIL HFA) 90 mcg/actuation inhaler Inhale 2 puffs into the lungs every 6 (six) hours as needed for Wheezing. Rescue 6.7 g 0    alirocumab (PRALUENT PEN) 75 mg/mL PnIj Inject 1 mL (75 mg total) into the skin every 14 (fourteen) days. 6 mL 3    amLODIPine (NORVASC) 5 MG tablet TAKE 1 TABLET BY MOUTH EVERY DAY 90 tablet 3    aspirin (ECOTRIN) 81 MG EC tablet Take 1 tablet (81 mg total) by mouth once daily. 90 tablet 3    benzonatate (TESSALON) 100 MG capsule Take 1 capsule (100 mg total) by mouth 3 (three) times daily as needed for Cough. 30 capsule 0    ENGERIX-B, PF, 20 mcg/mL Syrg       gabapentin (NEURONTIN) 300 MG capsule Take 2 capsules (600 mg total) by mouth 2 (two) times daily. 180 capsule 2    guaiFENesin (MUCINEX) 600 mg 12 hr tablet Take 1 tablet (600 mg total) by mouth 2 (two) times daily. 30 tablet 0    metFORMIN (GLUCOPHAGE) 1000 MG tablet Take 1 tablet (1,000 mg total) by mouth 2 (two) times daily with meals. 180 tablet 3    omeprazole (PRILOSEC) 40 MG capsule TAKE 1 CAPSULE BY MOUTH EVERY DAY 90 capsule 3    ondansetron (ZOFRAN-ODT) 4 MG TbDL Take 1 tablet (4 mg total) by mouth every 6 (six) hours as needed (nausea). 12 tablet 0    potassium chloride SA (K-DUR,KLOR-CON) 20 MEQ tablet TAKE 1 TABLET BY MOUTH ONCE DAILY 90 tablet 3    traMADoL (ULTRAM)  50 mg tablet Take 1 tablet (50 mg total) by mouth 2 (two) times daily as needed for Pain. 60 tablet 3    VELCADE 3.5 mg injection       zoledronic 4 mg/100 mL PgBk infusion        No current facility-administered medications for this visit.     Facility-Administered Medications Ordered in Other Visits   Medication Dose Route Frequency Provider Last Rate Last Admin    bortezomib (VELCADE) injection 1.3 mg  0.7 mg/m2 (Treatment Plan Recorded) Subcutaneous 1 time in Clinic/HOD Jere Tineo MD        daratumumab-hyaluronidase-[facility] 1,800 mg  1,800 mg Subcutaneous 1 time in Clinic/HOD Jere Tineo MD        diphenhydrAMINE injection 50 mg  50 mg Intravenous Once PRN Jere Tineo MD        EPINEPHrine (EPIPEN) 0.3 mg/0.3 mL pen injection 0.3 mg  0.3 mg Intramuscular Once PRN Jere Tineo MD        hydrocortisone sodium succinate injection 100 mg  100 mg Intravenous Once PRN Jere Tineo MD           Review of Systems   Constitutional: Positive for fatigue. Negative for appetite change, chills and fever.   HENT: Negative for ear discharge, ear pain, nosebleeds, postnasal drip, rhinorrhea, sinus pressure and sore throat.    Eyes: Negative for pain.   Respiratory: Negative for chest tightness and shortness of breath.    Cardiovascular: Negative for chest pain, palpitations and leg swelling.   Gastrointestinal: Negative for abdominal distention, abdominal pain, blood in stool, constipation, diarrhea, nausea and vomiting.   Genitourinary: Negative.  Negative for dysuria and hematuria.   Musculoskeletal: Negative.  Negative for back pain, gait problem and myalgias.   Skin: Negative.    Neurological: Positive for tremors and numbness. Negative for syncope and headaches.   Hematological: Negative for adenopathy. Does not bruise/bleed easily.   Psychiatric/Behavioral: The patient is not nervous/anxious.         Memory issues       ECOG Performance Status: 2 (due to remote  CVA)    Objective:      Vitals:   Vitals:    02/17/22 0905   BP: (!) 142/74   Pulse: 64   Resp: 17   Temp: 98.4 °F (36.9 °C)        PE:   General -in wheelchair  no apparent distress  HEENT - oropharynx clear  Chest and Lung -bilateral end expiratory wheezes  Cardiovascular - RRR with no MGR, normal S1 and S2  Abdomen-  soft, nontender, no palpable hepatomegaly or splenomegaly  Lymph - no palpable lymphadenopathy  Heme - no bruising, petechiae, pallor  Skin - no rashes or lesions  Psych - appropriate mood and affect  Neuro -  No change in chronic residual CVA symptoms    Laboratory Data:  Lab Visit on 02/17/2022   Component Date Value Ref Range Status    WBC 02/17/2022 3.22* 3.90 - 12.70 K/uL Final    RBC 02/17/2022 4.27  4.00 - 5.40 M/uL Final    Hemoglobin 02/17/2022 10.5* 12.0 - 16.0 g/dL Final    Hematocrit 02/17/2022 34.6* 37.0 - 48.5 % Final    MCV 02/17/2022 81* 82 - 98 fL Final    MCH 02/17/2022 24.6* 27.0 - 31.0 pg Final    MCHC 02/17/2022 30.3* 32.0 - 36.0 g/dL Final    RDW 02/17/2022 16.9* 11.5 - 14.5 % Final    Platelets 02/17/2022 138* 150 - 450 K/uL Final    MPV 02/17/2022 13.0* 9.2 - 12.9 fL Final    Immature Granulocytes 02/17/2022 0.0  0.0 - 0.5 % Final    Gran # (ANC) 02/17/2022 1.9  1.8 - 7.7 K/uL Final    Immature Grans (Abs) 02/17/2022 0.00  0.00 - 0.04 K/uL Final    Comment: Mild elevation in immature granulocytes is non specific and   can be seen in a variety of conditions including stress response,   acute inflammation, trauma and pregnancy. Correlation with other   laboratory and clinical findings is essential.      Lymph # 02/17/2022 0.9* 1.0 - 4.8 K/uL Final    Mono # 02/17/2022 0.3  0.3 - 1.0 K/uL Final    Eos # 02/17/2022 0.1  0.0 - 0.5 K/uL Final    Baso # 02/17/2022 0.01  0.00 - 0.20 K/uL Final    nRBC 02/17/2022 0  0 /100 WBC Final    Gran % 02/17/2022 59.6  38.0 - 73.0 % Final    Lymph % 02/17/2022 28.9  18.0 - 48.0 % Final    Mono % 02/17/2022 8.7  4.0 - 15.0 % Final     Eosinophil % 02/17/2022 2.5  0.0 - 8.0 % Final    Basophil % 02/17/2022 0.3  0.0 - 1.9 % Final    Differential Method 02/17/2022 Automated   Final    Sodium 02/17/2022 145  136 - 145 mmol/L Final    Potassium 02/17/2022 4.1  3.5 - 5.1 mmol/L Final    Chloride 02/17/2022 107  95 - 110 mmol/L Final    CO2 02/17/2022 27  23 - 29 mmol/L Final    Glucose 02/17/2022 84  70 - 110 mg/dL Final    BUN 02/17/2022 10  8 - 23 mg/dL Final    Creatinine 02/17/2022 0.8  0.5 - 1.4 mg/dL Final    Calcium 02/17/2022 9.0  8.7 - 10.5 mg/dL Final    Total Protein 02/17/2022 7.2  6.0 - 8.4 g/dL Final    Albumin 02/17/2022 4.0  3.5 - 5.2 g/dL Final    Total Bilirubin 02/17/2022 0.6  0.1 - 1.0 mg/dL Final    Comment: For infants and newborns, interpretation of results should be based  on gestational age, weight and in agreement with clinical  observations.    Premature Infant recommended reference ranges:  Up to 24 hours.............<8.0 mg/dL  Up to 48 hours............<12.0 mg/dL  3-5 days..................<15.0 mg/dL  6-29 days.................<15.0 mg/dL      Alkaline Phosphatase 02/17/2022 42* 55 - 135 U/L Final    AST 02/17/2022 14  10 - 40 U/L Final    ALT 02/17/2022 6* 10 - 44 U/L Final    Anion Gap 02/17/2022 11  8 - 16 mmol/L Final    eGFR if African American 02/17/2022 >60.0  >60 mL/min/1.73 m^2 Final    eGFR if non African American 02/17/2022 >60.0  >60 mL/min/1.73 m^2 Final    Comment: Calculation used to obtain the estimated glomerular filtration  rate (eGFR) is the CKD-EPI equation.       IgG 02/17/2022 932  650 - 1600 mg/dL Final    IgG Cord Blood Reference Range: 650-1600 mg/dL.    IgA 02/17/2022 58  40 - 350 mg/dL Final    IgA Cord Blood Reference Range: <5 mg/dL.    IgM 02/17/2022 72  50 - 300 mg/dL Final    IgM Cord Blood Reference Range: <25 mg/dL.    Protein, Serum 02/17/2022 7.1  6.0 - 8.4 g/dL Final    Comment: Serum protein electrophoresis and immunofixation results should be   interpreted  in clinical context in that some therapeutic agents can   result   in false positive results (example, daratumumab). Correlation with   the   patient s therapeutic regimen is required.        Lab Results   Component Value Date    WBC 3.22 (L) 02/17/2022    HGB 10.5 (L) 02/17/2022    HCT 34.6 (L) 02/17/2022    MCV 81 (L) 02/17/2022     (L) 02/17/2022       Gran # (ANC)   Date Value Ref Range Status   02/17/2022 1.9 1.8 - 7.7 K/uL Final     Gran %   Date Value Ref Range Status   02/17/2022 59.6 38.0 - 73.0 % Final     Kappa Free Light Chains   Date Value Ref Range Status   01/13/2022 3.77 (H) 0.33 - 1.94 mg/dL Final   12/16/2021 4.66 (H) 0.33 - 1.94 mg/dL Final   11/15/2021 1.20 0.33 - 1.94 mg/dL Final     Lambda Free Light Chains   Date Value Ref Range Status   01/13/2022 2.45 0.57 - 2.63 mg/dL Final   12/16/2021 1.57 0.57 - 2.63 mg/dL Final   11/15/2021 0.27 (L) 0.57 - 2.63 mg/dL Final     Kappa/Lambda FLC Ratio   Date Value Ref Range Status   01/13/2022 1.54 0.26 - 1.65 Final     Comment:     Undetected antigen excess is a rare event but cannot   be excluded. If these free light chain results do not   agree with other clinical or laboratory findings or   if the sample is from a patient that has previously   demonstrated antigen excess, discuss with the testing   laboratory.   Results should always be interpreted in conjunction   with other laboratory tests and clinical evidence.     12/16/2021 2.97 (H) 0.26 - 1.65 Final     Comment:     Undetected antigen excess is a rare event but cannot   be excluded. If these free light chain results do not   agree with other clinical or laboratory findings or   if the sample is from a patient that has previously   demonstrated antigen excess, discuss with the testing   laboratory.   Results should always be interpreted in conjunction   with other laboratory tests and clinical evidence.     11/15/2021 4.44 (H) 0.26 - 1.65 Final     Comment:     Undetected antigen excess  is a rare event but cannot   be excluded. If these free light chain results do not   agree with other clinical or laboratory findings or   if the sample is from a patient that has previously   demonstrated antigen excess, discuss with the testing   laboratory.   Results should always be interpreted in conjunction   with other laboratory tests and clinical evidence.       IgG   Date Value Ref Range Status   02/17/2022 932 650 - 1600 mg/dL Final     Comment:     IgG Cord Blood Reference Range: 650-1600 mg/dL.     IgA   Date Value Ref Range Status   02/17/2022 58 40 - 350 mg/dL Final     Comment:     IgA Cord Blood Reference Range: <5 mg/dL.     IgM   Date Value Ref Range Status   02/17/2022 72 50 - 300 mg/dL Final     Comment:     IgM Cord Blood Reference Range: <25 mg/dL.   Pathologist Interpretation SPE  Pathologist Interpretation SPE  Collected: 01/13/22 1121   Result status: Final   Resulting lab: [facility]   Value: REVIEWED   Comment:   Electronically reviewed and signed by:   Destiny Lopes M.D.   Signed on 01/18/22 at 14:00   Normal total protein.   Normal gamma globulins are decreased.   Two closely adjacent paraprotein bands:   1) Near-gamma = 0.22 g/dL (previously 0.19 g/dL).   2) Mid-gamma = 0.17 g/dL (previously, 0.15 g/dL).    *Additional information available - comment           Imaging:      Assessment:       Ms. Thompson is a 71 year old female with relapsed multiple myeloma.     Plan:     MM s/p Auto SCT  - high risk MM with 17p deletion  - 2 year s/p Auto SCT  - started maintenance q2w velcade 5/6/20. Receiving C8 today  - serial biochemical relapse  Noted and  given this and high risk CG    transitioned to weekly Sravanthi/Noble (1mg/m2) /dex  Salvage to which she is tolerating and responding to well as of 1/13/22 biochemical studies. Today's level pending.    -velcade decreased to 1mg/m2 for grade 1 neuropathy which is stable  -supportive meds:   continue ppx acyclovir, asa;  "resume maintenance zometa q 3 months for 1 year; dose today; next due mid April 2022    -post transplant vaccines completed     Cytopenia due to chemotherapy  - stable mild  -due to therapy monitor      Neuropathy  -Stable   - continues gabapentin and prn tramadol  -dose reduced velcade further to 0.7mg/m2 moving forward (12/16/21)     SOB/descending aortic aneurysm  - CTA and dopplers ordered urgently with high concern for DVT/PE; completed 8/3/20  - incidental descending thoracic aortic aneurysm noted; referred to thoracic surgery; seen 8/7/20; per note: "at current size would not recommend any intervention as risk of rupture remains low.  Recommend surveillance CT chest every 12 months to monitor growth of the aneurysm.  Would typically recommend aspirin 81 mg daily in patients with aortic aneurysm  - ASA started (9/14/20)     HTN  - continue norvasc  - Patient to monitor BP closely  Anxiety/depression  - continue zoloft    DM2  - diet controlled    Hx of CVA  - s/p 2008, 2011    HLD with Statin Intolerance  -on praluent, PCSK9 inhibitor    L breast cancer DCIS stage 0  -s/p lumpectomy and radiation, no endocrine tx due to CVA    Coarse tremors/memory issues  - Newly develped tremors   - Short term memory issues worsened  - repeat MRI brain 12/16/21 Multifocal areas of remote parenchymal hemorrhage similar to prior exam, possibility of underlying cerebral amyloid angiopathy.  - Seen by neurologist on 02/16/22.  neuropsychological testing scheduled on 3/7/22    Follow up:  -velcade injection on 02/24,03/03,03/10  -kenia injection on 03/17  -cbc, cmp, serum free light chains, quantitative immunoglobulins, serum electropheresis, serum immunofixation and np/ appt prior to treatment on 03/17    Adina Wright NP  Hematology/Oncology/BMT      "

## 2022-02-19 PROCEDURE — G0179 PR HOME HEALTH MD RECERTIFICATION: ICD-10-PCS | Mod: ,,, | Performed by: INTERNAL MEDICINE

## 2022-02-19 PROCEDURE — G0179 MD RECERTIFICATION HHA PT: HCPCS | Mod: ,,, | Performed by: INTERNAL MEDICINE

## 2022-02-22 ENCOUNTER — OFFICE VISIT (OUTPATIENT)
Dept: NEUROLOGY | Facility: CLINIC | Age: 72
End: 2022-02-22
Payer: MEDICARE

## 2022-02-22 ENCOUNTER — LAB VISIT (OUTPATIENT)
Dept: LAB | Facility: HOSPITAL | Age: 72
End: 2022-02-22
Payer: MEDICARE

## 2022-02-22 VITALS
HEIGHT: 63 IN | BODY MASS INDEX: 26.77 KG/M2 | SYSTOLIC BLOOD PRESSURE: 136 MMHG | HEART RATE: 64 BPM | DIASTOLIC BLOOD PRESSURE: 86 MMHG

## 2022-02-22 DIAGNOSIS — I69.30 HISTORY OF CVA WITH RESIDUAL DEFICIT: ICD-10-CM

## 2022-02-22 DIAGNOSIS — R25.1 TREMOR: Primary | ICD-10-CM

## 2022-02-22 DIAGNOSIS — J44.9 CHRONIC OBSTRUCTIVE PULMONARY DISEASE, UNSPECIFIED COPD TYPE: ICD-10-CM

## 2022-02-22 DIAGNOSIS — R41.3 MEMORY LOSS: ICD-10-CM

## 2022-02-22 DIAGNOSIS — G62.0 DRUG-INDUCED POLYNEUROPATHY: ICD-10-CM

## 2022-02-22 DIAGNOSIS — R27.8 SENSORY ATAXIA: ICD-10-CM

## 2022-02-22 DIAGNOSIS — F06.70 MILD NEUROCOGNITIVE DISORDER DUE TO MULTIPLE ETIOLOGIES: ICD-10-CM

## 2022-02-22 DIAGNOSIS — I69.351 HEMIPLEGIA AND HEMIPARESIS FOLLOWING CEREBRAL INFARCTION AFFECTING RIGHT DOMINANT SIDE: ICD-10-CM

## 2022-02-22 DIAGNOSIS — C90.02 MULTIPLE MYELOMA IN RELAPSE: ICD-10-CM

## 2022-02-22 LAB — VIT B12 SERPL-MCNC: 258 PG/ML (ref 210–950)

## 2022-02-22 PROCEDURE — 99215 PR OFFICE/OUTPT VISIT, EST, LEVL V, 40-54 MIN: ICD-10-PCS | Mod: HCNC,S$GLB,, | Performed by: PHYSICIAN ASSISTANT

## 2022-02-22 PROCEDURE — 1101F PT FALLS ASSESS-DOCD LE1/YR: CPT | Mod: HCNC,CPTII,S$GLB, | Performed by: PHYSICIAN ASSISTANT

## 2022-02-22 PROCEDURE — 1159F MED LIST DOCD IN RCRD: CPT | Mod: HCNC,CPTII,S$GLB, | Performed by: PHYSICIAN ASSISTANT

## 2022-02-22 PROCEDURE — 99999 PR PBB SHADOW E&M-EST. PATIENT-LVL III: ICD-10-PCS | Mod: PBBFAC,HCNC,, | Performed by: PHYSICIAN ASSISTANT

## 2022-02-22 PROCEDURE — 3075F SYST BP GE 130 - 139MM HG: CPT | Mod: HCNC,CPTII,S$GLB, | Performed by: PHYSICIAN ASSISTANT

## 2022-02-22 PROCEDURE — 3288F PR FALLS RISK ASSESSMENT DOCUMENTED: ICD-10-PCS | Mod: HCNC,CPTII,S$GLB, | Performed by: PHYSICIAN ASSISTANT

## 2022-02-22 PROCEDURE — 3079F DIAST BP 80-89 MM HG: CPT | Mod: HCNC,CPTII,S$GLB, | Performed by: PHYSICIAN ASSISTANT

## 2022-02-22 PROCEDURE — 3079F PR MOST RECENT DIASTOLIC BLOOD PRESSURE 80-89 MM HG: ICD-10-PCS | Mod: HCNC,CPTII,S$GLB, | Performed by: PHYSICIAN ASSISTANT

## 2022-02-22 PROCEDURE — 1160F PR REVIEW ALL MEDS BY PRESCRIBER/CLIN PHARMACIST DOCUMENTED: ICD-10-PCS | Mod: HCNC,CPTII,S$GLB, | Performed by: PHYSICIAN ASSISTANT

## 2022-02-22 PROCEDURE — 3052F HG A1C>EQUAL 8.0%<EQUAL 9.0%: CPT | Mod: HCNC,CPTII,S$GLB, | Performed by: PHYSICIAN ASSISTANT

## 2022-02-22 PROCEDURE — 1126F PR PAIN SEVERITY QUANTIFIED, NO PAIN PRESENT: ICD-10-PCS | Mod: HCNC,CPTII,S$GLB, | Performed by: PHYSICIAN ASSISTANT

## 2022-02-22 PROCEDURE — 84425 ASSAY OF VITAMIN B-1: CPT | Mod: HCNC | Performed by: PHYSICIAN ASSISTANT

## 2022-02-22 PROCEDURE — 1126F AMNT PAIN NOTED NONE PRSNT: CPT | Mod: HCNC,CPTII,S$GLB, | Performed by: PHYSICIAN ASSISTANT

## 2022-02-22 PROCEDURE — 99999 PR PBB SHADOW E&M-EST. PATIENT-LVL III: CPT | Mod: PBBFAC,HCNC,, | Performed by: PHYSICIAN ASSISTANT

## 2022-02-22 PROCEDURE — 99215 OFFICE O/P EST HI 40 MIN: CPT | Mod: HCNC,S$GLB,, | Performed by: PHYSICIAN ASSISTANT

## 2022-02-22 PROCEDURE — 3008F BODY MASS INDEX DOCD: CPT | Mod: HCNC,CPTII,S$GLB, | Performed by: PHYSICIAN ASSISTANT

## 2022-02-22 PROCEDURE — 3008F PR BODY MASS INDEX (BMI) DOCUMENTED: ICD-10-PCS | Mod: HCNC,CPTII,S$GLB, | Performed by: PHYSICIAN ASSISTANT

## 2022-02-22 PROCEDURE — 82607 VITAMIN B-12: CPT | Mod: HCNC | Performed by: PHYSICIAN ASSISTANT

## 2022-02-22 PROCEDURE — 1101F PR PT FALLS ASSESS DOC 0-1 FALLS W/OUT INJ PAST YR: ICD-10-PCS | Mod: HCNC,CPTII,S$GLB, | Performed by: PHYSICIAN ASSISTANT

## 2022-02-22 PROCEDURE — 3075F PR MOST RECENT SYSTOLIC BLOOD PRESS GE 130-139MM HG: ICD-10-PCS | Mod: HCNC,CPTII,S$GLB, | Performed by: PHYSICIAN ASSISTANT

## 2022-02-22 PROCEDURE — 3288F FALL RISK ASSESSMENT DOCD: CPT | Mod: HCNC,CPTII,S$GLB, | Performed by: PHYSICIAN ASSISTANT

## 2022-02-22 PROCEDURE — 3052F PR MOST RECENT HEMOGLOBIN A1C LEVEL 8.0 - < 9.0%: ICD-10-PCS | Mod: HCNC,CPTII,S$GLB, | Performed by: PHYSICIAN ASSISTANT

## 2022-02-22 PROCEDURE — 1159F PR MEDICATION LIST DOCUMENTED IN MEDICAL RECORD: ICD-10-PCS | Mod: HCNC,CPTII,S$GLB, | Performed by: PHYSICIAN ASSISTANT

## 2022-02-22 PROCEDURE — 1160F RVW MEDS BY RX/DR IN RCRD: CPT | Mod: HCNC,CPTII,S$GLB, | Performed by: PHYSICIAN ASSISTANT

## 2022-02-22 NOTE — PROGRESS NOTES
Name: Shelby Tohmpson  MRN: [id number]   CSN: [id number]      Date: 02/22/2022    Referring physician:  No referring provider defined for this encounter.    Chief Complaint: tremors     History of Present Illness (HPI): Shelby Thompson is a R HANDED 71 y.o. female with a medical issues significant for hemiplegia and hemiparesis 2/2 CVA, HLD, hx of breast cancer, PAD, MDD, budd-chiari syndrome, multiple myeloma DMII and COPD who presents for tremors. Accompanied by good friend. Started after stroke (3 total). First stroke was 11 years ago, most recent stroke she believes was about 7 years. Tremors started after this and has worsened in the past year - year and a half. Only in the R hand. Always present. She has to hold her arm down with her left hand. Affects her when she eats. Eating with her left hand. No tremors in her left hand. Balance issues since the strokes. Had a bone marrow transplant a few years ago. Notices tremor at rest as well. No falls.   When she was getting chemo, she thought she saw a doctor with a dog. Sees dogs. She states it has only happened twice but her friend believes it happens more than that. No scary hallucinations. Scheduled for neuropsychology testing March 7th.   Has albuterol inhaler PRN.   Currently taking gabapentin 600 mg BID.         Family History:  No family history of tremors      Neuroleptic Exposure: none       From Jan 2022 with Dr. Parra  This 71 year old woman, now under treatment for multiple myeloma (she had a prior bone marrow transplant, and now received chemotherapy) , has presented to the Clinic with a four year history of progressive problems with short term memory.  She states that she may forget objects at home, or have difficulty tracking appointments.  However, she also notes that she may lose her place in a conversation--forget what she was discussing,  The problems began two years before the patient's diagnosis of multiple myeloma.     The patient was  accompanied to the examination by a friend.  Her friend pointed out that while that patient was walking with her, two or three months ago, she pointed out the presence of a dog in their path.  There was no dog present, in reality.  The patient is uncertain as to details of the event.  This finding has not recurred. However, it is noted that both velcade (for multiple myeloma) and alirocumab (for lowering of cholesterol) may promote hallucinations.      Noted in the patients history, and under consideration as an etiology for memory issues, is that of a history of stroke.  The patient has a history of hemorrhagic stroke in 2008 (in the region of the thalamus), and a history of hemorrhagic stroke in 2011 (in the region of the left posterior frontal lobe).  Residual effects of these stroke events have included dysarthria (mild),, and right sided weakness.  The patient notes that over time, she has developed difficulty with walking distances due to strength and due to balance.  In the last year, the patient reports a tremor at the right arm that has made it difficult for her to write.  While she has been seen by neurologists in the past, records here do not indicate neurological followup in the [facility] system.      MRI scans of the brain were reviewed from 4-22-13 and 4-30-19.  They reveal bilateral basal ganglia stroke, and thalamic stroke.  The earlier study shows additional evidence of prior hemorrhage at the right posterior inferior temporal lobe and the left occipital lobe.  An MRI scan of the brain, without and with contrast, is pending.      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 The patient takes gabapentin for symptoms of discomfort with peripheral neuropathy.     The patient denies any recent stroke events, or TIA symptomatology.     Nonmotor/Premotor ROS:  Anosmia: normal    Dysarthria/Hypophonia: at baseline   Dysphagia/Sialorrhea: some sialorrhea if she talks too fast   Depression: none   Cognitive slowing: following with Dr. Parar   Hallucinations: as abpve   Urinary changes: none   Constipation: none   Falls: none   Micrographia: doesn't write much anymore   Sleep issues:  -RBD: none     Review of Systems:   Review of Systems   Constitutional: Negative for chills, fever and malaise/fatigue.   HENT: Negative for hearing loss.    Eyes: Negative for blurred vision and double vision.   Respiratory: Negative for cough, shortness of breath and stridor.    Cardiovascular: Negative for chest pain and leg swelling.   Gastrointestinal: Negative for constipation, diarrhea and nausea.   Genitourinary: Negative for frequency and urgency.   Musculoskeletal: Negative for falls.   Skin: Negative for itching and rash.   Neurological: Positive for tremors. Negative for dizziness, loss of consciousness and weakness.   Psychiatric/Behavioral: Positive for memory loss. Negative for hallucinations.           Past Medical History: The patient  has a past medical history of Acute respiratory failure with hypoxia (4/30/2019), FUENTES (acute kidney injury) (5/1/2019), Allergy, Anemia, Aortic aneurysm, Breast cancer (10/2011), Cataract, Chronic diastolic congestive heart failure (11/6/2015), Colon polyp, Community acquired pneumonia of right lower lobe of lung (8/3/2021), GERD (gastroesophageal reflux disease), Hiatal hernia, History of colonic polyps, psychiatric care, breast cancer, Hyperlipemia, Hypertension, ICH (intracerebral hemorrhage), Major depressive disorder, single episode, mild (6/23/2016), Nuclear sclerosis (7/21/2014), 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 (7/21/2014), PAD (peripheral artery disease) (11/6/2015), Psychiatric problem, Statin-induced rhabdomyolysis, Stroke (4/2011), Type 2 diabetes mellitus with diabetic peripheral angiopathy without gangrene,  with long-term current use of insulin (3/31/2021), Type 2 diabetes mellitus without complication, without long-term current use of insulin (2/10/2020), and Walker as ambulation aid.    Social History: The patient  reports that she quit smoking about 10 years ago. Her smoking use included cigarettes. She started smoking about 55 years ago. She has a 11.00 pack-year smoking history. She has never used smokeless tobacco. She reports previous alcohol use. She reports that she does not use drugs.    Family History: Their family history includes Cancer in her father; Cancer (age of onset: 63) in her sister; Dementia in her sister; Fibroids in her daughter; Hypertension in her daughter, maternal grandmother, and son; No Known Problems in her brother, brother, maternal aunt, maternal grandfather, maternal uncle, mother, paternal aunt, paternal grandfather, paternal grandmother, and paternal uncle.    Allergies: Lipitor [atorvastatin]     Meds:   Current Outpatient Medications on File Prior to Visit   Medication Sig Dispense Refill    acyclovir (ZOVIRAX) 400 MG tablet Take by mouth 2 (two) times daily.      albuterol (ACCUNEB) 1.25 mg/3 mL Nebu Take 3 mLs (1.25 mg total) by nebulization every 6 (six) hours as needed (wheeze/cough). Rescue 75 mL 2    albuterol (PROVENTIL HFA) 90 mcg/actuation inhaler Inhale 2 puffs into the lungs every 6 (six) hours as needed for Wheezing. Rescue 6.7 g 0    alirocumab (PRALUENT PEN) 75 mg/mL PnIj Inject 1 mL (75 mg total) into the skin every 14 (fourteen) days. 6 mL 3    amLODIPine (NORVASC) 5 MG tablet TAKE 1 TABLET BY MOUTH EVERY DAY 90 tablet 3    aspirin (ECOTRIN) 81 MG EC tablet Take 1 tablet (81 mg total) by mouth once daily. 90 tablet 3    benzonatate (TESSALON) 100 MG capsule Take 1 capsule (100 mg total) by mouth 3 (three) times daily as needed for Cough. 30 capsule 0    ENGERIX-B, PF, 20 mcg/mL Syrg       gabapentin (NEURONTIN) 300 MG capsule Take 2 capsules (600 mg total)  "by mouth 2 (two) times daily. 180 capsule 2    guaiFENesin (MUCINEX) 600 mg 12 hr tablet Take 1 tablet (600 mg total) by mouth 2 (two) times daily. 30 tablet 0    metFORMIN (GLUCOPHAGE) 1000 MG tablet Take 1 tablet (1,000 mg total) by mouth 2 (two) times daily with meals. 180 tablet 3    omeprazole (PRILOSEC) 40 MG capsule TAKE 1 CAPSULE BY MOUTH EVERY DAY 90 capsule 3    ondansetron (ZOFRAN-ODT) 4 MG TbDL Take 1 tablet (4 mg total) by mouth every 6 (six) hours as needed (nausea). 12 tablet 0    potassium chloride SA (K-DUR,KLOR-CON) 20 MEQ tablet TAKE 1 TABLET BY MOUTH ONCE DAILY 90 tablet 3    traMADoL (ULTRAM) 50 mg tablet Take 1 tablet (50 mg total) by mouth 2 (two) times daily as needed for Pain. 60 tablet 3    VELCADE 3.5 mg injection       zoledronic 4 mg/100 mL PgBk infusion        No current facility-administered medications on file prior to visit.       Exam:  /86   Pulse 64   Ht 5' 3" (1.6 m)   LMP  (LMP Unknown)   BMI 26.77 kg/m²     Constitutional  Well-developed, well-nourished, appears stated age   Ophthalmoscopic  No papilledema with no hemorrhages or exudates bilaterally   Cardiovascular  Radial pulses 2+ and symmetric, no LE edema bilaterally   Neurological    * Mental status  MOCA =      - Orientation  Oriented to person, place, time, and situation     - Memory   Intact recent and remote     - Attention/concentration  Attentive, vigilant during exam     - Language  Naming & repetition intact, +2-step commands     - Fund of knowledge  Aware of current events     - Executive  Well-organized thoughts     - Other     * Cranial nerves       - CN II  PERRL, visual fields full to confrontation     - CN III, IV, VI  Extraocular movements full, normal pursuits and saccades     - CN V  Sensation V1 - V3 intact     - CN VII  Face strong and symmetric bilaterally     - CN VIII  Hearing intact bilaterally     - CN IX, X  Palate raises midline and symmetric     - CN XI  SCM and trapezius 5/5 " bilaterally     - CN XII  Tongue midline   * Motor  Muscle bulk normal, strength 5/5 throughout   * Sensory   Intact to temperature, decreased vibratory sense to bilateral LEs up to the knees    * Coordination  severe R hand dysmetria with finger to nose    * Gait  See below.   * Deep tendon reflexes  2+ RUE    1+ LUE   diminished patellar and achilles reflexes    * Specialized movement exam  No hypophonic speech.    No facial masking.   No cogwheel rigidity, paratonia of RUE    severe R bradykinesia 2/2 tremor overlay and dis coordination     No motor impersistence.   slightly wide based gait, slow cautious gait    No shortened stride length.   No abnormal arm swing.     romberg present    high amplitude resting tremor of R hand    high amplitude, chaotic tremor of R hand, postural and kinetic, worsen in wing beating position and outstretched arm    outflow tremor, mild choreoathetosis of RUE as well    similarly there is mild choreoathetosis of RLE only on distraction      Laboratory/Radiological:  - Results:  Lab Visit on 02/17/2022   Component Date Value Ref Range Status    WBC 02/17/2022 3.22 (A) 3.90 - 12.70 K/uL Final    RBC 02/17/2022 4.27  4.00 - 5.40 M/uL Final    Hemoglobin 02/17/2022 10.5 (A) 12.0 - 16.0 g/dL Final    Hematocrit 02/17/2022 34.6 (A) 37.0 - 48.5 % Final    MCV 02/17/2022 81 (A) 82 - 98 fL Final    MCH 02/17/2022 24.6 (A) 27.0 - 31.0 pg Final    MCHC 02/17/2022 30.3 (A) 32.0 - 36.0 g/dL Final    RDW 02/17/2022 16.9 (A) 11.5 - 14.5 % Final    Platelets 02/17/2022 138 (A) 150 - 450 K/uL Final    MPV 02/17/2022 13.0 (A) 9.2 - 12.9 fL Final    Immature Granulocytes 02/17/2022 0.0  0.0 - 0.5 % Final    Gran # (ANC) 02/17/2022 1.9  1.8 - 7.7 K/uL Final    Immature Grans (Abs) 02/17/2022 0.00  0.00 - 0.04 K/uL Final    Lymph # 02/17/2022 0.9 (A) 1.0 - 4.8 K/uL Final    Mono # 02/17/2022 0.3  0.3 - 1.0 K/uL Final    Eos # 02/17/2022 0.1  0.0 - 0.5 K/uL Final    Baso # 02/17/2022  0.01  0.00 - 0.20 K/uL Final    nRBC 02/17/2022 0  0 /100 WBC Final    Gran % 02/17/2022 59.6  38.0 - 73.0 % Final    Lymph % 02/17/2022 28.9  18.0 - 48.0 % Final    Mono % 02/17/2022 8.7  4.0 - 15.0 % Final    Eosinophil % 02/17/2022 2.5  0.0 - 8.0 % Final    Basophil % 02/17/2022 0.3  0.0 - 1.9 % Final    Differential Method 02/17/2022 Automated   Final    Sodium 02/17/2022 145  136 - 145 mmol/L Final    Potassium 02/17/2022 4.1  3.5 - 5.1 mmol/L Final    Chloride 02/17/2022 107  95 - 110 mmol/L Final    CO2 02/17/2022 27  23 - 29 mmol/L Final    Glucose 02/17/2022 84  70 - 110 mg/dL Final    BUN 02/17/2022 10  8 - 23 mg/dL Final    Creatinine 02/17/2022 0.8  0.5 - 1.4 mg/dL Final    Calcium 02/17/2022 9.0  8.7 - 10.5 mg/dL Final    Total Protein 02/17/2022 7.2  6.0 - 8.4 g/dL Final    Albumin 02/17/2022 4.0  3.5 - 5.2 g/dL Final    Total Bilirubin 02/17/2022 0.6  0.1 - 1.0 mg/dL Final    Alkaline Phosphatase 02/17/2022 42 (A) 55 - 135 U/L Final    AST 02/17/2022 14  10 - 40 U/L Final    ALT 02/17/2022 6 (A) 10 - 44 U/L Final    Anion Gap 02/17/2022 11  8 - 16 mmol/L Final    eGFR if African American 02/17/2022 >60.0  >60 mL/min/1.73 m^2 Final    eGFR if non African American 02/17/2022 >60.0  >60 mL/min/1.73 m^2 Final    Immunofix Interp. 02/17/2022 SEE COMMENT   Final    Kappa Free Light Chains 02/17/2022 5.01 (A) 0.33 - 1.94 mg/dL Final    Lambda Free Light Chains 02/17/2022 1.63  0.57 - 2.63 mg/dL Final    Kappa/Lambda FLC Ratio 02/17/2022 3.07 (A) 0.26 - 1.65 Final    IgG 02/17/2022 932  650 - 1600 mg/dL Final    IgA 02/17/2022 58  40 - 350 mg/dL Final    IgM 02/17/2022 72  50 - 300 mg/dL Final    Protein, Serum 02/17/2022 7.1  6.0 - 8.4 g/dL Final    Albumin 02/17/2022 4.21  3.35 - 5.55 g/dL Final    Alpha-1 02/17/2022 0.35  0.17 - 0.41 g/dL Final    Alpha-2 02/17/2022 0.92  0.43 - 0.99 g/dL Final    Beta 02/17/2022 0.76  0.50 - 1.10 g/dL Final    Gamma 02/17/2022 0.87   0.67 - 1.58 g/dL Final    Pathologist Interpretation NATALY 02/17/2022 REVIEWED   Final    Pathologist Interpretation SPE 02/17/2022 REVIEWED   Final   Lab Visit on 01/20/2022   Component Date Value Ref Range Status    Hemoglobin A1C 01/20/2022 8.1 (A) 4.0 - 5.6 % Final    Estimated Avg Glucose 01/20/2022 186 (A) 68 - 131 mg/dL Final    Sodium 01/20/2022 143  136 - 145 mmol/L Final    Potassium 01/20/2022 4.0  3.5 - 5.1 mmol/L Final    Chloride 01/20/2022 106  95 - 110 mmol/L Final    CO2 01/20/2022 26  23 - 29 mmol/L Final    Glucose 01/20/2022 79  70 - 110 mg/dL Final    BUN 01/20/2022 12  8 - 23 mg/dL Final    Creatinine 01/20/2022 0.8  0.5 - 1.4 mg/dL Final    Calcium 01/20/2022 9.3  8.7 - 10.5 mg/dL Final    Anion Gap 01/20/2022 11  8 - 16 mmol/L Final    eGFR if African American 01/20/2022 >60.0  >60 mL/min/1.73 m^2 Final    eGFR if non African American 01/20/2022 >60.0  >60 mL/min/1.73 m^2 Final   Lab Visit on 01/13/2022   Component Date Value Ref Range Status    WBC 01/13/2022 3.94  3.90 - 12.70 K/uL Final    RBC 01/13/2022 4.15  4.00 - 5.40 M/uL Final    Hemoglobin 01/13/2022 10.3 (A) 12.0 - 16.0 g/dL Final    Hematocrit 01/13/2022 33.6 (A) 37.0 - 48.5 % Final    MCV 01/13/2022 81 (A) 82 - 98 fL Final    MCH 01/13/2022 24.8 (A) 27.0 - 31.0 pg Final    MCHC 01/13/2022 30.7 (A) 32.0 - 36.0 g/dL Final    RDW 01/13/2022 17.8 (A) 11.5 - 14.5 % Final    Platelets 01/13/2022 159  150 - 450 K/uL Final    MPV 01/13/2022 SEE COMMENT  9.2 - 12.9 fL Final    Immature Granulocytes 01/13/2022 0.0  0.0 - 0.5 % Final    Gran # (ANC) 01/13/2022 2.2  1.8 - 7.7 K/uL Final    Immature Grans (Abs) 01/13/2022 0.00  0.00 - 0.04 K/uL Final    Lymph # 01/13/2022 1.2  1.0 - 4.8 K/uL Final    Mono # 01/13/2022 0.4  0.3 - 1.0 K/uL Final    Eos # 01/13/2022 0.1  0.0 - 0.5 K/uL Final    Baso # 01/13/2022 0.02  0.00 - 0.20 K/uL Final    nRBC 01/13/2022 0  0 /100 WBC Final    Gran % 01/13/2022 56.4  38.0 -  73.0 % Final    Lymph % 01/13/2022 30.7  18.0 - 48.0 % Final    Mono % 01/13/2022 10.4  4.0 - 15.0 % Final    Eosinophil % 01/13/2022 2.0  0.0 - 8.0 % Final    Basophil % 01/13/2022 0.5  0.0 - 1.9 % Final    Differential Method 01/13/2022 Automated   Final    Sodium 01/13/2022 143  136 - 145 mmol/L Final    Potassium 01/13/2022 4.0  3.5 - 5.1 mmol/L Final    Chloride 01/13/2022 105  95 - 110 mmol/L Final    CO2 01/13/2022 26  23 - 29 mmol/L Final    Glucose 01/13/2022 79  70 - 110 mg/dL Final    BUN 01/13/2022 9  8 - 23 mg/dL Final    Creatinine 01/13/2022 0.8  0.5 - 1.4 mg/dL Final    Calcium 01/13/2022 9.5  8.7 - 10.5 mg/dL Final    Total Protein 01/13/2022 6.7  6.0 - 8.4 g/dL Final    Albumin 01/13/2022 3.7  3.5 - 5.2 g/dL Final    Total Bilirubin 01/13/2022 0.6  0.1 - 1.0 mg/dL Final    Alkaline Phosphatase 01/13/2022 50 (A) 55 - 135 U/L Final    AST 01/13/2022 17  10 - 40 U/L Final    ALT 01/13/2022 10  10 - 44 U/L Final    Anion Gap 01/13/2022 12  8 - 16 mmol/L Final    eGFR if African American 01/13/2022 >60.0  >60 mL/min/1.73 m^2 Final    eGFR if non African American 01/13/2022 >60.0  >60 mL/min/1.73 m^2 Final    Immunofix Interp. 01/13/2022 SEE COMMENT   Final    Kappa Free Light Chains 01/13/2022 3.77 (A) 0.33 - 1.94 mg/dL Final    Lambda Free Light Chains 01/13/2022 2.45  0.57 - 2.63 mg/dL Final    Kappa/Lambda FLC Ratio 01/13/2022 1.54  0.26 - 1.65 Final    IgG 01/13/2022 879  650 - 1600 mg/dL Final    IgA 01/13/2022 73  40 - 350 mg/dL Final    IgM 01/13/2022 117  50 - 300 mg/dL Final    Protein, Serum 01/13/2022 6.6  6.0 - 8.4 g/dL Final    Albumin 01/13/2022 3.72  3.35 - 5.55 g/dL Final    Alpha-1 01/13/2022 0.35  0.17 - 0.41 g/dL Final    Alpha-2 01/13/2022 0.96  0.43 - 0.99 g/dL Final    Beta 01/13/2022 0.72  0.50 - 1.10 g/dL Final    Gamma 01/13/2022 0.85  0.67 - 1.58 g/dL Final    Pathologist Interpretation SPE 01/13/2022 REVIEWED   Final    Pathologist  Interpretation NATALY 01/13/2022 REVIEWED   Final   Lab Visit on 12/16/2021   Component Date Value Ref Range Status    WBC 12/16/2021 6.64  3.90 - 12.70 K/uL Final    RBC 12/16/2021 4.10  4.00 - 5.40 M/uL Final    Hemoglobin 12/16/2021 10.2 (A) 12.0 - 16.0 g/dL Final    Hematocrit 12/16/2021 33.4 (A) 37.0 - 48.5 % Final    MCV 12/16/2021 82  82 - 98 fL Final    MCH 12/16/2021 24.9 (A) 27.0 - 31.0 pg Final    MCHC 12/16/2021 30.5 (A) 32.0 - 36.0 g/dL Final    RDW 12/16/2021 18.9 (A) 11.5 - 14.5 % Final    Platelets 12/16/2021 477 (A) 150 - 450 K/uL Final    MPV 12/16/2021 10.8  9.2 - 12.9 fL Final    Immature Granulocytes 12/16/2021 0.5  0.0 - 0.5 % Final    Gran # (ANC) 12/16/2021 5.4  1.8 - 7.7 K/uL Final    Immature Grans (Abs) 12/16/2021 0.03  0.00 - 0.04 K/uL Final    Lymph # 12/16/2021 0.4 (A) 1.0 - 4.8 K/uL Final    Mono # 12/16/2021 0.7  0.3 - 1.0 K/uL Final    Eos # 12/16/2021 0.1  0.0 - 0.5 K/uL Final    Baso # 12/16/2021 0.01  0.00 - 0.20 K/uL Final    nRBC 12/16/2021 0  0 /100 WBC Final    Gran % 12/16/2021 80.9 (A) 38.0 - 73.0 % Final    Lymph % 12/16/2021 6.5 (A) 18.0 - 48.0 % Final    Mono % 12/16/2021 10.5  4.0 - 15.0 % Final    Eosinophil % 12/16/2021 1.4  0.0 - 8.0 % Final    Basophil % 12/16/2021 0.2  0.0 - 1.9 % Final    Differential Method 12/16/2021 Automated   Final    Sodium 12/16/2021 140  136 - 145 mmol/L Final    Potassium 12/16/2021 3.9  3.5 - 5.1 mmol/L Final    Chloride 12/16/2021 101  95 - 110 mmol/L Final    CO2 12/16/2021 22 (A) 23 - 29 mmol/L Final    Glucose 12/16/2021 100  70 - 110 mg/dL Final    BUN 12/16/2021 23  8 - 23 mg/dL Final    Creatinine 12/16/2021 1.1  0.5 - 1.4 mg/dL Final    Calcium 12/16/2021 10.1  8.7 - 10.5 mg/dL Final    Total Protein 12/16/2021 7.4  6.0 - 8.4 g/dL Final    Albumin 12/16/2021 2.7 (A) 3.5 - 5.2 g/dL Final    Total Bilirubin 12/16/2021 0.3  0.1 - 1.0 mg/dL Final    Alkaline Phosphatase 12/16/2021 72  55 - 135 U/L  Final    AST 12/16/2021 22  10 - 40 U/L Final    ALT 12/16/2021 18  10 - 44 U/L Final    Anion Gap 12/16/2021 17 (A) 8 - 16 mmol/L Final    eGFR if  12/16/2021 58.0 (A) >60 mL/min/1.73 m^2 Final    eGFR if non  12/16/2021 50.3 (A) >60 mL/min/1.73 m^2 Final    Immunofix Interp. 12/16/2021 SEE COMMENT   Final    Kappa Free Light Chains 12/16/2021 4.66 (A) 0.33 - 1.94 mg/dL Final    Lambda Free Light Chains 12/16/2021 1.57  0.57 - 2.63 mg/dL Final    Kappa/Lambda FLC Ratio 12/16/2021 2.97 (A) 0.26 - 1.65 Final    IgG 12/16/2021 714  650 - 1600 mg/dL Final    IgA 12/16/2021 57  40 - 350 mg/dL Final    IgM 12/16/2021 41 (A) 50 - 300 mg/dL Final    Protein, Serum 12/16/2021 6.6  6.0 - 8.4 g/dL Final    Albumin 12/16/2021 2.90 (A) 3.35 - 5.55 g/dL Final    Alpha-1 12/16/2021 0.70 (A) 0.17 - 0.41 g/dL Final    Alpha-2 12/16/2021 1.54 (A) 0.43 - 0.99 g/dL Final    Beta 12/16/2021 0.82  0.50 - 1.10 g/dL Final    Gamma 12/16/2021 0.64 (A) 0.67 - 1.58 g/dL Final    Pathologist Interpretation NATALY 12/16/2021 REVIEWED   Final    Pathologist Interpretation SPE 12/16/2021 REVIEWED   Final   No results displayed because visit has over 200 results.          - Independent review of images:  Brain MRI from 2/2022      - Independent review of consultant's notes: [facility]    ASSESSMENT/PLAN:  1. Tremor   - RUE thalamic outflow tremor, 2/2 L thalamic infarct   - discussed different tremor medications and side effects, cannot have propranolol 2/2 COPD   - currently on gabapentin 600 mg BID without any tremor benefit  - primidone and topamax likely would worsen memory, scheduled to do neuropsych evaluation soon   - discussed the use of weighted utensils, may benefit from ready steady glove  - discussed that outflow tremors can be difficult to treat and that side effects of medications may outweigh any benefit (if any) we would get from some of the tremor medications.       2. Imbalance  -  sensory ataxia likely multifactorial 2/2 multiple myeloma, chemo-induced peripheral neuropathy  - rule out reversible causes contributing as well as below   - prior CVA also contributing to some imbalance       3. Memory Loss  - following with Dr. Parra  - scheduled to do neuropsych testing soon         Orders Placed This Encounter    Vitamin B12    Vitamin B1           Follow up: in 3 months with [facility]     Collaborating Physician, Dr. Johnson, was available during today's encounter. Any change to plan along with cosign to appear in the EMR.       Total time spent with the patient: 55 minutes.  40 minutes of face-to-face consultation and 15 minutes of chart review and coordination of care, on the day of the visit. This includes face to face time and non-face to face time preparing to see the patient (eg, review of tests), obtaining and/or reviewing separately obtained history, documenting clinical information in the electronic or other health record, independently interpreting resultsand communicating results to the patient/family/caregiver, or care coordination.         Becki Velarde PA-C   [facility]  Department of Neurology  Movement Disorders

## 2022-02-24 ENCOUNTER — INFUSION (OUTPATIENT)
Dept: INFUSION THERAPY | Facility: HOSPITAL | Age: 72
End: 2022-02-24
Payer: MEDICARE

## 2022-02-24 VITALS
RESPIRATION RATE: 18 BRPM | SYSTOLIC BLOOD PRESSURE: 141 MMHG | HEART RATE: 71 BPM | DIASTOLIC BLOOD PRESSURE: 72 MMHG | TEMPERATURE: 98 F

## 2022-02-24 DIAGNOSIS — C90.01 MULTIPLE MYELOMA IN REMISSION: Primary | ICD-10-CM

## 2022-02-24 DIAGNOSIS — C90.02 MULTIPLE MYELOMA IN RELAPSE: ICD-10-CM

## 2022-02-24 PROCEDURE — 96401 CHEMO ANTI-NEOPL SQ/IM: CPT | Mod: HCNC

## 2022-02-24 PROCEDURE — 63600175 PHARM REV CODE 636 W HCPCS: Mod: JG,HCNC | Performed by: INTERNAL MEDICINE

## 2022-02-24 RX ORDER — BORTEZOMIB 3.5 MG/1
0.7 INJECTION, POWDER, LYOPHILIZED, FOR SOLUTION INTRAVENOUS; SUBCUTANEOUS
Status: COMPLETED | OUTPATIENT
Start: 2022-02-24 | End: 2022-02-24

## 2022-02-24 RX ORDER — BORTEZOMIB 3.5 MG/1
0.7 INJECTION, POWDER, LYOPHILIZED, FOR SOLUTION INTRAVENOUS; SUBCUTANEOUS
Status: CANCELLED | OUTPATIENT
Start: 2022-02-24

## 2022-02-24 RX ORDER — DIPHENHYDRAMINE HYDROCHLORIDE 50 MG/ML
50 INJECTION INTRAMUSCULAR; INTRAVENOUS ONCE AS NEEDED
Status: DISCONTINUED | OUTPATIENT
Start: 2022-02-24 | End: 2022-02-24 | Stop reason: HOSPADM

## 2022-02-24 RX ORDER — BORTEZOMIB 3.5 MG/1
0.7 INJECTION, POWDER, LYOPHILIZED, FOR SOLUTION INTRAVENOUS; SUBCUTANEOUS
Status: CANCELLED | OUTPATIENT
Start: 2022-03-10

## 2022-02-24 RX ORDER — BORTEZOMIB 3.5 MG/1
0.7 INJECTION, POWDER, LYOPHILIZED, FOR SOLUTION INTRAVENOUS; SUBCUTANEOUS
Status: DISCONTINUED | OUTPATIENT
Start: 2022-02-24 | End: 2022-02-24

## 2022-02-24 RX ORDER — BORTEZOMIB 3.5 MG/1
0.7 INJECTION, POWDER, LYOPHILIZED, FOR SOLUTION INTRAVENOUS; SUBCUTANEOUS
Status: CANCELLED | OUTPATIENT
Start: 2022-03-03

## 2022-02-24 RX ORDER — EPINEPHRINE 0.3 MG/.3ML
0.3 INJECTION SUBCUTANEOUS ONCE AS NEEDED
Status: DISCONTINUED | OUTPATIENT
Start: 2022-02-24 | End: 2022-02-24 | Stop reason: HOSPADM

## 2022-02-24 RX ADMIN — BORTEZOMIB 1.2 MG: 3.5 INJECTION, POWDER, LYOPHILIZED, FOR SOLUTION INTRAVENOUS; SUBCUTANEOUS at 12:02

## 2022-02-24 NOTE — NURSING
Patient received Velcade injection SQ to ABD.  Tolerated well.  Vitals stable.  No apparent distress noted.  Patient assisted off unit via w/c.

## 2022-02-28 ENCOUNTER — OUTPATIENT CASE MANAGEMENT (OUTPATIENT)
Dept: ADMINISTRATIVE | Facility: OTHER | Age: 72
End: 2022-02-28
Payer: MEDICARE

## 2022-02-28 ENCOUNTER — PATIENT MESSAGE (OUTPATIENT)
Dept: ADMINISTRATIVE | Facility: OTHER | Age: 72
End: 2022-02-28
Payer: MEDICARE

## 2022-02-28 LAB — VIT B1 BLD-MCNC: 23 UG/L (ref 38–122)

## 2022-03-01 NOTE — PROGRESS NOTES
Outpatient Care Management  Plan of Care Follow Up Visit    Patient: Shelby Thompson  MRN: [id number]  Date of Service: 02/28/2022  Completed by: Meagan Allen RN  Referral Date: 12/05/2021  Program: Case Management (High Risk)    Reason for Visit   Patient presents with    Update Plan Of Care     2/28/2022    OPCM Chart Review     2/28/2022    Case Closure     2/28/2022       Brief Summary:      HLD-  MEMBER ACTION PLAN: Shelby agrees to continue modifying her fat intake. She agrees to case closure.   CM ACTION PLAN: Self-Management of HLD through Healthy Dietary Choices completed. 2/28/2022.      Metapneumovirus S/S-  MEMBER ACTION PLAN: Shelby continues to adhere to her medications and medical appts.   CM ACTION PLAN: Condition resolved. Metapneumovirus Care Plan completed.   O2 Safety--    Patient Summary     Involvement of Care:  Do I have permission to speak with other family members about your care?       Patient Reported Labs & Vitals:  1.  Any Patient Reported Labs & Vitals?     2.  Patient Reported Blood Pressure:     3.  Patient Reported Pulse:     4.  Patient Reported Weight (Kg):     5.  Patient Reported Blood Glucose (mg/dl):       Medical and social history was reviewed with patient and/or caregiver.     Clinical Assessment     Reviewed and provided basic information on available community resources for mental health, transportation, wellness resources, and palliative care programs with patient and/or caregiver.     Complex Care Plan     Care plan was discussed and completed today with input from patient and/or caregiver.    Patient Instructions     Instructions were provided via the [employer] patient resources and are available for the patient to view on the patient portal.          [facility] Self-Management Care Plan was developed with the patients/caregivers input and was based on identified barriers from todays assessment.  Goals were written today with the patient/caregiver and the  patient has agreed to work towards these goals to improve his/her overall well-being. Patient verbalized understanding of the care plan, goals, and all of today's instructions. Encouraged patient/caregiver to communicate with his/her physician and health care team about health conditions and the treatment plan.  Provided my contact information today and encouraged patient/caregiver to call me with any questions as needed.

## 2022-03-03 ENCOUNTER — PATIENT MESSAGE (OUTPATIENT)
Dept: NEUROLOGY | Facility: CLINIC | Age: 72
End: 2022-03-03
Payer: MEDICARE

## 2022-03-03 ENCOUNTER — INFUSION (OUTPATIENT)
Dept: INFUSION THERAPY | Facility: HOSPITAL | Age: 72
End: 2022-03-03
Payer: MEDICARE

## 2022-03-03 VITALS
RESPIRATION RATE: 16 BRPM | DIASTOLIC BLOOD PRESSURE: 67 MMHG | SYSTOLIC BLOOD PRESSURE: 149 MMHG | HEART RATE: 59 BPM | TEMPERATURE: 99 F

## 2022-03-03 DIAGNOSIS — C90.02 MULTIPLE MYELOMA IN RELAPSE: ICD-10-CM

## 2022-03-03 DIAGNOSIS — C90.01 MULTIPLE MYELOMA IN REMISSION: Primary | ICD-10-CM

## 2022-03-03 PROCEDURE — 96401 CHEMO ANTI-NEOPL SQ/IM: CPT | Mod: HCNC

## 2022-03-03 PROCEDURE — 63600175 PHARM REV CODE 636 W HCPCS: Mod: JW,JG,HCNC | Performed by: INTERNAL MEDICINE

## 2022-03-03 RX ORDER — BORTEZOMIB 3.5 MG/1
0.7 INJECTION, POWDER, LYOPHILIZED, FOR SOLUTION INTRAVENOUS; SUBCUTANEOUS
Status: COMPLETED | OUTPATIENT
Start: 2022-03-03 | End: 2022-03-03

## 2022-03-03 RX ADMIN — BORTEZOMIB 1.2 MG: 3.5 INJECTION, POWDER, LYOPHILIZED, FOR SOLUTION INTRAVENOUS; SUBCUTANEOUS at 10:03

## 2022-03-03 NOTE — NURSING
Patient received Velcade injection SQ to ABD.  Tolerated well.  Vitals stable.  No apparent distress noted.  Patient assisted off unit via w/c accompanied by friend.

## 2022-03-04 ENCOUNTER — SPECIALTY PHARMACY (OUTPATIENT)
Dept: PHARMACY | Facility: CLINIC | Age: 72
End: 2022-03-04
Payer: MEDICARE

## 2022-03-05 NOTE — PROGRESS NOTES
NEUROPSYCHOLOGY CONSULT   Referral Information  Name: Shelby Thompson  MRN: [id number]  : 1950  Age: 71 y.o.  Race: Black or   Gender: female  REFERRAL SOURCE: Greg Parra MD  DATE CONDUCTED: 3/7/2022  SOURCES OF INFORMATION:  The following was gathered from a clinical interview with Ms. Shelby Thompson, a separate interview with her , and review of the available medical records. Ms. Thompson expressed an understanding of the purpose of the evaluation and consented to all procedures. Total licensed billing psychologists professional time including clinical interview, test administration and interpretation of tests administered by the billing psychologist, integration of test results and other clinical data, preparing the final report, and personally reporting results to the patient   [place]illin - 60 minutes (2022), [id number] - 180 minutes (3/7/2022), [id number] - 262 minutes (3/7/2022)    NEUROPSYCHOLOGICAL EVALUATION - CONFIDENTIAL    SUMMARY/TREATMENT PLAN   Ms. Thompson is a 71 year old female with multiple strokes (, ), possible cerebral amyloid angiopathy, and multiple myeloma () s/p autologous stem cell transplant in  and currently maintained on Sravanthi/Noble/Dex. Ms. Thompson and family report post-stroke cognitive dysfunction (particularly after the second stroke), but with progressive decline since cancer diagnosis/treatment. Her  now provides support/oversight in all complex activities of daily living.    Neuropsychological testing was primarily consistent with a pattern of frontosubcortical dysfunction, highlighted by reduced cognitive organization/retrieval, processing speed, verbal fluency, naming, ability to maintain a complex set, and conceptual/abstract reasoning. Her test scores and functioning are at the level of mild cognitive impairment (MCI) and close to the threshold between MCI/dementia. Etiology appears multifactorial, including  cerebrovascular disease, post AutoSCT effects, and chemotherapy. She does not present with the type of duncan forgetting seen in Alzheimer's disease. Her recurring, vivid visual hallucinations do raise the possibility of Lewy Body pathology, although relatively low index of suspicion for DLB at this time. With that said, it may be difficult to assess for parkinsonism in the setting of post-stroke tremor/hemiballism. Importantly, she does not present with the duncan visuospatial dysfunction typically seen in early DLB. Regardless, it will be important to monitor her cognition/functioning over time. Several recommendations are offered to assist in her care/treatment.     Diagnoses  Problem List Items Addressed This Visit        Neuro    History of CVA with residual deficit    Mild neurocognitive disorder due to multiple etiologies    Current Assessment & Plan     Movement Neurology: Followed by TISH Velarde.      Behavioral Neurology: Referral placed.      Speech Therapy: Referral placed.      Neuropsychiatric Symptoms: Persistent visual hallucinations and irritability, both of which may be points of intervention. Referral to behavioral neurology.       Level of Care: Appropriately managed as her  is providing support in all complex activities of daily living.      Optimize Brain Health: Prioritize physical/social/cognitive activity/stimulation. Maintain a heart healthy diet.      Follow-up: Interval testing in 1-2 years.               Oncology    Multiple myeloma in remission      Other Visit Diagnoses     Mild cognitive impairment    -  Primary    Relevant Orders    Ambulatory referral/consult to Speech Therapy    Memory loss        Anomia        Relevant Orders    Ambulatory referral/consult to Speech Therapy         Ms. Thompson will be provided the results of the evaluation.     Thank you for allowing me to participate in Ms. Yi care.  If you have any questions, please contact me at  "[phone].    Milo Simms Psy.D., ABPP  Board Certified in Clinical Neuropsychology  Department of Neurology    HISTORY OF PRESENT ILLNESS: Ms. Shelby Thompson is a 71 y.o., right-handed, -American female with 12 years of education who was referred for a neuropsychological evaluation in the setting of multiple strokes and multiple myeloma. Per medical records from 2012: "She had intracerebral hemorrhage in 2008 and then a later one with both sides of the brain affected.  She had an ischemic stroke with right-sided numbness that developed in 2011.  The patient had a new diagnosis of breast cancer with breast surgery and radiation.  In late 2/2011, the patient developed right arm incoordination and imbalance in gait.  These symptoms have persisted to the present time. In 04/2011, the patient was admitted to [facility] with a new stroke, this time involving the left side.  The patient had symptoms of right-sided paresis, double vision and blurred vision and inability to ambulate due to poor balance." Impressions offered from 6/11 Brain MRI included: "EXPECTED INTERVAL EVOLUTION OF LEFT POSTERIOR FRONTAL PARENCHYMAL HEMORRHAGE. REMOTE LEFT THALAMIC HEMORRHAGE IS VISUALIZED TO BE PRESENT AND THIS OCCURRED IN THE INTERVAL FROM PREVIOUS EXAMINATION 2008."     Ms. Thompson reported cognitive and motor symptoms following the strokes. She was seen by Dr. Johnson in [facility], most recently in 2014, who noted that her motor symptoms were most consistent with "hemorrhagic thalamic outflow tremor +/- hemiballism." She also had mild right hemiparesis. She recently re-established care with movement neurology.     During the clinical interview, Ms. Thompson continually referred to 2019 as the time of her second stroke, while medical records document strokes in 2008 and 2011. She seemed to be referencing a hospitalization in 4/2019 that led to the diagnosis of multiple myeloma. Treatment course per medical " "records: "She achieved and tolerated well VRd x completing 7, 28 day cycles and achieving deep VGPR to induction. Then underwent Melphalan autologous stem cell transplant on 2/12/2020." Maintenance with velcade started on 5/2020. She relapsed about 1 year after transplant. Per medical records: "serial biochemical relapse  Noted and  given this and high risk CG    transitioned to weekly Sravanthi/Noble (1mg/m2) /dex  Salvage to which she is tolerating and responding to well as of 1/20/22 biochemical studies -velcade decreased to 1mg/m2 for grade 1 neuropathy which is stable."    Ms. Thompson reported progressive cognitive decline and is primarily frustrated by word finding difficulties, noting that she can become so upset that it brings her to tears. She also is prone to losing her train of thought. She has not participated in speech therapy in years. She reported memory loss, but had trouble providing examples of forgetfulness. She reported worsening right hemiparesis, to the extent that it's hard for her to hold a pencil in her right hand. She indicated that her oncologist told her that this may be related to chemotherapy. Ms. Thompson's  reported 2 primary time points of cognitive dysfunction. The first occurred after the second stroke in 2011, noting that she was unable to walk and talk in the immediate aftermath. Symptoms improved with time and therapies, but never returned to baseline. He reported further decline following diagnosis/treatment of multiple myeloma over the past several years. He now provides her support in all complex activities of daily living. He finds that she is prone to forgetting conversations and misplacing items. She also has a tendency to blame him when she can't find something, even though he knows she was the one who misplaced it. Similarly, she will decline an activity he offers (take a ride in the car), the kids will ask her why she didn't want to go for a ride, and she will say her "  never asked her.     Neuropsychiatric Symptoms:  Hallucinations: Endorsed, she reported visual hallucinations over the past 6-12 months. She tends to see people with animals, mainly in the distance. She recalled being at the hospital and seeing one of her doctors walking their dog in the distance when looking out the window. There are also times when she sees children playing in the street or someone playing with a dog on the porch. She recalled an few instances when she was convinced her 3 year old granddaughter was at the house, but she wasn't. He  corroborated hallucinations with recent onset while adding that they sometimes have malevolent intent. For instance, she will be looking at the house security system believing there are people outside of her house or putting something under her 's car.   Delusional/Paranoid Thinking: Denied  Irritability/Agitation: Endorsed, speech can be irritating. Also times when she is frustrated about not being able to remember everything she wants to tell her doctors. Her  also finds that she tends to direct her irritability/frustratoin towards him.   Depression/Labile Mood: Times when she feels down due to limitations, noting that she spends a lot of time at home. Her  finds that she tends to keep to herself when feeling down. He also notices that she tends to decline activities outside of the house.   Anxiety: intermittently.     DAILY FUNCTIONING:  BASIC ADLS:  Feeding: independent, appetite fluctuates. She may only have an appetite for breakfast.   Dressing: independent  Bathing: independent, she has a bench.     IADLS:  Support System: Resides with . Son lives in [place], [place]. Her daughter lives in [place].   Appointment Management: independent, she keeps a calendar for tracking appointments. She also adds dates for shots that she takes every 2 weeks.   Medication Compliance: Jointly with . She remembers most days, but he always  "reminds her when she forgets. She demonstrated a strong understanding of her current medication regimen.   Financial Management:  primarily manages. She continues to balance her checkbook, but he is paying the bills.   Cooking: Her  provides oversight to avoid safety issues. Her cooking is also limited by motor symptoms.   Driving: She stopped driving at least several years ago.     BRAIN HEALTH RISK FACTORS:  Hearing Loss: Denied  Falls: Denied, she uses a walker and wheelchair for longer distances.   Sleep: She denied problems with sleep initiation, falling asleep around 11pm. She wakes up twice during the night due to nocturia. She takes a nap most days.     MEDICAL HISTORY: Ms. 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 L breast cancer DCIS stage 0 (s/p lumpectomy and radiation, no endocrine tx due to CVA)    NEUROIMAGIN2022 Brain MRI: "Multifocal areas of remote parenchymal hemorrhage similar " "to prior exam.  Distribution primarily suggestive of chronic hypertension, but few peripheral cerebral foci do raise the possibility of underlying cerebral amyloid angiopathy in a patient of this age. Mild chronic microvascular ischemic change without evidence of recent or remote major vascular distribution infarct. 1.3 cm right posterior frontal arteriovenous malformation as further detailed above."    SUBSTANCE USE: Ms. Thompson  reports that she quit smoking cigarettes in 2011. She stopped drinking alcohol 3 years ago. No history of substance abuse.     CURRENT MEDICATIONS:    Current Outpatient Medications:     acyclovir (ZOVIRAX) 400 MG tablet, Take by mouth 2 (two) times daily., Disp: , Rfl:     albuterol (ACCUNEB) 1.25 mg/3 mL Nebu, Take 3 mLs (1.25 mg total) by nebulization every 6 (six) hours as needed (wheeze/cough). Rescue (Patient not taking: Reported on 3/17/2022), Disp: 75 mL, Rfl: 2    albuterol (PROVENTIL HFA) 90 mcg/actuation inhaler, Inhale 2 puffs into the lungs every 6 (six) hours as needed for Wheezing. Rescue, Disp: 6.7 g, Rfl: 0    alirocumab (PRALUENT PEN) 75 mg/mL PnIj, Inject 1 mL (75 mg total) into the skin every 14 (fourteen) days., Disp: 6 mL, Rfl: 3    amLODIPine (NORVASC) 5 MG tablet, TAKE 1 TABLET BY MOUTH EVERY DAY, Disp: 90 tablet, Rfl: 3    aspirin (ECOTRIN) 81 MG EC tablet, Take 1 tablet (81 mg total) by mouth once daily., Disp: 90 tablet, Rfl: 3    benzonatate (TESSALON) 100 MG capsule, Take 1 capsule (100 mg total) by mouth 3 (three) times daily as needed for Cough. (Patient not taking: Reported on 3/17/2022), Disp: 30 capsule, Rfl: 0    cholecalciferol, vitamin D3, 125 mcg (5,000 unit) Tab, 1 tablet DAILY (route: oral), Disp: , Rfl:     ENGERIX-B, PF, 20 mcg/mL Syrg, , Disp: , Rfl:     gabapentin (NEURONTIN) 300 MG capsule, Take 2 capsules (600 mg total) by mouth 2 (two) times daily., Disp: 180 capsule, Rfl: 2    guaiFENesin (MUCINEX) 600 mg 12 hr tablet, Take 1 " tablet (600 mg total) by mouth 2 (two) times daily. (Patient not taking: Reported on 3/17/2022), Disp: 30 tablet, Rfl: 0    metFORMIN (GLUCOPHAGE) 1000 MG tablet, Take 1 tablet (1,000 mg total) by mouth 2 (two) times daily with meals., Disp: 180 tablet, Rfl: 3    omeprazole (PRILOSEC) 40 MG capsule, TAKE 1 CAPSULE BY MOUTH EVERY DAY, Disp: 90 capsule, Rfl: 3    ondansetron (ZOFRAN-ODT) 4 MG TbDL, Take 1 tablet (4 mg total) by mouth every 6 (six) hours as needed (nausea). (Patient not taking: Reported on 3/17/2022), Disp: 12 tablet, Rfl: 0    potassium chloride SA (K-DUR,KLOR-CON) 20 MEQ tablet, TAKE 1 TABLET BY MOUTH ONCE DAILY, Disp: 90 tablet, Rfl: 3    traMADoL (ULTRAM) 50 mg tablet, Take 1 tablet (50 mg total) by mouth 2 (two) times daily as needed for Pain., Disp: 60 tablet, Rfl: 3    valsartan (DIOVAN) 80 MG tablet, Take 1 tablet (80 mg total) by mouth once daily., Disp: 90 tablet, Rfl: 3    VELCADE 3.5 mg injection, , Disp: , Rfl:     zoledronic 4 mg/100 mL PgBk infusion, , Disp: , Rfl:      PSYCHIATRIC HISTORY: Ms. Thompson described an unremarkable pre-morbid psychiatric history with no past mental health treatment. Medical records document past treatment of Zoloft for mild depression with no current treatment.     SUICIDAL IDEATION:  History: Denied  Active Suicidal Ideation:Denied  Plan/Intent: Denied    FAMILY HISTORY: family history includes Cancer in her father; Cancer (age of onset: 63) in her sister; Dementia in her sister; Fibroids in her daughter; Hypertension in her daughter, maternal grandmother, and son; No Known Problems in her brother, brother, maternal aunt, maternal grandfather, maternal uncle, mother, paternal aunt, paternal grandfather, paternal grandmother, and paternal uncle.    PSYCHOSOCIAL HISTORY:   Education:   Level Attained: High school graduate. 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     Retired: 2011    Relationship Status:   : yes, 21 years   : yes, 1x   Children: 2 (daughter and son)    MENTAL STATUS AND OBSERVATIONS:  APPEARANCE: Appropriately dressed/groomed.   ALERTNESS/ORIENTATION: Attentive and alert. There were inconsistencies between Ms. Thompson's history and medical records. For instance, she stated that she was working until 2019 while medical records document that she stopped working in 2011. Similarly, she stated that her second stroke occurred in 2019, while medical records note her second stroke was in 2011. Rather, she was diagnosed with multiple myeloma in 2019. She was able to accurately recall some recent events and upcoming events.   GAIT/MOTOR: Ambulated independently. Right hand tremor/fine motor difficulites.   SENSORY: Corrective lenses.   SPEECH/LANGUAGE: Normal in rate, rhythm, tone, and volume. Mildly shaky and hoarse. Expressive and receptive language were grossly intact.  STATED MOOD/AFFECT: Mood was euthymic.   INTERPERSONAL BEHAVIOR: Rapport was quickly and easily established   THOUGHT PROCESSES: Thoughts seemed logical and goal-directed.   VALIDITY/BEHAVIORAL OBSERVATIONS: Scores on a standalone PVT were WNL. 1/2 embedded PVTS were below expectation, which are more susceptible to individuals with genuine cognitive impairment. Overall, test results are believed to be a valid/reliable measure of her current cognitive abilities. Ms. Thompson had a delayed response latency during testing. She required frequent repetition/clarification of test instructions. She seemed to lose her train of thought in the middle of tests, requiring a reminder of the task.      APPENDIX/TEST RESULTS:  TESTS ADMINISTERED:  Clinical Interview and Review of Records, Reagan Cognitive Assessment (MoCA), MSVT, Test of Premorbid Functioning (TOPF), selected subtests from the Wechsler Adult Intelligence Scale - 4th edition (WAIS-IV, ACS  Demographically Adjusted Norms), Hopson Verbal Learning Test - Revised (HVLT-R), Logical Memory subtest from the WMS-IV ([facility] Demographically Adjusted Norms), Phan Judgment of Line Orientation, Naming subtest from the NAB, Controlled Oral Word Association Test ([facility] Norms), Animal Fluency ([facility] Norms), Symbol Digit Modalities Test (SDMT), [place] Card Sorting Test - 64 card version (WCST-64), Trailmaking Test ([facility] Norms), David Complex Figure (Copy Trial), Generalized Anxiety Disorder - 7 (PEYTON-7), and the Woodard Depression Scale - 2nd edition (BDI-2). Manual norms were used unless otherwise indicated.       Score Label T-Score Standard Score Z-Score Scaled Score %ile Rank   Exceptionally High > 70 > 130 > 2.0  > 16 > 98   Above Average 64-69 120-129 1.4-1.9 15 91-97   High Average 57-63 110-119 0.7-1.3 12-14 75-90   Average 44-56  0.6 to -0.6 8-11 25-74   Low Average 37-43 80-89 -1.3 to -0.7 6-7 9-24   Below Average 30-36 70-79 -2.0 to -1.4 4-5 2-8   Exceptionally Low < 30 < 70  < -2.0 < 4 < 2      Mental Status: Fully oriented to time and place.   3/2022 MoCA = 17/30    Pre-morbid/Baseline: Estimated to be the in low average/average range.      Language: Low average letter verbal fluency. Below average semantic verbal fluency. Exceptionally low performance on a test of confrontation naming. She offered several semantic paraphasic errors, including tiger for lion, cane/walker for crutch, and balloon for parachute. She benefit from phonemic cueing.      Visuospatial: Copy of a complex figure was impacted by involuntary movements. Otherwise, her copy demonstrated an intact appreciation for the gestalt of the figure with no apparent visuospatial dysfunction. Average performance on a visual puzzles test. Difficult to read her drawing of a clock face due to motor difficulties. Performance on a test of line orientation was mildly below expectation, but her incorrect responses were consistently very close to  the correct response.      Learning/Memory: Overall encoding of a surpaspan word list was low average as she recalled 3, 7, and 7 of 12 words across the learning trials. She did not freely recall any words following a long delay. Recognition was exceptionally low as she correctly identified 7/12 words with 2 false positive errors. She encoded 3/5 words after 2 trials on the MoCA. She did not freely recall any words following a brief delay. She recalled 2 words with categorical cueing and correctly identified 1/3 words with multiple choice cueing. Overall encoding of 2 short stories was low average. She retained 7/7 details from the first story following a long delay and 9/10 details from the second story. Her overall recall was average. Responses to yes/no questions pertaining to the stories was WNL.      Executive Functioning: One trial learning/encoding was below expectation. She provided 5/5 correct responses on a serial 7 subtraction task. Average performance on tests of working memory. Low average performance on a test of conceptual/abstract reasoning. Below average performance on a test requiring her to maintain a complex set. Exceptionally low performance on tests of psychomotor processing speed. Performance improved to the low average range when the motor component was removed from the task. Performance on a card sorting testing was mildly below expectation. She was able to shift from the first to the second category with minimal trouble, but perseverated on the second category for 20 of the last 24 trials of the test.      Mood: She denied all symptoms of anxiety on a self-report inventory. She did not endorse clinical depression.       Raw Score Type of Standardized Score Standardized Score Percentile/CP   MSVT IR 95 - - -   MSVT  - - -   MSVT Cons 95 - - -   ACS RDS 10 - - -   HVLT-R Recognition Discrimination 5 - - -   PREMORBID FUNCTIONING Raw Score Type of Standardized Score Standardized Score  Percentile/CP   TOPF simple dem. eFSIQ - SS 88 21   TOPF pred. eFSIQ - SS 89 23   TOPF simple + pred. eFSIQ - SS 86 18   INTELLECTUAL FUNCTIONING Raw Score Type of Standardized Score Standardized Score Percentile/CP   WAIS-IV       WMI - T 55    Similarities 13 T 38    Visual Puzzles 8 T 52    Digit Span 27 T 58          DS Forward 11 ss 12 75         DS Backward 10 ss 13 84         DS Sequence 6 ss 9 37         Longest Digit Forward 7 - - -         Longest Digit Backward 6 - - -         Longest Digit Sequence 5 - - -   Arithmetic 11 T 51    COGNITIVE SCREENING Raw Score Type of Standardized Score Standardized Score Percentile/CP   MoCA 17 - - -   Orientation - Place 2/2 - - -   Orientation - Date 4/4 - - -   LANGUAGE FUNCTIONING Raw Score Type of Standardized Score Standardized Score Percentile/CP   WAIS-IV Similarities 13 ss 5 5   TOPF Word Reading 26 SS 86 18   NAB Naming 22 Tscore 26 1   FAS 16 Tscore 37 9   Animal Naming 8 Tscore 35 7   VISUOSPATIAL FUNCTIONING Raw Score Type of Standardized Score Standardized Score Percentile/CP   Sylvester ANDERS 21 - -    RCFT Copy 19 - - <1   RCFT Time to Copy 600 - - <1   LEARNING & MEMORY Raw Score Type of Standardized Score Standardized Score Percentile/CP   HVLT-R       Total Immediate 17 Tscore 38 12   Delayed Recall 0 Tscore 20 0.1   Retention % 0 Tscore 20 0.1   Hits 7 - - -   False Positives 2 - - -   Discrimination  5 Tscore 21 0.2   WMS-IV Subtests       LM I 24 T 43    LM II 16 T 51    LM Recognition 19 - - 51-75   ATTENTION/WORKING MEMORY Raw Score Type of Standardized Score Standardized Score Percentile/CP   WAIS-IV WMI - SS 97 42   WAIS-IV Digit Span 27 ss 11 63         DS Forward 11 ss 12 75         DS Backward 10 ss 13 84         DS Sequence 6 ss 9 37         Longest Digit Forward 7 - - -         Longest Digit Backward 6 - - -         Longest Digit Sequence 5 - - -   WAIS-IV Arithmetic 11 ss 8 25   MENTAL PROCESSING SPEED Raw Score Type of Standardized Score  Standardized Score Percentile/CP   SMDT Written 15 zscore -2.03 2   SMDT Oral 30 zscore -1.07 16   TMT A  111 Tscore 27 1   TMT A errors 0 - - -   EXECUTIVE FUNCTIONING Raw Score Type of Standardized Score Standardized Score Percentile/CP   TMT B 299 Tscore 32 4   TMT B errors 0 - - -   WCST-64       Total Correct 33 SS - -   Total Errors 31 SS 84 14   Perseverative Resp. 29 SS 81 10   Perseverative Err. 23 SS 81 10   Nonperseverative Err. 8 SS 93 32   Concept. Level Response 23 SS 80 9   Categories Completed 2 - - >16   FMS 0 - - N/A   Learning to Learn -11.33 - - >16   WAIS-IV Similarities 13 ss 5 5   MOOD & PERSONALITY Raw Score Type of Standardized Score Standardized Score Percentile/CP   BDI-2 9 - - -   PEYTON-7 0 - - -

## 2022-03-07 ENCOUNTER — OFFICE VISIT (OUTPATIENT)
Dept: NEUROLOGY | Facility: CLINIC | Age: 72
End: 2022-03-07
Payer: MEDICARE

## 2022-03-07 DIAGNOSIS — C90.01 MULTIPLE MYELOMA IN REMISSION: ICD-10-CM

## 2022-03-07 DIAGNOSIS — I69.30 HISTORY OF CVA WITH RESIDUAL DEFICIT: ICD-10-CM

## 2022-03-07 DIAGNOSIS — F06.70 MILD NEUROCOGNITIVE DISORDER DUE TO MULTIPLE ETIOLOGIES: ICD-10-CM

## 2022-03-07 DIAGNOSIS — R48.8 ANOMIA: ICD-10-CM

## 2022-03-07 DIAGNOSIS — R41.3 MEMORY LOSS: ICD-10-CM

## 2022-03-07 DIAGNOSIS — G31.84 MILD COGNITIVE IMPAIRMENT: Primary | ICD-10-CM

## 2022-03-07 PROCEDURE — 96139 PR PSYCH/NEUROPSYCH TEST ADMIN/SCORING, BY TECH, 2+ TESTS, EA ADDTL 30 MIN: ICD-10-PCS | Mod: S$GLB,,, | Performed by: PSYCHIATRY & NEUROLOGY

## 2022-03-07 PROCEDURE — 96138 PSYCL/NRPSYC TECH 1ST: CPT | Mod: S$GLB,,, | Performed by: PSYCHIATRY & NEUROLOGY

## 2022-03-07 PROCEDURE — 99999 PR PBB SHADOW E&M-EST. PATIENT-LVL II: CPT | Mod: PBBFAC,,,

## 2022-03-07 PROCEDURE — 96132 PR NEUROPSYCHOLOGIC TEST EVAL SVCS, 1ST HR: ICD-10-PCS | Mod: S$GLB,,, | Performed by: PSYCHIATRY & NEUROLOGY

## 2022-03-07 PROCEDURE — 96138 PR PSYCH/NEUROPSYCH TEST ADMIN/SCORING, BY TECH, 2+ TESTS, 1ST 30 MIN: ICD-10-PCS | Mod: S$GLB,,, | Performed by: PSYCHIATRY & NEUROLOGY

## 2022-03-07 PROCEDURE — 99499 UNLISTED E&M SERVICE: CPT | Mod: S$GLB,,, | Performed by: PSYCHIATRY & NEUROLOGY

## 2022-03-07 PROCEDURE — 99999 PR PBB SHADOW E&M-EST. PATIENT-LVL II: ICD-10-PCS | Mod: PBBFAC,,,

## 2022-03-07 PROCEDURE — 96139 PSYCL/NRPSYC TST TECH EA: CPT | Mod: S$GLB,,, | Performed by: PSYCHIATRY & NEUROLOGY

## 2022-03-07 PROCEDURE — 4010F ACE/ARB THERAPY RXD/TAKEN: CPT | Mod: CPTII,S$GLB,, | Performed by: PSYCHIATRY & NEUROLOGY

## 2022-03-07 PROCEDURE — 99499 NO LOS: ICD-10-PCS | Mod: S$GLB,,, | Performed by: PSYCHIATRY & NEUROLOGY

## 2022-03-07 PROCEDURE — 3044F PR MOST RECENT HEMOGLOBIN A1C LEVEL <7.0%: ICD-10-PCS | Mod: CPTII,S$GLB,, | Performed by: PSYCHIATRY & NEUROLOGY

## 2022-03-07 PROCEDURE — 96132 NRPSYC TST EVAL PHYS/QHP 1ST: CPT | Mod: S$GLB,,, | Performed by: PSYCHIATRY & NEUROLOGY

## 2022-03-07 PROCEDURE — 96133 NRPSYC TST EVAL PHYS/QHP EA: CPT | Mod: S$GLB,,, | Performed by: PSYCHIATRY & NEUROLOGY

## 2022-03-07 PROCEDURE — 96133 PR NEUROPSYCHOLOGIC TEST EVAL SVCS, EA ADDTL HR: ICD-10-PCS | Mod: S$GLB,,, | Performed by: PSYCHIATRY & NEUROLOGY

## 2022-03-07 PROCEDURE — 4010F PR ACE/ARB THEARPY RXD/TAKEN: ICD-10-PCS | Mod: CPTII,S$GLB,, | Performed by: PSYCHIATRY & NEUROLOGY

## 2022-03-07 PROCEDURE — 3044F HG A1C LEVEL LT 7.0%: CPT | Mod: CPTII,S$GLB,, | Performed by: PSYCHIATRY & NEUROLOGY

## 2022-03-08 ENCOUNTER — OFFICE VISIT (OUTPATIENT)
Dept: INTERNAL MEDICINE | Facility: CLINIC | Age: 72
End: 2022-03-08
Payer: MEDICARE

## 2022-03-08 ENCOUNTER — EXTERNAL HOME HEALTH (OUTPATIENT)
Dept: HOME HEALTH SERVICES | Facility: HOSPITAL | Age: 72
End: 2022-03-08
Payer: MEDICARE

## 2022-03-08 VITALS
HEIGHT: 63 IN | BODY MASS INDEX: 27.15 KG/M2 | SYSTOLIC BLOOD PRESSURE: 142 MMHG | DIASTOLIC BLOOD PRESSURE: 86 MMHG | HEART RATE: 81 BPM | WEIGHT: 153.25 LBS | OXYGEN SATURATION: 95 %

## 2022-03-08 DIAGNOSIS — E11.51 TYPE 2 DIABETES MELLITUS WITH DIABETIC PERIPHERAL ANGIOPATHY WITHOUT GANGRENE, WITH LONG-TERM CURRENT USE OF INSULIN: ICD-10-CM

## 2022-03-08 DIAGNOSIS — F33.42 RECURRENT MAJOR DEPRESSIVE DISORDER, IN FULL REMISSION: ICD-10-CM

## 2022-03-08 DIAGNOSIS — D69.6 THROMBOCYTOPENIA, UNSPECIFIED: ICD-10-CM

## 2022-03-08 DIAGNOSIS — Z79.4 TYPE 2 DIABETES MELLITUS WITH DIABETIC PERIPHERAL ANGIOPATHY WITHOUT GANGRENE, WITH LONG-TERM CURRENT USE OF INSULIN: ICD-10-CM

## 2022-03-08 DIAGNOSIS — I50.32 CHRONIC DIASTOLIC HEART FAILURE: ICD-10-CM

## 2022-03-08 DIAGNOSIS — E11.9 TYPE 2 DIABETES MELLITUS WITHOUT COMPLICATION, WITHOUT LONG-TERM CURRENT USE OF INSULIN: Primary | ICD-10-CM

## 2022-03-08 DIAGNOSIS — I10 ESSENTIAL HYPERTENSION: ICD-10-CM

## 2022-03-08 PROCEDURE — 99214 PR OFFICE/OUTPT VISIT, EST, LEVL IV, 30-39 MIN: ICD-10-PCS | Mod: HCNC,S$GLB,, | Performed by: INTERNAL MEDICINE

## 2022-03-08 PROCEDURE — 3052F PR MOST RECENT HEMOGLOBIN A1C LEVEL 8.0 - < 9.0%: ICD-10-PCS | Mod: HCNC,CPTII,S$GLB, | Performed by: INTERNAL MEDICINE

## 2022-03-08 PROCEDURE — 99214 OFFICE O/P EST MOD 30 MIN: CPT | Mod: HCNC,S$GLB,, | Performed by: INTERNAL MEDICINE

## 2022-03-08 PROCEDURE — 99499 RISK ADDL DX/OHS AUDIT: ICD-10-PCS | Mod: HCNC,S$GLB,,

## 2022-03-08 PROCEDURE — 99999 PR PBB SHADOW E&M-EST. PATIENT-LVL IV: ICD-10-PCS | Mod: PBBFAC,HCNC,, | Performed by: INTERNAL MEDICINE

## 2022-03-08 PROCEDURE — 3052F HG A1C>EQUAL 8.0%<EQUAL 9.0%: CPT | Mod: HCNC,CPTII,S$GLB, | Performed by: INTERNAL MEDICINE

## 2022-03-08 PROCEDURE — 99499 UNLISTED E&M SERVICE: CPT | Mod: HCNC,S$GLB,,

## 2022-03-08 PROCEDURE — 99999 PR PBB SHADOW E&M-EST. PATIENT-LVL IV: CPT | Mod: PBBFAC,HCNC,, | Performed by: INTERNAL MEDICINE

## 2022-03-08 RX ORDER — VALSARTAN 80 MG/1
80 TABLET ORAL DAILY
Qty: 90 TABLET | Refills: 3 | Status: SHIPPED | OUTPATIENT
Start: 2022-03-08 | End: 2022-06-19 | Stop reason: SDUPTHER

## 2022-03-08 NOTE — PROGRESS NOTES
Clinic Note  3/8/2022      Subjective:       Patient ID:  Shelby is a 71 y.o. female being seen for an established care visit.    Chief Complaint: Follow-up    70 yo F with multiple myeloma (follows with Heme/Onc, s/p stem cell transplant Feb 2020), T2DM, thrombocytopenia, depression, drug induced neuropathy, chronic diastolic heart failure, hx of stroke with hemiplegia on right side, HLD, anemia, colon polyps who presents for follow up. She is accompanied by her friend.     Fatigue: less fatigue than before, usually fatigued after chemo  Increased appetite from before, eats 3 meals per day, consuming ice cream about 3x/week per Dr. Arevalo's advice from last visit. Sometimes will eat only 2 meals if she is sleeping.  Weight loss/gain: 5 lb loss since last seen in December.   Cut back on chemo dose (Velcade)  Cough/sore throat: resolved, no dyspnea. Uses oxygen at home 1-2 times/week. Used to use in every day after leaving hospital. Can walk without getting short of breath. Gets oxygen during chemo  Uses walker to get around at the house, sometimes goes out without walker. Ambulating well.     Wt Readings from Last 9 Encounters:  03/08/22 : 69.5 kg (153 lb 3.5 oz)  02/17/22 : 68.6 kg (151 lb 2 oz)  01/27/22 : 69.1 kg (152 lb 5.4 oz)  01/20/22 : 69.1 kg (152 lb 5.4 oz)  01/20/22 : 69.1 kg (152 lb 5.4 oz)  12/17/21 : 72 kg (158 lb 11.7 oz)  12/16/21 : 72 kg (158 lb 9.9 oz)  12/06/21 : 81.2 kg (179 lb)  12/03/21 : 77.4 kg (170 lb 10.2 oz)      Chemo every Thursday for multiple myeloma for the past year. No N/V. Usually tired after chemo on thursdays. Not as bad last week.   Right hand twitching, chronic.   Still able to cook.  helps with med administration and keeping track of blood sugars.   Elevates feet for leg swelling, uses compression stockings.     Anemia: most recent Hgb 10  HTN: home BP runs in 140s/80s. In 140s this visit.   DM: most recent A1c 8.1. On Metformin 1000 BID. Blood sugar at home 102 on Feb  28, left blood sugar log at home.   HLD with Statin Intolerance: on praluent, PCSK9 inhibitor    Care gaps:   Shingles vaccine: never taken, wants to defer to Dr. Rhodes to make sure it's okay with her chemo  Covid booster: due, but wants to defer to Dr. Rhodes        Review of Systems   Constitutional: Negative for fever.   HENT: Negative for sore throat.    Eyes: Negative for blurred vision.   Respiratory: Negative for cough and shortness of breath.    Cardiovascular: Positive for leg swelling. Negative for chest pain.   Gastrointestinal: Negative for abdominal pain, constipation, diarrhea, nausea and vomiting.   Genitourinary: Negative for dysuria.   Musculoskeletal: Positive for myalgias.   Neurological: Negative for headaches.   Psychiatric/Behavioral: Negative for depression.       Medication List with Changes/Refills   New Medications    VALSARTAN (DIOVAN) 80 MG TABLET    Take 1 tablet (80 mg total) by mouth once daily.   Current Medications    ACYCLOVIR (ZOVIRAX) 400 MG TABLET    Take by mouth 2 (two) times daily.    ALBUTEROL (ACCUNEB) 1.25 MG/3 ML NEBU    Take 3 mLs (1.25 mg total) by nebulization every 6 (six) hours as needed (wheeze/cough). Rescue    ALBUTEROL (PROVENTIL HFA) 90 MCG/ACTUATION INHALER    Inhale 2 puffs into the lungs every 6 (six) hours as needed for Wheezing. Rescue    ALIROCUMAB (PRALUENT PEN) 75 MG/ML PNIJ    Inject 1 mL (75 mg total) into the skin every 14 (fourteen) days.    AMLODIPINE (NORVASC) 5 MG TABLET    TAKE 1 TABLET BY MOUTH EVERY DAY    ASPIRIN (ECOTRIN) 81 MG EC TABLET    Take 1 tablet (81 mg total) by mouth once daily.    BENZONATATE (TESSALON) 100 MG CAPSULE    Take 1 capsule (100 mg total) by mouth 3 (three) times daily as needed for Cough.    ENGERIX-B, PF, 20 MCG/ML SYRG        GABAPENTIN (NEURONTIN) 300 MG CAPSULE    Take 2 capsules (600 mg total) by mouth 2 (two) times daily.    GUAIFENESIN (MUCINEX) 600 MG 12 HR TABLET    Take 1 tablet (600 mg total) by mouth  2 (two) times daily.    METFORMIN (GLUCOPHAGE) 1000 MG TABLET    Take 1 tablet (1,000 mg total) by mouth 2 (two) times daily with meals.    OMEPRAZOLE (PRILOSEC) 40 MG CAPSULE    TAKE 1 CAPSULE BY MOUTH EVERY DAY    ONDANSETRON (ZOFRAN-ODT) 4 MG TBDL    Take 1 tablet (4 mg total) by mouth every 6 (six) hours as needed (nausea).    POTASSIUM CHLORIDE SA (K-DUR,KLOR-CON) 20 MEQ TABLET    TAKE 1 TABLET BY MOUTH ONCE DAILY    TRAMADOL (ULTRAM) 50 MG TABLET    Take 1 tablet (50 mg total) by mouth 2 (two) times daily as needed for Pain.    VELCADE 3.5 MG INJECTION        ZOLEDRONIC 4 MG/100 ML PGBK INFUSION           Patient Active Problem List   Diagnosis    Personal history of malignant neoplasm of breast    Mononeuritis of upper limb    Hyperlipemia    Gastropathy    Tremor    Nuclear sclerosis    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    Thoracic aortic aneurysm without rupture    Aortic atherosclerosis    History of CVA with residual deficit    Osteopenia    PAD (peripheral artery disease)    Liver mass    Recurrent major depressive disorder, in full remission    Statin intolerance    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)    Pulmonary heart disease    Ectatic aorta    Tortuous aorta    Acute respiratory failure with hypoxia    Budd-Chiari syndrome    PVC (premature ventricular contraction)    Multiple myeloma in relapse    Metastatic multiple myeloma to bone    Status post autologous bone marrow transplant    Abnormal PFT    Infection due to human metapneumovirus (hMPV)    Iron deficiency anemia due to chronic blood loss    History of colon polyps    Drug-induced polyneuropathy    Essential hypertension    Type 2 diabetes mellitus without complication, without long-term current use of insulin    Multiple myeloma in remission    Anemia in neoplastic disease    Hemiplegia and hemiparesis following cerebral infarction affecting  "right dominant side    Abnormal findings on diagnostic imaging of liver and biliary tract     Type 2 diabetes mellitus with diabetic peripheral angiopathy without gangrene, with long-term current use of insulin    History of auto stem cell transplant    Community acquired pneumonia of right lower lobe of lung    Thrombocytopenia, unspecified    Chronic obstructive pulmonary disease, unspecified COPD type    Mild neurocognitive disorder due to multiple etiologies           Objective:      BP (!) 142/86 (BP Location: Right arm, Patient Position: Sitting, BP Method: Large (Manual))   Pulse 81   Ht 5' 3" (1.6 m)   Wt 69.5 kg (153 lb 3.5 oz)   LMP  (LMP Unknown)   SpO2 95%   BMI 27.14 kg/m²   Estimated body mass index is 27.14 kg/m² as calculated from the following:    Height as of this encounter: 5' 3" (1.6 m).    Weight as of this encounter: 69.5 kg (153 lb 3.5 oz).  Physical Exam  Constitutional:       Appearance: Normal appearance.      Comments: Pleasant, in good spirits   HENT:      Head: Normocephalic and atraumatic.      Nose: Nose normal.      Mouth/Throat:      Mouth: Mucous membranes are moist.   Eyes:      General: No scleral icterus.     Conjunctiva/sclera: Conjunctivae normal.   Cardiovascular:      Rate and Rhythm: Normal rate and regular rhythm.      Pulses: Normal pulses.      Heart sounds: Normal heart sounds. No murmur heard.  Pulmonary:      Effort: Pulmonary effort is normal. No respiratory distress.      Breath sounds: Normal breath sounds. No wheezing or rales.   Abdominal:      General: Abdomen is flat. There is no distension.      Palpations: Abdomen is soft.      Tenderness: There is no abdominal tenderness. There is no guarding.   Musculoskeletal:         General: No swelling, tenderness or deformity.      Cervical back: Normal range of motion. No rigidity.      Right lower leg: No edema.      Left lower leg: No edema.      Comments: Has compression stockings on   Skin:     " General: Skin is warm and dry.      Coloration: Skin is not jaundiced.   Neurological:      General: No focal deficit present.      Mental Status: She is alert and oriented to person, place, and time.   Psychiatric:         Mood and Affect: Mood normal.         Behavior: Behavior normal.           Assessment and Plan:         Problem List Items Addressed This Visit        Cardiac/Vascular    Essential hypertension    Current Assessment & Plan     Blood pressure are in 130s-140s systolic. On Amlodipine 5mg daily.     - Occasionally gets leg swelling.   - Added valsartan 80  - follow up in 6 weeks  - advised to keep BP log           Relevant Medications    valsartan (DIOVAN) 80 MG tablet       Endocrine    Type 2 diabetes mellitus without complication, without long-term current use of insulin - Primary    Current Assessment & Plan     Patient's home blood glucose okay, HbA1c 8.1 in January, improved from 12.5 in November.     - HbA1c add on to March 17 labs  - planning to see ophthalmology in April            Relevant Orders    HEMOGLOBIN A1C          Follow Up:   Follow up in about 6 weeks (around 4/19/2022) for follow up for blood pressure control. Recently started Valsartan .        Fabiana Sanchez      Signed:   Fabiana Sanchez MD - PGY1  Internal Medicine  [facility]

## 2022-03-08 NOTE — ASSESSMENT & PLAN NOTE
Blood pressure are in 130s-140s systolic. On Amlodipine 5mg daily.     - Occasionally gets leg swelling.   - Added valsartan 80  - follow up in 6 weeks  - advised to keep BP log

## 2022-03-08 NOTE — ASSESSMENT & PLAN NOTE
Patient's home blood glucose okay, HbA1c 8.1 in January, improved from 12.5 in November.     - HbA1c add on to March 17 labs  - planning to see ophthalmology in April

## 2022-03-08 NOTE — PATIENT INSTRUCTIONS
Please keep and bring a log of home blood pressures and blood sugars.   Obtain labs  Start valsartan.

## 2022-03-09 NOTE — PROGRESS NOTES
"  This note was generated with [employer] voice recognition software. I apologize for any possible typographical errors.      I have personally taken the history and examined this patient and agree with the resident's note as stated above with the following thoughts:  BP (!) 142/86 (BP Location: Right arm, Patient Position: Sitting, BP Method: Large (Manual))   Pulse 81   Ht 5' 3" (1.6 m)   Wt 69.5 kg (153 lb 3.5 oz)   LMP  (LMP Unknown)   SpO2 95%   BMI 27.14 kg/m²     Discussed getting vaccines up to date.  Discussed importance of low fat diet and exercise     She is looking a lot better from last time.  I realize she has lost 5 more lb in last 3 months but she is he is eating better and her sugars are better controlled now.  Her chemotherapy is moving out to once monthly she tells me now.  She continues following with Hematology-Oncology.  Her depression is also doing much better and she remains in remission with this.  A recheck a hemoglobin A1c and I will see her again in about 4 months.  Will add the hemoglobin A1c to labs she already has scheduled.  Her blood pressure is elevated so we are going to put her on some valsartan which she was on in the past.  "

## 2022-03-10 ENCOUNTER — INFUSION (OUTPATIENT)
Dept: INFUSION THERAPY | Facility: HOSPITAL | Age: 72
End: 2022-03-10
Payer: MEDICARE

## 2022-03-10 VITALS — DIASTOLIC BLOOD PRESSURE: 74 MMHG | SYSTOLIC BLOOD PRESSURE: 159 MMHG | RESPIRATION RATE: 18 BRPM | HEART RATE: 73 BPM

## 2022-03-10 DIAGNOSIS — C90.02 MULTIPLE MYELOMA IN RELAPSE: ICD-10-CM

## 2022-03-10 DIAGNOSIS — C90.01 MULTIPLE MYELOMA IN REMISSION: Primary | ICD-10-CM

## 2022-03-10 PROCEDURE — 96401 CHEMO ANTI-NEOPL SQ/IM: CPT | Mod: HCNC

## 2022-03-10 PROCEDURE — 63600175 PHARM REV CODE 636 W HCPCS: Mod: JG,HCNC | Performed by: INTERNAL MEDICINE

## 2022-03-10 RX ORDER — DIPHENHYDRAMINE HCL 25 MG
25 CAPSULE ORAL
Status: CANCELLED | OUTPATIENT
Start: 2022-03-17

## 2022-03-10 RX ORDER — DIPHENHYDRAMINE HYDROCHLORIDE 50 MG/ML
50 INJECTION INTRAMUSCULAR; INTRAVENOUS ONCE AS NEEDED
Status: CANCELLED | OUTPATIENT
Start: 2022-03-17

## 2022-03-10 RX ORDER — BORTEZOMIB 3.5 MG/1
0.7 INJECTION, POWDER, LYOPHILIZED, FOR SOLUTION INTRAVENOUS; SUBCUTANEOUS
Status: CANCELLED | OUTPATIENT
Start: 2022-03-10

## 2022-03-10 RX ORDER — ACETAMINOPHEN 325 MG/1
650 TABLET ORAL
Status: CANCELLED | OUTPATIENT
Start: 2022-03-17

## 2022-03-10 RX ORDER — DIPHENHYDRAMINE HYDROCHLORIDE 50 MG/ML
50 INJECTION INTRAMUSCULAR; INTRAVENOUS ONCE AS NEEDED
Status: CANCELLED | OUTPATIENT
Start: 2022-03-10

## 2022-03-10 RX ORDER — DIPHENHYDRAMINE HYDROCHLORIDE 50 MG/ML
50 INJECTION INTRAMUSCULAR; INTRAVENOUS ONCE AS NEEDED
Status: DISCONTINUED | OUTPATIENT
Start: 2022-03-10 | End: 2022-03-10 | Stop reason: HOSPADM

## 2022-03-10 RX ORDER — EPINEPHRINE 0.3 MG/.3ML
0.3 INJECTION SUBCUTANEOUS ONCE AS NEEDED
Status: CANCELLED | OUTPATIENT
Start: 2022-03-17

## 2022-03-10 RX ORDER — EPINEPHRINE 0.3 MG/.3ML
0.3 INJECTION SUBCUTANEOUS ONCE AS NEEDED
Status: DISCONTINUED | OUTPATIENT
Start: 2022-03-10 | End: 2022-03-10 | Stop reason: HOSPADM

## 2022-03-10 RX ORDER — BORTEZOMIB 3.5 MG/1
0.7 INJECTION, POWDER, LYOPHILIZED, FOR SOLUTION INTRAVENOUS; SUBCUTANEOUS
Status: COMPLETED | OUTPATIENT
Start: 2022-03-10 | End: 2022-03-10

## 2022-03-10 RX ORDER — EPINEPHRINE 0.3 MG/.3ML
0.3 INJECTION SUBCUTANEOUS ONCE AS NEEDED
Status: CANCELLED | OUTPATIENT
Start: 2022-03-10

## 2022-03-10 RX ADMIN — BORTEZOMIB 1.2 MG: 3.5 INJECTION, POWDER, LYOPHILIZED, FOR SOLUTION INTRAVENOUS; SUBCUTANEOUS at 10:03

## 2022-03-16 ENCOUNTER — PATIENT MESSAGE (OUTPATIENT)
Dept: ADMINISTRATIVE | Facility: HOSPITAL | Age: 72
End: 2022-03-16
Payer: MEDICARE

## 2022-03-17 ENCOUNTER — INFUSION (OUTPATIENT)
Dept: INFUSION THERAPY | Facility: HOSPITAL | Age: 72
End: 2022-03-17
Payer: MEDICARE

## 2022-03-17 ENCOUNTER — LAB VISIT (OUTPATIENT)
Dept: LAB | Facility: HOSPITAL | Age: 72
End: 2022-03-17
Payer: MEDICARE

## 2022-03-17 ENCOUNTER — OFFICE VISIT (OUTPATIENT)
Dept: HEMATOLOGY/ONCOLOGY | Facility: CLINIC | Age: 72
End: 2022-03-17
Payer: MEDICARE

## 2022-03-17 VITALS
SYSTOLIC BLOOD PRESSURE: 141 MMHG | BODY MASS INDEX: 26.39 KG/M2 | HEART RATE: 61 BPM | DIASTOLIC BLOOD PRESSURE: 73 MMHG | HEIGHT: 63 IN | RESPIRATION RATE: 20 BRPM | OXYGEN SATURATION: 97 % | WEIGHT: 148.94 LBS | TEMPERATURE: 98 F

## 2022-03-17 VITALS — SYSTOLIC BLOOD PRESSURE: 139 MMHG | RESPIRATION RATE: 18 BRPM | HEART RATE: 70 BPM | DIASTOLIC BLOOD PRESSURE: 72 MMHG

## 2022-03-17 DIAGNOSIS — R06.02 SHORTNESS OF BREATH: ICD-10-CM

## 2022-03-17 DIAGNOSIS — C80.1 NEUROPATHY ASSOCIATED WITH CANCER: ICD-10-CM

## 2022-03-17 DIAGNOSIS — T50.905A DRUG-INDUCED CYTOPENIA: ICD-10-CM

## 2022-03-17 DIAGNOSIS — Z94.84 HISTORY OF AUTOLOGOUS STEM CELL TRANSPLANT: Primary | ICD-10-CM

## 2022-03-17 DIAGNOSIS — E11.9 TYPE 2 DIABETES MELLITUS WITHOUT COMPLICATION, WITHOUT LONG-TERM CURRENT USE OF INSULIN: ICD-10-CM

## 2022-03-17 DIAGNOSIS — E78.00 PURE HYPERCHOLESTEROLEMIA: ICD-10-CM

## 2022-03-17 DIAGNOSIS — C90.00 MULTIPLE MYELOMA, REMISSION STATUS UNSPECIFIED: ICD-10-CM

## 2022-03-17 DIAGNOSIS — I69.30 HISTORY OF CVA WITH RESIDUAL DEFICIT: ICD-10-CM

## 2022-03-17 DIAGNOSIS — G25.2 COARSE TREMORS: ICD-10-CM

## 2022-03-17 DIAGNOSIS — G63 NEUROPATHY ASSOCIATED WITH CANCER: ICD-10-CM

## 2022-03-17 DIAGNOSIS — D75.9 DRUG-INDUCED CYTOPENIA: ICD-10-CM

## 2022-03-17 DIAGNOSIS — I10 ESSENTIAL HYPERTENSION: ICD-10-CM

## 2022-03-17 DIAGNOSIS — C90.01 MULTIPLE MYELOMA IN REMISSION: Primary | ICD-10-CM

## 2022-03-17 DIAGNOSIS — C90.02 MULTIPLE MYELOMA IN RELAPSE: ICD-10-CM

## 2022-03-17 DIAGNOSIS — Z85.3 HISTORY OF LEFT BREAST CANCER: ICD-10-CM

## 2022-03-17 LAB
ALBUMIN SERPL BCP-MCNC: 4.2 G/DL (ref 3.5–5.2)
ALP SERPL-CCNC: 51 U/L (ref 55–135)
ALT SERPL W/O P-5'-P-CCNC: 12 U/L (ref 10–44)
ANION GAP SERPL CALC-SCNC: 10 MMOL/L (ref 8–16)
ANISOCYTOSIS BLD QL SMEAR: SLIGHT
AST SERPL-CCNC: 16 U/L (ref 10–40)
BASOPHILS # BLD AUTO: 0.02 K/UL (ref 0–0.2)
BASOPHILS NFR BLD: 0.5 % (ref 0–1.9)
BILIRUB SERPL-MCNC: 0.4 MG/DL (ref 0.1–1)
BUN SERPL-MCNC: 13 MG/DL (ref 8–23)
CALCIUM SERPL-MCNC: 9.4 MG/DL (ref 8.7–10.5)
CHLORIDE SERPL-SCNC: 106 MMOL/L (ref 95–110)
CO2 SERPL-SCNC: 28 MMOL/L (ref 23–29)
CREAT SERPL-MCNC: 0.8 MG/DL (ref 0.5–1.4)
DIFFERENTIAL METHOD: ABNORMAL
EOSINOPHIL # BLD AUTO: 0.1 K/UL (ref 0–0.5)
EOSINOPHIL NFR BLD: 1.2 % (ref 0–8)
ERYTHROCYTE [DISTWIDTH] IN BLOOD BY AUTOMATED COUNT: 16.8 % (ref 11.5–14.5)
EST. GFR  (AFRICAN AMERICAN): >60 ML/MIN/1.73 M^2
EST. GFR  (NON AFRICAN AMERICAN): >60 ML/MIN/1.73 M^2
ESTIMATED AVG GLUCOSE: 117 MG/DL (ref 68–131)
GLUCOSE SERPL-MCNC: 89 MG/DL (ref 70–110)
HBA1C MFR BLD: 5.7 % (ref 4–5.6)
HCT VFR BLD AUTO: 36.5 % (ref 37–48.5)
HGB BLD-MCNC: 10.9 G/DL (ref 12–16)
HYPOCHROMIA BLD QL SMEAR: ABNORMAL
IGA SERPL-MCNC: 51 MG/DL (ref 40–350)
IGG SERPL-MCNC: 1022 MG/DL (ref 650–1600)
IGM SERPL-MCNC: 57 MG/DL (ref 50–300)
IMM GRANULOCYTES # BLD AUTO: 0.01 K/UL (ref 0–0.04)
IMM GRANULOCYTES NFR BLD AUTO: 0.2 % (ref 0–0.5)
LYMPHOCYTES # BLD AUTO: 1.2 K/UL (ref 1–4.8)
LYMPHOCYTES NFR BLD: 28.2 % (ref 18–48)
MCH RBC QN AUTO: 24.7 PG (ref 27–31)
MCHC RBC AUTO-ENTMCNC: 29.9 G/DL (ref 32–36)
MCV RBC AUTO: 83 FL (ref 82–98)
MONOCYTES # BLD AUTO: 0.3 K/UL (ref 0.3–1)
MONOCYTES NFR BLD: 7.8 % (ref 4–15)
NEUTROPHILS # BLD AUTO: 2.6 K/UL (ref 1.8–7.7)
NEUTROPHILS NFR BLD: 62.1 % (ref 38–73)
NRBC BLD-RTO: 0 /100 WBC
OVALOCYTES BLD QL SMEAR: ABNORMAL
PLATELET # BLD AUTO: 150 K/UL (ref 150–450)
PMV BLD AUTO: ABNORMAL FL (ref 9.2–12.9)
POIKILOCYTOSIS BLD QL SMEAR: SLIGHT
POLYCHROMASIA BLD QL SMEAR: ABNORMAL
POTASSIUM SERPL-SCNC: 4 MMOL/L (ref 3.5–5.1)
PROT SERPL-MCNC: 7.7 G/DL (ref 6–8.4)
RBC # BLD AUTO: 4.42 M/UL (ref 4–5.4)
SCHISTOCYTES BLD QL SMEAR: ABNORMAL
SODIUM SERPL-SCNC: 144 MMOL/L (ref 136–145)
WBC # BLD AUTO: 4.22 K/UL (ref 3.9–12.7)

## 2022-03-17 PROCEDURE — 83520 IMMUNOASSAY QUANT NOS NONAB: CPT | Mod: 59 | Performed by: INTERNAL MEDICINE

## 2022-03-17 PROCEDURE — 84165 PATHOLOGIST INTERPRETATION SPE: ICD-10-PCS | Mod: 26,,, | Performed by: PATHOLOGY

## 2022-03-17 PROCEDURE — 1159F PR MEDICATION LIST DOCUMENTED IN MEDICAL RECORD: ICD-10-PCS | Mod: CPTII,S$GLB,, | Performed by: NURSE PRACTITIONER

## 2022-03-17 PROCEDURE — 99214 PR OFFICE/OUTPT VISIT, EST, LEVL IV, 30-39 MIN: ICD-10-PCS | Mod: S$GLB,,, | Performed by: NURSE PRACTITIONER

## 2022-03-17 PROCEDURE — 84165 PROTEIN E-PHORESIS SERUM: CPT | Performed by: INTERNAL MEDICINE

## 2022-03-17 PROCEDURE — 82784 ASSAY IGA/IGD/IGG/IGM EACH: CPT | Performed by: INTERNAL MEDICINE

## 2022-03-17 PROCEDURE — 80053 COMPREHEN METABOLIC PANEL: CPT | Performed by: INTERNAL MEDICINE

## 2022-03-17 PROCEDURE — 3077F PR MOST RECENT SYSTOLIC BLOOD PRESSURE >= 140 MM HG: ICD-10-PCS | Mod: CPTII,S$GLB,, | Performed by: NURSE PRACTITIONER

## 2022-03-17 PROCEDURE — 99999 PR PBB SHADOW E&M-EST. PATIENT-LVL V: ICD-10-PCS | Mod: PBBFAC,,, | Performed by: NURSE PRACTITIONER

## 2022-03-17 PROCEDURE — 3008F PR BODY MASS INDEX (BMI) DOCUMENTED: ICD-10-PCS | Mod: CPTII,S$GLB,, | Performed by: NURSE PRACTITIONER

## 2022-03-17 PROCEDURE — 4010F ACE/ARB THERAPY RXD/TAKEN: CPT | Mod: CPTII,S$GLB,, | Performed by: NURSE PRACTITIONER

## 2022-03-17 PROCEDURE — 3044F PR MOST RECENT HEMOGLOBIN A1C LEVEL <7.0%: ICD-10-PCS | Mod: CPTII,S$GLB,, | Performed by: NURSE PRACTITIONER

## 2022-03-17 PROCEDURE — 1101F PT FALLS ASSESS-DOCD LE1/YR: CPT | Mod: CPTII,S$GLB,, | Performed by: NURSE PRACTITIONER

## 2022-03-17 PROCEDURE — 1126F PR PAIN SEVERITY QUANTIFIED, NO PAIN PRESENT: ICD-10-PCS | Mod: CPTII,S$GLB,, | Performed by: NURSE PRACTITIONER

## 2022-03-17 PROCEDURE — 3078F DIAST BP <80 MM HG: CPT | Mod: CPTII,S$GLB,, | Performed by: NURSE PRACTITIONER

## 2022-03-17 PROCEDURE — 25000003 PHARM REV CODE 250: Performed by: INTERNAL MEDICINE

## 2022-03-17 PROCEDURE — 3044F HG A1C LEVEL LT 7.0%: CPT | Mod: CPTII,S$GLB,, | Performed by: NURSE PRACTITIONER

## 2022-03-17 PROCEDURE — 86334 PATHOLOGIST INTERPRETATION IFE: ICD-10-PCS | Mod: 26,,, | Performed by: PATHOLOGY

## 2022-03-17 PROCEDURE — 1160F PR REVIEW ALL MEDS BY PRESCRIBER/CLIN PHARMACIST DOCUMENTED: ICD-10-PCS | Mod: CPTII,S$GLB,, | Performed by: NURSE PRACTITIONER

## 2022-03-17 PROCEDURE — 86334 IMMUNOFIX E-PHORESIS SERUM: CPT | Mod: 26,,, | Performed by: PATHOLOGY

## 2022-03-17 PROCEDURE — 99214 OFFICE O/P EST MOD 30 MIN: CPT | Mod: S$GLB,,, | Performed by: NURSE PRACTITIONER

## 2022-03-17 PROCEDURE — 63600175 PHARM REV CODE 636 W HCPCS: Mod: JG | Performed by: INTERNAL MEDICINE

## 2022-03-17 PROCEDURE — 83036 HEMOGLOBIN GLYCOSYLATED A1C: CPT | Performed by: INTERNAL MEDICINE

## 2022-03-17 PROCEDURE — 3008F BODY MASS INDEX DOCD: CPT | Mod: CPTII,S$GLB,, | Performed by: NURSE PRACTITIONER

## 2022-03-17 PROCEDURE — 3077F SYST BP >= 140 MM HG: CPT | Mod: CPTII,S$GLB,, | Performed by: NURSE PRACTITIONER

## 2022-03-17 PROCEDURE — 86334 IMMUNOFIX E-PHORESIS SERUM: CPT | Performed by: INTERNAL MEDICINE

## 2022-03-17 PROCEDURE — 36415 COLL VENOUS BLD VENIPUNCTURE: CPT | Performed by: INTERNAL MEDICINE

## 2022-03-17 PROCEDURE — 84165 PROTEIN E-PHORESIS SERUM: CPT | Mod: 26,,, | Performed by: PATHOLOGY

## 2022-03-17 PROCEDURE — 96401 CHEMO ANTI-NEOPL SQ/IM: CPT

## 2022-03-17 PROCEDURE — 3288F PR FALLS RISK ASSESSMENT DOCUMENTED: ICD-10-PCS | Mod: CPTII,S$GLB,, | Performed by: NURSE PRACTITIONER

## 2022-03-17 PROCEDURE — 1101F PR PT FALLS ASSESS DOC 0-1 FALLS W/OUT INJ PAST YR: ICD-10-PCS | Mod: CPTII,S$GLB,, | Performed by: NURSE PRACTITIONER

## 2022-03-17 PROCEDURE — 1160F RVW MEDS BY RX/DR IN RCRD: CPT | Mod: CPTII,S$GLB,, | Performed by: NURSE PRACTITIONER

## 2022-03-17 PROCEDURE — 4010F PR ACE/ARB THEARPY RXD/TAKEN: ICD-10-PCS | Mod: CPTII,S$GLB,, | Performed by: NURSE PRACTITIONER

## 2022-03-17 PROCEDURE — 3288F FALL RISK ASSESSMENT DOCD: CPT | Mod: CPTII,S$GLB,, | Performed by: NURSE PRACTITIONER

## 2022-03-17 PROCEDURE — 99999 PR PBB SHADOW E&M-EST. PATIENT-LVL V: CPT | Mod: PBBFAC,,, | Performed by: NURSE PRACTITIONER

## 2022-03-17 PROCEDURE — 1126F AMNT PAIN NOTED NONE PRSNT: CPT | Mod: CPTII,S$GLB,, | Performed by: NURSE PRACTITIONER

## 2022-03-17 PROCEDURE — 85025 COMPLETE CBC W/AUTO DIFF WBC: CPT | Performed by: INTERNAL MEDICINE

## 2022-03-17 PROCEDURE — 1159F MED LIST DOCD IN RCRD: CPT | Mod: CPTII,S$GLB,, | Performed by: NURSE PRACTITIONER

## 2022-03-17 PROCEDURE — 3078F PR MOST RECENT DIASTOLIC BLOOD PRESSURE < 80 MM HG: ICD-10-PCS | Mod: CPTII,S$GLB,, | Performed by: NURSE PRACTITIONER

## 2022-03-17 RX ORDER — ACETAMINOPHEN 325 MG/1
650 TABLET ORAL
Status: COMPLETED | OUTPATIENT
Start: 2022-03-17 | End: 2022-03-17

## 2022-03-17 RX ORDER — EPINEPHRINE 0.3 MG/.3ML
0.3 INJECTION SUBCUTANEOUS ONCE AS NEEDED
Status: CANCELLED | OUTPATIENT
Start: 2022-03-24

## 2022-03-17 RX ORDER — BORTEZOMIB 3.5 MG/1
0.7 INJECTION, POWDER, LYOPHILIZED, FOR SOLUTION INTRAVENOUS; SUBCUTANEOUS
Status: CANCELLED
Start: 2022-03-17

## 2022-03-17 RX ORDER — DIPHENHYDRAMINE HYDROCHLORIDE 50 MG/ML
50 INJECTION INTRAMUSCULAR; INTRAVENOUS ONCE AS NEEDED
Status: CANCELLED | OUTPATIENT
Start: 2022-03-31

## 2022-03-17 RX ORDER — BORTEZOMIB 3.5 MG/1
1 INJECTION, POWDER, LYOPHILIZED, FOR SOLUTION INTRAVENOUS; SUBCUTANEOUS
Status: CANCELLED
Start: 2022-03-17

## 2022-03-17 RX ORDER — BORTEZOMIB 3.5 MG/1
0.7 INJECTION, POWDER, LYOPHILIZED, FOR SOLUTION INTRAVENOUS; SUBCUTANEOUS
Status: CANCELLED | OUTPATIENT
Start: 2022-04-07

## 2022-03-17 RX ORDER — BORTEZOMIB 3.5 MG/1
0.7 INJECTION, POWDER, LYOPHILIZED, FOR SOLUTION INTRAVENOUS; SUBCUTANEOUS
Status: CANCELLED | OUTPATIENT
Start: 2022-03-24

## 2022-03-17 RX ORDER — BORTEZOMIB 3.5 MG/1
0.7 INJECTION, POWDER, LYOPHILIZED, FOR SOLUTION INTRAVENOUS; SUBCUTANEOUS
Status: CANCELLED | OUTPATIENT
Start: 2022-03-31

## 2022-03-17 RX ORDER — DIPHENHYDRAMINE HYDROCHLORIDE 50 MG/ML
50 INJECTION INTRAMUSCULAR; INTRAVENOUS ONCE AS NEEDED
Status: DISCONTINUED | OUTPATIENT
Start: 2022-03-17 | End: 2022-03-17 | Stop reason: HOSPADM

## 2022-03-17 RX ORDER — DIPHENHYDRAMINE HCL 25 MG
25 CAPSULE ORAL
Status: COMPLETED | OUTPATIENT
Start: 2022-03-17 | End: 2022-03-17

## 2022-03-17 RX ORDER — DIPHENHYDRAMINE HYDROCHLORIDE 50 MG/ML
50 INJECTION INTRAMUSCULAR; INTRAVENOUS ONCE AS NEEDED
Status: CANCELLED | OUTPATIENT
Start: 2022-04-07

## 2022-03-17 RX ORDER — EPINEPHRINE 0.3 MG/.3ML
0.3 INJECTION SUBCUTANEOUS ONCE AS NEEDED
Status: CANCELLED | OUTPATIENT
Start: 2022-04-07

## 2022-03-17 RX ORDER — EPINEPHRINE 0.3 MG/.3ML
0.3 INJECTION SUBCUTANEOUS ONCE AS NEEDED
Status: DISCONTINUED | OUTPATIENT
Start: 2022-03-17 | End: 2022-03-17 | Stop reason: HOSPADM

## 2022-03-17 RX ORDER — ACETAMINOPHEN 500 MG
TABLET ORAL
COMMUNITY
Start: 2022-02-19

## 2022-03-17 RX ORDER — EPINEPHRINE 0.3 MG/.3ML
0.3 INJECTION SUBCUTANEOUS ONCE AS NEEDED
Status: CANCELLED | OUTPATIENT
Start: 2022-03-31

## 2022-03-17 RX ORDER — DIPHENHYDRAMINE HYDROCHLORIDE 50 MG/ML
50 INJECTION INTRAMUSCULAR; INTRAVENOUS ONCE AS NEEDED
Status: CANCELLED | OUTPATIENT
Start: 2022-03-24

## 2022-03-17 RX ORDER — BORTEZOMIB 3.5 MG/1
0.7 INJECTION, POWDER, LYOPHILIZED, FOR SOLUTION INTRAVENOUS; SUBCUTANEOUS
Status: COMPLETED | OUTPATIENT
Start: 2022-03-17 | End: 2022-03-17

## 2022-03-17 RX ADMIN — ACETAMINOPHEN 650 MG: 325 TABLET ORAL at 10:03

## 2022-03-17 RX ADMIN — DIPHENHYDRAMINE HYDROCHLORIDE 25 MG: 25 CAPSULE ORAL at 10:03

## 2022-03-17 RX ADMIN — DARATUMUMAB AND HYALURONIDASE-FIHJ (HUMAN RECOMBINANT) 1800 MG: 1800; 30000 INJECTION SUBCUTANEOUS at 10:03

## 2022-03-17 RX ADMIN — BORTEZOMIB 1.2 MG: 3.5 INJECTION, POWDER, LYOPHILIZED, FOR SOLUTION INTRAVENOUS; SUBCUTANEOUS at 10:03

## 2022-03-17 NOTE — PROGRESS NOTES
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  She achieved and tolerated well VRd x completing 7, 28 day cycles and achieving deep VGPR to induction. Then underwent Melphalan autologous stem cell transplant on 2/12/2020.      Extensive PMH as below.    2 prior CVAs (one in 2008, one in 2011)  L breast cancer DCIS stage 0 (s/p lumpectomy and radiation, no endocrine tx due to CVA)  HTN  DM II  Neuropathy, b/l hands  Prior smoker, quit in 2011  Hepatic lesions - vascular per recent MRI in 09 /2019 with no changes; will confirm no further rascon needed from help  Statin Intolerance - on praluent, PCSK9 inhibitor  HFpEF - EF 55%, diastolic dysfunction  Anxiety/Depression     ADMISSION SUMMARY: 2/10-2/26/2020  Admitted 2/10, tolerated chemo and transplant well. Engrafted on day +12. Remained afebrile throughout hospital stay and no mucositis. Experienced expected side effects of nausea and diarrhea controlled with antiemetics and Imodium. Discharged home with home PT/OT       Subjective:       Initial History: Ms. Thompson is a 71 y.o. female who returns for follow up of multiple myeloma in remission. She is 2 year 1 month post AutoSCT.   On kenia/zhanna/dex after biochemical relapse noted approximately 1 year post transplant. Tolerating and responding well. post-stroke cognitive dysfunction, but with progressive decline over the past several years. Her  now provides support/oversight in all complex activities of daily living. Her friend assist her for transportation and appointments. Following by neurologist for cognitive/memory  decline.   Fingertip neuropathy stable.Accompanied by her friend.    Today is C9D1 kenia/zhanna    Otherwise no signs symptoms of kenia/zhanna intolerance or myeloma progression.     Past Medical History:   Past Medical History:   Diagnosis Date    Acute respiratory failure with hypoxia 4/30/2019    FUENTES (acute kidney injury) 5/1/2019    Allergy     Anemia     Aortic aneurysm     Breast cancer 10/2011    left breast Stage 0 DCIS    Cataract     Chronic diastolic congestive heart failure 11/6/2015    Colon polyp     Community acquired pneumonia of right lower lobe of lung 8/3/2021    GERD (gastroesophageal reflux disease)     Hiatal hernia     History of colonic polyps     Hx of psychiatric care     Zoloft    HX: breast cancer     Hyperlipemia     Hypertension     ICH (intracerebral hemorrhage)     Major depressive disorder, single episode, mild 6/23/2016    Nuclear sclerosis 7/21/2014    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 7/21/2014    PAD (peripheral artery disease) 11/6/2015    Psychiatric problem     Statin-induced rhabdomyolysis     4/2015     Stroke 4/2011    Type 2 diabetes mellitus with diabetic peripheral angiopathy without gangrene, with long-term current use of insulin 3/31/2021    Type 2 diabetes mellitus without complication, without long-term current use of insulin 2/10/2020    Walker as ambulation aid        Past Surgical HIstory:   Past Surgical History:   Procedure Laterality Date    APPENDECTOMY      BONE MARROW BIOPSY Left 10/3/2019    Procedure: Biopsy-bone marrow;  Surgeon: Jere Tineo MD;  Location: [address];  Service: Oncology;  Laterality: Left;    BREAST BIOPSY Left 10/2011    BREAST LUMPECTOMY Left 2011    DCIS    COLONOSCOPY      COLONOSCOPY N/A 1/9/2020    Procedure: COLONOSCOPY;  Surgeon: Quang De La Cruz MD;  Location: [facility] (4TH FLR);  Service: Endoscopy;  Laterality: N/A;    CYST REMOVAL      back     ESOPHAGOGASTRODUODENOSCOPY  07/2016    duodenal angioectasia    ESOPHAGOGASTRODUODENOSCOPY N/A 1/9/2020    Procedure: EGD (ESOPHAGOGASTRODUODENOSCOPY);  Surgeon: Quang De La Cruz MD;  Location: [facility] ([address] [place] [address]);  Service: Endoscopy;  Laterality: N/A;    FOOT FRACTURE SURGERY  10/2012    right foot, with R hallux valgus repair    FRACTURE SURGERY Right     broken toe repiar    HEMORRHOID SURGERY      LIVER BIOPSY  04/2015    essentially normal, no fibrosis    TUBAL LIGATION      UPPER GASTROINTESTINAL ENDOSCOPY         Family History:   Family History   Problem Relation Age of Onset    Dementia Sister         x1 sister    Cancer Sister 63        Lung Cancer    No Known Problems Mother     Cancer Father     No Known Problems Brother     Hypertension Daughter     Fibroids Daughter         uterine    Hypertension Son     No Known Problems Brother     No Known Problems Maternal Aunt     No Known Problems Maternal Uncle     No Known Problems Paternal Aunt     No Known Problems Paternal Uncle     Hypertension Maternal Grandmother     No Known Problems Maternal Grandfather     No Known Problems Paternal Grandmother     No Known Problems Paternal Grandfather     Ovarian cancer Neg Hx     Colon cancer Neg Hx     Tremor Neg Hx     Amblyopia Neg Hx     Blindness Neg Hx     Cataracts Neg Hx     Diabetes Neg Hx     Glaucoma Neg Hx     Macular degeneration Neg Hx     Retinal detachment Neg Hx     Strabismus Neg Hx     Stroke Neg Hx     Thyroid disease Neg Hx     Esophageal cancer Neg Hx     Rectal cancer Neg Hx     Stomach cancer Neg Hx     Ulcerative colitis Neg Hx     Crohn's disease Neg Hx     Irritable bowel syndrome Neg Hx     Celiac disease Neg Hx        Social History:  reports that she quit smoking about 10 years ago. Her smoking use included cigarettes. She started smoking about 55 years ago. She has a 11.00 pack-year smoking history. She has never used smokeless tobacco. She  reports previous alcohol use. She reports that she does not use drugs.    Allergies:  Review of patient's allergies indicates:   Allergen Reactions    Lipitor [atorvastatin] Itching     Itching, elevated cpk, muscle aches, statin induced myositis       Medications:  Current Outpatient Medications   Medication Sig Dispense Refill    acyclovir (ZOVIRAX) 400 MG tablet Take by mouth 2 (two) times daily.      albuterol (PROVENTIL HFA) 90 mcg/actuation inhaler Inhale 2 puffs into the lungs every 6 (six) hours as needed for Wheezing. Rescue 6.7 g 0    alirocumab (PRALUENT PEN) 75 mg/mL PnIj Inject 1 mL (75 mg total) into the skin every 14 (fourteen) days. 6 mL 3    amLODIPine (NORVASC) 5 MG tablet TAKE 1 TABLET BY MOUTH EVERY DAY 90 tablet 3    aspirin (ECOTRIN) 81 MG EC tablet Take 1 tablet (81 mg total) by mouth once daily. 90 tablet 3    cholecalciferol, vitamin D3, 125 mcg (5,000 unit) Tab 1 tablet DAILY (route: oral)      ENGERIX-B, PF, 20 mcg/mL Syrg       gabapentin (NEURONTIN) 300 MG capsule Take 2 capsules (600 mg total) by mouth 2 (two) times daily. 180 capsule 2    metFORMIN (GLUCOPHAGE) 1000 MG tablet Take 1 tablet (1,000 mg total) by mouth 2 (two) times daily with meals. 180 tablet 3    omeprazole (PRILOSEC) 40 MG capsule TAKE 1 CAPSULE BY MOUTH EVERY DAY 90 capsule 3    potassium chloride SA (K-DUR,KLOR-CON) 20 MEQ tablet TAKE 1 TABLET BY MOUTH ONCE DAILY 90 tablet 3    traMADoL (ULTRAM) 50 mg tablet Take 1 tablet (50 mg total) by mouth 2 (two) times daily as needed for Pain. 60 tablet 3    valsartan (DIOVAN) 80 MG tablet Take 1 tablet (80 mg total) by mouth once daily. 90 tablet 3    VELCADE 3.5 mg injection       zoledronic 4 mg/100 mL PgBk infusion       albuterol (ACCUNEB) 1.25 mg/3 mL Nebu Take 3 mLs (1.25 mg total) by nebulization every 6 (six) hours as needed (wheeze/cough). Rescue (Patient not taking: Reported on 3/17/2022) 75 mL 2    benzonatate (TESSALON) 100 MG capsule Take 1  capsule (100 mg total) by mouth 3 (three) times daily as needed for Cough. (Patient not taking: Reported on 3/17/2022) 30 capsule 0    guaiFENesin (MUCINEX) 600 mg 12 hr tablet Take 1 tablet (600 mg total) by mouth 2 (two) times daily. (Patient not taking: Reported on 3/17/2022) 30 tablet 0    ondansetron (ZOFRAN-ODT) 4 MG TbDL Take 1 tablet (4 mg total) by mouth every 6 (six) hours as needed (nausea). (Patient not taking: Reported on 3/17/2022) 12 tablet 0     No current facility-administered medications for this visit.     Facility-Administered Medications Ordered in Other Visits   Medication Dose Route Frequency Provider Last Rate Last Admin    diphenhydrAMINE injection 50 mg  50 mg Intravenous Once PRN Jere Tineo MD        EPINEPHrine (EPIPEN) 0.3 mg/0.3 mL pen injection 0.3 mg  0.3 mg Intramuscular Once PRN Jere Tineo MD        hydrocortisone sodium succinate injection 100 mg  100 mg Intravenous Once PRN Jere Tineo MD           Review of Systems   Constitutional: Positive for fatigue. Negative for appetite change, chills and fever.   HENT: Negative for ear discharge, ear pain, nosebleeds, postnasal drip, rhinorrhea, sinus pressure and sore throat.    Eyes: Negative for pain.   Respiratory: Negative for chest tightness and shortness of breath.    Cardiovascular: Negative for chest pain, palpitations and leg swelling.   Gastrointestinal: Negative for abdominal distention, abdominal pain, blood in stool, constipation, diarrhea, nausea and vomiting.   Genitourinary: Negative.  Negative for dysuria and hematuria.   Musculoskeletal: Negative.  Negative for back pain, gait problem and myalgias.   Skin: Negative.    Neurological: Positive for tremors and numbness. Negative for syncope and headaches.   Hematological: Negative for adenopathy. Does not bruise/bleed easily.   Psychiatric/Behavioral: The patient is not nervous/anxious.         Memory issues       ECOG Performance Status: 2  (due to remote  CVA)   Objective:      Vitals:   Vitals:    03/17/22 0815   BP: (!) 141/73   Pulse: 61   Resp: 20   Temp: 98.3 °F (36.8 °C)        PE:   General -in wheelchair  no apparent distress  HEENT - oropharynx clear  Chest and Lung -bilateral end expiratory wheezes  Cardiovascular - RRR with no MGR, normal S1 and S2  Abdomen-  soft, nontender, no palpable hepatomegaly or splenomegaly  Lymph - no palpable lymphadenopathy  Heme - no bruising, petechiae, pallor  Skin - no rashes or lesions  Psych - appropriate mood and affect  Neuro -  No change in chronic residual CVA symptoms    Laboratory Data:  Lab Visit on 03/17/2022   Component Date Value Ref Range Status    WBC 03/17/2022 4.22  3.90 - 12.70 K/uL Final    RBC 03/17/2022 4.42  4.00 - 5.40 M/uL Final    Hemoglobin 03/17/2022 10.9 (A) 12.0 - 16.0 g/dL Final    Hematocrit 03/17/2022 36.5 (A) 37.0 - 48.5 % Final    MCV 03/17/2022 83  82 - 98 fL Final    MCH 03/17/2022 24.7 (A) 27.0 - 31.0 pg Final    MCHC 03/17/2022 29.9 (A) 32.0 - 36.0 g/dL Final    RDW 03/17/2022 16.8 (A) 11.5 - 14.5 % Final    Platelets 03/17/2022 150  150 - 450 K/uL Final    MPV 03/17/2022 SEE COMMENT  9.2 - 12.9 fL Final    Result not available.    Immature Granulocytes 03/17/2022 0.2  0.0 - 0.5 % Final    Gran # (ANC) 03/17/2022 2.6  1.8 - 7.7 K/uL Final    Immature Grans (Abs) 03/17/2022 0.01  0.00 - 0.04 K/uL Final    Comment: Mild elevation in immature granulocytes is non specific and   can be seen in a variety of conditions including stress response,   acute inflammation, trauma and pregnancy. Correlation with other   laboratory and clinical findings is essential.      Lymph # 03/17/2022 1.2  1.0 - 4.8 K/uL Final    Mono # 03/17/2022 0.3  0.3 - 1.0 K/uL Final    Eos # 03/17/2022 0.1  0.0 - 0.5 K/uL Final    Baso # 03/17/2022 0.02  0.00 - 0.20 K/uL Final    nRBC 03/17/2022 0  0 /100 WBC Final    Gran % 03/17/2022 62.1  38.0 - 73.0 % Final    Lymph % 03/17/2022 28.2   18.0 - 48.0 % Final    Mono % 03/17/2022 7.8  4.0 - 15.0 % Final    Eosinophil % 03/17/2022 1.2  0.0 - 8.0 % Final    Basophil % 03/17/2022 0.5  0.0 - 1.9 % Final    Aniso 03/17/2022 Slight   Final    Poik 03/17/2022 Slight   Final    Poly 03/17/2022 Occasional   Final    Hypo 03/17/2022 Occasional   Final    Ovalocytes 03/17/2022 Occasional   Final    Fragmented Cells 03/17/2022 Occasional   Final    Differential Method 03/17/2022 Automated   Final    Sodium 03/17/2022 144  136 - 145 mmol/L Final    Potassium 03/17/2022 4.0  3.5 - 5.1 mmol/L Final    Chloride 03/17/2022 106  95 - 110 mmol/L Final    CO2 03/17/2022 28  23 - 29 mmol/L Final    Glucose 03/17/2022 89  70 - 110 mg/dL Final    BUN 03/17/2022 13  8 - 23 mg/dL Final    Creatinine 03/17/2022 0.8  0.5 - 1.4 mg/dL Final    Calcium 03/17/2022 9.4  8.7 - 10.5 mg/dL Final    Total Protein 03/17/2022 7.7  6.0 - 8.4 g/dL Final    Albumin 03/17/2022 4.2  3.5 - 5.2 g/dL Final    Total Bilirubin 03/17/2022 0.4  0.1 - 1.0 mg/dL Final    Comment: For infants and newborns, interpretation of results should be based  on gestational age, weight and in agreement with clinical  observations.    Premature Infant recommended reference ranges:  Up to 24 hours.............<8.0 mg/dL  Up to 48 hours............<12.0 mg/dL  3-5 days..................<15.0 mg/dL  6-29 days.................<15.0 mg/dL      Alkaline Phosphatase 03/17/2022 51 (A) 55 - 135 U/L Final    AST 03/17/2022 16  10 - 40 U/L Final    ALT 03/17/2022 12  10 - 44 U/L Final    Anion Gap 03/17/2022 10  8 - 16 mmol/L Final    eGFR if African American 03/17/2022 >60.0  >60 mL/min/1.73 m^2 Final    eGFR if non African American 03/17/2022 >60.0  >60 mL/min/1.73 m^2 Final    Comment: Calculation used to obtain the estimated glomerular filtration  rate (eGFR) is the CKD-EPI equation.       IgG 03/17/2022 1022  650 - 1600 mg/dL Final    IgG Cord Blood Reference Range: 650-1600 mg/dL.    IgA  03/17/2022 51  40 - 350 mg/dL Final    IgA Cord Blood Reference Range: <5 mg/dL.    IgM 03/17/2022 57  50 - 300 mg/dL Final    IgM Cord Blood Reference Range: <25 mg/dL.    Protein, Serum 03/17/2022 7.7  6.0 - 8.4 g/dL Final    Comment: Serum protein electrophoresis and immunofixation results should be   interpreted in clinical context in that some therapeutic agents can   result   in false positive results (example, daratumumab). Correlation with   the   patient s therapeutic regimen is required.      Hemoglobin A1C 03/17/2022 5.7 (A) 4.0 - 5.6 % Final    Comment: ADA Screening Guidelines:  5.7-6.4%  Consistent with prediabetes  >or=6.5%  Consistent with diabetes    High levels of fetal hemoglobin interfere with the HbA1C  assay. Heterozygous hemoglobin variants (HbS, HgC, etc)do  not significantly interfere with this assay.   However, presence of multiple variants may affect accuracy.      Estimated Avg Glucose 03/17/2022 117  68 - 131 mg/dL Final      Lab Results   Component Value Date    WBC 4.22 03/17/2022    HGB 10.9 (L) 03/17/2022    HCT 36.5 (L) 03/17/2022    MCV 83 03/17/2022     03/17/2022       Gran # (ANC)   Date Value Ref Range Status   03/17/2022 2.6 1.8 - 7.7 K/uL Final     Gran %   Date Value Ref Range Status   03/17/2022 62.1 38.0 - 73.0 % Final     Kappa Free Light Chains   Date Value Ref Range Status   02/17/2022 5.01 (H) 0.33 - 1.94 mg/dL Final   01/13/2022 3.77 (H) 0.33 - 1.94 mg/dL Final   12/16/2021 4.66 (H) 0.33 - 1.94 mg/dL Final     Lambda Free Light Chains   Date Value Ref Range Status   02/17/2022 1.63 0.57 - 2.63 mg/dL Final   01/13/2022 2.45 0.57 - 2.63 mg/dL Final   12/16/2021 1.57 0.57 - 2.63 mg/dL Final     Kappa/Lambda FLC Ratio   Date Value Ref Range Status   02/17/2022 3.07 (H) 0.26 - 1.65 Final     Comment:     Undetected antigen excess is a rare event but cannot   be excluded. If these free light chain results do not   agree with other clinical or laboratory findings  or   if the sample is from a patient that has previously   demonstrated antigen excess, discuss with the testing   laboratory.   Results should always be interpreted in conjunction   with other laboratory tests and clinical evidence.     01/13/2022 1.54 0.26 - 1.65 Final     Comment:     Undetected antigen excess is a rare event but cannot   be excluded. If these free light chain results do not   agree with other clinical or laboratory findings or   if the sample is from a patient that has previously   demonstrated antigen excess, discuss with the testing   laboratory.   Results should always be interpreted in conjunction   with other laboratory tests and clinical evidence.     12/16/2021 2.97 (H) 0.26 - 1.65 Final     Comment:     Undetected antigen excess is a rare event but cannot   be excluded. If these free light chain results do not   agree with other clinical or laboratory findings or   if the sample is from a patient that has previously   demonstrated antigen excess, discuss with the testing   laboratory.   Results should always be interpreted in conjunction   with other laboratory tests and clinical evidence.       IgG   Date Value Ref Range Status   03/17/2022 1022 650 - 1600 mg/dL Final     Comment:     IgG Cord Blood Reference Range: 650-1600 mg/dL.     IgA   Date Value Ref Range Status   03/17/2022 51 40 - 350 mg/dL Final     Comment:     IgA Cord Blood Reference Range: <5 mg/dL.     IgM   Date Value Ref Range Status   03/17/2022 57 50 - 300 mg/dL Final     Comment:     IgM Cord Blood Reference Range: <25 mg/dL.   Pathologist Interpretation SPE  Pathologist Interpretation SPE  Collected: 01/13/22 1121   Result status: Final   Resulting lab: [facility]   Value: REVIEWED   Comment:   Electronically reviewed and signed by:   Destiny Lopes M.D.   Signed on 01/18/22 at 14:00   Normal total protein.   Normal gamma globulins are decreased.   Two closely adjacent paraprotein bands:  "  1) Near-gamma = 0.22 g/dL (previously 0.19 g/dL).   2) Mid-gamma = 0.17 g/dL (previously, 0.15 g/dL).    *Additional information available - comment           Imaging:      Assessment:       Ms. Thompson is a 71 year old female with relapsed multiple myeloma.     Plan:     MM s/p Auto SCT  - high risk MM with 17p deletion  - 2 year 1 month  s/p Auto SCT  - started maintenance q2w velcade 5/6/20. Receiving C9 today  - serial biochemical relapse  Noted and  given this and high risk CG    transitioned to weekly Sravanthi/Noble (1mg/m2) /dex  Salvage to which she is tolerating as of 02/17/22 biochemical studies. Today's level pending. Mild up trending, continue to close monitor.   - Due to uncontrolled hyperglycemia dex stopped after C4. Continue on sravanthi/noble until progression.  velcade decreased to 1mg/m2 for grade 1 neuropathy which is stable.   -supportive meds:   continue ppx acyclovir, asa; resume maintenance zometa q 3 months for 1 year; dose today; next due mid April 2022    -post transplant vaccines completed     anemia due to chemotherapy  - stable mild  -due to therapy monitor      Neuropathy  -Stable   - continues gabapentin and prn tramadol  -dose reduced velcade further to 0.7mg/m2 moving forward (12/16/21)     SOB/descending aortic aneurysm  - CTA and dopplers ordered urgently with high concern for DVT/PE; completed 8/3/20  - incidental descending thoracic aortic aneurysm noted; referred to thoracic surgery; seen 8/7/20; per note: "at current size would not recommend any intervention as risk of rupture remains low.  Recommend surveillance CT chest every 12 months to monitor growth of the aneurysm.  Would typically recommend aspirin 81 mg daily in patients with aortic aneurysm  - ASA started (9/14/20)     HTN  - continue norvasc  - Patient to monitor BP closely  Anxiety/depression  - continue zoloft    DM2  - diet controlled    Hx of CVA  - s/p 2008, 2011    HLD with Statin Intolerance  -on praluent, PCSK9 " inhibitor    L breast cancer DCIS stage 0  -s/p lumpectomy and radiation, no endocrine tx due to CVA    Coarse tremors/memory issues  -  Stable coarse tremors   - Short term memory issues worsened  - repeat MRI brain 12/16/21 Multifocal areas of remote parenchymal hemorrhage similar to prior exam, possibility of underlying cerebral amyloid angiopathy.  - Close f/u with neuro.       BMT Chart Routing      Follow up with physician . /NP   Follow up with CHRISTY    Labs CBC, CMP, immunoglobulins and SPEP   Lab interval:  -velcade injection on 03/24, 03/31,04/07,kenia injection on 04/14,-cbc, cmp, serum free light chains, quantitative immunoglobulins, serum electropheresis, serum immunofixation and np/ appt prior to treatment on 04/14   Imaging    Pharmacy appointment    Other referrals          Adina Wright NP  Hematology/Oncology/BMT

## 2022-03-17 NOTE — PLAN OF CARE
1100-Pt tolerated Sravanthi and Velcade SQ well, no complications or side effects, POC and meds discussed with pt, pt aware of upcoming appts, pt knows to call MD with any questions or concerns. Pt ambulated off unit, no distress noted.

## 2022-03-18 LAB
ALBUMIN SERPL ELPH-MCNC: 4.53 G/DL (ref 3.35–5.55)
ALPHA1 GLOB SERPL ELPH-MCNC: 0.35 G/DL (ref 0.17–0.41)
ALPHA2 GLOB SERPL ELPH-MCNC: 0.99 G/DL (ref 0.43–0.99)
B-GLOBULIN SERPL ELPH-MCNC: 0.85 G/DL (ref 0.5–1.1)
GAMMA GLOB SERPL ELPH-MCNC: 0.99 G/DL (ref 0.67–1.58)
INTERPRETATION SERPL IFE-IMP: NORMAL
KAPPA LC SER QL IA: 6.42 MG/DL (ref 0.33–1.94)
KAPPA LC/LAMBDA SER IA: 4.59 (ref 0.26–1.65)
LAMBDA LC SER QL IA: 1.4 MG/DL (ref 0.57–2.63)
PATHOLOGIST INTERPRETATION IFE: NORMAL
PATHOLOGIST INTERPRETATION SPE: NORMAL
PROT SERPL-MCNC: 7.7 G/DL (ref 6–8.4)

## 2022-03-21 ENCOUNTER — TELEPHONE (OUTPATIENT)
Dept: NEUROLOGY | Facility: CLINIC | Age: 72
End: 2022-03-21
Payer: MEDICARE

## 2022-03-21 ENCOUNTER — OFFICE VISIT (OUTPATIENT)
Dept: NEUROLOGY | Facility: CLINIC | Age: 72
End: 2022-03-21
Payer: MEDICARE

## 2022-03-21 DIAGNOSIS — C90.01 MULTIPLE MYELOMA IN REMISSION: ICD-10-CM

## 2022-03-21 DIAGNOSIS — I69.30 HISTORY OF CVA WITH RESIDUAL DEFICIT: ICD-10-CM

## 2022-03-21 DIAGNOSIS — G31.84 MILD COGNITIVE IMPAIRMENT: Primary | ICD-10-CM

## 2022-03-21 PROCEDURE — 4010F PR ACE/ARB THEARPY RXD/TAKEN: ICD-10-PCS | Mod: CPTII,95,, | Performed by: PSYCHIATRY & NEUROLOGY

## 2022-03-21 PROCEDURE — 99499 UNLISTED E&M SERVICE: CPT | Mod: 95,,, | Performed by: PSYCHIATRY & NEUROLOGY

## 2022-03-21 PROCEDURE — 4010F ACE/ARB THERAPY RXD/TAKEN: CPT | Mod: CPTII,95,, | Performed by: PSYCHIATRY & NEUROLOGY

## 2022-03-21 PROCEDURE — 99499 NO LOS: ICD-10-PCS | Mod: 95,,, | Performed by: PSYCHIATRY & NEUROLOGY

## 2022-03-21 PROCEDURE — 3044F PR MOST RECENT HEMOGLOBIN A1C LEVEL <7.0%: ICD-10-PCS | Mod: CPTII,95,, | Performed by: PSYCHIATRY & NEUROLOGY

## 2022-03-21 PROCEDURE — 3044F HG A1C LEVEL LT 7.0%: CPT | Mod: CPTII,95,, | Performed by: PSYCHIATRY & NEUROLOGY

## 2022-03-21 NOTE — ASSESSMENT & PLAN NOTE
Movement Neurology: Followed by TISH Velarde.      Behavioral Neurology: Referral placed.      Speech Therapy: Referral placed.      Neuropsychiatric Symptoms: Persistent visual hallucinations and irritability, both of which may be points of intervention. Referral to behavioral neurology.       Level of Care: Appropriately managed as her  is providing support in all complex activities of daily living.      Optimize Brain Health: Prioritize physical/social/cognitive activity/stimulation. Maintain a heart healthy diet.      Follow-up: Interval testing in 1-2 years.

## 2022-03-21 NOTE — PROGRESS NOTES
NEUROPSYCHOLOGICAL EVALUATION - CONFIDENTIAL  FEEDBACK NOTE    On 3/21/2022, I provided Ms. Shelby Thompson the neuropsychological evaluation results. Please see the full report for a comprehensive overview of the findings.    Milo Simms Psy.D., ABPP  Board Certified in Clinical Neuropsychology  [facility] - Department of Neurology

## 2022-03-22 NOTE — TELEPHONE ENCOUNTER
Called and spoke with patient and informed her that Dr. Simms requested that she follow up with Dr. Drummond. Appointment scheduled and appointment letter placed in the mail.

## 2022-03-24 ENCOUNTER — INFUSION (OUTPATIENT)
Dept: INFUSION THERAPY | Facility: HOSPITAL | Age: 72
End: 2022-03-24
Payer: MEDICARE

## 2022-03-24 VITALS
SYSTOLIC BLOOD PRESSURE: 143 MMHG | TEMPERATURE: 98 F | RESPIRATION RATE: 18 BRPM | DIASTOLIC BLOOD PRESSURE: 69 MMHG | HEART RATE: 64 BPM

## 2022-03-24 DIAGNOSIS — C90.02 MULTIPLE MYELOMA IN RELAPSE: ICD-10-CM

## 2022-03-24 DIAGNOSIS — C90.01 MULTIPLE MYELOMA IN REMISSION: Primary | ICD-10-CM

## 2022-03-24 PROCEDURE — 96401 CHEMO ANTI-NEOPL SQ/IM: CPT

## 2022-03-24 PROCEDURE — 63600175 PHARM REV CODE 636 W HCPCS: Mod: JG | Performed by: INTERNAL MEDICINE

## 2022-03-24 RX ORDER — BORTEZOMIB 3.5 MG/1
0.7 INJECTION, POWDER, LYOPHILIZED, FOR SOLUTION INTRAVENOUS; SUBCUTANEOUS
Status: COMPLETED | OUTPATIENT
Start: 2022-03-24 | End: 2022-03-24

## 2022-03-24 RX ADMIN — BORTEZOMIB 1.2 MG: 3.5 INJECTION, POWDER, LYOPHILIZED, FOR SOLUTION INTRAVENOUS; SUBCUTANEOUS at 10:03

## 2022-03-24 NOTE — NURSING
Patient received Velcade injection SQ to ABD.  Tolerated well.  Vitals stable.  No apparent distress noted.  Patient ambulated off unit with steady gait accompanied by friend.

## 2022-03-31 ENCOUNTER — INFUSION (OUTPATIENT)
Dept: INFUSION THERAPY | Facility: HOSPITAL | Age: 72
End: 2022-03-31
Payer: MEDICARE

## 2022-03-31 VITALS
HEIGHT: 63 IN | TEMPERATURE: 98 F | BODY MASS INDEX: 26.37 KG/M2 | SYSTOLIC BLOOD PRESSURE: 142 MMHG | HEART RATE: 60 BPM | DIASTOLIC BLOOD PRESSURE: 70 MMHG | RESPIRATION RATE: 18 BRPM | WEIGHT: 148.81 LBS

## 2022-03-31 DIAGNOSIS — C90.02 MULTIPLE MYELOMA IN RELAPSE: ICD-10-CM

## 2022-03-31 DIAGNOSIS — C90.01 MULTIPLE MYELOMA IN REMISSION: Primary | ICD-10-CM

## 2022-03-31 PROCEDURE — 96401 CHEMO ANTI-NEOPL SQ/IM: CPT

## 2022-03-31 PROCEDURE — 63600175 PHARM REV CODE 636 W HCPCS: Mod: JG | Performed by: INTERNAL MEDICINE

## 2022-03-31 RX ORDER — BORTEZOMIB 3.5 MG/1
0.7 INJECTION, POWDER, LYOPHILIZED, FOR SOLUTION INTRAVENOUS; SUBCUTANEOUS
Status: COMPLETED | OUTPATIENT
Start: 2022-03-31 | End: 2022-03-31

## 2022-03-31 RX ADMIN — BORTEZOMIB 1.2 MG: 3.5 INJECTION, POWDER, LYOPHILIZED, FOR SOLUTION INTRAVENOUS; SUBCUTANEOUS at 10:03

## 2022-04-04 ENCOUNTER — SPECIALTY PHARMACY (OUTPATIENT)
Dept: PHARMACY | Facility: CLINIC | Age: 72
End: 2022-04-04
Payer: MEDICARE

## 2022-04-07 ENCOUNTER — INFUSION (OUTPATIENT)
Dept: INFUSION THERAPY | Facility: HOSPITAL | Age: 72
End: 2022-04-07
Payer: MEDICARE

## 2022-04-07 VITALS
TEMPERATURE: 99 F | HEART RATE: 74 BPM | SYSTOLIC BLOOD PRESSURE: 154 MMHG | RESPIRATION RATE: 16 BRPM | DIASTOLIC BLOOD PRESSURE: 85 MMHG

## 2022-04-07 DIAGNOSIS — C90.01 MULTIPLE MYELOMA IN REMISSION: Primary | ICD-10-CM

## 2022-04-07 DIAGNOSIS — C90.02 MULTIPLE MYELOMA IN RELAPSE: ICD-10-CM

## 2022-04-07 PROCEDURE — 96401 CHEMO ANTI-NEOPL SQ/IM: CPT

## 2022-04-07 PROCEDURE — 63600175 PHARM REV CODE 636 W HCPCS: Mod: JG | Performed by: INTERNAL MEDICINE

## 2022-04-07 RX ORDER — BORTEZOMIB 3.5 MG/1
0.7 INJECTION, POWDER, LYOPHILIZED, FOR SOLUTION INTRAVENOUS; SUBCUTANEOUS
Status: COMPLETED | OUTPATIENT
Start: 2022-04-07 | End: 2022-04-07

## 2022-04-07 RX ADMIN — BORTEZOMIB 1.2 MG: 3.5 INJECTION, POWDER, LYOPHILIZED, FOR SOLUTION INTRAVENOUS; SUBCUTANEOUS at 10:04

## 2022-04-13 ENCOUNTER — LAB VISIT (OUTPATIENT)
Dept: LAB | Facility: HOSPITAL | Age: 72
End: 2022-04-13
Payer: MEDICARE

## 2022-04-13 ENCOUNTER — INFUSION (OUTPATIENT)
Dept: INFUSION THERAPY | Facility: HOSPITAL | Age: 72
End: 2022-04-13
Payer: MEDICARE

## 2022-04-13 ENCOUNTER — OFFICE VISIT (OUTPATIENT)
Dept: HEMATOLOGY/ONCOLOGY | Facility: CLINIC | Age: 72
End: 2022-04-13
Payer: MEDICARE

## 2022-04-13 VITALS
OXYGEN SATURATION: 98 % | WEIGHT: 148.56 LBS | SYSTOLIC BLOOD PRESSURE: 144 MMHG | RESPIRATION RATE: 16 BRPM | DIASTOLIC BLOOD PRESSURE: 75 MMHG | BODY MASS INDEX: 26.32 KG/M2 | HEIGHT: 63 IN | HEART RATE: 60 BPM

## 2022-04-13 DIAGNOSIS — G63 NEUROPATHY ASSOCIATED WITH CANCER: ICD-10-CM

## 2022-04-13 DIAGNOSIS — R06.02 SHORTNESS OF BREATH: ICD-10-CM

## 2022-04-13 DIAGNOSIS — C90.02 MULTIPLE MYELOMA IN RELAPSE: ICD-10-CM

## 2022-04-13 DIAGNOSIS — I69.30 HISTORY OF CVA WITH RESIDUAL DEFICIT: ICD-10-CM

## 2022-04-13 DIAGNOSIS — C80.1 NEUROPATHY ASSOCIATED WITH CANCER: ICD-10-CM

## 2022-04-13 DIAGNOSIS — C90.00 MULTIPLE MYELOMA, REMISSION STATUS UNSPECIFIED: ICD-10-CM

## 2022-04-13 DIAGNOSIS — E11.9 TYPE 2 DIABETES MELLITUS WITHOUT COMPLICATION, WITHOUT LONG-TERM CURRENT USE OF INSULIN: ICD-10-CM

## 2022-04-13 DIAGNOSIS — D75.9 DRUG-INDUCED CYTOPENIA: ICD-10-CM

## 2022-04-13 DIAGNOSIS — T50.905A DRUG-INDUCED CYTOPENIA: ICD-10-CM

## 2022-04-13 DIAGNOSIS — E78.00 PURE HYPERCHOLESTEROLEMIA: ICD-10-CM

## 2022-04-13 DIAGNOSIS — F41.9 ANXIETY AND DEPRESSION: ICD-10-CM

## 2022-04-13 DIAGNOSIS — C90.01 MULTIPLE MYELOMA IN REMISSION: Primary | ICD-10-CM

## 2022-04-13 DIAGNOSIS — I10 ESSENTIAL HYPERTENSION: ICD-10-CM

## 2022-04-13 DIAGNOSIS — Z94.84 HISTORY OF AUTOLOGOUS STEM CELL TRANSPLANT: Primary | ICD-10-CM

## 2022-04-13 DIAGNOSIS — F32.A ANXIETY AND DEPRESSION: ICD-10-CM

## 2022-04-13 DIAGNOSIS — G25.2 COARSE TREMORS: ICD-10-CM

## 2022-04-13 LAB
ALBUMIN SERPL BCP-MCNC: 4 G/DL (ref 3.5–5.2)
ALP SERPL-CCNC: 43 U/L (ref 55–135)
ALT SERPL W/O P-5'-P-CCNC: 7 U/L (ref 10–44)
ANION GAP SERPL CALC-SCNC: 10 MMOL/L (ref 8–16)
AST SERPL-CCNC: 13 U/L (ref 10–40)
BASOPHILS # BLD AUTO: 0.02 K/UL (ref 0–0.2)
BASOPHILS NFR BLD: 0.5 % (ref 0–1.9)
BILIRUB SERPL-MCNC: 0.4 MG/DL (ref 0.1–1)
BUN SERPL-MCNC: 19 MG/DL (ref 8–23)
CALCIUM SERPL-MCNC: 10 MG/DL (ref 8.7–10.5)
CHLORIDE SERPL-SCNC: 107 MMOL/L (ref 95–110)
CO2 SERPL-SCNC: 27 MMOL/L (ref 23–29)
CREAT SERPL-MCNC: 0.8 MG/DL (ref 0.5–1.4)
DIFFERENTIAL METHOD: ABNORMAL
EOSINOPHIL # BLD AUTO: 0 K/UL (ref 0–0.5)
EOSINOPHIL NFR BLD: 0.8 % (ref 0–8)
ERYTHROCYTE [DISTWIDTH] IN BLOOD BY AUTOMATED COUNT: 16.8 % (ref 11.5–14.5)
EST. GFR  (AFRICAN AMERICAN): >60 ML/MIN/1.73 M^2
EST. GFR  (NON AFRICAN AMERICAN): >60 ML/MIN/1.73 M^2
GLUCOSE SERPL-MCNC: 99 MG/DL (ref 70–110)
HCT VFR BLD AUTO: 34.7 % (ref 37–48.5)
HGB BLD-MCNC: 10.4 G/DL (ref 12–16)
IGA SERPL-MCNC: 53 MG/DL (ref 40–350)
IGG SERPL-MCNC: 1061 MG/DL (ref 650–1600)
IGM SERPL-MCNC: 36 MG/DL (ref 50–300)
IMM GRANULOCYTES # BLD AUTO: 0 K/UL (ref 0–0.04)
IMM GRANULOCYTES NFR BLD AUTO: 0 % (ref 0–0.5)
LYMPHOCYTES # BLD AUTO: 1 K/UL (ref 1–4.8)
LYMPHOCYTES NFR BLD: 27.2 % (ref 18–48)
MCH RBC QN AUTO: 24.4 PG (ref 27–31)
MCHC RBC AUTO-ENTMCNC: 30 G/DL (ref 32–36)
MCV RBC AUTO: 81 FL (ref 82–98)
MONOCYTES # BLD AUTO: 0.3 K/UL (ref 0.3–1)
MONOCYTES NFR BLD: 9 % (ref 4–15)
NEUTROPHILS # BLD AUTO: 2.3 K/UL (ref 1.8–7.7)
NEUTROPHILS NFR BLD: 62.5 % (ref 38–73)
NRBC BLD-RTO: 0 /100 WBC
PLATELET # BLD AUTO: 192 K/UL (ref 150–450)
PMV BLD AUTO: 11.8 FL (ref 9.2–12.9)
POTASSIUM SERPL-SCNC: 4 MMOL/L (ref 3.5–5.1)
PROT SERPL-MCNC: 7.6 G/DL (ref 6–8.4)
RBC # BLD AUTO: 4.27 M/UL (ref 4–5.4)
SODIUM SERPL-SCNC: 144 MMOL/L (ref 136–145)
WBC # BLD AUTO: 3.67 K/UL (ref 3.9–12.7)

## 2022-04-13 PROCEDURE — 86334 IMMUNOFIX E-PHORESIS SERUM: CPT | Mod: 26,,, | Performed by: PATHOLOGY

## 2022-04-13 PROCEDURE — 3077F PR MOST RECENT SYSTOLIC BLOOD PRESSURE >= 140 MM HG: ICD-10-PCS | Mod: CPTII,S$GLB,, | Performed by: NURSE PRACTITIONER

## 2022-04-13 PROCEDURE — 3044F HG A1C LEVEL LT 7.0%: CPT | Mod: CPTII,S$GLB,, | Performed by: NURSE PRACTITIONER

## 2022-04-13 PROCEDURE — 1126F AMNT PAIN NOTED NONE PRSNT: CPT | Mod: CPTII,S$GLB,, | Performed by: NURSE PRACTITIONER

## 2022-04-13 PROCEDURE — 99999 PR PBB SHADOW E&M-EST. PATIENT-LVL V: CPT | Mod: PBBFAC,,, | Performed by: NURSE PRACTITIONER

## 2022-04-13 PROCEDURE — 3077F SYST BP >= 140 MM HG: CPT | Mod: CPTII,S$GLB,, | Performed by: NURSE PRACTITIONER

## 2022-04-13 PROCEDURE — 3044F PR MOST RECENT HEMOGLOBIN A1C LEVEL <7.0%: ICD-10-PCS | Mod: CPTII,S$GLB,, | Performed by: NURSE PRACTITIONER

## 2022-04-13 PROCEDURE — 1160F PR REVIEW ALL MEDS BY PRESCRIBER/CLIN PHARMACIST DOCUMENTED: ICD-10-PCS | Mod: CPTII,S$GLB,, | Performed by: NURSE PRACTITIONER

## 2022-04-13 PROCEDURE — 82784 ASSAY IGA/IGD/IGG/IGM EACH: CPT | Performed by: INTERNAL MEDICINE

## 2022-04-13 PROCEDURE — 99214 PR OFFICE/OUTPT VISIT, EST, LEVL IV, 30-39 MIN: ICD-10-PCS | Mod: S$GLB,,, | Performed by: NURSE PRACTITIONER

## 2022-04-13 PROCEDURE — 3008F BODY MASS INDEX DOCD: CPT | Mod: CPTII,S$GLB,, | Performed by: NURSE PRACTITIONER

## 2022-04-13 PROCEDURE — 86334 PATHOLOGIST INTERPRETATION IFE: ICD-10-PCS | Mod: 26,,, | Performed by: PATHOLOGY

## 2022-04-13 PROCEDURE — 99214 OFFICE O/P EST MOD 30 MIN: CPT | Mod: S$GLB,,, | Performed by: NURSE PRACTITIONER

## 2022-04-13 PROCEDURE — 84165 PROTEIN E-PHORESIS SERUM: CPT | Performed by: INTERNAL MEDICINE

## 2022-04-13 PROCEDURE — 1159F MED LIST DOCD IN RCRD: CPT | Mod: CPTII,S$GLB,, | Performed by: NURSE PRACTITIONER

## 2022-04-13 PROCEDURE — 3288F PR FALLS RISK ASSESSMENT DOCUMENTED: ICD-10-PCS | Mod: CPTII,S$GLB,, | Performed by: NURSE PRACTITIONER

## 2022-04-13 PROCEDURE — 84165 PATHOLOGIST INTERPRETATION SPE: ICD-10-PCS | Mod: 26,,, | Performed by: PATHOLOGY

## 2022-04-13 PROCEDURE — 3288F FALL RISK ASSESSMENT DOCD: CPT | Mod: CPTII,S$GLB,, | Performed by: NURSE PRACTITIONER

## 2022-04-13 PROCEDURE — 3008F PR BODY MASS INDEX (BMI) DOCUMENTED: ICD-10-PCS | Mod: CPTII,S$GLB,, | Performed by: NURSE PRACTITIONER

## 2022-04-13 PROCEDURE — 1126F PR PAIN SEVERITY QUANTIFIED, NO PAIN PRESENT: ICD-10-PCS | Mod: CPTII,S$GLB,, | Performed by: NURSE PRACTITIONER

## 2022-04-13 PROCEDURE — 1101F PR PT FALLS ASSESS DOC 0-1 FALLS W/OUT INJ PAST YR: ICD-10-PCS | Mod: CPTII,S$GLB,, | Performed by: NURSE PRACTITIONER

## 2022-04-13 PROCEDURE — 4010F ACE/ARB THERAPY RXD/TAKEN: CPT | Mod: CPTII,S$GLB,, | Performed by: NURSE PRACTITIONER

## 2022-04-13 PROCEDURE — 25000003 PHARM REV CODE 250: Performed by: INTERNAL MEDICINE

## 2022-04-13 PROCEDURE — 86334 IMMUNOFIX E-PHORESIS SERUM: CPT | Performed by: INTERNAL MEDICINE

## 2022-04-13 PROCEDURE — 83520 IMMUNOASSAY QUANT NOS NONAB: CPT | Performed by: INTERNAL MEDICINE

## 2022-04-13 PROCEDURE — 1159F PR MEDICATION LIST DOCUMENTED IN MEDICAL RECORD: ICD-10-PCS | Mod: CPTII,S$GLB,, | Performed by: NURSE PRACTITIONER

## 2022-04-13 PROCEDURE — 84165 PROTEIN E-PHORESIS SERUM: CPT | Mod: 26,,, | Performed by: PATHOLOGY

## 2022-04-13 PROCEDURE — 3078F PR MOST RECENT DIASTOLIC BLOOD PRESSURE < 80 MM HG: ICD-10-PCS | Mod: CPTII,S$GLB,, | Performed by: NURSE PRACTITIONER

## 2022-04-13 PROCEDURE — 1160F RVW MEDS BY RX/DR IN RCRD: CPT | Mod: CPTII,S$GLB,, | Performed by: NURSE PRACTITIONER

## 2022-04-13 PROCEDURE — 3078F DIAST BP <80 MM HG: CPT | Mod: CPTII,S$GLB,, | Performed by: NURSE PRACTITIONER

## 2022-04-13 PROCEDURE — 1101F PT FALLS ASSESS-DOCD LE1/YR: CPT | Mod: CPTII,S$GLB,, | Performed by: NURSE PRACTITIONER

## 2022-04-13 PROCEDURE — 85025 COMPLETE CBC W/AUTO DIFF WBC: CPT | Performed by: INTERNAL MEDICINE

## 2022-04-13 PROCEDURE — 99999 PR PBB SHADOW E&M-EST. PATIENT-LVL V: ICD-10-PCS | Mod: PBBFAC,,, | Performed by: NURSE PRACTITIONER

## 2022-04-13 PROCEDURE — 36415 COLL VENOUS BLD VENIPUNCTURE: CPT | Performed by: INTERNAL MEDICINE

## 2022-04-13 PROCEDURE — 96401 CHEMO ANTI-NEOPL SQ/IM: CPT

## 2022-04-13 PROCEDURE — 63600175 PHARM REV CODE 636 W HCPCS: Mod: TB | Performed by: INTERNAL MEDICINE

## 2022-04-13 PROCEDURE — 80053 COMPREHEN METABOLIC PANEL: CPT | Performed by: INTERNAL MEDICINE

## 2022-04-13 PROCEDURE — 4010F PR ACE/ARB THEARPY RXD/TAKEN: ICD-10-PCS | Mod: CPTII,S$GLB,, | Performed by: NURSE PRACTITIONER

## 2022-04-13 RX ORDER — BORTEZOMIB 3.5 MG/1
0.7 INJECTION, POWDER, LYOPHILIZED, FOR SOLUTION INTRAVENOUS; SUBCUTANEOUS
Status: CANCELLED | OUTPATIENT
Start: 2022-04-14

## 2022-04-13 RX ORDER — DIPHENHYDRAMINE HYDROCHLORIDE 50 MG/ML
50 INJECTION INTRAMUSCULAR; INTRAVENOUS ONCE AS NEEDED
Status: CANCELLED | OUTPATIENT
Start: 2022-04-28

## 2022-04-13 RX ORDER — BORTEZOMIB 3.5 MG/1
0.7 INJECTION, POWDER, LYOPHILIZED, FOR SOLUTION INTRAVENOUS; SUBCUTANEOUS
Status: CANCELLED | OUTPATIENT
Start: 2022-04-21

## 2022-04-13 RX ORDER — BORTEZOMIB 3.5 MG/1
0.7 INJECTION, POWDER, LYOPHILIZED, FOR SOLUTION INTRAVENOUS; SUBCUTANEOUS
Status: CANCELLED | OUTPATIENT
Start: 2022-05-05

## 2022-04-13 RX ORDER — DIPHENHYDRAMINE HYDROCHLORIDE 50 MG/ML
50 INJECTION INTRAMUSCULAR; INTRAVENOUS ONCE AS NEEDED
Status: CANCELLED | OUTPATIENT
Start: 2022-04-14

## 2022-04-13 RX ORDER — EPINEPHRINE 0.3 MG/.3ML
0.3 INJECTION SUBCUTANEOUS ONCE AS NEEDED
Status: CANCELLED | OUTPATIENT
Start: 2022-05-05

## 2022-04-13 RX ORDER — ACETAMINOPHEN 325 MG/1
650 TABLET ORAL
Status: COMPLETED | OUTPATIENT
Start: 2022-04-13 | End: 2022-04-13

## 2022-04-13 RX ORDER — EPINEPHRINE 0.3 MG/.3ML
0.3 INJECTION SUBCUTANEOUS ONCE AS NEEDED
Status: CANCELLED | OUTPATIENT
Start: 2022-04-28

## 2022-04-13 RX ORDER — DIPHENHYDRAMINE HCL 25 MG
25 CAPSULE ORAL
Status: COMPLETED | OUTPATIENT
Start: 2022-04-13 | End: 2022-04-13

## 2022-04-13 RX ORDER — DIPHENHYDRAMINE HCL 25 MG
25 CAPSULE ORAL
Status: CANCELLED | OUTPATIENT
Start: 2022-04-14

## 2022-04-13 RX ORDER — EPINEPHRINE 0.3 MG/.3ML
0.3 INJECTION SUBCUTANEOUS ONCE AS NEEDED
Status: DISCONTINUED | OUTPATIENT
Start: 2022-04-13 | End: 2022-04-13 | Stop reason: HOSPADM

## 2022-04-13 RX ORDER — EPINEPHRINE 0.3 MG/.3ML
0.3 INJECTION SUBCUTANEOUS ONCE AS NEEDED
Status: CANCELLED | OUTPATIENT
Start: 2022-04-14

## 2022-04-13 RX ORDER — DIPHENHYDRAMINE HYDROCHLORIDE 50 MG/ML
50 INJECTION INTRAMUSCULAR; INTRAVENOUS ONCE AS NEEDED
Status: CANCELLED | OUTPATIENT
Start: 2022-05-05

## 2022-04-13 RX ORDER — BORTEZOMIB 3.5 MG/1
0.7 INJECTION, POWDER, LYOPHILIZED, FOR SOLUTION INTRAVENOUS; SUBCUTANEOUS
Status: COMPLETED | OUTPATIENT
Start: 2022-04-13 | End: 2022-04-13

## 2022-04-13 RX ORDER — DIPHENHYDRAMINE HYDROCHLORIDE 50 MG/ML
50 INJECTION INTRAMUSCULAR; INTRAVENOUS ONCE AS NEEDED
Status: CANCELLED | OUTPATIENT
Start: 2022-04-21

## 2022-04-13 RX ORDER — BORTEZOMIB 3.5 MG/1
0.7 INJECTION, POWDER, LYOPHILIZED, FOR SOLUTION INTRAVENOUS; SUBCUTANEOUS
Status: CANCELLED | OUTPATIENT
Start: 2022-04-28

## 2022-04-13 RX ORDER — DIPHENHYDRAMINE HYDROCHLORIDE 50 MG/ML
50 INJECTION INTRAMUSCULAR; INTRAVENOUS ONCE AS NEEDED
Status: DISCONTINUED | OUTPATIENT
Start: 2022-04-13 | End: 2022-04-13 | Stop reason: HOSPADM

## 2022-04-13 RX ORDER — ACETAMINOPHEN 325 MG/1
650 TABLET ORAL
Status: CANCELLED | OUTPATIENT
Start: 2022-04-14

## 2022-04-13 RX ORDER — EPINEPHRINE 0.3 MG/.3ML
0.3 INJECTION SUBCUTANEOUS ONCE AS NEEDED
Status: CANCELLED | OUTPATIENT
Start: 2022-04-21

## 2022-04-13 RX ADMIN — BORTEZOMIB 1.2 MG: 3.5 INJECTION, POWDER, LYOPHILIZED, FOR SOLUTION INTRAVENOUS; SUBCUTANEOUS at 04:04

## 2022-04-13 RX ADMIN — DARATUMUMAB AND HYALURONIDASE-FIHJ (HUMAN RECOMBINANT) 1800 MG: 1800; 30000 INJECTION SUBCUTANEOUS at 04:04

## 2022-04-13 RX ADMIN — ACETAMINOPHEN 650 MG: 325 TABLET ORAL at 03:04

## 2022-04-13 RX ADMIN — DIPHENHYDRAMINE HYDROCHLORIDE 25 MG: 25 CAPSULE ORAL at 03:04

## 2022-04-13 NOTE — PROGRESS NOTES
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  She achieved and tolerated well VRd x completing 7, 28 day cycles and achieving deep VGPR to induction. Then underwent Melphalan autologous stem cell transplant on 2/12/2020.      Extensive PMH as below.    2 prior CVAs (one in 2008, one in 2011)  L breast cancer DCIS stage 0 (s/p lumpectomy and radiation, no endocrine tx due to CVA)  HTN  DM II  Neuropathy, b/l hands  Prior smoker, quit in 2011  Hepatic lesions - vascular per recent MRI in 09 /2019 with no changes; will confirm no further rascon needed from help  Statin Intolerance - on praluent, PCSK9 inhibitor  HFpEF - EF 55%, diastolic dysfunction  Anxiety/Depression     ADMISSION SUMMARY: 2/10-2/26/2020  Admitted 2/10, tolerated chemo and transplant well. Engrafted on day +12. Remained afebrile throughout hospital stay and no mucositis. Experienced expected side effects of nausea and diarrhea controlled with antiemetics and Imodium. Discharged home with home PT/OT       Subjective:       Initial History: Ms. Thompson is a 72 y.o. female who returns for follow up of multiple myeloma in remission. She is 2 year 2 month post AutoSCT.   On kenia/zhanna/dex after biochemical relapse noted approximately 1 year post transplant. Tolerating and responding well. post-stroke cognitive dysfunction, but with progressive decline over the past several years. Her  had recent surgery, so limited help with house work.. Her friend assist her for transportation and appointments. Following by neurologist for cognitive/memory decline.   Fingertip  neuropathy stable.Accompanied by her friend.    Today is C10D1 kenia/zhanna    Otherwise no signs symptoms of kenia/zhanna intolerance.     Past Medical History:   Past Medical History:   Diagnosis Date    Acute respiratory failure with hypoxia 4/30/2019    FUENTES (acute kidney injury) 5/1/2019    Allergy     Anemia     Aortic aneurysm     Breast cancer 10/2011    left breast Stage 0 DCIS    Cataract     Chronic diastolic congestive heart failure 11/6/2015    Colon polyp     Community acquired pneumonia of right lower lobe of lung 8/3/2021    GERD (gastroesophageal reflux disease)     Hiatal hernia     History of colonic polyps     Hx of psychiatric care     Zoloft    HX: breast cancer     Hyperlipemia     Hypertension     ICH (intracerebral hemorrhage)     Major depressive disorder, single episode, mild 6/23/2016    Nuclear sclerosis 7/21/2014    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 7/21/2014    PAD (peripheral artery disease) 11/6/2015    Psychiatric problem     Statin-induced rhabdomyolysis     4/2015     Stroke 4/2011    Type 2 diabetes mellitus with diabetic peripheral angiopathy without gangrene, with long-term current use of insulin 3/31/2021    Type 2 diabetes mellitus without complication, without long-term current use of insulin 2/10/2020    Walker as ambulation aid        Past Surgical HIstory:   Past Surgical History:   Procedure Laterality Date    APPENDECTOMY      BONE MARROW BIOPSY Left 10/3/2019    Procedure: Biopsy-bone marrow;  Surgeon: Jere Tineo MD;  Location[facility] OR [address] [place] [address];  Service: Oncology;  Laterality: Left;    BREAST BIOPSY Left 10/2011    BREAST LUMPECTOMY Left 2011    DCIS    COLONOSCOPY      COLONOSCOPY N/A 1/9/2020    Procedure: COLONOSCOPY;  Surgeon: Quang De La Cruz MD;  Location: [facility] (4TH FLR);  Service: Endoscopy;  Laterality: N/A;    CYST REMOVAL      back    ESOPHAGOGASTRODUODENOSCOPY  07/2016    duodenal  angioectasia    ESOPHAGOGASTRODUODENOSCOPY N/A 1/9/2020    Procedure: EGD (ESOPHAGOGASTRODUODENOSCOPY);  Surgeon: Quang De La Cruz MD;  Location: [facility] ([address] [place] [address]);  Service: Endoscopy;  Laterality: N/A;    FOOT FRACTURE SURGERY  10/2012    right foot, with R hallux valgus repair    FRACTURE SURGERY Right     broken toe repiar    HEMORRHOID SURGERY      LIVER BIOPSY  04/2015    essentially normal, no fibrosis    TUBAL LIGATION      UPPER GASTROINTESTINAL ENDOSCOPY         Family History:   Family History   Problem Relation Age of Onset    Dementia Sister         x1 sister    Cancer Sister 63        Lung Cancer    No Known Problems Mother     Cancer Father     No Known Problems Brother     Hypertension Daughter     Fibroids Daughter         uterine    Hypertension Son     No Known Problems Brother     No Known Problems Maternal Aunt     No Known Problems Maternal Uncle     No Known Problems Paternal Aunt     No Known Problems Paternal Uncle     Hypertension Maternal Grandmother     No Known Problems Maternal Grandfather     No Known Problems Paternal Grandmother     No Known Problems Paternal Grandfather     Ovarian cancer Neg Hx     Colon cancer Neg Hx     Tremor Neg Hx     Amblyopia Neg Hx     Blindness Neg Hx     Cataracts Neg Hx     Diabetes Neg Hx     Glaucoma Neg Hx     Macular degeneration Neg Hx     Retinal detachment Neg Hx     Strabismus Neg Hx     Stroke Neg Hx     Thyroid disease Neg Hx     Esophageal cancer Neg Hx     Rectal cancer Neg Hx     Stomach cancer Neg Hx     Ulcerative colitis Neg Hx     Crohn's disease Neg Hx     Irritable bowel syndrome Neg Hx     Celiac disease Neg Hx        Social History:  reports that she quit smoking about 11 years ago. Her smoking use included cigarettes. She started smoking about 55 years ago. She has a 11.00 pack-year smoking history. She has never used smokeless tobacco. She reports previous alcohol use. She reports that  she does not use drugs.    Allergies:  Review of patient's allergies indicates:   Allergen Reactions    Lipitor [atorvastatin] Itching     Itching, elevated cpk, muscle aches, statin induced myositis       Medications:  Current Outpatient Medications   Medication Sig Dispense Refill    acyclovir (ZOVIRAX) 400 MG tablet Take by mouth 2 (two) times daily.      albuterol (PROVENTIL HFA) 90 mcg/actuation inhaler Inhale 2 puffs into the lungs every 6 (six) hours as needed for Wheezing. Rescue 6.7 g 0    alirocumab (PRALUENT PEN) 75 mg/mL PnIj Inject 1 mL (75 mg total) into the skin every 14 (fourteen) days. 6 mL 3    amLODIPine (NORVASC) 5 MG tablet TAKE 1 TABLET BY MOUTH EVERY DAY 90 tablet 3    aspirin (ECOTRIN) 81 MG EC tablet Take 1 tablet (81 mg total) by mouth once daily. 90 tablet 3    cholecalciferol, vitamin D3, 125 mcg (5,000 unit) Tab 1 tablet DAILY (route: oral)      ENGERIX-B, PF, 20 mcg/mL Syrg       gabapentin (NEURONTIN) 300 MG capsule Take 2 capsules (600 mg total) by mouth 2 (two) times daily. 180 capsule 2    metFORMIN (GLUCOPHAGE) 1000 MG tablet Take 1 tablet (1,000 mg total) by mouth 2 (two) times daily with meals. 180 tablet 3    omeprazole (PRILOSEC) 40 MG capsule TAKE 1 CAPSULE BY MOUTH EVERY DAY 90 capsule 3    potassium chloride SA (K-DUR,KLOR-CON) 20 MEQ tablet TAKE 1 TABLET BY MOUTH ONCE DAILY 90 tablet 3    traMADoL (ULTRAM) 50 mg tablet Take 1 tablet (50 mg total) by mouth 2 (two) times daily as needed for Pain. 60 tablet 3    valsartan (DIOVAN) 80 MG tablet Take 1 tablet (80 mg total) by mouth once daily. 90 tablet 3    VELCADE 3.5 mg injection       zoledronic 4 mg/100 mL PgBk infusion       albuterol (ACCUNEB) 1.25 mg/3 mL Nebu Take 3 mLs (1.25 mg total) by nebulization every 6 (six) hours as needed (wheeze/cough). Rescue (Patient not taking: No sig reported) 75 mL 2    benzonatate (TESSALON) 100 MG capsule Take 1 capsule (100 mg total) by mouth 3 (three) times daily as  needed for Cough. (Patient not taking: No sig reported) 30 capsule 0    guaiFENesin (MUCINEX) 600 mg 12 hr tablet Take 1 tablet (600 mg total) by mouth 2 (two) times daily. (Patient not taking: No sig reported) 30 tablet 0    ondansetron (ZOFRAN-ODT) 4 MG TbDL Take 1 tablet (4 mg total) by mouth every 6 (six) hours as needed (nausea). (Patient not taking: No sig reported) 12 tablet 0     No current facility-administered medications for this visit.       Review of Systems   Constitutional: Positive for fatigue. Negative for appetite change, chills and fever.   HENT: Negative for ear discharge, ear pain, nosebleeds, postnasal drip, rhinorrhea, sinus pressure and sore throat.    Eyes: Negative for pain.   Respiratory: Negative for chest tightness and shortness of breath.    Cardiovascular: Negative for chest pain, palpitations and leg swelling.   Gastrointestinal: Negative for abdominal distention, abdominal pain, blood in stool, constipation, diarrhea, nausea and vomiting.   Genitourinary: Negative.  Negative for dysuria and hematuria.   Musculoskeletal: Negative.  Negative for back pain, gait problem and myalgias.   Skin: Negative.    Neurological: Positive for tremors and numbness. Negative for syncope and headaches.   Hematological: Negative for adenopathy. Does not bruise/bleed easily.   Psychiatric/Behavioral: The patient is not nervous/anxious.         Memory issues       ECOG Performance Status: 2 (due to remote  CVA)   Objective:      Vitals:   Vitals:    04/13/22 1414   BP: (!) 144/75   Pulse: 60   Resp: 16        PE:   General -in wheelchair  no apparent distress  HEENT - oropharynx clear  Chest and Lung -bilateral end expiratory wheezes  Cardiovascular - RRR with no MGR, normal S1 and S2  Abdomen-  soft, nontender, no palpable hepatomegaly or splenomegaly  Lymph - no palpable lymphadenopathy  Heme - no bruising, petechiae, pallor  Skin - no rashes or lesions  Psych - appropriate mood and affect  Neuro -   No change in chronic residual CVA symptoms    Laboratory Data:  Lab Visit on 04/13/2022   Component Date Value Ref Range Status    WBC 04/13/2022 3.67 (A) 3.90 - 12.70 K/uL Final    RBC 04/13/2022 4.27  4.00 - 5.40 M/uL Final    Hemoglobin 04/13/2022 10.4 (A) 12.0 - 16.0 g/dL Final    Hematocrit 04/13/2022 34.7 (A) 37.0 - 48.5 % Final    MCV 04/13/2022 81 (A) 82 - 98 fL Final    MCH 04/13/2022 24.4 (A) 27.0 - 31.0 pg Final    MCHC 04/13/2022 30.0 (A) 32.0 - 36.0 g/dL Final    RDW 04/13/2022 16.8 (A) 11.5 - 14.5 % Final    Platelets 04/13/2022 192  150 - 450 K/uL Final    MPV 04/13/2022 11.8  9.2 - 12.9 fL Final    Immature Granulocytes 04/13/2022 0.0  0.0 - 0.5 % Final    Gran # (ANC) 04/13/2022 2.3  1.8 - 7.7 K/uL Final    Immature Grans (Abs) 04/13/2022 0.00  0.00 - 0.04 K/uL Final    Comment: Mild elevation in immature granulocytes is non specific and   can be seen in a variety of conditions including stress response,   acute inflammation, trauma and pregnancy. Correlation with other   laboratory and clinical findings is essential.      Lymph # 04/13/2022 1.0  1.0 - 4.8 K/uL Final    Mono # 04/13/2022 0.3  0.3 - 1.0 K/uL Final    Eos # 04/13/2022 0.0  0.0 - 0.5 K/uL Final    Baso # 04/13/2022 0.02  0.00 - 0.20 K/uL Final    nRBC 04/13/2022 0  0 /100 WBC Final    Gran % 04/13/2022 62.5  38.0 - 73.0 % Final    Lymph % 04/13/2022 27.2  18.0 - 48.0 % Final    Mono % 04/13/2022 9.0  4.0 - 15.0 % Final    Eosinophil % 04/13/2022 0.8  0.0 - 8.0 % Final    Basophil % 04/13/2022 0.5  0.0 - 1.9 % Final    Differential Method 04/13/2022 Automated   Final    Sodium 04/13/2022 144  136 - 145 mmol/L Final    Potassium 04/13/2022 4.0  3.5 - 5.1 mmol/L Final    Chloride 04/13/2022 107  95 - 110 mmol/L Final    CO2 04/13/2022 27  23 - 29 mmol/L Final    Glucose 04/13/2022 99  70 - 110 mg/dL Final    BUN 04/13/2022 19  8 - 23 mg/dL Final    Creatinine 04/13/2022 0.8  0.5 - 1.4 mg/dL Final    Calcium  04/13/2022 10.0  8.7 - 10.5 mg/dL Final    Total Protein 04/13/2022 7.6  6.0 - 8.4 g/dL Final    Albumin 04/13/2022 4.0  3.5 - 5.2 g/dL Final    Total Bilirubin 04/13/2022 0.4  0.1 - 1.0 mg/dL Final    Comment: For infants and newborns, interpretation of results should be based  on gestational age, weight and in agreement with clinical  observations.    Premature Infant recommended reference ranges:  Up to 24 hours.............<8.0 mg/dL  Up to 48 hours............<12.0 mg/dL  3-5 days..................<15.0 mg/dL  6-29 days.................<15.0 mg/dL      Alkaline Phosphatase 04/13/2022 43 (A) 55 - 135 U/L Final    AST 04/13/2022 13  10 - 40 U/L Final    ALT 04/13/2022 7 (A) 10 - 44 U/L Final    Anion Gap 04/13/2022 10  8 - 16 mmol/L Final    eGFR if African American 04/13/2022 >60.0  >60 mL/min/1.73 m^2 Final    eGFR if non African American 04/13/2022 >60.0  >60 mL/min/1.73 m^2 Final    Comment: Calculation used to obtain the estimated glomerular filtration  rate (eGFR) is the CKD-EPI equation.       IgG 04/13/2022 1061  650 - 1600 mg/dL Final    IgG Cord Blood Reference Range: 650-1600 mg/dL.    IgA 04/13/2022 53  40 - 350 mg/dL Final    IgA Cord Blood Reference Range: <5 mg/dL.    IgM 04/13/2022 36 (A) 50 - 300 mg/dL Final    IgM Cord Blood Reference Range: <25 mg/dL.    Protein, Serum 04/13/2022 7.2  6.0 - 8.4 g/dL Final    Comment: Serum protein electrophoresis and immunofixation results should be   interpreted in clinical context in that some therapeutic agents can   result   in false positive results (example, daratumumab). Correlation with   the   patient s therapeutic regimen is required.        Lab Results   Component Value Date    WBC 3.67 (L) 04/13/2022    HGB 10.4 (L) 04/13/2022    HCT 34.7 (L) 04/13/2022    MCV 81 (L) 04/13/2022     04/13/2022       Gran # (ANC)   Date Value Ref Range Status   04/13/2022 2.3 1.8 - 7.7 K/uL Final     Gran %   Date Value Ref Range Status    04/13/2022 62.5 38.0 - 73.0 % Final     Kappa Free Light Chains   Date Value Ref Range Status   03/17/2022 6.42 (H) 0.33 - 1.94 mg/dL Final   02/17/2022 5.01 (H) 0.33 - 1.94 mg/dL Final   01/13/2022 3.77 (H) 0.33 - 1.94 mg/dL Final     Lambda Free Light Chains   Date Value Ref Range Status   03/17/2022 1.40 0.57 - 2.63 mg/dL Final   02/17/2022 1.63 0.57 - 2.63 mg/dL Final   01/13/2022 2.45 0.57 - 2.63 mg/dL Final     Kappa/Lambda FLC Ratio   Date Value Ref Range Status   03/17/2022 4.59 (H) 0.26 - 1.65 Final     Comment:     Undetected antigen excess is a rare event but cannot   be excluded. If these free light chain results do not   agree with other clinical or laboratory findings or   if the sample is from a patient that has previously   demonstrated antigen excess, discuss with the testing   laboratory.   Results should always be interpreted in conjunction   with other laboratory tests and clinical evidence.     02/17/2022 3.07 (H) 0.26 - 1.65 Final     Comment:     Undetected antigen excess is a rare event but cannot   be excluded. If these free light chain results do not   agree with other clinical or laboratory findings or   if the sample is from a patient that has previously   demonstrated antigen excess, discuss with the testing   laboratory.   Results should always be interpreted in conjunction   with other laboratory tests and clinical evidence.     01/13/2022 1.54 0.26 - 1.65 Final     Comment:     Undetected antigen excess is a rare event but cannot   be excluded. If these free light chain results do not   agree with other clinical or laboratory findings or   if the sample is from a patient that has previously   demonstrated antigen excess, discuss with the testing   laboratory.   Results should always be interpreted in conjunction   with other laboratory tests and clinical evidence.       IgG   Date Value Ref Range Status   04/13/2022 1061 650 - 1600 mg/dL Final     Comment:     IgG Cord Blood  Reference Range: 650-1600 mg/dL.     IgA   Date Value Ref Range Status   04/13/2022 53 40 - 350 mg/dL Final     Comment:     IgA Cord Blood Reference Range: <5 mg/dL.     IgM   Date Value Ref Range Status   04/13/2022 36 (L) 50 - 300 mg/dL Final     Comment:     IgM Cord Blood Reference Range: <25 mg/dL.   Pathologist Interpretation SPE  Pathologist Interpretation SPE  Collected: 01/13/22 1121   Result status: Final   Resulting lab: [facility]   Value: REVIEWED   Comment:   Electronically reviewed and signed by:   Destiny Lopes M.D.   Signed on 01/18/22 at 14:00   Normal total protein.   Normal gamma globulins are decreased.   Two closely adjacent paraprotein bands:   1) Near-gamma = 0.22 g/dL (previously 0.19 g/dL).   2) Mid-gamma = 0.17 g/dL (previously, 0.15 g/dL).    *Additional information available - comment           Imaging:      Assessment:       Ms. Thompson is a 71 year old female with relapsed multiple myeloma.     Plan:     MM s/p Auto SCT  - high risk MM with 17p deletion  - 2 year 2 month  s/p Auto SCT  - started maintenance q2w velcade 5/6/20. Receiving C10 today  - serial biochemical relapse  Noted and  given this and high risk CG , transitioned to Sravanthi/Noble (1mg/m2) /dex  Salvage to which she is tolerating.  On weekly velcade, sravanthi weekly x8 >EOW x 8 doses, then q 4 weeks  - Due to uncontrolled hyperglycemia dex stopped after C4. Continue on sravanthi/noble until progression. Sravanthi q 4 weekly, weekly velcade.  velcade decreased to 1mg/m2 for grade 1 neuropathy which is stable.   -supportive meds:   continue ppx acyclovir, asa; resume maintenance zometa q 3 months for 1 year; dose today; next due mid April 2022  - shows progression on recent studies. SPEP on 03/17 - 0.37 g/dL, previously 0.24 g/dL. Today's level pending. If myeloma studies progressing will consider another line of therapy.  -post transplant vaccines completed     anemia due to chemotherapy  - stable mild  -due to  "therapy monitor      Neuropathy  -Stable   - continues gabapentin and prn tramadol  -dose reduced velcade further to 0.7mg/m2 moving forward (12/16/21)     SOB/descending aortic aneurysm  - CTA and dopplers ordered urgently with high concern for DVT/PE; completed 8/3/20  - incidental descending thoracic aortic aneurysm noted; referred to thoracic surgery; seen 8/7/20; per note: "at current size would not recommend any intervention as risk of rupture remains low.  Recommend surveillance CT chest every 12 months to monitor growth of the aneurysm.  Would typically recommend aspirin 81 mg daily in patients with aortic aneurysm  - ASA started (9/14/20)     HTN  - continue norvasc  - Patient to monitor BP closely  Anxiety/depression  - continue zoloft    DM2  - diet controlled    Hx of CVA  - s/p 2008, 2011    HLD with Statin Intolerance  -on praluent, PCSK9 inhibitor    L breast cancer DCIS stage 0  -s/p lumpectomy and radiation, no endocrine tx due to CVA    Coarse tremors/memory issues  -  Stable coarse tremors   - Short term memory issues worsened  - repeat MRI brain 12/16/21 Multifocal areas of remote parenchymal hemorrhage similar to prior exam, possibility of underlying cerebral amyloid angiopathy.  - Close f/u with neuro.       BMT Chart Routing      Follow up with physician 4 weeks. /NP   Follow up with CHRISTY    Labs CBC, CMP, immunoglobulins, SPEP and free light chains   Lab interval:  Keep scheduled chemo, velcade on 04/14,04/21,04/28. add velcade on 05/05, Schedule kenia, velcade on 05/12. cbc, cmp, myeloma studies,MD visit on 05/12   Imaging    Pharmacy appointment    Other referrals          Adina Wright NP  Hematology/Oncology/BMT          "

## 2022-04-13 NOTE — PLAN OF CARE
Pt arrived AAOx3, VSS, labs reviewed, orders signed. Pt tolerated darzalex SQ and velcade SQ with no problems and was discharged in stable ambulatory condition with caretaker to home.

## 2022-04-14 LAB
ALBUMIN SERPL ELPH-MCNC: 4.32 G/DL (ref 3.35–5.55)
ALPHA1 GLOB SERPL ELPH-MCNC: 0.3 G/DL (ref 0.17–0.41)
ALPHA2 GLOB SERPL ELPH-MCNC: 0.9 G/DL (ref 0.43–0.99)
B-GLOBULIN SERPL ELPH-MCNC: 0.76 G/DL (ref 0.5–1.1)
GAMMA GLOB SERPL ELPH-MCNC: 0.92 G/DL (ref 0.67–1.58)
INTERPRETATION SERPL IFE-IMP: NORMAL
KAPPA LC SER QL IA: 8.09 MG/DL (ref 0.33–1.94)
KAPPA LC/LAMBDA SER IA: 6.52 (ref 0.26–1.65)
LAMBDA LC SER QL IA: 1.24 MG/DL (ref 0.57–2.63)
PATHOLOGIST INTERPRETATION IFE: NORMAL
PATHOLOGIST INTERPRETATION SPE: NORMAL
PROT SERPL-MCNC: 7.2 G/DL (ref 6–8.4)

## 2022-04-14 NOTE — TELEPHONE ENCOUNTER
Specialty Pharmacy - Refill Coordination    Specialty Medication Orders Linked to Encounter    Flowsheet Row Most Recent Value   Medication #1 alirocumab (PRALUENT PEN) 75 mg/mL PnIj (Order#[id number], Rx#[id number])          Refill Questions - Documented Responses    Flowsheet Row Most Recent Value   Patient Availability and HIPAA Verification    Does patient want to proceed with activity? Yes   HIPAA/medical authority confirmed? Yes   Relationship to patient of person spoken to? Self   Refill Screening Questions    Would patient like to speak to a pharmacist? No   When does the patient need to receive the medication? 04/22/22   Refill Delivery Questions    How will the patient receive the medication? Delivery Laura   When does the patient need to receive the medication? 04/22/22   Shipping Address Home   Address in [facility] confirmed and updated if neccessary? Yes   Expected Copay ($) 0   Is the patient able to afford the medication copay? Yes   Payment Method zero copay   Days supply of Refill 28   Supplies needed? No supplies needed   Refill activity completed? Yes   Refill activity plan Refill scheduled   Shipment/Pickup Date: 04/20/22          Current Outpatient Medications   Medication Sig    acyclovir (ZOVIRAX) 400 MG tablet Take by mouth 2 (two) times daily.    albuterol (ACCUNEB) 1.25 mg/3 mL Nebu Take 3 mLs (1.25 mg total) by nebulization every 6 (six) hours as needed (wheeze/cough). Rescue (Patient not taking: No sig reported)    albuterol (PROVENTIL HFA) 90 mcg/actuation inhaler Inhale 2 puffs into the lungs every 6 (six) hours as needed for Wheezing. Rescue    alirocumab (PRALUENT PEN) 75 mg/mL PnIj Inject 1 mL (75 mg total) into the skin every 14 (fourteen) days.    amLODIPine (NORVASC) 5 MG tablet TAKE 1 TABLET BY MOUTH EVERY DAY    aspirin (ECOTRIN) 81 MG EC tablet Take 1 tablet (81 mg total) by mouth once daily.    benzonatate (TESSALON) 100 MG capsule Take 1 capsule (100 mg total)  by mouth 3 (three) times daily as needed for Cough. (Patient not taking: No sig reported)    cholecalciferol, vitamin D3, 125 mcg (5,000 unit) Tab 1 tablet DAILY (route: oral)    ENGERIX-B, PF, 20 mcg/mL Syrg     gabapentin (NEURONTIN) 300 MG capsule Take 2 capsules (600 mg total) by mouth 2 (two) times daily.    guaiFENesin (MUCINEX) 600 mg 12 hr tablet Take 1 tablet (600 mg total) by mouth 2 (two) times daily. (Patient not taking: No sig reported)    metFORMIN (GLUCOPHAGE) 1000 MG tablet Take 1 tablet (1,000 mg total) by mouth 2 (two) times daily with meals.    omeprazole (PRILOSEC) 40 MG capsule TAKE 1 CAPSULE BY MOUTH EVERY DAY    ondansetron (ZOFRAN-ODT) 4 MG TbDL Take 1 tablet (4 mg total) by mouth every 6 (six) hours as needed (nausea). (Patient not taking: No sig reported)    potassium chloride SA (K-DUR,KLOR-CON) 20 MEQ tablet TAKE 1 TABLET BY MOUTH ONCE DAILY    traMADoL (ULTRAM) 50 mg tablet Take 1 tablet (50 mg total) by mouth 2 (two) times daily as needed for Pain.    valsartan (DIOVAN) 80 MG tablet Take 1 tablet (80 mg total) by mouth once daily.    VELCADE 3.5 mg injection     zoledronic 4 mg/100 mL PgBk infusion    Last reviewed on 4/13/2022  7:12 PM by Adina Wright NP    Review of patient's allergies indicates:   Allergen Reactions    Lipitor [atorvastatin] Itching     Itching, elevated cpk, muscle aches, statin induced myositis    Last reviewed on  4/13/2022 7:12 PM by Adina Wright      Tasks added this encounter   No tasks added.   Tasks due within next 3 months   4/4/2022 - Refill Call (Auto Added)     Eric CabreraD  [facility]  [address] [place] [id number]  Phone: [phone]  Fax: [phone]

## 2022-04-18 ENCOUNTER — OFFICE VISIT (OUTPATIENT)
Dept: INTERNAL MEDICINE | Facility: CLINIC | Age: 72
End: 2022-04-18
Payer: MEDICARE

## 2022-04-18 ENCOUNTER — PATIENT MESSAGE (OUTPATIENT)
Dept: ADMINISTRATIVE | Facility: OTHER | Age: 72
End: 2022-04-18
Payer: MEDICARE

## 2022-04-18 VITALS
HEIGHT: 63 IN | WEIGHT: 150.81 LBS | DIASTOLIC BLOOD PRESSURE: 89 MMHG | HEART RATE: 75 BPM | OXYGEN SATURATION: 87 % | SYSTOLIC BLOOD PRESSURE: 134 MMHG | BODY MASS INDEX: 26.72 KG/M2

## 2022-04-18 DIAGNOSIS — Z79.4 TYPE 2 DIABETES MELLITUS WITH DIABETIC PERIPHERAL ANGIOPATHY WITHOUT GANGRENE, WITH LONG-TERM CURRENT USE OF INSULIN: ICD-10-CM

## 2022-04-18 DIAGNOSIS — E11.51 TYPE 2 DIABETES MELLITUS WITH DIABETIC PERIPHERAL ANGIOPATHY WITHOUT GANGRENE, WITH LONG-TERM CURRENT USE OF INSULIN: ICD-10-CM

## 2022-04-18 DIAGNOSIS — C90.01 MULTIPLE MYELOMA IN REMISSION: ICD-10-CM

## 2022-04-18 DIAGNOSIS — I27.9 PULMONARY HEART DISEASE: ICD-10-CM

## 2022-04-18 DIAGNOSIS — E11.9 TYPE 2 DIABETES MELLITUS WITHOUT COMPLICATION, WITHOUT LONG-TERM CURRENT USE OF INSULIN: Primary | ICD-10-CM

## 2022-04-18 DIAGNOSIS — C90.02 MULTIPLE MYELOMA IN RELAPSE: ICD-10-CM

## 2022-04-18 DIAGNOSIS — C90.00 METASTATIC MULTIPLE MYELOMA TO BONE: ICD-10-CM

## 2022-04-18 DIAGNOSIS — R09.02 HYPOXIA: ICD-10-CM

## 2022-04-18 DIAGNOSIS — Z94.84 HISTORY OF AUTO STEM CELL TRANSPLANT: ICD-10-CM

## 2022-04-18 DIAGNOSIS — D69.6 THROMBOCYTOPENIA, UNSPECIFIED: ICD-10-CM

## 2022-04-18 DIAGNOSIS — D63.0 ANEMIA IN NEOPLASTIC DISEASE: ICD-10-CM

## 2022-04-18 DIAGNOSIS — I71.20 THORACIC AORTIC ANEURYSM WITHOUT RUPTURE: ICD-10-CM

## 2022-04-18 DIAGNOSIS — J44.9 CHRONIC OBSTRUCTIVE PULMONARY DISEASE, UNSPECIFIED COPD TYPE: ICD-10-CM

## 2022-04-18 PROCEDURE — 1126F PR PAIN SEVERITY QUANTIFIED, NO PAIN PRESENT: ICD-10-PCS | Mod: CPTII,S$GLB,, | Performed by: INTERNAL MEDICINE

## 2022-04-18 PROCEDURE — 99999 PR PBB SHADOW E&M-EST. PATIENT-LVL V: CPT | Mod: PBBFAC,,, | Performed by: INTERNAL MEDICINE

## 2022-04-18 PROCEDURE — 1101F PR PT FALLS ASSESS DOC 0-1 FALLS W/OUT INJ PAST YR: ICD-10-PCS | Mod: CPTII,S$GLB,, | Performed by: INTERNAL MEDICINE

## 2022-04-18 PROCEDURE — 3288F FALL RISK ASSESSMENT DOCD: CPT | Mod: CPTII,S$GLB,, | Performed by: INTERNAL MEDICINE

## 2022-04-18 PROCEDURE — 3079F DIAST BP 80-89 MM HG: CPT | Mod: CPTII,S$GLB,, | Performed by: INTERNAL MEDICINE

## 2022-04-18 PROCEDURE — 3008F BODY MASS INDEX DOCD: CPT | Mod: CPTII,S$GLB,, | Performed by: INTERNAL MEDICINE

## 2022-04-18 PROCEDURE — 4010F PR ACE/ARB THEARPY RXD/TAKEN: ICD-10-PCS | Mod: CPTII,S$GLB,, | Performed by: INTERNAL MEDICINE

## 2022-04-18 PROCEDURE — 3075F PR MOST RECENT SYSTOLIC BLOOD PRESS GE 130-139MM HG: ICD-10-PCS | Mod: CPTII,S$GLB,, | Performed by: INTERNAL MEDICINE

## 2022-04-18 PROCEDURE — 1159F MED LIST DOCD IN RCRD: CPT | Mod: CPTII,S$GLB,, | Performed by: INTERNAL MEDICINE

## 2022-04-18 PROCEDURE — 3075F SYST BP GE 130 - 139MM HG: CPT | Mod: CPTII,S$GLB,, | Performed by: INTERNAL MEDICINE

## 2022-04-18 PROCEDURE — 99499 RISK ADDL DX/OHS AUDIT: ICD-10-PCS | Mod: S$GLB,,, | Performed by: INTERNAL MEDICINE

## 2022-04-18 PROCEDURE — 3044F PR MOST RECENT HEMOGLOBIN A1C LEVEL <7.0%: ICD-10-PCS | Mod: CPTII,S$GLB,, | Performed by: INTERNAL MEDICINE

## 2022-04-18 PROCEDURE — 99214 OFFICE O/P EST MOD 30 MIN: CPT | Mod: S$GLB,,, | Performed by: INTERNAL MEDICINE

## 2022-04-18 PROCEDURE — 3044F HG A1C LEVEL LT 7.0%: CPT | Mod: CPTII,S$GLB,, | Performed by: INTERNAL MEDICINE

## 2022-04-18 PROCEDURE — 3008F PR BODY MASS INDEX (BMI) DOCUMENTED: ICD-10-PCS | Mod: CPTII,S$GLB,, | Performed by: INTERNAL MEDICINE

## 2022-04-18 PROCEDURE — 99214 PR OFFICE/OUTPT VISIT, EST, LEVL IV, 30-39 MIN: ICD-10-PCS | Mod: S$GLB,,, | Performed by: INTERNAL MEDICINE

## 2022-04-18 PROCEDURE — 3079F PR MOST RECENT DIASTOLIC BLOOD PRESSURE 80-89 MM HG: ICD-10-PCS | Mod: CPTII,S$GLB,, | Performed by: INTERNAL MEDICINE

## 2022-04-18 PROCEDURE — 99499 UNLISTED E&M SERVICE: CPT | Mod: S$GLB,,, | Performed by: INTERNAL MEDICINE

## 2022-04-18 PROCEDURE — 99999 PR PBB SHADOW E&M-EST. PATIENT-LVL V: ICD-10-PCS | Mod: PBBFAC,,, | Performed by: INTERNAL MEDICINE

## 2022-04-18 PROCEDURE — 1159F PR MEDICATION LIST DOCUMENTED IN MEDICAL RECORD: ICD-10-PCS | Mod: CPTII,S$GLB,, | Performed by: INTERNAL MEDICINE

## 2022-04-18 PROCEDURE — 4010F ACE/ARB THERAPY RXD/TAKEN: CPT | Mod: CPTII,S$GLB,, | Performed by: INTERNAL MEDICINE

## 2022-04-18 PROCEDURE — 3288F PR FALLS RISK ASSESSMENT DOCUMENTED: ICD-10-PCS | Mod: CPTII,S$GLB,, | Performed by: INTERNAL MEDICINE

## 2022-04-18 PROCEDURE — 1126F AMNT PAIN NOTED NONE PRSNT: CPT | Mod: CPTII,S$GLB,, | Performed by: INTERNAL MEDICINE

## 2022-04-18 PROCEDURE — 1101F PT FALLS ASSESS-DOCD LE1/YR: CPT | Mod: CPTII,S$GLB,, | Performed by: INTERNAL MEDICINE

## 2022-04-18 NOTE — PROGRESS NOTES
This note was generated with [employer] voice recognition software. I apologize for any possible typographical errors.      Subjective:       Patient ID:  Shelby is a 72 y.o. female being seen for follow up 6 weeks.       Chief Complaint: Follow-up-- 6 weeks.      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  She achieved and tolerated well VRd x completing 7, 28 day cycles and achieving deep VGPR to induction. Then underwent Melphalan autologous stem cell transplant on 2/12/2020.          70 yo F with multiple myeloma (follows with Heme/Onc, s/p stem cell transplant Feb 2020), T2DM, thrombocytopenia, depression, drug induced neuropathy, chronic diastolic heart failure, hx of stroke with hemiplegia on right side, HLD, anemia, colon polyps who presents for follow up. She is accompanied by her friend.     Last visit we addressed DM, weight loss and htn.       Fatigue: less fatigue than before, usually fatigued after chemo  Increased appetite from before, eats 3 meals per day, consuming ice cream about 3x/week per Dr. Arevalo's advice from last visit. Sometimes will eat only 2 meals if she is sleeping.  Weight loss/gain: 3 lb loss since last visit-- She is eating well.         Cough/sore throat: resolved, no dyspnea. She has not used her oxygent very much and wants to call and have them pick it up  Pulse ox on RA was 87.  Can walk without getting short of breath.  Uses walker to get around at the house, sometimes goes out without walker. Ambulating well.      Wt Readings from Last 10 Encounters:  4/18/2022 150 lbs.    03/08/22 : 69.5 kg (153 lb 3.5 oz)  02/17/22 : 68.6 kg (151 lb 2 oz)  01/27/22 : 69.1 kg (152 lb 5.4 oz)  01/20/22 : 69.1 kg (152 lb 5.4 oz)  01/20/22 :  69.1 kg (152 lb 5.4 oz)  12/17/21 : 72 kg (158 lb 11.7 oz)  12/16/21 : 72 kg (158 lb 9.9 oz)  12/06/21 : 81.2 kg (179 lb)  12/03/21 : 77.4 kg (170 lb 10.2 oz)        Chemo every Thursday for multiple myeloma for the past year. No N/V. Usually tired after chemo on thursdays.    Right hand twitching, chronic.   Still able to cook.  helps with med administration and keeping track of blood sugars.   Elevates feet for leg swelling, uses compression stockings.      Anemia: most recent Hgb 10.4  HTN: home /80's   DM: most recent A1c 5.7 . On Metformin 1000 BID. Blood sugar at home 102 on Feb 28, left blood sugar log at home.   HLD with Statin Intolerance: on praluent, PCSK9 inhibitor     Care gaps:   Shingles vaccine: never taken, wants to defer to Dr. Rhodes to make sure it's okay with her chemo              Review of Systems   Constitutional: Negative for fever.   HENT: Negative for sore throat.    Eyes: Negative for blurred vision.   Respiratory: Negative for cough and shortness of breath.    Cardiovascular: Positive for leg swelling. Negative for chest pain.   Gastrointestinal: Negative for abdominal pain, constipation, diarrhea, nausea and vomiting.   Genitourinary: Negative for dysuria.   Musculoskeletal: Positive for myalgias.   Neurological: Negative for headaches.   Psychiatric/Behavioral: Negative for depression.            Patient Active Problem List   Diagnosis    Personal history of malignant neoplasm of breast    Mononeuritis of upper limb    Hyperlipemia    Gastropathy    Tremor    Nuclear sclerosis    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    Thoracic aortic aneurysm without rupture    Aortic atherosclerosis    History of CVA with residual deficit    Osteopenia    PAD (peripheral artery disease)    Liver mass    Recurrent major depressive disorder, in full remission    Statin intolerance    Dysarthria as late effect of cerebrovascular accident  "(CVA)    Pulmonary heart disease    Ectatic aorta    Tortuous aorta    Acute respiratory failure with hypoxia    Budd-Chiari syndrome    PVC (premature ventricular contraction)    Multiple myeloma in relapse    Metastatic multiple myeloma to bone    Status post autologous bone marrow transplant    Abnormal PFT    Infection due to human metapneumovirus (hMPV)    Iron deficiency anemia due to chronic blood loss    History of colon polyps    Drug-induced polyneuropathy    Essential hypertension    Type 2 diabetes mellitus without complication, without long-term current use of insulin    Multiple myeloma in remission    Anemia in neoplastic disease    Hemiplegia and hemiparesis following cerebral infarction affecting right dominant side    Abnormal findings on diagnostic imaging of liver and biliary tract     Type 2 diabetes mellitus with diabetic peripheral angiopathy without gangrene, with long-term current use of insulin    History of auto stem cell transplant    Community acquired pneumonia of right lower lobe of lung    Thrombocytopenia, unspecified    Chronic obstructive pulmonary disease, unspecified COPD type    Mild neurocognitive disorder due to multiple etiologies             Objective:      /89 (BP Location: Left arm, Patient Position: Sitting, BP Method: Medium (Manual))   Pulse 75   Ht 5' 3" (1.6 m)   Wt 68.4 kg (150 lb 12.7 oz)   LMP  (LMP Unknown)   SpO2 (!) 87%   BMI 26.71 kg/m²   Physical Exam  Constitutional:       Appearance: Normal appearance.      Comments: Pleasant, in good spirits   HENT:      Head: Normocephalic and atraumatic.      Nose: Nose normal.      Mouth/Throat:      Mouth: Mucous membranes are moist.   Eyes:      General: No scleral icterus.     Conjunctiva/sclera: Conjunctivae normal.   Cardiovascular:      Rate and Rhythm: Normal rate and regular rhythm.      Pulses: Normal pulses.      Heart sounds: Normal heart sounds. No murmur " heard.  Pulmonary:      Effort: Pulmonary effort is normal. No respiratory distress.      Breath sounds: Normal breath sounds. No wheezing or rales.   Abdominal:      General: Abdomen is flat. There is no distension.      Palpations: Abdomen is soft.      Tenderness: There is no abdominal tenderness. There is no guarding.   Musculoskeletal:         General: No swelling, tenderness or deformity.      Cervical back: Normal range of motion. No rigidity.      Right lower leg: No edema.      Left lower leg: No edema.    Protective Sensation (w/ 10 gram monofilament):  Right: Decreased  Left: Decreased    Visual Inspection:  Normal -  Bilateral    Pedal Pulses:   Right: Present  Left: Present    Posterior tibialis:   Right:Present  Left: Present      Skin:     General: Skin is warm and dry.      Coloration: Skin is not jaundiced.   Neurological:      General: No focal deficit present.      Mental Status: She is alert and oriented to person, place, and time.   Psychiatric:         Mood and Affect: Mood normal.         Behavior: Behavior normal.             Assessment and Plan:             Type 2 diabetes mellitus without complication, without long-term current use of insulin    Multiple myeloma in relapse    Metastatic multiple myeloma to bone    Pulmonary heart disease    Type 2 diabetes mellitus with diabetic peripheral angiopathy without gangrene, with long-term current use of insulin    Thrombocytopenia, unspecified    Multiple myeloma in remission    Anemia in neoplastic disease    History of auto stem cell transplant    Chronic obstructive pulmonary disease, unspecified COPD type    From CT on 8/2/2021:  Lungs/Pleura: Mild geographic attenuation of the lungs which can be seen with elevated pulmonary vascular resistance or small airways disease.  New right lower lobe subsegmental peribronchial thickening, consolidative and ground-glass opacification and associated thin-walled cysts and tubular bronchiectasis not  definitely seen on PET-CT 07/02/2021.  Emphysematous changes predominantly upper lung zones.       Stable appearance of saccular aneurysmal dilatation at the distal thoracic aorta near the level of diaphragm with associated mural thrombus not significantly changed.      Plan:  I will recommend for her to stay on oxygen 247 since her pulse ox is 87% today but she denies any shortness of breath.  She has COPD with a lot of emphysematous changes of her lungs and she has pulmonary heart disease.  I believe the oxygen would be beneficial to work to decrease her risk of right heart failure.    On recheck it CT of her lungs since her CT was abnormal last time and they recommended a follow-up.  Lungs sound clear today.  She seems to be talking breathing easy her in overall she looks better than I saw her last time.  Also we can recheck that saccular aneurysm she has in her thoracic aorta.    Her weight is stabilizing he with hit running 150-153 lb.  We are going to continue to monitor Master may sure she is eating well.  Her glucose is doing much better since cutting back on her steroids for her chemo.  If we need to add steroids to her chemo we can alter her diabetes treatment so that this is more controllable.  I will see her back again in 3 months or sooner if needed.

## 2022-04-21 ENCOUNTER — INFUSION (OUTPATIENT)
Dept: INFUSION THERAPY | Facility: HOSPITAL | Age: 72
End: 2022-04-21
Payer: MEDICARE

## 2022-04-21 VITALS
HEART RATE: 56 BPM | RESPIRATION RATE: 16 BRPM | DIASTOLIC BLOOD PRESSURE: 56 MMHG | SYSTOLIC BLOOD PRESSURE: 141 MMHG | TEMPERATURE: 98 F

## 2022-04-21 DIAGNOSIS — C90.01 MULTIPLE MYELOMA IN REMISSION: Primary | ICD-10-CM

## 2022-04-21 DIAGNOSIS — C90.02 MULTIPLE MYELOMA IN RELAPSE: ICD-10-CM

## 2022-04-21 PROCEDURE — 96401 CHEMO ANTI-NEOPL SQ/IM: CPT

## 2022-04-21 PROCEDURE — 63600175 PHARM REV CODE 636 W HCPCS: Mod: JG | Performed by: INTERNAL MEDICINE

## 2022-04-21 RX ORDER — BORTEZOMIB 3.5 MG/1
0.7 INJECTION, POWDER, LYOPHILIZED, FOR SOLUTION INTRAVENOUS; SUBCUTANEOUS
Status: COMPLETED | OUTPATIENT
Start: 2022-04-21 | End: 2022-04-21

## 2022-04-21 RX ADMIN — BORTEZOMIB 1.2 MG: 3.5 INJECTION, POWDER, LYOPHILIZED, FOR SOLUTION INTRAVENOUS; SUBCUTANEOUS at 11:04

## 2022-04-25 ENCOUNTER — HOSPITAL ENCOUNTER (OUTPATIENT)
Dept: RADIOLOGY | Facility: HOSPITAL | Age: 72
Discharge: HOME | End: 2022-04-25
Attending: INTERNAL MEDICINE
Payer: MEDICARE

## 2022-04-25 ENCOUNTER — DOCUMENT SCAN (OUTPATIENT)
Dept: HOME HEALTH SERVICES | Facility: HOSPITAL | Age: 72
End: 2022-04-25
Payer: MEDICARE

## 2022-04-25 DIAGNOSIS — R09.02 HYPOXIA: ICD-10-CM

## 2022-04-25 PROCEDURE — 71250 CT THORAX DX C-: CPT | Mod: 26,,, | Performed by: RADIOLOGY

## 2022-04-25 PROCEDURE — 71250 CT CHEST WITHOUT CONTRAST: ICD-10-PCS | Mod: 26,,, | Performed by: RADIOLOGY

## 2022-04-25 PROCEDURE — 71250 CT THORAX DX C-: CPT | Mod: TC

## 2022-04-28 ENCOUNTER — INFUSION (OUTPATIENT)
Dept: INFUSION THERAPY | Facility: HOSPITAL | Age: 72
End: 2022-04-28
Payer: MEDICARE

## 2022-04-28 VITALS
RESPIRATION RATE: 16 BRPM | HEART RATE: 64 BPM | DIASTOLIC BLOOD PRESSURE: 69 MMHG | SYSTOLIC BLOOD PRESSURE: 157 MMHG | TEMPERATURE: 98 F

## 2022-04-28 DIAGNOSIS — C90.02 MULTIPLE MYELOMA IN RELAPSE: ICD-10-CM

## 2022-04-28 DIAGNOSIS — C90.01 MULTIPLE MYELOMA IN REMISSION: Primary | ICD-10-CM

## 2022-04-28 PROCEDURE — 96401 CHEMO ANTI-NEOPL SQ/IM: CPT

## 2022-04-28 PROCEDURE — 63600175 PHARM REV CODE 636 W HCPCS: Mod: JG | Performed by: INTERNAL MEDICINE

## 2022-04-28 RX ORDER — BORTEZOMIB 3.5 MG/1
0.7 INJECTION, POWDER, LYOPHILIZED, FOR SOLUTION INTRAVENOUS; SUBCUTANEOUS
Status: COMPLETED | OUTPATIENT
Start: 2022-04-28 | End: 2022-04-28

## 2022-04-28 RX ADMIN — BORTEZOMIB 1.2 MG: 3.5 INJECTION, POWDER, LYOPHILIZED, FOR SOLUTION INTRAVENOUS; SUBCUTANEOUS at 10:04

## 2022-04-29 ENCOUNTER — TELEPHONE (OUTPATIENT)
Dept: INTERNAL MEDICINE | Facility: CLINIC | Age: 72
End: 2022-04-29
Payer: MEDICARE

## 2022-04-29 DIAGNOSIS — I71.20 THORACIC AORTIC ANEURYSM WITHOUT RUPTURE: Primary | ICD-10-CM

## 2022-04-29 NOTE — TELEPHONE ENCOUNTER
Pt scheduled for vascular surgery. Also notified her of CT results and pt verbalized understanding.     Mary LEBLANC.

## 2022-04-29 NOTE — TELEPHONE ENCOUNTER
Please call her.  Her CT of the chest looks better.  She does need to follow-up with vascular surgery for her thoracic aneurysm that they saw her for back in 2020.  I put a referral in so let us make sure she gets appointment to see them.

## 2022-05-05 ENCOUNTER — INFUSION (OUTPATIENT)
Dept: INFUSION THERAPY | Facility: HOSPITAL | Age: 72
End: 2022-05-05
Payer: MEDICARE

## 2022-05-05 VITALS
TEMPERATURE: 99 F | HEART RATE: 75 BPM | RESPIRATION RATE: 16 BRPM | DIASTOLIC BLOOD PRESSURE: 83 MMHG | SYSTOLIC BLOOD PRESSURE: 153 MMHG

## 2022-05-05 DIAGNOSIS — C90.02 MULTIPLE MYELOMA IN RELAPSE: ICD-10-CM

## 2022-05-05 DIAGNOSIS — C90.01 MULTIPLE MYELOMA IN REMISSION: Primary | ICD-10-CM

## 2022-05-05 PROCEDURE — 63600175 PHARM REV CODE 636 W HCPCS: Mod: JG | Performed by: INTERNAL MEDICINE

## 2022-05-05 PROCEDURE — 96401 CHEMO ANTI-NEOPL SQ/IM: CPT

## 2022-05-05 RX ORDER — DIPHENHYDRAMINE HYDROCHLORIDE 50 MG/ML
50 INJECTION INTRAMUSCULAR; INTRAVENOUS ONCE AS NEEDED
Status: DISCONTINUED | OUTPATIENT
Start: 2022-05-05 | End: 2022-05-05 | Stop reason: HOSPADM

## 2022-05-05 RX ORDER — BORTEZOMIB 3.5 MG/1
0.7 INJECTION, POWDER, LYOPHILIZED, FOR SOLUTION INTRAVENOUS; SUBCUTANEOUS
Status: COMPLETED | OUTPATIENT
Start: 2022-05-05 | End: 2022-05-05

## 2022-05-05 RX ORDER — EPINEPHRINE 0.3 MG/.3ML
0.3 INJECTION SUBCUTANEOUS ONCE AS NEEDED
Status: DISCONTINUED | OUTPATIENT
Start: 2022-05-05 | End: 2022-05-05 | Stop reason: HOSPADM

## 2022-05-05 RX ADMIN — BORTEZOMIB 1.2 MG: 3.5 INJECTION, POWDER, LYOPHILIZED, FOR SOLUTION INTRAVENOUS; SUBCUTANEOUS at 10:05

## 2022-05-12 ENCOUNTER — OFFICE VISIT (OUTPATIENT)
Dept: HEMATOLOGY/ONCOLOGY | Facility: CLINIC | Age: 72
End: 2022-05-12
Payer: MEDICARE

## 2022-05-12 ENCOUNTER — INFUSION (OUTPATIENT)
Dept: INFUSION THERAPY | Facility: HOSPITAL | Age: 72
End: 2022-05-12
Payer: MEDICARE

## 2022-05-12 VITALS
OXYGEN SATURATION: 98 % | HEART RATE: 65 BPM | WEIGHT: 148.5 LBS | BODY MASS INDEX: 26.31 KG/M2 | HEIGHT: 63 IN | RESPIRATION RATE: 16 BRPM | DIASTOLIC BLOOD PRESSURE: 76 MMHG | SYSTOLIC BLOOD PRESSURE: 143 MMHG

## 2022-05-12 VITALS
TEMPERATURE: 98 F | HEART RATE: 52 BPM | SYSTOLIC BLOOD PRESSURE: 156 MMHG | RESPIRATION RATE: 18 BRPM | DIASTOLIC BLOOD PRESSURE: 70 MMHG

## 2022-05-12 DIAGNOSIS — F41.9 ANXIETY AND DEPRESSION: ICD-10-CM

## 2022-05-12 DIAGNOSIS — Z94.84 HISTORY OF AUTOLOGOUS STEM CELL TRANSPLANT: ICD-10-CM

## 2022-05-12 DIAGNOSIS — D75.9 DRUG-INDUCED CYTOPENIA: ICD-10-CM

## 2022-05-12 DIAGNOSIS — G25.2 COARSE TREMORS: ICD-10-CM

## 2022-05-12 DIAGNOSIS — C80.1 NEUROPATHY ASSOCIATED WITH CANCER: ICD-10-CM

## 2022-05-12 DIAGNOSIS — R06.02 SHORTNESS OF BREATH: ICD-10-CM

## 2022-05-12 DIAGNOSIS — E78.00 PURE HYPERCHOLESTEROLEMIA: ICD-10-CM

## 2022-05-12 DIAGNOSIS — E11.9 TYPE 2 DIABETES MELLITUS WITHOUT COMPLICATION, WITHOUT LONG-TERM CURRENT USE OF INSULIN: ICD-10-CM

## 2022-05-12 DIAGNOSIS — T50.905A DRUG-INDUCED CYTOPENIA: ICD-10-CM

## 2022-05-12 DIAGNOSIS — F32.A ANXIETY AND DEPRESSION: ICD-10-CM

## 2022-05-12 DIAGNOSIS — C90.00 MULTIPLE MYELOMA, REMISSION STATUS UNSPECIFIED: Primary | ICD-10-CM

## 2022-05-12 DIAGNOSIS — C90.02 MULTIPLE MYELOMA IN RELAPSE: ICD-10-CM

## 2022-05-12 DIAGNOSIS — Z85.3 HISTORY OF LEFT BREAST CANCER: ICD-10-CM

## 2022-05-12 DIAGNOSIS — I69.30 HISTORY OF CVA WITH RESIDUAL DEFICIT: ICD-10-CM

## 2022-05-12 DIAGNOSIS — G63 NEUROPATHY ASSOCIATED WITH CANCER: ICD-10-CM

## 2022-05-12 DIAGNOSIS — I10 ESSENTIAL HYPERTENSION: ICD-10-CM

## 2022-05-12 DIAGNOSIS — C90.01 MULTIPLE MYELOMA IN REMISSION: Primary | ICD-10-CM

## 2022-05-12 PROCEDURE — 1160F PR REVIEW ALL MEDS BY PRESCRIBER/CLIN PHARMACIST DOCUMENTED: ICD-10-PCS | Mod: CPTII,S$GLB,, | Performed by: NURSE PRACTITIONER

## 2022-05-12 PROCEDURE — 3044F PR MOST RECENT HEMOGLOBIN A1C LEVEL <7.0%: ICD-10-PCS | Mod: CPTII,S$GLB,, | Performed by: NURSE PRACTITIONER

## 2022-05-12 PROCEDURE — 3008F PR BODY MASS INDEX (BMI) DOCUMENTED: ICD-10-PCS | Mod: CPTII,S$GLB,, | Performed by: NURSE PRACTITIONER

## 2022-05-12 PROCEDURE — 3288F PR FALLS RISK ASSESSMENT DOCUMENTED: ICD-10-PCS | Mod: CPTII,S$GLB,, | Performed by: NURSE PRACTITIONER

## 2022-05-12 PROCEDURE — 1101F PT FALLS ASSESS-DOCD LE1/YR: CPT | Mod: CPTII,S$GLB,, | Performed by: NURSE PRACTITIONER

## 2022-05-12 PROCEDURE — 4010F PR ACE/ARB THEARPY RXD/TAKEN: ICD-10-PCS | Mod: CPTII,S$GLB,, | Performed by: NURSE PRACTITIONER

## 2022-05-12 PROCEDURE — 1126F AMNT PAIN NOTED NONE PRSNT: CPT | Mod: CPTII,S$GLB,, | Performed by: NURSE PRACTITIONER

## 2022-05-12 PROCEDURE — 99999 PR PBB SHADOW E&M-EST. PATIENT-LVL V: ICD-10-PCS | Mod: PBBFAC,,, | Performed by: NURSE PRACTITIONER

## 2022-05-12 PROCEDURE — 3044F HG A1C LEVEL LT 7.0%: CPT | Mod: CPTII,S$GLB,, | Performed by: NURSE PRACTITIONER

## 2022-05-12 PROCEDURE — 63600175 PHARM REV CODE 636 W HCPCS: Mod: JG | Performed by: INTERNAL MEDICINE

## 2022-05-12 PROCEDURE — 99999 PR PBB SHADOW E&M-EST. PATIENT-LVL V: CPT | Mod: PBBFAC,,, | Performed by: NURSE PRACTITIONER

## 2022-05-12 PROCEDURE — 99215 OFFICE O/P EST HI 40 MIN: CPT | Mod: S$GLB,,, | Performed by: NURSE PRACTITIONER

## 2022-05-12 PROCEDURE — 1126F PR PAIN SEVERITY QUANTIFIED, NO PAIN PRESENT: ICD-10-PCS | Mod: CPTII,S$GLB,, | Performed by: NURSE PRACTITIONER

## 2022-05-12 PROCEDURE — 3008F BODY MASS INDEX DOCD: CPT | Mod: CPTII,S$GLB,, | Performed by: NURSE PRACTITIONER

## 2022-05-12 PROCEDURE — 99215 PR OFFICE/OUTPT VISIT, EST, LEVL V, 40-54 MIN: ICD-10-PCS | Mod: S$GLB,,, | Performed by: NURSE PRACTITIONER

## 2022-05-12 PROCEDURE — 3078F DIAST BP <80 MM HG: CPT | Mod: CPTII,S$GLB,, | Performed by: NURSE PRACTITIONER

## 2022-05-12 PROCEDURE — 1159F MED LIST DOCD IN RCRD: CPT | Mod: CPTII,S$GLB,, | Performed by: NURSE PRACTITIONER

## 2022-05-12 PROCEDURE — 3078F PR MOST RECENT DIASTOLIC BLOOD PRESSURE < 80 MM HG: ICD-10-PCS | Mod: CPTII,S$GLB,, | Performed by: NURSE PRACTITIONER

## 2022-05-12 PROCEDURE — 1101F PR PT FALLS ASSESS DOC 0-1 FALLS W/OUT INJ PAST YR: ICD-10-PCS | Mod: CPTII,S$GLB,, | Performed by: NURSE PRACTITIONER

## 2022-05-12 PROCEDURE — 96401 CHEMO ANTI-NEOPL SQ/IM: CPT

## 2022-05-12 PROCEDURE — 25000003 PHARM REV CODE 250: Performed by: INTERNAL MEDICINE

## 2022-05-12 PROCEDURE — 4010F ACE/ARB THERAPY RXD/TAKEN: CPT | Mod: CPTII,S$GLB,, | Performed by: NURSE PRACTITIONER

## 2022-05-12 PROCEDURE — 3288F FALL RISK ASSESSMENT DOCD: CPT | Mod: CPTII,S$GLB,, | Performed by: NURSE PRACTITIONER

## 2022-05-12 PROCEDURE — 1159F PR MEDICATION LIST DOCUMENTED IN MEDICAL RECORD: ICD-10-PCS | Mod: CPTII,S$GLB,, | Performed by: NURSE PRACTITIONER

## 2022-05-12 PROCEDURE — 3077F SYST BP >= 140 MM HG: CPT | Mod: CPTII,S$GLB,, | Performed by: NURSE PRACTITIONER

## 2022-05-12 PROCEDURE — 3077F PR MOST RECENT SYSTOLIC BLOOD PRESSURE >= 140 MM HG: ICD-10-PCS | Mod: CPTII,S$GLB,, | Performed by: NURSE PRACTITIONER

## 2022-05-12 PROCEDURE — 1160F RVW MEDS BY RX/DR IN RCRD: CPT | Mod: CPTII,S$GLB,, | Performed by: NURSE PRACTITIONER

## 2022-05-12 RX ORDER — ACETAMINOPHEN 325 MG/1
650 TABLET ORAL
Status: COMPLETED | OUTPATIENT
Start: 2022-05-12 | End: 2022-05-12

## 2022-05-12 RX ORDER — EPINEPHRINE 0.3 MG/.3ML
0.3 INJECTION SUBCUTANEOUS ONCE AS NEEDED
Status: DISCONTINUED | OUTPATIENT
Start: 2022-05-12 | End: 2022-05-12 | Stop reason: HOSPADM

## 2022-05-12 RX ORDER — DIPHENHYDRAMINE HYDROCHLORIDE 50 MG/ML
50 INJECTION INTRAMUSCULAR; INTRAVENOUS ONCE AS NEEDED
Status: CANCELLED | OUTPATIENT
Start: 2022-05-12

## 2022-05-12 RX ORDER — BORTEZOMIB 3.5 MG/1
0.7 INJECTION, POWDER, LYOPHILIZED, FOR SOLUTION INTRAVENOUS; SUBCUTANEOUS
Status: COMPLETED | OUTPATIENT
Start: 2022-05-12 | End: 2022-05-12

## 2022-05-12 RX ORDER — DIPHENHYDRAMINE HCL 25 MG
25 CAPSULE ORAL
Status: CANCELLED | OUTPATIENT
Start: 2022-05-12

## 2022-05-12 RX ORDER — BORTEZOMIB 3.5 MG/1
0.7 INJECTION, POWDER, LYOPHILIZED, FOR SOLUTION INTRAVENOUS; SUBCUTANEOUS
Status: CANCELLED | OUTPATIENT
Start: 2022-05-12

## 2022-05-12 RX ORDER — EPINEPHRINE 0.3 MG/.3ML
0.3 INJECTION SUBCUTANEOUS ONCE AS NEEDED
Status: CANCELLED | OUTPATIENT
Start: 2022-05-12

## 2022-05-12 RX ORDER — DIPHENHYDRAMINE HCL 25 MG
25 CAPSULE ORAL
Status: COMPLETED | OUTPATIENT
Start: 2022-05-12 | End: 2022-05-12

## 2022-05-12 RX ORDER — ACETAMINOPHEN 325 MG/1
650 TABLET ORAL
Status: CANCELLED | OUTPATIENT
Start: 2022-05-12

## 2022-05-12 RX ORDER — DIPHENHYDRAMINE HYDROCHLORIDE 50 MG/ML
50 INJECTION INTRAMUSCULAR; INTRAVENOUS ONCE AS NEEDED
Status: DISCONTINUED | OUTPATIENT
Start: 2022-05-12 | End: 2022-05-12 | Stop reason: HOSPADM

## 2022-05-12 RX ADMIN — ACETAMINOPHEN 650 MG: 325 TABLET ORAL at 01:05

## 2022-05-12 RX ADMIN — DARATUMUMAB AND HYALURONIDASE-FIHJ (HUMAN RECOMBINANT) 1800 MG: 1800; 30000 INJECTION SUBCUTANEOUS at 02:05

## 2022-05-12 RX ADMIN — DIPHENHYDRAMINE HYDROCHLORIDE 25 MG: 25 CAPSULE ORAL at 01:05

## 2022-05-12 RX ADMIN — BORTEZOMIB 1.2 MG: 3.5 INJECTION, POWDER, LYOPHILIZED, FOR SOLUTION INTRAVENOUS; SUBCUTANEOUS at 02:05

## 2022-05-12 NOTE — PLAN OF CARE
Sravanthi sq velcade administered sq to abdomen. Pt tolerated well. Vitals stable. Steroid d/c after 4th cycle. Pt d/c home ambulated away from unit independently accompanied by friend NAD.

## 2022-05-12 NOTE — Clinical Note
please schedule velcade on 05/19, 05/26, 06/02, Schedule kenia, velcade on 6/09. cbc, cmp, myeloma studies,MD visit on 06/09

## 2022-05-12 NOTE — PROGRESS NOTES
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  She achieved and tolerated well VRd x completing 7, 28 day cycles and achieving deep VGPR to induction. Then underwent Melphalan autologous stem cell transplant on 2/12/2020.      Extensive PMH as below.    2 prior CVAs (one in 2008, one in 2011)  L breast cancer DCIS stage 0 (s/p lumpectomy and radiation, no endocrine tx due to CVA)  HTN  DM II  Neuropathy, b/l hands  Prior smoker, quit in 2011  Hepatic lesions - vascular per recent MRI in 09 /2019 with no changes; will confirm no further rascon needed from help  Statin Intolerance - on praluent, PCSK9 inhibitor  HFpEF - EF 55%, diastolic dysfunction  Anxiety/Depression     ADMISSION SUMMARY: 2/10-2/26/2020  Admitted 2/10, tolerated chemo and transplant well. Engrafted on day +12. Remained afebrile throughout hospital stay and no mucositis. Experienced expected side effects of nausea and diarrhea controlled with antiemetics and Imodium. Discharged home with home PT/OT       Subjective:       Initial History: Ms. Thompson is a 72 y.o. female who returns for follow up of multiple myeloma in remission. She is 2 year 2 month post AutoSCT.   On kenia/zhanna/dex after biochemical relapse noted approximately 1 year post transplant. Tolerating and responding well. post-stroke cognitive dysfunction, but with progressive decline over the past several years. Her  had recent surgery, so limited help with house work.. Her friend assist her for transportation and appointments. Following by neurologist for cognitive/memory decline.   Fingertip  neuropathy stable.Accompanied by her friend.    Today is C11D1 kenia/zhanna    Otherwise no signs symptoms of kenia/zhanna intolerance.     Past Medical History:   Past Medical History:   Diagnosis Date    Acute respiratory failure with hypoxia 4/30/2019    FUENTES (acute kidney injury) 5/1/2019    Allergy     Anemia     Aortic aneurysm     Breast cancer 10/2011    left breast Stage 0 DCIS    Cataract     Chronic diastolic congestive heart failure 11/6/2015    Colon polyp     Community acquired pneumonia of right lower lobe of lung 8/3/2021    GERD (gastroesophageal reflux disease)     Hiatal hernia     History of colonic polyps     Hx of psychiatric care     Zoloft    HX: breast cancer     Hyperlipemia     Hypertension     ICH (intracerebral hemorrhage)     Major depressive disorder, single episode, mild 6/23/2016    Nuclear sclerosis 7/21/2014    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 7/21/2014    PAD (peripheral artery disease) 11/6/2015    Psychiatric problem     Statin-induced rhabdomyolysis     4/2015     Stroke 4/2011    Type 2 diabetes mellitus with diabetic peripheral angiopathy without gangrene, with long-term current use of insulin 3/31/2021    Type 2 diabetes mellitus without complication, without long-term current use of insulin 2/10/2020    Walker as ambulation aid        Past Surgical HIstory:   Past Surgical History:   Procedure Laterality Date    APPENDECTOMY      BONE MARROW BIOPSY Left 10/3/2019    Procedure: Biopsy-bone marrow;  Surgeon: Jere Tineo MD;  Location: [facility] OR [address] [place] [address];  Service: Oncology;  Laterality: Left;    BREAST BIOPSY Left 10/2011    BREAST LUMPECTOMY Left 2011    DCIS    COLONOSCOPY      COLONOSCOPY N/A 1/9/2020    Procedure: COLONOSCOPY;  Surgeon: Quang De La Cruz MD;  Location: [facility] (4TH FLR);  Service: Endoscopy;  Laterality: N/A;    CYST REMOVAL      back    ESOPHAGOGASTRODUODENOSCOPY  07/2016    duodenal  angioectasia    ESOPHAGOGASTRODUODENOSCOPY N/A 1/9/2020    Procedure: EGD (ESOPHAGOGASTRODUODENOSCOPY);  Surgeon: Quang De La Cruz MD;  Location: [facility] ([address] [place] [address]);  Service: Endoscopy;  Laterality: N/A;    FOOT FRACTURE SURGERY  10/2012    right foot, with R hallux valgus repair    FRACTURE SURGERY Right     broken toe repiar    HEMORRHOID SURGERY      LIVER BIOPSY  04/2015    essentially normal, no fibrosis    TUBAL LIGATION      UPPER GASTROINTESTINAL ENDOSCOPY         Family History:   Family History   Problem Relation Age of Onset    Dementia Sister         x1 sister    Cancer Sister 63        Lung Cancer    No Known Problems Mother     Cancer Father     No Known Problems Brother     Hypertension Daughter     Fibroids Daughter         uterine    Hypertension Son     No Known Problems Brother     No Known Problems Maternal Aunt     No Known Problems Maternal Uncle     No Known Problems Paternal Aunt     No Known Problems Paternal Uncle     Hypertension Maternal Grandmother     No Known Problems Maternal Grandfather     No Known Problems Paternal Grandmother     No Known Problems Paternal Grandfather     Ovarian cancer Neg Hx     Colon cancer Neg Hx     Tremor Neg Hx     Amblyopia Neg Hx     Blindness Neg Hx     Cataracts Neg Hx     Diabetes Neg Hx     Glaucoma Neg Hx     Macular degeneration Neg Hx     Retinal detachment Neg Hx     Strabismus Neg Hx     Stroke Neg Hx     Thyroid disease Neg Hx     Esophageal cancer Neg Hx     Rectal cancer Neg Hx     Stomach cancer Neg Hx     Ulcerative colitis Neg Hx     Crohn's disease Neg Hx     Irritable bowel syndrome Neg Hx     Celiac disease Neg Hx        Social History:  reports that she quit smoking about 11 years ago. Her smoking use included cigarettes. She started smoking about 55 years ago. She has a 11.00 pack-year smoking history. She has never used smokeless tobacco. She reports previous alcohol use. She reports that  she does not use drugs.    Allergies:  Review of patient's allergies indicates:   Allergen Reactions    Lipitor [atorvastatin] Itching     Itching, elevated cpk, muscle aches, statin induced myositis       Medications:  Current Outpatient Medications   Medication Sig Dispense Refill    acyclovir (ZOVIRAX) 400 MG tablet Take by mouth 2 (two) times daily.      albuterol (ACCUNEB) 1.25 mg/3 mL Nebu Take 3 mLs (1.25 mg total) by nebulization every 6 (six) hours as needed (wheeze/cough). Rescue 75 mL 2    albuterol (PROVENTIL HFA) 90 mcg/actuation inhaler Inhale 2 puffs into the lungs every 6 (six) hours as needed for Wheezing. Rescue 6.7 g 0    alirocumab (PRALUENT PEN) 75 mg/mL PnIj Inject 1 mL (75 mg total) into the skin every 14 (fourteen) days. 6 mL 3    amLODIPine (NORVASC) 5 MG tablet TAKE 1 TABLET BY MOUTH EVERY DAY 90 tablet 3    aspirin (ECOTRIN) 81 MG EC tablet Take 1 tablet (81 mg total) by mouth once daily. 90 tablet 3    benzonatate (TESSALON) 100 MG capsule Take 1 capsule (100 mg total) by mouth 3 (three) times daily as needed for Cough. 30 capsule 0    cholecalciferol, vitamin D3, 125 mcg (5,000 unit) Tab 1 tablet DAILY (route: oral)      ENGERIX-B, PF, 20 mcg/mL Syrg       gabapentin (NEURONTIN) 300 MG capsule Take 2 capsules (600 mg total) by mouth 2 (two) times daily. 180 capsule 2    guaiFENesin (MUCINEX) 600 mg 12 hr tablet Take 1 tablet (600 mg total) by mouth 2 (two) times daily. 30 tablet 0    metFORMIN (GLUCOPHAGE) 1000 MG tablet Take 1 tablet (1,000 mg total) by mouth 2 (two) times daily with meals. 180 tablet 3    omeprazole (PRILOSEC) 40 MG capsule TAKE 1 CAPSULE BY MOUTH EVERY DAY 90 capsule 3    ondansetron (ZOFRAN-ODT) 4 MG TbDL Take 1 tablet (4 mg total) by mouth every 6 (six) hours as needed (nausea). 12 tablet 0    potassium chloride SA (K-DUR,KLOR-CON) 20 MEQ tablet TAKE 1 TABLET BY MOUTH ONCE DAILY 90 tablet 3    traMADoL (ULTRAM) 50 mg tablet Take 1 tablet (50 mg  total) by mouth 2 (two) times daily as needed for Pain. 60 tablet 3    valsartan (DIOVAN) 80 MG tablet Take 1 tablet (80 mg total) by mouth once daily. 90 tablet 3    VELCADE 3.5 mg injection       zoledronic 4 mg/100 mL PgBk infusion        No current facility-administered medications for this visit.     Facility-Administered Medications Ordered in Other Visits   Medication Dose Route Frequency Provider Last Rate Last Admin    diphenhydrAMINE injection 50 mg  50 mg Intravenous Once PRN Jere Tineo MD        EPINEPHrine (EPIPEN) 0.3 mg/0.3 mL pen injection 0.3 mg  0.3 mg Intramuscular Once PRN Jere Tineo MD        hydrocortisone sodium succinate injection 100 mg  100 mg Intravenous Once PRN Jere Tineo MD           Review of Systems   Constitutional: Positive for fatigue. Negative for appetite change, chills and fever.   HENT: Negative for ear discharge, ear pain, nosebleeds, postnasal drip, rhinorrhea, sinus pressure and sore throat.    Eyes: Negative for pain.   Respiratory: Negative for chest tightness and shortness of breath.    Cardiovascular: Negative for chest pain, palpitations and leg swelling.   Gastrointestinal: Negative for abdominal distention, abdominal pain, blood in stool, constipation, diarrhea, nausea and vomiting.   Genitourinary: Negative.  Negative for dysuria and hematuria.   Musculoskeletal: Negative.  Negative for back pain, gait problem and myalgias.   Skin: Negative.    Neurological: Positive for tremors and numbness. Negative for syncope and headaches.   Hematological: Negative for adenopathy. Does not bruise/bleed easily.   Psychiatric/Behavioral: The patient is not nervous/anxious.         Memory issues       ECOG Performance Status: 2 (due to remote  CVA)   Objective:      Vitals:   Vitals:    05/12/22 1232   BP: (!) 143/76   Pulse: 65   Resp: 16        PE:   General -in wheelchair  no apparent distress  HEENT - oropharynx clear  Chest and Lung  -bilateral end expiratory wheezes  Cardiovascular - RRR with no MGR, normal S1 and S2  Abdomen-  soft, nontender, no palpable hepatomegaly or splenomegaly  Lymph - no palpable lymphadenopathy  Heme - no bruising, petechiae, pallor  Skin - no rashes or lesions  Psych - appropriate mood and affect  Neuro -  No change in chronic residual CVA symptoms    Laboratory Data:  Lab Visit on 05/12/2022   Component Date Value Ref Range Status    IgG 05/12/2022 1181  650 - 1600 mg/dL Final    IgG Cord Blood Reference Range: 650-1600 mg/dL.    IgA 05/12/2022 43  40 - 350 mg/dL Final    IgA Cord Blood Reference Range: <5 mg/dL.    IgM 05/12/2022 33 (A) 50 - 300 mg/dL Final    IgM Cord Blood Reference Range: <25 mg/dL.    Protein, Serum 05/12/2022 7.3  6.0 - 8.4 g/dL Final    Comment: Serum protein electrophoresis and immunofixation results should be   interpreted in clinical context in that some therapeutic agents can   result   in false positive results (example, daratumumab). Correlation with   the   patient s therapeutic regimen is required.      WBC 05/12/2022 3.14 (A) 3.90 - 12.70 K/uL Final    RBC 05/12/2022 4.32  4.00 - 5.40 M/uL Final    Hemoglobin 05/12/2022 10.7 (A) 12.0 - 16.0 g/dL Final    Hematocrit 05/12/2022 34.5 (A) 37.0 - 48.5 % Final    MCV 05/12/2022 80 (A) 82 - 98 fL Final    MCH 05/12/2022 24.8 (A) 27.0 - 31.0 pg Final    MCHC 05/12/2022 31.0 (A) 32.0 - 36.0 g/dL Final    RDW 05/12/2022 16.2 (A) 11.5 - 14.5 % Final    Platelets 05/12/2022 186  150 - 450 K/uL Final    MPV 05/12/2022 12.7  9.2 - 12.9 fL Final    Immature Granulocytes 05/12/2022 0.3  0.0 - 0.5 % Final    Gran # (ANC) 05/12/2022 1.7 (A) 1.8 - 7.7 K/uL Final    Immature Grans (Abs) 05/12/2022 0.01  0.00 - 0.04 K/uL Final    Comment: Mild elevation in immature granulocytes is non specific and   can be seen in a variety of conditions including stress response,   acute inflammation, trauma and pregnancy. Correlation with other    laboratory and clinical findings is essential.      Lymph # 05/12/2022 1.0  1.0 - 4.8 K/uL Final    Mono # 05/12/2022 0.4  0.3 - 1.0 K/uL Final    Eos # 05/12/2022 0.1  0.0 - 0.5 K/uL Final    Baso # 05/12/2022 0.01  0.00 - 0.20 K/uL Final    nRBC 05/12/2022 0  0 /100 WBC Final    Gran % 05/12/2022 52.9  38.0 - 73.0 % Final    Lymph % 05/12/2022 32.8  18.0 - 48.0 % Final    Mono % 05/12/2022 12.1  4.0 - 15.0 % Final    Eosinophil % 05/12/2022 1.6  0.0 - 8.0 % Final    Basophil % 05/12/2022 0.3  0.0 - 1.9 % Final    Differential Method 05/12/2022 Automated   Final    Sodium 05/12/2022 143  136 - 145 mmol/L Final    Potassium 05/12/2022 4.2  3.5 - 5.1 mmol/L Final    Chloride 05/12/2022 106  95 - 110 mmol/L Final    CO2 05/12/2022 28  23 - 29 mmol/L Final    Glucose 05/12/2022 87  70 - 110 mg/dL Final    BUN 05/12/2022 13  8 - 23 mg/dL Final    Creatinine 05/12/2022 0.8  0.5 - 1.4 mg/dL Final    Calcium 05/12/2022 9.7  8.7 - 10.5 mg/dL Final    Total Protein 05/12/2022 7.6  6.0 - 8.4 g/dL Final    Albumin 05/12/2022 4.2  3.5 - 5.2 g/dL Final    Total Bilirubin 05/12/2022 0.5  0.1 - 1.0 mg/dL Final    Comment: For infants and newborns, interpretation of results should be based  on gestational age, weight and in agreement with clinical  observations.    Premature Infant recommended reference ranges:  Up to 24 hours.............<8.0 mg/dL  Up to 48 hours............<12.0 mg/dL  3-5 days..................<15.0 mg/dL  6-29 days.................<15.0 mg/dL      Alkaline Phosphatase 05/12/2022 45 (A) 55 - 135 U/L Final    AST 05/12/2022 14  10 - 40 U/L Final    ALT 05/12/2022 7 (A) 10 - 44 U/L Final    Anion Gap 05/12/2022 9  8 - 16 mmol/L Final    eGFR if African American 05/12/2022 >60.0  >60 mL/min/1.73 m^2 Final    eGFR if non African American 05/12/2022 >60.0  >60 mL/min/1.73 m^2 Final    Comment: Calculation used to obtain the estimated glomerular filtration  rate (eGFR) is the CKD-EPI  equation.         Lab Results   Component Value Date    WBC 3.14 (L) 05/12/2022    HGB 10.7 (L) 05/12/2022    HCT 34.5 (L) 05/12/2022    MCV 80 (L) 05/12/2022     05/12/2022       Gran # (ANC)   Date Value Ref Range Status   05/12/2022 1.7 (L) 1.8 - 7.7 K/uL Final     Gran %   Date Value Ref Range Status   05/12/2022 52.9 38.0 - 73.0 % Final     Kappa Free Light Chains   Date Value Ref Range Status   04/13/2022 8.09 (H) 0.33 - 1.94 mg/dL Final   03/17/2022 6.42 (H) 0.33 - 1.94 mg/dL Final   02/17/2022 5.01 (H) 0.33 - 1.94 mg/dL Final     Lambda Free Light Chains   Date Value Ref Range Status   04/13/2022 1.24 0.57 - 2.63 mg/dL Final   03/17/2022 1.40 0.57 - 2.63 mg/dL Final   02/17/2022 1.63 0.57 - 2.63 mg/dL Final     Kappa/Lambda FLC Ratio   Date Value Ref Range Status   04/13/2022 6.52 (H) 0.26 - 1.65 Final     Comment:     Undetected antigen excess is a rare event but cannot   be excluded. If these free light chain results do not   agree with other clinical or laboratory findings or   if the sample is from a patient that has previously   demonstrated antigen excess, discuss with the testing   laboratory.   Results should always be interpreted in conjunction   with other laboratory tests and clinical evidence.     03/17/2022 4.59 (H) 0.26 - 1.65 Final     Comment:     Undetected antigen excess is a rare event but cannot   be excluded. If these free light chain results do not   agree with other clinical or laboratory findings or   if the sample is from a patient that has previously   demonstrated antigen excess, discuss with the testing   laboratory.   Results should always be interpreted in conjunction   with other laboratory tests and clinical evidence.     02/17/2022 3.07 (H) 0.26 - 1.65 Final     Comment:     Undetected antigen excess is a rare event but cannot   be excluded. If these free light chain results do not   agree with other clinical or laboratory findings or   if the sample is from a patient  that has previously   demonstrated antigen excess, discuss with the testing   laboratory.   Results should always be interpreted in conjunction   with other laboratory tests and clinical evidence.       IgG   Date Value Ref Range Status   05/12/2022 1181 650 - 1600 mg/dL Final     Comment:     IgG Cord Blood Reference Range: 650-1600 mg/dL.     IgA   Date Value Ref Range Status   05/12/2022 43 40 - 350 mg/dL Final     Comment:     IgA Cord Blood Reference Range: <5 mg/dL.     IgM   Date Value Ref Range Status   05/12/2022 33 (L) 50 - 300 mg/dL Final     Comment:     IgM Cord Blood Reference Range: <25 mg/dL.   Pathologist Interpretation SPE  Pathologist Interpretation SPE  Collected: 01/13/22 1121   Result status: Final   Resulting lab: [facility]   Value: REVIEWED   Comment:   Electronically reviewed and signed by:   Destiny Lopes M.D.   Signed on 01/18/22 at 14:00   Normal total protein.   Normal gamma globulins are decreased.   Two closely adjacent paraprotein bands:   1) Near-gamma = 0.22 g/dL (previously 0.19 g/dL).   2) Mid-gamma = 0.17 g/dL (previously, 0.15 g/dL).    *Additional information available - comment           Imaging:      Assessment:       Ms. Thompson is a 71 year old female with relapsed multiple myeloma.     Plan:     MM s/p Auto SCT  - high risk MM with 17p deletion  - 2 year 2 month  s/p Auto SCT  - started maintenance q2w velcade 5/6/20. Receiving C10 today  - serial biochemical relapse  Noted and  given this and high risk CG , transitioned to Sravanthi/Noble (1mg/m2) /dex  Salvage to which she is tolerating.  On weekly velcade, sravanthi weekly x8 >EOW x 8 doses, then q 4 weeks  - Due to uncontrolled hyperglycemia dex stopped after C4. Continue on sravanthi/noble until progression. Sravanthi q 4 weekly, weekly velcade.  velcade decreased to 1mg/m2 for grade 1 neuropathy which is stable.   -supportive meds:   continue ppx acyclovir, asa; resume maintenance zometa q 3 months for 1 year;  "last dose on 01/22 ,missed next dose. Will schedule with next visit.   - shows progression on recent studies. SPEP on 04/13-0.41g/dl,  03/17 - 0.37 g/dL, previously 0.24 g/dL. Today's level pending. If myeloma studies progressing will consider another line of therapy.  -post transplant vaccines completed     anemia due to chemotherapy  - stable mild  -due to therapy monitor      Neuropathy  -Stable   - continues gabapentin and prn tramadol  -dose reduced velcade further to 0.7mg/m2 moving forward (12/16/21)     SOB/descending aortic aneurysm  - CTA and dopplers ordered urgently with high concern for DVT/PE; completed 8/3/20  - incidental descending thoracic aortic aneurysm noted; referred to thoracic surgery; seen 8/7/20; per note: "at current size would not recommend any intervention as risk of rupture remains low.  Recommend surveillance CT chest every 12 months to monitor growth of the aneurysm.  Would typically recommend aspirin 81 mg daily in patients with aortic aneurysm  - ASA started (9/14/20)     HTN  - continue norvasc  - Patient to monitor BP closely  Anxiety/depression  - continue zoloft    DM2  - diet controlled    Hx of CVA  - s/p 2008, 2011    HLD with Statin Intolerance  -on praluent, PCSK9 inhibitor    L breast cancer DCIS stage 0  -s/p lumpectomy and radiation, no endocrine tx due to CVA    Coarse tremors/memory issues  -  Stable coarse tremors   - Short term memory issues worsened  - repeat MRI brain 12/16/21 Multifocal areas of remote parenchymal hemorrhage similar to prior exam, possibility of underlying cerebral amyloid angiopathy.  - Close f/u with neuro.       BMT Chart Routing      Follow up with physician 4 weeks. - Schedule with  for treatment decision   Follow up with CHRISTY    Labs CBC, CMP, immunoglobulins, SPEP and free light chains   Lab interval:  Please schedule velcade on 05/19, 05/26, 06/02, Schedule kenia, velcade on 6/09. cbc, cmp, myeloma studies,MD visit on " 06/09.    Imaging    Pharmacy appointment    Other referrals          Adina Wright NP  Hematology/Oncology/BMT

## 2022-05-13 ENCOUNTER — SPECIALTY PHARMACY (OUTPATIENT)
Dept: PHARMACY | Facility: CLINIC | Age: 72
End: 2022-05-13
Payer: MEDICARE

## 2022-05-13 NOTE — TELEPHONE ENCOUNTER
Specialty Pharmacy - Refill Coordination    Specialty Medication Orders Linked to Encounter    Flowsheet Row Most Recent Value   Medication #1 alirocumab (PRALUENT PEN) 75 mg/mL PnIj (Order#[id number], Rx#[id number])          Refill Questions - Documented Responses    Flowsheet Row Most Recent Value   Patient Availability and HIPAA Verification    Does patient want to proceed with activity? Yes   HIPAA/medical authority confirmed? Yes   Relationship to patient of person spoken to? Self   Refill Screening Questions    Would patient like to speak to a pharmacist? No   When does the patient need to receive the medication? 05/20/22   Refill Delivery Questions    How will the patient receive the medication? Delivery Laura   When does the patient need to receive the medication? 05/20/22   Shipping Address Home   Address in [facility] confirmed and updated if neccessary? Yes   Expected Copay ($) 0   Is the patient able to afford the medication copay? Yes   Payment Method zero copay   Days supply of Refill 28   Refill activity completed? Yes   Refill activity plan Refill scheduled   Shipment/Pickup Date: 05/16/22          Current Outpatient Medications   Medication Sig    acyclovir (ZOVIRAX) 400 MG tablet Take by mouth 2 (two) times daily.    albuterol (ACCUNEB) 1.25 mg/3 mL Nebu Take 3 mLs (1.25 mg total) by nebulization every 6 (six) hours as needed (wheeze/cough). Rescue    albuterol (PROVENTIL HFA) 90 mcg/actuation inhaler Inhale 2 puffs into the lungs every 6 (six) hours as needed for Wheezing. Rescue    alirocumab (PRALUENT PEN) 75 mg/mL PnIj Inject 1 mL (75 mg total) into the skin every 14 (fourteen) days.    amLODIPine (NORVASC) 5 MG tablet TAKE 1 TABLET BY MOUTH EVERY DAY    aspirin (ECOTRIN) 81 MG EC tablet Take 1 tablet (81 mg total) by mouth once daily.    benzonatate (TESSALON) 100 MG capsule Take 1 capsule (100 mg total) by mouth 3 (three) times daily as needed for Cough.    cholecalciferol,  vitamin D3, 125 mcg (5,000 unit) Tab 1 tablet DAILY (route: oral)    ENGERIX-B, PF, 20 mcg/mL Syrg     gabapentin (NEURONTIN) 300 MG capsule Take 2 capsules (600 mg total) by mouth 2 (two) times daily.    guaiFENesin (MUCINEX) 600 mg 12 hr tablet Take 1 tablet (600 mg total) by mouth 2 (two) times daily.    metFORMIN (GLUCOPHAGE) 1000 MG tablet Take 1 tablet (1,000 mg total) by mouth 2 (two) times daily with meals.    omeprazole (PRILOSEC) 40 MG capsule TAKE 1 CAPSULE BY MOUTH EVERY DAY    ondansetron (ZOFRAN-ODT) 4 MG TbDL Take 1 tablet (4 mg total) by mouth every 6 (six) hours as needed (nausea).    potassium chloride SA (K-DUR,KLOR-CON) 20 MEQ tablet TAKE 1 TABLET BY MOUTH ONCE DAILY    traMADoL (ULTRAM) 50 mg tablet Take 1 tablet (50 mg total) by mouth 2 (two) times daily as needed for Pain.    valsartan (DIOVAN) 80 MG tablet Take 1 tablet (80 mg total) by mouth once daily.    VELCADE 3.5 mg injection     zoledronic 4 mg/100 mL PgBk infusion    Last reviewed on 5/12/2022  3:01 PM by Adina Wright NP    Review of patient's allergies indicates:   Allergen Reactions    Lipitor [atorvastatin] Itching     Itching, elevated cpk, muscle aches, statin induced myositis    Last reviewed on  5/12/2022 3:01 PM by Adina Wright      Tasks added this encounter   6/10/2022 - Refill Call (Auto Added)   Tasks due within next 3 months   No tasks due.     Marco Anna  [facility]  [address] [facility] [address]  Phone: [phone]  Fax: [phone]

## 2022-05-18 ENCOUNTER — TELEPHONE (OUTPATIENT)
Dept: HEMATOLOGY/ONCOLOGY | Facility: CLINIC | Age: 72
End: 2022-05-18
Payer: MEDICARE

## 2022-05-19 ENCOUNTER — INFUSION (OUTPATIENT)
Dept: INFUSION THERAPY | Facility: HOSPITAL | Age: 72
End: 2022-05-19
Payer: MEDICARE

## 2022-05-19 VITALS
SYSTOLIC BLOOD PRESSURE: 159 MMHG | TEMPERATURE: 98 F | HEART RATE: 55 BPM | RESPIRATION RATE: 18 BRPM | DIASTOLIC BLOOD PRESSURE: 74 MMHG

## 2022-05-19 DIAGNOSIS — C90.01 MULTIPLE MYELOMA IN REMISSION: Primary | ICD-10-CM

## 2022-05-19 DIAGNOSIS — C90.02 MULTIPLE MYELOMA IN RELAPSE: ICD-10-CM

## 2022-05-19 PROCEDURE — 96401 CHEMO ANTI-NEOPL SQ/IM: CPT

## 2022-05-19 PROCEDURE — 63600175 PHARM REV CODE 636 W HCPCS: Mod: JW,JG | Performed by: INTERNAL MEDICINE

## 2022-05-19 RX ORDER — DIPHENHYDRAMINE HYDROCHLORIDE 50 MG/ML
50 INJECTION INTRAMUSCULAR; INTRAVENOUS ONCE AS NEEDED
Status: CANCELLED | OUTPATIENT
Start: 2022-06-02

## 2022-05-19 RX ORDER — FERROUS GLUCONATE 324(37.5)
TABLET ORAL
COMMUNITY
Start: 2022-02-19

## 2022-05-19 RX ORDER — BORTEZOMIB 3.5 MG/1
0.7 INJECTION, POWDER, LYOPHILIZED, FOR SOLUTION INTRAVENOUS; SUBCUTANEOUS
Status: CANCELLED | OUTPATIENT
Start: 2022-05-19

## 2022-05-19 RX ORDER — GADOBUTROL 604.72 MG/ML
INJECTION INTRAVENOUS
COMMUNITY
Start: 2022-02-03

## 2022-05-19 RX ORDER — DIPHENHYDRAMINE HYDROCHLORIDE 50 MG/ML
50 INJECTION INTRAMUSCULAR; INTRAVENOUS ONCE AS NEEDED
Status: CANCELLED | OUTPATIENT
Start: 2022-05-19

## 2022-05-19 RX ORDER — DARATUMUMAB AND HYALURONIDASE-FIHJ (HUMAN RECOMBINANT) 1800; 30000 MG/15ML; U/15ML
INJECTION SUBCUTANEOUS
COMMUNITY
Start: 2022-03-17

## 2022-05-19 RX ORDER — DIPHENHYDRAMINE HYDROCHLORIDE 50 MG/ML
50 INJECTION INTRAMUSCULAR; INTRAVENOUS ONCE AS NEEDED
Status: DISCONTINUED | OUTPATIENT
Start: 2022-05-19 | End: 2022-05-19 | Stop reason: HOSPADM

## 2022-05-19 RX ORDER — SERTRALINE HYDROCHLORIDE 50 MG/1
50 TABLET, FILM COATED ORAL DAILY
COMMUNITY
Start: 2022-04-13 | End: 2022-10-15

## 2022-05-19 RX ORDER — EPINEPHRINE 0.3 MG/.3ML
0.3 INJECTION SUBCUTANEOUS ONCE AS NEEDED
Status: CANCELLED | OUTPATIENT
Start: 2022-06-02

## 2022-05-19 RX ORDER — EPINEPHRINE 0.3 MG/.3ML
0.3 INJECTION SUBCUTANEOUS ONCE AS NEEDED
Status: CANCELLED | OUTPATIENT
Start: 2022-05-19

## 2022-05-19 RX ORDER — ZOLEDRONIC ACID 4 MG/5ML
INJECTION INTRAVENOUS
COMMUNITY
Start: 2022-01-20

## 2022-05-19 RX ORDER — EPINEPHRINE 0.3 MG/.3ML
0.3 INJECTION SUBCUTANEOUS ONCE AS NEEDED
Status: CANCELLED | OUTPATIENT
Start: 2022-05-26

## 2022-05-19 RX ORDER — BORTEZOMIB 3.5 MG/1
0.7 INJECTION, POWDER, LYOPHILIZED, FOR SOLUTION INTRAVENOUS; SUBCUTANEOUS
Status: COMPLETED | OUTPATIENT
Start: 2022-05-19 | End: 2022-05-19

## 2022-05-19 RX ORDER — BORTEZOMIB 3.5 MG/1
0.7 INJECTION, POWDER, LYOPHILIZED, FOR SOLUTION INTRAVENOUS; SUBCUTANEOUS
Status: CANCELLED | OUTPATIENT
Start: 2022-06-02

## 2022-05-19 RX ORDER — DIPHENHYDRAMINE HYDROCHLORIDE 50 MG/ML
50 INJECTION INTRAMUSCULAR; INTRAVENOUS ONCE AS NEEDED
Status: CANCELLED | OUTPATIENT
Start: 2022-05-26

## 2022-05-19 RX ORDER — BORTEZOMIB 3.5 MG/1
0.7 INJECTION, POWDER, LYOPHILIZED, FOR SOLUTION INTRAVENOUS; SUBCUTANEOUS
Status: CANCELLED | OUTPATIENT
Start: 2022-05-26

## 2022-05-19 RX ORDER — EPINEPHRINE 0.3 MG/.3ML
0.3 INJECTION SUBCUTANEOUS ONCE AS NEEDED
Status: DISCONTINUED | OUTPATIENT
Start: 2022-05-19 | End: 2022-05-19 | Stop reason: HOSPADM

## 2022-05-19 RX ADMIN — BORTEZOMIB 1.2 MG: 3.5 INJECTION, POWDER, LYOPHILIZED, FOR SOLUTION INTRAVENOUS; SUBCUTANEOUS at 10:05

## 2022-05-25 ENCOUNTER — OFFICE VISIT (OUTPATIENT)
Dept: NEUROLOGY | Facility: CLINIC | Age: 72
End: 2022-05-25
Payer: MEDICARE

## 2022-05-25 VITALS
BODY MASS INDEX: 26.5 KG/M2 | SYSTOLIC BLOOD PRESSURE: 118 MMHG | HEART RATE: 61 BPM | WEIGHT: 149.56 LBS | DIASTOLIC BLOOD PRESSURE: 69 MMHG

## 2022-05-25 DIAGNOSIS — R25.1 TREMOR: Primary | ICD-10-CM

## 2022-05-25 DIAGNOSIS — Z71.89 COUNSELING REGARDING GOALS OF CARE: ICD-10-CM

## 2022-05-25 DIAGNOSIS — I69.351 HEMIPLEGIA AND HEMIPARESIS FOLLOWING CEREBRAL INFARCTION AFFECTING RIGHT DOMINANT SIDE: ICD-10-CM

## 2022-05-25 DIAGNOSIS — F06.70 MILD NEUROCOGNITIVE DISORDER DUE TO MULTIPLE ETIOLOGIES: ICD-10-CM

## 2022-05-25 DIAGNOSIS — C90.02 MULTIPLE MYELOMA IN RELAPSE: ICD-10-CM

## 2022-05-25 DIAGNOSIS — R41.3 MEMORY LOSS: ICD-10-CM

## 2022-05-25 DIAGNOSIS — R27.8 SENSORY ATAXIA: ICD-10-CM

## 2022-05-25 DIAGNOSIS — J44.9 CHRONIC OBSTRUCTIVE PULMONARY DISEASE, UNSPECIFIED COPD TYPE: ICD-10-CM

## 2022-05-25 DIAGNOSIS — I69.30 HISTORY OF CVA WITH RESIDUAL DEFICIT: ICD-10-CM

## 2022-05-25 DIAGNOSIS — G62.0 DRUG-INDUCED POLYNEUROPATHY: ICD-10-CM

## 2022-05-25 PROCEDURE — 99215 OFFICE O/P EST HI 40 MIN: CPT | Mod: S$GLB,,, | Performed by: PHYSICIAN ASSISTANT

## 2022-05-25 PROCEDURE — 3074F SYST BP LT 130 MM HG: CPT | Mod: CPTII,S$GLB,, | Performed by: PHYSICIAN ASSISTANT

## 2022-05-25 PROCEDURE — 1159F MED LIST DOCD IN RCRD: CPT | Mod: CPTII,S$GLB,, | Performed by: PHYSICIAN ASSISTANT

## 2022-05-25 PROCEDURE — 99999 PR PBB SHADOW E&M-EST. PATIENT-LVL V: ICD-10-PCS | Mod: PBBFAC,,, | Performed by: PHYSICIAN ASSISTANT

## 2022-05-25 PROCEDURE — 3044F HG A1C LEVEL LT 7.0%: CPT | Mod: CPTII,S$GLB,, | Performed by: PHYSICIAN ASSISTANT

## 2022-05-25 PROCEDURE — 1126F AMNT PAIN NOTED NONE PRSNT: CPT | Mod: CPTII,S$GLB,, | Performed by: PHYSICIAN ASSISTANT

## 2022-05-25 PROCEDURE — 1101F PR PT FALLS ASSESS DOC 0-1 FALLS W/OUT INJ PAST YR: ICD-10-PCS | Mod: CPTII,S$GLB,, | Performed by: PHYSICIAN ASSISTANT

## 2022-05-25 PROCEDURE — 1101F PT FALLS ASSESS-DOCD LE1/YR: CPT | Mod: CPTII,S$GLB,, | Performed by: PHYSICIAN ASSISTANT

## 2022-05-25 PROCEDURE — 4010F PR ACE/ARB THEARPY RXD/TAKEN: ICD-10-PCS | Mod: CPTII,S$GLB,, | Performed by: PHYSICIAN ASSISTANT

## 2022-05-25 PROCEDURE — 3288F FALL RISK ASSESSMENT DOCD: CPT | Mod: CPTII,S$GLB,, | Performed by: PHYSICIAN ASSISTANT

## 2022-05-25 PROCEDURE — 3288F PR FALLS RISK ASSESSMENT DOCUMENTED: ICD-10-PCS | Mod: CPTII,S$GLB,, | Performed by: PHYSICIAN ASSISTANT

## 2022-05-25 PROCEDURE — 4010F ACE/ARB THERAPY RXD/TAKEN: CPT | Mod: CPTII,S$GLB,, | Performed by: PHYSICIAN ASSISTANT

## 2022-05-25 PROCEDURE — 3074F PR MOST RECENT SYSTOLIC BLOOD PRESSURE < 130 MM HG: ICD-10-PCS | Mod: CPTII,S$GLB,, | Performed by: PHYSICIAN ASSISTANT

## 2022-05-25 PROCEDURE — 99999 PR PBB SHADOW E&M-EST. PATIENT-LVL V: CPT | Mod: PBBFAC,,, | Performed by: PHYSICIAN ASSISTANT

## 2022-05-25 PROCEDURE — 1159F PR MEDICATION LIST DOCUMENTED IN MEDICAL RECORD: ICD-10-PCS | Mod: CPTII,S$GLB,, | Performed by: PHYSICIAN ASSISTANT

## 2022-05-25 PROCEDURE — 3008F BODY MASS INDEX DOCD: CPT | Mod: CPTII,S$GLB,, | Performed by: PHYSICIAN ASSISTANT

## 2022-05-25 PROCEDURE — 3044F PR MOST RECENT HEMOGLOBIN A1C LEVEL <7.0%: ICD-10-PCS | Mod: CPTII,S$GLB,, | Performed by: PHYSICIAN ASSISTANT

## 2022-05-25 PROCEDURE — 1160F RVW MEDS BY RX/DR IN RCRD: CPT | Mod: CPTII,S$GLB,, | Performed by: PHYSICIAN ASSISTANT

## 2022-05-25 PROCEDURE — 1160F PR REVIEW ALL MEDS BY PRESCRIBER/CLIN PHARMACIST DOCUMENTED: ICD-10-PCS | Mod: CPTII,S$GLB,, | Performed by: PHYSICIAN ASSISTANT

## 2022-05-25 PROCEDURE — 3078F DIAST BP <80 MM HG: CPT | Mod: CPTII,S$GLB,, | Performed by: PHYSICIAN ASSISTANT

## 2022-05-25 PROCEDURE — 3008F PR BODY MASS INDEX (BMI) DOCUMENTED: ICD-10-PCS | Mod: CPTII,S$GLB,, | Performed by: PHYSICIAN ASSISTANT

## 2022-05-25 PROCEDURE — 3078F PR MOST RECENT DIASTOLIC BLOOD PRESSURE < 80 MM HG: ICD-10-PCS | Mod: CPTII,S$GLB,, | Performed by: PHYSICIAN ASSISTANT

## 2022-05-25 PROCEDURE — 99215 PR OFFICE/OUTPT VISIT, EST, LEVL V, 40-54 MIN: ICD-10-PCS | Mod: S$GLB,,, | Performed by: PHYSICIAN ASSISTANT

## 2022-05-25 PROCEDURE — 1126F PR PAIN SEVERITY QUANTIFIED, NO PAIN PRESENT: ICD-10-PCS | Mod: CPTII,S$GLB,, | Performed by: PHYSICIAN ASSISTANT

## 2022-05-25 NOTE — PROGRESS NOTES
Name: Shelby Thompson  MRN: [id number]   CSN: [id number]      Date: 05/25/2022    Referring physician:  No referring provider defined for this encounter.    Chief Complaint: tremors     Interval History:  - b12 and b1 low, message sent to patient to begin supplementation but never read   - no falls   - cancer came back, following with Dr. Tineo   -  thinks weighted utensils have helped, she agrees some         From Feb 2022: Shelby Thompson is a R HANDED 72 y.o. female with a medical issues significant for hemiplegia and hemiparesis 2/2 CVA, HLD, hx of breast cancer, PAD, MDD, budd-chiari syndrome, multiple myeloma DMII and COPD who presents for tremors. Accompanied by good friend. Started after stroke (3 total). First stroke was 11 years ago, most recent stroke she believes was about 7 years. Tremors started after this and has worsened in the past year - year and a half. Only in the R hand. Always present. She has to hold her arm down with her left hand. Affects her when she eats. Eating with her left hand. No tremors in her left hand. Balance issues since the strokes. Had a bone marrow transplant a few years ago. Notices tremor at rest as well. No falls.   When she was getting chemo, she thought she saw a doctor with a dog. Sees dogs. She states it has only happened twice but her friend believes it happens more than that. No scary hallucinations. Scheduled for neuropsychology testing March 7th.   Has albuterol inhaler PRN.   Currently taking gabapentin 600 mg BID.         Family History:  No family history of tremors      Neuroleptic Exposure: none       From Jan 2022 with Dr. Parra  This 71 year old woman, now under treatment for multiple myeloma (she had a prior bone marrow transplant, and now received chemotherapy) , has presented to the Clinic with a four year history of progressive problems with short term memory.  She states that she may forget objects at home, or have difficulty tracking  appointments.  However, she also notes that she may lose her place in a conversation--forget what she was discussing,  The problems began two years before the patient's diagnosis of multiple myeloma.     The patient was accompanied to the examination by a friend.  Her friend pointed out that while that patient was walking with her, two or three months ago, she pointed out the presence of a dog in their path.  There was no dog present, in reality.  The patient is uncertain as to details of the event.  This finding has not recurred. However, it is noted that both velcade (for multiple myeloma) and alirocumab (for lowering of cholesterol) may promote hallucinations.      Noted in the patients history, and under consideration as an etiology for memory issues, is that of a history of stroke.  The patient has a history of hemorrhagic stroke in 2008 (in the region of the thalamus), and a history of hemorrhagic stroke in 2011 (in the region of the left posterior frontal lobe).  Residual effects of these stroke events have included dysarthria (mild),, and right sided weakness.  The patient notes that over time, she has developed difficulty with walking distances due to strength and due to balance.  In the last year, the patient reports a tremor at the right arm that has made it difficult for her to write.  While she has been seen by neurologists in the past, records here do not indicate neurological followup in the [facility] system.      MRI scans of the brain were reviewed from 4-22-13 and 4-30-19.  They reveal bilateral basal ganglia stroke, and thalamic stroke.  The earlier study shows additional evidence of prior hemorrhage at the right posterior inferior temporal lobe and the left occipital lobe.  An MRI scan of the brain, without and with contrast, is pending.      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 The patient takes gabapentin for symptoms of discomfort with peripheral neuropathy.     The patient denies any recent stroke events, or TIA symptomatology.     Nonmotor/Premotor ROS:  Anosmia: normal   Dysarthria/Hypophonia: at baseline   Dysphagia/Sialorrhea: some sialorrhea if she talks too fast   Depression: none   Cognitive slowing: following with Dr. Parra   Hallucinations: as abpve   Urinary changes: none   Constipation: none   Falls: none   Micrographia: doesn't write much anymore   Sleep issues:  -RBD: none     Review of Systems:   Review of Systems   Constitutional: Negative for chills, fever and malaise/fatigue.   HENT: Negative for hearing loss.    Eyes: Negative for blurred vision and double vision.   Respiratory: Negative for cough, shortness of breath and stridor.    Cardiovascular: Negative for chest pain and leg swelling.   Gastrointestinal: Negative for constipation, diarrhea and nausea.   Genitourinary: Negative for frequency and urgency.   Musculoskeletal: Negative for falls.   Skin: Negative for itching and rash.   Neurological: Positive for tremors. Negative for dizziness, loss of consciousness and weakness.   Psychiatric/Behavioral: Positive for memory loss. Negative for hallucinations.           Past Medical History: The patient  has a past medical history of Acute respiratory failure with hypoxia (4/30/2019), FUENTES (acute kidney injury) (5/1/2019), Allergy, Anemia, Aortic aneurysm, Breast cancer (10/2011), Cataract, Chronic diastolic congestive heart failure (11/6/2015), Colon polyp, Community acquired pneumonia of right lower lobe of lung (8/3/2021), GERD (gastroesophageal reflux disease), Hiatal hernia, History of colonic polyps, psychiatric care, breast cancer, Hyperlipemia, Hypertension, ICH (intracerebral hemorrhage), Major depressive disorder, single episode, mild (6/23/2016), Nuclear sclerosis (7/21/2014), 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    Social History: The patient  reports that she quit smoking about 11 years ago. Her smoking use included cigarettes. She started smoking about 55 years ago. She has a 11.00 pack-year smoking history. She has never used smokeless tobacco. She reports previous alcohol use. She reports that she does not use drugs.    Family History: Their family history includes Cancer in her father; Cancer (age of onset: 63) in her sister; Dementia in her sister; Fibroids in her daughter; Hypertension in her daughter, maternal grandmother, and son; No Known Problems in her brother, brother, maternal aunt, maternal grandfather, maternal uncle, mother, paternal aunt, paternal grandfather, paternal grandmother, and paternal uncle.    Allergies: Lipitor [atorvastatin]     Meds:   Current Outpatient Medications on File Prior to Visit   Medication Sig Dispense Refill    acyclovir (ZOVIRAX) 400 MG tablet Take by mouth 2 (two) times daily.      albuterol (ACCUNEB) 1.25 mg/3 mL Nebu Take 3 mLs (1.25 mg total) by nebulization every 6 (six) hours as needed (wheeze/cough). Rescue 75 mL 2    albuterol (PROVENTIL HFA) 90 mcg/actuation inhaler Inhale 2 puffs into the lungs every 6 (six) hours as needed for Wheezing. Rescue 6.7 g 0    alirocumab (PRALUENT PEN) 75 mg/mL PnIj Inject 1 mL (75 mg total) into the skin every 14 (fourteen) days. 6 mL 3    amLODIPine (NORVASC) 5 MG tablet TAKE 1 TABLET BY MOUTH EVERY DAY 90 tablet 3    aspirin (ECOTRIN) 81 MG EC tablet Take 1 tablet (81 mg total) by mouth once daily. 90 tablet 3    benzonatate (TESSALON) 100  MG capsule Take 1 capsule (100 mg total) by mouth 3 (three) times daily as needed for Cough. 30 capsule 0    cholecalciferol, vitamin D3, 125 mcg (5,000 unit) Tab 1 tablet DAILY (route: oral)      DARZALEX FASPRO 1,800 mg-30,000 unit/15 mL Soln       ENGERIX-B, PF, 20 mcg/mL Syrg       ferrous gluconate 324 mg (37.5 mg iron) Tab tablet 1 tablet DAILY (route: oral)      gabapentin (NEURONTIN) 300 MG capsule Take 2 capsules (600 mg total) by mouth 2 (two) times daily. 180 capsule 2    GADAVIST 1 mmol/mL (604.72 mg/mL) Soln injection       guaiFENesin (MUCINEX) 600 mg 12 hr tablet Take 1 tablet (600 mg total) by mouth 2 (two) times daily. 30 tablet 0    metFORMIN (GLUCOPHAGE) 1000 MG tablet Take 1 tablet (1,000 mg total) by mouth 2 (two) times daily with meals. 180 tablet 3    omeprazole (PRILOSEC) 40 MG capsule TAKE 1 CAPSULE BY MOUTH EVERY DAY 90 capsule 3    ondansetron (ZOFRAN-ODT) 4 MG TbDL Take 1 tablet (4 mg total) by mouth every 6 (six) hours as needed (nausea). 12 tablet 0    OXYGEN-AIR DELIVERY SYSTEMS MISC 1-2 Liter O2 - CONTINUOUS (route: Oxygen)      potassium chloride SA (K-DUR,KLOR-CON) 20 MEQ tablet TAKE 1 TABLET BY MOUTH ONCE DAILY 90 tablet 3    sertraline (ZOLOFT) 50 MG tablet Take 50 mg by mouth once daily.      traMADoL (ULTRAM) 50 mg tablet Take 1 tablet (50 mg total) by mouth 2 (two) times daily as needed for Pain. 60 tablet 3    valsartan (DIOVAN) 80 MG tablet Take 1 tablet (80 mg total) by mouth once daily. 90 tablet 3    VELCADE 3.5 mg injection       zoledronic 4 mg/100 mL PgBk infusion       zoledronic acid (ZOMETA) 4 mg/5 mL injection        No current facility-administered medications on file prior to visit.       Exam:  /69   Pulse 61   Wt 67.8 kg (149 lb 9.3 oz)   LMP  (LMP Unknown)   BMI 26.50 kg/m²     Constitutional  Well-developed, well-nourished, appears stated age   Ophthalmoscopic  No papilledema with no hemorrhages or exudates bilaterally    Cardiovascular  Radial pulses 2+ and symmetric, no LE edema bilaterally   Neurological    * Mental status  MOCA =      - Orientation  Oriented to person, place, time, and situation     - Memory   Intact recent and remote     - Attention/concentration  Attentive, vigilant during exam     - Language  Naming & repetition intact, +2-step commands     - Fund of knowledge  Aware of current events     - Executive  Well-organized thoughts     - Other     * Cranial nerves       - CN II  PERRL, visual fields full to confrontation     - CN III, IV, VI  Extraocular movements full, normal pursuits and saccades     - CN V  Sensation V1 - V3 intact     - CN VII  Face strong and symmetric bilaterally     - CN VIII  Hearing intact bilaterally     - CN IX, X  Palate raises midline and symmetric     - CN XI  SCM and trapezius 5/5 bilaterally     - CN XII  Tongue midline   * Motor  Muscle bulk normal, strength 5/5 throughout   * Sensory   Intact to temperature, decreased vibratory sense to bilateral LEs up to the knees    * Coordination  severe R hand dysmetria with finger to nose    * Gait  See below.   * Deep tendon reflexes  3+ RUE    crossadductors    diminished patellar and achilles reflexes    * Specialized movement exam  No hypophonic speech.    No facial masking.   No cogwheel rigidity, paratonia of RUE    severe R bradykinesia 2/2 tremor overlay and dis coordination     No motor impersistence.   slightly wide based gait, slow cautious gait    No shortened stride length.   No abnormal arm swing.     romberg present    high amplitude resting tremor of R hand    high amplitude, chaotic tremor of R hand, postural and kinetic, worsen in wing beating position and outstretched arm    outflow tremor, mild choreoathetosis of RUE as well    similarly there is mild choreoathetosis of RLE only on distraction      Laboratory/Radiological:  - Results:  Lab Visit on 05/12/2022   Component Date Value Ref Range Status    Immunofix Interp.  05/12/2022 SEE COMMENT   Final    Kappa Free Light Chains 05/12/2022 11.03 (A) 0.33 - 1.94 mg/dL Final    Lambda Free Light Chains 05/12/2022 1.02  0.57 - 2.63 mg/dL Final    Kappa/Lambda FLC Ratio 05/12/2022 10.81 (A) 0.26 - 1.65 Final    IgG 05/12/2022 1181  650 - 1600 mg/dL Final    IgA 05/12/2022 43  40 - 350 mg/dL Final    IgM 05/12/2022 33 (A) 50 - 300 mg/dL Final    Protein, Serum 05/12/2022 7.3  6.0 - 8.4 g/dL Final    Albumin 05/12/2022 4.30  3.35 - 5.55 g/dL Final    Alpha-1 05/12/2022 0.28  0.17 - 0.41 g/dL Final    Alpha-2 05/12/2022 0.89  0.43 - 0.99 g/dL Final    Beta 05/12/2022 0.78  0.50 - 1.10 g/dL Final    Gamma 05/12/2022 1.04  0.67 - 1.58 g/dL Final    WBC 05/12/2022 3.14 (A) 3.90 - 12.70 K/uL Final    RBC 05/12/2022 4.32  4.00 - 5.40 M/uL Final    Hemoglobin 05/12/2022 10.7 (A) 12.0 - 16.0 g/dL Final    Hematocrit 05/12/2022 34.5 (A) 37.0 - 48.5 % Final    MCV 05/12/2022 80 (A) 82 - 98 fL Final    MCH 05/12/2022 24.8 (A) 27.0 - 31.0 pg Final    MCHC 05/12/2022 31.0 (A) 32.0 - 36.0 g/dL Final    RDW 05/12/2022 16.2 (A) 11.5 - 14.5 % Final    Platelets 05/12/2022 186  150 - 450 K/uL Final    MPV 05/12/2022 12.7  9.2 - 12.9 fL Final    Immature Granulocytes 05/12/2022 0.3  0.0 - 0.5 % Final    Gran # (ANC) 05/12/2022 1.7 (A) 1.8 - 7.7 K/uL Final    Immature Grans (Abs) 05/12/2022 0.01  0.00 - 0.04 K/uL Final    Lymph # 05/12/2022 1.0  1.0 - 4.8 K/uL Final    Mono # 05/12/2022 0.4  0.3 - 1.0 K/uL Final    Eos # 05/12/2022 0.1  0.0 - 0.5 K/uL Final    Baso # 05/12/2022 0.01  0.00 - 0.20 K/uL Final    nRBC 05/12/2022 0  0 /100 WBC Final    Gran % 05/12/2022 52.9  38.0 - 73.0 % Final    Lymph % 05/12/2022 32.8  18.0 - 48.0 % Final    Mono % 05/12/2022 12.1  4.0 - 15.0 % Final    Eosinophil % 05/12/2022 1.6  0.0 - 8.0 % Final    Basophil % 05/12/2022 0.3  0.0 - 1.9 % Final    Differential Method 05/12/2022 Automated   Final    Sodium 05/12/2022 143  136 - 145 mmol/L  Final    Potassium 05/12/2022 4.2  3.5 - 5.1 mmol/L Final    Chloride 05/12/2022 106  95 - 110 mmol/L Final    CO2 05/12/2022 28  23 - 29 mmol/L Final    Glucose 05/12/2022 87  70 - 110 mg/dL Final    BUN 05/12/2022 13  8 - 23 mg/dL Final    Creatinine 05/12/2022 0.8  0.5 - 1.4 mg/dL Final    Calcium 05/12/2022 9.7  8.7 - 10.5 mg/dL Final    Total Protein 05/12/2022 7.6  6.0 - 8.4 g/dL Final    Albumin 05/12/2022 4.2  3.5 - 5.2 g/dL Final    Total Bilirubin 05/12/2022 0.5  0.1 - 1.0 mg/dL Final    Alkaline Phosphatase 05/12/2022 45 (A) 55 - 135 U/L Final    AST 05/12/2022 14  10 - 40 U/L Final    ALT 05/12/2022 7 (A) 10 - 44 U/L Final    Anion Gap 05/12/2022 9  8 - 16 mmol/L Final    eGFR if African American 05/12/2022 >60.0  >60 mL/min/1.73 m^2 Final    eGFR if non African American 05/12/2022 >60.0  >60 mL/min/1.73 m^2 Final    Pathologist Interpretation NATALY 05/12/2022 REVIEWED   Final    Pathologist Interpretation SPE 05/12/2022 REVIEWED   Final   Lab Visit on 04/13/2022   Component Date Value Ref Range Status    WBC 04/13/2022 3.67 (A) 3.90 - 12.70 K/uL Final    RBC 04/13/2022 4.27  4.00 - 5.40 M/uL Final    Hemoglobin 04/13/2022 10.4 (A) 12.0 - 16.0 g/dL Final    Hematocrit 04/13/2022 34.7 (A) 37.0 - 48.5 % Final    MCV 04/13/2022 81 (A) 82 - 98 fL Final    MCH 04/13/2022 24.4 (A) 27.0 - 31.0 pg Final    MCHC 04/13/2022 30.0 (A) 32.0 - 36.0 g/dL Final    RDW 04/13/2022 16.8 (A) 11.5 - 14.5 % Final    Platelets 04/13/2022 192  150 - 450 K/uL Final    MPV 04/13/2022 11.8  9.2 - 12.9 fL Final    Immature Granulocytes 04/13/2022 0.0  0.0 - 0.5 % Final    Gran # (ANC) 04/13/2022 2.3  1.8 - 7.7 K/uL Final    Immature Grans (Abs) 04/13/2022 0.00  0.00 - 0.04 K/uL Final    Lymph # 04/13/2022 1.0  1.0 - 4.8 K/uL Final    Mono # 04/13/2022 0.3  0.3 - 1.0 K/uL Final    Eos # 04/13/2022 0.0  0.0 - 0.5 K/uL Final    Baso # 04/13/2022 0.02  0.00 - 0.20 K/uL Final    nRBC 04/13/2022 0  0  /100 WBC Final    Gran % 04/13/2022 62.5  38.0 - 73.0 % Final    Lymph % 04/13/2022 27.2  18.0 - 48.0 % Final    Mono % 04/13/2022 9.0  4.0 - 15.0 % Final    Eosinophil % 04/13/2022 0.8  0.0 - 8.0 % Final    Basophil % 04/13/2022 0.5  0.0 - 1.9 % Final    Differential Method 04/13/2022 Automated   Final    Sodium 04/13/2022 144  136 - 145 mmol/L Final    Potassium 04/13/2022 4.0  3.5 - 5.1 mmol/L Final    Chloride 04/13/2022 107  95 - 110 mmol/L Final    CO2 04/13/2022 27  23 - 29 mmol/L Final    Glucose 04/13/2022 99  70 - 110 mg/dL Final    BUN 04/13/2022 19  8 - 23 mg/dL Final    Creatinine 04/13/2022 0.8  0.5 - 1.4 mg/dL Final    Calcium 04/13/2022 10.0  8.7 - 10.5 mg/dL Final    Total Protein 04/13/2022 7.6  6.0 - 8.4 g/dL Final    Albumin 04/13/2022 4.0  3.5 - 5.2 g/dL Final    Total Bilirubin 04/13/2022 0.4  0.1 - 1.0 mg/dL Final    Alkaline Phosphatase 04/13/2022 43 (A) 55 - 135 U/L Final    AST 04/13/2022 13  10 - 40 U/L Final    ALT 04/13/2022 7 (A) 10 - 44 U/L Final    Anion Gap 04/13/2022 10  8 - 16 mmol/L Final    eGFR if African American 04/13/2022 >60.0  >60 mL/min/1.73 m^2 Final    eGFR if non African American 04/13/2022 >60.0  >60 mL/min/1.73 m^2 Final    Immunofix Interp. 04/13/2022 SEE COMMENT   Final    Kappa Free Light Chains 04/13/2022 8.09 (A) 0.33 - 1.94 mg/dL Final    Lambda Free Light Chains 04/13/2022 1.24  0.57 - 2.63 mg/dL Final    Kappa/Lambda FLC Ratio 04/13/2022 6.52 (A) 0.26 - 1.65 Final    IgG 04/13/2022 1061  650 - 1600 mg/dL Final    IgA 04/13/2022 53  40 - 350 mg/dL Final    IgM 04/13/2022 36 (A) 50 - 300 mg/dL Final    Protein, Serum 04/13/2022 7.2  6.0 - 8.4 g/dL Final    Albumin 04/13/2022 4.32  3.35 - 5.55 g/dL Final    Alpha-1 04/13/2022 0.30  0.17 - 0.41 g/dL Final    Alpha-2 04/13/2022 0.90  0.43 - 0.99 g/dL Final    Beta 04/13/2022 0.76  0.50 - 1.10 g/dL Final    Gamma 04/13/2022 0.92  0.67 - 1.58 g/dL Final    Pathologist  Interpretation NATALY 04/13/2022 REVIEWED   Final    Pathologist Interpretation SPE 04/13/2022 REVIEWED   Final   Lab Visit on 03/17/2022   Component Date Value Ref Range Status    WBC 03/17/2022 4.22  3.90 - 12.70 K/uL Final    RBC 03/17/2022 4.42  4.00 - 5.40 M/uL Final    Hemoglobin 03/17/2022 10.9 (A) 12.0 - 16.0 g/dL Final    Hematocrit 03/17/2022 36.5 (A) 37.0 - 48.5 % Final    MCV 03/17/2022 83  82 - 98 fL Final    MCH 03/17/2022 24.7 (A) 27.0 - 31.0 pg Final    MCHC 03/17/2022 29.9 (A) 32.0 - 36.0 g/dL Final    RDW 03/17/2022 16.8 (A) 11.5 - 14.5 % Final    Platelets 03/17/2022 150  150 - 450 K/uL Final    MPV 03/17/2022 SEE COMMENT  9.2 - 12.9 fL Final    Immature Granulocytes 03/17/2022 0.2  0.0 - 0.5 % Final    Gran # (ANC) 03/17/2022 2.6  1.8 - 7.7 K/uL Final    Immature Grans (Abs) 03/17/2022 0.01  0.00 - 0.04 K/uL Final    Lymph # 03/17/2022 1.2  1.0 - 4.8 K/uL Final    Mono # 03/17/2022 0.3  0.3 - 1.0 K/uL Final    Eos # 03/17/2022 0.1  0.0 - 0.5 K/uL Final    Baso # 03/17/2022 0.02  0.00 - 0.20 K/uL Final    nRBC 03/17/2022 0  0 /100 WBC Final    Gran % 03/17/2022 62.1  38.0 - 73.0 % Final    Lymph % 03/17/2022 28.2  18.0 - 48.0 % Final    Mono % 03/17/2022 7.8  4.0 - 15.0 % Final    Eosinophil % 03/17/2022 1.2  0.0 - 8.0 % Final    Basophil % 03/17/2022 0.5  0.0 - 1.9 % Final    Aniso 03/17/2022 Slight   Final    Poik 03/17/2022 Slight   Final    Poly 03/17/2022 Occasional   Final    Hypo 03/17/2022 Occasional   Final    Ovalocytes 03/17/2022 Occasional   Final    Fragmented Cells 03/17/2022 Occasional   Final    Differential Method 03/17/2022 Automated   Final    Sodium 03/17/2022 144  136 - 145 mmol/L Final    Potassium 03/17/2022 4.0  3.5 - 5.1 mmol/L Final    Chloride 03/17/2022 106  95 - 110 mmol/L Final    CO2 03/17/2022 28  23 - 29 mmol/L Final    Glucose 03/17/2022 89  70 - 110 mg/dL Final    BUN 03/17/2022 13  8 - 23 mg/dL Final    Creatinine 03/17/2022  0.8  0.5 - 1.4 mg/dL Final    Calcium 03/17/2022 9.4  8.7 - 10.5 mg/dL Final    Total Protein 03/17/2022 7.7  6.0 - 8.4 g/dL Final    Albumin 03/17/2022 4.2  3.5 - 5.2 g/dL Final    Total Bilirubin 03/17/2022 0.4  0.1 - 1.0 mg/dL Final    Alkaline Phosphatase 03/17/2022 51 (A) 55 - 135 U/L Final    AST 03/17/2022 16  10 - 40 U/L Final    ALT 03/17/2022 12  10 - 44 U/L Final    Anion Gap 03/17/2022 10  8 - 16 mmol/L Final    eGFR if African American 03/17/2022 >60.0  >60 mL/min/1.73 m^2 Final    eGFR if non African American 03/17/2022 >60.0  >60 mL/min/1.73 m^2 Final    Immunofix Interp. 03/17/2022 SEE COMMENT   Final    Kappa Free Light Chains 03/17/2022 6.42 (A) 0.33 - 1.94 mg/dL Final    Lambda Free Light Chains 03/17/2022 1.40  0.57 - 2.63 mg/dL Final    Kappa/Lambda FLC Ratio 03/17/2022 4.59 (A) 0.26 - 1.65 Final    IgG 03/17/2022 1022  650 - 1600 mg/dL Final    IgA 03/17/2022 51  40 - 350 mg/dL Final    IgM 03/17/2022 57  50 - 300 mg/dL Final    Protein, Serum 03/17/2022 7.7  6.0 - 8.4 g/dL Final    Albumin 03/17/2022 4.53  3.35 - 5.55 g/dL Final    Alpha-1 03/17/2022 0.35  0.17 - 0.41 g/dL Final    Alpha-2 03/17/2022 0.99  0.43 - 0.99 g/dL Final    Beta 03/17/2022 0.85  0.50 - 1.10 g/dL Final    Gamma 03/17/2022 0.99  0.67 - 1.58 g/dL Final    Hemoglobin A1C 03/17/2022 5.7 (A) 4.0 - 5.6 % Final    Estimated Avg Glucose 03/17/2022 117  68 - 131 mg/dL Final    Pathologist Interpretation NATALY 03/17/2022 REVIEWED   Final    Pathologist Interpretation SPE 03/17/2022 REVIEWED   Final       - Independent review of images:  Brain MRI from 2/2022      - Independent review of consultant's notes: Homonoff    ASSESSMENT/PLAN:  1. Tremor   - RUE thalamic outflow tremor, 2/2 L thalamic infarct   - discussed different tremor medications and side effects, cannot have propranolol 2/2 COPD   - currently on gabapentin 600 mg BID without any tremor benefit  - primidone and topamax likely would worsen  memory, scheduled to do neuropsych evaluation soon   - discussed the use of weighted utensils -- this seems to be helping, may benefit from ready steady glove  - discussed that outflow tremors can be difficult to treat and that side effects of medications may outweigh any benefit (if any) we would get from some of the tremor medications.       2. Imbalance  - sensory ataxia likely multifactorial 2/2 multiple myeloma, chemo-induced peripheral neuropathy  - low B12 and B1, messaged sent to patient to begin supplementation but was not seen  - discussed importance of supplementation -- plans to start today   - prior CVA also contributing to some imbalance       3. Memory Loss  - previously followed with Dr. Parra  - had NP testing  - going to schedule to see Dr. Drummond soon                  Follow up: in 3 months with [facility]     Collaborating Physician, Dr. Johnson, was available during today's encounter. Any change to plan along with cosign to appear in the EMR.       Total time spent with the patient: 45 minutes. 35 minutes of face-to-face consultation and 10 minutes of chart review and coordination of care, on the day of the visit. This includes face to face time and non-face to face time preparing to see the patient (eg, review of tests), obtaining and/or reviewing separately obtained history, documenting clinical information in the electronic or other health record, independently interpreting resultsand communicating results to the patient/family/caregiver, or care coordination.         Becki Velarde PA-C   [facility]  Department of Neurology  Movement Disorders

## 2022-05-25 NOTE — PATIENT INSTRUCTIONS
Begin supplementing with 1000 mcg of B12 daily and 100-200 mg of B1 (thiamine) daily. You can buy this over-the-counter at any pharmacy.

## 2022-05-26 ENCOUNTER — INFUSION (OUTPATIENT)
Dept: INFUSION THERAPY | Facility: HOSPITAL | Age: 72
End: 2022-05-26
Payer: MEDICARE

## 2022-05-26 VITALS
DIASTOLIC BLOOD PRESSURE: 71 MMHG | TEMPERATURE: 99 F | SYSTOLIC BLOOD PRESSURE: 142 MMHG | RESPIRATION RATE: 16 BRPM | HEART RATE: 73 BPM

## 2022-05-26 DIAGNOSIS — C90.01 MULTIPLE MYELOMA IN REMISSION: Primary | ICD-10-CM

## 2022-05-26 DIAGNOSIS — C90.02 MULTIPLE MYELOMA IN RELAPSE: ICD-10-CM

## 2022-05-26 PROCEDURE — 63600175 PHARM REV CODE 636 W HCPCS: Mod: JG | Performed by: INTERNAL MEDICINE

## 2022-05-26 PROCEDURE — 96401 CHEMO ANTI-NEOPL SQ/IM: CPT

## 2022-05-26 RX ORDER — BORTEZOMIB 3.5 MG/1
0.7 INJECTION, POWDER, LYOPHILIZED, FOR SOLUTION INTRAVENOUS; SUBCUTANEOUS
Status: COMPLETED | OUTPATIENT
Start: 2022-05-26 | End: 2022-05-26

## 2022-05-26 RX ADMIN — BORTEZOMIB 1.2 MG: 3.5 INJECTION, POWDER, LYOPHILIZED, FOR SOLUTION INTRAVENOUS; SUBCUTANEOUS at 09:05

## 2022-05-27 RX ORDER — METFORMIN HYDROCHLORIDE 1000 MG/1
1000 TABLET ORAL 2 TIMES DAILY WITH MEALS
Qty: 180 TABLET | Refills: 3 | Status: SHIPPED | OUTPATIENT
Start: 2022-05-27 | End: 2022-08-14 | Stop reason: SDUPTHER

## 2022-06-02 ENCOUNTER — INFUSION (OUTPATIENT)
Dept: INFUSION THERAPY | Facility: HOSPITAL | Age: 72
End: 2022-06-02
Payer: MEDICARE

## 2022-06-02 VITALS
RESPIRATION RATE: 18 BRPM | TEMPERATURE: 98 F | DIASTOLIC BLOOD PRESSURE: 74 MMHG | SYSTOLIC BLOOD PRESSURE: 138 MMHG | HEART RATE: 74 BPM

## 2022-06-02 DIAGNOSIS — C90.02 MULTIPLE MYELOMA IN RELAPSE: ICD-10-CM

## 2022-06-02 DIAGNOSIS — C90.01 MULTIPLE MYELOMA IN REMISSION: Primary | ICD-10-CM

## 2022-06-02 PROCEDURE — 96401 CHEMO ANTI-NEOPL SQ/IM: CPT

## 2022-06-02 PROCEDURE — 63600175 PHARM REV CODE 636 W HCPCS: Mod: JW,JG | Performed by: INTERNAL MEDICINE

## 2022-06-02 RX ORDER — BORTEZOMIB 3.5 MG/1
0.7 INJECTION, POWDER, LYOPHILIZED, FOR SOLUTION INTRAVENOUS; SUBCUTANEOUS
Status: COMPLETED | OUTPATIENT
Start: 2022-06-02 | End: 2022-06-02

## 2022-06-02 RX ORDER — EPINEPHRINE 0.3 MG/.3ML
0.3 INJECTION SUBCUTANEOUS ONCE AS NEEDED
Status: DISCONTINUED | OUTPATIENT
Start: 2022-06-02 | End: 2022-06-02 | Stop reason: HOSPADM

## 2022-06-02 RX ORDER — DIPHENHYDRAMINE HYDROCHLORIDE 50 MG/ML
50 INJECTION INTRAMUSCULAR; INTRAVENOUS ONCE AS NEEDED
Status: DISCONTINUED | OUTPATIENT
Start: 2022-06-02 | End: 2022-06-02 | Stop reason: HOSPADM

## 2022-06-02 RX ADMIN — BORTEZOMIB 1.2 MG: 3.5 INJECTION, POWDER, LYOPHILIZED, FOR SOLUTION INTRAVENOUS; SUBCUTANEOUS at 10:06

## 2022-06-08 ENCOUNTER — PATIENT MESSAGE (OUTPATIENT)
Dept: HEMATOLOGY/ONCOLOGY | Facility: CLINIC | Age: 72
End: 2022-06-08
Payer: MEDICARE

## 2022-06-08 ENCOUNTER — TELEPHONE (OUTPATIENT)
Dept: HEMATOLOGY/ONCOLOGY | Facility: CLINIC | Age: 72
End: 2022-06-08
Payer: MEDICARE

## 2022-06-08 NOTE — TELEPHONE ENCOUNTER
Unable to verbally confirm appointments scheduled for 6/9/22 due to busy signal. Detailed message sent through the patient portal.

## 2022-06-09 ENCOUNTER — OFFICE VISIT (OUTPATIENT)
Dept: HEMATOLOGY/ONCOLOGY | Facility: CLINIC | Age: 72
End: 2022-06-09
Payer: MEDICARE

## 2022-06-09 ENCOUNTER — INFUSION (OUTPATIENT)
Dept: INFUSION THERAPY | Facility: HOSPITAL | Age: 72
End: 2022-06-09
Payer: MEDICARE

## 2022-06-09 VITALS
SYSTOLIC BLOOD PRESSURE: 167 MMHG | WEIGHT: 146.81 LBS | OXYGEN SATURATION: 95 % | HEART RATE: 70 BPM | RESPIRATION RATE: 16 BRPM | BODY MASS INDEX: 26.01 KG/M2 | DIASTOLIC BLOOD PRESSURE: 73 MMHG | HEIGHT: 63 IN

## 2022-06-09 VITALS
TEMPERATURE: 98 F | SYSTOLIC BLOOD PRESSURE: 134 MMHG | HEART RATE: 59 BPM | DIASTOLIC BLOOD PRESSURE: 65 MMHG | RESPIRATION RATE: 18 BRPM

## 2022-06-09 DIAGNOSIS — C90.00 MULTIPLE MYELOMA, REMISSION STATUS UNSPECIFIED: ICD-10-CM

## 2022-06-09 DIAGNOSIS — Z94.84 HISTORY OF AUTO STEM CELL TRANSPLANT: ICD-10-CM

## 2022-06-09 DIAGNOSIS — C90.01 MULTIPLE MYELOMA IN REMISSION: Primary | ICD-10-CM

## 2022-06-09 DIAGNOSIS — C90.02 MULTIPLE MYELOMA IN RELAPSE: ICD-10-CM

## 2022-06-09 PROCEDURE — 3008F PR BODY MASS INDEX (BMI) DOCUMENTED: ICD-10-PCS | Mod: CPTII,S$GLB,, | Performed by: INTERNAL MEDICINE

## 2022-06-09 PROCEDURE — 3077F PR MOST RECENT SYSTOLIC BLOOD PRESSURE >= 140 MM HG: ICD-10-PCS | Mod: CPTII,S$GLB,, | Performed by: INTERNAL MEDICINE

## 2022-06-09 PROCEDURE — 1101F PT FALLS ASSESS-DOCD LE1/YR: CPT | Mod: CPTII,S$GLB,, | Performed by: INTERNAL MEDICINE

## 2022-06-09 PROCEDURE — 3044F PR MOST RECENT HEMOGLOBIN A1C LEVEL <7.0%: ICD-10-PCS | Mod: CPTII,S$GLB,, | Performed by: INTERNAL MEDICINE

## 2022-06-09 PROCEDURE — 96401 CHEMO ANTI-NEOPL SQ/IM: CPT

## 2022-06-09 PROCEDURE — 1126F AMNT PAIN NOTED NONE PRSNT: CPT | Mod: CPTII,S$GLB,, | Performed by: INTERNAL MEDICINE

## 2022-06-09 PROCEDURE — 4010F PR ACE/ARB THEARPY RXD/TAKEN: ICD-10-PCS | Mod: CPTII,S$GLB,, | Performed by: INTERNAL MEDICINE

## 2022-06-09 PROCEDURE — 99215 PR OFFICE/OUTPT VISIT, EST, LEVL V, 40-54 MIN: ICD-10-PCS | Mod: S$GLB,,, | Performed by: INTERNAL MEDICINE

## 2022-06-09 PROCEDURE — 4010F ACE/ARB THERAPY RXD/TAKEN: CPT | Mod: CPTII,S$GLB,, | Performed by: INTERNAL MEDICINE

## 2022-06-09 PROCEDURE — 99999 PR PBB SHADOW E&M-EST. PATIENT-LVL V: CPT | Mod: PBBFAC,,, | Performed by: INTERNAL MEDICINE

## 2022-06-09 PROCEDURE — 1159F MED LIST DOCD IN RCRD: CPT | Mod: CPTII,S$GLB,, | Performed by: INTERNAL MEDICINE

## 2022-06-09 PROCEDURE — 63600175 PHARM REV CODE 636 W HCPCS: Mod: TB | Performed by: INTERNAL MEDICINE

## 2022-06-09 PROCEDURE — 99999 PR PBB SHADOW E&M-EST. PATIENT-LVL V: ICD-10-PCS | Mod: PBBFAC,,, | Performed by: INTERNAL MEDICINE

## 2022-06-09 PROCEDURE — 3078F DIAST BP <80 MM HG: CPT | Mod: CPTII,S$GLB,, | Performed by: INTERNAL MEDICINE

## 2022-06-09 PROCEDURE — 3077F SYST BP >= 140 MM HG: CPT | Mod: CPTII,S$GLB,, | Performed by: INTERNAL MEDICINE

## 2022-06-09 PROCEDURE — 99215 OFFICE O/P EST HI 40 MIN: CPT | Mod: S$GLB,,, | Performed by: INTERNAL MEDICINE

## 2022-06-09 PROCEDURE — 1101F PR PT FALLS ASSESS DOC 0-1 FALLS W/OUT INJ PAST YR: ICD-10-PCS | Mod: CPTII,S$GLB,, | Performed by: INTERNAL MEDICINE

## 2022-06-09 PROCEDURE — 3044F HG A1C LEVEL LT 7.0%: CPT | Mod: CPTII,S$GLB,, | Performed by: INTERNAL MEDICINE

## 2022-06-09 PROCEDURE — 3288F PR FALLS RISK ASSESSMENT DOCUMENTED: ICD-10-PCS | Mod: CPTII,S$GLB,, | Performed by: INTERNAL MEDICINE

## 2022-06-09 PROCEDURE — 3288F FALL RISK ASSESSMENT DOCD: CPT | Mod: CPTII,S$GLB,, | Performed by: INTERNAL MEDICINE

## 2022-06-09 PROCEDURE — 3078F PR MOST RECENT DIASTOLIC BLOOD PRESSURE < 80 MM HG: ICD-10-PCS | Mod: CPTII,S$GLB,, | Performed by: INTERNAL MEDICINE

## 2022-06-09 PROCEDURE — 1159F PR MEDICATION LIST DOCUMENTED IN MEDICAL RECORD: ICD-10-PCS | Mod: CPTII,S$GLB,, | Performed by: INTERNAL MEDICINE

## 2022-06-09 PROCEDURE — 1126F PR PAIN SEVERITY QUANTIFIED, NO PAIN PRESENT: ICD-10-PCS | Mod: CPTII,S$GLB,, | Performed by: INTERNAL MEDICINE

## 2022-06-09 PROCEDURE — 3008F BODY MASS INDEX DOCD: CPT | Mod: CPTII,S$GLB,, | Performed by: INTERNAL MEDICINE

## 2022-06-09 RX ORDER — BORTEZOMIB 3.5 MG/1
0.7 INJECTION, POWDER, LYOPHILIZED, FOR SOLUTION INTRAVENOUS; SUBCUTANEOUS
Status: CANCELLED | OUTPATIENT
Start: 2022-06-09

## 2022-06-09 RX ORDER — BORTEZOMIB 3.5 MG/1
0.7 INJECTION, POWDER, LYOPHILIZED, FOR SOLUTION INTRAVENOUS; SUBCUTANEOUS
Status: COMPLETED | OUTPATIENT
Start: 2022-06-09 | End: 2022-06-09

## 2022-06-09 RX ORDER — DIPHENHYDRAMINE HYDROCHLORIDE 50 MG/ML
50 INJECTION INTRAMUSCULAR; INTRAVENOUS ONCE AS NEEDED
Status: CANCELLED | OUTPATIENT
Start: 2022-06-09

## 2022-06-09 RX ORDER — POMALIDOMIDE 4 MG/1
4 CAPSULE ORAL DAILY
Qty: 21 CAPSULE | Refills: 0 | Status: SHIPPED | OUTPATIENT
Start: 2022-06-09 | End: 2022-06-10 | Stop reason: SDUPTHER

## 2022-06-09 RX ORDER — EPINEPHRINE 0.3 MG/.3ML
0.3 INJECTION SUBCUTANEOUS ONCE AS NEEDED
Status: CANCELLED | OUTPATIENT
Start: 2022-06-09

## 2022-06-09 RX ADMIN — BORTEZOMIB 1.2 MG: 3.5 INJECTION, POWDER, LYOPHILIZED, FOR SOLUTION INTRAVENOUS; SUBCUTANEOUS at 03:06

## 2022-06-09 RX ADMIN — DARATUMUMAB AND HYALURONIDASE-FIHJ (HUMAN RECOMBINANT) 1800 MG: 1800; 30000 INJECTION SUBCUTANEOUS at 03:06

## 2022-06-09 NOTE — Clinical Note
cbc, cmp, serum free light chains, quantitative immunoglobulins, serum electropheresis, serum immunofixation, daratumumab injection and MD appt or NP appt in 4 weeks

## 2022-06-09 NOTE — PROGRESS NOTES
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  She achieved and tolerated well VRd x completing 7, 28 day cycles and achieving deep VGPR to induction. Then underwent Melphalan autologous stem cell transplant on 2/12/2020.      Extensive PMH as below.    2 prior CVAs (one in 2008, one in 2011)  L breast cancer DCIS stage 0 (s/p lumpectomy and radiation, no endocrine tx due to CVA)  HTN  DM II  Neuropathy, b/l hands  Prior smoker, quit in 2011  Hepatic lesions - vascular per recent MRI in 09 /2019 with no changes; will confirm no further rascon needed from help  Statin Intolerance - on praluent, PCSK9 inhibitor  HFpEF - EF 55%, diastolic dysfunction  Anxiety/Depression     ADMISSION SUMMARY: 2/10-2/26/2020  Admitted 2/10, tolerated chemo and transplant well. Engrafted on day +12. Remained afebrile throughout hospital stay and no mucositis. Experienced expected side effects of nausea and diarrhea controlled with antiemetics and Imodium. Discharged home with home PT/OT       Subjective:       Initial History: Ms. Thompson is a 72 y.o. female who returns for follow up of multiple myeloma in remission. She is 2 year 2 month post AutoSCT.   On kenia/zhanna/dex after biochemical relapse noted approximately 1 year post transplant. Tolerating and responding well. post-stroke cognitive dysfunction, but with progressive decline over the past several years. Her  had recent surgery, so limited help with house work.. Her friend assist her for transportation and appointments. Following by neurologist for cognitive/memory decline.   Fingertip  neuropathy stable.Accompanied by her friend.    Today is C12D1 kenia/zhanna    Mild serial increase in biochemical studies over last 3 months with no CRAB.    Presents with close friend to discuss next steps.  Recovering from 3-4 day viral gastroenteritis. Symptoms nearly resolved.      Past Medical History:   Past Medical History:   Diagnosis Date    Acute respiratory failure with hypoxia 4/30/2019    FUENTES (acute kidney injury) 5/1/2019    Allergy     Anemia     Aortic aneurysm     Breast cancer 10/2011    left breast Stage 0 DCIS    Cataract     Chronic diastolic congestive heart failure 11/6/2015    Colon polyp     Community acquired pneumonia of right lower lobe of lung 8/3/2021    GERD (gastroesophageal reflux disease)     Hiatal hernia     History of colonic polyps     Hx of psychiatric care     Zoloft    HX: breast cancer     Hyperlipemia     Hypertension     ICH (intracerebral hemorrhage)     Major depressive disorder, single episode, mild 6/23/2016    Nuclear sclerosis 7/21/2014    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 7/21/2014    PAD (peripheral artery disease) 11/6/2015    Psychiatric problem     Statin-induced rhabdomyolysis     4/2015     Stroke 4/2011    Type 2 diabetes mellitus with diabetic peripheral angiopathy without gangrene, with long-term current use of insulin 3/31/2021    Type 2 diabetes mellitus without complication, without long-term current use of insulin 2/10/2020    Walker as ambulation aid        Past Surgical HIstory:   Past Surgical History:   Procedure Laterality Date    APPENDECTOMY      BONE MARROW BIOPSY Left 10/3/2019    Procedure: Biopsy-bone marrow;  Surgeon: Jere Tineo MD;  Location: [facility] OR [address] [place] [address];  Service: Oncology;  Laterality: Left;    BREAST BIOPSY Left 10/2011    BREAST LUMPECTOMY Left 2011    DCIS    COLONOSCOPY      COLONOSCOPY N/A 1/9/2020    Procedure: COLONOSCOPY;  Surgeon: Quang De La Cruz  MD;  Location: [facility] ([address] [place] [address]);  Service: Endoscopy;  Laterality: N/A;    CYST REMOVAL      back    ESOPHAGOGASTRODUODENOSCOPY  07/2016    duodenal angioectasia    ESOPHAGOGASTRODUODENOSCOPY N/A 1/9/2020    Procedure: EGD (ESOPHAGOGASTRODUODENOSCOPY);  Surgeon: Quang De La Cruz MD;  Location: [facility] ([address] [place] [address]);  Service: Endoscopy;  Laterality: N/A;    FOOT FRACTURE SURGERY  10/2012    right foot, with R hallux valgus repair    FRACTURE SURGERY Right     broken toe repiar    HEMORRHOID SURGERY      LIVER BIOPSY  04/2015    essentially normal, no fibrosis    TUBAL LIGATION      UPPER GASTROINTESTINAL ENDOSCOPY         Family History:   Family History   Problem Relation Age of Onset    Dementia Sister         x1 sister    Cancer Sister 63        Lung Cancer    No Known Problems Mother     Cancer Father     No Known Problems Brother     Hypertension Daughter     Fibroids Daughter         uterine    Hypertension Son     No Known Problems Brother     No Known Problems Maternal Aunt     No Known Problems Maternal Uncle     No Known Problems Paternal Aunt     No Known Problems Paternal Uncle     Hypertension Maternal Grandmother     No Known Problems Maternal Grandfather     No Known Problems Paternal Grandmother     No Known Problems Paternal Grandfather     Ovarian cancer Neg Hx     Colon cancer Neg Hx     Tremor Neg Hx     Amblyopia Neg Hx     Blindness Neg Hx     Cataracts Neg Hx     Diabetes Neg Hx     Glaucoma Neg Hx     Macular degeneration Neg Hx     Retinal detachment Neg Hx     Strabismus Neg Hx     Stroke Neg Hx     Thyroid disease Neg Hx     Esophageal cancer Neg Hx     Rectal cancer Neg Hx     Stomach cancer Neg Hx     Ulcerative colitis Neg Hx     Crohn's disease Neg Hx     Irritable bowel syndrome Neg Hx     Celiac disease Neg Hx        Social History:  reports that she quit smoking about 11 years ago. Her smoking use included cigarettes. She started  smoking about 55 years ago. She has a 11.00 pack-year smoking history. She has never used smokeless tobacco. She reports previous alcohol use. She reports that she does not use drugs.    Allergies:  Review of patient's allergies indicates:   Allergen Reactions    Lipitor [atorvastatin] Itching     Itching, elevated cpk, muscle aches, statin induced myositis       Medications:  Current Outpatient Medications   Medication Sig Dispense Refill    acyclovir (ZOVIRAX) 400 MG tablet Take by mouth 2 (two) times daily.      albuterol (ACCUNEB) 1.25 mg/3 mL Nebu Take 3 mLs (1.25 mg total) by nebulization every 6 (six) hours as needed (wheeze/cough). Rescue 75 mL 2    albuterol (PROVENTIL HFA) 90 mcg/actuation inhaler Inhale 2 puffs into the lungs every 6 (six) hours as needed for Wheezing. Rescue 6.7 g 0    alirocumab (PRALUENT PEN) 75 mg/mL PnIj Inject 1 mL (75 mg total) into the skin every 14 (fourteen) days. 6 mL 3    amLODIPine (NORVASC) 5 MG tablet TAKE 1 TABLET BY MOUTH EVERY DAY 90 tablet 3    aspirin (ECOTRIN) 81 MG EC tablet Take 1 tablet (81 mg total) by mouth once daily. 90 tablet 3    benzonatate (TESSALON) 100 MG capsule Take 1 capsule (100 mg total) by mouth 3 (three) times daily as needed for Cough. 30 capsule 0    cholecalciferol, vitamin D3, 125 mcg (5,000 unit) Tab 1 tablet DAILY (route: oral)      DARZALEX FASPRO 1,800 mg-30,000 unit/15 mL Soln       ENGERIX-B, PF, 20 mcg/mL Syrg       ferrous gluconate 324 mg (37.5 mg iron) Tab tablet 1 tablet DAILY (route: oral)      gabapentin (NEURONTIN) 300 MG capsule Take 2 capsules (600 mg total) by mouth 2 (two) times daily. 180 capsule 2    GADAVIST 1 mmol/mL (604.72 mg/mL) Soln injection       guaiFENesin (MUCINEX) 600 mg 12 hr tablet Take 1 tablet (600 mg total) by mouth 2 (two) times daily. 30 tablet 0    metFORMIN (GLUCOPHAGE) 1000 MG tablet Take 1 tablet (1,000 mg total) by mouth 2 (two) times daily with meals. 180 tablet 3    omeprazole  (PRILOSEC) 40 MG capsule TAKE 1 CAPSULE BY MOUTH EVERY DAY 90 capsule 3    ondansetron (ZOFRAN-ODT) 4 MG TbDL Take 1 tablet (4 mg total) by mouth every 6 (six) hours as needed (nausea). 12 tablet 0    OXYGEN-AIR DELIVERY SYSTEMS MISC 1-2 Liter O2 - CONTINUOUS (route: Oxygen)      potassium chloride SA (K-DUR,KLOR-CON) 20 MEQ tablet Take 1 tablet (20 mEq total) by mouth once daily. 90 tablet 3    sertraline (ZOLOFT) 50 MG tablet Take 50 mg by mouth once daily.      traMADoL (ULTRAM) 50 mg tablet Take 1 tablet (50 mg total) by mouth 2 (two) times daily as needed for Pain. 60 tablet 3    valsartan (DIOVAN) 80 MG tablet Take 1 tablet (80 mg total) by mouth once daily. 90 tablet 3    VELCADE 3.5 mg injection       zoledronic 4 mg/100 mL PgBk infusion       zoledronic acid (ZOMETA) 4 mg/5 mL injection        No current facility-administered medications for this visit.       Review of Systems   Constitutional: Positive for fatigue. Negative for appetite change, chills and fever.   HENT: Negative for ear discharge, ear pain, nosebleeds, postnasal drip, rhinorrhea, sinus pressure and sore throat.    Eyes: Negative for pain.   Respiratory: Negative for chest tightness and shortness of breath.    Cardiovascular: Negative for chest pain, palpitations and leg swelling.   Gastrointestinal: Negative for abdominal distention, abdominal pain, blood in stool, constipation, diarrhea, nausea and vomiting.   Genitourinary: Negative.  Negative for dysuria and hematuria.   Musculoskeletal: Negative.  Negative for back pain, gait problem and myalgias.   Skin: Negative.    Neurological: Positive for tremors and numbness. Negative for syncope and headaches.   Hematological: Negative for adenopathy. Does not bruise/bleed easily.   Psychiatric/Behavioral: The patient is not nervous/anxious.         Memory issues       ECOG Performance Status: 2 (due to remote  CVA)   Objective:      Vitals:   Vitals:    06/09/22 1334   BP: (!) 167/73    Pulse: 70   Resp: 16        PE:   General -in wheelchair  no apparent distress  HEENT - oropharynx clear  Chest and Lung -bilateral end expiratory wheezes  Cardiovascular - RRR with no MGR, normal S1 and S2  Abdomen-  soft, nontender, no palpable hepatomegaly or splenomegaly  Lymph - no palpable lymphadenopathy  Heme - no bruising, petechiae, pallor  Skin - no rashes or lesions  Psych - appropriate mood and affect  Neuro -  No change in chronic residual CVA symptoms    Laboratory Data:  Lab Visit on 06/09/2022   Component Date Value Ref Range Status    WBC 06/09/2022 3.49 (A) 3.90 - 12.70 K/uL Final    RBC 06/09/2022 3.93 (A) 4.00 - 5.40 M/uL Final    Hemoglobin 06/09/2022 9.9 (A) 12.0 - 16.0 g/dL Final    Hematocrit 06/09/2022 32.3 (A) 37.0 - 48.5 % Final    MCV 06/09/2022 82  82 - 98 fL Final    MCH 06/09/2022 25.2 (A) 27.0 - 31.0 pg Final    MCHC 06/09/2022 30.7 (A) 32.0 - 36.0 g/dL Final    RDW 06/09/2022 16.4 (A) 11.5 - 14.5 % Final    Platelets 06/09/2022 170  150 - 450 K/uL Final    MPV 06/09/2022 12.8  9.2 - 12.9 fL Final    Immature Granulocytes 06/09/2022 0.0  0.0 - 0.5 % Final    Gran # (ANC) 06/09/2022 2.2  1.8 - 7.7 K/uL Final    Immature Grans (Abs) 06/09/2022 0.00  0.00 - 0.04 K/uL Final    Comment: Mild elevation in immature granulocytes is non specific and   can be seen in a variety of conditions including stress response,   acute inflammation, trauma and pregnancy. Correlation with other   laboratory and clinical findings is essential.      Lymph # 06/09/2022 1.0  1.0 - 4.8 K/uL Final    Mono # 06/09/2022 0.3  0.3 - 1.0 K/uL Final    Eos # 06/09/2022 0.0  0.0 - 0.5 K/uL Final    Baso # 06/09/2022 0.02  0.00 - 0.20 K/uL Final    nRBC 06/09/2022 0  0 /100 WBC Final    Gran % 06/09/2022 61.9  38.0 - 73.0 % Final    Lymph % 06/09/2022 28.4  18.0 - 48.0 % Final    Mono % 06/09/2022 8.0  4.0 - 15.0 % Final    Eosinophil % 06/09/2022 1.1  0.0 - 8.0 % Final    Basophil % 06/09/2022  0.6  0.0 - 1.9 % Final    Differential Method 06/09/2022 Automated   Final    Protein, Serum 06/09/2022 6.9  6.0 - 8.4 g/dL Final    Comment: Serum protein electrophoresis and immunofixation results should be   interpreted in clinical context in that some therapeutic agents can   result   in false positive results (example, daratumumab). Correlation with   the   patient s therapeutic regimen is required.        Lab Results   Component Value Date    WBC 3.49 (L) 06/09/2022    HGB 9.9 (L) 06/09/2022    HCT 32.3 (L) 06/09/2022    MCV 82 06/09/2022     06/09/2022       Gran # (ANC)   Date Value Ref Range Status   06/09/2022 2.2 1.8 - 7.7 K/uL Final     Gran %   Date Value Ref Range Status   06/09/2022 61.9 38.0 - 73.0 % Final     Kappa Free Light Chains   Date Value Ref Range Status   05/12/2022 11.03 (H) 0.33 - 1.94 mg/dL Final   04/13/2022 8.09 (H) 0.33 - 1.94 mg/dL Final   03/17/2022 6.42 (H) 0.33 - 1.94 mg/dL Final     Lambda Free Light Chains   Date Value Ref Range Status   05/12/2022 1.02 0.57 - 2.63 mg/dL Final   04/13/2022 1.24 0.57 - 2.63 mg/dL Final   03/17/2022 1.40 0.57 - 2.63 mg/dL Final     Kappa/Lambda FLC Ratio   Date Value Ref Range Status   05/12/2022 10.81 (H) 0.26 - 1.65 Final     Comment:     Undetected antigen excess is a rare event but cannot   be excluded. If these free light chain results do not   agree with other clinical or laboratory findings or   if the sample is from a patient that has previously   demonstrated antigen excess, discuss with the testing   laboratory.   Results should always be interpreted in conjunction   with other laboratory tests and clinical evidence.     04/13/2022 6.52 (H) 0.26 - 1.65 Final     Comment:     Undetected antigen excess is a rare event but cannot   be excluded. If these free light chain results do not   agree with other clinical or laboratory findings or   if the sample is from a patient that has previously   demonstrated antigen excess, discuss  with the testing   laboratory.   Results should always be interpreted in conjunction   with other laboratory tests and clinical evidence.     03/17/2022 4.59 (H) 0.26 - 1.65 Final     Comment:     Undetected antigen excess is a rare event but cannot   be excluded. If these free light chain results do not   agree with other clinical or laboratory findings or   if the sample is from a patient that has previously   demonstrated antigen excess, discuss with the testing   laboratory.   Results should always be interpreted in conjunction   with other laboratory tests and clinical evidence.       IgG   Date Value Ref Range Status   05/12/2022 1181 650 - 1600 mg/dL Final     Comment:     IgG Cord Blood Reference Range: 650-1600 mg/dL.     IgA   Date Value Ref Range Status   05/12/2022 43 40 - 350 mg/dL Final     Comment:     IgA Cord Blood Reference Range: <5 mg/dL.     IgM   Date Value Ref Range Status   05/12/2022 33 (L) 50 - 300 mg/dL Final     Comment:     IgM Cord Blood Reference Range: <25 mg/dL.        Imaging:      Assessment:       Ms. Thompson is a 71 year old female with relapsed multiple myeloma.     Plan:     MM s/p Auto SCT  - high risk MM with 17p deletion  - 2 year 3 month  s/p Auto SCT  - started maintenance q2w velcade 5/6/20   - serial biochemical relapse  Noted and  given this and high risk CG , transitioned to Sravanthi/Noble (1mg/m2) /dex  Salvage July 2021  to which she is tolerating; currently sp 11 cycles; presents for cycle 12  - Due to uncontrolled hyperglycemia dex stopped after C4. Continue on sravanthi/noble until progression. Sravanthi q 4 weekly, weekly velcade.  velcade decreased to 1mg/m2 for grade 1 neuropathy and subsequently 0.7mg/m2   -supportive meds:   continue ppx acyclovir, asa; resume maintenance zometa q 3 months for 1 year; last dose on 01/22 ,missed next dose. Will schedule with next visit.   -she had demonstrated mild biochemical progression over last 3 biochemical studies with no CRAB, in light  "of this and high risk CG recommended we transition her therapy from Sravanthi/Velcade to Sravanthi/pomalyst; will even discuss introduction of low dose weekly dex 12-20mg into regimen if no significant improvement in biochemical studies after 1-2 months sravanthi/pom/dex  -already on asa 81mg   -pomalyst sent to specialty pharmacy; recommended she start once receives; will ask pharm D to do drug teaching; can proceed with sravanthi/velcade today but no further velcade after today; sravanthi now monthly   -post transplant vaccines completed     anemia due to chemotherapy  - stable mild  -due to therapy monitor      Neuropathy  -Stable   - continues gabapentin and prn tramadol  -dose reduced velcade further to 0.7mg/m2  12/16/21) ; no further velcade as above    SOB/descending aortic aneurysm  - CTA and dopplers ordered urgently with high concern for DVT/PE; completed 8/3/20  - incidental descending thoracic aortic aneurysm noted; referred to thoracic surgery; seen 8/7/20; per note: "at current size would not recommend any intervention as risk of rupture remains low.  Recommend surveillance CT chest every 12 months to monitor growth of the aneurysm.  Would typically recommend aspirin 81 mg daily in patients with aortic aneurysm  - ASA started (9/14/20)     HTN  - continue norvasc  - Patient to monitor BP closely  Anxiety/depression  - continue zoloft    DM2  - diet controlled    Hx of CVA  - s/p 2008, 2011    HLD with Statin Intolerance  -on praluent, PCSK9 inhibitor    L breast cancer DCIS stage 0  -s/p lumpectomy and radiation, no endocrine tx due to CVA    Coarse tremors/memory issues  -  Stable coarse tremors   - Short term memory issues worsened  - repeat MRI brain 12/16/21 Multifocal areas of remote parenchymal hemorrhage similar to prior exam, possibility of underlying cerebral amyloid angiopathy.  - Close f/u with neuro.     FU:  cbc, cmp, serum free light chains, quantitative immunoglobulins, serum electropheresis, serum " immunofixation, daratumumab injection/zometa infusion  and MD appt or NP appt in 4 weeks

## 2022-06-09 NOTE — PLAN OF CARE
Pt tolerated Darzalex and Velcade SQ injections to the abdomen today. Pt does not need pre meds per updated protocol. MD [facility] Aware. Pre meds removed from treatment plan. VSS. NAD declined AVS. Uses my [facility]. Discharged home. Ambulated independently with family.   Problem: Adult Inpatient Plan of Care  Goal: Optimal Comfort and Wellbeing  Intervention: Provide Person-Centered Care  Flowsheets (Taken 6/9/2022 8165)  Trust Relationship/Rapport:   care explained   thoughts/feelings acknowledged   choices provided   emotional support provided   empathic listening provided   questions answered   questions encouraged   reassurance provided

## 2022-06-10 ENCOUNTER — SPECIALTY PHARMACY (OUTPATIENT)
Dept: PHARMACY | Facility: CLINIC | Age: 72
End: 2022-06-10
Payer: MEDICARE

## 2022-06-10 DIAGNOSIS — Z94.84 HISTORY OF AUTO STEM CELL TRANSPLANT: ICD-10-CM

## 2022-06-10 DIAGNOSIS — C90.00 MULTIPLE MYELOMA, REMISSION STATUS UNSPECIFIED: ICD-10-CM

## 2022-06-10 RX ORDER — POMALIDOMIDE 4 MG/1
4 CAPSULE ORAL DAILY
Qty: 21 CAPSULE | Refills: 0 | Status: SHIPPED | OUTPATIENT
Start: 2022-06-10 | End: 2022-07-05

## 2022-06-10 NOTE — TELEPHONE ENCOUNTER
----- Message from Iesha Brar sent at 6/10/2022  7:57 AM CDT -----  Regarding: Rx Selegine Number  Contact: Dea [facility]  Consult/Advisory:           Name Of Caller: Dea Osorio  Contact Preference?: [phone]           What is the nature of the call?:     Dea [facility] is calling to get the selegine number for pt's rx:    pomalidomide (POMALYST) 4 mg Cap        Thank you for all that you do for our patients!

## 2022-06-13 ENCOUNTER — TELEPHONE (OUTPATIENT)
Dept: HEMATOLOGY/ONCOLOGY | Facility: CLINIC | Age: 72
End: 2022-06-13
Payer: MEDICARE

## 2022-06-13 NOTE — TELEPHONE ENCOUNTER
Spoke to [facility], prior auth done over the phone, ref # [id number]. Auth # [id number]. [employer] to fax auth number

## 2022-06-13 NOTE — TELEPHONE ENCOUNTER
"----- Message from Seamus Bachon sent at 6/13/2022 10:04 AM CDT -----  Consult/Advisory:          Name Of Caller: [facility] ([facility])             Contact Preference?:  [phone]   Fax  [phone]    key code [id number]        Provider Name: Noman      Does patient feel the need to be seen today? No      What is the nature of the call?: Requesting prior auth for pomalidomide (POMALYST) 4 mg Cap refill           Additional Notes:  "Thank you for all that you do for our patients"      "

## 2022-06-14 ENCOUNTER — TELEPHONE (OUTPATIENT)
Dept: HEMATOLOGY/ONCOLOGY | Facility: CLINIC | Age: 72
End: 2022-06-14
Payer: MEDICARE

## 2022-06-14 NOTE — TELEPHONE ENCOUNTER
"----- Message from Saumya Rebolledo sent at 6/14/2022  8:43 AM CDT -----  Regarding: Returning a Missed Scheduling Call  Contact Preference: [phone]         Patient returning phone call to:  Dr Tineo         Additional Notes:  "Thank you for all that you do for our patients'     "

## 2022-06-16 ENCOUNTER — INFUSION (OUTPATIENT)
Dept: INFUSION THERAPY | Facility: HOSPITAL | Age: 72
End: 2022-06-16
Payer: MEDICARE

## 2022-06-16 VITALS
TEMPERATURE: 98 F | SYSTOLIC BLOOD PRESSURE: 140 MMHG | DIASTOLIC BLOOD PRESSURE: 81 MMHG | OXYGEN SATURATION: 96 % | RESPIRATION RATE: 16 BRPM | HEART RATE: 64 BPM

## 2022-06-16 DIAGNOSIS — C90.01 MULTIPLE MYELOMA IN REMISSION: Primary | ICD-10-CM

## 2022-06-16 DIAGNOSIS — C90.02 MULTIPLE MYELOMA IN RELAPSE: ICD-10-CM

## 2022-06-16 PROCEDURE — 63600175 PHARM REV CODE 636 W HCPCS: Mod: JG | Performed by: INTERNAL MEDICINE

## 2022-06-16 PROCEDURE — 96401 CHEMO ANTI-NEOPL SQ/IM: CPT

## 2022-06-16 RX ORDER — BORTEZOMIB 3.5 MG/1
0.7 INJECTION, POWDER, LYOPHILIZED, FOR SOLUTION INTRAVENOUS; SUBCUTANEOUS
Status: CANCELLED | OUTPATIENT
Start: 2022-06-30

## 2022-06-16 RX ORDER — EPINEPHRINE 0.3 MG/.3ML
0.3 INJECTION SUBCUTANEOUS ONCE AS NEEDED
Status: DISCONTINUED | OUTPATIENT
Start: 2022-06-16 | End: 2022-06-16 | Stop reason: HOSPADM

## 2022-06-16 RX ORDER — EPINEPHRINE 0.3 MG/.3ML
0.3 INJECTION SUBCUTANEOUS ONCE AS NEEDED
Status: CANCELLED | OUTPATIENT
Start: 2022-06-23

## 2022-06-16 RX ORDER — BORTEZOMIB 3.5 MG/1
0.7 INJECTION, POWDER, LYOPHILIZED, FOR SOLUTION INTRAVENOUS; SUBCUTANEOUS
Status: CANCELLED | OUTPATIENT
Start: 2022-06-23

## 2022-06-16 RX ORDER — DIPHENHYDRAMINE HYDROCHLORIDE 50 MG/ML
50 INJECTION INTRAMUSCULAR; INTRAVENOUS ONCE AS NEEDED
Status: CANCELLED | OUTPATIENT
Start: 2022-06-30

## 2022-06-16 RX ORDER — BORTEZOMIB 3.5 MG/1
0.7 INJECTION, POWDER, LYOPHILIZED, FOR SOLUTION INTRAVENOUS; SUBCUTANEOUS
Status: COMPLETED | OUTPATIENT
Start: 2022-06-16 | End: 2022-06-16

## 2022-06-16 RX ORDER — EPINEPHRINE 0.3 MG/.3ML
0.3 INJECTION SUBCUTANEOUS ONCE AS NEEDED
Status: CANCELLED | OUTPATIENT
Start: 2022-06-16

## 2022-06-16 RX ORDER — DIPHENHYDRAMINE HYDROCHLORIDE 50 MG/ML
50 INJECTION INTRAMUSCULAR; INTRAVENOUS ONCE AS NEEDED
Status: CANCELLED | OUTPATIENT
Start: 2022-06-16

## 2022-06-16 RX ORDER — DIPHENHYDRAMINE HYDROCHLORIDE 50 MG/ML
50 INJECTION INTRAMUSCULAR; INTRAVENOUS ONCE AS NEEDED
Status: DISCONTINUED | OUTPATIENT
Start: 2022-06-16 | End: 2022-06-16 | Stop reason: HOSPADM

## 2022-06-16 RX ORDER — EPINEPHRINE 0.3 MG/.3ML
0.3 INJECTION SUBCUTANEOUS ONCE AS NEEDED
Status: CANCELLED | OUTPATIENT
Start: 2022-06-30

## 2022-06-16 RX ORDER — BORTEZOMIB 3.5 MG/1
0.7 INJECTION, POWDER, LYOPHILIZED, FOR SOLUTION INTRAVENOUS; SUBCUTANEOUS
Status: CANCELLED | OUTPATIENT
Start: 2022-06-16

## 2022-06-16 RX ORDER — DIPHENHYDRAMINE HYDROCHLORIDE 50 MG/ML
50 INJECTION INTRAMUSCULAR; INTRAVENOUS ONCE AS NEEDED
Status: CANCELLED | OUTPATIENT
Start: 2022-06-23

## 2022-06-16 RX ADMIN — BORTEZOMIB 1.2 MG: 3.5 INJECTION, POWDER, LYOPHILIZED, FOR SOLUTION INTRAVENOUS; SUBCUTANEOUS at 02:06

## 2022-06-16 NOTE — NURSING
Pt here for Velcade injection.  Assessment complete and labs reviewed.  VSS.  Administered injection to abdomen. No questions or concerns.  Pt ambulated out of unit unassisted.

## 2022-06-17 ENCOUNTER — SPECIALTY PHARMACY (OUTPATIENT)
Dept: PHARMACY | Facility: CLINIC | Age: 72
End: 2022-06-17
Payer: MEDICARE

## 2022-06-17 NOTE — TELEPHONE ENCOUNTER
Specialty Pharmacy - Refill Coordination    Specialty Medication Orders Linked to Encounter    Flowsheet Row Most Recent Value   Medication #1 alirocumab (PRALUENT PEN) 75 mg/mL PnIj (Order#[id number], Rx#[id number])          Refill Questions - Documented Responses    Flowsheet Row Most Recent Value   Patient Availability and HIPAA Verification    Does patient want to proceed with activity? Yes   HIPAA/medical authority confirmed? Yes   Relationship to patient of person spoken to? Self   Refill Screening Questions    Would patient like to speak to a pharmacist? No   When does the patient need to receive the medication? 06/24/22   Refill Delivery Questions    How will the patient receive the medication? Delivery Laura   When does the patient need to receive the medication? 06/24/22   Shipping Address Home   Address in [facility] confirmed and updated if neccessary? Yes   Expected Copay ($) 0   Is the patient able to afford the medication copay? Yes   Payment Method zero copay   Days supply of Refill 28   Refill activity completed? Yes   Refill activity plan Refill scheduled   Shipment/Pickup Date: 06/22/22          Current Outpatient Medications   Medication Sig    acyclovir (ZOVIRAX) 400 MG tablet Take by mouth 2 (two) times daily.    albuterol (ACCUNEB) 1.25 mg/3 mL Nebu Take 3 mLs (1.25 mg total) by nebulization every 6 (six) hours as needed (wheeze/cough). Rescue    albuterol (PROVENTIL HFA) 90 mcg/actuation inhaler Inhale 2 puffs into the lungs every 6 (six) hours as needed for Wheezing. Rescue    alirocumab (PRALUENT PEN) 75 mg/mL PnIj Inject 1 mL (75 mg total) into the skin every 14 (fourteen) days.    amLODIPine (NORVASC) 5 MG tablet TAKE 1 TABLET BY MOUTH EVERY DAY    aspirin (ECOTRIN) 81 MG EC tablet Take 1 tablet (81 mg total) by mouth once daily.    benzonatate (TESSALON) 100 MG capsule Take 1 capsule (100 mg total) by mouth 3 (three) times daily as needed for Cough.    cholecalciferol,  vitamin D3, 125 mcg (5,000 unit) Tab 1 tablet DAILY (route: oral)    DARZALEX FASPRO 1,800 mg-30,000 unit/15 mL Soln     ENGERIX-B, PF, 20 mcg/mL Syrg     ferrous gluconate 324 mg (37.5 mg iron) Tab tablet 1 tablet DAILY (route: oral)    gabapentin (NEURONTIN) 300 MG capsule Take 2 capsules (600 mg total) by mouth 2 (two) times daily.    GADAVIST 1 mmol/mL (604.72 mg/mL) Soln injection     guaiFENesin (MUCINEX) 600 mg 12 hr tablet Take 1 tablet (600 mg total) by mouth 2 (two) times daily.    metFORMIN (GLUCOPHAGE) 1000 MG tablet Take 1 tablet (1,000 mg total) by mouth 2 (two) times daily with meals.    omeprazole (PRILOSEC) 40 MG capsule TAKE 1 CAPSULE BY MOUTH EVERY DAY    ondansetron (ZOFRAN-ODT) 4 MG TbDL Take 1 tablet (4 mg total) by mouth every 6 (six) hours as needed (nausea).    OXYGEN-AIR DELIVERY SYSTEMS MISC 1-2 Liter O2 - CONTINUOUS (route: Oxygen)    pomalidomide (POMALYST) 4 mg Cap Take 1 capsule (4 mg total) by mouth Daily Auth # [id number] 6/10/22.    potassium chloride SA (K-DUR,KLOR-CON) 20 MEQ tablet Take 1 tablet (20 mEq total) by mouth once daily.    sertraline (ZOLOFT) 50 MG tablet Take 50 mg by mouth once daily.    traMADoL (ULTRAM) 50 mg tablet Take 1 tablet (50 mg total) by mouth 2 (two) times daily as needed for Pain.    valsartan (DIOVAN) 80 MG tablet Take 1 tablet (80 mg total) by mouth once daily.    VELCADE 3.5 mg injection     zoledronic 4 mg/100 mL PgBk infusion     zoledronic acid (ZOMETA) 4 mg/5 mL injection    Last reviewed on 6/9/2022  1:37 PM by Felicity Contreras MA    Review of patient's allergies indicates:   Allergen Reactions    Lipitor [atorvastatin] Itching     Itching, elevated cpk, muscle aches, statin induced myositis    Last reviewed on  6/16/2022 1:34 PM by Ilya Ambriz      Tasks added this encounter   7/15/2022 - Refill Call (Auto Added)   Tasks due within next 3 months   No tasks due.     Zainab Curry, PharmD  [facility]  [address] [facility] [address]  Phone: [phone]  Fax: [phone]

## 2022-06-21 ENCOUNTER — CLINICAL SUPPORT (OUTPATIENT)
Dept: HEMATOLOGY/ONCOLOGY | Facility: CLINIC | Age: 72
End: 2022-06-21
Payer: MEDICARE

## 2022-06-21 DIAGNOSIS — C90.00 MULTIPLE MYELOMA, REMISSION STATUS UNSPECIFIED: Primary | ICD-10-CM

## 2022-06-24 NOTE — PROGRESS NOTES
Pharmacist Patient Education Note     DaraPom chemotherapy regimen was discussed with the patient and her friend. Medication handouts for each agent were provided to the patient.     Administration instructions were discussed including:  Continuation of her monthly daratumumab and starting pomalidomide for 21 days on and 7 days off.     The patient stated that her first dose of pomalidomide was on 6/18/22 and states that she is only having slight diarrhea.      The following side effects were reviewed:    - Prevention and treatment of nausea/vomiting   - Prophylaxis against herpes virus with acyclovir and the importance of taking the medication as prescribed. Additionally, if myelosuppression were to occur additional antimicrobials would be added on to protect against bacteria and fungus while counts are low   - Fatigue   - Constipation/Diarrhea   - Peripheral neuropathy    - Rash    Patient was given further information regarding Pomalidomide:   - REMs program for close monitoring   - Increased risk of clots and the importance of taking a baby aspirin every day (unless instructed to hold for low platelets or preparation for a procedure)   - Avoid donating blood through treatment and up to 4 weeks after stopping Pomalyst   - Administer at about the same time each day with water; administer with or without food. Swallow capsule whole; do not break, open, or chew.   - If you miss a dose, take it as soon as you think about it ONLY if it has been less than 12 hours since your regular time. If it has been more than 12 hours, skip the missed dose and take your next dose at the regular time. If you vomit a dose, take your next dose at the regular time; do NOT take 2 doses at the same time and never double up a dose to replace the missed dose.    - Wash your hands after handling this medication. Caretakers should NOT handle this medicine with bare hands and should wear latex gloves.    - Store this medication at room  temperature. Avoid storing in a pill box with other medications.           All questions were answered.        Zainab Tatum, PharmD, BCPS, BCOP  Clinical Pharmacy Specialist   BMT/Hematology Oncology  SpectraLink: [id number]

## 2022-07-07 ENCOUNTER — INFUSION (OUTPATIENT)
Dept: INFUSION THERAPY | Facility: HOSPITAL | Age: 72
End: 2022-07-07
Payer: MEDICARE

## 2022-07-07 ENCOUNTER — OFFICE VISIT (OUTPATIENT)
Dept: HEMATOLOGY/ONCOLOGY | Facility: CLINIC | Age: 72
End: 2022-07-07
Payer: MEDICARE

## 2022-07-07 VITALS
DIASTOLIC BLOOD PRESSURE: 76 MMHG | RESPIRATION RATE: 16 BRPM | TEMPERATURE: 99 F | HEIGHT: 63 IN | HEART RATE: 53 BPM | BODY MASS INDEX: 26.7 KG/M2 | WEIGHT: 150.69 LBS | OXYGEN SATURATION: 97 % | SYSTOLIC BLOOD PRESSURE: 141 MMHG

## 2022-07-07 DIAGNOSIS — G25.2 COARSE TREMORS: ICD-10-CM

## 2022-07-07 DIAGNOSIS — E78.00 PURE HYPERCHOLESTEROLEMIA: ICD-10-CM

## 2022-07-07 DIAGNOSIS — R06.02 SHORTNESS OF BREATH: ICD-10-CM

## 2022-07-07 DIAGNOSIS — D75.9 DRUG-INDUCED CYTOPENIA: ICD-10-CM

## 2022-07-07 DIAGNOSIS — C80.1 NEUROPATHY ASSOCIATED WITH CANCER: ICD-10-CM

## 2022-07-07 DIAGNOSIS — D84.9 IMMUNOCOMPROMISED: ICD-10-CM

## 2022-07-07 DIAGNOSIS — C90.00 MULTIPLE MYELOMA, REMISSION STATUS UNSPECIFIED: Primary | ICD-10-CM

## 2022-07-07 DIAGNOSIS — C90.02 MULTIPLE MYELOMA IN RELAPSE: ICD-10-CM

## 2022-07-07 DIAGNOSIS — I10 ESSENTIAL HYPERTENSION: ICD-10-CM

## 2022-07-07 DIAGNOSIS — E11.9 TYPE 2 DIABETES MELLITUS WITHOUT COMPLICATION, WITHOUT LONG-TERM CURRENT USE OF INSULIN: ICD-10-CM

## 2022-07-07 DIAGNOSIS — I69.30 HISTORY OF CVA WITH RESIDUAL DEFICIT: ICD-10-CM

## 2022-07-07 DIAGNOSIS — G63 NEUROPATHY ASSOCIATED WITH CANCER: ICD-10-CM

## 2022-07-07 DIAGNOSIS — C90.01 MULTIPLE MYELOMA IN REMISSION: Primary | ICD-10-CM

## 2022-07-07 DIAGNOSIS — F41.9 ANXIETY AND DEPRESSION: ICD-10-CM

## 2022-07-07 DIAGNOSIS — T50.905A DRUG-INDUCED CYTOPENIA: ICD-10-CM

## 2022-07-07 DIAGNOSIS — Z85.3 HISTORY OF LEFT BREAST CANCER: ICD-10-CM

## 2022-07-07 DIAGNOSIS — F32.A ANXIETY AND DEPRESSION: ICD-10-CM

## 2022-07-07 DIAGNOSIS — Z94.84 HISTORY OF AUTO STEM CELL TRANSPLANT: ICD-10-CM

## 2022-07-07 PROCEDURE — 63600175 PHARM REV CODE 636 W HCPCS: Mod: TB | Performed by: NURSE PRACTITIONER

## 2022-07-07 PROCEDURE — 3008F BODY MASS INDEX DOCD: CPT | Mod: CPTII,S$GLB,, | Performed by: NURSE PRACTITIONER

## 2022-07-07 PROCEDURE — 99215 OFFICE O/P EST HI 40 MIN: CPT | Mod: S$GLB,,, | Performed by: NURSE PRACTITIONER

## 2022-07-07 PROCEDURE — 99215 PR OFFICE/OUTPT VISIT, EST, LEVL V, 40-54 MIN: ICD-10-PCS | Mod: S$GLB,,, | Performed by: NURSE PRACTITIONER

## 2022-07-07 PROCEDURE — 3288F PR FALLS RISK ASSESSMENT DOCUMENTED: ICD-10-PCS | Mod: CPTII,S$GLB,, | Performed by: NURSE PRACTITIONER

## 2022-07-07 PROCEDURE — 1160F PR REVIEW ALL MEDS BY PRESCRIBER/CLIN PHARMACIST DOCUMENTED: ICD-10-PCS | Mod: CPTII,S$GLB,, | Performed by: NURSE PRACTITIONER

## 2022-07-07 PROCEDURE — 1101F PR PT FALLS ASSESS DOC 0-1 FALLS W/OUT INJ PAST YR: ICD-10-PCS | Mod: CPTII,S$GLB,, | Performed by: NURSE PRACTITIONER

## 2022-07-07 PROCEDURE — 99999 PR PBB SHADOW E&M-EST. PATIENT-LVL IV: ICD-10-PCS | Mod: PBBFAC,,, | Performed by: NURSE PRACTITIONER

## 2022-07-07 PROCEDURE — 3077F SYST BP >= 140 MM HG: CPT | Mod: CPTII,S$GLB,, | Performed by: NURSE PRACTITIONER

## 2022-07-07 PROCEDURE — 1126F PR PAIN SEVERITY QUANTIFIED, NO PAIN PRESENT: ICD-10-PCS | Mod: CPTII,S$GLB,, | Performed by: NURSE PRACTITIONER

## 2022-07-07 PROCEDURE — 3044F PR MOST RECENT HEMOGLOBIN A1C LEVEL <7.0%: ICD-10-PCS | Mod: CPTII,S$GLB,, | Performed by: NURSE PRACTITIONER

## 2022-07-07 PROCEDURE — 1159F PR MEDICATION LIST DOCUMENTED IN MEDICAL RECORD: ICD-10-PCS | Mod: CPTII,S$GLB,, | Performed by: NURSE PRACTITIONER

## 2022-07-07 PROCEDURE — 1101F PT FALLS ASSESS-DOCD LE1/YR: CPT | Mod: CPTII,S$GLB,, | Performed by: NURSE PRACTITIONER

## 2022-07-07 PROCEDURE — 3078F DIAST BP <80 MM HG: CPT | Mod: CPTII,S$GLB,, | Performed by: NURSE PRACTITIONER

## 2022-07-07 PROCEDURE — 4010F ACE/ARB THERAPY RXD/TAKEN: CPT | Mod: CPTII,S$GLB,, | Performed by: NURSE PRACTITIONER

## 2022-07-07 PROCEDURE — 3077F PR MOST RECENT SYSTOLIC BLOOD PRESSURE >= 140 MM HG: ICD-10-PCS | Mod: CPTII,S$GLB,, | Performed by: NURSE PRACTITIONER

## 2022-07-07 PROCEDURE — 96401 CHEMO ANTI-NEOPL SQ/IM: CPT

## 2022-07-07 PROCEDURE — 3008F PR BODY MASS INDEX (BMI) DOCUMENTED: ICD-10-PCS | Mod: CPTII,S$GLB,, | Performed by: NURSE PRACTITIONER

## 2022-07-07 PROCEDURE — 1126F AMNT PAIN NOTED NONE PRSNT: CPT | Mod: CPTII,S$GLB,, | Performed by: NURSE PRACTITIONER

## 2022-07-07 PROCEDURE — 3288F FALL RISK ASSESSMENT DOCD: CPT | Mod: CPTII,S$GLB,, | Performed by: NURSE PRACTITIONER

## 2022-07-07 PROCEDURE — 3078F PR MOST RECENT DIASTOLIC BLOOD PRESSURE < 80 MM HG: ICD-10-PCS | Mod: CPTII,S$GLB,, | Performed by: NURSE PRACTITIONER

## 2022-07-07 PROCEDURE — 1159F MED LIST DOCD IN RCRD: CPT | Mod: CPTII,S$GLB,, | Performed by: NURSE PRACTITIONER

## 2022-07-07 PROCEDURE — 4010F PR ACE/ARB THEARPY RXD/TAKEN: ICD-10-PCS | Mod: CPTII,S$GLB,, | Performed by: NURSE PRACTITIONER

## 2022-07-07 PROCEDURE — 3044F HG A1C LEVEL LT 7.0%: CPT | Mod: CPTII,S$GLB,, | Performed by: NURSE PRACTITIONER

## 2022-07-07 PROCEDURE — 99999 PR PBB SHADOW E&M-EST. PATIENT-LVL IV: CPT | Mod: PBBFAC,,, | Performed by: NURSE PRACTITIONER

## 2022-07-07 PROCEDURE — 1160F RVW MEDS BY RX/DR IN RCRD: CPT | Mod: CPTII,S$GLB,, | Performed by: NURSE PRACTITIONER

## 2022-07-07 RX ORDER — EPINEPHRINE 0.3 MG/.3ML
0.3 INJECTION SUBCUTANEOUS ONCE AS NEEDED
Status: DISCONTINUED | OUTPATIENT
Start: 2022-07-07 | End: 2022-07-07 | Stop reason: HOSPADM

## 2022-07-07 RX ORDER — DIPHENHYDRAMINE HYDROCHLORIDE 50 MG/ML
50 INJECTION INTRAMUSCULAR; INTRAVENOUS ONCE AS NEEDED
Status: DISCONTINUED | OUTPATIENT
Start: 2022-07-07 | End: 2022-07-07 | Stop reason: HOSPADM

## 2022-07-07 RX ORDER — DIPHENHYDRAMINE HYDROCHLORIDE 50 MG/ML
50 INJECTION INTRAMUSCULAR; INTRAVENOUS ONCE AS NEEDED
Status: CANCELLED | OUTPATIENT
Start: 2022-07-07

## 2022-07-07 RX ORDER — EPINEPHRINE 0.3 MG/.3ML
0.3 INJECTION SUBCUTANEOUS ONCE AS NEEDED
Status: CANCELLED | OUTPATIENT
Start: 2022-07-07

## 2022-07-07 RX ADMIN — DARATUMUMAB AND HYALURONIDASE-FIHJ (HUMAN RECOMBINANT) 1800 MG: 1800; 30000 INJECTION SUBCUTANEOUS at 09:07

## 2022-07-07 NOTE — PLAN OF CARE
Pt arrived AAOx3, VVS, labs reviewed, orders signed by LEVON Wright NP. Pt doesn't take dex before kenia sq. Pt tolerated injection without issue and was discharged in stable ambulatory condition.

## 2022-07-07 NOTE — PROGRESS NOTES
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  She achieved and tolerated well VRd x completing 7, 28 day cycles and achieving deep VGPR to induction. Then underwent Melphalan autologous stem cell transplant on 2/12/2020.      Extensive PMH as below.    2 prior CVAs (one in 2008, one in 2011)  L breast cancer DCIS stage 0 (s/p lumpectomy and radiation, no endocrine tx due to CVA)  HTN  DM II  Neuropathy, b/l hands  Prior smoker, quit in 2011  Hepatic lesions - vascular per recent MRI in 09 /2019 with no changes; will confirm no further rascon needed from help  Statin Intolerance - on praluent, PCSK9 inhibitor  HFpEF - EF 55%, diastolic dysfunction  Anxiety/Depression     ADMISSION SUMMARY: 2/10-2/26/2020  Admitted 2/10, tolerated chemo and transplant well. Engrafted on day +12. Remained afebrile throughout hospital stay and no mucositis. Experienced expected side effects of nausea and diarrhea controlled with antiemetics and Imodium. Discharged home with home PT/OT       Subjective:       Initial History: Ms. Thompson is a 72 y.o. female who returns for follow up of multiple myeloma in remission. She is 2 year 4 month post AutoSCT.   On kenia/zhanna/dex after biochemical relapse noted approximately 1 year post transplant.  post-stroke cognitive dysfunction, but with progressive decline over the past several years. Her  with multiple health conditiins, so limited help with house work.  Her friend assist her for transportation and appointments. Following by neurologist for cognitive/memory decline. Fingertip neuropathy stable.   Mild  serial increase in biochemical studies over last 3 months with no CRAB. Treatment changed to kenia/velcade to kenia/pom during last visit. Tolerating pomalyst with mild diarrhea and fatigue. Not taking any antidiarrheals.   Presents with her close friend.     Past Medical History:   Past Medical History:   Diagnosis Date    Acute respiratory failure with hypoxia 4/30/2019    FUENTES (acute kidney injury) 5/1/2019    Allergy     Anemia     Aortic aneurysm     Breast cancer 10/2011    left breast Stage 0 DCIS    Cataract     Chronic diastolic congestive heart failure 11/6/2015    Colon polyp     Community acquired pneumonia of right lower lobe of lung 8/3/2021    GERD (gastroesophageal reflux disease)     Hiatal hernia     History of colonic polyps     Hx of psychiatric care     Zoloft    HX: breast cancer     Hyperlipemia     Hypertension     ICH (intracerebral hemorrhage)     Major depressive disorder, single episode, mild 6/23/2016    Nuclear sclerosis 7/21/2014    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 7/21/2014    PAD (peripheral artery disease) 11/6/2015    Psychiatric problem     Statin-induced rhabdomyolysis     4/2015     Stroke 4/2011    Type 2 diabetes mellitus with diabetic peripheral angiopathy without gangrene, with long-term current use of insulin 3/31/2021    Type 2 diabetes mellitus without complication, without long-term current use of insulin 2/10/2020    Walker as ambulation aid        Past Surgical HIstory:   Past Surgical History:   Procedure Laterality Date    APPENDECTOMY      BONE MARROW BIOPSY Left 10/3/2019    Procedure: Biopsy-bone marrow;  Surgeon: Jere Tineo MD;  Location: [address];  Service: Oncology;  Laterality: Left;    BREAST BIOPSY Left 10/2011    BREAST LUMPECTOMY Left 2011    DCIS    COLONOSCOPY      COLONOSCOPY N/A 1/9/2020    Procedure: COLONOSCOPY;  Surgeon: Quang De La Cruz MD;  Location: [facility]  (4TH FLR);  Service: Endoscopy;  Laterality: N/A;    CYST REMOVAL      back    ESOPHAGOGASTRODUODENOSCOPY  07/2016    duodenal angioectasia    ESOPHAGOGASTRODUODENOSCOPY N/A 1/9/2020    Procedure: EGD (ESOPHAGOGASTRODUODENOSCOPY);  Surgeon: Quang De La Cruz MD;  Location: [facility] (4TH FLR);  Service: Endoscopy;  Laterality: N/A;    FOOT FRACTURE SURGERY  10/2012    right foot, with R hallux valgus repair    FRACTURE SURGERY Right     broken toe repiar    HEMORRHOID SURGERY      LIVER BIOPSY  04/2015    essentially normal, no fibrosis    TUBAL LIGATION      UPPER GASTROINTESTINAL ENDOSCOPY         Family History:   Family History   Problem Relation Age of Onset    Dementia Sister         x1 sister    Cancer Sister 63        Lung Cancer    No Known Problems Mother     Cancer Father     No Known Problems Brother     Hypertension Daughter     Fibroids Daughter         uterine    Hypertension Son     No Known Problems Brother     No Known Problems Maternal Aunt     No Known Problems Maternal Uncle     No Known Problems Paternal Aunt     No Known Problems Paternal Uncle     Hypertension Maternal Grandmother     No Known Problems Maternal Grandfather     No Known Problems Paternal Grandmother     No Known Problems Paternal Grandfather     Ovarian cancer Neg Hx     Colon cancer Neg Hx     Tremor Neg Hx     Amblyopia Neg Hx     Blindness Neg Hx     Cataracts Neg Hx     Diabetes Neg Hx     Glaucoma Neg Hx     Macular degeneration Neg Hx     Retinal detachment Neg Hx     Strabismus Neg Hx     Stroke Neg Hx     Thyroid disease Neg Hx     Esophageal cancer Neg Hx     Rectal cancer Neg Hx     Stomach cancer Neg Hx     Ulcerative colitis Neg Hx     Crohn's disease Neg Hx     Irritable bowel syndrome Neg Hx     Celiac disease Neg Hx        Social History:  reports that she quit smoking about 11 years ago. Her smoking use included cigarettes. She started smoking about 55 years ago. She  has a 11.00 pack-year smoking history. She has never used smokeless tobacco. She reports previous alcohol use. She reports that she does not use drugs.    Allergies:  Review of patient's allergies indicates:   Allergen Reactions    Lipitor [atorvastatin] Itching     Itching, elevated cpk, muscle aches, statin induced myositis       Medications:  Current Outpatient Medications   Medication Sig Dispense Refill    acyclovir (ZOVIRAX) 400 MG tablet Take by mouth 2 (two) times daily.      albuterol (ACCUNEB) 1.25 mg/3 mL Nebu Take 3 mLs (1.25 mg total) by nebulization every 6 (six) hours as needed (wheeze/cough). Rescue 75 mL 2    alirocumab (PRALUENT PEN) 75 mg/mL PnIj Inject 1 mL (75 mg total) into the skin every 14 (fourteen) days. 6 mL 3    amLODIPine (NORVASC) 5 MG tablet TAKE 1 TABLET BY MOUTH EVERY DAY 90 tablet 3    aspirin (ECOTRIN) 81 MG EC tablet Take 1 tablet (81 mg total) by mouth once daily. 90 tablet 3    cholecalciferol, vitamin D3, 125 mcg (5,000 unit) Tab 1 tablet DAILY (route: oral)      DARZALEX FASPRO 1,800 mg-30,000 unit/15 mL Soln       ferrous gluconate 324 mg (37.5 mg iron) Tab tablet 1 tablet DAILY (route: oral)      gabapentin (NEURONTIN) 300 MG capsule Take 2 capsules (600 mg total) by mouth 2 (two) times daily. 180 capsule 2    guaiFENesin (MUCINEX) 600 mg 12 hr tablet Take 1 tablet (600 mg total) by mouth 2 (two) times daily. 30 tablet 0    metFORMIN (GLUCOPHAGE) 1000 MG tablet Take 1 tablet (1,000 mg total) by mouth 2 (two) times daily with meals. 180 tablet 3    omeprazole (PRILOSEC) 40 MG capsule TAKE 1 CAPSULE BY MOUTH EVERY DAY 90 capsule 3    OXYGEN-AIR DELIVERY SYSTEMS MISC 1-2 Liter O2 - CONTINUOUS (route: Oxygen)      POMALYST 4 mg Cap TAKE 1 CAPSULE (4MG TOTAL) BY MOUTH EVERY DAY 21 capsule 0    valsartan (DIOVAN) 80 MG tablet Take 1 tablet (80 mg total) by mouth once daily. 90 tablet 3    zoledronic 4 mg/100 mL PgBk infusion       zoledronic acid (ZOMETA) 4 mg/5  mL injection       albuterol (PROVENTIL HFA) 90 mcg/actuation inhaler Inhale 2 puffs into the lungs every 6 (six) hours as needed for Wheezing. Rescue (Patient not taking: Reported on 7/7/2022) 6.7 g 0    benzonatate (TESSALON) 100 MG capsule Take 1 capsule (100 mg total) by mouth 3 (three) times daily as needed for Cough. (Patient not taking: Reported on 7/7/2022) 30 capsule 0    ENGERIX-B, PF, 20 mcg/mL Syrg       GADAVIST 1 mmol/mL (604.72 mg/mL) Soln injection       ondansetron (ZOFRAN-ODT) 4 MG TbDL Take 1 tablet (4 mg total) by mouth every 6 (six) hours as needed (nausea). (Patient not taking: Reported on 7/7/2022) 12 tablet 0    sertraline (ZOLOFT) 50 MG tablet Take 50 mg by mouth once daily.      traMADoL (ULTRAM) 50 mg tablet Take 1 tablet (50 mg total) by mouth 2 (two) times daily as needed for Pain. (Patient not taking: Reported on 7/7/2022) 60 tablet 3     No current facility-administered medications for this visit.     Facility-Administered Medications Ordered in Other Visits   Medication Dose Route Frequency Provider Last Rate Last Admin    diphenhydrAMINE injection 50 mg  50 mg Intravenous Once PRN Adina Wright NP        EPINEPHrine (EPIPEN) 0.3 mg/0.3 mL pen injection 0.3 mg  0.3 mg Intramuscular Once PRN Adina Wright NP        hydrocortisone sodium succinate injection 100 mg  100 mg Intravenous Once PRN Adina Wright NP           Review of Systems   Constitutional: Positive for fatigue. Negative for appetite change, chills and fever.   HENT: Negative for ear discharge, ear pain, nosebleeds, postnasal drip, rhinorrhea, sinus pressure and sore throat.    Eyes: Negative for pain.   Respiratory: Negative for chest tightness and shortness of breath.    Cardiovascular: Negative for chest pain, palpitations and leg swelling.   Gastrointestinal: Negative for abdominal distention, abdominal pain, blood in stool, constipation, diarrhea, nausea and vomiting.   Genitourinary: Negative.   Negative for dysuria and hematuria.   Musculoskeletal: Negative.  Negative for back pain, gait problem and myalgias.   Skin: Negative.    Neurological: Positive for tremors and numbness. Negative for syncope and headaches.   Hematological: Negative for adenopathy. Does not bruise/bleed easily.   Psychiatric/Behavioral: The patient is not nervous/anxious.         Memory issues       ECOG Performance Status: 2 (due to remote  CVA)   Objective:      Vitals:   Vitals:    07/07/22 0732   BP: (!) 141/76   Pulse: (!) 53   Resp: 16   Temp: 98.6 °F (37 °C)        PE:   General -in wheelchair  no apparent distress  HEENT - oropharynx clear  Chest and Lung -bilateral end expiratory wheezes  Cardiovascular - RRR with no MGR, normal S1 and S2  Abdomen-  soft, nontender, no palpable hepatomegaly or splenomegaly  Lymph - no palpable lymphadenopathy  Heme - no bruising, petechiae, pallor  Skin - no rashes or lesions  Psych - appropriate mood and affect  Neuro -  No change in chronic residual CVA symptoms    Laboratory Data:  Lab Visit on 07/07/2022   Component Date Value Ref Range Status    WBC 07/07/2022 2.35 (A) 3.90 - 12.70 K/uL Final    RBC 07/07/2022 3.83 (A) 4.00 - 5.40 M/uL Final    Hemoglobin 07/07/2022 9.6 (A) 12.0 - 16.0 g/dL Final    Hematocrit 07/07/2022 30.8 (A) 37.0 - 48.5 % Final    MCV 07/07/2022 80 (A) 82 - 98 fL Final    MCH 07/07/2022 25.1 (A) 27.0 - 31.0 pg Final    MCHC 07/07/2022 31.2 (A) 32.0 - 36.0 g/dL Final    RDW 07/07/2022 16.5 (A) 11.5 - 14.5 % Final    Platelets 07/07/2022 161  150 - 450 K/uL Final    MPV 07/07/2022 11.9  9.2 - 12.9 fL Final    Immature Granulocytes 07/07/2022 0.4  0.0 - 0.5 % Final    Gran # (ANC) 07/07/2022 1.2 (A) 1.8 - 7.7 K/uL Final    Immature Grans (Abs) 07/07/2022 0.01  0.00 - 0.04 K/uL Final    Comment: Mild elevation in immature granulocytes is non specific and   can be seen in a variety of conditions including stress response,   acute inflammation, trauma and  pregnancy. Correlation with other   laboratory and clinical findings is essential.      Lymph # 07/07/2022 0.7 (A) 1.0 - 4.8 K/uL Final    Mono # 07/07/2022 0.3  0.3 - 1.0 K/uL Final    Eos # 07/07/2022 0.1  0.0 - 0.5 K/uL Final    Baso # 07/07/2022 0.02  0.00 - 0.20 K/uL Final    nRBC 07/07/2022 0  0 /100 WBC Final    Gran % 07/07/2022 49.3  38.0 - 73.0 % Final    Lymph % 07/07/2022 31.1  18.0 - 48.0 % Final    Mono % 07/07/2022 13.2  4.0 - 15.0 % Final    Eosinophil % 07/07/2022 5.1  0.0 - 8.0 % Final    Basophil % 07/07/2022 0.9  0.0 - 1.9 % Final    Differential Method 07/07/2022 Automated   Final    Sodium 07/07/2022 141  136 - 145 mmol/L Final    Potassium 07/07/2022 4.1  3.5 - 5.1 mmol/L Final    Chloride 07/07/2022 107  95 - 110 mmol/L Final    CO2 07/07/2022 27  23 - 29 mmol/L Final    Glucose 07/07/2022 85  70 - 110 mg/dL Final    BUN 07/07/2022 14  8 - 23 mg/dL Final    Creatinine 07/07/2022 0.8  0.5 - 1.4 mg/dL Final    Calcium 07/07/2022 8.9  8.7 - 10.5 mg/dL Final    Total Protein 07/07/2022 7.0  6.0 - 8.4 g/dL Final    Albumin 07/07/2022 3.7  3.5 - 5.2 g/dL Final    Total Bilirubin 07/07/2022 0.6  0.1 - 1.0 mg/dL Final    Comment: For infants and newborns, interpretation of results should be based  on gestational age, weight and in agreement with clinical  observations.    Premature Infant recommended reference ranges:  Up to 24 hours.............<8.0 mg/dL  Up to 48 hours............<12.0 mg/dL  3-5 days..................<15.0 mg/dL  6-29 days.................<15.0 mg/dL      Alkaline Phosphatase 07/07/2022 50 (A) 55 - 135 U/L Final    AST 07/07/2022 14  10 - 40 U/L Final    ALT 07/07/2022 14  10 - 44 U/L Final    Anion Gap 07/07/2022 7 (A) 8 - 16 mmol/L Final    eGFR if African American 07/07/2022 >60.0  >60 mL/min/1.73 m^2 Final    eGFR if non African American 07/07/2022 >60.0  >60 mL/min/1.73 m^2 Final    Comment: Calculation used to obtain the estimated glomerular  filtration  rate (eGFR) is the CKD-EPI equation.       IgG 07/07/2022 1108  650 - 1600 mg/dL Final    IgG Cord Blood Reference Range: 650-1600 mg/dL.    IgA 07/07/2022 55  40 - 350 mg/dL Final    IgA Cord Blood Reference Range: <5 mg/dL.    IgM 07/07/2022 33 (A) 50 - 300 mg/dL Final    IgM Cord Blood Reference Range: <25 mg/dL.    Protein, Serum 07/07/2022 6.8  6.0 - 8.4 g/dL Final    Comment: Serum protein electrophoresis and immunofixation results should be   interpreted in clinical context in that some therapeutic agents can   result   in false positive results (example, daratumumab). Correlation with   the   patient s therapeutic regimen is required.        Lab Results   Component Value Date    WBC 2.35 (L) 07/07/2022    HGB 9.6 (L) 07/07/2022    HCT 30.8 (L) 07/07/2022    MCV 80 (L) 07/07/2022     07/07/2022       Gran # (ANC)   Date Value Ref Range Status   07/07/2022 1.2 (L) 1.8 - 7.7 K/uL Final     Gran %   Date Value Ref Range Status   07/07/2022 49.3 38.0 - 73.0 % Final     Kappa Free Light Chains   Date Value Ref Range Status   06/09/2022 10.64 (H) 0.33 - 1.94 mg/dL Final   05/12/2022 11.03 (H) 0.33 - 1.94 mg/dL Final   04/13/2022 8.09 (H) 0.33 - 1.94 mg/dL Final     Lambda Free Light Chains   Date Value Ref Range Status   06/09/2022 0.88 0.57 - 2.63 mg/dL Final   05/12/2022 1.02 0.57 - 2.63 mg/dL Final   04/13/2022 1.24 0.57 - 2.63 mg/dL Final     Kappa/Lambda FLC Ratio   Date Value Ref Range Status   06/09/2022 12.09 (H) 0.26 - 1.65 Final     Comment:     Undetected antigen excess is a rare event but cannot   be excluded. If these free light chain results do not   agree with other clinical or laboratory findings or   if the sample is from a patient that has previously   demonstrated antigen excess, discuss with the testing   laboratory.   Results should always be interpreted in conjunction   with other laboratory tests and clinical evidence.     05/12/2022 10.81 (H) 0.26 - 1.65 Final      Comment:     Undetected antigen excess is a rare event but cannot   be excluded. If these free light chain results do not   agree with other clinical or laboratory findings or   if the sample is from a patient that has previously   demonstrated antigen excess, discuss with the testing   laboratory.   Results should always be interpreted in conjunction   with other laboratory tests and clinical evidence.     04/13/2022 6.52 (H) 0.26 - 1.65 Final     Comment:     Undetected antigen excess is a rare event but cannot   be excluded. If these free light chain results do not   agree with other clinical or laboratory findings or   if the sample is from a patient that has previously   demonstrated antigen excess, discuss with the testing   laboratory.   Results should always be interpreted in conjunction   with other laboratory tests and clinical evidence.       IgG   Date Value Ref Range Status   07/07/2022 1108 650 - 1600 mg/dL Final     Comment:     IgG Cord Blood Reference Range: 650-1600 mg/dL.     IgA   Date Value Ref Range Status   07/07/2022 55 40 - 350 mg/dL Final     Comment:     IgA Cord Blood Reference Range: <5 mg/dL.     IgM   Date Value Ref Range Status   07/07/2022 33 (L) 50 - 300 mg/dL Final     Comment:     IgM Cord Blood Reference Range: <25 mg/dL.        Imaging:      Assessment:       Ms. Thompson is a 71 year old female with relapsed multiple myeloma.     Plan:     MM s/p Auto SCT  - high risk MM with 17p deletion  - 2 year 4 month  s/p Auto SCT  - started maintenance q2w velcade 5/6/20   - serial biochemical relapse  Noted and  given this and high risk CG , transitioned to Sravanthi/Noble (1mg/m2) /dex  Salvage July 2021  to which she is tolerating; currently sp 11 cycles; presents for cycle 12  - Due to uncontrolled hyperglycemia dex stopped after C4. Continue on sravanthi/noble until progression. Sravanthi q 4 weekly, weekly velcade.  velcade decreased to 1mg/m2 for grade 1 neuropathy and subsequently 0.7mg/m2  "  -supportive meds:   continue ppx acyclovir, asa; resume maintenance zometa q 3 months for 1 year; last dose on 01/22 ,missed next dose. Will schedule with next visit.   -she had demonstrated mild biochemical progression over last 3 biochemical studies with no CRAB, in light of this and high risk CG recommended we transition her therapy from Sravanthi/Velcade to Sravanthi/pomalyst; will even discuss introduction of low dose weekly dex 12-20mg into regimen if no significant improvement in biochemical studies after 1-2 months sravanthi/pom. Treatment changed to Sravanthi/pom on C13.   -already on asa 81mg   -pomalyst tolerating with mild GI issues. Advised patient to take loperamide PRN. Patient will contact BMT clinic for any worsening/new symptoms.  -post transplant vaccines completed     Cytopenia due to chemotherapy  -due to therapy, continue to monitor      Neuropathy  -Stable   - continues gabapentin and prn tramadol  -dose reduced velcade further to 0.7mg/m2  12/16/21) ; no further velcade as above    SOB/descending aortic aneurysm  - CTA and dopplers ordered urgently with high concern for DVT/PE; completed 8/3/20  - incidental descending thoracic aortic aneurysm noted; referred to thoracic surgery; seen 8/7/20; per note: "at current size would not recommend any intervention as risk of rupture remains low.  Recommend surveillance CT chest every 12 months to monitor growth of the aneurysm.  Would typically recommend aspirin 81 mg daily in patients with aortic aneurysm  - ASA started (9/14/20)     HTN  - continue norvasc  - Patient to monitor BP closely  Anxiety/depression  - continue zoloft    DM2  - diet controlled    Hx of CVA  - s/p 2008, 2011    HLD with Statin Intolerance  -on praluent, PCSK9 inhibitor    L breast cancer DCIS stage 0  -s/p lumpectomy and radiation, no endocrine tx due to CVA    Coarse tremors/memory issues  -  Stable coarse tremors   - Short term memory issues worsened  - repeat MRI brain 12/16/21 Multifocal " areas of remote parenchymal hemorrhage similar to prior exam, possibility of underlying cerebral amyloid angiopathy.  - Close f/u with neuro.       BMT Chart Routing      Follow up with physician .  appt or NP appt in 4 weeks. Virtual visit with me in 2 weeks for symtom check.    Follow up with CHRISTY    Infusion scheduling note    Injection scheduling note    Labs    Lab interval:  Cbc, cmp, serum free light chains, quantitative immunoglobulins, serum electropheresis, serum immunofixation, daratumumab injection/zometa infusion     Imaging    Pharmacy appointment    Other referrals        Adina Wright NP  Hematology/Oncology/BMT

## 2022-07-11 ENCOUNTER — TELEPHONE (OUTPATIENT)
Dept: HEMATOLOGY/ONCOLOGY | Facility: CLINIC | Age: 72
End: 2022-07-11
Payer: MEDICARE

## 2022-07-11 NOTE — TELEPHONE ENCOUNTER
----- Message from Drea Sin sent at 7/11/2022 12:43 PM CDT -----  Regarding: Pharmacy calling  Reason for Call:  Need a [employer] Authorization number      Name of caller:  Tonya    Pharmacy name and phone number:    [facility] ([facility]) - [place], [place] - [address]  [address] [address]  Phone: [phone] Fax: [phone]

## 2022-07-15 ENCOUNTER — SPECIALTY PHARMACY (OUTPATIENT)
Dept: PHARMACY | Facility: CLINIC | Age: 72
End: 2022-07-15
Payer: MEDICARE

## 2022-07-18 ENCOUNTER — OFFICE VISIT (OUTPATIENT)
Dept: INTERNAL MEDICINE | Facility: CLINIC | Age: 72
End: 2022-07-18
Payer: MEDICARE

## 2022-07-18 VITALS
HEART RATE: 60 BPM | DIASTOLIC BLOOD PRESSURE: 87 MMHG | SYSTOLIC BLOOD PRESSURE: 138 MMHG | BODY MASS INDEX: 26.64 KG/M2 | HEIGHT: 63 IN | WEIGHT: 150.38 LBS | OXYGEN SATURATION: 98 %

## 2022-07-18 DIAGNOSIS — R25.1 TREMOR: ICD-10-CM

## 2022-07-18 DIAGNOSIS — C90.00 METASTATIC MULTIPLE MYELOMA TO BONE: ICD-10-CM

## 2022-07-18 DIAGNOSIS — I27.9 PULMONARY HEART DISEASE: ICD-10-CM

## 2022-07-18 DIAGNOSIS — C90.02 MULTIPLE MYELOMA IN RELAPSE: ICD-10-CM

## 2022-07-18 DIAGNOSIS — E11.51 TYPE 2 DIABETES MELLITUS WITH DIABETIC PERIPHERAL ANGIOPATHY WITHOUT GANGRENE, WITH LONG-TERM CURRENT USE OF INSULIN: ICD-10-CM

## 2022-07-18 DIAGNOSIS — I10 ESSENTIAL HYPERTENSION: ICD-10-CM

## 2022-07-18 DIAGNOSIS — Z79.4 TYPE 2 DIABETES MELLITUS WITH DIABETIC PERIPHERAL ANGIOPATHY WITHOUT GANGRENE, WITH LONG-TERM CURRENT USE OF INSULIN: ICD-10-CM

## 2022-07-18 DIAGNOSIS — Z78.9 STATIN INTOLERANCE: ICD-10-CM

## 2022-07-18 DIAGNOSIS — Z85.3 PERSONAL HISTORY OF MALIGNANT NEOPLASM OF BREAST: Primary | ICD-10-CM

## 2022-07-18 DIAGNOSIS — E78.00 PURE HYPERCHOLESTEROLEMIA: ICD-10-CM

## 2022-07-18 DIAGNOSIS — I77.1 TORTUOUS AORTA: ICD-10-CM

## 2022-07-18 DIAGNOSIS — I70.0 AORTIC ATHEROSCLEROSIS: ICD-10-CM

## 2022-07-18 PROCEDURE — 99999 PR PBB SHADOW E&M-EST. PATIENT-LVL IV: CPT | Mod: PBBFAC,,, | Performed by: INTERNAL MEDICINE

## 2022-07-18 PROCEDURE — 3044F PR MOST RECENT HEMOGLOBIN A1C LEVEL <7.0%: ICD-10-PCS | Mod: CPTII,S$GLB,, | Performed by: INTERNAL MEDICINE

## 2022-07-18 PROCEDURE — 3044F HG A1C LEVEL LT 7.0%: CPT | Mod: CPTII,S$GLB,, | Performed by: INTERNAL MEDICINE

## 2022-07-18 PROCEDURE — 3079F PR MOST RECENT DIASTOLIC BLOOD PRESSURE 80-89 MM HG: ICD-10-PCS | Mod: CPTII,S$GLB,, | Performed by: INTERNAL MEDICINE

## 2022-07-18 PROCEDURE — 1159F MED LIST DOCD IN RCRD: CPT | Mod: CPTII,S$GLB,, | Performed by: INTERNAL MEDICINE

## 2022-07-18 PROCEDURE — 1101F PR PT FALLS ASSESS DOC 0-1 FALLS W/OUT INJ PAST YR: ICD-10-PCS | Mod: CPTII,S$GLB,, | Performed by: INTERNAL MEDICINE

## 2022-07-18 PROCEDURE — 3077F SYST BP >= 140 MM HG: CPT | Mod: CPTII,S$GLB,, | Performed by: INTERNAL MEDICINE

## 2022-07-18 PROCEDURE — 4010F PR ACE/ARB THEARPY RXD/TAKEN: ICD-10-PCS | Mod: CPTII,S$GLB,, | Performed by: INTERNAL MEDICINE

## 2022-07-18 PROCEDURE — 1126F PR PAIN SEVERITY QUANTIFIED, NO PAIN PRESENT: ICD-10-PCS | Mod: CPTII,S$GLB,, | Performed by: INTERNAL MEDICINE

## 2022-07-18 PROCEDURE — 3079F DIAST BP 80-89 MM HG: CPT | Mod: CPTII,S$GLB,, | Performed by: INTERNAL MEDICINE

## 2022-07-18 PROCEDURE — 3077F PR MOST RECENT SYSTOLIC BLOOD PRESSURE >= 140 MM HG: ICD-10-PCS | Mod: CPTII,S$GLB,, | Performed by: INTERNAL MEDICINE

## 2022-07-18 PROCEDURE — 99999 PR PBB SHADOW E&M-EST. PATIENT-LVL IV: ICD-10-PCS | Mod: PBBFAC,,, | Performed by: INTERNAL MEDICINE

## 2022-07-18 PROCEDURE — 1101F PT FALLS ASSESS-DOCD LE1/YR: CPT | Mod: CPTII,S$GLB,, | Performed by: INTERNAL MEDICINE

## 2022-07-18 PROCEDURE — 3008F BODY MASS INDEX DOCD: CPT | Mod: CPTII,S$GLB,, | Performed by: INTERNAL MEDICINE

## 2022-07-18 PROCEDURE — 1126F AMNT PAIN NOTED NONE PRSNT: CPT | Mod: CPTII,S$GLB,, | Performed by: INTERNAL MEDICINE

## 2022-07-18 PROCEDURE — 4010F ACE/ARB THERAPY RXD/TAKEN: CPT | Mod: CPTII,S$GLB,, | Performed by: INTERNAL MEDICINE

## 2022-07-18 PROCEDURE — 3288F PR FALLS RISK ASSESSMENT DOCUMENTED: ICD-10-PCS | Mod: CPTII,S$GLB,, | Performed by: INTERNAL MEDICINE

## 2022-07-18 PROCEDURE — 1159F PR MEDICATION LIST DOCUMENTED IN MEDICAL RECORD: ICD-10-PCS | Mod: CPTII,S$GLB,, | Performed by: INTERNAL MEDICINE

## 2022-07-18 PROCEDURE — 99214 PR OFFICE/OUTPT VISIT, EST, LEVL IV, 30-39 MIN: ICD-10-PCS | Mod: S$GLB,,, | Performed by: INTERNAL MEDICINE

## 2022-07-18 PROCEDURE — 3008F PR BODY MASS INDEX (BMI) DOCUMENTED: ICD-10-PCS | Mod: CPTII,S$GLB,, | Performed by: INTERNAL MEDICINE

## 2022-07-18 PROCEDURE — 3288F FALL RISK ASSESSMENT DOCD: CPT | Mod: CPTII,S$GLB,, | Performed by: INTERNAL MEDICINE

## 2022-07-18 PROCEDURE — 99214 OFFICE O/P EST MOD 30 MIN: CPT | Mod: S$GLB,,, | Performed by: INTERNAL MEDICINE

## 2022-07-18 RX ORDER — VALSARTAN 160 MG/1
160 TABLET ORAL DAILY
Qty: 90 TABLET | Refills: 3 | Status: SHIPPED | OUTPATIENT
Start: 2022-07-18 | End: 2023-04-18 | Stop reason: SDUPTHER

## 2022-07-18 NOTE — PROGRESS NOTES
This note was generated with [employer] voice recognition software. I apologize for any possible typographical errors.        Subjective:       Patient ID:  Shelby is a 72 y.o. female being seen for follow up 6 weeks.       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  She achieved and tolerated well VRd x completing 7, 28 day cycles and achieving deep VGPR to induction. Then underwent Melphalan autologous stem cell transplant on 2/12/2020.   ( she saw Onc NP 2 weeks ago.  )== Energy is better -- still some fatigue.   SHe is taking an oral medicine 21 out of 28 days.  ( of praluent            72 yo F with multiple myeloma (follows with Heme/Onc, s/p stem cell transplant Feb 2020), T2DM, thrombocytopenia, depression, drug induced neuropathy, chronic diastolic heart failure, hx of stroke with hemiplegia on right side, HLD, anemia, colon polyps who presents for follow up. She is accompanied by her friend.             Fatigue: less fatigue than before,   Increased appetite from before, eats 3 meals per day, consuming ice cream about 3x/week . Sometimes will eat only 2 meals if she is sleeping.  Weight loss/gain:wt stable since last visit       Cough/sore throat: resolved, no dyspnea. She has not used her oxygent very much and wants to call and have them pick it up  Pulse ox on RA was 87.  Can walk without getting short of breath.  Uses walker to get around at the house, sometimes goes out without walker. Ambulating well.      Wt Readings from Last 10 Encounters:  7/18/2022- 150lbs  4/18/2022 150 lbs.    03/08/22 : 69.5 kg (153 lb 3.5 oz)  02/17/22 : 68.6 kg (151 lb 2 oz)  01/27/22 : 69.1 kg (152 lb 5.4 oz)  01/20/22 : 69.1  kg (152 lb 5.4 oz)  01/20/22 : 69.1 kg (152 lb 5.4 oz)  12/17/21 : 72 kg (158 lb 11.7 oz)  12/16/21 : 72 kg (158 lb 9.9 oz)  12/06/21 : 81.2 kg (179 lb)  12/03/21 : 77.4 kg (170 lb 10.2 oz)          Right hand twitching, chronic.   Still able to cook.  helps with med administration and keeping track of blood sugars.   Elevates feet for leg swelling, uses compression stockings.      Anemia: most recent Hgb 9.6   HTN: home /80's   DM: most recent A1c 5.7 . On Metformin 1000 BID. Blood sugar at home 85       HLD with Statin Intolerance: on praluent, PCSK9 inhibitor-- lipid panel form 11/21 reviewed       htn-- has been running higher-- bp 142/88  And 138/87 on recheck  On diavan 80mg a day  Amlodipine 5 mg a day               Review of Systems   Constitutional: Negative for fever.   HENT: Negative for sore throat.    Eyes: Negative for blurred vision.   Respiratory: Negative for cough and shortness of breath.    Cardiovascular: Positive for leg swelling. Negative for chest pain.   Gastrointestinal: Negative for abdominal pain, constipation, diarrhea, nausea and vomiting.   Genitourinary: Negative for dysuria.   Musculoskeletal: Positive for myalgias.   Neurological: Negative for headaches.   Psychiatric/Behavioral: Negative for depression.               Patient Active Problem List   Diagnosis    Personal history of malignant neoplasm of breast    Mononeuritis of upper limb    Hyperlipemia    Gastropathy    Tremor    Nuclear sclerosis    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    Thoracic aortic aneurysm without rupture    Aortic atherosclerosis    History of CVA with residual deficit    Osteopenia    PAD (peripheral artery disease)    Liver mass    Recurrent major depressive disorder, in full remission    Statin intolerance    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)    Pulmonary heart disease    Ectatic aorta    Tortuous aorta    Acute  "respiratory failure with hypoxia    Budd-Chiari syndrome    PVC (premature ventricular contraction)    Multiple myeloma in relapse    Metastatic multiple myeloma to bone    Status post autologous bone marrow transplant    Abnormal PFT    Infection due to human metapneumovirus (hMPV)    Iron deficiency anemia due to chronic blood loss    History of colon polyps    Drug-induced polyneuropathy    Essential hypertension    Type 2 diabetes mellitus without complication, without long-term current use of insulin    Multiple myeloma in remission    Anemia in neoplastic disease    Hemiplegia and hemiparesis following cerebral infarction affecting right dominant side    Abnormal findings on diagnostic imaging of liver and biliary tract     Type 2 diabetes mellitus with diabetic peripheral angiopathy without gangrene, with long-term current use of insulin    History of auto stem cell transplant    Community acquired pneumonia of right lower lobe of lung    Thrombocytopenia, unspecified    Chronic obstructive pulmonary disease, unspecified COPD type    Mild neurocognitive disorder due to multiple etiologies             Objective:      BP (!) 142/88 (BP Location: Left arm, Patient Position: Sitting, BP Method: Medium (Manual))   Pulse 60   Ht 5' 3" (1.6 m)   Wt 68.2 kg (150 lb 5.7 oz)   LMP  (LMP Unknown)   SpO2 98%   BMI 26.63 kg/m²      Physical Exam  Off o2-   Constitutional:       Appearance: Normal appearance.      Comments: Pleasant, in good spirits   HENT:      Head: Normocephalic and atraumatic.      Nose: Nose normal.      Mouth/Throat:      Mouth: Mucous membranes are moist.   Eyes:      General: No scleral icterus.     Conjunctiva/sclera: Conjunctivae normal.   Cardiovascular:      Rate and Rhythm: Normal rate and regular rhythm.      Pulses: Normal pulses.      Heart sounds: Normal heart sounds. No murmur heard.  Pulmonary:      Effort: Pulmonary effort is normal. No respiratory distress. "      Breath sounds: Normal breath sounds. No wheezing or rales.   Abdominal:      General: Abdomen is flat. There is no distension.      Palpations: Abdomen is soft.      Tenderness: There is no abdominal tenderness. There is no guarding.   Musculoskeletal:         General: No swelling, tenderness or deformity.      Cervical back: Normal range of motion. No rigidity.      Right lower leg: No edema.      Left lower leg: No edema.    twitchy right arms--         Skin:     General: Skin is warm and dry.      Coloration: Skin is not jaundiced.   Neurological:      General: No focal deficit present.      Mental Status: She is alert and oriented to person, place, and time.   Psychiatric:         Mood and Affect: Mood normal.         Behavior: Behavior normal.             Assessment and Plan:             Type 2 diabetes mellitus without complication- doing well.       Multiple myeloma - being treated-- following wht ONC-      Metastatic multiple myeloma to bone     Pulmonary heart disease     Type 2 diabetes mellitus with diabetic peripheral angiopathy without gangrene, with long-term current use of insulin     Thrombocytopenia, unspecified     Multiple myeloma in remission     Anemia in neoplastic disease     History of auto stem cell transplant     Chronic obstructive pulmonary disease, unspecified COPD type     From CT on 4/25/2022  Was reviewed        Stable appearance of saccular aneurysmal dilatation at the distal thoracic aorta near the level of diaphragm with associated mural thrombus not significantly changed.        Plan:  Last visit I recommended that she stay on her o2 24/7 but she is off and not using o2-- o2 sat is 98.   She has COPD with a lot of emphysematous changes of her lungs and she has pulmonary heart disease.  I believe the oxygen would be beneficial to work to decrease her risk of right heart failure.  Discussed      On review of her recent ct of her chest/abdomen --  saccular aneurysm she has in  her thoracic aorta - stable at 4.5 cm - and the lung consolidation has resolved.   .     Her weight is dustin   Her glucose is doing much better since stopping  steroids for her chemo.  If we need to add steroids to her chemo we can alter her diabetes treatment so that this is more controllable.  I will see her back again in 3 months or sooner if needed.

## 2022-07-22 NOTE — TELEPHONE ENCOUNTER
Specialty Pharmacy - Refill Coordination    Specialty Medication Orders Linked to Encounter    Flowsheet Row Most Recent Value   Medication #1 alirocumab (PRALUENT PEN) 75 mg/mL PnIj (Order#[id number], Rx#[id number])          Refill Questions - Documented Responses    Flowsheet Row Most Recent Value   Patient Availability and HIPAA Verification    Does patient want to proceed with activity? Yes   HIPAA/medical authority confirmed? Yes   Relationship to patient of person spoken to? Self   Refill Screening Questions    Would patient like to speak to a pharmacist? No   When does the patient need to receive the medication? 08/01/22   Refill Delivery Questions    How will the patient receive the medication? Delivery Laura   When does the patient need to receive the medication? 08/01/22   Shipping Address Home   Address in [facility] confirmed and updated if neccessary? Yes   Expected Copay ($) 0   Is the patient able to afford the medication copay? Yes   Payment Method zero copay   Days supply of Refill 84   Supplies needed? No supplies needed   Refill activity completed? Yes   Refill activity plan Refill scheduled   Shipment/Pickup Date: 07/26/22          Current Outpatient Medications   Medication Sig    acyclovir (ZOVIRAX) 400 MG tablet Take by mouth 2 (two) times daily.    albuterol (ACCUNEB) 1.25 mg/3 mL Nebu Take 3 mLs (1.25 mg total) by nebulization every 6 (six) hours as needed (wheeze/cough). Rescue    alirocumab (PRALUENT PEN) 75 mg/mL PnIj Inject 1 mL (75 mg total) into the skin every 14 (fourteen) days.    amLODIPine (NORVASC) 5 MG tablet TAKE 1 TABLET BY MOUTH EVERY DAY    aspirin (ECOTRIN) 81 MG EC tablet Take 1 tablet (81 mg total) by mouth once daily.    cholecalciferol, vitamin D3, 125 mcg (5,000 unit) Tab 1 tablet DAILY (route: oral)    DARZALEX FASPRO 1,800 mg-30,000 unit/15 mL Soln     ENGERIX-B, PF, 20 mcg/mL Syrg     ferrous gluconate 324 mg (37.5 mg iron) Tab tablet 1 tablet DAILY  (route: oral)    gabapentin (NEURONTIN) 300 MG capsule Take 2 capsules (600 mg total) by mouth 2 (two) times daily.    GADAVIST 1 mmol/mL (604.72 mg/mL) Soln injection     guaiFENesin (MUCINEX) 600 mg 12 hr tablet Take 1 tablet (600 mg total) by mouth 2 (two) times daily.    metFORMIN (GLUCOPHAGE) 1000 MG tablet Take 1 tablet (1,000 mg total) by mouth 2 (two) times daily with meals.    omeprazole (PRILOSEC) 40 MG capsule TAKE 1 CAPSULE BY MOUTH EVERY DAY    OXYGEN-AIR DELIVERY SYSTEMS MISC 1-2 Liter O2 - CONTINUOUS (route: Oxygen)    POMALYST 4 mg Cap TAKE 1 CAPSULE (4MG TOTAL) BY MOUTH EVERY DAY    sertraline (ZOLOFT) 50 MG tablet Take 50 mg by mouth once daily.    valsartan (DIOVAN) 160 MG tablet Take 1 tablet (160 mg total) by mouth once daily.    zoledronic 4 mg/100 mL PgBk infusion     zoledronic acid (ZOMETA) 4 mg/5 mL injection    Last reviewed on 7/18/2022  9:05 AM by Mary Lancaster MA    Review of patient's allergies indicates:   Allergen Reactions    Lipitor [atorvastatin] Itching     Itching, elevated cpk, muscle aches, statin induced myositis    Last reviewed on  7/18/2022 9:05 AM by Mary Lancaster      Tasks added this encounter   10/13/2022 - Refill Call (Auto Added)   Tasks due within next 3 months   No tasks due.     Nichol Gonzalez, PharmD  [facility]  [address] [place] [id number]  Phone: [phone]  Fax: [phone]

## 2022-07-27 ENCOUNTER — OFFICE VISIT (OUTPATIENT)
Dept: HEMATOLOGY/ONCOLOGY | Facility: CLINIC | Age: 72
End: 2022-07-27
Payer: MEDICARE

## 2022-07-27 DIAGNOSIS — R19.7 DIARRHEA, UNSPECIFIED TYPE: ICD-10-CM

## 2022-07-27 DIAGNOSIS — C90.00 MULTIPLE MYELOMA, REMISSION STATUS UNSPECIFIED: Primary | ICD-10-CM

## 2022-07-27 PROCEDURE — 99214 OFFICE O/P EST MOD 30 MIN: CPT | Mod: 95,,, | Performed by: INTERNAL MEDICINE

## 2022-07-27 PROCEDURE — 3044F PR MOST RECENT HEMOGLOBIN A1C LEVEL <7.0%: ICD-10-PCS | Mod: CPTII,95,, | Performed by: INTERNAL MEDICINE

## 2022-07-27 PROCEDURE — 4010F ACE/ARB THERAPY RXD/TAKEN: CPT | Mod: CPTII,95,, | Performed by: INTERNAL MEDICINE

## 2022-07-27 PROCEDURE — 99214 PR OFFICE/OUTPT VISIT, EST, LEVL IV, 30-39 MIN: ICD-10-PCS | Mod: 95,,, | Performed by: INTERNAL MEDICINE

## 2022-07-27 PROCEDURE — 4010F PR ACE/ARB THEARPY RXD/TAKEN: ICD-10-PCS | Mod: CPTII,95,, | Performed by: INTERNAL MEDICINE

## 2022-07-27 PROCEDURE — 3044F HG A1C LEVEL LT 7.0%: CPT | Mod: CPTII,95,, | Performed by: INTERNAL MEDICINE

## 2022-07-27 RX ORDER — DIPHENHYDRAMINE HYDROCHLORIDE 50 MG/ML
50 INJECTION INTRAMUSCULAR; INTRAVENOUS ONCE AS NEEDED
Status: CANCELLED | OUTPATIENT
Start: 2022-08-04

## 2022-07-27 RX ORDER — EPINEPHRINE 0.3 MG/.3ML
0.3 INJECTION SUBCUTANEOUS ONCE AS NEEDED
Status: CANCELLED | OUTPATIENT
Start: 2022-08-04

## 2022-07-27 NOTE — PROGRESS NOTES
The patient location is: home  The chief complaint leading to consultation is: MM, symptoms check     Visit type: audio only; pt stated attempted to get video visit portal to work multiple times and would not work    Face to Face time with patient: 10  15  minutes of total time spent on the encounter, which includes face to face time and non-face to face time preparing to see the patient (eg, review of tests), Obtaining and/or reviewing separately obtained history, Documenting clinical information in the electronic or other health record, Independently interpreting results (not separately reported) and communicating results to the patient/family/caregiver, or Care coordination (not separately reported).         Each patient to whom he or she provides medical services by telemedicine is:  (1) informed of the relationship between the physician and patient and the respective role of any other health care provider with respect to management of the patient; and (2) notified that he or she may decline to receive medical services by telemedicine and may withdraw from such care at any time.    Notes:   72 y.o. female; high risk RR MM; currently on salvage Sravanthi/Pom/Dex; responding well as of 7/7/22 biochemical studies and mild therapy induced cytopenias not requiring dose adjustments.  At last appt endorsed pomalyst induced diarrhea. This appt scheduled to fu on these symptoms. They have resolved with prn imodium so can continue.      FU: -proceed with sravanthi on 8/4/22   -please cancel 8/12/22 appt with Adina  -cbc, cmp, serum free light chains, quantitative immunoglobulins, serum electropheresis, serum immunofixation  and NP appt prior to treatment on 9/1  -cbc, cmp, serum free light chains, quantitative immunoglobulins, serum electropheresis, serum immunofixation and appt with me and sravanthi injection  on 9/29

## 2022-07-27 NOTE — Clinical Note
-please cancel 8/12/22 appt with Adina -cbc, cmp, serum free light chains, quantitative immunoglobulins, serum electropheresis, serum immunofixation  and NP appt prior to treatment on 9/1

## 2022-07-27 NOTE — Clinical Note
Also please schedule -cbc, cmp, serum free light chains, quantitative immunoglobulins, serum electropheresis, serum immunofixation lab and appt with me and kenia injection  on 9/29

## 2022-08-04 ENCOUNTER — INFUSION (OUTPATIENT)
Dept: INFUSION THERAPY | Facility: HOSPITAL | Age: 72
End: 2022-08-04
Payer: MEDICARE

## 2022-08-04 ENCOUNTER — TELEPHONE (OUTPATIENT)
Dept: HEMATOLOGY/ONCOLOGY | Facility: CLINIC | Age: 72
End: 2022-08-04
Payer: MEDICARE

## 2022-08-04 VITALS
WEIGHT: 150.81 LBS | BODY MASS INDEX: 26.72 KG/M2 | HEIGHT: 63 IN | HEART RATE: 70 BPM | DIASTOLIC BLOOD PRESSURE: 73 MMHG | RESPIRATION RATE: 18 BRPM | TEMPERATURE: 98 F | SYSTOLIC BLOOD PRESSURE: 144 MMHG

## 2022-08-04 DIAGNOSIS — Z94.81 STATUS POST AUTOLOGOUS BONE MARROW TRANSPLANT: Primary | ICD-10-CM

## 2022-08-04 DIAGNOSIS — Z94.84 HISTORY OF AUTO STEM CELL TRANSPLANT: ICD-10-CM

## 2022-08-04 DIAGNOSIS — C90.00 MULTIPLE MYELOMA, REMISSION STATUS UNSPECIFIED: ICD-10-CM

## 2022-08-04 DIAGNOSIS — C90.02 MULTIPLE MYELOMA IN RELAPSE: ICD-10-CM

## 2022-08-04 PROCEDURE — 25000003 PHARM REV CODE 250: Performed by: INTERNAL MEDICINE

## 2022-08-04 PROCEDURE — 96374 THER/PROPH/DIAG INJ IV PUSH: CPT

## 2022-08-04 PROCEDURE — 63600175 PHARM REV CODE 636 W HCPCS: Performed by: INTERNAL MEDICINE

## 2022-08-04 RX ORDER — ZOLEDRONIC ACID 0.04 MG/ML
4 INJECTION, SOLUTION INTRAVENOUS
Status: COMPLETED | OUTPATIENT
Start: 2022-08-04 | End: 2022-08-04

## 2022-08-04 RX ORDER — POMALIDOMIDE 4 MG/1
1 CAPSULE ORAL DAILY
Qty: 21 CAPSULE | Refills: 0 | Status: SHIPPED | OUTPATIENT
Start: 2022-08-04 | End: 2022-08-30 | Stop reason: SDUPTHER

## 2022-08-04 RX ADMIN — ZOLEDRONIC ACID 4 MG: 0.04 INJECTION, SOLUTION INTRAVENOUS at 02:08

## 2022-08-04 RX ADMIN — SODIUM CHLORIDE: 9 INJECTION, SOLUTION INTRAVENOUS at 01:08

## 2022-08-04 NOTE — PLAN OF CARE
Pt tolerated Zometa today. Darzalex SQ was held today due to pt ANC 0.7 per MD Odonnell. NAD. PIV flushed and removed. AVS given to pt. Discharged home. Ambulated independently with family.   Problem: Adult Inpatient Plan of Care  Goal: Optimal Comfort and Wellbeing  Intervention: Provide Person-Centered Care  Flowsheets (Taken 8/4/2022 4358)  Trust Relationship/Rapport:   care explained   thoughts/feelings acknowledged   choices provided   emotional support provided   empathic listening provided   questions answered   reassurance provided   questions encouraged

## 2022-08-17 ENCOUNTER — PES CALL (OUTPATIENT)
Dept: ADMINISTRATIVE | Facility: CLINIC | Age: 72
End: 2022-08-17
Payer: MEDICARE

## 2022-08-23 NOTE — TELEPHONE ENCOUNTER
Care Due:                  Date            Visit Type   Department     Provider  --------------------------------------------------------------------------------                                EP -                              PRIMARY      [facility] INTERNAL  Last Visit: 07-      CARE ([facility])   ASHLI Arevalo                               -                              PRIMARY      [facility] INTERNAL  Next Visit: 01-      CARE ([facility])   ASHLI Arevalo                                                            Last  Test          Frequency    Reason                     Performed    Due Date  --------------------------------------------------------------------------------    HBA1C.......  6 months...  metFORMIN................  03- 09-    Health [facility] Embedded Care Gaps. Reference number: [id number]. 8/23/2022   12:20:54 AM CDT

## 2022-08-23 NOTE — TELEPHONE ENCOUNTER
Refill Routing Note   Medication(s) are not appropriate for processing by [facility] for the following reason(s):      - Required vitals are abnormal    [facility] action(s):  Defer Medication-related problems identified: Requires labs        Medication reconciliation completed: No     Appointments  past 12m or future 3m with PCP    Date Provider   Last Visit   7/18/2022 Emanuel Arevalo Jr., MD   Next Visit   1/17/2023 Emanuel Arevalo Jr., MD   ED visits in past 90 days: 0        Note composed:6:30 AM 08/23/2022

## 2022-08-24 ENCOUNTER — PES CALL (OUTPATIENT)
Dept: ADMINISTRATIVE | Facility: CLINIC | Age: 72
End: 2022-08-24
Payer: MEDICARE

## 2022-08-24 RX ORDER — AMLODIPINE BESYLATE 5 MG/1
TABLET ORAL
Qty: 90 TABLET | Refills: 0 | Status: SHIPPED | OUTPATIENT
Start: 2022-08-24 | End: 2022-11-18

## 2022-08-30 DIAGNOSIS — Z94.84 HISTORY OF AUTO STEM CELL TRANSPLANT: ICD-10-CM

## 2022-08-30 DIAGNOSIS — C90.00 MULTIPLE MYELOMA, REMISSION STATUS UNSPECIFIED: ICD-10-CM

## 2022-08-30 RX ORDER — POMALIDOMIDE 4 MG/1
1 CAPSULE ORAL DAILY
Qty: 21 CAPSULE | Refills: 0 | Status: SHIPPED | OUTPATIENT
Start: 2022-08-30 | End: 2022-09-23

## 2022-09-01 ENCOUNTER — OFFICE VISIT (OUTPATIENT)
Dept: HEMATOLOGY/ONCOLOGY | Facility: CLINIC | Age: 72
End: 2022-09-01
Payer: MEDICARE

## 2022-09-01 ENCOUNTER — LAB VISIT (OUTPATIENT)
Dept: LAB | Facility: HOSPITAL | Age: 72
End: 2022-09-01
Payer: MEDICARE

## 2022-09-01 ENCOUNTER — INFUSION (OUTPATIENT)
Dept: INFUSION THERAPY | Facility: HOSPITAL | Age: 72
End: 2022-09-01
Payer: MEDICARE

## 2022-09-01 VITALS
TEMPERATURE: 98 F | RESPIRATION RATE: 18 BRPM | SYSTOLIC BLOOD PRESSURE: 163 MMHG | HEART RATE: 50 BPM | DIASTOLIC BLOOD PRESSURE: 72 MMHG | OXYGEN SATURATION: 93 %

## 2022-09-01 VITALS
RESPIRATION RATE: 16 BRPM | HEIGHT: 63 IN | SYSTOLIC BLOOD PRESSURE: 167 MMHG | BODY MASS INDEX: 27.13 KG/M2 | OXYGEN SATURATION: 93 % | DIASTOLIC BLOOD PRESSURE: 77 MMHG | WEIGHT: 153.13 LBS | HEART RATE: 53 BPM

## 2022-09-01 DIAGNOSIS — C80.1 NEUROPATHY ASSOCIATED WITH CANCER: ICD-10-CM

## 2022-09-01 DIAGNOSIS — C90.00 MULTIPLE MYELOMA, REMISSION STATUS UNSPECIFIED: ICD-10-CM

## 2022-09-01 DIAGNOSIS — C90.01 MULTIPLE MYELOMA IN REMISSION: Primary | ICD-10-CM

## 2022-09-01 DIAGNOSIS — G25.2 COARSE TREMORS: ICD-10-CM

## 2022-09-01 DIAGNOSIS — Z94.84 HISTORY OF AUTO STEM CELL TRANSPLANT: ICD-10-CM

## 2022-09-01 DIAGNOSIS — I10 ESSENTIAL HYPERTENSION: ICD-10-CM

## 2022-09-01 DIAGNOSIS — G63 NEUROPATHY ASSOCIATED WITH CANCER: ICD-10-CM

## 2022-09-01 DIAGNOSIS — F41.9 ANXIETY AND DEPRESSION: ICD-10-CM

## 2022-09-01 DIAGNOSIS — F32.A ANXIETY AND DEPRESSION: ICD-10-CM

## 2022-09-01 DIAGNOSIS — R14.0 ABDOMINAL BLOATING: ICD-10-CM

## 2022-09-01 DIAGNOSIS — E11.9 TYPE 2 DIABETES MELLITUS WITHOUT COMPLICATION, WITHOUT LONG-TERM CURRENT USE OF INSULIN: ICD-10-CM

## 2022-09-01 DIAGNOSIS — E78.00 PURE HYPERCHOLESTEROLEMIA: ICD-10-CM

## 2022-09-01 DIAGNOSIS — T50.905A DRUG-INDUCED CYTOPENIA: ICD-10-CM

## 2022-09-01 DIAGNOSIS — D84.9 IMMUNOCOMPROMISED: ICD-10-CM

## 2022-09-01 DIAGNOSIS — C90.02 MULTIPLE MYELOMA IN RELAPSE: ICD-10-CM

## 2022-09-01 DIAGNOSIS — C90.00 MULTIPLE MYELOMA, REMISSION STATUS UNSPECIFIED: Primary | ICD-10-CM

## 2022-09-01 DIAGNOSIS — I69.30 HISTORY OF CVA WITH RESIDUAL DEFICIT: ICD-10-CM

## 2022-09-01 DIAGNOSIS — D75.9 DRUG-INDUCED CYTOPENIA: ICD-10-CM

## 2022-09-01 LAB
ALBUMIN SERPL BCP-MCNC: 3.7 G/DL (ref 3.5–5.2)
ALP SERPL-CCNC: 61 U/L (ref 55–135)
ALT SERPL W/O P-5'-P-CCNC: 9 U/L (ref 10–44)
ANION GAP SERPL CALC-SCNC: 10 MMOL/L (ref 8–16)
ANISOCYTOSIS BLD QL SMEAR: SLIGHT
AST SERPL-CCNC: 13 U/L (ref 10–40)
BASOPHILS # BLD AUTO: 0.06 K/UL (ref 0–0.2)
BASOPHILS NFR BLD: 2.6 % (ref 0–1.9)
BILIRUB SERPL-MCNC: 0.6 MG/DL (ref 0.1–1)
BUN SERPL-MCNC: 11 MG/DL (ref 8–23)
CALCIUM SERPL-MCNC: 9.5 MG/DL (ref 8.7–10.5)
CHLORIDE SERPL-SCNC: 107 MMOL/L (ref 95–110)
CO2 SERPL-SCNC: 28 MMOL/L (ref 23–29)
CREAT SERPL-MCNC: 0.9 MG/DL (ref 0.5–1.4)
DIFFERENTIAL METHOD: ABNORMAL
EOSINOPHIL # BLD AUTO: 0.3 K/UL (ref 0–0.5)
EOSINOPHIL NFR BLD: 12.8 % (ref 0–8)
ERYTHROCYTE [DISTWIDTH] IN BLOOD BY AUTOMATED COUNT: 17.7 % (ref 11.5–14.5)
EST. GFR  (NO RACE VARIABLE): >60 ML/MIN/1.73 M^2
GLUCOSE SERPL-MCNC: 82 MG/DL (ref 70–110)
HCT VFR BLD AUTO: 31.1 % (ref 37–48.5)
HGB BLD-MCNC: 9.1 G/DL (ref 12–16)
HYPOCHROMIA BLD QL SMEAR: ABNORMAL
IGA SERPL-MCNC: 108 MG/DL (ref 40–350)
IGG SERPL-MCNC: 1263 MG/DL (ref 650–1600)
IGM SERPL-MCNC: 29 MG/DL (ref 50–300)
IMM GRANULOCYTES # BLD AUTO: 0.01 K/UL (ref 0–0.04)
IMM GRANULOCYTES NFR BLD AUTO: 0.4 % (ref 0–0.5)
LYMPHOCYTES # BLD AUTO: 0.8 K/UL (ref 1–4.8)
LYMPHOCYTES NFR BLD: 33.9 % (ref 18–48)
MCH RBC QN AUTO: 23.2 PG (ref 27–31)
MCHC RBC AUTO-ENTMCNC: 29.3 G/DL (ref 32–36)
MCV RBC AUTO: 79 FL (ref 82–98)
MONOCYTES # BLD AUTO: 0.3 K/UL (ref 0.3–1)
MONOCYTES NFR BLD: 11.9 % (ref 4–15)
NEUTROPHILS # BLD AUTO: 0.9 K/UL (ref 1.8–7.7)
NEUTROPHILS NFR BLD: 38.4 % (ref 38–73)
NRBC BLD-RTO: 0 /100 WBC
OVALOCYTES BLD QL SMEAR: ABNORMAL
PLATELET # BLD AUTO: 193 K/UL (ref 150–450)
PLATELET BLD QL SMEAR: ABNORMAL
PMV BLD AUTO: 10.2 FL (ref 9.2–12.9)
POIKILOCYTOSIS BLD QL SMEAR: SLIGHT
POLYCHROMASIA BLD QL SMEAR: ABNORMAL
POTASSIUM SERPL-SCNC: 4 MMOL/L (ref 3.5–5.1)
PROT SERPL-MCNC: 7.3 G/DL (ref 6–8.4)
RBC # BLD AUTO: 3.92 M/UL (ref 4–5.4)
SCHISTOCYTES BLD QL SMEAR: ABNORMAL
SCHISTOCYTES BLD QL SMEAR: PRESENT
SODIUM SERPL-SCNC: 145 MMOL/L (ref 136–145)
TARGETS BLD QL SMEAR: ABNORMAL
WBC # BLD AUTO: 2.27 K/UL (ref 3.9–12.7)

## 2022-09-01 PROCEDURE — 3008F PR BODY MASS INDEX (BMI) DOCUMENTED: ICD-10-PCS | Mod: CPTII,S$GLB,, | Performed by: NURSE PRACTITIONER

## 2022-09-01 PROCEDURE — 1159F PR MEDICATION LIST DOCUMENTED IN MEDICAL RECORD: ICD-10-PCS | Mod: CPTII,S$GLB,, | Performed by: NURSE PRACTITIONER

## 2022-09-01 PROCEDURE — 1160F PR REVIEW ALL MEDS BY PRESCRIBER/CLIN PHARMACIST DOCUMENTED: ICD-10-PCS | Mod: CPTII,S$GLB,, | Performed by: NURSE PRACTITIONER

## 2022-09-01 PROCEDURE — 82784 ASSAY IGA/IGD/IGG/IGM EACH: CPT | Mod: 59 | Performed by: INTERNAL MEDICINE

## 2022-09-01 PROCEDURE — 84165 PATHOLOGIST INTERPRETATION SPE: ICD-10-PCS | Mod: 26,,, | Performed by: PATHOLOGY

## 2022-09-01 PROCEDURE — 3288F FALL RISK ASSESSMENT DOCD: CPT | Mod: CPTII,S$GLB,, | Performed by: NURSE PRACTITIONER

## 2022-09-01 PROCEDURE — 3288F PR FALLS RISK ASSESSMENT DOCUMENTED: ICD-10-PCS | Mod: CPTII,S$GLB,, | Performed by: NURSE PRACTITIONER

## 2022-09-01 PROCEDURE — 1160F RVW MEDS BY RX/DR IN RCRD: CPT | Mod: CPTII,S$GLB,, | Performed by: NURSE PRACTITIONER

## 2022-09-01 PROCEDURE — 36415 COLL VENOUS BLD VENIPUNCTURE: CPT | Performed by: INTERNAL MEDICINE

## 2022-09-01 PROCEDURE — 1159F MED LIST DOCD IN RCRD: CPT | Mod: CPTII,S$GLB,, | Performed by: NURSE PRACTITIONER

## 2022-09-01 PROCEDURE — 1126F PR PAIN SEVERITY QUANTIFIED, NO PAIN PRESENT: ICD-10-PCS | Mod: CPTII,S$GLB,, | Performed by: NURSE PRACTITIONER

## 2022-09-01 PROCEDURE — 86334 IMMUNOFIX E-PHORESIS SERUM: CPT | Mod: 26,,, | Performed by: PATHOLOGY

## 2022-09-01 PROCEDURE — 99215 OFFICE O/P EST HI 40 MIN: CPT | Mod: S$GLB,,, | Performed by: NURSE PRACTITIONER

## 2022-09-01 PROCEDURE — 1126F AMNT PAIN NOTED NONE PRSNT: CPT | Mod: CPTII,S$GLB,, | Performed by: NURSE PRACTITIONER

## 2022-09-01 PROCEDURE — 63600175 PHARM REV CODE 636 W HCPCS: Mod: TB | Performed by: INTERNAL MEDICINE

## 2022-09-01 PROCEDURE — 84165 PROTEIN E-PHORESIS SERUM: CPT | Mod: 26,,, | Performed by: PATHOLOGY

## 2022-09-01 PROCEDURE — 1101F PR PT FALLS ASSESS DOC 0-1 FALLS W/OUT INJ PAST YR: ICD-10-PCS | Mod: CPTII,S$GLB,, | Performed by: NURSE PRACTITIONER

## 2022-09-01 PROCEDURE — 1101F PT FALLS ASSESS-DOCD LE1/YR: CPT | Mod: CPTII,S$GLB,, | Performed by: NURSE PRACTITIONER

## 2022-09-01 PROCEDURE — 4010F ACE/ARB THERAPY RXD/TAKEN: CPT | Mod: CPTII,S$GLB,, | Performed by: NURSE PRACTITIONER

## 2022-09-01 PROCEDURE — 84165 PROTEIN E-PHORESIS SERUM: CPT | Performed by: INTERNAL MEDICINE

## 2022-09-01 PROCEDURE — 83520 IMMUNOASSAY QUANT NOS NONAB: CPT | Performed by: INTERNAL MEDICINE

## 2022-09-01 PROCEDURE — 86334 IMMUNOFIX E-PHORESIS SERUM: CPT | Performed by: INTERNAL MEDICINE

## 2022-09-01 PROCEDURE — 3044F PR MOST RECENT HEMOGLOBIN A1C LEVEL <7.0%: ICD-10-PCS | Mod: CPTII,S$GLB,, | Performed by: NURSE PRACTITIONER

## 2022-09-01 PROCEDURE — 99215 PR OFFICE/OUTPT VISIT, EST, LEVL V, 40-54 MIN: ICD-10-PCS | Mod: S$GLB,,, | Performed by: NURSE PRACTITIONER

## 2022-09-01 PROCEDURE — 3008F BODY MASS INDEX DOCD: CPT | Mod: CPTII,S$GLB,, | Performed by: NURSE PRACTITIONER

## 2022-09-01 PROCEDURE — 3078F DIAST BP <80 MM HG: CPT | Mod: CPTII,S$GLB,, | Performed by: NURSE PRACTITIONER

## 2022-09-01 PROCEDURE — 3078F PR MOST RECENT DIASTOLIC BLOOD PRESSURE < 80 MM HG: ICD-10-PCS | Mod: CPTII,S$GLB,, | Performed by: NURSE PRACTITIONER

## 2022-09-01 PROCEDURE — 96401 CHEMO ANTI-NEOPL SQ/IM: CPT

## 2022-09-01 PROCEDURE — 80053 COMPREHEN METABOLIC PANEL: CPT | Performed by: INTERNAL MEDICINE

## 2022-09-01 PROCEDURE — 86334 PATHOLOGIST INTERPRETATION IFE: ICD-10-PCS | Mod: 26,,, | Performed by: PATHOLOGY

## 2022-09-01 PROCEDURE — 3077F PR MOST RECENT SYSTOLIC BLOOD PRESSURE >= 140 MM HG: ICD-10-PCS | Mod: CPTII,S$GLB,, | Performed by: NURSE PRACTITIONER

## 2022-09-01 PROCEDURE — 4010F PR ACE/ARB THEARPY RXD/TAKEN: ICD-10-PCS | Mod: CPTII,S$GLB,, | Performed by: NURSE PRACTITIONER

## 2022-09-01 PROCEDURE — 85025 COMPLETE CBC W/AUTO DIFF WBC: CPT | Performed by: INTERNAL MEDICINE

## 2022-09-01 PROCEDURE — 3044F HG A1C LEVEL LT 7.0%: CPT | Mod: CPTII,S$GLB,, | Performed by: NURSE PRACTITIONER

## 2022-09-01 PROCEDURE — 3077F SYST BP >= 140 MM HG: CPT | Mod: CPTII,S$GLB,, | Performed by: NURSE PRACTITIONER

## 2022-09-01 RX ORDER — SIMETHICONE 80 MG
80 TABLET,CHEWABLE ORAL EVERY 6 HOURS PRN
Qty: 30 TABLET | Refills: 0 | Status: SHIPPED | OUTPATIENT
Start: 2022-09-01 | End: 2022-10-28 | Stop reason: ALTCHOICE

## 2022-09-01 RX ADMIN — DARATUMUMAB AND HYALURONIDASE-FIHJ (HUMAN RECOMBINANT) 1800 MG: 1800; 30000 INJECTION SUBCUTANEOUS at 11:09

## 2022-09-01 NOTE — PROGRESS NOTES
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  She achieved and tolerated well VRd x completing 7, 28 day cycles and achieving deep VGPR to induction. Then underwent Melphalan autologous stem cell transplant on 2/12/2020.      Extensive PMH as below.    2 prior CVAs (one in 2008, one in 2011)  L breast cancer DCIS stage 0 (s/p lumpectomy and radiation, no endocrine tx due to CVA)  HTN  DM II  Neuropathy, b/l hands  Prior smoker, quit in 2011  Hepatic lesions - vascular per recent MRI in 09 /2019 with no changes; will confirm no further rascon needed from help  Statin Intolerance - on praluent, PCSK9 inhibitor  HFpEF - EF 55%, diastolic dysfunction  Anxiety/Depression     ADMISSION SUMMARY: 2/10-2/26/2020  Admitted 2/10, tolerated chemo and transplant well. Engrafted on day +12. Remained afebrile throughout hospital stay and no mucositis. Experienced expected side effects of nausea and diarrhea controlled with antiemetics and Imodium. Discharged home with home PT/OT       Subjective:       Initial History: Ms. Thompson is a 72 y.o. female who returns for follow up of multiple myeloma in remission. She is 2 year 6 month post AutoSCT.   On kenia/zhanna/dex after biochemical relapse noted approximately 1 year post transplant.  post-stroke cognitive dysfunction, but with progressive decline over the past several years. Her  with multiple health conditiins, so limited help with house work.  Her friend assist her for transportation and appointments. Following by neurologist for cognitive/memory decline. Fingertip neuropathy stable.   Mild  serial increase in biochemical studies over last 3 months with no CRAB. Treatment changed to kenia/velcade to kenia/pom  Tolerating treatment well. Previously reported chemo induced diarrhea controlled with PRN imodium. Abdominal bloating following chemo days. Missed last month chemo due to scheduling discrepancy.    Presents with her close friend.     Past Medical History:   Past Medical History:   Diagnosis Date    Acute respiratory failure with hypoxia 4/30/2019    FUENTES (acute kidney injury) 5/1/2019    Allergy     Anemia     Aortic aneurysm     Breast cancer 10/2011    left breast Stage 0 DCIS    Cataract     Chronic diastolic congestive heart failure 11/6/2015    Colon polyp     Community acquired pneumonia of right lower lobe of lung 8/3/2021    GERD (gastroesophageal reflux disease)     Hiatal hernia     History of colonic polyps     Hx of psychiatric care     Zoloft    HX: breast cancer     Hyperlipemia     Hypertension     ICH (intracerebral hemorrhage)     Major depressive disorder, single episode, mild 6/23/2016    Nuclear sclerosis 7/21/2014    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 7/21/2014    PAD (peripheral artery disease) 11/6/2015    Psychiatric problem     Statin-induced rhabdomyolysis     4/2015     Stroke 4/2011    Type 2 diabetes mellitus with diabetic peripheral angiopathy without gangrene, with long-term current use of insulin 3/31/2021    Type 2 diabetes mellitus without complication, without long-term current use of insulin 2/10/2020    Walker as ambulation aid        Past Surgical HIstory:   Past Surgical History:   Procedure Laterality Date    APPENDECTOMY      BONE MARROW BIOPSY Left 10/3/2019    Procedure: Biopsy-bone marrow;  Surgeon: Jere Tineo MD;  Location: [facility] OR [address] [place] [address];  Service: Oncology;  Laterality: Left;    BREAST BIOPSY Left 10/2011    BREAST LUMPECTOMY Left 2011    DCIS    COLONOSCOPY      COLONOSCOPY N/A 1/9/2020    Procedure:  COLONOSCOPY;  Surgeon: Quang De La Cruz MD;  [facility]  Service: Endoscopy;  Laterality: N/A;    CYST REMOVAL      back    ESOPHAGOGASTRODUODENOSCOPY  07/2016    duodenal angioectasia    ESOPHAGOGASTRODUODENOSCOPY N/A 1/9/2020    Procedure: EGD (ESOPHAGOGASTRODUODENOSCOPY);  Surgeon: Quang De La Cruz MD;  Location: [facility]  Service: Endoscopy;  Laterality: N/A;    FOOT FRACTURE SURGERY  10/2012    right foot, with R hallux valgus repair    FRACTURE SURGERY Right     broken toe repiar    HEMORRHOID SURGERY      LIVER BIOPSY  04/2015    essentially normal, no fibrosis    TUBAL LIGATION      UPPER GASTROINTESTINAL ENDOSCOPY         Family History:   Family History   Problem Relation Age of Onset    Dementia Sister         x1 sister    Cancer Sister 63        Lung Cancer    No Known Problems Mother     Cancer Father     No Known Problems Brother     Hypertension Daughter     Fibroids Daughter         uterine    Hypertension Son     No Known Problems Brother     No Known Problems Maternal Aunt     No Known Problems Maternal Uncle     No Known Problems Paternal Aunt     No Known Problems Paternal Uncle     Hypertension Maternal Grandmother     No Known Problems Maternal Grandfather     No Known Problems Paternal Grandmother     No Known Problems Paternal Grandfather     Ovarian cancer Neg Hx     Colon cancer Neg Hx     Tremor Neg Hx     Amblyopia Neg Hx     Blindness Neg Hx     Cataracts Neg Hx     Diabetes Neg Hx     Glaucoma Neg Hx     Macular degeneration Neg Hx     Retinal detachment Neg Hx     Strabismus Neg Hx     Stroke Neg Hx     Thyroid disease Neg Hx     Esophageal cancer Neg Hx     Rectal cancer Neg Hx     Stomach cancer Neg Hx     Ulcerative colitis Neg Hx     Crohn's disease Neg Hx     Irritable bowel syndrome Neg Hx     Celiac disease Neg Hx        Social History:  reports that she quit smoking about 11 years ago. Her smoking use included cigarettes. She started smoking  about 55 years ago. She has a 11.00 pack-year smoking history. She has never used smokeless tobacco. She reports that she does not currently use alcohol. She reports that she does not use drugs.    Allergies:  Review of patient's allergies indicates:   Allergen Reactions    Lipitor [atorvastatin] Itching     Itching, elevated cpk, muscle aches, statin induced myositis       Medications:  Current Outpatient Medications   Medication Sig Dispense Refill    acyclovir (ZOVIRAX) 400 MG tablet Take by mouth 2 (two) times daily.      albuterol (ACCUNEB) 1.25 mg/3 mL Nebu Take 3 mLs (1.25 mg total) by nebulization every 6 (six) hours as needed (wheeze/cough). Rescue 75 mL 2    alirocumab (PRALUENT PEN) 75 mg/mL PnIj Inject 1 mL (75 mg total) into the skin every 14 (fourteen) days. 6 mL 3    amLODIPine (NORVASC) 5 MG tablet TAKE 1 TABLET BY MOUTH EVERY DAY 90 tablet 0    aspirin (ECOTRIN) 81 MG EC tablet Take 1 tablet (81 mg total) by mouth once daily. 90 tablet 3    cholecalciferol, vitamin D3, 125 mcg (5,000 unit) Tab 1 tablet DAILY (route: oral)      DARZALEX FASPRO 1,800 mg-30,000 unit/15 mL Soln       ENGERIX-B, PF, 20 mcg/mL Syrg       ferrous gluconate 324 mg (37.5 mg iron) Tab tablet 1 tablet DAILY (route: oral)      gabapentin (NEURONTIN) 300 MG capsule Take 2 capsules (600 mg total) by mouth 2 (two) times daily. 180 capsule 2    GADAVIST 1 mmol/mL (604.72 mg/mL) Soln injection       metFORMIN (GLUCOPHAGE) 1000 MG tablet Take 1 tablet (1,000 mg total) by mouth 2 (two) times daily with meals. 180 tablet 0    omeprazole (PRILOSEC) 40 MG capsule TAKE 1 CAPSULE BY MOUTH EVERY DAY 90 capsule 3    OXYGEN-AIR DELIVERY SYSTEMS MISC 1-2 Liter O2 - CONTINUOUS (route: Oxygen)      pomalidomide (POMALYST) 4 mg Cap Take 1 capsule (4 mg total) by mouth once daily. 21 capsule 0    sertraline (ZOLOFT) 50 MG tablet Take 50 mg by mouth once daily.      valsartan (DIOVAN) 160 MG tablet Take 1 tablet (160 mg total) by mouth once daily.  90 tablet 3    zoledronic 4 mg/100 mL PgBk infusion       zoledronic acid (ZOMETA) 4 mg/5 mL injection       simethicone (MYLICON) 80 MG chewable tablet Take 1 tablet (80 mg total) by mouth every 6 (six) hours as needed for Flatulence. 30 tablet 0     No current facility-administered medications for this visit.       Review of Systems   Constitutional:  Positive for fatigue. Negative for appetite change, chills and fever.   HENT:  Negative for ear discharge, ear pain, nosebleeds, postnasal drip, rhinorrhea, sinus pressure and sore throat.    Eyes:  Negative for pain.   Respiratory:  Negative for chest tightness and shortness of breath.    Cardiovascular:  Negative for chest pain, palpitations and leg swelling.   Gastrointestinal:  Negative for abdominal distention, abdominal pain, blood in stool, constipation, diarrhea, nausea and vomiting.   Genitourinary: Negative.  Negative for dysuria and hematuria.   Musculoskeletal: Negative.  Negative for back pain, gait problem and myalgias.   Skin: Negative.    Neurological:  Positive for tremors and numbness. Negative for syncope and headaches.   Hematological:  Negative for adenopathy. Does not bruise/bleed easily.   Psychiatric/Behavioral:  The patient is not nervous/anxious.         Memory issues     ECOG Performance Status: 2 (due to remote  CVA)   Objective:      Vitals:   Vitals:    09/01/22 1004   BP: (!) 167/77   Pulse: (!) 53   Resp: 16        PE:   General -in wheelchair  no apparent distress  HEENT - oropharynx clear  Chest and Lung -bilateral end expiratory wheezes  Cardiovascular - RRR with no MGR, normal S1 and S2  Abdomen-  soft, nontender, no palpable hepatomegaly or splenomegaly  Lymph - no palpable lymphadenopathy  Heme - no bruising, petechiae, pallor  Skin - no rashes or lesions  Psych - appropriate mood and affect  Neuro -  No change in chronic residual CVA symptoms    Laboratory Data:  Lab Visit on 09/01/2022   Component Date Value Ref Range Status     WBC 09/01/2022 2.27 (L)  3.90 - 12.70 K/uL Final    RBC 09/01/2022 3.92 (L)  4.00 - 5.40 M/uL Final    Hemoglobin 09/01/2022 9.1 (L)  12.0 - 16.0 g/dL Final    Hematocrit 09/01/2022 31.1 (L)  37.0 - 48.5 % Final    MCV 09/01/2022 79 (L)  82 - 98 fL Final    MCH 09/01/2022 23.2 (L)  27.0 - 31.0 pg Final    MCHC 09/01/2022 29.3 (L)  32.0 - 36.0 g/dL Final    RDW 09/01/2022 17.7 (H)  11.5 - 14.5 % Final    Platelets 09/01/2022 193  150 - 450 K/uL Final    MPV 09/01/2022 10.2  9.2 - 12.9 fL Final    Immature Granulocytes 09/01/2022 0.4  0.0 - 0.5 % Final    Gran # (ANC) 09/01/2022 0.9 (L)  1.8 - 7.7 K/uL Final    Immature Grans (Abs) 09/01/2022 0.01  0.00 - 0.04 K/uL Final    Comment: Mild elevation in immature granulocytes is non specific and   can be seen in a variety of conditions including stress response,   acute inflammation, trauma and pregnancy. Correlation with other   laboratory and clinical findings is essential.      Lymph # 09/01/2022 0.8 (L)  1.0 - 4.8 K/uL Final    Mono # 09/01/2022 0.3  0.3 - 1.0 K/uL Final    Eos # 09/01/2022 0.3  0.0 - 0.5 K/uL Final    Baso # 09/01/2022 0.06  0.00 - 0.20 K/uL Final    nRBC 09/01/2022 0  0 /100 WBC Final    Gran % 09/01/2022 38.4  38.0 - 73.0 % Final    Lymph % 09/01/2022 33.9  18.0 - 48.0 % Final    Mono % 09/01/2022 11.9  4.0 - 15.0 % Final    Eosinophil % 09/01/2022 12.8 (H)  0.0 - 8.0 % Final    Basophil % 09/01/2022 2.6 (H)  0.0 - 1.9 % Final    Platelet Estimate 09/01/2022 Appears normal   Final    Aniso 09/01/2022 Slight   Final    Poik 09/01/2022 Slight   Final    Poly 09/01/2022 Occasional   Final    Hypo 09/01/2022 Occasional   Final    Ovalocytes 09/01/2022 Occasional   Final    Target Cells 09/01/2022 Occasional   Final    Schistocytes 09/01/2022 Present   Final    Fragmented Cells 09/01/2022 Occasional   Final    Differential Method 09/01/2022 Automated   Final    Sodium 09/01/2022 145  136 - 145 mmol/L Final    Potassium 09/01/2022 4.0  3.5 - 5.1 mmol/L  Final    Chloride 09/01/2022 107  95 - 110 mmol/L Final    CO2 09/01/2022 28  23 - 29 mmol/L Final    Glucose 09/01/2022 82  70 - 110 mg/dL Final    BUN 09/01/2022 11  8 - 23 mg/dL Final    Creatinine 09/01/2022 0.9  0.5 - 1.4 mg/dL Final    Calcium 09/01/2022 9.5  8.7 - 10.5 mg/dL Final    Total Protein 09/01/2022 7.3  6.0 - 8.4 g/dL Final    Albumin 09/01/2022 3.7  3.5 - 5.2 g/dL Final    Total Bilirubin 09/01/2022 0.6  0.1 - 1.0 mg/dL Final    Comment: For infants and newborns, interpretation of results should be based  on gestational age, weight and in agreement with clinical  observations.    Premature Infant recommended reference ranges:  Up to 24 hours.............<8.0 mg/dL  Up to 48 hours............<12.0 mg/dL  3-5 days..................<15.0 mg/dL  6-29 days.................<15.0 mg/dL      Alkaline Phosphatase 09/01/2022 61  55 - 135 U/L Final    AST 09/01/2022 13  10 - 40 U/L Final    ALT 09/01/2022 9 (L)  10 - 44 U/L Final    Anion Gap 09/01/2022 10  8 - 16 mmol/L Final    eGFR 09/01/2022 >60.0  >60 mL/min/1.73 m^2 Final    IgG 09/01/2022 1263  650 - 1600 mg/dL Final    IgG Cord Blood Reference Range: 650-1600 mg/dL.    IgA 09/01/2022 108  40 - 350 mg/dL Final    IgA Cord Blood Reference Range: <5 mg/dL.    IgM 09/01/2022 29 (L)  50 - 300 mg/dL Final    IgM Cord Blood Reference Range: <25 mg/dL.    Protein, Serum 09/01/2022 7.1  6.0 - 8.4 g/dL Final    Comment: Serum protein electrophoresis and immunofixation results should be   interpreted in clinical context in that some therapeutic agents can   result   in false positive results (example, daratumumab). Correlation with   the   patient s therapeutic regimen is required.        Lab Results   Component Value Date    WBC 2.27 (L) 09/01/2022    HGB 9.1 (L) 09/01/2022    HCT 31.1 (L) 09/01/2022    MCV 79 (L) 09/01/2022     09/01/2022       Gran # (ANC)   Date Value Ref Range Status   09/01/2022 0.9 (L) 1.8 - 7.7 K/uL Final     Gran %   Date Value  Ref Range Status   09/01/2022 38.4 38.0 - 73.0 % Final     Kappa Free Light Chains   Date Value Ref Range Status   08/04/2022 10.66 (H) 0.33 - 1.94 mg/dL Final   07/07/2022 8.75 (H) 0.33 - 1.94 mg/dL Final   06/09/2022 10.64 (H) 0.33 - 1.94 mg/dL Final     Lambda Free Light Chains   Date Value Ref Range Status   08/04/2022 2.56 0.57 - 2.63 mg/dL Final   07/07/2022 2.08 0.57 - 2.63 mg/dL Final   06/09/2022 0.88 0.57 - 2.63 mg/dL Final     Kappa/Lambda FLC Ratio   Date Value Ref Range Status   08/04/2022 4.16 (H) 0.26 - 1.65 Final     Comment:     Undetected antigen excess is a rare event but cannot   be excluded. If these free light chain results do not   agree with other clinical or laboratory findings or   if the sample is from a patient that has previously   demonstrated antigen excess, discuss with the testing   laboratory.   Results should always be interpreted in conjunction   with other laboratory tests and clinical evidence.     07/07/2022 4.21 (H) 0.26 - 1.65 Final     Comment:     Undetected antigen excess is a rare event but cannot   be excluded. If these free light chain results do not   agree with other clinical or laboratory findings or   if the sample is from a patient that has previously   demonstrated antigen excess, discuss with the testing   laboratory.   Results should always be interpreted in conjunction   with other laboratory tests and clinical evidence.     06/09/2022 12.09 (H) 0.26 - 1.65 Final     Comment:     Undetected antigen excess is a rare event but cannot   be excluded. If these free light chain results do not   agree with other clinical or laboratory findings or   if the sample is from a patient that has previously   demonstrated antigen excess, discuss with the testing   laboratory.   Results should always be interpreted in conjunction   with other laboratory tests and clinical evidence.       IgG   Date Value Ref Range Status   09/01/2022 1263 650 - 1600 mg/dL Final     Comment:      IgG Cord Blood Reference Range: 650-1600 mg/dL.     IgA   Date Value Ref Range Status   09/01/2022 108 40 - 350 mg/dL Final     Comment:     IgA Cord Blood Reference Range: <5 mg/dL.     IgM   Date Value Ref Range Status   09/01/2022 29 (L) 50 - 300 mg/dL Final     Comment:     IgM Cord Blood Reference Range: <25 mg/dL.        Imaging:      Assessment:       Ms. Thompson is a 71 year old female with relapsed multiple myeloma.     Plan:     MM s/p Auto SCT  - high risk MM with 17p deletion  - 2 year 6 month  s/p Auto SCT  - started maintenance q2w velcade 5/6/20   - serial biochemical relapse  Noted and  given this and high risk CG , transitioned to Sravanthi/Noble (1mg/m2) /dex  Salvage July 2021  to which she is tolerating; currently sp 11 cycles; presents for cycle 12  - Due to uncontrolled hyperglycemia dex stopped after C4. Continue on sravanthi/noble until progression. Sravanthi q 4 weekly, weekly velcade.  velcade decreased to 1mg/m2 for grade 1 neuropathy and subsequently 0.7mg/m2   -supportive meds:   continue ppx acyclovir, asa; resume maintenance zometa q 3 months for 1 year; last dose on 08/04/22, next dose on 11/22.   -she had demonstrated mild biochemical progression over last 3 biochemical studies with no CRAB, in light of this and high risk CG recommended we transition her therapy from Sravanthi/Velcade to Sravanthi/pomalyst; will even discuss introduction of low dose weekly dex 12-20mg into regimen if no significant improvement in biochemical studies after 1-2 months sravanthi/pom. Treatment changed to Sravanthi/pom on C13. Last month (C14) treatment not received due to scheduling issue.   -already on asa 81mg   -pomalyst tolerating with mild GI issues occasionally.   -post transplant vaccines completed     Cytopenia due to chemotherapy  -due to therapy, continue to monitor    - Continue to monitor    Neuropathy  -Stable   - continues gabapentin and prn tramadol  -dose reduced velcade further to 0.7mg/m2  12/16/21) ; no further velcade as  "above  Stable now  SOB/descending aortic aneurysm  - CTA and dopplers ordered urgently with high concern for DVT/PE; completed 8/3/20  - incidental descending thoracic aortic aneurysm noted; referred to thoracic surgery; seen 8/7/20; per note: "at current size would not recommend any intervention as risk of rupture remains low.  Recommend surveillance CT chest every 12 months to monitor growth of the aneurysm.  Would typically recommend aspirin 81 mg daily in patients with aortic aneurysm  - ASA started (9/14/20)     HTN  - continue norvasc  - Patient to monitor BP closely  Anxiety/depression  - continue zoloft    DM2  - diet controlled    Hx of CVA  - s/p 2008, 2011    HLD with Statin Intolerance  -on praluent, PCSK9 inhibitor    L breast cancer DCIS stage 0  -s/p lumpectomy and radiation, no endocrine tx due to CVA    Coarse tremors/memory issues  -  Stable coarse tremors   - Short term memory issues worsened  - repeat MRI brain 12/16/21 Multifocal areas of remote parenchymal hemorrhage similar to prior exam, possibility of underlying cerebral amyloid angiopathy.  - Close f/u with neuro.       BMT Chart Routing      Follow up with physician .  appt or NP appt in 4 weeks.   Follow up with CHRISTY    Infusion scheduling note    Injection scheduling note    Labs   Lab interval:  Cbc, cmp, serum free light chains, quantitative immunoglobulins, serum electropheresis, serum immunofixation, daratumumab injection infusion   Imaging    Pharmacy appointment    Other referrals      Adina Wright NP  Hematology/Oncology/BMT        "

## 2022-09-01 NOTE — PLAN OF CARE
Pt tolerated Darzalex SQ to abdomen today. Zometa not given today per KISHOR Wright pt is not due pt to get infusion A2owldfl NAD. AVS given to pt. Discharged home. Ambulated independently with family.   Problem: Adult Inpatient Plan of Care  Goal: Optimal Comfort and Wellbeing  Intervention: Provide Person-Centered Care  Flowsheets (Taken 9/1/2022 1107)  Trust Relationship/Rapport:   care explained   choices provided   thoughts/feelings acknowledged   emotional support provided   empathic listening provided   questions answered   questions encouraged   reassurance provided

## 2022-09-02 LAB
ALBUMIN SERPL ELPH-MCNC: 3.76 G/DL (ref 3.35–5.55)
ALPHA1 GLOB SERPL ELPH-MCNC: 0.37 G/DL (ref 0.17–0.41)
ALPHA2 GLOB SERPL ELPH-MCNC: 1.02 G/DL (ref 0.43–0.99)
B-GLOBULIN SERPL ELPH-MCNC: 0.81 G/DL (ref 0.5–1.1)
GAMMA GLOB SERPL ELPH-MCNC: 1.14 G/DL (ref 0.67–1.58)
INTERPRETATION SERPL IFE-IMP: NORMAL
KAPPA LC SER QL IA: 11.94 MG/DL (ref 0.33–1.94)
KAPPA LC/LAMBDA SER IA: 4.54 (ref 0.26–1.65)
LAMBDA LC SER QL IA: 2.63 MG/DL (ref 0.57–2.63)
PATHOLOGIST INTERPRETATION IFE: NORMAL
PATHOLOGIST INTERPRETATION SPE: NORMAL
PROT SERPL-MCNC: 7.1 G/DL (ref 6–8.4)

## 2022-09-26 ENCOUNTER — TELEPHONE (OUTPATIENT)
Dept: HEMATOLOGY/ONCOLOGY | Facility: CLINIC | Age: 72
End: 2022-09-26
Payer: MEDICARE

## 2022-09-26 NOTE — TELEPHONE ENCOUNTER
"----- Message from Seamus Quevedo sent at 9/26/2022 11:39 AM CDT -----  Consult/Advisory:          Name Of Caller: [facility]       Contact Preference?:  [phone]    Fax: [phone]      Provider Name: Noman      Does patient feel the need to be seen today? No      What is the nature of the call?: Requesting [employer] for POMALYST 4 mg Cap          Additional Notes:  "Thank you for all that you do for our patients"      "

## 2022-09-29 ENCOUNTER — OFFICE VISIT (OUTPATIENT)
Dept: HEMATOLOGY/ONCOLOGY | Facility: CLINIC | Age: 72
End: 2022-09-29
Payer: MEDICARE

## 2022-09-29 ENCOUNTER — INFUSION (OUTPATIENT)
Dept: INFUSION THERAPY | Facility: HOSPITAL | Age: 72
End: 2022-09-29
Payer: MEDICARE

## 2022-09-29 VITALS
SYSTOLIC BLOOD PRESSURE: 147 MMHG | DIASTOLIC BLOOD PRESSURE: 71 MMHG | RESPIRATION RATE: 18 BRPM | BODY MASS INDEX: 28.2 KG/M2 | HEIGHT: 62 IN | TEMPERATURE: 98 F | OXYGEN SATURATION: 95 % | WEIGHT: 153.25 LBS | HEART RATE: 62 BPM

## 2022-09-29 VITALS
DIASTOLIC BLOOD PRESSURE: 70 MMHG | OXYGEN SATURATION: 99 % | HEART RATE: 68 BPM | RESPIRATION RATE: 18 BRPM | SYSTOLIC BLOOD PRESSURE: 138 MMHG | TEMPERATURE: 98 F

## 2022-09-29 DIAGNOSIS — C90.02 MULTIPLE MYELOMA IN RELAPSE: ICD-10-CM

## 2022-09-29 DIAGNOSIS — Z94.84 HISTORY OF AUTO STEM CELL TRANSPLANT: ICD-10-CM

## 2022-09-29 DIAGNOSIS — D75.9 DRUG-INDUCED CYTOPENIA: ICD-10-CM

## 2022-09-29 DIAGNOSIS — D64.9 ANEMIA, UNSPECIFIED TYPE: Primary | ICD-10-CM

## 2022-09-29 DIAGNOSIS — C90.01 MULTIPLE MYELOMA IN REMISSION: Primary | ICD-10-CM

## 2022-09-29 DIAGNOSIS — C90.00 MULTIPLE MYELOMA, REMISSION STATUS UNSPECIFIED: ICD-10-CM

## 2022-09-29 DIAGNOSIS — T50.905A DRUG-INDUCED CYTOPENIA: ICD-10-CM

## 2022-09-29 PROCEDURE — 3077F PR MOST RECENT SYSTOLIC BLOOD PRESSURE >= 140 MM HG: ICD-10-PCS | Mod: CPTII,S$GLB,, | Performed by: INTERNAL MEDICINE

## 2022-09-29 PROCEDURE — 3288F PR FALLS RISK ASSESSMENT DOCUMENTED: ICD-10-PCS | Mod: CPTII,S$GLB,, | Performed by: INTERNAL MEDICINE

## 2022-09-29 PROCEDURE — 3077F SYST BP >= 140 MM HG: CPT | Mod: CPTII,S$GLB,, | Performed by: INTERNAL MEDICINE

## 2022-09-29 PROCEDURE — 3078F PR MOST RECENT DIASTOLIC BLOOD PRESSURE < 80 MM HG: ICD-10-PCS | Mod: CPTII,S$GLB,, | Performed by: INTERNAL MEDICINE

## 2022-09-29 PROCEDURE — 99215 OFFICE O/P EST HI 40 MIN: CPT | Mod: S$GLB,,, | Performed by: INTERNAL MEDICINE

## 2022-09-29 PROCEDURE — 3008F PR BODY MASS INDEX (BMI) DOCUMENTED: ICD-10-PCS | Mod: CPTII,S$GLB,, | Performed by: INTERNAL MEDICINE

## 2022-09-29 PROCEDURE — 96401 CHEMO ANTI-NEOPL SQ/IM: CPT

## 2022-09-29 PROCEDURE — 1101F PT FALLS ASSESS-DOCD LE1/YR: CPT | Mod: CPTII,S$GLB,, | Performed by: INTERNAL MEDICINE

## 2022-09-29 PROCEDURE — 99999 PR PBB SHADOW E&M-EST. PATIENT-LVL V: CPT | Mod: PBBFAC,,, | Performed by: INTERNAL MEDICINE

## 2022-09-29 PROCEDURE — 99215 PR OFFICE/OUTPT VISIT, EST, LEVL V, 40-54 MIN: ICD-10-PCS | Mod: S$GLB,,, | Performed by: INTERNAL MEDICINE

## 2022-09-29 PROCEDURE — 1126F PR PAIN SEVERITY QUANTIFIED, NO PAIN PRESENT: ICD-10-PCS | Mod: CPTII,S$GLB,, | Performed by: INTERNAL MEDICINE

## 2022-09-29 PROCEDURE — 1159F MED LIST DOCD IN RCRD: CPT | Mod: CPTII,S$GLB,, | Performed by: INTERNAL MEDICINE

## 2022-09-29 PROCEDURE — 3288F FALL RISK ASSESSMENT DOCD: CPT | Mod: CPTII,S$GLB,, | Performed by: INTERNAL MEDICINE

## 2022-09-29 PROCEDURE — 3008F BODY MASS INDEX DOCD: CPT | Mod: CPTII,S$GLB,, | Performed by: INTERNAL MEDICINE

## 2022-09-29 PROCEDURE — 99999 PR PBB SHADOW E&M-EST. PATIENT-LVL V: ICD-10-PCS | Mod: PBBFAC,,, | Performed by: INTERNAL MEDICINE

## 2022-09-29 PROCEDURE — 63600175 PHARM REV CODE 636 W HCPCS: Mod: TB | Performed by: INTERNAL MEDICINE

## 2022-09-29 PROCEDURE — 1126F AMNT PAIN NOTED NONE PRSNT: CPT | Mod: CPTII,S$GLB,, | Performed by: INTERNAL MEDICINE

## 2022-09-29 PROCEDURE — 3078F DIAST BP <80 MM HG: CPT | Mod: CPTII,S$GLB,, | Performed by: INTERNAL MEDICINE

## 2022-09-29 PROCEDURE — 4010F ACE/ARB THERAPY RXD/TAKEN: CPT | Mod: CPTII,S$GLB,, | Performed by: INTERNAL MEDICINE

## 2022-09-29 PROCEDURE — 3044F HG A1C LEVEL LT 7.0%: CPT | Mod: CPTII,S$GLB,, | Performed by: INTERNAL MEDICINE

## 2022-09-29 PROCEDURE — 1101F PR PT FALLS ASSESS DOC 0-1 FALLS W/OUT INJ PAST YR: ICD-10-PCS | Mod: CPTII,S$GLB,, | Performed by: INTERNAL MEDICINE

## 2022-09-29 PROCEDURE — 4010F PR ACE/ARB THEARPY RXD/TAKEN: ICD-10-PCS | Mod: CPTII,S$GLB,, | Performed by: INTERNAL MEDICINE

## 2022-09-29 PROCEDURE — 3044F PR MOST RECENT HEMOGLOBIN A1C LEVEL <7.0%: ICD-10-PCS | Mod: CPTII,S$GLB,, | Performed by: INTERNAL MEDICINE

## 2022-09-29 PROCEDURE — 1159F PR MEDICATION LIST DOCUMENTED IN MEDICAL RECORD: ICD-10-PCS | Mod: CPTII,S$GLB,, | Performed by: INTERNAL MEDICINE

## 2022-09-29 RX ORDER — EPINEPHRINE 0.3 MG/.3ML
0.3 INJECTION SUBCUTANEOUS ONCE AS NEEDED
Status: CANCELLED | OUTPATIENT
Start: 2022-09-29

## 2022-09-29 RX ORDER — DIPHENHYDRAMINE HYDROCHLORIDE 50 MG/ML
50 INJECTION INTRAMUSCULAR; INTRAVENOUS ONCE AS NEEDED
Status: CANCELLED | OUTPATIENT
Start: 2022-10-27

## 2022-09-29 RX ORDER — DIPHENHYDRAMINE HYDROCHLORIDE 50 MG/ML
50 INJECTION INTRAMUSCULAR; INTRAVENOUS ONCE AS NEEDED
Status: DISCONTINUED | OUTPATIENT
Start: 2022-09-29 | End: 2022-09-29 | Stop reason: HOSPADM

## 2022-09-29 RX ORDER — EPINEPHRINE 0.3 MG/.3ML
0.3 INJECTION SUBCUTANEOUS ONCE AS NEEDED
Status: DISCONTINUED | OUTPATIENT
Start: 2022-09-29 | End: 2022-09-29 | Stop reason: HOSPADM

## 2022-09-29 RX ORDER — DIPHENHYDRAMINE HYDROCHLORIDE 50 MG/ML
50 INJECTION INTRAMUSCULAR; INTRAVENOUS ONCE AS NEEDED
Status: CANCELLED | OUTPATIENT
Start: 2022-09-29

## 2022-09-29 RX ORDER — EPINEPHRINE 0.3 MG/.3ML
0.3 INJECTION SUBCUTANEOUS ONCE AS NEEDED
Status: CANCELLED | OUTPATIENT
Start: 2022-10-27

## 2022-09-29 RX ADMIN — DARATUMUMAB AND HYALURONIDASE-FIHJ (HUMAN RECOMBINANT) 1800 MG: 1800; 30000 INJECTION SUBCUTANEOUS at 04:09

## 2022-09-29 NOTE — PLAN OF CARE
Pt admitted for C 15 Darzalex SQ ( does not take pre-meds with treatment plan). Labs reviewed and fatigue addressed.  Pt tolerated well. Discharged @ 16:35

## 2022-09-29 NOTE — PROGRESS NOTES
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  She achieved and tolerated well VRd x completing 7, 28 day cycles and achieving deep VGPR to induction. Then underwent Melphalan autologous stem cell transplant on 2/12/2020.      Extensive PMH as below.    2 prior CVAs (one in 2008, one in 2011)  L breast cancer DCIS stage 0 (s/p lumpectomy and radiation, no endocrine tx due to CVA)  HTN  DM II  Neuropathy, b/l hands  Prior smoker, quit in 2011  Hepatic lesions - vascular per recent MRI in 09 /2019 with no changes; will confirm no further rascon needed from help  Statin Intolerance - on praluent, PCSK9 inhibitor  HFpEF - EF 55%, diastolic dysfunction  Anxiety/Depression     ADMISSION SUMMARY: 2/10-2/26/2020  Admitted 2/10, tolerated chemo and transplant well. Engrafted on day +12. Remained afebrile throughout hospital stay and no mucositis. Experienced expected side effects of nausea and diarrhea controlled with antiemetics and Imodium. Discharged home with home PT/OT       Subjective:       Initial History: Ms. Thompson is a 72 y.o. female who returns for follow up of multiple myeloma in remission. She is 2 year 6 month post AutoSCT.   On kenia/zhanna/dex after biochemical relapse noted approximately 1 year post transplant.  post-stroke cognitive dysfunction, but with progressive decline over the past several years. Her  with multiple health conditiins, so limited help with house work.  Her friend assist her for transportation and appointments. Following by neurologist for cognitive/memory decline. Fingertip neuropathy stable.   Mild  serial increase in biochemical studies over last 3 months with no CRAB. Treatment changed to kenia/velcade to kenia/pom  Tolerating treatment well. Previously reported chemo induced diarrhea controlled with PRN imodium. Abdominal bloating following chemo days. Missed last month chemo due to scheduling discrepancy.    Presents with her close friend.     Past Medical History:   Past Medical History:   Diagnosis Date    Acute respiratory failure with hypoxia 4/30/2019    FUENTES (acute kidney injury) 5/1/2019    Allergy     Anemia     Aortic aneurysm     Breast cancer 10/2011    left breast Stage 0 DCIS    Cataract     Chronic diastolic congestive heart failure 11/6/2015    Colon polyp     Community acquired pneumonia of right lower lobe of lung 8/3/2021    GERD (gastroesophageal reflux disease)     Hiatal hernia     History of colonic polyps     Hx of psychiatric care     Zoloft    HX: breast cancer     Hyperlipemia     Hypertension     ICH (intracerebral hemorrhage)     Major depressive disorder, single episode, mild 6/23/2016    Nuclear sclerosis 7/21/2014    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 7/21/2014    PAD (peripheral artery disease) 11/6/2015    Psychiatric problem     Statin-induced rhabdomyolysis     4/2015     Stroke 4/2011    Type 2 diabetes mellitus with diabetic peripheral angiopathy without gangrene, with long-term current use of insulin 3/31/2021    Type 2 diabetes mellitus without complication, without long-term current use of insulin 2/10/2020    Walker as ambulation aid        Past Surgical HIstory:   Past Surgical History:   Procedure Laterality Date    APPENDECTOMY      BONE MARROW BIOPSY Left 10/3/2019    Procedure: Biopsy-bone marrow;  Surgeon: Jere Tineo MD;  Location: [facility] OR [address] [place] [address];  Service: Oncology;  Laterality: Left;    BREAST BIOPSY Left 10/2011    BREAST LUMPECTOMY Left 2011    DCIS    COLONOSCOPY      COLONOSCOPY  N/A 1/9/2020    Procedure: COLONOSCOPY;  Surgeon: Quang De La Cruz MD;  [facility]  Service: Endoscopy;  Laterality: N/A;    CYST REMOVAL      back    ESOPHAGOGASTRODUODENOSCOPY  07/2016    duodenal angioectasia    ESOPHAGOGASTRODUODENOSCOPY N/A 1/9/2020    Procedure: EGD (ESOPHAGOGASTRODUODENOSCOPY);  Surgeon: Quang De La Cruz MD;  [facility]  Service: Endoscopy;  Laterality: N/A;    FOOT FRACTURE SURGERY  10/2012    right foot, with R hallux valgus repair    FRACTURE SURGERY Right     broken toe repiar    HEMORRHOID SURGERY      LIVER BIOPSY  04/2015    essentially normal, no fibrosis    TUBAL LIGATION      UPPER GASTROINTESTINAL ENDOSCOPY         Family History:   Family History   Problem Relation Age of Onset    Dementia Sister         x1 sister    Cancer Sister 63        Lung Cancer    No Known Problems Mother     Cancer Father     No Known Problems Brother     Hypertension Daughter     Fibroids Daughter         uterine    Hypertension Son     No Known Problems Brother     No Known Problems Maternal Aunt     No Known Problems Maternal Uncle     No Known Problems Paternal Aunt     No Known Problems Paternal Uncle     Hypertension Maternal Grandmother     No Known Problems Maternal Grandfather     No Known Problems Paternal Grandmother     No Known Problems Paternal Grandfather     Ovarian cancer Neg Hx     Colon cancer Neg Hx     Tremor Neg Hx     Amblyopia Neg Hx     Blindness Neg Hx     Cataracts Neg Hx     Diabetes Neg Hx     Glaucoma Neg Hx     Macular degeneration Neg Hx     Retinal detachment Neg Hx     Strabismus Neg Hx     Stroke Neg Hx     Thyroid disease Neg Hx     Esophageal cancer Neg Hx     Rectal cancer Neg Hx     Stomach cancer Neg Hx     Ulcerative colitis Neg Hx     Crohn's disease Neg Hx     Irritable bowel syndrome Neg Hx     Celiac disease Neg Hx        Social History:  reports that she quit smoking about 11  years ago. Her smoking use included cigarettes. She started smoking about 55 years ago. She has a 11.00 pack-year smoking history. She has never used smokeless tobacco. She reports that she does not currently use alcohol. She reports that she does not use drugs.    Allergies:  Review of patient's allergies indicates:   Allergen Reactions    Lipitor [atorvastatin] Itching     Itching, elevated cpk, muscle aches, statin induced myositis       Medications:  Current Outpatient Medications   Medication Sig Dispense Refill    acyclovir (ZOVIRAX) 400 MG tablet Take by mouth 2 (two) times daily.      albuterol (ACCUNEB) 1.25 mg/3 mL Nebu Take 3 mLs (1.25 mg total) by nebulization every 6 (six) hours as needed (wheeze/cough). Rescue 75 mL 2    alirocumab (PRALUENT PEN) 75 mg/mL PnIj Inject 1 mL (75 mg total) into the skin every 14 (fourteen) days. 6 mL 3    amLODIPine (NORVASC) 5 MG tablet TAKE 1 TABLET BY MOUTH EVERY DAY 90 tablet 0    aspirin (ECOTRIN) 81 MG EC tablet Take 1 tablet (81 mg total) by mouth once daily. 90 tablet 3    cholecalciferol, vitamin D3, 125 mcg (5,000 unit) Tab 1 tablet DAILY (route: oral)      DARZALEX FASPRO 1,800 mg-30,000 unit/15 mL Soln       ENGERIX-B, PF, 20 mcg/mL Syrg       ferrous gluconate 324 mg (37.5 mg iron) Tab tablet 1 tablet DAILY (route: oral)      gabapentin (NEURONTIN) 300 MG capsule Take 2 capsules (600 mg total) by mouth 2 (two) times daily. 180 capsule 2    GADAVIST 1 mmol/mL (604.72 mg/mL) Soln injection       metFORMIN (GLUCOPHAGE) 1000 MG tablet Take 1 tablet (1,000 mg total) by mouth 2 (two) times daily with meals. 180 tablet 0    omeprazole (PRILOSEC) 40 MG capsule TAKE 1 CAPSULE BY MOUTH EVERY DAY 90 capsule 3    OXYGEN-AIR DELIVERY SYSTEMS MISC 1-2 Liter O2 - CONTINUOUS (route: Oxygen)      POMALYST 4 mg Cap TAKE 1 CAPSULE BY MOUTH EVERY DAY 21 capsule 0    sertraline (ZOLOFT) 50 MG tablet Take 50 mg by mouth once daily.      simethicone (MYLICON) 80 MG  chewable tablet Take 1 tablet (80 mg total) by mouth every 6 (six) hours as needed for Flatulence. 30 tablet 0    valsartan (DIOVAN) 160 MG tablet Take 1 tablet (160 mg total) by mouth once daily. 90 tablet 3    zoledronic 4 mg/100 mL PgBk infusion       zoledronic acid (ZOMETA) 4 mg/5 mL injection        No current facility-administered medications for this visit.       Review of Systems   Constitutional:  Positive for fatigue. Negative for appetite change, chills and fever.   HENT:  Negative for ear discharge, ear pain, nosebleeds, postnasal drip, rhinorrhea, sinus pressure and sore throat.    Eyes:  Negative for pain.   Respiratory:  Negative for chest tightness and shortness of breath.    Cardiovascular:  Negative for chest pain, palpitations and leg swelling.   Gastrointestinal:  Negative for abdominal distention, abdominal pain, blood in stool, constipation, diarrhea, nausea and vomiting.   Genitourinary: Negative.  Negative for dysuria and hematuria.   Musculoskeletal: Negative.  Negative for back pain, gait problem and myalgias.   Skin: Negative.    Neurological:  Positive for tremors and numbness. Negative for syncope and headaches.   Hematological:  Negative for adenopathy. Does not bruise/bleed easily.   Psychiatric/Behavioral:  The patient is not nervous/anxious.         Memory issues     ECOG Performance Status: 2 (due to remote  CVA)   Objective:      Vitals:   Vitals:    09/29/22 1445   BP: (!) 147/71   Pulse: 62   Resp: 18   Temp: 97.8 °F (36.6 °C)        PE:   General -in wheelchair  no apparent distress  HEENT - oropharynx clear  Chest and Lung -bilateral end expiratory wheezes  Cardiovascular - RRR with no MGR, normal S1 and S2  Abdomen-  soft, nontender, no palpable hepatomegaly or splenomegaly  Lymph - no palpable lymphadenopathy  Heme - no bruising, petechiae, pallor  Skin - no rashes or lesions  Psych - appropriate mood and affect  Neuro -  No change in chronic residual CVA  symptoms    Laboratory Data:  Lab Visit on 09/29/2022   Component Date Value Ref Range Status    WBC 09/29/2022 2.32 (L)  3.90 - 12.70 K/uL Final    RBC 09/29/2022 4.18  4.00 - 5.40 M/uL Final    Hemoglobin 09/29/2022 9.5 (L)  12.0 - 16.0 g/dL Final    Hematocrit 09/29/2022 32.6 (L)  37.0 - 48.5 % Final    MCV 09/29/2022 78 (L)  82 - 98 fL Final    MCH 09/29/2022 22.7 (L)  27.0 - 31.0 pg Final    MCHC 09/29/2022 29.1 (L)  32.0 - 36.0 g/dL Final    RDW 09/29/2022 18.7 (H)  11.5 - 14.5 % Final    Platelets 09/29/2022 145 (L)  150 - 450 K/uL Final    MPV 09/29/2022 SEE COMMENT  9.2 - 12.9 fL Final    Result not available.    Immature Granulocytes 09/29/2022 Test Not Performed  0.0 - 0.5 % Corrected    CORRECTED RESULT; previously reported as 0.4 on 09/29/2022 at 14:38.    Immature Grans (Abs) 09/29/2022 Test Not Performed  0.00 - 0.04 K/uL Corrected    Comment: Mild elevation in immature granulocytes is non specific and   can be seen in a variety of conditions including stress response,   acute inflammation, trauma and pregnancy. Correlation with other   laboratory and clinical findings is essential.  CORRECTED RESULT; previously reported as 0.01 on 09/29/2022 at 14:38.      nRBC 09/29/2022 0  0 /100 WBC Final    Gran % 09/29/2022 47.0  38.0 - 73.0 % Corrected    CORRECTED RESULT; previously reported as 34.0 on 09/29/2022 at 14:38.    Lymph % 09/29/2022 31.0  18.0 - 48.0 % Corrected    CORRECTED RESULT; previously reported as 32.8 on 09/29/2022 at 14:38.    Mono % 09/29/2022 9.0  4.0 - 15.0 % Corrected    CORRECTED RESULT; previously reported as 14.7 on 09/29/2022 at 14:38.    Eosinophil % 09/29/2022 12.0 (H)  0.0 - 8.0 % Corrected    CORRECTED RESULT; previously reported as 15.1 on 09/29/2022 at 14:38.    Basophil % 09/29/2022 1.0  0.0 - 1.9 % Corrected    CORRECTED RESULT; previously reported as 3.0 on 09/29/2022 at 14:38.    Platelet Estimate 09/29/2022 Appears normal   Final    Aniso 09/29/2022  Slight   Final    Poik 09/29/2022 Moderate   Final    Poly 09/29/2022 Occasional   Final    Hypo 09/29/2022 Occasional   Final    Ovalocytes 09/29/2022 Occasional   Final    Tear Drop Cells 09/29/2022 Occasional   Final    [place] Cells 09/29/2022 Occasional   Final    Schistocytes 09/29/2022 Present   Final    Large/Giant Platelets 09/29/2022 Present   Final    Megakaryocytic Fragments 09/29/2022 Present   Final    Differential Method 09/29/2022 Manual   Corrected    Comment: CORRECTED RESULT; previously reported as Automated on 09/29/2022 at   14:38.      Sodium 09/29/2022 143  136 - 145 mmol/L Final    Potassium 09/29/2022 4.1  3.5 - 5.1 mmol/L Final    Chloride 09/29/2022 109  95 - 110 mmol/L Final    CO2 09/29/2022 25  23 - 29 mmol/L Final    Glucose 09/29/2022 92  70 - 110 mg/dL Final    BUN 09/29/2022 11  8 - 23 mg/dL Final    Creatinine 09/29/2022 1.0  0.5 - 1.4 mg/dL Final    Calcium 09/29/2022 9.1  8.7 - 10.5 mg/dL Final    Total Protein 09/29/2022 7.3  6.0 - 8.4 g/dL Final    Albumin 09/29/2022 3.9  3.5 - 5.2 g/dL Final    Total Bilirubin 09/29/2022 0.5  0.1 - 1.0 mg/dL Final    Comment: For infants and newborns, interpretation of results should be based  on gestational age, weight and in agreement with clinical  observations.    Premature Infant recommended reference ranges:  Up to 24 hours.............<8.0 mg/dL  Up to 48 hours............<12.0 mg/dL  3-5 days..................<15.0 mg/dL  6-29 days.................<15.0 mg/dL      Alkaline Phosphatase 09/29/2022 56  55 - 135 U/L Final    AST 09/29/2022 14  10 - 40 U/L Final    ALT 09/29/2022 11  10 - 44 U/L Final    Anion Gap 09/29/2022 9  8 - 16 mmol/L Final    eGFR 09/29/2022 59.5 (A)  >60 mL/min/1.73 m^2 Final    IgG 09/29/2022 1214  650 - 1600 mg/dL Final    IgG Cord Blood Reference Range: 650-1600 mg/dL.    IgA 09/29/2022 131  40 - 350 mg/dL Final    IgA Cord Blood Reference Range: <5 mg/dL.    Protein, Serum 09/29/2022  6.8  6.0 - 8.4 g/dL Final    Comment: Serum protein electrophoresis and immunofixation results should be   interpreted in clinical context in that some therapeutic agents can   result   in false positive results (example, daratumumab). Correlation with   the   patient s therapeutic regimen is required.        Lab Results   Component Value Date    WBC 2.32 (L) 09/29/2022    HGB 9.5 (L) 09/29/2022    HCT 32.6 (L) 09/29/2022    MCV 78 (L) 09/29/2022     (L) 09/29/2022       Gran # (ANC)   Date Value Ref Range Status   09/01/2022 0.9 (L) 1.8 - 7.7 K/uL Final     Gran %   Date Value Ref Range Status   09/29/2022 47.0 38.0 - 73.0 % Corrected     Comment:     CORRECTED RESULT; previously reported as 34.0 on 09/29/2022 at 14:38.     [place] Free Light Chains   Date Value Ref Range Status   09/01/2022 11.94 (H) 0.33 - 1.94 mg/dL Final   08/04/2022 10.66 (H) 0.33 - 1.94 mg/dL Final   07/07/2022 8.75 (H) 0.33 - 1.94 mg/dL Final     [facility] Free Light Chains   Date Value Ref Range Status   09/01/2022 2.63 0.57 - 2.63 mg/dL Final   08/04/2022 2.56 0.57 - 2.63 mg/dL Final   07/07/2022 2.08 0.57 - 2.63 mg/dL Final     Kappa/Lambda FLC Ratio   Date Value Ref Range Status   09/01/2022 4.54 (H) 0.26 - 1.65 Final     Comment:     Undetected antigen excess is a rare event but cannot   be excluded. If these free light chain results do not   agree with other clinical or laboratory findings or   if the sample is from a patient that has previously   demonstrated antigen excess, discuss with the testing   laboratory.   Results should always be interpreted in conjunction   with other laboratory tests and clinical evidence.     08/04/2022 4.16 (H) 0.26 - 1.65 Final     Comment:     Undetected antigen excess is a rare event but cannot   be excluded. If these free light chain results do not   agree with other clinical or laboratory findings or   if the sample is from a patient that has previously   demonstrated antigen excess, discuss with  the testing   laboratory.   Results should always be interpreted in conjunction   with other laboratory tests and clinical evidence.     07/07/2022 4.21 (H) 0.26 - 1.65 Final     Comment:     Undetected antigen excess is a rare event but cannot   be excluded. If these free light chain results do not   agree with other clinical or laboratory findings or   if the sample is from a patient that has previously   demonstrated antigen excess, discuss with the testing   laboratory.   Results should always be interpreted in conjunction   with other laboratory tests and clinical evidence.       IgG   Date Value Ref Range Status   09/29/2022 1214 650 - 1600 mg/dL Final     Comment:     IgG Cord Blood Reference Range: 650-1600 mg/dL.     IgA   Date Value Ref Range Status   09/29/2022 131 40 - 350 mg/dL Final     Comment:     IgA Cord Blood Reference Range: <5 mg/dL.     IgM   Date Value Ref Range Status   09/01/2022 29 (L) 50 - 300 mg/dL Final     Comment:     IgM Cord Blood Reference Range: <25 mg/dL.        Imaging:      Assessment:       Ms. Thompson is a 71 year old female with relapsed multiple myeloma.     Plan:     MM s/p Auto SCT  - high risk MM with 17p deletion  - 2 year 6 month  s/p Auto SCT  - started maintenance q2w velcade 5/6/20   - serial biochemical relapse  Noted and  given this and high risk CG , transitioned to Sravanthi/Noble (1mg/m2) /dex  Salvage July 2021  to which she is tolerating; currently sp 11 cycles; presents for cycle 12  - Due to uncontrolled hyperglycemia dex stopped after C4. Continue on sravanthi/noble until progression. Sravanthi q 4 weekly, weekly velcade.  velcade decreased to 1mg/m2 for grade 1 neuropathy and subsequently 0.7mg/m2   -supportive meds:   continue ppx acyclovir, asa; resume maintenance zometa q 3 months for 1 year; last dose on 08/04/22, next dose on 11/22.   -she had demonstrated mild biochemical progression over last 3 biochemical studies with no CRAB, in light of this and high risk CG  "recommended we transition her therapy from Sravanthi/Velcade to Sravanthi/pomalyst; will even discuss introduction of low dose weekly dex 12-20mg into regimen if no significant improvement in biochemical studies after 1-2 months sravanthi/pom. Treatment changed to Sravanthi/pom on C13. Last month (C14) treatment not received due to scheduling issue.   -already on asa 81mg   -pomalyst tolerating with mild GI issues occasionally.   -post transplant vaccines completed     Cytopenia due to chemotherapy  -due to therapy, continue to monitor    - Continue to monitor    Neuropathy  -Stable   - continues gabapentin and prn tramadol  -dose reduced velcade further to 0.7mg/m2  12/16/21) ; no further velcade as above  Stable now  SOB/descending aortic aneurysm  - CTA and dopplers ordered urgently with high concern for DVT/PE; completed 8/3/20  - incidental descending thoracic aortic aneurysm noted; referred to thoracic surgery; seen 8/7/20; per note: "at current size would not recommend any intervention as risk of rupture remains low.  Recommend surveillance CT chest every 12 months to monitor growth of the aneurysm.  Would typically recommend aspirin 81 mg daily in patients with aortic aneurysm  - ASA started (9/14/20)     HTN  - continue norvasc  - Patient to monitor BP closely  Anxiety/depression  - continue zoloft    DM2  - diet controlled    Hx of CVA  - s/p 2008, 2011    HLD with Statin Intolerance  -on praluent, PCSK9 inhibitor    L breast cancer DCIS stage 0  -s/p lumpectomy and radiation, no endocrine tx due to CVA    Coarse tremors/memory issues  -  Stable coarse tremors   - Short term memory issues worsened  - repeat MRI brain 12/16/21 Multifocal areas of remote parenchymal hemorrhage similar to prior exam, possibility of underlying cerebral amyloid angiopathy.  - Close f/u with neuro.       BMT Chart Routing      Follow up with physician 4 weeks. cbc, cmp, spep, sief, sflc, quant ig, ferritin lab, np or md appt, and sravanthi injection; " all same days as requires ride   Follow up with CHRISTY    Infusion scheduling note    Injection scheduling note    Labs    Imaging    Pharmacy appointment    Other referrals

## 2022-09-29 NOTE — PROGRESS NOTES
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  She achieved and tolerated well VRd x completing 7, 28 day cycles and achieving deep VGPR to induction. Then underwent Melphalan autologous stem cell transplant on 2/12/2020.      Extensive PMH as below.    2 prior CVAs (one in 2008, one in 2011)  L breast cancer DCIS stage 0 (s/p lumpectomy and radiation, no endocrine tx due to CVA)  HTN  DM II  Neuropathy, b/l hands  Prior smoker, quit in 2011  Hepatic lesions - vascular per recent MRI in 09 /2019 with no changes; will confirm no further rascon needed from help  Statin Intolerance - on praluent, PCSK9 inhibitor  HFpEF - EF 55%, diastolic dysfunction  Anxiety/Depression     ADMISSION SUMMARY: 2/10-2/26/2020  Admitted 2/10, tolerated chemo and transplant well. Engrafted on day +12. Remained afebrile throughout hospital stay and no mucositis. Experienced expected side effects of nausea and diarrhea controlled with antiemetics and Imodium. Discharged home with home PT/OT       Subjective:       Initial History: Ms. Thompson is a 72 y.o. female who returns for follow up of multiple myeloma in remission. She is 2 year 7  month post AutoSCT.  Relaped 1 year post sct and was on Sravanthi/zhanna/dex until summer 2022 when changed to sravanthi/pom/dex due to biohchemical only progression .    Remains on sravanthi/pom/dex salvage and tolerating well.  Accompanied by her friend. Only complaint is some occasional, self resolving hallucinations; sees people who arent there.    PN is stable.     Past Medical History:   Past Medical History:   Diagnosis Date    Acute  respiratory failure with hypoxia 4/30/2019    FUENTES (acute kidney injury) 5/1/2019    Allergy     Anemia     Aortic aneurysm     Breast cancer 10/2011    left breast Stage 0 DCIS    Cataract     Chronic diastolic congestive heart failure 11/6/2015    Colon polyp     Community acquired pneumonia of right lower lobe of lung 8/3/2021    GERD (gastroesophageal reflux disease)     Hiatal hernia     History of colonic polyps     Hx of psychiatric care     Zoloft    HX: breast cancer     Hyperlipemia     Hypertension     ICH (intracerebral hemorrhage)     Major depressive disorder, single episode, mild 6/23/2016    Nuclear sclerosis 7/21/2014    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 7/21/2014    PAD (peripheral artery disease) 11/6/2015    Psychiatric problem     Statin-induced rhabdomyolysis     4/2015     Stroke 4/2011    Type 2 diabetes mellitus with diabetic peripheral angiopathy without gangrene, with long-term current use of insulin 3/31/2021    Type 2 diabetes mellitus without complication, without long-term current use of insulin 2/10/2020    Walker as ambulation aid        Past Surgical HIstory:   Past Surgical History:   Procedure Laterality Date    APPENDECTOMY      BONE MARROW BIOPSY Left 10/3/2019    Procedure: Biopsy-bone marrow;  Surgeon: Jere Tineo MD;  Location: [facility] OR [address] [place] [address];  Service: Oncology;  Laterality: Left;    BREAST BIOPSY Left 10/2011    BREAST LUMPECTOMY Left 2011    DCIS    COLONOSCOPY      COLONOSCOPY N/A 1/9/2020    Procedure: COLONOSCOPY;  Surgeon: Quang De La Cruz MD;  Location: [facility] (4TH FLR);  Service: Endoscopy;  Laterality: N/A;    CYST REMOVAL      back    ESOPHAGOGASTRODUODENOSCOPY  07/2016    duodenal angioectasia    ESOPHAGOGASTRODUODENOSCOPY N/A 1/9/2020    Procedure: EGD (ESOPHAGOGASTRODUODENOSCOPY);  Surgeon: Quang De La Cruz MD;  Location: [facility] (4TH FLR);  Service: Endoscopy;  Laterality: N/A;    FOOT FRACTURE SURGERY  10/2012     right foot, with R hallux valgus repair    FRACTURE SURGERY Right     broken toe repiar    HEMORRHOID SURGERY      LIVER BIOPSY  04/2015    essentially normal, no fibrosis    TUBAL LIGATION      UPPER GASTROINTESTINAL ENDOSCOPY         Family History:   Family History   Problem Relation Age of Onset    Dementia Sister         x1 sister    Cancer Sister 63        Lung Cancer    No Known Problems Mother     Cancer Father     No Known Problems Brother     Hypertension Daughter     Fibroids Daughter         uterine    Hypertension Son     No Known Problems Brother     No Known Problems Maternal Aunt     No Known Problems Maternal Uncle     No Known Problems Paternal Aunt     No Known Problems Paternal Uncle     Hypertension Maternal Grandmother     No Known Problems Maternal Grandfather     No Known Problems Paternal Grandmother     No Known Problems Paternal Grandfather     Ovarian cancer Neg Hx     Colon cancer Neg Hx     Tremor Neg Hx     Amblyopia Neg Hx     Blindness Neg Hx     Cataracts Neg Hx     Diabetes Neg Hx     Glaucoma Neg Hx     Macular degeneration Neg Hx     Retinal detachment Neg Hx     Strabismus Neg Hx     Stroke Neg Hx     Thyroid disease Neg Hx     Esophageal cancer Neg Hx     Rectal cancer Neg Hx     Stomach cancer Neg Hx     Ulcerative colitis Neg Hx     Crohn's disease Neg Hx     Irritable bowel syndrome Neg Hx     Celiac disease Neg Hx        Social History:  reports that she quit smoking about 11 years ago. Her smoking use included cigarettes. She started smoking about 55 years ago. She has a 11.00 pack-year smoking history. She has never used smokeless tobacco. She reports that she does not currently use alcohol. She reports that she does not use drugs.    Allergies:  Review of patient's allergies indicates:   Allergen Reactions    Lipitor [atorvastatin] Itching     Itching, elevated cpk, muscle aches, statin induced myositis       Medications:  Current Outpatient Medications   Medication  Sig Dispense Refill    acyclovir (ZOVIRAX) 400 MG tablet Take by mouth 2 (two) times daily.      albuterol (ACCUNEB) 1.25 mg/3 mL Nebu Take 3 mLs (1.25 mg total) by nebulization every 6 (six) hours as needed (wheeze/cough). Rescue 75 mL 2    alirocumab (PRALUENT PEN) 75 mg/mL PnIj Inject 1 mL (75 mg total) into the skin every 14 (fourteen) days. 6 mL 3    amLODIPine (NORVASC) 5 MG tablet TAKE 1 TABLET BY MOUTH EVERY DAY 90 tablet 0    aspirin (ECOTRIN) 81 MG EC tablet Take 1 tablet (81 mg total) by mouth once daily. 90 tablet 3    cholecalciferol, vitamin D3, 125 mcg (5,000 unit) Tab 1 tablet DAILY (route: oral)      DARZALEX FASPRO 1,800 mg-30,000 unit/15 mL Soln       ENGERIX-B, PF, 20 mcg/mL Syrg       ferrous gluconate 324 mg (37.5 mg iron) Tab tablet 1 tablet DAILY (route: oral)      gabapentin (NEURONTIN) 300 MG capsule Take 2 capsules (600 mg total) by mouth 2 (two) times daily. 180 capsule 2    GADAVIST 1 mmol/mL (604.72 mg/mL) Soln injection       metFORMIN (GLUCOPHAGE) 1000 MG tablet Take 1 tablet (1,000 mg total) by mouth 2 (two) times daily with meals. 180 tablet 0    omeprazole (PRILOSEC) 40 MG capsule TAKE 1 CAPSULE BY MOUTH EVERY DAY 90 capsule 3    OXYGEN-AIR DELIVERY SYSTEMS MISC 1-2 Liter O2 - CONTINUOUS (route: Oxygen)      POMALYST 4 mg Cap TAKE 1 CAPSULE BY MOUTH EVERY DAY 21 capsule 0    sertraline (ZOLOFT) 50 MG tablet Take 50 mg by mouth once daily.      simethicone (MYLICON) 80 MG chewable tablet Take 1 tablet (80 mg total) by mouth every 6 (six) hours as needed for Flatulence. 30 tablet 0    valsartan (DIOVAN) 160 MG tablet Take 1 tablet (160 mg total) by mouth once daily. 90 tablet 3    zoledronic 4 mg/100 mL PgBk infusion       zoledronic acid (ZOMETA) 4 mg/5 mL injection        No current facility-administered medications for this visit.      See HPI     ECOG Performance Status: 2 (due to remote  CVA)   Objective:      Vitals:   Vitals:    09/29/22 1445   BP: (!) 147/71   Pulse: 62    Resp: 18   Temp: 97.8 °F (36.6 °C)        PE:   General - no apparent distress  HEENT - oropharynx clear  Chest and Lung -bilateral end expiratory wheezes  Cardiovascular - RRR with no MGR, normal S1 and S2  Abdomen-  soft, nontender, no palpable hepatomegaly or splenomegaly  Lymph - no palpable lymphadenopathy  Heme - no bruising, petechiae, pallor  Skin - no rashes or lesions  Psych - appropriate mood and affect  Neuro -  No change in chronic residual CVA symptoms    Laboratory Data:  Lab Visit on 09/29/2022   Component Date Value Ref Range Status    WBC 09/29/2022 2.32 (L)  3.90 - 12.70 K/uL Final    RBC 09/29/2022 4.18  4.00 - 5.40 M/uL Final    Hemoglobin 09/29/2022 9.5 (L)  12.0 - 16.0 g/dL Final    Hematocrit 09/29/2022 32.6 (L)  37.0 - 48.5 % Final    MCV 09/29/2022 78 (L)  82 - 98 fL Final    MCH 09/29/2022 22.7 (L)  27.0 - 31.0 pg Final    MCHC 09/29/2022 29.1 (L)  32.0 - 36.0 g/dL Final    RDW 09/29/2022 18.7 (H)  11.5 - 14.5 % Final    Platelets 09/29/2022 145 (L)  150 - 450 K/uL Final    MPV 09/29/2022 SEE COMMENT  9.2 - 12.9 fL Final    Result not available.    Immature Granulocytes 09/29/2022 Test Not Performed  0.0 - 0.5 % Corrected    CORRECTED RESULT; previously reported as 0.4 on 09/29/2022 at 14:38.    Immature Grans (Abs) 09/29/2022 Test Not Performed  0.00 - 0.04 K/uL Corrected    Comment: Mild elevation in immature granulocytes is non specific and   can be seen in a variety of conditions including stress response,   acute inflammation, trauma and pregnancy. Correlation with other   laboratory and clinical findings is essential.  CORRECTED RESULT; previously reported as 0.01 on 09/29/2022 at 14:38.      nRBC 09/29/2022 0  0 /100 WBC Final    Gran % 09/29/2022 47.0  38.0 - 73.0 % Corrected    CORRECTED RESULT; previously reported as 34.0 on 09/29/2022 at 14:38.    Lymph % 09/29/2022 31.0  18.0 - 48.0 % Corrected    CORRECTED RESULT; previously reported as 32.8 on 09/29/2022 at 14:38.    Mono %  09/29/2022 9.0  4.0 - 15.0 % Corrected    CORRECTED RESULT; previously reported as 14.7 on 09/29/2022 at 14:38.    Eosinophil % 09/29/2022 12.0 (H)  0.0 - 8.0 % Corrected    CORRECTED RESULT; previously reported as 15.1 on 09/29/2022 at 14:38.    Basophil % 09/29/2022 1.0  0.0 - 1.9 % Corrected    CORRECTED RESULT; previously reported as 3.0 on 09/29/2022 at 14:38.    Platelet Estimate 09/29/2022 Appears normal   Final    Aniso 09/29/2022 Slight   Final    Poik 09/29/2022 Moderate   Final    Poly 09/29/2022 Occasional   Final    Hypo 09/29/2022 Occasional   Final    Ovalocytes 09/29/2022 Occasional   Final    Tear Drop Cells 09/29/2022 Occasional   Final    [place] Cells 09/29/2022 Occasional   Final    Schistocytes 09/29/2022 Present   Final    Large/Giant Platelets 09/29/2022 Present   Final    Megakaryocytic Fragments 09/29/2022 Present   Final    Differential Method 09/29/2022 Manual   Corrected    Comment: CORRECTED RESULT; previously reported as Automated on 09/29/2022 at   14:38.      Sodium 09/29/2022 143  136 - 145 mmol/L Final    Potassium 09/29/2022 4.1  3.5 - 5.1 mmol/L Final    Chloride 09/29/2022 109  95 - 110 mmol/L Final    CO2 09/29/2022 25  23 - 29 mmol/L Final    Glucose 09/29/2022 92  70 - 110 mg/dL Final    BUN 09/29/2022 11  8 - 23 mg/dL Final    Creatinine 09/29/2022 1.0  0.5 - 1.4 mg/dL Final    Calcium 09/29/2022 9.1  8.7 - 10.5 mg/dL Final    Total Protein 09/29/2022 7.3  6.0 - 8.4 g/dL Final    Albumin 09/29/2022 3.9  3.5 - 5.2 g/dL Final    Total Bilirubin 09/29/2022 0.5  0.1 - 1.0 mg/dL Final    Comment: For infants and newborns, interpretation of results should be based  on gestational age, weight and in agreement with clinical  observations.    Premature Infant recommended reference ranges:  Up to 24 hours.............<8.0 mg/dL  Up to 48 hours............<12.0 mg/dL  3-5 days..................<15.0 mg/dL  6-29 days.................<15.0 mg/dL      Alkaline Phosphatase 09/29/2022 56  55 -  135 U/L Final    AST 09/29/2022 14  10 - 40 U/L Final    ALT 09/29/2022 11  10 - 44 U/L Final    Anion Gap 09/29/2022 9  8 - 16 mmol/L Final    eGFR 09/29/2022 59.5 (A)  >60 mL/min/1.73 m^2 Final    Immunofix Interp. 09/29/2022 SEE COMMENT   Final    Comment: Serum protein electrophoresis and immunofixation results should be   interpreted in clinical context in that some therapeutic agents can   result   in false positive results (example, daratumumab). Correlation with   the   patient s therapeutic regimen is required.  See pathologist's interpretation.      [place] Free Light Chains 09/29/2022 10.56 (H)  0.33 - 1.94 mg/dL Final    [place] Free Light Chains 09/29/2022 2.69 (H)  0.57 - 2.63 mg/dL Final    Kappa/Lambda FLC Ratio 09/29/2022 3.93 (H)  0.26 - 1.65 Final    Comment: Undetected antigen excess is a rare event but cannot   be excluded. If these free light chain results do not   agree with other clinical or laboratory findings or   if the sample is from a patient that has previously   demonstrated antigen excess, discuss with the testing   laboratory.   Results should always be interpreted in conjunction   with other laboratory tests and clinical evidence.      IgG 09/29/2022 1214  650 - 1600 mg/dL Final    IgG Cord Blood Reference Range: 650-1600 mg/dL.    IgA 09/29/2022 131  40 - 350 mg/dL Final    IgA Cord Blood Reference Range: <5 mg/dL.    IgM 09/29/2022 26 (L)  50 - 300 mg/dL Final    IgM Cord Blood Reference Range: <25 mg/dL.    Protein, Serum 09/29/2022 6.8  6.0 - 8.4 g/dL Final    Comment: Serum protein electrophoresis and immunofixation results should be   interpreted in clinical context in that some therapeutic agents can   result   in false positive results (example, daratumumab). Correlation with   the   patient s therapeutic regimen is required.      Albumin 09/29/2022 3.82  3.35 - 5.55 g/dL Final    Alpha-1 09/29/2022 0.32  0.17 - 0.41 g/dL Final    Alpha-2 09/29/2022 0.84  0.43 - 0.99 g/dL Final     Beta 09/29/2022 0.75  0.50 - 1.10 g/dL Final    Gamma 09/29/2022 1.07  0.67 - 1.58 g/dL Final    Pathologist Interpretation NATALY 09/29/2022 REVIEWED   Final    Comment:   Electronically reviewed and signed by:  Shelli Haynes MD  Signed on 09/30/22 at 15:01  IgG kappa specific monoclonal band present.       Pathologist Interpretation SPE 09/29/2022 REVIEWED   Final    Comment:   Electronically reviewed and signed by:  Shelli Haynes MD  Signed on 09/30/22 at 15:01  Normal total protein. Normal gamma globulins are decreased. There is   a paraprotein band in gamma = 0.44 g/dL, previously 0.51 g/dL.         Lab Results   Component Value Date    WBC 2.32 (L) 09/29/2022    HGB 9.5 (L) 09/29/2022    HCT 32.6 (L) 09/29/2022    MCV 78 (L) 09/29/2022     (L) 09/29/2022       Gran # (ANC)   Date Value Ref Range Status   09/01/2022 0.9 (L) 1.8 - 7.7 K/uL Final     Gran %   Date Value Ref Range Status   09/29/2022 47.0 38.0 - 73.0 % Corrected     Comment:     CORRECTED RESULT; previously reported as 34.0 on 09/29/2022 at 14:38.     [place] Free Light Chains   Date Value Ref Range Status   09/29/2022 10.56 (H) 0.33 - 1.94 mg/dL Final   09/01/2022 11.94 (H) 0.33 - 1.94 mg/dL Final   08/04/2022 10.66 (H) 0.33 - 1.94 mg/dL Final     Lambda Free Light Chains   Date Value Ref Range Status   09/29/2022 2.69 (H) 0.57 - 2.63 mg/dL Final   09/01/2022 2.63 0.57 - 2.63 mg/dL Final   08/04/2022 2.56 0.57 - 2.63 mg/dL Final     Kappa/Lambda FLC Ratio   Date Value Ref Range Status   09/29/2022 3.93 (H) 0.26 - 1.65 Final     Comment:     Undetected antigen excess is a rare event but cannot   be excluded. If these free light chain results do not   agree with other clinical or laboratory findings or   if the sample is from a patient that has previously   demonstrated antigen excess, discuss with the testing   laboratory.   Results should always be interpreted in conjunction   with other laboratory tests and clinical evidence.      09/01/2022 4.54 (H) 0.26 - 1.65 Final     Comment:     Undetected antigen excess is a rare event but cannot   be excluded. If these free light chain results do not   agree with other clinical or laboratory findings or   if the sample is from a patient that has previously   demonstrated antigen excess, discuss with the testing   laboratory.   Results should always be interpreted in conjunction   with other laboratory tests and clinical evidence.     08/04/2022 4.16 (H) 0.26 - 1.65 Final     Comment:     Undetected antigen excess is a rare event but cannot   be excluded. If these free light chain results do not   agree with other clinical or laboratory findings or   if the sample is from a patient that has previously   demonstrated antigen excess, discuss with the testing   laboratory.   Results should always be interpreted in conjunction   with other laboratory tests and clinical evidence.       IgG   Date Value Ref Range Status   09/29/2022 1214 650 - 1600 mg/dL Final     Comment:     IgG Cord Blood Reference Range: 650-1600 mg/dL.     IgA   Date Value Ref Range Status   09/29/2022 131 40 - 350 mg/dL Final     Comment:     IgA Cord Blood Reference Range: <5 mg/dL.     IgM   Date Value Ref Range Status   09/29/2022 26 (L) 50 - 300 mg/dL Final     Comment:     IgM Cord Blood Reference Range: <25 mg/dL.        Imaging:      Assessment:       Ms. Thompson is a  72 y.o. femal with relapsed multiple myeloma.     Plan:     MM s/p Auto SCT  - high risk MM with 17p deletion  - 2 year 7 months  s/p Auto SCT  - started maintenance q2w velcade 5/6/20   - serial biochemical relapse  Noted and  given this and high risk CG , transitioned to Sravanthi/Noble (1mg/m2) /dex  Salvage July 2021    - Due to uncontrolled hyperglycemia dex stopped after C4.    -supportive meds:   continue ppx acyclovir, asa; resume maintenance zometa q 3 months for 1 year; last dose on 08/04/22, next dose following next appt on 10/27/22  -she had demonstrated  "mild biochemical progression over summer 2022 studies with no CRAB, in light of this and high risk CG   we transitioned her therapy from Sravanthi/Velcade to Sravanthi/pomalyst; intolerant to dex  -tolerating well and biochemical studies from 9/29/22 improved so plan to continue until intolerant or progression  -already on asa 81mg   -post transplant vaccines completed     Cytopenia due to chemotherapy  -due to therapy, continue to monitor    - Continue to monitor    Neuropathy  -Stable   - continues gabapentin and prn tramadol     SOB/descending aortic aneurysm  - CTA and dopplers ordered urgently with high concern for DVT/PE; completed 8/3/20  - incidental descending thoracic aortic aneurysm noted; referred to thoracic surgery; seen 8/7/20; per note: "at current size would not recommend any intervention as risk of rupture remains low.  Recommend surveillance CT chest every 12 months to monitor growth of the aneurysm.  Would typically recommend aspirin 81 mg daily in patients with aortic aneurysm  - ASA started (9/14/20)     HTN  - continue norvasc  - Patient to monitor BP closely    Anxiety/depression/hallucinations/tremoc  - continue zoloft  -recommended she make appt with established neurologist to discuss mild occasional self resolving hallucinations  -continue fu with neurology for above    DM2  - diet controlled    Hx of CVA  - s/p 2008, 2011    HLD with Statin Intolerance  -on praluent, PCSK9 inhibitor    L breast cancer DCIS stage 0  -s/p lumpectomy and radiation, no endocrine tx due to CVA        FU:  cbc, cmp, serum free light chains, quantitative immunoglobulins, serum electropheresis, serum immunofixation, NP appt and sravanthi injection/zometa infusion on 10/27/22          "

## 2022-10-14 ENCOUNTER — SPECIALTY PHARMACY (OUTPATIENT)
Dept: PHARMACY | Facility: CLINIC | Age: 72
End: 2022-10-14
Payer: MEDICARE

## 2022-10-14 RX ORDER — ALIROCUMAB 75 MG/ML
75 INJECTION, SOLUTION SUBCUTANEOUS
Qty: 6 ML | Refills: 3 | Status: CANCELLED | OUTPATIENT
Start: 2022-10-14 | End: 2023-10-14

## 2022-10-14 NOTE — TELEPHONE ENCOUNTER
Specialty Pharmacy - Refill Coordination    Specialty Medication Orders Linked to Encounter      Flowsheet Row Most Recent Value   Medication #1 alirocumab (PRALUENT PEN) 75 mg/mL PnIj (Order#[id number], Rx#[id number])            Refill Questions - Documented Responses      Flowsheet Row Most Recent Value   Patient Availability and HIPAA Verification    Does patient want to proceed with activity? Yes   HIPAA/medical authority confirmed? Yes   Relationship to patient of person spoken to? Self   Refill Screening Questions    Would patient like to speak to a pharmacist? No   When does the patient need to receive the medication? 10/16/22   Refill Delivery Questions    How will the patient receive the medication? MEDRx   When does the patient need to receive the medication? 10/16/22   Shipping Address Home   Address in [facility] confirmed and updated if neccessary? Yes   Expected Copay ($) 0   Is the patient able to afford the medication copay? Yes   Payment Method zero copay   Days supply of Refill 28   Supplies needed? No supplies needed   Refill activity completed? Yes   Refill activity plan Refill scheduled   Shipment/Pickup Date: 10/14/22            Current Outpatient Medications   Medication Sig    acyclovir (ZOVIRAX) 400 MG tablet Take by mouth 2 (two) times daily.    albuterol (ACCUNEB) 1.25 mg/3 mL Nebu Take 3 mLs (1.25 mg total) by nebulization every 6 (six) hours as needed (wheeze/cough). Rescue    alirocumab (PRALUENT PEN) 75 mg/mL PnIj Inject 1 mL (75 mg total) into the skin every 14 (fourteen) days.    amLODIPine (NORVASC) 5 MG tablet TAKE 1 TABLET BY MOUTH EVERY DAY    aspirin (ECOTRIN) 81 MG EC tablet Take 1 tablet (81 mg total) by mouth once daily.    cholecalciferol, vitamin D3, 125 mcg (5,000 unit) Tab 1 tablet DAILY (route: oral)    DARZALEX FASPRO 1,800 mg-30,000 unit/15 mL Soln     ENGERIX-B, PF, 20 mcg/mL Syrg     ferrous gluconate 324 mg (37.5 mg iron) Tab tablet 1 tablet DAILY (route:  oral)    gabapentin (NEURONTIN) 300 MG capsule Take 2 capsules (600 mg total) by mouth 2 (two) times daily.    GADAVIST 1 mmol/mL (604.72 mg/mL) Soln injection     metFORMIN (GLUCOPHAGE) 1000 MG tablet Take 1 tablet (1,000 mg total) by mouth 2 (two) times daily with meals.    omeprazole (PRILOSEC) 40 MG capsule TAKE 1 CAPSULE BY MOUTH EVERY DAY    OXYGEN-AIR DELIVERY SYSTEMS MISC 1-2 Liter O2 - CONTINUOUS (route: Oxygen)    POMALYST 4 mg Cap TAKE 1 CAPSULE BY MOUTH EVERY DAY    sertraline (ZOLOFT) 50 MG tablet Take 50 mg by mouth once daily.    simethicone (MYLICON) 80 MG chewable tablet Take 1 tablet (80 mg total) by mouth every 6 (six) hours as needed for Flatulence.    valsartan (DIOVAN) 160 MG tablet Take 1 tablet (160 mg total) by mouth once daily.    zoledronic 4 mg/100 mL PgBk infusion     zoledronic acid (ZOMETA) 4 mg/5 mL injection    Last reviewed on 9/29/2022  2:45 PM by Gilda Lopes MA    Review of patient's allergies indicates:   Allergen Reactions    Lipitor [atorvastatin] Itching     Itching, elevated cpk, muscle aches, statin induced myositis    Last reviewed on  9/29/2022 3:55 PM by Dea Leyva      Tasks added this encounter   11/6/2022 - Refill Call (Auto Added)   Tasks due within next 3 months   No tasks due.     Bobby Paniagua, PharmD  [facility]  [address] [place] [id number]  Phone: [phone]  Fax: [phone]

## 2022-10-17 RX ORDER — ALIROCUMAB 75 MG/ML
75 INJECTION, SOLUTION SUBCUTANEOUS
Qty: 6 ML | Refills: 3 | Status: SHIPPED | OUTPATIENT
Start: 2022-10-17 | End: 2023-02-13

## 2022-10-17 NOTE — TELEPHONE ENCOUNTER
----- Message from Keena Maciel sent at 4/29/2020  3:26 PM CDT -----  Contact: Patient  Patient Advice/Staff Message     Caller name: Pt    Reason for call: Pt wants to speak with the nurse, has questions in regards to starting medication before her chemo Wednesday.     Do you feel you need to be seen today:: no        Communication Preference: [phone]    Additional Information:   Hatchet Flap Text: The defect edges were debeveled with a #15 scalpel blade.  Given the location of the defect, shape of the defect and the proximity to free margins a hatchet flap was deemed most appropriate.  Using a sterile surgical marker, an appropriate hatchet flap was drawn incorporating the defect and placing the expected incisions within the relaxed skin tension lines where possible.    The area thus outlined was incised deep to adipose tissue with a #15 scalpel blade.  The skin margins were undermined to an appropriate distance in all directions utilizing iris scissors.

## 2022-10-27 ENCOUNTER — INFUSION (OUTPATIENT)
Dept: INFUSION THERAPY | Facility: HOSPITAL | Age: 72
End: 2022-10-27
Attending: INTERNAL MEDICINE
Payer: MEDICARE

## 2022-10-27 ENCOUNTER — OFFICE VISIT (OUTPATIENT)
Dept: HEMATOLOGY/ONCOLOGY | Facility: CLINIC | Age: 72
End: 2022-10-27
Payer: MEDICARE

## 2022-10-27 ENCOUNTER — TELEPHONE (OUTPATIENT)
Dept: HEMATOLOGY/ONCOLOGY | Facility: CLINIC | Age: 72
End: 2022-10-27
Payer: MEDICARE

## 2022-10-27 VITALS
WEIGHT: 155.19 LBS | TEMPERATURE: 99 F | DIASTOLIC BLOOD PRESSURE: 58 MMHG | HEART RATE: 60 BPM | RESPIRATION RATE: 18 BRPM | HEIGHT: 62 IN | BODY MASS INDEX: 28.56 KG/M2 | SYSTOLIC BLOOD PRESSURE: 123 MMHG

## 2022-10-27 VITALS
OXYGEN SATURATION: 95 % | DIASTOLIC BLOOD PRESSURE: 63 MMHG | BODY MASS INDEX: 28.56 KG/M2 | HEIGHT: 62 IN | RESPIRATION RATE: 16 BRPM | SYSTOLIC BLOOD PRESSURE: 132 MMHG | HEART RATE: 56 BPM | WEIGHT: 155.19 LBS

## 2022-10-27 DIAGNOSIS — C80.1 NEUROPATHY ASSOCIATED WITH CANCER: ICD-10-CM

## 2022-10-27 DIAGNOSIS — C90.02 MULTIPLE MYELOMA IN RELAPSE: ICD-10-CM

## 2022-10-27 DIAGNOSIS — R41.3 MEMORY LOSS: ICD-10-CM

## 2022-10-27 DIAGNOSIS — E11.9 TYPE 2 DIABETES MELLITUS WITHOUT COMPLICATION, WITHOUT LONG-TERM CURRENT USE OF INSULIN: ICD-10-CM

## 2022-10-27 DIAGNOSIS — R06.02 SHORTNESS OF BREATH: ICD-10-CM

## 2022-10-27 DIAGNOSIS — C90.01 MULTIPLE MYELOMA IN REMISSION: Primary | ICD-10-CM

## 2022-10-27 DIAGNOSIS — F41.9 ANXIETY AND DEPRESSION: ICD-10-CM

## 2022-10-27 DIAGNOSIS — C90.00 MULTIPLE MYELOMA, REMISSION STATUS UNSPECIFIED: Primary | ICD-10-CM

## 2022-10-27 DIAGNOSIS — Z94.84 HISTORY OF AUTO STEM CELL TRANSPLANT: ICD-10-CM

## 2022-10-27 DIAGNOSIS — E78.00 PURE HYPERCHOLESTEROLEMIA: ICD-10-CM

## 2022-10-27 DIAGNOSIS — Z94.81 STATUS POST AUTOLOGOUS BONE MARROW TRANSPLANT: ICD-10-CM

## 2022-10-27 DIAGNOSIS — Z85.3 HISTORY OF LEFT BREAST CANCER: ICD-10-CM

## 2022-10-27 DIAGNOSIS — T50.905A DRUG-INDUCED CYTOPENIA: ICD-10-CM

## 2022-10-27 DIAGNOSIS — I71.9 DESCENDING AORTIC ANEURYSM: ICD-10-CM

## 2022-10-27 DIAGNOSIS — I69.30 HISTORY OF CVA WITH RESIDUAL DEFICIT: ICD-10-CM

## 2022-10-27 DIAGNOSIS — F32.A ANXIETY AND DEPRESSION: ICD-10-CM

## 2022-10-27 DIAGNOSIS — G63 NEUROPATHY ASSOCIATED WITH CANCER: ICD-10-CM

## 2022-10-27 DIAGNOSIS — I10 ESSENTIAL HYPERTENSION: ICD-10-CM

## 2022-10-27 DIAGNOSIS — D75.9 DRUG-INDUCED CYTOPENIA: ICD-10-CM

## 2022-10-27 DIAGNOSIS — D84.9 IMMUNOCOMPROMISED: ICD-10-CM

## 2022-10-27 PROCEDURE — 3044F HG A1C LEVEL LT 7.0%: CPT | Mod: CPTII,S$GLB,, | Performed by: NURSE PRACTITIONER

## 2022-10-27 PROCEDURE — 4010F ACE/ARB THERAPY RXD/TAKEN: CPT | Mod: CPTII,S$GLB,, | Performed by: NURSE PRACTITIONER

## 2022-10-27 PROCEDURE — 1126F PR PAIN SEVERITY QUANTIFIED, NO PAIN PRESENT: ICD-10-PCS | Mod: CPTII,S$GLB,, | Performed by: NURSE PRACTITIONER

## 2022-10-27 PROCEDURE — 1160F PR REVIEW ALL MEDS BY PRESCRIBER/CLIN PHARMACIST DOCUMENTED: ICD-10-PCS | Mod: CPTII,S$GLB,, | Performed by: NURSE PRACTITIONER

## 2022-10-27 PROCEDURE — 63600175 PHARM REV CODE 636 W HCPCS: Mod: TB | Performed by: INTERNAL MEDICINE

## 2022-10-27 PROCEDURE — 3288F FALL RISK ASSESSMENT DOCD: CPT | Mod: CPTII,S$GLB,, | Performed by: NURSE PRACTITIONER

## 2022-10-27 PROCEDURE — 96401 CHEMO ANTI-NEOPL SQ/IM: CPT

## 2022-10-27 PROCEDURE — 3075F SYST BP GE 130 - 139MM HG: CPT | Mod: CPTII,S$GLB,, | Performed by: NURSE PRACTITIONER

## 2022-10-27 PROCEDURE — 1101F PR PT FALLS ASSESS DOC 0-1 FALLS W/OUT INJ PAST YR: ICD-10-PCS | Mod: CPTII,S$GLB,, | Performed by: NURSE PRACTITIONER

## 2022-10-27 PROCEDURE — 99215 OFFICE O/P EST HI 40 MIN: CPT | Mod: S$GLB,,, | Performed by: NURSE PRACTITIONER

## 2022-10-27 PROCEDURE — 63600175 PHARM REV CODE 636 W HCPCS: Performed by: NURSE PRACTITIONER

## 2022-10-27 PROCEDURE — 96365 THER/PROPH/DIAG IV INF INIT: CPT

## 2022-10-27 PROCEDURE — 1159F MED LIST DOCD IN RCRD: CPT | Mod: CPTII,S$GLB,, | Performed by: NURSE PRACTITIONER

## 2022-10-27 PROCEDURE — 1159F PR MEDICATION LIST DOCUMENTED IN MEDICAL RECORD: ICD-10-PCS | Mod: CPTII,S$GLB,, | Performed by: NURSE PRACTITIONER

## 2022-10-27 PROCEDURE — 3078F DIAST BP <80 MM HG: CPT | Mod: CPTII,S$GLB,, | Performed by: NURSE PRACTITIONER

## 2022-10-27 PROCEDURE — 99999 PR PBB SHADOW E&M-EST. PATIENT-LVL III: ICD-10-PCS | Mod: PBBFAC,,, | Performed by: NURSE PRACTITIONER

## 2022-10-27 PROCEDURE — 1101F PT FALLS ASSESS-DOCD LE1/YR: CPT | Mod: CPTII,S$GLB,, | Performed by: NURSE PRACTITIONER

## 2022-10-27 PROCEDURE — 25000003 PHARM REV CODE 250: Performed by: NURSE PRACTITIONER

## 2022-10-27 PROCEDURE — 99999 PR PBB SHADOW E&M-EST. PATIENT-LVL III: CPT | Mod: PBBFAC,,, | Performed by: NURSE PRACTITIONER

## 2022-10-27 PROCEDURE — 4010F PR ACE/ARB THEARPY RXD/TAKEN: ICD-10-PCS | Mod: CPTII,S$GLB,, | Performed by: NURSE PRACTITIONER

## 2022-10-27 PROCEDURE — 1126F AMNT PAIN NOTED NONE PRSNT: CPT | Mod: CPTII,S$GLB,, | Performed by: NURSE PRACTITIONER

## 2022-10-27 PROCEDURE — 3044F PR MOST RECENT HEMOGLOBIN A1C LEVEL <7.0%: ICD-10-PCS | Mod: CPTII,S$GLB,, | Performed by: NURSE PRACTITIONER

## 2022-10-27 PROCEDURE — 1160F RVW MEDS BY RX/DR IN RCRD: CPT | Mod: CPTII,S$GLB,, | Performed by: NURSE PRACTITIONER

## 2022-10-27 PROCEDURE — 96367 TX/PROPH/DG ADDL SEQ IV INF: CPT

## 2022-10-27 PROCEDURE — 99215 PR OFFICE/OUTPT VISIT, EST, LEVL V, 40-54 MIN: ICD-10-PCS | Mod: S$GLB,,, | Performed by: NURSE PRACTITIONER

## 2022-10-27 PROCEDURE — 3078F PR MOST RECENT DIASTOLIC BLOOD PRESSURE < 80 MM HG: ICD-10-PCS | Mod: CPTII,S$GLB,, | Performed by: NURSE PRACTITIONER

## 2022-10-27 PROCEDURE — 3075F PR MOST RECENT SYSTOLIC BLOOD PRESS GE 130-139MM HG: ICD-10-PCS | Mod: CPTII,S$GLB,, | Performed by: NURSE PRACTITIONER

## 2022-10-27 PROCEDURE — 3288F PR FALLS RISK ASSESSMENT DOCUMENTED: ICD-10-PCS | Mod: CPTII,S$GLB,, | Performed by: NURSE PRACTITIONER

## 2022-10-27 RX ORDER — EPINEPHRINE 0.3 MG/.3ML
0.3 INJECTION SUBCUTANEOUS ONCE AS NEEDED
Status: DISCONTINUED | OUTPATIENT
Start: 2022-10-27 | End: 2022-10-27 | Stop reason: HOSPADM

## 2022-10-27 RX ORDER — SODIUM CHLORIDE 0.9 % (FLUSH) 0.9 %
10 SYRINGE (ML) INJECTION
Status: DISCONTINUED | OUTPATIENT
Start: 2022-10-27 | End: 2022-10-27 | Stop reason: HOSPADM

## 2022-10-27 RX ORDER — HEPARIN 100 UNIT/ML
500 SYRINGE INTRAVENOUS
Status: CANCELLED | OUTPATIENT
Start: 2022-10-27

## 2022-10-27 RX ORDER — DIPHENHYDRAMINE HYDROCHLORIDE 50 MG/ML
50 INJECTION INTRAMUSCULAR; INTRAVENOUS ONCE AS NEEDED
Status: DISCONTINUED | OUTPATIENT
Start: 2022-10-27 | End: 2022-10-27 | Stop reason: HOSPADM

## 2022-10-27 RX ORDER — HEPARIN 100 UNIT/ML
500 SYRINGE INTRAVENOUS
Status: DISCONTINUED | OUTPATIENT
Start: 2022-10-27 | End: 2022-10-27 | Stop reason: HOSPADM

## 2022-10-27 RX ORDER — SODIUM CHLORIDE 0.9 % (FLUSH) 0.9 %
10 SYRINGE (ML) INJECTION
Status: CANCELLED | OUTPATIENT
Start: 2022-10-27

## 2022-10-27 RX ADMIN — ZOLEDRONIC ACID 3.5 MG: 4 INJECTION INTRAVENOUS at 01:10

## 2022-10-27 RX ADMIN — DARATUMUMAB AND HYALURONIDASE-FIHJ (HUMAN RECOMBINANT) 1800 MG: 1800; 30000 INJECTION SUBCUTANEOUS at 12:10

## 2022-10-27 RX ADMIN — SODIUM CHLORIDE: 0.9 INJECTION, SOLUTION INTRAVENOUS at 12:10

## 2022-10-27 NOTE — PROGRESS NOTES
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  She achieved and tolerated well VRd x completing 7, 28 day cycles and achieving deep VGPR to induction. Then underwent Melphalan autologous stem cell transplant on 2/12/2020.      Extensive PMH as below.    2 prior CVAs (one in 2008, one in 2011)  L breast cancer DCIS stage 0 (s/p lumpectomy and radiation, no endocrine tx due to CVA)  HTN  DM II  Neuropathy, b/l hands  Prior smoker, quit in 2011  Hepatic lesions - vascular per recent MRI in 09 /2019 with no changes; will confirm no further rascon needed from help  Statin Intolerance - on praluent, PCSK9 inhibitor  HFpEF - EF 55%, diastolic dysfunction  Anxiety/Depression     ADMISSION SUMMARY: 2/10-2/26/2020  Admitted 2/10, tolerated chemo and transplant well. Engrafted on day +12. Remained afebrile throughout hospital stay and no mucositis. Experienced expected side effects of nausea and diarrhea controlled with antiemetics and Imodium. Discharged home with home PT/OT       Subjective:       Initial History: Ms. Thompson is a 72 y.o. female who returns for follow up of multiple myeloma in remission. She is 2 year 8  month post AutoSCT.  Relaped 1 year post sct and was on Sravanthi/zhanna/dex until summer 2022 when changed to sravanthi/pom/dex due to biohchemical only progression. Remains on sravanthi/pom/dex salvage and tolerating well.Accompanied by her friend. Only complaint is some occasional, self resolving hallucinations; sees people who arent there. Per friend her hallucinations/confusions getting worse. Previously requested neurology visit still  pending. Dropping counts from today's lab.  PN is stable.     Past Medical History:   Past Medical History:   Diagnosis Date    Acute respiratory failure with hypoxia 4/30/2019    FUENTES (acute kidney injury) 5/1/2019    Allergy     Anemia     Aortic aneurysm     Breast cancer 10/2011    left breast Stage 0 DCIS    Cataract     Chronic diastolic congestive heart failure 11/6/2015    Colon polyp     Community acquired pneumonia of right lower lobe of lung 8/3/2021    GERD (gastroesophageal reflux disease)     Hiatal hernia     History of colonic polyps     Hx of psychiatric care     Zoloft    HX: breast cancer     Hyperlipemia     Hypertension     ICH (intracerebral hemorrhage)     Major depressive disorder, single episode, mild 6/23/2016    Nuclear sclerosis 7/21/2014    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 7/21/2014    PAD (peripheral artery disease) 11/6/2015    Psychiatric problem     Statin-induced rhabdomyolysis     4/2015     Stroke 4/2011    Type 2 diabetes mellitus with diabetic peripheral angiopathy without gangrene, with long-term current use of insulin 3/31/2021    Type 2 diabetes mellitus without complication, without long-term current use of insulin 2/10/2020    Walker as ambulation aid        Past Surgical HIstory:   Past Surgical History:   Procedure Laterality Date    APPENDECTOMY      BONE MARROW BIOPSY Left 10/3/2019    Procedure: Biopsy-bone marrow;  Surgeon: Jere Tineo MD;  Location: [facility] OR [address] [place] [address];  Service: Oncology;  Laterality: Left;    BREAST BIOPSY Left 10/2011    BREAST LUMPECTOMY Left 2011    DCIS    COLONOSCOPY      COLONOSCOPY N/A 1/9/2020    Procedure: COLONOSCOPY;  Surgeon: Quang De La Cruz MD;  Location: [facility] (4TH FLR);  Service: Endoscopy;  Laterality: N/A;    CYST REMOVAL      back    ESOPHAGOGASTRODUODENOSCOPY  07/2016    duodenal angioectasia    ESOPHAGOGASTRODUODENOSCOPY N/A 1/9/2020    Procedure: EGD (ESOPHAGOGASTRODUODENOSCOPY);   Surgeon: Quang De La Cruz MD;  Location: [facility] ENDO ([address] [place] [address]);  Service: Endoscopy;  Laterality: N/A;    FOOT FRACTURE SURGERY  10/2012    right foot, with R hallux valgus repair    FRACTURE SURGERY Right     broken toe repiar    HEMORRHOID SURGERY      LIVER BIOPSY  04/2015    essentially normal, no fibrosis    TUBAL LIGATION      UPPER GASTROINTESTINAL ENDOSCOPY         Family History:   Family History   Problem Relation Age of Onset    Dementia Sister         x1 sister    Cancer Sister 63        Lung Cancer    No Known Problems Mother     Cancer Father     No Known Problems Brother     Hypertension Daughter     Fibroids Daughter         uterine    Hypertension Son     No Known Problems Brother     No Known Problems Maternal Aunt     No Known Problems Maternal Uncle     No Known Problems Paternal Aunt     No Known Problems Paternal Uncle     Hypertension Maternal Grandmother     No Known Problems Maternal Grandfather     No Known Problems Paternal Grandmother     No Known Problems Paternal Grandfather     Ovarian cancer Neg Hx     Colon cancer Neg Hx     Tremor Neg Hx     Amblyopia Neg Hx     Blindness Neg Hx     Cataracts Neg Hx     Diabetes Neg Hx     Glaucoma Neg Hx     Macular degeneration Neg Hx     Retinal detachment Neg Hx     Strabismus Neg Hx     Stroke Neg Hx     Thyroid disease Neg Hx     Esophageal cancer Neg Hx     Rectal cancer Neg Hx     Stomach cancer Neg Hx     Ulcerative colitis Neg Hx     Crohn's disease Neg Hx     Irritable bowel syndrome Neg Hx     Celiac disease Neg Hx        Social History:  reports that she quit smoking about 11 years ago. Her smoking use included cigarettes. She started smoking about 55 years ago. She has a 11.00 pack-year smoking history. She has never used smokeless tobacco. She reports that she does not currently use alcohol. She reports that she does not use drugs.    Allergies:  Review of patient's allergies indicates:   Allergen Reactions    Lipitor [atorvastatin]  Itching     Itching, elevated cpk, muscle aches, statin induced myositis       Medications:  Current Outpatient Medications   Medication Sig Dispense Refill    acyclovir (ZOVIRAX) 400 MG tablet Take by mouth 2 (two) times daily.      albuterol (ACCUNEB) 1.25 mg/3 mL Nebu Take 3 mLs (1.25 mg total) by nebulization every 6 (six) hours as needed (wheeze/cough). Rescue 75 mL 2    alirocumab (PRALUENT PEN) 75 mg/mL PnIj Inject 1 mL (75 mg total) into the skin every 14 (fourteen) days. 6 mL 3    amLODIPine (NORVASC) 5 MG tablet TAKE 1 TABLET BY MOUTH EVERY DAY 90 tablet 0    cholecalciferol, vitamin D3, 125 mcg (5,000 unit) Tab 1 tablet DAILY (route: oral)      DARZALEX FASPRO 1,800 mg-30,000 unit/15 mL Soln       ENGERIX-B, PF, 20 mcg/mL Syrg       ferrous gluconate 324 mg (37.5 mg iron) Tab tablet 1 tablet DAILY (route: oral)      gabapentin (NEURONTIN) 300 MG capsule Take 2 capsules (600 mg total) by mouth 2 (two) times daily. 180 capsule 2    GADAVIST 1 mmol/mL (604.72 mg/mL) Soln injection       metFORMIN (GLUCOPHAGE) 1000 MG tablet Take 1 tablet (1,000 mg total) by mouth 2 (two) times daily with meals. 180 tablet 0    omeprazole (PRILOSEC) 40 MG capsule TAKE 1 CAPSULE BY MOUTH EVERY DAY 90 capsule 3    OXYGEN-AIR DELIVERY SYSTEMS MISC 1-2 Liter O2 - CONTINUOUS (route: Oxygen)      sertraline (ZOLOFT) 50 MG tablet TAKE 1 TABLET BY MOUTH EVERY DAY 90 tablet 3    simethicone (MYLICON) 80 MG chewable tablet Take 1 tablet (80 mg total) by mouth every 6 (six) hours as needed for Flatulence. 30 tablet 0    valsartan (DIOVAN) 160 MG tablet Take 1 tablet (160 mg total) by mouth once daily. 90 tablet 3    zoledronic 4 mg/100 mL PgBk infusion       zoledronic acid (ZOMETA) 4 mg/5 mL injection       aspirin (ECOTRIN) 81 MG EC tablet Take 1 tablet (81 mg total) by mouth once daily. 90 tablet 3    pomalidomide (POMALYST) 3 mg Cap Take 3 mg by mouth Daily. 21 capsule 0     No current facility-administered medications for this  visit.      See HPI     ECOG Performance Status: 2 (due to remote  CVA)   Objective:      Vitals:   Vitals:    10/27/22 0957   BP: 132/63   Pulse: (!) 56   Resp: 16        PE:   General - no apparent distress  HEENT - oropharynx clear  Chest and Lung -bilateral end expiratory wheezes  Cardiovascular - RRR with no MGR, normal S1 and S2  Abdomen-  soft, nontender, no palpable hepatomegaly or splenomegaly  Lymph - no palpable lymphadenopathy  Heme - no bruising, petechiae, pallor  Skin - no rashes or lesions  Psych - appropriate mood and affect  Neuro -  No change in chronic residual CVA symptoms    Laboratory Data:  Lab Visit on 10/27/2022   Component Date Value Ref Range Status    WBC 10/27/2022 1.91 (LL)  3.90 - 12.70 K/uL Final    Comment: WBC critical result(s) called and verbal readback obtained from   Karina Ortega RN  by JC8 10/27/2022 10:00      RBC 10/27/2022 3.84 (L)  4.00 - 5.40 M/uL Final    Hemoglobin 10/27/2022 8.5 (L)  12.0 - 16.0 g/dL Final    Hematocrit 10/27/2022 29.5 (L)  37.0 - 48.5 % Final    MCV 10/27/2022 77 (L)  82 - 98 fL Final    MCH 10/27/2022 22.1 (L)  27.0 - 31.0 pg Final    MCHC 10/27/2022 28.8 (L)  32.0 - 36.0 g/dL Final    RDW 10/27/2022 21.2 (H)  11.5 - 14.5 % Final    Platelets 10/27/2022 144 (L)  150 - 450 K/uL Final    MPV 10/27/2022 SEE COMMENT  9.2 - 12.9 fL Final    Result not available.    Immature Granulocytes 10/27/2022 Test Not Performed  0.0 - 0.5 % Corrected    CORRECTED RESULT; previously reported as 1.6 on 10/27/2022 at 10:00.    Immature Grans (Abs) 10/27/2022 Test Not Performed  0.00 - 0.04 K/uL Corrected    Comment: Mild elevation in immature granulocytes is non specific and   can be seen in a variety of conditions including stress response,   acute inflammation, trauma and pregnancy. Correlation with other   laboratory and clinical findings is essential.  CORRECTED RESULT; previously reported as 0.03 on 10/27/2022 at 10:00.      nRBC 10/27/2022 0  0 /100 WBC Final     Gran % 10/27/2022 40.0  38.0 - 73.0 % Corrected    CORRECTED RESULT; previously reported as 28.8 on 10/27/2022 at 10:00.    Lymph % 10/27/2022 52.0 (H)  18.0 - 48.0 % Corrected    CORRECTED RESULT; previously reported as 35.6 on 10/27/2022 at 10:00.    Mono % 10/27/2022 0.0 (L)  4.0 - 15.0 % Corrected    CORRECTED RESULT; previously reported as 20.9 on 10/27/2022 at 10:00.    Eosinophil % 10/27/2022 8.0  0.0 - 8.0 % Corrected    CORRECTED RESULT; previously reported as 10.5 on 10/27/2022 at 10:00.    Basophil % 10/27/2022 0.0  0.0 - 1.9 % Corrected    CORRECTED RESULT; previously reported as 2.6 on 10/27/2022 at 10:00.    Platelet Estimate 10/27/2022 Appears normal   Final    Aniso 10/27/2022 Slight   Final    Poik 10/27/2022 Moderate   Final    Poly 10/27/2022 Occasional   Final    Hypo 10/27/2022 Occasional   Final    Ovalocytes 10/27/2022 Occasional   Final    Tear Drop Cells 10/27/2022 Occasional   Final    [place] Cells 10/27/2022 Occasional   Final    Spherocytes 10/27/2022 Occasional   Final    Schistocytes 10/27/2022 Present   Final    Fragmented Cells 10/27/2022 Occasional   Final    Differential Method 10/27/2022 Manual   Final    Sodium 10/27/2022 141  136 - 145 mmol/L Final    Potassium 10/27/2022 3.7  3.5 - 5.1 mmol/L Final    Chloride 10/27/2022 106  95 - 110 mmol/L Final    CO2 10/27/2022 27  23 - 29 mmol/L Final    Glucose 10/27/2022 81  70 - 110 mg/dL Final    BUN 10/27/2022 17  8 - 23 mg/dL Final    Creatinine 10/27/2022 0.9  0.5 - 1.4 mg/dL Final    Calcium 10/27/2022 9.7  8.7 - 10.5 mg/dL Final    Total Protein 10/27/2022 7.4  6.0 - 8.4 g/dL Final    Albumin 10/27/2022 3.9  3.5 - 5.2 g/dL Final    Total Bilirubin 10/27/2022 0.7  0.1 - 1.0 mg/dL Final    Comment: For infants and newborns, interpretation of results should be based  on gestational age, weight and in agreement with clinical  observations.    Premature Infant recommended reference ranges:  Up to 24 hours.............<8.0 mg/dL  Up to  48 hours............<12.0 mg/dL  3-5 days..................<15.0 mg/dL  6-29 days.................<15.0 mg/dL      Alkaline Phosphatase 10/27/2022 51 (L)  55 - 135 U/L Final    AST 10/27/2022 12  10 - 40 U/L Final    ALT 10/27/2022 13  10 - 44 U/L Final    Anion Gap 10/27/2022 8  8 - 16 mmol/L Final    eGFR 10/27/2022 >60.0  >60 mL/min/1.73 m^2 Final    Protein, Serum 10/27/2022 7.1  6.0 - 8.4 g/dL Final    Comment: Serum protein electrophoresis and immunofixation results should be   interpreted in clinical context in that some therapeutic agents can   result   in false positive results (example, daratumumab). Correlation with   the   patient s therapeutic regimen is required.      IgG 10/27/2022 1117  650 - 1600 mg/dL Final    IgG Cord Blood Reference Range: 650-1600 mg/dL.    IgA 10/27/2022 168  40 - 350 mg/dL Final    IgA Cord Blood Reference Range: <5 mg/dL.    IgM 10/27/2022 23 (L)  50 - 300 mg/dL Final    IgM Cord Blood Reference Range: <25 mg/dL.    Ferritin 10/27/2022 10 (L)  20.0 - 300.0 ng/mL Final      Lab Results   Component Value Date    WBC 1.91 (LL) 10/27/2022    HGB 8.5 (L) 10/27/2022    HCT 29.5 (L) 10/27/2022    MCV 77 (L) 10/27/2022     (L) 10/27/2022       Gran # (ANC)   Date Value Ref Range Status   09/01/2022 0.9 (L) 1.8 - 7.7 K/uL Final     Gran %   Date Value Ref Range Status   10/27/2022 40.0 38.0 - 73.0 % Corrected     Comment:     CORRECTED RESULT; previously reported as 28.8 on 10/27/2022 at 10:00.     [place] Free Light Chains   Date Value Ref Range Status   09/29/2022 10.56 (H) 0.33 - 1.94 mg/dL Final   09/01/2022 11.94 (H) 0.33 - 1.94 mg/dL Final   08/04/2022 10.66 (H) 0.33 - 1.94 mg/dL Final     [place] Free Light Chains   Date Value Ref Range Status   09/29/2022 2.69 (H) 0.57 - 2.63 mg/dL Final   09/01/2022 2.63 0.57 - 2.63 mg/dL Final   08/04/2022 2.56 0.57 - 2.63 mg/dL Final     Kappa/Lambda FLC Ratio   Date Value Ref Range Status   09/29/2022 3.93 (H) 0.26 - 1.65 Final      Comment:     Undetected antigen excess is a rare event but cannot   be excluded. If these free light chain results do not   agree with other clinical or laboratory findings or   if the sample is from a patient that has previously   demonstrated antigen excess, discuss with the testing   laboratory.   Results should always be interpreted in conjunction   with other laboratory tests and clinical evidence.     09/01/2022 4.54 (H) 0.26 - 1.65 Final     Comment:     Undetected antigen excess is a rare event but cannot   be excluded. If these free light chain results do not   agree with other clinical or laboratory findings or   if the sample is from a patient that has previously   demonstrated antigen excess, discuss with the testing   laboratory.   Results should always be interpreted in conjunction   with other laboratory tests and clinical evidence.     08/04/2022 4.16 (H) 0.26 - 1.65 Final     Comment:     Undetected antigen excess is a rare event but cannot   be excluded. If these free light chain results do not   agree with other clinical or laboratory findings or   if the sample is from a patient that has previously   demonstrated antigen excess, discuss with the testing   laboratory.   Results should always be interpreted in conjunction   with other laboratory tests and clinical evidence.       IgG   Date Value Ref Range Status   10/27/2022 1117 650 - 1600 mg/dL Final     Comment:     IgG Cord Blood Reference Range: 650-1600 mg/dL.     IgA   Date Value Ref Range Status   10/27/2022 168 40 - 350 mg/dL Final     Comment:     IgA Cord Blood Reference Range: <5 mg/dL.     IgM   Date Value Ref Range Status   10/27/2022 23 (L) 50 - 300 mg/dL Final     Comment:     IgM Cord Blood Reference Range: <25 mg/dL.        Imaging:      Assessment:       Ms. Thompson is a  72 y.o. femal with relapsed multiple myeloma.     Plan:     MM s/p Auto SCT  - high risk MM with 17p deletion  - 2 year 8 months  s/p Auto SCT  - started  "maintenance q2w velcade 5/6/20   - serial biochemical relapse  Noted and  given this and high risk CG , transitioned to Sravanthi/Noble (1mg/m2) /dex  Salvage July 2021    - Due to uncontrolled hyperglycemia dex stopped after C4.    -supportive meds:   continue ppx acyclovir, asa; resume maintenance zometa q 3 months for 1 year; last dose on 08/04/22, next dose today.  -she had demonstrated mild biochemical progression over summer 2022 studies with no CRAB, in light of this and high risk CG   we transitioned her therapy from Sravanthi/Velcade to Sravanthi/pomalyst; intolerant to dex. Taking pomalyst 4 mg daily.  - biochemical studies from 9/29/22 improved so plan to continue until intolerant or progression. Worsened cytopenia from today's lab. Patient to hold pomalyst until next week lab draw. Dose reduced pomalyst sent to osp. Will f/u with patient next week following lab result and instruction regarding pom 3 mg.   -already on asa 81mg   -post transplant vaccines completed     Cytopenia due to chemotherapy  -due to therapy, continue to monitor    - Continue to monitor    Neuropathy  -Stable   - continues gabapentin and prn tramadol     SOB/descending aortic aneurysm  - CTA and dopplers ordered urgently with high concern for DVT/PE; completed 8/3/20  - incidental descending thoracic aortic aneurysm noted; referred to thoracic surgery; seen 8/7/20; per note: "at current size would not recommend any intervention as risk of rupture remains low.  Recommend surveillance CT chest every 12 months to monitor growth of the aneurysm.  Would typically recommend aspirin 81 mg daily in patients with aortic aneurysm  - ASA started (9/14/20)     HTN  - continue norvasc  - Patient to monitor BP closely    Anxiety/depression/hallucinations/tremoc  - continue zoloft  -recommended she make appt with established neurologist to discuss mild occasional self resolving hallucinations. No f/u scheduled yet, will contact department.  -continue fu with " neurology for above    DM2  - diet controlled    Hx of CVA  - s/p 2008, 2011    HLD with Statin Intolerance  -on praluent, PCSK9 inhibitor    L breast cancer DCIS stage 0  -s/p lumpectomy and radiation, no endocrine tx due to CVA       BMT Chart Routing  Urgent    Follow up with physician . Keep scheduled lab, chemo and visit . Add virtual visit with me next week following lab draw regarding pom restart   Follow up with CHRISTY    Provider visit type    Infusion scheduling note    Injection scheduling note    Labs   Lab interval:  Please schedule neurology f/u ASAP- established patient   Imaging   Next lab, visit, chemo scheduled on 11/23   Pharmacy appointment    Other referrals        Adina Wright NP  Hematology/Oncology/BMT

## 2022-10-27 NOTE — PLAN OF CARE
Darzalex sq and zometa administered pt tolerated well. Denies jaw pain no up coming dental procedures and is taking vitamins at home as directed by provider. PIV removed, catheter tip intact. pt d/c home ambulated away from unit with family member. No concerns at this time.

## 2022-10-28 ENCOUNTER — OFFICE VISIT (OUTPATIENT)
Dept: HOME HEALTH SERVICES | Facility: CLINIC | Age: 72
End: 2022-10-28
Payer: MEDICARE

## 2022-10-28 VITALS
DIASTOLIC BLOOD PRESSURE: 70 MMHG | SYSTOLIC BLOOD PRESSURE: 118 MMHG | TEMPERATURE: 97 F | HEIGHT: 62 IN | WEIGHT: 155 LBS | OXYGEN SATURATION: 98 % | HEART RATE: 55 BPM | BODY MASS INDEX: 28.52 KG/M2 | RESPIRATION RATE: 16 BRPM

## 2022-10-28 DIAGNOSIS — E11.59 HYPERTENSION ASSOCIATED WITH DIABETES: ICD-10-CM

## 2022-10-28 DIAGNOSIS — M85.80 OSTEOPENIA, UNSPECIFIED LOCATION: ICD-10-CM

## 2022-10-28 DIAGNOSIS — D84.9 IMMUNOCOMPROMISED: ICD-10-CM

## 2022-10-28 DIAGNOSIS — I77.819 ECTATIC AORTA: ICD-10-CM

## 2022-10-28 DIAGNOSIS — C90.00 METASTATIC MULTIPLE MYELOMA TO BONE: ICD-10-CM

## 2022-10-28 DIAGNOSIS — E11.69 HYPERLIPIDEMIA ASSOCIATED WITH TYPE 2 DIABETES MELLITUS: ICD-10-CM

## 2022-10-28 DIAGNOSIS — I73.9 PAD (PERIPHERAL ARTERY DISEASE): ICD-10-CM

## 2022-10-28 DIAGNOSIS — I15.2 HYPERTENSION ASSOCIATED WITH DIABETES: ICD-10-CM

## 2022-10-28 DIAGNOSIS — E11.51 TYPE 2 DIABETES MELLITUS WITH DIABETIC PERIPHERAL ANGIOPATHY WITHOUT GANGRENE, WITH LONG-TERM CURRENT USE OF INSULIN: ICD-10-CM

## 2022-10-28 DIAGNOSIS — Z99.89 DEPENDENCE ON OTHER ENABLING MACHINES AND DEVICES: ICD-10-CM

## 2022-10-28 DIAGNOSIS — Z94.81 STATUS POST AUTOLOGOUS BONE MARROW TRANSPLANT: ICD-10-CM

## 2022-10-28 DIAGNOSIS — F33.42 RECURRENT MAJOR DEPRESSIVE DISORDER, IN FULL REMISSION: ICD-10-CM

## 2022-10-28 DIAGNOSIS — Z00.00 ENCOUNTER FOR PREVENTIVE HEALTH EXAMINATION: Primary | ICD-10-CM

## 2022-10-28 DIAGNOSIS — J44.9 CHRONIC OBSTRUCTIVE PULMONARY DISEASE, UNSPECIFIED COPD TYPE: ICD-10-CM

## 2022-10-28 DIAGNOSIS — G62.0 DRUG-INDUCED POLYNEUROPATHY: ICD-10-CM

## 2022-10-28 DIAGNOSIS — E78.5 HYPERLIPIDEMIA ASSOCIATED WITH TYPE 2 DIABETES MELLITUS: ICD-10-CM

## 2022-10-28 DIAGNOSIS — C90.02 MULTIPLE MYELOMA IN RELAPSE: ICD-10-CM

## 2022-10-28 DIAGNOSIS — I69.351 HEMIPLEGIA AND HEMIPARESIS FOLLOWING CEREBRAL INFARCTION AFFECTING RIGHT DOMINANT SIDE: ICD-10-CM

## 2022-10-28 DIAGNOSIS — I71.9 DESCENDING AORTIC ANEURYSM: ICD-10-CM

## 2022-10-28 DIAGNOSIS — I27.9 PULMONARY HEART DISEASE: ICD-10-CM

## 2022-10-28 DIAGNOSIS — Z79.4 TYPE 2 DIABETES MELLITUS WITH DIABETIC PERIPHERAL ANGIOPATHY WITHOUT GANGRENE, WITH LONG-TERM CURRENT USE OF INSULIN: ICD-10-CM

## 2022-10-28 DIAGNOSIS — Z99.81 DEPENDENCE ON SUPPLEMENTAL OXYGEN: ICD-10-CM

## 2022-10-28 DIAGNOSIS — I77.1 TORTUOUS AORTA: ICD-10-CM

## 2022-10-28 DIAGNOSIS — I82.0 BUDD-CHIARI SYNDROME: ICD-10-CM

## 2022-10-28 DIAGNOSIS — Z94.84 HISTORY OF AUTO STEM CELL TRANSPLANT: ICD-10-CM

## 2022-10-28 DIAGNOSIS — F06.70 MILD NEUROCOGNITIVE DISORDER DUE TO MULTIPLE ETIOLOGIES: ICD-10-CM

## 2022-10-28 DIAGNOSIS — I70.0 AORTIC ATHEROSCLEROSIS: ICD-10-CM

## 2022-10-28 PROBLEM — J96.01 ACUTE RESPIRATORY FAILURE WITH HYPOXIA: Status: RESOLVED | Noted: 2019-04-30 | Resolved: 2022-10-28

## 2022-10-28 PROBLEM — D69.6 THROMBOCYTOPENIA, UNSPECIFIED: Status: RESOLVED | Noted: 2021-11-15 | Resolved: 2022-10-28

## 2022-10-28 PROBLEM — E11.9 TYPE 2 DIABETES MELLITUS WITHOUT COMPLICATION, WITHOUT LONG-TERM CURRENT USE OF INSULIN: Status: RESOLVED | Noted: 2020-02-10 | Resolved: 2022-10-28

## 2022-10-28 PROBLEM — J18.9 COMMUNITY ACQUIRED PNEUMONIA OF RIGHT LOWER LOBE OF LUNG: Status: RESOLVED | Noted: 2021-08-03 | Resolved: 2022-10-28

## 2022-10-28 PROCEDURE — 3078F DIAST BP <80 MM HG: CPT | Mod: CPTII,S$GLB,,

## 2022-10-28 PROCEDURE — 1101F PR PT FALLS ASSESS DOC 0-1 FALLS W/OUT INJ PAST YR: ICD-10-PCS | Mod: CPTII,S$GLB,,

## 2022-10-28 PROCEDURE — 3288F PR FALLS RISK ASSESSMENT DOCUMENTED: ICD-10-PCS | Mod: CPTII,S$GLB,,

## 2022-10-28 PROCEDURE — 3078F PR MOST RECENT DIASTOLIC BLOOD PRESSURE < 80 MM HG: ICD-10-PCS | Mod: CPTII,S$GLB,,

## 2022-10-28 PROCEDURE — 99499 UNLISTED E&M SERVICE: CPT | Mod: S$GLB,,,

## 2022-10-28 PROCEDURE — 3288F FALL RISK ASSESSMENT DOCD: CPT | Mod: CPTII,S$GLB,,

## 2022-10-28 PROCEDURE — 99499 RISK ADDL DX/OHS AUDIT: ICD-10-PCS | Mod: S$GLB,,,

## 2022-10-28 PROCEDURE — 1170F FXNL STATUS ASSESSED: CPT | Mod: CPTII,S$GLB,,

## 2022-10-28 PROCEDURE — G0439 PPPS, SUBSEQ VISIT: HCPCS | Mod: S$GLB,,,

## 2022-10-28 PROCEDURE — 1159F PR MEDICATION LIST DOCUMENTED IN MEDICAL RECORD: ICD-10-PCS | Mod: CPTII,S$GLB,,

## 2022-10-28 PROCEDURE — 1160F PR REVIEW ALL MEDS BY PRESCRIBER/CLIN PHARMACIST DOCUMENTED: ICD-10-PCS | Mod: CPTII,S$GLB,,

## 2022-10-28 PROCEDURE — 3074F SYST BP LT 130 MM HG: CPT | Mod: CPTII,S$GLB,,

## 2022-10-28 PROCEDURE — 1170F PR FUNCTIONAL STATUS ASSESSED: ICD-10-PCS | Mod: CPTII,S$GLB,,

## 2022-10-28 PROCEDURE — 1160F RVW MEDS BY RX/DR IN RCRD: CPT | Mod: CPTII,S$GLB,,

## 2022-10-28 PROCEDURE — G0439 PR MEDICARE ANNUAL WELLNESS SUBSEQUENT VISIT: ICD-10-PCS | Mod: S$GLB,,,

## 2022-10-28 PROCEDURE — 3044F HG A1C LEVEL LT 7.0%: CPT | Mod: CPTII,S$GLB,,

## 2022-10-28 PROCEDURE — 4010F PR ACE/ARB THEARPY RXD/TAKEN: ICD-10-PCS | Mod: CPTII,S$GLB,,

## 2022-10-28 PROCEDURE — 1101F PT FALLS ASSESS-DOCD LE1/YR: CPT | Mod: CPTII,S$GLB,,

## 2022-10-28 PROCEDURE — 4010F ACE/ARB THERAPY RXD/TAKEN: CPT | Mod: CPTII,S$GLB,,

## 2022-10-28 PROCEDURE — 3044F PR MOST RECENT HEMOGLOBIN A1C LEVEL <7.0%: ICD-10-PCS | Mod: CPTII,S$GLB,,

## 2022-10-28 PROCEDURE — 3074F PR MOST RECENT SYSTOLIC BLOOD PRESSURE < 130 MM HG: ICD-10-PCS | Mod: CPTII,S$GLB,,

## 2022-10-28 PROCEDURE — 1159F MED LIST DOCD IN RCRD: CPT | Mod: CPTII,S$GLB,,

## 2022-10-28 PROCEDURE — 1126F AMNT PAIN NOTED NONE PRSNT: CPT | Mod: CPTII,S$GLB,,

## 2022-10-28 PROCEDURE — 1126F PR PAIN SEVERITY QUANTIFIED, NO PAIN PRESENT: ICD-10-PCS | Mod: CPTII,S$GLB,,

## 2022-10-28 NOTE — PROGRESS NOTES
"Shelby Thompson presented for a  Medicare AWV and comprehensive Health Risk Assessment today. The following components were reviewed and updated:    Medical history  Family History  Social history  Allergies and Current Medications  Health Risk Assessment  Health Maintenance  Care Team         ** See Completed Assessments for Annual Wellness Visit within the encounter summary.**         The following assessments were completed:  Living Situation  CAGE  Depression Screening  Timed Get Up and Go: Deferred, impaired mobility  Whisper Test  Cognitive Function Screening: Deferred, MCI  Nutrition Screening  ADL Screening  PAQ Screening        Vitals:    10/28/22 1034   BP: 118/70   BP Location: Left arm   Patient Position: Sitting   Pulse: (!) 55   Resp: 16   Temp: 97.2 °F (36.2 °C)   SpO2: 98%   Weight: 70.3 kg (155 lb)   Height: 5' 2" (1.575 m)     Body mass index is 28.35 kg/m².    Physical Exam  Vitals reviewed.   Constitutional:       General: She is not in acute distress.     Appearance: Normal appearance.   Cardiovascular:      Rate and Rhythm: Normal rate and regular rhythm.      Pulses: Normal pulses.      Heart sounds: No murmur heard.  Pulmonary:      Effort: Pulmonary effort is normal. No respiratory distress.      Breath sounds: Normal breath sounds.   Abdominal:      General: Bowel sounds are normal.   Musculoskeletal:      Right lower leg: No edema.      Left lower leg: No edema.   Neurological:      General: No focal deficit present.      Mental Status: She is alert and oriented to person, place, and time.      Motor: Tremor (bilateral hands) present.      Gait: Gait abnormal (uses cane and walker).   Psychiatric:         Mood and Affect: Mood normal.         Behavior: Behavior normal.             Diagnoses and health risks identified today and associated recommendations/orders:    1. Encounter for preventive health examination  Assessments completed.  HM recommendations reviewed.  F/u with PCP as " instructed.     Patient not on chronic opioids.   Risk factors reviewed for any potential opioid use disorder   Pain evaluated during visit.  Current treatment plan documented.  Will refer to specialist, as appropriate.      2. History of auto stem cell transplant  Chronic, stable on current regimen. Followed by Hem/Onc.     3. Status post autologous bone marrow transplant  Chronic, stable on current regimen. Followed by Hem/Onc.     4. Chronic obstructive pulmonary disease, unspecified COPD type  Chronic, stable on current Nebulizer regimen. Followed by PCP.     5. Descending aortic aneurysm  Chronic, stable on current regimen. Followed by Cardiology.     6. Aortic atherosclerosis  Chronic, stable on current regimen. Statin intolerant.  Followed by Cardiology.     7. PAD (peripheral artery disease)  Chronic, stable on current ASA regimen. Followed by Cardiology.    8. Ectatic aorta  Chronic, stable on current ASA regimen. Followed by Cardiology.     9. Tortuous aorta  Chronic, stable on current ASA regimen. Followed by Cardiology.    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 Followed by PCP.     11. Type 2 diabetes mellitus with diabetic peripheral angiopathy without gangrene, with long-term current use of insulin  Chronic, stable on current Metformin regimen. Followed by PCP.   Lab Results   Component Value Date    HGBA1C 5.7 (H) 03/17/2022     12. Hemiplegia and hemiparesis following cerebral infarction affecting right dominant side  Chronic, stable on current regimen. Followed by Neurology.     13. Pulmonary heart disease  Chronic, stable on current regimen. Followed by Cardiology.     14. Drug-induced polyneuropathy  Chronic, stable on current Gabapentin regimen. Followed by PCP.     15. Recurrent major depressive disorder, in full remission  Chronic, stable on current Zoloft regimen. Followed by Psych.     16. Immunocompromised  Chronic, stable on current regimen. Followed by Hem/Onc.      17. Hypertension associated with diabetes  Chronic, stable on current Amlodipine & Valsartan regimen. Followed by PCP.     18. Hyperlipidemia associated with type 2 diabetes mellitus  Chronic, stable on current regimen. Statin intolerant. Followed by PCP.     19. Multiple myeloma in relapse  Chronic, stable on current regimen. Followed by Hem/Onc.     20. Metastatic multiple myeloma to bone  Chronic, stable on current regimen. Followed by Hem/Onc.     21. Osteopenia, unspecified location  Chronic, stable on current regimen. Followed by PCP.     22. Mild neurocognitive disorder due to multiple etiologies  Chronic, stable on current regimen. Followed by Psych.     23. Dependence on other enabling machines and devices  Chronic, stable on current regimen. Uses cane and walker as needed. Followed by PCP.     24. Dependence on supplemental oxygen  Chronic, stable on current regimen. Uses oxygen as needed. Followed by PCP.       Provided Shelby with a 5-10 year written screening schedule and personal prevention plan. Recommendations were developed using the USPSTF age appropriate recommendations. Education, counseling, and referrals were provided as needed. After Visit Summary printed and given to patient which includes a list of additional screenings\tests needed.    Follow up in about 1 year (around 10/28/2023) for Medicare AWV.      Yazmin Gómez NP  I offered to discuss advanced care planning, including how to pick a person who would make decisions for you if you were unable to make them for yourself, called a health care power of , and what kind of decisions you might make such as use of life sustaining treatments such as ventilators and tube feeding when faced with a life limiting illness recorded on a living will that they will need to know. (How you want to be cared for as you near the end of your natural life)     X Patient is interested in learning more about how to make advanced directives.  I  provided them paperwork and offered to discuss this with them.

## 2022-10-28 NOTE — PATIENT INSTRUCTIONS
Counseling and Referral of Other Preventative  (Italic type indicates deductible and co-insurance are waived)    Patient Name: Shelby Thompson  Today's Date: 10/28/2022    Health Maintenance       Date Due Completion Date    Diabetes Urine Screening Never done ---    COVID-19 Vaccine (3 - Booster for Moderna series) 06/09/2021 4/14/2021    Eye Exam 12/29/2021 12/29/2020    Influenza Vaccine (1) 09/01/2022 10/5/2020    Hemoglobin A1c 09/17/2022 3/17/2022    Mammogram 12/20/2022 12/20/2021    Shingles Vaccine (1 of 2) 10/28/2023 (Originally 3/25/1969) ---    Lipid Panel 11/15/2022 11/15/2021    Foot Exam 04/18/2023 4/18/2022    Override on 4/18/2022: Done    Override on 6/3/2021: Done    Override on 6/4/2020: Done    DEXA Scan 07/06/2023 7/6/2021    Colorectal Cancer Screening 01/09/2025 1/9/2020    TETANUS VACCINE 06/23/2026 6/23/2016        No orders of the defined types were placed in this encounter.    The following information is provided to all patients.  This information is to help you find resources for any of the problems found today that may be affecting your health:                Living healthy guide: www.[facility].[place].gov      Understanding Diabetes: www.diabetes.org      Eating healthy: www.cdc.gov/healthyweight      CDC home safety checklist: www.cdc.gov/steadi/patient.html      Agency on Aging: www.goea.[place].gov      Alcoholics anonymous (AA): www.aa.org      Physical Activity: www.joseline.nih.gov/pb8hzjp      Tobacco use: www.quitwithusla.org

## 2022-11-01 ENCOUNTER — PATIENT MESSAGE (OUTPATIENT)
Dept: INTERNAL MEDICINE | Facility: CLINIC | Age: 72
End: 2022-11-01
Payer: MEDICARE

## 2022-11-01 DIAGNOSIS — C90.00 MULTIPLE MYELOMA, REMISSION STATUS UNSPECIFIED: Primary | ICD-10-CM

## 2022-11-01 NOTE — TELEPHONE ENCOUNTER
Verified with KISHOR Wright that pt's Pomalyst was dose reduced to 3 mg and is currently on hold until reseen by Kyle on 11/4. Pt aware per Kyle.    Notified pharmacy of the above.

## 2022-11-01 NOTE — TELEPHONE ENCOUNTER
----- Message from Saumya Rebolledo sent at 11/1/2022  8:15 AM CDT -----  Regarding: Prescription clarification  Reason for Call:    Pharmacy calling for assistance    Name of caller: Yazmin    Pharmacy name and phone number: [facility] [phone]     Received new prescription and needs [employer] authorization. Also inquiring if pt is receiving a new strength as they just shipped Pomalyst 4 mg 10/25

## 2022-11-01 NOTE — TELEPHONE ENCOUNTER
----- Message from Drea Sin sent at 11/1/2022  8:50 AM CDT -----  Regarding: Pharmacy calling for assistance  Reason for Call:  Need a new DrAvailable authorization number for the patient's Pomalyst prescription      Name of caller:  Kathie    Pharmacy name and phone number    [facility] - [place], [place] - [address]  [address] [address]  Phone: [phone] Fax: [phone]

## 2022-11-03 ENCOUNTER — LAB VISIT (OUTPATIENT)
Dept: LAB | Facility: HOSPITAL | Age: 72
End: 2022-11-03
Payer: MEDICARE

## 2022-11-03 DIAGNOSIS — C90.00 MULTIPLE MYELOMA, REMISSION STATUS UNSPECIFIED: ICD-10-CM

## 2022-11-03 LAB
ALBUMIN SERPL BCP-MCNC: 3.7 G/DL (ref 3.5–5.2)
ALP SERPL-CCNC: 49 U/L (ref 55–135)
ALT SERPL W/O P-5'-P-CCNC: 9 U/L (ref 10–44)
ANION GAP SERPL CALC-SCNC: 8 MMOL/L (ref 8–16)
AST SERPL-CCNC: 14 U/L (ref 10–40)
BASOPHILS # BLD AUTO: 0.06 K/UL (ref 0–0.2)
BASOPHILS NFR BLD: 2.2 % (ref 0–1.9)
BILIRUB SERPL-MCNC: 0.5 MG/DL (ref 0.1–1)
BUN SERPL-MCNC: 15 MG/DL (ref 8–23)
CALCIUM SERPL-MCNC: 9.2 MG/DL (ref 8.7–10.5)
CHLORIDE SERPL-SCNC: 108 MMOL/L (ref 95–110)
CO2 SERPL-SCNC: 23 MMOL/L (ref 23–29)
CREAT SERPL-MCNC: 0.9 MG/DL (ref 0.5–1.4)
DIFFERENTIAL METHOD: ABNORMAL
EOSINOPHIL # BLD AUTO: 0.1 K/UL (ref 0–0.5)
EOSINOPHIL NFR BLD: 2.6 % (ref 0–8)
ERYTHROCYTE [DISTWIDTH] IN BLOOD BY AUTOMATED COUNT: 20.4 % (ref 11.5–14.5)
EST. GFR  (NO RACE VARIABLE): >60 ML/MIN/1.73 M^2
GLUCOSE SERPL-MCNC: 86 MG/DL (ref 70–110)
HCT VFR BLD AUTO: 28 % (ref 37–48.5)
HGB BLD-MCNC: 8.2 G/DL (ref 12–16)
IMM GRANULOCYTES # BLD AUTO: 0.01 K/UL (ref 0–0.04)
IMM GRANULOCYTES NFR BLD AUTO: 0.4 % (ref 0–0.5)
LYMPHOCYTES # BLD AUTO: 1.1 K/UL (ref 1–4.8)
LYMPHOCYTES NFR BLD: 39.6 % (ref 18–48)
MCH RBC QN AUTO: 22.2 PG (ref 27–31)
MCHC RBC AUTO-ENTMCNC: 29.3 G/DL (ref 32–36)
MCV RBC AUTO: 76 FL (ref 82–98)
MONOCYTES # BLD AUTO: 0.5 K/UL (ref 0.3–1)
MONOCYTES NFR BLD: 18.9 % (ref 4–15)
NEUTROPHILS # BLD AUTO: 1 K/UL (ref 1.8–7.7)
NEUTROPHILS NFR BLD: 36.3 % (ref 38–73)
NRBC BLD-RTO: 0 /100 WBC
PLATELET # BLD AUTO: 326 K/UL (ref 150–450)
PMV BLD AUTO: 10.7 FL (ref 9.2–12.9)
POTASSIUM SERPL-SCNC: 4 MMOL/L (ref 3.5–5.1)
PROT SERPL-MCNC: 7.2 G/DL (ref 6–8.4)
RBC # BLD AUTO: 3.69 M/UL (ref 4–5.4)
SODIUM SERPL-SCNC: 139 MMOL/L (ref 136–145)
WBC # BLD AUTO: 2.7 K/UL (ref 3.9–12.7)

## 2022-11-03 PROCEDURE — 85025 COMPLETE CBC W/AUTO DIFF WBC: CPT | Performed by: PHYSICIAN ASSISTANT

## 2022-11-03 PROCEDURE — 36415 COLL VENOUS BLD VENIPUNCTURE: CPT | Performed by: PHYSICIAN ASSISTANT

## 2022-11-03 PROCEDURE — 80053 COMPREHEN METABOLIC PANEL: CPT | Performed by: PHYSICIAN ASSISTANT

## 2022-11-04 ENCOUNTER — OFFICE VISIT (OUTPATIENT)
Dept: HEMATOLOGY/ONCOLOGY | Facility: CLINIC | Age: 72
End: 2022-11-04
Payer: MEDICARE

## 2022-11-04 DIAGNOSIS — T50.905A DRUG-INDUCED CYTOPENIA: ICD-10-CM

## 2022-11-04 DIAGNOSIS — C90.00 MULTIPLE MYELOMA, REMISSION STATUS UNSPECIFIED: Primary | ICD-10-CM

## 2022-11-04 DIAGNOSIS — I10 ESSENTIAL HYPERTENSION: ICD-10-CM

## 2022-11-04 DIAGNOSIS — R06.02 SHORTNESS OF BREATH: ICD-10-CM

## 2022-11-04 DIAGNOSIS — Z94.84 HISTORY OF AUTO STEM CELL TRANSPLANT: ICD-10-CM

## 2022-11-04 DIAGNOSIS — C80.1 NEUROPATHY ASSOCIATED WITH CANCER: ICD-10-CM

## 2022-11-04 DIAGNOSIS — I69.30 HISTORY OF CVA WITH RESIDUAL DEFICIT: ICD-10-CM

## 2022-11-04 DIAGNOSIS — G31.84 MCI (MILD COGNITIVE IMPAIRMENT): ICD-10-CM

## 2022-11-04 DIAGNOSIS — F32.A ANXIETY AND DEPRESSION: ICD-10-CM

## 2022-11-04 DIAGNOSIS — D84.9 IMMUNOCOMPROMISED: ICD-10-CM

## 2022-11-04 DIAGNOSIS — D75.9 DRUG-INDUCED CYTOPENIA: ICD-10-CM

## 2022-11-04 DIAGNOSIS — F41.9 ANXIETY AND DEPRESSION: ICD-10-CM

## 2022-11-04 DIAGNOSIS — G63 NEUROPATHY ASSOCIATED WITH CANCER: ICD-10-CM

## 2022-11-04 DIAGNOSIS — E78.00 PURE HYPERCHOLESTEROLEMIA: ICD-10-CM

## 2022-11-04 PROCEDURE — 3044F HG A1C LEVEL LT 7.0%: CPT | Mod: CPTII,95,, | Performed by: NURSE PRACTITIONER

## 2022-11-04 PROCEDURE — 1159F MED LIST DOCD IN RCRD: CPT | Mod: CPTII,95,, | Performed by: NURSE PRACTITIONER

## 2022-11-04 PROCEDURE — 99214 OFFICE O/P EST MOD 30 MIN: CPT | Mod: 95,,, | Performed by: NURSE PRACTITIONER

## 2022-11-04 PROCEDURE — 1159F PR MEDICATION LIST DOCUMENTED IN MEDICAL RECORD: ICD-10-PCS | Mod: CPTII,95,, | Performed by: NURSE PRACTITIONER

## 2022-11-04 PROCEDURE — 1160F PR REVIEW ALL MEDS BY PRESCRIBER/CLIN PHARMACIST DOCUMENTED: ICD-10-PCS | Mod: CPTII,95,, | Performed by: NURSE PRACTITIONER

## 2022-11-04 PROCEDURE — 4010F PR ACE/ARB THEARPY RXD/TAKEN: ICD-10-PCS | Mod: CPTII,95,, | Performed by: NURSE PRACTITIONER

## 2022-11-04 PROCEDURE — 3044F PR MOST RECENT HEMOGLOBIN A1C LEVEL <7.0%: ICD-10-PCS | Mod: CPTII,95,, | Performed by: NURSE PRACTITIONER

## 2022-11-04 PROCEDURE — 1160F RVW MEDS BY RX/DR IN RCRD: CPT | Mod: CPTII,95,, | Performed by: NURSE PRACTITIONER

## 2022-11-04 PROCEDURE — 4010F ACE/ARB THERAPY RXD/TAKEN: CPT | Mod: CPTII,95,, | Performed by: NURSE PRACTITIONER

## 2022-11-04 PROCEDURE — 99214 PR OFFICE/OUTPT VISIT, EST, LEVL IV, 30-39 MIN: ICD-10-PCS | Mod: 95,,, | Performed by: NURSE PRACTITIONER

## 2022-11-04 NOTE — PROGRESS NOTES
PATIENT: Shelby Thompson  MRN: [id number]  DATE: 10/14/2021  The patient location is: Home  The chief complaint leading to consultation is: To initiate dose adjusted pom     Visit type: audiovisual    Face to Face time with patient: 20 minute  25 minutes of total time spent on the encounter, which includes face to face time and non-face to face time preparing to see the patient (eg, review of tests), Obtaining and/or reviewing separately obtained history, Documenting clinical information in the electronic or other health record, Independently interpreting results (not separately reported) and communicating results to the patient/family/caregiver, or Care coordination (not separately reported).         Each patient to whom he or she provides medical services by telemedicine is:  (1) informed of the relationship between the physician and patient and the respective role of any other health care provider with respect to management of the patient; and (2) notified that he or she may decline to receive medical services by telemedicine and may withdraw from such care at any time.    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  She achieved and tolerated well VRd x completing 7, 28 day cycles and achieving deep VGPR to induction. Then underwent Melphalan autologous stem cell transplant on 2/12/2020.      Extensive PMH as below.    2 prior CVAs (one in 2008, one in 2011)  L breast cancer DCIS stage 0 (s/p lumpectomy and radiation, no endocrine tx due to CVA)  HTN  DM II  Neuropathy, b/l hands  Prior smoker, quit in 2011  Hepatic lesions - vascular per recent MRI in 09 /2019 with no  changes; will confirm no further rascon needed from help  Statin Intolerance - on praluent, PCSK9 inhibitor  HFpEF - EF 55%, diastolic dysfunction  Anxiety/Depression     ADMISSION SUMMARY: 2/10-2/26/2020  Admitted 2/10, tolerated chemo and transplant well. Engrafted on day +12. Remained afebrile throughout hospital stay and no mucositis. Experienced expected side effects of nausea and diarrhea controlled with antiemetics and Imodium. Discharged home with home PT/OT       Subjective:       Initial History: Ms. Thompson is a 72 y.o. female who presents for virtual follow up.  She is 2 year 8  month post AutoSCT.  Relaped 1 year post sct and was on Sravanthi/zhanna/dex until summer 2022 when changed to sravanthi/pom/dex due to biohchemical only progression. Remains on sravanthi/pom/dex salvage. Worsening cytopenia related to pomalyst 4 mg. Pom was on hold since last week and dose adjusted script sent, not received yet. Occasional halucination and chronic PN present.        Past Medical History:   Past Medical History:   Diagnosis Date    Acute respiratory failure with hypoxia 4/30/2019    FUENTES (acute kidney injury) 5/1/2019    Allergy     Anemia     Aortic aneurysm     Breast cancer 10/2011    left breast Stage 0 DCIS    Cataract     Chronic diastolic congestive heart failure 11/6/2015    Colon polyp     Community acquired pneumonia of right lower lobe of lung 8/3/2021    GERD (gastroesophageal reflux disease)     Hiatal hernia     History of colonic polyps     Hx of psychiatric care     Zoloft    HX: breast cancer     Hyperlipemia     Hypertension     ICH (intracerebral hemorrhage)     Immunocompromised 10/28/2022    Major depressive disorder, single episode, mild 6/23/2016    Nuclear sclerosis 7/21/2014    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 7/21/2014    PAD (peripheral artery disease) 11/6/2015    Psychiatric problem     Statin-induced rhabdomyolysis     4/2015     Stroke 4/2011    Type 2 diabetes  mellitus with diabetic peripheral angiopathy without gangrene, with long-term current use of insulin 3/31/2021    Type 2 diabetes mellitus without complication, without long-term current use of insulin 2/10/2020    Walker as ambulation aid        Past Surgical HIstory:   Past Surgical History:   Procedure Laterality Date    APPENDECTOMY      BONE MARROW BIOPSY Left 10/3/2019    Procedure: Biopsy-bone marrow;  Surgeon: Jere Tineo MD;  [facility];  Service: Oncology;  Laterality: Left;    BREAST BIOPSY Left 10/2011    BREAST LUMPECTOMY Left 2011    DCIS    COLONOSCOPY      COLONOSCOPY N/A 1/9/2020    Procedure: COLONOSCOPY;  Surgeon: Quang De La Cruz MD;  Location: [facility] ENDO (4TH FLR);  Service: Endoscopy;  Laterality: N/A;    CYST REMOVAL      back    ESOPHAGOGASTRODUODENOSCOPY  07/2016    duodenal angioectasia    ESOPHAGOGASTRODUODENOSCOPY N/A 1/9/2020    Procedure: EGD (ESOPHAGOGASTRODUODENOSCOPY);  Surgeon: Quang De La Cruz MD;  [facility]  Service: Endoscopy;  Laterality: N/A;    FOOT FRACTURE SURGERY  10/2012    right foot, with R hallux valgus repair    FRACTURE SURGERY Right     broken toe repiar    HEMORRHOID SURGERY      LIVER BIOPSY  04/2015    essentially normal, no fibrosis    TUBAL LIGATION      UPPER GASTROINTESTINAL ENDOSCOPY         Family History:   Family History   Problem Relation Age of Onset    Dementia Sister         x1 sister    Cancer Sister 63        Lung Cancer    No Known Problems Mother     Cancer Father     No Known Problems Brother     Hypertension Daughter     Fibroids Daughter         uterine    Hypertension Son     No Known Problems Brother     No Known Problems Maternal Aunt     No Known Problems Maternal Uncle     No Known Problems Paternal Aunt     No Known Problems Paternal Uncle     Hypertension Maternal Grandmother     No Known Problems Maternal Grandfather     No Known Problems Paternal Grandmother     No Known Problems Paternal  Grandfather     Ovarian cancer Neg Hx     Colon cancer Neg Hx     Tremor Neg Hx     Amblyopia Neg Hx     Blindness Neg Hx     Cataracts Neg Hx     Diabetes Neg Hx     Glaucoma Neg Hx     Macular degeneration Neg Hx     Retinal detachment Neg Hx     Strabismus Neg Hx     Stroke Neg Hx     Thyroid disease Neg Hx     Esophageal cancer Neg Hx     Rectal cancer Neg Hx     Stomach cancer Neg Hx     Ulcerative colitis Neg Hx     Crohn's disease Neg Hx     Irritable bowel syndrome Neg Hx     Celiac disease Neg Hx        Social History:  reports that she quit smoking about 11 years ago. Her smoking use included cigarettes. She started smoking about 55 years ago. She has a 11.00 pack-year smoking history. She has never used smokeless tobacco. She reports that she does not currently use alcohol. She reports that she does not use drugs.    Allergies:  Review of patient's allergies indicates:   Allergen Reactions    Lipitor [atorvastatin] Itching     Itching, elevated cpk, muscle aches, statin induced myositis       Medications:  Current Outpatient Medications   Medication Sig Dispense Refill    acyclovir (ZOVIRAX) 400 MG tablet Take by mouth 2 (two) times daily.      albuterol (ACCUNEB) 1.25 mg/3 mL Nebu Take 3 mLs (1.25 mg total) by nebulization every 6 (six) hours as needed (wheeze/cough). Rescue 75 mL 2    alirocumab (PRALUENT PEN) 75 mg/mL PnIj Inject 1 mL (75 mg total) into the skin every 14 (fourteen) days. 6 mL 3    amLODIPine (NORVASC) 5 MG tablet TAKE 1 TABLET BY MOUTH EVERY DAY 90 tablet 0    aspirin (ECOTRIN) 81 MG EC tablet Take 1 tablet (81 mg total) by mouth once daily. 90 tablet 3    cholecalciferol, vitamin D3, 125 mcg (5,000 unit) Tab 1 tablet DAILY (route: oral)      DARZALEX FASPRO 1,800 mg-30,000 unit/15 mL Soln       ENGERIX-B, PF, 20 mcg/mL Syrg       ferrous gluconate 324 mg (37.5 mg iron) Tab tablet 1 tablet DAILY (route: oral)      gabapentin (NEURONTIN) 300 MG capsule Take 2 capsules (600 mg total) by  mouth 2 (two) times daily. 180 capsule 2    GADAVIST 1 mmol/mL (604.72 mg/mL) Soln injection       metFORMIN (GLUCOPHAGE) 1000 MG tablet Take 1 tablet (1,000 mg total) by mouth 2 (two) times daily with meals. 180 tablet 0    omeprazole (PRILOSEC) 40 MG capsule TAKE 1 CAPSULE BY MOUTH EVERY DAY 90 capsule 3    OXYGEN-AIR DELIVERY SYSTEMS MISC 1-2 Liter O2 - CONTINUOUS (route: Oxygen)      pomalidomide 3 mg Cap Take 3 mg by mouth Daily. 21 capsule 0    sertraline (ZOLOFT) 50 MG tablet TAKE 1 TABLET BY MOUTH EVERY DAY 90 tablet 3    valsartan (DIOVAN) 160 MG tablet Take 1 tablet (160 mg total) by mouth once daily. 90 tablet 3    zoledronic 4 mg/100 mL PgBk infusion       zoledronic acid (ZOMETA) 4 mg/5 mL injection        No current facility-administered medications for this visit.      See HPI     ECOG Performance Status: 2 (due to remote  CVA)   Objective:      Vitals:   There were no vitals filed for this visit.       PE: Deferred due to nature of visit    Laboratory Data:  Lab Visit on 11/03/2022   Component Date Value Ref Range Status    WBC 11/03/2022 2.70 (L)  3.90 - 12.70 K/uL Final    RBC 11/03/2022 3.69 (L)  4.00 - 5.40 M/uL Final    Hemoglobin 11/03/2022 8.2 (L)  12.0 - 16.0 g/dL Final    Hematocrit 11/03/2022 28.0 (L)  37.0 - 48.5 % Final    MCV 11/03/2022 76 (L)  82 - 98 fL Final    MCH 11/03/2022 22.2 (L)  27.0 - 31.0 pg Final    MCHC 11/03/2022 29.3 (L)  32.0 - 36.0 g/dL Final    RDW 11/03/2022 20.4 (H)  11.5 - 14.5 % Final    Platelets 11/03/2022 326  150 - 450 K/uL Final    MPV 11/03/2022 10.7  9.2 - 12.9 fL Final    Immature Granulocytes 11/03/2022 0.4  0.0 - 0.5 % Final    Gran # (ANC) 11/03/2022 1.0 (L)  1.8 - 7.7 K/uL Final    Immature Grans (Abs) 11/03/2022 0.01  0.00 - 0.04 K/uL Final    Comment: Mild elevation in immature granulocytes is non specific and   can be seen in a variety of conditions including stress response,   acute inflammation, trauma and pregnancy. Correlation with other    laboratory and clinical findings is essential.      Lymph # 11/03/2022 1.1  1.0 - 4.8 K/uL Final    Mono # 11/03/2022 0.5  0.3 - 1.0 K/uL Final    Eos # 11/03/2022 0.1  0.0 - 0.5 K/uL Final    Baso # 11/03/2022 0.06  0.00 - 0.20 K/uL Final    nRBC 11/03/2022 0  0 /100 WBC Final    Gran % 11/03/2022 36.3 (L)  38.0 - 73.0 % Final    Lymph % 11/03/2022 39.6  18.0 - 48.0 % Final    Mono % 11/03/2022 18.9 (H)  4.0 - 15.0 % Final    Eosinophil % 11/03/2022 2.6  0.0 - 8.0 % Final    Basophil % 11/03/2022 2.2 (H)  0.0 - 1.9 % Final    Differential Method 11/03/2022 Automated   Final    Sodium 11/03/2022 139  136 - 145 mmol/L Final    Potassium 11/03/2022 4.0  3.5 - 5.1 mmol/L Final    Chloride 11/03/2022 108  95 - 110 mmol/L Final    CO2 11/03/2022 23  23 - 29 mmol/L Final    Glucose 11/03/2022 86  70 - 110 mg/dL Final    BUN 11/03/2022 15  8 - 23 mg/dL Final    Creatinine 11/03/2022 0.9  0.5 - 1.4 mg/dL Final    Calcium 11/03/2022 9.2  8.7 - 10.5 mg/dL Final    Total Protein 11/03/2022 7.2  6.0 - 8.4 g/dL Final    Albumin 11/03/2022 3.7  3.5 - 5.2 g/dL Final    Total Bilirubin 11/03/2022 0.5  0.1 - 1.0 mg/dL Final    Comment: For infants and newborns, interpretation of results should be based  on gestational age, weight and in agreement with clinical  observations.    Premature Infant recommended reference ranges:  Up to 24 hours.............<8.0 mg/dL  Up to 48 hours............<12.0 mg/dL  3-5 days..................<15.0 mg/dL  6-29 days.................<15.0 mg/dL      Alkaline Phosphatase 11/03/2022 49 (L)  55 - 135 U/L Final    AST 11/03/2022 14  10 - 40 U/L Final    ALT 11/03/2022 9 (L)  10 - 44 U/L Final    Anion Gap 11/03/2022 8  8 - 16 mmol/L Final    eGFR 11/03/2022 >60.0  >60 mL/min/1.73 m^2 Final      Lab Results   Component Value Date    WBC 2.70 (L) 11/03/2022    HGB 8.2 (L) 11/03/2022    HCT 28.0 (L) 11/03/2022    MCV 76 (L) 11/03/2022     11/03/2022       Gran # (ANC)   Date Value Ref Range Status    11/03/2022 1.0 (L) 1.8 - 7.7 K/uL Final     Gran %   Date Value Ref Range Status   11/03/2022 36.3 (L) 38.0 - 73.0 % Final     Kappa Free Light Chains   Date Value Ref Range Status   10/27/2022 9.93 (H) 0.33 - 1.94 mg/dL Final   09/29/2022 10.56 (H) 0.33 - 1.94 mg/dL Final   09/01/2022 11.94 (H) 0.33 - 1.94 mg/dL Final     Lambda Free Light Chains   Date Value Ref Range Status   10/27/2022 2.89 (H) 0.57 - 2.63 mg/dL Final   09/29/2022 2.69 (H) 0.57 - 2.63 mg/dL Final   09/01/2022 2.63 0.57 - 2.63 mg/dL Final     Kappa/Lambda FLC Ratio   Date Value Ref Range Status   10/27/2022 3.44 (H) 0.26 - 1.65 Final     Comment:     Undetected antigen excess is a rare event but cannot   be excluded. If these free light chain results do not   agree with other clinical or laboratory findings or   if the sample is from a patient that has previously   demonstrated antigen excess, discuss with the testing   laboratory.   Results should always be interpreted in conjunction   with other laboratory tests and clinical evidence.     09/29/2022 3.93 (H) 0.26 - 1.65 Final     Comment:     Undetected antigen excess is a rare event but cannot   be excluded. If these free light chain results do not   agree with other clinical or laboratory findings or   if the sample is from a patient that has previously   demonstrated antigen excess, discuss with the testing   laboratory.   Results should always be interpreted in conjunction   with other laboratory tests and clinical evidence.     09/01/2022 4.54 (H) 0.26 - 1.65 Final     Comment:     Undetected antigen excess is a rare event but cannot   be excluded. If these free light chain results do not   agree with other clinical or laboratory findings or   if the sample is from a patient that has previously   demonstrated antigen excess, discuss with the testing   laboratory.   Results should always be interpreted in conjunction   with other laboratory tests and clinical evidence.       IgG   Date  Value Ref Range Status   10/27/2022 1117 650 - 1600 mg/dL Final     Comment:     IgG Cord Blood Reference Range: 650-1600 mg/dL.     IgA   Date Value Ref Range Status   10/27/2022 168 40 - 350 mg/dL Final     Comment:     IgA Cord Blood Reference Range: <5 mg/dL.     IgM   Date Value Ref Range Status   10/27/2022 23 (L) 50 - 300 mg/dL Final     Comment:     IgM Cord Blood Reference Range: <25 mg/dL.        Imaging:      Assessment:       Ms. Thompson is a  72 y.o. femal with relapsed multiple myeloma.     Plan:     MM s/p Auto SCT  - high risk MM with 17p deletion  - 2 year 8 months  s/p Auto SCT  - started maintenance q2w velcade 5/6/20   - serial biochemical relapse  Noted and  given this and high risk CG , transitioned to Sravanthi/Noble (1mg/m2) /dex  Salvage July 2021    - Due to uncontrolled hyperglycemia dex stopped after C4.    -supportive meds:   continue ppx acyclovir, asa; resume maintenance zometa q 3 months for 1 year; last dose on 08/04/22, next dose today.  -she had demonstrated mild biochemical progression over summer 2022 studies with no CRAB, in light of this and high risk CG   we transitioned her therapy from Sravanthi/Velcade to Sravanthi/pomalyst; intolerant to dex.   - biochemical studies from 9/29/22 improved so plan to continue until intolerant or progression. Worsened cytopenia from pomalyst 4 mg. Pom was on hold pomalyst since last week. Dose reduced pomalyst sent to osp,not received yet. Cytopenia improved, patient to start pom 3 mg as soon as she receive it. Messaged osp regarding medication status(11/04)  -already on asa 81mg   -post transplant vaccines completed     Cytopenia due to chemotherapy  -due to therapy, continue to monitor    - Continue to monitor  - Counts improved. Pom dose adjusted as above    Neuropathy  -Stable   - continues gabapentin and prn tramadol     SOB/descending aortic aneurysm  - CTA and dopplers ordered urgently with high concern for DVT/PE; completed 8/3/20  - incidental  "descending thoracic aortic aneurysm noted; referred to thoracic surgery; seen 8/7/20; per note: "at current size would not recommend any intervention as risk of rupture remains low.  Recommend surveillance CT chest every 12 months to monitor growth of the aneurysm.  Would typically recommend aspirin 81 mg daily in patients with aortic aneurysm  - ASA started (9/14/20)     HTN  - continue norvasc  - Patient to monitor BP closely    Anxiety/depression/hallucinations/tremoc  - continue zoloft  -recommended she make appt with established neurologist to discuss mild occasional self resolving hallucinations. No f/u scheduled yet, will contact department.  -continue fu with neurology for above    DM2  - diet controlled    Hx of CVA  - s/p 2008, 2011    HLD with Statin Intolerance  -on praluent, PCSK9 inhibitor    L breast cancer DCIS stage 0  -s/p lumpectomy and radiation, no endocrine tx due to CVA       BMT Chart Routing      Follow up with physician . Keep scheduled lab,visit, chemo on 11/23   Follow up with CHRISTY    Provider visit type    Infusion scheduling note    Injection scheduling note    Labs    Imaging    Pharmacy appointment    Other referrals        Adina Wright NP  Hematology/Oncology/BMT                "

## 2022-11-07 ENCOUNTER — SPECIALTY PHARMACY (OUTPATIENT)
Dept: PHARMACY | Facility: CLINIC | Age: 72
End: 2022-11-07
Payer: MEDICARE

## 2022-11-07 ENCOUNTER — TELEPHONE (OUTPATIENT)
Dept: HEMATOLOGY/ONCOLOGY | Facility: CLINIC | Age: 72
End: 2022-11-07
Payer: MEDICARE

## 2022-11-07 NOTE — TELEPHONE ENCOUNTER
Specialty Pharmacy - Refill Coordination    Specialty Medication Orders Linked to Encounter      Flowsheet Row Most Recent Value   Medication #1 alirocumab (PRALUENT PEN) 75 mg/mL PnIj (Order#[id number], Rx#[id number])            Refill Questions - Documented Responses      Flowsheet Row Most Recent Value   Patient Availability and HIPAA Verification    Does patient want to proceed with activity? Yes   HIPAA/medical authority confirmed? Yes   Relationship to patient of person spoken to? Spouse/Significant Other   Refill Screening Questions    Would patient like to speak to a pharmacist? No   When does the patient need to receive the medication? 11/16/22   Refill Delivery Questions    How will the patient receive the medication? MEDRx   When does the patient need to receive the medication? 11/16/22   Shipping Address Home   Address in [facility] confirmed and updated if neccessary? Yes   Expected Copay ($) 0   Is the patient able to afford the medication copay? Yes   Payment Method zero copay   Days supply of Refill 84   Supplies needed? No supplies needed   Refill activity completed? Yes   Refill activity plan Refill scheduled   Shipment/Pickup Date: 11/14/22            Current Outpatient Medications   Medication Sig    acyclovir (ZOVIRAX) 400 MG tablet Take by mouth 2 (two) times daily.    albuterol (ACCUNEB) 1.25 mg/3 mL Nebu Take 3 mLs (1.25 mg total) by nebulization every 6 (six) hours as needed (wheeze/cough). Rescue    alirocumab (PRALUENT PEN) 75 mg/mL PnIj Inject 1 mL (75 mg total) into the skin every 14 (fourteen) days.    amLODIPine (NORVASC) 5 MG tablet TAKE 1 TABLET BY MOUTH EVERY DAY    aspirin (ECOTRIN) 81 MG EC tablet Take 1 tablet (81 mg total) by mouth once daily.    cholecalciferol, vitamin D3, 125 mcg (5,000 unit) Tab 1 tablet DAILY (route: oral)    DARZALEX FASPRO 1,800 mg-30,000 unit/15 mL Soln     ENGERIX-B, PF, 20 mcg/mL Syrg     ferrous gluconate 324 mg (37.5 mg iron) Tab tablet 1  tablet DAILY (route: oral)    gabapentin (NEURONTIN) 300 MG capsule Take 2 capsules (600 mg total) by mouth 2 (two) times daily.    GADAVIST 1 mmol/mL (604.72 mg/mL) Soln injection     metFORMIN (GLUCOPHAGE) 1000 MG tablet Take 1 tablet (1,000 mg total) by mouth 2 (two) times daily with meals.    omeprazole (PRILOSEC) 40 MG capsule TAKE 1 CAPSULE BY MOUTH EVERY DAY    OXYGEN-AIR DELIVERY SYSTEMS MISC 1-2 Liter O2 - CONTINUOUS (route: Oxygen)    pomalidomide 3 mg Cap Take 3 mg by mouth Daily.    sertraline (ZOLOFT) 50 MG tablet TAKE 1 TABLET BY MOUTH EVERY DAY    valsartan (DIOVAN) 160 MG tablet Take 1 tablet (160 mg total) by mouth once daily.    zoledronic 4 mg/100 mL PgBk infusion     zoledronic acid (ZOMETA) 4 mg/5 mL injection    Last reviewed on 11/4/2022  8:20 PM by Adina Wright NP    Review of patient's allergies indicates:   Allergen Reactions    Lipitor [atorvastatin] Itching     Itching, elevated cpk, muscle aches, statin induced myositis    Last reviewed on  11/4/2022 8:20 PM by Adina Wright      Tasks added this encounter   1/29/2023 - Refill Call (Auto Added)   Tasks due within next 3 months   No tasks due.     Shelli Negron, PharmD  [facility]  [address] [facility] [address]  Phone: [phone]  Fax: [phone]

## 2022-11-07 NOTE — TELEPHONE ENCOUNTER
"----- Message from Angie Barfield sent at 11/7/2022 12:39 PM CST -----  Regarding: speak with office  Contact: yazmin  Consult/Advisory:           Name Of Caller:yazmin    Contact Preference?:[phone]           Provider Name: Does patient feel the need to be seen today? No            What is the nature of the call?:[facility] calling to speak with office about medication for pt..Yazmin [phone]             Additional Notes:  "Thank you for all that you do for our patients'"     "

## 2022-11-07 NOTE — TELEPHONE ENCOUNTER
Verified Pomalyst dose reduced to 3 mg. Contacted [employer] to obtain new [employer] auth [id number]. Contacted [facility] to provide new [employer] auth.

## 2022-11-10 DIAGNOSIS — Z94.84 HISTORY OF AUTO STEM CELL TRANSPLANT: ICD-10-CM

## 2022-11-10 DIAGNOSIS — C90.00 MULTIPLE MYELOMA, REMISSION STATUS UNSPECIFIED: ICD-10-CM

## 2022-11-10 RX ORDER — POMALIDOMIDE 4 MG/1
CAPSULE ORAL
Qty: 21 CAPSULE | Refills: 0 | OUTPATIENT
Start: 2022-11-10

## 2022-11-18 RX ORDER — AMLODIPINE BESYLATE 5 MG/1
TABLET ORAL
Qty: 90 TABLET | Refills: 2 | Status: SHIPPED | OUTPATIENT
Start: 2022-11-18 | End: 2023-08-16

## 2022-11-18 NOTE — TELEPHONE ENCOUNTER
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

## 2022-11-18 NOTE — TELEPHONE ENCOUNTER
Care Due:                  Date            Visit Type   Department     Provider  --------------------------------------------------------------------------------                                EP -                              PRIMARY      [facility] INTERNAL  Last Visit: 07-      CARE ([facility])   ASHLI Arevalo                               -                              PRIMARY      [facility] INTERNAL  Next Visit: 01-      CARE ([facility])   ASHLI Arevalo                                                            Last  Test          Frequency    Reason                     Performed    Due Date  --------------------------------------------------------------------------------    HBA1C.......  6 months...  metFORMIN................  03- 09-    [facility] Embedded Care Gaps. Reference number: [id number]. 11/18/2022   1:33:45 AM CST

## 2022-11-20 NOTE — TELEPHONE ENCOUNTER
Spoke with the pt and advised her to call the [facility] Pt Assistance Program - number provided.   CIWA protocol initiation less than 96 hours/Dizziness/Poor balance/Weakness

## 2022-11-23 ENCOUNTER — INFUSION (OUTPATIENT)
Dept: INFUSION THERAPY | Facility: HOSPITAL | Age: 72
End: 2022-11-23
Payer: MEDICARE

## 2022-11-23 ENCOUNTER — OFFICE VISIT (OUTPATIENT)
Dept: HEMATOLOGY/ONCOLOGY | Facility: CLINIC | Age: 72
End: 2022-11-23
Payer: MEDICARE

## 2022-11-23 VITALS
WEIGHT: 152.44 LBS | TEMPERATURE: 98 F | OXYGEN SATURATION: 92 % | HEIGHT: 62 IN | DIASTOLIC BLOOD PRESSURE: 78 MMHG | HEART RATE: 71 BPM | RESPIRATION RATE: 16 BRPM | SYSTOLIC BLOOD PRESSURE: 136 MMHG | BODY MASS INDEX: 28.05 KG/M2

## 2022-11-23 VITALS — SYSTOLIC BLOOD PRESSURE: 132 MMHG | DIASTOLIC BLOOD PRESSURE: 62 MMHG | HEART RATE: 50 BPM

## 2022-11-23 DIAGNOSIS — F41.9 ANXIETY AND DEPRESSION: ICD-10-CM

## 2022-11-23 DIAGNOSIS — D84.9 IMMUNOCOMPROMISED: ICD-10-CM

## 2022-11-23 DIAGNOSIS — C90.01 MULTIPLE MYELOMA IN REMISSION: Primary | ICD-10-CM

## 2022-11-23 DIAGNOSIS — G63 NEUROPATHY ASSOCIATED WITH CANCER: ICD-10-CM

## 2022-11-23 DIAGNOSIS — C80.1 NEUROPATHY ASSOCIATED WITH CANCER: ICD-10-CM

## 2022-11-23 DIAGNOSIS — I10 ESSENTIAL HYPERTENSION: ICD-10-CM

## 2022-11-23 DIAGNOSIS — E78.00 PURE HYPERCHOLESTEROLEMIA: ICD-10-CM

## 2022-11-23 DIAGNOSIS — C90.02 MULTIPLE MYELOMA IN RELAPSE: ICD-10-CM

## 2022-11-23 DIAGNOSIS — I69.30 HISTORY OF CVA WITH RESIDUAL DEFICIT: ICD-10-CM

## 2022-11-23 DIAGNOSIS — Z94.84 HISTORY OF AUTO STEM CELL TRANSPLANT: ICD-10-CM

## 2022-11-23 DIAGNOSIS — R06.02 SHORTNESS OF BREATH: ICD-10-CM

## 2022-11-23 DIAGNOSIS — T50.905A DRUG-INDUCED CYTOPENIA: ICD-10-CM

## 2022-11-23 DIAGNOSIS — D75.9 DRUG-INDUCED CYTOPENIA: ICD-10-CM

## 2022-11-23 DIAGNOSIS — C90.00 MULTIPLE MYELOMA, REMISSION STATUS UNSPECIFIED: Primary | ICD-10-CM

## 2022-11-23 DIAGNOSIS — F32.A ANXIETY AND DEPRESSION: ICD-10-CM

## 2022-11-23 PROCEDURE — 99999 PR PBB SHADOW E&M-EST. PATIENT-LVL IV: CPT | Mod: PBBFAC,,, | Performed by: NURSE PRACTITIONER

## 2022-11-23 PROCEDURE — 4010F ACE/ARB THERAPY RXD/TAKEN: CPT | Mod: CPTII,S$GLB,, | Performed by: NURSE PRACTITIONER

## 2022-11-23 PROCEDURE — 3008F PR BODY MASS INDEX (BMI) DOCUMENTED: ICD-10-PCS | Mod: CPTII,S$GLB,, | Performed by: NURSE PRACTITIONER

## 2022-11-23 PROCEDURE — 99215 OFFICE O/P EST HI 40 MIN: CPT | Mod: S$GLB,,, | Performed by: NURSE PRACTITIONER

## 2022-11-23 PROCEDURE — 99215 PR OFFICE/OUTPT VISIT, EST, LEVL V, 40-54 MIN: ICD-10-PCS | Mod: S$GLB,,, | Performed by: NURSE PRACTITIONER

## 2022-11-23 PROCEDURE — 3044F HG A1C LEVEL LT 7.0%: CPT | Mod: CPTII,S$GLB,, | Performed by: NURSE PRACTITIONER

## 2022-11-23 PROCEDURE — 3078F PR MOST RECENT DIASTOLIC BLOOD PRESSURE < 80 MM HG: ICD-10-PCS | Mod: CPTII,S$GLB,, | Performed by: NURSE PRACTITIONER

## 2022-11-23 PROCEDURE — 4010F PR ACE/ARB THEARPY RXD/TAKEN: ICD-10-PCS | Mod: CPTII,S$GLB,, | Performed by: NURSE PRACTITIONER

## 2022-11-23 PROCEDURE — 1159F PR MEDICATION LIST DOCUMENTED IN MEDICAL RECORD: ICD-10-PCS | Mod: CPTII,S$GLB,, | Performed by: NURSE PRACTITIONER

## 2022-11-23 PROCEDURE — 63600175 PHARM REV CODE 636 W HCPCS: Mod: TB | Performed by: NURSE PRACTITIONER

## 2022-11-23 PROCEDURE — 3008F BODY MASS INDEX DOCD: CPT | Mod: CPTII,S$GLB,, | Performed by: NURSE PRACTITIONER

## 2022-11-23 PROCEDURE — 1160F RVW MEDS BY RX/DR IN RCRD: CPT | Mod: CPTII,S$GLB,, | Performed by: NURSE PRACTITIONER

## 2022-11-23 PROCEDURE — 3044F PR MOST RECENT HEMOGLOBIN A1C LEVEL <7.0%: ICD-10-PCS | Mod: CPTII,S$GLB,, | Performed by: NURSE PRACTITIONER

## 2022-11-23 PROCEDURE — 99999 PR PBB SHADOW E&M-EST. PATIENT-LVL IV: ICD-10-PCS | Mod: PBBFAC,,, | Performed by: NURSE PRACTITIONER

## 2022-11-23 PROCEDURE — 3075F PR MOST RECENT SYSTOLIC BLOOD PRESS GE 130-139MM HG: ICD-10-PCS | Mod: CPTII,S$GLB,, | Performed by: NURSE PRACTITIONER

## 2022-11-23 PROCEDURE — 3078F DIAST BP <80 MM HG: CPT | Mod: CPTII,S$GLB,, | Performed by: NURSE PRACTITIONER

## 2022-11-23 PROCEDURE — 1126F AMNT PAIN NOTED NONE PRSNT: CPT | Mod: CPTII,S$GLB,, | Performed by: NURSE PRACTITIONER

## 2022-11-23 PROCEDURE — 96401 CHEMO ANTI-NEOPL SQ/IM: CPT

## 2022-11-23 PROCEDURE — 3075F SYST BP GE 130 - 139MM HG: CPT | Mod: CPTII,S$GLB,, | Performed by: NURSE PRACTITIONER

## 2022-11-23 PROCEDURE — 1159F MED LIST DOCD IN RCRD: CPT | Mod: CPTII,S$GLB,, | Performed by: NURSE PRACTITIONER

## 2022-11-23 PROCEDURE — 1126F PR PAIN SEVERITY QUANTIFIED, NO PAIN PRESENT: ICD-10-PCS | Mod: CPTII,S$GLB,, | Performed by: NURSE PRACTITIONER

## 2022-11-23 PROCEDURE — 1160F PR REVIEW ALL MEDS BY PRESCRIBER/CLIN PHARMACIST DOCUMENTED: ICD-10-PCS | Mod: CPTII,S$GLB,, | Performed by: NURSE PRACTITIONER

## 2022-11-23 RX ORDER — DIPHENHYDRAMINE HYDROCHLORIDE 50 MG/ML
50 INJECTION INTRAMUSCULAR; INTRAVENOUS ONCE AS NEEDED
Status: CANCELLED | OUTPATIENT
Start: 2022-11-24

## 2022-11-23 RX ORDER — ACYCLOVIR 400 MG/1
400 TABLET ORAL 2 TIMES DAILY
Qty: 60 TABLET | Refills: 2 | Status: SHIPPED | OUTPATIENT
Start: 2022-11-23 | End: 2023-02-14 | Stop reason: SDUPTHER

## 2022-11-23 RX ORDER — EPINEPHRINE 0.3 MG/.3ML
0.3 INJECTION SUBCUTANEOUS ONCE AS NEEDED
Status: CANCELLED | OUTPATIENT
Start: 2022-11-24

## 2022-11-23 RX ORDER — EPINEPHRINE 0.3 MG/.3ML
0.3 INJECTION SUBCUTANEOUS ONCE AS NEEDED
Status: DISCONTINUED | OUTPATIENT
Start: 2022-11-23 | End: 2022-11-23 | Stop reason: HOSPADM

## 2022-11-23 RX ORDER — DIPHENHYDRAMINE HYDROCHLORIDE 50 MG/ML
50 INJECTION INTRAMUSCULAR; INTRAVENOUS ONCE AS NEEDED
Status: DISCONTINUED | OUTPATIENT
Start: 2022-11-23 | End: 2022-11-23 | Stop reason: HOSPADM

## 2022-11-23 RX ORDER — POTASSIUM CHLORIDE 20 MEQ/1
TABLET, EXTENDED RELEASE ORAL
COMMUNITY
Start: 2022-11-19

## 2022-11-23 RX ADMIN — DARATUMUMAB AND HYALURONIDASE-FIHJ (HUMAN RECOMBINANT) 1800 MG: 1800; 30000 INJECTION SUBCUTANEOUS at 12:11

## 2022-11-23 NOTE — PROGRESS NOTES
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  She achieved and tolerated well VRd x completing 7, 28 day cycles and achieving deep VGPR to induction. Then underwent Melphalan autologous stem cell transplant on 2/12/2020.      Extensive PMH as below.    2 prior CVAs (one in 2008, one in 2011)  L breast cancer DCIS stage 0 (s/p lumpectomy and radiation, no endocrine tx due to CVA)  HTN  DM II  Neuropathy, b/l hands  Prior smoker, quit in 2011  Hepatic lesions - vascular per recent MRI in 09 /2019 with no changes; will confirm no further rascon needed from help  Statin Intolerance - on praluent, PCSK9 inhibitor  HFpEF - EF 55%, diastolic dysfunction  Anxiety/Depression     ADMISSION SUMMARY: 2/10-2/26/2020  Admitted 2/10, tolerated chemo and transplant well. Engrafted on day +12. Remained afebrile throughout hospital stay and no mucositis. Experienced expected side effects of nausea and diarrhea controlled with antiemetics and Imodium. Discharged home with home PT/OT       Subjective:       Initial History: Ms. Thompson is a 72 y.o. female who presents for virtual follow up.  She is 2 year 9  month post AutoSCT.  Relaped 1 year post sct and was on Sravanthi/zhanna/dex until summer 2022 when changed to sravanthi/pom/dex due to biohchemical only progression. Remains on sravanthi/pom/dex salvage. Worsening cytopenia related to pomalyst 4 mg. Dose adjusted pom initiated and tolerating well. Occasional hallucination and stable PN present. No other active complaints. Presents with her .    Past Medical History:   Past Medical History:   Diagnosis Date    Acute respiratory  failure with hypoxia 4/30/2019    FUENTES (acute kidney injury) 5/1/2019    Allergy     Anemia     Aortic aneurysm     Breast cancer 10/2011    left breast Stage 0 DCIS    Cataract     Chronic diastolic congestive heart failure 11/6/2015    Colon polyp     Community acquired pneumonia of right lower lobe of lung 8/3/2021    GERD (gastroesophageal reflux disease)     Hiatal hernia     History of colonic polyps     Hx of psychiatric care     Zoloft    HX: breast cancer     Hyperlipemia     Hypertension     ICH (intracerebral hemorrhage)     Immunocompromised 10/28/2022    Major depressive disorder, single episode, mild 6/23/2016    Nuclear sclerosis 7/21/2014    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 7/21/2014    PAD (peripheral artery disease) 11/6/2015    Psychiatric problem     Statin-induced rhabdomyolysis     4/2015     Stroke 4/2011    Type 2 diabetes mellitus with diabetic peripheral angiopathy without gangrene, with long-term current use of insulin 3/31/2021    Type 2 diabetes mellitus without complication, without long-term current use of insulin 2/10/2020    Walker as ambulation aid        Past Surgical HIstory:   Past Surgical History:   Procedure Laterality Date    APPENDECTOMY      BONE MARROW BIOPSY Left 10/3/2019    Procedure: Biopsy-bone marrow;  Surgeon: Jere Tineo MD;  Location: [facility] OR [address] [place] [address];  Service: Oncology;  Laterality: Left;    BREAST BIOPSY Left 10/2011    BREAST LUMPECTOMY Left 2011    DCIS    COLONOSCOPY      COLONOSCOPY N/A 1/9/2020    Procedure: COLONOSCOPY;  Surgeon: Quang De La Cruz MD;  Location: [facility] (4TH FLR);  Service: Endoscopy;  Laterality: N/A;    CYST REMOVAL      back    ESOPHAGOGASTRODUODENOSCOPY  07/2016    duodenal angioectasia    ESOPHAGOGASTRODUODENOSCOPY N/A 1/9/2020    Procedure: EGD (ESOPHAGOGASTRODUODENOSCOPY);  Surgeon: Quang De La Cruz MD;  Location: [facility] ENDO (4TH FLR);  Service: Endoscopy;  Laterality: N/A;    FOOT  FRACTURE SURGERY  10/2012    right foot, with R hallux valgus repair    FRACTURE SURGERY Right     broken toe repiar    HEMORRHOID SURGERY      LIVER BIOPSY  04/2015    essentially normal, no fibrosis    TUBAL LIGATION      UPPER GASTROINTESTINAL ENDOSCOPY         Family History:   Family History   Problem Relation Age of Onset    Dementia Sister         x1 sister    Cancer Sister 63        Lung Cancer    No Known Problems Mother     Cancer Father     No Known Problems Brother     Hypertension Daughter     Fibroids Daughter         uterine    Hypertension Son     No Known Problems Brother     No Known Problems Maternal Aunt     No Known Problems Maternal Uncle     No Known Problems Paternal Aunt     No Known Problems Paternal Uncle     Hypertension Maternal Grandmother     No Known Problems Maternal Grandfather     No Known Problems Paternal Grandmother     No Known Problems Paternal Grandfather     Ovarian cancer Neg Hx     Colon cancer Neg Hx     Tremor Neg Hx     Amblyopia Neg Hx     Blindness Neg Hx     Cataracts Neg Hx     Diabetes Neg Hx     Glaucoma Neg Hx     Macular degeneration Neg Hx     Retinal detachment Neg Hx     Strabismus Neg Hx     Stroke Neg Hx     Thyroid disease Neg Hx     Esophageal cancer Neg Hx     Rectal cancer Neg Hx     Stomach cancer Neg Hx     Ulcerative colitis Neg Hx     Crohn's disease Neg Hx     Irritable bowel syndrome Neg Hx     Celiac disease Neg Hx        Social History:  reports that she quit smoking about 11 years ago. Her smoking use included cigarettes. She started smoking about 55 years ago. She has a 11.00 pack-year smoking history. She has never used smokeless tobacco. She reports that she does not currently use alcohol. She reports that she does not use drugs.    Allergies:  Review of patient's allergies indicates:   Allergen Reactions    Lipitor [atorvastatin] Itching     Itching, elevated cpk, muscle aches, statin induced myositis       Medications:  Current Outpatient  Medications   Medication Sig Dispense Refill    albuterol (ACCUNEB) 1.25 mg/3 mL Nebu Take 3 mLs (1.25 mg total) by nebulization every 6 (six) hours as needed (wheeze/cough). Rescue 75 mL 2    alirocumab (PRALUENT PEN) 75 mg/mL PnIj Inject 1 mL (75 mg total) into the skin every 14 (fourteen) days. 6 mL 3    amLODIPine (NORVASC) 5 MG tablet TAKE 1 TABLET BY MOUTH EVERY DAY 90 tablet 2    cholecalciferol, vitamin D3, 125 mcg (5,000 unit) Tab 1 tablet DAILY (route: oral)      DARZALEX FASPRO 1,800 mg-30,000 unit/15 mL Soln       ENGERIX-B, PF, 20 mcg/mL Syrg       ferrous gluconate 324 mg (37.5 mg iron) Tab tablet 1 tablet DAILY (route: oral)      gabapentin (NEURONTIN) 300 MG capsule Take 2 capsules (600 mg total) by mouth 2 (two) times daily. 180 capsule 2    GADAVIST 1 mmol/mL (604.72 mg/mL) Soln injection       metFORMIN (GLUCOPHAGE) 1000 MG tablet Take 1 tablet (1,000 mg total) by mouth 2 (two) times daily with meals. 180 tablet 0    omeprazole (PRILOSEC) 40 MG capsule TAKE 1 CAPSULE BY MOUTH EVERY DAY 90 capsule 3    OXYGEN-AIR DELIVERY SYSTEMS MISC 1-2 Liter O2 - CONTINUOUS (route: Oxygen)      pomalidomide 3 mg Cap Take 3 mg by mouth Daily. 21 capsule 0    potassium chloride SA (K-DUR,KLOR-CON) 20 MEQ tablet       sertraline (ZOLOFT) 50 MG tablet TAKE 1 TABLET BY MOUTH EVERY DAY 90 tablet 3    valsartan (DIOVAN) 160 MG tablet Take 1 tablet (160 mg total) by mouth once daily. 90 tablet 3    zoledronic 4 mg/100 mL PgBk infusion       zoledronic acid (ZOMETA) 4 mg/5 mL injection       acyclovir (ZOVIRAX) 400 MG tablet Take 1 tablet (400 mg total) by mouth 2 (two) times daily. 60 tablet 2    aspirin (ECOTRIN) 81 MG EC tablet Take 1 tablet (81 mg total) by mouth once daily. 90 tablet 3     No current facility-administered medications for this visit.      See HPI     ECOG Performance Status: 2 (due to remote  CVA)   Objective:      Vitals:   Vitals:    11/23/22 1027   BP: 136/78   Pulse: 71   Resp: 16   Temp: 98.3 °F  (36.8 °C)          PE: Deferred due to nature of visit    Laboratory Data:  Lab Visit on 11/23/2022   Component Date Value Ref Range Status    WBC 11/23/2022 3.85 (L)  3.90 - 12.70 K/uL Final    RBC 11/23/2022 3.70 (L)  4.00 - 5.40 M/uL Final    Hemoglobin 11/23/2022 8.0 (L)  12.0 - 16.0 g/dL Final    Hematocrit 11/23/2022 28.2 (L)  37.0 - 48.5 % Final    MCV 11/23/2022 76 (L)  82 - 98 fL Final    MCH 11/23/2022 21.6 (L)  27.0 - 31.0 pg Final    MCHC 11/23/2022 28.4 (L)  32.0 - 36.0 g/dL Final    RDW 11/23/2022 21.2 (H)  11.5 - 14.5 % Final    Platelets 11/23/2022 211  150 - 450 K/uL Final    MPV 11/23/2022 SEE COMMENT  9.2 - 12.9 fL Final    Result not available.    Immature Granulocytes 11/23/2022 0.3  0.0 - 0.5 % Final    Gran # (ANC) 11/23/2022 1.7 (L)  1.8 - 7.7 K/uL Final    Immature Grans (Abs) 11/23/2022 0.01  0.00 - 0.04 K/uL Final    Comment: Mild elevation in immature granulocytes is non specific and   can be seen in a variety of conditions including stress response,   acute inflammation, trauma and pregnancy. Correlation with other   laboratory and clinical findings is essential.      Lymph # 11/23/2022 1.1  1.0 - 4.8 K/uL Final    Mono # 11/23/2022 0.8  0.3 - 1.0 K/uL Final    Eos # 11/23/2022 0.3  0.0 - 0.5 K/uL Final    Baso # 11/23/2022 0.04  0.00 - 0.20 K/uL Final    nRBC 11/23/2022 0  0 /100 WBC Final    Gran % 11/23/2022 43.1  38.0 - 73.0 % Final    Lymph % 11/23/2022 28.3  18.0 - 48.0 % Final    Mono % 11/23/2022 19.5 (H)  4.0 - 15.0 % Final    Eosinophil % 11/23/2022 7.8  0.0 - 8.0 % Final    Basophil % 11/23/2022 1.0  0.0 - 1.9 % Final    Differential Method 11/23/2022 Automated   Final    Sodium 11/23/2022 141  136 - 145 mmol/L Final    Potassium 11/23/2022 3.8  3.5 - 5.1 mmol/L Final    Chloride 11/23/2022 107  95 - 110 mmol/L Final    CO2 11/23/2022 26  23 - 29 mmol/L Final    Glucose 11/23/2022 85  70 - 110 mg/dL Final    BUN 11/23/2022 11  8 - 23 mg/dL Final    Creatinine 11/23/2022 0.9   0.5 - 1.4 mg/dL Final    Calcium 11/23/2022 9.6  8.7 - 10.5 mg/dL Final    Total Protein 11/23/2022 7.5  6.0 - 8.4 g/dL Final    Albumin 11/23/2022 4.0  3.5 - 5.2 g/dL Final    Total Bilirubin 11/23/2022 0.4  0.1 - 1.0 mg/dL Final    Comment: For infants and newborns, interpretation of results should be based  on gestational age, weight and in agreement with clinical  observations.    Premature Infant recommended reference ranges:  Up to 24 hours.............<8.0 mg/dL  Up to 48 hours............<12.0 mg/dL  3-5 days..................<15.0 mg/dL  6-29 days.................<15.0 mg/dL      Alkaline Phosphatase 11/23/2022 48 (L)  55 - 135 U/L Final    AST 11/23/2022 12  10 - 40 U/L Final    ALT 11/23/2022 9 (L)  10 - 44 U/L Final    Anion Gap 11/23/2022 8  8 - 16 mmol/L Final    eGFR 11/23/2022 >60.0  >60 mL/min/1.73 m^2 Final    Protein, Serum 11/23/2022 7.1  6.0 - 8.4 g/dL Final    Comment: Serum protein electrophoresis and immunofixation results should be   interpreted in clinical context in that some therapeutic agents can   result   in false positive results (example, daratumumab). Correlation with   the   patient s therapeutic regimen is required.      IgG 11/23/2022 1115  650 - 1600 mg/dL Final    IgG Cord Blood Reference Range: 650-1600 mg/dL.    IgA 11/23/2022 139  40 - 350 mg/dL Final    IgA Cord Blood Reference Range: <5 mg/dL.    IgM 11/23/2022 18 (L)  50 - 300 mg/dL Final    IgM Cord Blood Reference Range: <25 mg/dL.      Lab Results   Component Value Date    WBC 3.85 (L) 11/23/2022    HGB 8.0 (L) 11/23/2022    HCT 28.2 (L) 11/23/2022    MCV 76 (L) 11/23/2022     11/23/2022       Gran # (ANC)   Date Value Ref Range Status   11/23/2022 1.7 (L) 1.8 - 7.7 K/uL Final     Gran %   Date Value Ref Range Status   11/23/2022 43.1 38.0 - 73.0 % Final     Kappa Free Light Chains   Date Value Ref Range Status   10/27/2022 9.93 (H) 0.33 - 1.94 mg/dL Final   09/29/2022 10.56 (H) 0.33 - 1.94 mg/dL Final    09/01/2022 11.94 (H) 0.33 - 1.94 mg/dL Final     Lambda Free Light Chains   Date Value Ref Range Status   10/27/2022 2.89 (H) 0.57 - 2.63 mg/dL Final   09/29/2022 2.69 (H) 0.57 - 2.63 mg/dL Final   09/01/2022 2.63 0.57 - 2.63 mg/dL Final     Kappa/Lambda FLC Ratio   Date Value Ref Range Status   10/27/2022 3.44 (H) 0.26 - 1.65 Final     Comment:     Undetected antigen excess is a rare event but cannot   be excluded. If these free light chain results do not   agree with other clinical or laboratory findings or   if the sample is from a patient that has previously   demonstrated antigen excess, discuss with the testing   laboratory.   Results should always be interpreted in conjunction   with other laboratory tests and clinical evidence.     09/29/2022 3.93 (H) 0.26 - 1.65 Final     Comment:     Undetected antigen excess is a rare event but cannot   be excluded. If these free light chain results do not   agree with other clinical or laboratory findings or   if the sample is from a patient that has previously   demonstrated antigen excess, discuss with the testing   laboratory.   Results should always be interpreted in conjunction   with other laboratory tests and clinical evidence.     09/01/2022 4.54 (H) 0.26 - 1.65 Final     Comment:     Undetected antigen excess is a rare event but cannot   be excluded. If these free light chain results do not   agree with other clinical or laboratory findings or   if the sample is from a patient that has previously   demonstrated antigen excess, discuss with the testing   laboratory.   Results should always be interpreted in conjunction   with other laboratory tests and clinical evidence.       IgG   Date Value Ref Range Status   11/23/2022 1115 650 - 1600 mg/dL Final     Comment:     IgG Cord Blood Reference Range: 650-1600 mg/dL.     IgA   Date Value Ref Range Status   11/23/2022 139 40 - 350 mg/dL Final     Comment:     IgA Cord Blood Reference Range: <5 mg/dL.     IgM   Date  "Value Ref Range Status   11/23/2022 18 (L) 50 - 300 mg/dL Final     Comment:     IgM Cord Blood Reference Range: <25 mg/dL.        Imaging:      Assessment:       Ms. Thompson is a  72 y.o. femal with relapsed multiple myeloma.     Plan:     MM s/p Auto SCT  - high risk MM with 17p deletion  - 2 year 9 months  s/p Auto SCT  - started maintenance q2w velcade 5/6/20   - serial biochemical relapse  Noted and  given this and high risk CG , transitioned to Sravanthi/Noble (1mg/m2) /dex  Salvage July 2021    - Due to uncontrolled hyperglycemia dex stopped after C4.    -supportive meds:   continue ppx acyclovir, asa; resume maintenance zometa q 3 months for 1 year; last dose on 08/04/22, next dose today.  -she had demonstrated mild biochemical progression over summer 2022 studies with no CRAB, in light of this and high risk CG   we transitioned her therapy from Sravanthi/Velcade to Sravanthi/pomalyst; intolerant to dex.   - biochemical studies from 9/29/22 improved so plan to continue until intolerant or progression. Pomalyst 4 mg dose adjusted to 3 mg due to worsening cytopenia(11/22).  Cytopenia improved, patient tolerating dose adjusted pomalyst.  -already on asa 81mg   -post transplant vaccines completed     Cytopenia due to chemotherapy  -due to therapy, continue to monitor    - Continue to monitor  - Counts improved. Pom dose adjusted as above    Neuropathy  -Stable   - continues gabapentin and prn tramadol     SOB/descending aortic aneurysm  - CTA and dopplers ordered urgently with high concern for DVT/PE; completed 8/3/20  - incidental descending thoracic aortic aneurysm noted; referred to thoracic surgery; seen 8/7/20; per note: "at current size would not recommend any intervention as risk of rupture remains low.  Recommend surveillance CT chest every 12 months to monitor growth of the aneurysm.  Would typically recommend aspirin 81 mg daily in patients with aortic aneurysm  - ASA started (9/14/20)     HTN  - continue norvasc  - " Patient to monitor BP closely    Anxiety/depression/hallucinations/tremoc  - continue zoloft  -recommended she make appt with established neurologist to discuss mild occasional self resolving hallucinations. No f/u scheduled yet, will contact department.  -continue fu with neurology for above    DM2  - diet controlled    Hx of CVA  - s/p 2008, 2011    HLD with Statin Intolerance  -on praluent, PCSK9 inhibitor    L breast cancer DCIS stage 0  -s/p lumpectomy and radiation, no endocrine tx due to CVA       BMT Chart Routing      Follow up with physician    Follow up with CHRISTY . Keep scheduled lab, visit, chemo on 12/22/2022   Provider visit type    Infusion scheduling note    Injection scheduling note    Labs    Imaging    Pharmacy appointment    Other referrals        Adina Wright NP  Hematology/Oncology/BMT

## 2022-11-23 NOTE — PLAN OF CARE
Patient tolerated Sravanthi SQ today. Pt declined AVS, uses [facility]. Discharged home, ambulated independently.

## 2022-12-21 DIAGNOSIS — E11.9 TYPE 2 DIABETES MELLITUS WITHOUT COMPLICATION: ICD-10-CM

## 2022-12-21 NOTE — PROGRESS NOTES
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  She achieved and tolerated well VRd x completing 7, 28 day cycles and achieving deep VGPR to induction. Then underwent Melphalan autologous stem cell transplant on 2/12/2020.      Extensive PMH as below.    2 prior CVAs (one in 2008, one in 2011)  L breast cancer DCIS stage 0 (s/p lumpectomy and radiation, no endocrine tx due to CVA)  HTN  DM II  Neuropathy, b/l hands  Prior smoker, quit in 2011  Hepatic lesions - vascular per recent MRI in 09 /2019 with no changes; will confirm no further rascon needed from help  Statin Intolerance - on praluent, PCSK9 inhibitor  HFpEF - EF 55%, diastolic dysfunction  Anxiety/Depression     ADMISSION SUMMARY: 2/10-2/26/2020  Admitted 2/10, tolerated chemo and transplant well. Engrafted on day +12. Remained afebrile throughout hospital stay and no mucositis. Experienced expected side effects of nausea and diarrhea controlled with antiemetics and Imodium. Discharged home with home PT/OT       Subjective:       Initial History: Ms. Thompson is a 72 y.o. female who presents for virtual follow up.  She is 2 year 10  month post AutoSCT.  Relaped 1 year post sct and was on Kenia/zhanna/dex until summer 2022 when changed to kenia/pom/dex due to biohchemical only progression. Remains on kenia/pom/dex salvage. Tolerating dose adjusted pomalyst. Occasional hallucination and stable PN present. No other active complaints. Presents with her friend.    Past Medical History:   Past Medical History:   Diagnosis Date    Acute respiratory failure with hypoxia 4/30/2019    FUENTES (acute kidney injury)  5/1/2019    Allergy     Anemia     Aortic aneurysm     Breast cancer 10/2011    left breast Stage 0 DCIS    Cataract     Chronic diastolic congestive heart failure 11/6/2015    Colon polyp     Community acquired pneumonia of right lower lobe of lung 8/3/2021    GERD (gastroesophageal reflux disease)     Hiatal hernia     History of colonic polyps     Hx of psychiatric care     Zoloft    HX: breast cancer     Hyperlipemia     Hypertension     ICH (intracerebral hemorrhage)     Immunocompromised 10/28/2022    Major depressive disorder, single episode, mild 6/23/2016    Nuclear sclerosis 7/21/2014    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 7/21/2014    PAD (peripheral artery disease) 11/6/2015    Psychiatric problem     Statin-induced rhabdomyolysis     4/2015     Stroke 4/2011    Type 2 diabetes mellitus with diabetic peripheral angiopathy without gangrene, with long-term current use of insulin 3/31/2021    Type 2 diabetes mellitus without complication, without long-term current use of insulin 2/10/2020    Walker as ambulation aid        Past Surgical HIstory:   Past Surgical History:   Procedure Laterality Date    APPENDECTOMY      BONE MARROW BIOPSY Left 10/3/2019    Procedure: Biopsy-bone marrow;  Surgeon: Jere Tineo MD;  Location: [facility] OR [address] [place] [address];  Service: Oncology;  Laterality: Left;    BREAST BIOPSY Left 10/2011    BREAST LUMPECTOMY Left 2011    DCIS    COLONOSCOPY      COLONOSCOPY N/A 1/9/2020    Procedure: COLONOSCOPY;  Surgeon: Quang De La Cruz MD;  Location: [facility] (4TH FLR);  Service: Endoscopy;  Laterality: N/A;    CYST REMOVAL      back    ESOPHAGOGASTRODUODENOSCOPY  07/2016    duodenal angioectasia    ESOPHAGOGASTRODUODENOSCOPY N/A 1/9/2020    Procedure: EGD (ESOPHAGOGASTRODUODENOSCOPY);  Surgeon: Quang De La Cruz MD;  Location: [facility] (4TH FLR);  Service: Endoscopy;  Laterality: N/A;    FOOT FRACTURE SURGERY  10/2012    right foot, with R hallux valgus  repair    FRACTURE SURGERY Right     broken toe repiar    HEMORRHOID SURGERY      LIVER BIOPSY  04/2015    essentially normal, no fibrosis    TUBAL LIGATION      UPPER GASTROINTESTINAL ENDOSCOPY         Family History:   Family History   Problem Relation Age of Onset    Dementia Sister         x1 sister    Cancer Sister 63        Lung Cancer    No Known Problems Mother     Cancer Father     No Known Problems Brother     Hypertension Daughter     Fibroids Daughter         uterine    Hypertension Son     No Known Problems Brother     No Known Problems Maternal Aunt     No Known Problems Maternal Uncle     No Known Problems Paternal Aunt     No Known Problems Paternal Uncle     Hypertension Maternal Grandmother     No Known Problems Maternal Grandfather     No Known Problems Paternal Grandmother     No Known Problems Paternal Grandfather     Ovarian cancer Neg Hx     Colon cancer Neg Hx     Tremor Neg Hx     Amblyopia Neg Hx     Blindness Neg Hx     Cataracts Neg Hx     Diabetes Neg Hx     Glaucoma Neg Hx     Macular degeneration Neg Hx     Retinal detachment Neg Hx     Strabismus Neg Hx     Stroke Neg Hx     Thyroid disease Neg Hx     Esophageal cancer Neg Hx     Rectal cancer Neg Hx     Stomach cancer Neg Hx     Ulcerative colitis Neg Hx     Crohn's disease Neg Hx     Irritable bowel syndrome Neg Hx     Celiac disease Neg Hx        Social History:  reports that she quit smoking about 11 years ago. Her smoking use included cigarettes. She started smoking about 56 years ago. She has a 11.00 pack-year smoking history. She has never used smokeless tobacco. She reports that she does not currently use alcohol. She reports that she does not use drugs.    Allergies:  Review of patient's allergies indicates:   Allergen Reactions    Lipitor [atorvastatin] Itching     Itching, elevated cpk, muscle aches, statin induced myositis       Medications:  Current Outpatient Medications   Medication Sig Dispense Refill    acyclovir  (ZOVIRAX) 400 MG tablet Take 1 tablet (400 mg total) by mouth 2 (two) times daily. 60 tablet 2    albuterol (ACCUNEB) 1.25 mg/3 mL Nebu Take 3 mLs (1.25 mg total) by nebulization every 6 (six) hours as needed (wheeze/cough). Rescue 75 mL 2    alirocumab (PRALUENT PEN) 75 mg/mL PnIj Inject 1 mL (75 mg total) into the skin every 14 (fourteen) days. 6 mL 3    amLODIPine (NORVASC) 5 MG tablet TAKE 1 TABLET BY MOUTH EVERY DAY 90 tablet 2    aspirin (ECOTRIN) 81 MG EC tablet TAKE 1 TABLET BY MOUTH EVERY DAY 90 tablet 3    cholecalciferol, vitamin D3, 125 mcg (5,000 unit) Tab 1 tablet DAILY (route: oral)      DARZALEX FASPRO 1,800 mg-30,000 unit/15 mL Soln       ENGERIX-B, PF, 20 mcg/mL Syrg       ferrous gluconate 324 mg (37.5 mg iron) Tab tablet 1 tablet DAILY (route: oral)      gabapentin (NEURONTIN) 300 MG capsule Take 2 capsules (600 mg total) by mouth 2 (two) times daily. 180 capsule 2    GADAVIST 1 mmol/mL (604.72 mg/mL) Soln injection       metFORMIN (GLUCOPHAGE) 1000 MG tablet Take 1 tablet (1,000 mg total) by mouth 2 (two) times daily with meals. 180 tablet 0    omeprazole (PRILOSEC) 40 MG capsule TAKE 1 CAPSULE BY MOUTH EVERY DAY 90 capsule 3    OXYGEN-AIR DELIVERY SYSTEMS MISC 1-2 Liter O2 - CONTINUOUS (route: Oxygen)      pomalidomide (POMALYST) 3 mg Cap TAKE 1 CAPSULE BY MOUTH ONCE DAILY ON DAYS 1-21 OF 28 DAY CYCLE 21 capsule 0    potassium chloride SA (K-DUR,KLOR-CON) 20 MEQ tablet       sertraline (ZOLOFT) 50 MG tablet TAKE 1 TABLET BY MOUTH EVERY DAY 90 tablet 3    valsartan (DIOVAN) 160 MG tablet Take 1 tablet (160 mg total) by mouth once daily. 90 tablet 3    zoledronic 4 mg/100 mL PgBk infusion       zoledronic acid (ZOMETA) 4 mg/5 mL injection        No current facility-administered medications for this visit.      See HPI     ECOG Performance Status: 2 (due to remote  CVA)   Objective:      Vitals:   Vitals:    12/22/22 1005   BP: (!) 147/63   Pulse: 78   Resp: 16   Physical Exam  Constitutional:        Appearance: She is well-developed.   HENT:      Head: Normocephalic and atraumatic.      Mouth/Throat:      Mouth: Mucous membranes are moist. No oral lesions.      Pharynx: Oropharynx is clear.   Eyes:      Conjunctiva/sclera: Conjunctivae normal.   Cardiovascular:      Rate and Rhythm: Normal rate and regular rhythm.      Heart sounds: Normal heart sounds.   Pulmonary:      Effort: No respiratory distress.      Breath sounds: Normal breath sounds.   Abdominal:      General: Bowel sounds are normal.      Palpations: Abdomen is soft.   Musculoskeletal:         General: Normal range of motion.      Cervical back: Normal range of motion.      Comments: Unsteady gait   Skin:     General: Skin is warm and dry.   Neurological:      Mental Status: She is alert and oriented to person, place, and time.   Psychiatric:         Behavior: Behavior normal.      Comments: Occasional hallucination          Laboratory Data:  Lab Visit on 12/22/2022   Component Date Value Ref Range Status    WBC 12/22/2022 3.49 (L)  3.90 - 12.70 K/uL Final    RBC 12/22/2022 3.86 (L)  4.00 - 5.40 M/uL Final    Hemoglobin 12/22/2022 8.1 (L)  12.0 - 16.0 g/dL Final    Hematocrit 12/22/2022 29.0 (L)  37.0 - 48.5 % Final    MCV 12/22/2022 75 (L)  82 - 98 fL Final    MCH 12/22/2022 21.0 (L)  27.0 - 31.0 pg Final    MCHC 12/22/2022 27.9 (L)  32.0 - 36.0 g/dL Final    RDW 12/22/2022 21.3 (H)  11.5 - 14.5 % Final    Platelets 12/22/2022 237  150 - 450 K/uL Final    MPV 12/22/2022 SEE COMMENT  9.2 - 12.9 fL Final    Result not available.    Immature Granulocytes 12/22/2022 0.3  0.0 - 0.5 % Final    Gran # (ANC) 12/22/2022 2.0  1.8 - 7.7 K/uL Final    Immature Grans (Abs) 12/22/2022 0.01  0.00 - 0.04 K/uL Final    Comment: Mild elevation in immature granulocytes is non specific and   can be seen in a variety of conditions including stress response,   acute inflammation, trauma and pregnancy. Correlation with other   laboratory and clinical findings is  essential.      Lymph # 12/22/2022 1.1  1.0 - 4.8 K/uL Final    Mono # 12/22/2022 0.3  0.3 - 1.0 K/uL Final    Eos # 12/22/2022 0.1  0.0 - 0.5 K/uL Final    Baso # 12/22/2022 0.07  0.00 - 0.20 K/uL Final    nRBC 12/22/2022 0  0 /100 WBC Final    Gran % 12/22/2022 55.9  38.0 - 73.0 % Final    Lymph % 12/22/2022 30.4  18.0 - 48.0 % Final    Mono % 12/22/2022 7.7  4.0 - 15.0 % Final    Eosinophil % 12/22/2022 3.7  0.0 - 8.0 % Final    Basophil % 12/22/2022 2.0 (H)  0.0 - 1.9 % Final    Differential Method 12/22/2022 Automated   Final    Sodium 12/22/2022 142  136 - 145 mmol/L Final    Potassium 12/22/2022 3.8  3.5 - 5.1 mmol/L Final    Chloride 12/22/2022 108  95 - 110 mmol/L Final    CO2 12/22/2022 25  23 - 29 mmol/L Final    Glucose 12/22/2022 94  70 - 110 mg/dL Final    BUN 12/22/2022 13  8 - 23 mg/dL Final    Creatinine 12/22/2022 0.9  0.5 - 1.4 mg/dL Final    Calcium 12/22/2022 9.5  8.7 - 10.5 mg/dL Final    Total Protein 12/22/2022 7.5  6.0 - 8.4 g/dL Final    Albumin 12/22/2022 4.0  3.5 - 5.2 g/dL Final    Total Bilirubin 12/22/2022 0.4  0.1 - 1.0 mg/dL Final    Comment: For infants and newborns, interpretation of results should be based  on gestational age, weight and in agreement with clinical  observations.    Premature Infant recommended reference ranges:  Up to 24 hours.............<8.0 mg/dL  Up to 48 hours............<12.0 mg/dL  3-5 days..................<15.0 mg/dL  6-29 days.................<15.0 mg/dL      Alkaline Phosphatase 12/22/2022 48 (L)  55 - 135 U/L Final    AST 12/22/2022 14  10 - 40 U/L Final    ALT 12/22/2022 7 (L)  10 - 44 U/L Final    Anion Gap 12/22/2022 9  8 - 16 mmol/L Final    eGFR 12/22/2022 >60.0  >60 mL/min/1.73 m^2 Final    IgG 12/22/2022 1145  650 - 1600 mg/dL Final    IgG Cord Blood Reference Range: 650-1600 mg/dL.    IgA 12/22/2022 140  40 - 350 mg/dL Final    IgA Cord Blood Reference Range: <5 mg/dL.    IgM 12/22/2022 17 (L)  50 - 300 mg/dL Final    IgM Cord Blood Reference  Range: <25 mg/dL.    Protein, Serum 12/22/2022 7.2  6.0 - 8.4 g/dL Final    Comment: Serum protein electrophoresis and immunofixation results should be   interpreted in clinical context in that some therapeutic agents can   result   in false positive results (example, daratumumab). Correlation with   the   patient s therapeutic regimen is required.        Lab Results   Component Value Date    WBC 3.49 (L) 12/22/2022    HGB 8.1 (L) 12/22/2022    HCT 29.0 (L) 12/22/2022    MCV 75 (L) 12/22/2022     12/22/2022       Gran # (ANC)   Date Value Ref Range Status   12/22/2022 2.0 1.8 - 7.7 K/uL Final     Gran %   Date Value Ref Range Status   12/22/2022 55.9 38.0 - 73.0 % Final     Kappa Free Light Chains   Date Value Ref Range Status   11/23/2022 11.77 (H) 0.33 - 1.94 mg/dL Final   10/27/2022 9.93 (H) 0.33 - 1.94 mg/dL Final   09/29/2022 10.56 (H) 0.33 - 1.94 mg/dL Final     Lambda Free Light Chains   Date Value Ref Range Status   11/23/2022 2.18 0.57 - 2.63 mg/dL Final   10/27/2022 2.89 (H) 0.57 - 2.63 mg/dL Final   09/29/2022 2.69 (H) 0.57 - 2.63 mg/dL Final     Kappa/Lambda FLC Ratio   Date Value Ref Range Status   11/23/2022 5.40 (H) 0.26 - 1.65 Final     Comment:     Undetected antigen excess is a rare event but cannot   be excluded. If these free light chain results do not   agree with other clinical or laboratory findings or   if the sample is from a patient that has previously   demonstrated antigen excess, discuss with the testing   laboratory.   Results should always be interpreted in conjunction   with other laboratory tests and clinical evidence.     10/27/2022 3.44 (H) 0.26 - 1.65 Final     Comment:     Undetected antigen excess is a rare event but cannot   be excluded. If these free light chain results do not   agree with other clinical or laboratory findings or   if the sample is from a patient that has previously   demonstrated antigen excess, discuss with the testing   laboratory.   Results should  always be interpreted in conjunction   with other laboratory tests and clinical evidence.     09/29/2022 3.93 (H) 0.26 - 1.65 Final     Comment:     Undetected antigen excess is a rare event but cannot   be excluded. If these free light chain results do not   agree with other clinical or laboratory findings or   if the sample is from a patient that has previously   demonstrated antigen excess, discuss with the testing   laboratory.   Results should always be interpreted in conjunction   with other laboratory tests and clinical evidence.       IgG   Date Value Ref Range Status   12/22/2022 1145 650 - 1600 mg/dL Final     Comment:     IgG Cord Blood Reference Range: 650-1600 mg/dL.     IgA   Date Value Ref Range Status   12/22/2022 140 40 - 350 mg/dL Final     Comment:     IgA Cord Blood Reference Range: <5 mg/dL.     IgM   Date Value Ref Range Status   12/22/2022 17 (L) 50 - 300 mg/dL Final     Comment:     IgM Cord Blood Reference Range: <25 mg/dL.        Imaging:      Assessment:       Ms. Thompson is a  72 y.o. femal with relapsed multiple myeloma.     Plan:     MM s/p Auto SCT  - high risk MM with 17p deletion  - 2 year 10 months  s/p Auto SCT  - started maintenance q2w velcade 5/6/20   - serial biochemical relapse  Noted and  given this and high risk CG , transitioned to Sravanthi/Noble (1mg/m2) /dex  Salvage July 2021    - Due to uncontrolled hyperglycemia dex stopped after C4.    -supportive meds:   continue ppx acyclovir, asa; resume maintenance zometa q 3 months for 1 year; last dose on 08/04/22, next dose today.  -she had demonstrated mild biochemical progression over summer 2022 studies with no CRAB, in light of this and high risk CG   we transitioned her therapy from Sravanthi/Velcade to Sravanthi/pomalyst; intolerant to dex.   - biochemical studies from 9/29/22 improved so plan to continue until intolerant or progression. Pomalyst 4 mg dose adjusted to 3 mg due to worsening cytopenia(11/22).  Cytopenia improved, patient  "tolerating dose adjusted pomalyst.  -already on asa 81mg   -post transplant vaccines completed   - Recent MM studies on 11/23, remains stable. Today's pending    Cytopenia due to chemotherapy  -due to therapy, continue to monitor    - Continue to monitor  - Counts improved. Pom dose adjusted as above    Neuropathy  -Stable   - continues gabapentin and prn tramadol     SOB/descending aortic aneurysm  - CTA and dopplers ordered urgently with high concern for DVT/PE; completed 8/3/20  - incidental descending thoracic aortic aneurysm noted; referred to thoracic surgery; seen 8/7/20; per note: "at current size would not recommend any intervention as risk of rupture remains low.  Recommend surveillance CT chest every 12 months to monitor growth of the aneurysm.  Would typically recommend aspirin 81 mg daily in patients with aortic aneurysm  - ASA started (9/14/20)     HTN  - continue norvasc  - Patient to monitor BP closely    Anxiety/depression/hallucinations/tremoc  - continue zoloft  -recommended she make appt with established neurologist to discuss mild occasional self resolving hallucinations. f/u scheduled on 03/27. No early availability, so patient to keep the schedule.   -continue fu with neurology for above    DM2  - diet controlled    Hx of CVA  - s/p 2008, 2011    HLD with Statin Intolerance  -on praluent, PCSK9 inhibitor    L breast cancer DCIS stage 0  -s/p lumpectomy and radiation, no endocrine tx due to CVA       BMT Chart Routing      Follow up with physician . Keep scheduled MD visit on 01/27   Follow up with CHRISTY    Provider visit type    Infusion scheduling note    Injection scheduling note    Labs   Lab interval:  Keep scheduled lab, visit, chemo on 01/27   Imaging    Pharmacy appointment    Other referrals        Adina Wright NP  Hematology/Oncology/BMT                    "

## 2022-12-22 ENCOUNTER — INFUSION (OUTPATIENT)
Dept: INFUSION THERAPY | Facility: HOSPITAL | Age: 72
End: 2022-12-22
Attending: INTERNAL MEDICINE
Payer: MEDICARE

## 2022-12-22 ENCOUNTER — OFFICE VISIT (OUTPATIENT)
Dept: HEMATOLOGY/ONCOLOGY | Facility: CLINIC | Age: 72
End: 2022-12-22
Payer: MEDICARE

## 2022-12-22 VITALS
SYSTOLIC BLOOD PRESSURE: 134 MMHG | HEART RATE: 56 BPM | DIASTOLIC BLOOD PRESSURE: 64 MMHG | OXYGEN SATURATION: 97 % | TEMPERATURE: 99 F | RESPIRATION RATE: 16 BRPM

## 2022-12-22 VITALS
BODY MASS INDEX: 25.84 KG/M2 | HEART RATE: 78 BPM | HEIGHT: 63 IN | OXYGEN SATURATION: 98 % | DIASTOLIC BLOOD PRESSURE: 63 MMHG | SYSTOLIC BLOOD PRESSURE: 147 MMHG | WEIGHT: 145.81 LBS | RESPIRATION RATE: 16 BRPM

## 2022-12-22 DIAGNOSIS — I10 ESSENTIAL HYPERTENSION: ICD-10-CM

## 2022-12-22 DIAGNOSIS — C90.02 MULTIPLE MYELOMA IN RELAPSE: ICD-10-CM

## 2022-12-22 DIAGNOSIS — F32.A ANXIETY AND DEPRESSION: ICD-10-CM

## 2022-12-22 DIAGNOSIS — C90.00 MULTIPLE MYELOMA, REMISSION STATUS UNSPECIFIED: Primary | ICD-10-CM

## 2022-12-22 DIAGNOSIS — F41.9 ANXIETY AND DEPRESSION: ICD-10-CM

## 2022-12-22 DIAGNOSIS — R06.02 SHORTNESS OF BREATH: ICD-10-CM

## 2022-12-22 DIAGNOSIS — I69.30 HISTORY OF CVA WITH RESIDUAL DEFICIT: ICD-10-CM

## 2022-12-22 DIAGNOSIS — E78.00 PURE HYPERCHOLESTEROLEMIA: ICD-10-CM

## 2022-12-22 DIAGNOSIS — Z94.84 HISTORY OF AUTO STEM CELL TRANSPLANT: ICD-10-CM

## 2022-12-22 DIAGNOSIS — T50.905A DRUG-INDUCED CYTOPENIA: ICD-10-CM

## 2022-12-22 DIAGNOSIS — C90.01 MULTIPLE MYELOMA IN REMISSION: Primary | ICD-10-CM

## 2022-12-22 DIAGNOSIS — E11.9 TYPE 2 DIABETES MELLITUS WITHOUT COMPLICATION, WITHOUT LONG-TERM CURRENT USE OF INSULIN: ICD-10-CM

## 2022-12-22 DIAGNOSIS — D84.9 IMMUNOCOMPROMISED: ICD-10-CM

## 2022-12-22 DIAGNOSIS — D75.9 DRUG-INDUCED CYTOPENIA: ICD-10-CM

## 2022-12-22 PROCEDURE — 3288F FALL RISK ASSESSMENT DOCD: CPT | Mod: HCNC,CPTII,S$GLB, | Performed by: NURSE PRACTITIONER

## 2022-12-22 PROCEDURE — 1126F PR PAIN SEVERITY QUANTIFIED, NO PAIN PRESENT: ICD-10-PCS | Mod: HCNC,CPTII,S$GLB, | Performed by: NURSE PRACTITIONER

## 2022-12-22 PROCEDURE — 1159F PR MEDICATION LIST DOCUMENTED IN MEDICAL RECORD: ICD-10-PCS | Mod: HCNC,CPTII,S$GLB, | Performed by: NURSE PRACTITIONER

## 2022-12-22 PROCEDURE — 1126F AMNT PAIN NOTED NONE PRSNT: CPT | Mod: HCNC,CPTII,S$GLB, | Performed by: NURSE PRACTITIONER

## 2022-12-22 PROCEDURE — 3288F PR FALLS RISK ASSESSMENT DOCUMENTED: ICD-10-PCS | Mod: HCNC,CPTII,S$GLB, | Performed by: NURSE PRACTITIONER

## 2022-12-22 PROCEDURE — 99999 PR PBB SHADOW E&M-EST. PATIENT-LVL V: ICD-10-PCS | Mod: PBBFAC,HCNC,, | Performed by: NURSE PRACTITIONER

## 2022-12-22 PROCEDURE — 96401 CHEMO ANTI-NEOPL SQ/IM: CPT | Mod: HCNC

## 2022-12-22 PROCEDURE — 3078F DIAST BP <80 MM HG: CPT | Mod: HCNC,CPTII,S$GLB, | Performed by: NURSE PRACTITIONER

## 2022-12-22 PROCEDURE — 63600175 PHARM REV CODE 636 W HCPCS: Mod: TB,HCNC | Performed by: NURSE PRACTITIONER

## 2022-12-22 PROCEDURE — 3044F HG A1C LEVEL LT 7.0%: CPT | Mod: HCNC,CPTII,S$GLB, | Performed by: NURSE PRACTITIONER

## 2022-12-22 PROCEDURE — 1101F PR PT FALLS ASSESS DOC 0-1 FALLS W/OUT INJ PAST YR: ICD-10-PCS | Mod: HCNC,CPTII,S$GLB, | Performed by: NURSE PRACTITIONER

## 2022-12-22 PROCEDURE — 1101F PT FALLS ASSESS-DOCD LE1/YR: CPT | Mod: HCNC,CPTII,S$GLB, | Performed by: NURSE PRACTITIONER

## 2022-12-22 PROCEDURE — 4010F ACE/ARB THERAPY RXD/TAKEN: CPT | Mod: HCNC,CPTII,S$GLB, | Performed by: NURSE PRACTITIONER

## 2022-12-22 PROCEDURE — 3077F SYST BP >= 140 MM HG: CPT | Mod: HCNC,CPTII,S$GLB, | Performed by: NURSE PRACTITIONER

## 2022-12-22 PROCEDURE — 3078F PR MOST RECENT DIASTOLIC BLOOD PRESSURE < 80 MM HG: ICD-10-PCS | Mod: HCNC,CPTII,S$GLB, | Performed by: NURSE PRACTITIONER

## 2022-12-22 PROCEDURE — 1160F PR REVIEW ALL MEDS BY PRESCRIBER/CLIN PHARMACIST DOCUMENTED: ICD-10-PCS | Mod: HCNC,CPTII,S$GLB, | Performed by: NURSE PRACTITIONER

## 2022-12-22 PROCEDURE — 1159F MED LIST DOCD IN RCRD: CPT | Mod: HCNC,CPTII,S$GLB, | Performed by: NURSE PRACTITIONER

## 2022-12-22 PROCEDURE — 3008F PR BODY MASS INDEX (BMI) DOCUMENTED: ICD-10-PCS | Mod: HCNC,CPTII,S$GLB, | Performed by: NURSE PRACTITIONER

## 2022-12-22 PROCEDURE — 3008F BODY MASS INDEX DOCD: CPT | Mod: HCNC,CPTII,S$GLB, | Performed by: NURSE PRACTITIONER

## 2022-12-22 PROCEDURE — 3044F PR MOST RECENT HEMOGLOBIN A1C LEVEL <7.0%: ICD-10-PCS | Mod: HCNC,CPTII,S$GLB, | Performed by: NURSE PRACTITIONER

## 2022-12-22 PROCEDURE — 99999 PR PBB SHADOW E&M-EST. PATIENT-LVL V: CPT | Mod: PBBFAC,HCNC,, | Performed by: NURSE PRACTITIONER

## 2022-12-22 PROCEDURE — 3077F PR MOST RECENT SYSTOLIC BLOOD PRESSURE >= 140 MM HG: ICD-10-PCS | Mod: HCNC,CPTII,S$GLB, | Performed by: NURSE PRACTITIONER

## 2022-12-22 PROCEDURE — 99215 PR OFFICE/OUTPT VISIT, EST, LEVL V, 40-54 MIN: ICD-10-PCS | Mod: HCNC,S$GLB,, | Performed by: NURSE PRACTITIONER

## 2022-12-22 PROCEDURE — 4010F PR ACE/ARB THEARPY RXD/TAKEN: ICD-10-PCS | Mod: HCNC,CPTII,S$GLB, | Performed by: NURSE PRACTITIONER

## 2022-12-22 PROCEDURE — 99215 OFFICE O/P EST HI 40 MIN: CPT | Mod: HCNC,S$GLB,, | Performed by: NURSE PRACTITIONER

## 2022-12-22 PROCEDURE — 1160F RVW MEDS BY RX/DR IN RCRD: CPT | Mod: HCNC,CPTII,S$GLB, | Performed by: NURSE PRACTITIONER

## 2022-12-22 RX ORDER — EPINEPHRINE 0.3 MG/.3ML
0.3 INJECTION SUBCUTANEOUS ONCE AS NEEDED
Status: CANCELLED | OUTPATIENT
Start: 2022-12-22

## 2022-12-22 RX ORDER — EPINEPHRINE 0.3 MG/.3ML
0.3 INJECTION SUBCUTANEOUS ONCE AS NEEDED
Status: DISCONTINUED | OUTPATIENT
Start: 2022-12-22 | End: 2022-12-22 | Stop reason: HOSPADM

## 2022-12-22 RX ORDER — DIPHENHYDRAMINE HYDROCHLORIDE 50 MG/ML
50 INJECTION INTRAMUSCULAR; INTRAVENOUS ONCE AS NEEDED
Status: CANCELLED | OUTPATIENT
Start: 2022-12-22

## 2022-12-22 RX ORDER — DIPHENHYDRAMINE HYDROCHLORIDE 50 MG/ML
50 INJECTION INTRAMUSCULAR; INTRAVENOUS ONCE AS NEEDED
Status: DISCONTINUED | OUTPATIENT
Start: 2022-12-22 | End: 2022-12-22 | Stop reason: HOSPADM

## 2022-12-22 RX ADMIN — DARATUMUMAB AND HYALURONIDASE-FIHJ (HUMAN RECOMBINANT) 1800 MG: 1800; 30000 INJECTION SUBCUTANEOUS at 11:12

## 2022-12-22 NOTE — PLAN OF CARE
1100 Patient here for daratumumab SQ. Labs, meds and allergies reviewed. Patient seen by MD this am and orders adjusted and signed for treatment. Assessment  and VS done. Vernon and snack offered.

## 2022-12-22 NOTE — PLAN OF CARE
1200 Patient tolerated daratuimumab SQ with no issues. Bandaid to abdomen. AVS given to patient and instructed her to call MD office for any concerns. Ambulated out with friend.

## 2023-01-10 ENCOUNTER — OFFICE VISIT (OUTPATIENT)
Dept: OPTOMETRY | Facility: CLINIC | Age: 73
End: 2023-01-10
Payer: MEDICARE

## 2023-01-10 ENCOUNTER — LAB VISIT (OUTPATIENT)
Dept: LAB | Facility: HOSPITAL | Age: 73
End: 2023-01-10
Attending: INTERNAL MEDICINE
Payer: MEDICARE

## 2023-01-10 DIAGNOSIS — H25.13 NUCLEAR SCLEROSIS OF BOTH EYES: ICD-10-CM

## 2023-01-10 DIAGNOSIS — E78.00 PURE HYPERCHOLESTEROLEMIA: ICD-10-CM

## 2023-01-10 DIAGNOSIS — H52.4 PRESBYOPIA: ICD-10-CM

## 2023-01-10 DIAGNOSIS — H25.041 POSTERIOR SUBCAPSULAR AGE-RELATED CATARACT OF RIGHT EYE: ICD-10-CM

## 2023-01-10 DIAGNOSIS — H40.013 OPEN ANGLE WITH BORDERLINE FINDINGS OF BOTH EYES: ICD-10-CM

## 2023-01-10 DIAGNOSIS — E11.51 TYPE 2 DIABETES MELLITUS WITH DIABETIC PERIPHERAL ANGIOPATHY WITHOUT GANGRENE, WITH LONG-TERM CURRENT USE OF INSULIN: ICD-10-CM

## 2023-01-10 DIAGNOSIS — Z79.4 TYPE 2 DIABETES MELLITUS WITH DIABETIC PERIPHERAL ANGIOPATHY WITHOUT GANGRENE, WITH LONG-TERM CURRENT USE OF INSULIN: ICD-10-CM

## 2023-01-10 DIAGNOSIS — E11.9 TYPE 2 DIABETES MELLITUS WITHOUT OPHTHALMIC MANIFESTATIONS: Primary | ICD-10-CM

## 2023-01-10 LAB
ALBUMIN SERPL BCP-MCNC: 3.8 G/DL (ref 3.5–5.2)
ALP SERPL-CCNC: 50 U/L (ref 55–135)
ALT SERPL W/O P-5'-P-CCNC: 10 U/L (ref 10–44)
ANION GAP SERPL CALC-SCNC: 10 MMOL/L (ref 8–16)
AST SERPL-CCNC: 13 U/L (ref 10–40)
BILIRUB SERPL-MCNC: 0.5 MG/DL (ref 0.1–1)
BUN SERPL-MCNC: 10 MG/DL (ref 8–23)
CALCIUM SERPL-MCNC: 8.7 MG/DL (ref 8.7–10.5)
CHLORIDE SERPL-SCNC: 107 MMOL/L (ref 95–110)
CHOLEST SERPL-MCNC: 126 MG/DL (ref 120–199)
CHOLEST/HDLC SERPL: 2.2 {RATIO} (ref 2–5)
CO2 SERPL-SCNC: 25 MMOL/L (ref 23–29)
CREAT SERPL-MCNC: 0.9 MG/DL (ref 0.5–1.4)
EST. GFR  (NO RACE VARIABLE): >60 ML/MIN/1.73 M^2
ESTIMATED AVG GLUCOSE: 117 MG/DL (ref 68–131)
GLUCOSE SERPL-MCNC: 80 MG/DL (ref 70–110)
HBA1C MFR BLD: 5.7 % (ref 4–5.6)
HDLC SERPL-MCNC: 58 MG/DL (ref 40–75)
HDLC SERPL: 46 % (ref 20–50)
LDLC SERPL CALC-MCNC: 45.2 MG/DL (ref 63–159)
NONHDLC SERPL-MCNC: 68 MG/DL
POTASSIUM SERPL-SCNC: 4 MMOL/L (ref 3.5–5.1)
PROT SERPL-MCNC: 7.1 G/DL (ref 6–8.4)
SODIUM SERPL-SCNC: 142 MMOL/L (ref 136–145)
TRIGL SERPL-MCNC: 114 MG/DL (ref 30–150)

## 2023-01-10 PROCEDURE — 1101F PR PT FALLS ASSESS DOC 0-1 FALLS W/OUT INJ PAST YR: ICD-10-PCS | Mod: HCNC,CPTII,S$GLB, | Performed by: OPTOMETRIST

## 2023-01-10 PROCEDURE — 92133 POSTERIOR SEGMENT OCT OPTIC NERVE(OCULAR COHERENCE TOMOGRAPHY) - OU - BOTH EYES: ICD-10-PCS | Mod: HCNC,S$GLB,, | Performed by: OPTOMETRIST

## 2023-01-10 PROCEDURE — 36415 COLL VENOUS BLD VENIPUNCTURE: CPT | Mod: HCNC | Performed by: INTERNAL MEDICINE

## 2023-01-10 PROCEDURE — 99999 PR PBB SHADOW E&M-EST. PATIENT-LVL III: CPT | Mod: PBBFAC,HCNC,, | Performed by: OPTOMETRIST

## 2023-01-10 PROCEDURE — 3288F FALL RISK ASSESSMENT DOCD: CPT | Mod: HCNC,CPTII,S$GLB, | Performed by: OPTOMETRIST

## 2023-01-10 PROCEDURE — 1159F MED LIST DOCD IN RCRD: CPT | Mod: HCNC,CPTII,S$GLB, | Performed by: OPTOMETRIST

## 2023-01-10 PROCEDURE — 3288F PR FALLS RISK ASSESSMENT DOCUMENTED: ICD-10-PCS | Mod: HCNC,CPTII,S$GLB, | Performed by: OPTOMETRIST

## 2023-01-10 PROCEDURE — 1101F PT FALLS ASSESS-DOCD LE1/YR: CPT | Mod: HCNC,CPTII,S$GLB, | Performed by: OPTOMETRIST

## 2023-01-10 PROCEDURE — 80061 LIPID PANEL: CPT | Mod: HCNC | Performed by: INTERNAL MEDICINE

## 2023-01-10 PROCEDURE — 1126F AMNT PAIN NOTED NONE PRSNT: CPT | Mod: HCNC,CPTII,S$GLB, | Performed by: OPTOMETRIST

## 2023-01-10 PROCEDURE — 92014 PR EYE EXAM, EST PATIENT,COMPREHESV: ICD-10-PCS | Mod: HCNC,S$GLB,, | Performed by: OPTOMETRIST

## 2023-01-10 PROCEDURE — 2023F PR DILATED RETINAL EXAM W/O EVID OF RETINOPATHY: ICD-10-PCS | Mod: HCNC,CPTII,S$GLB, | Performed by: OPTOMETRIST

## 2023-01-10 PROCEDURE — 2023F DILAT RTA XM W/O RTNOPTHY: CPT | Mod: HCNC,CPTII,S$GLB, | Performed by: OPTOMETRIST

## 2023-01-10 PROCEDURE — 1159F PR MEDICATION LIST DOCUMENTED IN MEDICAL RECORD: ICD-10-PCS | Mod: HCNC,CPTII,S$GLB, | Performed by: OPTOMETRIST

## 2023-01-10 PROCEDURE — 1126F PR PAIN SEVERITY QUANTIFIED, NO PAIN PRESENT: ICD-10-PCS | Mod: HCNC,CPTII,S$GLB, | Performed by: OPTOMETRIST

## 2023-01-10 PROCEDURE — 99999 PR PBB SHADOW E&M-EST. PATIENT-LVL III: ICD-10-PCS | Mod: PBBFAC,HCNC,, | Performed by: OPTOMETRIST

## 2023-01-10 PROCEDURE — 3044F PR MOST RECENT HEMOGLOBIN A1C LEVEL <7.0%: ICD-10-PCS | Mod: HCNC,CPTII,S$GLB, | Performed by: OPTOMETRIST

## 2023-01-10 PROCEDURE — 92014 COMPRE OPH EXAM EST PT 1/>: CPT | Mod: HCNC,S$GLB,, | Performed by: OPTOMETRIST

## 2023-01-10 PROCEDURE — 83036 HEMOGLOBIN GLYCOSYLATED A1C: CPT | Mod: HCNC | Performed by: INTERNAL MEDICINE

## 2023-01-10 PROCEDURE — 92133 CPTRZD OPH DX IMG PST SGM ON: CPT | Mod: HCNC,S$GLB,, | Performed by: OPTOMETRIST

## 2023-01-10 PROCEDURE — 80053 COMPREHEN METABOLIC PANEL: CPT | Mod: HCNC | Performed by: INTERNAL MEDICINE

## 2023-01-10 PROCEDURE — 3044F HG A1C LEVEL LT 7.0%: CPT | Mod: HCNC,CPTII,S$GLB, | Performed by: OPTOMETRIST

## 2023-01-10 NOTE — PROGRESS NOTES
HPI    Last eye exam was 12/29/20 with Dr. Simental.  Patient states for the past 3 months, overall vision has been blurry with   current glasses. Patient's daughter states she had memory problems.  Patient denies diplopia, headaches, flashes/floaters, and pain.    Hemoglobin A1C       Date                     Value               Ref Range             Status                03/17/2022               5.7 (H)             4.0 - 5.6 %           Final               Last edited by Katherine Guo MA on 1/10/2023  9:20 AM.            Assessment /Plan     For exam results, see Encounter Report.    Type 2 diabetes mellitus without ophthalmic manifestations  -     Ambulatory referral/consult to Optometry    Nuclear sclerosis of both eyes    Posterior subcapsular age-related cataract of right eye    Open angle with borderline findings of both eyes    Presbyopia          No retinopathy--monitor yearly.  BS control.   2-3.  Educated on cataracts and affects on vision.  Recommend cataract surgery but patient wants to wait.  Having treatment for bone cancer.  4.  Due to increased c/d ratio.  OCT and eye pressure normal OU.  No family history of glaucoma.  Most likely physiological.  VMT OS.  Monitor yearly  5.  Bifocal rx given.  Retina flat and intact OU--no holes, tears, breaks, or RDs.

## 2023-01-17 ENCOUNTER — IMMUNIZATION (OUTPATIENT)
Dept: INTERNAL MEDICINE | Facility: CLINIC | Age: 73
End: 2023-01-17
Payer: MEDICARE

## 2023-01-17 ENCOUNTER — OFFICE VISIT (OUTPATIENT)
Dept: INTERNAL MEDICINE | Facility: CLINIC | Age: 73
End: 2023-01-17
Payer: MEDICARE

## 2023-01-17 ENCOUNTER — IMMUNIZATION (OUTPATIENT)
Dept: PHARMACY | Facility: CLINIC | Age: 73
End: 2023-01-17
Payer: MEDICARE

## 2023-01-17 VITALS
DIASTOLIC BLOOD PRESSURE: 78 MMHG | HEART RATE: 61 BPM | SYSTOLIC BLOOD PRESSURE: 98 MMHG | OXYGEN SATURATION: 96 % | HEIGHT: 63 IN | WEIGHT: 147.69 LBS | BODY MASS INDEX: 26.17 KG/M2

## 2023-01-17 DIAGNOSIS — Z94.84 HISTORY OF AUTO STEM CELL TRANSPLANT: ICD-10-CM

## 2023-01-17 DIAGNOSIS — I27.9 PULMONARY HEART DISEASE: ICD-10-CM

## 2023-01-17 DIAGNOSIS — F33.42 MAJOR DEPRESSIVE DISORDER, RECURRENT, IN FULL REMISSION: ICD-10-CM

## 2023-01-17 DIAGNOSIS — J44.9 CHRONIC OBSTRUCTIVE PULMONARY DISEASE, UNSPECIFIED COPD TYPE: ICD-10-CM

## 2023-01-17 DIAGNOSIS — Z23 NEEDS FLU SHOT: Primary | ICD-10-CM

## 2023-01-17 DIAGNOSIS — C90.01 MULTIPLE MYELOMA IN REMISSION: ICD-10-CM

## 2023-01-17 DIAGNOSIS — G63 NEUROPATHY ASSOCIATED WITH CANCER: ICD-10-CM

## 2023-01-17 DIAGNOSIS — I82.0 BUDD-CHIARI SYNDROME: ICD-10-CM

## 2023-01-17 DIAGNOSIS — C90.00 MULTIPLE MYELOMA, REMISSION STATUS UNSPECIFIED: ICD-10-CM

## 2023-01-17 DIAGNOSIS — F33.42 RECURRENT MAJOR DEPRESSIVE DISORDER, IN FULL REMISSION: ICD-10-CM

## 2023-01-17 DIAGNOSIS — C80.1 NEUROPATHY ASSOCIATED WITH CANCER: ICD-10-CM

## 2023-01-17 DIAGNOSIS — E11.40 TYPE 2 DIABETES MELLITUS WITH DIABETIC NEUROPATHY, WITHOUT LONG-TERM CURRENT USE OF INSULIN: ICD-10-CM

## 2023-01-17 DIAGNOSIS — D84.9 IMMUNOCOMPROMISED: ICD-10-CM

## 2023-01-17 DIAGNOSIS — I69.351 HEMIPLEGIA AND HEMIPARESIS FOLLOWING CEREBRAL INFARCTION AFFECTING RIGHT DOMINANT SIDE: ICD-10-CM

## 2023-01-17 DIAGNOSIS — Z23 NEED FOR VACCINATION: Primary | ICD-10-CM

## 2023-01-17 PROCEDURE — 99999 PR PBB SHADOW E&M-EST. PATIENT-LVL III: ICD-10-PCS | Mod: PBBFAC,HCNC,, | Performed by: INTERNAL MEDICINE

## 2023-01-17 PROCEDURE — G0008 ADMIN INFLUENZA VIRUS VAC: HCPCS | Mod: HCNC,S$GLB,, | Performed by: INTERNAL MEDICINE

## 2023-01-17 PROCEDURE — 90694 FLU VACCINE - QUADRIVALENT - ADJUVANTED: ICD-10-PCS | Mod: HCNC,S$GLB,, | Performed by: INTERNAL MEDICINE

## 2023-01-17 PROCEDURE — 1126F AMNT PAIN NOTED NONE PRSNT: CPT | Mod: HCNC,CPTII,S$GLB, | Performed by: INTERNAL MEDICINE

## 2023-01-17 PROCEDURE — 1101F PT FALLS ASSESS-DOCD LE1/YR: CPT | Mod: HCNC,CPTII,S$GLB, | Performed by: INTERNAL MEDICINE

## 2023-01-17 PROCEDURE — 3008F BODY MASS INDEX DOCD: CPT | Mod: HCNC,CPTII,S$GLB, | Performed by: INTERNAL MEDICINE

## 2023-01-17 PROCEDURE — 99214 OFFICE O/P EST MOD 30 MIN: CPT | Mod: HCNC,S$GLB,, | Performed by: INTERNAL MEDICINE

## 2023-01-17 PROCEDURE — 3078F PR MOST RECENT DIASTOLIC BLOOD PRESSURE < 80 MM HG: ICD-10-PCS | Mod: HCNC,CPTII,S$GLB, | Performed by: INTERNAL MEDICINE

## 2023-01-17 PROCEDURE — 3008F PR BODY MASS INDEX (BMI) DOCUMENTED: ICD-10-PCS | Mod: HCNC,CPTII,S$GLB, | Performed by: INTERNAL MEDICINE

## 2023-01-17 PROCEDURE — 3074F SYST BP LT 130 MM HG: CPT | Mod: HCNC,CPTII,S$GLB, | Performed by: INTERNAL MEDICINE

## 2023-01-17 PROCEDURE — 99499 UNLISTED E&M SERVICE: CPT | Mod: HCNC,S$GLB,, | Performed by: INTERNAL MEDICINE

## 2023-01-17 PROCEDURE — 99214 PR OFFICE/OUTPT VISIT, EST, LEVL IV, 30-39 MIN: ICD-10-PCS | Mod: HCNC,S$GLB,, | Performed by: INTERNAL MEDICINE

## 2023-01-17 PROCEDURE — 3044F PR MOST RECENT HEMOGLOBIN A1C LEVEL <7.0%: ICD-10-PCS | Mod: HCNC,CPTII,S$GLB, | Performed by: INTERNAL MEDICINE

## 2023-01-17 PROCEDURE — 1159F PR MEDICATION LIST DOCUMENTED IN MEDICAL RECORD: ICD-10-PCS | Mod: HCNC,CPTII,S$GLB, | Performed by: INTERNAL MEDICINE

## 2023-01-17 PROCEDURE — 1159F MED LIST DOCD IN RCRD: CPT | Mod: HCNC,CPTII,S$GLB, | Performed by: INTERNAL MEDICINE

## 2023-01-17 PROCEDURE — 3074F PR MOST RECENT SYSTOLIC BLOOD PRESSURE < 130 MM HG: ICD-10-PCS | Mod: HCNC,CPTII,S$GLB, | Performed by: INTERNAL MEDICINE

## 2023-01-17 PROCEDURE — 1126F PR PAIN SEVERITY QUANTIFIED, NO PAIN PRESENT: ICD-10-PCS | Mod: HCNC,CPTII,S$GLB, | Performed by: INTERNAL MEDICINE

## 2023-01-17 PROCEDURE — 3288F FALL RISK ASSESSMENT DOCD: CPT | Mod: HCNC,CPTII,S$GLB, | Performed by: INTERNAL MEDICINE

## 2023-01-17 PROCEDURE — 1101F PR PT FALLS ASSESS DOC 0-1 FALLS W/OUT INJ PAST YR: ICD-10-PCS | Mod: HCNC,CPTII,S$GLB, | Performed by: INTERNAL MEDICINE

## 2023-01-17 PROCEDURE — 3044F HG A1C LEVEL LT 7.0%: CPT | Mod: HCNC,CPTII,S$GLB, | Performed by: INTERNAL MEDICINE

## 2023-01-17 PROCEDURE — 90694 VACC AIIV4 NO PRSRV 0.5ML IM: CPT | Mod: HCNC,S$GLB,, | Performed by: INTERNAL MEDICINE

## 2023-01-17 PROCEDURE — G0008 FLU VACCINE - QUADRIVALENT - ADJUVANTED: ICD-10-PCS | Mod: HCNC,S$GLB,, | Performed by: INTERNAL MEDICINE

## 2023-01-17 PROCEDURE — 3078F DIAST BP <80 MM HG: CPT | Mod: HCNC,CPTII,S$GLB, | Performed by: INTERNAL MEDICINE

## 2023-01-17 PROCEDURE — 99999 PR PBB SHADOW E&M-EST. PATIENT-LVL III: CPT | Mod: PBBFAC,HCNC,, | Performed by: INTERNAL MEDICINE

## 2023-01-17 PROCEDURE — 99499 RISK ADDL DX/OHS AUDIT: ICD-10-PCS | Mod: HCNC,S$GLB,, | Performed by: INTERNAL MEDICINE

## 2023-01-17 PROCEDURE — 3288F PR FALLS RISK ASSESSMENT DOCUMENTED: ICD-10-PCS | Mod: HCNC,CPTII,S$GLB, | Performed by: INTERNAL MEDICINE

## 2023-01-17 RX ORDER — SERTRALINE HYDROCHLORIDE 50 MG/1
50 TABLET, FILM COATED ORAL DAILY
Qty: 90 TABLET | Refills: 3 | Status: SHIPPED | OUTPATIENT
Start: 2023-01-17 | End: 2023-02-03

## 2023-01-17 RX ORDER — GABAPENTIN 300 MG/1
300 CAPSULE ORAL 2 TIMES DAILY
Qty: 180 CAPSULE | Refills: 2
Start: 2023-01-17 | End: 2023-03-28 | Stop reason: SDUPTHER

## 2023-01-17 NOTE — PROGRESS NOTES
This note was generated with [employer] voice recognition software. I apologize for any possible typographical errors.        Subjective:       Patient ID:  Shelby is a 72 y.o. female being seen for follow up 6 weeks.       Chief Complaint: Follow-up-  6  months. NO hospital of ED visit since last visit.       Oncologic History: Last seen by Hem/onc 12/22/2022.      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  She achieved and tolerated well VRd x completing 7, 28 day cycles and achieving deep VGPR to induction. Then underwent Melphalan autologous stem cell transplant on 2/12/2020.   ( she saw Onc NP last month--  note reviewed-- they have her set up to see Neurology in MARCH 2023)    70 yo F with multiple myeloma (follows with Heme/Onc, s/p stem cell transplant Feb 2020), T2DM, thrombocytopenia, depression, drug induced neuropathy, chronic diastolic heart failure, hx of stroke (one in 2008, one in 2011) with hemiplegia on right side- using cane , HLD, anemia, colon polyps who presents for follow up. Prior smoker, quit in 2011.  She is accompanied by her .     She has COPD-- has nt been using her inhalers.  She has not had to use home o2 recently.  SHe has avoided URI and pneumonias this winter.              Fatigue: less fatigue than before-- doing pretty good per her ( she is still getting tired a lot per her friend) ,   Increased appetite from before, eats 3 meals per day, consuming ice cream about 3x/week . Sometimes will eat only 2 meals if she is sleeping.  Weight loss/gain:wt down 3 #   since last visit.  Things don't taste like they use to.  SHe has ensure but does not drink it all the time.            Wt Readings from Last 10 Encounters:  7/18/2022-  150lbs  4/18/2022 150 lbs.    03/08/22 : 69.5 kg (153 lb 3.5 oz)  02/17/22 : 68.6 kg (151 lb 2 oz)  01/27/22 : 69.1 kg (152 lb 5.4 oz)  01/20/22 : 69.1 kg (152 lb 5.4 oz)  01/20/22 : 69.1 kg (152 lb 5.4 oz)  12/17/21 : 72 kg (158 lb 11.7 oz)  12/16/21 : 72 kg (158 lb 9.9 oz)  12/06/21 : 81.2 kg (179 lb)  12/03/21 : 77.4 kg (170 lb 10.2 oz)           Right hand twitching, chronic.   Still able to cook.  helps with med administration and keeping track of blood sugars.   Elevates feet for leg swelling, uses compression stockings.      Anemia: most recent Hgb 8.1/29.0.   HTN: home BP 1 98/78 's   DM: most recent A1c 5.7 . On Metformin 1000 BID. Blood sugar at home 85        HLD with Statin Intolerance: on praluent, PCSK9 inhibitor-- lipid panel form 1/23 reviewed       htn-- has been running higher-- bp 142/88  And 138/87 on recheck  On diovan 160 mg a day  Amlodipine 5 mg a day       Depression-- having hallucinations-- sees lady sitting on step or a man standing by the truck,.  She is getting confused.  NO SI or HI>  She is suppose to be on zofloft but she forgot this and has not tired this yet.                Review of Systems   Constitutional: Negative for fever.   HENT: Negative for sore throat.    Eyes: Negative for blurred vision.   Respiratory: Negative for cough and shortness of breath.    Cardiovascular: Positive for leg swelling. Negative for chest pain.   Gastrointestinal: Negative for abdominal pain, constipation, diarrhea, nausea and vomiting.   Genitourinary: Negative for dysuria.   Musculoskeletal: Positive for myalgias.   Neurological: Negative for headaches.   Psychiatric/Behavioral: Positive or depression and she has hallucinations at times.  .               Patient Active Problem List   Diagnosis    Personal history of malignant neoplasm of breast    Mononeuritis of upper limb    Hyperlipemia    Gastropathy    Tremor    Nuclear sclerosis    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 glaucoma  "risk in both eyes    Thoracic aortic aneurysm without rupture    Aortic atherosclerosis    History of CVA with residual deficit    Osteopenia    PAD (peripheral artery disease)    Liver mass    Recurrent major depressive disorder, in full remission    Statin intolerance    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)    Pulmonary heart disease    Ectatic aorta    Tortuous aorta    Acute respiratory failure with hypoxia    Budd-Chiari syndrome    PVC (premature ventricular contraction)    Multiple myeloma in relapse    Metastatic multiple myeloma to bone    Status post autologous bone marrow transplant    Abnormal PFT    Infection due to human metapneumovirus (hMPV)    Iron deficiency anemia due to chronic blood loss    History of colon polyps    Drug-induced polyneuropathy    Essential hypertension    Type 2 diabetes mellitus without complication, without long-term current use of insulin    Multiple myeloma in remission    Anemia in neoplastic disease    Hemiplegia and hemiparesis following cerebral infarction affecting right dominant side    Abnormal findings on diagnostic imaging of liver and biliary tract     Type 2 diabetes mellitus with diabetic peripheral angiopathy without gangrene, with long-term current use of insulin    History of auto stem cell transplant    Community acquired pneumonia of right lower lobe of lung    Thrombocytopenia, unspecified    Chronic obstructive pulmonary disease, unspecified COPD type    Mild neurocognitive disorder due to multiple etiologies             Objective:        BP 98/78 (BP Location: Right arm, Patient Position: Sitting, BP Method: Medium (Manual))   Pulse 61   Ht 5' 3" (1.6 m)   Wt 67 kg (147 lb 11.3 oz)   LMP  (LMP Unknown)   SpO2 96%   BMI 26.17 kg/m²      Recheck bp 124/80     Physical Exam  Off o2-   Constitutional:       Appearance: Normal appearance.      Comments: Pleasant, in good spirits   HENT:      Head: Normocephalic and atraumatic.     "  Nose: Nose normal.      Mouth/Throat:      Mouth: Mucous membranes are moist.   Eyes:      General: No scleral icterus.     Conjunctiva/sclera: Conjunctivae normal.   Cardiovascular:      Rate and Rhythm: Normal rate and regular rhythm.      Pulses: Normal pulses.      Heart sounds: Normal heart sounds. No murmur heard.  Pulmonary:      Effort: Pulmonary effort is normal. No respiratory distress.      Breath sounds: Normal breath sounds. No wheezing or rales.   Abdominal:      General: Abdomen is flat. There is no distension.      Palpations: Abdomen is soft.      Tenderness: There is no abdominal tenderness. There is no guarding.   Musculoskeletal:         General: No swelling, tenderness or deformity.      Cervical back: Normal range of motion. No rigidity.      Right lower leg: No edema.      Left lower leg: No edema.    twitchy right arms--         Skin:     General: Skin is warm and dry.      Coloration: Skin is not jaundiced.   Neurological:      General: No focal deficit present.      Mental Status: She is alert and oriented to person, place, and time.   Psychiatric:         Mood and Affect: Mood normal.         Behavior: Behavior normal.             Assessment and Plan:             Type 2 diabetes mellitus without complication- doing well.       Multiple myeloma - being treated-- following with ONC-      Metastatic multiple myeloma to bone     Pulmonary heart disease-- o2 sats ok-- off O2 - still use occassionally       Type 2 diabetes mellitus with diabetic peripheral angiopathy without gangrene, with long-term current use of insulin-- doing fine      Thrombocytopenia, unspecified-- stable- better-- plt count 237-- onc monitoring       Multiple myeloma in remission     Anemia in neoplastic disease     History of auto stem cell transplant     Chronic obstructive pulmonary disease, unspecified COPD type     From CT on 4/25/2022  Was reviewed        Stable appearance of saccular aneurysmal dilatation at the  distal thoracic aorta near the level of diaphragm with associated mural thrombus not significantly changed.        Plan:  Last visit I recommended that she stay on her o2 24/7 but she is off and not using o2-- o2 sat is 98.   She has COPD with a lot of emphysematous changes of her lungs and she has pulmonary heart disease.  I believe the oxygen would be beneficial to work to decrease her risk of right heart failure. May help with confusion-- suggest trial at home and see it this helps.  She will seen neurology for confusion in March.    I will restart her zoloft to see if this helps.       On review of her recent ct of her chest/abdomen --  saccular aneurysm she has in her thoracic aorta - stable at 4.5 cm - and the lung consolidation has resolved.   .     Weight lost graduate-- suggest ensure daily with 2 regular meals.    I will see her back again in 3 months or sooner if needed.  Will cut down gabapentin to 300 mg bid.  She wishes to do this

## 2023-01-17 NOTE — PROGRESS NOTES
Two pt identifier verified. Pt tolerated well. Advised pt to wait 15 minutes post immunization. Pt verbalized understanding.    Mary JOHNSON

## 2023-01-24 DIAGNOSIS — C90.00 MULTIPLE MYELOMA, REMISSION STATUS UNSPECIFIED: ICD-10-CM

## 2023-01-26 ENCOUNTER — TELEPHONE (OUTPATIENT)
Dept: HEMATOLOGY/ONCOLOGY | Facility: CLINIC | Age: 73
End: 2023-01-26
Payer: MEDICARE

## 2023-01-26 DIAGNOSIS — Z12.31 VISIT FOR SCREENING MAMMOGRAM: Primary | ICD-10-CM

## 2023-01-26 NOTE — TELEPHONE ENCOUNTER
Mammo ordered for pt. Tried contacting pt in regards of scheduling mammo, pt  informed me that she is on the phone currently setting up her mammogram appointment with pt access.    Mary JOHNSON

## 2023-01-26 NOTE — TELEPHONE ENCOUNTER
----- Message from Em Garvin sent at 1/26/2023  8:41 AM CST -----  Contact: pt  Pt requesting a callback to speak with a nurse and pt need orders for mammo             Confirmed contact below:  Contact Name:Shelby Thompson  Phone Number: [phone]

## 2023-01-26 NOTE — TELEPHONE ENCOUNTER
Pt inquiring about MMG order. Advised pt contact PCP for MMG order as Dr. Arevalo has been ordering previously. Pt verbalized understanding and will call Dr. Arevalo's office.

## 2023-01-27 ENCOUNTER — OFFICE VISIT (OUTPATIENT)
Dept: HEMATOLOGY/ONCOLOGY | Facility: CLINIC | Age: 73
End: 2023-01-27
Payer: MEDICARE

## 2023-01-27 ENCOUNTER — INFUSION (OUTPATIENT)
Dept: INFUSION THERAPY | Facility: HOSPITAL | Age: 73
End: 2023-01-27
Attending: INTERNAL MEDICINE
Payer: MEDICARE

## 2023-01-27 VITALS
HEIGHT: 63 IN | OXYGEN SATURATION: 95 % | BODY MASS INDEX: 25.5 KG/M2 | SYSTOLIC BLOOD PRESSURE: 126 MMHG | RESPIRATION RATE: 17 BRPM | TEMPERATURE: 98 F | HEART RATE: 59 BPM | WEIGHT: 143.94 LBS | DIASTOLIC BLOOD PRESSURE: 58 MMHG

## 2023-01-27 VITALS
DIASTOLIC BLOOD PRESSURE: 71 MMHG | HEART RATE: 55 BPM | SYSTOLIC BLOOD PRESSURE: 126 MMHG | TEMPERATURE: 98 F | RESPIRATION RATE: 16 BRPM | OXYGEN SATURATION: 96 %

## 2023-01-27 DIAGNOSIS — E78.5 HYPERLIPIDEMIA ASSOCIATED WITH TYPE 2 DIABETES MELLITUS: ICD-10-CM

## 2023-01-27 DIAGNOSIS — I69.30 HISTORY OF CVA WITH RESIDUAL DEFICIT: ICD-10-CM

## 2023-01-27 DIAGNOSIS — D63.0 ANEMIA IN NEOPLASTIC DISEASE: ICD-10-CM

## 2023-01-27 DIAGNOSIS — Z94.84 HISTORY OF AUTO STEM CELL TRANSPLANT: ICD-10-CM

## 2023-01-27 DIAGNOSIS — C90.01 MULTIPLE MYELOMA IN REMISSION: Primary | ICD-10-CM

## 2023-01-27 DIAGNOSIS — E11.69 HYPERLIPIDEMIA ASSOCIATED WITH TYPE 2 DIABETES MELLITUS: ICD-10-CM

## 2023-01-27 DIAGNOSIS — C90.02 MULTIPLE MYELOMA IN RELAPSE: ICD-10-CM

## 2023-01-27 DIAGNOSIS — C80.1 NEUROPATHY ASSOCIATED WITH CANCER: ICD-10-CM

## 2023-01-27 DIAGNOSIS — G63 NEUROPATHY ASSOCIATED WITH CANCER: ICD-10-CM

## 2023-01-27 DIAGNOSIS — Z94.81 STATUS POST AUTOLOGOUS BONE MARROW TRANSPLANT: ICD-10-CM

## 2023-01-27 DIAGNOSIS — D84.9 IMMUNOCOMPROMISED: ICD-10-CM

## 2023-01-27 DIAGNOSIS — C90.00 MULTIPLE MYELOMA, REMISSION STATUS UNSPECIFIED: Primary | ICD-10-CM

## 2023-01-27 DIAGNOSIS — E11.9 TYPE 2 DIABETES MELLITUS WITHOUT COMPLICATION, WITHOUT LONG-TERM CURRENT USE OF INSULIN: ICD-10-CM

## 2023-01-27 DIAGNOSIS — I10 ESSENTIAL HYPERTENSION: ICD-10-CM

## 2023-01-27 DIAGNOSIS — E78.00 PURE HYPERCHOLESTEROLEMIA: ICD-10-CM

## 2023-01-27 PROCEDURE — 99215 PR OFFICE/OUTPT VISIT, EST, LEVL V, 40-54 MIN: ICD-10-PCS | Mod: HCNC,S$GLB,, | Performed by: NURSE PRACTITIONER

## 2023-01-27 PROCEDURE — 3044F PR MOST RECENT HEMOGLOBIN A1C LEVEL <7.0%: ICD-10-PCS | Mod: HCNC,CPTII,S$GLB, | Performed by: NURSE PRACTITIONER

## 2023-01-27 PROCEDURE — 25000003 PHARM REV CODE 250: Mod: HCNC | Performed by: NURSE PRACTITIONER

## 2023-01-27 PROCEDURE — 1126F AMNT PAIN NOTED NONE PRSNT: CPT | Mod: HCNC,CPTII,S$GLB, | Performed by: NURSE PRACTITIONER

## 2023-01-27 PROCEDURE — 99999 PR PBB SHADOW E&M-EST. PATIENT-LVL III: CPT | Mod: PBBFAC,HCNC,, | Performed by: NURSE PRACTITIONER

## 2023-01-27 PROCEDURE — 99215 OFFICE O/P EST HI 40 MIN: CPT | Mod: HCNC,S$GLB,, | Performed by: NURSE PRACTITIONER

## 2023-01-27 PROCEDURE — 1159F PR MEDICATION LIST DOCUMENTED IN MEDICAL RECORD: ICD-10-PCS | Mod: HCNC,CPTII,S$GLB, | Performed by: NURSE PRACTITIONER

## 2023-01-27 PROCEDURE — 96401 CHEMO ANTI-NEOPL SQ/IM: CPT | Mod: HCNC

## 2023-01-27 PROCEDURE — 3288F FALL RISK ASSESSMENT DOCD: CPT | Mod: HCNC,CPTII,S$GLB, | Performed by: NURSE PRACTITIONER

## 2023-01-27 PROCEDURE — 1126F PR PAIN SEVERITY QUANTIFIED, NO PAIN PRESENT: ICD-10-PCS | Mod: HCNC,CPTII,S$GLB, | Performed by: NURSE PRACTITIONER

## 2023-01-27 PROCEDURE — 63600175 PHARM REV CODE 636 W HCPCS: Mod: HCNC | Performed by: NURSE PRACTITIONER

## 2023-01-27 PROCEDURE — 3074F PR MOST RECENT SYSTOLIC BLOOD PRESSURE < 130 MM HG: ICD-10-PCS | Mod: HCNC,CPTII,S$GLB, | Performed by: NURSE PRACTITIONER

## 2023-01-27 PROCEDURE — 99999 PR PBB SHADOW E&M-EST. PATIENT-LVL III: ICD-10-PCS | Mod: PBBFAC,HCNC,, | Performed by: NURSE PRACTITIONER

## 2023-01-27 PROCEDURE — 1101F PT FALLS ASSESS-DOCD LE1/YR: CPT | Mod: HCNC,CPTII,S$GLB, | Performed by: NURSE PRACTITIONER

## 2023-01-27 PROCEDURE — 1101F PR PT FALLS ASSESS DOC 0-1 FALLS W/OUT INJ PAST YR: ICD-10-PCS | Mod: HCNC,CPTII,S$GLB, | Performed by: NURSE PRACTITIONER

## 2023-01-27 PROCEDURE — 96402 CHEMO HORMON ANTINEOPL SQ/IM: CPT | Mod: HCNC

## 2023-01-27 PROCEDURE — 3008F PR BODY MASS INDEX (BMI) DOCUMENTED: ICD-10-PCS | Mod: HCNC,CPTII,S$GLB, | Performed by: NURSE PRACTITIONER

## 2023-01-27 PROCEDURE — 3078F DIAST BP <80 MM HG: CPT | Mod: HCNC,CPTII,S$GLB, | Performed by: NURSE PRACTITIONER

## 2023-01-27 PROCEDURE — 1160F PR REVIEW ALL MEDS BY PRESCRIBER/CLIN PHARMACIST DOCUMENTED: ICD-10-PCS | Mod: HCNC,CPTII,S$GLB, | Performed by: NURSE PRACTITIONER

## 2023-01-27 PROCEDURE — 3008F BODY MASS INDEX DOCD: CPT | Mod: HCNC,CPTII,S$GLB, | Performed by: NURSE PRACTITIONER

## 2023-01-27 PROCEDURE — 1160F RVW MEDS BY RX/DR IN RCRD: CPT | Mod: HCNC,CPTII,S$GLB, | Performed by: NURSE PRACTITIONER

## 2023-01-27 PROCEDURE — 3288F PR FALLS RISK ASSESSMENT DOCUMENTED: ICD-10-PCS | Mod: HCNC,CPTII,S$GLB, | Performed by: NURSE PRACTITIONER

## 2023-01-27 PROCEDURE — 3044F HG A1C LEVEL LT 7.0%: CPT | Mod: HCNC,CPTII,S$GLB, | Performed by: NURSE PRACTITIONER

## 2023-01-27 PROCEDURE — 3074F SYST BP LT 130 MM HG: CPT | Mod: HCNC,CPTII,S$GLB, | Performed by: NURSE PRACTITIONER

## 2023-01-27 PROCEDURE — 96365 THER/PROPH/DIAG IV INF INIT: CPT | Mod: HCNC

## 2023-01-27 PROCEDURE — 63600175 PHARM REV CODE 636 W HCPCS: Mod: TB,HCNC

## 2023-01-27 PROCEDURE — 3078F PR MOST RECENT DIASTOLIC BLOOD PRESSURE < 80 MM HG: ICD-10-PCS | Mod: HCNC,CPTII,S$GLB, | Performed by: NURSE PRACTITIONER

## 2023-01-27 PROCEDURE — 1159F MED LIST DOCD IN RCRD: CPT | Mod: HCNC,CPTII,S$GLB, | Performed by: NURSE PRACTITIONER

## 2023-01-27 RX ORDER — DIPHENHYDRAMINE HYDROCHLORIDE 50 MG/ML
50 INJECTION INTRAMUSCULAR; INTRAVENOUS ONCE AS NEEDED
Status: CANCELLED | OUTPATIENT
Start: 2023-01-27 | End: 2034-06-24

## 2023-01-27 RX ORDER — HEPARIN 100 UNIT/ML
500 SYRINGE INTRAVENOUS
Status: DISCONTINUED | OUTPATIENT
Start: 2023-01-27 | End: 2023-01-27 | Stop reason: HOSPADM

## 2023-01-27 RX ORDER — EPINEPHRINE 0.3 MG/.3ML
0.3 INJECTION SUBCUTANEOUS ONCE AS NEEDED
Status: CANCELLED | OUTPATIENT
Start: 2023-01-27 | End: 2034-06-24

## 2023-01-27 RX ORDER — ZOLEDRONIC ACID 0.04 MG/ML
4 INJECTION, SOLUTION INTRAVENOUS
Status: CANCELLED | OUTPATIENT
Start: 2023-01-27

## 2023-01-27 RX ORDER — SODIUM CHLORIDE 0.9 % (FLUSH) 0.9 %
10 SYRINGE (ML) INJECTION
Status: CANCELLED | OUTPATIENT
Start: 2023-01-27

## 2023-01-27 RX ORDER — EPINEPHRINE 0.3 MG/.3ML
0.3 INJECTION SUBCUTANEOUS ONCE AS NEEDED
Status: DISCONTINUED | OUTPATIENT
Start: 2023-01-27 | End: 2023-01-27 | Stop reason: HOSPADM

## 2023-01-27 RX ORDER — DIPHENHYDRAMINE HYDROCHLORIDE 50 MG/ML
50 INJECTION INTRAMUSCULAR; INTRAVENOUS ONCE AS NEEDED
Status: DISCONTINUED | OUTPATIENT
Start: 2023-01-27 | End: 2023-01-27 | Stop reason: HOSPADM

## 2023-01-27 RX ORDER — SODIUM CHLORIDE 0.9 % (FLUSH) 0.9 %
10 SYRINGE (ML) INJECTION
Status: DISCONTINUED | OUTPATIENT
Start: 2023-01-27 | End: 2023-01-27 | Stop reason: HOSPADM

## 2023-01-27 RX ORDER — HEPARIN 100 UNIT/ML
500 SYRINGE INTRAVENOUS
Status: CANCELLED | OUTPATIENT
Start: 2023-01-27

## 2023-01-27 RX ADMIN — ZOLEDRONIC ACID 3.5 MG: 4 INJECTION, SOLUTION, CONCENTRATE INTRAVENOUS at 09:01

## 2023-01-27 RX ADMIN — SODIUM CHLORIDE: 9 INJECTION, SOLUTION INTRAVENOUS at 09:01

## 2023-01-27 NOTE — PROGRESS NOTES
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  She achieved and tolerated well VRd x completing 7, 28 day cycles and achieving deep VGPR to induction. Then underwent Melphalan autologous stem cell transplant on 2/12/2020.      Extensive PMH as below.    2 prior CVAs (one in 2008, one in 2011)  L breast cancer DCIS stage 0 (s/p lumpectomy and radiation, no endocrine tx due to CVA)  HTN  DM II  Neuropathy, b/l hands  Prior smoker, quit in 2011  Hepatic lesions - vascular per recent MRI in 09 /2019 with no changes; will confirm no further rascon needed from help  Statin Intolerance - on praluent, PCSK9 inhibitor  HFpEF - EF 55%, diastolic dysfunction  Anxiety/Depression     ADMISSION SUMMARY: 2/10-2/26/2020  Admitted 2/10, tolerated chemo and transplant well. Engrafted on day +12. Remained afebrile throughout hospital stay and no mucositis. Experienced expected side effects of nausea and diarrhea controlled with antiemetics and Imodium. Discharged home with home PT/OT       Subjective:       Initial History: Ms. Thompson is a 72 y.o. female who presents for virtual follow up.  She is 2 year 11  month post AutoSCT.  Relaped 1 year post sct and was on Sravanthi/zhanna/dex until summer 2022 when changed to sravanthi/pom/dex due to biohchemical only progression. Remains on sravanthi/pom/dex salvage. Tolerating dose adjusted pomalyst. Less appetite, weight loss present. Occasional hallucination and stable PN present. No other active complaints. Presents with her friend.    Past Medical History:   Past Medical History:   Diagnosis Date    Acute respiratory failure with hypoxia  4/30/2019    FUENTES (acute kidney injury) 5/1/2019    Allergy     Anemia     Aortic aneurysm     Breast cancer 10/2011    left breast Stage 0 DCIS    Cataract     Chronic diastolic congestive heart failure 11/6/2015    Colon polyp     Community acquired pneumonia of right lower lobe of lung 8/3/2021    GERD (gastroesophageal reflux disease)     Hiatal hernia     History of colonic polyps     Hx of psychiatric care     Zoloft    HX: breast cancer     Hyperlipemia     Hypertension     ICH (intracerebral hemorrhage)     Immunocompromised 10/28/2022    Major depressive disorder, single episode, mild 6/23/2016    Nuclear sclerosis 7/21/2014    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 7/21/2014    PAD (peripheral artery disease) 11/6/2015    Psychiatric problem     Statin-induced rhabdomyolysis     4/2015     Stroke 4/2011    Type 2 diabetes mellitus with diabetic peripheral angiopathy without gangrene, with long-term current use of insulin 3/31/2021    Type 2 diabetes mellitus without complication, without long-term current use of insulin 2/10/2020    Walker as ambulation aid        Past Surgical HIstory:   Past Surgical History:   Procedure Laterality Date    APPENDECTOMY      BONE MARROW BIOPSY Left 10/3/2019    Procedure: Biopsy-bone marrow;  Surgeon: Jere Tineo MD;  Location: [facility] OR [address] [place] [address];  Service: Oncology;  Laterality: Left;    BREAST BIOPSY Left 10/2011    BREAST LUMPECTOMY Left 2011    DCIS    COLONOSCOPY      COLONOSCOPY N/A 1/9/2020    Procedure: COLONOSCOPY;  Surgeon: Quang De La Cruz MD;  Location: [facility] (4TH FLR);  Service: Endoscopy;  Laterality: N/A;    CYST REMOVAL      back    ESOPHAGOGASTRODUODENOSCOPY  07/2016    duodenal angioectasia    ESOPHAGOGASTRODUODENOSCOPY N/A 1/9/2020    Procedure: EGD (ESOPHAGOGASTRODUODENOSCOPY);  Surgeon: Quang De La Cruz MD;  Location: [facility] (4TH FLR);  Service: Endoscopy;  Laterality: N/A;    FOOT FRACTURE SURGERY  10/2012     right foot, with R hallux valgus repair    FRACTURE SURGERY Right     broken toe repiar    HEMORRHOID SURGERY      LIVER BIOPSY  04/2015    essentially normal, no fibrosis    TUBAL LIGATION      UPPER GASTROINTESTINAL ENDOSCOPY         Family History:   Family History   Problem Relation Age of Onset    Dementia Sister         x1 sister    Cancer Sister 63        Lung Cancer    No Known Problems Mother     Cancer Father     No Known Problems Brother     Hypertension Daughter     Fibroids Daughter         uterine    Hypertension Son     No Known Problems Brother     No Known Problems Maternal Aunt     No Known Problems Maternal Uncle     No Known Problems Paternal Aunt     No Known Problems Paternal Uncle     Hypertension Maternal Grandmother     No Known Problems Maternal Grandfather     No Known Problems Paternal Grandmother     No Known Problems Paternal Grandfather     Ovarian cancer Neg Hx     Colon cancer Neg Hx     Tremor Neg Hx     Amblyopia Neg Hx     Blindness Neg Hx     Cataracts Neg Hx     Diabetes Neg Hx     Glaucoma Neg Hx     Macular degeneration Neg Hx     Retinal detachment Neg Hx     Strabismus Neg Hx     Stroke Neg Hx     Thyroid disease Neg Hx     Esophageal cancer Neg Hx     Rectal cancer Neg Hx     Stomach cancer Neg Hx     Ulcerative colitis Neg Hx     Crohn's disease Neg Hx     Irritable bowel syndrome Neg Hx     Celiac disease Neg Hx        Social History:  reports that she quit smoking about 11 years ago. Her smoking use included cigarettes. She started smoking about 56 years ago. She has a 11.00 pack-year smoking history. She has never used smokeless tobacco. She reports that she does not currently use alcohol. She reports that she does not use drugs.    Allergies:  Review of patient's allergies indicates:   Allergen Reactions    Lipitor [atorvastatin] Itching     Itching, elevated cpk, muscle aches, statin induced myositis       Medications:  Current Outpatient Medications   Medication  Sig Dispense Refill    acyclovir (ZOVIRAX) 400 MG tablet Take 1 tablet (400 mg total) by mouth 2 (two) times daily. 60 tablet 2    albuterol (ACCUNEB) 1.25 mg/3 mL Nebu Take 3 mLs (1.25 mg total) by nebulization every 6 (six) hours as needed (wheeze/cough). Rescue 75 mL 2    alirocumab (PRALUENT PEN) 75 mg/mL PnIj Inject 1 mL (75 mg total) into the skin every 14 (fourteen) days. 6 mL 3    amLODIPine (NORVASC) 5 MG tablet TAKE 1 TABLET BY MOUTH EVERY DAY 90 tablet 2    aspirin (ECOTRIN) 81 MG EC tablet TAKE 1 TABLET BY MOUTH EVERY DAY 90 tablet 3    cholecalciferol, vitamin D3, 125 mcg (5,000 unit) Tab 1 tablet DAILY (route: oral)      gabapentin (NEURONTIN) 300 MG capsule Take 1 capsule (300 mg total) by mouth 2 (two) times daily. 180 capsule 2    GADAVIST 1 mmol/mL (604.72 mg/mL) Soln injection       metFORMIN (GLUCOPHAGE) 1000 MG tablet Take 1 tablet (1,000 mg total) by mouth 2 (two) times daily with meals. 180 tablet 0    omeprazole (PRILOSEC) 40 MG capsule TAKE 1 CAPSULE BY MOUTH EVERY DAY 90 capsule 3    OXYGEN-AIR DELIVERY SYSTEMS MISC 1-2 Liter O2 - CONTINUOUS (route: Oxygen)      pomalidomide 3 mg Cap TAKE 1 CAPSULE BY MOUTH ONCE DAILY ON DAYS 1-21 OF 28 DAY CYCLE.. 21 capsule 0    potassium chloride SA (K-DUR,KLOR-CON) 20 MEQ tablet       valsartan (DIOVAN) 160 MG tablet Take 1 tablet (160 mg total) by mouth once daily. 90 tablet 3    zoledronic 4 mg/100 mL PgBk infusion       zoledronic acid (ZOMETA) 4 mg/5 mL injection       DARZALEX FASPRO 1,800 mg-30,000 unit/15 mL Soln       ENGERIX-B, PF, 20 mcg/mL Syrg       ferrous gluconate 324 mg (37.5 mg iron) Tab tablet 1 tablet DAILY (route: oral)      sertraline (ZOLOFT) 50 MG tablet Take 1 tablet (50 mg total) by mouth once daily. (Patient not taking: Reported on 1/27/2023) 90 tablet 3     No current facility-administered medications for this visit.     Facility-Administered Medications Ordered in Other Visits   Medication Dose Route Frequency Provider Last  Rate Last Admin    alteplase injection 2 mg  2 mg Intra-Catheter PRN Adina Wright NP        diphenhydrAMINE injection 50 mg  50 mg Intravenous Once PRN Adina Wright NP        EPINEPHrine (EPIPEN) 0.3 mg/0.3 mL pen injection 0.3 mg  0.3 mg Intramuscular Once PRN Adina Wright NP        heparin, porcine (PF) 100 unit/mL injection flush 500 Units  500 Units Intravenous PRN Adina Wright NP        hydrocortisone sodium succinate injection 100 mg  100 mg Intravenous Once PRN Adina Wright NP        sodium chloride 0.9% flush 10 mL  10 mL Intravenous PRN Adina Wright NP          See HPI     ECOG Performance Status: 2 (due to remote  CVA)   Objective:      Vitals:   Vitals:    01/27/23 0821   BP: (!) 126/58   Pulse: (!) 59   Resp: 17   Temp: 98.2 °F (36.8 °C)   Physical Exam  Constitutional:       Appearance: She is well-developed.   HENT:      Head: Normocephalic and atraumatic.      Mouth/Throat:      Mouth: Mucous membranes are moist. No oral lesions.      Pharynx: Oropharynx is clear.   Eyes:      Conjunctiva/sclera: Conjunctivae normal.   Cardiovascular:      Rate and Rhythm: Normal rate and regular rhythm.      Heart sounds: Normal heart sounds.   Pulmonary:      Effort: No respiratory distress.      Breath sounds: Normal breath sounds.   Abdominal:      General: Bowel sounds are normal.      Palpations: Abdomen is soft.   Musculoskeletal:         General: Normal range of motion.      Cervical back: Normal range of motion.      Comments: Unsteady gait   Skin:     General: Skin is warm and dry.   Neurological:      Mental Status: She is alert and oriented to person, place, and time.   Psychiatric:         Behavior: Behavior normal.      Comments: Occasional hallucination          Laboratory Data:  Lab Visit on 01/27/2023   Component Date Value Ref Range Status    WBC 01/27/2023 3.98  3.90 - 12.70 K/uL Final    RBC 01/27/2023 3.85 (L)  4.00 - 5.40 M/uL Final    Hemoglobin 01/27/2023 7.8 (L)  12.0 - 16.0  g/dL Final    Hematocrit 01/27/2023 27.6 (L)  37.0 - 48.5 % Final    MCV 01/27/2023 72 (L)  82 - 98 fL Final    MCH 01/27/2023 20.3 (L)  27.0 - 31.0 pg Final    MCHC 01/27/2023 28.3 (L)  32.0 - 36.0 g/dL Final    RDW 01/27/2023 23.3 (H)  11.5 - 14.5 % Final    Platelets 01/27/2023 205  150 - 450 K/uL Final    MPV 01/27/2023 SEE COMMENT  9.2 - 12.9 fL Final    Result not available.    Immature Granulocytes 01/27/2023 0.5  0.0 - 0.5 % Final    Gran # (ANC) 01/27/2023 2.0  1.8 - 7.7 K/uL Final    Immature Grans (Abs) 01/27/2023 0.02  0.00 - 0.04 K/uL Final    Comment: Mild elevation in immature granulocytes is non specific and   can be seen in a variety of conditions including stress response,   acute inflammation, trauma and pregnancy. Correlation with other   laboratory and clinical findings is essential.      Lymph # 01/27/2023 1.2  1.0 - 4.8 K/uL Final    Mono # 01/27/2023 0.4  0.3 - 1.0 K/uL Final    Eos # 01/27/2023 0.2  0.0 - 0.5 K/uL Final    Baso # 01/27/2023 0.10  0.00 - 0.20 K/uL Final    nRBC 01/27/2023 0  0 /100 WBC Final    Gran % 01/27/2023 51.3  38.0 - 73.0 % Final    Lymph % 01/27/2023 30.4  18.0 - 48.0 % Final    Mono % 01/27/2023 9.8  4.0 - 15.0 % Final    Eosinophil % 01/27/2023 5.5  0.0 - 8.0 % Final    Basophil % 01/27/2023 2.5 (H)  0.0 - 1.9 % Final    Differential Method 01/27/2023 Automated   Final    Sodium 01/27/2023 140  136 - 145 mmol/L Final    Potassium 01/27/2023 4.3  3.5 - 5.1 mmol/L Final    Chloride 01/27/2023 105  95 - 110 mmol/L Final    CO2 01/27/2023 25  23 - 29 mmol/L Final    Glucose 01/27/2023 84  70 - 110 mg/dL Final    BUN 01/27/2023 10  8 - 23 mg/dL Final    Creatinine 01/27/2023 0.9  0.5 - 1.4 mg/dL Final    Calcium 01/27/2023 9.1  8.7 - 10.5 mg/dL Final    Total Protein 01/27/2023 7.6  6.0 - 8.4 g/dL Final    Albumin 01/27/2023 3.7  3.5 - 5.2 g/dL Final    Total Bilirubin 01/27/2023 0.7  0.1 - 1.0 mg/dL Final    Comment: For infants and newborns, interpretation of results  should be based  on gestational age, weight and in agreement with clinical  observations.    Premature Infant recommended reference ranges:  Up to 24 hours.............<8.0 mg/dL  Up to 48 hours............<12.0 mg/dL  3-5 days..................<15.0 mg/dL  6-29 days.................<15.0 mg/dL      Alkaline Phosphatase 01/27/2023 51 (L)  55 - 135 U/L Final    AST 01/27/2023 14  10 - 40 U/L Final    ALT 01/27/2023 12  10 - 44 U/L Final    Anion Gap 01/27/2023 10  8 - 16 mmol/L Final    eGFR 01/27/2023 >60.0  >60 mL/min/1.73 m^2 Final    Protein, Serum 01/27/2023 7.1  6.0 - 8.4 g/dL Final    Comment: Serum protein electrophoresis and immunofixation results should be   interpreted in clinical context in that some therapeutic agents can   result   in false positive results (example, daratumumab). Correlation with   the   patient s therapeutic regimen is required.      IgG 01/27/2023 1258  650 - 1600 mg/dL Final    IgG Cord Blood Reference Range: 650-1600 mg/dL.    IgA 01/27/2023 146  40 - 350 mg/dL Final    IgA Cord Blood Reference Range: <5 mg/dL.    IgM 01/27/2023 60  50 - 300 mg/dL Final    IgM Cord Blood Reference Range: <25 mg/dL.      Lab Results   Component Value Date    WBC 3.98 01/27/2023    HGB 7.8 (L) 01/27/2023    HCT 27.6 (L) 01/27/2023    MCV 72 (L) 01/27/2023     01/27/2023       Gran # (ANC)   Date Value Ref Range Status   01/27/2023 2.0 1.8 - 7.7 K/uL Final     Gran %   Date Value Ref Range Status   01/27/2023 51.3 38.0 - 73.0 % Final     Kappa Free Light Chains   Date Value Ref Range Status   12/22/2022 11.70 (H) 0.33 - 1.94 mg/dL Final   11/23/2022 11.77 (H) 0.33 - 1.94 mg/dL Final   10/27/2022 9.93 (H) 0.33 - 1.94 mg/dL Final     Lambda Free Light Chains   Date Value Ref Range Status   12/22/2022 1.43 0.57 - 2.63 mg/dL Final   11/23/2022 2.18 0.57 - 2.63 mg/dL Final   10/27/2022 2.89 (H) 0.57 - 2.63 mg/dL Final     Kappa/Lambda FLC Ratio   Date Value Ref Range Status   12/22/2022 8.18 (H)  0.26 - 1.65 Final     Comment:     Undetected antigen excess is a rare event but cannot   be excluded. If these free light chain results do not   agree with other clinical or laboratory findings or   if the sample is from a patient that has previously   demonstrated antigen excess, discuss with the testing   laboratory.   Results should always be interpreted in conjunction   with other laboratory tests and clinical evidence.     11/23/2022 5.40 (H) 0.26 - 1.65 Final     Comment:     Undetected antigen excess is a rare event but cannot   be excluded. If these free light chain results do not   agree with other clinical or laboratory findings or   if the sample is from a patient that has previously   demonstrated antigen excess, discuss with the testing   laboratory.   Results should always be interpreted in conjunction   with other laboratory tests and clinical evidence.     10/27/2022 3.44 (H) 0.26 - 1.65 Final     Comment:     Undetected antigen excess is a rare event but cannot   be excluded. If these free light chain results do not   agree with other clinical or laboratory findings or   if the sample is from a patient that has previously   demonstrated antigen excess, discuss with the testing   laboratory.   Results should always be interpreted in conjunction   with other laboratory tests and clinical evidence.       IgG   Date Value Ref Range Status   01/27/2023 1258 650 - 1600 mg/dL Final     Comment:     IgG Cord Blood Reference Range: 650-1600 mg/dL.     IgA   Date Value Ref Range Status   01/27/2023 146 40 - 350 mg/dL Final     Comment:     IgA Cord Blood Reference Range: <5 mg/dL.     IgM   Date Value Ref Range Status   01/27/2023 60 50 - 300 mg/dL Final     Comment:     IgM Cord Blood Reference Range: <25 mg/dL.        Imaging:      Assessment:       Ms. Thompson is a  72 y.o. femal with relapsed multiple myeloma.     Plan:     MM s/p Auto SCT  - high risk MM with 17p deletion  - 2 year 11 months  s/p Auto  "SCT  - started maintenance q2w velcade 5/6/20   - serial biochemical relapse  Noted and  given this and high risk CG , transitioned to Sravanthi/Noble (1mg/m2) /dex  Salvage July 2021    - Due to uncontrolled hyperglycemia dex stopped after C4.    -supportive meds:   continue ppx acyclovir, asa; resume maintenance zometa q 3 months for 1 year; last dose on 08/04/22, next dose today.  -she had demonstrated mild biochemical progression over summer 2022 studies with no CRAB, in light of this and high risk CG   we transitioned her therapy from Sravanthi/Velcade to Sravanthi/pomalyst; intolerant to dex.   - biochemical studies from 9/29/22 improved so plan to continue until intolerant or progression. Pomalyst 4 mg dose adjusted to 3 mg due to worsening cytopenia(12/22/23).  Cytopenia improved, patient tolerating dose adjusted pomalyst.  -already on asa 81mg   -post transplant vaccines completed   - Recent MM studies on 12/22/23, remains stable. Today's pending    Anemia due to chemotherapy  -due to therapy, continue to monitor    - Continue to monitor  - Pom dose adjusted as above  - Hg - 7.8, will repeat cbc in 2 weeks     Neuropathy  -Stable   - continues gabapentin and prn tramadol     SOB/descending aortic aneurysm  - CTA and dopplers ordered urgently with high concern for DVT/PE; completed 8/3/20  - incidental descending thoracic aortic aneurysm noted; referred to thoracic surgery; seen 8/7/20; per note: "at current size would not recommend any intervention as risk of rupture remains low.  Recommend surveillance CT chest every 12 months to monitor growth of the aneurysm.  Would typically recommend aspirin 81 mg daily in patients with aortic aneurysm  - ASA started (9/14/20)     HTN  - continue norvasc  - Patient to monitor BP closely    Anxiety/depression/hallucinations/tremoc  - continue zoloft  -recommended she make appt with established neurologist to discuss mild occasional self resolving hallucinations. f/u scheduled on 03/27. " No early availability, so patient to keep the schedule.   -continue fu with neurology for above    DM2  - diet controlled    Hx of CVA  - s/p 2008, 2011    HLD with Statin Intolerance  -on praluent, PCSK9 inhibitor    L breast cancer DCIS stage 0  -s/p lumpectomy and radiation, no endocrine tx due to CVA       BMT Chart Routing      Follow up with physician    Follow up with CHRISTY . Keep scheduled visits   Provider visit type    Infusion scheduling note    Injection scheduling note    Labs   Lab interval:  Keep scheduled lab, visit, chemo   Imaging    Pharmacy appointment    Other referrals      Advance Care Planning     Date: 01/27/2023       We previously had discussions regarding her underlying diagnosis, natural history, prognosis, and treatment options.  She was interested in pursuing any and all treatment options available to him, including chemo treatment.  She remains interested in pursuing all treatment needed.  Will continue chemo therapy monitoring.  Total time of this visit was 30 minutes, including time spent face to face with patient and/or via video/audio, and also in preparing for today's visit for MDM and documentation. (Medical Decision Making, including consideration of possible diagnoses, management options, complex medical record review, review of diagnostic tests and information, consideration and discussion of significant complications based on comorbidities, and discussion with providers involved with the care of the patient). Greater than 50% was spent face to face with the patient counseling and coordinating care.            Adina Wright NP  Hematology/Oncology/BMT

## 2023-01-27 NOTE — PLAN OF CARE
1040 Patient tolerated C19 Sravanthi SQ and o8ucnoc Zometa without incident. Vitals stable.  PIV flushed without resistance and positive for blood return before, during and after infusion. Patient does not take dex due to intolerance. Creatinine clearance at 50.6, dose adjusted per parameters. Patient encouraged to hydrate. Denies any jaw/mouth pain or upcoming/recent dental procedures. Verbalizes she takes vitamin D and will start taking daily calcium. Patient aware of her next appointment date/time, uses [facility]. To contact provider with questions or concerns. D/C ambulatory and stable with her friend.

## 2023-01-30 ENCOUNTER — SPECIALTY PHARMACY (OUTPATIENT)
Dept: PHARMACY | Facility: CLINIC | Age: 73
End: 2023-01-30
Payer: MEDICARE

## 2023-01-30 NOTE — TELEPHONE ENCOUNTER
Outgoing call regarding Praluent refill. Charge rejection not on formulary, suggesting Repatha instead. Routing to [facility] for PA and follow up.

## 2023-02-01 NOTE — TELEPHONE ENCOUNTER
Praluent PA submitted and denied. Repatha is preferred. Staff message sent to the provider to request a prescription for Repatha. Pt states that she is okay with switching in order have insurance coverage.

## 2023-02-02 ENCOUNTER — HOSPITAL ENCOUNTER (OUTPATIENT)
Dept: RADIOLOGY | Facility: HOSPITAL | Age: 73
Discharge: HOME | End: 2023-02-02
Attending: INTERNAL MEDICINE
Payer: MEDICARE

## 2023-02-02 ENCOUNTER — TELEPHONE (OUTPATIENT)
Dept: NEUROLOGY | Facility: CLINIC | Age: 73
End: 2023-02-02
Payer: MEDICARE

## 2023-02-02 ENCOUNTER — OUTPATIENT CASE MANAGEMENT (OUTPATIENT)
Dept: NEUROLOGY | Facility: CLINIC | Age: 73
End: 2023-02-02
Payer: MEDICARE

## 2023-02-02 DIAGNOSIS — R41.89 COGNITIVE AND BEHAVIORAL CHANGES: Primary | ICD-10-CM

## 2023-02-02 DIAGNOSIS — Z12.31 VISIT FOR SCREENING MAMMOGRAM: ICD-10-CM

## 2023-02-02 DIAGNOSIS — R46.89 COGNITIVE AND BEHAVIORAL CHANGES: Primary | ICD-10-CM

## 2023-02-02 PROCEDURE — 77067 SCR MAMMO BI INCL CAD: CPT | Mod: TC,HCNC

## 2023-02-02 PROCEDURE — 77067 MAMMO DIGITAL SCREENING BILAT WITH TOMO: ICD-10-PCS | Mod: 26,HCNC,, | Performed by: RADIOLOGY

## 2023-02-02 PROCEDURE — 77063 BREAST TOMOSYNTHESIS BI: CPT | Mod: 26,HCNC,, | Performed by: RADIOLOGY

## 2023-02-02 PROCEDURE — 77063 MAMMO DIGITAL SCREENING BILAT WITH TOMO: ICD-10-PCS | Mod: 26,HCNC,, | Performed by: RADIOLOGY

## 2023-02-02 PROCEDURE — 77067 SCR MAMMO BI INCL CAD: CPT | Mod: 26,HCNC,, | Performed by: RADIOLOGY

## 2023-02-02 NOTE — TELEPHONE ENCOUNTER
Pt' appt incorrectly booked as EP in too short a time slot. Called and spoke with the patient and her spouse. Offered next available appointment, tomorrow at 1030. Pt's  verbalized understanding and repeated back date/time/location of scheduled appointment.   Affirmed confirmation will be listed in MyChart momentarily

## 2023-02-03 ENCOUNTER — OFFICE VISIT (OUTPATIENT)
Dept: NEUROLOGY | Facility: CLINIC | Age: 73
End: 2023-02-03
Payer: MEDICARE

## 2023-02-03 VITALS
DIASTOLIC BLOOD PRESSURE: 78 MMHG | HEIGHT: 63 IN | HEART RATE: 69 BPM | WEIGHT: 145 LBS | BODY MASS INDEX: 25.69 KG/M2 | SYSTOLIC BLOOD PRESSURE: 122 MMHG

## 2023-02-03 DIAGNOSIS — F01.A18 MILD VASCULAR DEMENTIA WITH OTHER BEHAVIORAL DISTURBANCE: Primary | ICD-10-CM

## 2023-02-03 DIAGNOSIS — G47.33 OSA (OBSTRUCTIVE SLEEP APNEA): ICD-10-CM

## 2023-02-03 DIAGNOSIS — I69.319 COGNITIVE DEFICIT AS LATE EFFECT OF CEREBROVASCULAR ACCIDENT (CVA): ICD-10-CM

## 2023-02-03 DIAGNOSIS — Q28.2 AVM (ARTERIOVENOUS MALFORMATION) BRAIN: ICD-10-CM

## 2023-02-03 DIAGNOSIS — E46 PROTEIN-CALORIE MALNUTRITION, UNSPECIFIED SEVERITY: ICD-10-CM

## 2023-02-03 DIAGNOSIS — R26.81 GAIT INSTABILITY: ICD-10-CM

## 2023-02-03 DIAGNOSIS — G25.5 CHOREA: ICD-10-CM

## 2023-02-03 DIAGNOSIS — I67.2 ARTERIOSCLEROTIC CEREBROVASCULAR DISEASE: ICD-10-CM

## 2023-02-03 DIAGNOSIS — C90.00 MULTIPLE MYELOMA NOT HAVING ACHIEVED REMISSION: ICD-10-CM

## 2023-02-03 DIAGNOSIS — I61.0 NONTRAUMATIC SUBCORTICAL HEMORRHAGE OF LEFT CEREBRAL HEMISPHERE: ICD-10-CM

## 2023-02-03 DIAGNOSIS — R44.3 HALLUCINATION: ICD-10-CM

## 2023-02-03 PROCEDURE — 3074F PR MOST RECENT SYSTOLIC BLOOD PRESSURE < 130 MM HG: ICD-10-PCS | Mod: HCNC,CPTII,S$GLB, | Performed by: PSYCHIATRY & NEUROLOGY

## 2023-02-03 PROCEDURE — 99215 PR OFFICE/OUTPT VISIT, EST, LEVL V, 40-54 MIN: ICD-10-PCS | Mod: HCNC,S$GLB,, | Performed by: PSYCHIATRY & NEUROLOGY

## 2023-02-03 PROCEDURE — 1101F PT FALLS ASSESS-DOCD LE1/YR: CPT | Mod: HCNC,CPTII,S$GLB, | Performed by: PSYCHIATRY & NEUROLOGY

## 2023-02-03 PROCEDURE — 96116 NUBHVL XM PHYS/QHP 1ST HR: CPT | Mod: 59,HCNC,S$GLB, | Performed by: PSYCHIATRY & NEUROLOGY

## 2023-02-03 PROCEDURE — 99215 OFFICE O/P EST HI 40 MIN: CPT | Mod: HCNC,S$GLB,, | Performed by: PSYCHIATRY & NEUROLOGY

## 2023-02-03 PROCEDURE — 3288F PR FALLS RISK ASSESSMENT DOCUMENTED: ICD-10-PCS | Mod: HCNC,CPTII,S$GLB, | Performed by: PSYCHIATRY & NEUROLOGY

## 2023-02-03 PROCEDURE — 3074F SYST BP LT 130 MM HG: CPT | Mod: HCNC,CPTII,S$GLB, | Performed by: PSYCHIATRY & NEUROLOGY

## 2023-02-03 PROCEDURE — 3044F HG A1C LEVEL LT 7.0%: CPT | Mod: HCNC,CPTII,S$GLB, | Performed by: PSYCHIATRY & NEUROLOGY

## 2023-02-03 PROCEDURE — 99999 PR PBB SHADOW E&M-EST. PATIENT-LVL V: CPT | Mod: PBBFAC,HCNC,, | Performed by: PSYCHIATRY & NEUROLOGY

## 2023-02-03 PROCEDURE — 3078F PR MOST RECENT DIASTOLIC BLOOD PRESSURE < 80 MM HG: ICD-10-PCS | Mod: HCNC,CPTII,S$GLB, | Performed by: PSYCHIATRY & NEUROLOGY

## 2023-02-03 PROCEDURE — 3008F PR BODY MASS INDEX (BMI) DOCUMENTED: ICD-10-PCS | Mod: HCNC,CPTII,S$GLB, | Performed by: PSYCHIATRY & NEUROLOGY

## 2023-02-03 PROCEDURE — 1159F MED LIST DOCD IN RCRD: CPT | Mod: HCNC,CPTII,S$GLB, | Performed by: PSYCHIATRY & NEUROLOGY

## 2023-02-03 PROCEDURE — 1160F PR REVIEW ALL MEDS BY PRESCRIBER/CLIN PHARMACIST DOCUMENTED: ICD-10-PCS | Mod: HCNC,CPTII,S$GLB, | Performed by: PSYCHIATRY & NEUROLOGY

## 2023-02-03 PROCEDURE — 99499 RISK ADDL DX/OHS AUDIT: ICD-10-PCS | Mod: HCNC,S$GLB,, | Performed by: PSYCHIATRY & NEUROLOGY

## 2023-02-03 PROCEDURE — 3078F DIAST BP <80 MM HG: CPT | Mod: HCNC,CPTII,S$GLB, | Performed by: PSYCHIATRY & NEUROLOGY

## 2023-02-03 PROCEDURE — 99417 PROLNG OP E/M EACH 15 MIN: CPT | Mod: HCNC,S$GLB,, | Performed by: PSYCHIATRY & NEUROLOGY

## 2023-02-03 PROCEDURE — 3008F BODY MASS INDEX DOCD: CPT | Mod: HCNC,CPTII,S$GLB, | Performed by: PSYCHIATRY & NEUROLOGY

## 2023-02-03 PROCEDURE — 99999 PR PBB SHADOW E&M-EST. PATIENT-LVL V: ICD-10-PCS | Mod: PBBFAC,HCNC,, | Performed by: PSYCHIATRY & NEUROLOGY

## 2023-02-03 PROCEDURE — 1101F PR PT FALLS ASSESS DOC 0-1 FALLS W/OUT INJ PAST YR: ICD-10-PCS | Mod: HCNC,CPTII,S$GLB, | Performed by: PSYCHIATRY & NEUROLOGY

## 2023-02-03 PROCEDURE — 1160F RVW MEDS BY RX/DR IN RCRD: CPT | Mod: HCNC,CPTII,S$GLB, | Performed by: PSYCHIATRY & NEUROLOGY

## 2023-02-03 PROCEDURE — 99417 PR PROLONGED SVC, OUTPT, W/WO DIRECT PT CONTACT,  EA ADDTL 15 MIN: ICD-10-PCS | Mod: HCNC,S$GLB,, | Performed by: PSYCHIATRY & NEUROLOGY

## 2023-02-03 PROCEDURE — 1126F PR PAIN SEVERITY QUANTIFIED, NO PAIN PRESENT: ICD-10-PCS | Mod: HCNC,CPTII,S$GLB, | Performed by: PSYCHIATRY & NEUROLOGY

## 2023-02-03 PROCEDURE — 1126F AMNT PAIN NOTED NONE PRSNT: CPT | Mod: HCNC,CPTII,S$GLB, | Performed by: PSYCHIATRY & NEUROLOGY

## 2023-02-03 PROCEDURE — 96132 PR NEUROPSYCHOLOGIC TEST EVAL SVCS, 1ST HR: ICD-10-PCS | Mod: 59,HCNC,S$GLB, | Performed by: PSYCHIATRY & NEUROLOGY

## 2023-02-03 PROCEDURE — 3044F PR MOST RECENT HEMOGLOBIN A1C LEVEL <7.0%: ICD-10-PCS | Mod: HCNC,CPTII,S$GLB, | Performed by: PSYCHIATRY & NEUROLOGY

## 2023-02-03 PROCEDURE — 99499 UNLISTED E&M SERVICE: CPT | Mod: HCNC,S$GLB,, | Performed by: PSYCHIATRY & NEUROLOGY

## 2023-02-03 PROCEDURE — 96132 NRPSYC TST EVAL PHYS/QHP 1ST: CPT | Mod: 59,HCNC,S$GLB, | Performed by: PSYCHIATRY & NEUROLOGY

## 2023-02-03 PROCEDURE — 3288F FALL RISK ASSESSMENT DOCD: CPT | Mod: HCNC,CPTII,S$GLB, | Performed by: PSYCHIATRY & NEUROLOGY

## 2023-02-03 PROCEDURE — 96116 PR NEUROBEHAVIORAL STATUS EXAM BY PSYCH/PHYS: ICD-10-PCS | Mod: 59,HCNC,S$GLB, | Performed by: PSYCHIATRY & NEUROLOGY

## 2023-02-03 PROCEDURE — 1159F PR MEDICATION LIST DOCUMENTED IN MEDICAL RECORD: ICD-10-PCS | Mod: HCNC,CPTII,S$GLB, | Performed by: PSYCHIATRY & NEUROLOGY

## 2023-02-03 NOTE — PROGRESS NOTES
[facility]    PATIENT: Shelby Thompson  DATE: 02/02/2023  MRN: [id number]  PRIMARY PROVIDER: Emanuel Arevalo MD    Future Appointments   Date Time Provider Department Center   2/3/2023 10:30 AM Emanuel Drummond MD [facility] NEURO[address]   2/10/2023 10:20 AM [facility]   2/24/2023  8:00 AM [facility]   2/24/2023  9:00 AM [facility]   2/24/2023 10:00 AM NURSE 9, [facility] [place]   4/18/2023  8:00 AM Emanuel Arevalo Jr., MD [facility] PCW           I reviewed old records and/or communicated with other professionals or the patient's family from 06:00 until 06:45 PM on 02/02/2023. This is directly related to a face-to-face visit encounter with the patient (Evaluation and Management service) conducted on 2023-02-03.    I reviewed the following documentation for a total of 45 minutes.    CPT codes for billing for prolonged evaluation and management service (non-face-to-face review of records or communications with patient's family or other medical professionals):  21531  [facility] EMR records I reviewed include:     Relevant Background/Context  Known Relevant Family history:  Their family history includes Cancer in her father; Cancer (age of onset: 63) in her sister; Dementia in her sister; Fibroids in her daughter; Hypertension in her daughter, maternal grandmother, and son; No Known Problems in her brother, brother, maternal aunt, maternal grandfather, maternal uncle, mother, paternal aunt, paternal grandfather, paternal grandmother, and paternal uncle.  Neurocognitive Disorder:  Dementia in her sister  Movement Disorder:  The family denies a history of movement disorder (PD, PDD, tremor, etc).  Motorneuron Disorder:  The family denies a history of motor neuron disease (ALS).  Developmental Disorder:  The family denies a history of developmental learning  disorder (Dyslexia, ADHD, ASD, etc.).  Psychiatric Disorder:  The family denies a history of mood/substance abuse disorder (MDD, PEYTON, Schizophrenia, etc.).  Known Relevant Genetics:  There is no known relevant genetic testing available.  Developmental Milestones:  The patient/family report no known birth complications or early life problems. The patient met all developmental milestones.  Education/Learning Capacity:  The patient/family report no signs or symptoms suggestive of developmental learning disorder.  HS.  Estimated Educational Experience: 12 years of formal education.  Social History:  The patient reports that she quit smoking about 10 years ago. Her smoking use included cigarettes. She started smoking about 55 years ago. She has a 11.00 pack-year smoking history. She has never used smokeless tobacco. She reports previous alcohol use. She reports that she does not use drugs.  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)  Drug-induced polyneuropathy  Hemiplegia and hemiparesis following cerebral infarction affecting right dominant side  Recurrent major depressive disorder, in full remission  Chronic obstructive pulmonary disease, unspecified COPD type  Hyperlipemia  Thoracic aortic aneurysm without rupture  Aortic atherosclerosis  PAD (peripheral artery disease)  Pulmonary heart disease  PVC (premature ventricular contraction)  Essential hypertension  Budd-Chiari syndrome  Thrombocytopenia, unspecified  Multiple myeloma in relapse  Metastatic multiple myeloma to bone  Status post autologous bone marrow transplant  Multiple myeloma in remission  Type 2 diabetes mellitus without complication, without long-term current use of insulin  Liver mass  Osteopenia     Neurocognitive Disorder Features  Onset/Duration:  Apr 2018 (~4-year)  First  "Symptom:  Memory impairment  Progression:  Gradually Progressive  Clinical Course:  Electronic Medical Record (Approximately 2014)  Type: Chart Review. She was seen by Dr. Johnson in [facility], most recently in 2014, who noted that her motor symptoms were most consistent with "hemorrhagic thalamic outflow tremor +/- hemiballism. " She also had mild right hemiparesis. She has not seen movement neurology since that time. While she was noticing cognitive changes, she was able to continue working in her role as a  at the [employer].  Electronic Medical Record (Approximately 2020)  Type: Chart Review. MsDev the patient continually referred to 2019 as the time of her second stroke, while medical records document strokes in 2008 and 2011. She seemed to be referencing a hospitalization in 4/2019 that led to the diagnosis of multiple myeloma. Treatment course per medical records: "She achieved and tolerated well VRd x completing 7, 28 day cycles and achieving deep VGPR to induction. Then underwent Melphalan autologous stem cell transplant on 2/12/2020. " Maintenance with velcade started on 5/2020. She relapsed about 1 year after transplant. Per medical records: "serial biochemical relapse Noted and given this and high risk CG transitioned to weekly Sravanthi/Noble (1mg/m2) /dex Salvage to which she is tolerating and responding to well as of 1/20/22 biochemical studies.  Neurologist (01/17/2022)  Type: Chart Review. Under treatment for multiple myeloma (she had a prior bone marrow transplant, and now received chemotherapy) , has presented to the Clinic with a four year history of progressive problems with short term memory. She states that she may forget objects at home, or have difficulty tracking appointments. However, she also notes that she may lose her place in a conversation--forget what she was discussing, The problems began two years before the patient's diagnosis of multiple myeloma. The patient was " accompanied to the examination by a her friend. Her friend pointed out that while that the patient was walking with her, two or three months ago, she pointed out the presence of a dog in their path. There was no dog present, in reality. The patient is uncertain as to details of the event. This finding has not recurred. However, it is noted that both velcade (for multiple myeloma) and alirocumab (for lowering of cholesterol) may promote hallucinations. Noted in the patients history, and under consideration as an etiology for memory issues, is that of a history of stroke. The patient has a history of hemorrhagic stroke in 2008 (in the region of the thalamus), and a history of hemorrhagic stroke in 2011 (in the region of the left posterior frontal lobe). Residual effects of these stroke events have included dysarthria (mild),, and right sided weakness. The patient notes that over time, she has developed difficulty with walking distances due to strength and due to balance. In the last year, the patient reports a tremor at the right arm that has made it difficult for her to write. While she has been seen by neurologists in the past, records here do not indicate neurological followup in the [facility]. MRI scans of the brain were reviewed from 4-22-13 and 4-30-19. They reveal bilateral basal ganglia stroke, and thalamic stroke. The earlier study shows additional evidence of prior hemorrhage at the right posterior inferior temporal lobe and the left occipital lobe. An MRI scan of the brain, without and with contrast, is pending. 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 The patient takes gabapentin for symptoms of discomfort with peripheral neuropathy. The patient denies any recent stroke events, or TIA symptomatology.  Neuropsychologist (02/16/2022)  Type: Chart Review. Ms. the patient  reported progressive cognitive decline. Medical records also document collateral informants reporting further cognitive dysfunction. Ms. the patient is primarily frustrated by word finding difficulties, noting that she can become so upset that it brings her to tears. She also is prone to losing her train of thought. She has not participated in speech therapy in years. She also reported memory loss, but had trouble providing examples of forgetfulness. She indicated that her  now provides support/oversight in all complex activities of daily living. She reported worsening right hemiparesis, to the extent that it's hard for her to hold a pencil in her right hand. She indicated that her oncologist told her that this may be related to chemotherapy. Endorsed, she reported visual hallucinations over the past 6-12 months. She tends to see people with animals, mainly in the distance. She recalled being at the hospital and seeing one of her doctors walking their dog in the distance when looking out the window. There are also times when she sees children playing in the street or someone playing with a dog on the porch. She recalled an few instances when she was convinced her 3 year old granddaughter was at the house, but she wasn't. Ms. the patient is a 71 year old female with multiple strokes (2008, 2011), possible cerebral amyloid angiopathy, and multiple myeloma (2019) s/p autologous stem cell transplant in 2020. Currently maintained on Sravanthi/Noble/Dex. Ms. the patient and her family report post-stroke cognitive dysfunction, but with progressive decline over the past several years. Her  now provides support/oversight in all complex activities of daily living. She is scheduled for neuropsychological testing on 3/7 for further clarification of cognitive strengths/weaknesses. Full diagnostic impressions to follow.  Neurologist (02/22/2022)  Type: Chart Review. Accompanied by good her friend. Started after stroke (3  total). First stroke was 11 years ago, most recent stroke she believes was about 7 years. Tremors started after this and has worsened in the past year - year and a half. Only in the R hand. Always present. She has to hold her arm down with her left hand. Affects her when she eats. Eating with her left hand. No tremors in her left hand. Balance issues since the strokes. Had a bone marrow transplant a few years ago. Notices tremor at rest as well. No falls. When she was getting chemo, she thought she saw a doctor with a dog. Sees dogs. She states it has only happened twice but her friend believes it happens more than that. No scary hallucinations. Scheduled for neuropsychology testing March 7th. High amplitude resting tremor of R hand. High amplitude, chaotic tremor of R hand, postural and kinetic, worsen in wing beating position and outstretched arm. Outflow tremor, mild choreoathetosis of RUE as well. Similarly there is mild choreoathetosis of RLE only on distraction.  Neuropsychologist (03/07/2022)  Type: Chart Review. Ms. the patient is a 71 year old female with multiple strokes (2008, 2011), possible cerebral amyloid angiopathy, and multiple myeloma (2019) s/p autologous stem cell transplant in 2020 and currently maintained on Sravanthi/Noble/Dex. Ms. the patient and her family report post-stroke cognitive dysfunction (particularly after the second stroke), but with progressive decline since cancer diagnosis/treatment. Her  now provides support/oversight in all complex activities of daily living. Neuropsychological testing was primarily consistent with a pattern of frontosubcortical dysfunction, highlighted by reduced cognitive organization/retrieval, processing speed, verbal fluency, naming, ability to maintain a complex set, and conceptual/abstract reasoning. Her test scores and functioning are at the level of mild cognitive impairment (MCI) and close to the threshold between MCI/dementia. Etiology appears  multifactorial, including cerebrovascular disease, post AutoSCT effects, and chemotherapy. She does not present with the type of duncan forgetting seen in Alzheimer's disease. Her recurring, vivid visual hallucinations do raise the possibility of Lewy Body pathology, although relatively low index of suspicion for DLB at this time. With that said, it may be difficult to assess for parkinsonism in the setting of post-stroke tremor/hemiballism. Importantly, she does not present with the duncan visuospatial dysfunction typically seen in early DLB. Regardless, it will be important to monitor her cognition/functioning over time. Several recommendations are offered to assist in her care/treatment.  [facility] - Emanuel Drummond MD. Neurologist (04/27/2022)  Type: Chart Review. RESCHEDULED LAST MOMENT.     Current Presentation  Recent/Interim History:  2008 - hemorrhagic stroke in thalamus  2011 - hemorrhagic stroke in 2011 (in the region of the left posterior frontal lobe). 2017 - NCD  2015 - tremor  2019 - Multiple myeloma s/p chemo and transplant  2020 - s/p autologous stem cell transplant  2021 - hallucinations - she pointed out the presence of a dog in their path. There was no dog present, in reality. Endorsed, she reported visual hallucinations over the past 6-12 months. She tends to see people with animals, mainly in the distance. She recalled being at the hospital and seeing one of her doctors walking their dog in the distance when looking out the window. There are also times when she sees children playing in the street or someone playing with a dog on the porch. She recalled an few instances when she was convinced her 3 year old granddaughter was at the house, but she wasn't. 2022 - [employer] MCI.  Unresolved Concern(s) reported by patient/family:  Multiple myeloma s/p chemo and transplant - velcade  alirocumab causes hallucinations?  CVA - dysarthria (mild),, and right sided weakness.  Tremor - RUE  thalamic outflow tremor, 2/2 L thalamic infarct  Gait - sensory ataxia likely multifactorial 2/2 multiple myeloma, chemo-induced peripheral neuropathy  Hallucinations          Neuroimaging:    MRI brain/head with and without contrast on 2/3/2022  Formal interpretation by Radiology:  Multifocal areas of remote parenchymal hemorrhage similar to prior exam. Distribution primarily suggestive of chronic hypertension, but few peripheral cerebral foci do raise the possibility of underlying cerebral amyloid angiopathy in a patient of this age. Mild chronic microvascular ischemic change without evidence of recent or remote major vascular distribution infarct. 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  Independently reviewed radiological imaging by Emanuel Tyler MD. MPH. Behavioral Neurologist  T1: mild dorsal predominant cortical atrophy without clear frontal component or midline component. The does appear to be mild posterior parietal atrophy but not clearly in a pattern suggestive of Alzheimer's disease. Bilateral hippocampi are largely spared. No significant subcortical atrophy pattern or hydrocephalus.  T2/FLAIR: Mild-to-moderate greater degree of subcortical strokes in the bilateral basal ganglia with bilateral periventricular capping. Multifocal basal ganglia infarcts suggestive of hypertensive microvascular disease.  DWI/ADC: No Significant DWI hyperintensities/hypointensities. No ADC correlation.  SWI/GRE: Multiple foci of susceptibility artifact with focal volume loss in keeping with areas of prior hemorrhage. 2 cm focus in the posterior aspect of the left superior frontal gyrus, 2 cm focus in the posteroinferior right temporal lobe. Additional small left thalamic and right putaminal hemorrhages. Scattered punctate foci of prior hemorrhage in right thalamus, bilateral basal ganglia, right cerebellum with a few peripheral cerebral lesions.  Impression: : Mild  generalized cortical atrophy with slight posterior parietal lobule predominance however no atrophies strongly suggestive of any specific neuro degenerative condition. Multifocal areas of remote parenchymal hemorrhage similar to prior exam. 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 Lesion likely primarily supplied via a distal right BETHANIE branches with superficial cortical venous drainage - likely explains atypical distribution of subcortical ICH.     Working Diagnosis/Differential  VAD r/o DLB?     Sincerely,  Emanuel Drummond MD. MPH.    B[facility] Disorders Program  [facility]

## 2023-02-03 NOTE — PROGRESS NOTES
[facility]     PATIENT: Shelby Thompson  VISIT DATE: 2023  MRN: [id number]  PRIMARY PROVIDER: Emanuel Arevalo MD  : 1950       Chief complaint: Progressive Cognitive Impairment     History of present illness:      The patient is a 73-year-old right-handed female who presents today to the [facility] due to concerns related to Progressive Cognitive Impairment.  The patient is accompanied by the friend who participates in providing history.  Additional information is obtained by reviewing available medical records.     Relevant Background/Context  Known Relevant Family history:  Mother -  at age 40s murdered  Father -  at age 63 cancer  Nephew with MM in 40-50s  Dementia in her sister  Fibroids in her daughter  Hypertension in her daughter, maternal grandmother, and son  Neurocognitive Disorder:  Sister - EOAD onset mid 60 alive mid 70s, hallucinations onset in 70s  Movement Disorder:  The family denies a history of movement disorder (PD, PDD, tremor, etc).  Motorneuron Disorder:  The family denies a history of motor neuron disease (ALS).  Developmental Disorder:  The family denies a history of developmental learning disorder (Dyslexia, ADHD, ASD, etc.).  Psychiatric Disorder:  The patient/family denies a history of MDD, BD, PEYTON, Schizophrenia.  Known Relevant Genetics:  There is no known relevant genetic testing available.  Developmental Milestones:  The patient/family report no known birth complications or early life problems. The patient met all developmental milestones.  Education/Learning Capacity:  The patient/family report no signs or symptoms suggestive of developmental learning disorder.  HS.  BSN+2 years, did not complete  Estimated Educational Experience: 12 years of formal education.  Social History:  The patient reports that she quit smoking about 10 years ago. Her smoking use included  "cigarettes. She started smoking about 55 years ago. She has a 11.00 pack-year smoking history. She has never used smokeless tobacco. She reports previous alcohol use. She reports that she does not use drugs.  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)  Drug-induced polyneuropathy  Hemiplegia and hemiparesis following cerebral infarction affecting right dominant side  Recurrent major depressive disorder, in full remission  Chronic obstructive pulmonary disease, unspecified COPD type  Hyperlipemia  Thoracic aortic aneurysm without rupture  Aortic atherosclerosis  PAD (peripheral artery disease)  Pulmonary heart disease  PVC (premature ventricular contraction)  Essential hypertension  Budd-Chiari syndrome  Thrombocytopenia, unspecified  Multiple myeloma in relapse  Metastatic multiple myeloma to bone  Status post autologous bone marrow transplant  Multiple myeloma in remission  Type 2 diabetes mellitus without complication, without long-term current use of insulin  Liver mass  Osteopenia     Neurocognitive Disorder Features  Onset/Duration:  Apr 2017 (~5-year)  First Symptom:  Memory impairment  Progression:  Fluctuating  Clinical Course:  Electronic Medical Record (Approximately 2014)  Type: Chart Review. She was seen by Dr. Johnson in [facility], most recently in 2014, who noted that her motor symptoms were most consistent with "hemorrhagic thalamic outflow tremor +/- hemiballism. " She also had mild right hemiparesis. She has not seen movement neurology since that time. While she was noticing cognitive changes, she was able to continue working in her role as a  at the [employer].  Electronic Medical Record (Approximately 2020)  Type: Chart Review. Ms. the patient continually referred to 2019 as the time of her second stroke, while " "medical records document strokes in 2008 and 2011. She seemed to be referencing a hospitalization in 4/2019 that led to the diagnosis of multiple myeloma. Treatment course per medical records: "She achieved and tolerated well VRd x completing 7, 28 day cycles and achieving deep VGPR to induction. Then underwent Melphalan autologous stem cell transplant on 2/12/2020. " Maintenance with velcade started on 5/2020. She relapsed about 1 year after transplant. Per medical records: "serial biochemical relapse Noted and given this and high risk CG transitioned to weekly Sravanthi/Noble (1mg/m2) /dex Salvage to which she is tolerating and responding to well as of 1/20/22 biochemical studies.  Neurologist (01/17/2022)  Type: Chart Review. Under treatment for multiple myeloma (she had a prior bone marrow transplant, and now received chemotherapy) , has presented to the Clinic with a four year history of progressive problems with short term memory. She states that she may forget objects at home, or have difficulty tracking appointments. However, she also notes that she may lose her place in a conversation--forget what she was discussing, The problems began two years before the patient's diagnosis of multiple myeloma. The patient was accompanied to the examination by a her friend. Her friend pointed out that while that the patient was walking with her, two or three months ago, she pointed out the presence of a dog in their path. There was no dog present, in reality. The patient is uncertain as to details of the event. This finding has not recurred. However, it is noted that both velcade (for multiple myeloma) and alirocumab (for lowering of cholesterol) may promote hallucinations. Noted in the patients history, and under consideration as an etiology for memory issues, is that of a history of stroke. The patient has a history of hemorrhagic stroke in 2008 (in the region of the thalamus), and a history of hemorrhagic stroke in 2011 (in " the region of the left posterior frontal lobe). Residual effects of these stroke events have included dysarthria (mild),, and right sided weakness. The patient notes that over time, she has developed difficulty with walking distances due to strength and due to balance. In the last year, the patient reports a tremor at the right arm that has made it difficult for her to write. While she has been seen by neurologists in the past, records here do not indicate neurological followup in the [facility]. MRI scans of the brain were reviewed from 4-22-13 and 4-30-19. They reveal bilateral basal ganglia stroke, and thalamic stroke. The earlier study shows additional evidence of prior hemorrhage at the right posterior inferior temporal lobe and the left occipital lobe. An MRI scan of the brain, without and with contrast, is pending. 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 The patient takes gabapentin for symptoms of discomfort with peripheral neuropathy. The patient denies any recent stroke events, or TIA symptomatology.  Neuropsychologist (02/16/2022)  Type: Chart Review. Ms. the patient reported progressive cognitive decline. Medical records also document collateral informants reporting further cognitive dysfunction. Ms. the patient is primarily frustrated by word finding difficulties, noting that she can become so upset that it brings her to tears. She also is prone to losing her train of thought. She has not participated in speech therapy in years. She also reported memory loss, but had trouble providing examples of forgetfulness. She indicated that her  now provides support/oversight in all complex activities of daily living. She reported worsening right hemiparesis, to the extent that it's hard for her to hold a pencil in her right hand. She indicated that her oncologist told  her that this may be related to chemotherapy. Endorsed, she reported visual hallucinations over the past 6-12 months. She tends to see people with animals, mainly in the distance. She recalled being at the hospital and seeing one of her doctors walking their dog in the distance when looking out the window. There are also times when she sees children playing in the street or someone playing with a dog on the porch. She recalled an few instances when she was convinced her 3 year old granddaughter was at the house, but she wasn't. Ms. the patient is a 71 year old female with multiple strokes (2008, 2011), possible cerebral amyloid angiopathy, and multiple myeloma (2019) s/p autologous stem cell transplant in 2020. Currently maintained on Sravanthi/Noble/Dex. Ms. the patient and her family report post-stroke cognitive dysfunction, but with progressive decline over the past several years. Her  now provides support/oversight in all complex activities of daily living. She is scheduled for neuropsychological testing on 3/7 for further clarification of cognitive strengths/weaknesses. Full diagnostic impressions to follow.  Neurologist (02/22/2022)  Type: Chart Review. Accompanied by good her friend. Started after stroke (3 total). First stroke was 11 years ago, most recent stroke she believes was about 7 years. Tremors started after this and has worsened in the past year - year and a half. Only in the R hand. Always present. She has to hold her arm down with her left hand. Affects her when she eats. Eating with her left hand. No tremors in her left hand. Balance issues since the strokes. Had a bone marrow transplant a few years ago. Notices tremor at rest as well. No falls. When she was getting chemo, she thought she saw a doctor with a dog. Sees dogs. She states it has only happened twice but her friend believes it happens more than that. No scary hallucinations. Scheduled for neuropsychology testing March 7th. [facility]  amplitude resting tremor of R hand. High amplitude, chaotic tremor of R hand, postural and kinetic, worsen in wing beating position and outstretched arm. Outflow tremor, mild choreoathetosis of RUE as well. Similarly there is mild choreoathetosis of RLE only on distraction.  Neuropsychologist (03/07/2022)  Type: Chart Review. Ms. the patient is a 71 year old female with multiple strokes (2008, 2011), possible cerebral amyloid angiopathy, and multiple myeloma (2019) s/p autologous stem cell transplant in 2020 and currently maintained on Sravanthi/Noble/Dex. Ms. the patient and her family report post-stroke cognitive dysfunction (particularly after the second stroke), but with progressive decline since cancer diagnosis/treatment. Her  now provides support/oversight in all complex activities of daily living. Neuropsychological testing was primarily consistent with a pattern of frontosubcortical dysfunction, highlighted by reduced cognitive organization/retrieval, processing speed, verbal fluency, naming, ability to maintain a complex set, and conceptual/abstract reasoning. Her test scores and functioning are at the level of mild cognitive impairment (MCI) and close to the threshold between MCI/dementia. Etiology appears multifactorial, including cerebrovascular disease, post AutoSCT effects, and chemotherapy. She does not present with the type of duncan forgetting seen in Alzheimer's disease. Her recurring, vivid visual hallucinations do raise the possibility of Lewy Body pathology, although relatively low index of suspicion for DLB at this time. With that said, it may be difficult to assess for parkinsonism in the setting of post-stroke tremor/hemiballism. Importantly, she does not present with the duncan visuospatial dysfunction typically seen in early DLB. Regardless, it will be important to monitor her cognition/functioning over time. Several recommendations are offered to assist in her care/treatment.  [facility] - Emanuel Drummond MD. Neurologist (04/27/2022)  Type: Chart Review. RESCHEDULED LAST MOMENT.     Current Presentation  Recent/Interim History:  The patient presents with her friend who is the primary historian. Additional history is gathered from review of previous medical records. The patient has a long and complicated history CNS injuries. The patient had 1st hemorrhagic stroke of the thalamus in 2008 resulting in transient aphasia. The patient regained her speech over the next 3 years she had a 2nd hemorrhagic stroke in 2011 in the left posterior frontal lobe. Review of brain imaging indicates an AVM in left thalamic region likely the provoking factor in her hemorrhagic stroke. The patient would gradually improve following these events however would have a persistent thalamic outflow tremor and dysarthria following the last hemorrhagic stroke in 2011. Beginning around 2017 her family became increasingly concerned cognitive deficits. The patient is not available at the time of presentation however her friend reports that during this time her  came up to the patient's her friend reported that he was concerned that her increasing amnesia train of thought deficits and asked her friend to keep an eye out on her. Symptoms were mildly progressive thereafter however not significantly interfering with day-to-day activities. The patient was diagnosed with multiple myeloma and 2019 with undergo multiple rounds of chemotherapy s/p autologous stem cell transplant in 2020. In the setting of ongoing chemotherapy and multiple medications the patient reports increasing fatigue in train of thought deficits. In 2021 the patient began having hallucinations. Hallucinations and she started out her seeing a dog while at [facility] during transfusion for chemotherapy. This eventually increased to seen people and animals mostly at a distance but often in her house sometimes in hospital are sometimes out of her window. This  increased to a time seeing children playing in the street playing with the dog on the porch. The majority of the time the patient has been able to recognize these were hallucinations however has been convinced on multiple occasions. There were no command hallucinations fearful hallucinations or paranoia. Hallucinations do not appear to increase in frequency during the night and are not strictly associated with sleep disorder. In the setting of increasing fatigue and hallucinations patient's her family reported increasing concerns of amnesia train of thought deficits. The patient was evaluated by neuropsychology in 2022 and diagnosed with executive predominant mild cognitive impairment. On presentation the patient reports concerns of increasing amnesia train of thought deficits however has limited insight into severity of cognitive impairment. The patient is fully aware of her hallucinations though has difficulty recognizing reality hallucinations time. her family deny any significant other visual spatial deficits language problems aside from baseline dysarthria secondary to thalamic stroke. The patient is otherwise largely independent however shared responsibilities for her  for the majority of instrumental activities of daily living. The patient her  has had multiple cardiac events and they are both dependent on 1 another for maintenance of household responsibilities. Primary questions posed by her family today are whether hallucinations are due to medication side effect or part of her cognitive impairment. Primary concerns focalized by her family include need for additional care support at home and prognostication.  Unresolved Concern(s) reported by patient/family:  Multiple myeloma s/p chemo and transplant - velcade  alirocumab causes hallucinations?  CVA - dysarthria (mild),, and right sided weakness.  Tremor - RUE thalamic outflow tremor, 2/2 L thalamic infarct  Gait - sensory ataxia likely  multifactorial 2/2 multiple myeloma, chemo-induced peripheral neuropathy  Hallucinations     Review of cognitive, visuospatial, motor, sensory, and behavioral systems:     Memory:   The patient's memory has worsened in the past few years.  She does repeat statements or asks the same question repeatedly.  She does have difficulty remembering recent important conversations.  She does have difficulty remembering recent events.  She does forget information within minutes.  Her recent retrograde memory is intact.  Her remote memory is intact.  Attention:   The patient's attention and concentration are impaired.  She does have attentional fluctuations.  She does have difficulty with selective attention.  She does become easily distracted.  She does have difficulty with divided attention.  Executive:   The patient's cognitive processing speed is slower.  She does have difficulty with working memory.  She does misplace personal items (e.g., keys, cell phone, wallet) more frequently.  She does have difficulty keeping track of her medications.  She does have difficulty with planning/organizing/completing multistep tasks.  She does have not difficulty with executive attention.  She does have difficulty with flexible thinking.  She does not have difficulty with response inhibition.  She denies new impulsivity or rash/careless actions.  Her judgment is intact.  Language:   The patient's speech is affected.  She does forget people's names more frequently.  She does not have word-finding difficulties.  Her speech is fluent and non-effortful.  Her speech is grammatically intact.  She does not make word substitutions.  She does not have difficulty reading.  She does not appear to have impaired comprehension.  Visuospatial:   The patient has new visuospatial problems.  She has become confused or disoriented in *new*, unfamiliar places.  She does have trouble navigating.  She does not get lost in familiar places.  She does not have  visuospatial disorientation.  She does not have difficulty recognizing objects or faces.  She denies problems with driving or parking.  Motor/Coordination:   The patient does have difficulty with walking.  She does feel imbalanced.  She has fallen.  She reports new muscle weakness.  She does have difficulty buttoning shirts, operating zippers, or manipulating tools/utensils.  Her handwriting has not become micrographic.  She does have a resting tremor.  She denies having any new involuntary movements and/or muscle jerking.  She does not have swallowing difficulty.  She denies new muscle cramps and twitching.  Sensory:   The patient denies new numbness, tingling, paresthesias, or pain.  The patient reports new loss of vision, blurry vision, and/or double vision.  The patient denies new loss of hearing or worsening tinnitus.  The patient denies anosmia.  Sleep:   The patient reports difficulty sleeping.  The patient does have difficulty going to sleep.  The patient denies difficulty staying asleep or frequently awakening at night.  The patient does not snore or have witnessed apneas while sleeping.  When she wakes up in the morning, she does not feel well-rested.  She denies dream-enactment behavior.  She denies symptoms suggestive of restless leg syndrome.  Behavior:   The patient's personality has changed.  She does not have symptoms of disinhibition and social inappropriateness.  She does not have symptoms to suggest a loss of manners or decorum.  She does not appear apathetic or has decreased motivation.  She does appear to have had a change in behavioral/emotional inertia.  There is no report that The patient has had a change in their emotional expression.  She does not have emotional blunting or lability.  She does have symptoms of irritability and mood lability.  She does not have symptoms of agitation, aggression, or violent outbursts.  Her insight into his health and situation is intact.  Her personal hygiene  is intact.  She is not exhibiting a diminished response to other people's needs and feelings.  She is not exhibiting a diminished social interest, interrelatedness, or personal warmth.  She denies restlessness.  She denies new and/or worsening simple repetitive behaviors.  Her speech has not become simplified or become repetitive/stereotyped.  She denies new/worsening complex repetitive/ritualistic compulsions and behaviors.  She does not have symptoms of hyper-religiosity or dogmatism.  Her interests/pleasures have not become restrictive, simplified, interrupting, or repetitive.  She denies a change of self-stimulating behavior.  She denies any changes in eating behavior.  She denies increased consumption of food or substances.  She denies oral exploration or consumption of inedible objects.  Psychiatric:   She does feel depressed.  She is exhibiting symptoms of social withdrawal/indifference.  She denies anxiety.  She does not exhibit cycling behavior.  She does not exhibit hyperactive behavior.  She is exhibited symptoms of paranoia.  She does have delusions.  She does have hallucinations.  She does have a history of sensitivity to neuroleptic/psychotropic medications.  Medical Review of Systems:   The patient does not have constipation.  The patient does not have urinary incontinence.  The patient denies orthostatic lightheadedness.  The patient's weight is unstable.  Functional status:  Difficulty performing the following Instrumental ADLs:  Housekeeping: No  Food Preparation: No  Shopping: No  Ability to Handle Finances: Yes  Transportation/Driving: No  Household Appliances/Stove: No  Laundry: No  Difficulty performing the following Basic ADLs:  Dressing: No  Bathing: No  Toileting: No  Personal hygiene and grooming: No  Feeding: No  Care Management:  Patient/Family Safety Concerns:  Medication Adherence: No  Home Safety: No  Wandered: No  Firearms: No  Fall Risk: No  Home Alone: No          Past Medical  History:   Diagnosis Date    Acute respiratory failure with hypoxia 4/30/2019    FUENTES (acute kidney injury) 5/1/2019    Allergy     Anemia     Aortic aneurysm     Breast cancer 10/2011    left breast Stage 0 DCIS    Cataract     Chronic diastolic congestive heart failure 11/6/2015    Colon polyp     Community acquired pneumonia of right lower lobe of lung 8/3/2021    GERD (gastroesophageal reflux disease)     Hiatal hernia     History of colonic polyps     Hx of psychiatric care     Zoloft    HX: breast cancer     Hyperlipemia     Hypertension     ICH (intracerebral hemorrhage)     Immunocompromised 10/28/2022    Major depressive disorder, single episode, mild 6/23/2016    Nuclear sclerosis 7/21/2014    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 7/21/2014    PAD (peripheral artery disease) 11/6/2015    Psychiatric problem     Statin-induced rhabdomyolysis     4/2015     Stroke 4/2011    Type 2 diabetes mellitus with diabetic peripheral angiopathy without gangrene, with long-term current use of insulin 3/31/2021    Type 2 diabetes mellitus without complication, without long-term current use of insulin 2/10/2020    Walker as ambulation aid        Past Surgical History:   Procedure Laterality Date    APPENDECTOMY      BONE MARROW BIOPSY Left 10/3/2019    Procedure: Biopsy-bone marrow;  Surgeon: Jere Tineo MD;  Location: [facility] OR [address] [place] [address];  Service: Oncology;  Laterality: Left;    BREAST BIOPSY Left 10/2011    BREAST LUMPECTOMY Left 2011    DCIS    COLONOSCOPY      COLONOSCOPY N/A 1/9/2020    Procedure: COLONOSCOPY;  Surgeon: Quang De La Cruz MD;  Location: [facility] (4TH FLR);  Service: Endoscopy;  Laterality: N/A;    CYST REMOVAL      back    ESOPHAGOGASTRODUODENOSCOPY  07/2016    duodenal angioectasia    ESOPHAGOGASTRODUODENOSCOPY N/A 1/9/2020    Procedure: EGD (ESOPHAGOGASTRODUODENOSCOPY);  Surgeon: Quang De La Cruz MD;  Location: [facility] ENDO (4TH FLR);  Service: Endoscopy;   Laterality: N/A;    FOOT FRACTURE SURGERY  10/2012    right foot, with R hallux valgus repair    FRACTURE SURGERY Right     broken toe repiar    HEMORRHOID SURGERY      LIVER BIOPSY  2015    essentially normal, no fibrosis    TUBAL LIGATION      UPPER GASTROINTESTINAL ENDOSCOPY         Family History   Problem Relation Age of Onset    Dementia Sister         x1 sister    Cancer Sister 63        Lung Cancer    No Known Problems Mother     Cancer Father     No Known Problems Brother     Hypertension Daughter     Fibroids Daughter         uterine    Hypertension Son     No Known Problems Brother     No Known Problems Maternal Aunt     No Known Problems Maternal Uncle     No Known Problems Paternal Aunt     No Known Problems Paternal Uncle     Hypertension Maternal Grandmother     No Known Problems Maternal Grandfather     No Known Problems Paternal Grandmother     No Known Problems Paternal Grandfather     Ovarian cancer Neg Hx     Colon cancer Neg Hx     Tremor Neg Hx     Amblyopia Neg Hx     Blindness Neg Hx     Cataracts Neg Hx     Diabetes Neg Hx     Glaucoma Neg Hx     Macular degeneration Neg Hx     Retinal detachment Neg Hx     Strabismus Neg Hx     Stroke Neg Hx     Thyroid disease Neg Hx     Esophageal cancer Neg Hx     Rectal cancer Neg Hx     Stomach cancer Neg Hx     Ulcerative colitis Neg Hx     Crohn's disease Neg Hx     Irritable bowel syndrome Neg Hx     Celiac disease Neg Hx        Social History     Socioeconomic History    Marital status:      Spouse name: Moises    Number of children: 2   Tobacco Use    Smoking status: Former     Packs/day: 0.25     Years: 44.00     Pack years: 11.00     Types: Cigarettes     Start date:      Quit date: 2011     Years since quittin.8    Smokeless tobacco: Never   Substance and Sexual Activity    Alcohol use: Not Currently     Comment: none since 2019, very rare wine     Drug use: No    Sexual activity: Yes     Partners: Male     Birth  control/protection: Post-menopausal   Social History Narrative     -Moises    2 children (Delia Li)     Social Determinants of Health     Financial Resource Strain: Low Risk     Difficulty of Paying Living Expenses: Not hard at all   Food Insecurity: No Food Insecurity    Worried About Running Out of Food in the Last Year: Never true    Ran Out of Food in the Last Year: Never true   Transportation Needs: No Transportation Needs    Lack of Transportation (Medical): No    Lack of Transportation (Non-Medical): No   Physical Activity: Sufficiently Active    Days of Exercise per Week: 7 days    Minutes of Exercise per Session: 30 min   Stress: Stress Concern Present    Feeling of Stress : Rather much   Social Connections: Moderately Isolated    Frequency of Communication with Friends and Family: More than three times a week    Frequency of Social Gatherings with Friends and Family: Once a week    Attends Taoism Services: Never    Active Member of Clubs or Organizations: No    Attends Club or Organization Meetings: Never    Marital Status:    Housing Stability: Low Risk     Unable to Pay for Housing in the Last Year: No    Number of Places Lived in the Last Year: 1    Unstable Housing in the Last Year: No       Medication:     Current Outpatient Medications on File Prior to Visit   Medication Sig Dispense Refill    acyclovir (ZOVIRAX) 400 MG tablet Take 1 tablet (400 mg total) by mouth 2 (two) times daily. 60 tablet 2    albuterol (ACCUNEB) 1.25 mg/3 mL Nebu Take 3 mLs (1.25 mg total) by nebulization every 6 (six) hours as needed (wheeze/cough). Rescue 75 mL 2    alirocumab (PRALUENT PEN) 75 mg/mL PnIj Inject 1 mL (75 mg total) into the skin every 14 (fourteen) days. 6 mL 3    amLODIPine (NORVASC) 5 MG tablet TAKE 1 TABLET BY MOUTH EVERY DAY 90 tablet 2    aspirin (ECOTRIN) 81 MG EC tablet TAKE 1 TABLET BY MOUTH EVERY DAY 90 tablet 3    cholecalciferol, vitamin D3, 125 mcg (5,000 unit) Tab 1  tablet DAILY (route: oral)      DARZALEX FASPRO 1,800 mg-30,000 unit/15 mL Soln       ENGERIX-B, PF, 20 mcg/mL Syrg       ferrous gluconate 324 mg (37.5 mg iron) Tab tablet 1 tablet DAILY (route: oral)      gabapentin (NEURONTIN) 300 MG capsule Take 1 capsule (300 mg total) by mouth 2 (two) times daily. 180 capsule 2    GADAVIST 1 mmol/mL (604.72 mg/mL) Soln injection       metFORMIN (GLUCOPHAGE) 1000 MG tablet Take 1 tablet (1,000 mg total) by mouth 2 (two) times daily with meals. 180 tablet 0    omeprazole (PRILOSEC) 40 MG capsule TAKE 1 CAPSULE BY MOUTH EVERY DAY 90 capsule 3    OXYGEN-AIR DELIVERY SYSTEMS MISC 1-2 Liter O2 - CONTINUOUS (route: Oxygen)      pomalidomide 3 mg Cap TAKE 1 CAPSULE BY MOUTH ONCE DAILY ON DAYS 1-21 OF 28 DAY CYCLE.. 21 capsule 0    potassium chloride SA (K-DUR,KLOR-CON) 20 MEQ tablet       valsartan (DIOVAN) 160 MG tablet Take 1 tablet (160 mg total) by mouth once daily. 90 tablet 3    zoledronic 4 mg/100 mL PgBk infusion       zoledronic acid (ZOMETA) 4 mg/5 mL injection       [DISCONTINUED] sertraline (ZOLOFT) 50 MG tablet Take 1 tablet (50 mg total) by mouth once daily. (Patient not taking: Reported on 1/27/2023) 90 tablet 3     No current facility-administered medications on file prior to visit.        Review of patient's allergies indicates:   Allergen Reactions    Lipitor [atorvastatin] Itching     Itching, elevated cpk, muscle aches, statin induced myositis       Medications Reconciliation:   I have reconciled the patient's home medications and discharge medications with the patient/family. I have updated all changes.  Refer to After-Visit Medication List.    Objective:  Vital Signs:  Vitals:    02/03/23 1040   BP: 122/78   Pulse: 69     Wt Readings from Last 3 Encounters:   02/03/23 1040 65.8 kg (145 lb)   01/27/23 0821 65.3 kg (143 lb 15.4 oz)   01/17/23 0852 67 kg (147 lb 11.3 oz)     Body mass index is 25.69 kg/m².           Neurological examination:  Mental Status:   Her  appearance was abnormal.   Comment: looks older than stated age;  Throughout the interview, she is cooperative, her eye contact is appropriate.  Her behavior was inappropriate to the clinical context and situation.  Her behavior was not characterized by episodes of sudden uncontrollable and inappropriate laughing or crying.  The patient's energy level is abnormal.   Comment: Hypoactive, Fluctuating;  Her orientation is not entirely accurate.  Her attention/concentration is impaired.  She is unable to complete three-step commands without making errors.  Her fund of knowledge was appropriate for age, culture, and level of education.  Her thought process is not logical or goal-oriented.   Comment: circumstantial, bradyphrenia;  She demonstrated impaired insight based on actions, awareness of her illness, plans for the future.  She demonstrated good judgment based on actions and plans for the future.  She has no evidence of hallucinations (auditory, visual, olfactory).  She has no evidence of delusions (paranoid, grandiose, bizarre).  Fundoscopic examination:   Her fundoscopic evaluation did not show evidence of pathology; she had clear optic disk margins without exudates or hemorrhages.  Cranial Nerves:   She showed no evidence of anosmia 3/3 (coffee/vanilla/cinnamon).  Her pupils were normal.  Her elemental visual acuity assessment demonstrated normal OS vision and normal OD vision.  Her visual fields were full to confrontation in all quadrants.  Assessment of extinction with double/simultaneous stimuli was negative in her bilateral visual fields.  Her ocular pursuit in the horizontal and vertical plane was complete.  Her saccades were abnormal.   Comment: decreased initiation;  She demonstrated no square-wave jerks.  Her eyelid assessment showed no apraxia. There was no eyelid dysfunction, retraction, or king sign.  Her blink rate was abnormal.   Comment: decreased;  Her facial strength was normal.  Her facial  "expression was abnormal.   Comment: Hypomimia;  Her facial sensation was intact to light touch bilaterally.  Her hearing was normal bilaterally.  She does not require the use of hearing aids.  Her oropharynx and soft palate appeared abnormal.   Comment: mallampati 4;  Her uvula is mid-line.  Her tongue showed no evidence of scalloping.  She can protrude their tongue beyond Her lips for >10 sec.  She can move their extended tongue back and forth rapidly.  Her tongue showed no evidence of fasciculation or scalloping.  Her sternocleidomastoid and trapezius muscle strength was full bilaterally.  She had no significant evidence of anterocollis or retrocollis.  Speech/Language:   The patient's speech was not completely fluent and non-effortful.  Her speech timbre is abnormal.   Comment: quiet;  Her speech rate is abnormal.   Comment: slow;  Her respirations are abnormal.   Comment: shallow;  Her speech timbre is normal.  She made articulation (segmental features) errors.  She has no speech dysdiadochokinesia with repetition of syllables such as "/PA/, /TA/, /KA/, /OM/".  She made errors during the repetition of rapid syllables and or words.   Comment: 'caterpillar' '', and 'huckleberry'; 'caterpillar' '', and 'huckleberry'  She has no repetition errors of rapid sequences of consonants, such as in "[employer]" or "[employer]".  She made prosody (suprasegmental features) errors.  Her stress assessment showed no repetition errors in linguistically complex words, including multisyllabic words ("planetarium," "questionable," "accomplishment," "phonetic.  Her pitch assessment showed normal range, intonation (Pitch Pattern), and variability.  Her tone was not emotionally limited, and tempo was rhythmic (e.g., "Once upon a midnight dreary, while I pondered, weak and weary, Over many a quaint and curious volume of forgotten allen" and "That government of the people, by the people, for " "the people, shall not perish from the earth").  The patient's speech is dysarthric.  She does not have flaccid dysarthria.  She does not have spastic dysarthria.  She does not have ataxic dysarthria.  She does not have hypokinetic dysarthria.  She does not have hyperkinetic dysarthria.  The patient showed evidence of anomia.   Comment: Mild;  She showed evidence of anomia during spontaneous speech.  She showed evidence of anomia during confrontational naming.   Comment: Mild;  She makes phonological loop errors.   Comment: Mild;  She makes no errors during the repetition of gibberish words (e.g., "Supercalifragilisticexpialidocious," "Pigglywiggly," "Woospiedoo," "Zowzy," "Bazinga").  She makes errors during the repetition of complex meaningless phrases.   Comment: 'The horse raced past the barn fell.', 'The complex houses  and single soldiers and their families,' 'Wishes are hopping, and trees are west,' and 'Brushing liked to tutu dark's direction'.; 'The horse raced past the barn fell.', 'The complex houses  and single soldiers and their families,' 'Wishes are hopping, and trees are west,' and 'Brushing liked to tutu dark's direction'.  She can comprehend commands that cross the midline (e.g., with your left thumb, touch your right ear).  She has difficulty comprehending commands that depend on syntax.   Comment: 'point to the ceiling after you point to the floor'.; 'point to the ceiling after you point to the floor'.  Her speech is grammatically intact; (no function/semantic word substitutions, phonemic/semantic paraphasias, or binary confusion).  She makes no superordinate errors when asked to draw an animal (e.g., elephant/giraffe).  She does not have difficulty understanding puns with multiple meanings. "One morning I shot an elephant in my pajamas. How he got into my pajamas I'll never know.", "Time flies like an arrow. Fruit flies like a banana.", "The best way to communicate with a fish is to " "drop them a line."  Motor:   The patient's bilateral upper extremity muscle bulk is appropriate.  The patient's upper extremity muscle tone is increased.   Comment: B/L, R>L, Mod;  The patient's bilateral upper extremity muscle tone does not suggest spasticity.  The patient's bilateral upper extremity muscle tone does not suggest rigidity/cogwheeling.  There is evidence of paratonia.   Comment: Muscle tone is increased and there is evidence of paratonia.; Muscle tone is increased and there is evidence of paratonia.  There was no dystonia observed on examination.  Assessment of motor strength was symmetric and at minimal anti-gravity.  Deltoid L +5/5 R +5/5 Biceps L +5/5 R +5/5 Triceps L +5/5 R +5/5 Wrist extension L +5/5 R +5/5 Finger abduction L +5/5 R +5/5 Hip flexion L +5/5 R +5/5 Hip extension L +5/5 R +5/5 Knee flexion L +5/5 R +5/5 Knee extension L +5/5 R +5/5 Ankle flexion L +5/5 R +5/5 Ankle extension L +5/5 R +5/5  There is no pronator or downward drift.  There is no upward drift.  There is no outward/diagonal drift.  There is no myoclonus observed in The patient's bilateral upper and lower extremities.  There are no fasciculations observed in The patient's bilateral upper and lower extremities.  Coordination:   She showed evidence of upper extremity limb dysmetria during past pointing on finger-nose-finger.   Comment: R/L; R/L  She has no bilateral lower extremity limb dysmetria during shin rub.  She showed evidence of limb dysdiadochokinesia of the upper extremity on the pronation/supination test.  She has no visible tremor.  She has evidence of interhemispheric motor control deficits.  She demonstrates evidence of motor overflow.  She demonstrates no alien limb phenomena.  She has dyskinetic movements.  She has no athetosis.  She has choreiform movements.  She has no ballistic movements.  She has evidence of akathisia.  She has no tardive dyskinesia.  The patient's upper extremity fine motor coordination " was abnormal.   Comment: B/L, R>L, Mild;  The patient's bilateral upper extremity coordination with finger tapping, pronation/supination, and the open-close fist showed slowing.  The patient's bilateral upper extremity coordination with finger tapping, pronation/supination, and the open-close fist showed hypometria.  The patient's upper extremity fine motor coordination was not dysrhythmic.   Comment: finger tapping, pronation/supination, and the open-close fist showed dysrhythmia.; finger tapping, pronation/supination, and the open-close fist showed dysrhythmia.  Higher Cortical Function:   She had no hemineglect (e.g., line bisection/Eren's test).  The patient showed no evidence of simultanagnosia (Navon hierarchical letters).  She demonstrates no evidence of dorsal simultanagnosia (overlapping objects).  She demonstrates no evidence of ventral simultanagnosia (complex picture synthesis).  The patient showed evidence of visuospatial constructional dysfunction.  The patient showed no evidence of agnosia.  The patient showed evidence of angular gyrus disconnection (insular-operculum).  She has no finger agnosia.  She has no left-right confusion.  She has evidence of dysgraphia.  The patient showed evidence of apraxia.  She showed evidence of ideomotor apraxia performing tool-use pantomimes.   Comment: use a hammer, use a screwdriver, use a comb, flip a coin, waving goodbye.; use a hammer, use a screwdriver, use a comb, flip a coin, waving goodbye.  She showed no evidence of ideational apraxia (e.g., taking off and putting on shoes, folding paper into an envelope).  She showed evidence of limb-motor apraxia during mimicking complex bimanual hand shapes.  She showed no evidence of buccofacial apraxia (e.g., blow out a candle, puff out cheeks, and whistle).  She showed dysexecutive behavior.  She showed no utilization or imitation behavior.  She has no perseverative or stereotyped behavior.  She has no stimulus-bound  behavior.  Sensory:   Her cortical sensory assessment demonstrated no neglect bilaterally.  She he had no astereognosis (paper clip, ring, dime) bilaterally in the palms.  She he had no agraphesthesia (drawing numbers) in the palms.  Her sensation was diminished to light touch, and vibratory sense.   Comment: in the bilateral upper and lower extremities in a length-dependant pattern.; in the bilateral upper and lower extremities in a length-dependant pattern.  Her sensation was intact to temperature/pinprick in the bilateral upper and lower extremities.  Her sensation was intact to joint position sense in the bilateral upper and lower extremities.  Reflexes:   Reflexes were symmetric and 2+ at biceps, 2+ triceps, and 2+ brachioradialis, 2+ at the knees bilaterally, there was no cross-abductor sign, 2+ in the bilateral ankles.  There was no spreading/clonus.  There were no pathological reflexes; No Babinski, bilateral plantar toes go down, no Jaw Jerk Reflex, No Bermudez, No Palmomental reflex, and No Palmar Grasp reflex.  Gait:   She has normal posture sitting unaided.  She is unable to rise from a chair and sit back down without using their arms.  Her gait was abnormal.  Her posture while walking is normal.  Her gait initiation/inhibition was normal.  Her stance while walking is abnormal.   Comment: wide base;  Her gait speed was abnormal (70-80 F 1.13 m/s M 1.26 m/s, >80 F 0.94 m/sec, M 0.97 m/sec).   Comment: slow;  Her stride (gait cycle) was abnormal.   Comment: decreased step-time, decreased step-width, decreased step-length.;  Her arms swing is abnormal.   Comment: B/L, R>L, Mild; asymmetric and decreased amplitude.  She takes turns in >4 steps.  She has truncal ataxia.  When attempting to walk abnormally (heels, tiptoes, tandem), she makes errors.  While walking on her tiptoes for ten steps, she makes deviations.  While walking on her heels for ten steps, She makes no deviations.  While walking tandem for ten  steps, she makes deviations.  While walking with eyes closed for ten steps, she makes deviations.  She has evidence of posture/balance impairment.  Retropulsion testing showed abnormal recovery.  Romberg's sign was not present.  She has evidence of a specific gait disorder.  She has evidence of parkinsonism gait disorder.   Comment: Gait is rigid akinetic with a short step length, a narrow base, and a stooped posture involving the neck, shoulders, and trunk. Arm swing is reduced. Steppage height is reduced (shuffling). Stride variability is increased. When asked to walk faster, patients increase the step frequency rather than step length.; Gait is rigid akinetic with a short step length, a narrow base, and a stooped posture involving the neck, shoulders, and trunk. Arm swing is reduced. Steppage height is reduced (shuffling). Stride variability is increased. When asked to walk faster, patients increase the step frequency rather than step length.  She has no evidence of antalgic gait disorder.  She has no evidence of cerebellar ataxic gait disorder.  She has evidence of sensory ataxic gait disorder.   Comment: Stance and gait appear broad-based and insecure. The step length is shortened. The loss of visual function causes marked worsening of ataxia (Romberg's test, walking with closed eyes).; Stance and gait appear broad-based and insecure. The step length is shortened. The loss of visual function causes marked worsening of ataxia (Romberg's test, walking with closed eyes).  She has no evidence of neuropathic/high-steppage gait disorder.  She has no evidence of cautious gait disorder.  She has no evidence of spastic gait disorder.  She has no evidence of frontal gait disorder.  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  3/2022 MoCA = 17/30  Pre-morbid/Baseline: Estimated to be the in low average/average range.  Language: Low average letter verbal fluency. Below average semantic verbal fluency. Exceptionally low performance on a test of confrontation naming. She offered several semantic paraphasic errors, including tiger for lion, cane/walker for crutch, and balloon for parachute. She benefit from phonemic cueing.  Visuospatial: Copy of a complex figure was impacted by involuntary movements. Otherwise, her copy demonstrated an intact appreciation for the gestalt of the figure with no apparent visuospatial dysfunction. Average performance on a visual puzzles test. Difficult to read her drawing of a clock face due to motor difficulties. Performance on a test of line orientation was mildly below expectation, but her incorrect responses were consistently very close to the correct response.  Learning/Memory: Overall encoding of a surpaspan word list was low average as she recalled 3, 7, and 7 of 12 words across the learning trials. She did not freely recall any words following a long delay. Recognition was exceptionally low as she correctly identified 7/12 words with 2 false positive errors. She encoded 3/5 words after 2 trials on the MoCA. She did not freely recall any words following a brief delay. She recalled 2 words with categorical cueing and correctly identified 1/3 words with multiple choice cueing. Overall encoding of 2 short stories was low average. She retained 7/7 details from the first story following a long delay and 9/10 details from the second story. Her overall recall was average. Responses to yes/no questions pertaining to the stories was WNL.  Executive Functioning: One trial learning/encoding was below expectation. She provided 5/5 correct responses on a serial 7 subtraction task. Average performance on tests of working memory. Low average performance on a test of conceptual/abstract reasoning. Below  average performance on a test requiring her to maintain a complex set. Exceptionally low performance on tests of psychomotor processing speed. Performance improved to the low average range when the motor component was removed from the task. Performance on a card sorting testing was mildly below expectation. She was able to shift from the first to the second category with minimal trouble, but perseverated on the second category for 20 of the last 24 trials of the test.  Mood: She denied all symptoms of anxiety on a self-report inventory. She did not endorse clinical depression.  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   Recall-3 0/3 Impairment: Significant.   Recall-5 0/5 Impairment: Significant.   Registration-5 3/0     T1 3/9 Impairment: -1.6 STDs below the average score based on age and education.   T2 7/9 Within Normal Limits.   T3 6/9 Within Normal Limits.   T4 6/9 Impairment: -1.6 STDs below the average score based on age and education.   T5 6/9 Impairment: -1.6 STDs below the average score based on age and education.   DR-30 Sec 5/9 Within Normal Limits.   Executive    Three-step command 3/3 Within Normal Limits.   Trials-1 0/1 Impairment: Significant.   WORLD Backward 5/5 Within Normal Limits.   Digit Span - 2 2/2 Within Normal Limits.   Serial Sevens 3/3 Within Normal Limits.   Fluency 0/1 Impairment: Significant.   Digit Span Backwards 4 Within Normal Limits.   Lexical Fluency - D 5 Impairment: -2.1 STDs below the average score based on age and education.   Lexical Fluency - F 4 Impairment: -2.3 STDs below the average score based on age and education.   Lexical Fluency - A 6 Impairment: -1.9 STDs below the average score based on age and education.   Lexical Fluency - S 7 Impairment: -1.7 STDs  below the average score based on age and education.   Visuospatial    Intersecting Pentagons 0/1 Impairment: Significant.   3D Cube Copy 0/1 Impairment: Significant.   Clock Draw 1/3 Impairment: Significant.   Vasquez Copy 15/17 Impairment: Mild to Normal.   Overlapping Images - Update 12/12 Within Normal Limits.   Picture Synthesis 3/3 Within Normal Limits.   Attention    Orientation-10 10/10 Within Normal Limits.   Orientation-6 6/6 Within Normal Limits.   Alternating Sequence 1/1 Within Normal Limits.   Digit Span Forwards 8 Within Normal Limits.   Language    Repetition-1 1/1 Within Normal Limits.   Naming-2 2/2 Within Normal Limits.   Following written command 1/1 Within Normal Limits.   Writing a complete sentence 1/1 Within Normal Limits.   Naming-3 3/3 Within Normal Limits.   Repetition-2 2/2 Within Normal Limits.   Abstraction 2/2 Within Normal Limits.   15-Item BNT 15/15 Within Normal Limits.   Repetition of Phrases 5/5 Within Normal Limits.   Verbal Agility 6/6 Within Normal Limits.   SYDBAT - Semantic Association 30/30 Within Normal Limits.   Repeat & Point - Nonfluent 10/10 Within Normal Limits.   Repeat & Point - Semantics 10/10 Within Normal Limits.   Neurocognitive Focused Evaluation Aggregate Score(s)    MMSE 26/30 MMSE Score suggestive of normal to questionable cognitive impairment.   MOCA 20/30 MOCA Score suggestive of mild cognitive impairment.   Neuropsychiatric/Behavioral Focused Evaluation Assessment    BEHAV5+ 5/6 See ROS section for a full description   Laboratories:     Lab Date Value [Reference]   Metabolic Screening           Albumin grams/dl 2022, Apr-13 2022, Mar-17  2022, Feb-17    4.32 [3.35 - 5.55 g/dL]  4.53 [3.35 - 5.55 g/dL]  4.21 [3.35 - 5.55 g/dL]      Hemoglobin A1C External 2022, Mar-17  2022, Jan-20 2021, Nov-15    5.7 (H) [4.0 - 5.6 %]  8.1 (H) [4.0 - 5.6 %]  12.5 (H) [4.0 - 5.6 %]      Lactate, [place] 2021, Dec-05    1.2 [0.5 - 2.2 mmol/L]      Procalcitonin 2021, Dec-05     0.11      Protein, Serum 2022, Apr-13 2022, Mar-17  2022, Feb-17    7.2 [6.0 - 8.4 g/dL]  7.7 [6.0 - 8.4 g/dL]  7.1 [6.0 - 8.4 g/dL]      Cholesterol 2021, Nov-15    198 [120 - 199 mg/dL]      HDL 2021, Nov-15    69 [40 - 75 mg/dL]      Non-HDL Cholesterol 2021, Nov-15    129 [mg/dL]      Triglycerides 02/22/2022  235 (H) [30 - 150 mg/dL]      Thiamine 02/22/2022  23 (L) [38 - 122 ug/L]      Vitamin B-12 2022, Feb-22    258 [210 - 950 pg/mL]      Cerebrospinal Fluid Assessment   IgG 12/07/2021  1061 [650 - 1600 mg/dL]  1022 [650 - 1600 mg/dL]  932 [650 - 1600 mg/dL]      Neuroendocrine/Electrolyte Screening   Magnesium 11/08/2021  1.6 [1.6 - 2.6 mg/dL]  1.6 [1.6 - 2.6 mg/dL]  1.1 (L) [1.6 - 2.6 mg/dL]      Phosphorus 2021, Dec-07  2021, Dec-06  2021, Nov-15    3.2 [2.7 - 4.5 mg/dL]  3.7 [2.7 - 4.5 mg/dL]  2.5 (L) [2.7 - 4.5 mg/dL]      Infectious Disease/Immunocompromised Screening   SARS-CoV-2 RNA, Amplification, Qual 2021, Dec-05    Negative           Neuroimaging:    MRI brain/head with and without contrast on 2/3/2022  Formal interpretation by Radiology:  Multifocal areas of remote parenchymal hemorrhage similar to prior exam. Distribution primarily suggestive of chronic hypertension, but few peripheral cerebral foci do raise the possibility of underlying cerebral amyloid angiopathy in a patient of this age. Mild chronic microvascular ischemic change without evidence of recent or remote major vascular distribution infarct. 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  Independently reviewed radiological imaging by Emanuel Tyler MD. MPH. Behavioral Neurologist  T1: mild dorsal predominant cortical atrophy without clear frontal component or midline component. The does appear to be mild posterior parietal atrophy but not clearly in a pattern suggestive of Alzheimer's disease. Bilateral hippocampi are largely spared. No significant subcortical atrophy pattern or  hydrocephalus.  T2/FLAIR: Mild-to-moderate greater degree of subcortical strokes in the bilateral basal ganglia with bilateral periventricular capping. Multifocal basal ganglia infarcts suggestive of hypertensive microvascular disease.  DWI/ADC: No Significant DWI hyperintensities/hypointensities. No ADC correlation.  SWI/GRE: Multiple foci of susceptibility artifact with focal volume loss in keeping with areas of prior hemorrhage. 2 cm focus in the posterior aspect of the left superior frontal gyrus, 2 cm focus in the posteroinferior right temporal lobe. Additional small left thalamic and right putaminal hemorrhages. Scattered punctate foci of prior hemorrhage in right thalamus, bilateral basal ganglia, right cerebellum with a few peripheral cerebral lesions.  Impression: : Mild generalized cortical atrophy with slight posterior parietal lobule predominance however no atrophies strongly suggestive of any specific neuro degenerative condition. Multifocal areas of remote parenchymal hemorrhage similar to prior exam. 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 Lesion likely primarily supplied via a distal right BETHANIE branches with superficial cortical venous drainage - likely explains atypical distribution of subcortical ICH.     Procedures:    Electrocardiogram on 12/5/2021  Formal interpretation:  Vent. Rate : 098 BPM     Atrial Rate : 098 BPM    P-R Int : 154 ms          QRS Dur : 090 ms     QT Int : 376 ms       P-R-T Axes : 041 049 037 degrees    QTc Int : 480 ms Sinus rhythm with Premature atrial complexes Otherwise normal ECG  Independently reviewed Electrocardiogram by Emanuel Tyler MD. MPH. Behavioral Neurologist  Impression: : Received ECG has no evidence of sinus node disease. HR (>=50-60). Prolonged AZ interval (>0.22 s). Broad QRS complex (> 0.12 s).     Clinical Summary:     The patient is a 73-year-old right-handed female with a relevant past medical history of CVA, HLD, HTN, multiple cancers  (breast cancer, MM s/p chemo and transplant), PAD, MDD, budd-chiari syndrome, DMII and COPD, who presents reporting a 5-year history of fluctuating neurocognitive impairment.       The clinical history is suggestive of:  Memory Impairment: STM encoding impairment, LTM encoding-retrieval impairment, Amnesia of fixation  Attention Impairment: Attention, Alertness, Selective attention, Sustained attention, Shifting attention  Executive Impairment: Energization, Working Memory  Language Impairment: Language Dysfunction  Visuospatial Impairment: Allocentric Spatial Processing  Motor/Coordination Impairment: Sensory motor integration, Motor weakness, Central pattern generators dysfunction  Sensory Impairment: Sensory Deprivation  Behavior Impairment: Response Inhibition, Neurovegetative, Emotional Regulation  Psychiatric Impairment: Social Coherence, Paranoia, Fixed-False Beliefs, Hallucinations, Neuroleptic Sensitivity  Medical Review of Systems Impairment: Limbic Dysfunction  iADL Impairment: [place] Instrumental Activities of Daily Living Scale  The neurological examination is significant for:  Cerebellar Dysfunction: dysmetria UE, dysdiadochokinesia, truncal ataxia (walking)  Cortical Frontal Dysfunction: non-fluent aphasia (fluency), agrammatic aphasia (syntax comprehension)  Cortical Frontal-Parietal Disconnection: apraxia (ideomotor, limb-motor)  Cortical Temporal Dysfunction: anomic aphasia (spontaneous, confrontational)  Cortical Temporal-Parietal Dysfunction: logopenic aphasia (phrase phonological loop), dysgraphia  Cortical Transcallosal Disconnection: interhemispheric motor control (interhemispheric motor control ), motor efference (motor overflow)  Executive Impairment: serial processing, thought disorder  Motor Coordination: gait imbalance (tiptoes)  Movement Disorder (Gait): strength (difficulty rising), abnormal features (stance, speed, stride/cycle, abnormal swing, difficulty turning), gait syndrome  (rigid-akinetic, sensory ataxia)  Movement Disorder (Hyperkinetic): dyskinetic movements, choreiform movements, akathisia  Movement Disorder (Hypokinetic): parkinsonism (diminished facial expression, bradykinesia), paratonia (tone), dyskinesia (dysrhythmia)  Movement Disorder (Ocular): abnormal ocular movement (abnormal saccades), eyelid apraxia  Movement Disorder (Speech): abnormal vocal features (volume, rate, respiration, articulation, prosody), AMR/Dysdiadochokinesia (multisyllabic)  Sensory Dysfunction: peripheral (A? fibers)  The neurocognitive battery is significant (based on age and education) for:  Amnestic predominant multidomain major cognitive impairment  MMSE 26/30: MMSE Score suggestive of normal to questionable cognitive impairment.  MOCA 20/30: MOCA Score suggestive of mild cognitive impairment.  BEHAV5+ 5/6: See ROS section for a full description  The neurologically relevant imaging is significant for  MRI brain/head with and without contrast (2/3/2022): Mild generalized cortical atrophy with slight posterior parietal lobule predominance however no atrophies strongly suggestive of any specific neuro degenerative condition. Multifocal areas of remote parenchymal hemorrhage similar to prior exam. 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 Lesion likely primarily supplied via a distal right BETHANIE branches with superficial cortical venous drainage - likely explains atypical distribution of subcortical ICH.        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     The patient's clinical presentation meets the criteria for Mild Cognitive Impairment (MCI-ADRC) (Tai MS, et al. 2011 Alzheimer's & Dementia).     Concern regarding an intraindividual change in cognition  Impairment in one or more  cognitive domains  Preservation of independence in functional abilities.  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     Evidence of dementia.  Evidence of significant and/pr strategic cerebrovascular disease  Fluctuating, stepwise progression of cognitive deficits.  The early presence of gait disturbance (small-step gait or marche a petits pas, or magnetic, apraxic-ataxic or parkinsonian gait), unsteadiness, and frequent, unprovoked falls.  Pseudobulbar palsy  Personality and mood changes, abulia, depression, emotional incontinence, or other subcortical deficits including psychomotor retardation and abnormal executive function.     Conflicting with these observations are the gradual progressive decline since 2017 and new onset hallucinations since 2019 setting of chemotherapy for multiple myeloma.  Patient technically has sufficient clinical symptoms to suggest comorbid Lewy body disease ( fluctuating level of arousal and vivid well-formed hallucinations)  however symptoms may easily be explained by thalamic dysfunction due to known AVM and prior hemorrhage.    The pathology underlying the patient's neurocognitive impairment is likely a mixture of pathologies (Alzheimer's Disease Related Pathology, Lewy body disease/alpha-synucleinopathy, Vascular Contributions to Cognitive Impairment and Dementia). The patient has at minimum to known strategic strokes of the prefrontal cortex and thalamus sufficient for most prominent motor and cognitive deficits however since 2017 the patient has had a gradual progressive decline in memory attention concentration. Since 2019 in the setting of ongoing chemotherapy her family reporting increasing behavioral disturbances in vivid well-formed hallucinations. Patient's thalamic outflow tremor/ choreiform movements confounds an effective assessment bradykinesia  and parkinsonism  however the patient does have significant fluctuation of low arousal and the did well formed hallucinations to suggest early signs of a comorbid Lewy body disease. 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 Possible mimics of Lewy body disease would include peduncular hallucinosis.    The observations made above, were discussed with the patient and her family. We recommend screening for reversible causes of cognitive impairment with serum laboratories. We have discussed opportunities for biomarker testing (CSF Hunter biomarkers, IDEAs Amyloid-PET, Syn-One skin biopsy)   Such as skin biopsy to confirm the presence of Lewy body disease. We have placed referrals to sleep medicine due to signs and symptoms suggestive of sleep apnea to help maintain brain health.        Care Management Plan:     #Diagnostic Workup:   Laboratories: Hcy, Free T4, TSH, B9, B12, MMA, RPR  #Neurocognitive Disorder Treatment:  Following ECG will consider trial of donepezil  We have discussed opportunities for further testing with CSF biomarkers or Amyloid-PET - we have discussed skin biopsy ro r/o alpha-syn   We have discussed opportunities for further testing with [employer] genetic testing  No indication for memantine at this time  We have referred to Neuro PT  #Insomnia Treatment:  We have made referrals to Sleep medicine for SXS suggest of ZOE  #Behavioral/Environmental Treatment  We recommend engaging in activities that stimulate cognitively and socially while avoiding excessive stimulation and fatigue in overwhelmingly complex situations.  We recommend integrating routine and schedule into your daily life. https://www.alzheimersproject.org/news/the-importance-of-routine-and-familiarity-to-persons-with-dementia/  #Health Maintenance/Lifestyle Advice  We have discussed the value in aggressively controlling vascular risk factors like hypertension, hyperlipidemia, and Diabetes SBP<130, LDL<100, A1C<7.0.  We  "discussed the need to optimize lifestyle choices including a heart-healthy diet (e.g., Mediterranean or DASH), increased cardiovascular exercise (goal 150 minutes of moderate-intensity per week), and stay cognitively and socially active.  #Support  We all need support sometimes. Get easy access to local resources, community programs, and services. https://www.communityresourcefinder.org/  Learn more about Cognitive Impairment in [place]: https://www.alz.org/professionals/public-health/state-overview/[place]  #Safety  [place] has no laws against driving with dementia specifically but obviously has laws about medical conditions which impact a person's ability to drive safely. If you believe your loved one's driving capacity has diminished, please reach out to either your primary care physician or our office to discuss driving restrictions or revocation of their license. To learn more: https://www.dementiacarecentral.com/caregiverinfo/driving-problems/  The Alzheimer's Association administers the nationwide "Safe Return" program with identification bracelets, necklaces, or clothing tags and 24-hour assistance. More information is available online at https://www.alz.org/help-support/caregiving/safety/medicalert-with-24-7-wandering-support  #Follow up:  Follow-up in 4 weeks (Mar 2023).    Thank you for allowing us to participate in the care of your patient. Please do not hesitate to contact us with any questions or concerns.     It was a pleasure seeing The patient and we look forward to seeing them at their follow-up visit.     This note is dictated on M*Modal Fluency Direct word recognition program. There are word recognition mistakes that are occasionally missed on review.         Scheduled Follow-up :  Future Appointments   Date Time Provider Department Center   2/10/2023 10:20 AM [facility] LAB BMT [facility] LAB[facility] Monge   2/24/2023  8:00 AM [facility] LAB BMT [facility] LABB[facility] Pedro Monge   2/24/2023  9:00 AM Adina RAMIREZ" KISHOR [facility] HC BMT Pedro Monge   2/24/2023 10:00 AM NURSE 9, [facility] CHEMO [facility] CHEMO Pedro Monge   4/18/2023  8:00 AM Emanuel Arevalo Jr., MD [facility] Olive[facility] PCW       After Visit Medication List :     Medication List            Accurate as of February 3, 2023 11:18 AM. If you have any questions, ask your nurse or doctor.                CONTINUE taking these medications      acyclovir 400 MG tablet  Commonly known as: ZOVIRAX  Take 1 tablet (400 mg total) by mouth 2 (two) times daily.     albuterol 1.25 mg/3 mL Nebu  Commonly known as: ACCUNEB  Take 3 mLs (1.25 mg total) by nebulization every 6 (six) hours as needed (wheeze/cough). Rescue     amLODIPine 5 MG tablet  Commonly known as: NORVASC  TAKE 1 TABLET BY MOUTH EVERY DAY     aspirin 81 MG EC tablet  Commonly known as: ECOTRIN  TAKE 1 TABLET BY MOUTH EVERY DAY     cholecalciferol (vitamin D3) 125 mcg (5,000 unit) Tab     DARZALEX FASPRO subcutaneous injection  Generic drug: daratumumab-hyaluronidase-fihj     ENGERIX-B (PF) 20 mcg/mL Syrg  Generic drug: hepatitis B virus vacc.rec(PF)     ferrous gluconate 324 mg (37.5 mg iron) Tab tablet     gabapentin 300 MG capsule  Commonly known as: NEURONTIN  Take 1 capsule (300 mg total) by mouth 2 (two) times daily.     GADAVIST 1 mmol/mL (604.72 mg/mL) Soln injection  Generic drug: gadobutroL     metFORMIN 1000 MG tablet  Commonly known as: GLUCOPHAGE  Take 1 tablet (1,000 mg total) by mouth 2 (two) times daily with meals.     omeprazole 40 MG capsule  Commonly known as: PRILOSEC  TAKE 1 CAPSULE BY MOUTH EVERY DAY     OXYGEN-AIR DELIVERY SYSTEMS MISC     pomalidomide 3 mg Cap  TAKE 1 CAPSULE BY MOUTH ONCE DAILY ON DAYS 1-21 OF 28 DAY CYCLE..     potassium chloride SA 20 MEQ tablet  Commonly known as: K-DUR,KLOR-CON     PRALUENT PEN 75 mg/mL Pnij  Generic drug: alirocumab  Inject 1 mL (75 mg total) into the skin every 14 (fourteen) days.     valsartan 160 MG tablet  Commonly known as: DIOVAN  Take 1 tablet (160 mg  total) by mouth once daily.     zoledronic 4 mg/100 mL Pgbk infusion     zoledronic acid 4 mg/5 mL injection  Commonly known as: ZOMETA              Signing Physician:  Emanuel Drummond MD    Billing:        -----------------------------------------------------------------------------    I spent a total of 90 minutes (time-in: 11:00 AM; time-out: 12:30 PM) on 2023-02-03, in person face-to-face with the patient and caregiver(s), >50% of that time was spent counseling regarding the symptoms, treatment plan, risks, therapeutic options, lifestyle modifications, and/or safety issues for the diagnoses above.    10/14 Review of Systems completed and is negative except as stated above in HPI (Systems reviewed: Const, Eyes, ENT, Resp, CV, GI, , MSK, Skin, Neuro)    I reviewed previous labs for a total of 5 minutes on 2023-02-03. This is directly related to the face-to-face encounter. Review of previous labs was performed all negative except as stated above in HPI    I reviewed previous diagnostic testing for a total of 5 minutes on 2023-02-03. This is directly related to the face-to-face encounter. A review of previous diagnostic testing was performed was noted to be within normal limits except as is stated above in HPI    I performed a neurobehavioral status examination that included a clinical assessment of thinking, reasoning, and judgment. Please see above HPI and ROS for full details. This exam was performed on 2023-02-03 and included 11 minutes spent on direct face-to-face clinical observation and interview with the patient and 23 minutes spent interpreting test results and preparing the report. The total time of 34 minutes spent on the neurobehavioral status examination is not included in the time spent on evaluation and management coding.    I performed a neuropsychological evaluation that included the application of a series of standardized neurocognitive tests. Please see the informal neuropsychological assessment  above for full details. This evaluation was performed on 2023-02-03 and included 12 minutes spent on direct face-to-face clinical standardized test administration with the patient and 21 minutes spent on interpreting standardized test results, integrating patient data into a treatment plan, and providing feedback to the patient and caregiver. The total time of 33 minutes spent on the neuropsychological evaluation is not included in the time spent on evaluation and management coding.    Total Billing time spent on encounter/documentation for this patient's evaluation and management, not including the neurobehavioral status examination and neuropsychological evaluation: 77 minutes.

## 2023-02-07 DIAGNOSIS — Z00.00 ENCOUNTER FOR MEDICARE ANNUAL WELLNESS EXAM: ICD-10-CM

## 2023-02-09 DIAGNOSIS — Z00.00 ENCOUNTER FOR MEDICARE ANNUAL WELLNESS EXAM: ICD-10-CM

## 2023-02-10 ENCOUNTER — LAB VISIT (OUTPATIENT)
Dept: LAB | Facility: HOSPITAL | Age: 73
End: 2023-02-10
Payer: MEDICARE

## 2023-02-10 ENCOUNTER — HOSPITAL ENCOUNTER (OUTPATIENT)
Dept: CARDIOLOGY | Facility: CLINIC | Age: 73
Discharge: HOME | End: 2023-02-10
Payer: MEDICARE

## 2023-02-10 DIAGNOSIS — F01.A18 MILD VASCULAR DEMENTIA WITH OTHER BEHAVIORAL DISTURBANCE: ICD-10-CM

## 2023-02-10 DIAGNOSIS — C90.00 MULTIPLE MYELOMA, REMISSION STATUS UNSPECIFIED: ICD-10-CM

## 2023-02-10 LAB
ANISOCYTOSIS BLD QL SMEAR: SLIGHT
BASOPHILS # BLD AUTO: 0.1 K/UL (ref 0–0.2)
BASOPHILS NFR BLD: 3.8 % (ref 0–1.9)
DIFFERENTIAL METHOD: ABNORMAL
EOSINOPHIL # BLD AUTO: 0.3 K/UL (ref 0–0.5)
EOSINOPHIL NFR BLD: 10.9 % (ref 0–8)
ERYTHROCYTE [DISTWIDTH] IN BLOOD BY AUTOMATED COUNT: 23.2 % (ref 11.5–14.5)
HCT VFR BLD AUTO: 27.9 % (ref 37–48.5)
HGB BLD-MCNC: 7.5 G/DL (ref 12–16)
HYPOCHROMIA BLD QL SMEAR: ABNORMAL
IMM GRANULOCYTES # BLD AUTO: 0 K/UL (ref 0–0.04)
IMM GRANULOCYTES NFR BLD AUTO: 0 % (ref 0–0.5)
LYMPHOCYTES # BLD AUTO: 0.9 K/UL (ref 1–4.8)
LYMPHOCYTES NFR BLD: 35.3 % (ref 18–48)
MCH RBC QN AUTO: 19.7 PG (ref 27–31)
MCHC RBC AUTO-ENTMCNC: 26.9 G/DL (ref 32–36)
MCV RBC AUTO: 73 FL (ref 82–98)
MONOCYTES # BLD AUTO: 0.6 K/UL (ref 0.3–1)
MONOCYTES NFR BLD: 20.7 % (ref 4–15)
NEUTROPHILS # BLD AUTO: 0.8 K/UL (ref 1.8–7.7)
NEUTROPHILS NFR BLD: 29.3 % (ref 38–73)
NRBC BLD-RTO: 0 /100 WBC
OVALOCYTES BLD QL SMEAR: ABNORMAL
PLATELET # BLD AUTO: 229 K/UL (ref 150–450)
PLATELET BLD QL SMEAR: ABNORMAL
PMV BLD AUTO: ABNORMAL FL (ref 9.2–12.9)
POIKILOCYTOSIS BLD QL SMEAR: ABNORMAL
RBC # BLD AUTO: 3.8 M/UL (ref 4–5.4)
SCHISTOCYTES BLD QL SMEAR: ABNORMAL
SCHISTOCYTES BLD QL SMEAR: PRESENT
TARGETS BLD QL SMEAR: ABNORMAL
WBC # BLD AUTO: 2.66 K/UL (ref 3.9–12.7)

## 2023-02-10 PROCEDURE — 93005 EKG 12-LEAD: ICD-10-PCS | Mod: HCNC,S$GLB,, | Performed by: PSYCHIATRY & NEUROLOGY

## 2023-02-10 PROCEDURE — 85025 COMPLETE CBC W/AUTO DIFF WBC: CPT | Mod: 91,HCNC | Performed by: NURSE PRACTITIONER

## 2023-02-10 PROCEDURE — 93005 ELECTROCARDIOGRAM TRACING: CPT | Mod: HCNC,S$GLB,, | Performed by: PSYCHIATRY & NEUROLOGY

## 2023-02-10 PROCEDURE — 93010 ELECTROCARDIOGRAM REPORT: CPT | Mod: HCNC,S$GLB,, | Performed by: INTERNAL MEDICINE

## 2023-02-10 PROCEDURE — 93010 EKG 12-LEAD: ICD-10-PCS | Mod: HCNC,S$GLB,, | Performed by: INTERNAL MEDICINE

## 2023-02-10 PROCEDURE — 36415 COLL VENOUS BLD VENIPUNCTURE: CPT | Mod: HCNC | Performed by: NURSE PRACTITIONER

## 2023-02-13 RX ORDER — EVOLOCUMAB 140 MG/ML
140 INJECTION, SOLUTION SUBCUTANEOUS
Qty: 6 ML | Refills: 3 | Status: SHIPPED | OUTPATIENT
Start: 2023-02-13 | End: 2024-01-11 | Stop reason: SDUPTHER

## 2023-02-14 ENCOUNTER — SPECIALTY PHARMACY (OUTPATIENT)
Dept: PHARMACY | Facility: CLINIC | Age: 73
End: 2023-02-14
Payer: MEDICARE

## 2023-02-14 NOTE — TELEPHONE ENCOUNTER
Repatha PA submitted via [facility] [id number]    [place], this is Concepción Flood with [facility].  We are working on your prescription that your doctor has sent us. We will be working with your insurance to get this approved for you. We will be calling you along the way with updates on your medication.  If you have any questions, you can reach us at [phone].    Welcome call outcome: Patient/caregiver reached

## 2023-02-15 NOTE — TELEPHONE ENCOUNTER
BI: COMPLETE     MEDICARE PLAN: [employer]  Estimated copay: $29.47/28 days - $88.37/84 days  Benefits Stage: Catastrophic Coverage  In Network: yes  PA approval on file: 2/14/23 to 8/13/23  LIS level: none    Forwarding to  for [facility] noreen renewal.

## 2023-02-16 DIAGNOSIS — C90.00 MULTIPLE MYELOMA, REMISSION STATUS UNSPECIFIED: ICD-10-CM

## 2023-02-16 NOTE — TELEPHONE ENCOUNTER
Repatha hypercholesterolemia [facility] noreen secured  [facility] ID [id number]  Dates: 1/17/23 to 1/16/24  Amount awarded $2500    BIN [id number]  JUANITO [id number]  ID [id number]  [facility] [id number]    Pending initial consult. Switch from [employer].

## 2023-02-16 NOTE — TELEPHONE ENCOUNTER
----- Message from Saumya Rebolledo sent at 2/16/2023  8:33 AM CST -----  RX Name and Strength: pomalidomide 3 mg Cap    How is the patient currently taking it? TAKE 1 CAPSULE BY MOUTH ONCE DAILY ON DAYS 1-21 OF 28 DAY CYCLE..    Quantity? 21 capsules         Preferred Pharmacy with phone number:     [facility] - [place], [place] - [address]  [address] [address]  Phone: [phone] Fax: [phone]    Ordering Provider: Noman         Contact Preference: [phone]

## 2023-02-17 ENCOUNTER — TELEPHONE (OUTPATIENT)
Dept: NEUROLOGY | Facility: CLINIC | Age: 73
End: 2023-02-17

## 2023-02-17 ENCOUNTER — SPECIALTY PHARMACY (OUTPATIENT)
Dept: PHARMACY | Facility: CLINIC | Age: 73
End: 2023-02-17
Payer: MEDICARE

## 2023-02-17 DIAGNOSIS — E53.8 B12 DEFICIENCY: Primary | ICD-10-CM

## 2023-02-17 RX ORDER — ACETAMINOPHEN, DIPHENHYDRAMINE HCL, PHENYLEPHRINE HCL 325; 25; 5 MG/1; MG/1; MG/1
TABLET ORAL
Qty: 30 TABLET | Refills: 3 | Status: SHIPPED | OUTPATIENT
Start: 2023-02-17

## 2023-02-17 NOTE — TELEPHONE ENCOUNTER
Specialty Pharmacy - Initial Clinical Assessment    Specialty Medication Orders Linked to Encounter      Flowsheet Row Most Recent Value   Medication #1 evolocumab (REPATHA SURECLICK) 140 mg/mL PnIj (Order#[id number], Rx#[id number])          Patient Diagnosis   E11.69, E78.5 - Hyperlipidemia associated with type 2 diabetes mellitus    Subjective    Shelby Thompson is a 72 y.o. female, who is followed by the specialty pharmacy service for management and education.    Recent Encounters       Date Type Provider Description    02/17/2023 Specialty Pharmacy Concepción Barry PharmD Initial Clinical Assessment    02/14/2023 Specialty Pharmacy Concepción Barry PharmD Referral Authorization    01/30/2023 Specialty Pharmacy Jenifer Bray Refill Coordination    11/07/2022 Specialty Pharmacy Shelli Negron PharmD Refill Coordination    10/14/2022 Specialty Pharmacy Bobby Paniagua PharmD Refill Coordination          Clinical call attempts since last clinical assessment   No call attempts found.     Current Outpatient Medications   Medication Sig    acyclovir (ZOVIRAX) 400 MG tablet Take 1 tablet (400 mg total) by mouth 2 (two) times daily.    albuterol (ACCUNEB) 1.25 mg/3 mL Nebu Take 3 mLs (1.25 mg total) by nebulization every 6 (six) hours as needed (wheeze/cough). Rescue    amLODIPine (NORVASC) 5 MG tablet TAKE 1 TABLET BY MOUTH EVERY DAY    aspirin (ECOTRIN) 81 MG EC tablet TAKE 1 TABLET BY MOUTH EVERY DAY    cholecalciferol, vitamin D3, 125 mcg (5,000 unit) Tab 1 tablet DAILY (route: oral)    cyanocobalamin, vitamin B-12, 5,000 mcg Subl Place one under tongue once a day for 1 month, then continue to take under tongue per week    DARZALEX FASPRO 1,800 mg-30,000 unit/15 mL Soln     ENGERIX-B, PF, 20 mcg/mL Syrg     evolocumab (REPATHA SURECLICK) 140 mg/mL PnIj Inject 1 mL (140 mg total) into the skin every 14 (fourteen) days.    ferrous gluconate 324 mg (37.5 mg iron) Tab tablet 1 tablet DAILY (route: oral)     gabapentin (NEURONTIN) 300 MG capsule Take 1 capsule (300 mg total) by mouth 2 (two) times daily.    GADAVIST 1 mmol/mL (604.72 mg/mL) Soln injection     metFORMIN (GLUCOPHAGE) 1000 MG tablet Take 1 tablet (1,000 mg total) by mouth 2 (two) times daily with meals.    omeprazole (PRILOSEC) 40 MG capsule TAKE 1 CAPSULE BY MOUTH EVERY DAY    OXYGEN-AIR DELIVERY SYSTEMS MISC 1-2 Liter O2 - CONTINUOUS (route: Oxygen)    pomalidomide 3 mg Cap TAKE 1 CAPSULE BY MOUTH ONCE DAILY ON DAYS 1-21 OF 28 DAY CYCLE..    potassium chloride SA (K-DUR,KLOR-CON) 20 MEQ tablet     valsartan (DIOVAN) 160 MG tablet Take 1 tablet (160 mg total) by mouth once daily.    zoledronic 4 mg/100 mL PgBk infusion     zoledronic acid (ZOMETA) 4 mg/5 mL injection    Last reviewed on 2/3/2023 11:44 AM by Emanuel Drummond MD    Review of patient's allergies indicates:   Allergen Reactions    Lipitor [atorvastatin] Itching     Itching, elevated cpk, muscle aches, statin induced myositis   Last reviewed on  2/16/2023 8:44 AM by Karina Ortega    Drug Interactions    Drug interactions evaluated: no  Clinically relevant drug interactions identified: no  Provided the patient with educational material regarding drug interactions: not applicable         Adverse Effects    Unable to perform a full ROS: Yes   Reason: other          Assessment Questions - Documented Responses      Flowsheet Row Most Recent Value   Assessment    Medication Reconciliation completed for patient No   Goals of Therapy Status Discussed (new start)   Status of the patients ability to self-administer: Is Able   All education points have been covered with patient? No, patient declined- printed education provided   Welcome packet contents reviewed and discussed with patient? Yes   Assesment completed? Yes   Plan Therapy being initiated   Do you need to open a clinical intervention (i-vent)? No   Do you want to schedule first shipment? Yes   Medication #1 Assessment Info    Patient status  New medication, Exisiting to [facility]   Is this medication appropriate for the patient? Yes   Is this medication effective? Not yet started          Refill Questions - Documented Responses      Flowsheet Row Most Recent Value   Patient Availability and HIPAA Verification    Does patient want to proceed with activity? Yes   HIPAA/medical authority confirmed? Yes   Relationship to patient of person spoken to? Self   Refill Screening Questions    When does the patient need to receive the medication? 02/20/23   Refill Delivery Questions    How will the patient receive the medication? [facility]   When does the patient need to receive the medication? 02/20/23   Shipping Address Home   Address in [facility] confirmed and updated if neccessary? Yes   Expected Copay ($) 0   Is the patient able to afford the medication copay? Yes   Payment Method zero copay   Days supply of Refill 84   Supplies needed? No supplies needed   Refill activity completed? Yes   Refill activity plan Refill scheduled   Shipment/Pickup Date: 02/20/23            Objective    She has a past medical history of Acute respiratory failure with hypoxia (4/30/2019), FUENTES (acute kidney injury) (5/1/2019), Allergy, Anemia, Aortic aneurysm, Breast cancer (10/2011), Cataract, Chronic diastolic congestive heart failure (11/6/2015), Colon polyp, Community acquired pneumonia of right lower lobe of lung (8/3/2021), GERD (gastroesophageal reflux disease), Hiatal hernia, History of colonic polyps, psychiatric care, breast cancer, Hyperlipemia, Hypertension, ICH (intracerebral hemorrhage), Immunocompromised (10/28/2022), Major depressive disorder, single episode, mild (6/23/2016), Nuclear sclerosis (7/21/2014), 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 (7/21/2014), PAD (peripheral artery disease) (11/6/2015), Psychiatric problem, Statin-induced rhabdomyolysis, Stroke (4/2011), Type 2 diabetes mellitus with diabetic peripheral angiopathy without  "gangrene, with long-term current use of insulin (3/31/2021), Type 2 diabetes mellitus without complication, without long-term current use of insulin (2/10/2020), and Walker as ambulation aid.      BP Readings from Last 4 Encounters:   02/03/23 122/78   01/27/23 126/71   01/27/23 (!) 126/58   01/17/23 98/78     Ht Readings from Last 4 Encounters:   02/03/23 5' 3" (1.6 m)   01/27/23 5' 3" (1.6 m)   01/17/23 5' 3" (1.6 m)   12/22/22 5' 3" (1.6 m)     Wt Readings from Last 4 Encounters:   02/03/23 65.8 kg (145 lb)   01/27/23 65.3 kg (143 lb 15.4 oz)   01/17/23 67 kg (147 lb 11.3 oz)   12/22/22 66.2 kg (145 lb 13.4 oz)       The goals of prescribed drug therapy management include:  Supporting patient to meet the prescriber's medical treatment objectives  Improving or maintaining quality of life  Maintaining optimal therapy adherence  Minimizing and managing side effects      Goals of Therapy Status: Discussed (new start)    Assessment/Plan  Patient plans to start therapy on 02/20/23      Indication, dosage, appropriateness, effectiveness, safety and convenience of her specialty medication(s) were reviewed today.     Patient Education   Pharmacist offer to  patient was declined. Printed educational materials will be provided with medication.  Patient did accept verbal education on the following topics:    Switch from Praluent    Tasks added this encounter   No tasks added.   Tasks due within next 3 months   2/16/2023 - Clinical - Initial Assessment     Concepción Barry, PharmD  [facility]  [address] [place] [id number]  Phone: [phone]  Fax: [phone]  "

## 2023-02-24 ENCOUNTER — INFUSION (OUTPATIENT)
Dept: INFUSION THERAPY | Facility: HOSPITAL | Age: 73
End: 2023-02-24
Attending: INTERNAL MEDICINE
Payer: MEDICARE

## 2023-02-24 ENCOUNTER — OFFICE VISIT (OUTPATIENT)
Dept: HEMATOLOGY/ONCOLOGY | Facility: CLINIC | Age: 73
End: 2023-02-24
Payer: MEDICARE

## 2023-02-24 VITALS
WEIGHT: 146.63 LBS | SYSTOLIC BLOOD PRESSURE: 144 MMHG | OXYGEN SATURATION: 97 % | BODY MASS INDEX: 25.98 KG/M2 | RESPIRATION RATE: 16 BRPM | DIASTOLIC BLOOD PRESSURE: 67 MMHG | HEART RATE: 64 BPM | HEIGHT: 63 IN

## 2023-02-24 VITALS
BODY MASS INDEX: 25.98 KG/M2 | HEART RATE: 64 BPM | DIASTOLIC BLOOD PRESSURE: 67 MMHG | RESPIRATION RATE: 16 BRPM | SYSTOLIC BLOOD PRESSURE: 144 MMHG | WEIGHT: 146.63 LBS | HEIGHT: 63 IN

## 2023-02-24 DIAGNOSIS — C90.01 MULTIPLE MYELOMA IN REMISSION: Primary | ICD-10-CM

## 2023-02-24 DIAGNOSIS — Z94.84 HISTORY OF AUTO STEM CELL TRANSPLANT: ICD-10-CM

## 2023-02-24 DIAGNOSIS — D50.9 IRON DEFICIENCY ANEMIA, UNSPECIFIED IRON DEFICIENCY ANEMIA TYPE: ICD-10-CM

## 2023-02-24 DIAGNOSIS — C90.00 MULTIPLE MYELOMA, REMISSION STATUS UNSPECIFIED: Primary | ICD-10-CM

## 2023-02-24 DIAGNOSIS — C90.02 MULTIPLE MYELOMA IN RELAPSE: ICD-10-CM

## 2023-02-24 DIAGNOSIS — R19.7 DIARRHEA, UNSPECIFIED TYPE: ICD-10-CM

## 2023-02-24 PROCEDURE — 96401 CHEMO ANTI-NEOPL SQ/IM: CPT | Mod: HCNC

## 2023-02-24 PROCEDURE — 3288F PR FALLS RISK ASSESSMENT DOCUMENTED: ICD-10-PCS | Mod: HCNC,CPTII,S$GLB, | Performed by: INTERNAL MEDICINE

## 2023-02-24 PROCEDURE — 3288F FALL RISK ASSESSMENT DOCD: CPT | Mod: HCNC,CPTII,S$GLB, | Performed by: INTERNAL MEDICINE

## 2023-02-24 PROCEDURE — 1101F PT FALLS ASSESS-DOCD LE1/YR: CPT | Mod: HCNC,CPTII,S$GLB, | Performed by: INTERNAL MEDICINE

## 2023-02-24 PROCEDURE — 63600175 PHARM REV CODE 636 W HCPCS: Mod: TB,HCNC | Performed by: INTERNAL MEDICINE

## 2023-02-24 PROCEDURE — 1126F AMNT PAIN NOTED NONE PRSNT: CPT | Mod: HCNC,CPTII,S$GLB, | Performed by: INTERNAL MEDICINE

## 2023-02-24 PROCEDURE — 3078F PR MOST RECENT DIASTOLIC BLOOD PRESSURE < 80 MM HG: ICD-10-PCS | Mod: HCNC,CPTII,S$GLB, | Performed by: INTERNAL MEDICINE

## 2023-02-24 PROCEDURE — 3008F BODY MASS INDEX DOCD: CPT | Mod: HCNC,CPTII,S$GLB, | Performed by: INTERNAL MEDICINE

## 2023-02-24 PROCEDURE — 3008F PR BODY MASS INDEX (BMI) DOCUMENTED: ICD-10-PCS | Mod: HCNC,CPTII,S$GLB, | Performed by: INTERNAL MEDICINE

## 2023-02-24 PROCEDURE — 1101F PR PT FALLS ASSESS DOC 0-1 FALLS W/OUT INJ PAST YR: ICD-10-PCS | Mod: HCNC,CPTII,S$GLB, | Performed by: INTERNAL MEDICINE

## 2023-02-24 PROCEDURE — 3044F PR MOST RECENT HEMOGLOBIN A1C LEVEL <7.0%: ICD-10-PCS | Mod: HCNC,CPTII,S$GLB, | Performed by: INTERNAL MEDICINE

## 2023-02-24 PROCEDURE — 3077F SYST BP >= 140 MM HG: CPT | Mod: HCNC,CPTII,S$GLB, | Performed by: INTERNAL MEDICINE

## 2023-02-24 PROCEDURE — 3077F PR MOST RECENT SYSTOLIC BLOOD PRESSURE >= 140 MM HG: ICD-10-PCS | Mod: HCNC,CPTII,S$GLB, | Performed by: INTERNAL MEDICINE

## 2023-02-24 PROCEDURE — 99215 OFFICE O/P EST HI 40 MIN: CPT | Mod: HCNC,S$GLB,, | Performed by: INTERNAL MEDICINE

## 2023-02-24 PROCEDURE — 99999 PR PBB SHADOW E&M-EST. PATIENT-LVL IV: ICD-10-PCS | Mod: PBBFAC,HCNC,, | Performed by: INTERNAL MEDICINE

## 2023-02-24 PROCEDURE — 3078F DIAST BP <80 MM HG: CPT | Mod: HCNC,CPTII,S$GLB, | Performed by: INTERNAL MEDICINE

## 2023-02-24 PROCEDURE — 3044F HG A1C LEVEL LT 7.0%: CPT | Mod: HCNC,CPTII,S$GLB, | Performed by: INTERNAL MEDICINE

## 2023-02-24 PROCEDURE — 96409 CHEMO IV PUSH SNGL DRUG: CPT | Mod: HCNC

## 2023-02-24 PROCEDURE — 99999 PR PBB SHADOW E&M-EST. PATIENT-LVL IV: CPT | Mod: PBBFAC,HCNC,, | Performed by: INTERNAL MEDICINE

## 2023-02-24 PROCEDURE — 99215 PR OFFICE/OUTPT VISIT, EST, LEVL V, 40-54 MIN: ICD-10-PCS | Mod: HCNC,S$GLB,, | Performed by: INTERNAL MEDICINE

## 2023-02-24 PROCEDURE — 1126F PR PAIN SEVERITY QUANTIFIED, NO PAIN PRESENT: ICD-10-PCS | Mod: HCNC,CPTII,S$GLB, | Performed by: INTERNAL MEDICINE

## 2023-02-24 RX ORDER — LOPERAMIDE HYDROCHLORIDE 2 MG/1
2 CAPSULE ORAL 3 TIMES DAILY PRN
Qty: 60 CAPSULE | Refills: 2 | Status: SHIPPED | OUTPATIENT
Start: 2023-02-24 | End: 2023-03-06

## 2023-02-24 RX ORDER — EPINEPHRINE 0.3 MG/.3ML
0.3 INJECTION SUBCUTANEOUS ONCE AS NEEDED
Status: CANCELLED | OUTPATIENT
Start: 2023-02-24 | End: 2034-07-22

## 2023-02-24 RX ORDER — DIPHENHYDRAMINE HYDROCHLORIDE 50 MG/ML
50 INJECTION INTRAMUSCULAR; INTRAVENOUS ONCE AS NEEDED
Status: CANCELLED | OUTPATIENT
Start: 2023-02-24 | End: 2034-07-22

## 2023-02-24 RX ORDER — DIPHENHYDRAMINE HYDROCHLORIDE 50 MG/ML
50 INJECTION INTRAMUSCULAR; INTRAVENOUS ONCE AS NEEDED
Status: DISCONTINUED | OUTPATIENT
Start: 2023-02-24 | End: 2023-02-24 | Stop reason: HOSPADM

## 2023-02-24 RX ORDER — EPINEPHRINE 0.3 MG/.3ML
0.3 INJECTION SUBCUTANEOUS ONCE AS NEEDED
Status: DISCONTINUED | OUTPATIENT
Start: 2023-02-24 | End: 2023-02-24 | Stop reason: HOSPADM

## 2023-02-24 RX ADMIN — DARATUMUMAB AND HYALURONIDASE-FIHJ (HUMAN RECOMBINANT) 1800 MG: 1800; 30000 INJECTION SUBCUTANEOUS at 10:02

## 2023-02-24 NOTE — Clinical Note
-please schedule weekly feraheme infusions x 2 doses in next 1-2 weeks; [facility] or [facility] fine, just first available  -cbc, cmp, serum free light chains, quantitative immunoglobulins, serum electropheresis, serum immunofixation, ferritin lab and np appt on 3/24/23 at [facility]

## 2023-02-24 NOTE — PLAN OF CARE
Pt arrived to unit ambulating with daughter from MD office.  Reviewed tx plan and pt without any questions.  Pt tolerated tx without any difficulty.  Pt ambulated off unit with daughter without any questions, comments or concerns.  Return in 3 weeks for next tx.

## 2023-02-24 NOTE — PROGRESS NOTES
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  She achieved and tolerated well VRd x completing 7, 28 day cycles and achieving deep VGPR to induction. Then underwent Melphalan autologous stem cell transplant on 2/12/2020.      Extensive PMH as below.    2 prior CVAs (one in 2008, one in 2011)  L breast cancer DCIS stage 0 (s/p lumpectomy and radiation, no endocrine tx due to CVA)  HTN  DM II  Neuropathy, b/l hands  Prior smoker, quit in 2011  Hepatic lesions - vascular per recent MRI in 09 /2019 with no changes; will confirm no further rascon needed from help  Statin Intolerance - on praluent, PCSK9 inhibitor  HFpEF - EF 55%, diastolic dysfunction  Anxiety/Depression     ADMISSION SUMMARY: 2/10-2/26/2020  Admitted 2/10, tolerated chemo and transplant well. Engrafted on day +12. Remained afebrile throughout hospital stay and no mucositis. Experienced expected side effects of nausea and diarrhea controlled with antiemetics and Imodium. Discharged home with home PT/OT       Subjective:       Initial History: Ms. Thompson is a 72 y.o. female who presents for virtual follow up.  She is 3yrs 0   months post AutoSCT.  Relaped  at 1 year post sct and was on Sravanthi/zhanna/dex until summer 2022 when changed to sravanthi/pom/dex due to biohchemical only progression. Remains on sravanthi/pom/dex salvage. Tolerating dose adjusted pomalyst.   Presents with her daughter.  Some loose stools with pomalyst.  No new complaints or issues. Moderate dementia stable followingwith neuro.  No new AEs from treatment to report.    Past Medical History:   Past Medical History:   Diagnosis Date     Acute respiratory failure with hypoxia 4/30/2019    FUENTES (acute kidney injury) 5/1/2019    Allergy     Anemia     Aortic aneurysm     Breast cancer 10/2011    left breast Stage 0 DCIS    Cataract     Chronic diastolic congestive heart failure 11/6/2015    Colon polyp     Community acquired pneumonia of right lower lobe of lung 8/3/2021    GERD (gastroesophageal reflux disease)     Hiatal hernia     History of colonic polyps     Hx of psychiatric care     Zoloft    HX: breast cancer     Hyperlipemia     Hypertension     ICH (intracerebral hemorrhage)     Immunocompromised 10/28/2022    Major depressive disorder, single episode, mild 6/23/2016    Nuclear sclerosis 7/21/2014    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 7/21/2014    PAD (peripheral artery disease) 11/6/2015    Psychiatric problem     Statin-induced rhabdomyolysis     4/2015     Stroke 4/2011    Type 2 diabetes mellitus with diabetic peripheral angiopathy without gangrene, with long-term current use of insulin 3/31/2021    Type 2 diabetes mellitus without complication, without long-term current use of insulin 2/10/2020    Walker as ambulation aid        Past Surgical HIstory:   Past Surgical History:   Procedure Laterality Date    APPENDECTOMY      BONE MARROW BIOPSY Left 10/3/2019    Procedure: Biopsy-bone marrow;  Surgeon: Jere Tineo MD;  Location: [facility] OR [address] [place] [address];  Service: Oncology;  Laterality: Left;    BREAST BIOPSY Left 10/2011    BREAST LUMPECTOMY Left 2011    DCIS    COLONOSCOPY      COLONOSCOPY N/A 1/9/2020    Procedure: COLONOSCOPY;  Surgeon: Quang De La Cruz MD;  Location: [facility] (4TH FLR);  Service: Endoscopy;  Laterality: N/A;    CYST REMOVAL      back    ESOPHAGOGASTRODUODENOSCOPY  07/2016    duodenal angioectasia    ESOPHAGOGASTRODUODENOSCOPY N/A 1/9/2020    Procedure: EGD (ESOPHAGOGASTRODUODENOSCOPY);  Surgeon: Quang De La Cruz MD;  Location: [facility] ENDO (4TH FLR);  Service: Endoscopy;  Laterality:  N/A;    FOOT FRACTURE SURGERY  10/2012    right foot, with R hallux valgus repair    FRACTURE SURGERY Right     broken toe repiar    HEMORRHOID SURGERY      LIVER BIOPSY  04/2015    essentially normal, no fibrosis    TUBAL LIGATION      UPPER GASTROINTESTINAL ENDOSCOPY         Family History:   Family History   Problem Relation Age of Onset    Dementia Sister         x1 sister    Cancer Sister 63        Lung Cancer    No Known Problems Mother     Cancer Father     No Known Problems Brother     Hypertension Daughter     Fibroids Daughter         uterine    Hypertension Son     No Known Problems Brother     No Known Problems Maternal Aunt     No Known Problems Maternal Uncle     No Known Problems Paternal Aunt     No Known Problems Paternal Uncle     Hypertension Maternal Grandmother     No Known Problems Maternal Grandfather     No Known Problems Paternal Grandmother     No Known Problems Paternal Grandfather     Ovarian cancer Neg Hx     Colon cancer Neg Hx     Tremor Neg Hx     Amblyopia Neg Hx     Blindness Neg Hx     Cataracts Neg Hx     Diabetes Neg Hx     Glaucoma Neg Hx     Macular degeneration Neg Hx     Retinal detachment Neg Hx     Strabismus Neg Hx     Stroke Neg Hx     Thyroid disease Neg Hx     Esophageal cancer Neg Hx     Rectal cancer Neg Hx     Stomach cancer Neg Hx     Ulcerative colitis Neg Hx     Crohn's disease Neg Hx     Irritable bowel syndrome Neg Hx     Celiac disease Neg Hx        Social History:  reports that she quit smoking about 11 years ago. Her smoking use included cigarettes. She started smoking about 56 years ago. She has a 11.00 pack-year smoking history. She has never used smokeless tobacco. She reports that she does not currently use alcohol. She reports that she does not use drugs.    Allergies:  Review of patient's allergies indicates:   Allergen Reactions    Lipitor [atorvastatin] Itching     Itching, elevated cpk, muscle aches, statin induced myositis        Medications:  Current Outpatient Medications   Medication Sig Dispense Refill    acyclovir (ZOVIRAX) 400 MG tablet Take 1 tablet (400 mg total) by mouth 2 (two) times daily. 60 tablet 2    albuterol (ACCUNEB) 1.25 mg/3 mL Nebu Take 3 mLs (1.25 mg total) by nebulization every 6 (six) hours as needed (wheeze/cough). Rescue 75 mL 2    amLODIPine (NORVASC) 5 MG tablet TAKE 1 TABLET BY MOUTH EVERY DAY 90 tablet 2    aspirin (ECOTRIN) 81 MG EC tablet TAKE 1 TABLET BY MOUTH EVERY DAY 90 tablet 3    cholecalciferol, vitamin D3, 125 mcg (5,000 unit) Tab 1 tablet DAILY (route: oral)      cyanocobalamin, vitamin B-12, 5,000 mcg Subl Place one under tongue once a day for 1 month, then continue to take under tongue per week 30 tablet 3    DARZALEX FASPRO 1,800 mg-30,000 unit/15 mL Soln       ENGERIX-B, PF, 20 mcg/mL Syrg       evolocumab (REPATHA SURECLICK) 140 mg/mL PnIj Inject 1 mL (140 mg total) into the skin every 14 (fourteen) days. 6 mL 3    ferrous gluconate 324 mg (37.5 mg iron) Tab tablet 1 tablet DAILY (route: oral)      gabapentin (NEURONTIN) 300 MG capsule Take 1 capsule (300 mg total) by mouth 2 (two) times daily. 180 capsule 2    GADAVIST 1 mmol/mL (604.72 mg/mL) Soln injection       metFORMIN (GLUCOPHAGE) 1000 MG tablet Take 1 tablet (1,000 mg total) by mouth 2 (two) times daily with meals. 180 tablet 0    omeprazole (PRILOSEC) 40 MG capsule TAKE 1 CAPSULE BY MOUTH EVERY DAY 90 capsule 3    OXYGEN-AIR DELIVERY SYSTEMS MISC 1-2 Liter O2 - CONTINUOUS (route: Oxygen)      pomalidomide 3 mg Cap TAKE 1 CAPSULE BY MOUTH ONCE DAILY ON DAYS 1-21 OF 28 DAY CYCLE.. 21 capsule 0    potassium chloride SA (K-DUR,KLOR-CON) 20 MEQ tablet       valsartan (DIOVAN) 160 MG tablet Take 1 tablet (160 mg total) by mouth once daily. 90 tablet 3    zoledronic 4 mg/100 mL PgBk infusion       zoledronic acid (ZOMETA) 4 mg/5 mL injection        No current facility-administered medications for this visit.      See HPI     ECOG  Performance Status: 2 (due to remote  CVA)   Objective:      Vitals:   Vitals:    02/24/23 0850   BP: (!) 144/67   Pulse: 64   Resp: 16   General - somewhat frail appearing, no apparent distress  HEENT - oropharynx clear  Chest and Lung - clear to auscultation bilaterally   Cardiovascular - RRR with no MGR, normal S1 and S2  Abdomen-  soft, nontender, no palpable hepatomegaly or splenomegaly  Lymph - no palpable lymphadenopathy  Heme - no bruising, petechiae, pallor  Skin - no rashes or lesions  Psych - appropriate mood and affect       Laboratory Data:  Lab Visit on 02/24/2023   Component Date Value Ref Range Status    WBC 02/24/2023 3.85 (L)  3.90 - 12.70 K/uL Final    RBC 02/24/2023 3.80 (L)  4.00 - 5.40 M/uL Final    Hemoglobin 02/24/2023 7.7 (L)  12.0 - 16.0 g/dL Final    Hematocrit 02/24/2023 27.8 (L)  37.0 - 48.5 % Final    MCV 02/24/2023 73 (L)  82 - 98 fL Final    MCH 02/24/2023 20.3 (L)  27.0 - 31.0 pg Final    MCHC 02/24/2023 27.7 (L)  32.0 - 36.0 g/dL Final    RDW 02/24/2023 24.0 (H)  11.5 - 14.5 % Final    Platelets 02/24/2023 223  150 - 450 K/uL Final    MPV 02/24/2023 SEE COMMENT  9.2 - 12.9 fL Final    Result not available.    Immature Granulocytes 02/24/2023 0.8 (H)  0.0 - 0.5 % Final    Gran # (ANC) 02/24/2023 1.9  1.8 - 7.7 K/uL Final    Immature Grans (Abs) 02/24/2023 0.03  0.00 - 0.04 K/uL Final    Comment: Mild elevation in immature granulocytes is non specific and   can be seen in a variety of conditions including stress response,   acute inflammation, trauma and pregnancy. Correlation with other   laboratory and clinical findings is essential.      Lymph # 02/24/2023 0.9 (L)  1.0 - 4.8 K/uL Final    Mono # 02/24/2023 0.6  0.3 - 1.0 K/uL Final    Eos # 02/24/2023 0.3  0.0 - 0.5 K/uL Final    Baso # 02/24/2023 0.07  0.00 - 0.20 K/uL Final    nRBC 02/24/2023 0  0 /100 WBC Final    Gran % 02/24/2023 50.2  38.0 - 73.0 % Final    Lymph % 02/24/2023 23.1  18.0 - 48.0 % Final    Mono % 02/24/2023  16.6 (H)  4.0 - 15.0 % Final    Eosinophil % 02/24/2023 7.5  0.0 - 8.0 % Final    Basophil % 02/24/2023 1.8  0.0 - 1.9 % Final    Differential Method 02/24/2023 Automated   Final      Lab Results   Component Value Date    WBC 3.85 (L) 02/24/2023    HGB 7.7 (L) 02/24/2023    HCT 27.8 (L) 02/24/2023    MCV 73 (L) 02/24/2023     02/24/2023       Gran # (ANC)   Date Value Ref Range Status   02/24/2023 1.9 1.8 - 7.7 K/uL Final     Gran %   Date Value Ref Range Status   02/24/2023 50.2 38.0 - 73.0 % Final     Kappa Free Light Chains   Date Value Ref Range Status   01/27/2023 10.96 (H) 0.33 - 1.94 mg/dL Final   12/22/2022 11.70 (H) 0.33 - 1.94 mg/dL Final   11/23/2022 11.77 (H) 0.33 - 1.94 mg/dL Final     Lambda Free Light Chains   Date Value Ref Range Status   01/27/2023 3.08 (H) 0.57 - 2.63 mg/dL Final   12/22/2022 1.43 0.57 - 2.63 mg/dL Final   11/23/2022 2.18 0.57 - 2.63 mg/dL Final     Kappa/Lambda FLC Ratio   Date Value Ref Range Status   01/27/2023 3.56 (H) 0.26 - 1.65 Final     Comment:     Undetected antigen excess is a rare event but cannot   be excluded. If these free light chain results do not   agree with other clinical or laboratory findings or   if the sample is from a patient that has previously   demonstrated antigen excess, discuss with the testing   laboratory.   Results should always be interpreted in conjunction   with other laboratory tests and clinical evidence.     12/22/2022 8.18 (H) 0.26 - 1.65 Final     Comment:     Undetected antigen excess is a rare event but cannot   be excluded. If these free light chain results do not   agree with other clinical or laboratory findings or   if the sample is from a patient that has previously   demonstrated antigen excess, discuss with the testing   laboratory.   Results should always be interpreted in conjunction   with other laboratory tests and clinical evidence.     11/23/2022 5.40 (H) 0.26 - 1.65 Final     Comment:     Undetected antigen excess is  a rare event but cannot   be excluded. If these free light chain results do not   agree with other clinical or laboratory findings or   if the sample is from a patient that has previously   demonstrated antigen excess, discuss with the testing   laboratory.   Results should always be interpreted in conjunction   with other laboratory tests and clinical evidence.       IgG   Date Value Ref Range Status   02/10/2023 1194 650 - 1600 mg/dL Final     Comment:     IgG Cord Blood Reference Range: 650-1600 mg/dL.     IgA   Date Value Ref Range Status   01/27/2023 146 40 - 350 mg/dL Final     Comment:     IgA Cord Blood Reference Range: <5 mg/dL.     IgM   Date Value Ref Range Status   01/27/2023 60 50 - 300 mg/dL Final     Comment:     IgM Cord Blood Reference Range: <25 mg/dL.        Imaging:      Assessment:       Ms. Thompson is a  72 y.o. femal with relapsed multiple myeloma.     Plan:     MM s/p Auto SCT  - high risk MM with 17p deletion  - 2  year 0 months  s/p Auto SCT  - started maintenance q2w velcade 5/6/20   - serial biochemical relapse  Noted and  given this and high risk CG , transitioned to Sravanthi/Noble (1mg/m2) /dex  Salvage July 2021    - Due to uncontrolled hyperglycemia dex stopped after C4.    -supportive meds:   continue ppx acyclovir, asa; resume maintenance zometa q 3 months for 1 year; last dose on 08/04/22, next dose today.  -she  demonstrated mild biochemical progression over summer 2022 studies with no CRAB, in light of this and high risk CG   we transitioned her therapy from Sravanthi/Velcade to Sravanthi/pomalyst; intolerant to dex.   - biochemical studies from 2/24/23 cw with continued MT so plan to continue sravanthi/pom until intolerance or progression. Pomalyst 4 mg dose adjusted to 3 mg due to worsening cytopenia(12/22/23).  Cytopenia improved, patient tolerating so continue current dose    -recommended she take prn imodium for occasional loose stool with pomalyst   -already on asa 81mg   -post transplant  "vaccines completed         Anemia due to chemotherapy/JESSE  -due to therapy  -also significant iron deficiency noted today; plan to proceed with weekly IV feraheme x 2 dose; discussed small <1% risk of potentially serious and fatal allergic reactions with iv iron preparations and patient expressed understanding and wishes to proceed     Neuropathy  -Stable   - continues gabapentin and prn tramadol     SOB/descending aortic aneurysm  - CTA and dopplers ordered urgently with high concern for DVT/PE; completed 8/3/20  - incidental descending thoracic aortic aneurysm noted; referred to thoracic surgery; seen 8/7/20; per note: "at current size would not recommend any intervention as risk of rupture remains low.  Recommend surveillance CT chest every 12 months to monitor growth of the aneurysm.  Would typically recommend aspirin 81 mg daily in patients with aortic aneurysm  - ASA started (9/14/20)     HTN  - continue norvasc  - Patient to monitor BP closely    Dementia/anxiety  - contniue fu with neuro/psych;  defer medication mgmt to them        DM2  - diet controlled    Hx of CVA  - s/p 2008, 2011    HLD with Statin Intolerance  -on praluent, PCSK9 inhibitor    L breast cancer DCIS stage 0  -s/p lumpectomy and radiation, no endocrine tx due to CVA      FU:    -please schedule weekly feraheme infusions x 2 doses in next 1-2 weeks; [facility] or [facility] fine, just first available   -cbc, cmp, serum free light chains, quantitative immunoglobulins, serum electropheresis, serum immunofixation, ferritin lab and np appt on 3/24/23 at [facility]                            "

## 2023-02-27 ENCOUNTER — PATIENT MESSAGE (OUTPATIENT)
Dept: HEMATOLOGY/ONCOLOGY | Facility: CLINIC | Age: 73
End: 2023-02-27
Payer: MEDICARE

## 2023-02-27 RX ORDER — SODIUM CHLORIDE 0.9 % (FLUSH) 0.9 %
10 SYRINGE (ML) INJECTION
Status: CANCELLED | OUTPATIENT
Start: 2023-03-06

## 2023-02-27 RX ORDER — METHYLPREDNISOLONE SOD SUCC 125 MG
125 VIAL (EA) INJECTION ONCE AS NEEDED
Status: CANCELLED | OUTPATIENT
Start: 2023-03-06

## 2023-02-27 RX ORDER — DIPHENHYDRAMINE HYDROCHLORIDE 50 MG/ML
50 INJECTION INTRAMUSCULAR; INTRAVENOUS ONCE AS NEEDED
Status: CANCELLED | OUTPATIENT
Start: 2023-03-06

## 2023-02-27 RX ORDER — HEPARIN 100 UNIT/ML
500 SYRINGE INTRAVENOUS
Status: CANCELLED | OUTPATIENT
Start: 2023-03-06

## 2023-02-27 RX ORDER — EPINEPHRINE 0.3 MG/.3ML
0.3 INJECTION SUBCUTANEOUS ONCE AS NEEDED
Status: CANCELLED | OUTPATIENT
Start: 2023-03-06

## 2023-02-28 ENCOUNTER — TELEPHONE (OUTPATIENT)
Dept: NEUROLOGY | Facility: CLINIC | Age: 73
End: 2023-02-28
Payer: MEDICARE

## 2023-02-28 NOTE — TELEPHONE ENCOUNTER
----- Message from Rafi Martino sent at 2/28/2023  3:31 PM CST -----  Contact: Peg  Type:  Patient Call          Who Called: Delmis Cardenas         Does the patient know what this is regarding?: Requesting a call back to give a peer to peer for a lab test for genetic test  ; please advise           Best Call Back Number:[phone]             Additional Information: please call by tomorrow at 12 noon a peer to peer is necessary

## 2023-03-01 ENCOUNTER — TELEPHONE (OUTPATIENT)
Dept: NEUROLOGY | Facility: CLINIC | Age: 73
End: 2023-03-01
Payer: MEDICARE

## 2023-03-01 NOTE — TELEPHONE ENCOUNTER
----- Message from Becky Stone sent at 3/1/2023  1:08 PM CST -----  Contact: Delmis Benites is calling in regards to providing insurance denial for Pt Right [facility] Genetic testing. They will be sending out a letter of denial also to provider office. Contact number and ref # is below.         Confirmed contact below: Delmis   Contact Name:Phone Number: [phone]     Ref#[id number]      Confirmed contact below:   Contact Name:Shelby Thompson  Phone Number: [phone]

## 2023-03-03 ENCOUNTER — TELEPHONE (OUTPATIENT)
Dept: NEUROLOGY | Facility: CLINIC | Age: 73
End: 2023-03-03
Payer: MEDICARE

## 2023-03-03 NOTE — TELEPHONE ENCOUNTER
----- Message from Becky Stone sent at 3/1/2023  1:08 PM CST -----  Contact: Delmis Benites is calling in regards to providing insurance denial for Pt Right Med Genetic testing. They will be sending out a letter of denial also to provider office. Contact number and ref # is below.            Confirmed contact below: Delmis   Contact Name:Phone Number: [phone]     Ref#[id number]        Confirmed contact below:   Contact Name:Shelby Thompson  Phone Number: [phone]

## 2023-03-10 ENCOUNTER — TELEPHONE (OUTPATIENT)
Dept: HEMATOLOGY/ONCOLOGY | Facility: CLINIC | Age: 73
End: 2023-03-10
Payer: MEDICARE

## 2023-03-16 ENCOUNTER — INFUSION (OUTPATIENT)
Dept: INFUSION THERAPY | Facility: HOSPITAL | Age: 73
End: 2023-03-16
Payer: MEDICARE

## 2023-03-16 VITALS
WEIGHT: 146.63 LBS | HEIGHT: 63 IN | RESPIRATION RATE: 16 BRPM | SYSTOLIC BLOOD PRESSURE: 141 MMHG | BODY MASS INDEX: 25.98 KG/M2 | DIASTOLIC BLOOD PRESSURE: 73 MMHG | HEART RATE: 71 BPM

## 2023-03-16 DIAGNOSIS — C90.00 MULTIPLE MYELOMA, REMISSION STATUS UNSPECIFIED: ICD-10-CM

## 2023-03-16 DIAGNOSIS — D50.0 IRON DEFICIENCY ANEMIA DUE TO CHRONIC BLOOD LOSS: Primary | ICD-10-CM

## 2023-03-16 PROCEDURE — 25000003 PHARM REV CODE 250: Mod: HCNC | Performed by: NURSE PRACTITIONER

## 2023-03-16 PROCEDURE — 96367 TX/PROPH/DG ADDL SEQ IV INF: CPT | Mod: HCNC

## 2023-03-16 PROCEDURE — 63600175 PHARM REV CODE 636 W HCPCS: Mod: HCNC | Performed by: NURSE PRACTITIONER

## 2023-03-16 PROCEDURE — 96365 THER/PROPH/DIAG IV INF INIT: CPT | Mod: HCNC

## 2023-03-16 RX ORDER — SODIUM CHLORIDE 0.9 % (FLUSH) 0.9 %
10 SYRINGE (ML) INJECTION
Status: CANCELLED | OUTPATIENT
Start: 2023-03-23

## 2023-03-16 RX ORDER — SODIUM CHLORIDE 0.9 % (FLUSH) 0.9 %
10 SYRINGE (ML) INJECTION
Status: DISCONTINUED | OUTPATIENT
Start: 2023-03-16 | End: 2023-03-16 | Stop reason: HOSPADM

## 2023-03-16 RX ORDER — HEPARIN 100 UNIT/ML
500 SYRINGE INTRAVENOUS
Status: DISCONTINUED | OUTPATIENT
Start: 2023-03-16 | End: 2023-03-16 | Stop reason: HOSPADM

## 2023-03-16 RX ORDER — HEPARIN 100 UNIT/ML
500 SYRINGE INTRAVENOUS
Status: CANCELLED | OUTPATIENT
Start: 2023-03-23

## 2023-03-16 RX ADMIN — IRON SUCROSE 100 MG: 20 INJECTION, SOLUTION INTRAVENOUS at 10:03

## 2023-03-16 NOTE — TELEPHONE ENCOUNTER
----- Message from Tremontana Chevalier sent at 3/16/2023  3:46 PM CDT -----  Regarding: refill  RX Name:  pomalidomide 3 mg Cap 21 capsule     Sig: TAKE 1 CAPSULE BY MOUTH ONCE DAILY ON DAYS 1-21 OF 28 DAY CYCLE..    How is it taken:    Quantity: 21       Preferred Pharmacy with phone number: [facility] from [facility]      Ordering Provider: Dr. Tineo       Contact Preference: [phone]    Addl info:

## 2023-03-16 NOTE — PLAN OF CARE
Pt ambulated to unit with friend.  Reviewed tx plan with pt and pt without any questions.  Pt for #1/2 of Venofer.  Infused over 1hr per order.  Pt tolerated tx without any difficulty and pt waited the 30 min observation period without any s/s of reaction.  Pt and friend ambulated off unit without any questions, comments or concerns.

## 2023-03-24 ENCOUNTER — INFUSION (OUTPATIENT)
Dept: INFUSION THERAPY | Facility: HOSPITAL | Age: 73
End: 2023-03-24
Payer: MEDICARE

## 2023-03-24 ENCOUNTER — OFFICE VISIT (OUTPATIENT)
Dept: HEMATOLOGY/ONCOLOGY | Facility: CLINIC | Age: 73
End: 2023-03-24
Payer: MEDICARE

## 2023-03-24 VITALS
TEMPERATURE: 98 F | SYSTOLIC BLOOD PRESSURE: 135 MMHG | DIASTOLIC BLOOD PRESSURE: 64 MMHG | RESPIRATION RATE: 18 BRPM | HEART RATE: 65 BPM

## 2023-03-24 VITALS
RESPIRATION RATE: 20 BRPM | TEMPERATURE: 98 F | HEIGHT: 63 IN | DIASTOLIC BLOOD PRESSURE: 66 MMHG | SYSTOLIC BLOOD PRESSURE: 122 MMHG | BODY MASS INDEX: 26.21 KG/M2 | OXYGEN SATURATION: 99 % | HEART RATE: 54 BPM | WEIGHT: 147.94 LBS

## 2023-03-24 DIAGNOSIS — E11.9 TYPE 2 DIABETES MELLITUS WITHOUT COMPLICATION, WITHOUT LONG-TERM CURRENT USE OF INSULIN: ICD-10-CM

## 2023-03-24 DIAGNOSIS — I69.30 HISTORY OF CVA WITH RESIDUAL DEFICIT: ICD-10-CM

## 2023-03-24 DIAGNOSIS — Z85.3 HISTORY OF LEFT BREAST CANCER: ICD-10-CM

## 2023-03-24 DIAGNOSIS — C90.01 MULTIPLE MYELOMA IN REMISSION: Primary | ICD-10-CM

## 2023-03-24 DIAGNOSIS — C90.02 MULTIPLE MYELOMA IN RELAPSE: ICD-10-CM

## 2023-03-24 DIAGNOSIS — T45.1X5A CHEMOTHERAPY INDUCED DIARRHEA: ICD-10-CM

## 2023-03-24 DIAGNOSIS — D84.9 IMMUNOCOMPROMISED: ICD-10-CM

## 2023-03-24 DIAGNOSIS — D63.0 ANEMIA IN NEOPLASTIC DISEASE: ICD-10-CM

## 2023-03-24 DIAGNOSIS — D50.9 IRON DEFICIENCY ANEMIA, UNSPECIFIED IRON DEFICIENCY ANEMIA TYPE: ICD-10-CM

## 2023-03-24 DIAGNOSIS — K52.1 CHEMOTHERAPY INDUCED DIARRHEA: ICD-10-CM

## 2023-03-24 DIAGNOSIS — E78.00 PURE HYPERCHOLESTEROLEMIA: ICD-10-CM

## 2023-03-24 DIAGNOSIS — C80.1 NEUROPATHY ASSOCIATED WITH CANCER: ICD-10-CM

## 2023-03-24 DIAGNOSIS — Z94.84 HISTORY OF AUTO STEM CELL TRANSPLANT: ICD-10-CM

## 2023-03-24 DIAGNOSIS — D50.0 IRON DEFICIENCY ANEMIA DUE TO CHRONIC BLOOD LOSS: ICD-10-CM

## 2023-03-24 DIAGNOSIS — I10 ESSENTIAL HYPERTENSION: ICD-10-CM

## 2023-03-24 DIAGNOSIS — C90.00 MULTIPLE MYELOMA, REMISSION STATUS UNSPECIFIED: Primary | ICD-10-CM

## 2023-03-24 DIAGNOSIS — G63 NEUROPATHY ASSOCIATED WITH CANCER: ICD-10-CM

## 2023-03-24 PROCEDURE — 1126F AMNT PAIN NOTED NONE PRSNT: CPT | Mod: HCNC,CPTII,S$GLB, | Performed by: NURSE PRACTITIONER

## 2023-03-24 PROCEDURE — 3074F PR MOST RECENT SYSTOLIC BLOOD PRESSURE < 130 MM HG: ICD-10-PCS | Mod: HCNC,CPTII,S$GLB, | Performed by: NURSE PRACTITIONER

## 2023-03-24 PROCEDURE — 99999 PR PBB SHADOW E&M-EST. PATIENT-LVL V: ICD-10-PCS | Mod: PBBFAC,HCNC,, | Performed by: NURSE PRACTITIONER

## 2023-03-24 PROCEDURE — 3078F PR MOST RECENT DIASTOLIC BLOOD PRESSURE < 80 MM HG: ICD-10-PCS | Mod: HCNC,CPTII,S$GLB, | Performed by: NURSE PRACTITIONER

## 2023-03-24 PROCEDURE — 99999 PR PBB SHADOW E&M-EST. PATIENT-LVL V: CPT | Mod: PBBFAC,HCNC,, | Performed by: NURSE PRACTITIONER

## 2023-03-24 PROCEDURE — 3078F DIAST BP <80 MM HG: CPT | Mod: HCNC,CPTII,S$GLB, | Performed by: NURSE PRACTITIONER

## 2023-03-24 PROCEDURE — 3074F SYST BP LT 130 MM HG: CPT | Mod: HCNC,CPTII,S$GLB, | Performed by: NURSE PRACTITIONER

## 2023-03-24 PROCEDURE — 1101F PT FALLS ASSESS-DOCD LE1/YR: CPT | Mod: HCNC,CPTII,S$GLB, | Performed by: NURSE PRACTITIONER

## 2023-03-24 PROCEDURE — 3288F FALL RISK ASSESSMENT DOCD: CPT | Mod: HCNC,CPTII,S$GLB, | Performed by: NURSE PRACTITIONER

## 2023-03-24 PROCEDURE — 1160F PR REVIEW ALL MEDS BY PRESCRIBER/CLIN PHARMACIST DOCUMENTED: ICD-10-PCS | Mod: HCNC,CPTII,S$GLB, | Performed by: NURSE PRACTITIONER

## 2023-03-24 PROCEDURE — 1101F PR PT FALLS ASSESS DOC 0-1 FALLS W/OUT INJ PAST YR: ICD-10-PCS | Mod: HCNC,CPTII,S$GLB, | Performed by: NURSE PRACTITIONER

## 2023-03-24 PROCEDURE — 1159F PR MEDICATION LIST DOCUMENTED IN MEDICAL RECORD: ICD-10-PCS | Mod: HCNC,CPTII,S$GLB, | Performed by: NURSE PRACTITIONER

## 2023-03-24 PROCEDURE — 3044F HG A1C LEVEL LT 7.0%: CPT | Mod: HCNC,CPTII,S$GLB, | Performed by: NURSE PRACTITIONER

## 2023-03-24 PROCEDURE — 63600175 PHARM REV CODE 636 W HCPCS: Mod: JZ,TB,HCNC | Performed by: NURSE PRACTITIONER

## 2023-03-24 PROCEDURE — 3008F BODY MASS INDEX DOCD: CPT | Mod: HCNC,CPTII,S$GLB, | Performed by: NURSE PRACTITIONER

## 2023-03-24 PROCEDURE — 1160F RVW MEDS BY RX/DR IN RCRD: CPT | Mod: HCNC,CPTII,S$GLB, | Performed by: NURSE PRACTITIONER

## 2023-03-24 PROCEDURE — 3288F PR FALLS RISK ASSESSMENT DOCUMENTED: ICD-10-PCS | Mod: HCNC,CPTII,S$GLB, | Performed by: NURSE PRACTITIONER

## 2023-03-24 PROCEDURE — 1126F PR PAIN SEVERITY QUANTIFIED, NO PAIN PRESENT: ICD-10-PCS | Mod: HCNC,CPTII,S$GLB, | Performed by: NURSE PRACTITIONER

## 2023-03-24 PROCEDURE — 1159F MED LIST DOCD IN RCRD: CPT | Mod: HCNC,CPTII,S$GLB, | Performed by: NURSE PRACTITIONER

## 2023-03-24 PROCEDURE — 96401 CHEMO ANTI-NEOPL SQ/IM: CPT | Mod: HCNC

## 2023-03-24 PROCEDURE — 96365 THER/PROPH/DIAG IV INF INIT: CPT | Mod: HCNC

## 2023-03-24 PROCEDURE — 25000003 PHARM REV CODE 250: Mod: HCNC | Performed by: NURSE PRACTITIONER

## 2023-03-24 PROCEDURE — 99215 PR OFFICE/OUTPT VISIT, EST, LEVL V, 40-54 MIN: ICD-10-PCS | Mod: HCNC,S$GLB,, | Performed by: NURSE PRACTITIONER

## 2023-03-24 PROCEDURE — 99215 OFFICE O/P EST HI 40 MIN: CPT | Mod: HCNC,S$GLB,, | Performed by: NURSE PRACTITIONER

## 2023-03-24 PROCEDURE — 3044F PR MOST RECENT HEMOGLOBIN A1C LEVEL <7.0%: ICD-10-PCS | Mod: HCNC,CPTII,S$GLB, | Performed by: NURSE PRACTITIONER

## 2023-03-24 PROCEDURE — 3008F PR BODY MASS INDEX (BMI) DOCUMENTED: ICD-10-PCS | Mod: HCNC,CPTII,S$GLB, | Performed by: NURSE PRACTITIONER

## 2023-03-24 RX ORDER — EPINEPHRINE 0.3 MG/.3ML
0.3 INJECTION SUBCUTANEOUS ONCE AS NEEDED
Status: CANCELLED | OUTPATIENT
Start: 2023-04-01

## 2023-03-24 RX ORDER — HEPARIN 100 UNIT/ML
500 SYRINGE INTRAVENOUS
Status: CANCELLED | OUTPATIENT
Start: 2023-03-30

## 2023-03-24 RX ORDER — EPINEPHRINE 0.3 MG/.3ML
0.3 INJECTION SUBCUTANEOUS ONCE AS NEEDED
Status: DISCONTINUED | OUTPATIENT
Start: 2023-03-24 | End: 2023-03-24 | Stop reason: HOSPADM

## 2023-03-24 RX ORDER — LOPERAMIDE HYDROCHLORIDE 2 MG/1
2 CAPSULE ORAL 4 TIMES DAILY PRN
Qty: 60 CAPSULE | Refills: 0 | Status: SHIPPED | OUTPATIENT
Start: 2023-03-24 | End: 2023-06-27

## 2023-03-24 RX ORDER — DIPHENHYDRAMINE HYDROCHLORIDE 50 MG/ML
50 INJECTION INTRAMUSCULAR; INTRAVENOUS ONCE AS NEEDED
Status: CANCELLED | OUTPATIENT
Start: 2023-04-01

## 2023-03-24 RX ORDER — HEPARIN 100 UNIT/ML
500 SYRINGE INTRAVENOUS
Status: DISCONTINUED | OUTPATIENT
Start: 2023-03-24 | End: 2023-03-24 | Stop reason: HOSPADM

## 2023-03-24 RX ORDER — SODIUM CHLORIDE 0.9 % (FLUSH) 0.9 %
10 SYRINGE (ML) INJECTION
Status: DISCONTINUED | OUTPATIENT
Start: 2023-03-24 | End: 2023-03-24 | Stop reason: HOSPADM

## 2023-03-24 RX ORDER — DIPHENHYDRAMINE HYDROCHLORIDE 50 MG/ML
50 INJECTION INTRAMUSCULAR; INTRAVENOUS ONCE AS NEEDED
Status: DISCONTINUED | OUTPATIENT
Start: 2023-03-24 | End: 2023-03-24 | Stop reason: HOSPADM

## 2023-03-24 RX ORDER — SODIUM CHLORIDE 0.9 % (FLUSH) 0.9 %
10 SYRINGE (ML) INJECTION
Status: CANCELLED | OUTPATIENT
Start: 2023-03-30

## 2023-03-24 RX ADMIN — IRON SUCROSE 100 MG: 20 INJECTION, SOLUTION INTRAVENOUS at 09:03

## 2023-03-24 RX ADMIN — DARATUMUMAB AND HYALURONIDASE-FIHJ (HUMAN RECOMBINANT) 1800 MG: 1800; 30000 INJECTION SUBCUTANEOUS at 10:03

## 2023-03-24 RX ADMIN — SODIUM CHLORIDE: 9 INJECTION, SOLUTION INTRAVENOUS at 09:03

## 2023-03-24 NOTE — PLAN OF CARE
1100 pt tolerated Sravanthi injection and Venofer infusion without issue, pt to rtc 4/21/23, no distress noted upon d/c to home with daughter

## 2023-03-24 NOTE — PLAN OF CARE
0920 pt here for C21 Sravanthi injection and Venofer IV, was going to push ok with Adina Wright but waiting on Sravanthi to be made so will give over one hour, hx, meds, allergies reviewed, pt with no new complaints at this time, gerald lewis chair, continue to monitor

## 2023-04-12 ENCOUNTER — TELEPHONE (OUTPATIENT)
Dept: HEMATOLOGY/ONCOLOGY | Facility: CLINIC | Age: 73
End: 2023-04-12
Payer: MEDICARE

## 2023-04-12 NOTE — TELEPHONE ENCOUNTER
Auth number obtained from [employer], rx sent to provider for signature to be sent to the pharmacy.

## 2023-04-12 NOTE — TELEPHONE ENCOUNTER
"----- Message from Nancy Haynes sent at 4/12/2023  1:45 PM CDT -----  RX Name and Strength:pomalidomide 3 mg Cap     How is the patient currently taking it? TAKE 1 CAPSULE BY MOUTH ONCE DAILY ON DAYS 1-21 OF 28 DAY CYCLE..     Is this a 30 day or 90 day Rx? 21 capsule       Preferred Pharmacy with phone number:     [facility] - [place], [place] - [address]  [address] [address]  Phone: [phone] Fax: [phone]         Local or Mail Order: Mail          Ordering Provider: Noman          Contact Preference: [phone]        Additional Information:  "Thank you for all that you do for our patients"    "

## 2023-04-14 DIAGNOSIS — C90.00 MULTIPLE MYELOMA, REMISSION STATUS UNSPECIFIED: ICD-10-CM

## 2023-04-18 ENCOUNTER — OFFICE VISIT (OUTPATIENT)
Dept: INTERNAL MEDICINE | Facility: CLINIC | Age: 73
End: 2023-04-18
Payer: MEDICARE

## 2023-04-18 VITALS
DIASTOLIC BLOOD PRESSURE: 74 MMHG | BODY MASS INDEX: 26.45 KG/M2 | OXYGEN SATURATION: 95 % | HEART RATE: 51 BPM | SYSTOLIC BLOOD PRESSURE: 110 MMHG | WEIGHT: 143.75 LBS | HEIGHT: 62 IN

## 2023-04-18 DIAGNOSIS — R63.4 WEIGHT LOSS: ICD-10-CM

## 2023-04-18 DIAGNOSIS — E11.59 HYPERTENSION ASSOCIATED WITH DIABETES: ICD-10-CM

## 2023-04-18 DIAGNOSIS — C90.02 MULTIPLE MYELOMA IN RELAPSE: ICD-10-CM

## 2023-04-18 DIAGNOSIS — I10 ESSENTIAL HYPERTENSION: ICD-10-CM

## 2023-04-18 DIAGNOSIS — C90.00 METASTATIC MULTIPLE MYELOMA TO BONE: ICD-10-CM

## 2023-04-18 DIAGNOSIS — Z79.4 TYPE 2 DIABETES MELLITUS WITH DIABETIC PERIPHERAL ANGIOPATHY WITHOUT GANGRENE, WITH LONG-TERM CURRENT USE OF INSULIN: ICD-10-CM

## 2023-04-18 DIAGNOSIS — E11.51 TYPE 2 DIABETES MELLITUS WITH DIABETIC PERIPHERAL ANGIOPATHY WITHOUT GANGRENE, WITH LONG-TERM CURRENT USE OF INSULIN: ICD-10-CM

## 2023-04-18 DIAGNOSIS — E78.5 HYPERLIPIDEMIA ASSOCIATED WITH TYPE 2 DIABETES MELLITUS: Primary | ICD-10-CM

## 2023-04-18 DIAGNOSIS — E11.69 HYPERLIPIDEMIA ASSOCIATED WITH TYPE 2 DIABETES MELLITUS: Primary | ICD-10-CM

## 2023-04-18 DIAGNOSIS — I69.30 HISTORY OF CVA WITH RESIDUAL DEFICIT: ICD-10-CM

## 2023-04-18 DIAGNOSIS — I73.9 PAD (PERIPHERAL ARTERY DISEASE): ICD-10-CM

## 2023-04-18 DIAGNOSIS — I15.2 HYPERTENSION ASSOCIATED WITH DIABETES: ICD-10-CM

## 2023-04-18 DIAGNOSIS — C90.01 MULTIPLE MYELOMA IN REMISSION: ICD-10-CM

## 2023-04-18 PROCEDURE — 99499 RISK ADDL DX/OHS AUDIT: ICD-10-PCS | Mod: HCNC,S$GLB,, | Performed by: INTERNAL MEDICINE

## 2023-04-18 PROCEDURE — 99999 PR PBB SHADOW E&M-EST. PATIENT-LVL III: CPT | Mod: PBBFAC,HCNC,, | Performed by: INTERNAL MEDICINE

## 2023-04-18 PROCEDURE — 3008F BODY MASS INDEX DOCD: CPT | Mod: HCNC,CPTII,S$GLB, | Performed by: INTERNAL MEDICINE

## 2023-04-18 PROCEDURE — 99214 OFFICE O/P EST MOD 30 MIN: CPT | Mod: HCNC,S$GLB,, | Performed by: INTERNAL MEDICINE

## 2023-04-18 PROCEDURE — 3078F DIAST BP <80 MM HG: CPT | Mod: HCNC,CPTII,S$GLB, | Performed by: INTERNAL MEDICINE

## 2023-04-18 PROCEDURE — 3044F PR MOST RECENT HEMOGLOBIN A1C LEVEL <7.0%: ICD-10-PCS | Mod: HCNC,CPTII,S$GLB, | Performed by: INTERNAL MEDICINE

## 2023-04-18 PROCEDURE — 1159F MED LIST DOCD IN RCRD: CPT | Mod: HCNC,CPTII,S$GLB, | Performed by: INTERNAL MEDICINE

## 2023-04-18 PROCEDURE — 1126F AMNT PAIN NOTED NONE PRSNT: CPT | Mod: HCNC,CPTII,S$GLB, | Performed by: INTERNAL MEDICINE

## 2023-04-18 PROCEDURE — 3074F PR MOST RECENT SYSTOLIC BLOOD PRESSURE < 130 MM HG: ICD-10-PCS | Mod: HCNC,CPTII,S$GLB, | Performed by: INTERNAL MEDICINE

## 2023-04-18 PROCEDURE — 1101F PT FALLS ASSESS-DOCD LE1/YR: CPT | Mod: HCNC,CPTII,S$GLB, | Performed by: INTERNAL MEDICINE

## 2023-04-18 PROCEDURE — 3008F PR BODY MASS INDEX (BMI) DOCUMENTED: ICD-10-PCS | Mod: HCNC,CPTII,S$GLB, | Performed by: INTERNAL MEDICINE

## 2023-04-18 PROCEDURE — 99999 PR PBB SHADOW E&M-EST. PATIENT-LVL III: ICD-10-PCS | Mod: PBBFAC,HCNC,, | Performed by: INTERNAL MEDICINE

## 2023-04-18 PROCEDURE — 3044F HG A1C LEVEL LT 7.0%: CPT | Mod: HCNC,CPTII,S$GLB, | Performed by: INTERNAL MEDICINE

## 2023-04-18 PROCEDURE — 1101F PR PT FALLS ASSESS DOC 0-1 FALLS W/OUT INJ PAST YR: ICD-10-PCS | Mod: HCNC,CPTII,S$GLB, | Performed by: INTERNAL MEDICINE

## 2023-04-18 PROCEDURE — 3288F PR FALLS RISK ASSESSMENT DOCUMENTED: ICD-10-PCS | Mod: HCNC,CPTII,S$GLB, | Performed by: INTERNAL MEDICINE

## 2023-04-18 PROCEDURE — 4010F PR ACE/ARB THEARPY RXD/TAKEN: ICD-10-PCS | Mod: HCNC,CPTII,S$GLB, | Performed by: INTERNAL MEDICINE

## 2023-04-18 PROCEDURE — 3288F FALL RISK ASSESSMENT DOCD: CPT | Mod: HCNC,CPTII,S$GLB, | Performed by: INTERNAL MEDICINE

## 2023-04-18 PROCEDURE — 99499 UNLISTED E&M SERVICE: CPT | Mod: HCNC,S$GLB,, | Performed by: INTERNAL MEDICINE

## 2023-04-18 PROCEDURE — 3078F PR MOST RECENT DIASTOLIC BLOOD PRESSURE < 80 MM HG: ICD-10-PCS | Mod: HCNC,CPTII,S$GLB, | Performed by: INTERNAL MEDICINE

## 2023-04-18 PROCEDURE — 1159F PR MEDICATION LIST DOCUMENTED IN MEDICAL RECORD: ICD-10-PCS | Mod: HCNC,CPTII,S$GLB, | Performed by: INTERNAL MEDICINE

## 2023-04-18 PROCEDURE — 1126F PR PAIN SEVERITY QUANTIFIED, NO PAIN PRESENT: ICD-10-PCS | Mod: HCNC,CPTII,S$GLB, | Performed by: INTERNAL MEDICINE

## 2023-04-18 PROCEDURE — 3074F SYST BP LT 130 MM HG: CPT | Mod: HCNC,CPTII,S$GLB, | Performed by: INTERNAL MEDICINE

## 2023-04-18 PROCEDURE — 4010F ACE/ARB THERAPY RXD/TAKEN: CPT | Mod: HCNC,CPTII,S$GLB, | Performed by: INTERNAL MEDICINE

## 2023-04-18 PROCEDURE — 99214 PR OFFICE/OUTPT VISIT, EST, LEVL IV, 30-39 MIN: ICD-10-PCS | Mod: HCNC,S$GLB,, | Performed by: INTERNAL MEDICINE

## 2023-04-18 RX ORDER — GABAPENTIN 300 MG/1
300 CAPSULE ORAL 2 TIMES DAILY
Qty: 180 CAPSULE | Refills: 2 | Status: SHIPPED | OUTPATIENT
Start: 2023-04-18

## 2023-04-18 RX ORDER — VALSARTAN 160 MG/1
160 TABLET ORAL DAILY
Qty: 90 TABLET | Refills: 3 | Status: SHIPPED | OUTPATIENT
Start: 2023-04-18 | End: 2023-06-11 | Stop reason: SDUPTHER

## 2023-04-18 RX ORDER — METFORMIN HYDROCHLORIDE 1000 MG/1
1000 TABLET ORAL 2 TIMES DAILY WITH MEALS
Qty: 180 TABLET | Refills: 3 | Status: SHIPPED | OUTPATIENT
Start: 2023-04-18 | End: 2023-10-29 | Stop reason: SDUPTHER

## 2023-04-18 RX ORDER — ALBUTEROL SULFATE 1.25 MG/3ML
1.25 SOLUTION RESPIRATORY (INHALATION) EVERY 6 HOURS PRN
Qty: 75 ML | Refills: 2 | Status: SHIPPED | OUTPATIENT
Start: 2023-04-18

## 2023-04-21 ENCOUNTER — OFFICE VISIT (OUTPATIENT)
Dept: HEMATOLOGY/ONCOLOGY | Facility: CLINIC | Age: 73
End: 2023-04-21
Payer: MEDICARE

## 2023-04-21 ENCOUNTER — INFUSION (OUTPATIENT)
Dept: INFUSION THERAPY | Facility: HOSPITAL | Age: 73
End: 2023-04-21
Payer: MEDICARE

## 2023-04-21 VITALS
HEART RATE: 52 BPM | TEMPERATURE: 99 F | SYSTOLIC BLOOD PRESSURE: 135 MMHG | DIASTOLIC BLOOD PRESSURE: 62 MMHG | RESPIRATION RATE: 20 BRPM | HEIGHT: 62 IN | OXYGEN SATURATION: 96 % | WEIGHT: 145.38 LBS | BODY MASS INDEX: 26.75 KG/M2

## 2023-04-21 VITALS
TEMPERATURE: 99 F | WEIGHT: 145.38 LBS | HEART RATE: 59 BPM | BODY MASS INDEX: 26.75 KG/M2 | DIASTOLIC BLOOD PRESSURE: 76 MMHG | HEIGHT: 62 IN | RESPIRATION RATE: 18 BRPM | SYSTOLIC BLOOD PRESSURE: 125 MMHG

## 2023-04-21 DIAGNOSIS — D84.9 IMMUNOCOMPROMISED: ICD-10-CM

## 2023-04-21 DIAGNOSIS — C90.01 MULTIPLE MYELOMA IN REMISSION: Primary | ICD-10-CM

## 2023-04-21 DIAGNOSIS — C90.00 MULTIPLE MYELOMA, REMISSION STATUS UNSPECIFIED: Primary | ICD-10-CM

## 2023-04-21 DIAGNOSIS — I69.30 HISTORY OF CVA WITH RESIDUAL DEFICIT: ICD-10-CM

## 2023-04-21 DIAGNOSIS — I10 ESSENTIAL HYPERTENSION: ICD-10-CM

## 2023-04-21 DIAGNOSIS — C90.02 MULTIPLE MYELOMA IN RELAPSE: ICD-10-CM

## 2023-04-21 DIAGNOSIS — G63 NEUROPATHY ASSOCIATED WITH CANCER: ICD-10-CM

## 2023-04-21 DIAGNOSIS — E11.9 TYPE 2 DIABETES MELLITUS WITHOUT COMPLICATION, WITHOUT LONG-TERM CURRENT USE OF INSULIN: ICD-10-CM

## 2023-04-21 DIAGNOSIS — Z94.84 HISTORY OF AUTO STEM CELL TRANSPLANT: ICD-10-CM

## 2023-04-21 DIAGNOSIS — I71.9 DESCENDING AORTIC ANEURYSM: ICD-10-CM

## 2023-04-21 DIAGNOSIS — E78.5 HYPERLIPIDEMIA ASSOCIATED WITH TYPE 2 DIABETES MELLITUS: ICD-10-CM

## 2023-04-21 DIAGNOSIS — E11.69 HYPERLIPIDEMIA ASSOCIATED WITH TYPE 2 DIABETES MELLITUS: ICD-10-CM

## 2023-04-21 DIAGNOSIS — C80.1 NEUROPATHY ASSOCIATED WITH CANCER: ICD-10-CM

## 2023-04-21 PROCEDURE — 1159F MED LIST DOCD IN RCRD: CPT | Mod: HCNC,CPTII,S$GLB, | Performed by: NURSE PRACTITIONER

## 2023-04-21 PROCEDURE — 63600175 PHARM REV CODE 636 W HCPCS: Mod: JZ,TB,HCNC | Performed by: NURSE PRACTITIONER

## 2023-04-21 PROCEDURE — 1126F AMNT PAIN NOTED NONE PRSNT: CPT | Mod: HCNC,CPTII,S$GLB, | Performed by: NURSE PRACTITIONER

## 2023-04-21 PROCEDURE — 3075F SYST BP GE 130 - 139MM HG: CPT | Mod: HCNC,CPTII,S$GLB, | Performed by: NURSE PRACTITIONER

## 2023-04-21 PROCEDURE — 96401 CHEMO ANTI-NEOPL SQ/IM: CPT | Mod: HCNC

## 2023-04-21 PROCEDURE — 3075F PR MOST RECENT SYSTOLIC BLOOD PRESS GE 130-139MM HG: ICD-10-PCS | Mod: HCNC,CPTII,S$GLB, | Performed by: NURSE PRACTITIONER

## 2023-04-21 PROCEDURE — 3288F PR FALLS RISK ASSESSMENT DOCUMENTED: ICD-10-PCS | Mod: HCNC,CPTII,S$GLB, | Performed by: NURSE PRACTITIONER

## 2023-04-21 PROCEDURE — 1101F PT FALLS ASSESS-DOCD LE1/YR: CPT | Mod: HCNC,CPTII,S$GLB, | Performed by: NURSE PRACTITIONER

## 2023-04-21 PROCEDURE — 1160F PR REVIEW ALL MEDS BY PRESCRIBER/CLIN PHARMACIST DOCUMENTED: ICD-10-PCS | Mod: HCNC,CPTII,S$GLB, | Performed by: NURSE PRACTITIONER

## 2023-04-21 PROCEDURE — 1101F PR PT FALLS ASSESS DOC 0-1 FALLS W/OUT INJ PAST YR: ICD-10-PCS | Mod: HCNC,CPTII,S$GLB, | Performed by: NURSE PRACTITIONER

## 2023-04-21 PROCEDURE — 3044F HG A1C LEVEL LT 7.0%: CPT | Mod: HCNC,CPTII,S$GLB, | Performed by: NURSE PRACTITIONER

## 2023-04-21 PROCEDURE — 99215 PR OFFICE/OUTPT VISIT, EST, LEVL V, 40-54 MIN: ICD-10-PCS | Mod: HCNC,S$GLB,, | Performed by: NURSE PRACTITIONER

## 2023-04-21 PROCEDURE — 4010F ACE/ARB THERAPY RXD/TAKEN: CPT | Mod: HCNC,CPTII,S$GLB, | Performed by: NURSE PRACTITIONER

## 2023-04-21 PROCEDURE — 3288F FALL RISK ASSESSMENT DOCD: CPT | Mod: HCNC,CPTII,S$GLB, | Performed by: NURSE PRACTITIONER

## 2023-04-21 PROCEDURE — 1126F PR PAIN SEVERITY QUANTIFIED, NO PAIN PRESENT: ICD-10-PCS | Mod: HCNC,CPTII,S$GLB, | Performed by: NURSE PRACTITIONER

## 2023-04-21 PROCEDURE — 99999 PR PBB SHADOW E&M-EST. PATIENT-LVL V: ICD-10-PCS | Mod: PBBFAC,HCNC,, | Performed by: NURSE PRACTITIONER

## 2023-04-21 PROCEDURE — 1160F RVW MEDS BY RX/DR IN RCRD: CPT | Mod: HCNC,CPTII,S$GLB, | Performed by: NURSE PRACTITIONER

## 2023-04-21 PROCEDURE — 99215 OFFICE O/P EST HI 40 MIN: CPT | Mod: HCNC,S$GLB,, | Performed by: NURSE PRACTITIONER

## 2023-04-21 PROCEDURE — 3044F PR MOST RECENT HEMOGLOBIN A1C LEVEL <7.0%: ICD-10-PCS | Mod: HCNC,CPTII,S$GLB, | Performed by: NURSE PRACTITIONER

## 2023-04-21 PROCEDURE — 3008F PR BODY MASS INDEX (BMI) DOCUMENTED: ICD-10-PCS | Mod: HCNC,CPTII,S$GLB, | Performed by: NURSE PRACTITIONER

## 2023-04-21 PROCEDURE — 1159F PR MEDICATION LIST DOCUMENTED IN MEDICAL RECORD: ICD-10-PCS | Mod: HCNC,CPTII,S$GLB, | Performed by: NURSE PRACTITIONER

## 2023-04-21 PROCEDURE — 3078F PR MOST RECENT DIASTOLIC BLOOD PRESSURE < 80 MM HG: ICD-10-PCS | Mod: HCNC,CPTII,S$GLB, | Performed by: NURSE PRACTITIONER

## 2023-04-21 PROCEDURE — 4010F PR ACE/ARB THEARPY RXD/TAKEN: ICD-10-PCS | Mod: HCNC,CPTII,S$GLB, | Performed by: NURSE PRACTITIONER

## 2023-04-21 PROCEDURE — 99999 PR PBB SHADOW E&M-EST. PATIENT-LVL V: CPT | Mod: PBBFAC,HCNC,, | Performed by: NURSE PRACTITIONER

## 2023-04-21 PROCEDURE — 3008F BODY MASS INDEX DOCD: CPT | Mod: HCNC,CPTII,S$GLB, | Performed by: NURSE PRACTITIONER

## 2023-04-21 PROCEDURE — 3078F DIAST BP <80 MM HG: CPT | Mod: HCNC,CPTII,S$GLB, | Performed by: NURSE PRACTITIONER

## 2023-04-21 RX ORDER — INFLUENZA A VIRUS A/VICTORIA/2570/2019 IVR-215 (H1N1) ANTIGEN (FORMALDEHYDE INACTIVATED), INFLUENZA A VIRUS A/DARWIN/6/2021 IVR-227 (H3N2) ANTIGEN (FORMALDEHYDE INACTIVATED), INFLUENZA B VIRUS B/AUSTRIA/1359417/2021 BVR-26 ANTIGEN (FORMALDEHYDE INACTIVATED), INFLUENZA B VIRUS B/PHUKET/3073/2013 BVR-1B ANTIGEN (FORMALDEHYDE INACTIVATED) 15; 15; 15; 15 UG/.5ML; UG/.5ML; UG/.5ML; UG/.5ML
INJECTION, SUSPENSION INTRAMUSCULAR
COMMUNITY
Start: 2023-01-17 | End: 2023-06-19 | Stop reason: ALTCHOICE

## 2023-04-21 RX ORDER — EPINEPHRINE 0.3 MG/.3ML
0.3 INJECTION SUBCUTANEOUS ONCE AS NEEDED
Status: DISCONTINUED | OUTPATIENT
Start: 2023-04-21 | End: 2023-04-21 | Stop reason: HOSPADM

## 2023-04-21 RX ORDER — DIPHENHYDRAMINE HYDROCHLORIDE 50 MG/ML
50 INJECTION INTRAMUSCULAR; INTRAVENOUS ONCE AS NEEDED
Status: CANCELLED | OUTPATIENT
Start: 2023-04-21

## 2023-04-21 RX ORDER — DIPHENHYDRAMINE HYDROCHLORIDE 50 MG/ML
50 INJECTION INTRAMUSCULAR; INTRAVENOUS ONCE AS NEEDED
Status: DISCONTINUED | OUTPATIENT
Start: 2023-04-21 | End: 2023-04-21 | Stop reason: HOSPADM

## 2023-04-21 RX ORDER — EPINEPHRINE 0.3 MG/.3ML
0.3 INJECTION SUBCUTANEOUS ONCE AS NEEDED
Status: CANCELLED | OUTPATIENT
Start: 2023-04-21

## 2023-04-21 RX ADMIN — DARATUMUMAB AND HYALURONIDASE-FIHJ (HUMAN RECOMBINANT) 1800 MG: 1800; 30000 INJECTION SUBCUTANEOUS at 11:04

## 2023-04-21 NOTE — PLAN OF CARE
1135  Injection administered, pt tolerated; pt instructed to remain well hydrated; discussed when to contact MD, when to report to ER; pt instructed that clinic will contact pt via portal regarding schedule of Venofer IVPB treatments; pt and friend verbalized understanding of all discussed

## 2023-04-21 NOTE — NURSING
1024  Pt here for Sravanthi SQ injection, accompanied by friend, no new complaints or concerns, reports tolerating treatment; discussed treatment plan for today, all questions answered and pt agrees to proceed

## 2023-04-21 NOTE — PROGRESS NOTES
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  She achieved and tolerated well VRd x completing 7, 28 day cycles and achieving deep VGPR to induction. Then underwent Melphalan autologous stem cell transplant on 2/12/2020.      Extensive PMH as below.    2 prior CVAs (one in 2008, one in 2011)  L breast cancer DCIS stage 0 (s/p lumpectomy and radiation, no endocrine tx due to CVA)  HTN  DM II  Neuropathy, b/l hands  Prior smoker, quit in 2011  Hepatic lesions - vascular per recent MRI in 09 /2019 with no changes; will confirm no further rascon needed from help  Statin Intolerance - on praluent, PCSK9 inhibitor  HFpEF - EF 55%, diastolic dysfunction  Anxiety/Depression     ADMISSION SUMMARY: 2/10-2/26/2020  Admitted 2/10, tolerated chemo and transplant well. Engrafted on day +12. Remained afebrile throughout hospital stay and no mucositis. Experienced expected side effects of nausea and diarrhea controlled with antiemetics and Imodium. Discharged home with home PT/OT       Subjective:       Initial History: Ms. Thompson is a 73 y.o. female who presents for virtual follow up.  She is 2yrs 2 months post AutoSCT. Relapsed  at 1 year post sct and was on Sravanthi/zhanna/dex until summer 2022 when changed to sravanthi/pom/dex due to biochemical only progression. Remains on sravanthi/pom/dex salvage. Tolerating dose adjusted pomalyst. Occasional nausea, diarrhea controlled with PRN meds. Presents with her friend.  She is fatigued, otherwise stable. Moderate dementia stable following with neuro.  No new AEs from treatment to report.    Past Medical History:   Past Medical History:    Diagnosis Date    Acute respiratory failure with hypoxia 4/30/2019    FUENTES (acute kidney injury) 5/1/2019    Allergy     Anemia     Aortic aneurysm     Breast cancer 10/2011    left breast Stage 0 DCIS    Cataract     Chronic diastolic congestive heart failure 11/6/2015    Colon polyp     Community acquired pneumonia of right lower lobe of lung 8/3/2021    GERD (gastroesophageal reflux disease)     Hiatal hernia     History of colonic polyps     Hx of psychiatric care     Zoloft    HX: breast cancer     Hyperlipemia     Hypertension     ICH (intracerebral hemorrhage)     Immunocompromised 10/28/2022    Major depressive disorder, single episode, mild 6/23/2016    Nuclear sclerosis 7/21/2014    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 7/21/2014    PAD (peripheral artery disease) 11/6/2015    Psychiatric problem     Statin-induced rhabdomyolysis     4/2015     Stroke 4/2011    Type 2 diabetes mellitus with diabetic peripheral angiopathy without gangrene, with long-term current use of insulin 3/31/2021    Type 2 diabetes mellitus without complication, without long-term current use of insulin 2/10/2020    Walker as ambulation aid        Past Surgical HIstory:   Past Surgical History:   Procedure Laterality Date    APPENDECTOMY      BONE MARROW BIOPSY Left 10/3/2019    Procedure: Biopsy-bone marrow;  Surgeon: Jere Tineo MD;  Location: [facility] OR [address] [place] [address];  Service: Oncology;  Laterality: Left;    BREAST BIOPSY Left 10/2011    BREAST LUMPECTOMY Left 2011    DCIS    COLONOSCOPY      COLONOSCOPY N/A 1/9/2020    Procedure: COLONOSCOPY;  Surgeon: Quang De La Cruz MD;  Location: [facility] (4TH FLR);  Service: Endoscopy;  Laterality: N/A;    CYST REMOVAL      back    ESOPHAGOGASTRODUODENOSCOPY  07/2016    duodenal angioectasia    ESOPHAGOGASTRODUODENOSCOPY N/A 1/9/2020    Procedure: EGD (ESOPHAGOGASTRODUODENOSCOPY);  Surgeon: Quang De La Cruz MD;  Location: [facility] ENDO (4TH FLR);  Service:  Endoscopy;  Laterality: N/A;    FOOT FRACTURE SURGERY  10/2012    right foot, with R hallux valgus repair    FRACTURE SURGERY Right     broken toe repiar    HEMORRHOID SURGERY      LIVER BIOPSY  04/2015    essentially normal, no fibrosis    TUBAL LIGATION      UPPER GASTROINTESTINAL ENDOSCOPY         Family History:   Family History   Problem Relation Age of Onset    Dementia Sister         x1 sister    Cancer Sister 63        Lung Cancer    No Known Problems Mother     Cancer Father     No Known Problems Brother     Hypertension Daughter     Fibroids Daughter         uterine    Hypertension Son     No Known Problems Brother     No Known Problems Maternal Aunt     No Known Problems Maternal Uncle     No Known Problems Paternal Aunt     No Known Problems Paternal Uncle     Hypertension Maternal Grandmother     No Known Problems Maternal Grandfather     No Known Problems Paternal Grandmother     No Known Problems Paternal Grandfather     Ovarian cancer Neg Hx     Colon cancer Neg Hx     Tremor Neg Hx     Amblyopia Neg Hx     Blindness Neg Hx     Cataracts Neg Hx     Diabetes Neg Hx     Glaucoma Neg Hx     Macular degeneration Neg Hx     Retinal detachment Neg Hx     Strabismus Neg Hx     Stroke Neg Hx     Thyroid disease Neg Hx     Esophageal cancer Neg Hx     Rectal cancer Neg Hx     Stomach cancer Neg Hx     Ulcerative colitis Neg Hx     Crohn's disease Neg Hx     Irritable bowel syndrome Neg Hx     Celiac disease Neg Hx        Social History:  reports that she quit smoking about 12 years ago. Her smoking use included cigarettes. She started smoking about 56 years ago. She has a 11.00 pack-year smoking history. She has never used smokeless tobacco. She reports that she does not currently use alcohol. She reports that she does not use drugs.    Allergies:  Review of patient's allergies indicates:   Allergen Reactions    Lipitor [atorvastatin] Itching     Itching, elevated cpk, muscle aches, statin induced myositis        Medications:  Current Outpatient Medications   Medication Sig Dispense Refill    acyclovir (ZOVIRAX) 400 MG tablet Take 1 tablet (400 mg total) by mouth 2 (two) times daily. 60 tablet 2    albuterol (ACCUNEB) 1.25 mg/3 mL Nebu Take 3 mLs (1.25 mg total) by nebulization every 6 (six) hours as needed (wheeze/cough). Rescue 75 mL 2    amLODIPine (NORVASC) 5 MG tablet TAKE 1 TABLET BY MOUTH EVERY DAY 90 tablet 2    aspirin (ECOTRIN) 81 MG EC tablet TAKE 1 TABLET BY MOUTH EVERY DAY 90 tablet 3    cholecalciferol, vitamin D3, 125 mcg (5,000 unit) Tab 1 tablet DAILY (route: oral)      cyanocobalamin, vitamin B-12, 5,000 mcg Subl Place one under tongue once a day for 1 month, then continue to take under tongue per week 30 tablet 3    DARZALEX FASPRO 1,800 mg-30,000 unit/15 mL Soln       ENGERIX-B, PF, 20 mcg/mL Syrg       evolocumab (REPATHA SURECLICK) 140 mg/mL PnIj Inject 1 mL (140 mg total) into the skin every 14 (fourteen) days. 6 mL 3    ferrous gluconate 324 mg (37.5 mg iron) Tab tablet 1 tablet DAILY (route: oral)      gabapentin (NEURONTIN) 300 MG capsule Take 1 capsule (300 mg total) by mouth 2 (two) times daily. 180 capsule 2    GADAVIST 1 mmol/mL (604.72 mg/mL) Soln injection       metFORMIN (GLUCOPHAGE) 1000 MG tablet Take 1 tablet (1,000 mg total) by mouth 2 (two) times daily with meals. 180 tablet 3    omeprazole (PRILOSEC) 40 MG capsule TAKE 1 CAPSULE BY MOUTH EVERY DAY 90 capsule 3    OXYGEN-AIR DELIVERY SYSTEMS MISC 1-2 Liter O2 - CONTINUOUS (route: Oxygen)      pomalidomide 3 mg Cap TAKE 1 CAPSULE BY MOUTH ONCE DAILY ON DAYS 1-21 OF 28 DAY CYCLE.. 21 capsule 0    potassium chloride SA (K-DUR,KLOR-CON) 20 MEQ tablet       valsartan (DIOVAN) 160 MG tablet Take 1 tablet (160 mg total) by mouth once daily. 90 tablet 3    zoledronic 4 mg/100 mL PgBk infusion       zoledronic acid (ZOMETA) 4 mg/5 mL injection       FLUAD QUAD 2022-23,65Y UP,,PF, 60 mcg (15 mcg x 4)/0.5 mL Syrg        No current  facility-administered medications for this visit.     Facility-Administered Medications Ordered in Other Visits   Medication Dose Route Frequency Provider Last Rate Last Admin    diphenhydrAMINE injection 50 mg  50 mg Intravenous Once PRN Adina Wright NP        EPINEPHrine (EPIPEN) 0.3 mg/0.3 mL pen injection 0.3 mg  0.3 mg Intramuscular Once PRN Adina Wright NP        hydrocortisone sodium succinate injection 100 mg  100 mg Intravenous Once PRN Adina Wright NP          See HPI     ECOG Performance Status: 2 (due to remote  CVA)   Objective:      Vitals:   Vitals:    04/21/23 0920   BP: 135/62   Pulse: (!) 52   Resp: 20   Temp: 98.5 °F (36.9 °C)     General - somewhat frail appearing, no apparent distress  HEENT - oropharynx clear  Chest and Lung - clear to auscultation bilaterally   Cardiovascular - RRR with no MGR, normal S1 and S2  Abdomen-  soft, nontender, no palpable hepatomegaly or splenomegaly  Lymph - no palpable lymphadenopathy  Heme - no bruising, petechiae, pallor  Skin - no rashes or lesions  Psych - appropriate mood and affect       Laboratory Data:  Lab Visit on 04/21/2023   Component Date Value Ref Range Status    WBC 04/21/2023 2.36 (L)  3.90 - 12.70 K/uL Final    RBC 04/21/2023 4.20  4.00 - 5.40 M/uL Final    Hemoglobin 04/21/2023 8.6 (L)  12.0 - 16.0 g/dL Final    Hematocrit 04/21/2023 31.9 (L)  37.0 - 48.5 % Final    MCV 04/21/2023 76 (L)  82 - 98 fL Final    MCH 04/21/2023 20.5 (L)  27.0 - 31.0 pg Final    MCHC 04/21/2023 27.0 (L)  32.0 - 36.0 g/dL Final    RDW 04/21/2023 24.5 (H)  11.5 - 14.5 % Final    Platelets 04/21/2023 281  150 - 450 K/uL Final    MPV 04/21/2023 SEE COMMENT  9.2 - 12.9 fL Final    Result not available.    Immature Granulocytes 04/21/2023 0.0  0.0 - 0.5 % Final    Gran # (ANC) 04/21/2023 1.0 (L)  1.8 - 7.7 K/uL Final    Immature Grans (Abs) 04/21/2023 0.00  0.00 - 0.04 K/uL Final    Comment: Mild elevation in immature granulocytes is non specific and   can be seen  in a variety of conditions including stress response,   acute inflammation, trauma and pregnancy. Correlation with other   laboratory and clinical findings is essential.      Lymph # 04/21/2023 0.8 (L)  1.0 - 4.8 K/uL Final    Mono # 04/21/2023 0.4  0.3 - 1.0 K/uL Final    Eos # 04/21/2023 0.1  0.0 - 0.5 K/uL Final    Baso # 04/21/2023 0.08  0.00 - 0.20 K/uL Final    nRBC 04/21/2023 0  0 /100 WBC Final    Gran % 04/21/2023 40.7  38.0 - 73.0 % Final    Lymph % 04/21/2023 33.1  18.0 - 48.0 % Final    Mono % 04/21/2023 16.9 (H)  4.0 - 15.0 % Final    Eosinophil % 04/21/2023 5.9  0.0 - 8.0 % Final    Basophil % 04/21/2023 3.4 (H)  0.0 - 1.9 % Final    Differential Method 04/21/2023 Automated   Final    Sodium 04/21/2023 144  136 - 145 mmol/L Final    Potassium 04/21/2023 3.5  3.5 - 5.1 mmol/L Final    Chloride 04/21/2023 109  95 - 110 mmol/L Final    CO2 04/21/2023 26  23 - 29 mmol/L Final    Glucose 04/21/2023 80  70 - 110 mg/dL Final    BUN 04/21/2023 11  8 - 23 mg/dL Final    Creatinine 04/21/2023 0.9  0.5 - 1.4 mg/dL Final    Calcium 04/21/2023 9.1  8.7 - 10.5 mg/dL Final    Total Protein 04/21/2023 7.1  6.0 - 8.4 g/dL Final    Albumin 04/21/2023 4.0  3.5 - 5.2 g/dL Final    Total Bilirubin 04/21/2023 0.4  0.1 - 1.0 mg/dL Final    Comment: For infants and newborns, interpretation of results should be based  on gestational age, weight and in agreement with clinical  observations.    Premature Infant recommended reference ranges:  Up to 24 hours.............<8.0 mg/dL  Up to 48 hours............<12.0 mg/dL  3-5 days..................<15.0 mg/dL  6-29 days.................<15.0 mg/dL      Alkaline Phosphatase 04/21/2023 47 (L)  55 - 135 U/L Final    AST 04/21/2023 20  10 - 40 U/L Final    ALT 04/21/2023 9 (L)  10 - 44 U/L Final    Anion Gap 04/21/2023 9  8 - 16 mmol/L Final    eGFR 04/21/2023 >60.0  >60 mL/min/1.73 m^2 Final    IgG 04/21/2023 979  650 - 1600 mg/dL Final    IgG Cord Blood Reference Range: 650-1600  mg/dL.    IgA 04/21/2023 160  40 - 350 mg/dL Final    IgA Cord Blood Reference Range: <5 mg/dL.    IgM 04/21/2023 18 (L)  50 - 300 mg/dL Final    IgM Cord Blood Reference Range: <25 mg/dL.    Protein, Serum 04/21/2023 6.7  6.0 - 8.4 g/dL Final    Comment: Serum protein electrophoresis and immunofixation results should be   interpreted in clinical context in that some therapeutic agents can   result   in false positive results (example, daratumumab). Correlation with   the   patient s therapeutic regimen is required.      Hemoglobin A1C 04/21/2023 5.3  4.0 - 5.6 % Final    Comment: ADA Screening Guidelines:  5.7-6.4%  Consistent with prediabetes  >or=6.5%  Consistent with diabetes    High levels of fetal hemoglobin interfere with the HbA1C  assay. Heterozygous hemoglobin variants (HbS, HgC, etc)do  not significantly interfere with this assay.   However, presence of multiple variants may affect accuracy.      Estimated Avg Glucose 04/21/2023 105  68 - 131 mg/dL Final      Lab Results   Component Value Date    WBC 2.36 (L) 04/21/2023    HGB 8.6 (L) 04/21/2023    HCT 31.9 (L) 04/21/2023    MCV 76 (L) 04/21/2023     04/21/2023       Gran # (ANC)   Date Value Ref Range Status   04/21/2023 1.0 (L) 1.8 - 7.7 K/uL Final     Gran %   Date Value Ref Range Status   04/21/2023 40.7 38.0 - 73.0 % Final     Kappa Free Light Chains   Date Value Ref Range Status   03/24/2023 9.04 (H) 0.33 - 1.94 mg/dL Final   02/24/2023 10.19 (H) 0.33 - 1.94 mg/dL Final   01/27/2023 10.96 (H) 0.33 - 1.94 mg/dL Final     Lambda Free Light Chains   Date Value Ref Range Status   03/24/2023 2.06 0.57 - 2.63 mg/dL Final   02/24/2023 2.33 0.57 - 2.63 mg/dL Final   01/27/2023 3.08 (H) 0.57 - 2.63 mg/dL Final     Kappa/Lambda FLC Ratio   Date Value Ref Range Status   03/24/2023 4.39 (H) 0.26 - 1.65 Final     Comment:     Undetected antigen excess is a rare event but cannot   be excluded. If these free light chain results do not   agree with other  clinical or laboratory findings or   if the sample is from a patient that has previously   demonstrated antigen excess, discuss with the testing   laboratory.   Results should always be interpreted in conjunction   with other laboratory tests and clinical evidence.     02/24/2023 4.37 (H) 0.26 - 1.65 Final     Comment:     Undetected antigen excess is a rare event but cannot   be excluded. If these free light chain results do not   agree with other clinical or laboratory findings or   if the sample is from a patient that has previously   demonstrated antigen excess, discuss with the testing   laboratory.   Results should always be interpreted in conjunction   with other laboratory tests and clinical evidence.     01/27/2023 3.56 (H) 0.26 - 1.65 Final     Comment:     Undetected antigen excess is a rare event but cannot   be excluded. If these free light chain results do not   agree with other clinical or laboratory findings or   if the sample is from a patient that has previously   demonstrated antigen excess, discuss with the testing   laboratory.   Results should always be interpreted in conjunction   with other laboratory tests and clinical evidence.       IgG   Date Value Ref Range Status   04/21/2023 979 650 - 1600 mg/dL Final     Comment:     IgG Cord Blood Reference Range: 650-1600 mg/dL.     IgA   Date Value Ref Range Status   04/21/2023 160 40 - 350 mg/dL Final     Comment:     IgA Cord Blood Reference Range: <5 mg/dL.     IgM   Date Value Ref Range Status   04/21/2023 18 (L) 50 - 300 mg/dL Final     Comment:     IgM Cord Blood Reference Range: <25 mg/dL.        Imaging:      Assessment:       Ms. Thompson is a  73 y.o. femal with relapsed multiple myeloma.     Plan:     MM s/p Auto SCT  - high risk MM with 17p deletion  - 2  year 2 months  s/p Auto SCT  - started maintenance q2w velcade 5/6/20   - serial biochemical relapse  Noted and  given this and high risk CG , transitioned to Sravanthi/Noble (1mg/m2) /dex   "Salvage July 2021    - Due to uncontrolled hyperglycemia dex stopped after C4.    -supportive meds:   continue ppx acyclovir, asa; resume maintenance zometa q 3 months for 1 year; last dose on 08/04/22, next dose today.  -she  demonstrated mild biochemical progression over summer 2022 studies with no CRAB, in light of this and high risk CG   we transitioned her therapy from Sravanthi/Velcade to Sravanthi/pomalyst; intolerant to dex.   - biochemical studies from 2/24/23 cw with continued MT so plan to continue sravanthi/pom until intolerance or progression. Today's myeloma labs pending.   Pomalyst 4 mg dose adjusted to 3 mg due to worsening cytopenia(12/22/23).  Cytopenia improved, patient tolerating so continue current dose. Cytopenia persist today, she is completing this cycle pomalist tomorrow (04/22)    -recommended she take prn imodium for occasional loose stool with pomalyst   -already on asa 81mg   -post transplant vaccines completed     Anemia due to chemotherapy/JESSE  -due to therapy  -also significant iron deficiency noted; Feraheme denied by insurance, receiving venofer now. Receiving 2nd dose today, will receive 2 more doses. discussed small <1% risk of potentially serious and fatal allergic reactions with iv iron preparations and patient expressed understanding and wishes to proceed. Missed visit, will reschedule 2 more doses    Neuropathy  -Stable   - continues gabapentin and prn tramadol     SOB/descending aortic aneurysm  - CTA and dopplers ordered urgently with high concern for DVT/PE; completed 8/3/20  - incidental descending thoracic aortic aneurysm noted; referred to thoracic surgery; seen 8/7/20; per note: "at current size would not recommend any intervention as risk of rupture remains low.  Recommend surveillance CT chest every 12 months to monitor growth of the aneurysm.  Would typically recommend aspirin 81 mg daily in patients with aortic aneurysm  - ASA started (9/14/20)     HTN  - continue norvasc  - " Patient to monitor BP closely    Dementia/anxiety  - contniue fu with neuro/psych;  defer medication mgmt to them        DM2  - diet controlled    Hx of CVA  - s/p 2008, 2011    HLD with Statin Intolerance  -on praluent, PCSK9 inhibitor    L breast cancer DCIS stage 0  -s/p lumpectomy and radiation, no endocrine tx due to CVA       BMT Chart Routing      Follow up with physician    Follow up with CHRISTY 1 month. prior therapy   Provider visit type    Infusion scheduling note    Injection scheduling note iron infusion weekly x 2 weeks on next availability. Sravanthi, Zometa in 4 weeks   Labs   Scheduling:  Preferred lab:  Lab interval:  Cbc, cmp, immunoglobulin, light chains, SPEP, NATALY, Ferritin, hepatitis studies in 4 weeks   Imaging    Pharmacy appointment    Other referrals              Advance Care Planning     Date: 04/21/2023        We previously had discussions regarding her underlying diagnosis, natural history, prognosis, and treatment options.  She is continuing on maintenance medication. She remains interested in pursuing all treatment needed.  Will continue chemo therapy/ posttransplant monitoring.  Total time of this visit was 30 minutes, including time spent face to face with patient and also in preparing for today's visit for MDM and documentation. (Medical Decision Making, including consideration of possible diagnoses, management options, complex medical record review, review of diagnostic tests and information, consideration and discussion of significant complications based on comorbidities, and discussion with providers involved with the care of the patient). Greater than 50% was spent face to face with the patient counseling and coordinating care.    Adina Wright NP  Hematology/Oncology/BMT

## 2023-05-03 ENCOUNTER — PES CALL (OUTPATIENT)
Dept: ADMINISTRATIVE | Facility: CLINIC | Age: 73
End: 2023-05-03
Payer: MEDICARE

## 2023-05-03 ENCOUNTER — CLINICAL SUPPORT (OUTPATIENT)
Dept: HEMATOLOGY/ONCOLOGY | Facility: CLINIC | Age: 73
End: 2023-05-03
Payer: MEDICARE

## 2023-05-03 VITALS — BODY MASS INDEX: 26.98 KG/M2 | WEIGHT: 147.5 LBS

## 2023-05-03 DIAGNOSIS — Z71.3 NUTRITIONAL COUNSELING: ICD-10-CM

## 2023-05-03 DIAGNOSIS — C90.01 MULTIPLE MYELOMA IN REMISSION: Primary | ICD-10-CM

## 2023-05-03 DIAGNOSIS — E44.0 MODERATE MALNUTRITION: ICD-10-CM

## 2023-05-03 DIAGNOSIS — Z94.84 HISTORY OF AUTOLOGOUS STEM CELL TRANSPLANT: ICD-10-CM

## 2023-05-03 PROCEDURE — 99999 PR PBB SHADOW E&M-EST. PATIENT-LVL I: CPT | Mod: PBBFAC,HCNC,, | Performed by: DIETITIAN, REGISTERED

## 2023-05-03 PROCEDURE — 97803 PR MED NUTR THER, SUBSQ, INDIV, EA 15 MIN: ICD-10-PCS | Mod: HCNC,S$GLB,, | Performed by: DIETITIAN, REGISTERED

## 2023-05-03 PROCEDURE — 99999 PR PBB SHADOW E&M-EST. PATIENT-LVL I: ICD-10-PCS | Mod: PBBFAC,HCNC,, | Performed by: DIETITIAN, REGISTERED

## 2023-05-03 PROCEDURE — 97803 MED NUTRITION INDIV SUBSEQ: CPT | Mod: HCNC,S$GLB,, | Performed by: DIETITIAN, REGISTERED

## 2023-05-03 NOTE — PROGRESS NOTES
Oncology Nutrition Assessment for Medical Nutrition Therapy  Follow-up Visit    Shelby Thompson   1950    Referring Provider: Adina Wright NP      Reason for Visit: nutrition counseling and education    PMHx:   Past Medical History:   Diagnosis Date    Acute respiratory failure with hypoxia 4/30/2019    FUENTES (acute kidney injury) 5/1/2019    Allergy     Anemia     Aortic aneurysm     Breast cancer 10/2011    left breast Stage 0 DCIS    Cataract     Chronic diastolic congestive heart failure 11/6/2015    Colon polyp     Community acquired pneumonia of right lower lobe of lung 8/3/2021    GERD (gastroesophageal reflux disease)     Hiatal hernia     History of colonic polyps     Hx of psychiatric care     Zoloft    HX: breast cancer     Hyperlipemia     Hypertension     ICH (intracerebral hemorrhage)     Immunocompromised 10/28/2022    Major depressive disorder, single episode, mild 6/23/2016    Nuclear sclerosis 7/21/2014    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 7/21/2014    PAD (peripheral artery disease) 11/6/2015    Psychiatric problem     Statin-induced rhabdomyolysis     4/2015     Stroke 4/2011    Type 2 diabetes mellitus with diabetic peripheral angiopathy without gangrene, with long-term current use of insulin 3/31/2021    Type 2 diabetes mellitus without complication, without long-term current use of insulin 2/10/2020    Walker as ambulation aid        Nutrition Assessment    This is a 73 y.o.female with a medical diagnosis of multiple myeloma s/p auto SCT 2/12/2020. She relapsed at 1 year post SCT and was started on Daratumumab + Velcade + Dexamethasone. She remains on Daratumumab + Pomalyst + Dex salvage. She is known to me from previous appointment. She has lost 20-25lb over the past 1.5 years. She reports that her appetite is decreased but has actually improved some over the past couple weeks. She is still struggling with early satiety. She is eating 2 meals/day,  "breakfast and dinner. She reports these meals are small. She reports breakfast is especially difficult because she is drinking an Ensure first thing in the morning and it makes her feel too full. She is drinking another Ensure before bed. She is drinking 3 4oz-glasses of water/day. She also drinking a bottle of flavored water every day.    Weight: 66.9 kg (147 lb 7.8 oz)  Height: 5' 2" (157.5 cm)  BMI: Body mass index is 26.98 kg/m².   IBW: Ideal body weight: 50.1 kg (110 lb 7.2 oz)  Adjusted ideal body weight: 56.8 kg (125 lb 4.2 oz)    Usual BW: 180lb  Weight Change: 20-25lb loss over 1.5 years    Allergies: Lipitor [atorvastatin]    Current Medications:  Current Outpatient Medications:     acyclovir (ZOVIRAX) 400 MG tablet, Take 1 tablet (400 mg total) by mouth 2 (two) times daily., Disp: 60 tablet, Rfl: 2    albuterol (ACCUNEB) 1.25 mg/3 mL Nebu, Take 3 mLs (1.25 mg total) by nebulization every 6 (six) hours as needed (wheeze/cough). Rescue, Disp: 75 mL, Rfl: 2    amLODIPine (NORVASC) 5 MG tablet, TAKE 1 TABLET BY MOUTH EVERY DAY, Disp: 90 tablet, Rfl: 2    aspirin (ECOTRIN) 81 MG EC tablet, TAKE 1 TABLET BY MOUTH EVERY DAY, Disp: 90 tablet, Rfl: 3    cholecalciferol, vitamin D3, 125 mcg (5,000 unit) Tab, 1 tablet DAILY (route: oral), Disp: , Rfl:     cyanocobalamin, vitamin B-12, 5,000 mcg Subl, Place one under tongue once a day for 1 month, then continue to take under tongue per week, Disp: 30 tablet, Rfl: 3    DARZALEX FASPRO 1,800 mg-30,000 unit/15 mL Soln, , Disp: , Rfl:     ENGERIX-B, PF, 20 mcg/mL Syrg, , Disp: , Rfl:     evolocumab (REPATHA SURECLICK) 140 mg/mL PnIj, Inject 1 mL (140 mg total) into the skin every 14 (fourteen) days., Disp: 6 mL, Rfl: 3    ferrous gluconate 324 mg (37.5 mg iron) Tab tablet, 1 tablet DAILY (route: oral), Disp: , Rfl:     FLUAD QUAD 2022-23,65Y UP,,PF, 60 mcg (15 mcg x 4)/0.5 mL Syrg, , Disp: , Rfl:     gabapentin (NEURONTIN) 300 MG capsule, Take 1 capsule (300 mg total) by " mouth 2 (two) times daily., Disp: 180 capsule, Rfl: 2    GADAVIST 1 mmol/mL (604.72 mg/mL) Soln injection, , Disp: , Rfl:     metFORMIN (GLUCOPHAGE) 1000 MG tablet, Take 1 tablet (1,000 mg total) by mouth 2 (two) times daily with meals., Disp: 180 tablet, Rfl: 3    omeprazole (PRILOSEC) 40 MG capsule, TAKE 1 CAPSULE BY MOUTH EVERY DAY, Disp: 90 capsule, Rfl: 3    OXYGEN-AIR DELIVERY SYSTEMS MISC, 1-2 Liter O2 - CONTINUOUS (route: Oxygen), Disp: , Rfl:     pomalidomide 3 mg Cap, TAKE 1 CAPSULE BY MOUTH ONCE DAILY ON DAYS 1-21 OF 28 DAY CYCLE.., Disp: 21 capsule, Rfl: 0    potassium chloride SA (K-DUR,KLOR-CON) 20 MEQ tablet, , Disp: , Rfl:     valsartan (DIOVAN) 160 MG tablet, Take 1 tablet (160 mg total) by mouth once daily., Disp: 90 tablet, Rfl: 3    zoledronic 4 mg/100 mL PgBk infusion, , Disp: , Rfl:     zoledronic acid (ZOMETA) 4 mg/5 mL injection, , Disp: , Rfl:     Food/medication interactions noted: none    Vitamins/Supplements: none reported    Labs: Reviewed    Nutrition Diagnosis    Problem: moderate malnutrition  Etiology (related to): appetite loss and early satiety  Signs/Symptoms (as evidenced by): reports of inadequate PO intake, weight loss, and both fat & muscle wasting present    Nutrition Intervention    Nutrition Prescription   1806 kcals (27kcal/kg)  80g protein (1.2g/kg)   1806mL fluid (27mL/kg)    Recommendations:  Try eating 4-5 small meals/snacks daily  Make meals/snacks high in protein - examples reviewed  Continue to supplement with Ensure BID, switch to Ensure Plus  Drink morning Ensure after breakfast to avoid filling up too much before breakfast  Drink at least 64oz fluid/day    Materials Provided/Reviewed: none    Nutrition Monitoring and Evaluation    Monitor: diet education needs, energy intake and weight status    Goals: prevent further weight loss    Follow up: Patient provided with dietitian contact information and advised to call/message with questions or to make future  appointment if further intervention is needed.    Communication to referring provider/care team: note available in chart    Counseling time: 15 minutes    Becky Palencia MS, RD, LDN  [phone]

## 2023-05-05 ENCOUNTER — INFUSION (OUTPATIENT)
Dept: INFUSION THERAPY | Facility: HOSPITAL | Age: 73
End: 2023-05-05
Payer: MEDICARE

## 2023-05-05 VITALS
HEART RATE: 61 BPM | DIASTOLIC BLOOD PRESSURE: 63 MMHG | RESPIRATION RATE: 18 BRPM | SYSTOLIC BLOOD PRESSURE: 120 MMHG | TEMPERATURE: 98 F | OXYGEN SATURATION: 95 %

## 2023-05-05 DIAGNOSIS — D50.0 IRON DEFICIENCY ANEMIA DUE TO CHRONIC BLOOD LOSS: Primary | ICD-10-CM

## 2023-05-05 PROCEDURE — 96374 THER/PROPH/DIAG INJ IV PUSH: CPT

## 2023-05-05 PROCEDURE — 96375 TX/PRO/DX INJ NEW DRUG ADDON: CPT | Mod: HCNC

## 2023-05-05 PROCEDURE — 25000003 PHARM REV CODE 250: Mod: HCNC | Performed by: NURSE PRACTITIONER

## 2023-05-05 PROCEDURE — 63600175 PHARM REV CODE 636 W HCPCS: Mod: HCNC | Performed by: NURSE PRACTITIONER

## 2023-05-05 RX ORDER — HEPARIN 100 UNIT/ML
500 SYRINGE INTRAVENOUS
Status: DISCONTINUED | OUTPATIENT
Start: 2023-05-05 | End: 2023-05-05 | Stop reason: HOSPADM

## 2023-05-05 RX ORDER — SODIUM CHLORIDE 0.9 % (FLUSH) 0.9 %
10 SYRINGE (ML) INJECTION
Status: DISCONTINUED | OUTPATIENT
Start: 2023-05-05 | End: 2023-05-05 | Stop reason: HOSPADM

## 2023-05-05 RX ORDER — HEPARIN 100 UNIT/ML
500 SYRINGE INTRAVENOUS
Status: CANCELLED | OUTPATIENT
Start: 2023-05-12

## 2023-05-05 RX ORDER — SODIUM CHLORIDE 0.9 % (FLUSH) 0.9 %
10 SYRINGE (ML) INJECTION
Status: CANCELLED | OUTPATIENT
Start: 2023-05-12

## 2023-05-05 RX ADMIN — SODIUM CHLORIDE: 9 INJECTION, SOLUTION INTRAVENOUS at 09:05

## 2023-05-05 RX ADMIN — IRON SUCROSE 100 MG: 20 INJECTION, SOLUTION INTRAVENOUS at 09:05

## 2023-05-05 NOTE — PLAN OF CARE
0900: Pt arrived Venofer. Pt A&Ox4. VSS. Labs reviewed. PIV inserted. Positive blood return noted prior to infusion. All medications review and verbal understanding noted. Will continue to monitor.      0945: Pt tolerated treatment well. PIV discontinued. Pt reeducated on sign and symptoms of reaction and instructed to seek medical care if reaction noted. Pt denied AVS, will use MyChart. Pt ambulated out of clinic.

## 2023-05-08 ENCOUNTER — SPECIALTY PHARMACY (OUTPATIENT)
Dept: PHARMACY | Facility: CLINIC | Age: 73
End: 2023-05-08
Payer: MEDICARE

## 2023-05-08 ENCOUNTER — TELEPHONE (OUTPATIENT)
Dept: INTERNAL MEDICINE | Facility: CLINIC | Age: 73
End: 2023-05-08
Payer: MEDICARE

## 2023-05-08 NOTE — TELEPHONE ENCOUNTER
Specialty Pharmacy - Refill Coordination    Specialty Medication Orders Linked to Encounter      Flowsheet Row Most Recent Value   Medication #1 evolocumab (REPATHA SURECLICK) 140 mg/mL PnIj (Order#[id number], Rx#[id number])            Refill Questions - Documented Responses      Flowsheet Row Most Recent Value   Patient Availability and HIPAA Verification    Does patient want to proceed with activity? Yes   HIPAA/medical authority confirmed? Yes   Relationship to patient of person spoken to? Self   Refill Screening Questions    Would patient like to speak to a pharmacist? No   When does the patient need to receive the medication? 05/15/23   Refill Delivery Questions    How will the patient receive the medication? [facility]   When does the patient need to receive the medication? 05/15/23   Shipping Address Home   Address in [facility] confirmed and updated if neccessary? Yes   Expected Copay ($) 0   Is the patient able to afford the medication copay? Yes   Payment Method zero copay   Days supply of Refill 84   Supplies needed? No supplies needed   Refill activity completed? Yes   Refill activity plan Refill scheduled   Shipment/Pickup Date: 05/11/23            Current Outpatient Medications   Medication Sig    acyclovir (ZOVIRAX) 400 MG tablet Take 1 tablet (400 mg total) by mouth 2 (two) times daily.    albuterol (ACCUNEB) 1.25 mg/3 mL Nebu Take 3 mLs (1.25 mg total) by nebulization every 6 (six) hours as needed (wheeze/cough). Rescue    amLODIPine (NORVASC) 5 MG tablet TAKE 1 TABLET BY MOUTH EVERY DAY    aspirin (ECOTRIN) 81 MG EC tablet TAKE 1 TABLET BY MOUTH EVERY DAY    cholecalciferol, vitamin D3, 125 mcg (5,000 unit) Tab 1 tablet DAILY (route: oral)    cyanocobalamin, vitamin B-12, 5,000 mcg Subl Place one under tongue once a day for 1 month, then continue to take under tongue per week    DARZALEX FASPRO 1,800 mg-30,000 unit/15 mL Soln     ENGERIX-B, PF, 20 mcg/mL Syrg     evolocumab (REPATHA SURECLICK)  140 mg/mL PnIj Inject 1 mL (140 mg total) into the skin every 14 (fourteen) days.    ferrous gluconate 324 mg (37.5 mg iron) Tab tablet 1 tablet DAILY (route: oral)    FLUAD QUAD 2022-23,65Y UP,,PF, 60 mcg (15 mcg x 4)/0.5 mL Syrg     gabapentin (NEURONTIN) 300 MG capsule Take 1 capsule (300 mg total) by mouth 2 (two) times daily.    GADAVIST 1 mmol/mL (604.72 mg/mL) Soln injection     metFORMIN (GLUCOPHAGE) 1000 MG tablet Take 1 tablet (1,000 mg total) by mouth 2 (two) times daily with meals.    omeprazole (PRILOSEC) 40 MG capsule TAKE 1 CAPSULE BY MOUTH EVERY DAY    OXYGEN-AIR DELIVERY SYSTEMS MISC 1-2 Liter O2 - CONTINUOUS (route: Oxygen)    pomalidomide 3 mg Cap TAKE 1 CAPSULE BY MOUTH ONCE DAILY ON DAYS 1-21 OF 28 DAY CYCLE..    potassium chloride SA (K-DUR,KLOR-CON) 20 MEQ tablet     valsartan (DIOVAN) 160 MG tablet Take 1 tablet (160 mg total) by mouth once daily.    zoledronic 4 mg/100 mL PgBk infusion     zoledronic acid (ZOMETA) 4 mg/5 mL injection    Last reviewed on 4/21/2023 12:01 PM by Adina Wright NP    Review of patient's allergies indicates:   Allergen Reactions    Lipitor [atorvastatin] Itching     Itching, elevated cpk, muscle aches, statin induced myositis    Last reviewed on  5/5/2023 8:54 AM by Herminio Patel      Tasks added this encounter   No tasks added.   Tasks due within next 3 months   5/5/2023 - Refill Coordination Outreach (1 time occurrence)     [facility]  [address]  [facility] [address]  Phone: [phone]  Fax: [phone]

## 2023-05-08 NOTE — TELEPHONE ENCOUNTER
----- Message from Micki Gibbs sent at 5/8/2023  7:52 AM CDT -----  Contact: Ollie[employer]/   No auth req for an office visit for Dr. Amalia Gómez.refills# [id number]

## 2023-05-09 DIAGNOSIS — C90.00 MULTIPLE MYELOMA, REMISSION STATUS UNSPECIFIED: ICD-10-CM

## 2023-05-09 NOTE — TELEPHONE ENCOUNTER
"----- Message from Nancy Haynes sent at 5/9/2023  1:15 PM CDT -----  RX Name and Strength:pomalidomide 3 mg Cap      Preferred Pharmacy with phone number:     [facility] - [place], [place] - [address]  [address] [address]  Phone: [phone] Fax: [phone]      Local or Mail Order: Mail          Ordering Provider: Noman          Contact Preference: [phone]        Additional Information:  "Thank you for all that you do for our patients"    "

## 2023-05-19 ENCOUNTER — INFUSION (OUTPATIENT)
Dept: INFUSION THERAPY | Facility: HOSPITAL | Age: 73
End: 2023-05-19
Payer: MEDICARE

## 2023-05-19 ENCOUNTER — OFFICE VISIT (OUTPATIENT)
Dept: HEMATOLOGY/ONCOLOGY | Facility: CLINIC | Age: 73
End: 2023-05-19
Payer: MEDICARE

## 2023-05-19 VITALS
HEIGHT: 62 IN | RESPIRATION RATE: 16 BRPM | DIASTOLIC BLOOD PRESSURE: 75 MMHG | BODY MASS INDEX: 26.69 KG/M2 | SYSTOLIC BLOOD PRESSURE: 119 MMHG | WEIGHT: 145.06 LBS | HEART RATE: 68 BPM

## 2023-05-19 VITALS
RESPIRATION RATE: 18 BRPM | HEART RATE: 75 BPM | WEIGHT: 145.06 LBS | OXYGEN SATURATION: 100 % | DIASTOLIC BLOOD PRESSURE: 60 MMHG | TEMPERATURE: 98 F | SYSTOLIC BLOOD PRESSURE: 125 MMHG | BODY MASS INDEX: 26.69 KG/M2 | HEIGHT: 62 IN

## 2023-05-19 DIAGNOSIS — T50.905A DRUG-INDUCED CYTOPENIA: ICD-10-CM

## 2023-05-19 DIAGNOSIS — C90.00 MULTIPLE MYELOMA, REMISSION STATUS UNSPECIFIED: Primary | ICD-10-CM

## 2023-05-19 DIAGNOSIS — D50.0 IRON DEFICIENCY ANEMIA DUE TO CHRONIC BLOOD LOSS: ICD-10-CM

## 2023-05-19 DIAGNOSIS — D75.9 DRUG-INDUCED CYTOPENIA: ICD-10-CM

## 2023-05-19 DIAGNOSIS — Z94.84 HISTORY OF AUTO STEM CELL TRANSPLANT: ICD-10-CM

## 2023-05-19 DIAGNOSIS — C90.01 MULTIPLE MYELOMA IN REMISSION: Primary | ICD-10-CM

## 2023-05-19 DIAGNOSIS — D50.9 IRON DEFICIENCY ANEMIA, UNSPECIFIED IRON DEFICIENCY ANEMIA TYPE: ICD-10-CM

## 2023-05-19 DIAGNOSIS — C90.02 MULTIPLE MYELOMA IN RELAPSE: ICD-10-CM

## 2023-05-19 PROCEDURE — 63600175 PHARM REV CODE 636 W HCPCS: Mod: HCNC | Performed by: NURSE PRACTITIONER

## 2023-05-19 PROCEDURE — 3008F BODY MASS INDEX DOCD: CPT | Mod: HCNC,CPTII,, | Performed by: INTERNAL MEDICINE

## 2023-05-19 PROCEDURE — 4010F ACE/ARB THERAPY RXD/TAKEN: CPT | Mod: HCNC,CPTII,, | Performed by: INTERNAL MEDICINE

## 2023-05-19 PROCEDURE — 3078F DIAST BP <80 MM HG: CPT | Mod: HCNC,CPTII,, | Performed by: INTERNAL MEDICINE

## 2023-05-19 PROCEDURE — 3044F HG A1C LEVEL LT 7.0%: CPT | Mod: HCNC,CPTII,, | Performed by: INTERNAL MEDICINE

## 2023-05-19 PROCEDURE — 3044F PR MOST RECENT HEMOGLOBIN A1C LEVEL <7.0%: ICD-10-PCS | Mod: HCNC,CPTII,, | Performed by: INTERNAL MEDICINE

## 2023-05-19 PROCEDURE — 96367 TX/PROPH/DG ADDL SEQ IV INF: CPT | Mod: HCNC

## 2023-05-19 PROCEDURE — 25000003 PHARM REV CODE 250: Mod: HCNC | Performed by: NURSE PRACTITIONER

## 2023-05-19 PROCEDURE — 1126F PR PAIN SEVERITY QUANTIFIED, NO PAIN PRESENT: ICD-10-PCS | Mod: HCNC,CPTII,, | Performed by: INTERNAL MEDICINE

## 2023-05-19 PROCEDURE — 99999 PR PBB SHADOW E&M-EST. PATIENT-LVL III: ICD-10-PCS | Mod: PBBFAC,HCNC,, | Performed by: INTERNAL MEDICINE

## 2023-05-19 PROCEDURE — 1126F AMNT PAIN NOTED NONE PRSNT: CPT | Mod: HCNC,CPTII,, | Performed by: INTERNAL MEDICINE

## 2023-05-19 PROCEDURE — 3288F FALL RISK ASSESSMENT DOCD: CPT | Mod: HCNC,CPTII,, | Performed by: INTERNAL MEDICINE

## 2023-05-19 PROCEDURE — 3078F PR MOST RECENT DIASTOLIC BLOOD PRESSURE < 80 MM HG: ICD-10-PCS | Mod: HCNC,CPTII,, | Performed by: INTERNAL MEDICINE

## 2023-05-19 PROCEDURE — 99215 OFFICE O/P EST HI 40 MIN: CPT | Mod: HCNC,,, | Performed by: INTERNAL MEDICINE

## 2023-05-19 PROCEDURE — 99999 PR PBB SHADOW E&M-EST. PATIENT-LVL III: CPT | Mod: PBBFAC,HCNC,, | Performed by: INTERNAL MEDICINE

## 2023-05-19 PROCEDURE — 3008F PR BODY MASS INDEX (BMI) DOCUMENTED: ICD-10-PCS | Mod: HCNC,CPTII,, | Performed by: INTERNAL MEDICINE

## 2023-05-19 PROCEDURE — 99215 PR OFFICE/OUTPT VISIT, EST, LEVL V, 40-54 MIN: ICD-10-PCS | Mod: HCNC,,, | Performed by: INTERNAL MEDICINE

## 2023-05-19 PROCEDURE — 99213 OFFICE O/P EST LOW 20 MIN: CPT | Mod: 25,HCNC | Performed by: INTERNAL MEDICINE

## 2023-05-19 PROCEDURE — 3074F SYST BP LT 130 MM HG: CPT | Mod: HCNC,CPTII,, | Performed by: INTERNAL MEDICINE

## 2023-05-19 PROCEDURE — 1101F PT FALLS ASSESS-DOCD LE1/YR: CPT | Mod: HCNC,CPTII,, | Performed by: INTERNAL MEDICINE

## 2023-05-19 PROCEDURE — 63600175 PHARM REV CODE 636 W HCPCS: Mod: JZ,TB,HCNC | Performed by: INTERNAL MEDICINE

## 2023-05-19 PROCEDURE — 4010F PR ACE/ARB THEARPY RXD/TAKEN: ICD-10-PCS | Mod: HCNC,CPTII,, | Performed by: INTERNAL MEDICINE

## 2023-05-19 PROCEDURE — 96401 CHEMO ANTI-NEOPL SQ/IM: CPT | Mod: HCNC

## 2023-05-19 PROCEDURE — 1101F PR PT FALLS ASSESS DOC 0-1 FALLS W/OUT INJ PAST YR: ICD-10-PCS | Mod: HCNC,CPTII,, | Performed by: INTERNAL MEDICINE

## 2023-05-19 PROCEDURE — 3074F PR MOST RECENT SYSTOLIC BLOOD PRESSURE < 130 MM HG: ICD-10-PCS | Mod: HCNC,CPTII,, | Performed by: INTERNAL MEDICINE

## 2023-05-19 PROCEDURE — 96365 THER/PROPH/DIAG IV INF INIT: CPT

## 2023-05-19 PROCEDURE — 3288F PR FALLS RISK ASSESSMENT DOCUMENTED: ICD-10-PCS | Mod: HCNC,CPTII,, | Performed by: INTERNAL MEDICINE

## 2023-05-19 RX ORDER — HEPARIN 100 UNIT/ML
500 SYRINGE INTRAVENOUS
Status: CANCELLED | OUTPATIENT
Start: 2023-05-26

## 2023-05-19 RX ORDER — SODIUM CHLORIDE 0.9 % (FLUSH) 0.9 %
10 SYRINGE (ML) INJECTION
Status: CANCELLED | OUTPATIENT
Start: 2023-05-26

## 2023-05-19 RX ORDER — DIPHENHYDRAMINE HYDROCHLORIDE 50 MG/ML
50 INJECTION INTRAMUSCULAR; INTRAVENOUS ONCE AS NEEDED
Status: DISCONTINUED | OUTPATIENT
Start: 2023-05-19 | End: 2023-05-19 | Stop reason: HOSPADM

## 2023-05-19 RX ORDER — DIPHENHYDRAMINE HYDROCHLORIDE 50 MG/ML
50 INJECTION INTRAMUSCULAR; INTRAVENOUS ONCE AS NEEDED
Status: CANCELLED | OUTPATIENT
Start: 2023-05-19

## 2023-05-19 RX ORDER — EPINEPHRINE 0.3 MG/.3ML
0.3 INJECTION SUBCUTANEOUS ONCE AS NEEDED
Status: CANCELLED | OUTPATIENT
Start: 2023-05-19

## 2023-05-19 RX ORDER — SODIUM CHLORIDE 0.9 % (FLUSH) 0.9 %
10 SYRINGE (ML) INJECTION
Status: DISCONTINUED | OUTPATIENT
Start: 2023-05-19 | End: 2023-05-19 | Stop reason: HOSPADM

## 2023-05-19 RX ORDER — EPINEPHRINE 0.3 MG/.3ML
0.3 INJECTION SUBCUTANEOUS ONCE AS NEEDED
Status: DISCONTINUED | OUTPATIENT
Start: 2023-05-19 | End: 2023-05-19 | Stop reason: HOSPADM

## 2023-05-19 RX ORDER — HEPARIN 100 UNIT/ML
500 SYRINGE INTRAVENOUS
Status: DISCONTINUED | OUTPATIENT
Start: 2023-05-19 | End: 2023-05-19 | Stop reason: HOSPADM

## 2023-05-19 RX ADMIN — DARATUMUMAB AND HYALURONIDASE-FIHJ (HUMAN RECOMBINANT) 1800 MG: 1800; 30000 INJECTION SUBCUTANEOUS at 09:05

## 2023-05-19 RX ADMIN — IRON SUCROSE 100 MG: 20 INJECTION, SOLUTION INTRAVENOUS at 10:05

## 2023-05-19 NOTE — PLAN OF CARE
Pt received Venofer & Darzalex SQ today and tolerated well, without complications. VSS. Educated patient about Venofer & Darzalex SQ (indications, side effects, possible reactions, chemotherapy precautions) and verbalized understanding by pt and Fx. PIV positive for blood return, saline locked and removed prior to DC, catheter tip intact. Pt DC with no distress noted, ambulated off of unit w/ family, w/o event, pleased. Pt & Fx opted to not wait the obs period.

## 2023-05-19 NOTE — Clinical Note
-cbc, cmp, serum free light chains, quantitative immunoglobulins, serum electropheresis, serum immunofixation, NP  or MD  appt, and kenia injection, venofer infusion, and zometa infusion on 6/16

## 2023-05-19 NOTE — PROGRESS NOTES
PATIENT: Shelby Thompson  MRN: [id number]  DATE: 10/14/2021  Diagnosis:   Multiple Myeloma  Chief Complaint: MM f/u  Oncologic History: Per  primary Oncologist   Multiple Myeloma- presented w CRAB criteria (Hgb 7.1, hypercalcemia, renal function - Cr of 2.7, T7 vertebral fracture) and was diagnosed with IgG Kappa, RISS Stage III (beta-2 micro globulin of 17.5 and 14:20 translocation) High Risk disease.  She achieved and tolerated well VRd x completing 7, 28 day cycles and achieving deep VGPR to induction. Then underwent Melphalan autologous stem cell transplant on 2/12/2020.      Extensive PMH as below.    2 prior CVAs (one in 2008, one in 2011)  L breast cancer DCIS stage 0 (s/p lumpectomy and radiation, no endocrine tx due to CVA)  HTN  DM II  Neuropathy, b/l hands  Prior smoker, quit in 2011  Hepatic lesions - vascular per recent MRI in 09 /2019 with no changes; will confirm no further rascon needed from help  Statin Intolerance - on praluent, PCSK9 inhibitor  HFpEF - EF 55%, diastolic dysfunction  Anxiety/Depression     ADMISSION SUMMARY: 2/10-2/26/2020  Admitted 2/10, tolerated chemo and transplant well. Engrafted on day +12. Remained afebrile throughout hospital stay and no mucositis. Experienced expected side effects of nausea and diarrhea controlled with antiemetics and Imodium. Discharged home with home PT/OT       Subjective:       Initial History: Ms. Thompson is a 73 y.o. female who presents for virtual follow up.  She is 3yrs 3  months post AutoSCT. Relapsed  at 1 year post sct and was on Sravanthi/zhanna/dex until summer 2022 when changed to sravanthi/pom/dex due to biochemical only progression. Remains on sravanthi/pom/dex salvage. Tolerating dose adjusted pomalyst.  Tolerating symptomatically well and responding well to therapy.  Continued cytopenias. Her dementia is significant but stable; accompanied by her friend today.   Tolerating iron sucrose for her JESSE; injectafer and feraheme were refused by her  insurance.   Past Medical History:   Past Medical History:   Diagnosis Date    Acute respiratory failure with hypoxia 4/30/2019    FUENTES (acute kidney injury) 5/1/2019    Allergy     Anemia     Aortic aneurysm     Breast cancer 10/2011    left breast Stage 0 DCIS    Cataract     Chronic diastolic congestive heart failure 11/6/2015    Colon polyp     Community acquired pneumonia of right lower lobe of lung 8/3/2021    GERD (gastroesophageal reflux disease)     Hiatal hernia     History of colonic polyps     Hx of psychiatric care     Zoloft    HX: breast cancer     Hyperlipemia     Hypertension     ICH (intracerebral hemorrhage)     Immunocompromised 10/28/2022    Major depressive disorder, single episode, mild 6/23/2016    Nuclear sclerosis 7/21/2014    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 7/21/2014    PAD (peripheral artery disease) 11/6/2015    Psychiatric problem     Statin-induced rhabdomyolysis     4/2015     Stroke 4/2011    Type 2 diabetes mellitus with diabetic peripheral angiopathy without gangrene, with long-term current use of insulin 3/31/2021    Type 2 diabetes mellitus without complication, without long-term current use of insulin 2/10/2020    Walker as ambulation aid        Past Surgical HIstory:   Past Surgical History:   Procedure Laterality Date    APPENDECTOMY      BONE MARROW BIOPSY Left 10/3/2019    Procedure: Biopsy-bone marrow;  Surgeon: Jere Tineo MD;  Location: [facility] OR [address] [place] [address];  Service: Oncology;  Laterality: Left;    BREAST BIOPSY Left 10/2011    BREAST LUMPECTOMY Left 2011    DCIS    COLONOSCOPY      COLONOSCOPY N/A 1/9/2020    Procedure: COLONOSCOPY;  Surgeon: Quang De La Cruz MD;  Location: [facility] ([address] [place] [address]);  Service: Endoscopy;  Laterality: N/A;    CYST REMOVAL      back    ESOPHAGOGASTRODUODENOSCOPY  07/2016    duodenal angioectasia    ESOPHAGOGASTRODUODENOSCOPY N/A 1/9/2020    Procedure: EGD (ESOPHAGOGASTRODUODENOSCOPY);  Surgeon: Quang  ZOE De La Cruz MD;  Location: [facility] ([address] [place] [address]);  Service: Endoscopy;  Laterality: N/A;    FOOT FRACTURE SURGERY  10/2012    right foot, with R hallux valgus repair    FRACTURE SURGERY Right     broken toe repiar    HEMORRHOID SURGERY      LIVER BIOPSY  04/2015    essentially normal, no fibrosis    TUBAL LIGATION      UPPER GASTROINTESTINAL ENDOSCOPY         Family History:   Family History   Problem Relation Age of Onset    Dementia Sister         x1 sister    Cancer Sister 63        Lung Cancer    No Known Problems Mother     Cancer Father     No Known Problems Brother     Hypertension Daughter     Fibroids Daughter         uterine    Hypertension Son     No Known Problems Brother     No Known Problems Maternal Aunt     No Known Problems Maternal Uncle     No Known Problems Paternal Aunt     No Known Problems Paternal Uncle     Hypertension Maternal Grandmother     No Known Problems Maternal Grandfather     No Known Problems Paternal Grandmother     No Known Problems Paternal Grandfather     Ovarian cancer Neg Hx     Colon cancer Neg Hx     Tremor Neg Hx     Amblyopia Neg Hx     Blindness Neg Hx     Cataracts Neg Hx     Diabetes Neg Hx     Glaucoma Neg Hx     Macular degeneration Neg Hx     Retinal detachment Neg Hx     Strabismus Neg Hx     Stroke Neg Hx     Thyroid disease Neg Hx     Esophageal cancer Neg Hx     Rectal cancer Neg Hx     Stomach cancer Neg Hx     Ulcerative colitis Neg Hx     Crohn's disease Neg Hx     Irritable bowel syndrome Neg Hx     Celiac disease Neg Hx        Social History:  reports that she quit smoking about 12 years ago. Her smoking use included cigarettes. She started smoking about 56 years ago. She has a 11.00 pack-year smoking history. She has never used smokeless tobacco. She reports that she does not currently use alcohol. She reports that she does not use drugs.    Allergies:  Review of patient's allergies indicates:   Allergen Reactions    Lipitor [atorvastatin] Itching     Itching,  elevated cpk, muscle aches, statin induced myositis       Medications:  Current Outpatient Medications   Medication Sig Dispense Refill    acyclovir (ZOVIRAX) 400 MG tablet Take 1 tablet (400 mg total) by mouth 2 (two) times daily. 60 tablet 2    albuterol (ACCUNEB) 1.25 mg/3 mL Nebu Take 3 mLs (1.25 mg total) by nebulization every 6 (six) hours as needed (wheeze/cough). Rescue 75 mL 2    amLODIPine (NORVASC) 5 MG tablet TAKE 1 TABLET BY MOUTH EVERY DAY 90 tablet 2    aspirin (ECOTRIN) 81 MG EC tablet TAKE 1 TABLET BY MOUTH EVERY DAY 90 tablet 3    cholecalciferol, vitamin D3, 125 mcg (5,000 unit) Tab 1 tablet DAILY (route: oral)      cyanocobalamin, vitamin B-12, 5,000 mcg Subl Place one under tongue once a day for 1 month, then continue to take under tongue per week 30 tablet 3    DARZALEX FASPRO 1,800 mg-30,000 unit/15 mL Soln       ENGERIX-B, PF, 20 mcg/mL Syrg       evolocumab (REPATHA SURECLICK) 140 mg/mL PnIj Inject 1 mL (140 mg total) into the skin every 14 (fourteen) days. 6 mL 3    ferrous gluconate 324 mg (37.5 mg iron) Tab tablet 1 tablet DAILY (route: oral)      FLUAD QUAD 2022-23,65Y UP,,PF, 60 mcg (15 mcg x 4)/0.5 mL Syrg       gabapentin (NEURONTIN) 300 MG capsule Take 1 capsule (300 mg total) by mouth 2 (two) times daily. 180 capsule 2    GADAVIST 1 mmol/mL (604.72 mg/mL) Soln injection       metFORMIN (GLUCOPHAGE) 1000 MG tablet Take 1 tablet (1,000 mg total) by mouth 2 (two) times daily with meals. 180 tablet 3    omeprazole (PRILOSEC) 40 MG capsule TAKE 1 CAPSULE BY MOUTH EVERY DAY 90 capsule 3    OXYGEN-AIR DELIVERY SYSTEMS MISC 1-2 Liter O2 - CONTINUOUS (route: Oxygen)      pomalidomide 3 mg Cap TAKE 1 CAPSULE BY MOUTH ONCE DAILY ON DAYS 1-21 OF 28 DAY CYCLE.. 21 capsule 0    potassium chloride SA (K-DUR,KLOR-CON) 20 MEQ tablet       valsartan (DIOVAN) 160 MG tablet Take 1 tablet (160 mg total) by mouth once daily. 90 tablet 3    zoledronic 4 mg/100 mL PgBk infusion       zoledronic acid  (ZOMETA) 4 mg/5 mL injection        No current facility-administered medications for this visit.      See HPI     ECOG Performance Status: 2 (due to remote  CVA)   Objective:      Vitals:   Vitals:    05/19/23 0835   BP: 119/75   Pulse: 68   Resp: 16     General - somewhat frail appearing, no apparent distress  HEENT - oropharynx clear  Chest and Lung - clear to auscultation bilaterally   Cardiovascular - RRR with no MGR, normal S1 and S2  Abdomen-  soft, nontender, no palpable hepatomegaly or splenomegaly  Lymph - no palpable lymphadenopathy  Heme - no bruising, petechiae, pallor  Skin - no rashes or lesions  Psych - appropriate mood and affect       Laboratory Data:  Lab Results   Component Value Date    WBC 2.00 (L) 05/19/2023    HGB 9.1 (L) 05/19/2023    HCT 33.5 (L) 05/19/2023    MCV 77 (L) 05/19/2023     05/19/2023       Gran # (ANC)   Date Value Ref Range Status   05/19/2023 0.7 (L) 1.8 - 7.7 K/uL Final     Gran %   Date Value Ref Range Status   05/19/2023 34.2 (L) 38.0 - 73.0 % Final     CMP  Sodium   Date Value Ref Range Status   05/19/2023 141 136 - 145 mmol/L Final     Potassium   Date Value Ref Range Status   05/19/2023 3.8 3.5 - 5.1 mmol/L Final     Chloride   Date Value Ref Range Status   05/19/2023 105 95 - 110 mmol/L Final     CO2   Date Value Ref Range Status   05/19/2023 29 23 - 29 mmol/L Final     Glucose   Date Value Ref Range Status   05/19/2023 78 70 - 110 mg/dL Final     BUN   Date Value Ref Range Status   05/19/2023 9 8 - 23 mg/dL Final     Creatinine   Date Value Ref Range Status   05/19/2023 0.8 0.5 - 1.4 mg/dL Final     Calcium   Date Value Ref Range Status   05/19/2023 8.7 8.7 - 10.5 mg/dL Final     Total Protein   Date Value Ref Range Status   05/19/2023 7.1 6.0 - 8.4 g/dL Final     Albumin   Date Value Ref Range Status   05/19/2023 4.0 3.5 - 5.2 g/dL Final     Total Bilirubin   Date Value Ref Range Status   05/19/2023 0.5 0.1 - 1.0 mg/dL Final     Comment:     For infants and  newborns, interpretation of results should be based  on gestational age, weight and in agreement with clinical  observations.    Premature Infant recommended reference ranges:  Up to 24 hours.............<8.0 mg/dL  Up to 48 hours............<12.0 mg/dL  3-5 days..................<15.0 mg/dL  6-29 days.................<15.0 mg/dL       Alkaline Phosphatase   Date Value Ref Range Status   05/19/2023 38 (L) 55 - 135 U/L Final     AST   Date Value Ref Range Status   05/19/2023 13 10 - 40 U/L Final     ALT   Date Value Ref Range Status   05/19/2023 8 (L) 10 - 44 U/L Final     Anion Gap   Date Value Ref Range Status   05/19/2023 7 (L) 8 - 16 mmol/L Final     eGFR   Date Value Ref Range Status   05/19/2023 >60.0 >60 mL/min/1.73 m^2 Final     Kappa Free Light Chains   Date Value Ref Range Status   05/19/2023 6.75 (H) 0.33 - 1.94 mg/dL Final   04/21/2023 8.69 (H) 0.33 - 1.94 mg/dL Final   03/24/2023 9.04 (H) 0.33 - 1.94 mg/dL Final     Lambda Free Light Chains   Date Value Ref Range Status   05/19/2023 2.41 0.57 - 2.63 mg/dL Final   04/21/2023 2.30 0.57 - 2.63 mg/dL Final   03/24/2023 2.06 0.57 - 2.63 mg/dL Final     Kappa/Lambda FLC Ratio   Date Value Ref Range Status   05/19/2023 2.80 (H) 0.26 - 1.65 Final     Comment:     Undetected antigen excess is a rare event but cannot   be excluded. If these free light chain results do not   agree with other clinical or laboratory findings or   if the sample is from a patient that has previously   demonstrated antigen excess, discuss with the testing   laboratory.   Results should always be interpreted in conjunction   with other laboratory tests and clinical evidence.     04/21/2023 3.78 (H) 0.26 - 1.65 Final     Comment:     Undetected antigen excess is a rare event but cannot   be excluded. If these free light chain results do not   agree with other clinical or laboratory findings or   if the sample is from a patient that has previously   demonstrated antigen excess, discuss  with the testing   laboratory.   Results should always be interpreted in conjunction   with other laboratory tests and clinical evidence.     03/24/2023 4.39 (H) 0.26 - 1.65 Final     Comment:     Undetected antigen excess is a rare event but cannot   be excluded. If these free light chain results do not   agree with other clinical or laboratory findings or   if the sample is from a patient that has previously   demonstrated antigen excess, discuss with the testing   laboratory.   Results should always be interpreted in conjunction   with other laboratory tests and clinical evidence.       IgG   Date Value Ref Range Status   05/19/2023 975 650 - 1600 mg/dL Final     Comment:     IgG Cord Blood Reference Range: 650-1600 mg/dL.     IgA   Date Value Ref Range Status   05/19/2023 167 40 - 350 mg/dL Final     Comment:     IgA Cord Blood Reference Range: <5 mg/dL.     IgM   Date Value Ref Range Status   05/19/2023 16 (L) 50 - 300 mg/dL Final     Comment:     IgM Cord Blood Reference Range: <25 mg/dL.     Pathologist Interpretation SPE   Date Value Ref Range Status   04/21/2023 REVIEWED  Final     Comment:       Electronically reviewed and signed by:  Sreekanth Syed M.D.  Signed on 04/24/23 at 16:05  Normal total protein. Normal gamma globulins are decreased. There is   a paraprotein band in gamma = 0.24 g/dL, previously 0.29 g/dL.      03/24/2023 REVIEWED  Final     Comment:       Electronically reviewed and signed by:  Destiny Lopes M.D.  Signed on 03/28/23 at 10:37  Normal total protein. Normal gamma globulins are decreased.  There is a paraprotein band in gamma = 0.29 g/dL, previously 0.38   g/dL.      02/24/2023 REVIEWED  Final     Comment:       Electronically reviewed and signed by:  Shelli Haynes MD  Signed on 02/28/23 at 14:00  Normal total protein.   Normal gamma globulins are decreased.There is a paraprotein band in   gamma = 0.38 g/dL, previously 0.36 g/dL.        Pathologist Interpretation NATALY   Date  "Value Ref Range Status   04/21/2023 REVIEWED  Final     Comment:       Electronically reviewed and signed by:  Sreekanth Syed M.D.  Signed on 04/24/23 at 16:05  IgG kappa specific monoclonal protein present.             Imaging:      Assessment:       Ms. Thompson is a  73 y.o. femal with relapsed multiple myeloma.     Plan:     MM s/p Auto SCT  - high risk MM with 17p deletion  - 2  year 3 months  s/p Auto SCT  - started maintenance q2w velcade 5/6/20   - serial biochemical relapse  Noted and  given this and high risk CG , transitioned to Sravanthi/Noble (1mg/m2) /dex  Salvage July 2021    - Due to uncontrolled hyperglycemia dex stopped after C4.    -supportive meds:   continue ppx acyclovir, asa; resume maintenance zometa q 3 months for 1 year;  next dose in 1 month   -she  demonstrated mild biochemical progression over summer 2022 studies with no CRAB, in light of this and high risk CG   we transitioned her therapy from Sravanthi/Velcade to Sravanthi/pomalyst; intolerant to dex.   - biochemical studies from  5/19/23 cw with continued DC so plan to continue sravanthi/pom until intolerance or progression. Today's myeloma labs pending.   -plan to further Pomalyst down to 2mg (5/19/23) due to persistent neutropenia at 3mg    -recommended she take prn imodium for occasional loose stool with pomalyst   -already on asa 81mg   -post transplant vaccines completed     Anemia due to chemotherapy/JESSE  Due to therapy and JESSE  Received 3 of 8 venofer doses; proceed with dose 4 today; plan to complete the doses once a month when here for her sravanthi injection for convenience  Hgb improving    Neuropathy  -Stable   - continues gabapentin and prn tramadol     SOB/descending aortic aneurysm  - CTA and dopplers ordered urgently with high concern for DVT/PE; completed 8/3/20  - incidental descending thoracic aortic aneurysm noted; referred to thoracic surgery; seen 8/7/20; per note: "at current size would not recommend any intervention as risk of rupture remains " low.  Recommend surveillance CT chest every 12 months to monitor growth of the aneurysm.  Would typically recommend aspirin 81 mg daily in patients with aortic aneurysm  - ASA started (9/14/20)     HTN  - continue norvasc  - Patient to monitor BP closely    Dementia/anxiety  - contniue fu with neuro/psych;  defer medication mgmt to them        DM2  - diet controlled    Hx of CVA  - s/p 2008, 2011    HLD with Statin Intolerance  -on praluent, PCSK9 inhibitor    L breast cancer DCIS stage 0  -s/p lumpectomy and radiation, no endocrine tx due to CVA     FU:  -cbc, cmp, serum free light chains, quantitative immunoglobulins, serum electropheresis, serum immunofixation, NP  or MD  appt, and kenia injection, venofer infusion, and zometa infusion on 6/16/23

## 2023-05-20 DIAGNOSIS — C90.00 MULTIPLE MYELOMA, REMISSION STATUS UNSPECIFIED: ICD-10-CM

## 2023-05-22 ENCOUNTER — PATIENT MESSAGE (OUTPATIENT)
Dept: OPTOMETRY | Facility: CLINIC | Age: 73
End: 2023-05-22
Payer: MEDICARE

## 2023-05-23 RX ORDER — ACYCLOVIR 400 MG/1
TABLET ORAL
Qty: 180 TABLET | Refills: 3 | Status: SHIPPED | OUTPATIENT
Start: 2023-05-23

## 2023-05-24 DIAGNOSIS — C90.00 MULTIPLE MYELOMA, REMISSION STATUS UNSPECIFIED: ICD-10-CM

## 2023-05-24 DIAGNOSIS — D75.9 DRUG-INDUCED CYTOPENIA: ICD-10-CM

## 2023-05-24 DIAGNOSIS — T50.905A DRUG-INDUCED CYTOPENIA: ICD-10-CM

## 2023-05-24 NOTE — TELEPHONE ENCOUNTER
----- Message from Jennifer Gibbs sent at 5/24/2023  2:57 PM CDT -----  Regarding: Pharmacy assistance  Name of Pharmacy: [facility]    Name of caller: [facility]    Reason for Call: [facility] calling regarding pomalidomide (POMALYST) 2 mg Cap. It is missing [employer] Auth #. Please assist.    Callback number: [phone]

## 2023-05-25 DIAGNOSIS — D75.9 DRUG-INDUCED CYTOPENIA: ICD-10-CM

## 2023-05-25 DIAGNOSIS — C90.00 MULTIPLE MYELOMA, REMISSION STATUS UNSPECIFIED: ICD-10-CM

## 2023-05-25 DIAGNOSIS — T50.905A DRUG-INDUCED CYTOPENIA: ICD-10-CM

## 2023-06-01 ENCOUNTER — TELEPHONE (OUTPATIENT)
Dept: HEMATOLOGY/ONCOLOGY | Facility: CLINIC | Age: 73
End: 2023-06-01
Payer: MEDICARE

## 2023-06-01 NOTE — TELEPHONE ENCOUNTER
"----- Message from Brooke Coleman sent at 6/1/2023  1:53 PM CDT -----  Regarding: Consult/Advisory:      Name Of Caller:   Ms. Sotelo meredith/ [facility]      Contact Preference?:   [phone]      What is the nature of the call?: Requesting [employer] authorization number for the pt's pomalidomide (POMALYST) medication.      "Thank you for all that you do for our patients"     "

## 2023-06-07 DIAGNOSIS — D75.9 DRUG-INDUCED CYTOPENIA: ICD-10-CM

## 2023-06-07 DIAGNOSIS — C90.00 MULTIPLE MYELOMA, REMISSION STATUS UNSPECIFIED: ICD-10-CM

## 2023-06-07 DIAGNOSIS — T50.905A DRUG-INDUCED CYTOPENIA: ICD-10-CM

## 2023-06-11 DIAGNOSIS — I10 ESSENTIAL HYPERTENSION: ICD-10-CM

## 2023-06-12 RX ORDER — VALSARTAN 160 MG/1
160 TABLET ORAL DAILY
Qty: 90 TABLET | Refills: 3 | Status: SHIPPED | OUTPATIENT
Start: 2023-06-12

## 2023-06-12 NOTE — TELEPHONE ENCOUNTER
No care due was identified.  [facility] Embedded Care Due Messages. Reference number: [id number].   6/11/2023 9:45:08 PM CDT

## 2023-06-16 ENCOUNTER — INFUSION (OUTPATIENT)
Dept: INFUSION THERAPY | Facility: HOSPITAL | Age: 73
End: 2023-06-16
Payer: MEDICARE

## 2023-06-16 VITALS
OXYGEN SATURATION: 98 % | SYSTOLIC BLOOD PRESSURE: 130 MMHG | TEMPERATURE: 98 F | RESPIRATION RATE: 18 BRPM | WEIGHT: 140 LBS | BODY MASS INDEX: 25.76 KG/M2 | DIASTOLIC BLOOD PRESSURE: 69 MMHG | HEIGHT: 62 IN | HEART RATE: 55 BPM

## 2023-06-16 DIAGNOSIS — Z94.81 STATUS POST AUTOLOGOUS BONE MARROW TRANSPLANT: ICD-10-CM

## 2023-06-16 DIAGNOSIS — C90.01 MULTIPLE MYELOMA IN REMISSION: Primary | ICD-10-CM

## 2023-06-16 DIAGNOSIS — C90.02 MULTIPLE MYELOMA IN RELAPSE: ICD-10-CM

## 2023-06-16 PROCEDURE — A4216 STERILE WATER/SALINE, 10 ML: HCPCS | Mod: HCNC | Performed by: NURSE PRACTITIONER

## 2023-06-16 PROCEDURE — 96374 THER/PROPH/DIAG INJ IV PUSH: CPT | Mod: HCNC

## 2023-06-16 PROCEDURE — 63600175 PHARM REV CODE 636 W HCPCS: Mod: HCNC | Performed by: NURSE PRACTITIONER

## 2023-06-16 PROCEDURE — 96401 CHEMO ANTI-NEOPL SQ/IM: CPT | Mod: HCNC

## 2023-06-16 PROCEDURE — 96367 TX/PROPH/DG ADDL SEQ IV INF: CPT | Mod: HCNC

## 2023-06-16 PROCEDURE — 25000003 PHARM REV CODE 250: Mod: HCNC | Performed by: NURSE PRACTITIONER

## 2023-06-16 PROCEDURE — 63600175 PHARM REV CODE 636 W HCPCS: Mod: JZ,TB,HCNC | Performed by: INTERNAL MEDICINE

## 2023-06-16 RX ORDER — DIPHENHYDRAMINE HYDROCHLORIDE 50 MG/ML
50 INJECTION INTRAMUSCULAR; INTRAVENOUS ONCE AS NEEDED
Status: CANCELLED | OUTPATIENT
Start: 2023-06-24

## 2023-06-16 RX ORDER — EPINEPHRINE 0.3 MG/.3ML
0.3 INJECTION SUBCUTANEOUS ONCE AS NEEDED
Status: CANCELLED | OUTPATIENT
Start: 2023-06-24

## 2023-06-16 RX ORDER — SODIUM CHLORIDE 0.9 % (FLUSH) 0.9 %
10 SYRINGE (ML) INJECTION
Status: CANCELLED | OUTPATIENT
Start: 2023-06-16

## 2023-06-16 RX ORDER — ZOLEDRONIC ACID 0.04 MG/ML
4 INJECTION, SOLUTION INTRAVENOUS
Status: CANCELLED | OUTPATIENT
Start: 2023-06-16

## 2023-06-16 RX ORDER — DIPHENHYDRAMINE HYDROCHLORIDE 50 MG/ML
50 INJECTION INTRAMUSCULAR; INTRAVENOUS ONCE AS NEEDED
Status: DISCONTINUED | OUTPATIENT
Start: 2023-06-16 | End: 2023-06-16 | Stop reason: HOSPADM

## 2023-06-16 RX ORDER — HEPARIN 100 UNIT/ML
500 SYRINGE INTRAVENOUS
Status: CANCELLED | OUTPATIENT
Start: 2023-06-16

## 2023-06-16 RX ORDER — ZOLEDRONIC ACID 0.04 MG/ML
4 INJECTION, SOLUTION INTRAVENOUS
Status: COMPLETED | OUTPATIENT
Start: 2023-06-16 | End: 2023-06-16

## 2023-06-16 RX ORDER — SODIUM CHLORIDE 0.9 % (FLUSH) 0.9 %
10 SYRINGE (ML) INJECTION
Status: DISCONTINUED | OUTPATIENT
Start: 2023-06-16 | End: 2023-06-16 | Stop reason: HOSPADM

## 2023-06-16 RX ORDER — EPINEPHRINE 0.3 MG/.3ML
0.3 INJECTION SUBCUTANEOUS ONCE AS NEEDED
Status: DISCONTINUED | OUTPATIENT
Start: 2023-06-16 | End: 2023-06-16 | Stop reason: HOSPADM

## 2023-06-16 RX ADMIN — SODIUM CHLORIDE: 9 INJECTION, SOLUTION INTRAVENOUS at 08:06

## 2023-06-16 RX ADMIN — DARATUMUMAB AND HYALURONIDASE-FIHJ (HUMAN RECOMBINANT) 1800 MG: 1800; 30000 INJECTION SUBCUTANEOUS at 09:06

## 2023-06-16 RX ADMIN — ZOLEDRONIC ACID 4 MG: 0.04 INJECTION, SOLUTION INTRAVENOUS at 09:06

## 2023-06-16 RX ADMIN — Medication 10 ML: at 09:06

## 2023-06-16 NOTE — PLAN OF CARE
Pt tolerated chemo and Zometa well. No adverse reaction noted. Pt education reinforced on chemo regimen, side effects, what to expect, and when to call . Pt verbalized understanding. I reviewed pt calendar w/ pt and understanding verbalized.

## 2023-06-19 ENCOUNTER — OFFICE VISIT (OUTPATIENT)
Dept: HOME HEALTH SERVICES | Facility: CLINIC | Age: 73
End: 2023-06-19
Payer: MEDICARE

## 2023-06-19 VITALS
SYSTOLIC BLOOD PRESSURE: 118 MMHG | WEIGHT: 140 LBS | OXYGEN SATURATION: 98 % | TEMPERATURE: 97 F | HEIGHT: 62 IN | HEART RATE: 78 BPM | DIASTOLIC BLOOD PRESSURE: 60 MMHG | BODY MASS INDEX: 25.76 KG/M2 | RESPIRATION RATE: 16 BRPM

## 2023-06-19 DIAGNOSIS — C90.00 METASTATIC MULTIPLE MYELOMA TO BONE: ICD-10-CM

## 2023-06-19 DIAGNOSIS — I73.9 PAD (PERIPHERAL ARTERY DISEASE): ICD-10-CM

## 2023-06-19 DIAGNOSIS — E11.51 TYPE 2 DIABETES MELLITUS WITH DIABETIC PERIPHERAL ANGIOPATHY WITHOUT GANGRENE, WITH LONG-TERM CURRENT USE OF INSULIN: ICD-10-CM

## 2023-06-19 DIAGNOSIS — I77.1 TORTUOUS AORTA: ICD-10-CM

## 2023-06-19 DIAGNOSIS — I71.23 ANEURYSM OF DESCENDING THORACIC AORTA WITHOUT RUPTURE: ICD-10-CM

## 2023-06-19 DIAGNOSIS — I77.819 ECTATIC AORTA: ICD-10-CM

## 2023-06-19 DIAGNOSIS — E78.5 HYPERLIPIDEMIA ASSOCIATED WITH TYPE 2 DIABETES MELLITUS: ICD-10-CM

## 2023-06-19 DIAGNOSIS — C90.02 MULTIPLE MYELOMA IN RELAPSE: ICD-10-CM

## 2023-06-19 DIAGNOSIS — I15.2 HYPERTENSION ASSOCIATED WITH DIABETES: ICD-10-CM

## 2023-06-19 DIAGNOSIS — F01.A18 MILD VASCULAR DEMENTIA WITH OTHER BEHAVIORAL DISTURBANCE: ICD-10-CM

## 2023-06-19 DIAGNOSIS — Z00.00 ENCOUNTER FOR PREVENTIVE HEALTH EXAMINATION: Primary | ICD-10-CM

## 2023-06-19 DIAGNOSIS — D84.9 IMMUNOCOMPROMISED: ICD-10-CM

## 2023-06-19 DIAGNOSIS — E46 PROTEIN-CALORIE MALNUTRITION, UNSPECIFIED SEVERITY: ICD-10-CM

## 2023-06-19 DIAGNOSIS — Z99.89 DEPENDENCE ON OTHER ENABLING MACHINES AND DEVICES: ICD-10-CM

## 2023-06-19 DIAGNOSIS — Z79.4 TYPE 2 DIABETES MELLITUS WITH DIABETIC PERIPHERAL ANGIOPATHY WITHOUT GANGRENE, WITH LONG-TERM CURRENT USE OF INSULIN: ICD-10-CM

## 2023-06-19 DIAGNOSIS — I70.0 AORTIC ATHEROSCLEROSIS: ICD-10-CM

## 2023-06-19 DIAGNOSIS — E11.69 HYPERLIPIDEMIA ASSOCIATED WITH TYPE 2 DIABETES MELLITUS: ICD-10-CM

## 2023-06-19 DIAGNOSIS — F33.42 RECURRENT MAJOR DEPRESSIVE DISORDER, IN FULL REMISSION: ICD-10-CM

## 2023-06-19 DIAGNOSIS — I27.9 PULMONARY HEART DISEASE: ICD-10-CM

## 2023-06-19 DIAGNOSIS — G62.0 DRUG-INDUCED POLYNEUROPATHY: ICD-10-CM

## 2023-06-19 DIAGNOSIS — I82.0 BUDD-CHIARI SYNDROME: ICD-10-CM

## 2023-06-19 DIAGNOSIS — M85.80 OSTEOPENIA, UNSPECIFIED LOCATION: ICD-10-CM

## 2023-06-19 DIAGNOSIS — I69.351 HEMIPLEGIA AND HEMIPARESIS FOLLOWING CEREBRAL INFARCTION AFFECTING RIGHT DOMINANT SIDE: ICD-10-CM

## 2023-06-19 DIAGNOSIS — Z94.84 HISTORY OF AUTO STEM CELL TRANSPLANT: ICD-10-CM

## 2023-06-19 DIAGNOSIS — Z00.00 ENCOUNTER FOR MEDICARE ANNUAL WELLNESS EXAM: ICD-10-CM

## 2023-06-19 DIAGNOSIS — E11.59 HYPERTENSION ASSOCIATED WITH DIABETES: ICD-10-CM

## 2023-06-19 DIAGNOSIS — J44.9 CHRONIC OBSTRUCTIVE PULMONARY DISEASE, UNSPECIFIED COPD TYPE: ICD-10-CM

## 2023-06-19 DIAGNOSIS — R26.9 ABNORMALITY OF GAIT AND MOBILITY: ICD-10-CM

## 2023-06-19 PROBLEM — F01.A0 MILD VASCULAR DEMENTIA: Status: ACTIVE | Noted: 2023-06-19

## 2023-06-19 PROCEDURE — 1126F PR PAIN SEVERITY QUANTIFIED, NO PAIN PRESENT: ICD-10-PCS | Mod: CPTII,S$GLB,,

## 2023-06-19 PROCEDURE — 1160F RVW MEDS BY RX/DR IN RCRD: CPT | Mod: CPTII,S$GLB,,

## 2023-06-19 PROCEDURE — 1126F AMNT PAIN NOTED NONE PRSNT: CPT | Mod: CPTII,S$GLB,,

## 2023-06-19 PROCEDURE — 99499 UNLISTED E&M SERVICE: CPT | Mod: S$GLB,,,

## 2023-06-19 PROCEDURE — 3008F BODY MASS INDEX DOCD: CPT | Mod: CPTII,S$GLB,,

## 2023-06-19 PROCEDURE — 1160F PR REVIEW ALL MEDS BY PRESCRIBER/CLIN PHARMACIST DOCUMENTED: ICD-10-PCS | Mod: CPTII,S$GLB,,

## 2023-06-19 PROCEDURE — 3078F PR MOST RECENT DIASTOLIC BLOOD PRESSURE < 80 MM HG: ICD-10-PCS | Mod: CPTII,S$GLB,,

## 2023-06-19 PROCEDURE — 1101F PR PT FALLS ASSESS DOC 0-1 FALLS W/OUT INJ PAST YR: ICD-10-PCS | Mod: CPTII,S$GLB,,

## 2023-06-19 PROCEDURE — 3008F PR BODY MASS INDEX (BMI) DOCUMENTED: ICD-10-PCS | Mod: CPTII,S$GLB,,

## 2023-06-19 PROCEDURE — 4010F ACE/ARB THERAPY RXD/TAKEN: CPT | Mod: CPTII,S$GLB,,

## 2023-06-19 PROCEDURE — 99499 RISK ADDL DX/OHS AUDIT: ICD-10-PCS | Mod: S$GLB,,,

## 2023-06-19 PROCEDURE — G0439 PPPS, SUBSEQ VISIT: HCPCS | Mod: S$GLB,,,

## 2023-06-19 PROCEDURE — 1101F PT FALLS ASSESS-DOCD LE1/YR: CPT | Mod: CPTII,S$GLB,,

## 2023-06-19 PROCEDURE — 1170F FXNL STATUS ASSESSED: CPT | Mod: CPTII,S$GLB,,

## 2023-06-19 PROCEDURE — 3044F PR MOST RECENT HEMOGLOBIN A1C LEVEL <7.0%: ICD-10-PCS | Mod: CPTII,S$GLB,,

## 2023-06-19 PROCEDURE — 3074F PR MOST RECENT SYSTOLIC BLOOD PRESSURE < 130 MM HG: ICD-10-PCS | Mod: CPTII,S$GLB,,

## 2023-06-19 PROCEDURE — 1159F PR MEDICATION LIST DOCUMENTED IN MEDICAL RECORD: ICD-10-PCS | Mod: CPTII,S$GLB,,

## 2023-06-19 PROCEDURE — 1159F MED LIST DOCD IN RCRD: CPT | Mod: CPTII,S$GLB,,

## 2023-06-19 PROCEDURE — 4010F PR ACE/ARB THEARPY RXD/TAKEN: ICD-10-PCS | Mod: CPTII,S$GLB,,

## 2023-06-19 PROCEDURE — 3288F FALL RISK ASSESSMENT DOCD: CPT | Mod: CPTII,S$GLB,,

## 2023-06-19 PROCEDURE — 3078F DIAST BP <80 MM HG: CPT | Mod: CPTII,S$GLB,,

## 2023-06-19 PROCEDURE — G0439 PR MEDICARE ANNUAL WELLNESS SUBSEQUENT VISIT: ICD-10-PCS | Mod: S$GLB,,,

## 2023-06-19 PROCEDURE — 3044F HG A1C LEVEL LT 7.0%: CPT | Mod: CPTII,S$GLB,,

## 2023-06-19 PROCEDURE — 3288F PR FALLS RISK ASSESSMENT DOCUMENTED: ICD-10-PCS | Mod: CPTII,S$GLB,,

## 2023-06-19 PROCEDURE — 3074F SYST BP LT 130 MM HG: CPT | Mod: CPTII,S$GLB,,

## 2023-06-19 PROCEDURE — 1170F PR FUNCTIONAL STATUS ASSESSED: ICD-10-PCS | Mod: CPTII,S$GLB,,

## 2023-06-19 NOTE — Clinical Note
Medicare AWV completed. Patient has not been taking iron. Unsure if she is supposed to be taking iron or not. Please discuss with patient. Thanks!  --KISHOR Arce

## 2023-06-19 NOTE — PROGRESS NOTES
"Shelby Thompson presented for a  Medicare AWV and comprehensive Health Risk Assessment today. The following components were reviewed and updated:    Medical history  Family History  Social history  Allergies and Current Medications  Health Risk Assessment  Health Maintenance  Care Team         ** See Completed Assessments for Annual Wellness Visit within the encounter summary.**         The following assessments were completed:  Living Situation  CAGE  Depression Screening  Timed Get Up and Go: Deferred, impaired mobility  Whisper Test  Cognitive Function Screening: Deferred, cognitive impairment  Nutrition Screening  ADL Screening  PAQ Screening        Vitals:    06/19/23 1100   BP: 118/60   BP Location: Left arm   Patient Position: Sitting   Pulse: 78   Resp: 16   Temp: 97 °F (36.1 °C)   SpO2: 98%   Weight: 63.5 kg (139 lb 15.9 oz)   Height: 5' 2" (1.575 m)     Body mass index is 25.6 kg/m².    Physical Exam  Vitals reviewed.   Constitutional:       General: She is not in acute distress.     Appearance: Normal appearance.   Cardiovascular:      Rate and Rhythm: Normal rate and regular rhythm.      Pulses: Normal pulses.      Heart sounds: No murmur heard.  Pulmonary:      Effort: Pulmonary effort is normal. No respiratory distress.      Breath sounds: Normal breath sounds.   Abdominal:      General: Bowel sounds are normal.   Musculoskeletal:      Right lower leg: No edema.      Left lower leg: No edema.   Neurological:      General: No focal deficit present.      Mental Status: She is alert and oriented to person, place, and time.      Motor: Tremor present.      Gait: Gait abnormal (uses walker).   Psychiatric:         Mood and Affect: Mood normal.         Behavior: Behavior normal.             Diagnoses and health risks identified today and associated recommendations/orders:    1. Encounter for preventive health examination  Assessments completed.  HM recommendations reviewed.  F/u with PCP as instructed. "     Patient not on chronic opioids.   Risk factors reviewed for any potential opioid use disorder   Pain evaluated during visit.  Current treatment plan documented.  Will refer to specialist, as appropriate.      2. Encounter for Medicare annual wellness exam  Referred by PCP.   - Ambulatory Referral/Consult to Enhanced Annual Wellness Visit (eAWV)    3. History of auto stem cell transplant  Chronic, stable on current regimen. Followed by Hem/Onc.     4. Chronic obstructive pulmonary disease, unspecified COPD type  Chronic, stable on current Albuterol regimen. Followed by PCP.     5. Aortic atherosclerosis  Chronic, stable on current regimen. Not on statin. Followed by PCP.     6. Type 2 diabetes mellitus with diabetic peripheral angiopathy without gangrene, with long-term current use of insulin  Chronic, stable on current Metformin regimen. Followed by PCP.   Lab Results   Component Value Date    HGBA1C 5.3 04/21/2023     7. Budd-Chiari syndrome  Chronic, stable on current regimen. Followed by PCP.     8. Tortuous aorta  Chronic, stable on current regimen. Followed by PCP.     9. Ectatic aorta  Chronic, stable on current regimen. Followed by PCP.     10. PAD (peripheral artery disease)  Chronic, stable on current regimen. Followed by PCP.     11. Aneurysm of descending thoracic aorta without rupture  Chronic, stable on current regimen. Followed by PCP.     12. Hemiplegia and hemiparesis following cerebral infarction affecting right dominant side  Chronic, stable on current regimen. Uses walker. Followed by PCP.     13. Pulmonary heart disease  Chronic, stable on current regimen. Followed by PCP.     14. Drug-induced polyneuropathy  Chronic, stable on current regimen. Followed by Hem/Onc.     15. Recurrent major depressive disorder, in full remission  Chronic, stable on current regimen. Followed by PCP.     16. 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 Followed by  PCP.     17. Immunocompromised  Chronic, stable on current regimen. Followed by Hem/Onc.     18. Protein-calorie malnutrition, unspecified severity  Chronic, stable on current regimen. Followed by PCP.     19. Hypertension associated with diabetes  Chronic, stable on current Amlodipine, Valsartan regimen. Followed by PCP.     20. Hyperlipidemia associated with type 2 diabetes mellitus  Chronic, stable on current regimen. Not on statin. Followed by PCP.     21. Multiple myeloma in relapse  Chronic, stable on current regimen. Followed by Hem/Onc.     22. Metastatic multiple myeloma to bone  Chronic, stable on current regimen. Followed by Hem/Onc.     23. Osteopenia, unspecified location  Chronic, stable on current Vit D regimen. Followed by PCP.     24. Abnormality of gait and mobility  Uses wheeled walker.     25. Dependence on other enabling machines and devices  Uses wheeled walker.       Provided Shelby with a 5-10 year written screening schedule and personal prevention plan. Recommendations were developed using the USPSTF age appropriate recommendations. Education, counseling, and referrals were provided as needed. After Visit Summary printed and given to patient which includes a list of additional screenings\tests needed.    Follow up in about 1 year (around 6/19/2024) for Medicare AWV.    Yazmin Gómez NP    I offered to discuss advanced care planning, including how to pick a person who would make decisions for you if you were unable to make them for yourself, called a health care power of , and what kind of decisions you might make such as use of life sustaining treatments such as ventilators and tube feeding when faced with a life limiting illness recorded on a living will that they will need to know. (How you want to be cared for as you near the end of your natural life)     X Patient is interested in learning more about how to make advanced directives.  I provided them paperwork and offered  to discuss this with them.

## 2023-06-19 NOTE — PATIENT INSTRUCTIONS
Counseling and Referral of Other Preventative  (Italic type indicates deductible and co-insurance are waived)    Patient Name: Shelby Thompson  Today's Date: 6/19/2023    Health Maintenance       Date Due Completion Date    Diabetes Urine Screening Never done ---    COVID-19 Vaccine (4 - Moderna series) 05/17/2023 1/17/2023    DEXA Scan 07/06/2023 7/6/2021    Shingles Vaccine (1 of 2) 10/28/2023 (Originally 3/25/1969) ---    Hemoglobin A1c 10/21/2023 4/21/2023    Lipid Panel 01/10/2024 1/10/2023    Eye Exam 01/10/2024 1/10/2023    Mammogram 02/02/2024 2/2/2023    Foot Exam 04/18/2024 4/18/2023    Override on 4/18/2023: Done    Override on 4/18/2022: Done    Override on 6/3/2021: Done    Override on 6/4/2020: Done    Colorectal Cancer Screening 01/09/2025 1/9/2020    TETANUS VACCINE 06/23/2026 6/23/2016        No orders of the defined types were placed in this encounter.    The following information is provided to all patients.  This information is to help you find resources for any of the problems found today that may be affecting your health:                Living healthy guide: www.[facility].[place].gov      Understanding Diabetes: www.diabetes.org      Eating healthy: www.cdc.gov/healthyweight      CDC home safety checklist: www.cdc.gov/steadi/patient.html      Agency on Aging: www.goea.[place].[facility]      Alcoholics anonymous (AA): www.aa.org      Physical Activity: www.joseline.nih.gov/dx6uqol      Tobacco use: www.quitwithusla.org

## 2023-06-20 ENCOUNTER — PATIENT MESSAGE (OUTPATIENT)
Dept: INTERNAL MEDICINE | Facility: CLINIC | Age: 73
End: 2023-06-20
Payer: MEDICARE

## 2023-06-27 DIAGNOSIS — T45.1X5A CHEMOTHERAPY INDUCED DIARRHEA: ICD-10-CM

## 2023-06-27 DIAGNOSIS — K52.1 CHEMOTHERAPY INDUCED DIARRHEA: ICD-10-CM

## 2023-06-27 RX ORDER — LOPERAMIDE HYDROCHLORIDE 2 MG/1
CAPSULE ORAL
Qty: 60 CAPSULE | Refills: 0 | Status: SHIPPED | OUTPATIENT
Start: 2023-06-27

## 2023-07-10 ENCOUNTER — PATIENT OUTREACH (OUTPATIENT)
Dept: ADMINISTRATIVE | Facility: HOSPITAL | Age: 73
End: 2023-07-10
Payer: MEDICARE

## 2023-07-10 NOTE — PROGRESS NOTES
Health Maintenance Due   Topic Date Due    Diabetes Urine Screening  Never done    COVID-19 Vaccine (4 - Moderna series) 05/17/2023    DEXA Scan  07/06/2023     Triggered LINKS and reconciled immunizations. Updated Care Everywhere. Chart reviewed as part of [employer] report- pt has previously documented statin intolerance; edited upcoming PCP office visit appt note to re-document statin intolerance.

## 2023-07-11 DIAGNOSIS — D75.9 DRUG-INDUCED CYTOPENIA: ICD-10-CM

## 2023-07-11 DIAGNOSIS — C90.00 MULTIPLE MYELOMA, REMISSION STATUS UNSPECIFIED: ICD-10-CM

## 2023-07-11 DIAGNOSIS — T50.905A DRUG-INDUCED CYTOPENIA: ICD-10-CM

## 2023-07-11 NOTE — TELEPHONE ENCOUNTER
"----- Message from Nancy Haynes sent at 7/11/2023 10:08 AM CDT -----  Regarding: Refill  Contact: [facility]     [facility] requesting a refill for the following meds pomalidomide (POMALYST) 2 mg Cap... Please call and adv @            Confirmed contact below:   Contact Name: [facility]  Phone Number: [phone]               Additional Notes:  "Thank you for all that you do for our patients"                                           "

## 2023-07-13 ENCOUNTER — TELEPHONE (OUTPATIENT)
Dept: HEMATOLOGY/ONCOLOGY | Facility: CLINIC | Age: 73
End: 2023-07-13
Payer: MEDICARE

## 2023-07-13 NOTE — TELEPHONE ENCOUNTER
"----- Message from Seamuscharline Bachon sent at 7/13/2023  1:54 PM CDT -----  Consult/Advisory:          Name Of Caller:  [facility]        Contact Preference?:  [phone]      Fax [phone]      Provider Name: Noman      Does patient feel the need to be seen today? No      What is the nature of the call?: Calling to confirm if refill for pomalidomide (POMALYST) 2 mg Cap needs to be cancelled or not          Additional Notes:  "Thank you for all that you do for our patients"      "

## 2023-07-13 NOTE — TELEPHONE ENCOUNTER
Reach out to provider to verify he wants to cancels pts pomalyst. Pharmacy to call back tomorrow for an update on MD response/med

## 2023-07-14 ENCOUNTER — OFFICE VISIT (OUTPATIENT)
Dept: HEMATOLOGY/ONCOLOGY | Facility: CLINIC | Age: 73
End: 2023-07-14
Payer: MEDICARE

## 2023-07-14 ENCOUNTER — INFUSION (OUTPATIENT)
Dept: INFUSION THERAPY | Facility: HOSPITAL | Age: 73
End: 2023-07-14
Payer: MEDICARE

## 2023-07-14 VITALS
DIASTOLIC BLOOD PRESSURE: 62 MMHG | HEIGHT: 62 IN | RESPIRATION RATE: 18 BRPM | TEMPERATURE: 98 F | BODY MASS INDEX: 26.41 KG/M2 | WEIGHT: 143.5 LBS | SYSTOLIC BLOOD PRESSURE: 134 MMHG | HEART RATE: 65 BPM

## 2023-07-14 VITALS
OXYGEN SATURATION: 98 % | RESPIRATION RATE: 16 BRPM | HEIGHT: 62 IN | SYSTOLIC BLOOD PRESSURE: 119 MMHG | DIASTOLIC BLOOD PRESSURE: 72 MMHG | HEART RATE: 66 BPM | WEIGHT: 143.5 LBS | BODY MASS INDEX: 26.41 KG/M2

## 2023-07-14 DIAGNOSIS — Z79.899 IMMUNODEFICIENCY DUE TO DRUGS: ICD-10-CM

## 2023-07-14 DIAGNOSIS — F03.90 DEMENTIA, UNSPECIFIED DEMENTIA SEVERITY, UNSPECIFIED DEMENTIA TYPE, UNSPECIFIED WHETHER BEHAVIORAL, PSYCHOTIC, OR MOOD DISTURBANCE OR ANXIETY: ICD-10-CM

## 2023-07-14 DIAGNOSIS — Z94.84 HISTORY OF AUTO STEM CELL TRANSPLANT: Primary | ICD-10-CM

## 2023-07-14 DIAGNOSIS — C90.00 MULTIPLE MYELOMA, REMISSION STATUS UNSPECIFIED: ICD-10-CM

## 2023-07-14 DIAGNOSIS — D50.9 IRON DEFICIENCY ANEMIA, UNSPECIFIED IRON DEFICIENCY ANEMIA TYPE: ICD-10-CM

## 2023-07-14 DIAGNOSIS — C90.02 MULTIPLE MYELOMA IN RELAPSE: ICD-10-CM

## 2023-07-14 DIAGNOSIS — D84.821 IMMUNODEFICIENCY DUE TO DRUGS: ICD-10-CM

## 2023-07-14 DIAGNOSIS — C90.01 MULTIPLE MYELOMA IN REMISSION: Primary | ICD-10-CM

## 2023-07-14 PROCEDURE — 3008F PR BODY MASS INDEX (BMI) DOCUMENTED: ICD-10-PCS | Mod: HCNC,CPTII,S$GLB, | Performed by: INTERNAL MEDICINE

## 2023-07-14 PROCEDURE — 63600175 PHARM REV CODE 636 W HCPCS: Mod: JZ,TB,HCNC | Performed by: INTERNAL MEDICINE

## 2023-07-14 PROCEDURE — 3074F PR MOST RECENT SYSTOLIC BLOOD PRESSURE < 130 MM HG: ICD-10-PCS | Mod: HCNC,CPTII,S$GLB, | Performed by: INTERNAL MEDICINE

## 2023-07-14 PROCEDURE — 1101F PT FALLS ASSESS-DOCD LE1/YR: CPT | Mod: HCNC,CPTII,S$GLB, | Performed by: INTERNAL MEDICINE

## 2023-07-14 PROCEDURE — 99999 PR PBB SHADOW E&M-EST. PATIENT-LVL III: CPT | Mod: PBBFAC,HCNC,, | Performed by: INTERNAL MEDICINE

## 2023-07-14 PROCEDURE — 99215 PR OFFICE/OUTPT VISIT, EST, LEVL V, 40-54 MIN: ICD-10-PCS | Mod: HCNC,S$GLB,, | Performed by: INTERNAL MEDICINE

## 2023-07-14 PROCEDURE — 99215 OFFICE O/P EST HI 40 MIN: CPT | Mod: HCNC,S$GLB,, | Performed by: INTERNAL MEDICINE

## 2023-07-14 PROCEDURE — 3044F PR MOST RECENT HEMOGLOBIN A1C LEVEL <7.0%: ICD-10-PCS | Mod: HCNC,CPTII,S$GLB, | Performed by: INTERNAL MEDICINE

## 2023-07-14 PROCEDURE — 1101F PR PT FALLS ASSESS DOC 0-1 FALLS W/OUT INJ PAST YR: ICD-10-PCS | Mod: HCNC,CPTII,S$GLB, | Performed by: INTERNAL MEDICINE

## 2023-07-14 PROCEDURE — 3288F FALL RISK ASSESSMENT DOCD: CPT | Mod: HCNC,CPTII,S$GLB, | Performed by: INTERNAL MEDICINE

## 2023-07-14 PROCEDURE — 3078F DIAST BP <80 MM HG: CPT | Mod: HCNC,CPTII,S$GLB, | Performed by: INTERNAL MEDICINE

## 2023-07-14 PROCEDURE — 4010F ACE/ARB THERAPY RXD/TAKEN: CPT | Mod: HCNC,CPTII,S$GLB, | Performed by: INTERNAL MEDICINE

## 2023-07-14 PROCEDURE — 3078F PR MOST RECENT DIASTOLIC BLOOD PRESSURE < 80 MM HG: ICD-10-PCS | Mod: HCNC,CPTII,S$GLB, | Performed by: INTERNAL MEDICINE

## 2023-07-14 PROCEDURE — 4010F PR ACE/ARB THEARPY RXD/TAKEN: ICD-10-PCS | Mod: HCNC,CPTII,S$GLB, | Performed by: INTERNAL MEDICINE

## 2023-07-14 PROCEDURE — 3288F PR FALLS RISK ASSESSMENT DOCUMENTED: ICD-10-PCS | Mod: HCNC,CPTII,S$GLB, | Performed by: INTERNAL MEDICINE

## 2023-07-14 PROCEDURE — 1159F MED LIST DOCD IN RCRD: CPT | Mod: HCNC,CPTII,S$GLB, | Performed by: INTERNAL MEDICINE

## 2023-07-14 PROCEDURE — 99999 PR PBB SHADOW E&M-EST. PATIENT-LVL III: ICD-10-PCS | Mod: PBBFAC,HCNC,, | Performed by: INTERNAL MEDICINE

## 2023-07-14 PROCEDURE — 1126F PR PAIN SEVERITY QUANTIFIED, NO PAIN PRESENT: ICD-10-PCS | Mod: HCNC,CPTII,S$GLB, | Performed by: INTERNAL MEDICINE

## 2023-07-14 PROCEDURE — 96401 CHEMO ANTI-NEOPL SQ/IM: CPT | Mod: HCNC

## 2023-07-14 PROCEDURE — 3074F SYST BP LT 130 MM HG: CPT | Mod: HCNC,CPTII,S$GLB, | Performed by: INTERNAL MEDICINE

## 2023-07-14 PROCEDURE — 1126F AMNT PAIN NOTED NONE PRSNT: CPT | Mod: HCNC,CPTII,S$GLB, | Performed by: INTERNAL MEDICINE

## 2023-07-14 PROCEDURE — 3008F BODY MASS INDEX DOCD: CPT | Mod: HCNC,CPTII,S$GLB, | Performed by: INTERNAL MEDICINE

## 2023-07-14 PROCEDURE — 3044F HG A1C LEVEL LT 7.0%: CPT | Mod: HCNC,CPTII,S$GLB, | Performed by: INTERNAL MEDICINE

## 2023-07-14 PROCEDURE — 1159F PR MEDICATION LIST DOCUMENTED IN MEDICAL RECORD: ICD-10-PCS | Mod: HCNC,CPTII,S$GLB, | Performed by: INTERNAL MEDICINE

## 2023-07-14 RX ORDER — DIPHENHYDRAMINE HYDROCHLORIDE 50 MG/ML
50 INJECTION INTRAMUSCULAR; INTRAVENOUS ONCE AS NEEDED
Status: DISCONTINUED | OUTPATIENT
Start: 2023-07-14 | End: 2023-07-14 | Stop reason: HOSPADM

## 2023-07-14 RX ORDER — EPINEPHRINE 0.3 MG/.3ML
0.3 INJECTION SUBCUTANEOUS ONCE AS NEEDED
Status: CANCELLED | OUTPATIENT
Start: 2023-09-27

## 2023-07-14 RX ORDER — EPINEPHRINE 0.3 MG/.3ML
0.3 INJECTION SUBCUTANEOUS ONCE AS NEEDED
Status: DISCONTINUED | OUTPATIENT
Start: 2023-07-14 | End: 2023-07-14 | Stop reason: HOSPADM

## 2023-07-14 RX ORDER — DIPHENHYDRAMINE HYDROCHLORIDE 50 MG/ML
50 INJECTION INTRAMUSCULAR; INTRAVENOUS ONCE AS NEEDED
Status: CANCELLED | OUTPATIENT
Start: 2023-09-27

## 2023-07-14 RX ADMIN — DARATUMUMAB AND HYALURONIDASE-FIHJ (HUMAN RECOMBINANT) 1800 MG: 1800; 30000 INJECTION SUBCUTANEOUS at 11:07

## 2023-07-14 NOTE — PROGRESS NOTES
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  She achieved and tolerated well VRd x completing 7, 28 day cycles and achieving deep VGPR to induction. Then underwent Melphalan autologous stem cell transplant on 2/12/2020.      Extensive PMH as below.    2 prior CVAs (one in 2008, one in 2011)  L breast cancer DCIS stage 0 (s/p lumpectomy and radiation, no endocrine tx due to CVA)  HTN  DM II  Neuropathy, b/l hands  Prior smoker, quit in 2011  Hepatic lesions - vascular per recent MRI in 09 /2019 with no changes; will confirm no further rascon needed from help  Statin Intolerance - on praluent, PCSK9 inhibitor  HFpEF - EF 55%, diastolic dysfunction  Anxiety/Depression     ADMISSION SUMMARY: 2/10-2/26/2020  Admitted 2/10, tolerated chemo and transplant well. Engrafted on day +12. Remained afebrile throughout hospital stay and no mucositis. Experienced expected side effects of nausea and diarrhea controlled with antiemetics and Imodium. Discharged home with home PT/OT       Subjective:       Initial History: Ms. Thompson is a 73 y.o. female who presents for virtual follow up.  She is 3 yrs 5   months post AutoSCT. Relapsed  at approximately  1 year post sct and was on Sravanthi/zhanna/dex until summer 2022 when changed to sravanthi/pom/dex due to biochemical only progression. Dex stopped due to mood issues.  Remains on sravanthi/pom/dex salvage. Tolerating dose adjusted pomalyst.  Per friend accompanying  Her dementia has slightly worsened and now accompanied by hallucinations.  They have established neurologist.     Tolerating iron sucrose for her JESSE; injectafer and  feraheme were refused by her insurance.   Past Medical History:   Past Medical History:   Diagnosis Date    Acute respiratory failure with hypoxia 4/30/2019    FUENTES (acute kidney injury) 5/1/2019    Allergy     Anemia     Aortic aneurysm     Breast cancer 10/2011    left breast Stage 0 DCIS    Cataract     Chronic diastolic congestive heart failure 11/6/2015    Colon polyp     Community acquired pneumonia of right lower lobe of lung 8/3/2021    GERD (gastroesophageal reflux disease)     Hiatal hernia     History of colonic polyps     Hx of psychiatric care     Zoloft    HX: breast cancer     Hyperlipemia     Hypertension     ICH (intracerebral hemorrhage)     Immunocompromised 10/28/2022    Major depressive disorder, single episode, mild 6/23/2016    Nuclear sclerosis 7/21/2014    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 7/21/2014    PAD (peripheral artery disease) 11/6/2015    Psychiatric problem     Statin-induced rhabdomyolysis     4/2015     Stroke 4/2011    Type 2 diabetes mellitus with diabetic peripheral angiopathy without gangrene, with long-term current use of insulin 3/31/2021    Type 2 diabetes mellitus without complication, without long-term current use of insulin 2/10/2020    Walker as ambulation aid        Past Surgical HIstory:   Past Surgical History:   Procedure Laterality Date    APPENDECTOMY      BONE MARROW BIOPSY Left 10/3/2019    Procedure: Biopsy-bone marrow;  Surgeon: Jere Tineo MD;  Location: [facility] OR [address] [place] [address];  Service: Oncology;  Laterality: Left;    BREAST BIOPSY Left 10/2011    BREAST LUMPECTOMY Left 2011    DCIS    COLONOSCOPY      COLONOSCOPY N/A 1/9/2020    Procedure: COLONOSCOPY;  Surgeon: Quang De La Cruz MD;  Location: [facility] (4TH FLR);  Service: Endoscopy;  Laterality: N/A;    CYST REMOVAL      back    ESOPHAGOGASTRODUODENOSCOPY  07/2016    duodenal angioectasia    ESOPHAGOGASTRODUODENOSCOPY N/A 1/9/2020    Procedure: EGD  (ESOPHAGOGASTRODUODENOSCOPY);  Surgeon: Quang De La Cruz MD;  Location: [facility] ([address] [place] [address]);  Service: Endoscopy;  Laterality: N/A;    FOOT FRACTURE SURGERY  10/2012    right foot, with R hallux valgus repair    FRACTURE SURGERY Right     broken toe repiar    HEMORRHOID SURGERY      LIVER BIOPSY  04/2015    essentially normal, no fibrosis    TUBAL LIGATION      UPPER GASTROINTESTINAL ENDOSCOPY         Family History:   Family History   Problem Relation Age of Onset    Dementia Sister         x1 sister    Cancer Sister 63        Lung Cancer    No Known Problems Mother     Cancer Father     No Known Problems Brother     Hypertension Daughter     Fibroids Daughter         uterine    Hypertension Son     No Known Problems Brother     No Known Problems Maternal Aunt     No Known Problems Maternal Uncle     No Known Problems Paternal Aunt     No Known Problems Paternal Uncle     Hypertension Maternal Grandmother     No Known Problems Maternal Grandfather     No Known Problems Paternal Grandmother     No Known Problems Paternal Grandfather     Ovarian cancer Neg Hx     Colon cancer Neg Hx     Tremor Neg Hx     Amblyopia Neg Hx     Blindness Neg Hx     Cataracts Neg Hx     Diabetes Neg Hx     Glaucoma Neg Hx     Macular degeneration Neg Hx     Retinal detachment Neg Hx     Strabismus Neg Hx     Stroke Neg Hx     Thyroid disease Neg Hx     Esophageal cancer Neg Hx     Rectal cancer Neg Hx     Stomach cancer Neg Hx     Ulcerative colitis Neg Hx     Crohn's disease Neg Hx     Irritable bowel syndrome Neg Hx     Celiac disease Neg Hx        Social History:  reports that she quit smoking about 12 years ago. Her smoking use included cigarettes. She started smoking about 56 years ago. She has a 11.00 pack-year smoking history. She has never used smokeless tobacco. She reports that she does not currently use alcohol. She reports that she does not use drugs.    Allergies:  Review of patient's allergies indicates:   Allergen  Reactions    Lipitor [atorvastatin] Itching     Itching, elevated cpk, muscle aches, statin induced myositis       Medications:  Current Outpatient Medications   Medication Sig Dispense Refill    acyclovir (ZOVIRAX) 400 MG tablet TAKE 1 TABLET BY MOUTH TWICE A  tablet 3    albuterol (ACCUNEB) 1.25 mg/3 mL Nebu Take 3 mLs (1.25 mg total) by nebulization every 6 (six) hours as needed (wheeze/cough). Rescue 75 mL 2    amLODIPine (NORVASC) 5 MG tablet TAKE 1 TABLET BY MOUTH EVERY DAY 90 tablet 2    aspirin (ECOTRIN) 81 MG EC tablet TAKE 1 TABLET BY MOUTH EVERY DAY 90 tablet 3    cholecalciferol, vitamin D3, 125 mcg (5,000 unit) Tab 1 tablet DAILY (route: oral)      cyanocobalamin, vitamin B-12, 5,000 mcg Subl Place one under tongue once a day for 1 month, then continue to take under tongue per week 30 tablet 3    DARZALEX FASPRO 1,800 mg-30,000 unit/15 mL Soln       ENGERIX-B, PF, 20 mcg/mL Syrg       evolocumab (REPATHA SURECLICK) 140 mg/mL PnIj Inject 1 mL (140 mg total) into the skin every 14 (fourteen) days. 6 mL 3    ferrous gluconate 324 mg (37.5 mg iron) Tab tablet 1 tablet DAILY (route: oral)      gabapentin (NEURONTIN) 300 MG capsule Take 1 capsule (300 mg total) by mouth 2 (two) times daily. 180 capsule 2    GADAVIST 1 mmol/mL (604.72 mg/mL) Soln injection       loperamide (IMODIUM) 2 mg capsule TAKE 1 CAPSULE BY MOUTH 4 TIMES DAILY AS NEEDED FOR DIARRHEA. 60 capsule 0    metFORMIN (GLUCOPHAGE) 1000 MG tablet Take 1 tablet (1,000 mg total) by mouth 2 (two) times daily with meals. 180 tablet 3    omeprazole (PRILOSEC) 40 MG capsule TAKE 1 CAPSULE BY MOUTH EVERY DAY 90 capsule 3    OXYGEN-AIR DELIVERY SYSTEMS MISC 1-2 Liter O2 - CONTINUOUS (route: Oxygen)      pomalidomide (POMALYST) 2 mg Cap Take 2 mg by mouth Daily. 21 capsule 0    potassium chloride SA (K-DUR,KLOR-CON) 20 MEQ tablet       valsartan (DIOVAN) 160 MG tablet Take 1 tablet (160 mg total) by mouth once daily. 90 tablet 3    zoledronic 4  mg/100 mL PgBk infusion       zoledronic acid (ZOMETA) 4 mg/5 mL injection        No current facility-administered medications for this visit.      See HPI     ECOG Performance Status: 2 (due to remote  CVA)   Objective:      Vitals:   Vitals:    07/14/23 0902   BP: 119/72   Pulse: 66   Resp: 16     General - somewhat frail appearing, no apparent distress  HEENT - oropharynx clear  Chest and Lung - clear to auscultation bilaterally   Cardiovascular - RRR with no MGR, normal S1 and S2  Abdomen-  soft, nontender, no palpable hepatomegaly or splenomegaly  Lymph - no palpable lymphadenopathy  Heme - no bruising, petechiae, pallor  Skin - no rashes or lesions  Psych - appropriate mood and affect       Laboratory Data:  Lab Results   Component Value Date    WBC 2.84 (L) 07/14/2023    HGB 10.0 (L) 07/14/2023    HCT 33.6 (L) 07/14/2023    MCV 79 (L) 07/14/2023     07/14/2023       Gran # (ANC)   Date Value Ref Range Status   07/14/2023 1.2 (L) 1.8 - 7.7 K/uL Final     Gran %   Date Value Ref Range Status   07/14/2023 41.8 38.0 - 73.0 % Final     CMP  Sodium   Date Value Ref Range Status   07/14/2023 141 136 - 145 mmol/L Final     Potassium   Date Value Ref Range Status   07/14/2023 3.3 (L) 3.5 - 5.1 mmol/L Final     Chloride   Date Value Ref Range Status   07/14/2023 103 95 - 110 mmol/L Final     CO2   Date Value Ref Range Status   07/14/2023 29 23 - 29 mmol/L Final     Glucose   Date Value Ref Range Status   07/14/2023 93 70 - 110 mg/dL Final     BUN   Date Value Ref Range Status   07/14/2023 12 8 - 23 mg/dL Final     Creatinine   Date Value Ref Range Status   07/14/2023 1.0 0.5 - 1.4 mg/dL Final     Calcium   Date Value Ref Range Status   07/14/2023 8.6 (L) 8.7 - 10.5 mg/dL Final     Total Protein   Date Value Ref Range Status   07/14/2023 7.1 6.0 - 8.4 g/dL Final     Albumin   Date Value Ref Range Status   07/14/2023 3.9 3.5 - 5.2 g/dL Final     Total Bilirubin   Date Value Ref Range Status   07/14/2023 0.5 0.1 -  1.0 mg/dL Final     Comment:     For infants and newborns, interpretation of results should be based  on gestational age, weight and in agreement with clinical  observations.    Premature Infant recommended reference ranges:  Up to 24 hours.............<8.0 mg/dL  Up to 48 hours............<12.0 mg/dL  3-5 days..................<15.0 mg/dL  6-29 days.................<15.0 mg/dL       Alkaline Phosphatase   Date Value Ref Range Status   07/14/2023 47 (L) 55 - 135 U/L Final     AST   Date Value Ref Range Status   07/14/2023 12 10 - 40 U/L Final     ALT   Date Value Ref Range Status   07/14/2023 9 (L) 10 - 44 U/L Final     Anion Gap   Date Value Ref Range Status   07/14/2023 9 8 - 16 mmol/L Final     eGFR   Date Value Ref Range Status   07/14/2023 59.1 (A) >60 mL/min/1.73 m^2 Final     Kappa Free Light Chains   Date Value Ref Range Status   06/16/2023 10.93 (H) 0.33 - 1.94 mg/dL Final   05/19/2023 6.75 (H) 0.33 - 1.94 mg/dL Final   04/21/2023 8.69 (H) 0.33 - 1.94 mg/dL Final     Lambda Free Light Chains   Date Value Ref Range Status   06/16/2023 1.44 0.57 - 2.63 mg/dL Final   05/19/2023 2.41 0.57 - 2.63 mg/dL Final   04/21/2023 2.30 0.57 - 2.63 mg/dL Final     Kappa/Lambda FLC Ratio   Date Value Ref Range Status   06/16/2023 7.59 (H) 0.26 - 1.65 Final     Comment:     Undetected antigen excess is a rare event but cannot   be excluded. If these free light chain results do not   agree with other clinical or laboratory findings or   if the sample is from a patient that has previously   demonstrated antigen excess, discuss with the testing   laboratory.   Results should always be interpreted in conjunction   with other laboratory tests and clinical evidence.     05/19/2023 2.80 (H) 0.26 - 1.65 Final     Comment:     Undetected antigen excess is a rare event but cannot   be excluded. If these free light chain results do not   agree with other clinical or laboratory findings or   if the sample is from a patient that has  previously   demonstrated antigen excess, discuss with the testing   laboratory.   Results should always be interpreted in conjunction   with other laboratory tests and clinical evidence.     04/21/2023 3.78 (H) 0.26 - 1.65 Final     Comment:     Undetected antigen excess is a rare event but cannot   be excluded. If these free light chain results do not   agree with other clinical or laboratory findings or   if the sample is from a patient that has previously   demonstrated antigen excess, discuss with the testing   laboratory.   Results should always be interpreted in conjunction   with other laboratory tests and clinical evidence.       IgG   Date Value Ref Range Status   07/14/2023 1081 650 - 1600 mg/dL Final     Comment:     IgG Cord Blood Reference Range: 650-1600 mg/dL.     IgA   Date Value Ref Range Status   07/14/2023 137 40 - 350 mg/dL Final     Comment:     IgA Cord Blood Reference Range: <5 mg/dL.     IgM   Date Value Ref Range Status   06/16/2023 13 (L) 50 - 300 mg/dL Final     Comment:     IgM Cord Blood Reference Range: <25 mg/dL.     Pathologist Interpretation SPE   Date Value Ref Range Status   06/16/2023 REVIEWED  Final     Comment:       Electronically reviewed and signed by:  Steve Medrano MD  Signed on 06/19/23 at 15:58  Normal total protein.   Normal gamma globulins are decreased.   There is a paraprotein band in gamma = 0.40 g/dL, previously 0.31   g/dL.      05/19/2023 REVIEWED  Final     Comment:       Electronically reviewed and signed by:  Shelli Haynes MD  Signed on 05/23/23 at 07:47  Normal total protein. Normal gamma globulins are decreased. There is   a paraprotein band in gamma = 0.31 g/dL, previously 0.24 g/dL.      04/21/2023 REVIEWED  Final     Comment:       Electronically reviewed and signed by:  Sreekanth Syed M.D.  Signed on 04/24/23 at 16:05  Normal total protein. Normal gamma globulins are decreased. There is   a paraprotein band in gamma = 0.24 g/dL, previously 0.29  "g/dL.        Pathologist Interpretation NATALY   Date Value Ref Range Status   06/16/2023 REVIEWED  Final     Comment:       Electronically reviewed and signed by:  tSeve Medrano MD  Signed on 06/19/23 at 15:58  IgG kappa specific monoclonal band present.            Imaging:      Assessment:       Ms. Thompson is a  73 y.o. femal with relapsed multiple myeloma.     Plan:     MM s/p Auto SCT  - high risk MM with 17p deletion  - 2  year 35 months  s/p Auto SCT  - started maintenance q2w velcade 5/6/20   - serial biochemical relapse  Noted and  given this and high risk CG , transitioned to Sravanthi/Noble (1mg/m2) /dex  Salvage July 2021    - Due to uncontrolled hyperglycemia dex stopped after C4.    -supportive meds:   continue ppx acyclovir, asa; resume maintenance zometa q 3 months    -she  demonstrated mild biochemical progression over summer 2022 studies with no CRAB, in light of this and high risk CG   we transitioned her therapy from Sravanthi/Velcade to Sravanthi/pomalyst; intolerant to dex.   - biochemical studies from 7/14/23 cw with continued CT so plan to continue sravanthi/pom until intolerance or progression     -Pomalyst dose decreased to  to 2mg (5/19/23) due to persistent neutropenia  now improved   -recommended she take prn imodium for occasional loose stool with pomalyst   -already on asa 81mg   -post transplant vaccines completed     Anemia due to chemotherapy/JESSE  Due to therapy and JESSE  Continue venofer q month when comes for her sravanthi     Neuropathy  -Stable   - continues gabapentin and prn tramadol     SOB/descending aortic aneurysm  - CTA and dopplers ordered urgently with high concern for DVT/PE; completed 8/3/20  - incidental descending thoracic aortic aneurysm noted; referred to thoracic surgery; seen 8/7/20; per note: "at current size would not recommend any intervention as risk of rupture remains low.  Recommend surveillance CT chest every 12 months to monitor growth of the aneurysm.  Would typically recommend " aspirin 81 mg daily in patients with aortic aneurysm  - ASA started (9/14/20)     HTN  - continue norvasc  - Patient to monitor BP closely    Dementia/anxiety  -worsened with hallucinatoins; will notify neurologist   - contniue fu with neuro/psych;  defer medication mgmt to them           -mgmt of chronic CV/dm2 per pcp    FU:     -cbc, cmp, serum free light chains, quantitative immunoglobulins, serum electropheresis, serum immunofixation and kenia injection and venofer infusion  in 4 weeks   -same labs, NP appt, kenia injection/zometa infusion/venofer infusion  in 8 weeks

## 2023-07-14 NOTE — Clinical Note
-cbc, cmp, serum free light chains, quantitative immunoglobulins, serum electropheresis, serum immunofixation and kenia injection and venofer infusion  in 4 weeks  -same labs, NP appt, kenia injection/zometa infusion/venofer infusion  in 8 weeks

## 2023-07-14 NOTE — PLAN OF CARE
1122-Pt tolerated Sravanthi SQ well today, no complaints or complications. VSS.  Pt aware to call provider with any questions or concerns and is aware of upcoming appts. Pt ambulatory from clinic with steady gait, no distress noted.

## 2023-07-14 NOTE — PLAN OF CARE
0959-Labs, hx, and medications reviewed, pt meets parameters for treatment today. Assessment completed and plan of care reviewed. Pt verbalized understanding.Pt voices no new complaints or concerns, will continue to monitor for safety.

## 2023-07-24 ENCOUNTER — TELEPHONE (OUTPATIENT)
Dept: HEMATOLOGY/ONCOLOGY | Facility: CLINIC | Age: 73
End: 2023-07-24
Payer: MEDICARE

## 2023-07-24 NOTE — TELEPHONE ENCOUNTER
"----- Message from Nancy Dallas sent at 7/24/2023 11:32 AM CDT -----  Regarding: Pt advice  Contact: [facility]      [facility] requesting call back in regards to the following meds pomalidomide (POMALYST) 2 mg Cap stating they received a cancellation for meds, and would like to know if it was a mistake or not.     Confirmed contact below:   Contact Name: [facility]  Phone Number: [phone]               Additional Notes:  "Thank you for all that you do for our patients"                                           "

## 2023-07-25 DIAGNOSIS — C90.00 MULTIPLE MYELOMA, REMISSION STATUS UNSPECIFIED: ICD-10-CM

## 2023-07-25 DIAGNOSIS — D75.9 DRUG-INDUCED CYTOPENIA: ICD-10-CM

## 2023-07-25 DIAGNOSIS — T50.905A DRUG-INDUCED CYTOPENIA: ICD-10-CM

## 2023-07-27 ENCOUNTER — SPECIALTY PHARMACY (OUTPATIENT)
Dept: PHARMACY | Facility: CLINIC | Age: 73
End: 2023-07-27
Payer: MEDICARE

## 2023-07-27 NOTE — TELEPHONE ENCOUNTER
Specialty Pharmacy - Refill Coordination    Specialty Medication Orders Linked to Encounter      Flowsheet Row Most Recent Value   Medication #1 evolocumab (REPATHA SURECLICK) 140 mg/mL PnIj (Order#[id number], Rx#[id number])            Refill Questions - Documented Responses      Flowsheet Row Most Recent Value   Patient Availability and HIPAA Verification    Does patient want to proceed with activity? Yes   HIPAA/medical authority confirmed? Yes   Relationship to patient of person spoken to? Spouse/Significant Other   Refill Screening Questions    Would patient like to speak to a pharmacist? No   When does the patient need to receive the medication? 08/07/23   Refill Delivery Questions    How will the patient receive the medication? [facility]   When does the patient need to receive the medication? 08/07/23   Shipping Address Home   Address in [facility] confirmed and updated if neccessary? Yes   Expected Copay ($) 0   Is the patient able to afford the medication copay? Yes   Payment Method zero copay   Days supply of Refill 28   Supplies needed? No supplies needed   Refill activity completed? Yes   Refill activity plan Refill scheduled   Shipment/Pickup Date: 08/03/23            Current Outpatient Medications   Medication Sig    acyclovir (ZOVIRAX) 400 MG tablet TAKE 1 TABLET BY MOUTH TWICE A DAY    albuterol (ACCUNEB) 1.25 mg/3 mL Nebu Take 3 mLs (1.25 mg total) by nebulization every 6 (six) hours as needed (wheeze/cough). Rescue    amLODIPine (NORVASC) 5 MG tablet TAKE 1 TABLET BY MOUTH EVERY DAY    aspirin (ECOTRIN) 81 MG EC tablet TAKE 1 TABLET BY MOUTH EVERY DAY    cholecalciferol, vitamin D3, 125 mcg (5,000 unit) Tab 1 tablet DAILY (route: oral)    cyanocobalamin, vitamin B-12, 5,000 mcg Subl Place one under tongue once a day for 1 month, then continue to take under tongue per week    DARZALEX FASPRO 1,800 mg-30,000 unit/15 mL Soln     ENGERIX-B, PF, 20 mcg/mL Syrg     evolocumab (REPATHA SURECLICK) 140  mg/mL PnIj Inject 1 mL (140 mg total) into the skin every 14 (fourteen) days.    ferrous gluconate 324 mg (37.5 mg iron) Tab tablet 1 tablet DAILY (route: oral)    gabapentin (NEURONTIN) 300 MG capsule Take 1 capsule (300 mg total) by mouth 2 (two) times daily.    GADAVIST 1 mmol/mL (604.72 mg/mL) Soln injection     loperamide (IMODIUM) 2 mg capsule TAKE 1 CAPSULE BY MOUTH 4 TIMES DAILY AS NEEDED FOR DIARRHEA.    metFORMIN (GLUCOPHAGE) 1000 MG tablet Take 1 tablet (1,000 mg total) by mouth 2 (two) times daily with meals.    omeprazole (PRILOSEC) 40 MG capsule TAKE 1 CAPSULE BY MOUTH EVERY DAY    OXYGEN-AIR DELIVERY SYSTEMS MISC 1-2 Liter O2 - CONTINUOUS (route: Oxygen)    pomalidomide (POMALYST) 2 mg Cap Take 2 mg by mouth Daily.    potassium chloride SA (K-DUR,KLOR-CON) 20 MEQ tablet     valsartan (DIOVAN) 160 MG tablet Take 1 tablet (160 mg total) by mouth once daily.    zoledronic 4 mg/100 mL PgBk infusion     zoledronic acid (ZOMETA) 4 mg/5 mL injection    Last reviewed on 7/14/2023  9:04 AM by Felicity Contreras MA    Review of patient's allergies indicates:   Allergen Reactions    Lipitor [atorvastatin] Itching     Itching, elevated cpk, muscle aches, statin induced myositis    Last reviewed on  7/25/2023 1:06 PM by Torrie Whitt      Tasks added this encounter   No tasks added.   Tasks due within next 3 months   No tasks due.     Ashleigh Underwood, Patient Care Assistant  [facility]  [address] [place] [id number]  Phone: [phone]  Fax: [phone]

## 2023-07-28 DIAGNOSIS — C90.00 MULTIPLE MYELOMA, REMISSION STATUS UNSPECIFIED: Primary | ICD-10-CM

## 2023-08-10 RX ORDER — DIPHENHYDRAMINE HYDROCHLORIDE 50 MG/ML
50 INJECTION INTRAMUSCULAR; INTRAVENOUS ONCE AS NEEDED
Status: CANCELLED | OUTPATIENT
Start: 2023-08-11 | End: 2035-01-06

## 2023-08-10 RX ORDER — EPINEPHRINE 0.3 MG/.3ML
0.3 INJECTION SUBCUTANEOUS ONCE AS NEEDED
Status: CANCELLED | OUTPATIENT
Start: 2023-08-11 | End: 2035-01-06

## 2023-08-11 ENCOUNTER — INFUSION (OUTPATIENT)
Dept: INFUSION THERAPY | Facility: HOSPITAL | Age: 73
End: 2023-08-11
Payer: MEDICARE

## 2023-08-11 VITALS
SYSTOLIC BLOOD PRESSURE: 122 MMHG | WEIGHT: 143.5 LBS | TEMPERATURE: 98 F | HEIGHT: 62 IN | DIASTOLIC BLOOD PRESSURE: 73 MMHG | HEART RATE: 66 BPM | BODY MASS INDEX: 26.41 KG/M2 | RESPIRATION RATE: 18 BRPM

## 2023-08-11 DIAGNOSIS — C90.01 MULTIPLE MYELOMA IN REMISSION: Primary | ICD-10-CM

## 2023-08-11 DIAGNOSIS — D50.0 IRON DEFICIENCY ANEMIA DUE TO CHRONIC BLOOD LOSS: ICD-10-CM

## 2023-08-11 DIAGNOSIS — C90.02 MULTIPLE MYELOMA IN RELAPSE: ICD-10-CM

## 2023-08-11 DIAGNOSIS — E83.42 HYPOMAGNESEMIA: Primary | ICD-10-CM

## 2023-08-11 PROCEDURE — 96401 CHEMO ANTI-NEOPL SQ/IM: CPT | Mod: HCNC

## 2023-08-11 PROCEDURE — 96374 THER/PROPH/DIAG INJ IV PUSH: CPT

## 2023-08-11 PROCEDURE — 96375 TX/PRO/DX INJ NEW DRUG ADDON: CPT | Mod: HCNC

## 2023-08-11 PROCEDURE — 63600175 PHARM REV CODE 636 W HCPCS: Mod: JZ,TB,HCNC | Performed by: INTERNAL MEDICINE

## 2023-08-11 PROCEDURE — 63600175 PHARM REV CODE 636 W HCPCS: Mod: HCNC | Performed by: NURSE PRACTITIONER

## 2023-08-11 RX ORDER — SODIUM CHLORIDE 0.9 % (FLUSH) 0.9 %
10 SYRINGE (ML) INJECTION
Status: CANCELLED | OUTPATIENT
Start: 2023-08-18

## 2023-08-11 RX ORDER — SODIUM CHLORIDE 0.9 % (FLUSH) 0.9 %
10 SYRINGE (ML) INJECTION
Status: DISCONTINUED | OUTPATIENT
Start: 2023-08-11 | End: 2023-08-11 | Stop reason: HOSPADM

## 2023-08-11 RX ORDER — HEPARIN 100 UNIT/ML
500 SYRINGE INTRAVENOUS
Status: DISCONTINUED | OUTPATIENT
Start: 2023-08-11 | End: 2023-08-11 | Stop reason: HOSPADM

## 2023-08-11 RX ORDER — EPINEPHRINE 0.3 MG/.3ML
0.3 INJECTION SUBCUTANEOUS ONCE AS NEEDED
Status: DISCONTINUED | OUTPATIENT
Start: 2023-08-11 | End: 2023-08-11 | Stop reason: HOSPADM

## 2023-08-11 RX ORDER — LANOLIN ALCOHOL/MO/W.PET/CERES
400 CREAM (GRAM) TOPICAL 2 TIMES DAILY
Qty: 20 TABLET | Refills: 0 | Status: SHIPPED | OUTPATIENT
Start: 2023-08-11 | End: 2023-08-21

## 2023-08-11 RX ORDER — DIPHENHYDRAMINE HYDROCHLORIDE 50 MG/ML
50 INJECTION INTRAMUSCULAR; INTRAVENOUS ONCE AS NEEDED
Status: DISCONTINUED | OUTPATIENT
Start: 2023-08-11 | End: 2023-08-11 | Stop reason: HOSPADM

## 2023-08-11 RX ORDER — HEPARIN 100 UNIT/ML
500 SYRINGE INTRAVENOUS
Status: CANCELLED | OUTPATIENT
Start: 2023-08-18

## 2023-08-11 RX ADMIN — IRON SUCROSE 200 MG: 20 INJECTION, SOLUTION INTRAVENOUS at 09:08

## 2023-08-11 RX ADMIN — DARATUMUMAB AND HYALURONIDASE-FIHJ (HUMAN RECOMBINANT) 1800 MG: 1800; 30000 INJECTION SUBCUTANEOUS at 09:08

## 2023-08-17 ENCOUNTER — TELEPHONE (OUTPATIENT)
Dept: NEUROLOGY | Facility: CLINIC | Age: 73
End: 2023-08-17
Payer: MEDICARE

## 2023-08-17 NOTE — TELEPHONE ENCOUNTER
----- Message from Emanuel Drummond MD sent at 7/17/2023  7:31 AM CDT -----  Good Morning,  Thank you for the update - our team will reach out to patient and schedule a follow up.  Emanuel Moreno    ----- Message -----  From: Jere Tineo MD  Sent: 7/15/2023   2:06 PM CDT  To: MD Dr. Hoda Sylvester,   This mutual pt I follow for myeloma under good control.  You see for dementia. She was with clinic this week with her friend and per  and friend pt is now having hallucinations and dementia worsening. Would you be able to get her back into your clinic soon? Thank you   Ed Tineo MD  Hematology & Stem Cell Transplant

## 2023-08-24 DIAGNOSIS — T50.905A DRUG-INDUCED CYTOPENIA: ICD-10-CM

## 2023-08-24 DIAGNOSIS — D75.9 DRUG-INDUCED CYTOPENIA: ICD-10-CM

## 2023-08-24 DIAGNOSIS — C90.00 MULTIPLE MYELOMA, REMISSION STATUS UNSPECIFIED: ICD-10-CM

## 2023-08-24 NOTE — TELEPHONE ENCOUNTER
"----- Message from Nancy Haynes sent at 8/24/2023 12:57 PM CDT -----  Regarding: Pt advice  Contact:[facility]    [facility] requesting a refill for the following meds pomalidomide (POMALYST) 2 mg Cap... Please call and adv @            Confirmed contact below:   Contact Name: [facility]  Phone Number: [phone]               Additional Notes:  "Thank you for all that you do for our patients"                                           "

## 2023-08-25 NOTE — TELEPHONE ENCOUNTER
Called pt as second attempt   Spoke w/ her  and scheduled appt  Pt's  verbalized understanding and had no further concerns or questions.

## 2023-09-12 ENCOUNTER — OFFICE VISIT (OUTPATIENT)
Dept: NEUROLOGY | Facility: CLINIC | Age: 73
End: 2023-09-12
Payer: MEDICARE

## 2023-09-12 ENCOUNTER — TELEPHONE (OUTPATIENT)
Dept: NEUROLOGY | Facility: CLINIC | Age: 73
End: 2023-09-12
Payer: MEDICARE

## 2023-09-12 DIAGNOSIS — F03.90 ADVANCING DEMENTIA: ICD-10-CM

## 2023-09-12 DIAGNOSIS — E53.8 B12 DEFICIENCY: ICD-10-CM

## 2023-09-12 DIAGNOSIS — F01.50 MIXED DEMENTIA: Primary | ICD-10-CM

## 2023-09-12 DIAGNOSIS — R44.3 HALLUCINATION: ICD-10-CM

## 2023-09-12 DIAGNOSIS — G30.9 MIXED DEMENTIA: Primary | ICD-10-CM

## 2023-09-12 DIAGNOSIS — G47.00 INSOMNIA, UNSPECIFIED TYPE: ICD-10-CM

## 2023-09-12 DIAGNOSIS — F02.80 MIXED DEMENTIA: Primary | ICD-10-CM

## 2023-09-12 DIAGNOSIS — F01.A18 MILD VASCULAR DEMENTIA WITH OTHER BEHAVIORAL DISTURBANCE: ICD-10-CM

## 2023-09-12 DIAGNOSIS — E51.9 THIAMINE DEFICIENCY: ICD-10-CM

## 2023-09-12 PROCEDURE — 3044F HG A1C LEVEL LT 7.0%: CPT | Mod: HCNC,CPTII,95, | Performed by: PSYCHIATRY & NEUROLOGY

## 2023-09-12 PROCEDURE — 99215 OFFICE O/P EST HI 40 MIN: CPT | Mod: 25,HCNC,95, | Performed by: PSYCHIATRY & NEUROLOGY

## 2023-09-12 PROCEDURE — 4010F ACE/ARB THERAPY RXD/TAKEN: CPT | Mod: HCNC,CPTII,95, | Performed by: PSYCHIATRY & NEUROLOGY

## 2023-09-12 PROCEDURE — 4010F PR ACE/ARB THEARPY RXD/TAKEN: ICD-10-PCS | Mod: HCNC,CPTII,95, | Performed by: PSYCHIATRY & NEUROLOGY

## 2023-09-12 PROCEDURE — 96116 PR NEUROBEHAVIORAL STATUS EXAM BY PSYCH/PHYS: ICD-10-PCS | Mod: 59,HCNC,95, | Performed by: PSYCHIATRY & NEUROLOGY

## 2023-09-12 PROCEDURE — 99499 UNLISTED E&M SERVICE: CPT | Mod: HCNC,95,, | Performed by: PSYCHIATRY & NEUROLOGY

## 2023-09-12 PROCEDURE — 99215 PR OFFICE/OUTPT VISIT, EST, LEVL V, 40-54 MIN: ICD-10-PCS | Mod: 25,HCNC,95, | Performed by: PSYCHIATRY & NEUROLOGY

## 2023-09-12 PROCEDURE — 3044F PR MOST RECENT HEMOGLOBIN A1C LEVEL <7.0%: ICD-10-PCS | Mod: HCNC,CPTII,95, | Performed by: PSYCHIATRY & NEUROLOGY

## 2023-09-12 PROCEDURE — 96116 NUBHVL XM PHYS/QHP 1ST HR: CPT | Mod: 59,HCNC,95, | Performed by: PSYCHIATRY & NEUROLOGY

## 2023-09-12 PROCEDURE — 99499 RISK ADDL DX/OHS AUDIT: ICD-10-PCS | Mod: HCNC,95,, | Performed by: PSYCHIATRY & NEUROLOGY

## 2023-09-12 RX ORDER — MULTIVIT WITH MINERALS/HERBS
1 TABLET ORAL DAILY
Qty: 30 TABLET | Refills: 3 | Status: SHIPPED | OUTPATIENT
Start: 2023-09-12

## 2023-09-12 RX ORDER — SUVOREXANT 10 MG/1
1 TABLET, FILM COATED ORAL NIGHTLY
Qty: 30 TABLET | Refills: 3 | Status: SHIPPED | OUTPATIENT
Start: 2023-09-12

## 2023-09-12 NOTE — PROGRESS NOTES
[facility]     PATIENT: Shelby Thompson  VISIT DATE: 2023  MRN: [id number]  PRIMARY PROVIDER: Emanuel Arevalo Jr., MD  : 1950       Chief complaint: Progressive Cognitive Impairment     History of present illness:      The patient is a 74-year-old right-handed female who presents today to the [facility] due to concerns related to Progressive Cognitive Impairment.  The patient is accompanied by the  who participates in providing history.  Additional information is obtained by reviewing available medical records.     Relevant Background/Context  Known Relevant Family history:  Mother -  at age 40s murdered  Father -  at age 63 cancer  Nephew with MM in 40-50s  Dementia in her sister  Fibroids in her daughter  Hypertension in her daughter, maternal grandmother, and son  Neurocognitive Disorder:  Sister - EOAD onset mid 60 alive mid 70s, hallucinations onset in 70s  Movement Disorder:  The family denies a history of movement disorder (PD, PDD, tremor, etc).  Motorneuron Disorder:  The family denies a history of motor neuron disease (ALS).  Developmental Disorder:  The family denies a history of developmental learning disorder (Dyslexia, ADHD, ASD, etc.).  Psychiatric Disorder:  The patient/family denies a history of MDD, BD, PEYTON, Schizophrenia.  Known Relevant Genetics:  There is no known relevant genetic testing available.  Developmental Milestones:  The patient/family report no known birth complications or early life problems. The patient met all developmental milestones.  Education/Learning Capacity:  The patient/family report no signs or symptoms suggestive of developmental learning disorder.  HS.  BSN+2 years, did not complete  Estimated Educational Experience: 12 years of formal education.  Social History:  The patient reports that she quit smoking about 10 years ago. Her smoking use  "included cigarettes. She started smoking about 55 years ago. She has a 11.00 pack-year smoking history. She has never used smokeless tobacco. She reports previous alcohol use. She reports that she does not use drugs.  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)  Drug-induced polyneuropathy  Hemiplegia and hemiparesis following cerebral infarction affecting right dominant side  Recurrent major depressive disorder, in full remission  Chronic obstructive pulmonary disease, unspecified COPD type  Hyperlipemia  Thoracic aortic aneurysm without rupture  Aortic atherosclerosis  PAD (peripheral artery disease)  Pulmonary heart disease  PVC (premature ventricular contraction)  Essential hypertension  Budd-Chiari syndrome  Thrombocytopenia, unspecified  Multiple myeloma in relapse  Metastatic multiple myeloma to bone  Status post autologous bone marrow transplant  Multiple myeloma in remission  Type 2 diabetes mellitus without complication, without long-term current use of insulin  Liver mass  Osteopenia     Neurocognitive Disorder Features  Onset/Duration:  Apr 2017 (~6-year)  First Symptom:  Memory impairment  Progression:  Fluctuating  Clinical Course:  Electronic Medical Record (Approximately 2014)  Type: Chart Review. She was seen by Dr. Johnson in [facility], most recently in 2014, who noted that her motor symptoms were most consistent with "hemorrhagic thalamic outflow tremor +/- hemiballism. " She also had mild right hemiparesis. She has not seen movement neurology since that time. While she was noticing cognitive changes, she was able to continue working in her role as a  at the [employer].  Electronic Medical Record (Approximately 2020)  Type: Chart Review. Ms. the patient continually referred to 2019 as the time of her second stroke, " "while medical records document strokes in 2008 and 2011. She seemed to be referencing a hospitalization in 4/2019 that led to the diagnosis of multiple myeloma. Treatment course per medical records: "She achieved and tolerated well VRd x completing 7, 28 day cycles and achieving deep VGPR to induction. Then underwent Melphalan autologous stem cell transplant on 2/12/2020. " Maintenance with velcade started on 5/2020. She relapsed about 1 year after transplant. Per medical records: "serial biochemical relapse Noted and given this and high risk CG transitioned to weekly Sravanthi/Noble (1mg/m2) /dex Salvage to which she is tolerating and responding to well as of 1/20/22 biochemical studies.  Neurologist (01/17/2022)  Type: Chart Review. Under treatment for multiple myeloma (she had a prior bone marrow transplant, and now received chemotherapy) , has presented to the Clinic with a four year history of progressive problems with short term memory. She states that she may forget objects at home, or have difficulty tracking appointments. However, she also notes that she may lose her place in a conversation--forget what she was discussing, The problems began two years before the patient's diagnosis of multiple myeloma. The patient was accompanied to the examination by a her friend. Her friend pointed out that while that the patient was walking with her, two or three months ago, she pointed out the presence of a dog in their path. There was no dog present, in reality. The patient is uncertain as to details of the event. This finding has not recurred. However, it is noted that both velcade (for multiple myeloma) and alirocumab (for lowering of cholesterol) may promote hallucinations. Noted in the patients history, and under consideration as an etiology for memory issues, is that of a history of stroke. The patient has a history of hemorrhagic stroke in 2008 (in the region of the thalamus), and a history of hemorrhagic stroke in 2011 " (in the region of the left posterior frontal lobe). Residual effects of these stroke events have included dysarthria (mild),, and right sided weakness. The patient notes that over time, she has developed difficulty with walking distances due to strength and due to balance. In the last year, the patient reports a tremor at the right arm that has made it difficult for her to write. While she has been seen by neurologists in the past, records here do not indicate neurological followup in the [facility]. MRI scans of the brain were reviewed from 4-22-13 and 4-30-19. They reveal bilateral basal ganglia stroke, and thalamic stroke. The earlier study shows additional evidence of prior hemorrhage at the right posterior inferior temporal lobe and the left occipital lobe. An MRI scan of the brain, without and with contrast, is pending. 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 The patient takes gabapentin for symptoms of discomfort with peripheral neuropathy. The patient denies any recent stroke events, or TIA symptomatology.  Neuropsychologist (02/16/2022)  Type: Chart Review. Ms. the patient reported progressive cognitive decline. Medical records also document collateral informants reporting further cognitive dysfunction. Ms. the patient is primarily frustrated by word finding difficulties, noting that she can become so upset that it brings her to tears. She also is prone to losing her train of thought. She has not participated in speech therapy in years. She also reported memory loss, but had trouble providing examples of forgetfulness. She indicated that her  now provides support/oversight in all complex activities of daily living. She reported worsening right hemiparesis, to the extent that it's hard for her to hold a pencil in her right hand. She indicated that her oncologist  told her that this may be related to chemotherapy. Endorsed, she reported visual hallucinations over the past 6-12 months. She tends to see people with animals, mainly in the distance. She recalled being at the hospital and seeing one of her doctors walking their dog in the distance when looking out the window. There are also times when she sees children playing in the street or someone playing with a dog on the porch. She recalled an few instances when she was convinced her 3 year old granddaughter was at the house, but she wasn't. Ms. the patient is a 71 year old female with multiple strokes (2008, 2011), possible cerebral amyloid angiopathy, and multiple myeloma (2019) s/p autologous stem cell transplant in 2020. Currently maintained on Sravanthi/Noble/Dex. Ms. the patient and her family report post-stroke cognitive dysfunction, but with progressive decline over the past several years. Her  now provides support/oversight in all complex activities of daily living. She is scheduled for neuropsychological testing on 3/7 for further clarification of cognitive strengths/weaknesses. Full diagnostic impressions to follow.  Neurologist (02/22/2022)  Type: Chart Review. Accompanied by good her friend. Started after stroke (3 total). First stroke was 11 years ago, most recent stroke she believes was about 7 years. Tremors started after this and has worsened in the past year - year and a half. Only in the R hand. Always present. She has to hold her arm down with her left hand. Affects her when she eats. Eating with her left hand. No tremors in her left hand. Balance issues since the strokes. Had a bone marrow transplant a few years ago. Notices tremor at rest as well. No falls. When she was getting chemo, she thought she saw a doctor with a dog. Sees dogs. She states it has only happened twice but her friend believes it happens more than that. No scary hallucinations. Scheduled for neuropsychology testing March 7th. [facility]  amplitude resting tremor of R hand. High amplitude, chaotic tremor of R hand, postural and kinetic, worsen in wing beating position and outstretched arm. Outflow tremor, mild choreoathetosis of RUE as well. Similarly there is mild choreoathetosis of RLE only on distraction.  Neuropsychologist (03/07/2022)  Type: Chart Review. Ms. the patient is a 71 year old female with multiple strokes (2008, 2011), possible cerebral amyloid angiopathy, and multiple myeloma (2019) s/p autologous stem cell transplant in 2020 and currently maintained on Sravanthi/Noble/Dex. Ms. the patient and her family report post-stroke cognitive dysfunction (particularly after the second stroke), but with progressive decline since cancer diagnosis/treatment. Her  now provides support/oversight in all complex activities of daily living. Neuropsychological testing was primarily consistent with a pattern of frontosubcortical dysfunction, highlighted by reduced cognitive organization/retrieval, processing speed, verbal fluency, naming, ability to maintain a complex set, and conceptual/abstract reasoning. Her test scores and functioning are at the level of mild cognitive impairment (MCI) and close to the threshold between MCI/dementia. Etiology appears multifactorial, including cerebrovascular disease, post AutoSCT effects, and chemotherapy. She does not present with the type of duncan forgetting seen in Alzheimer's disease. Her recurring, vivid visual hallucinations do raise the possibility of Lewy Body pathology, although relatively low index of suspicion for DLB at this time. With that said, it may be difficult to assess for parkinsonism in the setting of post-stroke tremor/hemiballism. Importantly, she does not present with the duncan visuospatial dysfunction typically seen in early DLB. Regardless, it will be important to monitor her cognition/functioning over time. Several recommendations are offered to assist in her care/treatment.  [facility] - Emanuel Drummond MD. Neurologist (04/27/2022)  Type: Chart Review. RESCHEDULED LAST MOMENT.  [facility] - Emanuel Drummond MD. Neurologist (02/03/2023)  Type: Chart Review. The patient presents with her friend who is the primary historian. Additional history is gathered from review of previous medical records. The patient has a long and complicated history CNS injuries. The patient had 1st hemorrhagic stroke of the thalamus in 2008 resulting in transient aphasia. The patient regained her speech over the next 3 years she had a 2nd hemorrhagic stroke in 2011 in the left posterior frontal lobe. Review of brain imaging indicates an AVM in left thalamic region likely the provoking factor in her hemorrhagic stroke. The patient would gradually improve following these events however would have a persistent thalamic outflow tremor and dysarthria following the last hemorrhagic stroke in 2011. Beginning around 2017 her family became increasingly concerned cognitive deficits. The patient is not available at the time of presentation however her friend reports that during this time her  came up to the patient's her friend reported that he was concerned that her increasing amnesia train of thought deficits and asked her friend to keep an eye out on her. Symptoms were mildly progressive thereafter however not significantly interfering with day-to-day activities. The patient was diagnosed with multiple myeloma and 2019 with undergo multiple rounds of chemotherapy s/p autologous stem cell transplant in 2020. In the setting of ongoing chemotherapy and multiple medications the patient reports increasing fatigue in train of thought deficits. In 2021 the patient began having hallucinations. Hallucinations and she started out her seeing a dog while at [facility] during transfusion for chemotherapy. This eventually increased to seen people and animals mostly at a distance but often in her house sometimes in  hospital are sometimes out of her window. This increased to a time seeing children playing in the street playing with the dog on the porch. The majority of the time the patient has been able to recognize these were hallucinations however has been convinced on multiple occasions. There were no command hallucinations fearful hallucinations or paranoia. Hallucinations do not appear to increase in frequency during the night and are not strictly associated with sleep disorder. In the setting of increasing fatigue and hallucinations patient's her family reported increasing concerns of amnesia train of thought deficits. The patient was evaluated by neuropsychology in 2022 and diagnosed with executive predominant mild cognitive impairment. On presentation the patient reports concerns of increasing amnesia train of thought deficits however has limited insight into severity of cognitive impairment. The patient is fully aware of her hallucinations though has difficulty recognizing reality hallucinations time. her family deny any significant other visual spatial deficits language problems aside from baseline dysarthria secondary to thalamic stroke. The patient is otherwise largely independent however shared responsibilities for her  for the majority of instrumental activities of daily living. The patient her  has had multiple cardiac events and they are both dependent on 1 another for maintenance of household responsibilities. Primary questions posed by her family today are whether hallucinations are due to medication side effect or part of her cognitive impairment. Primary concerns focalized by her family include need for additional care support at home and prognostication. The observations made above, were discussed with the patient and her family. We recommend screening for reversible causes of cognitive impairment with serum laboratories. We have discussed opportunities for biomarker testing ([facility]  biomarkers, IDEAs Amyloid-PET, Syn-One skin biopsy)   Such as skin biopsy to confirm the presence of Lewy body disease. We have placed referrals to sleep medicine due to signs and symptoms suggestive of sleep apnea to help maintain brain health.     Current Presentation  Recent/Interim History:  Since last time seen the patient completed serum laboratories and EKG. EKG was within normal limits. Serum laboratories mild B12 deficiency. We discussed the value additional diagnostic testing. Patient's hallucinations may be due to hallucinosis versus Lewy body disease. We discussed the value additional diagnostic testing inform skin biopsy to rule out an alpha synuclein mediated process versus appear vascular dementia. her family would like to proceed given lack confirmatory diagnosis. The patient does have severe amnesia insomnia and hallucinations. Clinical presentation remains consistent with Lewy body disease however confounded by diagnosis of vascular dementia. Recommend starting B12 supplement for vitamin B12 deficiency on previous evaluation. Recommend starting vitamin B1/ thiamine due to history of thiamine deficiency. Recommend skin biopsy for confirmation of diagnosis of Lewy body disease. her family decline social work support at this time.  Unresolved Concern(s) reported by patient/family:  Multiple myeloma s/p chemo and transplant - velcade  alirocumab causes hallucinations?  CVA - dysarthria (mild),, and right sided weakness.  Tremor - RUE thalamic outflow tremor, 2/2 L thalamic infarct  Gait - sensory ataxia likely multifactorial 2/2 multiple myeloma, chemo-induced peripheral neuropathy  Hallucinations     Review of cognitive, visuospatial, motor, sensory, and behavioral systems:     Memory:   The patient's memory has worsened in the past few years.  She does repeat statements or asks the same question repeatedly.  She does have difficulty remembering recent important conversations.  She does have  difficulty remembering recent events.  She does forget information within minutes.  Her recent retrograde memory is intact.  Her remote memory is intact.  Attention:   The patient's attention and concentration are impaired.  She does have attentional fluctuations.  She does have difficulty with selective attention.  She does become easily distracted.  She does have difficulty with divided attention.  Executive:   The patient's cognitive processing speed is slower.  She does have difficulty with working memory.  She does misplace personal items (e.g., keys, cell phone, wallet) more frequently.  She does have difficulty keeping track of her medications.  She does have difficulty with planning/organizing/completing multistep tasks.  She does have not difficulty with executive attention.  She does have difficulty with flexible thinking.  She does not have difficulty with response inhibition.  She denies new impulsivity or rash/careless actions.  Her judgment is intact.  Language:   The patient's speech is affected.  She does forget people's names more frequently.  She does not have word-finding difficulties.  Her speech is fluent and non-effortful.  Her speech is grammatically intact.  She does not make word substitutions.  She does not have difficulty reading.  She does not appear to have impaired comprehension.  Visuospatial:   The patient has new visuospatial problems.  She has become confused or disoriented in *new*, unfamiliar places.  She does have trouble navigating.  She does not get lost in familiar places.  She does not have visuospatial disorientation.  She does not have difficulty recognizing objects or faces.  She denies problems with driving or parking.  Motor/Coordination:   The patient does have difficulty with walking.  She does feel imbalanced.  She has fallen.  She reports new muscle weakness.  She does have difficulty buttoning shirts, operating zippers, or manipulating tools/utensils.  Her  handwriting has not become micrographic.  She does have a resting tremor.  She denies having any new involuntary movements and/or muscle jerking.  She does not have swallowing difficulty.  She denies new muscle cramps and twitching.  Sensory:   The patient denies new numbness, tingling, paresthesias, or pain.  The patient reports new loss of vision, blurry vision, and/or double vision.  The patient denies new loss of hearing or worsening tinnitus.  The patient denies anosmia.  Sleep:   The patient reports difficulty sleeping.  The patient does have difficulty going to sleep.  The patient denies difficulty staying asleep or frequently awakening at night.  The patient does not snore or have witnessed apneas while sleeping.  When she wakes up in the morning, she does not feel well-rested.  She denies dream-enactment behavior.  She denies symptoms suggestive of restless leg syndrome.  Behavior:   The patient's personality has changed.  She does not have symptoms of disinhibition and social inappropriateness.  She does not have symptoms to suggest a loss of manners or decorum.  She does not appear apathetic or has decreased motivation.  She does appear to have had a change in behavioral/emotional inertia.  There is no report that The patient has had a change in their emotional expression.  She does not have emotional blunting or lability.  She does have symptoms of irritability and mood lability.  She does not have symptoms of agitation, aggression, or violent outbursts.  Her insight into his health and situation is intact.  Her personal hygiene is intact.  She is not exhibiting a diminished response to other people's needs and feelings.  She is not exhibiting a diminished social interest, interrelatedness, or personal warmth.  She denies restlessness.  She denies new and/or worsening simple repetitive behaviors.  Her speech has not become simplified or become repetitive/stereotyped.  She denies new/worsening complex  repetitive/ritualistic compulsions and behaviors.  She does not have symptoms of hyper-religiosity or dogmatism.  Her interests/pleasures have not become restrictive, simplified, interrupting, or repetitive.  She denies a change of self-stimulating behavior.  She denies any changes in eating behavior.  She denies increased consumption of food or substances.  She denies oral exploration or consumption of inedible objects.  Psychiatric:   She does feel depressed.  She is exhibiting symptoms of social withdrawal/indifference.  She denies anxiety.  She does not exhibit cycling behavior.  She does not exhibit hyperactive behavior.  She is exhibited symptoms of paranoia.  She does have delusions.  She does have hallucinations.  She does have a history of sensitivity to neuroleptic/psychotropic medications.  Medical Review of Systems:   The patient does not have constipation.  The patient does not have urinary incontinence.  The patient denies orthostatic lightheadedness.  The patient's weight is unstable.  Functional status:  Difficulty performing the following Instrumental ADLs:  Housekeeping: No  Food Preparation: No  Shopping: No  Ability to Handle Finances: Yes  Transportation/Driving: No  Household Appliances/Stove: No  Laundry: No  Difficulty performing the following Basic ADLs:  Dressing: No  Bathing: No  Toileting: No  Personal hygiene and grooming: No  Feeding: No  Care Management:  Patient/Family Safety Concerns:  Medication Adherence: No  Home Safety: No  Wandered: No  Firearms: No  Fall Risk: No  Home Alone: No          Past Medical History:   Diagnosis Date    Acute respiratory failure with hypoxia 4/30/2019    FUENTES (acute kidney injury) 5/1/2019    Allergy     Anemia     Aortic aneurysm     Breast cancer 10/2011    left breast Stage 0 DCIS    Cataract     Chronic diastolic congestive heart failure 11/6/2015    Colon polyp     Community acquired pneumonia of right lower lobe of lung 8/3/2021    GERD  (gastroesophageal reflux disease)     Hiatal hernia     History of colonic polyps     Hx of psychiatric care     Zoloft    HX: breast cancer     Hyperlipemia     Hypertension     ICH (intracerebral hemorrhage)     Immunocompromised 10/28/2022    Major depressive disorder, single episode, mild 6/23/2016    Nuclear sclerosis 7/21/2014    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 7/21/2014    PAD (peripheral artery disease) 11/6/2015    Psychiatric problem     Statin-induced rhabdomyolysis     4/2015     Stroke 4/2011    Type 2 diabetes mellitus with diabetic peripheral angiopathy without gangrene, with long-term current use of insulin 3/31/2021    Type 2 diabetes mellitus without complication, without long-term current use of insulin 2/10/2020    Walker as ambulation aid        Past Surgical History:   Procedure Laterality Date    APPENDECTOMY      BONE MARROW BIOPSY Left 10/3/2019    Procedure: Biopsy-bone marrow;  Surgeon: Jere Tineo MD;  Location: [facility] OR [address] [place] [address];  Service: Oncology;  Laterality: Left;    BREAST BIOPSY Left 10/2011    BREAST LUMPECTOMY Left 2011    DCIS    COLONOSCOPY      COLONOSCOPY N/A 1/9/2020    Procedure: COLONOSCOPY;  Surgeon: Quang De La Cruz MD;  [facility]  Service: Endoscopy;  Laterality: N/A;    CYST REMOVAL      back    ESOPHAGOGASTRODUODENOSCOPY  07/2016    duodenal angioectasia    ESOPHAGOGASTRODUODENOSCOPY N/A 1/9/2020    Procedure: EGD (ESOPHAGOGASTRODUODENOSCOPY);  Surgeon: Quang De La Cruz MD;  Location: [facility] (4TH FLR);  Service: Endoscopy;  Laterality: N/A;    FOOT FRACTURE SURGERY  10/2012    right foot, with R hallux valgus repair    FRACTURE SURGERY Right     broken toe repiar    HEMORRHOID SURGERY      LIVER BIOPSY  04/2015    essentially normal, no fibrosis    TUBAL LIGATION      UPPER GASTROINTESTINAL ENDOSCOPY         Family History   Problem Relation Age of Onset    Dementia Sister         x1 sister     Cancer Sister 63        Lung Cancer    No Known Problems Mother     Cancer Father     No Known Problems Brother     Hypertension Daughter     Fibroids Daughter         uterine    Hypertension Son     No Known Problems Brother     No Known Problems Maternal Aunt     No Known Problems Maternal Uncle     No Known Problems Paternal Aunt     No Known Problems Paternal Uncle     Hypertension Maternal Grandmother     No Known Problems Maternal Grandfather     No Known Problems Paternal Grandmother     No Known Problems Paternal Grandfather     Ovarian cancer Neg Hx     Colon cancer Neg Hx     Tremor Neg Hx     Amblyopia Neg Hx     Blindness Neg Hx     Cataracts Neg Hx     Diabetes Neg Hx     Glaucoma Neg Hx     Macular degeneration Neg Hx     Retinal detachment Neg Hx     Strabismus Neg Hx     Stroke Neg Hx     Thyroid disease Neg Hx     Esophageal cancer Neg Hx     Rectal cancer Neg Hx     Stomach cancer Neg Hx     Ulcerative colitis Neg Hx     Crohn's disease Neg Hx     Irritable bowel syndrome Neg Hx     Celiac disease Neg Hx        Social History     Socioeconomic History    Marital status:      Spouse name: Moises    Number of children: 2   Tobacco Use    Smoking status: Former     Current packs/day: 0.00     Average packs/day: 0.3 packs/day for 44.2 years (11.1 ttl pk-yrs)     Types: Cigarettes     Start date:      Quit date: 2011     Years since quittin.4    Smokeless tobacco: Never   Substance and Sexual Activity    Alcohol use: Not Currently     Comment: none since 2019, very rare wine     Drug use: No    Sexual activity: Yes     Partners: Male     Birth control/protection: Post-menopausal   Social History Narrative     -Moises    2 children (Delia Li)     Social Determinants of Health     Financial Resource Strain: Low Risk  (2023)    Overall Financial Resource Strain (CARDIA)     Difficulty of Paying Living Expenses: Not hard at all   Food Insecurity: No Food  Insecurity (6/19/2023)    Hunger Vital Sign     Worried About Running Out of Food in the Last Year: Never true     Ran Out of Food in the Last Year: Never true   Transportation Needs: No Transportation Needs (6/19/2023)    PRAPARE - Transportation     Lack of Transportation (Medical): No     Lack of Transportation (Non-Medical): No   Physical Activity: Inactive (6/19/2023)    Exercise Vital Sign     Days of Exercise per Week: 0 days     Minutes of Exercise per Session: 0 min   Stress: No Stress Concern Present (6/19/2023)    [employer] - Occupational Stress Questionnaire     Feeling of Stress : Only a little   Social Connections: Socially Isolated (6/19/2023)    Social Connection and Isolation Panel [NHANES]     Frequency of Communication with Friends and Family: Once a week     Frequency of Social Gatherings with Friends and Family: Once a week     Attends Judaism Services: Never     Active Member of Clubs or Organizations: No     Attends Club or Organization Meetings: Never     Marital Status:    Housing Stability: Low Risk  (6/19/2023)    Housing Stability Vital Sign     Unable to Pay for Housing in the Last Year: No     Number of Places Lived in the Last Year: 1     Unstable Housing in the Last Year: No       Medication:     Current Outpatient Medications on File Prior to Visit   Medication Sig Dispense Refill    acyclovir (ZOVIRAX) 400 MG tablet TAKE 1 TABLET BY MOUTH TWICE A  tablet 3    albuterol (ACCUNEB) 1.25 mg/3 mL Nebu Take 3 mLs (1.25 mg total) by nebulization every 6 (six) hours as needed (wheeze/cough). Rescue 75 mL 2    amLODIPine (NORVASC) 5 MG tablet TAKE 1 TABLET BY MOUTH EVERY DAY 90 tablet 2    aspirin (ECOTRIN) 81 MG EC tablet TAKE 1 TABLET BY MOUTH EVERY DAY 90 tablet 3    cholecalciferol, vitamin D3, 125 mcg (5,000 unit) Tab 1 tablet DAILY (route: oral)      cyanocobalamin, vitamin B-12, 5,000 mcg Subl Place one under tongue once a day for 1  month, then continue to take under tongue per week 30 tablet 3    DARZALEX FASPRO 1,800 mg-30,000 unit/15 mL Soln       ENGERIX-B, PF, 20 mcg/mL Syrg       evolocumab (REPATHA SURECLICK) 140 mg/mL PnIj Inject 1 mL (140 mg total) into the skin every 14 (fourteen) days. 6 mL 3    ferrous gluconate 324 mg (37.5 mg iron) Tab tablet 1 tablet DAILY (route: oral)      gabapentin (NEURONTIN) 300 MG capsule Take 1 capsule (300 mg total) by mouth 2 (two) times daily. 180 capsule 2    GADAVIST 1 mmol/mL (604.72 mg/mL) Soln injection       loperamide (IMODIUM) 2 mg capsule TAKE 1 CAPSULE BY MOUTH 4 TIMES DAILY AS NEEDED FOR DIARRHEA. 60 capsule 0    metFORMIN (GLUCOPHAGE) 1000 MG tablet Take 1 tablet (1,000 mg total) by mouth 2 (two) times daily with meals. 180 tablet 3    omeprazole (PRILOSEC) 40 MG capsule TAKE 1 CAPSULE BY MOUTH EVERY DAY 90 capsule 3    OXYGEN-AIR DELIVERY SYSTEMS MISC 1-2 Liter O2 - CONTINUOUS (route: Oxygen)      pomalidomide (POMALYST) 2 mg Cap Take 2 mg by mouth Daily. 21 capsule 0    potassium chloride SA (K-DUR,KLOR-CON) 20 MEQ tablet       valsartan (DIOVAN) 160 MG tablet Take 1 tablet (160 mg total) by mouth once daily. 90 tablet 3    zoledronic 4 mg/100 mL PgBk infusion       zoledronic acid (ZOMETA) 4 mg/5 mL injection        No current facility-administered medications on file prior to visit.        Review of patient's allergies indicates:   Allergen Reactions    Lipitor [atorvastatin] Itching     Itching, elevated cpk, muscle aches, statin induced myositis       Medications Reconciliation:   I have reconciled the patient's home medications and discharge medications with the patient/family. I have updated all changes.  Refer to After-Visit Medication List.    Objective:  Vital Signs:  There were no vitals filed for this visit.  Wt Readings from Last 3 Encounters:   08/11/23 0900 65.1 kg (143 lb 8.3 oz)   07/14/23 0959 65.1 kg (143 lb 8.3 oz)   07/14/23 0902 65.1 kg (143 lb 8.3 oz)     There is no  height or weight on file to calculate BMI.           Neurological examination:  Mental Status:   Her appearance deviates from typical expectations given their age and context. Comment: looks older than stated age;  Throughout the interview, she is cooperative, her eye contact is appropriate.  Her behavior was inappropriate to the clinical context and situation.  Her behavior was not characterized by episodes of sudden uncontrollable and inappropriate laughing or crying.  The patient's energy level is abnormal. Comment: Hypoactive, Fluctuating;  Her orientation is not entirely accurate.  Her attention/concentration is impaired.  She is unable to complete three-step commands without making errors.  Her fund of knowledge was appropriate for age, culture, and level of education.  Her thought process is not logical or goal-oriented. Comment: circumstantial, bradyphrenia;  She demonstrated impaired insight based on actions, awareness of her illness, plans for the future.  She demonstrated good judgment based on actions and plans for the future.  She has no evidence of hallucinations (auditory, visual, olfactory).  She has no evidence of delusions (paranoid, grandiose, bizarre).  Fundoscopic examination:   Her fundoscopic evaluation did not show evidence of pathology; she had clear optic disk margins without exudates or hemorrhages.  Cranial Nerves:   She showed no evidence of anosmia 3/3 (coffee/vanilla/cinnamon).  Her pupils were normal.  Her elemental visual acuity assessment demonstrated normal OS vision and normal OD vision.  Her visual fields were full to confrontation in all quadrants.  Assessment of extinction with double/simultaneous stimuli was negative in her bilateral visual fields.  Her ocular pursuit in the horizontal and vertical plane was complete.  Her saccades were abnormal. Comment: decreased initiation;  She demonstrated no square-wave jerks.  Her eyelid assessment showed no apraxia. There was no eyelid  "dysfunction, retraction, or king sign.  Her blink rate was abnormal. Comment: decreased;  Her facial strength was normal.  Her facial expression was abnormal. Comment: Hypomimia;  Her facial sensation was intact to light touch bilaterally.  Her hearing was normal bilaterally.  She does not require the use of hearing aids.  Her oropharynx and soft palate appeared abnormal. Comment: mallampati 4;  Her uvula is mid-line.  Her tongue showed no evidence of scalloping.  She tongue movement with normal.  Her sternocleidomastoid and trapezius muscle strength was full bilaterally.  She had no significant evidence of anterocollis or retrocollis.  Speech/Language:   The patient's speech was not completely fluent and non-effortful.  Her speech timbre is abnormal. Comment: quiet;  Her speech rate is abnormal. Comment: slow;  Her respirations are abnormal. Comment: shallow;  Her speech timbre is normal.  She made articulation (segmental features) errors.  She has no speech dysdiadochokinesia with repetition of syllables such as "/PA/, /TA/, /KA/, /OM/".  She made errors during the repetition of rapid syllables and or words. Comment: 'caterpillar' '', and 'huckleberry'; 'caterpillar' '', and 'huckleberry'  She has no repetition errors of rapid sequences of consonants, such as in "[employer] Shinto" or "[employer]".  She made prosody (suprasegmental features) errors.  Her stress assessment showed no repetition errors in linguistically complex words, including multisyllabic words ("planetarium," "questionable," "accomplishment," "phonetic.  Her pitch assessment showed normal range, intonation (Pitch Pattern), and variability.  Her tone was not emotionally limited, and tempo was rhythmic (e.g., "Once upon a midnight dreary, while I pondered, weak and weary, Over many a quaint and curious volume of forgotten allen" and "That government of the people, by the people, for the people, shall not " "perish from the earth").  The patient's speech is dysarthric.  She does not have flaccid dysarthria.  She does not have spastic dysarthria.  She does not have ataxic dysarthria.  She does not have hypokinetic dysarthria.  She does not have hyperkinetic dysarthria.  The patient showed evidence of anomia. Comment: Mild;  She showed evidence of anomia during spontaneous speech.  She showed evidence of anomia during confrontational naming. Comment: Mild;  She makes phonological loop errors. Comment: Mild;  She makes no errors during the repetition of gibberish words (e.g., "Supercalifragilisticexpialidocious," "Pigglywiggly," "Woospiedoo," "Zowzy," "Bazinga").  She makes errors during the repetition of complex meaningless phrases. Comment: 'The horse raced past the barn fell.', 'The complex houses  and single soldiers and their families,' 'Wishes are hopping, and trees are west,' and 'Brushing liked to tutu dark's direction'.; 'The horse raced past the barn fell.', 'The complex houses  and single soldiers and their families,' 'Wishes are hopping, and trees are west,' and 'Brushing liked to tutu dark's direction'.  She can comprehend commands that cross the midline (e.g., with your left thumb, touch your right ear).  She has difficulty comprehending commands that depend on syntax. Comment: 'point to the ceiling after you point to the floor'.; 'point to the ceiling after you point to the floor'.  Her speech is grammatically intact; (no function/semantic word substitutions, phonemic/semantic paraphasias, or binary confusion).  She makes no superordinate errors when asked to draw an animal (e.g., elephant/giraffe).  She does not have difficulty understanding puns with multiple meanings. "One morning I shot an elephant in my pajamas. How he got into my pajamas I'll never know.", "Time flies like an arrow. Fruit flies like a banana.", "The best way to communicate with a fish is to drop them a line."  Motor: "   The patient's bilateral upper extremity muscle bulk is appropriate.  The patient's upper extremity muscle tone is increased. Comment: B/L, R>L, Mod;  The patient's bilateral upper extremity muscle tone does not suggest spasticity.  The patient's bilateral upper extremity muscle tone does not suggest rigidity/cogwheeling.  There is evidence of paratonia. Comment: Muscle tone is increased and there is evidence of paratonia.; Muscle tone is increased and there is evidence of paratonia.  There was no dystonia observed on examination.  Assessment of motor strength was symmetric and at minimal anti-gravity.  Deltoid L +5/5 R +5/5 Biceps L +5/5 R +5/5 Triceps L +5/5 R +5/5 Wrist extension L +5/5 R +5/5 Finger abduction L +5/5 R +5/5 Hip flexion L +5/5 R +5/5 Hip extension L +5/5 R +5/5 Knee flexion L +5/5 R +5/5 Knee extension L +5/5 R +5/5 Ankle flexion L +5/5 R +5/5 Ankle extension L +5/5 R +5/5  There is no pronator or downward drift.  There is no upward drift.  There is no outward/diagonal drift.  There is no myoclonus observed in The patient's bilateral upper and lower extremities.  There are no fasciculations observed in The patient's bilateral upper and lower extremities.  Coordination:   She showed evidence of upper extremity limb dysmetria during past pointing on finger-nose-finger. Comment: R/L; R/L  She has no bilateral lower extremity limb dysmetria during shin rub.  She showed evidence of limb dysdiadochokinesia of the upper extremity on the pronation/supination test.  She has no visible tremor.  She has evidence of interhemispheric motor control deficits.  She demonstrates evidence of motor overflow.  She demonstrates no alien limb phenomena.  She has dyskinetic movements.  She has no athetosis.  She has choreiform movements.  She has no ballistic movements.  She has evidence of akathisia.  She has no tardive dyskinesia.  The patient's upper extremity fine motor coordination was abnormal. Comment: B/L, R>L,  Mild;  The patient's bilateral upper extremity coordination with finger tapping, pronation/supination, and the open-close fist showed slowing.  The patient's bilateral upper extremity coordination with finger tapping, pronation/supination, and the open-close fist showed hypometria.  The patient's upper extremity fine motor coordination was not dysrhythmic. Comment: finger tapping, pronation/supination, and the open-close fist showed dysrhythmia.; finger tapping, pronation/supination, and the open-close fist showed dysrhythmia.  Higher Cortical Function:   She had no hemineglect (e.g., line bisection/Eren's test).  The patient showed no evidence of simultanagnosia (Navon hierarchical letters).  She demonstrates no evidence of dorsal simultanagnosia (overlapping objects).  She demonstrates no evidence of ventral simultanagnosia (complex picture synthesis).  The patient showed evidence of visuospatial constructional dysfunction.  The patient showed no evidence of agnosia.  The patient showed evidence of angular gyrus disconnection (insular-operculum).  She has no finger agnosia.  She has no left-right confusion.  She has evidence of dysgraphia.  The patient showed evidence of apraxia.  She showed evidence of ideomotor apraxia performing tool-use pantomimes. Comment: use a hammer, use a screwdriver, use a comb, flip a coin, waving goodbye.; use a hammer, use a screwdriver, use a comb, flip a coin, waving goodbye.  She showed no evidence of ideational apraxia (e.g., taking off and putting on shoes, folding paper into an envelope).  She showed evidence of limb-motor apraxia during mimicking complex bimanual hand shapes.  She showed no evidence of buccofacial apraxia (e.g., blow out a candle, puff out cheeks, and whistle).  She showed dysexecutive behavior.  She showed no utilization or imitation behavior.  She has no perseverative or stereotyped behavior.  She has no stimulus-bound behavior.  Sensory:   Her cortical  sensory assessment demonstrated no neglect bilaterally.  She he had no astereognosis (paper clip, ring, dime) bilaterally in the palms.  She he had no agraphesthesia (drawing numbers) in the palms.  Her sensation was diminished to light touch, and vibratory sense. Comment: in the bilateral upper and lower extremities in a length-dependant pattern.; in the bilateral upper and lower extremities in a length-dependant pattern.  Her sensation was intact to temperature/pinprick in the bilateral upper and lower extremities.  Her sensation was intact to joint position sense in the bilateral upper and lower extremities.  Reflexes:   Reflexes were symmetric and 2+ at biceps, 2+ triceps, and 2+ brachioradialis, 2+ at the knees bilaterally, there was no cross-abductor sign, 2+ in the bilateral ankles.  There was no spreading/clonus.  There were no pathological reflexes; No Babinski, bilateral plantar toes go down, no Jaw Jerk Reflex, No Bermudez, No Palmomental reflex, and No Palmar Grasp reflex.  Gait:   She has normal posture sitting unaided.  She is unable to rise from a chair and sit back down without using their arms.  Her gait was abnormal.  Her posture while walking is normal.  Her gait initiation/inhibition was normal.  Her stance while walking is abnormal. Comment: wide base;  Her gait speed was abnormal (70-80 F 1.13 m/s M 1.26 m/s, >80 F 0.94 m/sec, M 0.97 m/sec). Comment: slow;  Her stride (gait cycle) was abnormal. Comment: decreased step-time, decreased step-width, decreased step-length.;  Her arms swing is abnormal. Comment: B/L, R>L, Mild; asymmetric and decreased amplitude.  She takes turns in >4 steps.  She has truncal ataxia.  When attempting to walk abnormally (heels, tiptoes, tandem), she makes errors.  While walking on her tiptoes for ten steps, she makes deviations.  While walking on her heels for ten steps, She makes no deviations.  While walking tandem for ten steps, she makes deviations.  While walking  with eyes closed for ten steps, she makes deviations.  She has evidence of posture/balance impairment.  Retropulsion testing showed abnormal recovery.  Romberg's sign was not present.  She has evidence of a specific gait disorder.  She has evidence of parkinsonism gait disorder. Comment: Gait is rigid akinetic with a short step length, a narrow base, and a stooped posture involving the neck, shoulders, and trunk. Arm swing is reduced. Steppage height is reduced (shuffling). Stride variability is increased. When asked to walk faster, patients increase the step frequency rather than step length.; Gait is rigid akinetic with a short step length, a narrow base, and a stooped posture involving the neck, shoulders, and trunk. Arm swing is reduced. Steppage height is reduced (shuffling). Stride variability is increased. When asked to walk faster, patients increase the step frequency rather than step length.  She has no evidence of antalgic gait disorder.  She has no evidence of cerebellar ataxic gait disorder.  She has evidence of sensory ataxic gait disorder. Comment: Stance and gait appear broad-based and insecure. The step length is shortened. The loss of visual function causes marked worsening of ataxia (Romberg's test, walking with closed eyes).; Stance and gait appear broad-based and insecure. The step length is shortened. The loss of visual function causes marked worsening of ataxia (Romberg's test, walking with closed eyes).  She has no evidence of neuropathic/high-steppage gait disorder.  She has no evidence of cautious gait disorder.  She has no evidence of spastic gait disorder.  She has no evidence of frontal gait disorder.  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  3/2022 MoCA =  17/30  Pre-morbid/Baseline: Estimated to be the in low average/average range.  Language: Low average letter verbal fluency. Below average semantic verbal fluency. Exceptionally low performance on a test of confrontation naming. She offered several semantic paraphasic errors, including tiger for lion, cane/walker for crutch, and balloon for parachute. She benefit from phonemic cueing.  Visuospatial: Copy of a complex figure was impacted by involuntary movements. Otherwise, her copy demonstrated an intact appreciation for the gestalt of the figure with no apparent visuospatial dysfunction. Average performance on a visual puzzles test. Difficult to read her drawing of a clock face due to motor difficulties. Performance on a test of line orientation was mildly below expectation, but her incorrect responses were consistently very close to the correct response.  Learning/Memory: Overall encoding of a surpaspan word list was low average as she recalled 3, 7, and 7 of 12 words across the learning trials. She did not freely recall any words following a long delay. Recognition was exceptionally low as she correctly identified 7/12 words with 2 false positive errors. She encoded 3/5 words after 2 trials on the MoCA. She did not freely recall any words following a brief delay. She recalled 2 words with categorical cueing and correctly identified 1/3 words with multiple choice cueing. Overall encoding of 2 short stories was low average. She retained 7/7 details from the first story following a long delay and 9/10 details from the second story. Her overall recall was average. Responses to yes/no questions pertaining to the stories was WNL.  Executive Functioning: One trial learning/encoding was below expectation. She provided 5/5 correct responses on a serial 7 subtraction task. Average performance on tests of working memory. Low average performance on a test of conceptual/abstract reasoning. Below average performance on a test  requiring her to maintain a complex set. Exceptionally low performance on tests of psychomotor processing speed. Performance improved to the low average range when the motor component was removed from the task. Performance on a card sorting testing was mildly below expectation. She was able to shift from the first to the second category with minimal trouble, but perseverated on the second category for 20 of the last 24 trials of the test.  Mood: She denied all symptoms of anxiety on a self-report inventory. She did not endorse clinical depression.  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  MOCA 20/30: MOCA Score suggestive of mild cognitive impairment.  Neurocognitive/Neurobehavioral Evaluation completed on 2023-09-12    Neuropsychiatric/Behavioral Focused Evaluation Assessment    BEHAV5+ 5/6 See ROS section for a full description   Laboratories:     Lab Date Value [Reference]   Metabolic Screening           Albumin grams/dl 2023, Feb-24    4.14 [3.35 - 5.55 g/dL]      Ferritin 2023, Feb-24    7 (L) [20.0 - 300.0 ng/mL]      Hemoglobin A1C External 2023, Feb-10    5.3 [4.0 - 5.6 %]      Lactate, [place] 02/10/2023  1.2 [0.5 - 2.2 mmol/L]      Methlymalonic Acid 2023, Feb-10    0.18 [<0.40 umol/L]      Procalcitonin 2021, Dec-05    0.11      Protein, Serum 02/10/2023  7.0 [6.0 - 8.4 g/dL]      T4 Total 02/10/2023  6.0 [4.5 - 11.5 ug/dL]      TSH 2023, Feb-10    3.662 [0.400 - 4.000 uIU/mL]      Cholesterol 2021, Nov-15    198 [120 - 199 mg/dL]      HDL 2021, Nov-15    69 [40 - 75 mg/dL]      Non-HDL Cholesterol 2021, Nov-15    129 [mg/dL]      Triglycerides 02/10/2023  235 (H) [30 - 150 mg/dL]      B12 Def. Methylmalonic Acid 02/10/2023  0.17 [<=0.40 nmol/mL]      Folate  02/10/2023  9.7 [4.0 - 24.0 ng/mL]      Thiamine 02/10/2023  52 [38 - 122 ug/L]      Vitamin B-12 02/22/2022  390 [180 - 914 ng/L]      Cerebrospinal Fluid Assessment   IgG 12/07/2021  1143 [650 - 1600 mg/dL]      Neuroendocrine/Electrolyte Screening   Magnesium 11/08/2021  1.6 [1.6 - 2.6 mg/dL]      Phosphorus 2021, Dec-07    3.2 [2.7 - 4.5 mg/dL]      Neurodegenerative Serum Fluid Assessment   NEUROFILAMENT LIGHT CHAIN, PLASMA 2023, Feb-10    19.2 [<=37.9 pg/mL]      Infectious Disease/Immunocompromised Screening   SARS-CoV-2 RNA, Amplification, Qual 02/10/2023  Negative      Syphilis Treponemal Ab 2023, Feb-10    Nonreactive [Nonreactive ]      Standard Hematology Screen   Hematocrit 2023, Feb-10    33.6 (L) [37.0 - 48.5 %]      Hemoglobin 2023, Feb-10    10.0 (L) [12.0 - 16.0 g/dL]      MCV 2023, Feb-10    79 (L) [82 - 98 fL]      Platelets 2023, Feb-10    194 [150 - 450 K/uL]           Neuroimaging:    MRI brain/head with and without contrast on 2/3/2022  Formal interpretation by Radiology:  Multifocal areas of remote parenchymal hemorrhage similar to prior exam. Distribution primarily suggestive of chronic hypertension, but few peripheral cerebral foci do raise the possibility of underlying cerebral amyloid angiopathy in a patient of this age. Mild chronic microvascular ischemic change without evidence of recent or remote major vascular distribution infarct. 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  Independently reviewed radiological imaging by Emanuel Tyler MD. MPH. Behavioral Neurologist  T1: mild dorsal predominant cortical atrophy without clear frontal component or midline component. The does appear to be mild posterior parietal atrophy but not clearly in a pattern suggestive of Alzheimer's disease. Bilateral hippocampi are largely spared. No significant subcortical atrophy pattern or hydrocephalus.  T2/FLAIR: Mild-to-moderate greater degree of subcortical  strokes in the bilateral basal ganglia with bilateral periventricular capping. Multifocal basal ganglia infarcts suggestive of hypertensive microvascular disease.  DWI/ADC: No Significant DWI hyperintensities/hypointensities. No ADC correlation.  SWI/GRE: Multiple foci of susceptibility artifact with focal volume loss in keeping with areas of prior hemorrhage. 2 cm focus in the posterior aspect of the left superior frontal gyrus, 2 cm focus in the posteroinferior right temporal lobe. Additional small left thalamic and right putaminal hemorrhages. Scattered punctate foci of prior hemorrhage in right thalamus, bilateral basal ganglia, right cerebellum with a few peripheral cerebral lesions.  Impression: : Mild generalized cortical atrophy with slight posterior parietal lobule predominance however no atrophies strongly suggestive of any specific neuro degenerative condition. Multifocal areas of remote parenchymal hemorrhage similar to prior exam. 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 Lesion likely primarily supplied via a distal right BETHANIE branches with superficial cortical venous drainage - likely explains atypical distribution of subcortical ICH.     Procedures:    Electrocardiogram on 2/10/2023  Formal interpretation:  Vent. Rate : 056 BPM     Atrial Rate : 056 BPM    P-R Int : 156 ms          QRS Dur : 088 ms     QT Int : 428 ms       P-R-T Axes : 008 040 071 degrees    QTc Int : 413 ms Sinus bradycardia with sinus arrhythmia Otherwise normal ECG  Independently reviewed Electrocardiogram by Emanuel Tyler MD. MPH. Behavioral Neurologist  Impression: : Received ECG has no evidence of sinus node disease. HR (>=50-60). Prolonged PA interval (>0.22 s). Broad QRS complex (> 0.12 s).    Electrocardiogram on 12/5/2021  Formal interpretation:  Vent. Rate : 098 BPM     Atrial Rate : 098 BPM    P-R Int : 154 ms          QRS Dur : 090 ms     QT Int : 376 ms       P-R-T Axes : 041 049 037 degrees    QTc Int : 480  ms Sinus rhythm with Premature atrial complexes Otherwise normal ECG  Independently reviewed Electrocardiogram by Emanuel Tyler MD. MPH. Behavioral Neurologist  Impression: : Received ECG has no evidence of sinus node disease. HR (>=50-60). Prolonged LA interval (>0.22 s). Broad QRS complex (> 0.12 s).     Clinical Summary:     The patient is a 74-year-old right-handed female with a relevant past medical history of CVA, HLD, HTN, multiple cancers (breast cancer, MM s/p chemo and transplant), PAD, MDD, budd-chiari syndrome, DMII and COPD, who presents reporting a 6-year history of fluctuating neurocognitive impairment.       The clinical history is suggestive of:  Memory Impairment: STM encoding impairment, LTM encoding-retrieval impairment, Amnesia of fixation  Attention Impairment: Attention, Alertness, Selective attention, Sustained attention, Shifting attention  Executive Impairment: Energization, Working Memory  Language Impairment: Language Dysfunction  Visuospatial Impairment: Allocentric Spatial Processing  Motor/Coordination Impairment: Sensory motor integration, Motor weakness, Central pattern generators dysfunction  Sensory Impairment: Sensory Deprivation  Behavior Impairment: Response Inhibition, Neurovegetative, Emotional Regulation  Psychiatric Impairment: Social Coherence, Paranoia, Fixed-False Beliefs, Hallucinations, Neuroleptic Sensitivity  Medical Review of Systems Impairment: Limbic Dysfunction  iADL Impairment: Gabe Instrumental Activities of Daily Living Scale  The neurological examination is significant for:  Cerebellar Dysfunction: dysmetria UE, dysdiadochokinesia, truncal ataxia (walking)  Cortical Frontal Dysfunction: non-fluent aphasia (fluency), agrammatic aphasia (syntax comprehension)  Cortical Frontal-Parietal Disconnection: apraxia (ideomotor, limb-motor)  Cortical Temporal Dysfunction: anomic aphasia (spontaneous, confrontational)  Cortical Temporal-Parietal Dysfunction:  logopenic aphasia (phrase phonological loop), dysgraphia  Cortical Transcallosal Disconnection: interhemispheric motor control (interhemispheric motor control ), motor efference (motor overflow)  Executive Impairment: serial processing, thought disorder  Motor Coordination: gait imbalance (tiptoes)  Movement Disorder (Gait): strength (difficulty rising), abnormal features (stance, speed, stride/cycle, abnormal swing, difficulty turning), gait syndrome (rigid-akinetic, sensory ataxia)  Movement Disorder (Hyperkinetic): dyskinetic movements, choreiform movements, akathisia  Movement Disorder (Hypokinetic): parkinsonism (diminished facial expression, bradykinesia), paratonia (tone), dyskinesia (dysrhythmia)  Movement Disorder (Ocular): abnormal ocular movement (abnormal saccades), eyelid apraxia  Movement Disorder (Speech): abnormal vocal features (volume, rate, respiration, articulation, prosody), AMR/Dysdiadochokinesia (multisyllabic)  Sensory Dysfunction: peripheral (A? fibers)  The neurocognitive battery is significant (based on age and education) for:  Amnestic predominant multidomain major cognitive impairment     BEHAV5+ 5/6: See ROS section for a full description  The neurologically relevant imaging is significant for  MRI brain/head with and without contrast (2/3/2022): Mild generalized cortical atrophy with slight posterior parietal lobule predominance however no atrophies strongly suggestive of any specific neuro degenerative condition. Multifocal areas of remote parenchymal hemorrhage similar to prior exam. 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 Lesion likely primarily supplied via a distal right BETHANIE branches with superficial cortical venous drainage - likely explains atypical distribution of subcortical ICH.        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     The patient's clinical presentation meets the criteria for Dementia (McYeann, et al. 2011 Alzheimer's & Dementia).     Concern regarding an intraindividual change in cognition diagnosed through a combination of history and objective cognitive assessment  Impairment in two or more cognitive domains  Interference with independence in functional abilities.  Represents a decline from previous levels of functioning.  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     Evidence of dementia.  Evidence of significant and/pr strategic cerebrovascular disease  Fluctuating, stepwise progression of cognitive deficits.  The early presence of gait disturbance (small-step gait or marche a petits pas, or magnetic, apraxic-ataxic or parkinsonian gait), unsteadiness, and frequent, unprovoked falls.  Pseudobulbar palsy  Personality and mood changes, abulia, depression, emotional incontinence, or other subcortical deficits including psychomotor retardation and abnormal executive function.       Conflicting with these observations are the gradual progressive decline since 2017 and new onset hallucinations since 2019 setting of chemotherapy for multiple myeloma.  Patient technically has sufficient clinical symptoms to suggest Lewy body disease ( fluctuating level of arousal and vivid well-formed hallucinations)  however symptoms may easily be explained by  thalamic dysfunction due to known AVM and prior hemorrhage.  At present, all neurodegenerative diseases can only be diagnosed with 100% certainty through a brain autopsy. The suspected neuropathology underlying the patient's neurocognitive impairment is likely a mixture of pathologies (Alzheimer's Disease Related Pathology, Lewy body disease/alpha-synucleinopathy, Vascular  Contributions to Cognitive Impairment and Dementia).  Plasma Protein Biomarkers: [02/10/2023] Neurofilament Light Chain (Nfl): 19.2.  There are no CSF protein biomarkers available on record.  There are no dermatological protein biomarkers available on record.  There is no known relevant genetic testing available.     The patient has at minimum to known strategic strokes of the prefrontal cortex and thalamus sufficient for most prominent motor and cognitive deficits however since 2017 the patient has had a gradual progressive decline in memory attention concentration. Since 2019 in the setting of ongoing chemotherapy her family reporting increasing behavioral disturbances in vivid well-formed hallucinations. Patient's thalamic outflow tremor/ choreiform movements confounds an effective assessment bradykinesia and parkinsonism  however the patient does have significant fluctuation of low arousal and the did well formed hallucinations to suggest early signs of a comorbid Lewy body disease. 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 Possible mimics of Lewy body disease would include peduncular hallucinosis.     The observations made above, were discussed with the patient and their supporting historian(s) (). We have discussed the additional diagnostic(s) and/or managenent below.     Care Management Plan:    #Diagnostic Screening for measurable forms of neurodegenerative pathology.  We have discussed opportunities for biomarker testing (CSF Hunter biomarkers, IDEAs Amyloid-PET, Syn-One skin biopsy).  We scheduled a skin biopsy for assessment of Syn-One alpha-synuclein related pathology  #Optimize Neurocognitive Impairment and Quality  We have discussed the MIND Diet and other lifestyle behavior that may help maintain brain health.  We have provided written/digital reading material  next appointment start donepezil following tolerance to Belsomra  #Optimize Behavioral Management and Quality.  No  indication for memantine at this time  #Optimize Sleep Hygiene and Quality  We discussed and recommended additional diagnostic/management of sleep disorder to optimize brain health and longevity.  Start Belsomra 10 mg at night.  #Optimize Cerebrovascular Health.  The patient has a documented history of hyperlipidemia and/or hypercholesteremia with long-term complications such as cerebrovascular disease, peripheral vascular disease, and/or aortic atherosclerosis. Collectively these risk factors may contribute to cerebral atherosclerosis, and cerebral hypoperfusion compounded neurocognitive disorder. We discussed maximizing cerebrovascular-related medical therapy, including but not limited to cholesterol medications and antiplatelet agents. We have discussed the value of aggressively controlling vascular risk factors like hypertension, hyperlipidemia, and Diabetes SBP<130, LDL<100, and A1C<7.0. We discussed the need to optimize lifestyle choices, including a heart-healthy diet (e.g., Mediterranean or DASH), increased cardiovascular exercise (goal 150 minutes of moderate-intensity per week), and staying cognitively and socially active.  Continue aspirin 81 mg q.day  #Behavioral/Environmental Strategies  We recommend engaging in activities that stimulate cognitively and socially while avoiding excessive stimulation and fatigue in overwhelmingly complex situations.  We recommend integrating routine and schedule into your daily life. https://www.alzheimersproject.org/news/the-importance-of-routine-and-familiarity-to-persons-with-dementia/  #Health Maintenance/Lifestyle Advice  We have discussed the value in aggressively controlling vascular risk factors like hypertension, hyperlipidemia, and Diabetes SBP<130, LDL<100, A1C<7.0.  We discussed the need to optimize lifestyle choices including a heart-healthy diet (e.g., Mediterranean or DASH), increased cardiovascular exercise (goal 150 minutes of moderate-intensity per  "week), and stay cognitively and socially active.  #Support  We all need support sometimes. Get easy access to local resources, community programs, and services. https://www.communityresourcefinder.org/  Learn more about Cognitive Impairment in [place]: https://www.alz.org/professionals/public-health/state-overview/[place]  #Safety  [place] has no laws against driving with dementia specifically but obviously has laws about medical conditions which impact a person's ability to drive safely. If you believe your loved one's driving capacity has diminished, please reach out to either your primary care physician or our office to discuss driving restrictions or revocation of their license. To learn more: https://www.dementiacarecentral.com/caregiverinfo/driving-problems/  The Alzheimer's Association administers the nationwide "Safe Return" program with identification bracelets, necklaces, or clothing tags and 24-hour assistance. More information is available online at https://www.alz.org/help-support/caregiving/safety/medicalert-with-24-7-wandering-support  #Follow up:  Follow-up as needed.    Thank you for allowing us to participate in the care of your patient. Please do not hesitate to contact us with any questions or concerns.     It was a pleasure seeing The patient and we look forward to seeing them at their follow-up visit.     This note is dictated on M*Modal Fluency Direct word recognition program. There are word recognition mistakes that are occasionally missed on review.         Scheduled Follow-up :  Future Appointments   Date Time Provider Department Center   9/14/2023  9:00 AM LAB, [facility] LABDRAW [facility]   9/14/2023  9:20 AM Tom Mead OD [facility] OPTOMTY [facility]   9/15/2023  8:00 AM Gem Persaud PA-C [facility] HC BMT [facility]   9/15/2023  9:00 AM NURSE Fozia, JYOTI CHEMO NOM CHEMO [facility]   10/17/2023  8:20 AM Emanuel Arevalo Jr., MD [facility]       After Visit Medication " List :     Medication List            Accurate as of September 12, 2023 11:14 AM. If you have any questions, ask your nurse or doctor.                CONTINUE taking these medications      acyclovir 400 MG tablet  Commonly known as: ZOVIRAX  TAKE 1 TABLET BY MOUTH TWICE A DAY     albuterol 1.25 mg/3 mL Nebu  Commonly known as: ACCUNEB  Take 3 mLs (1.25 mg total) by nebulization every 6 (six) hours as needed (wheeze/cough). Rescue     amLODIPine 5 MG tablet  Commonly known as: NORVASC  TAKE 1 TABLET BY MOUTH EVERY DAY     aspirin 81 MG EC tablet  Commonly known as: ECOTRIN  TAKE 1 TABLET BY MOUTH EVERY DAY     cholecalciferol (vitamin D3) 125 mcg (5,000 unit) Tab     cyanocobalamin (vitamin B-12) 5,000 mcg Subl  Place one under tongue once a day for 1 month, then continue to take under tongue per week     DARZALEX FASPRO subcutaneous injection  Generic drug: daratumumab-hyaluronidase-fij     ENGERIX-B (PF) 20 mcg/mL Syrg  Generic drug: hepatitis B virus vacc.rec(PF)     ferrous gluconate 324 mg (37.5 mg iron) Tab tablet     gabapentin 300 MG capsule  Commonly known as: NEURONTIN  Take 1 capsule (300 mg total) by mouth 2 (two) times daily.     GADAVIST 1 mmol/mL (604.72 mg/mL) Soln injection  Generic drug: gadobutroL     loperamide 2 mg capsule  Commonly known as: IMODIUM  TAKE 1 CAPSULE BY MOUTH 4 TIMES DAILY AS NEEDED FOR DIARRHEA.     metFORMIN 1000 MG tablet  Commonly known as: GLUCOPHAGE  Take 1 tablet (1,000 mg total) by mouth 2 (two) times daily with meals.     omeprazole 40 MG capsule  Commonly known as: PRILOSEC  TAKE 1 CAPSULE BY MOUTH EVERY DAY     OXYGEN-AIR DELIVERY SYSTEMS MISC     pomalidomide 2 mg Cap  Commonly known as: POMALYST  Take 2 mg by mouth Daily.     potassium chloride SA 20 MEQ tablet  Commonly known as: K-DUR,KLOR-CON     REPATHA SURECLICK 140 mg/mL Pnij  Generic drug: evolocumab  Inject 1 mL (140 mg total) into the skin every 14 (fourteen) days.     valsartan 160 MG tablet  Commonly known  as: DIOVAN  Take 1 tablet (160 mg total) by mouth once daily.     zoledronic 4 mg/100 mL Pgbk infusion     zoledronic acid 4 mg/5 mL injection  Commonly known as: ZOMETA              Signing Physician:  Emanuel Drummond MD    Billing:        -----------------------------------------------------------------------------    I performed this consultation using real-time Telehealth tools, including a live video connection between my location and the patient's location (their home within the [facility]). Prior to initiating the consultation, I obtained informed verbal consent to perform this consultation using Telehealth tools and answered all the questions about the Telehealth interaction. The participants understand that only a limited neurological exam and limited neuropsychological testing can be performed using Telehealth tools.    I spent a total of 45 minutes (time-in: 11:00 AM; time-out: 11:45 AM) on 2023-09-12, virtually face-to-face with the patient and caregiver(s), >50% of that time was spent counseling regarding the symptoms, treatment plan, risks, therapeutic options, lifestyle modifications, and/or safety issues for the diagnoses above.    10/14 Review of Systems completed and is negative except as stated above in HPI (Systems reviewed: Const, Eyes, ENT, Resp, CV, GI, , MSK, Skin, Neuro)    I reviewed previous labs for a total of 5 minutes on 2023-09-12. This is directly related to the face-to-face encounter. Review of previous labs was performed all negative except as stated above in HPI    I reviewed previous diagnostic testing for a total of 5 minutes on 2023-09-12. This is directly related to the face-to-face encounter. A review of previous diagnostic testing was performed was noted to be within normal limits except as is stated above in HPI    I performed a neurobehavioral status examination that included a clinical assessment of thinking, reasoning, and judgment. Please see above HPI and ROS  for full details. This exam was performed on 2023-09-12 and included 13 minutes spent on direct face-to-face clinical observation and interview with the patient and 21 minutes spent interpreting test results and preparing the report. The total time of 34 minutes spent on the neurobehavioral status examination is not included in the time spent on evaluation and management coding.    Total Billing time spent on encounter/documentation for this patient's evaluation and management, not including the neurobehavioral status examination: 42 minutes.

## 2023-09-14 ENCOUNTER — LAB VISIT (OUTPATIENT)
Dept: LAB | Facility: OTHER | Age: 73
End: 2023-09-14
Attending: INTERNAL MEDICINE
Payer: MEDICARE

## 2023-09-14 ENCOUNTER — OFFICE VISIT (OUTPATIENT)
Dept: OPTOMETRY | Facility: CLINIC | Age: 73
End: 2023-09-14
Payer: MEDICARE

## 2023-09-14 DIAGNOSIS — H25.13 NUCLEAR SCLEROSIS OF BOTH EYES: ICD-10-CM

## 2023-09-14 DIAGNOSIS — E11.9 TYPE 2 DIABETES MELLITUS WITHOUT OPHTHALMIC MANIFESTATIONS: Primary | ICD-10-CM

## 2023-09-14 DIAGNOSIS — C90.00 MULTIPLE MYELOMA, REMISSION STATUS UNSPECIFIED: ICD-10-CM

## 2023-09-14 LAB
ALBUMIN SERPL BCP-MCNC: 3.9 G/DL (ref 3.5–5.2)
ALP SERPL-CCNC: 49 U/L (ref 55–135)
ALT SERPL W/O P-5'-P-CCNC: 11 U/L (ref 10–44)
ANION GAP SERPL CALC-SCNC: 12 MMOL/L (ref 8–16)
AST SERPL-CCNC: 12 U/L (ref 10–40)
BASOPHILS # BLD AUTO: 0.06 K/UL (ref 0–0.2)
BASOPHILS NFR BLD: 1.9 % (ref 0–1.9)
BILIRUB SERPL-MCNC: 0.4 MG/DL (ref 0.1–1)
BUN SERPL-MCNC: 12 MG/DL (ref 8–23)
CALCIUM SERPL-MCNC: 9.7 MG/DL (ref 8.7–10.5)
CHLORIDE SERPL-SCNC: 104 MMOL/L (ref 95–110)
CO2 SERPL-SCNC: 27 MMOL/L (ref 23–29)
CREAT SERPL-MCNC: 0.8 MG/DL (ref 0.5–1.4)
DIFFERENTIAL METHOD: ABNORMAL
EOSINOPHIL # BLD AUTO: 0.2 K/UL (ref 0–0.5)
EOSINOPHIL NFR BLD: 5.3 % (ref 0–8)
ERYTHROCYTE [DISTWIDTH] IN BLOOD BY AUTOMATED COUNT: 20.3 % (ref 11.5–14.5)
EST. GFR  (NO RACE VARIABLE): >60 ML/MIN/1.73 M^2
GLUCOSE SERPL-MCNC: 99 MG/DL (ref 70–110)
HCT VFR BLD AUTO: 33.2 % (ref 37–48.5)
HGB BLD-MCNC: 10.1 G/DL (ref 12–16)
IGA SERPL-MCNC: 115 MG/DL (ref 40–350)
IGG SERPL-MCNC: 1005 MG/DL (ref 650–1600)
IGM SERPL-MCNC: 12 MG/DL (ref 50–300)
IMM GRANULOCYTES # BLD AUTO: 0.01 K/UL (ref 0–0.04)
IMM GRANULOCYTES NFR BLD AUTO: 0.3 % (ref 0–0.5)
LYMPHOCYTES # BLD AUTO: 0.8 K/UL (ref 1–4.8)
LYMPHOCYTES NFR BLD: 26 % (ref 18–48)
MCH RBC QN AUTO: 24.2 PG (ref 27–31)
MCHC RBC AUTO-ENTMCNC: 30.4 G/DL (ref 32–36)
MCV RBC AUTO: 79 FL (ref 82–98)
MONOCYTES # BLD AUTO: 0.5 K/UL (ref 0.3–1)
MONOCYTES NFR BLD: 16.6 % (ref 4–15)
NEUTROPHILS # BLD AUTO: 1.6 K/UL (ref 1.8–7.7)
NEUTROPHILS NFR BLD: 49.9 % (ref 38–73)
NRBC BLD-RTO: 0 /100 WBC
PLATELET # BLD AUTO: 181 K/UL (ref 150–450)
PMV BLD AUTO: 10.5 FL (ref 9.2–12.9)
POTASSIUM SERPL-SCNC: 3.9 MMOL/L (ref 3.5–5.1)
PROT SERPL-MCNC: 7.1 G/DL (ref 6–8.4)
RBC # BLD AUTO: 4.18 M/UL (ref 4–5.4)
SODIUM SERPL-SCNC: 143 MMOL/L (ref 136–145)
WBC # BLD AUTO: 3.19 K/UL (ref 3.9–12.7)

## 2023-09-14 PROCEDURE — 84165 PROTEIN E-PHORESIS SERUM: CPT | Mod: HCNC | Performed by: INTERNAL MEDICINE

## 2023-09-14 PROCEDURE — 1159F MED LIST DOCD IN RCRD: CPT | Mod: HCNC,CPTII,S$GLB, | Performed by: OPTOMETRIST

## 2023-09-14 PROCEDURE — 2023F DILAT RTA XM W/O RTNOPTHY: CPT | Mod: HCNC,CPTII,S$GLB, | Performed by: OPTOMETRIST

## 2023-09-14 PROCEDURE — 85025 COMPLETE CBC W/AUTO DIFF WBC: CPT | Mod: HCNC | Performed by: NURSE PRACTITIONER

## 2023-09-14 PROCEDURE — 3288F FALL RISK ASSESSMENT DOCD: CPT | Mod: HCNC,CPTII,S$GLB, | Performed by: OPTOMETRIST

## 2023-09-14 PROCEDURE — 1101F PT FALLS ASSESS-DOCD LE1/YR: CPT | Mod: HCNC,CPTII,S$GLB, | Performed by: OPTOMETRIST

## 2023-09-14 PROCEDURE — 84165 PATHOLOGIST INTERPRETATION SPE: ICD-10-PCS | Mod: 26,HCNC,, | Performed by: PATHOLOGY

## 2023-09-14 PROCEDURE — 99999 PR PBB SHADOW E&M-EST. PATIENT-LVL III: CPT | Mod: PBBFAC,HCNC,, | Performed by: OPTOMETRIST

## 2023-09-14 PROCEDURE — 4010F PR ACE/ARB THEARPY RXD/TAKEN: ICD-10-PCS | Mod: HCNC,CPTII,S$GLB, | Performed by: OPTOMETRIST

## 2023-09-14 PROCEDURE — 83521 IG LIGHT CHAINS FREE EACH: CPT | Mod: HCNC | Performed by: INTERNAL MEDICINE

## 2023-09-14 PROCEDURE — 84165 PROTEIN E-PHORESIS SERUM: CPT | Mod: 26,HCNC,, | Performed by: PATHOLOGY

## 2023-09-14 PROCEDURE — 36415 COLL VENOUS BLD VENIPUNCTURE: CPT | Mod: HCNC | Performed by: INTERNAL MEDICINE

## 2023-09-14 PROCEDURE — 82784 ASSAY IGA/IGD/IGG/IGM EACH: CPT | Mod: 59,HCNC | Performed by: INTERNAL MEDICINE

## 2023-09-14 PROCEDURE — 86334 IMMUNOFIX E-PHORESIS SERUM: CPT | Mod: HCNC | Performed by: INTERNAL MEDICINE

## 2023-09-14 PROCEDURE — 1126F PR PAIN SEVERITY QUANTIFIED, NO PAIN PRESENT: ICD-10-PCS | Mod: HCNC,CPTII,S$GLB, | Performed by: OPTOMETRIST

## 2023-09-14 PROCEDURE — 3044F HG A1C LEVEL LT 7.0%: CPT | Mod: HCNC,CPTII,S$GLB, | Performed by: OPTOMETRIST

## 2023-09-14 PROCEDURE — 80053 COMPREHEN METABOLIC PANEL: CPT | Mod: HCNC | Performed by: NURSE PRACTITIONER

## 2023-09-14 PROCEDURE — 3288F PR FALLS RISK ASSESSMENT DOCUMENTED: ICD-10-PCS | Mod: HCNC,CPTII,S$GLB, | Performed by: OPTOMETRIST

## 2023-09-14 PROCEDURE — 4010F ACE/ARB THERAPY RXD/TAKEN: CPT | Mod: HCNC,CPTII,S$GLB, | Performed by: OPTOMETRIST

## 2023-09-14 PROCEDURE — 86334 IMMUNOFIX E-PHORESIS SERUM: CPT | Mod: 26,HCNC,, | Performed by: PATHOLOGY

## 2023-09-14 PROCEDURE — 1101F PR PT FALLS ASSESS DOC 0-1 FALLS W/OUT INJ PAST YR: ICD-10-PCS | Mod: HCNC,CPTII,S$GLB, | Performed by: OPTOMETRIST

## 2023-09-14 PROCEDURE — 86334 PATHOLOGIST INTERPRETATION IFE: ICD-10-PCS | Mod: 26,HCNC,, | Performed by: PATHOLOGY

## 2023-09-14 PROCEDURE — 1126F AMNT PAIN NOTED NONE PRSNT: CPT | Mod: HCNC,CPTII,S$GLB, | Performed by: OPTOMETRIST

## 2023-09-14 PROCEDURE — 92014 COMPRE OPH EXAM EST PT 1/>: CPT | Mod: HCNC,S$GLB,, | Performed by: OPTOMETRIST

## 2023-09-14 PROCEDURE — 1159F PR MEDICATION LIST DOCUMENTED IN MEDICAL RECORD: ICD-10-PCS | Mod: HCNC,CPTII,S$GLB, | Performed by: OPTOMETRIST

## 2023-09-14 PROCEDURE — 3044F PR MOST RECENT HEMOGLOBIN A1C LEVEL <7.0%: ICD-10-PCS | Mod: HCNC,CPTII,S$GLB, | Performed by: OPTOMETRIST

## 2023-09-14 PROCEDURE — 99999 PR PBB SHADOW E&M-EST. PATIENT-LVL III: ICD-10-PCS | Mod: PBBFAC,HCNC,, | Performed by: OPTOMETRIST

## 2023-09-14 PROCEDURE — 92014 PR EYE EXAM, EST PATIENT,COMPREHESV: ICD-10-PCS | Mod: HCNC,S$GLB,, | Performed by: OPTOMETRIST

## 2023-09-14 PROCEDURE — 2023F PR DILATED RETINAL EXAM W/O EVID OF RETINOPATHY: ICD-10-PCS | Mod: HCNC,CPTII,S$GLB, | Performed by: OPTOMETRIST

## 2023-09-14 NOTE — PROGRESS NOTES
HPI    Annual Diabetic Eye Exam   Pt states Blurred Vision c Spec MRX   Pt reports after 4 months Spec Mrx was no longer helping     Pt reports floaters larger/ higher frequency     Pt denies Dry/ Itchy/ Burning  Gtt: No    DM Dx'ed   BS: Unknown   Hemoglobin A1C       Date                     Value               Ref Range             Status                04/21/2023               5.3                 4.0 - 5.6 %           Final                Last edited by Tom Mead, OD on 9/14/2023 10:34 AM.            Assessment /Plan     For exam results, see Encounter Report.    Type 2 diabetes mellitus without ophthalmic manifestations  -No retinopathy noted today.  Continued control with primary care physician and annual comprehensive eye exam.    Nuclear sclerosis of both eyes  -Mature, pt with multiple health concerns and wishes to wait on elective surgery       RTC 1 yr

## 2023-09-15 ENCOUNTER — INFUSION (OUTPATIENT)
Dept: INFUSION THERAPY | Facility: HOSPITAL | Age: 73
End: 2023-09-15
Payer: MEDICARE

## 2023-09-15 ENCOUNTER — OFFICE VISIT (OUTPATIENT)
Dept: HEMATOLOGY/ONCOLOGY | Facility: CLINIC | Age: 73
End: 2023-09-15
Payer: MEDICARE

## 2023-09-15 VITALS
HEIGHT: 62 IN | OXYGEN SATURATION: 97 % | BODY MASS INDEX: 26.27 KG/M2 | RESPIRATION RATE: 18 BRPM | DIASTOLIC BLOOD PRESSURE: 67 MMHG | SYSTOLIC BLOOD PRESSURE: 139 MMHG | WEIGHT: 142.75 LBS | HEART RATE: 65 BPM

## 2023-09-15 VITALS
HEIGHT: 62 IN | WEIGHT: 142.75 LBS | RESPIRATION RATE: 18 BRPM | HEART RATE: 118 BPM | TEMPERATURE: 98 F | DIASTOLIC BLOOD PRESSURE: 73 MMHG | SYSTOLIC BLOOD PRESSURE: 117 MMHG | OXYGEN SATURATION: 95 % | BODY MASS INDEX: 26.27 KG/M2

## 2023-09-15 DIAGNOSIS — D50.0 IRON DEFICIENCY ANEMIA DUE TO CHRONIC BLOOD LOSS: Primary | ICD-10-CM

## 2023-09-15 DIAGNOSIS — F03.90 DEMENTIA, UNSPECIFIED DEMENTIA SEVERITY, UNSPECIFIED DEMENTIA TYPE, UNSPECIFIED WHETHER BEHAVIORAL, PSYCHOTIC, OR MOOD DISTURBANCE OR ANXIETY: ICD-10-CM

## 2023-09-15 DIAGNOSIS — C90.02 MULTIPLE MYELOMA IN RELAPSE: ICD-10-CM

## 2023-09-15 DIAGNOSIS — Z94.81 STATUS POST AUTOLOGOUS BONE MARROW TRANSPLANT: ICD-10-CM

## 2023-09-15 DIAGNOSIS — C90.00 MULTIPLE MYELOMA, REMISSION STATUS UNSPECIFIED: ICD-10-CM

## 2023-09-15 DIAGNOSIS — D50.9 IRON DEFICIENCY ANEMIA, UNSPECIFIED IRON DEFICIENCY ANEMIA TYPE: ICD-10-CM

## 2023-09-15 DIAGNOSIS — Z79.899 IMMUNODEFICIENCY DUE TO DRUGS: ICD-10-CM

## 2023-09-15 DIAGNOSIS — Z94.84 HISTORY OF AUTO STEM CELL TRANSPLANT: ICD-10-CM

## 2023-09-15 DIAGNOSIS — D84.821 IMMUNODEFICIENCY DUE TO DRUGS: ICD-10-CM

## 2023-09-15 DIAGNOSIS — C90.02 MULTIPLE MYELOMA IN RELAPSE: Primary | ICD-10-CM

## 2023-09-15 LAB
ALBUMIN SERPL ELPH-MCNC: 3.83 G/DL (ref 3.35–5.55)
ALPHA1 GLOB SERPL ELPH-MCNC: 0.28 G/DL (ref 0.17–0.41)
ALPHA2 GLOB SERPL ELPH-MCNC: 0.75 G/DL (ref 0.43–0.99)
B-GLOBULIN SERPL ELPH-MCNC: 0.75 G/DL (ref 0.5–1.1)
GAMMA GLOB SERPL ELPH-MCNC: 0.9 G/DL (ref 0.67–1.58)
INTERPRETATION SERPL IFE-IMP: NORMAL
KAPPA LC SER QL IA: 11.05 MG/DL (ref 0.33–1.94)
KAPPA LC/LAMBDA SER IA: 5.88 (ref 0.26–1.65)
LAMBDA LC SER QL IA: 1.88 MG/DL (ref 0.57–2.63)
PROT SERPL-MCNC: 6.5 G/DL (ref 6–8.4)

## 2023-09-15 PROCEDURE — 1160F RVW MEDS BY RX/DR IN RCRD: CPT | Mod: HCNC,CPTII,S$GLB, | Performed by: PHYSICIAN ASSISTANT

## 2023-09-15 PROCEDURE — 1101F PR PT FALLS ASSESS DOC 0-1 FALLS W/OUT INJ PAST YR: ICD-10-PCS | Mod: HCNC,CPTII,S$GLB, | Performed by: PHYSICIAN ASSISTANT

## 2023-09-15 PROCEDURE — 63600175 PHARM REV CODE 636 W HCPCS: Mod: HCNC | Performed by: PHYSICIAN ASSISTANT

## 2023-09-15 PROCEDURE — 99999 PR PBB SHADOW E&M-EST. PATIENT-LVL IV: CPT | Mod: PBBFAC,HCNC,, | Performed by: PHYSICIAN ASSISTANT

## 2023-09-15 PROCEDURE — 1126F PR PAIN SEVERITY QUANTIFIED, NO PAIN PRESENT: ICD-10-PCS | Mod: HCNC,CPTII,S$GLB, | Performed by: PHYSICIAN ASSISTANT

## 2023-09-15 PROCEDURE — 3008F BODY MASS INDEX DOCD: CPT | Mod: HCNC,CPTII,S$GLB, | Performed by: PHYSICIAN ASSISTANT

## 2023-09-15 PROCEDURE — 3078F DIAST BP <80 MM HG: CPT | Mod: HCNC,CPTII,S$GLB, | Performed by: PHYSICIAN ASSISTANT

## 2023-09-15 PROCEDURE — 96365 THER/PROPH/DIAG IV INF INIT: CPT

## 2023-09-15 PROCEDURE — 25000003 PHARM REV CODE 250: Mod: HCNC | Performed by: NURSE PRACTITIONER

## 2023-09-15 PROCEDURE — 96401 CHEMO ANTI-NEOPL SQ/IM: CPT | Mod: HCNC

## 2023-09-15 PROCEDURE — 96375 TX/PRO/DX INJ NEW DRUG ADDON: CPT | Mod: HCNC

## 2023-09-15 PROCEDURE — 1159F MED LIST DOCD IN RCRD: CPT | Mod: HCNC,CPTII,S$GLB, | Performed by: PHYSICIAN ASSISTANT

## 2023-09-15 PROCEDURE — 3074F SYST BP LT 130 MM HG: CPT | Mod: HCNC,CPTII,S$GLB, | Performed by: PHYSICIAN ASSISTANT

## 2023-09-15 PROCEDURE — 1126F AMNT PAIN NOTED NONE PRSNT: CPT | Mod: HCNC,CPTII,S$GLB, | Performed by: PHYSICIAN ASSISTANT

## 2023-09-15 PROCEDURE — 4010F ACE/ARB THERAPY RXD/TAKEN: CPT | Mod: HCNC,CPTII,S$GLB, | Performed by: PHYSICIAN ASSISTANT

## 2023-09-15 PROCEDURE — 99215 OFFICE O/P EST HI 40 MIN: CPT | Mod: HCNC,S$GLB,, | Performed by: PHYSICIAN ASSISTANT

## 2023-09-15 PROCEDURE — 3288F FALL RISK ASSESSMENT DOCD: CPT | Mod: HCNC,CPTII,S$GLB, | Performed by: PHYSICIAN ASSISTANT

## 2023-09-15 PROCEDURE — 3288F PR FALLS RISK ASSESSMENT DOCUMENTED: ICD-10-PCS | Mod: HCNC,CPTII,S$GLB, | Performed by: PHYSICIAN ASSISTANT

## 2023-09-15 PROCEDURE — 1101F PT FALLS ASSESS-DOCD LE1/YR: CPT | Mod: HCNC,CPTII,S$GLB, | Performed by: PHYSICIAN ASSISTANT

## 2023-09-15 PROCEDURE — 3044F PR MOST RECENT HEMOGLOBIN A1C LEVEL <7.0%: ICD-10-PCS | Mod: HCNC,CPTII,S$GLB, | Performed by: PHYSICIAN ASSISTANT

## 2023-09-15 PROCEDURE — 3008F PR BODY MASS INDEX (BMI) DOCUMENTED: ICD-10-PCS | Mod: HCNC,CPTII,S$GLB, | Performed by: PHYSICIAN ASSISTANT

## 2023-09-15 PROCEDURE — 4010F PR ACE/ARB THEARPY RXD/TAKEN: ICD-10-PCS | Mod: HCNC,CPTII,S$GLB, | Performed by: PHYSICIAN ASSISTANT

## 2023-09-15 PROCEDURE — 3078F PR MOST RECENT DIASTOLIC BLOOD PRESSURE < 80 MM HG: ICD-10-PCS | Mod: HCNC,CPTII,S$GLB, | Performed by: PHYSICIAN ASSISTANT

## 2023-09-15 PROCEDURE — 3074F PR MOST RECENT SYSTOLIC BLOOD PRESSURE < 130 MM HG: ICD-10-PCS | Mod: HCNC,CPTII,S$GLB, | Performed by: PHYSICIAN ASSISTANT

## 2023-09-15 PROCEDURE — 3044F HG A1C LEVEL LT 7.0%: CPT | Mod: HCNC,CPTII,S$GLB, | Performed by: PHYSICIAN ASSISTANT

## 2023-09-15 PROCEDURE — 1160F PR REVIEW ALL MEDS BY PRESCRIBER/CLIN PHARMACIST DOCUMENTED: ICD-10-PCS | Mod: HCNC,CPTII,S$GLB, | Performed by: PHYSICIAN ASSISTANT

## 2023-09-15 PROCEDURE — 96367 TX/PROPH/DG ADDL SEQ IV INF: CPT | Mod: HCNC

## 2023-09-15 PROCEDURE — 99999 PR PBB SHADOW E&M-EST. PATIENT-LVL IV: ICD-10-PCS | Mod: PBBFAC,HCNC,, | Performed by: PHYSICIAN ASSISTANT

## 2023-09-15 PROCEDURE — 99215 PR OFFICE/OUTPT VISIT, EST, LEVL V, 40-54 MIN: ICD-10-PCS | Mod: HCNC,S$GLB,, | Performed by: PHYSICIAN ASSISTANT

## 2023-09-15 PROCEDURE — 1159F PR MEDICATION LIST DOCUMENTED IN MEDICAL RECORD: ICD-10-PCS | Mod: HCNC,CPTII,S$GLB, | Performed by: PHYSICIAN ASSISTANT

## 2023-09-15 RX ORDER — HEPARIN 100 UNIT/ML
500 SYRINGE INTRAVENOUS
Status: CANCELLED | OUTPATIENT
Start: 2023-09-15

## 2023-09-15 RX ORDER — HEPARIN 100 UNIT/ML
500 SYRINGE INTRAVENOUS
Status: DISCONTINUED | OUTPATIENT
Start: 2023-09-15 | End: 2023-09-15 | Stop reason: HOSPADM

## 2023-09-15 RX ORDER — HEPARIN 100 UNIT/ML
500 SYRINGE INTRAVENOUS
Status: CANCELLED | OUTPATIENT
Start: 2023-09-22

## 2023-09-15 RX ORDER — ZOLEDRONIC ACID 0.04 MG/ML
4 INJECTION, SOLUTION INTRAVENOUS
Status: COMPLETED | OUTPATIENT
Start: 2023-09-15 | End: 2023-09-15

## 2023-09-15 RX ORDER — SODIUM CHLORIDE 0.9 % (FLUSH) 0.9 %
10 SYRINGE (ML) INJECTION
Status: CANCELLED | OUTPATIENT
Start: 2023-09-15

## 2023-09-15 RX ORDER — ZOLEDRONIC ACID 0.04 MG/ML
4 INJECTION, SOLUTION INTRAVENOUS
Status: CANCELLED | OUTPATIENT
Start: 2023-09-15

## 2023-09-15 RX ORDER — EPINEPHRINE 0.3 MG/.3ML
0.3 INJECTION SUBCUTANEOUS ONCE AS NEEDED
Status: DISCONTINUED | OUTPATIENT
Start: 2023-09-15 | End: 2023-09-15 | Stop reason: HOSPADM

## 2023-09-15 RX ORDER — SODIUM CHLORIDE 0.9 % (FLUSH) 0.9 %
10 SYRINGE (ML) INJECTION
Status: DISCONTINUED | OUTPATIENT
Start: 2023-09-15 | End: 2023-09-15 | Stop reason: HOSPADM

## 2023-09-15 RX ORDER — DIPHENHYDRAMINE HYDROCHLORIDE 50 MG/ML
50 INJECTION INTRAMUSCULAR; INTRAVENOUS ONCE AS NEEDED
Status: CANCELLED | OUTPATIENT
Start: 2023-09-15 | End: 2035-02-10

## 2023-09-15 RX ORDER — SODIUM CHLORIDE 0.9 % (FLUSH) 0.9 %
10 SYRINGE (ML) INJECTION
Status: CANCELLED | OUTPATIENT
Start: 2023-09-22

## 2023-09-15 RX ORDER — DIPHENHYDRAMINE HYDROCHLORIDE 50 MG/ML
50 INJECTION INTRAMUSCULAR; INTRAVENOUS ONCE AS NEEDED
Status: DISCONTINUED | OUTPATIENT
Start: 2023-09-15 | End: 2023-09-15 | Stop reason: HOSPADM

## 2023-09-15 RX ORDER — EPINEPHRINE 0.3 MG/.3ML
0.3 INJECTION SUBCUTANEOUS ONCE AS NEEDED
Status: CANCELLED | OUTPATIENT
Start: 2023-09-15 | End: 2035-02-10

## 2023-09-15 RX ORDER — SERTRALINE HYDROCHLORIDE 50 MG/1
50 TABLET, FILM COATED ORAL
COMMUNITY
Start: 2023-07-21 | End: 2023-10-19

## 2023-09-15 RX ADMIN — DARATUMUMAB AND HYALURONIDASE-FIHJ (HUMAN RECOMBINANT) 1800 MG: 1800; 30000 INJECTION SUBCUTANEOUS at 09:09

## 2023-09-15 RX ADMIN — SODIUM CHLORIDE: 9 INJECTION, SOLUTION INTRAVENOUS at 09:09

## 2023-09-15 RX ADMIN — IRON SUCROSE 200 MG: 20 INJECTION, SOLUTION INTRAVENOUS at 09:09

## 2023-09-15 RX ADMIN — ZOLEDRONIC ACID 4 MG: 0.04 INJECTION, SOLUTION INTRAVENOUS at 09:09

## 2023-09-15 NOTE — PROGRESS NOTES
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  She achieved and tolerated well VRd x completing 7, 28 day cycles and achieving deep VGPR to induction. Then underwent Melphalan autologous stem cell transplant on 2/12/2020.      Extensive PMH as below.    2 prior CVAs (one in 2008, one in 2011)  L breast cancer DCIS stage 0 (s/p lumpectomy and radiation, no endocrine tx due to CVA)  HTN  DM II  Neuropathy, b/l hands  Prior smoker, quit in 2011  Hepatic lesions - vascular per recent MRI in 09 /2019 with no changes; will confirm no further rascon needed from help  Statin Intolerance - on praluent, PCSK9 inhibitor  HFpEF - EF 55%, diastolic dysfunction  Anxiety/Depression     ADMISSION SUMMARY: 2/10-2/26/2020  Admitted 2/10, tolerated chemo and transplant well. Engrafted on day +12. Remained afebrile throughout hospital stay and no mucositis. Experienced expected side effects of nausea and diarrhea controlled with antiemetics and Imodium. Discharged home with home PT/OT       Subjective:       Initial History: Ms. Thompson is a 73 y.o. female who presents for virtual follow up.  She is 3 yrs 7months post AutoSCT. Relapsed  at approximately  1 year post sct and was on Sravanthi/zhanna/dex until summer 2022 when changed to sravanthi/pom/dex due to biochemical only progression. Dex stopped due to mood issues.  Remains on sravanthi/pom/dex salvage. Tolerating dose adjusted pomalyst. Continues IV iron with treatment.    Generally feeling well with no complaints, dementia with hallucinations persist.     Past Medical History:   Past Medical History:   Diagnosis Date    Acute  respiratory failure with hypoxia 4/30/2019    FUENTES (acute kidney injury) 5/1/2019    Allergy     Anemia     Aortic aneurysm     Breast cancer 10/2011    left breast Stage 0 DCIS    Cataract     Chronic diastolic congestive heart failure 11/6/2015    Colon polyp     Community acquired pneumonia of right lower lobe of lung 8/3/2021    GERD (gastroesophageal reflux disease)     Hiatal hernia     History of colonic polyps     Hx of psychiatric care     Zoloft    HX: breast cancer     Hyperlipemia     Hypertension     ICH (intracerebral hemorrhage)     Immunocompromised 10/28/2022    Major depressive disorder, single episode, mild 6/23/2016    Nuclear sclerosis 7/21/2014    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 7/21/2014    PAD (peripheral artery disease) 11/6/2015    Psychiatric problem     Statin-induced rhabdomyolysis     4/2015     Stroke 4/2011    Type 2 diabetes mellitus with diabetic peripheral angiopathy without gangrene, with long-term current use of insulin 3/31/2021    Type 2 diabetes mellitus without complication, without long-term current use of insulin 2/10/2020    Walker as ambulation aid        Past Surgical HIstory:   Past Surgical History:   Procedure Laterality Date    APPENDECTOMY      BONE MARROW BIOPSY Left 10/3/2019    Procedure: Biopsy-bone marrow;  Surgeon: Jere iTneo MD;  Location: [facility] OR [address] [place] [address];  Service: Oncology;  Laterality: Left;    BREAST BIOPSY Left 10/2011    BREAST LUMPECTOMY Left 2011    DCIS    COLONOSCOPY      COLONOSCOPY N/A 1/9/2020    Procedure: COLONOSCOPY;  Surgeon: Quang De La Cruz MD;  Location: [facility] (4TH FLR);  Service: Endoscopy;  Laterality: N/A;    CYST REMOVAL      back    ESOPHAGOGASTRODUODENOSCOPY  07/2016    duodenal angioectasia    ESOPHAGOGASTRODUODENOSCOPY N/A 1/9/2020    Procedure: EGD (ESOPHAGOGASTRODUODENOSCOPY);  Surgeon: Quang De La Cruz MD;  Location: [facility] (4TH FLR);  Service: Endoscopy;  Laterality: N/A;     FOOT FRACTURE SURGERY  10/2012    right foot, with R hallux valgus repair    FRACTURE SURGERY Right     broken toe repiar    HEMORRHOID SURGERY      LIVER BIOPSY  04/2015    essentially normal, no fibrosis    TUBAL LIGATION      UPPER GASTROINTESTINAL ENDOSCOPY         Family History:   Family History   Problem Relation Age of Onset    Dementia Sister         x1 sister    Cancer Sister 63        Lung Cancer    No Known Problems Mother     Cancer Father     No Known Problems Brother     Hypertension Daughter     Fibroids Daughter         uterine    Hypertension Son     No Known Problems Brother     No Known Problems Maternal Aunt     No Known Problems Maternal Uncle     No Known Problems Paternal Aunt     No Known Problems Paternal Uncle     Hypertension Maternal Grandmother     No Known Problems Maternal Grandfather     No Known Problems Paternal Grandmother     No Known Problems Paternal Grandfather     Ovarian cancer Neg Hx     Colon cancer Neg Hx     Tremor Neg Hx     Amblyopia Neg Hx     Blindness Neg Hx     Cataracts Neg Hx     Diabetes Neg Hx     Glaucoma Neg Hx     Macular degeneration Neg Hx     Retinal detachment Neg Hx     Strabismus Neg Hx     Stroke Neg Hx     Thyroid disease Neg Hx     Esophageal cancer Neg Hx     Rectal cancer Neg Hx     Stomach cancer Neg Hx     Ulcerative colitis Neg Hx     Crohn's disease Neg Hx     Irritable bowel syndrome Neg Hx     Celiac disease Neg Hx        Social History:  reports that she quit smoking about 12 years ago. Her smoking use included cigarettes. She started smoking about 56 years ago. She has a 11.1 pack-year smoking history. She has never used smokeless tobacco. She reports that she does not currently use alcohol. She reports that she does not use drugs.    Allergies:  Review of patient's allergies indicates:   Allergen Reactions    Lipitor [atorvastatin] Itching     Itching, elevated cpk, muscle aches, statin induced myositis       Medications:  Current  Outpatient Medications   Medication Sig Dispense Refill    acyclovir (ZOVIRAX) 400 MG tablet TAKE 1 TABLET BY MOUTH TWICE A  tablet 3    albuterol (ACCUNEB) 1.25 mg/3 mL Nebu Take 3 mLs (1.25 mg total) by nebulization every 6 (six) hours as needed (wheeze/cough). Rescue 75 mL 2    amLODIPine (NORVASC) 5 MG tablet TAKE 1 TABLET BY MOUTH EVERY DAY 90 tablet 2    aspirin (ECOTRIN) 81 MG EC tablet TAKE 1 TABLET BY MOUTH EVERY DAY 90 tablet 3    b complex vitamins (B COMPLEX-VITAMIN B12) tablet Take 1 tablet by mouth once daily. 30 tablet 3    cholecalciferol, vitamin D3, 125 mcg (5,000 unit) Tab 1 tablet DAILY (route: oral)      cyanocobalamin, vitamin B-12, 5,000 mcg Subl Place one under tongue once a day for 1 month, then continue to take under tongue per week 30 tablet 3    DARZALEX FASPRO 1,800 mg-30,000 unit/15 mL Soln       ENGERIX-B, PF, 20 mcg/mL Syrg       evolocumab (REPATHA SURECLICK) 140 mg/mL PnIj Inject 1 mL (140 mg total) into the skin every 14 (fourteen) days. 6 mL 3    ferrous gluconate 324 mg (37.5 mg iron) Tab tablet 1 tablet DAILY (route: oral)      gabapentin (NEURONTIN) 300 MG capsule Take 1 capsule (300 mg total) by mouth 2 (two) times daily. 180 capsule 2    GADAVIST 1 mmol/mL (604.72 mg/mL) Soln injection       loperamide (IMODIUM) 2 mg capsule TAKE 1 CAPSULE BY MOUTH 4 TIMES DAILY AS NEEDED FOR DIARRHEA. 60 capsule 0    metFORMIN (GLUCOPHAGE) 1000 MG tablet Take 1 tablet (1,000 mg total) by mouth 2 (two) times daily with meals. 180 tablet 3    omeprazole (PRILOSEC) 40 MG capsule TAKE 1 CAPSULE BY MOUTH EVERY DAY 90 capsule 3    OXYGEN-AIR DELIVERY SYSTEMS MISC 1-2 Liter O2 - CONTINUOUS (route: Oxygen)      pomalidomide (POMALYST) 2 mg Cap Take 2 mg by mouth Daily. 21 capsule 0    potassium chloride SA (K-DUR,KLOR-CON) 20 MEQ tablet       sertraline (ZOLOFT) 50 MG tablet Take 50 mg by mouth.      suvorexant (BELSOMRA) 10 mg Tab Take 1 tablet by mouth every evening. 30 tablet 3     valsartan (DIOVAN) 160 MG tablet Take 1 tablet (160 mg total) by mouth once daily. 90 tablet 3    zoledronic 4 mg/100 mL PgBk infusion       zoledronic acid (ZOMETA) 4 mg/5 mL injection        No current facility-administered medications for this visit.      See HPI     ECOG Performance Status: 2 (due to remote  CVA)   Objective:      Vitals:   Vitals:    09/15/23 0749   BP: 117/73   Pulse: (!) 118   Resp: 18   Temp: 98.2 °F (36.8 °C)     General - somewhat frail appearing, no apparent distress  HEENT - oropharynx clear  Chest and Lung - clear to auscultation bilaterally   Cardiovascular - RRR with no MGR, normal S1 and S2  Abdomen-  soft, nontender, no palpable hepatomegaly or splenomegaly  Lymph - no palpable lymphadenopathy  Heme - no bruising, petechiae, pallor  Skin - no rashes or lesions  Psych - appropriate mood and affect       Laboratory Data:  Lab Results   Component Value Date    WBC 3.19 (L) 09/14/2023    HGB 10.1 (L) 09/14/2023    HCT 33.2 (L) 09/14/2023    MCV 79 (L) 09/14/2023     09/14/2023       Gran # (ANC)   Date Value Ref Range Status   09/14/2023 1.6 (L) 1.8 - 7.7 K/uL Final     Gran %   Date Value Ref Range Status   09/14/2023 49.9 38.0 - 73.0 % Final     CMP  Sodium   Date Value Ref Range Status   09/14/2023 143 136 - 145 mmol/L Final     Potassium   Date Value Ref Range Status   09/14/2023 3.9 3.5 - 5.1 mmol/L Final     Chloride   Date Value Ref Range Status   09/14/2023 104 95 - 110 mmol/L Final     CO2   Date Value Ref Range Status   09/14/2023 27 23 - 29 mmol/L Final     Glucose   Date Value Ref Range Status   09/14/2023 99 70 - 110 mg/dL Final     BUN   Date Value Ref Range Status   09/14/2023 12 8 - 23 mg/dL Final     Creatinine   Date Value Ref Range Status   09/14/2023 0.8 0.5 - 1.4 mg/dL Final     Calcium   Date Value Ref Range Status   09/14/2023 9.7 8.7 - 10.5 mg/dL Final     Total Protein   Date Value Ref Range Status   09/14/2023 7.1 6.0 - 8.4 g/dL Final     Albumin   Date  Value Ref Range Status   09/14/2023 3.9 3.5 - 5.2 g/dL Final     Total Bilirubin   Date Value Ref Range Status   09/14/2023 0.4 0.1 - 1.0 mg/dL Final     Comment:     For infants and newborns, interpretation of results should be based  on gestational age, weight and in agreement with clinical  observations.    Premature Infant recommended reference ranges:  Up to 24 hours.............<8.0 mg/dL  Up to 48 hours............<12.0 mg/dL  3-5 days..................<15.0 mg/dL  6-29 days.................<15.0 mg/dL       Alkaline Phosphatase   Date Value Ref Range Status   09/14/2023 49 (L) 55 - 135 U/L Final     AST   Date Value Ref Range Status   09/14/2023 12 10 - 40 U/L Final     ALT   Date Value Ref Range Status   09/14/2023 11 10 - 44 U/L Final     Anion Gap   Date Value Ref Range Status   09/14/2023 12 8 - 16 mmol/L Final     eGFR   Date Value Ref Range Status   09/14/2023 >60 >60 mL/min/1.73 m^2 Final     Kappa Free Light Chains   Date Value Ref Range Status   08/11/2023 10.36 (H) 0.33 - 1.94 mg/dL Final   07/14/2023 10.66 (H) 0.33 - 1.94 mg/dL Final   06/16/2023 10.93 (H) 0.33 - 1.94 mg/dL Final     Lambda Free Light Chains   Date Value Ref Range Status   08/11/2023 1.55 0.57 - 2.63 mg/dL Final   07/14/2023 2.34 0.57 - 2.63 mg/dL Final   06/16/2023 1.44 0.57 - 2.63 mg/dL Final     Kappa/Lambda FLC Ratio   Date Value Ref Range Status   08/11/2023 6.68 (H) 0.26 - 1.65 Final     Comment:     Undetected antigen excess is a rare event but cannot   be excluded. If these free light chain results do not   agree with other clinical or laboratory findings or   if the sample is from a patient that has previously   demonstrated antigen excess, discuss with the testing   laboratory.   Results should always be interpreted in conjunction   with other laboratory tests and clinical evidence.     07/14/2023 4.56 (H) 0.26 - 1.65 Final     Comment:     Undetected antigen excess is a rare event but cannot   be excluded. If these free  light chain results do not   agree with other clinical or laboratory findings or   if the sample is from a patient that has previously   demonstrated antigen excess, discuss with the testing   laboratory.   Results should always be interpreted in conjunction   with other laboratory tests and clinical evidence.     06/16/2023 7.59 (H) 0.26 - 1.65 Final     Comment:     Undetected antigen excess is a rare event but cannot   be excluded. If these free light chain results do not   agree with other clinical or laboratory findings or   if the sample is from a patient that has previously   demonstrated antigen excess, discuss with the testing   laboratory.   Results should always be interpreted in conjunction   with other laboratory tests and clinical evidence.       IgG   Date Value Ref Range Status   09/14/2023 1005 650 - 1600 mg/dL Final     Comment:     IgG Cord Blood Reference Range: 650-1600 mg/dL.     IgA   Date Value Ref Range Status   09/14/2023 115 40 - 350 mg/dL Final     Comment:     IgA Cord Blood Reference Range: <5 mg/dL.     IgM   Date Value Ref Range Status   09/14/2023 12 (L) 50 - 300 mg/dL Final     Comment:     IgM Cord Blood Reference Range: <25 mg/dL.     Pathologist Interpretation SPE   Date Value Ref Range Status   08/11/2023 REVIEWED  Final     Comment:       Electronically reviewed and signed by:  Destiny Lopes M.D.  Signed on 08/15/23 at 13:16  Normal total protein.   Normal gamma globulins are decreased.   There is a paraprotein band in gamma = 0.44 g/dL, previously 0.29   g/dL.      07/14/2023 REVIEWED  Final     Comment:       Electronically reviewed and signed by:  Destiny Lopes M.D.  Signed on 07/18/23 at 15:33  Normal total protein. Normal gamma globulins are decreased. There is   a paraprotein band in gamma = 0.29 g/dL, previously 0.40 g/dL.      06/16/2023 REVIEWED  Final     Comment:       Electronically reviewed and signed by:  Steve Medrano MD  Signed on 06/19/23 at  15:58  Normal total protein.   Normal gamma globulins are decreased.   There is a paraprotein band in gamma = 0.40 g/dL, previously 0.31   g/dL.        Pathologist Interpretation NATALY   Date Value Ref Range Status   08/11/2023 REVIEWED  Final     Comment:       Electronically reviewed and signed by:  Destiny Lopes M.D.  Signed on 08/15/23 at 13:16  Two closely adjacent IgG kappa specific monoclonal proteins are   present in gamma.             Imaging:      Assessment:       Ms. Thompson is a  73 y.o. femal with relapsed multiple myeloma.     Plan:     MM s/p Auto SCT  - high risk MM with 17p deletion  - 3 year 7 months  s/p Auto SCT  - started maintenance q2w velcade 5/6/20, serial biochemical relapse  Noted and  given this and high risk CG , transitioned to Sravanthi/Noble (1mg/m2) /dex  Salvage July 2021. Due to uncontrolled hyperglycemia dex stopped after C4.    - she demonstrated mild biochemical progression over summer 2022 studies with no CRAB, in light of this and high risk CG  we transitioned her therapy from Sravanthi/Velcade to Sravanthi/pomalyst; intolerant to dex.   - biochemical studies from 7/14/23 cw with continued MA so plan to continue sravanthi/pom until intolerance or progression   - Pomalyst dose decreased to  to 2mg (5/19/23) due to persistent neutropenia  now improved   - supportive meds: continue ppx acyclovir, asa; resume maintenance zometa q 3 months    - recommended she take prn imodium for occasional loose stool with pomalyst   - post transplant vaccines completed     Anemia due to chemotherapy/JESSE  - Due to therapy and JESSE  - Continue venofer q month when comes for her sravanthi     Neuropathy  - Stable   - continues gabapentin and prn tramadol     HTN  - continue norvasc  - Patient to monitor BP closely    Dementia/anxiety  - contniue fu with neuro/psych;  defer medication mgmt to them     Type II DM  - Managed by PCP      FU:     -cbc, cmp, serum free light chains, quantitative immunoglobulins, serum  electropheresis, serum immunofixation and kenia injection and venofer infusion  in 4 weeks   -same labs, NP appt, kenia injection/zometa infusion/venofer infusion       BMT Chart Routing  Urgent    Follow up with physician . F/u with dr. luong/racquel in 2 months   Follow up with CHRISTY    Provider visit type    Infusion scheduling note    Injection scheduling note kenia injection/venofer in one month with labs (can schedule monthly thru end of year)   Labs CBC, CMP, immunofixation, immunoglobulins, SPEP and free light chains   Scheduling:  Preferred lab:  Lab interval:     Imaging    Pharmacy appointment    Other referrals              Gem Persaud PA-C  [facility]

## 2023-09-15 NOTE — PLAN OF CARE
1010  Patient completed Darzalex SQ injection to abdominal tissue, Zometa infusion, Venofer IVP  , tolerated all well.  PIV discontinued without issue. Patient ambulated off floor accompanied by friend.

## 2023-09-18 LAB — PATHOLOGIST INTERPRETATION SPE: NORMAL

## 2023-09-20 DIAGNOSIS — C90.00 MULTIPLE MYELOMA, REMISSION STATUS UNSPECIFIED: ICD-10-CM

## 2023-09-20 DIAGNOSIS — D75.9 DRUG-INDUCED CYTOPENIA: ICD-10-CM

## 2023-09-20 DIAGNOSIS — Z78.0 MENOPAUSE: ICD-10-CM

## 2023-09-20 DIAGNOSIS — T50.905A DRUG-INDUCED CYTOPENIA: ICD-10-CM

## 2023-09-20 LAB — PATHOLOGIST INTERPRETATION IFE: NORMAL

## 2023-09-20 NOTE — TELEPHONE ENCOUNTER
"----- Message from Nancy Haynes sent at 9/20/2023 11:48 AM CDT -----  Regarding: Refill  Contact: Brandyn Ahumada requesting a refill for the following meds pomalidomide (POMALYST) 2 mg Cap... Please call and adv @            Confirmed contact below:   Contact Name: Brandyn  Phone Number:[phone]               Additional Notes:  "Thank you for all that you do for our patients"                                           "

## 2023-09-21 RX ORDER — OMEPRAZOLE 40 MG/1
CAPSULE, DELAYED RELEASE ORAL
Qty: 90 CAPSULE | Refills: 1 | Status: SHIPPED | OUTPATIENT
Start: 2023-09-21 | End: 2024-03-20

## 2023-09-21 NOTE — TELEPHONE ENCOUNTER
Refill Routing Note     Refill Routing Note   Medication(s) are not appropriate for processing by [facility] for the following reason(s):      Drug-disease interaction    [facility] action(s):  Defer Care Due:  None identified     Medication Therapy Plan: Pharmacogenomic metabolizing warning      Appointments  past 12m or future 3m with PCP    Date Provider   Last Visit   4/18/2023 Emanuel Arevalo Jr., MD   Next Visit   10/17/2023 Emanuel Arevalo Jr., MD   ED visits in past 90 days: 0        Note composed:7:16 AM 09/21/2023

## 2023-09-21 NOTE — TELEPHONE ENCOUNTER
No care due was identified.  [facility] Embedded Care Due Messages. Reference number: [id number].   9/21/2023 12:31:47 AM CDT

## 2023-10-12 ENCOUNTER — OFFICE VISIT (OUTPATIENT)
Dept: HEMATOLOGY/ONCOLOGY | Facility: CLINIC | Age: 73
End: 2023-10-12
Payer: MEDICARE

## 2023-10-12 ENCOUNTER — INFUSION (OUTPATIENT)
Dept: INFUSION THERAPY | Facility: HOSPITAL | Age: 73
End: 2023-10-12
Payer: MEDICARE

## 2023-10-12 VITALS
TEMPERATURE: 99 F | DIASTOLIC BLOOD PRESSURE: 72 MMHG | HEIGHT: 62 IN | BODY MASS INDEX: 25.58 KG/M2 | SYSTOLIC BLOOD PRESSURE: 126 MMHG | WEIGHT: 139 LBS | HEART RATE: 68 BPM | RESPIRATION RATE: 18 BRPM

## 2023-10-12 VITALS
BODY MASS INDEX: 25.42 KG/M2 | SYSTOLIC BLOOD PRESSURE: 130 MMHG | WEIGHT: 139 LBS | DIASTOLIC BLOOD PRESSURE: 79 MMHG | TEMPERATURE: 98 F | RESPIRATION RATE: 15 BRPM | OXYGEN SATURATION: 93 % | HEART RATE: 62 BPM

## 2023-10-12 DIAGNOSIS — D75.9 DRUG-INDUCED CYTOPENIA: ICD-10-CM

## 2023-10-12 DIAGNOSIS — E11.9 TYPE 2 DIABETES MELLITUS WITHOUT COMPLICATION, WITHOUT LONG-TERM CURRENT USE OF INSULIN: ICD-10-CM

## 2023-10-12 DIAGNOSIS — D50.0 IRON DEFICIENCY ANEMIA DUE TO CHRONIC BLOOD LOSS: ICD-10-CM

## 2023-10-12 DIAGNOSIS — Z79.899 IMMUNODEFICIENCY DUE TO DRUGS: ICD-10-CM

## 2023-10-12 DIAGNOSIS — D84.821 IMMUNODEFICIENCY DUE TO DRUGS: ICD-10-CM

## 2023-10-12 DIAGNOSIS — C90.02 MULTIPLE MYELOMA IN RELAPSE: Primary | ICD-10-CM

## 2023-10-12 DIAGNOSIS — T50.905A DRUG-INDUCED CYTOPENIA: ICD-10-CM

## 2023-10-12 DIAGNOSIS — C90.00 MULTIPLE MYELOMA, REMISSION STATUS UNSPECIFIED: Primary | ICD-10-CM

## 2023-10-12 DIAGNOSIS — D50.9 IRON DEFICIENCY ANEMIA, UNSPECIFIED IRON DEFICIENCY ANEMIA TYPE: ICD-10-CM

## 2023-10-12 DIAGNOSIS — Z94.84 HISTORY OF AUTO STEM CELL TRANSPLANT: ICD-10-CM

## 2023-10-12 DIAGNOSIS — F03.90 DEMENTIA, UNSPECIFIED DEMENTIA SEVERITY, UNSPECIFIED DEMENTIA TYPE, UNSPECIFIED WHETHER BEHAVIORAL, PSYCHOTIC, OR MOOD DISTURBANCE OR ANXIETY: ICD-10-CM

## 2023-10-12 DIAGNOSIS — Z94.81 STATUS POST AUTOLOGOUS BONE MARROW TRANSPLANT: ICD-10-CM

## 2023-10-12 PROCEDURE — 96375 TX/PRO/DX INJ NEW DRUG ADDON: CPT | Mod: HCNC

## 2023-10-12 PROCEDURE — 3078F PR MOST RECENT DIASTOLIC BLOOD PRESSURE < 80 MM HG: ICD-10-PCS | Mod: HCNC,CPTII,S$GLB, | Performed by: NURSE PRACTITIONER

## 2023-10-12 PROCEDURE — 1101F PT FALLS ASSESS-DOCD LE1/YR: CPT | Mod: HCNC,CPTII,S$GLB, | Performed by: NURSE PRACTITIONER

## 2023-10-12 PROCEDURE — 4010F PR ACE/ARB THEARPY RXD/TAKEN: ICD-10-PCS | Mod: HCNC,CPTII,S$GLB, | Performed by: NURSE PRACTITIONER

## 2023-10-12 PROCEDURE — 3288F PR FALLS RISK ASSESSMENT DOCUMENTED: ICD-10-PCS | Mod: HCNC,CPTII,S$GLB, | Performed by: NURSE PRACTITIONER

## 2023-10-12 PROCEDURE — 3008F PR BODY MASS INDEX (BMI) DOCUMENTED: ICD-10-PCS | Mod: HCNC,CPTII,S$GLB, | Performed by: NURSE PRACTITIONER

## 2023-10-12 PROCEDURE — 3044F HG A1C LEVEL LT 7.0%: CPT | Mod: HCNC,CPTII,S$GLB, | Performed by: NURSE PRACTITIONER

## 2023-10-12 PROCEDURE — 1101F PR PT FALLS ASSESS DOC 0-1 FALLS W/OUT INJ PAST YR: ICD-10-PCS | Mod: HCNC,CPTII,S$GLB, | Performed by: NURSE PRACTITIONER

## 2023-10-12 PROCEDURE — 1159F MED LIST DOCD IN RCRD: CPT | Mod: HCNC,CPTII,S$GLB, | Performed by: NURSE PRACTITIONER

## 2023-10-12 PROCEDURE — 25000003 PHARM REV CODE 250: Mod: HCNC | Performed by: NURSE PRACTITIONER

## 2023-10-12 PROCEDURE — 99215 PR OFFICE/OUTPT VISIT, EST, LEVL V, 40-54 MIN: ICD-10-PCS | Mod: HCNC,S$GLB,, | Performed by: NURSE PRACTITIONER

## 2023-10-12 PROCEDURE — 3075F SYST BP GE 130 - 139MM HG: CPT | Mod: HCNC,CPTII,S$GLB, | Performed by: NURSE PRACTITIONER

## 2023-10-12 PROCEDURE — 1126F AMNT PAIN NOTED NONE PRSNT: CPT | Mod: HCNC,CPTII,S$GLB, | Performed by: NURSE PRACTITIONER

## 2023-10-12 PROCEDURE — 99999 PR PBB SHADOW E&M-EST. PATIENT-LVL III: CPT | Mod: PBBFAC,HCNC,, | Performed by: NURSE PRACTITIONER

## 2023-10-12 PROCEDURE — 99215 OFFICE O/P EST HI 40 MIN: CPT | Mod: HCNC,S$GLB,, | Performed by: NURSE PRACTITIONER

## 2023-10-12 PROCEDURE — 1159F PR MEDICATION LIST DOCUMENTED IN MEDICAL RECORD: ICD-10-PCS | Mod: HCNC,CPTII,S$GLB, | Performed by: NURSE PRACTITIONER

## 2023-10-12 PROCEDURE — 3008F BODY MASS INDEX DOCD: CPT | Mod: HCNC,CPTII,S$GLB, | Performed by: NURSE PRACTITIONER

## 2023-10-12 PROCEDURE — 3288F FALL RISK ASSESSMENT DOCD: CPT | Mod: HCNC,CPTII,S$GLB, | Performed by: NURSE PRACTITIONER

## 2023-10-12 PROCEDURE — 3044F PR MOST RECENT HEMOGLOBIN A1C LEVEL <7.0%: ICD-10-PCS | Mod: HCNC,CPTII,S$GLB, | Performed by: NURSE PRACTITIONER

## 2023-10-12 PROCEDURE — 63600175 PHARM REV CODE 636 W HCPCS: Mod: HCNC | Performed by: NURSE PRACTITIONER

## 2023-10-12 PROCEDURE — 1160F PR REVIEW ALL MEDS BY PRESCRIBER/CLIN PHARMACIST DOCUMENTED: ICD-10-PCS | Mod: HCNC,CPTII,S$GLB, | Performed by: NURSE PRACTITIONER

## 2023-10-12 PROCEDURE — 99999 PR PBB SHADOW E&M-EST. PATIENT-LVL III: ICD-10-PCS | Mod: PBBFAC,HCNC,, | Performed by: NURSE PRACTITIONER

## 2023-10-12 PROCEDURE — 4010F ACE/ARB THERAPY RXD/TAKEN: CPT | Mod: HCNC,CPTII,S$GLB, | Performed by: NURSE PRACTITIONER

## 2023-10-12 PROCEDURE — 96367 TX/PROPH/DG ADDL SEQ IV INF: CPT | Mod: HCNC

## 2023-10-12 PROCEDURE — 96401 CHEMO ANTI-NEOPL SQ/IM: CPT | Mod: HCNC

## 2023-10-12 PROCEDURE — 3075F PR MOST RECENT SYSTOLIC BLOOD PRESS GE 130-139MM HG: ICD-10-PCS | Mod: HCNC,CPTII,S$GLB, | Performed by: NURSE PRACTITIONER

## 2023-10-12 PROCEDURE — 96365 THER/PROPH/DIAG IV INF INIT: CPT

## 2023-10-12 PROCEDURE — 1160F RVW MEDS BY RX/DR IN RCRD: CPT | Mod: HCNC,CPTII,S$GLB, | Performed by: NURSE PRACTITIONER

## 2023-10-12 PROCEDURE — 1126F PR PAIN SEVERITY QUANTIFIED, NO PAIN PRESENT: ICD-10-PCS | Mod: HCNC,CPTII,S$GLB, | Performed by: NURSE PRACTITIONER

## 2023-10-12 PROCEDURE — 3078F DIAST BP <80 MM HG: CPT | Mod: HCNC,CPTII,S$GLB, | Performed by: NURSE PRACTITIONER

## 2023-10-12 RX ORDER — HEPARIN 100 UNIT/ML
500 SYRINGE INTRAVENOUS
Status: CANCELLED | OUTPATIENT
Start: 2023-10-19

## 2023-10-12 RX ORDER — HEPARIN 100 UNIT/ML
500 SYRINGE INTRAVENOUS
Status: DISCONTINUED | OUTPATIENT
Start: 2023-10-12 | End: 2023-10-12 | Stop reason: HOSPADM

## 2023-10-12 RX ORDER — SODIUM CHLORIDE 0.9 % (FLUSH) 0.9 %
10 SYRINGE (ML) INJECTION
Status: DISCONTINUED | OUTPATIENT
Start: 2023-10-12 | End: 2023-10-12 | Stop reason: HOSPADM

## 2023-10-12 RX ORDER — EPINEPHRINE 0.3 MG/.3ML
0.3 INJECTION SUBCUTANEOUS ONCE AS NEEDED
Status: CANCELLED | OUTPATIENT
Start: 2023-10-13

## 2023-10-12 RX ORDER — EPINEPHRINE 0.3 MG/.3ML
0.3 INJECTION SUBCUTANEOUS ONCE AS NEEDED
Status: DISCONTINUED | OUTPATIENT
Start: 2023-10-12 | End: 2023-10-12 | Stop reason: HOSPADM

## 2023-10-12 RX ORDER — HEPARIN 100 UNIT/ML
500 SYRINGE INTRAVENOUS
Status: CANCELLED | OUTPATIENT
Start: 2023-10-13

## 2023-10-12 RX ORDER — SODIUM CHLORIDE 0.9 % (FLUSH) 0.9 %
10 SYRINGE (ML) INJECTION
Status: CANCELLED | OUTPATIENT
Start: 2023-10-19

## 2023-10-12 RX ORDER — DIPHENHYDRAMINE HYDROCHLORIDE 50 MG/ML
50 INJECTION INTRAMUSCULAR; INTRAVENOUS ONCE AS NEEDED
Status: DISCONTINUED | OUTPATIENT
Start: 2023-10-12 | End: 2023-10-12 | Stop reason: HOSPADM

## 2023-10-12 RX ORDER — SODIUM CHLORIDE 0.9 % (FLUSH) 0.9 %
10 SYRINGE (ML) INJECTION
Status: CANCELLED | OUTPATIENT
Start: 2023-10-13

## 2023-10-12 RX ORDER — DIPHENHYDRAMINE HYDROCHLORIDE 50 MG/ML
50 INJECTION INTRAMUSCULAR; INTRAVENOUS ONCE AS NEEDED
Status: CANCELLED | OUTPATIENT
Start: 2023-10-13

## 2023-10-12 RX ADMIN — DARATUMUMAB AND HYALURONIDASE-FIHJ (HUMAN RECOMBINANT) 1800 MG: 1800; 30000 INJECTION SUBCUTANEOUS at 12:10

## 2023-10-12 RX ADMIN — ZOLEDRONIC ACID 3.5 MG: 4 INJECTION, SOLUTION, CONCENTRATE INTRAVENOUS at 12:10

## 2023-10-12 RX ADMIN — SODIUM CHLORIDE: 9 INJECTION, SOLUTION INTRAVENOUS at 12:10

## 2023-10-12 RX ADMIN — IRON SUCROSE 200 MG: 20 INJECTION, SOLUTION INTRAVENOUS at 12:10

## 2023-10-12 NOTE — PROGRESS NOTES
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  She achieved and tolerated well VRd x completing 7, 28 day cycles and achieving deep VGPR to induction. Then underwent Melphalan autologous stem cell transplant on 2/12/2020.      Extensive PMH as below.    2 prior CVAs (one in 2008, one in 2011)  L breast cancer DCIS stage 0 (s/p lumpectomy and radiation, no endocrine tx due to CVA)  HTN  DM II  Neuropathy, b/l hands  Prior smoker, quit in 2011  Hepatic lesions - vascular per recent MRI in 09 /2019 with no changes; will confirm no further rascon needed from help  Statin Intolerance - on praluent, PCSK9 inhibitor  HFpEF - EF 55%, diastolic dysfunction  Anxiety/Depression     ADMISSION SUMMARY: 2/10-2/26/2020  Admitted 2/10, tolerated chemo and transplant well. Engrafted on day +12. Remained afebrile throughout hospital stay and no mucositis. Experienced expected side effects of nausea and diarrhea controlled with antiemetics and Imodium. Discharged home with home PT/OT       Subjective:       Initial History: Ms. Thompson is a 73 y.o. female who presents for virtual follow up.  She is 3 yrs 8months post AutoSCT. Relapsed  at approximately  1 year post sct and was on Sravanthi/zhanna/dex until summer 2022 when changed to sravanthi/pom/dex due to biochemical only progression. Dex stopped due to mood issues.  Remains on sravanthi/pom/dex salvage. Tolerating dose adjusted pomalyst. Continues IV iron with treatment. No active complaints today.     Generally feeling well with no complaints, dementia with hallucinations persist.     Past Medical History:   Past Medical History:    Diagnosis Date    Acute respiratory failure with hypoxia 4/30/2019    FUENTES (acute kidney injury) 5/1/2019    Allergy     Anemia     Aortic aneurysm     Breast cancer 10/2011    left breast Stage 0 DCIS    Cataract     Chronic diastolic congestive heart failure 11/6/2015    Colon polyp     Community acquired pneumonia of right lower lobe of lung 8/3/2021    GERD (gastroesophageal reflux disease)     Hiatal hernia     History of colonic polyps     Hx of psychiatric care     Zoloft    HX: breast cancer     Hyperlipemia     Hypertension     ICH (intracerebral hemorrhage)     Immunocompromised 10/28/2022    Major depressive disorder, single episode, mild 6/23/2016    Nuclear sclerosis 7/21/2014    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 7/21/2014    PAD (peripheral artery disease) 11/6/2015    Psychiatric problem     Statin-induced rhabdomyolysis     4/2015     Stroke 4/2011    Type 2 diabetes mellitus with diabetic peripheral angiopathy without gangrene, with long-term current use of insulin 3/31/2021    Type 2 diabetes mellitus without complication, without long-term current use of insulin 2/10/2020    Walker as ambulation aid        Past Surgical HIstory:   Past Surgical History:   Procedure Laterality Date    APPENDECTOMY      BONE MARROW BIOPSY Left 10/3/2019    Procedure: Biopsy-bone marrow;  Surgeon: Jere Tineo MD;  Location: [facility] OR [address] [place] [address];  Service: Oncology;  Laterality: Left;    BREAST BIOPSY Left 10/2011    BREAST LUMPECTOMY Left 2011    DCIS    COLONOSCOPY      COLONOSCOPY N/A 1/9/2020    Procedure: COLONOSCOPY;  Surgeon: Quang De La Cruz MD;  Location: [facility] (4TH FLR);  Service: Endoscopy;  Laterality: N/A;    CYST REMOVAL      back    ESOPHAGOGASTRODUODENOSCOPY  07/2016    duodenal angioectasia    ESOPHAGOGASTRODUODENOSCOPY N/A 1/9/2020    Procedure: EGD (ESOPHAGOGASTRODUODENOSCOPY);  Surgeon: Quang De La Cruz MD;  Location: [facility] ENDO (4TH FLR);  Service:  Endoscopy;  Laterality: N/A;    FOOT FRACTURE SURGERY  10/2012    right foot, with R hallux valgus repair    FRACTURE SURGERY Right     broken toe repiar    HEMORRHOID SURGERY      LIVER BIOPSY  04/2015    essentially normal, no fibrosis    TUBAL LIGATION      UPPER GASTROINTESTINAL ENDOSCOPY         Family History:   Family History   Problem Relation Age of Onset    Dementia Sister         x1 sister    Cancer Sister 63        Lung Cancer    No Known Problems Mother     Cancer Father     No Known Problems Brother     Hypertension Daughter     Fibroids Daughter         uterine    Hypertension Son     No Known Problems Brother     No Known Problems Maternal Aunt     No Known Problems Maternal Uncle     No Known Problems Paternal Aunt     No Known Problems Paternal Uncle     Hypertension Maternal Grandmother     No Known Problems Maternal Grandfather     No Known Problems Paternal Grandmother     No Known Problems Paternal Grandfather     Ovarian cancer Neg Hx     Colon cancer Neg Hx     Tremor Neg Hx     Amblyopia Neg Hx     Blindness Neg Hx     Cataracts Neg Hx     Diabetes Neg Hx     Glaucoma Neg Hx     Macular degeneration Neg Hx     Retinal detachment Neg Hx     Strabismus Neg Hx     Stroke Neg Hx     Thyroid disease Neg Hx     Esophageal cancer Neg Hx     Rectal cancer Neg Hx     Stomach cancer Neg Hx     Ulcerative colitis Neg Hx     Crohn's disease Neg Hx     Irritable bowel syndrome Neg Hx     Celiac disease Neg Hx        Social History:  reports that she quit smoking about 12 years ago. Her smoking use included cigarettes. She started smoking about 56 years ago. She has a 11.1 pack-year smoking history. She has never used smokeless tobacco. She reports that she does not currently use alcohol. She reports that she does not use drugs.    Allergies:  Review of patient's allergies indicates:   Allergen Reactions    Lipitor [atorvastatin] Itching     Itching, elevated cpk, muscle aches, statin induced myositis        Medications:  Current Outpatient Medications   Medication Sig Dispense Refill    acyclovir (ZOVIRAX) 400 MG tablet TAKE 1 TABLET BY MOUTH TWICE A  tablet 3    albuterol (ACCUNEB) 1.25 mg/3 mL Nebu Take 3 mLs (1.25 mg total) by nebulization every 6 (six) hours as needed (wheeze/cough). Rescue 75 mL 2    amLODIPine (NORVASC) 5 MG tablet TAKE 1 TABLET BY MOUTH EVERY DAY 90 tablet 2    aspirin (ECOTRIN) 81 MG EC tablet TAKE 1 TABLET BY MOUTH EVERY DAY 90 tablet 3    b complex vitamins (B COMPLEX-VITAMIN B12) tablet Take 1 tablet by mouth once daily. 30 tablet 3    cholecalciferol, vitamin D3, 125 mcg (5,000 unit) Tab 1 tablet DAILY (route: oral)      cyanocobalamin, vitamin B-12, 5,000 mcg Subl Place one under tongue once a day for 1 month, then continue to take under tongue per week 30 tablet 3    DARZALEX FASPRO 1,800 mg-30,000 unit/15 mL Soln       ENGERIX-B, PF, 20 mcg/mL Syrg       evolocumab (REPATHA SURECLICK) 140 mg/mL PnIj Inject 1 mL (140 mg total) into the skin every 14 (fourteen) days. 6 mL 3    ferrous gluconate 324 mg (37.5 mg iron) Tab tablet 1 tablet DAILY (route: oral)      gabapentin (NEURONTIN) 300 MG capsule Take 1 capsule (300 mg total) by mouth 2 (two) times daily. 180 capsule 2    GADAVIST 1 mmol/mL (604.72 mg/mL) Soln injection       loperamide (IMODIUM) 2 mg capsule TAKE 1 CAPSULE BY MOUTH 4 TIMES DAILY AS NEEDED FOR DIARRHEA. 60 capsule 0    metFORMIN (GLUCOPHAGE) 1000 MG tablet Take 1 tablet (1,000 mg total) by mouth 2 (two) times daily with meals. 180 tablet 3    omeprazole (PRILOSEC) 40 MG capsule TAKE 1 CAPSULE BY MOUTH EVERY DAY 90 capsule 1    OXYGEN-AIR DELIVERY SYSTEMS MISC 1-2 Liter O2 - CONTINUOUS (route: Oxygen)      pomalidomide (POMALYST) 2 mg Cap Take 2 mg by mouth Daily. 21 capsule 0    potassium chloride SA (K-DUR,KLOR-CON) 20 MEQ tablet       sertraline (ZOLOFT) 50 MG tablet Take 50 mg by mouth.      suvorexant (BELSOMRA) 10 mg Tab Take 1 tablet by mouth every  evening. 30 tablet 3    valsartan (DIOVAN) 160 MG tablet Take 1 tablet (160 mg total) by mouth once daily. 90 tablet 3    zoledronic 4 mg/100 mL PgBk infusion       zoledronic acid (ZOMETA) 4 mg/5 mL injection        No current facility-administered medications for this visit.     Facility-Administered Medications Ordered in Other Visits   Medication Dose Route Frequency Provider Last Rate Last Admin    daratumumab-hyaluronidase-fihj subcutaneous injection 1,800 mg  1,800 mg Subcutaneous 1 time in Clinic/HOD Adina Wright, NP        diphenhydrAMINE injection 50 mg  50 mg Intravenous Once PRN Adina Wright, KISHOR        EPINEPHrine (EPIPEN) 0.3 mg/0.3 mL pen injection 0.3 mg  0.3 mg Intramuscular Once PRN Adina Wright, NP        hydrocortisone sodium succinate injection 100 mg  100 mg Intravenous Once PRN Adina Wright, NP          See HPI     ECOG Performance Status: 2 (due to remote  CVA)   Objective:      Vitals:   Vitals:    10/12/23 0952   BP: 130/79   Pulse: 62   Resp: 15   Temp: 97.9 °F (36.6 °C)       General - somewhat frail appearing, no apparent distress  HEENT - oropharynx clear  Chest and Lung - clear to auscultation bilaterally   Cardiovascular - RRR with no MGR, normal S1 and S2  Abdomen-  soft, nontender, no palpable hepatomegaly or splenomegaly  Lymph - no palpable lymphadenopathy  Heme - no bruising, petechiae, pallor  Skin - no rashes or lesions  Psych - appropriate mood and affect       Laboratory Data:  Lab Results   Component Value Date    WBC 3.33 (L) 10/12/2023    HGB 10.4 (L) 10/12/2023    HCT 34.3 (L) 10/12/2023    MCV 83 10/12/2023     10/12/2023       Gran # (ANC)   Date Value Ref Range Status   10/12/2023 1.9 1.8 - 7.7 K/uL Final     Gran %   Date Value Ref Range Status   10/12/2023 56.8 38.0 - 73.0 % Final     CMP  Sodium   Date Value Ref Range Status   10/12/2023 141 136 - 145 mmol/L Final     Potassium   Date Value Ref Range Status   10/12/2023 3.5 3.5 - 5.1 mmol/L Final      Chloride   Date Value Ref Range Status   10/12/2023 104 95 - 110 mmol/L Final     CO2   Date Value Ref Range Status   10/12/2023 31 (H) 23 - 29 mmol/L Final     Glucose   Date Value Ref Range Status   10/12/2023 85 70 - 110 mg/dL Final     BUN   Date Value Ref Range Status   10/12/2023 13 8 - 23 mg/dL Final     Creatinine   Date Value Ref Range Status   10/12/2023 0.8 0.5 - 1.4 mg/dL Final     Calcium   Date Value Ref Range Status   10/12/2023 8.9 8.7 - 10.5 mg/dL Final     Total Protein   Date Value Ref Range Status   10/12/2023 7.0 6.0 - 8.4 g/dL Final     Albumin   Date Value Ref Range Status   10/12/2023 4.0 3.5 - 5.2 g/dL Final     Total Bilirubin   Date Value Ref Range Status   10/12/2023 0.4 0.1 - 1.0 mg/dL Final     Comment:     For infants and newborns, interpretation of results should be based  on gestational age, weight and in agreement with clinical  observations.    Premature Infant recommended reference ranges:  Up to 24 hours.............<8.0 mg/dL  Up to 48 hours............<12.0 mg/dL  3-5 days..................<15.0 mg/dL  6-29 days.................<15.0 mg/dL       Alkaline Phosphatase   Date Value Ref Range Status   10/12/2023 43 (L) 55 - 135 U/L Final     AST   Date Value Ref Range Status   10/12/2023 10 10 - 40 U/L Final     ALT   Date Value Ref Range Status   10/12/2023 8 (L) 10 - 44 U/L Final     Anion Gap   Date Value Ref Range Status   10/12/2023 6 (L) 8 - 16 mmol/L Final     eGFR   Date Value Ref Range Status   10/12/2023 >60.0 >60 mL/min/1.73 m^2 Final     Kappa Free Light Chains   Date Value Ref Range Status   09/14/2023 11.05 (H) 0.33 - 1.94 mg/dL Final   08/11/2023 10.36 (H) 0.33 - 1.94 mg/dL Final   07/14/2023 10.66 (H) 0.33 - 1.94 mg/dL Final     Lambda Free Light Chains   Date Value Ref Range Status   09/14/2023 1.88 0.57 - 2.63 mg/dL Final   08/11/2023 1.55 0.57 - 2.63 mg/dL Final   07/14/2023 2.34 0.57 - 2.63 mg/dL Final     Kappa/Lambda FLC Ratio   Date Value Ref Range Status    09/14/2023 5.88 (H) 0.26 - 1.65 Final     Comment:     Undetected antigen excess is a rare event but cannot   be excluded. If these free light chain results do not   agree with other clinical or laboratory findings or   if the sample is from a patient that has previously   demonstrated antigen excess, discuss with the testing   laboratory.   Results should always be interpreted in conjunction   with other laboratory tests and clinical evidence.     08/11/2023 6.68 (H) 0.26 - 1.65 Final     Comment:     Undetected antigen excess is a rare event but cannot   be excluded. If these free light chain results do not   agree with other clinical or laboratory findings or   if the sample is from a patient that has previously   demonstrated antigen excess, discuss with the testing   laboratory.   Results should always be interpreted in conjunction   with other laboratory tests and clinical evidence.     07/14/2023 4.56 (H) 0.26 - 1.65 Final     Comment:     Undetected antigen excess is a rare event but cannot   be excluded. If these free light chain results do not   agree with other clinical or laboratory findings or   if the sample is from a patient that has previously   demonstrated antigen excess, discuss with the testing   laboratory.   Results should always be interpreted in conjunction   with other laboratory tests and clinical evidence.       IgG   Date Value Ref Range Status   10/12/2023 1009 650 - 1600 mg/dL Final     Comment:     IgG Cord Blood Reference Range: 650-1600 mg/dL.     IgA   Date Value Ref Range Status   10/12/2023 131 40 - 350 mg/dL Final     Comment:     IgA Cord Blood Reference Range: <5 mg/dL.     IgM   Date Value Ref Range Status   10/12/2023 16 (L) 50 - 300 mg/dL Final     Comment:     IgM Cord Blood Reference Range: <25 mg/dL.     Pathologist Interpretation SPE   Date Value Ref Range Status   09/14/2023 REVIEWED  Final     Comment:       Electronically reviewed and signed by:  Steve Medrano  MD  Signed on 09/18/23 at 14:21  Normal total protein. Normal gamma globulins are decreased. There is   a paraprotein band in gamma = 0.36 g/dL, previously 0.44 g/dL.      08/11/2023 REVIEWED  Final     Comment:       Electronically reviewed and signed by:  Destiny Lopes M.D.  Signed on 08/15/23 at 13:16  Normal total protein.   Normal gamma globulins are decreased.   There is a paraprotein band in gamma = 0.44 g/dL, previously 0.29   g/dL.      07/14/2023 REVIEWED  Final     Comment:       Electronically reviewed and signed by:  Destiny Lopes M.D.  Signed on 07/18/23 at 15:33  Normal total protein. Normal gamma globulins are decreased. There is   a paraprotein band in gamma = 0.29 g/dL, previously 0.40 g/dL.        Pathologist Interpretation NATALY   Date Value Ref Range Status   09/14/2023 REVIEWED  Final     Comment:       Electronically reviewed and signed by:  Steve Medrano MD  Signed on 09/20/23 at 16:55  An IgG kappa specific monoclonal protein is present.            Imaging:      Assessment:       Ms. Thompson is a  73 y.o. femal with relapsed multiple myeloma.     Plan:     MM s/p Auto SCT  - high risk MM with 17p deletion  - 2 year 8 months  s/p Auto SCT  - started maintenance q2w velcade 5/6/20, serial biochemical relapse  Noted and  given this and high risk CG , transitioned to Sravanthi/Noble (1mg/m2) /dex  Salvage July 2021. Due to uncontrolled hyperglycemia dex stopped after C4.    - she demonstrated mild biochemical progression over summer 2022 studies with no CRAB, in light of this and high risk CG  we transitioned her therapy from Sravanthi/Velcade to Sravanthi/pomalyst; intolerant to dex.   - biochemical studies from 09/14/23 cw with continued ME so plan to continue sravanthi/pom until intolerance or progression   - Pomalyst dose decreased to  to 2mg (5/19/23) due to persistent neutropenia  now improved   - supportive meds: continue ppx acyclovir, asa; resume maintenance zometa q 3 months    - recommended she  take prn imodium for occasional loose stool with pomalyst   - post transplant vaccines completed     Anemia due to chemotherapy/JESSE  - Due to therapy and JESSE  - Continue venofer q month when comes for her kenia     Neuropathy  - Stable   - continues gabapentin and prn tramadol     HTN  - continue norvasc  - Patient to monitor BP closely    Dementia/anxiety  - contniue fu with neuro/psych;  defer medication mgmt to them     Type II DM  - Managed by PCP      FU:     -cbc, cmp, serum free light chains, quantitative immunoglobulins, serum electropheresis, serum immunofixation and kenia injection and venofer infusion  in 4 weeks   -same labs, MD appt, kenia injection/zometa infusion/venofer infusion in 8 weeks       BMT Chart Routing      Follow up with physician . Keep scheduled lab, visit, kenia on 11/09   Follow up with CHRISTY    Provider visit type    Infusion scheduling note    Injection scheduling note Add Zometa on 11/09   Labs   Scheduling:  Preferred lab:  Lab interval:  -cbc, cmp, serum free light chains, quantitative immunoglobulins, serum electropheresis, serum immunofixation and kenia injection and venofer infusion  in 4 weeks   Imaging    Pharmacy appointment    Other referrals                  Adina Wright NP  Hematology/Oncology/BMT

## 2023-10-12 NOTE — PLAN OF CARE
Pt admitted for C28 Sravanthi SQ, Tx#7 Venofer and Zometa IVPB. Labs reviewed and assessment performed. Pt tolerated well. Observed 30 min post IVP Venofer. PIV removed and Pt discharged home with friend.

## 2023-10-15 DIAGNOSIS — C90.00 MULTIPLE MYELOMA, REMISSION STATUS UNSPECIFIED: ICD-10-CM

## 2023-10-15 DIAGNOSIS — T50.905A DRUG-INDUCED CYTOPENIA: ICD-10-CM

## 2023-10-15 DIAGNOSIS — D75.9 DRUG-INDUCED CYTOPENIA: ICD-10-CM

## 2023-10-17 ENCOUNTER — OFFICE VISIT (OUTPATIENT)
Dept: INTERNAL MEDICINE | Facility: CLINIC | Age: 73
End: 2023-10-17
Payer: MEDICARE

## 2023-10-17 ENCOUNTER — IMMUNIZATION (OUTPATIENT)
Dept: INTERNAL MEDICINE | Facility: CLINIC | Age: 73
End: 2023-10-17
Payer: MEDICARE

## 2023-10-17 VITALS
OXYGEN SATURATION: 97 % | HEART RATE: 60 BPM | HEIGHT: 62 IN | WEIGHT: 140.88 LBS | SYSTOLIC BLOOD PRESSURE: 104 MMHG | BODY MASS INDEX: 25.92 KG/M2 | DIASTOLIC BLOOD PRESSURE: 78 MMHG

## 2023-10-17 DIAGNOSIS — I15.2 HYPERTENSION ASSOCIATED WITH DIABETES: ICD-10-CM

## 2023-10-17 DIAGNOSIS — D50.0 IRON DEFICIENCY ANEMIA DUE TO CHRONIC BLOOD LOSS: ICD-10-CM

## 2023-10-17 DIAGNOSIS — Z94.81 STATUS POST AUTOLOGOUS BONE MARROW TRANSPLANT: ICD-10-CM

## 2023-10-17 DIAGNOSIS — G62.0 DRUG-INDUCED POLYNEUROPATHY: ICD-10-CM

## 2023-10-17 DIAGNOSIS — Z78.9 STATIN INTOLERANCE: Primary | ICD-10-CM

## 2023-10-17 DIAGNOSIS — Z79.4 TYPE 2 DIABETES MELLITUS WITH DIABETIC PERIPHERAL ANGIOPATHY WITHOUT GANGRENE, WITH LONG-TERM CURRENT USE OF INSULIN: ICD-10-CM

## 2023-10-17 DIAGNOSIS — I71.23 ANEURYSM OF DESCENDING THORACIC AORTA WITHOUT RUPTURE: ICD-10-CM

## 2023-10-17 DIAGNOSIS — E11.51 TYPE 2 DIABETES MELLITUS WITH DIABETIC PERIPHERAL ANGIOPATHY WITHOUT GANGRENE, WITH LONG-TERM CURRENT USE OF INSULIN: ICD-10-CM

## 2023-10-17 DIAGNOSIS — Z23 NEEDS FLU SHOT: Primary | ICD-10-CM

## 2023-10-17 DIAGNOSIS — C90.01 MULTIPLE MYELOMA IN REMISSION: ICD-10-CM

## 2023-10-17 DIAGNOSIS — E11.59 HYPERTENSION ASSOCIATED WITH DIABETES: ICD-10-CM

## 2023-10-17 DIAGNOSIS — Z86.010 HISTORY OF COLON POLYPS: ICD-10-CM

## 2023-10-17 PROCEDURE — G0008 FLU VACCINE - QUADRIVALENT - ADJUVANTED: ICD-10-PCS | Mod: HCNC,S$GLB,, | Performed by: INTERNAL MEDICINE

## 2023-10-17 PROCEDURE — 4010F ACE/ARB THERAPY RXD/TAKEN: CPT | Mod: HCNC,CPTII,S$GLB, | Performed by: INTERNAL MEDICINE

## 2023-10-17 PROCEDURE — 4010F PR ACE/ARB THEARPY RXD/TAKEN: ICD-10-PCS | Mod: HCNC,CPTII,S$GLB, | Performed by: INTERNAL MEDICINE

## 2023-10-17 PROCEDURE — 99999 PR PBB SHADOW E&M-EST. PATIENT-LVL IV: ICD-10-PCS | Mod: PBBFAC,HCNC,, | Performed by: INTERNAL MEDICINE

## 2023-10-17 PROCEDURE — 90694 FLU VACCINE - QUADRIVALENT - ADJUVANTED: ICD-10-PCS | Mod: HCNC,S$GLB,, | Performed by: INTERNAL MEDICINE

## 2023-10-17 PROCEDURE — 3008F PR BODY MASS INDEX (BMI) DOCUMENTED: ICD-10-PCS | Mod: HCNC,CPTII,S$GLB, | Performed by: INTERNAL MEDICINE

## 2023-10-17 PROCEDURE — 1159F MED LIST DOCD IN RCRD: CPT | Mod: HCNC,CPTII,S$GLB, | Performed by: INTERNAL MEDICINE

## 2023-10-17 PROCEDURE — 3074F SYST BP LT 130 MM HG: CPT | Mod: HCNC,CPTII,S$GLB, | Performed by: INTERNAL MEDICINE

## 2023-10-17 PROCEDURE — 3078F PR MOST RECENT DIASTOLIC BLOOD PRESSURE < 80 MM HG: ICD-10-PCS | Mod: HCNC,CPTII,S$GLB, | Performed by: INTERNAL MEDICINE

## 2023-10-17 PROCEDURE — 1126F AMNT PAIN NOTED NONE PRSNT: CPT | Mod: HCNC,CPTII,S$GLB, | Performed by: INTERNAL MEDICINE

## 2023-10-17 PROCEDURE — 99214 PR OFFICE/OUTPT VISIT, EST, LEVL IV, 30-39 MIN: ICD-10-PCS | Mod: HCNC,25,S$GLB, | Performed by: INTERNAL MEDICINE

## 2023-10-17 PROCEDURE — 3074F PR MOST RECENT SYSTOLIC BLOOD PRESSURE < 130 MM HG: ICD-10-PCS | Mod: HCNC,CPTII,S$GLB, | Performed by: INTERNAL MEDICINE

## 2023-10-17 PROCEDURE — 1101F PT FALLS ASSESS-DOCD LE1/YR: CPT | Mod: HCNC,CPTII,S$GLB, | Performed by: INTERNAL MEDICINE

## 2023-10-17 PROCEDURE — G0008 ADMIN INFLUENZA VIRUS VAC: HCPCS | Mod: HCNC,S$GLB,, | Performed by: INTERNAL MEDICINE

## 2023-10-17 PROCEDURE — 1101F PR PT FALLS ASSESS DOC 0-1 FALLS W/OUT INJ PAST YR: ICD-10-PCS | Mod: HCNC,CPTII,S$GLB, | Performed by: INTERNAL MEDICINE

## 2023-10-17 PROCEDURE — 3044F PR MOST RECENT HEMOGLOBIN A1C LEVEL <7.0%: ICD-10-PCS | Mod: HCNC,CPTII,S$GLB, | Performed by: INTERNAL MEDICINE

## 2023-10-17 PROCEDURE — 3288F FALL RISK ASSESSMENT DOCD: CPT | Mod: HCNC,CPTII,S$GLB, | Performed by: INTERNAL MEDICINE

## 2023-10-17 PROCEDURE — 99999 PR PBB SHADOW E&M-EST. PATIENT-LVL IV: CPT | Mod: PBBFAC,HCNC,, | Performed by: INTERNAL MEDICINE

## 2023-10-17 PROCEDURE — 3008F BODY MASS INDEX DOCD: CPT | Mod: HCNC,CPTII,S$GLB, | Performed by: INTERNAL MEDICINE

## 2023-10-17 PROCEDURE — 3078F DIAST BP <80 MM HG: CPT | Mod: HCNC,CPTII,S$GLB, | Performed by: INTERNAL MEDICINE

## 2023-10-17 PROCEDURE — 1159F PR MEDICATION LIST DOCUMENTED IN MEDICAL RECORD: ICD-10-PCS | Mod: HCNC,CPTII,S$GLB, | Performed by: INTERNAL MEDICINE

## 2023-10-17 PROCEDURE — 90694 VACC AIIV4 NO PRSRV 0.5ML IM: CPT | Mod: HCNC,S$GLB,, | Performed by: INTERNAL MEDICINE

## 2023-10-17 PROCEDURE — 1126F PR PAIN SEVERITY QUANTIFIED, NO PAIN PRESENT: ICD-10-PCS | Mod: HCNC,CPTII,S$GLB, | Performed by: INTERNAL MEDICINE

## 2023-10-17 PROCEDURE — 99214 OFFICE O/P EST MOD 30 MIN: CPT | Mod: HCNC,25,S$GLB, | Performed by: INTERNAL MEDICINE

## 2023-10-17 PROCEDURE — 3044F HG A1C LEVEL LT 7.0%: CPT | Mod: HCNC,CPTII,S$GLB, | Performed by: INTERNAL MEDICINE

## 2023-10-17 PROCEDURE — 3288F PR FALLS RISK ASSESSMENT DOCUMENTED: ICD-10-PCS | Mod: HCNC,CPTII,S$GLB, | Performed by: INTERNAL MEDICINE

## 2023-10-17 NOTE — PROGRESS NOTES
This note was generated with [employer] voice recognition software. I apologize for any possible typographical errors.        Subjective:       Patient ID:  Shelby is a 73 y.o. female being seen for follow .       Chief Complaint: Follow-up-  6  months. NO hospital of ED visit since last visit.        74 yo F with multiple myeloma (follows with Heme/Onc, s/p stem cell transplant Feb 2020), T2DM, thrombocytopenia, depression, drug induced neuropathy, chronic diastolic heart failure, hx of stroke (one in 2008, one in 2011) with hemiplegia on right side- using cane , HLD, anemia, colon polyps who presents for follow up. Prior smoker, quit in 2011.  She is accompanied by family member.    Oncologic History: Last seen by Hem/onc 3/24/2023.       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  She achieved and tolerated well VRd x completing 7, 28 day cycles and achieving deep VGPR to induction. Then underwent Melphalan autologous stem cell transplant on 2/12/2020.   She saw Onc NP last month-3/24/2023-  note reviewed-some below- they have her set up to see Neurology in MARCH 2023)  she  demonstrated mild biochemical progression over summer 2022 studies with no CRAB, in light of this and high risk CG   we transitioned her therapy from Sravanthi/Velcade to Sravanthi/pomalyst; intolerant to dex.   - biochemical studies from 2/24/23 cw with continued GA so plan to continue sravanthi/pom until intolerance or progression. Today's myeloma labs pending.   Pomalyst 4 mg dose adjusted to 3 mg due to worsening cytopenia(12/22/23).  Cytopenia improved, patient tolerating so continue current dose    -recommended she take prn imodium for occasional loose stool with pomalyst   -already on asa 81mg   -post transplant  "vaccines completed   Reviewed note from Hem- 10/12/2023.  "-cbc, cmp, serum free light chains, quantitative immunoglobulins, serum electropheresis, serum immunofixation and kenia injection and venofer infusion  in 4 weeks   -same labs, MD pastor, kenia injection/zometa infusion/venofer infusion in 8 weeks "      Anemia due to MM and chemo-- 10.4/34.3-- WBC 3.30.    Plt 159.          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    -Stable   - continues gabapentin and prn tramadol        SHe has diabetes.  Recent hgb A1c 5.3. on metformin.  No retinopathy-- going to need cataract surgery.      Htn on amlodipine and valsartan  on repatha for Hyperlipidemia due to statin intolerance.       She has COPD-- has not been using her inhalers.  She has not had to use home o2 recently.  SHe has avoided URI and pneumonias this winter.  Had cold 2 weeks ago and had no problems.              Fatigue: less fatigue than before-- doing pretty " good" per her ( she is still getting tired a lot per her friend) ,   Increased appetite from before, eats 3 meals per day, consuming ice cream about 3x/week . Sometimes will eat only 2 meals if she is sleeping.  Weight loss/gain:wt down 3 #   since last visit. ^ #  since the beginning of year.   Things don't taste like they use to.  SHe has ensure but does not drink it all the time.             Wt is down to 140#--         Right hand twitching, chronic.   Still able to cook.  helps with med administration and keeping track of blood sugars.   Elevates feet for leg swelling, uses compression stockings.            HLD with Statin Intolerance: on praluent, PCSK9 inhibitor-- lipid panel form 1/23 reviewed      Depression-- having hallucinations but less -She is seeing bugs.  See one on the wall behind me-- but not as many. .  She is getting confused.  NO SI or HI>  She is suppose to be on zofloft but she forgot this and has not tired this yet.                Review of Systems   Constitutional: Negative for fever. " Weight is down 3 #.    HENT: Negative for sore throat.    Eyes: Negative for blurred vision.   Respiratory: Negative for cough and shortness of breath.    Cardiovascular: Positive for leg swelling. Negative for chest pain.   Gastrointestinal: Negative for abdominal pain, constipation, diarrhea, nausea and vomiting.   Genitourinary: Negative for dysuria.   Musculoskeletal: Positive for myalgias.   Neurological: Negative for headaches.   Psychiatric/Behavioral: Positive confusion and she has hallucinations at times.  .               Patient Active Problem List   Diagnosis    Personal history of malignant neoplasm of breast    Mononeuritis of upper limb    Hyperlipemia    Gastropathy    Tremor    Nuclear sclerosis    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    Thoracic aortic aneurysm without rupture    Aortic atherosclerosis    History of CVA with residual deficit    Osteopenia    PAD (peripheral artery disease)    Liver mass    Recurrent major depressive disorder, in full remission    Statin intolerance    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)    Pulmonary heart disease    Ectatic aorta    Tortuous aorta    Acute respiratory failure with hypoxia    Budd-Chiari syndrome    PVC (premature ventricular contraction)    Multiple myeloma in relapse    Metastatic multiple myeloma to bone    Status post autologous bone marrow transplant    Abnormal PFT    Infection due to human metapneumovirus (hMPV)    Iron deficiency anemia due to chronic blood loss    History of colon polyps    Drug-induced polyneuropathy    Essential hypertension    Type 2 diabetes mellitus without complication, without long-term current use of insulin    Multiple myeloma in remission    Anemia in neoplastic disease    Hemiplegia and hemiparesis following cerebral infarction affecting right dominant side    Abnormal findings on diagnostic imaging of liver and biliary tract     Type 2 diabetes mellitus  "with diabetic peripheral angiopathy without gangrene, with long-term current use of insulin    History of auto stem cell transplant    Community acquired pneumonia of right lower lobe of lung    Thrombocytopenia, unspecified    Chronic obstructive pulmonary disease, unspecified COPD type    Mild neurocognitive disorder due to multiple etiologies             Objective:                 /78 (BP Location: Left arm, Patient Position: Sitting, BP Method: Medium (Manual))   Pulse 60   Ht 5' 2" (1.575 m)   Wt 63.9 kg (140 lb 14 oz)   LMP  (LMP Unknown)   SpO2 97%   BMI 25.77 kg/m²              Physical Exam  Off o2- O2 97 %.    Constitutional:       Appearance: Normal appearance.      Comments: Pleasant, in good spirits   HENT:      Head: Normocephalic and atraumatic.      Nose: Nose normal.      Mouth/Throat:      Mouth: Mucous membranes are moist.   Eyes:      General: No scleral icterus.     Conjunctiva/sclera: Conjunctivae normal.   Cardiovascular:      Rate and Rhythm: Normal rate and regular rhythm.      Pulses: Normal pulses.      Heart sounds: Normal heart sounds. No murmur heard.  Pulmonary:      Effort: Pulmonary effort is normal. No respiratory distress.      Breath sounds: Normal breath sounds. No wheezing or rales.   Abdominal:      General: Abdomen is flat. There is no distension.      Palpations: Abdomen is soft.      Tenderness: There is no abdominal tenderness. There is no guarding.   Musculoskeletal:         General: No swelling, tenderness or deformity.      Cervical back: Normal range of motion. No rigidity.      Right lower leg: No edema.      Left lower leg: No edema.    twitchy right arms--         Skin:     General: Skin is warm and dry.      Coloration: Skin is not jaundiced.   Neurological:      General: she has a tremor on the right upper extremity.      Mental Status: She is alert and oriented to person, place, and time.  Having visual hallucinations.     Psychiatric:         Mood and " Affect: Mood normal.         Behavior: Behavior normal.                     Assessment and Plan:             Type 2 diabetes mellitus without complication- doing well.       Multiple myeloma - being treated-- following with ONC-      Metastatic multiple myeloma to bone     Pulmonary heart disease-- o2 sats ok-- off O2 - still use occassionally       Type 2 diabetes mellitus with diabetic peripheral angiopathy without gangrene, with long-term current use of insulin-- doing fine      Thrombocytopenia, unspecified-- stable-  plt count 159  onc monitoring             Anemia in neoplastic disease     History of auto stem cell transplant     Chronic obstructive pulmonary disease, unspecified COPD type-- doing well      From CT on 4/25/2022  Was reviewed        Descending  Thoracic Aneurysm---- Stable appearance of saccular aneurysmal dilatation at the distal thoracic aorta near the level of diaphragm with associated mural thrombus not significantly changed.  Reviewed Vascular note  Due for CT scan         Plan:     She has COPD with a lot of emphysematous changes of her lungs and she has pulmonary heart disease.  I believe the oxygen would be beneficial to work to decrease her risk of right heart failure..  Last visit suggested trial at home and see it this helped.   She thinks it helps her confusion.       On review of her recent ct of her chest/abdomen --  saccular aneurysm she has in her thoracic aorta - stable at 4.5 cm - and the lung consolidation has resolved.   Descending Thoracic aneurysm    will get CT A  to follow that up       Weight lost graduate-- suggest ensure daily with 2 regular meals, daibetes is bell controled-- discussed ensure.      I will see her back again in6  months or sooner if needed.

## 2023-10-18 DIAGNOSIS — F33.42 MAJOR DEPRESSIVE DISORDER, RECURRENT, IN FULL REMISSION: ICD-10-CM

## 2023-10-18 NOTE — TELEPHONE ENCOUNTER
Care Due:                  Date            Visit Type   Department     Provider  --------------------------------------------------------------------------------                                EP -                              PRIMARY      [facility] INTERNAL  Last Visit: 10-      CARE ([facility])   ASHLI Arevalo                              EP -                              PRIMARY      [facility] INTERNAL  Next Visit: 04-      CARE ([facility])   MEDICINE       Emanuel Arevalo                                                            Last  Test          Frequency    Reason                     Performed    Due Date  --------------------------------------------------------------------------------    HBA1C.......  6 months...  metFORMIN................  04-   10-    [facility] Embedded Care Due Messages. Reference number: [id number].   10/18/2023 1:32:53 AM CDT

## 2023-10-19 RX ORDER — SERTRALINE HYDROCHLORIDE 50 MG/1
50 TABLET, FILM COATED ORAL
Qty: 90 TABLET | Refills: 3 | Status: SHIPPED | OUTPATIENT
Start: 2023-10-19 | End: 2024-03-31 | Stop reason: SDUPTHER

## 2023-10-19 NOTE — TELEPHONE ENCOUNTER
Refill Routing Note   Medication(s) are not appropriate for processing by [facility] for the following reason(s):      No active prescription written by PCP    [facility] action(s):  Defer Care Due:  None identified: FLOS 11/9/23          Appointments  past 12m or future 3m with PCP    Date Provider   Last Visit   10/17/2023 Emanuel Arevalo Jr., MD   Next Visit   Visit date not found Emanuel Arevalo Jr., MD   ED visits in past 90 days: 0        Note composed:7:06 PM 10/18/2023

## 2023-10-24 DIAGNOSIS — C90.00 MULTIPLE MYELOMA, REMISSION STATUS UNSPECIFIED: ICD-10-CM

## 2023-10-24 DIAGNOSIS — D75.9 DRUG-INDUCED CYTOPENIA: ICD-10-CM

## 2023-10-24 DIAGNOSIS — T50.905A DRUG-INDUCED CYTOPENIA: ICD-10-CM

## 2023-10-24 NOTE — TELEPHONE ENCOUNTER
----- Message from Becky Stone sent at 10/24/2023 10:49 AM CDT -----  Regarding: Rx Clarification/ [employer] Auth Number  Contact: Delmis Benites is calling to clarify medication change and [employer] Auth Number. Please adv     Medication:pomalidomide (POMALYST) 2 mg Cap      Ph. [phone] (Direct Line)          [facility] - [place], [place] - [address]   [address]  [facility] [address]  Phone: [phone] Fax: [phone]

## 2023-10-27 ENCOUNTER — HOSPITAL ENCOUNTER (OUTPATIENT)
Dept: RADIOLOGY | Facility: CLINIC | Age: 73
Discharge: HOME | End: 2023-10-27
Attending: INTERNAL MEDICINE
Payer: MEDICARE

## 2023-10-27 DIAGNOSIS — Z78.0 MENOPAUSE: ICD-10-CM

## 2023-10-27 PROCEDURE — 77080 DXA BONE DENSITY AXIAL SKELETON 1 OR MORE SITES: ICD-10-PCS | Mod: 26,HCNC,, | Performed by: INTERNAL MEDICINE

## 2023-10-27 PROCEDURE — 77080 DXA BONE DENSITY AXIAL: CPT | Mod: TC,HCNC

## 2023-10-27 PROCEDURE — 77080 DXA BONE DENSITY AXIAL: CPT | Mod: 26,HCNC,, | Performed by: INTERNAL MEDICINE

## 2023-10-30 RX ORDER — METFORMIN HYDROCHLORIDE 1000 MG/1
1000 TABLET ORAL 2 TIMES DAILY WITH MEALS
Qty: 180 TABLET | Refills: 3 | Status: SHIPPED | OUTPATIENT
Start: 2023-10-30

## 2023-10-30 NOTE — TELEPHONE ENCOUNTER
No care due was identified.  [facility] Embedded Care Due Messages. Reference number: [id number].   10/29/2023 9:50:48 PM CDT

## 2023-11-09 ENCOUNTER — OFFICE VISIT (OUTPATIENT)
Dept: HEMATOLOGY/ONCOLOGY | Facility: CLINIC | Age: 73
End: 2023-11-09
Payer: MEDICARE

## 2023-11-09 ENCOUNTER — INFUSION (OUTPATIENT)
Dept: INFUSION THERAPY | Facility: HOSPITAL | Age: 73
End: 2023-11-09
Payer: MEDICARE

## 2023-11-09 VITALS
TEMPERATURE: 98 F | HEART RATE: 55 BPM | RESPIRATION RATE: 18 BRPM | DIASTOLIC BLOOD PRESSURE: 60 MMHG | SYSTOLIC BLOOD PRESSURE: 140 MMHG

## 2023-11-09 VITALS
WEIGHT: 136.44 LBS | TEMPERATURE: 98 F | RESPIRATION RATE: 18 BRPM | SYSTOLIC BLOOD PRESSURE: 136 MMHG | HEIGHT: 62 IN | OXYGEN SATURATION: 99 % | BODY MASS INDEX: 25.11 KG/M2 | DIASTOLIC BLOOD PRESSURE: 64 MMHG | HEART RATE: 50 BPM

## 2023-11-09 DIAGNOSIS — D50.9 IRON DEFICIENCY ANEMIA, UNSPECIFIED IRON DEFICIENCY ANEMIA TYPE: ICD-10-CM

## 2023-11-09 DIAGNOSIS — D84.9 IMMUNOCOMPROMISED: ICD-10-CM

## 2023-11-09 DIAGNOSIS — Z94.81 STATUS POST AUTOLOGOUS BONE MARROW TRANSPLANT: ICD-10-CM

## 2023-11-09 DIAGNOSIS — T50.905A DRUG-INDUCED CYTOPENIA: ICD-10-CM

## 2023-11-09 DIAGNOSIS — Z94.84 HISTORY OF AUTO STEM CELL TRANSPLANT: Primary | ICD-10-CM

## 2023-11-09 DIAGNOSIS — D75.9 DRUG-INDUCED CYTOPENIA: ICD-10-CM

## 2023-11-09 DIAGNOSIS — D84.821 IMMUNODEFICIENCY DUE TO DRUGS: ICD-10-CM

## 2023-11-09 DIAGNOSIS — Z79.899 IMMUNODEFICIENCY DUE TO DRUGS: ICD-10-CM

## 2023-11-09 DIAGNOSIS — G63 NEUROPATHY ASSOCIATED WITH CANCER: ICD-10-CM

## 2023-11-09 DIAGNOSIS — D50.0 IRON DEFICIENCY ANEMIA DUE TO CHRONIC BLOOD LOSS: Primary | ICD-10-CM

## 2023-11-09 DIAGNOSIS — F03.90 DEMENTIA, UNSPECIFIED DEMENTIA SEVERITY, UNSPECIFIED DEMENTIA TYPE, UNSPECIFIED WHETHER BEHAVIORAL, PSYCHOTIC, OR MOOD DISTURBANCE OR ANXIETY: ICD-10-CM

## 2023-11-09 DIAGNOSIS — C90.02 MULTIPLE MYELOMA IN RELAPSE: ICD-10-CM

## 2023-11-09 DIAGNOSIS — C80.1 NEUROPATHY ASSOCIATED WITH CANCER: ICD-10-CM

## 2023-11-09 DIAGNOSIS — I10 ESSENTIAL HYPERTENSION: ICD-10-CM

## 2023-11-09 DIAGNOSIS — C90.00 MULTIPLE MYELOMA, REMISSION STATUS UNSPECIFIED: ICD-10-CM

## 2023-11-09 PROCEDURE — 3288F PR FALLS RISK ASSESSMENT DOCUMENTED: ICD-10-PCS | Mod: HCNC,CPTII,S$GLB, | Performed by: NURSE PRACTITIONER

## 2023-11-09 PROCEDURE — 99214 PR OFFICE/OUTPT VISIT, EST, LEVL IV, 30-39 MIN: ICD-10-PCS | Mod: HCNC,S$GLB,, | Performed by: NURSE PRACTITIONER

## 2023-11-09 PROCEDURE — 1126F PR PAIN SEVERITY QUANTIFIED, NO PAIN PRESENT: ICD-10-PCS | Mod: HCNC,CPTII,S$GLB, | Performed by: NURSE PRACTITIONER

## 2023-11-09 PROCEDURE — 96375 TX/PRO/DX INJ NEW DRUG ADDON: CPT | Mod: HCNC

## 2023-11-09 PROCEDURE — 3075F PR MOST RECENT SYSTOLIC BLOOD PRESS GE 130-139MM HG: ICD-10-PCS | Mod: HCNC,CPTII,S$GLB, | Performed by: NURSE PRACTITIONER

## 2023-11-09 PROCEDURE — 1159F PR MEDICATION LIST DOCUMENTED IN MEDICAL RECORD: ICD-10-PCS | Mod: HCNC,CPTII,S$GLB, | Performed by: NURSE PRACTITIONER

## 2023-11-09 PROCEDURE — 1160F PR REVIEW ALL MEDS BY PRESCRIBER/CLIN PHARMACIST DOCUMENTED: ICD-10-PCS | Mod: HCNC,CPTII,S$GLB, | Performed by: NURSE PRACTITIONER

## 2023-11-09 PROCEDURE — 1126F AMNT PAIN NOTED NONE PRSNT: CPT | Mod: HCNC,CPTII,S$GLB, | Performed by: NURSE PRACTITIONER

## 2023-11-09 PROCEDURE — 3078F PR MOST RECENT DIASTOLIC BLOOD PRESSURE < 80 MM HG: ICD-10-PCS | Mod: HCNC,CPTII,S$GLB, | Performed by: NURSE PRACTITIONER

## 2023-11-09 PROCEDURE — 4010F PR ACE/ARB THEARPY RXD/TAKEN: ICD-10-PCS | Mod: HCNC,CPTII,S$GLB, | Performed by: NURSE PRACTITIONER

## 2023-11-09 PROCEDURE — 1159F MED LIST DOCD IN RCRD: CPT | Mod: HCNC,CPTII,S$GLB, | Performed by: NURSE PRACTITIONER

## 2023-11-09 PROCEDURE — 3288F FALL RISK ASSESSMENT DOCD: CPT | Mod: HCNC,CPTII,S$GLB, | Performed by: NURSE PRACTITIONER

## 2023-11-09 PROCEDURE — 3044F PR MOST RECENT HEMOGLOBIN A1C LEVEL <7.0%: ICD-10-PCS | Mod: HCNC,CPTII,S$GLB, | Performed by: NURSE PRACTITIONER

## 2023-11-09 PROCEDURE — 3008F PR BODY MASS INDEX (BMI) DOCUMENTED: ICD-10-PCS | Mod: HCNC,CPTII,S$GLB, | Performed by: NURSE PRACTITIONER

## 2023-11-09 PROCEDURE — 1101F PR PT FALLS ASSESS DOC 0-1 FALLS W/OUT INJ PAST YR: ICD-10-PCS | Mod: HCNC,CPTII,S$GLB, | Performed by: NURSE PRACTITIONER

## 2023-11-09 PROCEDURE — 99999 PR PBB SHADOW E&M-EST. PATIENT-LVL V: CPT | Mod: PBBFAC,HCNC,, | Performed by: NURSE PRACTITIONER

## 2023-11-09 PROCEDURE — 63600175 PHARM REV CODE 636 W HCPCS: Mod: HCNC | Performed by: NURSE PRACTITIONER

## 2023-11-09 PROCEDURE — 96401 CHEMO ANTI-NEOPL SQ/IM: CPT | Mod: HCNC

## 2023-11-09 PROCEDURE — 4010F ACE/ARB THERAPY RXD/TAKEN: CPT | Mod: HCNC,CPTII,S$GLB, | Performed by: NURSE PRACTITIONER

## 2023-11-09 PROCEDURE — 3044F HG A1C LEVEL LT 7.0%: CPT | Mod: HCNC,CPTII,S$GLB, | Performed by: NURSE PRACTITIONER

## 2023-11-09 PROCEDURE — 96365 THER/PROPH/DIAG IV INF INIT: CPT

## 2023-11-09 PROCEDURE — 3078F DIAST BP <80 MM HG: CPT | Mod: HCNC,CPTII,S$GLB, | Performed by: NURSE PRACTITIONER

## 2023-11-09 PROCEDURE — 1101F PT FALLS ASSESS-DOCD LE1/YR: CPT | Mod: HCNC,CPTII,S$GLB, | Performed by: NURSE PRACTITIONER

## 2023-11-09 PROCEDURE — 25000003 PHARM REV CODE 250: Mod: HCNC | Performed by: NURSE PRACTITIONER

## 2023-11-09 PROCEDURE — 99214 OFFICE O/P EST MOD 30 MIN: CPT | Mod: HCNC,S$GLB,, | Performed by: NURSE PRACTITIONER

## 2023-11-09 PROCEDURE — 99999 PR PBB SHADOW E&M-EST. PATIENT-LVL V: ICD-10-PCS | Mod: PBBFAC,HCNC,, | Performed by: NURSE PRACTITIONER

## 2023-11-09 PROCEDURE — 3075F SYST BP GE 130 - 139MM HG: CPT | Mod: HCNC,CPTII,S$GLB, | Performed by: NURSE PRACTITIONER

## 2023-11-09 PROCEDURE — 96367 TX/PROPH/DG ADDL SEQ IV INF: CPT | Mod: HCNC

## 2023-11-09 PROCEDURE — 1160F RVW MEDS BY RX/DR IN RCRD: CPT | Mod: HCNC,CPTII,S$GLB, | Performed by: NURSE PRACTITIONER

## 2023-11-09 PROCEDURE — 3008F BODY MASS INDEX DOCD: CPT | Mod: HCNC,CPTII,S$GLB, | Performed by: NURSE PRACTITIONER

## 2023-11-09 RX ORDER — IRON SUCROSE 20 MG/ML
INJECTION, SOLUTION INTRAVENOUS
COMMUNITY
Start: 2023-08-11

## 2023-11-09 RX ORDER — DIPHENHYDRAMINE HYDROCHLORIDE 50 MG/ML
50 INJECTION INTRAMUSCULAR; INTRAVENOUS ONCE AS NEEDED
Status: CANCELLED | OUTPATIENT
Start: 2023-11-11 | End: 2035-04-08

## 2023-11-09 RX ORDER — SODIUM CHLORIDE 0.9 % (FLUSH) 0.9 %
10 SYRINGE (ML) INJECTION
Status: DISCONTINUED | OUTPATIENT
Start: 2023-11-09 | End: 2023-11-09 | Stop reason: HOSPADM

## 2023-11-09 RX ORDER — EPINEPHRINE 0.3 MG/.3ML
0.3 INJECTION SUBCUTANEOUS ONCE AS NEEDED
Status: DISCONTINUED | OUTPATIENT
Start: 2023-11-09 | End: 2023-11-09 | Stop reason: HOSPADM

## 2023-11-09 RX ORDER — SODIUM CHLORIDE 0.9 % (FLUSH) 0.9 %
10 SYRINGE (ML) INJECTION
Status: CANCELLED | OUTPATIENT
Start: 2023-11-10

## 2023-11-09 RX ORDER — HEPARIN 100 UNIT/ML
500 SYRINGE INTRAVENOUS
Status: DISCONTINUED | OUTPATIENT
Start: 2023-11-09 | End: 2023-11-09 | Stop reason: HOSPADM

## 2023-11-09 RX ORDER — DIPHENHYDRAMINE HYDROCHLORIDE 50 MG/ML
50 INJECTION INTRAMUSCULAR; INTRAVENOUS ONCE AS NEEDED
Status: DISCONTINUED | OUTPATIENT
Start: 2023-11-09 | End: 2023-11-09 | Stop reason: HOSPADM

## 2023-11-09 RX ORDER — EPINEPHRINE 0.3 MG/.3ML
0.3 INJECTION SUBCUTANEOUS ONCE AS NEEDED
Status: CANCELLED | OUTPATIENT
Start: 2023-11-11 | End: 2035-04-08

## 2023-11-09 RX ORDER — HEPARIN 100 UNIT/ML
500 SYRINGE INTRAVENOUS
Status: CANCELLED | OUTPATIENT
Start: 2023-11-10

## 2023-11-09 RX ORDER — SODIUM CHLORIDE 0.9 % (FLUSH) 0.9 %
10 SYRINGE (ML) INJECTION
Status: CANCELLED | OUTPATIENT
Start: 2023-11-16 | Stop reason: HOSPADM

## 2023-11-09 RX ORDER — HEPARIN 100 UNIT/ML
500 SYRINGE INTRAVENOUS
Status: CANCELLED | OUTPATIENT
Start: 2023-11-16 | Stop reason: HOSPADM

## 2023-11-09 RX ADMIN — IRON SUCROSE 200 MG: 20 INJECTION, SOLUTION INTRAVENOUS at 09:11

## 2023-11-09 RX ADMIN — ZOLEDRONIC ACID 3.3 MG: 4 INJECTION, SOLUTION, CONCENTRATE INTRAVENOUS at 10:11

## 2023-11-09 RX ADMIN — DARATUMUMAB AND HYALURONIDASE-FIHJ (HUMAN RECOMBINANT) 1800 MG: 1800; 30000 INJECTION SUBCUTANEOUS at 09:11

## 2023-11-09 RX ADMIN — SODIUM CHLORIDE: 9 INJECTION, SOLUTION INTRAVENOUS at 09:11

## 2023-11-09 NOTE — PROGRESS NOTES
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  She achieved and tolerated well VRd x completing 7, 28 day cycles and achieving deep VGPR to induction. Then underwent Melphalan autologous stem cell transplant on 2/12/2020.      Extensive PMH as below.    2 prior CVAs (one in 2008, one in 2011)  L breast cancer DCIS stage 0 (s/p lumpectomy and radiation, no endocrine tx due to CVA)  HTN  DM II  Neuropathy, b/l hands  Prior smoker, quit in 2011  Hepatic lesions - vascular per recent MRI in 09 /2019 with no changes; will confirm no further rascon needed from help  Statin Intolerance - on praluent, PCSK9 inhibitor  HFpEF - EF 55%, diastolic dysfunction  Anxiety/Depression     ADMISSION SUMMARY: 2/10-2/26/2020  Admitted 2/10, tolerated chemo and transplant well. Engrafted on day +12. Remained afebrile throughout hospital stay and no mucositis. Experienced expected side effects of nausea and diarrhea controlled with antiemetics and Imodium. Discharged home with home PT/OT       Subjective:       Initial History: Ms. Thompson is a 73 y.o. female who presents for virtual follow up.  She is 3 yrs 8months post AutoSCT. Relapsed  at approximately  1 year post sct and was on Sravanthi/zhanna/dex until summer 2022 when changed to sravanthi/pom/dex due to biochemical only progression. Dex stopped due to mood issues.  Remains on sravanthi/pom/dex salvage. Tolerating dose adjusted pomalyst. Good response with IV iron. No active complaints today.     Generally feeling well with no complaints, dementia with hallucinations persist.     Past Medical History:   Past Medical History:   Diagnosis  Date    Acute respiratory failure with hypoxia 4/30/2019    FUENTES (acute kidney injury) 5/1/2019    Allergy     Anemia     Aortic aneurysm     Breast cancer 10/2011    left breast Stage 0 DCIS    Cataract     Chronic diastolic congestive heart failure 11/6/2015    Colon polyp     Community acquired pneumonia of right lower lobe of lung 8/3/2021    GERD (gastroesophageal reflux disease)     Hiatal hernia     History of colonic polyps     Hx of psychiatric care     Zoloft    HX: breast cancer     Hyperlipemia     Hypertension     ICH (intracerebral hemorrhage)     Immunocompromised 10/28/2022    Major depressive disorder, single episode, mild 6/23/2016    Nuclear sclerosis 7/21/2014    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 7/21/2014    PAD (peripheral artery disease) 11/6/2015    Psychiatric problem     Statin-induced rhabdomyolysis     4/2015     Stroke 4/2011    Type 2 diabetes mellitus with diabetic peripheral angiopathy without gangrene, with long-term current use of insulin 3/31/2021    Type 2 diabetes mellitus without complication, without long-term current use of insulin 2/10/2020    Walker as ambulation aid        Past Surgical HIstory:   Past Surgical History:   Procedure Laterality Date    APPENDECTOMY      BONE MARROW BIOPSY Left 10/3/2019    Procedure: Biopsy-bone marrow;  Surgeon: Jere Tineo MD;  Location: [facility] OR [address] [place] [address];  Service: Oncology;  Laterality: Left;    BREAST BIOPSY Left 10/2011    BREAST LUMPECTOMY Left 2011    DCIS    COLONOSCOPY      COLONOSCOPY N/A 1/9/2020    Procedure: COLONOSCOPY;  Surgeon: Quang De La Cruz MD;  Location: [facility] (4TH FLR);  Service: Endoscopy;  Laterality: N/A;    CYST REMOVAL      back    ESOPHAGOGASTRODUODENOSCOPY  07/2016    duodenal angioectasia    ESOPHAGOGASTRODUODENOSCOPY N/A 1/9/2020    Procedure: EGD (ESOPHAGOGASTRODUODENOSCOPY);  Surgeon: Quang De La Cruz MD;  Location: [facility] ENDO (4TH FLR);  Service: Endoscopy;   Laterality: N/A;    FOOT FRACTURE SURGERY  10/2012    right foot, with R hallux valgus repair    FRACTURE SURGERY Right     broken toe repiar    HEMORRHOID SURGERY      LIVER BIOPSY  04/2015    essentially normal, no fibrosis    TUBAL LIGATION      UPPER GASTROINTESTINAL ENDOSCOPY         Family History:   Family History   Problem Relation Age of Onset    Dementia Sister         x1 sister    Cancer Sister 63        Lung Cancer    No Known Problems Mother     Cancer Father     No Known Problems Brother     Hypertension Daughter     Fibroids Daughter         uterine    Hypertension Son     No Known Problems Brother     No Known Problems Maternal Aunt     No Known Problems Maternal Uncle     No Known Problems Paternal Aunt     No Known Problems Paternal Uncle     Hypertension Maternal Grandmother     No Known Problems Maternal Grandfather     No Known Problems Paternal Grandmother     No Known Problems Paternal Grandfather     Ovarian cancer Neg Hx     Colon cancer Neg Hx     Tremor Neg Hx     Amblyopia Neg Hx     Blindness Neg Hx     Cataracts Neg Hx     Diabetes Neg Hx     Glaucoma Neg Hx     Macular degeneration Neg Hx     Retinal detachment Neg Hx     Strabismus Neg Hx     Stroke Neg Hx     Thyroid disease Neg Hx     Esophageal cancer Neg Hx     Rectal cancer Neg Hx     Stomach cancer Neg Hx     Ulcerative colitis Neg Hx     Crohn's disease Neg Hx     Irritable bowel syndrome Neg Hx     Celiac disease Neg Hx        Social History:  reports that she quit smoking about 12 years ago. Her smoking use included cigarettes. She started smoking about 56 years ago. She has a 11.1 pack-year smoking history. She has never used smokeless tobacco. She reports that she does not currently use alcohol. She reports that she does not use drugs.    Allergies:  Review of patient's allergies indicates:   Allergen Reactions    Lipitor [atorvastatin] Itching     Itching, elevated cpk, muscle aches, statin induced myositis        Medications:  Current Outpatient Medications   Medication Sig Dispense Refill    acyclovir (ZOVIRAX) 400 MG tablet TAKE 1 TABLET BY MOUTH TWICE A  tablet 3    albuterol (ACCUNEB) 1.25 mg/3 mL Nebu Take 3 mLs (1.25 mg total) by nebulization every 6 (six) hours as needed (wheeze/cough). Rescue 75 mL 2    amLODIPine (NORVASC) 5 MG tablet TAKE 1 TABLET BY MOUTH EVERY DAY 90 tablet 2    aspirin (ECOTRIN) 81 MG EC tablet TAKE 1 TABLET BY MOUTH EVERY DAY 90 tablet 3    b complex vitamins (B COMPLEX-VITAMIN B12) tablet Take 1 tablet by mouth once daily. 30 tablet 3    cholecalciferol, vitamin D3, 125 mcg (5,000 unit) Tab 1 tablet DAILY (route: oral)      cyanocobalamin, vitamin B-12, 5,000 mcg Subl Place one under tongue once a day for 1 month, then continue to take under tongue per week 30 tablet 3    DARZALEX FASPRO 1,800 mg-30,000 unit/15 mL Soln       ENGERIX-B, PF, 20 mcg/mL Syrg       evolocumab (REPATHA SURECLICK) 140 mg/mL PnIj Inject 1 mL (140 mg total) into the skin every 14 (fourteen) days. 6 mL 3    ferrous gluconate 324 mg (37.5 mg iron) Tab tablet 1 tablet DAILY (route: oral)      gabapentin (NEURONTIN) 300 MG capsule Take 1 capsule (300 mg total) by mouth 2 (two) times daily. 180 capsule 2    GADAVIST 1 mmol/mL (604.72 mg/mL) Soln injection       loperamide (IMODIUM) 2 mg capsule TAKE 1 CAPSULE BY MOUTH 4 TIMES DAILY AS NEEDED FOR DIARRHEA. 60 capsule 0    metFORMIN (GLUCOPHAGE) 1000 MG tablet Take 1 tablet (1,000 mg total) by mouth 2 (two) times daily with meals. 180 tablet 3    omeprazole (PRILOSEC) 40 MG capsule TAKE 1 CAPSULE BY MOUTH EVERY DAY 90 capsule 1    OXYGEN-AIR DELIVERY SYSTEMS MISC 1-2 Liter O2 - CONTINUOUS (route: Oxygen)      pomalidomide 2 mg Cap TAKE 1 CAPSULE (2MG) BY MOUTH EVERY DAY on days 1-21 of a 28 day cycle.  [facility] [phone] 10/24/23. 21 capsule 0    potassium chloride SA (K-DUR,KLOR-CON) 20 MEQ tablet       sertraline (ZOLOFT) 50 MG tablet TAKE 1 TABLET BY MOUTH EVERY  DAY 90 tablet 3    suvorexant (BELSOMRA) 10 mg Tab Take 1 tablet by mouth every evening. 30 tablet 3    valsartan (DIOVAN) 160 MG tablet Take 1 tablet (160 mg total) by mouth once daily. 90 tablet 3    VENOFER 100 mg iron/5 mL injection       zoledronic 4 mg/100 mL PgBk infusion       zoledronic acid (ZOMETA) 4 mg/5 mL injection        No current facility-administered medications for this visit.      See HPI     ECOG Performance Status: 2 (due to remote  CVA)   Objective:      Vitals:   Vitals:    11/09/23 0833   BP: 136/64   Pulse: (!) 50   Resp: 18   Temp: 97.7 °F (36.5 °C)       General - somewhat frail appearing, no apparent distress  HEENT - oropharynx clear  Chest and Lung - clear to auscultation bilaterally   Cardiovascular - RRR with no MGR, normal S1 and S2  Abdomen-  soft, nontender, no palpable hepatomegaly or splenomegaly  Lymph - no palpable lymphadenopathy  Heme - no bruising, petechiae, pallor  Skin - no rashes or lesions  Psych - appropriate mood and affect       Laboratory Data:  Lab Results   Component Value Date    WBC 3.44 (L) 11/09/2023    HGB 11.1 (L) 11/09/2023    HCT 35.7 (L) 11/09/2023    MCV 82 11/09/2023     (L) 11/09/2023       Gran # (ANC)   Date Value Ref Range Status   11/09/2023 2.2 1.8 - 7.7 K/uL Final     Gran %   Date Value Ref Range Status   11/09/2023 64.8 38.0 - 73.0 % Final     CMP  Sodium   Date Value Ref Range Status   11/09/2023 144 136 - 145 mmol/L Final     Potassium   Date Value Ref Range Status   11/09/2023 3.6 3.5 - 5.1 mmol/L Final     Chloride   Date Value Ref Range Status   11/09/2023 108 95 - 110 mmol/L Final     CO2   Date Value Ref Range Status   11/09/2023 26 23 - 29 mmol/L Final     Glucose   Date Value Ref Range Status   11/09/2023 99 70 - 110 mg/dL Final     BUN   Date Value Ref Range Status   11/09/2023 9 8 - 23 mg/dL Final     Creatinine   Date Value Ref Range Status   11/09/2023 0.9 0.5 - 1.4 mg/dL Final     Calcium   Date Value Ref Range Status    11/09/2023 9.7 8.7 - 10.5 mg/dL Final     Total Protein   Date Value Ref Range Status   11/09/2023 7.1 6.0 - 8.4 g/dL Final     Albumin   Date Value Ref Range Status   11/09/2023 4.0 3.5 - 5.2 g/dL Final     Total Bilirubin   Date Value Ref Range Status   11/09/2023 0.5 0.1 - 1.0 mg/dL Final     Comment:     For infants and newborns, interpretation of results should be based  on gestational age, weight and in agreement with clinical  observations.    Premature Infant recommended reference ranges:  Up to 24 hours.............<8.0 mg/dL  Up to 48 hours............<12.0 mg/dL  3-5 days..................<15.0 mg/dL  6-29 days.................<15.0 mg/dL       Alkaline Phosphatase   Date Value Ref Range Status   11/09/2023 37 (L) 55 - 135 U/L Final     AST   Date Value Ref Range Status   11/09/2023 11 10 - 40 U/L Final     ALT   Date Value Ref Range Status   11/09/2023 7 (L) 10 - 44 U/L Final     Anion Gap   Date Value Ref Range Status   11/09/2023 10 8 - 16 mmol/L Final     eGFR   Date Value Ref Range Status   11/09/2023 >60.0 >60 mL/min/1.73 m^2 Final     Kappa Free Light Chains   Date Value Ref Range Status   10/12/2023 10.06 (H) 0.33 - 1.94 mg/dL Final   09/14/2023 11.05 (H) 0.33 - 1.94 mg/dL Final   08/11/2023 10.36 (H) 0.33 - 1.94 mg/dL Final     Lambda Free Light Chains   Date Value Ref Range Status   10/12/2023 1.95 0.57 - 2.63 mg/dL Final   09/14/2023 1.88 0.57 - 2.63 mg/dL Final   08/11/2023 1.55 0.57 - 2.63 mg/dL Final     Kappa/Lambda FLC Ratio   Date Value Ref Range Status   10/12/2023 5.16 (H) 0.26 - 1.65 Final     Comment:     Undetected antigen excess is a rare event but cannot   be excluded. If these free light chain results do not   agree with other clinical or laboratory findings or   if the sample is from a patient that has previously   demonstrated antigen excess, discuss with the testing   laboratory.   Results should always be interpreted in conjunction   with other laboratory tests and clinical  evidence.     09/14/2023 5.88 (H) 0.26 - 1.65 Final     Comment:     Undetected antigen excess is a rare event but cannot   be excluded. If these free light chain results do not   agree with other clinical or laboratory findings or   if the sample is from a patient that has previously   demonstrated antigen excess, discuss with the testing   laboratory.   Results should always be interpreted in conjunction   with other laboratory tests and clinical evidence.     08/11/2023 6.68 (H) 0.26 - 1.65 Final     Comment:     Undetected antigen excess is a rare event but cannot   be excluded. If these free light chain results do not   agree with other clinical or laboratory findings or   if the sample is from a patient that has previously   demonstrated antigen excess, discuss with the testing   laboratory.   Results should always be interpreted in conjunction   with other laboratory tests and clinical evidence.       IgG   Date Value Ref Range Status   11/09/2023 1026 650 - 1600 mg/dL Final     Comment:     IgG Cord Blood Reference Range: 650-1600 mg/dL.     IgA   Date Value Ref Range Status   11/09/2023 133 40 - 350 mg/dL Final     Comment:     IgA Cord Blood Reference Range: <5 mg/dL.     IgM   Date Value Ref Range Status   11/09/2023 26 (L) 50 - 300 mg/dL Final     Comment:     IgM Cord Blood Reference Range: <25 mg/dL.     Pathologist Interpretation SPE   Date Value Ref Range Status   10/12/2023 REVIEWED  Final     Comment:       Electronically reviewed and signed by:  Shelli Haynes MD  Signed on 10/16/23 at 10:54  Normal total protein. Normal gamma globulins are decreased. There is   a paraprotein band in gamma = 0.44 g/dL, previously 0.36 g/dL.      09/14/2023 REVIEWED  Final     Comment:       Electronically reviewed and signed by:  Steve Medrano MD  Signed on 09/18/23 at 14:21  Normal total protein. Normal gamma globulins are decreased. There is   a paraprotein band in gamma = 0.36 g/dL, previously 0.44  g/dL.      08/11/2023 REVIEWED  Final     Comment:       Electronically reviewed and signed by:  Destiny Lopes M.D.  Signed on 08/15/23 at 13:16  Normal total protein.   Normal gamma globulins are decreased.   There is a paraprotein band in gamma = 0.44 g/dL, previously 0.29   g/dL.        Pathologist Interpretation NATALY   Date Value Ref Range Status   10/12/2023 REVIEWED  Final     Comment:       Electronically reviewed and signed by:  Shelli Haynes MD  Signed on 10/16/23 at 10:54  Closely adjacent IgG kappa specific monoclonal bands present.            Imaging:      Assessment:       Ms. Thompson is a  73 y.o. femal with relapsed multiple myeloma.     Plan:     MM s/p Auto SCT  - high risk MM with 17p deletion  - 2 year 8 months  s/p Auto SCT  - started maintenance q2w velcade 5/6/20, serial biochemical relapse  Noted and  given this and high risk CG , transitioned to Sravanthi/Noble (1mg/m2) /dex  Salvage July 2021. Due to uncontrolled hyperglycemia dex stopped after C4.    - she demonstrated mild biochemical progression over summer 2022 studies with no CRAB, in light of this and high risk CG  we transitioned her therapy from Sravanthi/Velcade to Sravanthi/pomalyst; intolerant to dex.   - biochemical studies from 10/12/23 cw with continued NM so plan to continue sravanthi/pom until intolerance or progression   - Pomalyst dose decreased to  to 2mg (5/19/23) due to persistent neutropenia  now improved   - supportive meds: continue ppx acyclovir, asa; resume maintenance zometa q 3 months    - recommended she take prn imodium for occasional loose stool with pomalyst   - post transplant vaccines completed     Anemia due to chemotherapy/JESSE  - Due to therapy and JESSE  - Continue venofer q month when comes for her sravanthi   - Improvement in hgb following iron infusion. No recent ferritin level. Added iron studies to next lab draw  Neuropathy  - Stable   - continues gabapentin and prn tramadol     HTN  - continue norvasc  - Patient to  monitor BP closely    Dementia/anxiety  - contniue fu with neuro/psych;  defer medication mgmt to them     Type II DM  - Managed by PCP      FU:     -cbc, cmp, serum free light chains, quantitative immunoglobulins, serum electropheresis, serum immunofixation and kenia injection and venofer infusion  in 4 weeks   -same labs, MD appt, kenia injection/venofer infusion in 8 weeks   - zometa infusion in 3 month    BMT Chart Routing      Follow up with physician .  in 2 month   Follow up with CHRISTY    Provider visit type    Infusion scheduling note   -same labs, MD appt, kenia injection//venofer infusion in 8 weeks Zometa infusion in 3 month   Injection scheduling note cbc, cmp, serum free light chains, quantitative immunoglobulins, serum electropheresis, serum immunofixation, hep B, Ferritin and kenia injection and venofer infusion  in 4 weeks   Labs    Imaging    Pharmacy appointment    Other referrals                  Adina Wright NP  Hematology/Oncology/BMT

## 2023-11-09 NOTE — PLAN OF CARE
Patient tolerated Sravanthi SQ, Venofer, and Zometa with no complications. VSS. Pt instructed to call MD with any problems. Pt discharged home independently.

## 2023-11-14 ENCOUNTER — HOSPITAL ENCOUNTER (OUTPATIENT)
Dept: RADIOLOGY | Facility: HOSPITAL | Age: 73
Discharge: HOME | End: 2023-11-14
Attending: INTERNAL MEDICINE
Payer: MEDICARE

## 2023-11-14 DIAGNOSIS — I71.23 ANEURYSM OF DESCENDING THORACIC AORTA WITHOUT RUPTURE: ICD-10-CM

## 2023-11-14 PROCEDURE — 71275 CTA CHEST AORTA NON CORONARY: ICD-10-PCS | Mod: 26,HCNC,, | Performed by: STUDENT IN AN ORGANIZED HEALTH CARE EDUCATION/TRAINING PROGRAM

## 2023-11-14 PROCEDURE — 71275 CT ANGIOGRAPHY CHEST: CPT | Mod: 26,HCNC,, | Performed by: STUDENT IN AN ORGANIZED HEALTH CARE EDUCATION/TRAINING PROGRAM

## 2023-11-14 PROCEDURE — 71275 CT ANGIOGRAPHY CHEST: CPT | Mod: TC,HCNC

## 2023-11-14 PROCEDURE — 25500020 PHARM REV CODE 255: Mod: HCNC | Performed by: INTERNAL MEDICINE

## 2023-11-14 RX ADMIN — IOHEXOL 100 ML: 350 INJECTION, SOLUTION INTRAVENOUS at 02:11

## 2023-11-16 ENCOUNTER — TELEPHONE (OUTPATIENT)
Dept: INTERNAL MEDICINE | Facility: CLINIC | Age: 73
End: 2023-11-16
Payer: MEDICARE

## 2023-11-17 NOTE — TELEPHONE ENCOUNTER
CTA reviewed----  Stable appearance of saccular aneurysmal dilatation at the distal thoracic aorta near the level of diaphragm with associated mural thrombus not significantly changed.

## 2023-11-21 DIAGNOSIS — T50.905A DRUG-INDUCED CYTOPENIA: ICD-10-CM

## 2023-11-21 DIAGNOSIS — D75.9 DRUG-INDUCED CYTOPENIA: ICD-10-CM

## 2023-11-21 DIAGNOSIS — Z94.84 HISTORY OF AUTO STEM CELL TRANSPLANT: Primary | ICD-10-CM

## 2023-11-21 DIAGNOSIS — C90.00 MULTIPLE MYELOMA, REMISSION STATUS UNSPECIFIED: ICD-10-CM

## 2023-11-21 NOTE — TELEPHONE ENCOUNTER
----- Message from Argentina Gentile sent at 11/21/2023  2:10 PM CST -----  Type:  RX Refill Request     Who Called: Ashleigh from [facility] insurance     Refill or New Rx: refill     RX Name and Strength: pomalidomide 2 mg Cap    How is the patient currently taking it? (ex. 1XDay): TAKE 1 CAPSULE (2MG) BY MOUTH EVERY DAY on days 1-21 of a 28 day cycle.   Is this a 30 day or 90 day RX: 30 days     Preferred Pharmacy with phone number: [facility] [address]   Phone: [phone]  Fax: [phone]      Local or Mail Order: mail     Would the patient rather a call back or a response via [facility]? Call back     Best Call Back Number: [phone]    Additional Information: need new Auth number for medication

## 2023-12-07 ENCOUNTER — OFFICE VISIT (OUTPATIENT)
Dept: HEMATOLOGY/ONCOLOGY | Facility: CLINIC | Age: 73
End: 2023-12-07
Payer: MEDICARE

## 2023-12-07 ENCOUNTER — INFUSION (OUTPATIENT)
Dept: INFUSION THERAPY | Facility: HOSPITAL | Age: 73
End: 2023-12-07
Payer: MEDICARE

## 2023-12-07 VITALS
BODY MASS INDEX: 23.9 KG/M2 | HEART RATE: 62 BPM | WEIGHT: 129.88 LBS | TEMPERATURE: 98 F | HEIGHT: 62 IN | OXYGEN SATURATION: 97 % | SYSTOLIC BLOOD PRESSURE: 125 MMHG | DIASTOLIC BLOOD PRESSURE: 69 MMHG | RESPIRATION RATE: 16 BRPM

## 2023-12-07 VITALS
TEMPERATURE: 98 F | OXYGEN SATURATION: 97 % | RESPIRATION RATE: 16 BRPM | SYSTOLIC BLOOD PRESSURE: 126 MMHG | DIASTOLIC BLOOD PRESSURE: 61 MMHG | HEART RATE: 59 BPM

## 2023-12-07 DIAGNOSIS — Z79.4 TYPE 2 DIABETES MELLITUS WITH DIABETIC PERIPHERAL ANGIOPATHY WITHOUT GANGRENE, WITH LONG-TERM CURRENT USE OF INSULIN: ICD-10-CM

## 2023-12-07 DIAGNOSIS — J44.9 CHRONIC OBSTRUCTIVE PULMONARY DISEASE, UNSPECIFIED COPD TYPE: ICD-10-CM

## 2023-12-07 DIAGNOSIS — E11.69 HYPERLIPIDEMIA ASSOCIATED WITH TYPE 2 DIABETES MELLITUS: ICD-10-CM

## 2023-12-07 DIAGNOSIS — Z94.84 HISTORY OF AUTO STEM CELL TRANSPLANT: ICD-10-CM

## 2023-12-07 DIAGNOSIS — D84.9 IMMUNOCOMPROMISED: ICD-10-CM

## 2023-12-07 DIAGNOSIS — Z94.81 STATUS POST AUTOLOGOUS BONE MARROW TRANSPLANT: ICD-10-CM

## 2023-12-07 DIAGNOSIS — C90.01 MULTIPLE MYELOMA IN REMISSION: ICD-10-CM

## 2023-12-07 DIAGNOSIS — F06.70 MILD NEUROCOGNITIVE DISORDER DUE TO MULTIPLE ETIOLOGIES: Primary | ICD-10-CM

## 2023-12-07 DIAGNOSIS — E11.51 TYPE 2 DIABETES MELLITUS WITH DIABETIC PERIPHERAL ANGIOPATHY WITHOUT GANGRENE, WITH LONG-TERM CURRENT USE OF INSULIN: ICD-10-CM

## 2023-12-07 DIAGNOSIS — C90.02 MULTIPLE MYELOMA IN RELAPSE: Primary | ICD-10-CM

## 2023-12-07 DIAGNOSIS — E78.5 HYPERLIPIDEMIA ASSOCIATED WITH TYPE 2 DIABETES MELLITUS: ICD-10-CM

## 2023-12-07 PROCEDURE — 96365 THER/PROPH/DIAG IV INF INIT: CPT | Mod: HCNC

## 2023-12-07 PROCEDURE — 3078F DIAST BP <80 MM HG: CPT | Mod: HCNC,CPTII,S$GLB, | Performed by: INTERNAL MEDICINE

## 2023-12-07 PROCEDURE — 3074F PR MOST RECENT SYSTOLIC BLOOD PRESSURE < 130 MM HG: ICD-10-PCS | Mod: HCNC,CPTII,S$GLB, | Performed by: INTERNAL MEDICINE

## 2023-12-07 PROCEDURE — 63600175 PHARM REV CODE 636 W HCPCS: Mod: JZ,JG,HCNC | Performed by: INTERNAL MEDICINE

## 2023-12-07 PROCEDURE — 1159F PR MEDICATION LIST DOCUMENTED IN MEDICAL RECORD: ICD-10-PCS | Mod: HCNC,CPTII,S$GLB, | Performed by: INTERNAL MEDICINE

## 2023-12-07 PROCEDURE — 99999 PR PBB SHADOW E&M-EST. PATIENT-LVL IV: ICD-10-PCS | Mod: PBBFAC,HCNC,, | Performed by: INTERNAL MEDICINE

## 2023-12-07 PROCEDURE — 1159F MED LIST DOCD IN RCRD: CPT | Mod: HCNC,CPTII,S$GLB, | Performed by: INTERNAL MEDICINE

## 2023-12-07 PROCEDURE — 1126F PR PAIN SEVERITY QUANTIFIED, NO PAIN PRESENT: ICD-10-PCS | Mod: HCNC,CPTII,S$GLB, | Performed by: INTERNAL MEDICINE

## 2023-12-07 PROCEDURE — 96401 CHEMO ANTI-NEOPL SQ/IM: CPT | Mod: HCNC

## 2023-12-07 PROCEDURE — 3288F FALL RISK ASSESSMENT DOCD: CPT | Mod: HCNC,CPTII,S$GLB, | Performed by: INTERNAL MEDICINE

## 2023-12-07 PROCEDURE — 99214 OFFICE O/P EST MOD 30 MIN: CPT | Mod: HCNC,S$GLB,, | Performed by: INTERNAL MEDICINE

## 2023-12-07 PROCEDURE — 3044F PR MOST RECENT HEMOGLOBIN A1C LEVEL <7.0%: ICD-10-PCS | Mod: HCNC,CPTII,S$GLB, | Performed by: INTERNAL MEDICINE

## 2023-12-07 PROCEDURE — 99999 PR PBB SHADOW E&M-EST. PATIENT-LVL IV: CPT | Mod: PBBFAC,HCNC,, | Performed by: INTERNAL MEDICINE

## 2023-12-07 PROCEDURE — 3078F PR MOST RECENT DIASTOLIC BLOOD PRESSURE < 80 MM HG: ICD-10-PCS | Mod: HCNC,CPTII,S$GLB, | Performed by: INTERNAL MEDICINE

## 2023-12-07 PROCEDURE — 1126F AMNT PAIN NOTED NONE PRSNT: CPT | Mod: HCNC,CPTII,S$GLB, | Performed by: INTERNAL MEDICINE

## 2023-12-07 PROCEDURE — 25000003 PHARM REV CODE 250: Mod: HCNC | Performed by: INTERNAL MEDICINE

## 2023-12-07 PROCEDURE — 1101F PT FALLS ASSESS-DOCD LE1/YR: CPT | Mod: HCNC,CPTII,S$GLB, | Performed by: INTERNAL MEDICINE

## 2023-12-07 PROCEDURE — 3008F BODY MASS INDEX DOCD: CPT | Mod: HCNC,CPTII,S$GLB, | Performed by: INTERNAL MEDICINE

## 2023-12-07 PROCEDURE — 4010F ACE/ARB THERAPY RXD/TAKEN: CPT | Mod: HCNC,CPTII,S$GLB, | Performed by: INTERNAL MEDICINE

## 2023-12-07 PROCEDURE — 3288F PR FALLS RISK ASSESSMENT DOCUMENTED: ICD-10-PCS | Mod: HCNC,CPTII,S$GLB, | Performed by: INTERNAL MEDICINE

## 2023-12-07 PROCEDURE — 99214 PR OFFICE/OUTPT VISIT, EST, LEVL IV, 30-39 MIN: ICD-10-PCS | Mod: HCNC,S$GLB,, | Performed by: INTERNAL MEDICINE

## 2023-12-07 PROCEDURE — 3074F SYST BP LT 130 MM HG: CPT | Mod: HCNC,CPTII,S$GLB, | Performed by: INTERNAL MEDICINE

## 2023-12-07 PROCEDURE — 3044F HG A1C LEVEL LT 7.0%: CPT | Mod: HCNC,CPTII,S$GLB, | Performed by: INTERNAL MEDICINE

## 2023-12-07 PROCEDURE — 96367 TX/PROPH/DG ADDL SEQ IV INF: CPT | Mod: HCNC

## 2023-12-07 PROCEDURE — 1101F PR PT FALLS ASSESS DOC 0-1 FALLS W/OUT INJ PAST YR: ICD-10-PCS | Mod: HCNC,CPTII,S$GLB, | Performed by: INTERNAL MEDICINE

## 2023-12-07 PROCEDURE — 3008F PR BODY MASS INDEX (BMI) DOCUMENTED: ICD-10-PCS | Mod: HCNC,CPTII,S$GLB, | Performed by: INTERNAL MEDICINE

## 2023-12-07 PROCEDURE — 4010F PR ACE/ARB THEARPY RXD/TAKEN: ICD-10-PCS | Mod: HCNC,CPTII,S$GLB, | Performed by: INTERNAL MEDICINE

## 2023-12-07 RX ORDER — SODIUM CHLORIDE 0.9 % (FLUSH) 0.9 %
10 SYRINGE (ML) INJECTION
Status: CANCELLED | OUTPATIENT
Start: 2023-12-09

## 2023-12-07 RX ORDER — EPINEPHRINE 0.3 MG/.3ML
0.3 INJECTION SUBCUTANEOUS ONCE AS NEEDED
Status: DISCONTINUED | OUTPATIENT
Start: 2023-12-07 | End: 2023-12-07 | Stop reason: HOSPADM

## 2023-12-07 RX ORDER — HEPARIN 100 UNIT/ML
500 SYRINGE INTRAVENOUS
Status: CANCELLED | OUTPATIENT
Start: 2023-12-09

## 2023-12-07 RX ORDER — DIPHENHYDRAMINE HYDROCHLORIDE 50 MG/ML
50 INJECTION INTRAMUSCULAR; INTRAVENOUS ONCE AS NEEDED
Status: DISCONTINUED | OUTPATIENT
Start: 2023-12-07 | End: 2023-12-07 | Stop reason: HOSPADM

## 2023-12-07 RX ORDER — SODIUM CHLORIDE 0.9 % (FLUSH) 0.9 %
10 SYRINGE (ML) INJECTION
Status: DISCONTINUED | OUTPATIENT
Start: 2023-12-07 | End: 2023-12-07 | Stop reason: HOSPADM

## 2023-12-07 RX ORDER — DIPHENHYDRAMINE HYDROCHLORIDE 50 MG/ML
50 INJECTION INTRAMUSCULAR; INTRAVENOUS ONCE AS NEEDED
Status: CANCELLED | OUTPATIENT
Start: 2023-12-11 | End: 2035-05-08

## 2023-12-07 RX ORDER — EPINEPHRINE 0.3 MG/.3ML
0.3 INJECTION SUBCUTANEOUS ONCE AS NEEDED
Status: CANCELLED | OUTPATIENT
Start: 2023-12-11 | End: 2035-05-08

## 2023-12-07 RX ORDER — HEPARIN 100 UNIT/ML
500 SYRINGE INTRAVENOUS
Status: DISCONTINUED | OUTPATIENT
Start: 2023-12-07 | End: 2023-12-07 | Stop reason: HOSPADM

## 2023-12-07 RX ADMIN — SODIUM CHLORIDE: 9 INJECTION, SOLUTION INTRAVENOUS at 10:12

## 2023-12-07 RX ADMIN — DARATUMUMAB AND HYALURONIDASE-FIHJ (HUMAN RECOMBINANT) 1800 MG: 1800; 30000 INJECTION SUBCUTANEOUS at 11:12

## 2023-12-07 RX ADMIN — ZOLEDRONIC ACID 3.3 MG: 4 INJECTION, SOLUTION, CONCENTRATE INTRAVENOUS at 10:12

## 2023-12-07 NOTE — PLAN OF CARE
1130-Pt tolerated Zometa infusion and Sravanthi SQ well, no complications or side effects, POC and meds discussed with pt, pt aware of upcoming appts, pt knows to call MD with any questions or concerns. Pt ambulated off unit, no distress noted.

## 2023-12-07 NOTE — PROGRESS NOTES
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  She achieved and tolerated well VRd x completing 7, 28 day cycles and achieving deep VGPR to induction. Then underwent Melphalan autologous stem cell transplant on 2/12/2020.      Extensive PMH as below.    2 prior CVAs (one in 2008, one in 2011)  L breast cancer DCIS stage 0 (s/p lumpectomy and radiation, no endocrine tx due to CVA)  HTN  DM II  Neuropathy, b/l hands  Prior smoker, quit in 2011  Hepatic lesions - vascular per recent MRI in 09 /2019 with no changes; will confirm no further rascon needed from help  Statin Intolerance - on praluent, PCSK9 inhibitor  HFpEF - EF 55%, diastolic dysfunction  Anxiety/Depression     ADMISSION SUMMARY: 2/10-2/26/2020  Admitted 2/10, tolerated chemo and transplant well. Engrafted on day +12. Remained afebrile throughout hospital stay and no mucositis. Experienced expected side effects of nausea and diarrhea controlled with antiemetics and Imodium. Discharged home with home PT/OT       Subjective:       Initial History: Ms. Thompson is a 73 y.o. female who presents for  follow up.  She is 3 yrs 9  months post AutoSCT. Relapsed  at approximately  1 year post sct and was on Sravanthi/zhanna/dex until summer 2022 when changed to sravanthi/pom/dex due to biochemical only progression. Dex stopped due to mood issues.  Remains on sravanthi/pom/dex salvage. Tolerating dose adjusted pomalyst.  Per friend accompanying  Her dementia has slightly worsened and now accompanied by hallucinations.  They have established neurologist.     Tolerating iron sucrose for her JESSE; injectafer and feraheme were  refused by her insurance.   Allergies:  Review of patient's allergies indicates:   Allergen Reactions    Lipitor [atorvastatin] Itching     Itching, elevated cpk, muscle aches, statin induced myositis       Medications:  Current Outpatient Medications   Medication Sig Dispense Refill    acyclovir (ZOVIRAX) 400 MG tablet TAKE 1 TABLET BY MOUTH TWICE A  tablet 3    albuterol (ACCUNEB) 1.25 mg/3 mL Nebu Take 3 mLs (1.25 mg total) by nebulization every 6 (six) hours as needed (wheeze/cough). Rescue 75 mL 2    amLODIPine (NORVASC) 5 MG tablet TAKE 1 TABLET BY MOUTH EVERY DAY 90 tablet 2    aspirin (ECOTRIN) 81 MG EC tablet TAKE 1 TABLET BY MOUTH EVERY DAY 90 tablet 3    b complex vitamins (B COMPLEX-VITAMIN B12) tablet Take 1 tablet by mouth once daily. 30 tablet 3    cholecalciferol, vitamin D3, 125 mcg (5,000 unit) Tab 1 tablet DAILY (route: oral)      cyanocobalamin, vitamin B-12, 5,000 mcg Subl Place one under tongue once a day for 1 month, then continue to take under tongue per week 30 tablet 3    DARZALEX FASPRO 1,800 mg-30,000 unit/15 mL Soln       ENGERIX-B, PF, 20 mcg/mL Syrg       evolocumab (REPATHA SURECLICK) 140 mg/mL PnIj Inject 1 mL (140 mg total) into the skin every 14 (fourteen) days. 6 mL 3    ferrous gluconate 324 mg (37.5 mg iron) Tab tablet 1 tablet DAILY (route: oral)      gabapentin (NEURONTIN) 300 MG capsule Take 1 capsule (300 mg total) by mouth 2 (two) times daily. 180 capsule 2    GADAVIST 1 mmol/mL (604.72 mg/mL) Soln injection       loperamide (IMODIUM) 2 mg capsule TAKE 1 CAPSULE BY MOUTH 4 TIMES DAILY AS NEEDED FOR DIARRHEA. 60 capsule 0    metFORMIN (GLUCOPHAGE) 1000 MG tablet Take 1 tablet (1,000 mg total) by mouth 2 (two) times daily with meals. 180 tablet 3    omeprazole (PRILOSEC) 40 MG capsule TAKE 1 CAPSULE BY MOUTH EVERY DAY 90 capsule 1    OXYGEN-AIR DELIVERY SYSTEMS MISC 1-2 Liter O2 - CONTINUOUS (route: Oxygen)      pomalidomide 2 mg Cap TAKE 1 CAPSULE (2MG) BY MOUTH EVERY  DAY on days 1-21 of a 28 day cycle.  [facility] [id number]  11/24/23. 21 capsule 0    potassium chloride SA (K-DUR,KLOR-CON) 20 MEQ tablet       sertraline (ZOLOFT) 50 MG tablet TAKE 1 TABLET BY MOUTH EVERY DAY 90 tablet 3    suvorexant (BELSOMRA) 10 mg Tab Take 1 tablet by mouth every evening. 30 tablet 3    valsartan (DIOVAN) 160 MG tablet Take 1 tablet (160 mg total) by mouth once daily. 90 tablet 3    VENOFER 100 mg iron/5 mL injection       zoledronic 4 mg/100 mL PgBk infusion       zoledronic acid (ZOMETA) 4 mg/5 mL injection        No current facility-administered medications for this visit.      See HPI     ECOG Performance Status: 2 (due to remote  CVA)   Objective:      Vitals:   Vitals:    12/07/23 0920   BP: 125/69   Pulse: 62   Resp: 16   Temp: 97.5 °F (36.4 °C)     General - somewhat frail appearing, no apparent distress  HEENT - oropharynx clear  Chest and Lung - clear to auscultation bilaterally   Cardiovascular - RRR with no MGR, normal S1 and S2  Abdomen-  soft, nontender, no palpable hepatomegaly or splenomegaly  Lymph - no palpable lymphadenopathy  Heme - no bruising, petechiae, pallor  Skin - no rashes or lesions  Psych - appropriate mood and affect     Component      Latest Ref Rng 11/9/2023 12/7/2023   WBC      3.90 - 12.70 K/uL  3.33 (L)    RBC      4.00 - 5.40 M/uL  4.28    Hemoglobin      12.0 - 16.0 g/dL  10.8 (L)    Hematocrit      37.0 - 48.5 %  35.5 (L)    MCV      82 - 98 fL  83    MCH      27.0 - 31.0 pg  25.2 (L)    MCHC      32.0 - 36.0 g/dL  30.4 (L)    RDW      11.5 - 14.5 %  18.6 (H)    Platelet Count      150 - 450 K/uL  242    MPV      9.2 - 12.9 fL  11.6    Immature Granulocytes      0.0 - 0.5 %  0.3    Gran # (ANC)      1.8 - 7.7 K/uL  2.0    Immature Grans (Abs)      0.00 - 0.04 K/uL  0.01    Lymph #      1.0 - 4.8 K/uL  0.9 (L)    Mono #      0.3 - 1.0 K/uL  0.3    Eos #      0.0 - 0.5 K/uL  0.2    Baso #      0.00 - 0.20 K/uL  0.06    nRBC      0 /100 WBC  0    Gran %      38.0 -  73.0 %  58.9    Lymph %      18.0 - 48.0 %  27.0    Mono %      4.0 - 15.0 %  7.5    Eosinophil %      0.0 - 8.0 %  4.5    Basophil %      0.0 - 1.9 %  1.8    Differential Method  Automated    Sodium      136 - 145 mmol/L  145    Potassium      3.5 - 5.1 mmol/L  3.9    Chloride      95 - 110 mmol/L  106    CO2      23 - 29 mmol/L  25    Glucose      70 - 110 mg/dL  78    BUN      8 - 23 mg/dL  10    Creatinine      0.5 - 1.4 mg/dL  1.0    Calcium      8.7 - 10.5 mg/dL  9.7    PROTEIN TOTAL      6.0 - 8.4 g/dL  7.4    Albumin      3.5 - 5.2 g/dL  3.8    BILIRUBIN TOTAL      0.1 - 1.0 mg/dL  0.6    ALP      55 - 135 U/L  46 (L)    AST      10 - 40 U/L  10    ALT      10 - 44 U/L  7 (L)    eGFR      >60 mL/min/1.73 m^2  59.1 !    Anion Gap      8 - 16 mmol/L  14    Crawford Free Light Chains      0.33 - 1.94 mg/dL 9.27 (H)     Lambda Free Light Chains      0.57 - 2.63 mg/dL 1.91     Kappa/Lambda FLC Ratio      0.26 - 1.65  4.85 (H)        11/9/23 SPEP: Normal total protein. Normal gamma globulins are decreased. Paraprotein peak in gamma = 0.3 g/dL (previously, 0.44 g/dL) and a closely adjacent, previously not seen faint linear irregularity in   mid-gamma = 0.2 g/dL. Correlate with corresponding NATALY results.     11/9/23 sIFE: Two closely adjacent IgG kappa specific monoclonal protein bands at the beta-2/near-gamma junction and in gamma are identified.   Imaging:      Assessment:       Ms. Thompson is a  73 y.o. femal with relapsed multiple myeloma.     Plan:     1. MM s/p Auto SCT  - high risk MM with 17p deletion  - 3  year 9 months  s/p Auto SCT  - started maintenance q2w velcade 5/6/20   - serial biochemical relapse  Noted and  given this and high risk CG , transitioned to Sravanthi/Noble (1mg/m2) /dex  Salvage July 2021    - Due to uncontrolled hyperglycemia dex stopped after C4.    -supportive meds:   continue ppx acyclovir, asa; resume maintenance zometa q 3 months    -she  demonstrated mild biochemical progression over  "summer 2022 studies with no CRAB, in light of this and high risk CG   we transitioned her therapy from Sravanthi/Velcade to Sravanthi/pomalyst; intolerant to dex.     -Pomalyst dose decreased to  to 2mg (5/19/23) due to persistent neutropenia  now improved   -- biochemical studies from 7/14/23 cw with continued WA so plan to continue sravanthi/pom until intolerance or progression   -recommended she take prn imodium for occasional loose stool with pomalyst   -already on asa 81mg   - SPEP on 11/9/23 shows a paraprotein peak in gamma = 0.3 g/dL (previously, 0.44 g/dL) and a closely adjacent, previously not seen faint linear irregularity in mid-gamma = 0.2 g/dL.   -sFLC ratio stable, 4.85 on 11/9/23  -post transplant vaccines completed     2. Anemia due to chemotherapy/JESSE  Due to therapy and JESSE  Continue venofer q month when comes for her sravanthi     3. Neuropathy  -Stable   - continues gabapentin and prn tramadol     4. SOB/descending aortic aneurysm  - CTA and dopplers ordered urgently with high concern for DVT/PE; completed 8/3/20  - incidental descending thoracic aortic aneurysm noted; referred to thoracic surgery; seen 8/7/20; per note: "at current size would not recommend any intervention as risk of rupture remains low.  Recommend surveillance CT chest every 12 months to monitor growth of the aneurysm.  Would typically recommend aspirin 81 mg daily in patients with aortic aneurysm  - ASA started (9/14/20)     5. HTN  - continue norvasc  - Patient to monitor BP closely    6. Dementia/anxiety  -worsened with hallucinatoins; will notify neurologist   - contniue fu with neuro/psych;  defer medication mgmt to them           -mgmt of chronic CV/dm2 per pcp            BMT Chart Routing      Follow up with physician 4 weeks. follow up with    Follow up with CHRISTY    Provider visit type    Infusion scheduling note   zomet atoday and in 4 wks   Injection scheduling note chemo today and in 4 wks   Labs CBC, CMP, immunoglobulins, " SPEP, free light chains, magnesium and phosphorus   Scheduling:  Preferred lab:  Lab interval:     Imaging    Pharmacy appointment    Other referrals

## 2023-12-13 ENCOUNTER — HOSPITAL ENCOUNTER (EMERGENCY)
Facility: HOSPITAL | Age: 73
Discharge: HOME | End: 2023-12-13
Attending: STUDENT IN AN ORGANIZED HEALTH CARE EDUCATION/TRAINING PROGRAM
Payer: MEDICARE

## 2023-12-13 VITALS
BODY MASS INDEX: 23.74 KG/M2 | RESPIRATION RATE: 37 BRPM | SYSTOLIC BLOOD PRESSURE: 133 MMHG | HEIGHT: 62 IN | OXYGEN SATURATION: 95 % | HEART RATE: 49 BPM | DIASTOLIC BLOOD PRESSURE: 60 MMHG | TEMPERATURE: 98 F | WEIGHT: 129 LBS

## 2023-12-13 DIAGNOSIS — J32.0 MAXILLARY SINUSITIS, UNSPECIFIED CHRONICITY: ICD-10-CM

## 2023-12-13 DIAGNOSIS — R51.9 NONINTRACTABLE HEADACHE, UNSPECIFIED CHRONICITY PATTERN, UNSPECIFIED HEADACHE TYPE: Primary | ICD-10-CM

## 2023-12-13 LAB
ALBUMIN SERPL BCP-MCNC: 3.7 G/DL (ref 3.5–5.2)
ALP SERPL-CCNC: 49 U/L (ref 55–135)
ALT SERPL W/O P-5'-P-CCNC: 8 U/L (ref 10–44)
ANION GAP SERPL CALC-SCNC: 14 MMOL/L (ref 8–16)
AST SERPL-CCNC: 10 U/L (ref 10–40)
BASOPHILS # BLD AUTO: 0.03 K/UL (ref 0–0.2)
BASOPHILS NFR BLD: 0.7 % (ref 0–1.9)
BILIRUB SERPL-MCNC: 0.5 MG/DL (ref 0.1–1)
BUN SERPL-MCNC: 8 MG/DL (ref 8–23)
CALCIUM SERPL-MCNC: 9.6 MG/DL (ref 8.7–10.5)
CHLORIDE SERPL-SCNC: 104 MMOL/L (ref 95–110)
CO2 SERPL-SCNC: 25 MMOL/L (ref 23–29)
CREAT SERPL-MCNC: 0.9 MG/DL (ref 0.5–1.4)
DIFFERENTIAL METHOD: ABNORMAL
EOSINOPHIL # BLD AUTO: 0.2 K/UL (ref 0–0.5)
EOSINOPHIL NFR BLD: 5.6 % (ref 0–8)
ERYTHROCYTE [DISTWIDTH] IN BLOOD BY AUTOMATED COUNT: 18.2 % (ref 11.5–14.5)
EST. GFR  (NO RACE VARIABLE): >60 ML/MIN/1.73 M^2
GLUCOSE SERPL-MCNC: 93 MG/DL (ref 70–110)
HCT VFR BLD AUTO: 33.2 % (ref 37–48.5)
HGB BLD-MCNC: 10.7 G/DL (ref 12–16)
IMM GRANULOCYTES # BLD AUTO: 0.01 K/UL (ref 0–0.04)
IMM GRANULOCYTES NFR BLD AUTO: 0.2 % (ref 0–0.5)
LYMPHOCYTES # BLD AUTO: 1 K/UL (ref 1–4.8)
LYMPHOCYTES NFR BLD: 23.3 % (ref 18–48)
MCH RBC QN AUTO: 25.5 PG (ref 27–31)
MCHC RBC AUTO-ENTMCNC: 32.2 G/DL (ref 32–36)
MCV RBC AUTO: 79 FL (ref 82–98)
MONOCYTES # BLD AUTO: 0.4 K/UL (ref 0.3–1)
MONOCYTES NFR BLD: 9.6 % (ref 4–15)
NEUTROPHILS # BLD AUTO: 2.5 K/UL (ref 1.8–7.7)
NEUTROPHILS NFR BLD: 60.6 % (ref 38–73)
NRBC BLD-RTO: 0 /100 WBC
PLATELET # BLD AUTO: 174 K/UL (ref 150–450)
PMV BLD AUTO: ABNORMAL FL (ref 9.2–12.9)
POTASSIUM SERPL-SCNC: 3.3 MMOL/L (ref 3.5–5.1)
PROT SERPL-MCNC: 7.2 G/DL (ref 6–8.4)
RBC # BLD AUTO: 4.2 M/UL (ref 4–5.4)
SARS-COV-2 RDRP RESP QL NAA+PROBE: NEGATIVE
SODIUM SERPL-SCNC: 143 MMOL/L (ref 136–145)
WBC # BLD AUTO: 4.08 K/UL (ref 3.9–12.7)

## 2023-12-13 PROCEDURE — 85025 COMPLETE CBC W/AUTO DIFF WBC: CPT | Mod: HCNC | Performed by: STUDENT IN AN ORGANIZED HEALTH CARE EDUCATION/TRAINING PROGRAM

## 2023-12-13 PROCEDURE — 25000003 PHARM REV CODE 250: Mod: HCNC | Performed by: STUDENT IN AN ORGANIZED HEALTH CARE EDUCATION/TRAINING PROGRAM

## 2023-12-13 PROCEDURE — 80053 COMPREHEN METABOLIC PANEL: CPT | Mod: HCNC | Performed by: STUDENT IN AN ORGANIZED HEALTH CARE EDUCATION/TRAINING PROGRAM

## 2023-12-13 PROCEDURE — 99284 EMERGENCY DEPT VISIT MOD MDM: CPT | Mod: 25,HCNC

## 2023-12-13 PROCEDURE — U0002 COVID-19 LAB TEST NON-CDC: HCPCS | Mod: HCNC | Performed by: STUDENT IN AN ORGANIZED HEALTH CARE EDUCATION/TRAINING PROGRAM

## 2023-12-13 RX ORDER — ACETAMINOPHEN 325 MG/1
650 TABLET ORAL
Status: COMPLETED | OUTPATIENT
Start: 2023-12-13 | End: 2023-12-13

## 2023-12-13 RX ORDER — AMOXICILLIN AND CLAVULANATE POTASSIUM 875; 125 MG/1; MG/1
1 TABLET, FILM COATED ORAL 2 TIMES DAILY
Qty: 14 TABLET | Refills: 0 | Status: SHIPPED | OUTPATIENT
Start: 2023-12-13

## 2023-12-13 RX ADMIN — ACETAMINOPHEN 650 MG: 325 TABLET ORAL at 12:12

## 2023-12-13 NOTE — ED TRIAGE NOTES
Shelby Thompson, a 73 y.o. female presents to the ED w/ complaint of headache. Pt states she has been having a headache to the left side of her head for the past two days. Pt has a history of dementia and is also taking oral chemo for multiple myeloma. Pt denies any nausea or vomiting or dizziness but does stated she has some slight blurry vision.     Triage note:  Chief Complaint   Patient presents with    Headache     Hx dementia, pain to L side of head 2 d, on oral chemo for mult myeloma     Review of patient's allergies indicates:   Allergen Reactions    Lipitor [atorvastatin] Itching     Itching, elevated cpk, muscle aches, statin induced myositis     Past Medical History:   Diagnosis Date    Acute respiratory failure with hypoxia 4/30/2019    FUENTES (acute kidney injury) 5/1/2019    Allergy     Anemia     Aortic aneurysm     Breast cancer 10/2011    left breast Stage 0 DCIS    Cataract     Chronic diastolic congestive heart failure 11/6/2015    Colon polyp     Community acquired pneumonia of right lower lobe of lung 8/3/2021    GERD (gastroesophageal reflux disease)     Hiatal hernia     History of colonic polyps     Hx of psychiatric care     Zoloft    HX: breast cancer     Hyperlipemia     Hypertension     ICH (intracerebral hemorrhage)     Immunocompromised 10/28/2022    Major depressive disorder, single episode, mild 6/23/2016    Nuclear sclerosis 7/21/2014    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 7/21/2014    PAD (peripheral artery disease) 11/6/2015    Psychiatric problem     Statin-induced rhabdomyolysis     4/2015     Stroke 4/2011    Type 2 diabetes mellitus with diabetic peripheral angiopathy without gangrene, with long-term current use of insulin 3/31/2021    Type 2 diabetes mellitus without complication, without long-term current use of insulin 2/10/2020    Walker as ambulation aid

## 2023-12-13 NOTE — DISCHARGE INSTRUCTIONS
You were seen today for a left-sided headache.  Your head CT today shows that you have opacities within your left maxillary sinus cavity.  This could be contributing to your headache.  We will go ahead and treat you for sinusitis with an antibiotic called Augmentin.  Please take this as prescribed.  A referral for ENT has also been placed for you today.  If you do not receive a call in the next 24-48 hours to schedule an appointment, you can call the number listed in this discharge paperwork.  Please also follow up with your primary care doctor within the next week.  If you redevelop headache or you have any other concerns or new symptoms like vision changes, leg or arm weakness or numbness, fever, you should return to the emergency department for re-evaluation.

## 2023-12-13 NOTE — ED PROVIDER NOTES
Encounter Date: 12/13/2023       History     Chief Complaint   Patient presents with    Headache     Hx dementia, pain to L side of head 2 d, on oral chemo for mult myeloma     HPI  Patient is a 73-year-old female with history of dementia, diastolic heart failure, hypertension, DMII, hyperlipidemia, prior ICH, multiple myeloma s/p auto SCT with relapse on chemotherapy who presents for headache.  History obtained from both the patient and the patient's  at bedside.  Onset of headache 2 days ago.  Patient reports the pain is left sided described as frontal and temporal and behind her left eye.  No vision changes.  No pain with chewing.  No fevers or chills.  No neck pain or stiffness.  She has not taken anything at home for the pain.  No recent trauma.  No new extremity weakness or numbness.  Moderate severity.  No sudden onset.  Her  also reports she has had some mild congestion over the past week that is new.  No sore throat, cough, abdominal pain, nausea, vomiting, urinary symptoms, fever or chills.    Review of patient's allergies indicates:   Allergen Reactions    Lipitor [atorvastatin] Itching     Itching, elevated cpk, muscle aches, statin induced myositis     Past Medical History:   Diagnosis Date    Acute respiratory failure with hypoxia 4/30/2019    FUENTES (acute kidney injury) 5/1/2019    Allergy     Anemia     Aortic aneurysm     Breast cancer 10/2011    left breast Stage 0 DCIS    Cataract     Chronic diastolic congestive heart failure 11/6/2015    Colon polyp     Community acquired pneumonia of right lower lobe of lung 8/3/2021    GERD (gastroesophageal reflux disease)     Hiatal hernia     History of colonic polyps     Hx of psychiatric care     Zoloft    HX: breast cancer     Hyperlipemia     Hypertension     ICH (intracerebral hemorrhage)     Immunocompromised 10/28/2022    Major depressive disorder, single episode, mild 6/23/2016    Nuclear sclerosis 7/21/2014    Open angle with borderline  findings and low 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 7/21/2014    PAD (peripheral artery disease) 11/6/2015    Psychiatric problem     Statin-induced rhabdomyolysis     4/2015     Stroke 4/2011    Type 2 diabetes mellitus with diabetic peripheral angiopathy without gangrene, with long-term current use of insulin 3/31/2021    Type 2 diabetes mellitus without complication, without long-term current use of insulin 2/10/2020    Walker as ambulation aid      Past Surgical History:   Procedure Laterality Date    APPENDECTOMY      BONE MARROW BIOPSY Left 10/3/2019    Procedure: Biopsy-bone marrow;  Surgeon: Jere Tineo MD;  [facility];  Service: Oncology;  Laterality: Left;    BREAST BIOPSY Left 10/2011    BREAST LUMPECTOMY Left 2011    DCIS    COLONOSCOPY      COLONOSCOPY N/A 1/9/2020    Procedure: COLONOSCOPY;  Surgeon: Quang De La Cruz MD;  Location: [facility] (4TH FLR);  Service: Endoscopy;  Laterality: N/A;    CYST REMOVAL      back    ESOPHAGOGASTRODUODENOSCOPY  07/2016    duodenal angioectasia    ESOPHAGOGASTRODUODENOSCOPY N/A 1/9/2020    Procedure: EGD (ESOPHAGOGASTRODUODENOSCOPY);  Surgeon: Quang De La Cruz MD;  Location: [facility] ENDO (4TH FLR);  Service: Endoscopy;  Laterality: N/A;    FOOT FRACTURE SURGERY  10/2012    right foot, with R hallux valgus repair    FRACTURE SURGERY Right     broken toe repiar    HEMORRHOID SURGERY      LIVER BIOPSY  04/2015    essentially normal, no fibrosis    TUBAL LIGATION      UPPER GASTROINTESTINAL ENDOSCOPY       Family History   Problem Relation Age of Onset    Dementia Sister         x1 sister    Cancer Sister 63        Lung Cancer    No Known Problems Mother     Cancer Father     No Known Problems Brother     Hypertension Daughter     Fibroids Daughter         uterine    Hypertension Son     No Known Problems Brother     No Known Problems Maternal Aunt     No Known Problems Maternal Uncle     No Known Problems Paternal Aunt     No Known Problems Paternal Uncle      Hypertension Maternal Grandmother     No Known Problems Maternal Grandfather     No Known Problems Paternal Grandmother     No Known Problems Paternal Grandfather     Ovarian cancer Neg Hx     Colon cancer Neg Hx     Tremor Neg Hx     Amblyopia Neg Hx     Blindness Neg Hx     Cataracts Neg Hx     Diabetes Neg Hx     Glaucoma Neg Hx     Macular degeneration Neg Hx     Retinal detachment Neg Hx     Strabismus Neg Hx     Stroke Neg Hx     Thyroid disease Neg Hx     Esophageal cancer Neg Hx     Rectal cancer Neg Hx     Stomach cancer Neg Hx     Ulcerative colitis Neg Hx     Crohn's disease Neg Hx     Irritable bowel syndrome Neg Hx     Celiac disease Neg Hx      Social History     Tobacco Use    Smoking status: Former     Current packs/day: 0.00     Average packs/day: 0.3 packs/day for 44.2 years (11.1 ttl pk-yrs)     Types: Cigarettes     Start date:      Quit date: 2011     Years since quittin.7    Smokeless tobacco: Never   Substance Use Topics    Alcohol use: Not Currently     Comment: none since 2019, very rare wine     Drug use: No     Review of Systems  A full review of systems is obtained, see HPI for pertinent positives.    Physical Exam     Initial Vitals [23 1143]   BP Pulse Resp Temp SpO2   118/79 60 18 98.7 °F (37.1 °C) 98 %      MAP       --         Physical Exam  Constitutional: No acute distress, awake and alert  HENT:  Normocephalic, atraumatic, moist mucous membranes, no facial asymmetry, nares patent  Eyes: Extraocular movements intact, pupils 2 mm, equal round and reactive, no pain with eye movements, sclera normal  Neck:  Supple, normal range of motion, no rigidity  Respiratory: Non-labored  Cardiovascular: Well perfused, normal rate, regular rhythm  Gastrointestinal: Soft, non-tender, non-distended  Integumentary: Warm and dry  Musculoskeletal: No deformity  Neurological: Awake and alert, 5/5 motor and sensation light touch intact in all extremities, cranial nerves 2-12  grossly intact  Psychiatric: Cooperative     ED Course   Procedures  Labs Reviewed   CBC W/ AUTO DIFFERENTIAL - Abnormal; Notable for the following components:       Result Value    Hemoglobin 10.7 (*)     Hematocrit 33.2 (*)     MCV 79 (*)     MCH 25.5 (*)     RDW 18.2 (*)     All other components within normal limits   COMPREHENSIVE METABOLIC PANEL - Abnormal; Notable for the following components:    Potassium 3.3 (*)     Alkaline Phosphatase 49 (*)     ALT 8 (*)     All other components within normal limits   SARS-COV-2 RNA AMPLIFICATION, QUAL          Imaging Results              CT Head Without Contrast (Final result)  Result time 12/13/23 15:08:08      Final result by Tom Castillo MD (12/13/23 15:08:08)                   Impression:      No acute intracranial process.  Chronic ischemic changes.    Age indeterminate opacification left maxillary sinus and anterior ethmoid air cells however new from 2022.      Electronically signed by: Tom Castillo  Date:    12/13/2023  Time:    15:08               Narrative:    EXAMINATION:  CT HEAD WITHOUT CONTRAST    CLINICAL HISTORY:  Headache, new or worsening (Age >= 50y);    TECHNIQUE:  Low dose axial images were obtained through the head.  Coronal and sagittal reformations were also performed. Contrast was not administered.    COMPARISON:  MRI 02/03/2022 CT head 05/01/2019    FINDINGS:  No evidence of hydrocephalus, mass effect, intracranial hemorrhage or acute territorial infarct.    Chronic left thalamic and right basal ganglia lacunar infarcts.    Decreased attenuation in the periventricular white matter is nonspecific but may reflect mild to moderate chronic small vessel ischemic change, similar in appearance to the prior.    The calvarium is intact.  Opacification of the left maxillary sinus with left ethmoid mucosal thickening new from 2022.    Chronic deformity medial wall right orbit.                                       Medications   acetaminophen tablet  650 mg (650 mg Oral Given 12/13/23 1254)     Medical Decision Making  Patient is a 73-year-old female who presents for left-sided headache.  History of multiple myeloma on current chemotherapy.  She is immunocompromised but does not show any signs of meningismus.  She does have a history of prior ICH.  She has not on any blood thinners currently.  No history of trauma.  Head CT ordered to rule out ICH verses metastatic disease versus other intracranial etiologies for headache.  Also check basic labs and treat symptomatically here.  Dispo pending labs, imaging and re-evaluation.    Labs and imaging reviewed.  Patient does have age indeterminate opacification of the left maxillary sinus and anterior ethmoid air cells.  Will treat for maxillary sinusitis.  This could be causing the patient to have the left-sided headaches she is describing.  On re-evaluation, the patient's headache resolved with Tylenol.  She remains neurologically intact.  Will send home with Augmentin and provide ENT referral.  Her family was also counseled on return precautions.  She was advised to try Tylenol at home for recurrent headache but was given strict return precautions at time of discharge.    Amount and/or Complexity of Data Reviewed  Labs: ordered.  Radiology: ordered.    Risk  OTC drugs.  Prescription drug management.               ED Course as of 12/13/23 2157   Wed Dec 13, 2023   1523 Impression:     No acute intracranial process.  Chronic ischemic changes.     Age indeterminate opacification left maxillary sinus and anterior ethmoid air cells however new from 2022.   [NN]   1638 Headache resolved [NN]      ED Course User Index  [NN] Suni Knight MD                           Clinical Impression:  Final diagnoses:  [R51.9] Nonintractable headache, unspecified chronicity pattern, unspecified headache type (Primary)  [J32.0] Maxillary sinusitis, unspecified chronicity          ED Disposition Condition    Discharge Stable           ED Prescriptions       Medication Sig Dispense Start Date End Date Auth. Provider    amoxicillin-clavulanate 875-125mg (AUGMENTIN) 875-125 mg per tablet Take 1 tablet by mouth 2 (two) times daily. 14 tablet 12/13/2023 -- Suni Knight MD          Follow-up Information       Follow up With Specialties Details Why Contact Info Additional Information    Emanuel Arevalo Jr., MD Internal Medicine Schedule an appointment as soon as possible for a visit   [address]  [place] [address]  [phone]       [facility] - Emergency Dept Emergency Medicine  As needed, If symptoms worsen [address] [facility] [id number] [phone]     [facility] - [address] Otolaryngology Schedule an appointment as soon as possible for a visit   [address] [facility] [id number] [phone] Ear, Nose & Throat Services - Main [facility], 4th Floor Please park in [facility] and use Clinic elevator             Suni Knight MD  12/13/23 0671

## 2023-12-14 ENCOUNTER — PATIENT OUTREACH (OUTPATIENT)
Dept: EMERGENCY MEDICINE | Facility: HOSPITAL | Age: 73
End: 2023-12-14
Payer: MEDICARE

## 2023-12-22 DIAGNOSIS — D75.9 DRUG-INDUCED CYTOPENIA: ICD-10-CM

## 2023-12-22 DIAGNOSIS — C90.00 MULTIPLE MYELOMA, REMISSION STATUS UNSPECIFIED: ICD-10-CM

## 2023-12-22 DIAGNOSIS — T50.905A DRUG-INDUCED CYTOPENIA: ICD-10-CM

## 2023-12-22 NOTE — TELEPHONE ENCOUNTER
----- Message from Kristy Huffman sent at 12/22/2023 10:55 AM CST -----  Regarding: pt refill  Can the clinic reply in [facility]: no         Please refill the medication(s) listed below.         Please call the patient when the prescription(s) is ready for  at this phone number  Telephone Information:MARISOL TOUSSAINT [[id number]]  Mobile          [phone]              Medication #1 pomalidomide 2 mg Cap      Medication #2       Preferred Pharmacy:   [facility] - [facility] [address]  [address] [address]  Phone: [phone] Fax: [phone]

## 2024-01-11 DIAGNOSIS — I73.9 PAD (PERIPHERAL ARTERY DISEASE): ICD-10-CM

## 2024-01-11 DIAGNOSIS — Z78.9 STATIN INTOLERANCE: Primary | ICD-10-CM

## 2024-01-12 ENCOUNTER — INFUSION (OUTPATIENT)
Dept: INFUSION THERAPY | Facility: HOSPITAL | Age: 74
End: 2024-01-12
Payer: MEDICARE

## 2024-01-12 ENCOUNTER — OFFICE VISIT (OUTPATIENT)
Dept: HEMATOLOGY/ONCOLOGY | Facility: CLINIC | Age: 74
End: 2024-01-12
Payer: MEDICARE

## 2024-01-12 VITALS
OXYGEN SATURATION: 96 % | HEIGHT: 62 IN | DIASTOLIC BLOOD PRESSURE: 86 MMHG | WEIGHT: 129.75 LBS | TEMPERATURE: 98 F | HEART RATE: 76 BPM | BODY MASS INDEX: 23.88 KG/M2 | SYSTOLIC BLOOD PRESSURE: 127 MMHG | RESPIRATION RATE: 18 BRPM

## 2024-01-12 VITALS
BODY MASS INDEX: 23.88 KG/M2 | RESPIRATION RATE: 16 BRPM | HEIGHT: 62 IN | WEIGHT: 129.75 LBS | HEART RATE: 57 BPM | OXYGEN SATURATION: 96 % | SYSTOLIC BLOOD PRESSURE: 133 MMHG | TEMPERATURE: 98 F | DIASTOLIC BLOOD PRESSURE: 77 MMHG

## 2024-01-12 DIAGNOSIS — D50.0 IRON DEFICIENCY ANEMIA DUE TO CHRONIC BLOOD LOSS: ICD-10-CM

## 2024-01-12 DIAGNOSIS — Z94.84 HISTORY OF AUTO STEM CELL TRANSPLANT: ICD-10-CM

## 2024-01-12 DIAGNOSIS — Z79.899 IMMUNODEFICIENCY DUE TO DRUGS: ICD-10-CM

## 2024-01-12 DIAGNOSIS — D84.821 IMMUNODEFICIENCY DUE TO DRUGS: ICD-10-CM

## 2024-01-12 DIAGNOSIS — E87.8 ELECTROLYTE ABNORMALITY: ICD-10-CM

## 2024-01-12 DIAGNOSIS — C90.02 MULTIPLE MYELOMA IN RELAPSE: Primary | ICD-10-CM

## 2024-01-12 DIAGNOSIS — C90.00 MULTIPLE MYELOMA, REMISSION STATUS UNSPECIFIED: Primary | ICD-10-CM

## 2024-01-12 PROCEDURE — 3072F LOW RISK FOR RETINOPATHY: CPT | Mod: HCNC,CPTII,S$GLB, | Performed by: INTERNAL MEDICINE

## 2024-01-12 PROCEDURE — 99999 PR PBB SHADOW E&M-EST. PATIENT-LVL III: CPT | Mod: PBBFAC,HCNC,, | Performed by: INTERNAL MEDICINE

## 2024-01-12 PROCEDURE — 1126F AMNT PAIN NOTED NONE PRSNT: CPT | Mod: HCNC,CPTII,S$GLB, | Performed by: INTERNAL MEDICINE

## 2024-01-12 PROCEDURE — 96401 CHEMO ANTI-NEOPL SQ/IM: CPT | Mod: HCNC

## 2024-01-12 PROCEDURE — 99215 OFFICE O/P EST HI 40 MIN: CPT | Mod: HCNC,S$GLB,, | Performed by: INTERNAL MEDICINE

## 2024-01-12 PROCEDURE — 3075F SYST BP GE 130 - 139MM HG: CPT | Mod: HCNC,CPTII,S$GLB, | Performed by: INTERNAL MEDICINE

## 2024-01-12 PROCEDURE — 63600175 PHARM REV CODE 636 W HCPCS: Mod: JZ,JG,HCNC | Performed by: INTERNAL MEDICINE

## 2024-01-12 PROCEDURE — 3078F DIAST BP <80 MM HG: CPT | Mod: HCNC,CPTII,S$GLB, | Performed by: INTERNAL MEDICINE

## 2024-01-12 PROCEDURE — 3008F BODY MASS INDEX DOCD: CPT | Mod: HCNC,CPTII,S$GLB, | Performed by: INTERNAL MEDICINE

## 2024-01-12 RX ORDER — EPINEPHRINE 0.3 MG/.3ML
0.3 INJECTION SUBCUTANEOUS ONCE AS NEEDED
Status: DISCONTINUED | OUTPATIENT
Start: 2024-01-12 | End: 2024-01-12 | Stop reason: HOSPADM

## 2024-01-12 RX ORDER — DIPHENHYDRAMINE HYDROCHLORIDE 50 MG/ML
50 INJECTION, SOLUTION INTRAMUSCULAR; INTRAVENOUS ONCE AS NEEDED
Status: DISCONTINUED | OUTPATIENT
Start: 2024-01-12 | End: 2024-01-12 | Stop reason: HOSPADM

## 2024-01-12 RX ORDER — EVOLOCUMAB 140 MG/ML
140 INJECTION, SOLUTION SUBCUTANEOUS
Qty: 6 ML | Refills: 3 | Status: ACTIVE | OUTPATIENT
Start: 2024-01-12

## 2024-01-12 RX ORDER — LANOLIN ALCOHOL/MO/W.PET/CERES
800 CREAM (GRAM) TOPICAL 2 TIMES DAILY
Qty: 120 TABLET | Refills: 1 | Status: SHIPPED | OUTPATIENT
Start: 2024-01-12 | End: 2024-02-08

## 2024-01-12 RX ORDER — POTASSIUM CHLORIDE 20 MEQ/1
20 TABLET, EXTENDED RELEASE ORAL DAILY
Qty: 30 TABLET | Refills: 11 | Status: SHIPPED | OUTPATIENT
Start: 2024-01-12 | End: 2025-01-11

## 2024-01-12 RX ORDER — DIPHENHYDRAMINE HYDROCHLORIDE 50 MG/ML
50 INJECTION INTRAMUSCULAR; INTRAVENOUS ONCE AS NEEDED
Status: CANCELLED | OUTPATIENT
Start: 2024-01-14

## 2024-01-12 RX ORDER — EPINEPHRINE 0.3 MG/.3ML
0.3 INJECTION SUBCUTANEOUS ONCE AS NEEDED
Status: CANCELLED | OUTPATIENT
Start: 2024-01-14

## 2024-01-12 RX ADMIN — DARATUMUMAB AND HYALURONIDASE-FIHJ (HUMAN RECOMBINANT) 1800 MG: 1800; 30000 INJECTION SUBCUTANEOUS at 11:01

## 2024-01-12 NOTE — PLAN OF CARE
Pt tolerated Darzalex Faspro well. No adverse reaction noted. Pt education reinforced on chemo regimen, side effects, what to expect, and when to call Dr. Tineo. Pt verbalized understanding. I reviewed pt calendar w/ pt and understanding verbalized.

## 2024-01-12 NOTE — Clinical Note
-cbc, cmp, serum free light chains, quantitative immunoglobulins, serum electropheresis, serum immunofixation and kenia injection on 2/9 -same labs, MD appt , and kenia injection on 3/5

## 2024-01-12 NOTE — PROGRESS NOTES
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  She achieved and tolerated well VRd x completing 7, 28 day cycles and achieving deep VGPR to induction. Then underwent Melphalan autologous stem cell transplant on 2/12/2020.      Extensive PMH as below.    2 prior CVAs (one in 2008, one in 2011)  L breast cancer DCIS stage 0 (s/p lumpectomy and radiation, no endocrine tx due to CVA)  HTN  DM II  Neuropathy, b/l hands  Prior smoker, quit in 2011  Hepatic lesions - vascular per recent MRI in 09 /2019 with no changes; will confirm no further rascon needed from help  Statin Intolerance - on praluent, PCSK9 inhibitor  HFpEF - EF 55%, diastolic dysfunction  Anxiety/Depression     ADMISSION SUMMARY: 2/10-2/26/2020  Admitted 2/10, tolerated chemo and transplant well. Engrafted on day +12. Remained afebrile throughout hospital stay and no mucositis. Experienced expected side effects of nausea and diarrhea controlled with antiemetics and Imodium. Discharged home with home PT/OT       Subjective:       Initial History: Ms. Thompson is a 73 y.o. female who presents for  follow up.  She is 3 yrs 11  months post AutoSCT. Relapsed  at approximately  1 year post sct and was on Sravanthi/zhanna/dex until summer 2022 when changed to sravanthi/pom/dex due to biochemical only progression. Dex stopped due to mood issues.  Remains on sravanthi/pom/dex salvage. Tolerating dose adjusted pomalyst.  Per friend accompanying  Her dementia is stable from last appt.  They have established neurologist.     Tolerated  iron sucrose for her JESSE; injectafer and feraheme were refused by her insurance.    Allergies:  Review of patient's allergies indicates:   Allergen Reactions    Lipitor [atorvastatin] Itching     Itching, elevated cpk, muscle aches, statin induced myositis       Medications:  Current Outpatient Medications   Medication Sig Dispense Refill    acyclovir (ZOVIRAX) 400 MG tablet TAKE 1 TABLET BY MOUTH TWICE A  tablet 3    albuterol (ACCUNEB) 1.25 mg/3 mL Nebu Take 3 mLs (1.25 mg total) by nebulization every 6 (six) hours as needed (wheeze/cough). Rescue 75 mL 2    amLODIPine (NORVASC) 5 MG tablet TAKE 1 TABLET BY MOUTH EVERY DAY 90 tablet 2    amoxicillin-clavulanate 875-125mg (AUGMENTIN) 875-125 mg per tablet Take 1 tablet by mouth 2 (two) times daily. 14 tablet 0    aspirin (ECOTRIN) 81 MG EC tablet TAKE 1 TABLET BY MOUTH EVERY DAY 90 tablet 3    b complex vitamins (B COMPLEX-VITAMIN B12) tablet Take 1 tablet by mouth once daily. 30 tablet 3    cholecalciferol, vitamin D3, 125 mcg (5,000 unit) Tab 1 tablet DAILY (route: oral)      cyanocobalamin, vitamin B-12, 5,000 mcg Subl Place one under tongue once a day for 1 month, then continue to take under tongue per week 30 tablet 3    DARZALEX FASPRO 1,800 mg-30,000 unit/15 mL Soln       ENGERIX-B, PF, 20 mcg/mL Syrg       evolocumab (REPATHA SURECLICK) 140 mg/mL PnIj Inject 1 mL (140 mg total) into the skin every 14 (fourteen) days. 6 mL 3    ferrous gluconate 324 mg (37.5 mg iron) Tab tablet 1 tablet DAILY (route: oral)      gabapentin (NEURONTIN) 300 MG capsule Take 1 capsule (300 mg total) by mouth 2 (two) times daily. 180 capsule 2    GADAVIST 1 mmol/mL (604.72 mg/mL) Soln injection       loperamide (IMODIUM) 2 mg capsule TAKE 1 CAPSULE BY MOUTH 4 TIMES DAILY AS NEEDED FOR DIARRHEA. 60 capsule 0    metFORMIN (GLUCOPHAGE) 1000 MG tablet Take 1 tablet (1,000 mg total) by mouth 2 (two) times daily with meals. 180 tablet 3    omeprazole (PRILOSEC) 40 MG capsule TAKE 1 CAPSULE BY MOUTH EVERY DAY 90 capsule 1    OXYGEN-AIR DELIVERY SYSTEMS MISC 1-2  Liter O2 - CONTINUOUS (route: Oxygen)      pomalidomide 2 mg Cap TAKE 1 CAPSULE (2MG) BY MOUTH EVERY DAY on days 1-21 of a 28 day cycle.  [facility] [id number]  12/22/23. 21 capsule 0    potassium chloride SA (K-DUR,KLOR-CON) 20 MEQ tablet       sertraline (ZOLOFT) 50 MG tablet TAKE 1 TABLET BY MOUTH EVERY DAY 90 tablet 3    suvorexant (BELSOMRA) 10 mg Tab Take 1 tablet by mouth every evening. 30 tablet 3    valsartan (DIOVAN) 160 MG tablet Take 1 tablet (160 mg total) by mouth once daily. 90 tablet 3    VENOFER 100 mg iron/5 mL injection       zoledronic 4 mg/100 mL PgBk infusion       zoledronic acid (ZOMETA) 4 mg/5 mL injection        No current facility-administered medications for this visit.      See HPI     ECOG Performance Status: 2 (due to remote  CVA)   Objective:      Vitals:   Vitals:    01/12/24 0940   BP: 133/77   Pulse: (!) 57   Resp: 16   Temp: 97.8 °F (36.6 °C)     General - somewhat frail appearing, no apparent distress  HEENT - oropharynx clear  Chest and Lung - clear to auscultation bilaterally   Cardiovascular - RRR with no MGR, normal S1 and S2  Abdomen-  soft, nontender, no palpable hepatomegaly or splenomegaly  Lymph - no palpable lymphadenopathy  Heme - no bruising, petechiae, pallor  Skin - no rashes or lesions  Psych - appropriate mood and affect      Lab Results   Component Value Date    WBC 3.68 (L) 01/12/2024    HGB 10.6 (L) 01/12/2024    HCT 33.6 (L) 01/12/2024    MCV 84 01/12/2024     (L) 01/12/2024       Gran # (ANC)   Date Value Ref Range Status   01/12/2024 2.3 1.8 - 7.7 K/uL Final     Gran %   Date Value Ref Range Status   01/12/2024 61.1 38.0 - 73.0 % Final     CMP  Sodium   Date Value Ref Range Status   01/12/2024 146 (H) 136 - 145 mmol/L Final     Potassium   Date Value Ref Range Status   01/12/2024 2.9 (L) 3.5 - 5.1 mmol/L Final     Chloride   Date Value Ref Range Status   01/12/2024 104 95 - 110 mmol/L Final     CO2   Date Value Ref Range Status   01/12/2024 29 23 - 29 mmol/L  Final     Glucose   Date Value Ref Range Status   01/12/2024 77 70 - 110 mg/dL Final     BUN   Date Value Ref Range Status   01/12/2024 11 8 - 23 mg/dL Final     Creatinine   Date Value Ref Range Status   01/12/2024 1.2 0.5 - 1.4 mg/dL Final     Calcium   Date Value Ref Range Status   01/12/2024 9.7 8.7 - 10.5 mg/dL Final     Total Protein   Date Value Ref Range Status   01/12/2024 7.1 6.0 - 8.4 g/dL Final     Albumin   Date Value Ref Range Status   01/12/2024 3.7 3.5 - 5.2 g/dL Final     Total Bilirubin   Date Value Ref Range Status   01/12/2024 0.7 0.1 - 1.0 mg/dL Final     Comment:     For infants and newborns, interpretation of results should be based  on gestational age, weight and in agreement with clinical  observations.    Premature Infant recommended reference ranges:  Up to 24 hours.............<8.0 mg/dL  Up to 48 hours............<12.0 mg/dL  3-5 days..................<15.0 mg/dL  6-29 days.................<15.0 mg/dL       Alkaline Phosphatase   Date Value Ref Range Status   01/12/2024 52 (L) 55 - 135 U/L Final     AST   Date Value Ref Range Status   01/12/2024 11 10 - 40 U/L Final     ALT   Date Value Ref Range Status   01/12/2024 6 (L) 10 - 44 U/L Final     Anion Gap   Date Value Ref Range Status   01/12/2024 13 8 - 16 mmol/L Final     eGFR   Date Value Ref Range Status   01/12/2024 47.5 (A) >60 mL/min/1.73 m^2 Final     Lab Results   Component Value Date    UIBC 372 (H) 07/13/2012    IRON 49 04/30/2019    TRANSFERRIN 202 04/30/2019    TIBC 299 04/30/2019    FESATURATED 16 (L) 04/30/2019      Lab Results   Component Value Date    FERRITIN 201 01/12/2024       Assessment:       Ms. Thompson is a  73 y.o. femal with relapsed multiple myeloma.     Plan:     1. MM s/p Auto SCT  - high risk MM with 17p deletion  - 2  year 11  months  s/p Auto SCT  - started maintenance q2w velcade 5/6/20   - serial biochemical relapse  Noted and  given this and high risk CG , transitioned to Sravanthi/Noble (1mg/m2) /dex  Salvage  "July 2021    - Due to uncontrolled hyperglycemia dex stopped after C4.    -supportive meds:   continue ppx acyclovir, asa; resume maintenance zometa q 3 months ; next due early march 2024  -she  demonstrated mild biochemical progression over summer 2022 studies with no CRAB, in light of this and high risk CG   we transitioned her therapy from Sravanthi/Velcade to Sravanthi/pomalyst; intolerant to dex.     -Pomalyst dose decreased to  to 2mg (5/19/23) due to persistent neutropenia  now improved   -- biochemical studies from  1/12/24 cw with continued PA so plan to continue sravanthi/pom until intolerance or progression      -recommended she take prn imodium for occasional loose stool with pomalyst   -already on asa 81mg   -post transplant vaccines completed   -continue q 4 week labs/sravanthi and q 8 week MD appt     2. Anemia due to chemotherapy/JESSE  Due to therapy and JESSE  Completed venofer course dec 2023; monitor iron studie q 2 months for repeat iron needs     3. Neuropathy  -Stable   - continues gabapentin and prn tramadol     4. SOB/descending aortic aneurysm  - CTA and dopplers ordered urgently with high concern for DVT/PE; completed 8/3/20  - incidental descending thoracic aortic aneurysm noted; referred to thoracic surgery; seen 8/7/20; per note: "at current size would not recommend any intervention as risk of rupture remains low.  Recommend surveillance CT chest every 12 months to monitor growth of the aneurysm.  Would typically recommend aspirin 81 mg daily in patients with aortic aneurysm  - ASA started (9/14/20)     5. HTN  - continue norvasc  - Patient to monitor BP closely    6. Dementia/anxiety  -stable since last appt   -mgmt per neurology   - contniue fu with neuro/psych;  defer medication mgmt to them             FU:   -cbc, cmp, serum free light chains, quantitative immunoglobulins, serum electropheresis, serum immunofixation and sravanthi injection on 2/9  -same labs, MD appt , and sravanthi injection/zometa infusion  on " 3/5

## 2024-01-17 DIAGNOSIS — E11.9 TYPE 2 DIABETES MELLITUS WITHOUT COMPLICATION: ICD-10-CM

## 2024-01-22 ENCOUNTER — PATIENT MESSAGE (OUTPATIENT)
Dept: ADMINISTRATIVE | Facility: HOSPITAL | Age: 74
End: 2024-01-22
Payer: MEDICARE

## 2024-01-25 DIAGNOSIS — C90.00 MULTIPLE MYELOMA, REMISSION STATUS UNSPECIFIED: ICD-10-CM

## 2024-01-25 DIAGNOSIS — D75.9 DRUG-INDUCED CYTOPENIA: ICD-10-CM

## 2024-01-25 DIAGNOSIS — T50.905A DRUG-INDUCED CYTOPENIA: ICD-10-CM

## 2024-01-25 NOTE — TELEPHONE ENCOUNTER
"----- Message from Nancy Haynes sent at 1/25/2024  1:06 PM CST -----  Regarding: Pt advice  Contact: Brandyn Ahumada requesting a refill for the following meds pomalidomide 2 mg Cap ... Please call and adv @            Confirmed contact below:   Contact Name: Brandyn  Phone Number: [phone]               Additional Notes:  "Thank you for all that you do for our patients"                                           "

## 2024-01-26 ENCOUNTER — OFFICE VISIT (OUTPATIENT)
Dept: OTOLARYNGOLOGY | Facility: CLINIC | Age: 74
End: 2024-01-26
Payer: MEDICARE

## 2024-01-26 VITALS
WEIGHT: 132.94 LBS | HEART RATE: 57 BPM | DIASTOLIC BLOOD PRESSURE: 84 MMHG | SYSTOLIC BLOOD PRESSURE: 136 MMHG | BODY MASS INDEX: 24.46 KG/M2 | HEIGHT: 62 IN

## 2024-01-26 DIAGNOSIS — J34.3 HYPERTROPHY OF BOTH INFERIOR NASAL TURBINATES: ICD-10-CM

## 2024-01-26 DIAGNOSIS — J34.2 NASAL SEPTAL DEVIATION: ICD-10-CM

## 2024-01-26 DIAGNOSIS — J32.0 MAXILLARY SINUSITIS, UNSPECIFIED CHRONICITY: Primary | ICD-10-CM

## 2024-01-26 PROCEDURE — 3008F BODY MASS INDEX DOCD: CPT | Mod: HCNC,CPTII,S$GLB, | Performed by: STUDENT IN AN ORGANIZED HEALTH CARE EDUCATION/TRAINING PROGRAM

## 2024-01-26 PROCEDURE — 1160F RVW MEDS BY RX/DR IN RCRD: CPT | Mod: HCNC,CPTII,S$GLB, | Performed by: STUDENT IN AN ORGANIZED HEALTH CARE EDUCATION/TRAINING PROGRAM

## 2024-01-26 PROCEDURE — 1126F AMNT PAIN NOTED NONE PRSNT: CPT | Mod: HCNC,CPTII,S$GLB, | Performed by: STUDENT IN AN ORGANIZED HEALTH CARE EDUCATION/TRAINING PROGRAM

## 2024-01-26 PROCEDURE — 3079F DIAST BP 80-89 MM HG: CPT | Mod: HCNC,CPTII,S$GLB, | Performed by: STUDENT IN AN ORGANIZED HEALTH CARE EDUCATION/TRAINING PROGRAM

## 2024-01-26 PROCEDURE — 3072F LOW RISK FOR RETINOPATHY: CPT | Mod: HCNC,CPTII,S$GLB, | Performed by: STUDENT IN AN ORGANIZED HEALTH CARE EDUCATION/TRAINING PROGRAM

## 2024-01-26 PROCEDURE — 1159F MED LIST DOCD IN RCRD: CPT | Mod: HCNC,CPTII,S$GLB, | Performed by: STUDENT IN AN ORGANIZED HEALTH CARE EDUCATION/TRAINING PROGRAM

## 2024-01-26 PROCEDURE — 99203 OFFICE O/P NEW LOW 30 MIN: CPT | Mod: 25,HCNC,S$GLB, | Performed by: STUDENT IN AN ORGANIZED HEALTH CARE EDUCATION/TRAINING PROGRAM

## 2024-01-26 PROCEDURE — 99999 PR PBB SHADOW E&M-EST. PATIENT-LVL IV: CPT | Mod: PBBFAC,HCNC,, | Performed by: STUDENT IN AN ORGANIZED HEALTH CARE EDUCATION/TRAINING PROGRAM

## 2024-01-26 PROCEDURE — 3075F SYST BP GE 130 - 139MM HG: CPT | Mod: HCNC,CPTII,S$GLB, | Performed by: STUDENT IN AN ORGANIZED HEALTH CARE EDUCATION/TRAINING PROGRAM

## 2024-01-26 NOTE — PROGRESS NOTES
Otolaryngology - Head and Neck Surgery New Patient Visit    1/26/2024    Referring Provider: Suni Knight MD    No chief complaint on file.    History of Present Illness, Otolaryngology Specialty-Specific Exam, and Assessment and Plan:     Shelby Thompson is a 73 y.o. female who presents for evaluation of opacification of her left maxillary sinus, which was incidentally discovered on a head CT.  She was seen in the ER on 12/13/2023 after albert a headache.  CT scan showed opacification of the left maxillary sinus.  She was discharged home on 1 week of Augmentin.  She has a history of dementia in his accompanied today with a friend she has no real sinonasal complaints.  She reports that her headache has resolved.  She denies any purulent drainage, nasal obstruction, anosmia, epistaxis, rhinorrhea, or purulent nasal drainage. She has been treated with Augmentin in the past. She has never had allergy testing. She denies previous skullbase surgery. She denies snoring.     She had a  CTH  done on 12/13/23 which I reviewed along with the associated radiology report.    On exam today, the ears are normal. The oral cavity is clear. The neck is clear. The nasopharynx, hypopharynx, and larynx are normal. Nasal endoscopy reveals right septal deviation with spur. Hypertrophic inferior turbinates bilaterally. No nasal masses or nasal polyposis. No purulent drainage in the right middle meatus, the left middle meatus and uncinate process slightly edematous no overt purulent debris noted. Nasopharynx clear without lesions. Eustachian tubes WNL.     Impression today is incidental opacification of the left maxillary sinus possible chronic in nature. I have recommended that she obtain dedicated imaging of her sinuses.     Thank you for allowing us to participate in the care of your patient. We will continue to keep you informed of her progress.    Sincerely yours,    Tico Florentino MD      Objective     Physical  "Examination  Vitals -  height is 5' 2" (1.575 m) and weight is 60.3 kg (132 lb 15 oz). Her blood pressure is 136/84 and her pulse is 57 (abnormal).   Constitutional - General appearance: Normal. Ability to communicate: Normal.  Head & Face - Overall appearance, scars, masses: Normal. Palpation &/or percussion of face: Normal. Salivary glands: Normal. Facial strength: Normal  ENMT - Otoscopic exam: Normal. Assessment of hearing: Normal. External inspection: Normal. Nasal mucosa, septum, turbinates: Abnormal see exam details. Lips, teeth, gums: Normal. Oropharynx: Normal. Pharyngeal walls/pyriform sinus: Normal. Larynx: Normal. Nasopharynx: Normal  Neck - Neck: Normal. Thyroid: Normal  Lymphatic - Palpation of lymph nodes: Normal  Eyes - Ocular mobility: Normal  Respiratory - Inspection of Chest: Normal  Cardiovascular - Peripheral vascular system: Normal  Neurological/Psychiatric - Orientation: Normal    Review of Systems  A complete review of systems was obtained 01/26/2024 and reviewed.  The review of systems is negative for symptoms except as described above.    /84 (BP Location: Left arm, Patient Position: Sitting, BP Method: Medium (Automatic))   Pulse (!) 57   Ht 5' 2" (1.575 m)   Wt 60.3 kg (132 lb 15 oz)   LMP  (LMP Unknown)   BMI 24.31 kg/m²      Nasal Endoscopy:  1/26/2024    The use of diagnostic nasal endoscopy was considered medically necessary for the evaluation and visualization of the nasal anatomy for symptoms suggestive of nasal or sinus origin. Physical examination (including a nasal speculum evaluation) did not provide sufficient clinical information to establish a diagnosis, or symptoms did not improve or worsened following treatment.     The nasal cavity was decongested with topical 1% phenylephrine and anesthetized with 4% lidocaine.  A rigid 0-degree endoscope was introduced into the nasal cavity.    The patient was seated in the examination chair. After discussion of risks and " "benefits, a nasal endoscope was inserted into the nose the endoscope was passed along the left nasal floor to the nasopharynx. It was then passed between the middle and superior meatus, nasal turbinates, nasal septum, nasopharynx and sphenoethmoid region. The nasal endoscope was withdrawn and there was no complications. An identical procedure was performed on the right side. I was present for the entire procedure.The patient tolerated the above procedure well. The findings of this procedure can be found in the dictated note from 1/26/2024 visit.                                Data Reviewed    WBC (K/uL)   Date Value   01/12/2024 3.68 (L)     Eosinophil % (%)   Date Value   01/12/2024 3.3     Eos # (K/uL)   Date Value   01/12/2024 0.1     Platelets (K/uL)   Date Value   01/12/2024 148 (L)     Glucose (mg/dL)   Date Value   01/12/2024 77     No results found for: "IGE"    I independently reviewed the images of the CT head dated 12/13/23. Pertinent findings include right septal deviation. Opacification of left maxillary sinus. Narrowing of left OMC. Opacification of left anterior ethmoid cells. No other significant sinonasal opacificaiton. Hypertrophic inferior turbinates.   "

## 2024-02-04 DIAGNOSIS — E87.8 ELECTROLYTE ABNORMALITY: ICD-10-CM

## 2024-02-08 RX ORDER — LANOLIN ALCOHOL/MO/W.PET/CERES
CREAM (GRAM) TOPICAL
Qty: 360 TABLET | Refills: 1 | Status: SHIPPED | OUTPATIENT
Start: 2024-02-08

## 2024-02-09 ENCOUNTER — HOSPITAL ENCOUNTER (OUTPATIENT)
Dept: RADIOLOGY | Facility: HOSPITAL | Age: 74
Discharge: HOME | End: 2024-02-09
Attending: STUDENT IN AN ORGANIZED HEALTH CARE EDUCATION/TRAINING PROGRAM
Payer: MEDICARE

## 2024-02-09 ENCOUNTER — INFUSION (OUTPATIENT)
Dept: INFUSION THERAPY | Facility: HOSPITAL | Age: 74
End: 2024-02-09
Payer: MEDICARE

## 2024-02-09 VITALS
HEIGHT: 62 IN | SYSTOLIC BLOOD PRESSURE: 142 MMHG | RESPIRATION RATE: 18 BRPM | BODY MASS INDEX: 23.59 KG/M2 | HEART RATE: 57 BPM | DIASTOLIC BLOOD PRESSURE: 70 MMHG | TEMPERATURE: 98 F | WEIGHT: 128.19 LBS

## 2024-02-09 DIAGNOSIS — C90.02 MULTIPLE MYELOMA IN RELAPSE: Primary | ICD-10-CM

## 2024-02-09 DIAGNOSIS — J32.0 MAXILLARY SINUSITIS, UNSPECIFIED CHRONICITY: ICD-10-CM

## 2024-02-09 PROCEDURE — 96401 CHEMO ANTI-NEOPL SQ/IM: CPT | Mod: HCNC

## 2024-02-09 PROCEDURE — 70486 CT MAXILLOFACIAL W/O DYE: CPT | Mod: 26,HCNC,, | Performed by: STUDENT IN AN ORGANIZED HEALTH CARE EDUCATION/TRAINING PROGRAM

## 2024-02-09 PROCEDURE — 63600175 PHARM REV CODE 636 W HCPCS: Mod: JZ,JG,HCNC | Performed by: INTERNAL MEDICINE

## 2024-02-09 PROCEDURE — 70486 CT MAXILLOFACIAL W/O DYE: CPT | Mod: TC,HCNC

## 2024-02-09 RX ORDER — EPINEPHRINE 0.3 MG/.3ML
0.3 INJECTION SUBCUTANEOUS ONCE AS NEEDED
Status: DISCONTINUED | OUTPATIENT
Start: 2024-02-09 | End: 2024-02-09 | Stop reason: HOSPADM

## 2024-02-09 RX ORDER — DIPHENHYDRAMINE HYDROCHLORIDE 50 MG/ML
50 INJECTION INTRAMUSCULAR; INTRAVENOUS ONCE AS NEEDED
Status: CANCELLED | OUTPATIENT
Start: 2024-02-11 | End: 2035-07-09

## 2024-02-09 RX ORDER — EPINEPHRINE 0.3 MG/.3ML
0.3 INJECTION SUBCUTANEOUS ONCE AS NEEDED
Status: CANCELLED | OUTPATIENT
Start: 2024-02-11 | End: 2035-07-09

## 2024-02-09 RX ORDER — DIPHENHYDRAMINE HYDROCHLORIDE 50 MG/ML
50 INJECTION INTRAMUSCULAR; INTRAVENOUS ONCE AS NEEDED
Status: DISCONTINUED | OUTPATIENT
Start: 2024-02-09 | End: 2024-02-09 | Stop reason: HOSPADM

## 2024-02-09 RX ADMIN — DARATUMUMAB AND HYALURONIDASE-FIHJ (HUMAN RECOMBINANT) 1800 MG: 1800; 30000 INJECTION SUBCUTANEOUS at 09:02

## 2024-02-09 NOTE — PLAN OF CARE
0800-Labs , hx, and medications reviewed. Assessment completed. Discussed plan of care with patient. Patient in agreement. Chair reclined and warm blanket and snack offered.     
0916-Patient tolerated darzalex faspro injection  well. Discharged without complaints or S/S of adverse event. AVS given.  Instructed to call provider for any questions or concerns.     
DISPLAY PLAN FREE TEXT

## 2024-02-12 ENCOUNTER — TELEPHONE (OUTPATIENT)
Dept: HEMATOLOGY/ONCOLOGY | Facility: CLINIC | Age: 74
End: 2024-02-12
Payer: MEDICARE

## 2024-02-16 ENCOUNTER — TELEPHONE (OUTPATIENT)
Dept: HEMATOLOGY/ONCOLOGY | Facility: CLINIC | Age: 74
End: 2024-02-16
Payer: MEDICARE

## 2024-02-16 NOTE — TELEPHONE ENCOUNTER
Spoke w/ pt's spouse in regards to scheduling nutrition appt. Pt's spouse stated pt isn't eating much and pt drinks about 1 bottle of ensure in the evening. Pt's spouse approved to schedule appt w/ Ilya Chu RD on 3/5/24 @ 9:30AM prior to infusion. MA informed pt's spouse that clinic is located on the 3rd floor of the [facility].       [place], [place] ext [phone]

## 2024-02-19 ENCOUNTER — OFFICE VISIT (OUTPATIENT)
Dept: OTOLARYNGOLOGY | Facility: CLINIC | Age: 74
End: 2024-02-19
Payer: MEDICARE

## 2024-02-19 VITALS
HEART RATE: 56 BPM | SYSTOLIC BLOOD PRESSURE: 138 MMHG | WEIGHT: 126.13 LBS | BODY MASS INDEX: 23.06 KG/M2 | DIASTOLIC BLOOD PRESSURE: 85 MMHG

## 2024-02-19 DIAGNOSIS — F06.70 MILD NEUROCOGNITIVE DISORDER DUE TO MULTIPLE ETIOLOGIES: ICD-10-CM

## 2024-02-19 DIAGNOSIS — J32.9 CHRONIC SINUSITIS, UNSPECIFIED LOCATION: Primary | ICD-10-CM

## 2024-02-19 PROCEDURE — 1159F MED LIST DOCD IN RCRD: CPT | Mod: CPTII,S$GLB,, | Performed by: STUDENT IN AN ORGANIZED HEALTH CARE EDUCATION/TRAINING PROGRAM

## 2024-02-19 PROCEDURE — 3008F BODY MASS INDEX DOCD: CPT | Mod: CPTII,S$GLB,, | Performed by: STUDENT IN AN ORGANIZED HEALTH CARE EDUCATION/TRAINING PROGRAM

## 2024-02-19 PROCEDURE — 99999 PR PBB SHADOW E&M-EST. PATIENT-LVL II: CPT | Mod: PBBFAC,,, | Performed by: STUDENT IN AN ORGANIZED HEALTH CARE EDUCATION/TRAINING PROGRAM

## 2024-02-19 PROCEDURE — 3072F LOW RISK FOR RETINOPATHY: CPT | Mod: CPTII,S$GLB,, | Performed by: STUDENT IN AN ORGANIZED HEALTH CARE EDUCATION/TRAINING PROGRAM

## 2024-02-19 PROCEDURE — 3075F SYST BP GE 130 - 139MM HG: CPT | Mod: CPTII,S$GLB,, | Performed by: STUDENT IN AN ORGANIZED HEALTH CARE EDUCATION/TRAINING PROGRAM

## 2024-02-19 PROCEDURE — 1126F AMNT PAIN NOTED NONE PRSNT: CPT | Mod: CPTII,S$GLB,, | Performed by: STUDENT IN AN ORGANIZED HEALTH CARE EDUCATION/TRAINING PROGRAM

## 2024-02-19 PROCEDURE — 99213 OFFICE O/P EST LOW 20 MIN: CPT | Mod: S$GLB,,, | Performed by: STUDENT IN AN ORGANIZED HEALTH CARE EDUCATION/TRAINING PROGRAM

## 2024-02-19 PROCEDURE — 3079F DIAST BP 80-89 MM HG: CPT | Mod: CPTII,S$GLB,, | Performed by: STUDENT IN AN ORGANIZED HEALTH CARE EDUCATION/TRAINING PROGRAM

## 2024-02-19 NOTE — PROGRESS NOTES
Subjective:      Shelby is a 73 y.o. female who comes for follow-up of sinusitis.  Her last visit with me was on 1/26/2024.  She continues to do well, without any significant complaints this unfortunately suffer from dementia does not really contribute to her history.  Her she was accompanied today with her caretaker, as her  is at home dealing with his own medical issues.    On questioning she denies any purulent drainage or facial pressure or headaches.    Her current sinus regime consists of:  Nasal saline.    The assessment of quality and severity of symptoms as measured by the SNOT-22 score is deferred.     The patient's medications, allergies, past medical, surgical, social and family histories were reviewed and updated as appropriate.    A detailed review of systems was obtained with pertinent positives as per the above HPI, and otherwise negative.        Objective:     /85 (BP Location: Left arm, Patient Position: Sitting)   Pulse (!) 56   Wt 57.2 kg (126 lb 1.7 oz)   LMP  (LMP Unknown)   BMI 23.06 kg/m²        Constitutional:   Vital signs are normal. She appears well-developed. Normal speech.      Head:  Normocephalic and atraumatic.     Ears:    Right Ear: No drainage or tenderness. No middle ear effusion.   Left Ear: No drainage or tenderness.  No middle ear effusion.     Mouth/Throat  Oropharynx clear and moist without lesions or asymmetry and normal uvula midline. No trismus. No oropharyngeal exudate. Mirror exam not performed due to patient tolerance.  Mirror exam not performed due to patient tolerance.      Neck:  Neck normal without thyromegaly masses, asymmetry, normal tracheal structure, crepitus, and tenderness, thyroid normal and trachea normal.     Pulmonary/Chest:   Effort normal.     Psychiatric:   She has a normal mood and affect. Her speech is normal.     Skin:   No abrasions, lacerations, lesions, or rashes.       Procedure    None      Data Reviewed    WBC (K/uL)  "  Date Value   02/09/2024 3.17 (L)     Eosinophil % (%)   Date Value   02/09/2024 1.6     Eos # (K/uL)   Date Value   02/09/2024 0.1     Platelets (K/uL)   Date Value   02/09/2024 225     Glucose (mg/dL)   Date Value   02/09/2024 107     No results found for: "IGE"    I independently reviewed the images of the CT sinuses dated 2/9/24. Pertinent findings include ossification of the left maxillary sinus.  Sparing of the right maxillary sinus, bilateral frontal, bilateral ethmoids and bilateral sphenoid sinuses.  There was an air-fluid level within the left maxillary sinus consistent with in the left maxillary sinus associated with a chronic sinus infection.      Assessment:     1. Chronic sinusitis, unspecified location    2. Mild neurocognitive disorder due to multiple etiologies         Plan:     - I had a long conversation with the patient, friend, and her  who was on the phone.  I discussed that given her CT findings she likely still has a continued sinus infection.  We had a long discussion and ensure invasive intervention to treat her sinus infection.  - we discussed the risks benefits and alternatives to surgery.  We all agreed given her age and significant medical comorbidities that she was not likely a good elective surgical candidate at this time.  The patient's  and air great concern that undergoing a procedure under general anesthesia may cause significant detriment to the patient.  I believe the her insulin and likely is not causing any significant lifestyle changes given her current cognitive decline.  - with the patient and the  who was agreement with the plan.  We we will continue to observe this infection and I will see her back in 6 months.    Tico Florentino MD     "

## 2024-02-21 DIAGNOSIS — E11.9 TYPE 2 DIABETES MELLITUS WITHOUT COMPLICATION: ICD-10-CM

## 2024-02-26 ENCOUNTER — PATIENT MESSAGE (OUTPATIENT)
Dept: ADMINISTRATIVE | Facility: HOSPITAL | Age: 74
End: 2024-02-26
Payer: MEDICARE

## 2024-02-28 DIAGNOSIS — C90.00 MULTIPLE MYELOMA, REMISSION STATUS UNSPECIFIED: ICD-10-CM

## 2024-02-28 DIAGNOSIS — D75.9 DRUG-INDUCED CYTOPENIA: ICD-10-CM

## 2024-02-28 DIAGNOSIS — T50.905A DRUG-INDUCED CYTOPENIA: ICD-10-CM

## 2024-03-01 DIAGNOSIS — C90.00 MULTIPLE MYELOMA, REMISSION STATUS UNSPECIFIED: Primary | ICD-10-CM

## 2024-03-04 ENCOUNTER — INFUSION (OUTPATIENT)
Dept: INFUSION THERAPY | Facility: HOSPITAL | Age: 74
End: 2024-03-04
Payer: MEDICARE

## 2024-03-04 ENCOUNTER — OFFICE VISIT (OUTPATIENT)
Dept: HEMATOLOGY/ONCOLOGY | Facility: CLINIC | Age: 74
End: 2024-03-04
Payer: MEDICARE

## 2024-03-04 VITALS
TEMPERATURE: 98 F | WEIGHT: 123 LBS | BODY MASS INDEX: 22.63 KG/M2 | HEART RATE: 56 BPM | OXYGEN SATURATION: 99 % | RESPIRATION RATE: 18 BRPM | HEIGHT: 62 IN | DIASTOLIC BLOOD PRESSURE: 69 MMHG | SYSTOLIC BLOOD PRESSURE: 136 MMHG

## 2024-03-04 VITALS
BODY MASS INDEX: 22.63 KG/M2 | HEART RATE: 56 BPM | OXYGEN SATURATION: 99 % | TEMPERATURE: 98 F | RESPIRATION RATE: 18 BRPM | DIASTOLIC BLOOD PRESSURE: 69 MMHG | HEIGHT: 62 IN | WEIGHT: 123 LBS | SYSTOLIC BLOOD PRESSURE: 136 MMHG

## 2024-03-04 DIAGNOSIS — F03.90 DEMENTIA, UNSPECIFIED DEMENTIA SEVERITY, UNSPECIFIED DEMENTIA TYPE, UNSPECIFIED WHETHER BEHAVIORAL, PSYCHOTIC, OR MOOD DISTURBANCE OR ANXIETY: ICD-10-CM

## 2024-03-04 DIAGNOSIS — C90.02 MULTIPLE MYELOMA IN RELAPSE: Primary | ICD-10-CM

## 2024-03-04 DIAGNOSIS — D75.9 DRUG-INDUCED CYTOPENIA: ICD-10-CM

## 2024-03-04 DIAGNOSIS — C90.00 MULTIPLE MYELOMA, REMISSION STATUS UNSPECIFIED: Primary | ICD-10-CM

## 2024-03-04 DIAGNOSIS — Z94.84 HISTORY OF AUTO STEM CELL TRANSPLANT: ICD-10-CM

## 2024-03-04 DIAGNOSIS — Z79.899 IMMUNODEFICIENCY DUE TO DRUGS: ICD-10-CM

## 2024-03-04 DIAGNOSIS — T50.905A DRUG-INDUCED CYTOPENIA: ICD-10-CM

## 2024-03-04 DIAGNOSIS — D84.821 IMMUNODEFICIENCY DUE TO DRUGS: ICD-10-CM

## 2024-03-04 PROCEDURE — 1159F MED LIST DOCD IN RCRD: CPT | Mod: HCNC,CPTII,S$GLB, | Performed by: INTERNAL MEDICINE

## 2024-03-04 PROCEDURE — 3078F DIAST BP <80 MM HG: CPT | Mod: HCNC,CPTII,S$GLB, | Performed by: INTERNAL MEDICINE

## 2024-03-04 PROCEDURE — 3288F FALL RISK ASSESSMENT DOCD: CPT | Mod: HCNC,CPTII,S$GLB, | Performed by: INTERNAL MEDICINE

## 2024-03-04 PROCEDURE — 63600175 PHARM REV CODE 636 W HCPCS: Mod: JZ,JG,HCNC | Performed by: INTERNAL MEDICINE

## 2024-03-04 PROCEDURE — 99999 PR PBB SHADOW E&M-EST. PATIENT-LVL III: CPT | Mod: PBBFAC,HCNC,, | Performed by: INTERNAL MEDICINE

## 2024-03-04 PROCEDURE — 96401 CHEMO ANTI-NEOPL SQ/IM: CPT | Mod: HCNC

## 2024-03-04 PROCEDURE — 1126F AMNT PAIN NOTED NONE PRSNT: CPT | Mod: HCNC,CPTII,S$GLB, | Performed by: INTERNAL MEDICINE

## 2024-03-04 PROCEDURE — 1101F PT FALLS ASSESS-DOCD LE1/YR: CPT | Mod: HCNC,CPTII,S$GLB, | Performed by: INTERNAL MEDICINE

## 2024-03-04 PROCEDURE — 99215 OFFICE O/P EST HI 40 MIN: CPT | Mod: HCNC,S$GLB,, | Performed by: INTERNAL MEDICINE

## 2024-03-04 PROCEDURE — 3072F LOW RISK FOR RETINOPATHY: CPT | Mod: HCNC,CPTII,S$GLB, | Performed by: INTERNAL MEDICINE

## 2024-03-04 PROCEDURE — 3075F SYST BP GE 130 - 139MM HG: CPT | Mod: HCNC,CPTII,S$GLB, | Performed by: INTERNAL MEDICINE

## 2024-03-04 PROCEDURE — 3008F BODY MASS INDEX DOCD: CPT | Mod: HCNC,CPTII,S$GLB, | Performed by: INTERNAL MEDICINE

## 2024-03-04 RX ORDER — EPINEPHRINE 0.3 MG/.3ML
0.3 INJECTION SUBCUTANEOUS ONCE AS NEEDED
Status: DISCONTINUED | OUTPATIENT
Start: 2024-03-04 | End: 2024-03-04 | Stop reason: HOSPADM

## 2024-03-04 RX ORDER — DIPHENHYDRAMINE HYDROCHLORIDE 50 MG/ML
50 INJECTION INTRAMUSCULAR; INTRAVENOUS ONCE AS NEEDED
Status: DISCONTINUED | OUTPATIENT
Start: 2024-03-04 | End: 2024-03-04 | Stop reason: HOSPADM

## 2024-03-04 RX ORDER — DIPHENHYDRAMINE HYDROCHLORIDE 50 MG/ML
50 INJECTION INTRAMUSCULAR; INTRAVENOUS ONCE AS NEEDED
Status: CANCELLED | OUTPATIENT
Start: 2024-03-06

## 2024-03-04 RX ORDER — EPINEPHRINE 0.3 MG/.3ML
0.3 INJECTION SUBCUTANEOUS ONCE AS NEEDED
Status: CANCELLED | OUTPATIENT
Start: 2024-03-06

## 2024-03-04 RX ADMIN — DARATUMUMAB AND HYALURONIDASE-FIHJ (HUMAN RECOMBINANT) 1800 MG: 1800; 30000 INJECTION SUBCUTANEOUS at 12:03

## 2024-03-04 NOTE — PROGRESS NOTES
PATIENT: Shelby Thompson  MRN: [id number]  DATE: 10/14/2021  Diagnosis:   Multiple Myeloma  Chief Complaint: MM f/u  Oncologic History: Per  primary Oncologist   Multiple Myeloma- presented w CRAB criteria (Hgb 7.1, hypercalcemia, renal function - Cr of 2.7, T7 vertebral fracture) and was diagnosed with IgG Kappa, RISS Stage III (beta-2 micro globulin of 17.5 and 14:20 translocation) High Risk disease.  She achieved and tolerated well VRd x completing 7, 28 day cycles and achieving deep VGPR to induction. Then underwent Melphalan autologous stem cell transplant on 2/12/2020.      Extensive PMH as below.    2 prior CVAs (one in 2008, one in 2011)  L breast cancer DCIS stage 0 (s/p lumpectomy and radiation, no endocrine tx due to CVA)  HTN  DM II  Neuropathy, b/l hands  Prior smoker, quit in 2011  Hepatic lesions - vascular per recent MRI in 09 /2019 with no changes; will confirm no further rascon needed from help  Statin Intolerance - on praluent, PCSK9 inhibitor  HFpEF - EF 55%, diastolic dysfunction  Anxiety/Depression     ADMISSION SUMMARY: 2/10-2/26/2020  Admitted 2/10, tolerated chemo and transplant well. Engrafted on day +12. Remained afebrile throughout hospital stay and no mucositis. Experienced expected side effects of nausea and diarrhea controlled with antiemetics and Imodium. Discharged home with home PT/OT       Subjective:       Initial History - 3/4/24  Ms. Thompson is a 73 y.o. female who presents for  follow up.  She is 3 yrs 2  months post AutoSCT. Relapsed  at approximately  1 year post sct and was on Sravanthi/zhanna/dex until summer 2022 when changed to sravanthi/pom/dex due to biochemical only progression. Dex stopped due to mood issues.  Remains on sravanthi/pom/dex salvage. Tolerating dose adjusted pomalyst.  Per friend accompanying  Her dementia has worsened  from last appt.  They have established neurologist.      Allergies:  Review of patient's allergies indicates:   Allergen Reactions    Lipitor  [atorvastatin] Itching     Itching, elevated cpk, muscle aches, statin induced myositis       Medications:  Current Outpatient Medications   Medication Sig Dispense Refill    acyclovir (ZOVIRAX) 400 MG tablet TAKE 1 TABLET BY MOUTH TWICE A  tablet 3    albuterol (ACCUNEB) 1.25 mg/3 mL Nebu Take 3 mLs (1.25 mg total) by nebulization every 6 (six) hours as needed (wheeze/cough). Rescue 75 mL 2    amLODIPine (NORVASC) 5 MG tablet TAKE 1 TABLET BY MOUTH EVERY DAY 90 tablet 2    amoxicillin-clavulanate 875-125mg (AUGMENTIN) 875-125 mg per tablet Take 1 tablet by mouth 2 (two) times daily. 14 tablet 0    aspirin (ECOTRIN) 81 MG EC tablet TAKE 1 TABLET BY MOUTH EVERY DAY 90 tablet 3    b complex vitamins (B COMPLEX-VITAMIN B12) tablet Take 1 tablet by mouth once daily. 30 tablet 3    cholecalciferol, vitamin D3, 125 mcg (5,000 unit) Tab 1 tablet DAILY (route: oral)      cyanocobalamin, vitamin B-12, 5,000 mcg Subl Place one under tongue once a day for 1 month, then continue to take under tongue per week 30 tablet 3    DARZALEX FASPRO 1,800 mg-30,000 unit/15 mL Soln       ENGERIX-B, PF, 20 mcg/mL Syrg       evolocumab (REPATHA SURECLICK) 140 mg/mL PnIj Inject 1 mL (140 mg total) into the skin every 14 (fourteen) days. 6 mL 3    ferrous gluconate 324 mg (37.5 mg iron) Tab tablet 1 tablet DAILY (route: oral)      gabapentin (NEURONTIN) 300 MG capsule Take 1 capsule (300 mg total) by mouth 2 (two) times daily. 180 capsule 2    GADAVIST 1 mmol/mL (604.72 mg/mL) Soln injection       loperamide (IMODIUM) 2 mg capsule TAKE 1 CAPSULE BY MOUTH 4 TIMES DAILY AS NEEDED FOR DIARRHEA. 60 capsule 0    magnesium oxide (MAG-OX) 400 mg (241.3 mg magnesium) tablet TAKE 2 TABLETS BY MOUTH 2 TIMES DAILY. 360 tablet 1    metFORMIN (GLUCOPHAGE) 1000 MG tablet Take 1 tablet (1,000 mg total) by mouth 2 (two) times daily with meals. 180 tablet 3    omeprazole (PRILOSEC) 40 MG capsule TAKE 1 CAPSULE BY MOUTH EVERY DAY 90 capsule 1     OXYGEN-AIR DELIVERY SYSTEMS MISC 1-2 Liter O2 - CONTINUOUS (route: Oxygen)      pomalidomide 2 mg Cap TAKE 1 CAPSULE (2MG) BY MOUTH EVERY DAY on days 1-21 of a 28 day cycle.  [facility] #[id number] 2/28/24. 21 capsule 0    potassium chloride SA (K-DUR,KLOR-CON) 20 MEQ tablet       potassium chloride SA (K-DUR,KLOR-CON) 20 MEQ tablet Take 1 tablet (20 mEq total) by mouth once daily. 30 tablet 11    sertraline (ZOLOFT) 50 MG tablet TAKE 1 TABLET BY MOUTH EVERY DAY 90 tablet 3    suvorexant (BELSOMRA) 10 mg Tab Take 1 tablet by mouth every evening. 30 tablet 3    valsartan (DIOVAN) 160 MG tablet Take 1 tablet (160 mg total) by mouth once daily. 90 tablet 3    VENOFER 100 mg iron/5 mL injection       zoledronic 4 mg/100 mL PgBk infusion       zoledronic acid (ZOMETA) 4 mg/5 mL injection        No current facility-administered medications for this visit.      See HPI     ECOG Performance Status: 2 (due to remote  CVA)   Objective:      Vitals:   Vitals:    03/04/24 1016   BP: 136/69   Pulse: (!) 56   Resp: 18   Temp: 97.9 °F (36.6 °C)     General - somewhat frail appearing, no apparent distress  HEENT - oropharynx clear  Chest and Lung - clear to auscultation bilaterally   Cardiovascular - RRR with no MGR, normal S1 and S2  Abdomen-  soft, nontender, no palpable hepatomegaly or splenomegaly  Lymph - no palpable lymphadenopathy  Heme - no bruising, petechiae, pallor  Skin - no rashes or lesions  Psych - appropriate mood and affect      Lab Results   Component Value Date    WBC 2.62 (L) 03/04/2024    HGB 11.8 (L) 03/04/2024    HCT 37.6 03/04/2024    MCV 87 03/04/2024     03/04/2024       Gran # (ANC)   Date Value Ref Range Status   03/04/2024 1.6 (L) 1.8 - 7.7 K/uL Final     Gran %   Date Value Ref Range Status   03/04/2024 59.9 38.0 - 73.0 % Final     CMP  Sodium   Date Value Ref Range Status   03/04/2024 140 136 - 145 mmol/L Final     Potassium   Date Value Ref Range Status   03/04/2024 5.0 3.5 - 5.1 mmol/L Final      Chloride   Date Value Ref Range Status   03/04/2024 109 95 - 110 mmol/L Final     CO2   Date Value Ref Range Status   03/04/2024 24 23 - 29 mmol/L Final     Glucose   Date Value Ref Range Status   03/04/2024 79 70 - 110 mg/dL Final     BUN   Date Value Ref Range Status   03/04/2024 16 8 - 23 mg/dL Final     Creatinine   Date Value Ref Range Status   03/04/2024 1.1 0.5 - 1.4 mg/dL Final     Calcium   Date Value Ref Range Status   03/04/2024 9.3 8.7 - 10.5 mg/dL Final     Total Protein   Date Value Ref Range Status   03/04/2024 7.3 6.0 - 8.4 g/dL Final     Albumin   Date Value Ref Range Status   03/04/2024 4.2 3.5 - 5.2 g/dL Final     Total Bilirubin   Date Value Ref Range Status   03/04/2024 0.4 0.1 - 1.0 mg/dL Final     Comment:     For infants and newborns, interpretation of results should be based  on gestational age, weight and in agreement with clinical  observations.    Premature Infant recommended reference ranges:  Up to 24 hours.............<8.0 mg/dL  Up to 48 hours............<12.0 mg/dL  3-5 days..................<15.0 mg/dL  6-29 days.................<15.0 mg/dL       Alkaline Phosphatase   Date Value Ref Range Status   03/04/2024 43 (L) 55 - 135 U/L Final     AST   Date Value Ref Range Status   03/04/2024 14 10 - 40 U/L Final     ALT   Date Value Ref Range Status   03/04/2024 8 (L) 10 - 44 U/L Final     Anion Gap   Date Value Ref Range Status   03/04/2024 7 (L) 8 - 16 mmol/L Final     eGFR   Date Value Ref Range Status   03/04/2024 52.7 (A) >60 mL/min/1.73 m^2 Final     IgG   Date Value Ref Range Status   03/04/2024 1170 650 - 1600 mg/dL Final     Comment:     IgG Cord Blood Reference Range: 650-1600 mg/dL.     IgA   Date Value Ref Range Status   03/04/2024 101 40 - 350 mg/dL Final     Comment:     IgA Cord Blood Reference Range: <5 mg/dL.     IgM   Date Value Ref Range Status   03/04/2024 14 (L) 50 - 300 mg/dL Final     Comment:     IgM Cord Blood Reference Range: <25 mg/dL.     [employer] Free Light  Chains   Date Value Ref Range Status   03/04/2024 13.64 (H) 0.33 - 1.94 mg/dL Final   02/09/2024 12.16 (H) 0.33 - 1.94 mg/dL Final   01/12/2024 11.43 (H) 0.33 - 1.94 mg/dL Final     Lambda Free Light Chains   Date Value Ref Range Status   03/04/2024 1.45 0.57 - 2.63 mg/dL Final   02/09/2024 1.74 0.57 - 2.63 mg/dL Final   01/12/2024 2.31 0.57 - 2.63 mg/dL Final     Kappa/Lambda FLC Ratio   Date Value Ref Range Status   03/04/2024 9.41 (H) 0.26 - 1.65 Final     Comment:     Undetected antigen excess is a rare event but cannot   be excluded. If these free light chain results do not   agree with other clinical or laboratory findings or   if the sample is from a patient that has previously   demonstrated antigen excess, discuss with the testing   laboratory.   Results should always be interpreted in conjunction   with other laboratory tests and clinical evidence.     02/09/2024 6.99 (H) 0.26 - 1.65 Final     Comment:     Undetected antigen excess is a rare event but cannot   be excluded. If these free light chain results do not   agree with other clinical or laboratory findings or   if the sample is from a patient that has previously   demonstrated antigen excess, discuss with the testing   laboratory.   Results should always be interpreted in conjunction   with other laboratory tests and clinical evidence.     01/12/2024 4.95 (H) 0.26 - 1.65 Final     Comment:     Undetected antigen excess is a rare event but cannot   be excluded. If these free light chain results do not   agree with other clinical or laboratory findings or   if the sample is from a patient that has previously   demonstrated antigen excess, discuss with the testing   laboratory.   Results should always be interpreted in conjunction   with other laboratory tests and clinical evidence.       Pathologist Interpretation SPE   Date Value Ref Range Status   03/04/2024 REVIEWED  Final     Comment:       Electronically reviewed and signed by:  Shelli ALEJANDRA  MD [place]  Signed on 03/06/24 at 10:13  Normal total protein. Normal gamma globulins are decreased. There is   a paraprotein band in gamma = 0.44 g/dL, previously 0.39 g/dL.      02/09/2024 REVIEWED  Final     Comment:       Electronically reviewed and signed by:  Shelli Haynes MD  Signed on 02/12/24 at 13:48  Normal total protein. Normal gamma globulins are decreased. There is   a paraprotein band in gamma = 0.39 g/dL, previously 0.40 g/dL.      01/12/2024 REVIEWED  Final     Comment:       Electronically reviewed and signed by:  Shelli Haynes MD  Signed on 01/17/24 at 09:38  Normal total protein. Normal gamma globulins are decreased. There is   a paraprotein band in gamma = 0.40 g/dL, previously 0.43 g/dL.        Pathologist Interpretation NATALY   Date Value Ref Range Status   03/04/2024 REVIEWED  Final     Comment:       Electronically reviewed and signed by:  Shelli Haynes MD  Signed on 03/06/24 at 10:12  IgG kappa specific monoclonal band present.          Lab Results   Component Value Date    UIBC 372 (H) 07/13/2012    IRON 49 04/30/2019    TRANSFERRIN 202 04/30/2019    TIBC 299 04/30/2019    FESATURATED 16 (L) 04/30/2019      Lab Results   Component Value Date    FERRITIN 201 01/12/2024       Assessment:       Ms. Thompson is a  73 y.o. femal with relapsed multiple myeloma.     Plan:     1. MM s/p Auto SCT  - high risk MM with 17p deletion  - 3  year 2  months  s/p Auto SCT  - started maintenance q2w velcade 5/6/20   - serial biochemical relapse  Noted and  given this and high risk CG , transitioned to Sravanthi/Noble (1mg/m2) /dex  Salvage July 2021    - Due to uncontrolled hyperglycemia dex stopped after C4.    -supportive meds:   continue ppx acyclovir, asa; resume maintenance zometa q 3 months ; next due early march 2024  -she  demonstrated mild biochemical progression over summer 2022 studies with no CRAB, in light of this and high risk CG   we transitioned her therapy from Sravanthi/Velcade to  "Sravanthi/pomalyst; intolerant to dex.     -Pomalyst dose decreased to  to 2mg (5/19/23) due to persistent neutropenia  now improved   -- biochemical studies from  3/4/24 cw very mild biochemical progression with no CRAB but still meeting  continued TX so plan to continue sravanthi/pom until intolerance or progression      -recommended she take prn imodium for occasional loose stool with pomalyst   -already on asa 81mg   -post transplant vaccines completed   -continue q 4 week labs/sravanthi and q 8 week MD appt     2. Anemia due to chemotherapy/JESSE  Due to therapy and JESSE  Completed venofer course dec 2023; monitor iron studie q 4  months for repeat iron needs     3. Neuropathy  -Stable   - continues gabapentin and prn tramadol     4. SOB/descending aortic aneurysm  - CTA and dopplers ordered urgently with high concern for DVT/PE; completed 8/3/20  - incidental descending thoracic aortic aneurysm noted; referred to thoracic surgery; seen 8/7/20; per note: "at current size would not recommend any intervention as risk of rupture remains low.  Recommend surveillance CT chest every 12 months to monitor growth of the aneurysm.  Would typically recommend aspirin 81 mg daily in patients with aortic aneurysm  - ASA started (9/14/20)     5. HTN  - continue norvasc  - Patient to monitor BP closely    6. Dementia/anxiety  -worsened    -mgmt per neurology   - contniue fu with neuro/psych;  defer medication mgmt to them             FU:   -cbc, cmp, serum free light chains, quantitative immunoglobulins, serum electropheresis, serum immunofixation and sravanthi injection in 4 weeks; no md appt   -same labs, MD appt, and sravanthi injection on 4/30/24  -please also add iron, tibc, ferritin to above labs on 4/30/24 for one time draw  "

## 2024-03-04 NOTE — Clinical Note
-cbc, cmp, serum free light chains, quantitative immunoglobulins, serum electropheresis, serum immunofixation and kenia injection in 4 weeks; no md appt  -same labs, MD appt, and kenia injection on 4/30/24 -please also add iron, tibc, ferritin to above labs on 4/30/24 for one time draw

## 2024-03-05 ENCOUNTER — PATIENT MESSAGE (OUTPATIENT)
Dept: HEMATOLOGY/ONCOLOGY | Facility: CLINIC | Age: 74
End: 2024-03-05
Payer: MEDICARE

## 2024-03-16 NOTE — TELEPHONE ENCOUNTER
Care Due:                  Date            Visit Type   Department     Provider  --------------------------------------------------------------------------------                                EP -                              PRIMARY      [facility] INTERNAL  Last Visit: 10-      CARE ([facility])   ASHLI Arevalo                               -                              PRIMARY      [facility] INTERNAL  Next Visit: 04-      CARE ([facility])   MEDICINE       Emanuel Arevalo                                                            Last  Test          Frequency    Reason                     Performed    Due Date  --------------------------------------------------------------------------------    HBA1C.......  6 months...  metFORMIN................  11- 05-    [facility] Embedded Care Due Messages. Reference number: [id number].   3/16/2024 7:09:18 AM CDT

## 2024-03-20 RX ORDER — OMEPRAZOLE 40 MG/1
CAPSULE, DELAYED RELEASE ORAL
Qty: 90 CAPSULE | Refills: 1 | Status: SHIPPED | OUTPATIENT
Start: 2024-03-20

## 2024-03-27 ENCOUNTER — TELEPHONE (OUTPATIENT)
Dept: PODIATRY | Facility: CLINIC | Age: 74
End: 2024-03-27
Payer: MEDICARE

## 2024-03-28 ENCOUNTER — OFFICE VISIT (OUTPATIENT)
Dept: HOME HEALTH SERVICES | Facility: CLINIC | Age: 74
End: 2024-03-28
Payer: MEDICARE

## 2024-03-28 VITALS
HEART RATE: 53 BPM | BODY MASS INDEX: 23.37 KG/M2 | SYSTOLIC BLOOD PRESSURE: 133 MMHG | WEIGHT: 127 LBS | HEIGHT: 62 IN | DIASTOLIC BLOOD PRESSURE: 86 MMHG

## 2024-03-28 DIAGNOSIS — I69.351 HEMIPLEGIA AND HEMIPARESIS FOLLOWING CEREBRAL INFARCTION AFFECTING RIGHT DOMINANT SIDE: ICD-10-CM

## 2024-03-28 DIAGNOSIS — Z94.84 HISTORY OF AUTO STEM CELL TRANSPLANT: ICD-10-CM

## 2024-03-28 DIAGNOSIS — E11.69 HYPERLIPIDEMIA ASSOCIATED WITH TYPE 2 DIABETES MELLITUS: ICD-10-CM

## 2024-03-28 DIAGNOSIS — C90.00 MULTIPLE MYELOMA, REMISSION STATUS UNSPECIFIED: ICD-10-CM

## 2024-03-28 DIAGNOSIS — I70.0 AORTIC ATHEROSCLEROSIS: ICD-10-CM

## 2024-03-28 DIAGNOSIS — E11.51 TYPE 2 DIABETES MELLITUS WITH DIABETIC PERIPHERAL ANGIOPATHY WITHOUT GANGRENE, WITH LONG-TERM CURRENT USE OF INSULIN: ICD-10-CM

## 2024-03-28 DIAGNOSIS — Z00.00 ENCOUNTER FOR PREVENTIVE HEALTH EXAMINATION: Primary | ICD-10-CM

## 2024-03-28 DIAGNOSIS — D84.9 IMMUNOCOMPROMISED: ICD-10-CM

## 2024-03-28 DIAGNOSIS — D75.9 DRUG-INDUCED CYTOPENIA: ICD-10-CM

## 2024-03-28 DIAGNOSIS — E11.59 HYPERTENSION ASSOCIATED WITH DIABETES: ICD-10-CM

## 2024-03-28 DIAGNOSIS — C90.02 MULTIPLE MYELOMA IN RELAPSE: ICD-10-CM

## 2024-03-28 DIAGNOSIS — F01.A18 MILD VASCULAR DEMENTIA WITH OTHER BEHAVIORAL DISTURBANCE: ICD-10-CM

## 2024-03-28 DIAGNOSIS — M85.80 OSTEOPENIA, UNSPECIFIED LOCATION: ICD-10-CM

## 2024-03-28 DIAGNOSIS — I15.2 HYPERTENSION ASSOCIATED WITH DIABETES: ICD-10-CM

## 2024-03-28 DIAGNOSIS — I82.0 BUDD-CHIARI SYNDROME: ICD-10-CM

## 2024-03-28 DIAGNOSIS — N18.31 STAGE 3A CHRONIC KIDNEY DISEASE: ICD-10-CM

## 2024-03-28 DIAGNOSIS — I69.30 HISTORY OF CVA WITH RESIDUAL DEFICIT: ICD-10-CM

## 2024-03-28 DIAGNOSIS — T50.905A DRUG-INDUCED CYTOPENIA: ICD-10-CM

## 2024-03-28 DIAGNOSIS — Z79.4 TYPE 2 DIABETES MELLITUS WITH DIABETIC PERIPHERAL ANGIOPATHY WITHOUT GANGRENE, WITH LONG-TERM CURRENT USE OF INSULIN: ICD-10-CM

## 2024-03-28 DIAGNOSIS — H25.13 NUCLEAR SCLEROSIS OF BOTH EYES: ICD-10-CM

## 2024-03-28 DIAGNOSIS — I27.9 PULMONARY HEART DISEASE: ICD-10-CM

## 2024-03-28 DIAGNOSIS — G62.0 DRUG-INDUCED POLYNEUROPATHY: ICD-10-CM

## 2024-03-28 DIAGNOSIS — F33.42 RECURRENT MAJOR DEPRESSIVE DISORDER, IN FULL REMISSION: ICD-10-CM

## 2024-03-28 DIAGNOSIS — R26.9 ABNORMALITY OF GAIT AND MOBILITY: ICD-10-CM

## 2024-03-28 DIAGNOSIS — E78.5 HYPERLIPIDEMIA ASSOCIATED WITH TYPE 2 DIABETES MELLITUS: ICD-10-CM

## 2024-03-28 DIAGNOSIS — J44.9 CHRONIC OBSTRUCTIVE PULMONARY DISEASE, UNSPECIFIED COPD TYPE: ICD-10-CM

## 2024-03-28 PROCEDURE — 4010F ACE/ARB THERAPY RXD/TAKEN: CPT | Mod: CPTII,S$GLB,, | Performed by: NURSE PRACTITIONER

## 2024-03-28 PROCEDURE — 1126F AMNT PAIN NOTED NONE PRSNT: CPT | Mod: CPTII,S$GLB,, | Performed by: NURSE PRACTITIONER

## 2024-03-28 PROCEDURE — 1101F PT FALLS ASSESS-DOCD LE1/YR: CPT | Mod: CPTII,S$GLB,, | Performed by: NURSE PRACTITIONER

## 2024-03-28 PROCEDURE — 3288F FALL RISK ASSESSMENT DOCD: CPT | Mod: CPTII,S$GLB,, | Performed by: NURSE PRACTITIONER

## 2024-03-28 PROCEDURE — G0439 PPPS, SUBSEQ VISIT: HCPCS | Mod: S$GLB,,, | Performed by: NURSE PRACTITIONER

## 2024-03-28 PROCEDURE — 3079F DIAST BP 80-89 MM HG: CPT | Mod: CPTII,S$GLB,, | Performed by: NURSE PRACTITIONER

## 2024-03-28 PROCEDURE — 1159F MED LIST DOCD IN RCRD: CPT | Mod: CPTII,S$GLB,, | Performed by: NURSE PRACTITIONER

## 2024-03-28 PROCEDURE — 3075F SYST BP GE 130 - 139MM HG: CPT | Mod: CPTII,S$GLB,, | Performed by: NURSE PRACTITIONER

## 2024-03-28 PROCEDURE — 3072F LOW RISK FOR RETINOPATHY: CPT | Mod: CPTII,S$GLB,, | Performed by: NURSE PRACTITIONER

## 2024-03-28 PROCEDURE — 1160F RVW MEDS BY RX/DR IN RCRD: CPT | Mod: CPTII,S$GLB,, | Performed by: NURSE PRACTITIONER

## 2024-03-28 NOTE — PATIENT INSTRUCTIONS
Counseling and Referral of Other Preventative  (Italic type indicates deductible and co-insurance are waived)    Patient Name: Shelby Thompson  Today's Date: 3/28/2024    Health Maintenance       Date Due Completion Date    Diabetes Urine Screening Never done ---    Shingles Vaccine (1 of 2) Never done ---    RSV Vaccine (Age 60+ and Pregnant patients) (1 - 1-dose 60+ series) Never done ---    COVID-19 Vaccine (6 - 2023-24 season) 09/01/2023 1/17/2023    Lipid Panel 01/10/2024 1/10/2023    Mammogram 02/02/2024 2/2/2023    Foot Exam 04/18/2024 4/18/2023    Override on 4/18/2023: Done    Override on 4/18/2022: Done    Override on 6/3/2021: Done    Override on 6/4/2020: Done    Hemoglobin A1c 05/09/2024 11/9/2023    Eye Exam 09/14/2024 9/14/2023    Colorectal Cancer Screening 01/09/2025 1/9/2020    DEXA Scan 10/27/2025 10/27/2023    TETANUS VACCINE 06/23/2026 6/23/2016        No orders of the defined types were placed in this encounter.    The following information is provided to all patients.  This information is to help you find resources for any of the problems found today that may be affecting your health:                  Living healthy guide: www.[facility].[place].gov      Understanding Diabetes: www.diabetes.org      Eating healthy: www.cdc.gov/healthyweight      CDC home safety checklist: www.cdc.gov/steadi/patient.html      Agency on Aging: www.goea.[place].[facility]      Alcoholics anonymous (AA): www.aa.org      Physical Activity: www.joseline.nih.gov/jx3qcfg      Tobacco use: www.quitwithusla.org

## 2024-03-28 NOTE — PROGRESS NOTES
"  Shelby Thompson presented for a follow-up Medicare AWV today. The following components were reviewed and updated:    Medical history  Family History  Social history  Allergies and Current Medications  Health Risk Assessment  Health Maintenance  Care Team    **See Completed Assessments for Annual Wellness visit with in the encounter summary    The following assessments were completed:  Depression Screening  Cognitive function Screening  Timed Get Up Test  Whisper Test      Opioid documentation:      Patient does not have a current opioid prescription.          Vitals:    03/28/24 1126   BP: 133/86   Pulse: (!) 53   Weight: 57.6 kg (127 lb)   Height: 5' 2" (1.575 m)     Body mass index is 23.23 kg/m².       Physical Exam  Constitutional:       Appearance: Normal appearance.   HENT:      Head: Normocephalic and atraumatic.      Nose: Nose normal.      Mouth/Throat:      Mouth: Mucous membranes are moist.   Eyes:      Extraocular Movements: Extraocular movements intact.   Cardiovascular:      Rate and Rhythm: Regular rhythm. Bradycardia present.      Heart sounds: Normal heart sounds.   Pulmonary:      Effort: Pulmonary effort is normal. No respiratory distress.      Breath sounds: Normal breath sounds.   Abdominal:      General: Bowel sounds are normal. There is no distension.      Palpations: Abdomen is soft.   Musculoskeletal:         General: Normal range of motion.      Cervical back: Normal range of motion.      Comments: Right sided weakness   Neurological:      Mental Status: She is alert. Mental status is at baseline.      Gait: Gait abnormal.   Psychiatric:         Mood and Affect: Mood normal.         Behavior: Behavior normal.         Cognition and Memory: Memory is impaired.           Diagnoses and health risks identified today and associated recommendations/orders:  1. Encounter for preventive health examination  Assessments completed. Preventive measures, health maintenance, and immunizations reviewed " with patient spouse.    2. Type 2 diabetes mellitus with diabetic peripheral angiopathy without gangrene, with long-term current use of insulin  Stable, patient on Metformin. Followed by PCP.    3. 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    4. Multiple myeloma in relapse  Stable, patient on Pomalidomide. Followed by Hematology/Oncology.    5. Immunocompromised  Stable, followed by Hematology    6. Drug-induced polyneuropathy  Stable, patient on Gabapentin. Followed by PCP and Hematology/Oncology.    7. Hemiplegia and hemiparesis following cerebral infarction affecting right dominant side  Stable, followed by PCP.    8. Recurrent major depressive disorder, in full remission  Stable, patient on Zoloft. Followed by PCP.    9. Chronic obstructive pulmonary disease, unspecified COPD type  Stable, followed by PCP.    10. Pulmonary heart disease  Stable, followed by PCP.    11. Aortic atherosclerosis  Stable, patient on Aspirin. Followed by PCP.    12. Hypertension associated with diabetes  Stable, patient on Amlodipine. Followed by PCP.    13. Hyperlipidemia associated with type 2 diabetes mellitus  Stable, patient on Repatha injections. Followed by PCP.    14. History of auto stem cell transplant  Stable, followed by Hematology/Oncology.    15. Stage 3a chronic kidney disease  Stable, followed by PCP.    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    17. Nuclear sclerosis of both eyes  Stable, followed by Optometry.    18. Osteopenia, unspecified location  Stable, patient on Vitamin D3. Followed by PCP.    19. History of CVA with residual deficit  Stable, followed by PCP.    20. Abnormality of gait and mobility  Stable, followed by PCP.      Provided Shelby with a 5-10 year written screening schedule and personal prevention plan. Recommendations were developed using the USPSTF age appropriate recommendations. Education, counseling, and referrals  were provided as needed.  After Visit Summary printed and given to patient which includes a list of additional screenings\tests needed.    Follow up in about 1 year (around 3/28/2025) for your next annual wellness visit.      Mendy Jones NP  I offered to discuss advanced care planning, including how to pick a person who would make decisions for you if you were unable to make them for yourself, called a health care power of , and what kind of decisions you might make such as use of life sustaining treatments such as ventilators and tube feeding when faced with a life limiting illness recorded on a living will that they will need to know. (How you want to be cared for as you near the end of your natural life)     X Patient is interested in learning more about how to make advanced directives.  I provided them paperwork and offered to discuss this with them.

## 2024-03-31 DIAGNOSIS — F33.42 MAJOR DEPRESSIVE DISORDER, RECURRENT, IN FULL REMISSION: ICD-10-CM

## 2024-03-31 PROBLEM — N18.31 STAGE 3A CHRONIC KIDNEY DISEASE: Status: ACTIVE | Noted: 2024-03-31

## 2024-04-01 RX ORDER — SERTRALINE HYDROCHLORIDE 50 MG/1
50 TABLET, FILM COATED ORAL DAILY
Qty: 90 TABLET | Refills: 3 | Status: SHIPPED | OUTPATIENT
Start: 2024-04-01

## 2024-04-01 NOTE — TELEPHONE ENCOUNTER
No care due was identified.  [facility] Embedded Care Due Messages. Reference number: [id number].   3/31/2024 9:37:52 PM CDT

## 2024-04-04 ENCOUNTER — OFFICE VISIT (OUTPATIENT)
Dept: PODIATRY | Facility: CLINIC | Age: 74
End: 2024-04-04
Payer: MEDICARE

## 2024-04-04 VITALS
BODY MASS INDEX: 21.94 KG/M2 | HEART RATE: 54 BPM | WEIGHT: 119.25 LBS | DIASTOLIC BLOOD PRESSURE: 75 MMHG | HEIGHT: 62 IN | SYSTOLIC BLOOD PRESSURE: 125 MMHG

## 2024-04-04 DIAGNOSIS — G62.0 DRUG-INDUCED POLYNEUROPATHY: Primary | ICD-10-CM

## 2024-04-04 DIAGNOSIS — L60.9 DISEASE OF NAIL: ICD-10-CM

## 2024-04-04 PROCEDURE — 1101F PT FALLS ASSESS-DOCD LE1/YR: CPT | Mod: HCNC,CPTII,S$GLB, | Performed by: PODIATRIST

## 2024-04-04 PROCEDURE — 4010F ACE/ARB THERAPY RXD/TAKEN: CPT | Mod: HCNC,CPTII,S$GLB, | Performed by: PODIATRIST

## 2024-04-04 PROCEDURE — 11721 DEBRIDE NAIL 6 OR MORE: CPT | Mod: Q9,HCNC,S$GLB, | Performed by: PODIATRIST

## 2024-04-04 PROCEDURE — 3074F SYST BP LT 130 MM HG: CPT | Mod: HCNC,CPTII,S$GLB, | Performed by: PODIATRIST

## 2024-04-04 PROCEDURE — 3072F LOW RISK FOR RETINOPATHY: CPT | Mod: HCNC,CPTII,S$GLB, | Performed by: PODIATRIST

## 2024-04-04 PROCEDURE — 3288F FALL RISK ASSESSMENT DOCD: CPT | Mod: HCNC,CPTII,S$GLB, | Performed by: PODIATRIST

## 2024-04-04 PROCEDURE — 3078F DIAST BP <80 MM HG: CPT | Mod: HCNC,CPTII,S$GLB, | Performed by: PODIATRIST

## 2024-04-04 PROCEDURE — 99203 OFFICE O/P NEW LOW 30 MIN: CPT | Mod: 25,HCNC,S$GLB, | Performed by: PODIATRIST

## 2024-04-04 PROCEDURE — 1159F MED LIST DOCD IN RCRD: CPT | Mod: HCNC,CPTII,S$GLB, | Performed by: PODIATRIST

## 2024-04-04 PROCEDURE — 99999 PR PBB SHADOW E&M-EST. PATIENT-LVL II: CPT | Mod: PBBFAC,HCNC,, | Performed by: PODIATRIST

## 2024-04-04 NOTE — PROGRESS NOTES
Subjective:      Patient ID: Shelby Thompson is a 74 y.o. female.    Chief Complaint: Diabetic Foot Exam (10/17/23 - Emanuel Arevalo Jr., MD, PCP) and Toe Pain (Right toes)    Shelby is a 74 y.o. female who presents to the clinic for evaluation and treatment of high risk feet. Shelby has a past medical history of Acute respiratory failure with hypoxia (4/30/2019), FUENTES (acute kidney injury) (5/1/2019), Allergy, Anemia, Aortic aneurysm, Breast cancer (10/2011), Cataract, Chronic diastolic congestive heart failure (11/6/2015), Colon polyp, Community acquired pneumonia of right lower lobe of lung (8/3/2021), GERD (gastroesophageal reflux disease), Hiatal hernia, History of colonic polyps, psychiatric care, breast cancer, Hyperlipemia, Hypertension, ICH (intracerebral hemorrhage), Immunocompromised (10/28/2022), Major depressive disorder, single episode, mild (6/23/2016), Nuclear sclerosis (7/21/2014), 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 (7/21/2014), PAD (peripheral artery disease) (11/6/2015), Psychiatric problem, Statin-induced rhabdomyolysis, Stroke (4/2011), Type 2 diabetes mellitus with diabetic peripheral angiopathy without gangrene, with long-term current use of insulin (3/31/2021), Type 2 diabetes mellitus without complication, without long-term current use of insulin (2/10/2020), and Walker as ambulation aid. The patient's chief complaint is long, thick toenails. This patient has documented high risk feet requiring routine maintenance secondary to peripheral neuropathy.    PCP: Emanuel Arevalo Jr., MD    Date Last Seen by PCP:   Chief Complaint   Patient presents with    Diabetic Foot Exam     10/17/23 - Emanuel Arevalo Jr., MD, PCP    Toe Pain     Right toes         Current shoe gear:  Affected Foot: Casual shoes     Unaffected Foot: Casual shoes    Last encounter in this department: Visit date not found    Hemoglobin A1C   Date Value Ref Range Status   11/09/2023 5.5 4.0  - 5.6 % Final     Comment:     ADA Screening Guidelines:  5.7-6.4%  Consistent with prediabetes  >or=6.5%  Consistent with diabetes    High levels of fetal hemoglobin interfere with the HbA1C  assay. Heterozygous hemoglobin variants (HbS, HgC, etc)do  not significantly interfere with this assay.   However, presence of multiple variants may affect accuracy.     04/21/2023 5.3 4.0 - 5.6 % Final     Comment:     ADA Screening Guidelines:  5.7-6.4%  Consistent with prediabetes  >or=6.5%  Consistent with diabetes    High levels of fetal hemoglobin interfere with the HbA1C  assay. Heterozygous hemoglobin variants (HbS, HgC, etc)do  not significantly interfere with this assay.   However, presence of multiple variants may affect accuracy.     01/10/2023 5.7 (H) 4.0 - 5.6 % Final     Comment:     ADA Screening Guidelines:  5.7-6.4%  Consistent with prediabetes  >or=6.5%  Consistent with diabetes    High levels of fetal hemoglobin interfere with the HbA1C  assay. Heterozygous hemoglobin variants (HbS, HgC, etc)do  not significantly interfere with this assay.   However, presence of multiple variants may affect accuracy.         Review of Systems   Constitutional: Negative for chills, fever and malaise/fatigue.   HENT:  Negative for hearing loss.    Cardiovascular:  Negative for claudication.   Respiratory:  Negative for shortness of breath.    Skin:  Positive for color change, dry skin and nail changes. Negative for flushing and rash.   Musculoskeletal:  Negative for joint pain and myalgias.   Neurological:  Positive for numbness and paresthesias. Negative for loss of balance and sensory change.   Psychiatric/Behavioral:  Positive for memory loss. Negative for altered mental status.            Objective:      Physical Exam  Vitals reviewed. Exam conducted with a chaperone present.   Cardiovascular:      Pulses:           Dorsalis pedis pulses are 2+ on the right side and 2+ on the left side.        Posterior tibial pulses are  2+ on the right side and 2+ on the left side.      Comments: No edema noted b/L  Musculoskeletal:      Comments:        Feet:      Right foot:      Protective Sensation: 5 sites tested.  5 sites sensed.      Left foot:      Protective Sensation: 5 sites tested.  5 sites sensed.   Skin:     General: Skin is warm.      Capillary Refill: Capillary refill takes 2 to 3 seconds.      Comments: Normal skin tugor noted.   No open lesion noted b/L  Skin temp is warm to warm from proximal to distal b/L.  Webspaces clean, dry, and intact     Neurological:      Mental Status: She is oriented to person, place, and time.      Comments: Gross sensation intact b/L               Assessment:       Encounter Diagnoses   Name Primary?    Drug-induced polyneuropathy Yes    Disease of nail          Plan:       Shelby was seen today for diabetic foot exam and toe pain.    Diagnoses and all orders for this visit:    Drug-induced polyneuropathy    Disease of nail      I counseled the patient on her conditions, their implications and medical management.        - Shoe inspection. Patient instructed on proper foot hygeine. We discussed wearing proper shoe gear, daily foot inspections, never walking without protective shoe gear, never putting sharp instruments to feet, routine podiatric nail visits every 2-3 months.      - With patient's permission, nails were aggressively reduced and debrided x 10 to their soft tissue attachment mechanically and with electric , removing all offending nail and debris. Patient relates relief following the procedure. She will continue to monitor the areas daily, inspect her feet, wear protective shoe gear when ambulatory, moisturizer to maintain skin integrity and follow in this office in approximately 2-3 months, sooner p.r.n.

## 2024-04-15 ENCOUNTER — INFUSION (OUTPATIENT)
Dept: INFUSION THERAPY | Facility: HOSPITAL | Age: 74
End: 2024-04-15
Payer: MEDICARE

## 2024-04-15 VITALS
HEIGHT: 62 IN | OXYGEN SATURATION: 99 % | RESPIRATION RATE: 18 BRPM | WEIGHT: 119 LBS | HEART RATE: 60 BPM | DIASTOLIC BLOOD PRESSURE: 58 MMHG | SYSTOLIC BLOOD PRESSURE: 130 MMHG | BODY MASS INDEX: 21.9 KG/M2

## 2024-04-15 DIAGNOSIS — C90.02 MULTIPLE MYELOMA IN RELAPSE: Primary | ICD-10-CM

## 2024-04-15 DIAGNOSIS — C90.01 MULTIPLE MYELOMA IN REMISSION: ICD-10-CM

## 2024-04-15 PROCEDURE — 96372 THER/PROPH/DIAG INJ SC/IM: CPT | Mod: HCNC

## 2024-04-15 PROCEDURE — 63600175 PHARM REV CODE 636 W HCPCS: Mod: JZ,JG,HCNC | Performed by: INTERNAL MEDICINE

## 2024-04-15 RX ORDER — EPINEPHRINE 0.3 MG/.3ML
0.3 INJECTION SUBCUTANEOUS ONCE AS NEEDED
Status: CANCELLED | OUTPATIENT
Start: 2024-04-17

## 2024-04-15 RX ORDER — GABAPENTIN 300 MG/1
300 CAPSULE ORAL 2 TIMES DAILY
Qty: 180 CAPSULE | Refills: 2 | Status: SHIPPED | OUTPATIENT
Start: 2024-04-15

## 2024-04-15 RX ORDER — DIPHENHYDRAMINE HYDROCHLORIDE 50 MG/ML
50 INJECTION INTRAMUSCULAR; INTRAVENOUS ONCE AS NEEDED
Status: DISCONTINUED | OUTPATIENT
Start: 2024-04-15 | End: 2024-04-15 | Stop reason: HOSPADM

## 2024-04-15 RX ORDER — EPINEPHRINE 0.3 MG/.3ML
0.3 INJECTION SUBCUTANEOUS ONCE AS NEEDED
Status: DISCONTINUED | OUTPATIENT
Start: 2024-04-15 | End: 2024-04-15 | Stop reason: HOSPADM

## 2024-04-15 RX ORDER — DIPHENHYDRAMINE HYDROCHLORIDE 50 MG/ML
50 INJECTION INTRAMUSCULAR; INTRAVENOUS ONCE AS NEEDED
Status: CANCELLED | OUTPATIENT
Start: 2024-04-17

## 2024-04-15 RX ADMIN — DARATUMUMAB AND HYALURONIDASE-FIHJ (HUMAN RECOMBINANT) 1800 MG: 1800; 30000 INJECTION SUBCUTANEOUS at 02:04

## 2024-04-15 NOTE — PLAN OF CARE
1300 Labs, hx, and medications reviewed, pt meets parameters for treatment today. Assessment completed and plan of care reviewed. Pt verbalized understanding. Pt voices no new complaints or concerns, will continue to monitor for safety.

## 2024-04-15 NOTE — TELEPHONE ENCOUNTER
No care due was identified.  [facility] Embedded Care Due Messages. Reference number: [id number].   4/15/2024 4:16:17 PM CDT

## 2024-04-15 NOTE — PLAN OF CARE
1414 Pt tolerated Sravanthi subq well today, no complaints or complications,. VSS. Pt aware to call provider with any questions or concerns and is aware of upcoming appts. Pt ambulatory from clinic with steady gait, no distress noted.

## 2024-04-18 ENCOUNTER — OFFICE VISIT (OUTPATIENT)
Dept: INTERNAL MEDICINE | Facility: CLINIC | Age: 74
End: 2024-04-18
Payer: MEDICARE

## 2024-04-18 ENCOUNTER — LAB VISIT (OUTPATIENT)
Dept: LAB | Facility: HOSPITAL | Age: 74
End: 2024-04-18
Attending: INTERNAL MEDICINE
Payer: MEDICARE

## 2024-04-18 VITALS
BODY MASS INDEX: 23.45 KG/M2 | HEART RATE: 62 BPM | HEIGHT: 62 IN | WEIGHT: 127.44 LBS | DIASTOLIC BLOOD PRESSURE: 72 MMHG | OXYGEN SATURATION: 97 % | SYSTOLIC BLOOD PRESSURE: 106 MMHG

## 2024-04-18 DIAGNOSIS — Z86.010 HISTORY OF COLON POLYPS: ICD-10-CM

## 2024-04-18 DIAGNOSIS — E11.51 TYPE 2 DIABETES MELLITUS WITH DIABETIC PERIPHERAL ANGIOPATHY WITHOUT GANGRENE, WITH LONG-TERM CURRENT USE OF INSULIN: ICD-10-CM

## 2024-04-18 DIAGNOSIS — E11.59 HYPERTENSION ASSOCIATED WITH DIABETES: ICD-10-CM

## 2024-04-18 DIAGNOSIS — D84.9 IMMUNOCOMPROMISED: ICD-10-CM

## 2024-04-18 DIAGNOSIS — C90.00 METASTATIC MULTIPLE MYELOMA TO BONE: ICD-10-CM

## 2024-04-18 DIAGNOSIS — F03.B0 MODERATE DEMENTIA, UNSPECIFIED DEMENTIA TYPE, UNSPECIFIED WHETHER BEHAVIORAL, PSYCHOTIC, OR MOOD DISTURBANCE OR ANXIETY: ICD-10-CM

## 2024-04-18 DIAGNOSIS — N18.31 STAGE 3A CHRONIC KIDNEY DISEASE: ICD-10-CM

## 2024-04-18 DIAGNOSIS — I70.0 AORTIC ATHEROSCLEROSIS: ICD-10-CM

## 2024-04-18 DIAGNOSIS — F03.90 DEMENTIA, UNSPECIFIED DEMENTIA SEVERITY, UNSPECIFIED DEMENTIA TYPE, UNSPECIFIED WHETHER BEHAVIORAL, PSYCHOTIC, OR MOOD DISTURBANCE OR ANXIETY: ICD-10-CM

## 2024-04-18 DIAGNOSIS — Z94.84 HISTORY OF AUTO STEM CELL TRANSPLANT: ICD-10-CM

## 2024-04-18 DIAGNOSIS — I69.351 HEMIPLEGIA AND HEMIPARESIS FOLLOWING CEREBRAL INFARCTION AFFECTING RIGHT DOMINANT SIDE: ICD-10-CM

## 2024-04-18 DIAGNOSIS — D63.0 ANEMIA IN NEOPLASTIC DISEASE: Primary | ICD-10-CM

## 2024-04-18 DIAGNOSIS — Z79.4 TYPE 2 DIABETES MELLITUS WITH DIABETIC PERIPHERAL ANGIOPATHY WITHOUT GANGRENE, WITH LONG-TERM CURRENT USE OF INSULIN: ICD-10-CM

## 2024-04-18 DIAGNOSIS — E78.5 HYPERLIPIDEMIA ASSOCIATED WITH TYPE 2 DIABETES MELLITUS: ICD-10-CM

## 2024-04-18 DIAGNOSIS — J44.9 CHRONIC OBSTRUCTIVE PULMONARY DISEASE, UNSPECIFIED COPD TYPE: ICD-10-CM

## 2024-04-18 DIAGNOSIS — I71.23 ANEURYSM OF DESCENDING THORACIC AORTA WITHOUT RUPTURE: ICD-10-CM

## 2024-04-18 DIAGNOSIS — I10 ESSENTIAL HYPERTENSION: ICD-10-CM

## 2024-04-18 DIAGNOSIS — I15.2 HYPERTENSION ASSOCIATED WITH DIABETES: ICD-10-CM

## 2024-04-18 DIAGNOSIS — E11.69 HYPERLIPIDEMIA ASSOCIATED WITH TYPE 2 DIABETES MELLITUS: ICD-10-CM

## 2024-04-18 LAB
CHOLEST SERPL-MCNC: 114 MG/DL (ref 120–199)
CHOLEST/HDLC SERPL: 1.9 {RATIO} (ref 2–5)
ESTIMATED AVG GLUCOSE: 108 MG/DL (ref 68–131)
HBA1C MFR BLD: 5.4 % (ref 4–5.6)
HDLC SERPL-MCNC: 61 MG/DL (ref 40–75)
HDLC SERPL: 53.5 % (ref 20–50)
LDLC SERPL CALC-MCNC: 34.8 MG/DL (ref 63–159)
NONHDLC SERPL-MCNC: 53 MG/DL
TRIGL SERPL-MCNC: 91 MG/DL (ref 30–150)

## 2024-04-18 PROCEDURE — 4010F ACE/ARB THERAPY RXD/TAKEN: CPT | Mod: HCNC,CPTII,S$GLB, | Performed by: INTERNAL MEDICINE

## 2024-04-18 PROCEDURE — G2211 COMPLEX E/M VISIT ADD ON: HCPCS | Mod: HCNC,S$GLB,, | Performed by: INTERNAL MEDICINE

## 2024-04-18 PROCEDURE — 83036 HEMOGLOBIN GLYCOSYLATED A1C: CPT | Mod: HCNC | Performed by: INTERNAL MEDICINE

## 2024-04-18 PROCEDURE — 1126F AMNT PAIN NOTED NONE PRSNT: CPT | Mod: HCNC,CPTII,S$GLB, | Performed by: INTERNAL MEDICINE

## 2024-04-18 PROCEDURE — 1159F MED LIST DOCD IN RCRD: CPT | Mod: HCNC,CPTII,S$GLB, | Performed by: INTERNAL MEDICINE

## 2024-04-18 PROCEDURE — 80061 LIPID PANEL: CPT | Mod: HCNC | Performed by: INTERNAL MEDICINE

## 2024-04-18 PROCEDURE — 3288F FALL RISK ASSESSMENT DOCD: CPT | Mod: HCNC,CPTII,S$GLB, | Performed by: INTERNAL MEDICINE

## 2024-04-18 PROCEDURE — 3078F DIAST BP <80 MM HG: CPT | Mod: HCNC,CPTII,S$GLB, | Performed by: INTERNAL MEDICINE

## 2024-04-18 PROCEDURE — 3072F LOW RISK FOR RETINOPATHY: CPT | Mod: HCNC,CPTII,S$GLB, | Performed by: INTERNAL MEDICINE

## 2024-04-18 PROCEDURE — 36415 COLL VENOUS BLD VENIPUNCTURE: CPT | Mod: HCNC | Performed by: INTERNAL MEDICINE

## 2024-04-18 PROCEDURE — 99999 PR PBB SHADOW E&M-EST. PATIENT-LVL V: CPT | Mod: PBBFAC,HCNC,, | Performed by: INTERNAL MEDICINE

## 2024-04-18 PROCEDURE — 3074F SYST BP LT 130 MM HG: CPT | Mod: HCNC,CPTII,S$GLB, | Performed by: INTERNAL MEDICINE

## 2024-04-18 PROCEDURE — 99214 OFFICE O/P EST MOD 30 MIN: CPT | Mod: HCNC,S$GLB,, | Performed by: INTERNAL MEDICINE

## 2024-04-18 PROCEDURE — 1101F PT FALLS ASSESS-DOCD LE1/YR: CPT | Mod: HCNC,CPTII,S$GLB, | Performed by: INTERNAL MEDICINE

## 2024-04-18 RX ORDER — VALSARTAN 160 MG/1
160 TABLET ORAL DAILY
Qty: 90 TABLET | Refills: 3 | Status: SHIPPED | OUTPATIENT
Start: 2024-04-18

## 2024-04-18 RX ORDER — DONEPEZIL HYDROCHLORIDE 5 MG/1
5 TABLET, FILM COATED ORAL NIGHTLY
Qty: 90 TABLET | Refills: 2 | Status: SHIPPED | OUTPATIENT
Start: 2024-04-18

## 2024-04-18 NOTE — PROGRESS NOTES
This note was generated with [employer] voice recognition software. I apologize for any possible typographical errors.        Subjective:       Patient ID:  Shelby is a 74 y.o. female being seen for follow .       Chief Complaint: Follow-up-  6  months.         73 yo F with Dementia coming in today to to follow up with her multiple myeloma (follows with Heme/Onc, s/p stem cell transplant Feb 2020), T2DM, thrombocytopenia, depression, drug induced neuropathy, chronic diastolic heart failure, hx of stroke (one in 2008, one in 2011) with hemiplegia on right side- using cane , HLD, anemia, colon polyps who presents for follow up. Prior smoker, quit in 2011.  She is accompanied by family member.    Oncologic History: Last seen by Hem/onc 3/4/2024.       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  She achieved and tolerated well VRd x completing 7, 28 day cycles and achieving deep VGPR to induction. Then underwent Melphalan autologous stem cell transplant on 2/12/2020.   She saw Onc   last month-3/04/2024-  note reviewed-some below- they have her set up to see Neurology in MARCH 2023)  1. MM s/p Auto SCT  - high risk MM with 17p deletion  - 4  year 2  months  s/p Auto SCT  - started maintenance q2w velcade 5/6/20   - serial biochemical relapse  Noted and  given this and high risk CG , transitioned to Sravanthi/Noble (1mg/m2) /dex  Salvage July 2021    - Due to uncontrolled hyperglycemia dex stopped after C4.    -supportive meds:   continue ppx acyclovir, asa; resume maintenance zometa q 3 months ; next due early march 2024  -she  demonstrated mild biochemical progression over summer 2022 studies with no CRAB, in light of this and high risk CG   we transitioned her therapy from Sravanthi/Velcade to  "Sravanthi/pomalyst; intolerant to dex.     -Pomalyst dose decreased to  to 2mg (5/19/23) due to persistent neutropenia  now improved   -- biochemical studies from  3/4/24 cw very mild biochemical progression with no CRAB but still meeting  continued NH so plan to continue sravanthi/pom until intolerance or progression             -post transplant vaccines completed   -same labs, MD pastor, and sravanthi injection on 4/30/24      Anemia due to MM and chemo-- 10.3/34.3-- WBC 3.59    Plt 176          Neuropathy due to chemo.    -Stable   - continues gabapentin and prn tramadol        SHe has diabetes.  Recent hgb A1c 5.5 ( 11/2023) . on metformin.  No retinopathy-- has cataracts       Htn on amlodipine and valsartan  on repatha for Hyperlipidemia due to statin intolerance.  -- /72.       She has COPD-- has not been using her inhalers.  SUNsure wether she has had to use her  home o2 recently. ( "My Grandmother is giving it to me" ) .  SHe has avoided URI and pneumonias this winter.               Fatigue: less fatigue than before-- doing pretty " good" per her ( she is still getting tired a lot per her friend) ,   Increased appetite from before, eats 3 meals per day, consuming ice cream about 3x/week . Sometimes will eat only 2 meals if she is sleeping.  Weight loss/gain:wt down 3 #   since last visit. ^ #  since the beginning of year.   Things don't taste like they use to.  SHe has ensure but does not drink it all the time.             Wt is down to 140#--         Right hand twitching, chronic.   Still able to cook.  helps with med administration and keeping track of blood sugars.   Elevates feet for leg swelling, uses compression stockings.            HLD with Statin Intolerance: on praluent, PCSK9 inhibitor-- lipid panel form 1/23 reviewed      Dementia--- having hallucinations but less -She is seeing bugs.  See one under my chair-  . .  She is getting confused.  SHe has to have help with bathing, eating, food prep, and " "other ADL's.   NO SI or HI>  She is suppose to be on zofloft but she forgot this and has not tired this yet.           incidental descending thoracic aortic aneurysm noted; referred to thoracic surgery; seen 8/7/20; per note: "at current size would not recommend any intervention as risk of rupture remains low.  Recommend surveillance CT chest every 12 months to monitor growth of the aneurysm.  Would typically recommend aspirin 81 mg daily in patients with aortic aneurysm   Last cta was 10/2023- due to repeat 10/2024.       Review of Systems   Constitutional: Negative for fever. Weight is down 13 #.  NO eating much.    HENT: Negative for sore throat.    Eyes: Negative for blurred vision.   Respiratory: Negative for cough and shortness of breath.    Cardiovascular: Positive for leg swelling. Negative for chest pain.   Gastrointestinal: Negative for abdominal pain, constipation, diarrhea, nausea and vomiting.   Genitourinary: Negative for dysuria.   Musculoskeletal: Positive for myalgias.   Neurological: Negative for headaches.   Psychiatric/Behavioral: Positive confusion and she has hallucinations at times.  .      Patient Active Problem List   Diagnosis    Personal history of malignant neoplasm of breast    Mononeuritis of upper limb    Hyperlipidemia associated with type 2 diabetes mellitus    Gastropathy    Tremor    Nuclear sclerosis    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    Aneurysm of descending thoracic aorta without rupture    Aortic atherosclerosis    History of CVA with residual deficit    Osteopenia    PAD (peripheral artery disease)    Liver mass    Recurrent major depressive disorder, in full remission    Statin intolerance    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)    Pulmonary heart disease    Ectatic aorta    Tortuous aorta    Budd-Chiari syndrome    PVC (premature ventricular contraction)    Multiple myeloma in relapse    Metastatic multiple " "myeloma to bone    Status post autologous bone marrow transplant    Abnormal PFT    Infection due to human metapneumovirus (hMPV)    Iron deficiency anemia due to chronic blood loss    History of colon polyps    Drug-induced polyneuropathy    Hypertension associated with diabetes    Multiple myeloma in remission    Anemia in neoplastic disease    Hemiplegia and hemiparesis following cerebral infarction affecting right dominant side    Abnormal findings on diagnostic imaging of liver and biliary tract     Type 2 diabetes mellitus with diabetic peripheral angiopathy without gangrene, with long-term current use of insulin    History of auto stem cell transplant    Chronic obstructive pulmonary disease, unspecified COPD type    Mild neurocognitive disorder due to multiple etiologies    Immunocompromised    Mild vascular dementia    Stage 3a chronic kidney disease          Objective:          /72 (BP Location: Right arm, Patient Position: Sitting, BP Method: Medium (Manual))   Pulse 62   Ht 5' 2" (1.575 m)   Wt 57.8 kg (127 lb 6.8 oz)   LMP  (LMP Unknown)   SpO2 97%   BMI 23.31 kg/m²                    Physical Exam  Off o2- O2 97 %.    Constitutional:       Appearance: Normal appearance.      Comments: Pleasant, in good spirits.  Little silly- she is confabulating answers to questions so she is not reliable- repeating questions can give different answers.    HENT:      Head: Normocephalic and atraumatic.      Nose: Nose normal.      Mouth/Throat:      Mouth: Mucous membranes are moist.   Eyes:      General: No scleral icterus.     Conjunctiva/sclera: Conjunctivae normal.   Cardiovascular:      Rate and Rhythm: Normal rate and regular rhythm.      Pulses: Normal pulses.      Heart sounds: Normal heart sounds. No murmur heard.  Pulmonary:      Effort: Pulmonary effort is normal. No respiratory distress.      Breath sounds: Normal breath sounds. No wheezing or rales.   Abdominal:      General: Abdomen is flat. " "There is no distension.      Palpations: Abdomen is soft.      Tenderness: There is no abdominal tenderness. There is no guarding.   Musculoskeletal:         General: No swelling, tenderness or deformity.      Cervical back: Normal range of motion. No rigidity.      Right lower leg: No edema.      Left lower leg: No edema.    twitchy right arms--         Skin:     General: Skin is warm and dry.      Coloration: Skin is not jaundiced.   Neurological:      General: she has a tremor on the right upper extremity.      Mental Status: She is alert and oriented to person, place, and time.  Having visual hallucinations.     Psychiatric:         Mood and Affect: Mood normal.         Behavior:  Little silly- she is confabulating answers to questions so she is not reliable- repeating questions can give different answers.  NUrse went by singing out side door and she says " I taught her that dance"        Protective Sensation (w/ 10 gram monofilament):  Right: Intact  Left: Intact    Visual Inspection:  Normal -  Bilateral    Pedal Pulses:   Right: Present  Left: Present    Posterior Tibialis Pulses:   Right:Present  Left: Present                   Assessment and Plan:             Type 2 diabetes mellitus without complication- doing well.       Multiple myeloma - being treated-- following with ONC-      Metastatic multiple myeloma to bone     Pulmonary heart disease-- o2 sats ok-- off O2 - still use occassionally       Type 2 diabetes mellitus with diabetic peripheral angiopathy without gangrene, with long-term current use of insulin-- doing fine      Thrombocytopenia, unspecified-- stable-  plt count 159  onc monitoring             Anemia in neoplastic disease     History of auto stem cell transplant     Chronic obstructive pulmonary disease, unspecified COPD type-- doing well      From CT on 4/25/2022  Was reviewed        Descending  Thoracic Aneurysm---- Stable appearance of saccular aneurysmal dilatation at the distal " thoracic aorta near the level of diaphragm with associated mural thrombus not significantly changed.  Reviewed Vascular note  Due for CT scan         Plan:     She has COPD with a lot of emphysematous changes of her lungs and she has pulmonary heart disease.  I believe the oxygen would be beneficial to work to decrease her risk of right heart failure..  Last visit suggested trial at home and see it this helped.   She thinks it helps her confusion.             Weight lost graduate-- suggest ensure daily with 2 regular meals, daibetes is well controled-- discussed ensure.      I will see her back again in 6  months or sooner if needed.  [facility] to send Aid and PT ad Sw out to help family plan    Our office providers are the focal point for all needed health care services that are part of ongoing care related to a patient's single serious condition or the patient's complex conditions.

## 2024-04-18 NOTE — PLAN OF CARE
Did well with venofer and kenia  
How Severe Is Your Skin Lesion?: mild
Is This A New Presentation, Or A Follow-Up?: Skin Lesion

## 2024-04-29 NOTE — TELEPHONE ENCOUNTER
No care due was identified.  [facility] Embedded Care Due Messages. Reference number: [id number].   4/28/2024 9:59:25 PM CDT

## 2024-04-30 ENCOUNTER — OFFICE VISIT (OUTPATIENT)
Dept: HEMATOLOGY/ONCOLOGY | Facility: CLINIC | Age: 74
End: 2024-04-30
Payer: MEDICARE

## 2024-04-30 VITALS
OXYGEN SATURATION: 97 % | HEART RATE: 50 BPM | RESPIRATION RATE: 16 BRPM | SYSTOLIC BLOOD PRESSURE: 135 MMHG | WEIGHT: 127.56 LBS | BODY MASS INDEX: 23.47 KG/M2 | HEIGHT: 62 IN | DIASTOLIC BLOOD PRESSURE: 65 MMHG | TEMPERATURE: 98 F

## 2024-04-30 DIAGNOSIS — D84.821 IMMUNODEFICIENCY DUE TO DRUGS: ICD-10-CM

## 2024-04-30 DIAGNOSIS — D50.9 IRON DEFICIENCY ANEMIA, UNSPECIFIED IRON DEFICIENCY ANEMIA TYPE: ICD-10-CM

## 2024-04-30 DIAGNOSIS — F03.90 DEMENTIA, UNSPECIFIED DEMENTIA SEVERITY, UNSPECIFIED DEMENTIA TYPE, UNSPECIFIED WHETHER BEHAVIORAL, PSYCHOTIC, OR MOOD DISTURBANCE OR ANXIETY: ICD-10-CM

## 2024-04-30 DIAGNOSIS — Z94.84 HISTORY OF AUTO STEM CELL TRANSPLANT: ICD-10-CM

## 2024-04-30 DIAGNOSIS — C90.00 MULTIPLE MYELOMA, REMISSION STATUS UNSPECIFIED: Primary | ICD-10-CM

## 2024-04-30 DIAGNOSIS — Z79.899 IMMUNODEFICIENCY DUE TO DRUGS: ICD-10-CM

## 2024-04-30 PROCEDURE — 3061F NEG MICROALBUMINURIA REV: CPT | Mod: HCNC,CPTII,S$GLB, | Performed by: INTERNAL MEDICINE

## 2024-04-30 PROCEDURE — 99999 PR PBB SHADOW E&M-EST. PATIENT-LVL III: CPT | Mod: PBBFAC,HCNC,, | Performed by: INTERNAL MEDICINE

## 2024-04-30 PROCEDURE — 4010F ACE/ARB THERAPY RXD/TAKEN: CPT | Mod: HCNC,CPTII,S$GLB, | Performed by: INTERNAL MEDICINE

## 2024-04-30 PROCEDURE — 3075F SYST BP GE 130 - 139MM HG: CPT | Mod: HCNC,CPTII,S$GLB, | Performed by: INTERNAL MEDICINE

## 2024-04-30 PROCEDURE — 3044F HG A1C LEVEL LT 7.0%: CPT | Mod: HCNC,CPTII,S$GLB, | Performed by: INTERNAL MEDICINE

## 2024-04-30 PROCEDURE — 3066F NEPHROPATHY DOC TX: CPT | Mod: HCNC,CPTII,S$GLB, | Performed by: INTERNAL MEDICINE

## 2024-04-30 PROCEDURE — 3078F DIAST BP <80 MM HG: CPT | Mod: HCNC,CPTII,S$GLB, | Performed by: INTERNAL MEDICINE

## 2024-04-30 PROCEDURE — 3288F FALL RISK ASSESSMENT DOCD: CPT | Mod: HCNC,CPTII,S$GLB, | Performed by: INTERNAL MEDICINE

## 2024-04-30 PROCEDURE — 1101F PT FALLS ASSESS-DOCD LE1/YR: CPT | Mod: HCNC,CPTII,S$GLB, | Performed by: INTERNAL MEDICINE

## 2024-04-30 PROCEDURE — 99215 OFFICE O/P EST HI 40 MIN: CPT | Mod: HCNC,S$GLB,, | Performed by: INTERNAL MEDICINE

## 2024-04-30 PROCEDURE — 1126F AMNT PAIN NOTED NONE PRSNT: CPT | Mod: HCNC,CPTII,S$GLB, | Performed by: INTERNAL MEDICINE

## 2024-04-30 PROCEDURE — 3072F LOW RISK FOR RETINOPATHY: CPT | Mod: HCNC,CPTII,S$GLB, | Performed by: INTERNAL MEDICINE

## 2024-04-30 PROCEDURE — 1159F MED LIST DOCD IN RCRD: CPT | Mod: HCNC,CPTII,S$GLB, | Performed by: INTERNAL MEDICINE

## 2024-04-30 RX ORDER — METFORMIN HYDROCHLORIDE 1000 MG/1
1000 TABLET ORAL 2 TIMES DAILY WITH MEALS
Qty: 180 TABLET | Refills: 1 | Status: SHIPPED | OUTPATIENT
Start: 2024-04-30

## 2024-04-30 NOTE — Clinical Note
-kenia injection on 5/13 -cbc, cmp, serum free light chains, quantitative immunoglobulins, serum electropheresis, serum immunofixation, ferritin lab, MD appt,  and kenia injection on 6/10; please reschedule pt emily root to next open slot to make room for mrs. Thompson

## 2024-04-30 NOTE — TELEPHONE ENCOUNTER
Refill Decision Note   Shelby Thompson  is requesting a refill authorization.  Brief Assessment and Rationale for Refill:  Approve     Medication Therapy Plan:         Pharmacist review requested: Yes   Extended chart review required: Yes   Comments:     Note composed:1:59 AM 04/30/2024

## 2024-04-30 NOTE — PROGRESS NOTES
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  She achieved and tolerated well VRd x completing 7, 28 day cycles and achieving deep VGPR to induction. Then underwent Melphalan autologous stem cell transplant on 2/12/2020.      Extensive PMH as below.    2 prior CVAs (one in 2008, one in 2011)  L breast cancer DCIS stage 0 (s/p lumpectomy and radiation, no endocrine tx due to CVA)  HTN  DM II  Neuropathy, b/l hands  Prior smoker, quit in 2011  Hepatic lesions - vascular per recent MRI in 09 /2019 with no changes; will confirm no further rascon needed from help  Statin Intolerance - on praluent, PCSK9 inhibitor  HFpEF - EF 55%, diastolic dysfunction  Anxiety/Depression     ADMISSION SUMMARY: 2/10-2/26/2020  Admitted 2/10, tolerated chemo and transplant well. Engrafted on day +12. Remained afebrile throughout hospital stay and no mucositis. Experienced expected side effects of nausea and diarrhea controlled with antiemetics and Imodium. Discharged home with home PT/OT       Subjective:       Initial History - 4/30/24  Ms. Thompson is a 74 y.o. female who presents for  follow up.  She is 3 yrs 2  months post AutoSCT. Relapsed  at approximately  1 year post sct and was on Sravanthi/zhanna/dex until summer 2022 when changed to sravanthi/pom/dex due to biochemical only progression. Dex stopped due to mood issues.  Remains on sravanthi/pom  salvage. Tolerating dose adjusted pomalyst.  Per friend accompanying  Her dementia continues to worsen  from last appt.  Infirmed  who is her caretaker having issues caring for her at  home.  They have established neurologist.       Allergies:  Review of patient's allergies indicates:   Allergen Reactions    Lipitor [atorvastatin] Itching     Itching, elevated cpk, muscle aches, statin induced myositis       Medications:  Current Outpatient Medications   Medication Sig Dispense Refill    acyclovir (ZOVIRAX) 400 MG tablet TAKE 1 TABLET BY MOUTH TWICE A  tablet 3    amLODIPine (NORVASC) 5 MG tablet TAKE 1 TABLET BY MOUTH EVERY DAY 90 tablet 2    aspirin (ECOTRIN) 81 MG EC tablet TAKE 1 TABLET BY MOUTH EVERY DAY 90 tablet 3    b complex vitamins (B COMPLEX-VITAMIN B12) tablet Take 1 tablet by mouth once daily. 30 tablet 3    cholecalciferol, vitamin D3, 125 mcg (5,000 unit) Tab 1 tablet DAILY (route: oral)      cyanocobalamin, vitamin B-12, 5,000 mcg Subl Place one under tongue once a day for 1 month, then continue to take under tongue per week 30 tablet 3    DARZALEX FASPRO 1,800 mg-30,000 unit/15 mL Soln       donepeziL (ARICEPT) 5 MG tablet Take 1 tablet (5 mg total) by mouth every evening. 90 tablet 2    ENGERIX-B, PF, 20 mcg/mL Syrg       evolocumab (REPATHA SURECLICK) 140 mg/mL PnIj Inject 1 mL (140 mg total) into the skin every 14 (fourteen) days. 6 mL 3    ferrous gluconate 324 mg (37.5 mg iron) Tab tablet 1 tablet DAILY (route: oral)      gabapentin (NEURONTIN) 300 MG capsule Take 1 capsule (300 mg total) by mouth 2 (two) times daily. 180 capsule 2    GADAVIST 1 mmol/mL (604.72 mg/mL) Soln injection       magnesium oxide (MAG-OX) 400 mg (241.3 mg magnesium) tablet TAKE 2 TABLETS BY MOUTH 2 TIMES DAILY. 360 tablet 1    metFORMIN (GLUCOPHAGE) 1000 MG tablet Take 1 tablet (1,000 mg total) by mouth 2 (two) times daily with meals. 180 tablet 1    omeprazole (PRILOSEC) 40 MG capsule TAKE 1 CAPSULE BY MOUTH EVERY DAY 90 capsule 1    pomalidomide 2 mg Cap TAKE 1 CAPSULE (2MG) BY MOUTH EVERY DAY on days 1-21 of a 28 day cycle.  [facility] # [id number] 3/28/24. 21 capsule 0    potassium chloride SA (K-DUR,KLOR-CON) 20 MEQ tablet       potassium  chloride SA (K-DUR,KLOR-CON) 20 MEQ tablet Take 1 tablet (20 mEq total) by mouth once daily. 30 tablet 11    sertraline (ZOLOFT) 50 MG tablet Take 1 tablet (50 mg total) by mouth once daily. 90 tablet 3    suvorexant (BELSOMRA) 10 mg Tab Take 1 tablet by mouth every evening. 30 tablet 3    valsartan (DIOVAN) 160 MG tablet Take 1 tablet (160 mg total) by mouth once daily. 90 tablet 3    VENOFER 100 mg iron/5 mL injection       zoledronic 4 mg/100 mL PgBk infusion       zoledronic acid (ZOMETA) 4 mg/5 mL injection       albuterol (ACCUNEB) 1.25 mg/3 mL Nebu Take 3 mLs (1.25 mg total) by nebulization every 6 (six) hours as needed (wheeze/cough). Rescue (Patient not taking: Reported on 4/30/2024) 75 mL 2    amoxicillin-clavulanate 875-125mg (AUGMENTIN) 875-125 mg per tablet Take 1 tablet by mouth 2 (two) times daily. (Patient not taking: Reported on 3/28/2024) 14 tablet 0    loperamide (IMODIUM) 2 mg capsule TAKE 1 CAPSULE BY MOUTH 4 TIMES DAILY AS NEEDED FOR DIARRHEA. (Patient not taking: Reported on 4/30/2024) 60 capsule 0    OXYGEN-AIR DELIVERY SYSTEMS MISC 1-2 Liter O2 - CONTINUOUS (route: Oxygen) (Patient not taking: Reported on 4/30/2024)       No current facility-administered medications for this visit.      See HPI     ECOG Performance Status: 2 (due to remote  CVA)   Objective:      Vitals:   Vitals:    04/30/24 0823   BP: 135/65   Pulse: (!) 50   Resp: 16   Temp: 98 °F (36.7 °C)     General - somewhat frail appearing, no apparent distress  HEENT - oropharynx clear  Chest and Lung - clear to auscultation bilaterally   Cardiovascular - RRR with no MGR, normal S1 and S2  Abdomen-  soft, nontender, no palpable hepatomegaly or splenomegaly  Lymph - no palpable lymphadenopathy  Heme - no bruising, petechiae, pallor  Skin - no rashes or lesions  Psych - appropriate mood and affect      Lab Results   Component Value Date    WBC 2.73 (L) 04/30/2024    HGB 10.2 (L) 04/30/2024    HCT 32.8 (L) 04/30/2024    MCV 87  04/30/2024     (L) 04/30/2024       Gran # (ANC)   Date Value Ref Range Status   04/30/2024 1.3 (L) 1.8 - 7.7 K/uL Final     Gran %   Date Value Ref Range Status   04/30/2024 45.7 38.0 - 73.0 % Final     CMP  Sodium   Date Value Ref Range Status   04/15/2024 143 136 - 145 mmol/L Final     Potassium   Date Value Ref Range Status   04/15/2024 4.4 3.5 - 5.1 mmol/L Final     Chloride   Date Value Ref Range Status   04/15/2024 109 95 - 110 mmol/L Final     CO2   Date Value Ref Range Status   04/15/2024 26 23 - 29 mmol/L Final     Glucose   Date Value Ref Range Status   04/15/2024 92 70 - 110 mg/dL Final     BUN   Date Value Ref Range Status   04/15/2024 17 8 - 23 mg/dL Final     Creatinine   Date Value Ref Range Status   04/15/2024 0.8 0.5 - 1.4 mg/dL Final     Calcium   Date Value Ref Range Status   04/15/2024 9.3 8.7 - 10.5 mg/dL Final     Total Protein   Date Value Ref Range Status   04/15/2024 6.7 6.0 - 8.4 g/dL Final     Albumin   Date Value Ref Range Status   04/15/2024 3.9 3.5 - 5.2 g/dL Final     Total Bilirubin   Date Value Ref Range Status   04/15/2024 0.3 0.1 - 1.0 mg/dL Final     Comment:     For infants and newborns, interpretation of results should be based  on gestational age, weight and in agreement with clinical  observations.    Premature Infant recommended reference ranges:  Up to 24 hours.............<8.0 mg/dL  Up to 48 hours............<12.0 mg/dL  3-5 days..................<15.0 mg/dL  6-29 days.................<15.0 mg/dL       Alkaline Phosphatase   Date Value Ref Range Status   04/15/2024 50 (L) 55 - 135 U/L Final     AST   Date Value Ref Range Status   04/15/2024 11 10 - 40 U/L Final     ALT   Date Value Ref Range Status   04/15/2024 7 (L) 10 - 44 U/L Final     Anion Gap   Date Value Ref Range Status   04/15/2024 8 8 - 16 mmol/L Final     eGFR   Date Value Ref Range Status   04/15/2024 >60.0 >60 mL/min/1.73 m^2 Final     IgG   Date Value Ref Range Status   04/15/2024 1160 650 - 1600  mg/dL Final     Comment:     IgG Cord Blood Reference Range: 650-1600 mg/dL.     IgA   Date Value Ref Range Status   04/15/2024 102 40 - 350 mg/dL Final     Comment:     IgA Cord Blood Reference Range: <5 mg/dL.     IgM   Date Value Ref Range Status   04/15/2024 14 (L) 50 - 300 mg/dL Final     Comment:     IgM Cord Blood Reference Range: <25 mg/dL.     Kappa Free Light Chains   Date Value Ref Range Status   04/15/2024 8.66 (H) 0.33 - 1.94 mg/dL Final   03/04/2024 13.64 (H) 0.33 - 1.94 mg/dL Final   02/09/2024 12.16 (H) 0.33 - 1.94 mg/dL Final     Lambda Free Light Chains   Date Value Ref Range Status   04/15/2024 1.67 0.57 - 2.63 mg/dL Final   03/04/2024 1.45 0.57 - 2.63 mg/dL Final   02/09/2024 1.74 0.57 - 2.63 mg/dL Final     Kappa/Lambda FLC Ratio   Date Value Ref Range Status   04/15/2024 5.19 (H) 0.26 - 1.65 Final     Comment:     Undetected antigen excess is a rare event but cannot   be excluded. If these free light chain results do not   agree with other clinical or laboratory findings or   if the sample is from a patient that has previously   demonstrated antigen excess, discuss with the testing   laboratory.   Results should always be interpreted in conjunction   with other laboratory tests and clinical evidence.     03/04/2024 9.41 (H) 0.26 - 1.65 Final     Comment:     Undetected antigen excess is a rare event but cannot   be excluded. If these free light chain results do not   agree with other clinical or laboratory findings or   if the sample is from a patient that has previously   demonstrated antigen excess, discuss with the testing   laboratory.   Results should always be interpreted in conjunction   with other laboratory tests and clinical evidence.     02/09/2024 6.99 (H) 0.26 - 1.65 Final     Comment:     Undetected antigen excess is a rare event but cannot   be excluded. If these free light chain results do not   agree with other clinical or laboratory findings or   if the sample is from a  patient that has previously   demonstrated antigen excess, discuss with the testing   laboratory.   Results should always be interpreted in conjunction   with other laboratory tests and clinical evidence.       Pathologist Interpretation SPE   Date Value Ref Range Status   04/15/2024 REVIEWED  Final     Comment:       Electronically reviewed and signed by:  Shelli Haynes MD  Signed on 04/17/24 at 09:05  Normal total protein. Normal gamma globulins are decreased. There is   a paraprotein band in gamma = 0.50 g/dL, previously 0.44 g/dL.      03/04/2024 REVIEWED  Final     Comment:       Electronically reviewed and signed by:  Shelli Haynes MD  Signed on 03/06/24 at 10:13  Normal total protein. Normal gamma globulins are decreased. There is   a paraprotein band in gamma = 0.44 g/dL, previously 0.39 g/dL.      02/09/2024 REVIEWED  Final     Comment:       Electronically reviewed and signed by:  Shelli Haynes MD  Signed on 02/12/24 at 13:48  Normal total protein. Normal gamma globulins are decreased. There is   a paraprotein band in gamma = 0.39 g/dL, previously 0.40 g/dL.        Pathologist Interpretation NATALY   Date Value Ref Range Status   03/04/2024 REVIEWED  Final     Comment:       Electronically reviewed and signed by:  Shelli Haynes MD  Signed on 03/06/24 at 10:12  IgG kappa specific monoclonal band present.          Lab Results   Component Value Date    UIBC 372 (H) 07/13/2012    IRON 49 04/30/2019    TRANSFERRIN 202 04/30/2019    TIBC 299 04/30/2019    FESATURATED 16 (L) 04/30/2019      Lab Results   Component Value Date    FERRITIN 201 01/12/2024       Assessment:       Ms. Thompson is a  74 y.o. femal with relapsed multiple myeloma.     Plan:     1. MM s/p Auto SCT  - high risk MM with 17p deletion  - 3  year 2  months  s/p Auto SCT  - started maintenance q2w velcade 5/6/20   - serial biochemical relapse  Noted and  given this and high risk CG , transitioned to Sravanthi/Noble (1mg/m2) /dex   "Salvage July 2021    - Due to uncontrolled hyperglycemia dex stopped after C4.    -supportive meds:   continue ppx acyclovir, asa; resume maintenance zometa q 3 months ;  she is past due to for next dose; will schedule with next sravanthi injection   -she  demonstrated mild biochemical progression over summer 2022 studies with no CRAB, in light of this and high risk CG   we transitioned her therapy from Sravanthi/Velcade to Sravanthi/pomalyst; intolerant to dex.     -Pomalyst dose decreased to  to 2mg (5/19/23) due to persistent neutropenia  now improved   -- biochemical studies from   4/15/24 cw no significant progression or CRAB; myeloma labs linked and drawn today unnecessarily today ; will fu on these  -recommended she take prn imodium for occasional loose stool with pomalyst   -already on asa 81mg   -post transplant vaccines completed   -continue q 4 week labs/sravanthi and q 8 week MD appt     2. Anemia due to chemotherapy/JESSE  Due to therapy and JESSE  Completed venofer course dec 2023; jan 2024 iron studies normal   monitor iron studie q 4  months for repeat iron needs   Check ferritin with next labs     3. Neuropathy  -Stable   - continues gabapentin and prn tramadol     4. SOB/descending aortic aneurysm  - CTA and dopplers ordered urgently with high concern for DVT/PE; completed 8/3/20  - incidental descending thoracic aortic aneurysm noted; referred to thoracic surgery; seen 8/7/20; per note: "at current size would not recommend any intervention as risk of rupture remains low.  Recommend surveillance CT chest every 12 months to monitor growth of the aneurysm.  Would typically recommend aspirin 81 mg daily in patients with aortic aneurysm  - ASA started (9/14/20)     5. HTN  - continue norvasc  - Patient to monitor BP closely    6. Dementia/anxiety  -worsened but still oriented and appropriate today   -mgmt per neurology and pcp   -discussed referral and consideration for nursing home/memory care placement but per friend "  declined  -will defer discussion for placement to pcp/neurology  -they desire to continue cancer therapy for now   - contniue fu with neuro/psych;  defer medication mgmt to them             FU:   -kenia injection on 5/13  -cbc, cmp, serum free light chains, quantitative immunoglobulins, serum electropheresis, serum immunofixation, ferritin lab, MD appt,  and kenia injection on 6/10; please reschedule pt emily root to next open slot to make room for mrs. Thompson

## 2024-04-30 NOTE — TELEPHONE ENCOUNTER
Refill Routing Note   Medication(s) are not appropriate for processing by [facility] for the following reason(s):      Drug-disease interaction    [facility] action(s):  Defer Care Due:  None identified     Medication Therapy Plan: metFORMIN and Budd-Chiari syndrome; Liver mass; Abnormal findings on diagnostic imaging of liver and biliary tract    Pharmacist review requested: Yes     Appointments  past 12m or future 3m with PCP    Date Provider   Last Visit   4/18/2024 Emanuel Arevalo Jr., MD   Next Visit   8/19/2024 Emanuel Arevalo Jr., MD   ED visits in past 90 days: 0        Note composed:8:24 PM 04/29/2024

## 2024-05-01 DIAGNOSIS — T50.905A DRUG-INDUCED CYTOPENIA: ICD-10-CM

## 2024-05-01 DIAGNOSIS — D75.9 DRUG-INDUCED CYTOPENIA: ICD-10-CM

## 2024-05-01 DIAGNOSIS — C90.00 MULTIPLE MYELOMA, REMISSION STATUS UNSPECIFIED: ICD-10-CM

## 2024-05-01 NOTE — TELEPHONE ENCOUNTER
"----- Message from Elsy Webber sent at 5/1/2024  2:48 PM CDT -----  RX Name and Strength:        pomalidomide 2 mg Cap    Preferred Pharmacy with phone number:    [facility] - [place], [place] - [address]  [address] [address]  Phone: [phone] Fax: [phone]       Additional Information:  "Thank you for all that you do for our patients"  "

## 2024-05-02 ENCOUNTER — HOSPITAL ENCOUNTER (INPATIENT)
Facility: HOSPITAL | Age: 74
LOS: 1 days | Discharge: HOME HEALTH | DRG: 309 | End: 2024-05-02
Attending: STUDENT IN AN ORGANIZED HEALTH CARE EDUCATION/TRAINING PROGRAM | Admitting: STUDENT IN AN ORGANIZED HEALTH CARE EDUCATION/TRAINING PROGRAM
Payer: MEDICARE

## 2024-05-02 VITALS
OXYGEN SATURATION: 96 % | TEMPERATURE: 98 F | SYSTOLIC BLOOD PRESSURE: 126 MMHG | HEIGHT: 62 IN | WEIGHT: 127 LBS | HEART RATE: 45 BPM | DIASTOLIC BLOOD PRESSURE: 56 MMHG | BODY MASS INDEX: 23.37 KG/M2 | RESPIRATION RATE: 20 BRPM

## 2024-05-02 DIAGNOSIS — R07.9 CHEST PAIN: ICD-10-CM

## 2024-05-02 DIAGNOSIS — R55 SYNCOPE: ICD-10-CM
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
## 2024-05-02 DIAGNOSIS — R00.1 BRADYCARDIA: Primary | ICD-10-CM

## 2024-05-02 LAB
ALBUMIN SERPL BCP-MCNC: 3.5 G/DL (ref 3.5–5.2)
ALP SERPL-CCNC: 40 U/L (ref 55–135)
ALT SERPL W/O P-5'-P-CCNC: 7 U/L (ref 10–44)
ANION GAP SERPL CALC-SCNC: 5 MMOL/L (ref 8–16)
ASCENDING AORTA: 4.05 CM
AST SERPL-CCNC: 10 U/L (ref 10–40)
AV INDEX (PROSTH): 0.81
AV MEAN GRADIENT: 3 MMHG
AV PEAK GRADIENT: 6 MMHG
AV VALVE AREA BY VELOCITY RATIO: 2.12 CM²
AV VALVE AREA: 2.52 CM²
AV VELOCITY RATIO: 0.68
BASOPHILS # BLD AUTO: 0.03 K/UL (ref 0–0.2)
BASOPHILS NFR BLD: 1.3 % (ref 0–1.9)
BILIRUB SERPL-MCNC: 0.4 MG/DL (ref 0.1–1)
BILIRUB UR QL STRIP: NEGATIVE
BNP SERPL-MCNC: 166 PG/ML (ref 0–99)
BSA FOR ECHO PROCEDURE: 1.59 M2
BUN SERPL-MCNC: 13 MG/DL (ref 8–23)
BUN SERPL-MCNC: 29 MG/DL (ref 6–30)
CALCIUM SERPL-MCNC: 9.5 MG/DL (ref 8.7–10.5)
CHLORIDE SERPL-SCNC: 101 MMOL/L (ref 95–110)
CHLORIDE SERPL-SCNC: 106 MMOL/L (ref 95–110)
CK SERPL-CCNC: 35 U/L (ref 20–180)
CLARITY UR REFRACT.AUTO: ABNORMAL
CO2 SERPL-SCNC: 29 MMOL/L (ref 23–29)
COLOR UR AUTO: YELLOW
CREAT SERPL-MCNC: 0.8 MG/DL (ref 0.5–1.4)
CREAT SERPL-MCNC: 2 MG/DL (ref 0.5–1.4)
CV ECHO LV RWT: 0.37 CM
DIFFERENTIAL METHOD BLD: ABNORMAL
DOP CALC AO PEAK VEL: 1.26 M/S
DOP CALC AO VTI: 29.96 CM
DOP CALC LVOT AREA: 3.1 CM2
DOP CALC LVOT DIAMETER: 1.99 CM
DOP CALC LVOT PEAK VEL: 0.86 M/S
DOP CALC LVOT STROKE VOLUME: 75.54 CM3
DOP CALCLVOT PEAK VEL VTI: 24.3 CM
E WAVE DECELERATION TIME: 348.62 MSEC
E/A RATIO: 0.93
E/E' RATIO: 15.83 M/S
ECHO LV POSTERIOR WALL: 0.76 CM (ref 0.6–1.1)
EJECTION FRACTION: 60 %
EOSINOPHIL # BLD AUTO: 0.2 K/UL (ref 0–0.5)
EOSINOPHIL NFR BLD: 8.1 % (ref 0–8)
ERYTHROCYTE [DISTWIDTH] IN BLOOD BY AUTOMATED COUNT: 16 % (ref 11.5–14.5)
EST. GFR  (NO RACE VARIABLE): >60 ML/MIN/1.73 M^2
FRACTIONAL SHORTENING: 39 % (ref 28–44)
GLUCOSE SERPL-MCNC: 250 MG/DL (ref 70–110)
GLUCOSE SERPL-MCNC: 73 MG/DL (ref 70–110)
GLUCOSE UR QL STRIP: NEGATIVE
HCT VFR BLD AUTO: 30.5 % (ref 37–48.5)
HCT VFR BLD CALC: 26 %PCV (ref 36–54)
HCV AB SERPL QL IA: NORMAL
HGB BLD-MCNC: 9.3 G/DL (ref 12–16)
HGB UR QL STRIP: NEGATIVE
HIV 1+2 AB+HIV1 P24 AG SERPL QL IA: NORMAL
IMM GRANULOCYTES # BLD AUTO: 0.01 K/UL (ref 0–0.04)
IMM GRANULOCYTES NFR BLD AUTO: 0.4 % (ref 0–0.5)
INTERVENTRICULAR SEPTUM: 1.15 CM (ref 0.6–1.1)
KETONES UR QL STRIP: NEGATIVE
LA MAJOR: 5.88 CM
LA MINOR: 5.36 CM
LA WIDTH: 4.12 CM
LEFT ATRIUM SIZE: 4.49 CM
LEFT ATRIUM VOLUME INDEX MOD: 54.9 ML/M2
LEFT ATRIUM VOLUME INDEX: 55.8 ML/M2
LEFT ATRIUM VOLUME MOD: 86.69 CM3
LEFT ATRIUM VOLUME: 88.18 CM3
LEFT INTERNAL DIMENSION IN SYSTOLE: 2.51 CM (ref 2.1–4)
LEFT VENTRICLE DIASTOLIC VOLUME INDEX: 46.73 ML/M2
LEFT VENTRICLE DIASTOLIC VOLUME: 73.84 ML
LEFT VENTRICLE MASS INDEX: 78 G/M2
LEFT VENTRICLE SYSTOLIC VOLUME INDEX: 14.3 ML/M2
LEFT VENTRICLE SYSTOLIC VOLUME: 22.6 ML
LEFT VENTRICULAR INTERNAL DIMENSION IN DIASTOLE: 4.09 CM (ref 3.5–6)
LEFT VENTRICULAR MASS: 123.39 G
LEUKOCYTE ESTERASE UR QL STRIP: NEGATIVE
LV LATERAL E/E' RATIO: 11.88 M/S
LV SEPTAL E/E' RATIO: 23.75 M/S
LYMPHOCYTES # BLD AUTO: 0.6 K/UL (ref 1–4.8)
LYMPHOCYTES NFR BLD: 26.9 % (ref 18–48)
MCH RBC QN AUTO: 27 PG (ref 27–31)
MCHC RBC AUTO-ENTMCNC: 30.5 G/DL (ref 32–36)
MCV RBC AUTO: 88 FL (ref 82–98)
MONOCYTES # BLD AUTO: 0.5 K/UL (ref 0.3–1)
MONOCYTES NFR BLD: 20.5 % (ref 4–15)
MV PEAK A VEL: 1.02 M/S
MV PEAK E VEL: 0.95 M/S
MV STENOSIS PRESSURE HALF TIME: 101.1 MS
MV VALVE AREA P 1/2 METHOD: 2.18 CM2
NEUTROPHILS # BLD AUTO: 1 K/UL (ref 1.8–7.7)
NEUTROPHILS NFR BLD: 42.8 % (ref 38–73)
NITRITE UR QL STRIP: NEGATIVE
NRBC BLD-RTO: 0 /100 WBC
OHS QRS DURATION: 82 MS
OHS QTC CALCULATION: 386 MS
PH UR STRIP: 8 [PH] (ref 5–8)
PISA TR MAX VEL: 3.22 M/S
PLATELET # BLD AUTO: 100 K/UL (ref 150–450)
PMV BLD AUTO: 11.1 FL (ref 9.2–12.9)
POC IONIZED CALCIUM: 1.21 MMOL/L (ref 1.06–1.42)
POC TCO2 (MEASURED): 27 MMOL/L (ref 23–29)
POCT GLUCOSE: 62 MG/DL (ref 70–110)
POTASSIUM BLD-SCNC: 4 MMOL/L (ref 3.5–5.1)
POTASSIUM SERPL-SCNC: 4.5 MMOL/L (ref 3.5–5.1)
PROT SERPL-MCNC: 6.2 G/DL (ref 6–8.4)
PROT UR QL STRIP: NEGATIVE
RA MAJOR: 4.64 CM
RA PRESSURE ESTIMATED: 8 MMHG
RA WIDTH: 4.08 CM
RBC # BLD AUTO: 3.45 M/UL (ref 4–5.4)
RV TB RVSP: 11 MMHG
SAMPLE: ABNORMAL
SINUS: 2.6 CM
SODIUM BLD-SCNC: 140 MMOL/L (ref 136–145)
SODIUM SERPL-SCNC: 140 MMOL/L (ref 136–145)
SP GR UR STRIP: 1.01 (ref 1–1.03)
STJ: 2.6 CM
TDI LATERAL: 0.08 M/S
TDI SEPTAL: 0.04 M/S
TDI: 0.06 M/S
TR MAX PG: 41 MMHG
TRICUSPID ANNULAR PLANE SYSTOLIC EXCURSION: 3.14 CM
TROPONIN I SERPL DL<=0.01 NG/ML-MCNC: <0.006 NG/ML (ref 0–0.03)
TV REST PULMONARY ARTERY PRESSURE: 49 MMHG
URN SPEC COLLECT METH UR: ABNORMAL
WBC # BLD AUTO: 2.34 K/UL (ref 3.9–12.7)
Z-SCORE OF LEFT VENTRICULAR DIMENSION IN END DIASTOLE: -1.01
Z-SCORE OF LEFT VENTRICULAR DIMENSION IN END SYSTOLE: -0.88

## 2024-05-02 PROCEDURE — 87389 HIV-1 AG W/HIV-1&-2 AB AG IA: CPT | Mod: HCNC | Performed by: PHYSICIAN ASSISTANT

## 2024-05-02 PROCEDURE — 1111F DSCHRG MED/CURRENT MED MERGE: CPT | Mod: HCNC,CPTII,GC, | Performed by: INTERNAL MEDICINE

## 2024-05-02 PROCEDURE — 99285 EMERGENCY DEPT VISIT HI MDM: CPT | Mod: 25,HCNC

## 2024-05-02 PROCEDURE — 83880 ASSAY OF NATRIURETIC PEPTIDE: CPT | Mod: HCNC

## 2024-05-02 PROCEDURE — 82962 GLUCOSE BLOOD TEST: CPT | Mod: HCNC

## 2024-05-02 PROCEDURE — 99238 HOSP IP/OBS DSCHRG MGMT 30/<: CPT | Mod: HCNC,GC,, | Performed by: INTERNAL MEDICINE

## 2024-05-02 PROCEDURE — 25000003 PHARM REV CODE 250: Mod: HCNC

## 2024-05-02 PROCEDURE — 93010 ELECTROCARDIOGRAM REPORT: CPT | Mod: HCNC,,, | Performed by: INTERNAL MEDICINE

## 2024-05-02 PROCEDURE — 80053 COMPREHEN METABOLIC PANEL: CPT | Mod: HCNC

## 2024-05-02 PROCEDURE — 86803 HEPATITIS C AB TEST: CPT | Mod: HCNC | Performed by: PHYSICIAN ASSISTANT

## 2024-05-02 PROCEDURE — 93005 ELECTROCARDIOGRAM TRACING: CPT | Mod: HCNC

## 2024-05-02 PROCEDURE — 84484 ASSAY OF TROPONIN QUANT: CPT | Mod: HCNC

## 2024-05-02 PROCEDURE — 85025 COMPLETE CBC W/AUTO DIFF WBC: CPT | Mod: HCNC

## 2024-05-02 PROCEDURE — 82550 ASSAY OF CK (CPK): CPT | Mod: HCNC

## 2024-05-02 PROCEDURE — 81003 URINALYSIS AUTO W/O SCOPE: CPT | Mod: HCNC

## 2024-05-02 PROCEDURE — 12000002 HC ACUTE/MED SURGE SEMI-PRIVATE ROOM: Mod: HCNC

## 2024-05-02 RX ORDER — DONEPEZIL HYDROCHLORIDE 5 MG/1
5 TABLET, FILM COATED ORAL NIGHTLY
Status: DISCONTINUED | OUTPATIENT
Start: 2024-05-02 | End: 2024-05-02 | Stop reason: HOSPADM

## 2024-05-02 RX ORDER — AMLODIPINE BESYLATE 5 MG/1
5 TABLET ORAL DAILY
Status: DISCONTINUED | OUTPATIENT
Start: 2024-05-02 | End: 2024-05-02 | Stop reason: HOSPADM

## 2024-05-02 RX ORDER — SERTRALINE HYDROCHLORIDE 50 MG/1
50 TABLET, FILM COATED ORAL DAILY
Status: DISCONTINUED | OUTPATIENT
Start: 2024-05-02 | End: 2024-05-02 | Stop reason: HOSPADM

## 2024-05-02 RX ORDER — NAPROXEN 500 MG/1
500 TABLET ORAL
Status: COMPLETED | OUTPATIENT
Start: 2024-05-02 | End: 2024-05-02

## 2024-05-02 RX ORDER — IBUPROFEN 200 MG
24 TABLET ORAL
Status: DISCONTINUED | OUTPATIENT
Start: 2024-05-02 | End: 2024-05-02 | Stop reason: HOSPADM

## 2024-05-02 RX ORDER — VALSARTAN 160 MG/1
160 TABLET ORAL DAILY
Status: DISCONTINUED | OUTPATIENT
Start: 2024-05-02 | End: 2024-05-02 | Stop reason: HOSPADM

## 2024-05-02 RX ORDER — IBUPROFEN 200 MG
16 TABLET ORAL
Status: DISCONTINUED | OUTPATIENT
Start: 2024-05-02 | End: 2024-05-02 | Stop reason: HOSPADM

## 2024-05-02 RX ORDER — SODIUM CHLORIDE 0.9 % (FLUSH) 0.9 %
10 SYRINGE (ML) INJECTION EVERY 12 HOURS PRN
Status: DISCONTINUED | OUTPATIENT
Start: 2024-05-02 | End: 2024-05-02 | Stop reason: HOSPADM

## 2024-05-02 RX ORDER — NAPROXEN SODIUM 220 MG/1
81 TABLET, FILM COATED ORAL DAILY
Status: DISCONTINUED | OUTPATIENT
Start: 2024-05-02 | End: 2024-05-02 | Stop reason: HOSPADM

## 2024-05-02 RX ORDER — NALOXONE HCL 0.4 MG/ML
0.02 VIAL (ML) INJECTION
Status: DISCONTINUED | OUTPATIENT
Start: 2024-05-02 | End: 2024-05-02 | Stop reason: HOSPADM

## 2024-05-02 RX ORDER — ACYCLOVIR 200 MG/1
400 CAPSULE ORAL 2 TIMES DAILY
Status: DISCONTINUED | OUTPATIENT
Start: 2024-05-02 | End: 2024-05-02 | Stop reason: HOSPADM

## 2024-05-02 RX ORDER — GLUCAGON 1 MG
1 KIT INJECTION
Status: DISCONTINUED | OUTPATIENT
Start: 2024-05-02 | End: 2024-05-02 | Stop reason: HOSPADM

## 2024-05-02 RX ADMIN — ASPIRIN 81 MG CHEWABLE TABLET 81 MG: 81 TABLET CHEWABLE at 09:05

## 2024-05-02 RX ADMIN — ACYCLOVIR 400 MG: 200 CAPSULE ORAL at 08:05

## 2024-05-02 RX ADMIN — SERTRALINE HYDROCHLORIDE 50 MG: 50 TABLET ORAL at 08:05

## 2024-05-02 RX ADMIN — NAPROXEN 500 MG: 500 TABLET ORAL at 06:05

## 2024-05-02 RX ADMIN — VALSARTAN 160 MG: 160 TABLET, FILM COATED ORAL at 08:05

## 2024-05-02 RX ADMIN — AMLODIPINE BESYLATE 5 MG: 5 TABLET ORAL at 08:05

## 2024-05-02 NOTE — Clinical Note
Diagnosis: Bradycardia [405155]   Future Attending Provider: JULIA ESCAMILLA [[id number]]   Reason for IP Medical Treatment  (Clinical interventions that can only be accomplished in the IP setting? ) :: Symptomatic bradycardia   I certify that Inpatient services for greater than or equal to 2 midnights are medically necessary:: Yes   Plans for Post-Acute care--if anticipated (pick the single best option):: A. No post acute care anticipated at this time

## 2024-05-02 NOTE — ASSESSMENT & PLAN NOTE
Patient with significant level of dementia  AAOx2 (person, place)   at bedside, states this is her baseline    -continue donepezil

## 2024-05-02 NOTE — ASSESSMENT & PLAN NOTE
Chronic, stable   reports she is to be using supplemental O2 at home, which she is not  Noted to be hypoxic to low 80s in ED without nasal canual on  2L NC added during interview, with appropriate rebound in saturation  Lungs clear to auscultaion    -continue supplemental O2 goal >88%  -encourage continued use at home  -not on any chronic inhalers

## 2024-05-02 NOTE — ED PROVIDER NOTES
Encounter Date: 5/2/2024       History     Chief Complaint   Patient presents with    Fall     Unwitnessed fall at home. Unknown exact situation due to dementia. Right knee discomfort present     Pt is a 74 year old female with PMHx significant for MM on chemo, HTN, stroke, and dementia who presents for evaluation of an unwitnessed fall. Pt reports feeling lightheaded and unsteady on her feet prior to falling. She is unsure of head trauma. Complains of right knee pain. No fever, chills, chest pain, shortness of breath, nausea, vomiting, or abdominal pain      The history is provided by the patient and the spouse.     Review of patient's allergies indicates:   Allergen Reactions    Lipitor [atorvastatin] Itching     Itching, elevated cpk, muscle aches, statin induced myositis     Past Medical History:   Diagnosis Date    Acute respiratory failure with hypoxia 4/30/2019    FUENTES (acute kidney injury) 5/1/2019    Allergy     Anemia     Aortic aneurysm     Breast cancer 10/2011    left breast Stage 0 DCIS    Cataract     Chronic diastolic congestive heart failure 11/6/2015    Colon polyp     Community acquired pneumonia of right lower lobe of lung 8/3/2021    GERD (gastroesophageal reflux disease)     Hiatal hernia     History of colonic polyps     Hx of psychiatric care     Zoloft    HX: breast cancer     Hyperlipemia     Hypertension     ICH (intracerebral hemorrhage)     Immunocompromised 10/28/2022    Major depressive disorder, single episode, mild 6/23/2016    Nuclear sclerosis 7/21/2014    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 7/21/2014    PAD (peripheral artery disease) 11/6/2015    Psychiatric problem     Statin-induced rhabdomyolysis     4/2015     Stroke 4/2011    Type 2 diabetes mellitus with diabetic peripheral angiopathy without gangrene, with long-term current use of insulin 3/31/2021    Type 2 diabetes mellitus without complication, without long-term current use of insulin  2/10/2020    Walker as ambulation aid      Past Surgical History:   Procedure Laterality Date    APPENDECTOMY      BONE MARROW BIOPSY Left 10/3/2019    Procedure: Biopsy-bone marrow;  Surgeon: Jere Tineo MD;  Location: [address];  Service: Oncology;  Laterality: Left;    BREAST BIOPSY Left 10/2011    BREAST LUMPECTOMY Left 2011    DCIS    COLONOSCOPY      COLONOSCOPY N/A 1/9/2020    Procedure: COLONOSCOPY;  Surgeon: Quang De La Cruz MD;  Location: [facility]  Service: Endoscopy;  Laterality: N/A;    CYST REMOVAL      back    ESOPHAGOGASTRODUODENOSCOPY  07/2016    duodenal angioectasia    ESOPHAGOGASTRODUODENOSCOPY N/A 1/9/2020    Procedure: EGD (ESOPHAGOGASTRODUODENOSCOPY);  Surgeon: Quang De La Cruz MD;  Location: [facility] ([facility]);  Service: Endoscopy;  Laterality: N/A;    FOOT FRACTURE SURGERY  10/2012    right foot, with R hallux valgus repair    FRACTURE SURGERY Right     broken toe repiar    HEMORRHOID SURGERY      LIVER BIOPSY  04/2015    essentially normal, no fibrosis    TUBAL LIGATION      UPPER GASTROINTESTINAL ENDOSCOPY       Family History   Problem Relation Name Age of Onset    Dementia Sister x6         x1 sister    Cancer Sister 2 63        Lung Cancer    No Known Problems Mother      Cancer Father      No Known Problems Brother      Hypertension Daughter Angélica     Fibroids Daughter Angélica         uterine    Hypertension Son Guillermo     No Known Problems Brother      No Known Problems Maternal Aunt      No Known Problems Maternal Uncle      No Known Problems Paternal Aunt      No Known Problems Paternal Uncle      Hypertension Maternal Grandmother      No Known Problems Maternal Grandfather      No Known Problems Paternal Grandmother      No Known Problems Paternal Grandfather      Ovarian cancer Neg Hx      Colon cancer Neg Hx      Tremor Neg Hx      Amblyopia Neg Hx      Blindness Neg Hx      Cataracts Neg Hx      Diabetes Neg Hx      Glaucoma Neg Hx      Macular  degeneration Neg Hx      Retinal detachment Neg Hx      Strabismus Neg Hx      Stroke Neg Hx      Thyroid disease Neg Hx      Esophageal cancer Neg Hx      Rectal cancer Neg Hx      Stomach cancer Neg Hx      Ulcerative colitis Neg Hx      Crohn's disease Neg Hx      Irritable bowel syndrome Neg Hx      Celiac disease Neg Hx       Social History     Tobacco Use    Smoking status: Former     Current packs/day: 0.00     Average packs/day: 0.3 packs/day for 44.2 years (11.1 ttl pk-yrs)     Types: Cigarettes     Start date:      Quit date: 2011     Years since quittin.0    Smokeless tobacco: Never   Substance Use Topics    Alcohol use: Not Currently     Comment: none since 2019, very rare wine     Drug use: No     Review of Systems    Physical Exam     Initial Vitals [24 0039]   BP Pulse Resp Temp SpO2   118/69 (!) 49 20 97.8 °F (36.6 °C) 98 %      MAP       --         Physical Exam    Nursing note and vitals reviewed.  Constitutional: She appears well-developed and well-nourished. No distress.   HENT:   Head: Normocephalic and atraumatic.   Eyes: EOM are normal. Pupils are equal, round, and reactive to light.   Neck: Neck supple. No tracheal deviation present.   Normal range of motion.  Cardiovascular:  Regular rhythm and intact distal pulses.   Bradycardia present.         No murmur heard.  Pulmonary/Chest: Breath sounds normal. No respiratory distress. She has no wheezes. She has no rales.   Abdominal: Abdomen is soft. She exhibits no distension. There is no abdominal tenderness.   Musculoskeletal:         General: No edema. Normal range of motion.      Cervical back: Normal range of motion and neck supple.      Comments: No C, T, or L spine tenderness to palpation. FROM to upper and lower extremities bilaterally without tenderness to palpation or deformity. Right knee without tenderness to palpation and with FROM. No joint laxity      Neurological: She is alert and oriented to person, place,  and time. She has normal strength. No cranial nerve deficit or sensory deficit.   Skin: Skin is warm and dry. Capillary refill takes less than 2 seconds.         ED Course   Procedures  Labs Reviewed   CBC W/ AUTO DIFFERENTIAL - Abnormal; Notable for the following components:       Result Value    WBC 2.34 (*)     RBC 3.45 (*)     Hemoglobin 9.3 (*)     Hematocrit 30.5 (*)     MCHC 30.5 (*)     RDW 16.0 (*)     Platelets 100 (*)     Gran # (ANC) 1.0 (*)     Lymph # 0.6 (*)     Mono % 20.5 (*)     Eosinophil % 8.1 (*)     All other components within normal limits   COMPREHENSIVE METABOLIC PANEL - Abnormal; Notable for the following components:    Alkaline Phosphatase 40 (*)     ALT 7 (*)     Anion Gap 5 (*)     All other components within normal limits   B-TYPE NATRIURETIC PEPTIDE - Abnormal; Notable for the following components:     (*)     All other components within normal limits   URINALYSIS, REFLEX TO URINE CULTURE - Abnormal; Notable for the following components:    Appearance, UA Hazy (*)     All other components within normal limits    Narrative:     Specimen Source->Urine   ISTAT PROCEDURE - Abnormal; Notable for the following components:    POC Glucose 250 (*)     POC Creatinine 2.0 (*)     POC Hematocrit 26 (*)     All other components within normal limits   POCT GLUCOSE - Abnormal; Notable for the following components:    POCT Glucose 62 (*)     All other components within normal limits   HIV 1 / 2 ANTIBODY    Narrative:     Release to patient->Immediate   HEPATITIS C ANTIBODY    Narrative:     Release to patient->Immediate   TROPONIN I   CK   CK    Narrative:     ADD ON CPK PER DR ENA CAICEDO/ORDER# [id number] @ 2:24AM   POCT GLUCOSE MONITORING CONTINUOUS        ECG Results              EKG 12-lead (Final result)        Collection Time Result Time QRS Duration OHS QTC Calculation    05/02/24 01:26:25 05/02/24 07:01:04 82 386                     Final result by Interface, Lab In [facility] (05/02/24  07:01:15)                   Narrative:    Test Reason : W19.XXXA,    Vent. Rate : 044 BPM     Atrial Rate : 044 BPM     P-R Int : 168 ms          QRS Dur : 082 ms      QT Int : 452 ms       P-R-T Axes : 038 036 086 degrees     QTc Int : 386 ms    Marked sinus bradycardia  Abnormal ECG  When compared with ECG of 10-FEB-2023 10:53,  T wave amplitude has decreased in Anterior leads  Confirmed by Storm PRASAD MD ([id number]) on 5/2/2024 7:01:02 AM    Referred By: [email or web address]   SELF           Confirmed By:Storm PRASAD MD                                  Imaging Results              CT Cervical Spine Without Contrast (Final result)  Result time 05/02/24 06:28:54      Final result by Roger Arboleda MD (05/02/24 06:28:54)                   Impression:      Head CT and Cervical Spine CT:    No acute intracranial pathology, specifically no calvarial fracture or intracranial hemorrhage.    Chronic microvascular ischemic change.    No acute fracture or traumatic malalignment of the cervical spine.  Multilevel degenerative changes of the cervical spine, with greatest involvement in the facet joints.    Incidental bilateral cervical ribs.    Probable anemia.    Electronically signed by resident: Duarte Pena  Date:    05/02/2024  Time:    04:20    Electronically signed by: Roger Arboleda  Date:    05/02/2024  Time:    06:28               Narrative:    EXAMINATION:  CT HEAD WITHOUT CONTRAST; CT CERVICAL SPINE WITHOUT CONTRAST    CLINICAL HISTORY:  fall;    TECHNIQUE:  Low dose axial CT images obtained throughout the head without the use of intravenous contrast.  Axial, sagittal and coronal reconstructions were performed.    Low dose axial images, sagittal and coronal reformations were performed though the cervical spine.  Contrast was not administered.    COMPARISON:  CT head 12/13/2023.    FINDINGS:  CT head:    Periventricular white matter hypoattenuation likely reflecting chronic microvascular ischemic change.  Remote lacunar type  infarcts in the right basal ganglia and left thalamus.  No intracranial hemorrhage.  No mass effect. No evidence of acute infarction.    No extra-axial blood or fluid collections identified.    Ventricles are unchanged in size and configuration.  No overt hydrocephalus.    No calvarial fracture or focal osseous lesion. Mastoid air cells and paranasal sinuses are well aerated. Extracranial soft tissues are unremarkable.    CT cervical spine:    Spinal alignment: Straightening of the normal cervical lordosis.    Vertebrae: Anterior and posterior arches of C1 are normal.  Odontoid process is intact.  Vertebral body heights are maintained. No acute displaced fracture.  No osseous destructive lesion.    Discs: Disc space heights are maintained.    Paraspinal soft tissues: Prevertebral soft tissues are normal. Lung apices are clear.  No apical pneumothorax.    Degenerative changes: Multilevel disc, uncovertebral, and facet joint degeneration.  There is moderate neural foraminal stenosis on the right at C3-C4 and on the left at C4-C5.  No high-grade spinal canal stenosis.    Incidental bilateral cervical ribs.    Scattered aortic and carotid atherosclerotic calcifications.  Arterial luminal contents appear hypoattenuating relative to the noncalcified portions of the arterial walls.                                       CT Head Without Contrast (Final result)  Result time 05/02/24 06:28:54      Final result by Roger Arboleda MD (05/02/24 06:28:54)                   Impression:      Head CT and Cervical Spine CT:    No acute intracranial pathology, specifically no calvarial fracture or intracranial hemorrhage.    Chronic microvascular ischemic change.    No acute fracture or traumatic malalignment of the cervical spine.  Multilevel degenerative changes of the cervical spine, with greatest involvement in the facet joints.    Incidental bilateral cervical ribs.    Probable anemia.    Electronically signed by resident: Duarte  Jean  Date:    05/02/2024  Time:    04:20    Electronically signed by: Roger Arboleda  Date:    05/02/2024  Time:    06:28               Narrative:    EXAMINATION:  CT HEAD WITHOUT CONTRAST; CT CERVICAL SPINE WITHOUT CONTRAST    CLINICAL HISTORY:  fall;    TECHNIQUE:  Low dose axial CT images obtained throughout the head without the use of intravenous contrast.  Axial, sagittal and coronal reconstructions were performed.    Low dose axial images, sagittal and coronal reformations were performed though the cervical spine.  Contrast was not administered.    COMPARISON:  CT head 12/13/2023.    FINDINGS:  CT head:    Periventricular white matter hypoattenuation likely reflecting chronic microvascular ischemic change.  Remote lacunar type infarcts in the right basal ganglia and left thalamus.  No intracranial hemorrhage.  No mass effect. No evidence of acute infarction.    No extra-axial blood or fluid collections identified.    Ventricles are unchanged in size and configuration.  No overt hydrocephalus.    No calvarial fracture or focal osseous lesion. Mastoid air cells and paranasal sinuses are well aerated. Extracranial soft tissues are unremarkable.    CT cervical spine:    Spinal alignment: Straightening of the normal cervical lordosis.    Vertebrae: Anterior and posterior arches of C1 are normal.  Odontoid process is intact.  Vertebral body heights are maintained. No acute displaced fracture.  No osseous destructive lesion.    Discs: Disc space heights are maintained.    Paraspinal soft tissues: Prevertebral soft tissues are normal. Lung apices are clear.  No apical pneumothorax.    Degenerative changes: Multilevel disc, uncovertebral, and facet joint degeneration.  There is moderate neural foraminal stenosis on the right at C3-C4 and on the left at C4-C5.  No high-grade spinal canal stenosis.    Incidental bilateral cervical ribs.    Scattered aortic and carotid atherosclerotic calcifications.  Arterial luminal  contents appear hypoattenuating relative to the noncalcified portions of the arterial walls.                                       X-Ray Chest AP Portable (Final result)  Result time 05/02/24 06:14:58      Final result by Lester Petersen MD (05/02/24 06:14:58)                   Impression:      Mild cardiomegaly.  Allowing for differences in projection, no significant detrimental interval change in the appearance of the chest since 12/17/2021 is observed.      Electronically signed by: Lester Petersen MD  Date:    05/02/2024  Time:    06:14               Narrative:    EXAMINATION:  XR CHEST AP PORTABLE    TECHNIQUE:  One view was obtained.  Note is made of the fact that both hemithoraces are obscured to some degree by opacities relating to structures external to the thorax.    COMPARISON:  Comparison is made to 12/17/2021.    FINDINGS:  Heart appears mildly enlarged, but the appearance of the cardiomediastinal silhouette demonstrates no detrimental change since the examination referenced above.  Pulmonary vascularity is normal.  Opacities relating to overlying soft tissue are noted over both hemithoraces, but the lung zones appear clear and are free of significant airspace consolidation or volume loss.  No pleural fluid.  No pneumothorax.                                       X-Ray Knee 3 View Right (Final result)  Result time 05/02/24 07:03:18      Final result by Lester Petersen MD (05/02/24 07:03:18)                   Impression:      As above      Electronically signed by: Lester Petersen MD  Date:    05/02/2024  Time:    07:03               Narrative:    EXAMINATION:  XR KNEE 3 VIEW RIGHT    TECHNIQUE:  Three views of the right knee were obtained, with AP, lateral, and patellar projections submitted.    COMPARISON:  No relevant comparison examinations are currently available.  Clinical information of trauma.    FINDINGS:  Visualized osseous structures appear intact, with no definite evidence of recent fracture.  No osseous  destructive process.  Some degenerative arthritic changes are noted, including medial compartment tibiofemoral joint space narrowing, spurring involving the tibial spines, and patellar spurring.  No significant joint effusion.  Incidental note is made of some femoral arterial calcification in the soft tissues of the right thigh.                                       Medications   sodium chloride 0.9% flush 10 mL (has no administration in time range)   naloxone 0.4 mg/mL injection 0.02 mg (has no administration in time range)   glucose chewable tablet 16 g (has no administration in time range)   glucose chewable tablet 24 g (has no administration in time range)   glucagon (human recombinant) injection 1 mg (has no administration in time range)   dextrose 10% bolus 125 mL 125 mL (has no administration in time range)   dextrose 10% bolus 250 mL 250 mL (has no administration in time range)   acyclovir capsule 400 mg (400 mg Oral Given 5/2/24 0828)   amLODIPine tablet 5 mg (5 mg Oral Given 5/2/24 0828)   donepeziL tablet 5 mg (has no administration in time range)   sertraline tablet 50 mg (50 mg Oral Given 5/2/24 0828)   valsartan tablet 160 mg (160 mg Oral Given 5/2/24 0828)   aspirin chewable tablet 81 mg (81 mg Oral Given 5/2/24 0943)   naproxen tablet 500 mg (500 mg Oral Given 5/2/24 0644)     Medical Decision Making  Pt presents for unwitnessed fall. Ddx: ACS, arrhythmia, electrolyte abnormality, UTI, pneumonia, SDH, EDH, ICH, spine fx, spine dislocation, knee fracture. Pt well appearing and in no acute distress. Hemodynamically stable. EKG with sinus bradycardia without evidence of heart block. No ST elevation. Trop negative. Labs without leukocytosis, anemia, or electrolyte abnormality. CXR without acute process. UA negative. CT head and neck negative. Knee x-ray without acute process. Pt persistently bradycardic in the ED in the low to mid 40s. Concern fall may be related to acute bradycardia. Discussed with  heme/onc who plan for admission for further management and evaluation     Amount and/or Complexity of Data Reviewed  External Data Reviewed: radiology.     Details: === Results for orders placed during the hospital encounter of 12/05/21 ===    Echo    - Interpretation Summary -  · The left ventricle is normal in size with normal systolic function.  · The estimated ejection fraction is 58%.  · There are segmental left ventricular wall motion abnormalities.  · There is abnormal septal wall motion.  · Grade I left ventricular diastolic dysfunction.  · Mild left atrial enlargement.  · Normal right ventricular size with normal right ventricular systolic function.  · There is mild aortic valve stenosis.  · Aortic valve area is 1.87 cm2; peak velocity is 2.77 m/s; mean gradient is 16 mmHg.  · Mild mitral regurgitation.  · Mild to moderate tricuspid regurgitation.  · Trivial posterior pericardial effusion.      Labs: ordered.  Radiology: ordered.  ECG/medicine tests: ordered and independent interpretation performed. Decision-making details documented in ED Course.    Risk  Prescription drug management.  Decision regarding hospitalization.  Diagnosis or treatment significantly limited by social determinants of health.              Attending Attestation:   Physician Attestation Statement for Resident:  As the supervising MD   Physician Attestation Statement: I have personally seen and examined this patient.   I agree with the above history.  -:   As the supervising MD I agree with the above PE.     As the supervising MD I agree with the above treatment, course, plan, and disposition.   -: See ED course for additional attending MDM                     ED Course as of 05/02/24 1329   Thu May 02, 2024   0133 EKG 12-lead  EKG independently interpreted by me shows sinus bradycardia, rate 44, no STEMI, low-voltage diffusely, when compared to 02/10/2023, bradycardia is chronic though not to the same degree [BD]      ED Course User  Index  [BD] Say Casanova MD                           Clinical Impression:  Final diagnoses:  [W19.XXXA] Fall  [R00.1] Bradycardia (Primary)          ED Disposition Condition    Admit Stable                Gallo Pennington Jr., MD  Resident  05/02/24 [id number]

## 2024-05-02 NOTE — HOSPITAL COURSE
Patient admitted for concern of syncope in setting of bradycardia. On interview, it appears patient most likely tripped and fell, especially in light of patient not using her walker to ambulate. CTH/CT cervical were without acute pathology. EKG sinus bradycardia, biphasic P waves, normal intervals, no ST/T wave abnormalities (similar to prior). Labs unrevealing of organic cause of fall: CPK 35, ABC 2.3, Hgb 9.2, Hct 30.5, Vgv230, Na 130, K 4.5, Cl 106, Bicarb 29, BGL 63, BUN 13, Cr 0.8, , Trop <0.006. an echocardiogram was obtain out of abundance of caution, which did not reveal any new structural heart disease. Per  at bedside, patient was at her mental baseline. She was medically cleared for discharge home with PCP and cardiology follow ups.

## 2024-05-02 NOTE — DISCHARGE SUMMARY
[facility]  Hematology  Bone Marrow Transplant  Discharge Summary      Patient Name: Shelby Thompson  MRN: [id number]  Admission Date: 5/2/2024  Hospital Length of Stay: 0 days  Discharge Date and Time:  05/02/2024 1:28 PM  Attending Physician: Pierce Temple MD   Discharging Provider: Asa Davis MD  Primary Care Provider: Emanuel Arevalo Jr., MD    HPI: 74 F with left breast DCIS s/p lumpectomy and radiation, IgG Kappa MM s/p auto SCT 4 years 2 months ago now on kenia/pomalidonomide salvage therapy, who presents to ED following unwitnessed fall, unknown head trauma. Patient is poor historian 2/2 dementia. Patient reports she fell and hit her right knee, denies LOC or head trauma. Her right knee is most tender on the medial aspect, but there is no duncan signs of injury. Knee xray is without fracture or significant joint effusion. Per , who had been out of the house for 1 hour, patient was found in chair complaining of knee pain. Otherwise she was her normal self. He denies any changes with patient lately (CP, SOB, diarrhea, food intake). 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 EKG with sinus bradycardia, biphasic P waves, normal intervals, no ST/T wave abnormalities (similar to prior). CTH/CT cervical  without acute pathology. CPK 35, WBC 2.3, Hgb 9.2, Hct 30.5, Cqt107, Na 130, K 4.5, Cl 106, Bicarb 29, BGL 63, BUN 13, Cr 0.8, , Trop <0.006       Hospital Course: Patient admitted for concern of syncope in setting of bradycardia. On interview, it appears patient most likely tripped and fell, especially in light of patient not using her walker to ambulate. CTH/CT cervical were without acute pathology. EKG sinus bradycardia, biphasic P waves, normal intervals, no ST/T wave abnormalities (similar to prior). Labs  unrevealing of organic cause of fall: CPK 35, ABC 2.3, Hgb 9.2, Hct 30.5, Vhs232, Na 130, K 4.5, Cl 106, Bicarb 29, BGL 63, BUN 13, Cr 0.8, , Trop <0.006. an echocardiogram was obtain out of abundance of caution, which did not reveal any new structural heart disease. Per  at bedside, patient was at her mental baseline. She was medically cleared for discharge home with PCP and cardiology follow ups.    Goals of Care Treatment Preferences:  Code Status: Full Code      Consults (From admission, onward)          Status Ordering Provider     Inpatient consult to Hematology/Oncology  Once        Provider:  (Not yet assigned)    Completed ENA CAICEDO JR.            Significant Diagnostic Studies:   Results for orders placed during the hospital encounter of 05/02/24    Echo    Interpretation Summary    Left Ventricle: The left ventricle is normal in size. Normal wall thickness. There is normal systolic function. Ejection fraction by visual approximation is 60%. Grade II diastolic dysfunction.    Right Ventricle: Normal right ventricular cavity size. Wall thickness is normal. Systolic function is normal.    Left Atrium: Left atrium is severely dilated.    Tricuspid Valve: There is moderate regurgitation.    Pulmonary Artery: There is mild pulmonary hypertension. The estimated pulmonary artery systolic pressure is 49 mmHg.    IVC/SVC: Intermediate venous pressure at 8 mmHg.      Pending Diagnostic Studies:       None          Final Active Diagnoses:    Diagnosis Date Noted POA    PRINCIPAL PROBLEM:  Fall [W19.XXXA] 05/02/2024 Unknown    Bradycardia [R00.1] 05/02/2024 Unknown    Stage 3a chronic kidney disease [N18.31] 03/31/2024 Yes    Mild vascular dementia [F01.A0] 06/19/2023 Yes    Immunocompromised [D84.9] 10/28/2022 Yes    Chronic obstructive pulmonary disease, unspecified COPD type [J44.9] 11/15/2021 Yes    Type 2 diabetes mellitus with diabetic peripheral angiopathy without gangrene, with long-term  current use of insulin [E11.51, Z79.4] 03/31/2021 Not Applicable    Hypertension associated with diabetes [E11.59, I15.2] 02/10/2020 Yes    Status post autologous bone marrow transplant [Z94.81]  Not Applicable    Metastatic multiple myeloma to bone [C90.00] 05/10/2019 Yes    Multiple myeloma in relapse [C90.02] 05/09/2019 Yes    History of CVA with residual deficit [I69.30] 11/06/2015 Not Applicable      Problems Resolved During this Admission:      Discharged Condition: stable    Disposition:     Follow Up:   Follow-up Information       Emanuel Arevalo Jr., MD Follow up.    Specialty: Internal Medicine  Contact information:  [address]  [place] [address]  [phone]               Ilya Jorge, PhD .    Specialty: Psychology  Contact information:  [address] [facility] [address]  [phone]                           Patient Instructions:      Ambulatory referral/consult to Cardiology   Standing Status: Future   Referral Priority: Routine Referral Type: Consultation   Referral Reason: Specialty Services Required   Requested Specialty: Cardiology   Number of Visits Requested: 1     Medications:  Reconciled Home Medications:      Medication List        CONTINUE taking these medications      acyclovir 400 MG tablet  Commonly known as: ZOVIRAX  TAKE 1 TABLET BY MOUTH TWICE A DAY     amLODIPine 5 MG tablet  Commonly known as: NORVASC  TAKE 1 TABLET BY MOUTH EVERY DAY     aspirin 81 MG EC tablet  Commonly known as: ECOTRIN  TAKE 1 TABLET BY MOUTH EVERY DAY     b complex vitamins tablet  Commonly known as: B COMPLEX-VITAMIN B12  Take 1 tablet by mouth once daily.     BELSOMRA 10 mg Tab  Generic drug: suvorexant  Take 1 tablet by mouth every evening.     cholecalciferol (vitamin D3) 125 mcg (5,000 unit) Tab  1 tablet DAILY (route: oral)     cyanocobalamin (vitamin B-12) 5,000 mcg Subl  Place one under tongue once a day for 1 month, then continue to take under tongue per week     DARZALEX  FASPRO subcutaneous injection  Generic drug: daratumumab-hyaluronidase-fihj     donepeziL 5 MG tablet  Commonly known as: ARICEPT  Take 1 tablet (5 mg total) by mouth every evening.     ENGERIX-B (PF) 20 mcg/mL Syrg  Generic drug: hepatitis B virus vacc.rec(PF)     ferrous gluconate 324 mg (37.5 mg iron) Tab tablet  1 tablet DAILY (route: oral)     gabapentin 300 MG capsule  Commonly known as: NEURONTIN  Take 1 capsule (300 mg total) by mouth 2 (two) times daily.     GADAVIST 1 mmol/mL (604.72 mg/mL) Soln injection  Generic drug: gadobutroL     magnesium oxide 400 mg (241.3 mg magnesium) tablet  Commonly known as: MAG-OX  TAKE 2 TABLETS BY MOUTH 2 TIMES DAILY.     metFORMIN 1000 MG tablet  Commonly known as: GLUCOPHAGE  Take 1 tablet (1,000 mg total) by mouth 2 (two) times daily with meals.     omeprazole 40 MG capsule  Commonly known as: PRILOSEC  TAKE 1 CAPSULE BY MOUTH EVERY DAY     OXYGEN-AIR DELIVERY SYSTEMS MISC  1-2 Liter O2 - CONTINUOUS (route: Oxygen)     pomalidomide 2 mg Cap  TAKE 1 CAPSULE (2MG) BY MOUTH EVERY DAY on days 1-21 of a 28 day cycle.  Auth # [id number] 3/28/24.     * potassium chloride SA 20 MEQ tablet  Commonly known as: K-DUR,KLOR-CON     * potassium chloride SA 20 MEQ tablet  Commonly known as: K-DUR,KLOR-CON  Take 1 tablet (20 mEq total) by mouth once daily.     REPATHA SURECLICK 140 mg/mL Pnij  Generic drug: evolocumab  Inject 1 mL (140 mg total) into the skin every 14 (fourteen) days.     sertraline 50 MG tablet  Commonly known as: ZOLOFT  Take 1 tablet (50 mg total) by mouth once daily.     valsartan 160 MG tablet  Commonly known as: DIOVAN  Take 1 tablet (160 mg total) by mouth once daily.     VENOFER 100 mg iron/5 mL injection  Generic drug: iron sucrose     zoledronic 4 mg/100 mL Pgbk infusion     zoledronic acid 4 mg/5 mL injection  Commonly known as: ZOMETA           * This list has 2 medication(s) that are the same as other medications prescribed for you. Read the directions  carefully, and ask your doctor or other care provider to review them with you.                STOP taking these medications      amoxicillin-clavulanate 875-125mg 875-125 mg per tablet  Commonly known as: AUGMENTIN            ASK your doctor about these medications      albuterol 1.25 mg/3 mL Nebu  Commonly known as: ACCUNEB  Take 3 mLs (1.25 mg total) by nebulization every 6 (six) hours as needed (wheeze/cough). Rescue     loperamide 2 mg capsule  Commonly known as: IMODIUM  TAKE 1 CAPSULE BY MOUTH 4 TIMES DAILY AS NEEDED FOR DIARRHEA.              Asa Davis MD  Bone Marrow Transplant  [facility]

## 2024-05-02 NOTE — SUBJECTIVE & OBJECTIVE
Patient information was obtained from patient, spouse/SO, past medical records, and ER records.     Oncology History:     MM s/p Auto SCT  - high risk MM with 17p deletion  - 4  year 2  months  s/p Auto SCT  - started maintenance q2w velcade 5/6/20   - serial biochemical relapse  Noted and  given this and high risk CG , transitioned to Sravanthi/Noble (1mg/m2) /dex  Salvage July 2021    - Due to uncontrolled hyperglycemia dex stopped after C4.    -supportive meds:   continue ppx acyclovir, asa; resume maintenance zometa q 3 months ;  she is past due to for next dose; will schedule with next sravanthi injection   -she  demonstrated mild biochemical progression over summer 2022 studies with no CRAB, in light of this and high risk CG   we transitioned her therapy from Sravanthi/Velcade to Sravanthi/pomalyst; intolerant to dex.     -Pomalyst dose decreased to  to 2mg (5/19/23) due to persistent neutropenia  now improved   -- biochemical studies from   4/15/24 cw no significant progression or CRAB; myeloma labs linked and drawn today unnecessarily today ; will fu on these     (Not in a hospital admission)      Lipitor [atorvastatin]     Past Medical History:   Diagnosis Date    Acute respiratory failure with hypoxia 4/30/2019    FUENTES (acute kidney injury) 5/1/2019    Allergy     Anemia     Aortic aneurysm     Breast cancer 10/2011    left breast Stage 0 DCIS    Cataract     Chronic diastolic congestive heart failure 11/6/2015    Colon polyp     Community acquired pneumonia of right lower lobe of lung 8/3/2021    GERD (gastroesophageal reflux disease)     Hiatal hernia     History of colonic polyps     Hx of psychiatric care     Zoloft    HX: breast cancer     Hyperlipemia     Hypertension     ICH (intracerebral hemorrhage)     Immunocompromised 10/28/2022    Major depressive disorder, single episode, mild 6/23/2016    Nuclear sclerosis 7/21/2014    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 7/21/2014    PAD  (peripheral artery disease) 11/6/2015    Psychiatric problem     Statin-induced rhabdomyolysis     4/2015     Stroke 4/2011    Type 2 diabetes mellitus with diabetic peripheral angiopathy without gangrene, with long-term current use of insulin 3/31/2021    Type 2 diabetes mellitus without complication, without long-term current use of insulin 2/10/2020    Walker as ambulation aid      Past Surgical History:   Procedure Laterality Date    APPENDECTOMY      BONE MARROW BIOPSY Left 10/3/2019    Procedure: Biopsy-bone marrow;  Surgeon: Jere Tineo MD;  [facility];  Service: Oncology;  Laterality: Left;    BREAST BIOPSY Left 10/2011    BREAST LUMPECTOMY Left 2011    DCIS    COLONOSCOPY      COLONOSCOPY N/A 1/9/2020    Procedure: COLONOSCOPY;  Surgeon: Quang De La Cruz MD;  Location: [facility] (4TH FLR);  Service: Endoscopy;  Laterality: N/A;    CYST REMOVAL      back    ESOPHAGOGASTRODUODENOSCOPY  07/2016    duodenal angioectasia    ESOPHAGOGASTRODUODENOSCOPY N/A 1/9/2020    Procedure: EGD (ESOPHAGOGASTRODUODENOSCOPY);  Surgeon: Quang De La Cruz MD;  Location: [facility] ENDO (4TH FLR);  Service: Endoscopy;  Laterality: N/A;    FOOT FRACTURE SURGERY  10/2012    right foot, with R hallux valgus repair    FRACTURE SURGERY Right     broken toe repiar    HEMORRHOID SURGERY      LIVER BIOPSY  04/2015    essentially normal, no fibrosis    TUBAL LIGATION      UPPER GASTROINTESTINAL ENDOSCOPY       Family History       Problem Relation (Age of Onset)    Cancer Sister (63), Father    Dementia Sister    Fibroids Daughter    Hypertension Daughter, Son, Maternal Grandmother    No Known Problems Mother, Brother, Brother, Maternal Aunt, Maternal Uncle, Paternal Aunt, Paternal Uncle, Maternal Grandfather, Paternal Grandmother, Paternal Grandfather          Tobacco Use    Smoking status: Former     Current packs/day: 0.00     Average packs/day: 0.3 packs/day for 44.2 years (11.1 ttl pk-yrs)     Types: Cigarettes      Start date:      Quit date: 2011     Years since quittin.0    Smokeless tobacco: Never   Substance and Sexual Activity    Alcohol use: Not Currently     Comment: none since 2019, very rare wine     Drug use: No    Sexual activity: Not Currently     Partners: Male     Birth control/protection: Post-menopausal       Review of Systems   Constitutional:  Negative for chills and fever.   HENT:  Negative for congestion and rhinorrhea.    Respiratory:  Positive for shortness of breath (chronic). Negative for cough and wheezing.    Cardiovascular:  Negative for chest pain, palpitations and leg swelling.   Gastrointestinal:  Negative for abdominal distention, abdominal pain, constipation, diarrhea, nausea and vomiting.   Genitourinary:  Negative for dysuria.   Musculoskeletal:  Positive for arthralgias (right knee pain). Negative for joint swelling.   Neurological:  Positive for weakness (right sided, at baseline). Negative for dizziness, seizures, syncope, facial asymmetry, light-headedness, numbness and headaches.   Psychiatric/Behavioral:  Positive for confusion (baseline). Negative for agitation.      Objective:     Vital Signs (Most Recent):  Temp: 97.8 °F (36.6 °C) (24 0745)  Pulse: (!) 39 (24 09)  Resp: (!) 39 (24)  BP: 137/73 (24)  SpO2: 100 % (24) Vital Signs (24h Range):  Temp:  [97.6 °F (36.4 °C)-97.8 °F (36.6 °C)] 97.8 °F (36.6 °C)  Pulse:  [39-53] 39  Resp:  [14-39] 39  SpO2:  [78 %-100 %] 100 %  BP: (101-163)/(61-93) 137/73     Weight: 57.9 kg (127 lb 10.3 oz)  Body mass index is 23.35 kg/m².  Body surface area is 1.59 meters squared.    ECOG SCORE           [unfilled]    Lines/Drains/Airways       Peripheral Intravenous Line  Duration                  Peripheral IV - Single Lumen 24 0132 20 G Left Antecubital <1 day                     Physical Exam  Vitals and nursing note reviewed.   Constitutional:       General: She is not in acute  distress.     Appearance: Normal appearance. She is not ill-appearing.   HENT:      Head: Normocephalic and atraumatic.      Nose: No congestion or rhinorrhea.      Mouth/Throat:      Mouth: Mucous membranes are dry.   Eyes:      General:         Right eye: No discharge.         Left eye: No discharge.      Extraocular Movements: Extraocular movements intact.      Pupils: Pupils are equal, round, and reactive to light.   Cardiovascular:      Rate and Rhythm: Regular rhythm. Bradycardia present.      Heart sounds: No murmur heard.  Pulmonary:      Effort: Pulmonary effort is normal. No respiratory distress.      Breath sounds: Normal breath sounds. No wheezing.   Abdominal:      General: Abdomen is flat. There is no distension.      Palpations: Abdomen is soft.      Tenderness: There is no abdominal tenderness.   Musculoskeletal:         General: Tenderness (right knee, medial aspect) present.      Cervical back: Normal range of motion. No rigidity.      Right lower leg: No edema.      Left lower leg: No edema.   Skin:     Findings: No bruising, lesion or rash.   Neurological:      General: No focal deficit present.      Mental Status: She is alert. She is disoriented.      Comments: At baseline            Significant Labs:   All pertinent labs from the last 24 hours have been reviewed.    Diagnostic Results:  I have reviewed all pertinent imaging results/findings within the past 24 hours.

## 2024-05-02 NOTE — HPI
74 F with left breast DCIS s/p lumpectomy and radiation, IgG Kappa MM s/p auto SCT 4 years 2 months ago now on kenai/pomalidonomide salvage therapy, who presents to ED following unwitnessed fall, unknown head trauma. Patient is poor historian 2/2 dementia. Patient reports she fell and hit her right knee, denies LOC or head trauma. Her right knee is most tender on the medial aspect, but there is no duncan signs of injury. Knee xray is without fracture or significant joint effusion. Per , who had been out of the house for 1 hour, patient was found in chair complaining of knee pain. Otherwise she was her normal self. He denies any changes with patient lately (CP, SOB, diarrhea, food intake). 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 EKG with sinus bradycardia, biphasic P waves, normal intervals, no ST/T wave abnormalities (similar to prior). CTH/CT cervical  without acute pathology. CPK 35, WBC 2.3, Hgb 9.2, Hct 30.5, Dcj055, Na 130, K 4.5, Cl 106, Bicarb 29, BGL 63, BUN 13, Cr 0.8, , Trop <0.006

## 2024-05-02 NOTE — ASSESSMENT & PLAN NOTE
MM s/p Auto SCT  - high risk MM with 17p deletion  - 3  year 2  months  s/p Auto SCT  - started maintenance q2w velcade 5/6/20   - serial biochemical relapse  Noted and  given this and high risk CG , transitioned to Sravanthi/Noble (1mg/m2) /dex  Salvage July 2021    - Due to uncontrolled hyperglycemia dex stopped after C4.    -supportive meds:   continue ppx acyclovir, asa; resume maintenance zometa q 3 months ;  she is past due to for next dose; will schedule with next sravanthi injection   -she  demonstrated mild biochemical progression over summer 2022 studies with no CRAB, in light of this and high risk CG   we transitioned her therapy from Sravanthi/Velcade to Sravanthi/pomalyst; intolerant to dex.     -Pomalyst dose decreased to  to 2mg (5/19/23) due to persistent neutropenia  now improved   -- biochemical studies from   4/15/24 cw no significant progression or CRAB; myeloma labs linked and drawn today unnecessarily today ; will fu on these

## 2024-05-02 NOTE — CONSULTS
Consult Note:    Please see HPI for additional details.      Emanuel Rose D.O.  Hematology/Oncology Fellow, PGY-V

## 2024-05-02 NOTE — ASSESSMENT & PLAN NOTE
Patient presenting following fall, which occurred 3 pm day prior to arrival  She denies LOC or head trauma  Reports she hit her knee, which is what brought her in  Has never fallen before   states she drags her right foot ever since CVA 2011  She is supposed to use walker for ambulation, but does not.  She was noted to be bradycardic in ED (to 40s)   Ton chart review, this appears to be chronic issue   EKG sinus bradycardia, biphasic P waves, normal intervals, no ST/T wave abnormalities (similar to prior)    Mechanism of fall sounds mechanical as opposed to syncopal event    -f/u Echocardiogram  -f/u orthostatic vitals  -cardiology follow up outpatient

## 2024-05-02 NOTE — ASSESSMENT & PLAN NOTE
2 CVA (08', 11')  Residual right sided deficit  Is to use walker at home but does not   states she drags her right leg  Concern for primary mechanism of presenting fall

## 2024-05-02 NOTE — ASSESSMENT & PLAN NOTE
Kidney function at baseline on admission    - Strict I&Os and daily weights   - daily CMP  - Strict I&Os  - Avoid nephrotoxic agents  - Renally adjust medications

## 2024-05-02 NOTE — PLAN OF CARE
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]  Yes    Immunocompromised [D84.9]  Yes    Chronic obstructive pulmonary disease, unspecified COPD type [J44.9]  Yes    Type 2 diabetes mellitus with diabetic peripheral angiopathy without gangrene, with long-term current use of insulin [E11.51, Z79.4]  Not Applicable    Hypertension associated with diabetes [E11.59, I15.2]  Yes    Status post autologous bone marrow transplant [Z94.81]  Not Applicable    Metastatic multiple myeloma to bone [C90.00]  Yes    Multiple myeloma in relapse [C90.02]  Yes    History of CVA with residual deficit [I69.30]  Not Applicable      Resolved Hospital Problems   No resolved problems to display.       Follow Up Appointments:  Future Appointments   Date Time Provider Department Center   5/10/2024 11:00 AM CHAIR 13, [facility]   5/13/2024  3:00 PM NURSE 4, [facility]   6/10/2024  8:20 AM [facility]   6/10/2024  9:20 AM Jere Tineo MD [facility] HC BMT [facility]   6/10/2024 11:00 AM [facility]IR [facility]   8[facility]13/2024 10:00 [facility]   8/19/2024 10:00 AM Emanuel Arevalo Jr., MD [facility]       Allergies:  Review of patient's allergies indicates:   Allergen Reactions    Lipitor [atorvastatin] Itching     Itching, elevated cpk, muscle aches, statin induced myositis       Medications: Review discharge medications with  patient and family and provide education.    Current Facility-Administered Medications   Medication Dose Route Frequency Provider Last Rate Last Admin    acyclovir capsule 400 mg  400 mg Oral BID Asa Davis MD   400 mg at 05/02/24 0828    amLODIPine tablet 5 mg  5 mg Oral Daily Asa Davis MD   5 mg at 05/02/24 0828    aspirin chewable tablet 81 mg  81 mg Oral Daily Asa Davis MD   81 mg at 05/02/24 0943    dextrose 10% bolus 125 mL 125 mL  12.5 g Intravenous PRN Asa Davis MD        dextrose 10% bolus 250 mL 250 mL  25 g Intravenous PRN Asa Davis MD        donepeziL tablet 5 mg  5 mg Oral QHS Asa Davis MD        glucagon (human recombinant) injection 1 mg  1 mg Intramuscular PRN Asa Davis MD        glucose chewable tablet 16 g  16 g Oral PRAsa Gonzalez MD        glucose chewable tablet 24 g  24 g Oral PRN Asa Davis MD        naloxone 0.4 mg/mL injection 0.02 mg  0.02 mg Intravenous PRN Asa Davis MD        sertraline tablet 50 mg  50 mg Oral Daily Asa Davis MD   50 mg at 05/02/24 0828    sodium chloride 0.9% flush 10 mL  10 mL Intravenous Q12H PRAsa Gonzalez MD        valsartan tablet 160 mg  160 mg Oral Daily Asa Davis MD   160 mg at 05/02/24 0828     Current Outpatient Medications   Medication Sig Dispense Refill    acyclovir (ZOVIRAX) 400 MG tablet TAKE 1 TABLET BY MOUTH TWICE A  tablet 3    albuterol (ACCUNEB) 1.25 mg/3 mL Nebu Take 3 mLs (1.25 mg total) by nebulization every 6 (six) hours as needed (wheeze/cough). Rescue (Patient not taking: Reported on 4/30/2024) 75 mL 2    amLODIPine (NORVASC) 5 MG tablet TAKE 1 TABLET BY MOUTH EVERY DAY 90 tablet 2    aspirin (ECOTRIN) 81 MG EC tablet TAKE 1 TABLET BY MOUTH EVERY DAY 90 tablet 3    b complex vitamins (B COMPLEX-VITAMIN B12) tablet Take 1 tablet by mouth once daily. 30 tablet 3    cholecalciferol, vitamin D3, 125 mcg (5,000 unit) Tab 1 tablet DAILY (route: oral)      cyanocobalamin, vitamin B-12, 5,000 mcg  Subl Place one under tongue once a day for 1 month, then continue to take under tongue per week 30 tablet 3    DARZALEX FASPRO 1,800 mg-30,000 unit/15 mL Soln       donepeziL (ARICEPT) 5 MG tablet Take 1 tablet (5 mg total) by mouth every evening. 90 tablet 2    ENGERIX-B, PF, 20 mcg/mL Syrg       evolocumab (REPATHA SURECLICK) 140 mg/mL PnIj Inject 1 mL (140 mg total) into the skin every 14 (fourteen) days. 6 mL 3    ferrous gluconate 324 mg (37.5 mg iron) Tab tablet 1 tablet DAILY (route: oral)      gabapentin (NEURONTIN) 300 MG capsule Take 1 capsule (300 mg total) by mouth 2 (two) times daily. 180 capsule 2    GADAVIST 1 mmol/mL (604.72 mg/mL) Soln injection       loperamide (IMODIUM) 2 mg capsule TAKE 1 CAPSULE BY MOUTH 4 TIMES DAILY AS NEEDED FOR DIARRHEA. (Patient not taking: Reported on 4/30/2024) 60 capsule 0    magnesium oxide (MAG-OX) 400 mg (241.3 mg magnesium) tablet TAKE 2 TABLETS BY MOUTH 2 TIMES DAILY. 360 tablet 1    metFORMIN (GLUCOPHAGE) 1000 MG tablet Take 1 tablet (1,000 mg total) by mouth 2 (two) times daily with meals. 180 tablet 1    omeprazole (PRILOSEC) 40 MG capsule TAKE 1 CAPSULE BY MOUTH EVERY DAY 90 capsule 1    OXYGEN-AIR DELIVERY SYSTEMS MISC 1-2 Liter O2 - CONTINUOUS (route: Oxygen) (Patient not taking: Reported on 4/30/2024)      pomalidomide 2 mg Cap TAKE 1 CAPSULE (2MG) BY MOUTH EVERY DAY on days 1-21 of a 28 day cycle.  [facility] # [id number] 3/28/24. 21 capsule 0    potassium chloride SA (K-DUR,KLOR-CON) 20 MEQ tablet       potassium chloride SA (K-DUR,KLOR-CON) 20 MEQ tablet Take 1 tablet (20 mEq total) by mouth once daily. 30 tablet 11    sertraline (ZOLOFT) 50 MG tablet Take 1 tablet (50 mg total) by mouth once daily. 90 tablet 3    suvorexant (BELSOMRA) 10 mg Tab Take 1 tablet by mouth every evening. 30 tablet 3    valsartan (DIOVAN) 160 MG tablet Take 1 tablet (160 mg total) by mouth once daily. 90 tablet 3    VENOFER 100 mg iron/5 mL injection       zoledronic 4 mg/100 mL PgBk  infusion       zoledronic acid (ZOMETA) 4 mg/5 mL injection        Current Discharge Medication List        CONTINUE these medications which have NOT CHANGED    Details   acyclovir (ZOVIRAX) 400 MG tablet TAKE 1 TABLET BY MOUTH TWICE A DAY  Qty: 180 tablet, Refills: 3    Associated Diagnoses: Multiple myeloma, remission status unspecified      albuterol (ACCUNEB) 1.25 mg/3 mL Nebu Take 3 mLs (1.25 mg total) by nebulization every 6 (six) hours as needed (wheeze/cough). Rescue  Qty: 75 mL, Refills: 2      amLODIPine (NORVASC) 5 MG tablet TAKE 1 TABLET BY MOUTH EVERY DAY  Qty: 90 tablet, Refills: 2    Comments: .      aspirin (ECOTRIN) 81 MG EC tablet TAKE 1 TABLET BY MOUTH EVERY DAY  Qty: 90 tablet, Refills: 3    Associated Diagnoses: Descending aortic aneurysm      b complex vitamins (B COMPLEX-VITAMIN B12) tablet Take 1 tablet by mouth once daily.  Qty: 30 tablet, Refills: 3    Associated Diagnoses: Thiamine deficiency; B12 deficiency      cholecalciferol, vitamin D3, 125 mcg (5,000 unit) Tab 1 tablet DAILY (route: oral)      cyanocobalamin, vitamin B-12, 5,000 mcg Subl Place one under tongue once a day for 1 month, then continue to take under tongue per week  Qty: 30 tablet, Refills: 3    Associated Diagnoses: B12 deficiency      DARZALEX FASPRO 1,800 mg-30,000 unit/15 mL Soln       donepeziL (ARICEPT) 5 MG tablet Take 1 tablet (5 mg total) by mouth every evening.  Qty: 90 tablet, Refills: 2    Associated Diagnoses: Moderate dementia, unspecified dementia type, unspecified whether behavioral, psychotic, or mood disturbance or anxiety      ENGERIX-B, PF, 20 mcg/mL Syrg       evolocumab (REPATHA SURECLICK) 140 mg/mL PnIj Inject 1 mL (140 mg total) into the skin every 14 (fourteen) days.  Qty: 6 mL, Refills: 3    Associated Diagnoses: Statin intolerance; PAD (peripheral artery disease)      ferrous gluconate 324 mg (37.5 mg iron) Tab tablet 1 tablet DAILY (route: oral)      gabapentin (NEURONTIN) 300 MG capsule Take 1  capsule (300 mg total) by mouth 2 (two) times daily.  Qty: 180 capsule, Refills: 2    Associated Diagnoses: Multiple myeloma in remission      GADAVIST 1 mmol/mL (604.72 mg/mL) Soln injection       loperamide (IMODIUM) 2 mg capsule TAKE 1 CAPSULE BY MOUTH 4 TIMES DAILY AS NEEDED FOR DIARRHEA.  Qty: 60 capsule, Refills: 0    Comments: DX Code Needed  .  Associated Diagnoses: Chemotherapy induced diarrhea      magnesium oxide (MAG-OX) 400 mg (241.3 mg magnesium) tablet TAKE 2 TABLETS BY MOUTH 2 TIMES DAILY.  Qty: 360 tablet, Refills: 1    Associated Diagnoses: Electrolyte abnormality      metFORMIN (GLUCOPHAGE) 1000 MG tablet Take 1 tablet (1,000 mg total) by mouth 2 (two) times daily with meals.  Qty: 180 tablet, Refills: 1      omeprazole (PRILOSEC) 40 MG capsule TAKE 1 CAPSULE BY MOUTH EVERY DAY  Qty: 90 capsule, Refills: 1      OXYGEN-AIR DELIVERY SYSTEMS MISC 1-2 Liter O2 - CONTINUOUS (route: Oxygen)      pomalidomide 2 mg Cap TAKE 1 CAPSULE (2MG) BY MOUTH EVERY DAY on days 1-21 of a 28 day cycle.  [facility] # [id number] 3/28/24.  Qty: 21 capsule, Refills: 0    Associated Diagnoses: Multiple myeloma, remission status unspecified; Drug-induced cytopenia      !! potassium chloride SA (K-DUR,KLOR-CON) 20 MEQ tablet       !! potassium chloride SA (K-DUR,KLOR-CON) 20 MEQ tablet Take 1 tablet (20 mEq total) by mouth once daily.  Qty: 30 tablet, Refills: 11    Associated Diagnoses: Electrolyte abnormality      sertraline (ZOLOFT) 50 MG tablet Take 1 tablet (50 mg total) by mouth once daily.  Qty: 90 tablet, Refills: 3    Associated Diagnoses: Major depressive disorder, recurrent, in full remission      suvorexant (BELSOMRA) 10 mg Tab Take 1 tablet by mouth every evening.  Qty: 30 tablet, Refills: 3    Associated Diagnoses: Mixed dementia; Mild vascular dementia with other behavioral disturbance; Insomnia, unspecified type      valsartan (DIOVAN) 160 MG tablet Take 1 tablet (160 mg total) by mouth once daily.  Qty: 90 tablet,  Refills: 3    Comments: .  Associated Diagnoses: Essential hypertension      VENOFER 100 mg iron/5 mL injection       zoledronic 4 mg/100 mL PgBk infusion       zoledronic acid (ZOMETA) 4 mg/5 mL injection        !! - Potential duplicate medications found. Please discuss with provider.        STOP taking these medications       amoxicillin-clavulanate 875-125mg (AUGMENTIN) 875-125 mg per tablet Comments:   Reason for Stopping:                 I have seen and examined this patient within the last 30 days. My clinical findings that support the need for the home health skilled services and home bound status are the following:no   Weakness/numbness causing balance and gait disturbance due to Stroke and Malignancy/Cancer making it taxing to leave home.     Diet:   regular diet    Referrals/ Consults  Physical Therapy to evaluate and treat. Evaluate for home safety and equipment needs; Establish/upgrade home exercise program. Perform / instruct on therapeutic exercises, gait training, transfer training, and Range of Motion.  Occupational Therapy to evaluate and treat. Evaluate home environment for safety and equipment needs. Perform/Instruct on transfers, ADL training, ROM, and therapeutic exercises.  Aide to provide assistance with personal care, ADLs, and vital signs.    Activities:   activity as tolerated    Nursing:   Agency to admit patient within 24 hours of hospital discharge unless specified on physician order or at patient request    SN to complete comprehensive assessment including routine vital signs. Instruct on disease process and s/s of complications to report to MD. Review/verify medication list sent home with the patient at time of discharge  and instruct patient/caregiver as needed. Frequency may be adjusted depending on start of care date.     Skilled nurse to perform up to 3 visits PRN for symptoms related to diagnosis    Notify MD if SBP > 160 or < 90; DBP > 90 or < 50; HR > 120 or < 50; Temp > 101; O2  < 88%    Ok to schedule additional visits based on staff availability and patient request on consecutive days within the home health episode.    When multiple disciplines ordered:    Start of Care occurs on Sunday - Wednesday schedule remaining discipline evaluations as ordered on separate consecutive days following the start of care.    Thursday SOC -schedule subsequent evaluations Friday and Monday the following week.     Friday - Saturday SOC - schedule subsequent discipline evaluations on consecutive days starting Monday of the following week.    For all post-discharge communication and subsequent orders please contact patient's primary care physician. If unable to reach primary care physician or do not receive response within 30 minutes, please contact for clinical staff order clarification    Miscellaneous   Home Oxygen:  Oxygen at 2 L/min nasal canula to be used:  Continuously.    Home Health Aide:  Physical Therapy Three times weekly, Occupational Therapy Three times weekly, and Home Health Aide Three times weekly    Wound Care Orders      I certify that this patient is confined to her home and needs intermittent skilled nursing care, physical therapy, and occupational therapy.

## 2024-05-02 NOTE — H&P
[facility]  Hematology  Bone Marrow Transplant  H&P      Transplant Diagnosis: Multiple Myeloma   Transplant Type: Autologous      Date of Transplant: 4 years 2 months    Subjective:     Principal Problem: Fall    HPI: 74 F with left breast DCIS s/p lumpectomy and radiation, IgG Kappa MM s/p auto SCT 4 years 2 months ago now on sravanthi/pomalidonomide salvage therapy, who presents to ED following unwitnessed fall, unknown head trauma. Patient is poor historian 2/2 dementia. Patient reports she fell and hit her right knee, denies LOC or head trauma. Her right knee is most tender on the medial aspect, but there is no duncan signs of injury. Knee xray is without fracture or significant joint effusion. Per , who had been out of the house for 1 hour, patient was found in chair complaining of knee pain. Otherwise she was her normal self. He denies any changes with patient lately (CP, SOB, diarrhea, food intake). 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 EKG with sinus bradycardia, biphasic P waves, normal intervals, no ST/T wave abnormalities (similar to prior). CTH/CT cervical  without acute pathology. CPK 35, WBC 2.3, Hgb 9.2, Hct 30.5, Lhk025, Na 130, K 4.5, Cl 106, Bicarb 29, BGL 63, BUN 13, Cr 0.8, , Trop <0.006     Patient information was obtained from patient, spouse/SO, past medical records, and ER records.     Oncology History:     MM s/p Auto SCT  - high risk MM with 17p deletion  - 4  year 2  months  s/p Auto SCT  - started maintenance q2w velcade 5/6/20   - serial biochemical relapse  Noted and  given this and high risk CG , transitioned to Sravanthi/Noble (1mg/m2) /dex  Salvage July 2021    - Due to uncontrolled hyperglycemia dex stopped after C4.    -supportive meds:   continue ppx acyclovir, asa; resume maintenance  zometa q 3 months ;  she is past due to for next dose; will schedule with next sravanthi injection   -she  demonstrated mild biochemical progression over summer 2022 studies with no CRAB, in light of this and high risk CG   we transitioned her therapy from Sravanthi/Velcade to Sravanthi/pomalyst; intolerant to dex.     -Pomalyst dose decreased to  to 2mg (5/19/23) due to persistent neutropenia  now improved   -- biochemical studies from   4/15/24 cw no significant progression or CRAB; myeloma labs linked and drawn today unnecessarily today ; will fu on these     (Not in a hospital admission)      Lipitor [atorvastatin]     Past Medical History:   Diagnosis Date    Acute respiratory failure with hypoxia 4/30/2019    FUENTES (acute kidney injury) 5/1/2019    Allergy     Anemia     Aortic aneurysm     Breast cancer 10/2011    left breast Stage 0 DCIS    Cataract     Chronic diastolic congestive heart failure 11/6/2015    Colon polyp     Community acquired pneumonia of right lower lobe of lung 8/3/2021    GERD (gastroesophageal reflux disease)     Hiatal hernia     History of colonic polyps     Hx of psychiatric care     Zoloft    HX: breast cancer     Hyperlipemia     Hypertension     ICH (intracerebral hemorrhage)     Immunocompromised 10/28/2022    Major depressive disorder, single episode, mild 6/23/2016    Nuclear sclerosis 7/21/2014    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 7/21/2014    PAD (peripheral artery disease) 11/6/2015    Psychiatric problem     Statin-induced rhabdomyolysis     4/2015     Stroke 4/2011    Type 2 diabetes mellitus with diabetic peripheral angiopathy without gangrene, with long-term current use of insulin 3/31/2021    Type 2 diabetes mellitus without complication, without long-term current use of insulin 2/10/2020    Walker as ambulation aid      Past Surgical History:   Procedure Laterality Date    APPENDECTOMY      BONE MARROW BIOPSY Left 10/3/2019    Procedure: Biopsy-bone  marrow;  Surgeon: Jere Tineo MD;  Location: [facility] OR 2ND FLR;  Service: Oncology;  Laterality: Left;    BREAST BIOPSY Left 10/2011    BREAST LUMPECTOMY Left     DCIS    COLONOSCOPY      COLONOSCOPY N/A 2020    Procedure: COLONOSCOPY;  Surgeon: Quang De La Cruz MD;  Location: [facility] ENDO (4TH FLR);  Service: Endoscopy;  Laterality: N/A;    CYST REMOVAL      back    ESOPHAGOGASTRODUODENOSCOPY  2016    duodenal angioectasia    ESOPHAGOGASTRODUODENOSCOPY N/A 2020    Procedure: EGD (ESOPHAGOGASTRODUODENOSCOPY);  Surgeon: Quang De La Cruz MD;  Location: [facility] ENDO (4TH FLR);  Service: Endoscopy;  Laterality: N/A;    FOOT FRACTURE SURGERY  10/2012    right foot, with R hallux valgus repair    FRACTURE SURGERY Right     broken toe repiar    HEMORRHOID SURGERY      LIVER BIOPSY  2015    essentially normal, no fibrosis    TUBAL LIGATION      UPPER GASTROINTESTINAL ENDOSCOPY       Family History       Problem Relation (Age of Onset)    Cancer Sister (63), Father    Dementia Sister    Fibroids Daughter    Hypertension Daughter, Son, Maternal Grandmother    No Known Problems Mother, Brother, Brother, Maternal Aunt, Maternal Uncle, Paternal Aunt, Paternal Uncle, Maternal Grandfather, Paternal Grandmother, Paternal Grandfather          Tobacco Use    Smoking status: Former     Current packs/day: 0.00     Average packs/day: 0.3 packs/day for 44.2 years (11.1 ttl pk-yrs)     Types: Cigarettes     Start date:      Quit date: 2011     Years since quittin.0    Smokeless tobacco: Never   Substance and Sexual Activity    Alcohol use: Not Currently     Comment: none since 2019, very rare wine     Drug use: No    Sexual activity: Not Currently     Partners: Male     Birth control/protection: Post-menopausal       Review of Systems   Constitutional:  Negative for chills and fever.   HENT:  Negative for congestion and rhinorrhea.    Respiratory:  Positive for shortness of breath (chronic). Negative  for cough and wheezing.    Cardiovascular:  Negative for chest pain, palpitations and leg swelling.   Gastrointestinal:  Negative for abdominal distention, abdominal pain, constipation, diarrhea, nausea and vomiting.   Genitourinary:  Negative for dysuria.   Musculoskeletal:  Positive for arthralgias (right knee pain). Negative for joint swelling.   Neurological:  Positive for weakness (right sided, at baseline). Negative for dizziness, seizures, syncope, facial asymmetry, light-headedness, numbness and headaches.   Psychiatric/Behavioral:  Positive for confusion (baseline). Negative for agitation.      Objective:     Vital Signs (Most Recent):  Temp: 97.8 °F (36.6 °C) (05/02/24 0745)  Pulse: (!) 39 (05/02/24 0906)  Resp: (!) 39 (05/02/24 0906)  BP: 137/73 (05/02/24 0906)  SpO2: 100 % (05/02/24 0906) Vital Signs (24h Range):  Temp:  [97.6 °F (36.4 °C)-97.8 °F (36.6 °C)] 97.8 °F (36.6 °C)  Pulse:  [39-53] 39  Resp:  [14-39] 39  SpO2:  [78 %-100 %] 100 %  BP: (101-163)/(61-93) 137/73     Weight: 57.9 kg (127 lb 10.3 oz)  Body mass index is 23.35 kg/m².  Body surface area is 1.59 meters squared.    ECOG SCORE           [unfilled]    Lines/Drains/Airways       Peripheral Intravenous Line  Duration                  Peripheral IV - Single Lumen 05/02/24 0132 20 G Left Antecubital <1 day                     Physical Exam  Vitals and nursing note reviewed.   Constitutional:       General: She is not in acute distress.     Appearance: Normal appearance. She is not ill-appearing.   HENT:      Head: Normocephalic and atraumatic.      Nose: No congestion or rhinorrhea.      Mouth/Throat:      Mouth: Mucous membranes are dry.   Eyes:      General:         Right eye: No discharge.         Left eye: No discharge.      Extraocular Movements: Extraocular movements intact.      Pupils: Pupils are equal, round, and reactive to light.   Cardiovascular:      Rate and Rhythm: Regular rhythm. Bradycardia present.      Heart sounds: No  murmur heard.  Pulmonary:      Effort: Pulmonary effort is normal. No respiratory distress.      Breath sounds: Normal breath sounds. No wheezing.   Abdominal:      General: Abdomen is flat. There is no distension.      Palpations: Abdomen is soft.      Tenderness: There is no abdominal tenderness.   Musculoskeletal:         General: Tenderness (right knee, medial aspect) present.      Cervical back: Normal range of motion. No rigidity.      Right lower leg: No edema.      Left lower leg: No edema.   Skin:     Findings: No bruising, lesion or rash.   Neurological:      General: No focal deficit present.      Mental Status: She is alert. She is disoriented.      Comments: At baseline            Significant Labs:   All pertinent labs from the last 24 hours have been reviewed.    Diagnostic Results:  I have reviewed all pertinent imaging results/findings within the past 24 hours.  Assessment/Plan:     Neuro  Mild vascular dementia  Patient with significant level of dementia  AAOx2 (person, place)   at bedside, states this is her baseline    -continue donepezil    History of CVA with residual deficit  2 CVA (08', 11')  Residual right sided deficit  Is to use walker at home but does not   states she drags her right leg  Concern for primary mechanism of presenting fall    Pulmonary  Chronic obstructive pulmonary disease, unspecified COPD type  Chronic, stable   reports she is to be using supplemental O2 at home, which she is not  Noted to be hypoxic to low 80s in ED without nasal canual on  2L NC added during interview, with appropriate rebound in saturation  Lungs clear to auscultaion    -continue supplemental O2 goal >88%  -encourage continued use at home  -not on any chronic inhalers    Cardiac/Vascular  Bradycardia  Chronic, stable    See Fall    Hypertension associated with diabetes  BP at goal on admission    -continue home antihypertensives    Renal/  Stage 3a chronic kidney disease  Kidney  function at baseline on admission    - Strict I&Os and daily weights   - daily CMP  - Strict I&Os  - Avoid nephrotoxic agents  - Renally adjust medications    Immunology/Multi System  Immunocompromised  Continue acyclovir    Oncology  Status post autologous bone marrow transplant  See MM    Metastatic multiple myeloma to bone  See MM    Multiple myeloma in relapse  MM s/p Auto SCT  - high risk MM with 17p deletion  - 4  year 2  months  s/p Auto SCT  - started maintenance q2w velcade 5/6/20   - serial biochemical relapse  Noted and  given this and high risk CG , transitioned to Sravanthi/Noble (1mg/m2) /dex  Salvage July 2021    - Due to uncontrolled hyperglycemia dex stopped after C4.    -supportive meds:   continue ppx acyclovir, asa; resume maintenance zometa q 3 months ;  she is past due to for next dose; will schedule with next sravanthi injection   -she  demonstrated mild biochemical progression over summer 2022 studies with no CRAB, in light of this and high risk CG   we transitioned her therapy from Sravanthi/Velcade to Sravanthi/pomalyst; intolerant to dex.     -Pomalyst dose decreased to  to 2mg (5/19/23) due to persistent neutropenia  now improved   -- biochemical studies from   4/15/24 cw no significant progression or CRAB; myeloma labs linked and drawn today unnecessarily today ; will fu on these    Endocrine  Type 2 diabetes mellitus with diabetic peripheral angiopathy without gangrene, with long-term current use of insulin  BGL at <140 on admission    -Regular diet  -POCT glucose AC/HS  -LDSSI if needed    Orthopedic  * Fall  Patient presenting following fall, which occurred 3 pm day prior to arrival  She denies LOC or head trauma  Reports she hit her knee, which is what brought her in  Has never fallen before   states she drags her right foot ever since CVA 2011  She is supposed to use walker for ambulation, but does not.  She was noted to be bradycardic in ED (to 40s)   Ton chart review, this appears to be chronic  issue   EKG sinus bradycardia, biphasic P waves, normal intervals, no ST/T wave abnormalities (similar to prior)    Mechanism of fall sounds mechanical as opposed to syncopal event    -f/u Echocardiogram  -f/u orthostatic vitals  -cardiology follow up outpatient        VTE Risk Mitigation (From admission, onward)           Ordered     Reason for No Pharmacological VTE Prophylaxis  Once        Question:  Reasons:  Answer:  Patient is Ambulatory    05/02/24 0730     IP VTE HIGH RISK PATIENT  Once         05/02/24 0730     Place sequential compression device  Until discontinued         05/02/24 0730                    Disposition: Home    Asa Davis MD  Bone Marrow Transplant  Hematology  [facility]

## 2024-05-02 NOTE — ED TRIAGE NOTES
Shelby Thompson, a 74 y.o. female presents to the ED w/ complaint of unwitnessed fall at home. Pt c/o R knee discomfort, unknown head trauma. Hx of dementia.     Triage note:  Chief Complaint   Patient presents with    Fall     Unwitnessed fall at home. Unknown exact situation due to dementia. Right knee discomfort present     Review of patient's allergies indicates:   Allergen Reactions    Lipitor [atorvastatin] Itching     Itching, elevated cpk, muscle aches, statin induced myositis     Past Medical History:   Diagnosis Date    Acute respiratory failure with hypoxia 4/30/2019    FUENTES (acute kidney injury) 5/1/2019    Allergy     Anemia     Aortic aneurysm     Breast cancer 10/2011    left breast Stage 0 DCIS    Cataract     Chronic diastolic congestive heart failure 11/6/2015    Colon polyp     Community acquired pneumonia of right lower lobe of lung 8/3/2021    GERD (gastroesophageal reflux disease)     Hiatal hernia     History of colonic polyps     Hx of psychiatric care     Zoloft    HX: breast cancer     Hyperlipemia     Hypertension     ICH (intracerebral hemorrhage)     Immunocompromised 10/28/2022    Major depressive disorder, single episode, mild 6/23/2016    Nuclear sclerosis 7/21/2014    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 7/21/2014    PAD (peripheral artery disease) 11/6/2015    Psychiatric problem     Statin-induced rhabdomyolysis     4/2015     Stroke 4/2011    Type 2 diabetes mellitus with diabetic peripheral angiopathy without gangrene, with long-term current use of insulin 3/31/2021    Type 2 diabetes mellitus without complication, without long-term current use of insulin 2/10/2020    Walker as ambulation aid

## 2024-05-03 ENCOUNTER — PATIENT OUTREACH (OUTPATIENT)
Dept: ADMINISTRATIVE | Facility: CLINIC | Age: 74
End: 2024-05-03
Payer: MEDICARE

## 2024-05-03 DIAGNOSIS — R55 SYNCOPE, UNSPECIFIED SYNCOPE TYPE: ICD-10-CM

## 2024-05-03 DIAGNOSIS — R00.1 BRADYCARDIA: Primary | ICD-10-CM

## 2024-05-06 RX ORDER — AMLODIPINE BESYLATE 5 MG/1
TABLET ORAL
Qty: 90 TABLET | Refills: 2 | Status: SHIPPED | OUTPATIENT
Start: 2024-05-06

## 2024-05-08 ENCOUNTER — OFFICE VISIT (OUTPATIENT)
Dept: HOME HEALTH SERVICES | Facility: CLINIC | Age: 74
End: 2024-05-08
Payer: MEDICARE

## 2024-05-08 DIAGNOSIS — R55 SYNCOPE, UNSPECIFIED SYNCOPE TYPE: ICD-10-CM

## 2024-05-08 DIAGNOSIS — R00.1 BRADYCARDIA: ICD-10-CM

## 2024-05-08 PROCEDURE — 1126F AMNT PAIN NOTED NONE PRSNT: CPT | Mod: CPTII,S$GLB,, | Performed by: NURSE PRACTITIONER

## 2024-05-08 PROCEDURE — 1159F MED LIST DOCD IN RCRD: CPT | Mod: CPTII,S$GLB,, | Performed by: NURSE PRACTITIONER

## 2024-05-08 PROCEDURE — 3061F NEG MICROALBUMINURIA REV: CPT | Mod: CPTII,S$GLB,, | Performed by: NURSE PRACTITIONER

## 2024-05-08 PROCEDURE — 3044F HG A1C LEVEL LT 7.0%: CPT | Mod: CPTII,S$GLB,, | Performed by: NURSE PRACTITIONER

## 2024-05-08 PROCEDURE — G0180 MD CERTIFICATION HHA PATIENT: HCPCS | Mod: ,,, | Performed by: INTERNAL MEDICINE

## 2024-05-08 PROCEDURE — 3074F SYST BP LT 130 MM HG: CPT | Mod: CPTII,S$GLB,, | Performed by: NURSE PRACTITIONER

## 2024-05-08 PROCEDURE — 1101F PT FALLS ASSESS-DOCD LE1/YR: CPT | Mod: CPTII,S$GLB,, | Performed by: NURSE PRACTITIONER

## 2024-05-08 PROCEDURE — 99496 TRANSJ CARE MGMT HIGH F2F 7D: CPT | Mod: S$GLB,,, | Performed by: NURSE PRACTITIONER

## 2024-05-08 PROCEDURE — 3072F LOW RISK FOR RETINOPATHY: CPT | Mod: CPTII,S$GLB,, | Performed by: NURSE PRACTITIONER

## 2024-05-08 PROCEDURE — 4010F ACE/ARB THERAPY RXD/TAKEN: CPT | Mod: CPTII,S$GLB,, | Performed by: NURSE PRACTITIONER

## 2024-05-08 PROCEDURE — 3066F NEPHROPATHY DOC TX: CPT | Mod: CPTII,S$GLB,, | Performed by: NURSE PRACTITIONER

## 2024-05-08 PROCEDURE — 1160F RVW MEDS BY RX/DR IN RCRD: CPT | Mod: CPTII,S$GLB,, | Performed by: NURSE PRACTITIONER

## 2024-05-08 PROCEDURE — 3078F DIAST BP <80 MM HG: CPT | Mod: CPTII,S$GLB,, | Performed by: NURSE PRACTITIONER

## 2024-05-08 PROCEDURE — 3288F FALL RISK ASSESSMENT DOCD: CPT | Mod: CPTII,S$GLB,, | Performed by: NURSE PRACTITIONER

## 2024-05-08 PROCEDURE — 1111F DSCHRG MED/CURRENT MED MERGE: CPT | Mod: CPTII,S$GLB,, | Performed by: NURSE PRACTITIONER

## 2024-05-09 ENCOUNTER — TELEPHONE (OUTPATIENT)
Dept: HOME HEALTH SERVICES | Facility: CLINIC | Age: 74
End: 2024-05-09
Payer: MEDICARE

## 2024-05-09 VITALS
TEMPERATURE: 98 F | HEART RATE: 61 BPM | DIASTOLIC BLOOD PRESSURE: 70 MMHG | SYSTOLIC BLOOD PRESSURE: 122 MMHG | RESPIRATION RATE: 15 BRPM

## 2024-05-09 DIAGNOSIS — I69.30 HISTORY OF CVA WITH RESIDUAL DEFICIT: ICD-10-CM

## 2024-05-09 DIAGNOSIS — R00.1 BRADYCARDIA: Primary | ICD-10-CM

## 2024-05-09 DIAGNOSIS — F01.A18 MILD VASCULAR DEMENTIA WITH OTHER BEHAVIORAL DISTURBANCE: ICD-10-CM

## 2024-05-09 DIAGNOSIS — R55 SYNCOPE, UNSPECIFIED SYNCOPE TYPE: ICD-10-CM

## 2024-05-09 NOTE — PATIENT INSTRUCTIONS
Instructions:  - Continue all medications, treatments and therapies as ordered.   - Follow all instructions, recommendations as discussed.  - Maintain Safety Precautions at all times.  - Attend all medical appointments as scheduled.  - For worsening symptoms: call Primary Care Physician or Nurse Practitioner.  - For emergencies, call 911 or immediately report to the nearest emergency room.  - Limit Risks of environmental exposure to coronavirus/COVID-19 as discussed including: social distancing, hand hygiene, and limiting departures from the home for necessities only.

## 2024-05-09 NOTE — ASSESSMENT & PLAN NOTE
--denies syncopal episodes since hospital discharge  --currently working with [facility] home health PT/OT  --heart rate within normal limits  --compliant with cardiac medications

## 2024-05-09 NOTE — PROGRESS NOTES
[facility] [place]  Transitional Care Management (TCM) Home Visit    Encounter Provider: Smooth Oseguera   PCP: Emanuel Arevalo Jr., MD  Consult Requested By: Dr. Elia Galeano  Admit Date: 5/2/24   IP Discharge Date: 5/2/24  Hospital Length of Stay: 1 day  Days since discharge (from IP or SNF): 6 days   [facility] On Call Contact Note: 5/3/24  Hospital Diagnosis: Bradycardia [R00.1];Syncope, unspecified syncope type [R55]     HISTORY OF PRESENT ILLNESS      Patient ID: Shelby Thompson is a 74 y.o. female was recently admitted to the hospital, this is their TCM encounter.    Hospital Course Synopsis:    Patient admitted for concern of syncope in setting of bradycardia. On interview, it appears patient most likely tripped and fell, especially in light of patient not using her walker to ambulate. CTH/CT cervical were without acute pathology. EKG sinus bradycardia, biphasic P waves, normal intervals, no ST/T wave abnormalities (similar to prior). Labs unrevealing of organic cause of fall: CPK 35, ABC 2.3, Hgb 9.2, Hct 30.5, Rkf390, Na 130, K 4.5, Cl 106, Bicarb 29, BGL 63, BUN 13, Cr 0.8, , Trop <0.006. an echocardiogram was obtain out of abundance of caution, which did not reveal any new structural heart disease. Per  at bedside, patient was at her mental baseline. She was medically cleared for discharge home with PCP and cardiology follow ups.       DECISION MAKING TODAY       Assessment & Plan:  1. Bradycardia  Assessment & Plan:  --denies syncopal episodes since hospital discharge  --currently working with [facility] home health PT/OT  --heart rate within normal limits  --compliant with cardiac medications    Orders:  -     Ambulatory referral/consult to [facility] [place] [facility]    2. Syncope, unspecified syncope type  -     Ambulatory referral/consult to [facility]         Medication List on Discharge:     Medication List            Accurate as of May 8, 2024 11:59 PM. If you have any  questions, ask your nurse or doctor.                CONTINUE taking these medications      acyclovir 400 MG tablet  Commonly known as: ZOVIRAX  TAKE 1 TABLET BY MOUTH TWICE A DAY     albuterol 1.25 mg/3 mL Nebu  Commonly known as: ACCUNEB  Take 3 mLs (1.25 mg total) by nebulization every 6 (six) hours as needed (wheeze/cough). Rescue     amLODIPine 5 MG tablet  Commonly known as: NORVASC  TAKE 1 TABLET BY MOUTH EVERY DAY     aspirin 81 MG EC tablet  Commonly known as: ECOTRIN  TAKE 1 TABLET BY MOUTH EVERY DAY     b complex vitamins tablet  Commonly known as: B COMPLEX-VITAMIN B12  Take 1 tablet by mouth once daily.     BELSOMRA 10 mg Tab  Generic drug: suvorexant  Take 1 tablet by mouth every evening.     cholecalciferol (vitamin D3) 125 mcg (5,000 unit) Tab  1 tablet DAILY (route: oral)     cyanocobalamin (vitamin B-12) 5,000 mcg Subl  Place one under tongue once a day for 1 month, then continue to take under tongue per week     DARZALEX FASPRO subcutaneous injection  Generic drug: daratumumab-hyaluronidase-fij     donepeziL 5 MG tablet  Commonly known as: ARICEPT  Take 1 tablet (5 mg total) by mouth every evening.     ENGERIX-B (PF) 20 mcg/mL Syrg  Generic drug: hepatitis B virus vacc.rec(PF)     ferrous gluconate 324 mg (37.5 mg iron) Tab tablet  1 tablet DAILY (route: oral)     gabapentin 300 MG capsule  Commonly known as: NEURONTIN  Take 1 capsule (300 mg total) by mouth 2 (two) times daily.     GADAVIST 1 mmol/mL (604.72 mg/mL) Soln injection  Generic drug: gadobutroL     loperamide 2 mg capsule  Commonly known as: IMODIUM  TAKE 1 CAPSULE BY MOUTH 4 TIMES DAILY AS NEEDED FOR DIARRHEA.     magnesium oxide 400 mg (241.3 mg magnesium) tablet  Commonly known as: MAG-OX  TAKE 2 TABLETS BY MOUTH 2 TIMES DAILY.     metFORMIN 1000 MG tablet  Commonly known as: GLUCOPHAGE  Take 1 tablet (1,000 mg total) by mouth 2 (two) times daily with meals.     omeprazole 40 MG capsule  Commonly known as: PRILOSEC  TAKE 1 CAPSULE  BY MOUTH EVERY DAY     OXYGEN-AIR DELIVERY SYSTEMS MISC     pomalidomide 2 mg Cap  TAKE 1 CAPSULE (2MG) BY MOUTH EVERY DAY on days 1-21 of a 28 day cycle.  Auth #  [id number] 5/1/24.     * potassium chloride SA 20 MEQ tablet  Commonly known as: K-DUR,KLOR-CON     * potassium chloride SA 20 MEQ tablet  Commonly known as: K-DUR,KLOR-CON  Take 1 tablet (20 mEq total) by mouth once daily.     REPATHA SURECLICK 140 mg/mL Pnij  Generic drug: evolocumab  Inject 1 mL (140 mg total) into the skin every 14 (fourteen) days.     sertraline 50 MG tablet  Commonly known as: ZOLOFT  Take 1 tablet (50 mg total) by mouth once daily.     valsartan 160 MG tablet  Commonly known as: DIOVAN  Take 1 tablet (160 mg total) by mouth once daily.     VENOFER 100 mg iron/5 mL injection  Generic drug: iron sucrose     zoledronic 4 mg/100 mL Pgbk infusion     zoledronic acid 4 mg/5 mL injection  Commonly known as: ZOMETA           * This list has 2 medication(s) that are the same as other medications prescribed for you. Read the directions carefully, and ask your doctor or other care provider to review them with you.                  Medication Reconciliation:  Were medications changed on discharge? Yes  Were medications in the home? Yes  Is the patient taking the medications as directed? Yes  Does the patient understand the medications and changes? Yes  Does updated med list accurately reflects meds patient is currently taking? Yes    ENVIRONMENT OF CARE      Family and/or Caregiver present at visit?  Yes  Name of Caregiver:    History provided by: caregiver    Advance Care Planning   Advanced Care Planning Status:  Patient has had an ACP conversation  Living Will: No  Power of : No  [place]: No    Does Caregiver have HCPoA: No  Changes today: none; hospice discussed and refused; PC ordered  Is patient hospice appropriate: Yes  (If needed, use PPS <30 or FAST score >7)  Was referral to hospice placed: No       Impression upon  entering the home:  Physical Dwelling: apartment/condo   Appearance of home environment: cleaniness: clean  Functional Status: moderate assistance  Mobility: ambulatory with device  Nutritional access:  Decreased per   Home Health: Yes,  Ag[facility]    DME/Supplies: rolling walker     Diagnostic tests reviewed/disposition: No diagnosic tests pending after this hospitalization.  Disease/illness education:  bradycardia/GOC  Establishment or re-establishment of referral orders for community resources: No other necessary community resources.   Discussion with other health care providers: No discussion with other health care providers necessary.   Does patient have a PCP at [place]? Yes   Repatriation plan with PCP? follow-up with PCP within 90d   Does patient have an ostomy (ileostomy, colostomy, suprapubic catheter, nephrostomy tube, tracheostomy, PEG tube, pleurex catheter, cholecystostomy, etc)? No  Were BPAs reviewed? Yes    Social History     Socioeconomic History    Marital status:      Spouse name: Moises    Number of children: 2   Tobacco Use    Smoking status: Former     Current packs/day: 0.00     Average packs/day: 0.3 packs/day for 44.2 years (11.1 ttl pk-yrs)     Types: Cigarettes     Start date:      Quit date: 2011     Years since quittin.1    Smokeless tobacco: Never   Substance and Sexual Activity    Alcohol use: Not Currently     Comment: none since 2019, very rare wine     Drug use: No    Sexual activity: Not Currently     Partners: Male     Birth control/protection: Post-menopausal   Social History Narrative     -Moises    2 children (Delia Li)     Social Determinants of Health     Financial Resource Strain: Low Risk  (3/28/2024)    Overall Financial Resource Strain (CARDIA)     Difficulty of Paying Living Expenses: Not very hard   Food Insecurity: Food Insecurity Present (3/28/2024)    Hunger Vital Sign     Worried About Running Out of Food  in the Last Year: Sometimes true     Ran Out of Food in the Last Year: Sometimes true   Transportation Needs: No Transportation Needs (3/28/2024)    PRAPARE - Transportation     Lack of Transportation (Medical): No     Lack of Transportation (Non-Medical): No   Physical Activity: Insufficiently Active (3/28/2024)    Exercise Vital Sign     Days of Exercise per Week: 2 days     Minutes of Exercise per Session: 10 min   Stress: No Stress Concern Present (3/28/2024)    [employer] - Occupational Stress Questionnaire     Feeling of Stress : Only a little   Housing Stability: Low Risk  (3/28/2024)    Housing Stability Vital Sign     Unable to Pay for Housing in the Last Year: No     Number of Places Lived in the Last Year: 1     Unstable Housing in the Last Year: No       OBJECTIVE:     Vital Signs:  Vitals:    05/08/24 1200   BP: 122/70   Pulse: 61   Resp: 15   Temp: 97.8 °F (36.6 °C)       Review of Systems   Unable to perform ROS: Dementia     Physical Exam:  Physical Exam  Vitals and nursing note reviewed.   HENT:      Head: Normocephalic.   Eyes:      General: Lids are normal.   Cardiovascular:      Rate and Rhythm: Normal rate.   Pulmonary:      Effort: Pulmonary effort is normal.      Breath sounds: Normal breath sounds and air entry.   Abdominal:      General: There is no distension.      Palpations: Abdomen is soft.   Musculoskeletal:      Cervical back: Normal range of motion.   Skin:     General: Skin is warm and dry.   Neurological:      Mental Status: She is alert. Mental status is at baseline.      GCS: GCS eye subscore is 4. GCS verbal subscore is 4. GCS motor subscore is 6.   Psychiatric:         Attention and Perception: Attention normal.         Mood and Affect: Mood normal.         Speech: Speech normal.     INSTRUCTIONS FOR PATIENT:     Scheduled Follow-up, Appts Reviewed with Modifications if Needed: Yes closer  Future Appointments   Date Time Provider Department  [place]   5/13/2024  3:00 PM NURSE 4, [facility] CHEMO [facility] CHEMO [facility]   6/10/2024  8:20 AM [facility] LAB BMT [facility] LABBMT [facility]   6/10/2024  9:20 AM Jere Tineo MD [facility] HC BMT [facility]   6/10/2024 11:00 AM CHAIR 12, [facility] BMT INF [facility] BMT INF [facility] BMT   8/13/2024 10:00 AM Tico Florentino MD [facility] HNSO [facility]   8/19/2024 10:00 AM Emanuel Arevalo Jr., MD [facility] IM [facility] PCW       Signature: Smooth Oseguera NP    Transition of Care Visit:  I have reviewed and updated the history and problem list.  I have reconciled the medication list.  I have discussed the hospitalization and current medical issues, prognosis and plans with the patient/family.

## 2024-05-09 NOTE — TELEPHONE ENCOUNTER
Orders placed per NP request for in home palliative care.  Orders faxed to [facility].  Fax confirmation received.

## 2024-05-11 RX ORDER — DIPHENHYDRAMINE HYDROCHLORIDE 50 MG/ML
50 INJECTION INTRAMUSCULAR; INTRAVENOUS ONCE AS NEEDED
Status: CANCELLED | OUTPATIENT
Start: 2024-05-15 | End: 2035-10-11

## 2024-05-11 RX ORDER — EPINEPHRINE 0.3 MG/.3ML
0.3 INJECTION SUBCUTANEOUS ONCE AS NEEDED
Status: CANCELLED | OUTPATIENT
Start: 2024-05-15 | End: 2035-10-11

## 2024-05-13 ENCOUNTER — INFUSION (OUTPATIENT)
Dept: INFUSION THERAPY | Facility: HOSPITAL | Age: 74
End: 2024-05-13
Payer: MEDICARE

## 2024-05-13 VITALS
HEART RATE: 62 BPM | SYSTOLIC BLOOD PRESSURE: 135 MMHG | RESPIRATION RATE: 18 BRPM | DIASTOLIC BLOOD PRESSURE: 61 MMHG | WEIGHT: 128.75 LBS | TEMPERATURE: 98 F | HEIGHT: 62 IN | OXYGEN SATURATION: 98 % | BODY MASS INDEX: 23.69 KG/M2

## 2024-05-13 DIAGNOSIS — C90.02 MULTIPLE MYELOMA IN RELAPSE: Primary | ICD-10-CM

## 2024-05-13 PROCEDURE — 63600175 PHARM REV CODE 636 W HCPCS: Mod: JZ,JG,HCNC | Performed by: INTERNAL MEDICINE

## 2024-05-13 PROCEDURE — 96401 CHEMO ANTI-NEOPL SQ/IM: CPT | Mod: HCNC

## 2024-05-13 RX ORDER — EPINEPHRINE 0.3 MG/.3ML
0.3 INJECTION SUBCUTANEOUS ONCE AS NEEDED
Status: DISCONTINUED | OUTPATIENT
Start: 2024-05-13 | End: 2024-05-13 | Stop reason: HOSPADM

## 2024-05-13 RX ORDER — DIPHENHYDRAMINE HYDROCHLORIDE 50 MG/ML
50 INJECTION INTRAMUSCULAR; INTRAVENOUS ONCE AS NEEDED
Status: DISCONTINUED | OUTPATIENT
Start: 2024-05-13 | End: 2024-05-13 | Stop reason: HOSPADM

## 2024-05-13 RX ADMIN — DARATUMUMAB AND HYALURONIDASE-FIHJ (HUMAN RECOMBINANT) 1800 MG: 1800; 30000 INJECTION SUBCUTANEOUS at 04:05

## 2024-05-13 NOTE — PLAN OF CARE
"  Problem: Fatigue  Goal: Improved Activity Tolerance  Outcome: Progressing  Intervention: Promote Improved Energy  Flowsheets (Taken 5/13/2024 3648)  Fatigue Management:   activity schedule adjusted   activity assistance provided   fatigue-related activity identified   frequent rest breaks encouraged   paced activity encouraged  Sleep/Rest Enhancement:   consistent schedule promoted   family presence promoted   natural light exposure provided   awakenings minimized   noise level reduced   reading promoted   regular sleep/rest pattern promoted   relaxation techniques promoted  Activity Management:   Ambulated -L4   Up in chair - L3  Environmental Support:   calm environment promoted   caregiver consistency promoted   distractions minimized   comfort object encouraged   environmental consistency promoted   personal routine supported   rest periods encouraged.  /61 (Patient Position: Sitting)   Pulse 62   Temp 98.3 °F (36.8 °C)   Resp 18   Ht 5' 2" (1.575 m)   Wt 58.4 kg (128 lb 12 oz)   LMP  (LMP Unknown)   SpO2 98%   BMI 23.55 kg/m² Pleasant, alert and oriented patient to Chemo Infusion per self with daughter for C35 Darzalex SQ injection to abdomin - VSS and Darzalex administered over 3-5 min, no AVE's, band-aide applied and patient discharged to home with no concerns - RTC on 6/10/24       "

## 2024-05-29 ENCOUNTER — EXTERNAL HOME HEALTH (OUTPATIENT)
Dept: HOME HEALTH SERVICES | Facility: HOSPITAL | Age: 74
End: 2024-05-29
Payer: MEDICARE

## 2024-06-05 ENCOUNTER — DOCUMENT SCAN (OUTPATIENT)
Dept: HOME HEALTH SERVICES | Facility: HOSPITAL | Age: 74
End: 2024-06-05
Payer: MEDICARE

## 2024-06-05 DIAGNOSIS — T50.905A DRUG-INDUCED CYTOPENIA: ICD-10-CM

## 2024-06-05 DIAGNOSIS — D75.9 DRUG-INDUCED CYTOPENIA: ICD-10-CM

## 2024-06-05 DIAGNOSIS — C90.00 MULTIPLE MYELOMA, REMISSION STATUS UNSPECIFIED: ICD-10-CM

## 2024-06-05 NOTE — TELEPHONE ENCOUNTER
"----- Message from Seamus Quevedo sent at 6/5/2024  2:52 PM CDT -----  RX Name and Strength:  pomalidomide 2 mg Cap       How is the patient currently taking it?  TAKE 1 CAPSULE (2MG) BY MOUTH EVERY DAY on days 1-21 of a 28 day cycle.      Is this a 30 day or 90 day Rx? 21 capsule      Preferred Pharmacy with phone number:     [facility] - [place], [place] - [address]  [address] [address]  Phone: [phone]   Fax: [phone]      Local or Mail Order: Mail Order      Ordering Provider: Noman      Contact Preference: [phone]       Additional Information: Pharmacy also requesting celgene auth    "Thank you for all that you do for our patients"  "

## 2024-06-10 ENCOUNTER — PATIENT MESSAGE (OUTPATIENT)
Dept: INTERNAL MEDICINE | Facility: CLINIC | Age: 74
End: 2024-06-10
Payer: MEDICARE

## 2024-06-10 ENCOUNTER — LAB VISIT (OUTPATIENT)
Dept: LAB | Facility: HOSPITAL | Age: 74
End: 2024-06-10
Attending: INTERNAL MEDICINE
Payer: MEDICARE

## 2024-06-10 ENCOUNTER — INFUSION (OUTPATIENT)
Dept: INFUSION THERAPY | Facility: HOSPITAL | Age: 74
End: 2024-06-10
Payer: MEDICARE

## 2024-06-10 ENCOUNTER — OFFICE VISIT (OUTPATIENT)
Dept: HEMATOLOGY/ONCOLOGY | Facility: CLINIC | Age: 74
End: 2024-06-10
Payer: MEDICARE

## 2024-06-10 VITALS
BODY MASS INDEX: 24.68 KG/M2 | HEART RATE: 51 BPM | RESPIRATION RATE: 8 BRPM | SYSTOLIC BLOOD PRESSURE: 112 MMHG | OXYGEN SATURATION: 96 % | TEMPERATURE: 98 F | WEIGHT: 134.13 LBS | DIASTOLIC BLOOD PRESSURE: 60 MMHG | HEIGHT: 62 IN

## 2024-06-10 VITALS
TEMPERATURE: 98 F | BODY MASS INDEX: 24.68 KG/M2 | OXYGEN SATURATION: 96 % | DIASTOLIC BLOOD PRESSURE: 67 MMHG | WEIGHT: 134.13 LBS | RESPIRATION RATE: 18 BRPM | HEART RATE: 46 BPM | HEIGHT: 62 IN | SYSTOLIC BLOOD PRESSURE: 121 MMHG

## 2024-06-10 DIAGNOSIS — C90.00 MULTIPLE MYELOMA, REMISSION STATUS UNSPECIFIED: Primary | ICD-10-CM

## 2024-06-10 DIAGNOSIS — C90.02 MULTIPLE MYELOMA IN RELAPSE: Primary | ICD-10-CM

## 2024-06-10 DIAGNOSIS — T45.1X5A LEUKOPENIA DUE TO ANTINEOPLASTIC CHEMOTHERAPY: ICD-10-CM

## 2024-06-10 DIAGNOSIS — D70.1 LEUKOPENIA DUE TO ANTINEOPLASTIC CHEMOTHERAPY: ICD-10-CM

## 2024-06-10 DIAGNOSIS — C90.00 MULTIPLE MYELOMA, REMISSION STATUS UNSPECIFIED: ICD-10-CM

## 2024-06-10 DIAGNOSIS — D63.0 ANEMIA IN NEOPLASTIC DISEASE: ICD-10-CM

## 2024-06-10 DIAGNOSIS — G62.0 CHEMOTHERAPY-INDUCED PERIPHERAL NEUROPATHY: ICD-10-CM

## 2024-06-10 DIAGNOSIS — G47.00 INSOMNIA, UNSPECIFIED TYPE: ICD-10-CM

## 2024-06-10 DIAGNOSIS — Z94.84 HISTORY OF AUTO STEM CELL TRANSPLANT: ICD-10-CM

## 2024-06-10 DIAGNOSIS — Z79.899 IMMUNODEFICIENCY DUE TO DRUGS: ICD-10-CM

## 2024-06-10 DIAGNOSIS — D84.821 IMMUNODEFICIENCY DUE TO DRUGS: ICD-10-CM

## 2024-06-10 DIAGNOSIS — F03.90 DEMENTIA, UNSPECIFIED DEMENTIA SEVERITY, UNSPECIFIED DEMENTIA TYPE, UNSPECIFIED WHETHER BEHAVIORAL, PSYCHOTIC, OR MOOD DISTURBANCE OR ANXIETY: ICD-10-CM

## 2024-06-10 DIAGNOSIS — T45.1X5A CHEMOTHERAPY-INDUCED PERIPHERAL NEUROPATHY: ICD-10-CM

## 2024-06-10 LAB
ALBUMIN SERPL BCP-MCNC: 3.8 G/DL (ref 3.5–5.2)
ALP SERPL-CCNC: 55 U/L (ref 55–135)
ALT SERPL W/O P-5'-P-CCNC: 11 U/L (ref 10–44)
ANION GAP SERPL CALC-SCNC: 8 MMOL/L (ref 8–16)
AST SERPL-CCNC: 12 U/L (ref 10–40)
BASOPHILS # BLD AUTO: 0.04 K/UL (ref 0–0.2)
BASOPHILS NFR BLD: 1.7 % (ref 0–1.9)
BILIRUB SERPL-MCNC: 0.3 MG/DL (ref 0.1–1)
BUN SERPL-MCNC: 22 MG/DL (ref 8–23)
CALCIUM SERPL-MCNC: 9.9 MG/DL (ref 8.7–10.5)
CHLORIDE SERPL-SCNC: 108 MMOL/L (ref 95–110)
CO2 SERPL-SCNC: 26 MMOL/L (ref 23–29)
CREAT SERPL-MCNC: 0.9 MG/DL (ref 0.5–1.4)
DIFFERENTIAL METHOD BLD: ABNORMAL
EOSINOPHIL # BLD AUTO: 0.1 K/UL (ref 0–0.5)
EOSINOPHIL NFR BLD: 2.5 % (ref 0–8)
ERYTHROCYTE [DISTWIDTH] IN BLOOD BY AUTOMATED COUNT: 16.9 % (ref 11.5–14.5)
EST. GFR  (NO RACE VARIABLE): >60 ML/MIN/1.73 M^2
FERRITIN SERPL-MCNC: 85 NG/ML (ref 20–300)
GLUCOSE SERPL-MCNC: 79 MG/DL (ref 70–110)
HCT VFR BLD AUTO: 31.1 % (ref 37–48.5)
HGB BLD-MCNC: 9.1 G/DL (ref 12–16)
IGA SERPL-MCNC: 99 MG/DL (ref 40–350)
IGG SERPL-MCNC: 1070 MG/DL (ref 650–1600)
IGM SERPL-MCNC: 12 MG/DL (ref 50–300)
IMM GRANULOCYTES # BLD AUTO: 0 K/UL (ref 0–0.04)
IMM GRANULOCYTES NFR BLD AUTO: 0 % (ref 0–0.5)
IRON SERPL-MCNC: 50 UG/DL (ref 30–160)
LYMPHOCYTES # BLD AUTO: 0.8 K/UL (ref 1–4.8)
LYMPHOCYTES NFR BLD: 32.9 % (ref 18–48)
MCH RBC QN AUTO: 26.4 PG (ref 27–31)
MCHC RBC AUTO-ENTMCNC: 29.3 G/DL (ref 32–36)
MCV RBC AUTO: 90 FL (ref 82–98)
MONOCYTES # BLD AUTO: 0.4 K/UL (ref 0.3–1)
MONOCYTES NFR BLD: 15.8 % (ref 4–15)
NEUTROPHILS # BLD AUTO: 1.1 K/UL (ref 1.8–7.7)
NEUTROPHILS NFR BLD: 47.1 % (ref 38–73)
NRBC BLD-RTO: 0 /100 WBC
PLATELET # BLD AUTO: 166 K/UL (ref 150–450)
PMV BLD AUTO: 11.2 FL (ref 9.2–12.9)
POTASSIUM SERPL-SCNC: 4.1 MMOL/L (ref 3.5–5.1)
PROT SERPL-MCNC: 6.7 G/DL (ref 6–8.4)
RBC # BLD AUTO: 3.45 M/UL (ref 4–5.4)
SATURATED IRON: 14 % (ref 20–50)
SODIUM SERPL-SCNC: 142 MMOL/L (ref 136–145)
TOTAL IRON BINDING CAPACITY: 369 UG/DL (ref 250–450)
TRANSFERRIN SERPL-MCNC: 249 MG/DL (ref 200–375)
WBC # BLD AUTO: 2.4 K/UL (ref 3.9–12.7)

## 2024-06-10 PROCEDURE — 1101F PT FALLS ASSESS-DOCD LE1/YR: CPT | Mod: HCNC,CPTII,S$GLB,

## 2024-06-10 PROCEDURE — 99214 OFFICE O/P EST MOD 30 MIN: CPT | Mod: HCNC,S$GLB,,

## 2024-06-10 PROCEDURE — 3072F LOW RISK FOR RETINOPATHY: CPT | Mod: HCNC,CPTII,S$GLB,

## 2024-06-10 PROCEDURE — 3061F NEG MICROALBUMINURIA REV: CPT | Mod: HCNC,CPTII,S$GLB,

## 2024-06-10 PROCEDURE — 3078F DIAST BP <80 MM HG: CPT | Mod: HCNC,CPTII,S$GLB,

## 2024-06-10 PROCEDURE — 99999 PR PBB SHADOW E&M-EST. PATIENT-LVL III: CPT | Mod: PBBFAC,HCNC,, | Performed by: INTERNAL MEDICINE

## 2024-06-10 PROCEDURE — 82728 ASSAY OF FERRITIN: CPT | Mod: HCNC | Performed by: INTERNAL MEDICINE

## 2024-06-10 PROCEDURE — 1126F AMNT PAIN NOTED NONE PRSNT: CPT | Mod: HCNC,CPTII,S$GLB,

## 2024-06-10 PROCEDURE — 3288F FALL RISK ASSESSMENT DOCD: CPT | Mod: HCNC,CPTII,S$GLB,

## 2024-06-10 PROCEDURE — 84165 PROTEIN E-PHORESIS SERUM: CPT | Mod: 26,HCNC,, | Performed by: PATHOLOGY

## 2024-06-10 PROCEDURE — 80053 COMPREHEN METABOLIC PANEL: CPT | Mod: HCNC | Performed by: INTERNAL MEDICINE

## 2024-06-10 PROCEDURE — 84165 PROTEIN E-PHORESIS SERUM: CPT | Mod: HCNC | Performed by: INTERNAL MEDICINE

## 2024-06-10 PROCEDURE — 83540 ASSAY OF IRON: CPT | Mod: HCNC | Performed by: INTERNAL MEDICINE

## 2024-06-10 PROCEDURE — 96401 CHEMO ANTI-NEOPL SQ/IM: CPT | Mod: HCNC

## 2024-06-10 PROCEDURE — 3066F NEPHROPATHY DOC TX: CPT | Mod: HCNC,CPTII,S$GLB,

## 2024-06-10 PROCEDURE — 82784 ASSAY IGA/IGD/IGG/IGM EACH: CPT | Mod: 59,HCNC | Performed by: INTERNAL MEDICINE

## 2024-06-10 PROCEDURE — 4010F ACE/ARB THERAPY RXD/TAKEN: CPT | Mod: HCNC,CPTII,S$GLB,

## 2024-06-10 PROCEDURE — 3044F HG A1C LEVEL LT 7.0%: CPT | Mod: HCNC,CPTII,S$GLB,

## 2024-06-10 PROCEDURE — 85025 COMPLETE CBC W/AUTO DIFF WBC: CPT | Mod: HCNC | Performed by: INTERNAL MEDICINE

## 2024-06-10 PROCEDURE — 63600175 PHARM REV CODE 636 W HCPCS: Mod: JZ,JG,HCNC | Performed by: INTERNAL MEDICINE

## 2024-06-10 PROCEDURE — 83521 IG LIGHT CHAINS FREE EACH: CPT | Mod: HCNC | Performed by: INTERNAL MEDICINE

## 2024-06-10 PROCEDURE — 36415 COLL VENOUS BLD VENIPUNCTURE: CPT | Mod: HCNC | Performed by: INTERNAL MEDICINE

## 2024-06-10 PROCEDURE — 3074F SYST BP LT 130 MM HG: CPT | Mod: HCNC,CPTII,S$GLB,

## 2024-06-10 RX ORDER — EPINEPHRINE 0.3 MG/.3ML
0.3 INJECTION SUBCUTANEOUS ONCE AS NEEDED
Status: CANCELLED | OUTPATIENT
Start: 2024-06-12

## 2024-06-10 RX ORDER — DIPHENHYDRAMINE HYDROCHLORIDE 50 MG/ML
50 INJECTION INTRAMUSCULAR; INTRAVENOUS ONCE AS NEEDED
Status: DISCONTINUED | OUTPATIENT
Start: 2024-06-10 | End: 2024-06-10 | Stop reason: HOSPADM

## 2024-06-10 RX ORDER — TRAZODONE HYDROCHLORIDE 50 MG/1
50 TABLET ORAL NIGHTLY
COMMUNITY
Start: 2024-06-03

## 2024-06-10 RX ORDER — EPINEPHRINE 0.3 MG/.3ML
0.3 INJECTION SUBCUTANEOUS ONCE AS NEEDED
OUTPATIENT
Start: 2024-07-10

## 2024-06-10 RX ORDER — DIPHENHYDRAMINE HYDROCHLORIDE 50 MG/ML
50 INJECTION INTRAMUSCULAR; INTRAVENOUS ONCE AS NEEDED
Status: CANCELLED | OUTPATIENT
Start: 2024-06-12

## 2024-06-10 RX ORDER — DIPHENHYDRAMINE HYDROCHLORIDE 50 MG/ML
50 INJECTION INTRAMUSCULAR; INTRAVENOUS ONCE AS NEEDED
OUTPATIENT
Start: 2024-07-10

## 2024-06-10 RX ORDER — EPINEPHRINE 0.3 MG/.3ML
0.3 INJECTION SUBCUTANEOUS ONCE AS NEEDED
Status: DISCONTINUED | OUTPATIENT
Start: 2024-06-10 | End: 2024-06-10 | Stop reason: HOSPADM

## 2024-06-10 RX ADMIN — DARATUMUMAB AND HYALURONIDASE-FIHJ (HUMAN RECOMBINANT) 1800 MG: 1800; 30000 INJECTION SUBCUTANEOUS at 10:06

## 2024-06-10 NOTE — PROGRESS NOTES
Section of Hematology and Stem Cell Transplantation  Follow-Up Note     06/10/2024    Primary Oncologic Diagnosis: Multiple myeloma, remission status unspecified [C90.00]    History of Present Ilness:   Shelby Thompson (Shelby) is a pleasant 74 y.o.female of [facility] [place] is here for follow-up of her multiple myeloma. Past medical history 2 prior CVAs (one in 2008, one in 2011)  L breast cancer DCIS stage 0 (s/p lumpectomy and radiation, no endocrine tx due to CVA), HTN, DM II, Neuropathy, b/l hands, prior smoker, quit in 2011, Statin Intolerance - on praluent, PCSK9 inhibitor  Hepatic lesions - vascular per recent MRI in 09 /2019 with no changes; will confirm no further rascon needed from help, HFpEF - EF 55%, diastolic dysfunction, Anxiety/Depression, and dementia.    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  She achieved and tolerated well VRd x completing 7, 28 day cycles and achieving deep VGPR to induction. Then underwent Melphalan autologous stem cell transplant on 2/12/2020.     ADMISSION SUMMARY: 2/10-2/26/2020  Admitted 2/10, tolerated chemo and transplant well. Engrafted on day +12. Remained afebrile throughout hospital stay and no mucositis. Experienced expected side effects of nausea and diarrhea controlled with antiemetics and Imodium. Discharged home with home PT/OT    Interval History 4/30/24:  Ms. Thompson is a 74 y.o. female who presents for  follow up.  She is 3 yrs 2  months post AutoSCT. Relapsed  at approximately  1 year post sct and was on Sravanthi/zhanna/dex until summer 2022 when changed to sravanthi/pom/dex due to biochemical only progression. Dex stopped due to mood issues.  Remains on sravanthi/pom  salvage. Tolerating dose adjusted pomalyst.  Per friend accompanying  Her dementia  "continues to worsen  from last appt.  Infirmed  who is her caretaker having issues caring for her at  home. They have established neurologist.     Interval History 06/10/2024:  Patient reports she is doing well; her friend, Harriet Moreno is here with her, and her dementia is "kicking her butt." Her friend reports that she now has some home health with a NP and  who each come once a month. They are supposed to help set up additional services. The NP from home health prescribed her Trazadone 50 mg at night; this is helping her sleep better. She lives at home with her . She is not a good historian at this time due to her ongoing dementia. Denies fevers, chills, night sweats, unexplained weight loss, N/V/D, chest pain or shortness of breath. Her treatment is right after this appointment.    Past Medical History, Social History, and Past Family History are unchanged since last evaluation except for HPI.    CURRENT MEDICATIONS:   Current Outpatient Medications   Medication Sig    acyclovir (ZOVIRAX) 400 MG tablet TAKE 1 TABLET BY MOUTH TWICE A DAY    albuterol (ACCUNEB) 1.25 mg/3 mL Nebu Take 3 mLs (1.25 mg total) by nebulization every 6 (six) hours as needed (wheeze/cough). Rescue    amLODIPine (NORVASC) 5 MG tablet TAKE 1 TABLET BY MOUTH EVERY DAY    aspirin (ECOTRIN) 81 MG EC tablet TAKE 1 TABLET BY MOUTH EVERY DAY    b complex vitamins (B COMPLEX-VITAMIN B12) tablet Take 1 tablet by mouth once daily.    cholecalciferol, vitamin D3, 125 mcg (5,000 unit) Tab 1 tablet DAILY (route: oral)    cyanocobalamin, vitamin B-12, 5,000 mcg Subl Place one under tongue once a day for 1 month, then continue to take under tongue per week    DARZALEX FASPRO 1,800 mg-30,000 unit/15 mL Soln     donepeziL (ARICEPT) 5 MG tablet Take 1 tablet (5 mg total) by mouth every evening.    ENGERIX-B, PF, 20 mcg/mL Syrg     evolocumab (REPATHA SURECLICK) 140 mg/mL PnIj Inject 1 mL (140 mg total) into the skin every 14 " (fourteen) days.    ferrous gluconate 324 mg (37.5 mg iron) Tab tablet 1 tablet DAILY (route: oral)    gabapentin (NEURONTIN) 300 MG capsule Take 1 capsule (300 mg total) by mouth 2 (two) times daily.    GADAVIST 1 mmol/mL (604.72 mg/mL) Soln injection     loperamide (IMODIUM) 2 mg capsule TAKE 1 CAPSULE BY MOUTH 4 TIMES DAILY AS NEEDED FOR DIARRHEA.    magnesium oxide (MAG-OX) 400 mg (241.3 mg magnesium) tablet TAKE 2 TABLETS BY MOUTH 2 TIMES DAILY.    metFORMIN (GLUCOPHAGE) 1000 MG tablet Take 1 tablet (1,000 mg total) by mouth 2 (two) times daily with meals.    omeprazole (PRILOSEC) 40 MG capsule TAKE 1 CAPSULE BY MOUTH EVERY DAY    OXYGEN-AIR DELIVERY SYSTEMS MISC     pomalidomide 2 mg Cap TAKE 1 CAPSULE (2MG) BY MOUTH EVERY DAY on days 1-21 of a 28 day cycle.  Auth # [id number] 6/5/24.    potassium chloride SA (K-DUR,KLOR-CON) 20 MEQ tablet     potassium chloride SA (K-DUR,KLOR-CON) 20 MEQ tablet Take 1 tablet (20 mEq total) by mouth once daily.    sertraline (ZOLOFT) 50 MG tablet Take 1 tablet (50 mg total) by mouth once daily.    suvorexant (BELSOMRA) 10 mg Tab Take 1 tablet by mouth every evening.    traZODone (DESYREL) 50 MG tablet Take 50 mg by mouth every evening.    valsartan (DIOVAN) 160 MG tablet Take 1 tablet (160 mg total) by mouth once daily.    VENOFER 100 mg iron/5 mL injection     zoledronic 4 mg/100 mL PgBk infusion     zoledronic acid (ZOMETA) 4 mg/5 mL injection      No current facility-administered medications for this visit.       ALLERGIES:   Review of patient's allergies indicates:   Allergen Reactions    Lipitor [atorvastatin] Itching     Itching, elevated cpk, muscle aches, statin induced myositis       Review of Systems:     Review of Systems   Constitutional:  Negative for chills, fever and weight loss.   HENT:  Negative for congestion and sinus pain.    Respiratory:  Negative for cough and shortness of breath.    Cardiovascular:  Negative for chest pain.   Gastrointestinal:  Negative  for constipation, diarrhea, nausea and vomiting.   Genitourinary:  Negative for dysuria.   Musculoskeletal:  Negative for back pain.   Neurological:  Positive for weakness. Negative for dizziness and headaches.   Endo/Heme/Allergies:  Does not bruise/bleed easily.   Psychiatric/Behavioral:  Positive for memory loss. The patient does not have insomnia.      Physical Exam:     Vitals:    06/10/24 0926   BP: 121/67   Pulse: (!) 46   Resp: 18   Temp: 97.9 °F (36.6 °C)     Physical Exam  Vitals reviewed.   Constitutional:       General: She is not in acute distress.     Appearance: Normal appearance. She is normal weight. She is not ill-appearing.   HENT:      Head: Normocephalic and atraumatic.      Nose: No congestion.      Mouth/Throat:      Mouth: Mucous membranes are moist.      Pharynx: Oropharyngeal exudate present.   Eyes:      Pupils: Pupils are equal, round, and reactive to light.   Cardiovascular:      Rate and Rhythm: Regular rhythm. Bradycardia present.      Pulses: Normal pulses.      Heart sounds: Normal heart sounds.   Pulmonary:      Effort: Pulmonary effort is normal.      Breath sounds: Normal breath sounds. No wheezing.   Abdominal:      General: Bowel sounds are normal.      Palpations: Abdomen is soft.      Tenderness: There is no abdominal tenderness.   Musculoskeletal:         General: Normal range of motion.      Cervical back: Normal range of motion and neck supple. No rigidity.      Right lower leg: No edema.      Left lower leg: No edema.   Skin:     General: Skin is warm and dry.      Capillary Refill: Capillary refill takes less than 2 seconds.      Findings: No lesion.   Neurological:      Mental Status: She is alert. Mental status is at baseline. She is disoriented.      Motor: Weakness present.      Comments: Hand tremors noted, R>L   Psychiatric:         Mood and Affect: Mood normal.         Behavior: Behavior normal.      ECOG Performance Status: (foot note - ECOG PS provided by [employer]  Cooperative Oncology Group) 1 - Symptomatic but completely ambulatory    Karnofsky Performance Score:  50%- Requires Considerable Assistance and Frequent Medical Care    Labs:   Lab Results   Component Value Date    WBC 2.40 (L) 06/10/2024    HGB 9.1 (L) 06/10/2024    HCT 31.1 (L) 06/10/2024    MCV 90 06/10/2024     06/10/2024     Lab Results   Component Value Date     06/10/2024    K 4.1 06/10/2024     06/10/2024    CO2 26 06/10/2024    BUN 22 06/10/2024    CREATININE 0.9 06/10/2024    ALBUMIN 3.8 06/10/2024    BILITOT 0.3 06/10/2024    ALKPHOS 55 06/10/2024    AST 12 06/10/2024    ALT 11 06/10/2024        Assessment and Plan:     Problem List Items Addressed This Visit       Multiple myeloma, remission status unspecified  - high risk MM with 17p deletion  - 3  year 3 months s/p Auto SCT  - Started maintenance q2w velcade 5/6/20   - Serial biochemical relapse noted and given this and high risk CG, transitioned to Sravanthi/Noble (1mg/m2)/dex Salvage July 2021    - Due to uncontrolled hyperglycemia dex stopped after C4.    - Supportive meds: continue ppx acyclovir, VTE ppx with asa; resume maintenance zometa q 3 months;  she is past due to for next dose; will schedule with next sravanthi injection   - She demonstrated mild biochemical progression over Summer 2022, with no CRAB, in light of this and high risk CG we transitioned her therapy from Sravanthi/Velcade to Sravanthi/pomalyst; intolerant to dex.   - Pomalyst dose decreased to to 2mg (5/19/23) due to persistent neutropenia now improved   - No CRAB criteria noted today; myeloma labs still pending  - Continue PRN imodium for occasional loose stool with pomalyst   - VTE prophylaxis - Continue ASA 81mg  - Continue q4 week labs/sravanthi and q8 week MD appt      History of auto stem cell transplant  - AUTO SCT on 2/12/20  - Post-transplant vaccines complete  - Now 4 years, 3 months post-transplant      Dementia, unspecified dementia severity, unspecified dementia type,  unspecified whether behavioral, psychotic, or mood disturbance or anxiety  - Worsened but still oriented to person but disoriented to time   - Mgmt per neurology and pcp   - Discussed referral and consideration for nursing home/memory care placement but per friend  declined  - Discussion deferred to PCP/neurology for placement  - They desire to continue cancer therapy for now   - Continue follow up with neuro/psych; defer medication mgmt to them  - Now with home health to assist with management of some issues      Anemia in neoplastic disease  - Secondary to chemotherapy/immunotherapy and JESSE  - Anemia worsened with HGB 9.1 today but stable  - Completed venofer course dec 2023; jan 2024 iron studies normal  - Monitor iron studie q 4  months for repeat iron needs; ferritin normal today  - Check iron studies in 3-4 months      Chemotherapy-induced peripheral neuropathy  - Still bothersome but stable  - Continue Gabapentin         Leukopenia due to antineoplastic chemotherapy  - Secondary to continued immunotherapy/chemotherapy  - Stable to continue treatment      Immunodeficiency due to drugs  - Secondary to continued immunotherapy/chemotherapy  - Continue Acyclovir 400 mg twice/day      Follow Up:   - Sravanthi injection today, next on 7/8, 8/5  - Monthly: cbc, cmp, serum free light chains, quantitative immunoglobulins, serum electropheresis, serum immunofixation  - Next MD appt on 8/5 with labs prior and sravanthi injection after    Total time of this visit was 35 minutes, including time spent face to face with patient and/or via video/audio, and also in preparing for today's visit for MDM and documentation. (Medical Decision Making, including consideration of possible diagnoses, management options, complex medical record review, review of diagnostic tests and information, consideration and discussion of significant complications based on comorbidities, and discussion with providers involved with the care of the  patient). Greater than 50% was spent face to face with the patient counseling and coordinating care.    Thank you for allowing me to partake in the care of this patient. If there are any questions, please do not hesitate to reach out.    Jaimee Hollis, GALA-JUSTYN  Hematologic Malignancies, Stem Cell Transplantation, and Cellular Therapy  [facility]

## 2024-06-10 NOTE — PLAN OF CARE
"  Problem: Fatigue  Goal: Improved Activity Tolerance  Outcome: Progressing  Intervention: Promote Improved Energy  Flowsheets (Taken 6/10/2024 1018)  Fatigue Management:   activity schedule adjusted   activity assistance provided   fatigue-related activity identified   frequent rest breaks encouraged   paced activity encouraged  Sleep/Rest Enhancement:   awakenings minimized   consistent schedule promoted   family presence promoted   natural light exposure provided   noise level reduced   reading promoted   regular sleep/rest pattern promoted   relaxation techniques promoted  Activity Management:   Ambulated -L4   Up in chair - L3  Environmental Support:   calm environment promoted   caregiver consistency promoted   comfort object encouraged   distractions minimized   environmental consistency promoted   personal routine supported   rest periods encouraged  /60 (Patient Position: Sitting)   Pulse (!) 51   Temp 97.9 °F (36.6 °C)   Resp (!) 8   Ht 5' 2" (1.575 m)   Wt 60.9 kg (134 lb 2.4 oz)   LMP  (LMP Unknown)   SpO2 96%   BMI 24.54 kg/m²  Pleasant, alert and oriented patient to Chemo Infusion per self with daughter for C35 Darzalex SQ injection to abdomin - VSS and Darzalex administered over 3-5 min, no AVE's, band-aide applied and patient discharged to home with no concerns - RTC on 7/10/24          "

## 2024-06-10 NOTE — Clinical Note
- Sravanthi injection next on 7/8, 8/5 - Monthly: cbc, cmp, serum free light chains, quantitative immunoglobulins, serum electropheresis, serum immunofixation - Next MD appt on 8/5 with labs prior and sravanthi injection after

## 2024-06-11 LAB
ALBUMIN SERPL ELPH-MCNC: 3.78 G/DL (ref 3.35–5.55)
ALPHA1 GLOB SERPL ELPH-MCNC: 0.25 G/DL (ref 0.17–0.41)
ALPHA2 GLOB SERPL ELPH-MCNC: 0.66 G/DL (ref 0.43–0.99)
B-GLOBULIN SERPL ELPH-MCNC: 0.66 G/DL (ref 0.5–1.1)
GAMMA GLOB SERPL ELPH-MCNC: 0.95 G/DL (ref 0.67–1.58)
KAPPA LC SER QL IA: 12.72 MG/DL (ref 0.33–1.94)
KAPPA LC/LAMBDA SER IA: 6.77 (ref 0.26–1.65)
LAMBDA LC SER QL IA: 1.88 MG/DL (ref 0.57–2.63)
PROT SERPL-MCNC: 6.3 G/DL (ref 6–8.4)

## 2024-06-13 LAB — PATHOLOGIST INTERPRETATION SPE: NORMAL

## 2024-06-30 DIAGNOSIS — T50.905A DRUG-INDUCED CYTOPENIA: ICD-10-CM

## 2024-06-30 DIAGNOSIS — C90.00 MULTIPLE MYELOMA, REMISSION STATUS UNSPECIFIED: ICD-10-CM

## 2024-06-30 DIAGNOSIS — D75.9 DRUG-INDUCED CYTOPENIA: ICD-10-CM

## 2024-07-02 DIAGNOSIS — T50.905A DRUG-INDUCED CYTOPENIA: ICD-10-CM

## 2024-07-02 DIAGNOSIS — C90.00 MULTIPLE MYELOMA, REMISSION STATUS UNSPECIFIED: ICD-10-CM

## 2024-07-02 DIAGNOSIS — D75.9 DRUG-INDUCED CYTOPENIA: ICD-10-CM

## 2024-07-08 ENCOUNTER — LAB VISIT (OUTPATIENT)
Dept: LAB | Facility: HOSPITAL | Age: 74
End: 2024-07-08
Attending: INTERNAL MEDICINE
Payer: MEDICARE

## 2024-07-08 ENCOUNTER — INFUSION (OUTPATIENT)
Dept: INFUSION THERAPY | Facility: HOSPITAL | Age: 74
End: 2024-07-08
Payer: MEDICARE

## 2024-07-08 VITALS
HEIGHT: 62 IN | BODY MASS INDEX: 24.38 KG/M2 | WEIGHT: 132.5 LBS | OXYGEN SATURATION: 95 % | HEART RATE: 61 BPM | RESPIRATION RATE: 16 BRPM | SYSTOLIC BLOOD PRESSURE: 119 MMHG | DIASTOLIC BLOOD PRESSURE: 64 MMHG | TEMPERATURE: 98 F

## 2024-07-08 DIAGNOSIS — C90.00 MULTIPLE MYELOMA, REMISSION STATUS UNSPECIFIED: ICD-10-CM

## 2024-07-08 DIAGNOSIS — Z94.84 HISTORY OF AUTO STEM CELL TRANSPLANT: ICD-10-CM

## 2024-07-08 DIAGNOSIS — C90.02 MULTIPLE MYELOMA IN RELAPSE: Primary | ICD-10-CM

## 2024-07-08 LAB
ALBUMIN SERPL BCP-MCNC: 4 G/DL (ref 3.5–5.2)
ALP SERPL-CCNC: 44 U/L (ref 55–135)
ALT SERPL W/O P-5'-P-CCNC: 10 U/L (ref 10–44)
ANION GAP SERPL CALC-SCNC: 11 MMOL/L (ref 8–16)
AST SERPL-CCNC: 11 U/L (ref 10–40)
BASOPHILS # BLD AUTO: 0.06 K/UL (ref 0–0.2)
BASOPHILS NFR BLD: 2.3 % (ref 0–1.9)
BILIRUB SERPL-MCNC: 0.4 MG/DL (ref 0.1–1)
BUN SERPL-MCNC: 13 MG/DL (ref 8–23)
CALCIUM SERPL-MCNC: 9.5 MG/DL (ref 8.7–10.5)
CHLORIDE SERPL-SCNC: 105 MMOL/L (ref 95–110)
CO2 SERPL-SCNC: 25 MMOL/L (ref 23–29)
CREAT SERPL-MCNC: 0.9 MG/DL (ref 0.5–1.4)
DIFFERENTIAL METHOD BLD: ABNORMAL
EOSINOPHIL # BLD AUTO: 0 K/UL (ref 0–0.5)
EOSINOPHIL NFR BLD: 1.5 % (ref 0–8)
ERYTHROCYTE [DISTWIDTH] IN BLOOD BY AUTOMATED COUNT: 16.5 % (ref 11.5–14.5)
EST. GFR  (NO RACE VARIABLE): >60 ML/MIN/1.73 M^2
GLUCOSE SERPL-MCNC: 78 MG/DL (ref 70–110)
HCT VFR BLD AUTO: 36 % (ref 37–48.5)
HGB BLD-MCNC: 10.6 G/DL (ref 12–16)
IGA SERPL-MCNC: 106 MG/DL (ref 40–350)
IGG SERPL-MCNC: 1180 MG/DL (ref 650–1600)
IGM SERPL-MCNC: 12 MG/DL (ref 50–300)
IMM GRANULOCYTES # BLD AUTO: 0 K/UL (ref 0–0.04)
IMM GRANULOCYTES NFR BLD AUTO: 0 % (ref 0–0.5)
LYMPHOCYTES # BLD AUTO: 1 K/UL (ref 1–4.8)
LYMPHOCYTES NFR BLD: 39 % (ref 18–48)
MCH RBC QN AUTO: 25.7 PG (ref 27–31)
MCHC RBC AUTO-ENTMCNC: 29.4 G/DL (ref 32–36)
MCV RBC AUTO: 87 FL (ref 82–98)
MONOCYTES # BLD AUTO: 0.4 K/UL (ref 0.3–1)
MONOCYTES NFR BLD: 15.2 % (ref 4–15)
NEUTROPHILS # BLD AUTO: 1.1 K/UL (ref 1.8–7.7)
NEUTROPHILS NFR BLD: 42 % (ref 38–73)
NRBC BLD-RTO: 0 /100 WBC
PLATELET # BLD AUTO: 157 K/UL (ref 150–450)
PMV BLD AUTO: 10.6 FL (ref 9.2–12.9)
POTASSIUM SERPL-SCNC: 4.1 MMOL/L (ref 3.5–5.1)
PROT SERPL-MCNC: 7.1 G/DL (ref 6–8.4)
RBC # BLD AUTO: 4.12 M/UL (ref 4–5.4)
SODIUM SERPL-SCNC: 141 MMOL/L (ref 136–145)
WBC # BLD AUTO: 2.64 K/UL (ref 3.9–12.7)

## 2024-07-08 PROCEDURE — 63600175 PHARM REV CODE 636 W HCPCS: Mod: JZ,JG,HCNC | Performed by: INTERNAL MEDICINE

## 2024-07-08 PROCEDURE — 84165 PROTEIN E-PHORESIS SERUM: CPT | Mod: 26,HCNC,, | Performed by: PATHOLOGY

## 2024-07-08 PROCEDURE — 36415 COLL VENOUS BLD VENIPUNCTURE: CPT | Mod: HCNC | Performed by: INTERNAL MEDICINE

## 2024-07-08 PROCEDURE — 80053 COMPREHEN METABOLIC PANEL: CPT | Mod: HCNC | Performed by: INTERNAL MEDICINE

## 2024-07-08 PROCEDURE — 96401 CHEMO ANTI-NEOPL SQ/IM: CPT | Mod: HCNC

## 2024-07-08 PROCEDURE — 83521 IG LIGHT CHAINS FREE EACH: CPT | Mod: HCNC | Performed by: INTERNAL MEDICINE

## 2024-07-08 PROCEDURE — 84165 PROTEIN E-PHORESIS SERUM: CPT | Mod: HCNC | Performed by: INTERNAL MEDICINE

## 2024-07-08 PROCEDURE — 85025 COMPLETE CBC W/AUTO DIFF WBC: CPT | Mod: HCNC | Performed by: INTERNAL MEDICINE

## 2024-07-08 PROCEDURE — 82784 ASSAY IGA/IGD/IGG/IGM EACH: CPT | Mod: HCNC | Performed by: INTERNAL MEDICINE

## 2024-07-08 RX ORDER — EPINEPHRINE 0.3 MG/.3ML
0.3 INJECTION SUBCUTANEOUS ONCE AS NEEDED
Status: DISCONTINUED | OUTPATIENT
Start: 2024-07-08 | End: 2024-07-08 | Stop reason: HOSPADM

## 2024-07-08 RX ORDER — DIPHENHYDRAMINE HYDROCHLORIDE 50 MG/ML
50 INJECTION INTRAMUSCULAR; INTRAVENOUS ONCE AS NEEDED
Status: DISCONTINUED | OUTPATIENT
Start: 2024-07-08 | End: 2024-07-08 | Stop reason: HOSPADM

## 2024-07-08 RX ADMIN — DARATUMUMAB AND HYALURONIDASE-FIHJ (HUMAN RECOMBINANT) 1800 MG: 1800; 30000 INJECTION SUBCUTANEOUS at 12:07

## 2024-07-09 LAB
ALBUMIN SERPL ELPH-MCNC: 3.99 G/DL (ref 3.35–5.55)
ALPHA1 GLOB SERPL ELPH-MCNC: 0.26 G/DL (ref 0.17–0.41)
ALPHA2 GLOB SERPL ELPH-MCNC: 0.69 G/DL (ref 0.43–0.99)
B-GLOBULIN SERPL ELPH-MCNC: 0.71 G/DL (ref 0.5–1.1)
GAMMA GLOB SERPL ELPH-MCNC: 1.05 G/DL (ref 0.67–1.58)
KAPPA LC SER QL IA: 12.13 MG/DL (ref 0.33–1.94)
KAPPA LC/LAMBDA SER IA: 7.22 (ref 0.26–1.65)
LAMBDA LC SER QL IA: 1.68 MG/DL (ref 0.57–2.63)
PATHOLOGIST INTERPRETATION SPE: NORMAL
PROT SERPL-MCNC: 6.7 G/DL (ref 6–8.4)

## 2024-07-31 DIAGNOSIS — D75.9 DRUG-INDUCED CYTOPENIA: ICD-10-CM

## 2024-07-31 DIAGNOSIS — T50.905A DRUG-INDUCED CYTOPENIA: ICD-10-CM

## 2024-07-31 DIAGNOSIS — C90.00 MULTIPLE MYELOMA, REMISSION STATUS UNSPECIFIED: ICD-10-CM

## 2024-08-01 DIAGNOSIS — D75.9 DRUG-INDUCED CYTOPENIA: ICD-10-CM

## 2024-08-01 DIAGNOSIS — T50.905A DRUG-INDUCED CYTOPENIA: ICD-10-CM

## 2024-08-01 DIAGNOSIS — C90.00 MULTIPLE MYELOMA, REMISSION STATUS UNSPECIFIED: ICD-10-CM

## 2024-08-02 DIAGNOSIS — E87.8 ELECTROLYTE ABNORMALITY: ICD-10-CM

## 2024-08-02 RX ORDER — LANOLIN ALCOHOL/MO/W.PET/CERES
2 CREAM (GRAM) TOPICAL 2 TIMES DAILY
Qty: 360 TABLET | Refills: 1 | Status: SHIPPED | OUTPATIENT
Start: 2024-08-02

## 2024-08-05 ENCOUNTER — OFFICE VISIT (OUTPATIENT)
Dept: HEMATOLOGY/ONCOLOGY | Facility: CLINIC | Age: 74
End: 2024-08-05
Payer: MEDICARE

## 2024-08-05 ENCOUNTER — INFUSION (OUTPATIENT)
Dept: INFUSION THERAPY | Facility: HOSPITAL | Age: 74
End: 2024-08-05
Payer: MEDICARE

## 2024-08-05 ENCOUNTER — LAB VISIT (OUTPATIENT)
Dept: LAB | Facility: HOSPITAL | Age: 74
End: 2024-08-05
Attending: INTERNAL MEDICINE
Payer: MEDICARE

## 2024-08-05 VITALS
OXYGEN SATURATION: 96 % | SYSTOLIC BLOOD PRESSURE: 123 MMHG | TEMPERATURE: 98 F | RESPIRATION RATE: 18 BRPM | DIASTOLIC BLOOD PRESSURE: 62 MMHG | HEART RATE: 54 BPM

## 2024-08-05 VITALS
DIASTOLIC BLOOD PRESSURE: 71 MMHG | HEIGHT: 62 IN | BODY MASS INDEX: 24.75 KG/M2 | OXYGEN SATURATION: 96 % | TEMPERATURE: 98 F | RESPIRATION RATE: 18 BRPM | SYSTOLIC BLOOD PRESSURE: 133 MMHG | HEART RATE: 51 BPM | WEIGHT: 134.5 LBS

## 2024-08-05 DIAGNOSIS — T45.1X5A LEUKOPENIA DUE TO ANTINEOPLASTIC CHEMOTHERAPY: ICD-10-CM

## 2024-08-05 DIAGNOSIS — C90.00 MULTIPLE MYELOMA, REMISSION STATUS UNSPECIFIED: ICD-10-CM

## 2024-08-05 DIAGNOSIS — G47.00 INSOMNIA, UNSPECIFIED TYPE: ICD-10-CM

## 2024-08-05 DIAGNOSIS — D70.1 LEUKOPENIA DUE TO ANTINEOPLASTIC CHEMOTHERAPY: ICD-10-CM

## 2024-08-05 DIAGNOSIS — Z79.899 IMMUNODEFICIENCY DUE TO DRUGS: ICD-10-CM

## 2024-08-05 DIAGNOSIS — Z94.84 HISTORY OF AUTO STEM CELL TRANSPLANT: ICD-10-CM

## 2024-08-05 DIAGNOSIS — C90.02 MULTIPLE MYELOMA IN RELAPSE: Primary | ICD-10-CM

## 2024-08-05 DIAGNOSIS — D84.821 IMMUNODEFICIENCY DUE TO DRUGS: ICD-10-CM

## 2024-08-05 DIAGNOSIS — D63.0 ANEMIA IN NEOPLASTIC DISEASE: ICD-10-CM

## 2024-08-05 DIAGNOSIS — R00.1 BRADYCARDIA: ICD-10-CM

## 2024-08-05 DIAGNOSIS — C90.00 MULTIPLE MYELOMA, REMISSION STATUS UNSPECIFIED: Primary | ICD-10-CM

## 2024-08-05 DIAGNOSIS — F03.90 DEMENTIA, UNSPECIFIED DEMENTIA SEVERITY, UNSPECIFIED DEMENTIA TYPE, UNSPECIFIED WHETHER BEHAVIORAL, PSYCHOTIC, OR MOOD DISTURBANCE OR ANXIETY: ICD-10-CM

## 2024-08-05 LAB
ALBUMIN SERPL BCP-MCNC: 3.7 G/DL (ref 3.5–5.2)
ALP SERPL-CCNC: 53 U/L (ref 55–135)
ALT SERPL W/O P-5'-P-CCNC: 9 U/L (ref 10–44)
ANION GAP SERPL CALC-SCNC: 8 MMOL/L (ref 8–16)
AST SERPL-CCNC: 10 U/L (ref 10–40)
BASOPHILS # BLD AUTO: 0.04 K/UL (ref 0–0.2)
BASOPHILS NFR BLD: 1.4 % (ref 0–1.9)
BILIRUB SERPL-MCNC: 0.2 MG/DL (ref 0.1–1)
BUN SERPL-MCNC: 16 MG/DL (ref 8–23)
CALCIUM SERPL-MCNC: 9.9 MG/DL (ref 8.7–10.5)
CHLORIDE SERPL-SCNC: 107 MMOL/L (ref 95–110)
CO2 SERPL-SCNC: 28 MMOL/L (ref 23–29)
CREAT SERPL-MCNC: 0.8 MG/DL (ref 0.5–1.4)
DIFFERENTIAL METHOD BLD: ABNORMAL
EOSINOPHIL # BLD AUTO: 0.1 K/UL (ref 0–0.5)
EOSINOPHIL NFR BLD: 4.7 % (ref 0–8)
ERYTHROCYTE [DISTWIDTH] IN BLOOD BY AUTOMATED COUNT: 16.1 % (ref 11.5–14.5)
EST. GFR  (NO RACE VARIABLE): >60 ML/MIN/1.73 M^2
GLUCOSE SERPL-MCNC: 99 MG/DL (ref 70–110)
HCT VFR BLD AUTO: 35 % (ref 37–48.5)
HGB BLD-MCNC: 10.1 G/DL (ref 12–16)
IGA SERPL-MCNC: 104 MG/DL (ref 40–350)
IGG SERPL-MCNC: 1143 MG/DL (ref 650–1600)
IGM SERPL-MCNC: 13 MG/DL (ref 50–300)
IMM GRANULOCYTES # BLD AUTO: 0.02 K/UL (ref 0–0.04)
IMM GRANULOCYTES NFR BLD AUTO: 0.7 % (ref 0–0.5)
LYMPHOCYTES # BLD AUTO: 0.9 K/UL (ref 1–4.8)
LYMPHOCYTES NFR BLD: 31.4 % (ref 18–48)
MCH RBC QN AUTO: 24.6 PG (ref 27–31)
MCHC RBC AUTO-ENTMCNC: 28.9 G/DL (ref 32–36)
MCV RBC AUTO: 85 FL (ref 82–98)
MONOCYTES # BLD AUTO: 0.5 K/UL (ref 0.3–1)
MONOCYTES NFR BLD: 16.6 % (ref 4–15)
NEUTROPHILS # BLD AUTO: 1.3 K/UL (ref 1.8–7.7)
NEUTROPHILS NFR BLD: 45.2 % (ref 38–73)
NRBC BLD-RTO: 0 /100 WBC
PLATELET # BLD AUTO: 160 K/UL (ref 150–450)
PMV BLD AUTO: 11.2 FL (ref 9.2–12.9)
POTASSIUM SERPL-SCNC: 4.2 MMOL/L (ref 3.5–5.1)
PROT SERPL-MCNC: 6.9 G/DL (ref 6–8.4)
RBC # BLD AUTO: 4.11 M/UL (ref 4–5.4)
SODIUM SERPL-SCNC: 143 MMOL/L (ref 136–145)
WBC # BLD AUTO: 2.77 K/UL (ref 3.9–12.7)

## 2024-08-05 PROCEDURE — 36415 COLL VENOUS BLD VENIPUNCTURE: CPT | Mod: HCNC | Performed by: INTERNAL MEDICINE

## 2024-08-05 PROCEDURE — 1101F PT FALLS ASSESS-DOCD LE1/YR: CPT | Mod: HCNC,CPTII,S$GLB,

## 2024-08-05 PROCEDURE — 3288F FALL RISK ASSESSMENT DOCD: CPT | Mod: HCNC,CPTII,S$GLB,

## 2024-08-05 PROCEDURE — 3072F LOW RISK FOR RETINOPATHY: CPT | Mod: HCNC,CPTII,S$GLB,

## 2024-08-05 PROCEDURE — 84165 PROTEIN E-PHORESIS SERUM: CPT | Mod: HCNC | Performed by: INTERNAL MEDICINE

## 2024-08-05 PROCEDURE — 3078F DIAST BP <80 MM HG: CPT | Mod: HCNC,CPTII,S$GLB,

## 2024-08-05 PROCEDURE — 3044F HG A1C LEVEL LT 7.0%: CPT | Mod: HCNC,CPTII,S$GLB,

## 2024-08-05 PROCEDURE — 85025 COMPLETE CBC W/AUTO DIFF WBC: CPT | Mod: HCNC | Performed by: INTERNAL MEDICINE

## 2024-08-05 PROCEDURE — 80053 COMPREHEN METABOLIC PANEL: CPT | Mod: HCNC | Performed by: INTERNAL MEDICINE

## 2024-08-05 PROCEDURE — 96401 CHEMO ANTI-NEOPL SQ/IM: CPT | Mod: HCNC

## 2024-08-05 PROCEDURE — 84165 PROTEIN E-PHORESIS SERUM: CPT | Mod: 26,HCNC,, | Performed by: PATHOLOGY

## 2024-08-05 PROCEDURE — 4010F ACE/ARB THERAPY RXD/TAKEN: CPT | Mod: HCNC,CPTII,S$GLB,

## 2024-08-05 PROCEDURE — 3066F NEPHROPATHY DOC TX: CPT | Mod: HCNC,CPTII,S$GLB,

## 2024-08-05 PROCEDURE — 83521 IG LIGHT CHAINS FREE EACH: CPT | Mod: 59,HCNC | Performed by: INTERNAL MEDICINE

## 2024-08-05 PROCEDURE — 63600175 PHARM REV CODE 636 W HCPCS: Mod: JZ,JG,HCNC | Performed by: INTERNAL MEDICINE

## 2024-08-05 PROCEDURE — 99215 OFFICE O/P EST HI 40 MIN: CPT | Mod: HCNC,S$GLB,,

## 2024-08-05 PROCEDURE — 82784 ASSAY IGA/IGD/IGG/IGM EACH: CPT | Mod: 59,HCNC | Performed by: INTERNAL MEDICINE

## 2024-08-05 PROCEDURE — 1159F MED LIST DOCD IN RCRD: CPT | Mod: HCNC,CPTII,S$GLB,

## 2024-08-05 PROCEDURE — 3075F SYST BP GE 130 - 139MM HG: CPT | Mod: HCNC,CPTII,S$GLB,

## 2024-08-05 PROCEDURE — 99999 PR PBB SHADOW E&M-EST. PATIENT-LVL III: CPT | Mod: PBBFAC,HCNC,,

## 2024-08-05 PROCEDURE — 1126F AMNT PAIN NOTED NONE PRSNT: CPT | Mod: HCNC,CPTII,S$GLB,

## 2024-08-05 PROCEDURE — 1160F RVW MEDS BY RX/DR IN RCRD: CPT | Mod: HCNC,CPTII,S$GLB,

## 2024-08-05 PROCEDURE — 3061F NEG MICROALBUMINURIA REV: CPT | Mod: HCNC,CPTII,S$GLB,

## 2024-08-05 RX ORDER — EPINEPHRINE 0.3 MG/.3ML
0.3 INJECTION SUBCUTANEOUS ONCE AS NEEDED
Status: DISCONTINUED | OUTPATIENT
Start: 2024-08-05 | End: 2024-08-05 | Stop reason: HOSPADM

## 2024-08-05 RX ORDER — DIPHENHYDRAMINE HYDROCHLORIDE 50 MG/ML
50 INJECTION INTRAMUSCULAR; INTRAVENOUS ONCE AS NEEDED
Status: DISCONTINUED | OUTPATIENT
Start: 2024-08-05 | End: 2024-08-05 | Stop reason: HOSPADM

## 2024-08-05 RX ORDER — DIPHENHYDRAMINE HYDROCHLORIDE 50 MG/ML
50 INJECTION INTRAMUSCULAR; INTRAVENOUS ONCE AS NEEDED
Status: CANCELLED | OUTPATIENT
Start: 2024-08-07

## 2024-08-05 RX ORDER — EPINEPHRINE 0.3 MG/.3ML
0.3 INJECTION SUBCUTANEOUS ONCE AS NEEDED
Status: CANCELLED | OUTPATIENT
Start: 2024-08-07

## 2024-08-05 RX ADMIN — DARATUMUMAB AND HYALURONIDASE-FIHJ (HUMAN RECOMBINANT) 1800 MG: 1800; 30000 INJECTION SUBCUTANEOUS at 10:08

## 2024-08-06 LAB
ALBUMIN SERPL ELPH-MCNC: 3.81 G/DL (ref 3.35–5.55)
ALPHA1 GLOB SERPL ELPH-MCNC: 0.29 G/DL (ref 0.17–0.41)
ALPHA2 GLOB SERPL ELPH-MCNC: 0.75 G/DL (ref 0.43–0.99)
B-GLOBULIN SERPL ELPH-MCNC: 0.71 G/DL (ref 0.5–1.1)
GAMMA GLOB SERPL ELPH-MCNC: 1.05 G/DL (ref 0.67–1.58)
KAPPA LC SER QL IA: 12.04 MG/DL (ref 0.33–1.94)
KAPPA LC/LAMBDA SER IA: 6.17 (ref 0.26–1.65)
LAMBDA LC SER QL IA: 1.95 MG/DL (ref 0.57–2.63)
PATHOLOGIST INTERPRETATION SPE: NORMAL
PROT SERPL-MCNC: 6.6 G/DL (ref 6–8.4)

## 2024-08-13 ENCOUNTER — OFFICE VISIT (OUTPATIENT)
Dept: OTOLARYNGOLOGY | Facility: CLINIC | Age: 74
End: 2024-08-13
Payer: MEDICARE

## 2024-08-13 VITALS
SYSTOLIC BLOOD PRESSURE: 129 MMHG | BODY MASS INDEX: 24.03 KG/M2 | HEART RATE: 76 BPM | DIASTOLIC BLOOD PRESSURE: 86 MMHG | WEIGHT: 131.38 LBS

## 2024-08-13 DIAGNOSIS — T16.1XXA EAR FOREIGN BODY, RIGHT, INITIAL ENCOUNTER: ICD-10-CM

## 2024-08-13 DIAGNOSIS — J32.9 CHRONIC SINUSITIS, UNSPECIFIED LOCATION: Primary | ICD-10-CM

## 2024-08-13 PROCEDURE — 3079F DIAST BP 80-89 MM HG: CPT | Mod: HCNC,CPTII,S$GLB, | Performed by: STUDENT IN AN ORGANIZED HEALTH CARE EDUCATION/TRAINING PROGRAM

## 2024-08-13 PROCEDURE — 99999 PR PBB SHADOW E&M-EST. PATIENT-LVL III: CPT | Mod: PBBFAC,HCNC,, | Performed by: STUDENT IN AN ORGANIZED HEALTH CARE EDUCATION/TRAINING PROGRAM

## 2024-08-13 PROCEDURE — 1159F MED LIST DOCD IN RCRD: CPT | Mod: HCNC,CPTII,S$GLB, | Performed by: STUDENT IN AN ORGANIZED HEALTH CARE EDUCATION/TRAINING PROGRAM

## 2024-08-13 PROCEDURE — 1126F AMNT PAIN NOTED NONE PRSNT: CPT | Mod: HCNC,CPTII,S$GLB, | Performed by: STUDENT IN AN ORGANIZED HEALTH CARE EDUCATION/TRAINING PROGRAM

## 2024-08-13 PROCEDURE — 3044F HG A1C LEVEL LT 7.0%: CPT | Mod: HCNC,CPTII,S$GLB, | Performed by: STUDENT IN AN ORGANIZED HEALTH CARE EDUCATION/TRAINING PROGRAM

## 2024-08-13 PROCEDURE — 3074F SYST BP LT 130 MM HG: CPT | Mod: HCNC,CPTII,S$GLB, | Performed by: STUDENT IN AN ORGANIZED HEALTH CARE EDUCATION/TRAINING PROGRAM

## 2024-08-13 PROCEDURE — 69200 CLEAR OUTER EAR CANAL: CPT | Mod: 51,HCNC,RT,S$GLB | Performed by: STUDENT IN AN ORGANIZED HEALTH CARE EDUCATION/TRAINING PROGRAM

## 2024-08-13 PROCEDURE — 99213 OFFICE O/P EST LOW 20 MIN: CPT | Mod: 25,HCNC,S$GLB, | Performed by: STUDENT IN AN ORGANIZED HEALTH CARE EDUCATION/TRAINING PROGRAM

## 2024-08-13 PROCEDURE — 4010F ACE/ARB THERAPY RXD/TAKEN: CPT | Mod: HCNC,CPTII,S$GLB, | Performed by: STUDENT IN AN ORGANIZED HEALTH CARE EDUCATION/TRAINING PROGRAM

## 2024-08-13 PROCEDURE — 3066F NEPHROPATHY DOC TX: CPT | Mod: HCNC,CPTII,S$GLB, | Performed by: STUDENT IN AN ORGANIZED HEALTH CARE EDUCATION/TRAINING PROGRAM

## 2024-08-13 PROCEDURE — 31231 NASAL ENDOSCOPY DX: CPT | Mod: HCNC,S$GLB,, | Performed by: STUDENT IN AN ORGANIZED HEALTH CARE EDUCATION/TRAINING PROGRAM

## 2024-08-13 PROCEDURE — 3061F NEG MICROALBUMINURIA REV: CPT | Mod: HCNC,CPTII,S$GLB, | Performed by: STUDENT IN AN ORGANIZED HEALTH CARE EDUCATION/TRAINING PROGRAM

## 2024-08-13 PROCEDURE — 3072F LOW RISK FOR RETINOPATHY: CPT | Mod: HCNC,CPTII,S$GLB, | Performed by: STUDENT IN AN ORGANIZED HEALTH CARE EDUCATION/TRAINING PROGRAM

## 2024-08-13 RX ORDER — FLUTICASONE PROPIONATE 50 MCG
1 SPRAY, SUSPENSION (ML) NASAL DAILY
Qty: 15.8 ML | Refills: 3 | Status: SHIPPED | OUTPATIENT
Start: 2024-08-13

## 2024-08-13 NOTE — PROGRESS NOTES
Subjective:      Shelby is a 74 y.o. female who comes for follow-up of sinusitis.  Her last visit with me was on 2/19/2024.  She reports some intermittent drainage from her nose.  Denies any facial pressure or headaches.  Does have some fullness in her right ear has been cleaned the area with cotton tips.      Her current sinus regime consists of: None.     The assessment of quality and severity of symptoms as measured by the SNOT-22 score is deferred.     The patient's medications, allergies, past medical, surgical, social and family histories were reviewed and updated as appropriate.    ROS     A detailed review of systems was obtained with pertinent positives as per the above HPI, and otherwise negative.        Objective:     /86 (BP Location: Left arm, Patient Position: Sitting, BP Method: Large (Automatic))   Pulse 76   Wt 59.6 kg (131 lb 6.3 oz)   LMP  (LMP Unknown)   BMI 24.03 kg/m²          Ears:    Ears:        Procedure    Nasal endoscopy performed.  See procedure note.    Nasal Endoscopy:  8/13/2024    The use of diagnostic nasal endoscopy was considered medically necessary for the evaluation and visualization of the nasal anatomy for symptoms suggestive of nasal or sinus origin. Physical examination (including a nasal speculum evaluation) did not provide sufficient clinical information to establish a diagnosis, or symptoms did not improve or worsened following treatment.     The nasal cavity was decongested with topical 1% phenylephrine and anesthetized with 4% lidocaine.  A rigid 0-degree endoscope was introduced into the nasal cavity.    The patient was seated in the examination chair. After discussion of risks and benefits, a nasal endoscope was inserted into the nose the endoscope was passed along the left nasal floor to the nasopharynx. It was then passed between the middle and superior meatus, nasal turbinates, nasal septum, nasopharynx and sphenoethmoid region. The nasal endoscope  "was withdrawn and there was no complications. An identical procedure was performed on the right side. I was present for the entire procedure.The patient tolerated the above procedure well. The findings of this procedure can be found in the dictated note from 8/13/2024 visit.      Right EAC Foreign Body Removal    Indication: Right EAC foreign body    A foreign body was present in the right and lateral ear canal that was identified with direct observation.The cotton swab was completely removed under direct visualization using an angled hook.  I performed the procedure myself.  There was no significant trauma.    The patient tolerated the procedure well.    Data Reviewed    WBC (K/uL)   Date Value   08/05/2024 2.77 (L)     Eosinophil % (%)   Date Value   08/05/2024 4.7     Eos # (K/uL)   Date Value   08/05/2024 0.1     Platelets (K/uL)   Date Value   08/05/2024 160     Glucose (mg/dL)   Date Value   08/05/2024 99     No results found for: "IGE"    No sinus imaging available.      Assessment:     1. Chronic sinusitis, unspecified location    2. Ear foreign body, right, initial encounter         Plan:     - FB right EAC removed in clinic  - Start flonase  - RTC 6 months    Tico Florentino MD     "

## 2024-08-19 ENCOUNTER — OFFICE VISIT (OUTPATIENT)
Dept: INTERNAL MEDICINE | Facility: CLINIC | Age: 74
End: 2024-08-19
Payer: MEDICARE

## 2024-08-19 VITALS
SYSTOLIC BLOOD PRESSURE: 114 MMHG | OXYGEN SATURATION: 97 % | HEIGHT: 62 IN | BODY MASS INDEX: 24.42 KG/M2 | HEART RATE: 56 BPM | DIASTOLIC BLOOD PRESSURE: 72 MMHG | WEIGHT: 132.69 LBS

## 2024-08-19 DIAGNOSIS — E11.51 TYPE 2 DIABETES MELLITUS WITH DIABETIC PERIPHERAL ANGIOPATHY WITHOUT GANGRENE, WITH LONG-TERM CURRENT USE OF INSULIN: ICD-10-CM

## 2024-08-19 DIAGNOSIS — J44.9 CHRONIC OBSTRUCTIVE PULMONARY DISEASE, UNSPECIFIED COPD TYPE: ICD-10-CM

## 2024-08-19 DIAGNOSIS — Z78.9 STATIN INTOLERANCE: ICD-10-CM

## 2024-08-19 DIAGNOSIS — E11.69 HYPERLIPIDEMIA ASSOCIATED WITH TYPE 2 DIABETES MELLITUS: ICD-10-CM

## 2024-08-19 DIAGNOSIS — E87.8 ELECTROLYTE ABNORMALITY: ICD-10-CM

## 2024-08-19 DIAGNOSIS — I73.9 PAD (PERIPHERAL ARTERY DISEASE): ICD-10-CM

## 2024-08-19 DIAGNOSIS — E78.5 HYPERLIPIDEMIA ASSOCIATED WITH TYPE 2 DIABETES MELLITUS: ICD-10-CM

## 2024-08-19 DIAGNOSIS — F03.90 DEMENTIA, UNSPECIFIED DEMENTIA SEVERITY, UNSPECIFIED DEMENTIA TYPE, UNSPECIFIED WHETHER BEHAVIORAL, PSYCHOTIC, OR MOOD DISTURBANCE OR ANXIETY: ICD-10-CM

## 2024-08-19 DIAGNOSIS — C90.01 MULTIPLE MYELOMA IN REMISSION: ICD-10-CM

## 2024-08-19 DIAGNOSIS — I69.351 HEMIPLEGIA AND HEMIPARESIS FOLLOWING CEREBRAL INFARCTION AFFECTING RIGHT DOMINANT SIDE: ICD-10-CM

## 2024-08-19 DIAGNOSIS — Z79.4 TYPE 2 DIABETES MELLITUS WITH DIABETIC PERIPHERAL ANGIOPATHY WITHOUT GANGRENE, WITH LONG-TERM CURRENT USE OF INSULIN: ICD-10-CM

## 2024-08-19 DIAGNOSIS — C90.00 METASTATIC MULTIPLE MYELOMA TO BONE: Primary | ICD-10-CM

## 2024-08-19 DIAGNOSIS — N18.31 STAGE 3A CHRONIC KIDNEY DISEASE: ICD-10-CM

## 2024-08-19 PROCEDURE — 3044F HG A1C LEVEL LT 7.0%: CPT | Mod: HCNC,CPTII,S$GLB, | Performed by: INTERNAL MEDICINE

## 2024-08-19 PROCEDURE — 3074F SYST BP LT 130 MM HG: CPT | Mod: HCNC,CPTII,S$GLB, | Performed by: INTERNAL MEDICINE

## 2024-08-19 PROCEDURE — 4010F ACE/ARB THERAPY RXD/TAKEN: CPT | Mod: HCNC,CPTII,S$GLB, | Performed by: INTERNAL MEDICINE

## 2024-08-19 PROCEDURE — 3066F NEPHROPATHY DOC TX: CPT | Mod: HCNC,CPTII,S$GLB, | Performed by: INTERNAL MEDICINE

## 2024-08-19 PROCEDURE — G2211 COMPLEX E/M VISIT ADD ON: HCPCS | Mod: HCNC,S$GLB,, | Performed by: INTERNAL MEDICINE

## 2024-08-19 PROCEDURE — 3078F DIAST BP <80 MM HG: CPT | Mod: HCNC,CPTII,S$GLB, | Performed by: INTERNAL MEDICINE

## 2024-08-19 PROCEDURE — 3072F LOW RISK FOR RETINOPATHY: CPT | Mod: HCNC,CPTII,S$GLB, | Performed by: INTERNAL MEDICINE

## 2024-08-19 PROCEDURE — 1101F PT FALLS ASSESS-DOCD LE1/YR: CPT | Mod: HCNC,CPTII,S$GLB, | Performed by: INTERNAL MEDICINE

## 2024-08-19 PROCEDURE — 1126F AMNT PAIN NOTED NONE PRSNT: CPT | Mod: HCNC,CPTII,S$GLB, | Performed by: INTERNAL MEDICINE

## 2024-08-19 PROCEDURE — 99999 PR PBB SHADOW E&M-EST. PATIENT-LVL IV: CPT | Mod: PBBFAC,HCNC,, | Performed by: INTERNAL MEDICINE

## 2024-08-19 PROCEDURE — 3288F FALL RISK ASSESSMENT DOCD: CPT | Mod: HCNC,CPTII,S$GLB, | Performed by: INTERNAL MEDICINE

## 2024-08-19 PROCEDURE — 1159F MED LIST DOCD IN RCRD: CPT | Mod: HCNC,CPTII,S$GLB, | Performed by: INTERNAL MEDICINE

## 2024-08-19 PROCEDURE — 99214 OFFICE O/P EST MOD 30 MIN: CPT | Mod: HCNC,S$GLB,, | Performed by: INTERNAL MEDICINE

## 2024-08-19 PROCEDURE — 3061F NEG MICROALBUMINURIA REV: CPT | Mod: HCNC,CPTII,S$GLB, | Performed by: INTERNAL MEDICINE

## 2024-08-19 RX ORDER — POTASSIUM CHLORIDE 20 MEQ/1
20 TABLET, EXTENDED RELEASE ORAL DAILY
Qty: 30 TABLET | Refills: 11 | Status: SHIPPED | OUTPATIENT
Start: 2024-08-19 | End: 2025-08-19

## 2024-08-19 NOTE — PROGRESS NOTES
This note was generated with [employer] voice recognition software. I apologize for any possible typographical errors.        Subjective:       Patient ID:  Shelby is a 74 y.o. female being seen for follow .       Chief Complaint: Follow-up- 4  months.         73 yo F with Dementia coming in today to to follow up with her multiple myeloma (follows with Heme/Onc, s/p stem cell transplant Feb 2020), T2DM, thrombocytopenia, depression, drug induced neuropathy, chronic diastolic heart failure, hx of stroke (one in 2008, one in 2011) with hemiplegia on right side- using cane- not today  , HLD, anemia, colon polyps who presents for follow up. Prior smoker, quit in 2011.  She is accompanied by family member.    Oncologic History: Last seen by Hem/onc 8/5/2024.       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  She achieved and tolerated well VRd x completing 7, 28 day cycles and achieving deep VGPR to induction. Then underwent Melphalan autologous stem cell transplant on 2/12/2020.   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    -Stable   - continues gabapentin and prn tramadol        SHe has diabetes.  Recent hgb A1c 5.4  ( 4/2024) . on metformin.  No retinopathy-- has cataracts       Htn on amlodipine and valsartan  on repatha for Hyperlipidemia due to statin  "intolerance.  -- /72      She has COPD-- has not been using her inhalers.  SUNsure karime she has had to use her  home o2 recently. ( "My Grandmother is giving it to me" ) .  SHe has avoided URI and pneumonias this winter.               Fatigue: less fatigue than before-- doing pretty " good" per her ( she is still getting tired a lot per her friend) ,   Increased appetite from before, eats 3 meals per day, consuming ice cream about 3x/week . Sometimes will eat only 2 meals if she is sleeping.  Weight loss/gain:wt down 3 #   since last visit. ^ #  since the beginning of year.   Things don't taste like they use to.  SHe has ensure but does not drink it all the time.             Wt is down to 132#--         Right hand twitching, chronic.   Still able to cook.  helps with med administration and keeping track of blood sugars.   Elevates feet for leg swelling, uses compression stockings.            HLD with Statin Intolerance: on praluent, PCSK9 inhibitor-- lipid panel form 4/24 reviewed      Dementia--- having hallucinations but less -She is seeing bugs.  See one under my chair-  . .  She is getting confused.  SHe has to have help with bathing, eating, food prep, and other ADL's.   NO SI or HI>  She is suppose to be on zofloft but she forgot this and has not tired this yet.           incidental descending thoracic aortic aneurysm noted; referred to thoracic surgery; seen 8/7/20; per note: "at current size would not recommend any intervention as risk of rupture remains low.  Recommend surveillance CT chest every 12 months to monitor growth of the aneurysm.  Would typically recommend aspirin 81 mg daily in patients with aortic aneurysm   Last cta was 10/2023- due to repeat 10/2024.       Review of Systems   Constitutional: Negative for fever. Weight is stable   .  NO eating much- snaking--  weight atablized.    HENT: Negative for sore throat.    Eyes: Negative for blurred vision.   Respiratory: Negative for " cough and shortness of breath.    Cardiovascular: Positive for leg swelling. Negative for chest pain.   Gastrointestinal: Negative for abdominal pain, constipation, diarrhea, nausea and vomiting.   Genitourinary: Negative for dysuria.   Musculoskeletal: Positive for myalgias.   Neurological: Negative for headaches.   Psychiatric/Behavioral: Positive confusion and she has hallucinations at times.  .      Problem List       Patient Active Problem List   Diagnosis    Personal history of malignant neoplasm of breast    Mononeuritis of upper limb    Hyperlipidemia associated with type 2 diabetes mellitus    Gastropathy    Tremor    Nuclear sclerosis    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    Aneurysm of descending thoracic aorta without rupture    Aortic atherosclerosis    History of CVA with residual deficit    Osteopenia    PAD (peripheral artery disease)    Liver mass    Recurrent major depressive disorder, in full remission    Statin intolerance    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)    Pulmonary heart disease    Ectatic aorta    Tortuous aorta    Budd-Chiari syndrome    PVC (premature ventricular contraction)    Multiple myeloma in relapse    Metastatic multiple myeloma to bone    Status post autologous bone marrow transplant    Abnormal PFT    Infection due to human metapneumovirus (hMPV)    Iron deficiency anemia due to chronic blood loss    History of colon polyps    Drug-induced polyneuropathy    Hypertension associated with diabetes    Multiple myeloma in remission    Anemia in neoplastic disease    Hemiplegia and hemiparesis following cerebral infarction affecting right dominant side    Abnormal findings on diagnostic imaging of liver and biliary tract     Type 2 diabetes mellitus with diabetic peripheral angiopathy without gangrene, with long-term current use of insulin    History of auto stem cell transplant    Chronic obstructive pulmonary disease,  "unspecified COPD type    Mild neurocognitive disorder due to multiple etiologies    Immunocompromised    Mild vascular dementia    Stage 3a chronic kidney disease             Objective:              /72 (BP Location: Right arm, Patient Position: Sitting, BP Method: Medium (Manual))   Pulse (!) 56   Ht 5' 2" (1.575 m)   Wt 60.2 kg (132 lb 11.5 oz)   LMP  (LMP Unknown)   SpO2 97%   BMI 24.27 kg/m²         Constitutional:       Appearance: Normal appearance.      Comments: Pleasant, in good spirits.  Little silly- she is confabulating answers to questions so she is not reliable- repeating questions can give different answers.    HENT:      Head: Normocephalic and atraumatic.      Nose: Nose normal.      Mouth/Throat:      Mouth: Mucous membranes are moist.   Eyes:      General: No scleral icterus.     Conjunctiva/sclera: Conjunctivae normal.   Cardiovascular:      Rate and Rhythm: Normal rate and regular rhythm.      Pulses: Normal pulses.      Heart sounds: Normal heart sounds. No murmur heard.  Pulmonary:      Effort: Pulmonary effort is normal. No respiratory distress.      Breath sounds: Normal breath sounds. No wheezing or rales.   Abdominal:      General: Abdomen is flat. There is no distension.      Palpations: Abdomen is soft.      Tenderness: There is no abdominal tenderness. There is no guarding.   Musculoskeletal:         General: No swelling, tenderness or deformity.      Cervical back: Normal range of motion. No rigidity.      Right lower leg: No edema.      Left lower leg: No edema.    twitchy right arms--         Skin:     General: Skin is warm and dry.      Coloration: Skin is not jaundiced.   Neurological:      General: she has a tremor on the right upper extremity.      Mental Status: She is alert and oriented to person, place, and time.  Having visual hallucinations.     Psychiatric:         Mood and Affect: Mood normal.         Behavior:  Little silly- she is confabulating answers to " "questions so she is not reliable- repeating questions can give different answers.  NUrse went by singing out side door and she says " I taught her that dance"                         Assessment and Plan:             Type 2 diabetes mellitus without complication- doing well.       Multiple myeloma - being treated-- following with ONC-      Metastatic multiple myeloma to bone     Pulmonary heart disease-- o2 sats ok-- off O2 - still use occassionally       Type 2 diabetes mellitus with diabetic peripheral angiopathy without gangrene, with long-term current use of insulin-- doing fine      Thrombocytopenia, unspecified-- stable-  plt count 159  onc monitoring             Anemia in neoplastic disease     History of auto stem cell transplant     Chronic obstructive pulmonary disease, unspecified COPD type-- doing well      From CT on 4/25/2022  Was reviewed        Descending  Thoracic Aneurysm---- Stable appearance of saccular aneurysmal dilatation at the distal thoracic aorta near the level of diaphragm with associated mural thrombus not significantly changed.  Reviewed Vascular note  Due for CT scan        Dementia-- stable - some visual hallucinating-- may be on Aricept.  Not ready for Seroquel  at night.       Plan:     She has COPD with a lot of emphysematous changes of her lungs and she has pulmonary heart disease.  I believe the oxygen would be beneficial to work to decrease her risk of right heart failure..  Last visit suggested trial at home and see it this helped.   She thinks it helps her confusion.             Weight lost graduate-- suggest ensure daily with 2 regular meals, daibetes is well controled-- discussed ensure.      I will see her back again in 6  months or sooner if needed.        Our office providers are the focal point for all needed health care services that are part of ongoing care related to a patient's single serious condition or the patient's complex conditions.   "

## 2024-08-28 DIAGNOSIS — E87.8 ELECTROLYTE ABNORMALITY: ICD-10-CM

## 2024-08-29 RX ORDER — LANOLIN ALCOHOL/MO/W.PET/CERES
2 CREAM (GRAM) TOPICAL 2 TIMES DAILY
Qty: 360 TABLET | Refills: 1 | Status: SHIPPED | OUTPATIENT
Start: 2024-08-29

## 2024-09-01 DIAGNOSIS — D75.9 DRUG-INDUCED CYTOPENIA: ICD-10-CM

## 2024-09-01 DIAGNOSIS — C90.00 MULTIPLE MYELOMA, REMISSION STATUS UNSPECIFIED: ICD-10-CM

## 2024-09-01 DIAGNOSIS — T50.905A DRUG-INDUCED CYTOPENIA: ICD-10-CM

## 2024-09-04 ENCOUNTER — INFUSION (OUTPATIENT)
Dept: INFUSION THERAPY | Facility: HOSPITAL | Age: 74
End: 2024-09-04
Payer: MEDICARE

## 2024-09-04 VITALS
TEMPERATURE: 98 F | WEIGHT: 130.19 LBS | RESPIRATION RATE: 18 BRPM | HEART RATE: 51 BPM | BODY MASS INDEX: 23.96 KG/M2 | SYSTOLIC BLOOD PRESSURE: 123 MMHG | HEIGHT: 62 IN | DIASTOLIC BLOOD PRESSURE: 53 MMHG

## 2024-09-04 DIAGNOSIS — C90.02 MULTIPLE MYELOMA IN RELAPSE: Primary | ICD-10-CM

## 2024-09-04 PROCEDURE — 96401 CHEMO ANTI-NEOPL SQ/IM: CPT | Mod: HCNC

## 2024-09-04 PROCEDURE — 63600175 PHARM REV CODE 636 W HCPCS: Mod: JZ,JG,HCNC | Performed by: INTERNAL MEDICINE

## 2024-09-04 RX ORDER — DIPHENHYDRAMINE HYDROCHLORIDE 50 MG/ML
50 INJECTION INTRAMUSCULAR; INTRAVENOUS ONCE AS NEEDED
Status: DISCONTINUED | OUTPATIENT
Start: 2024-09-04 | End: 2024-09-04 | Stop reason: HOSPADM

## 2024-09-04 RX ORDER — EPINEPHRINE 0.3 MG/.3ML
0.3 INJECTION SUBCUTANEOUS ONCE AS NEEDED
Status: CANCELLED | OUTPATIENT
Start: 2024-09-06

## 2024-09-04 RX ORDER — DIPHENHYDRAMINE HYDROCHLORIDE 50 MG/ML
50 INJECTION INTRAMUSCULAR; INTRAVENOUS ONCE AS NEEDED
Status: CANCELLED | OUTPATIENT
Start: 2024-09-06

## 2024-09-04 RX ORDER — EPINEPHRINE 0.3 MG/.3ML
0.3 INJECTION SUBCUTANEOUS ONCE AS NEEDED
Status: DISCONTINUED | OUTPATIENT
Start: 2024-09-04 | End: 2024-09-04 | Stop reason: HOSPADM

## 2024-09-04 RX ADMIN — DARATUMUMAB AND HYALURONIDASE-FIHJ (HUMAN RECOMBINANT) 1800 MG: 1800; 30000 INJECTION SUBCUTANEOUS at 01:09

## 2024-09-04 NOTE — PLAN OF CARE
1240-Labs , hx, and medications reviewed. Assessment completed. Discussed plan of care with patient. Patient in agreement. Chair reclined and warm blanket and snack offered.

## 2024-09-04 NOTE — PLAN OF CARE
1355-Patient tolerated darzalex injection well. Discharged without complaints or S/S of adverse event. AVS given.  Instructed to call provider for any questions or concerns.

## 2024-09-10 RX ORDER — OMEPRAZOLE 40 MG/1
CAPSULE, DELAYED RELEASE ORAL
Qty: 90 CAPSULE | Refills: 3 | Status: SHIPPED | OUTPATIENT
Start: 2024-09-10

## 2024-09-10 NOTE — TELEPHONE ENCOUNTER
Refill Decision Note   Shelby Thompson  is requesting a refill authorization.  Brief Assessment and Rationale for Refill:  Approve     [facility]      Pharmacist review requested: Yes   Comments:     Note composed:1:06 PM 09/10/2024

## 2024-09-10 NOTE — TELEPHONE ENCOUNTER
Refill Routing Note   Medication(s) are not appropriate for processing by [facility] for the following reason(s):        Other    [facility] action(s):  Defer      Medication Therapy Plan: FLOS; [id number] Intermediate or Poor Metabolizer / Omeprazole: INCREASED RISK of adverse events with chronic use due to predicted increased plasma concentrations    Pharmacist review requested: Yes     Appointments  past 12m or future 3m with PCP    Date Provider   Last Visit   8/19/2024 Emanuel Arevalo Jr., MD   Next Visit   12/26/2024 Emanuel Arevalo Jr., MD   ED visits in past 90 days: 0        Note composed:10:32 AM 09/10/2024

## 2024-09-10 NOTE — TELEPHONE ENCOUNTER
Care Due:                  Date            Visit Type   Department     Provider  --------------------------------------------------------------------------------                                EP -                              PRIMARY      [facility] INTERNAL  Last Visit: 08-      CARE ([facility])   ASHLI Arevalo                              [facility]                              PRIMARY      [facility] INTERNAL  Next Visit: 12-      CARE ([facility])   MEDICINE       Emanuel Arevalo                                                            Last  Test          Frequency    Reason                     Performed    Due Date  --------------------------------------------------------------------------------    HBA1C.......  6 months...  metFORMIN................  04-   10-    [facility] Embedded Care Due Messages. Reference number: [id number].   9/10/2024 12:31:46 AM CDT

## 2024-09-12 NOTE — PROGRESS NOTES
Auto SCT today, 3 bags transfused; patient tolerated well. Pre-meds given, pre- and post hydration administered. Patient required 2L NC for O2 sats in the upper 80s on room air, recovered to upper 90s. Patient with low BP (83/54) at the end of the third bag; KISHOR Wang notified. 500 cc bolus NS administered. Patient's BP recovered quickly (see flowsheet). Vitals monitored per orders. Patient instructed on how to contact the nurse. Questions have been addressed; will continue to monitor.    holding area

## 2024-09-27 ENCOUNTER — OFFICE VISIT (OUTPATIENT)
Dept: CARDIOLOGY | Facility: CLINIC | Age: 74
End: 2024-09-27
Payer: MEDICARE

## 2024-09-27 ENCOUNTER — HOSPITAL ENCOUNTER (OUTPATIENT)
Dept: CARDIOLOGY | Facility: CLINIC | Age: 74
Discharge: HOME | End: 2024-09-27
Payer: MEDICARE

## 2024-09-27 ENCOUNTER — TELEPHONE (OUTPATIENT)
Dept: CARDIOLOGY | Facility: CLINIC | Age: 74
End: 2024-09-27
Payer: MEDICARE

## 2024-09-27 VITALS
HEIGHT: 62 IN | WEIGHT: 127 LBS | OXYGEN SATURATION: 99 % | HEART RATE: 54 BPM | SYSTOLIC BLOOD PRESSURE: 120 MMHG | DIASTOLIC BLOOD PRESSURE: 60 MMHG | BODY MASS INDEX: 23.37 KG/M2

## 2024-09-27 DIAGNOSIS — Z79.4 TYPE 2 DIABETES MELLITUS WITH DIABETIC PERIPHERAL ANGIOPATHY WITHOUT GANGRENE, WITH LONG-TERM CURRENT USE OF INSULIN: ICD-10-CM

## 2024-09-27 DIAGNOSIS — F06.70 MILD NEUROCOGNITIVE DISORDER DUE TO MULTIPLE ETIOLOGIES: ICD-10-CM

## 2024-09-27 DIAGNOSIS — I69.322 DYSARTHRIA AS LATE EFFECT OF CEREBROVASCULAR ACCIDENT (CVA): ICD-10-CM

## 2024-09-27 DIAGNOSIS — R55 SYNCOPE AND COLLAPSE: ICD-10-CM

## 2024-09-27 DIAGNOSIS — Z78.9 STATIN INTOLERANCE: ICD-10-CM

## 2024-09-27 DIAGNOSIS — E11.69 HYPERLIPIDEMIA ASSOCIATED WITH TYPE 2 DIABETES MELLITUS: ICD-10-CM

## 2024-09-27 DIAGNOSIS — E78.5 HYPERLIPIDEMIA ASSOCIATED WITH TYPE 2 DIABETES MELLITUS: ICD-10-CM

## 2024-09-27 DIAGNOSIS — R00.1 BRADYCARDIA: Primary | ICD-10-CM

## 2024-09-27 DIAGNOSIS — N18.31 STAGE 3A CHRONIC KIDNEY DISEASE: ICD-10-CM

## 2024-09-27 DIAGNOSIS — E11.59 HYPERTENSION ASSOCIATED WITH DIABETES: ICD-10-CM

## 2024-09-27 DIAGNOSIS — I69.351 HEMIPLEGIA AND HEMIPARESIS FOLLOWING CEREBRAL INFARCTION AFFECTING RIGHT DOMINANT SIDE: ICD-10-CM

## 2024-09-27 DIAGNOSIS — R55 SYNCOPE: ICD-10-CM

## 2024-09-27 DIAGNOSIS — E11.51 TYPE 2 DIABETES MELLITUS WITH DIABETIC PERIPHERAL ANGIOPATHY WITHOUT GANGRENE, WITH LONG-TERM CURRENT USE OF INSULIN: ICD-10-CM

## 2024-09-27 DIAGNOSIS — I15.2 HYPERTENSION ASSOCIATED WITH DIABETES: ICD-10-CM

## 2024-09-27 DIAGNOSIS — R55 SYNCOPE AND COLLAPSE: Primary | ICD-10-CM

## 2024-09-27 DIAGNOSIS — I49.3 PVC (PREMATURE VENTRICULAR CONTRACTION): ICD-10-CM

## 2024-09-27 PROCEDURE — 99999 PR PBB SHADOW E&M-EST. PATIENT-LVL V: CPT | Mod: PBBFAC,HCNC,, | Performed by: INTERNAL MEDICINE

## 2024-09-27 NOTE — PROGRESS NOTES
Subjective:   Patient ID:  Shelby Thompson is a 74 y.o. female who presents for follow-up of Follow-up    Shelby Thompson is a 71 y.o. female who presents for follow-up of PAD (peripheral artery disease)      hypertension; hyperlipidemia; statin intolerance; PAD; diastolic dysfunction by echo; s/p CVA with residual deficit; COPD, BMI 30.54  Echo 2015    1 - Normal left ventricular systolic function (EF 60-65%).     2 - Left ventricular diastolic dysfunction.     3 - Normal right ventricular systolic function .     4 - Trivial aortic regurgitation.     5 - Trivial mitral regurgitation.     6 - Moderate tricuspid regurgitation.      Segmental Pressures Low Extremities (3/25/2015)  Resting CAILIN:  The right ankle brachial index was 1.06 which is normal.   The left ankle brachial index was 1.04 which is normal.   The right TBI is 0.85.   The left TBI is 0.70.   Resting Segmental Pressures:  The right low thigh pressure was significantly reduced with a low thigh pressure / brachial pressure of 1.13 suggesting significant right iliofemoral disease.   Left high thigh cuff pressure is markedly elevated and unreliable.The left low thigh pressure was significantly reduced with a low thigh pressure / brachial pressure of 1.38 suggesting significant left iliofemoral disease. The left below knee pressure was significantly reduced suggesting significant left femoral-popliteal obstruction.   Waveform analysis are compatible with the above findings.      HPI:   No chest pain, Orthopnea, PND of heart failure symptoms. Occasional chest pressure that is transient.   Denies palpitations or fluttering in the chest. Palpitations are transient.   Rt. Sided weaknesses due to residual CVA. Does not report claudication.   Patient feels more SOB on minimal activity.   Can hear herself wheezing when she takes a bath.      HPI:   Rt. Sided weaknesses due to residual CVA with some tremor  Patient is sedentary most of the time  No chest pain,  Orthopnea, PND of heart failure symptoms.   Feels Significantly SOB.   Still on Chemotherapy for Multiple Myeloma (she has had bone marrow transplant)  Denies claudication.      Echo 2019  Normal left ventricular systolic function. The estimated ejection fraction is 60%  The left ventricular global longitudinal strain is -18.6%.  Grade II (moderate) left ventricular diastolic dysfunction consistent with pseudonormalization.  No wall motion abnormalities.  Severe left atrial enlargement.  Normal right ventricular systolic function.  Mild-to-moderate mitral regurgitation.  Mild tricuspid regurgitation.  The estimated PA systolic pressure is 41 mm Hg  Pulmonary hypertension present.  Normal central venous pressure (3 mm Hg).             HPI:   She uses a walker and her granddaughter takes care of her  No chest pain, Orthopnea, PND of heart failure symptoms.   Denies palpitations or fluttering in the chest  She is sednetary  She gets help with her granddaughter for showering etc.      ER visit /5/24  Patient admitted for concern of syncope in setting of bradycardia. On interview, it appears patient most likely tripped and fell, especially in light of patient not using her walker to ambulate. CTH/CT cervical were without acute pathology. EKG sinus bradycardia, biphasic P waves, normal intervals, no ST/T wave abnormalities (similar to prior). Labs unrevealing of organic cause of fall: CPK 35, ABC 2.3, Hgb 9.2, Hct 30.5, Ach664, Na 130, K 4.5, Cl 106, Bicarb 29, BGL 63, BUN 13, Cr 0.8, , Trop <0.006. an echocardiogram was obtain out of abundance of caution, which did not reveal any new structural heart disease. Per  at bedside, patient was at her mental baseline. She was medically cleared for discharge home with PCP and cardiology follow ups.       Patient Active Problem List   Diagnosis    Personal history of malignant neoplasm of breast    Mononeuritis of upper limb    Hyperlipidemia associated with type 2  "diabetes mellitus    Gastropathy    Tremor    Nuclear sclerosis    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    Aneurysm of descending thoracic aorta without rupture    Aortic atherosclerosis    History of CVA with residual deficit    Osteopenia    PAD (peripheral artery disease)    Liver mass    Recurrent major depressive disorder, in full remission    Statin intolerance    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)    Pulmonary heart disease    Ectatic aorta    Tortuous aorta    Budd-Chiari syndrome    PVC (premature ventricular contraction)    Multiple myeloma in relapse    Metastatic multiple myeloma to bone    Status post autologous bone marrow transplant    Abnormal PFT    Infection due to human metapneumovirus (hMPV)    Iron deficiency anemia due to chronic blood loss    History of colon polyps    Drug-induced polyneuropathy    Hypertension associated with diabetes    Multiple myeloma in remission    Anemia in neoplastic disease    Hemiplegia and hemiparesis following cerebral infarction affecting right dominant side    Abnormal findings on diagnostic imaging of liver and biliary tract     Type 2 diabetes mellitus with diabetic peripheral angiopathy without gangrene, with long-term current use of insulin    History of auto stem cell transplant    Chronic obstructive pulmonary disease, unspecified COPD type    Mild neurocognitive disorder due to multiple etiologies    Immunocompromised    Mild vascular dementia    Stage 3a chronic kidney disease    Fall    Bradycardia    Dementia     /60   Pulse (!) 54   Ht 5' 2" (1.575 m)   Wt 57.6 kg (127 lb)   LMP  (LMP Unknown)   SpO2 99%   BMI 23.23 kg/m²   Body mass index is 23.23 kg/m².  CrCl cannot be calculated (Patient's most recent lab result is older than the maximum 7 days allowed.).    Lab Results   Component Value Date     09/04/2024    K 4.2 09/04/2024     09/04/2024    CO2 27 09/04/2024    " BUN 19 09/04/2024    CREATININE 1.0 09/04/2024    GLU 80 09/04/2024    HGBA1C 5.4 04/18/2024    MG 0.9 (LL) 01/12/2024    AST 12 09/04/2024    ALT 10 09/04/2024    ALBUMIN 4.2 09/04/2024    PROT 7.6 09/04/2024    BILITOT 0.5 09/04/2024    WBC 2.36 (L) 09/04/2024    HGB 10.2 (L) 09/04/2024    HCT 34.6 (L) 09/04/2024    HCT 26 (L) 05/02/2024    MCV 82 09/04/2024     (L) 09/04/2024    INR 0.9 10/16/2020    TSH 3.662 02/10/2023    CHOL 114 (L) 04/18/2024    HDL 61 04/18/2024    LDLCALC 34.8 (L) 04/18/2024    TRIG 91 04/18/2024       Lab Results   Component Value Date    HSCRP 6.09 (H) 05/22/2015    CRP 5.2 11/05/2019         No results found for this or any previous visit.        Current Outpatient Medications   Medication Sig    acyclovir (ZOVIRAX) 400 MG tablet TAKE 1 TABLET BY MOUTH TWICE A DAY    albuterol (ACCUNEB) 1.25 mg/3 mL Nebu Take 3 mLs (1.25 mg total) by nebulization every 6 (six) hours as needed (wheeze/cough). Rescue    amLODIPine (NORVASC) 5 MG tablet TAKE 1 TABLET BY MOUTH EVERY DAY    aspirin (ECOTRIN) 81 MG EC tablet TAKE 1 TABLET BY MOUTH EVERY DAY    b complex vitamins (B COMPLEX-VITAMIN B12) tablet Take 1 tablet by mouth once daily.    cholecalciferol, vitamin D3, 125 mcg (5,000 unit) Tab 1 tablet DAILY (route: oral)    cyanocobalamin, vitamin B-12, 5,000 mcg Subl Place one under tongue once a day for 1 month, then continue to take under tongue per week    DARZALEX FASPRO 1,800 mg-30,000 unit/15 mL Soln     ENGERIX-B, PF, 20 mcg/mL Syrg     evolocumab (REPATHA SURECLICK) 140 mg/mL PnIj Inject 1 mL (140 mg total) into the skin every 14 (fourteen) days.    ferrous gluconate 324 mg (37.5 mg iron) Tab tablet 1 tablet DAILY (route: oral)    fluticasone propionate (FLONASE) 50 mcg/actuation nasal spray 1 spray (50 mcg total) by Each Nostril route once daily.    gabapentin (NEURONTIN) 300 MG capsule Take 1 capsule (300 mg total) by mouth 2 (two) times daily.    GADAVIST 1 mmol/mL (604.72 mg/mL)  Soln injection     loperamide (IMODIUM) 2 mg capsule TAKE 1 CAPSULE BY MOUTH 4 TIMES DAILY AS NEEDED FOR DIARRHEA.    magnesium oxide (MAG-OX) 400 mg (241.3 mg magnesium) tablet Take 2 tablets (800 mg total) by mouth 2 (two) times daily.    metFORMIN (GLUCOPHAGE) 1000 MG tablet Take 1 tablet (1,000 mg total) by mouth 2 (two) times daily with meals.    omeprazole (PRILOSEC) 40 MG capsule TAKE 1 CAPSULE BY MOUTH EVERY DAY    OXYGEN-AIR DELIVERY SYSTEMS MISC     pomalidomide (POMALYST) 2 mg Cap TAKE 1 CAPSULE BY MOUTH EVERY DAY    potassium chloride SA (K-DUR,KLOR-CON) 20 MEQ tablet Take 1 tablet (20 mEq total) by mouth once daily.    sertraline (ZOLOFT) 50 MG tablet Take 1 tablet (50 mg total) by mouth once daily.    valsartan (DIOVAN) 160 MG tablet Take 1 tablet (160 mg total) by mouth once daily.    VENOFER 100 mg iron/5 mL injection     zoledronic 4 mg/100 mL PgBk infusion     zoledronic acid (ZOMETA) 4 mg/5 mL injection      No current facility-administered medications for this visit.       Review of Systems   Constitutional: Negative for chills, decreased appetite, malaise/fatigue, night sweats, weight gain and weight loss.        Uses a walker   Eyes:  Negative for blurred vision, double vision, visual disturbance and visual halos.   Cardiovascular:  Negative for chest pain, claudication, cyanosis, dyspnea on exertion, irregular heartbeat, leg swelling, near-syncope, orthopnea, palpitations, paroxysmal nocturnal dyspnea and syncope.   Respiratory:  Negative for cough, hemoptysis, snoring, sputum production and wheezing.    Endocrine: Negative for cold intolerance, heat intolerance, polydipsia and polyphagia.   Hematologic/Lymphatic: Negative for adenopathy and bleeding problem. Does not bruise/bleed easily.   Skin:  Negative for flushing, itching, poor wound healing and rash.   Musculoskeletal:  Negative for arthritis, back pain, falls, gout, joint pain, joint swelling, muscle cramps, muscle weakness, myalgias,  neck pain and stiffness.   Gastrointestinal:  Negative for bloating, abdominal pain, anorexia, diarrhea, dysphagia, excessive appetite, flatus, hematemesis, jaundice, melena and nausea.   Genitourinary:  Negative for hesitancy and incomplete emptying.   Neurological:  Negative for aphonia, brief paralysis, difficulty with concentration, disturbances in coordination, excessive daytime sleepiness, dizziness, focal weakness, light-headedness, loss of balance and weakness.   Psychiatric/Behavioral:  Negative for altered mental status, depression, hallucinations, hypervigilance, memory loss, substance abuse and suicidal ideas. The patient does not have insomnia and is not nervous/anxious.        Objective:   Physical Exam  Constitutional:       General: She is not in acute distress.     Appearance: She is well-developed. She is not diaphoretic.   HENT:      Head: Normocephalic and atraumatic.      Nose: Nose normal.      Mouth/Throat:      Pharynx: No oropharyngeal exudate.   Eyes:      General: No scleral icterus.        Right eye: No discharge.         Left eye: No discharge.      Conjunctiva/sclera: Conjunctivae normal.      Pupils: Pupils are equal, round, and reactive to light.   Neck:      Thyroid: No thyromegaly.      Vascular: No JVD.      Trachea: No tracheal deviation.   Cardiovascular:      Rate and Rhythm: Normal rate and regular rhythm.      Pulses: Intact distal pulses.      Heart sounds: Normal heart sounds. No murmur heard.     No friction rub. No gallop.   Pulmonary:      Effort: Pulmonary effort is normal. No respiratory distress.      Breath sounds: Normal breath sounds. No stridor. No wheezing or rales.   Chest:      Chest wall: No tenderness.   Abdominal:      General: Bowel sounds are normal. There is no distension.      Palpations: Abdomen is soft. There is no mass.      Tenderness: There is no abdominal tenderness. There is no guarding or rebound.   Musculoskeletal:         General: No tenderness.  Normal range of motion.      Cervical back: Normal range of motion and neck supple.   Lymphadenopathy:      Cervical: No cervical adenopathy.   Skin:     General: Skin is warm.      Coloration: Skin is not pale.      Findings: No erythema or rash.   Neurological:      Mental Status: She is alert and oriented to person, place, and time.      Cranial Nerves: No cranial nerve deficit.      Motor: No abnormal muscle tone.      Coordination: Coordination normal.      Deep Tendon Reflexes: Reflexes are normal and symmetric. Reflexes normal.   Psychiatric:         Behavior: Behavior normal.         Thought Content: Thought content normal.         Judgment: Judgment normal.         Assessment:     1. Hemiplegia and hemiparesis following cerebral infarction affecting right dominant side    2. Bradycardia    3. Syncope    4. Mild neurocognitive disorder due to multiple etiologies    5. 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 Hyperlipidemia associated with type 2 diabetes mellitus    7. Hypertension associated with diabetes    8. PVC (premature ventricular contraction)    9. Stage 3a chronic kidney disease    10. Type 2 diabetes mellitus with diabetic peripheral angiopathy without gangrene, with long-term current use of insulin    11. Statin intolerance        Plan:   Shelby was seen today for follow-up.    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 Recommend BP diary. Call if systolic BP > 130 mmHg on checking repeatedly    Its unclear if patient may have had issues with syncope. Will r/o heart block with event monitor , she is not on a beta blocker. The care giver accompanying the patient denies issues with syncope.

## 2024-09-30 ENCOUNTER — INFUSION (OUTPATIENT)
Dept: INFUSION THERAPY | Facility: HOSPITAL | Age: 74
End: 2024-09-30
Payer: MEDICARE

## 2024-09-30 ENCOUNTER — OFFICE VISIT (OUTPATIENT)
Dept: HEMATOLOGY/ONCOLOGY | Facility: CLINIC | Age: 74
End: 2024-09-30
Payer: MEDICARE

## 2024-09-30 ENCOUNTER — LAB VISIT (OUTPATIENT)
Dept: LAB | Facility: HOSPITAL | Age: 74
End: 2024-09-30
Attending: INTERNAL MEDICINE
Payer: MEDICARE

## 2024-09-30 VITALS
SYSTOLIC BLOOD PRESSURE: 125 MMHG | HEIGHT: 62 IN | OXYGEN SATURATION: 91 % | HEART RATE: 86 BPM | WEIGHT: 126.13 LBS | BODY MASS INDEX: 23.21 KG/M2 | TEMPERATURE: 98 F | DIASTOLIC BLOOD PRESSURE: 64 MMHG

## 2024-09-30 DIAGNOSIS — F03.90 DEMENTIA, UNSPECIFIED DEMENTIA SEVERITY, UNSPECIFIED DEMENTIA TYPE, UNSPECIFIED WHETHER BEHAVIORAL, PSYCHOTIC, OR MOOD DISTURBANCE OR ANXIETY: ICD-10-CM

## 2024-09-30 DIAGNOSIS — D84.821 IMMUNODEFICIENCY DUE TO DRUGS: ICD-10-CM

## 2024-09-30 DIAGNOSIS — Z79.899 IMMUNODEFICIENCY DUE TO DRUGS: ICD-10-CM

## 2024-09-30 DIAGNOSIS — D50.9 IRON DEFICIENCY ANEMIA, UNSPECIFIED IRON DEFICIENCY ANEMIA TYPE: ICD-10-CM

## 2024-09-30 DIAGNOSIS — C90.02 MULTIPLE MYELOMA IN RELAPSE: Primary | ICD-10-CM

## 2024-09-30 DIAGNOSIS — C90.00 MULTIPLE MYELOMA, REMISSION STATUS UNSPECIFIED: ICD-10-CM

## 2024-09-30 DIAGNOSIS — C90.00 MULTIPLE MYELOMA, REMISSION STATUS UNSPECIFIED: Primary | ICD-10-CM

## 2024-09-30 DIAGNOSIS — Z94.84 HISTORY OF AUTO STEM CELL TRANSPLANT: ICD-10-CM

## 2024-09-30 DIAGNOSIS — T50.905A DRUG-INDUCED CYTOPENIA: ICD-10-CM

## 2024-09-30 DIAGNOSIS — D75.9 DRUG-INDUCED CYTOPENIA: ICD-10-CM

## 2024-09-30 LAB
ALBUMIN SERPL BCP-MCNC: 3.1 G/DL (ref 3.5–5.2)
ALP SERPL-CCNC: 55 U/L (ref 55–135)
ALT SERPL W/O P-5'-P-CCNC: 7 U/L (ref 10–44)
ANION GAP SERPL CALC-SCNC: 12 MMOL/L (ref 8–16)
AST SERPL-CCNC: 9 U/L (ref 10–40)
BASOPHILS NFR BLD: 0 % (ref 0–1.9)
BILIRUB SERPL-MCNC: 0.7 MG/DL (ref 0.1–1)
BUN SERPL-MCNC: 16 MG/DL (ref 8–23)
CALCIUM SERPL-MCNC: 9.1 MG/DL (ref 8.7–10.5)
CHLORIDE SERPL-SCNC: 108 MMOL/L (ref 95–110)
CO2 SERPL-SCNC: 23 MMOL/L (ref 23–29)
CREAT SERPL-MCNC: 1 MG/DL (ref 0.5–1.4)
DIFFERENTIAL METHOD BLD: ABNORMAL
EOSINOPHIL NFR BLD: 1 % (ref 0–8)
ERYTHROCYTE [DISTWIDTH] IN BLOOD BY AUTOMATED COUNT: 17.4 % (ref 11.5–14.5)
EST. GFR  (NO RACE VARIABLE): 58.8 ML/MIN/1.73 M^2
FERRITIN SERPL-MCNC: 300 NG/ML (ref 20–300)
GLUCOSE SERPL-MCNC: 111 MG/DL (ref 70–110)
HCT VFR BLD AUTO: 30.5 % (ref 37–48.5)
HGB BLD-MCNC: 9.5 G/DL (ref 12–16)
IGA SERPL-MCNC: 104 MG/DL (ref 40–350)
IGG SERPL-MCNC: 1087 MG/DL (ref 650–1600)
IGM SERPL-MCNC: 14 MG/DL (ref 50–300)
IMM GRANULOCYTES # BLD AUTO: ABNORMAL K/UL (ref 0–0.04)
IMM GRANULOCYTES NFR BLD AUTO: ABNORMAL % (ref 0–0.5)
LYMPHOCYTES NFR BLD: 26 % (ref 18–48)
MCH RBC QN AUTO: 24.3 PG (ref 27–31)
MCHC RBC AUTO-ENTMCNC: 31.1 G/DL (ref 32–36)
MCV RBC AUTO: 78 FL (ref 82–98)
MONOCYTES NFR BLD: 9 % (ref 4–15)
NEUTROPHILS NFR BLD: 56 % (ref 38–73)
NEUTS BAND NFR BLD MANUAL: 8 %
NRBC BLD-RTO: 0 /100 WBC
PLATELET # BLD AUTO: 185 K/UL (ref 150–450)
PMV BLD AUTO: 11.6 FL (ref 9.2–12.9)
POTASSIUM SERPL-SCNC: 3.9 MMOL/L (ref 3.5–5.1)
PROT SERPL-MCNC: 7.5 G/DL (ref 6–8.4)
RBC # BLD AUTO: 3.91 M/UL (ref 4–5.4)
SODIUM SERPL-SCNC: 143 MMOL/L (ref 136–145)
WBC # BLD AUTO: 5.17 K/UL (ref 3.9–12.7)

## 2024-09-30 PROCEDURE — 82728 ASSAY OF FERRITIN: CPT | Mod: HCNC | Performed by: INTERNAL MEDICINE

## 2024-09-30 PROCEDURE — 84165 PROTEIN E-PHORESIS SERUM: CPT | Mod: HCNC | Performed by: INTERNAL MEDICINE

## 2024-09-30 PROCEDURE — 3044F HG A1C LEVEL LT 7.0%: CPT | Mod: HCNC,CPTII,S$GLB, | Performed by: INTERNAL MEDICINE

## 2024-09-30 PROCEDURE — 85027 COMPLETE CBC AUTOMATED: CPT | Mod: HCNC | Performed by: INTERNAL MEDICINE

## 2024-09-30 PROCEDURE — 3288F FALL RISK ASSESSMENT DOCD: CPT | Mod: HCNC,CPTII,S$GLB, | Performed by: INTERNAL MEDICINE

## 2024-09-30 PROCEDURE — 84165 PROTEIN E-PHORESIS SERUM: CPT | Mod: 26,HCNC,, | Performed by: PATHOLOGY

## 2024-09-30 PROCEDURE — 4010F ACE/ARB THERAPY RXD/TAKEN: CPT | Mod: HCNC,CPTII,S$GLB, | Performed by: INTERNAL MEDICINE

## 2024-09-30 PROCEDURE — 1159F MED LIST DOCD IN RCRD: CPT | Mod: HCNC,CPTII,S$GLB, | Performed by: INTERNAL MEDICINE

## 2024-09-30 PROCEDURE — 36415 COLL VENOUS BLD VENIPUNCTURE: CPT | Mod: HCNC | Performed by: INTERNAL MEDICINE

## 2024-09-30 PROCEDURE — 3061F NEG MICROALBUMINURIA REV: CPT | Mod: HCNC,CPTII,S$GLB, | Performed by: INTERNAL MEDICINE

## 2024-09-30 PROCEDURE — 3074F SYST BP LT 130 MM HG: CPT | Mod: HCNC,CPTII,S$GLB, | Performed by: INTERNAL MEDICINE

## 2024-09-30 PROCEDURE — 1125F AMNT PAIN NOTED PAIN PRSNT: CPT | Mod: HCNC,CPTII,S$GLB, | Performed by: INTERNAL MEDICINE

## 2024-09-30 PROCEDURE — 85007 BL SMEAR W/DIFF WBC COUNT: CPT | Mod: HCNC | Performed by: INTERNAL MEDICINE

## 2024-09-30 PROCEDURE — 3078F DIAST BP <80 MM HG: CPT | Mod: HCNC,CPTII,S$GLB, | Performed by: INTERNAL MEDICINE

## 2024-09-30 PROCEDURE — 1101F PT FALLS ASSESS-DOCD LE1/YR: CPT | Mod: HCNC,CPTII,S$GLB, | Performed by: INTERNAL MEDICINE

## 2024-09-30 PROCEDURE — 99214 OFFICE O/P EST MOD 30 MIN: CPT | Mod: HCNC,S$GLB,, | Performed by: INTERNAL MEDICINE

## 2024-09-30 PROCEDURE — 99999 PR PBB SHADOW E&M-EST. PATIENT-LVL IV: CPT | Mod: PBBFAC,HCNC,, | Performed by: INTERNAL MEDICINE

## 2024-09-30 PROCEDURE — 3072F LOW RISK FOR RETINOPATHY: CPT | Mod: HCNC,CPTII,S$GLB, | Performed by: INTERNAL MEDICINE

## 2024-09-30 PROCEDURE — 83521 IG LIGHT CHAINS FREE EACH: CPT | Mod: 59,HCNC | Performed by: INTERNAL MEDICINE

## 2024-09-30 PROCEDURE — 82784 ASSAY IGA/IGD/IGG/IGM EACH: CPT | Mod: 59,HCNC | Performed by: INTERNAL MEDICINE

## 2024-09-30 PROCEDURE — 3066F NEPHROPATHY DOC TX: CPT | Mod: HCNC,CPTII,S$GLB, | Performed by: INTERNAL MEDICINE

## 2024-09-30 PROCEDURE — 80053 COMPREHEN METABOLIC PANEL: CPT | Mod: HCNC | Performed by: INTERNAL MEDICINE

## 2024-09-30 PROCEDURE — 96401 CHEMO ANTI-NEOPL SQ/IM: CPT | Mod: HCNC

## 2024-09-30 PROCEDURE — 63600175 PHARM REV CODE 636 W HCPCS: Mod: JZ,JG,HCNC | Performed by: INTERNAL MEDICINE

## 2024-09-30 PROCEDURE — G2211 COMPLEX E/M VISIT ADD ON: HCPCS | Mod: HCNC,S$GLB,, | Performed by: INTERNAL MEDICINE

## 2024-09-30 RX ORDER — AZITHROMYCIN 250 MG/1
TABLET, FILM COATED ORAL
COMMUNITY
Start: 2024-09-30

## 2024-09-30 RX ORDER — DIPHENHYDRAMINE HYDROCHLORIDE 50 MG/ML
50 INJECTION INTRAMUSCULAR; INTRAVENOUS ONCE AS NEEDED
Status: DISCONTINUED | OUTPATIENT
Start: 2024-09-30 | End: 2024-09-30 | Stop reason: HOSPADM

## 2024-09-30 RX ORDER — DIPHENHYDRAMINE HYDROCHLORIDE 50 MG/ML
50 INJECTION INTRAMUSCULAR; INTRAVENOUS ONCE AS NEEDED
Status: CANCELLED | OUTPATIENT
Start: 2024-10-02

## 2024-09-30 RX ORDER — EPINEPHRINE 0.3 MG/.3ML
0.3 INJECTION SUBCUTANEOUS ONCE AS NEEDED
Status: DISCONTINUED | OUTPATIENT
Start: 2024-09-30 | End: 2024-09-30 | Stop reason: HOSPADM

## 2024-09-30 RX ORDER — EPINEPHRINE 0.3 MG/.3ML
0.3 INJECTION SUBCUTANEOUS ONCE AS NEEDED
Status: CANCELLED | OUTPATIENT
Start: 2024-10-02

## 2024-09-30 RX ADMIN — DARATUMUMAB AND HYALURONIDASE-FIHJ (HUMAN RECOMBINANT) 1800 MG: 1800; 30000 INJECTION SUBCUTANEOUS at 04:09

## 2024-09-30 NOTE — PROGRESS NOTES
Section of Hematology and Stem Cell Transplantation  Follow-Up Note     09/30/2024    Primary Oncologic Diagnosis: Multiple Myeloma    History of Present Ilness:   Shelby Thompson (Shelby) is a pleasant 74 y.o.female of [place], [place] is here for follow-up of her multiple myeloma. Past medical history 2 prior CVAs (one in 2008, one in 2011)  L breast cancer DCIS stage 0 (s/p lumpectomy and radiation, no endocrine tx due to CVA), HTN, DM II, Neuropathy, b/l hands, prior smoker, quit in 2011, Statin Intolerance - on praluent, PCSK9 inhibitor  Hepatic lesions - vascular per recent MRI in 09 /2019 with no changes; will confirm no further rascon needed from help, HFpEF - EF 55%, diastolic dysfunction, Anxiety/Depression, and dementia.    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  She achieved and tolerated well VRd x completing 7, 28 day cycles and achieving deep VGPR to induction. Then underwent Melphalan autologous stem cell transplant on 2/12/2020.     ADMISSION SUMMARY: 2/10-2/26/2020  Admitted 2/10, tolerated chemo and transplant well. Engrafted on day +12. Remained afebrile throughout hospital stay and no mucositis. Experienced expected side effects of nausea and diarrhea controlled with antiemetics and Imodium. Discharged home with home PT/OT    Interval History 4/30/24:  Ms. Thompson is a 74 y.o. female who presents for  follow up.  She is 4 yrs 2  months post AutoSCT. Relapsed  at approximately  1 year post sct and was on Sravanthi/zhanna/dex until summer 2022 when changed to sravanthi/pom/dex due to biochemical only progression. Dex stopped due to mood issues.  Remains on sravanthi/pom  salvage. Tolerating dose adjusted pomalyst.  Per friend accompanying  Her dementia continues to worsen  from last appt.   Infirmed  who is her caretaker having issues caring for her at  home. They have established neurologist.     Interval History 9/30/24  Dementia worsened.     Myeloma under good control.  Accompanied by friend.     Past Medical History, Social History, and Past Family History are unchanged since last evaluation except for HPI.    CURRENT MEDICATIONS:   Current Outpatient Medications   Medication Sig    acyclovir (ZOVIRAX) 400 MG tablet TAKE 1 TABLET BY MOUTH TWICE A DAY    albuterol (ACCUNEB) 1.25 mg/3 mL Nebu Take 3 mLs (1.25 mg total) by nebulization every 6 (six) hours as needed (wheeze/cough). Rescue    amLODIPine (NORVASC) 5 MG tablet TAKE 1 TABLET BY MOUTH EVERY DAY    aspirin (ECOTRIN) 81 MG EC tablet TAKE 1 TABLET BY MOUTH EVERY DAY    azithromycin (Z-LAKSHMI) 250 MG tablet     b complex vitamins (B COMPLEX-VITAMIN B12) tablet Take 1 tablet by mouth once daily.    cholecalciferol, vitamin D3, 125 mcg (5,000 unit) Tab 1 tablet DAILY (route: oral)    cyanocobalamin, vitamin B-12, 5,000 mcg Subl Place one under tongue once a day for 1 month, then continue to take under tongue per week    DARZALEX FASPRO 1,800 mg-30,000 unit/15 mL Soln     ENGERIX-B, PF, 20 mcg/mL Syrg     evolocumab (REPATHA SURECLICK) 140 mg/mL PnIj Inject 1 mL (140 mg total) into the skin every 14 (fourteen) days.    ferrous gluconate 324 mg (37.5 mg iron) Tab tablet 1 tablet DAILY (route: oral)    fluticasone propionate (FLONASE) 50 mcg/actuation nasal spray 1 spray (50 mcg total) by Each Nostril route once daily.    gabapentin (NEURONTIN) 300 MG capsule Take 1 capsule (300 mg total) by mouth 2 (two) times daily.    GADAVIST 1 mmol/mL (604.72 mg/mL) Soln injection     loperamide (IMODIUM) 2 mg capsule TAKE 1 CAPSULE BY MOUTH 4 TIMES DAILY AS NEEDED FOR DIARRHEA.    magnesium oxide (MAG-OX) 400 mg (241.3 mg magnesium) tablet Take 2 tablets (800 mg total) by mouth 2 (two) times daily.    metFORMIN (GLUCOPHAGE) 1000 MG tablet Take 1 tablet  (1,000 mg total) by mouth 2 (two) times daily with meals.    omeprazole (PRILOSEC) 40 MG capsule TAKE 1 CAPSULE BY MOUTH EVERY DAY    OXYGEN-AIR DELIVERY SYSTEMS MISC     pomalidomide (POMALYST) 2 mg Cap TAKE 1 CAPSULE BY MOUTH EVERY DAY    potassium chloride SA (K-DUR,KLOR-CON) 20 MEQ tablet Take 1 tablet (20 mEq total) by mouth once daily.    sertraline (ZOLOFT) 50 MG tablet Take 1 tablet (50 mg total) by mouth once daily.    valsartan (DIOVAN) 160 MG tablet Take 1 tablet (160 mg total) by mouth once daily.    VENOFER 100 mg iron/5 mL injection     zoledronic 4 mg/100 mL PgBk infusion     zoledronic acid (ZOMETA) 4 mg/5 mL injection      No current facility-administered medications for this visit.     ALLERGIES:   Review of patient's allergies indicates:   Allergen Reactions    Lipitor [atorvastatin] Itching     Itching, elevated cpk, muscle aches, statin induced myositis       Review of Systems:     Review of Systems   Constitutional:  Negative for chills, fever and weight loss.   HENT:  Positive for congestion. Negative for sinus pain.    Respiratory:  Negative for cough and shortness of breath.    Cardiovascular:  Negative for chest pain.   Gastrointestinal:  Negative for constipation, diarrhea, nausea and vomiting.   Genitourinary:  Negative for dysuria.   Musculoskeletal:  Negative for back pain.   Neurological:  Positive for weakness. Negative for dizziness and headaches.   Endo/Heme/Allergies:  Does not bruise/bleed easily.   Psychiatric/Behavioral:  Positive for memory loss. The patient does not have insomnia.      Physical Exam:     Vitals:    09/30/24 1527   BP: 125/64   Pulse: 86   Temp: 98.4 °F (36.9 °C)       Physical Exam  Vitals reviewed.   Constitutional:       General: She is not in acute distress.     Appearance: Normal appearance. She is normal weight. She is not ill-appearing.   HENT:      Head: Normocephalic and atraumatic.      Nose: No congestion.      Mouth/Throat:      Mouth: Mucous  membranes are moist.   Eyes:      Pupils: Pupils are equal, round, and reactive to light.   Cardiovascular:      Rate and Rhythm: Regular rhythm. Bradycardia present.      Pulses: Normal pulses.      Heart sounds: Normal heart sounds.   Pulmonary:      Effort: Pulmonary effort is normal.      Breath sounds: Normal breath sounds. No wheezing.   Abdominal:      General: Bowel sounds are normal.      Palpations: Abdomen is soft.      Tenderness: There is no abdominal tenderness.   Musculoskeletal:         General: Normal range of motion.      Cervical back: Normal range of motion and neck supple. No rigidity.      Right lower leg: No edema.      Left lower leg: No edema.   Skin:     General: Skin is warm and dry.      Capillary Refill: Capillary refill takes less than 2 seconds.      Findings: No lesion.   Neurological:      Mental Status: She is alert. Mental status is at baseline. She is disoriented.      Motor: Weakness present.      Comments: Hand tremors noted, R>L   Psychiatric:         Mood and Affect: Mood normal.         Behavior: Behavior normal.        ECOG Performance Status: (foot note - ECOG PS provided by Eastern Cooperative Oncology Group) 1 - Symptomatic but completely ambulatory    Karnofsky Performance Score:  50%- Requires Considerable Assistance and Frequent Medical Care    Labs:   Lab Results   Component Value Date    WBC 5.17 09/30/2024    HGB 9.5 (L) 09/30/2024    HCT 30.5 (L) 09/30/2024    MCV 78 (L) 09/30/2024     09/30/2024     Lab Results   Component Value Date     09/30/2024    K 3.9 09/30/2024     09/30/2024    CO2 23 09/30/2024    BUN 16 09/30/2024    CREATININE 1.0 09/30/2024    ALBUMIN 3.1 (L) 09/30/2024    BILITOT 0.7 09/30/2024    ALKPHOS 55 09/30/2024    AST 9 (L) 09/30/2024    ALT 7 (L) 09/30/2024     IgG   Date Value Ref Range Status   09/30/2024 1087 650 - 1600 mg/dL Final     Comment:     IgG Cord Blood Reference Range: 650-1600 mg/dL.     IgA   Date Value Ref  Range Status   09/30/2024 104 40 - 350 mg/dL Final     Comment:     IgA Cord Blood Reference Range: <5 mg/dL.     IgM   Date Value Ref Range Status   09/30/2024 14 (L) 50 - 300 mg/dL Final     Comment:     IgM Cord Blood Reference Range: <25 mg/dL.     Kappa Free Light Chains   Date Value Ref Range Status   09/30/2024 10.76 (H) 0.33 - 1.94 mg/dL Final   09/04/2024 10.55 (H) 0.33 - 1.94 mg/dL Final   08/05/2024 12.04 (H) 0.33 - 1.94 mg/dL Final     Lambda Free Light Chains   Date Value Ref Range Status   09/30/2024 1.78 0.57 - 2.63 mg/dL Final   09/04/2024 1.95 0.57 - 2.63 mg/dL Final   08/05/2024 1.95 0.57 - 2.63 mg/dL Final     Kappa/Lambda FLC Ratio   Date Value Ref Range Status   09/30/2024 6.04 (H) 0.26 - 1.65 Final     Comment:     Undetected antigen excess is a rare event but cannot   be excluded. If these free light chain results do not   agree with other clinical or laboratory findings or   if the sample is from a patient that has previously   demonstrated antigen excess, discuss with the testing   laboratory.   Results should always be interpreted in conjunction   with other laboratory tests and clinical evidence.     09/04/2024 5.41 (H) 0.26 - 1.65 Final     Comment:     Undetected antigen excess is a rare event but cannot   be excluded. If these free light chain results do not   agree with other clinical or laboratory findings or   if the sample is from a patient that has previously   demonstrated antigen excess, discuss with the testing   laboratory.   Results should always be interpreted in conjunction   with other laboratory tests and clinical evidence.     08/05/2024 6.17 (H) 0.26 - 1.65 Final     Comment:     Undetected antigen excess is a rare event but cannot   be excluded. If these free light chain results do not   agree with other clinical or laboratory findings or   if the sample is from a patient that has previously   demonstrated antigen excess, discuss with the testing   laboratory.   Results  should always be interpreted in conjunction   with other laboratory tests and clinical evidence.       Pathologist Interpretation SPE   Date Value Ref Range Status   09/30/2024 REVIEWED  Final     Comment:       Electronically reviewed and signed by:  Shelli Haynes MD  Signed on 10/01/24 at 13:18  Normal total protein. Normal gamma globulins are decreased.   Paraprotein peak in near-gamma = 0.40 g/dL (previously 0.59 g/dL).      09/04/2024 REVIEWED  Final     Comment:       Electronically reviewed and signed by:  Shelli Haynes MD  Signed on 09/05/24 at 14:09  Normal total protein. Normal gamma globulins are decreased.   Paraprotein peak in near-gamma = 0.59 g/dL (previously 0.55 g/dL).      08/05/2024 REVIEWED  Final     Comment:       Electronically reviewed and signed by:  Shelli Haynes MD  Signed on 08/06/24 at 12:42  Normal total protein. Normal gamma globulins are decreased.   Paraprotein peak in near-gamma = 0.55 g/dL (previously 0.58 g/dL).        Pathologist Interpretation NATALY   Date Value Ref Range Status   03/04/2024 REVIEWED  Final     Comment:       Electronically reviewed and signed by:  Shelli Haynes MD  Signed on 03/06/24 at 10:12  IgG kappa specific monoclonal band present.        Lab Results   Component Value Date    UIBC 372 (H) 07/13/2012    IRON 50 06/10/2024    TRANSFERRIN 249 06/10/2024    TIBC 369 06/10/2024    FESATURATED 14 (L) 06/10/2024             Assessment and Plan:     Problem List Items Addressed This Visit       Multiple myeloma, remission status unspecified  - high risk MM with 17p deletion  - 3  year 7 months s/p Auto SCT  - Started maintenance q2w velcade 5/6/20   - Serial biochemical relapse noted and given this and high risk CG, transitioned to Sravanthi/Noble (1mg/m2)/dex Salvage July 2021    - Due to uncontrolled hyperglycemia dex stopped after C4.    - Supportive meds: continue ppx acyclovir, VTE ppx with asa; resume maintenance zometa q 3 months;  she is  past due to for next dose; will schedule with next sravanthi injection   - She demonstrated mild biochemical progression over Summer 2022, with no CRAB, in light of this and high risk CG we transitioned her therapy from Sravanthi/Velcade to Sravanthi/pomalyst; intolerant to dex.   - Pomalyst dose decreased to to 2mg (5/19/23) due to persistent neutropenia now improved   - No CRAB criteria noted today; myeloma labs stable   - Continue PRN imodium for occasional loose stool with pomalyst   - VTE prophylaxis - Continue ASA 81mg  - Continue q4 week labs/sravanthi and q8 week MD appt      History of auto stem cell transplant  - AUTO SCT on 2/12/20  - Post-transplant vaccines complete   - Today is day +1692, now 4 years, 7 months post-transplant      Dementia, unspecified dementia severity, unspecified dementia type, unspecified whether behavioral, psychotic, or mood disturbance or anxiety  - Worsened but still oriented to person but disoriented to time   - Mgmt per neurology and pcp   - Discussed referral and consideration for nursing home/memory care placement but per friend  declined  - Discussion deferred to PCP/neurology for placement  - They desire to continue cancer therapy for now   - Continue follow up with neuro/psych; defer medication mgmt to them  - Now with home health to assist with management of some issues      Anemia in neoplastic disease  - Secondary to chemotherapy/immunotherapy and JESSE  - Anemia worsened with HGB 9.1 today but stable  - Completed venofer course dec 2023; jan 2024 iron studies normal  - Monitor iron studie q 4  months for repeat iron needs; ferritin normal today  - Check iron studies in 3-4 months (approximately September 2024)      Chemotherapy-induced peripheral neuropathy  - Still bothersome but stable  - Continue Gabapentin         Leukopenia due to antineoplastic chemotherapy  - Secondary to continued immunotherapy/chemotherapy  - Stable to continue treatment      Immunodeficiency due to  drugs  - Secondary to continued immunotherapy/chemotherapy  - Continue Acyclovir 400 mg twice/day       Bradycardia  - Asymptomatic today      Insomnia  - Trouble maintaining sleep, per   - On Trazodone 50 mg nightly  - Follow up with Dr. Arevalo     BMT Route Chart for Scheduling   Follow Up:   -cbc, cmp, serum free light chains, quantitative immunoglobulins, serum electropheresis, serum immunofixation lab and kenia injection in 4 weeks  -same labs md/np appt and kenia injection in 8 weeks

## 2024-09-30 NOTE — Clinical Note
-cbc, cmp, serum free light chains, quantitative immunoglobulins, serum electropheresis, serum immunofixation lab and kenia injection in 4 weeks -same labs md/np appt and kenia injection in 8 weeks

## 2024-10-01 ENCOUNTER — TELEPHONE (OUTPATIENT)
Dept: HEMATOLOGY/ONCOLOGY | Facility: CLINIC | Age: 74
End: 2024-10-01
Payer: MEDICARE

## 2024-10-01 LAB
ALBUMIN SERPL ELPH-MCNC: 3.23 G/DL (ref 3.35–5.55)
ALPHA1 GLOB SERPL ELPH-MCNC: 0.56 G/DL (ref 0.17–0.41)
ALPHA2 GLOB SERPL ELPH-MCNC: 1.12 G/DL (ref 0.43–0.99)
B-GLOBULIN SERPL ELPH-MCNC: 0.81 G/DL (ref 0.5–1.1)
GAMMA GLOB SERPL ELPH-MCNC: 0.87 G/DL (ref 0.67–1.58)
KAPPA LC SER QL IA: 10.76 MG/DL (ref 0.33–1.94)
KAPPA LC/LAMBDA SER IA: 6.04 (ref 0.26–1.65)
LAMBDA LC SER QL IA: 1.78 MG/DL (ref 0.57–2.63)
PATHOLOGIST INTERPRETATION SPE: NORMAL
PROT SERPL-MCNC: 6.6 G/DL (ref 6–8.4)

## 2024-10-01 NOTE — TELEPHONE ENCOUNTER
----- Message from Laurence sent at 10/1/2024  3:09 PM CDT -----  Regarding: Consult/Advisory  Contact: Fax # [phone]  Consult/Advisory     Name Of Caller: Meliza with [employer]         Contact Preference: Fax # [phone] Phone # [phone]     Nature of call: Meliza following up on home infusion request that was faxed over on last week

## 2024-10-01 NOTE — TELEPHONE ENCOUNTER
Spoke to [employer] and let them know we do not do home infusions. [employer] asking for form to be faxed back stating that. [employer] refaxing form to office.

## 2024-10-03 ENCOUNTER — TELEPHONE (OUTPATIENT)
Dept: HEMATOLOGY/ONCOLOGY | Facility: CLINIC | Age: 74
End: 2024-10-03
Payer: MEDICARE

## 2024-10-03 NOTE — TELEPHONE ENCOUNTER
Spoke with [employer] and advised that we had not received the form. I advised that we do not do home infusions and provided the clinic fax number.

## 2024-10-03 NOTE — TELEPHONE ENCOUNTER
----- Message from Tamia sent at 10/3/2024  1:45 PM CDT -----  Regarding: Consult/Advisory  Contact: Tiffanie @ [phone]  Consult/Advisory     Name Of Caller: Tiffanie ( Katiea)     Contact Preference: [phone]     Nature of call: Tiffanie is calling to get follow up on home infusion request form. Need to be filled out , signed and fax back. Asking for a call

## 2024-10-06 DIAGNOSIS — T50.905A DRUG-INDUCED CYTOPENIA: ICD-10-CM

## 2024-10-06 DIAGNOSIS — D75.9 DRUG-INDUCED CYTOPENIA: ICD-10-CM

## 2024-10-06 DIAGNOSIS — C90.00 MULTIPLE MYELOMA, REMISSION STATUS UNSPECIFIED: ICD-10-CM

## 2024-10-18 ENCOUNTER — TELEPHONE (OUTPATIENT)
Dept: HEMATOLOGY/ONCOLOGY | Facility: CLINIC | Age: 74
End: 2024-10-18
Payer: MEDICARE

## 2024-10-18 NOTE — TELEPHONE ENCOUNTER
"----- Message from Brooke sent at 10/18/2024  8:14 AM CDT -----  Regarding: Consult/Advisory          Name Of Caller:   Jackelyn Batista w/ [employer]     Contact Preference?:   [phone]     Provider Name:   Daniellyn     Does patient feel the need to be seen today? No     What is the nature of the call?:  Requesting to speak with nurse about the reason why the pt can't receive home injections. He also has questions about the form that was sent to the office.        Additional Notes:  "Thank you for all that you do for our patients  "

## 2024-10-18 NOTE — TELEPHONE ENCOUNTER
Returned call and explained that home infusions are a service not provided by Dr. Tineo and therefor, not an option for the patient.

## 2024-10-29 ENCOUNTER — INFUSION (OUTPATIENT)
Dept: INFUSION THERAPY | Facility: HOSPITAL | Age: 74
End: 2024-10-29
Payer: MEDICARE

## 2024-10-29 ENCOUNTER — LAB VISIT (OUTPATIENT)
Dept: LAB | Facility: HOSPITAL | Age: 74
End: 2024-10-29
Attending: INTERNAL MEDICINE
Payer: MEDICARE

## 2024-10-29 VITALS
TEMPERATURE: 98 F | WEIGHT: 125.88 LBS | BODY MASS INDEX: 23.17 KG/M2 | HEIGHT: 62 IN | HEART RATE: 57 BPM | OXYGEN SATURATION: 98 % | RESPIRATION RATE: 18 BRPM | SYSTOLIC BLOOD PRESSURE: 118 MMHG | DIASTOLIC BLOOD PRESSURE: 61 MMHG

## 2024-10-29 DIAGNOSIS — Z94.84 HISTORY OF AUTO STEM CELL TRANSPLANT: ICD-10-CM

## 2024-10-29 DIAGNOSIS — C90.02 MULTIPLE MYELOMA IN RELAPSE: Primary | ICD-10-CM

## 2024-10-29 DIAGNOSIS — C90.00 MULTIPLE MYELOMA, REMISSION STATUS UNSPECIFIED: ICD-10-CM

## 2024-10-29 LAB
ALBUMIN SERPL BCP-MCNC: 3.5 G/DL (ref 3.5–5.2)
ALP SERPL-CCNC: 58 U/L (ref 40–150)
ALT SERPL W/O P-5'-P-CCNC: 7 U/L (ref 10–44)
ANION GAP SERPL CALC-SCNC: 10 MMOL/L (ref 8–16)
AST SERPL-CCNC: 10 U/L (ref 10–40)
BASOPHILS # BLD AUTO: 0.04 K/UL (ref 0–0.2)
BASOPHILS NFR BLD: 1.2 % (ref 0–1.9)
BILIRUB SERPL-MCNC: 0.4 MG/DL (ref 0.1–1)
BUN SERPL-MCNC: 13 MG/DL (ref 8–23)
CALCIUM SERPL-MCNC: 9.4 MG/DL (ref 8.7–10.5)
CHLORIDE SERPL-SCNC: 109 MMOL/L (ref 95–110)
CO2 SERPL-SCNC: 26 MMOL/L (ref 23–29)
CREAT SERPL-MCNC: 0.8 MG/DL (ref 0.5–1.4)
DIFFERENTIAL METHOD BLD: ABNORMAL
EOSINOPHIL # BLD AUTO: 0.2 K/UL (ref 0–0.5)
EOSINOPHIL NFR BLD: 6.1 % (ref 0–8)
ERYTHROCYTE [DISTWIDTH] IN BLOOD BY AUTOMATED COUNT: 19.5 % (ref 11.5–14.5)
EST. GFR  (NO RACE VARIABLE): >60 ML/MIN/1.73 M^2
GLUCOSE SERPL-MCNC: 130 MG/DL (ref 70–110)
HCT VFR BLD AUTO: 32.3 % (ref 37–48.5)
HGB BLD-MCNC: 9.5 G/DL (ref 12–16)
IGA SERPL-MCNC: 120 MG/DL (ref 40–350)
IGG SERPL-MCNC: 1154 MG/DL (ref 650–1600)
IGM SERPL-MCNC: 38 MG/DL (ref 50–300)
IMM GRANULOCYTES # BLD AUTO: 0.01 K/UL (ref 0–0.04)
IMM GRANULOCYTES NFR BLD AUTO: 0.3 % (ref 0–0.5)
LYMPHOCYTES # BLD AUTO: 0.8 K/UL (ref 1–4.8)
LYMPHOCYTES NFR BLD: 23.9 % (ref 18–48)
MCH RBC QN AUTO: 24.3 PG (ref 27–31)
MCHC RBC AUTO-ENTMCNC: 29.4 G/DL (ref 32–36)
MCV RBC AUTO: 83 FL (ref 82–98)
MONOCYTES # BLD AUTO: 0.3 K/UL (ref 0.3–1)
MONOCYTES NFR BLD: 9.8 % (ref 4–15)
NEUTROPHILS # BLD AUTO: 1.9 K/UL (ref 1.8–7.7)
NEUTROPHILS NFR BLD: 58.7 % (ref 38–73)
NRBC BLD-RTO: 0 /100 WBC
PLATELET # BLD AUTO: 214 K/UL (ref 150–450)
PMV BLD AUTO: 11 FL (ref 9.2–12.9)
POTASSIUM SERPL-SCNC: 3.4 MMOL/L (ref 3.5–5.1)
PROT SERPL-MCNC: 6.9 G/DL (ref 6–8.4)
RBC # BLD AUTO: 3.91 M/UL (ref 4–5.4)
SODIUM SERPL-SCNC: 145 MMOL/L (ref 136–145)
WBC # BLD AUTO: 3.26 K/UL (ref 3.9–12.7)

## 2024-10-29 PROCEDURE — 36415 COLL VENOUS BLD VENIPUNCTURE: CPT | Mod: HCNC | Performed by: INTERNAL MEDICINE

## 2024-10-29 PROCEDURE — 96401 CHEMO ANTI-NEOPL SQ/IM: CPT | Mod: HCNC

## 2024-10-29 PROCEDURE — 85025 COMPLETE CBC W/AUTO DIFF WBC: CPT | Mod: HCNC | Performed by: INTERNAL MEDICINE

## 2024-10-29 PROCEDURE — 82784 ASSAY IGA/IGD/IGG/IGM EACH: CPT | Mod: 59,HCNC | Performed by: INTERNAL MEDICINE

## 2024-10-29 PROCEDURE — 80053 COMPREHEN METABOLIC PANEL: CPT | Mod: HCNC | Performed by: INTERNAL MEDICINE

## 2024-10-29 PROCEDURE — 84165 PROTEIN E-PHORESIS SERUM: CPT | Mod: 26,HCNC,, | Performed by: PATHOLOGY

## 2024-10-29 PROCEDURE — 63600175 PHARM REV CODE 636 W HCPCS: Mod: JZ,JG,HCNC | Performed by: INTERNAL MEDICINE

## 2024-10-29 PROCEDURE — 83521 IG LIGHT CHAINS FREE EACH: CPT | Mod: 59,HCNC | Performed by: INTERNAL MEDICINE

## 2024-10-29 PROCEDURE — 84165 PROTEIN E-PHORESIS SERUM: CPT | Mod: HCNC | Performed by: INTERNAL MEDICINE

## 2024-10-29 RX ORDER — DIPHENHYDRAMINE HYDROCHLORIDE 50 MG/ML
50 INJECTION INTRAMUSCULAR; INTRAVENOUS ONCE AS NEEDED
Status: CANCELLED | OUTPATIENT
Start: 2024-10-29

## 2024-10-29 RX ORDER — EPINEPHRINE 0.3 MG/.3ML
0.3 INJECTION SUBCUTANEOUS ONCE AS NEEDED
Status: CANCELLED | OUTPATIENT
Start: 2024-10-29

## 2024-10-29 RX ORDER — DIPHENHYDRAMINE HYDROCHLORIDE 50 MG/ML
50 INJECTION INTRAMUSCULAR; INTRAVENOUS ONCE AS NEEDED
Status: DISCONTINUED | OUTPATIENT
Start: 2024-10-29 | End: 2024-10-29 | Stop reason: HOSPADM

## 2024-10-29 RX ORDER — EPINEPHRINE 0.3 MG/.3ML
0.3 INJECTION SUBCUTANEOUS ONCE AS NEEDED
Status: DISCONTINUED | OUTPATIENT
Start: 2024-10-29 | End: 2024-10-29 | Stop reason: HOSPADM

## 2024-10-29 RX ADMIN — DARATUMUMAB AND HYALURONIDASE-FIHJ (HUMAN RECOMBINANT) 1800 MG: 1800; 30000 INJECTION SUBCUTANEOUS at 02:10

## 2024-10-30 LAB
ALBUMIN SERPL ELPH-MCNC: 3.57 G/DL (ref 3.35–5.55)
ALPHA1 GLOB SERPL ELPH-MCNC: 0.32 G/DL (ref 0.17–0.41)
ALPHA2 GLOB SERPL ELPH-MCNC: 0.76 G/DL (ref 0.43–0.99)
B-GLOBULIN SERPL ELPH-MCNC: 0.67 G/DL (ref 0.5–1.1)
GAMMA GLOB SERPL ELPH-MCNC: 0.98 G/DL (ref 0.67–1.58)
KAPPA LC SER QL IA: 9.42 MG/DL (ref 0.33–1.94)
KAPPA LC/LAMBDA SER IA: 5.2 (ref 0.26–1.65)
LAMBDA LC SER QL IA: 1.81 MG/DL (ref 0.57–2.63)
PATHOLOGIST INTERPRETATION SPE: NORMAL
PROT SERPL-MCNC: 6.3 G/DL (ref 6–8.4)

## 2024-11-07 DIAGNOSIS — T50.905A DRUG-INDUCED CYTOPENIA: ICD-10-CM

## 2024-11-07 DIAGNOSIS — D75.9 DRUG-INDUCED CYTOPENIA: ICD-10-CM

## 2024-11-07 DIAGNOSIS — C90.00 MULTIPLE MYELOMA, REMISSION STATUS UNSPECIFIED: ICD-10-CM

## 2024-11-07 NOTE — TELEPHONE ENCOUNTER
----- Message from Laurence sent at 11/7/2024 11:02 AM CST -----  Regarding: Refill request  Contact: [phone]  Is this a Refill or New Rx: REFILL      Rx Name and Strength:     pomalidomide (POMALYST) 2 mg Cap     Preferred Pharmacy with phone number:      [facility] - [place], [place] - [address]  [address] [address]  Phone: [phone] Fax: [phone]      Communication Preference:  [phone]        Additional Information:  Last script was 1 month ago, 28 day supply she will be out

## 2024-11-11 RX ORDER — METFORMIN HYDROCHLORIDE 1000 MG/1
1000 TABLET ORAL 2 TIMES DAILY WITH MEALS
Qty: 180 TABLET | Refills: 1 | Status: SHIPPED | OUTPATIENT
Start: 2024-11-11

## 2024-11-11 NOTE — TELEPHONE ENCOUNTER
Care Due:                  Date            Visit Type   Department     Provider  --------------------------------------------------------------------------------                                EP -                              PRIMARY      [facility] INTERNAL  Last Visit: 08-      CARE ([facility])   ASHLI Arevalo                              [facility]                              PRIMARY      [facility] INTERNAL  Next Visit: 12-      CARE ([facility])   MEDICINE       Emanuel Arevalo                                                            Last  Test          Frequency    Reason                     Performed    Due Date  --------------------------------------------------------------------------------    HBA1C.......  6 months...  metFORMIN................  04-   10-    [facility] Embedded Care Due Messages. Reference number: [id number].   11/11/2024 12:03:18 PM CST

## 2024-11-13 ENCOUNTER — TELEPHONE (OUTPATIENT)
Dept: INTERNAL MEDICINE | Facility: CLINIC | Age: 74
End: 2024-11-13
Payer: MEDICARE

## 2024-11-14 RX ORDER — DONEPEZIL HYDROCHLORIDE 5 MG/1
5 TABLET, FILM COATED ORAL NIGHTLY
Qty: 90 TABLET | Refills: 3 | Status: SHIPPED | OUTPATIENT
Start: 2024-11-14

## 2025-03-06 NOTE — PROGRESS NOTES
Lab Results   Component Value Date    EGFR 46 03/05/2025    EGFR 40 (L) 02/04/2025    EGFR 40 (L) 02/03/2025    CREATININE 1.10 03/05/2025    CREATININE 1.32 (H) 02/04/2025    CREATININE 1.32 (H) 02/03/2025   Renal function at baseline, monitor with BMP.  Avoid/limit nephrotoxins and hypotension   PATIENT: Shelby Thompson  MRN: [id number]  DATE: 2/10/2020    Chief Complaint: Multiple Myeloma      Oncologic History:     Multiple Myeloma- presented w CRAB criteria (Hgb 7.1, hypercalcemia, renal function - Cr of 2.7, T7 vertebral fracture)  IgG Kappa, RISS Stage III (beta-2 micro globulin of 17.5 and 14:20 translocation) High Risk disease  She has achieved and tolerated well VRd x completing 7, 28 day cycles and achieving deep VGPR'  last dose velcade 10/31/19.    Extensive PMH as below.    2 prior CVAs (one in 2008, one in 2011)  L breast cancer DCIS stage 0 (s/p lumpectomy and radiation, no endocrine tx due to CVA)  HTN  DM II  Neuropathy, b/l hands  Prior smoker, quit in 2011  Hepatic lesions - vascular per recent MRI in 09 /2019 with no changes; will confirm no further rascon needed from hep  Statin Intolerance - on praluent, PCSK9 inhibitor   HFpEF - EF 55%, diastolic dysfunction  Anxiety/Depression     Today: Ms. Thompson is a 69 y.o. female patient of Dr. Tineo's who presents today for planned Verenice 140 Auto SCT. She has received cardiac, pulm, GI, and gyne clearance for transplant. Myeloma in CR as of dec 2019 marrow biopsy and biochemical studies. She is feeling well overall today. Slightly anxious. Denies fevers, chills, chest pain, sob, n/v/d/c. Continues with chronic neuropathy.       Past Medical History:   Past Medical History:   Diagnosis Date    Acute respiratory failure with hypoxia 4/30/2019    FUENTES (acute kidney injury) 5/1/2019    Allergy     Anemia     Aortic aneurysm     Breast cancer 10/2011    left breast Stage 0 DCIS    Chronic diastolic congestive heart failure 11/6/2015    Colon polyp     GERD (gastroesophageal reflux disease)     Hiatal hernia     History of colonic polyps     Hx of psychiatric care     Zoloft    HX: breast cancer     Hyperlipemia     Hypertension     ICH (intracerebral hemorrhage)     Major depressive disorder, single episode, mild 6/23/2016    Nuclear  sclerosis 7/21/2014    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 7/21/2014    PAD (peripheral artery disease) 11/6/2015    Psychiatric problem     Statin-induced rhabdomyolysis     4/2015     Stroke 4/2011    Walker as ambulation aid        Past Surgical HIstory:   Past Surgical History:   Procedure Laterality Date    APPENDECTOMY      BONE MARROW BIOPSY Left 10/3/2019    Procedure: Biopsy-bone marrow;  Surgeon: Jere Tineo MD;  Location: [facility] OR [address] [place] [address];  Service: Oncology;  Laterality: Left;    BREAST BIOPSY Left 10/2011    BREAST LUMPECTOMY Left 2011    DCIS    COLONOSCOPY      COLONOSCOPY N/A 1/9/2020    Procedure: COLONOSCOPY;  Surgeon: Quang De La Cruz MD;  Location: [facility] (4TH FLR);  Service: Endoscopy;  Laterality: N/A;    CYST REMOVAL      back    ESOPHAGOGASTRODUODENOSCOPY  07/2016    duodenal angioectasia    ESOPHAGOGASTRODUODENOSCOPY N/A 1/9/2020    Procedure: EGD (ESOPHAGOGASTRODUODENOSCOPY);  Surgeon: Quang De La Cruz MD;  [facility]  Service: Endoscopy;  Laterality: N/A;    FOOT FRACTURE SURGERY  10/2012    right foot, with R hallux valgus repair    FRACTURE SURGERY Right     broken toe repiar    HEMORRHOID SURGERY      LIVER BIOPSY  04/2015    essentially normal, no fibrosis    TUBAL LIGATION      UPPER GASTROINTESTINAL ENDOSCOPY         Family History:   Family History   Problem Relation Age of Onset    No Known Problems Sister     Cancer Sister 63        Lung Cancer    No Known Problems Mother     Cancer Father     No Known Problems Brother     Hypertension Daughter     Fibroids Daughter         uterine    Hypertension Son     Breast cancer Sister 62    No Known Problems Brother     No Known Problems Maternal Aunt     No Known Problems Maternal Uncle     No Known Problems Paternal Aunt     No Known Problems Paternal Uncle     Hypertension Maternal Grandmother     No Known Problems Maternal Grandfather      No Known Problems Paternal Grandmother     No Known Problems Paternal Grandfather     Ovarian cancer Neg Hx     Colon cancer Neg Hx     Tremor Neg Hx     Amblyopia Neg Hx     Blindness Neg Hx     Cataracts Neg Hx     Diabetes Neg Hx     Glaucoma Neg Hx     Macular degeneration Neg Hx     Retinal detachment Neg Hx     Strabismus Neg Hx     Stroke Neg Hx     Thyroid disease Neg Hx     Esophageal cancer Neg Hx     Rectal cancer Neg Hx     Stomach cancer Neg Hx     Ulcerative colitis Neg Hx     Crohn's disease Neg Hx     Irritable bowel syndrome Neg Hx     Celiac disease Neg Hx        Social History:  reports that she quit smoking about 8 years ago. Her smoking use included cigarettes. She has a 10.00 pack-year smoking history. She has never used smokeless tobacco. She reports that she drank alcohol. She reports that she does not use drugs.    Allergies:  Review of patient's allergies indicates:   Allergen Reactions    Lipitor [atorvastatin]      Itching, elevated cpk, muscle aches, statin induced myositis       Medications:  Current Outpatient Medications   Medication Sig Dispense Refill    acyclovir (ZOVIRAX) 400 MG tablet Take 1 tablet (400 mg total) by mouth 2 (two) times daily. 120 tablet 2    alirocumab (PRALUENT PEN) 75 mg/mL PnIj Inject 1 mL (75 mg total) into the skin every 14 (fourteen) days. 6 Syringe 3    amLODIPine (NORVASC) 5 MG tablet TAKE 1 TABLET BY MOUTH EVERY DAY 90 tablet 3    bisacodyl (DULCOLAX) 5 mg EC tablet Take 5 mg by mouth once daily.      ferrous gluconate 324 mg (37.5 mg iron) Tab TAKE 1 TABLET BY MOUTH 2 (TWO) TIMES DAILY WITH MEALS. 180 tablet 0    gabapentin (NEURONTIN) 300 MG capsule TAKE 1 CAPSULE (300 MG TOTAL) BY MOUTH 2 (TWO) TIMES DAILY. (Patient taking differently: Take 300 mg by mouth 2 (two) times daily. ) 180 capsule 3    loratadine (CLARITIN) 10 mg tablet Take 10 mg by mouth nightly.      omeprazole (PRILOSEC) 40 MG capsule TAKE 1 CAPSULE BY  "MOUTH EVERY DAY 90 capsule 3    ondansetron (ZOFRAN) 4 MG tablet Take 1 tablet (4 mg total) by mouth every 6 (six) hours as needed for Nausea. 30 tablet 1    ondansetron (ZOFRAN-ODT) 8 MG TbDL Take 1 tablet (8 mg total) by mouth every 8 (eight) hours as needed (nausea/vomiting). 60 tablet 1    sertraline (ZOLOFT) 50 MG tablet TAKE 1 TABLET (50 MG TOTAL) BY MOUTH ONCE DAILY. 90 tablet 3    traMADol (ULTRAM) 50 mg tablet Take 1 tablet (50 mg total) by mouth 2 (two) times daily as needed for Pain. 60 tablet 3    vitamin D 1000 units Tab Take 185 mg by mouth once daily.       Current Facility-Administered Medications   Medication Dose Route Frequency Provider Last Rate Last Dose    zoledronic acid (ZOMETA) 4 mg in sodium chloride 0.9% 100 mL IVPB  4 mg Intravenous 1 time in Clinic/HOD Genny Napoles MD         Facility-Administered Medications Ordered in Other Visits   Medication Dose Route Frequency Provider Last Rate Last Dose    [START ON 2/12/2020] 0.9%  NaCl infusion (for blood administration)   Intravenous Q24H PRN Kathleen Kimball NP           Review of Systems   Constitutional: Negative for fatigue and unexpected weight change.   Respiratory: Negative for cough.    Cardiovascular: Negative for chest pain and palpitations.   Gastrointestinal: Negative for abdominal pain.   Genitourinary: Negative for difficulty urinating and dysuria.   Musculoskeletal: Positive for back pain and gait problem.   Skin: Negative for rash.   Neurological: Positive for tremors, weakness and numbness. Negative for headaches.   Hematological: Negative for adenopathy.       ECOG Performance Status: 1   Objective:      Vitals:   Vitals:    02/10/20 1612   BP: 131/76   Pulse: 70   Resp: 16   Temp: 98.2 °F (36.8 °C)   TempSrc: Oral   SpO2: 95%   Weight: 74.8 kg (164 lb 14.5 oz)   Height: 5' 3" (1.6 m)       Physical Exam   Constitutional: She is oriented to person, place, and time. She appears well-developed and well-nourished. " No distress.   HENT:   Head: Normocephalic and atraumatic.   Mouth/Throat: Oropharynx is clear and moist.   Eyes: Pupils are equal, round, and reactive to light. Conjunctivae and EOM are normal.   Neck: Normal range of motion. Neck supple.   Cardiovascular: Normal rate, regular rhythm and normal heart sounds. Exam reveals no gallop and no friction rub.   No murmur heard.  Pulmonary/Chest: Effort normal and breath sounds normal. No respiratory distress. She has no wheezes. She has no rales.   Abdominal: Soft. Bowel sounds are normal. She exhibits no distension. There is no tenderness. There is no guarding.   Musculoskeletal: Normal range of motion. She exhibits no edema.   Lymphadenopathy:     She has no cervical adenopathy.   Neurological: She is alert and oriented to person, place, and time. No cranial nerve deficit.   Intentional R hand tremor; RUE - 4/5 strength and RLE extremity also 4/5; full strengths on LUE and LLE   Skin: Skin is warm and dry. No rash noted.       Laboratory Data:  Lab Results   Component Value Date    WBC 2.84 (L) 10/15/2019    HGB 9.7 (L) 10/15/2019    HCT 34.5 (L) 10/15/2019    MCV 76 (L) 10/15/2019     10/15/2019       Gran # (ANC)   Date Value Ref Range Status   02/10/2020 1.0 (L) 1.8 - 7.7 K/uL Final     Gran%   Date Value Ref Range Status   02/10/2020 50.7 38.0 - 73.0 % Final     CMP  Sodium   Date Value Ref Range Status   02/10/2020 143 136 - 145 mmol/L Final     Potassium   Date Value Ref Range Status   02/10/2020 3.6 3.5 - 5.1 mmol/L Final     Chloride   Date Value Ref Range Status   02/10/2020 104 95 - 110 mmol/L Final     CO2   Date Value Ref Range Status   02/10/2020 31 (H) 23 - 29 mmol/L Final     Glucose   Date Value Ref Range Status   02/10/2020 92 70 - 110 mg/dL Final     BUN, Bld   Date Value Ref Range Status   02/10/2020 16 8 - 23 mg/dL Final     Creatinine   Date Value Ref Range Status   02/10/2020 1.0 0.5 - 1.4 mg/dL Final     Calcium   Date Value Ref Range Status    02/10/2020 9.6 8.7 - 10.5 mg/dL Final     Total Protein   Date Value Ref Range Status   02/10/2020 7.5 6.0 - 8.4 g/dL Final     Albumin   Date Value Ref Range Status   02/10/2020 3.9 3.5 - 5.2 g/dL Final     Total Bilirubin   Date Value Ref Range Status   02/10/2020 0.3 0.1 - 1.0 mg/dL Final     Comment:     For infants and newborns, interpretation of results should be based  on gestational age, weight and in agreement with clinical  observations.  Premature Infant recommended reference ranges:  Up to 24 hours.............<8.0 mg/dL  Up to 48 hours............<12.0 mg/dL  3-5 days..................<15.0 mg/dL  6-29 days.................<15.0 mg/dL       Alkaline Phosphatase   Date Value Ref Range Status   02/10/2020 114 55 - 135 U/L Final     AST   Date Value Ref Range Status   02/10/2020 17 10 - 40 U/L Final     ALT   Date Value Ref Range Status   02/10/2020 12 10 - 44 U/L Final     Anion Gap   Date Value Ref Range Status   02/10/2020 8 8 - 16 mmol/L Final     eGFR if    Date Value Ref Range Status   02/10/2020 >60.0 >60 mL/min/1.73 m^2 Final     eGFR if non    Date Value Ref Range Status   02/10/2020 57.2 (A) >60 mL/min/1.73 m^2 Final     Comment:     Calculation used to obtain the estimated glomerular filtration  rate (eGFR) is the CKD-EPI equation.        [place] Free Light Chains   Date Value Ref Range Status   01/02/2020 1.43 0.33 - 1.94 mg/dL Final   11/25/2019 1.71 0.33 - 1.94 mg/dL Final   10/15/2019 1.76 0.33 - 1.94 mg/dL Final     Lambda Free Light Chains   Date Value Ref Range Status   01/02/2020 1.00 0.57 - 2.63 mg/dL Final   11/25/2019 1.08 0.57 - 2.63 mg/dL Final   10/15/2019 0.93 0.57 - 2.63 mg/dL Final     Kappa/Lambda FLC Ratio   Date Value Ref Range Status   01/02/2020 1.43 0.26 - 1.65 Final   11/25/2019 1.58 0.26 - 1.65 Final   10/15/2019 1.89 (H) 0.26 - 1.65 Final     IgG - Serum   Date Value Ref Range Status   01/02/2020 1060 650 - 1600 mg/dL Final     Comment:      IgG Cord Blood Reference Range: 650-1600 mg/dL.     IgA   Date Value Ref Range Status   01/02/2020 83 40 - 350 mg/dL Final     Comment:     IgA Cord Blood Reference Range: <5 mg/dL.     IgM   Date Value Ref Range Status   01/02/2020 25 (L) 50 - 300 mg/dL Final     Comment:     IgM Cord Blood Reference Range: <25 mg/dL.         Assessment:       1. Multiple myeloma in remission    2. Stem cell transplant candidate    3. Pancytopenia    4. Iron deficiency anemia due to chronic blood loss    5. History of CVA with residual deficit    6. Tremor    7. Personal history of malignant neoplasm of breast    8. Essential hypertension    9. Type 2 diabetes mellitus without complication, without long-term current use of insulin    10. Hepatic lesion    11. Statin intolerance    12. Pure hypercholesterolemia    13. Abnormal PFT    14. Interstitial lung disease    15. Anxiety and depression           Plan:      Multiple Myeloma  -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) High Risk disease; complex karyotype by CG; , t(14;20), monosomy 13 on FISH  -She has achieved and tolerated well VRd x completing 7, 28 day cycles and achieving stringent  CR as of dec 2019 marrow/biochemical studies   -adequate response to proceed with autoSCT  -she has PS 1-2 and multiple abnormalities in her pre transplant eval that have been cleared by pulmonology, cardiology, gynecology and are not prohibitive to proceed with transplant  -she has JESSE - sp  one dose of iv feraheme with response  will need reassessment by GI; if colonosocpy normal  proceed with transplant   -no further velcade maintenance pre transplant, last dose was 12/24/19  -plan for reduced dose clifton 140mg/m2 in light of age    Stem cell transplant candidate  -Today is Day -2  -will receive melphalan 140mg/m2 tomorrow  -plan for stem cell transplant on Wednesday 2/12    Regimen as below:  Transplant Day -2: IV fluids  Transplant Day  -1 through Day 0: Antiemetics   Transplant Day -1: Melphalan   Transplant Day 0: Stem Cell Infusion   Transplant Day +7: Growth factor begins     Pancytopenia  -hx of JESSE; received single dose feraheme pretransplant  -colonoscopy okay for transplant  -will monitor daily CBC inpatient  -transfuse for Hgb <7, Plt <10K    Neuropathy  -pt has had numbness/tingling to hands and BUE tremors post CVA, for which she is on gabapentin 600 mg BID  - prn tramadol    Hx of CVA  -one in 2008, one in 2011  -has been cleared by cardiology for transplant    L breast cancer DCIS stage 0  -s/p lumpectomy and radiation, no endocrine tx due to CVA    HTN  -continue norvasc    DM II  -monitor closely while inpatient; not on active medication    Hepatic lesions  -vascular per recent MRI in 09/2019 with no changes; seen by hepatology pre-transplant and signed off; recommend repeat MRI in 6 months    HLD with Statin Intolerance  -on praluent, PCSK9 inhibitor; will hold on admit    Anxiety/Depression  -continue zoloft    ILD  - had abnormal PFTs during transplant evaluation 10/2019; seen by pulm; no significant desaturation on 6MWT; okay per transplant per pulm        Dispo: Admit to inpatient for planned autologous stem cell transplant    Karina Rankin NP  Hematology/Oncology/BMT
